# Patient Record
Sex: MALE | Race: WHITE | NOT HISPANIC OR LATINO | Employment: OTHER | URBAN - METROPOLITAN AREA
[De-identification: names, ages, dates, MRNs, and addresses within clinical notes are randomized per-mention and may not be internally consistent; named-entity substitution may affect disease eponyms.]

---

## 2017-01-05 DIAGNOSIS — D68.61 ANTIPHOSPHOLIPID ANTIBODY SYNDROME (H): ICD-10-CM

## 2017-01-05 DIAGNOSIS — I48.91 ATRIAL FIBRILLATION, UNSPECIFIED TYPE (H): ICD-10-CM

## 2017-01-05 DIAGNOSIS — I48.91 ATRIAL FIBRILLATION, UNSPECIFIED TYPE (H): Primary | ICD-10-CM

## 2017-01-05 DIAGNOSIS — D89.811 GRAFT-VERSUS-HOST DISEASE, CHRONIC (H): Primary | ICD-10-CM

## 2017-01-05 DIAGNOSIS — C81.99 HODGKIN'S DISEASE OF EXTRANODAL OR SOLID ORGAN SITE (H): ICD-10-CM

## 2017-01-05 RX ORDER — WARFARIN SODIUM 5 MG/1
TABLET ORAL
Qty: 60 TABLET | Refills: 5 | Status: SHIPPED | OUTPATIENT
Start: 2017-01-05 | End: 2017-01-05

## 2017-01-06 RX ORDER — WARFARIN SODIUM 5 MG/1
TABLET ORAL
Qty: 60 TABLET | Refills: 5 | Status: SHIPPED | OUTPATIENT
Start: 2017-01-06 | End: 2018-02-13

## 2017-01-10 ENCOUNTER — ANTICOAGULATION THERAPY VISIT (OUTPATIENT)
Dept: NURSING | Facility: CLINIC | Age: 67
End: 2017-01-10
Payer: COMMERCIAL

## 2017-01-10 DIAGNOSIS — Z79.01 LONG-TERM (CURRENT) USE OF ANTICOAGULANTS: ICD-10-CM

## 2017-01-10 DIAGNOSIS — I48.91 ATRIAL FIBRILLATION, UNSPECIFIED TYPE (H): Primary | ICD-10-CM

## 2017-01-10 LAB — INR POINT OF CARE: 2.5 (ref 0.86–1.14)

## 2017-01-10 PROCEDURE — 36416 COLLJ CAPILLARY BLOOD SPEC: CPT

## 2017-01-10 PROCEDURE — 99207 ZZC NO CHARGE NURSE ONLY: CPT

## 2017-01-10 PROCEDURE — 85610 PROTHROMBIN TIME: CPT | Mod: QW

## 2017-01-10 NOTE — PROGRESS NOTES
ANTICOAGULATION FOLLOW-UP CLINIC VISIT    Patient Name:  Haresh Rock  Date:  1/10/2017  Contact Type:  Face to Face    SUBJECTIVE:     Patient Findings     Positives No Problem Findings           OBJECTIVE    INR PROTIME   Date Value Ref Range Status   01/10/2017 2.5* 0.86 - 1.14 Final     FACTOR 2 ASSAY   Date Value Ref Range Status   05/01/2007 58* 60 - 140 % Final     Comment:     (Note)  CALLED TO [facility](INTERV. RADIOLOGY)[id number],        ASSESSMENT / PLAN  INR assessment THER    Recheck INR In: 6 WEEKS    INR Location Clinic      Anticoagulation Summary as of 1/10/2017     INR goal 2.0-3.0   Selected INR 2.5 (1/10/2017)   Maintenance plan 5 mg (5 mg x 1) on Tue, Thu; 2.5 mg (5 mg x 0.5) all other days   Full instructions 5 mg on Tue, Thu; 2.5 mg all other days   Weekly total 22.5 mg   No change documented Huy Muse RN   Plan last modified Belinda Francis RN (4/19/2016)   Next INR check 2/21/2017   Priority INR   Target end date Indefinite    Indications   Long-term (current) use of anticoagulants [Z79.01] [Z79.01]  Atrial fibrillation (H) [I48.91]         Anticoagulation Episode Summary     INR check location     Preferred lab     Send INR reminders to [facility]    Comments       Anticoagulation Care Providers     Provider Role Specialty Phone number    Anya Nowak MD [facility] [phone]            See the Encounter Report to view Anticoagulation Flowsheet and Dosing Calendar (Go to Encounters tab in chart review, and find the Anticoagulation Therapy Visit)    RN reviewed patient's weekly warfarin dose.  Pt INR remains within therapeutic range.  Pt will continue with current dosing and monitoring as planned, based on physician directed care plan.    [facility]     Huy Muse, K[place]AS

## 2017-01-10 NOTE — MR AVS SNAPSHOT
Haresh Rock   1/10/2017 10:30 AM   Anticoagulation Therapy Visit    Description:  66 year old male   Provider:  TY ANTI COAG   Department:  [facility] Nurse           INR as of 1/10/2017     Selected INR 2.5 (1/10/2017)      Anticoagulation Summary as of 1/10/2017     INR goal 2.0-3.0   Selected INR 2.5 (1/10/2017)   Full instructions 5 mg on Tue, Thu; 2.5 mg all other days   Next INR check 2/21/2017    Indications   Long-term (current) use of anticoagulants [Z79.01] [Z79.01]  Atrial fibrillation (H) [I48.91]         Your next Anticoagulation Clinic appointment(s)     Feb 21, 2017 10:30 AM   Anticoagulation Visit with TY ANTI LEONIDES   [facility] ([facility]    [address] [address] [phone]              Contact Numbers     [facility]  Please call [phone] to cancel and/or reschedule your appointment   Please call [phone] with any problems or questions regarding your therapy.        January 2017 Details    Sun Mon Tue Wed Thu Fri Sat     1               2               3               4               5               6               7                 8               9               10      5 mg   See details      11      2.5 mg         12      5 mg         13      2.5 mg         14      2.5 mg           15      2.5 mg         16      2.5 mg         17      5 mg         18      2.5 mg         19      5 mg         20      2.5 mg         21      2.5 mg           22      2.5 mg         23      2.5 mg         24      5 mg         25      2.5 mg         26      5 mg         27      2.5 mg         28      2.5 mg           29      2.5 mg         30      2.5 mg         31      5 mg              Date Details   01/10 This INR check               How to take your warfarin dose     To take:  2.5 mg Take 0.5 of a 5 mg tablet.    To take:  5 mg Take 1 of the 5 mg tablets.           February 2017 Details    Sun Mon Tue Wed Thu Fri Sat        1      2.5 mg         2       5 mg         3      2.5 mg         4      2.5 mg           5      2.5 mg         6      2.5 mg         7      5 mg         8      2.5 mg         9      5 mg         10      2.5 mg         11      2.5 mg           12      2.5 mg         13      2.5 mg         14      5 mg         15      2.5 mg         16      5 mg         17      2.5 mg         18      2.5 mg           19      2.5 mg         20      2.5 mg         21            22               23               24               25                 26               27               28                    Date Details   No additional details    Date of next INR:  2/21/2017         How to take your warfarin dose     To take:  2.5 mg Take 0.5 of a 5 mg tablet.    To take:  5 mg Take 1 of the 5 mg tablets.

## 2017-02-16 ENCOUNTER — TELEPHONE (OUTPATIENT)
Dept: ONCOLOGY | Facility: CLINIC | Age: 67
End: 2017-02-16

## 2017-02-16 NOTE — TELEPHONE ENCOUNTER
[facility] called to report that patient was dispensed the incorrect strength of pantoprazole on 11/11/2016.  He was given 20 mg tablets.  The order was 40 mg once daily.  The pharmacist informed writer that the pt was given 20 mg tablets with the directions to take one tablet daily.  Pt therefore was taking 1/2 of the prescribed dose.  The pharmacist reported to writer that the patient is requesting a new prescription with the 20 mg dose, as he says it is working for him.      Message send to Dr. Miller letting him know of the error and for approval to order the 20 mg dose for the patient.

## 2017-02-17 DIAGNOSIS — C81.99 HODGKIN'S DISEASE OF EXTRANODAL OR SOLID ORGAN SITE (H): ICD-10-CM

## 2017-02-17 DIAGNOSIS — D89.811 GRAFT-VERSUS-HOST DISEASE, CHRONIC (H): ICD-10-CM

## 2017-02-17 RX ORDER — PANTOPRAZOLE SODIUM 20 MG/1
20 TABLET, DELAYED RELEASE ORAL DAILY
Qty: 90 TABLET | Refills: 3 | Status: SHIPPED | OUTPATIENT
Start: 2017-02-17 | End: 2018-02-16

## 2017-02-21 ENCOUNTER — ANTICOAGULATION THERAPY VISIT (OUTPATIENT)
Dept: NURSING | Facility: CLINIC | Age: 67
End: 2017-02-21
Payer: COMMERCIAL

## 2017-02-21 DIAGNOSIS — Z79.01 LONG-TERM (CURRENT) USE OF ANTICOAGULANTS: ICD-10-CM

## 2017-02-21 DIAGNOSIS — I48.91 ATRIAL FIBRILLATION, UNSPECIFIED TYPE (H): ICD-10-CM

## 2017-02-21 LAB — INR POINT OF CARE: 2.2 (ref 0.86–1.14)

## 2017-02-21 PROCEDURE — 36416 COLLJ CAPILLARY BLOOD SPEC: CPT

## 2017-02-21 PROCEDURE — 99207 ZZC NO CHARGE NURSE ONLY: CPT

## 2017-02-21 PROCEDURE — 85610 PROTHROMBIN TIME: CPT | Mod: QW

## 2017-02-21 NOTE — MR AVS SNAPSHOT
Haresh Rock   2/21/2017 10:30 AM   Anticoagulation Therapy Visit    Description:  66 year old male   Provider:  TY ANTI COAG   Department:  [facility] Nurse           INR as of 2/21/2017     Today's INR 2.2      Anticoagulation Summary as of 2/21/2017     INR goal 2.0-3.0   Today's INR 2.2   Full instructions 5 mg on Tue, Thu; 2.5 mg all other days   Next INR check 4/11/2017    Indications   Long-term (current) use of anticoagulants [Z79.01] [Z79.01]  Atrial fibrillation (H) [I48.91]         Your next Anticoagulation Clinic appointment(s)     Apr 11, 2017 10:30 AM CDT   Anticoagulation Visit with TY ANTI COAAYUSH   [facility] ([facility]    [address] [address] [phone]              Contact Numbers     [facility]  Please call [phone] to cancel and/or reschedule your appointment   Please call [phone] with any problems or questions regarding your therapy.        February 2017 Details    Sun Mon Tue Wed Thu Fri Sat        1               2               3               4                 5               6               7               8               9               10               11                 12               13               14               15               16               17               18                 19               20               21      5 mg   See details      22      2.5 mg         23      5 mg         24      2.5 mg         25      2.5 mg           26      2.5 mg         27      2.5 mg         28      5 mg              Date Details   02/21 This INR check               How to take your warfarin dose     To take:  2.5 mg Take 0.5 of a 5 mg tablet.    To take:  5 mg Take 1 of the 5 mg tablets.           March 2017 Details    Sun Mon Tue Wed Thu Fri Sat        1      2.5 mg         2      5 mg         3      2.5 mg         4      2.5 mg           5      2.5 mg         6      2.5 mg         7      5 mg         8      2.5 mg          9      5 mg         10      2.5 mg         11      2.5 mg           12      2.5 mg         13      2.5 mg         14      5 mg         15      2.5 mg         16      5 mg         17      2.5 mg         18      2.5 mg           19      2.5 mg         20      2.5 mg         21      5 mg         22      2.5 mg         23      5 mg         24      2.5 mg         25      2.5 mg           26      2.5 mg         27      2.5 mg         28      5 mg         29      2.5 mg         30      5 mg         31      2.5 mg           Date Details   No additional details            How to take your warfarin dose     To take:  2.5 mg Take 0.5 of a 5 mg tablet.    To take:  5 mg Take 1 of the 5 mg tablets.           April 2017 Details    Sun Mon Tue Wed Thu Fri Sat           1      2.5 mg           2      2.5 mg         3      2.5 mg         4      5 mg         5      2.5 mg         6      5 mg         7      2.5 mg         8      2.5 mg           9      2.5 mg         10      2.5 mg         11            12               13               14               15                 16               17               18               19               20               21               22                 23               24               25               26               27               28               29                 30                      Date Details   No additional details    Date of next INR:  4/11/2017         How to take your warfarin dose     To take:  2.5 mg Take 0.5 of a 5 mg tablet.    To take:  5 mg Take 1 of the 5 mg tablets.

## 2017-02-21 NOTE — PROGRESS NOTES
ANTICOAGULATION FOLLOW-UP CLINIC VISIT    Patient Name:  Haresh Rock  Date:  2/21/2017  Contact Type:  Face to Face    SUBJECTIVE:     Patient Findings     Positives No Problem Findings           OBJECTIVE    INR Protime   Date Value Ref Range Status   02/21/2017 2.2 (A) 0.86 - 1.14 Final     Factor 2 Assay   Date Value Ref Range Status   05/01/2007 58 (L) 60 - 140 % Final     Comment:     (Note)  CALLED TO [facility](INTERV. RADIOLOGY)[id number],        ASSESSMENT / PLAN  INR assessment THER    Recheck INR In: 7 WEEKS    INR Location Clinic      Anticoagulation Summary as of 2/21/2017     INR goal 2.0-3.0   Today's INR 2.2   Maintenance plan 5 mg (5 mg x 1) on Tue, Thu; 2.5 mg (5 mg x 0.5) all other days   Full instructions 5 mg on Tue, Thu; 2.5 mg all other days   Weekly total 22.5 mg   Plan last modified Belinda Francis RN (4/19/2016)   Next INR check 4/11/2017   Priority INR   Target end date Indefinite    Indications   Long-term (current) use of anticoagulants [Z79.01] [Z79.01]  Atrial fibrillation (H) [I48.91]         Anticoagulation Episode Summary     INR check location     Preferred lab     Send INR reminders to [facility]    Comments       Anticoagulation Care Providers     Provider Role Specialty Phone number    Anya Nowak MD [facility] [phone]            See the Encounter Report to view Anticoagulation Flowsheet and Dosing Calendar (Go to Encounters tab in chart review, and find the Anticoagulation Therapy Visit)        Milvia Sierra, RN

## 2017-03-19 ASSESSMENT — ENCOUNTER SYMPTOMS
SMELL DISTURBANCE: 0
EYE PAIN: 0
NECK MASS: 0
SINUS PAIN: 0
HOARSE VOICE: 0
SINUS CONGESTION: 0
SORE THROAT: 0
TASTE DISTURBANCE: 0
DOUBLE VISION: 0
TROUBLE SWALLOWING: 0
EYE WATERING: 0
EYE REDNESS: 1
EYE IRRITATION: 1

## 2017-03-24 ENCOUNTER — OFFICE VISIT (OUTPATIENT)
Dept: ENDOCRINOLOGY | Facility: CLINIC | Age: 67
End: 2017-03-24

## 2017-03-24 VITALS
SYSTOLIC BLOOD PRESSURE: 123 MMHG | HEIGHT: 72 IN | HEART RATE: 73 BPM | BODY MASS INDEX: 28.21 KG/M2 | DIASTOLIC BLOOD PRESSURE: 71 MMHG | WEIGHT: 208.3 LBS

## 2017-03-24 DIAGNOSIS — R59.0 CERVICAL ADENOPATHY: ICD-10-CM

## 2017-03-24 DIAGNOSIS — E04.1 THYROID NODULE: Primary | ICD-10-CM

## 2017-03-24 DIAGNOSIS — Z92.3 HISTORY OF IRRADIATION, PRESENTING HAZARDS TO HEALTH: ICD-10-CM

## 2017-03-24 DIAGNOSIS — E11.9 TYPE 2 DIABETES MELLITUS (H): ICD-10-CM

## 2017-03-24 DIAGNOSIS — Z85.71 HISTORY OF HODGKIN'S LYMPHOMA: ICD-10-CM

## 2017-03-24 LAB — HBA1C MFR BLD: 6.5 % (ref 4.3–6)

## 2017-03-24 ASSESSMENT — PAIN SCALES - GENERAL: PAINLEVEL: NO PAIN (0)

## 2017-03-24 NOTE — PROGRESS NOTES
Endocrinology Consult Note    Attending ASSESSMENT/PLAN:     Goiter, asymmetrical by imaging .  the goiter asymmetry appears stable on CT dating back to 2005.  The thyroid , especially the right lobe, looked smaller today on real time US.    Because of the history of radiation exposures he should continue to have yearly TFTS for life, or sooner prn    subcm thyroid nodules by US - as per # 1. FNAB of 2 nodules last year benign.  No significant change seen on real time US today.      Cervical adenopathy  - cluster of abnormal right level 3 LNs seen on real time US today. I will send him for formal US to follow up and formally document this.  I believe that what I see on US today might be seen on the 5/16 CT as well.  He has history of hodgkin lymphoma.       Addendum: I am going to recommend US guided FNAB of  Right level 3 LN # 5.  Send for cytology and flow.     Addendum 2: cytology suggests LN is benign.  Excisional biopsy would be required for further definitive diagnosis if suspicion is high .    History of  TBI for BMT (2007) is probably lower risk factor for thyroid cancer due to his age at exposure.  However, he also recalls the [employer] store fluoroscope as a child, unclear how much /if he had true exposure there.  This could have been an important risk factor for thyroid malignancy.    Anticoagulated.     Diabetes mellitus type 2 - Last A1c 6.7% 11/16.      Fabi Jimenez MD      Cc/  HISTORY OF PRESENT ILLNESS  Haresh returns for follow up of asymmetrical goiter with subcm  thyroid nodules    Thyroid nodule was noted on the 11/16/15 chest Ct obtained in the routine course of follow up for Hodgkin lymphoma. However, CT dating to 2005 shows the goiter asymmetry as seen in 11/15.  On 4/25/16 he underwent FNAB of right thyroid nodule # 3 (right posterior 1 x 0.8 x 0.8 cm)  and # 4 (0.9 x 0.8 x 0.8, hyperechoic) , both with benign cytology .     He has a history of myeloproliferative syndrome for which he got  allogenic peripheral blood non -myeloablative stem cell transplant in 2007.  Hodgkin lymphoma was diagnosed in 2011, treated with chemotherapy but not XRT.  Because of the above 2 conditions, he has had extensive imaging in the system.      he recalled the shoe store fluoroscope -- unclear age of exposure or how much exposure he might have had.  He received TBI as part of the BMT in 2007.        I have reviewed images on [facility]  12/11/12 PET CT - no abnormal thyroid uptake   11/16/15 CT neck: right lobe is slightly larger than left - suggestion of inferior/posterior nodule which is 1.7 x 1.1 x . This has tissue plane suggesting it could be extrathyroidal. This is also seen on the 5/6/14 and 5/20/11 neck CT- where it appears stable over time. I can also see it on the 7/8/05 chest CT- stable  3/23/16 thyroid US: somewhat hypoechoic thick  gland most noted right inferior without clear nodule to correspond to CT finding. The most concerning defined nodule is:   Right # 3  Posterior vague borders  1.0- x  0.8 X 0.8 . This is within the larger ill defined area of inferior/ posterior thyroid vagueness  5/16/16 neck CT: thyroid appears fairly symmetrical, right slightly larger; right posterior hypodense subcm  Review of images obtained after the appt :  3/29/17 thyroid and neck US:   Right level 3 abnormal cluster of LNs is confirmed by radiologist -     DM 2 - last A1c 6.7% from 11/29/16   he had seen Jesusita Mejia in the past, last saw her 2012.  DM relevant labs:   9/19/16: urine albumin 23  11/29/16: creatinine 1.06, TSH 2.89  Doesn't check BS    REVIEW OF SYSTEMS  Weight stable  Had cataract surgery in December  No neck symtoms  No dysphagia  Cardiac: negative  GI: negative  No pain  Neuropathy in feet      Past Medical History  Past Medical History:   Diagnosis Date     Antiphospholipid antibody syndrome (H) 2005    Ravi's     Atrial fibrillation (H) 4/2011     Cirrhosis (H)      CKD (chronic kidney disease) stage 1, GFR  90 ml/min or greater      CKD (chronic kidney disease) stage 2, GFR 60-89 ml/min      Diabetes mellitus type II     steroid induced     DJD (degenerative joint disease) of knee     SHAWN, worse on right     DVT (deep venous thrombosis) (H)      ED (erectile dysfunction)      Gallbladder polyp 5/2010     Wnbes-Caumco-Hgtc Disease 2007    BMT     Hemochromatosis      Hodgkin's disease NOS     5 cycles of ABVD in 2011     HTN (hypertension)      Hyperlipidaemia LDL goal <100      Myeloproliferative disorder (H)     atypical, [facility]     PE (pulmonary embolism) 11/2004     Polyneuropathy (H)      Primary pulmonary hypertension (H)      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    Past Surgical History:   Procedure Laterality Date     ANESTHESIA CARDIOVERSION  4/21/2011    Procedure:ANESTHESIA CARDIOVERSION; Surgeon:GENERIC ANESTHESIA PROVIDER; Location:[facility] OR     ARTHROSCOPY KNEE RT/LT      LT     COLONOSCOPY  10/16/2012    Procedure: COLONOSCOPY;  Colonoscopy, screening;  Surgeon: Reg Feliciano MD;  Location: [facility] OR     H ABLATION FOCAL AFIB  3/5/2013    PVI     HC BONE MARROW/STEM TRANSPLANT ALLOGENIC  5/8/2007     INSERT PORT VASCULAR ACCESS       JOINT REPLACEMTN, KNEE RT/LT  3/28/12    SHAWN     VASECTOMY  1990       Medications  Current Outpatient Prescriptions   Medication Sig Dispense Refill     pantoprazole (PROTONIX) 20 MG EC tablet Take 1 tablet (20 mg) by mouth daily 90 tablet 3     warfarin (COUMADIN) 5 MG tablet 5 mg on Tue, Thu; 2.5 mg all other days or as directed by coumadin clinic 60 tablet 5     simvastatin (ZOCOR) 10 MG tablet TAKE 1 TABLET (10 MG) BY MOUTH AT BEDTIME 90 tablet 3     fluticasone (FLONASE) 50 MCG/ACT nasal spray SPRAY 1-2 SPRAYS INTO BOTH NOSTRILS DAILY NEEDS TO BE SEEN FOR FURTHER REFILLS 16 mL 11     sotalol (BETAPACE) 80 MG tablet Take 0.5 tablets (40 mg) by mouth 2 times daily Follow up visit needed with Dr Matt 90 tablet 3     folic acid (FOLVITE) 1 MG  tablet Take 1 tablet (1,000 mcg) by mouth daily 90 tablet 3     Pseudoeph-Doxylamine-DM-APAP (NYQUIL PO) Take by mouth as needed       amoxicillin (AMOXIL) 875 MG tablet Take 1 tablet (875 mg) by mouth 2 times daily (Patient taking differently: Take 875 mg by mouth 2 times daily Prior to dental appts) 20 tablet 0     ARTIFICIAL TEAR SOLUTION OP Apply  to eye. USE AS DIRECTED(DF)       CENTRUM OR 1 TABLET DAILY       Allergies  Allergies   Allergen Reactions     No Known Drug Allergy      Family History  family history includes Alzheimer Disease in his father; CEREBROVASCULAR DISEASE in his mother; DIABETES in his paternal aunt; GASTROINTESTINAL DISEASE in his maternal grandmother; Hypertension in his mother; Thyroid Disease in his sister. There is no history of CANCER, C.A.D., or Thyroid Cancer.    Social History  Social History   Substance Use Topics     Smoking status: Never Smoker     Smokeless tobacco: Never Used     Alcohol use No      ; lives in [place]    Physical Exam  /71  Pulse 73  Ht 1.829 m (6')  Wt 94.5 kg (208 lb 4.8 oz)  BMI 28.25 kg/m2  Body mass index is 28.25 kg/(m^2).  GENERAL : middle aged man  In no apparent distress  SKIN: Normal color, normal temperature, texture.       EYES: PER, No scleral icterus,  No proptosis, conjunctival redness, stare, retraction  NECK: No visible masses. No palpable adenopathy, or masses   THYROID:  Not palpable . I have performed real time thyriod and neck US.  On the right lateral neck, approx level 3, there is a subtle cluster of lymph nodes.  The thyroid lobe is small. The left lobe appears larger than right today   RESP: Lungs clear to auscultation bilaterally  CARDIAC: Regular rate and rhythm, normal S1 S2, with 2/6 systolic murmur across precordium; no  rubs or gallops   NEURO: awake, alert, responds appropriately to questions.  Moves all extremities; No tremor of the outstretched hand.  DTRs  0/4 ,   EXTREMITIES: No clubbing, cyanosis or  edema.    DATA REVIEW    Copath Report 04/25/2016  2:54 PM 88      Patient Name: PAMELA GRANADOS   MR#: [id number]   Specimen #: [id number]   Collected: 4/25/2016   Received: 4/25/2016   Reported: 4/26/2016 17:16   Ordering Phy(s): ANGEL GUPTA     SPECIMEN/STAIN PROCESS:   A: FNA-thyroid, right nodule #3        Pap-Cyto x 4, Diff Quick Stain-cyto x 5   B: FNA-thyroid, right nodule #4        Pap-Cyto x 3, Diff Quick Stain-cyto x 3     ----------------------------------------------------------------     CYTOLOGIC INTERPRETATION:     A. Thyroid, right nodule #3, ultrasound guided fine needle aspiration:   - Benign   - Consistent with a benign nodule (includes adenomatoid nodule, colloid   nodule, etc.)   Specimen Adequacy: Satisfactory for evaluation.     The [place] Implied Risk of Malignancy and Recommended Clinical   Management:   Benign has a 0-3% risk of malignancy, recommended management is clinical   follow-up     B. Thyroid, right nodule #4, ultrasound guided fine needle aspiration:   - Benign   - Consistent with a benign nodule (includes adenomatoid nodule, colloid   nodule, etc.)   Specimen Adequacy: Satisfactory for evaluation.     The [place] Implied Risk of Malignancy and Recommended Clinical   Management:   Benign has a 0-3% risk of malignancy, recommended management is clinical   follow-up     I have personally reviewed all specimens and/or slides, including the   listed special stains, and used them with my medical judgment to   determine the final diagnosis.     Electronically signed out by:   Agustin Calvin M.D., [facility]     Processed and screened at [facility],   [facility]     CLINICAL HISTORY:   65 year old male with a history of MDS, s/p non-myeloablative   allogeneic-sibling peripheral blood stem cell transplant, chronic   zdvll-ggyyco-kfls disease, liver cirrhosis, hemochromatosis, and Hodgkin   lymphoma.     ,     GROSS:   YURI  FNA-thyroid, right nodule #3:  Received are 4 fixed slides, processed   for Pap stain, and 5 air dried slides, processed for Diff Quik stain.   Thyroid panel tube held.     B. FNA-thyroid, right nodule #4:  Received are 3 fixed slides, processed   for Pap stain, and 3 air dried slides, processed for Diff Quik stain.   Thyroid panel tube held.     INTRAOPERATIVE CONSULTATION:   FNA Performance: Fine needle aspiration was not performed by [facility],    Pathology staff.     Immediate Adequacy: On site specimen adequacy evaluation was performed   by Dr. CODY Acuña MD via telepathology.     Onsite adequacy/interpretation:   Pass A1: inadequate; Pass A2: put directly into thyroid panel tube;   Passes A3-A6: inadequate   Pass B1: adequate; Pass B2: put directly into thyroid panel tube; Passes   B3-B4: adequate     MICROSCOPIC:   Microscopic examination is performed.   Nikunj Velez MD, Yuridia HAMPTONBS-PGY2     CPT Codes:   A: 88677-JYFN-DZH, 36297-BUGC-WNN, 46135-DNSN   B: 21990-HNXL-UPW, 17317-PVXA-EHR, 05132-EPJM     TESTING LAB LOCATION:   [address]   [address]   [phone]     COLLECTION SITE:   Client:  [facility]   Location:  [facility] ()        EXAM: TEMPORARY 3/23/2016 10:28 AM      COMPARISON: Correlation made with CT of the neck from 11/16/2015     HISTORY: Thyroid nodule on CT.     FINDINGS:     Technique: Grayscale and color ultrasound imaging of the thyroid was  performed.     Findings:   Thyroid parenchyma: Homogenous thyroid with nodules     The right lobe of the thyroid measures: 2.4 x 2.0 x 3.8 cm     The thyroid isthmus measures: 6 mm thick     The left lobe of the thyroid measures: 2.0 x 1.5 x 4.3 cm      Right lobe:  Nodule 1: Superior  Largest diameter: 0.3 cm  Shape: Round  Composition: Predominantly cystic  Echogenicity: Mostly anechoic  Margins: Smooth  Internal  Echogenic Foci: None     Nodule 2: Mid to superior  Largest diameter: 0.5 cm  Shape: Wider-than-tall  Composition: Predominantly cystic  Echogenicity: Mostly anechoic  Margins: Smooth  Internal Echogenic Foci: None     Nodule 3: Inferior/exophytic  Largest diameter: 1.0 cm  Shape: Round  Composition: Solid  Echogenicity: Heterogeneously hypoechoic  Margins: Ill-defined  Internal Echogenic Foci: None     Nodule 4: Inferior medial  Largest diameter: 0.9 cm  Shape: Round  Composition: Predominantly solid  Echogenicity: Heterogeneously isoechoic  Margins: Ill-defined  Internal Echogenic Foci: None         IMPRESSION:   Right thyroid nodules as described above, 2 of which are solid and  measure approximately 1 cm in maximum dimension.      I have personally reviewed the examination and initial interpretation  and I agree with the findings.     MADELYN HARRISON MD         CT SOFT TISSUE NECK W CONTRAST 5/16/2016 11:07 AM     History: Hereditary hemochromatosis, Type 2 diabetes mellitus with  diabetic nephropathy, Presence of artificial knee joint, bilateral,  Hodgkin lymphoma, unspecified, extranodal and solid organ sites, Bone  marrow transplant status, Paroxysmal atrial fibrillation      Comparison: Images from ultrasound-guided thyroid FNA 4/25/2016. Neck  CT 11/16/2015.      Technique: Following intravenous administration of nonionic iodinated  contrast medium, thin section helical CT images were obtained from the  skull base down to the level of the aortic arch. Axial, coronal and  sagittal reformations were performed with 2-3 mm slice thickness  reconstruction. Images were reviewed in soft tissue, lung and bone  windows.     Contrast: Isovue 370 127cc     Findings:   Evaluation of the mucosal space demonstrates no evident abnormality in  the nasopharynx, oropharynx, hypopharynx or the glottis. The tongue  base appears normal. The major salivary glands appear unremarkable.     There is no evident cervical  lymphadenopathy. Few prominent but  nonenlarged subcentimeter left subclavicular lymph nodes again  demonstrated. The fascial spaces in the neck are intact bilaterally.  The major vascular structures in the neck appear unremarkable.  Hypoattenuating subcentimeter right thyroid nodules, unchanged.     Evaluation of the osseous structures demonstrates multilevel  degenerative changes of the cervical spine, most pronounced at C4-5,  C5-6, and C6-7. Moderate associated spinal canal narrowing at C5-6 and  C6-7. Multilevel neuroforaminal stenosis. New moderate bilateral  maxillary sinus mucosal thickening. Patent bilateral maxillary  antrostomies. Also new moderate bilateral ethmoid mucosal thickening  and increased left frontal sinus mucosal thickening. Mild sphenoid  sinus mucosal thickening. Opacification of a few scattered bilateral  mastoid air cells.      Mosaic attenuation of the visualized lung apices which may be related  to the degree of inspiration. Mild scarring in the right lung apex  again demonstrated.         Impression:  1. No evidence of recurrent lymphoma in the neck.  2. New moderate paranasal sinus mucosal thickening.     TIN DUNCAN MD      EXAMINATION: Thyroid ultrasound, 3/29/2017 1:11 PM      COMPARISON: 5/16/2016 soft tissue neck CT; 3/23/2016 thyroid  ultrasound; 4/25/2016 ultrasound-guided thyroid nodule fine-needle  aspiration     HISTORY: Nontoxic single thyroid nodule     Technique: Grayscale and color ultrasound imaging of the thyroid was  performed.     Findings:   Thyroid parenchyma: Homogeneous     The right lobe of the thyroid measures: 2.3 x 1.9 x 4.0 cm      The thyroid isthmus measures: 0.5 cm      The left lobe of the thyroid measures: 1.7 x 2.0 x 3.5 cm      0.5 x 0.2 x 0.3 hypoechoic, nonvascular nodule in the superior right  thyroid lobe, unchanged since 3/23/2016.     0.4 x 0.2 x 0.4 cm hypoechoic, nonvascular nodule in the mid to  superior right thyroid lobe, unchanged  since 3/23/2016.     0.9 x 0.8 x 0.7 cm hypoechoic, solid, nonvascular nodule in the  posterior inferior right thyroid lobe, unchanged since 3/23/2016. This  was previously biopsied on 4/25/2016.     0.7 x 0.6 x 0.7 cm hypoechoic, solid, nonvascular nodule in the medial  mid to inferior right thyroid lobe, unchanged since 3/23/2016. This  was previously biopsied on 4/25/2016.     No left thyroid lobe nodules.         Impression:  No significant change in right thyroid nodules since 3/23/2016, the 2  largest of which were biopsied on 4/25/2016 with benign pathologic  result.     I have personally reviewed the examination and initial interpretation  and I agree with the findings.     HO WORRELL MD    EXAMINATION: US HEAD NECK SOFT TISSUE, 3/29/2017 1:09 PM      COMPARISON: 5/16/2016 soft tissue neck CT     HISTORY: abnormal cluster of LNs right level 3 seen on real time US  today., Nontoxic single thyroid nodule, Localized enlarged lymph nodes     FINDINGS:      Lymph nodes are measured bilaterally with measurements given in  craniocaudal, transverse and AP dimensions as follows:     Right:  Level 1: No lymph nodes.  Level 2: No lymph nodes.  Level 3: Multiple ovoid, hypoechoic lymph nodes at this level, most of  which contain fatty alex, though at least one demonstrates eccentric  cortical thickening. This lymph node measures 0.5 x 0.6 x 0.9 cm and  is labeled #5. The lymph node labeled #1 measuring 0.5 x 0.9 x 0.8 cm  does not demonstrate a normal fatty hilum and may also demonstrate  mild cortical thickening.  Level 4: No lymph nodes.  Level 5: No lymph nodes.  Level 6: No lymph nodes.  Level 7: No lymph nodes.     Left:  Level 1: No lymph nodes.  Level 2: No lymph nodes.  Level 3: No lymph nodes.  Level 4: No lymph nodes.  Level 5: No lymph nodes.  Level 6: No lymph nodes.  Level 7: No lymph nodes.         IMPRESSION:  Cluster of small lymph nodes at level 3 on the right, some of which  demonstrate  indeterminate characteristics but appear to correlate to  clustered lymph nodes seen on 5/16/2016 soft tissue neck CT without  substantial change, accounting for differences in modality.     I have personally reviewed the examination and initial interpretation  and I agree with the findings.     LEONARDO REYES      Patient Name: PAMELA GRANADOS   MR#: [id number]   Specimen #: [id number]   Collected: 4/17/2017   Received: 4/17/2017   Reported: 4/18/2017 16:14   Ordering Phy(s): ANGEL GUPTA     For improved result formatting, select 'View Enhanced Report Format'   under Linked Documents section.     SPECIMEN/STAIN PROCESS:   FNA-lymph node- Right Level 3 #5        Diff Quick Stain-cyto x 2     ----------------------------------------------------------------     CYTOLOGIC INTERPRETATION:      Lymph node, right level 3 #5, ultrasound-guided fine needle aspiration:     Polymorphous lymphoid population (see comment).   Specimen Adequacy: Satisfactory for evaluation.     COMMENT:   Concurrent flow cytometry specimen [id number] reports presence of   polytypic B-cells. Although no cytologic features are noted, if   Hodgkin's lymphoma is strongly suspected, an excisional biopsy with   histologic examination and immunohistochemistry is recommended.     I have personally reviewed all specimens and/or slides, including the   listed special stains, and used them with my medical judgment to   determine the final diagnosis.     Electronically signed out by:   Nikunj Velez M.D., [facility]     Processed and screened at [facility]     CLINICAL HISTORY:   The patient is a 66-year-old man with history of myeloproliferative   syndrome status post allogenic peripheral blood non-myeloablative stem   cell transplant ( 2007) and Hodgkin's lymphoma (2011) treated with   chemotherapy.  Recent neck CT identified abnormal cluster of right level   4 lymph nodes, the largest  measuring 0.9 cm.  Procedure:   Ultrasound-guided fine needle aspiration.     ,     GROSS:   FNA-lymph node- Right Level 3 #5:  Received are 2 air dried slides,   processed for Diff Quik stain. Material in RPMI sent to Flow Cytometry.     INTRAOPERATIVE CONSULTATION:   FNA Performance: Fine needle aspiration was not performed by [facility],    Pathology staff.     Immediate Adequacy: On site specimen adequacy evaluation was performed   by Dr. ULI Velez III, MD via telepathology.     On-site adequacy/interpretation:   Pass A1: Adequate, Pass A2: Put directly into RPMI, Pass A3: Adequate     MICROSCOPIC:   Microscopic examination is performed.     Koki Merchant MD, Ph D          Nikunj Velez III, MD     CPT Codes:    22097-QBSU-NFO, 06115-DOJI-RVJ   A: 91207-YJMY     TESTING LAB LOCATION:   [facility], [address]   [address]   [phone]     COLLECTION SITE:   Client:  [facility]   Location:  [facility] ([facility])

## 2017-03-24 NOTE — PATIENT INSTRUCTIONS
In general, you should Continue to have yearly thyroid function tests for life, or sooner if a concern arises.     To expedite your medication refill(s), please contact your pharmacy and have them fax a refill request to: [phone].  *Please allow 3 business days for routine medication refills.  *Please allow 5 business days for controlled substance medication refills.  --------------------  For scheduling appointments (including lab work), please request an appointment through [facility], or call: [phone].    For questions for your provider or the endocrine nurse, please send a [employer] message.  For after-hours urgent issues, please dial [phone], and ask to speak with the Endocrinologist On-Call.  --------------------  Please Note: If you are active on [employer], all future test results will be sent by [employer] message only and will no longer be sent by mail. You may also receive communication directly from your physician.

## 2017-03-24 NOTE — MR AVS SNAPSHOT
After Visit Summary   3/24/2017    Haresh Rock    MRN: [id number]           Patient Information     Date Of Birth          1950        Visit Information        Provider Department      3/24/2017 1:00 PM Fabi Jimenez MD [facility]        Today's Diagnoses     Thyroid nodule    -  1    Cervical adenopathy          Care Instructions    In general, you should Continue to have yearly thyroid function tests for life, or sooner if a concern arises.     To expedite your medication refill(s), please contact your pharmacy and have them fax a refill request to: [phone].  *Please allow 3 business days for routine medication refills.  *Please allow 5 business days for controlled substance medication refills.  --------------------  For scheduling appointments (including lab work), please request an appointment through [employer], or call: [phone].    For questions for your provider or the endocrine nurse, please send a [employer] message.  For after-hours urgent issues, please dial [phone], and ask to speak with the Endocrinologist On-Call.  --------------------  Please Note: If you are active on [employer], all future test results will be sent by [employer] message only and will no longer be sent by mail. You may also receive communication directly from your physician.          Follow-ups after your visit        Your next 10 appointments already scheduled     Mar 29, 2017 12:15 PM CDT   US HEAD NECK SOFT TISSUE with UCUS1   [facility] ([facility])    [address] [place] [address] [phone]           Please bring a list of your medicines (including vitamins, minerals and over-the-counter drugs). Also, tell your doctor about any allergies you may have. Wear comfortable clothes and leave your valuables at home.  You do not need to do anything special to prepare for your exam.  Please call the Imaging Department at  your exam site with any questions.            Mar 29, 2017  1:00 PM CDT   US THYROID with UCUS1   [facility] ([facility])    [address]  [address] [facility] [id number] [phone]           Please bring a list of your medicines (including vitamins, minerals and over-the-counter drugs). Also, tell your doctor about any allergies you may have. Wear comfortable clothes and leave your valuables at home.  You do not need to do anything special to prepare for your exam.  Please call the Imaging Department at your exam site with any questions.            Apr 11, 2017 10:30 AM CDT   Anticoagulation Visit with FZ ANTI COAG   [facility]    [address] [id number] [phone]            May 30, 2017 10:30 AM CDT   [facility] Draw with  [facility] LAB DRAW   [facility] Draw ([facility])    [address] [id number] [phone]            May 30, 2017 11:00 AM CDT   Return with Augusto Miller MD   [facility] Mar[facility] ([facility])    [address]  [address] [facility] [id number] [phone]              Future tests that were ordered for you today     Open Future Orders        Priority Expected Expires Ordered    US Thyroid Routine  10/20/2017 3/24/2017    US head neck soft tissue Routine 6/22/2017 10/20/2017 3/24/2017            Who to contact     Please call your clinic at [phone] to:    Ask questions about your health    Make or cancel appointments    Discuss your medicines    Learn about your test results    Speak to your doctor   If you have compliments or concerns about an experience at your clinic, or if you wish to file a complaint, please contact [facility] Patient Relations at [phone] or email us at  [email or web address]         Additional Information About Your Visit        [facility] Information     [employer] gives you secure access to your electronic health record. If you see a primary care provider, you can also send messages to your care team and make appointments. If you have questions, please call your primary care clinic.  If you do not have a primary care provider, please call [phone] and they will assist you.      [employer] is an electronic gateway that provides easy, online access to your medical records. With [employer], you can request a clinic appointment, read your test results, renew a prescription or communicate with your care team.     To access your existing account, please contact your [facility] or call [phone] for assistance.        Care EveryWhere ID     This is your Care EveryWhere ID. This could be used by other organizations to access your [facility] medical records  [id number]        Your Vitals Were     Pulse Height BMI (Body Mass Index)             73 1.829 m (6') 28.25 kg/m2          Blood Pressure from Last 3 Encounters:   03/24/17 123/71   12/27/16 124/78   11/29/16 148/87    Weight from Last 3 Encounters:   03/24/17 94.5 kg (208 lb 4.8 oz)   12/27/16 93.9 kg (207 lb)   11/29/16 94.3 kg (207 lb 12.8 oz)                 Today's Medication Changes          These changes are accurate as of: 3/24/17  1:28 PM.  If you have any questions, ask your nurse or doctor.               These medicines have changed or have updated prescriptions.        Dose/Directions    amoxicillin 875 MG tablet   Commonly known as:  AMOXIL   This may have changed:  additional instructions   Used for:  Acute sinusitis with symptoms > 10 days        Dose:  875 mg   Take 1 tablet (875 mg) by mouth 2 times daily   Quantity:  20 tablet   Refills:  0                Primary Care Provider Office Phone # Fax #    Anya Nowak [phone]       [place]  [facility] [address] [address]        Thank you!     Thank you for choosing [facility]  for your care. Our goal is always to provide you with excellent care. Hearing back from our patients is one way we can continue to improve our services. Please take a few minutes to complete the written survey that you may receive in the mail after your visit with us. Thank you!             Your Updated Medication List - Protect others around you: Learn how to safely use, store and throw away your medicines at www.disposemymeds.org.          This list is accurate as of: 3/24/17  1:28 PM.  Always use your most recent med list.                   Brand Name Dispense Instructions for use    amoxicillin 875 MG tablet    AMOXIL    20 tablet    Take 1 tablet (875 mg) by mouth 2 times daily       ARTIFICIAL TEAR SOLUTION OP      Apply  to eye. USE AS DIRECTED(DF)       CENTRUM PO      1 TABLET DAILY       fluticasone 50 MCG/ACT spray    FLONASE    16 mL    SPRAY 1-2 SPRAYS INTO BOTH NOSTRILS DAILY NEEDS TO BE SEEN FOR FURTHER REFILLS       folic acid 1 MG tablet    FOLVITE    90 tablet    Take 1 tablet (1,000 mcg) by mouth daily       NYQUIL PO      Take by mouth as needed       pantoprazole 20 MG EC tablet    PROTONIX    90 tablet    Take 1 tablet (20 mg) by mouth daily       simvastatin 10 MG tablet    ZOCOR    90 tablet    TAKE 1 TABLET (10 MG) BY MOUTH AT BEDTIME       sotalol 80 MG tablet    BETAPACE    90 tablet    Take 0.5 tablets (40 mg) by mouth 2 times daily Follow up visit needed with Dr Matt       warfarin 5 MG tablet    COUMADIN    60 tablet    5 mg on Tue, Thu; 2.5 mg all other days or as directed by coumadin clinic

## 2017-03-24 NOTE — LETTER
3/24/2017     RE: Haresh Rock  [address] [address]     Dear Colleague,    Thank you for referring your patient, Haresh Rock, to the [facility] ENDOCRINOLOGY at [facility]. Please see a copy of my visit note below.    Endocrinology Consult Note    Attending ASSESSMENT/PLAN:     Goiter, asymmetrical by imaging .  the goiter asymmetry appears stable on CT dating back to 2005.  The thyroid , especially the right lobe, looked smaller today on real time US.    Because of the history of radiation exposures he should continue to have yearly TFTS for life, or sooner prn    subcm thyroid nodules by US - as per # 1. FNAB of 2 nodules last year benign.  No significant change seen on real time US today.      Cervical adenopathy  - cluster of abnormal right level 3 LNs seen on real time US today. I will send him for formal US to follow up and formally document this.  I believe that what I see on US today might be seen on the 5/16 CT as well.  He has history of hodgkin lymphoma.       History of  TBI for BMT (2007) is probably lower risk factor for thyroid cancer due to his age at exposure.  However, he also recalls the [employer] store fluoroscope as a child, unclear how much /if he had true exposure there.  This could have been an important risk factor for thyroid malignancy.    Anticoagulated.     Diabetes mellitus type 2 - Last A1c 6.7% 11/16.      Fabi Jimenez MD      Cc/  HISTORY OF PRESENT ILLNESS  Haresh returns for follow up of asymmetrical goiter with subcm  thyroid nodules    Thyroid nodule was noted on the 11/16/15 chest Ct obtained in the routine course of follow up for Hodgkin lymphoma. However, CT dating to 2005 shows the goiter asymmetry as seen in 11/15.  On 4/25/16 he underwent FNAB of right thyroid nodule # 3 (right posterior 1 x 0.8 x 0.8 cm)  and # 4 (0.9 x 0.8 x 0.8, hyperechoic) , both with benign cytology .     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  Hodgkin lymphoma was diagnosed in 2011, treated with chemotherapy but not XRT.  Because of the above 2 conditions, he has had extensive imaging in the system.      he recalled the shoe store fluoroscope -- unclear age of exposure or how much exposure he might have had.  He received TBI as part of the BMT in 2007.        I have reviewed images on [facility]  12/11/12 PET CT - no abnormal thyroid uptake   11/16/15 CT neck: right lobe is slightly larger than left - suggestion of inferior/posterior nodule which is 1.7 x 1.1 x . This has tissue plane suggesting it could be extrathyroidal. This is also seen on the 5/6/14 and 5/20/11 neck CT- where it appears stable over time. I can also see it on the 7/8/05 chest CT- stable  3/23/16 thyroid US: somewhat hypoechoic thick  gland most noted right inferior without clear nodule to correspond to CT finding. The most concerning defined nodule is:   Right # 3  Posterior vague borders  1.0- x  0.8 X 0.8 . This is within the larger ill defined area of inferior/ posterior thyroid vagueness  5/16/16 neck CT: thyroid appears fairly symmetrical, right slightly larger; right posterior hypodense subcm    DM 2 - last A1c 6.7% from 11/29/16   he had seen Jesusita Mejia in the past, last saw her 2012.  DM relevant labs:   9/19/16: urine albumin 23  11/29/16: creatinine 1.06, TSH 2.89  Doesn't check BS    REVIEW OF SYSTEMS  Weight stable  Had cataract surgery in December  No neck symtoms  No dysphagia  Cardiac: negative  GI: negative  No pain  Neuropathy in feet      Past Medical History  Past Medical History:   Diagnosis Date     Antiphospholipid antibody syndrome (H) 2005    Ravi's     Atrial fibrillation (H) 4/2011     Cirrhosis (H)      CKD (chronic kidney disease) stage 1, GFR 90 ml/min or greater      CKD (chronic kidney disease) stage 2, GFR 60-89 ml/min      Diabetes  mellitus type II     steroid induced     DJD (degenerative joint disease) of knee     SHAWN, worse on right     DVT (deep venous thrombosis) (H)      ED (erectile dysfunction)      Gallbladder polyp 5/2010     Wrqtg-Jbgxdv-Pgbm Disease 2007    BMT     Hemochromatosis      Hodgkin's disease NOS     5 cycles of ABVD in 2011     HTN (hypertension)      Hyperlipidaemia LDL goal <100      Myeloproliferative disorder (H)     atypical, [facility]     PE (pulmonary embolism) 11/2004     Polyneuropathy (H)      Primary pulmonary hypertension (H)      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    Past Surgical History:   Procedure Laterality Date     ANESTHESIA CARDIOVERSION  4/21/2011    Procedure:ANESTHESIA CARDIOVERSION; Surgeon:GENERIC ANESTHESIA PROVIDER; Location:[facility] OR     ARTHROSCOPY KNEE RT/LT      LT     COLONOSCOPY  10/16/2012    Procedure: COLONOSCOPY;  Colonoscopy, screening;  Surgeon: Reg Feliciano MD;  Location: [facility] OR     H ABLATION FOCAL AFIB  3/5/2013    PVI     HC BONE MARROW/STEM TRANSPLANT ALLOGENIC  5/8/2007     INSERT PORT VASCULAR ACCESS       JOINT REPLACEMTN, KNEE RT/LT  3/28/12    SHAWN     VASECTOMY  1990       Medications  Current Outpatient Prescriptions   Medication Sig Dispense Refill     pantoprazole (PROTONIX) 20 MG EC tablet Take 1 tablet (20 mg) by mouth daily 90 tablet 3     warfarin (COUMADIN) 5 MG tablet 5 mg on Tue, Thu; 2.5 mg all other days or as directed by coumadin clinic 60 tablet 5     simvastatin (ZOCOR) 10 MG tablet TAKE 1 TABLET (10 MG) BY MOUTH AT BEDTIME 90 tablet 3     fluticasone (FLONASE) 50 MCG/ACT nasal spray SPRAY 1-2 SPRAYS INTO BOTH NOSTRILS DAILY NEEDS TO BE SEEN FOR FURTHER REFILLS 16 mL 11     sotalol (BETAPACE) 80 MG tablet Take 0.5 tablets (40 mg) by mouth 2 times daily Follow up visit needed with Dr Matt 90 tablet 3     folic acid (FOLVITE) 1 MG tablet Take 1 tablet (1,000 mcg) by mouth daily 90 tablet 3     Pseudoeph-Doxylamine-DM-APAP (NYQUIL  PO) Take by mouth as needed       amoxicillin (AMOXIL) 875 MG tablet Take 1 tablet (875 mg) by mouth 2 times daily (Patient taking differently: Take 875 mg by mouth 2 times daily Prior to dental appts) 20 tablet 0     ARTIFICIAL TEAR SOLUTION OP Apply  to eye. USE AS DIRECTED(DF)       CENTRUM OR 1 TABLET DAILY       Allergies  Allergies   Allergen Reactions     No Known Drug Allergy      Family History  family history includes Alzheimer Disease in his father; CEREBROVASCULAR DISEASE in his mother; DIABETES in his paternal aunt; GASTROINTESTINAL DISEASE in his maternal grandmother; Hypertension in his mother; Thyroid Disease in his sister. There is no history of CANCER, C.A.D., or Thyroid Cancer.    Social History  Social History   Substance Use Topics     Smoking status: Never Smoker     Smokeless tobacco: Never Used     Alcohol use No      ; lives in [place]    Physical Exam  /71  Pulse 73  Ht 1.829 m (6')  Wt 94.5 kg (208 lb 4.8 oz)  BMI 28.25 kg/m2  Body mass index is 28.25 kg/(m^2).  GENERAL : middle aged man  In no apparent distress  SKIN: Normal color, normal temperature, texture.       EYES: PER, No scleral icterus,  No proptosis, conjunctival redness, stare, retraction  NECK: No visible masses. No palpable adenopathy, or masses   THYROID:  Not palpable . I have performed real time thyriod and neck US.  On the right lateral neck, approx level 3, there is a subtle cluster of lymph nodes.  The thyroid lobe is small. The left lobe appears larger than right today   RESP: Lungs clear to auscultation bilaterally  CARDIAC: Regular rate and rhythm, normal S1 S2, with 2/6 systolic murmur across precordium; no  rubs or gallops   NEURO: awake, alert, responds appropriately to questions.  Moves all extremities; No tremor of the outstretched hand.  DTRs  0/4 ,   EXTREMITIES: No clubbing, cyanosis or edema.    DATA REVIEW    Copath Report 04/25/2016  2:54 PM 88      Patient Name: PAMELA GRANADOS   MR#:  [id number]   Specimen #: [id number]   Collected: 4/25/2016   Received: 4/25/2016   Reported: 4/26/2016 17:16   Ordering Phy(s): ANGEL GUPTA     SPECIMEN/STAIN PROCESS:   A: FNA-thyroid, right nodule #3        Pap-Cyto x 4, Diff Quick Stain-cyto x 5   B: FNA-thyroid, right nodule #4        Pap-Cyto x 3, Diff Quick Stain-cyto x 3     ----------------------------------------------------------------     CYTOLOGIC INTERPRETATION:     A. Thyroid, right nodule #3, ultrasound guided fine needle aspiration:   - Benign   - Consistent with a benign nodule (includes adenomatoid nodule, colloid   nodule, etc.)   Specimen Adequacy: Satisfactory for evaluation.     The [place] Implied Risk of Malignancy and Recommended Clinical   Management:   Benign has a 0-3% risk of malignancy, recommended management is clinical   follow-up     B. Thyroid, right nodule #4, ultrasound guided fine needle aspiration:   - Benign   - Consistent with a benign nodule (includes adenomatoid nodule, colloid   nodule, etc.)   Specimen Adequacy: Satisfactory for evaluation.     The [place] Implied Risk of Malignancy and Recommended Clinical   Management:   Benign has a 0-3% risk of malignancy, recommended management is clinical   follow-up     I have personally reviewed all specimens and/or slides, including the   listed special stains, and used them with my medical judgment to   determine the final diagnosis.     Electronically signed out by:   Agustin Calvin M.D., Physicians     Processed and screened at 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     ,     GROSS:   A. FNA-thyroid, right nodule #3:  Received are 4 fixed slides, processed   for Pap stain, and 5 air dried slides,  processed for Diff Quik stain.   Thyroid panel tube held.     B. FNA-thyroid, right nodule #4:  Received are 3 fixed slides, processed   for Pap stain, and 3 air dried slides, processed for Diff Quik stain.   Thyroid panel tube held.     INTRAOPERATIVE CONSULTATION:   FNA Performance: Fine needle aspiration was not performed by [facility],    Pathology staff.     Immediate Adequacy: On site specimen adequacy evaluation was performed   by Dr. CODY Acuña MD via telepathology.     Onsite adequacy/interpretation:   Pass A1: inadequate; Pass A2: put directly into thyroid panel tube;   Passes A3-A6: inadequate   Pass B1: adequate; Pass B2: put directly into thyroid panel tube; Passes   B3-B4: adequate     MICROSCOPIC:   Microscopic examination is performed.   Nikunj Velez MD, Yuridia HAMPTONBS-PGY2     CPT Codes:   A: 18543-SRFP-URK, 62125-BZJV-XFI, 49697-ADND   B: 16370-FGQG-XLW, 61660-JDST-UFD, 23101-VLAD     TESTING LAB LOCATION:   [facility], [address]   [address]   [phone]     COLLECTION SITE:   Client:  [facility]   Location:  [facility] (B)        EXAM: TEMPORARY 3/23/2016 10:28 AM      COMPARISON: Correlation made with CT of the neck from 11/16/2015     HISTORY: Thyroid nodule on CT.     FINDINGS:     Technique: Grayscale and color ultrasound imaging of the thyroid was  performed.     Findings:   Thyroid parenchyma: Homogenous thyroid with nodules     The right lobe of the thyroid measures: 2.4 x 2.0 x 3.8 cm     The thyroid isthmus measures: 6 mm thick     The left lobe of the thyroid measures: 2.0 x 1.5 x 4.3 cm      Right lobe:  Nodule 1: Superior  Largest diameter: 0.3 cm  Shape: Round  Composition: Predominantly cystic  Echogenicity: Mostly anechoic  Margins: Smooth  Internal Echogenic Foci: None     Nodule 2: Mid to superior  Largest diameter: 0.5 cm  Shape: Wider-than-tall  Composition:  Predominantly cystic  Echogenicity: Mostly anechoic  Margins: Smooth  Internal Echogenic Foci: None     Nodule 3: Inferior/exophytic  Largest diameter: 1.0 cm  Shape: Round  Composition: Solid  Echogenicity: Heterogeneously hypoechoic  Margins: Ill-defined  Internal Echogenic Foci: None     Nodule 4: Inferior medial  Largest diameter: 0.9 cm  Shape: Round  Composition: Predominantly solid  Echogenicity: Heterogeneously isoechoic  Margins: Ill-defined  Internal Echogenic Foci: None         IMPRESSION:   Right thyroid nodules as described above, 2 of which are solid and  measure approximately 1 cm in maximum dimension.      I have personally reviewed the examination and initial interpretation  and I agree with the findings.     MADELYN HARRISON MD         CT SOFT TISSUE NECK W CONTRAST 5/16/2016 11:07 AM     History: Hereditary hemochromatosis, Type 2 diabetes mellitus with  diabetic nephropathy, Presence of artificial knee joint, bilateral,  Hodgkin lymphoma, unspecified, extranodal and solid organ sites, Bone  marrow transplant status, Paroxysmal atrial fibrillation      Comparison: Images from ultrasound-guided thyroid FNA 4/25/2016. Neck  CT 11/16/2015.      Technique: Following intravenous administration of nonionic iodinated  contrast medium, thin section helical CT images were obtained from the  skull base down to the level of the aortic arch. Axial, coronal and  sagittal reformations were performed with 2-3 mm slice thickness  reconstruction. Images were reviewed in soft tissue, lung and bone  windows.     Contrast: Isovue 370 127cc     Findings:   Evaluation of the mucosal space demonstrates no evident abnormality in  the nasopharynx, oropharynx, hypopharynx or the glottis. The tongue  base appears normal. The major salivary glands appear unremarkable.     There is no evident cervical lymphadenopathy. Few prominent but  nonenlarged subcentimeter left subclavicular lymph nodes again  demonstrated. The fascial  spaces in the neck are intact bilaterally.  The major vascular structures in the neck appear unremarkable.  Hypoattenuating subcentimeter right thyroid nodules, unchanged.     Evaluation of the osseous structures demonstrates multilevel  degenerative changes of the cervical spine, most pronounced at C4-5,  C5-6, and C6-7. Moderate associated spinal canal narrowing at C5-6 and  C6-7. Multilevel neuroforaminal stenosis. New moderate bilateral  maxillary sinus mucosal thickening. Patent bilateral maxillary  antrostomies. Also new moderate bilateral ethmoid mucosal thickening  and increased left frontal sinus mucosal thickening. Mild sphenoid  sinus mucosal thickening. Opacification of a few scattered bilateral  mastoid air cells.      Mosaic attenuation of the visualized lung apices which may be related  to the degree of inspiration. Mild scarring in the right lung apex  again demonstrated.     Impression:  1. No evidence of recurrent lymphoma in the neck.  2. New moderate paranasal sinus mucosal thickening.     TIN DUNCAN MD    Again, thank you for allowing me to participate in the care of your patient.      Sincerely,    Fabi Jimenez MD

## 2017-03-30 DIAGNOSIS — C85.90 LYMPHOMA (H): ICD-10-CM

## 2017-03-30 DIAGNOSIS — D89.811 GRAFT-VERSUS-HOST DISEASE, CHRONIC (H): ICD-10-CM

## 2017-03-30 RX ORDER — FOLIC ACID 1 MG/1
1000 TABLET ORAL DAILY
Qty: 90 TABLET | Refills: 3 | Status: SHIPPED | OUTPATIENT
Start: 2017-03-30 | End: 2018-04-09

## 2017-04-11 ENCOUNTER — ANTICOAGULATION THERAPY VISIT (OUTPATIENT)
Dept: NURSING | Facility: CLINIC | Age: 67
End: 2017-04-11
Payer: COMMERCIAL

## 2017-04-11 ENCOUNTER — TELEPHONE (OUTPATIENT)
Dept: NURSING | Facility: CLINIC | Age: 67
End: 2017-04-11

## 2017-04-11 DIAGNOSIS — I48.91 ATRIAL FIBRILLATION, UNSPECIFIED TYPE (H): Primary | ICD-10-CM

## 2017-04-11 DIAGNOSIS — Z79.01 LONG-TERM (CURRENT) USE OF ANTICOAGULANTS: ICD-10-CM

## 2017-04-11 DIAGNOSIS — I48.91 ATRIAL FIBRILLATION, UNSPECIFIED TYPE (H): ICD-10-CM

## 2017-04-11 DIAGNOSIS — Z86.718 PERSONAL HISTORY OF DVT (DEEP VEIN THROMBOSIS): ICD-10-CM

## 2017-04-11 LAB — INR POINT OF CARE: 2 (ref 0.86–1.14)

## 2017-04-11 PROCEDURE — 99207 ZZC NO CHARGE NURSE ONLY: CPT

## 2017-04-11 PROCEDURE — 85610 PROTHROMBIN TIME: CPT | Mod: QW

## 2017-04-11 PROCEDURE — 36416 COLLJ CAPILLARY BLOOD SPEC: CPT

## 2017-04-11 NOTE — MR AVS SNAPSHOT
Haresh Rock   4/11/2017 10:30 AM   Anticoagulation Therapy Visit    Description:  66 year old male   Provider:  TY ANTI COAG   Department:  [facility]           INR as of 4/11/2017     Today's INR 2.0      Anticoagulation Summary as of 4/11/2017     INR goal 2.0-3.0   Today's INR 2.0   Full instructions 5 mg on Tue, Thu; 2.5 mg all other days   Next INR check 5/23/2017    Indications   Long-term (current) use of anticoagulants [Z79.01] [Z79.01]  Atrial fibrillation (H) [I48.91]         Your next Anticoagulation Clinic appointment(s)     May 23, 2017 10:30 AM CDT   Anticoagulation Visit with TY ANTI COAG   [facility] ([address] [address] [phone]              Contact Numbers     [facility]  Please call [phone] to cancel and/or reschedule your appointment   Please call [phone] with any problems or questions regarding your therapy.        April 2017 Details    Sun Mon Tue Wed Thu Fri Sat           1                 2               3               4               5               6               7               8                 9               10               11      5 mg   See details      12      2.5 mg         13      5 mg         14      2.5 mg         15      2.5 mg           16      2.5 mg         17      2.5 mg         18      5 mg         19      2.5 mg         20      5 mg         21      2.5 mg         22      2.5 mg           23      2.5 mg         24      2.5 mg         25      5 mg         26      2.5 mg         27      5 mg         28      2.5 mg         29      2.5 mg           30      2.5 mg                Date Details   04/11 This INR check               How to take your warfarin dose     To take:  2.5 mg Take 0.5 of a 5 mg tablet.    To take:  5 mg Take 1 of the 5 mg tablets.           May 2017 Details    Sun Mon Tue Wed Thu Fri Sat      1      2.5 mg         2      5 mg         3      2.5 mg         4      5  mg         5      2.5 mg         6      2.5 mg           7      2.5 mg         8      2.5 mg         9      5 mg         10      2.5 mg         11      5 mg         12      2.5 mg         13      2.5 mg           14      2.5 mg         15      2.5 mg         16      5 mg         17      2.5 mg         18      5 mg         19      2.5 mg         20      2.5 mg           21      2.5 mg         22      2.5 mg         23            24               25               26               27                 28               29               30               31                   Date Details   No additional details    Date of next INR:  5/23/2017         How to take your warfarin dose     To take:  2.5 mg Take 0.5 of a 5 mg tablet.    To take:  5 mg Take 1 of the 5 mg tablets.

## 2017-04-11 NOTE — PROGRESS NOTES
ANTICOAGULATION FOLLOW-UP CLINIC VISIT    Patient Name:  Haresh Rock  Date:  4/11/2017  Contact Type:  Face to Face    SUBJECTIVE:     Patient Findings     Positives Missed doses (Missed one dose last week, Tue or Wed), No Problem Findings           OBJECTIVE    INR Protime   Date Value Ref Range Status   04/11/2017 2.0 (A) 0.86 - 1.14 Final     Factor 2 Assay   Date Value Ref Range Status   05/01/2007 58 (L) 60 - 140 % Final     Comment:     (Note)  CALLED TO [facility](INTERV. RADIOLOGY)[id number],        ASSESSMENT / PLAN  No question data found.  Anticoagulation Summary as of 4/11/2017     INR goal 2.0-3.0   Today's INR 2.0   Maintenance plan 5 mg (5 mg x 1) on Tue, Thu; 2.5 mg (5 mg x 0.5) all other days   Full instructions 5 mg on Tue, Thu; 2.5 mg all other days   Weekly total 22.5 mg   No change documented Laverne Ferguson RN   Plan last modified Belinda Francis RN (4/19/2016)   Next INR check 5/23/2017   Priority INR   Target end date Indefinite    Indications   Long-term (current) use of anticoagulants [Z79.01] [Z79.01]  Atrial fibrillation (H) [I48.91]         Anticoagulation Episode Summary     INR check location     Preferred lab     Send INR reminders to [facility]    Comments       Anticoagulation Care Providers     Provider Role Specialty Phone number    Anya Nowak MD [facility] [phone]            See the Encounter Report to view Anticoagulation Flowsheet and Dosing Calendar (Go to Encounters tab in chart review, and find the Anticoagulation Therapy Visit)    RN reviewed patient's weekly warfarin dose. Pt INR remains within therapeutic range. Pt will continue with current dosing and monitoring as planned, based on physician directed care plan.'      Laverne Ferguson, RN

## 2017-04-20 ENCOUNTER — TELEPHONE (OUTPATIENT)
Dept: ENDOCRINOLOGY | Facility: CLINIC | Age: 67
End: 2017-04-20

## 2017-04-20 NOTE — TELEPHONE ENCOUNTER
----- Message from Augusto Miller MD sent at 4/20/2017  9:43 AM CDT -----  Regarding: RE: lymph nodes  Thanks Fabi, I will review these and sort out if we need to do excisional biopsy  ashia  ----- Message -----     From: Fabi Jimenez MD     Sent: 4/19/2017  10:38 PM       To: Augusto Miller MD  Subject: lymph nodes                                      In the course of evaluating his thyroid I saw a cluster of  abnormal right lateral neck lymph nodes.  We have performed FNAB and flow cytometry on one of them, with benign cytology and flow.   WE can also  see these nodes on the CT from 5/16.    The pathologist  made the point it would require excisions biopsy if we are seriously concerned for Hodgkin's.  I wouldn't pursue this but I wanted to let you know in case you felt this is important to do.      Fabi Jimenez

## 2017-05-23 ENCOUNTER — ANTICOAGULATION THERAPY VISIT (OUTPATIENT)
Dept: NURSING | Facility: CLINIC | Age: 67
End: 2017-05-23
Payer: COMMERCIAL

## 2017-05-23 DIAGNOSIS — Z79.01 LONG-TERM (CURRENT) USE OF ANTICOAGULANTS: ICD-10-CM

## 2017-05-23 DIAGNOSIS — I48.91 ATRIAL FIBRILLATION, UNSPECIFIED TYPE (H): ICD-10-CM

## 2017-05-23 LAB — INR POINT OF CARE: 2.2 (ref 0.86–1.14)

## 2017-05-23 PROCEDURE — 36416 COLLJ CAPILLARY BLOOD SPEC: CPT

## 2017-05-23 PROCEDURE — 99207 ZZC NO CHARGE NURSE ONLY: CPT

## 2017-05-23 PROCEDURE — 85610 PROTHROMBIN TIME: CPT | Mod: QW

## 2017-05-23 NOTE — MR AVS SNAPSHOT
Haresh Rock   5/23/2017 10:30 AM   Anticoagulation Therapy Visit    Description:  67 year old male   Provider:  TY ANTI COAG   Department:  [facility]           INR as of 5/23/2017     Today's INR 2.2      Anticoagulation Summary as of 5/23/2017     INR goal 2.0-3.0   Today's INR 2.2   Full instructions 5 mg on Tue, Thu; 2.5 mg all other days   Next INR check 7/6/2017    Indications   Long-term (current) use of anticoagulants [Z79.01] [Z79.01]  Atrial fibrillation (H) [I48.91]         Your next Anticoagulation Clinic appointment(s)     Jul 06, 2017 10:30 AM CDT   Anticoagulation Visit with TY ANTI COAAYUSH   [facility] ([address] [address] [phone]              Contact Numbers     [facility]  Please call [phone] to cancel and/or reschedule your appointment   Please call [phone] with any problems or questions regarding your therapy.        May 2017 Details    Sun Mon Tue Wed Thu Fri Sat      1               2               3               4               5               6                 7               8               9               10               11               12               13                 14               15               16               17               18               19               20                 21               22               23      5 mg   See details      24      2.5 mg         25      5 mg         26      2.5 mg         27      2.5 mg           28      2.5 mg         29      2.5 mg         30      5 mg         31      2.5 mg             Date Details   05/23 This INR check               How to take your warfarin dose     To take:  2.5 mg Take 0.5 of a 5 mg tablet.    To take:  5 mg Take 1 of the 5 mg tablets.           June 2017 Details    Sun Mon Tue Wed Thu Fri Sat         1      5 mg         2      2.5 mg         3      2.5 mg           4      2.5 mg         5      2.5 mg         6      5 mg          7      2.5 mg         8      5 mg         9      2.5 mg         10      2.5 mg           11      2.5 mg         12      2.5 mg         13      5 mg         14      2.5 mg         15      5 mg         16      2.5 mg         17      2.5 mg           18      2.5 mg         19      2.5 mg         20      5 mg         21      2.5 mg         22      5 mg         23      2.5 mg         24      2.5 mg           25      2.5 mg         26      2.5 mg         27      5 mg         28      2.5 mg         29      5 mg         30      2.5 mg           Date Details   No additional details            How to take your warfarin dose     To take:  2.5 mg Take 0.5 of a 5 mg tablet.    To take:  5 mg Take 1 of the 5 mg tablets.           July 2017 Details    Sun Mon Tue Wed Thu Fri Sat           1      2.5 mg           2      2.5 mg         3      2.5 mg         4      5 mg         5      2.5 mg         6            7               8                 9               10               11               12               13               14               15                 16               17               18               19               20               21               22                 23               24               25               26               27               28               29                 30               31                     Date Details   No additional details    Date of next INR:  7/6/2017         How to take your warfarin dose     To take:  2.5 mg Take 0.5 of a 5 mg tablet.    To take:  5 mg Take 1 of the 5 mg tablets.

## 2017-05-30 ENCOUNTER — ONCOLOGY VISIT (OUTPATIENT)
Dept: TRANSPLANT | Facility: CLINIC | Age: 67
End: 2017-05-30
Attending: INTERNAL MEDICINE
Payer: MEDICARE

## 2017-05-30 VITALS
RESPIRATION RATE: 18 BRPM | WEIGHT: 207.7 LBS | SYSTOLIC BLOOD PRESSURE: 120 MMHG | DIASTOLIC BLOOD PRESSURE: 71 MMHG | TEMPERATURE: 97.8 F | OXYGEN SATURATION: 96 % | BODY MASS INDEX: 28.17 KG/M2

## 2017-05-30 DIAGNOSIS — E83.110 HEREDITARY HEMOCHROMATOSIS (H): ICD-10-CM

## 2017-05-30 DIAGNOSIS — E04.1 THYROID NODULE: ICD-10-CM

## 2017-05-30 DIAGNOSIS — Z94.81 STATUS POST BONE MARROW TRANSPLANT (H): ICD-10-CM

## 2017-05-30 DIAGNOSIS — C81.99 HODGKIN'S DISEASE OF EXTRANODAL OR SOLID ORGAN SITE (H): ICD-10-CM

## 2017-05-30 LAB
ALBUMIN SERPL-MCNC: 3.6 G/DL (ref 3.4–5)
ALP SERPL-CCNC: 95 U/L (ref 40–150)
ALT SERPL W P-5'-P-CCNC: 18 U/L (ref 0–70)
ANION GAP SERPL CALCULATED.3IONS-SCNC: 12 MMOL/L (ref 3–14)
AST SERPL W P-5'-P-CCNC: 28 U/L (ref 0–45)
BASOPHILS # BLD AUTO: 0.1 10E9/L (ref 0–0.2)
BASOPHILS NFR BLD AUTO: 0.6 %
BILIRUB SERPL-MCNC: 0.8 MG/DL (ref 0.2–1.3)
BUN SERPL-MCNC: 24 MG/DL (ref 7–30)
CALCIUM SERPL-MCNC: 8.1 MG/DL (ref 8.5–10.1)
CHLORIDE SERPL-SCNC: 109 MMOL/L (ref 94–109)
CO2 SERPL-SCNC: 19 MMOL/L (ref 20–32)
CREAT SERPL-MCNC: 0.99 MG/DL (ref 0.66–1.25)
DIFFERENTIAL METHOD BLD: ABNORMAL
EOSINOPHIL # BLD AUTO: 0.1 10E9/L (ref 0–0.7)
EOSINOPHIL NFR BLD AUTO: 1.6 %
ERYTHROCYTE [DISTWIDTH] IN BLOOD BY AUTOMATED COUNT: 12 % (ref 10–15)
FERRITIN SERPL-MCNC: 82 NG/ML (ref 26–388)
GFR SERPL CREATININE-BSD FRML MDRD: 75 ML/MIN/1.7M2
GLUCOSE SERPL-MCNC: 140 MG/DL (ref 70–99)
HCT VFR BLD AUTO: 45.8 % (ref 40–53)
HGB BLD-MCNC: 16.7 G/DL (ref 13.3–17.7)
IMM GRANULOCYTES # BLD: 0 10E9/L (ref 0–0.4)
IMM GRANULOCYTES NFR BLD: 0.3 %
INR PPP: 2.38 (ref 0.86–1.14)
LYMPHOCYTES # BLD AUTO: 2.6 10E9/L (ref 0.8–5.3)
LYMPHOCYTES NFR BLD AUTO: 32.6 %
MCH RBC QN AUTO: 33.9 PG (ref 26.5–33)
MCHC RBC AUTO-ENTMCNC: 36.5 G/DL (ref 31.5–36.5)
MCV RBC AUTO: 93 FL (ref 78–100)
MONOCYTES # BLD AUTO: 0.7 10E9/L (ref 0–1.3)
MONOCYTES NFR BLD AUTO: 8.2 %
NEUTROPHILS # BLD AUTO: 4.5 10E9/L (ref 1.6–8.3)
NEUTROPHILS NFR BLD AUTO: 56.7 %
NRBC # BLD AUTO: 0 10*3/UL
NRBC BLD AUTO-RTO: 0 /100
PLATELET # BLD AUTO: 117 10E9/L (ref 150–450)
POTASSIUM SERPL-SCNC: 4.7 MMOL/L (ref 3.4–5.3)
PROT SERPL-MCNC: 7.1 G/DL (ref 6.8–8.8)
RBC # BLD AUTO: 4.93 10E12/L (ref 4.4–5.9)
RETICS # AUTO: 111.4 10E9/L (ref 25–95)
RETICS/RBC NFR AUTO: 2.3 % (ref 0.5–2)
SODIUM SERPL-SCNC: 140 MMOL/L (ref 133–144)
TSH SERPL DL<=0.05 MIU/L-ACNC: 2.12 MU/L (ref 0.4–4)
WBC # BLD AUTO: 7.9 10E9/L (ref 4–11)

## 2017-05-30 PROCEDURE — 85045 AUTOMATED RETICULOCYTE COUNT: CPT | Performed by: INTERNAL MEDICINE

## 2017-05-30 PROCEDURE — 80053 COMPREHEN METABOLIC PANEL: CPT | Performed by: INTERNAL MEDICINE

## 2017-05-30 PROCEDURE — 99212 OFFICE O/P EST SF 10 MIN: CPT | Mod: ZF

## 2017-05-30 PROCEDURE — 85025 COMPLETE CBC W/AUTO DIFF WBC: CPT | Performed by: INTERNAL MEDICINE

## 2017-05-30 PROCEDURE — 85610 PROTHROMBIN TIME: CPT | Performed by: INTERNAL MEDICINE

## 2017-05-30 PROCEDURE — 84443 ASSAY THYROID STIM HORMONE: CPT | Performed by: INTERNAL MEDICINE

## 2017-05-30 PROCEDURE — 82728 ASSAY OF FERRITIN: CPT | Performed by: INTERNAL MEDICINE

## 2017-05-30 PROCEDURE — 36415 COLL VENOUS BLD VENIPUNCTURE: CPT

## 2017-05-30 PROCEDURE — 86038 ANTINUCLEAR ANTIBODIES: CPT | Performed by: INTERNAL MEDICINE

## 2017-05-30 RX ORDER — AMOXICILLIN 500 MG/1
500 CAPSULE ORAL PRN
COMMUNITY
Start: 2016-12-05 | End: 2017-11-21

## 2017-05-30 RX ORDER — SODIUM FLUORIDE 6.1 MG/ML
GEL, DENTIFRICE DENTAL DAILY PRN
Refills: 3 | COMMUNITY
Start: 2017-02-14

## 2017-05-30 ASSESSMENT — PAIN SCALES - GENERAL: PAINLEVEL: NO PAIN (0)

## 2017-05-30 NOTE — PROGRESS NOTES
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  Recently, Haresh has been evaluated for thyroid nodules.  It was noted on ultrasound that he had some lymphadenopathy in his neck.  He has had lymphadenopathy in the past.  A needle biopsy was done, which did not show any malignancy, and there was a note that if we are concerned about Hodgkin's disease, we would need to get an excisional biopsy.  Haresh also has no other specific symptoms.  He denies fever, chills, nausea or vomiting.  He denies any night sweats.  He denies any bleeding issues.  He remains on warfarin.      PAST MEDICAL HISTORY:  See my note from 11/2016.      SOCIAL HISTORY:  See my note from 11/2016.      FAMILY HISTORY:  See my note from 11/2016.      REVIEW OF SYSTEMS:  A 12-point review of systems is done and is negative, except as in the HPI.      PHYSICAL EXAMINATION:   GENERAL:  He is an alert gentleman in no acute distress.   VITAL SIGNS:  Stable. /71  Temp 97.8  F (36.6  C) (Oral)  Resp 18  Wt 94.2 kg (207 lb 11.2 oz)  SpO2 96%  BMI 28.17 kg/m2    HEENT:  Normocephalic.  EOM okay, no scleral icterus.  Mouth without lesion.   RESPIRATORY:  Clear to percussion and auscultation.   Neck:  There is some asymmetry to his thyroid.  I feel the right lobe a little more prominently than the left.   LYMPH:  I cannot feel any cervical, supraclavicular, submandibular, axillary or inguinal nodes.   CARDIAC:  Normal sinus rhythm.  No S3, S4 or murmur.   ABDOMEN:  Soft without hepatosplenomegaly.   EXTREMITIES:  Without edema.    Results for  PAMELA GRANADOS (MRN [id number]) as of 5/31/2017 12:56   Ref. Range 5/30/2017 10:39   Sodium Latest Ref Range: 133 - 144 mmol/L 140   Potassium Latest Ref Range: 3.4 - 5.3 mmol/L 4.7   Chloride Latest Ref Range: 94 - 109 mmol/L 109   Carbon Dioxide Latest Ref Range: 20 - 32 mmol/L 19 (L)   Urea Nitrogen Latest Ref Range: 7 - 30 mg/dL 24   Creatinine Latest Ref Range: 0.66 - 1.25 mg/dL 0.99   GFR Estimate Latest Ref Range: >60 mL/min/1.7m2 75   GFR Estimate If Black Latest Ref Range: >60 mL/min/1.7m2 >90...   Calcium Latest Ref Range: 8.5 - 10.1 mg/dL 8.1 (L)   Anion Gap Latest Ref Range: 3 - 14 mmol/L 12   Albumin Latest Ref Range: 3.4 - 5.0 g/dL 3.6   Protein Total Latest Ref Range: 6.8 - 8.8 g/dL 7.1   Bilirubin Total Latest Ref Range: 0.2 - 1.3 mg/dL 0.8   Alkaline Phosphatase Latest Ref Range: 40 - 150 U/L 95   ALT Latest Ref Range: 0 - 70 U/L 18   AST Latest Ref Range: 0 - 45 U/L 28   AIDEE Screen by EIA Latest Ref Range: <1.0  <1.0...   Ferritin Latest Ref Range: 26 - 388 ng/mL 82   TSH Latest Ref Range: 0.40 - 4.00 mU/L 2.12   Glucose Latest Ref Range: 70 - 99 mg/dL 140 (H)   WBC Latest Ref Range: 4.0 - 11.0 10e9/L 7.9   Hemoglobin Latest Ref Range: 13.3 - 17.7 g/dL 16.7   Hematocrit Latest Ref Range: 40.0 - 53.0 % 45.8   Platelet Count Latest Ref Range: 150 - 450 10e9/L 117 (L)   RBC Count Latest Ref Range: 4.4 - 5.9 10e12/L 4.93   MCV Latest Ref Range: 78 - 100 fl 93   MCH Latest Ref Range: 26.5 - 33.0 pg 33.9 (H)   MCHC Latest Ref Range: 31.5 - 36.5 g/dL 36.5   RDW Latest Ref Range: 10.0 - 15.0 % 12.0   Diff Method Unknown Automated Method   % Neutrophils Latest Units: % 56.7   % Lymphocytes Latest Units: % 32.6   % Monocytes Latest Units: % 8.2   % Eosinophils Latest Units: % 1.6   % Basophils Latest Units: % 0.6   % Immature Granulocytes Latest Units: % 0.3   Nucleated RBCs Latest Ref Range: 0 /100 0   Absolute Neutrophil Latest Ref Range: 1.6 - 8.3 10e9/L 4.5   Absolute Lymphocytes Latest Ref Range: 0.8 -  5.3 10e9/L 2.6   Absolute Monocytes Latest Ref Range: 0.0 - 1.3 10e9/L 0.7   Absolute Eosinophils Latest Ref Range: 0.0 - 0.7 10e9/L 0.1   Absolute Basophils Latest Ref Range: 0.0 - 0.2 10e9/L 0.1   Abs Immature Granulocytes Latest Ref Range: 0 - 0.4 10e9/L 0.0   Absolute Nucleated RBC Unknown 0.0   % Retic Latest Ref Range: 0.5 - 2.0 % 2.3 (H)   Absolute Retic Latest Ref Range: 25 - 95 10e9/L 111.4 (H)   INR Latest Ref Range: 0.86 - 1.14  2.38 (H)     ASSESSMENT:     1.  Myeloproliferative syndrome/MDS.   2.  Status post non-myeloablative allo-sib peripheral blood stem cell transplant.   3.  History of chronic sxvoq-irqaqh-wijt disease.   4.  History of Hodgkin's disease.   5.  History of cardiac arrhythmias.   6.  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   7.  History of cirrhosis.   8.  History of pulmonary emboli.   9.  History of elevated hemoglobin A1c.   10.  Thyroid nodule.    11.  Cholelithiasis.     12.  Diverticulosis.   13.  Anticoagulation.   14.  Status post bilateral knee replacement.        I am not sure what to make of the lymphadenopathy seen on the ultrasound.  I assume it is reactive.  I do not believe it is Hodgkin's disease.  He has done extremely well, in terms of his Hodgkin's disease.  I will repeat a CT in 6 months.  I was not going to repeat CTs as he is now 5+ years following chemotherapy for his Hodgkin's, but I will go ahead and check that.  I will do the CT of the neck, as well as chest, abdomen and pelvis in November.  We will check blood work.  We will continue to follow him closely.  All-in-all, he is doing very well.  He seemed to be in normal sinus rhythm today on my cardiac examination.  I will see him again in 6 months' time.

## 2017-05-30 NOTE — NURSING NOTE
Chief Complaint   Patient presents with     Blood Draw     labs only     Vitals and labs done peripherally.  See doc flow sheets for details.  Key Reyes CMA

## 2017-05-30 NOTE — MR AVS SNAPSHOT
After Visit Summary   5/30/2017    Haresh Rock    MRN: [id number]           Patient Information     Date Of Birth          1950        Visit Information        Provider Department      5/30/2017 11:00 AM Augusto Miller MD [facility] Blood and Marrow Transplant        Today's Diagnoses     Hodgkin's disease of extranodal or solid organ site (H)        Status post bone marrow transplant (H)        Hereditary hemochromatosis (H)        Thyroid nodule              [facility]  [address] [place] [id number]  Phone: [phone]  Clinic Hours:   Monday-Thursday: 7am to 7pm   Friday: 7am to 5:30pm   Weekends and holidays:    8am to noon (in general)  If your fever is 100.5  or greater,   call the clinic.  After hours call the   hospital at [phone] or   [phone]. Ask for the BMT   fellow on-call           Care Instructions    11/17 ct & labs  11/21 visit     No print out per pt          Follow-ups after your visit        Follow-up notes from your care team     Return in about 6 months (around 11/30/2017).      Your next 10 appointments already scheduled     Jul 06, 2017 10:30 AM CDT   Anticoagulation [facility] with [facility] AN[facility]AG   [facility]    [address] [facility] [address] [phone]            Nov 17, 2017 10:00 AM CST   (Arrive by 9:45 AM)   CT SOFT TISSUE NECK W CONTRAST with UCCT1   [facility] CT ([facility])    [address] [place] [address] [phone]           Please bring any scans or X-rays taken at other hospitals, if similar tests were done. Also bring a list of your medicines, including vitamins, minerals and over-the-counter drugs. It is safest to leave personal items at home.  Be sure to tell your doctor:   If you have any allergies.   If there s any chance you are pregnant.   If you are breastfeeding.   If you  have any special needs.  You will have contrast for this exam. To prepare:   Do not eat or drink for 2 hours before your exam. If you need to take medicine, you may take it with small sips of water. (We may ask you to take liquid medicine as well.)   The day before your exam, drink extra fluids at least six 8-ounce glasses (unless your doctor tells you to restrict your fluids).  Patients over 70 or patients with diabetes or kidney problems:   If you haven t had a blood test (creatinine test) within the last 30 days, go to your clinic or Diagnostic Imaging Department for this test.  If you have diabetes:   If your kidney function is normal, continue taking your metformin (Avandamet, Glucophage, Glucovance, Metaglip) on the day of your exam.   If your kidney function is abnormal, wait 48 hours before restarting this medicine.  Please wear loose clothing, such as a sweat suit or jogging clothes. Avoid snaps, zippers and other metal. We may ask you to undress and put on a hospital gown.  If you have any questions, please call the Imaging Department where you will have your exam.            Nov 17, 2017 10:30 AM CST   [facility] Lab Draw with  [facility] LAB DRAW   [facility] Lab Draw ([facility])    [address] [place] [id number] [phone]            Nov 21, 2017 10:30 AM CST   Return with Augusto Miller MD   [facility] Blood and Marrow Transplant ([facility])    [address] [place] [address] [phone]              Future tests that were ordered for you today     Open Future Orders        Priority Expected Expires Ordered    Comprehensive metabolic panel Routine 11/30/2017 12/30/2017 5/30/2017    CBC with platelets differential Routine 11/30/2017 12/30/2017 5/30/2017    CT Chest abdomen pelvis w & w/o contrast Routine 11/30/2017 12/30/2017 5/30/2017    CT Soft tissue neck w contrast* Routine  11/30/2017 12/30/2017 5/30/2017    Reticulocyte count Routine 11/30/2017 12/30/2017 5/30/2017    CRP inflammation Routine 11/30/2017 12/30/2017 5/30/2017    Lactate Dehydrogenase Routine 11/30/2017 12/30/2017 5/30/2017    INR Routine 11/30/2017 12/30/2017 5/30/2017    TSH Routine 11/30/2017 12/30/2017 5/30/2017            Who to contact     If you have questions or need follow up information about today's clinic visit or your schedule please contact [facility] BLOOD [facility] MARROW [facility]ANSPLANT directly at [phone].  Normal or non-critical lab and imaging results will be communicated to you by AOMihart, letter or phone within 4 business days after the clinic has received the results. If you do not hear from us within 7 days, please contact the clinic through [email or web address] or phone. If you have a critical or abnormal lab result, we will notify you by phone as soon as possible.  Submit refill requests through [email or web address] or call your pharmacy and they will forward the refill request to us. Please allow 3 business days for your refill to be completed.          Additional Information About Your Visit        AOMihart Information     [email or web address] gives you secure access to your electronic health record. If you see a primary care provider, you can also send messages to your care team and make appointments. If you have questions, please call your primary care clinic.  If you do not have a primary care provider, please call [phone] and they will assist you.        Care EveryWhere ID     This is your Care EveryWhere ID. This could be used by other organizations to access your [facility] medical records  [id number]        Your Vitals Were     Temperature Respirations Pulse Oximetry BMI (Body Mass Index)          97.8  F (36.6  C) (Oral) 18 96% 28.17 kg/m2         Blood Pressure from Last 3 Encounters:   05/30/17 120/71   03/24/17 123/71   12/27/16 124/78    Weight from Last 3 Encounters:   05/30/17 94.2 kg (207 lb 11.2 oz)   03/24/17 94.5 kg  (208 lb 4.8 oz)   12/27/16 93.9 kg (207 lb)              We Performed the Following     Antinuclear antibody screen by EIA     CBC with platelets differential     Comprehensive metabolic panel     Ferritin     INR     Reticulocyte count     TSH          Today's Medication Changes          These changes are accurate as of: 5/30/17 11:59 PM.  If you have any questions, ask your nurse or doctor.               These medicines have changed or have updated prescriptions.        Dose/Directions    * amoxicillin 875 MG tablet   Commonly known as:  AMOXIL   This may have changed:  additional instructions   Used for:  Acute sinusitis with symptoms > 10 days   Changed by:  Jose Manuel Gallegos MD        Dose:  875 mg   Take 1 tablet (875 mg) by mouth 2 times daily   Quantity:  20 tablet   Refills:  0       * amoxicillin 500 MG capsule   Commonly known as:  AMOXIL   This may have changed:  Another medication with the same name was changed. Make sure you understand how and when to take each.   Used for:  Hodgkin's disease of extranodal or solid organ site (H), Status post bone marrow transplant (H), Hereditary hemochromatosis (H), Thyroid nodule   Changed by:  Augsuto Miller MD        Dose:  500 mg   Take 500 mg by mouth as needed   Refills:  0       * Notice:  This list has 2 medication(s) that are the same as other medications prescribed for you. Read the directions carefully, and ask your doctor or other care provider to review them with you.             Recent Review Flowsheet Data     BMT Recent Results Latest Ref Rng & Units 10/18/2016 11/29/2016 1/10/2017 2/21/2017 4/11/2017 5/23/2017 5/30/2017    WBC 4.0 - 11.0 10e9/L - 8.0 - - - - 7.9    Hemoglobin 13.3 - 17.7 g/dL - 17.2 - - - - 16.7    Platelet Count 150 - 450 10e9/L - 123(L) - - - - 117(L)    Neutrophils (Absolute) 1.6 - 8.3 10e9/L - 4.7 - - - - 4.5    INR 0.86 - 1.14 2.5(A) 2.15(H) 2.5(A) 2.2(A) 2.0(A) 2.2(A) 2.38(H)    Sodium 133 - 144 mmol/L - 139 - - -  - 140    Potassium 3.4 - 5.3 mmol/L - 4.1 - - - - 4.7    Chloride 94 - 109 mmol/L - 105 - - - - 109    Glucose 70 - 99 mg/dL - 166(H) - - - - 140(H)    Urea Nitrogen 7 - 30 mg/dL - 26 - - - - 24    Creatinine 0.66 - 1.25 mg/dL - 1.06 - - - - 0.99    Calcium (Total) 8.5 - 10.1 mg/dL - 8.3(L) - - - - 8.1(L)    Protein (Total) 6.8 - 8.8 g/dL - 7.6 - - - - 7.1    Albumin 3.4 - 5.0 g/dL - 3.7 - - - - 3.6    Alkaline Phosphatase 40 - 150 U/L - 95 - - - - 95    AST 0 - 45 U/L - 25 - - - - 28    ALT 0 - 70 U/L - 18 - - - - 18    MCV 78 - 100 fl - 89 - - - - 93               Primary Care Provider Office Phone # Fax #    Anya Nowak [phone]       [address] [address]        Thank you!     Thank you for choosing [facility] BLOOD AND MARROW TRANSPLANT  for your care. Our goal is always to provide you with excellent care. Hearing back from our patients is one way we can continue to improve our services. Please take a few minutes to complete the written survey that you may receive in the mail after your visit with us. Thank you!             Your Updated Medication List - Protect others around you: Learn how to safely use, store and throw away your medicines at www.disposemymeds.org.          This list is accurate as of: 5/30/17 11:59 PM.  Always use your most recent med list.                   Brand Name Dispense Instructions for use    * amoxicillin 875 MG tablet    AMOXIL    20 tablet    Take 1 tablet (875 mg) by mouth 2 times daily       * amoxicillin 500 MG capsule    AMOXIL     Take 500 mg by mouth as needed       ARTIFICIAL TEAR SOLUTION OP      Apply  to eye. USE AS DIRECTED(DF)       CENTRUM PO      1 TABLET DAILY       fluticasone 50 MCG/ACT spray    FLONASE    16 mL    SPRAY 1-2 SPRAYS INTO BOTH NOSTRILS DAILY NEEDS TO BE SEEN FOR FURTHER REFILLS       folic acid 1 MG tablet    FOLVITE    90 tablet    Take 1 tablet (1,000 mcg) by mouth daily       NYQUIL  PO      Take by mouth as needed       pantoprazole 20 MG EC tablet    PROTONIX    90 tablet    Take 1 tablet (20 mg) by mouth daily       PREVIDENT 5000 DRY MOUTH 1.1 % Gel topical gel   Generic drug:  sodium fluoride dental gel      Take by mouth daily       simvastatin 10 MG tablet    ZOCOR    90 tablet    TAKE 1 TABLET (10 MG) BY MOUTH AT BEDTIME       sotalol 80 MG tablet    BETAPACE    90 tablet    Take 0.5 tablets (40 mg) by mouth 2 times daily Follow up visit needed with Dr Matt       warfarin 5 MG tablet    COUMADIN    60 tablet    5 mg on Tue, Thu; 2.5 mg all other days or as directed by coumadin clinic       * Notice:  This list has 2 medication(s) that are the same as other medications prescribed for you. Read the directions carefully, and ask your doctor or other care provider to review them with you.

## 2017-05-30 NOTE — NURSING NOTE
Oncology Rooming Note    May 30, 2017 11:03 AM   Haresh Rock is a 67 year old male who presents for:    Chief Complaint   Patient presents with     RECHECK     Hodgkin's disease of extranodal or solid organ site     Initial Vitals: /71  Temp 97.8  F (36.6  C) (Oral)  Resp 18  Wt 94.2 kg (207 lb 11.2 oz)  SpO2 96%  BMI 28.17 kg/m2 Estimated body mass index is 28.17 kg/(m^2) as calculated from the following:    Height as of 3/24/17: 1.829 m (6').    Weight as of this encounter: 94.2 kg (207 lb 11.2 oz). Body surface area is 2.19 meters squared.  No Pain (0) Comment: Data Unavailable   No LMP for male patient.  Allergies reviewed: Yes  Medications reviewed: Yes    Medications: Medication refills not needed today.  Pharmacy name entered into Wifi Online: [id number] - [place], [place] - [address] [facility]    Clinical concerns: Pt mentions wanting to go to a BMT support group Provider was notified.    7 minutes for nursing intake (face to face time)     Natalya Hutchins MA

## 2017-05-31 LAB — ANA SER QL IA: NORMAL

## 2017-07-18 ENCOUNTER — ANTICOAGULATION THERAPY VISIT (OUTPATIENT)
Dept: NURSING | Facility: CLINIC | Age: 67
End: 2017-07-18
Payer: COMMERCIAL

## 2017-07-18 DIAGNOSIS — I48.91 ATRIAL FIBRILLATION, UNSPECIFIED TYPE (H): ICD-10-CM

## 2017-07-18 DIAGNOSIS — Z79.01 LONG-TERM (CURRENT) USE OF ANTICOAGULANTS: ICD-10-CM

## 2017-07-18 LAB — INR POINT OF CARE: 2.5 (ref 0.86–1.14)

## 2017-07-18 PROCEDURE — 85610 PROTHROMBIN TIME: CPT | Mod: QW

## 2017-07-18 PROCEDURE — 36416 COLLJ CAPILLARY BLOOD SPEC: CPT

## 2017-07-18 PROCEDURE — 99207 ZZC NO CHARGE NURSE ONLY: CPT

## 2017-07-18 NOTE — MR AVS SNAPSHOT
Haresh Rock   7/18/2017 11:15 AM   Anticoagulation Therapy Visit    Description:  67 year old male   Provider:  TY ANTI COAG   Department:  Ty Nurse           INR as of 7/18/2017     Today's INR 2.5      Anticoagulation Summary as of 7/18/2017     INR goal 2.0-3.0   Today's INR 2.5   Full instructions 5 mg on Tue, Thu; 2.5 mg all other days   Next INR check 8/29/2017    Indications   Long-term (current) use of anticoagulants [Z79.01] [Z79.01]  Atrial fibrillation (H) [I48.91]         Your next Anticoagulation Clinic appointment(s)     Jul 18, 2017 11:15 AM CDT   Anticoagulation Visit with  ANTI COAG   [facility] ([address] [address] [phone]            Aug 29, 2017 11:00 AM CDT   Anticoagulation Visit with  ANTI COAG   [facility] ([address] [address] [phone]              Contact Numbers     [facility]  Please call [phone] to cancel and/or reschedule your appointment   Please call [phone] with any problems or questions regarding your therapy.        July 2017 Details    Sun Mon Tue Wed Thu Fri Sat           1                 2               3               4               5               6               7               8                 9               10               11               12               13               14               15                 16               17               18      5 mg   See details      19      2.5 mg         20      5 mg         21      2.5 mg         22      2.5 mg           23      2.5 mg         24      2.5 mg         25      5 mg         26      2.5 mg         27      5 mg         28      2.5 mg         29      2.5 mg           30      2.5 mg         31      2.5 mg               Date Details   07/18 This INR check               How to take your warfarin dose     To take:  2.5 mg Take 0.5 of a 5 mg tablet.     To take:  5 mg Take 1 of the 5 mg tablets.           August 2017 Details    Sun Mon Tue Wed Thu Fri Sat       1      5 mg         2      2.5 mg         3      5 mg         4      2.5 mg         5      2.5 mg           6      2.5 mg         7      2.5 mg         8      5 mg         9      2.5 mg         10      5 mg         11      2.5 mg         12      2.5 mg           13      2.5 mg         14      2.5 mg         15      5 mg         16      2.5 mg         17      5 mg         18      2.5 mg         19      2.5 mg           20      2.5 mg         21      2.5 mg         22      5 mg         23      2.5 mg         24      5 mg         25      2.5 mg         26      2.5 mg           27      2.5 mg         28      2.5 mg         29            30               31                  Date Details   No additional details    Date of next INR:  8/29/2017         How to take your warfarin dose     To take:  2.5 mg Take 0.5 of a 5 mg tablet.    To take:  5 mg Take 1 of the 5 mg tablets.

## 2017-07-18 NOTE — PROGRESS NOTES
ANTICOAGULATION FOLLOW-UP CLINIC VISIT    Patient Name:  Haresh Rock  Date:  7/18/2017  Contact Type:  Face to Face    SUBJECTIVE:     Patient Findings     Positives No Problem Findings           OBJECTIVE    INR Protime   Date Value Ref Range Status   07/18/2017 2.5 (A) 0.86 - 1.14 Final     Factor 2 Assay   Date Value Ref Range Status   05/01/2007 58 (L) 60 - 140 % Final     Comment:     (Note)  CALLED TO TAMARA(INTERV. RADIOLOGY)[id number],        ASSESSMENT / PLAN  No question data found.  Anticoagulation Summary as of 7/18/2017     INR goal 2.0-3.0   Today's INR 2.5   Maintenance plan 5 mg (5 mg x 1) on Tue, Thu; 2.5 mg (5 mg x 0.5) all other days   Full instructions 5 mg on Tue, Thu; 2.5 mg all other days   Weekly total 22.5 mg   No change documented Laverne Ferguson RN   Plan last modified Belinda Francis RN (4/19/2016)   Next INR check 8/29/2017   Priority INR   Target end date Indefinite    Indications   Long-term (current) use of anticoagulants [Z79.01] [Z79.01]  Atrial fibrillation (H) [I48.91]         Anticoagulation Episode Summary     INR check location     Preferred lab     Send INR reminders to [facility]    Comments       Anticoagulation Care Providers     Provider Role Specialty Phone number    Anya Nowak MD Health system Practice [phone]            See the Encounter Report to view Anticoagulation Flowsheet and Dosing Calendar (Go to Encounters tab in chart review, and find the Anticoagulation Therapy Visit)    Dosage adjustment made based on physician directed care plan.    Laverne Ferguson RN

## 2017-08-29 ENCOUNTER — ANTICOAGULATION THERAPY VISIT (OUTPATIENT)
Dept: NURSING | Facility: CLINIC | Age: 67
End: 2017-08-29
Payer: COMMERCIAL

## 2017-08-29 DIAGNOSIS — I48.91 ATRIAL FIBRILLATION, UNSPECIFIED TYPE (H): ICD-10-CM

## 2017-08-29 DIAGNOSIS — Z79.01 LONG-TERM (CURRENT) USE OF ANTICOAGULANTS: ICD-10-CM

## 2017-08-29 LAB — INR POINT OF CARE: 3.3 (ref 0.86–1.14)

## 2017-08-29 PROCEDURE — 85610 PROTHROMBIN TIME: CPT | Mod: QW

## 2017-08-29 PROCEDURE — 99207 ZZC NO CHARGE NURSE ONLY: CPT

## 2017-08-29 PROCEDURE — 36416 COLLJ CAPILLARY BLOOD SPEC: CPT

## 2017-08-29 NOTE — MR AVS SNAPSHOT
Haresh Rock   8/29/2017 11:00 AM   Anticoagulation Therapy Visit    Description:  67 year old male   Provider:  TY ANTI COAG   Department:  Ty Nurse           INR as of 8/29/2017     Today's INR 3.3!      Anticoagulation Summary as of 8/29/2017     INR goal 2.0-3.0   Today's INR 3.3!   Full instructions 5 mg on Tue, Thu; 2.5 mg all other days   Next INR check 10/10/2017    Indications   Long-term (current) use of anticoagulants [Z79.01] [Z79.01]  Atrial fibrillation (H) [I48.91]         Your next Anticoagulation Clinic appointment(s)     Oct 10, 2017 10:45 AM CDT   [facility]ticoagulation [facility] with [facility] ANTI COAG   [facility]    [address] [address] [phone]              Contact Numbers     [facility]  Please call [phone] to cancel and/or reschedule your appointment   Please call [phone] with any problems or questions regarding your therapy.        August 2017 Details    Sun Mon Tue Wed Thu Fri Sat       1               2               3               4               5                 6               7               8               9               10               11               12                 13               14               15               16               17               18               19                 20               21               22               23               24               25               26                 27               28               29      5 mg   See details      30      2.5 mg         31      5 mg            Date Details   08/29 This INR check               How to take your warfarin dose     To take:  2.5 mg Take 0.5 of a 5 mg tablet.    To take:  5 mg Take 1 of the 5 mg tablets.           September 2017 Details    Sun Mon Tue Wed Thu Fri Sat          1      2.5 mg         2      2.5 mg           3      2.5 mg         4      2.5 mg         5      5 mg         6      2.5 mg         7      5 mg          8      2.5 mg         9      2.5 mg           10      2.5 mg         11      2.5 mg         12      5 mg         13      2.5 mg         14      5 mg         15      2.5 mg         16      2.5 mg           17      2.5 mg         18      2.5 mg         19      5 mg         20      2.5 mg         21      5 mg         22      2.5 mg         23      2.5 mg           24      2.5 mg         25      2.5 mg         26      5 mg         27      2.5 mg         28      5 mg         29      2.5 mg         30      2.5 mg          Date Details   No additional details            How to take your warfarin dose     To take:  2.5 mg Take 0.5 of a 5 mg tablet.    To take:  5 mg Take 1 of the 5 mg tablets.           October 2017 Details    Sun Mon Tue Wed Thu Fri Sat     1      2.5 mg         2      2.5 mg         3      5 mg         4      2.5 mg         5      5 mg         6      2.5 mg         7      2.5 mg           8      2.5 mg         9      2.5 mg         10            11               12               13               14                 15               16               17               18               19               20               21                 22               23               24               25               26               27               28                 29               30               31                    Date Details   No additional details    Date of next INR:  10/10/2017         How to take your warfarin dose     To take:  2.5 mg Take 0.5 of a 5 mg tablet.    To take:  5 mg Take 1 of the 5 mg tablets.

## 2017-09-26 DIAGNOSIS — I48.91 ATRIAL FIBRILLATION (H): ICD-10-CM

## 2017-09-27 RX ORDER — SOTALOL HYDROCHLORIDE 80 MG/1
40 TABLET ORAL 2 TIMES DAILY
Qty: 90 TABLET | Refills: 1 | Status: SHIPPED | OUTPATIENT
Start: 2017-09-27 | End: 2018-04-06

## 2017-10-10 ENCOUNTER — ANTICOAGULATION THERAPY VISIT (OUTPATIENT)
Dept: NURSING | Facility: CLINIC | Age: 67
End: 2017-10-10
Payer: COMMERCIAL

## 2017-10-10 DIAGNOSIS — I48.91 ATRIAL FIBRILLATION, UNSPECIFIED TYPE (H): ICD-10-CM

## 2017-10-10 DIAGNOSIS — Z79.01 LONG-TERM (CURRENT) USE OF ANTICOAGULANTS: ICD-10-CM

## 2017-10-10 LAB — INR POINT OF CARE: 2.6 (ref 0.86–1.14)

## 2017-10-10 PROCEDURE — 99207 ZZC NO CHARGE NURSE ONLY: CPT

## 2017-10-10 PROCEDURE — 85610 PROTHROMBIN TIME: CPT | Mod: QW

## 2017-10-10 PROCEDURE — 36416 COLLJ CAPILLARY BLOOD SPEC: CPT

## 2017-10-10 NOTE — PROGRESS NOTES
ANTICOAGULATION FOLLOW-UP CLINIC VISIT    Patient Name:  Haresh Rock  Date:  10/10/2017  Contact Type:  Face to Face    SUBJECTIVE:     Patient Findings     Positives No Problem Findings    Comments Pt will have appt on 11/21/17, and will have INR checked at that appt.            OBJECTIVE    INR Protime   Date Value Ref Range Status   10/10/2017 2.6 (A) 0.86 - 1.14 Final     Factor 2 Assay   Date Value Ref Range Status   05/01/2007 58 (L) 60 - 140 % Final     Comment:     (Note)  CALLED TO TAMARA(INTERV. RADIOLOGY)[id number],        ASSESSMENT / PLAN  INR assessment THER    Recheck INR In: 6 WEEKS    INR Location Clinic      Anticoagulation Summary as of 10/10/2017     INR goal 2.0-3.0   Today's INR 2.6   Maintenance plan 5 mg (5 mg x 1) on Tue, Thu; 2.5 mg (5 mg x 0.5) all other days   Full instructions 5 mg on Tue, Thu; 2.5 mg all other days   Weekly total 22.5 mg   No change documented Max Garg RN   Plan last modified Belinda Francis RN (4/19/2016)   Next INR check 11/21/2017   Priority INR   Target end date Indefinite    Indications   Long-term (current) use of anticoagulants [Z79.01] [Z79.01]  Atrial fibrillation (H) [I48.91]         Anticoagulation Episode Summary     INR check location     Preferred lab     Send INR reminders to [facility]    Comments       Anticoagulation Care Providers     Provider Role Specialty Phone number    Anya Nowak MD [facility] Practice [phone]            See the Encounter Report to view Anticoagulation Flowsheet and Dosing Calendar (Go to Encounters tab in chart review, and find the Anticoagulation Therapy Visit)    Dosage adjustment made based on physician directed care plan.    Max Garg, KOSTAS

## 2017-10-10 NOTE — MR AVS SNAPSHOT
Haresh Rock   10/10/2017 10:45 AM   Anticoagulation Therapy Visit    Description:  67 year old male   Provider:  [facility]   Department:  [facility]           INR as of 10/10/2017     Today's INR 2.6      Anticoagulation Summary as of 10/10/2017     INR goal 2.0-3.0   Today's INR 2.6   Full instructions 5 mg on Tue, Thu; 2.5 mg all other days   Next INR check 11/21/2017    Indications   Long-term (current) use of anticoagulants [Z79.01] [Z79.01]  Atrial fibrillation (H) [I48.91]         Contact Numbers     [facility] [address]  Please call [phone] to cancel and/or reschedule your appointment   Please call [phone] with any problems or questions regarding your therapy.        October 2017 Details    Sun Mon Tue Wed Thu Fri Sat     1               2               3               4               5               6               7                 8               9               10      5 mg   See details      11      2.5 mg         12      5 mg         13      2.5 mg         14      2.5 mg           15      2.5 mg         16      2.5 mg         17      5 mg         18      2.5 mg         19      5 mg         20      2.5 mg         21      2.5 mg           22      2.5 mg         23      2.5 mg         24      5 mg         25      2.5 mg         26      5 mg         27      2.5 mg         28      2.5 mg           29      2.5 mg         30      2.5 mg         31      5 mg              Date Details   10/10 This INR check               How to take your warfarin dose     To take:  2.5 mg Take 0.5 of a 5 mg tablet.    To take:  5 mg Take 1 of the 5 mg tablets.           November 2017 Details    Sun Mon Tue Wed Thu Fri Sat        1      2.5 mg         2      5 mg         3      2.5 mg         4      2.5 mg           5      2.5 mg         6      2.5 mg         7      5 mg         8      2.5 mg         9      5 mg         10      2.5 mg         11      2.5 mg           12      2.5 mg         13      2.5 mg         14       5 mg         15      2.5 mg         16      5 mg         17      2.5 mg         18      2.5 mg           19      2.5 mg         20      2.5 mg         21            22               23               24               25                 26               27               28               29               30                  Date Details   No additional details    Date of next INR:  11/21/2017         How to take your warfarin dose     To take:  2.5 mg Take 0.5 of a 5 mg tablet.    To take:  5 mg Take 1 of the 5 mg tablets.

## 2017-10-13 ASSESSMENT — ENCOUNTER SYMPTOMS
LIGHT-HEADEDNESS: 0
PALPITATIONS: 1
LEG SWELLING: 0
SYNCOPE: 0
SLEEP DISTURBANCES DUE TO BREATHING: 0
EXERCISE INTOLERANCE: 0
CLAUDICATION: 0
HYPOTENSION: 0
TACHYCARDIA: 1
LEG PAIN: 0
ORTHOPNEA: 1
HYPERTENSION: 0

## 2017-10-15 ENCOUNTER — PRE VISIT (OUTPATIENT)
Dept: CARDIOLOGY | Facility: CLINIC | Age: 67
End: 2017-10-15

## 2017-10-15 DIAGNOSIS — Z79.899 ENCOUNTER FOR MONITORING SOTALOL THERAPY: Primary | ICD-10-CM

## 2017-10-15 DIAGNOSIS — Z51.81 ENCOUNTER FOR MONITORING SOTALOL THERAPY: Primary | ICD-10-CM

## 2017-10-16 ENCOUNTER — OFFICE VISIT (OUTPATIENT)
Dept: CARDIOLOGY | Facility: CLINIC | Age: 67
End: 2017-10-16
Attending: NURSE PRACTITIONER
Payer: MEDICARE

## 2017-10-16 VITALS
OXYGEN SATURATION: 98 % | HEART RATE: 68 BPM | DIASTOLIC BLOOD PRESSURE: 77 MMHG | SYSTOLIC BLOOD PRESSURE: 123 MMHG | WEIGHT: 208.7 LBS | HEIGHT: 72 IN | BODY MASS INDEX: 28.27 KG/M2

## 2017-10-16 DIAGNOSIS — I48.0 PAROXYSMAL ATRIAL FIBRILLATION (H): Primary | ICD-10-CM

## 2017-10-16 DIAGNOSIS — Z23 NEED FOR PROPHYLACTIC VACCINATION AND INOCULATION AGAINST INFLUENZA: ICD-10-CM

## 2017-10-16 PROCEDURE — 99213 OFFICE O/P EST LOW 20 MIN: CPT | Mod: ZF

## 2017-10-16 PROCEDURE — 0298T ZZC EXT ECG > 48HR TO 21 DAY REVIEW AND INTERPRETATN: CPT | Performed by: INTERNAL MEDICINE

## 2017-10-16 PROCEDURE — 0296T ZIO PATCH HOLTER: CPT | Mod: ZF | Performed by: NURSE PRACTITIONER

## 2017-10-16 PROCEDURE — 99214 OFFICE O/P EST MOD 30 MIN: CPT | Mod: ZP | Performed by: NURSE PRACTITIONER

## 2017-10-16 PROCEDURE — 93010 ELECTROCARDIOGRAM REPORT: CPT | Mod: ZP | Performed by: INTERNAL MEDICINE

## 2017-10-16 RX ORDER — METOPROLOL SUCCINATE 25 MG/1
25 TABLET, EXTENDED RELEASE ORAL DAILY
Qty: 90 TABLET | Refills: 3 | Status: SHIPPED | OUTPATIENT
Start: 2017-10-16 | End: 2017-12-04

## 2017-10-16 ASSESSMENT — PAIN SCALES - GENERAL: PAINLEVEL: NO PAIN (0)

## 2017-10-16 NOTE — LETTER
10/16/2017      RE: Haresh Rock  [address] [place] [address]       Dear Colleague,    Thank you for the opportunity to participate in the care of your patient, Haresh Rock, at the [facility] at [facility]. Please see a copy of my visit note below.    Clinical Cardiac Electrophysiology        HPI:  Paroxsymal atrial fibrillation     Mr. Haresh Rock is a very pleasant 67-year-old gentleman who is status post circumferential pulmonary vein isolation for paroxysmal atrial fibrillation in 03/2013.  His past medical history also includes renal HTN, s/p bone marrow transplant, hemochromatosis, Hodgkin's lymphoma, CKD stake 2, DVT, DMII.      8/15/2017  He saw Dr. Matt and is on warfarin for stroke prophylaxis and also sotalol 40 mg twice daily.  In the last 1-1/2 years, the patient has done very well without any recurrent atrial fibrillation episodes.  The patient does have the Ocutecr bia that he checks on a regular basis and he has not identified any atrial fibrillation episodes.     10/17/2017  Today he presents with Ocutecr and an apple watch with monitor his heart rates.  According to his device he is having almost daily HR over 100 bpm but he is asymptomatic.   Feels pressure intermittently on his left chest without activity.  Able to yard work without CV symptoms.   Home BP is 120/70.   Denies headaches, dizziness, syncope, angina, dyspnea at rest or with exertion, palpitations, orthopnea, PND, abdominal pain, pedal edema, claudication, or any new numbness/weakness, hematuria, hematochezia, and epistaxis.    Today's EKG sinus rhythm with PVCs with ventricular rate 65    Current cardiac medications sotalol 40 mg BID, warfarin, simvastatin,         PAST MEDICAL HISTORY:  Past Medical History:   Diagnosis Date     Antiphospholipid antibody syndrome (H) 2005    Ravi's     Atrial fibrillation (H) 4/2011     Cirrhosis (H)      CKD (chronic kidney disease)  stage 1, GFR 90 ml/min or greater      CKD (chronic kidney disease) stage 2, GFR 60-89 ml/min      Diabetes mellitus type II     steroid induced     DJD (degenerative joint disease) of knee     SHAWN, worse on right     DVT (deep venous thrombosis) (H)      ED (erectile dysfunction)      Gallbladder polyp 5/2010     Oyowq-Dwsltp-Azck Disease 2007    BMT     Hemochromatosis      Hodgkin's disease NOS     5 cycles of ABVD in 2011     HTN (hypertension)      Hyperlipidaemia LDL goal <100      Myeloproliferative disorder (H)     atypical, [facility]     PE (pulmonary embolism) 11/2004     Polyneuropathy      Primary pulmonary hypertension (H)        CURRENT MEDICATIONS:  Current Outpatient Prescriptions   Medication Sig Dispense Refill     sotalol (BETAPACE) 80 MG tablet Take 0.5 tablets (40 mg) by mouth 2 times daily Follow up visit needed with Dr Matt 90 tablet 1     PREVIDENT 5000 DRY MOUTH 1.1 % GEL topical gel Take by mouth daily  3     amoxicillin (AMOXIL) 500 MG capsule Take 500 mg by mouth as needed       folic acid (FOLVITE) 1 MG tablet Take 1 tablet (1,000 mcg) by mouth daily 90 tablet 3     pantoprazole (PROTONIX) 20 MG EC tablet Take 1 tablet (20 mg) by mouth daily 90 tablet 3     warfarin (COUMADIN) 5 MG tablet 5 mg on Tue, Thu; 2.5 mg all other days or as directed by coumadin clinic 60 tablet 5     simvastatin (ZOCOR) 10 MG tablet TAKE 1 TABLET (10 MG) BY MOUTH AT BEDTIME 90 tablet 3     fluticasone (FLONASE) 50 MCG/ACT nasal spray SPRAY 1-2 SPRAYS INTO BOTH NOSTRILS DAILY NEEDS TO BE SEEN FOR FURTHER REFILLS 16 mL 11     Pseudoeph-Doxylamine-DM-APAP (NYQUIL PO) Take by mouth as needed       amoxicillin (AMOXIL) 875 MG tablet Take 1 tablet (875 mg) by mouth 2 times daily (Patient taking differently: Take 875 mg by mouth 2 times daily Prior to dental appts) 20 tablet 0     ARTIFICIAL TEAR SOLUTION OP Apply  to eye. USE AS DIRECTED(DF)       CENTRUM OR 1 TABLET DAILY         PAST SURGICAL HISTORY:  Past Surgical  History:   Procedure Laterality Date     ANESTHESIA CARDIOVERSION  4/21/2011    Procedure:ANESTHESIA CARDIOVERSION; Surgeon:GENERIC ANESTHESIA PROVIDER; Location:[facility] OR     ARTHROSCOPY KNEE RT/LT      LT     COLONOSCOPY  10/16/2012    Procedure: COLONOSCOPY;  Colonoscopy, screening;  Surgeon: Reg Feliciano MD;  Location: [facility] OR     H ABLATION FOCAL AFIB  3/5/2013    PVI     HC BONE MARROW/STEM TRANSPLANT ALLOGENIC  5/8/2007     INSERT PORT VASCULAR ACCESS       JOINT REPLACEMTN, KNEE RT/LT  3/28/12    SHAWN     VASECTOMY  1990       ALLERGIES:     Allergies   Allergen Reactions     No Known Drug Allergy        FAMILY HISTORY:  Family History   Problem Relation Age of Onset     Hypertension Mother      CEREBROVASCULAR DISEASE Mother      Alzheimer Disease Father      DIABETES Paternal Aunt      GASTROINTESTINAL DISEASE Maternal Grandmother      colitis      Thyroid Disease Sister      CANCER No family hx of      C.A.D. No family hx of      Thyroid Cancer No family hx of        SOCIAL HISTORY:  Social History   Substance Use Topics     Smoking status: Never Smoker     Smokeless tobacco: Never Used     Alcohol use No       ROS:   CONSTITUTIONAL:No report of fever, chills, or change in weight  RESPIRATORY: No cough, wheezing, SOB, or hemoptysis  CARDIOVASCULAR: see HPI  MUSCULO-SKELETAL: No joint pain/swelling, no muscle pain  NEURO: No paresthesias, syncope, pre-syncope, light headness, dizziness or vertigo  ENDOCRINE: No temperature intolerance, no skin/hair changes  PSYCHIATRIC: No change in mood, feeling down/anxious, no change in sleep or appetite  GI: no melena or hematochezia, no change in bowel habits  : no hematuria or dysurea, no hesitancy, dribbling or incontinence  HEME: no easy bruising or bleeding, no history of anemia, no history of blood clots  SKIN: no rashes or sores, no unusual itching  Conflicting answers have been found for some questions. Please document the patient's answers manually.      Exam:  /77 (BP Location: Left arm, Cuff Size: Adult Regular)  Pulse 68  Ht 1.829 m (6')  Wt 94.7 kg (208 lb 11.2 oz)  SpO2 98%  BMI 28.3 kg/m2  GENERAL APPEARANCE: elderly male who is alert and no distress  HEENT: no icterus, no xanthelasmas, normal pupil size and reaction, normal palate, mucosa moist, no central cyanosis  NECK: no adenopathy, no asymmetry, masses, or scars, and no bruits, JVP not elevated  RESPIRATORY: lungs clear to auscultation - no rales, rhonchi or wheezes, no use of accessory muscles, no retractions, respirations are unlabored, normal respiratory rate  CARDIOVASCULAR: regular rhythm, normal S1 with physiologic split S2, no S3 or S4 and no murmur, click or rub, precordium quiet with normal PMI.  ABDOMEN: soft, non tender, without hepatosplenomegaly, no masses palpable, bowel sounds normal, no abdominal bruits  EXTREMITIES: peripheral pulses normal, no edema, no bruits  NEURO: alert and oriented to person/place/time, normal speech, gait and affect  VASC: Radial, dorsalis pedis and posterior tibialis pulses are normal in volumes and symmetric bilaterally.   SKIN: no ecchymoses, no rashes    Labs:  CBC RESULTS:   Lab Results   Component Value Date    WBC 7.9 05/30/2017    RBC 4.93 05/30/2017    HGB 16.7 05/30/2017    HCT 45.8 05/30/2017    MCV 93 05/30/2017    MCH 33.9 (H) 05/30/2017    MCHC 36.5 05/30/2017    RDW 12.0 05/30/2017     (L) 05/30/2017       BMP RESULTS:  Lab Results   Component Value Date     05/30/2017    POTASSIUM 4.7 05/30/2017    CHLORIDE 109 05/30/2017    CO2 19 (L) 05/30/2017    ANIONGAP 12 05/30/2017     (H) 05/30/2017    BUN 24 05/30/2017    CR 0.99 05/30/2017    GFRESTIMATED 75 05/30/2017    GFRESTBLACK >90   GFR Calc   05/30/2017    GILBERT 8.1 (L) 05/30/2017        INR RESULTS:  Lab Results   Component Value Date    INR 2.6 (A) 10/10/2017    INR 3.3 (A) 08/29/2017    INR 2.5 (A) 07/18/2017    INR 2.38 (H) 05/30/2017    INR 2.2  (A) 2017    INR 2.15 (H) 2016    INR 2.98 (H) 2016    INR 2.47 (H) 2015       Procedures:  Echocardiogram: No results found for this or any previous visit (from the past 8760 hour(s)).    Assessment and Plan:     Atrial Fibrillation  We discussed in detail with the patient management/treatment options for Alison includin. Stroke Prophylaxis:  CHADSVASC=Age, HTN  2, corresponding to a 2.2% annual stroke / systemic emolism event rate. indicating need for long term oral anticoagulation.  He is appropriately on warfarin with INRs between 2-3.    2. Rate Control: Currently on sotalol 40 mg twice a day.  Will start metoprolol XL 25 mg due to high recorded heart rates, place a zio patch, and an ECHO.    3. Rhythm Control: Cardioversion, Antiarrhythmics and/or ablation are options for rhythm control.  He has a history of a pulmonary vein isolation in .  He is also taking sotolol 40 mg BID as an antirhythmic medication.   Will proceed with zio patch to assess high heart rates and an ECHO.      4. Risk Factor Management: Good BP control.         HTN  - Good BP control      Follow up in 1 year unless ECHO or zio patch is abnormal.        DEJAH Huynh CNP

## 2017-10-16 NOTE — PATIENT INSTRUCTIONS
Cardiology Provider you saw in clinic today: DEJAH Fatima, CNP    Medication Changes:  Start metoprolol XL 25 mg daily.      Labs/Tests needed:  ECHO      Follow-up Visit:  1 year or earlier if ECHO is abnormal.      Further Instructions:      You will receive all normal lab and testing results via [facility] or mail if not reviewed in clinic today. Please contact our office if you need assistance with setting up MyChart.    If you need a medication refill please contact your pharmacy. Please allow 3 business days for your refill to be completed.     As always, thank you for trusting us with your health care needs!    If you have any questions regarding your visit please contact your care team:   Cardiology  Telephone Number    EP RN  Electrophysiology Nurse Coordinator [phone]     Call for EP procedure scheduling concerns  GAUTAM Waller  [phone]           Device Clinic (Pacemakers, ICDs, Loop)   RN's : Tatiana Barbosa Connie, Dawn During business hours: [phone]    After business hours:   [phone]- select option 4 and ask for job code [phone].

## 2017-10-16 NOTE — MR AVS SNAPSHOT
After Visit Summary   10/16/2017    Haresh Rock    MRN: [id number]           Patient Information     Date Of Birth          1950        Visit Information        Provider Department      10/16/2017 10:30 AM Bonnie Ford APRN CNP [facility]        Today's Diagnoses     Paroxysmal atrial fibrillation (H)    -  1    Need for prophylactic vaccination and inoculation against influenza          Care Instructions    Cardiology Provider you saw in clinic today: DEJAH Fatima, CNP    Medication Changes:  Start metoprolol XL 25 mg daily.      Labs/Tests needed:  ECHO      Follow-up Visit:  1 year or earlier if ECHO is abnormal.      Further Instructions:      You will receive all normal lab and testing results via EVERYWAREhart or mail if not reviewed in clinic today. Please contact our office if you need assistance with setting up MyChart.    If you need a medication refill please contact your pharmacy. Please allow 3 business days for your refill to be completed.     As always, thank you for trusting us with your health care needs!    If you have any questions regarding your visit please contact your care team:   Cardiology  Telephone Number    EP RN  Electrophysiology Nurse Coordinator [phone]     Call for EP procedure scheduling concerns  GAUTAM Waller  [phone]           Device Clinic (Pacemakers, ICDs, Loop)   RN's : Tatiana Barbosa Connie, Dawn During business hours: [phone]    After business hours:   [phone]- select option 4 and ask for job code [id number].                  Follow-ups after your visit        Follow-up notes from your care team     Return in about 1 year (around 10/16/2018) for LANCE, Bonnie Ford NP.      Your next 10 appointments already scheduled     Nov 17, 2017 10:00 AM CST   (Arrive by 9:45 AM)   CT SOFT TISSUE NECK W CONTRAST with UCCT1   [facility] CT ([facility])    [address]  [address]  1st Floor  [place] [id number] [phone]           Please bring any scans or X-rays taken at other hospitals, if similar tests were done. Also bring a list of your medicines, including vitamins, minerals and over-the-counter drugs. It is safest to leave personal items at home.  Be sure to tell your doctor:   If you have any allergies.   If there s any chance you are pregnant.   If you are breastfeeding.   If you have any special needs.  You will have contrast for this exam. To prepare:   Do not eat or drink for 2 hours before your exam. If you need to take medicine, you may take it with small sips of water. (We may ask you to take liquid medicine as well.)   The day before your exam, drink extra fluids at least six 8-ounce glasses (unless your doctor tells you to restrict your fluids).  Patients over 70 or patients with diabetes or kidney problems:   If you haven t had a blood test (creatinine test) within the last 30 days, go to your clinic or Diagnostic Imaging Department for this test.  If you have diabetes:   If your kidney function is normal, continue taking your metformin (Avandamet, Glucophage, Glucovance, Metaglip) on the day of your exam.   If your kidney function is abnormal, wait 48 hours before restarting this medicine.  Please wear loose clothing, such as a sweat suit or jogging clothes. Avoid snaps, zippers and other metal. We may ask you to undress and put on a hospital gown.  If you have any questions, please call the Imaging Department where you will have your exam.            Nov 17, 2017 10:30 AM CST   [facility] Lab Draw with  MASONIC LAB DRAW   [facility] Masonic Lab Draw ([facility])    [address]  2nd Floor  [place] [id number] [phone]            Nov 21, 2017  8:30 AM CST   Ech Complete with [id number]   [facility] Echo ([facility])    [address]  3rd Floor  [place] [id number] [phone]            1. Please bring or wear a comfortable two-piece outfit. 2. You may eat, drink and take your normal medicines. 3. For any questions that cannot be answered, please contact the ordering physician            Nov 21, 2017 10:30 AM CST   Return with Augusto Miller MD   [facility] Blood and Marrow Transplant ([facility])    [address] [id number] [phone]              Future tests that were ordered for you today     Open Future Orders        Priority Expected Expires Ordered    Echocardiogram Complete Routine  10/16/2018 10/16/2017    EKG 12-lead, tracing only (Future) Routine  2/12/2018 10/15/2017            Who to contact     If you have questions or need follow up information about today's clinic visit or your schedule please contact [facility] directly at [phone].  Normal or non-critical lab and imaging results will be communicated to you by MyChart, letter or phone within 4 business days after the clinic has received the results. If you do not hear from us within 7 days, please contact the clinic through CatchSquarehart or phone. If you have a critical or abnormal lab result, we will notify you by phone as soon as possible.  Submit refill requests through ConfortVisuel or call your pharmacy and they will forward the refill request to us. Please allow 3 business days for your refill to be completed.          Additional Information About Your Visit        MyChart Information     ConfortVisuel gives you secure access to your electronic health record. If you see a primary care provider, you can also send messages to your care team and make appointments. If you have questions, please call your primary care clinic.  If you do not have a primary care provider, please call [phone] and they will assist you.        Care EveryWhere ID     This is your Care EveryWhere ID. This could be used by other organizations to access your [facility]  records  [id number]        Your Vitals Were     Pulse Height Pulse Oximetry BMI (Body Mass Index)          68 1.829 m (6') 98% 28.3 kg/m2         Blood Pressure from Last 3 Encounters:   10/16/17 123/77   05/30/17 120/71   03/24/17 123/71    Weight from Last 3 Encounters:   10/16/17 94.7 kg (208 lb 11.2 oz)   05/30/17 94.2 kg (207 lb 11.2 oz)   03/24/17 94.5 kg (208 lb 4.8 oz)              We Performed the Following     FLU VACCINE, INCREASED ANTIGEN, PRESV FREE, AGE 65+ [50122]          Today's Medication Changes          These changes are accurate as of: 10/16/17 12:11 PM.  If you have any questions, ask your nurse or doctor.               Start taking these medicines.        Dose/Directions    metoprolol 25 MG 24 hr tablet   Commonly known as:  TOPROL-XL   Used for:  Paroxysmal atrial fibrillation (H)   Started by:  Bonnie Ford APRN CNP        Dose:  25 mg   Take 1 tablet (25 mg) by mouth daily   Quantity:  90 tablet   Refills:  3         These medicines have changed or have updated prescriptions.        Dose/Directions    * amoxicillin 875 MG tablet   Commonly known as:  AMOXIL   This may have changed:  additional instructions   Used for:  Acute sinusitis with symptoms > 10 days   Changed by:  Jose Manuel Gallegos MD        Dose:  875 mg   Take 1 tablet (875 mg) by mouth 2 times daily   Quantity:  20 tablet   Refills:  0       * amoxicillin 500 MG capsule   Commonly known as:  AMOXIL   This may have changed:  Another medication with the same name was changed. Make sure you understand how and when to take each.   Used for:  Hodgkin's disease of extranodal or solid organ site (H), Status post bone marrow transplant (H), Hereditary hemochromatosis (H), Thyroid nodule   Changed by:  Augusto Miller MD        Dose:  500 mg   Take 500 mg by mouth as needed   Refills:  0       * Notice:  This list has 2 medication(s) that are the same as other medications prescribed for you. Read the  directions carefully, and ask your doctor or other care provider to review them with you.         Where to get your medicines      These medications were sent to [facility]/pharmacy #[id number] - [place], [place] - [address] [place] [address]     Phone:  [phone]     metoprolol 25 MG 24 hr tablet                Primary Care Provider Office Phone # Fax #    Anya Nowak [phone] [address]  [facility] [address]        Equal Access to Services     [facility]: Hadii aad ku hadasho Soomaali, waaxda luqadaha, qaybta kaalmada adeegyada, waxay idiin hayaan adeeg juan baron . So [facility] [phone].    ATENCIÓN: Si habla español, tiene a barger disposición servicios gratuitos de asistencia lingüística. [facility] [phone].    We comply with applicable federal civil rights laws and [place] laws. We do not discriminate on the basis of race, color, national origin, age, disability, sex, sexual orientation, or gender identity.            Thank you!     Thank you for choosing [facility]  for your care. Our goal is always to provide you with excellent care. Hearing back from our patients is one way we can continue to improve our services. Please take a few minutes to complete the written survey that you may receive in the mail after your visit with us. Thank you!             Your Updated Medication List - Protect others around you: Learn how to safely use, store and throw away your medicines at www.disposemymeds.org.          This list is accurate as of: 10/16/17 12:11 PM.  Always use your most recent med list.                   Brand Name Dispense Instructions for use Diagnosis    * amoxicillin 875 MG tablet    AMOXIL    20 tablet    Take 1 tablet (875 mg) by mouth 2 times daily    Acute sinusitis with symptoms > 10 days       * amoxicillin 500 MG capsule    AMOXIL     Take 500 mg by mouth as needed    Hodgkin's disease of extranodal or solid organ site  (H), Status post bone marrow transplant (H), Hereditary hemochromatosis (H), Thyroid nodule       ARTIFICIAL TEAR SOLUTION OP      Apply  to eye. USE AS DIRECTED(DF)        CENTRUM PO      1 TABLET DAILY        fluticasone 50 MCG/ACT spray    FLONASE    16 mL    SPRAY 1-2 SPRAYS INTO BOTH NOSTRILS DAILY NEEDS TO BE SEEN FOR FURTHER REFILLS    Chronic rhinitis       folic acid 1 MG tablet    FOLVITE    90 tablet    Take 1 tablet (1,000 mcg) by mouth daily    Opyga-uanruq-vjeg disease, chronic (H), Lymphoma (H)       metoprolol 25 MG 24 hr tablet    TOPROL-XL    90 tablet    Take 1 tablet (25 mg) by mouth daily    Paroxysmal atrial fibrillation (H)       NYQUIL PO      Take by mouth as needed        pantoprazole 20 MG EC tablet    PROTONIX    90 tablet    Take 1 tablet (20 mg) by mouth daily    Oogay-vgmixz-mcrb disease, chronic (H), Hodgkin's disease of extranodal or solid organ site (H)       PREVIDENT 5000 DRY MOUTH 1.1 % Gel topical gel   Generic drug:  sodium fluoride dental gel      Take by mouth daily    Hodgkin's disease of extranodal or solid organ site (H), Status post bone marrow transplant (H), Hereditary hemochromatosis (H), Thyroid nodule       simvastatin 10 MG tablet    ZOCOR    90 tablet    TAKE 1 TABLET (10 MG) BY MOUTH AT BEDTIME    Hyperlipidemia with target LDL less than 100       sotalol 80 MG tablet    BETAPACE    90 tablet    Take 0.5 tablets (40 mg) by mouth 2 times daily Follow up visit needed with Dr Matt    Atrial fibrillation (H)       warfarin 5 MG tablet    COUMADIN    60 tablet    5 mg on Tue, Thu; 2.5 mg all other days or as directed by coumadin clinic    Atrial fibrillation, unspecified type (H), Antiphospholipid antibody syndrome (H)       * Notice:  This list has 2 medication(s) that are the same as other medications prescribed for you. Read the directions carefully, and ask your doctor or other care provider to review them with you.

## 2017-10-16 NOTE — PROGRESS NOTES
Clinical Cardiac Electrophysiology        HPI:  Paroxsymal atrial fibrillation     Mr. Haresh Rock is a very pleasant 67-year-old gentleman who is status post circumferential pulmonary vein isolation for paroxysmal atrial fibrillation in 03/2013.  His past medical history also includes renal HTN, s/p bone marrow transplant, hemochromatosis, Hodgkin's lymphoma, CKD stake 2, DVT, DMII.      8/15/2017  He saw Dr. Matt and is on warfarin for stroke prophylaxis and also sotalol 40 mg twice daily.  In the last 1-1/2 years, the patient has done very well without any recurrent atrial fibrillation episodes.  The patient does have the GATHER & SAVEr bia that he checks on a regular basis and he has not identified any atrial fibrillation episodes.     10/17/2017  Today he presents with GATHER & SAVEr and an apple watch with monitor his heart rates.  According to his device he is having almost daily HR over 100 bpm but he is asymptomatic.   Feels pressure intermittently on his left chest without activity.  Able to yard work without CV symptoms.   Home BP is 120/70.   Denies headaches, dizziness, syncope, angina, dyspnea at rest or with exertion, palpitations, orthopnea, PND, abdominal pain, pedal edema, claudication, or any new numbness/weakness, hematuria, hematochezia, and epistaxis.    Today's EKG sinus rhythm with PVCs with ventricular rate 65    Current cardiac medications sotalol 40 mg BID, warfarin, simvastatin,         PAST MEDICAL HISTORY:  Past Medical History:   Diagnosis Date     Antiphospholipid antibody syndrome (H) 2005    Ravi's     Atrial fibrillation (H) 4/2011     Cirrhosis (H)      CKD (chronic kidney disease) stage 1, GFR 90 ml/min or greater      CKD (chronic kidney disease) stage 2, GFR 60-89 ml/min      Diabetes mellitus type II     steroid induced     DJD (degenerative joint disease) of knee     SHAWN, worse on right     DVT (deep venous thrombosis) (H)      ED (erectile dysfunction)      Gallbladder polyp  5/2010     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     Hemochromatosis      Hodgkin's disease NOS     5 cycles of ABVD in 2011     HTN (hypertension)      Hyperlipidaemia LDL goal <100      Myeloproliferative disorder (H)     atypical, [facility]     PE (pulmonary embolism) 11/2004     Polyneuropathy      Primary pulmonary hypertension (H)        CURRENT MEDICATIONS:  Current Outpatient Prescriptions   Medication Sig Dispense Refill     sotalol (BETAPACE) 80 MG tablet Take 0.5 tablets (40 mg) by mouth 2 times daily Follow up visit needed with Dr Matt 90 tablet 1     PREVIDENT 5000 DRY MOUTH 1.1 % GEL topical gel Take by mouth daily  3     amoxicillin (AMOXIL) 500 MG capsule Take 500 mg by mouth as needed       folic acid (FOLVITE) 1 MG tablet Take 1 tablet (1,000 mcg) by mouth daily 90 tablet 3     pantoprazole (PROTONIX) 20 MG EC tablet Take 1 tablet (20 mg) by mouth daily 90 tablet 3     warfarin (COUMADIN) 5 MG tablet 5 mg on Tue, Thu; 2.5 mg all other days or as directed by coumadin clinic 60 tablet 5     simvastatin (ZOCOR) 10 MG tablet TAKE 1 TABLET (10 MG) BY MOUTH AT BEDTIME 90 tablet 3     fluticasone (FLONASE) 50 MCG/ACT nasal spray SPRAY 1-2 SPRAYS INTO BOTH NOSTRILS DAILY NEEDS TO BE SEEN FOR FURTHER REFILLS 16 mL 11     Pseudoeph-Doxylamine-DM-APAP (NYQUIL PO) Take by mouth as needed       amoxicillin (AMOXIL) 875 MG tablet Take 1 tablet (875 mg) by mouth 2 times daily (Patient taking differently: Take 875 mg by mouth 2 times daily Prior to dental appts) 20 tablet 0     ARTIFICIAL TEAR SOLUTION OP Apply  to eye. USE AS DIRECTED(DF)       CENTRUM OR 1 TABLET DAILY         PAST SURGICAL HISTORY:  Past Surgical History:   Procedure Laterality Date     ANESTHESIA CARDIOVERSION  4/21/2011    Procedure:ANESTHESIA CARDIOVERSION; Surgeon:GENERIC ANESTHESIA PROVIDER; Location:[facility] OR     ARTHROSCOPY KNEE RT/LT      LT     COLONOSCOPY  10/16/2012    Procedure: COLONOSCOPY;  Colonoscopy, screening;  Surgeon: Braden  Reg LEAVITT MD;  Location: [facility] OR     H ABLATION FOCAL AFIB  3/5/2013    PVI     HC BONE MARROW/STEM TRANSPLANT ALLOGENIC  5/8/2007     INSERT PORT VASCULAR ACCESS       JOINT REPLACEMTN, KNEE RT/LT  3/28/12    SHAWN     VASECTOMY  1990       ALLERGIES:     Allergies   Allergen Reactions     No Known Drug Allergy        FAMILY HISTORY:  Family History   Problem Relation Age of Onset     Hypertension Mother      CEREBROVASCULAR DISEASE Mother      Alzheimer Disease Father      DIABETES Paternal Aunt      GASTROINTESTINAL DISEASE Maternal Grandmother      colitis      Thyroid Disease Sister      CANCER No family hx of      C.A.D. No family hx of      Thyroid Cancer No family hx of        SOCIAL HISTORY:  Social History   Substance Use Topics     Smoking status: Never Smoker     Smokeless tobacco: Never Used     Alcohol use No       ROS:   CONSTITUTIONAL:No report of fever, chills, or change in weight  RESPIRATORY: No cough, wheezing, SOB, or hemoptysis  CARDIOVASCULAR: see HPI  MUSCULO-SKELETAL: No joint pain/swelling, no muscle pain  NEURO: No paresthesias, syncope, pre-syncope, light headness, dizziness or vertigo  ENDOCRINE: No temperature intolerance, no skin/hair changes  PSYCHIATRIC: No change in mood, feeling down/anxious, no change in sleep or appetite  GI: no melena or hematochezia, no change in bowel habits  : no hematuria or dysurea, no hesitancy, dribbling or incontinence  HEME: no easy bruising or bleeding, no history of anemia, no history of blood clots  SKIN: no rashes or sores, no unusual itching  Answers for HPI/ROS submitted by the patient on 10/13/2017   General Symptoms: No  Skin Symptoms: No  HENT Symptoms: No  EYE SYMPTOMS: No  LUNG SYMPTOMS: No  INTESTINAL SYMPTOMS: No  URINARY SYMPTOMS: No  REPRODUCTIVE SYMPTOMS: No  SKELETAL SYMPTOMS: No  BLOOD SYMPTOMS: No  NERVOUS SYSTEM SYMPTOMS: No  MENTAL HEALTH SYMPTOMS: No  Chest pain or pressure: Yes  Fast or irregular heartbeat: Yes  Pain in legs with  walking: No  Swelling in feet or ankles: No  Trouble breathing while lying down: Yes  Fingers or Toes appear blue: No  High blood pressure: No  Low blood pressure: No  Fainting: No  Murmurs: No  Chest pain on exertion: No  Chest pain at rest: No  Cramping pain in leg during exercise: No  Pacemaker: No  Varicose veins: Yes  Edema or swelling: No  Fast heart beat: Yes  Wake up at night with shortness of breath: No  Heart flutters: No  Light-headedness: No  Exercise intolerance: No    Conflicting answers have been found for some questions. Please document the patient's answers manually.     Exam:  /77 (BP Location: Left arm, Cuff Size: Adult Regular)  Pulse 68  Ht 1.829 m (6')  Wt 94.7 kg (208 lb 11.2 oz)  SpO2 98%  BMI 28.3 kg/m2  GENERAL APPEARANCE: elderly male who is alert and no distress  HEENT: no icterus, no xanthelasmas, normal pupil size and reaction, normal palate, mucosa moist, no central cyanosis  NECK: no adenopathy, no asymmetry, masses, or scars, and no bruits, JVP not elevated  RESPIRATORY: lungs clear to auscultation - no rales, rhonchi or wheezes, no use of accessory muscles, no retractions, respirations are unlabored, normal respiratory rate  CARDIOVASCULAR: regular rhythm, normal S1 with physiologic split S2, no S3 or S4 and no murmur, click or rub, precordium quiet with normal PMI.  ABDOMEN: soft, non tender, without hepatosplenomegaly, no masses palpable, bowel sounds normal, no abdominal bruits  EXTREMITIES: peripheral pulses normal, no edema, no bruits  NEURO: alert and oriented to person/place/time, normal speech, gait and affect  VASC: Radial, dorsalis pedis and posterior tibialis pulses are normal in volumes and symmetric bilaterally.   SKIN: no ecchymoses, no rashes    Labs:  CBC RESULTS:   Lab Results   Component Value Date    WBC 7.9 05/30/2017    RBC 4.93 05/30/2017    HGB 16.7 05/30/2017    HCT 45.8 05/30/2017    MCV 93 05/30/2017    MCH 33.9 (H) 05/30/2017    MCHC 36.5  2017    RDW 12.0 2017     (L) 2017       BMP RESULTS:  Lab Results   Component Value Date     2017    POTASSIUM 4.7 2017    CHLORIDE 109 2017    CO2 19 (L) 2017    ANIONGAP 12 2017     (H) 2017    BUN 24 2017    CR 0.99 2017    GFRESTIMATED 75 2017    GFRESTBLACK >90   GFR Calc   2017    GILBERT 8.1 (L) 2017        INR RESULTS:  Lab Results   Component Value Date    INR 2.6 (A) 10/10/2017    INR 3.3 (A) 2017    INR 2.5 (A) 2017    INR 2.38 (H) 2017    INR 2.2 (A) 2017    INR 2.15 (H) 2016    INR 2.98 (H) 2016    INR 2.47 (H) 2015       Procedures:  Echocardiogram: No results found for this or any previous visit (from the past 8760 hour(s)).    Assessment and Plan:     Atrial Fibrillation  We discussed in detail with the patient management/treatment options for Alison includin. Stroke Prophylaxis:  CHADSVASC=Age, HTN  2, corresponding to a 2.2% annual stroke / systemic emolism event rate. indicating need for long term oral anticoagulation.  He is appropriately on warfarin with INRs between 2-3.    2. Rate Control: Currently on sotalol 40 mg twice a day.  Will start metoprolol XL 25 mg due to high recorded heart rates, place a zio patch, and an ECHO.    3. Rhythm Control: Cardioversion, Antiarrhythmics and/or ablation are options for rhythm control.  He has a history of a pulmonary vein isolation in .  He is also taking sotolol 40 mg BID as an antirhythmic medication.   Will proceed with zio patch to assess high heart rates and an ECHO.      4. Risk Factor Management: Good BP control.         HTN  - Good BP control      Follow up in 1 year unless ECHO or zio patch is abnormal.      CC  ESTABLISHED PATIENT

## 2017-10-16 NOTE — NURSING NOTE
Chief Complaint   Patient presents with     Follow Up For     EKG (sotalol)      Vitals were taken and medications were reconciled. EKG was performed    Amber Gordillo MA  10:39 AM    Per Dr. Bonnie Ford, patient to have 14 day Zio monitor placed.  Diagnosis: Paroxysmal Atrial Fibrillation  Monitor placed: Yes  Patient Instructed: Yes  Patient verbalized understanding: Yes  Holter # [id number]  Placed by QASIM Sunshine

## 2017-11-17 ENCOUNTER — ANTICOAGULATION THERAPY VISIT (OUTPATIENT)
Dept: NURSING | Facility: CLINIC | Age: 67
End: 2017-11-17

## 2017-11-17 DIAGNOSIS — Z79.01 LONG-TERM (CURRENT) USE OF ANTICOAGULANTS: ICD-10-CM

## 2017-11-17 DIAGNOSIS — Z94.81 STATUS POST BONE MARROW TRANSPLANT (H): ICD-10-CM

## 2017-11-17 DIAGNOSIS — E04.1 THYROID NODULE: ICD-10-CM

## 2017-11-17 DIAGNOSIS — I48.0 PAROXYSMAL ATRIAL FIBRILLATION (H): ICD-10-CM

## 2017-11-17 DIAGNOSIS — C81.99 HODGKIN'S DISEASE OF EXTRANODAL OR SOLID ORGAN SITE (H): ICD-10-CM

## 2017-11-17 DIAGNOSIS — E83.110 HEREDITARY HEMOCHROMATOSIS (H): ICD-10-CM

## 2017-11-17 LAB
ALBUMIN SERPL-MCNC: 3.4 G/DL (ref 3.4–5)
ALP SERPL-CCNC: 97 U/L (ref 40–150)
ALT SERPL W P-5'-P-CCNC: 17 U/L (ref 0–70)
ANION GAP SERPL CALCULATED.3IONS-SCNC: 6 MMOL/L (ref 3–14)
AST SERPL W P-5'-P-CCNC: 20 U/L (ref 0–45)
BASOPHILS # BLD AUTO: 0.1 10E9/L (ref 0–0.2)
BASOPHILS NFR BLD AUTO: 0.7 %
BILIRUB SERPL-MCNC: 0.9 MG/DL (ref 0.2–1.3)
BUN SERPL-MCNC: 26 MG/DL (ref 7–30)
CALCIUM SERPL-MCNC: 7.8 MG/DL (ref 8.5–10.1)
CHLORIDE SERPL-SCNC: 101 MMOL/L (ref 94–109)
CO2 SERPL-SCNC: 25 MMOL/L (ref 20–32)
CREAT SERPL-MCNC: 1.1 MG/DL (ref 0.66–1.25)
CRP SERPL-MCNC: 5.6 MG/L (ref 0–8)
DIFFERENTIAL METHOD BLD: ABNORMAL
EOSINOPHIL # BLD AUTO: 0.1 10E9/L (ref 0–0.7)
EOSINOPHIL NFR BLD AUTO: 1.9 %
ERYTHROCYTE [DISTWIDTH] IN BLOOD BY AUTOMATED COUNT: 11.9 % (ref 10–15)
GFR SERPL CREATININE-BSD FRML MDRD: 66 ML/MIN/1.7M2
GLUCOSE SERPL-MCNC: 137 MG/DL (ref 70–99)
HCT VFR BLD AUTO: 44.6 % (ref 40–53)
HGB BLD-MCNC: 15.9 G/DL (ref 13.3–17.7)
IMM GRANULOCYTES # BLD: 0 10E9/L (ref 0–0.4)
IMM GRANULOCYTES NFR BLD: 0.3 %
INR PPP: 3.1 (ref 0.86–1.14)
LDH SERPL L TO P-CCNC: 189 U/L (ref 85–227)
LYMPHOCYTES # BLD AUTO: 2.4 10E9/L (ref 0.8–5.3)
LYMPHOCYTES NFR BLD AUTO: 31.5 %
MCH RBC QN AUTO: 33.2 PG (ref 26.5–33)
MCHC RBC AUTO-ENTMCNC: 35.7 G/DL (ref 31.5–36.5)
MCV RBC AUTO: 93 FL (ref 78–100)
MONOCYTES # BLD AUTO: 0.8 10E9/L (ref 0–1.3)
MONOCYTES NFR BLD AUTO: 11.1 %
NEUTROPHILS # BLD AUTO: 4.1 10E9/L (ref 1.6–8.3)
NEUTROPHILS NFR BLD AUTO: 54.5 %
NRBC # BLD AUTO: 0 10*3/UL
NRBC BLD AUTO-RTO: 0 /100
PLATELET # BLD AUTO: 117 10E9/L (ref 150–450)
POTASSIUM SERPL-SCNC: 4.1 MMOL/L (ref 3.4–5.3)
PROT SERPL-MCNC: 6.8 G/DL (ref 6.8–8.8)
RBC # BLD AUTO: 4.79 10E12/L (ref 4.4–5.9)
RETICS # AUTO: 85.3 10E9/L (ref 25–95)
RETICS/RBC NFR AUTO: 1.8 % (ref 0.5–2)
SODIUM SERPL-SCNC: 133 MMOL/L (ref 133–144)
TSH SERPL DL<=0.005 MIU/L-ACNC: 2.53 MU/L (ref 0.4–4)
WBC # BLD AUTO: 7.6 10E9/L (ref 4–11)

## 2017-11-17 PROCEDURE — 86140 C-REACTIVE PROTEIN: CPT | Performed by: INTERNAL MEDICINE

## 2017-11-17 PROCEDURE — 85025 COMPLETE CBC W/AUTO DIFF WBC: CPT | Performed by: INTERNAL MEDICINE

## 2017-11-17 PROCEDURE — 84443 ASSAY THYROID STIM HORMONE: CPT | Performed by: INTERNAL MEDICINE

## 2017-11-17 PROCEDURE — 85045 AUTOMATED RETICULOCYTE COUNT: CPT | Performed by: INTERNAL MEDICINE

## 2017-11-17 PROCEDURE — 83615 LACTATE (LD) (LDH) ENZYME: CPT | Performed by: INTERNAL MEDICINE

## 2017-11-17 PROCEDURE — 85610 PROTHROMBIN TIME: CPT | Performed by: INTERNAL MEDICINE

## 2017-11-17 PROCEDURE — 80053 COMPREHEN METABOLIC PANEL: CPT | Performed by: INTERNAL MEDICINE

## 2017-11-17 NOTE — MR AVS SNAPSHOT
Haresh Rock   11/17/2017   Anticoagulation Therapy Visit    Description:  67 year old male   Provider:  Anya Nowak MD   Department:  [facility]           INR as of 11/17/2017     Today's INR 3.10!      Anticoagulation Summary as of 11/17/2017     INR goal 2.0-3.0   Today's INR 3.10!   Full instructions 5 mg on Tue, Thu; 2.5 mg all other days   Next INR check 12/29/2017    Indications   Long-term (current) use of anticoagulants [Z79.01] [Z79.01]  Atrial fibrillation (H) [I48.91]         Contact Numbers     [facility]  Please call [phone] to cancel and/or reschedule your appointment   Please call [phone] with any problems or questions regarding your therapy.        November 2017 Details    Sun Mon Tue Wed Thu Fri Sat        1               2               3               4                 5               6               7               8               9               10               11                 12               13               14               15               16               17      2.5 mg   See details      18      2.5 mg           19      2.5 mg         20      2.5 mg         21      5 mg         22      2.5 mg         23      5 mg         24      2.5 mg         25      2.5 mg           26      2.5 mg         27      2.5 mg         28      5 mg         29      2.5 mg         30      5 mg            Date Details   11/17 This INR check               How to take your warfarin dose     To take:  2.5 mg Take 0.5 of a 5 mg tablet.    To take:  5 mg Take 1 of the 5 mg tablets.           December 2017 Details    Sun Mon Tue Wed Thu Fri Sat          1      2.5 mg         2      2.5 mg           3      2.5 mg         4      2.5 mg         5      5 mg         6      2.5 mg         7      5 mg         8      2.5 mg         9      2.5 mg           10      2.5 mg         11      2.5 mg         12      5 mg         13      2.5 mg         14      5 mg         15      2.5 mg         16      2.5  mg           17      2.5 mg         18      2.5 mg         19      5 mg         20      2.5 mg         21      5 mg         22      2.5 mg         23      2.5 mg           24      2.5 mg         25      2.5 mg         26      5 mg         27      2.5 mg         28      5 mg         29            30                 31                      Date Details   No additional details    Date of next INR:  12/29/2017         How to take your warfarin dose     To take:  2.5 mg Take 0.5 of a 5 mg tablet.    To take:  5 mg Take 1 of the 5 mg tablets.

## 2017-11-17 NOTE — NURSING NOTE
Chief Complaint   Patient presents with     Blood Draw     Labs only     Labs drawn by IV placed earlier in the day by CT.  See doc flow sheets for details.  Key Reyes CMA

## 2017-11-21 ENCOUNTER — RADIANT APPOINTMENT (OUTPATIENT)
Dept: CARDIOLOGY | Facility: CLINIC | Age: 67
End: 2017-11-21

## 2017-11-21 ENCOUNTER — ONCOLOGY VISIT (OUTPATIENT)
Dept: TRANSPLANT | Facility: CLINIC | Age: 67
End: 2017-11-21
Attending: INTERNAL MEDICINE
Payer: MEDICARE

## 2017-11-21 VITALS
HEART RATE: 63 BPM | SYSTOLIC BLOOD PRESSURE: 138 MMHG | DIASTOLIC BLOOD PRESSURE: 82 MMHG | BODY MASS INDEX: 28.16 KG/M2 | OXYGEN SATURATION: 98 % | WEIGHT: 207.6 LBS | TEMPERATURE: 96.7 F | RESPIRATION RATE: 18 BRPM

## 2017-11-21 DIAGNOSIS — I48.0 PAROXYSMAL ATRIAL FIBRILLATION (H): ICD-10-CM

## 2017-11-21 DIAGNOSIS — E04.1 THYROID NODULE: ICD-10-CM

## 2017-11-21 DIAGNOSIS — C81.99 HODGKIN'S DISEASE OF EXTRANODAL OR SOLID ORGAN SITE (H): Primary | ICD-10-CM

## 2017-11-21 DIAGNOSIS — E83.110 HEREDITARY HEMOCHROMATOSIS (H): ICD-10-CM

## 2017-11-21 RX ADMIN — Medication 5 ML: at 09:45

## 2017-11-21 ASSESSMENT — PAIN SCALES - GENERAL: PAINLEVEL: NO PAIN (0)

## 2017-11-21 NOTE — PROGRESS NOTES
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 Haresh has had some tachycardia and palpitations on occasion.He had Zio patch showing max  some v Tach and SVT primarily. No chest pain or SOB. He had a recent Holter monitoring. He has no other specific symptoms.  He denies fever, chills, nausea or vomiting.  He denies any night sweats.  He denies any bleeding issues.  He remains on warfarin.      PAST MEDICAL HISTORY:  See my note from 05/2017    SOCIAL HISTORY:  See my note from 05/2017     FAMILY HISTORY:  See my note from  05/2017     REVIEW OF SYSTEMS:  A 12-point review of systems is done and is negative, except as in the HPI.      PHYSICAL EXAMINATION:   GENERAL:  He is an alert gentleman in no acute distress.   VITAL SIGNS:  Stable. /82 (BP Location: Right arm, Patient Position: Chair, Cuff Size: Adult Regular)  Pulse 63  Temp 96.7  F (35.9  C) (Oral)  Resp 18  Wt 94.2 kg (207 lb 9.6 oz)  SpO2 98%  BMI 28.16 kg/m2    HEENT:  Normocephalic.  EOM okay, no scleral icterus.  Mouth without lesion.   RESPIRATORY:  Clear to percussion and auscultation.   Neck:  There is some asymmetry to his thyroid.  I feel the right lobe a little more prominently than the left.   LYMPH:  I cannot feel any cervical, supraclavicular, submandibular, axillary or inguinal nodes.   CARDIAC: Today he is in Normal sinus rhythm.  No S3, S4  I hear  1 -2/6 SAYDA at RSB at  [facility].   ABDOMEN:  Soft without hepatosplenomegaly.   EXTREMITIES:  Without edema.   Results for HARESH GRANADOS (MRN [id number]) as of 11/21/2017 10:27    Ref. Range 11/17/2017 09:57 11/17/2017 10:07 11/17/2017 10:29 11/21/2017 08:23 11/21/2017 09:32   Sodium Latest Ref Range: 133 - 144 mmol/L   133     Potassium Latest Ref Range: 3.4 - 5.3 mmol/L   4.1     Chloride Latest Ref Range: 94 - 109 mmol/L   101     Carbon Dioxide Latest Ref Range: 20 - 32 mmol/L   25     Urea Nitrogen Latest Ref Range: 7 - 30 mg/dL   26     Creatinine Latest Ref Range: 0.66 - 1.25 mg/dL 1.1  1.10     GFR Estimate Latest Ref Range: >60 mL/min/1.7m2 66  66     GFR Estimate If Black Latest Ref Range: >60 mL/min/1.7m2 80  80     Calcium Latest Ref Range: 8.5 - 10.1 mg/dL   7.8 (L)     Anion Gap Latest Ref Range: 3 - 14 mmol/L   6     Albumin Latest Ref Range: 3.4 - 5.0 g/dL   3.4     Protein Total Latest Ref Range: 6.8 - 8.8 g/dL   6.8     Bilirubin Total Latest Ref Range: 0.2 - 1.3 mg/dL   0.9     Alkaline Phosphatase Latest Ref Range: 40 - 150 U/L   97     ALT Latest Ref Range: 0 - 70 U/L   17     AST Latest Ref Range: 0 - 45 U/L   20     CRP Inflammation Latest Ref Range: 0.0 - 8.0 mg/L   5.6     Lactate Dehydrogenase Latest Ref Range: 85 - 227 U/L   189     TSH Latest Ref Range: 0.40 - 4.00 mU/L   2.53     Glucose Latest Ref Range: 70 - 99 mg/dL   137 (H)     WBC Latest Ref Range: 4.0 - 11.0 10e9/L   7.6     Hemoglobin Latest Ref Range: 13.3 - 17.7 g/dL   15.9     Hematocrit Latest Ref Range: 40.0 - 53.0 %   44.6     Platelet Count Latest Ref Range: 150 - 450 10e9/L   117 (L)     RBC Count Latest Ref Range: 4.4 - 5.9 10e12/L   4.79     MCV Latest Ref Range: 78 - 100 fl   93     MCH Latest Ref Range: 26.5 - 33.0 pg   33.2 (H)     MCHC Latest Ref Range: 31.5 - 36.5 g/dL   35.7     RDW Latest Ref Range: 10.0 - 15.0 %   11.9     Diff Method Unknown   Automated Method     % Neutrophils Latest Units: %   54.5     % Lymphocytes Latest Units: %   31.5     % Monocytes Latest Units: %   11.1     % Eosinophils Latest Units: %   1.9     % Basophils Latest Units: %   0.7     % Immature Granulocytes  Latest Units: %   0.3     Nucleated RBCs Latest Ref Range: 0 /100   0     Absolute Neutrophil Latest Ref Range: 1.6 - 8.3 10e9/L   4.1     Absolute Lymphocytes Latest Ref Range: 0.8 - 5.3 10e9/L   2.4     Absolute Monocytes Latest Ref Range: 0.0 - 1.3 10e9/L   0.8     Absolute Eosinophils Latest Ref Range: 0.0 - 0.7 10e9/L   0.1     Absolute Basophils Latest Ref Range: 0.0 - 0.2 10e9/L   0.1     Abs Immature Granulocytes Latest Ref Range: 0 - 0.4 10e9/L   0.0     Absolute Nucleated RBC Unknown   0.0     % Retic Latest Ref Range: 0.5 - 2.0 %   1.8     Absolute Retic Latest Ref Range: 25 - 95 10e9/L   85.3     INR Latest Ref Range: 0.86 - 1.14    3.10 (H)     CT CHEST/ABDOMEN/PELVIS W CONTRAST Unknown    Rpt    CT SOFT TISSUE NECK W CONTRAST Unknown  Rpt      ECHO COMPLETE WITH DEFINITY Unknown     Rpt     TECHNIQUE:  Helical CT images from the thoracic inlet through the  symphysis pubis were obtained  with contrast. Contrast dose:  Isovue-370  127cc     COMPARISON: CT CAP 11/26/2015, 2/23/2015 ultrasound-guided thyroid FNA  4/25/2016     HISTORY: Hodgkin's lymphoma status post bone marrow transplant     FINDINGS:     Chest:     Heart size is normal. No pericardial thickening or effusion.  Atherosclerotic calcifications of the aortic valve and aorta/great  vessels without evidence of aneurysmal dilatation. Stable 4 mm right  thyroid nodule with surrounding ill-defined mixed attenuation  unchanged from prior exams with interval biopsy. No central pulmonary  embolism. No pathologic-appearing mediastinal or hilar lymph nodes.  Thoracic esophagus is unremarkable.     No pleural effusion or pneumothorax. The central tracheobronchial tree  is patent. No focal airspace disease. Trace right dependent  atelectasis. Scattered punctate calcified and noncalcified nodules  which are grossly unchanged since 11/16/2015.     Abdomen and pelvis:      No suspicious liver lesion. Unchanged left lobe atrophy. Portal vein  is patent.  Layering calculi in the gallbladder. No gallbladder wall  thickening or pericholecystic fluid. No intra-or extrahepatic biliary  dilatation.      Unchanged atrophy of the pancreas. Unchanged 1 cm pancreatic tail cyst  (series 3 image 284) and neck cyst (series 4, image 63). Normal spleen  and adrenal glands with medial splenule.      Symmetric kidneys without hydronephrosis or nephrolithiasis. Bilateral  renal cortical cysts. Urinary bladder is decompressed but  unremarkable. Mild prostatomegaly. Symmetric seminal vesicles.     Scattered colonic diverticula without adjacent stranding to suggest  diverticulitis. Appendix is normal. Small and large bowel are normal  in caliber.     Unchanged para-aortic lymph nodes. No new or enlarging lymph nodes.     Scattered atherosclerotic calcifications of the abdominal aorta. No  evidence of aneurysmal dilatation.      No free fluid or free air.     Bones and soft tissues: Degenerative changes of the thoracolumbar  spine most prominent at L5-S1 with moderate bilateral neuroforaminal  narrowing. No acute or aggressive appearing osseous lesions.  Pectoralis major intramuscular lipoma on the right. Stable  gynecomastia.          IMPRESSION:   1. Stable para-aortic lymph nodes. No evidence of disease progression.  2. Cholelithiasis and diverticulosis.  3. Stable pancreatic cysts.     ______________________________________________________________________  ____________________  Based on International Consensus Guidelines     Asymptomatic patient without high risk stigmata of malignancy  (obstructive jaundice with cystic lesion in head of the pancreas,  enhancing solid component within cyst, or main pancreatic duct greater  than 10 mm), and without worrisome features (cyst greater than 3 cm,  thickened/enhancing cyst walls, main pancreatic duct 5-9 mm, mural  nodule, or abrupt changing caliber of pancreatic duct with distal  pancreatic atrophy).     Size of largest cyst:  Less than 1  cm: CT or MRI with contrast in 2-3 years.  1-2 cm: CT or MRI with contrast yearly for 2 years, then lengthen  interval if no change.  2-3 cm: EUS in 3-6 months, then can alternate MRI with EUS as  appropriate.  Greater than 3 cm: Close surveillance alternating MRI with US every  3-6 months. Consider surgery in young, fit patients.     Consider gastroenterology consultation for all categories of  pancreatic cysts.     *Reference: International Consensus Guidelines for Management of IPMN  and MCN of the pancreas. Pancreatology: 12:(2012); 183-197.           I have personally reviewed the examination and initial interpretation  and I agree with the findings.     TRACIE CHOWDHURYMD  CT SOFT TISSUE NECK W CONTRAST 11/17/2017 10:07 AM     History:  Hodgkins neck nodes; Hodgkin's disease of extranodal or  solid organ site (H); Status post bone marrow transplant (H);  Hereditary hemochromatosis (H); Thyroid nodule      Comparison:  Neck CT 5/16/2016      Technique: Following intravenous administration of nonionic iodinated  contrast medium, thin section helical CT images were obtained from the  skull base down to the level of the aortic arch.  Axial, coronal and  sagittal reformations were performed with 2-3 mm slice thickness  reconstruction. Images were reviewed in soft tissue, lung and bone  windows.     Contrast: Isovue-370  100cc     Findings:   Evaluation of the mucosal space demonstrates no evident abnormality in  the nasopharynx, oropharynx, hypopharynx or the glottis. The tongue  base appears normal. The major salivary glands appear unremarkable.     There is no evident cervical lymphadenopathy. Few bilateral prominent  subcentimeter sized lymph nodes in the supraclavicular area again  demonstrated. Compared to neck CT from 5/16/2016, these appear  relatively less prominent and less in number. No new lymphadenopathy.  The fascial spaces in the neck are intact bilaterally. The major  vascular structures in the neck appear  unremarkable. Hypoattenuating  subcentimeter right thyroid nodule, unchanged.     Stable multilevel degenerative changes of the cervical spine, most  pronounced at C4-5, C5-6, and C6-7. Moderate associated spinal canal  narrowing at C5-6 and C6-7, unchanged compared to prior. Multilevel  neuroforaminal stenosis. Previously noted mucosal thickening in the  maxillary sinus are completely resolved. Clear paranasal sinuses.  Mastoid air cells are clear.     Visualized apical lungs are unremarkable. Mild scarring in the right  lung apex again demonstrated.         Impression:   1. No evidence of recurrent lymphoma in the neck.  2. Complete resolution of previously noted paranasal sinus  opacifications.     I have personally reviewed the examination and initial interpretation  and I agree with the findings.     STEPHEN ZELAYA MD               ASSESSMENT:     1.  Myeloproliferative syndrome/MDS.   2.  Status post non-myeloablative allo-sib peripheral blood stem cell transplant.   3.  History of chronic qphsn-xkapmr-hppt disease.   4.  History of Hodgkin's disease.   5.  History of cardiac arrhythmias, SVT and V tach.   6.  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   7.  History of cirrhosis.   8.  History of pulmonary emboli.   9.  History of elevated hemoglobin A1c.   10.  Thyroid nodule.    11.  Cholelithiasis.     12.  Diverticulosis.   13.  Anticoagulation.   14.  Status post bilateral knee replacement.     15.  Aortic stenosis      Haresh is doing well. No evidence of  Hodgkins remains in CR.Continues to have gallstones He does have  Some arrhythmias and aortic stenosis. Needs to see Dr Matt to review Zio patch. Continue warfarin. For DVT hx. I  will see him again in 6 months' time.

## 2017-11-21 NOTE — MR AVS SNAPSHOT
After Visit Summary   11/21/2017    Haresh Rock    MRN: [id number]           Patient Information     Date Of Birth          1950        Visit Information        Provider Department      11/21/2017 10:30 AM Augusto Miller MD [facility] Blood and Marrow Transplant            [facility] ([facility])  [address] [place] [address]  Phone: [phone]  Clinic Hours:   Monday-Thursday:7am to 7pm   Friday: 7am to 5pm   Weekends and holidays:    8am to noon (in general)  If your fever is 100.5  or greater,   call the clinic.  After hours call the   hospital at [phone] or   [phone]. Ask for the BMT   fellow on-call            Follow-ups after your visit        Follow-up notes from your care team     Return in about 6 months (around 5/21/2018).      Your next 10 appointments already scheduled     May 22, 2018 10:00 AM CDT   [facility] with  [facility]   [facility] ([facility])    [address] [place] [id number] [phone]            May 22, 2018 10:30 AM CDT   Return with Augusto Miller MD   [facility] Blood and Marrow Transplant ([facility])    [address] [place] [id number] [phone]              Who to contact     If you have questions or need follow up information about today's clinic visit or your schedule please contact [facility] BLOOD AND MARROW TRANSPLANT directly at [phone].  Normal or non-critical lab and imaging results will be communicated to you by MyChart, letter or phone within 4 business days after the clinic has received the results. If you do not hear from us within 7 days, please contact the clinic through MyChart or phone. If you have a critical or abnormal lab result, we will notify you by phone as soon as possible.  Submit refill requests through [employer] or call your pharmacy and they will  forward the refill request to us. Please allow 3 business days for your refill to be completed.          Additional Information About Your Visit        [employer] Information     [email or web address] gives you secure access to your electronic health record. If you see a primary care provider, you can also send messages to your care team and make appointments. If you have questions, please call your primary care clinic.  If you do not have a primary care provider, please call [phone] and they will assist you.        Care EveryWhere ID     This is your Care EveryWhere ID. This could be used by other organizations to access your [place] medical records  [id number]        Your Vitals Were     Pulse Temperature Respirations Pulse Oximetry BMI (Body Mass Index)       63 96.7  F (35.9  C) (Oral) 18 98% 28.16 kg/m2        Blood Pressure from Last 3 Encounters:   11/21/17 138/82   10/16/17 123/77   05/30/17 120/71    Weight from Last 3 Encounters:   11/21/17 94.2 kg (207 lb 9.6 oz)   10/16/17 94.7 kg (208 lb 11.2 oz)   05/30/17 94.2 kg (207 lb 11.2 oz)              Today, you had the following     No orders found for display         Today's Medication Changes          These changes are accurate as of: 11/21/17 10:56 AM.  If you have any questions, ask your nurse or doctor.               These medicines have changed or have updated prescriptions.        Dose/Directions    amoxicillin 875 MG tablet   Commonly known as:  AMOXIL   This may have changed:    - additional instructions  - Another medication with the same name was removed. Continue taking this medication, and follow the directions you see here.   Used for:  Acute sinusitis with symptoms > 10 days   Changed by:  Jose Manuel Gallegos MD        Dose:  875 mg   Take 1 tablet (875 mg) by mouth 2 times daily   Quantity:  20 tablet   Refills:  0                Recent Review Flowsheet Data     BMT Recent Results Latest Ref Rng & Units 5/23/2017 5/30/2017 7/18/2017 8/29/2017  10/10/2017 11/17/2017 11/17/2017    WBC 4.0 - 11.0 10e9/L - 7.9 - - - - 7.6    Hemoglobin 13.3 - 17.7 g/dL - 16.7 - - - - 15.9    Platelet Count 150 - 450 10e9/L - 117(L) - - - - 117(L)    Neutrophils (Absolute) 1.6 - 8.3 10e9/L - 4.5 - - - - 4.1    INR 0.86 - 1.14 2.2(A) 2.38(H) 2.5(A) 3.3(A) 2.6(A) - 3.10(H)    Sodium 133 - 144 mmol/L - 140 - - - - 133    Potassium 3.4 - 5.3 mmol/L - 4.7 - - - - 4.1    Chloride 94 - 109 mmol/L - 109 - - - - 101    Glucose 70 - 99 mg/dL - 140(H) - - - - 137(H)    Urea Nitrogen 7 - 30 mg/dL - 24 - - - - 26    Creatinine 0.66 - 1.25 mg/dL - 0.99 - - - 1.1 1.10    Calcium (Total) 8.5 - 10.1 mg/dL - 8.1(L) - - - - 7.8(L)    Protein (Total) 6.8 - 8.8 g/dL - 7.1 - - - - 6.8    Albumin 3.4 - 5.0 g/dL - 3.6 - - - - 3.4    Alkaline Phosphatase 40 - 150 U/L - 95 - - - - 97    AST 0 - 45 U/L - 28 - - - - 20    ALT 0 - 70 U/L - 18 - - - - 17    MCV 78 - 100 fl - 93 - - - - 93               Primary Care Provider Office Phone # Fax #    Anya Nowak [phone] [facility] [place] [address]        Equal Access to Services     [facility] : Luisa ivllalta Somarti, waaxda luqadaha, qaybta kaalmamarko junior, priya cunningham. [facility] [phone].    ATENCIÓN: Si habla tino, tiene a barger disposición servicios gratuitos de asistencia lingüística. Llame al [phone].    We comply with applicable federal civil rights laws and [place] laws. We do not discriminate on the basis of race, color, national origin, age, disability, sex, sexual orientation, or gender identity.            Thank you!     Thank you for choosing [facility] BLOOD AND MARROW TRANSPLANT  for your care. Our goal is always to provide you with excellent care. Hearing back from our patients is one way we can continue to improve our services. Please take a few minutes to complete the written survey that you may receive in the mail after your visit with us. Thank  you!             Your Updated Medication List - Protect others around you: Learn how to safely use, store and throw away your medicines at www.disposemymeds.org.          This list is accurate as of: 11/21/17 10:56 AM.  Always use your most recent med list.                   Brand Name Dispense Instructions for use Diagnosis    amoxicillin 875 MG tablet    AMOXIL    20 tablet    Take 1 tablet (875 mg) by mouth 2 times daily    Acute sinusitis with symptoms > 10 days       ARTIFICIAL TEAR SOLUTION OP      Apply  to eye. USE AS DIRECTED(DF)        CENTRUM PO      1 TABLET DAILY        fluticasone 50 MCG/ACT spray    FLONASE    16 mL    SPRAY 1-2 SPRAYS INTO BOTH NOSTRILS DAILY NEEDS TO BE SEEN FOR FURTHER REFILLS    Chronic rhinitis       folic acid 1 MG tablet    FOLVITE    90 tablet    Take 1 tablet (1,000 mcg) by mouth daily    Knorz-qdiczz-peme disease, chronic (H), Lymphoma (H)       metoprolol 25 MG 24 hr tablet    TOPROL-XL    90 tablet    Take 1 tablet (25 mg) by mouth daily    Paroxysmal atrial fibrillation (H)       NYQUIL PO      Take by mouth as needed        pantoprazole 20 MG EC tablet    PROTONIX    90 tablet    Take 1 tablet (20 mg) by mouth daily    Ydevr-nbizer-sixn disease, chronic (H), Hodgkin's disease of extranodal or solid organ site (H)       PREVIDENT 5000 DRY MOUTH 1.1 % Gel topical gel   Generic drug:  sodium fluoride dental gel      Take by mouth daily    Hodgkin's disease of extranodal or solid organ site (H), Status post bone marrow transplant (H), Hereditary hemochromatosis (H), Thyroid nodule       simvastatin 10 MG tablet    ZOCOR    90 tablet    TAKE 1 TABLET (10 MG) BY MOUTH AT BEDTIME    Hyperlipidemia with target LDL less than 100       sotalol 80 MG tablet    BETAPACE    90 tablet    Take 0.5 tablets (40 mg) by mouth 2 times daily Follow up visit needed with Dr Matt    Atrial fibrillation (H)       warfarin 5 MG tablet    COUMADIN    60 tablet    5 mg on Tue, Thu; 2.5 mg all other  days or as directed by coumadin clinic    Atrial fibrillation, unspecified type (H), Antiphospholipid antibody syndrome (H)

## 2017-11-21 NOTE — LETTER
11/21/2017       RE: Haresh Rock  [address] [place] [address]     Dear Colleague,    Thank you for referring your patient, Haresh Rock, to the [facility] BLOOD AND MARROW TRANSPLANT at [facility]. Please see a copy of my visit note below.    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 Haresh has had some tachycardia and palpitations on occasion.He had Zio patch showing max  some v Tach and SVT primarily. No chest pain or SOB. He had a recent Holter monitoring. He has no other specific symptoms.  He denies fever, chills, nausea or vomiting.  He denies any night sweats.  He denies any bleeding issues.  He remains on warfarin.      PAST MEDICAL HISTORY:  See my note from 05/2017    SOCIAL HISTORY:  See my note from 05/2017     FAMILY HISTORY:  See my note from  05/2017     REVIEW OF SYSTEMS:  A 12-point review of systems is done and is negative, except as in the HPI.      PHYSICAL EXAMINATION:   GENERAL:  He is an alert gentleman in no acute distress.   VITAL SIGNS:  Stable. /82 (BP Location: Right arm, Patient Position: Chair, Cuff Size: Adult Regular)  Pulse 63  Temp 96.7  F (35.9  C) (Oral)  Resp 18  Wt 94.2 kg (207 lb 9.6 oz)  SpO2 98%  BMI 28.16 kg/m2    HEENT:  Normocephalic.  EOM okay, no scleral icterus.  Mouth without lesion.   RESPIRATORY:  Clear to percussion and auscultation.   Neck:  There is some asymmetry to his thyroid.  I feel the right lobe a little more prominently than the left.   LYMPH:  I cannot feel any cervical,  supraclavicular, submandibular, axillary or inguinal nodes.   CARDIAC: Today he is in Normal sinus rhythm.  No S3, S4  I hear  1 -2/6 SAYDA at RSB at  2ndR[facility].   ABDOMEN:  Soft without hepatosplenomegaly.   EXTREMITIES:  Without edema.   Results for PAMELA GRANADOS (MRN [id number]) as of 11/21/2017 10:27   Ref. Range 11/17/2017 09:57 11/17/2017 10:07 11/17/2017 10:29 11/21/2017 08:23 11/21/2017 09:32   Sodium Latest Ref Range: 133 - 144 mmol/L   133     Potassium Latest Ref Range: 3.4 - 5.3 mmol/L   4.1     Chloride Latest Ref Range: 94 - 109 mmol/L   101     Carbon Dioxide Latest Ref Range: 20 - 32 mmol/L   25     Urea Nitrogen Latest Ref Range: 7 - 30 mg/dL   26     Creatinine Latest Ref Range: 0.66 - 1.25 mg/dL 1.1  1.10     GFR Estimate Latest Ref Range: >60 mL/min/1.7m2 66  66     GFR Estimate If Black Latest Ref Range: >60 mL/min/1.7m2 80  80     Calcium Latest Ref Range: 8.5 - 10.1 mg/dL   7.8 (L)     Anion Gap Latest Ref Range: 3 - 14 mmol/L   6     Albumin Latest Ref Range: 3.4 - 5.0 g/dL   3.4     Protein Total Latest Ref Range: 6.8 - 8.8 g/dL   6.8     Bilirubin Total Latest Ref Range: 0.2 - 1.3 mg/dL   0.9     Alkaline Phosphatase Latest Ref Range: 40 - 150 U/L   97     ALT Latest Ref Range: 0 - 70 U/L   17     AST Latest Ref Range: 0 - 45 U/L   20     CRP Inflammation Latest Ref Range: 0.0 - 8.0 mg/L   5.6     Lactate Dehydrogenase Latest Ref Range: 85 - 227 U/L   189     TSH Latest Ref Range: 0.40 - 4.00 mU/L   2.53     Glucose Latest Ref Range: 70 - 99 mg/dL   137 (H)     WBC Latest Ref Range: 4.0 - 11.0 10e9/L   7.6     Hemoglobin Latest Ref Range: 13.3 - 17.7 g/dL   15.9     Hematocrit Latest Ref Range: 40.0 - 53.0 %   44.6     Platelet Count Latest Ref Range: 150 - 450 10e9/L   117 (L)     RBC Count Latest Ref Range: 4.4 - 5.9 10e12/L   4.79     MCV Latest Ref Range: 78 - 100 fl   93     MCH Latest Ref Range: 26.5 - 33.0 pg   33.2 (H)     MCHC Latest Ref Range: 31.5 - 36.5 g/dL   35.7     RDW Latest  Ref Range: 10.0 - 15.0 %   11.9     Diff Method Unknown   Automated Method     % Neutrophils Latest Units: %   54.5     % Lymphocytes Latest Units: %   31.5     % Monocytes Latest Units: %   11.1     % Eosinophils Latest Units: %   1.9     % Basophils Latest Units: %   0.7     % Immature Granulocytes Latest Units: %   0.3     Nucleated RBCs Latest Ref Range: 0 /100   0     Absolute Neutrophil Latest Ref Range: 1.6 - 8.3 10e9/L   4.1     Absolute Lymphocytes Latest Ref Range: 0.8 - 5.3 10e9/L   2.4     Absolute Monocytes Latest Ref Range: 0.0 - 1.3 10e9/L   0.8     Absolute Eosinophils Latest Ref Range: 0.0 - 0.7 10e9/L   0.1     Absolute Basophils Latest Ref Range: 0.0 - 0.2 10e9/L   0.1     Abs Immature Granulocytes Latest Ref Range: 0 - 0.4 10e9/L   0.0     Absolute Nucleated RBC Unknown   0.0     % Retic Latest Ref Range: 0.5 - 2.0 %   1.8     Absolute Retic Latest Ref Range: 25 - 95 10e9/L   85.3     INR Latest Ref Range: 0.86 - 1.14    3.10 (H)     CT CHEST/ABDOMEN/PELVIS W CONTRAST Unknown    Rpt    CT SOFT TISSUE NECK W CONTRAST Unknown  Rpt      ECHO COMPLETE WITH DEFINITY Unknown     Rpt     TECHNIQUE:  Helical CT images from the thoracic inlet through the  symphysis pubis were obtained  with contrast. Contrast dose:  Isovue-370  127cc     COMPARISON: CT CAP 11/26/2015, 2/23/2015 ultrasound-guided thyroid FNA  4/25/2016     HISTORY: Hodgkin's lymphoma status post bone marrow transplant     FINDINGS:     Chest:     Heart size is normal. No pericardial thickening or effusion.  Atherosclerotic calcifications of the aortic valve and aorta/great  vessels without evidence of aneurysmal dilatation. Stable 4 mm right  thyroid nodule with surrounding ill-defined mixed attenuation  unchanged from prior exams with interval biopsy. No central pulmonary  embolism. No pathologic-appearing mediastinal or hilar lymph nodes.  Thoracic esophagus is unremarkable.     No pleural effusion or pneumothorax. The central  tracheobronchial tree  is patent. No focal airspace disease. Trace right dependent  atelectasis. Scattered punctate calcified and noncalcified nodules  which are grossly unchanged since 11/16/2015.     Abdomen and pelvis:      No suspicious liver lesion. Unchanged left lobe atrophy. Portal vein  is patent. Layering calculi in the gallbladder. No gallbladder wall  thickening or pericholecystic fluid. No intra-or extrahepatic biliary  dilatation.      Unchanged atrophy of the pancreas. Unchanged 1 cm pancreatic tail cyst  (series 3 image 284) and neck cyst (series 4, image 63). Normal spleen  and adrenal glands with medial splenule.      Symmetric kidneys without hydronephrosis or nephrolithiasis. Bilateral  renal cortical cysts. Urinary bladder is decompressed but  unremarkable. Mild prostatomegaly. Symmetric seminal vesicles.     Scattered colonic diverticula without adjacent stranding to suggest  diverticulitis. Appendix is normal. Small and large bowel are normal  in caliber.     Unchanged para-aortic lymph nodes. No new or enlarging lymph nodes.     Scattered atherosclerotic calcifications of the abdominal aorta. No  evidence of aneurysmal dilatation.      No free fluid or free air.     Bones and soft tissues: Degenerative changes of the thoracolumbar  spine most prominent at L5-S1 with moderate bilateral neuroforaminal  narrowing. No acute or aggressive appearing osseous lesions.  Pectoralis major intramuscular lipoma on the right. Stable  gynecomastia.          IMPRESSION:   1. Stable para-aortic lymph nodes. No evidence of disease progression.  2. Cholelithiasis and diverticulosis.  3. Stable pancreatic cysts.     ______________________________________________________________________  ____________________  Based on International Consensus Guidelines     Asymptomatic patient without high risk stigmata of malignancy  (obstructive jaundice with cystic lesion in head of the pancreas,  enhancing solid component  within cyst, or main pancreatic duct greater  than 10 mm), and without worrisome features (cyst greater than 3 cm,  thickened/enhancing cyst walls, main pancreatic duct 5-9 mm, mural  nodule, or abrupt changing caliber of pancreatic duct with distal  pancreatic atrophy).     Size of largest cyst:  Less than 1 cm: CT or MRI with contrast in 2-3 years.  1-2 cm: CT or MRI with contrast yearly for 2 years, then lengthen  interval if no change.  2-3 cm: EUS in 3-6 months, then can alternate MRI with EUS as  appropriate.  Greater than 3 cm: Close surveillance alternating MRI with US every  3-6 months. Consider surgery in young, fit patients.     Consider gastroenterology consultation for all categories of  pancreatic cysts.     *Reference: International Consensus Guidelines for Management of IPMN  and MCN of the pancreas. Pancreatology: 12:(2012); 183-197.           I have personally reviewed the examination and initial interpretation  and I agree with the findings.     TRACIE CHOWDHURYMD  CT SOFT TISSUE NECK W CONTRAST 11/17/2017 10:07 AM     History:  Hodgkins neck nodes; Hodgkin's disease of extranodal or  solid organ site (H); Status post bone marrow transplant (H);  Hereditary hemochromatosis (H); Thyroid nodule      Comparison:  Neck CT 5/16/2016      Technique: Following intravenous administration of nonionic iodinated  contrast medium, thin section helical CT images were obtained from the  skull base down to the level of the aortic arch.  Axial, coronal and  sagittal reformations were performed with 2-3 mm slice thickness  reconstruction. Images were reviewed in soft tissue, lung and bone  windows.     Contrast: Isovue-370  100cc     Findings:   Evaluation of the mucosal space demonstrates no evident abnormality in  the nasopharynx, oropharynx, hypopharynx or the glottis. The tongue  base appears normal. The major salivary glands appear unremarkable.     There is no evident cervical lymphadenopathy. Few bilateral  prominent  subcentimeter sized lymph nodes in the supraclavicular area again  demonstrated. Compared to neck CT from 5/16/2016, these appear  relatively less prominent and less in number. No new lymphadenopathy.  The fascial spaces in the neck are intact bilaterally. The major  vascular structures in the neck appear unremarkable. Hypoattenuating  subcentimeter right thyroid nodule, unchanged.     Stable multilevel degenerative changes of the cervical spine, most  pronounced at C4-5, C5-6, and C6-7. Moderate associated spinal canal  narrowing at C5-6 and C6-7, unchanged compared to prior. Multilevel  neuroforaminal stenosis. Previously noted mucosal thickening in the  maxillary sinus are completely resolved. Clear paranasal sinuses.  Mastoid air cells are clear.     Visualized apical lungs are unremarkable. Mild scarring in the right  lung apex again demonstrated.         Impression:   1. No evidence of recurrent lymphoma in the neck.  2. Complete resolution of previously noted paranasal sinus  opacifications.     I have personally reviewed the examination and initial interpretation  and I agree with the findings.     STEPHEN ZELAYA MD               ASSESSMENT:     1.  Myeloproliferative syndrome/MDS.   2.  Status post non-myeloablative allo-sib peripheral blood stem cell transplant.   3.  History of chronic wvoyn-hkpnoc-vutt disease.   4.  History of Hodgkin's disease.   5.  History of cardiac arrhythmias, SVT and V tach.   6.  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   7.  History of cirrhosis.   8.  History of pulmonary emboli.   9.  History of elevated hemoglobin A1c.   10.  Thyroid nodule.    11.  Cholelithiasis.     12.  Diverticulosis.   13.  Anticoagulation.   14.  Status post bilateral knee replacement.     15.  Aortic stenosis      Haresh is doing well. No evidence of  Hodgkins remains in CR.Continues to have gallstones He does have  Some arrhythmias and aortic  stenosis. Needs to see Dr Matt to review Zio patch. Continue warfarin. For DVT hx. I  will see him again in 6 months' time.         Again, thank you for allowing me to participate in the care of your patient.      Sincerely,    Augusto Miller MD

## 2017-11-27 ENCOUNTER — CARE COORDINATION (OUTPATIENT)
Dept: CARDIOLOGY | Facility: CLINIC | Age: 67
End: 2017-11-27

## 2017-11-27 ENCOUNTER — PRE VISIT (OUTPATIENT)
Dept: CARDIOLOGY | Facility: CLINIC | Age: 67
End: 2017-11-27

## 2017-11-27 DIAGNOSIS — I47.10 PAROXYSMAL SUPRAVENTRICULAR TACHYCARDIA (H): Primary | ICD-10-CM

## 2017-11-27 NOTE — PROGRESS NOTES
Reviewed patient's zio patch. Called patient to cancel patient's NP appointment on 11/27/17 and will schedule with Dr. Matt on 12/4/2017 at 9:20.   DEJAH Fatima, CNP

## 2017-12-01 ASSESSMENT — ENCOUNTER SYMPTOMS
SLEEP DISTURBANCES DUE TO BREATHING: 0
HYPERTENSION: 0
PALPITATIONS: 1
SYNCOPE: 0
HYPOTENSION: 0
LIGHT-HEADEDNESS: 0
EXERCISE INTOLERANCE: 0
LEG PAIN: 0
ORTHOPNEA: 0

## 2017-12-01 NOTE — PROGRESS NOTES
ANTICOAGULATION FOLLOW-UP CLINIC VISIT    Patient Name:  Haresh Rock  Date:  12/1/2017  Contact Type:  Telephone    SUBJECTIVE:     Patient Findings     Positives No Problem Findings           OBJECTIVE    INR   Date Value Ref Range Status   11/17/2017 3.10 (H) 0.86 - 1.14 Final     Factor 2 Assay   Date Value Ref Range Status   05/01/2007 58 (L) 60 - 140 % Final     Comment:     (Note)  CALLED TO TAMARA(INTERV. RADIOLOGY)CG2024,        ASSESSMENT / PLAN  No question data found.  Anticoagulation Summary as of 11/17/2017     INR goal 2.0-3.0   Today's INR 3.10!   Maintenance plan 5 mg (5 mg x 1) on Tue, Thu; 2.5 mg (5 mg x 0.5) all other days   Full instructions 5 mg on Tue, Thu; 2.5 mg all other days   Weekly total 22.5 mg   No change documented Laverne Ferguson RN   Plan last modified Belinda Francis RN (4/19/2016)   Next INR check 12/29/2017   Priority INR   Target end date Indefinite    Indications   Long-term (current) use of anticoagulants [Z79.01] [Z79.01]  Atrial fibrillation (H) [I48.91]         Anticoagulation Episode Summary     INR check location     Preferred lab     Send INR reminders to [facility]    Comments       Anticoagulation Care Providers     Provider Role Specialty Phone number    Anya Nowak MD [facility] [phone]            See the Encounter Report to view Anticoagulation Flowsheet and Dosing Calendar (Go to Encounters tab in chart review, and find the Anticoagulation Therapy Visit)    Dosage adjustment made based on physician directed care plan.    Laverne Ferguson RN

## 2017-12-04 ENCOUNTER — OFFICE VISIT (OUTPATIENT)
Dept: CARDIOLOGY | Facility: CLINIC | Age: 67
End: 2017-12-04
Attending: INTERNAL MEDICINE
Payer: COMMERCIAL

## 2017-12-04 VITALS
HEIGHT: 72 IN | HEART RATE: 73 BPM | BODY MASS INDEX: 28.17 KG/M2 | SYSTOLIC BLOOD PRESSURE: 136 MMHG | WEIGHT: 208 LBS | DIASTOLIC BLOOD PRESSURE: 83 MMHG | OXYGEN SATURATION: 99 %

## 2017-12-04 DIAGNOSIS — I48.0 PAROXYSMAL ATRIAL FIBRILLATION (H): ICD-10-CM

## 2017-12-04 DIAGNOSIS — I47.10 PAROXYSMAL SUPRAVENTRICULAR TACHYCARDIA (H): Primary | ICD-10-CM

## 2017-12-04 PROCEDURE — 99215 OFFICE O/P EST HI 40 MIN: CPT | Mod: ZP | Performed by: INTERNAL MEDICINE

## 2017-12-04 PROCEDURE — 93010 ELECTROCARDIOGRAM REPORT: CPT | Mod: ZP | Performed by: INTERNAL MEDICINE

## 2017-12-04 PROCEDURE — 99212 OFFICE O/P EST SF 10 MIN: CPT | Mod: 25,ZF

## 2017-12-04 PROCEDURE — 93005 ELECTROCARDIOGRAM TRACING: CPT | Mod: ZF

## 2017-12-04 RX ORDER — LIDOCAINE 40 MG/G
CREAM TOPICAL
Status: CANCELLED | OUTPATIENT
Start: 2017-12-04

## 2017-12-04 ASSESSMENT — PAIN SCALES - GENERAL: PAINLEVEL: NO PAIN (0)

## 2017-12-04 NOTE — PROGRESS NOTES
HPI: Purpose of visit: patient comes back for follow-up of atrial tachycardia.    Mr. Haresh Rock is a very pleasant 67-year-old gentleman who is status post circumferential pulmonary vein isolation for paroxysmal atrial fibrillation in 03/2013.  His past medical history also includes renal HTN, s/p bone marrow transplant, hemochromatosis, Hodgkin's lymphoma, CKD stage 2, DVT, DMII.      Most recently, patient has been having episodes of high heart rates. Patient was seen by the nurse practitioner and a Zio patch was ordered on October 16, 2017. Metoprolol XL 50 mg once daily was started at the same time. Patient is also taking sotalol 40 mg twice daily. The Zio patch showed multiple episodes of atrial tachycardia after October 23 and October 24, 2017. The longest episode lasted for more than 2 hours with a heart rate of 110 bpm. Patient also wears Apple Watch and reports multiple tachycardia episodes of 110 to 130 bpm in the last few weeks.    Patient has some symptoms of palpitations but denies exertional angina, exertional dyspnea, frequent lightheadedness, presyncope or syncope.    PAST MEDICAL HISTORY:  Past Medical History:   Diagnosis Date     Antiphospholipid antibody syndrome (H) 2005    Ravi's     Atrial fibrillation (H) 4/2011     Cirrhosis (H)      CKD (chronic kidney disease) stage 1, GFR 90 ml/min or greater      CKD (chronic kidney disease) stage 2, GFR 60-89 ml/min      Diabetes mellitus type II     steroid induced     DJD (degenerative joint disease) of knee     SHAWN, worse on right     DVT (deep venous thrombosis) (H)      ED (erectile dysfunction)      Gallbladder polyp 5/2010     Vstsp-Bwsbdg-Ubyt Disease 2007    BMT     Hemochromatosis      Hodgkin's disease NOS     5 cycles of ABVD in 2011     HTN (hypertension)      Hyperlipidaemia LDL goal <100      Myeloproliferative disorder (H)     atypical, [facility]     PE (pulmonary embolism) 11/2004     Polyneuropathy      Primary pulmonary hypertension  (H)        CURRENT MEDICATIONS:  Current Outpatient Prescriptions   Medication Sig Dispense Refill     sotalol (BETAPACE) 80 MG tablet Take 0.5 tablets (40 mg) by mouth 2 times daily Follow up visit needed with Dr Matt 90 tablet 1     PREVIDENT 5000 DRY MOUTH 1.1 % GEL topical gel Take by mouth daily  3     folic acid (FOLVITE) 1 MG tablet Take 1 tablet (1,000 mcg) by mouth daily 90 tablet 3     pantoprazole (PROTONIX) 20 MG EC tablet Take 1 tablet (20 mg) by mouth daily 90 tablet 3     warfarin (COUMADIN) 5 MG tablet 5 mg on Tue, Thu; 2.5 mg all other days or as directed by coumadin clinic 60 tablet 5     simvastatin (ZOCOR) 10 MG tablet TAKE 1 TABLET (10 MG) BY MOUTH AT BEDTIME 90 tablet 3     Pseudoeph-Doxylamine-DM-APAP (NYQUIL PO) Take by mouth as needed       amoxicillin (AMOXIL) 875 MG tablet Take 1 tablet (875 mg) by mouth 2 times daily (Patient taking differently: Take 875 mg by mouth 2 times daily Prior to dental appts) 20 tablet 0     ARTIFICIAL TEAR SOLUTION OP Apply  to eye. USE AS DIRECTED(DF)       CENTRUM OR 1 TABLET DAILY         PAST SURGICAL HISTORY:  Past Surgical History:   Procedure Laterality Date     ANESTHESIA CARDIOVERSION  4/21/2011    Procedure:ANESTHESIA CARDIOVERSION; Surgeon:GENERIC ANESTHESIA PROVIDER; Location:[facility] OR     ARTHROSCOPY KNEE RT/LT      LT     COLONOSCOPY  10/16/2012    Procedure: COLONOSCOPY;  Colonoscopy, screening;  Surgeon: Reg Feliciano MD;  Location: [facility] OR     H ABLATION FOCAL AFIB  3/5/2013    PVI     HC BONE MARROW/STEM TRANSPLANT ALLOGENIC  5/8/2007     INSERT PORT VASCULAR ACCESS       JOINT REPLACEMTN, KNEE RT/LT  3/28/12    SHAWN     VASECTOMY  1990       ALLERGIES:     Allergies   Allergen Reactions     No Known Drug Allergy        FAMILY HISTORY:  - Premature coronary artery disease  - Atrial fibrillation  - Sudden cardiac death     SOCIAL HISTORY:  Social History   Substance Use Topics     Smoking status: Never Smoker     Smokeless tobacco: Never Used      Alcohol use No       ROS:   Constitutional: No fever, chills, or sweats. Weight stable.   ENT: No visual disturbance, ear ache, epistaxis, sore throat.   Cardiovascular: As per HPI.   Respiratory: No cough, hemoptysis.    GI: No nausea, vomiting, hematemesis, melena, or hematochezia.   : No hematuria.   Integument: Negative.   Psychiatric: Negative.   Hematologic:  Easy bruising, no easy bleeding.  Neuro: Negative.   Endocrinology: No significant heat or cold intolerance   Musculoskeletal: No myalgia.    Exam:  /83 (BP Location: Left arm, Patient Position: Chair, Cuff Size: Adult Regular)  Pulse 73  Ht 1.829 m (6')  Wt 94.3 kg (208 lb)  SpO2 99%  BMI 28.21 kg/m2  GENERAL APPEARANCE: healthy, alert and no distress  HEENT: no icterus, no xanthelasmas, normal pupil size and reaction, normal palate, mucosa moist, no central cyanosis  NECK: no adenopathy, no asymmetry, masses, or scars, thyroid normal to palpation and no bruits, JVP not elevated  RESPIRATORY: lungs clear to auscultation - no rales, rhonchi or wheezes, no use of accessory muscles, no retractions, respirations are unlabored, normal respiratory rate  CARDIOVASCULAR: regular rhythm, normal S1 with physiologic split S2, no S3 or S4 and no murmur, click or rub, precordium quiet with normal PMI.  ABDOMEN: soft, non tender, without hepatosplenomegaly, no masses palpable, bowel sounds normal, aorta not enlarged by palpation, no abdominal bruits  EXTREMITIES: peripheral pulses normal, no edema, no bruits  NEURO: alert and oriented to person/place/time, normal speech, gait and affect  VASC: Radial, femoral, dorsalis pedis and posterior tibialis pulses are normal in volumes and symmetric bilaterally. No bruits are heard.  SKIN: no ecchymoses, no rashes    Labs:  CBC RESULTS:   Lab Results   Component Value Date    WBC 7.6 11/17/2017    RBC 4.79 11/17/2017    HGB 15.9 11/17/2017    HCT 44.6 11/17/2017    MCV 93 11/17/2017    MCH 33.2 (H) 11/17/2017     MCHC 35.7 11/17/2017    RDW 11.9 11/17/2017     (L) 11/17/2017       BMP RESULTS:  Lab Results   Component Value Date     11/17/2017    POTASSIUM 4.1 11/17/2017    CHLORIDE 101 11/17/2017    CO2 25 11/17/2017    ANIONGAP 6 11/17/2017     (H) 11/17/2017    BUN 26 11/17/2017    CR 1.10 11/17/2017    GFRESTIMATED 66 11/17/2017    GFRESTBLACK 80 11/17/2017    GILBERT 7.8 (L) 11/17/2017        INR RESULTS:  Lab Results   Component Value Date    INR 3.10 (H) 11/17/2017    INR 2.6 (A) 10/10/2017    INR 3.3 (A) 08/29/2017    INR 2.5 (A) 07/18/2017    INR 2.38 (H) 05/30/2017    INR 2.2 (A) 05/23/2017    INR 2.15 (H) 11/29/2016    INR 2.98 (H) 04/18/2016       Procedures:      Assessment and Plan:     Paroxysmal atrial tachycardia    I discussed extensively with patient the aims, risks, alternatives to catheter ablation for atrial tachycardia.    I explained to patient that risks of EP study and ablation procedure include, but are not limited to vascular injury, excessive bleeding requiring blood transfusion, aortic injury, cardiac tamponade requiring pericardiocentesis or open heart surgery, TIA, stroke, esophageal injury, pulmonary vein stenosis or death. Patient understood the risks and decided to proceed with procedure.    Patient was stopped the metoprolol today and the sotalol 5 days prior to the ablation procedure. Patient will continue on warfarin through the ablation. A day prior to the ablation patient will undergo a SEAN and CT scan of the heart. The procedure will be performed under general anesthesia and guided by CARTO 3 mapping.    All questions and concerns were addressed and patient is happy with plan.                CC  Patient Care Team:  Anya Nowak MD as PCP - General (Family Practice)  Elsie Thibodeaux MSW as   Augusto Miller MD as Referring Physician (Internal Medicine)  Eber Griffith MD as MD (Cardiology)  Jesusita Mejia PA-C as Physician  Assistant  Jose Manuel Catalan MD as MD (Dermatology)  Pino Sharma MD as MD (Internal Medicine)  Fabi Jimenez MD as MD (INTERNAL MEDICINE - ENDOCRINOLOGY, DIABETES & METABOLISM)  Tina Mejia RN as Nurse Coordinator (Clinical Cardiac Electrophysiology)  Bekah Matt MD as MD (Cardiology)  Bonnie Ford APRN CNP as Nurse Practitioner (Nurse Practitioner - Family)  ESTABLISHED PATIENT

## 2017-12-04 NOTE — MR AVS SNAPSHOT
After Visit Summary   12/4/2017    Haresh Rock    MRN: [id number]           Patient Information     Date Of Birth          1950        Visit Information        Provider Department      12/4/2017 9:20 AM Bekah Matt MD [facility]        Today's Diagnoses     Paroxysmal supraventricular tachycardia (H)    -  1    Paroxysmal atrial fibrillation (H)          Care Instructions    Medication Changes:   1. Stop metoprolol    You are scheduled for an Atrial Tachycardia Ablation, at [facility], [place], with Dr. Matt. The hospital is located at [address] [place] [address] [place].       Date:__December 27, 2017____ Time:_10am_ (Call may come 30 minutes prior or 30 minutes after)  Pre-Anesthesia Phone Call  You do not need to come to the [facility].      Date: January 3, 2018  INR Check, SEAN (transesophageal echocardiogram), CT Angiogram  _9:45am__: Arrive to the [facility] Waiting Room for your: SEAN with INR check.  Directly following__: [facility] at the [facility] for your: CT      -If your INR is too high or too low, your procedure may be canceled, or you may be required to stay overnight in the hospital.     -If there is evidence of a clot in your heart on the SEAN,  the procedure will be canceled.     SEAN Instructions:  1. Nothing to eat or drink 6 hours prior.  2. No additional medication instructions.  3. You will receive sedation for the SEAN so you will need someone to drive you to and from the hospital and stay with you for 6 hours. Do not make any legal decisions for 24 hours. You may go home after this test.     CT Angiogram Instructions:  1. Nothing to eat or drink except water 3 hours prior.  2. Avoid caffeine, smoking, or strenuous activity on the day of the test.  3. You will receive contrast, so plan to drink extra water the day before and after your test.  4. If you take any of the following medications, please  HOLD the day of:   * NSAIDS (Examples: ibuprofen/Advil/Motrin, naproxen/Aleve) the day of.   * Erectile dysfunction medications (Examples: Viagra, Cialis, Levitra).  5. Please allow 1 hour of time for preparation and testing. You are on the table for ~15 minutes. Your feet go in first, your head is not enclosed.      Date: January 4, 2018   Time: _5:30 am___to Unit [facility] at the [facility]  Atrial Tachycardia Ablation Procedure    1. Please review the attached instructions on showering before your procedure at the end of this letter.  2. Your history and physical will be completed by our nurse practitioner when you arrive.  3. Please do not eat anything for 8 hours prior to your procedure. You may have sips of water up until 2 hours prior to your arrival.  4. The morning of your procedure, you may take your scheduled medications with a sip of water - continue your blood thinner (warfarin).  **STOP SOTALOL 5 DAYS PRIOR TO PROCEDURE DATE**  5. You will receive general anesthesia for this procedure.   6. You will stay in the hospital overnight, and will need a .      Date: _February 5, 2018__10am__[place]__ Follow up with EP Nurse Practitioner  Date: _to be mailed and worn March 9th_____ 14 day Ziopatch hookup  Date: _April 9, 2018_1:40pm_ Follow up with Dr. Matt.       Please do not hesitate to utilize [employer] or call us if you have any questions or concerns.    Tina Mejia, RN  Electrophysiology Nurse Coordinator  [phone]    GAUTAM Waller Procedure   [phone]          Showering Before Surgery   Your surgeon has asked you to take 2 showers before surgery.  Why is this important?  It is normal for bacteria (germs) to be on your skin. The skin protects us from these germs. When you have surgery, we cut the skin. Sometimes germs get into the cuts and cause infection (illness caused by germs). By following the instructions below and using special soap, you will lower the number of germs on your  skin. This decreases your chance of infection.  Special soap  Buy or get 8 ounces of antiseptic surgical soap called 4% CHG. Common name brands of this soap are Hibiclens and Exidine.   You can find it at your local pharmacy, clinic or retail store. If you have trouble, ask your pharmacist to help you find the right substitute.   A note about shaving:  Do not shave within 12 inches of your incision (surgical cut) area for at least 3 days before surgery. Shaving can make small cuts in the skin. This puts you at a higher risk of infection.  Items you will need for each shower:    1 newly washed towel    4 ounces of one of the above soaps  Follow these instructions:  The evening before surgery   1. Wash your hair and body with your regular shampoo and soap. Make sure you rinse the shampoo and soap from your hair and body.   2. Using clean hands, apply about 2 ounces of soap gently on your skin from the neck to your toes. Use on your groin area last. Do not use this soap on your face or head. If you get any soap in your eyes, ears or mouth, rinse right away.   3. Repeat step 2. It is very important to let the soap stay on your skin for at least 1 minute.   4. Rinse well and dry off using a clean towel.If you feel any tingling, itching or other irritation, rinse right away. It is normal to feel some coolness on the skin after using the antiseptic soap. Your skin may feel a bit dry after the shower, but do not use any lotions, creams or moisturizers. Do not use hair spray or other products in your hair.  5. Dress in freshly washed clothes or pajamas. Use fresh pillowcases and sheets on your bed.    The morning of surgery  1. Wash your hair and body with your regular shampoo and soap. Make sure you rinse the shampoo and soap from your hair and body.   2. Using clean hands, apply about 2 ounces of soap gently on your skin from the neck to your toes. Use on your groin area last. Do not use this soap on your face or head. If  you get any soap in your eyes, ears or mouth, rinse right away.   3. Repeat step 2. It is very important to let the soap stay on your skin for at least 1 minute.   4. Rinse well and dry off using a clean towel.If you feel any tingling, itching or other irritation, rinse right away. It is normal to feel some coolness on the skin after using the antiseptic soap. Your skin may feel a bit dry after the shower, but do not use any lotions, creams or moisturizers. Do not use hair spray or other products in your hair.  5. Dress in clean clothes.  If you have any questions about showering or an allergy to CHG soap, please call the Preadmissions Nursing Department at the hospital where you are having your surgery.  [facility]): [phone]  This phone number will be answered between the hours of 8:00 a.m. and 6:30 p.m. Monday through Friday.                    Follow-ups after your visit        Your next 10 appointments already scheduled     Dec 27, 2017 10:00 AM CST   (Arrive by 9:45 AM)   PAC PHONE RN ASSESSMENT with Uc Pac Rn   [facility] Preoperative Assessment Center ([facility])    [address]  4th [address]  [facility] [place] [address] [phone]           Note: this is not an onsite visit; there is no need to come to the facility.            Jan 03, 2018 10:30 AM CST   Ech Allan with [email or web address]   [facility],  [facility] ([facility] [place])    [address] [id number] [phone]           1.  Please bring or wear a comfortable two-piece outfit. 2.  Arrival time: -   [facility]:  arrive 75 minutes prior to examination time. -   [facility]:  arrive 90 minutes prior to examination time. -   [facility]:   arrive 15 minutes prior to examination time. 3.  Plan to have someone here to drive you home after the test. -   Someone should stay with you for 6 hours after your test. 4.   No food or drink: -   6 hours before the test 5.  If you take antacids or water pills (diuretics): Do not take them until after your test. You may take blood pressure medicine with a few sips of water. 6.  If you have diabetes: -   Morning slots preferred -   If you take insulin, call your diabetes care team. Ask if you should take a   dose the morning of your test. -   If you take diabetes medicine by mouth, don't take it on the morning of your test. Bring it with you to take after the test. (If you have questions, call your diabetes care team.) 7.  Bring a list of any medicines you are taking. 8.  Do not drive for 24 hours after the test. 9.   A responsible adult must stay with you for 24 hours after the test.  10.  For any questions that cannot be answered, please contact the ordering physician            Jan 03, 2018  1:00 PM CST   (Arrive by 12:45 PM)   CT HEART ANGIO with UUCT4   [facility] [place] ([facility])    [address] [address] [phone]           Please allow two hours for this test.  Follow the instructions below:   The day before your exam, drink extra fluids at least six 8-ounce glasses (unless your doctor wants you to restrict your fluids).   No caffeine and no smoking the day of the test.   Do not eat or drink for 3 hours before your exam. You may take your morning medicines with small sips of water.   You may have or need a blood test (creatinine test) within 30 days of your exam. Go to your clinic or Diagnostic Imaging Department for this test.   If you take Viagra, Levitra or Cialis, stop taking it for 48 hours before your test.   If you are diabetic and take oral hypoglycemics, do not take them on the day of your test. Also, wait 48 hours before re-starting metformin (Avandamet, Glucophage, Glucovance, Metaglip).   Do not take diuretics on day of the test. This includes Furosemide (Lasix), Torsemide, Bumetanide  (Bumex), Metolazone (Zaroxolyn) and Hydrochlorothiazide.   Do not take NSAIDS on the day of the test. This includes ibuprofen (Advil or Motrin), Naproxen and Indomethacin.  Bring any scans or X-rays taken at other hospitals, if similar tests were done. Also bring a list of your medicines, including vitamins, minerals and over-the-counter drugs. It is safest to leave personal items at home.  Be sure to tell your doctor:   If you have any allergies, including any reaction to contrast.   If there s any chance you are pregnant.   If you are breastfeeding.   If you have any special needs.  Please wear loose clothing, such as a sweat suit or jogging clothes. Avoid snaps, zippers and other metal. We may ask you to undress and put on a hospital gown.  If you have any questions, please call the Imaging Department where you will have your exam.            Jan 04, 2018   Procedure with GENERIC ANESTHESIA PROVIDER   [facility], Same Day Surgery (--)    [address] [address]   [phone]            Jan 04, 2018  8:00 AM CST   Ep 90 Minute with [email or web address]   [facility],  Heart Cath Lab ([facility] [place])    [address] [address]   [phone]            Feb 05, 2018 10:00 AM CST   Return Visit with DEJAH Alegre CNP   [facility] ([facility])    [address] [place] [address]   [phone]            Apr 09, 2018  1:40 PM CDT   Return Visit with Bekah Matt MD   [facility] AT [facility] ([facility])    [address] [place] [id number]   [phone]            May 22, 2018 10:00 AM CDT   M[facility] Lab Draw with  MASONIC LAB DRAW   [facility] Masonic Lab Draw ([facility])    [address] [facility] [address]   [phone]            May 22, 2018 10:30  AM CDT   Return with Augusto Miller MD   [facility] Blood and Marrow Transplant ([facility])    [address]  [address] [facility] [address] [phone]              Future tests that were ordered for you today     Open Future Orders        Priority Expected Expires Ordered    INR Routine 1/3/2018 12/4/2018 12/4/2017    CTA Angiogram heart Routine  12/4/2018 12/4/2017    Transesophageal Echocardiogram Routine  12/4/2018 12/4/2017    Zio Patch Holter Routine  12/4/2018 12/4/2017    EP Ablation/ EP Studies Routine  12/4/2018 12/4/2017            Who to contact     If you have questions or need follow up information about today's clinic visit or your schedule please contact [facility] directly at [phone].  Normal or non-critical lab and imaging results will be communicated to you by MyChart, letter or phone within 4 business days after the clinic has received the results. If you do not hear from us within 7 days, please contact the clinic through [employer] or phone. If you have a critical or abnormal lab result, we will notify you by phone as soon as possible.  Submit refill requests through Your Office Agent or call your pharmacy and they will forward the refill request to us. Please allow 3 business days for your refill to be completed.          Additional Information About Your Visit        [employer] [facility] Information     Your Office Agent gives you secure access to your electronic health record. If you see a primary care provider, you can also send messages to your care team and make appointments. If you have questions, please call your primary care clinic.  If you do not have a primary care provider, please call [phone] and they will assist you.        Care EveryWhere ID     This is your Care EveryWhere ID. This could be used by other organizations to access your [facility] medical records  [id number]        Your Vitals Were     Pulse Height Pulse Oximetry BMI (Body Mass Index)           73 1.829 m (6') 99% 28.21 kg/m2         Blood Pressure from Last 3 Encounters:   12/04/17 136/83   11/21/17 138/82   10/16/17 123/77    Weight from Last 3 Encounters:   12/04/17 94.3 kg (208 lb)   11/21/17 94.2 kg (207 lb 9.6 oz)   10/16/17 94.7 kg (208 lb 11.2 oz)              We Performed the Following     EKG 12-lead, tracing only (Future)          Today's Medication Changes          These changes are accurate as of: 12/4/17 11:08 AM.  If you have any questions, ask your nurse or doctor.               These medicines have changed or have updated prescriptions.        Dose/Directions    amoxicillin 875 MG tablet   Commonly known as:  AMOXIL   This may have changed:  additional instructions   Used for:  Acute sinusitis with symptoms > 10 days        Dose:  875 mg   Take 1 tablet (875 mg) by mouth 2 times daily   Quantity:  20 tablet   Refills:  0         Stop taking these medicines if you haven't already. Please contact your care team if you have questions.     metoprolol 25 MG 24 hr tablet   Commonly known as:  TOPROL-XL   Stopped by:  Bekah Matt MD                    Primary Care Provider Office Phone # Fax #    Anya Nowak [phone]       [address] [address]        Equal Access to Services     [facility] AH: Hadii sabas ku hadasho Soomaali, waaxda luqadaha, qaybta kaalmada adeegyamarko, priya cunningham. So [facility] [phone].    ATENCIÓN: Si habla español, tiene a barger disposición servicios gratuitos de asistencia lingüística. Llame al [phone].    We comply with applicable federal civil rights laws and [place] laws. We do not discriminate on the basis of race, color, national origin, age, disability, sex, sexual orientation, or gender identity.            Thank you!     Thank you for choosing [facility]  for your care. Our goal is always to provide you with excellent care. Hearing back from our patients is one way we can  continue to improve our services. Please take a few minutes to complete the written survey that you may receive in the mail after your visit with us. Thank you!             Your Updated Medication List - Protect others around you: Learn how to safely use, store and throw away your medicines at www.disposemymeds.org.          This list is accurate as of: 12/4/17 11:08 AM.  Always use your most recent med list.                   Brand Name Dispense Instructions for use Diagnosis    amoxicillin 875 MG tablet    AMOXIL    20 tablet    Take 1 tablet (875 mg) by mouth 2 times daily    Acute sinusitis with symptoms > 10 days       ARTIFICIAL TEAR SOLUTION OP      Apply  to eye. USE AS DIRECTED(DF)        CENTRUM PO      1 TABLET DAILY        folic acid 1 MG tablet    FOLVITE    90 tablet    Take 1 tablet (1,000 mcg) by mouth daily    Btcui-fukpze-yfoq disease, chronic (H), Lymphoma (H)       NYQUIL PO      Take by mouth as needed        pantoprazole 20 MG EC tablet    PROTONIX    90 tablet    Take 1 tablet (20 mg) by mouth daily    Dlwtz-jumawm-bvce disease, chronic (H), Hodgkin's disease of extranodal or solid organ site (H)       PREVIDENT 5000 DRY MOUTH 1.1 % Gel topical gel   Generic drug:  sodium fluoride dental gel      Take by mouth daily    Hodgkin's disease of extranodal or solid organ site (H), Status post bone marrow transplant (H), Hereditary hemochromatosis (H), Thyroid nodule       simvastatin 10 MG tablet    ZOCOR    90 tablet    TAKE 1 TABLET (10 MG) BY MOUTH AT BEDTIME    Hyperlipidemia with target LDL less than 100       sotalol 80 MG tablet    BETAPACE    90 tablet    Take 0.5 tablets (40 mg) by mouth 2 times daily Follow up visit needed with Dr Matt    Atrial fibrillation (H)       warfarin 5 MG tablet    COUMADIN    60 tablet    5 mg on Tue, Thu; 2.5 mg all other days or as directed by coumadin clinic    Atrial fibrillation, unspecified type (H), Antiphospholipid antibody syndrome (H)

## 2017-12-04 NOTE — NURSING NOTE
Chief Complaint   Patient presents with     Follow Up For     SVT, hx AF. Review zio. EKG     Vitals were taken and medications were reconciled. EKG was performed.    QASIM Boyd  9:37 AM

## 2017-12-04 NOTE — PATIENT INSTRUCTIONS
Medication Changes:   1. Stop metoprolol    You are scheduled for an Atrial Tachycardia Ablation, at [facility], [place], with Dr. Matt. The hospital is located at [address] [place] [address] on the [place] bank of the campus.       Date:__December 27, 2017____ Time:_10am_ (Call may come 30 minutes prior or 30 minutes after)  Pre-Anesthesia Phone Call  You do not need to come to the [facility].      Date: January 3, 2018  INR Check, SEAN (transesophageal echocardiogram), CT Angiogram  _9:45am__: Arrive to the [facility] Waiting Room for your: SEAN with INR check.  Directly following__: Arrive to the [facility] Waiting Room at the [facility] for your: CT      -If your INR is too high or too low, your procedure may be canceled, or you may be required to stay overnight in the hospital.     -If there is evidence of a clot in your heart on the SEAN,  the procedure will be canceled.     SEAN Instructions:  1. Nothing to eat or drink 6 hours prior.  2. No additional medication instructions.  3. You will receive sedation for the SEAN so you will need someone to drive you to and from the hospital and stay with you for 6 hours. Do not make any legal decisions for 24 hours. You may go home after this test.     CT Angiogram Instructions:  1. Nothing to eat or drink except water 3 hours prior.  2. Avoid caffeine, smoking, or strenuous activity on the day of the test.  3. You will receive contrast, so plan to drink extra water the day before and after your test.  4. If you take any of the following medications, please HOLD the day of:   * NSAIDS (Examples: ibuprofen/Advil/Motrin, naproxen/Aleve) the day of.   * Erectile dysfunction medications (Examples: Viagra, Cialis, Levitra).  5. Please allow 1 hour of time for preparation and testing. You are on the table for ~15 minutes. Your feet go in first, your head is not enclosed.      Date: January 4, 2018   Time: _5:30 am___to Unit [address] at the [facility]  Atrial Tachycardia  Ablation Procedure    1. Please review the attached instructions on showering before your procedure at the end of this letter.  2. Your history and physical will be completed by our nurse practitioner when you arrive.  3. Please do not eat anything for 8 hours prior to your procedure. You may have sips of water up until 2 hours prior to your arrival.  4. The morning of your procedure, you may take your scheduled medications with a sip of water - continue your blood thinner (warfarin).  **STOP SOTALOL 5 DAYS PRIOR TO PROCEDURE DATE**  5. You will receive general anesthesia for this procedure.   6. You will stay in the hospital overnight, and will need a .      Date: _February 5, 2018__10am__[place]__ Follow up with EP Nurse Practitioner  Date: _to be mailed and worn March 9th_____ 14 day Ziopatch hookup  Date: _April 9, 2018_1:40pm_ Follow up with Dr. Matt.       Please do not hesitate to utilize [employer] or call us if you have any questions or concerns.    Tina Mejia, RN  Electrophysiology Nurse Coordinator  [phone]    [facility] Procedure   [phone]          Showering Before Surgery   Your surgeon has asked you to take 2 showers before surgery.  Why is this important?  It is normal for bacteria (germs) to be on your skin. The skin protects us from these germs. When you have surgery, we cut the skin. Sometimes germs get into the cuts and cause infection (illness caused by germs). By following the instructions below and using special soap, you will lower the number of germs on your skin. This decreases your chance of infection.  Special soap  Buy or get 8 ounces of antiseptic surgical soap called 4% CHG. Common name brands of this soap are Hibiclens and Exidine.   You can find it at your local pharmacy, clinic or retail store. If you have trouble, ask your pharmacist to help you find the right substitute.   A note about shaving:  Do not shave within 12 inches of your incision (surgical  cut) area for at least 3 days before surgery. Shaving can make small cuts in the skin. This puts you at a higher risk of infection.  Items you will need for each shower:    1 newly washed towel    4 ounces of one of the above soaps  Follow these instructions:  The evening before surgery   1. Wash your hair and body with your regular shampoo and soap. Make sure you rinse the shampoo and soap from your hair and body.   2. Using clean hands, apply about 2 ounces of soap gently on your skin from the neck to your toes. Use on your groin area last. Do not use this soap on your face or head. If you get any soap in your eyes, ears or mouth, rinse right away.   3. Repeat step 2. It is very important to let the soap stay on your skin for at least 1 minute.   4. Rinse well and dry off using a clean towel.If you feel any tingling, itching or other irritation, rinse right away. It is normal to feel some coolness on the skin after using the antiseptic soap. Your skin may feel a bit dry after the shower, but do not use any lotions, creams or moisturizers. Do not use hair spray or other products in your hair.  5. Dress in freshly washed clothes or pajamas. Use fresh pillowcases and sheets on your bed.    The morning of surgery  1. Wash your hair and body with your regular shampoo and soap. Make sure you rinse the shampoo and soap from your hair and body.   2. Using clean hands, apply about 2 ounces of soap gently on your skin from the neck to your toes. Use on your groin area last. Do not use this soap on your face or head. If you get any soap in your eyes, ears or mouth, rinse right away.   3. Repeat step 2. It is very important to let the soap stay on your skin for at least 1 minute.   4. Rinse well and dry off using a clean towel.If you feel any tingling, itching or other irritation, rinse right away. It is normal to feel some coolness on the skin after using the antiseptic soap. Your skin may feel a bit dry after the shower,  but do not use any lotions, creams or moisturizers. Do not use hair spray or other products in your hair.  5. Dress in clean clothes.  If you have any questions about showering or an allergy to CHG soap, please call the Preadmissions Nursing Department at the hospital where you are having your surgery.  [facility] [place] ([place]): [phone]  This phone number will be answered between the hours of 8:00 a.m. and 6:30 p.m. Monday through Friday.

## 2017-12-04 NOTE — NURSING NOTE
Cardiac Testing: Patient given instructions regarding SEAN, CTA. Discussed purpose, preparation, procedure and when to expect results reported back to the patient. Patient demonstrated understanding of this information and agreed to call with further questions or concerns.  Return Appointment: Patient given instructions regarding scheduling next clinic visit. Patient demonstrated understanding of this information and agreed to call with further questions or concerns.  EP Procedure: Patient given instructions regarding  ablation Discussed purpose, preparation, and procedure with the patient. Patient demonstrated understanding of this information and agreed to call with further questions or concerns.  Patient stated he understood all health information given and agreed to call with further questions or concerns.

## 2017-12-04 NOTE — LETTER
12/4/2017      RE: Haresh Rock  [address] [place] [address]       Dear Colleague,    Thank you for the opportunity to participate in the care of your patient, Haresh Rock, at the [facility] HEART [facility]. Please see a copy of my visit note below.    HPI: Purpose of visit: patient comes back for follow-up of atrial tachycardia.    Mr. Haresh Rock is a very pleasant 67-year-old gentleman who is status post circumferential pulmonary vein isolation for paroxysmal atrial fibrillation in 03/2013.  His past medical history also includes renal HTN, s/p bone marrow transplant, hemochromatosis, Hodgkin's lymphoma, CKD stage 2, DVT, DMII.      Most recently, patient has been having episodes of high heart rates. Patient was seen by the nurse practitioner and a Zio patch was ordered on October 16, 2017. Metoprolol XL 50 mg once daily was started at the same time. Patient is also taking sotalol 40 mg twice daily. The Zio patch showed multiple episodes of atrial tachycardia after October 23 and October 24, 2017. The longest episode lasted for more than 2 hours with a heart rate of 110 bpm. Patient also wears Apple Watch and reports multiple tachycardia episodes of 110 to 130 bpm in the last few weeks.    Patient has some symptoms of palpitations but denies exertional angina, exertional dyspnea, frequent lightheadedness, presyncope or syncope.    PAST MEDICAL HISTORY:  Past Medical History:   Diagnosis Date     Antiphospholipid antibody syndrome (H) 2005    Ravi's     Atrial fibrillation (H) 4/2011     Cirrhosis (H)      CKD (chronic kidney disease) stage 1, GFR 90 ml/min or greater      CKD (chronic kidney disease) stage 2, GFR 60-89 ml/min      Diabetes mellitus type II     steroid induced     DJD (degenerative joint disease) of knee     SHAWN, worse on right     DVT (deep venous thrombosis) (H)      ED (erectile dysfunction)      Gallbladder polyp 5/2010      Tljuo-Wiveec-Kpux Disease 2007    BMT     Hemochromatosis      Hodgkin's disease NOS     5 cycles of ABVD in 2011     HTN (hypertension)      Hyperlipidaemia LDL goal <100      Myeloproliferative disorder (H)     atypical, [facility]     PE (pulmonary embolism) 11/2004     Polyneuropathy      Primary pulmonary hypertension (H)        CURRENT MEDICATIONS:  Current Outpatient Prescriptions   Medication Sig Dispense Refill     sotalol (BETAPACE) 80 MG tablet Take 0.5 tablets (40 mg) by mouth 2 times daily Follow up visit needed with Dr Matt 90 tablet 1     PREVIDENT 5000 DRY MOUTH 1.1 % GEL topical gel Take by mouth daily  3     folic acid (FOLVITE) 1 MG tablet Take 1 tablet (1,000 mcg) by mouth daily 90 tablet 3     pantoprazole (PROTONIX) 20 MG EC tablet Take 1 tablet (20 mg) by mouth daily 90 tablet 3     warfarin (COUMADIN) 5 MG tablet 5 mg on Tue, Thu; 2.5 mg all other days or as directed by coumadin clinic 60 tablet 5     simvastatin (ZOCOR) 10 MG tablet TAKE 1 TABLET (10 MG) BY MOUTH AT BEDTIME 90 tablet 3     Pseudoeph-Doxylamine-DM-APAP (NYQUIL PO) Take by mouth as needed       amoxicillin (AMOXIL) 875 MG tablet Take 1 tablet (875 mg) by mouth 2 times daily (Patient taking differently: Take 875 mg by mouth 2 times daily Prior to dental appts) 20 tablet 0     ARTIFICIAL TEAR SOLUTION OP Apply  to eye. USE AS DIRECTED(DF)       CENTRUM OR 1 TABLET DAILY         PAST SURGICAL HISTORY:  Past Surgical History:   Procedure Laterality Date     ANESTHESIA CARDIOVERSION  4/21/2011    Procedure:ANESTHESIA CARDIOVERSION; Surgeon:GENERIC ANESTHESIA PROVIDER; Location:[facility] OR     ARTHROSCOPY KNEE RT/LT      LT     COLONOSCOPY  10/16/2012    Procedure: COLONOSCOPY;  Colonoscopy, screening;  Surgeon: Reg Feliciano MD;  Location: [facility] OR     H ABLATION FOCAL AFIB  3/5/2013    PVI     HC BONE MARROW/STEM TRANSPLANT ALLOGENIC  5/8/2007     INSERT PORT VASCULAR ACCESS       JOINT REPLACEMTN, KNEE RT/LT  3/28/12    SHAWN      VASECTOMY  1990       ALLERGIES:     Allergies   Allergen Reactions     No Known Drug Allergy        FAMILY HISTORY:  - Premature coronary artery disease  - Atrial fibrillation  - Sudden cardiac death     SOCIAL HISTORY:  Social History   Substance Use Topics     Smoking status: Never Smoker     Smokeless tobacco: Never Used     Alcohol use No       ROS:   Constitutional: No fever, chills, or sweats. Weight stable.   ENT: No visual disturbance, ear ache, epistaxis, sore throat.   Cardiovascular: As per HPI.   Respiratory: No cough, hemoptysis.    GI: No nausea, vomiting, hematemesis, melena, or hematochezia.   : No hematuria.   Integument: Negative.   Psychiatric: Negative.   Hematologic:  Easy bruising, no easy bleeding.  Neuro: Negative.   Endocrinology: No significant heat or cold intolerance   Musculoskeletal: No myalgia.    Exam:  /83 (BP Location: Left arm, Patient Position: Chair, Cuff Size: Adult Regular)  Pulse 73  Ht 1.829 m (6')  Wt 94.3 kg (208 lb)  SpO2 99%  BMI 28.21 kg/m2  GENERAL APPEARANCE: healthy, alert and no distress  HEENT: no icterus, no xanthelasmas, normal pupil size and reaction, normal palate, mucosa moist, no central cyanosis  NECK: no adenopathy, no asymmetry, masses, or scars, thyroid normal to palpation and no bruits, JVP not elevated  RESPIRATORY: lungs clear to auscultation - no rales, rhonchi or wheezes, no use of accessory muscles, no retractions, respirations are unlabored, normal respiratory rate  CARDIOVASCULAR: regular rhythm, normal S1 with physiologic split S2, no S3 or S4 and no murmur, click or rub, precordium quiet with normal PMI.  ABDOMEN: soft, non tender, without hepatosplenomegaly, no masses palpable, bowel sounds normal, aorta not enlarged by palpation, no abdominal bruits  EXTREMITIES: peripheral pulses normal, no edema, no bruits  NEURO: alert and oriented to person/place/time, normal speech, gait and affect  VASC: Radial, femoral, dorsalis pedis and  posterior tibialis pulses are normal in volumes and symmetric bilaterally. No bruits are heard.  SKIN: no ecchymoses, no rashes    Labs:  CBC RESULTS:   Lab Results   Component Value Date    WBC 7.6 11/17/2017    RBC 4.79 11/17/2017    HGB 15.9 11/17/2017    HCT 44.6 11/17/2017    MCV 93 11/17/2017    MCH 33.2 (H) 11/17/2017    MCHC 35.7 11/17/2017    RDW 11.9 11/17/2017     (L) 11/17/2017       BMP RESULTS:  Lab Results   Component Value Date     11/17/2017    POTASSIUM 4.1 11/17/2017    CHLORIDE 101 11/17/2017    CO2 25 11/17/2017    ANIONGAP 6 11/17/2017     (H) 11/17/2017    BUN 26 11/17/2017    CR 1.10 11/17/2017    GFRESTIMATED 66 11/17/2017    GFRESTBLACK 80 11/17/2017    GILBERT 7.8 (L) 11/17/2017        INR RESULTS:  Lab Results   Component Value Date    INR 3.10 (H) 11/17/2017    INR 2.6 (A) 10/10/2017    INR 3.3 (A) 08/29/2017    INR 2.5 (A) 07/18/2017    INR 2.38 (H) 05/30/2017    INR 2.2 (A) 05/23/2017    INR 2.15 (H) 11/29/2016    INR 2.98 (H) 04/18/2016       Procedures:      Assessment and Plan:     Paroxysmal atrial tachycardia    I discussed extensively with patient the aims, risks, alternatives to catheter ablation for atrial tachycardia.    I explained to patient that risks of EP study and ablation procedure include, but are not limited to vascular injury, excessive bleeding requiring blood transfusion, aortic injury, cardiac tamponade requiring pericardiocentesis or open heart surgery, TIA, stroke, esophageal injury, pulmonary vein stenosis or death. Patient understood the risks and decided to proceed with procedure.    Patient was stopped the metoprolol today and the sotalol 5 days prior to the ablation procedure. Patient will continue on warfarin through the ablation. A day prior to the ablation patient will undergo a SEAN and CT scan of the heart. The procedure will be performed under general anesthesia and guided by CARTO 3 mapping.    All questions and concerns were addressed  and patient is happy with plan.    Sincerely,     Bekah Matt MD      CC  Patient Care Team:  Anya Nowak MD as PCP - General (Family Practice)  Elsie Thibodeaux MSW as   Augusto Miller MD as Referring Physician (Internal Medicine)  Eber Griffith MD as MD (Cardiology)  Jesusita Mejia PA-C as Physician Assistant  Jose Manuel Catalan MD as MD (Dermatology)  Pino Sharma MD as MD (Internal Medicine)  Fabi Jimenez MD as MD (INTERNAL MEDICINE - ENDOCRINOLOGY, DIABETES & METABOLISM)  Tina Mejia, RN as Nurse Coordinator (Clinical Cardiac Electrophysiology)  Bekah Matt MD as MD (Cardiology)  Bonnie Ford APRN CNP as Nurse Practitioner (Nurse Practitioner - Family)  ESTABLISHED PATIENT

## 2017-12-05 LAB — INTERPRETATION ECG - MUSE: NORMAL

## 2017-12-06 DIAGNOSIS — I47.10 PAROXYSMAL SUPRAVENTRICULAR TACHYCARDIA (H): Primary | ICD-10-CM

## 2017-12-06 NOTE — NURSING NOTE
Per patient request, on Dec 6, 2017 an order was faxed to [facility] requesting a Zio patch to be mailed to the patient. The patient is to wear the zio 14 days from March 9 - 23, 2018.    Natali North  Periop Electrophysiology   [phone]

## 2017-12-08 ENCOUNTER — OFFICE VISIT (OUTPATIENT)
Dept: FAMILY MEDICINE | Facility: CLINIC | Age: 67
End: 2017-12-08
Payer: COMMERCIAL

## 2017-12-08 VITALS
HEART RATE: 69 BPM | BODY MASS INDEX: 28.37 KG/M2 | DIASTOLIC BLOOD PRESSURE: 75 MMHG | WEIGHT: 209.2 LBS | SYSTOLIC BLOOD PRESSURE: 135 MMHG | TEMPERATURE: 97.5 F | OXYGEN SATURATION: 97 %

## 2017-12-08 DIAGNOSIS — H10.33 ACUTE BACTERIAL CONJUNCTIVITIS OF BOTH EYES: Primary | ICD-10-CM

## 2017-12-08 PROCEDURE — 99213 OFFICE O/P EST LOW 20 MIN: CPT | Performed by: FAMILY MEDICINE

## 2017-12-08 RX ORDER — POLYMYXIN B SULFATE AND TRIMETHOPRIM 1; 10000 MG/ML; [USP'U]/ML
1 SOLUTION OPHTHALMIC 4 TIMES DAILY
Qty: 2 ML | Refills: 0 | Status: SHIPPED | OUTPATIENT
Start: 2017-12-08 | End: 2017-12-15

## 2017-12-08 NOTE — PATIENT INSTRUCTIONS
What Is Conjunctivitis?    Conjunctivitis is an irritation or infection. It affects the membrane that covers the white of your eye and the inside of your eyelid (conjunctiva). It can happen to one or both eyes. The membrane swells and the blood vessels enlarge (dilate). This makes your eye red. That's why conjunctivitis is sometimes called red eye or pink eye.  What are the symptoms?  If you have one or more of these symptoms, see an eye doctor:    Redness in and around your eye    Eyes that are puffy and sore    Itching, burning, or stinging eyes    Watery eyes or discharge from your eye    Eyelids that are crusty or stuck together when you wake up in the morning    Pink color in the whites of one or both eyes  Getting treatment quickly can help prevent damage to your eyes.  How is it diagnosed?  Conjunctivitis is usually a minor eye infection. But it can sometimes become a more serious problem. Some more serious eye diseases have symptoms that look like conjunctivitis. So it's important for an eye doctor to diagnose you. Your eye doctor will ask about your symptoms and any medicines you take. He or she will ask about any illnesses or medical conditions you may have. The doctor will also check your eyes with a hand-held light and a special microscope called a slit lamp.  Date Last Reviewed: 6/11/2015 2000-2017 [employer]. [address] [place] [address], [place] [address]. All rights reserved. This information is not intended as a substitute for professional medical care. Always follow your healthcare professional's instructions.

## 2017-12-08 NOTE — PROGRESS NOTES
SUBJECTIVE:   Haresh Rock is a 67 year old male who presents to clinic today for the following health issues:      Eye(s) Problem  Onset: x1 day    Description:   Location: bilateral- mostly left  Pain: no  Redness: YES- and itching    Accompanying Signs & Symptoms:  Discharge/mattering: YES  Swelling: no  Visual changes: no  Fever: no  Nasal Congestion: no but reports some throat dryness  Bothered by bright lights: no    History:   Trauma: no   Foreign body exposure: YES- exposed to pink eye from daughter; she also had sinus infection     Precipitating factors:   Wearing contacts: no    Alleviating factors:  Improved by: none    Therapies Tried and outcome: eye rinse- no relief       Denies having any headaches or blurry vision.     Problem list and histories reviewed & adjusted, as indicated.  Additional history: as documented    Patient Active Problem List   Diagnosis     Hemochromatosis     Antiphospholipid antibody syndrome (H)     Shwkz-adxquh-pyvd disease, chronic (H)     Advanced directives, counseling/discussion     Polyneuropathy     Status post total knee replacements     Status post bone marrow transplant (H)     Hyperlipidemia with target LDL less than 100     ED (erectile dysfunction)     Atrial fibrillation (H)     S/P ablation of atrial fibrillation     Personal history of DVT (deep vein thrombosis)     Angioma of skin     SK (seborrheic keratosis)     Chronic rhinitis     Gallstones without obstruction of gallbladder     Hodgkin's disease of extranodal or solid organ site (H)     Long-term (current) use of anticoagulants [Z79.01]     Thyroid nodule     CKD (chronic kidney disease) stage 2, GFR 60-89 ml/min     Type 2 diabetes mellitus with stage 2 chronic kidney disease (H)     Renal hypertension     History of Hodgkin's lymphoma     History of irradiation, presenting hazards to health     Past Surgical History:   Procedure Laterality Date     ANESTHESIA CARDIOVERSION  4/21/2011     Procedure:ANESTHESIA CARDIOVERSION; Surgeon:GENERIC ANESTHESIA PROVIDER; Location:[facility] OR     ARTHROSCOPY KNEE RT/LT      LT     COLONOSCOPY  10/16/2012    Procedure: COLONOSCOPY;  Colonoscopy, screening;  Surgeon: Reg Feliciano MD;  Location: [facility] OR     H ABLATION FOCAL AFIB  3/5/2013    PVI     HC BONE MARROW/STEM TRANSPLANT ALLOGENIC  5/8/2007     INSERT PORT VASCULAR ACCESS       JOINT REPLACEMTN, KNEE RT/LT  3/28/12    SHAWN     VASECTOMY  1990       Social History   Substance Use Topics     Smoking status: Never Smoker     Smokeless tobacco: Never Used     Alcohol use No     Family History   Problem Relation Age of Onset     Hypertension Mother      CEREBROVASCULAR DISEASE Mother      Alzheimer Disease Father      DIABETES Paternal Aunt      GASTROINTESTINAL DISEASE Maternal Grandmother      colitis      Thyroid Disease Sister      CANCER No family hx of      C.A.D. No family hx of      Thyroid Cancer No family hx of          Current Outpatient Prescriptions   Medication Sig Dispense Refill     trimethoprim-polymyxin b (POLYTRIM) ophthalmic solution Apply 1 drop to eye 4 times daily for 7 days 2 mL 0     sotalol (BETAPACE) 80 MG tablet Take 0.5 tablets (40 mg) by mouth 2 times daily Follow up visit needed with Dr Matt 90 tablet 1     PREVIDENT 5000 DRY MOUTH 1.1 % GEL topical gel Take by mouth daily  3     folic acid (FOLVITE) 1 MG tablet Take 1 tablet (1,000 mcg) by mouth daily 90 tablet 3     pantoprazole (PROTONIX) 20 MG EC tablet Take 1 tablet (20 mg) by mouth daily 90 tablet 3     warfarin (COUMADIN) 5 MG tablet 5 mg on Tue, Thu; 2.5 mg all other days or as directed by coumadin clinic 60 tablet 5     simvastatin (ZOCOR) 10 MG tablet TAKE 1 TABLET (10 MG) BY MOUTH AT BEDTIME 90 tablet 3     Pseudoeph-Doxylamine-DM-APAP (NYQUIL PO) Take by mouth as needed       ARTIFICIAL TEAR SOLUTION OP Apply  to eye. USE AS DIRECTED(DF)       CENTRUM OR 1 TABLET DAILY       Allergies   Allergen Reactions     No Known  Drug Allergy          Reviewed and updated as needed this visit by clinical staff     Reviewed and updated as needed this visit by Provider         ROS:  Constitutional, HEENT, cardiovascular, pulmonary, gi and gu systems are negative, except as otherwise noted.      OBJECTIVE:   /75  Pulse 69  Temp 97.5  F (36.4  C) (Tympanic)  Wt 209 lb 3.2 oz (94.9 kg)  SpO2 97%  BMI 28.37 kg/m2  Body mass index is 28.37 kg/(m^2).  GENERAL: healthy, alert and no distress  EYES: Bilateral conjunctivitis the left worse than the right with mild yellowish discharge.  PERRL and EOMI     Diagnostic Test Results:  none     ASSESSMENT/PLAN:     Haresh was seen today for eye problem.    Diagnoses and all orders for this visit:    Acute bacterial conjunctivitis of both eyes  -     trimethoprim-polymyxin b (POLYTRIM) ophthalmic solution; Apply 1 drop to eye 4 times daily for 7 days      Patient education and Handout given       Follow up if symptoms fail to improve or worsen.      The patient was in agreement with the plan today and had no questions or concerns prior to leaving the clinic.        Erendira Dubois MD  [facility]

## 2017-12-08 NOTE — MR AVS SNAPSHOT
After Visit Summary   12/8/2017    Haresh Rock    MRN: [id number]           Patient Information     Date Of Birth          1950        Visit Information        Provider Department      12/8/2017 1:00 PM Erendira Dubois MD [facility]        Today's Diagnoses     Acute bacterial conjunctivitis of both eyes    -  1      Care Instructions      What Is Conjunctivitis?    Conjunctivitis is an irritation or infection. It affects the membrane that covers the white of your eye and the inside of your eyelid (conjunctiva). It can happen to one or both eyes. The membrane swells and the blood vessels enlarge (dilate). This makes your eye red. That's why conjunctivitis is sometimes called red eye or pink eye.  What are the symptoms?  If you have one or more of these symptoms, see an eye doctor:    Redness in and around your eye    Eyes that are puffy and sore    Itching, burning, or stinging eyes    Watery eyes or discharge from your eye    Eyelids that are crusty or stuck together when you wake up in the morning    Pink color in the whites of one or both eyes  Getting treatment quickly can help prevent damage to your eyes.  How is it diagnosed?  Conjunctivitis is usually a minor eye infection. But it can sometimes become a more serious problem. Some more serious eye diseases have symptoms that look like conjunctivitis. So it's important for an eye doctor to diagnose you. Your eye doctor will ask about your symptoms and any medicines you take. He or she will ask about any illnesses or medical conditions you may have. The doctor will also check your eyes with a hand-held light and a special microscope called a slit lamp.  Date Last Reviewed: 6/11/2015 2000-2017 [employer] [address] [place] [address], [place] [address]. All rights reserved. This information is not intended as a substitute for professional medical care. Always follow your healthcare professional's  instructions.                Follow-ups after your visit        Follow-up notes from your care team     Return if symptoms worsen or fail to improve.      Your next 10 appointments already scheduled     Dec 27, 2017 10:00 AM CST   (Arrive by 9:45 AM)   PAC PHONE RN ASSESSMENT with Gen Pac Rn   [facility] ([facility])    [address]  4th Floor  [facility] [address]   [phone]           Note: this is not an onsite visit; there is no need to come to the facility.            Jan 03, 2018  9:45 AM CST   LAB with ACUTE CARE LAB UU   [facility], Lab ([facility], [facility])    [address] [address]              Please do not eat 10-12 hours before your appointment if you are coming in fasting for labs on lipids, cholesterol, or glucose (sugar). This does not apply to pregnant women. Water, hot tea and black coffee (with nothing added) are okay. Do not drink other fluids, diet soda or chew gum.            Jan 03, 2018 10:30 AM CST   Ech Allan with [email or web address]   [facility], [place],  Echocardiography ([facility], [facility])    [address] [address]   [phone]           1.  Please bring or wear a comfortable two-piece outfit. 2.  Arrival time: -   [facility]:  arrive 75 minutes prior to examination time. -   [facility]:  arrive 90 minutes prior to examination time. -   [facility]:   arrive 15 minutes prior to examination time. 3.  Plan to have someone here to drive you home after the test. -   Someone should stay with you for 6 hours after your test. 4.  No food or drink: -   6 hours before the test 5.  If you take antacids or water pills (diuretics): Do not take them until after your test. You may take blood pressure medicine with a few sips of water. 6.  If you have diabetes: -   Morning slots preferred -   If you take insulin, call your diabetes  care team. Ask if you should take a   dose the morning of your test. -   If you take diabetes medicine by mouth, don't take it on the morning of your test. Bring it with you to take after the test. (If you have questions, call your diabetes care team.) 7.  Bring a list of any medicines you are taking. 8.  Do not drive for 24 hours after the test. 9.   A responsible adult must stay with you for 24 hours after the test.  10.  For any questions that cannot be answered, please contact the ordering physician            Jan 03, 2018  1:00 PM CST   (Arrive by 12:45 PM)   CT HEART ANGIO with [email or web address]   [facility], [place], [place] ([facility])    [address] [address] [phone]           Please allow two hours for this test.  Follow the instructions below:   The day before your exam, drink extra fluids at least six 8-ounce glasses (unless your doctor wants you to restrict your fluids).   No caffeine and no smoking the day of the test.   Do not eat or drink for 3 hours before your exam. You may take your morning medicines with small sips of water.   You may have or need a blood test (creatinine test) within 30 days of your exam. Go to your clinic or Diagnostic Imaging Department for this test.   If you take Viagra, Levitra or Cialis, stop taking it for 48 hours before your test.   If you are diabetic and take oral hypoglycemics, do not take them on the day of your test. Also, wait 48 hours before re-starting metformin (Avandamet, Glucophage, Glucovance, Metaglip).   Do not take diuretics on day of the test. This includes Furosemide (Lasix), Torsemide, Bumetanide (Bumex), Metolazone (Zaroxolyn) and Hydrochlorothiazide.   Do not take NSAIDS on the day of the test. This includes ibuprofen (Advil or Motrin), Naproxen and Indomethacin.  Bring any scans or X-rays taken at other hospitals, if similar tests were done. Also bring a list of your medicines, including  vitamins, minerals and over-the-counter drugs. It is safest to leave personal items at home.  Be sure to tell your doctor:   If you have any allergies, including any reaction to contrast.   If there s any chance you are pregnant.   If you are breastfeeding.   If you have any special needs.  Please wear loose clothing, such as a sweat suit or jogging clothes. Avoid snaps, zippers and other metal. We may ask you to undress and put on a hospital gown.  If you have any questions, please call the Imaging Department where you will have your exam.            Jan 04, 2018   Procedure with GENERIC ANESTHESIA PROVIDER   [facility], Same Day Surgery (--)    [address] [address]   [phone]            Jan 04, 2018  8:00 AM CST   Ep 90 Minute with [email or web address]   [facility],  Heart Cath Lab ([facility], [facility])    [address] [address]   [phone]            Feb 05, 2018 10:00 AM CST   Return Visit with DEJAH Alegre CNP   [facility] PHYSICIANS HEART AT [facility] ([facility] Clinics)    [address] [place] [address]   [phone]            Apr 09, 2018  1:40 PM CDT   Return Visit with Bekah Matt MD   [facility] PHYSICIANS HEART AT [facility] ([facility])    [address] [place] [address]   [phone]            May 22, 2018 10:00 AM CDT   Masonic Lab Draw with  MASONIC LAB DRAW   [facility] Masonic Lab Draw ([facility])    [address] [place] [address] [phone]            May 22, 2018 10:30 AM CDT   Return with Augusto Miller MD   [facility] Blood and Marrow Transplant ([facility])    [address] [place] [id number] [phone]              Who to contact     Normal or non-critical lab and imaging results will be communicated  to you by [employer], letter or phone within 4 business days after the clinic has received the results. If you do not hear from us within 7 days, please contact the clinic through [employer] or phone. If you have a critical or abnormal lab result, we will notify you by phone as soon as possible.  Submit refill requests through [employer] or call your pharmacy and they will forward the refill request to us. Please allow 3 business days for your refill to be completed.          If you need to speak with a  for additional information , please call: [phone]             Additional Information About Your Visit        [employer] Information     [employer] gives you secure access to your electronic health record. If you see a primary care provider, you can also send messages to your care team and make appointments. If you have questions, please call your primary care clinic.  If you do not have a primary care provider, please call [phone] and they will assist you.        Care EveryWhere ID     This is your Care EveryWhere ID. This could be used by other organizations to access your [place] medical records  [id number]        Your Vitals Were     Pulse Temperature Pulse Oximetry BMI (Body Mass Index)          69 97.5  F (36.4  C) (Tympanic) 97% 28.37 kg/m2         Blood Pressure from Last 3 Encounters:   12/08/17 135/75   12/04/17 136/83   11/21/17 138/82    Weight from Last 3 Encounters:   12/08/17 209 lb 3.2 oz (94.9 kg)   12/04/17 208 lb (94.3 kg)   11/21/17 207 lb 9.6 oz (94.2 kg)              Today, you had the following     No orders found for display         Today's Medication Changes          These changes are accurate as of: 12/8/17  1:17 PM.  If you have any questions, ask your nurse or doctor.               Start taking these medicines.        Dose/Directions    trimethoprim-polymyxin b ophthalmic solution   Commonly known as:  POLYTRIM   Used for:  Acute bacterial conjunctivitis of both eyes    Started by:  Erendira Dubois MD        Dose:  1 drop   Apply 1 drop to eye 4 times daily for 7 days   Quantity:  2 mL   Refills:  0            Where to get your medicines      These medications were sent to [facility]/pharmacy #[id number] - [place] - [address]  [address] [place] [address] [place] [address]     Phone:  [phone]     trimethoprim-polymyxin b ophthalmic solution                Primary Care Provider Office Phone # Fax #    Anya Nowak [phone] [address]  [place] [address]        Equal Access to Services     [facility]: Hadii aad ku hadasho Soomaali, waaxda luqadaha, qaybta kaalmada adeegyada, priya baron . So [facility] [phone].    ATENCIÓN: Si habla español, tiene a barger disposición servicios gratuitos de asistencia lingüística. [facility] [phone].    We comply with applicable federal civil rights laws and [place] laws. We do not discriminate on the basis of race, color, national origin, age, disability, sex, sexual orientation, or gender identity.            Thank you!     Thank you for choosing [facility]  for your care. Our goal is always to provide you with excellent care. Hearing back from our patients is one way we can continue to improve our services. Please take a few minutes to complete the written survey that you may receive in the mail after your visit with us. Thank you!             Your Updated Medication List - Protect others around you: Learn how to safely use, store and throw away your medicines at www.disposemymeds.org.          This list is accurate as of: 12/8/17  1:17 PM.  Always use your most recent med list.                   Brand Name Dispense Instructions for use Diagnosis    ARTIFICIAL TEAR SOLUTION OP      Apply  to eye. USE AS DIRECTED(DF)        CENTRUM PO      1 TABLET DAILY        folic acid 1 MG tablet    FOLVITE    90 tablet    Take 1 tablet (1,000 mcg) by mouth daily     Aikah-nvrlpc-fbir disease, chronic (H), Lymphoma (H)       NYQUIL PO      Take by mouth as needed        pantoprazole 20 MG EC tablet    PROTONIX    90 tablet    Take 1 tablet (20 mg) by mouth daily    Egamj-fpcjen-bjyq disease, chronic (H), Hodgkin's disease of extranodal or solid organ site (H)       PREVIDENT 5000 DRY MOUTH 1.1 % Gel topical gel   Generic drug:  sodium fluoride dental gel      Take by mouth daily    Hodgkin's disease of extranodal or solid organ site (H), Status post bone marrow transplant (H), Hereditary hemochromatosis (H), Thyroid nodule       simvastatin 10 MG tablet    ZOCOR    90 tablet    TAKE 1 TABLET (10 MG) BY MOUTH AT BEDTIME    Hyperlipidemia with target LDL less than 100       sotalol 80 MG tablet    BETAPACE    90 tablet    Take 0.5 tablets (40 mg) by mouth 2 times daily Follow up visit needed with Dr Matt    Atrial fibrillation (H)       trimethoprim-polymyxin b ophthalmic solution    POLYTRIM    2 mL    Apply 1 drop to eye 4 times daily for 7 days    Acute bacterial conjunctivitis of both eyes       warfarin 5 MG tablet    COUMADIN    60 tablet    5 mg on Tue, Thu; 2.5 mg all other days or as directed by coumadin clinic    Atrial fibrillation, unspecified type (H), Antiphospholipid antibody syndrome (H)

## 2017-12-12 DIAGNOSIS — E78.5 HYPERLIPIDEMIA WITH TARGET LDL LESS THAN 100: ICD-10-CM

## 2017-12-12 DIAGNOSIS — N18.2 TYPE 2 DIABETES MELLITUS WITH STAGE 2 CHRONIC KIDNEY DISEASE, WITHOUT LONG-TERM CURRENT USE OF INSULIN (H): Primary | ICD-10-CM

## 2017-12-12 DIAGNOSIS — E11.22 TYPE 2 DIABETES MELLITUS WITH STAGE 2 CHRONIC KIDNEY DISEASE, WITHOUT LONG-TERM CURRENT USE OF INSULIN (H): Primary | ICD-10-CM

## 2017-12-12 DIAGNOSIS — N18.2 CKD (CHRONIC KIDNEY DISEASE) STAGE 2, GFR 60-89 ML/MIN: ICD-10-CM

## 2017-12-13 RX ORDER — SIMVASTATIN 10 MG
TABLET ORAL
Qty: 30 TABLET | Refills: 0 | Status: SHIPPED | OUTPATIENT
Start: 2017-12-13 | End: 2018-01-09

## 2017-12-13 NOTE — TELEPHONE ENCOUNTER
Signed Prescriptions:                        Disp   Refills    simvastatin (ZOCOR) 10 MG tablet           30 tab*0        Sig: TAKE 1 TABLET (10 MG) BY MOUTH AT BEDTIME  Authorizing Provider: PROMISE FENTON    No further refills until follow-up appointment

## 2017-12-13 NOTE — TELEPHONE ENCOUNTER
Routing refill request to provider for review/approval because:  Labs not current:  BELLO Ferguson RN - BC

## 2018-01-01 ENCOUNTER — TRANSFERRED RECORDS (OUTPATIENT)
Dept: HEALTH INFORMATION MANAGEMENT | Facility: CLINIC | Age: 68
End: 2018-01-01

## 2018-01-01 ENCOUNTER — HOSPITAL ENCOUNTER (EMERGENCY)
Facility: CLINIC | Age: 68
Discharge: HOME | End: 2018-01-01
Attending: EMERGENCY MEDICINE | Admitting: EMERGENCY MEDICINE
Payer: MEDICARE

## 2018-01-01 ENCOUNTER — NURSE TRIAGE (OUTPATIENT)
Dept: NURSING | Facility: CLINIC | Age: 68
End: 2018-01-01

## 2018-01-01 VITALS
RESPIRATION RATE: 16 BRPM | OXYGEN SATURATION: 97 % | SYSTOLIC BLOOD PRESSURE: 137 MMHG | HEIGHT: 72 IN | DIASTOLIC BLOOD PRESSURE: 89 MMHG | BODY MASS INDEX: 27.77 KG/M2 | WEIGHT: 205 LBS | TEMPERATURE: 98.3 F

## 2018-01-01 DIAGNOSIS — I48.92 PAROXYSMAL ATRIAL FLUTTER (H): ICD-10-CM

## 2018-01-01 PROCEDURE — 25000132 ZZH RX MED GY IP 250 OP 250 PS 637: Mod: GY | Performed by: EMERGENCY MEDICINE

## 2018-01-01 PROCEDURE — 99284 EMERGENCY DEPT VISIT MOD MDM: CPT | Mod: 25 | Performed by: EMERGENCY MEDICINE

## 2018-01-01 PROCEDURE — 93005 ELECTROCARDIOGRAM TRACING: CPT | Performed by: EMERGENCY MEDICINE

## 2018-01-01 PROCEDURE — 99283 EMERGENCY DEPT VISIT LOW MDM: CPT | Performed by: EMERGENCY MEDICINE

## 2018-01-01 RX ORDER — METOPROLOL TARTRATE 25 MG/1
50 TABLET, FILM COATED ORAL 2 TIMES DAILY
Qty: 20 TABLET | Refills: 0 | Status: ON HOLD | OUTPATIENT
Start: 2018-01-01 | End: 2018-01-05

## 2018-01-01 RX ORDER — METOPROLOL TARTRATE 50 MG
50 TABLET ORAL ONCE
Status: COMPLETED | OUTPATIENT
Start: 2018-01-01 | End: 2018-01-01

## 2018-01-01 RX ADMIN — METOPROLOL TARTRATE 50 MG: 50 TABLET ORAL at 22:18

## 2018-01-01 ASSESSMENT — ENCOUNTER SYMPTOMS
CONSTITUTIONAL NEGATIVE: 1
SHORTNESS OF BREATH: 0
PALPITATIONS: 1
LIGHT-HEADEDNESS: 1

## 2018-01-01 NOTE — ED AVS SNAPSHOT
[facility], Emergency Department    [address] [address]    Phone:  [phone]                                       Haresh Rock   MRN: [id number]    Department:  [facility], Emergency Department   Date of Visit:  1/1/2018           Patient Information     Date Of Birth          1950        Your diagnoses for this visit were:     Paroxysmal atrial flutter (H)        You were seen by Markie Matthew MD.        Discharge Instructions       Start metoprolol 50 mg twice daily  Return if your tachycardia does not resolve or you develop concerning symptoms    Future Appointments        Provider Department Dept Phone Center    1/3/2018 9:45 AM ACUTE CARE LAB [facility] Lab  [place]    1/3/2018 10:30 AM [facility] ECHO SEAN ROOM [facility],  Echocardiography [phone] [facility]    1/3/2018 1:00 PM [facility] CT ROOM 4 [facility] [phone] [place]    1/4/2018 8:00 AM [facility] HEART CATH LAB ROOM 1 [facility]  Heart Cath Lab [phone] [facility]    2/5/2018 10:00 AM DEJAH Green CNP [facility] PHYSICIANS HEART AT [facility] [phone] [facility] PSA CLIN    4/9/2018 1:40 PM Bekah Matt MD [facility] PHYSICIANS HEART AT [facility] [phone] [facility] PSA CLIN    5/22/2018 10:00 AM Masonic Lab Draw [facility] Masonic Lab Draw [phone] [facility]    5/22/2018 10:30 AM Augusto Miller MD [facility] Blood and Marrow Transplant [phone] [facility]      24 Hour Appointment Hotline       To make an appointment at any [place] clinic, call [phone] ([phone]). If you don't have a family doctor or clinic, we will help you find one. [place] clinics are conveniently located to serve the needs of you and your family.             Review of your medicines      START taking        Dose / Directions Last dose taken    metoprolol 25 MG tablet   Commonly known as:   LOPRESSOR   Dose:  50 mg   Quantity:  20 tablet        Take 2 tablets (50 mg) by mouth 2 times daily   Refills:  0          Our records show that you are taking the medicines listed below. If these are incorrect, please call your family doctor or clinic.        Dose / Directions Last dose taken    ARTIFICIAL TEAR SOLUTION OP        Apply  to eye. USE AS DIRECTED(DF)   Refills:  0        CENTRUM PO        1 TABLET DAILY   Refills:  0        folic acid 1 MG tablet   Commonly known as:  FOLVITE   Dose:  1000 mcg   Quantity:  90 tablet        Take 1 tablet (1,000 mcg) by mouth daily   Refills:  3        NYQUIL PO        Take by mouth as needed   Refills:  0        pantoprazole 20 MG EC tablet   Commonly known as:  PROTONIX   Dose:  20 mg   Quantity:  90 tablet        Take 1 tablet (20 mg) by mouth daily   Refills:  3        PREVIDENT 5000 DRY MOUTH 1.1 % Gel topical gel   Generic drug:  sodium fluoride dental gel        Take by mouth daily   Refills:  3        simvastatin 10 MG tablet   Commonly known as:  ZOCOR   Quantity:  30 tablet        TAKE 1 TABLET (10 MG) BY MOUTH AT BEDTIME   Refills:  0        sotalol 80 MG tablet   Commonly known as:  BETAPACE   Dose:  40 mg   Quantity:  90 tablet        Take 0.5 tablets (40 mg) by mouth 2 times daily Follow up visit needed with Dr Matt   Refills:  1        warfarin 5 MG tablet   Commonly known as:  COUMADIN   Quantity:  60 tablet        5 mg on Tue, Thu; 2.5 mg all other days or as directed by coumadin clinic   Refills:  5                Prescriptions were sent or printed at these locations (1 Prescription)                   Other Prescriptions                Printed at Department/Unit printer (1 of 1)         metoprolol (LOPRESSOR) 25 MG tablet                Procedures and tests performed during your visit     EKG 12 lead      Orders Needing Specimen Collection     None      Pending Results     Date and Time Order Name Status Description    1/1/2018 2049 EKG 12 lead  Preliminary             Pending Culture Results     No orders found from 12/30/2017 to 1/2/2018.            Pending Results Instructions     If you had any lab results that were not finalized at the time of your Discharge, you can call the ED Lab Result RN at [phone]. You will be contacted by this team for any positive Lab results or changes in treatment. The nurses are available 7 days a week from 10A to 6:30P.  You can leave a message 24 hours per day and they will return your call.        Thank you for choosing [facility]       Thank you for choosing [facility] for your care. Our goal is always to provide you with excellent care. Hearing back from our patients is one way we can continue to improve our services. Please take a few minutes to complete the written survey that you may receive in the mail after you visit with us. Thank you!        [employer] Information     [employer] gives you secure access to your electronic health record. If you see a primary care provider, you can also send messages to your care team and make appointments. If you have questions, please call your primary care clinic.  If you do not have a primary care provider, please call [phone] and they will assist you.        Care EveryWhere ID     This is your Care EveryWhere ID. This could be used by other organizations to access your [place] medical records  [id number]        Equal Access to Services     JOSELINE MONGE : Luisa Ann, waaxda luelviaadaha, qaybta kaalmada adehelen, priya cunningham. So [facility] [phone].    ATENCIÓN: Si habla español, tiene a barger disposición servicios gratuitos de asistencia lingüística. Llame al [phone].    We comply with applicable federal civil rights laws and [place] laws. We do not discriminate on the basis of race, color, national origin, age, disability, sex, sexual orientation, or gender identity.            After Visit Summary       This is your record.  Keep this with you and show to your community pharmacist(s) and doctor(s) at your next visit.

## 2018-01-01 NOTE — ED AVS SNAPSHOT
[facility], [place], Emergency Department    [address] [place] [address]    Phone:  [phone]                                       Haresh Rock   MRN: [id number]    Department:  [facility], Emergency Department   Date of Visit:  1/1/2018           After Visit Summary Signature Page     I have received my discharge instructions, and my questions have been answered. I have discussed any challenges I see with this plan with the nurse or doctor.    ..........................................................................................................................................  Patient/Patient Representative Signature      ..........................................................................................................................................  Patient Representative Print Name and Relationship to Patient    ..................................................               ................................................  Date                                            Time    ..........................................................................................................................................  Reviewed by Signature/Title    ...................................................              ..............................................  Date                                                            Time

## 2018-01-02 ENCOUNTER — CARE COORDINATION (OUTPATIENT)
Dept: CARDIOLOGY | Facility: CLINIC | Age: 68
End: 2018-01-02

## 2018-01-02 LAB — INTERPRETATION ECG - MUSE: NORMAL

## 2018-01-02 NOTE — ED PROVIDER NOTES
History     Chief Complaint   Patient presents with     Tachycardia     HPI  Haresh Rock is a 67 year old male with a history of paroxysmal atrial fibrillation, status-post circumferential pulmonary vein isolation (03/2013) renal HTN, hemochromatosis, Hodgkin's lymphoma, status-post bone marrow transplant, CKD stage II, DVT and DMII. The patient presents to the emergency department today with palpitations. The patient reports that he had 2 episodes of elevated heart rate at home today; patient reports that he wears a fitness watch that shows his heart rate and it was 171 bpm this morning and 224 bpm about 2 hours ago. He states that this is much faster than his usual rate around 110s-120s, prompting his arrival to the ED. He notes that his blood pressure was 160/107 at that time.  He complains of associated lightheadedness, denies syncope. The patient reports that he is scheduled for a cardiac ablation on Thursday, 3 days from now, and will have a cardiac workup including echo and SEAN prior to that on Wednesday, 2 days from now.  He is currently anticoagulated on warfarin; patient notes that he had previously been on sotalol, though it was stopped on Friday 3 days ago, in preparation for his upcoming ablation.  Patient reports that he had a Zio patch in 10/2017 that revealed 3 episodes of atrial tachycardia.  Patient reports that he has not eaten or drank very much night.  He notes that he was doing activity prior to the onset of the palpitations this evening.    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     Hemochromatosis      Hodgkin's disease NOS     5 cycles of ABVD in 2011     HTN (hypertension)      Hyperlipidaemia LDL goal <100      Myeloproliferative disorder (H)     atypical, [facility]     PE (pulmonary embolism) 11/2004     Polyneuropathy      Primary pulmonary hypertension (H)        Past Surgical History:   Procedure Laterality Date     ANESTHESIA CARDIOVERSION  4/21/2011    Procedure:ANESTHESIA CARDIOVERSION; Surgeon:GENERIC ANESTHESIA PROVIDER; Location:[facility] OR     ARTHROSCOPY KNEE RT/LT      LT     COLONOSCOPY  10/16/2012    Procedure: COLONOSCOPY;  Colonoscopy, screening;  Surgeon: Reg Feliciano MD;  Location: [facility] OR     H ABLATION FOCAL AFIB  3/5/2013    PVI     HC BONE MARROW/STEM TRANSPLANT ALLOGENIC  5/8/2007     INSERT PORT VASCULAR ACCESS       JOINT REPLACEMTN, KNEE RT/LT  3/28/12    SHAWN     VASECTOMY  1990       Family History   Problem Relation Age of Onset     Hypertension Mother      CEREBROVASCULAR DISEASE Mother      Alzheimer Disease Father      DIABETES Paternal Aunt      GASTROINTESTINAL DISEASE Maternal Grandmother      colitis      Thyroid Disease Sister      CANCER No family hx of      C.A.D. No family hx of      Thyroid Cancer No family hx of        Social History   Substance Use Topics     Smoking status: Never Smoker     Smokeless tobacco: Never Used     Alcohol use No     Current Facility-Administered Medications   Medication     metoprolol (LOPRESSOR) tablet 50 mg     Current Outpatient Prescriptions   Medication     metoprolol (LOPRESSOR) 25 MG tablet     simvastatin (ZOCOR) 10 MG tablet     folic acid (FOLVITE) 1 MG tablet     pantoprazole (PROTONIX) 20 MG EC tablet     warfarin (COUMADIN) 5 MG tablet     sotalol (BETAPACE) 80 MG tablet     PREVIDENT 5000 DRY MOUTH 1.1 % GEL topical gel     Pseudoeph-Doxylamine-DM-APAP (NYQUIL PO)     ARTIFICIAL TEAR SOLUTION OP     CENTRUM OR         Allergies   Allergen Reactions     No Known Drug Allergy         I have reviewed the Medications, Allergies, Past Medical and Surgical History, and Social History in the Epic system.    Review of Systems   Constitutional: Negative.    Respiratory: Negative for shortness of breath.    Cardiovascular: Positive for palpitations. Negative for chest pain.   Neurological: Positive for light-headedness. Negative for syncope.   All other systems reviewed and are negative.      Physical Exam   BP: (!) 126/96  Heart Rate: 104  Temp: 98.3  F (36.8  C)  Resp: 16  Height: 182.9 cm (6')  Weight: 93 kg (205 lb)  SpO2: 98 %      Physical Exam   Constitutional: He is oriented to person, place, and time. He appears well-developed and well-nourished. No distress.   HENT:   Head: Normocephalic and atraumatic.   Cardiovascular: Normal rate, regular rhythm and normal heart sounds.    Pulmonary/Chest: Effort normal and breath sounds normal. No respiratory distress. He has no wheezes.   Abdominal: There is no tenderness.   Neurological: He is alert and oriented to person, place, and time.   Skin: Skin is warm and dry.   Psychiatric: He has a normal mood and affect. His behavior is normal.   Nursing note and vitals reviewed.      ED Course     ED Course     Procedures     9:08 PM  The patient was seen and examined by Dr. Matthew in Room 11.               EKG Interpretation:      Interpreted by Markie Matthew  Time reviewed: 9:49 PM   Symptoms at time of EKG: none   Rhythm: normal sinus   Rate: 101  Axis: normal  Ectopy: none  Conduction: normal  ST Segments/ T Waves: No ST-T wave changes  Q Waves: none  Comparison to prior: [id number]/04/2017    Clinical Impression: sinus tach          Medications   metoprolol (LOPRESSOR) tablet 50 mg (not administered)     Discussed with EP fellow who reviewed the rhythm and thinks it is flutter with one-to-one conduction.  Recommend starting metoprolol 50 mg twice daily       Labs Ordered  and Resulted from Time of ED Arrival Up to the Time of Departure from the ED - No data to display         Assessments & Plan (with Medical Decision Making)   67 year old gentleman who had a spell of tachycardia today that was captured by his watch and by a single lead EKG machine. I transmitted the rhythm to the EP Fellow, who believes he had gone into atrial flutter with 1 to 1 conduction. He is currently in sinus rhythm with a rate of 100. The Fellow recommends starting metoprolol 50 mg BID. I gave him a first dose here. Patient states he has metoprolol at home. He ll be discharged to follow up this week for his ECHO and ablation. The Fellow was going to send a note to Dr. Matt. He may return to the Emergency Department if he has any problems or concerns, particularly if he has a tachycardia that doesn t terminate.     I have reviewed the nursing notes.    I have reviewed the findings, diagnosis, plan and need for follow up with the patient.  This part of the document was transcribed by Soha Langston, Medical Scribe.   New Prescriptions    METOPROLOL (LOPRESSOR) 25 MG TABLET    Take 2 tablets (50 mg) by mouth 2 times daily       Final diagnoses:   Paroxysmal atrial flutter (H)     I, Francisco Sargent, am serving as a trained medical scribe to document services personally performed by Markie Matthew MD, based on the provider's statements to me.   IMarkie MD, was physically present and have reviewed and verified the accuracy of this note documented by Francisco Sargent.     1/1/2018   [facility], [place], EMERGENCY DEPARTMENT     Markie Matthew MD  01/01/18 5843

## 2018-01-02 NOTE — PROGRESS NOTES
Patient called in notifying Dr. Matt's team that he was in the ED yesterday for elevated heart rates.     He was found to be in AFL 1:1 via [employer] bia which was given to the EP fellow (below). He was prescribed metoprolol 50mg BID. He has upcoming AT ablation on Thursday 1/4/18. He is curious if he should start the metoprolol, as Dr. Matt had dc'd it in December. He also stopped his sotalol 40mg BID on Friday 12/29/17 as instructed pre-procedurally.     Emailed Dr. Matt for recommendations:   The EP fellow has already instructed pt to take metoprolol and I am OK with that.    No change in planning as for an AF ablation.    Thanks,  LYC      Updated patient via phone. He will begin metoprolol 50mg BID tonight.   Patient verbalized understanding and is in agreement to the plan.

## 2018-01-02 NOTE — TELEPHONE ENCOUNTER
My heart rate has been up today. Once it was as indra as 224. Now it's 121 and irregular. /107. Has been feeling light headed off and on today. Scheduled for ablation on 1/4/2018.    Elana Christy RN, [facility]    Reason for Disposition    [1] Heart beating very rapidly (e.g., > 140 / minute) AND [2] present now  (Exception: during exercise)    Additional Information    Negative: Passed out (i.e., lost consciousness, collapsed and was not responding)    Negative: Shock suspected (e.g., cold/pale/clammy skin, too weak to stand, low BP, rapid pulse)    Negative: Difficult to awaken or acting confused  (e.g., disoriented, slurred speech)    Negative: Visible sweat on face or sweat dripping down face    Negative: Unable to walk, or can only walk with assistance (e.g., requires support)    Negative: [1] Received SHOCK from implantable cardiac defibrillator AND [2] persisting symptoms (i.e., palpitations, lightheadedness)    Negative: Sounds like a life-threatening emergency to the triager    Negative: Chest pain    Protocols used: HEART RATE AND HEARTBEAT QUESTIONS-ADULT-

## 2018-01-02 NOTE — DISCHARGE INSTRUCTIONS
Start metoprolol 50 mg twice daily  Return if your tachycardia does not resolve or you develop concerning symptoms

## 2018-01-02 NOTE — ED NOTES
Patient presents with reported tachycardia and hypertension intermittently since earlier today. Per patient, his apple watch states his HR was in the 200's. Patient is scheduled for SEAN and angio this Wednesday and an ablation for this Thursday. Last ablation 4 yrs ago. Currently not taking Sotalol d/t upcoming surgery, last dose last Friday 12/29.

## 2018-01-03 ENCOUNTER — HOSPITAL ENCOUNTER (OUTPATIENT)
Dept: CT IMAGING | Facility: CLINIC | Age: 68
End: 2018-01-03
Attending: INTERNAL MEDICINE
Payer: MEDICARE

## 2018-01-03 ENCOUNTER — HOSPITAL ENCOUNTER (OUTPATIENT)
Dept: CARDIOLOGY | Facility: CLINIC | Age: 68
Discharge: HOME | End: 2018-01-03
Attending: INTERNAL MEDICINE | Admitting: INTERNAL MEDICINE
Payer: MEDICARE

## 2018-01-03 ENCOUNTER — ANESTHESIA EVENT (OUTPATIENT)
Dept: SURGERY | Facility: CLINIC | Age: 68
End: 2018-01-03
Payer: MEDICARE

## 2018-01-03 VITALS
OXYGEN SATURATION: 98 % | DIASTOLIC BLOOD PRESSURE: 73 MMHG | SYSTOLIC BLOOD PRESSURE: 134 MMHG | HEART RATE: 67 BPM | RESPIRATION RATE: 16 BRPM

## 2018-01-03 DIAGNOSIS — I47.10 PAROXYSMAL SUPRAVENTRICULAR TACHYCARDIA (H): ICD-10-CM

## 2018-01-03 LAB
CREAT SERPL-MCNC: 1.13 MG/DL (ref 0.66–1.25)
GFR SERPL CREATININE-BSD FRML MDRD: 64 ML/MIN/1.7M2
INR PPP: 4.22 (ref 0.86–1.14)

## 2018-01-03 PROCEDURE — 93325 DOPPLER ECHO COLOR FLOW MAPG: CPT | Mod: 26 | Performed by: INTERNAL MEDICINE

## 2018-01-03 PROCEDURE — 25000128 H RX IP 250 OP 636: Performed by: INTERNAL MEDICINE

## 2018-01-03 PROCEDURE — 99152 MOD SED SAME PHYS/QHP 5/>YRS: CPT

## 2018-01-03 PROCEDURE — 85610 PROTHROMBIN TIME: CPT | Performed by: INTERNAL MEDICINE

## 2018-01-03 PROCEDURE — 93320 DOPPLER ECHO COMPLETE: CPT | Mod: 26 | Performed by: INTERNAL MEDICINE

## 2018-01-03 PROCEDURE — 82565 ASSAY OF CREATININE: CPT | Performed by: STUDENT IN AN ORGANIZED HEALTH CARE EDUCATION/TRAINING PROGRAM

## 2018-01-03 PROCEDURE — 93320 DOPPLER ECHO COMPLETE: CPT

## 2018-01-03 PROCEDURE — 75572 CT HRT W/3D IMAGE: CPT | Mod: GA

## 2018-01-03 PROCEDURE — 93312 ECHO TRANSESOPHAGEAL: CPT | Mod: 26 | Performed by: INTERNAL MEDICINE

## 2018-01-03 PROCEDURE — 75572 CT HRT W/3D IMAGE: CPT | Mod: GA | Performed by: INTERNAL MEDICINE

## 2018-01-03 PROCEDURE — 36415 COLL VENOUS BLD VENIPUNCTURE: CPT | Performed by: STUDENT IN AN ORGANIZED HEALTH CARE EDUCATION/TRAINING PROGRAM

## 2018-01-03 PROCEDURE — 25000125 ZZHC RX 250: Performed by: INTERNAL MEDICINE

## 2018-01-03 RX ORDER — FLUMAZENIL 0.1 MG/ML
0.2 INJECTION, SOLUTION INTRAVENOUS
Status: DISCONTINUED | OUTPATIENT
Start: 2018-01-03 | End: 2018-01-04 | Stop reason: HOSPADM

## 2018-01-03 RX ORDER — FENTANYL CITRATE 50 UG/ML
25-50 INJECTION, SOLUTION INTRAMUSCULAR; INTRAVENOUS
Status: DISCONTINUED | OUTPATIENT
Start: 2018-01-03 | End: 2018-01-04 | Stop reason: HOSPADM

## 2018-01-03 RX ORDER — NALOXONE HYDROCHLORIDE 0.4 MG/ML
.1-.4 INJECTION, SOLUTION INTRAMUSCULAR; INTRAVENOUS; SUBCUTANEOUS
Status: DISCONTINUED | OUTPATIENT
Start: 2018-01-03 | End: 2018-01-04 | Stop reason: HOSPADM

## 2018-01-03 RX ORDER — IOPAMIDOL 755 MG/ML
120 INJECTION, SOLUTION INTRAVASCULAR ONCE
Status: COMPLETED | OUTPATIENT
Start: 2018-01-03 | End: 2018-01-03

## 2018-01-03 RX ORDER — SODIUM CHLORIDE 9 MG/ML
INJECTION, SOLUTION INTRAVENOUS CONTINUOUS PRN
Status: DISCONTINUED | OUTPATIENT
Start: 2018-01-03 | End: 2018-01-04 | Stop reason: HOSPADM

## 2018-01-03 RX ORDER — FENTANYL CITRATE 50 UG/ML
25 INJECTION, SOLUTION INTRAMUSCULAR; INTRAVENOUS
Status: DISCONTINUED | OUTPATIENT
Start: 2018-01-03 | End: 2018-01-04 | Stop reason: HOSPADM

## 2018-01-03 RX ORDER — LIDOCAINE 40 MG/G
CREAM TOPICAL
Status: DISCONTINUED | OUTPATIENT
Start: 2018-01-03 | End: 2018-01-04 | Stop reason: HOSPADM

## 2018-01-03 RX ADMIN — FENTANYL CITRATE 50 MCG: 50 INJECTION INTRAMUSCULAR; INTRAVENOUS at 11:29

## 2018-01-03 RX ADMIN — LIDOCAINE HYDROCHLORIDE 30 ML: 20 SOLUTION ORAL; TOPICAL at 11:23

## 2018-01-03 RX ADMIN — TOPICAL ANESTHETIC 0.5 ML: 200 SPRAY DENTAL; PERIODONTAL at 11:23

## 2018-01-03 RX ADMIN — IOPAMIDOL 120 ML: 755 INJECTION, SOLUTION INTRAVENOUS at 12:10

## 2018-01-03 RX ADMIN — MIDAZOLAM 1 MG: 1 INJECTION INTRAMUSCULAR; INTRAVENOUS at 11:29

## 2018-01-03 NOTE — PROGRESS NOTES
Pt here for SEAN. Procedure was explained to the pt and the consent was signed. Discharge instructions were reviewed and a copy was given to the pt. Pt was given Fentanyl 50 mcg IV and Versed 1 mg IV for sedation. Pt tolerated the procedure without any adverse effects. Pt denies any pain or discomfort post procedure. Probe # 53 was used. Pt to remain NPO until 1:20 pm. Pt will be discharged with wife to drive home following his CT scan.

## 2018-01-04 ENCOUNTER — SURGERY (OUTPATIENT)
Age: 68
End: 2018-01-04

## 2018-01-04 ENCOUNTER — HOSPITAL ENCOUNTER (OUTPATIENT)
Facility: CLINIC | Age: 68
Discharge: HOME | End: 2018-01-05
Attending: INTERNAL MEDICINE | Admitting: INTERNAL MEDICINE
Payer: MEDICARE

## 2018-01-04 ENCOUNTER — ANESTHESIA (OUTPATIENT)
Dept: SURGERY | Facility: CLINIC | Age: 68
End: 2018-01-04
Payer: MEDICARE

## 2018-01-04 ENCOUNTER — APPOINTMENT (OUTPATIENT)
Dept: CARDIOLOGY | Facility: CLINIC | Age: 68
End: 2018-01-04
Attending: INTERNAL MEDICINE
Payer: MEDICARE

## 2018-01-04 DIAGNOSIS — I35.8 AORTIC VALVE SCLEROSIS: Primary | ICD-10-CM

## 2018-01-04 DIAGNOSIS — I47.10 PAROXYSMAL SUPRAVENTRICULAR TACHYCARDIA (H): ICD-10-CM

## 2018-01-04 LAB
ABO + RH BLD: NORMAL
ABO + RH BLD: NORMAL
ANION GAP SERPL CALCULATED.3IONS-SCNC: 8 MMOL/L (ref 3–14)
BLD GP AB SCN SERPL QL: NORMAL
BLD PROD TYP BPU: NORMAL
BLOOD BANK CMNT PATIENT-IMP: NORMAL
BUN SERPL-MCNC: 22 MG/DL (ref 7–30)
CALCIUM SERPL-MCNC: 8.4 MG/DL (ref 8.5–10.1)
CHLORIDE SERPL-SCNC: 103 MMOL/L (ref 94–109)
CO2 SERPL-SCNC: 26 MMOL/L (ref 20–32)
CREAT SERPL-MCNC: 1.08 MG/DL (ref 0.66–1.25)
ERYTHROCYTE [DISTWIDTH] IN BLOOD BY AUTOMATED COUNT: 12.7 % (ref 10–15)
GFR SERPL CREATININE-BSD FRML MDRD: 68 ML/MIN/1.7M2
GLUCOSE BLDC GLUCOMTR-MCNC: 105 MG/DL (ref 70–99)
GLUCOSE BLDC GLUCOMTR-MCNC: 142 MG/DL (ref 70–99)
GLUCOSE SERPL-MCNC: 128 MG/DL (ref 70–99)
HCT VFR BLD AUTO: 47.5 % (ref 40–53)
HGB BLD-MCNC: 16.6 G/DL (ref 13.3–17.7)
INR PPP: 3.99 (ref 0.86–1.14)
INTERPRETATION ECG - MUSE: NORMAL
KCT BLD-ACNC: 200 SEC (ref 105–167)
KCT BLD-ACNC: 204 SEC (ref 105–167)
KCT BLD-ACNC: 306 SEC (ref 105–167)
KCT BLD-ACNC: 330 SEC (ref 105–167)
KCT BLD-ACNC: 343 SEC (ref 105–167)
KCT BLD-ACNC: 355 SEC (ref 105–167)
KCT BLD-ACNC: 371 SEC (ref 105–167)
MCH RBC QN AUTO: 33.2 PG (ref 26.5–33)
MCHC RBC AUTO-ENTMCNC: 34.9 G/DL (ref 31.5–36.5)
MCV RBC AUTO: 95 FL (ref 78–100)
NUM BPU REQUESTED: 1
PLATELET # BLD AUTO: 114 10E9/L (ref 150–450)
POTASSIUM SERPL-SCNC: 3.7 MMOL/L (ref 3.4–5.3)
RBC # BLD AUTO: 5 10E12/L (ref 4.4–5.9)
SODIUM SERPL-SCNC: 137 MMOL/L (ref 133–144)
SPECIMEN EXP DATE BLD: NORMAL
WBC # BLD AUTO: 9.6 10E9/L (ref 4–11)

## 2018-01-04 PROCEDURE — C1732 CATH, EP, DIAG/ABL, 3D/VECT: HCPCS

## 2018-01-04 PROCEDURE — 85610 PROTHROMBIN TIME: CPT | Performed by: INTERNAL MEDICINE

## 2018-01-04 PROCEDURE — 85027 COMPLETE CBC AUTOMATED: CPT | Performed by: INTERNAL MEDICINE

## 2018-01-04 PROCEDURE — 27210946 ZZH KIT HC TOTES DISP CR8

## 2018-01-04 PROCEDURE — 85347 COAGULATION TIME ACTIVATED: CPT

## 2018-01-04 PROCEDURE — 27210856 ZZH ACCESS HEART CATH CR2

## 2018-01-04 PROCEDURE — 93613 INTRACARDIAC EPHYS 3D MAPG: CPT

## 2018-01-04 PROCEDURE — 82962 GLUCOSE BLOOD TEST: CPT | Mod: 91

## 2018-01-04 PROCEDURE — 37000009 ZZH ANESTHESIA TECHNICAL FEE, EACH ADDTL 15 MIN

## 2018-01-04 PROCEDURE — 25000128 H RX IP 250 OP 636: Performed by: NURSE ANESTHETIST, CERTIFIED REGISTERED

## 2018-01-04 PROCEDURE — 40000275 ZZH STATISTIC RCP TIME EA 10 MIN

## 2018-01-04 PROCEDURE — 93623 PRGRMD STIMJ&PACG IV RX NFS: CPT

## 2018-01-04 PROCEDURE — C1731 CATH, EP, 20 OR MORE ELEC: HCPCS

## 2018-01-04 PROCEDURE — 93656 COMPRE EP EVAL ABLTJ ATR FIB: CPT | Mod: GC | Performed by: INTERNAL MEDICINE

## 2018-01-04 PROCEDURE — 27210796 ZZH PAD EXTRNAL REFRENCE CARDIAC MAPPING CR14

## 2018-01-04 PROCEDURE — 36415 COLL VENOUS BLD VENIPUNCTURE: CPT | Performed by: INTERNAL MEDICINE

## 2018-01-04 PROCEDURE — 40000065 ZZH STATISTIC EKG NON-CHARGEABLE

## 2018-01-04 PROCEDURE — 27210807 ZZH SHEATH CR6

## 2018-01-04 PROCEDURE — C9399 UNCLASSIFIED DRUGS OR BIOLOG: HCPCS | Performed by: NURSE ANESTHETIST, CERTIFIED REGISTERED

## 2018-01-04 PROCEDURE — C1894 INTRO/SHEATH, NON-LASER: HCPCS

## 2018-01-04 PROCEDURE — 86922 COMPATIBILITY TEST ANTIGLOB: CPT | Performed by: INTERNAL MEDICINE

## 2018-01-04 PROCEDURE — 25000125 ZZHC RX 250: Performed by: NURSE ANESTHETIST, CERTIFIED REGISTERED

## 2018-01-04 PROCEDURE — 80048 BASIC METABOLIC PNL TOTAL CA: CPT | Performed by: INTERNAL MEDICINE

## 2018-01-04 PROCEDURE — C1730 CATH, EP, 19 OR FEW ELECT: HCPCS

## 2018-01-04 PROCEDURE — A9270 NON-COVERED ITEM OR SERVICE: HCPCS | Mod: GY | Performed by: NURSE PRACTITIONER

## 2018-01-04 PROCEDURE — 93655 ICAR CATH ABLTJ DSCRT ARRHYT: CPT | Mod: GC | Performed by: INTERNAL MEDICINE

## 2018-01-04 PROCEDURE — 25000128 H RX IP 250 OP 636: Performed by: ANESTHESIOLOGY

## 2018-01-04 PROCEDURE — 37000008 ZZH ANESTHESIA TECHNICAL FEE, 1ST 30 MIN

## 2018-01-04 PROCEDURE — 27210789 ZZH NEEDLE BRK TRANSEPTAL CR12

## 2018-01-04 PROCEDURE — 93623 PRGRMD STIMJ&PACG IV RX NFS: CPT | Mod: 26 | Performed by: INTERNAL MEDICINE

## 2018-01-04 PROCEDURE — 71000014 ZZH RECOVERY PHASE 1 LEVEL 2 FIRST HR

## 2018-01-04 PROCEDURE — 93613 INTRACARDIAC EPHYS 3D MAPG: CPT | Mod: GC | Performed by: INTERNAL MEDICINE

## 2018-01-04 PROCEDURE — 25000132 ZZH RX MED GY IP 250 OP 250 PS 637: Performed by: STUDENT IN AN ORGANIZED HEALTH CARE EDUCATION/TRAINING PROGRAM

## 2018-01-04 PROCEDURE — 25000132 ZZH RX MED GY IP 250 OP 250 PS 637: Mod: GY | Performed by: NURSE PRACTITIONER

## 2018-01-04 PROCEDURE — 93662 INTRACARDIAC ECG (ICE): CPT

## 2018-01-04 PROCEDURE — C1759 CATH, INTRA ECHOCARDIOGRAPHY: HCPCS

## 2018-01-04 PROCEDURE — 40000170 ZZH STATISTIC PRE-PROCEDURE ASSESSMENT II

## 2018-01-04 PROCEDURE — 93662 INTRACARDIAC ECG (ICE): CPT | Mod: 26 | Performed by: INTERNAL MEDICINE

## 2018-01-04 PROCEDURE — 27210901 ZZH ACCESS EP PROC CR7

## 2018-01-04 PROCEDURE — 86901 BLOOD TYPING SEROLOGIC RH(D): CPT | Performed by: INTERNAL MEDICINE

## 2018-01-04 PROCEDURE — 93010 ELECTROCARDIOGRAM REPORT: CPT | Performed by: INTERNAL MEDICINE

## 2018-01-04 PROCEDURE — 93005 ELECTROCARDIOGRAM TRACING: CPT

## 2018-01-04 PROCEDURE — A9270 NON-COVERED ITEM OR SERVICE: HCPCS | Performed by: STUDENT IN AN ORGANIZED HEALTH CARE EDUCATION/TRAINING PROGRAM

## 2018-01-04 PROCEDURE — 86850 RBC ANTIBODY SCREEN: CPT | Performed by: INTERNAL MEDICINE

## 2018-01-04 PROCEDURE — 25000128 H RX IP 250 OP 636: Performed by: INTERNAL MEDICINE

## 2018-01-04 PROCEDURE — 27210795 ZZH PAD DEFIB QUICK CR4

## 2018-01-04 PROCEDURE — 40000014 ZZH STATISTIC ARTERIAL MONITORING DAILY

## 2018-01-04 PROCEDURE — 25000125 ZZHC RX 250: Performed by: INTERNAL MEDICINE

## 2018-01-04 PROCEDURE — 86900 BLOOD TYPING SEROLOGIC ABO: CPT | Performed by: INTERNAL MEDICINE

## 2018-01-04 PROCEDURE — 27210841 ZZH MANIFOLD CR5

## 2018-01-04 PROCEDURE — 93656 COMPRE EP EVAL ABLTJ ATR FIB: CPT

## 2018-01-04 PROCEDURE — 25000565 ZZH ISOFLURANE, EA 15 MIN

## 2018-01-04 PROCEDURE — 93655 ICAR CATH ABLTJ DSCRT ARRHYT: CPT

## 2018-01-04 PROCEDURE — 86902 BLOOD TYPE ANTIGEN DONOR EA: CPT | Performed by: INTERNAL MEDICINE

## 2018-01-04 PROCEDURE — C1893 INTRO/SHEATH, FIXED,NON-PEEL: HCPCS

## 2018-01-04 RX ORDER — FENTANYL CITRATE 50 UG/ML
25-50 INJECTION, SOLUTION INTRAMUSCULAR; INTRAVENOUS
Status: DISCONTINUED | OUTPATIENT
Start: 2018-01-04 | End: 2018-01-04 | Stop reason: HOSPADM

## 2018-01-04 RX ORDER — PROPOFOL 10 MG/ML
INJECTION, EMULSION INTRAVENOUS PRN
Status: DISCONTINUED | OUTPATIENT
Start: 2018-01-04 | End: 2018-01-04

## 2018-01-04 RX ORDER — PANTOPRAZOLE SODIUM 20 MG/1
20 TABLET, DELAYED RELEASE ORAL AT BEDTIME
Status: DISCONTINUED | OUTPATIENT
Start: 2018-01-04 | End: 2018-01-05 | Stop reason: HOSPADM

## 2018-01-04 RX ORDER — FLUMAZENIL 0.1 MG/ML
0.2 INJECTION, SOLUTION INTRAVENOUS
Status: DISCONTINUED | OUTPATIENT
Start: 2018-01-04 | End: 2018-01-04 | Stop reason: HOSPADM

## 2018-01-04 RX ORDER — ONDANSETRON 4 MG/1
4 TABLET, ORALLY DISINTEGRATING ORAL EVERY 30 MIN PRN
Status: DISCONTINUED | OUTPATIENT
Start: 2018-01-04 | End: 2018-01-04 | Stop reason: HOSPADM

## 2018-01-04 RX ORDER — NALOXONE HYDROCHLORIDE 0.4 MG/ML
0.4 INJECTION, SOLUTION INTRAMUSCULAR; INTRAVENOUS; SUBCUTANEOUS EVERY 5 MIN PRN
Status: DISCONTINUED | OUTPATIENT
Start: 2018-01-04 | End: 2018-01-04 | Stop reason: HOSPADM

## 2018-01-04 RX ORDER — SODIUM CHLORIDE 9 MG/ML
INJECTION, SOLUTION INTRAVENOUS CONTINUOUS PRN
Status: DISCONTINUED | OUTPATIENT
Start: 2018-01-04 | End: 2018-01-04

## 2018-01-04 RX ORDER — KETOROLAC TROMETHAMINE 30 MG/ML
15 INJECTION, SOLUTION INTRAMUSCULAR; INTRAVENOUS
Status: DISCONTINUED | OUTPATIENT
Start: 2018-01-04 | End: 2018-01-04 | Stop reason: HOSPADM

## 2018-01-04 RX ORDER — SODIUM CHLORIDE, SODIUM LACTATE, POTASSIUM CHLORIDE, CALCIUM CHLORIDE 600; 310; 30; 20 MG/100ML; MG/100ML; MG/100ML; MG/100ML
INJECTION, SOLUTION INTRAVENOUS CONTINUOUS
Status: DISCONTINUED | OUTPATIENT
Start: 2018-01-04 | End: 2018-01-04 | Stop reason: HOSPADM

## 2018-01-04 RX ORDER — ADENOSINE 3 MG/ML
6-12 INJECTION, SOLUTION INTRAVENOUS EVERY 5 MIN PRN
Status: DISCONTINUED | OUTPATIENT
Start: 2018-01-04 | End: 2018-01-04 | Stop reason: HOSPADM

## 2018-01-04 RX ORDER — FENTANYL CITRATE 50 UG/ML
INJECTION, SOLUTION INTRAMUSCULAR; INTRAVENOUS PRN
Status: DISCONTINUED | OUTPATIENT
Start: 2018-01-04 | End: 2018-01-04

## 2018-01-04 RX ORDER — METOPROLOL TARTRATE 50 MG
50 TABLET ORAL 2 TIMES DAILY
Status: CANCELLED | OUTPATIENT
Start: 2018-01-04

## 2018-01-04 RX ORDER — DOBUTAMINE HYDROCHLORIDE 200 MG/100ML
5-40 INJECTION INTRAVENOUS CONTINUOUS PRN
Status: DISCONTINUED | OUTPATIENT
Start: 2018-01-04 | End: 2018-01-04 | Stop reason: HOSPADM

## 2018-01-04 RX ORDER — WARFARIN SODIUM 5 MG/1
5 TABLET ORAL
Status: CANCELLED | OUTPATIENT
Start: 2018-01-04

## 2018-01-04 RX ORDER — NALOXONE HYDROCHLORIDE 0.4 MG/ML
.1-.4 INJECTION, SOLUTION INTRAMUSCULAR; INTRAVENOUS; SUBCUTANEOUS
Status: DISCONTINUED | OUTPATIENT
Start: 2018-01-04 | End: 2018-01-04

## 2018-01-04 RX ORDER — LIDOCAINE 40 MG/G
CREAM TOPICAL
Status: DISCONTINUED | OUTPATIENT
Start: 2018-01-04 | End: 2018-01-04 | Stop reason: HOSPADM

## 2018-01-04 RX ORDER — HEPARIN SODIUM 1000 [USP'U]/ML
1000-10000 INJECTION, SOLUTION INTRAVENOUS; SUBCUTANEOUS EVERY 5 MIN PRN
Status: DISCONTINUED | OUTPATIENT
Start: 2018-01-04 | End: 2018-01-04 | Stop reason: HOSPADM

## 2018-01-04 RX ORDER — ONDANSETRON 2 MG/ML
4 INJECTION INTRAMUSCULAR; INTRAVENOUS EVERY 4 HOURS PRN
Status: DISCONTINUED | OUTPATIENT
Start: 2018-01-04 | End: 2018-01-04 | Stop reason: HOSPADM

## 2018-01-04 RX ORDER — HEPARIN SODIUM 1000 [USP'U]/ML
INJECTION, SOLUTION INTRAVENOUS; SUBCUTANEOUS PRN
Status: DISCONTINUED | OUTPATIENT
Start: 2018-01-04 | End: 2018-01-04

## 2018-01-04 RX ORDER — DIPHENHYDRAMINE HYDROCHLORIDE 50 MG/ML
25-50 INJECTION INTRAMUSCULAR; INTRAVENOUS
Status: DISCONTINUED | OUTPATIENT
Start: 2018-01-04 | End: 2018-01-04 | Stop reason: HOSPADM

## 2018-01-04 RX ORDER — PROMETHAZINE HYDROCHLORIDE 25 MG/ML
6.25-25 INJECTION, SOLUTION INTRAMUSCULAR; INTRAVENOUS EVERY 4 HOURS PRN
Status: DISCONTINUED | OUTPATIENT
Start: 2018-01-04 | End: 2018-01-04 | Stop reason: HOSPADM

## 2018-01-04 RX ORDER — LORAZEPAM 2 MG/ML
.5-2 INJECTION INTRAMUSCULAR EVERY 10 MIN PRN
Status: DISCONTINUED | OUTPATIENT
Start: 2018-01-04 | End: 2018-01-04 | Stop reason: HOSPADM

## 2018-01-04 RX ORDER — OXYCODONE AND ACETAMINOPHEN 5; 325 MG/1; MG/1
1-2 TABLET ORAL EVERY 4 HOURS PRN
Status: DISCONTINUED | OUTPATIENT
Start: 2018-01-04 | End: 2018-01-05 | Stop reason: HOSPADM

## 2018-01-04 RX ORDER — LIDOCAINE 40 MG/G
CREAM TOPICAL
Status: DISCONTINUED | OUTPATIENT
Start: 2018-01-04 | End: 2018-01-05 | Stop reason: HOSPADM

## 2018-01-04 RX ORDER — SIMVASTATIN 10 MG
10 TABLET ORAL AT BEDTIME
Status: DISCONTINUED | OUTPATIENT
Start: 2018-01-04 | End: 2018-01-05 | Stop reason: HOSPADM

## 2018-01-04 RX ORDER — ATROPINE SULFATE 0.1 MG/ML
.5-1 INJECTION INTRAVENOUS
Status: DISCONTINUED | OUTPATIENT
Start: 2018-01-04 | End: 2018-01-04 | Stop reason: HOSPADM

## 2018-01-04 RX ORDER — NALOXONE HYDROCHLORIDE 0.4 MG/ML
.1-.4 INJECTION, SOLUTION INTRAMUSCULAR; INTRAVENOUS; SUBCUTANEOUS
Status: DISCONTINUED | OUTPATIENT
Start: 2018-01-04 | End: 2018-01-05 | Stop reason: HOSPADM

## 2018-01-04 RX ORDER — PROTAMINE SULFATE 10 MG/ML
1-5 INJECTION, SOLUTION INTRAVENOUS
Status: DISCONTINUED | OUTPATIENT
Start: 2018-01-04 | End: 2018-01-04 | Stop reason: HOSPADM

## 2018-01-04 RX ORDER — ONDANSETRON 2 MG/ML
INJECTION INTRAMUSCULAR; INTRAVENOUS PRN
Status: DISCONTINUED | OUTPATIENT
Start: 2018-01-04 | End: 2018-01-04

## 2018-01-04 RX ORDER — PROTAMINE SULFATE 10 MG/ML
INJECTION, SOLUTION INTRAVENOUS PRN
Status: DISCONTINUED | OUTPATIENT
Start: 2018-01-04 | End: 2018-01-04

## 2018-01-04 RX ORDER — PROTAMINE SULFATE 10 MG/ML
5-40 INJECTION, SOLUTION INTRAVENOUS EVERY 10 MIN PRN
Status: DISCONTINUED | OUTPATIENT
Start: 2018-01-04 | End: 2018-01-04 | Stop reason: HOSPADM

## 2018-01-04 RX ORDER — FUROSEMIDE 10 MG/ML
20-100 INJECTION INTRAMUSCULAR; INTRAVENOUS
Status: DISCONTINUED | OUTPATIENT
Start: 2018-01-04 | End: 2018-01-04 | Stop reason: HOSPADM

## 2018-01-04 RX ORDER — ONDANSETRON 2 MG/ML
4 INJECTION INTRAMUSCULAR; INTRAVENOUS EVERY 30 MIN PRN
Status: DISCONTINUED | OUTPATIENT
Start: 2018-01-04 | End: 2018-01-04 | Stop reason: HOSPADM

## 2018-01-04 RX ADMIN — ROCURONIUM BROMIDE 20 MG: 10 INJECTION INTRAVENOUS at 10:34

## 2018-01-04 RX ADMIN — NOREPINEPHRINE BITARTRATE 6.4 MCG: 1 INJECTION INTRAVENOUS at 10:31

## 2018-01-04 RX ADMIN — ROCURONIUM BROMIDE 20 MG: 10 INJECTION INTRAVENOUS at 10:23

## 2018-01-04 RX ADMIN — PROTAMINE SULFATE 15 MG: 10 INJECTION, SOLUTION INTRAVENOUS at 15:24

## 2018-01-04 RX ADMIN — ROCURONIUM BROMIDE 20 MG: 10 INJECTION INTRAVENOUS at 13:00

## 2018-01-04 RX ADMIN — FENTANYL CITRATE 100 MCG: 50 INJECTION, SOLUTION INTRAMUSCULAR; INTRAVENOUS at 08:51

## 2018-01-04 RX ADMIN — PHENYLEPHRINE HYDROCHLORIDE 200 MCG: 10 INJECTION, SOLUTION INTRAMUSCULAR; INTRAVENOUS; SUBCUTANEOUS at 09:06

## 2018-01-04 RX ADMIN — PHENYLEPHRINE HYDROCHLORIDE 200 MCG: 10 INJECTION, SOLUTION INTRAMUSCULAR; INTRAVENOUS; SUBCUTANEOUS at 09:21

## 2018-01-04 RX ADMIN — SOTALOL HYDROCHLORIDE 40 MG: 80 TABLET ORAL at 22:04

## 2018-01-04 RX ADMIN — SUGAMMADEX 200 MG: 100 INJECTION, SOLUTION INTRAVENOUS at 15:16

## 2018-01-04 RX ADMIN — PHENYLEPHRINE HYDROCHLORIDE 100 MCG: 10 INJECTION, SOLUTION INTRAMUSCULAR; INTRAVENOUS; SUBCUTANEOUS at 10:31

## 2018-01-04 RX ADMIN — ROCURONIUM BROMIDE 50 MG: 10 INJECTION INTRAVENOUS at 08:55

## 2018-01-04 RX ADMIN — Medication 1 MCG/MIN: at 10:17

## 2018-01-04 RX ADMIN — NOREPINEPHRINE BITARTRATE 6.4 MCG: 1 INJECTION INTRAVENOUS at 10:33

## 2018-01-04 RX ADMIN — ROCURONIUM BROMIDE 20 MG: 10 INJECTION INTRAVENOUS at 12:25

## 2018-01-04 RX ADMIN — OXYCODONE HYDROCHLORIDE AND ACETAMINOPHEN 1 TABLET: 5; 325 TABLET ORAL at 18:48

## 2018-01-04 RX ADMIN — NOREPINEPHRINE BITARTRATE 6.4 MCG: 1 INJECTION INTRAVENOUS at 10:29

## 2018-01-04 RX ADMIN — PROTAMINE SULFATE 50 MG: 10 INJECTION, SOLUTION INTRAVENOUS at 15:06

## 2018-01-04 RX ADMIN — PROPOFOL 90 MG: 10 INJECTION, EMULSION INTRAVENOUS at 08:53

## 2018-01-04 RX ADMIN — PANTOPRAZOLE SODIUM 20 MG: 20 TABLET, DELAYED RELEASE ORAL at 22:04

## 2018-01-04 RX ADMIN — MIDAZOLAM 2 MG: 1 INJECTION INTRAMUSCULAR; INTRAVENOUS at 08:33

## 2018-01-04 RX ADMIN — SODIUM CHLORIDE: 9 INJECTION, SOLUTION INTRAVENOUS at 10:35

## 2018-01-04 RX ADMIN — ROCURONIUM BROMIDE 30 MG: 10 INJECTION INTRAVENOUS at 11:15

## 2018-01-04 RX ADMIN — HEPARIN SODIUM 10000 UNITS: 1000 INJECTION, SOLUTION INTRAVENOUS; SUBCUTANEOUS at 10:40

## 2018-01-04 RX ADMIN — PHENYLEPHRINE HYDROCHLORIDE 200 MCG: 10 INJECTION, SOLUTION INTRAMUSCULAR; INTRAVENOUS; SUBCUTANEOUS at 09:10

## 2018-01-04 RX ADMIN — PHENYLEPHRINE HYDROCHLORIDE 100 MCG: 10 INJECTION, SOLUTION INTRAMUSCULAR; INTRAVENOUS; SUBCUTANEOUS at 10:28

## 2018-01-04 RX ADMIN — ONDANSETRON 4 MG: 2 INJECTION INTRAMUSCULAR; INTRAVENOUS at 15:15

## 2018-01-04 RX ADMIN — HEPARIN SODIUM 1400 UNITS/HR: 10000 INJECTION, SOLUTION INTRAVENOUS at 11:04

## 2018-01-04 RX ADMIN — NOREPINEPHRINE BITARTRATE 12.8 MCG: 1 INJECTION INTRAVENOUS at 09:19

## 2018-01-04 RX ADMIN — PHENYLEPHRINE HYDROCHLORIDE 0.5 MCG/KG/MIN: 10 INJECTION, SOLUTION INTRAMUSCULAR; INTRAVENOUS; SUBCUTANEOUS at 09:18

## 2018-01-04 RX ADMIN — PHENYLEPHRINE HYDROCHLORIDE 200 MCG: 10 INJECTION, SOLUTION INTRAMUSCULAR; INTRAVENOUS; SUBCUTANEOUS at 13:15

## 2018-01-04 RX ADMIN — SIMVASTATIN 10 MG: 10 TABLET, FILM COATED ORAL at 22:04

## 2018-01-04 RX ADMIN — ROCURONIUM BROMIDE 30 MG: 10 INJECTION INTRAVENOUS at 09:46

## 2018-01-04 RX ADMIN — SODIUM CHLORIDE, POTASSIUM CHLORIDE, SODIUM LACTATE AND CALCIUM CHLORIDE: 600; 310; 30; 20 INJECTION, SOLUTION INTRAVENOUS at 08:52

## 2018-01-04 RX ADMIN — SODIUM CHLORIDE, POTASSIUM CHLORIDE, SODIUM LACTATE AND CALCIUM CHLORIDE: 600; 310; 30; 20 INJECTION, SOLUTION INTRAVENOUS at 08:43

## 2018-01-04 ASSESSMENT — PAIN DESCRIPTION - DESCRIPTORS
DESCRIPTORS: ACHING;SORE
DESCRIPTORS: ACHING

## 2018-01-04 NOTE — IP AVS SNAPSHOT
MRN:[id number]                      After Visit Summary   1/4/2018    Haresh Rock    MRN: [id number]           Thank you!     Thank you for choosing [facility] for your care. Our goal is always to provide you with excellent care. Hearing back from our patients is one way we can continue to improve our services. Please take a few minutes to complete the written survey that you may receive in the mail after you visit with us. Thank you!        Patient Information     Date Of Birth          1950        Designated Caregiver       Most Recent Value    Caregiver    Will someone help with your care after discharge? no      About your hospital stay     You were admitted on:  January 4, 2018 You last received care in the:  Unit 6D Observation [facility]    You were discharged on:  January 5, 2018       Who to Call     For medical emergencies, please call 911.  For non-urgent questions about your medical care, please call your primary care provider or clinic, [phone]  For questions related to your surgery, please call your surgery clinic        Attending Provider     Provider Specialty    Bekah Matt MD Cardiology       Primary Care Provider Office Phone # Fax #    Anya Nowak [phone]      Your next 10 appointments already scheduled     Feb 05, 2018 10:00 AM CST   Return Visit with DEJAH Alegre CNP   [facility] PHYSICIANS HEART AT [facility] ([facility])    [address] [place] [address]   [phone]            Apr 09, 2018  1:40 PM CDT   Return Visit with Bekah Matt MD   [facility] PHYSICIANS HEART AT [facility] ([facility])    [address] [place] [address]   [phone]            May 22, 2018 10:00 AM CDT   [facility] Draw with  [facility] DRAW   [facility] Lab Draw ([facility])    [address] [place] [address]  [address]  [facility] [address]   [phone]            May 22, 2018 10:30 AM CDT   Return with Augusto Miller MD   [facility] Blood and Marrow Transplant ([facility] [place])    [address] [facility]  [address]  [facility] [address]   [phone]              Future tests that were ordered for you     Echocardiogram Complete       Administration of IV contrast will be tailored to this examination per the appropriate written protocol listed in the Echocardiography department Protocol Book, or by the supervising Cardiologist. This may result in an order change.    Use of contrast is at the discretion of the supervising Cardiologist.                  Further instructions from your care team           Plan:    Stop Metoprolol    Continue other medications without changes    Follow up with Dr. Matt in 3 months with heart monitor completed prior    Care of groin site:         Remove the Band-Aid after 24 hours. If there is minor oozing, apply another Band-aid and remove it after 12 hours.          Do NOT take a bath, use a hot tub, pool, or submerse in water for at least 3 days You may shower.          It is normal to have a small bruise or lump at the site.         Do not scrub the site.         Do not use lotion or powder near the puncture site for 3 days.         For the first 2 days: Do not stoop or squat. When you cough, sneeze or move your bowels, hold your hand over the puncture site and press gently.         Do not lift more than 10 pounds or exertional activity for 10 days.      If you start bleeding from the site in your groin:  Lie down flat and press firmly on the site.  Call your physician immediately, or, come to the emergency room.    Call 911 right away if you have bleeding that is heavy or does not stop.     Call your doctor/provider if:         You have a large or growing hard lump around the site.         The site is red, swollen, hot or tender.         Blood or fluid is  draining from the site.         You have chills or a fever greater than 101 F (38 C).         Your leg or arm turns bluish, feels numb or cool.         You have hives, a rash or unusual itching.     Cardiovascular Clinic:   [address] [place] [address]. [place] [address]  Your Care Team:  [facility] Cardiology   Telephone Number     Tatiana Matt [phone]   Tina Barnes RN  [phone]     For scheduling appts or procedures:    Natali [facility]   [phone]     As always, Thank you for trusting us with your health care needs          Pending Results     Date and Time Order Name Status Description    1/4/2018 1616 EKG 12-lead, tracing only Preliminary     1/3/2018 1231 Radiologist Consult For Cardiology In process             Statement of Approval     Ordered          01/05/18 0949  I have reviewed and agree with all the recommendations and orders detailed in this document.  EFFECTIVE NOW     Approved and electronically signed by:  Tatiana Mcgee APRN CNP             Admission Information     Date & Time Provider Department Dept. Phone    1/4/2018 Bekah Matt MD Unit 6D Observation [facility] [phone]      Your Vitals Were     Blood Pressure Temperature Respirations Height Weight Pulse Oximetry    129/68 98.6  F (37  C) (Oral) 16 1.829 m (6') 93.7 kg (206 lb 9.1 oz) 93%    BMI (Body Mass Index)                   28.02 kg/m2           MyChart Information     [employer] gives you secure access to your electronic health record. If you see a primary care provider, you can also send messages to your care team and make appointments. If you have questions, please call your primary care clinic.  If you do not have a primary care provider, please call [phone] and they will assist you.        Care EveryWhere ID     This is your Care EveryWhere ID. This could be used by other organizations to access your [place] medical records  [id number]        Equal Access to Services      JOSELINE MONGE : Hadii aad ku pawan Ann, waaxda luqadaha, qaybta kaalmada adehelen, priya debbie hayanila puente jackimiguel angel baron . So [facility] [phone].    ATENCIÓN: Si habla tino, tiene a barger disposición servicios gratuitos de asistencia lingüística. Llame al [phone].    We comply with applicable federal civil rights laws and [place] laws. We do not discriminate on the basis of race, color, national origin, age, disability, sex, sexual orientation, or gender identity.               Review of your medicines      CONTINUE these medicines which may have CHANGED, or have new prescriptions. If we are uncertain of the size of tablets/capsules you have at home, strength may be listed as something that might have changed.        Dose / Directions    pantoprazole 20 MG EC tablet   Commonly known as:  PROTONIX   This may have changed:  when to take this   Used for:  Zkwuv-yamyaw-hgvp disease, chronic (H), Hodgkin's disease of extranodal or solid organ site (H)        Dose:  20 mg   Take 1 tablet (20 mg) by mouth daily   Quantity:  90 tablet   Refills:  3         CONTINUE these medicines which have NOT CHANGED        Dose / Directions    ARTIFICIAL TEAR SOLUTION OP        Apply  to eye. USE AS DIRECTED(DF)   Refills:  0       CENTRUM PO        1 TABLET DAILY   Refills:  0       folic acid 1 MG tablet   Commonly known as:  FOLVITE   Used for:  Pjbuj-iykpkr-qpik disease, chronic (H), Lymphoma (H)        Dose:  1000 mcg   Take 1 tablet (1,000 mcg) by mouth daily   Quantity:  90 tablet   Refills:  3       NYQUIL PO        Take by mouth as needed   Refills:  0       PREVIDENT 5000 DRY MOUTH 1.1 % Gel topical gel   Used for:  Hodgkin's disease of extranodal or solid organ site (H), Status post bone marrow transplant (H), Hereditary hemochromatosis (H), Thyroid nodule   Generic drug:  sodium fluoride dental gel        Take by mouth daily   Refills:  3       simvastatin 10 MG tablet   Commonly known as:  ZOCOR   Used for:   Hyperlipidemia with target LDL less than 100        TAKE 1 TABLET (10 MG) BY MOUTH AT BEDTIME   Quantity:  30 tablet   Refills:  0       sotalol 80 MG tablet   Commonly known as:  BETAPACE   Used for:  Atrial fibrillation (H)        Dose:  40 mg   Take 0.5 tablets (40 mg) by mouth 2 times daily Follow up visit needed with Dr Matt   Quantity:  90 tablet   Refills:  1       warfarin 5 MG tablet   Commonly known as:  COUMADIN   Used for:  Atrial fibrillation, unspecified type (H), Antiphospholipid antibody syndrome (H)        5 mg on Tue, Thu; 2.5 mg all other days or as directed by coumadin clinic   Quantity:  60 tablet   Refills:  5         STOP taking     metoprolol 25 MG tablet   Commonly known as:  LOPRESSOR                    Protect others around you: Learn how to safely use, store and throw away your medicines at www.disposemymeds.org.             Medication List: This is a list of all your medications and when to take them. Check marks below indicate your daily home schedule. Keep this list as a reference.      Medications           Morning Afternoon Evening Bedtime As Needed    ARTIFICIAL TEAR SOLUTION OP   Apply  to eye. USE AS DIRECTED(DF)                                CENTRUM PO   1 TABLET DAILY                                folic acid 1 MG tablet   Commonly known as:  FOLVITE   Take 1 tablet (1,000 mcg) by mouth daily                                NYQUIL PO   Take by mouth as needed                                pantoprazole 20 MG EC tablet   Commonly known as:  PROTONIX   Take 1 tablet (20 mg) by mouth daily   Last time this was given:  20 mg on 1/4/2018 10:04 PM                                PREVIDENT 5000 DRY MOUTH 1.1 % Gel topical gel   Take by mouth daily   Generic drug:  sodium fluoride dental gel                                simvastatin 10 MG tablet   Commonly known as:  ZOCOR   TAKE 1 TABLET (10 MG) BY MOUTH AT BEDTIME   Last time this was given:  10 mg on 1/4/2018 10:04 PM                                 sotalol 80 MG tablet   Commonly known as:  BETAPACE   Take 0.5 tablets (40 mg) by mouth 2 times daily Follow up visit needed with Dr Matt   Last time this was given:  40 mg on 1/5/2018  7:57 AM                                warfarin 5 MG tablet   Commonly known as:  COUMADIN   5 mg on Tue, Thu; 2.5 mg all other days or as directed by coumadin clinic

## 2018-01-04 NOTE — IP AVS SNAPSHOT
Unit 6D Observation [address] [address]    Phone:  [phone]    Fax:  [phone]                                       After Visit Summary   1/4/2018    Haresh Rock    MRN: [id number]           After Visit Summary Signature Page     I have received my discharge instructions, and my questions have been answered. I have discussed any challenges I see with this plan with the nurse or doctor.    ..........................................................................................................................................  Patient/Patient Representative Signature      ..........................................................................................................................................  Patient Representative Print Name and Relationship to Patient    ..................................................               ................................................  Date                                            Time    ..........................................................................................................................................  Reviewed by Signature/Title    ...................................................              ..............................................  Date                                                            Time

## 2018-01-04 NOTE — ANESTHESIA CARE TRANSFER NOTE
Patient: Haresh Rock    Procedure(s):  Anesthesia out of OR Cath Lab Atrial  Ablation     Diagnosis: supraventricular tachycardia  Diagnosis Additional Information: No value filed.    Anesthesia Type:   No value filed.     Note:  Airway :Face Mask  Patient transferred to:PACU  Comments: Pt to recovery room.  Spontaneous respirations.   Report given to RN.  Handoff Report: Identifed the Patient, Identified the Reponsible Provider, Reviewed the pertinent medical history, Discussed the surgical course, Reviewed Intra-OP anesthesia mangement and issues during anesthesia, Set expectations for post-procedure period and Allowed opportunity for questions and acknowledgement of understanding      Vitals: (Last set prior to Anesthesia Care Transfer)    CRNA VITALS  1/4/2018 1523 - 1/4/2018 1616      1/4/2018             ART BP: 117/65    ART Mean: 86    Resp Rate (set): 10                Electronically Signed By: DEJAH Morales CRNA  January 4, 2018  4:16 PM

## 2018-01-04 NOTE — H&P
H&P from Dr. Matt's Office Visit on 12/4/17 reviewed. Following today's exam there are no interval changes.     INR noted to be 3.99 today, discussed with Dr. Matt and OK to proceed with procedure as planned.     DEJAH Werner CNP  Electrophysiology Consult Service  Pager: [phone]

## 2018-01-04 NOTE — ANESTHESIA PROCEDURE NOTES
Arterial Line Procedure Note  Staff:     Anesthesiologist:  VI MACIAS  Location: In OR After Induction  Procedure Start/Stop Times:     patient identified, IV checked, site marked, risks and benefits discussed, informed consent, monitors and equipment checked, pre-op evaluation and at physician/surgeon's request      Correct Patient: Yes      Correct Position: Yes      Correct Site: Yes      Correct Procedure: Yes      Correct Laterality:  Yes    Site Marked:  Yes  Line Placement:     Procedure:  Arterial Line    Insertion Site:  Radial    Insertion laterality:  Left    Skin Prep: Chloraprep      Patient Prep: patient draped, mask, sterile gloves, hat and hand hygiene      Local skin infiltration:  None    Ultrasound Guided?: No      Catheter size:  20 gauge, 12 cm    Cath secured with: anchor securement device      Dressing:  Tegaderm    Complications:  None obvious    Arterial waveform: Yes      IBP within 10% of NIBP: Yes

## 2018-01-04 NOTE — ANESTHESIA PREPROCEDURE EVALUATION
Anesthesia Plan      History & Physical Review  History and physical reviewed and following examination; no interval change.    ASA Status:  3 .    NPO Status:  > 8 hours    Plan for General with Intravenous induction. Maintenance will be Balanced.      Additional equipment: Arterial Line      Postoperative Care      Consents           Allergies   Allergen Reactions     No Known Drug Allergy      Prescription Medications as of 1/4/2018             metoprolol (LOPRESSOR) 25 MG tablet Take 2 tablets (50 mg) by mouth 2 times daily    simvastatin (ZOCOR) 10 MG tablet TAKE 1 TABLET (10 MG) BY MOUTH AT BEDTIME    sotalol (BETAPACE) 80 MG tablet Take 0.5 tablets (40 mg) by mouth 2 times daily Follow up visit needed with Dr Matt    PREVIDENT 5000 DRY MOUTH 1.1 % GEL topical gel Take by mouth daily    folic acid (FOLVITE) 1 MG tablet Take 1 tablet (1,000 mcg) by mouth daily    pantoprazole (PROTONIX) 20 MG EC tablet Take 1 tablet (20 mg) by mouth daily    warfarin (COUMADIN) 5 MG tablet 5 mg on Tue, Thu; 2.5 mg all other days or as directed by coumadin clinic    Pseudoeph-Doxylamine-DM-APAP (NYQUIL PO) Take by mouth as needed    ARTIFICIAL TEAR SOLUTION OP Apply  to eye. USE AS DIRECTED(DF)    CENTRUM OR 1 TABLET DAILY      Facility Administered Medications as of 1/4/2018             lidocaine (LMX4) kit Apply topically every hour as needed for pain (with VAD insertion or accessing implanted port.)    lidocaine 1 % 1 mL 1 mL by Other route every hour as needed (mild pain with VAD insertion or accessing implanted port)    sodium chloride (PF) 0.9% PF flush 3 mL 3 mLs by Intracatheter route every hour as needed for line flush    sodium chloride (PF) 0.9% PF flush 3 mL 3 mLs by Intracatheter route every 8 hours    lactated ringers infusion Inject into the vein continuous    midazolam (VERSED) injection Inject into the vein as needed for anxiety    fentaNYL (PF) (SUBLIMAZE) injection Inject into the vein as  needed for moderate to severe pain    phenylephrine (BRENDA-SYNEPHRINE) 50 mg in NaCl 0.9 % 250 mL infusion Inject 46..2 mcg/min into the vein continuous    propofol (DIPRIVAN) injection 10 mg/mL vial Inject into the vein as needed    rocuronium (ZEMURON) injection Inject into the vein as needed    No abx ordered pre-op as needed    phenylephrine (BRENDA-SYNEPHRINE) injection 1 mg Inject 1 mg into the vein continuous prn    norepinephrine  bolus 6.4 mcg/mL 64 mcg continuous prn    adenosine (ADENOCARD) injection 6-12 mg Inject 2-4 mLs (6-12 mg) into the vein every 5 minutes as needed (when verbally ordered by provider during procedure.)    atropine injection 0.5-1 mg Inject 5-10 mLs (0.5-1 mg) into the vein q1 min prn for other (when verbally ordered by provider during procedure)    diphenhydrAMINE (BENADRYL) injection 25-50 mg Inject 0.5-1 mLs (25-50 mg) into the vein once as needed for itching (sedation, when verbally ordered by the provider during the procedure)    DOBUTamine 500 mg in dextrose 5% 250 mL (adult std conc) premix Inject 468.5-3,748 mcg/min into the vein continuous prn (Titrate as verbally ordered by the provider during the procedure.)    flumazenil (ROMAZICON) injection 0.2 mg Inject 2 mLs (0.2 mg) into the vein q1 min prn for other (sedation reversal when verbally ordered by provider during the procedure.)    furosemide (LASIX) injection  mg Inject 2-10 mLs ( mg) into the vein once as needed (for diuresis, when verbally ordered by provider during the procedure.)    heparin (porcine) injection 1,000-10,000 Units Inject 1-10 mLs (1,000-10,000 Units) into the vein every 5 minutes as needed for other (bolus dose Up to a max of 25,000 units, when verbally order by provider during procedure.)    heparin infusion 25,000 units in 0.45% NaCl 250 mL Inject 500-2,500 Units/hr into the vein continuous    isoproterenol (ISUPREL) 200 mcg/50 mL pre-mix Inject 0.5-20 mcg/min into the vein continuous  prn (when verbally ordered by the provider during the procedure.)    ketorolac (TORADOL) injection 15 mg Inject 0.5 mLs (15 mg) into the vein once as needed for moderate to severe pain (when verbally ordered by prescriber during the procedure for patients greater or equal to 65 years in age.)    LORazepam (ATIVAN) injection 0.5-2 mg Inject 0.25-1 mLs (0.5-2 mg) into the vein every 10 minutes as needed for anxiety (when verbally ordered by the provider during the procedure)    naloxone (NARCAN) injection 0.4 mg Inject 1 mL (0.4 mg) into the vein every 5 minutes as needed for other (when verbally ordered by provider during the procedure.)    ondansetron (ZOFRAN) injection 4 mg Inject 2 mLs (4 mg) into the vein every 4 hours as needed for nausea (when verbally ordered by provider during procedure)    promethazine (PHENERGAN) IV injection 6.25-25 mg Inject 0.25-1 mLs (6.25-25 mg) into the vein every 4 hours as needed for nausea (when verbally ordered by the provider during the procedure)    protamine injection 1-5 mg Inject 0.1-0.5 mLs (1-5 mg) into the vein once as needed (when verbally ordered by provider during procedure.)    protamine injection 5-40 mg Inject 0.5-4 mLs (5-40 mg) into the vein every 10 minutes as needed (when verbally ordered by provider during procedure.)    fentaNYL (PF) (SUBLIMAZE) injection 25-50 mcg Inject 0.5-1 mLs (25-50 mcg) into the vein every 2 minutes as needed for severe pain (or sedation, when verbally ordered by the provider during the procedure.)    fentaNYL (PF) (SUBLIMAZE) 250 mcg in D5W 100 mL infusion Inject  mcg/hr into the vein continuous prn (sedation/severe pain, when verbally ordered by the provider during the procedure.)    midazolam (VERSED) injection 0.5-2 mg Inject 0.5-2 mLs (0.5-2 mg) into the vein every 2 minutes as needed for sedation (when verbally ordered by the provider during the procedure.)    midazolam (VERSED) 20 mg in D5W 100 mL infusion Inject 0.5-6 mg/hr  into the vein continuous prn (sedation, when verbally ordered by the provider during the procedure.)    heparin 6000 units in 1500 mL NS (4:1 concentration) 1,500 mLs (6,000 Units) by TABLE SOLN route continuous prn (Catheter prep table solution use as directed by provider.)    heparin 2000 units in 500 mL NS (4:1 concentration) 500 mLs (2,000 Units) by Arterial line flush route continuous prn (Administer heparin flush solution through Arterial catheter to flush line as directed by provider.)    heparin 2000 units in 500 mL NS (4:1 concentration) 500 mLs (2,000 Units) by Transeptal route continuous prn (Administer heparin flush solution through Transeptal catheter to flush catheter line as directed by provider.)    0.9% sodium chloride BOLUS 500 mLs by Venous Line flush route once as needed (Administer sodium chloride flush solution through Venous Catheter line  to flush line as directed by provider.)    heparin 1000 units in 1000 mL NS (1:1 concentration) 1,000 mLs (1,000 Units) by Line Irrigation route continuous prn (Administer heparin flush solution through catheter to flush line as directed by provider.)    heparin (porcine) injection as needed for line flush    lidocaine 1 % 1 mL (Discontinued) 1 mL by Other route every hour as needed (mild pain with VAD insertion or accessing implanted port)    lidocaine (LMX4) kit (Discontinued) Apply topically every hour as needed for pain (with VAD insertion or accessing implanted port.)    sodium chloride (PF) 0.9% PF flush 3 mL (Discontinued) 3 mLs by Intracatheter route every hour as needed for line flush    sodium chloride (PF) 0.9% PF flush 3 mL (Discontinued) 3 mLs by Intracatheter route every 8 hours    0.9% sodium chloride infusion (Discontinued) Inject into the vein continuous prn (IF patient does not have IV fluids running prior to the procedure. )    midazolam (VERSED) injection 0.5-1 mg (Discontinued) Inject 0.5-1 mLs (0.5-1 mg) into the vein Once    Linked  "Group 1:  \"Followed by\" Linked Group Details     midazolam (VERSED) injection 0.5 mg (Discontinued) Inject 0.5 mLs (0.5 mg) into the vein every 2 minutes as needed for sedation (when verbally ordered by the prescriber during the procedure)    Linked Group 1:  \"Followed by\" Linked Group Details     fentaNYL (PF) (SUBLIMAZE) injection 25-50 mcg (Discontinued) Inject 0.5-1 mLs (25-50 mcg) into the vein Once    Linked Group 2:  \"Followed by\" Linked Group Details     fentaNYL (PF) (SUBLIMAZE) injection 25 mcg (Discontinued) Inject 0.5 mLs (25 mcg) into the vein every 2 minutes as needed for moderate to severe pain (when verbally ordered by the prescriber during the procedure)    Linked Group 2:  \"Followed by\" Linked Group Details     naloxone (NARCAN) injection 0.1-0.4 mg (Discontinued) Inject 0.25-1 mLs (0.1-0.4 mg) into the vein every 2 minutes as needed for opioid reversal or other (when verbally ordered by the prescriber during the procedure)    flumazenil (ROMAZICON) injection 0.2 mg (Discontinued) Inject 2 mLs (0.2 mg) into the vein q1 min prn for benzodiazepine reversal (over sedation, when verbally ordered by the prescriber during the procedure )        Recent Results (from the past 4320 hour(s))   Echo complete with definity    Narrative    [id number]  [id number]  [id number]  [id number]^REYNA^JANAK           [facility]  [facility] and [address]  [address] [place] [id number]     Name: PAMELA GRANADOS  MRN: [id number]  : 1950  Study Date: 2017 08:30 AM  Age: 67 yrs  Gender: Male  [place] [facility] [place]: , Paroxysmal atrial fibrillation (H)  Ordering Physician: REYNA RAMSAY  Referring Physician: REYNA RAMSAY  Performed By: Donal Sy RDCS     BSA: 2.2 m2  Height: 72 in  Weight: 208 lb  HR: 60  BP: 127/71 mmHg  _____________________________________________________________________________  __   "      Procedure  Echocardiogram with two-dimensional, color and spectral Doppler performed.  Contrast Definity. Definity (NDC #11206-096-63) given intravenously. Patient  was given 5ml mixture of 1.5ml Definity and 8.5ml saline. 5 ml wasted. IV  start location RAC .  _____________________________________________________________________________  __        Interpretation Summary  Normal left ventricular size, thickness, and systolic function. EF 55-60%.  Mild right ventricular dilation with normal function.  Moderate to severe calcification of the aortic valve with mild stenosis and  mild insufficiency. Low flow-low gradient AS should be considered, aortic  valve area is 1.6 cm2 and stroke volume is mildly reduced.  No pericardial effusion.     Compared to the prior study 3/4/2013, the aortic valve appears more calcified.  _____________________________________________________________________________  __        Left Ventricle  Left ventricular size is normal. Left ventricular wall thickness is normal.  Global and regional left ventricular function is normal with an EF of 55-60%.  Normal left ventricular filling for age.     Right Ventricle  Global right ventricular function is normal. Mild right ventricular dilation  is present.     Atria  Both atria appear normal.     Mitral Valve  Mild mitral annular calcification is present. Trace to mild mitral  insufficiency is present.        Aortic Valve  Moderate to severe aortic valve sclerosis is present. Mild aortic  insufficiency is present. The calculated aortic valve are is 1.6 cm^2.     Tricuspid Valve  Mild tricuspid insufficiency is present. Right ventricular systolic pressure  is 27mmHg above the right atrial pressure.     Pulmonic Valve  The pulmonic valve is normal.     Vessels  The aorta root is normal. The inferior vena cava cannot be assessed.     Pericardium  No pericardial effusion is present.        Attestation  I have personally viewed the imaging and agree  with the interpretation and  report as documented by the fellow, Jens Corley, and/or edited by me.  _____________________________________________________________________________  __  MMode/2D Measurements & Calculations  IVSd: 0.92 cm     LVIDd: 4.9 cm  LVIDs: 2.9 cm  LVPWd: 0.71 cm  FS: 40.1 %  EDV(Teich): 111.0 ml  ESV(Teich): 32.7 ml  LV mass(C)d: 133.4 grams  LV mass(C)dI: 61.6 grams/m2  Ao root diam: 3.2 cm  LA dimension: 3.7 cm  asc Aorta Diam: 3.0 cm  LA/Ao: 1.2  LVOT diam: 2.2 cm  LVOT area: 3.8 cm2  LA Volume (BP): 67.0 ml  LA Volume Index (BP): 30.9 ml/m2  RWT: 0.29           Doppler Measurements & Calculations  MV E max smooth: 52.6 cm/sec  MV A max smooth: 48.0 cm/sec  MV E/A: 1.1  MV dec time: 0.14 sec  Ao V2 max: 182.3 cm/sec  Ao max P.0 mmHg  Ao V2 mean: 127.0 cm/sec  Ao mean P.3 mmHg  Ao V2 VTI: 43.6 cm  ELIANE(I,D): 1.6 cm2  ELIANE(V,D): 1.5 cm2  LV V1 max P.2 mmHg  LV V1 max: 74.1 cm/sec  LV V1 VTI: 18.3 cm  CO(LVOT): 4.1 l/min  CI(LVOT): 1.9 l/min/m2  SV(LVOT): 69.5 ml  SI(LVOT): 32.1 ml/m2  PA V2 max: 104.5 cm/sec  PA max P.4 mmHg  PA acc time: 0.07 sec  TR max smooth: 258.8 cm/sec  TR max P.8 mmHg  Pulm Sys Smooth: 33.1 cm/sec  Pulm Lund Smooth: 22.7 cm/sec  Pulm S/D: 1.5  ELIANE Index (cm2/m2): 0.74  E/E' av.7  Lateral E/e': 6.3  Medial E/e': 7.1        _____________________________________________________________________________  __        Report approved by: Del Martinez 2017 10:45 AM        PAST MEDICAL HISTORY:   Past Medical History:   Diagnosis Date     Antiphospholipid antibody syndrome (H)     Ravi's     Atrial fibrillation (H) 2011     Cirrhosis (H)      CKD (chronic kidney disease) stage 1, GFR 90 ml/min or greater      CKD (chronic kidney disease) stage 2, GFR 60-89 ml/min      Diabetes mellitus type II     steroid induced     DJD (degenerative joint disease) of knee     SHAWN, worse on right     DVT (deep venous thrombosis) (H)      ED (erectile dysfunction)       Gallbladder polyp 5/2010     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     Hemochromatosis      Hodgkin's disease NOS     5 cycles of ABVD in 2011     HTN (hypertension)      Hyperlipidaemia LDL goal <100      Myeloproliferative disorder (H)     atypical, [facility]     PE (pulmonary embolism) 11/2004     Polyneuropathy      Primary pulmonary hypertension (H)        PAST SURGICAL HISTORY:   Past Surgical History:   Procedure Laterality Date     ANESTHESIA CARDIOVERSION  4/21/2011    Procedure:ANESTHESIA CARDIOVERSION; Surgeon:GENERIC ANESTHESIA PROVIDER; Location:[facility] OR     ARTHROSCOPY KNEE RT/LT      LT     COLONOSCOPY  10/16/2012    Procedure: COLONOSCOPY;  Colonoscopy, screening;  Surgeon: Reg Feliciano MD;  Location: [facility] OR     H ABLATION FOCAL AFIB  3/5/2013    PVI     HC BONE MARROW/STEM TRANSPLANT ALLOGENIC  5/8/2007     INSERT PORT VASCULAR ACCESS       JOINT REPLACEMTN, KNEE RT/LT  3/28/12    SHAWN     VASECTOMY  1990       FAMILY HISTORY:   Family History   Problem Relation Age of Onset     Hypertension Mother      CEREBROVASCULAR DISEASE Mother      Alzheimer Disease Father      DIABETES Paternal Aunt      GASTROINTESTINAL DISEASE Maternal Grandmother      colitis      Thyroid Disease Sister      CANCER No family hx of      C.A.D. No family hx of      Thyroid Cancer No family hx of        SOCIAL HISTORY:   Social History   Substance Use Topics     Smoking status: Never Smoker     Smokeless tobacco: Never Used     Alcohol use No     Lab Results   Component Value Date    WBC 9.6 01/04/2018    HGB 16.6 01/04/2018    HCT 47.5 01/04/2018     (L) 01/04/2018    CHOL 124 04/18/2016    TRIG 220 (H) 04/18/2016    HDL 27 (L) 04/18/2016    ALT 17 11/17/2017    AST 20 11/17/2017     01/04/2018    BUN 22 01/04/2018    CO2 26 01/04/2018    TSH 2.53 11/17/2017    INR 3.99 (H) 01/04/2018     Prescriptions Prior to Admission   Medication Sig Dispense Refill Last Dose     metoprolol (LOPRESSOR) 25 MG tablet Take  2 tablets (50 mg) by mouth 2 times daily 20 tablet 0 1/4/2018 at Unknown time     simvastatin (ZOCOR) 10 MG tablet TAKE 1 TABLET (10 MG) BY MOUTH AT BEDTIME 30 tablet 0 1/3/2018 at Unknown time     sotalol (BETAPACE) 80 MG tablet Take 0.5 tablets (40 mg) by mouth 2 times daily Follow up visit needed with Dr Matt 90 tablet 1 12/29/2017     folic acid (FOLVITE) 1 MG tablet Take 1 tablet (1,000 mcg) by mouth daily 90 tablet 3 1/4/2018 at Unknown time     pantoprazole (PROTONIX) 20 MG EC tablet Take 1 tablet (20 mg) by mouth daily (Patient taking differently: Take 20 mg by mouth At Bedtime ) 90 tablet 3 1/3/2018 at Unknown time     warfarin (COUMADIN) 5 MG tablet 5 mg on Tue, Thu; 2.5 mg all other days or as directed by coumadin clinic 60 tablet 5 1/4/2018 at Unknown time     CENTRUM OR 1 TABLET DAILY   Past Week at Unknown time     PREVIDENT 5000 DRY MOUTH 1.1 % GEL topical gel Take by mouth daily  3 Taking     Pseudoeph-Doxylamine-DM-APAP (NYQUIL PO) Take by mouth as needed   Taking     ARTIFICIAL TEAR SOLUTION OP Apply  to eye. USE AS DIRECTED(DF)   Taking

## 2018-01-04 NOTE — OP NOTE
EP PROCEDURE NOTE    Procedures:  1. Reisolation of left pulmonary veins  2. Posterolateral right atrium focal atrial tachycardia ablation  3. Trans-septal Puncture x2  4. Intracardiac Echocardiography.  5. Invasive Hemodynamic Monitoring.  6. 3D electro-anatomic Mapping using Carto3.  7. Esophageal temperature monitoring.  8. Isuprel infusion.  9. Post ablation EP comprehensive EP study with RA, RV, CS pacing with HIS recording    Attending: Bekah Matt  EP Fellow: Lorraine  Procedure Date: 1/4/2018    Pre-operative Diagnosis:    1) Paroxysmal Atrial Fibrillaltion   2) Supraventricular tachycardia    Post-operative diagnosis:    1) Successful reisolation of left pulmonary veins  2) Right atrium focal atrial tachycardia (AT#1)  3) Atypical atrial flutter degenerated to AF (AT#3)  4) Non-sustained typical AVNRT    Complications:   None.  Fluoroscopy time/dose: 18.6 minutes, 3981 mGycm2.    Clinical Profile:  Mr. Haresh Rock is a very pleasant 67-year-old gentleman who is status post circumferential pulmonary vein isolation for paroxysmal atrial fibrillation in 03/2013.  The Zio patch showed multiple episodes of atrial tachycardia after October 23 and October 24, 2017. The longest episode lasted for more than 2 hours with a heart rate of 110 bpm. Patient also wears Apple Watch and reports multiple tachycardia episodes of 110 to 130 bpm in the last few weeks.    PROCEDURE  The risks and benefits of the procedure were explained to the patient in full.  The risks include, but are not limited to: pain, bleeding, blood transfusion reactions, arrhythmia, dissection of vessels, cardiac perforation, pericardial effusion, stroke, and death. Informed Consent was obtained. The patient was brought to the EP lab in a fasting and hemodynamically stable condition. The patient was prepped and draped in a sterile fashion. This procedure was done under general anesthesia.  Sheaths and Catheters:  After local anesthesia with 2%  lidocaine, vascular access was obtained using the modified Seldinger technique for the following access points:    Right Femoral Vein:   - 7Fr Locking Sheath: A decapolar catheter was positioned into the coronary sinus.    - 8Fr Sheath: RA quadripolar catheter, later exchanged for an SL1 trans-septal Sheath through which a Biosense Jane Thermacool Smartouch 3.5 mm tip DF curve mapping and ablation catheter was placed into the LA/RA.  - 8Fr Sheath: exchanged for SL1 trans-septal sheath sheath through which a Pentarray catheter was placed into the LA/RA.    Left Femoral Vein:  - 9 Fr Sheath - RV quadripolar catheter, later exchanged to an ICE catheter was placed into the RA / RV.  - 6Fr Sheath - HIS catheter    EP study results (in milliseconds):  Pre-ablation: In Sinus rhythm, AA=VV= 1012, NM= 194, QRS= 78, QT= 426.  Post-ablation: AA=VV= 840, NM= 190, QRS= 92, QT= 378.  AVBCL= 400, VABCL= 410.  AVERP= 600/220, UWWBQ=298/350    Procedure Description:  After the above sheaths were in place, the catheters were positioned under fluoroscopic guidance.  We performed EP study. We induced AT#1 with TCL 470ms, high to low activation, CS proximal to distal. Then patient developed AT#2 during Isuprel infusion with TCL 200ms, CS distal to proximal activation and degenerated to AF. We then decided to goto LA to check isolation of pulmonary veins.    The ICE catheter was placed into the RA.  Baseline survey of the heart with the ICE did not reveal the presence of any significant pericardial effusion. A temperature probe was placed in the esophagus.We then performed the trans-septal access:  The guide wire was advanced into the SVC.  The SL1 trans-septal sheath and dilator were advanced over the guide wire into the SVC and directed medially.  The guide wire was removed, the dilator was aspirated and the BRK-1 needle was advanced. Using both the SANTOS and Cypriot projections, the trans-septal system was then dragged down and the Fossa  Ovalis was engaged. Tenting of the interatrial septum was observed with ICE.  The needle was advanced into the LA and the location confirmed using ICE.  While fixing the needle, the dilator was slightly advanced across the septum. The dilator and then the sheath were advanced though the septum into the LA. The trans-septal needle was then withdrawn from the dilator. The dilator was removed and the sheath was then flushed. A Heparin bolus and drip were started, with serial monitoring of ACTs to keep ACT around 350. A second trans-septal puncture was then performed in a similar fashion as described above.    We used the Pentarray and ablation catheters to build the LA, pulmonary vein and LA appendage geometry with FAM on Carto3. This was matched with the cardiac CT 3D reconstruction of the LA and pulmonary veins. Voltage map of LA was performed which showed posterior and anterior gap on the left pulmonary veins WACA line. Right PVs were still isolated. With the left atrial map in place, we then performed a RFA lesions on the WACA line gap and successfully reisolated the left pulmonary veins. The Pentarray was used to confirm electrical isolation of the pulmonary veins.    We then came back to RA and try to induce AT#1. With Isuprel, patient developed junctional tachycardia or AVNRT which caused unstable hemodynamic and degenerated to AF multiple times in which we did cardioversion. We then use atrial extrastimuli without Isuprel to induce non-sustained AT#1 and was able to map the focal AT with earliest activation at posterolateral RA wall. RFA lesions were performed in this region and AT#1 became non-inducible.    Patient then developed AT#3,  ms, 2:1 AV conduction, CS prox to distal activation. P wave morphology looks like atypical atrial flutter. We performed RA activation map but during 50% of RA activation mapping, patient developed AF again. So far this doesn't look like typical atrial flutter. Earliest  activation from RA superior septum. This could be atypical flutter from LA also but we did no reinduce because patient keep having recurrent of AF during induction.     Post-ablation EP study was performed. We found AH jump of 70ms at 600/300 Cs pacing with typical AVNRT developed with TCL of 435ms and unstable hemodynamic. We could not reinduce the AVNRT but repeatedly see AH jump with 1-2 echos. Because patient never had clinical AVNRT in the past, hence we did not pursue this AVNRT at this time.     The sheaths were pulled out of the left atrium into the RA.  Heparin was stopped and protamine was given, after a test dose had revealed no reaction.  The catheters were withdrawn, and the sheaths were pulled.  Manual pressure was applied to both groins. The patient was then transported to PACU for extubation then to a monitored bed.     ASSESSMENT:  1) Successful reisolation of left pulmonary veins  2) Successful ablation of posterolateral wall right atrium focal atrial tachycardia (AT#1)  3) Atypical atrial flutter degenerated to AF (AT#3), therefore unable to complete the activation mapping and no ablation was performed for this.  4) Non-sustained typical AVNRT. Not his clinical SVT. Plan to monitor.      PLAN:  1. Hold warfarin for now due to high INR. Recheck INR in AM.  2. Restart sotalol 40mg PO BID  3. Discontinue metoprolol  4. FU with Dr. Matt in clinic. If patient develop SVT in the future, may consider EP study with possible ablation of AVNRT.    Lucian Peters  EP fellow      I was present during the entire procedure.    Bekah Matt MD, MS  EP/Cardiology Staff

## 2018-01-04 NOTE — ANESTHESIA POSTPROCEDURE EVALUATION
Patient: Haresh Rock    Procedure(s):  Anesthesia out of OR Cath Lab Atrial  Ablation     Diagnosis:supraventricular tachycardia  Diagnosis Additional Information: No value filed.    Anesthesia Type:  No value filed.    Note:  Anesthesia Post Evaluation    Patient location during evaluation: PACU  Patient participation: Able to fully participate in evaluation  Level of consciousness: awake and alert  Pain management: adequate  Airway patency: patent  Cardiovascular status: acceptable  Respiratory status: acceptable  Hydration status: acceptable  PONV: none     Anesthetic complications: None          Last vitals:  Vitals:    01/04/18 1700 01/04/18 1710 01/04/18 1720   BP: 130/73 135/79 142/70   Resp: 16     Temp:      SpO2: 99%           Electronically Signed By: Francesca Morelos MD  January 4, 2018  5:37 PM

## 2018-01-05 VITALS
RESPIRATION RATE: 16 BRPM | SYSTOLIC BLOOD PRESSURE: 129 MMHG | BODY MASS INDEX: 27.98 KG/M2 | TEMPERATURE: 98.6 F | HEIGHT: 72 IN | DIASTOLIC BLOOD PRESSURE: 68 MMHG | WEIGHT: 206.57 LBS | OXYGEN SATURATION: 93 %

## 2018-01-05 LAB
GLUCOSE BLDC GLUCOMTR-MCNC: 118 MG/DL (ref 70–99)
GLUCOSE BLDC GLUCOMTR-MCNC: 215 MG/DL (ref 70–99)
INR PPP: 3.9 (ref 0.86–1.14)
INTERPRETATION ECG - MUSE: NORMAL

## 2018-01-05 PROCEDURE — 25000132 ZZH RX MED GY IP 250 OP 250 PS 637: Performed by: STUDENT IN AN ORGANIZED HEALTH CARE EDUCATION/TRAINING PROGRAM

## 2018-01-05 PROCEDURE — 85610 PROTHROMBIN TIME: CPT | Performed by: NURSE PRACTITIONER

## 2018-01-05 PROCEDURE — 82962 GLUCOSE BLOOD TEST: CPT

## 2018-01-05 PROCEDURE — 36415 COLL VENOUS BLD VENIPUNCTURE: CPT | Performed by: NURSE PRACTITIONER

## 2018-01-05 PROCEDURE — A9270 NON-COVERED ITEM OR SERVICE: HCPCS | Performed by: STUDENT IN AN ORGANIZED HEALTH CARE EDUCATION/TRAINING PROGRAM

## 2018-01-05 RX ADMIN — SOTALOL HYDROCHLORIDE 40 MG: 80 TABLET ORAL at 07:57

## 2018-01-05 NOTE — PROGRESS NOTES
/69  Temp 98.2  F (36.8  C) (Oral)  Resp 18  Ht 1.829 m (6')  Wt 93.7 kg (206 lb 9.1 oz)  SpO2 93%  BMI 28.02 kg/m2  Patient here s/p successful SVT ablation. Patient also with history of A-fib. NO Coumadin tonight as INR was 3.99 today and 4.22 yesterday. Patient off flat bedrest at 2130. Patient sat up in bed so he may eat. Dill catheter removed, urine is clear alex with sediment or odor, catheter tubing was bloody at insertion site. Patient denied any discomfort with catheter removal. He ambulated to the restroom to have a BM with SBA. He complained of back pain while on bedrest, received Percocet 1 tablet PO with relief but full relief of back pain came when patient was able to sit up and move around. He is alert and oriented and able to make needs known. Will continue to monitor.

## 2018-01-05 NOTE — DISCHARGE INSTRUCTIONS
Plan:    Stop Metoprolol    Continue other medications without changes    Follow up with Dr. Matt in 3 months with heart monitor completed prior    Care of groin site:         Remove the Band-Aid after 24 hours. If there is minor oozing, apply another Band-aid and remove it after 12 hours.          Do NOT take a bath, use a hot tub, pool, or submerse in water for at least 3 days You may shower.          It is normal to have a small bruise or lump at the site.         Do not scrub the site.         Do not use lotion or powder near the puncture site for 3 days.         For the first 2 days: Do not stoop or squat. When you cough, sneeze or move your bowels, hold your hand over the puncture site and press gently.         Do not lift more than 10 pounds or exertional activity for 10 days.      If you start bleeding from the site in your groin:  Lie down flat and press firmly on the site.  Call your physician immediately, or, come to the emergency room.    Call 911 right away if you have bleeding that is heavy or does not stop.     Call your doctor/provider if:         You have a large or growing hard lump around the site.         The site is red, swollen, hot or tender.         Blood or fluid is draining from the site.         You have chills or a fever greater than 101 F (38 C).         Your leg or arm turns bluish, feels numb or cool.         You have hives, a rash or unusual itching.     [facility] Clinic:   [address] [place] [address]. [place] [address]  Your Care Team:  EP Cardiology   Telephone Number     Tatiana Matt [phone]   Tina Barnes RN  [phone]     For scheduling appts or procedures:    Natali North   [phone]     As always, Thank you for trusting us with your health care needs

## 2018-01-05 NOTE — PROGRESS NOTES
S/p SVT ablation. Pt is A&O x 4, denies pain, SOB and palpitations. Bilateral groin sites CDI, no hematoma and bleeding. Eating and tolerating well,voiding spontaneously. /68  Temp 98.6  F (37  C) (Oral)  Resp 16  Ht 1.829 m (6')  Wt 93.7 kg (206 lb 9.1 oz)  SpO2 93%  BMI 28.02 kg/m2

## 2018-01-05 NOTE — PROGRESS NOTES
Patient's heart rhythm went into SVT (145-150 bpm) at 0534 for less than 1 minute. Self-converted back into NSR. Patient felt palpitations. VSS. EP notified. Will continue to monitor.

## 2018-01-05 NOTE — DISCHARGE SUMMARY
Electrophysiology Discharge Summary  Date of Procedure: 1/4/18  DOS: 1/5/2018   Pre-operative Diagnosis:    1) Paroxysmal Atrial Fibrillaltion   2) Supraventricular tachycardia     Post-operative diagnosis:    1) Successful reisolation of left pulmonary veins  2) Right atrium focal atrial tachycardia (AT#1)  3) Atypical atrial flutter degenerated to AF (AT#3)  4) Non-sustained typical AVNRT  Hospital Course:  Mr. Rock is a 67 year old male who has a past medical history significant for DM2, antiphospholipid antibody syndrome, PAF (CHADSVASC 5 on warfarin) s/p PVI 3/2013, HTN, CKD II, DVT/PE, Hodgkin's disease, s/p BMT, and primary pulmonary HTN. More recently, he was having episodes of palpitations and higher heart rates. A zio patch showed multiple episodes of AT up to 2 hours with  bpm. He was started on Metoprolol. He has also been on sotalol 40 mg BID. He followed up with Dr. Matt and decided to pursue ablation.      Patient underwent a successful re-isolation of LPVs and ablation for RA focal AT. He was also noted to have an atypical AFL that degenerated to AF and was not able to be mapped as well as a non-sustained AVNRT which was not ablated. He had an uneventful overnight stay. Telemetry reviewed showing SR with run of SVT ~1 minute which spontaneously terminated (this was associated with palpitations). Groin sites are soft, CDI, no bruit, no bleeding, and no hematoma. Patient is tolerating oral intake, ambulating at baseline, and voiding without difficulties. Patient remains hemodynamically stable.     ROS:   10 point ROS neg other than the symptoms noted above.   Exam:  /68  Temp 98.6  F (37  C) (Oral)  Resp 16  Ht 1.829 m (6')  Wt 93.7 kg (206 lb 9.1 oz)  SpO2 93%  BMI 28.02 kg/m2    Constitutional: alert and no distress  Head: Normocephalic.  Neck: Neck supple. Carotids without bruits.  ENT: ENT exam normal, no neck nodes or sinus tenderness  Cardiovascular:  RRR. No murmurs,  clicks gallops or rub  Respiratory: Good diaphragmatic excursion. Lungs clear  Gastrointestinal: Abdomen soft, non-tender. BS normal.   : Deferred  Musculoskeletal: extremities normal- no gross deformities noted, gait normal and normal muscle tone  Skin: no suspicious lesions or rashes  Neurologic: Gait normal. Reflexes normal and symmetric. Sensation grossly WNL.  Psychiatric: mentation appears normal and affect normal/bright    Discharge Medications:   Haresh Rock   Home Medication Instructions FUENTES:[id number]    Printed on:01/05/18 0712   Medication Information                      ARTIFICIAL TEAR SOLUTION OP  Apply  to eye. USE AS DIRECTED(DF)             CENTRUM OR  1 TABLET DAILY             folic acid (FOLVITE) 1 MG tablet  Take 1 tablet (1,000 mcg) by mouth daily             pantoprazole (PROTONIX) 20 MG EC tablet  Take 1 tablet (20 mg) by mouth daily             PREVIDENT 5000 DRY MOUTH 1.1 % GEL topical gel  Take by mouth daily             Pseudoeph-Doxylamine-DM-APAP (NYQUIL PO)  Take by mouth as needed             simvastatin (ZOCOR) 10 MG tablet  TAKE 1 TABLET (10 MG) BY MOUTH AT BEDTIME             sotalol (BETAPACE) 80 MG tablet  Take 0.5 tablets (40 mg) by mouth 2 times daily Follow up visit needed with Dr Matt             warfarin (COUMADIN) 5 MG tablet  5 mg on Tue, Thu; 2.5 mg all other days or as directed by coumadin clinic                 Patient Instructions:    Care of groin site:         Remove the Band-Aid after 24 hours. If there is minor oozing, apply another Band-aid and remove it after 12 hours.          Do NOT take a bath, use a hot tub, pool, or submerse in water for at least 3 days You may shower.          It is normal to have a small bruise or lump at the site.         Do not scrub the site.         Do not use lotion or powder near the puncture site for 3 days.         For the first 2 days: Do not stoop or squat. When you cough, sneeze or move your bowels, hold your hand over  the puncture site and press gently.         Do not lift more than 10 pounds or exertional activity for 10 days.      If you start bleeding from the site in your groin:  Lie down flat and press firmly on the site.  Call your physician immediately, or, come to the emergency room.    Call 911 right away if you have bleeding that is heavy or does not stop.     Call your doctor/provider if:         You have a large or growing hard lump around the site.         The site is red, swollen, hot or tender.         Blood or fluid is draining from the site.         You have chills or a fever greater than 101 F (38 C).         Your leg or arm turns bluish, feels numb or cool.         You have hives, a rash or unusual itching.     Plan & Follow up:    Stop Metoprolol     Continue other home medications without changes     Follow up with EP NP in 1 month    Follow up with Dr. Matt in 3 months with zio patch monitor completed prior    Consider ablation for SVT as needed.     Discharge to home    The patient states understanding and is agreeable with the plan.     DEJAH Werner CNP  Electrophysiology Consult Service  Pager: [phone]

## 2018-01-05 NOTE — PHARMACY-ANTICOAGULATION SERVICE
Clinical Pharmacy - Warfarin Dosing Consult     Pharmacy has been consulted to manage this patient s warfarin therapy.  Indication: Atrial Fibrillation  Therapy Goal: INR 2-3  Warfarin Prior to Admission: Yes  Warfarin PTA Regimen: 5mg TueThur and 2.5 ROW; dose held 1/3 and pt took dose this AM PTA  Significant drug interactions: simvastatin  Dose Comments: pt denies any recent medication or diet changes other than eating too many [place] cookies    INR   Date Value Ref Range Status   01/04/2018 3.99 (H) 0.86 - 1.14 Final   01/03/2018 4.22 (H) 0.86 - 1.14 Final     Factor 2 Assay   Date Value Ref Range Status   05/01/2007 58 (L) 60 - 140 % Final     Comment:     (Note)  CALLED TO TAMARA(INTERV. RADIOLOGY)[id number],        Recommend no warfarin dose tonight.  Patient took 2.5mg this AM PTA and INR is supra therapeutic warfarin.  Pharmacy will monitor Haresh Rock daily and order warfarin doses to achieve specified goal.      Please contact pharmacy as soon as possible if the warfarin needs to be held for a procedure or if the warfarin goals change.      Nasra Meadows, PharmD  Pager: [phone]

## 2018-01-05 NOTE — PROGRESS NOTES
Patient s/p SVT ablation. A&Ox4 w/ stable vital signs. Bilateral groin sites - CDI, no hematoma present. Patient able to void, eat, and walk w/ SBA. Denies pain post bedrest. Will continue to monitor.

## 2018-01-08 LAB
BLD PROD TYP BPU: NORMAL
BLD UNIT ID BPU: 0
BLOOD PRODUCT CODE: NORMAL
BPU ID: NORMAL
TRANSFUSION STATUS PATIENT QL: NORMAL
TRANSFUSION STATUS PATIENT QL: NORMAL

## 2018-01-09 DIAGNOSIS — E78.5 HYPERLIPIDEMIA WITH TARGET LDL LESS THAN 100: ICD-10-CM

## 2018-01-10 RX ORDER — SIMVASTATIN 10 MG
TABLET ORAL
Qty: 15 TABLET | Refills: 0 | Status: SHIPPED | OUTPATIENT
Start: 2018-01-10 | End: 2018-01-24

## 2018-01-10 NOTE — TELEPHONE ENCOUNTER
Routing refill request to provider for review/approval because:  Jessica given x1 and patient did not follow up, please advise  Labs not current:    Lab Results   Component Value Date    CHOL 124 04/18/2016     Lab Results   Component Value Date    HDL 27 04/18/2016     Lab Results   Component Value Date    LDL 52 04/18/2016     Lab Results   Component Value Date    TRIG 220 04/18/2016     Lab Results   Component Value Date    CHOLHDLRATIO 4.1 11/16/2015

## 2018-01-10 NOTE — TELEPHONE ENCOUNTER
Signed Prescriptions:                        Disp   Refills    simvastatin (ZOCOR) 10 MG tablet           15 tab*0        Sig: TAKE 1 TABLET (10 MG) BY MOUTH AT BEDTIME NEED APPT  Authorizing Provider: PROMISE FENTON

## 2018-01-11 ENCOUNTER — ANTICOAGULATION THERAPY VISIT (OUTPATIENT)
Dept: NURSING | Facility: CLINIC | Age: 68
End: 2018-01-11
Payer: COMMERCIAL

## 2018-01-11 DIAGNOSIS — Z79.01 LONG-TERM (CURRENT) USE OF ANTICOAGULANTS: ICD-10-CM

## 2018-01-11 DIAGNOSIS — I48.0 PAROXYSMAL ATRIAL FIBRILLATION (H): ICD-10-CM

## 2018-01-11 LAB — INR POINT OF CARE: 2.3 (ref 0.86–1.14)

## 2018-01-11 PROCEDURE — 36416 COLLJ CAPILLARY BLOOD SPEC: CPT

## 2018-01-11 PROCEDURE — 85610 PROTHROMBIN TIME: CPT | Mod: QW

## 2018-01-11 PROCEDURE — 99207 ZZC NO CHARGE NURSE ONLY: CPT

## 2018-01-11 NOTE — PROGRESS NOTES
ANTICOAGULATION FOLLOW-UP CLINIC VISIT    Patient Name:  Haresh Rock  Date:  1/11/2018  Contact Type:  Face to Face    SUBJECTIVE:     Patient Findings     Positives No Problem Findings           OBJECTIVE    INR Protime   Date Value Ref Range Status   01/11/2018 2.3 (A) 0.86 - 1.14 Final     Factor 2 Assay   Date Value Ref Range Status   05/01/2007 58 (L) 60 - 140 % Final     Comment:     (Note)  CALLED TO TAMARA(INTERV. RADIOLOGY)[id number],        ASSESSMENT / PLAN  INR assessment THER    Recheck INR In: 6 WEEKS    INR Location Clinic      Anticoagulation Summary as of 1/11/2018     INR goal 2.0-3.0   Today's INR 2.3   Maintenance plan 5 mg (5 mg x 1) on Tue, Thu; 2.5 mg (5 mg x 0.5) all other days   Full instructions 5 mg on Tue, Thu; 2.5 mg all other days   Weekly total 22.5 mg   No change documented Laverne Ferguson RN   Plan last modified Belinda Francis RN (4/19/2016)   Next INR check 2/21/2018   Priority INR   Target end date Indefinite    Indications   Long-term (current) use of anticoagulants [Z79.01] [Z79.01]  Atrial fibrillation (H) [I48.91]         Anticoagulation Episode Summary     INR check location     Preferred lab     Send INR reminders to [facility]    Comments       Anticoagulation Care Providers     Provider Role Specialty Phone number    Anya Nowak MD [facility] [phone]            See the Encounter Report to view Anticoagulation Flowsheet and Dosing Calendar (Go to Encounters tab in chart review, and find the Anticoagulation Therapy Visit)    Dosage adjustment made based on physician directed care plan.    Laverne Ferguson, KOSTAS

## 2018-01-11 NOTE — MR AVS SNAPSHOT
Haresh Rock   1/11/2018 11:15 AM   Anticoagulation Therapy Visit    Description:  67 year old male   Provider:  [facility]   Department:  [facility]           INR as of 1/11/2018     Today's INR 2.3      Anticoagulation Summary as of 1/11/2018     INR goal 2.0-3.0   Today's INR 2.3   Full instructions 5 mg on Tue, Thu; 2.5 mg all other days   Next INR check 2/21/2018    Indications   Long-term (current) use of anticoagulants [Z79.01] [Z79.01]  Atrial fibrillation (H) [I48.91]         Your next Anticoagulation Clinic appointment(s)     Feb 21, 2018 10:30 AM CST   Anticoagulation Visit with [facility]   [facility] ([address] [address] [phone]              Contact Numbers     [facility]  Please call [phone] to cancel and/or reschedule your appointment   Please call [phone] with any problems or questions regarding your therapy.        January 2018 Details    Sun Mon Tue Wed Thu Fri Sat      1               2               3               4               5               6                 7               8               9               10               11      5 mg   See details      12      2.5 mg         13      2.5 mg           14      2.5 mg         15      2.5 mg         16      5 mg         17      2.5 mg         18      5 mg         19      2.5 mg         20      2.5 mg           21      2.5 mg         22      2.5 mg         23      5 mg         24      2.5 mg         25      5 mg         26      2.5 mg         27      2.5 mg           28      2.5 mg         29      2.5 mg         30      5 mg         31      2.5 mg             Date Details   01/11 This INR check               How to take your warfarin dose     To take:  2.5 mg Take 0.5 of a 5 mg tablet.    To take:  5 mg Take 1 of the 5 mg tablets.           February 2018 Details    Sun Mon Tue Wed Thu Fri Sat         1      5 mg         2      2.5 mg         3       2.5 mg           4      2.5 mg         5      2.5 mg         6      5 mg         7      2.5 mg         8      5 mg         9      2.5 mg         10      2.5 mg           11      2.5 mg         12      2.5 mg         13      5 mg         14      2.5 mg         15      5 mg         16      2.5 mg         17      2.5 mg           18      2.5 mg         19      2.5 mg         20      5 mg         21            22               23               24                 25               26               27               28                   Date Details   No additional details    Date of next INR:  2/21/2018         How to take your warfarin dose     To take:  2.5 mg Take 0.5 of a 5 mg tablet.    To take:  5 mg Take 1 of the 5 mg tablets.

## 2018-01-24 ENCOUNTER — OFFICE VISIT (OUTPATIENT)
Dept: FAMILY MEDICINE | Facility: CLINIC | Age: 68
End: 2018-01-24
Payer: COMMERCIAL

## 2018-01-24 VITALS
TEMPERATURE: 97.1 F | RESPIRATION RATE: 16 BRPM | DIASTOLIC BLOOD PRESSURE: 72 MMHG | BODY MASS INDEX: 28.04 KG/M2 | SYSTOLIC BLOOD PRESSURE: 110 MMHG | HEIGHT: 72 IN | WEIGHT: 207 LBS | HEART RATE: 70 BPM | OXYGEN SATURATION: 98 %

## 2018-01-24 DIAGNOSIS — Z94.81 STATUS POST BONE MARROW TRANSPLANT (H): ICD-10-CM

## 2018-01-24 DIAGNOSIS — N18.2 CKD (CHRONIC KIDNEY DISEASE) STAGE 2, GFR 60-89 ML/MIN: ICD-10-CM

## 2018-01-24 DIAGNOSIS — Z71.89 ADVANCED DIRECTIVES, COUNSELING/DISCUSSION: ICD-10-CM

## 2018-01-24 DIAGNOSIS — N18.2 TYPE 2 DIABETES MELLITUS WITH STAGE 2 CHRONIC KIDNEY DISEASE, WITHOUT LONG-TERM CURRENT USE OF INSULIN (H): Primary | ICD-10-CM

## 2018-01-24 DIAGNOSIS — I48.0 PAROXYSMAL ATRIAL FIBRILLATION (H): ICD-10-CM

## 2018-01-24 DIAGNOSIS — E78.5 HYPERLIPIDEMIA WITH TARGET LDL LESS THAN 100: ICD-10-CM

## 2018-01-24 DIAGNOSIS — D68.61 ANTIPHOSPHOLIPID ANTIBODY SYNDROME (H): ICD-10-CM

## 2018-01-24 DIAGNOSIS — E11.22 TYPE 2 DIABETES MELLITUS WITH STAGE 2 CHRONIC KIDNEY DISEASE, WITHOUT LONG-TERM CURRENT USE OF INSULIN (H): Primary | ICD-10-CM

## 2018-01-24 PROBLEM — Z92.3 HISTORY OF IRRADIATION, PRESENTING HAZARDS TO HEALTH: Status: ACTIVE | Noted: 2017-03-24

## 2018-01-24 LAB
CREAT UR-MCNC: 216 MG/DL
MICROALBUMIN UR-MCNC: 50 MG/L
MICROALBUMIN/CREAT UR: 23.1 MG/G CR (ref 0–17)

## 2018-01-24 PROCEDURE — 99214 OFFICE O/P EST MOD 30 MIN: CPT | Performed by: FAMILY MEDICINE

## 2018-01-24 PROCEDURE — 82043 UR ALBUMIN QUANTITATIVE: CPT | Performed by: FAMILY MEDICINE

## 2018-01-24 RX ORDER — SIMVASTATIN 10 MG
TABLET ORAL
Qty: 90 TABLET | Refills: 3 | Status: SHIPPED | OUTPATIENT
Start: 2018-01-24 | End: 2019-02-02

## 2018-01-24 ASSESSMENT — PAIN SCALES - GENERAL: PAINLEVEL: NO PAIN (0)

## 2018-01-24 NOTE — PATIENT INSTRUCTIONS
[facility]    If you have any questions regarding to your visit please contact your care team:       Team Red:   Clinic Hours Telephone Number   Dr. Anya Wen, NP   7am-7pm  Monday - Thursday   7am-5pm  Fridays  [phone]  (Appointment scheduling available 24/7)    Questions about your visit?   Team Line  [phone]   Urgent Care - [place] - 11am-9pm Monday-Friday Saturday-Sunday- 9am-5pm   [place] - 5pm-9pm Monday-Friday Saturday-Sunday- 9am-5pm  [phone] - [place]   [phone] - [place]       What options do I have for visits at the clinic other than the traditional office visit?  To expand how we care for you, many of our providers are utilizing electronic visits (e-visits) and telephone visits, when medically appropriate, for interactions with their patients rather than a visit in the clinic.   We also offer nurse visits for many medical concerns. Just like any other service, we will bill your insurance company for this type of visit based on time spent on the phone with your provider. Not all insurance companies cover these visits. Please check with your medical insurance if this type of visit is covered. You will be responsible for any charges that are not paid by your insurance.      E-visits via [employer]:  generally incur a $35.00 fee.  Telephone visits:  Time spent on the phone: *charged based on time that is spent on the phone in increments of 10 minutes. Estimated cost:   5-10 mins $30.00   11-20 mins. $59.00   21-30 mins. $85.00     Use [employer] (secure email communication and access to your chart) to send your primary care provider a message or make an appointment. Ask someone on your Team how to sign up for [employer].  For a Price Quote for your services, please call our Consumer Price Line at [phone].      As always, Thank you for trusting us with your health care needs!  Aleksandra COLON  MA

## 2018-01-24 NOTE — MR AVS SNAPSHOT
After Visit Summary   1/24/2018    Haresh Rock    MRN: [id number]           Patient Information     Date Of Birth          1950        Visit Information        Provider Department      1/24/2018 1:20 PM Anya Nowak MD [facility]        Today's Diagnoses     Type 2 diabetes mellitus with stage 2 chronic kidney disease, without long-term current use of insulin (H)    -  1    CKD (chronic kidney disease) stage 2, GFR 60-89 ml/min        Paroxysmal atrial fibrillation (H)        Antiphospholipid antibody syndrome (H)        Status post bone marrow transplant (H)        Hyperlipidemia with target LDL less than 100        Advanced directives, counseling/discussion          Care Instructions    [facility]    If you have any questions regarding to your visit please contact your care team:       Team Red:   Clinic Hours Telephone Number   Dr. Anya Wen, NP   7am-7pm  Monday - Thursday   7am-5pm  Fridays  [phone]  (Appointment scheduling available 24/7)    Questions about your visit?   Team Line  [phone]   Urgent Care - [place] and [place] - 11am-9pm Monday-Friday Saturday-Sunday- 9am-5pm   [place] - 5pm-9pm Monday-Friday Saturday-Sunday- 9am-5pm  [phone] - [place]   [phone] - [place]       What options do I have for visits at the clinic other than the traditional office visit?  To expand how we care for you, many of our providers are utilizing electronic visits (e-visits) and telephone visits, when medically appropriate, for interactions with their patients rather than a visit in the clinic.   We also offer nurse visits for many medical concerns. Just like any other service, we will bill your insurance company for this type of visit based on time spent on the phone with your provider. Not all insurance companies cover these visits. Please check with your medical insurance  if this type of visit is covered. You will be responsible for any charges that are not paid by your insurance.      E-visits via [employer]:  generally incur a $35.00 fee.  Telephone visits:  Time spent on the phone: *charged based on time that is spent on the phone in increments of 10 minutes. Estimated cost:   5-10 mins $30.00   11-20 mins. $59.00   21-30 mins. $85.00     Use [employer] (secure email communication and access to your chart) to send your primary care provider a message or make an appointment. Ask someone on your Team how to sign up for [employer].  For a Price Quote for your services, please call our [employer] Line at [phone].      As always, Thank you for trusting us with your health care needs!  Aleksandra COLON MA            Follow-ups after your visit        Follow-up notes from your care team     Return in about 1 year (around 1/24/2019), or if symptoms worsen or fail to improve, for Wellness visit (fasting labs up to one week prior).      Your next 10 appointments already scheduled     Feb 05, 2018 10:00 AM CST   Return Visit with DEJAH Alegre CNP   [facility] ([facility])    [address]  [facility] [address]   [phone]            Feb 21, 2018 10:30 AM CST   Anticoagulation Visit with NICOLAS ANTI COAG   [facility] ([facility])    [address] [place] [address]   [phone]            Apr 09, 2018  1:40 PM CDT   Return Visit with Bekah Matt MD   [facility] ([facility])    [address] [address]   [phone]            May 22, 2018 10:00 AM CDT   [facility] Lab Draw with  MASONIC LAB DRAW   [facility] Lab Draw ([facility])    [address]  [address]  [place] [address] [phone]            May 22, 2018 10:30 AM CDT   Return  with Augusto Miller MD   [facility] Blood and Marrow Transplant ([facility] [place])    [address]  [address]  [facility] [address] [phone]              Who to contact     If you have questions or need follow up information about today's clinic visit or your schedule please contact [facility] directly at [phone].  Normal or non-critical lab and imaging results will be communicated to you by itsDapperhart, letter or phone within 4 business days after the clinic has received the results. If you do not hear from us within 7 days, please contact the clinic through SaveFans!t or phone. If you have a critical or abnormal lab result, we will notify you by phone as soon as possible.  Submit refill requests through [employer] or call your pharmacy and they will forward the refill request to us. Please allow 3 business days for your refill to be completed.          Additional Information About Your Visit        itsDapperharZighra Information     [employer] gives you secure access to your electronic health record. If you see a primary care provider, you can also send messages to your care team and make appointments. If you have questions, please call your primary care clinic.  If you do not have a primary care provider, please call [phone] and they will assist you.        Care EveryWhere ID     This is your Care EveryWhere ID. This could be used by other organizations to access your [place] medical records  [id number]        Your Vitals Were     Pulse Temperature Respirations Height Pulse Oximetry BMI (Body Mass Index)    70 97.1  F (36.2  C) (Oral) 16 6' (1.829 m) 98% 28.07 kg/m2       Blood Pressure from Last 3 Encounters:   01/24/18 110/72   01/05/18 129/68   01/03/18 134/73    Weight from Last 3 Encounters:   01/24/18 207 lb (93.9 kg)   01/04/18 206 lb 9.1 oz (93.7 kg)   01/01/18 205 lb (93 kg)              We Performed the Following     Albumin Random Urine Quantitative with  Creat Ratio     HEMOGLOBIN A1C     Lipid panel reflex to direct LDL Fasting          Today's Medication Changes          These changes are accurate as of 1/24/18  2:17 PM.  If you have any questions, ask your nurse or doctor.               These medicines have changed or have updated prescriptions.        Dose/Directions    pantoprazole 20 MG EC tablet   Commonly known as:  PROTONIX   This may have changed:  when to take this   Used for:  Fbtll-ygbpcy-peiu disease, chronic (H), Hodgkin's disease of extranodal or solid organ site (H)        Dose:  20 mg   Take 1 tablet (20 mg) by mouth daily   Quantity:  90 tablet   Refills:  3       simvastatin 10 MG tablet   Commonly known as:  ZOCOR   This may have changed:  See the new instructions.   Used for:  Hyperlipidemia with target LDL less than 100   Changed by:  Anya Nowak MD        TAKE 1 TABLET (10 MG) BY MOUTH AT BEDTIME   Quantity:  90 tablet   Refills:  3            Where to get your medicines      These medications were sent to [facility]/pharmacy #[id number] [facility], [place] - [address] [address]     Phone:  [phone]     simvastatin 10 MG tablet                Primary Care Provider Office Phone # Fax #    Anya Nowak [phone] [address] [facility] [address]        Equal Access to Services     JOSELINE MONGE AH: Hadii sabas ku hadasho Soomaali, waaxda luqadaha, qaybta kaalmada adeegyada, waxay debbie hayanila cunningham. So [facility] [place] [phone].    ATENCIÓN: Si habla español, tiene a barger disposición servicios gratuitos de asistencia lingüística. Llame al [phone].    We comply with applicable federal civil rights laws and [place] laws. We do not discriminate on the basis of race, color, national origin, age, disability, sex, sexual orientation, or gender identity.            Thank you!     Thank you for choosing [facility]  for your care. Our goal is always to provide  you with excellent care. Hearing back from our patients is one way we can continue to improve our services. Please take a few minutes to complete the written survey that you may receive in the mail after your visit with us. Thank you!             Your Updated Medication List - Protect others around you: Learn how to safely use, store and throw away your medicines at www.disposemymeds.org.          This list is accurate as of 1/24/18  2:17 PM.  Always use your most recent med list.                   Brand Name Dispense Instructions for use Diagnosis    ARTIFICIAL TEAR SOLUTION OP      Apply  to eye. USE AS DIRECTED(DF)        CENTRUM PO      1 TABLET DAILY        folic acid 1 MG tablet    FOLVITE    90 tablet    Take 1 tablet (1,000 mcg) by mouth daily    Sbdol-ybwhuk-ahzn disease, chronic (H), Lymphoma (H)       NYQUIL PO      Take by mouth as needed        pantoprazole 20 MG EC tablet    PROTONIX    90 tablet    Take 1 tablet (20 mg) by mouth daily    Yghkw-lmynjo-qriu disease, chronic (H), Hodgkin's disease of extranodal or solid organ site (H)       PREVIDENT 5000 DRY MOUTH 1.1 % Gel topical gel   Generic drug:  sodium fluoride dental gel      Take by mouth daily    Hodgkin's disease of extranodal or solid organ site (H), Status post bone marrow transplant (H), Hereditary hemochromatosis (H), Thyroid nodule       simvastatin 10 MG tablet    ZOCOR    90 tablet    TAKE 1 TABLET (10 MG) BY MOUTH AT BEDTIME    Hyperlipidemia with target LDL less than 100       sotalol 80 MG tablet    BETAPACE    90 tablet    Take 0.5 tablets (40 mg) by mouth 2 times daily Follow up visit needed with Dr Matt    Atrial fibrillation (H)       warfarin 5 MG tablet    COUMADIN    60 tablet    5 mg on Tue, Thu; 2.5 mg all other days or as directed by coumadin clinic    Atrial fibrillation, unspecified type (H), Antiphospholipid antibody syndrome (H)

## 2018-01-24 NOTE — PROGRESS NOTES
SUBJECTIVE:   Haresh Rock is a 67 year old male with history of BMT for Hodgkin's lymphoma who presents to clinic today for the following health issues:    Hyperlipidemia Follow-Up      Rate your low fat/cholesterol diet?: fair    Taking statin?  Yes, muscle aches, possibly from other cause    Other lipid medications/supplements?:  none      Amount of exercise or physical activity: house work    Problems taking medications regularly: No    Medication side effects: none    Diet: regular (no restrictions)    Patient also presents to follow-up diabetes. Diabetic Review of Systems - Medication compliance:  DIET controlled, Diabetic diet compliance:  compliant most of the time, Home glucose monitoring:  are NOT performed, Diabetic ROS: no polyuria or polydipsia, no chest pain, dyspnea or TIA's, Last eye exam approximately 1 week ago.     He is followed by cardiology for paroxysmal atrial fibrillation on coumadin and antiarrythmic. He recently underwent another ablation procedure and asymptomatic.     I have reviewed the patient's medical history in detail and updated the computerized patient record.     ROS:  C: NEGATIVE for fever, chills, change in weight  I: NEGATIVE for worrisome rashes, moles or lesions  E: NEGATIVE for vision changes or irritation  R: NEGATIVE for significant cough or SOB  CV: NEGATIVE for chest pain, palpitations or peripheral edema  MUSCULOSKELETAL: s/p knee replacements   N: NEGATIVE for weakness, dizziness or paresthesias  ENDOCRINE: Hx diabetes  HEME/ALLERGY/IMMUNE: antiphospholipid syndrome   P: NEGATIVE for changes in mood or affect    OBJECTIVE:     /72 (BP Location: Left arm, Patient Position: Chair, Cuff Size: Adult Regular)  Pulse 70  Temp 97.1  F (36.2  C) (Oral)  Resp 16  Ht 6' (1.829 m)  Wt 207 lb (93.9 kg)  SpO2 98%  BMI 28.07 kg/m2  Body mass index is 28.07 kg/(m^2).  GENERAL: healthy, alert and no distress  NECK: no adenopathy, no asymmetry, masses, or scars and  thyroid normal to palpation  RESP: lungs clear to auscultation - no rales, rhonchi or wheezes  CV: regular rate and rhythm, normal S1 S2, no S3 or S4, no murmur, click or rub   ABDOMEN: soft, nontender, no hepatosplenomegaly, no masses and bowel sounds normal  MS: no gross musculoskeletal defects noted, no edema    Diagnostic Test Results:  Results for orders placed or performed in visit on 01/11/18   INR point of care   Result Value Ref Range    INR Protime 2.3 (A) 0.86 - 1.14     *Note: Due to a large number of results and/or encounters for the requested time period, some results have not been displayed. A complete set of results can be found in Results Review.       ASSESSMENT/PLAN:   (E11.22,  N18.2) Type 2 diabetes mellitus with stage 2 chronic kidney disease, without long-term current use of insulin (H)  (primary encounter diagnosis)  (N18.2) CKD (chronic kidney disease) stage 2, GFR 60-89 ml/min  Comment: diet controlled   Plan: Counseled to make better food choices, exercise as tolerated, and follow-up yearly.     (I48.0) Paroxysmal atrial fibrillation (H)  Comment: rate controlled and anticoagulated   Plan: follow-up with cardiology as planned     (D68.61) Antiphospholipid antibody syndrome (H)  Plan: Continue chronic anticoagulation under surveillance of protime clinic with goal INR 2 - 3 indefinitely      (Z94.81) Status post bone marrow transplant (H)  Plan: follow-up at [facility] as planned     (E78.5) Hyperlipidemia with target LDL less than 100  Comment: Well controlled with medications without side effects.   Plan: simvastatin (ZOCOR) 10 MG tablet, Lipid panel         reflex to direct LDL Fasting          (Z71.89) Advanced directives, counseling/discussion  Plan: living will on file       See Patient Instructions    Anya Nowak MD  [facility]

## 2018-01-25 DIAGNOSIS — E78.5 HYPERLIPIDEMIA WITH TARGET LDL LESS THAN 100: ICD-10-CM

## 2018-01-25 DIAGNOSIS — E11.22 TYPE 2 DIABETES MELLITUS WITH STAGE 2 CHRONIC KIDNEY DISEASE, WITHOUT LONG-TERM CURRENT USE OF INSULIN (H): ICD-10-CM

## 2018-01-25 DIAGNOSIS — N18.2 TYPE 2 DIABETES MELLITUS WITH STAGE 2 CHRONIC KIDNEY DISEASE, WITHOUT LONG-TERM CURRENT USE OF INSULIN (H): ICD-10-CM

## 2018-01-25 LAB
CHOLEST SERPL-MCNC: 122 MG/DL
HBA1C MFR BLD: 6.4 % (ref 4.3–6)
HDLC SERPL-MCNC: 31 MG/DL
LDLC SERPL CALC-MCNC: 69 MG/DL
NONHDLC SERPL-MCNC: 91 MG/DL
TRIGL SERPL-MCNC: 111 MG/DL

## 2018-01-25 PROCEDURE — 36415 COLL VENOUS BLD VENIPUNCTURE: CPT | Performed by: FAMILY MEDICINE

## 2018-01-25 PROCEDURE — 83036 HEMOGLOBIN GLYCOSYLATED A1C: CPT | Performed by: FAMILY MEDICINE

## 2018-01-25 PROCEDURE — 80061 LIPID PANEL: CPT | Performed by: FAMILY MEDICINE

## 2018-01-26 NOTE — PROGRESS NOTES
Haresh,    Your blood glucose is stable and cholesterol is controlled. Eat healthy foods like fruits, vegetables, chicken, fish, nuts, and low fat yogurt. Drink water and milk instead of pop and juice. Avoid sweets. Exercise regularly. Follow-up yearly.    Anya Nowak MD

## 2018-02-01 ENCOUNTER — DOCUMENTATION ONLY (OUTPATIENT)
Dept: CARDIOLOGY | Facility: CLINIC | Age: 68
End: 2018-02-01

## 2018-02-01 NOTE — PROGRESS NOTES
Per patient request, on December 6, 2018  an order was faxed to [facility] requesting a Zio patch to be mailed to the patient. The patient is to wear the zio March 9- 23, 2018.    Natali North  [facility]   [phone]

## 2018-02-05 ENCOUNTER — OFFICE VISIT (OUTPATIENT)
Dept: CARDIOLOGY | Facility: CLINIC | Age: 68
End: 2018-02-05
Payer: COMMERCIAL

## 2018-02-05 VITALS
OXYGEN SATURATION: 98 % | WEIGHT: 205 LBS | BODY MASS INDEX: 27.8 KG/M2 | SYSTOLIC BLOOD PRESSURE: 109 MMHG | DIASTOLIC BLOOD PRESSURE: 69 MMHG | HEART RATE: 84 BPM

## 2018-02-05 DIAGNOSIS — I48.0 PAROXYSMAL ATRIAL FIBRILLATION (H): Primary | ICD-10-CM

## 2018-02-05 DIAGNOSIS — Z86.79 S/P ABLATION OPERATION FOR ARRHYTHMIA: ICD-10-CM

## 2018-02-05 DIAGNOSIS — Z98.890 S/P ABLATION OPERATION FOR ARRHYTHMIA: ICD-10-CM

## 2018-02-05 DIAGNOSIS — I47.10 SVT (SUPRAVENTRICULAR TACHYCARDIA) (H): ICD-10-CM

## 2018-02-05 PROCEDURE — 99214 OFFICE O/P EST MOD 30 MIN: CPT | Performed by: NURSE PRACTITIONER

## 2018-02-05 ASSESSMENT — PAIN SCALES - GENERAL: PAINLEVEL: NO PAIN (0)

## 2018-02-05 NOTE — LETTER
2/5/2018      RE: Haresh Rock  [address] [facility] [address]       Dear Colleague,    Thank you for the opportunity to participate in the care of your patient, Haresh Rock, at the [facility] HEART AT [facility]. Please see a copy of my visit note below.        Clinical Cardiac Electrophysiology    Chief Complaint: Follow up post ablation, PAF, SVT    HPI: Mr. Rock is a 67 year old male with a past medical history significant for DM2, antiphospholipid antibody syndrome, AT, PAF (CHADSVASC 5 on warfarin) s/p PVI 3/2013, HTN, CKD II, DVT/PE, Hodgkin's disease, s/p BMT, and primary pulmonary HTN. He had an EP visit in October 2016 for episodes of palpitations and higher heart rates. A zio patch showed multiple episodes of AT up to 2 hours with  bpm. He was started on Metoprolol. He has also been on sotalol 40 mg BID. He followed up with Dr. Matt and decided to pursue ablation. On 1/4/2018, patient underwent a successful re-isolation of LPVs and ablation for RA focal AT. 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 Metoprolol was discontinued and sotalol was continued.     Haresh Rock  presents s/p reisloation PVI and right atrial focal AT ablation  on 1/4/2018  for PAF and AT. Patient states has resumed normal activity. Denies chest pain or pressure, headaches, syncope, angina, dyspnea at rest or with exertion, dry cough, orthopnea, PND, abdominal pain, abdominal edema, pedal edema, or claudication.  Denies easy bruising or bleeding, hematuria, hematochezia, and epistaxis. Denies signs/symptoms of stroke such as visual disturbance, difficulty speaking, facial drooping, confusion, problems with gait, or any new numbness or weakness. Denies dysphagia,  fever, chills, nausea, or vomiting. States that incision site is well healed without drainage, redness, warmth, or pain.    Mr. Rock states that he has one symptomatic episode of palpitations that was 2 days after his ablation and lasted 20 minutes.  He has had about 10 episodes over the past month of asymptomatic heart rates between 100-120 on his Apple watch that lasted for a few minutes.  Patient states that he has a second of dizziness when he goes from sitting to standing that has been unchanged for the past several years.    Current cardiac medications: Sotalol 40 mg twice daily, simvastatin 10mg daily, warfarin      Current Outpatient Prescriptions   Medication Sig Dispense Refill     simvastatin (ZOCOR) 10 MG tablet TAKE 1 TABLET (10 MG) BY MOUTH AT BEDTIME 90 tablet 3     sotalol (BETAPACE) 80 MG tablet Take 0.5 tablets (40 mg) by mouth 2 times daily Follow up visit needed with Dr Matt 90 tablet 1     PREVIDENT 5000 DRY MOUTH 1.1 % GEL topical gel Take by mouth daily  3     folic acid (FOLVITE) 1 MG tablet Take 1 tablet (1,000 mcg) by mouth daily 90 tablet 3     pantoprazole (PROTONIX) 20 MG EC tablet Take 1 tablet (20 mg) by mouth daily (Patient taking differently: Take 20 mg by mouth At Bedtime ) 90 tablet 3     warfarin (COUMADIN) 5 MG tablet 5 mg on Tue, Thu; 2.5 mg all other days or as directed by coumadin clinic 60 tablet 5     Pseudoeph-Doxylamine-DM-APAP (NYQUIL PO) Take by mouth as needed       ARTIFICIAL TEAR SOLUTION OP Apply  to eye. USE AS DIRECTED(DF)       CENTRUM OR 1 TABLET DAILY         Past Medical History:   Diagnosis Date     Antiphospholipid antibody syndrome (H) 2005    Ravi's     Atrial fibrillation (H) 4/2011     Cirrhosis (H)      CKD (chronic kidney disease) stage 1, GFR 90 ml/min or greater      CKD (chronic kidney disease) stage 2, GFR 60-89 ml/min      Diabetes mellitus type II     steroid induced     DJD (degenerative joint disease) of knee     SHAWN, worse on right     DVT  (deep venous thrombosis) (H)      ED (erectile dysfunction)      Gallbladder polyp 5/2010     Nsjzd-Nfcvon-Yytm Disease 2007    BMT     Hemochromatosis      Hodgkin's disease NOS     5 cycles of ABVD in 2011     HTN (hypertension)      Hyperlipidaemia LDL goal <100      Myeloproliferative disorder (H)     atypical, [facility]     PE (pulmonary embolism) 11/2004     Polyneuropathy      Primary pulmonary hypertension (H)        Past Surgical History:   Procedure Laterality Date     ANESTHESIA CARDIOVERSION  4/21/2011    Procedure:ANESTHESIA CARDIOVERSION; Surgeon:GENERIC ANESTHESIA PROVIDER; Location:[facility] OR     ARTHROSCOPY KNEE RT/LT      LT     CATARACT IOL, RT/LT       COLONOSCOPY  10/16/2012    Procedure: COLONOSCOPY;  Colonoscopy, screening;  Surgeon: Reg Feliciano MD;  Location: [facility] OR     H ABLATION FOCAL AFIB  3/5/2013    PVI     H ABLATION FOCAL AFIB  01/01/2018     HC BONE MARROW/STEM TRANSPLANT ALLOGENIC  5/8/2007     INSERT PORT VASCULAR ACCESS       JOINT REPLACEMTN, KNEE RT/LT  3/28/12    SHAWN     VASECTOMY  1990       Family History   Problem Relation Age of Onset     Hypertension Mother      CEREBROVASCULAR DISEASE Mother      Arrhythmia Brother      Arrhythmia Sister      Alzheimer Disease Father      DIABETES Paternal Aunt      GASTROINTESTINAL DISEASE Maternal Grandmother      colitis      Thyroid Disease Sister      CANCER No family hx of      C.A.D. No family hx of      Thyroid Cancer No family hx of        Social History   Substance Use Topics     Smoking status: Never Smoker     Smokeless tobacco: Never Used     Alcohol use No       Allergies   Allergen Reactions     No Known Drug Allergy          ROS:   Negative except for as indicated in HPI.    Physical Examination:  Vitals: /69 (BP Location: Left arm, Patient Position: Chair, Cuff Size: Adult Regular)  Pulse 84  Wt 93 kg (205 lb)  SpO2 98%  BMI 27.8 kg/m2  BMI= Body mass index is 27.8 kg/(m^2).    GENERAL APPEARANCE: healthy, alert,  and no acute distress  HEENT: no icterus, no xanthelasmas, normal pupil size and reaction, no cyanosis.  NECK: no asymmetry, thyroid normal to palpation, carotid upstrokes are normal bilaterally, no cervical bruits, no JVD   CHEST: lungs clear to auscultation - no rales, rhonchi or wheezes, no use of accessory muscles, no retractions, respirations are unlabored, normal respiratory rate  CARDIOVASCULAR: regular rhythm, Grade I murmur at upper RSB, no S3 or S4 and no murmur, click or rub, precordium quiet with normal PMI.  ABDOMEN: soft, non-tender, without hepatosplenomegaly, no masses palpable, bowel sounds normal, aorta not enlarged by palpation, no abdominal bruits  EXTREMITIES: no clubbing, cyanosis, or edema  NEURO: alert and oriented to person/place/time, normal speech, gait and affect  VASC: Radial, dorsalis pedis, and posterior tibialis pulses +2 and symmetric bilaterally.   SKIN: no ecchymoses, no rashes. Well healed incisions.    Laboratory:  Cholesterol (mg/dL)   Date Value   2018 122   2016 124   2015 126   2014 168     Cholesterol/HDL Ratio (no units)   Date Value   2015 4.1   2014 5.8 (H)   2013 5.0   2012 4.9     HDL Cholesterol (mg/dL)   Date Value   2018 31 (L)   2016 27 (L)   2015 31 (L)   2014 29 (L)     LDL Cholesterol Calculated (mg/dL)   Date Value   2018 69   2016 52   2015 71   2014 104     EK2018 NSR  ECHO:   Recent Results (from the past 8760 hour(s))   Echo complete with definity    Narrative    [id number]  [id number]  [id number]  [id number]^DEVENDRA^REYNA^[facility]KE[facility]  [facility]-3rd Floor  [address] [place] [address]     Name: PAMELA GRANADOS  MRN: [id number]  : 1950  Study Date: 2017 08:30 AM  Age: 67 yrs  Gender: Male  Patient Location: [facility]  Reason For Study: , Paroxysmal atrial  fibrillation (H)  Ordering Physician: REYNA RAMSAY  Referring Physician: REYNA RAMSAY  Performed By: Donal Sy RDCS     BSA: 2.2 m2  Height: 72 in  Weight: 208 lb  HR: 60  BP: 127/71 mmHg  _____________________________________________________________________________  __        Procedure  Echocardiogram with two-dimensional, color and spectral Doppler performed.  Contrast Definity. Definity (NDC #97005-332-77) given intravenously. Patient  was given 5ml mixture of 1.5ml Definity and 8.5ml saline. 5 ml wasted. IV  start location RAC .  _____________________________________________________________________________  __        Interpretation Summary  Normal left ventricular size, thickness, and systolic function. EF 55-60%.  Mild right ventricular dilation with normal function.  Moderate to severe calcification of the aortic valve with mild stenosis and  mild insufficiency. Low flow-low gradient AS should be considered, aortic  valve area is 1.6 cm2 and stroke volume is mildly reduced.  No pericardial effusion.     Compared to the prior study 3/4/2013, the aortic valve appears more calcified.  _____________________________________________________________________________  __        Left Ventricle  Left ventricular size is normal. Left ventricular wall thickness is normal.  Global and regional left ventricular function is normal with an EF of 55-60%.  Normal left ventricular filling for age.     Right Ventricle  Global right ventricular function is normal. Mild right ventricular dilation  is present.     Atria  Both atria appear normal.     Mitral Valve  Mild mitral annular calcification is present. Trace to mild mitral  insufficiency is present.        Aortic Valve  Moderate to severe aortic valve sclerosis is present. Mild aortic  insufficiency is present. The calculated aortic valve are is 1.6 cm^2.     Tricuspid Valve  Mild tricuspid insufficiency is present. Right ventricular systolic pressure  is  27mmHg above the right atrial pressure.     Pulmonic Valve  The pulmonic valve is normal.     Vessels  The aorta root is normal. The inferior vena cava cannot be assessed.     Pericardium  No pericardial effusion is present.        Attestation  I have personally viewed the imaging and agree with the interpretation and  report as documented by the fellow, Jens Corley, and/or edited by me.  _____________________________________________________________________________  __  MMode/2D Measurements & Calculations  IVSd: 0.92 cm     LVIDd: 4.9 cm  LVIDs: 2.9 cm  LVPWd: 0.71 cm  FS: 40.1 %  EDV(Teich): 111.0 ml  ESV(Teich): 32.7 ml  LV mass(C)d: 133.4 grams  LV mass(C)dI: 61.6 grams/m2  Ao root diam: 3.2 cm  LA dimension: 3.7 cm  asc Aorta Diam: 3.0 cm  LA/Ao: 1.2  LVOT diam: 2.2 cm  LVOT area: 3.8 cm2  LA Volume (BP): 67.0 ml  LA Volume Index (BP): 30.9 ml/m2  RWT: 0.29           Doppler Measurements & Calculations  MV E max smooth: 52.6 cm/sec  MV A max smooth: 48.0 cm/sec  MV E/A: 1.1  MV dec time: 0.14 sec  Ao V2 max: 182.3 cm/sec  Ao max P.0 mmHg  Ao V2 mean: 127.0 cm/sec  Ao mean P.3 mmHg  Ao V2 VTI: 43.6 cm  ELIANE(I,D): 1.6 cm2  ELIANE(V,D): 1.5 cm2  LV V1 max P.2 mmHg  LV V1 max: 74.1 cm/sec  LV V1 VTI: 18.3 cm  CO(LVOT): 4.1 l/min  CI(LVOT): 1.9 l/min/m2  SV(LVOT): 69.5 ml  SI(LVOT): 32.1 ml/m2  PA V2 max: 104.5 cm/sec  PA max P.4 mmHg  PA acc time: 0.07 sec  TR max smooth: 258.8 cm/sec  TR max P.8 mmHg  Pulm Sys Smooth: 33.1 cm/sec  Pulm Lund Smooth: 22.7 cm/sec  Pulm S/D: 1.5  ELIANE Index (cm2/m2): 0.74  E/E' av.7  Lateral E/e': 6.3  Medial E/e': 7.1        _____________________________________________________________________________  __        Report approved by: Del Martinez 2017 10:45 AM        CTA angio: 1/3/2018 IMPRESSION: Normal pulmonary venous anatomy with all pulmonary veins draining into the left atrium    Assessment and recommendations:    SVT:    1.  Continue to monitor at this  time. Ablations may be considered if he develops symptomatic episodes.     ATRIAL FIBRILLATION:  1. Stroke Prophylaxis:  CHADSVASC=age+, HTN+, DM+, DVT++ = 5  5, corresponding to a 6.7% annual stroke / systemic emolism event rate. indicating need for long term oral anticoagulation.  INRs have been [place], no problems with bleeding, continue warfarin.  2. Rate Control: Rhythm control method utilized.  Rates will be evaluated with zio in one month.   3. Rhythm Control: Continue sotalol 40 mg twice daily. An ablation can be considered for any recurrence.     S/P ablation:    1. Zio patch in one month.      2. Follow up with Dr. Matt in 2 months.      Patient expresses understanding and agreement with the plan.    I appreciate the chance to help with Haresh snell. Please let me know if you have any questions or concerns.    Farrah POND, CARLITO-C

## 2018-02-05 NOTE — NURSING NOTE
Chief Complaint   Patient presents with     RECHECK     OV with Farrah Bowen CNP for a 1 month follow up post AT ablation. Pt states a few episodes of a rapid heart rate since his procedure. He gets lightheaded if he gets up to quick, it passes shortly after.       Initial /69 (BP Location: Left arm, Patient Position: Chair, Cuff Size: Adult Regular)  Pulse 84  Wt 93 kg (205 lb)  SpO2 98%  BMI 27.8 kg/m2 Estimated body mass index is 27.8 kg/(m^2) as calculated from the following:    Height as of 1/24/18: 1.829 m (6').    Weight as of this encounter: 93 kg (205 lb)..  BP completed using cuff size: regular    Annabelle Viera MA

## 2018-02-05 NOTE — PROGRESS NOTES
Clinical Cardiac Electrophysiology    Chief Complaint: Follow up post ablation, PAF, SVT    HPI: Mr. Rock is a 67 year old male with a past medical history significant for DM2, antiphospholipid antibody syndrome, AT, PAF (CHADSVASC 5 on warfarin) s/p PVI 3/2013, HTN, CKD II, DVT/PE, Hodgkin's disease, s/p BMT, and primary pulmonary HTN. He had an EP visit in October 2016 for episodes of palpitations and higher heart rates. A zio patch showed multiple episodes of AT up to 2 hours with  bpm. He was started on Metoprolol. He has also been on sotalol 40 mg BID. He followed up with Dr. Matt and decided to pursue ablation. On 1/4/2018, patient underwent a successful re-isolation of LPVs and ablation for RA focal AT. 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 Metoprolol was discontinued and sotalol was continued.     Haresh Rock  presents s/p reisloation PVI and right atrial focal AT ablation  on 1/4/2018  for PAF and AT. Patient states has resumed normal activity. Denies chest pain or pressure, headaches, syncope, angina, dyspnea at rest or with exertion, dry cough, orthopnea, PND, abdominal pain, abdominal edema, pedal edema, or claudication.  Denies easy bruising or bleeding, hematuria, hematochezia, and epistaxis. Denies signs/symptoms of stroke such as visual disturbance, difficulty speaking, facial drooping, confusion, problems with gait, or any new numbness or weakness. Denies dysphagia, fever, chills, nausea, or vomiting. States that incision site is well healed without drainage, redness, warmth, or pain.    Mr. Rock states that he has one symptomatic episode of palpitations that was 2 days after his ablation and lasted 20 minutes.  He has had about 10 episodes over the past month of asymptomatic heart rates between 100-120 on  his Apple watch that lasted for a few minutes.  Patient states that he has a second of dizziness when he goes from sitting to standing that has been unchanged for the past several years.    Current cardiac medications: Sotalol 40 mg twice daily, simvastatin 10mg daily, warfarin      Current Outpatient Prescriptions   Medication Sig Dispense Refill     simvastatin (ZOCOR) 10 MG tablet TAKE 1 TABLET (10 MG) BY MOUTH AT BEDTIME 90 tablet 3     sotalol (BETAPACE) 80 MG tablet Take 0.5 tablets (40 mg) by mouth 2 times daily Follow up visit needed with Dr Matt 90 tablet 1     PREVIDENT 5000 DRY MOUTH 1.1 % GEL topical gel Take by mouth daily  3     folic acid (FOLVITE) 1 MG tablet Take 1 tablet (1,000 mcg) by mouth daily 90 tablet 3     pantoprazole (PROTONIX) 20 MG EC tablet Take 1 tablet (20 mg) by mouth daily (Patient taking differently: Take 20 mg by mouth At Bedtime ) 90 tablet 3     warfarin (COUMADIN) 5 MG tablet 5 mg on Tue, Thu; 2.5 mg all other days or as directed by coumadin clinic 60 tablet 5     Pseudoeph-Doxylamine-DM-APAP (NYQUIL PO) Take by mouth as needed       ARTIFICIAL TEAR SOLUTION OP Apply  to eye. 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     Hemochromatosis      Hodgkin's disease NOS     5 cycles of ABVD in 2011     HTN (hypertension)      Hyperlipidaemia LDL goal <100      Myeloproliferative disorder (H)     atypical, [facility]     PE (pulmonary  embolism) 11/2004     Polyneuropathy      Primary pulmonary hypertension (H)        Past Surgical History:   Procedure Laterality Date     ANESTHESIA CARDIOVERSION  4/21/2011    Procedure:ANESTHESIA CARDIOVERSION; Surgeon:GENERIC ANESTHESIA PROVIDER; Location:[facility] OR     ARTHROSCOPY KNEE RT/LT      LT     CATARACT IOL, RT/LT       COLONOSCOPY  10/16/2012    Procedure: COLONOSCOPY;  Colonoscopy, screening;  Surgeon: Reg Feliciano MD;  Location: [facility] OR     H ABLATION FOCAL AFIB  3/5/2013    PVI     H ABLATION FOCAL AFIB  01/01/2018     HC BONE MARROW/STEM TRANSPLANT ALLOGENIC  5/8/2007     INSERT PORT VASCULAR ACCESS       JOINT REPLACEMTN, KNEE RT/LT  3/28/12    SHAWN     VASECTOMY  1990       Family History   Problem Relation Age of Onset     Hypertension Mother      CEREBROVASCULAR DISEASE Mother      Arrhythmia Brother      Arrhythmia Sister      Alzheimer Disease Father      DIABETES Paternal Aunt      GASTROINTESTINAL DISEASE Maternal Grandmother      colitis      Thyroid Disease Sister      CANCER No family hx of      C.A.D. No family hx of      Thyroid Cancer No family hx of        Social History   Substance Use Topics     Smoking status: Never Smoker     Smokeless tobacco: Never Used     Alcohol use No       Allergies   Allergen Reactions     No Known Drug Allergy          ROS:   Negative except for as indicated in HPI.    Physical Examination:  Vitals: /69 (BP Location: Left arm, Patient Position: Chair, Cuff Size: Adult Regular)  Pulse 84  Wt 93 kg (205 lb)  SpO2 98%  BMI 27.8 kg/m2  BMI= Body mass index is 27.8 kg/(m^2).    GENERAL APPEARANCE: healthy, alert, and no acute distress  HEENT: no icterus, no xanthelasmas, normal pupil size and reaction, no cyanosis.  NECK: no asymmetry, thyroid normal to palpation, carotid upstrokes are normal bilaterally, no cervical bruits, no JVD   CHEST: lungs clear to auscultation - no rales, rhonchi or wheezes, no use of accessory muscles, no retractions,  respirations are unlabored, normal respiratory rate  CARDIOVASCULAR: regular rhythm, Grade I murmur at upper RSB, no S3 or S4 and no murmur, click or rub, precordium quiet with normal PMI.  ABDOMEN: soft, non-tender, without hepatosplenomegaly, no masses palpable, bowel sounds normal, aorta not enlarged by palpation, no abdominal bruits  EXTREMITIES: no clubbing, cyanosis, or edema  NEURO: alert and oriented to person/place/time, normal speech, gait and affect  VASC: Radial, dorsalis pedis, and posterior tibialis pulses +2 and symmetric bilaterally.   SKIN: no ecchymoses, no rashes. Well healed incisions.    Laboratory:  Cholesterol (mg/dL)   Date Value   2018 122   2016 124   2015 126   2014 168     Cholesterol/HDL Ratio (no units)   Date Value   2015 4.1   2014 5.8 (H)   2013 5.0   2012 4.9     HDL Cholesterol (mg/dL)   Date Value   2018 31 (L)   2016 27 (L)   2015 31 (L)   2014 29 (L)     LDL Cholesterol Calculated (mg/dL)   Date Value   2018 69   2016 52   2015 71   2014 104     EK2018 NSR  ECHO:   Recent Results (from the past 8760 hour(s))   Echo complete with definity    Narrative    [id number]  [id number]  [id number]  [id number]^[email or web address]^REYNA^JANAK           [facility]  [facility]  [address] [place] [address]     Name: PAMELA GRANADOS  MRN: [id number]  : 1950  Study Date: 2017 08:30 AM  Age: 67 yrs  Gender: Male  Patient Location: [facility]  Reason For Study: , Paroxysmal atrial fibrillation (H)  Ordering Physician: REYNA RAMSAY  Referring Physician: REYNA RAMSAY  Performed By: Donal Sy RDCS     BSA: 2.2 m2  Height: 72 in  Weight: 208 lb  HR: 60  BP: 127/71 mmHg  _____________________________________________________________________________  __        Procedure  Echocardiogram with two-dimensional,  color and spectral Doppler performed.  Contrast Definity. Definity (NDC #82437-899-72) given intravenously. Patient  was given 5ml mixture of 1.5ml Definity and 8.5ml saline. 5 ml wasted. IV  start location RAC .  _____________________________________________________________________________  __        Interpretation Summary  Normal left ventricular size, thickness, and systolic function. EF 55-60%.  Mild right ventricular dilation with normal function.  Moderate to severe calcification of the aortic valve with mild stenosis and  mild insufficiency. Low flow-low gradient AS should be considered, aortic  valve area is 1.6 cm2 and stroke volume is mildly reduced.  No pericardial effusion.     Compared to the prior study 3/4/2013, the aortic valve appears more calcified.  _____________________________________________________________________________  __        Left Ventricle  Left ventricular size is normal. Left ventricular wall thickness is normal.  Global and regional left ventricular function is normal with an EF of 55-60%.  Normal left ventricular filling for age.     Right Ventricle  Global right ventricular function is normal. Mild right ventricular dilation  is present.     Atria  Both atria appear normal.     Mitral Valve  Mild mitral annular calcification is present. Trace to mild mitral  insufficiency is present.        Aortic Valve  Moderate to severe aortic valve sclerosis is present. Mild aortic  insufficiency is present. The calculated aortic valve are is 1.6 cm^2.     Tricuspid Valve  Mild tricuspid insufficiency is present. Right ventricular systolic pressure  is 27mmHg above the right atrial pressure.     Pulmonic Valve  The pulmonic valve is normal.     Vessels  The aorta root is normal. The inferior vena cava cannot be assessed.     Pericardium  No pericardial effusion is present.        Attestation  I have personally viewed the imaging and agree with the interpretation and  report as documented by  the fellow, Jens Corley, and/or edited by me.  _____________________________________________________________________________  __  MMode/2D Measurements & Calculations  IVSd: 0.92 cm     LVIDd: 4.9 cm  LVIDs: 2.9 cm  LVPWd: 0.71 cm  FS: 40.1 %  EDV(Teich): 111.0 ml  ESV(Teich): 32.7 ml  LV mass(C)d: 133.4 grams  LV mass(C)dI: 61.6 grams/m2  Ao root diam: 3.2 cm  LA dimension: 3.7 cm  asc Aorta Diam: 3.0 cm  LA/Ao: 1.2  LVOT diam: 2.2 cm  LVOT area: 3.8 cm2  LA Volume (BP): 67.0 ml  LA Volume Index (BP): 30.9 ml/m2  RWT: 0.29           Doppler Measurements & Calculations  MV E max smooth: 52.6 cm/sec  MV A max smooth: 48.0 cm/sec  MV E/A: 1.1  MV dec time: 0.14 sec  Ao V2 max: 182.3 cm/sec  Ao max P.0 mmHg  Ao V2 mean: 127.0 cm/sec  Ao mean P.3 mmHg  Ao V2 VTI: 43.6 cm  ELIANE(I,D): 1.6 cm2  ELIANE(V,D): 1.5 cm2  LV V1 max P.2 mmHg  LV V1 max: 74.1 cm/sec  LV V1 VTI: 18.3 cm  CO(LVOT): 4.1 l/min  CI(LVOT): 1.9 l/min/m2  SV(LVOT): 69.5 ml  SI(LVOT): 32.1 ml/m2  PA V2 max: 104.5 cm/sec  PA max P.4 mmHg  PA acc time: 0.07 sec  TR max smooth: 258.8 cm/sec  TR max P.8 mmHg  Pulm Sys Smooth: 33.1 cm/sec  Pulm Lund Smooth: 22.7 cm/sec  Pulm S/D: 1.5  ELIANE Index (cm2/m2): 0.74  E/E' av.7  Lateral E/e': 6.3  Medial E/e': 7.1        _____________________________________________________________________________  __        Report approved by: Del Martinez 2017 10:45 AM        CTA angio: 1/3/2018 IMPRESSION: Normal pulmonary venous anatomy with all pulmonary veins draining into the left atrium    Assessment and recommendations:    SVT:    1.  Continue to monitor at this time. Ablations may be considered if he develops symptomatic episodes.     ATRIAL FIBRILLATION:  1. Stroke Prophylaxis:  CHADSVASC=age+, HTN+, DM+, DVT++ = 5  5, corresponding to a 6.7% annual stroke / systemic emolism event rate. indicating need for long term oral anticoagulation.  INRs have been theaputic, no problems with bleeding, continue  warfarin.  2. Rate Control: Rhythm control method utilized.  Rates will be evaluated with zio in one month.   3. Rhythm Control: Continue sotalol 40 mg twice daily. An ablation can be considered for any recurrence.     S/P ablation:    1. Zio patch in one month.      2. Follow up with Dr. Matt in 2 months.      Patient expresses understanding and agreement with the plan.    I appreciate the chance to help with Haresh snell. Please let me know if you have any questions or concerns.    Farrah POND, NP-C

## 2018-02-05 NOTE — PATIENT INSTRUCTIONS
Thank you for coming to the [facility] @ [place]; please note the following instructions:    1. Zio heart monitor will be mailed to you in one month.    2. Keep follow up with Dr. Matt in April.    If you have any questions regarding your visit please contact your care team:     Cardiology  Telephone Number   Tiffany PÉREZ, RN  Nina COLON, RN   Mandi RAMIREZ, QASIM ZELAYA, CHLOÉ NEFF MA   [phone]    *After hours: [phone]   For scheduling appts:     [phone] or    [phone] (select option 1)    *After hours: [phone]     For the Device Clinic (Pacemakers and ICD's)  RN's :  Chelsey Carrera   During business hours: [phone]    *After business hours:  [phone] (select option 4)      Normal test result notifications will be released via [email or web address] or mailed within 7 business days.  All other test results, will be communicated via telephone once reviewed by your cardiologist.    If you need a medication refill please contact your pharmacy.  Please allow 3 business days for your refill to be completed.    As always, thank you for trusting us with your health care needs!

## 2018-02-05 NOTE — MR AVS SNAPSHOT
After Visit Summary   2/5/2018    Haresh Rock    MRN: [id number]           Patient Information     Date Of Birth          1950        Visit Information        Provider Department      2/5/2018 10:00 AM Farrah Jain APRN CNP [facility]        Today's Diagnoses     Paroxysmal atrial fibrillation (H)    -  1    SVT (supraventricular tachycardia) (H)        S/P ablation operation for arrhythmia          Care Instructions    Thank you for coming to the [facility] @ [facility]; please note the following instructions:    1. Zio heart monitor will be mailed to you in one month.    2. Keep follow up with Dr. Matt in April.    If you have any questions regarding your visit please contact your care team:     Cardiology  Telephone Number   Tiffany PÉREZ, RN  Nina COLON, RN   Mandi RAMIREZ, QASIM ZELAYA, CHLOÉ NEFF MA   [phone]    *After hours: [phone]   For scheduling appts:     [phone] or    [phone] (select option 1)    *After hours: [phone]     For the Device Clinic (Pacemakers and ICD's)  RN's :  Chelsey Carrera   During business hours: [phone]    *After business hours:  [phone] (select option 4)      Normal test result notifications will be released via TermSync or mailed within 7 business days.  All other test results, will be communicated via telephone once reviewed by your cardiologist.    If you need a medication refill please contact your pharmacy.  Please allow 3 business days for your refill to be completed.    As always, thank you for trusting us with your health care needs!            Follow-ups after your visit        Follow-up notes from your care team     Return if symptoms worsen or fail to improve.      Your next 10 appointments already scheduled     Feb 21, 2018 10:30 AM CST   Anticoagulation [facility] with FZ ANTI COAG   [facility]     [address]  [place] [address]   [phone]            Apr 09, 2018  1:40 PM CDT   Return Visit with Bekah Matt MD   [facility] ([facility])    [address]  [place] [address]   [phone]            May 22, 2018 10:00 AM CDT   [facility] Lab Draw with  MASONIC LAB DRAW   [facility] Masonic Lab Draw ([facility])    [address]  [address]  [place] [id number] [phone]            May 22, 2018 10:30 AM CDT   Return with Augusto Miller MD   [facility] Blood and Marrow Transplant ([facility])    [address]  [address]  [place] [id number] [phone]              Who to contact     If you have questions or need follow up information about today's clinic visit or your schedule please contact [facility] directly at [phone].  Normal or non-critical lab and imaging results will be communicated to you by [employer], letter or phone within 4 business days after the clinic has received the results. If you do not hear from us within 7 days, please contact the clinic through [employer] or phone. If you have a critical or abnormal lab result, we will notify you by phone as soon as possible.  Submit refill requests through [email or web address] or call your pharmacy and they will forward the refill request to us. Please allow 3 business days for your refill to be completed.          Additional Information About Your Visit        [email or web address] Information     [email or web address] gives you secure access to your electronic health record. If you see a primary care provider, you can also send messages to your care team and make appointments. If you have questions, please call your primary care clinic.  If you do not have a primary care provider, please call [phone] and they will assist you.        Care EveryWhere ID     This is  your Care EveryWhere ID. This could be used by other organizations to access your [facility] medical records  [id number]        Your Vitals Were     Pulse Pulse Oximetry BMI (Body Mass Index)             84 98% 27.8 kg/m2          Blood Pressure from Last 3 Encounters:   02/05/18 109/69   01/24/18 110/72   01/05/18 129/68    Weight from Last 3 Encounters:   02/05/18 93 kg (205 lb)   01/24/18 93.9 kg (207 lb)   01/04/18 93.7 kg (206 lb 9.1 oz)              Today, you had the following     No orders found for display         Today's Medication Changes          These changes are accurate as of 2/5/18 11:03 AM.  If you have any questions, ask your nurse or doctor.               These medicines have changed or have updated prescriptions.        Dose/Directions    pantoprazole 20 MG EC tablet   Commonly known as:  PROTONIX   This may have changed:  when to take this   Used for:  Klulm-ofcxbu-bxuh disease, chronic (H), Hodgkin's disease of extranodal or solid organ site (H)        Dose:  20 mg   Take 1 tablet (20 mg) by mouth daily   Quantity:  90 tablet   Refills:  3                Primary Care Provider Office Phone # Fax #    Anya Nowak [phone] [address] [address]        Equal Access to Services     JOSELINE MONGE AH: Hadii sabas escalante hadasho Soomaali, waaxda luqadaha, qaybta kaalmada adeegyada, priya cunningham. So [facility] [phone].    ATENCIÓN: Si habla español, tiene a barger disposición servicios gratuitos de asistencia lingüística. Llame al [phone].    We comply with applicable federal civil rights laws and [place] laws. We do not discriminate on the basis of race, color, national origin, age, disability, sex, sexual orientation, or gender identity.            Thank you!     Thank you for choosing [facility]  for your care. Our goal is always to provide you with excellent care. Hearing back from  our patients is one way we can continue to improve our services. Please take a few minutes to complete the written survey that you may receive in the mail after your visit with us. Thank you!             Your Updated Medication List - Protect others around you: Learn how to safely use, store and throw away your medicines at www.disposemymeds.org.          This list is accurate as of 2/5/18 11:03 AM.  Always use your most recent med list.                   Brand Name Dispense Instructions for use Diagnosis    ARTIFICIAL TEAR SOLUTION OP      Apply  to eye. USE AS DIRECTED(DF)        CENTRUM PO      1 TABLET DAILY        folic acid 1 MG tablet    FOLVITE    90 tablet    Take 1 tablet (1,000 mcg) by mouth daily    Snrjo-ayewav-kdqm disease, chronic (H), Lymphoma (H)       NYQUIL PO      Take by mouth as needed        pantoprazole 20 MG EC tablet    PROTONIX    90 tablet    Take 1 tablet (20 mg) by mouth daily    Irfxl-nafmtw-xnle disease, chronic (H), Hodgkin's disease of extranodal or solid organ site (H)       PREVIDENT 5000 DRY MOUTH 1.1 % Gel topical gel   Generic drug:  sodium fluoride dental gel      Take by mouth daily    Hodgkin's disease of extranodal or solid organ site (H), Status post bone marrow transplant (H), Hereditary hemochromatosis (H), Thyroid nodule       simvastatin 10 MG tablet    ZOCOR    90 tablet    TAKE 1 TABLET (10 MG) BY MOUTH AT BEDTIME    Hyperlipidemia with target LDL less than 100       sotalol 80 MG tablet    BETAPACE    90 tablet    Take 0.5 tablets (40 mg) by mouth 2 times daily Follow up visit needed with Dr Matt    Atrial fibrillation (H)       warfarin 5 MG tablet    COUMADIN    60 tablet    5 mg on Tue, Thu; 2.5 mg all other days or as directed by coumadin clinic    Atrial fibrillation, unspecified type (H), Antiphospholipid antibody syndrome (H)

## 2018-02-13 DIAGNOSIS — I48.91 ATRIAL FIBRILLATION, UNSPECIFIED TYPE (H): ICD-10-CM

## 2018-02-13 DIAGNOSIS — D68.61 ANTIPHOSPHOLIPID ANTIBODY SYNDROME (H): ICD-10-CM

## 2018-02-15 NOTE — TELEPHONE ENCOUNTER
Coumadin being followed by the [facility].  Will route to them to address refill.    Tiffany Mckeon, RN  Care Coordinator  [facility]  [phone]

## 2018-02-16 DIAGNOSIS — C81.99 HODGKIN'S DISEASE OF EXTRANODAL OR SOLID ORGAN SITE (H): ICD-10-CM

## 2018-02-16 DIAGNOSIS — D89.811 GRAFT-VERSUS-HOST DISEASE, CHRONIC (H): ICD-10-CM

## 2018-02-16 RX ORDER — PANTOPRAZOLE SODIUM 20 MG/1
20 TABLET, DELAYED RELEASE ORAL AT BEDTIME
Qty: 90 TABLET | Refills: 3 | Status: SHIPPED | OUTPATIENT
Start: 2018-02-16 | End: 2019-02-22

## 2018-02-16 RX ORDER — WARFARIN SODIUM 5 MG/1
TABLET ORAL
Qty: 60 TABLET | Refills: 1 | Status: SHIPPED | OUTPATIENT
Start: 2018-02-16 | End: 2018-07-27

## 2018-02-21 ENCOUNTER — ANTICOAGULATION THERAPY VISIT (OUTPATIENT)
Dept: NURSING | Facility: CLINIC | Age: 68
End: 2018-02-21
Payer: COMMERCIAL

## 2018-02-21 DIAGNOSIS — I48.0 PAROXYSMAL ATRIAL FIBRILLATION (H): ICD-10-CM

## 2018-02-21 DIAGNOSIS — Z79.01 LONG-TERM (CURRENT) USE OF ANTICOAGULANTS: ICD-10-CM

## 2018-02-21 LAB — INR POINT OF CARE: 2.3 (ref 0.86–1.14)

## 2018-02-21 PROCEDURE — 85610 PROTHROMBIN TIME: CPT | Mod: QW

## 2018-02-21 PROCEDURE — 99207 ZZC NO CHARGE NURSE ONLY: CPT

## 2018-02-21 PROCEDURE — 36416 COLLJ CAPILLARY BLOOD SPEC: CPT

## 2018-02-21 NOTE — MR AVS SNAPSHOT
Haresh Rock   2/21/2018 10:30 AM   Anticoagulation Therapy Visit    Description:  67 year old male   Provider:  TY ANTI COAG   Department:  Ty Nurse           INR as of 2/21/2018     Today's INR 2.3      Anticoagulation Summary as of 2/21/2018     INR goal 2.0-3.0   Today's INR 2.3   Full instructions 5 mg on Tue, Thu; 2.5 mg all other days   Next INR check 4/4/2018    Indications   Long-term (current) use of anticoagulants [Z79.01] [Z79.01]  Atrial fibrillation (H) [I48.91]         Your next Anticoagulation Clinic appointment(s)     Apr 04, 2018 10:45 AM CDT   Anticoagulation Visit with TY ANTI COAAYUSH   [facility] ([address] [address] [phone]              Contact Numbers     [facility]  Please call [phone] to cancel and/or reschedule your appointment   Please call [phone] with any problems or questions regarding your therapy.        February 2018 Details    Sun Mon Tue Wed Thu Fri Sat         1               2               3                 4               5               6               7               8               9               10                 11               12               13               14               15               16               17                 18               19               20               21      2.5 mg   See details      22      5 mg         23      2.5 mg         24      2.5 mg           25      2.5 mg         26      2.5 mg         27      5 mg         28      2.5 mg             Date Details   02/21 This INR check               How to take your warfarin dose     To take:  2.5 mg Take 0.5 of a 5 mg tablet.    To take:  5 mg Take 1 of the 5 mg tablets.           March 2018 Details    Sun Mon Tue Wed Thu Fri Sat         1      5 mg         2      2.5 mg         3      2.5 mg           4      2.5 mg         5      2.5 mg         6      5 mg         7      2.5 mg         8      5 mg          9      2.5 mg         10      2.5 mg           11      2.5 mg         12      2.5 mg         13      5 mg         14      2.5 mg         15      5 mg         16      2.5 mg         17      2.5 mg           18      2.5 mg         19      2.5 mg         20      5 mg         21      2.5 mg         22      5 mg         23      2.5 mg         24      2.5 mg           25      2.5 mg         26      2.5 mg         27      5 mg         28      2.5 mg         29      5 mg         30      2.5 mg         31      2.5 mg          Date Details   No additional details            How to take your warfarin dose     To take:  2.5 mg Take 0.5 of a 5 mg tablet.    To take:  5 mg Take 1 of the 5 mg tablets.           April 2018 Details    Sun Mon Tue Wed Thu Fri Sat     1      2.5 mg         2      2.5 mg         3      5 mg         4            5               6               7                 8               9               10               11               12               13               14                 15               16               17               18               19               20               21                 22               23               24               25               26               27               28                 29               30                     Date Details   No additional details    Date of next INR:  4/4/2018         How to take your warfarin dose     To take:  2.5 mg Take 0.5 of a 5 mg tablet.    To take:  5 mg Take 1 of the 5 mg tablets.

## 2018-02-21 NOTE — PROGRESS NOTES
ANTICOAGULATION FOLLOW-UP CLINIC VISIT    Patient Name:  Haresh Rock  Date:  2/21/2018  Contact Type:  Face to Face    SUBJECTIVE:     Patient Findings     Positives No Problem Findings           OBJECTIVE    INR Protime   Date Value Ref Range Status   02/21/2018 2.3 (A) 0.86 - 1.14 Final     Factor 2 Assay   Date Value Ref Range Status   05/01/2007 58 (L) 60 - 140 % Final     Comment:     (Note)  CALLED TO [facility](INTERV. RADIOLOGY)[id number],        ASSESSMENT / PLAN  INR assessment THER    Recheck INR In: 6 WEEKS    INR Location Clinic      Anticoagulation Summary as of 2/21/2018     INR goal 2.0-3.0   Today's INR 2.3   Maintenance plan 5 mg (5 mg x 1) on Tue, Thu; 2.5 mg (5 mg x 0.5) all other days   Full instructions 5 mg on Tue, Thu; 2.5 mg all other days   Weekly total 22.5 mg   No change documented Laverne Ferguson RN   Plan last modified Belinda Francis RN (4/19/2016)   Next INR check 4/4/2018   Priority INR   Target end date Indefinite    Indications   Long-term (current) use of anticoagulants [Z79.01] [Z79.01]  Atrial fibrillation (H) [I48.91]         Anticoagulation Episode Summary     INR check location     Preferred lab     Send INR reminders to [facility]    Comments       Anticoagulation Care Providers     Provider Role Specialty Phone number    Anya Nowak MD [facility] [phone]            See the Encounter Report to view Anticoagulation Flowsheet and Dosing Calendar (Go to Encounters tab in chart review, and find the Anticoagulation Therapy Visit)    Dosage adjustment made based on physician directed care plan.    Laverne Ferguson, KOSTAS

## 2018-03-09 ENCOUNTER — ALLIED HEALTH/NURSE VISIT (OUTPATIENT)
Dept: CARDIOLOGY | Facility: CLINIC | Age: 68
End: 2018-03-09
Attending: INTERNAL MEDICINE
Payer: COMMERCIAL

## 2018-03-09 DIAGNOSIS — I47.10 PAROXYSMAL SUPRAVENTRICULAR TACHYCARDIA (H): ICD-10-CM

## 2018-03-09 PROCEDURE — 0298T ZZC EXT ECG > 48HR TO 21 DAY REVIEW AND INTERPRETATN: CPT | Mod: ZP | Performed by: INTERNAL MEDICINE

## 2018-03-09 NOTE — NURSING NOTE
~Ziopatch monitor was mailed out.~    [id number]  Per Bekah Cowan patient to have 14 days ziopatch monitor placed.  Diagnosis: Paroxsymal SVT  Monitor placed: No  - MAIL OUT    [id number]

## 2018-03-09 NOTE — MR AVS SNAPSHOT
After Visit Summary   3/9/2018    Haresh Rock    MRN: [id number]           Patient Information     Date Of Birth          1950        Visit Information        Provider Department      3/9/2018 7:00 AM Tech, [facility] C[facility] Monitor, [facility]        Today's Diagnoses     Paroxysmal supraventricular tachycardia (H)           Follow-ups after your visit        Your next 10 appointments already scheduled     Apr 04, 2018 10:45 AM CDT   [facility] with FZ ANTI COAG   [facility]    [address] [address]   [phone]            Apr 09, 2018  1:40 PM CDT   Return Visit with Bekah Matt MD   [facility] ([facility])    [address]  [place] [address] [phone]            May 22, 2018 10:00 AM CDT   [facility] Lab Draw with  [facility] LAB DRAW   [facility] Lab Draw ([facility])    [address]  [address]  [place] [address] [phone]            May 22, 2018 10:30 AM CDT   Return with Augusto Miller MD   [facility] Blood and Marrow Transplant ([facility])    [address]  [address]  [place] [address] [phone]              Who to contact     If you have questions or need follow up information about today's clinic visit or your schedule please contact [facility] directly at [phone].  Normal or non-critical lab and imaging results will be communicated to you by MyChart, letter or phone within 4 business days after the clinic has received the results. If you do not hear from us within 7 days, please contact the clinic through MyChart or phone. If you have a critical or abnormal lab result, we will notify you by phone as soon as possible.  Submit refill requests through [employer] or call your pharmacy and they will forward the refill  request to us. Please allow 3 business days for your refill to be completed.          Additional Information About Your Visit        [employer] Information     [employer] gives you secure access to your electronic health record. If you see a primary care provider, you can also send messages to your care team and make appointments. If you have questions, please call your primary care clinic.  If you do not have a primary care provider, please call [phone] and they will assist you.        Care EveryWhere ID     This is your Care EveryWhere ID. This could be used by other organizations to access your [facility] medical records  [id number]         Blood Pressure from Last 3 Encounters:   02/05/18 109/69   01/24/18 110/72   01/05/18 129/68    Weight from Last 3 Encounters:   02/05/18 93 kg (205 lb)   01/24/18 93.9 kg (207 lb)   01/04/18 93.7 kg (206 lb 9.1 oz)              We Performed the Following     Zio Patch Holter        Primary Care Provider Office Phone # Fax #    Anya Nowak [phone] [address] [address]        Equal Access to Services     [facility]: Hadii aad ku hadasho Soomaali, waaxda luqadaha, qaybta kaalmada adeegyada, priya doughertyn cindi baron . So [facility] [phone].    ATENCIÓN: Si habla español, tiene a barger disposición servicios gratuitos de asistencia lingüística. Llame al [phone].    We comply with applicable federal civil rights laws and [place] laws. We do not discriminate on the basis of race, color, national origin, age, disability, sex, sexual orientation, or gender identity.            Thank you!     Thank you for choosing [facility]  for your care. Our goal is always to provide you with excellent care. Hearing back from our patients is one way we can continue to improve our services. Please take a few minutes to complete the written survey that you may receive in the mail after your visit with us. Thank you!              Your Updated Medication List - Protect others around you: Learn how to safely use, store and throw away your medicines at www.disposemymeds.org.          This list is accurate as of 3/9/18 11:47 AM.  Always use your most recent med list.                   Brand Name Dispense Instructions for use Diagnosis    ARTIFICIAL TEAR SOLUTION OP      Apply  to eye. USE AS DIRECTED(DF)        CENTRUM PO      1 TABLET DAILY        folic acid 1 MG tablet    FOLVITE    90 tablet    Take 1 tablet (1,000 mcg) by mouth daily    Oerrb-xrjsly-mbww disease, chronic (H), Lymphoma (H)       NYQUIL PO      Take by mouth as needed        pantoprazole 20 MG EC tablet    PROTONIX    90 tablet    Take 1 tablet (20 mg) by mouth At Bedtime    Slgzu-mdcxhy-nrpa disease, chronic (H), Hodgkin's disease of extranodal or solid organ site (H)       PREVIDENT 5000 DRY MOUTH 1.1 % Gel topical gel   Generic drug:  sodium fluoride dental gel      Take by mouth daily    Hodgkin's disease of extranodal or solid organ site (H), Status post bone marrow transplant (H), Hereditary hemochromatosis (H), Thyroid nodule       simvastatin 10 MG tablet    ZOCOR    90 tablet    TAKE 1 TABLET (10 MG) BY MOUTH AT BEDTIME    Hyperlipidemia with target LDL less than 100       sotalol 80 MG tablet    BETAPACE    90 tablet    Take 0.5 tablets (40 mg) by mouth 2 times daily Follow up visit needed with Dr Matt    Atrial fibrillation (H)       warfarin 5 MG tablet    COUMADIN    60 tablet    5 mg on Tue, Thu; 2.5 mg all other days or as directed by coumadin clinic    Atrial fibrillation, unspecified type (H), Antiphospholipid antibody syndrome (H)

## 2018-04-06 DIAGNOSIS — I48.91 ATRIAL FIBRILLATION (H): ICD-10-CM

## 2018-04-06 RX ORDER — SOTALOL HYDROCHLORIDE 80 MG/1
40 TABLET ORAL 2 TIMES DAILY
Qty: 30 TABLET | Refills: 1 | Status: SHIPPED | OUTPATIENT
Start: 2018-04-06 | End: 2018-04-09

## 2018-04-06 NOTE — PROGRESS NOTES
Patient called triage needing refill. States he is all out as of today and [facility] has been trying to get hold of us. Noted order states needs follow up with Dr Matt. Has appt Monday with Dr Matt. Filled 1 month supply so he doesn't run out and will see Dr Matt Monday.   Mayi Núñez RN

## 2018-04-09 ENCOUNTER — OFFICE VISIT (OUTPATIENT)
Dept: CARDIOLOGY | Facility: CLINIC | Age: 68
End: 2018-04-09
Payer: COMMERCIAL

## 2018-04-09 VITALS
HEART RATE: 73 BPM | BODY MASS INDEX: 28.07 KG/M2 | WEIGHT: 207 LBS | SYSTOLIC BLOOD PRESSURE: 116 MMHG | DIASTOLIC BLOOD PRESSURE: 78 MMHG | OXYGEN SATURATION: 96 %

## 2018-04-09 DIAGNOSIS — D89.811 GRAFT-VERSUS-HOST DISEASE, CHRONIC (H): ICD-10-CM

## 2018-04-09 DIAGNOSIS — C85.90 LYMPHOMA, UNSPECIFIED BODY REGION, UNSPECIFIED LYMPHOMA TYPE (H): ICD-10-CM

## 2018-04-09 DIAGNOSIS — I48.0 PAROXYSMAL ATRIAL FIBRILLATION (H): ICD-10-CM

## 2018-04-09 PROCEDURE — 99214 OFFICE O/P EST MOD 30 MIN: CPT | Performed by: NURSE PRACTITIONER

## 2018-04-09 RX ORDER — FOLIC ACID 1 MG/1
1000 TABLET ORAL DAILY
Qty: 90 TABLET | Refills: 3 | Status: SHIPPED | OUTPATIENT
Start: 2018-04-09 | End: 2019-04-05

## 2018-04-09 RX ORDER — SOTALOL HYDROCHLORIDE 80 MG/1
40 TABLET ORAL 2 TIMES DAILY
Qty: 90 TABLET | Refills: 3 | Status: SHIPPED | OUTPATIENT
Start: 2018-04-09 | End: 2018-09-18

## 2018-04-09 NOTE — NURSING NOTE
Chief Complaint   Patient presents with     RECHECK     3 month follow up for AT ablation. Zio results/dc. Pt reports having a rapid heart rate, sometimes feels lightheaded.       Initial /78 (BP Location: Right arm, Patient Position: Chair, Cuff Size: Adult Large)  Pulse 73  Wt 93.9 kg (207 lb)  SpO2 96%  BMI 28.07 kg/m2 Estimated body mass index is 28.07 kg/(m^2) as calculated from the following:    Height as of 1/24/18: 1.829 m (6').    Weight as of this encounter: 93.9 kg (207 lb)..  BP completed using cuff size: josi Viera MA

## 2018-04-09 NOTE — PROGRESS NOTES
Clinical Cardiac Electrophysiology     Chief Complaint: Follow up post ablation, PAF, SVT     HPI: Haresh Rock  presents s/p reisloation PVI and right atrial focal AT ablation  on 1/4/2018  for PAF and AT. Mr. Rock is a 67 year old male with a past medical history significant for DM2, antiphospholipid antibody syndrome, AT, PAF (CHADSVASC 5 on warfarin) s/p PVI 3/2013, HTN, CKD II, DVT/PE, Hodgkin's disease, s/p BMT, and primary pulmonary HTN. He had an EP visit in October 2016 for episodes of palpitations and higher heart rates. A zio patch showed multiple episodes of AT up to 2 hours with  bpm. He was started on Metoprolol. He has also been on sotalol 40 mg BID. He followed up with Dr. Matt and decided to pursue ablation. On 1/4/2018, patient underwent a successful re-isolation of LPVs and ablation for RA focal AT. 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 Metoprolol was discontinued and sotalol was continued. Zio 3/9/2018 showed an episode sustained SVT.      States that he notes episodes of on his apple iPhone of heart rates up to 120s that last for a few minutes to half an hour. States that he is not bothered by episodes and usually doesn't feel any symptoms but once a day or less he notes palpitations or lightheadedness that for a few seconds to 30 seconds. Notes a little lightheadedness when he stands up for one second.  Patient states has resumed normal activity.  States that he is feeling well. Denies chest pain or pressure, headaches, syncope, angina, dyspnea at rest or with exertion, dry cough, orthopnea, PND, abdominal pain, abdominal edema, pedal edema, or claudication.  Denies easy bruising or bleeding, hematuria, hematochezia, and epistaxis. Denies signs/symptoms of stroke such as visual disturbance, difficulty  speaking, facial drooping, confusion, problems with gait, or any new numbness or weakness. Denies dysphagia, fever, chills, nausea, or vomiting. States that incision site is well healed without drainage, redness, warmth, or pain.     Current cardiac medications: Sotalol 40 mg twice daily, simvastatin 10mg daily, warfarin      Current Outpatient Prescriptions           Current Outpatient Prescriptions   Medication Sig Dispense Refill     simvastatin (ZOCOR) 10 MG tablet TAKE 1 TABLET (10 MG) BY MOUTH AT BEDTIME 90 tablet 3     sotalol (BETAPACE) 80 MG tablet Take 0.5 tablets (40 mg) by mouth 2 times daily Follow up visit needed with Dr Matt 90 tablet 1     PREVIDENT 5000 DRY MOUTH 1.1 % GEL topical gel Take by mouth daily   3     folic acid (FOLVITE) 1 MG tablet Take 1 tablet (1,000 mcg) by mouth daily 90 tablet 3     pantoprazole (PROTONIX) 20 MG EC tablet Take 1 tablet (20 mg) by mouth daily (Patient taking differently: Take 20 mg by mouth At Bedtime ) 90 tablet 3     warfarin (COUMADIN) 5 MG tablet 5 mg on Tue, Thu; 2.5 mg all other days or as directed by coumadin clinic 60 tablet 5     Pseudoeph-Doxylamine-DM-APAP (NYQUIL PO) Take by mouth as needed         ARTIFICIAL TEAR SOLUTION OP Apply  to eye. USE AS DIRECTED(DF)         CENTRUM OR 1 TABLET DAILY                 Past Medical History         Past Medical History:   Diagnosis Date     Antiphospholipid antibody syndrome (H) 2005     Ravi's     Atrial fibrillation (H) 4/2011     Cirrhosis (H)       CKD (chronic kidney disease) stage 1, GFR 90 ml/min or greater       CKD (chronic kidney disease) stage 2, GFR 60-89 ml/min       Diabetes mellitus type II       steroid induced     DJD (degenerative joint disease) of knee       SHAWN, worse on right     DVT (deep venous thrombosis) (H)       ED (erectile dysfunction)       Gallbladder polyp 5/2010     Qapii-Jippmc-Irkg Disease 2007     BMT     Hemochromatosis       Hodgkin's disease NOS       5 cycles of ABVD in 2011      HTN (hypertension)       Hyperlipidaemia LDL goal <100       Myeloproliferative disorder (H)       atypical, [facility]     PE (pulmonary embolism) 11/2004     Polyneuropathy       Primary pulmonary hypertension (H)               Past Surgical History          Past Surgical History:   Procedure Laterality Date     ANESTHESIA CARDIOVERSION   4/21/2011     Procedure:ANESTHESIA CARDIOVERSION; Surgeon:GENERIC ANESTHESIA PROVIDER; Location:[facility] OR     ARTHROSCOPY KNEE RT/LT         LT     CATARACT IOL, RT/LT         COLONOSCOPY   10/16/2012     Procedure: COLONOSCOPY;  Colonoscopy, screening;  Surgeon: Reg Feliciano MD;  Location: [facility] OR     H ABLATION FOCAL AFIB   3/5/2013     PVI     H ABLATION FOCAL AFIB   01/01/2018     HC BONE MARROW/STEM TRANSPLANT ALLOGENIC   5/8/2007     INSERT PORT VASCULAR ACCESS         JOINT REPLACEMTN, KNEE RT/LT   3/28/12     SHAWN     VASECTOMY   1990             Family History    Family History   Problem Relation Age of Onset     Hypertension Mother       CEREBROVASCULAR DISEASE Mother       Arrhythmia Brother       Arrhythmia Sister       Alzheimer Disease Father       DIABETES Paternal Aunt       GASTROINTESTINAL DISEASE Maternal Grandmother         colitis      Thyroid Disease Sister       CANCER No family hx of       C.A.D. No family hx of       Thyroid Cancer No family hx of                   Social History   Substance Use Topics     Smoking status: Never Smoker     Smokeless tobacco: Never Used     Alcohol use No              Allergies   Allergen Reactions     No Known Drug Allergy              ROS:   Negative except for as indicated in HPI.     Physical Examination:  Vitals: /69 (BP Location: Left arm, Patient Position: Chair, Cuff Size: Adult Regular)  Pulse 84  Wt 93 kg (205 lb)  SpO2 98%  BMI 27.8 kg/m2  BMI= Body mass index is 27.8 kg/(m^2).     GENERAL APPEARANCE: healthy, alert, and no acute distress  HEENT: no icterus, no xanthelasmas, normal pupil size and  reaction, no cyanosis.  NECK: no asymmetry, thyroid normal to palpation, carotid upstrokes are normal bilaterally, no cervical bruits, no JVD   CHEST: lungs clear to auscultation - no rales, rhonchi or wheezes, no use of accessory muscles, no retractions, respirations are unlabored, normal respiratory rate  CARDIOVASCULAR: regular rhythm, Grade I murmur at upper RSB, no S3 or S4 and no murmur, click or rub, precordium quiet with normal PMI.  ABDOMEN: soft, non-tender, without hepatosplenomegaly, no masses palpable, bowel sounds normal, aorta not enlarged by palpation, no abdominal bruits  EXTREMITIES: no clubbing, cyanosis, or edema  NEURO: alert and oriented to person/place/time, normal speech, gait and affect  VASC: Radial, dorsalis pedis, and posterior tibialis pulses +2 and symmetric bilaterally.   SKIN: no ecchymoses, no rashes.      Laboratory:      Cholesterol (mg/dL)   Date Value   2018 122   2016 124   2015 126   2014 168          Cholesterol/HDL Ratio (no units)   Date Value   2015 4.1   2014 5.8 (H)   2013 5.0   2012 4.9          HDL Cholesterol (mg/dL)   Date Value   2018 31 (L)   2016 27 (L)   2015 31 (L)   2014 29 (L)      LDL Cholesterol Calculated (mg/dL)   Date Value   2018 69   2016 52   2015 71   2014 104      EK2018 NSR  ECHO:       Recent Results (from the past 8760 hour(s))   Echo complete with definity     Narrative     [id number]  [id number]  [id number]  [id number]^DEVENDRA^REYNA^JANAK              [facility]  Diagnostic and Treamtent-3rd Floor  [address] [place] [address]      Name: PAMELA GRANADOS  MRN: [id number]  : 1950  Study Date: 2017 08:30 AM  Age: 67 yrs  Gender: Male  Patient Location: [facility]  Reason For Study: , Paroxysmal atrial fibrillation (H)  Ordering Physician: REYNA RAMSAY  Referring Physician: DEVENDRA  REYNA BRICENO  Performed By: Donal Sy RDCS      BSA: 2.2 m2  Height: 72 in  Weight: 208 lb  HR: 60  BP: 127/71 mmHg  _____________________________________________________________________________  __          Procedure  Echocardiogram with two-dimensional, color and spectral Doppler performed.  Contrast Definity. Definity (NDC #93654-655-01) given intravenously. Patient  was given 5ml mixture of 1.5ml Definity and 8.5ml saline. 5 ml wasted. IV  start location RAC .  _____________________________________________________________________________  __          Interpretation Summary  Normal left ventricular size, thickness, and systolic function. EF 55-60%.  Mild right ventricular dilation with normal function.  Moderate to severe calcification of the aortic valve with mild stenosis and  mild insufficiency. Low flow-low gradient AS should be considered, aortic  valve area is 1.6 cm2 and stroke volume is mildly reduced.  No pericardial effusion.      Compared to the prior study 3/4/2013, the aortic valve appears more calcified.  _____________________________________________________________________________  __          Left Ventricle  Left ventricular size is normal. Left ventricular wall thickness is normal.  Global and regional left ventricular function is normal with an EF of 55-60%.  Normal left ventricular filling for age.      Right Ventricle  Global right ventricular function is normal. Mild right ventricular dilation  is present.      Atria  Both atria appear normal.      Mitral Valve  Mild mitral annular calcification is present. Trace to mild mitral  insufficiency is present.          Aortic Valve  Moderate to severe aortic valve sclerosis is present. Mild aortic  insufficiency is present. The calculated aortic valve are is 1.6 cm^2.      Tricuspid Valve  Mild tricuspid insufficiency is present. Right ventricular systolic pressure  is 27mmHg above the right atrial pressure.      Pulmonic Valve  The  pulmonic valve is normal.      Vessels  The aorta root is normal. The inferior vena cava cannot be assessed.      Pericardium  No pericardial effusion is present.          Attestation  I have personally viewed the imaging and agree with the interpretation and  report as documented by the fellow, Jens Corley, and/or edited by me.  _____________________________________________________________________________  __  MMode/2D Measurements & Calculations  IVSd: 0.92 cm      LVIDd: 4.9 cm  LVIDs: 2.9 cm  LVPWd: 0.71 cm  FS: 40.1 %  EDV(Teich): 111.0 ml  ESV(Teich): 32.7 ml  LV mass(C)d: 133.4 grams  LV mass(C)dI: 61.6 grams/m2  Ao root diam: 3.2 cm  LA dimension: 3.7 cm  asc Aorta Diam: 3.0 cm  LA/Ao: 1.2  LVOT diam: 2.2 cm  LVOT area: 3.8 cm2  LA Volume (BP): 67.0 ml  LA Volume Index (BP): 30.9 ml/m2  RWT: 0.29              Doppler Measurements & Calculations  MV E max smooth: 52.6 cm/sec  MV A max smooth: 48.0 cm/sec  MV E/A: 1.1  MV dec time: 0.14 sec  Ao V2 max: 182.3 cm/sec  Ao max P.0 mmHg  Ao V2 mean: 127.0 cm/sec  Ao mean P.3 mmHg  Ao V2 VTI: 43.6 cm  ELIANE(I,D): 1.6 cm2  ELIANE(V,D): 1.5 cm2  LV V1 max P.2 mmHg  LV V1 max: 74.1 cm/sec  LV V1 VTI: 18.3 cm  CO(LVOT): 4.1 l/min  CI(LVOT): 1.9 l/min/m2  SV(LVOT): 69.5 ml  SI(LVOT): 32.1 ml/m2  PA V2 max: 104.5 cm/sec  PA max P.4 mmHg  PA acc time: 0.07 sec  TR max smooth: 258.8 cm/sec  TR max P.8 mmHg  Pulm Sys Smooth: 33.1 cm/sec  Pulm Lund Smooth: 22.7 cm/sec  Pulm S/D: 1.5  ELIANE Index (cm2/m2): 0.74  E/E' av.7  Lateral E/e': 6.3  Medial E/e': 7.1          _____________________________________________________________________________  __          Report approved by: Del Martinez 2017 10:45 AM          CTA angio: 1/3/2018 IMPRESSION: Normal pulmonary venous anatomy with all pulmonary veins draining into the left atrium     Assessment and recommendations:     #1. ATRIAL FIBRILLATION: Patient is happy with how he feels and does not wish changes to his  treatment plan. Discussed with patient that if he notes increasing or worsening symptoms he should seek medical attention.   1. Stroke Prophylaxis:  CHADSVASC=age+, HTN+, DM+, DVT++ = 5  5, corresponding to a 6.7% annual stroke / systemic emolism event rate. indicating need for long term oral anticoagulation.  INRs have been theaputic, no problems with bleeding, continue warfarin.  2. Rate Control: Rhythm control method utilized.   3. Rhythm Control: Continue sotalol 40 mg twice daily. A repeat ablation can be considered for any symptomatic recurrence.    Follow up: Follow up in one year or sooner as needed for symptoms or problems.      Patient expresses understanding and agreement with the plan.     I appreciate the chance to help with Haresh snell. Please let me know if you have any questions or concerns.     Farrah POND, NP-C

## 2018-04-09 NOTE — PATIENT INSTRUCTIONS
Thank you for coming to the [facility] @ [facility]; please note the following instructions:    1.  No medication changes.    2. Follow up in one year or sooner if need for symptoms or problems.       If you have any questions regarding your visit please contact your care team:     Cardiology  Telephone Number   Tiffany PÉREZ, RN  Nina COLON, RN   Mandi RAMIREZ, QASIM EZLAYA, CHLOÉ NEFF MA   [phone]    *After hours: [phone]   For scheduling appts:     [phone] or    [phone] (select option 1)    *After hours: [phone]     For the Device Clinic (Pacemakers and ICD's)  RN's :  Chelsey Carrera   During business hours: [phone]    *After business hours:  [phone] (select option 4)      Normal test result notifications will be released via [employer] or mailed within 7 business days.  All other test results, will be communicated via telephone once reviewed by your cardiologist.    If you need a medication refill please contact your pharmacy.  Please allow 3 business days for your refill to be completed.    As always, thank you for trusting us with your health care needs!

## 2018-04-09 NOTE — MR AVS SNAPSHOT
After Visit Summary   4/9/2018    Haresh Rock    MRN: [id number]           Patient Information     Date Of Birth          1950        Visit Information        Provider Department      4/9/2018 2:00 PM Farrah Jain APRN CNP [facility] PHYSICIANS HEART AT [facility]        Today's Diagnoses     Paroxysmal atrial fibrillation (H)          Care Instructions    Thank you for coming to the [facility] @ [facility]; please note the following instructions:    1.  No medication changes.    2. Follow up in one year or sooner if need for symptoms or problems.       If you have any questions regarding your visit please contact your care team:     Cardiology  Telephone Number   Tiffany GUZMAN., RN  Nina COLON, RN   Mandi RAMIREZ, QASIM ZELAYA, CHLOÉ NEFF MA   [phone]    *After hours: [phone]   For scheduling appts:     [phone] or    [phone] (select option 1)    *After hours: [phone]     For the Device Clinic (Pacemakers and ICD's)  RN's :  Chelsey Carrera   During business hours: [phone]    *After business hours:  [phone] (select option 4)      Normal test result notifications will be released via [employer] or mailed within 7 business days.  All other test results, will be communicated via telephone once reviewed by your cardiologist.    If you need a medication refill please contact your pharmacy.  Please allow 3 business days for your refill to be completed.    As always, thank you for trusting us with your health care needs!            Follow-ups after your visit        Additional Services     Follow-Up with EP Cardiac Advanced Practice Provider- 1 Year                 Your next 10 appointments already scheduled     May 22, 2018 10:00 AM T   [facility] with  [facility]   [facility] ([facility])    [address]  [address]  [place] [address] [phone]             May 22, 2018 10:30 AM CDT   Return with Augusto Miller MD   [facility] and Marrow Transplant ([facility])    [address]  [address]  [place] [address] [phone]              Future tests that were ordered for you today     Open Future Orders        Priority Expected Expires Ordered    Follow-Up with EP Cardiac Advanced Practice Provider- 1 Year Routine 4/9/2019 4/10/2019 4/9/2018            Who to contact     If you have questions or need follow up information about today's clinic visit or your schedule please contact [facility] PHYSICIANS HEART AT [facility] directly at [phone].  Normal or non-critical lab and imaging results will be communicated to you by MyChart, letter or phone within 4 business days after the clinic has received the results. If you do not hear from us within 7 days, please contact the clinic through [employer] or phone. If you have a critical or abnormal lab result, we will notify you by phone as soon as possible.  Submit refill requests through "[employer] or call your pharmacy and they will forward the refill request to us. Please allow 3 business days for your refill to be completed.          Additional Information About Your Visit        [employer] Information     "[employer] gives you secure access to your electronic health record. If you see a primary care provider, you can also send messages to your care team and make appointments. If you have questions, please call your primary care clinic.  If you do not have a primary care provider, please call [phone] and they will assist you.        Care EveryWhere ID     This is your Care EveryWhere ID. This could be used by other organizations to access your [facility] medical records  [id number]        Your Vitals Were     Pulse Pulse Oximetry BMI (Body Mass Index)             73 96% 28.07 kg/m2          Blood Pressure from Last 3 Encounters:   04/09/18 116/78    02/05/18 109/69   01/24/18 110/72    Weight from Last 3 Encounters:   04/09/18 93.9 kg (207 lb)   02/05/18 93 kg (205 lb)   01/24/18 93.9 kg (207 lb)                 Where to get your medicines      These medications were sent to [facility]/pharmacy #[id number] - [place], [place] - [address]  [address] [address]     Phone:  [phone]     folic acid 1 MG tablet    sotalol 80 MG tablet          Primary Care Provider Office Phone # Fax #    Anya Nowak [phone] [address] [address]        Equal Access to Services     JOSELINE MONGE : Hadii sabas escalante hadasho Somarti, waaxda luqadaha, qaybta kaalmada adeegyada, priya cunningham. So [facility] [phone].    ATENCIÓN: Si habla español, tiene a barger disposición servicios gratuitos de asistencia lingüística. LlGood [facility] [phone].    We comply with applicable federal civil rights laws and [place] laws. We do not discriminate on the basis of race, color, national origin, age, disability, sex, sexual orientation, or gender identity.            Thank you!     Thank you for choosing [facility] PHYSICIANS HEART AT [facility]  for your care. Our goal is always to provide you with excellent care. Hearing back from our patients is one way we can continue to improve our services. Please take a few minutes to complete the written survey that you may receive in the mail after your visit with us. Thank you!             Your Updated Medication List - Protect others around you: Learn how to safely use, store and throw away your medicines at www.disposemymeds.org.          This list is accurate as of 4/9/18  2:30 PM.  Always use your most recent med list.                   Brand Name Dispense Instructions for use Diagnosis    ARTIFICIAL TEAR SOLUTION OP      Apply  to eye. USE AS DIRECTED(DF)        CENTRUM PO      1 TABLET DAILY        folic acid 1 MG tablet    FOLVITE    90 tablet     Take 1 tablet (1,000 mcg) by mouth daily    Tkobk-typutw-oxla disease, chronic (H), Lymphoma, unspecified body region, unspecified lymphoma type (H)       NYQUIL PO      Take by mouth as needed        pantoprazole 20 MG EC tablet    PROTONIX    90 tablet    Take 1 tablet (20 mg) by mouth At Bedtime    Azrzv-cwcnpt-efvv disease, chronic (H), Hodgkin's disease of extranodal or solid organ site (H)       PREVIDENT 5000 DRY MOUTH 1.1 % Gel topical gel   Generic drug:  sodium fluoride dental gel      Take by mouth daily    Hodgkin's disease of extranodal or solid organ site (H), Status post bone marrow transplant (H), Hereditary hemochromatosis (H), Thyroid nodule       simvastatin 10 MG tablet    ZOCOR    90 tablet    TAKE 1 TABLET (10 MG) BY MOUTH AT BEDTIME    Hyperlipidemia with target LDL less than 100       sotalol 80 MG tablet    BETAPACE    90 tablet    Take 0.5 tablets (40 mg) by mouth 2 times daily Follow up visit needed with Dr Matt    Paroxysmal atrial fibrillation (H)       warfarin 5 MG tablet    COUMADIN    60 tablet    5 mg on Tue, Thu; 2.5 mg all other days or as directed by coumadin clinic    Atrial fibrillation, unspecified type (H), Antiphospholipid antibody syndrome (H)

## 2018-04-09 NOTE — LETTER
4/9/2018      RE: Haresh Rock  [address] [place] [address]       Dear Colleague,    Thank you for the opportunity to participate in the care of your patient, Haresh Rock, at the [facility] HEART AT [facility]. Please see a copy of my visit note below.       Clinical Cardiac Electrophysiology     Chief Complaint: Follow up post ablation, PAF, SVT     HPI: Haresh Rock  presents s/p reisloation PVI and right atrial focal AT ablation  on 1/4/2018  for PAF and AT. Mr. Rock is a 67 year old male with a past medical history significant for DM2, antiphospholipid antibody syndrome, AT, PAF (CHADSVASC 5 on warfarin) s/p PVI 3/2013, HTN, CKD II, DVT/PE, Hodgkin's disease, s/p BMT, and primary pulmonary HTN. He had an EP visit in October 2016 for episodes of palpitations and higher heart rates. A zio patch showed multiple episodes of AT up to 2 hours with  bpm. He was started on Metoprolol. He has also been on sotalol 40 mg BID. He followed up with Dr. Matt and decided to pursue ablation. On 1/4/2018, patient underwent a successful re-isolation of LPVs and ablation for RA focal AT. 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 Metoprolol was discontinued and sotalol was continued. Zio 3/9/2018 showed an episode sustained SVT.      States that he notes episodes of on his apple iPhone of heart rates up to 120s that last for a few minutes to half an hour. States that he is not bothered by episodes and usually doesn't feel any symptoms but once a day or less he notes palpitations or lightheadedness that for a few seconds to 30 seconds. Notes a little lightheadedness when he stands up for one second.  Patient states has resumed normal activity.  States that he  is feeling well. Denies chest pain or pressure, headaches, syncope, angina, dyspnea at rest or with exertion, dry cough, orthopnea, PND, abdominal pain, abdominal edema, pedal edema, or claudication.  Denies easy bruising or bleeding, hematuria, hematochezia, and epistaxis. Denies signs/symptoms of stroke such as visual disturbance, difficulty speaking, facial drooping, confusion, problems with gait, or any new numbness or weakness. Denies dysphagia, fever, chills, nausea, or vomiting. States that incision site is well healed without drainage, redness, warmth, or pain.     Current cardiac medications: Sotalol 40 mg twice daily, simvastatin 10mg daily, warfarin      Current Outpatient Prescriptions           Current Outpatient Prescriptions   Medication Sig Dispense Refill     simvastatin (ZOCOR) 10 MG tablet TAKE 1 TABLET (10 MG) BY MOUTH AT BEDTIME 90 tablet 3     sotalol (BETAPACE) 80 MG tablet Take 0.5 tablets (40 mg) by mouth 2 times daily Follow up visit needed with Dr Matt 90 tablet 1     PREVIDENT 5000 DRY MOUTH 1.1 % GEL topical gel Take by mouth daily   3     folic acid (FOLVITE) 1 MG tablet Take 1 tablet (1,000 mcg) by mouth daily 90 tablet 3     pantoprazole (PROTONIX) 20 MG EC tablet Take 1 tablet (20 mg) by mouth daily (Patient taking differently: Take 20 mg by mouth At Bedtime ) 90 tablet 3     warfarin (COUMADIN) 5 MG tablet 5 mg on Tue, Thu; 2.5 mg all other days or as directed by coumadin clinic 60 tablet 5     Pseudoeph-Doxylamine-DM-APAP (NYQUIL PO) Take by mouth as needed         ARTIFICIAL TEAR SOLUTION OP Apply  to eye. USE AS DIRECTED(DF)         CENTRUM OR 1 TABLET DAILY                 Past Medical History         Past Medical History:   Diagnosis Date     Antiphospholipid antibody syndrome (H) 2005     Ravi's     Atrial fibrillation (H) 4/2011     Cirrhosis (H)       CKD (chronic kidney disease) stage 1, GFR 90 ml/min or greater       CKD (chronic kidney disease) stage 2, GFR 60-89 ml/min        Diabetes mellitus type II       steroid induced     DJD (degenerative joint disease) of knee       SHAWN, worse on right     DVT (deep venous thrombosis) (H)       ED (erectile dysfunction)       Gallbladder polyp 5/2010     Wwidt-Dsgoqi-Aukn Disease 2007     BMT     Hemochromatosis       Hodgkin's disease NOS       5 cycles of ABVD in 2011     HTN (hypertension)       Hyperlipidaemia LDL goal <100       Myeloproliferative disorder (H)       atypical, [facility]     PE (pulmonary embolism) 11/2004     Polyneuropathy       Primary pulmonary hypertension (H)               Past Surgical History          Past Surgical History:   Procedure Laterality Date     ANESTHESIA CARDIOVERSION   4/21/2011     Procedure:ANESTHESIA CARDIOVERSION; Surgeon:GENERIC ANESTHESIA PROVIDER; Location:[facility] OR     ARTHROSCOPY KNEE RT/LT         LT     CATARACT IOL, RT/LT         COLONOSCOPY   10/16/2012     Procedure: COLONOSCOPY;  Colonoscopy, screening;  Surgeon: Reg Feliciano MD;  Location: [facility] OR     H ABLATION FOCAL AFIB   3/5/2013     PVI     H ABLATION FOCAL AFIB   01/01/2018     HC BONE MARROW/STEM TRANSPLANT ALLOGENIC   5/8/2007     INSERT PORT VASCULAR ACCESS         JOINT REPLACEMTN, KNEE RT/LT   3/28/12     SHAWN     VASECTOMY   1990             Family History           Family History   Problem Relation Age of Onset     Hypertension Mother       CEREBROVASCULAR DISEASE Mother       Arrhythmia Brother       Arrhythmia Sister       Alzheimer Disease Father       DIABETES Paternal Aunt       GASTROINTESTINAL DISEASE Maternal Grandmother         colitis      Thyroid Disease Sister       CANCER No family hx of       C.A.D. No family hx of       Thyroid Cancer No family hx of                   Social History   Substance Use Topics     Smoking status: Never Smoker     Smokeless tobacco: Never Used     Alcohol use No              Allergies   Allergen Reactions     No Known Drug Allergy              ROS:   Negative except for as indicated in  HPI.     Physical Examination:  Vitals: /69 (BP Location: Left arm, Patient Position: Chair, Cuff Size: Adult Regular)  Pulse 84  Wt 93 kg (205 lb)  SpO2 98%  BMI 27.8 kg/m2  BMI= Body mass index is 27.8 kg/(m^2).     GENERAL APPEARANCE: healthy, alert, and no acute distress  HEENT: no icterus, no xanthelasmas, normal pupil size and reaction, no cyanosis.  NECK: no asymmetry, thyroid normal to palpation, carotid upstrokes are normal bilaterally, no cervical bruits, no JVD   CHEST: lungs clear to auscultation - no rales, rhonchi or wheezes, no use of accessory muscles, no retractions, respirations are unlabored, normal respiratory rate  CARDIOVASCULAR: regular rhythm, Grade I murmur at upper RSB, no S3 or S4 and no murmur, click or rub, precordium quiet with normal PMI.  ABDOMEN: soft, non-tender, without hepatosplenomegaly, no masses palpable, bowel sounds normal, aorta not enlarged by palpation, no abdominal bruits  EXTREMITIES: no clubbing, cyanosis, or edema  NEURO: alert and oriented to person/place/time, normal speech, gait and affect  VASC: Radial, dorsalis pedis, and posterior tibialis pulses +2 and symmetric bilaterally.   SKIN: no ecchymoses, no rashes.      Laboratory:      Cholesterol (mg/dL)   Date Value   2018 122   2016 124   2015 126   2014 168          Cholesterol/HDL Ratio (no units)   Date Value   2015 4.1   2014 5.8 (H)   2013 5.0   2012 4.9          HDL Cholesterol (mg/dL)   Date Value   2018 31 (L)   2016 27 (L)   2015 31 (L)   2014 29 (L)          LDL Cholesterol Calculated (mg/dL)   Date Value   2018 69   2016 52   2015 71   2014 104      EK2018 NSR  ECHO:       Recent Results (from the past 8760 hour(s))   Echo complete with definity     Narrative     [id number]  ECH26  [id number]  [id number]^DEVENDRA^REYNA^JANAK              [facility]  [place]  [facility]-3rd Floor  [address] [place] [address]      Name: PAMELA GRANADOS  MRN: [id number]  : 1950  Study Date: 2017 08:30 AM  Age: 67 yrs  Gender: Male  Patient Location: [facility] [place]  Reason For Study: , Paroxysmal atrial fibrillation (H)  Ordering Physician: REYNA RAMSAY  Referring Physician: REYNA RAMSAY  Performed By: Donal Sy RDCS      BSA: 2.2 m2  Height: 72 in  Weight: 208 lb  HR: 60  BP: 127/71 mmHg  _____________________________________________________________________________  __          Procedure  Echocardiogram with two-dimensional, color and spectral Doppler performed.  Contrast Definity. Definity (NDC #98603-184-02) given intravenously. Patient  was given 5ml mixture of 1.5ml Definity and 8.5ml saline. 5 ml wasted. IV  start location RAC .  _____________________________________________________________________________  __          Interpretation Summary  Normal left ventricular size, thickness, and systolic function. EF 55-60%.  Mild right ventricular dilation with normal function.  Moderate to severe calcification of the aortic valve with mild stenosis and  mild insufficiency. Low flow-low gradient AS should be considered, aortic  valve area is 1.6 cm2 and stroke volume is mildly reduced.  No pericardial effusion.      Compared to the prior study 3/4/2013, the aortic valve appears more calcified.  _____________________________________________________________________________  __          Left Ventricle  Left ventricular size is normal. Left ventricular wall thickness is normal.  Global and regional left ventricular function is normal with an EF of 55-60%.  Normal left ventricular filling for age.      Right Ventricle  Global right ventricular function is normal. Mild right ventricular dilation  is present.      Atria  Both atria appear normal.      Mitral Valve  Mild mitral annular calcification is present. Trace to mild  mitral  insufficiency is present.          Aortic Valve  Moderate to severe aortic valve sclerosis is present. Mild aortic  insufficiency is present. The calculated aortic valve are is 1.6 cm^2.      Tricuspid Valve  Mild tricuspid insufficiency is present. Right ventricular systolic pressure  is 27mmHg above the right atrial pressure.      Pulmonic Valve  The pulmonic valve is normal.      Vessels  The aorta root is normal. The inferior vena cava cannot be assessed.      Pericardium  No pericardial effusion is present.          Attestation  I have personally viewed the imaging and agree with the interpretation and  report as documented by the fellow, Jens Corley, and/or edited by me.  _____________________________________________________________________________  __  MMode/2D Measurements & Calculations  IVSd: 0.92 cm      LVIDd: 4.9 cm  LVIDs: 2.9 cm  LVPWd: 0.71 cm  FS: 40.1 %  EDV(Teich): 111.0 ml  ESV(Teich): 32.7 ml  LV mass(C)d: 133.4 grams  LV mass(C)dI: 61.6 grams/m2  Ao root diam: 3.2 cm  LA dimension: 3.7 cm  asc Aorta Diam: 3.0 cm  LA/Ao: 1.2  LVOT diam: 2.2 cm  LVOT area: 3.8 cm2  LA Volume (BP): 67.0 ml  LA Volume Index (BP): 30.9 ml/m2  RWT: 0.29              Doppler Measurements & Calculations  MV E max renato: 52.6 cm/sec  MV A max renato: 48.0 cm/sec  MV E/A: 1.1  MV dec time: 0.14 sec  Ao V2 max: 182.3 cm/sec  Ao max P.0 mmHg  Ao V2 mean: 127.0 cm/sec  Ao mean P.3 mmHg  Ao V2 VTI: 43.6 cm  ELIANE(I,D): 1.6 cm2  ELIANE(V,D): 1.5 cm2  LV V1 max P.2 mmHg  LV V1 max: 74.1 cm/sec  LV V1 VTI: 18.3 cm  CO(LVOT): 4.1 l/min  CI(LVOT): 1.9 l/min/m2  SV(LVOT): 69.5 ml  SI(LVOT): 32.1 ml/m2  PA V2 max: 104.5 cm/sec  PA max P.4 mmHg  PA acc time: 0.07 sec  TR max renato: 258.8 cm/sec  TR max P.8 mmHg  Pulm Sys Renato: 33.1 cm/sec  Pulm Lund Renato: 22.7 cm/sec  Pulm S/D: 1.5  ELIANE Index (cm2/m2): 0.74  E/E' av.7  Lateral E/e': 6.3  Medial E/e':  7.1          _____________________________________________________________________________  __          Report approved by: Del Martinez 11/21/2017 10:45 AM          CTA angio: 1/3/2018 IMPRESSION: Normal pulmonary venous anatomy with all pulmonary veins draining into the left atrium     Assessment and recommendations:     #1. ATRIAL FIBRILLATION: Patient is happy with how he feels and does not wish changes to his treatment plan. Discussed with patient that if he notes increasing or worsening symptoms he should seek medical attention.   1. Stroke Prophylaxis:  CHADSVASC=age+, HTN+, DM+, DVT++ = 5  5, corresponding to a 6.7% annual stroke / systemic emolism event rate. indicating need for long term oral anticoagulation.  INRs have been [place], no problems with bleeding, continue warfarin.  2. Rate Control: Rhythm control method utilized.   3. Rhythm Control: Continue sotalol 40 mg twice daily. A repeat ablation can be considered for any symptomatic recurrence.    Follow up: Follow up in one year or sooner as needed for symptoms or problems.      Patient expresses understanding and agreement with the plan.     I appreciate the chance to help with Haresh snell. Please let me know if you have any questions or concerns.     Farrah POND, CARLITO-C

## 2018-04-12 ENCOUNTER — ANTICOAGULATION THERAPY VISIT (OUTPATIENT)
Dept: NURSING | Facility: CLINIC | Age: 68
End: 2018-04-12
Payer: COMMERCIAL

## 2018-04-12 ENCOUNTER — TELEPHONE (OUTPATIENT)
Dept: NURSING | Facility: CLINIC | Age: 68
End: 2018-04-12

## 2018-04-12 DIAGNOSIS — D68.61 ANTIPHOSPHOLIPID ANTIBODY SYNDROME (H): ICD-10-CM

## 2018-04-12 DIAGNOSIS — I48.0 PAROXYSMAL ATRIAL FIBRILLATION (H): Primary | ICD-10-CM

## 2018-04-12 DIAGNOSIS — I48.0 PAROXYSMAL ATRIAL FIBRILLATION (H): ICD-10-CM

## 2018-04-12 DIAGNOSIS — Z79.01 LONG-TERM (CURRENT) USE OF ANTICOAGULANTS: ICD-10-CM

## 2018-04-12 DIAGNOSIS — Z86.718 PERSONAL HISTORY OF DVT (DEEP VEIN THROMBOSIS): ICD-10-CM

## 2018-04-12 LAB — INR POINT OF CARE: 2.7 (ref 0.86–1.14)

## 2018-04-12 PROCEDURE — 85610 PROTHROMBIN TIME: CPT | Mod: QW

## 2018-04-12 PROCEDURE — 99207 ZZC NO CHARGE NURSE ONLY: CPT

## 2018-04-12 PROCEDURE — 36416 COLLJ CAPILLARY BLOOD SPEC: CPT

## 2018-04-12 NOTE — TELEPHONE ENCOUNTER
Has the patient previously taken warfarin? yes  If yes, for what indication? Atrial Fibrillation    Does the patient have any of the following indications for a higher range of 2.5-3.5:    Mitral position mechanical valve? no    Gerber-Shiley, Ball and Cage or Monoleaflet valve (regardless of position) no    Other (if yes, please explain) no    Annual INR referral needed.  Orders teed up.    Laverne Ferguson RN - LAURA [facility]

## 2018-04-12 NOTE — PROGRESS NOTES
ANTICOAGULATION FOLLOW-UP CLINIC VISIT    Patient Name:  Haresh Rock  Date:  4/12/2018  Contact Type:  Face to Face    SUBJECTIVE:     Patient Findings     Positives No Problem Findings           OBJECTIVE    INR Protime   Date Value Ref Range Status   04/12/2018 2.7 (A) 0.86 - 1.14 Final     Factor 2 Assay   Date Value Ref Range Status   05/01/2007 58 (L) 60 - 140 % Final     Comment:     (Note)  CALLED TO TAMARA(INTERV. RADIOLOGY)[id number],        ASSESSMENT / PLAN  INR assessment THER    Recheck INR In: 6 WEEKS    INR Location Clinic      Anticoagulation Summary as of 4/12/2018     INR goal 2.0-3.0   Today's INR 2.7   Maintenance plan 5 mg (5 mg x 1) on Tue, Thu; 2.5 mg (5 mg x 0.5) all other days   Full instructions 5 mg on Tue, Thu; 2.5 mg all other days   Weekly total 22.5 mg   No change documented Laverne Ferguson RN   Plan last modified Belinda Francis RN (4/19/2016)   Next INR check 5/24/2018   Priority INR   Target end date Indefinite    Indications   Long-term (current) use of anticoagulants [Z79.01] [Z79.01]  Atrial fibrillation (H) [I48.91]         Anticoagulation Episode Summary     INR check location     Preferred lab     Send INR reminders to [facility]    Comments       Anticoagulation Care Providers     Provider Role Specialty Phone number    Anya Nowak MD [facility] [phone]            See the Encounter Report to view Anticoagulation Flowsheet and Dosing Calendar (Go to Encounters tab in chart review, and find the Anticoagulation Therapy Visit)    Dosage adjustment made based on physician directed care plan.    Laverne Ferguson, KOSTAS

## 2018-04-12 NOTE — MR AVS SNAPSHOT
Haresh Rock   4/12/2018 10:15 AM   Anticoagulation Therapy Visit    Description:  67 year old male   Provider:  [facility]AG   Department:  [facility] Nurse           INR as of 4/12/2018     Today's INR 2.7      Anticoagulation Summary as of 4/12/2018     INR goal 2.0-3.0   Today's INR 2.7   Full instructions 5 mg on Tue, Thu; 2.5 mg all other days   Next INR check 5/24/2018    Indications   Long-term (current) use of anticoagulants [Z79.01] [Z79.01]  Atrial fibrillation (H) [I48.91]         Your next Anticoagulation Clinic appointment(s)     May 24, 2018 10:15 AM CDT   Anticoagulation Visit with [facility]   [facility] ([address] [address] [phone]              Contact Numbers     [facility]  Please call [phone] to cancel and/or reschedule your appointment   Please call [phone] with any problems or questions regarding your therapy.        April 2018 Details    Sun Mon Tue Wed Thu Fri Sat     1               2               3               4               5               6               7                 8               9               10               11               12      5 mg   See details      13      2.5 mg         14      2.5 mg           15      2.5 mg         16      2.5 mg         17      5 mg         18      2.5 mg         19      5 mg         20      2.5 mg         21      2.5 mg           22      2.5 mg         23      2.5 mg         24      5 mg         25      2.5 mg         26      5 mg         27      2.5 mg         28      2.5 mg           29      2.5 mg         30      2.5 mg               Date Details   04/12 This INR check               How to take your warfarin dose     To take:  2.5 mg Take 0.5 of a 5 mg tablet.    To take:  5 mg Take 1 of the 5 mg tablets.           May 2018 Details    Sun Mon Tue Wed Thu Fri Sat       1      5 mg         2      2.5 mg         3      5 mg         4      2.5 mg          5      2.5 mg           6      2.5 mg         7      2.5 mg         8      5 mg         9      2.5 mg         10      5 mg         11      2.5 mg         12      2.5 mg           13      2.5 mg         14      2.5 mg         15      5 mg         16      2.5 mg         17      5 mg         18      2.5 mg         19      2.5 mg           20      2.5 mg         21      2.5 mg         22      5 mg         23      2.5 mg         24            25               26                 27               28               29               30               31                  Date Details   No additional details    Date of next INR:  5/24/2018         How to take your warfarin dose     To take:  2.5 mg Take 0.5 of a 5 mg tablet.    To take:  5 mg Take 1 of the 5 mg tablets.

## 2018-05-22 ENCOUNTER — APPOINTMENT (OUTPATIENT)
Dept: LAB | Facility: CLINIC | Age: 68
End: 2018-05-22
Attending: INTERNAL MEDICINE
Payer: MEDICARE

## 2018-05-22 ENCOUNTER — ONCOLOGY VISIT (OUTPATIENT)
Dept: TRANSPLANT | Facility: CLINIC | Age: 68
End: 2018-05-22
Attending: INTERNAL MEDICINE
Payer: MEDICARE

## 2018-05-22 VITALS
TEMPERATURE: 98.2 F | WEIGHT: 209.4 LBS | OXYGEN SATURATION: 95 % | HEART RATE: 74 BPM | DIASTOLIC BLOOD PRESSURE: 81 MMHG | BODY MASS INDEX: 28.4 KG/M2 | SYSTOLIC BLOOD PRESSURE: 131 MMHG | RESPIRATION RATE: 16 BRPM

## 2018-05-22 DIAGNOSIS — Z79.01 LONG-TERM (CURRENT) USE OF ANTICOAGULANTS: Primary | ICD-10-CM

## 2018-05-22 DIAGNOSIS — E04.1 THYROID NODULE: ICD-10-CM

## 2018-05-22 DIAGNOSIS — C81.99 HODGKIN'S DISEASE OF EXTRANODAL OR SOLID ORGAN SITE (H): ICD-10-CM

## 2018-05-22 DIAGNOSIS — I48.0 PAROXYSMAL ATRIAL FIBRILLATION (H): ICD-10-CM

## 2018-05-22 DIAGNOSIS — E83.110 HEREDITARY HEMOCHROMATOSIS (H): ICD-10-CM

## 2018-05-22 LAB
ALBUMIN SERPL-MCNC: 3.6 G/DL (ref 3.4–5)
ALP SERPL-CCNC: 112 U/L (ref 40–150)
ALT SERPL W P-5'-P-CCNC: 24 U/L (ref 0–70)
ANION GAP SERPL CALCULATED.3IONS-SCNC: 9 MMOL/L (ref 3–14)
AST SERPL W P-5'-P-CCNC: 35 U/L (ref 0–45)
BASOPHILS # BLD AUTO: 0 10E9/L (ref 0–0.2)
BASOPHILS NFR BLD AUTO: 0.5 %
BILIRUB SERPL-MCNC: 1.1 MG/DL (ref 0.2–1.3)
BUN SERPL-MCNC: 22 MG/DL (ref 7–30)
CALCIUM SERPL-MCNC: 8.5 MG/DL (ref 8.5–10.1)
CHLORIDE SERPL-SCNC: 101 MMOL/L (ref 94–109)
CO2 SERPL-SCNC: 24 MMOL/L (ref 20–32)
CREAT SERPL-MCNC: 1.07 MG/DL (ref 0.66–1.25)
DIFFERENTIAL METHOD BLD: ABNORMAL
EOSINOPHIL # BLD AUTO: 0.1 10E9/L (ref 0–0.7)
EOSINOPHIL NFR BLD AUTO: 1.2 %
ERYTHROCYTE [DISTWIDTH] IN BLOOD BY AUTOMATED COUNT: 12 % (ref 10–15)
FERRITIN SERPL-MCNC: 111 NG/ML (ref 26–388)
GFR SERPL CREATININE-BSD FRML MDRD: 68 ML/MIN/1.7M2
GLUCOSE SERPL-MCNC: 260 MG/DL (ref 70–99)
HCT VFR BLD AUTO: 47.9 % (ref 40–53)
HGB BLD-MCNC: 17.3 G/DL (ref 13.3–17.7)
IMM GRANULOCYTES # BLD: 0 10E9/L (ref 0–0.4)
IMM GRANULOCYTES NFR BLD: 0.2 %
INR PPP: 3.36 (ref 0.86–1.14)
IRON SATN MFR SERPL: 87 % (ref 15–46)
IRON SERPL-MCNC: 222 UG/DL (ref 35–180)
LYMPHOCYTES # BLD AUTO: 2.3 10E9/L (ref 0.8–5.3)
LYMPHOCYTES NFR BLD AUTO: 28.5 %
MCH RBC QN AUTO: 33.6 PG (ref 26.5–33)
MCHC RBC AUTO-ENTMCNC: 36.1 G/DL (ref 31.5–36.5)
MCV RBC AUTO: 93 FL (ref 78–100)
MONOCYTES # BLD AUTO: 0.8 10E9/L (ref 0–1.3)
MONOCYTES NFR BLD AUTO: 9.9 %
NEUTROPHILS # BLD AUTO: 4.8 10E9/L (ref 1.6–8.3)
NEUTROPHILS NFR BLD AUTO: 59.7 %
NRBC # BLD AUTO: 0 10*3/UL
NRBC BLD AUTO-RTO: 0 /100
PLATELET # BLD AUTO: 113 10E9/L (ref 150–450)
POTASSIUM SERPL-SCNC: 4.3 MMOL/L (ref 3.4–5.3)
PROT SERPL-MCNC: 7.2 G/DL (ref 6.8–8.8)
RBC # BLD AUTO: 5.15 10E12/L (ref 4.4–5.9)
SODIUM SERPL-SCNC: 134 MMOL/L (ref 133–144)
TIBC SERPL-MCNC: 254 UG/DL (ref 240–430)
TSH SERPL DL<=0.005 MIU/L-ACNC: 2.38 MU/L (ref 0.4–4)
WBC # BLD AUTO: 8.1 10E9/L (ref 4–11)

## 2018-05-22 PROCEDURE — G0463 HOSPITAL OUTPT CLINIC VISIT: HCPCS | Mod: ZF

## 2018-05-22 PROCEDURE — 82728 ASSAY OF FERRITIN: CPT | Performed by: INTERNAL MEDICINE

## 2018-05-22 PROCEDURE — 85610 PROTHROMBIN TIME: CPT | Performed by: INTERNAL MEDICINE

## 2018-05-22 PROCEDURE — 36415 COLL VENOUS BLD VENIPUNCTURE: CPT

## 2018-05-22 PROCEDURE — 80053 COMPREHEN METABOLIC PANEL: CPT | Performed by: INTERNAL MEDICINE

## 2018-05-22 PROCEDURE — 83540 ASSAY OF IRON: CPT | Performed by: INTERNAL MEDICINE

## 2018-05-22 PROCEDURE — 84443 ASSAY THYROID STIM HORMONE: CPT | Performed by: INTERNAL MEDICINE

## 2018-05-22 PROCEDURE — 83550 IRON BINDING TEST: CPT | Performed by: INTERNAL MEDICINE

## 2018-05-22 PROCEDURE — 85025 COMPLETE CBC W/AUTO DIFF WBC: CPT | Performed by: INTERNAL MEDICINE

## 2018-05-22 ASSESSMENT — PAIN SCALES - GENERAL: PAINLEVEL: NO PAIN (0)

## 2018-05-22 NOTE — NURSING NOTE
Chief Complaint   Patient presents with     Blood Draw     Labs drawn from vpt by RN. Vs taken and pt checked in for appt     Labs collected from venipuncture by RN. Vitals taken. Checked in for appointment(s).    Nieves Montanez RN

## 2018-05-22 NOTE — MR AVS SNAPSHOT
After Visit Summary   5/22/2018    Haresh Rock    MRN: [id number]           Patient Information     Date Of Birth          1950        Visit Information        Provider Department      5/22/2018 10:30 AM Augusto Miller MD [facility] Blood and Marrow Transplant        Today's Diagnoses     Long-term (current) use of anticoagulants [Z79.01]    -  1    Hodgkin's disease of extranodal or solid organ site (H)        Hereditary hemochromatosis (H)        Paroxysmal atrial fibrillation (H)        Thyroid nodule              [facility] ([facility])  [address] [place] [address]  Phone: [phone]  Clinic Hours:   Monday-Thursday:7am to 7pm   Friday: 7am to 5pm   Weekends and holidays:    8am to noon (in general)  If your fever is 100.5  or greater,   call the clinic.  After hours call the   hospital at [phone] or   [phone]. Ask for the BMT   fellow on-call            Follow-ups after your visit        Follow-up notes from your care team     Return in about 6 months (around 11/22/2018).      Your next 10 appointments already scheduled     May 24, 2018 10:15 AM CDT   Anticoagulation Visit with NICOLAS ANTI COAG   [facility] ([facility])    [address] [place] [id number]   [phone]            Nov 20, 2018 10:00 AM CST   [facility] Draw with  [facility] LAB DRAW   [facility] ([facility])    [address] [place] [id number] [phone]            Nov 20, 2018 10:30 AM CST   Return with Augusto Miller MD   [facility] Blood and Marrow Transplant ([facility])    [address] [place] [id number] [phone]              Future tests that were ordered for you today     Open Future Orders        Priority Expected Expires Ordered    TSH Routine 12/22/2018 1/22/2019 5/22/2018    INR Routine 12/22/2018  1/22/2019 5/22/2018    Reticulocyte count Routine 11/22/2018 12/22/2018 5/22/2018    Comprehensive metabolic panel Routine 11/22/2018 12/22/2018 5/22/2018    CRP inflammation Routine 11/22/2018 12/22/2018 5/22/2018    Ferritin Routine 11/22/2018 12/22/2018 5/22/2018    Iron and iron binding capacity Routine 11/22/2018 12/22/2018 5/22/2018    Lactate Dehydrogenase Routine 11/22/2018 12/22/2018 5/22/2018    Protein electrophoresis Routine 11/22/2018 12/22/2018 5/22/2018    CBC with platelets differential Routine 11/22/2018 12/22/2018 5/22/2018            Who to contact     If you have questions or need follow up information about today's clinic visit or your schedule please contact [facility] B[facility]OD [facility] MARROW TRANSPLANT directly at [phone].  Normal or non-critical lab and imaging results will be communicated to you by Manyetahart, letter or phone within 4 business days after the clinic has received the results. If you do not hear from us within 7 days, please contact the clinic through [employer] or phone. If you have a critical or abnormal lab result, we will notify you by phone as soon as possible.  Submit refill requests through [employer] or call your pharmacy and they will forward the refill request to us. Please allow 3 business days for your refill to be completed.          Additional Information About Your Visit        Manyetahar[employer] Information     [employer] gives you secure access to your electronic health record. If you see a primary care provider, you can also send messages to your care team and make appointments. If you have questions, please call your primary care clinic.  If you do not have a primary care provider, please call [phone] and they will assist you.        Care EveryWhere ID     This is your Care EveryWhere ID. This could be used by other organizations to access your [facility] medical records  [id number]        Your Vitals Were     Pulse Temperature Respirations Pulse Oximetry BMI (Body Mass Index)        74 98.2  F (36.8  C) (Oral) 16 95% 28.4 kg/m2        Blood Pressure from Last 3 Encounters:   05/22/18 131/81   04/09/18 116/78   02/05/18 109/69    Weight from Last 3 Encounters:   05/22/18 95 kg (209 lb 6.4 oz)   04/09/18 93.9 kg (207 lb)   02/05/18 93 kg (205 lb)              We Performed the Following     CBC with platelets differential     Comprehensive metabolic panel     Ferritin     INR     Iron and iron binding capacity     TSH        Recent Review Flowsheet Data     BMT Recent Results Latest Ref Rng & Units 1/5/2018 1/5/2018 1/5/2018 1/11/2018 2/21/2018 4/12/2018 5/22/2018    WBC 4.0 - 11.0 10e9/L - - - - - - 8.1    Hemoglobin 13.3 - 17.7 g/dL - - - - - - 17.3    Platelet Count 150 - 450 10e9/L - - - - - - 113(L)    Neutrophils (Absolute) 1.6 - 8.3 10e9/L - - - - - - 4.8    INR 0.86 - 1.14 - - 3.90(H) 2.3(A) 2.3(A) 2.7(A) 3.36(H)    Sodium 133 - 144 mmol/L - - - - - - -    Potassium 3.4 - 5.3 mmol/L - - - - - - -    Chloride 94 - 109 mmol/L - - - - - - -    Glucose 70 - 99 mg/dL 215(H) 118(H) - - - - -    Urea Nitrogen 7 - 30 mg/dL - - - - - - -    Creatinine 0.66 - 1.25 mg/dL - - - - - - -    Calcium (Total) 8.5 - 10.1 mg/dL - - - - - - -    Protein (Total) 6.8 - 8.8 g/dL - - - - - - -    Albumin 3.4 - 5.0 g/dL - - - - - - -    Alkaline Phosphatase 40 - 150 U/L - - - - - - -    AST 0 - 45 U/L - - - - - - -    ALT 0 - 70 U/L - - - - - - -    MCV 78 - 100 fl - - - - - - 93               Primary Care Provider Office Phone # Fax #    Anya Nowak [phone] [address] [facility]  [place] [address]        Equal Access to Services     JOSELINE MONGE : Luisa villalta Somarti, waaxda luqadaha, qaybta kaalmada adeegyada, priya cunningham. So [facility] [phone].    ATENCIÓN: Si habla español, tiene a barger disposición servicios gratuitos de asistencia lingüística. Llame al [phone].    We comply with applicable federal civil rights laws and [place] laws.  We do not discriminate on the basis of race, color, national origin, age, disability, sex, sexual orientation, or gender identity.            Thank you!     Thank you for choosing [facility] BLOOD AND MARROW TRANSPLANT  for your care. Our goal is always to provide you with excellent care. Hearing back from our patients is one way we can continue to improve our services. Please take a few minutes to complete the written survey that you may receive in the mail after your visit with us. Thank you!             Your Updated Medication List - Protect others around you: Learn how to safely use, store and throw away your medicines at www.disposemymeds.org.          This list is accurate as of 5/22/18 11:21 AM.  Always use your most recent med list.                   Brand Name Dispense Instructions for use Diagnosis    ARTIFICIAL TEAR SOLUTION OP      Apply  to eye. USE AS DIRECTED(DF)        CENTRUM PO      1 TABLET DAILY        folic acid 1 MG tablet    FOLVITE    90 tablet    Take 1 tablet (1,000 mcg) by mouth daily    Xqutp-hxpamv-dpti disease, chronic (H), Lymphoma, unspecified body region, unspecified lymphoma type (H)       NYQUIL PO      Take by mouth as needed        pantoprazole 20 MG EC tablet    PROTONIX    90 tablet    Take 1 tablet (20 mg) by mouth At Bedtime    Njuns-uegcyb-rksn disease, chronic (H), Hodgkin's disease of extranodal or solid organ site (H)       PREVIDENT 5000 DRY MOUTH 1.1 % Gel topical gel   Generic drug:  sodium fluoride dental gel      Take by mouth daily    Hodgkin's disease of extranodal or solid organ site (H), Status post bone marrow transplant (H), Hereditary hemochromatosis (H), Thyroid nodule       simvastatin 10 MG tablet    ZOCOR    90 tablet    TAKE 1 TABLET (10 MG) BY MOUTH AT BEDTIME    Hyperlipidemia with target LDL less than 100       sotalol 80 MG tablet    BETAPACE    90 tablet    Take 0.5 tablets (40 mg) by mouth 2 times daily Follow up visit needed with Dr Matt     Paroxysmal atrial fibrillation (H)       warfarin 5 MG tablet    COUMADIN    60 tablet    5 mg on Tue, Thu; 2.5 mg all other days or as directed by coumadin clinic    Atrial fibrillation, unspecified type (H), Antiphospholipid antibody syndrome (H)

## 2018-05-22 NOTE — NURSING NOTE
Oncology Rooming Note    May 22, 2018 10:56 AM   Haresh Rock is a 68 year old male who presents for:    Chief Complaint   Patient presents with     Blood Draw     Labs drawn from vpt by RN. Vs taken and pt checked in for appt     Oncology Clinic Visit     Patient with Hodgkins Disease here for provider visit and lab review      Initial Vitals: /81 (BP Location: Right arm, Cuff Size: Adult Regular)  Pulse 74  Temp 98.2  F (36.8  C) (Oral)  Resp 16  Wt 95 kg (209 lb 6.4 oz)  SpO2 95%  BMI 28.4 kg/m2 Estimated body mass index is 28.4 kg/(m^2) as calculated from the following:    Height as of 1/24/18: 1.829 m (6').    Weight as of this encounter: 95 kg (209 lb 6.4 oz). Body surface area is 2.2 meters squared.  No Pain (0) Comment: Data Unavailable   No LMP for male patient.  Allergies reviewed: Yes  Medications reviewed: Yes    Medications: Medication refills not needed today.  Pharmacy name entered into [employer]: [id number] - [place], [place] - [address]    Clinical concerns:   5 minutes for nursing intake (face to face time)     Zahra Bonner CMA

## 2018-05-22 NOTE — PROGRESS NOTES
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 Haresh had another cardiac ablation in Jan 2018.He is not sure it helped much.Had some tachycardia with rates up to 127 for 5 h. No fever or chills. Has not had a phlebotomy for over a year or two    PAST MEDICAL HISTORY:  See my note from 11/2017    SOCIAL HISTORY:  See my note from 11/2017     FAMILY HISTORY:  See my note from  11/2017     REVIEW OF SYSTEMS:  A 12-point review of systems is done and is negative, except as in the HPI.      PHYSICAL EXAMINATION:   GENERAL:  He is an alert gentleman in no acute distress.   VITAL SIGNS:  Stable. /81 (BP Location: Right arm, Cuff Size: Adult Regular)  Pulse 74  Temp 98.2  F (36.8  C) (Oral)  Resp 16  Wt 95 kg (209 lb 6.4 oz)  SpO2 95%  BMI 28.4 kg/m2    HEENT:  Normocephalic.  EOM okay, no scleral icterus.  Mouth without lesion.   RESPIRATORY:  Clear to percussion and auscultation.   Neck:  There is some asymmetry to his thyroid.  I feel the right lobe a little more prominently than the left.   LYMPH:  I cannot feel any cervical, supraclavicular, submandibular, axillary or inguinal nodes.   CARDIAC: Today he is in Normal sinus rhythm.  No S3, S4  I hear  1 -2/6 SAYDA at RSB at  2ndRICS.   ABDOMEN:  Soft without hepatosplenomegaly.   EXTREMITIES:  Without edema.   Results for HARESH GRANADOS (MRN [id number]) as of 5/22/2018 17:13   Ref. Range 5/22/2018 10:31   Sodium Latest Ref Range: 133 - 144 mmol/L 134   Potassium Latest Ref Range: 3.4 - 5.3 mmol/L 4.3   Chloride Latest Ref Range: 94 - 109 mmol/L 101   Carbon  Dioxide Latest Ref Range: 20 - 32 mmol/L 24   Urea Nitrogen Latest Ref Range: 7 - 30 mg/dL 22   Creatinine Latest Ref Range: 0.66 - 1.25 mg/dL 1.07   GFR Estimate Latest Ref Range: >60 mL/min/1.7m2 68   GFR Estimate If Black Latest Ref Range: >60 mL/min/1.7m2 83   Calcium Latest Ref Range: 8.5 - 10.1 mg/dL 8.5   Anion Gap Latest Ref Range: 3 - 14 mmol/L 9   Albumin Latest Ref Range: 3.4 - 5.0 g/dL 3.6   Protein Total Latest Ref Range: 6.8 - 8.8 g/dL 7.2   Bilirubin Total Latest Ref Range: 0.2 - 1.3 mg/dL 1.1   Alkaline Phosphatase Latest Ref Range: 40 - 150 U/L 112   ALT Latest Ref Range: 0 - 70 U/L 24   AST Latest Ref Range: 0 - 45 U/L 35   Ferritin Latest Ref Range: 26 - 388 ng/mL 111   Iron Latest Ref Range: 35 - 180 ug/dL 222 (H)   Iron Binding Cap Latest Ref Range: 240 - 430 ug/dL 254   Iron Saturation Index Latest Ref Range: 15 - 46 % 87 (H)   TSH Latest Ref Range: 0.40 - 4.00 mU/L 2.38   Glucose Latest Ref Range: 70 - 99 mg/dL 260 (H)   WBC Latest Ref Range: 4.0 - 11.0 10e9/L 8.1   Hemoglobin Latest Ref Range: 13.3 - 17.7 g/dL 17.3   Hematocrit Latest Ref Range: 40.0 - 53.0 % 47.9   Platelet Count Latest Ref Range: 150 - 450 10e9/L 113 (L)   RBC Count Latest Ref Range: 4.4 - 5.9 10e12/L 5.15   MCV Latest Ref Range: 78 - 100 fl 93   MCH Latest Ref Range: 26.5 - 33.0 pg 33.6 (H)   MCHC Latest Ref Range: 31.5 - 36.5 g/dL 36.1   RDW Latest Ref Range: 10.0 - 15.0 % 12.0   Diff Method Unknown Automated Method   % Neutrophils Latest Units: % 59.7   % Lymphocytes Latest Units: % 28.5   % Monocytes Latest Units: % 9.9   % Eosinophils Latest Units: % 1.2   % Basophils Latest Units: % 0.5   % Immature Granulocytes Latest Units: % 0.2   Nucleated RBCs Latest Ref Range: 0 /100 0   Absolute Neutrophil Latest Ref Range: 1.6 - 8.3 10e9/L 4.8   Absolute Lymphocytes Latest Ref Range: 0.8 - 5.3 10e9/L 2.3   Absolute Monocytes Latest Ref Range: 0.0 - 1.3 10e9/L 0.8   Absolute Eosinophils Latest Ref Range: 0.0 - 0.7 10e9/L 0.1    Absolute Basophils Latest Ref Range: 0.0 - 0.2 10e9/L 0.0   Abs Immature Granulocytes Latest Ref Range: 0 - 0.4 10e9/L 0.0   Absolute Nucleated RBC Unknown 0.0   INR Latest Ref Range: 0.86 - 1.14  3.36 (H)           ASSESSMENT:     1.  Myeloproliferative syndrome/MDS.   2.  Status post non-myeloablative allo-sib peripheral blood stem cell transplant.   3.  History of chronic dwhsy-rdejnn-jnuw disease.   4.  History of Hodgkin's disease.   5.  History of cardiac arrhythmias, SVT and V tach.   6.  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   7.  History of cirrhosis.   8.  History of pulmonary emboli.   9.  History of elevated hemoglobin A1c.   10.  Thyroid nodule.    11.  Cholelithiasis.     12.  Diverticulosis.   13.  Anticoagulation.   14.  Status post bilateral knee replacement.     15.  Aortic stenosis      Haresh is doing well. Hemoglobin is a bit high today.May be a bit dehydrated, advised to drink more fluids.Need to check iron levels  No evidence of  Hodgkins remains in CR, post chem 7 years ago.Continues to have gallstones He does have  Some arrhythmias on sotalol and aortic stenosis. Needs to f/u with Dr Matt in Cardiology.Continue warfarin for DVT hx.INR a bit long will need coumadin clinic to adjust I  will see him again in 6 months' time.

## 2018-05-24 ENCOUNTER — ANTICOAGULATION THERAPY VISIT (OUTPATIENT)
Dept: NURSING | Facility: CLINIC | Age: 68
End: 2018-05-24
Payer: COMMERCIAL

## 2018-05-24 DIAGNOSIS — I48.0 PAROXYSMAL ATRIAL FIBRILLATION (H): ICD-10-CM

## 2018-05-24 DIAGNOSIS — Z79.01 LONG-TERM (CURRENT) USE OF ANTICOAGULANTS: ICD-10-CM

## 2018-05-24 LAB — INR POINT OF CARE: 3.9 (ref 0.86–1.14)

## 2018-05-24 PROCEDURE — 99207 ZZC NO CHARGE NURSE ONLY: CPT

## 2018-05-24 PROCEDURE — 36416 COLLJ CAPILLARY BLOOD SPEC: CPT

## 2018-05-24 PROCEDURE — 85610 PROTHROMBIN TIME: CPT | Mod: QW

## 2018-05-24 NOTE — MR AVS SNAPSHOT
Haresh Rock   5/24/2018 10:15 AM   Anticoagulation Therapy Visit    Description:  68 year old male   Provider:  TY ANTI COAG   Department:  [facility]           INR as of 5/24/2018     Today's INR 3.9!      Anticoagulation Summary as of 5/24/2018     INR goal 2.0-3.0   Today's INR 3.9!   Full warfarin instructions 5/25: Hold; 5/26: Hold; Otherwise 5 mg on Tue, Thu; 2.5 mg all other days   Next INR check 6/8/2018    Indications   Long-term (current) use of anticoagulants [Z79.01] [Z79.01]  Atrial fibrillation (H) [I48.91]         Your next Anticoagulation Clinic appointment(s)     Jun 08, 2018 10:15 AM CDT   Anticoagulation Visit with TY ANTI LEONIDES   [facility] ([address] [phone]              Contact Numbers     [facility]  Please call [phone] to cancel and/or reschedule your appointment   Please call [phone] with any problems or questions regarding your therapy.        May 2018 Details    Sun Mon Tue Wed Thu Fri Sat       1               2               3               4               5                 6               7               8               9               10               11               12                 13               14               15               16               17               18               19                 20               21               22               23               24      5 mg   See details      25      Hold         26      Hold           27      2.5 mg         28      2.5 mg         29      5 mg         30      2.5 mg         31      5 mg            Date Details   05/24 This INR check               How to take your warfarin dose     To take:  2.5 mg Take 0.5 of a 5 mg tablet.    To take:  5 mg Take 1 of the 5 mg tablets.    Hold Do not take your warfarin dose. See the Details table to the right for additional instructions.                June 2018 Details    Sun Mon Tue Wed  Thu Fri Sat          1      2.5 mg         2      2.5 mg           3      2.5 mg         4      2.5 mg         5      5 mg         6      2.5 mg         7      5 mg         8            9                 10               11               12               13               14               15               16                 17               18               19               20               21               22               23                 24               25               26               27               28               29               30                Date Details   No additional details    Date of next INR:  6/8/2018         How to take your warfarin dose     To take:  2.5 mg Take 0.5 of a 5 mg tablet.    To take:  5 mg Take 1 of the 5 mg tablets.

## 2018-05-24 NOTE — PROGRESS NOTES
ANTICOAGULATION FOLLOW-UP CLINIC VISIT    Patient Name:  Haresh Rock  Date:  5/24/2018  Contact Type:  Face to Face    SUBJECTIVE:     Patient Findings     Positives No Problem Findings, Unexplained INR or factor level change           OBJECTIVE    INR Protime   Date Value Ref Range Status   05/24/2018 3.9 (A) 0.86 - 1.14 Final     Factor 2 Assay   Date Value Ref Range Status   05/01/2007 58 (L) 60 - 140 % Final     Comment:     (Note)  CALLED TO TAMARA(INTERV. RADIOLOGY)[id number],        ASSESSMENT / PLAN  INR assessment SUPRA    Recheck INR In: 2 WEEKS    INR Location Clinic      Anticoagulation Summary as of 5/24/2018     INR goal 2.0-3.0   Today's INR 3.9!   Warfarin maintenance plan 5 mg (5 mg x 1) on Tue, Thu; 2.5 mg (5 mg x 0.5) all other days   Full warfarin instructions 5/25: Hold; 5/26: Hold; Otherwise 5 mg on Tue, Thu; 2.5 mg all other days   Weekly warfarin total 22.5 mg   Plan last modified Belinda Francis RN (4/19/2016)   Next INR check 6/8/2018   Priority INR   Target end date Indefinite    Indications   Long-term (current) use of anticoagulants [Z79.01] [Z79.01]  Atrial fibrillation (H) [I48.91]         Anticoagulation Episode Summary     INR check location     Preferred lab     Send INR reminders to [facility] CL[facility]    Comments       Anticoagulation Care Providers     Provider Role Specialty Phone number    Anya Nowak MD [facility] [phone]            See the Encounter Report to view Anticoagulation Flowsheet and Dosing Calendar (Go to Encounters tab in chart review, and find the Anticoagulation Therapy Visit)    Dosage adjustment made based on physician directed care plan.    Laverne Ferguson, KOSTAS

## 2018-06-08 ENCOUNTER — ANTICOAGULATION THERAPY VISIT (OUTPATIENT)
Dept: NURSING | Facility: CLINIC | Age: 68
End: 2018-06-08
Payer: COMMERCIAL

## 2018-06-08 DIAGNOSIS — I48.0 PAROXYSMAL ATRIAL FIBRILLATION (H): ICD-10-CM

## 2018-06-08 DIAGNOSIS — Z79.01 LONG-TERM (CURRENT) USE OF ANTICOAGULANTS: ICD-10-CM

## 2018-06-08 LAB — INR POINT OF CARE: 4.4 (ref 0.86–1.14)

## 2018-06-08 PROCEDURE — 85610 PROTHROMBIN TIME: CPT | Mod: QW

## 2018-06-08 PROCEDURE — 36416 COLLJ CAPILLARY BLOOD SPEC: CPT

## 2018-06-08 PROCEDURE — 99207 ZZC NO CHARGE NURSE ONLY: CPT

## 2018-06-08 NOTE — MR AVS SNAPSHOT
Haresh Rock   6/8/2018 10:15 AM   Anticoagulation Therapy Visit    Description:  68 year old male   Provider:  TY ANTI COAG   Department:  [facility]           INR as of 6/8/2018     Today's INR 4.4!      Anticoagulation Summary as of 6/8/2018     INR goal 2.0-3.0   Today's INR 4.4!   Full warfarin instructions 6/8: Hold; Otherwise 2.5 mg every day   Next INR check 6/20/2018    Indications   Long-term (current) use of anticoagulants [Z79.01] [Z79.01]  Atrial fibrillation (H) [I48.91]         Your next Anticoagulation Clinic appointment(s)     Jun 08, 2018 10:15 AM CDT   Anticoagulation Visit with  ANTI COAG   [facility] ([address] [phone]            Jun 20, 2018 11:30 AM CDT   Anticoagulation Visit with  ANTI COAG   [facility] ([address] [id number] [phone]              Contact Numbers     [facility]  Please call [phone] to cancel and/or reschedule your appointment   Please call [phone] with any problems or questions regarding your therapy.        June 2018 Details    Sun Mon Tue Wed Thu Fri Sat          1               2                 3               4               5               6               7               8      Hold   See details      9      2.5 mg           10      2.5 mg         11      2.5 mg         12      2.5 mg         13      2.5 mg         14      2.5 mg         15      2.5 mg         16      2.5 mg           17      2.5 mg         18      2.5 mg         19      2.5 mg         20            21               22               23                 24               25               26               27               28               29               30                Date Details   06/08 This INR check       Date of next INR:  6/20/2018         How to take your warfarin dose     To take:  2.5 mg Take 0.5 of a 5 mg tablet.     Hold Do not take your warfarin dose. See the Details table to the right for additional instructions.

## 2018-06-08 NOTE — PROGRESS NOTES
ANTICOAGULATION FOLLOW-UP CLINIC VISIT    Patient Name:  Haresh Rock  Date:  6/8/2018  Contact Type:  Face to Face    SUBJECTIVE:     Patient Findings     Positives No Problem Findings, Unexplained INR or factor level change    Comments Due to multiple supra therapeutic INR's patient's maintenance dose was decreased by 22% today.            OBJECTIVE    INR Protime   Date Value Ref Range Status   06/08/2018 4.4 (A) 0.86 - 1.14 Final     Factor 2 Assay   Date Value Ref Range Status   05/01/2007 58 (L) 60 - 140 % Final     Comment:     (Note)  CALLED TO TAMARA(INTERV. RADIOLOGY)[id number],        ASSESSMENT / PLAN  INR assessment SUPRA    Recheck INR In: 10 DAYS    INR Location Clinic      Anticoagulation Summary as of 6/8/2018     INR goal 2.0-3.0   Today's INR 4.4!   Warfarin maintenance plan 2.5 mg (5 mg x 0.5) every day   Full warfarin instructions 6/8: Hold; Otherwise 2.5 mg every day   Weekly warfarin total 17.5 mg   Plan last modified Laverne Ferguson RN (6/8/2018)   Next INR check 6/20/2018   Priority INR   Target end date Indefinite    Indications   Long-term (current) use of anticoagulants [Z79.01] [Z79.01]  Atrial fibrillation (H) [I48.91]         Anticoagulation Episode Summary     INR check location     Preferred lab     Send INR reminders to [facility]    Comments       Anticoagulation Care Providers     Provider Role Specialty Phone number    Anya Nowak MD [facility] [phone]            See the Encounter Report to view Anticoagulation Flowsheet and Dosing Calendar (Go to Encounters tab in chart review, and find the Anticoagulation Therapy Visit)    Dosage adjustment made based on physician directed care plan.    Laverne Ferguson RN

## 2018-06-20 ENCOUNTER — ANTICOAGULATION THERAPY VISIT (OUTPATIENT)
Dept: NURSING | Facility: CLINIC | Age: 68
End: 2018-06-20
Payer: COMMERCIAL

## 2018-06-20 DIAGNOSIS — I48.0 PAROXYSMAL ATRIAL FIBRILLATION (H): ICD-10-CM

## 2018-06-20 DIAGNOSIS — Z79.01 LONG-TERM (CURRENT) USE OF ANTICOAGULANTS: ICD-10-CM

## 2018-06-20 LAB — INR POINT OF CARE: 2.6 (ref 0.86–1.14)

## 2018-06-20 PROCEDURE — 85610 PROTHROMBIN TIME: CPT | Mod: QW

## 2018-06-20 PROCEDURE — 36416 COLLJ CAPILLARY BLOOD SPEC: CPT

## 2018-06-20 PROCEDURE — 99207 ZZC NO CHARGE NURSE ONLY: CPT

## 2018-06-20 NOTE — MR AVS SNAPSHOT
Haresh Rock   6/20/2018 11:30 AM   Anticoagulation Therapy Visit    Description:  68 year old male   Provider:  TY ANTI COAG   Department:  Ty Nurse           INR as of 6/20/2018     Today's INR 2.6      Anticoagulation Summary as of 6/20/2018     INR goal 2.0-3.0   Today's INR 2.6   Full warfarin instructions 2.5 mg every day   Next INR check 7/11/2018    Indications   Long-term (current) use of anticoagulants [Z79.01] [Z79.01]  Atrial fibrillation (H) [I48.91]         Your next Anticoagulation Clinic appointment(s)     Jun 20, 2018 11:30 AM CDT   Anticoagulation Visit with TY ANTI COAG   [facility] ([place] [id number] [facility] [address] [phone]            Jul 11, 2018 11:15 AM CDT   Anticoagulation Visit with TY ANTI COAG   [facility] ([place] [id number] [facility] [address] [phone]              Contact Numbers     [facility]  Please call [phone] to cancel and/or reschedule your appointment   Please call [phone] with any problems or questions regarding your therapy.        June 2018 Details    Sun Mon Tue Wed Thu Fri Sat          1               2                 3               4               5               6               7               8               9                 10               11               12               13               14               15               16                 17               18               19               20      2.5 mg   See details      21      2.5 mg         22      2.5 mg         23      2.5 mg           24      2.5 mg         25      2.5 mg         26      2.5 mg         27      2.5 mg         28      2.5 mg         29      2.5 mg         30      2.5 mg          Date Details   06/20 This INR check               How to take your warfarin dose     To take:  2.5 mg Take 0.5 of a 5 mg tablet.           July 2018 Details    Sun Mon Tue Wed Thu  Fri Sat     1      2.5 mg         2      2.5 mg         3      2.5 mg         4      2.5 mg         5      2.5 mg         6      2.5 mg         7      2.5 mg           8      2.5 mg         9      2.5 mg         10      2.5 mg         11            12               13               14                 15               16               17               18               19               20               21                 22               23               24               25               26               27               28                 29               30               31                    Date Details   No additional details    Date of next INR:  7/11/2018         How to take your warfarin dose     To take:  2.5 mg Take 0.5 of a 5 mg tablet.

## 2018-06-20 NOTE — PROGRESS NOTES
ANTICOAGULATION FOLLOW-UP CLINIC VISIT    Patient Name:  Haresh Rock  Date:  6/20/2018  Contact Type:  Face to Face    SUBJECTIVE:     Patient Findings     Positives No Problem Findings           OBJECTIVE    INR Protime   Date Value Ref Range Status   06/20/2018 2.6 (A) 0.86 - 1.14 Final     Factor 2 Assay   Date Value Ref Range Status   05/01/2007 58 (L) 60 - 140 % Final     Comment:     (Note)  CALLED TO TAMARA(INTERV. RADIOLOGY)[id number],        ASSESSMENT / PLAN  INR assessment THER    Recheck INR In: 3 WEEKS    INR Location Clinic      Anticoagulation Summary as of 6/20/2018     INR goal 2.0-3.0   Today's INR 2.6   Warfarin maintenance plan 2.5 mg (5 mg x 0.5) every day   Full warfarin instructions 2.5 mg every day   Weekly warfarin total 17.5 mg   No change documented Laverne Ferguson RN   Plan last modified Laverne Ferguson RN (6/8/2018)   Next INR check 7/11/2018   Priority INR   Target end date Indefinite    Indications   Long-term (current) use of anticoagulants [Z79.01] [Z79.01]  Atrial fibrillation (H) [I48.91]         Anticoagulation Episode Summary     INR check location     Preferred lab     Send INR reminders to [facility]    Comments       Anticoagulation Care Providers     Provider Role Specialty Phone number    Anya Nowak MD [facility] [phone]            See the Encounter Report to view Anticoagulation Flowsheet and Dosing Calendar (Go to Encounters tab in chart review, and find the Anticoagulation Therapy Visit)    Dosage adjustment made based on physician directed care plan.    Laverne Ferguson RN

## 2018-07-03 ENCOUNTER — OFFICE VISIT (OUTPATIENT)
Dept: FAMILY MEDICINE | Facility: CLINIC | Age: 68
End: 2018-07-03
Payer: COMMERCIAL

## 2018-07-03 VITALS
OXYGEN SATURATION: 99 % | SYSTOLIC BLOOD PRESSURE: 112 MMHG | DIASTOLIC BLOOD PRESSURE: 60 MMHG | WEIGHT: 207.6 LBS | RESPIRATION RATE: 18 BRPM | BODY MASS INDEX: 28.16 KG/M2 | TEMPERATURE: 96.6 F | HEART RATE: 72 BPM

## 2018-07-03 DIAGNOSIS — I48.0 PAROXYSMAL ATRIAL FIBRILLATION (H): ICD-10-CM

## 2018-07-03 DIAGNOSIS — R06.09 DOE (DYSPNEA ON EXERTION): ICD-10-CM

## 2018-07-03 DIAGNOSIS — R00.2 PALPITATIONS: Primary | ICD-10-CM

## 2018-07-03 PROCEDURE — 99214 OFFICE O/P EST MOD 30 MIN: CPT | Performed by: PHYSICIAN ASSISTANT

## 2018-07-03 PROCEDURE — 93000 ELECTROCARDIOGRAM COMPLETE: CPT | Performed by: PHYSICIAN ASSISTANT

## 2018-07-03 NOTE — MR AVS SNAPSHOT
After Visit Summary   7/3/2018    Haresh Rock    MRN: [id number]           Patient Information     Date Of Birth          1950        Visit Information        Provider Department      7/3/2018 10:20 AM Leyla Walter PA-C [facility]        Today's Diagnoses     Palpitations    -  1    GAY (dyspnea on exertion)           Follow-ups after your visit        Your next 10 appointments already scheduled     Jul 11, 2018 11:15 AM CDT   Anticoagulation Visit with NICOLAS ANTI COAG   [facility] ([facility])    [address] [facility] [address]   [phone]            Nov 20, 2018 10:00 AM CST   [facility] with  [facility] DRAW   [facility] Draw ([facility])    [address] [place] [address]  [address]  [facility] [id number] [phone]            Nov 20, 2018 10:30 AM CST   Return with Augusto Miller MD   [facility] Blood and Marrow Transplant ([facility])    [address] [place] [address]  [address] [place] [id number] [phone]              Future tests that were ordered for you today     Open Future Orders        Priority Expected Expires Ordered    Exercise Stress Echocardiogram Routine  7/3/2019 7/3/2018            Who to contact     Normal or non-critical lab and imaging results will be communicated to you by MyChart, letter or phone within 4 business days after the clinic has received the results. If you do not hear from us within 7 days, please contact the clinic through MyChart or phone. If you have a critical or abnormal lab result, we will notify you by phone as soon as possible.  Submit refill requests through [employer] or call your pharmacy and they will forward the refill request to us. Please allow 3 business days for your refill to be completed.          If you need to speak with a  for additional information , please call: [phone]              Additional Information About Your Visit        [employer] Information     [employer] gives you secure access to your electronic health record. If you see a primary care provider, you can also send messages to your care team and make appointments. If you have questions, please call your primary care clinic.  If you do not have a primary care provider, please call [phone] and they will assist you.        Care EveryWhere ID     This is your Care EveryWhere ID. This could be used by other organizations to access your [facility] medical records  [id number]        Your Vitals Were     Pulse Temperature Respirations Pulse Oximetry BMI (Body Mass Index)       72 96.6  F (35.9  C) (Tympanic) 18 99% 28.16 kg/m2        Blood Pressure from Last 3 Encounters:   07/03/18 152/81   05/22/18 131/81   04/09/18 116/78    Weight from Last 3 Encounters:   07/03/18 207 lb 9.6 oz (94.2 kg)   05/22/18 209 lb 6.4 oz (95 kg)   04/09/18 207 lb (93.9 kg)              We Performed the Following     EKG 12-lead complete w/read - Clinics        Primary Care Provider Office Phone # Fax #    Anya Nowak [phone] [address] [facility] [address]        Equal Access to Services     NAZ MONGE : Hadii aad ku hadasho Soomaali, waaxda luqadaha, qaybta kaalmada adeegyada, waxay idiin haykailashn cindi martinez lajosh . So [facility] [phone].    ATENCIÓN: Si habla español, tiene a barger disposición servicios gratYokaos de asistencia lingüística. Llame al [phone].    We comply with applicable federal civil rights laws and [place] laws. We do not discriminate on the basis of race, color, national origin, age, disability, sex, sexual orientation, or gender identity.            Thank you!     Thank you for choosing [facility]  for your care. Our goal is always to provide you with excellent care. Hearing back from our patients is one way we can continue to improve our services. Please take a few minutes to  complete the written survey that you may receive in the mail after your visit with us. Thank you!             Your Updated Medication List - Protect others around you: Learn how to safely use, store and throw away your medicines at www.disposemymeds.org.          This list is accurate as of 7/3/18 11:03 AM.  Always use your most recent med list.                   Brand Name Dispense Instructions for use Diagnosis    ARTIFICIAL TEAR SOLUTION OP      Apply  to eye. USE AS DIRECTED(DF)        CENTRUM PO      1 TABLET DAILY        folic acid 1 MG tablet    FOLVITE    90 tablet    Take 1 tablet (1,000 mcg) by mouth daily    Oouxz-atuaza-nefw disease, chronic (H), Lymphoma, unspecified body region, unspecified lymphoma type (H)       NYQUIL PO      Take by mouth as needed        pantoprazole 20 MG EC tablet    PROTONIX    90 tablet    Take 1 tablet (20 mg) by mouth At Bedtime    Cmyjc-lobdql-wuoi disease, chronic (H), Hodgkin's disease of extranodal or solid organ site (H)       PREVIDENT 5000 DRY MOUTH 1.1 % Gel topical gel   Generic drug:  sodium fluoride dental gel      Take by mouth daily    Hodgkin's disease of extranodal or solid organ site (H), Status post bone marrow transplant (H), Hereditary hemochromatosis (H), Thyroid nodule       simvastatin 10 MG tablet    ZOCOR    90 tablet    TAKE 1 TABLET (10 MG) BY MOUTH AT BEDTIME    Hyperlipidemia with target LDL less than 100       sotalol 80 MG tablet    BETAPACE    90 tablet    Take 0.5 tablets (40 mg) by mouth 2 times daily Follow up visit needed with Dr Matt    Paroxysmal atrial fibrillation (H)       warfarin 5 MG tablet    COUMADIN    60 tablet    5 mg on Tue, Thu; 2.5 mg all other days or as directed by coumadin clinic    Atrial fibrillation, unspecified type (H), Antiphospholipid antibody syndrome (H)

## 2018-07-03 NOTE — PROGRESS NOTES
SUBJECTIVE:   Haresh Rock is a 68 year old male who presents to clinic today for the following health issues:      Hypertension Follow-up      Outpatient blood pressures are not being checked.    Low Salt Diet: no added salt      Amount of exercise or physical activity: None    Problems taking medications regularly: No    Medication side effects: none    Diet: low salt        Dizziness/light headed       Duration: x3wks    Description   Feeling faint:  YES  Feeling like the surroundings are moving: no   Loss of consciousness or falls: no     Intensity:  mild    Accompanying signs and symptoms:   Nausea/vomitting: no   Palpitations: YES- has been keeping track   Weakness in arms or legs: no   Vision or speech changes: no   Ringing in ears (Tinnitus): no   Hearing loss related to dizziness: no   Other (fevers/chills/sweating/dyspnea): no     History (similar episodes/head trauma/previous evaluation/recent bleeding): None    Precipitating or alleviating factors (new meds/chemicals): None  Worse with activity/head movement: no     Therapies tried and outcome: None    Has been noticing more shortness of breath when he's going up stairs  Denies chest pain   Doesn't seem to be correlated with his palpitations       Problem list and histories reviewed & adjusted, as indicated.  Additional history: as documented    Patient Active Problem List   Diagnosis     Hemochromatosis     Antiphospholipid antibody syndrome (H)     Gcbge-zpgldr-emkk disease, chronic (H)     Advanced directives, counseling/discussion     Polyneuropathy     Status post total knee replacements     Status post bone marrow transplant (H)     Hyperlipidemia with target LDL less than 100     ED (erectile dysfunction)     Atrial fibrillation (H)     Personal history of DVT (deep vein thrombosis)     Chronic rhinitis     Gallstones without obstruction of gallbladder     Long-term (current) use of anticoagulants [Z79.01]     Thyroid nodule     CKD (chronic  kidney disease) stage 2, GFR 60-89 ml/min     Type 2 diabetes mellitus with stage 2 chronic kidney disease (H)     Renal hypertension     History of Hodgkin's lymphoma     History of irradiation, presenting hazards to health     Primary pulmonary hypertension (H)     Past Surgical History:   Procedure Laterality Date     ANESTHESIA CARDIOVERSION  4/21/2011    Procedure:ANESTHESIA CARDIOVERSION; Surgeon:GENERIC ANESTHESIA PROVIDER; Location:[facility] OR     ARTHROSCOPY KNEE RT/LT      LT     CATARACT IOL, RT/LT       COLONOSCOPY  10/16/2012    Procedure: COLONOSCOPY;  Colonoscopy, screening;  Surgeon: Reg Feliciano MD;  Location: [facility] OR     H ABLATION FOCAL AFIB  3/5/2013    PVI     H ABLATION FOCAL AFIB  01/01/2018     HC BONE MARROW/STEM TRANSPLANT ALLOGENIC  5/8/2007     INSERT PORT VASCULAR ACCESS       JOINT REPLACEMTN, KNEE RT/LT  3/28/12    SHAWN     VASECTOMY  1990       Social History   Substance Use Topics     Smoking status: Never Smoker     Smokeless tobacco: Never Used     Alcohol use No     Family History   Problem Relation Age of Onset     Hypertension Mother      Cerebrovascular Disease Mother      Arrhythmia Brother      Arrhythmia Sister      Alzheimer Disease Father      Diabetes Paternal Aunt      GASTROINTESTINAL DISEASE Maternal Grandmother      colitis      Thyroid Disease Sister      Cancer No family hx of      C.A.D. No family hx of      Thyroid Cancer No family hx of            Reviewed and updated as needed this visit by clinical staff  Tobacco  Allergies  Meds       Reviewed and updated as needed this visit by Provider         ROS:  Constitutional, cardiovascular systems are negative, except as otherwise noted.    OBJECTIVE:                                                    /60  Pulse 72  Temp 96.6  F (35.9  C) (Tympanic)  Resp 18  Wt 207 lb 9.6 oz (94.2 kg)  SpO2 99%  BMI 28.16 kg/m2  Body mass index is 28.16 kg/(m^2).  GENERAL APPEARANCE: healthy, alert and no distress  MS:  extremities normal- no gross deformities noted  NEURO: Normal strength and tone  PSYCH: mentation appears normal and affect normal/bright    Diagnostic test results:  Diagnostic Test Results:  none   EKG - appears normal, NSR, normal axis, normal intervals, no acute ST/T changes c/w ischemia, no LVH by voltage criteria, occasional PVC noted, unifocal, unchanged from previous tracings     ASSESSMENT:                                                      1. Palpitations    2. GAY (dyspnea on exertion)    3. Paroxysmal atrial fibrillation (H)         PLAN:                                                    Will obtain a stress echo for further evaluation. His Holter from March was reviewed along with his EKG from today. He does follow up with cardiology as needed; has a history of 2 ablations.     The patient was in agreement with the plan today and had no questions or concerns prior to leaving the clinic.     Leyla Walter PA-C  [facility]

## 2018-07-11 ENCOUNTER — ANTICOAGULATION THERAPY VISIT (OUTPATIENT)
Dept: NURSING | Facility: CLINIC | Age: 68
End: 2018-07-11
Payer: COMMERCIAL

## 2018-07-11 DIAGNOSIS — Z79.01 LONG-TERM (CURRENT) USE OF ANTICOAGULANTS: ICD-10-CM

## 2018-07-11 DIAGNOSIS — I48.0 PAROXYSMAL ATRIAL FIBRILLATION (H): ICD-10-CM

## 2018-07-11 LAB — INR POINT OF CARE: 2.2 (ref 0.86–1.14)

## 2018-07-11 PROCEDURE — 99207 ZZC NO CHARGE NURSE ONLY: CPT

## 2018-07-11 PROCEDURE — 36416 COLLJ CAPILLARY BLOOD SPEC: CPT

## 2018-07-11 PROCEDURE — 85610 PROTHROMBIN TIME: CPT | Mod: QW

## 2018-07-11 NOTE — PROGRESS NOTES
ANTICOAGULATION FOLLOW-UP CLINIC VISIT    Patient Name:  Haresh Rock  Date:  7/11/2018  Contact Type:  Face to Face    SUBJECTIVE:     Patient Findings     Positives No Problem Findings           OBJECTIVE    INR Protime   Date Value Ref Range Status   07/11/2018 2.2 (A) 0.86 - 1.14 Final     Factor 2 Assay   Date Value Ref Range Status   05/01/2007 58 (L) 60 - 140 % Final     Comment:     (Note)  CALLED TO TAMARA(INTERV. RADIOLOGY)[id number],        ASSESSMENT / PLAN  INR assessment THER    Recheck INR In: 4 WEEKS    INR Location Clinic      Anticoagulation Summary as of 7/11/2018     INR goal 2.0-3.0   Today's INR 2.2   Warfarin maintenance plan 2.5 mg (5 mg x 0.5) every day   Full warfarin instructions 2.5 mg every day   Weekly warfarin total 17.5 mg   No change documented Laverne Ferguson RN   Plan last modified Laverne Ferguson RN (6/8/2018)   Next INR check 8/8/2018   Priority INR   Target end date Indefinite    Indications   Long-term (current) use of anticoagulants [Z79.01] [Z79.01]  Atrial fibrillation (H) [I48.91]         Anticoagulation Episode Summary     INR check location     Preferred lab     Send INR reminders to [facility]    Comments       Anticoagulation Care Providers     Provider Role Specialty Phone number    Anya Nowak MD [facility] [phone]            See the Encounter Report to view Anticoagulation Flowsheet and Dosing Calendar (Go to Encounters tab in chart review, and find the Anticoagulation Therapy Visit)    Dosage adjustment made based on physician directed care plan.    Laverne Ferguson RN

## 2018-07-11 NOTE — MR AVS SNAPSHOT
Haresh Rock   7/11/2018 11:15 AM   Anticoagulation Therapy Visit    Description:  68 year old male   Provider:  TY ANTI COAG   Department:  Ty Nurse           INR as of 7/11/2018     Today's INR 2.2      Anticoagulation Summary as of 7/11/2018     INR goal 2.0-3.0   Today's INR 2.2   Full warfarin instructions 2.5 mg every day   Next INR check 8/8/2018    Indications   Long-term (current) use of anticoagulants [Z79.01] [Z79.01]  Atrial fibrillation (H) [I48.91]         Your next Anticoagulation Clinic appointment(s)     Aug 08, 2018 11:15 AM CDT   Anticoagulation Visit with TY ANTI COAG   [facility] ([facility]    [address] [address] [phone]              Contact Numbers     [facility]  Please call [phone] to cancel and/or reschedule your appointment   Please call [phone] with any problems or questions regarding your therapy.        July 2018 Details    Sun Mon Tue Wed Thu Fri Sat     1               2               3               4               5               6               7                 8               9               10               11      2.5 mg   See details      12      2.5 mg         13      2.5 mg         14      2.5 mg           15      2.5 mg         16      2.5 mg         17      2.5 mg         18      2.5 mg         19      2.5 mg         20      2.5 mg         21      2.5 mg           22      2.5 mg         23      2.5 mg         24      2.5 mg         25      2.5 mg         26      2.5 mg         27      2.5 mg         28      2.5 mg           29      2.5 mg         30      2.5 mg         31      2.5 mg              Date Details   07/11 This INR check               How to take your warfarin dose     To take:  2.5 mg Take 0.5 of a 5 mg tablet.           August 2018 Details    Sun Mon Tue Wed Thu Fri Sat        1      2.5 mg         2      2.5 mg         3      2.5 mg         4      2.5 mg           5      2.5 mg          6      2.5 mg         7      2.5 mg         8            9               10               11                 12               13               14               15               16               17               18                 19               20               21               22               23               24               25                 26               27               28               29               30               31                 Date Details   No additional details    Date of next INR:  8/8/2018         How to take your warfarin dose     To take:  2.5 mg Take 0.5 of a 5 mg tablet.

## 2018-07-25 ENCOUNTER — RADIANT APPOINTMENT (OUTPATIENT)
Dept: CARDIOLOGY | Facility: CLINIC | Age: 68
End: 2018-07-25
Attending: PHYSICIAN ASSISTANT
Payer: COMMERCIAL

## 2018-07-25 DIAGNOSIS — R06.09 DOE (DYSPNEA ON EXERTION): ICD-10-CM

## 2018-07-25 PROCEDURE — 93321 DOPPLER ECHO F-UP/LMTD STD: CPT | Mod: TC | Performed by: INTERNAL MEDICINE

## 2018-07-25 PROCEDURE — 93016 CV STRESS TEST SUPVJ ONLY: CPT | Performed by: INTERNAL MEDICINE

## 2018-07-25 PROCEDURE — 93350 STRESS TTE ONLY: CPT | Mod: TC | Performed by: INTERNAL MEDICINE

## 2018-07-25 PROCEDURE — 93321 DOPPLER ECHO F-UP/LMTD STD: CPT | Mod: 26 | Performed by: INTERNAL MEDICINE

## 2018-07-25 PROCEDURE — 93018 CV STRESS TEST I&R ONLY: CPT | Performed by: INTERNAL MEDICINE

## 2018-07-25 PROCEDURE — 93350 STRESS TTE ONLY: CPT | Mod: 26 | Performed by: INTERNAL MEDICINE

## 2018-07-25 PROCEDURE — 93325 DOPPLER ECHO COLOR FLOW MAPG: CPT | Mod: 26 | Performed by: INTERNAL MEDICINE

## 2018-07-25 PROCEDURE — 93017 CV STRESS TEST TRACING ONLY: CPT | Performed by: INTERNAL MEDICINE

## 2018-07-25 PROCEDURE — 40000264 ECHO STRESS WITH OPTISON: Performed by: INTERNAL MEDICINE

## 2018-07-25 PROCEDURE — 93325 DOPPLER ECHO COLOR FLOW MAPG: CPT | Mod: TC | Performed by: INTERNAL MEDICINE

## 2018-07-25 PROCEDURE — 93352 ADMIN ECG CONTRAST AGENT: CPT | Performed by: INTERNAL MEDICINE

## 2018-07-25 RX ADMIN — Medication 3 ML: at 14:00

## 2018-07-25 NOTE — NURSING NOTE
Bicycle Stress Echocardiogram with Optison performed.  IV started in LAC.    Optison:   3ml Optison mixed with 6ml Saline - [id number]  Amount used: 3.0ml, 6.0ml wasted

## 2018-07-27 DIAGNOSIS — I48.91 ATRIAL FIBRILLATION, UNSPECIFIED TYPE (H): ICD-10-CM

## 2018-07-27 DIAGNOSIS — D68.61 ANTIPHOSPHOLIPID ANTIBODY SYNDROME (H): ICD-10-CM

## 2018-07-27 NOTE — TELEPHONE ENCOUNTER
Received fax on 18 from [facility] hospitals. requesting refill(s) for the following medication(s):    1.   Rx Number:  [id number]   Drug:  Warfarin Sodium 5 MG        Si mg. On tues.,thurs. 2.5 mg. all other days or as directed by coumadin clinic   Prescribed Qty:  60    Last Refill:  18     Requested P/U Time:  18      Patient's Last Cardiology Appointment:  18   Patient's Next Cardiology Appointment:  2019      Refill encounter routed to devyn De La Cruz L.P.N.

## 2018-07-31 RX ORDER — WARFARIN SODIUM 5 MG/1
2.5 TABLET ORAL DAILY
Qty: 45 TABLET | Refills: 1 | Status: SHIPPED | OUTPATIENT
Start: 2018-07-31 | End: 2018-09-24

## 2018-07-31 NOTE — TELEPHONE ENCOUNTER
[place] [facility] follows patient's INR's and doses his coumadin.  Rx sent to [facility] to refill.    Tiffany Mckeon RN

## 2018-08-08 ENCOUNTER — ANTICOAGULATION THERAPY VISIT (OUTPATIENT)
Dept: NURSING | Facility: CLINIC | Age: 68
End: 2018-08-08
Payer: COMMERCIAL

## 2018-08-08 DIAGNOSIS — Z79.01 LONG-TERM (CURRENT) USE OF ANTICOAGULANTS: ICD-10-CM

## 2018-08-08 DIAGNOSIS — I48.0 PAROXYSMAL ATRIAL FIBRILLATION (H): ICD-10-CM

## 2018-08-08 LAB — INR POINT OF CARE: 2.5 (ref 0.86–1.14)

## 2018-08-08 PROCEDURE — 99207 ZZC NO CHARGE NURSE ONLY: CPT

## 2018-08-08 PROCEDURE — 85610 PROTHROMBIN TIME: CPT | Mod: QW

## 2018-08-08 PROCEDURE — 36416 COLLJ CAPILLARY BLOOD SPEC: CPT

## 2018-08-08 NOTE — PROGRESS NOTES
ANTICOAGULATION FOLLOW-UP CLINIC VISIT    Patient Name:  Haresh Rock  Date:  8/8/2018  Contact Type:  Face to Face    SUBJECTIVE:     Patient Findings     Positives No Problem Findings           OBJECTIVE    INR Protime   Date Value Ref Range Status   08/08/2018 2.5 (A) 0.86 - 1.14 Final     Factor 2 Assay   Date Value Ref Range Status   05/01/2007 58 (L) 60 - 140 % Final     Comment:     (Note)  CALLED TO TMAARA(INTERV. RADIOLOGY)[id number],        ASSESSMENT / PLAN  INR assessment THER    Recheck INR In: 5 WEEKS    INR Location Clinic      Anticoagulation Summary as of 8/8/2018     INR goal 2.0-3.0   Today's INR 2.5   Warfarin maintenance plan 2.5 mg (5 mg x 0.5) every day   Full warfarin instructions 2.5 mg every day   Weekly warfarin total 17.5 mg   No change documented Laverne Ferguson RN   Plan last modified Laverne Ferguson RN (6/8/2018)   Next INR check 9/12/2018   Priority INR   Target end date Indefinite    Indications   Long-term (current) use of anticoagulants [Z79.01] [Z79.01]  Atrial fibrillation (H) [I48.91]         Anticoagulation Episode Summary     INR check location     Preferred lab     Send INR reminders to [facility]    Comments       Anticoagulation Care Providers     Provider Role Specialty Phone number    Anya Nowak MD [facility] [phone]            See the Encounter Report to view Anticoagulation Flowsheet and Dosing Calendar (Go to Encounters tab in chart review, and find the Anticoagulation Therapy Visit)    Dosage adjustment made based on physician directed care plan.    Laverne Ferguson RN

## 2018-08-08 NOTE — MR AVS SNAPSHOT
Haresh Rock   8/8/2018 1:45 PM   Anticoagulation Therapy Visit    Description:  68 year old male   Provider:  TY ANTI COAG   Department:  Ty Nurse           INR as of 8/8/2018     Today's INR 2.5      Anticoagulation Summary as of 8/8/2018     INR goal 2.0-3.0   Today's INR 2.5   Full warfarin instructions 2.5 mg every day   Next INR check 9/12/2018    Indications   Long-term (current) use of anticoagulants [Z79.01] [Z79.01]  Atrial fibrillation (H) [I48.91]         Your next Anticoagulation Clinic appointment(s)     Aug 08, 2018  1:45 PM CDT   Anticoagulation Visit with TY ANTI COAG   [facility] ([place] [id number] [facility] [address] [phone]            Sep 12, 2018 10:30 AM CDT   Anticoagulation Visit with TY ANTI COAG   [facility] ([address] [address] [phone]              Contact Numbers     [facility]  Please call [phone] to cancel and/or reschedule your appointment   Please call [phone] with any problems or questions regarding your therapy.        August 2018 Details    Sun Mon Tue Wed Thu Fri Sat        1               2               3               4                 5               6               7               8      2.5 mg   See details      9      2.5 mg         10      2.5 mg         11      2.5 mg           12      2.5 mg         13      2.5 mg         14      2.5 mg         15      2.5 mg         16      2.5 mg         17      2.5 mg         18      2.5 mg           19      2.5 mg         20      2.5 mg         21      2.5 mg         22      2.5 mg         23      2.5 mg         24      2.5 mg         25      2.5 mg           26      2.5 mg         27      2.5 mg         28      2.5 mg         29      2.5 mg         30      2.5 mg         31      2.5 mg           Date Details   08/08 This INR check               How to take your warfarin dose     To  take:  2.5 mg Take 0.5 of a 5 mg tablet.           September 2018 Details    Sun Mon Tue Wed Thu Fri Sat           1      2.5 mg           2      2.5 mg         3      2.5 mg         4      2.5 mg         5      2.5 mg         6      2.5 mg         7      2.5 mg         8      2.5 mg           9      2.5 mg         10      2.5 mg         11      2.5 mg         12            13               14               15                 16               17               18               19               20               21               22                 23               24               25               26               27               28               29                 30                      Date Details   No additional details    Date of next INR:  9/12/2018         How to take your warfarin dose     To take:  2.5 mg Take 0.5 of a 5 mg tablet.

## 2018-09-12 ENCOUNTER — ANTICOAGULATION THERAPY VISIT (OUTPATIENT)
Dept: NURSING | Facility: CLINIC | Age: 68
End: 2018-09-12
Payer: COMMERCIAL

## 2018-09-12 DIAGNOSIS — Z79.01 LONG-TERM (CURRENT) USE OF ANTICOAGULANTS: ICD-10-CM

## 2018-09-12 DIAGNOSIS — I48.0 PAROXYSMAL ATRIAL FIBRILLATION (H): ICD-10-CM

## 2018-09-12 DIAGNOSIS — Z23 NEED FOR PROPHYLACTIC VACCINATION AND INOCULATION AGAINST INFLUENZA: Primary | ICD-10-CM

## 2018-09-12 LAB — INR POINT OF CARE: 3.2 (ref 0.86–1.14)

## 2018-09-12 PROCEDURE — 90662 IIV NO PRSV INCREASED AG IM: CPT

## 2018-09-12 PROCEDURE — 36416 COLLJ CAPILLARY BLOOD SPEC: CPT

## 2018-09-12 PROCEDURE — G0008 ADMIN INFLUENZA VIRUS VAC: HCPCS

## 2018-09-12 PROCEDURE — 85610 PROTHROMBIN TIME: CPT | Mod: QW

## 2018-09-12 PROCEDURE — 99207 ZZC NO CHARGE NURSE ONLY: CPT

## 2018-09-12 NOTE — MR AVS SNAPSHOT
Haresh Rock   9/12/2018 10:30 AM   Anticoagulation Therapy Visit    Description:  68 year old male   Provider:  TY ANTI COAG   Department:  Ty Nurse           INR as of 9/12/2018     Today's INR 3.2!      Anticoagulation Summary as of 9/12/2018     INR goal 2.0-3.0   Today's INR 3.2!   Full warfarin instructions 2.5 mg every day   Next INR check 10/17/2018    Indications   Long-term (current) use of anticoagulants [Z79.01] [Z79.01]  Atrial fibrillation (H) [I48.91]         Your next Anticoagulation Clinic appointment(s)     Oct 17, 2018 10:45 AM CDT   Anticoagulation Visit with TY ANTI LEONIDES   [facility] ([facility]    [address] [address] [phone]              Contact Numbers     [facility]  Please call [phone] to cancel and/or reschedule your appointment   Please call [phone] with any problems or questions regarding your therapy.        September 2018 Details    Sun Mon Tue Wed Thu Fri Sat           1                 2               3               4               5               6               7               8                 9               10               11               12      2.5 mg   See details      13      2.5 mg         14      2.5 mg         15      2.5 mg           16      2.5 mg         17      2.5 mg         18      2.5 mg         19      2.5 mg         20      2.5 mg         21      2.5 mg         22      2.5 mg           23      2.5 mg         24      2.5 mg         25      2.5 mg         26      2.5 mg         27      2.5 mg         28      2.5 mg         29      2.5 mg           30      2.5 mg                Date Details   09/12 This INR check               How to take your warfarin dose     To take:  2.5 mg Take 0.5 of a 5 mg tablet.           October 2018 Details    Sun Mon Tue Wed Thu Fri Sat      1      2.5 mg         2      2.5 mg         3      2.5 mg         4      2.5 mg         5      2.5 mg         6       2.5 mg           7      2.5 mg         8      2.5 mg         9      2.5 mg         10      2.5 mg         11      2.5 mg         12      2.5 mg         13      2.5 mg           14      2.5 mg         15      2.5 mg         16      2.5 mg         17            18               19               20                 21               22               23               24               25               26               27                 28               29               30               31                   Date Details   No additional details    Date of next INR:  10/17/2018         How to take your warfarin dose     To take:  2.5 mg Take 0.5 of a 5 mg tablet.

## 2018-09-12 NOTE — PROGRESS NOTES
Injectable Influenza Immunization Documentation    1.  Is the person to be vaccinated sick today?   No    2. Does the person to be vaccinated have an allergy to a component   of the vaccine?   No  Egg Allergy Algorithm Link    3. Has the person to be vaccinated ever had a serious reaction   to influenza vaccine in the past?   No    4. Has the person to be vaccinated ever had Guillain-Barré syndrome?   No    Form completed by Laverne Ferguson RN - BC               ANTICOAGULATION FOLLOW-UP CLINIC VISIT    Patient Name:  Haresh Rock  Date:  9/12/2018  Contact Type:  Face to Face    SUBJECTIVE:     Patient Findings     Positives No Problem Findings    Comments Bleeding Signs/Symptoms: NO  Thromboembolic Signs/Symptoms: NO     Medication Changes:  NO  Dietary Changes:  NO  Bacterial/Viral Infection: NO     Missed Coumadin Doses: NO  Other Concerns:  NO             OBJECTIVE    INR Protime   Date Value Ref Range Status   09/12/2018 3.2 (A) 0.86 - 1.14 Final     Factor 2 Assay   Date Value Ref Range Status   05/01/2007 58 (L) 60 - 140 % Final     Comment:     (Note)  CALLED TO [facility](INTERV. RADIOLOGY)[id number],        ASSESSMENT / PLAN  No question data found.  Anticoagulation Summary as of 9/12/2018     INR goal 2.0-3.0   Today's INR 3.2!   Warfarin maintenance plan 2.5 mg (5 mg x 0.5) every day   Full warfarin instructions 2.5 mg every day   Weekly warfarin total 17.5 mg   No change documented Laverne Ferguson RN   Plan last modified Laverne Ferguson RN (6/8/2018)   Next INR check 10/17/2018   Priority INR   Target end date Indefinite    Indications   Long-term (current) use of anticoagulants [Z79.01] [Z79.01]  Atrial fibrillation (H) [I48.91]         Anticoagulation Episode Summary     INR check location     Preferred lab     Send INR reminders to [facility]    Comments       Anticoagulation Care Providers     Provider Role Specialty Phone number    Anya Nowak MD [facility] [phone]             See the Encounter Report to view Anticoagulation Flowsheet and Dosing Calendar (Go to Encounters tab in chart review, and find the Anticoagulation Therapy Visit)    Dosage adjustment made based on physician directed care plan.    Laverne Ferguson RN

## 2018-09-13 ENCOUNTER — TELEPHONE (OUTPATIENT)
Dept: CARDIOLOGY | Facility: CLINIC | Age: 68
End: 2018-09-13

## 2018-09-13 NOTE — TELEPHONE ENCOUNTER
"Patient called [facility] to report an episode of Atrial fibrillation last night that lasted 16 hours and his heart rate was 120 bpm. Patient had a flu shot yesterday at the INR clinic,and was experiencing  some chills and had a  Temperature of 100.3F. Pt  took 2 tablets of Tylenol extra strength, this morning Temperature went down to 98.3F . Patient denied any other symptoms such as dizziness,light headedness nor chest pain.     Patient stated:\" I used to have  some fast heart beat that last 4 hours two times a  week but never 16 hours\". Patient denied any other symptoms along with that.    Patient was able to participate at a lunch meeting today reported feeling good. Patient insisted to be scheduled as soon as possible to see a cardiologist.Patient also reported compliance with current medication regimen.     Writer advised patient to call 911 or go to ED if become symptomatic or if AFIB become severe or disabling,and to monitor temperature. Patient is scheduled to see Dr Winters tomorrow at 2:30 PM in [place]. Patient is in agreement with plan and verbalized understanding to all health information given via telephone call.    Ernestina De La Rosa RN      "

## 2018-09-14 ENCOUNTER — OFFICE VISIT (OUTPATIENT)
Dept: CARDIOLOGY | Facility: CLINIC | Age: 68
End: 2018-09-14
Payer: COMMERCIAL

## 2018-09-14 ENCOUNTER — TRANSFERRED RECORDS (OUTPATIENT)
Dept: HEALTH INFORMATION MANAGEMENT | Facility: CLINIC | Age: 68
End: 2018-09-14

## 2018-09-14 VITALS
WEIGHT: 205 LBS | SYSTOLIC BLOOD PRESSURE: 118 MMHG | OXYGEN SATURATION: 95 % | BODY MASS INDEX: 27.8 KG/M2 | DIASTOLIC BLOOD PRESSURE: 85 MMHG | HEART RATE: 133 BPM

## 2018-09-14 DIAGNOSIS — I47.10 PAROXYSMAL SUPRAVENTRICULAR TACHYCARDIA (H): Primary | ICD-10-CM

## 2018-09-14 PROCEDURE — 99215 OFFICE O/P EST HI 40 MIN: CPT | Performed by: INTERNAL MEDICINE

## 2018-09-14 PROCEDURE — 93000 ELECTROCARDIOGRAM COMPLETE: CPT | Performed by: INTERNAL MEDICINE

## 2018-09-14 RX ORDER — METOPROLOL SUCCINATE 25 MG/1
25 TABLET, EXTENDED RELEASE ORAL DAILY
Qty: 30 TABLET | Status: CANCELLED | OUTPATIENT
Start: 2018-09-14

## 2018-09-14 RX ORDER — METOPROLOL TARTRATE 25 MG/1
12.5 TABLET, FILM COATED ORAL EVERY 6 HOURS PRN
Qty: 30 TABLET | Refills: 0 | Status: SHIPPED | OUTPATIENT
Start: 2018-09-14 | End: 2018-09-18

## 2018-09-14 NOTE — MR AVS SNAPSHOT
After Visit Summary   9/14/2018    Haresh Rock    MRN: [id number]           Patient Information     Date Of Birth          1950        Visit Information        Provider Department      9/14/2018 2:30 PM Darcy Winters MD [facility] PHYSIC[facility] AT [facility]        Today's Diagnoses     Paroxysmal supraventricular tachycardia (H)    -  1      Care Instructions    Thank you for coming to the [facility]; please note the following instructions:    1. Take metoprolol 12.5 mg by mouth (half tab) every 6 hours as needed for additional control if HR > 110 bpm persistently   2. Attempt vagal maneuvers (strain) as we practiced in clinic and monitor HR  3. Please follow up with EP clinic next week.  PRISCA Rodriguez   4. If HR stays >130-140 or severe symptoms develop, please come to [facility]        If you have any questions regarding your visit please contact your care team:     Cardiology  Telephone Number   Tiffany PÉREZ, RN  Nina COLON, RN   Mandi RAMIREZ, QASIM NEFF, MA   [phone]    *After hours: [phone]   For scheduling appts:     [phone] or    [phone] (select option 1)    *After hours: [phone]     For the Device Clinic (Pacemakers and ICD's)  RN's :  Chelsey Carrera   During business hours: [phone]    *After business hours:  [phone] (select option 4)      Normal test result notifications will be released via SEA or mailed within 7 business days.  All other test results, will be communicated via telephone once reviewed by your cardiologist.    If you need a medication refill please contact your pharmacy.  Please allow 3 business days for your refill to be completed.    As always, thank you for trusting us with your health care needs!            Follow-ups after your visit        Your next 10 appointments already scheduled     Sep 18, 2018  3:15 PM CDT   Return Visit with DEJAH Alegre  CNP   [facility] PHYSICIANS HEART AT [facility] ([facility])    [address]  [place] [address]   [phone]            Oct 17, 2018 10:45 AM CDT   Anticoagulation Visit with NICOLAS ANTI COAG   [facility]    [address]  [place] [address]   [phone]            Nov 20, 2018 10:00 AM CST   [facility] Lab Draw with  MASONIC LAB DRAW   [facility] Lab Draw ([facility])    [address]  [address]  [place] [place] [address] [phone]            Nov 20, 2018 10:30 AM CST   Return with Augusto Miller MD   [facility] Blood and Marrow Transplant ([facility])    [address]  [address]  [place] [address] [phone]              Who to contact     If you have questions or need follow up information about today's clinic visit or your schedule please contact [facility]YS[facility] AT [facility] directly at [phone].  Normal or non-critical lab and imaging results will be communicated to you by MyChart, letter or phone within 4 business days after the clinic has received the results. If you do not hear from us within 7 days, please contact the clinic through Pop[employer] or phone. If you have a critical or abnormal lab result, we will notify you by phone as soon as possible.  Submit refill requests through [employer] or call your pharmacy and they will forward the refill request to us. Please allow 3 business days for your refill to be completed.          Additional Information About Your Visit        MyChart Information     [employer] gives you secure access to your electronic health record. If you see a primary care provider, you can also send messages to your care team and make appointments. If you have questions, please call your primary care clinic.  If you do not have a primary care provider, please call  [phone] and they will assist you.        Care EveryWhere ID     This is your Care EveryWhere ID. This could be used by other organizations to access your [facility] medical records  [id number]        Your Vitals Were     Pulse Pulse Oximetry BMI (Body Mass Index)             133 95% 27.8 kg/m2          Blood Pressure from Last 3 Encounters:   09/14/18 118/85   07/03/18 112/60   05/22/18 131/81    Weight from Last 3 Encounters:   09/14/18 93 kg (205 lb)   07/03/18 94.2 kg (207 lb 9.6 oz)   05/22/18 95 kg (209 lb 6.4 oz)              Today, you had the following     No orders found for display         Today's Medication Changes          These changes are accurate as of 9/14/18  3:23 PM.  If you have any questions, ask your nurse or doctor.               Start taking these medicines.        Dose/Directions    metoprolol tartrate 25 MG tablet   Commonly known as:  LOPRESSOR   Used for:  Paroxysmal supraventricular tachycardia (H)   Started by:  Darcy Winters MD        Dose:  12.5 mg   Take 0.5 tablets (12.5 mg) by mouth every 6 hours as needed Take one half tab every 6 hours as needed for heart rates > 120 bpm.   Quantity:  30 tablet   Refills:  0            Where to get your medicines      These medications were sent to [facility]/pharmacy #[id number] - [place], [place] - [address] [address]     Phone:  [phone]     metoprolol tartrate 25 MG tablet                Primary Care Provider Office Phone # Fax #    Anya Nowak [phone]       [address] [address]        Equal Access to Services     [facility] AH: Hadii sabas Ann, bryant brown, qaybta kaalpriya sandra. So [facility] [phone].    ATENCIÓN: Si habla español, tiene a barger disposición servicios gratuitos de asistencia lingüística. Della al [phone].    We comply with applicable federal civil rights laws and [place]  laws. We do not discriminate on the basis of race, color, national origin, age, disability, sex, sexual orientation, or gender identity.            Thank you!     Thank you for choosing [facility] PHYSICIANS HEART AT [facility]  for your care. Our goal is always to provide you with excellent care. Hearing back from our patients is one way we can continue to improve our services. Please take a few minutes to complete the written survey that you may receive in the mail after your visit with us. Thank you!             Your Updated Medication List - Protect others around you: Learn how to safely use, store and throw away your medicines at www.disposemymeds.org.          This list is accurate as of 9/14/18  3:23 PM.  Always use your most recent med list.                   Brand Name Dispense Instructions for use Diagnosis    ARTIFICIAL TEAR SOLUTION OP      Apply  to eye. USE AS DIRECTED(DF)        CENTRUM PO      1 TABLET DAILY        folic acid 1 MG tablet    FOLVITE    90 tablet    Take 1 tablet (1,000 mcg) by mouth daily    Klrke-cepoen-qgtn disease, chronic (H), Lymphoma, unspecified body region, unspecified lymphoma type (H)       metoprolol tartrate 25 MG tablet    LOPRESSOR    30 tablet    Take 0.5 tablets (12.5 mg) by mouth every 6 hours as needed Take one half tab every 6 hours as needed for heart rates > 120 bpm.    Paroxysmal supraventricular tachycardia (H)       NYQUIL PO      Take by mouth as needed        pantoprazole 20 MG EC tablet    PROTONIX    90 tablet    Take 1 tablet (20 mg) by mouth At Bedtime    Wkpym-mefgxc-idxa disease, chronic (H), Hodgkin's disease of extranodal or solid organ site (H)       PREVIDENT 5000 DRY MOUTH 1.1 % Gel topical gel   Generic drug:  sodium fluoride dental gel      Take by mouth daily    Hodgkin's disease of extranodal or solid organ site (H), Status post bone marrow transplant (H), Hereditary hemochromatosis (H), Thyroid nodule       simvastatin 10 MG  tablet    ZOCOR    90 tablet    TAKE 1 TABLET (10 MG) BY MOUTH AT BEDTIME    Hyperlipidemia with target LDL less than 100       sotalol 80 MG tablet    BETAPACE    90 tablet    Take 0.5 tablets (40 mg) by mouth 2 times daily Follow up visit needed with Dr Matt    Paroxysmal atrial fibrillation (H)       warfarin 5 MG tablet    COUMADIN    45 tablet    Take 0.5 tablets (2.5 mg) by mouth daily or as directed by coumadin clinic    Atrial fibrillation, unspecified type (H), Antiphospholipid antibody syndrome (H)

## 2018-09-14 NOTE — PATIENT INSTRUCTIONS
Thank you for coming to the [facility] @ [place]; please note the following instructions:    1. Take metoprolol 12.5 mg by mouth (half tab) every 6 hours as needed for additional control if HR > 110 bpm persistently   2. Attempt vagal maneuvers (strain) as we practiced in clinic and monitor HR  3. Please follow up with EP clinic next week.  PRISCA Rodriguez   4. If HR stays >130-140 or severe symptoms develop, please come to [facility]        If you have any questions regarding your visit please contact your care team:     Cardiology  Telephone Number   Tiffany PÉREZ, RN  Nina COLON, RN   Mandi RAMIREZ, QASIM NEFF MA   [phone]    *After hours: [phone]   For scheduling appts:     [phone] or    [phone] (select option 1)    *After hours: [phone]     For the Device Clinic (Pacemakers and ICD's)  RN's :  Chelsey Carrera   During business hours: [phone]    *After business hours:  [phone] (select option 4)      Normal test result notifications will be released via [employer] or mailed within 7 business days.  All other test results, will be communicated via telephone once reviewed by your cardiologist.    If you need a medication refill please contact your pharmacy.  Please allow 3 business days for your refill to be completed.    As always, thank you for trusting us with your health care needs!

## 2018-09-14 NOTE — PROGRESS NOTES
Cardiology Consultation      HPI:     This is a 68 yr old male with following EP history here for an urgent visit with me:   s/p reisloation PVI and right atrial focal AT ablation  on 1/4/2018  for PAF and AT. History of DM2, antiphospholipid antibody syndrome, AT, PAF (CHADSVASC 5 on warfarin) s/p PVI 3/2013, HTN, CKD II, DVT/PE, Hodgkin's disease, s/p BMT, and primary pulmonary HTN.    From EP note: Diagnosed with AT in 2016. He was started on Metoprolol. He has also been on sotalol 40 mg BID. He followed up with Dr. Matt and decided to pursue ablation. On 1/4/2018, patient underwent a successful re-isolation of LPVs and ablation for RA focal AT. 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 Metoprolol was discontinued and sotalol was continued. Zio 3/9/2018 showed an episode sustained SVT.    He is here today with episode of high heart rates detected on his activity tracker to 140s-160s. Unable to break. In office today performed vagal maneuver which decreased rate to 110. He is having no syncope, but +palpitations. No SOB or chest pain.          PAST MEDICAL HISTORY  Past Medical History:   Diagnosis Date     Antiphospholipid antibody syndrome (H) 2005    Ravi's     Atrial fibrillation (H) 4/2011     Cirrhosis (H)      CKD (chronic kidney disease) stage 1, GFR 90 ml/min or greater      CKD (chronic kidney disease) stage 2, GFR 60-89 ml/min      Diabetes mellitus type II     steroid induced     DJD (degenerative joint disease) of knee     SHAWN, worse on right     DVT (deep venous thrombosis) (H)      ED (erectile dysfunction)      Gallbladder polyp 5/2010     Hxnku-Btqyjs-Asmf Disease 2007    BMT     Hemochromatosis      Hodgkin's disease NOS     5 cycles of ABVD in 2011     HTN (hypertension)      Hyperlipidaemia LDL goal <100       Myeloproliferative disorder (H)     atypical, [facility]     PE (pulmonary embolism) 11/2004     Polyneuropathy      Primary pulmonary hypertension (H)        CURRENT MEDICATIONS  Current Outpatient Prescriptions   Medication Sig Dispense Refill     ARTIFICIAL TEAR SOLUTION OP Apply  to eye. USE AS DIRECTED(DF)       CENTRUM OR 1 TABLET DAILY       folic acid (FOLVITE) 1 MG tablet Take 1 tablet (1,000 mcg) by mouth daily 90 tablet 3     pantoprazole (PROTONIX) 20 MG EC tablet Take 1 tablet (20 mg) by mouth At Bedtime 90 tablet 3     PREVIDENT 5000 DRY MOUTH 1.1 % GEL topical gel Take by mouth daily  3     simvastatin (ZOCOR) 10 MG tablet TAKE 1 TABLET (10 MG) BY MOUTH AT BEDTIME 90 tablet 3     sotalol (BETAPACE) 80 MG tablet Take 0.5 tablets (40 mg) by mouth 2 times daily Follow up visit needed with Dr Matt 90 tablet 3     warfarin (COUMADIN) 5 MG tablet Take 0.5 tablets (2.5 mg) by mouth daily or as directed by coumadin clinic 45 tablet 1     Pseudoeph-Doxylamine-DM-APAP (NYQUIL PO) Take by mouth as needed         PAST SURGICAL HISTORY:  Past Surgical History:   Procedure Laterality Date     ANESTHESIA CARDIOVERSION  4/21/2011    Procedure:ANESTHESIA CARDIOVERSION; Surgeon:GENERIC ANESTHESIA PROVIDER; Location:[facility]     ARTHROSCOPY KNEE RT/LT      LT     CATARACT IOL, RT/LT       COLONOSCOPY  10/16/2012    Procedure: COLONOSCOPY;  Colonoscopy, screening;  Surgeon: Reg Feliciano MD;  Location: [facility] OR     H ABLATION FOCAL AFIB  3/5/2013    PVI     H ABLATION FOCAL AFIB  01/01/2018     HC BONE MARROW/STEM TRANSPLANT ALLOGENIC  5/8/2007     INSERT PORT VASCULAR ACCESS       JOINT REPLACEMTN, KNEE RT/LT  3/28/12    SHAWN     VASECTOMY  1990       ALLERGIES     Allergies   Allergen Reactions     No Known Drug Allergy        FAMILY HISTORY  Family History   Problem Relation Age of Onset     Hypertension Mother      Cerebrovascular Disease Mother      Arrhythmia Brother      Arrhythmia Sister      Alzheimer Disease Father       Diabetes Paternal Aunt      GASTROINTESTINAL DISEASE Maternal Grandmother      colitis      Thyroid Disease Sister      Cancer No family hx of      C.A.D. No family hx of      Thyroid Cancer No family hx of          SOCIAL HISTORY  Social History     Social History     Marital status:      Spouse name: Angelica     Number of children: 2     Years of education: 21     Occupational History      [employer]     Social History Main Topics     Smoking status: Never Smoker     Smokeless tobacco: Never Used     Alcohol use No     Drug use: No     Sexual activity: Not Currently     Partners: Female     Other Topics Concern     Not on file     Social History Narrative       ROS:   Constitutional: No fever, chills, or sweats. No weight gain/loss   ENT: No visual disturbance, ear ache, epistaxis, sore throat  Allergies/Immunologic: Negative  Respiratory: No cough, hemoptysia  Cardiovascular: As per HPI  GI: No nausea, vomiting, hematemesis, melena, or hematochezia  : No urinary frequency, dysuria, or hematuria  Integument: Negative  Psychiatric: Negative  Neuro: Negative  Endocrinology: Negative   Musculoskeletal: Negative  Vascular: No walking impairment, claudication, ischemic rest pain or nonhealing wounds    EXAM:  /85 (BP Location: Right arm, Patient Position: Chair, Cuff Size: Adult Large)  Pulse 133  Wt 93 kg (205 lb)  SpO2 95%  BMI 27.8 kg/m2  In general, the patient is a pleasant male in no apparent distress.    HEENT: NC/AT.  PERRLA.  EOMI.  Sclerae white, not injected.  Nares clear.  Pharynx without erythema or exudate.  Dentition intact.    Neck: No adenopathy.  No thyromegaly. Carotids +2/2 bilaterally without bruits.  No jugular venous distension.   Heart: RRR. Normal S1, S2 splits physiologically. No murmur, rub, click, or gallop. The PMI is in the 5th ICS in the midclavicular line. There is no heave.    Lungs: CTA.  No ronchi, wheezes, rales.  No dullness to  percussion.   Abdomen: Soft, nontender, nondistended. No organomegaly. No AAA.  No bruits.   Extremities: No clubbing, cyanosis, or edema.  No wounds. No varicose veins signs of chronic venous insufficiency.   Vascular: No bruits are noted.    Labs:  LIPID RESULTS:  Lab Results   Component Value Date    CHOL 122 01/25/2018    HDL 31 (L) 01/25/2018    LDL 69 01/25/2018    TRIG 111 01/25/2018    CHOLHDLRATIO 4.1 11/16/2015    NHDL 91 01/25/2018       LIVER ENZYME RESULTS:  Lab Results   Component Value Date    AST 35 05/22/2018    ALT 24 05/22/2018       CBC RESULTS:  Lab Results   Component Value Date    WBC 8.1 05/22/2018    RBC 5.15 05/22/2018    HGB 17.3 05/22/2018    HCT 47.9 05/22/2018    MCV 93 05/22/2018    MCH 33.6 (H) 05/22/2018    MCHC 36.1 05/22/2018    RDW 12.0 05/22/2018     (L) 05/22/2018       BMP RESULTS:  Lab Results   Component Value Date     05/22/2018    POTASSIUM 4.3 05/22/2018    CHLORIDE 101 05/22/2018    CO2 24 05/22/2018    ANIONGAP 9 05/22/2018     (H) 05/22/2018    BUN 22 05/22/2018    CR 1.07 05/22/2018    GFRESTIMATED 68 05/22/2018    GFRESTBLACK 83 05/22/2018    GILBERT 8.5 05/22/2018        A1C RESULTS:  Lab Results   Component Value Date    A1C 6.4 (H) 01/25/2018         Assessment and Plan:     68 yr old with recurrent SVT.    1. Take metoprolol 12.5 mg by mouth (half tab) every 6 hours as needed for additional control if HR > 110 bpm persistently   2. Attempt vagal maneuvers (strain) as we practiced in clinic and monitor HR  3. Please follow up with EP clinic next week.  Dr. Matt made aware. Will see PRISCA Rodriguez.  4. If HR stays >130-140 or severe symptoms develop, please come to [facility] ER    Total time spent 45 minutes, of which >50% was spent in face-to-face patient evaluation, reviewing data with patient, and coordination of care.     Darcy Winters MD MSc  Division of Cardiology  [employer] [facility]

## 2018-09-14 NOTE — NURSING NOTE
Med Reconcile: Reviewed and verified all current medications with the patient. The updated medication list was printed and given to the patient.  New Medication: metoprolol 12.5 mg po q 6 hours as needed for heart rate > 130.  Patient was educated regarding newly prescribed medication, including discussion of  the indication, administration, side effects, and when to report to MD or RN. Patient demonstrated understanding of this information and agreed to call with further questions or concerns.  Return Appointment: Tuesday with PRISCA Rodriguez.  Patient given instructions regarding scheduling next clinic visit. Patient demonstrated understanding of this information and agreed to call with further questions or concerns.  Patient stated he understood all health information given and agreed to call with further questions or concerns.

## 2018-09-14 NOTE — LETTER
9/14/2018      RE: Haresh Rock  [address] [place] [address]       Dear Colleague,    Thank you for the opportunity to participate in the care of your patient, Haresh Rock, at the [facility]. Please see a copy of my visit note below.             Cardiology Consultation      HPI:     This is a 68 yr old male with following EP history here for an urgent visit with me:   s/p reisloation PVI and right atrial focal AT ablation  on 1/4/2018  for PAF and AT. History of DM2, antiphospholipid antibody syndrome, AT, PAF (CHADSVASC 5 on warfarin) s/p PVI 3/2013, HTN, CKD II, DVT/PE, Hodgkin's disease, s/p BMT, and primary pulmonary HTN.    From EP note: Diagnosed with AT in 2016. He was started on Metoprolol. He has also been on sotalol 40 mg BID. He followed up with Dr. Matt and decided to pursue ablation. On 1/4/2018, patient underwent a successful re-isolation of LPVs and ablation for RA focal AT. 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 Metoprolol was discontinued and sotalol was continued. Zio 3/9/2018 showed an episode sustained SVT.    He is here today with episode of high heart rates detected on his activity tracker to 140s-160s. Unable to break. In office today performed vagal maneuver which decreased rate to 110. He is having no syncope, but +palpitations. No SOB or chest pain.          PAST MEDICAL HISTORY  Past Medical History:   Diagnosis Date     Antiphospholipid antibody syndrome (H) 2005    Ravi's     Atrial fibrillation (H) 4/2011     Cirrhosis (H)      CKD (chronic kidney disease) stage 1, GFR 90 ml/min or greater      CKD (chronic kidney disease) stage 2, GFR 60-89 ml/min      Diabetes mellitus type II     steroid induced      DJD (degenerative joint disease) of knee     SHAWN, worse on right     DVT (deep venous thrombosis) (H)      ED (erectile dysfunction)      Gallbladder polyp 5/2010     Zyjiz-Czvumg-Bezc Disease 2007    BMT     Hemochromatosis      Hodgkin's disease NOS     5 cycles of ABVD in 2011     HTN (hypertension)      Hyperlipidaemia LDL goal <100      Myeloproliferative disorder (H)     atypical, [facility]     PE (pulmonary embolism) 11/2004     Polyneuropathy      Primary pulmonary hypertension (H)        CURRENT MEDICATIONS  Current Outpatient Prescriptions   Medication Sig Dispense Refill     ARTIFICIAL TEAR SOLUTION OP Apply  to eye. USE AS DIRECTED(DF)       CENTRUM OR 1 TABLET DAILY       folic acid (FOLVITE) 1 MG tablet Take 1 tablet (1,000 mcg) by mouth daily 90 tablet 3     pantoprazole (PROTONIX) 20 MG EC tablet Take 1 tablet (20 mg) by mouth At Bedtime 90 tablet 3     PREVIDENT 5000 DRY MOUTH 1.1 % GEL topical gel Take by mouth daily  3     simvastatin (ZOCOR) 10 MG tablet TAKE 1 TABLET (10 MG) BY MOUTH AT BEDTIME 90 tablet 3     sotalol (BETAPACE) 80 MG tablet Take 0.5 tablets (40 mg) by mouth 2 times daily Follow up visit needed with Dr Matt 90 tablet 3     warfarin (COUMADIN) 5 MG tablet Take 0.5 tablets (2.5 mg) by mouth daily or as directed by coumadin clinic 45 tablet 1     Pseudoeph-Doxylamine-DM-APAP (NYQUIL PO) Take by mouth as needed         PAST SURGICAL HISTORY:  Past Surgical History:   Procedure Laterality Date     ANESTHESIA CARDIOVERSION  4/21/2011    Procedure:ANESTHESIA CARDIOVERSION; Surgeon:GENERIC ANESTHESIA PROVIDER; Location:[facility]     ARTHROSCOPY KNEE RT/LT      LT     CATARACT IOL, RT/LT       COLONOSCOPY  10/16/2012    Procedure: COLONOSCOPY;  Colonoscopy, screening;  Surgeon: Reg Feliciano MD;  Location: [facility]     H ABLATION FOCAL AFIB  3/5/2013    PVI     H ABLATION FOCAL AFIB  01/01/2018     HC BONE MARROW/STEM TRANSPLANT ALLOGENIC  5/8/2007     INSERT PORT VASCULAR ACCESS        JOINT REPLACEMTN, KNEE RT/LT  3/28/12    SHAWN     VASECTOMY  1990       ALLERGIES     Allergies   Allergen Reactions     No Known Drug Allergy        FAMILY HISTORY  Family History   Problem Relation Age of Onset     Hypertension Mother      Cerebrovascular Disease Mother      Arrhythmia Brother      Arrhythmia Sister      Alzheimer Disease Father      Diabetes Paternal Aunt      GASTROINTESTINAL DISEASE Maternal Grandmother      colitis      Thyroid Disease Sister      Cancer No family hx of      C.A.D. No family hx of      Thyroid Cancer No family hx of          SOCIAL HISTORY  Social History     Social History     Marital status:      Spouse name: Angelica     Number of children: 2     Years of education: 21     Occupational History      [employer]     Social History Main Topics     Smoking status: Never Smoker     Smokeless tobacco: Never Used     Alcohol use No     Drug use: No     Sexual activity: Not Currently     Partners: Female     Other Topics Concern     Not on file     Social History Narrative       ROS:   Constitutional: No fever, chills, or sweats. No weight gain/loss   ENT: No visual disturbance, ear ache, epistaxis, sore throat  Allergies/Immunologic: Negative  Respiratory: No cough, hemoptysia  Cardiovascular: As per HPI  GI: No nausea, vomiting, hematemesis, melena, or hematochezia  : No urinary frequency, dysuria, or hematuria  Integument: Negative  Psychiatric: Negative  Neuro: Negative  Endocrinology: Negative   Musculoskeletal: Negative  Vascular: No walking impairment, claudication, ischemic rest pain or nonhealing wounds    EXAM:  /85 (BP Location: Right arm, Patient Position: Chair, Cuff Size: Adult Large)  Pulse 133  Wt 93 kg (205 lb)  SpO2 95%  BMI 27.8 kg/m2  In general, the patient is a pleasant male in no apparent distress.    HEENT: NC/AT.  PERRLA.  EOMI.  Sclerae white, not injected.  Nares clear.  Pharynx without erythema or exudate.   Dentition intact.    Neck: No adenopathy.  No thyromegaly. Carotids +2/2 bilaterally without bruits.  No jugular venous distension.   Heart: RRR. Normal S1, S2 splits physiologically. No murmur, rub, click, or gallop. The PMI is in the 5th ICS in the midclavicular line. There is no heave.    Lungs: CTA.  No ronchi, wheezes, rales.  No dullness to percussion.   Abdomen: Soft, nontender, nondistended. No organomegaly. No AAA.  No bruits.   Extremities: No clubbing, cyanosis, or edema.  No wounds. No varicose veins signs of chronic venous insufficiency.   Vascular: No bruits are noted.    Labs:  LIPID RESULTS:  Lab Results   Component Value Date    CHOL 122 01/25/2018    HDL 31 (L) 01/25/2018    LDL 69 01/25/2018    TRIG 111 01/25/2018    CHOLHDLRATIO 4.1 11/16/2015    NHDL 91 01/25/2018       LIVER ENZYME RESULTS:  Lab Results   Component Value Date    AST 35 05/22/2018    ALT 24 05/22/2018       CBC RESULTS:  Lab Results   Component Value Date    WBC 8.1 05/22/2018    RBC 5.15 05/22/2018    HGB 17.3 05/22/2018    HCT 47.9 05/22/2018    MCV 93 05/22/2018    MCH 33.6 (H) 05/22/2018    MCHC 36.1 05/22/2018    RDW 12.0 05/22/2018     (L) 05/22/2018       BMP RESULTS:  Lab Results   Component Value Date     05/22/2018    POTASSIUM 4.3 05/22/2018    CHLORIDE 101 05/22/2018    CO2 24 05/22/2018    ANIONGAP 9 05/22/2018     (H) 05/22/2018    BUN 22 05/22/2018    CR 1.07 05/22/2018    GFRESTIMATED 68 05/22/2018    GFRESTBLACK 83 05/22/2018    GILBERT 8.5 05/22/2018        A1C RESULTS:  Lab Results   Component Value Date    A1C 6.4 (H) 01/25/2018         Assessment and Plan:     68 yr old with recurrent SVT.    1. Take metoprolol 12.5 mg by mouth (half tab) every 6 hours as needed for additional control if HR > 110 bpm persistently   2. Attempt vagal maneuvers (strain) as we practiced in clinic and monitor HR  3. Please follow up with EP clinic next week.  Dr. Matt made aware. Will see Farrah Jain  NP-C.  4. If HR stays >130-140 or severe symptoms develop, please come to [facility] ER    Total time spent 45 minutes, of which >50% was spent in face-to-face patient evaluation, reviewing data with patient, and coordination of care.     Darcy Winters MD MSc  Division of Cardiology  [facility]

## 2018-09-14 NOTE — NURSING NOTE
Chief Complaint   Patient presents with     Palpitations     NEW patient. Pt reports episodes of increased heart rate, SOB on exertion.       Initial /85 (BP Location: Right arm, Patient Position: Chair, Cuff Size: Adult Large)  Pulse 133  Wt 93 kg (205 lb)  SpO2 95%  BMI 27.8 kg/m2 Estimated body mass index is 27.8 kg/(m^2) as calculated from the following:    Height as of 1/24/18: 1.829 m (6').    Weight as of this encounter: 93 kg (205 lb)..  BP completed using cuff size: large    EKG was done.  Annabelle Viera MA

## 2018-09-18 ENCOUNTER — OFFICE VISIT (OUTPATIENT)
Dept: CARDIOLOGY | Facility: CLINIC | Age: 68
End: 2018-09-18
Payer: COMMERCIAL

## 2018-09-18 VITALS
OXYGEN SATURATION: 98 % | SYSTOLIC BLOOD PRESSURE: 113 MMHG | BODY MASS INDEX: 27.8 KG/M2 | DIASTOLIC BLOOD PRESSURE: 77 MMHG | WEIGHT: 205 LBS | HEART RATE: 73 BPM

## 2018-09-18 DIAGNOSIS — I48.0 PAROXYSMAL ATRIAL FIBRILLATION (H): ICD-10-CM

## 2018-09-18 DIAGNOSIS — I47.10 PAROXYSMAL SUPRAVENTRICULAR TACHYCARDIA (H): Primary | ICD-10-CM

## 2018-09-18 PROCEDURE — 93000 ELECTROCARDIOGRAM COMPLETE: CPT | Performed by: NURSE PRACTITIONER

## 2018-09-18 PROCEDURE — 99214 OFFICE O/P EST MOD 30 MIN: CPT | Performed by: NURSE PRACTITIONER

## 2018-09-18 RX ORDER — SOTALOL HYDROCHLORIDE 80 MG/1
80 TABLET ORAL 2 TIMES DAILY
Qty: 60 TABLET | Refills: 11 | Status: SHIPPED | OUTPATIENT
Start: 2018-09-18 | End: 2018-10-22

## 2018-09-18 NOTE — NURSING NOTE
Chief Complaint   Patient presents with     RECHECK     Paroxysmal Supraventricular tachycardia,7/25/18 stress echo,9/14/18. Pt reports no cardiac symptoms.       Initial /77 (BP Location: Left arm, Patient Position: Chair, Cuff Size: Adult Large)  Pulse 73  Wt 93 kg (205 lb)  SpO2 98%  BMI 27.8 kg/m2 Estimated body mass index is 27.8 kg/(m^2) as calculated from the following:    Height as of 1/24/18: 1.829 m (6').    Weight as of this encounter: 93 kg (205 lb)..  BP completed using cuff size: large    EKG was done.  Annabelle Viera MA

## 2018-09-18 NOTE — MR AVS SNAPSHOT
After Visit Summary   9/18/2018    Haresh Rock    MRN: [id number]           Patient Information     Date Of Birth          1950        Visit Information        Provider Department      9/18/2018 3:15 PM Farrah Jain APRN CNP [facility] HEART AT [facility]        Today's Diagnoses     Paroxysmal supraventricular tachycardia (H)    -  1    Paroxysmal atrial fibrillation (H)          Care Instructions    Thank you for coming to the [facility] Heart @ [facility]; please note the following instructions:    1. Stop PRN metoprolol and increase to sotalol 80 mg twice daily.   2. Repeat EKG in 3 days and put on a 14 day ZIO at that time.   3. Follow up with Dr. Bekah Matt    If you have any questions regarding your visit please contact your care team:     Cardiology  Telephone Number   Tiffany PÉREZ, KOSTAS COLON, KOSTAS RAMIREZ, QASIM NEFF MA   [phone]    *After hours: [phone]   For scheduling appts:     [phone] or    [phone] (select option 1)    *After hours: [phone]       Normal test result notifications will be released via [employer] or mailed within 7 business days.  All other test results, will be communicated via telephone once reviewed by your cardiologist.    If you need a medication refill please contact your pharmacy.  Please allow 3 business days for your refill to be completed.    As always, thank you for trusting us with your health care needs!            Follow-ups after your visit        Additional Services     Follow-Up with Electrophysiologist                 Your next 10 appointments already scheduled     Sep 21, 2018 10:00 AM CDT   Nurse Only with FK ANCILLARY CARDS   [facility] HEART AT [facility] ([facility])    [address] [place] [address] 2nd Floor  [facility] [address]   [phone]            Oct 17, 2018 10:45 AM CDT   Anticoagulation Visit with NICOLAS ANTI COAG    [facility] ([facility])    [address]  [place] [address]   [phone]            Oct 22, 2018  3:00 PM CDT   Return Visit with Bekah Matt MD   [facility] PHYSICIANS HEART AT [facility] ([facility])    [address]  [place] [address]   [phone]            Nov 20, 2018 10:00 AM CST   [facility] Lab Draw with  MASONIC LAB DRAW   [facility] Mason[facility] Lab Draw ([facility])    [address]  [address]  [facility] [address] [phone]            Nov 20, 2018 10:30 AM CST   Return with Augusto Miller MD   [facility] Blood and Marrow Transplant ([facility])    [address]  [address]  [facility] [address] [phone]              Future tests that were ordered for you today     Open Future Orders        Priority Expected Expires Ordered    Follow-Up with Electrophysiologist Routine 10/22/2018 12/24/2018 9/18/2018    EKG 12-lead, tracing only (Future) Routine 9/21/2018 1/16/2019 9/18/2018    Ziopatch Holter Monitor - Adult Routine  11/17/2018 9/18/2018            Who to contact     If you have questions or need follow up information about today's clinic visit or your schedule please contact [facility] PHYSICIANS HEART AT [facility] directly at [phone].  Normal or non-critical lab and imaging results will be communicated to you by MyChart, letter or phone within 4 business days after the clinic has received the results. If you do not hear from us within 7 days, please contact the clinic through MyChart or phone. If you have a critical or abnormal lab result, we will notify you by phone as soon as possible.  Submit refill requests through OurStory or call your pharmacy and they will forward the refill request to us. Please allow 3 business days for your refill to be completed.          Additional Information About Your  Visit        SharePlow[employer]real Information     [employer] gives you secure access to your electronic health record. If you see a primary care provider, you can also send messages to your care team and make appointments. If you have questions, please call your primary care clinic.  If you do not have a primary care provider, please call [phone] and they will assist you.        Care EveryWhere ID     This is your Care EveryWhere ID. This could be used by other organizations to access your Register medical records  [id number]        Your Vitals Were     Pulse Pulse Oximetry BMI (Body Mass Index)             73 98% 27.8 kg/m2          Blood Pressure from Last 3 Encounters:   09/18/18 113/77   09/14/18 118/85   07/03/18 112/60    Weight from Last 3 Encounters:   09/18/18 93 kg (205 lb)   09/14/18 93 kg (205 lb)   07/03/18 94.2 kg (207 lb 9.6 oz)              We Performed the Following     EKG 12-lead complete w/read - Clinics          Today's Medication Changes          These changes are accurate as of 9/18/18  4:05 PM.  If you have any questions, ask your nurse or doctor.               These medicines have changed or have updated prescriptions.        Dose/Directions    sotalol 80 MG tablet   Commonly known as:  BETAPACE   This may have changed:    - how much to take  - additional instructions   Used for:  Paroxysmal atrial fibrillation (H)   Changed by:  Farrah Jain APRN CNP        Dose:  80 mg   Take 1 tablet (80 mg) by mouth 2 times daily   Quantity:  60 tablet   Refills:  11         Stop taking these medicines if you haven't already. Please contact your care team if you have questions.     metoprolol tartrate 25 MG tablet   Commonly known as:  LOPRESSOR   Stopped by:  Farrah Jain APRN CNP                Where to get your medicines      These medications were sent to [facility]/pharmacy #[id number] - [place], [place] - [address] [address]     Phone:  [phone]     sotalol  80 MG tablet                Primary Care Provider Office Phone # Fax #    Anya Nowak [phone] [address]  [facility] [address]        Equal Access to Services     JOSELINE MONGE : Luisa sabas escalante cesarioo Sobrookeali, waaxda luqadaha, qaybta kaalmada adehelen, priya martinez laRitaanila cunningham. So [facility] [phone].    ATENCIÓN: Si habla español, tiene a barger disposición servicios gratuitos de asistencia lingüística. Llame al [phone].    We comply with applicable federal civil rights laws and [place] laws. We do not discriminate on the basis of race, color, national origin, age, disability, sex, sexual orientation, or gender identity.            Thank you!     Thank you for choosing [facility] PHYSICIANS HEART AT [facility]  for your care. Our goal is always to provide you with excellent care. Hearing back from our patients is one way we can continue to improve our services. Please take a few minutes to complete the written survey that you may receive in the mail after your visit with us. Thank you!             Your Updated Medication List - Protect others around you: Learn how to safely use, store and throw away your medicines at www.disposemymeds.org.          This list is accurate as of 9/18/18  4:05 PM.  Always use your most recent med list.                   Brand Name Dispense Instructions for use Diagnosis    ARTIFICIAL TEAR SOLUTION OP      Apply  to eye. USE AS DIRECTED(DF)        CENTRUM PO      1 TABLET DAILY        folic acid 1 MG tablet    FOLVITE    90 tablet    Take 1 tablet (1,000 mcg) by mouth daily    Oensx-qmukpj-bcnu disease, chronic (H), Lymphoma, unspecified body region, unspecified lymphoma type (H)       NYQUIL PO      Take by mouth as needed        pantoprazole 20 MG EC tablet    PROTONIX    90 tablet    Take 1 tablet (20 mg) by mouth At Bedtime    Chqkv-mwbsfa-kbto disease, chronic (H), Hodgkin's disease of extranodal or solid  organ site (H)       PREVIDENT 5000 DRY MOUTH 1.1 % Gel topical gel   Generic drug:  sodium fluoride dental gel      Take by mouth daily    Hodgkin's disease of extranodal or solid organ site (H), Status post bone marrow transplant (H), Hereditary hemochromatosis (H), Thyroid nodule       simvastatin 10 MG tablet    ZOCOR    90 tablet    TAKE 1 TABLET (10 MG) BY MOUTH AT BEDTIME    Hyperlipidemia with target LDL less than 100       sotalol 80 MG tablet    BETAPACE    60 tablet    Take 1 tablet (80 mg) by mouth 2 times daily    Paroxysmal atrial fibrillation (H)       warfarin 5 MG tablet    COUMADIN    45 tablet    Take 0.5 tablets (2.5 mg) by mouth daily or as directed by coumadin clinic    Atrial fibrillation, unspecified type (H), Antiphospholipid antibody syndrome (H)

## 2018-09-18 NOTE — LETTER
9/18/2018    RE: Haresh Rock  [address] [place] [address]     Dear Colleague,    Thank you for the opportunity to participate in the care of your patient, Haresh oRck, at the [facility] HEART AT [facility]. Please see a copy of my visit note below.        Heart Care      HPI: Haresh Rock is a 68 year old male who presents for follow up of PAF and AT He has a past medical history significant for DM2, antiphospholipid antibody syndrome, AT s/p ablation for RA focal AT (1/2018), PAF (CHADSVASC 5 on warfarin) s/p PVI (3/2013) and reisolation PVI (1/2018), HTN, CKD II, DVT/PE, Hodgkin's disease, s/p BMT, and primary pulmonary HTN. On 1/4/2018, patient underwent a successful re-isolation of LPVs and ablation for RA focal AT. 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 Zio 3/9/2018 showed an episode sustained SVT.      Reviewed current interval: He is currently on sotalol 40 mg twice daily for heart rate and rhythm control. He was seen by Dr. Winters last Friday for increased episodes of racing heart and she added PRN metoprolol for HR >120 bpm. He is on warfarin for stroke prophylaxis. His recent kidney function labs are normal. Normal stress echocardiogram 7/25/2018 with resting EF 60-65% and no signs of ischemia. EKG from 9/14 is pending and I can not see tracings.  Presenting EKG personally reviewed tracings: NSR, Vent rate 68, VT interval 178 ms, QRS duration 78 ms, QTc 429 ms.    Current Interval history:   Patient states that he has had an increase in mild symptoms of fast heart rate and fast heart rates noted on his I-phone over the past couple of months, with episodes occurring 2-3/week and lasting 2-8 hours, no worsening or relieving factors  identified. Patient states that he rarely misses doses of medications.  Denies chest pain or pressure, dizziness, syncope, dyspnea at rest or with exertion, orthopnea, PND, abdominal edema, pedal edema, or claudication.  Denies easy bruising or bleeding, hematuria, hematochezia, and epistaxis. Denies signs/symptoms of stroke such as visual disturbance, difficulty speaking, facial drooping, confusion, problems with gait, or any new numbness or weakness.    Current Outpatient Prescriptions   Medication Sig Dispense Refill     ARTIFICIAL TEAR SOLUTION OP Apply  to eye. USE AS DIRECTED(DF)       CENTRUM OR 1 TABLET DAILY       folic acid (FOLVITE) 1 MG tablet Take 1 tablet (1,000 mcg) by mouth daily 90 tablet 3     metoprolol tartrate (LOPRESSOR) 25 MG tablet Take 0.5 tablets (12.5 mg) by mouth every 6 hours as needed Take one half tab every 6 hours as needed for heart rates > 120 bpm. 30 tablet 0     pantoprazole (PROTONIX) 20 MG EC tablet Take 1 tablet (20 mg) by mouth At Bedtime 90 tablet 3     PREVIDENT 5000 DRY MOUTH 1.1 % GEL topical gel Take by mouth daily  3     Pseudoeph-Doxylamine-DM-APAP (NYQUIL PO) Take by mouth as needed       simvastatin (ZOCOR) 10 MG tablet TAKE 1 TABLET (10 MG) BY MOUTH AT BEDTIME 90 tablet 3     sotalol (BETAPACE) 80 MG tablet Take 0.5 tablets (40 mg) by mouth 2 times daily Follow up visit needed with Dr Matt 90 tablet 3     warfarin (COUMADIN) 5 MG tablet Take 0.5 tablets (2.5 mg) by mouth daily or as directed by coumadin clinic 45 tablet 1       Past Medical History:   Diagnosis Date     Antiphospholipid antibody syndrome (H) 2005    Ravi's     Atrial fibrillation (H) 4/2011     Cirrhosis (H)      CKD (chronic kidney disease) stage 1, GFR 90 ml/min or greater      CKD (chronic kidney disease) stage 2, GFR 60-89 ml/min      Diabetes mellitus type II     steroid induced     DJD (degenerative joint disease) of knee     SHAWN, worse on right     DVT (deep venous thrombosis) (H)      ED  (erectile dysfunction)      Gallbladder polyp 5/2010     Poxtc-Czjnxz-Jwtg Disease 2007    BMT     Hemochromatosis      Hodgkin's disease NOS     5 cycles of ABVD in 2011     HTN (hypertension)      Hyperlipidaemia LDL goal <100      Myeloproliferative disorder (H)     atypical, [facility]     PE (pulmonary embolism) 11/2004     Polyneuropathy      Primary pulmonary hypertension (H)        Past Surgical History:   Procedure Laterality Date     ANESTHESIA CARDIOVERSION  4/21/2011    Procedure:ANESTHESIA CARDIOVERSION; Surgeon:GENERIC ANESTHESIA PROVIDER; Location:[facility] OR     ARTHROSCOPY KNEE RT/LT      LT     CATARACT IOL, RT/LT       COLONOSCOPY  10/16/2012    Procedure: COLONOSCOPY;  Colonoscopy, screening;  Surgeon: eRg Feliciano MD;  Location: [facility] OR     H ABLATION FOCAL AFIB  3/5/2013    PVI     H ABLATION FOCAL AFIB  01/01/2018     HC BONE MARROW/STEM TRANSPLANT ALLOGENIC  5/8/2007     INSERT PORT VASCULAR ACCESS       JOINT REPLACEMTN, KNEE RT/LT  3/28/12    SHAWN     VASECTOMY  1990       Family History   Problem Relation Age of Onset     Hypertension Mother      Cerebrovascular Disease Mother      Arrhythmia Brother      Arrhythmia Sister      Alzheimer Disease Father      Diabetes Paternal Aunt      GASTROINTESTINAL DISEASE Maternal Grandmother      colitis      Thyroid Disease Sister      Cancer No family hx of      C.A.D. No family hx of      Thyroid Cancer No family hx of        Social History   Substance Use Topics     Smoking status: Never Smoker     Smokeless tobacco: Never Used     Alcohol use No       Allergies   Allergen Reactions     No Known Drug Allergy          ROS:   A complete review of systems was performed and is negative except as noted in the HPI.     Physical Examination:  Vitals: /77 (BP Location: Left arm, Patient Position: Chair, Cuff Size: Adult Large)  Pulse 73  Wt 93 kg (205 lb)  SpO2 98%  BMI 27.8 kg/m2  BMI= Body mass index is 27.8 kg/(m^2).    GENERAL APPEARANCE: healthy,  alert, and no acute distress  HEENT: no icterus, no xanthelasmas, normal pupil size and reaction, no cyanosis.  NECK: no asymmetry, no cervical bruits, no JVD   RESPIRATORY: lungs clear to auscultation - no rales, rhonchi or wheezes, no use of accessory muscles, no retractions, respirations are unlabored, normal respiratory rate  CARDIOVASCULAR: regular rhythm, normal S1 with physiologic split S2, no S3 or S4 and no murmur, click or rub  GI:  no abdominal bruits, soft, non-tender  EXTREMITIES: no clubbing  NEURO: alert and oriented to person/place/time, normal speech, gait and affect  VASC: Radial and posterior tibialis pulses +2 and symmetric bilaterally. No cyanosis or edema.   SKIN: no ecchymoses, no rashes    Assessment and recommendations:    #1&2. Paroxysmal atrial fibrillation and atrial tachycardia: Discussed in detail with the patient management/treatment options for AF includin. Stroke Prophylaxis:  CHADSVASC=age+, Vasc(DVT/PE)++, DM+, HTN+  5, corresponding to a 6.7% annual stroke / systemic emolism event rate. indicating need for long term oral anticoagulation. He has been taking warfarin without bleeding problems. Continue warfarin.  2. Rate Control: see rate control.   3. Rhythm Control: Stop PRN metoprolol and increase to sotalol 80 mg twice daily. Repeat EKG in 3 days and a 14 day ZIO. Will follow up with Dr. Bekah Matt to discuss possible repeat ablation vs continued AAT.   4. Risk factor modifications: Keep healthy cholesterol levels, maintain BP control, maintain a healthy weight, and limit caffeine and alcohol.     FOLLOW UP: Follow up in 5 or 6 weeks with Dr. Bekah Matt or follow up sooner if needed for problems or symptoms.     Patient expresses understanding and agreement with the plan.    I appreciate the chance to help with Haresh PHAN Pranav's care. Please let me know if you have any questions or concerns.    Farrah POND, CNP

## 2018-09-18 NOTE — PATIENT INSTRUCTIONS
Thank you for coming to the [facility] Heart @ [place]; please note the following instructions:    1. Stop PRN metoprolol and increase to sotalol 80 mg twice daily.   2. Repeat EKG in 3 days and put on a 14 day ZIO at that time.   3. Follow up with Dr. Bekah Matt    If you have any questions regarding your visit please contact your care team:     Cardiology  Telephone Number   Tiffany PÉREZ, KOSTAS COLON, KOSTAS RAMIREZ, QASIM NEFF MA   [phone]    *After hours: [phone]   For scheduling appts:     [phone] or    [phone] (select option 1)    *After hours: [phone]       Normal test result notifications will be released via [employer] or mailed within 7 business days.  All other test results, will be communicated via telephone once reviewed by your cardiologist.    If you need a medication refill please contact your pharmacy.  Please allow 3 business days for your refill to be completed.    As always, thank you for trusting us with your health care needs!

## 2018-09-18 NOTE — PROGRESS NOTES
Heart Care      HPI: Haresh Rock is a 68 year old male who presents for follow up of PAF and AT He has a past medical history significant for DM2, antiphospholipid antibody syndrome, AT s/p ablation for RA focal AT (1/2018), PAF (CHADSVASC 5 on warfarin) s/p PVI (3/2013) and reisolation PVI (1/2018), HTN, CKD II, DVT/PE, Hodgkin's disease, s/p BMT, and primary pulmonary HTN. On 1/4/2018, patient underwent a successful re-isolation of LPVs and ablation for RA focal AT. 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 Zio 3/9/2018 showed an episode sustained SVT.      Reviewed current interval: He is currently on sotalol 40 mg twice daily for heart rate and rhythm control. He was seen by Dr. Winters last Friday for increased episodes of racing heart and she added PRN metoprolol for HR >120 bpm. He is on warfarin for stroke prophylaxis. His recent kidney function labs are normal. Normal stress echocardiogram 7/25/2018 with resting EF 60-65% and no signs of ischemia. EKG from 9/14 is pending and I can not see tracings.  Presenting EKG personally reviewed tracings: NSR, Vent rate 68, IA interval 178 ms, QRS duration 78 ms, QTc 429 ms.    Current Interval history:   Patient states that he has had an increase in mild symptoms of fast heart rate and fast heart rates noted on his I-phone over the past couple of months, with episodes occurring 2-3/week and lasting 2-8 hours, no worsening or relieving factors identified. Patient states that he rarely misses doses of medications.  Denies chest pain or pressure, dizziness, syncope, dyspnea at rest or with exertion, orthopnea, PND, abdominal edema, pedal edema, or claudication.  Denies easy bruising or bleeding, hematuria, hematochezia, and epistaxis. Denies signs/symptoms of stroke such as visual disturbance,  difficulty speaking, facial drooping, confusion, problems with gait, or any new numbness or weakness.    Current Outpatient Prescriptions   Medication Sig Dispense Refill     ARTIFICIAL TEAR SOLUTION OP Apply  to eye. USE AS DIRECTED(DF)       CENTRUM OR 1 TABLET DAILY       folic acid (FOLVITE) 1 MG tablet Take 1 tablet (1,000 mcg) by mouth daily 90 tablet 3     metoprolol tartrate (LOPRESSOR) 25 MG tablet Take 0.5 tablets (12.5 mg) by mouth every 6 hours as needed Take one half tab every 6 hours as needed for heart rates > 120 bpm. 30 tablet 0     pantoprazole (PROTONIX) 20 MG EC tablet Take 1 tablet (20 mg) by mouth At Bedtime 90 tablet 3     PREVIDENT 5000 DRY MOUTH 1.1 % GEL topical gel Take by mouth daily  3     Pseudoeph-Doxylamine-DM-APAP (NYQUIL PO) Take by mouth as needed       simvastatin (ZOCOR) 10 MG tablet TAKE 1 TABLET (10 MG) BY MOUTH AT BEDTIME 90 tablet 3     sotalol (BETAPACE) 80 MG tablet Take 0.5 tablets (40 mg) by mouth 2 times daily Follow up visit needed with Dr Matt 90 tablet 3     warfarin (COUMADIN) 5 MG tablet Take 0.5 tablets (2.5 mg) by mouth daily or as directed by coumadin clinic 45 tablet 1       Past Medical History:   Diagnosis Date     Antiphospholipid antibody syndrome (H) 2005    Ravi's     Atrial fibrillation (H) 4/2011     Cirrhosis (H)      CKD (chronic kidney disease) stage 1, GFR 90 ml/min or greater      CKD (chronic kidney disease) stage 2, GFR 60-89 ml/min      Diabetes mellitus type II     steroid induced     DJD (degenerative joint disease) of knee     SHAWN, worse on right     DVT (deep venous thrombosis) (H)      ED (erectile dysfunction)      Gallbladder polyp 5/2010     Jokof-Ekdwfe-Gyus Disease 2007    BMT     Hemochromatosis      Hodgkin's disease NOS     5 cycles of ABVD in 2011     HTN (hypertension)      Hyperlipidaemia LDL goal <100      Myeloproliferative disorder (H)     atypical, [facility]     PE (pulmonary embolism) 11/2004     Polyneuropathy      Primary  pulmonary hypertension (H)        Past Surgical History:   Procedure Laterality Date     ANESTHESIA CARDIOVERSION  4/21/2011    Procedure:ANESTHESIA CARDIOVERSION; Surgeon:GENERIC ANESTHESIA PROVIDER; Location:[facility] OR     ARTHROSCOPY KNEE RT/LT      LT     CATARACT IOL, RT/LT       COLONOSCOPY  10/16/2012    Procedure: COLONOSCOPY;  Colonoscopy, screening;  Surgeon: Reg Feliciano MD;  Location: [facility] OR     H ABLATION FOCAL AFIB  3/5/2013    PVI     H ABLATION FOCAL AFIB  01/01/2018     HC BONE MARROW/STEM TRANSPLANT ALLOGENIC  5/8/2007     INSERT PORT VASCULAR ACCESS       JOINT REPLACEMTN, KNEE RT/LT  3/28/12    SHAWN     VASECTOMY  1990       Family History   Problem Relation Age of Onset     Hypertension Mother      Cerebrovascular Disease Mother      Arrhythmia Brother      Arrhythmia Sister      Alzheimer Disease Father      Diabetes Paternal Aunt      GASTROINTESTINAL DISEASE Maternal Grandmother      colitis      Thyroid Disease Sister      Cancer No family hx of      C.A.D. No family hx of      Thyroid Cancer No family hx of        Social History   Substance Use Topics     Smoking status: Never Smoker     Smokeless tobacco: Never Used     Alcohol use No       Allergies   Allergen Reactions     No Known Drug Allergy          ROS:   A complete review of systems was performed and is negative except as noted in the HPI.     Physical Examination:  Vitals: /77 (BP Location: Left arm, Patient Position: Chair, Cuff Size: Adult Large)  Pulse 73  Wt 93 kg (205 lb)  SpO2 98%  BMI 27.8 kg/m2  BMI= Body mass index is 27.8 kg/(m^2).    GENERAL APPEARANCE: healthy, alert, and no acute distress  HEENT: no icterus, no xanthelasmas, normal pupil size and reaction, no cyanosis.  NECK: no asymmetry, no cervical bruits, no JVD   RESPIRATORY: lungs clear to auscultation - no rales, rhonchi or wheezes, no use of accessory muscles, no retractions, respirations are unlabored, normal respiratory rate  CARDIOVASCULAR: regular  rhythm, normal S1 with physiologic split S2, no S3 or S4 and no murmur, click or rub  GI:  no abdominal bruits, soft, non-tender  EXTREMITIES: no clubbing  NEURO: alert and oriented to person/place/time, normal speech, gait and affect  VASC: Radial and posterior tibialis pulses +2 and symmetric bilaterally. No cyanosis or edema.   SKIN: no ecchymoses, no rashes    Assessment and recommendations:    #1&2. Paroxysmal atrial fibrillation and atrial tachycardia: Discussed in detail with the patient management/treatment options for AF includin. Stroke Prophylaxis:  CHADSVASC=age+, Vasc(DVT/PE)++, DM+, HTN+  5, corresponding to a 6.7% annual stroke / systemic emolism event rate. indicating need for long term oral anticoagulation. He has been taking warfarin without bleeding problems. Continue warfarin.  2. Rate Control: see rate control.   3. Rhythm Control: Stop PRN metoprolol and increase to sotalol 80 mg twice daily. Repeat EKG in 3 days and a 14 day ZIO. Will follow up with Dr. Bekah Matt to discuss possible repeat ablation vs continued AAT.   4. Risk factor modifications: Keep healthy cholesterol levels, maintain BP control, maintain a healthy weight, and limit caffeine and alcohol.     FOLLOW UP: Follow up in 5 or 6 weeks with Dr. Bekah Matt or follow up sooner if needed for problems or symptoms.     Patient expresses understanding and agreement with the plan.    I appreciate the chance to help with Haresh EMILIE Altamiranochloé's care. Please let me know if you have any questions or concerns.    Farrah POND, CNP

## 2018-09-21 ENCOUNTER — ALLIED HEALTH/NURSE VISIT (OUTPATIENT)
Dept: CARDIOLOGY | Facility: CLINIC | Age: 68
End: 2018-09-21
Payer: COMMERCIAL

## 2018-09-21 DIAGNOSIS — I47.10 PAROXYSMAL SUPRAVENTRICULAR TACHYCARDIA (H): ICD-10-CM

## 2018-09-21 DIAGNOSIS — I48.0 PAROXYSMAL ATRIAL FIBRILLATION (H): ICD-10-CM

## 2018-09-21 PROCEDURE — 0298T ZZC EXT ECG > 48HR TO 21 DAY REVIEW AND INTERPRETATN: CPT | Performed by: INTERNAL MEDICINE

## 2018-09-21 PROCEDURE — 0296T ZIO PATCH HOLTER: CPT | Performed by: INTERNAL MEDICINE

## 2018-09-21 NOTE — PROGRESS NOTES
ZioXT applied to patient today. Instructions on use and removal given and mail back instructions discussed with patient. All questions answered. Zio Device registered with [employer] via internet.     Kevin De La Cruz L.P.N.  Ekg.test performed today per Provider order after patient Ekg. Education relayed to patient,then Ekg. Result handed to provider for review.  Kevin De La Cruz L.P.N.

## 2018-09-21 NOTE — NURSING NOTE
Ekg.test performed today per Provider order after patient Ekg. Education relayed to patient,then Ekg. Result handed to provider for review.  Kevin De La Cruz L.P.N.      14 days ZioXT applied to patient today. Instructions on use and removal given and mail back instructions discussed with patient. All questions answered. Zio Device registered with [employer] via internet.   Device number: [id number].    Kevin De La Cruz L.P.N.

## 2018-09-21 NOTE — MR AVS SNAPSHOT
After Visit Summary   9/21/2018    Haresh Rock    MRN: [id number]           Patient Information     Date Of Birth          1950        Visit Information        Provider Department      9/21/2018 10:00 AM FK ANCILLARY CARDS [facility] PHYSICIANS HEART AT [facility]        Today's Diagnoses     Paroxysmal supraventricular tachycardia (H)        Paroxysmal atrial fibrillation (H)          Care Instructions    Thank you for coming to the [facility] Heart @ [facility]; please note the following instructions:    1.  We have applied a holter (heart) monitor for you to wear for 14 days.  You may remove the heart monitor on 10/05/18 morning.  Please see the enclosed instructions for further information, as well as instructions for removal of the heart monitor and return mailing directions.  If you should have questions regarding your monitor, please call [employer]. at [phone].  The Cardiology Nurse will contact you with your results (please see result notification details at bottom of summary).  2. Ekg. Test today     *PLEASE DO NOT SHOWER OR INDUCE EXCESSIVE SWEATING IN THE FIRST 24 HOURS OF WEARING*            If you have any questions regarding your visit please contact your care team:     Cardiology  Telephone Number   Tiffany PÉREZ, RN  Nina COLON, RN   Mandi RAMIREZ, PRIMOA  Annabelle NEFF, MA  Kevin RUFFIN, N   [phone]    *After hours: [phone]   For scheduling appts:     [phone] or    [phone] (select option 1)    *After hours: [phone]     For the Device Clinic (Pacemakers and ICD's)  RN's :  Chelsey Carrera   During business hours: [phone]    *After business hours:  [phone] (select option 4)      Normal test result notifications will be released via Sendia or mailed within 7 business days.  All other test results, will be communicated via telephone once reviewed by your cardiologist.    If you need a medication refill  please contact your pharmacy.  Please allow 3 business days for your refill to be completed.    As always, thank you for trusting us with your health care needs!                Follow-ups after your visit        Your next 10 appointments already scheduled     Oct 17, 2018 10:45 AM CDT   Anticoagulation Visit with NICOLAS ANTI COAG   [facility] ([facility])    [address] [address]   [phone]            Oct 22, 2018  3:00 PM CDT   Return Visit with Bekah Matt MD   [facility] PHYSICIANS HEART AT [facility] ([facility])    [address]  [place] [address]   [phone]            Nov 20, 2018 10:00 AM CST   Masonic Lab Draw with  MASONIC LAB DRAW   [facility] Masonic Lab Draw ([facility])    [address]  [address]  [place] [address] [phone]            Nov 20, 2018 10:30 AM CST   Return with Augusto Miller MD   [facility] Blood and Marrow Transplant ([facility])    [address]  [address]  [place] [id number] [phone]              Who to contact     If you have questions or need follow up information about today's clinic visit or your schedule please contact [facility] PHYSICIANS HEART AT [facility] directly at [phone].  Normal or non-critical lab and imaging results will be communicated to you by MyChart, letter or phone within 4 business days after the clinic has received the results. If you do not hear from us within 7 days, please contact the clinic through MyChart or phone. If you have a critical or abnormal lab result, we will notify you by phone as soon as possible.  Submit refill requests through [employer] or call your pharmacy and they will forward the refill request to us. Please allow 3 business days for your refill to be completed.          Additional Information About Your Visit         Mytopia Information     Mytopia gives you secure access to your electronic health record. If you see a primary care provider, you can also send messages to your care team and make appointments. If you have questions, please call your primary care clinic.  If you do not have a primary care provider, please call [phone] and they will assist you.        Care EveryWhere ID     This is your Care EveryWhere ID. This could be used by other organizations to access your [facility] medical records  [id number]         Blood Pressure from Last 3 Encounters:   09/18/18 113/77   09/14/18 118/85   07/03/18 112/60    Weight from Last 3 Encounters:   09/18/18 93 kg (205 lb)   09/14/18 93 kg (205 lb)   07/03/18 94.2 kg (207 lb 9.6 oz)              We Performed the Following     [employer] Holter Monitor - Adult        Primary Care Provider Office Phone # Fax #    Anya Nowak [phone]       [address] [address]        Equal Access to Services     [facility]: Hadii aad ku hadasho Soomaali, waaxda luqadaha, qaybta kaalmada adeegyada, waxay idiin hayaan cindi kharamiguel angel baron . So [facility] [phone].    ATENCIÓN: Si habla español, tiene a barger disposición servicios gratuitos de asistencia lingüística. Llame al [phone].    We comply with applicable federal civil rights laws and [place] laws. We do not discriminate on the basis of race, color, national origin, age, disability, sex, sexual orientation, or gender identity.            Thank you!     Thank you for choosing [facility] PHYSICIANS HEART AT [facility]  for your care. Our goal is always to provide you with excellent care. Hearing back from our patients is one way we can continue to improve our services. Please take a few minutes to complete the written survey that you may receive in the mail after your visit with us. Thank you!             Your Updated Medication List - Protect others around you: Learn how to  safely use, store and throw away your medicines at www.disposemymeds.org.          This list is accurate as of 9/21/18 11:02 AM.  Always use your most recent med list.                   Brand Name Dispense Instructions for use Diagnosis    ARTIFICIAL TEAR SOLUTION OP      Apply  to eye. USE AS DIRECTED(DF)        CENTRUM PO      1 TABLET DAILY        folic acid 1 MG tablet    FOLVITE    90 tablet    Take 1 tablet (1,000 mcg) by mouth daily    Dlzkg-qknrcz-qhva disease, chronic (H), Lymphoma, unspecified body region, unspecified lymphoma type (H)       NYQUIL PO      Take by mouth as needed        pantoprazole 20 MG EC tablet    PROTONIX    90 tablet    Take 1 tablet (20 mg) by mouth At Bedtime    Qjirb-ydlbfd-yvfn disease, chronic (H), Hodgkin's disease of extranodal or solid organ site (H)       PREVIDENT 5000 DRY MOUTH 1.1 % Gel topical gel   Generic drug:  sodium fluoride dental gel      Take by mouth daily    Hodgkin's disease of extranodal or solid organ site (H), Status post bone marrow transplant (H), Hereditary hemochromatosis (H), Thyroid nodule       simvastatin 10 MG tablet    ZOCOR    90 tablet    TAKE 1 TABLET (10 MG) BY MOUTH AT BEDTIME    Hyperlipidemia with target LDL less than 100       sotalol 80 MG tablet    BETAPACE    60 tablet    Take 1 tablet (80 mg) by mouth 2 times daily    Paroxysmal atrial fibrillation (H)       warfarin 5 MG tablet    COUMADIN    45 tablet    Take 0.5 tablets (2.5 mg) by mouth daily or as directed by coumadin clinic    Atrial fibrillation, unspecified type (H), Antiphospholipid antibody syndrome (H)

## 2018-09-21 NOTE — PATIENT INSTRUCTIONS
Thank you for coming to the [facility] @ [facility]; please note the following instructions:    1.  We have applied a holter (heart) monitor for you to wear for 14 days.  You may remove the heart monitor on 10/05/18 morning.  Please see the enclosed instructions for further information, as well as instructions for removal of the heart monitor and return mailing directions.  If you should have questions regarding your monitor, please call [employer] at [phone].  The Cardiology Nurse will contact you with your results (please see result notification details at bottom of summary).  2. Ekg. Test today     *PLEASE DO NOT SHOWER OR INDUCE EXCESSIVE SWEATING IN THE FIRST 24 HOURS OF WEARING*            If you have any questions regarding your visit please contact your care team:     Cardiology  Telephone Number   Tiffany PÉREZ, RN  Nina COLON, RN   Mandi RAMIREZ, RMA  Annabelle NEFF, MA  Kevin RUFFIN, LPN   [phone]    *After hours: [phone]   For scheduling appts:     [phone] or    [phone] (select option 1)    *After hours: [phone]     For the Device Clinic (Pacemakers and ICD's)  RN's :  Chelsey Carrera   During business hours: [phone]    *After business hours:  [phone] (select option 4)      Normal test result notifications will be released via [employer] or mailed within 7 business days.  All other test results, will be communicated via telephone once reviewed by your cardiologist.    If you need a medication refill please contact your pharmacy.  Please allow 3 business days for your refill to be completed.    As always, thank you for trusting us with your health care needs!

## 2018-09-24 ENCOUNTER — TELEPHONE (OUTPATIENT)
Dept: FAMILY MEDICINE | Facility: CLINIC | Age: 68
End: 2018-09-24

## 2018-09-24 DIAGNOSIS — I48.91 ATRIAL FIBRILLATION, UNSPECIFIED TYPE (H): ICD-10-CM

## 2018-09-24 DIAGNOSIS — D68.61 ANTIPHOSPHOLIPID ANTIBODY SYNDROME (H): ICD-10-CM

## 2018-09-24 RX ORDER — WARFARIN SODIUM 5 MG/1
2.5 TABLET ORAL DAILY
Qty: 45 TABLET | Refills: 1 | Status: SHIPPED | OUTPATIENT
Start: 2018-09-24 | End: 2018-11-14

## 2018-09-24 NOTE — TELEPHONE ENCOUNTER
Pt called and left a detailed message on INR vm stating he needs a refill of his coumadin.   Called pt and left a message advising him to contact pharmacy as he should have refills on rx.  Pt called and left message stating that pharmacy said he had no further refills. Called pharmacy and they did not receive the script from 7/31/18. Resent rx. Pt notified.    Mayi Gagnon RN  [facility]

## 2018-09-25 ENCOUNTER — MYC MEDICAL ADVICE (OUTPATIENT)
Dept: CARDIOLOGY | Facility: CLINIC | Age: 68
End: 2018-09-25

## 2018-10-17 ENCOUNTER — ANTICOAGULATION THERAPY VISIT (OUTPATIENT)
Dept: NURSING | Facility: CLINIC | Age: 68
End: 2018-10-17
Payer: COMMERCIAL

## 2018-10-17 DIAGNOSIS — I48.0 PAROXYSMAL ATRIAL FIBRILLATION (H): ICD-10-CM

## 2018-10-17 LAB — INR POINT OF CARE: 3.5 (ref 0.86–1.14)

## 2018-10-17 PROCEDURE — 85610 PROTHROMBIN TIME: CPT | Mod: QW

## 2018-10-17 PROCEDURE — 36416 COLLJ CAPILLARY BLOOD SPEC: CPT

## 2018-10-17 PROCEDURE — 99207 ZZC NO CHARGE NURSE ONLY: CPT

## 2018-10-17 NOTE — MR AVS SNAPSHOT
Haresh Rock   10/17/2018 10:45 AM   Anticoagulation Therapy Visit    Description:  68 year old male   Provider:  TY ANTI COAG   Department:  Ty Nurse           INR as of 10/17/2018     Today's INR 3.5!      Anticoagulation Summary as of 10/17/2018     INR goal 2.0-3.0   Today's INR 3.5!   Full warfarin instructions 10/17: Hold; Otherwise 2.5 mg every day   Next INR check 10/31/2018    Indications   Long-term (current) use of anticoagulants [Z79.01] [Z79.01]  Atrial fibrillation (H) [I48.91]         Your next Anticoagulation Clinic appointment(s)     Oct 31, 2018 10:30 AM CDT   Anticoagulation Visit with TY ANTI LEONIDES   [facility] ([address] [address] [phone]              Contact Numbers     [facility]  Please call [phone] to cancel and/or reschedule your appointment   Please call [phone] with any problems or questions regarding your therapy.        October 2018 Details    Sun Mon Tue Wed Thu Fri Sat      1               2               3               4               5               6                 7               8               9               10               11               12               13                 14               15               16               17      Hold   See details      18      2.5 mg         19      2.5 mg         20      2.5 mg           21      2.5 mg         22      2.5 mg         23      2.5 mg         24      2.5 mg         25      2.5 mg         26      2.5 mg         27      2.5 mg           28      2.5 mg         29      2.5 mg         30      2.5 mg         31                Date Details   10/17 This INR check       Date of next INR:  10/31/2018         How to take your warfarin dose     To take:  2.5 mg Take 0.5 of a 5 mg tablet.    Hold Do not take your warfarin dose. See the Details table to the right for additional instructions.

## 2018-10-17 NOTE — PROGRESS NOTES
ANTICOAGULATION FOLLOW-UP CLINIC VISIT    Patient Name:  Haresh Rock  Date:  10/17/2018  Contact Type:  Face to Face    SUBJECTIVE:     Patient Findings     Positives No Problem Findings    Comments Bleeding Signs/Symptoms: NO  Thromboembolic Signs/Symptoms: NO     Medication Changes:  NO  Dietary Changes:  NO  Bacterial/Viral Infection: NO     Missed Coumadin Doses: NO  Other Concerns:  Patient states has a sore left arm.              OBJECTIVE    INR Protime   Date Value Ref Range Status   10/17/2018 3.5 (A) 0.86 - 1.14 Final     Factor 2 Assay   Date Value Ref Range Status   05/01/2007 58 (L) 60 - 140 % Final     Comment:     (Note)  CALLED TO TAMARA(INTERV. RADIOLOGY)[id number],        ASSESSMENT / PLAN  INR assessment SUPRA    Recheck INR In: 2 WEEKS    INR Location Clinic      Anticoagulation Summary as of 10/17/2018     INR goal 2.0-3.0   Today's INR 3.5!   Warfarin maintenance plan 2.5 mg (5 mg x 0.5) every day   Full warfarin instructions 10/17: Hold; Otherwise 2.5 mg every day   Weekly warfarin total 17.5 mg   Plan last modified Laverne Ferguson, RN (6/8/2018)   Next INR check 10/31/2018   Priority INR   Target end date Indefinite    Indications   Long-term (current) use of anticoagulants [Z79.01] [Z79.01]  Atrial fibrillation (H) [I48.91]         Anticoagulation Episode Summary     INR check location     Preferred lab     Send INR reminders to [facility]    Comments       Anticoagulation Care Providers     Provider Role Specialty Phone number    Anya Nowak MD [facility] [phone]            See the Encounter Report to view Anticoagulation Flowsheet and Dosing Calendar (Go to Encounters tab in chart review, and find the Anticoagulation Therapy Visit)    Dosage adjustment made based on physician directed care plan.    Laverne Ferguson RN

## 2018-10-22 ENCOUNTER — OFFICE VISIT (OUTPATIENT)
Dept: CARDIOLOGY | Facility: CLINIC | Age: 68
End: 2018-10-22
Payer: COMMERCIAL

## 2018-10-22 VITALS
OXYGEN SATURATION: 97 % | BODY MASS INDEX: 27.94 KG/M2 | WEIGHT: 206 LBS | DIASTOLIC BLOOD PRESSURE: 87 MMHG | SYSTOLIC BLOOD PRESSURE: 128 MMHG | HEART RATE: 67 BPM

## 2018-10-22 DIAGNOSIS — I47.10 PAROXYSMAL SUPRAVENTRICULAR TACHYCARDIA (H): ICD-10-CM

## 2018-10-22 DIAGNOSIS — I48.0 PAROXYSMAL ATRIAL FIBRILLATION (H): ICD-10-CM

## 2018-10-22 PROCEDURE — 99214 OFFICE O/P EST MOD 30 MIN: CPT | Performed by: INTERNAL MEDICINE

## 2018-10-22 RX ORDER — FLECAINIDE ACETATE 50 MG/1
50 TABLET ORAL 2 TIMES DAILY
Qty: 60 TABLET | Refills: 3 | Status: SHIPPED | OUTPATIENT
Start: 2018-10-22 | End: 2018-11-21

## 2018-10-22 RX ORDER — METOPROLOL TARTRATE 25 MG/1
25 TABLET, FILM COATED ORAL 2 TIMES DAILY
Qty: 60 TABLET | Refills: 5 | Status: SHIPPED | OUTPATIENT
Start: 2018-10-22 | End: 2018-11-21

## 2018-10-22 NOTE — LETTER
10/22/2018    RE: Hareshroger Rock  [address] [place] [address]       CARDIOLOGY CLINIC FOLLOW UP    HPI: See dictated note    PAST MEDICAL HISTORY:  Past Medical History:   Diagnosis Date     Antiphospholipid antibody syndrome (H) 2005    Ravi's     Atrial fibrillation (H) 4/2011     Cirrhosis (H)      CKD (chronic kidney disease) stage 1, GFR 90 ml/min or greater      CKD (chronic kidney disease) stage 2, GFR 60-89 ml/min      Diabetes mellitus type II     steroid induced     DJD (degenerative joint disease) of knee     SHAWN, worse on right     DVT (deep venous thrombosis) (H)      ED (erectile dysfunction)      Gallbladder polyp 5/2010     Hzeaa-Lbeeqd-Osse Disease 2007    BMT     Hemochromatosis      Hodgkin's disease NOS     5 cycles of ABVD in 2011     HTN (hypertension)      Hyperlipidaemia LDL goal <100      Myeloproliferative disorder (H)     atypical, [facility]     PE (pulmonary embolism) 11/2004     Polyneuropathy      Primary pulmonary hypertension (H)        CURRENT MEDICATIONS:  Current Outpatient Prescriptions   Medication Sig Dispense Refill     ARTIFICIAL TEAR SOLUTION OP Apply  to eye. USE AS DIRECTED(DF)       CENTRUM OR 1 TABLET DAILY       folic acid (FOLVITE) 1 MG tablet Take 1 tablet (1,000 mcg) by mouth daily 90 tablet 3     pantoprazole (PROTONIX) 20 MG EC tablet Take 1 tablet (20 mg) by mouth At Bedtime 90 tablet 3     PREVIDENT 5000 DRY MOUTH 1.1 % GEL topical gel Take by mouth daily  3     Pseudoeph-Doxylamine-DM-APAP (NYQUIL PO) Take by mouth as needed       simvastatin (ZOCOR) 10 MG tablet TAKE 1 TABLET (10 MG) BY MOUTH AT BEDTIME 90 tablet 3     sotalol (BETAPACE) 80 MG tablet Take 1 tablet (80 mg) by mouth 2 times daily 60 tablet 11     warfarin (COUMADIN) 5 MG tablet Take 0.5 tablets (2.5 mg) by mouth daily or as directed by coumadin clinic 45 tablet 1       PAST SURGICAL HISTORY:  Past Surgical History:   Procedure Laterality Date     ANESTHESIA CARDIOVERSION  4/21/2011     Procedure:ANESTHESIA CARDIOVERSION; Surgeon:GENERIC ANESTHESIA PROVIDER; Location:[facility]     ARTHROSCOPY KNEE RT/LT      LT     CATARACT IOL, RT/LT       COLONOSCOPY  10/16/2012    Procedure: COLONOSCOPY;  Colonoscopy, screening;  Surgeon: Reg Feliciano MD;  Location: [facility]     H ABLATION FOCAL AFIB  3/5/2013    PVI     H ABLATION FOCAL AFIB  01/01/2018     HC BONE MARROW/STEM TRANSPLANT ALLOGENIC  5/8/2007     INSERT PORT VASCULAR ACCESS       JOINT REPLACEMTN, KNEE RT/LT  3/28/12    SHAWN     VASECTOMY  1990       ALLERGIES  Allergies   Allergen Reactions     No Known Drug Allergy        FAMILY HX:  Family History   Problem Relation Age of Onset     Hypertension Mother      Cerebrovascular Disease Mother      Arrhythmia Brother      Arrhythmia Sister      Alzheimer Disease Father      Diabetes Paternal Aunt      GASTROINTESTINAL DISEASE Maternal Grandmother      colitis      Thyroid Disease Sister      Cancer No family hx of      C.A.D. No family hx of      Thyroid Cancer No family hx of        SOCIAL HX:  Social History     Social History     Marital status:      Spouse name: Angelica     Number of children: 2     Years of education: 21     Occupational History      [employer]     Social History Main Topics     Smoking status: Never Smoker     Smokeless tobacco: Never Used     Alcohol use No     Drug use: No     Sexual activity: Not Currently     Partners: Female     Other Topics Concern     Parent/Sibling W/ Cabg, Mi Or Angioplasty Before 65f 55m? No     Social History Narrative       ROS:  Constitutional: No fever, chills, or sweats. No weight gain/loss.   ENT: No visual disturbance, ear ache, epistaxis, sore throat.   Allergies/Immunologic: Negative.   Respiratory: No cough, hemoptysis.   Cardiovascular: As per HPI.   GI: No nausea, vomiting, hematemesis, melena, or hematochezia.   : No urinary frequency, dysuria, or hematuria.   Integument: Negative.   Psychiatric: Negative.    Neuro: Negative.   Endocrinology: Negative.   Musculoskeletal: No myalgia.    VITAL SIGNS:  /87 (BP Location: Right arm, Patient Position: Sitting, Cuff Size: Adult Large)  Pulse 67  Wt 93.4 kg (206 lb)  SpO2 97%  BMI 27.94 kg/m2  Body mass index is 27.94 kg/(m^2).  Wt Readings from Last 2 Encounters:   10/22/18 93.4 kg (206 lb)   09/18/18 93 kg (205 lb)       PHYSICAL EXAM  Haresh Rock is a 68 year old male in no acute distress.  HEENT: Unremarkable.  Neck: JVP normal.  Carotids +4/4 bilaterally without bruits.  Lungs: CTA.  Cor: RRR. Normal S1 and S2.  No murmur, rub, or gallop.  PMI in Lf 5th ICS.  Abd: Soft, nontender, nondistended.  NABS.  No pulsatile mass.  Extremities: No C/C/E.  Pulses +4/4 symmetric in upper and lower extremities.  Neuro: Grossly intact.    LABS    Lab Results   Component Value Date    WBC 8.1 05/22/2018     Lab Results   Component Value Date    RBC 5.15 05/22/2018     Lab Results   Component Value Date    HGB 17.3 05/22/2018     Lab Results   Component Value Date    HCT 47.9 05/22/2018     No components found for: MCT  Lab Results   Component Value Date    MCV 93 05/22/2018     Lab Results   Component Value Date    MCH 33.6 05/22/2018     Lab Results   Component Value Date    MCHC 36.1 05/22/2018     Lab Results   Component Value Date    RDW 12.0 05/22/2018     Lab Results   Component Value Date     05/22/2018      Recent Labs   Lab Test  05/22/18   1031  01/04/18   0640   NA  134  137   POTASSIUM  4.3  3.7   CHLORIDE  101  103   CO2  24  26   ANIONGAP  9  8   GLC  260*  128*   BUN  22  22   CR  1.07  1.08   GILBERT  8.5  8.4*     Recent Labs   Lab Test  01/25/18   0738  04/18/16   1659  11/16/15   1155  07/09/14   1511   CHOL  122  124  126  168   HDL  31*  27*  31*  29*   LDL  69  52  71  104   TRIG  111  220*  119  174*   CHOLHDLRATIO   --    --   4.1  5.8*        ASSESSMENT AND PLAN:    Visit Date:   10/22/2018      ELECTROPHYSIOLOGY SPECIALTY CONSULTATION        REASON FOR VISIT:  The patient comes back for followup of paroxysmal atrial fibrillation and atrial tachycardia.      HISTORY OF PRESENT ILLNESS:  The patient is a 68-year-old gentleman with a history of paroxysmal atrial fibrillation and atrial tachycardia.  He is status post circumferential pulmonary vein isolation for paroxysmal atrial fibrillation in 03/2013, which provided him with an arrhythmia period lasting for about 5 years.  Towards the end of 2017, he had recurrent atrial fibrillation and underwent re-isolation of pulmonary veins in 01/2018.  During that ablation, the patient was also found to have a right *** atrial tachycardia for which he underwent an ablation.  Of note, during that ablation an atypical atrial flutter was also noted which kept degenerating to atrial fibrillation and was not mappable.      Most recently he was seen by DEJAH Rodriguez, CNP, in 09/2018 and his sotalol dose of 40 mg twice daily was increased to 80 mg twice daily.  Despite the increase, a Zio Patch done in September and 10/2018 showed 55 episodes of atrial tachycardia, the longest one lasting for several hours.  The patient was not symptomatic for all of the episodes.  The patient has an Apple watch and keeps a log of heart rate episodes greater than 110 beats per minute.      Other than palpitations, patient denies any symptoms of exertional dyspnea, exertional angina, frequent lightheadedness, presyncope or syncope.      ASSESSMENT AND PLAN:   1.  Paroxysmal atrial fibrillation, status post circumferential pulmonary vein isolation in 2013 with repeat isolation in 01/2018 - no known recurrence since the second ablation.   2.  Paroxysmal atrial tachycardia - refractory to sotalol 80 mg twice daily.      I discussed extensively with the patient this complex situation.  I discussed the aims, risks, and alternatives to a repeat ablation versus increasing the dose of sotalol to 120 mg twice daily versus trying a  different antiarrhythmic medication such as flecainide.  After an extensive discussion, the patient decided to try flecainide.        We will stop sotalol today.  We will start metoprolol tartrate 25 mg twice daily today and next Monday we will start flecainide 50 mg twice daily.  Two weeks from today the patient will undergo an exercise treadmill test to rule out excessive prolonged QRS prolongation or exercise-induced ventricular tachycardia.      We will see patient again in heart rhythm clinic in 3-4 months and the patient will use the Apple watch to document response to flecainide.  All questions and concerns were addressed, and patient is happy with plan.  The plan has also been communicated to the patient's referring provider.         JOEL AMTA MD          D: 10/22/2018   T: 10/22/2018   MT: SHASHA    Name:     PAMELA GRANADOS   MRN:      [id number]        Account:      [id number]   :      1950           Visit Date:   10/22/2018    Document: [id number]

## 2018-10-22 NOTE — LETTER
10/22/2018    RE: Haresh Rock  [address] [place] [address]     CARDIOLOGY CLINIC FOLLOW UP    HPI:     PAST MEDICAL HISTORY:  Past Medical History:   Diagnosis Date     Antiphospholipid antibody syndrome (H) 2005    Ravi's     Atrial fibrillation (H) 4/2011     Cirrhosis (H)      CKD (chronic kidney disease) stage 1, GFR 90 ml/min or greater      CKD (chronic kidney disease) stage 2, GFR 60-89 ml/min      Diabetes mellitus type II     steroid induced     DJD (degenerative joint disease) of knee     SHAWN, worse on right     DVT (deep venous thrombosis) (H)      ED (erectile dysfunction)      Gallbladder polyp 5/2010     Unjrv-Eroiek-Zthk Disease 2007    BMT     Hemochromatosis      Hodgkin's disease NOS     5 cycles of ABVD in 2011     HTN (hypertension)      Hyperlipidaemia LDL goal <100      Myeloproliferative disorder (H)     atypical, [facility]     PE (pulmonary embolism) 11/2004     Polyneuropathy      Primary pulmonary hypertension (H)            CURRENT MEDICATIONS:  Current Outpatient Prescriptions   Medication Sig Dispense Refill     ARTIFICIAL TEAR SOLUTION OP Apply  to eye. USE AS DIRECTED(DF)       CENTRUM OR 1 TABLET DAILY       folic acid (FOLVITE) 1 MG tablet Take 1 tablet (1,000 mcg) by mouth daily 90 tablet 3     pantoprazole (PROTONIX) 20 MG EC tablet Take 1 tablet (20 mg) by mouth At Bedtime 90 tablet 3     PREVIDENT 5000 DRY MOUTH 1.1 % GEL topical gel Take by mouth daily  3     Pseudoeph-Doxylamine-DM-APAP (NYQUIL PO) Take by mouth as needed       simvastatin (ZOCOR) 10 MG tablet TAKE 1 TABLET (10 MG) BY MOUTH AT BEDTIME 90 tablet 3     sotalol (BETAPACE) 80 MG tablet Take 1 tablet (80 mg) by mouth 2 times daily 60 tablet 11     warfarin (COUMADIN) 5 MG tablet Take 0.5 tablets (2.5 mg) by mouth daily or as directed by coumadin clinic 45 tablet 1       PAST SURGICAL HISTORY:  Past Surgical History:   Procedure Laterality Date     ANESTHESIA CARDIOVERSION  4/21/2011     Procedure:ANESTHESIA CARDIOVERSION; Surgeon:GENERIC ANESTHESIA PROVIDER; Location:[facility]     ARTHROSCOPY KNEE RT/LT      LT     CATARACT IOL, RT/LT       COLONOSCOPY  10/16/2012    Procedure: COLONOSCOPY;  Colonoscopy, screening;  Surgeon: Reg Feliciano MD;  Location: [facility]     H ABLATION FOCAL AFIB  3/5/2013    PVI     H ABLATION FOCAL AFIB  01/01/2018     HC BONE MARROW/STEM TRANSPLANT ALLOGENIC  5/8/2007     INSERT PORT VASCULAR ACCESS       JOINT REPLACEMTN, KNEE RT/LT  3/28/12    SHAWN     VASECTOMY  1990       ALLERGIES  Allergies   Allergen Reactions     No Known Drug Allergy        FAMILY HX:  Family History   Problem Relation Age of Onset     Hypertension Mother      Cerebrovascular Disease Mother      Arrhythmia Brother      Arrhythmia Sister      Alzheimer Disease Father      Diabetes Paternal Aunt      GASTROINTESTINAL DISEASE Maternal Grandmother      colitis      Thyroid Disease Sister      Cancer No family hx of      C.A.D. No family hx of      Thyroid Cancer No family hx of        SOCIAL HX:  Social History     Social History     Marital status:      Spouse name: Angelica     Number of children: 2     Years of education: 21     Occupational History      [employer]     Social History Main Topics     Smoking status: Never Smoker     Smokeless tobacco: Never Used     Alcohol use No     Drug use: No     Sexual activity: Not Currently     Partners: Female     Other Topics Concern     Parent/Sibling W/ Cabg, Mi Or Angioplasty Before 65f 55m? No     Social History Narrative       ROS:  Constitutional: No fever, chills, or sweats. No weight gain/loss.   ENT: No visual disturbance, ear ache, epistaxis, sore throat.   Allergies/Immunologic: Negative.   Respiratory: No cough, hemoptysis.   Cardiovascular: As per HPI.   GI: No nausea, vomiting, hematemesis, melena, or hematochezia.   : No urinary frequency, dysuria, or hematuria.   Integument: Negative.   Psychiatric: Negative.    Neuro: Negative.   Endocrinology: Negative.   Musculoskeletal: No myalgia.      VITAL SIGNS:  /87 (BP Location: Right arm, Patient Position: Sitting, Cuff Size: Adult Large)  Pulse 67  Wt 93.4 kg (206 lb)  SpO2 97%  BMI 27.94 kg/m2  Body mass index is 27.94 kg/(m^2).  Wt Readings from Last 2 Encounters:   10/22/18 93.4 kg (206 lb)   09/18/18 93 kg (205 lb)       PHYSICAL EXAM  Haresh Rock is a 68 year old male in no acute distress.  HEENT: Unremarkable.  Neck: JVP normal.  Carotids +4/4 bilaterally without bruits.  Lungs: CTA.  Cor: RRR. Normal S1 and S2.  No murmur, rub, or gallop.  PMI in Lf 5th ICS.  Abd: Soft, nontender, nondistended.  NABS.  No pulsatile mass.  Extremities: No C/C/E.  Pulses +4/4 symmetric in upper and lower extremities.  Neuro: Grossly intact.    LABS    Lab Results   Component Value Date    WBC 8.1 05/22/2018     Lab Results   Component Value Date    RBC 5.15 05/22/2018     Lab Results   Component Value Date    HGB 17.3 05/22/2018     Lab Results   Component Value Date    HCT 47.9 05/22/2018     No components found for: MCT  Lab Results   Component Value Date    MCV 93 05/22/2018     Lab Results   Component Value Date    MCH 33.6 05/22/2018     Lab Results   Component Value Date    MCHC 36.1 05/22/2018     Lab Results   Component Value Date    RDW 12.0 05/22/2018     Lab Results   Component Value Date     05/22/2018      Recent Labs   Lab Test  05/22/18   1031  01/04/18   0640   NA  134  137   POTASSIUM  4.3  3.7   CHLORIDE  101  103   CO2  24  26   ANIONGAP  9  8   GLC  260*  128*   BUN  22  22   CR  1.07  1.08   GILBERT  8.5  8.4*     Recent Labs   Lab Test  01/25/18   0738  04/18/16   1659  11/16/15   1155  07/09/14   1511   CHOL  122  124  126  168   HDL  31*  27*  31*  29*   LDL  69  52  71  104   TRIG  111  220*  119  174*   CHOLHDLRATIO   --    --   4.1  5.8*        ASSESSMENT AND PLAN:    Visit Date:   10/22/2018      ELECTROPHYSIOLOGY SPECIALTY CONSULTATION        REASON FOR VISIT:  The patient comes back for followup of paroxysmal atrial fibrillation and atrial tachycardia.      HISTORY OF PRESENT ILLNESS:  The patient is a 68-year-old gentleman with a history of paroxysmal atrial fibrillation and atrial tachycardia.  He is status post circumferential pulmonary vein isolation for paroxysmal atrial fibrillation in 03/2013, which provided him with an arrhythmia period lasting for about 5 years.  Towards the end of 2017, he had recurrent atrial fibrillation and underwent re-isolation of pulmonary veins in 01/2018.  During that ablation, the patient was also found to have a right atrial tachycardia for which he underwent an ablation.  Of note, during that ablation an atypical atrial flutter was also noted which kept degenerating to atrial fibrillation and was not mappable.      Most recently he was seen by DEJAH Rodriguez, CNP, in 09/2018 and his sotalol dose of 40 mg twice daily was increased to 80 mg twice daily.  Despite the increase, a Zio Patch done in September and 10/2018 showed 55 episodes of atrial tachycardia, the longest one lasting for several hours.  The patient was not symptomatic for all of the episodes.  The patient has an Apple watch and keeps a log of heart rate episodes greater than 110 beats per minute.      Other than palpitations, patient denies any symptoms of exertional dyspnea, exertional angina, frequent lightheadedness, presyncope or syncope.      ASSESSMENT AND PLAN:   1.  Paroxysmal atrial fibrillation, status post circumferential pulmonary vein isolation in 2013 with repeat isolation in 01/2018 - no known recurrence since the second ablation.   2.  Paroxysmal atrial tachycardia - refractory to sotalol 80 mg twice daily.      I discussed extensively with the patient this complex situation.  I discussed the aims, risks, and alternatives to a repeat ablation versus increasing the dose of sotalol to 120 mg twice daily versus trying a different  antiarrhythmic medication such as flecainide.  After an extensive discussion, the patient decided to try flecainide.        We will stop sotalol today.  We will start metoprolol tartrate 25 mg twice daily today and next Monday we will start flecainide 50 mg twice daily.  Two weeks from today the patient will undergo an exercise treadmill test to rule out excessive prolonged QRS prolongation or exercise-induced ventricular tachycardia.      We will see patient again in heart rhythm clinic in 3-4 months and the patient will use the Apple watch to document response to flecainide.  All questions and concerns were addressed, and patient is happy with plan.  The plan has also been communicated to the patient's referring provider.         JOEL MATA MD       D: 10/22/2018   T: 10/22/2018   MT: SHASHA    Name:     PAMELA GRANADOS   MRN:      [id number]        Account:      [id number]   :      1950           Visit Date:   10/22/2018    Document: [id number]

## 2018-10-22 NOTE — NURSING NOTE
Cardiac Testing: Patient given instructions regarding  Stress test 1 week after starting flecainide Discussed purpose, preparation, procedure and when to expect results reported back to the patient. Patient demonstrated understanding of this information and agreed to call with further questions or concerns.  Med Reconcile: Reviewed and verified all current medications with the patient. The updated medication list was printed and given to the patient.  Return Appointment: Patient given instructions regarding scheduling next clinic visit. Patient demonstrated understanding of this information and agreed to call with further questions or concerns.   3-4 months  Medication Change: Patient was educated regarding prescribed medication change, including discussion of the indication, administration, side effects, and when to report to MD or RN. Patient demonstrated understanding of this information and agreed to call with further questions or concerns.  STOP sotalol, START ,metoprolol 25 mg BID and 1 week after starting metoprolol, start flecainide 50 mg BID  Patient stated he understood all health information given and agreed to call with further questions or concerns.  Tiffany Mckeon RN

## 2018-10-22 NOTE — NURSING NOTE
Chief Complaint   Patient presents with     Palpitations      Paroxysmal supraventricular tachycardia ,09/21/18 zio result- Per patient heart palpitations occationally       Initial /87 (BP Location: Right arm, Patient Position: Sitting, Cuff Size: Adult Large)  Pulse 67  Wt 93.4 kg (206 lb)  SpO2 97%  BMI 27.94 kg/m2 Estimated body mass index is 27.94 kg/(m^2) as calculated from the following:    Height as of 1/24/18: 1.829 m (6').    Weight as of this encounter: 93.4 kg (206 lb)..  BP completed using cuff size: large    Kevin De La Cruz L.P.N.

## 2018-10-22 NOTE — PROGRESS NOTES
Visit Date:   10/22/2018      ELECTROPHYSIOLOGY SPECIALTY CONSULTATION       REASON FOR VISIT:  The patient comes back for followup of paroxysmal atrial fibrillation and atrial tachycardia.      HISTORY OF PRESENT ILLNESS:  The patient is a 68-year-old gentleman with a history of paroxysmal atrial fibrillation and atrial tachycardia.  He is status post circumferential pulmonary vein isolation for paroxysmal atrial fibrillation in 03/2013, which provided him with an arrhythmia period lasting for about 5 years.  Towards the end of 2017, he had recurrent atrial fibrillation and underwent re-isolation of pulmonary veins in 01/2018.  During that ablation, the patient was also found to have a right atrial tachycardia for which he underwent an ablation.  Of note, during that ablation an atypical atrial flutter was also noted which kept degenerating to atrial fibrillation and was not mappable.      Most recently he was seen by DEJAH Rodriguez, CNP, in 09/2018 and his sotalol dose of 40 mg twice daily was increased to 80 mg twice daily.  Despite the increase, a Zio Patch done in September and 10/2018 showed 55 episodes of atrial tachycardia, the longest one lasting for several hours.  The patient was not symptomatic for all of the episodes.  The patient has an Apple watch and keeps a log of heart rate episodes greater than 110 beats per minute.      Other than palpitations, patient denies any symptoms of exertional dyspnea, exertional angina, frequent lightheadedness, presyncope or syncope.      ASSESSMENT AND PLAN:   1.  Paroxysmal atrial fibrillation, status post circumferential pulmonary vein isolation in 2013 with repeat isolation in 01/2018 - no known recurrence since the second ablation.   2.  Paroxysmal atrial tachycardia - refractory to sotalol 80 mg twice daily.      I discussed extensively with the patient this complex situation.  I discussed the aims, risks, and alternatives to a repeat ablation versus  increasing the dose of sotalol to 120 mg twice daily versus trying a different antiarrhythmic medication such as flecainide.  After an extensive discussion, the patient decided to try flecainide.        We will stop sotalol today.  We will start metoprolol tartrate 25 mg twice daily today and next Monday we will start flecainide 50 mg twice daily.  Two weeks from today the patient will undergo an exercise treadmill test to rule out excessive prolonged QRS prolongation or exercise-induced ventricular tachycardia.      We will see patient again in heart rhythm clinic in 3-4 months and the patient will use the Apple watch to document response to flecainide.  All questions and concerns were addressed, and patient is happy with plan.  The plan has also been communicated to the patient's referring provider.         JOEL MATA MD             D: 10/22/2018   T: 10/22/2018   MT: SHASHA      Name:     PAMELA GRANADOS   MRN:      [id number]        Account:      [id number]   :      1950           Visit Date:   10/22/2018      Document: [id number]

## 2018-10-22 NOTE — LETTER
10/22/2018      RE: Haresh Rock  [address] [place] [address]       Dear Colleague,    Thank you for the opportunity to participate in the care of your patient, Haresh Rock, at the [facility] PHYSICIANS HEART AT [facility] at [facility]. Please see a copy of my visit note below.    CARDIOLOGY CLINIC FOLLOW UP    HPI: See dictated note    PAST MEDICAL HISTORY:  Past Medical History:   Diagnosis Date     Antiphospholipid antibody syndrome (H) 2005    Ravi's     Atrial fibrillation (H) 4/2011     Cirrhosis (H)      CKD (chronic kidney disease) stage 1, GFR 90 ml/min or greater      CKD (chronic kidney disease) stage 2, GFR 60-89 ml/min      Diabetes mellitus type II     steroid induced     DJD (degenerative joint disease) of knee     SHAWN, worse on right     DVT (deep venous thrombosis) (H)      ED (erectile dysfunction)      Gallbladder polyp 5/2010     Rkqnj-Tuukne-Gqvi Disease 2007    BMT     Hemochromatosis      Hodgkin's disease NOS     5 cycles of ABVD in 2011     HTN (hypertension)      Hyperlipidaemia LDL goal <100      Myeloproliferative disorder (H)     atypical, [facility]     PE (pulmonary embolism) 11/2004     Polyneuropathy      Primary pulmonary hypertension (H)        CURRENT MEDICATIONS:  Current Outpatient Prescriptions   Medication Sig Dispense Refill     ARTIFICIAL TEAR SOLUTION OP Apply  to eye. USE AS DIRECTED(DF)       CENTRUM OR 1 TABLET DAILY       folic acid (FOLVITE) 1 MG tablet Take 1 tablet (1,000 mcg) by mouth daily 90 tablet 3     pantoprazole (PROTONIX) 20 MG EC tablet Take 1 tablet (20 mg) by mouth At Bedtime 90 tablet 3     PREVIDENT 5000 DRY MOUTH 1.1 % GEL topical gel Take by mouth daily  3     Pseudoeph-Doxylamine-DM-APAP (NYQUIL PO) Take by mouth as needed       simvastatin (ZOCOR) 10 MG tablet TAKE 1 TABLET (10 MG) BY MOUTH AT BEDTIME 90 tablet 3     sotalol (BETAPACE) 80 MG tablet Take 1 tablet (80 mg) by mouth 2  times daily 60 tablet 11     warfarin (COUMADIN) 5 MG tablet Take 0.5 tablets (2.5 mg) by mouth daily or as directed by coumadin clinic 45 tablet 1       PAST SURGICAL HISTORY:  Past Surgical History:   Procedure Laterality Date     ANESTHESIA CARDIOVERSION  4/21/2011    Procedure:ANESTHESIA CARDIOVERSION; Surgeon:GENERIC ANESTHESIA PROVIDER; Location:[facility] OR     ARTHROSCOPY KNEE RT/LT      LT     CATARACT IOL, RT/LT       COLONOSCOPY  10/16/2012    Procedure: COLONOSCOPY;  Colonoscopy, screening;  Surgeon: Reg Feliciano MD;  Location: [facility] OR     H ABLATION FOCAL AFIB  3/5/2013    PVI     H ABLATION FOCAL AFIB  01/01/2018     HC BONE MARROW/STEM TRANSPLANT ALLOGENIC  5/8/2007     INSERT PORT VASCULAR ACCESS       JOINT REPLACEMTN, KNEE RT/LT  3/28/12    SHAWN     VASECTOMY  1990       ALLERGIES  Allergies   Allergen Reactions     No Known Drug Allergy        FAMILY HX:  Family History   Problem Relation Age of Onset     Hypertension Mother      Cerebrovascular Disease Mother      Arrhythmia Brother      Arrhythmia Sister      Alzheimer Disease Father      Diabetes Paternal Aunt      GASTROINTESTINAL DISEASE Maternal Grandmother      colitis      Thyroid Disease Sister      Cancer No family hx of      C.A.D. No family hx of      Thyroid Cancer No family hx of        SOCIAL HX:  Social History     Social History     Marital status:      Spouse name: Angelica     Number of children: 2     Years of education: 21     Occupational History      [employer]     Social History Main Topics     Smoking status: Never Smoker     Smokeless tobacco: Never Used     Alcohol use No     Drug use: No     Sexual activity: Not Currently     Partners: Female     Other Topics Concern     Parent/Sibling W/ Cabg, Mi Or Angioplasty Before 65f 55m? No     Social History Narrative       ROS:  Constitutional: No fever, chills, or sweats. No weight gain/loss.   ENT: No visual disturbance, ear ache, epistaxis, sore  throat.   Allergies/Immunologic: Negative.   Respiratory: No cough, hemoptysis.   Cardiovascular: As per HPI.   GI: No nausea, vomiting, hematemesis, melena, or hematochezia.   : No urinary frequency, dysuria, or hematuria.   Integument: Negative.   Psychiatric: Negative.   Neuro: Negative.   Endocrinology: Negative.   Musculoskeletal: No myalgia.    VITAL SIGNS:  /87 (BP Location: Right arm, Patient Position: Sitting, Cuff Size: Adult Large)  Pulse 67  Wt 93.4 kg (206 lb)  SpO2 97%  BMI 27.94 kg/m2  Body mass index is 27.94 kg/(m^2).  Wt Readings from Last 2 Encounters:   10/22/18 93.4 kg (206 lb)   09/18/18 93 kg (205 lb)       PHYSICAL EXAM  Haresh Rock is a 68 year old male in no acute distress.  HEENT: Unremarkable.  Neck: JVP normal.  Carotids +4/4 bilaterally without bruits.  Lungs: CTA.  Cor: RRR. Normal S1 and S2.  No murmur, rub, or gallop.  PMI in Lf 5th ICS.  Abd: Soft, nontender, nondistended.  NABS.  No pulsatile mass.  Extremities: No C/C/E.  Pulses +4/4 symmetric in upper and lower extremities.  Neuro: Grossly intact.    LABS    Lab Results   Component Value Date    WBC 8.1 05/22/2018     Lab Results   Component Value Date    RBC 5.15 05/22/2018     Lab Results   Component Value Date    HGB 17.3 05/22/2018     Lab Results   Component Value Date    HCT 47.9 05/22/2018     No components found for: MCT  Lab Results   Component Value Date    MCV 93 05/22/2018     Lab Results   Component Value Date    MCH 33.6 05/22/2018     Lab Results   Component Value Date    MCHC 36.1 05/22/2018     Lab Results   Component Value Date    RDW 12.0 05/22/2018     Lab Results   Component Value Date     05/22/2018      Recent Labs   Lab Test  05/22/18   1031  01/04/18   0640   NA  134  137   POTASSIUM  4.3  3.7   CHLORIDE  101  103   CO2  24  26   ANIONGAP  9  8   GLC  260*  128*   BUN  22 22   CR  1.07  1.08   GILBERT  8.5  8.4*     Recent Labs   Lab Test  01/25/18   0738  04/18/16   1659  11/16/15    1155  07/09/14   1511   CHOL  122  124  126  168   HDL  31*  27*  31*  29*   LDL  69  52  71  104   TRIG  111  220*  119  174*   CHOLHDLRATIO   --    --   4.1  5.8*        ASSESSMENT AND PLAN:  See dictated note                Visit Date:   10/22/2018      ELECTROPHYSIOLOGY SPECIALTY CONSULTATION       REASON FOR VISIT:  The patient comes back for followup of paroxysmal atrial fibrillation and atrial tachycardia.      HISTORY OF PRESENT ILLNESS:  The patient is a 68-year-old gentleman with a history of paroxysmal atrial fibrillation and atrial tachycardia.  He is status post circumferential pulmonary vein isolation for paroxysmal atrial fibrillation in 03/2013, which provided him with an arrhythmia period lasting for about 5 years.  Towards the end of 2017, he had recurrent atrial fibrillation and underwent re-isolation of pulmonary veins in 01/2018.  During that ablation, the patient was also found to have a right *** atrial tachycardia for which he underwent an ablation.  Of note, during that ablation an atypical atrial flutter was also noted which kept degenerating to atrial fibrillation and was not mappable.      Most recently he was seen by DEJAH Rodriguez, CNP, in 09/2018 and his sotalol dose of 40 mg twice daily was increased to 80 mg twice daily.  Despite the increase, a Zio Patch done in September and 10/2018 showed 55 episodes of atrial tachycardia, the longest one lasting for several hours.  The patient was not symptomatic for all of the episodes.  The patient has an Apple watch and keeps a log of heart rate episodes greater than 110 beats per minute.      Other than palpitations, patient denies any symptoms of exertional dyspnea, exertional angina, frequent lightheadedness, presyncope or syncope.      ASSESSMENT AND PLAN:   1.  Paroxysmal atrial fibrillation, status post circumferential pulmonary vein isolation in 2013 with repeat isolation in 01/2018 - no known recurrence since the second  ablation.   2.  Paroxysmal atrial tachycardia - refractory to sotalol 80 mg twice daily.      I discussed extensively with the patient this complex situation.  I discussed the aims, risks, and alternatives to a repeat ablation versus increasing the dose of sotalol to 120 mg twice daily versus trying a different antiarrhythmic medication such as flecainide.  After an extensive discussion, the patient decided to try flecainide.        We will stop sotalol today.  We will start metoprolol tartrate 25 mg twice daily today and next Monday we will start flecainide 50 mg twice daily.  Two weeks from today the patient will undergo an exercise treadmill test to rule out excessive prolonged QRS prolongation or exercise-induced ventricular tachycardia.      We will see patient again in heart rhythm clinic in 3-4 months and the patient will use the Apple watch to document response to flecainide.  All questions and concerns were addressed, and patient is happy with plan.  The plan has also been communicated to the patient's referring provider.         JOEL MATA MD             D: 10/22/2018   T: 10/22/2018   MT: SHASHA      Name:     PAMELA GRANADOS   MRN:      [id number]        Account:      [id number]   :      1950           Visit Date:   10/22/2018      Document: [id number]

## 2018-10-22 NOTE — MR AVS SNAPSHOT
After Visit Summary   10/22/2018    Haresh Rock    MRN: [id number]           Patient Information     Date Of Birth          1950        Visit Information        Provider Department      10/22/2018 3:00 PM Bekah Matt MD [facility] PHYSICIANS HEART AT [facility]        Today's Diagnoses     Paroxysmal atrial fibrillation (H)        Paroxysmal supraventricular tachycardia (H)          Care Instructions    Thank you for coming to the [facility]; please note the following instructions:    1. STOP Sotalol.    2.  Start metoprolol 25 mg twice daily.  This prescription was sent to your preferred pharmacy.    3.  On Monday, October 29th please start flecainide 50 mg twice daily.  This prescription was sent to your preferred pharmacy.    4.  Treadmill stress test is scheduled for Tuesday, November 6th at 10:00 am.          If you have any questions regarding your visit please contact your care team:     Cardiology  Telephone Number   Tiffany PÉREZ, RN  Nina COLON, RN   Mandi RAMIREZ, QASIM NEFF, MA  Kevin RUFFIN, N   [phone]    *After hours: [phone]   For scheduling appts:     [phone] or    [phone] (select option 1)    *After hours: [phone]     For the Device Clinic (Pacemakers and ICD's)  RN's :  Chelsey Carrera   During business hours: [phone]    *After business hours:  [phone] (select option 4)      Normal test result notifications will be released via [employer] or mailed within 7 business days.  All other test results, will be communicated via telephone once reviewed by your cardiologist.    If you need a medication refill please contact your pharmacy.  Please allow 3 business days for your refill to be completed.    As always, thank you for trusting us with your health care needs!        Exercise Stress Test    An exercise stress test shows your heart's response to exercise. Your healthcare provider often gives you  this test to evaluate the blood flow to your heart, your exercise tolerance, your heart's pumping pattern at different levels of work, or the presence of a heart rhythm disturbance. The test records your heartbeat while you walk on a treadmill or ride a stationary bike. The test can be done in a hospital, a test center, or your healthcare provider's office. The test is also called a stress electrocardiogram (ECG/EKG). This test is only appropriate in those who have the ability to participate in exercise and have no contraindications such as risk of falls.   Before your test  Tips to being prepared before your test include the following:     Be sure to mention the medicines you take and ask if it s OK to take them before the test.    Avoid food and drinks containing caffeine, as instructed by the healthcare provider.     Don t eat, drink, smoke, or have any caffeine for 3 hours before the test.    Don't exercise immediately before the test.   Getting ready  Tips to getting ready for your test include the following:     Wear comfortable walking or running shoes.    Wear a shirt or a blouse that you can remove easily. You may be asked to remove your clothing from the waist up. Women may wear a gown.  During your test  Here is what to expect at the time of the test:     Electrodes (small pads) are placed on your upper body and a blood pressure cuff on your arm. These are used to monitor your heartbeat and blood pressure during and after the test.    You are shown how to use the treadmill or bike.    You are then asked to exercise for several minutes. Expect the exercise to be easy at first. It will slowly get harder, with an increase in speed and incline every few minutes. This happens to monitor your heart as it is forced to work harder.     Exercise as long as you can, or until you are asked to stop.  Be sure to tell your healthcare provider if you feel any of the following:    Chest, arm, or jaw pain or  discomfort    Severe shortness of breath    Fatigue    Dizziness    Leg cramps or soreness    Faintness    Palpitations  After the test  Here is what to expect after your test:     You can resume your normal activity, unless otherwise instructed.     The results are sent to your healthcare provider.    Be sure to keep your follow-up appointment to learn the results of this test and what they mean.  Report any symptoms  Be sure to tell your healthcare provider if you feel:    Chest, arm, or jaw discomfort    Severe shortness of breath    Fatigue    Dizziness    Leg cramps or soreness    Faintness    Palpitations      Your next appointment is: ____________________  Date Last Reviewed: 10/1/2016    [id number] [employer]. [address] [place] [address], [place] [address]. All rights reserved. This information is not intended as a substitute for professional medical care. Always follow your healthcare professional's instructions.                Follow-ups after your visit        Your next 10 appointments already scheduled     Oct 31, 2018 10:30 AM CDT   Anticoagulation Visit with FZ ANTI COAG   [facility] ([facility]    [address] [place] [id number] [phone]            Nov 06, 2018 10:00 AM CST   Cardiac Stress Test with FK STRESS RM   [facility] PHYSICIANS HEART AT [facility] ([facility])    [address] [place] [phone]   [phone]           1. Please bring or wear a comfortable two-piece outfit and walking shoes. 2. Stop eating 3 hours before the test. You may drink water or juice. 3. Stop all caffeine 12 hours before the test. This includes coffee, tea, soda pop, chocolate and certain medicines (such as Anacin and Excederin). Also avoid decaf coffee and tea, as these contain small amounts of caffeine. 4. No alcohol, smoking or use of other tobacco products for 12 hours before the test. 5. Refer to your  provider instructions to see if you need to stop any medications (such as beta-blockers or nitrates) for this test. 6. For patients with diabetes: - If you take insulin, call your diabetes care team. Ask if you should take a   dose the morning of your test. - If you take diabetes medicine by mouth, don't take it on the morning of your test. Bring it with you to take after the test. (If you have questions, call your diabetes care team) 7. When you arrive, please tell us if: -You have diabetes. -You have taken Viagra, Cialis or Levitra in the past 48 hours. 8. For any questions that cannot be answered, please contact the ordering physician            Nov 20, 2018 10:00 AM CST   [facility] Lab Draw with  MASONIC LAB DRAW   [facility] Mason[facility] Lab Draw ([facility])    [address] [place] [address]  [address] [place] [address]   [phone]            Nov 20, 2018 10:30 AM CST   Return with Augusto Miller MD   [facility] Blood and Marrow Transplant ([facility])    [address] [place] [address]  [address] [place] [address]   [phone]            Jan 28, 2019  1:30 PM CST   Return Visit with Bekah Matt MD   [facility] PHYSICIANS HEART AT [facility] ([facility])    [address] [place] [id number]   [phone]              Future tests that were ordered for you today     Open Future Orders        Priority Expected Expires Ordered    Treadmill Stress test Routine  12/6/2018 10/22/2018            Who to contact     If you have questions or need follow up information about today's clinic visit or your schedule please contact [facility] PHYSICIANS HEART AT [facility] directly at [phone].  Normal or non-critical lab and imaging results will be communicated to you by MyChart, letter or phone within 4 business days after the clinic has received the results. If you do not hear from us within 7 days,  please contact the clinic through [email or web address] or phone. If you have a critical or abnormal lab result, we will notify you by phone as soon as possible.  Submit refill requests through [email or web address] or call your pharmacy and they will forward the refill request to us. Please allow 3 business days for your refill to be completed.          Additional Information About Your Visit        adicate [email or web address] Information     [email or web address] gives you secure access to your electronic health record. If you see a primary care provider, you can also send messages to your care team and make appointments. If you have questions, please call your primary care clinic.  If you do not have a primary care provider, please call [phone] and they will assist you.        Care EveryWhere ID     This is your Care EveryWhere ID. This could be used by other organizations to access your [place] medical records  [id number]        Your Vitals Were     Pulse Pulse Oximetry BMI (Body Mass Index)             67 97% 27.94 kg/m2          Blood Pressure from Last 3 Encounters:   10/22/18 128/87   09/18/18 113/77   09/14/18 118/85    Weight from Last 3 Encounters:   10/22/18 93.4 kg (206 lb)   09/18/18 93 kg (205 lb)   09/14/18 93 kg (205 lb)              We Performed the Following     Follow-Up with Electrophysiologist          Today's Medication Changes          These changes are accurate as of 10/22/18  4:10 PM.  If you have any questions, ask your nurse or doctor.               Start taking these medicines.        Dose/Directions    flecainide 50 MG tablet   Commonly known as:  TAMBOCOR   Used for:  Paroxysmal atrial fibrillation (H), Paroxysmal supraventricular tachycardia (H)   Started by:  Bekah Matt MD        Dose:  50 mg   Take 1 tablet (50 mg) by mouth 2 times daily   Quantity:  60 tablet   Refills:  3       metoprolol tartrate 25 MG tablet   Commonly known as:  LOPRESSOR   Used for:  Paroxysmal atrial fibrillation (H), Paroxysmal supraventricular tachycardia  (H)   Started by:  Bekah Matt MD        Dose:  25 mg   Take 1 tablet (25 mg) by mouth 2 times daily   Quantity:  60 tablet   Refills:  5         Stop taking these medicines if you haven't already. Please contact your care team if you have questions.     sotalol 80 MG tablet   Commonly known as:  BETAPACE   Stopped by:  Bekah Matt MD                Where to get your medicines      These medications were sent to [facility]/pharmacy #[id number] - [place], [place] - [address] [facility]  [address] [facility] [address]     Phone:  [phone]     flecainide 50 MG tablet    metoprolol tartrate 25 MG tablet                Primary Care Provider Office Phone # Fax #    Anya Nowak [phone] [address] [facility] [address]        Equal Access to Services     [facility]: Hadii sabas escalante hadasho Soomaali, waaxda luqadaha, qaybta kaalmada adeegyada, priya baron . So [facility] [phone].    ATENCIÓN: Si habla español, tiene a barger disposición servicios gratuitos de asistencia lingüística. [facility] [phone].    We comply with applicable federal civil rights laws and [place] laws. We do not discriminate on the basis of race, color, national origin, age, disability, sex, sexual orientation, or gender identity.            Thank you!     Thank you for choosing [facility] PHYSICIANS HEART AT [facility]  for your care. Our goal is always to provide you with excellent care. Hearing back from our patients is one way we can continue to improve our services. Please take a few minutes to complete the written survey that you may receive in the mail after your visit with us. Thank you!             Your Updated Medication List - Protect others around you: Learn how to safely use, store and throw away your medicines at www.disposemymeds.org.          This list is accurate as of 10/22/18  4:10 PM.  Always use your most recent med list.                   Brand  Name Dispense Instructions for use Diagnosis    ARTIFICIAL TEAR SOLUTION OP      Apply  to eye. USE AS DIRECTED(DF)        CENTRUM PO      1 TABLET DAILY        flecainide 50 MG tablet    TAMBOCOR    60 tablet    Take 1 tablet (50 mg) by mouth 2 times daily    Paroxysmal atrial fibrillation (H), Paroxysmal supraventricular tachycardia (H)       folic acid 1 MG tablet    FOLVITE    90 tablet    Take 1 tablet (1,000 mcg) by mouth daily    Bewzb-teskdt-pxvf disease, chronic (H), Lymphoma, unspecified body region, unspecified lymphoma type (H)       metoprolol tartrate 25 MG tablet    LOPRESSOR    60 tablet    Take 1 tablet (25 mg) by mouth 2 times daily    Paroxysmal atrial fibrillation (H), Paroxysmal supraventricular tachycardia (H)       NYQUIL PO      Take by mouth as needed        pantoprazole 20 MG EC tablet    PROTONIX    90 tablet    Take 1 tablet (20 mg) by mouth At Bedtime    Uuuuh-dkgqno-xoun disease, chronic (H), Hodgkin's disease of extranodal or solid organ site (H)       PREVIDENT 5000 DRY MOUTH 1.1 % Gel topical gel   Generic drug:  sodium fluoride dental gel      Take by mouth daily    Hodgkin's disease of extranodal or solid organ site (H), Status post bone marrow transplant (H), Hereditary hemochromatosis (H), Thyroid nodule       simvastatin 10 MG tablet    ZOCOR    90 tablet    TAKE 1 TABLET (10 MG) BY MOUTH AT BEDTIME    Hyperlipidemia with target LDL less than 100       warfarin 5 MG tablet    COUMADIN    45 tablet    Take 0.5 tablets (2.5 mg) by mouth daily or as directed by coumadin clinic    Atrial fibrillation, unspecified type (H), Antiphospholipid antibody syndrome (H)

## 2018-10-22 NOTE — PROGRESS NOTES
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 USE AS DIRECTED(DF)       CENTRUM OR 1 TABLET DAILY       folic acid (FOLVITE) 1 MG tablet Take 1 tablet (1,000 mcg) by mouth daily 90 tablet 3     pantoprazole (PROTONIX) 20 MG EC tablet Take 1 tablet (20 mg) by mouth At Bedtime 90 tablet 3     PREVIDENT 5000 DRY MOUTH 1.1 % GEL topical gel Take by mouth daily  3     Pseudoeph-Doxylamine-DM-APAP (NYQUIL PO) Take by mouth as needed       simvastatin (ZOCOR) 10 MG tablet TAKE 1 TABLET (10 MG) BY MOUTH AT BEDTIME 90 tablet 3     sotalol (BETAPACE) 80 MG tablet Take 1 tablet (80 mg) by mouth 2 times daily 60 tablet 11     warfarin (COUMADIN) 5 MG tablet Take 0.5 tablets (2.5 mg) by mouth daily or as directed by coumadin clinic 45 tablet 1       PAST SURGICAL HISTORY:  Past Surgical History:   Procedure Laterality Date     ANESTHESIA CARDIOVERSION  4/21/2011    Procedure:ANESTHESIA CARDIOVERSION; Surgeon:GENERIC ANESTHESIA PROVIDER;  Location:[facility] OR     ARTHROSCOPY KNEE RT/LT      LT     CATARACT IOL, RT/LT       COLONOSCOPY  10/16/2012    Procedure: COLONOSCOPY;  Colonoscopy, screening;  Surgeon: Reg Feliciano MD;  Location: [facility] OR     H ABLATION FOCAL AFIB  3/5/2013    PVI     H ABLATION FOCAL AFIB  01/01/2018     HC BONE MARROW/STEM TRANSPLANT ALLOGENIC  5/8/2007     INSERT PORT VASCULAR ACCESS       JOINT REPLACEMTN, KNEE RT/LT  3/28/12    SHAWN     VASECTOMY  1990       ALLERGIES  Allergies   Allergen Reactions     No Known Drug Allergy        FAMILY HX:  Family History   Problem Relation Age of Onset     Hypertension Mother      Cerebrovascular Disease Mother      Arrhythmia Brother      Arrhythmia Sister      Alzheimer Disease Father      Diabetes Paternal Aunt      GASTROINTESTINAL DISEASE Maternal Grandmother      colitis      Thyroid Disease Sister      Cancer No family hx of      C.A.D. No family hx of      Thyroid Cancer No family hx of        SOCIAL HX:  Social History     Social History     Marital status:      Spouse name: Angelica     Number of children: 2     Years of education: 21     Occupational History      [employer]     Social History Main Topics     Smoking status: Never Smoker     Smokeless tobacco: Never Used     Alcohol use No     Drug use: No     Sexual activity: Not Currently     Partners: Female     Other Topics Concern     Parent/Sibling W/ Cabg, Mi Or Angioplasty Before 65f 55m? No     Social History Narrative       ROS:  Constitutional: No fever, chills, or sweats. No weight gain/loss.   ENT: No visual disturbance, ear ache, epistaxis, sore throat.   Allergies/Immunologic: Negative.   Respiratory: No cough, hemoptysis.   Cardiovascular: As per HPI.   GI: No nausea, vomiting, hematemesis, melena, or hematochezia.   : No urinary frequency, dysuria, or hematuria.   Integument: Negative.   Psychiatric: Negative.   Neuro: Negative.   Endocrinology: Negative.   Musculoskeletal: No  myalgia.    VITAL SIGNS:  /87 (BP Location: Right arm, Patient Position: Sitting, Cuff Size: Adult Large)  Pulse 67  Wt 93.4 kg (206 lb)  SpO2 97%  BMI 27.94 kg/m2  Body mass index is 27.94 kg/(m^2).  Wt Readings from Last 2 Encounters:   10/22/18 93.4 kg (206 lb)   09/18/18 93 kg (205 lb)       PHYSICAL EXAM  Haresh Rock is a 68 year old male in no acute distress.  HEENT: Unremarkable.  Neck: JVP normal.  Carotids +4/4 bilaterally without bruits.  Lungs: CTA.  Cor: RRR. Normal S1 and S2.  No murmur, rub, or gallop.  PMI in Lf 5th ICS.  Abd: Soft, nontender, nondistended.  NABS.  No pulsatile mass.  Extremities: No C/C/E.  Pulses +4/4 symmetric in upper and lower extremities.  Neuro: Grossly intact.    LABS    Lab Results   Component Value Date    WBC 8.1 05/22/2018     Lab Results   Component Value Date    RBC 5.15 05/22/2018     Lab Results   Component Value Date    HGB 17.3 05/22/2018     Lab Results   Component Value Date    HCT 47.9 05/22/2018     No components found for: MCT  Lab Results   Component Value Date    MCV 93 05/22/2018     Lab Results   Component Value Date    MCH 33.6 05/22/2018     Lab Results   Component Value Date    MCHC 36.1 05/22/2018     Lab Results   Component Value Date    RDW 12.0 05/22/2018     Lab Results   Component Value Date     05/22/2018      Recent Labs   Lab Test  05/22/18   1031  01/04/18   0640   NA  134  137   POTASSIUM  4.3  3.7   CHLORIDE  101  103   CO2  24  26   ANIONGAP  9  8   GLC  260*  128*   BUN  22  22   CR  1.07  1.08   GILBERT  8.5  8.4*     Recent Labs   Lab Test  01/25/18   0738  04/18/16   1659  11/16/15   1155  07/09/14   1511   CHOL  122  124  126  168   HDL  31*  27*  31*  29*   LDL  69  52  71  104   TRIG  111  220*  119  174*   CHOLHDLRATIO   --    --   4.1  5.8*        ASSESSMENT AND PLAN:  See dictated note

## 2018-10-22 NOTE — PATIENT INSTRUCTIONS
Thank you for coming to the [facility]; please note the following instructions:    1. STOP Sotalol.    2.  Start metoprolol 25 mg twice daily.  This prescription was sent to your preferred pharmacy.    3.  On Monday, October 29th please start flecainide 50 mg twice daily.  This prescription was sent to your preferred pharmacy.    4.  Treadmill stress test is scheduled for Tuesday, November 6th at 10:00 am.      If you have any questions regarding your visit please contact your care team:     Cardiology  Telephone Number   Tiffany PÉREZ, RN  Nina COLON, RN   Mandi RAMIREZ, QASIM NEFF, MA  Kevin RUFFIN, LPN   [phone]    *After hours: [phone]   For scheduling appts:     [phone] or    [phone] (select option 1)    *After hours: [phone]     For the Device Clinic (Pacemakers and ICD's)  RN's :  Chelsey Carrera   During business hours: [phone]    *After business hours:  [phone] (select option 4)      Normal test result notifications will be released via [employer] or mailed within 7 business days.  All other test results, will be communicated via telephone once reviewed by your cardiologist.    If you need a medication refill please contact your pharmacy.  Please allow 3 business days for your refill to be completed.    As always, thank you for trusting us with your health care needs!        Exercise Stress Test    An exercise stress test shows your heart's response to exercise. Your healthcare provider often gives you this test to evaluate the blood flow to your heart, your exercise tolerance, your heart's pumping pattern at different levels of work, or the presence of a heart rhythm disturbance. The test records your heartbeat while you walk on a treadmill or ride a stationary bike. The test can be done in a hospital, a test center, or your healthcare provider's office. The test is also called a stress electrocardiogram (ECG/EKG). This test is only appropriate  in those who have the ability to participate in exercise and have no contraindications such as risk of falls.   Before your test  Tips to being prepared before your test include the following:     Be sure to mention the medicines you take and ask if it s OK to take them before the test.    Avoid food and drinks containing caffeine, as instructed by the healthcare provider.     Don t eat, drink, smoke, or have any caffeine for 3 hours before the test.    Don't exercise immediately before the test.   Getting ready  Tips to getting ready for your test include the following:     Wear comfortable walking or running shoes.    Wear a shirt or a blouse that you can remove easily. You may be asked to remove your clothing from the waist up. Women may wear a gown.  During your test  Here is what to expect at the time of the test:     Electrodes (small pads) are placed on your upper body and a blood pressure cuff on your arm. These are used to monitor your heartbeat and blood pressure during and after the test.    You are shown how to use the treadmill or bike.    You are then asked to exercise for several minutes. Expect the exercise to be easy at first. It will slowly get harder, with an increase in speed and incline every few minutes. This happens to monitor your heart as it is forced to work harder.     Exercise as long as you can, or until you are asked to stop.  Be sure to tell your healthcare provider if you feel any of the following:    Chest, arm, or jaw pain or discomfort    Severe shortness of breath    Fatigue    Dizziness    Leg cramps or soreness    Faintness    Palpitations  After the test  Here is what to expect after your test:     You can resume your normal activity, unless otherwise instructed.     The results are sent to your healthcare provider.    Be sure to keep your follow-up appointment to learn the results of this test and what they mean.  Report any symptoms  Be sure to tell your healthcare provider  if you feel:    Chest, arm, or jaw discomfort    Severe shortness of breath    Fatigue    Dizziness    Leg cramps or soreness    Faintness    Palpitations      Your next appointment is: ____________________  Date Last Reviewed: 10/1/2016    [id number] [employer]. [address] [place] [address], [place] [address]. All rights reserved. This information is not intended as a substitute for professional medical care. Always follow your healthcare professional's instructions.

## 2018-10-31 ENCOUNTER — ANTICOAGULATION THERAPY VISIT (OUTPATIENT)
Dept: NURSING | Facility: CLINIC | Age: 68
End: 2018-10-31
Payer: COMMERCIAL

## 2018-10-31 ENCOUNTER — TRANSFERRED RECORDS (OUTPATIENT)
Dept: HEALTH INFORMATION MANAGEMENT | Facility: CLINIC | Age: 68
End: 2018-10-31

## 2018-10-31 DIAGNOSIS — I48.0 PAROXYSMAL ATRIAL FIBRILLATION (H): ICD-10-CM

## 2018-10-31 LAB — INR POINT OF CARE: 3.3 (ref 0.86–1.14)

## 2018-10-31 PROCEDURE — 36416 COLLJ CAPILLARY BLOOD SPEC: CPT

## 2018-10-31 PROCEDURE — 85610 PROTHROMBIN TIME: CPT | Mod: QW

## 2018-10-31 PROCEDURE — 99207 ZZC NO CHARGE NURSE ONLY: CPT

## 2018-10-31 NOTE — MR AVS SNAPSHOT
Haresh Rock   10/31/2018 10:30 AM   Anticoagulation Therapy Visit    Description:  68 year old male   Provider:  TY ANTI COAG   Department:  [facility]           INR as of 10/31/2018     Today's INR 3.3!      Anticoagulation Summary as of 10/31/2018     INR goal 2.0-3.0   Today's INR 3.3!   Full warfarin instructions 2.5 mg every day   Next INR check 11/14/2018    Indications   Long-term (current) use of anticoagulants [Z79.01] [Z79.01]  Atrial fibrillation (H) [I48.91]         Instructions    Some signs and symptoms of bleeding include: Nose bleed or cut that does not stop bleeding in 10 minutes, bleeding of the gums, vomiting (will look like coffee grounds) or coughing up blood, unusual, easy or large areas of bruising, increased or unexpected vaginal bleeding or increased menstrual flow, red or black stools, red or orange urine, prolonged or severe headache, pale skin, unusual or constant tiredness.  If you have these please call 911 or seek medical care immediately.            Your next Anticoagulation Clinic appointment(s)     Nov 14, 2018 11:00 AM CST   Anticoagulation Visit with TY ANTI LEONIDES   [facility]    [address] [address] [phone]              Contact Numbers     [facility]  Please call [phone] to cancel and/or reschedule your appointment   Please call [phone] with any problems or questions regarding your therapy.        October 2018 Details    Sun Mon Tue Wed Thu Fri Sat      1               2               3               4               5               6                 7               8               9               10               11               12               13                 14               15               16               17               18               19               20                 21               22               23               24               25               26               27                  28               29               30               31      2.5 mg   See details          Date Details   10/31 This INR check               How to take your warfarin dose     To take:  2.5 mg Take 0.5 of a 5 mg tablet.           November 2018 Details    Sun Mon Tue Wed Thu Fri Sat         1      2.5 mg         2      2.5 mg         3      2.5 mg           4      2.5 mg         5      2.5 mg         6      2.5 mg         7      2.5 mg         8      2.5 mg         9      2.5 mg         10      2.5 mg           11      2.5 mg         12      2.5 mg         13      2.5 mg         14            15               16               17                 18               19               20               21               22               23               24                 25               26               27               28               29               30                 Date Details   No additional details    Date of next INR:  11/14/2018         How to take your warfarin dose     To take:  2.5 mg Take 0.5 of a 5 mg tablet.

## 2018-10-31 NOTE — PATIENT INSTRUCTIONS
Some signs and symptoms of bleeding include: Nose bleed or cut that does not stop bleeding in 10 minutes, bleeding of the gums, vomiting (will look like coffee grounds) or coughing up blood, unusual, easy or large areas of bruising, increased or unexpected vaginal bleeding or increased menstrual flow, red or black stools, red or orange urine, prolonged or severe headache, pale skin, unusual or constant tiredness.  If you have these please call 911 or seek medical care immediately.

## 2018-10-31 NOTE — PROGRESS NOTES
ANTICOAGULATION FOLLOW-UP CLINIC VISIT    Patient Name:  Haresh Rock  Date:  10/31/2018  Contact Type:  Face to Face    SUBJECTIVE:     Patient Findings     Positives No Problem Findings    Comments Bleeding Signs/Symptoms: NO  Thromboembolic Signs/Symptoms: NO     Medication Changes:  Started taking metoprolol and flecainide. Taking Nyquil for his cold. Stopped taking Sotalol  Dietary Changes:  NO  Bacterial/Viral Infection: Patient is complaining of sinus congestion      Missed Coumadin Doses: NO  Other Concerns:  NO             OBJECTIVE    INR Protime   Date Value Ref Range Status   10/31/2018 3.3 (A) 0.86 - 1.14 Final     Factor 2 Assay   Date Value Ref Range Status   05/01/2007 58 (L) 60 - 140 % Final     Comment:     (Note)  CALLED TO TAMARA(INTERV. RADIOLOGY)[id number],        ASSESSMENT / PLAN  INR assessment SUPRA    Recheck INR In: 2 WEEKS    INR Location Clinic      Anticoagulation Summary as of 10/31/2018     INR goal 2.0-3.0   Today's INR 3.3!   Warfarin maintenance plan 2.5 mg (5 mg x 0.5) every day   Full warfarin instructions 2.5 mg every day   Weekly warfarin total 17.5 mg   No change documented Laverne Ferguson RN   Plan last modified Laverne Ferguson RN (6/8/2018)   Next INR check 11/14/2018   Priority INR   Target end date Indefinite    Indications   Long-term (current) use of anticoagulants [Z79.01] [Z79.01]  Atrial fibrillation (H) [I48.91]         Anticoagulation Episode Summary     INR check location     Preferred lab     Send INR reminders to [facility]    Comments       Anticoagulation Care Providers     Provider Role Specialty Phone number    Anya Nowak MD [facility] [phone]            See the Encounter Report to view Anticoagulation Flowsheet and Dosing Calendar (Go to Encounters tab in chart review, and find the Anticoagulation Therapy Visit)    Dosage adjustment made based on physician directed care plan.    Laverne Ferguson RN

## 2018-11-06 DIAGNOSIS — I47.10 PAROXYSMAL SUPRAVENTRICULAR TACHYCARDIA (H): ICD-10-CM

## 2018-11-06 DIAGNOSIS — I48.0 PAROXYSMAL ATRIAL FIBRILLATION (H): ICD-10-CM

## 2018-11-06 PROCEDURE — 93017 CV STRESS TEST TRACING ONLY: CPT | Performed by: INTERNAL MEDICINE

## 2018-11-06 PROCEDURE — 93018 CV STRESS TEST I&R ONLY: CPT | Mod: GC | Performed by: INTERNAL MEDICINE

## 2018-11-06 PROCEDURE — 93016 CV STRESS TEST SUPVJ ONLY: CPT | Performed by: INTERNAL MEDICINE

## 2018-11-14 ENCOUNTER — ANTICOAGULATION THERAPY VISIT (OUTPATIENT)
Dept: NURSING | Facility: CLINIC | Age: 68
End: 2018-11-14
Payer: COMMERCIAL

## 2018-11-14 DIAGNOSIS — I48.0 PAROXYSMAL ATRIAL FIBRILLATION (H): ICD-10-CM

## 2018-11-14 DIAGNOSIS — D68.61 ANTIPHOSPHOLIPID ANTIBODY SYNDROME (H): ICD-10-CM

## 2018-11-14 DIAGNOSIS — I48.91 ATRIAL FIBRILLATION, UNSPECIFIED TYPE (H): ICD-10-CM

## 2018-11-14 LAB — INR POINT OF CARE: 3.7 (ref 0.86–1.14)

## 2018-11-14 PROCEDURE — 99207 ZZC NO CHARGE NURSE ONLY: CPT

## 2018-11-14 PROCEDURE — 36416 COLLJ CAPILLARY BLOOD SPEC: CPT

## 2018-11-14 PROCEDURE — 85610 PROTHROMBIN TIME: CPT | Mod: QW

## 2018-11-14 RX ORDER — WARFARIN SODIUM 2.5 MG/1
2.5 TABLET ORAL DAILY
Qty: 90 TABLET | Refills: 0 | Status: SHIPPED | OUTPATIENT
Start: 2018-11-14 | End: 2019-02-26

## 2018-11-14 NOTE — MR AVS SNAPSHOT
Harseh Rock   11/14/2018 11:00 AM   Anticoagulation Therapy Visit    Description:  68 year old male   Provider:  TY ANTI COAG   Department:  [facility] Nurse           INR as of 11/14/2018     Today's INR 3.7!      Anticoagulation Summary as of 11/14/2018     INR goal 2.0-3.0   Today's INR 3.7!   Full warfarin instructions 11/14: Hold; Otherwise 1.25 mg on Mon, Fri; 2.5 mg all other days   Next INR check 11/29/2018    Indications   Long-term (current) use of anticoagulants [Z79.01] [Z79.01]  Atrial fibrillation (H) [I48.91]         Instructions    Some signs and symptoms of bleeding include: Nose bleed or cut that does not stop bleeding in 10 minutes, bleeding of the gums, vomiting (will look like coffee grounds) or coughing up blood, unusual, easy or large areas of bruising, increased or unexpected vaginal bleeding or increased menstrual flow, red or black stools, red or orange urine, prolonged or severe headache, pale skin, unusual or constant tiredness.  If you have these please call 911 or seek medical care immediately.            Your next Anticoagulation Clinic appointment(s)     Nov 29, 2018 10:45 AM CST   Anticoagulation Visit with TY ANTI LEONIDES   [facility] ([facility]    [address] [address] [phone]              Contact Numbers     [facility]  Please call [phone] to cancel and/or reschedule your appointment   Please call [phone] with any problems or questions regarding your therapy.        November 2018 Details    Sun Mon Tue Wed Thu Fri Sat         1               2               3                 4               5               6               7               8               9               10                 11               12               13               14      Hold   See details      15      2.5 mg         16      1.25 mg         17      2.5 mg           18      2.5 mg         19      1.25 mg         20      2.5 mg         21       2.5 mg         22      2.5 mg         23      1.25 mg         24      2.5 mg           25      2.5 mg         26      1.25 mg         27      2.5 mg         28      2.5 mg         29            30                 Date Details   11/14 This INR check       Date of next INR:  11/29/2018         How to take your warfarin dose     To take:  1.25 mg Take 0.5 of a 2.5 mg tablet.    To take:  2.5 mg Take 1 of the 2.5 mg tablets.    Hold Do not take your warfarin dose. See the Details table to the right for additional instructions.

## 2018-11-14 NOTE — PROGRESS NOTES
ANTICOAGULATION FOLLOW-UP CLINIC VISIT    Patient Name:  Haresh Rock  Date:  11/14/2018  Contact Type:  Face to Face    SUBJECTIVE:     Patient Findings     Positives No Problem Findings, Unexplained INR or factor level change    Comments Bleeding Signs/Symptoms: NO  Thromboembolic Signs/Symptoms: NO     Medication Changes:  NO  Dietary Changes:  NO  Bacterial/Viral Infection: NO     Missed Coumadin Doses: NO  Other Concerns:  Due to multiple supra therapeutic INR's maintenance dose was decreased by 14% today             OBJECTIVE    INR Protime   Date Value Ref Range Status   11/14/2018 3.7 (A) 0.86 - 1.14 Final     Factor 2 Assay   Date Value Ref Range Status   05/01/2007 58 (L) 60 - 140 % Final     Comment:     (Note)  CALLED TO TAMARA(INTERV. RADIOLOGY)[id number],        ASSESSMENT / PLAN  INR assessment SUPRA    Recheck INR In: 2 WEEKS    INR Location Clinic      Anticoagulation Summary as of 11/14/2018     INR goal 2.0-3.0   Today's INR 3.7!   Warfarin maintenance plan 1.25 mg (2.5 mg x 0.5) on Mon, Fri; 2.5 mg (2.5 mg x 1) all other days   Full warfarin instructions 11/14: Hold; Otherwise 1.25 mg on Mon, Fri; 2.5 mg all other days   Weekly warfarin total 15 mg   Plan last modified Laverne Ferguson, RN (11/14/2018)   Next INR check 11/29/2018   Priority INR   Target end date Indefinite    Indications   Long-term (current) use of anticoagulants [Z79.01] [Z79.01]  Atrial fibrillation (H) [I48.91]         Anticoagulation Episode Summary     INR check location     Preferred lab     Send INR reminders to [facility]    Comments       Anticoagulation Care Providers     Provider Role Specialty Phone number    Anya Nowak MD [facility] [phone]            See the Encounter Report to view Anticoagulation Flowsheet and Dosing Calendar (Go to Encounters tab in chart review, and find the Anticoagulation Therapy Visit)    Dosage adjustment made based on physician directed care  plan.    Laverne Ferguson RN

## 2018-11-15 ENCOUNTER — TELEPHONE (OUTPATIENT)
Dept: CARDIOLOGY | Facility: CLINIC | Age: 68
End: 2018-11-15

## 2018-11-15 NOTE — TELEPHONE ENCOUNTER
----- Message from Bekah Matt MD sent at 11/14/2018 10:29 PM CST -----  The stress ECG is fine, thanks.      ----- Message -----     From: Tiffany Mckeon RN     Sent: 11/13/2018   1:23 PM       To: Bekah Matt MD, Nina Anne RN    flacainide pt-please review EKG tracings and advise.  Tiffany Mckeon RN

## 2018-11-15 NOTE — TELEPHONE ENCOUNTER
Spoke to patient and relayed MD's response. Advised to continue same medication regimen.  .Patient verbalized understanding.      Tiffany Mckeon RN

## 2018-11-20 ENCOUNTER — APPOINTMENT (OUTPATIENT)
Dept: LAB | Facility: CLINIC | Age: 68
End: 2018-11-20
Attending: INTERNAL MEDICINE
Payer: MEDICARE

## 2018-11-20 ENCOUNTER — ONCOLOGY VISIT (OUTPATIENT)
Dept: TRANSPLANT | Facility: CLINIC | Age: 68
End: 2018-11-20
Attending: INTERNAL MEDICINE
Payer: MEDICARE

## 2018-11-20 VITALS
TEMPERATURE: 97.2 F | WEIGHT: 206.3 LBS | BODY MASS INDEX: 27.98 KG/M2 | RESPIRATION RATE: 18 BRPM | OXYGEN SATURATION: 97 % | SYSTOLIC BLOOD PRESSURE: 100 MMHG | DIASTOLIC BLOOD PRESSURE: 63 MMHG | HEART RATE: 68 BPM

## 2018-11-20 DIAGNOSIS — E04.1 THYROID NODULE: ICD-10-CM

## 2018-11-20 DIAGNOSIS — E83.110 HEREDITARY HEMOCHROMATOSIS (H): ICD-10-CM

## 2018-11-20 DIAGNOSIS — C81.99 HODGKIN'S DISEASE OF EXTRANODAL OR SOLID ORGAN SITE (H): ICD-10-CM

## 2018-11-20 DIAGNOSIS — Z79.01 LONG TERM CURRENT USE OF ANTICOAGULANT THERAPY: ICD-10-CM

## 2018-11-20 DIAGNOSIS — I48.0 PAROXYSMAL ATRIAL FIBRILLATION (H): ICD-10-CM

## 2018-11-20 LAB
ALBUMIN SERPL-MCNC: 3.6 G/DL (ref 3.4–5)
ALP SERPL-CCNC: 120 U/L (ref 40–150)
ALT SERPL W P-5'-P-CCNC: 31 U/L (ref 0–70)
ANION GAP SERPL CALCULATED.3IONS-SCNC: 10 MMOL/L (ref 3–14)
AST SERPL W P-5'-P-CCNC: 40 U/L (ref 0–45)
BASOPHILS # BLD AUTO: 0.1 10E9/L (ref 0–0.2)
BASOPHILS NFR BLD AUTO: 1 %
BILIRUB SERPL-MCNC: 0.9 MG/DL (ref 0.2–1.3)
BUN SERPL-MCNC: 25 MG/DL (ref 7–30)
CALCIUM SERPL-MCNC: 8.2 MG/DL (ref 8.5–10.1)
CHLORIDE SERPL-SCNC: 104 MMOL/L (ref 94–109)
CO2 SERPL-SCNC: 22 MMOL/L (ref 20–32)
CREAT SERPL-MCNC: 1.16 MG/DL (ref 0.66–1.25)
CRP SERPL-MCNC: 5.8 MG/L (ref 0–8)
DIFFERENTIAL METHOD BLD: ABNORMAL
EOSINOPHIL # BLD AUTO: 0.1 10E9/L (ref 0–0.7)
EOSINOPHIL NFR BLD AUTO: 1.8 %
ERYTHROCYTE [DISTWIDTH] IN BLOOD BY AUTOMATED COUNT: 12.1 % (ref 10–15)
FERRITIN SERPL-MCNC: 184 NG/ML (ref 26–388)
GFR SERPL CREATININE-BSD FRML MDRD: 62 ML/MIN/1.7M2
GLUCOSE SERPL-MCNC: 176 MG/DL (ref 70–99)
HCT VFR BLD AUTO: 47.4 % (ref 40–53)
HGB BLD-MCNC: 16.9 G/DL (ref 13.3–17.7)
IMM GRANULOCYTES # BLD: 0 10E9/L (ref 0–0.4)
IMM GRANULOCYTES NFR BLD: 0.3 %
IRON SATN MFR SERPL: 83 % (ref 15–46)
IRON SERPL-MCNC: 204 UG/DL (ref 35–180)
LDH SERPL L TO P-CCNC: 214 U/L (ref 85–227)
LYMPHOCYTES # BLD AUTO: 2.5 10E9/L (ref 0.8–5.3)
LYMPHOCYTES NFR BLD AUTO: 31.4 %
MCH RBC QN AUTO: 33.1 PG (ref 26.5–33)
MCHC RBC AUTO-ENTMCNC: 35.7 G/DL (ref 31.5–36.5)
MCV RBC AUTO: 93 FL (ref 78–100)
MONOCYTES # BLD AUTO: 0.8 10E9/L (ref 0–1.3)
MONOCYTES NFR BLD AUTO: 10.3 %
NEUTROPHILS # BLD AUTO: 4.4 10E9/L (ref 1.6–8.3)
NEUTROPHILS NFR BLD AUTO: 55.2 %
NRBC # BLD AUTO: 0 10*3/UL
NRBC BLD AUTO-RTO: 0 /100
PLATELET # BLD AUTO: 127 10E9/L (ref 150–450)
POTASSIUM SERPL-SCNC: 4.2 MMOL/L (ref 3.4–5.3)
PROT SERPL-MCNC: 7.7 G/DL (ref 6.8–8.8)
RBC # BLD AUTO: 5.11 10E12/L (ref 4.4–5.9)
RETICS # AUTO: 100.7 10E9/L (ref 25–95)
RETICS/RBC NFR AUTO: 2 % (ref 0.5–2)
SODIUM SERPL-SCNC: 136 MMOL/L (ref 133–144)
TIBC SERPL-MCNC: 245 UG/DL (ref 240–430)
WBC # BLD AUTO: 7.9 10E9/L (ref 4–11)

## 2018-11-20 PROCEDURE — 80053 COMPREHEN METABOLIC PANEL: CPT | Performed by: INTERNAL MEDICINE

## 2018-11-20 PROCEDURE — 36415 COLL VENOUS BLD VENIPUNCTURE: CPT

## 2018-11-20 PROCEDURE — 83550 IRON BINDING TEST: CPT | Performed by: INTERNAL MEDICINE

## 2018-11-20 PROCEDURE — 00000402 ZZHCL STATISTIC TOTAL PROTEIN: Performed by: INTERNAL MEDICINE

## 2018-11-20 PROCEDURE — 82728 ASSAY OF FERRITIN: CPT | Performed by: INTERNAL MEDICINE

## 2018-11-20 PROCEDURE — 85025 COMPLETE CBC W/AUTO DIFF WBC: CPT | Performed by: INTERNAL MEDICINE

## 2018-11-20 PROCEDURE — 83615 LACTATE (LD) (LDH) ENZYME: CPT | Performed by: INTERNAL MEDICINE

## 2018-11-20 PROCEDURE — 86140 C-REACTIVE PROTEIN: CPT | Performed by: INTERNAL MEDICINE

## 2018-11-20 PROCEDURE — G0463 HOSPITAL OUTPT CLINIC VISIT: HCPCS

## 2018-11-20 PROCEDURE — 85045 AUTOMATED RETICULOCYTE COUNT: CPT | Performed by: INTERNAL MEDICINE

## 2018-11-20 PROCEDURE — 83540 ASSAY OF IRON: CPT | Performed by: INTERNAL MEDICINE

## 2018-11-20 PROCEDURE — 84165 PROTEIN E-PHORESIS SERUM: CPT | Performed by: INTERNAL MEDICINE

## 2018-11-20 ASSESSMENT — PAIN SCALES - GENERAL: PAINLEVEL: NO PAIN (1)

## 2018-11-20 NOTE — NURSING NOTE
Chief Complaint   Patient presents with     Blood Draw     Right AC butterfly  X 1, labs drawn and sent, vitals completed, checked into next appointment.   Kayla Wood RN

## 2018-11-20 NOTE — NURSING NOTE
Oncology Rooming Note    November 20, 2018 10:32 AM   Haresh Rock is a 68 year old male who presents for:    Chief Complaint   Patient presents with     Blood Draw     Right AC butterfly  X 1, labs drawn and sent, vitals completed, checked into next appointment.     Initial Vitals: /63 (BP Location: Right arm, Patient Position: Sitting, Cuff Size: Adult Regular)  Pulse 68  Temp 97.2  F (36.2  C) (Oral)  Resp 18  Wt 93.6 kg (206 lb 4.8 oz)  SpO2 97%  BMI 27.98 kg/m2 Estimated body mass index is 27.98 kg/(m^2) as calculated from the following:    Height as of 1/24/18: 1.829 m (6').    Weight as of this encounter: 93.6 kg (206 lb 4.8 oz). Body surface area is 2.18 meters squared.  No Pain (1) Comment: Data Unavailable   No LMP for male patient.  Allergies reviewed: Yes  Medications reviewed: Yes    Medications: Medication refills not needed today.  Pharmacy name entered into [facility]: [id number] - [place], [place] - [address]    Clinical concerns: none     6 minutes for nursing intake (face to face time)     Reshma Irving MA

## 2018-11-20 NOTE — PROGRESS NOTES
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 Per cardiology note on 10/22, his atrial fibrillation was not well-controlled, as he had multiple episodes of tachycardia despite being on sotalol. He was switched to metoprolol+flecainide and he has done quite well, as his heart rate has not been over 100 in the past month (according to his Apple watch). Stress EKG on 11/06 was unremarkable. He had an episode of fever and tachycardia for 1-2 days after receiving a flu shot, which he thinks might have been an allergic reaction. He has been experiencing some episodes of nosebleeds, and had a small nodule removed from the right nasal vestibule about two weeks ago which was found to be pre-cancerous. He has extensive seborrheic keratosis involving the back, which he thinks has been gradually developing over multiple years. He was seen by a dermatologist and one plaque was biopsied, which was found to be non-malignant. He gets around okay and his energy levels are good, though he does not get much exercise. It has been about two years since his last phlebotomy.    PAST MEDICAL HISTORY:  See my note from 05/2018    SOCIAL HISTORY:  See my note from 05/2018     FAMILY HISTORY:  See my note from  05/2018     REVIEW OF SYSTEMS:  A 12-point review of systems is done and is negative, except as in the HPI.      PHYSICAL EXAMINATION:   GENERAL:  He is an alert gentleman in no acute distress.   VITAL SIGNS:  Stable. /63 (BP Location: Right arm, Patient Position: Sitting, Cuff Size: Adult Regular)  Pulse 68  Temp 97.2  F  (36.2  C) (Oral)  Resp 18  Wt 93.6 kg (206 lb 4.8 oz)  SpO2 97%  BMI 27.98 kg/m2    HEENT:  Normocephalic. EOM okay, no scleral icterus. Mouth without lesion. Small lesion noted on the lateral aspect of the right nasal vestibule.  RESPIRATORY:  Clear to percussion and auscultation.   LYMPH: No cervical, supraclavicular, submandibular, axillary or inguinal nodes.   CARDIAC: Normal sinus rhythm.  No S3, S4. 1-2/6 SAYDA best heard along the right sternal border with no radiation.  ABDOMEN:  Soft without hepatosplenomegaly.  SKIN:  Extremities appear warm and well-perfused without edema.  Extensive brown-black plaques noted on the back, concentrated more on the upper back.    Results for PAMELA GRANADOS (MRN [id number]) as of 11/20/2018 10:57   Ref. Range 11/20/2018 10:08   Sodium Latest Ref Range: 133 - 144 mmol/L 136   Potassium Latest Ref Range: 3.4 - 5.3 mmol/L 4.2   Chloride Latest Ref Range: 94 - 109 mmol/L 104   Carbon Dioxide Latest Ref Range: 20 - 32 mmol/L 22   Urea Nitrogen Latest Ref Range: 7 - 30 mg/dL 25   Creatinine Latest Ref Range: 0.66 - 1.25 mg/dL 1.16   GFR Estimate Latest Ref Range: >60 mL/min/1.7m2 62   GFR Estimate If Black Latest Ref Range: >60 mL/min/1.7m2 75   Calcium Latest Ref Range: 8.5 - 10.1 mg/dL 8.2 (L)   Anion Gap Latest Ref Range: 3 - 14 mmol/L 10   Albumin Latest Ref Range: 3.4 - 5.0 g/dL 3.6   Protein Total Latest Ref Range: 6.8 - 8.8 g/dL 7.7   Bilirubin Total Latest Ref Range: 0.2 - 1.3 mg/dL 0.9   Alkaline Phosphatase Latest Ref Range: 40 - 150 U/L 120   ALT Latest Ref Range: 0 - 70 U/L 31   AST Latest Ref Range: 0 - 45 U/L 40   CRP Inflammation Latest Ref Range: 0.0 - 8.0 mg/L 5.8   Lactate Dehydrogenase Latest Ref Range: 85 - 227 U/L 214   Glucose Latest Ref Range: 70 - 99 mg/dL 176 (H)   WBC Latest Ref Range: 4.0 - 11.0 10e9/L 7.9   Hemoglobin Latest Ref Range: 13.3 - 17.7 g/dL 16.9   Hematocrit Latest Ref Range: 40.0 - 53.0 % 47.4   Platelet Count Latest Ref Range: 150 - 450  10e9/L 127 (L)   RBC Count Latest Ref Range: 4.4 - 5.9 10e12/L 5.11   MCV Latest Ref Range: 78 - 100 fl 93   MCH Latest Ref Range: 26.5 - 33.0 pg 33.1 (H)   MCHC Latest Ref Range: 31.5 - 36.5 g/dL 35.7   RDW Latest Ref Range: 10.0 - 15.0 % 12.1   Diff Method Unknown Automated Method   % Neutrophils Latest Units: % 55.2   % Lymphocytes Latest Units: % 31.4   % Monocytes Latest Units: % 10.3   % Eosinophils Latest Units: % 1.8   % Basophils Latest Units: % 1.0   % Immature Granulocytes Latest Units: % 0.3   Nucleated RBCs Latest Ref Range: 0 /100 0   Absolute Neutrophil Latest Ref Range: 1.6 - 8.3 10e9/L 4.4   Absolute Lymphocytes Latest Ref Range: 0.8 - 5.3 10e9/L 2.5   Absolute Monocytes Latest Ref Range: 0.0 - 1.3 10e9/L 0.8   Absolute Eosinophils Latest Ref Range: 0.0 - 0.7 10e9/L 0.1   Absolute Basophils Latest Ref Range: 0.0 - 0.2 10e9/L 0.1   Abs Immature Granulocytes Latest Ref Range: 0 - 0.4 10e9/L 0.0   Absolute Nucleated RBC Unknown 0.0   % Retic Latest Ref Range: 0.5 - 2.0 % 2.0   Absolute Retic Latest Ref Range: 25 - 95 10e9/L 100.7 (H)       ASSESSMENT:     1.  Myeloproliferative syndrome/MDS.   2.  Status post non-myeloablative allo-sib peripheral blood stem cell transplant.   3.  History of chronic zpmjq-ougrfv-ccgd disease.   4.  History of Hodgkin's disease.   5.  History of cardiac arrhythmias, SVT and V tach.   6.  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   7.  History of cirrhosis.   8.  History of pulmonary emboli.   9.  History of elevated hemoglobin A1c.   10.  Thyroid nodule.    11.  Cholelithiasis.     12.  Diverticulosis.   13.  Anticoagulation.   14.  Status post bilateral knee replacement.     15.  Aortic stenosis  16. 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  17. Seborrheic keratosis of the back.     In general Haresh has been well. Hodgkin's lymphoma remains in remission. It looks like his atrial fibrillation is  well-controlled so far with metoprolol+flecainide. Today his ferritin is 184 and iron saturation is 83%, both of which are higher than I would like. He likely needs phlebotomy in the near future, but I would like to hold off a bit so that he can adjust to his Afib medications. INR is also a bit long at 3.7 and should be adjusted by the coumadin clinic. Plan is to get iron panels in 3 months, and I will see him again in 6 months' time, likely for phlebotomy as well if not before.May try to lower ferritin to 25-50

## 2018-11-20 NOTE — MR AVS SNAPSHOT
After Visit Summary   11/20/2018    Haresh Rock    MRN: [id number]           Patient Information     Date Of Birth          1950        Visit Information        Provider Department      11/20/2018 10:30 AM uAgusto Miller MD [facility] Blood and Marrow Transplant        Today's Diagnoses     Hodgkin's disease of extranodal or solid organ site (H)        Hereditary hemochromatosis (H)        Paroxysmal atrial fibrillation (H)        Thyroid nodule        Long term current use of anticoagulant therapy              [facility] [place]  [address] [place] [address]  Phone: [phone]  Clinic Hours:   Monday-Thursday:7am to 7pm   Friday: 7am to 5pm   Weekends and holidays:    8am to noon (in general)  If your fever is 100.5  or greater,   call the clinic.  After hours call the   hospital at [phone] or   [phone]. Ask for the BMT   fellow on-call            Follow-ups after your visit        Your next 10 appointments already scheduled     Nov 29, 2018 10:45 AM CST   Anticoagulation Visit with NICOLAS ANTI COAG   [facility] ([facility])    [address] [place] [id number]   [phone]            Jan 28, 2019  1:30 PM CST   Return Visit with Bekah Matt MD   [facility] ([facility])    [address] [place] [id number]   [phone]            Feb 13, 2019 10:30 AM CST   LAB with NICOLAS LAB   [facility] ([facility])    [address] [place] [id number]   [phone]           Please do not eat 10-12 hours before your appointment if you are coming in fasting for labs on lipids, cholesterol, or glucose (sugar). This does not apply to pregnant women. Water, hot tea and black coffee (with nothing added) are okay. Do not drink other fluids, diet soda or chew gum.            May 21, 2019 10:15 AM ISADORA Rich  Lab Draw with  MASONIC LAB DRAW   [facility] ([facility])    [address]  [address]  [facility] [id number] [phone]            May 21, 2019 11:00 AM CDT   Return with Augusto Miller MD   [facility] Blood and Marrow Transplant ([facility])    [address]  [address]  [facility] [id number] [phone]              Future tests that were ordered for you today     Open Future Orders        Priority Expected Expires Ordered    CBC with platelets differential Routine 2/20/2019 4/20/2019 11/20/2018    Comprehensive metabolic panel Routine 2/20/2019 4/20/2019 11/20/2018    Ferritin Routine 2/20/2019 4/20/2019 11/20/2018    Iron and iron binding capacity Routine 2/20/2019 4/20/2019 11/20/2018    INR Routine 2/20/2019 4/20/2019 11/20/2018            Who to contact     If you have questions or need follow up information about today's clinic visit or your schedule please contact [facility] BLOOD AND MARROW TRANSPLANT directly at [phone].  Normal or non-critical lab and imaging results will be communicated to you by [employer], letter or phone within 4 business days after the clinic has received the results. If you do not hear from us within 7 days, please contact the clinic through [employer] or phone. If you have a critical or abnormal lab result, we will notify you by phone as soon as possible.  Submit refill requests through [employer] or call your pharmacy and they will forward the refill request to us. Please allow 3 business days for your refill to be completed.          Additional Information About Your Visit        [employer] Information     [employer] gives you secure access to your electronic health record. If you see a primary care provider, you can also send messages to your care team and make appointments. If you have questions, please call your primary care clinic.  If you do not have a primary care provider, please call  [phone] and they will assist you.        Care EveryWhere ID     This is your Care EveryWhere ID. This could be used by other organizations to access your [facility] medical records  [id number]        Your Vitals Were     Pulse Temperature Respirations Pulse Oximetry BMI (Body Mass Index)       68 97.2  F (36.2  C) (Oral) 18 97% 27.98 kg/m2        Blood Pressure from Last 3 Encounters:   11/20/18 100/63   10/22/18 128/87   09/18/18 113/77    Weight from Last 3 Encounters:   11/20/18 93.6 kg (206 lb 4.8 oz)   10/22/18 93.4 kg (206 lb)   09/18/18 93 kg (205 lb)              We Performed the Following     CBC with platelets differential     Comprehensive metabolic panel     CRP inflammation     Ferritin     Iron and iron binding capacity     Lactate Dehydrogenase     Protein electrophoresis     Reticulocyte count        Recent Review Flowsheet Data     BMT Recent Results Latest Ref Rng & Units 7/11/2018 8/8/2018 9/12/2018 10/17/2018 10/31/2018 11/14/2018 11/20/2018    WBC 4.0 - 11.0 10e9/L - - - - - - 7.9    Hemoglobin 13.3 - 17.7 g/dL - - - - - - 16.9    Platelet Count 150 - 450 10e9/L - - - - - - 127(L)    Neutrophils (Absolute) 1.6 - 8.3 10e9/L - - - - - - 4.4    INR 0.86 - 1.14 2.2(A) 2.5(A) 3.2(A) 3.5(A) 3.3(A) 3.7(A) -    Sodium 133 - 144 mmol/L - - - - - - 136    Potassium 3.4 - 5.3 mmol/L - - - - - - 4.2    Chloride 94 - 109 mmol/L - - - - - - 104    Glucose 70 - 99 mg/dL - - - - - - 176(H)    Urea Nitrogen 7 - 30 mg/dL - - - - - - 25    Creatinine 0.66 - 1.25 mg/dL - - - - - - 1.16    Calcium (Total) 8.5 - 10.1 mg/dL - - - - - - 8.2(L)    Protein (Total) 6.8 - 8.8 g/dL - - - - - - 7.7    Albumin 3.4 - 5.0 g/dL - - - - - - 3.6    Alkaline Phosphatase 40 - 150 U/L - - - - - - 120    AST 0 - 45 U/L - - - - - - 40    ALT 0 - 70 U/L - - - - - - 31    MCV 78 - 100 fl - - - - - - 93               Primary Care Provider Office Phone # Fax #    Anya Nowak [phone] [address]  [place] [address]        Equal Access to Services     [facility] : Hadii sabas escalante pawan Ann, waakankshada luqadaha, qaybta karaadmarko kamaraharshadmarko, priya koeniglaurenmiguel angel cunningham. So [facility] [phone].    ATENCIÓN: Si habla español, tiene a barger disposición servicios gratuitos de asistencia lingüística. Tammyame al [phone].    We comply with applicable federal civil rights laws and [place] laws. We do not discriminate on the basis of race, color, national origin, age, disability, sex, sexual orientation, or gender identity.            Thank you!     Thank you for choosing [facility] BLOOD AND MARROW TRANSPLANT  for your care. Our goal is always to provide you with excellent care. Hearing back from our patients is one way we can continue to improve our services. Please take a few minutes to complete the written survey that you may receive in the mail after your visit with us. Thank you!             Your Updated Medication List - Protect others around you: Learn how to safely use, store and throw away your medicines at www.disposemymeds.org.          This list is accurate as of 11/20/18 11:39 AM.  Always use your most recent med list.                   Brand Name Dispense Instructions for use Diagnosis    ARTIFICIAL TEAR SOLUTION OP      Apply  to eye. USE AS DIRECTED(DF)        CENTRUM PO      1 TABLET DAILY        flecainide 50 MG tablet    TAMBOCOR    60 tablet    Take 1 tablet (50 mg) by mouth 2 times daily    Paroxysmal atrial fibrillation (H), Paroxysmal supraventricular tachycardia (H)       folic acid 1 MG tablet    FOLVITE    90 tablet    Take 1 tablet (1,000 mcg) by mouth daily    Cwsgh-squrgq-yroc disease, chronic (H), Lymphoma, unspecified body region, unspecified lymphoma type (H)       metoprolol tartrate 25 MG tablet    LOPRESSOR    60 tablet    Take 1 tablet (25 mg) by mouth 2 times daily    Paroxysmal atrial fibrillation (H), Paroxysmal supraventricular tachycardia (H)       NYQUIL PO       Take by mouth as needed        pantoprazole 20 MG EC tablet    PROTONIX    90 tablet    Take 1 tablet (20 mg) by mouth At Bedtime    Kikac-usovdk-eflq disease, chronic (H), Hodgkin's disease of extranodal or solid organ site (H)       PREVIDENT 5000 DRY MOUTH 1.1 % Gel topical gel   Generic drug:  sodium fluoride dental gel      Take by mouth daily    Hodgkin's disease of extranodal or solid organ site (H), Status post bone marrow transplant (H), Hereditary hemochromatosis (H), Thyroid nodule       simvastatin 10 MG tablet    ZOCOR    90 tablet    TAKE 1 TABLET (10 MG) BY MOUTH AT BEDTIME    Hyperlipidemia with target LDL less than 100       warfarin 2.5 MG tablet    COUMADIN    90 tablet    Take 1 tablet (2.5 mg) by mouth daily or as directed by coumadin clinic    Atrial fibrillation, unspecified type (H), Antiphospholipid antibody syndrome (H)

## 2018-11-21 ENCOUNTER — TRANSFERRED RECORDS (OUTPATIENT)
Dept: HEALTH INFORMATION MANAGEMENT | Facility: CLINIC | Age: 68
End: 2018-11-21

## 2018-11-21 DIAGNOSIS — I48.0 PAROXYSMAL ATRIAL FIBRILLATION (H): ICD-10-CM

## 2018-11-21 DIAGNOSIS — I47.10 PAROXYSMAL SUPRAVENTRICULAR TACHYCARDIA (H): ICD-10-CM

## 2018-11-21 LAB
ALBUMIN SERPL ELPH-MCNC: 3.9 G/DL (ref 3.7–5.1)
ALPHA1 GLOB SERPL ELPH-MCNC: 0.3 G/DL (ref 0.2–0.4)
ALPHA2 GLOB SERPL ELPH-MCNC: 0.8 G/DL (ref 0.5–0.9)
B-GLOBULIN SERPL ELPH-MCNC: 0.9 G/DL (ref 0.6–1)
GAMMA GLOB SERPL ELPH-MCNC: 1.3 G/DL (ref 0.7–1.6)
M PROTEIN SERPL ELPH-MCNC: 0 G/DL
PROT PATTERN SERPL ELPH-IMP: NORMAL

## 2018-11-21 RX ORDER — FLECAINIDE ACETATE 50 MG/1
50 TABLET ORAL 2 TIMES DAILY
Qty: 180 TABLET | Refills: 1 | Status: SHIPPED | OUTPATIENT
Start: 2018-11-21 | End: 2019-03-07

## 2018-11-21 RX ORDER — METOPROLOL TARTRATE 25 MG/1
25 TABLET, FILM COATED ORAL 2 TIMES DAILY
Qty: 180 TABLET | Refills: 1 | Status: SHIPPED | OUTPATIENT
Start: 2018-11-21 | End: 2019-03-07

## 2018-11-29 ENCOUNTER — ANTICOAGULATION THERAPY VISIT (OUTPATIENT)
Dept: NURSING | Facility: CLINIC | Age: 68
End: 2018-11-29
Payer: COMMERCIAL

## 2018-11-29 DIAGNOSIS — I48.0 PAROXYSMAL ATRIAL FIBRILLATION (H): ICD-10-CM

## 2018-11-29 LAB — INR POINT OF CARE: 2.6 (ref 0.86–1.14)

## 2018-11-29 PROCEDURE — 85610 PROTHROMBIN TIME: CPT | Mod: QW

## 2018-11-29 PROCEDURE — 99207 ZZC NO CHARGE NURSE ONLY: CPT

## 2018-11-29 PROCEDURE — 36416 COLLJ CAPILLARY BLOOD SPEC: CPT

## 2018-11-29 NOTE — MR AVS SNAPSHOT
Haresh Rock   11/29/2018 10:45 AM   Anticoagulation Therapy Visit    Description:  68 year old male   Provider:  TY ANTI COAG   Department:  Ty Nurse           INR as of 11/29/2018     Today's INR 2.6      Anticoagulation Summary as of 11/29/2018     INR goal 2.0-3.0   Today's INR 2.6   Full warfarin instructions 1.25 mg on Mon, Fri; 2.5 mg all other days   Next INR check 12/20/2018    Indications   Long-term (current) use of anticoagulants [Z79.01] [Z79.01]  Atrial fibrillation (H) [I48.91]         Your next Anticoagulation Clinic appointment(s)     Dec 20, 2018 10:45 AM CST   Anticoagulation Visit with TY ANTI LEONIDES   [facility] ([address] [address] [phone]              Contact Numbers     [facility]  Please call [phone] to cancel and/or reschedule your appointment   Please call [phone] with any problems or questions regarding your therapy.        November 2018 Details    Sun Mon Tue Wed Thu Fri Sat         1               2               3                 4               5               6               7               8               9               10                 11               12               13               14               15               16               17                 18               19               20               21               22               23               24                 25               26               27               28               29      2.5 mg   See details      30      1.25 mg           Date Details   11/29 This INR check               How to take your warfarin dose     To take:  1.25 mg Take 0.5 of a 2.5 mg tablet.    To take:  2.5 mg Take 1 of the 2.5 mg tablets.           December 2018 Details    Sun Mon Tue Wed Thu Fri Sat           1      2.5 mg           2      2.5 mg         3      1.25 mg         4      2.5 mg         5      2.5 mg         6      2.5 mg         7       1.25 mg         8      2.5 mg           9      2.5 mg         10      1.25 mg         11      2.5 mg         12      2.5 mg         13      2.5 mg         14      1.25 mg         15      2.5 mg           16      2.5 mg         17      1.25 mg         18      2.5 mg         19      2.5 mg         20            21               22                 23               24               25               26               27               28               29                 30               31                     Date Details   No additional details    Date of next INR:  12/20/2018         How to take your warfarin dose     To take:  1.25 mg Take 0.5 of a 2.5 mg tablet.    To take:  2.5 mg Take 1 of the 2.5 mg tablets.

## 2018-11-29 NOTE — PROGRESS NOTES
ANTICOAGULATION FOLLOW-UP CLINIC VISIT    Patient Name:  Haresh Rock  Date:  11/29/2018  Contact Type:  Face to Face    SUBJECTIVE:     Patient Findings     Positives No Problem Findings           OBJECTIVE    INR Protime   Date Value Ref Range Status   11/29/2018 2.6 (A) 0.86 - 1.14 Final     Factor 2 Assay   Date Value Ref Range Status   05/01/2007 58 (L) 60 - 140 % Final     Comment:     (Note)  CALLED TO TAMARA(INTERV. RADIOLOGY)[id number],        ASSESSMENT / PLAN  INR assessment THER    Recheck INR In: 3 WEEKS    INR Location Clinic      Anticoagulation Summary as of 11/29/2018     INR goal 2.0-3.0   Today's INR 2.6   Warfarin maintenance plan 1.25 mg (2.5 mg x 0.5) on Mon, Fri; 2.5 mg (2.5 mg x 1) all other days   Full warfarin instructions 1.25 mg on Mon, Fri; 2.5 mg all other days   Weekly warfarin total 15 mg   No change documented Mayi Gagnon RN   Plan last modified Laverne Ferguson RN (11/14/2018)   Next INR check 12/20/2018   Priority INR   Target end date Indefinite    Indications   Long-term (current) use of anticoagulants [Z79.01] [Z79.01]  Atrial fibrillation (H) [I48.91]         Anticoagulation Episode Summary     INR check location     Preferred lab     Send INR reminders to [facility]    Comments       Anticoagulation Care Providers     Provider Role Specialty Phone number    Anya Nowak MD [facility] system Practice [phone]            See the Encounter Report to view Anticoagulation Flowsheet and Dosing Calendar (Go to Encounters tab in chart review, and find the Anticoagulation Therapy Visit)    Dosage adjustment made based on physician directed care plan.    Mayi Gagnon RN

## 2019-01-09 ENCOUNTER — TELEPHONE (OUTPATIENT)
Dept: FAMILY MEDICINE | Facility: CLINIC | Age: 69
End: 2019-01-09

## 2019-01-09 NOTE — TELEPHONE ENCOUNTER
Message left for patient to call the INR Clinic # at [phone] to schedule an INR appointment. Was due for an INR check on 12/20/18    Laverne Ferguson RN - BC

## 2019-01-10 ENCOUNTER — ANTICOAGULATION THERAPY VISIT (OUTPATIENT)
Dept: NURSING | Facility: CLINIC | Age: 69
End: 2019-01-10
Payer: COMMERCIAL

## 2019-01-10 DIAGNOSIS — I48.0 PAROXYSMAL ATRIAL FIBRILLATION (H): ICD-10-CM

## 2019-01-10 LAB — INR POINT OF CARE: 2.9 (ref 0.86–1.14)

## 2019-01-10 PROCEDURE — 99207 ZZC NO CHARGE NURSE ONLY: CPT

## 2019-01-10 PROCEDURE — 36416 COLLJ CAPILLARY BLOOD SPEC: CPT

## 2019-01-10 PROCEDURE — 85610 PROTHROMBIN TIME: CPT | Mod: QW

## 2019-01-10 NOTE — PATIENT INSTRUCTIONS
[facility]:  Please call [phone] to cancel and/or reschedule your appointment   Please call [phone] with any problems or questions regarding your Warfarin therapy.

## 2019-01-10 NOTE — PROGRESS NOTES
ANTICOAGULATION FOLLOW-UP CLINIC VISIT    Patient Name:  Harehs Rock  Date:  1/10/2019  Contact Type:  Face to Face    SUBJECTIVE:     Patient Findings     Positives:   No Problem Findings    Comments:   Bleeding Signs/Symptoms: NO  Thromboembolic Signs/Symptoms: NO     Medication Changes:  NO  Dietary Changes:  NO  Bacterial/Viral Infection: NO     Missed Coumadin Doses: NO  Other Concerns:  NO             OBJECTIVE    INR Protime   Date Value Ref Range Status   01/10/2019 2.9 (A) 0.86 - 1.14 Final     Factor 2 Assay   Date Value Ref Range Status   2007 58 (L) 60 - 140 % Final     Comment:     (Note)  CALLED TO TAMARA(INTERV. RADIOLOGY)[id number],        ASSESSMENT / PLAN  No question data found.  Anticoagulation Summary  As of 1/10/2019    INR goal:   2.0-3.0   TTR:   76.7 % (2.7 y)   INR used for dosin.9 (1/10/2019)   Warfarin maintenance plan:   1.25 mg (2.5 mg x 0.5) every Mon, Fri; 2.5 mg (2.5 mg x 1) all other days   Full warfarin instructions:   1.25 mg every Mon, Fri; 2.5 mg all other days   Weekly warfarin total:   15 mg   No change documented:   Laverne Ferguson RN   Plan last modified:   Laverne Ferguson RN (2018)   Next INR check:   2019   Priority:   INR   Target end date:   Indefinite    Indications    Long-term (current) use of anticoagulants [Z79.01] [Z79.01]  Atrial fibrillation (H) [I48.91]             Anticoagulation Episode Summary     INR check location:       Preferred lab:       Send INR reminders to:   [facility]    Comments:         Anticoagulation Care Providers     Provider Role Specialty Phone number    Anya Nowak MD [facility] [phone]            See the Encounter Report to view Anticoagulation Flowsheet and Dosing Calendar (Go to Encounters tab in chart review, and find the Anticoagulation Therapy Visit)        Laverne Ferguson RN

## 2019-01-28 ENCOUNTER — OFFICE VISIT (OUTPATIENT)
Dept: CARDIOLOGY | Facility: CLINIC | Age: 69
End: 2019-01-28
Payer: COMMERCIAL

## 2019-01-28 VITALS
BODY MASS INDEX: 27.94 KG/M2 | OXYGEN SATURATION: 99 % | SYSTOLIC BLOOD PRESSURE: 112 MMHG | HEART RATE: 76 BPM | DIASTOLIC BLOOD PRESSURE: 75 MMHG | WEIGHT: 206 LBS

## 2019-01-28 DIAGNOSIS — Z95.0 CARDIAC PACEMAKER IN SITU: Primary | ICD-10-CM

## 2019-01-28 DIAGNOSIS — I48.0 PAROXYSMAL ATRIAL FIBRILLATION (H): ICD-10-CM

## 2019-01-28 PROCEDURE — 99214 OFFICE O/P EST MOD 30 MIN: CPT | Mod: GC | Performed by: INTERNAL MEDICINE

## 2019-01-28 NOTE — PROGRESS NOTES
ELECTROPHYSIOLOGY SPECIALTY CONSULTATION      01/28/2019    REASON FOR VISIT:  The patient comes back for followup of paroxysmal atrial fibrillation and atrial tachycardia.      HISTORY OF PRESENT ILLNESS:  The patient is a 68-year-old gentleman with a history of paroxysmal atrial fibrillation and atrial tachycardia.  He is status post circumferential pulmonary vein isolation for paroxysmal atrial fibrillation in 03/2013, which provided him with an arrhythmia period lasting for about 5 years. Towards the end of 2017, he had recurrent atrial fibrillation and underwent re-isolation of pulmonary veins in 01/2018.  During that ablation, the patient was also found to have a right atrial tachycardia for which he underwent an ablation.  Of note, during that ablation an atypical atrial flutter was also noted which kept degenerating to atrial fibrillation and was not mappable.In 09/2018 and his sotalol dose of 40 mg twice daily was increased to 80 mg twice daily. Despite the increase, a Zio Patch done in September and 10/2018 showed 55 episodes of atrial tachycardia, the longest one lasting for several hours. The patient was not symptomatic for all of the episodes. At his last appointment in October 2018, sotalol was discontinued, and patient was started on metoprolol and then flecainide was initiated.     Interval History:  Patient reports he has felt great lately with no acute issues and reports his heart rate trend on his Apple watch is much improved. Pt denies any symptoms of dyspnea, exertional angina, palpitations, dizziness, lightheadedness, presyncope or syncope.    ROS:  A complete 10-point ROS was negative except as above.    PAST MEDICAL HISTORY:  Past Medical History:   Diagnosis Date     Antiphospholipid antibody syndrome (H) 2005    Ravi's     Atrial fibrillation (H) 4/2011     Cirrhosis (H)      CKD (chronic kidney disease) stage 1, GFR 90 ml/min or greater      CKD (chronic kidney disease) stage 2, GFR 60-89  ml/min      Diabetes mellitus type II     steroid induced     DJD (degenerative joint disease) of knee     SHAWN, worse on right     DVT (deep venous thrombosis) (H)      ED (erectile dysfunction)      Gallbladder polyp 5/2010     Dlnzh-Pufirt-Gtxa Disease 2007    BMT     Hemochromatosis      Hodgkin's disease NOS     5 cycles of ABVD in 2011     HTN (hypertension)      Hyperlipidaemia LDL goal <100      Myeloproliferative disorder (H)     atypical, [facility]     PE (pulmonary embolism) 11/2004     Polyneuropathy      Primary pulmonary hypertension (H)        PAST SURGICAL HISTORY:  Past Surgical History:   Procedure Laterality Date     ANESTHESIA CARDIOVERSION  4/21/2011    Procedure:ANESTHESIA CARDIOVERSION; Surgeon:GENERIC ANESTHESIA PROVIDER; Location:[facility]     ARTHROSCOPY KNEE RT/LT      LT     CATARACT IOL, RT/LT       COLONOSCOPY  10/16/2012    Procedure: COLONOSCOPY;  Colonoscopy, screening;  Surgeon: Reg Feliciano MD;  Location: [facility]     H ABLATION FOCAL AFIB  3/5/2013    PVI     H ABLATION FOCAL AFIB  01/01/2018     HC BONE MARROW/STEM TRANSPLANT ALLOGENIC  5/8/2007     INSERT PORT VASCULAR ACCESS       JOINT REPLACEMTN, KNEE RT/LT  3/28/12    SHAWN     VASECTOMY  1990       FAMILY HISTORY:  Family History   Problem Relation Age of Onset     Hypertension Mother      Cerebrovascular Disease Mother      Arrhythmia Brother      Arrhythmia Sister      Alzheimer Disease Father      Diabetes Paternal Aunt      Gastrointestinal Disease Maternal Grandmother         colitis      Thyroid Disease Sister      Cancer No family hx of      C.A.D. No family hx of      Thyroid Cancer No family hx of        SOCIAL HISTORY:  Social History     Socioeconomic History     Marital status:      Spouse name: Angelica     Number of children: 2     Years of education: 21     Highest education level: Not on file   Social Needs     Financial resource strain: Not on file     Food insecurity - worry: Not on file     Food  insecurity - inability: Not on file     Transportation needs - medical: Not on file     Transportation needs - non-medical: Not on file   Occupational History     Occupation:      Employer: [employer]     Employer: DISABLED   Tobacco Use     Smoking status: Never Smoker     Smokeless tobacco: Never Used   Substance and Sexual Activity     Alcohol use: No     Drug use: No     Sexual activity: Not Currently     Partners: Female   Other Topics Concern     Parent/sibling w/ CABG, MI or angioplasty before 65F 55M? No   Social History Narrative     Not on file       MEDICATIONS:  Current Outpatient Medications   Medication     ARTIFICIAL TEAR SOLUTION OP     CENTRUM OR     flecainide (TAMBOCOR) 50 MG tablet     folic acid (FOLVITE) 1 MG tablet     metoprolol tartrate (LOPRESSOR) 25 MG tablet     pantoprazole (PROTONIX) 20 MG EC tablet     PREVIDENT 5000 DRY MOUTH 1.1 % GEL topical gel     Pseudoeph-Doxylamine-DM-APAP (NYQUIL PO)     simvastatin (ZOCOR) 10 MG tablet     warfarin (COUMADIN) 2.5 MG tablet     No current facility-administered medications for this visit.        ALLERGIES:     Allergies   Allergen Reactions     No Known Drug Allergy        PHYSICAL EXAM:  /75 (BP Location: Right arm, Patient Position: Sitting, Cuff Size: Adult Large)   Pulse 76   Wt 93.4 kg (206 lb)   SpO2 99%   BMI 27.94 kg/m    Gen: alert, interactive, NAD  HEENT: atraumatic, EOMI, MMM  Neck: supple, no JVD  CV: RRR, no m/r/g  Chest: CTAB, no wheezes or crackles  Abd: soft, NT, ND, +BS  Ext: no LE edema, 2+ peripheral pulses  Skin: warm and dry, no rashes on exposed surfaces  Neuro: alert and oriented, no focal deficits    LABS:    CMP  Last Comprehensive Metabolic Panel:  Sodium   Date Value Ref Range Status   11/20/2018 136 133 - 144 mmol/L Final     Potassium   Date Value Ref Range Status   11/20/2018 4.2 3.4 - 5.3 mmol/L Final     Chloride   Date Value Ref Range Status   11/20/2018 104 94 - 109 mmol/L Final     Carbon  Dioxide   Date Value Ref Range Status   11/20/2018 22 20 - 32 mmol/L Final     Anion Gap   Date Value Ref Range Status   11/20/2018 10 3 - 14 mmol/L Final     Glucose   Date Value Ref Range Status   11/20/2018 176 (H) 70 - 99 mg/dL Final     Urea Nitrogen   Date Value Ref Range Status   11/20/2018 25 7 - 30 mg/dL Final     Creatinine   Date Value Ref Range Status   11/20/2018 1.16 0.66 - 1.25 mg/dL Final     GFR Estimate   Date Value Ref Range Status   11/20/2018 62 >60 mL/min/1.7m2 Final     Comment:     Non  GFR Calc     Calcium   Date Value Ref Range Status   11/20/2018 8.2 (L) 8.5 - 10.1 mg/dL Final     Bilirubin Total   Date Value Ref Range Status   11/20/2018 0.9 0.2 - 1.3 mg/dL Final     Alkaline Phosphatase   Date Value Ref Range Status   11/20/2018 120 40 - 150 U/L Final     ALT   Date Value Ref Range Status   11/20/2018 31 0 - 70 U/L Final     AST   Date Value Ref Range Status   11/20/2018 40 0 - 45 U/L Final       CBC  CBC RESULTS:   Recent Labs   Lab Test 11/20/18  1008   WBC 7.9   RBC 5.11   HGB 16.9   HCT 47.4   MCV 93   MCH 33.1*   MCHC 35.7   RDW 12.1   *       TROP  Lab Results   Component Value Date    TROPI 0.025 12/20/2008    TROPONIN 0.05 10/15/2011    TROPONIN 0.08 09/30/2011    TROPONIN 0.00 08/08/2011    TROPONIN 0.00 06/14/2011    TROPONIN 0.01 04/20/2011       LIPIDS  Recent Labs   Lab Test 01/25/18  0738 04/18/16  1659 11/16/15  1155 07/09/14  1511   CHOL 122 124 126 168   HDL 31* 27* 31* 29*   LDL 69 52 71 104   TRIG 111 220* 119 174*   CHOLHDLRATIO  --   --  4.1 5.8*       TSH  TSH   Date Value Ref Range Status   05/22/2018 2.38 0.40 - 4.00 mU/L Final       HBA1C  Lab Results   Component Value Date    A1C 6.4 01/25/2018    A1C 6.7 11/29/2016    A1C 6.6 04/18/2016    A1C 6.6 11/16/2015    A1C 6.3 10/21/2014       CARDIAC DATA:  Personally reviewed recent images including exercise stress test and EKGs.    ASSESSMENT/PLAN:  1.  Paroxysmal atrial fibrillation, status  post circumferential pulmonary vein isolation in 2013 with repeat isolation in 01/2018 - no known recurrence since the second ablation. [id number] score 5.  2.  Paroxysmal atrial tachycardia - refractory to sotalol 80 mg twice daily.   Symptoms resolved and heart rate trends on Apple watch are much improved. Exercise stress test on flecainide with no evidence of VT and no significant QRS widening.  - Continue flecainide 50 mg BID  - Continue metoprolol 25 mg BID  - Continue warfarin with INR goal 2-3  - Continue heart rate monitoring on Apple watch     RTC: 4 months.     Pt seen and discussed with attending, Dr. Matt.    Farrah Steven MD  Cardiology Fellow      I have seen, interviewed, and examined patient. I have reviewed the laboratory tests, imaging, and other investigations. I have reviewed the management plan with the patient. I discussed with the team and agree with the findings and plan in this resident/fellow/nurse practitioner's note. In addition, changes in the physical examination, assessment and plan have been incorporated into the note by myself, as to make it a single cohesive document.       Bekah Matt MD, MS  Cardiology/Cardiac EP Attending Staff

## 2019-01-28 NOTE — PATIENT INSTRUCTIONS
Thank you for coming to the [facility]; please note the following instructions:    1. Follow up in 4 months. please see following pages for appointment detail information.          If you have any questions regarding your visit please contact your care team:     Cardiology  Telephone Number   Tiffany PÉREZ, RN  Ernestina NEFF,RN  Mandi RAMIREZ, QASIM NEFF, MA  Kevin RUFFIN, PERIN   [phone]    *After hours: [phone]   For scheduling appts:     [phone] or    [phone] (select option 1)    *After hours: [phone]     For the Device Clinic (Pacemakers and ICD's)  RN's :  Chelsey Carrera   During business hours: [phone]    *After business hours:  [phone] (select option 4)      Normal test result notifications will be released via [employer] or mailed within 7 business days.  All other test results, will be communicated via telephone once reviewed by your cardiologist.    If you need a medication refill please contact your pharmacy.  Please allow 3 business days for your refill to be completed.    As always, thank you for trusting us with your health care needs!

## 2019-01-28 NOTE — LETTER
1/28/2019      RE: Haresh Rock  [address] [place] [address]       Dear Colleague,    Thank you for the opportunity to participate in the care of your patient, Haresh Rock, at the [facility] AT [facility] at [facility]. Please see a copy of my visit note below.    ELECTROPHYSIOLOGY SPECIALTY CONSULTATION      01/28/2019    REASON FOR VISIT:  The patient comes back for followup of paroxysmal atrial fibrillation and atrial tachycardia.      HISTORY OF PRESENT ILLNESS:  The patient is a 68-year-old gentleman with a history of paroxysmal atrial fibrillation and atrial tachycardia.  He is status post circumferential pulmonary vein isolation for paroxysmal atrial fibrillation in 03/2013, which provided him with an arrhythmia period lasting for about 5 years. Towards the end of 2017, he had recurrent atrial fibrillation and underwent re-isolation of pulmonary veins in 01/2018.  During that ablation, the patient was also found to have a right atrial tachycardia for which he underwent an ablation.  Of note, during that ablation an atypical atrial flutter was also noted which kept degenerating to atrial fibrillation and was not mappable.In 09/2018 and his sotalol dose of 40 mg twice daily was increased to 80 mg twice daily. Despite the increase, a Zio Patch done in September and 10/2018 showed 55 episodes of atrial tachycardia, the longest one lasting for several hours. The patient was not symptomatic for all of the episodes. At his last appointment in October 2018, sotalol was discontinued, and patient was started on metoprolol and then flecainide was initiated.     Interval History:  Patient reports he has felt great lately with no acute issues and reports his heart rate trend on his Apple watch is much improved. Pt denies any symptoms of dyspnea, exertional angina, palpitations, dizziness, lightheadedness, presyncope or  syncope.    ROS:  A complete 10-point ROS was negative except as above.    PAST MEDICAL HISTORY:  Past Medical History:   Diagnosis Date     Antiphospholipid antibody syndrome (H) 2005    Ravi's     Atrial fibrillation (H) 4/2011     Cirrhosis (H)      CKD (chronic kidney disease) stage 1, GFR 90 ml/min or greater      CKD (chronic kidney disease) stage 2, GFR 60-89 ml/min      Diabetes mellitus type II     steroid induced     DJD (degenerative joint disease) of knee     SHAWN, worse on right     DVT (deep venous thrombosis) (H)      ED (erectile dysfunction)      Gallbladder polyp 5/2010     Rtpzb-Xkiagg-Mcvk Disease 2007    BMT     Hemochromatosis      Hodgkin's disease NOS     5 cycles of ABVD in 2011     HTN (hypertension)      Hyperlipidaemia LDL goal <100      Myeloproliferative disorder (H)     atypical, [facility]     PE (pulmonary embolism) 11/2004     Polyneuropathy      Primary pulmonary hypertension (H)        PAST SURGICAL HISTORY:  Past Surgical History:   Procedure Laterality Date     ANESTHESIA CARDIOVERSION  4/21/2011    Procedure:ANESTHESIA CARDIOVERSION; Surgeon:GENERIC ANESTHESIA PROVIDER; Location:[facility]     ARTHROSCOPY KNEE RT/LT      LT     CATARACT IOL, RT/LT       COLONOSCOPY  10/16/2012    Procedure: COLONOSCOPY;  Colonoscopy, screening;  Surgeon: Reg Feliciano MD;  Location: [facility] OR     H ABLATION FOCAL AFIB  3/5/2013    PVI     H ABLATION FOCAL AFIB  01/01/2018     HC BONE MARROW/STEM TRANSPLANT ALLOGENIC  5/8/2007     INSERT PORT VASCULAR ACCESS       JOINT REPLACEMTN, KNEE RT/LT  3/28/12    SHAWN     VASECTOMY  1990       FAMILY HISTORY:  Family History   Problem Relation Age of Onset     Hypertension Mother      Cerebrovascular Disease Mother      Arrhythmia Brother      Arrhythmia Sister      Alzheimer Disease Father      Diabetes Paternal Aunt      Gastrointestinal Disease Maternal Grandmother         colitis      Thyroid Disease Sister      Cancer No family hx of      C.A.D. No family hx  of      Thyroid Cancer No family hx of        SOCIAL HISTORY:  Social History     Socioeconomic History     Marital status:      Spouse name: Angelica     Number of children: 2     Years of education: 21     Highest education level: Not on file   Social Needs     Financial resource strain: Not on file     Food insecurity - worry: Not on file     Food insecurity - inability: Not on file     Transportation needs - medical: Not on file     Transportation needs - non-medical: Not on file   Occupational History     Occupation:      Employer: [employer]     Employer: [employer]   Tobacco Use     Smoking status: Never Smoker     Smokeless tobacco: Never Used   Substance and Sexual Activity     Alcohol use: No     Drug use: No     Sexual activity: Not Currently     Partners: Female   Other Topics Concern     Parent/sibling w/ CABG, MI or angioplasty before 65F 55M? No   Social History Narrative     Not on file       MEDICATIONS:  Current Outpatient Medications   Medication     ARTIFICIAL TEAR SOLUTION OP     CENTRUM OR     flecainide (TAMBOCOR) 50 MG tablet     folic acid (FOLVITE) 1 MG tablet     metoprolol tartrate (LOPRESSOR) 25 MG tablet     pantoprazole (PROTONIX) 20 MG EC tablet     PREVIDENT 5000 DRY MOUTH 1.1 % GEL topical gel     Pseudoeph-Doxylamine-DM-APAP (NYQUIL PO)     simvastatin (ZOCOR) 10 MG tablet     warfarin (COUMADIN) 2.5 MG tablet     No current facility-administered medications for this visit.        ALLERGIES:     Allergies   Allergen Reactions     No Known Drug Allergy        PHYSICAL EXAM:  /75 (BP Location: Right arm, Patient Position: Sitting, Cuff Size: Adult Large)   Pulse 76   Wt 93.4 kg (206 lb)   SpO2 99%   BMI 27.94 kg/m     Gen: alert, interactive, NAD  HEENT: atraumatic, EOMI, MMM  Neck: supple, no JVD  CV: RRR, no m/r/g  Chest: CTAB, no wheezes or crackles  Abd: soft, NT, ND, +BS  Ext: no LE edema, 2+ peripheral pulses  Skin: warm and dry, no rashes on exposed  surfaces  Neuro: alert and oriented, no focal deficits    LABS:    CMP  Last Comprehensive Metabolic Panel:  Sodium   Date Value Ref Range Status   11/20/2018 136 133 - 144 mmol/L Final     Potassium   Date Value Ref Range Status   11/20/2018 4.2 3.4 - 5.3 mmol/L Final     Chloride   Date Value Ref Range Status   11/20/2018 104 94 - 109 mmol/L Final     Carbon Dioxide   Date Value Ref Range Status   11/20/2018 22 20 - 32 mmol/L Final     Anion Gap   Date Value Ref Range Status   11/20/2018 10 3 - 14 mmol/L Final     Glucose   Date Value Ref Range Status   11/20/2018 176 (H) 70 - 99 mg/dL Final     Urea Nitrogen   Date Value Ref Range Status   11/20/2018 25 7 - 30 mg/dL Final     Creatinine   Date Value Ref Range Status   11/20/2018 1.16 0.66 - 1.25 mg/dL Final     GFR Estimate   Date Value Ref Range Status   11/20/2018 62 >60 mL/min/1.7m2 Final     Comment:     Non  GFR Calc     Calcium   Date Value Ref Range Status   11/20/2018 8.2 (L) 8.5 - 10.1 mg/dL Final     Bilirubin Total   Date Value Ref Range Status   11/20/2018 0.9 0.2 - 1.3 mg/dL Final     Alkaline Phosphatase   Date Value Ref Range Status   11/20/2018 120 40 - 150 U/L Final     ALT   Date Value Ref Range Status   11/20/2018 31 0 - 70 U/L Final     AST   Date Value Ref Range Status   11/20/2018 40 0 - 45 U/L Final       CBC  CBC RESULTS:   Recent Labs   Lab Test 11/20/18  1008   WBC 7.9   RBC 5.11   HGB 16.9   HCT 47.4   MCV 93   MCH 33.1*   MCHC 35.7   RDW 12.1   *       TROP  Lab Results   Component Value Date    TROPI 0.025 12/20/2008    TROPONIN 0.05 10/15/2011    TROPONIN 0.08 09/30/2011    TROPONIN 0.00 08/08/2011    TROPONIN 0.00 06/14/2011    TROPONIN 0.01 04/20/2011       LIPIDS  Recent Labs   Lab Test 01/25/18  0738 04/18/16  1659 11/16/15  1155 07/09/14  1511   CHOL 122 124 126 168   HDL 31* 27* 31* 29*   LDL 69 52 71 104   TRIG 111 220* 119 174*   CHOLHDLRATIO  --   --  4.1 5.8*       TSH  TSH   Date Value Ref Range  Status   05/22/2018 2.38 0.40 - 4.00 mU/L Final       HBA1C  Lab Results   Component Value Date    A1C 6.4 01/25/2018    A1C 6.7 11/29/2016    A1C 6.6 04/18/2016    A1C 6.6 11/16/2015    A1C 6.3 10/21/2014       CARDIAC DATA:  Personally reviewed recent images including exercise stress test and EKGs.    ASSESSMENT/PLAN:  1.  Paroxysmal atrial fibrillation, status post circumferential pulmonary vein isolation in 2013 with repeat isolation in 01/2018 - no known recurrence since the second ablation. [id number] score 5.  2.  Paroxysmal atrial tachycardia - refractory to sotalol 80 mg twice daily.   Symptoms resolved and heart rate trends on Apple watch are much improved. Exercise stress test on flecainide with no evidence of VT and no significant QRS widening.  - Continue flecainide 50 mg BID  - Continue metoprolol 25 mg BID  - Continue warfarin with INR goal 2-3  - Continue heart rate monitoring on Apple watch     RTC: 4 months.     Pt seen and discussed with attending, Dr. Matt.    Farrah Steven MD  Cardiology Fellow      I have seen, interviewed, and examined patient. I have reviewed the laboratory tests, imaging, and other investigations. I have reviewed the management plan with the patient. I discussed with the team and agree with the findings and plan in this resident/fellow/nurse practitioner's note. In addition, changes in the physical examination, assessment and plan have been incorporated into the note by myself, as to make it a single cohesive document.       Bekah Matt MD, MS  Cardiology/Cardiac EP Attending Staff

## 2019-01-28 NOTE — NURSING NOTE
Chief Complaint   Patient presents with     Atrial Fib     Paroxysmal atrial fibrillation , 3 to 4 mo. Rtc. , apple watch monitoring with Flecanide-Per patient occational tiredness.       Initial /75 (BP Location: Right arm, Patient Position: Sitting, Cuff Size: Adult Large)   Pulse 76   Wt 93.4 kg (206 lb)   SpO2 99%   BMI 27.94 kg/m   Estimated body mass index is 27.94 kg/m  as calculated from the following:    Height as of 1/24/18: 1.829 m (6').    Weight as of this encounter: 93.4 kg (206 lb)..  BP completed using cuff size: large    Kevin De La Cruz L.P.N.

## 2019-02-02 DIAGNOSIS — E78.5 HYPERLIPIDEMIA WITH TARGET LDL LESS THAN 100: ICD-10-CM

## 2019-02-04 RX ORDER — SIMVASTATIN 10 MG
TABLET ORAL
Qty: 30 TABLET | Refills: 0 | Status: SHIPPED | OUTPATIENT
Start: 2019-02-04 | End: 2019-02-26

## 2019-02-04 NOTE — TELEPHONE ENCOUNTER
"Routing refill request to provider for review/approval because:  Labs not current:  LDL    Requested Prescriptions   Pending Prescriptions Disp Refills     simvastatin (ZOCOR) 10 MG tablet [Pharmacy Med Name: SIMVASTATIN 10 MG TABLET] 90 tablet 3     Sig: TAKE 1 TABLET (10 MG) BY MOUTH AT BEDTIME    Statins Protocol Failed - 2/4/2019 11:09 AM       Failed - LDL on file in past 12 months    Recent Labs   Lab Test 01/25/18  0738   LDL 69            Passed - No abnormal creatine kinase in past 12 months    No lab results found.            Passed - Recent (12 mo) or future (30 days) visit within the authorizing provider's specialty    Patient had office visit in the last 12 months or has a visit in the next 30 days with authorizing provider or within the authorizing provider's specialty.  See \"Patient Info\" tab in inbasket, or \"Choose Columns\" in Meds & Orders section of the refill encounter.             Passed - Medication is active on med list       Passed - Patient is age 18 or older        Pily Brooke RN  "

## 2019-02-05 ENCOUNTER — TRANSFERRED RECORDS (OUTPATIENT)
Dept: HEALTH INFORMATION MANAGEMENT | Facility: CLINIC | Age: 69
End: 2019-02-05

## 2019-02-13 ENCOUNTER — ANTICOAGULATION THERAPY VISIT (OUTPATIENT)
Dept: FAMILY MEDICINE | Facility: CLINIC | Age: 69
End: 2019-02-13

## 2019-02-13 DIAGNOSIS — Z79.01 LONG TERM CURRENT USE OF ANTICOAGULANT THERAPY: ICD-10-CM

## 2019-02-13 DIAGNOSIS — C81.99 HODGKIN'S DISEASE OF EXTRANODAL OR SOLID ORGAN SITE (H): ICD-10-CM

## 2019-02-13 DIAGNOSIS — I48.0 PAROXYSMAL ATRIAL FIBRILLATION (H): ICD-10-CM

## 2019-02-13 DIAGNOSIS — E04.1 THYROID NODULE: ICD-10-CM

## 2019-02-13 DIAGNOSIS — E83.110 HEREDITARY HEMOCHROMATOSIS (H): ICD-10-CM

## 2019-02-13 LAB
ALBUMIN SERPL-MCNC: 3.8 G/DL (ref 3.4–5)
ALP SERPL-CCNC: 126 U/L (ref 40–150)
ALT SERPL W P-5'-P-CCNC: 25 U/L (ref 0–70)
ANION GAP SERPL CALCULATED.3IONS-SCNC: 5 MMOL/L (ref 3–14)
AST SERPL W P-5'-P-CCNC: 31 U/L (ref 0–45)
BASOPHILS # BLD AUTO: 0 10E9/L (ref 0–0.2)
BASOPHILS NFR BLD AUTO: 0.3 %
BILIRUB SERPL-MCNC: 1 MG/DL (ref 0.2–1.3)
BUN SERPL-MCNC: 25 MG/DL (ref 7–30)
CALCIUM SERPL-MCNC: 8.5 MG/DL (ref 8.5–10.1)
CHLORIDE SERPL-SCNC: 104 MMOL/L (ref 94–109)
CO2 SERPL-SCNC: 29 MMOL/L (ref 20–32)
CREAT SERPL-MCNC: 1.32 MG/DL (ref 0.66–1.25)
DIFFERENTIAL METHOD BLD: ABNORMAL
EOSINOPHIL # BLD AUTO: 0.1 10E9/L (ref 0–0.7)
EOSINOPHIL NFR BLD AUTO: 1.7 %
ERYTHROCYTE [DISTWIDTH] IN BLOOD BY AUTOMATED COUNT: 12.8 % (ref 10–15)
FERRITIN SERPL-MCNC: 154 NG/ML (ref 26–388)
GFR SERPL CREATININE-BSD FRML MDRD: 54 ML/MIN/{1.73_M2}
GLUCOSE SERPL-MCNC: 232 MG/DL (ref 70–99)
HCT VFR BLD AUTO: 50.1 % (ref 40–53)
HGB BLD-MCNC: 17.7 G/DL (ref 13.3–17.7)
INR PPP: 2.8 (ref 0.86–1.14)
IRON SATN MFR SERPL: 82 % (ref 15–46)
IRON SERPL-MCNC: 209 UG/DL (ref 35–180)
LYMPHOCYTES # BLD AUTO: 2.9 10E9/L (ref 0.8–5.3)
LYMPHOCYTES NFR BLD AUTO: 38 %
MCH RBC QN AUTO: 33.3 PG (ref 26.5–33)
MCHC RBC AUTO-ENTMCNC: 35.3 G/DL (ref 31.5–36.5)
MCV RBC AUTO: 94 FL (ref 78–100)
MONOCYTES # BLD AUTO: 0.7 10E9/L (ref 0–1.3)
MONOCYTES NFR BLD AUTO: 9.7 %
NEUTROPHILS # BLD AUTO: 3.8 10E9/L (ref 1.6–8.3)
NEUTROPHILS NFR BLD AUTO: 50.3 %
PLATELET # BLD AUTO: 118 10E9/L (ref 150–450)
POTASSIUM SERPL-SCNC: 4.4 MMOL/L (ref 3.4–5.3)
PROT SERPL-MCNC: 7.7 G/DL (ref 6.8–8.8)
RBC # BLD AUTO: 5.31 10E12/L (ref 4.4–5.9)
SODIUM SERPL-SCNC: 138 MMOL/L (ref 133–144)
TIBC SERPL-MCNC: 255 UG/DL (ref 240–430)
WBC # BLD AUTO: 7.6 10E9/L (ref 4–11)

## 2019-02-13 PROCEDURE — 83540 ASSAY OF IRON: CPT | Performed by: INTERNAL MEDICINE

## 2019-02-13 PROCEDURE — 80053 COMPREHEN METABOLIC PANEL: CPT | Performed by: INTERNAL MEDICINE

## 2019-02-13 PROCEDURE — 83550 IRON BINDING TEST: CPT | Performed by: INTERNAL MEDICINE

## 2019-02-13 PROCEDURE — 82728 ASSAY OF FERRITIN: CPT | Performed by: INTERNAL MEDICINE

## 2019-02-13 PROCEDURE — 85025 COMPLETE CBC W/AUTO DIFF WBC: CPT | Performed by: INTERNAL MEDICINE

## 2019-02-13 PROCEDURE — 99207 ZZC NO CHARGE NURSE ONLY: CPT | Performed by: FAMILY MEDICINE

## 2019-02-13 PROCEDURE — 36415 COLL VENOUS BLD VENIPUNCTURE: CPT | Performed by: INTERNAL MEDICINE

## 2019-02-13 PROCEDURE — 85610 PROTHROMBIN TIME: CPT | Performed by: INTERNAL MEDICINE

## 2019-02-22 DIAGNOSIS — D89.811 GRAFT-VERSUS-HOST DISEASE, CHRONIC (H): ICD-10-CM

## 2019-02-22 DIAGNOSIS — C81.99 HODGKIN'S DISEASE OF EXTRANODAL OR SOLID ORGAN SITE (H): ICD-10-CM

## 2019-02-22 RX ORDER — PANTOPRAZOLE SODIUM 20 MG/1
20 TABLET, DELAYED RELEASE ORAL AT BEDTIME
Qty: 90 TABLET | Refills: 3 | Status: SHIPPED | OUTPATIENT
Start: 2019-02-22 | End: 2020-02-06

## 2019-02-22 NOTE — PROGRESS NOTES
ANTICOAGULATION FOLLOW-UP CLINIC VISIT    Patient Name:  Haresh Rock  Date:  2019  Contact Type:  Telephone    SUBJECTIVE:     Patient Findings     Positives:   No Problem Findings    Comments:   Bleeding Signs/Symptoms: NO  Thromboembolic Signs/Symptoms: NO     Medication Changes:  NO  Dietary Changes:  NO  Bacterial/Viral Infection: NO     Missed Coumadin Doses: NO  Other Concerns:  NO             OBJECTIVE    INR   Date Value Ref Range Status   2019 2.80 (H) 0.86 - 1.14 Final     Factor 2 Assay   Date Value Ref Range Status   2007 58 (L) 60 - 140 % Final     Comment:     (Note)  CALLED TO TAMARA(INTERV. RADIOLOGY)[id number],        ASSESSMENT / PLAN  No question data found.  Anticoagulation Summary  As of 2019    INR goal:   2.0-3.0   TTR:   --   INR used for dosin.80 (2019)   Warfarin maintenance plan:   1.25 mg (2.5 mg x 0.5) every Mon, Fri; 2.5 mg (2.5 mg x 1) all other days   Full warfarin instructions:   1.25 mg every Mon, Fri; 2.5 mg all other days   Weekly warfarin total:   15 mg   No change documented:   Laverne Ferguson RN   Plan last modified:   Laverne Ferguson RN (2018)   Next INR check:   3/27/2019   Priority:   INR   Target end date:   Indefinite    Indications    Long-term (current) use of anticoagulants [Z79.01] [Z79.01]  Atrial fibrillation (H) [I48.91]             Anticoagulation Episode Summary     INR check location:       Preferred lab:       Send INR reminders to:   [facility]    Comments:         Anticoagulation Care Providers     Provider Role Specialty Phone number    Anya Nowak MD [facility] [phone]            See the Encounter Report to view Anticoagulation Flowsheet and Dosing Calendar (Go to Encounters tab in chart review, and find the Anticoagulation Therapy Visit)        Laverne Ferguson RN

## 2019-02-26 ENCOUNTER — TELEPHONE (OUTPATIENT)
Dept: FAMILY MEDICINE | Facility: CLINIC | Age: 69
End: 2019-02-26

## 2019-02-26 DIAGNOSIS — E78.5 HYPERLIPIDEMIA WITH TARGET LDL LESS THAN 100: ICD-10-CM

## 2019-02-26 DIAGNOSIS — D68.61 ANTIPHOSPHOLIPID ANTIBODY SYNDROME (H): ICD-10-CM

## 2019-02-26 DIAGNOSIS — I48.91 ATRIAL FIBRILLATION, UNSPECIFIED TYPE (H): ICD-10-CM

## 2019-02-26 RX ORDER — SIMVASTATIN 10 MG
10 TABLET ORAL DAILY
Qty: 90 TABLET | Refills: 0 | Status: SHIPPED | OUTPATIENT
Start: 2019-02-26 | End: 2019-04-22

## 2019-02-26 NOTE — TELEPHONE ENCOUNTER
Routing refill request to provider for review/approval because:  Patient was given karen refill and is scheduled for office visit on 4/23/19  Is it okay for 2 months supply until scheduled visit?      Dasha Colmenares RN

## 2019-02-26 NOTE — TELEPHONE ENCOUNTER
Called patient left message with wife that prescription was sent to pharmacy.  Zayra Manjarrez,

## 2019-02-26 NOTE — TELEPHONE ENCOUNTER
Signed Prescriptions:                        Disp   Refills    simvastatin (ZOCOR) 10 MG tablet           90 tab*0        Sig: Take 1 tablet (10 mg) by mouth daily . No further           refills until follow-up appointment  Authorizing Provider: PROMISE FENTON

## 2019-02-26 NOTE — TELEPHONE ENCOUNTER
Reason for Call:  Other prescription    Detailed comments: Patient has been scheduled via [employer] a Medicare wellness visit for your first available. Patient will need an extension on his simvastatin. Please call patient to advise.    Phone Number Patient can be reached at: Home number on file [phone] (home)    Best Time: any    Can we leave a detailed message on this number? YES    Call taken on 2/26/2019 at 7:42 AM by Jackie Yeh

## 2019-02-27 RX ORDER — WARFARIN SODIUM 2.5 MG/1
TABLET ORAL
Qty: 90 TABLET | Refills: 0 | Status: SHIPPED | OUTPATIENT
Start: 2019-02-27 | End: 2019-05-27

## 2019-02-27 NOTE — TELEPHONE ENCOUNTER
"Prescription approved per [facility].    Requested Prescriptions   Signed Prescriptions Disp Refills     warfarin (COUMADIN) 2.5 MG tablet 90 tablet 0     Sig: Take 1.25mg (1/2 tablet) on Mondays and Fridays, take 2.5mg (1 tablet) all other days or as directed by coumadin clinic    Vitamin K Antagonists Failed - 2/26/2019  6:31 PM       Failed - Recent (12 mo) or future (30 days) visit within the authorizing provider's specialty    Patient had office visit in the last 12 months or has a visit in the next 30 days with authorizing provider or within the authorizing provider's specialty.  See \"Patient Info\" tab in inbasket, or \"Choose Columns\" in Meds & Orders section of the refill encounter.             Failed - INR is within goal in the past 6 weeks    Confirm INR is within goal in the past 6 weeks.     Recent Labs   Lab Test 02/13/19  1034   INR 2.80*                      Passed - Medication is active on med list       Passed - Patient is 18 years of age or older        Mayi Gagnon RN  [facility] [place]    "

## 2019-03-06 ENCOUNTER — MYC MEDICAL ADVICE (OUTPATIENT)
Dept: CARDIOLOGY | Facility: CLINIC | Age: 69
End: 2019-03-06

## 2019-03-06 DIAGNOSIS — I47.10 PAROXYSMAL SUPRAVENTRICULAR TACHYCARDIA (H): ICD-10-CM

## 2019-03-06 DIAGNOSIS — I48.0 PAROXYSMAL ATRIAL FIBRILLATION (H): ICD-10-CM

## 2019-03-07 RX ORDER — METOPROLOL TARTRATE 25 MG/1
25 TABLET, FILM COATED ORAL 2 TIMES DAILY
Qty: 180 TABLET | Refills: 1 | Status: SHIPPED | OUTPATIENT
Start: 2019-03-07 | End: 2019-11-15

## 2019-03-07 RX ORDER — FLECAINIDE ACETATE 50 MG/1
50 TABLET ORAL 2 TIMES DAILY
Qty: 180 TABLET | Refills: 1 | Status: SHIPPED | OUTPATIENT
Start: 2019-03-07 | End: 2019-09-27

## 2019-04-02 ENCOUNTER — ANTICOAGULATION THERAPY VISIT (OUTPATIENT)
Dept: NURSING | Facility: CLINIC | Age: 69
End: 2019-04-02
Payer: COMMERCIAL

## 2019-04-02 DIAGNOSIS — I48.0 PAROXYSMAL ATRIAL FIBRILLATION (H): ICD-10-CM

## 2019-04-02 LAB — INR POINT OF CARE: 2.4 (ref 0.86–1.14)

## 2019-04-02 PROCEDURE — 99207 ZZC NO CHARGE NURSE ONLY: CPT

## 2019-04-02 PROCEDURE — 85610 PROTHROMBIN TIME: CPT | Mod: QW

## 2019-04-02 PROCEDURE — 36416 COLLJ CAPILLARY BLOOD SPEC: CPT

## 2019-04-02 NOTE — PROGRESS NOTES
ANTICOAGULATION FOLLOW-UP CLINIC VISIT    Patient Name:  Haresh Rock  Date:  2019  Contact Type:  Face to Face    SUBJECTIVE:     Patient Findings     Comments:   Bleeding Signs/Symptoms: NO  Thromboembolic Signs/Symptoms: NO     Medication Changes:  NO  Dietary Changes:  NO  Bacterial/Viral Infection: NO     Missed Coumadin Doses: NO  Other Concerns:  NO             OBJECTIVE    INR Protime   Date Value Ref Range Status   2019 2.4 (A) 0.86 - 1.14 Final     Factor 2 Assay   Date Value Ref Range Status   2007 58 (L) 60 - 140 % Final     Comment:     (Note)  CALLED TO TAMARA(INTERV. RADIOLOGY)[id number],        ASSESSMENT / PLAN  No question data found.  Anticoagulation Summary  As of 2019    INR goal:   2.0-3.0   TTR:   78.5 % (2.9 y)   INR used for dosin.4 (2019)   Warfarin maintenance plan:   1.25 mg (2.5 mg x 0.5) every Mon, Fri; 2.5 mg (2.5 mg x 1) all other days   Full warfarin instructions:   1.25 mg every Mon, Fri; 2.5 mg all other days   Weekly warfarin total:   15 mg   No change documented:   Laverne Ferguson RN   Plan last modified:   Laverne Ferguson RN (2018)   Next INR check:   2019   Priority:   INR   Target end date:   Indefinite    Indications    Long-term (current) use of anticoagulants [Z79.01] [Z79.01]  Atrial fibrillation (H) [I48.91]             Anticoagulation Episode Summary     INR check location:       Preferred lab:       Send INR reminders to:    [facility]    Comments:         Anticoagulation Care Providers     Provider Role Specialty Phone number    Anya Nowak MD [facility] [phone]            See the Encounter Report to view Anticoagulation Flowsheet and Dosing Calendar (Go to Encounters tab in chart review, and find the Anticoagulation Therapy Visit)        Laverne Ferguson RN

## 2019-04-02 NOTE — PATIENT INSTRUCTIONS
[facility]:  Please call [phone] to cancel and/or reschedule your appointment   Please call [phone] with any problems or questions regarding your Warfarin therapy.

## 2019-04-05 DIAGNOSIS — D89.811 GRAFT-VERSUS-HOST DISEASE, CHRONIC (H): ICD-10-CM

## 2019-04-05 DIAGNOSIS — C85.90 LYMPHOMA, UNSPECIFIED BODY REGION, UNSPECIFIED LYMPHOMA TYPE (H): ICD-10-CM

## 2019-04-05 RX ORDER — FOLIC ACID 1 MG/1
1000 TABLET ORAL DAILY
Qty: 90 TABLET | Refills: 3 | Status: SHIPPED | OUTPATIENT
Start: 2019-04-05 | End: 2020-04-02

## 2019-04-22 ASSESSMENT — ENCOUNTER SYMPTOMS
DIZZINESS: 0
JOINT SWELLING: 0
HEMATOCHEZIA: 0
WEAKNESS: 0
COUGH: 0
EYE PAIN: 0
SHORTNESS OF BREATH: 0
ARTHRALGIAS: 0
NERVOUS/ANXIOUS: 0
NAUSEA: 0
FREQUENCY: 0
CONSTIPATION: 0
CHILLS: 0
DIARRHEA: 0
HEADACHES: 0
HEMATURIA: 0
PARESTHESIAS: 0
DYSURIA: 0
MYALGIAS: 0
FEVER: 0
HEARTBURN: 0
SORE THROAT: 0
ABDOMINAL PAIN: 0
PALPITATIONS: 0

## 2019-04-22 ASSESSMENT — ACTIVITIES OF DAILY LIVING (ADL): CURRENT_FUNCTION: NO ASSISTANCE NEEDED

## 2019-04-23 ENCOUNTER — OFFICE VISIT (OUTPATIENT)
Dept: FAMILY MEDICINE | Facility: CLINIC | Age: 69
End: 2019-04-23
Payer: COMMERCIAL

## 2019-04-23 VITALS
DIASTOLIC BLOOD PRESSURE: 60 MMHG | HEIGHT: 72 IN | RESPIRATION RATE: 18 BRPM | BODY MASS INDEX: 27.9 KG/M2 | SYSTOLIC BLOOD PRESSURE: 110 MMHG | WEIGHT: 206 LBS | OXYGEN SATURATION: 99 % | TEMPERATURE: 97 F | HEART RATE: 73 BPM

## 2019-04-23 DIAGNOSIS — E11.22 TYPE 2 DIABETES MELLITUS WITH STAGE 2 CHRONIC KIDNEY DISEASE, WITHOUT LONG-TERM CURRENT USE OF INSULIN (H): ICD-10-CM

## 2019-04-23 DIAGNOSIS — I27.0 PRIMARY PULMONARY HYPERTENSION (H): ICD-10-CM

## 2019-04-23 DIAGNOSIS — D68.61 ANTIPHOSPHOLIPID ANTIBODY SYNDROME (H): ICD-10-CM

## 2019-04-23 DIAGNOSIS — Z94.81 STATUS POST BONE MARROW TRANSPLANT (H): ICD-10-CM

## 2019-04-23 DIAGNOSIS — I12.9 RENAL HYPERTENSION: ICD-10-CM

## 2019-04-23 DIAGNOSIS — Z00.00 ENCOUNTER FOR MEDICARE ANNUAL WELLNESS EXAM: Primary | ICD-10-CM

## 2019-04-23 DIAGNOSIS — E83.110 HEREDITARY HEMOCHROMATOSIS (H): ICD-10-CM

## 2019-04-23 DIAGNOSIS — I47.10 PAROXYSMAL SUPRAVENTRICULAR TACHYCARDIA (H): ICD-10-CM

## 2019-04-23 DIAGNOSIS — E78.5 HYPERLIPIDEMIA WITH TARGET LDL LESS THAN 100: ICD-10-CM

## 2019-04-23 DIAGNOSIS — N18.2 TYPE 2 DIABETES MELLITUS WITH STAGE 2 CHRONIC KIDNEY DISEASE, WITHOUT LONG-TERM CURRENT USE OF INSULIN (H): ICD-10-CM

## 2019-04-23 DIAGNOSIS — I48.0 PAROXYSMAL ATRIAL FIBRILLATION (H): ICD-10-CM

## 2019-04-23 DIAGNOSIS — G62.9 POLYNEUROPATHY: ICD-10-CM

## 2019-04-23 LAB
ALBUMIN UR-MCNC: ABNORMAL MG/DL
APPEARANCE UR: CLEAR
BILIRUB UR QL STRIP: NEGATIVE
CHOLEST SERPL-MCNC: 128 MG/DL
COLOR UR AUTO: YELLOW
CREAT UR-MCNC: 326 MG/DL
GLUCOSE UR STRIP-MCNC: NEGATIVE MG/DL
HBA1C MFR BLD: 6.9 % (ref 0–5.6)
HDLC SERPL-MCNC: 31 MG/DL
HGB UR QL STRIP: NEGATIVE
KETONES UR STRIP-MCNC: ABNORMAL MG/DL
LDLC SERPL CALC-MCNC: 68 MG/DL
LEUKOCYTE ESTERASE UR QL STRIP: NEGATIVE
MICROALBUMIN UR-MCNC: 55 MG/L
MICROALBUMIN/CREAT UR: 16.78 MG/G CR (ref 0–17)
NITRATE UR QL: NEGATIVE
NONHDLC SERPL-MCNC: 97 MG/DL
PH UR STRIP: 5.5 PH (ref 5–7)
RBC #/AREA URNS AUTO: NORMAL /HPF
SOURCE: ABNORMAL
SP GR UR STRIP: >1.03 (ref 1–1.03)
TRIGL SERPL-MCNC: 143 MG/DL
UROBILINOGEN UR STRIP-ACNC: 0.2 EU/DL (ref 0.2–1)
WBC #/AREA URNS AUTO: NORMAL /HPF

## 2019-04-23 PROCEDURE — 36415 COLL VENOUS BLD VENIPUNCTURE: CPT | Performed by: FAMILY MEDICINE

## 2019-04-23 PROCEDURE — 83036 HEMOGLOBIN GLYCOSYLATED A1C: CPT | Performed by: FAMILY MEDICINE

## 2019-04-23 PROCEDURE — 99207 C FOOT EXAM  NO CHARGE: CPT | Performed by: FAMILY MEDICINE

## 2019-04-23 PROCEDURE — G0439 PPPS, SUBSEQ VISIT: HCPCS | Performed by: FAMILY MEDICINE

## 2019-04-23 PROCEDURE — 81001 URINALYSIS AUTO W/SCOPE: CPT | Performed by: FAMILY MEDICINE

## 2019-04-23 PROCEDURE — 80061 LIPID PANEL: CPT | Performed by: FAMILY MEDICINE

## 2019-04-23 PROCEDURE — 99213 OFFICE O/P EST LOW 20 MIN: CPT | Mod: 25 | Performed by: FAMILY MEDICINE

## 2019-04-23 PROCEDURE — 82043 UR ALBUMIN QUANTITATIVE: CPT | Performed by: FAMILY MEDICINE

## 2019-04-23 RX ORDER — SIMVASTATIN 10 MG
10 TABLET ORAL DAILY
Qty: 90 TABLET | Refills: 3 | Status: SHIPPED | OUTPATIENT
Start: 2019-04-23 | End: 2020-05-08

## 2019-04-23 RX ORDER — METOPROLOL TARTRATE 25 MG/1
25 TABLET, FILM COATED ORAL 2 TIMES DAILY
Qty: 180 TABLET | Refills: 3 | Status: CANCELLED | OUTPATIENT
Start: 2019-04-23

## 2019-04-23 ASSESSMENT — ENCOUNTER SYMPTOMS
DYSURIA: 0
PARESTHESIAS: 0
CHILLS: 0
EYE PAIN: 0
SORE THROAT: 0
HEADACHES: 0
NERVOUS/ANXIOUS: 0
HEMATOCHEZIA: 0
DIZZINESS: 0
JOINT SWELLING: 0
HEARTBURN: 0
DIARRHEA: 0
SHORTNESS OF BREATH: 0
FEVER: 0
COUGH: 0
CONSTIPATION: 0
ARTHRALGIAS: 0
NAUSEA: 0
ABDOMINAL PAIN: 0
HEMATURIA: 0
MYALGIAS: 0
WEAKNESS: 0
FREQUENCY: 0
PALPITATIONS: 0

## 2019-04-23 ASSESSMENT — MIFFLIN-ST. JEOR: SCORE: 1724.47

## 2019-04-23 ASSESSMENT — ACTIVITIES OF DAILY LIVING (ADL): CURRENT_FUNCTION: NO ASSISTANCE NEEDED

## 2019-04-23 NOTE — PATIENT INSTRUCTIONS
Patient Education   Personalized Prevention Plan  You are due for the preventive services outlined below.  Your care team is available to assist you in scheduling these services.  If you have already completed any of these items, please share that information with your care team to update in your medical record.  Health Maintenance Due   Topic Date Due     Diabetic Foot Exam - yearly  03/19/2014     Zoster (Shingles) Vaccine (2 of 3) 11/14/2016     Annual Wellness Visit  09/19/2017     Eye Exam - yearly  01/17/2019     FALL RISK ASSESSMENT  01/24/2019     Microalbumin Lab - yearly  01/24/2019     Cholesterol Lab - yearly  01/25/2019     INR CLINIC REFERRAL - yearly  04/12/2019       Exercise for a Healthier Heart     Exercise with a friend. When activity is fun, you're more likely to stick with it.   You may wonder how you can improve the health of your heart. If you re thinking about exercise, you re on the right track. You don t need to become an athlete, but you do need a certain amount of brisk exercise to help strengthen your heart. If you have been diagnosed with a heart condition, your doctor may recommend exercise to help stabilize your condition. To help make exercise a habit, choose safe, fun activities.  Be sure to check with your healthcare provider before starting an exercise program.   Why exercise?  Exercising regularly offers many healthy rewards. It can help you do all of the following:    Improve your blood cholesterol level to help prevent further heart trouble    Lower your blood pressure to help prevent a stroke or heart attack    Control diabetes, or reduce your risk of getting this disease    Improve your heart and lung function    Reach and maintain a healthy weight    Make your muscles stronger and more limber so you can stay active    Prevent falls and fractures by slowing the loss of bone mass (osteoporosis)    Manage stress better    Reduce your blood pressure    Improve your sense of  self and your body image  Exercise tips  Ease into your routine. Set small goals. Then build on them.  Exercise on most days. Aim for a total of 150 or more minutes of moderate to  vigorous intensity activity each week. Consider 40 minutes, 3 to 4 times a week. For best results, activity should last for 40 minutes on average. It is OK to work up to the 40 minute period over time. Examples of moderate-intensity activity is walking 1 mile in 15 minutes or 30 to 45 minutes of yard work.  Step up your daily activity level. Along with your exercise program, try being more active throughout the day. Walk instead of drive. Do more household tasks or yard work.  Choose one or more activities you enjoy. Walking is one of the easiest things you can do. You can also try swimming, riding a bike, dancing, or taking an exercise class.  Stop exercising and call your doctor if you:    Have chest pain or feel dizzy or lightheaded    Feel burning, tightness, pressure, or heaviness in your chest, neck, shoulders, back, or arms    Have unusual shortness of breath    Have increased joint or muscle pain    Have palpitations or an irregular heartbeat   Date Last Reviewed: 5/1/2016 2000-2018 [employer]. [address] [place] [address]. All rights reserved. This information is not intended as a substitute for professional medical care. Always follow your healthcare professional's instructions.          Understanding USDA MyPlate  The USDA (U.S. Department of Agriculture) has guidelines to help you make healthy food choices. These are called MyPlate. MyPlate shows the food groups that make up healthy meals using the image of a place setting. Before you eat, think about the healthiest choices for what to put onto your plate or into your cup or bowl. To learn more about building a healthy plate, visit www.choosemyplate.gov.    The food groups    Fruits. Any fruit or 100% fruit juice counts as part of the Fruit  Group. Fruits may be fresh, canned, frozen, or dried, and may be whole, cut-up, or pureed. Make half your plate fruits and vegetables.    Vegetables. Any vegetable or 100% vegetable juice counts as a member of the Vegetable Group. Vegetables may be fresh, frozen, canned, or dried. They can be served raw or cooked and may be whole, cut-up, or mashed. Make half your plate fruits and vegetables.    Grains. All foods made from grains are part of the Grains Group. These include wheat, rice, oats, cornmeal, and barley such as bread, pasta, oatmeal, cereal, tortillas, and grits. Grains should be no more than a quarter of your plate. At least half of your grains should be whole grains.    Protein. This group includes meat, poultry, seafood, beans and peas, eggs, processed soy products (like tofu), nuts (including nut butters), and seeds. Make protein choices no more than a quarter of your plate. Meat and poultry choices should be lean or low fat.    Dairy. All fluid milk products and foods made from milk that contain calcium, like yogurt and cheese, are part of the Dairy Group. (Foods that have little calcium, such as cream, butter, and cream cheese, are not part of the group.) Most dairy choices should be low-fat or fat-free.    Oils. These are fats that are liquid at room temperature. They include canola, corn, olive, soybean, and sunflower oil. Foods that are mainly oil include mayonnaise, certain salad dressings, and soft margarines. You should have only 5 to 7 teaspoons of oils a day. You probably already get this much from the food you eat.  Use [employer] to help build your meals  The [employer] can help you plan and track your meals and activity. You can look up individual foods to see or compare their nutritional value. You can get guidelines for what and how much you should eat. You can compare your food choices. And you can assess personal physical activities and see ways you can improve. Go to  www.CommScopemyplate.gov/supertracker/. For more eating tips and nutritional information, go to www.CommScopemyplate.gov/ten-tips.  Date Last Reviewed: 8/1/2017 2000-2018 [employer]. [address] [place] [address], [place] [address]. All rights reserved. This information is not intended as a substitute for professional medical care. Always follow your healthcare professional's instructions.          It is recommended that you get a vaccination for shingles called Shingrix (given as 2 shots, 2 to 6 months apart), even if you have already had the Zostavax vaccine. Discuss getting the Shingix vaccine from your pharmacist, or schedule an ancillary shot visit here. Some insurances do not cover the cost of these vaccines.      Please schedule your yearly eye examination.

## 2019-04-23 NOTE — PROGRESS NOTES
"SUBJECTIVE:   Haresh Rock is a 68 year old male who presents for Preventive Visit. Are you in the first 12 months of your Medicare coverage?  No    He also presents for follow-up of atrial fibrillation and SVT, rhythm controlled under care of cardiology. Patient also presents to follow-up diabetes. Diabetic Review of Systems - Medication compliance:  DIET controlled, Diabetic diet compliance:  noncompliant some of the time, Home glucose monitoring:  blood glucose record WAS NOT brought in today, Diabetic ROS: no chest pain, dyspnea or TIA's, no unusual visual symptoms, has dysesthesias in the feet, Last eye exam approximately months ago. Hypercholesterolemia well controlled with current treatment plan without side effects.     Healthy Habits:     In general, how would you rate your overall health?  Good    Frequency of exercise:  None    Do you usually eat at least 4 servings of fruit and vegetables a day, include whole grains    & fiber and avoid regularly eating high fat or \"junk\" foods?  No    Taking medications regularly:  Yes    Medication side effects:  None    Ability to successfully perform activities of daily living:  No assistance needed    Home Safety:  No safety concerns identified    Hearing Impairment:  No hearing concerns    In the past 6 months, have you been bothered by leaking of urine?  No    In general, how would you rate your overall mental or emotional health?  Excellent      PHQ-2 Total Score: 0    Additional concerns today:  Yes  Diabetes   Frequency of exercise:  None  Taking medications regularly:  Yes  Medication side effects:  None  Additional concerns today:  Yes    Do you feel safe in your environment? Yes    Do you have a Health Care Directive? Yes: Advance Directive has been received and scanned.      Fall risk  Fallen 2 or more times in the past year?: No  Any fall with injury in the past year?: No    Cognitive Screening   1) Repeat 3 items (Leader, Season, Table)    2) Clock " draw: NORMAL  3) 3 item recall: Recalls 3 objects  Results: NORMAL clock, 1-2 items recalled: COGNITIVE IMPAIRMENT LESS LIKELY    Mini-CogTM Copyright S Kris. Licensed by the author for use in [facility]; reprinted with permission (orville@[facility]). All rights reserved.      Do you have sleep apnea, excessive snoring or daytime drowsiness?: no    Reviewed and updated as needed this visit by clinical staff  Tobacco  Allergies  Meds  Problems  Med Hx  Surg Hx  Fam Hx  Soc Hx          Reviewed and updated as needed this visit by Provider  Allergies  Meds  Problems  Med Hx  Surg Hx        Social History     Tobacco Use     Smoking status: Never Smoker     Smokeless tobacco: Never Used   Substance Use Topics     Alcohol use: No         Alcohol Use 4/22/2019   Prescreen: >3 drinks/day or >7 drinks/week? Not Applicable   Prescreen: >3 drinks/day or >7 drinks/week? -     Current providers sharing in care for this patient include:   Patient Care Team:  Anya Nowak MD as PCP - General (Family Practice)  Augusto Miller MD as Referring Physician (Internal Medicine)  Jesusita Mejia PA-C as Physician Assistant  Pino Sharma MD as MD (Internal Medicine)  Fabi Jimenez MD as MD (INTERNAL MEDICINE - ENDOCRINOLOGY, DIABETES & METABOLISM)  Tina Mejia, KOSTAS as Nurse Coordinator (Clinical Cardiac Electrophysiology)  Bekah Matt MD as MD (Cardiology)  Bonnie Ford APRN CNP as Nurse Practitioner (Nurse Practitioner - Family)  Leyla Walter PA-C as Assigned PCP  Shanelle Yeager RN as Specialty Care Coordinator (BMT - Adult)    The following health maintenance items are reviewed in Epic and correct as of today:  Health Maintenance   Topic Date Due     ZOSTER IMMUNIZATION (2 of 3) 11/14/2016     EYE EXAM Q1 YEAR  01/17/2019     FALL RISK ASSESSMENT  01/24/2019     MICROALBUMIN Q1 YEAR  01/24/2019     LIPID MONITORING Q1 YEAR  01/25/2019     OP ANNUAL  INR REFERRAL  04/12/2019     A1C Q6 MO  10/23/2019     CMP Q1 YR  02/13/2020     FOOT EXAM Q1 YEAR  04/23/2020     MEDICARE ANNUAL WELLNESS VISIT  04/23/2020     TSH W/ FREE T4 REFLEX Q2 YEAR  05/22/2020     DTAP/TDAP/TD IMMUNIZATION (3 - Td) 09/11/2022     COLON CANCER SCREEN (SYSTEM ASSIGNED)  10/16/2022     ADVANCE DIRECTIVE PLANNING Q5 YRS  01/24/2023     PHQ-2  Completed     INFLUENZA VACCINE  Completed     AORTIC ANEURYSM SCREENING (SYSTEM ASSIGNED)  Completed     HEPATITIS C SCREENING  Completed     IPV IMMUNIZATION  Aged Out     MENINGITIS IMMUNIZATION  Aged Out     Patient Active Problem List   Diagnosis     Hemochromatosis     Antiphospholipid antibody syndrome (H)     Cyixp-xalpxg-sxmj disease, chronic (H)     Advanced directives, counseling/discussion     Polyneuropathy     Status post total knee replacements     Status post bone marrow transplant (H)     Hyperlipidemia with target LDL less than 100     ED (erectile dysfunction)     Atrial fibrillation (H)     Personal history of DVT (deep vein thrombosis)     Chronic rhinitis     Gallstones without obstruction of gallbladder     Long-term (current) use of anticoagulants [Z79.01]     Thyroid nodule     CKD (chronic kidney disease) stage 2, GFR 60-89 ml/min     Type 2 diabetes mellitus with stage 2 chronic kidney disease (H)     Renal hypertension     History of Hodgkin's lymphoma     History of irradiation, presenting hazards to health     Primary pulmonary hypertension (H)     Past Surgical History:   Procedure Laterality Date     ANESTHESIA CARDIOVERSION  4/21/2011    Procedure:ANESTHESIA CARDIOVERSION; Surgeon:GENERIC ANESTHESIA PROVIDER; Location:[facility] OR     ARTHROSCOPY KNEE RT/LT      LT     CATARACT IOL, RT/LT  2017     COLONOSCOPY  10/16/2012    Procedure: COLONOSCOPY;  Colonoscopy, screening;  Surgeon: Reg Feliciano MD;  Location: [facility] OR     H ABLATION FOCAL AFIB  3/5/2013    PVI     H ABLATION FOCAL AFIB  01/01/2018     HC BONE MARROW/STEM  TRANSPLANT ALLOGENIC  5/8/2007     INSERT PORT VASCULAR ACCESS       JOINT REPLACEMTN, KNEE RT/LT  3/28/12    SHAWN     VASECTOMY  1990       Social History     Tobacco Use     Smoking status: Never Smoker     Smokeless tobacco: Never Used   Substance Use Topics     Alcohol use: No     Family History   Problem Relation Age of Onset     Hypertension Mother      Cerebrovascular Disease Mother      Breast Cancer Mother      Arrhythmia Brother      Arrhythmia Sister      Alzheimer Disease Father      Diabetes Paternal Aunt      Gastrointestinal Disease Maternal Grandmother         colitis      Thyroid Disease Sister      C.A.D. No family hx of      Thyroid Cancer No family hx of          Current Outpatient Medications   Medication Sig Dispense Refill     ARTIFICIAL TEAR SOLUTION OP Apply  to eye. USE AS DIRECTED(DF)       CENTRUM OR 1 TABLET DAILY       flecainide (TAMBOCOR) 50 MG tablet Take 1 tablet (50 mg) by mouth 2 times daily 180 tablet 1     folic acid (FOLVITE) 1 MG tablet Take 1 tablet (1,000 mcg) by mouth daily 90 tablet 3     metoprolol tartrate (LOPRESSOR) 25 MG tablet Take 1 tablet (25 mg) by mouth 2 times daily 180 tablet 1     pantoprazole (PROTONIX) 20 MG EC tablet Take 1 tablet (20 mg) by mouth At Bedtime 90 tablet 3     PREVIDENT 5000 DRY MOUTH 1.1 % GEL topical gel Take by mouth daily  3     Pseudoeph-Doxylamine-DM-APAP (NYQUIL PO) Take by mouth as needed       simvastatin (ZOCOR) 10 MG tablet Take 1 tablet (10 mg) by mouth daily 90 tablet 3     warfarin (COUMADIN) 2.5 MG tablet Take 1.25mg (1/2 tablet) on Mondays and Fridays, take 2.5mg (1 tablet) all other days or as directed by coumadin clinic 90 tablet 0     Allergies   Allergen Reactions     No Known Drug Allergy      Review of Systems   Constitutional: Negative for chills and fever.   HENT: Negative for congestion, ear pain, hearing loss and sore throat.    Eyes: Negative for pain and visual disturbance.   Respiratory: Negative for cough and  "shortness of breath.    Cardiovascular: Negative for chest pain, palpitations and peripheral edema.   Gastrointestinal: Negative for abdominal pain, constipation, diarrhea, heartburn, hematochezia and nausea.   Genitourinary: Negative for discharge, dysuria, frequency, genital sores, hematuria, impotence and urgency.   Musculoskeletal: Negative for arthralgias, joint swelling and myalgias.   Skin: Negative for rash.   Neurological: Negative for dizziness, weakness, headaches and paresthesias.   Psychiatric/Behavioral: Negative for mood changes. The patient is not nervous/anxious.      OBJECTIVE:   /60   Pulse 73   Temp 97  F (36.1  C) (Oral)   Resp 18   Ht 1.816 m (5' 11.5\")   Wt 93.4 kg (206 lb)   SpO2 99%   BMI 28.33 kg/m   Estimated body mass index is 28.33 kg/m  as calculated from the following:    Height as of this encounter: 1.816 m (5' 11.5\").    Weight as of this encounter: 93.4 kg (206 lb).  Physical Exam  GENERAL: healthy, alert and no distress  EYES: Eyes grossly normal to inspection, PERRL and conjunctivae and sclerae normal  HENT: ear canals and TM's normal, nose and mouth without ulcers or lesions  NECK: no adenopathy, no asymmetry, masses, or scars and thyroid normal to palpation  RESP: lungs clear to auscultation - no rales, rhonchi or wheezes  CV: regular rate and rhythm, normal S1 S2, no S3 or S4, no murmur, click or rub, no peripheral edema and peripheral pulses strong  ABDOMEN: soft, nontender, no hepatosplenomegaly, no masses and bowel sounds normal  MS: no gross musculoskeletal defects noted, no edema  SKIN: no suspicious lesions or rashes  NEURO: Normal strength and tone, mentation intact and speech normal  PSYCH: mentation appears normal, affect normal/bright  Diabetic foot exam: no trophic changes or ulcerative lesions and diffusely abnormal monofilament exam    Diagnostic Test Results:  Results for orders placed or performed in visit on 04/23/19   HEMOGLOBIN A1C   Result Value " Ref Range    Hemoglobin A1C 6.9 (H) 0 - 5.6 %   UA reflex to Microscopic and Culture   Result Value Ref Range    Color Urine Yellow     Appearance Urine Clear     Glucose Urine Negative NEG^Negative mg/dL    Bilirubin Urine Negative NEG^Negative    Ketones Urine Trace (A) NEG^Negative mg/dL    Specific Gravity Urine >1.030 1.003 - 1.035    Blood Urine Negative NEG^Negative    pH Urine 5.5 5.0 - 7.0 pH    Protein Albumin Urine Trace (A) NEG^Negative mg/dL    Urobilinogen Urine 0.2 0.2 - 1.0 EU/dL    Nitrite Urine Negative NEG^Negative    Leukocyte Esterase Urine Negative NEG^Negative    Source Midstream Urine    Urine Microscopic   Result Value Ref Range    WBC Urine 0 - 5 OTO5^0 - 5 /HPF    RBC Urine O - 2 OTO2^O - 2 /HPF     *Note: Due to a large number of results and/or encounters for the requested time period, some results have not been displayed. A complete set of results can be found in Results Review.       ASSESSMENT / PLAN:   (Z00.00) Encounter for Medicare annual wellness exam  (primary encounter diagnosis)    (I48.0) Paroxysmal atrial fibrillation (H)  (I47.1) Paroxysmal supraventricular tachycardia (H)  Comment: rate controlled and anticoagulated   Plan: OFFICE/OUTPT VISIT,EST,LEVL III        Follow-up with cardiology as planned. Continue chronic anticoagulation under surveillance of protime clinic with goal INR 2 - 3 indefinitely      (E78.5) Hyperlipidemia with target LDL less than 100  Comment: Well controlled with medications without side effects.   Plan: Lipid panel reflex to direct LDL Non-fasting,         simvastatin (ZOCOR) 10 MG tablet, OFFICE/OUTPT         VISIT,EST,LEVL III         (E11.22,  N18.2) Type 2 diabetes mellitus with stage 2 chronic kidney disease, without long-term current use of insulin (H)  Comment: diet controlled   Plan: FOOT EXAM  NO CHARGE [12246.114], Lipid panel         reflex to direct LDL Non-fasting, Albumin         Random Urine Quantitative with Creat Ratio, UA          reflex to Microscopic and Culture, OFFICE/OUTPT        VISIT,EST,LEVL III        Offered diabetes education and metformin; follow-up yearly     (G62.9) Polyneuropathy  Plan: FOOT EXAM  NO CHARGE [09560.114], OFFICE/OUTPT         VISIT,EST,LEVL III        Recommended self foot examinations and supportive shoes     (Z94.81) Status post bone marrow transplant (H)  Comment: doing well; Hx of lymphoma; no current GVH disease   Plan: OFFICE/OUTPT VISIT,EST,LEVL III        Follow-up with oncology as planned     (D68.61) Antiphospholipid antibody syndrome (H)  (E83.110) Hereditary hemochromatosis (H)  Plan: OFFICE/OUTPT VISIT,EST,LEVL III        Continue anticoagulation as above     (I27.0) Primary pulmonary hypertension (H)  Plan: OFFICE/OUTPT VISIT,EST,LEVL III        Follow-up with cardiology as above     (I12.9) Renal hypertension  Comment: Well controlled with medications without side effects.   Plan: Albumin Random Urine Quantitative with Creat         Ratio, UA reflex to Microscopic and Culture,         OFFICE/OUTPT VISIT,EST,LEVL III        Beta blocker preferred over ACE-I for rate control     End of Life Planning:  Patient currently has an advanced directive: Yes.  Practitioner is supportive of decision.    COUNSELING:  Reviewed preventive health counseling, as reflected in patient instructions  Special attention given to:       Regular exercise       Healthy diet/nutrition       Vision screening       Aspirin Prophylaxsis       Hepatitis C screening       HIV screening for high risk patient       Colon cancer screening       Prostate cancer screening       Osteoporosis Prevention/Bone Health       The ASCVD Risk score (Merariilana MENENDEZ Jr., et al., 2013) failed to calculate for the following reasons:    The valid total cholesterol range is 130 to 320 mg/dL    BP Readings from Last 1 Encounters:   04/23/19 110/60     Estimated body mass index is 28.33 kg/m  as calculated from the following:    Height as of this encounter:  "1.816 m (5' 11.5\").    Weight as of this encounter: 93.4 kg (206 lb).      Weight management plan: Discussed healthy diet and exercise guidelines     reports that he has never smoked. He has never used smokeless tobacco.      Appropriate preventive services were discussed with this patient, including applicable screening as appropriate for cardiovascular disease, diabetes, osteopenia/osteoporosis, and glaucoma.  As appropriate for age/gender, discussed screening for colorectal cancer, prostate cancer, breast cancer, and cervical cancer. Checklist reviewing preventive services available has been given to the patient.    Reviewed patients plan of care and provided an AVS. The Basic Care Plan (routine screening as documented in Health Maintenance) for Haresh meets the Care Plan requirement. This Care Plan has been established and reviewed with the Patient.    Counseling Resources:  ATP IV Guidelines  Pooled Cohorts Equation Calculator  Breast Cancer Risk Calculator  FRAX Risk Assessment  ICSI Preventive Guidelines  Dietary Guidelines for Americans, 2010  USDA's MyPlate  ASA Prophylaxis  Lung CA Screening    Anya Nowak MD  [facility] [place]    Identified Health Risks:  "

## 2019-04-23 NOTE — PROGRESS NOTES
He is at risk for lack of exercise and has been provided with information to increase physical activity for the benefit of his well-being.  The patient was counseled and encouraged to consider modifying their diet and eating habits. He was provided with information on recommended healthy diet options.

## 2019-04-23 NOTE — RESULT ENCOUNTER NOTE
Haresh,    Your urine tests are fine. Your cholesterol is controlled. You have diet controlled diabetes, as we discussed. Eat healthy foods like fruits, vegetables, beans, nuts, seeds, and whole grains (such as wheat pasta and wheat bread). Drink water and milk instead of pop and juice. Avoid sweets. Exercise regularly. Follow-up every 6 to 12 months.    Anya Nowak MD

## 2019-05-13 ENCOUNTER — OFFICE VISIT (OUTPATIENT)
Dept: CARDIOLOGY | Facility: CLINIC | Age: 69
End: 2019-05-13
Payer: COMMERCIAL

## 2019-05-13 VITALS
OXYGEN SATURATION: 95 % | HEART RATE: 77 BPM | WEIGHT: 207 LBS | DIASTOLIC BLOOD PRESSURE: 70 MMHG | BODY MASS INDEX: 28.47 KG/M2 | SYSTOLIC BLOOD PRESSURE: 110 MMHG

## 2019-05-13 DIAGNOSIS — I48.0 PAROXYSMAL ATRIAL FIBRILLATION (H): Primary | ICD-10-CM

## 2019-05-13 PROCEDURE — 99214 OFFICE O/P EST MOD 30 MIN: CPT | Mod: GC | Performed by: INTERNAL MEDICINE

## 2019-05-13 NOTE — NURSING NOTE
"Chief Complaint   Patient presents with     Atrial Fib      4 month follow up for PAF.,pacemaker- per patient perez,lightheaded at times       Initial /70 (BP Location: Right arm, Patient Position: Sitting, Cuff Size: Adult Large)   Pulse 77   Wt 93.9 kg (207 lb)   SpO2 95%   BMI 28.47 kg/m   Estimated body mass index is 28.47 kg/m  as calculated from the following:    Height as of 4/23/19: 1.816 m (5' 11.5\").    Weight as of this encounter: 93.9 kg (207 lb)..  BP completed using cuff size: large    Kevin De La Cruz L.P.N.    "

## 2019-05-13 NOTE — PROGRESS NOTES
ELECTROPHYSIOLOGY SPECIALTY CONSULTATION    05/13/2019    REASON FOR VISIT:  The patient comes back for followup of paroxysmal atrial fibrillation and atrial tachycardia.      HISTORY OF PRESENT ILLNESS:  The patient is a 69-year-old gentleman with a history of paroxysmal atrial fibrillation and atrial tachycardia.  He is status post circumferential pulmonary vein isolation for paroxysmal atrial fibrillation in 03/2013, which provided him with an arrhythmia period lasting for about 5 years. Towards the end of 2017, he had recurrent atrial fibrillation and underwent re-isolation of pulmonary veins in 01/2018.  During that ablation, the patient was also found to have a right atrial tachycardia for which he underwent an ablation.  Of note, during that ablation an atypical atrial flutter was also noted which kept degenerating to atrial fibrillation and was not mappable.In 09/2018 and his sotalol dose of 40 mg twice daily was increased to 80 mg twice daily. Despite the increase, a Zio Patch done in September and 10/2018 showed 55 episodes of atrial tachycardia, the longest one lasting for several hours. The patient was not symptomatic for all of the episodes. At his last appointment in October 2018, sotalol was discontinued, and patient was started on metoprolol and then flecainide was initiated.     Interval History:  Patient reports he has felt great lately with no acute issues and reports his heart rate trend on his Apple watch continues to be well controlled. Pt has been getting more active now that the weather is nicer, and has had no issues with this. Pt denies any symptoms of dyspnea, exertional angina, palpitations, presyncope or syncope.    ROS:  A complete 10-point ROS was negative except as above.    PAST MEDICAL HISTORY:  Past Medical History:   Diagnosis Date     Antiphospholipid antibody syndrome (H) 2005    Ravi's     Atrial fibrillation (H) 4/2011     Cirrhosis (H)      CKD (chronic kidney disease) stage  1, GFR 90 ml/min or greater      CKD (chronic kidney disease) stage 2, GFR 60-89 ml/min      Diabetes mellitus type II     steroid induced     DJD (degenerative joint disease) of knee     SHAWN, worse on right     DVT (deep venous thrombosis) (H)      ED (erectile dysfunction)      Gallbladder polyp 5/2010     Ajhon-Suopby-Nmpt Disease 2007    BMT     Hemochromatosis      Hodgkin's disease NOS     5 cycles of ABVD in 2011     HTN (hypertension)      Hyperlipidaemia LDL goal <100      Multiple thyroid nodules     benign      Myeloproliferative disorder (H)     atypical, [facility]     PE (pulmonary embolism) 11/2004     Polyneuropathy      Primary pulmonary hypertension (H)        PAST SURGICAL HISTORY:  Past Surgical History:   Procedure Laterality Date     ANESTHESIA CARDIOVERSION  4/21/2011    Procedure:ANESTHESIA CARDIOVERSION; Surgeon:GENERIC ANESTHESIA PROVIDER; Location:[facility]     ARTHROSCOPY KNEE RT/LT      LT     CATARACT IOL, RT/LT  2017     COLONOSCOPY  10/16/2012    Procedure: COLONOSCOPY;  Colonoscopy, screening;  Surgeon: Reg Feliciano MD;  Location: [facility]     H ABLATION FOCAL AFIB  3/5/2013    PVI     H ABLATION FOCAL AFIB  01/01/2018     HC BONE MARROW/STEM TRANSPLANT ALLOGENIC  5/8/2007     INSERT PORT VASCULAR ACCESS       JOINT REPLACEMTN, KNEE RT/LT  3/28/12    SHAWN     VASECTOMY  1990       FAMILY HISTORY:  Family History   Problem Relation Age of Onset     Hypertension Mother      Cerebrovascular Disease Mother      Breast Cancer Mother      Arrhythmia Brother      Arrhythmia Sister      Alzheimer Disease Father      Diabetes Paternal Aunt      Gastrointestinal Disease Maternal Grandmother         colitis      Thyroid Disease Sister      C.A.D. No family hx of      Thyroid Cancer No family hx of        SOCIAL HISTORY:  Social History     Socioeconomic History     Marital status:      Spouse name: Angelica     Number of children: 2     Years of education: 21     Highest education level: Not  on file   Social Needs     Financial resource strain: Not on file     Food insecurity - worry: Not on file     Food insecurity - inability: Not on file     Transportation needs - medical: Not on file     Transportation needs - non-medical: Not on file   Occupational History     Occupation:      Employer: [employer]     Employer: [employer]   Tobacco Use     Smoking status: Never Smoker     Smokeless tobacco: Never Used   Substance and Sexual Activity     Alcohol use: No     Drug use: No     Sexual activity: Not Currently     Partners: Female   Other Topics Concern     Parent/sibling w/ CABG, MI or angioplasty before 65F 55M? No   Social History Narrative     Not on file       MEDICATIONS:  Current Outpatient Medications   Medication     CENTRUM OR     flecainide (TAMBOCOR) 50 MG tablet     folic acid (FOLVITE) 1 MG tablet     metoprolol tartrate (LOPRESSOR) 25 MG tablet     pantoprazole (PROTONIX) 20 MG EC tablet     PREVIDENT 5000 DRY MOUTH 1.1 % GEL topical gel     Pseudoeph-Doxylamine-DM-APAP (NYQUIL PO)     simvastatin (ZOCOR) 10 MG tablet     warfarin (COUMADIN) 2.5 MG tablet     ARTIFICIAL TEAR SOLUTION OP     No current facility-administered medications for this visit.        ALLERGIES:     Allergies   Allergen Reactions     No Known Drug Allergy        PHYSICAL EXAM:  /70 (BP Location: Right arm, Patient Position: Sitting, Cuff Size: Adult Large)   Pulse 77   Wt 93.9 kg (207 lb)   SpO2 95%   BMI 28.47 kg/m    Gen: alert, interactive, NAD  HEENT: atraumatic, EOMI, MMM  Neck: supple, no JVD  CV: RRR, no m/r/g  Chest: CTAB, no wheezes or crackles  Abd: soft, NT, ND, +BS  Ext: no LE edema, 2+ peripheral pulses  Skin: warm and dry, no rashes on exposed surfaces  Neuro: alert and oriented, no focal deficits    LABS:    CMP  Last Comprehensive Metabolic Panel:  Sodium   Date Value Ref Range Status   02/13/2019 138 133 - 144 mmol/L Final     Potassium   Date Value Ref Range Status   02/13/2019 4.4  3.4 - 5.3 mmol/L Final     Chloride   Date Value Ref Range Status   02/13/2019 104 94 - 109 mmol/L Final     Carbon Dioxide   Date Value Ref Range Status   02/13/2019 29 20 - 32 mmol/L Final     Anion Gap   Date Value Ref Range Status   02/13/2019 5 3 - 14 mmol/L Final     Glucose   Date Value Ref Range Status   02/13/2019 232 (H) 70 - 99 mg/dL Final     Urea Nitrogen   Date Value Ref Range Status   02/13/2019 25 7 - 30 mg/dL Final     Creatinine   Date Value Ref Range Status   02/13/2019 1.32 (H) 0.66 - 1.25 mg/dL Final     GFR Estimate   Date Value Ref Range Status   02/13/2019 54 (L) >60 mL/min/[1.73_m2] Final     Comment:     Non  GFR Calc  Starting 12/18/2018, serum creatinine based estimated GFR (eGFR) will be   calculated using the Chronic Kidney Disease Epidemiology Collaboration   (CKD-EPI) equation.       Calcium   Date Value Ref Range Status   02/13/2019 8.5 8.5 - 10.1 mg/dL Final     Bilirubin Total   Date Value Ref Range Status   02/13/2019 1.0 0.2 - 1.3 mg/dL Final     Alkaline Phosphatase   Date Value Ref Range Status   02/13/2019 126 40 - 150 U/L Final     ALT   Date Value Ref Range Status   02/13/2019 25 0 - 70 U/L Final     AST   Date Value Ref Range Status   02/13/2019 31 0 - 45 U/L Final       CBC  Lab Results   Component Value Date    WBC 7.6 02/13/2019     Lab Results   Component Value Date    RBC 5.31 02/13/2019     Lab Results   Component Value Date    HGB 17.7 02/13/2019     Lab Results   Component Value Date    HCT 50.1 02/13/2019     Lab Results   Component Value Date    MCV 94 02/13/2019     Lab Results   Component Value Date    MCH 33.3 02/13/2019     Lab Results   Component Value Date    MCHC 35.3 02/13/2019     Lab Results   Component Value Date    RDW 12.8 02/13/2019     Lab Results   Component Value Date     02/13/2019     TROP  Lab Results   Component Value Date    TROPI 0.025 12/20/2008    TROPONIN 0.05 10/15/2011    TROPONIN 0.08 09/30/2011    TROPONIN 0.00  08/08/2011    TROPONIN 0.00 06/14/2011    TROPONIN 0.01 04/20/2011       LIPIDS  Recent Labs   Lab Test 04/23/19  0846 01/25/18  0738  11/16/15  1155 07/09/14  1511   CHOL 128 122   < > 126 168   HDL 31* 31*   < > 31* 29*   LDL 68 69   < > 71 104   TRIG 143 111   < > 119 174*   CHOLHDLRATIO  --   --   --  4.1 5.8*    < > = values in this interval not displayed.     TSH  TSH   Date Value Ref Range Status   05/22/2018 2.38 0.40 - 4.00 mU/L Final       HBA1C  Lab Results   Component Value Date    A1C 6.4 01/25/2018    A1C 6.7 11/29/2016    A1C 6.6 04/18/2016    A1C 6.6 11/16/2015    A1C 6.3 10/21/2014       CARDIAC DATA:  Personally reviewed recent images including recent exercise stress test and EKGs.    ASSESSMENT/PLAN:  1.  Paroxysmal atrial fibrillation, status post circumferential pulmonary vein isolation in 2013 with repeat isolation in 01/2018 - no known recurrence since the second ablation. [id number] score 5.  2.  Paroxysmal atrial tachycardia - refractory to sotalol 80 mg twice daily.   Symptoms resolved and heart rate trends on Apple watch are much improved. Exercise stress test on flecainide with no evidence of VT and no significant QRS widening.  - Continue flecainide 50 mg BID  - Continue metoprolol 25 mg BID  - Continue warfarin with INR goal 2-3  - Continue heart rate monitoring on Apple watch     RTC: 1 year with Farrah Jain Pt seen and discussed with attending, Dr. Matt.    Farrah Steven MD  Cardiology Fellow      I have seen, interviewed, and examined patient. I have reviewed the laboratory tests, imaging, and other investigations. I have reviewed the management plan with the patient. I discussed with the team and agree with the findings and plan in this resident/fellow/nurse practitioner's note. In addition, changes in the physical examination, assessment and plan have been incorporated into the note by myself, as to make it a single cohesive document.       Bekah Matt MD,  MS  Cardiology/Cardiac EP Attending Staff

## 2019-05-13 NOTE — PATIENT INSTRUCTIONS
Thank you for coming to the [facility] @ [place]; please note the following instructions:    1. Follow up with Farrah Jain NP in 1 year.        If you have any questions regarding your visit please contact your care team:     Cardiology  Telephone Number   Tiffany PÉREZ, RN  Salma KELSEY,RN  Mandi RAMIREZ, QASIM NEFF, MA  Kevin RUFFIN, PERIN   [phone]    *After hours: [phone]   For scheduling appts:     [phone] or    [phone] (select option 1)    *After hours: [phone]     For the Device Clinic (Pacemakers and ICD's)  RN's :  Chelsey Carrera   During business hours: [phone]    *After business hours:  [phone] (select option 4)      Normal test result notifications will be released via [employer] or mailed within 7 business days.  All other test results, will be communicated via telephone once reviewed by your cardiologist.    If you need a medication refill please contact your pharmacy.  Please allow 3 business days for your refill to be completed.    As always, thank you for trusting us with your health care needs!

## 2019-05-13 NOTE — LETTER
5/13/2019      RE: Haresh Rock  [address] [place] [address]       Dear Colleague,    Thank you for the opportunity to participate in the care of your patient, Haresh Rock, at the [facility] AT [facility]. Please see a copy of my visit note below.    ELECTROPHYSIOLOGY SPECIALTY CONSULTATION    05/13/2019    REASON FOR VISIT:  The patient comes back for followup of paroxysmal atrial fibrillation and atrial tachycardia.      HISTORY OF PRESENT ILLNESS:  The patient is a 69-year-old gentleman with a history of paroxysmal atrial fibrillation and atrial tachycardia.  He is status post circumferential pulmonary vein isolation for paroxysmal atrial fibrillation in 03/2013, which provided him with an arrhythmia period lasting for about 5 years. Towards the end of 2017, he had recurrent atrial fibrillation and underwent re-isolation of pulmonary veins in 01/2018.  During that ablation, the patient was also found to have a right atrial tachycardia for which he underwent an ablation.  Of note, during that ablation an atypical atrial flutter was also noted which kept degenerating to atrial fibrillation and was not mappable.In 09/2018 and his sotalol dose of 40 mg twice daily was increased to 80 mg twice daily. Despite the increase, a Zio Patch done in September and 10/2018 showed 55 episodes of atrial tachycardia, the longest one lasting for several hours. The patient was not symptomatic for all of the episodes. At his last appointment in October 2018, sotalol was discontinued, and patient was started on metoprolol and then flecainide was initiated.     Interval History:  Patient reports he has felt great lately with no acute issues and reports his heart rate trend on his Apple watch continues to be well controlled. Pt has been getting more active now that the weather is nicer, and has had no issues with this. Pt denies any  symptoms of dyspnea, exertional angina, palpitations, presyncope or syncope.    ROS:  A complete 10-point ROS was negative except as above.    PAST MEDICAL HISTORY:  Past Medical History:   Diagnosis Date     Antiphospholipid antibody syndrome (H) 2005    Ravi's     Atrial fibrillation (H) 4/2011     Cirrhosis (H)      CKD (chronic kidney disease) stage 1, GFR 90 ml/min or greater      CKD (chronic kidney disease) stage 2, GFR 60-89 ml/min      Diabetes mellitus type II     steroid induced     DJD (degenerative joint disease) of knee     HSAWN, worse on right     DVT (deep venous thrombosis) (H)      ED (erectile dysfunction)      Gallbladder polyp 5/2010     Gyoap-Slrdwg-Fegu Disease 2007    BMT     Hemochromatosis      Hodgkin's disease NOS     5 cycles of ABVD in 2011     HTN (hypertension)      Hyperlipidaemia LDL goal <100      Multiple thyroid nodules     benign      Myeloproliferative disorder (H)     atypical, [facility]     PE (pulmonary embolism) 11/2004     Polyneuropathy      Primary pulmonary hypertension (H)        PAST SURGICAL HISTORY:  Past Surgical History:   Procedure Laterality Date     ANESTHESIA CARDIOVERSION  4/21/2011    Procedure:ANESTHESIA CARDIOVERSION; Surgeon:GENERIC ANESTHESIA PROVIDER; Location:[facility] OR     ARTHROSCOPY KNEE RT/LT      LT     CATARACT IOL, RT/LT  2017     COLONOSCOPY  10/16/2012    Procedure: COLONOSCOPY;  Colonoscopy, screening;  Surgeon: Reg Feliciano MD;  Location: [facility] OR     H ABLATION FOCAL AFIB  3/5/2013    PVI     H ABLATION FOCAL AFIB  01/01/2018     HC BONE MARROW/STEM TRANSPLANT ALLOGENIC  5/8/2007     INSERT PORT VASCULAR ACCESS       JOINT REPLACEMTN, KNEE RT/LT  3/28/12    SHAWN     VASECTOMY  1990       FAMILY HISTORY:  Family History   Problem Relation Age of Onset     Hypertension Mother      Cerebrovascular Disease Mother      Breast Cancer Mother      Arrhythmia Brother      Arrhythmia Sister      Alzheimer Disease Father      Diabetes Paternal Aunt       Gastrointestinal Disease Maternal Grandmother         colitis      Thyroid Disease Sister      C.A.D. No family hx of      Thyroid Cancer No family hx of        SOCIAL HISTORY:  Social History     Socioeconomic History     Marital status:      Spouse name: Angelica     Number of children: 2     Years of education: 21     Highest education level: Not on file   Social Needs     Financial resource strain: Not on file     Food insecurity - worry: Not on file     Food insecurity - inability: Not on file     Transportation needs - medical: Not on file     Transportation needs - non-medical: Not on file   Occupational History     Occupation:      Employer: [employer]     Employer: [employer]   Tobacco Use     Smoking status: Never Smoker     Smokeless tobacco: Never Used   Substance and Sexual Activity     Alcohol use: No     Drug use: No     Sexual activity: Not Currently     Partners: Female   Other Topics Concern     Parent/sibling w/ CABG, MI or angioplasty before 65F 55M? No   Social History Narrative     Not on file       MEDICATIONS:  Current Outpatient Medications   Medication     CENTRUM OR     flecainide (TAMBOCOR) 50 MG tablet     folic acid (FOLVITE) 1 MG tablet     metoprolol tartrate (LOPRESSOR) 25 MG tablet     pantoprazole (PROTONIX) 20 MG EC tablet     PREVIDENT 5000 DRY MOUTH 1.1 % GEL topical gel     Pseudoeph-Doxylamine-DM-APAP (NYQUIL PO)     simvastatin (ZOCOR) 10 MG tablet     warfarin (COUMADIN) 2.5 MG tablet     ARTIFICIAL TEAR SOLUTION OP     No current facility-administered medications for this visit.        ALLERGIES:     Allergies   Allergen Reactions     No Known Drug Allergy        PHYSICAL EXAM:  /70 (BP Location: Right arm, Patient Position: Sitting, Cuff Size: Adult Large)   Pulse 77   Wt 93.9 kg (207 lb)   SpO2 95%   BMI 28.47 kg/m     Gen: alert, interactive, NAD  HEENT: atraumatic, EOMI, MMM  Neck: supple, no JVD  CV: RRR, no m/r/g  Chest: CTAB, no wheezes or  crackles  Abd: soft, NT, ND, +BS  Ext: no LE edema, 2+ peripheral pulses  Skin: warm and dry, no rashes on exposed surfaces  Neuro: alert and oriented, no focal deficits    LABS:    CMP  Last Comprehensive Metabolic Panel:  Sodium   Date Value Ref Range Status   02/13/2019 138 133 - 144 mmol/L Final     Potassium   Date Value Ref Range Status   02/13/2019 4.4 3.4 - 5.3 mmol/L Final     Chloride   Date Value Ref Range Status   02/13/2019 104 94 - 109 mmol/L Final     Carbon Dioxide   Date Value Ref Range Status   02/13/2019 29 20 - 32 mmol/L Final     Anion Gap   Date Value Ref Range Status   02/13/2019 5 3 - 14 mmol/L Final     Glucose   Date Value Ref Range Status   02/13/2019 232 (H) 70 - 99 mg/dL Final     Urea Nitrogen   Date Value Ref Range Status   02/13/2019 25 7 - 30 mg/dL Final     Creatinine   Date Value Ref Range Status   02/13/2019 1.32 (H) 0.66 - 1.25 mg/dL Final     GFR Estimate   Date Value Ref Range Status   02/13/2019 54 (L) >60 mL/min/[1.73_m2] Final     Comment:     Non  GFR Calc  Starting 12/18/2018, serum creatinine based estimated GFR (eGFR) will be   calculated using the Chronic Kidney Disease Epidemiology Collaboration   (CKD-EPI) equation.       Calcium   Date Value Ref Range Status   02/13/2019 8.5 8.5 - 10.1 mg/dL Final     Bilirubin Total   Date Value Ref Range Status   02/13/2019 1.0 0.2 - 1.3 mg/dL Final     Alkaline Phosphatase   Date Value Ref Range Status   02/13/2019 126 40 - 150 U/L Final     ALT   Date Value Ref Range Status   02/13/2019 25 0 - 70 U/L Final     AST   Date Value Ref Range Status   02/13/2019 31 0 - 45 U/L Final       CBC  Lab Results   Component Value Date    WBC 7.6 02/13/2019     Lab Results   Component Value Date    RBC 5.31 02/13/2019     Lab Results   Component Value Date    HGB 17.7 02/13/2019     Lab Results   Component Value Date    HCT 50.1 02/13/2019     Lab Results   Component Value Date    MCV 94 02/13/2019     Lab Results   Component  Value Date    MCH 33.3 02/13/2019     Lab Results   Component Value Date    MCHC 35.3 02/13/2019     Lab Results   Component Value Date    RDW 12.8 02/13/2019     Lab Results   Component Value Date     02/13/2019     TROP  Lab Results   Component Value Date    TROPI 0.025 12/20/2008    TROPONIN 0.05 10/15/2011    TROPONIN 0.08 09/30/2011    TROPONIN 0.00 08/08/2011    TROPONIN 0.00 06/14/2011    TROPONIN 0.01 04/20/2011       LIPIDS  Recent Labs   Lab Test 04/23/19  0846 01/25/18  0738  11/16/15  1155 07/09/14  1511   CHOL 128 122   < > 126 168   HDL 31* 31*   < > 31* 29*   LDL 68 69   < > 71 104   TRIG 143 111   < > 119 174*   CHOLHDLRATIO  --   --   --  4.1 5.8*    < > = values in this interval not displayed.     TSH  TSH   Date Value Ref Range Status   05/22/2018 2.38 0.40 - 4.00 mU/L Final       HBA1C  Lab Results   Component Value Date    A1C 6.4 01/25/2018    A1C 6.7 11/29/2016    A1C 6.6 04/18/2016    A1C 6.6 11/16/2015    A1C 6.3 10/21/2014       CARDIAC DATA:  Personally reviewed recent images including recent exercise stress test and EKGs.    ASSESSMENT/PLAN:  1.  Paroxysmal atrial fibrillation, status post circumferential pulmonary vein isolation in 2013 with repeat isolation in 01/2018 - no known recurrence since the second ablation. [id number] score 5.  2.  Paroxysmal atrial tachycardia - refractory to sotalol 80 mg twice daily.   Symptoms resolved and heart rate trends on Apple watch are much improved. Exercise stress test on flecainide with no evidence of VT and no significant QRS widening.  - Continue flecainide 50 mg BID  - Continue metoprolol 25 mg BID  - Continue warfarin with INR goal 2-3  - Continue heart rate monitoring on Apple watch     RTC: 1 year with Farrah Jain    Pt seen and discussed with attending, Dr. Matt.    Farrah Steven MD  Cardiology Fellow      I have seen, interviewed, and examined patient. I have reviewed the laboratory tests, imaging, and other investigations.  I have reviewed the management plan with the patient. I discussed with the team and agree with the findings and plan in this resident/fellow/nurse practitioner's note. In addition, changes in the physical examination, assessment and plan have been incorporated into the note by myself, as to make it a single cohesive document.       Bekah Matt MD, MS  Cardiology/Cardiac EP Attending Staff

## 2019-05-14 ENCOUNTER — ANTICOAGULATION THERAPY VISIT (OUTPATIENT)
Dept: NURSING | Facility: CLINIC | Age: 69
End: 2019-05-14
Payer: COMMERCIAL

## 2019-05-14 ENCOUNTER — TELEPHONE (OUTPATIENT)
Dept: FAMILY MEDICINE | Facility: CLINIC | Age: 69
End: 2019-05-14

## 2019-05-14 DIAGNOSIS — I48.0 PAROXYSMAL ATRIAL FIBRILLATION (H): Primary | ICD-10-CM

## 2019-05-14 DIAGNOSIS — I48.0 PAROXYSMAL ATRIAL FIBRILLATION (H): ICD-10-CM

## 2019-05-14 DIAGNOSIS — D68.61 ANTIPHOSPHOLIPID ANTIBODY SYNDROME (H): ICD-10-CM

## 2019-05-14 LAB — INR POINT OF CARE: 2.2 (ref 0.86–1.14)

## 2019-05-14 PROCEDURE — 85610 PROTHROMBIN TIME: CPT | Mod: QW

## 2019-05-14 PROCEDURE — 99207 ZZC NO CHARGE NURSE ONLY: CPT

## 2019-05-14 PROCEDURE — 36416 COLLJ CAPILLARY BLOOD SPEC: CPT

## 2019-05-14 NOTE — PROGRESS NOTES
ANTICOAGULATION FOLLOW-UP CLINIC VISIT    Patient Name:  Haresh Rock  Date:  2019  Contact Type:  Face to Face    SUBJECTIVE:  Patient Findings     Comments:   Bleeding Signs/Symptoms: NO  Thromboembolic Signs/Symptoms: NO     Medication Changes:  NO  Dietary Changes:  NO  Bacterial/Viral Infection: NO     Missed Coumadin Doses: NO  Other Concerns:  NO          Clinical Outcomes     Negatives:   Major bleeding event, Thromboembolic event, Anticoagulation-related hospital admission, Anticoagulation-related ED visit, Anticoagulation-related fatality    Comments:   Bleeding Signs/Symptoms: NO  Thromboembolic Signs/Symptoms: NO     Medication Changes:  NO  Dietary Changes:  NO  Bacterial/Viral Infection: NO     Missed Coumadin Doses: NO  Other Concerns:  NO             OBJECTIVE    INR Protime   Date Value Ref Range Status   2019 2.2 (A) 0.86 - 1.14 Final     Factor 2 Assay   Date Value Ref Range Status   2007 58 (L) 60 - 140 % Final     Comment:     (Note)  CALLED TO TAAMRA(INTERV. RADIOLOGY)[id number],        ASSESSMENT / PLAN  No question data found.  Anticoagulation Summary  As of 2019    INR goal:   2.0-3.0   TTR:   79.3 % (3 y)   INR used for dosin.2 (2019)   Warfarin maintenance plan:   1.25 mg (2.5 mg x 0.5) every Mon, Fri; 2.5 mg (2.5 mg x 1) all other days   Full warfarin instructions:   1.25 mg every Mon, Fri; 2.5 mg all other days   Weekly warfarin total:   15 mg   No change documented:   Laverne Ferguson RN   Plan last modified:   Laverne Ferguson RN (2018)   Next INR check:   2019   Priority:   INR   Target end date:   Indefinite    Indications    Long-term (current) use of anticoagulants [Z79.01] [Z79.01]  Atrial fibrillation (H) [I48.91]             Anticoagulation Episode Summary     INR check location:       Preferred lab:       Send INR reminders to:   [facility]    Comments:         Anticoagulation Care Providers     Provider Role Specialty Phone  number    Anya Nowak MD [facility] [phone]            See the Encounter Report to view Anticoagulation Flowsheet and Dosing Calendar (Go to Encounters tab in chart review, and find the Anticoagulation Therapy Visit)        Laverne Ferguson RN

## 2019-05-14 NOTE — PATIENT INSTRUCTIONS
[facility]:  Please call [phone] to cancel and/or reschedule your appointment   Please call [phone] with any problems or questions regarding your Warfarin therapy.

## 2019-05-20 DIAGNOSIS — Z94.81 STATUS POST BONE MARROW TRANSPLANT (H): Primary | ICD-10-CM

## 2019-05-20 DIAGNOSIS — E83.110 HEREDITARY HEMOCHROMATOSIS (H): ICD-10-CM

## 2019-05-20 DIAGNOSIS — Z79.01 LONG TERM CURRENT USE OF ANTICOAGULANT THERAPY: ICD-10-CM

## 2019-05-20 DIAGNOSIS — C85.90 LYMPHOMA, UNSPECIFIED BODY REGION, UNSPECIFIED LYMPHOMA TYPE (H): ICD-10-CM

## 2019-05-21 ENCOUNTER — APPOINTMENT (OUTPATIENT)
Dept: LAB | Facility: CLINIC | Age: 69
End: 2019-05-21
Attending: INTERNAL MEDICINE
Payer: COMMERCIAL

## 2019-05-21 ENCOUNTER — ONCOLOGY VISIT (OUTPATIENT)
Dept: TRANSPLANT | Facility: CLINIC | Age: 69
End: 2019-05-21
Attending: INTERNAL MEDICINE
Payer: COMMERCIAL

## 2019-05-21 VITALS
OXYGEN SATURATION: 98 % | BODY MASS INDEX: 28.61 KG/M2 | RESPIRATION RATE: 18 BRPM | TEMPERATURE: 98.3 F | HEART RATE: 62 BPM | WEIGHT: 208 LBS | SYSTOLIC BLOOD PRESSURE: 135 MMHG | DIASTOLIC BLOOD PRESSURE: 78 MMHG

## 2019-05-21 DIAGNOSIS — C85.90 LYMPHOMA, UNSPECIFIED BODY REGION, UNSPECIFIED LYMPHOMA TYPE (H): ICD-10-CM

## 2019-05-21 DIAGNOSIS — Z79.01 LONG TERM CURRENT USE OF ANTICOAGULANT THERAPY: ICD-10-CM

## 2019-05-21 DIAGNOSIS — Z94.81 STATUS POST BONE MARROW TRANSPLANT (H): ICD-10-CM

## 2019-05-21 DIAGNOSIS — E83.110 HEREDITARY HEMOCHROMATOSIS (H): ICD-10-CM

## 2019-05-21 LAB
ALBUMIN SERPL-MCNC: 3.5 G/DL (ref 3.4–5)
ALP SERPL-CCNC: 112 U/L (ref 40–150)
ALT SERPL W P-5'-P-CCNC: 33 U/L (ref 0–70)
ANION GAP SERPL CALCULATED.3IONS-SCNC: 7 MMOL/L (ref 3–14)
AST SERPL W P-5'-P-CCNC: 49 U/L (ref 0–45)
BASOPHILS # BLD AUTO: 0.1 10E9/L (ref 0–0.2)
BASOPHILS NFR BLD AUTO: 0.6 %
BILIRUB SERPL-MCNC: 0.9 MG/DL (ref 0.2–1.3)
BUN SERPL-MCNC: 18 MG/DL (ref 7–30)
CALCIUM SERPL-MCNC: 8.7 MG/DL (ref 8.5–10.1)
CHLORIDE SERPL-SCNC: 105 MMOL/L (ref 94–109)
CO2 SERPL-SCNC: 24 MMOL/L (ref 20–32)
CREAT SERPL-MCNC: 1.09 MG/DL (ref 0.66–1.25)
DIFFERENTIAL METHOD BLD: ABNORMAL
EOSINOPHIL # BLD AUTO: 0.1 10E9/L (ref 0–0.7)
EOSINOPHIL NFR BLD AUTO: 1.4 %
ERYTHROCYTE [DISTWIDTH] IN BLOOD BY AUTOMATED COUNT: 12 % (ref 10–15)
FERRITIN SERPL-MCNC: 141 NG/ML (ref 26–388)
GFR SERPL CREATININE-BSD FRML MDRD: 68 ML/MIN/{1.73_M2}
GLUCOSE SERPL-MCNC: 157 MG/DL (ref 70–99)
HCT VFR BLD AUTO: 47 % (ref 40–53)
HGB BLD-MCNC: 16.8 G/DL (ref 13.3–17.7)
IMM GRANULOCYTES # BLD: 0 10E9/L (ref 0–0.4)
IMM GRANULOCYTES NFR BLD: 0.2 %
INR PPP: 2.18 (ref 0.86–1.14)
IRON SATN MFR SERPL: 69 % (ref 15–46)
IRON SERPL-MCNC: 168 UG/DL (ref 35–180)
LYMPHOCYTES # BLD AUTO: 3 10E9/L (ref 0.8–5.3)
LYMPHOCYTES NFR BLD AUTO: 35.5 %
MCH RBC QN AUTO: 32.7 PG (ref 26.5–33)
MCHC RBC AUTO-ENTMCNC: 35.7 G/DL (ref 31.5–36.5)
MCV RBC AUTO: 92 FL (ref 78–100)
MONOCYTES # BLD AUTO: 0.8 10E9/L (ref 0–1.3)
MONOCYTES NFR BLD AUTO: 9.3 %
NEUTROPHILS # BLD AUTO: 4.5 10E9/L (ref 1.6–8.3)
NEUTROPHILS NFR BLD AUTO: 53 %
NRBC # BLD AUTO: 0 10*3/UL
NRBC BLD AUTO-RTO: 0 /100
PLATELET # BLD AUTO: 120 10E9/L (ref 150–450)
POTASSIUM SERPL-SCNC: 4.3 MMOL/L (ref 3.4–5.3)
PROT SERPL-MCNC: 7 G/DL (ref 6.8–8.8)
RBC # BLD AUTO: 5.13 10E12/L (ref 4.4–5.9)
SODIUM SERPL-SCNC: 136 MMOL/L (ref 133–144)
TIBC SERPL-MCNC: 242 UG/DL (ref 240–430)
WBC # BLD AUTO: 8.5 10E9/L (ref 4–11)

## 2019-05-21 PROCEDURE — G0463 HOSPITAL OUTPT CLINIC VISIT: HCPCS

## 2019-05-21 PROCEDURE — 83550 IRON BINDING TEST: CPT | Performed by: INTERNAL MEDICINE

## 2019-05-21 PROCEDURE — 83540 ASSAY OF IRON: CPT | Performed by: INTERNAL MEDICINE

## 2019-05-21 PROCEDURE — 85610 PROTHROMBIN TIME: CPT | Performed by: INTERNAL MEDICINE

## 2019-05-21 PROCEDURE — 82728 ASSAY OF FERRITIN: CPT | Performed by: INTERNAL MEDICINE

## 2019-05-21 PROCEDURE — 85025 COMPLETE CBC W/AUTO DIFF WBC: CPT | Performed by: INTERNAL MEDICINE

## 2019-05-21 PROCEDURE — 36415 COLL VENOUS BLD VENIPUNCTURE: CPT

## 2019-05-21 PROCEDURE — 80053 COMPREHEN METABOLIC PANEL: CPT | Performed by: INTERNAL MEDICINE

## 2019-05-21 ASSESSMENT — PAIN SCALES - GENERAL: PAINLEVEL: NO PAIN (0)

## 2019-05-21 NOTE — PROGRESS NOTES
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 No cardiac issue no arrhthymias since on felcanide no chest pain.Has not had a phlebotomy for a while.Still on warfarin 2.5mg and 1.25 mg on Monday and Fridays  Blood sugar has been ok  No bleeding  Was seen by derm and biopsies were benign seborrheic keratoses    PAST MEDICAL HISTORY:  See my note from 11/2018    SOCIAL HISTORY:  See my note from 11/2018     FAMILY HISTORY:  See my note from  11/2018     REVIEW OF SYSTEMS:  A 12-point review of systems is done and is negative, except as in the HPI.      PHYSICAL EXAMINATION:   GENERAL:  He is an alert gentleman in no acute distress.   VITAL SIGNS:  Stable. /78 (BP Location: Right arm, Patient Position: Sitting, Cuff Size: Adult Regular)   Pulse 62   Temp 98.3  F (36.8  C) (Oral)   Resp 18   Wt 94.3 kg (208 lb)   SpO2 98%   BMI 28.61 kg/m      HEENT:  Normocephalic. EOM okay, no scleral icterus. Mouth without lesion.  RESPIRATORY:  Clear to percussion and auscultation.   LYMPH: No cervical, supraclavicular, submandibular, axillary or inguinal nodes.   CARDIAC: Normal sinus rhythm.  No S3, S4. 1-2/6 SAYDA best heard along the right sternal border with no radiation.  ABDOMEN:  Soft without hepatosplenomegaly.  SKIN:  Extremities appear warm and well-perfused without edema.  Extensive brown-black plaques noted on the back, concentrated more on the upper back.    Results for HARESH GRANADOS (MRN [id number]) as of 5/21/2019 11:53   Ref. Range 5/21/2019 10:45   Sodium Latest Ref Range: 133 - 144 mmol/L 136    Potassium Latest Ref Range: 3.4 - 5.3 mmol/L 4.3   Chloride Latest Ref Range: 94 - 109 mmol/L 105   Carbon Dioxide Latest Ref Range: 20 - 32 mmol/L 24   Urea Nitrogen Latest Ref Range: 7 - 30 mg/dL 18   Creatinine Latest Ref Range: 0.66 - 1.25 mg/dL 1.09   GFR Estimate Latest Ref Range: >60 mL/min/1.73_m2 68   GFR Estimate If Black Latest Ref Range: >60 mL/min/1.73_m2 79   Calcium Latest Ref Range: 8.5 - 10.1 mg/dL 8.7   Anion Gap Latest Ref Range: 3 - 14 mmol/L 7   Albumin Latest Ref Range: 3.4 - 5.0 g/dL 3.5   Protein Total Latest Ref Range: 6.8 - 8.8 g/dL 7.0   Bilirubin Total Latest Ref Range: 0.2 - 1.3 mg/dL 0.9   Alkaline Phosphatase Latest Ref Range: 40 - 150 U/L 112   ALT Latest Ref Range: 0 - 70 U/L 33   AST Latest Ref Range: 0 - 45 U/L 49 (H)   Ferritin Latest Ref Range: 26 - 388 ng/mL 141   Iron Latest Ref Range: 35 - 180 ug/dL 168   Iron Binding Cap Latest Ref Range: 240 - 430 ug/dL 242   Iron Saturation Index Latest Ref Range: 15 - 46 % 69 (H)   Glucose Latest Ref Range: 70 - 99 mg/dL 157 (H)   WBC Latest Ref Range: 4.0 - 11.0 10e9/L 8.5   Hemoglobin Latest Ref Range: 13.3 - 17.7 g/dL 16.8   Hematocrit Latest Ref Range: 40.0 - 53.0 % 47.0   Platelet Count Latest Ref Range: 150 - 450 10e9/L 120 (L)   RBC Count Latest Ref Range: 4.4 - 5.9 10e12/L 5.13   MCV Latest Ref Range: 78 - 100 fl 92   MCH Latest Ref Range: 26.5 - 33.0 pg 32.7   MCHC Latest Ref Range: 31.5 - 36.5 g/dL 35.7   RDW Latest Ref Range: 10.0 - 15.0 % 12.0   Diff Method Unknown Automated Method   % Neutrophils Latest Units: % 53.0   % Lymphocytes Latest Units: % 35.5   % Monocytes Latest Units: % 9.3   % Eosinophils Latest Units: % 1.4   % Basophils Latest Units: % 0.6   % Immature Granulocytes Latest Units: % 0.2   Nucleated RBCs Latest Ref Range: 0 /100 0   Absolute Neutrophil Latest Ref Range: 1.6 - 8.3 10e9/L 4.5   Absolute Lymphocytes Latest Ref Range: 0.8 - 5.3 10e9/L 3.0   Absolute Monocytes Latest Ref Range: 0.0 - 1.3 10e9/L 0.8    Absolute Eosinophils Latest Ref Range: 0.0 - 0.7 10e9/L 0.1   Absolute Basophils Latest Ref Range: 0.0 - 0.2 10e9/L 0.1   Abs Immature Granulocytes Latest Ref Range: 0 - 0.4 10e9/L 0.0   Absolute Nucleated RBC Unknown 0.0   INR Latest Ref Range: 0.86 - 1.14  2.18 (H)       ASSESSMENT:     1.  Myeloproliferative syndrome/MDS.   2.  Status post non-myeloablative allo-sib peripheral blood stem cell transplant.   3.  History of chronic scccv-yoozdd-vlpv disease.   4.  History of Hodgkin's disease.   5.  History of cardiac arrhythmias, SVT and V tach.   6.  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   7.  History of cirrhosis.   8.  History of pulmonary emboli.   9.  History of elevated hemoglobin A1c.   10.  Thyroid nodule.    11.  Cholelithiasis.     12.  Diverticulosis.   13.  Anticoagulation.   14.  Status post bilateral knee replacement.     15.  Aortic stenosis  16. 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  17. Seborrheic keratosis of the back.     In general Haresh has been well. Hodgkin's lymphoma remains in remission. It looks like his cardiac rhythm is NSR,well-controlled so far with metoprolol+flecainide. Today his ferritin is 141and iron saturation is 69%, both of which are higher than I would like. He will need a phlebotomy now and repeat labs in 3 months given his hx of cirrhosis and liver GVH. Plan is to get iron panels in 3 months, and I will see him again in 6 months' time, likely for phlebotomy as well if not before.

## 2019-05-21 NOTE — NURSING NOTE
Chief Complaint   Patient presents with     Blood Draw     Labs drawn via  by RN in lab. VS taken.      Oswaldo Andrade RN

## 2019-05-24 ENCOUNTER — HOSPITAL ENCOUNTER (OUTPATIENT)
Dept: LAB | Facility: CLINIC | Age: 69
Discharge: HOME | End: 2019-05-24
Attending: INTERNAL MEDICINE | Admitting: INTERNAL MEDICINE
Payer: COMMERCIAL

## 2019-05-24 VITALS — HEART RATE: 67 BPM | TEMPERATURE: 97.8 F | RESPIRATION RATE: 18 BRPM

## 2019-05-24 LAB
BASOPHILS # BLD AUTO: 0 10E9/L (ref 0–0.2)
BASOPHILS NFR BLD AUTO: 0.4 %
DIFFERENTIAL METHOD BLD: ABNORMAL
EOSINOPHIL # BLD AUTO: 0.1 10E9/L (ref 0–0.7)
EOSINOPHIL NFR BLD AUTO: 1.1 %
ERYTHROCYTE [DISTWIDTH] IN BLOOD BY AUTOMATED COUNT: 12.1 % (ref 10–15)
HCT VFR BLD AUTO: 44.8 % (ref 40–53)
HEMOGLOBIN: 18.1 G/DL (ref 13.3–17.7)
HGB BLD-MCNC: 16.3 G/DL (ref 13.3–17.7)
IMM GRANULOCYTES # BLD: 0 10E9/L (ref 0–0.4)
IMM GRANULOCYTES NFR BLD: 0.4 %
LYMPHOCYTES # BLD AUTO: 3.1 10E9/L (ref 0.8–5.3)
LYMPHOCYTES NFR BLD AUTO: 31.3 %
MCH RBC QN AUTO: 33.1 PG (ref 26.5–33)
MCHC RBC AUTO-ENTMCNC: 36.4 G/DL (ref 31.5–36.5)
MCV RBC AUTO: 91 FL (ref 78–100)
MONOCYTES # BLD AUTO: 0.9 10E9/L (ref 0–1.3)
MONOCYTES NFR BLD AUTO: 9.3 %
NEUTROPHILS # BLD AUTO: 5.7 10E9/L (ref 1.6–8.3)
NEUTROPHILS NFR BLD AUTO: 57.5 %
NRBC # BLD AUTO: 0 10*3/UL
NRBC BLD AUTO-RTO: 0 /100
PLATELET # BLD AUTO: 115 10E9/L (ref 150–450)
RBC # BLD AUTO: 4.93 10E12/L (ref 4.4–5.9)
WBC # BLD AUTO: 9.9 10E9/L (ref 4–11)

## 2019-05-24 PROCEDURE — 99195 PHLEBOTOMY: CPT | Mod: ZF

## 2019-05-24 PROCEDURE — 85025 COMPLETE CBC W/AUTO DIFF WBC: CPT | Performed by: PATHOLOGY

## 2019-05-24 PROCEDURE — 85018 HEMOGLOBIN: CPT | Mod: ZF | Performed by: PATHOLOGY

## 2019-05-24 NOTE — DISCHARGE INSTRUCTIONS
Therapeutic Phlebotomy :  If you feel faint or dizzy, lie down or sit with your head between your knees until the feeling disappears.    Drink an extra four glasses (8 ounces each) of non-alcoholic liquids.   Apheresis Blood Donor Center Post Instructions  You may feel tired after your procedure today.   Please call your doctor if you have:  bleeding that doesn t stop, fever, pain where a needle or tube (catheter) was placed, seizures, trouble breathing, red urine, nausea or vomiting, other health concerns.     If your symptoms are severe, call 911.  If your veins were used, keep the bandages on for 2-4 hours.  Avoid heavy lifting with your arms.  If bleeding occurs from these sites, apply firm pressure for 5-10 minutes.  Call your physician if bleeding continues.    The Apheresis/Blood Donor Center is open Monday-Friday 7:30 a.m. to 5 p.m.  The phone number is [phone].  A Transfusion Medicine physician can be reached after 5:00 p.m. weekdays and on weekends /Holidays by calling [phone], and asking for the physician on call.

## 2019-05-27 DIAGNOSIS — D68.61 ANTIPHOSPHOLIPID ANTIBODY SYNDROME (H): ICD-10-CM

## 2019-05-27 DIAGNOSIS — I48.91 ATRIAL FIBRILLATION, UNSPECIFIED TYPE (H): ICD-10-CM

## 2019-05-29 RX ORDER — WARFARIN SODIUM 2.5 MG/1
TABLET ORAL
Qty: 72 TABLET | Refills: 0 | Status: SHIPPED | OUTPATIENT
Start: 2019-05-29 | End: 2019-08-29

## 2019-06-27 ENCOUNTER — ANTICOAGULATION THERAPY VISIT (OUTPATIENT)
Dept: NURSING | Facility: CLINIC | Age: 69
End: 2019-06-27
Payer: COMMERCIAL

## 2019-06-27 DIAGNOSIS — Z79.01 LONG TERM CURRENT USE OF ANTICOAGULANT THERAPY: ICD-10-CM

## 2019-06-27 DIAGNOSIS — I48.0 PAROXYSMAL ATRIAL FIBRILLATION (H): ICD-10-CM

## 2019-06-27 LAB — INR POINT OF CARE: 2.7 (ref 0.86–1.14)

## 2019-06-27 PROCEDURE — 36416 COLLJ CAPILLARY BLOOD SPEC: CPT

## 2019-06-27 PROCEDURE — 85610 PROTHROMBIN TIME: CPT | Mod: QW

## 2019-06-27 PROCEDURE — 99207 ZZC NO CHARGE NURSE ONLY: CPT

## 2019-08-05 ENCOUNTER — ANTICOAGULATION THERAPY VISIT (OUTPATIENT)
Dept: NURSING | Facility: CLINIC | Age: 69
End: 2019-08-05
Payer: COMMERCIAL

## 2019-08-05 DIAGNOSIS — I48.0 PAROXYSMAL ATRIAL FIBRILLATION (H): ICD-10-CM

## 2019-08-05 DIAGNOSIS — Z79.01 LONG TERM CURRENT USE OF ANTICOAGULANT THERAPY: ICD-10-CM

## 2019-08-05 LAB — INR POINT OF CARE: 1.8 (ref 0.86–1.14)

## 2019-08-05 PROCEDURE — 85610 PROTHROMBIN TIME: CPT | Mod: QW

## 2019-08-05 PROCEDURE — 36416 COLLJ CAPILLARY BLOOD SPEC: CPT

## 2019-08-05 PROCEDURE — 99207 ZZC NO CHARGE NURSE ONLY: CPT

## 2019-08-05 NOTE — PROGRESS NOTES
ANTICOAGULATION FOLLOW-UP CLINIC VISIT    Patient Name:  Haresh Rock  Date:  2019  Contact Type:  Face to Face    SUBJECTIVE:         OBJECTIVE    INR Protime   Date Value Ref Range Status   2019 1.8 (A) 0.86 - 1.14 Final     Factor 2 Assay   Date Value Ref Range Status   2007 58 (L) 60 - 140 % Final     Comment:     (Note)  CALLED TO TAMARA(INTERV. RADIOLOGY)[id number],        ASSESSMENT / PLAN  No question data found.  Anticoagulation Summary  As of 2019    INR goal:   2.0-3.0   TTR:   80.0 % (3.3 y)   INR used for dosin.8! (2019)   Warfarin maintenance plan:   1.25 mg (2.5 mg x 0.5) every Mon, Fri; 2.5 mg (2.5 mg x 1) all other days   Full warfarin instructions:   1.25 mg every Mon, Fri; 2.5 mg all other days   Weekly warfarin total:   15 mg   No change documented:   Laverne Ferguson RN   Plan last modified:   Laverne Ferguson RN (2018)   Next INR check:   2019   Priority:   INR   Target end date:   Indefinite    Indications    Long-term (current) use of anticoagulants [Z79.01] [Z79.01]  Atrial fibrillation (H) [I48.91]             Anticoagulation Episode Summary     INR check location:       Preferred lab:       Send INR reminders to:   CHELSEA CAMACHO    Comments:         Anticoagulation Care Providers     Provider Role Specialty Phone number    Anya Nowak MD [facility] [phone]            See the Encounter Report to view Anticoagulation Flowsheet and Dosing Calendar (Go to Encounters tab in chart review, and find the Anticoagulation Therapy Visit)        Laverne Ferguson RN

## 2019-08-05 NOTE — PATIENT INSTRUCTIONS
[facility]:  Please call [phone] to cancel and/or reschedule your appointment   Please call [phone] with any problems or questions regarding your Warfarin therapy.

## 2019-08-09 DIAGNOSIS — I47.10 PAROXYSMAL SUPRAVENTRICULAR TACHYCARDIA (H): ICD-10-CM

## 2019-08-09 DIAGNOSIS — I48.0 PAROXYSMAL ATRIAL FIBRILLATION (H): ICD-10-CM

## 2019-08-12 RX ORDER — FLECAINIDE ACETATE 50 MG/1
50 TABLET ORAL 2 TIMES DAILY
Start: 2019-08-12

## 2019-08-12 NOTE — TELEPHONE ENCOUNTER
"Routing refill request to provider for review/approval because:  See blow  Not on [facility] refill protocol    Requested Prescriptions   Pending Prescriptions Disp Refills     flecainide (TAMBOCOR) 50 MG tablet 180 tablet 1     Sig: Take 1 tablet (50 mg) by mouth 2 times daily       Anti Arrhythmic Agents Protocol Failed - 8/9/2019  2:23 PM        Failed - Medication needs approval from authorizing provider     Please forward this request to the authorizing provider for approval.           Passed - Lipid Panel on file in past year     Recent Labs   Lab Test 04/23/19  0846  11/16/15  1155   CHOL 128   < > 126   TRIG 143   < > 119   HDL 31*   < > 31*   LDL 68   < > 71   NHDL 97   < >  --    VLDL  --   --  24   CHOLHDLRATIO  --   --  4.1    < > = values in this interval not displayed.               Passed - CBC on file in past year     Recent Labs   Lab Test 05/24/19  1420   WBC 9.9   RBC 4.93   HGB 16.3   HCT 44.8   *                 Passed - ALT on file in past year     Recent Labs   Lab Test 05/21/19  1045   ALT 33             Passed - Serum Creatinine on file in past year     Recent Labs   Lab Test 05/21/19  1045  11/17/17  0957  05/06/14  1203   CR 1.09   < >  --    < >  --    CREAT  --   --  1.1  --   --    CRPOC  --   --   --   --  1.1    < > = values in this interval not displayed.             Passed - Serum Sodium on file in past year     Recent Labs   Lab Test 05/21/19  1045                 Passed - Serum Potassium on file in past year     Recent Labs   Lab Test 05/21/19  1045   POTASSIUM 4.3             Passed - Patient is 18 years of age or older        Passed - Recent (6 mo) or future (30 days) visit with authorizing provider's specialty     Patient had office visit in the last 6 months or has a visit in the next 30 days with authorizing provider.  See \"Patient Info\" tab in inbasket, or \"Choose Columns\" in Meds & Orders section of the refill encounter.            Pily Limon RN  "

## 2019-08-12 NOTE — TELEPHONE ENCOUNTER
Refused Prescriptions:                       Disp   Refills    flecainide (TAMBOCOR) 50 MG tablet                         Sig: Take 1 tablet (50 mg) by mouth 2 times daily  Refused By: PROMISE FENTON  Reason for Refusal: Originating/Specialty Provider to approve

## 2019-08-20 ENCOUNTER — ONCOLOGY VISIT (OUTPATIENT)
Dept: TRANSPLANT | Facility: CLINIC | Age: 69
End: 2019-08-20
Attending: INTERNAL MEDICINE
Payer: COMMERCIAL

## 2019-08-20 ENCOUNTER — APPOINTMENT (OUTPATIENT)
Dept: LAB | Facility: CLINIC | Age: 69
End: 2019-08-20
Attending: INTERNAL MEDICINE
Payer: COMMERCIAL

## 2019-08-20 ENCOUNTER — ANTICOAGULATION THERAPY VISIT (OUTPATIENT)
Dept: FAMILY MEDICINE | Facility: CLINIC | Age: 69
End: 2019-08-20

## 2019-08-20 VITALS
TEMPERATURE: 97.9 F | OXYGEN SATURATION: 98 % | DIASTOLIC BLOOD PRESSURE: 79 MMHG | RESPIRATION RATE: 16 BRPM | WEIGHT: 207.23 LBS | BODY MASS INDEX: 28.5 KG/M2 | HEART RATE: 66 BPM | SYSTOLIC BLOOD PRESSURE: 137 MMHG

## 2019-08-20 DIAGNOSIS — Z79.01 LONG TERM CURRENT USE OF ANTICOAGULANT THERAPY: ICD-10-CM

## 2019-08-20 DIAGNOSIS — E83.110 HEREDITARY HEMOCHROMATOSIS (H): ICD-10-CM

## 2019-08-20 DIAGNOSIS — C85.90 LYMPHOMA, UNSPECIFIED BODY REGION, UNSPECIFIED LYMPHOMA TYPE (H): ICD-10-CM

## 2019-08-20 DIAGNOSIS — I48.0 PAROXYSMAL ATRIAL FIBRILLATION (H): ICD-10-CM

## 2019-08-20 DIAGNOSIS — Z94.81 STATUS POST BONE MARROW TRANSPLANT (H): ICD-10-CM

## 2019-08-20 LAB
ALBUMIN SERPL-MCNC: 3.7 G/DL (ref 3.4–5)
ALP SERPL-CCNC: 110 U/L (ref 40–150)
ALT SERPL W P-5'-P-CCNC: 24 U/L (ref 0–70)
ANION GAP SERPL CALCULATED.3IONS-SCNC: 3 MMOL/L (ref 3–14)
AST SERPL W P-5'-P-CCNC: 27 U/L (ref 0–45)
BASOPHILS # BLD AUTO: 0.1 10E9/L (ref 0–0.2)
BASOPHILS NFR BLD AUTO: 0.5 %
BILIRUB SERPL-MCNC: 1 MG/DL (ref 0.2–1.3)
BUN SERPL-MCNC: 20 MG/DL (ref 7–30)
CALCIUM SERPL-MCNC: 8.5 MG/DL (ref 8.5–10.1)
CHLORIDE SERPL-SCNC: 104 MMOL/L (ref 94–109)
CO2 SERPL-SCNC: 28 MMOL/L (ref 20–32)
CREAT SERPL-MCNC: 1.08 MG/DL (ref 0.66–1.25)
DIFFERENTIAL METHOD BLD: ABNORMAL
EOSINOPHIL # BLD AUTO: 0.1 10E9/L (ref 0–0.7)
EOSINOPHIL NFR BLD AUTO: 1 %
ERYTHROCYTE [DISTWIDTH] IN BLOOD BY AUTOMATED COUNT: 11.9 % (ref 10–15)
FERRITIN SERPL-MCNC: 68 NG/ML (ref 26–388)
GFR SERPL CREATININE-BSD FRML MDRD: 69 ML/MIN/{1.73_M2}
GLUCOSE SERPL-MCNC: 151 MG/DL (ref 70–99)
HCT VFR BLD AUTO: 48.8 % (ref 40–53)
HGB BLD-MCNC: 17.7 G/DL (ref 13.3–17.7)
IMM GRANULOCYTES # BLD: 0 10E9/L (ref 0–0.4)
IMM GRANULOCYTES NFR BLD: 0.2 %
INR PPP: 1.98 (ref 0.86–1.14)
IRON SATN MFR SERPL: 84 % (ref 15–46)
IRON SERPL-MCNC: 231 UG/DL (ref 35–180)
LYMPHOCYTES # BLD AUTO: 2.8 10E9/L (ref 0.8–5.3)
LYMPHOCYTES NFR BLD AUTO: 30.9 %
MCH RBC QN AUTO: 33.4 PG (ref 26.5–33)
MCHC RBC AUTO-ENTMCNC: 36.3 G/DL (ref 31.5–36.5)
MCV RBC AUTO: 92 FL (ref 78–100)
MONOCYTES # BLD AUTO: 0.8 10E9/L (ref 0–1.3)
MONOCYTES NFR BLD AUTO: 8.6 %
NEUTROPHILS # BLD AUTO: 5.4 10E9/L (ref 1.6–8.3)
NEUTROPHILS NFR BLD AUTO: 58.8 %
NRBC # BLD AUTO: 0 10*3/UL
NRBC BLD AUTO-RTO: 0 /100
PLATELET # BLD AUTO: 122 10E9/L (ref 150–450)
POTASSIUM SERPL-SCNC: 4.3 MMOL/L (ref 3.4–5.3)
PROT SERPL-MCNC: 7.6 G/DL (ref 6.8–8.8)
RBC # BLD AUTO: 5.3 10E12/L (ref 4.4–5.9)
SODIUM SERPL-SCNC: 135 MMOL/L (ref 133–144)
TIBC SERPL-MCNC: 276 UG/DL (ref 240–430)
WBC # BLD AUTO: 9.1 10E9/L (ref 4–11)

## 2019-08-20 PROCEDURE — 83540 ASSAY OF IRON: CPT | Performed by: INTERNAL MEDICINE

## 2019-08-20 PROCEDURE — 80053 COMPREHEN METABOLIC PANEL: CPT | Performed by: INTERNAL MEDICINE

## 2019-08-20 PROCEDURE — 82728 ASSAY OF FERRITIN: CPT | Performed by: INTERNAL MEDICINE

## 2019-08-20 PROCEDURE — 85610 PROTHROMBIN TIME: CPT | Performed by: INTERNAL MEDICINE

## 2019-08-20 PROCEDURE — 36415 COLL VENOUS BLD VENIPUNCTURE: CPT

## 2019-08-20 PROCEDURE — 99207 ZZC NO CHARGE NURSE ONLY: CPT | Performed by: FAMILY MEDICINE

## 2019-08-20 PROCEDURE — G0463 HOSPITAL OUTPT CLINIC VISIT: HCPCS | Mod: ZF

## 2019-08-20 PROCEDURE — 83550 IRON BINDING TEST: CPT | Performed by: INTERNAL MEDICINE

## 2019-08-20 PROCEDURE — 85025 COMPLETE CBC W/AUTO DIFF WBC: CPT | Performed by: INTERNAL MEDICINE

## 2019-08-20 ASSESSMENT — PAIN SCALES - GENERAL: PAINLEVEL: NO PAIN (0)

## 2019-08-20 NOTE — NURSING NOTE
"Oncology Rooming Note    August 20, 2019 11:42 AM   Haresh Rock is a 69 year old male who presents for:    Chief Complaint   Patient presents with     Blood Draw     Labs drawn by RN in Lab via Right Arm VPT.      RECHECK     Return: Hereditary Hemochromatosis      Initial Vitals: /79   Pulse 66   Temp 97.9  F (36.6  C) (Oral)   Resp 16   Wt 94 kg (207 lb 3.7 oz)   SpO2 98%   BMI 28.50 kg/m   Estimated body mass index is 28.5 kg/m  as calculated from the following:    Height as of 4/23/19: 1.816 m (5' 11.5\").    Weight as of this encounter: 94 kg (207 lb 3.7 oz). Body surface area is 2.18 meters squared.  No Pain (0) Comment: Data Unavailable   No LMP for male patient.  Allergies reviewed: Yes  Medications reviewed: Yes    Medications: Medication refills not needed today.  Pharmacy name entered into EPIC:    [facility] #[id number] - KEANU CAMACHO - [address] [facility] [place] - [address] AT PORTAL TO REGISTERED [facility] SITES    Clinical concerns: Patient has question on the protonix.       Eileen Ramirez CMA              "

## 2019-08-20 NOTE — NURSING NOTE
Chief Complaint   Patient presents with     Blood Draw     Labs drawn by RN in Lab via Right Arm VPT.      Mayi Pulido RN

## 2019-08-20 NOTE — PROGRESS NOTES
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 No cardiac issue no arrhthymias since on felcanide no chest pain.He had a phlebotomy in May 2019 for elevated ferritin and iron sat 69%.Still on warfarin 2.5mg and 1.25 mg on Monday and Fridays No bleeding.     PAST MEDICAL HISTORY:  See my note from 05/2019    SOCIAL HISTORY:  See my note from 05/2019     FAMILY HISTORY:  See my note from  15/2019     REVIEW OF SYSTEMS:  A 12-point review of systems is done and is negative, except as in the HPI.      PHYSICAL EXAMINATION:   GENERAL:  He is an alert gentleman in no acute distress.   VITAL SIGNS:  Stable. /79   Pulse 66   Temp 97.9  F (36.6  C) (Oral)   Resp 16   Wt 94 kg (207 lb 3.7 oz)   SpO2 98%   BMI 28.50 kg/m      HEENT:  Normocephalic. EOM okay, no scleral icterus. Mouth without lesion.  RESPIRATORY:  Clear to percussion and auscultation.   LYMPH: No cervical, supraclavicular, submandibular, axillary or inguinal nodes.   CARDIAC: Normal sinus rhythm.  No S3, S4. 1-2/6 SAYDA best heard along the right sternal border with no radiation.  ABDOMEN:  Soft without hepatosplenomegaly.  SKIN:  Extremities appear warm and well-perfused without edema.  Extensive brown-black plaques noted on the back, concentrated more on the upper back.  EXT Abrasion left knee    Results for HARESH GRANADOS (MRN [id number]) as of 8/20/2019 11:47  INR 1.98 Ref. Range 8/20/2019 11:30   WBC Latest Ref Range: 4.0 - 11.0 10e9/L 9.1   Hemoglobin Latest Ref Range: 13.3 - 17.7 g/dL 17.7   Hematocrit Latest Ref Range: 40.0 - 53.0 % 48.8    Platelet Count Latest Ref Range: 150 - 450 10e9/L 122 (L)   RBC Count Latest Ref Range: 4.4 - 5.9 10e12/L 5.30   MCV Latest Ref Range: 78 - 100 fl 92   MCH Latest Ref Range: 26.5 - 33.0 pg 33.4 (H)   MCHC Latest Ref Range: 31.5 - 36.5 g/dL 36.3   RDW Latest Ref Range: 10.0 - 15.0 % 11.9   Diff Method Unknown Automated Method   % Neutrophils Latest Units: % 58.8   % Lymphocytes Latest Units: % 30.9   % Monocytes Latest Units: % 8.6   % Eosinophils Latest Units: % 1.0   % Basophils Latest Units: % 0.5   % Immature Granulocytes Latest Units: % 0.2   Nucleated RBCs Latest Ref Range: 0 /100 0   Absolute Neutrophil Latest Ref Range: 1.6 - 8.3 10e9/L 5.4   Absolute Lymphocytes Latest Ref Range: 0.8 - 5.3 10e9/L 2.8   Absolute Monocytes Latest Ref Range: 0.0 - 1.3 10e9/L 0.8   Absolute Eosinophils Latest Ref Range: 0.0 - 0.7 10e9/L 0.1   Absolute Basophils Latest Ref Range: 0.0 - 0.2 10e9/L 0.1   Abs Immature Granulocytes Latest Ref Range: 0 - 0.4 10e9/L 0.0   Absolute Nucleated RBC Unknown 0.0   IResults for PAMELA GRANADOS (MRN [id number]) as of 8/20/2019 17:26   Ref. Range 8/20/2019 11:30   Sodium Latest Ref Range: 133 - 144 mmol/L 135   Potassium Latest Ref Range: 3.4 - 5.3 mmol/L 4.3   Chloride Latest Ref Range: 94 - 109 mmol/L 104   Carbon Dioxide Latest Ref Range: 20 - 32 mmol/L 28   Urea Nitrogen Latest Ref Range: 7 - 30 mg/dL 20   Creatinine Latest Ref Range: 0.66 - 1.25 mg/dL 1.08   GFR Estimate Latest Ref Range: >60 mL/min/1.73_m2 69   GFR Estimate If Black Latest Ref Range: >60 mL/min/1.73_m2 79   Calcium Latest Ref Range: 8.5 - 10.1 mg/dL 8.5   Anion Gap Latest Ref Range: 3 - 14 mmol/L 3   Albumin Latest Ref Range: 3.4 - 5.0 g/dL 3.7   Protein Total Latest Ref Range: 6.8 - 8.8 g/dL 7.6   Bilirubin Total Latest Ref Range: 0.2 - 1.3 mg/dL 1.0   Alkaline Phosphatase Latest Ref Range: 40 - 150 U/L 110   ALT Latest Ref Range: 0 - 70 U/L 24   AST Latest Ref Range: 0 - 45 U/L 27   Ferritin Latest Ref Range: 26 - 388  ng/mL 68   Iron Latest Ref Range: 35 - 180 ug/dL 231 (H)   Iron Binding Cap Latest Ref Range: 240 - 430 ug/dL 276   Iron Saturation Index Latest Ref Range: 15 - 46 % 84 (H)   Glucose Latest Ref Range: 70 - 99 mg/dL 151 (H)       ASSESSMENT:     1.  Myeloproliferative syndrome/MDS.   2.  Status post non-myeloablative allo-sib peripheral blood stem cell transplant.   3.  History of chronic bukcl-dnkvlj-xdeq disease.   4.  History of Hodgkin's disease.   5.  History of cardiac arrhythmias, SVT and V tach.   6.  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   7.  History of cirrhosis.   8.  History of pulmonary emboli.   9.  History of elevated hemoglobin A1c.   10.  Thyroid nodule.    11.  Cholelithiasis.     12.  Diverticulosis.   13.  Anticoagulation.   14.  Status post bilateral knee replacement.     15.  Aortic stenosis  16. 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  17. Seborrheic keratosis of the back.     In general Haresh has been well. Hodgkin's lymphoma remains in remission. It looks like his cardiac rhythm is NSR,well-controlled so far with metoprolol+flecainide. Hgb high but has not had much fluids to drink today. I wonder if the phlebotomy had him overshoot the Epo and increase his red cell mass Ferritin down to 68 so no phleb now  I will repeat labs in 3 months given his hx of cirrhosis and liver GVH. Plan is to get iron panels in 3 months, and I will see him again in 6 months' time,

## 2019-08-23 ENCOUNTER — TELEPHONE (OUTPATIENT)
Dept: FAMILY MEDICINE | Facility: CLINIC | Age: 69
End: 2019-08-23

## 2019-08-23 NOTE — TELEPHONE ENCOUNTER
Left message for patient to call the clinic at [phone] to schedule an INR appointment. Was due for INR check on 8/21/19    Laverne Ferguson RN - BC

## 2019-08-29 DIAGNOSIS — D68.61 ANTIPHOSPHOLIPID ANTIBODY SYNDROME (H): ICD-10-CM

## 2019-08-29 DIAGNOSIS — I48.91 ATRIAL FIBRILLATION, UNSPECIFIED TYPE (H): ICD-10-CM

## 2019-08-29 RX ORDER — WARFARIN SODIUM 2.5 MG/1
TABLET ORAL
Qty: 24 TABLET | Refills: 0 | Status: SHIPPED | OUTPATIENT
Start: 2019-08-29 | End: 2019-09-26

## 2019-09-04 NOTE — PROGRESS NOTES
ANTICOAGULATION FOLLOW-UP CLINIC VISIT    Patient Name:  Haresh Rock  Date:  2019  Contact Type:  Telephone    SUBJECTIVE:  Patient Findings         Clinical Outcomes     Negatives:   Major bleeding event, Thromboembolic event, Anticoagulation-related hospital admission, Anticoagulation-related ED visit, Anticoagulation-related fatality           OBJECTIVE    INR   Date Value Ref Range Status   2019 1.98 (H) 0.86 - 1.14 Final     Factor 2 Assay   Date Value Ref Range Status   2007 58 (L) 60 - 140 % Final     Comment:     (Note)  CALLED TO TAMARA(INTERV. RADIOLOGY)[id number],        ASSESSMENT / PLAN  No question data found.  Anticoagulation Summary  As of 2019    INR goal:   2.0-3.0   TTR:   --   INR used for dosin.98! (2019)   Warfarin maintenance plan:   1.25 mg (2.5 mg x 0.5) every Mon, Fri; 2.5 mg (2.5 mg x 1) all other days   Full warfarin instructions:   1.25 mg every Mon, Fri; 2.5 mg all other days   Weekly warfarin total:   15 mg   No change documented:   Laverne Ferguson RN   Plan last modified:   Laverne Ferguson RN (2018)   Next INR check:   2019   Priority:   INR   Target end date:   Indefinite    Indications    Long-term (current) use of anticoagulants [Z79.01] [Z79.01]  Atrial fibrillation (H) [I48.91]             Anticoagulation Episode Summary     INR check location:       Preferred lab:       Send INR reminders to:   CHELSEA CAMACHO    Comments:         Anticoagulation Care Providers     Provider Role Specialty Phone number    Anya Nowak MD [facility] [phone]            See the Encounter Report to view Anticoagulation Flowsheet and Dosing Calendar (Go to Encounters tab in chart review, and find the Anticoagulation Therapy Visit)        Laverne Ferguson RN

## 2019-09-16 ENCOUNTER — ANTICOAGULATION THERAPY VISIT (OUTPATIENT)
Dept: NURSING | Facility: CLINIC | Age: 69
End: 2019-09-16
Payer: COMMERCIAL

## 2019-09-16 ENCOUNTER — TELEPHONE (OUTPATIENT)
Dept: FAMILY MEDICINE | Facility: CLINIC | Age: 69
End: 2019-09-16

## 2019-09-16 DIAGNOSIS — Z79.01 LONG TERM CURRENT USE OF ANTICOAGULANT THERAPY: ICD-10-CM

## 2019-09-16 DIAGNOSIS — I48.0 PAROXYSMAL ATRIAL FIBRILLATION (H): ICD-10-CM

## 2019-09-16 LAB — INR POINT OF CARE: 2.2 (ref 0.86–1.14)

## 2019-09-16 PROCEDURE — 85610 PROTHROMBIN TIME: CPT | Mod: QW

## 2019-09-16 PROCEDURE — 99207 ZZC NO CHARGE NURSE ONLY: CPT

## 2019-09-16 PROCEDURE — 36416 COLLJ CAPILLARY BLOOD SPEC: CPT

## 2019-09-16 NOTE — PATIENT INSTRUCTIONS
Anticoagulation Clinic:  Please call [phone] with any problems or questions regarding your Warfarin therapy.

## 2019-09-16 NOTE — PROGRESS NOTES
ANTICOAGULATION FOLLOW-UP CLINIC VISIT    Patient Name:  Haresh Rock  Date:  2019  Contact Type:  Face to Face    SUBJECTIVE:  Patient Findings         Clinical Outcomes     Negatives:   Major bleeding event, Thromboembolic event, Anticoagulation-related hospital admission, Anticoagulation-related ED visit, Anticoagulation-related fatality           OBJECTIVE    INR Protime   Date Value Ref Range Status   2019 2.2 (A) 0.86 - 1.14 Final     Factor 2 Assay   Date Value Ref Range Status   2007 58 (L) 60 - 140 % Final     Comment:     (Note)  CALLED TO TAMARA(INTERV. RADIOLOGY)[id number],        ASSESSMENT / PLAN  No question data found.  Anticoagulation Summary  As of 2019    INR goal:   2.0-3.0   TTR:   79.3 % (3.4 y)   INR used for dosin.2 (2019)   Warfarin maintenance plan:   1.25 mg (2.5 mg x 0.5) every Mon, Fri; 2.5 mg (2.5 mg x 1) all other days   Full warfarin instructions:   1.25 mg every Mon, Fri; 2.5 mg all other days   Weekly warfarin total:   15 mg   No change documented:   Laverne Ferguson RN   Plan last modified:   Laverne Ferguson RN (2018)   Next INR check:   10/14/2019   Priority:   INR   Target end date:   Indefinite    Indications    Long-term (current) use of anticoagulants [Z79.01] [Z79.01]  Atrial fibrillation (H) [I48.91]             Anticoagulation Episode Summary     INR check location:       Preferred lab:       Send INR reminders to:   CHELSEA CAMACHO    Comments:         Anticoagulation Care Providers     Provider Role Specialty Phone number    Anya Nowak MD [facility] [phone]            See the Encounter Report to view Anticoagulation Flowsheet and Dosing Calendar (Go to Encounters tab in chart review, and find the Anticoagulation Therapy Visit)        Laverne Ferguson RN

## 2019-09-16 NOTE — TELEPHONE ENCOUNTER
Left message advising of missed appointment for today and to call [phone] to reschedule    Laverne Ferguson RN - BC

## 2019-09-25 DIAGNOSIS — I48.91 ATRIAL FIBRILLATION, UNSPECIFIED TYPE (H): ICD-10-CM

## 2019-09-25 DIAGNOSIS — D68.61 ANTIPHOSPHOLIPID ANTIBODY SYNDROME (H): ICD-10-CM

## 2019-09-26 RX ORDER — WARFARIN SODIUM 2.5 MG/1
TABLET ORAL
Qty: 72 TABLET | Refills: 0 | Status: SHIPPED | OUTPATIENT
Start: 2019-09-26 | End: 2020-01-10

## 2019-10-01 ENCOUNTER — HEALTH MAINTENANCE LETTER (OUTPATIENT)
Age: 69
End: 2019-10-01

## 2019-10-14 ENCOUNTER — ANTICOAGULATION THERAPY VISIT (OUTPATIENT)
Dept: NURSING | Facility: CLINIC | Age: 69
End: 2019-10-14
Payer: COMMERCIAL

## 2019-10-14 DIAGNOSIS — Z79.01 LONG TERM CURRENT USE OF ANTICOAGULANT THERAPY: ICD-10-CM

## 2019-10-14 DIAGNOSIS — I48.0 PAROXYSMAL ATRIAL FIBRILLATION (H): ICD-10-CM

## 2019-10-14 LAB — INR POINT OF CARE: 2.1 (ref 0.86–1.14)

## 2019-10-14 PROCEDURE — 99207 ZZC NO CHARGE NURSE ONLY: CPT

## 2019-10-14 PROCEDURE — 85610 PROTHROMBIN TIME: CPT | Mod: QW

## 2019-10-14 PROCEDURE — 36416 COLLJ CAPILLARY BLOOD SPEC: CPT

## 2019-10-14 NOTE — PROGRESS NOTES
ANTICOAGULATION FOLLOW-UP CLINIC VISIT    Patient Name:  Haresh Rock  Date:  10/14/2019  Contact Type:  Face to Face    SUBJECTIVE:  Patient Findings         Clinical Outcomes     Negatives:   Major bleeding event, Thromboembolic event, Anticoagulation-related hospital admission, Anticoagulation-related ED visit, Anticoagulation-related fatality           OBJECTIVE    INR Protime   Date Value Ref Range Status   10/14/2019 2.1 (A) 0.86 - 1.14 Final     Factor 2 Assay   Date Value Ref Range Status   2007 58 (L) 60 - 140 % Final     Comment:     (Note)  CALLED TO TAMARA(INTERV. RADIOLOGY)[id number],        ASSESSMENT / PLAN  No question data found.  Anticoagulation Summary  As of 10/14/2019    INR goal:   2.0-3.0   TTR:   79.7 % (3.5 y)   INR used for dosin.1 (10/14/2019)   Warfarin maintenance plan:   1.25 mg (2.5 mg x 0.5) every Mon, Fri; 2.5 mg (2.5 mg x 1) all other days   Full warfarin instructions:   1.25 mg every Mon, Fri; 2.5 mg all other days   Weekly warfarin total:   15 mg   Plan last modified:   Laverne Ferguson RN (2018)   Next INR check:   2019   Priority:   INR   Target end date:   Indefinite    Indications    Long-term (current) use of anticoagulants [Z79.01] [Z79.01]  Atrial fibrillation (H) [I48.91]             Anticoagulation Episode Summary     INR check location:       Preferred lab:       Send INR reminders to:   CHELSEA CAMACHO    Comments:         Anticoagulation Care Providers     Provider Role Specialty Phone number    She Anya Solano MD [facility] [phone]            See the Encounter Report to view Anticoagulation Flowsheet and Dosing Calendar (Go to Encounters tab in chart review, and find the Anticoagulation Therapy Visit)        Laverne Ferguson RN

## 2019-10-14 NOTE — PATIENT INSTRUCTIONS
Anticoagulation Clinic:  Please call [phone] with any problems or questions regarding your Warfarin therapy.

## 2019-11-13 DIAGNOSIS — E83.110 HEREDITARY HEMOCHROMATOSIS (H): ICD-10-CM

## 2019-11-13 DIAGNOSIS — Z79.01 LONG TERM CURRENT USE OF ANTICOAGULANT THERAPY: ICD-10-CM

## 2019-11-13 DIAGNOSIS — C85.90 LYMPHOMA, UNSPECIFIED BODY REGION, UNSPECIFIED LYMPHOMA TYPE (H): ICD-10-CM

## 2019-11-13 DIAGNOSIS — Z94.81 STATUS POST BONE MARROW TRANSPLANT (H): ICD-10-CM

## 2019-11-13 LAB
ALBUMIN SERPL-MCNC: 3.6 G/DL (ref 3.4–5)
ALP SERPL-CCNC: 121 U/L (ref 40–150)
ALT SERPL W P-5'-P-CCNC: 26 U/L (ref 0–70)
ANION GAP SERPL CALCULATED.3IONS-SCNC: 3 MMOL/L (ref 3–14)
AST SERPL W P-5'-P-CCNC: 28 U/L (ref 0–45)
BASOPHILS # BLD AUTO: 0 10E9/L (ref 0–0.2)
BASOPHILS NFR BLD AUTO: 0.3 %
BILIRUB SERPL-MCNC: 1 MG/DL (ref 0.2–1.3)
BUN SERPL-MCNC: 20 MG/DL (ref 7–30)
CALCIUM SERPL-MCNC: 8.8 MG/DL (ref 8.5–10.1)
CHLORIDE SERPL-SCNC: 103 MMOL/L (ref 94–109)
CO2 SERPL-SCNC: 31 MMOL/L (ref 20–32)
CREAT SERPL-MCNC: 1.22 MG/DL (ref 0.66–1.25)
DIFFERENTIAL METHOD BLD: ABNORMAL
EOSINOPHIL # BLD AUTO: 0.2 10E9/L (ref 0–0.7)
EOSINOPHIL NFR BLD AUTO: 1.6 %
ERYTHROCYTE [DISTWIDTH] IN BLOOD BY AUTOMATED COUNT: 12.9 % (ref 10–15)
FERRITIN SERPL-MCNC: 93 NG/ML (ref 26–388)
GFR SERPL CREATININE-BSD FRML MDRD: 59 ML/MIN/{1.73_M2}
GLUCOSE SERPL-MCNC: 207 MG/DL (ref 70–99)
HCT VFR BLD AUTO: 47.1 % (ref 40–53)
HGB BLD-MCNC: 17 G/DL (ref 13.3–17.7)
INR PPP: 2.31 (ref 0.86–1.14)
IRON SATN MFR SERPL: 88 % (ref 15–46)
IRON SERPL-MCNC: 213 UG/DL (ref 35–180)
LYMPHOCYTES # BLD AUTO: 3.6 10E9/L (ref 0.8–5.3)
LYMPHOCYTES NFR BLD AUTO: 36.6 %
MCH RBC QN AUTO: 32.8 PG (ref 26.5–33)
MCHC RBC AUTO-ENTMCNC: 36.1 G/DL (ref 31.5–36.5)
MCV RBC AUTO: 91 FL (ref 78–100)
MONOCYTES # BLD AUTO: 0.8 10E9/L (ref 0–1.3)
MONOCYTES NFR BLD AUTO: 8.2 %
NEUTROPHILS # BLD AUTO: 5.3 10E9/L (ref 1.6–8.3)
NEUTROPHILS NFR BLD AUTO: 53.3 %
PLATELET # BLD AUTO: 118 10E9/L (ref 150–450)
POTASSIUM SERPL-SCNC: 4.1 MMOL/L (ref 3.4–5.3)
PROT SERPL-MCNC: 7.4 G/DL (ref 6.8–8.8)
RBC # BLD AUTO: 5.19 10E12/L (ref 4.4–5.9)
SODIUM SERPL-SCNC: 137 MMOL/L (ref 133–144)
TIBC SERPL-MCNC: 243 UG/DL (ref 240–430)
WBC # BLD AUTO: 9.9 10E9/L (ref 4–11)

## 2019-11-13 PROCEDURE — 80053 COMPREHEN METABOLIC PANEL: CPT | Performed by: INTERNAL MEDICINE

## 2019-11-13 PROCEDURE — 36415 COLL VENOUS BLD VENIPUNCTURE: CPT | Performed by: INTERNAL MEDICINE

## 2019-11-13 PROCEDURE — 83550 IRON BINDING TEST: CPT | Performed by: INTERNAL MEDICINE

## 2019-11-13 PROCEDURE — 83540 ASSAY OF IRON: CPT | Performed by: INTERNAL MEDICINE

## 2019-11-13 PROCEDURE — 85610 PROTHROMBIN TIME: CPT | Performed by: INTERNAL MEDICINE

## 2019-11-13 PROCEDURE — 85025 COMPLETE CBC W/AUTO DIFF WBC: CPT | Performed by: INTERNAL MEDICINE

## 2019-11-13 PROCEDURE — 82728 ASSAY OF FERRITIN: CPT | Performed by: INTERNAL MEDICINE

## 2019-11-15 DIAGNOSIS — I47.10 PAROXYSMAL SUPRAVENTRICULAR TACHYCARDIA (H): ICD-10-CM

## 2019-11-15 DIAGNOSIS — I48.0 PAROXYSMAL ATRIAL FIBRILLATION (H): ICD-10-CM

## 2019-11-15 RX ORDER — METOPROLOL TARTRATE 25 MG/1
25 TABLET, FILM COATED ORAL 2 TIMES DAILY
Qty: 180 TABLET | Refills: 0 | Status: SHIPPED | OUTPATIENT
Start: 2019-11-15 | End: 2020-01-10

## 2019-11-25 ENCOUNTER — ANTICOAGULATION THERAPY VISIT (OUTPATIENT)
Dept: FAMILY MEDICINE | Facility: CLINIC | Age: 69
End: 2019-11-25

## 2019-11-25 DIAGNOSIS — Z79.01 LONG TERM CURRENT USE OF ANTICOAGULANT THERAPY: ICD-10-CM

## 2019-11-25 DIAGNOSIS — I48.0 PAROXYSMAL ATRIAL FIBRILLATION (H): ICD-10-CM

## 2019-11-25 PROCEDURE — 99207 ZZC NO CHARGE NURSE ONLY: CPT | Performed by: FAMILY MEDICINE

## 2019-11-25 NOTE — PROGRESS NOTES
ANTICOAGULATION FOLLOW-UP CLINIC VISIT    Patient Name:  Haresh Rock  Date:  2019  Contact Type:  [employer]    SUBJECTIVE:  Patient Findings     Comments:   The patient was assessed for diet, medication, and activity level changes, missed or extra doses, bruising or bleeding, with no problem findings.          Clinical Outcomes     Negatives:   Major bleeding event, Thromboembolic event, Anticoagulation-related hospital admission, Anticoagulation-related ED visit, Anticoagulation-related fatality    Comments:   The patient was assessed for diet, medication, and activity level changes, missed or extra doses, bruising or bleeding, with no problem findings.             OBJECTIVE    INR   Date Value Ref Range Status   2019 2.31 (H) 0.86 - 1.14 Final     Factor 2 Assay   Date Value Ref Range Status   2007 58 (L) 60 - 140 % Final     Comment:     (Note)  CALLED TO TAMARA(INTERV. RADIOLOGY)[id number],        ASSESSMENT / PLAN  No question data found.  Anticoagulation Summary  As of 2019    INR goal:   2.0-3.0   TTR:   92.5 % (11.8 mo)   INR used for dosin.31 (2019)   Warfarin maintenance plan:   1.25 mg (2.5 mg x 0.5) every Mon, Fri; 2.5 mg (2.5 mg x 1) all other days   Full warfarin instructions:   1.25 mg every Mon, Fri; 2.5 mg all other days   Weekly warfarin total:   15 mg   No change documented:   Laverne Ferguson RN   Plan last modified:   Laverne Ferguson RN (2018)   Next INR check:   2019   Priority:   INR   Target end date:   Indefinite    Indications    Long-term (current) use of anticoagulants [Z79.01] [Z79.01]  Atrial fibrillation (H) [I48.91]             Anticoagulation Episode Summary     INR check location:       Preferred lab:       Send INR reminders to:   CHELSEA CAMACHO    Comments:         Anticoagulation Care Providers     Provider Role Specialty Phone number    Anya Nowak MD [facility] [phone]            See the  Encounter Report to view Anticoagulation Flowsheet and Dosing Calendar (Go to Encounters tab in chart review, and find the Anticoagulation Therapy Visit)        Laverne Ferguson RN

## 2019-12-15 ENCOUNTER — HEALTH MAINTENANCE LETTER (OUTPATIENT)
Age: 69
End: 2019-12-15

## 2019-12-23 ENCOUNTER — ANTICOAGULATION THERAPY VISIT (OUTPATIENT)
Dept: NURSING | Facility: CLINIC | Age: 69
End: 2019-12-23
Payer: COMMERCIAL

## 2019-12-23 DIAGNOSIS — Z79.01 LONG TERM CURRENT USE OF ANTICOAGULANT THERAPY: ICD-10-CM

## 2019-12-23 DIAGNOSIS — I48.0 PAROXYSMAL ATRIAL FIBRILLATION (H): ICD-10-CM

## 2019-12-23 LAB — INR POINT OF CARE: 2 (ref 0.86–1.14)

## 2019-12-23 PROCEDURE — 36416 COLLJ CAPILLARY BLOOD SPEC: CPT

## 2019-12-23 PROCEDURE — 99207 ZZC NO CHARGE NURSE ONLY: CPT

## 2019-12-23 PROCEDURE — 85610 PROTHROMBIN TIME: CPT | Mod: QW

## 2019-12-23 NOTE — PATIENT INSTRUCTIONS
Anticoagulation Clinic:  Please call [phone] with any problems or questions regarding your Warfarin therapy.

## 2020-01-01 ENCOUNTER — TRANSFERRED RECORDS (OUTPATIENT)
Dept: MULTI SPECIALTY CLINIC | Facility: CLINIC | Age: 70
End: 2020-01-01

## 2020-01-01 LAB — RETINOPATHY: NORMAL

## 2020-01-08 DIAGNOSIS — I48.0 PAROXYSMAL ATRIAL FIBRILLATION (H): ICD-10-CM

## 2020-01-08 DIAGNOSIS — I47.10 PAROXYSMAL SUPRAVENTRICULAR TACHYCARDIA (H): ICD-10-CM

## 2020-01-08 DIAGNOSIS — D68.61 ANTIPHOSPHOLIPID ANTIBODY SYNDROME (H): ICD-10-CM

## 2020-01-08 DIAGNOSIS — I48.91 ATRIAL FIBRILLATION, UNSPECIFIED TYPE (H): ICD-10-CM

## 2020-01-08 NOTE — TELEPHONE ENCOUNTER
Disp Refills Start End KRIS   metoprolol tartrate (LOPRESSOR) 25 MG tablet 180 tablet 0 11/15/2019  No   Sig - Route: Take 1 tablet (25 mg) by mouth 2 times daily - Oral   Sent to pharmacy as: metoprolol tartrate (LOPRESSOR) 25 MG tablet   Class: E-Prescribe   Order: [id number]   E-Prescribing Status: Receipt confirmed by pharmacy (11/15/2019 12:40 PM CST)      Disp Refills Start End KRIS   warfarin ANTICOAGULANT (COUMADIN) 2.5 MG tablet 72 tablet 0 9/26/2019  No   Sig: Take 1.25 mg (1/2 tablet) Mon/Fri and 2.5 mg (1 tablet) all other days   Sent to pharmacy as: Warfarin Sodium 2.5 MG Oral Tablet (COUMADIN)   Class: E-Prescribe   Order: [id number]   E-Prescribing Status: Receipt confirmed by pharmacy (9/26/2019  7:54 PM CDT)     Willa Dudley MA on 1/8/2020 at 2:13 PM

## 2020-01-10 ENCOUNTER — MYC REFILL (OUTPATIENT)
Dept: FAMILY MEDICINE | Facility: CLINIC | Age: 70
End: 2020-01-10

## 2020-01-10 DIAGNOSIS — D68.61 ANTIPHOSPHOLIPID ANTIBODY SYNDROME (H): ICD-10-CM

## 2020-01-10 DIAGNOSIS — I48.91 ATRIAL FIBRILLATION, UNSPECIFIED TYPE (H): ICD-10-CM

## 2020-01-10 RX ORDER — WARFARIN SODIUM 2.5 MG/1
TABLET ORAL
Qty: 72 TABLET | Refills: 0 | Status: SHIPPED | OUTPATIENT
Start: 2020-01-10 | End: 2020-04-01

## 2020-01-10 RX ORDER — WARFARIN SODIUM 2.5 MG/1
TABLET ORAL
Qty: 72 TABLET | Refills: 0 | Status: SHIPPED | OUTPATIENT
Start: 2020-01-10 | End: 2020-07-06

## 2020-01-10 RX ORDER — METOPROLOL TARTRATE 25 MG/1
TABLET, FILM COATED ORAL
Qty: 180 TABLET | Refills: 0 | Status: SHIPPED | OUTPATIENT
Start: 2020-01-10 | End: 2020-04-21

## 2020-01-10 NOTE — TELEPHONE ENCOUNTER
Anticoagulation Monitoring Return Date Recheck   Latest Ref Rng & Units     12/23/2019 2/3/2020      Anticoagulation Monitoring Instructions   Latest Ref Rng & Units    12/23/2019 1.25 mg every Mon, Fri; 2.5 mg all other days     Prescription approved per [facility] Refill Protocol.  Allyn Doe, RN  Anticoagulation Nurse - [facility]

## 2020-02-03 ENCOUNTER — ANTICOAGULATION THERAPY VISIT (OUTPATIENT)
Dept: NURSING | Facility: CLINIC | Age: 70
End: 2020-02-03
Payer: COMMERCIAL

## 2020-02-03 DIAGNOSIS — Z79.01 LONG TERM CURRENT USE OF ANTICOAGULANT THERAPY: ICD-10-CM

## 2020-02-03 DIAGNOSIS — I48.0 PAROXYSMAL ATRIAL FIBRILLATION (H): ICD-10-CM

## 2020-02-03 LAB — INR POINT OF CARE: 2 (ref 0.86–1.14)

## 2020-02-03 PROCEDURE — 85610 PROTHROMBIN TIME: CPT | Mod: QW

## 2020-02-03 PROCEDURE — 36416 COLLJ CAPILLARY BLOOD SPEC: CPT

## 2020-02-03 PROCEDURE — 99207 ZZC NO CHARGE NURSE ONLY: CPT

## 2020-02-03 NOTE — PROGRESS NOTES
ANTICOAGULATION FOLLOW-UP CLINIC VISIT    Patient Name:  Haresh Rock  Date:  2/3/2020  Contact Type:  Face to Face    SUBJECTIVE:  Patient Findings     Comments:   The patient was assessed for diet, medication, and activity level changes, missed or extra doses, bruising or bleeding, with no problem findings.          Clinical Outcomes     Negatives:   Major bleeding event, Thromboembolic event, Anticoagulation-related hospital admission, Anticoagulation-related ED visit, Anticoagulation-related fatality    Comments:   The patient was assessed for diet, medication, and activity level changes, missed or extra doses, bruising or bleeding, with no problem findings.             OBJECTIVE    INR Protime   Date Value Ref Range Status   2020 2.0 (A) 0.86 - 1.14 Final     Factor 2 Assay   Date Value Ref Range Status   2007 58 (L) 60 - 140 % Final     Comment:     (Note)  CALLED TO TAMARA(INTERV. RADIOLOGY)[id number],        ASSESSMENT / PLAN  No question data found.  Anticoagulation Summary  As of 2/3/2020    INR goal:   2.0-3.0   TTR:   92.7 % (1 y)   INR used for dosin.0 (2/3/2020)   Warfarin maintenance plan:   1.25 mg (2.5 mg x 0.5) every Mon, Fri; 2.5 mg (2.5 mg x 1) all other days   Full warfarin instructions:   1.25 mg every Mon, Fri; 2.5 mg all other days   Weekly warfarin total:   15 mg   No change documented:   Laverne Ferguson RN   Plan last modified:   Laverne Ferguson RN (2018)   Next INR check:   3/16/2020   Priority:   INR   Target end date:   Indefinite    Indications    Long-term (current) use of anticoagulants [Z79.01] [Z79.01]  Atrial fibrillation (H) [I48.91]             Anticoagulation Episode Summary     INR check location:       Preferred lab:       Send INR reminders to:   CHELSEA CAMACHO    Comments:         Anticoagulation Care Providers     Provider Role Specialty Phone number    Anya Nowak MD [facility] [phone]            See the  Encounter Report to view Anticoagulation Flowsheet and Dosing Calendar (Go to Encounters tab in chart review, and find the Anticoagulation Therapy Visit)        Laverne Ferguson RN

## 2020-02-03 NOTE — PATIENT INSTRUCTIONS
Anticoagulation Clinic:  Please call [phone] with any problems or questions regarding your Warfarin therapy.

## 2020-02-06 DIAGNOSIS — D89.811 GRAFT-VERSUS-HOST DISEASE, CHRONIC (H): ICD-10-CM

## 2020-02-06 DIAGNOSIS — C81.99 HODGKIN'S DISEASE OF EXTRANODAL OR SOLID ORGAN SITE (H): ICD-10-CM

## 2020-02-06 RX ORDER — PANTOPRAZOLE SODIUM 20 MG/1
20 TABLET, DELAYED RELEASE ORAL AT BEDTIME
Qty: 90 TABLET | Refills: 3 | Status: SHIPPED | OUTPATIENT
Start: 2020-02-06 | End: 2021-01-19

## 2020-02-17 DIAGNOSIS — C81.99 HODGKIN'S DISEASE OF EXTRANODAL OR SOLID ORGAN SITE (H): ICD-10-CM

## 2020-02-17 DIAGNOSIS — Z79.01 LONG TERM CURRENT USE OF ANTICOAGULANT THERAPY: ICD-10-CM

## 2020-02-17 DIAGNOSIS — D89.811 GRAFT-VERSUS-HOST DISEASE, CHRONIC (H): Primary | ICD-10-CM

## 2020-02-18 ENCOUNTER — ONCOLOGY VISIT (OUTPATIENT)
Dept: TRANSPLANT | Facility: CLINIC | Age: 70
End: 2020-02-18
Attending: INTERNAL MEDICINE
Payer: COMMERCIAL

## 2020-02-18 ENCOUNTER — APPOINTMENT (OUTPATIENT)
Dept: LAB | Facility: CLINIC | Age: 70
End: 2020-02-18
Attending: INTERNAL MEDICINE
Payer: COMMERCIAL

## 2020-02-18 VITALS
DIASTOLIC BLOOD PRESSURE: 78 MMHG | OXYGEN SATURATION: 99 % | HEIGHT: 72 IN | SYSTOLIC BLOOD PRESSURE: 136 MMHG | RESPIRATION RATE: 18 BRPM | HEART RATE: 68 BPM | BODY MASS INDEX: 27.81 KG/M2 | WEIGHT: 205.3 LBS | TEMPERATURE: 96.8 F

## 2020-02-18 DIAGNOSIS — C81.99 HODGKIN'S DISEASE OF EXTRANODAL OR SOLID ORGAN SITE (H): ICD-10-CM

## 2020-02-18 DIAGNOSIS — Z79.01 LONG TERM CURRENT USE OF ANTICOAGULANT THERAPY: ICD-10-CM

## 2020-02-18 DIAGNOSIS — D47.1 MYELOPROLIFERATIVE DISORDER (H): Primary | ICD-10-CM

## 2020-02-18 DIAGNOSIS — D89.811 GRAFT-VERSUS-HOST DISEASE, CHRONIC (H): ICD-10-CM

## 2020-02-18 LAB
ALBUMIN SERPL-MCNC: 3.7 G/DL (ref 3.4–5)
ALP SERPL-CCNC: 115 U/L (ref 40–150)
ALT SERPL W P-5'-P-CCNC: 24 U/L (ref 0–70)
ANION GAP SERPL CALCULATED.3IONS-SCNC: 3 MMOL/L (ref 3–14)
AST SERPL W P-5'-P-CCNC: 31 U/L (ref 0–45)
BASOPHILS # BLD AUTO: 0 10E9/L (ref 0–0.2)
BASOPHILS NFR BLD AUTO: 0.5 %
BILIRUB SERPL-MCNC: 0.9 MG/DL (ref 0.2–1.3)
BUN SERPL-MCNC: 21 MG/DL (ref 7–30)
CALCIUM SERPL-MCNC: 8.5 MG/DL (ref 8.5–10.1)
CHLORIDE SERPL-SCNC: 106 MMOL/L (ref 94–109)
CO2 SERPL-SCNC: 29 MMOL/L (ref 20–32)
CREAT SERPL-MCNC: 1.12 MG/DL (ref 0.66–1.25)
CRP SERPL-MCNC: 4 MG/L (ref 0–8)
DIFFERENTIAL METHOD BLD: ABNORMAL
EOSINOPHIL # BLD AUTO: 0.1 10E9/L (ref 0–0.7)
EOSINOPHIL NFR BLD AUTO: 1.4 %
ERYTHROCYTE [DISTWIDTH] IN BLOOD BY AUTOMATED COUNT: 12.3 % (ref 10–15)
ERYTHROCYTE [SEDIMENTATION RATE] IN BLOOD BY WESTERGREN METHOD: 25 MM/H (ref 0–20)
FERRITIN SERPL-MCNC: 88 NG/ML (ref 26–388)
GFR SERPL CREATININE-BSD FRML MDRD: 65 ML/MIN/{1.73_M2}
GLUCOSE SERPL-MCNC: 161 MG/DL (ref 70–99)
HCT VFR BLD AUTO: 50 % (ref 40–53)
HGB BLD-MCNC: 17.9 G/DL (ref 13.3–17.7)
IMM GRANULOCYTES # BLD: 0 10E9/L (ref 0–0.4)
IMM GRANULOCYTES NFR BLD: 0.3 %
INR PPP: 2.12 (ref 0.86–1.14)
IRON SATN MFR SERPL: 68 % (ref 15–46)
IRON SERPL-MCNC: 172 UG/DL (ref 35–180)
LDH SERPL L TO P-CCNC: 208 U/L (ref 85–227)
LYMPHOCYTES # BLD AUTO: 2.6 10E9/L (ref 0.8–5.3)
LYMPHOCYTES NFR BLD AUTO: 30.1 %
MCH RBC QN AUTO: 33 PG (ref 26.5–33)
MCHC RBC AUTO-ENTMCNC: 35.8 G/DL (ref 31.5–36.5)
MCV RBC AUTO: 92 FL (ref 78–100)
MONOCYTES # BLD AUTO: 0.8 10E9/L (ref 0–1.3)
MONOCYTES NFR BLD AUTO: 9.5 %
NEUTROPHILS # BLD AUTO: 5 10E9/L (ref 1.6–8.3)
NEUTROPHILS NFR BLD AUTO: 58.2 %
NRBC # BLD AUTO: 0 10*3/UL
NRBC BLD AUTO-RTO: 0 /100
PLATELET # BLD AUTO: 119 10E9/L (ref 150–450)
POTASSIUM SERPL-SCNC: 4.3 MMOL/L (ref 3.4–5.3)
PROT SERPL-MCNC: 7.4 G/DL (ref 6.8–8.8)
RBC # BLD AUTO: 5.43 10E12/L (ref 4.4–5.9)
SODIUM SERPL-SCNC: 138 MMOL/L (ref 133–144)
TIBC SERPL-MCNC: 254 UG/DL (ref 240–430)
WBC # BLD AUTO: 8.6 10E9/L (ref 4–11)

## 2020-02-18 PROCEDURE — 83540 ASSAY OF IRON: CPT | Performed by: INTERNAL MEDICINE

## 2020-02-18 PROCEDURE — 86140 C-REACTIVE PROTEIN: CPT | Performed by: INTERNAL MEDICINE

## 2020-02-18 PROCEDURE — 85610 PROTHROMBIN TIME: CPT | Performed by: INTERNAL MEDICINE

## 2020-02-18 PROCEDURE — 80053 COMPREHEN METABOLIC PANEL: CPT | Performed by: INTERNAL MEDICINE

## 2020-02-18 PROCEDURE — 00000402 ZZHCL STATISTIC TOTAL PROTEIN: Performed by: INTERNAL MEDICINE

## 2020-02-18 PROCEDURE — G0463 HOSPITAL OUTPT CLINIC VISIT: HCPCS | Mod: ZF

## 2020-02-18 PROCEDURE — 82728 ASSAY OF FERRITIN: CPT | Performed by: INTERNAL MEDICINE

## 2020-02-18 PROCEDURE — 36415 COLL VENOUS BLD VENIPUNCTURE: CPT

## 2020-02-18 PROCEDURE — 81403 MOPATH PROCEDURE LEVEL 4: CPT | Performed by: INTERNAL MEDICINE

## 2020-02-18 PROCEDURE — 82784 ASSAY IGA/IGD/IGG/IGM EACH: CPT | Performed by: INTERNAL MEDICINE

## 2020-02-18 PROCEDURE — 83615 LACTATE (LD) (LDH) ENZYME: CPT | Performed by: INTERNAL MEDICINE

## 2020-02-18 PROCEDURE — 81270 JAK2 GENE: CPT | Performed by: INTERNAL MEDICINE

## 2020-02-18 PROCEDURE — 85025 COMPLETE CBC W/AUTO DIFF WBC: CPT | Performed by: INTERNAL MEDICINE

## 2020-02-18 PROCEDURE — 85652 RBC SED RATE AUTOMATED: CPT | Performed by: INTERNAL MEDICINE

## 2020-02-18 PROCEDURE — 83550 IRON BINDING TEST: CPT | Performed by: INTERNAL MEDICINE

## 2020-02-18 PROCEDURE — 84165 PROTEIN E-PHORESIS SERUM: CPT | Performed by: INTERNAL MEDICINE

## 2020-02-18 ASSESSMENT — PAIN SCALES - GENERAL: PAINLEVEL: NO PAIN (0)

## 2020-02-18 ASSESSMENT — MIFFLIN-ST. JEOR: SCORE: 1716.29

## 2020-02-18 NOTE — NURSING NOTE
Chief Complaint   Patient presents with     Labs Only     Labs drawn by venipuncture. Pt checked in for provider visit.      Labs drawn. Pt checked in for provider visit.    Nayana Wright RN.

## 2020-02-18 NOTE — PROGRESS NOTES
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 No cardiac issue no arrhthymias since on felcanide no chest pain.He had a phlebotomy in May 2019 for elevated ferritin Still on warfarin 2.5mg and 1.25 mg on Monday and Fridays No bleeding. Feels good  No fever or chills    PAST MEDICAL HISTORY:  See my note from 08/2019    SOCIAL HISTORY:  See my note from 08/2019     FAMILY HISTORY:  See my note from  08/2019     REVIEW OF SYSTEMS:  A 12-point review of systems is done and is negative, except as in the HPI.      PHYSICAL EXAMINATION:   GENERAL:  He is an alert gentleman in no acute distress.   VITAL SIGNS:  Stable. /78 (BP Location: Right arm)   Pulse 68   Temp 96.8  F (36  C) (Oral)   Resp 18   Ht 1.816 m (5' 11.5\")   Wt 93.1 kg (205 lb 4.8 oz)   SpO2 99%   BMI 28.23 kg/m      HEENT:  Normocephalic. EOM okay, no scleral icterus. Mouth without lesion.  RESPIRATORY:  Clear to percussion and auscultation.   LYMPH: No cervical, supraclavicular, submandibular, axillary or inguinal nodes.   CARDIAC: Normal sinus rhythm.  No S3, S4. 1-2/6 SAYDA best heard along the right sternal border with no radiation.  ABDOMEN:  Soft without hepatosplenomegaly.  SKIN:  Extremities appear warm and well-perfused without edema.  Extensive brown-black plaques noted on the back, concentrated more on the upper back.  EXT Abrasion left knee    Results for HARESH GRANADOS (MRN [id number]) as of 2/18/2020 12:56   Ref. Range 2/18/2020 11:49   Sodium Latest Ref Range: 133 - 144 mmol/L 138   Potassium Latest Ref Range: 3.4 - 5.3 mmol/L " 4.3   Chloride Latest Ref Range: 94 - 109 mmol/L 106   Carbon Dioxide Latest Ref Range: 20 - 32 mmol/L 29   Urea Nitrogen Latest Ref Range: 7 - 30 mg/dL 21   Creatinine Latest Ref Range: 0.66 - 1.25 mg/dL 1.12   GFR Estimate Latest Ref Range: >60 mL/min/1.73_m2 65   GFR Estimate If Black Latest Ref Range: >60 mL/min/1.73_m2 76   Calcium Latest Ref Range: 8.5 - 10.1 mg/dL 8.5   Anion Gap Latest Ref Range: 3 - 14 mmol/L 3   Albumin Latest Ref Range: 3.4 - 5.0 g/dL 3.7   Protein Total Latest Ref Range: 6.8 - 8.8 g/dL 7.4   Bilirubin Total Latest Ref Range: 0.2 - 1.3 mg/dL 0.9   Alkaline Phosphatase Latest Ref Range: 40 - 150 U/L 115   ALT Latest Ref Range: 0 - 70 U/L 24   AST Latest Ref Range: 0 - 45 U/L 31   CRP Inflammation Latest Ref Range: 0.0 - 8.0 mg/L 4.0   Ferritin Latest Ref Range: 26 - 388 ng/mL 88   Iron Latest Ref Range: 35 - 180 ug/dL 172   Iron Binding Cap Latest Ref Range: 240 - 430 ug/dL 254   Iron Saturation Index Latest Ref Range: 15 - 46 % 68 (H)   Lactate Dehydrogenase Latest Ref Range: 85 - 227 U/L 208   Glucose Latest Ref Range: 70 - 99 mg/dL 161 (H)   WBC Latest Ref Range: 4.0 - 11.0 10e9/L 8.6   Hemoglobin Latest Ref Range: 13.3 - 17.7 g/dL 17.9 (H)   Hematocrit Latest Ref Range: 40.0 - 53.0 % 50.0   Platelet Count Latest Ref Range: 150 - 450 10e9/L 119 (L)   RBC Count Latest Ref Range: 4.4 - 5.9 10e12/L 5.43   MCV Latest Ref Range: 78 - 100 fl 92   MCH Latest Ref Range: 26.5 - 33.0 pg 33.0   MCHC Latest Ref Range: 31.5 - 36.5 g/dL 35.8   RDW Latest Ref Range: 10.0 - 15.0 % 12.3   Diff Method Unknown Automated Method   % Neutrophils Latest Units: % 58.2   % Lymphocytes Latest Units: % 30.1   % Monocytes Latest Units: % 9.5   % Eosinophils Latest Units: % 1.4   % Basophils Latest Units: % 0.5   % Immature Granulocytes Latest Units: % 0.3   Nucleated RBCs Latest Ref Range: 0 /100 0   Absolute Neutrophil Latest Ref Range: 1.6 - 8.3 10e9/L 5.0   Absolute Lymphocytes Latest Ref Range: 0.8 - 5.3  10e9/L 2.6   Absolute Monocytes Latest Ref Range: 0.0 - 1.3 10e9/L 0.8   Absolute Eosinophils Latest Ref Range: 0.0 - 0.7 10e9/L 0.1   Absolute Basophils Latest Ref Range: 0.0 - 0.2 10e9/L 0.0   Abs Immature Granulocytes Latest Ref Range: 0 - 0.4 10e9/L 0.0   Absolute Nucleated RBC Unknown 0.0   Sed Rate Latest Ref Range: 0 - 20 mm/h 25 (H)   INR Latest Ref Range: 0.86 - 1.14  2.12 (H)     ASSESSMENT:     1.  Myeloproliferative syndrome/MDS. JAL 2 positive  2.  Status post non-myeloablative allo-sib peripheral blood stem cell transplant.   3.  History of chronic dmifr-xngcbi-ousc disease.   4.  History of Hodgkin's disease.   5.  History of cardiac arrhythmias, SVT and V tach.   6.  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   7.  History of cirrhosis.   8.  History of pulmonary emboli.   9.  History of elevated hemoglobin A1c.   10.  Thyroid nodule.    11.  Cholelithiasis.     12.  Diverticulosis.   13.  Anticoagulation.   14.  Status post bilateral knee replacement.     15.  Aortic stenosis  16. 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  17. Seborrheic keratosis of the back.     Haresh is doing well  I am not sure why his hemoglobin is 17.9 It actually has been elevated for a couple years. I doubt that his STEVIE 2 has recurred. I t was absent when last tested in Feb 2008 Will check today. Does he have high epo related to liver disease?? Or does he have a lower plasma volume The Hodgkins remains in CR .Ferrtin is 88 so will not phlebotomize now. I will see him in June and recheck iron levels    Augusto Miller MD   [phone]

## 2020-02-18 NOTE — NURSING NOTE
"Oncology Rooming Note    February 18, 2020 12:29 PM   Haresh Rock is a 69 year old male who presents for:    Chief Complaint   Patient presents with     Labs Only     Labs drawn by venipuncture. Pt checked in for provider visit.      Oncology Clinic Visit     Return: Ghyrb-tmlefu-ldex disease, chronic     Initial Vitals: /78 (BP Location: Right arm)   Pulse 68   Temp 96.8  F (36  C) (Oral)   Resp 18   Ht 1.816 m (5' 11.5\")   Wt 93.1 kg (205 lb 4.8 oz)   SpO2 99%   BMI 28.23 kg/m   Estimated body mass index is 28.23 kg/m  as calculated from the following:    Height as of this encounter: 1.816 m (5' 11.5\").    Weight as of this encounter: 93.1 kg (205 lb 4.8 oz). Body surface area is 2.17 meters squared.  No Pain (0) Comment: Data Unavailable   No LMP for male patient.  Allergies reviewed: Yes  Medications reviewed: Yes    Medications: Medication refills not needed today.  Pharmacy name entered into EPIC:    [id number] - [place], [place] - [address] [facility] - [place], [place] - [address] AT [address] TO [facility] S[address]    Clinical concerns: Pt will discuss concerns with provider.  Dr. Miller was notified.      Marci Cisneros CMA              "

## 2020-02-19 LAB
ALBUMIN SERPL ELPH-MCNC: 4 G/DL (ref 3.7–5.1)
ALPHA1 GLOB SERPL ELPH-MCNC: 0.3 G/DL (ref 0.2–0.4)
ALPHA2 GLOB SERPL ELPH-MCNC: 0.8 G/DL (ref 0.5–0.9)
B-GLOBULIN SERPL ELPH-MCNC: 0.8 G/DL (ref 0.6–1)
GAMMA GLOB SERPL ELPH-MCNC: 1.3 G/DL (ref 0.7–1.6)
IGG SERPL-MCNC: 1091 MG/DL (ref 610–1616)
M PROTEIN SERPL ELPH-MCNC: 0 G/DL
PROT PATTERN SERPL ELPH-IMP: NORMAL

## 2020-02-20 LAB — COPATH REPORT: NORMAL

## 2020-02-25 LAB — COPATH REPORT: NORMAL

## 2020-03-30 ENCOUNTER — MEDICAL CORRESPONDENCE (OUTPATIENT)
Dept: HEALTH INFORMATION MANAGEMENT | Facility: CLINIC | Age: 70
End: 2020-03-30

## 2020-03-30 ENCOUNTER — TELEPHONE (OUTPATIENT)
Dept: FAMILY MEDICINE | Facility: CLINIC | Age: 70
End: 2020-03-30

## 2020-03-30 NOTE — TELEPHONE ENCOUNTER
The Form has been completed by the provider, confirmed faxed to the fax number on the form and listed below and a copy has been sent to be added to the chart. Lizzeth Hendrix,

## 2020-03-30 NOTE — TELEPHONE ENCOUNTER
Patient is interested in doing INR's from home. Paperwork completed for mdINR and placed in provider's box along with insurance information. Needs signature and faxed.    Laverne Ferguson RN - [facility]

## 2020-04-01 ENCOUNTER — MYC REFILL (OUTPATIENT)
Dept: FAMILY MEDICINE | Facility: CLINIC | Age: 70
End: 2020-04-01

## 2020-04-01 DIAGNOSIS — D68.61 ANTIPHOSPHOLIPID ANTIBODY SYNDROME (H): ICD-10-CM

## 2020-04-01 DIAGNOSIS — I48.91 ATRIAL FIBRILLATION, UNSPECIFIED TYPE (H): ICD-10-CM

## 2020-04-02 DIAGNOSIS — D89.811 GRAFT-VERSUS-HOST DISEASE, CHRONIC (H): ICD-10-CM

## 2020-04-02 DIAGNOSIS — C85.90 LYMPHOMA, UNSPECIFIED BODY REGION, UNSPECIFIED LYMPHOMA TYPE (H): ICD-10-CM

## 2020-04-02 RX ORDER — FOLIC ACID 1 MG/1
1000 TABLET ORAL DAILY
Qty: 90 TABLET | Refills: 3 | Status: SHIPPED | OUTPATIENT
Start: 2020-04-02 | End: 2020-11-17

## 2020-04-02 RX ORDER — WARFARIN SODIUM 2.5 MG/1
TABLET ORAL
Qty: 72 TABLET | Refills: 0 | Status: SHIPPED | OUTPATIENT
Start: 2020-04-02 | End: 2020-07-01

## 2020-04-13 ENCOUNTER — TELEPHONE (OUTPATIENT)
Dept: FAMILY MEDICINE | Facility: CLINIC | Age: 70
End: 2020-04-13

## 2020-04-13 NOTE — LETTER
April 13, 2020      Haresh Rock  [address] [place] [address]      Dear Haresh,    You are currently under the care of [facility] for your warfarin (Coumadin ) therapy.  We are contacting you because our records show you were due for an INR on 03/16/20.    There are potentially serious risks when taking warfarin without careful monitoring and we want to make sure you are safely managed.  Routine INR monitoring is required for warfarin refills.     Please call [phone] as soon as possible to schedule an appointment.  If there has been a change in your care or other concerns, please let us know so we can help and or update our records.     Sincerely,       [facility]

## 2020-04-13 NOTE — TELEPHONE ENCOUNTER
Haresh Rock is overdue for INR check.      First reminder letter sent     Laverne Ferguson RN - [facility]

## 2020-04-21 DIAGNOSIS — I48.0 PAROXYSMAL ATRIAL FIBRILLATION (H): ICD-10-CM

## 2020-04-21 DIAGNOSIS — I47.10 PAROXYSMAL SUPRAVENTRICULAR TACHYCARDIA (H): ICD-10-CM

## 2020-04-21 RX ORDER — METOPROLOL TARTRATE 25 MG/1
TABLET, FILM COATED ORAL
Qty: 180 TABLET | Refills: 0 | Status: SHIPPED | OUTPATIENT
Start: 2020-04-21 | End: 2020-07-13

## 2020-04-24 ENCOUNTER — TRANSFERRED RECORDS (OUTPATIENT)
Dept: HEALTH INFORMATION MANAGEMENT | Facility: CLINIC | Age: 70
End: 2020-04-24

## 2020-04-24 ENCOUNTER — TELEPHONE (OUTPATIENT)
Dept: FAMILY MEDICINE | Facility: CLINIC | Age: 70
End: 2020-04-24

## 2020-04-24 DIAGNOSIS — I48.0 PAROXYSMAL ATRIAL FIBRILLATION (H): ICD-10-CM

## 2020-04-24 DIAGNOSIS — Z79.01 LONG TERM CURRENT USE OF ANTICOAGULANT THERAPY: ICD-10-CM

## 2020-04-24 LAB — INR PPP: 2.7 (ref 0.9–1.1)

## 2020-04-24 NOTE — TELEPHONE ENCOUNTER
ANTICOAGULATION MANAGEMENT     Patient Name:  Haresh Rock  Date:  4/24/2020    ASSESSMENT /SUBJECTIVE:    Today's INR result of 2.7 is therapeutic. Goal INR of 2.0-3.0      Warfarin dose taken: Warfarin taken as previously instructed    Diet: No new diet changes affecting INR    Medication changes/ interactions: No new medications/supplements affecting INR    Previous INR: Therapeutic     S/S of bleeding or thromboembolism: No    New injury or illness: No    Upcoming surgery, procedure or cardioversion: No    Additional findings: This is patient's first INR with his new home meter      PLAN:    Spoke with Haresh regarding INR result and instructed:     Warfarin Dosing Instructions: Continue your current warfarin dose      1.25 mg every Mon, Fri; 2.5 mg all other days         Instructed patient to follow up no later than: 1 week per his contract with [facility]  Patient to recheck with home meter    Education provided: Importance of consistent vitamin K intake, Target INR goal and significance of current INR result, Importance of notifying clinic for changes in medications, Importance of notifying clinic for diarrhea, nausea/vomiting, reduced intake and/or illness, Information on home INR monitoring and Importance of notifying clinic of upcoming surgeries and procedures 2 weeks in advance     We did discuss call by exception with home INR monitors and Haresh verbalized understanding and was in agreement      Haresh verbalizes understanding and agrees to warfarin dosing plan.    Instructed to call the Anticoagulation Clinic for any changes, questions or concerns. (#[phone])        OBJECTIVE:  INR   Date Value Ref Range Status   04/24/2020 2.7 (A) 0.90 - 1.10 Final     Factor 2 Assay   Date Value Ref Range Status   05/01/2007 58 (L) 60 - 140 % Final     Comment:     (Note)  CALLED TO TAMARA(INTERV. RADIOLOGY)[id number],              Anticoagulation Summary  As of 4/24/2020    INR goal:   2.0-3.0   TTR:   92.7 % (1 y)   INR  used for dosin.7 (2020)   Warfarin maintenance plan:   1.25 mg (2.5 mg x 0.5) every Mon, Fri; 2.5 mg (2.5 mg x 1) all other days   Full warfarin instructions:   1.25 mg every Mon, Fri; 2.5 mg all other days   Weekly warfarin total:   15 mg   Plan last modified:   Laverne Ferguson RN (2018)   Next INR check:   2020   Priority:   Maintenance   Target end date:   Indefinite    Indications    Long-term (current) use of anticoagulants [Z79.01] [Z79.01]  Atrial fibrillation (H) [I48.91]             Anticoagulation Episode Summary     INR check location:       Preferred lab:       Send INR reminders to:   CHELSEA CAMACHO    Comments:         Anticoagulation Care Providers     Provider Role Specialty Phone number    Anya Nowak MD [facility] [phone]

## 2020-04-29 ENCOUNTER — TRANSFERRED RECORDS (OUTPATIENT)
Dept: HEALTH INFORMATION MANAGEMENT | Facility: CLINIC | Age: 70
End: 2020-04-29

## 2020-04-29 ENCOUNTER — TELEPHONE (OUTPATIENT)
Dept: FAMILY MEDICINE | Facility: CLINIC | Age: 70
End: 2020-04-29

## 2020-04-29 DIAGNOSIS — Z79.01 LONG TERM CURRENT USE OF ANTICOAGULANT THERAPY: ICD-10-CM

## 2020-04-29 DIAGNOSIS — I48.0 PAROXYSMAL ATRIAL FIBRILLATION (H): ICD-10-CM

## 2020-04-29 LAB — INR PPP: 2.9 (ref 0.9–1.1)

## 2020-04-29 NOTE — TELEPHONE ENCOUNTER
ANTICOAGULATION MANAGEMENT     Patient Name:  Haresh Rock  Date:  2020    ASSESSMENT /SUBJECTIVE:    Today's INR result of 2.9 is therapeutic. Goal INR of 2.0-3.0      Patient okay to manage by  Exception, left detailed message for patient due to new to home monitor.      PLAN:    Left detailed message for Haresh regarding INR result and instructed:     Warfarin Dosing Instructions: Continue your current warfarin dose 1.25 g on  and 2.5 mg all other days    Instructed patient to follow up no later than: 1 week  Patient to recheck with home meter    Education provided: None required          Instructed to call the Anticoagulation Clinic for any changes, questions or concerns. (#[phone])        OBJECTIVE:  INR   Date Value Ref Range Status   2020 2.9 (A) 0.90 - 1.10 Final     Factor 2 Assay   Date Value Ref Range Status   2007 58 (L) 60 - 140 % Final     Comment:     (Note)  CALLED TO TAMARA(INTERV. RADIOLOGY)[id number],              Anticoagulation Summary  As of 2020    INR goal:   2.0-3.0   TTR:   92.7 % (1 y)   INR used for dosin.9 (2020)   Warfarin maintenance plan:   1.25 mg (2.5 mg x 0.5) every Mon, Fri; 2.5 mg (2.5 mg x 1) all other days   Full warfarin instructions:   1.25 mg every Mon, Fri; 2.5 mg all other days   Weekly warfarin total:   15 mg   Plan last modified:   Laverne Ferguson RN (2018)   Next INR check:   2020   Priority:   Maintenance   Target end date:   Indefinite    Indications    Long-term (current) use of anticoagulants [Z79.01] [Z79.01]  Atrial fibrillation (H) [I48.91]             Anticoagulation Episode Summary     INR check location:       Preferred lab:       Send INR reminders to:   CHELSEA CAMACHO    Comments:   okay to manage by exception      Anticoagulation Care Providers     Provider Role Specialty Phone number    Anya Nowak MD [facility] [phone]

## 2020-05-06 ENCOUNTER — TRANSFERRED RECORDS (OUTPATIENT)
Dept: HEALTH INFORMATION MANAGEMENT | Facility: CLINIC | Age: 70
End: 2020-05-06

## 2020-05-06 ENCOUNTER — TELEPHONE (OUTPATIENT)
Dept: FAMILY MEDICINE | Facility: CLINIC | Age: 70
End: 2020-05-06

## 2020-05-06 DIAGNOSIS — Z79.01 LONG TERM CURRENT USE OF ANTICOAGULANT THERAPY: ICD-10-CM

## 2020-05-06 DIAGNOSIS — I48.0 PAROXYSMAL ATRIAL FIBRILLATION (H): ICD-10-CM

## 2020-05-06 LAB — INR PPP: 2.3 (ref 0.9–1.1)

## 2020-05-06 NOTE — TELEPHONE ENCOUNTER
ANTICOAGULATION MANAGEMENT     Patient Name:  Haersh Rock  Date:  2020    ASSESSMENT /SUBJECTIVE:    Today's INR result of 2.3 is therapeutic. Goal INR of 2.0-3.0      Warfarin dose taken: Warfarin taken as previously instructed    Diet: No new diet changes affecting INR    Medication changes/ interactions: No new medications/supplements affecting INR    Previous INR: Therapeutic     S/S of bleeding or thromboembolism: No    New injury or illness: No    Upcoming surgery, procedure or cardioversion: No    Additional findings: None      PLAN:    Spoke with Haresh regarding INR result and instructed:     Warfarin Dosing Instructions: Continue your current warfarin dose of 1.25mg Mon/Fri; 2.5mg all other days    Instructed patient to follow up no later than: 1 week  Patient to recheck with home meter    Education provided: Target INR goal and significance of current INR result      Haresh verbalizes understanding and agrees to warfarin dosing plan.    Instructed to call the Anticoagulation Clinic for any changes, questions or concerns. (#[phone])        OBJECTIVE:  INR   Date Value Ref Range Status   2020 2.3 (A) 0.90 - 1.10 Final     Factor 2 Assay   Date Value Ref Range Status   2007 58 (L) 60 - 140 % Final     Comment:     (Note)  CALLED TO TAMARA(INTERV. RADIOLOGY)[id number],              Anticoagulation Summary  As of 2020    INR goal:   2.0-3.0   TTR:   92.7 % (1 y)   INR used for dosin.3 (2020)   Warfarin maintenance plan:   1.25 mg (2.5 mg x 0.5) every Mon, Fri; 2.5 mg (2.5 mg x 1) all other days   Full warfarin instructions:   1.25 mg every Mon, Fri; 2.5 mg all other days   Weekly warfarin total:   15 mg   Plan last modified:   Laverne Ferguson RN (2018)   Next INR check:   2020   Priority:   Maintenance   Target end date:   Indefinite    Indications    Long-term (current) use of anticoagulants [Z79.01] [Z79.01]  Atrial fibrillation (H) [I48.91]              Anticoagulation Episode Summary     INR check location:       Preferred lab:       Send INR reminders to:   CHELSEA CAMACHO    Comments:   okay to manage by exception      Anticoagulation Care Providers     Provider Role Specialty Phone number    Anya Nowak MD [facility] [phone]

## 2020-05-08 DIAGNOSIS — E78.5 HYPERLIPIDEMIA WITH TARGET LDL LESS THAN 100: ICD-10-CM

## 2020-05-08 RX ORDER — SIMVASTATIN 10 MG
TABLET ORAL
Qty: 90 TABLET | Refills: 0 | Status: SHIPPED | OUTPATIENT
Start: 2020-05-08 | End: 2020-07-06

## 2020-05-08 NOTE — TELEPHONE ENCOUNTER
Medication is being filled for 1 time refill only due to:  Patient needs to be seen because it has been more than one year since last visit.   Please call to schedule-not reading my chart.  Tatiana Monroy RN

## 2020-05-11 NOTE — TELEPHONE ENCOUNTER
Called and left patient VM to return call to schedule for an appt. In July med check   Sruthi Munguia CMA on 5/11/2020 at 8:44 AM

## 2020-05-13 ENCOUNTER — TRANSFERRED RECORDS (OUTPATIENT)
Dept: HEALTH INFORMATION MANAGEMENT | Facility: CLINIC | Age: 70
End: 2020-05-13

## 2020-05-13 ENCOUNTER — TELEPHONE (OUTPATIENT)
Dept: FAMILY MEDICINE | Facility: CLINIC | Age: 70
End: 2020-05-13

## 2020-05-13 DIAGNOSIS — Z79.01 LONG TERM CURRENT USE OF ANTICOAGULANT THERAPY: ICD-10-CM

## 2020-05-13 DIAGNOSIS — I48.0 PAROXYSMAL ATRIAL FIBRILLATION (H): ICD-10-CM

## 2020-05-13 LAB — INR PPP: 2.8 (ref 0.9–1.1)

## 2020-05-13 NOTE — TELEPHONE ENCOUNTER
ANTICOAGULATION  MANAGEMENT-Patient Home Monitoring Result    Assessment     Therapeutic INR result of 2.8 . Goal range 2.0-3.0. Received via fax from [employer] home INR monitoring company.        Previous INR was therapeutic    Haresh was last contacted by phone: [phone], per note on 20, patient okay with managed  By exception    Plan     Per home monitoring agreement with patient, patient was NOT contacted regarding therapeutic result today.  Patient is to continue current dose and continue to check INR with home monitor per protocol.  ?       OBJECTIVE    INR   Date Value Ref Range Status   2020 2.8 (A) 0.90 - 1.10 Final     Factor 2 Assay   Date Value Ref Range Status   2007 58 (L) 60 - 140 % Final     Comment:     (Note)  CALLED TO TAMARA(INTERV. RADIOLOGY)[id number],        ASSESSMENT / PLAN      Anticoagulation Summary  As of 2020    INR goal:   2.0-3.0   TTR:   92.7 % (1 y)   INR used for dosin.8 (2020)   Warfarin maintenance plan:   1.25 mg (2.5 mg x 0.5) every Mon, Fri; 2.5 mg (2.5 mg x 1) all other days   Full warfarin instructions:   1.25 mg every Mon, Fri; 2.5 mg all other days   Weekly warfarin total:   15 mg   Plan last modified:   Laverne Ferguson RN (2018)   Next INR check:   2020   Priority:   Maintenance   Target end date:   Indefinite    Indications    Long-term (current) use of anticoagulants [Z79.01] [Z79.01]  Atrial fibrillation (H) [I48.91]             Anticoagulation Episode Summary     INR check location:       Preferred lab:       Send INR reminders to:   CHELSEA CAMACHO    Comments:   okay to manage by exception      Anticoagulation Care Providers     Provider Role Specialty Phone number    Anya Nowak MD [facility] [phone]

## 2020-05-20 ENCOUNTER — TRANSFERRED RECORDS (OUTPATIENT)
Dept: HEALTH INFORMATION MANAGEMENT | Facility: CLINIC | Age: 70
End: 2020-05-20

## 2020-05-21 ENCOUNTER — TELEPHONE (OUTPATIENT)
Dept: FAMILY MEDICINE | Facility: CLINIC | Age: 70
End: 2020-05-21

## 2020-05-21 DIAGNOSIS — I48.0 PAROXYSMAL ATRIAL FIBRILLATION (H): ICD-10-CM

## 2020-05-21 DIAGNOSIS — Z79.01 LONG TERM CURRENT USE OF ANTICOAGULANT THERAPY: ICD-10-CM

## 2020-05-21 LAB — INR PPP: 2.3 (ref 0.9–1.1)

## 2020-05-21 NOTE — TELEPHONE ENCOUNTER
ANTICOAGULATION  MANAGEMENT-Patient Home Monitoring Result    Assessment     Therapeutic INR result of 2.3 . Goal range 2.0-3.0. Received via fax from [employer].        Previous INR was therapeutic    Haresh was last contacted by phone: 20    Plan     Per home monitoring agreement with patient, patient was NOT contacted regarding therapeutic result today.  Patient is to continue current dose and continue to check INR with home monitor per protocol.  ?       OBJECTIVE    INR   Date Value Ref Range Status   2020 2.3 (A) 0.90 - 1.10 Final     Factor 2 Assay   Date Value Ref Range Status   2007 58 (L) 60 - 140 % Final     Comment:     (Note)  CALLED TO [facility](INTERV. RADIOLOGY)[id number],        ASSESSMENT / PLAN      Anticoagulation Summary  As of 2020    INR goal:   2.0-3.0   TTR:   92.7 % (1 y)   INR used for dosin.3 (2020)   Warfarin maintenance plan:   1.25 mg (2.5 mg x 0.5) every Mon, Fri; 2.5 mg (2.5 mg x 1) all other days   Full warfarin instructions:   1.25 mg every Mon, Fri; 2.5 mg all other days   Weekly warfarin total:   15 mg   Plan last modified:   Laverne Ferguson RN (2018)   Next INR check:   2020   Priority:   Maintenance   Target end date:   Indefinite    Indications    Long-term (current) use of anticoagulants [Z79.01] [Z79.01]  Atrial fibrillation (H) [I48.91]             Anticoagulation Episode Summary     INR check location:       Preferred lab:       Send INR reminders to:   CHELSEA CAMACHO    Comments:   okay to manage by exception      Anticoagulation Care Providers     Provider Role Specialty Phone number    Anya Nowak MD [facility] [phone]

## 2020-05-29 ENCOUNTER — TRANSFERRED RECORDS (OUTPATIENT)
Dept: HEALTH INFORMATION MANAGEMENT | Facility: CLINIC | Age: 70
End: 2020-05-29

## 2020-05-29 ENCOUNTER — ANTICOAGULATION THERAPY VISIT (OUTPATIENT)
Dept: FAMILY MEDICINE | Facility: CLINIC | Age: 70
End: 2020-05-29

## 2020-05-29 DIAGNOSIS — I48.0 PAROXYSMAL ATRIAL FIBRILLATION (H): ICD-10-CM

## 2020-05-29 DIAGNOSIS — Z79.01 LONG TERM CURRENT USE OF ANTICOAGULANT THERAPY: ICD-10-CM

## 2020-05-29 LAB — INR PPP: 2.8 (ref 0.9–1.1)

## 2020-05-29 NOTE — PROGRESS NOTES
ANTICOAGULATION  MANAGEMENT-Patient Home Monitoring Result    Assessment     Therapeutic INR result of 2.8 . Goal range 2.0-3.0. Received via fax from [employer].        Previous INR was therapeutic    Haresh was last contacted by phone: 20    Plan     Per home monitoring agreement with patient, patient was NOT contacted regarding therapeutic result today.  Patient is to continue current dose and continue to check INR with home monitor per protocol.  ?       OBJECTIVE    INR   Date Value Ref Range Status   2020 2.8 (A) 0.90 - 1.10 Final     Factor 2 Assay   Date Value Ref Range Status   2007 58 (L) 60 - 140 % Final     Comment:     (Note)  CALLED TO [facility](INTERV. RADIOLOGY)[id number],        ASSESSMENT / PLAN  No question data found.  Anticoagulation Summary  As of 2020    INR goal:   2.0-3.0   TTR:   92.7 % (1 y)   INR used for dosin.8 (2020)   Warfarin maintenance plan:   1.25 mg (2.5 mg x 0.5) every Mon, Fri; 2.5 mg (2.5 mg x 1) all other days   Full warfarin instructions:   1.25 mg every Mon, Fri; 2.5 mg all other days   Weekly warfarin total:   15 mg   Plan last modified:   Laverne Ferguson RN (2018)   Next INR check:   2020   Priority:   Maintenance   Target end date:   Indefinite    Indications    Long-term (current) use of anticoagulants [Z79.01] [Z79.01]  Atrial fibrillation (H) [I48.91]             Anticoagulation Episode Summary     INR check location:       Preferred lab:       Send INR reminders to:   CHELSEA CAMACHO    Comments:   okay to manage by exception      Anticoagulation Care Providers     Provider Role Specialty Phone number    Anya Nowak MD [facility] [phone]

## 2020-06-05 ENCOUNTER — TRANSFERRED RECORDS (OUTPATIENT)
Dept: HEALTH INFORMATION MANAGEMENT | Facility: CLINIC | Age: 70
End: 2020-06-05

## 2020-06-05 ENCOUNTER — ANTICOAGULATION THERAPY VISIT (OUTPATIENT)
Dept: FAMILY MEDICINE | Facility: CLINIC | Age: 70
End: 2020-06-05

## 2020-06-05 DIAGNOSIS — I48.0 PAROXYSMAL ATRIAL FIBRILLATION (H): ICD-10-CM

## 2020-06-05 DIAGNOSIS — Z79.01 LONG TERM CURRENT USE OF ANTICOAGULANT THERAPY: ICD-10-CM

## 2020-06-05 LAB — INR PPP: 2.9 (ref 0.9–1.1)

## 2020-06-05 NOTE — PROGRESS NOTES
ANTICOAGULATION  MANAGEMENT-Patient Home Monitoring Result    Assessment     Therapeutic INR result of 2.9 . Goal range 2.0-3.0. Received via fax from [employer].        Previous INR was therapeutic    Haresh was last contacted by phone:     Plan     Per home monitoring agreement with patient, patient was NOT contacted regarding therapeutic result today.  Patient is to continue current dose and continue to check INR with home monitor per protocol.  ?       OBJECTIVE    INR   Date Value Ref Range Status   2020 2.9 (A) 0.90 - 1.10 Final     Factor 2 Assay   Date Value Ref Range Status   2007 58 (L) 60 - 140 % Final     Comment:     (Note)  CALLED TO [facility](INTERV. RADIOLOGY)[id number],        ASSESSMENT / PLAN  No question data found.  Anticoagulation Summary  As of 2020    INR goal:   2.0-3.0   TTR:   92.7 % (1 y)   INR used for dosin.9 (2020)   Warfarin maintenance plan:   1.25 mg (2.5 mg x 0.5) every Mon, Fri; 2.5 mg (2.5 mg x 1) all other days   Full warfarin instructions:   1.25 mg every Mon, Fri; 2.5 mg all other days   Weekly warfarin total:   15 mg   Plan last modified:   Laverne Ferguson RN (2018)   Next INR check:   2020   Priority:   Maintenance   Target end date:   Indefinite    Indications    Long-term (current) use of anticoagulants [Z79.01] [Z79.01]  Atrial fibrillation (H) [I48.91]             Anticoagulation Episode Summary     INR check location:       Preferred lab:       Send INR reminders to:   CHELSEA CAMACHO    Comments:   okay to manage by exception      Anticoagulation Care Providers     Provider Role Specialty Phone number    Anya Nowak MD [facility] [phone]

## 2020-06-12 ENCOUNTER — ANTICOAGULATION THERAPY VISIT (OUTPATIENT)
Dept: FAMILY MEDICINE | Facility: CLINIC | Age: 70
End: 2020-06-12

## 2020-06-12 DIAGNOSIS — I48.0 PAROXYSMAL ATRIAL FIBRILLATION (H): ICD-10-CM

## 2020-06-12 DIAGNOSIS — Z79.01 LONG TERM CURRENT USE OF ANTICOAGULANT THERAPY: ICD-10-CM

## 2020-06-12 LAB — INR PPP: 2.6 (ref 0.9–1.1)

## 2020-06-12 NOTE — PROGRESS NOTES
ANTICOAGULATION  MANAGEMENT-Patient Home Monitoring Result    Assessment     Therapeutic INR result of 2.6 . Goal range 2.0-3.0. Received via fax from [employer] monitoring [employer].        Previous INR was therapeutic    Haresh was last contacted by phone:     Plan     Per home monitoring agreement with patient, patient was NOT contacted regarding therapeutic result today.  Patient is to continue current dose and continue to check INR with home monitor per protocol.  ?       OBJECTIVE    INR   Date Value Ref Range Status   2020 2.6 (A) 0.90 - 1.10 Final     Factor 2 Assay   Date Value Ref Range Status   2007 58 (L) 60 - 140 % Final     Comment:     (Note)  CALLED TO [facility](INTERV. RADIOLOGY)[id number],        ASSESSMENT / PLAN  No question data found.  Anticoagulation Summary  As of 2020    INR goal:   2.0-3.0   TTR:   92.7 % (1 y)   INR used for dosin.6 (2020)   Warfarin maintenance plan:   1.25 mg (2.5 mg x 0.5) every Mon, Fri; 2.5 mg (2.5 mg x 1) all other days   Full warfarin instructions:   1.25 mg every Mon, Fri; 2.5 mg all other days   Weekly warfarin total:   15 mg   No change documented:   Karin Ramirez RN   Plan last modified:   Laverne Ferguson RN (2018)   Next INR check:   2020   Priority:   Maintenance   Target end date:   Indefinite    Indications    Long-term (current) use of anticoagulants [Z79.01] [Z79.01]  Atrial fibrillation (H) [I48.91]             Anticoagulation Episode Summary     INR check location:       Preferred lab:       Send INR reminders to:   CHELSEA CAMACHO    Comments:   okay to manage by exception      Anticoagulation Care Providers     Provider Role Specialty Phone number    Anya Nowak MD [facility] [phone]           General

## 2020-06-19 ENCOUNTER — TRANSFERRED RECORDS (OUTPATIENT)
Dept: HEALTH INFORMATION MANAGEMENT | Facility: CLINIC | Age: 70
End: 2020-06-19

## 2020-06-19 ENCOUNTER — ANTICOAGULATION THERAPY VISIT (OUTPATIENT)
Dept: FAMILY MEDICINE | Facility: CLINIC | Age: 70
End: 2020-06-19

## 2020-06-19 DIAGNOSIS — Z79.01 LONG TERM CURRENT USE OF ANTICOAGULANT THERAPY: ICD-10-CM

## 2020-06-19 DIAGNOSIS — I48.0 PAROXYSMAL ATRIAL FIBRILLATION (H): ICD-10-CM

## 2020-06-19 LAB — INR PPP: 3.5 (ref 0.9–1.1)

## 2020-06-19 NOTE — PROGRESS NOTES
ANTICOAGULATION MANAGEMENT     Patient Name:  Haresh Rock  Date:  6/19/2020    ASSESSMENT /SUBJECTIVE:    Today's INR result of 3.5 is supratherapeutic. Goal INR of 2.0-3.0      Warfarin dose taken: Warfarin taken as previously instructed    Diet: No new diet changes affecting INR    Medication changes/ interactions: No new medications/supplements affecting INR    Previous INR: Therapeutic     S/S of bleeding or thromboembolism: Yes: once a week noticed bleeding gums when brushing teeth forcefully. This has been going on for 10 years.     New injury or illness: No    Upcoming surgery, procedure or cardioversion: No  Additional findings: mowing lawn weekly     PLAN:    Spoke with Haresh regarding INR result and instructed:     Warfarin Dosing Instructions: hold tomorrows dose (already took todays dose) then continue your current warfarin dose of 1.25mg MF & 2.5mg AOD    Instructed patient to follow up no later than: 1 week  Patient to recheck with home meter    Education provided: None required      Haresh verbalizes understanding and agrees to warfarin dosing plan.    Instructed to call the Anticoagulation Clinic for any changes, questions or concerns. (#[phone])        OBJECTIVE:  INR   Date Value Ref Range Status   06/19/2020 3.5 (A) 0.90 - 1.10 Final     Factor 2 Assay   Date Value Ref Range Status   05/01/2007 58 (L) 60 - 140 % Final     Comment:     (Note)  CALLED TO [facility]ARA(INTERV. RADIOLOGY)[id number],          No question data found.  Anticoagulation Summary  As of 6/19/2020    INR goal:   2.0-3.0   TTR:   91.7 % (1 y)   INR used for dosing:   3.5! (6/19/2020)   Warfarin maintenance plan:   1.25 mg (2.5 mg x 0.5) every Mon, Fri; 2.5 mg (2.5 mg x 1) all other days   Full warfarin instructions:   1.25 mg every Mon, Fri; 2.5 mg all other days   Weekly warfarin total:   15 mg   Plan last modified:   Laverne Ferguson RN (11/14/2018)   Next INR check:      Priority:   Maintenance   Target end date:   Indefinite     Indications    Long-term (current) use of anticoagulants [Z79.01] [Z79.01]  Atrial fibrillation (H) [I48.91]             Anticoagulation Episode Summary     INR check location:       Preferred lab:       Send INR reminders to:   CHELSEA CAMACHO    Comments:   okay to manage by exception      Anticoagulation Care Providers     Provider Role Specialty Phone number    Anya Nowak MD [facility] [phone]         Macrina Avila RN, BSN, PHN

## 2020-06-26 ENCOUNTER — ANTICOAGULATION THERAPY VISIT (OUTPATIENT)
Dept: FAMILY MEDICINE | Facility: CLINIC | Age: 70
End: 2020-06-26

## 2020-06-26 ENCOUNTER — TRANSFERRED RECORDS (OUTPATIENT)
Dept: HEALTH INFORMATION MANAGEMENT | Facility: CLINIC | Age: 70
End: 2020-06-26

## 2020-06-26 DIAGNOSIS — I48.0 PAROXYSMAL ATRIAL FIBRILLATION (H): ICD-10-CM

## 2020-06-26 DIAGNOSIS — Z79.01 LONG TERM CURRENT USE OF ANTICOAGULANT THERAPY: ICD-10-CM

## 2020-06-26 LAB — INR PPP: 1.9 (ref 0.9–1.1)

## 2020-06-26 NOTE — PROGRESS NOTES
ANTICOAGULATION MANAGEMENT     Patient Name:  Haresh Rock  Date:  2020    ASSESSMENT /SUBJECTIVE:    Today's INR result of 1.9 is subtherapeutic. Goal INR of 2.0-3.0      Warfarin dose taken: Warfarin taken as previously instructed    Diet: No new diet changes affecting INR    Medication changes/ interactions: No new medications/supplements affecting INR    Previous INR: Supratherapeutic     S/S of bleeding or thromboembolism: No    New injury or illness: No    Upcoming surgery, procedure or cardioversion: No    Additional findings: None      PLAN:    Spoke with Haresh regarding INR result and instructed:     Warfarin Dosing Instructions: Continue your current warfarin dose 1.25 mg every Mon, Fri; 2.5 mg all other days    Instructed patient to follow up no later than: 1 week  Patient to recheck with home meter    Education provided: Target INR goal and significance of current INR result, Importance of notifying clinic for changes in medications and Importance of notifying clinic for diarrhea, nausea/vomiting, reduced intake and/or illness      Haresh verbalizes understanding and agrees to warfarin dosing plan.    Instructed to call the Anticoagulation Clinic for any changes, questions or concerns. (#[phone])        OBJECTIVE:  INR   Date Value Ref Range Status   2020 1.9 (A) 0.90 - 1.10 Final     Factor 2 Assay   Date Value Ref Range Status   2007 58 (L) 60 - 140 % Final     Comment:     (Note)  CALLED TO [facility]ARA(INTERV. RADIOLOGY)[id number],          No question data found.  Anticoagulation Summary  As of 2020    INR goal:   2.0-3.0   TTR:   90.9 % (1 y)   INR used for dosin.9! (2020)   Warfarin maintenance plan:   1.25 mg (2.5 mg x 0.5) every Mon, Fri; 2.5 mg (2.5 mg x 1) all other days   Full warfarin instructions:   1.25 mg every Mon, Fri; 2.5 mg all other days   Weekly warfarin total:   15 mg   Plan last modified:   Laverne Ferguson RN (2018)   Next INR check:   7/3/2020    Priority:   Maintenance   Target end date:   Indefinite    Indications    Long-term (current) use of anticoagulants [Z79.01] [Z79.01]  Atrial fibrillation (H) [I48.91]             Anticoagulation Episode Summary     INR check location:       Preferred lab:       Send INR reminders to:   CHELSEA CAMACHO    Comments:   okay to manage by exception      Anticoagulation Care Providers     Provider Role Specialty Phone number    Anya Nowak MD [facility] [phone]

## 2020-07-01 DIAGNOSIS — I48.91 ATRIAL FIBRILLATION, UNSPECIFIED TYPE (H): ICD-10-CM

## 2020-07-01 DIAGNOSIS — D68.61 ANTIPHOSPHOLIPID ANTIBODY SYNDROME (H): ICD-10-CM

## 2020-07-01 RX ORDER — WARFARIN SODIUM 2.5 MG/1
TABLET ORAL
Qty: 30 TABLET | Refills: 0 | Status: SHIPPED | OUTPATIENT
Start: 2020-07-01 | End: 2020-07-06

## 2020-07-01 NOTE — TELEPHONE ENCOUNTER
Pt needs appointment. Please call to schedule. Last seen 04/23/2019. 30 day supply sent.    INR 06/26 was 1.9  Dose: 1.25 mg every Mon, Fri; 2.5 mg all other days

## 2020-07-01 NOTE — TELEPHONE ENCOUNTER
Called and spoke with Haresh. He is schedule for a video visit on 7/6/2020 with  Anya Nowak MD. Lizzeth Hendrix,

## 2020-07-03 ENCOUNTER — ANTICOAGULATION THERAPY VISIT (OUTPATIENT)
Dept: FAMILY MEDICINE | Facility: CLINIC | Age: 70
End: 2020-07-03

## 2020-07-03 DIAGNOSIS — I48.0 PAROXYSMAL ATRIAL FIBRILLATION (H): ICD-10-CM

## 2020-07-03 DIAGNOSIS — Z79.01 LONG TERM CURRENT USE OF ANTICOAGULANT THERAPY: ICD-10-CM

## 2020-07-03 LAB — INR PPP: 2.7 (ref 0.9–1.1)

## 2020-07-03 NOTE — PROGRESS NOTES
ANTICOAGULATION MANAGEMENT     Patient Name:  Haresh Rock  Date:  7/3/2020    ASSESSMENT /SUBJECTIVE:    Today's INR result of 2.7 is therapeutic. Goal INR of 2.0-3.0      Warfarin dose taken: Warfarin taken as previously instructed    Diet: No new diet changes affecting INR    Medication changes/ interactions: No new medications/supplements affecting INR    Previous INR: Subtherapeutic     S/S of bleeding or thromboembolism: No    New injury or illness: No    Upcoming surgery, procedure or cardioversion: No    Additional findings: None      PLAN:    Spoke with Haresh regarding INR result and instructed:     Warfarin Dosing Instructions: Continue your current warfarin dose      1.25 mg every Mon, Fri; 2.5 mg all other days         Instructed patient to follow up no later than: 1 week  Patient to recheck with home meter    Education provided: Target INR goal and significance of current INR result      Haresh verbalizes understanding and agrees to warfarin dosing plan.    Instructed to call the Anticoagulation Clinic for any changes, questions or concerns. (#[phone])        OBJECTIVE:  INR   Date Value Ref Range Status   2020 2.7 (A) 0.90 - 1.10 Final     Factor 2 Assay   Date Value Ref Range Status   2007 58 (L) 60 - 140 % Final     Comment:     (Note)  CALLED TO [facility]ARA(INTERV. RADIOLOGY)[id number],          No question data found.  Anticoagulation Summary  As of 7/3/2020    INR goal:   2.0-3.0   TTR:   90.7 % (1 y)   INR used for dosin.7 (7/3/2020)   Warfarin maintenance plan:   1.25 mg (2.5 mg x 0.5) every Mon, Fri; 2.5 mg (2.5 mg x 1) all other days   Full warfarin instructions:   1.25 mg every Mon, Fri; 2.5 mg all other days   Weekly warfarin total:   15 mg   Plan last modified:   Laverne Ferguson RN (2018)   Next INR check:   7/10/2020   Priority:   Maintenance   Target end date:   Indefinite    Indications    Long-term (current) use of anticoagulants [Z79.01] [Z79.01]  Atrial  fibrillation (H) [I48.91]             Anticoagulation Episode Summary     INR check location:       Preferred lab:       Send INR reminders to:   CHELSEA CAMACHO    Comments:   okay to manage by exception      Anticoagulation Care Providers     Provider Role Specialty Phone number    Anya Nowak MD [facility] [phone]

## 2020-07-03 NOTE — PROGRESS NOTES
"Haresh Rock is a 70 year old male who is being evaluated via a billable video visit.      The patient has been notified of following:     \"This video visit will be conducted via a call between you and your physician/provider. We have found that certain health care needs can be provided without the need for an in-person physical exam.  This service lets us provide the care you need with a video conversation.  If a prescription is necessary we can send it directly to your pharmacy.  If lab work is needed we can place an order for that and you can then stop by our lab to have the test done at a later time.    Video visits are billed at different rates depending on your insurance coverage.  Please reach out to your insurance provider with any questions.    If during the course of the call the physician/provider feels a video visit is not appropriate, you will not be charged for this service.\"    Patient has given verbal consent for Video visit? Yes  How would you like to obtain your AVS? Celeste  Patient would like the video invitation sent by: Text to cell phone: [phone]  Will anyone else be joining your video visit? No      Subjective     Haresh Rock is a 70 year old male diabetic who presents today via video visit for the following health issues:    HPI  Medication Followup of warfarin ANTICOAGULANT (COUMADIN) 2.5 MG tablet     Taking Medication as prescribed: yes    Side Effects:  None    Medication Helping Symptoms:  yes          Video Start Time: 10:02 am    Diabetes Follow-up      How often are you checking your blood sugar? Not at all    What concerns do you have today about your diabetes? None     Do you have any of these symptoms? (Select all that apply)  No numbness or tingling in feet.  No redness, sores or blisters on feet.  No complaints of excessive thirst.  No reports of blurry vision.  No significant changes to weight.    Have you had a diabetic eye exam in the last 12 months? Yes- Date of " last eye exam: 1/1/2020,  Location: Outside eye facility        BP Readings from Last 2 Encounters:   02/18/20 136/78   08/20/19 137/79     Hemoglobin A1C (%)   Date Value   04/23/2019 6.9 (H)   01/25/2018 6.4 (H)     LDL Cholesterol Calculated (mg/dL)   Date Value   04/23/2019 68   01/25/2018 69               Hyperlipidemia Follow-Up      Are you regularly taking any medication or supplement to lower your cholesterol?   Yes- statin    Are you having muscle aches or other side effects that you think could be caused by your cholesterol lowering medication?  No      How many servings of fruits and vegetables do you eat daily?  0-1    On average, how many sweetened beverages do you drink each day (Examples: soda, juice, sweet tea, etc.  Do NOT count diet or artificially sweetened beverages)?   Not asked     How many days per week do you exercise enough to make your heart beat faster? 3 or less    How many minutes a day do you exercise enough to make your heart beat faster? 30 - 60    How many days per week do you miss taking your medication? 0      Current Outpatient Medications   Medication Sig Dispense Refill     ARTIFICIAL TEAR SOLUTION OP Apply  to eye. USE AS DIRECTED(DF)       carboxymethylcellul-glycerin (OPTIVE) 0.5-0.9 % SOLN ophthalmic solution        CENTRUM OR 1 TABLET DAILY       flecainide (TAMBOCOR) 50 MG tablet Take 1 tablet (50 mg) by mouth 2 times daily 180 tablet 3     folic acid (FOLVITE) 1 MG tablet Take 1 tablet (1,000 mcg) by mouth daily 90 tablet 3     metoprolol tartrate (LOPRESSOR) 25 MG tablet TAKE 1 TABLET BY MOUTH TWICE A  tablet 0     pantoprazole (PROTONIX) 20 MG EC tablet TAKE 1 TABLET (20 MG) BY MOUTH AT BEDTIME 90 tablet 3     simvastatin (ZOCOR) 10 MG tablet Take 1 tablet (10 mg) by mouth daily 90 tablet 3     warfarin ANTICOAGULANT (COUMADIN) 2.5 MG tablet Take 1.25 mg (1/2 tablet) Mon/Fri and 2.5 mg (1 tablet) all other days 72 tablet 1     PREVIDENT 5000 DRY MOUTH 1.1 % GEL  topical gel Take by mouth daily  3     Pseudoeph-Doxylamine-DM-APAP (NYQUIL PO) Take by mouth as needed       Allergies   Allergen Reactions     No Known Drug Allergy        Reviewed and updated as needed this visit by Provider  Tobacco  Allergies  Meds  Problems  Med Hx  Surg Hx  Fam Hx         Review of Systems   CONSTITUTIONAL: NEGATIVE for fever, chills, POSITIVE for weight loss, change in weight  INTEGUMENTARY/SKIN: NEGATIVE for worrisome rashes, moles or lesions  EYES: visual disturbance   RESP: NEGATIVE for significant cough or SOB  CV: NEGATIVE for chest pain, palpitations or peripheral edema  GI: difficulty swallowing solids for 1 year   MUSCULOSKELETAL: NEGATIVE for significant arthralgias or myalgia  NEURO: NEGATIVE for weakness, dizziness or paresthesias  ENDOCRINE: Hx thyroid nodules and Hx diabetes  HEME/ALLERGY/IMMUNE: on coumadin   PSYCHIATRIC: NEGATIVE for changes in mood or affect      Objective             Physical Exam     GENERAL: Healthy, alert and no distress  EYES: Eyes grossly normal to inspection.  No discharge or erythema, or obvious scleral/conjunctival abnormalities.  RESP: No audible wheeze, cough, or visible cyanosis.  No visible retractions or increased work of breathing.    SKIN: Visible skin clear. No significant rash, abnormal pigmentation or lesions.  NEURO: Cranial nerves grossly intact.  Mentation and speech appropriate for age.  PSYCH: Mentation appears normal, affect normal/bright, judgement and insight intact, normal speech and appearance well-groomed.      Diagnostic Test Results:  Labs reviewed in Epic  No results found. However, due to the size of the patient record, not all encounters were searched. Please check Results Review for a complete set of results.        Assessment & Plan     (I48.0) Paroxysmal atrial fibrillation (H)  (primary encounter diagnosis)  (I27.0) Primary pulmonary hypertension (H)  Comment: rate controlled and anticoagulated   Plan: CBC with  "platelets differential        Follow-up with cardiology as planned. Continue chronic anticoagulation under surveillance of protime clinic with goal INR 2 - 3 indefinitely      (R13.10) Dysphagia, unspecified type  Comment: no symptoms of GERD on chronic PPI   Plan: GASTROENTEROLOGY ADULT REF PROCEDURE ONLY, CBC         with platelets differential           (E11.22,  N18.2) Type 2 diabetes mellitus with stage 2 chronic kidney disease, without long-term current use of insulin (H)  Comment: diet controlled   Plan: Lipid panel reflex to direct LDL Fasting,         Albumin Random Urine Quantitative with Creat         Ratio, Hemoglobin A1c, TSH with free T4 reflex          (E78.5) Hyperlipidemia with target LDL less than 100  Comment: Well controlled with medications without side effects.   Plan: TSH with free T4 reflex, simvastatin (ZOCOR) 10        MG tablet          (E83.110) Hereditary hemochromatosis (H)  (D68.61) Antiphospholipid antibody syndrome (H)  Plan: warfarin ANTICOAGULANT (COUMADIN) 2.5 MG tablet        As above        BMI:   Estimated body mass index is 28.23 kg/m  as calculated from the following:    Height as of 2/18/20: 1.816 m (5' 11.5\").    Weight as of 2/18/20: 93.1 kg (205 lb 4.8 oz).   Weight management plan: Discussed healthy diet and exercise guidelines        See Patient Instructions    Return in about 1 week (around 7/13/2020) for lab only check.    Anya Nowak MD  [facility]      Video-Visit Details    Type of service:  Video Visit    Video End Time:10:21 am    Originating Location (pt. Location): Home    Distant Location (provider location):  [facility]     Platform used for Video Visit: Good    Return in about 1 week (around 7/13/2020) for lab only check.       Anya Nowak MD      "

## 2020-07-06 ENCOUNTER — VIRTUAL VISIT (OUTPATIENT)
Dept: FAMILY MEDICINE | Facility: CLINIC | Age: 70
End: 2020-07-06
Payer: COMMERCIAL

## 2020-07-06 DIAGNOSIS — E83.110 HEREDITARY HEMOCHROMATOSIS (H): ICD-10-CM

## 2020-07-06 DIAGNOSIS — E78.5 HYPERLIPIDEMIA WITH TARGET LDL LESS THAN 100: ICD-10-CM

## 2020-07-06 DIAGNOSIS — E11.22 TYPE 2 DIABETES MELLITUS WITH STAGE 2 CHRONIC KIDNEY DISEASE, WITHOUT LONG-TERM CURRENT USE OF INSULIN (H): ICD-10-CM

## 2020-07-06 DIAGNOSIS — R13.10 DYSPHAGIA, UNSPECIFIED TYPE: ICD-10-CM

## 2020-07-06 DIAGNOSIS — I48.0 PAROXYSMAL ATRIAL FIBRILLATION (H): Primary | ICD-10-CM

## 2020-07-06 DIAGNOSIS — I27.0 PRIMARY PULMONARY HYPERTENSION (H): ICD-10-CM

## 2020-07-06 DIAGNOSIS — D68.61 ANTIPHOSPHOLIPID ANTIBODY SYNDROME (H): ICD-10-CM

## 2020-07-06 DIAGNOSIS — N18.2 TYPE 2 DIABETES MELLITUS WITH STAGE 2 CHRONIC KIDNEY DISEASE, WITHOUT LONG-TERM CURRENT USE OF INSULIN (H): ICD-10-CM

## 2020-07-06 PROCEDURE — 99214 OFFICE O/P EST MOD 30 MIN: CPT | Mod: 95 | Performed by: FAMILY MEDICINE

## 2020-07-06 RX ORDER — SIMVASTATIN 10 MG
10 TABLET ORAL DAILY
Qty: 90 TABLET | Refills: 3 | Status: SHIPPED | OUTPATIENT
Start: 2020-07-06 | End: 2021-07-27

## 2020-07-06 RX ORDER — WARFARIN SODIUM 2.5 MG/1
TABLET ORAL
Qty: 72 TABLET | Refills: 1 | Status: SHIPPED | OUTPATIENT
Start: 2020-07-06 | End: 2020-07-28

## 2020-07-10 ENCOUNTER — TRANSFERRED RECORDS (OUTPATIENT)
Dept: HEALTH INFORMATION MANAGEMENT | Facility: CLINIC | Age: 70
End: 2020-07-10

## 2020-07-10 ENCOUNTER — ANTICOAGULATION THERAPY VISIT (OUTPATIENT)
Dept: FAMILY MEDICINE | Facility: CLINIC | Age: 70
End: 2020-07-10

## 2020-07-10 DIAGNOSIS — Z79.01 LONG TERM CURRENT USE OF ANTICOAGULANT THERAPY: ICD-10-CM

## 2020-07-10 DIAGNOSIS — I48.0 PAROXYSMAL ATRIAL FIBRILLATION (H): ICD-10-CM

## 2020-07-10 LAB — INR PPP: 2.6 (ref 0.9–1.1)

## 2020-07-10 NOTE — PROGRESS NOTES
Electrophysiology Outpatient Follow up Note      07/10/2020    REASON FOR VISIT:  Followup of paroxysmal atrial fibrillation and atrial tachycardia.      HISTORY OF PRESENT ILLNESS:  The patient is a 70-year-old gentleman with a history of paroxysmal atrial fibrillation and atrial tachycardia.  He is status post circumferential pulmonary vein isolation for paroxysmal atrial fibrillation in 03/2013, which provided him with an arrhythmia period lasting for about 5 years. Towards the end of 2017, he had recurrent atrial fibrillation and underwent re-isolation of pulmonary veins in 01/2018.  During that ablation, the patient was also found to have a right atrial tachycardia for which he underwent an ablation.  Of note, during that ablation an atypical atrial flutter was also noted which kept degenerating to atrial fibrillation and was not mappable.In September 2018, his sotalol dose of 40 mg twice daily was increased to 80 mg twice daily. Despite the increase, a Zio Patch done in September and 10/2018 showed 55 episodes of atrial tachycardia, the longest one lasting for several hours. The patient was not symptomatic for all of the episodes. At his last appointment in October 2018, sotalol was discontinued, and patient was started on metoprolol and then flecainide was initiated. In October 2018, exercise stress test on flecainide did not show evidence of VT and no significant QRS widening (sub-optimal test with 77% of the age predicted maximal heart rate achieved).       Interval History:  Patient reports he has felt great lately with no acute issues and reports his heart rate trend on his Apple watch continues to be well controlled (HR 60-90/min). Pt denies any symptoms of dyspnea, exertional angina, palpitations, presyncope or syncope.    ROS:  A complete 10-point ROS was negative except as above.    PAST MEDICAL HISTORY:  Past Medical History:   Diagnosis Date     Antiphospholipid antibody syndrome (H) 2005    Paulette      Atrial fibrillation (H) 4/2011     Cirrhosis (H)      CKD (chronic kidney disease) stage 1, GFR 90 ml/min or greater      CKD (chronic kidney disease) stage 2, GFR 60-89 ml/min      Diabetes mellitus type II     steroid induced     DJD (degenerative joint disease) of knee     SHAWN, worse on right     DVT (deep venous thrombosis) (H)      ED (erectile dysfunction)      Gallbladder polyp 5/2010     Lpnah-Ikebov-Tqtz Disease 2007    BMT     Hemochromatosis      Hodgkin's disease NOS     5 cycles of ABVD in 2011     HTN (hypertension)      Hyperlipidaemia LDL goal <100      Multiple thyroid nodules     benign      Myeloproliferative disorder (H)     atypical, [facility]     PE (pulmonary embolism) 11/2004     Polyneuropathy      Primary pulmonary hypertension (H)        PAST SURGICAL HISTORY:  Past Surgical History:   Procedure Laterality Date     ANESTHESIA CARDIOVERSION  4/21/2011    Procedure:ANESTHESIA CARDIOVERSION; Surgeon:GENERIC ANESTHESIA PROVIDER; Location:[facility] OR     ARTHROSCOPY KNEE RT/LT      LT     CATARACT IOL, RT/LT  2017     COLONOSCOPY  10/16/2012    Procedure: COLONOSCOPY;  Colonoscopy, screening;  Surgeon: Reg Feliciano MD;  Location: [facility] OR     H ABLATION FOCAL AFIB  3/5/2013    PVI     H ABLATION FOCAL AFIB  01/01/2018     HC BONE MARROW/STEM TRANSPLANT ALLOGENIC  5/8/2007     INSERT PORT VASCULAR ACCESS       JOINT REPLACEMTN, KNEE RT/LT  3/28/12    HSAWN     VASECTOMY  1990       FAMILY HISTORY:  Family History   Problem Relation Age of Onset     Hypertension Mother      Cerebrovascular Disease Mother      Breast Cancer Mother      Arrhythmia Brother      Thyroid Cancer Daughter      Bipolar Disorder Daughter      Arrhythmia Sister      Alzheimer Disease Father      Diabetes Paternal Aunt      Gastrointestinal Disease Maternal Grandmother         colitis      Thyroid Disease Sister      C.A.D. No family hx of        SOCIAL HISTORY:  Social History     Socioeconomic History     Marital status:       Spouse name: Angelica     Number of children: 2     Years of education: 21     Highest education level: Not on file   Social Needs     Financial resource strain: Not on file     Food insecurity - worry: Not on file     Food insecurity - inability: Not on file     Transportation needs - medical: Not on file     Transportation needs - non-medical: Not on file   Occupational History     Occupation:      Employer: [employer]     Employer: [employer]   Tobacco Use     Smoking status: Never Smoker     Smokeless tobacco: Never Used   Substance and Sexual Activity     Alcohol use: No     Drug use: No     Sexual activity: Not Currently     Partners: Female   Other Topics Concern     Parent/sibling w/ CABG, MI or angioplasty before 65F 55M? No   Social History Narrative     Not on file       MEDICATIONS:  Current Outpatient Medications   Medication     ARTIFICIAL TEAR SOLUTION OP     carboxymethylcellul-glycerin (OPTIVE) 0.5-0.9 % SOLN ophthalmic solution     CENTRUM OR     flecainide (TAMBOCOR) 50 MG tablet     folic acid (FOLVITE) 1 MG tablet     metoprolol tartrate (LOPRESSOR) 25 MG tablet     pantoprazole (PROTONIX) 20 MG EC tablet     PREVIDENT 5000 DRY MOUTH 1.1 % GEL topical gel     Pseudoeph-Doxylamine-DM-APAP (NYQUIL PO)     simvastatin (ZOCOR) 10 MG tablet     warfarin ANTICOAGULANT (COUMADIN) 2.5 MG tablet     No current facility-administered medications for this visit.        ALLERGIES:     Allergies   Allergen Reactions     No Known Drug Allergy        PHYSICAL EXAM:  There were no vitals taken for this visit.  Gen: alert, interactive, NAD  HEENT: atraumatic, EOMI, MMM  Neck: supple, no JVD  CV: RRR, no m/r/g  Chest: CTAB, no wheezes or crackles  Abd: soft, NT, ND, +BS  Ext: no LE edema, 2+ peripheral pulses  Skin: warm and dry, no rashes on exposed surfaces  Neuro: alert and oriented, no focal deficits    LABS:    CMP  Last Comprehensive Metabolic Panel:  Sodium   Date Value Ref Range Status    02/18/2020 138 133 - 144 mmol/L Final     Potassium   Date Value Ref Range Status   02/18/2020 4.3 3.4 - 5.3 mmol/L Final     Chloride   Date Value Ref Range Status   02/18/2020 106 94 - 109 mmol/L Final     Carbon Dioxide   Date Value Ref Range Status   02/18/2020 29 20 - 32 mmol/L Final     Anion Gap   Date Value Ref Range Status   02/18/2020 3 3 - 14 mmol/L Final     Glucose   Date Value Ref Range Status   02/18/2020 161 (H) 70 - 99 mg/dL Final     Urea Nitrogen   Date Value Ref Range Status   02/18/2020 21 7 - 30 mg/dL Final     Creatinine   Date Value Ref Range Status   02/18/2020 1.12 0.66 - 1.25 mg/dL Final     GFR Estimate   Date Value Ref Range Status   02/18/2020 65 >60 mL/min/[1.73_m2] Final     Comment:     Non  GFR Calc  Starting 12/18/2018, serum creatinine based estimated GFR (eGFR) will be   calculated using the Chronic Kidney Disease Epidemiology Collaboration   (CKD-EPI) equation.       Calcium   Date Value Ref Range Status   02/18/2020 8.5 8.5 - 10.1 mg/dL Final     Bilirubin Total   Date Value Ref Range Status   02/18/2020 0.9 0.2 - 1.3 mg/dL Final     Alkaline Phosphatase   Date Value Ref Range Status   02/18/2020 115 40 - 150 U/L Final     ALT   Date Value Ref Range Status   02/18/2020 24 0 - 70 U/L Final     AST   Date Value Ref Range Status   02/18/2020 31 0 - 45 U/L Final       CBC  Lab Results   Component Value Date    WBC 7.6 02/13/2019     Lab Results   Component Value Date    RBC 5.31 02/13/2019     Lab Results   Component Value Date    HGB 17.7 02/13/2019     Lab Results   Component Value Date    HCT 50.1 02/13/2019     Lab Results   Component Value Date    MCV 94 02/13/2019     Lab Results   Component Value Date    MCH 33.3 02/13/2019     Lab Results   Component Value Date    MCHC 35.3 02/13/2019     Lab Results   Component Value Date    RDW 12.8 02/13/2019     Lab Results   Component Value Date     02/13/2019     TROP  Lab Results   Component Value Date     TROPI 0.025 12/20/2008    TROPONIN 0.05 10/15/2011    TROPONIN 0.08 09/30/2011    TROPONIN 0.00 08/08/2011    TROPONIN 0.00 06/14/2011    TROPONIN 0.01 04/20/2011       LIPIDS  Recent Labs   Lab Test 04/23/19  0846 01/25/18  0738  11/16/15  1155 07/09/14  1511   CHOL 128 122   < > 126 168   HDL 31* 31*   < > 31* 29*   LDL 68 69   < > 71 104   TRIG 143 111   < > 119 174*   CHOLHDLRATIO  --   --   --  4.1 5.8*    < > = values in this interval not displayed.     TSH  TSH   Date Value Ref Range Status   05/22/2018 2.38 0.40 - 4.00 mU/L Final       HBA1C  Lab Results   Component Value Date    A1C 6.4 01/25/2018    A1C 6.7 11/29/2016    A1C 6.6 04/18/2016    A1C 6.6 11/16/2015    A1C 6.3 10/21/2014       CARDIAC DATA:  Personally reviewed recent images including recent exercise stress test and EKGs.    ASSESSMENT/PLAN:  1.  Paroxysmal atrial fibrillation, status post circumferential pulmonary vein isolation in 2013 with repeat isolation in 01/2018 - no known recurrence since the second ablation. [id number] score 5.  2.  Paroxysmal atrial tachycardia - refractory to sotalol 80 mg twice daily.     - Continue flecainide 50 mg BID.  - Continue metoprolol 25 mg BID.  - Continue warfarin with INR goal 2-3.  - Continue heart rate monitoring on Apple watch.     RTC: 1 year    Pt seen and discussed with Dr. Matt.     Wilda Taylor MD  Cardiology Fellow, PGY-4  [phone]     I have seen, interviewed, and examined patient. I have reviewed the laboratory tests, imaging, and other investigations. I have reviewed the management plan with the patient. I discussed with the team and agree with the findings and plan in this resident/fellow/nurse practitioner's note. In addition, changes in the physical examination, assessment and plan have been incorporated into the note by myself, as to make it a single cohesive document.       Bekah Matt MD, MS  Cardiology/Cardiac EP Attending Staff

## 2020-07-10 NOTE — PROGRESS NOTES
ANTICOAGULATION  MANAGEMENT-Patient Home Monitoring Result    Assessment     Therapeutic INR result of 2.6 . Goal range 2.0-3.0. Received via fax from [employer] home INR monitoring company.        Previous INR was therapeutic    Haresh was last contacted by phone: 7/3    Plan     Per home monitoring agreement with patient, patient was NOT contacted regarding therapeutic result today.  Patient is to continue current dose and continue to check INR with home monitor per protocol.  ?       OBJECTIVE    INR   Date Value Ref Range Status   07/10/2020 2.6 (A) 0.90 - 1.10 Final     Factor 2 Assay   Date Value Ref Range Status   2007 58 (L) 60 - 140 % Final     Comment:     (Note)  CALLED TO TAMARA(INTERV. RADIOLOGY)CG1026,        ASSESSMENT / PLAN  No question data found.  Anticoagulation Summary  As of 7/10/2020    INR goal:   2.0-3.0   TTR:   90.7 % (1 y)   INR used for dosin.6 (7/10/2020)   Warfarin maintenance plan:   1.25 mg (2.5 mg x 0.5) every Mon, Fri; 2.5 mg (2.5 mg x 1) all other days   Full warfarin instructions:   1.25 mg every Mon, Fri; 2.5 mg all other days   Weekly warfarin total:   15 mg   No change documented:   Allyn Doe RN   Plan last modified:   Laverne Ferguson, RN (2018)   Next INR check:   2020   Priority:   Maintenance   Target end date:   Indefinite    Indications    Long-term (current) use of anticoagulants [Z79.01] [Z79.01]  Atrial fibrillation (H) [I48.91]             Anticoagulation Episode Summary     INR check location:       Preferred lab:   EXTERNAL LAB    Send INR reminders to:   CHELSEA CHILEL    Comments:   JESSICA rodas to manage by exception      Anticoagulation Care Providers     Provider Role Specialty Phone number    Anya Nowak MD [facility] [phone]

## 2020-07-13 ENCOUNTER — OFFICE VISIT (OUTPATIENT)
Dept: CARDIOLOGY | Facility: CLINIC | Age: 70
End: 2020-07-13
Payer: COMMERCIAL

## 2020-07-13 VITALS
DIASTOLIC BLOOD PRESSURE: 76 MMHG | SYSTOLIC BLOOD PRESSURE: 123 MMHG | WEIGHT: 188 LBS | OXYGEN SATURATION: 96 % | BODY MASS INDEX: 25.86 KG/M2 | TEMPERATURE: 96.7 F | HEART RATE: 72 BPM

## 2020-07-13 DIAGNOSIS — I47.10 PAROXYSMAL SUPRAVENTRICULAR TACHYCARDIA (H): ICD-10-CM

## 2020-07-13 DIAGNOSIS — I48.0 PAROXYSMAL ATRIAL FIBRILLATION (H): Primary | ICD-10-CM

## 2020-07-13 PROCEDURE — 99214 OFFICE O/P EST MOD 30 MIN: CPT | Mod: GC | Performed by: INTERNAL MEDICINE

## 2020-07-13 RX ORDER — METOPROLOL TARTRATE 25 MG/1
TABLET, FILM COATED ORAL
Qty: 180 TABLET | Refills: 3 | Status: SHIPPED | OUTPATIENT
Start: 2020-07-13 | End: 2021-05-26

## 2020-07-13 NOTE — LETTER
7/13/2020      RE: Haresh Rock  [address] [place] [address]       Dear Colleague,    Thank you for the opportunity to participate in the care of your patient, Haresh Rock, at the [facility] HEART AT [facility]. Please see a copy of my visit note below.    Electrophysiology Outpatient Follow up Note      07/10/2020    REASON FOR VISIT:  Followup of paroxysmal atrial fibrillation and atrial tachycardia.      HISTORY OF PRESENT ILLNESS:  The patient is a 70-year-old gentleman with a history of paroxysmal atrial fibrillation and atrial tachycardia.  He is status post circumferential pulmonary vein isolation for paroxysmal atrial fibrillation in 03/2013, which provided him with an arrhythmia period lasting for about 5 years. Towards the end of 2017, he had recurrent atrial fibrillation and underwent re-isolation of pulmonary veins in 01/2018.  During that ablation, the patient was also found to have a right atrial tachycardia for which he underwent an ablation.  Of note, during that ablation an atypical atrial flutter was also noted which kept degenerating to atrial fibrillation and was not mappable.In September 2018, his sotalol dose of 40 mg twice daily was increased to 80 mg twice daily. Despite the increase, a Zio Patch done in September and 10/2018 showed 55 episodes of atrial tachycardia, the longest one lasting for several hours. The patient was not symptomatic for all of the episodes. At his last appointment in October 2018, sotalol was discontinued, and patient was started on metoprolol and then flecainide was initiated. In October 2018, exercise stress test on flecainide did not show evidence of VT and no significant QRS widening (sub-optimal test with 77% of the age predicted maximal heart rate achieved).       Interval History:  Patient reports he has felt great lately with no acute issues and reports his heart  rate trend on his Apple watch continues to be well controlled (HR 60-90/min). Pt denies any symptoms of dyspnea, exertional angina, palpitations, presyncope or syncope.    ROS:  A complete 10-point ROS was negative except as above.    PAST MEDICAL HISTORY:  Past Medical History:   Diagnosis Date     Antiphospholipid antibody syndrome (H) 2005    Ravi's     Atrial fibrillation (H) 4/2011     Cirrhosis (H)      CKD (chronic kidney disease) stage 1, GFR 90 ml/min or greater      CKD (chronic kidney disease) stage 2, GFR 60-89 ml/min      Diabetes mellitus type II     steroid induced     DJD (degenerative joint disease) of knee     SHAWN, worse on right     DVT (deep venous thrombosis) (H)      ED (erectile dysfunction)      Gallbladder polyp 5/2010     Cugms-Kyghvr-Tgwv Disease 2007    BMT     Hemochromatosis      Hodgkin's disease NOS     5 cycles of ABVD in 2011     HTN (hypertension)      Hyperlipidaemia LDL goal <100      Multiple thyroid nodules     benign      Myeloproliferative disorder (H)     atypical, [facility]     PE (pulmonary embolism) 11/2004     Polyneuropathy      Primary pulmonary hypertension (H)        PAST SURGICAL HISTORY:  Past Surgical History:   Procedure Laterality Date     ANESTHESIA CARDIOVERSION  4/21/2011    Procedure:ANESTHESIA CARDIOVERSION; Surgeon:GENERIC ANESTHESIA PROVIDER; Location:[facility] OR     ARTHROSCOPY KNEE RT/LT      LT     CATARACT IOL, RT/LT  2017     COLONOSCOPY  10/16/2012    Procedure: COLONOSCOPY;  Colonoscopy, screening;  Surgeon: Reg Feliciano MD;  Location: [facility] OR     H ABLATION FOCAL AFIB  3/5/2013    PVI     H ABLATION FOCAL AFIB  01/01/2018     HC BONE MARROW/STEM TRANSPLANT ALLOGENIC  5/8/2007     INSERT PORT VASCULAR ACCESS       JOINT REPLACEMTN, KNEE RT/LT  3/28/12    SHAWN     VASECTOMY  1990       FAMILY HISTORY:  Family History   Problem Relation Age of Onset     Hypertension Mother      Cerebrovascular Disease Mother      Breast Cancer Mother      Arrhythmia  Brother      Thyroid Cancer Daughter      Bipolar Disorder Daughter      Arrhythmia Sister      Alzheimer Disease Father      Diabetes Paternal Aunt      Gastrointestinal Disease Maternal Grandmother         colitis      Thyroid Disease Sister      C.A.D. No family hx of        SOCIAL HISTORY:  Social History     Socioeconomic History     Marital status:      Spouse name: Angelica     Number of children: 2     Years of education: 21     Highest education level: Not on file   Social Needs     Financial resource strain: Not on file     Food insecurity - worry: Not on file     Food insecurity - inability: Not on file     Transportation needs - medical: Not on file     Transportation needs - non-medical: Not on file   Occupational History     Occupation:      Employer: [employer]     Employer: [employer]   Tobacco Use     Smoking status: Never Smoker     Smokeless tobacco: Never Used   Substance and Sexual Activity     Alcohol use: No     Drug use: No     Sexual activity: Not Currently     Partners: Female   Other Topics Concern     Parent/sibling w/ CABG, MI or angioplasty before 65F 55M? No   Social History Narrative     Not on file       MEDICATIONS:  Current Outpatient Medications   Medication     ARTIFICIAL TEAR SOLUTION OP     carboxymethylcellul-glycerin (OPTIVE) 0.5-0.9 % SOLN ophthalmic solution     CENTRUM OR     flecainide (TAMBOCOR) 50 MG tablet     folic acid (FOLVITE) 1 MG tablet     metoprolol tartrate (LOPRESSOR) 25 MG tablet     pantoprazole (PROTONIX) 20 MG EC tablet     PREVIDENT 5000 DRY MOUTH 1.1 % GEL topical gel     Pseudoeph-Doxylamine-DM-APAP (NYQUIL PO)     simvastatin (ZOCOR) 10 MG tablet     warfarin ANTICOAGULANT (COUMADIN) 2.5 MG tablet     No current facility-administered medications for this visit.        ALLERGIES:     Allergies   Allergen Reactions     No Known Drug Allergy        PHYSICAL EXAM:  There were no vitals taken for this visit.  Gen: alert, interactive,  NAD  HEENT: atraumatic, EOMI, MMM  Neck: supple, no JVD  CV: RRR, no m/r/g  Chest: CTAB, no wheezes or crackles  Abd: soft, NT, ND, +BS  Ext: no LE edema, 2+ peripheral pulses  Skin: warm and dry, no rashes on exposed surfaces  Neuro: alert and oriented, no focal deficits    LABS:    CMP  Last Comprehensive Metabolic Panel:  Sodium   Date Value Ref Range Status   02/18/2020 138 133 - 144 mmol/L Final     Potassium   Date Value Ref Range Status   02/18/2020 4.3 3.4 - 5.3 mmol/L Final     Chloride   Date Value Ref Range Status   02/18/2020 106 94 - 109 mmol/L Final     Carbon Dioxide   Date Value Ref Range Status   02/18/2020 29 20 - 32 mmol/L Final     Anion Gap   Date Value Ref Range Status   02/18/2020 3 3 - 14 mmol/L Final     Glucose   Date Value Ref Range Status   02/18/2020 161 (H) 70 - 99 mg/dL Final     Urea Nitrogen   Date Value Ref Range Status   02/18/2020 21 7 - 30 mg/dL Final     Creatinine   Date Value Ref Range Status   02/18/2020 1.12 0.66 - 1.25 mg/dL Final     GFR Estimate   Date Value Ref Range Status   02/18/2020 65 >60 mL/min/[1.73_m2] Final     Comment:     Non  GFR Calc  Starting 12/18/2018, serum creatinine based estimated GFR (eGFR) will be   calculated using the Chronic Kidney Disease Epidemiology Collaboration   (CKD-EPI) equation.       Calcium   Date Value Ref Range Status   02/18/2020 8.5 8.5 - 10.1 mg/dL Final     Bilirubin Total   Date Value Ref Range Status   02/18/2020 0.9 0.2 - 1.3 mg/dL Final     Alkaline Phosphatase   Date Value Ref Range Status   02/18/2020 115 40 - 150 U/L Final     ALT   Date Value Ref Range Status   02/18/2020 24 0 - 70 U/L Final     AST   Date Value Ref Range Status   02/18/2020 31 0 - 45 U/L Final       CBC  Lab Results   Component Value Date    WBC 7.6 02/13/2019     Lab Results   Component Value Date    RBC 5.31 02/13/2019     Lab Results   Component Value Date    HGB 17.7 02/13/2019     Lab Results   Component Value Date    HCT 50.1  02/13/2019     Lab Results   Component Value Date    MCV 94 02/13/2019     Lab Results   Component Value Date    MCH 33.3 02/13/2019     Lab Results   Component Value Date    MCHC 35.3 02/13/2019     Lab Results   Component Value Date    RDW 12.8 02/13/2019     Lab Results   Component Value Date     02/13/2019     TROP  Lab Results   Component Value Date    TROPI 0.025 12/20/2008    TROPONIN 0.05 10/15/2011    TROPONIN 0.08 09/30/2011    TROPONIN 0.00 08/08/2011    TROPONIN 0.00 06/14/2011    TROPONIN 0.01 04/20/2011       LIPIDS  Recent Labs   Lab Test 04/23/19  0846 01/25/18  0738  11/16/15  1155 07/09/14  1511   CHOL 128 122   < > 126 168   HDL 31* 31*   < > 31* 29*   LDL 68 69   < > 71 104   TRIG 143 111   < > 119 174*   CHOLHDLRATIO  --   --   --  4.1 5.8*    < > = values in this interval not displayed.     TSH  TSH   Date Value Ref Range Status   05/22/2018 2.38 0.40 - 4.00 mU/L Final       HBA1C  Lab Results   Component Value Date    A1C 6.4 01/25/2018    A1C 6.7 11/29/2016    A1C 6.6 04/18/2016    A1C 6.6 11/16/2015    A1C 6.3 10/21/2014       CARDIAC DATA:  Personally reviewed recent images including recent exercise stress test and EKGs.    ASSESSMENT/PLAN:  1.  Paroxysmal atrial fibrillation, status post circumferential pulmonary vein isolation in 2013 with repeat isolation in 01/2018 - no known recurrence since the second ablation. [id number] score 5.  2.  Paroxysmal atrial tachycardia - refractory to sotalol 80 mg twice daily.     - Continue flecainide 50 mg BID.  - Continue metoprolol 25 mg BID.  - Continue warfarin with INR goal 2-3.  - Continue heart rate monitoring on Apple watch.     RTC: 1 year    Pt seen and discussed with Dr. Matt.     Wilda Taylor MD  Cardiology Fellow, PGY-4  [phone]     I have seen, interviewed, and examined patient. I have reviewed the laboratory tests, imaging, and other investigations. I have reviewed the management plan with the patient. I discussed with the  team and agree with the findings and plan in this resident/fellow/nurse practitioner's note. In addition, changes in the physical examination, assessment and plan have been incorporated into the note by myself, as to make it a single cohesive document.       Bekah Matt MD, MS  Cardiology/Cardiac EP Attending Staff      Please do not hesitate to contact me if you have any questions/concerns.     Sincerely,     Bekah Matt MD

## 2020-07-13 NOTE — PATIENT INSTRUCTIONS
Thank you for coming to the [facility] @ [facility]; please note the following instructions:    1. Continue same medication regimen    2.  Follow up in 1 year.        If you have any questions regarding your visit please contact your care team:     Cardiology  Telephone Number   Tiffany PÉREZ, RN  Salma KELSEY,RN  Mandi RAMIREZ, PRIMOA  Annabelle NEFF, MA  Kevin RUFFIN, LPN   [phone]   (select option 1)    *After hours: [phone]     For scheduling appts:     [phone] or    [phone] (select option 1)    *After hours: [phone]     For the Device Clinic (Pacemakers and ICD's)  RN's :  Chelsey Carrera   During business hours: [phone]    *After business hours:  [phone] (select option 4)      Normal test result notifications will be released via [employer] or mailed within 7 business days.  All other test results, will be communicated via telephone once reviewed by your cardiologist.    If you need a medication refill please contact your pharmacy.  Please allow 3 business days for your refill to be completed.    As always, thank you for trusting us with your health care needs!

## 2020-07-13 NOTE — NURSING NOTE
"Chief Complaint   Patient presents with     Atrial Fib     Per patient sob.with exercise ,lightheaded if movement fast at times        Initial /76 (BP Location: Left arm, Patient Position: Sitting, Cuff Size: Adult Large)   Pulse 72   Wt 85.3 kg (188 lb)   SpO2 96%   BMI 25.86 kg/m   Estimated body mass index is 25.86 kg/m  as calculated from the following:    Height as of 2/18/20: 1.816 m (5' 11.5\").    Weight as of this encounter: 85.3 kg (188 lb)..  BP completed using cuff size: large    Kevin De La Cruz L.P.N.    "

## 2020-07-17 ENCOUNTER — ANTICOAGULATION THERAPY VISIT (OUTPATIENT)
Dept: FAMILY MEDICINE | Facility: CLINIC | Age: 70
End: 2020-07-17

## 2020-07-17 ENCOUNTER — TRANSFERRED RECORDS (OUTPATIENT)
Dept: HEALTH INFORMATION MANAGEMENT | Facility: CLINIC | Age: 70
End: 2020-07-17

## 2020-07-17 DIAGNOSIS — I48.0 PAROXYSMAL ATRIAL FIBRILLATION (H): ICD-10-CM

## 2020-07-17 DIAGNOSIS — Z79.01 LONG TERM CURRENT USE OF ANTICOAGULANT THERAPY: ICD-10-CM

## 2020-07-17 LAB — INR PPP: 3 (ref 0.9–1.1)

## 2020-07-17 NOTE — PROGRESS NOTES
ANTICOAGULATION  MANAGEMENT-Patient Home Monitoring Result    Assessment     Therapeutic INR result of 3.0 . Goal range 2.0-3.0. Received via fax from [employer] INR monitoring company.        Previous INR was therapeutic    Haresh was last contacted by phone: 7/3    Plan     Per home monitoring agreement with patient, patient was NOT contacted regarding therapeutic result today.  Patient is to continue current dose and continue to check INR with home monitor per protocol.  ?       OBJECTIVE    INR   Date Value Ref Range Status   07/17/2020 3.0 (A) 0.90 - 1.10 Final     Factor 2 Assay   Date Value Ref Range Status   05/01/2007 58 (L) 60 - 140 % Final     Comment:     (Note)  CALLED TO TAMARA(INTERV. RADIOLOGY)[id number],        ASSESSMENT / PLAN  No question data found.  Anticoagulation Summary  As of 7/17/2020    INR goal:   2.0-3.0   TTR:   90.7 % (1 y)   INR used for dosing:   3.0 (7/17/2020)   Warfarin maintenance plan:   1.25 mg (2.5 mg x 0.5) every Mon, Fri; 2.5 mg (2.5 mg x 1) all other days   Full warfarin instructions:   1.25 mg every Mon, Fri; 2.5 mg all other days   Weekly warfarin total:   15 mg   Plan last modified:   Laverne Ferguson RN (11/14/2018)   Next INR check:   7/24/2020   Priority:   Maintenance   Target end date:   Indefinite    Indications    Long-term (current) use of anticoagulants [Z79.01] [Z79.01]  Atrial fibrillation (H) [I48.91]             Anticoagulation Episode Summary     INR check location:       Preferred lab:   EXTERNAL LAB    Send INR reminders to:   CHELSEA CHILEL    Comments:   [employer]ESSICA rodas to manage by exception      Anticoagulation Care Providers     Provider Role Specialty Phone number    Anya Nowak MD [facility] [phone]

## 2020-07-20 ENCOUNTER — TELEPHONE (OUTPATIENT)
Dept: FAMILY MEDICINE | Facility: CLINIC | Age: 70
End: 2020-07-20

## 2020-07-20 DIAGNOSIS — I48.91 ATRIAL FIBRILLATION (H): ICD-10-CM

## 2020-07-20 DIAGNOSIS — E83.110 HEREDITARY HEMOCHROMATOSIS (H): ICD-10-CM

## 2020-07-20 DIAGNOSIS — C81.99 HODGKIN'S DISEASE OF EXTRANODAL OR SOLID ORGAN SITE (H): ICD-10-CM

## 2020-07-20 DIAGNOSIS — E11.22 TYPE 2 DIABETES MELLITUS WITH STAGE 2 CHRONIC KIDNEY DISEASE, WITHOUT LONG-TERM CURRENT USE OF INSULIN (H): ICD-10-CM

## 2020-07-20 DIAGNOSIS — I48.0 PAROXYSMAL ATRIAL FIBRILLATION (H): ICD-10-CM

## 2020-07-20 DIAGNOSIS — Z79.01 LONG TERM CURRENT USE OF ANTICOAGULANT THERAPY: ICD-10-CM

## 2020-07-20 DIAGNOSIS — R13.10 DYSPHAGIA, UNSPECIFIED TYPE: ICD-10-CM

## 2020-07-20 DIAGNOSIS — D47.1 MYELOPROLIFERATIVE DISORDER (H): ICD-10-CM

## 2020-07-20 DIAGNOSIS — N18.2 TYPE 2 DIABETES MELLITUS WITH STAGE 2 CHRONIC KIDNEY DISEASE, WITHOUT LONG-TERM CURRENT USE OF INSULIN (H): ICD-10-CM

## 2020-07-20 DIAGNOSIS — E78.5 HYPERLIPIDEMIA WITH TARGET LDL LESS THAN 100: ICD-10-CM

## 2020-07-20 DIAGNOSIS — D89.811 GRAFT-VERSUS-HOST DISEASE, CHRONIC (H): ICD-10-CM

## 2020-07-20 LAB
BASOPHILS # BLD AUTO: 0 10E9/L (ref 0–0.2)
BASOPHILS NFR BLD AUTO: 0.4 %
DIFFERENTIAL METHOD BLD: ABNORMAL
EOSINOPHIL # BLD AUTO: 0.1 10E9/L (ref 0–0.7)
EOSINOPHIL NFR BLD AUTO: 1.2 %
ERYTHROCYTE [DISTWIDTH] IN BLOOD BY AUTOMATED COUNT: 12.4 % (ref 10–15)
HBA1C MFR BLD: 6.3 % (ref 0–5.6)
HCT VFR BLD AUTO: 45.8 % (ref 40–53)
HGB BLD-MCNC: 16.5 G/DL (ref 13.3–17.7)
INR PPP: 2.7 (ref 0.86–1.14)
LYMPHOCYTES # BLD AUTO: 2.8 10E9/L (ref 0.8–5.3)
LYMPHOCYTES NFR BLD AUTO: 36.8 %
MCH RBC QN AUTO: 32.3 PG (ref 26.5–33)
MCHC RBC AUTO-ENTMCNC: 36 G/DL (ref 31.5–36.5)
MCV RBC AUTO: 90 FL (ref 78–100)
MONOCYTES # BLD AUTO: 0.7 10E9/L (ref 0–1.3)
MONOCYTES NFR BLD AUTO: 9.3 %
NEUTROPHILS # BLD AUTO: 4 10E9/L (ref 1.6–8.3)
NEUTROPHILS NFR BLD AUTO: 52.3 %
PLATELET # BLD AUTO: 118 10E9/L (ref 150–450)
RBC # BLD AUTO: 5.11 10E12/L (ref 4.4–5.9)
WBC # BLD AUTO: 7.7 10E9/L (ref 4–11)

## 2020-07-20 PROCEDURE — 82728 ASSAY OF FERRITIN: CPT | Performed by: INTERNAL MEDICINE

## 2020-07-20 PROCEDURE — 83036 HEMOGLOBIN GLYCOSYLATED A1C: CPT | Performed by: FAMILY MEDICINE

## 2020-07-20 PROCEDURE — 84165 PROTEIN E-PHORESIS SERUM: CPT | Performed by: INTERNAL MEDICINE

## 2020-07-20 PROCEDURE — 83615 LACTATE (LD) (LDH) ENZYME: CPT | Performed by: INTERNAL MEDICINE

## 2020-07-20 PROCEDURE — 80053 COMPREHEN METABOLIC PANEL: CPT | Performed by: INTERNAL MEDICINE

## 2020-07-20 PROCEDURE — 84443 ASSAY THYROID STIM HORMONE: CPT | Performed by: FAMILY MEDICINE

## 2020-07-20 PROCEDURE — 82043 UR ALBUMIN QUANTITATIVE: CPT | Performed by: FAMILY MEDICINE

## 2020-07-20 PROCEDURE — 83550 IRON BINDING TEST: CPT | Performed by: INTERNAL MEDICINE

## 2020-07-20 PROCEDURE — 83540 ASSAY OF IRON: CPT | Performed by: INTERNAL MEDICINE

## 2020-07-20 PROCEDURE — 85610 PROTHROMBIN TIME: CPT | Performed by: FAMILY MEDICINE

## 2020-07-20 PROCEDURE — 85025 COMPLETE CBC W/AUTO DIFF WBC: CPT | Performed by: INTERNAL MEDICINE

## 2020-07-20 PROCEDURE — 82784 ASSAY IGA/IGD/IGG/IGM EACH: CPT | Performed by: INTERNAL MEDICINE

## 2020-07-20 PROCEDURE — 36415 COLL VENOUS BLD VENIPUNCTURE: CPT | Performed by: INTERNAL MEDICINE

## 2020-07-20 PROCEDURE — 00000402 ZZHCL STATISTIC TOTAL PROTEIN: Performed by: INTERNAL MEDICINE

## 2020-07-20 NOTE — TELEPHONE ENCOUNTER
ANTICOAGULATION MANAGEMENT     Patient Name:  Haresh Rock  Date:  2020    ASSESSMENT /SUBJECTIVE:    Today's INR result of 2.7 is therapeutic. Goal INR of 2.0-3.0      Warfarin dose taken: Warfarin taken as previously instructed    Diet: No new diet changes affecting INR    Medication changes/ interactions: No new medications/supplements affecting INR    Previous INR: Therapeutic     S/S of bleeding or thromboembolism: No    New injury or illness: No    Upcoming surgery, procedure or cardioversion: No    Additional findings: None      PLAN:    Spoke with Haresh regarding INR result and instructed:     Warfarin Dosing Instructions: Continue your current warfarin dose 1.25mg M/F, 2.5mg ROW    Instructed patient to follow up no later than: 4 days  Patient to recheck with home meter.  Patient normally home tests, had other labs drawn today & INR was added to labs.    Education provided: None required and Written instructions provided      Haresh verbalizes understanding and agrees to warfarin dosing plan.    Instructed to call the Anticoagulation Clinic for any changes, questions or concerns. (#[phone])        Xiomara Tello [facility]      OBJECTIVE:  Recent labs: (last 7 days)     20  1721   INR 3.0* 2.70*             Anticoagulation Summary  As of 2020    INR goal:   2.0-3.0   TTR:   90.7 % (1 y)   INR used for dosin.70 (2020)   Warfarin maintenance plan:   1.25 mg (2.5 mg x 0.5) every Mon, Fri; 2.5 mg (2.5 mg x 1) all other days   Full warfarin instructions:   1.25 mg every Mon, Fri; 2.5 mg all other days   Weekly warfarin total:   15 mg   Plan last modified:   Laverne Ferguson RN (2018)   Next INR check:      Priority:   Maintenance   Target end date:   Indefinite    Indications    Long-term (current) use of anticoagulants [Z79.01] [Z79.01]  Atrial fibrillation (H) [I48.91]             Anticoagulation Episode Summary     INR check location:       Preferred lab:    EXTERNAL LAB    Send INR reminders to:   CHELSEA CHILEL    Comments:   JESSICA okay to manage by exception      Anticoagulation Care Providers     Provider Role Specialty Phone number    Anya Nowak MD [facility] [phone]

## 2020-07-21 ENCOUNTER — TELEPHONE (OUTPATIENT)
Dept: OTOLARYNGOLOGY | Facility: CLINIC | Age: 70
End: 2020-07-21

## 2020-07-21 ENCOUNTER — VIRTUAL VISIT (OUTPATIENT)
Dept: TRANSPLANT | Facility: CLINIC | Age: 70
End: 2020-07-21
Attending: INTERNAL MEDICINE
Payer: COMMERCIAL

## 2020-07-21 DIAGNOSIS — E83.110 HEREDITARY HEMOCHROMATOSIS (H): Primary | ICD-10-CM

## 2020-07-21 DIAGNOSIS — C81.99 HODGKIN'S DISEASE OF EXTRANODAL OR SOLID ORGAN SITE (H): ICD-10-CM

## 2020-07-21 LAB
ALBUMIN SERPL-MCNC: 3.7 G/DL (ref 3.4–5)
ALP SERPL-CCNC: 113 U/L (ref 40–150)
ALT SERPL W P-5'-P-CCNC: 17 U/L (ref 0–70)
ANION GAP SERPL CALCULATED.3IONS-SCNC: 5 MMOL/L (ref 3–14)
AST SERPL W P-5'-P-CCNC: 24 U/L (ref 0–45)
BILIRUB SERPL-MCNC: 0.8 MG/DL (ref 0.2–1.3)
BUN SERPL-MCNC: 23 MG/DL (ref 7–30)
CALCIUM SERPL-MCNC: 8.8 MG/DL (ref 8.5–10.1)
CHLORIDE SERPL-SCNC: 105 MMOL/L (ref 94–109)
CO2 SERPL-SCNC: 27 MMOL/L (ref 20–32)
CREAT SERPL-MCNC: 1.26 MG/DL (ref 0.66–1.25)
CREAT UR-MCNC: 305 MG/DL
FERRITIN SERPL-MCNC: 75 NG/ML (ref 26–388)
GFR SERPL CREATININE-BSD FRML MDRD: 57 ML/MIN/{1.73_M2}
GLUCOSE SERPL-MCNC: 107 MG/DL (ref 70–99)
IRON SATN MFR SERPL: 50 % (ref 15–46)
IRON SERPL-MCNC: 123 UG/DL (ref 35–180)
LDH SERPL L TO P-CCNC: 208 U/L (ref 85–227)
MICROALBUMIN UR-MCNC: 87 MG/L
MICROALBUMIN/CREAT UR: 28.39 MG/G CR (ref 0–17)
POTASSIUM SERPL-SCNC: 4.3 MMOL/L (ref 3.4–5.3)
PROT SERPL-MCNC: 7.3 G/DL (ref 6.8–8.8)
SODIUM SERPL-SCNC: 137 MMOL/L (ref 133–144)
TIBC SERPL-MCNC: 246 UG/DL (ref 240–430)

## 2020-07-21 PROCEDURE — 40001009 ZZH VIDEO/TELEPHONE VISIT; NO CHARGE

## 2020-07-21 NOTE — LETTER
"    7/21/2020         RE: Haresh Rock  [address] [place] [address]        Dear Colleague,    Thank you for referring your patient, Haresh Rock, to the [facility] BLOOD AND MARROW TRANSPLANT. Please see a copy of my visit note below.    Haresh Rock is a 70 year old male who is being evaluated via a billable video visit.        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 No cardiac issue no arrhthymias since on flecanide no chest pain.He had a phlebotomy in May 2019 for elevated ferritin Still on warfarin 2.5mg and 1.25 mg on Monday and Fridays No bleeding. He has been doing home INR tests.They seem to correlate with the Lab fairly Feels good  No fever or chills No COVID sx  He has  Been mowing the lawn. He has lost 20lb s in last 6 months He has had some issues with swallowing with cookie crumbs get stuck causing coughs. No trouble swallowing pills  PAST MEDICAL HISTORY:  See my note from 02/2020    SOCIAL HISTORY:  See my note from 02/2020     FAMILY HISTORY:  See my note from  02/2020     REVIEW OF SYSTEMS:  A 12-point review of systems is done and is negative, except as in the HPI.   PHYSICAL EXAMINATION:  By the video, he is an alert gentleman, verbal, in no acute distress.     EYES:  Grossly normal to inspection, no discharge, erythema or icterus.   SKIN:  Visible skin is " clear.  No significant rash or abnormal pigmentation.   NEUROLOGIC:  Cranial nerves seem to be intact.  Mentation and speech is appropriate for age.   PSYCHIATRIC:  Mentation appears normal.  He does not seem anxious today.   Results for PAMELA GRANADOS (MRN [id number]) as of 7/21/2020 14:41   Ref. Range 7/10/2020 00:00 7/17/2020 00:00 7/20/2020 17:14 7/20/2020 17:21 7/20/2020 17:45   Sodium Latest Ref Range: 133 - 144 mmol/L   137     Potassium Latest Ref Range: 3.4 - 5.3 mmol/L   4.3     Chloride Latest Ref Range: 94 - 109 mmol/L   105     Carbon Dioxide Latest Ref Range: 20 - 32 mmol/L   27     Urea Nitrogen Latest Ref Range: 7 - 30 mg/dL   23     Creatinine Latest Ref Range: 0.66 - 1.25 mg/dL   1.26 (H)     GFR Estimate Latest Ref Range: >60 mL/min/1.73_m2   57 (L)     GFR Estimate If Black Latest Ref Range: >60 mL/min/1.73_m2   66     Calcium Latest Ref Range: 8.5 - 10.1 mg/dL   8.8     Anion Gap Latest Ref Range: 3 - 14 mmol/L   5     Albumin Latest Ref Range: 3.4 - 5.0 g/dL   3.7     Protein Total Latest Ref Range: 6.8 - 8.8 g/dL   7.3     Bilirubin Total Latest Ref Range: 0.2 - 1.3 mg/dL   0.8     Alkaline Phosphatase Latest Ref Range: 40 - 150 U/L   113     ALT Latest Ref Range: 0 - 70 U/L   17     AST Latest Ref Range: 0 - 45 U/L   24     Hemoglobin A1C Latest Ref Range: 0 - 5.6 %    6.3 (H)    Albumin Urine mg/g Cr Latest Ref Range: 0 - 17 mg/g Cr     28.39 (H)   Albumin Urine mg/L Latest Units: mg/L     87   Creatinine Urine Latest Units: mg/dL     305   Ferritin Latest Ref Range: 26 - 388 ng/mL   75     Iron Latest Ref Range: 35 - 180 ug/dL   123     Iron Binding Cap Latest Ref Range: 240 - 430 ug/dL   246     Iron Saturation Index Latest Ref Range: 15 - 46 %   50 (H)     Glucose Latest Ref Range: 70 - 99 mg/dL   107 (H)     WBC Latest Ref Range: 4.0 - 11.0 10e9/L   7.7     Hemoglobin Latest Ref Range: 13.3 - 17.7 g/dL   16.5     Hematocrit Latest Ref Range: 40.0 - 53.0 %   45.8     Platelet Count  Latest Ref Range: 150 - 450 10e9/L   118 (L)     RBC Count Latest Ref Range: 4.4 - 5.9 10e12/L   5.11     MCV Latest Ref Range: 78 - 100 fl   90     MCH Latest Ref Range: 26.5 - 33.0 pg   32.3     MCHC Latest Ref Range: 31.5 - 36.5 g/dL   36.0     RDW Latest Ref Range: 10.0 - 15.0 %   12.4     Diff Method Unknown   Automated Method     % Neutrophils Latest Units: %   52.3     % Lymphocytes Latest Units: %   36.8     % Monocytes Latest Units: %   9.3     % Eosinophils Latest Units: %   1.2     % Basophils Latest Units: %   0.4     Absolute Neutrophil Latest Ref Range: 1.6 - 8.3 10e9/L   4.0     Absolute Lymphocytes Latest Ref Range: 0.8 - 5.3 10e9/L   2.8     Absolute Monocytes Latest Ref Range: 0.0 - 1.3 10e9/L   0.7     Absolute Eosinophils Latest Ref Range: 0.0 - 0.7 10e9/L   0.1     Absolute Basophils Latest Ref Range: 0.0 - 0.2 10e9/L   0.0     INR Latest Ref Range: 0.86 - 1.14  2.6 (A) 3.0 (A)  2.70 (H)    Albumin Fraction Latest Ref Range: 3.7 - 5.1 g/dL   PENDING     Alpha 1 Fraction Latest Ref Range: 0.2 - 0.4 g/dL   PENDING     Alpha 2 Fraction Latest Ref Range: 0.5 - 0.9 g/dL   PENDING     Beta Fraction Latest Ref Range: 0.6 - 1.0 g/dL   PENDING     ELP Interpretation: Unknown   PENDING     Gamma Fraction Latest Ref Range: 0.7 - 1.6 g/dL   PENDING     Monoclonal Peak Latest Ref Range: 0.0 g/dL   PENDING         ASSESSMENT:     1.  Myeloproliferative syndrome/MDS. JAL 2 positive  2.  Status post non-myeloablative allo-sib peripheral blood stem cell transplant.   3.  History of chronic hmnnq-hwkizp-wxrz disease.   4.  History of Hodgkin's disease.   5.  History of cardiac arrhythmias, SVT and V tach.   6.  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   7.  History of cirrhosis.   8.  History of pulmonary emboli.   9.  History of elevated hemoglobin A1c.   10.  Thyroid nodule.    11.  Cholelithiasis.     12.  Diverticulosis.   13.  Anticoagulation.   14.  Status post  bilateral knee replacement.     15.  Aortic stenosis  16. 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  17. Seborrheic keratosis of the back.  18 Weight loss  19.Dysphagia     Haresh is doing well He had a high hemoglobin and JAK2 was negative in February. The Hodgkins remains in CR .Ferrtin was 75 and  Iron st 50% No need for phlebotomies.Hgb A1 C was high at 6.3 Not sure about the weight loss. Wonder if it is the early diabetes. May need to repeat scans to r/o Hodgkins or possibly cirrhosis.  Worry about dyphagia Will get Speech therapy consult and possible barium swallow    ENT and Speech Therapy referrals  RTC in 6 months with labs      Augusto Miller MD   [phone]    Video-Visit Details    Type of service:  Video Visit    Video Start Time: 9:55  Video End Time:  10:20    Originating Location (pt. Location): [facility] Home [facility] [place]    Distant Location (provider location):  [facility] BLOOD AND MARROW TRANSPLANT     Platform used for Video Visit: [facility]    Again, thank you for allowing me to participate in the care of your patient.        Sincerely,        Augusto Miller MD

## 2020-07-21 NOTE — PROGRESS NOTES
"Haresh Rock is a 70 year old male who is being evaluated via a billable video visit.      The patient has been notified of following:     \"This video visit will be conducted via a call between you and your physician/provider. We have found that certain health care needs can be provided without the need for an in-person physical exam.  This service lets us provide the care you need with a video conversation.  If a prescription is necessary we can send it directly to your pharmacy.  If lab work is needed we can place an order for that and you can then stop by our lab to have the test done at a later time.    Video visits are billed at different rates depending on your insurance coverage.  Please reach out to your insurance provider with any questions.    If during the course of the call the physician/provider feels a video visit is not appropriate, you will not be charged for this service.\"    Patient has given verbal consent for Video visit? Yes    How would you like to obtain your AVS? 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 No cardiac issue no arrhthymias since on flecanide no chest pain.He had a phlebotomy in May 2019 for elevated ferritin Still on warfarin 2.5mg and 1.25 mg on Monday and Fridays No bleeding. He has been doing home INR tests.They seem to correlate with the Lab " fairly Feels good  No fever or chills No COVID sx  He has  Been mowing the lawn. He has lost 20lb s in last 6 months He has had some issues with swallowing with cookie crumbs get stuck causing coughs. No trouble swallowing pills  PAST MEDICAL HISTORY:  See my note from 02/2020    SOCIAL HISTORY:  See my note from 02/2020     FAMILY HISTORY:  See my note from  02/2020     REVIEW OF SYSTEMS:  A 12-point review of systems is done and is negative, except as in the HPI.   PHYSICAL EXAMINATION:  By the video, he is an alert gentleman, verbal, in no acute distress.     EYES:  Grossly normal to inspection, no discharge, erythema or icterus.   SKIN:  Visible skin is clear.  No significant rash or abnormal pigmentation.   NEUROLOGIC:  Cranial nerves seem to be intact.  Mentation and speech is appropriate for age.   PSYCHIATRIC:  Mentation appears normal.  He does not seem anxious today.   Results for PAMELA GRANADOS (MRN [id number]) as of 7/21/2020 14:41   Ref. Range 7/10/2020 00:00 7/17/2020 00:00 7/20/2020 17:14 7/20/2020 17:21 7/20/2020 17:45   Sodium Latest Ref Range: 133 - 144 mmol/L   137     Potassium Latest Ref Range: 3.4 - 5.3 mmol/L   4.3     Chloride Latest Ref Range: 94 - 109 mmol/L   105     Carbon Dioxide Latest Ref Range: 20 - 32 mmol/L   27     Urea Nitrogen Latest Ref Range: 7 - 30 mg/dL   23     Creatinine Latest Ref Range: 0.66 - 1.25 mg/dL   1.26 (H)     GFR Estimate Latest Ref Range: >60 mL/min/1.73_m2   57 (L)     GFR Estimate If Black Latest Ref Range: >60 mL/min/1.73_m2   66     Calcium Latest Ref Range: 8.5 - 10.1 mg/dL   8.8     Anion Gap Latest Ref Range: 3 - 14 mmol/L   5     Albumin Latest Ref Range: 3.4 - 5.0 g/dL   3.7     Protein Total Latest Ref Range: 6.8 - 8.8 g/dL   7.3     Bilirubin Total Latest Ref Range: 0.2 - 1.3 mg/dL   0.8     Alkaline Phosphatase Latest Ref Range: 40 - 150 U/L   113     ALT Latest Ref Range: 0 - 70 U/L   17     AST Latest Ref Range: 0 - 45 U/L   24     Hemoglobin A1C  Latest Ref Range: 0 - 5.6 %    6.3 (H)    Albumin Urine mg/g Cr Latest Ref Range: 0 - 17 mg/g Cr     28.39 (H)   Albumin Urine mg/L Latest Units: mg/L     87   Creatinine Urine Latest Units: mg/dL     305   Ferritin Latest Ref Range: 26 - 388 ng/mL   75     Iron Latest Ref Range: 35 - 180 ug/dL   123     Iron Binding Cap Latest Ref Range: 240 - 430 ug/dL   246     Iron Saturation Index Latest Ref Range: 15 - 46 %   50 (H)     Glucose Latest Ref Range: 70 - 99 mg/dL   107 (H)     WBC Latest Ref Range: 4.0 - 11.0 10e9/L   7.7     Hemoglobin Latest Ref Range: 13.3 - 17.7 g/dL   16.5     Hematocrit Latest Ref Range: 40.0 - 53.0 %   45.8     Platelet Count Latest Ref Range: 150 - 450 10e9/L   118 (L)     RBC Count Latest Ref Range: 4.4 - 5.9 10e12/L   5.11     MCV Latest Ref Range: 78 - 100 fl   90     MCH Latest Ref Range: 26.5 - 33.0 pg   32.3     MCHC Latest Ref Range: 31.5 - 36.5 g/dL   36.0     RDW Latest Ref Range: 10.0 - 15.0 %   12.4     Diff Method Unknown   Automated Method     % Neutrophils Latest Units: %   52.3     % Lymphocytes Latest Units: %   36.8     % Monocytes Latest Units: %   9.3     % Eosinophils Latest Units: %   1.2     % Basophils Latest Units: %   0.4     Absolute Neutrophil Latest Ref Range: 1.6 - 8.3 10e9/L   4.0     Absolute Lymphocytes Latest Ref Range: 0.8 - 5.3 10e9/L   2.8     Absolute Monocytes Latest Ref Range: 0.0 - 1.3 10e9/L   0.7     Absolute Eosinophils Latest Ref Range: 0.0 - 0.7 10e9/L   0.1     Absolute Basophils Latest Ref Range: 0.0 - 0.2 10e9/L   0.0     INR Latest Ref Range: 0.86 - 1.14  2.6 (A) 3.0 (A)  2.70 (H)    Albumin Fraction Latest Ref Range: 3.7 - 5.1 g/dL   PENDING     Alpha 1 Fraction Latest Ref Range: 0.2 - 0.4 g/dL   PENDING     Alpha 2 Fraction Latest Ref Range: 0.5 - 0.9 g/dL   PENDING     Beta Fraction Latest Ref Range: 0.6 - 1.0 g/dL   PENDING     ELP Interpretation: Unknown   PENDING     Gamma Fraction Latest Ref Range: 0.7 - 1.6 g/dL   PENDING      Monoclonal Peak Latest Ref Range: 0.0 g/dL   PENDING         ASSESSMENT:     1.  Myeloproliferative syndrome/MDS. JAL 2 positive  2.  Status post non-myeloablative allo-sib peripheral blood stem cell transplant.   3.  History of chronic sjlyj-gixvfl-xptr disease.   4.  History of Hodgkin's disease.   5.  History of cardiac arrhythmias, SVT and V tach.   6.  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   7.  History of cirrhosis.   8.  History of pulmonary emboli.   9.  History of elevated hemoglobin A1c.   10.  Thyroid nodule.    11.  Cholelithiasis.     12.  Diverticulosis.   13.  Anticoagulation.   14.  Status post bilateral knee replacement.     15.  Aortic stenosis  16. 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  17. Seborrheic keratosis of the back.  18 Weight loss  19.Dysphagia     Haresh is doing well He had a high hemoglobin and JAK2 was negative in February. The Hodgkins remains in CR .Ferrtin was 75 and  Iron st 50% No need for phlebotomies.Hgb A1 C was high at 6.3 Not sure about the weight loss. Wonder if it is the early diabetes. May need to repeat scans to r/o Hodgkins or possibly cirrhosis.  Worry about dyphagia Will get Speech therapy consult and possible barium swallow    ENT and Speech Therapy referrals  RTC in 6 months with labs      Augusto Miller MD   [phone]    Video-Visit Details    Type of service:  Video Visit    Video Start Time: 9:55  Video End Time:  10:20    Originating Location (pt. Location): [facility]    Distant Location (provider location):  [facility] BLOOD AND MARROW TRANSPLANT     Platform used for Video Visit: [employer]

## 2020-07-21 NOTE — TELEPHONE ENCOUNTER
"Patient to schedule, per Cheyenne CHEN RN:    Appointment Type - Video Visit New / Telephone Visit New  Provider - Dr. Colon or Dr Mitchell  Appointment Notes - Hoarseness and Dysphagia, referred by Dr. Miller ([facility])    LVM with scheduling instructions and ENT call center number. Patient is welcome to schedule in next available \"NEW\" slot of Dr Mitchell or Dr. Colon. VIDEO or PHONE visit.  "

## 2020-07-22 LAB
ALBUMIN SERPL ELPH-MCNC: 4 G/DL (ref 3.7–5.1)
ALPHA1 GLOB SERPL ELPH-MCNC: 0.3 G/DL (ref 0.2–0.4)
ALPHA2 GLOB SERPL ELPH-MCNC: 0.7 G/DL (ref 0.5–0.9)
B-GLOBULIN SERPL ELPH-MCNC: 0.8 G/DL (ref 0.6–1)
GAMMA GLOB SERPL ELPH-MCNC: 1.2 G/DL (ref 0.7–1.6)
IGG SERPL-MCNC: 1095 MG/DL (ref 610–1616)
M PROTEIN SERPL ELPH-MCNC: 0.1 G/DL
PROT PATTERN SERPL ELPH-IMP: ABNORMAL

## 2020-07-22 NOTE — TELEPHONE ENCOUNTER
FUTURE VISIT INFORMATION      FUTURE VISIT INFORMATION:    Date: 8/31/2020    Time: 9AM    Location: [facility] [place]  REFERRAL INFORMATION:    Referring provider:  Augusto Miller MD     Referring providers clinic:  [facility]     Reason for visit/diagnosis  Hereditary hemochromatosis, Hodgkin's disease of extranodal or solid organ site / Hoarseness Per Pt    RECORDS REQUESTED FROM:       Clinic name Comments Records Status Imaging Status   [facility]  7/21/2020 referral and note from Augusto Miller MD  [facility]     [facility]  7/23/2020 scheduled consult with Jesusita Chowdhury SLP  [facility]    Imaging 11/21/17 CT Chest and CT Neck  [facility] PACS

## 2020-07-23 ENCOUNTER — HOSPITAL ENCOUNTER (OUTPATIENT)
Dept: SPEECH THERAPY | Facility: CLINIC | Age: 70
Setting detail: THERAPIES SERIES
End: 2020-07-23
Attending: INTERNAL MEDICINE
Payer: COMMERCIAL

## 2020-07-23 DIAGNOSIS — C81.99 HODGKIN'S DISEASE OF EXTRANODAL OR SOLID ORGAN SITE (H): ICD-10-CM

## 2020-07-23 DIAGNOSIS — E83.110 HEREDITARY HEMOCHROMATOSIS (H): ICD-10-CM

## 2020-07-23 PROCEDURE — 92526 ORAL FUNCTION THERAPY: CPT | Mod: GN | Performed by: SPEECH-LANGUAGE PATHOLOGIST

## 2020-07-23 PROCEDURE — 92610 EVALUATE SWALLOWING FUNCTION: CPT | Mod: GN | Performed by: SPEECH-LANGUAGE PATHOLOGIST

## 2020-07-24 LAB — INR PPP: 2.8 (ref 0.9–1.1)

## 2020-07-27 ENCOUNTER — DOCUMENTATION ONLY (OUTPATIENT)
Dept: FAMILY MEDICINE | Facility: CLINIC | Age: 70
End: 2020-07-27

## 2020-07-27 ENCOUNTER — ANTICOAGULATION THERAPY VISIT (OUTPATIENT)
Dept: FAMILY MEDICINE | Facility: CLINIC | Age: 70
End: 2020-07-27

## 2020-07-27 DIAGNOSIS — Z79.01 LONG TERM CURRENT USE OF ANTICOAGULANT THERAPY: ICD-10-CM

## 2020-07-27 DIAGNOSIS — D68.61 ANTIPHOSPHOLIPID ANTIBODY SYNDROME (H): ICD-10-CM

## 2020-07-27 DIAGNOSIS — I48.91 ATRIAL FIBRILLATION, UNSPECIFIED TYPE (H): Primary | ICD-10-CM

## 2020-07-27 DIAGNOSIS — I48.91 ATRIAL FIBRILLATION (H): ICD-10-CM

## 2020-07-27 DIAGNOSIS — Z86.718 PERSONAL HISTORY OF DVT (DEEP VEIN THROMBOSIS): ICD-10-CM

## 2020-07-27 LAB — TSH SERPL DL<=0.005 MIU/L-ACNC: 2.5 MU/L (ref 0.4–4)

## 2020-07-27 NOTE — PROGRESS NOTES
ANTICOAGULATION MANAGEMENT      Haresh Rock due for annual renewal of referral to anticoagulation monitoring. Order pended for your review and signature.      ANTICOAGULATION SUMMARY      Warfarin indication(s)     Atrial fibrillation    Heart valve present?  NO       Current goal range   INR: 2.0-3.0     Goal appropriate for indication? Yes, INR 2-3 appropriate for hx of DVT, PE, hypercoagulable state, Afib, LVAD, or bileaflet AVR without risk factors     Current duration of therapy Indefinite/long term therapy   Time in Therapeutic Range (TTR)  (Goal > 60%) 91 %       Office visit with referring provider's group within last year yes on 7/6/20       Nina Ventura RN

## 2020-07-27 NOTE — PROGRESS NOTES
ANTICOAGULATION  MANAGEMENT-Patient Home Monitoring Result    Assessment     Therapeutic INR result of 2.8 . Goal range 2.0-3.0. Received via fax from [employer] home INR monitoring company.        Previous INR was therapeutic    Haresh was last contacted by phone: 20    Plan     Per home monitoring agreement with patient, patient was NOT contacted regarding therapeutic result today.  Patient is to continue current dose and continue to check INR with home monitor per protocol.  ?       OBJECTIVE    INR   Date Value Ref Range Status   2020 2.8 (A) 0.90 - 1.10 Final     Factor 2 Assay   Date Value Ref Range Status   2007 58 (L) 60 - 140 % Final     Comment:     (Note)  CALLED TO TAMARA(INTERV. RADIOLOGY)[id number],        ASSESSMENT / PLAN  No question data found.  Anticoagulation Summary  As of 2020    INR goal:   2.0-3.0   TTR:   90.8 % (1 y)   INR used for dosin.8 (2020)   Warfarin maintenance plan:   1.25 mg (2.5 mg x 0.5) every Mon, Fri; 2.5 mg (2.5 mg x 1) all other days   Full warfarin instructions:   1.25 mg every Mon, Fri; 2.5 mg all other days   Weekly warfarin total:   15 mg   No change documented:   Macrina Preston, RN   Plan last modified:   Laverne Ferguson RN (2018)   Next INR check:   2020   Priority:   Maintenance   Target end date:   Indefinite    Indications    Long-term (current) use of anticoagulants [Z79.01] [Z79.01]  Atrial fibrillation (H) [I48.91]             Anticoagulation Episode Summary     INR check location:       Preferred lab:   EXTERNAL LAB    Send INR reminders to:   CHELSEA CHILEL    Comments:   JESSICA rodas to manage by exception      Anticoagulation Care Providers     Provider Role Specialty Phone number    Anya Nowak MD [facility] [phone]          katie

## 2020-07-27 NOTE — TELEPHONE ENCOUNTER
Duplicate. 1st request.    warfarin ANTICOAGULANT (COUMADIN) 2.5 MG tablet  72 tablet  1  7/6/2020   No    Sig: Take 1.25 mg (1/2 tablet) Mon/Fri and 2.5 mg (1 tablet) all other days    Sent to pharmacy as: Warfarin Sodium 2.5 MG Oral Tablet (COUMADIN)    Class: E-Prescribe    Order: [id number]    E-Prescribing Status: Receipt confirmed by pharmacy (7/6/2020 10:15 AM CDT)      Mayi WELCH CMA ([facility])

## 2020-07-27 NOTE — RESULT ENCOUNTER NOTE
Haresh,    Your thyroid level is normal. Your average blood glucose is well controlled. You have a small of protein in your urine, which in some cases represents early effects of blood glucose or high blood pressure on the kidneys.       Anya Nowak MD

## 2020-07-27 NOTE — PROGRESS NOTES
"Adult Outpatient Dysphagia Evaluation   07/23/20 1000       Present No   General Information   Type Of Visit Initial   Start Of Care Date 07/23/20   Referring Physician Augusto Miller MD   Orders Evaluate And Treat   Orders Comment Dysphagia   Medical Diagnosis Dysphagia   Onset Of Illness/injury Or Date Of Surgery 07/21/20  (Order date. Dysphagia present for 6-12 months.)   Precautions/limitations No Known Precautions/limitations   Hearing WFL   Pertinent History of Current Problem/OT: Additional Occupational Profile Info Haresh was unaccompanied to this evaluation. He reports that at a follow up for his bone marrow transplant, he mentioned to his doctor he has had some trouble swallowing for the last 6 months - year. He describes this as a feeling of something caught in his throat and usually occurs with solid foods that have a \"crumbly\" consistency. He denies any difficulty with liquids and in fact tends to use these to his benefit when he is having a sensation of crumbs in his throat. Haresh has also observed that his voice becomes weak for a period of time after an episode of this swallowing difficulty occurs. Haresh used to eat in his [place]. However, since sitting up and leaning slightly forward, he has had less trouble.   Respiratory Status Room air   Prior Level Of Function Swallowing   Prior Level Of Function Comment No difficulty before noticing trouble start 6 months to 1 year ago.   Patient Role/employment History Retired  (Previously worked in computer science.)   Living Environment [place]  (With his wife, Angelica.)   Patient/family Goals To be able to eat any consistency without difficulty.   Fall Risk Screen   Fall screen completed by SLP   Have you fallen 2 or more times in the past year? No   Have you fallen and had an injury in the past year? No   Is patient a fall risk? No   Clinical Swallow Evaluation   Oral Musculature generally intact   Structural Abnormalities " "none present   Dentition present and adequate   Mucosal Quality dry  (Haresh reports intermittent dry mouth sensation.)   Mandibular Strength and Mobility intact   Oral Labial Strength and Mobility WFL   Lingual Strength and Mobility WFL   Velar Elevation intact   Buccal Strength and Mobility intact   Laryngeal Function Cough;Throat clear;Swallow;Voicing initiated;Dry swallow palpated  (All present and adequate.)   Oral Musculature Comments No structural concerns at this time.   Additional Documentation Yes   Additional evaluation(s) completed today Yes   Rationale for completing additional evaluation Patient report of globus sensation after foods with a \"crumbly\" consistency.   Clinical Swallow Eval: Thin Liquid Texture Trial   Mode of Presentation, Thin Liquids cup;self-fed   Volume of Liquid or Food Presented 8 oz water   Oral Phase of Swallow WFL   Pharyngeal Phase of Swallow throat clearing   Diagnostic Statement Although Haresh reports no difficulty with liquid consistencies. It was noted that in 2/5 liquid trials or liquid washes during PO trials, a delayed throat clear was observed. It is unclear if this was related to the water or possibly the globus sensation Haresh has reported.   Clinical Swallow Eval: Solid Food Texture Trial   Mode of Presentation, Solid self-fed   Volume of Solid Food Presented 1 Kera Doone Shortbread Cookie   Oral Phase, Solid WFL   Pharyngeal Phase, Solid feeling of something stuck in throat   Successful Strategies Trialed During Procedure, Solid other (see comments)  (Liquid was with water.)   Diagnostic Statement Haresh reported the sensation of crumbs stuck in his throat after 2/3 trials of cookie consistency. He independently initiated a liquid wash in both of these cases which he reported improved the sensation. After his 3rd trial of cookie consistency, Haresh was noted with a weakened voice quality which did not improve with throat clears.   VFSS Evaluation   VFSS Additional " Documentation No   FEES Evaluation   Additional Documentation No   Swallow Compensations   Swallow Compensations   (Alternate solids and liquids.)   Results Other (see comments)  (Decreased globus sensation.)   Educational Assessment   Barriers to Learning No barriers   Preferred Learning Style Reading  (To provide material for later recall.)   Esophageal Phase of Swallow   Patient reports or presents with symptoms of esophageal dysphagia No   General Therapy Interventions   Planned Therapy Interventions Dysphagia Treatment   Dysphagia treatment Oropharyngeal exercise training   Swallow Eval: Clinical Impressions   Skilled Criteria for Therapy Intervention Skilled criteria met.  Treatment indicated.   Functional Assessment Scale (FAS) 5   Dysphagia Outcome Severity Scale (KATHRYN) Level 5 - KATHRYN   Treatment Diagnosis Mild dysphagia characterized by globus sensation following solid consistencies.   Diet texture recommendations Regular diet;Thin liquids   Recommended Feeding/Eating Techniques alternate between small bites and sips of food/liquid;maintain upright posture during/after eating for 30 mins   Rehab Potential good, to achieve stated therapy goals   Therapy Frequency other (see comments)  (Follow up by phone in 2-3 weeks. Determine additional needs.)   Predicted Duration of Therapy Intervention (days/wks) 1 month   Anticipated Discharge Disposition home   Risks and Benefits of Treatment have been explained. Yes   Patient, family and/or staff in agreement with Plan of Care Yes   Clinical Impression Comments Haresh presents with mild dysphagia characterized by globus sensation which appears to be improved by liquid was with thin liquids. PO trials of thin liquids and solid consistencies did not result in any signs or symptoms of aspiration making Haresh's swallow appear to be safe. He would benefit from training in oropharyngeal exercises to reduce residue causing globus sensation for improved function of swallow.    Swallow Goals   SLP Swallow Goals 1;2   Swallow Goal 1   Goal Identifier exercises   Goal Description Haresh will report daily completion of oropharyngeal exercises at least 6/7 days/week including effortful swallow, Mendelsohn Maneuver and tongue hold exercises provided written descriptions for improved strength and safety of swallow.   Target Date 10/23/20   Swallow Goal 2   Goal Identifier patient report   Goal Description Haresh will report a reduction in globus sensation episodes of at least 50% indicating an improved safety and function of swallow.   Target Date 10/23/20   Total Session Time   SLP Eval: oral/pharyngeal swallow function, clinical minutes (58983) 25   Total Evaluation Time 25     Jesusita Chowdhury MA, [facility]-SLP  [facility]  [phone] (Phone)  [phone] (Fax)

## 2020-07-28 RX ORDER — WARFARIN SODIUM 2.5 MG/1
TABLET ORAL
Qty: 90 TABLET | Refills: 0 | Status: SHIPPED | OUTPATIENT
Start: 2020-07-28 | End: 2020-08-03

## 2020-07-28 NOTE — TELEPHONE ENCOUNTER
INR at last check was 2.8 on 07/24/2020  Dose 07/28: 1.25 mg every Mon, Fri; 2.5 mg all other days.  Refill sent.

## 2020-07-30 DIAGNOSIS — D68.61 ANTIPHOSPHOLIPID ANTIBODY SYNDROME (H): ICD-10-CM

## 2020-07-30 NOTE — TELEPHONE ENCOUNTER
duplicate     Disp  Refills  Start  End  KRIS    warfarin ANTICOAGULANT (COUMADIN) 2.5 MG tablet  90 tablet  0  7/28/2020   No    Sig: TAKE 1.25 MG (1/2 TABLET) MON/FRI AND 2.5 MG (1 TABLET) ALL OTHER DAYS. NEED APPT.    Sent to pharmacy as: Warfarin Sodium 2.5 MG Oral Tablet (COUMADIN)    Class: E-Prescribe    Order: [id number]    E-Prescribing Status: Receipt confirmed by pharmacy (7/28/2020 10:12 AM CDT)

## 2020-08-01 ENCOUNTER — TRANSFERRED RECORDS (OUTPATIENT)
Dept: HEALTH INFORMATION MANAGEMENT | Facility: CLINIC | Age: 70
End: 2020-08-01

## 2020-08-01 LAB — INR PPP: 2 (ref 0.9–1.1)

## 2020-08-03 ENCOUNTER — DOCUMENTATION ONLY (OUTPATIENT)
Dept: FAMILY MEDICINE | Facility: CLINIC | Age: 70
End: 2020-08-03

## 2020-08-03 DIAGNOSIS — D68.61 ANTIPHOSPHOLIPID ANTIBODY SYNDROME (H): ICD-10-CM

## 2020-08-03 DIAGNOSIS — Z79.01 LONG TERM CURRENT USE OF ANTICOAGULANT THERAPY: ICD-10-CM

## 2020-08-03 DIAGNOSIS — I48.91 ATRIAL FIBRILLATION, UNSPECIFIED TYPE (H): ICD-10-CM

## 2020-08-03 DIAGNOSIS — Z86.718 PERSONAL HISTORY OF DVT (DEEP VEIN THROMBOSIS): ICD-10-CM

## 2020-08-03 RX ORDER — WARFARIN SODIUM 2.5 MG/1
TABLET ORAL
Qty: 72 TABLET | Refills: 1 | Status: SHIPPED | OUTPATIENT
Start: 2020-08-03 | End: 2020-10-21

## 2020-08-03 NOTE — PROGRESS NOTES
ANTICOAGULATION  MANAGEMENT-Patient Home Monitoring Result    Assessment     Therapeutic INR result of 2.0 . Goal range 2.0-3.0. Received via fax from [employer] home INR monitoring company.        Previous INR was therapeutic    Haresh was last contacted by phone:     Plan     Per home monitoring agreement with patient, patient was NOT contacted regarding therapeutic result today.  Patient is to continue current dose and continue to check INR with home monitor per protocol.  ?       OBJECTIVE    INR   Date Value Ref Range Status   2020 2.0 (A) 0.90 - 1.10 Final     Factor 2 Assay   Date Value Ref Range Status   2007 58 (L) 60 - 140 % Final     Comment:     (Note)  CALLED TO TAMARA(INTERV. RADIOLOGY)[id number],        ASSESSMENT / PLAN  No question data found.  Anticoagulation Summary  As of 8/3/2020    INR goal:   2.0-3.0   TTR:   92.8 % (1 y)   INR used for dosin.0 (2020)   Warfarin maintenance plan:   1.25 mg (2.5 mg x 0.5) every Mon, Fri; 2.5 mg (2.5 mg x 1) all other days   Full warfarin instructions:   1.25 mg every Mon, Fri; 2.5 mg all other days   Weekly warfarin total:   15 mg   Plan last modified:   Laverne Ferguson RN (2018)   Next INR check:   2020   Priority:   Maintenance   Target end date:   Indefinite    Indications    Long-term (current) use of anticoagulants [Z79.01] [Z79.01]  Atrial fibrillation (H) [I48.91]  Antiphospholipid antibody syndrome (H) [D68.61]  Personal history of DVT (deep vein thrombosis) [Z86.718]  Atrial fibrillation  unspecified type (H) [I48.91]             Anticoagulation Episode Summary     INR check location:       Preferred lab:   EXTERNAL LAB    Send INR reminders to:   CHELSEA CHILEL    Comments:   JESSICA rodas to manage by exception      Anticoagulation Care Providers     Provider Role Specialty Phone number    Anya Nowak MD Referring [facility] [phone]

## 2020-08-05 DIAGNOSIS — C81.99 HODGKIN'S DISEASE OF EXTRANODAL OR SOLID ORGAN SITE (H): ICD-10-CM

## 2020-08-05 DIAGNOSIS — E83.110 HEREDITARY HEMOCHROMATOSIS (H): ICD-10-CM

## 2020-08-05 LAB
ALBUMIN SERPL-MCNC: 3.7 G/DL (ref 3.4–5)
ALP SERPL-CCNC: 119 U/L (ref 40–150)
ALT SERPL W P-5'-P-CCNC: 19 U/L (ref 0–70)
ANION GAP SERPL CALCULATED.3IONS-SCNC: 4 MMOL/L (ref 3–14)
AST SERPL W P-5'-P-CCNC: 29 U/L (ref 0–45)
BILIRUB SERPL-MCNC: 1.4 MG/DL (ref 0.2–1.3)
BUN SERPL-MCNC: 20 MG/DL (ref 7–30)
CALCIUM SERPL-MCNC: 9.1 MG/DL (ref 8.5–10.1)
CHLORIDE SERPL-SCNC: 102 MMOL/L (ref 94–109)
CO2 SERPL-SCNC: 29 MMOL/L (ref 20–32)
CREAT SERPL-MCNC: 1.46 MG/DL (ref 0.66–1.25)
FERRITIN SERPL-MCNC: 128 NG/ML (ref 26–388)
GFR SERPL CREATININE-BSD FRML MDRD: 47 ML/MIN/{1.73_M2}
GLUCOSE SERPL-MCNC: 134 MG/DL (ref 70–99)
IRON SATN MFR SERPL: 49 % (ref 15–46)
IRON SERPL-MCNC: 122 UG/DL (ref 35–180)
POTASSIUM SERPL-SCNC: 4.4 MMOL/L (ref 3.4–5.3)
PROT SERPL-MCNC: 7.4 G/DL (ref 6.8–8.8)
SODIUM SERPL-SCNC: 135 MMOL/L (ref 133–144)
TIBC SERPL-MCNC: 249 UG/DL (ref 240–430)

## 2020-08-05 PROCEDURE — 82728 ASSAY OF FERRITIN: CPT | Performed by: INTERNAL MEDICINE

## 2020-08-05 PROCEDURE — 83550 IRON BINDING TEST: CPT | Performed by: INTERNAL MEDICINE

## 2020-08-05 PROCEDURE — 80053 COMPREHEN METABOLIC PANEL: CPT | Performed by: INTERNAL MEDICINE

## 2020-08-05 PROCEDURE — 36415 COLL VENOUS BLD VENIPUNCTURE: CPT | Performed by: INTERNAL MEDICINE

## 2020-08-05 PROCEDURE — 83540 ASSAY OF IRON: CPT | Performed by: INTERNAL MEDICINE

## 2020-08-07 ENCOUNTER — MYC MEDICAL ADVICE (OUTPATIENT)
Dept: FAMILY MEDICINE | Facility: CLINIC | Age: 70
End: 2020-08-07

## 2020-08-07 ENCOUNTER — TRANSFERRED RECORDS (OUTPATIENT)
Dept: HEALTH INFORMATION MANAGEMENT | Facility: CLINIC | Age: 70
End: 2020-08-07

## 2020-08-07 DIAGNOSIS — E78.5 HYPERLIPIDEMIA WITH TARGET LDL LESS THAN 100: Primary | ICD-10-CM

## 2020-08-07 LAB — INR PPP: 1.3 (ref 0.9–1.1)

## 2020-08-07 NOTE — TELEPHONE ENCOUNTER
Lipid panel was last done on 4/23/19, was this the lab that was needed for fasting?  Dasha Colmenares RN

## 2020-08-10 ENCOUNTER — ANTICOAGULATION THERAPY VISIT (OUTPATIENT)
Dept: FAMILY MEDICINE | Facility: CLINIC | Age: 70
End: 2020-08-10

## 2020-08-10 DIAGNOSIS — I48.91 ATRIAL FIBRILLATION, UNSPECIFIED TYPE (H): ICD-10-CM

## 2020-08-10 DIAGNOSIS — Z79.01 LONG TERM CURRENT USE OF ANTICOAGULANT THERAPY: ICD-10-CM

## 2020-08-10 DIAGNOSIS — Z86.718 PERSONAL HISTORY OF DVT (DEEP VEIN THROMBOSIS): ICD-10-CM

## 2020-08-10 DIAGNOSIS — D68.61 ANTIPHOSPHOLIPID ANTIBODY SYNDROME (H): ICD-10-CM

## 2020-08-10 DIAGNOSIS — I48.0 PAROXYSMAL ATRIAL FIBRILLATION (H): ICD-10-CM

## 2020-08-10 NOTE — PROGRESS NOTES
ANTICOAGULATION MANAGEMENT     Patient Name:  Haresh Rock  Date:  8/10/2020    ASSESSMENT /SUBJECTIVE:    Today's INR result of 1.3 is subtherapeutic. Goal INR of 2.0-3.0      Warfarin dose taken: Warfarin taken as previously instructed    Diet: No new diet changes affecting INR    Medication changes/ interactions: No new medications/supplements affecting INR    Previous INR: therapeutic     S/S of bleeding or thromboembolism: No    New injury or illness: No    Upcoming surgery, procedure or cardioversion: No    Additional findings: Patient denies any overall changes including new supplements, etc.      PLAN:    Spoke with Haresh regarding INR result and instructed:     Warfarin Dosing Instructions: Boost dose of 2.5 mg today, then resume normal dosing    Instructed patient to follow up no later than: 1 week  Patient to recheck with home meter    Education provided: Monitoring for bleeding signs and symptoms and Monitoring for clotting signs and symptoms      Haresh,  verbalizes understanding and agrees to warfarin dosing plan.    Instructed to call the Anticoagulation Clinic for any changes, questions or concerns. (#[phone])        Allyn Doe RN      OBJECTIVE:  Recent labs: (last 7 days)     20   INR 1.3*         No question data found.  Anticoagulation Summary  As of 8/10/2020    INR goal:   2.0-3.0   TTR:   93.0 % (1 y)   INR used for dosin.3! (2020)   Warfarin maintenance plan:   1.25 mg (2.5 mg x 0.5) every Mon, Fri; 2.5 mg (2.5 mg x 1) all other days   Full warfarin instructions:   8/10: 2.5 mg; Otherwise 1.25 mg every Mon, Fri; 2.5 mg all other days   Weekly warfarin total:   15 mg   Plan last modified:   Laverne Ferguson, RN (2018)   Next INR check:   2020   Priority:   Maintenance   Target end date:   Indefinite    Indications    Long-term (current) use of anticoagulants [Z79.01] [Z79.01]  Atrial fibrillation (H) [I48.91]  Antiphospholipid antibody syndrome (H)  [D68.61]  Personal history of DVT (deep vein thrombosis) [Z86.718]  Atrial fibrillation  unspecified type (H) [I48.91]             Anticoagulation Episode Summary     INR check location:       Preferred lab:   EXTERNAL LAB    Send INR reminders to:   CHELSEA CHILEL    Comments:   JESSICA okay to manage by exception      Anticoagulation Care Providers     Provider Role Specialty Phone number    Anya Nowak MD [facility] [phone]

## 2020-08-12 ENCOUNTER — TELEPHONE (OUTPATIENT)
Dept: SPEECH THERAPY | Facility: CLINIC | Age: 70
End: 2020-08-12

## 2020-08-12 NOTE — TELEPHONE ENCOUNTER
Haresh was seen for a clinical swallow on 7/23/2020. At the time he was not believed to be aspirating, but did have dysphagia symptoms of globus sensation and throat clearing. He was provided oropharyngeal exercises for strengthening and improved function of the swallow.    He was called on 8/12/2020 to follow up and report he has noticed an improvement in his swallow and is having little to no globus sensation or any difficulty eating. He was recommended to continue exercises to sustain these improvements and recommended to contact his physician with any concerns or difficulty in the future.    Jesusita Chowdhury MA, [facility]-SLP  [facility]  [phone] (Phone)  [phone] (Fax)

## 2020-08-13 ENCOUNTER — PRE VISIT (OUTPATIENT)
Dept: OTOLARYNGOLOGY | Facility: CLINIC | Age: 70
End: 2020-08-13

## 2020-08-14 ENCOUNTER — TRANSFERRED RECORDS (OUTPATIENT)
Dept: HEALTH INFORMATION MANAGEMENT | Facility: CLINIC | Age: 70
End: 2020-08-14

## 2020-08-14 ENCOUNTER — ANTICOAGULATION THERAPY VISIT (OUTPATIENT)
Dept: FAMILY MEDICINE | Facility: CLINIC | Age: 70
End: 2020-08-14

## 2020-08-14 DIAGNOSIS — Z86.718 PERSONAL HISTORY OF DVT (DEEP VEIN THROMBOSIS): ICD-10-CM

## 2020-08-14 DIAGNOSIS — Z79.01 LONG TERM CURRENT USE OF ANTICOAGULANT THERAPY: ICD-10-CM

## 2020-08-14 DIAGNOSIS — D68.61 ANTIPHOSPHOLIPID ANTIBODY SYNDROME (H): ICD-10-CM

## 2020-08-14 DIAGNOSIS — I48.0 PAROXYSMAL ATRIAL FIBRILLATION (H): ICD-10-CM

## 2020-08-14 DIAGNOSIS — I48.91 ATRIAL FIBRILLATION, UNSPECIFIED TYPE (H): ICD-10-CM

## 2020-08-14 LAB — INR PPP: 2.9 (ref 0.9–1.1)

## 2020-08-14 NOTE — PROGRESS NOTES
ANTICOAGULATION MANAGEMENT     Patient Name:  Haresh Rock  Date:  2020    ASSESSMENT /SUBJECTIVE:    Today's INR result of 2.9 is therapeutic. Goal INR of 2.0-3.0      Warfarin dose taken: Warfarin taken as previously instructed    Diet: No new diet changes affecting INR    Medication changes/ interactions: No new medications/supplements affecting INR    Previous INR: Subtherapeutic     S/S of bleeding or thromboembolism: No    New injury or illness: No    Upcoming surgery, procedure or cardioversion: No    Additional findings: None      PLAN:    Spoke with Haresh regarding INR result and instructed:     Warfarin Dosing Instructions: Continue your current warfarin dose      1.25 mg every Mon, Fri; 2.5 mg all other days       Instructed patient to follow up no later than: 1 week  Patient to recheck with home meter    Education provided: Target INR goal and significance of current INR result. Advised if next INR is in range we will return to manage by exception with his home monitor.       Haresh verbalizes understanding and agrees to warfarin dosing plan.    Instructed to call the Anticoagulation Clinic for any changes, questions or concerns. (#[phone])        Nina Ventura RN      OBJECTIVE:  Recent labs: (last 7 days)     20   INR 2.9*         No question data found.  Anticoagulation Summary  As of 2020    INR goal:   2.0-3.0   TTR:   93.3 % (1 y)   INR used for dosin.9 (2020)   Warfarin maintenance plan:   1.25 mg (2.5 mg x 0.5) every Mon, Fri; 2.5 mg (2.5 mg x 1) all other days   Full warfarin instructions:   1.25 mg every Mon, Fri; 2.5 mg all other days   Weekly warfarin total:   15 mg   No change documented:   Nina Ventura RN   Plan last modified:   Laverne Ferguson RN (2018)   Next INR check:   2020   Priority:   Maintenance   Target end date:   Indefinite    Indications    Long-term (current) use of anticoagulants [Z79.01] [Z79.01]  Atrial fibrillation (H)  [I48.91]  Antiphospholipid antibody syndrome (H) [D68.61]  Personal history of DVT (deep vein thrombosis) [Z86.718]  Atrial fibrillation  unspecified type (H) [I48.91]             Anticoagulation Episode Summary     INR check location:       Preferred lab:   EXTERNAL LAB    Send INR reminders to:   CHELSEA CHILEL    Comments:   JESSICA okay to manage by exception      Anticoagulation Care Providers     Provider Role Specialty Phone number    Anya Nowak MD [facility] [phone]

## 2020-08-20 DIAGNOSIS — Z86.718 PERSONAL HISTORY OF DVT (DEEP VEIN THROMBOSIS): ICD-10-CM

## 2020-08-20 DIAGNOSIS — E78.5 HYPERLIPIDEMIA WITH TARGET LDL LESS THAN 100: ICD-10-CM

## 2020-08-20 DIAGNOSIS — I48.91 ATRIAL FIBRILLATION, UNSPECIFIED TYPE (H): ICD-10-CM

## 2020-08-20 DIAGNOSIS — D68.61 ANTIPHOSPHOLIPID ANTIBODY SYNDROME (H): ICD-10-CM

## 2020-08-20 LAB
CHOLEST SERPL-MCNC: 121 MG/DL
HDLC SERPL-MCNC: 31 MG/DL
LDLC SERPL CALC-MCNC: 64 MG/DL
NONHDLC SERPL-MCNC: 90 MG/DL
TRIGL SERPL-MCNC: 131 MG/DL

## 2020-08-20 PROCEDURE — 80061 LIPID PANEL: CPT | Performed by: PHYSICIAN ASSISTANT

## 2020-08-20 PROCEDURE — 36415 COLL VENOUS BLD VENIPUNCTURE: CPT | Performed by: PHYSICIAN ASSISTANT

## 2020-08-21 ENCOUNTER — ANTICOAGULATION THERAPY VISIT (OUTPATIENT)
Dept: FAMILY MEDICINE | Facility: CLINIC | Age: 70
End: 2020-08-21

## 2020-08-21 DIAGNOSIS — I48.91 ATRIAL FIBRILLATION, UNSPECIFIED TYPE (H): ICD-10-CM

## 2020-08-21 DIAGNOSIS — D68.61 ANTIPHOSPHOLIPID ANTIBODY SYNDROME (H): ICD-10-CM

## 2020-08-21 DIAGNOSIS — Z86.718 PERSONAL HISTORY OF DVT (DEEP VEIN THROMBOSIS): ICD-10-CM

## 2020-08-21 DIAGNOSIS — Z79.01 LONG TERM CURRENT USE OF ANTICOAGULANT THERAPY: ICD-10-CM

## 2020-08-21 DIAGNOSIS — I48.0 PAROXYSMAL ATRIAL FIBRILLATION (H): ICD-10-CM

## 2020-08-21 LAB — INR PPP: 3.4 (ref 0.9–1.1)

## 2020-08-21 NOTE — PROGRESS NOTES
ANTICOAGULATION MANAGEMENT     Patient Name:  Haresh Rock  Date:  8/21/2020    ASSESSMENT /SUBJECTIVE:    Today's INR result of 3.4 is supratherapeutic. Goal INR of 2.0-3.0      Warfarin dose taken: Warfarin taken as previously instructed    Diet: No new diet changes affecting INR    Medication changes/ interactions: No new medications/supplements affecting INR    Previous INR: Therapeutic     S/S of bleeding or thromboembolism: No    New injury or illness: No    Upcoming surgery, procedure or cardioversion: No    Additional findings: None      PLAN:    Spoke with Haresh regarding INR result and instructed:     Warfarin Dosing Instructions: Take 1.25 mg tomorrow only (already took today's dose then continue your current warfarin dose of 1.25 mg every Mon, Fri; 2.5 mg all other days    Instructed patient to follow up no later than: 1 week  Patient to recheck with home meter    Education provided: Importance of consistent vitamin K intake, Target INR goal and significance of current INR result, Importance of following up for INR monitoring at instructed interval and Importance of taking warfarin as instructed      Haresh verbalizes understanding and agrees to warfarin dosing plan.    Instructed to call the Anticoagulation Clinic for any changes, questions or concerns. (#[phone])        Karin Ramirez RN      OBJECTIVE:  Recent labs: (last 7 days)     08/21/20   INR 3.4*         No question data found.  Anticoagulation Summary  As of 8/21/2020    INR goal:   2.0-3.0   TTR:   93.7 % (1 y)   INR used for dosing:   3.4! (8/21/2020)   Warfarin maintenance plan:   1.25 mg (2.5 mg x 0.5) every Mon, Fri; 2.5 mg (2.5 mg x 1) all other days   Full warfarin instructions:   8/22: 1.25 mg; Otherwise 1.25 mg every Mon, Fri; 2.5 mg all other days   Weekly warfarin total:   15 mg   Plan last modified:   Laverne Ferguson RN (11/14/2018)   Next INR check:   8/28/2020   Priority:   Maintenance   Target end date:   Indefinite     Indications    Long-term (current) use of anticoagulants [Z79.01] [Z79.01]  Atrial fibrillation (H) [I48.91]  Antiphospholipid antibody syndrome (H) [D68.61]  Personal history of DVT (deep vein thrombosis) [Z86.718]  Atrial fibrillation  unspecified type (H) [I48.91]             Anticoagulation Episode Summary     INR check location:       Preferred lab:   EXTERNAL LAB    Send INR reminders to:   CHELSEA CHILEL    Comments:   JESSICA okay to manage by exception      Anticoagulation Care Providers     Provider Role Specialty Phone number    Anya Nowak MD Referring [facility] [phone]

## 2020-08-29 ENCOUNTER — TRANSFERRED RECORDS (OUTPATIENT)
Dept: HEALTH INFORMATION MANAGEMENT | Facility: CLINIC | Age: 70
End: 2020-08-29

## 2020-08-29 LAB — INR PPP: 2.7 (ref 0.9–1.1)

## 2020-08-31 ENCOUNTER — VIRTUAL VISIT (OUTPATIENT)
Dept: OTOLARYNGOLOGY | Facility: CLINIC | Age: 70
End: 2020-08-31
Attending: INTERNAL MEDICINE
Payer: COMMERCIAL

## 2020-08-31 ENCOUNTER — ANTICOAGULATION THERAPY VISIT (OUTPATIENT)
Dept: FAMILY MEDICINE | Facility: CLINIC | Age: 70
End: 2020-08-31

## 2020-08-31 VITALS — HEIGHT: 71 IN | WEIGHT: 178 LBS | BODY MASS INDEX: 24.92 KG/M2

## 2020-08-31 DIAGNOSIS — Z79.01 LONG TERM CURRENT USE OF ANTICOAGULANT THERAPY: ICD-10-CM

## 2020-08-31 DIAGNOSIS — R49.0 DYSPHONIA: ICD-10-CM

## 2020-08-31 DIAGNOSIS — R13.10 DYSPHAGIA: ICD-10-CM

## 2020-08-31 DIAGNOSIS — D68.61 ANTIPHOSPHOLIPID ANTIBODY SYNDROME (H): ICD-10-CM

## 2020-08-31 DIAGNOSIS — I48.0 PAROXYSMAL ATRIAL FIBRILLATION (H): ICD-10-CM

## 2020-08-31 DIAGNOSIS — R13.10 DYSPHAGIA, UNSPECIFIED TYPE: Primary | ICD-10-CM

## 2020-08-31 DIAGNOSIS — R09.89 CHRONIC THROAT CLEARING: Primary | ICD-10-CM

## 2020-08-31 DIAGNOSIS — C81.99 HODGKIN'S DISEASE OF EXTRANODAL OR SOLID ORGAN SITE (H): ICD-10-CM

## 2020-08-31 DIAGNOSIS — E83.110 HEREDITARY HEMOCHROMATOSIS (H): ICD-10-CM

## 2020-08-31 DIAGNOSIS — I48.91 ATRIAL FIBRILLATION, UNSPECIFIED TYPE (H): ICD-10-CM

## 2020-08-31 DIAGNOSIS — Z86.718 PERSONAL HISTORY OF DVT (DEEP VEIN THROMBOSIS): ICD-10-CM

## 2020-08-31 DIAGNOSIS — R12 HEARTBURN: ICD-10-CM

## 2020-08-31 ASSESSMENT — PAIN SCALES - GENERAL: PAINLEVEL: NO PAIN (0)

## 2020-08-31 ASSESSMENT — MIFFLIN-ST. JEOR: SCORE: 1579.53

## 2020-08-31 NOTE — PROGRESS NOTES
Haresh Rock is a 70 year old male who is being evaluated via a billable video visit.      The patient has been notified and verbally consented to the following:     This video visit will be conducted between you and your provider.    Patient has opted to conduct today's video visit vs an in-person appointment, and is not able to attend due to possible exposure to COVID-19.      If during the course of the call the provider feels a video visit is not appropriate, you will not be charged for this service.    Call initiated at: 9:20  Type of Video Platform Used: Si TV  Location of provider: Residence  Location of patient: Residence    [facility]  Evaluation report    Clinician: Yash Vidales M.M., M.A., CCC/SLP  Seen in conjunction with: Dr. Mitchell  Referring physician:  Dr. Miller  Patient: Haresh Rock  Date of Visit: 8/31/2020    HISTORY  Chief complaint: Haresh Rock is a 70 year old gentleman presenting today for evaluation of dysphagia, hoarseness, and cough.    Salient history: He has an extremely complex medical history including a myeloproliferative disorder of an atypical nature, status post non-myelo ablative allosib peripheral blood stem cell transplant.  A history of chronic upqex-fgtxeo-fztx disease, cirrhosis of the liver, hemochromatosis, cardiac arrhythmias, and Hodgkin's disease.  With regards to ENT symptoms he had previously been reporting some dysphagia symptoms and had a clinical swallow eval on 7/23/2020 that did not show signs of aspiration.  He did report globus sensation which improved with liquid wash following solid consistencies.  More recently when followed up with by the speech pathologist patient reported greatly improved symptoms and was encouraged to continue use of therapeutic exercises.      Today he reports ongoing issues of coughing with swallowing, a longstanding history of cough and throat clearing, and recent degradation of voice quality over the past 6  "months.  Over our conversation it also came to light that he had been experiencing blood-tinged phlegm upon clearing his throat in the morning recently, and had also experienced an uptick in his reflux symptoms despite ongoing use of pantoprazole, but which he has been able to manage through the use of antacid tablets.    CURRENT SYMPTOMS INCLUDE  VOICE    Finds that he has points where his voice gets weak and it feels there is something in the way and that will result in clearing his throat    In the last 6 months he feels that voice difficulties have changed    Seems to improve with use    Taking sips of water seems to help    Despite the pandemic using voice consistently to pre-COVID    Sometimes voice can be normal in quality    Today is \"as good as it gets\"    Difficulty projecting    COUGH/THROAT CLEARING    In addition to cough associated with swallowing as listed below he reports longstanding cough and throat clearing  o This seems to bother him less than it bothers his wife and those around him     Recently he will clear his throat in the morning and it will be tinged with a bit of blood  o No clear origin  o Hasn't seen anyone else for this issue    SWALLOWING    Sensation of food getting stuck    Ongoing 2-3 months    He reports a sensation of food getting caught in the back of his throat which then affects his voice and bothers him for the rest of the day.    This sensation leads to cough and throat clearing in an attempt to relieve it    Water helps to some extent, as does movement    He reported feeling that the exercises that were given to him \"were not especially helpful\"    BREATHING    Catch in his breath when he lays on his left side    Longstanding    Otherwise normal    ADDITIONAL    Occasional indigestion / reflux    Takes tums to manage symptoms    Taking reflux medications    OTHER PERTINENT HISTORY    Otherwise unknown.  Please also refer to Dr. Mitchell's dictation.     Past Medical History: "   Diagnosis Date     Antiphospholipid antibody syndrome (H)     Ravi's     Atrial fibrillation (H) 2011     Cirrhosis (H)      CKD (chronic kidney disease) stage 2, GFR 60-89 ml/min      Diabetes mellitus type II     steroid induced     DJD (degenerative joint disease) of knee     SAHWN, worse on right     DVT (deep venous thrombosis) (H)      ED (erectile dysfunction)      Gallbladder polyp 2010     Kdebq-Amvjfx-Rcgq Disease     BMT     Hemochromatosis      Hodgkin's disease NOS     5 cycles of ABVD in      HTN (hypertension)      Hyperlipidaemia LDL goal <100      Multiple thyroid nodules     benign      Myeloproliferative disorder (H)     atypical, [facility] [place]     PE (pulmonary embolism) 2004     Polyneuropathy      Primary pulmonary hypertension (H)      Past Surgical History:   Procedure Laterality Date     ANESTHESIA CARDIOVERSION  2011    Procedure:ANESTHESIA CARDIOVERSION; Surgeon:GENERIC ANESTHESIA PROVIDER; [facility]     ARTHROSCOPY KNEE RT/LT      LT     CATARACT IOL, RT/LT  2017     COLONOSCOPY  10/16/2012    Procedure: COLONOSCOPY;  Colonoscopy, screening;  Surgeon: Reg Feliciano MD;  Location: [facility]     H ABLATION FOCAL AFIB  3/5/2013    PVI     H ABLATION FOCAL AFIB  2018     HC BONE MARROW/STEM TRANSPLANT ALLOGENIC  2007     INSERT PORT VASCULAR ACCESS       JOINT REPLACEMTN, KNEE RT/LT  3/28/12    SHAWN     VASECTOMY         OBJECTIVE  PATIENT REPORTED MEASURES    Effort to talk: 6 / 10 (0-10 in which 10 represents maximal effort)    Effort to sin / 10 (0-10 in which 10 represents maximal effort)    Voice quality: 3 / 10 (0-10 in which 10 represents best possible voice)  Patient Supplied Answers To VHI Questionnaire  Voice Handicap Index (VHI-10) 2020   My voice makes it difficult for people to hear me 2   People have difficulty understanding me in a noisy room 2   My voice difficulties restrict my personal and social life.  2   I feel left out of  "conversations because of my voice 1   My voice problem causes me to lose income 0   I feel as though I have to strain to produce voice 1   The clarity of my voice is unpredictable 2   My voice problem upsets me 2   My voice makes me feel handicapped 1   People ask, \"What's wrong with your voice?\" 2   VHI-10 15     Patient Supplied Answers To CSI Questionnaire  Cough Severity Index (CSI) 8/29/2020   My cough is worse when I lie down 1   My coughing problem causes me to restrict my personal and social life 1   I tend to avoid places because of my cough problem 1   I feel embarrassed because of my coughing problem 3   People ask, ''What's wrong?'' because I cough a lot 2   I run out of air when I cough 1   My coughing problem affects my voice 3   My coughing problem limits my physical activity 1   My coughing problem upsets me 3   People ask me if I am sick because I cough a lot 2   CSI Score 18     PERCEPTUAL EVALUATION (CPT 25502)  POSTURE / TENSION:     Restricted range of motion of his neck in all dimensions    BREATHING:     excessive thoracic muscle use pattern    shallow    phonation is not coordinated with respiration    VOICE:    Roughness: Mild Intermittent    Breathiness: Minimal    Strain: Mild Consistent     Aesthenia - mild to moderate intermittent    MPT -     /a/ - 16 seconds    /s/ - 6 seconds    Loudness    Conversational speech:  Mildly reduced    Projected speech:  WNL with coaching from clinician    Pitch:    Conversational speech:  Mildly elevated 143 hz    Pitch glide:     Halting, but fair access to loft register    Resonance:    Conversational speech:  backward focus of resonance    Singing vs. Speech:  Decreased roughness and aesthenia in singing vs. Running speech    CAPE-V Overall Severity:  22/100    COUGH/THROAT CLEARING:    Not observed    THERAPY PROBES: Improvement was elicited with use of forward resonant stimuli, use of clear speech protocol and coordination of respiration and " phonation    ASSESSMENT / PLAN  IMPRESSIONS: Haresh Rock is presenting today with R49.0 (Dysphonia), R68.89 (Chronic Throat Clearing) and R13.10 (Dysphagia) in the context of a complex medical history.  With regards to the patient's dysphonia, perceptual evaluation demonstrates asthenia, and mild roughness and strain corresponding to poor coordination of voicing with respiratory drive, and backward focused resonance.  Although the patient described difficulties both on his intake form and in discussion that seemed to represent limitations with regard to voice he denied to being sufficiently bothered by these changes to warrant pursuing voice focused speech therapy.  His primary complaint at this point revolves around his swallowing concerns, and formal evaluation of swallowing is warranted to characterize this fully.    STIMULABILITY: results of therapy probes during perceptual and laryngeal evaluation demonstrate improvement with use of forward resonant stimuli, use of clear speech protocol and coordination of respiration and phonation    RECOMMENDATIONS:     Formal videofluoroscopic swallow study to fully charaterize his current function and note any correlation with swallowing difficulties and cough    Given worsening heartburn symptoms followup with GI is recommended    A course of speech therapy is recommended to optimize vocal technique and improve voice quality; however, at this point this is of a lesser concern to him, and so we will defer at this time.    Today's appointment is evaluation only.     This treatment plan was developed with the patient who agreed with the recommendations.      TOTAL SERVICE TIME: 45 minutes  EVALUATION OF VOICE AND RESONANCE (12712)  NO CHARGE FACILITY FEE (47289)    Yash Vidales M.M., M.A., CCC-SLP  Speech-Language Pathologist  Certificate of Vocology  [phone]    *this report was created in part through the use of computerized dictation software, and though reviewed  following completion, some typographic errors may persist.  If there is confusion regarding any of this notes contents, please contact me for clarification.*

## 2020-08-31 NOTE — LETTER
"8/31/2020      RE: Haresh Rock  [address] [place] [address]       Haresh Rock is a 70 year old male who is being evaluated via a billable video visit.      The patient has been notified of following:     \"This video visit will be conducted via a call between you and your physician/provider. We have found that certain health care needs can be provided without the need for an in-person physical exam.  This service lets us provide the care you need with a video conversation.  If a prescription is necessary we can send it directly to your pharmacy.  If lab work is needed we can place an order for that and you can then stop by our lab to have the test done at a later time.    Video visits are billed at different rates depending on your insurance coverage.  Please reach out to your insurance provider with any questions.    If during the course of the call the physician/provider feels a video visit is not appropriate, you will not be charged for this service.\"    Patient has given verbal consent for Video visit? Yes  How would you like to obtain your AVS? MyChart  Will anyone else be joining your video visit? No        Video-Visit Details    Type of service:  Video Visit    Video Start Time: 9:19 AM     During this virtual visit the patient is located in [place], patient verifies this as the location during the entirety of this visit.       Dear Dr. Miller:    I had the pleasure of meeting Haresh Rock in consultation at the [facility] the [facility] at your request, for evaluation of throat concerns. The note from our visit follows. Speech recognition software may have been used in the documentation below; input is reviewed before signature to the best of my ability. I appreciate the opportunity to participate in the care of this pleasant patient.    Please feel free to contact me with any questions.    Sincerely yours,    Cindy Mitchell M.D., " ZULEYMA.  , Laryngology  Director, [facility]  [facility]  Otolaryngology- Head & Neck Surgery  [phone]          =====  HISTORY OF PRESENT ILLNESS:   Haresh Rock is a pleasant 70-year-old male with a complex past medical history including atypical myeloproliferative disorder, status post peripheral blood stem cell transplant, chronic GVHD, cirrhosis of the liver,  Hemochromatosis, pulmonary emboli, cardiac arrhythmias with an ablation, and Hodgkin's disease who presents with a more than six month history of throat concerns. Symptoms include increased effort to talk/sing, throat irritation, mucus in throat, frequent throat-clearing and poor voice quality.      Voice  He reports a six month history of voice concerns associated with reflux and illness. These symptoms bother him quite a bit. He considers it a moderate problem.  He has been stable over time.  His typical vocal demand is intermittent with mostly socializing.  He notes increased vocal effort especially with singing.  He also notes his voice cuts off and he has reduced loudness.  He also notes a raspy voice.  His voice can get weak and higher pitched.  He feels his voice is sometimes normal.  He has problems with his high and mid ranges.     When attention is brought to his backwards resonance, he acknowledges this and notes he thinks it is new over the past six months or so.  He has not noted any other symptoms e.g. /tremor/gait issues.  He feels his voice improves with use. Overall, his voice use has not changed that much despite COVID-19.  He overall notes increased vocal effort.      Swallowing  His primary reason for referral was dysphagia, particularly to solids over the past two to three months.  He underwent a clinical swallow assessment July 2020, and was given oropharyngeal exercises. Subsequently, he reported some improvement with doing exercises, with reduction of globus sensation and difficulty  eating.     He reports he gets food caught at the back of his throat. It is hard to swallow but also hard to get out.  It seems to affect his voice and bothers him for the rest of the day.  It sometimes feels like it gets down the wrong tube and mostly it feels like it gets stuck and it is hard to swallow.  He coughs/throat-clears to try to relieve the feeling.  Water can help a little, and it seems to help to walk around a little also.      Cough/Throat-clearing  He also is noting substantial throat-clearing that is bothersome for years.  This began gradually, in association with reflux and illness, and is stable.  Eating and heartburn do trigger his symptoms.  He also notes it is worse in the evenings.  He has constant coughing and throat clearing that starts with a sensation in his throat.  He can go hours without coughing.  It is usually dry and non-productive.       Breathing  No concerns. He finds it harder to breathe if lies down on left side, which is longstanding.      Throat discomfort  He has also had throat clearing over this same period.      Reflux-type symptoms  He experiences heartburn/indigestion weekly. He is taking reflux medications. Tums do seem to help also. No recent endoscopies.      Prior Epic/outside records were reviewed for this visit. Sometimes he notes blood in his phlegm, but has had no prior workup.        MEDICATIONS:     Current Outpatient Medications   Medication Sig Dispense Refill     CENTRUM OR 1 TABLET DAILY       flecainide (TAMBOCOR) 50 MG tablet Take 1 tablet (50 mg) by mouth 2 times daily 180 tablet 3     folic acid (FOLVITE) 1 MG tablet Take 1 tablet (1,000 mcg) by mouth daily 90 tablet 3     metoprolol tartrate (LOPRESSOR) 25 MG tablet TAKE 1 TABLET BY MOUTH TWICE A  tablet 3     pantoprazole (PROTONIX) 20 MG EC tablet TAKE 1 TABLET (20 MG) BY MOUTH AT BEDTIME 90 tablet 3     PREVIDENT 5000 DRY MOUTH 1.1 % GEL topical gel Take by mouth daily  3      Pseudoeph-Doxylamine-DM-APAP (NYQUIL PO) Take by mouth as needed       simvastatin (ZOCOR) 10 MG tablet Take 1 tablet (10 mg) by mouth daily 90 tablet 3     warfarin ANTICOAGULANT (COUMADIN) 2.5 MG tablet TAKE 1.25 MG (1/2 TABLET) MON/FRI AND 2.5 MG (1 TABLET) ALL OTHER DAYS 72 tablet 1       ALLERGIES:    Allergies   Allergen Reactions     No Known Drug Allergy        PAST MEDICAL HISTORY:   Past Medical History:   Diagnosis Date     Antiphospholipid antibody syndrome (H) 2005    Ravi's     Atrial fibrillation (H) 4/2011     Cirrhosis (H)      CKD (chronic kidney disease) stage 2, GFR 60-89 ml/min      Diabetes mellitus type II     steroid induced     DJD (degenerative joint disease) of knee     SHAWN, worse on right     DVT (deep venous thrombosis) (H)      ED (erectile dysfunction)      Gallbladder polyp 5/2010     Mcwbf-Pjapch-Gmyt Disease 2007    BMT     Hemochromatosis      Hodgkin's disease NOS     5 cycles of ABVD in 2011     HTN (hypertension)      Hyperlipidaemia LDL goal <100      Multiple thyroid nodules     benign      Myeloproliferative disorder (H)     atypical, [facility]     PE (pulmonary embolism) 11/2004     Polyneuropathy      Primary pulmonary hypertension (H)         PAST SURGICAL HISTORY:   Past Surgical History:   Procedure Laterality Date     ANESTHESIA CARDIOVERSION  4/21/2011    Procedure:ANESTHESIA CARDIOVERSION; Surgeon:GENERIC ANESTHESIA PROVIDER; Location:[facility]     ARTHROSCOPY KNEE RT/LT      LT     CATARACT IOL, RT/LT  2017     COLONOSCOPY  10/16/2012    Procedure: COLONOSCOPY;  Colonoscopy, screening;  Surgeon: Reg Feliciano MD;  Location: [facility] OR     H ABLATION FOCAL AFIB  3/5/2013    PVI     H ABLATION FOCAL AFIB  01/01/2018     HC BONE MARROW/STEM TRANSPLANT ALLOGENIC  5/8/2007     INSERT PORT VASCULAR ACCESS       JOINT REPLACEMTN, KNEE RT/LT  3/28/12    SHAWN     VASECTOMY  1990       HABITS/SOCIAL HISTORY:    Social History     Tobacco Use     Smoking status: Never Smoker      Smokeless tobacco: Never Used   Substance Use Topics     Alcohol use: No         FAMILY HISTORY:    Family History   Problem Relation Age of Onset     Hypertension Mother      Cerebrovascular Disease Mother      Breast Cancer Mother      Arrhythmia Brother      Thyroid Cancer Daughter      Bipolar Disorder Daughter      Arrhythmia Sister      Alzheimer Disease Father      Diabetes Paternal Aunt      Gastrointestinal Disease Maternal Grandmother         colitis      Thyroid Disease Sister      C.A.D. No family hx of        REVIEW OF SYSTEMS:  The patient completed a comprehensive 11 point review of systems (below), which was reviewed. Positives are as noted below; pertinent findings are as noted in the HPI.     Patient Supplied Answers to Review of Systems   ENT ROS 8/29/2020   Constitutional Weight loss   Neurology Dizzy spells   Ears, Nose, Throat Hearing loss, Sore throat, Trouble swallowing, Hoarseness, Coughing up blood   Gastrointestinal/Genitourinary Heartburn/indigestion       PHYSICAL EXAMINATION:  General: The patient was alert and conversant, and in no acute distress.    Eyes: EOM normal, Conjunctiva and lids normal.  Nose: Grossly unremarkable; no  apparent bleeding.   Oral cavity/oropharynx: No visible anterior masses or lesions. Tongue mobility intact and symmetric.  Ears: Normal auricles bilaterally.  Neck: No appreciable cervical masses. Mildly limited neck extension and rotation.   Resp: Breathing comfortably, no stridor or stertor.  Neuro: Symmetric facial function. Other cranial nerves as documented above.  Psych: Normal affect, pleasant and cooperative. Possible mild psychomotor retardation, difficult to tell whether this is attributable to video lag.  Voice/speech: Mild to moderate dysphonia characterized by breathiness and poor respiratory support. Pitch glide: intact. MPT: wnl. Backwards resonance.  Extremities: No cyanosis, clubbing, or edema of the upper extremities.    Speech pathologist  present: Yash Vidales MM, MA, CCC-SLP     Intake scores  Total Score for Last Patient-Answered VHI Questionnaire  No flowsheet data found.  Total Score for Last Patient-Answered EAT Questionnaire  No flowsheet data found.  Total Score for Last Patient-Answered CSI Questionnaire  No flowsheet data found.    PROCEDURE: Deferred; telemedicine    LABS:    IMAGING/RESULTS reviewed, with pertinent report excerpts below:  CT Neck with contrast 11/17/2017  Impression:   1. No evidence of recurrent lymphoma in the neck.  2. Complete resolution of previously noted paranasal sinus  opacifications.    IMPRESSION AND PLAN:   Haresh Rock presents with multiple concerns including dysphonia, dysphagia, chronic cough/throat-clearing, blood-tinged sputum, and reflux/heartburn, in the context of a complex past medical history including myelodysplastic disorder status post stem cell transplant, Hodgkin's, hemochromatosis, PE, and cardiac arrythmias.    Recommendations:  1) I recommended a video fluoro swallow study with esophagram for further evaluation of swallowing. I encouraged immediate adoption of any strategies and/or exercises learned at that evaluation. He was comfortable proceeding with this.    2) He understands that we also need to complete a laryngoscopy/stroboscopy in the office. We also discussed that blood-tinged sputum can result from other etiologies as well (e.g. Gastroenterology/Pulmonary) and he plans to discuss this with his other providers as well.    3) We also recommended voice-focused speech therapy given difficulty with communication and social interaction, with goals including improving laryngeal efficiency and improving respiratory/phonatory coordination. He would like to put this on the back burner for now.     4) If heartburn persists despite pantoprazole, may benefit from Gastroenterology referral as well. He would like to proceed with this.    5) Today there were aspects of his presentation that  seemed potentially consistent with Parkinson-type changes, but he reports no neurological concerns other than his vocal weakness and did not wish to proceed with further evaluation. Therefore we did not immediately pursue this further, but if it there is any progression I asked him to let us know. We will also have more information once his swallow evaluation is completed.    6) Given his report of blood-tinged sputum and history of lymphoma we also discussed consideration of a CT Neck with contrast, which he would like to complete. I will connect with Dr Portillo at the patient's request regarding a potential need for CT chest/abdomen/pelvis at the same time.      He is comfortable with the plan. I appreciate the opportunity to participate in the care of this pleasant patient.       Video End Time: 9:57 AM    Originating Location (pt. Location): Home    Distant Location (provider location):  [facility]     Platform used for Video Visit: Good Mitchell MD

## 2020-08-31 NOTE — PATIENT INSTRUCTIONS
1.  You were seen in the ENT Clinic today by . If you have any questions or concerns after your appointment, please call [phone]. Press option #1 for scheduling related needs. Press option #3 for Nurse advice.    2.   has recommended the following:   - video fluoro swallow study with esophagram for further evaluation of swallowing. I encouraged immediate adoption of any strategies and/or exercises learned at that evaluation.               - complete a laryngoscopy/stroboscopy in the office. We also discussed that blood-tinged sputum can result from other etiologies as well (e.g. GI/pulmonary) and he plans to discuss this with his other providers as well.    -4) GI referral    3.  Plan is to return to clinic in approximately 6 weeks for laryngoscopy/stroboscopy as stated above.      Cheyenne Carney LPN  [facility]

## 2020-08-31 NOTE — LETTER
8/31/2020       RE: Haresh Rock  [address] [place] [address]     Dear Colleague,    Thank you for referring your patient, Haresh Rock, to the [facility] at [facility]. Please see a copy of my visit note below.    Haresh Rock is a 70 year old male who is being evaluated via a billable video visit.      The patient has been notified and verbally consented to the following:     This video visit will be conducted between you and your provider.    Patient has opted to conduct today's video visit vs an in-person appointment, and is not able to attend due to possible exposure to COVID-19.      If during the course of the call the provider feels a video visit is not appropriate, you will not be charged for this service.    Call initiated at: 9:20  Type of Video Platform Used: Tern  Location of provider: Residence  Location of patient: [facility]  Evaluation report    Clinician: Yash Vidales M.M., M.A., CCC/SLP  Seen in conjunction with: Dr. Mitchell  Referring physician:  Dr. Miller  Patient: Haresh Rock  Date of Visit: 8/31/2020    HISTORY  Chief complaint: Haresh Rock is a 70 year old gentleman presenting today for evaluation of dysphagia, hoarseness, and cough.    Salient history: He has an extremely complex medical history including a myeloproliferative disorder of an atypical nature, status post non-myelo ablative allosib peripheral blood stem cell transplant.  A history of chronic cymap-ycwuws-gztz disease, cirrhosis of the liver, hemochromatosis, cardiac arrhythmias, and Hodgkin's disease.  With regards to ENT symptoms he had previously been reporting some dysphagia symptoms and had a clinical swallow eval on 7/23/2020 that did not show signs of aspiration.  He did report globus sensation which improved with liquid wash following solid consistencies.  More recently when followed up with by the speech pathologist patient  "reported greatly improved symptoms and was encouraged to continue use of therapeutic exercises.      Today he reports ongoing issues of coughing with swallowing, a longstanding history of cough and throat clearing, and recent degradation of voice quality over the past 6 months.  Over our conversation it also came to light that he had been experiencing blood-tinged phlegm upon clearing his throat in the morning recently, and had also experienced an uptick in his reflux symptoms despite ongoing use of pantoprazole, but which he has been able to manage through the use of antacid tablets.    CURRENT SYMPTOMS INCLUDE  VOICE    Finds that he has points where his voice gets weak and it feels there is something in the way and that will result in clearing his throat    In the last 6 months he feels that voice difficulties have changed    Seems to improve with use    Taking sips of water seems to help    Despite the pandemic using voice consistently to pre-COVID    Sometimes voice can be normal in quality    Today is \"as good as it gets\"    Difficulty projecting    COUGH/THROAT CLEARING    In addition to cough associated with swallowing as listed below he reports longstanding cough and throat clearing  o This seems to bother him less than it bothers his wife and those around him     Recently he will clear his throat in the morning and it will be tinged with a bit of blood  o No clear origin  o Hasn't seen anyone else for this issue    SWALLOWING    Sensation of food getting stuck    Ongoing 2-3 months    He reports a sensation of food getting caught in the back of his throat which then affects his voice and bothers him for the rest of the day.    This sensation leads to cough and throat clearing in an attempt to relieve it    Water helps to some extent, as does movement    He reported feeling that the exercises that were given to him \"were not especially helpful\"    BREATHING    Catch in his breath when he lays on his left " side    Longstanding    Otherwise normal    ADDITIONAL    Occasional indigestion / reflux    Takes tums to manage symptoms    Taking reflux medications    OTHER PERTINENT HISTORY    Otherwise unknown.  Please also refer to Dr. Mitchell's dictation.     Past Medical History:   Diagnosis Date     Antiphospholipid antibody syndrome (H)     Ravi's     Atrial fibrillation (H) 2011     Cirrhosis (H)      CKD (chronic kidney disease) stage 2, GFR 60-89 ml/min      Diabetes mellitus type II     steroid induced     DJD (degenerative joint disease) of knee     SHAWN, worse on right     DVT (deep venous thrombosis) (H)      ED (erectile dysfunction)      Gallbladder polyp 2010     Tjxri-Rzycml-Nvaf Disease     BMT     Hemochromatosis      Hodgkin's disease NOS     5 cycles of ABVD in      HTN (hypertension)      Hyperlipidaemia LDL goal <100      Multiple thyroid nodules     benign      Myeloproliferative disorder (H)     atypical, [facility]     PE (pulmonary embolism) 2004     Polyneuropathy      Primary pulmonary hypertension (H)      Past Surgical History:   Procedure Laterality Date     ANESTHESIA CARDIOVERSION  2011    Procedure:ANESTHESIA CARDIOVERSION; Surgeon:GENERIC ANESTHESIA PROVIDER; Location:[facility] OR     ARTHROSCOPY KNEE RT/LT      LT     CATARACT IOL, RT/LT  2017     COLONOSCOPY  10/16/2012    Procedure: COLONOSCOPY;  Colonoscopy, screening;  Surgeon: Reg Feliciano MD;  Location: [facility] OR     H ABLATION FOCAL AFIB  3/5/2013    PVI     H ABLATION FOCAL AFIB  2018     HC BONE MARROW/STEM TRANSPLANT ALLOGENIC  2007     INSERT PORT VASCULAR ACCESS       JOINT REPLACEMTN, KNEE RT/LT  3/28/12    SHAWN     VASECTOMY         OBJECTIVE  PATIENT REPORTED MEASURES    Effort to talk: 6 / 10 (0-10 in which 10 represents maximal effort)    Effort to sin / 10 (0-10 in which 10 represents maximal effort)    Voice quality: 3 / 10 (0-10 in which 10 represents best possible voice)  Patient Supplied  "Answers To VHI Questionnaire  Voice Handicap Index (VHI-10) 8/29/2020   My voice makes it difficult for people to hear me 2   People have difficulty understanding me in a noisy room 2   My voice difficulties restrict my personal and social life.  2   I feel left out of conversations because of my voice 1   My voice problem causes me to lose income 0   I feel as though I have to strain to produce voice 1   The clarity of my voice is unpredictable 2   My voice problem upsets me 2   My voice makes me feel handicapped 1   People ask, \"What's wrong with your voice?\" 2   VHI-10 15     Patient Supplied Answers To CSI Questionnaire  Cough Severity Index (CSI) 8/29/2020   My cough is worse when I lie down 1   My coughing problem causes me to restrict my personal and social life 1   I tend to avoid places because of my cough problem 1   I feel embarrassed because of my coughing problem 3   People ask, ''What's wrong?'' because I cough a lot 2   I run out of air when I cough 1   My coughing problem affects my voice 3   My coughing problem limits my physical activity 1   My coughing problem upsets me 3   People ask me if I am sick because I cough a lot 2   CSI Score 18     PERCEPTUAL EVALUATION (CPT 41530)  POSTURE / TENSION:     Restricted range of motion of his neck in all dimensions    BREATHING:     excessive thoracic muscle use pattern    shallow    phonation is not coordinated with respiration    VOICE:    Roughness: Mild Intermittent    Breathiness: Minimal    Strain: Mild Consistent     Aesthenia - mild to moderate intermittent    MPT -     /a/ - 16 seconds    /s/ - 6 seconds    Loudness    Conversational speech:  Mildly reduced    Projected speech:  WNL with coaching from clinician    Pitch:    Conversational speech:  Mildly elevated 143 hz    Pitch glide:     Halting, but fair access to Nuroa register    Resonance:    Conversational speech:  backward focus of resonance    Singing vs. Speech:  Decreased roughness and " aesthenia in singing vs. Running speech    CAPE-V Overall Severity:  22/100    COUGH/THROAT CLEARING:    Not observed    THERAPY PROBES: Improvement was elicited with use of forward resonant stimuli, use of clear speech protocol and coordination of respiration and phonation    ASSESSMENT / PLAN  IMPRESSIONS: Haresh Rock is presenting today with R49.0 (Dysphonia), R68.89 (Chronic Throat Clearing) and R13.10 (Dysphagia) in the context of a complex medical history.  With regards to the patient's dysphonia, perceptual evaluation demonstrates asthenia, and mild roughness and strain corresponding to poor coordination of voicing with respiratory drive, and backward focused resonance.  Although the patient described difficulties both on his intake form and in discussion that seemed to represent limitations with regard to voice he denied to being sufficiently bothered by these changes to warrant pursuing voice focused speech therapy.  His primary complaint at this point revolves around his swallowing concerns, and formal evaluation of swallowing is warranted to characterize this fully.    STIMULABILITY: results of therapy probes during perceptual and laryngeal evaluation demonstrate improvement with use of forward resonant stimuli, use of clear speech protocol and coordination of respiration and phonation    RECOMMENDATIONS:     Formal videofluoroscopic swallow study to fully charaterize his current function and note any correlation with swallowing difficulties and cough    Given worsening heartburn symptoms followup with GI is recommended    A course of speech therapy is recommended to optimize vocal technique and improve voice quality; however, at this point this is of a lesser concern to him, and so we will defer at this time.    Today's appointment is evaluation only.     This treatment plan was developed with the patient who agreed with the recommendations.      TOTAL SERVICE TIME: 45 minutes  EVALUATION OF VOICE AND  RESONANCE (15142)  NO CHARGE FACILITY FEE (63437)    Yash Vidales M.M., M.A., CCC-SLP  Speech-Language Pathologist  Certificate of Vocology  [phone]    *this report was created in part through the use of computerized dictation software, and though reviewed following completion, some typographic errors may persist.  If there is confusion regarding any of this notes contents, please contact me for clarification.*      Again, thank you for allowing me to participate in the care of your patient.      Sincerely,    Daniel Vidales, SLP

## 2020-08-31 NOTE — PROGRESS NOTES
ANTICOAGULATION  MANAGEMENT-Patient Home Monitoring Result    Assessment     Therapeutic INR result of 2.7 . Goal range 2.0-3.0. Received via fax from [place] home INR monitoring company.        Previous INR was therapeutic    Haresh was last contacted by phone: 20    Plan     Per home monitoring agreement with patient, patient was NOT contacted regarding therapeutic result today.  Patient is to continue current dose and continue to check INR with home monitor per protocol.  ?       OBJECTIVE    INR   Date Value Ref Range Status   2020 2.7 (A) 0.90 - 1.10 Final     Factor 2 Assay   Date Value Ref Range Status   2007 58 (L) 60 - 140 % Final     Comment:     (Note)  CALLED TO TAMARA(INTERV. RADIOLOGY)[id number],        ASSESSMENT / PLAN  No question data found.  Anticoagulation Summary  As of 2020    INR goal:   2.0-3.0   TTR:   92.7 % (1 y)   INR used for dosin.7 (2020)   Warfarin maintenance plan:   1.25 mg (2.5 mg x 0.5) every Mon, Fri; 2.5 mg (2.5 mg x 1) all other days   Full warfarin instructions:   1.25 mg every Mon, Fri; 2.5 mg all other days   Weekly warfarin total:   15 mg   No change documented:   Darcy Michele RN   Plan last modified:   Laverne Ferguson RN (2018)   Next INR check:   2020   Priority:   Maintenance   Target end date:   Indefinite    Indications    Long-term (current) use of anticoagulants [Z79.01] [Z79.01]  Atrial fibrillation (H) [I48.91]  Antiphospholipid antibody syndrome (H) [D68.61]  Personal history of DVT (deep vein thrombosis) [Z86.718]  Atrial fibrillation  unspecified type (H) [I48.91]             Anticoagulation Episode Summary     INR check location:       Preferred lab:   EXTERNAL LAB    Send INR reminders to:   CHELSEA CHILEL    Comments:   AUSTIN rodas to manage by exception      Anticoagulation Care Providers     Provider Role Specialty Phone number    Anya Nowak MD [facility] [phone]

## 2020-08-31 NOTE — PROGRESS NOTES
"Haresh Rock is a 70 year old male who is being evaluated via a billable video visit.      The patient has been notified of following:     \"This video visit will be conducted via a call between you and your physician/provider. We have found that certain health care needs can be provided without the need for an in-person physical exam.  This service lets us provide the care you need with a video conversation.  If a prescription is necessary we can send it directly to your pharmacy.  If lab work is needed we can place an order for that and you can then stop by our lab to have the test done at a later time.    Video visits are billed at different rates depending on your insurance coverage.  Please reach out to your insurance provider with any questions.    If during the course of the call the physician/provider feels a video visit is not appropriate, you will not be charged for this service.\"    Patient has given verbal consent for Video visit? Yes  How would you like to obtain your AVS? MyChart  Will anyone else be joining your video visit? No        Video-Visit Details    Type of service:  Video Visit    Video Start Time: 9:19 AM     During this virtual visit the patient is located in [place], patient verifies this as the location during the entirety of this visit.       Dear Dr. Miller:    I had the pleasure of meeting Haresh Rock in consultation at the [facility] of the [facility] at your request, for evaluation of throat concerns. The note from our visit follows. Speech recognition software may have been used in the documentation below; input is reviewed before signature to the best of my ability. I appreciate the opportunity to participate in the care of this pleasant patient.    Please feel free to contact me with any questions.    Sincerely yours,    Cindy Mitchell M.D., M.P.H.  , Laryngology  Director, [facility] " [place]  Otolaryngology- Head & Neck Surgery  [phone]          =====  HISTORY OF PRESENT ILLNESS:   Haresh Rock is a pleasant 70-year-old male with a complex past medical history including atypical myeloproliferative disorder, status post peripheral blood stem cell transplant, chronic GVHD, cirrhosis of the liver,  Hemochromatosis, pulmonary emboli, cardiac arrhythmias with an ablation, and Hodgkin's disease who presents with a more than six month history of throat concerns. Symptoms include increased effort to talk/sing, throat irritation, mucus in throat, frequent throat-clearing and poor voice quality.      Voice  He reports a six month history of voice concerns associated with reflux and illness. These symptoms bother him quite a bit. He considers it a moderate problem.  He has been stable over time.  His typical vocal demand is intermittent with mostly socializing.  He notes increased vocal effort especially with singing.  He also notes his voice cuts off and he has reduced loudness.  He also notes a raspy voice.  His voice can get weak and higher pitched.  He feels his voice is sometimes normal.  He has problems with his high and mid ranges.     When attention is brought to his backwards resonance, he acknowledges this and notes he thinks it is new over the past six months or so.  He has not noted any other symptoms e.g. /tremor/gait issues.  He feels his voice improves with use. Overall, his voice use has not changed that much despite COVID-19.  He overall notes increased vocal effort.      Swallowing  His primary reason for referral was dysphagia, particularly to solids over the past two to three months.  He underwent a clinical swallow assessment July 2020, and was given oropharyngeal exercises. Subsequently, he reported some improvement with doing exercises, with reduction of globus sensation and difficulty eating.     He reports he gets food caught at the back of his throat. It is hard to  swallow but also hard to get out.  It seems to affect his voice and bothers him for the rest of the day.  It sometimes feels like it gets down the wrong tube and mostly it feels like it gets stuck and it is hard to swallow.  He coughs/throat-clears to try to relieve the feeling.  Water can help a little, and it seems to help to walk around a little also.      Cough/Throat-clearing  He also is noting substantial throat-clearing that is bothersome for years.  This began gradually, in association with reflux and illness, and is stable.  Eating and heartburn do trigger his symptoms.  He also notes it is worse in the evenings.  He has constant coughing and throat clearing that starts with a sensation in his throat.  He can go hours without coughing.  It is usually dry and non-productive.       Breathing  No concerns. He finds it harder to breathe if lies down on left side, which is longstanding.      Throat discomfort  He has also had throat clearing over this same period.      Reflux-type symptoms  He experiences heartburn/indigestion weekly. He is taking reflux medications. Tums do seem to help also. No recent endoscopies.      Prior Epic/outside records were reviewed for this visit. Sometimes he notes blood in his phlegm, but has had no prior workup.        MEDICATIONS:     Current Outpatient Medications   Medication Sig Dispense Refill     CENTRUM OR 1 TABLET DAILY       flecainide (TAMBOCOR) 50 MG tablet Take 1 tablet (50 mg) by mouth 2 times daily 180 tablet 3     folic acid (FOLVITE) 1 MG tablet Take 1 tablet (1,000 mcg) by mouth daily 90 tablet 3     metoprolol tartrate (LOPRESSOR) 25 MG tablet TAKE 1 TABLET BY MOUTH TWICE A  tablet 3     pantoprazole (PROTONIX) 20 MG EC tablet TAKE 1 TABLET (20 MG) BY MOUTH AT BEDTIME 90 tablet 3     PREVIDENT 5000 DRY MOUTH 1.1 % GEL topical gel Take by mouth daily  3     Pseudoeph-Doxylamine-DM-APAP (NYQUIL PO) Take by mouth as needed       simvastatin (ZOCOR) 10 MG  tablet Take 1 tablet (10 mg) by mouth daily 90 tablet 3     warfarin ANTICOAGULANT (COUMADIN) 2.5 MG tablet TAKE 1.25 MG (1/2 TABLET) MON/FRI AND 2.5 MG (1 TABLET) ALL OTHER DAYS 72 tablet 1       ALLERGIES:    Allergies   Allergen Reactions     No Known Drug Allergy        PAST MEDICAL HISTORY:   Past Medical History:   Diagnosis Date     Antiphospholipid antibody syndrome (H) 2005    Ravi's     Atrial fibrillation (H) 4/2011     Cirrhosis (H)      CKD (chronic kidney disease) stage 2, GFR 60-89 ml/min      Diabetes mellitus type II     steroid induced     DJD (degenerative joint disease) of knee     SHAWN, worse on right     DVT (deep venous thrombosis) (H)      ED (erectile dysfunction)      Gallbladder polyp 5/2010     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     Hemochromatosis      Hodgkin's disease NOS     5 cycles of ABVD in 2011     HTN (hypertension)      Hyperlipidaemia LDL goal <100      Multiple thyroid nodules     benign      Myeloproliferative disorder (H)     atypical, [facility] [place]     PE (pulmonary embolism) 11/2004     Polyneuropathy      Primary pulmonary hypertension (H)         PAST SURGICAL HISTORY:   Past Surgical History:   Procedure Laterality Date     ANESTHESIA CARDIOVERSION  4/21/2011    Procedure:ANESTHESIA CARDIOVERSION; Surgeon:GENERIC ANESTHESIA PROVIDER; Location:[facility] OR     ARTHROSCOPY KNEE RT/LT      LT     CATARACT IOL, RT/LT  2017     COLONOSCOPY  10/16/2012    Procedure: COLONOSCOPY;  Colonoscopy, screening;  Surgeon: Reg Feliciano MD;  Location: [facility] OR     H ABLATION FOCAL AFIB  3/5/2013    PVI     H ABLATION FOCAL AFIB  01/01/2018     HC BONE MARROW/STEM TRANSPLANT ALLOGENIC  5/8/2007     INSERT PORT VASCULAR ACCESS       JOINT REPLACEMTN, KNEE RT/LT  3/28/12    SHAWN     VASECTOMY  1990       HABITS/SOCIAL HISTORY:    Social History     Tobacco Use     Smoking status: Never Smoker     Smokeless tobacco: Never Used   Substance Use Topics     Alcohol use: No         FAMILY HISTORY:     Family History   Problem Relation Age of Onset     Hypertension Mother      Cerebrovascular Disease Mother      Breast Cancer Mother      Arrhythmia Brother      Thyroid Cancer Daughter      Bipolar Disorder Daughter      Arrhythmia Sister      Alzheimer Disease Father      Diabetes Paternal Aunt      Gastrointestinal Disease Maternal Grandmother         colitis      Thyroid Disease Sister      C.A.D. No family hx of        REVIEW OF SYSTEMS:  The patient completed a comprehensive 11 point review of systems (below), which was reviewed. Positives are as noted below; pertinent findings are as noted in the HPI.     Patient Supplied Answers to Review of Systems  [facility] ROS 8/29/2020   Constitutional Weight loss   Neurology Dizzy spells   Ears, Nose, Throat Hearing loss, Sore throat, Trouble swallowing, Hoarseness, Coughing up blood   Gastrointestinal/Genitourinary Heartburn/indigestion       PHYSICAL EXAMINATION:  General: The patient was alert and conversant, and in no acute distress.    Eyes: EOM normal, Conjunctiva and lids normal.  Nose: Grossly unremarkable; no  apparent bleeding.   Oral cavity/oropharynx: No visible anterior masses or lesions. Tongue mobility intact and symmetric.  Ears: Normal auricles bilaterally.  Neck: No appreciable cervical masses. Mildly limited neck extension and rotation.   Resp: Breathing comfortably, no stridor or stertor.  Neuro: Symmetric facial function. Other cranial nerves as documented above.  Psych: Normal affect, pleasant and cooperative. Possible mild psychomotor retardation, difficult to tell whether this is attributable to video lag.  Voice/speech: Mild to moderate dysphonia characterized by breathiness and poor respiratory support. Pitch glide: intact. MPT: wnl. Backwards resonance.  Extremities: No cyanosis, clubbing, or edema of the upper extremities.    Speech pathologist present: Yash Vidales MM, MA, CCC-SLP     Intake scores  Total Score for Last Patient-Answered  VHI Questionnaire  No flowsheet data found.  Total Score for Last Patient-Answered EAT Questionnaire  No flowsheet data found.  Total Score for Last Patient-Answered CSI Questionnaire  No flowsheet data found.    PROCEDURE: Deferred; telemedicine    LABS:    IMAGING/RESULTS reviewed, with pertinent report excerpts below:  CT Neck with contrast 11/17/2017  Impression:   1. No evidence of recurrent lymphoma in the neck.  2. Complete resolution of previously noted paranasal sinus  opacifications.    IMPRESSION AND PLAN:   Haresh Rock presents with multiple concerns including dysphonia, dysphagia, chronic cough/throat-clearing, blood-tinged sputum, and reflux/heartburn, in the context of a complex past medical history including myelodysplastic disorder status post stem cell transplant, Hodgkin's, hemochromatosis, PE, and cardiac arrythmias.    Recommendations:  1) I recommended a video fluoro swallow study with esophagram for further evaluation of swallowing. I encouraged immediate adoption of any strategies and/or exercises learned at that evaluation. He was comfortable proceeding with this.    2) He understands that we also need to complete a laryngoscopy/stroboscopy in the office. We also discussed that blood-tinged sputum can result from other etiologies as well (e.g. Gastroenterology/Pulmonary) and he plans to discuss this with his other providers as well.    3) We also recommended voice-focused speech therapy given difficulty with communication and social interaction, with goals including improving laryngeal efficiency and improving respiratory/phonatory coordination. He would like to put this on the back burner for now.     4) If heartburn persists despite pantoprazole, may benefit from Gastroenterology referral as well. He would like to proceed with this.    5) Today there were aspects of his presentation that seemed potentially consistent with Parkinson-type changes, but he reports no neurological concerns  other than his vocal weakness and did not wish to proceed with further evaluation. Therefore we did not immediately pursue this further, but if it there is any progression I asked him to let us know. We will also have more information once his swallow evaluation is completed.    6) Given his report of blood-tinged sputum and history of lymphoma we also discussed consideration of a CT Neck with contrast, which he would like to complete. I will connect with Dr Portillo at the patient's request regarding a potential need for CT chest/abdomen/pelvis at the same time.      He is comfortable with the plan. I appreciate the opportunity to participate in the care of this pleasant patient.       Video End Time: 9:57 AM    Originating Location (pt. Location): Home    Distant Location (provider location):  [facility]     Platform used for Video Visit: Good Mitchell MD

## 2020-09-02 NOTE — TELEPHONE ENCOUNTER
.[facility] called requesting flecainide and  Metoprolol to be faxed to mail order 90 day supply.      Signed Prescriptions:                        Disp   Refills    metoprolol tartrate (LOPRESSOR) 25 MG tabl*180 ta*1        Sig: Take 1 tablet (25 mg) by mouth 2 times daily  Authorizing Provider: TOM TODD  Ordering User: PETRONA SINHA    flecainide (TAMBOCOR) 50 MG tablet         180 ta*1        Sig: Take 1 tablet (50 mg) by mouth 2 times daily  Authorizing Provider: TOM TODD  Ordering User: PETRONA SINHA       Nerve Block    Date/Time: 9/2/2020 7:48 AM  Performed by: Andre Soriano CRNA  Authorized by: Andre Soriano 

## 2020-09-03 ENCOUNTER — PATIENT OUTREACH (OUTPATIENT)
Dept: GASTROENTEROLOGY | Facility: CLINIC | Age: 70
End: 2020-09-03

## 2020-09-03 ENCOUNTER — TELEPHONE (OUTPATIENT)
Dept: OTOLARYNGOLOGY | Facility: CLINIC | Age: 70
End: 2020-09-03

## 2020-09-03 NOTE — TELEPHONE ENCOUNTER
LVM for pt to call and schedule video swallow study ordered by provider.    Left [facility] number for scheduling.

## 2020-09-04 ENCOUNTER — OFFICE VISIT (OUTPATIENT)
Dept: OTOLARYNGOLOGY | Facility: CLINIC | Age: 70
End: 2020-09-04
Payer: COMMERCIAL

## 2020-09-04 VITALS
BODY MASS INDEX: 24.65 KG/M2 | WEIGHT: 182 LBS | HEIGHT: 72 IN | OXYGEN SATURATION: 99 % | TEMPERATURE: 97.4 F | HEART RATE: 63 BPM

## 2020-09-04 DIAGNOSIS — C81.99 HODGKIN'S DISEASE OF EXTRANODAL OR SOLID ORGAN SITE (H): ICD-10-CM

## 2020-09-04 DIAGNOSIS — R13.10 DYSPHAGIA, UNSPECIFIED TYPE: Primary | ICD-10-CM

## 2020-09-04 DIAGNOSIS — R09.89 CHRONIC THROAT CLEARING: ICD-10-CM

## 2020-09-04 DIAGNOSIS — R49.0 DYSPHONIA: ICD-10-CM

## 2020-09-04 ASSESSMENT — MIFFLIN-ST. JEOR: SCORE: 1613.55

## 2020-09-04 ASSESSMENT — PAIN SCALES - GENERAL: PAINLEVEL: NO PAIN (0)

## 2020-09-04 NOTE — NURSING NOTE
Chief Complaint   Patient presents with     RECHECK     Follow up scope exam     Pulse 63, temperature 97.4  F (36.3  C), temperature source Temporal, height 1.829 m (6'), weight 82.6 kg (182 lb), SpO2 99 %.    .  Yen Hunter, EMT

## 2020-09-04 NOTE — TELEPHONE ENCOUNTER
RECORDS RECEIVED FROM: [facility]- Dr. Cindy Mitchell   DATE RECEIVED: 9/11/2020   NOTES STATUS DETAILS   OFFICE NOTE from referring provider Internal 8/31/2020 Office visit with Dr. Mitchell    OFFICE NOTE from other specialist Internal 8/31/2020 Office visit with Daniel Vidales, MADELYN ([facility])    DISCHARGE SUMMARY from hospital N/A    OPERATIVE REPORT Internal    MEDICATION LIST Internal         ENDOSCOPY  Internal EGD: 6/19/09, 1/13/09, 6/19/17   COLONOSCOPY Internal 10/6/12   ERCP N/A    EUS N/A    STOOL TESTING N/A    PERTINENT LABS Internal    PATHOLOGY REPORTS (RELATED) N/A    IMAGING (CT, MRI, EGD) Internal CT Chest Abdomen Pelvis: 11/21/18, 5/16/16, 11/16/15  CT Neck: 11/17/17, 5/16/16, 11/16/15  US Head Neck: 3/29/17     REFERRAL INFORMATION    Date referral was placed:    Date all records received:    Date records were scanned into EPIC:    Date records were sent to Provider to review:    Date and recommendation received from provider:  LETTER SENT  SCHEDULE APPOINTMENT   Date patient was contacted to schedule:

## 2020-09-04 NOTE — PROGRESS NOTES
Dear Colleague:    Haresh Rock recently returned for follow-up at the [facility]. My clinic note from our visit is enclosed below.  Speech recognition software may have been used in the documentation below; input is reviewed before signature to the best of my ability.     I appreciate the ongoing opportunity to participate in this patient's care.    Please feel free to contact me with any questions.      Sincerely yours,  Cindy Mitchell M.D., M.P.H.  , Laryngology  Director, [facility]  Otolaryngology- Head & Neck Surgery  [phone]            =====  HISTORY OF PRESENT ILLNESS:  Haresh Rock is a pleasant 70-year-old male with multiple concerns including dysphonia, dysphagia, chronic cough/throat-clearing, blood-tinged sputum, and reflux/heartburn, in the context of a complex past medical history including myelodysplastic disorder status post stem cell transplant, Hodgkin's, hemochromatosis, PE, and cardiac arrhythmias who returns in follow up today. He reports that his symptoms are a little better, but no major changes. He wonders if he has ear wax on the left.          MEDICATIONS:     Current Outpatient Medications   Medication Sig Dispense Refill     CENTRUM OR 1 TABLET DAILY       flecainide (TAMBOCOR) 50 MG tablet Take 1 tablet (50 mg) by mouth 2 times daily 180 tablet 3     folic acid (FOLVITE) 1 MG tablet Take 1 tablet (1,000 mcg) by mouth daily 90 tablet 3     metoprolol tartrate (LOPRESSOR) 25 MG tablet TAKE 1 TABLET BY MOUTH TWICE A  tablet 3     pantoprazole (PROTONIX) 20 MG EC tablet TAKE 1 TABLET (20 MG) BY MOUTH AT BEDTIME 90 tablet 3     PREVIDENT 5000 DRY MOUTH 1.1 % GEL topical gel Take by mouth daily  3     Pseudoeph-Doxylamine-DM-APAP (NYQUIL PO) Take by mouth as needed       simvastatin (ZOCOR) 10 MG tablet Take 1 tablet (10 mg) by mouth daily 90 tablet 3     warfarin ANTICOAGULANT (COUMADIN) 2.5 MG tablet TAKE 1.25 MG  (1/2 TABLET) MON/FRI AND 2.5 MG (1 TABLET) ALL OTHER DAYS 72 tablet 1       ALLERGIES:    Allergies   Allergen Reactions     No Known Drug Allergy        NEW PMH/PSH: None    REVIEW OF SYSTEMS:  The patient completed a comprehensive 11 point review of systems (below), which was reviewed. Positives are as noted below.  Patient Supplied Answers to Review of Systems   ENT ROS 8/29/2020   Constitutional Weight loss   Neurology Dizzy spells   Ears, Nose, Throat Hearing loss, Sore throat, Trouble swallowing, Hoarseness, Coughing up blood   Gastrointestinal/Genitourinary Heartburn/indigestion          PHYSICAL EXAM:  General: The patient was alert and conversant, and in no acute distress.    Oral cavity/oropharynx: No masses or lesions. Dentition unchanged since prior. Tongue mobility and palate elevation intact and symmetric. Mild palate tremor.  Ears: Bilateral TMs unremarkable; mild cerumen bilaterally, not obstructive. EACs unremarkable.  Neck: No palpable cervical lymphadenopathy, mild tenderness to palpation of the thyrohyoid space, which was narrow. No obvious thyroid abnormality.  Resp: Breathing comfortably, no stridor or stertor.  Neuro: Symmetric facial function. Other cranial nerve function as documented above. ?mild psychomotor retardation  Psych: Mildly flat affect, pleasant and cooperative.  Voice/speech: Mild to moderate dysphonia characterized by breathiness and backwards resonance.      Intake scores  Last 2 Scores for Patient-Answered VHI Questionnaire  VHI Total Score 9/3/2020   VHI Total Score 17      Last 2 Scores for Patient-Answered EAT Questionnaire  EAT Total Score 9/3/2020   EAT Total Score 22      Last 2 Scores for Patient-Answered CSI Questionnaire  CSI Total Score 9/3/2020   CSI Total Score 16         Procedure:   Flexible fiberoptic laryngoscopy and laryngovideostroboscopy  Indications: This procedure was warranted to evaluate the patient's laryngeal anatomy and function. Risks, benefits,  and alternatives were discussed.  Description: After written informed consent was obtained, a time-out was performed to confirm patient identity, procedure, and procedure site. Topical 3% lidocaine with 0.25% phenylephrine was applied to the nasal cavities. I performed the endoscopy and no complications were apparent. Continuous and stroboscopic light were utilized to assess routine phonation and variable frequency phonation.  Performed by: Cindy Mitchell MD MPH  SLP: NA  Findings: Normal nasopharynx with patent Eustachian tube openings bilaterally. Posterior pharyngeal wall with smooth prominence, nonobstructive. Normal base of tongue, valleculae, and epiglottis. Vocal fold mobility: right: normal; left: normal. Medial edges of the vocal folds: mildly bowed. No focal mucosal lesions were observed on the true vocal folds. Glissade produced appropriate elongation. There was mild to moderate supraglottic recruitment with connected speech. Mucosa of false vocal folds, aryepiglottic folds, piriform sinuses, and posterior glottis unremarkable. Significant piriform pooling R>L, improved but not eliminated with chin tuck and hard swallow. Airway was patent. Response to the therapy probes was good. No focal lesions on NBI.    The addition of stroboscopy allowed evaluation of the mucosal wave.   Amplitude: right: normal; left: normal. Symmetry: intermittent symmetry. Closure pattern: complete. Closure plane: at glottic level. Phase distribution: slightly open-phase predominant.                      IMPRESSION AND PLAN:   Haresh Rock returns with mild bilateral vocal fold bowing, piriform pooling, and a smooth posterior pharyngeal wall prominence. I did not observe any mucosal irregularities that would account for his report of intermittent blood-tinged secretions.    Recommendations:    1)  Plan for a CT scan of the neck with contrast to assess posterior pharyngeal prominence. It may be an osteophyte, but it is worth  assessing.    2)  We will also proceed with VFSS with esophagram and Gastroenterology evaluation as previously discussed.    3)  We will have a low threshold for Neurology evaluation given mild palatal tremor, backwards resonance, and mildly flat affect.    4)  We recommended that he consult with his other providers regarding the blood-tinged secretions if they persist.  No upper airway source identified today.    Timing of follow up will depend on results of the steps above. I appreciate the opportunity to participate in the care of this pleasant patient.

## 2020-09-04 NOTE — LETTER
9/4/2020      RE: Haresh Rock  [address] [place] [address]       Dear Colleague:    Haresh Rock recently returned for follow-up at the [facility]. My clinic note from our visit is enclosed below.  Speech recognition software may have been used in the documentation below; input is reviewed before signature to the best of my ability.     I appreciate the ongoing opportunity to participate in this patient's care.    Please feel free to contact me with any questions.      Sincerely yours,  Cindy Mitchell M.D., M.P.H.  , Laryngology  Director, [facility]  Otolaryngology- Head & Neck Surgery  [phone]            =====  HISTORY OF PRESENT ILLNESS:  Haresh Rock is a pleasant 70-year-old male with multiple concerns including dysphonia, dysphagia, chronic cough/throat-clearing, blood-tinged sputum, and reflux/heartburn, in the context of a complex past medical history including myelodysplastic disorder status post stem cell transplant, Hodgkin's, hemochromatosis, PE, and cardiac arrhythmias who returns in follow up today. He reports that his symptoms are a little better, but no major changes. He wonders if he has ear wax on the left.          MEDICATIONS:     Current Outpatient Medications   Medication Sig Dispense Refill     CENTRUM OR 1 TABLET DAILY       flecainide (TAMBOCOR) 50 MG tablet Take 1 tablet (50 mg) by mouth 2 times daily 180 tablet 3     folic acid (FOLVITE) 1 MG tablet Take 1 tablet (1,000 mcg) by mouth daily 90 tablet 3     metoprolol tartrate (LOPRESSOR) 25 MG tablet TAKE 1 TABLET BY MOUTH TWICE A  tablet 3     pantoprazole (PROTONIX) 20 MG EC tablet TAKE 1 TABLET (20 MG) BY MOUTH AT BEDTIME 90 tablet 3     PREVIDENT 5000 DRY MOUTH 1.1 % GEL topical gel Take by mouth daily  3     Pseudoeph-Doxylamine-DM-APAP (NYQUIL PO) Take by mouth as needed       simvastatin (ZOCOR) 10 MG tablet Take 1 tablet (10 mg) by mouth  daily 90 tablet 3     warfarin ANTICOAGULANT (COUMADIN) 2.5 MG tablet TAKE 1.25 MG (1/2 TABLET) MON/FRI AND 2.5 MG (1 TABLET) ALL OTHER DAYS 72 tablet 1       ALLERGIES:    Allergies   Allergen Reactions     No Known Drug Allergy        NEW PMH/PSH: None    REVIEW OF SYSTEMS:  The patient completed a comprehensive 11 point review of systems (below), which was reviewed. Positives are as noted below.  Patient Supplied Answers to Review of Systems   ENT ROS 8/29/2020   Constitutional Weight loss   Neurology Dizzy spells   Ears, Nose, Throat Hearing loss, Sore throat, Trouble swallowing, Hoarseness, Coughing up blood   Gastrointestinal/Genitourinary Heartburn/indigestion          PHYSICAL EXAM:  General: The patient was alert and conversant, and in no acute distress.    Oral cavity/oropharynx: No masses or lesions. Dentition unchanged since prior. Tongue mobility and palate elevation intact and symmetric. Mild palate tremor.  Ears: Bilateral TMs unremarkable; mild cerumen bilaterally, not obstructive. EACs unremarkable.  Neck: No palpable cervical lymphadenopathy, mild tenderness to palpation of the thyrohyoid space, which was narrow. No obvious thyroid abnormality.  Resp: Breathing comfortably, no stridor or stertor.  Neuro: Symmetric facial function. Other cranial nerve function as documented above. ?mild psychomotor retardation  Psych: Mildly flat affect, pleasant and cooperative.  Voice/speech: Mild to moderate dysphonia characterized by breathiness and backwards resonance.      Intake scores  Last 2 Scores for Patient-Answered VHI Questionnaire  VHI Total Score 9/3/2020   VHI Total Score 17      Last 2 Scores for Patient-Answered EAT Questionnaire  EAT Total Score 9/3/2020   EAT Total Score 22      Last 2 Scores for Patient-Answered CSI Questionnaire  CSI Total Score 9/3/2020   CSI Total Score 16         Procedure:   Flexible fiberoptic laryngoscopy and laryngovideostroboscopy  Indications: This procedure was  warranted to evaluate the patient's laryngeal anatomy and function. Risks, benefits, and alternatives were discussed.  Description: After written informed consent was obtained, a time-out was performed to confirm patient identity, procedure, and procedure site. Topical 3% lidocaine with 0.25% phenylephrine was applied to the nasal cavities. I performed the endoscopy and no complications were apparent. Continuous and stroboscopic light were utilized to assess routine phonation and variable frequency phonation.  Performed by: Cindy Mitchell MD MPH  SLP: NA  Findings: Normal nasopharynx with patent Eustachian tube openings bilaterally. Posterior pharyngeal wall with smooth prominence, nonobstructive. Normal base of tongue, valleculae, and epiglottis. Vocal fold mobility: right: normal; left: normal. Medial edges of the vocal folds: mildly bowed. No focal mucosal lesions were observed on the true vocal folds. Glissade produced appropriate elongation. There was mild to moderate supraglottic recruitment with connected speech. Mucosa of false vocal folds, aryepiglottic folds, piriform sinuses, and posterior glottis unremarkable. Significant piriform pooling R>L, improved but not eliminated with chin tuck and hard swallow. Airway was patent. Response to the therapy probes was good. No focal lesions on NBI.    The addition of stroboscopy allowed evaluation of the mucosal wave.   Amplitude: right: normal; left: normal. Symmetry: intermittent symmetry. Closure pattern: complete. Closure plane: at glottic level. Phase distribution: slightly open-phase predominant.                      IMPRESSION AND PLAN:   Haresh Rock returns with mild bilateral vocal fold bowing, piriform pooling, and a smooth posterior pharyngeal wall prominence. I did not observe any mucosal irregularities that would account for his report of intermittent blood-tinged secretions.    Recommendations:    1)  Plan for a CT scan of the neck with contrast  to assess posterior pharyngeal prominence. It may be an osteophyte, but it is worth assessing.    2)  We will also proceed with VFSS with esophagram and Gastroenterology evaluation as previously discussed.    3)  We will have a low threshold for Neurology evaluation given mild palatal tremor, backwards resonance, and mildly flat affect.    4)  We recommended that he consult with his other providers regarding the blood-tinged secretions if they persist.  No upper airway source identified today.    Timing of follow up will depend on results of the steps above. I appreciate the opportunity to participate in the care of this pleasant patient.     Cindy Mitchell MD

## 2020-09-04 NOTE — PATIENT INSTRUCTIONS
1.  You were seen in the ENT Clinic today by . If you have any questions or concerns after your appointment, please call [phone]. Press option #1 for scheduling related needs. Press option #3 for Nurse advice.    2.   has recommended the following:   - CT neck with contrast   - VFSS and esophogram as previously discussed   -GI consultation as previously discussed.    3.  Plan is to return to clinic after completion of testing      Cheyenne Carney LPN  [facility] Otolaryngology

## 2020-09-05 LAB — INR PPP: 1.8 (ref 0.9–1.1)

## 2020-09-08 ENCOUNTER — ANTICOAGULATION THERAPY VISIT (OUTPATIENT)
Dept: FAMILY MEDICINE | Facility: CLINIC | Age: 70
End: 2020-09-08

## 2020-09-08 DIAGNOSIS — Z79.01 LONG TERM CURRENT USE OF ANTICOAGULANT THERAPY: ICD-10-CM

## 2020-09-08 DIAGNOSIS — D68.61 ANTIPHOSPHOLIPID ANTIBODY SYNDROME (H): ICD-10-CM

## 2020-09-08 DIAGNOSIS — Z86.718 PERSONAL HISTORY OF DVT (DEEP VEIN THROMBOSIS): ICD-10-CM

## 2020-09-08 DIAGNOSIS — I48.91 ATRIAL FIBRILLATION, UNSPECIFIED TYPE (H): ICD-10-CM

## 2020-09-08 DIAGNOSIS — R13.10 DYSPHAGIA, UNSPECIFIED TYPE: Primary | ICD-10-CM

## 2020-09-08 DIAGNOSIS — I48.0 PAROXYSMAL ATRIAL FIBRILLATION (H): ICD-10-CM

## 2020-09-08 DIAGNOSIS — C81.99 HODGKIN'S DISEASE OF EXTRANODAL OR SOLID ORGAN SITE (H): ICD-10-CM

## 2020-09-08 DIAGNOSIS — E83.110 HEREDITARY HEMOCHROMATOSIS (H): ICD-10-CM

## 2020-09-08 NOTE — PROGRESS NOTES
ANTICOAGULATION MANAGEMENT     Patient Name:  Haresh Rock  Date:  2020    ASSESSMENT /SUBJECTIVE:    Saturday's INR result of 1.8 is subtherapeutic. Goal INR of 2.0-3.0      Warfarin dose taken: Warfarin taken as previously instructed    Diet: No new diet changes affecting INR    Medication changes/ interactions: No new medications/supplements affecting INR    Previous INR: Therapeutic     S/S of bleeding or thromboembolism: No    New injury or illness: No    Upcoming surgery, procedure or cardioversion: No    Additional findings: None      PLAN:    Spoke with Haresh regarding INR result and instructed:     Warfarin Dosing Instructions: Take 3.75 mg today only then continue your current warfarin dose of 1.25 mg every Mon/Fri; 2.5 mg AOD    Instructed patient to follow up no later than:   Patient to recheck with home meter    Education provided: Target INR goal and significance of current INR result, Importance of therapeutic range and Importance of following up for INR monitoring at instructed interval      Haresh verbalizes understanding and agrees to warfarin dosing plan.    Instructed to call the Anticoagulation Clinic for any changes, questions or concerns. (#[phone])        Karin Ramirez RN      OBJECTIVE:  Recent labs: (last 7 days)     20   INR 1.8*         No question data found.  Anticoagulation Summary  As of 2020    INR goal:   2.0-3.0   TTR:   92.2 % (1 y)   INR used for dosin.8! (2020)   Warfarin maintenance plan:   1.25 mg (2.5 mg x 0.5) every Mon, Fri; 2.5 mg (2.5 mg x 1) all other days   Full warfarin instructions:   : 3.75 mg; Otherwise 1.25 mg every Mon, Fri; 2.5 mg all other days   Weekly warfarin total:   15 mg   Plan last modified:   Laverne Ferguson RN (2018)   Next INR check:   2020   Priority:   Maintenance   Target end date:   Indefinite    Indications    Long-term (current) use of anticoagulants [Z79.01] [Z79.01]  Atrial fibrillation (H)  [I48.91]  Antiphospholipid antibody syndrome (H) [D68.61]  Personal history of DVT (deep vein thrombosis) [Z86.718]  Atrial fibrillation  unspecified type (H) [I48.91]             Anticoagulation Episode Summary     INR check location:       Preferred lab:   EXTERNAL LAB    Send INR reminders to:   CHELSEA CHILEL    Comments:   JESSICA okay to manage by exception      Anticoagulation Care Providers     Provider Role Specialty Phone number    Anya Nowak MD [facility] [phone]

## 2020-09-08 NOTE — PROGRESS NOTES
Fax received from [facility] with results dated 9/5/2020: 1.8    Laverne Ferguson RN - [facility] Anticoagulation [facility]

## 2020-09-10 ENCOUNTER — ANCILLARY PROCEDURE (OUTPATIENT)
Dept: CT IMAGING | Facility: CLINIC | Age: 70
End: 2020-09-10
Attending: OTOLARYNGOLOGY
Payer: COMMERCIAL

## 2020-09-10 DIAGNOSIS — R49.0 DYSPHONIA: ICD-10-CM

## 2020-09-10 DIAGNOSIS — E83.110 HEREDITARY HEMOCHROMATOSIS (H): ICD-10-CM

## 2020-09-10 DIAGNOSIS — R13.10 DYSPHAGIA, UNSPECIFIED TYPE: ICD-10-CM

## 2020-09-10 DIAGNOSIS — C81.99 HODGKIN'S DISEASE OF EXTRANODAL OR SOLID ORGAN SITE (H): ICD-10-CM

## 2020-09-10 LAB
CREAT BLD-MCNC: 1.4 MG/DL (ref 0.66–1.25)
GFR SERPL CREATININE-BSD FRML MDRD: 50 ML/MIN/{1.73_M2}

## 2020-09-10 RX ORDER — IOPAMIDOL 755 MG/ML
112 INJECTION, SOLUTION INTRAVASCULAR ONCE
Status: COMPLETED | OUTPATIENT
Start: 2020-09-10 | End: 2020-09-10

## 2020-09-10 RX ADMIN — IOPAMIDOL 112 ML: 755 INJECTION, SOLUTION INTRAVASCULAR at 15:36

## 2020-09-11 ENCOUNTER — PRE VISIT (OUTPATIENT)
Dept: GASTROENTEROLOGY | Facility: CLINIC | Age: 70
End: 2020-09-11

## 2020-09-11 ENCOUNTER — ANTICOAGULATION THERAPY VISIT (OUTPATIENT)
Dept: FAMILY MEDICINE | Facility: CLINIC | Age: 70
End: 2020-09-11

## 2020-09-11 ENCOUNTER — VIRTUAL VISIT (OUTPATIENT)
Dept: GASTROENTEROLOGY | Facility: CLINIC | Age: 70
End: 2020-09-11
Payer: COMMERCIAL

## 2020-09-11 VITALS — HEIGHT: 72 IN | WEIGHT: 180 LBS | BODY MASS INDEX: 24.38 KG/M2

## 2020-09-11 DIAGNOSIS — D68.61 ANTIPHOSPHOLIPID ANTIBODY SYNDROME (H): ICD-10-CM

## 2020-09-11 DIAGNOSIS — Z79.01 LONG TERM CURRENT USE OF ANTICOAGULANT THERAPY: ICD-10-CM

## 2020-09-11 DIAGNOSIS — Z86.718 PERSONAL HISTORY OF DVT (DEEP VEIN THROMBOSIS): ICD-10-CM

## 2020-09-11 DIAGNOSIS — I48.0 PAROXYSMAL ATRIAL FIBRILLATION (H): ICD-10-CM

## 2020-09-11 DIAGNOSIS — E83.110 HEREDITARY HEMOCHROMATOSIS (H): ICD-10-CM

## 2020-09-11 DIAGNOSIS — Z79.01 CHRONIC ANTICOAGULATION: ICD-10-CM

## 2020-09-11 DIAGNOSIS — R13.12 OROPHARYNGEAL DYSPHAGIA: Primary | ICD-10-CM

## 2020-09-11 DIAGNOSIS — D89.811 GRAFT-VERSUS-HOST DISEASE, CHRONIC (H): ICD-10-CM

## 2020-09-11 PROBLEM — I48.91 ATRIAL FIBRILLATION, UNSPECIFIED TYPE (H): Status: RESOLVED | Noted: 2020-07-27 | Resolved: 2020-09-11

## 2020-09-11 PROBLEM — I48.91 ATRIAL FIBRILLATION, UNSPECIFIED TYPE (H): Status: ACTIVE | Noted: 2020-07-27

## 2020-09-11 LAB — INR PPP: 2.7 (ref 0.9–1.1)

## 2020-09-11 ASSESSMENT — PAIN SCALES - GENERAL: PAINLEVEL: NO PAIN (0)

## 2020-09-11 ASSESSMENT — MIFFLIN-ST. JEOR: SCORE: 1604.47

## 2020-09-11 NOTE — PATIENT INSTRUCTIONS
It was a pleasure taking care of you today.  I've included a brief summary of our discussion and care plan from today's visit below.  Please review this information with your primary care provider.  _______________________________________________________________________    My recommendations are summarized as follows:  -- We will plan for an upper endoscopy (EGD). Somebody should be in contact to help set this up. Plan to hold your warfarin for 5 days prior to the procedure.  -- Agree with the tests already ordered by Dr. Mitchell (Esophagram and video swallow test)      Please call our nurse Nereida with any questions or concerns- [phone].  --    Return to GI Clinic in 3 months to review your progress.    _______________________________________________________________________    Who do I call with any questions after my visit?  Please be in touch if there are any further questions that arise following today's visit.  There are multiple ways to contact your gastroenterology care team.        During business hours, you may reach a Gastroenterology nurse at [phone] and choose option 3.         To schedule or reschedule an appointment, please call [phone].       You can always send a secure message through [employer].  [employer] messages are answered by your nurse or doctor typically within 24 hours.  Please allow extra time on weekends and holidays.        For urgent/emergent questions after business hours, you may reach the on-call GI Fellow by contacting the [facility] at [phone].     How will I get the results of any tests ordered?    You will receive all of your results.  If you have signed up for [employer], any tests ordered at your visit will be available to you after your physician reviews them.  Typically this takes 1-2 weeks.  If there are urgent results that require a change in your care plan, your physician or nurse will call you to discuss the next steps.      What is  [employer]?  [employer] is a secure way for you to access all of your healthcare records from the [facility].  It is a web based computer program, so you can sign on to it from any location.  It also allows you to send secure messages to your care team.  I recommend signing up for [employer] access if you have not already done so and are comfortable with using a computer.      How to I schedule a follow-up visit?  If you did not schedule a follow-up visit today, please call [phone] to schedule a follow-up office visit.        Sincerely,  Betzy Morrison PA-C

## 2020-09-11 NOTE — PROGRESS NOTES
ANTICOAGULATION MANAGEMENT     Patient Name:  Haresh Rock  Date:  2020    ASSESSMENT /SUBJECTIVE:    Today's INR result of 2.7 is therapeutic. Goal INR of 2.0-3.0      Warfarin dose taken: Warfarin taken as previously instructed    Diet: No new diet changes affecting INR    Medication changes/ interactions: No new medications/supplements affecting INR    Previous INR: Subtherapeutic     S/S of bleeding or thromboembolism: No    New injury or illness: No    Upcoming surgery, procedure or cardioversion: No    Additional findings: None      PLAN:    Spoke with Haresh regarding INR result and instructed:     Warfarin Dosing Instructions: Continue your current warfarin dose 1.25 mg on mon/fri and 2.5 mg all other days    Instructed patient to follow up no later than: 1 week  Patient to recheck with home meter    Education provided: None required      Haresh verbalizes understanding and agrees to warfarin dosing plan.    Instructed to call the Anticoagulation Clinic for any changes, questions or concerns. (#[phone])        Blanca Rodriguez RN      OBJECTIVE:  Recent labs: (last 7 days)     20   INR 1.8* 2.7*         No question data found.  Anticoagulation Summary  As of 2020    INR goal:   2.0-3.0   TTR:   91.9 % (1 y)   INR used for dosin.7 (2020)   Warfarin maintenance plan:   1.25 mg (2.5 mg x 0.5) every Mon, Fri; 2.5 mg (2.5 mg x 1) all other days   Full warfarin instructions:   1.25 mg every Mon, Fri; 2.5 mg all other days   Weekly warfarin total:   15 mg   Plan last modified:   Laverne Ferguson RN (2018)   Next INR check:   2020   Priority:   Maintenance   Target end date:   Indefinite    Indications    Long-term (current) use of anticoagulants [Z79.01] [Z79.01]  Atrial fibrillation (H) [I48.91]  Antiphospholipid antibody syndrome (H) [D68.61]  Personal history of DVT (deep vein thrombosis) [Z86.718]  Atrial fibrillation  unspecified type (H) (Resolved) [I48.91]              Anticoagulation Episode Summary     INR check location:       Preferred lab:   EXTERNAL LAB    Send INR reminders to:   CHELSEA CHILEL    Comments:   JESSICA okay to manage by exception      Anticoagulation Care Providers     Provider Role Specialty Phone number    Anya Nowak MD [facility] [phone]

## 2020-09-11 NOTE — NURSING NOTE
Chief Complaint   Patient presents with     Consult     Heartburn       Vitals:    09/11/20 0945   Weight: 81.6 kg (180 lb)   Height: 1.829 m (6')       Body mass index is 24.41 kg/m .      Belinda Mccallum LPN

## 2020-09-11 NOTE — PROGRESS NOTES
"Haresh Rock is a 70 year old male who is being evaluated via a billable video visit.    The patient has been notified of following:     \"This video visit will be conducted via a call between you and your physician/provider. We have found that certain health care needs can be provided without the need for an in-person physical exam.  This service lets us provide the care you need with a video conversation.  If a prescription is necessary we can send it directly to your pharmacy.  If lab work is needed we can place an order for that and you can then stop by our lab to have the test done at a later time.    Video visits are billed at different rates depending on your insurance coverage.  Please reach out to your insurance provider with any questions.    If during the course of the call the physician/provider feels a video visit is not appropriate, you will not be charged for this service.\"    Patient has given verbal consent for Video visit? Yes  How would you like to obtain your AVS? MyChart  If you are dropped from the video visit, the video invite should be resent to: Send to e-mail at: [email or web address]  Will anyone else be joining your video visit? No      During this virtual visit the patient is located in [place], patient verifies this as the location during the entirety of this visit.       Video-Visit Details    Type of service:  Video Visit    Video Start Time: 10:00am  Video End Time: 11am    Originating Location (pt. Location): [place]    Distant Location (provider location):  [facility]     Platform used for Video Visit: Good Morrison PA-C and Raul Sawyer DO            Gastroenterology Visit for: Haresh Rock 1950   MRN: [id number]     Reason for Visit:  chief complaint    Referred by: Dr. Mitchell / No address on file  Patient Care Team:  Anya Nowak MD as PCP - General (Family Practice)  Augusto Miller MD as Referring Physician (Internal " "Medicine)  Jesusita Mejia PA-C as Physician Assistant  Pino Sharma MD as MD (Internal Medicine)  Fabi Jimenez MD as MD (INTERNAL MEDICINE - ENDOCRINOLOGY, DIABETES & METABOLISM)  Bekah Matt MD as MD (Cardiology)  Bonnie Ford APRN CNP as Nurse Practitioner (Nurse Practitioner - Family)  Tina Mejia RN as Specialty Care Coordinator (Cardiology)  Shanelle Yeager RN as Specialty Care Coordinator (BMT - Adult)  Anya Nowak MD as Assigned PCP    History of Present Illness:   Haresh Rock is 70 year old male with complex past medical history including 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 Mitchell with a chief complaint of dysphagia, cough, throat clearing.  ---------------------------------------------------------------  Haresh Rock states that for several months, he has had a sensation of food \"getting caught\" in the back of his throat area. Initially reports it is getting a little worse, but upon further questioning reports it is stable. Symptoms are occurring to solids only, with difficulty with firm, drier textures multiple times per day, with dysphagia to softer textures at least several times per week. No dysphagia to liquids.No regurgitation or vomiting. He has been on pantoprazole 20 mg daily for many years with rare heartburn during the day, but now having nocturnal heartburn without sensation of reflux, regurgitation, at least 3-4 times per week. He will usually take Tums, which relieve his symptoms. Long history of early satiety, which is unchanged. No abdominal pain. He hast lost 25 lb in the last 6 months, without reported change in diet, activity, or intake. No " bowel changes, hematochezia, or melena. Reports chronic cough to clear throat since cancer treatments, which perhaps is more prominent now, productive of phlegm, without food particles. No odynophagia. No trouble initiating swallows, sensation of aspiration, coughing with swallowing.   ---------------------------------------------------------------     Remainder of 10-pt ROS otherwise is negative.     Family history of colon cancer: Pt believes grandmother may have had colon cancer, no other GI cancers reported  Wt Readings from Last 5 Encounters:   09/11/20 81.6 kg (180 lb)   09/04/20 82.6 kg (182 lb)   08/31/20 80.7 kg (178 lb)   07/13/20 85.3 kg (188 lb)   02/18/20 93.1 kg (205 lb 4.8 oz)        Today's Ellen Swallow Questionnaire (SSQ):    Brief Esophageal Dysphagia Questionnaire (BEDQ):  We would like to ask about your dysphagia symptoms over the past 30 days, 6 months, and 12 months.  Please think about your symptom experiences and choose the best answer.      Over the past 14 days, on average, how often have you had the following?  If your response falls between two categories, please make your best guess.  If you are unable to eat the type of food in the question, please check  Cannot eat this.    Cannot  Eat This 0  Rarely/ Never 1  Once or twice a month 2  1-2 times per week 3  3-5 times per week 4  Daily or almost daily 5  Several times per day   Trouble eating solid food (meat, bread, vegetables)          x   Trouble eating soft foods (yogurt, jello, pudding)        x     Trouble swallowing liquids       x        Pain while swallowing  --- x        Coughing or choking while swallowing foods or liquids --- x          SCORE: 8    Over the past 14 days, on average, how would you rate your discomfort or pain during swallowing? If you are unable to eat the type of food in the question, please check  Cannot eat this.    Cannot Eat This 0  None 1  Very Mild 2  Mild 3  Moderate 4  Moderately Severe    5  Severe    Eating solid food   (meat, bread, vegetables)  x        Eating soft foods   (yogurt, jello, pudding)  x        Drinking liquids  x          SCORE: 0    Approximately how many times in the past 12 months have you:   Had food stuck in your throat or esophagus for a period lasting longer than 30 minutes? no  Had to visit the emergency room because of food stuck in your throat or esophagus? no    Eckardt Score:   Score Dysphagia Regurgitation Retrosternal Pain Weight Loss (kg)   0 None None None None   1 Occasional Occasional Occasional <5   2 Daily Daily Daily 5-10   3 Each meal Each meal Each meal >10     SCORE: 6    Any hardcopy questionnaire completed by the patient regarding SSQ, BEDQ, or Eckardt Score have been scanned into the media section of Epic.     STUDIES & PROCEDURES:    EGD:   Date: 6/19/2009  Impression:-Ectatic vienes in the lower third of the esophagus.                        -Fudic polyps.                        - Hiatus hernia.                        - Normal duodenal bulb.                        - Normal 2nd part of the duodenum.   Pathology Report:    Colonoscopy:  Date: 10/2012  Impression: normal  Pathology Report:     EndoFLIP directed at the UES or LES (8cm (EF-325) balloon length or 16cm (EF-322) balloon length):   Date:  8cm balloon  Balloon inflation Balloon pressure CSA (mm^2) DI (mm^2/mmHg) Dmin (mm) Compliance   20 (ladmark ID)        30        40        50           16cm balloon  Balloon inflation Balloon pressure CSA (mm^2) DI (mm^2/mmHg) Dmin (mm) Compliance   30 (ladmark ID)        40        50        60        70           High Resolution Manometry:  Date:  Impression:    PH/Impedance:  Date:  Impression:     Bravo:  48 or 96hr  Date:  Impression:    CT soft tissue neck:  Date: 9/10/20  Impression: Normal CT evaluation of the larynx, without explanation  for dysphonia identified.    CT Chest/abdomen/pelvis:  9/10/20  1.  Prominent periaortic lymph nodes measuring up to 1.1 cm are  stable  compared to 2017 CT. No new lymphadenopathy.  2.  No acute abnormality in the chest, abdomen or pelvis.  3.  Incidental findings include cholelithiasis, diverticulosis and  moderate prostatomegaly.     Esophagram: PENDING  Date:  Impression:     Prior medical records were reviewed including, but not limited to, notes from referring providers, lab work, radiographic tests, and other diagnostic tests. Pertinent results were summarized above.     History     Past Medical History:   Diagnosis Date     Antiphospholipid antibody syndrome (H) 2005    Ravi's     Atrial fibrillation (H) 4/2011     Cirrhosis (H)      CKD (chronic kidney disease) stage 2, GFR 60-89 ml/min      Diabetes mellitus type II     steroid induced     DJD (degenerative joint disease) of knee     SHAWN, worse on right     DVT (deep venous thrombosis) (H)      ED (erectile dysfunction)      Gallbladder polyp 5/2010     Nmifp-Bnlald-Gfcb Disease 2007    BMT     Hemochromatosis      Hodgkin's disease NOS     5 cycles of ABVD in 2011     HTN (hypertension)      Hyperlipidaemia LDL goal <100      Multiple thyroid nodules     benign      Myeloproliferative disorder (H)     atypical, [facility] [place]     PE (pulmonary embolism) 11/2004     Polyneuropathy      Primary pulmonary hypertension (H)        Past Surgical History:   Procedure Laterality Date     ANESTHESIA CARDIOVERSION  4/21/2011    Procedure:ANESTHESIA CARDIOVERSION; Surgeon:GENERIC ANESTHESIA PROVIDER; Location:[facility] OR     ARTHROSCOPY KNEE RT/LT      LT     CATARACT IOL, RT/LT  2017     COLONOSCOPY  10/16/2012    Procedure: COLONOSCOPY;  Colonoscopy, screening;  Surgeon: Reg Feliciano MD;  Location: [facility] OR     H ABLATION FOCAL AFIB  3/5/2013    PVI     H ABLATION FOCAL AFIB  01/01/2018     HC BONE MARROW/STEM TRANSPLANT ALLOGENIC  5/8/2007     INSERT PORT VASCULAR ACCESS       JOINT REPLACEMTN, KNEE RT/LT  3/28/12    SHAWN     VASECTOMY  1990       Social History     Socioeconomic History     Marital  status:      Spouse name: Angelica     Number of children: 2     Years of education: 21     Highest education level: Not on file   Occupational History     Occupation:      Employer: [employer]     Employer: DISABLED   Social Needs     Financial resource strain: Not on file     Food insecurity     Worry: Not on file     Inability: Not on file     Transportation needs     Medical: Not on file     Non-medical: Not on file   Tobacco Use     Smoking status: Never Smoker     Smokeless tobacco: Never Used   Substance and Sexual Activity     Alcohol use: No     Drug use: No     Sexual activity: Not Currently     Partners: Female   Lifestyle     Physical activity     Days per week: Not on file     Minutes per session: Not on file     Stress: Not on file   Relationships     Social connections     Talks on phone: Not on file     Gets together: Not on file     Attends Oriental orthodox service: Not on file     Active member of club or organization: Not on file     Attends meetings of clubs or organizations: Not on file     Relationship status: Not on file     Intimate partner violence     Fear of current or ex partner: Not on file     Emotionally abused: Not on file     Physically abused: Not on file     Forced sexual activity: Not on file   Other Topics Concern     Parent/sibling w/ CABG, MI or angioplasty before 65F 55M? No   Social History Narrative     Not on file       Family History   Problem Relation Age of Onset     Hypertension Mother      Cerebrovascular Disease Mother      Breast Cancer Mother      Arrhythmia Brother      Thyroid Cancer Daughter      Bipolar Disorder Daughter      Arrhythmia Sister      Alzheimer Disease Father      Diabetes Paternal Aunt      Gastrointestinal Disease Maternal Grandmother         colitis      Thyroid Disease Sister      C.A.D. No family hx of        Medications and Allergies:     Outpatient Encounter Medications as of 9/11/2020   Medication Sig Dispense Refill     CENTRUM  OR 1 TABLET DAILY       flecainide (TAMBOCOR) 50 MG tablet Take 1 tablet (50 mg) by mouth 2 times daily 180 tablet 3     folic acid (FOLVITE) 1 MG tablet Take 1 tablet (1,000 mcg) by mouth daily 90 tablet 3     metoprolol tartrate (LOPRESSOR) 25 MG tablet TAKE 1 TABLET BY MOUTH TWICE A  tablet 3     pantoprazole (PROTONIX) 20 MG EC tablet TAKE 1 TABLET (20 MG) BY MOUTH AT BEDTIME 90 tablet 3     PREVIDENT 5000 DRY MOUTH 1.1 % GEL topical gel Take by mouth daily  3     simvastatin (ZOCOR) 10 MG tablet Take 1 tablet (10 mg) by mouth daily 90 tablet 3     warfarin ANTICOAGULANT (COUMADIN) 2.5 MG tablet TAKE 1.25 MG (1/2 TABLET) MON/FRI AND 2.5 MG (1 TABLET) ALL OTHER DAYS 72 tablet 1     Pseudoeph-Doxylamine-DM-APAP (NYQUIL PO) Take by mouth as needed       No facility-administered encounter medications on file as of 9/11/2020.         Allergies   Allergen Reactions     No Known Drug Allergy         Review of systems:  A full 10 point review of systems was obtained and was negative except for the pertinent positives and negatives stated within the HPI.    Objective Findings:   Physical Exam:    Video physical exam  General: Patient appears well in no acute distress. Normal body habitus.  Skin: No visualized rash or lesions on visualized skin  Eyes: EOMI, no erythema, sclera icterus or discharge noted  Resp: Appears to be breathing comfortably without accessory muscle usage, speaking in full sentences, no cough  MSK: Appears to have normal range of motion based on visualized movements  Neurologic: No apparent tremors, facial movements symmetric  Psych: affect normal, alert and oriented    The rest of a comprehensive physical examination is deferred due to PHE (public health emergency) video restrictions         Labs, Radiology, Pathology     Lab Results   Component Value Date    WBC 7.7 07/20/2020    HGB 16.5 07/20/2020    HCT 45.8 07/20/2020     (L) 07/20/2020    CHOL 121 08/20/2020    TRIG 131 08/20/2020     HDL 31 (L) 08/20/2020    ALT 19 08/05/2020    AST 29 08/05/2020     08/05/2020    BUN 20 08/05/2020    CO2 29 08/05/2020    TSH 2.50 07/20/2020    INR 2.7 (A) 09/11/2020        Patient Active Problem List    Diagnosis Date Noted     Atrial fibrillation (H) 03/04/2013     Priority: High     Hyperlipidemia with target LDL less than 100 09/11/2012     Priority: High     Status post bone marrow transplant (H) 08/29/2012     Priority: High     Polyneuropathy      Priority: High     Tgooj-dwaqgc-zotl disease, chronic (H) 05/21/2011     Priority: High     Antiphospholipid antibody syndrome (H)      Priority: High     Ravi's       Hemochromatosis 06/02/2010     Priority: High     Oropharyngeal dysphagia 09/12/2020     Priority: Medium     Hereditary hemochromatosis (H) 07/06/2020     Priority: Medium     Primary pulmonary hypertension (H)      Priority: Medium     History of Hodgkin's lymphoma 03/24/2017     Priority: Medium     History of irradiation, presenting hazards to health 03/24/2017     Priority: Medium     Type 2 diabetes mellitus with stage 2 chronic kidney disease (H) 09/20/2016     Priority: Medium     Renal hypertension 09/20/2016     Priority: Medium     Thyroid nodule 05/17/2016     Priority: Medium     Long-term (current) use of anticoagulants [Z79.01] 04/19/2016     Priority: Medium     Chronic rhinitis 07/09/2014     Priority: Medium     Gallstones without obstruction of gallbladder 07/09/2014     Priority: Medium     Personal history of DVT (deep vein thrombosis) 05/30/2013     Priority: Medium     ED (erectile dysfunction)      Priority: Low     Status post total knee replacements 04/24/2012     Priority: Low     Dr. Eber Toth, [facility]       Advanced directives, counseling/discussion 03/21/2012     Priority: Low     Living will on file 1/11/2018        Assessment and Plan   Assessment:    Haresh Rock is 70 year old male with complex past medical history including atypical  myeloproliferative disorder s/p non-myeloablative alloSCT (2007), Hodgkin lymphoma s/p chemotherapy with ABVD (2011), chronic GVHD, hemochromatosis, pulmonary emboli, cardiac arrhythmia s/p ablation on chronic Warfarin, HTN, CKD, who is presenting as a new patient in consultation at the request of Dr. Mitchell with a chief complaint of dysphagia, cough, throat clearing.    Haresh is reporting several month history of stable to possibly progressive dysphagia to solids, associated with 25 pound weight loss, dysphonia, cough without sensation of aspiration, and new onset nocturnal heartburn, responsive to Tums.  CT neck performed yesterday is grossly unremarkable. A CT CAP was also performed, with formal reading as noted above. (This report was not finalized at the time of the telemedicine visit). Symptoms are concerning for a mechanical obstruction. Differential includes malignancy, reflux esophagitis, stricture, web, cricopharyngeal bar.     Plan:  -- EGD with possible dilation in the the near future following barium esophagram and PAC evaluation  -- Would recommend holding Warfarin for 5 days prior to procedure given possible dilation during EGD. Confirmed with Dr. Nowak (PCP) and Dr. Torres (Heme/onc) that no bridging is necessary.  -- Agree with modified barium swallow and esophagram, as ordered by Dr. Mitchell    Follow up plan:   Return to clinic 3 months and as needed.    The risks and benefits of my recommendations, as well as other treatment options were discussed with the patient and any available family today. All questions were answered.     o Follow up: As planned above. Today, I personally spent >30 minutes in direct face to face time with the patient, of which greater than 50% of the time was spent in patient education and counseling as described above. 10 minutes were spent on indirect care associated with the patient's consultation.    The patient verbalized understanding of the plan and was  appreciative for the time spent and information provided during the office visit.     Betzy Morrison PA-C     Documentation assisted by voice recognition and documentation system.'      I spoke with and evaluated the patient, participating in the key portions of the service. I reviewed Betzy Morrison's (PA) note. I agree with the Betzy Morrison's findings and plan. In summary the patient is a 70 year old male with a significant past medical history on warfarin presenting with dysphagia and new symptoms of heartburn. Fortunately he has undergone CT imagine of the neck/chest/abdomen/pelvis with no obvious concerning findings that can be linked to his esophageal function. He describes the location of his dysphagia as oropharyngeal and is pending a barium and modified barium esophagram to evaluate for gross obstruction/motility abnormalities/penetration/aspiration. He was strongly encouraged to obtain these tests as planned. Because of the alarm symptoms in a 71 yo male It is very reasonable to pursue upper endoscopy with biopsy and possible dilation. We have confirmed with the patient's PCP that he is able to hold his warfarin for 5 days prior to the EGD with possible dilation. At the time of EGD biopsies will be obtained in the esophagus both distal and proximal. If there is reason to biopsy the stomach and small bowel this will be performed based on findings at that time.  Because of his complex medical history including anticoagulation use he will require a PAC visit prior to endoscopy. If there is not a web/stricture/bar/malignancy/diverticulum or some other physical/anatomical/histological reason for his symptoms we will pursue high resolution esophageal manometry. Pharyngeal manometry may be an option as well at the time of high resolution esophageal manometry.     We discussed the risks and benefits of pursuing upper endoscopy. The benefits outweigh the potential risks however the risks, including but not  limited to: bleeding, infection, perforation, the need for surgical intervention, hospitalization, and in rare instances death were discussed and all questions were answered.      Raul Sawyer DO   of Medicine  Division of Gastroenterology, Hepatology, and Nutrition  [facility]

## 2020-09-11 NOTE — LETTER
"    9/11/2020         RE: Haresh Rock  [address] [place] [address]        Dear Colleague,    Thank you for referring your patient, Haresh Rock, to the [facility]. Please see a copy of my visit note below.    Haresh Rock is a 70 year old male who is being evaluated via a billable video visit.    The patient has been notified of following:     \"This video visit will be conducted via a call between you and your physician/provider. We have found that certain health care needs can be provided without the need for an in-person physical exam.  This service lets us provide the care you need with a video conversation.  If a prescription is necessary we can send it directly to your pharmacy.  If lab work is needed we can place an order for that and you can then stop by our lab to have the test done at a later time.    Video visits are billed at different rates depending on your insurance coverage.  Please reach out to your insurance provider with any questions.    If during the course of the call the physician/provider feels a video visit is not appropriate, you will not be charged for this service.\"    Patient has given verbal consent for Video visit? Yes  How would you like to obtain your AVS? MyChart  If you are dropped from the video visit, the video invite should be resent to: Send to e-mail at: [email or web address]  Will anyone else be joining your video visit? No      During this virtual visit the patient is located in [place], patient verifies this as the location during the entirety of this visit.       Video-Visit Details    Type of service:  Video Visit    Video Start Time: 10:00am  Video End Time: 11am    Originating Location (pt. Location): Home    Distant Location (provider location):  [facility]     Platform used for Video Visit: Good Morrison PA-C and Raul Sawyer DO            Gastroenterology Visit for: Haresh Rock " "1950   MRN: [id number]     Reason for Visit:  chief complaint    Referred by: Dr. Mitchell / No address on file  Patient Care Team:  Anya Nowak MD as PCP - General (Family Practice)  Augusto Miller MD as Referring Physician (Internal Medicine)  Jesusita Mejia PA-C as Physician Assistant  Pino Sharma MD as MD (Internal Medicine)  Fabi Jimenez MD as MD (INTERNAL MEDICINE - ENDOCRINOLOGY, DIABETES & METABOLISM)  Bekah Matt MD as MD (Cardiology)  Bonnie Ford APRN CNP as Nurse Practitioner (Nurse Practitioner - Family)  Tina Mejia RN as Specialty Care Coordinator (Cardiology)  Shanelle Yeager RN as Specialty Care Coordinator (BMT - Adult)  Anya Nowak MD as Assigned PCP    History of Present Illness:   Haresh Rock is 70 year old male with complex past medical history including 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 Mitchell with a chief complaint of dysphagia, cough, throat clearing.  ---------------------------------------------------------------  Haresh Rock states that for several months, he has had a sensation of food \"getting caught\" in the back of his throat area. Initially reports it is getting a little worse, but upon further questioning reports it is stable. Symptoms are occurring to solids only, with difficulty with firm, drier textures multiple times per day, with dysphagia to softer textures at least several times per week. No dysphagia to liquids.No regurgitation or vomiting. He has been on pantoprazole 20 mg daily for many years with rare heartburn during the day, but now having nocturnal heartburn without sensation of reflux, " regurgitation, at least 3-4 times per week. He will usually take Tums, which relieve his symptoms. Long history of early satiety, which is unchanged. No abdominal pain. He hast lost 25 lb in the last 6 months, without reported change in diet, activity, or intake. No bowel changes, hematochezia, or melena. Reports chronic cough to clear throat since cancer treatments, which perhaps is more prominent now, productive of phlegm, without food particles. No odynophagia. No trouble initiating swallows, sensation of aspiration, coughing with swallowing.   ---------------------------------------------------------------     Remainder of 10-pt ROS otherwise is negative.     Family history of colon cancer: Pt believes grandmother may have had colon cancer, no other GI cancers reported  Wt Readings from Last 5 Encounters:   09/11/20 81.6 kg (180 lb)   09/04/20 82.6 kg (182 lb)   08/31/20 80.7 kg (178 lb)   07/13/20 85.3 kg (188 lb)   02/18/20 93.1 kg (205 lb 4.8 oz)        Today's Ellen Swallow Questionnaire (SSQ):    Brief Esophageal Dysphagia Questionnaire (BEDQ):  We would like to ask about your dysphagia symptoms over the past 30 days, 6 months, and 12 months.  Please think about your symptom experiences and choose the best answer.      Over the past 14 days, on average, how often have you had the following?  If your response falls between two categories, please make your best guess.  If you are unable to eat the type of food in the question, please check  Cannot eat this.    Cannot  Eat This 0  Rarely/ Never 1  Once or twice a month 2  1-2 times per week 3  3-5 times per week 4  Daily or almost daily 5  Several times per day   Trouble eating solid food (meat, bread, vegetables)          x   Trouble eating soft foods (yogurt, jello, pudding)        x     Trouble swallowing liquids       x        Pain while swallowing  --- x        Coughing or choking while swallowing foods or liquids --- x          SCORE: 8    Over the past  14 days, on average, how would you rate your discomfort or pain during swallowing? If you are unable to eat the type of food in the question, please check  Cannot eat this.    Cannot Eat This 0  None 1  Very Mild 2  Mild 3  Moderate 4  Moderately Severe    5  Severe   Eating solid food   (meat, bread, vegetables)  x        Eating soft foods   (yogurt, jello, pudding)  x        Drinking liquids  x          SCORE: 0    Approximately how many times in the past 12 months have you:   Had food stuck in your throat or esophagus for a period lasting longer than 30 minutes? no  Had to visit the emergency room because of food stuck in your throat or esophagus? no    Eckardt Score:   Score Dysphagia Regurgitation Retrosternal Pain Weight Loss (kg)   0 None None None None   1 Occasional Occasional Occasional <5   2 Daily Daily Daily 5-10   3 Each meal Each meal Each meal >10     SCORE: 6    Any hardcopy questionnaire completed by the patient regarding SSQ, BEDQ, or Eckardt Score have been scanned into the media section of Epic.     STUDIES & PROCEDURES:    EGD:   Date: 6/19/2009  Impression:-Ectatic vienes in the lower third of the esophagus.                        -Fudic polyps.                        - Hiatus hernia.                        - Normal duodenal bulb.                        - Normal 2nd part of the duodenum.   Pathology Report:    Colonoscopy:  Date: 10/2012  Impression: normal  Pathology Report:     EndoFLIP directed at the UES or LES (8cm (EF-325) balloon length or 16cm (EF-322) balloon length):   Date:  8cm balloon  Balloon inflation Balloon pressure CSA (mm^2) DI (mm^2/mmHg) Dmin (mm) Compliance   20 (ladmark ID)        30        40        50           16cm balloon  Balloon inflation Balloon pressure CSA (mm^2) DI (mm^2/mmHg) Dmin (mm) Compliance   30 (ladmark ID)        40        50        60        70           High Resolution Manometry:  Date:  Impression:    PH/Impedance:  Date:  Impression:     Bravo:  48  or 96hr  Date:  Impression:    CT soft tissue neck:  Date: 9/10/20  Impression: Normal CT evaluation of the larynx, without explanation  for dysphonia identified.    CT Chest/abdomen/pelvis:  9/10/20  1.  Prominent periaortic lymph nodes measuring up to 1.1 cm are stable  compared to 2017 CT. No new lymphadenopathy.  2.  No acute abnormality in the chest, abdomen or pelvis.  3.  Incidental findings include cholelithiasis, diverticulosis and  moderate prostatomegaly.     Esophagram: PENDING  Date:  Impression:     Prior medical records were reviewed including, but not limited to, notes from referring providers, lab work, radiographic tests, and other diagnostic tests. Pertinent results were summarized above.     History     Past Medical History:   Diagnosis Date     Antiphospholipid antibody syndrome (H) 2005    Ravi's     Atrial fibrillation (H) 4/2011     Cirrhosis (H)      CKD (chronic kidney disease) stage 2, GFR 60-89 ml/min      Diabetes mellitus type II     steroid induced     DJD (degenerative joint disease) of knee     SHAWN, worse on right     DVT (deep venous thrombosis) (H)      ED (erectile dysfunction)      Gallbladder polyp 5/2010     Ilnai-Rlvcab-Zxbr Disease 2007    BMT     Hemochromatosis      Hodgkin's disease NOS     5 cycles of ABVD in 2011     HTN (hypertension)      Hyperlipidaemia LDL goal <100      Multiple thyroid nodules     benign      Myeloproliferative disorder (H)     atypical, [facility]     PE (pulmonary embolism) 11/2004     Polyneuropathy      Primary pulmonary hypertension (H)        Past Surgical History:   Procedure Laterality Date     ANESTHESIA CARDIOVERSION  4/21/2011    Procedure:ANESTHESIA CARDIOVERSION; Surgeon:GENERIC ANESTHESIA PROVIDER; Location:[facility] OR     ARTHROSCOPY KNEE RT/LT      LT     CATARACT IOL, RT/LT  2017     COLONOSCOPY  10/16/2012    Procedure: COLONOSCOPY;  Colonoscopy, screening;  Surgeon: Reg Feliciano MD;  Location: [facility] OR     H ABLATION FOCAL AFIB   3/5/2013    PVI     H ABLATION FOCAL AFIB  01/01/2018     HC BONE MARROW/STEM TRANSPLANT ALLOGENIC  5/8/2007     INSERT PORT VASCULAR ACCESS       JOINT REPLACEMTN, KNEE RT/LT  3/28/12    SHAWN     VASECTOMY  1990       Social History     Socioeconomic History     Marital status:      Spouse name: Angelica     Number of children: 2     Years of education: 21     Highest education level: Not on file   Occupational History     Occupation:      Employer: [employer]     Employer: DISABLED   Social Needs     Financial resource strain: Not on file     Food insecurity     Worry: Not on file     Inability: Not on file     Transportation needs     Medical: Not on file     Non-medical: Not on file   Tobacco Use     Smoking status: Never Smoker     Smokeless tobacco: Never Used   Substance and Sexual Activity     Alcohol use: No     Drug use: No     Sexual activity: Not Currently     Partners: Female   Lifestyle     Physical activity     Days per week: Not on file     Minutes per session: Not on file     Stress: Not on file   Relationships     Social connections     Talks on phone: Not on file     Gets together: Not on file     Attends Latter-day service: Not on file     Active member of club or organization: Not on file     Attends meetings of clubs or organizations: Not on file     Relationship status: Not on file     Intimate partner violence     Fear of current or ex partner: Not on file     Emotionally abused: Not on file     Physically abused: Not on file     Forced sexual activity: Not on file   Other Topics Concern     Parent/sibling w/ CABG, MI or angioplasty before 65F 55M? No   Social History Narrative     Not on file       Family History   Problem Relation Age of Onset     Hypertension Mother      Cerebrovascular Disease Mother      Breast Cancer Mother      Arrhythmia Brother      Thyroid Cancer Daughter      Bipolar Disorder Daughter      Arrhythmia Sister      Alzheimer Disease Father      Diabetes  Paternal Aunt      Gastrointestinal Disease Maternal Grandmother         colitis      Thyroid Disease Sister      FILI.A.D. No family hx of        Medications and Allergies:     Outpatient Encounter Medications as of 9/11/2020   Medication Sig Dispense Refill     CENTRUM OR 1 TABLET DAILY       flecainide (TAMBOCOR) 50 MG tablet Take 1 tablet (50 mg) by mouth 2 times daily 180 tablet 3     folic acid (FOLVITE) 1 MG tablet Take 1 tablet (1,000 mcg) by mouth daily 90 tablet 3     metoprolol tartrate (LOPRESSOR) 25 MG tablet TAKE 1 TABLET BY MOUTH TWICE A  tablet 3     pantoprazole (PROTONIX) 20 MG EC tablet TAKE 1 TABLET (20 MG) BY MOUTH AT BEDTIME 90 tablet 3     PREVIDENT 5000 DRY MOUTH 1.1 % GEL topical gel Take by mouth daily  3     simvastatin (ZOCOR) 10 MG tablet Take 1 tablet (10 mg) by mouth daily 90 tablet 3     warfarin ANTICOAGULANT (COUMADIN) 2.5 MG tablet TAKE 1.25 MG (1/2 TABLET) MON/FRI AND 2.5 MG (1 TABLET) ALL OTHER DAYS 72 tablet 1     Pseudoeph-Doxylamine-DM-APAP (NYQUIL PO) Take by mouth as needed       No facility-administered encounter medications on file as of 9/11/2020.         Allergies   Allergen Reactions     No Known Drug Allergy         Review of systems:  A full 10 point review of systems was obtained and was negative except for the pertinent positives and negatives stated within the HPI.    Objective Findings:   Physical Exam:    Video physical exam  General: Patient appears well in no acute distress. Normal body habitus.  Skin: No visualized rash or lesions on visualized skin  Eyes: EOMI, no erythema, sclera icterus or discharge noted  Resp: Appears to be breathing comfortably without accessory muscle usage, speaking in full sentences, no cough  MSK: Appears to have normal range of motion based on visualized movements  Neurologic: No apparent tremors, facial movements symmetric  Psych: affect normal, alert and oriented    The rest of a comprehensive physical examination is deferred  due to PHE (public health emergency) video restrictions         Labs, Radiology, Pathology     Lab Results   Component Value Date    WBC 7.7 07/20/2020    HGB 16.5 07/20/2020    HCT 45.8 07/20/2020     (L) 07/20/2020    CHOL 121 08/20/2020    TRIG 131 08/20/2020    HDL 31 (L) 08/20/2020    ALT 19 08/05/2020    AST 29 08/05/2020     08/05/2020    BUN 20 08/05/2020    CO2 29 08/05/2020    TSH 2.50 07/20/2020    INR 2.7 (A) 09/11/2020        Patient Active Problem List    Diagnosis Date Noted     Atrial fibrillation (H) 03/04/2013     Priority: High     Hyperlipidemia with target LDL less than 100 09/11/2012     Priority: High     Status post bone marrow transplant (H) 08/29/2012     Priority: High     Polyneuropathy      Priority: High     Jskhv-gnunzx-fybh disease, chronic (H) 05/21/2011     Priority: High     Antiphospholipid antibody syndrome (H)      Priority: High     Ravi's       Hemochromatosis 06/02/2010     Priority: High     Oropharyngeal dysphagia 09/12/2020     Priority: Medium     Hereditary hemochromatosis (H) 07/06/2020     Priority: Medium     Primary pulmonary hypertension (H)      Priority: Medium     History of Hodgkin's lymphoma 03/24/2017     Priority: Medium     History of irradiation, presenting hazards to health 03/24/2017     Priority: Medium     Type 2 diabetes mellitus with stage 2 chronic kidney disease (H) 09/20/2016     Priority: Medium     Renal hypertension 09/20/2016     Priority: Medium     Thyroid nodule 05/17/2016     Priority: Medium     Long-term (current) use of anticoagulants [Z79.01] 04/19/2016     Priority: Medium     Chronic rhinitis 07/09/2014     Priority: Medium     Gallstones without obstruction of gallbladder 07/09/2014     Priority: Medium     Personal history of DVT (deep vein thrombosis) 05/30/2013     Priority: Medium     ED (erectile dysfunction)      Priority: Low     Status post total knee replacements 04/24/2012     Priority: Low     Dr. 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 Mitchell with a chief complaint of dysphagia, cough, throat clearing.    Haresh is reporting several month history of stable to possibly progressive dysphagia to solids, associated with 25 pound weight loss, dysphonia, cough without sensation of aspiration, and new onset nocturnal heartburn, responsive to Tums.  CT neck performed yesterday is grossly unremarkable. A CT CAP was also performed, with formal reading as noted above. (This report was not finalized at the time of the telemedicine visit). Symptoms are concerning for a mechanical obstruction. Differential includes malignancy, reflux esophagitis, stricture, web, cricopharyngeal bar.     Plan:  -- EGD with possible dilation in the the near future following barium esophagram and PAC evaluation  -- Would recommend holding Warfarin for 5 days prior to procedure given possible dilation during EGD. Confirmed with Dr. Nowak (PCP) and Dr. Torres (Heme/onc) that no bridging is necessary.  -- Agree with modified barium swallow and esophagram, as ordered by Dr. Mitchell    Follow up plan:   Return to clinic 3 months and as needed.    The risks and benefits of my recommendations, as well as other treatment options were discussed with the patient and any available family today. All questions were answered.     o Follow up: As planned above. Today, I personally spent >30 minutes in direct face to face time with  the patient, of which greater than 50% of the time was spent in patient education and counseling as described above. 10 minutes were spent on indirect care associated with the patient's consultation.    The patient verbalized understanding of the plan and was appreciative for the time spent and information provided during the office visit.     Betzy Morrison PA-C     Documentation assisted by voice recognition and documentation system.'      I spoke with and evaluated the patient, participating in the key portions of the service. I reviewed Betzy Morrison's (PA) note. I agree with the Betzy Morrison's findings and plan. In summary the patient is a 70 year old male with a significant past medical history on warfarin presenting with dysphagia and new symptoms of heartburn. Fortunately he has undergone CT imagine of the neck/chest/abdomen/pelvis with no obvious concerning findings that can be linked to his esophageal function. He describes the location of his dysphagia as oropharyngeal and is pending a barium and modified barium esophagram to evaluate for gross obstruction/motility abnormalities/penetration/aspiration. He was strongly encouraged to obtain these tests as planned. Because of the alarm symptoms in a 71 yo male It is very reasonable to pursue upper endoscopy with biopsy and possible dilation. We have confirmed with the patient's PCP that he is able to hold his warfarin for 5 days prior to the EGD with possible dilation. At the time of EGD biopsies will be obtained in the esophagus both distal and proximal. If there is reason to biopsy the stomach and small bowel this will be performed based on findings at that time.  Because of his complex medical history including anticoagulation use he will require a PAC visit prior to endoscopy. If there is not a web/stricture/bar/malignancy/diverticulum or some other physical/anatomical/histological reason for his symptoms we will pursue high resolution esophageal  manometry. Pharyngeal manometry may be an option as well at the time of high resolution esophageal manometry.     We discussed the risks and benefits of pursuing upper endoscopy. The benefits outweigh the potential risks however the risks, including but not limited to: bleeding, infection, perforation, the need for surgical intervention, hospitalization, and in rare instances death were discussed and all questions were answered.      Raul Sawyer DO   of Medicine  Division of Gastroenterology, Hepatology, and Nutrition  [facility]

## 2020-09-12 PROBLEM — R13.12 OROPHARYNGEAL DYSPHAGIA: Status: ACTIVE | Noted: 2020-09-12

## 2020-09-16 ENCOUNTER — TELEPHONE (OUTPATIENT)
Dept: OTOLARYNGOLOGY | Facility: CLINIC | Age: 70
End: 2020-09-16

## 2020-09-16 NOTE — TELEPHONE ENCOUNTER
Spoke to pt in regards to results of his CT scan as per provider. Pt verbalized understanding of results and appreciative of call.

## 2020-09-18 ENCOUNTER — TRANSFERRED RECORDS (OUTPATIENT)
Dept: HEALTH INFORMATION MANAGEMENT | Facility: CLINIC | Age: 70
End: 2020-09-18

## 2020-09-18 ENCOUNTER — DOCUMENTATION ONLY (OUTPATIENT)
Dept: FAMILY MEDICINE | Facility: CLINIC | Age: 70
End: 2020-09-18

## 2020-09-18 DIAGNOSIS — I48.0 PAROXYSMAL ATRIAL FIBRILLATION (H): ICD-10-CM

## 2020-09-18 DIAGNOSIS — Z86.718 PERSONAL HISTORY OF DVT (DEEP VEIN THROMBOSIS): ICD-10-CM

## 2020-09-18 DIAGNOSIS — D68.61 ANTIPHOSPHOLIPID ANTIBODY SYNDROME (H): ICD-10-CM

## 2020-09-18 DIAGNOSIS — Z79.01 LONG TERM CURRENT USE OF ANTICOAGULANT THERAPY: ICD-10-CM

## 2020-09-18 LAB — INR PPP: 2.8 (ref 0.9–1.1)

## 2020-09-18 NOTE — PROGRESS NOTES
ANTICOAGULATION  MANAGEMENT-Patient Home Monitoring Result    Assessment     Therapeutic INR result of 2.8 . Goal range 2.0-3.0. Received via fax from [facility] home INR monitoring company.        Previous INR was therapeutic    Haresh was last contacted by phone:     Plan     Per home monitoring agreement with patient, patient was NOT contacted regarding therapeutic result today.  Patient is to continue current dose and continue to check INR with home monitor per protocol.  ?       OBJECTIVE    INR   Date Value Ref Range Status   2020 2.8 (A) 0.90 - 1.10 Final     Factor 2 Assay   Date Value Ref Range Status   2007 58 (L) 60 - 140 % Final     Comment:     (Note)  CALLED TO TAMARA(INTERV. RADIOLOGY)[id number],        ASSESSMENT / PLAN  No question data found.  Anticoagulation Summary  As of 2020    INR goal:   2.0-3.0   TTR:   91.9 % (1 y)   INR used for dosin.8 (2020)   Warfarin maintenance plan:   1.25 mg (2.5 mg x 0.5) every Mon, Fri; 2.5 mg (2.5 mg x 1) all other days   Full warfarin instructions:   1.25 mg every Mon, Fri; 2.5 mg all other days   Weekly warfarin total:   15 mg   Plan last modified:   Laverne Ferguson RN (2018)   Next INR check:   2020   Priority:   Maintenance   Target end date:   Indefinite    Indications    Long-term (current) use of anticoagulants [Z79.01] [Z79.01]  Atrial fibrillation (H) [I48.91]  Antiphospholipid antibody syndrome (H) [D68.61]  Personal history of DVT (deep vein thrombosis) [Z86.718]  Atrial fibrillation  unspecified type (H) (Resolved) [I48.91]             Anticoagulation Episode Summary     INR check location:       Preferred lab:   EXTERNAL LAB    Send INR reminders to:   CHELSEA CHILEL    Comments:   JESSICA rodas to manage by exception      Anticoagulation Care Providers     Provider Role Specialty Phone number    Anya Nowak MD Referring [facility] [phone]

## 2020-09-22 ENCOUNTER — ANCILLARY PROCEDURE (OUTPATIENT)
Dept: GENERAL RADIOLOGY | Facility: CLINIC | Age: 70
End: 2020-09-22
Attending: OTOLARYNGOLOGY
Payer: COMMERCIAL

## 2020-09-22 ENCOUNTER — THERAPY VISIT (OUTPATIENT)
Dept: SPEECH THERAPY | Facility: CLINIC | Age: 70
End: 2020-09-22
Attending: OTOLARYNGOLOGY
Payer: COMMERCIAL

## 2020-09-22 DIAGNOSIS — C81.99 HODGKIN'S DISEASE OF EXTRANODAL OR SOLID ORGAN SITE (H): ICD-10-CM

## 2020-09-22 DIAGNOSIS — R13.12 OROPHARYNGEAL DYSPHAGIA: Primary | ICD-10-CM

## 2020-09-22 DIAGNOSIS — E83.110 HEREDITARY HEMOCHROMATOSIS (H): ICD-10-CM

## 2020-09-22 DIAGNOSIS — R13.10 DYSPHAGIA: ICD-10-CM

## 2020-09-22 RX ORDER — BARIUM SULFATE 400 MG/ML
25 SUSPENSION ORAL ONCE
Status: COMPLETED | OUTPATIENT
Start: 2020-09-22 | End: 2020-09-22

## 2020-09-22 RX ORDER — BARIUM SULFATE 400 MG/ML
SUSPENSION ORAL ONCE
Status: COMPLETED | OUTPATIENT
Start: 2020-09-22 | End: 2020-09-22

## 2020-09-22 RX ADMIN — BARIUM SULFATE 10 ML: 400 SUSPENSION ORAL at 14:24

## 2020-09-22 RX ADMIN — BARIUM SULFATE 25 ML: 400 SUSPENSION ORAL at 14:23

## 2020-09-22 NOTE — DISCHARGE INSTRUCTIONS
"Videofluoroscopic Swallow Study Results    Problem Identified: Liquids are going \"down the wrong tube\" and you do not feel it. This means you don't spontaneously cough/throat clear to remove things from the airway. This is called silent aspiration.  This puts us at risk of an aspiration pneumonia. Your throat muscles are also weak and not able to push all the food/liquid into your food pipe, meaning there is food/liquid left in your throat after you swallow.     Diet Recommended: Thin liquids using \"swallow, cough, dry swallow\" strategy; soft moist foods (Dysphagia Diet Level 2)    Complete thorough oral cares (brushing teeth, tongue, gums) 20-30 minutes before you eat. This will help reduce the risk of a pneumonia    Swallowing Suggestions:  Sit upright at 90 degrees  Eat slowly  Chew carefully  Do not talk while chewing or swallowing  Check for oral residue after each bite  No straw  Small bites/sips    Supraglottic swallow strategy   - 1) Swallow bite or sip   - 2) Cough/throat clear   - 3) \"Dry\" swallow again    Smaller, more frequent meals  Alternate liquids and solids  Remain upright for 3 hours after intake    Additional information provided:  List of foods appropriate for Level 2 Dysphagia Diet    Resume swallowing exercises given to you from this summer    Follow up: I will send the results today to Dr. Mitchell. I would recommend you meet with Neurology based on these findings. We will help coordinate this.     Please call/email with questions:    JOSE LUIS [place] (YAMILE abdi, CCC-SLP   Speech Language Pathologist  [facility] Trained Vocologist   [facility]  Dept. of Otolaryngology  Department of Rehabilitation Services  [address] [place] [id number]  Email: [email or web address]   Pronouns: she/her/hers  Phone: [phone]    I will be in touch about follow up appointments after I talk to Dr. Mitchell              "

## 2020-09-23 DIAGNOSIS — T17.900A SILENT ASPIRATION: Primary | ICD-10-CM

## 2020-09-23 PROBLEM — E10.9 DIABETES MELLITUS TYPE 1 (H): Status: ACTIVE | Noted: 2020-09-23

## 2020-09-23 PROBLEM — D75.9 DISEASE OF BLOOD AND BLOOD FORMING ORGAN: Status: ACTIVE | Noted: 2020-09-23

## 2020-09-23 PROBLEM — I49.9 CARDIAC DYSRHYTHMIA: Status: ACTIVE | Noted: 2020-09-23

## 2020-09-23 RX ORDER — MULTIVIT WITH MINERALS/LUTEIN
1 TABLET ORAL DAILY
COMMUNITY
End: 2021-11-12

## 2020-09-23 RX ORDER — AMOXICILLIN 500 MG/1
CAPSULE ORAL
COMMUNITY
Start: 2020-09-21 | End: 2020-09-24

## 2020-09-23 NOTE — PROGRESS NOTES
VIDEO VISIT - used alternative video platform.     Date of Visit: September 24, 2020  Name: Haresh Rock  Date of Birth 1950  [address] [place] [id number] [phone] (M)  [phone] (H)  [email or web address]  Has mychart  proxy - sheyla Ramirez - son  Gemma - daughter  Sheyla Rock  [phone]    Assessment:  (F80.0) Articulation disorder. Hypophonia for the past 3-4 months. He has lost 20 lbs or so. There are no diet or eating changes.  No tremor. Right handed. Denies lack of arm swing. Has not had falls. He has had some slowness over and naps every day and is up after midnight and may get up at 7am. He has features of parkinsonism.    Has atrial fibrillation and has had 2 ablation attempts and has had benefit from flecainide for the past 2 years.   Heart is doing well and monitoring with apple watch  He is on metoprolol and flecainide.    Past history of hypotension    Anticoagulated    Hereditary hemochromatosis gene - gene that increases his risk and has not had problems with this. Had had phlebotomies x 2 in the past.     Has had a history of myeloproliferative disease - too many platelets and not enough hemoglobin. Had a transplant from Dr. 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  No problems since then.     Hodgkin's lymphoma in the past 2011 and had chemotherapy for this and followed with CT scan and reportedly this is gone.     Pulmonary hypertension in the past.     Reportedly had a history of pulmonary embolii - back in 2004. This was related to blood clotting problems.   May have had a DVT. Reportedly has had antiphospholipid antibody. He is on coumadin.     Gallstones but has not gallbladder removed.  Had a gallbladder attack x 1 and is not on actigall    Endo - cholesterol reportedly okay on simvastatin  Had diabetes in the past from prednisone and once weaned off prednisone in 2011 and had shock from low blood sugar and since then he has not had diabetes  issues. Had been checking blood sugars for a while. His A1c 6.3 was 2 months ago. May have a thyroid nodule.  TSH was normal.     He states he never was a drinker but has had liver problems which reportedly may have been due to iron accumulation in the liver.     Denies constipation and may have a bit of heartburn and taking tums as needed for 3 weeks.     Renal function.   May have stage 2 disease.     Prostate - denies problems. He has has nocturia couple times per night.   Denies elevated psa    Smell perception has been affected since chemotherapy in 2011 and his taste is affected as well.     Denies allergies or infections.     He denies talking in his sleep or snoring. He does not sleep in the same bed as his wife who sleeps elsewhere and she is using cpap.    He states he does not have significant memory problems. He  May forget things at times. He has not had problems with driving.     Daughter has bipolar. He denies psychiatric problems. Denies depression or anxiety. Thinks his kids inherited psych problems from his wife. Son with ocd, eating problem and some depression    msk - bilateral knee replacements. He states he does not have problems walking  He does not exercise or walk much. They have a 5 level house and is walking up stairs. He has computers all over and walking all over. He is not a touch  and has noticed that he has made more mistakes in typing. His handwriting is affected and worse over the years. He has some decreased size in handwriting.     Eye - left eye has been poor since age 2 due to a lazy eye - amblyopia  Right eye post cataract surgery had an artificial lens put in and does not wear glasses till the evening when his eyes are tired. His vision is relatively good except when he is tired.     Hearing. Feels he has wax in his left ear and partial problem with the right ear. It is variable problem. Has used ear wax removal.     He reports a neuropathy     Medications           "  Acetaminophen tylenol 500mg        Amoxicillin amoxil  For dentist       Flecainide tambocor 50mg 1   1    Folic acid folvite 1mg 1       Metoprolol tartrate lopressor 25mg 1   1    MVI centrum variable       Pantoprazole protonix 20mg    1    Prevident 5000 dry mouth using       Psuedoeph-doxylamine DM APAP nyquil rare       Simvastatin zocor    1    Warfarin anticoagulant coumadin 5 days - 1 tab; 1/2 tab other days                 Plan:    Dopamine scan - (datscan) ordered.     He will check in with his gastroenterologist about getting a Endoscopy to evaluate for weight loss.     Return to see in 1-3 months to review dopamine scan (datscan ) results.     Lamonte granted to his wife per his wishes.     Discussed big and loud therapy    Discussed parkinson as a probable diagnosis.     I have reviewed the note as documented above.  This accurately captures the substance of my conversation with the patient.  Patient contact time  60  minutes. Over 50% of this visit was spent in patient care and care coordination.     Time of visit 210-313pm    Ángel Hill MD      ------------------------------------------------------------------------------------------------------------------------------------------------------------------------    Video-Visit Details    The patient has been notified of following:     \"After a review of the patient s situation, this visit was changed from an in-person visit to a video visit to reduce the risk of COVID-19 exposure.   The patient is being evaluated via a billable video visit.\"    \"This video visit will be conducted via a call between you and your physician/provider. We have found that certain health care needs can be provided without the need for an in-person physical exam.  This service lets us provide the care you need with a video conversation.  If a prescription is necessary we can send it directly to your pharmacy.  If lab work is needed we can place an order for that and you can " "then stop by our lab to have the test done at a later time.    If during the course of the call the physician/provider feels a video visit is not appropriate, you will not be charged for this service.\"     Patient has given verbal consent for Video visit? Yes    Patient would like the video invitation sent by:     Type of service:  Video Visit    Video Start Time:     Video End Time (time video stopped):     Duration:  minutes - see above    Originating Location (pt. Location):     Distant Location (provider location):  [facility] NEUROLOGY     Mode of Communication:  Video Conference via [employer] (and if not possible then doximity)      Ángel Hill MD      --------------------------------------------------------------------------------------------------------------    Haresh Rock is a 70 year old male who is being evaluated via a billable video visit.      Charts reviewed  Consult from  Images reviewed        I have reviewed and updated the patient's Past Medical History, Social History, Family History and Medication List.    ALLERGIES  No known drug allergy    Lasts visit details if there was a last visit:                                             14 Review of systems  are negative except for   Patient Active Problem List   Diagnosis     Hemochromatosis     Antiphospholipid antibody syndrome (H)     Wlgjl-fjxwww-eler disease, chronic (H)     Advanced directives, counseling/discussion     Polyneuropathy     Status post total knee replacements     Status post bone marrow transplant (H)     Hyperlipidemia with target LDL less than 100     Atrial fibrillation (H)     Personal history of DVT (deep vein thrombosis)     Chronic rhinitis     Gallstones without obstruction of gallbladder     Long-term (current) use of anticoagulants [Z79.01]     Thyroid nodule     Type 2 diabetes mellitus with stage 2 chronic kidney disease (H)     Renal hypertension     History of Hodgkin's lymphoma     History of irradiation, " presenting hazards to health     Primary pulmonary hypertension (H)     Hereditary hemochromatosis (H)     Oropharyngeal dysphagia     Articulation disorder     Cardiac dysrhythmia     Disease of blood and blood forming organ     Personal history of PE (pulmonary embolism)     Complications of bone marrow transplant (H)     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)     Renal failure, chronic     Diabetes mellitus type 1 (H)     Hypotension     Essential hypertension     Hyperlipidemia     PAF (paroxysmal atrial fibrillation) (H)     Total knee replacement status        Allergies   Allergen Reactions     No Known Drug Allergy      Past Surgical History:   Procedure Laterality Date     ANESTHESIA CARDIOVERSION  4/21/2011    Procedure:ANESTHESIA CARDIOVERSION; Surgeon:GENERIC ANESTHESIA PROVIDER; Location:[facility] OR     ARTHROSCOPY KNEE RT/LT      LT     CATARACT IOL, RT/LT  2017     COLONOSCOPY  10/16/2012    Procedure: COLONOSCOPY;  Colonoscopy, screening;  Surgeon: Reg Feliciano MD;  Location: [facility] OR     H ABLATION FOCAL AFIB  3/5/2013    PVI     H ABLATION FOCAL AFIB  01/01/2018     HC BONE MARROW/STEM TRANSPLANT ALLOGENIC  5/8/2007     INSERT PORT VASCULAR ACCESS       JOINT REPLACEMTN, KNEE RT/LT  3/28/12    SHAWN     VASECTOMY  1990     Past Medical History:   Diagnosis Date     Antiphospholipid antibody syndrome (H) 2005    Ravi's     Articulation disorder 9/23/2020     Atrial fibrillation (H) 4/2011     Cirrhosis (H)      CKD (chronic kidney disease) stage 2, GFR 60-89 ml/min      Diabetes mellitus type II     steroid induced     DJD (degenerative joint disease) of knee     SHAWN, worse on right     DVT (deep venous thrombosis) (H)      ED (erectile dysfunction)      Gallbladder polyp 5/2010     Rjnpa-Zycbuo-Emem Disease 2007    BMT     Hemochromatosis      Hodgkin's disease NOS     5 cycles of ABVD in 2011     HTN (hypertension)      Hyperlipidaemia LDL goal <100      Multiple thyroid nodules     benign       Myeloproliferative disorder (H)     atypical, [facility]     PE (pulmonary embolism) 11/2004     Polyneuropathy      Primary pulmonary hypertension (H)      Social History     Socioeconomic History     Marital status:      Spouse name: Angelica     Number of children: 2     Years of education: 21     Highest education level: Not on file   Occupational History     Occupation:      Employer: [employer]     Employer: DISABLED   Social Needs     Financial resource strain: Not on file     Food insecurity     Worry: Not on file     Inability: Not on file     Transportation needs     Medical: Not on file     Non-medical: Not on file   Tobacco Use     Smoking status: Never Smoker     Smokeless tobacco: Never Used   Substance and Sexual Activity     Alcohol use: No     Drug use: No     Sexual activity: Not Currently     Partners: Female   Lifestyle     Physical activity     Days per week: Not on file     Minutes per session: Not on file     Stress: Not on file   Relationships     Social connections     Talks on phone: Not on file     Gets together: Not on file     Attends [employer] service: Not on file     Active member of club or organization: Not on file     Attends meetings of clubs or organizations: Not on file     Relationship status: Not on file     Intimate partner violence     Fear of current or ex partner: Not on file     Emotionally abused: Not on file     Physically abused: Not on file     Forced sexual activity: Not on file   Other Topics Concern     Parent/sibling w/ CABG, MI or angioplasty before 65F 55M? No   Social History Narrative     Not on file     Family History   Problem Relation Age of Onset     Hypertension Mother      Cerebrovascular Disease Mother      Breast Cancer Mother      Arrhythmia Brother      Thyroid Cancer Daughter      Bipolar Disorder Daughter      Arrhythmia Sister      Alzheimer Disease Father      Diabetes Paternal Aunt      Gastrointestinal Disease Maternal Grandmother          colitis      Thyroid Disease Sister      DA. No family hx of      Current Outpatient Medications   Medication Sig Dispense Refill     amoxicillin (AMOXIL) 500 MG capsule        CENTRUM OR 1 TABLET DAILY       flecainide (TAMBOCOR) 50 MG tablet Take 1 tablet (50 mg) by mouth 2 times daily 180 tablet 3     folic acid (FOLVITE) 1 MG tablet Take 1 tablet (1,000 mcg) by mouth daily 90 tablet 3     metoprolol tartrate (LOPRESSOR) 25 MG tablet TAKE 1 TABLET BY MOUTH TWICE A  tablet 3     pantoprazole (PROTONIX) 20 MG EC tablet TAKE 1 TABLET (20 MG) BY MOUTH AT BEDTIME 90 tablet 3     PREVIDENT 5000 DRY MOUTH 1.1 % GEL topical gel Take by mouth daily  3     Pseudoeph-Doxylamine-DM-APAP (NYQUIL PO) Take by mouth as needed       simvastatin (ZOCOR) 10 MG tablet Take 1 tablet (10 mg) by mouth daily 90 tablet 3     warfarin ANTICOAGULANT (COUMADIN) 2.5 MG tablet TAKE 1.25 MG (1/2 TABLET) MON/FRI AND 2.5 MG (1 TABLET) ALL OTHER DAYS 72 tablet 1     Answers for HPI/ROS submitted by the patient on 9/24/2020   General Symptoms: Yes  Skin Symptoms: No  HENT Symptoms: Yes  EYE SYMPTOMS: No  HEART SYMPTOMS: No  LUNG SYMPTOMS: No  INTESTINAL SYMPTOMS: Yes  URINARY SYMPTOMS: Yes  REPRODUCTIVE SYMPTOMS: No  SKELETAL SYMPTOMS: No  BLOOD SYMPTOMS: No  NERVOUS SYSTEM SYMPTOMS: Yes  MENTAL HEALTH SYMPTOMS: No  Weight loss: Yes  Hearing loss: Yes  Trouble swallowing: Yes   Voice hoarseness: Yes  Dry mouth: Yes  Heart burn or indigestion: Yes  Frequent nighttime urination: Yes  Speech problems: Yes  Numbness: Yes    examination  Alert oriented x 3  Spells 5/5 on spelling world backwards.   3/3 on recall.   Slight ptosis on the left  He has ocular movement that is normal  He has hypophonia  He has normal facial expression  Hearing is preserved  Good range of motion in head rotation and able to move tongue normally.  He does not have significant postural or action tremor.   He has some irregularity in finger tapping, h and  movements and rapid alternating movements bilaterally.

## 2020-09-23 NOTE — PROGRESS NOTES
Speech-Language Pathology Department   EVALUATION  [facility]  Video Swallow Study Evaluation    09/22/20 1400   General Information   Type Of Visit Initial   Start Of Care Date 09/23/20   Referring Physician Dr. 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 He was evaluated by Dr. Mitchell on 9/4/20, which revealed significant pooling of secretions in the piriforms, R>L. He has had a concerning amount of unintentional weightloss of 25lbs since February 2020. He had a clinical swallow evaluation in July in [place] that revealed mild dysphagia; he was trained on exercises. Today, he states he did not find the exercises very helpful and he is no longer doing them regularly. States he does the lester occasionally. Endorses coughing/TC with liquids daily. Endorses feeling of stasis with solids. Denies difficulty with pills. Denies odynophagia. Endorses occasional reflux symptoms such as heartburn and takes Tums PRN. States yesterday he ate meatloaf, casseole, sausage, and a pancake. Endorses mild feeling of stasis with these items. Denies articulation issues. Endorses voice changes stating others often tell him they can't hear him. Also endorses new onset drooling at night when  sleeping.  He presents today for video swallow study evaluation unaccompanied.    Respiratory Status Room air   Prior Level Of Function Swallowing   Prior Level Of Function Comment Regular solids and thin liquids   General Observations Pt is cooperative throughout evaluation. However, his face appears somewhat masked throughout and his movements are slow.    Patient/family Goals To evaluate swallow function   Clinical Swallow Evaluation   Oral Musculature generally intact   Structural Abnormalities none present   Dentition present and adequate   Mucosal Quality adequate   Mandibular Strength and Mobility intact   Oral Labial Strength and Mobility other (see comments)  (movements are slow)   Lingual Strength and Mobility other (see comments)  (movements are slow)   Velar Elevation intact   Buccal Strength and Mobility   (movements are slow)   Laryngeal Function Cough;Swallow;Voicing initiated   Oral Musculature Comments Movements are functional, but much slower than expected. Cough is minimally weak. Backwards focus of resonance. Masked facial appearance throughout evaluation. Otherwise WFL.   VFSS Eval: Radiology   Radiologist Resident   Views Taken left lateral;A/P   Physical Location of Procedure [facility]   VFSS Eval: Thin Liquid Texture Trial   Mode of Presentation, Thin Liquid cup;spoon;self-fed;fed by clinician   Order of Presentation 1,2,3,6,12   Preparatory Phase WFL   Oral Phase, Thin Liquid WFL   Pharyngeal Phase, Thin Liquid Residue in valleculae;Residue in pyriform sinus;Pharyngeal wall coating   Rosenbek's Penetration Aspiration Scale: Thin Liquid Trial Results 8 - contrast passes glottis, visible subglottic residue remains, absent patient response (aspiration)   Response to Aspiration absent response, silent aspiration   Diagnostic Statement Penetration to the cords with residuals remaining. These residuals and residuals from valleculae/piriforms are silently aspirated. No reflexive response.  Clinician cued cough intermittently clears subglottic residue. Incomplete epiglottic inversion. Chin tuck and supraglottic swallow not helpful. Modified supraglottic (swallow, cough, swallow) somewhat helpful. Moderate to moderately severe residue in valleculae and pyriforms. Lining on posterior pharyngeal wall.    VFSS Eval: Nectar Thick Liquid Texture Trial   Mode of Presentation, Nectar cup;spoon;self-fed;fed by clinician   Order of Presentation 4,5,9,11,13,14  (13,14-AP)   Preparatory Phase WFL   Oral Phase, Nectar WFL   Pharyngeal Phase, Nectar Residue in valleculae;Residue in pyriform sinus;Pharyngeal wall coating   Rosenbek's Penetration Aspiration Scale: Nectar-Thick Liquid Trial Results 6 - contrast passes glottis, no subglottic residue remains (aspiration)   Response to Aspiration, Nectar absent response, silent aspiration   Diagnostic Statement Penetration to the cords with remaining residuals on cords with no reflexive response. During subsequent swallows, residuals go below the cords during the swallow and then come back up. Moderate to moderately severe residuals in valleculae and piriforms; lining on posterior pharyngeal wall. AP view reveals left bolus preference. Left headturn not helpful.    VFSS Eval: Honey Thick Texture Trial   Mode of Presentation, Honey spoon;fed by clinician   Order of Presentation 7,8   Preparatory Phase WFL   Oral Phase, Honey WFL   Pharyngeal Phase, Honey Pharyngeal wall coating;Residue in valleculae;Residue in pyriform sinus   Rosenbek's Penetration Aspiration Scale: Honey Trial Results 2 - contrast enters airway, remains above the vocal cords, no residue remains (penetration)   Diagnostic Statement Penetration with remaining residuals in laryngeal vestibule. No aspiration. Severe vallecular/piriform residue.    VFSS Eval: Puree Solid Texture Trial   Mode of Presentation, Puree spoon;self-fed   Order of Presentation 10   Preparatory Phase WFL   Oral Phase, Puree WFL    Pharyngeal Phase, Puree Pharyngeal wall coating;Residue in valleculae;Residue in pyriform sinus   Rosenbek's Penetration Aspiration Scale: Puree Food Trial Results 2 - contrast enters airway, remains above the vocal cords, no residue remains (penetration)   Diagnostic Statement Pt unable to move majority of bolus from valleculae resulting in severe vallecular residual. Suspect penetration of combination of puree/liquid residuals with remaining residuals in laryngeal vestibule. With liquid wash, puree residuals are observed to coat AE folds into pyriforms. No definitive aspiration observed.    Swallow Compensations   Swallow Compensations Reduce amounts;Multiple swallow;Supraglottic swallow;Effortful swallow;Alternate viscosity of consistencies   Results Suspect silent aspiration   Educational Assessment   Barriers to Learning No barriers  (However, pt doesn't appear to understand full extent of his dysphagia)   Esophageal Phase of Swallow   Esophageal comments Esophagram ordered, but not completed d/t silent aspiration and severe residuals observed in video swallow portion of exam   General Therapy Interventions   Planned Therapy Interventions Dysphagia Treatment   Dysphagia treatment Oropharyngeal exercise training;Modified diet education;Instruction of safe swallow strategies;Compensatory strategies for swallowing   Swallow Eval: Clinical Impressions   Skilled Criteria for Therapy Intervention Skilled criteria met.  Treatment indicated.   Dysphagia Outcome Severity Scale (KATHRYN) Level 2 - KATHRYN   Treatment Diagnosis Moderate to moderately severe dysphagia   Diet texture recommendations Dysphagia diet level 2;Thin liquids   Recommended Feeding/Eating Techniques alternate between small bites and sips of food/liquid;maintain upright posture during/after eating for 30 mins;small sips/bites;other (see comments)  (aggressive oral cares, supraglottic swallow)   Rehab Potential fair, will monitor progress closely    Demonstrates Need for Referral to Another Service other (see comments)  (Neurology)   Anticipated Discharge Disposition home w/ outpatient services   Risks and Benefits of Treatment have been explained. Yes   Patient, family and/or staff in agreement with Plan of Care Yes   Clinical Impression Comments Haresh Rock presents with moderate severe dysphagia characterized by silent aspiration, incomplete epiglottic inversion and diffuse pharyngeal weakness. Oral motor examination reveals Movements are functional, but much slower than expected. Cough is minimally weak. Backwards focus of resonance. Masked facial appearance throughout evaluation. Otherwise WFL. Pt demonstrates penetration with residuals remaining in laryngeal vestibule with thin, nectar and honey thick liquids. Remaining residuals of thin and nectar thick consistencies from laryngeal vestibule, on the cords, valleculae and piriform sinus are observed to be silently aspirated. No reflexive response. Clinician cued cough clears majority of subglottic residuals; however, new penetration and eventual aspiration is observed from moderately severe valleculae/piriform residuals. Pt demonstrates moderate to moderately severe residuals in valleculae, piriforms and on posterior pharyngeal wall that increases as PO viscosity increases. Pt unable to move majority of puree bolus from valleculae despite multiple dry swallows and liquid washes. AP reveals left bolus preference. Left head turn not helpful. Supraglottic swallow (swallow-cough-swallow) is helpful to clear aspirated material given pt's lack of reflexive response. Based on results of today's evaluation, pt is at high risk of aspiration during and after the swallow. Recommend pt initiate DD2 with thin liquids with use of supraglottic swallow strategy. Pt should take pills crushed, if possible, in puree texture. Pt should complete thorough oral cares 20-30 minutes prior to eating. Trained pt re:  anatomy/physiology of swallowing, results of today's study, and diet recommendations. Strongly recommend pt undergo neurology evaluation given significant unintentional weight loss, silent aspiration and other oral motor findings. Unfortunately, the pt is unaccompanied today and does not appear to understand the full extent of his dysphagia. Pt will benefit from ongoing SLP services to ensure diet tolerance, train safe/compensatory swallow strategies, and train oropharyngeal exercises to help maximize swallow function.    Swallow Goals   SLP Swallow Goals 1;2   Swallow Goal 1   Goal Identifier Diet   Goal Description 1. Pt will tolerate DD2 with thin liquids with independent use of supraglottic swallow (swallow-cough-swallow) in 9/10 PO trials and no adverse medical events over a 3 month period.    Target Date 12/22/20   Swallow Goal 2   Goal Identifier Exercises   Goal Description 2. Pt will increase strength and ROM of pharyngeal musculature by completing 10 reps of 3/3 exercises 3x/day with minimal written and/or verbal cues.    Target Date 12/22/20   Total Session Time   SLP Eval: VideoFluoroscopic Swallow function Minutes (74800) 30   Total Evaluation Time 30     Thank you for the referral of Haresh Rock. If you have any questions about this report, please contact me using the information below.     JOSE LUIS Colorado MA (music), CCC-SLP   Speech Language Pathologist  [facility] Trained Vocologist   [facility] [place]  Dept. of Otolaryngology  Department of Rehabilitation Services  [address] [place] [id number]  Email: [email or web address]   Pronouns: she/her/hers

## 2020-09-24 ENCOUNTER — VIRTUAL VISIT (OUTPATIENT)
Dept: NEUROLOGY | Facility: CLINIC | Age: 70
End: 2020-09-24
Attending: OTOLARYNGOLOGY
Payer: COMMERCIAL

## 2020-09-24 DIAGNOSIS — F80.0 ARTICULATION DISORDER: ICD-10-CM

## 2020-09-24 DIAGNOSIS — G20.C PARKINSONISM, UNSPECIFIED PARKINSONISM TYPE (H): Primary | ICD-10-CM

## 2020-09-24 RX ORDER — AMOXICILLIN 500 MG/1
CAPSULE ORAL
COMMUNITY
Start: 2020-09-24 | End: 2021-10-29

## 2020-09-24 RX ORDER — CALCIUM CARBONATE 500 MG/1
TABLET, CHEWABLE ORAL
COMMUNITY
End: 2021-09-29

## 2020-09-24 RX ORDER — ACETAMINOPHEN 500 MG
1000 TABLET ORAL 2 TIMES DAILY PRN
COMMUNITY
End: 2022-01-05

## 2020-09-24 ASSESSMENT — ENCOUNTER SYMPTOMS
TROUBLE SWALLOWING: 1
SPEECH CHANGE: 1
WEIGHT LOSS: 1
HEARTBURN: 1
HOARSE VOICE: 1
NUMBNESS: 1

## 2020-09-24 NOTE — LETTER
9/24/2020       RE: Haresh Rock  [address] [place] [address]     Dear Colleague,    Thank you for referring your patient, Haresh Rock, to the [facility] NEUROLOGY at [facility]. Please see a copy of my visit note below.      VIDEO VISIT - used alternative video platform.     Date of Visit: September 24, 2020  Name: Haresh Rock  Date of Birth 1950  [address] [place] [id number] [phone] (M)  [phone] (H)  [email or web address]  Has mychart  proxy - sheyla Ramirez - son  Gemma - daughter  Sheyla Rock  [phone]    Assessment:  (F80.0) Articulation disorder. Hypophonia for the past 3-4 months. He has lost 20 lbs or so. There are no diet or eating changes.  No tremor. Right handed. Denies lack of arm swing. Has not had falls. He has had some slowness over and naps every day and is up after midnight and may get up at 7am. He has features of parkinsonism.    Has atrial fibrillation and has had 2 ablation attempts and has had benefit from flecainide for the past 2 years.   Heart is doing well and monitoring with apple watch  He is on metoprolol and flecainide.    Past history of hypotension    Anticoagulated    Hereditary hemochromatosis gene - gene that increases his risk and has not had problems with this. Had had phlebotomies x 2 in the past.     Has had a history of myeloproliferative disease - too many platelets and not enough hemoglobin. Had a transplant from Dr. 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  No problems since then.     Hodgkin's lymphoma in the past 2011 and had chemotherapy for this and followed with CT scan and reportedly this is gone.     Pulmonary hypertension in the past.     Reportedly had a history of pulmonary embolii - back in 2004. This was related to blood clotting problems.   May have had a DVT. Reportedly has had antiphospholipid antibody. He is on coumadin.     Gallstones but has not  gallbladder removed.  Had a gallbladder attack x 1 and is not on actigall    Endo - cholesterol reportedly okay on simvastatin  Had diabetes in the past from prednisone and once weaned off prednisone in 2011 and had shock from low blood sugar and since then he has not had diabetes issues. Had been checking blood sugars for a while. His A1c 6.3 was 2 months ago. May have a thyroid nodule.  TSH was normal.     He states he never was a drinker but has had liver problems which reportedly may have been due to iron accumulation in the liver.     Denies constipation and may have a bit of heartburn and taking tums as needed for 3 weeks.     Renal function.   May have stage 2 disease.     Prostate - denies problems. He has has nocturia couple times per night.   Denies elevated psa    Smell perception has been affected since chemotherapy in 2011 and his taste is affected as well.     Denies allergies or infections.     He denies talking in his sleep or snoring. He does not sleep in the same bed as his wife who sleeps elsewhere and she is using cpap.    He states he does not have significant memory problems. He  May forget things at times. He has not had problems with driving.     Daughter has bipolar. He denies psychiatric problems. Denies depression or anxiety. Thinks his kids inherited psych problems from his wife. Son with ocd, eating problem and some depression    msk - bilateral knee replacements. He states he does not have problems walking  He does not exercise or walk much. They have a 5 level house and is walking up stairs. He has computers all over and walking all over. He is not a touch  and has noticed that he has made more mistakes in typing. His handwriting is affected and worse over the years. He has some decreased size in handwriting.     Eye - left eye has been poor since age 2 due to a lazy eye - amblyopia  Right eye post cataract surgery had an artificial lens put in and does not wear glasses till the  evening when his eyes are tired. His vision is relatively good except when he is tired.     Hearing. Feels he has wax in his left ear and partial problem with the right ear. It is variable problem. Has used ear wax removal.     He reports a neuropathy     Medications            Acetaminophen tylenol 500mg        Amoxicillin amoxil  For dentist       Flecainide tambocor 50mg 1   1    Folic acid folvite 1mg 1       Metoprolol tartrate lopressor 25mg 1   1    MVI centrum variable       Pantoprazole protonix 20mg    1    Prevident 5000 dry mouth using       Psuedoeph-doxylamine DM APAP nyquil rare       Simvastatin zocor    1    Warfarin anticoagulant coumadin 5 days - 1 tab; 1/2 tab other days                 Plan:  Dopamine scan - (datscan) ordered.     He will check in with his gastroenterologist about getting a Endoscopy to evaluate for weight loss.     Return to see in 1-3 months to review dopamine scan (datscan ) results.     Mychart granted to his wife per his wishes.     Discussed big and loud therapy    Discussed parkinson as a probable diagnosis.     I have reviewed the note as documented above.  This accurately captures the substance of my conversation with the patient.  Patient contact time  60  minutes. Over 50% of this visit was spent in patient care and care coordination.     Time of visit 210-313pm    Ángel Hill MD  -----------------------------------------------------------------------------------------------------------------------------------------------  Haresh EMILIE Pranav is a 70 year old male who is being evaluated via a billable video visit.      Charts reviewed  Consult from  Images reviewed    I have reviewed and updated the patient's Past Medical History, Social History, Family History and Medication List.    ALLERGIES  No known drug allergy    Lasts visit details if there was a last visit:                                      14 Review of systems  are negative except for   Patient Active Problem  List   Diagnosis     Hemochromatosis     Antiphospholipid antibody syndrome (H)     Anjqz-vhrvxs-iibm disease, chronic (H)     Advanced directives, counseling/discussion     Polyneuropathy     Status post total knee replacements     Status post bone marrow transplant (H)     Hyperlipidemia with target LDL less than 100     Atrial fibrillation (H)     Personal history of DVT (deep vein thrombosis)     Chronic rhinitis     Gallstones without obstruction of gallbladder     Long-term (current) use of anticoagulants [Z79.01]     Thyroid nodule     Type 2 diabetes mellitus with stage 2 chronic kidney disease (H)     Renal hypertension     History of Hodgkin's lymphoma     History of irradiation, presenting hazards to health     Primary pulmonary hypertension (H)     Hereditary hemochromatosis (H)     Oropharyngeal dysphagia     Articulation disorder     Cardiac dysrhythmia     Disease of blood and blood forming organ     Personal history of PE (pulmonary embolism)     Complications of bone marrow transplant (H)     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)     Renal failure, chronic     Diabetes mellitus type 1 (H)     Hypotension     Essential hypertension     Hyperlipidemia     PAF (paroxysmal atrial fibrillation) (H)     Total knee replacement status        Allergies   Allergen Reactions     No Known Drug Allergy      Past Surgical History:   Procedure Laterality Date     ANESTHESIA CARDIOVERSION  4/21/2011    Procedure:ANESTHESIA CARDIOVERSION; Surgeon:GENERIC ANESTHESIA PROVIDER; Location:[facility] OR     ARTHROSCOPY KNEE RT/LT      LT     CATARACT IOL, RT/LT  2017     COLONOSCOPY  10/16/2012    Procedure: COLONOSCOPY;  Colonoscopy, screening;  Surgeon: Reg Feliciano MD;  Location: [facility] OR     H ABLATION FOCAL AFIB  3/5/2013    PVI     H ABLATION FOCAL AFIB  01/01/2018      BONE MARROW/STEM TRANSPLANT ALLOGENIC  5/8/2007     INSERT PORT VASCULAR ACCESS       JOINT REPLACEMTN, KNEE RT/LT  3/28/12    SHAWN     VASECTOMY   1990     Past Medical History:   Diagnosis Date     Antiphospholipid antibody syndrome (H) 2005    Ravi's     Articulation disorder 9/23/2020     Atrial fibrillation (H) 4/2011     Cirrhosis (H)      CKD (chronic kidney disease) stage 2, GFR 60-89 ml/min      Diabetes mellitus type II     steroid induced     DJD (degenerative joint disease) of knee     SHAWN, worse on right     DVT (deep venous thrombosis) (H)      ED (erectile dysfunction)      Gallbladder polyp 5/2010     Qwtka-Ztdzmt-Vtla Disease 2007    BMT     Hemochromatosis      Hodgkin's disease NOS     5 cycles of ABVD in 2011     HTN (hypertension)      Hyperlipidaemia LDL goal <100      Multiple thyroid nodules     benign      Myeloproliferative disorder (H)     atypical, [facility]     PE (pulmonary embolism) 11/2004     Polyneuropathy      Primary pulmonary hypertension (H)      Social History     Socioeconomic History     Marital status:      Spouse name: Angelica     Number of children: 2     Years of education: 21     Highest education level: Not on file   Occupational History     Occupation:      Employer: [employer]     Employer: DISABLED   Social Needs     Financial resource strain: Not on file     Food insecurity     Worry: Not on file     Inability: Not on file     Transportation needs     Medical: Not on file     Non-medical: Not on file   Tobacco Use     Smoking status: Never Smoker     Smokeless tobacco: Never Used   Substance and Sexual Activity     Alcohol use: No     Drug use: No     Sexual activity: Not Currently     Partners: Female   Lifestyle     Physical activity     Days per week: Not on file     Minutes per session: Not on file     Stress: Not on file   Relationships     Social connections     Talks on phone: Not on file     Gets together: Not on file     Attends Anabaptist service: Not on file     Active member of club or organization: Not on file     Attends meetings of clubs or organizations: Not on file      Relationship status: Not on file     Intimate partner violence     Fear of current or ex partner: Not on file     Emotionally abused: Not on file     Physically abused: Not on file     Forced sexual activity: Not on file   Other Topics Concern     Parent/sibling w/ CABG, MI or angioplasty before 65F 55M? No   Social History Narrative     Not on file     Family History   Problem Relation Age of Onset     Hypertension Mother      Cerebrovascular Disease Mother      Breast Cancer Mother      Arrhythmia Brother      Thyroid Cancer Daughter      Bipolar Disorder Daughter      Arrhythmia Sister      Alzheimer Disease Father      Diabetes Paternal Aunt      Gastrointestinal Disease Maternal Grandmother         colitis      Thyroid Disease Sister      C.A.D. No family hx of      Current Outpatient Medications   Medication Sig Dispense Refill     amoxicillin (AMOXIL) 500 MG capsule        CENTRUM OR 1 TABLET DAILY       flecainide (TAMBOCOR) 50 MG tablet Take 1 tablet (50 mg) by mouth 2 times daily 180 tablet 3     folic acid (FOLVITE) 1 MG tablet Take 1 tablet (1,000 mcg) by mouth daily 90 tablet 3     metoprolol tartrate (LOPRESSOR) 25 MG tablet TAKE 1 TABLET BY MOUTH TWICE A  tablet 3     pantoprazole (PROTONIX) 20 MG EC tablet TAKE 1 TABLET (20 MG) BY MOUTH AT BEDTIME 90 tablet 3     PREVIDENT 5000 DRY MOUTH 1.1 % GEL topical gel Take by mouth daily  3     Pseudoeph-Doxylamine-DM-APAP (NYQUIL PO) Take by mouth as needed       simvastatin (ZOCOR) 10 MG tablet Take 1 tablet (10 mg) by mouth daily 90 tablet 3     warfarin ANTICOAGULANT (COUMADIN) 2.5 MG tablet TAKE 1.25 MG (1/2 TABLET) MON/FRI AND 2.5 MG (1 TABLET) ALL OTHER DAYS 72 tablet 1     Answers for HPI/ROS submitted by the patient on 9/24/2020   General Symptoms: Yes  Skin Symptoms: No  HENT Symptoms: Yes  EYE SYMPTOMS: No  HEART SYMPTOMS: No  LUNG SYMPTOMS: No  INTESTINAL SYMPTOMS: Yes  URINARY SYMPTOMS: Yes  REPRODUCTIVE SYMPTOMS: No  SKELETAL SYMPTOMS:  No  BLOOD SYMPTOMS: No  NERVOUS SYSTEM SYMPTOMS: Yes  MENTAL HEALTH SYMPTOMS: No  Weight loss: Yes  Hearing loss: Yes  Trouble swallowing: Yes   Voice hoarseness: Yes  Dry mouth: Yes  Heart burn or indigestion: Yes  Frequent nighttime urination: Yes  Speech problems: Yes  Numbness: Yes    examination  Alert oriented x 3  Spells 5/5 on spelling world backwards.   3/3 on recall.   Slight ptosis on the left  He has ocular movement that is normal  He has hypophonia  He has normal facial expression  Hearing is preserved  Good range of motion in head rotation and able to move tongue normally.  He does not have significant postural or action tremor.   He has some irregularity in finger tapping, h and movements and rapid alternating movements bilaterally.       Again, thank you for allowing me to participate in the care of your patient.  Sincerely,    Ángel Hill MD

## 2020-09-24 NOTE — PATIENT INSTRUCTIONS
Assessment:  (F80.0) Articulation disorder. Hypophonia for the past 3-4 months. He has lost 20 lbs or so. There are no diet or eating changes.  No tremor. Right handed. Denies lack of arm swing. Has not had falls. He has had some slowness over and naps every day and is up after midnight and may get up at 7am. He has features of parkinsonism.    Has atrial fibrillation and has had 2 ablation attempts and has had benefit from flecainide for the past 2 years.   Heart is doing well and monitoring with apple watch  He is on metoprolol and flecainide.    Past history of hypotension    Anticoagulated    Hereditary hemochromatosis gene - gene that increases his risk and has not had problems with this. Had had phlebotomies x 2 in the past.     Has had a history of myeloproliferative disease - too many platelets and not enough hemoglobin. Had a transplant from Dr. 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  No problems since then.     Hodgkin's lymphoma in the past 2011 and had chemotherapy for this and followed with CT scan and reportedly this is gone.     Pulmonary hypertension in the past.     Reportedly had a history of pulmonary embolii - back in 2004. This was related to blood clotting problems.   May have had a DVT. Reportedly has had antiphospholipid antibody. He is on coumadin.     Gallstones but has not gallbladder removed.  Had a gallbladder attack x 1 and is not on actigall    Endo - cholesterol reportedly okay on simvastatin  Had diabetes in the past from prednisone and once weaned off prednisone in 2011 and had shock from low blood sugar and since then he has not had diabetes issues. Had been checking blood sugars for a while. His A1c 6.3 was 2 months ago. May have a thyroid nodule.  TSH was normal.     He states he never was a drinker but has had liver problems which reportedly may have been due to iron accumulation in the liver.     Denies constipation and may have a bit of heartburn and  taking tums as needed for 3 weeks.     Renal function.   May have stage 2 disease.     Prostate - denies problems. He has has nocturia couple times per night.   Denies elevated psa    Smell perception has been affected since chemotherapy in 2011 and his taste is affected as well.     Denies allergies or infections.     He denies talking in his sleep or snoring. He does not sleep in the same bed as his wife who sleeps elsewhere and she is using cpap.    He states he does not have significant memory problems. He  May forget things at times. He has not had problems with driving.     Daughter has bipolar. He denies psychiatric problems. Denies depression or anxiety. Thinks his kids inherited psych problems from his wife. Son with ocd, eating problem and some depression    msk - bilateral knee replacements. He states he does not have problems walking  He does not exercise or walk much. They have a 5 level house and is walking up stairs. He has computers all over and walking all over. He is not a touch  and has noticed that he has made more mistakes in typing. His handwriting is affected and worse over the years. He has some decreased size in handwriting.     Eye - left eye has been poor since age 2 due to a lazy eye - amblyopia  Right eye post cataract surgery had an artificial lens put in and does not wear glasses till the evening when his eyes are tired. His vision is relatively good except when he is tired.     Hearing. Feels he has wax in his left ear and partial problem with the right ear. It is variable problem. Has used ear wax removal.     He reports a neuropathy     Medications            Acetaminophen tylenol 500mg        Amoxicillin amoxil  For dentist       Flecainide tambocor 50mg 1   1    Folic acid folvite 1mg 1       Metoprolol tartrate lopressor 25mg 1   1    MVI centrum variable       Pantoprazole protonix 20mg    1    Prevident 5000 dry mouth using       Psuedoeph-doxylamine DM APAP nyquil rare        Simvastatin zocor    1    Warfarin anticoagulant coumadin 5 days - 1 tab; 1/2 tab other days                 Plan:    Dopamine scan - (datscan) ordered.     He will check in with his gastroenterologist about getting a Endoscopy to evaluate for weight loss.     Return to see in 1-3 months to review dopamine scan (datscan ) results.     Lamonte granted to his wife per his wishes.     Discussed big and loud therapy    Discussed parkinson as a probable diagnosis.

## 2020-09-25 ENCOUNTER — ANTICOAGULATION THERAPY VISIT (OUTPATIENT)
Dept: FAMILY MEDICINE | Facility: CLINIC | Age: 70
End: 2020-09-25

## 2020-09-25 ENCOUNTER — TRANSFERRED RECORDS (OUTPATIENT)
Dept: HEALTH INFORMATION MANAGEMENT | Facility: CLINIC | Age: 70
End: 2020-09-25

## 2020-09-25 DIAGNOSIS — Z79.01 LONG TERM CURRENT USE OF ANTICOAGULANT THERAPY: ICD-10-CM

## 2020-09-25 DIAGNOSIS — I48.91 ATRIAL FIBRILLATION (H): ICD-10-CM

## 2020-09-25 DIAGNOSIS — Z86.718 PERSONAL HISTORY OF DVT (DEEP VEIN THROMBOSIS): ICD-10-CM

## 2020-09-25 DIAGNOSIS — D68.61 ANTIPHOSPHOLIPID ANTIBODY SYNDROME (H): ICD-10-CM

## 2020-09-25 LAB — INR PPP: 3 (ref 0.9–1.1)

## 2020-09-25 NOTE — PROGRESS NOTES
ANTICOAGULATION MANAGEMENT     Patient Name:  Haresh Rock  Date:  9/25/2020    ASSESSMENT /SUBJECTIVE:    Today's INR result of 3.0 is therapeutic. Goal INR of 2.0-3.0      Warfarin dose taken: Warfarin taken as instructed    Diet: having trouble swallowing; has been prescribed a soft diet    Medication changes/ interactions: No new medications/supplements affecting INR    Previous INR: Therapeutic     S/S of bleeding or thromboembolism: No    New injury or illness: No    Upcoming surgery, procedure or cardioversion: No    Additional findings: None      PLAN:    Telephone call with Haresh regarding INR result and instructed:     Warfarin Dosing Instructions: Continue your current warfarin dose 1.25 mg MF; 2.5 mg ROW    Instructed patient to follow up no later than: 1 week Patient to recheck with home meter    Education provided: None required      Haresh verbalizes understanding and agrees to warfarin dosing plan.    Instructed to call the Anticoagulation Clinic for any changes, questions or concerns. (#[phone])        Joellen Molina RN      OBJECTIVE:  Recent labs: (last 7 days)     09/25/20   INR 3.0*         No question data found.  Anticoagulation Summary  As of 9/25/2020    INR goal:   2.0-3.0   TTR:   91.9 % (1 y)   INR used for dosing:   3.0 (9/25/2020)   Warfarin maintenance plan:   1.25 mg (2.5 mg x 0.5) every Mon, Fri; 2.5 mg (2.5 mg x 1) all other days   Full warfarin instructions:   1.25 mg every Mon, Fri; 2.5 mg all other days   Weekly warfarin total:   15 mg   Plan last modified:   Laverne Ferguson RN (11/14/2018)   Next INR check:   10/2/2020   Priority:   Maintenance   Target end date:   Indefinite    Indications    Long-term (current) use of anticoagulants [Z79.01] [Z79.01]  Atrial fibrillation (H) [I48.91]  Antiphospholipid antibody syndrome (H) [D68.61]  Personal history of DVT (deep vein thrombosis) [Z86.718]  Atrial fibrillation  unspecified type (H) (Resolved) [I48.91]              Anticoagulation Episode Summary     INR check location:       Preferred lab:   EXTERNAL LAB    Send INR reminders to:   CHELSEA CHILEL    Comments:   JESSICA okay to manage by exception      Anticoagulation Care Providers     Provider Role Specialty Phone number    Anya Nowak MD [facility] [phone]

## 2020-09-30 DIAGNOSIS — Z11.59 ENCOUNTER FOR SCREENING FOR OTHER VIRAL DISEASES: Primary | ICD-10-CM

## 2020-10-02 ENCOUNTER — TRANSFERRED RECORDS (OUTPATIENT)
Dept: HEALTH INFORMATION MANAGEMENT | Facility: CLINIC | Age: 70
End: 2020-10-02

## 2020-10-02 ENCOUNTER — DOCUMENTATION ONLY (OUTPATIENT)
Dept: FAMILY MEDICINE | Facility: CLINIC | Age: 70
End: 2020-10-02

## 2020-10-02 DIAGNOSIS — I48.91 ATRIAL FIBRILLATION (H): ICD-10-CM

## 2020-10-02 DIAGNOSIS — D68.61 ANTIPHOSPHOLIPID ANTIBODY SYNDROME (H): ICD-10-CM

## 2020-10-02 DIAGNOSIS — Z86.718 PERSONAL HISTORY OF DVT (DEEP VEIN THROMBOSIS): ICD-10-CM

## 2020-10-02 DIAGNOSIS — Z79.01 LONG TERM CURRENT USE OF ANTICOAGULANT THERAPY: ICD-10-CM

## 2020-10-02 LAB — INR PPP: 2.5 (ref 0.9–1.1)

## 2020-10-02 NOTE — PROGRESS NOTES
ANTICOAGULATION  MANAGEMENT-Patient Home Monitoring Result    Assessment     Therapeutic INR result of 2.5 . Goal range 2.0-3.0. Received via fax from [facility] INR home INR monitoring company.        Previous INR was therapeutic    Haresh was last contacted by phone:     Plan     Per home monitoring agreement with patient, patient was NOT contacted regarding therapeutic result today.  Patient is to continue current dose and continue to check INR with home monitor per protocol.  ?       OBJECTIVE    INR   Date Value Ref Range Status   10/02/2020 2.5 (A) 0.90 - 1.10 Final     Factor 2 Assay   Date Value Ref Range Status   2007 58 (L) 60 - 140 % Final     Comment:     (Note)  CALLED TO TAMARA(INTERV. RADIOLOGY)[id number],        ASSESSMENT / PLAN  No question data found.  Anticoagulation Summary  As of 10/2/2020    INR goal:  2.0-3.0   TTR:  91.9 % (1 y)   INR used for dosin.5 (10/2/2020)   Warfarin maintenance plan:  1.25 mg (2.5 mg x 0.5) every Mon, Fri; 2.5 mg (2.5 mg x 1) all other days   Full warfarin instructions:  1.25 mg every Mon, Fri; 2.5 mg all other days   Weekly warfarin total:  15 mg   Plan last modified:  Laverne Ferguson RN (2018)   Next INR check:  10/9/2020   Priority:  Maintenance   Target end date:  Indefinite    Indications    Long-term (current) use of anticoagulants [Z79.01] [Z79.01]  Atrial fibrillation (H) [I48.91]  Antiphospholipid antibody syndrome (H) [D68.61]  Personal history of DVT (deep vein thrombosis) [Z86.718]  Atrial fibrillation  unspecified type (H) (Resolved) [I48.91]             Anticoagulation Episode Summary     INR check location:      Preferred lab:  EXTERNAL LAB    Send INR reminders to:  CHELSEA CHILEL    Comments:  JESSICA rodas to manage by exception      Anticoagulation Care Providers     Provider Role Specialty Phone number    Anya Nowak MD Referring [facility] [phone]

## 2020-10-04 DIAGNOSIS — Z11.59 ENCOUNTER FOR SCREENING FOR OTHER VIRAL DISEASES: ICD-10-CM

## 2020-10-04 PROCEDURE — U0003 INFECTIOUS AGENT DETECTION BY NUCLEIC ACID (DNA OR RNA); SEVERE ACUTE RESPIRATORY SYNDROME CORONAVIRUS 2 (SARS-COV-2) (CORONAVIRUS DISEASE [COVID-19]), AMPLIFIED PROBE TECHNIQUE, MAKING USE OF HIGH THROUGHPUT TECHNOLOGIES AS DESCRIBED BY CMS-2020-01-R: HCPCS | Performed by: INTERNAL MEDICINE

## 2020-10-05 LAB
SARS-COV-2 RNA SPEC QL NAA+PROBE: NOT DETECTED
SPECIMEN SOURCE: NORMAL

## 2020-10-06 ENCOUNTER — ANESTHESIA EVENT (OUTPATIENT)
Dept: SURGERY | Facility: AMBULATORY SURGERY CENTER | Age: 70
End: 2020-10-06

## 2020-10-07 ENCOUNTER — HOSPITAL ENCOUNTER (OUTPATIENT)
Facility: AMBULATORY SURGERY CENTER | Age: 70
Discharge: HOME | End: 2020-10-07
Attending: INTERNAL MEDICINE | Admitting: INTERNAL MEDICINE
Payer: COMMERCIAL

## 2020-10-07 ENCOUNTER — ANESTHESIA (OUTPATIENT)
Dept: SURGERY | Facility: AMBULATORY SURGERY CENTER | Age: 70
End: 2020-10-07

## 2020-10-07 VITALS
RESPIRATION RATE: 16 BRPM | OXYGEN SATURATION: 99 % | SYSTOLIC BLOOD PRESSURE: 106 MMHG | BODY MASS INDEX: 23.7 KG/M2 | DIASTOLIC BLOOD PRESSURE: 62 MMHG | TEMPERATURE: 97.1 F | HEIGHT: 72 IN | HEART RATE: 54 BPM | WEIGHT: 175 LBS

## 2020-10-07 VITALS — HEART RATE: 56 BPM

## 2020-10-07 LAB — UPPER GI ENDOSCOPY: NORMAL

## 2020-10-07 PROCEDURE — 43239 EGD BIOPSY SINGLE/MULTIPLE: CPT | Mod: 59

## 2020-10-07 PROCEDURE — 88305 TISSUE EXAM BY PATHOLOGIST: CPT | Mod: 26 | Performed by: PATHOLOGY

## 2020-10-07 PROCEDURE — 88342 IMHCHEM/IMCYTCHM 1ST ANTB: CPT | Mod: 26 | Performed by: PATHOLOGY

## 2020-10-07 PROCEDURE — 43248 EGD GUIDE WIRE INSERTION: CPT

## 2020-10-07 PROCEDURE — 88305 TISSUE EXAM BY PATHOLOGIST: CPT | Mod: TC | Performed by: INTERNAL MEDICINE

## 2020-10-07 PROCEDURE — 88341 IMHCHEM/IMCYTCHM EA ADD ANTB: CPT | Mod: TC | Performed by: INTERNAL MEDICINE

## 2020-10-07 PROCEDURE — 88342 IMHCHEM/IMCYTCHM 1ST ANTB: CPT | Mod: TC | Performed by: INTERNAL MEDICINE

## 2020-10-07 RX ORDER — MEPERIDINE HYDROCHLORIDE 25 MG/ML
12.5 INJECTION INTRAMUSCULAR; INTRAVENOUS; SUBCUTANEOUS
Status: DISCONTINUED | OUTPATIENT
Start: 2020-10-07 | End: 2020-10-08 | Stop reason: HOSPADM

## 2020-10-07 RX ORDER — SODIUM CHLORIDE, SODIUM LACTATE, POTASSIUM CHLORIDE, CALCIUM CHLORIDE 600; 310; 30; 20 MG/100ML; MG/100ML; MG/100ML; MG/100ML
INJECTION, SOLUTION INTRAVENOUS CONTINUOUS
Status: DISCONTINUED | OUTPATIENT
Start: 2020-10-07 | End: 2020-10-08 | Stop reason: HOSPADM

## 2020-10-07 RX ORDER — SODIUM CHLORIDE, SODIUM LACTATE, POTASSIUM CHLORIDE, CALCIUM CHLORIDE 600; 310; 30; 20 MG/100ML; MG/100ML; MG/100ML; MG/100ML
INJECTION, SOLUTION INTRAVENOUS CONTINUOUS PRN
Status: DISCONTINUED | OUTPATIENT
Start: 2020-10-07 | End: 2020-10-07

## 2020-10-07 RX ORDER — ONDANSETRON 4 MG/1
4 TABLET, ORALLY DISINTEGRATING ORAL EVERY 6 HOURS PRN
Status: DISCONTINUED | OUTPATIENT
Start: 2020-10-07 | End: 2020-10-08 | Stop reason: HOSPADM

## 2020-10-07 RX ORDER — ONDANSETRON 2 MG/ML
4 INJECTION INTRAMUSCULAR; INTRAVENOUS EVERY 6 HOURS PRN
Status: DISCONTINUED | OUTPATIENT
Start: 2020-10-07 | End: 2020-10-08 | Stop reason: HOSPADM

## 2020-10-07 RX ORDER — ALBUTEROL SULFATE 0.83 MG/ML
2.5 SOLUTION RESPIRATORY (INHALATION) EVERY 4 HOURS PRN
Status: DISCONTINUED | OUTPATIENT
Start: 2020-10-07 | End: 2020-10-08 | Stop reason: HOSPADM

## 2020-10-07 RX ORDER — FENTANYL CITRATE 50 UG/ML
25-50 INJECTION, SOLUTION INTRAMUSCULAR; INTRAVENOUS
Status: DISCONTINUED | OUTPATIENT
Start: 2020-10-07 | End: 2020-10-08 | Stop reason: HOSPADM

## 2020-10-07 RX ORDER — NALOXONE HYDROCHLORIDE 0.4 MG/ML
.1-.4 INJECTION, SOLUTION INTRAMUSCULAR; INTRAVENOUS; SUBCUTANEOUS
Status: DISCONTINUED | OUTPATIENT
Start: 2020-10-07 | End: 2020-10-08 | Stop reason: HOSPADM

## 2020-10-07 RX ORDER — PROPOFOL 10 MG/ML
INJECTION, EMULSION INTRAVENOUS CONTINUOUS PRN
Status: DISCONTINUED | OUTPATIENT
Start: 2020-10-07 | End: 2020-10-07

## 2020-10-07 RX ORDER — FLUMAZENIL 0.1 MG/ML
0.2 INJECTION, SOLUTION INTRAVENOUS
Status: DISCONTINUED | OUTPATIENT
Start: 2020-10-07 | End: 2020-10-08 | Stop reason: HOSPADM

## 2020-10-07 RX ORDER — ONDANSETRON 2 MG/ML
4 INJECTION INTRAMUSCULAR; INTRAVENOUS
Status: COMPLETED | OUTPATIENT
Start: 2020-10-07 | End: 2020-10-07

## 2020-10-07 RX ORDER — LIDOCAINE 40 MG/G
CREAM TOPICAL
Status: DISCONTINUED | OUTPATIENT
Start: 2020-10-07 | End: 2020-10-07 | Stop reason: HOSPADM

## 2020-10-07 RX ORDER — LIDOCAINE HYDROCHLORIDE 20 MG/ML
INJECTION, SOLUTION INFILTRATION; PERINEURAL PRN
Status: DISCONTINUED | OUTPATIENT
Start: 2020-10-07 | End: 2020-10-07

## 2020-10-07 RX ORDER — PROCHLORPERAZINE MALEATE 5 MG
5 TABLET ORAL EVERY 6 HOURS PRN
Status: DISCONTINUED | OUTPATIENT
Start: 2020-10-07 | End: 2020-10-08 | Stop reason: HOSPADM

## 2020-10-07 RX ORDER — ONDANSETRON 2 MG/ML
4 INJECTION INTRAMUSCULAR; INTRAVENOUS EVERY 30 MIN PRN
Status: DISCONTINUED | OUTPATIENT
Start: 2020-10-07 | End: 2020-10-08 | Stop reason: HOSPADM

## 2020-10-07 RX ORDER — ACETAMINOPHEN 325 MG/1
975 TABLET ORAL ONCE
Status: DISCONTINUED | OUTPATIENT
Start: 2020-10-07 | End: 2020-10-07 | Stop reason: HOSPADM

## 2020-10-07 RX ORDER — PROPOFOL 10 MG/ML
INJECTION, EMULSION INTRAVENOUS PRN
Status: DISCONTINUED | OUTPATIENT
Start: 2020-10-07 | End: 2020-10-07

## 2020-10-07 RX ORDER — OXYCODONE HYDROCHLORIDE 5 MG/1
5 TABLET ORAL EVERY 4 HOURS PRN
Status: DISCONTINUED | OUTPATIENT
Start: 2020-10-07 | End: 2020-10-08 | Stop reason: HOSPADM

## 2020-10-07 RX ORDER — ACETAMINOPHEN 325 MG/1
975 TABLET ORAL ONCE
Status: DISCONTINUED | OUTPATIENT
Start: 2020-10-07 | End: 2020-10-08 | Stop reason: HOSPADM

## 2020-10-07 RX ORDER — ONDANSETRON 4 MG/1
4 TABLET, ORALLY DISINTEGRATING ORAL EVERY 30 MIN PRN
Status: DISCONTINUED | OUTPATIENT
Start: 2020-10-07 | End: 2020-10-08 | Stop reason: HOSPADM

## 2020-10-07 RX ORDER — HYDROMORPHONE HYDROCHLORIDE 1 MG/ML
.3-.5 INJECTION, SOLUTION INTRAMUSCULAR; INTRAVENOUS; SUBCUTANEOUS EVERY 10 MIN PRN
Status: DISCONTINUED | OUTPATIENT
Start: 2020-10-07 | End: 2020-10-08 | Stop reason: HOSPADM

## 2020-10-07 RX ORDER — LABETALOL 20 MG/4 ML (5 MG/ML) INTRAVENOUS SYRINGE
10
Status: DISCONTINUED | OUTPATIENT
Start: 2020-10-07 | End: 2020-10-08 | Stop reason: HOSPADM

## 2020-10-07 RX ORDER — HYDRALAZINE HYDROCHLORIDE 20 MG/ML
2.5-5 INJECTION INTRAMUSCULAR; INTRAVENOUS EVERY 10 MIN PRN
Status: DISCONTINUED | OUTPATIENT
Start: 2020-10-07 | End: 2020-10-08 | Stop reason: HOSPADM

## 2020-10-07 RX ADMIN — PROPOFOL 150 MCG/KG/MIN: 10 INJECTION, EMULSION INTRAVENOUS at 12:06

## 2020-10-07 RX ADMIN — SODIUM CHLORIDE, SODIUM LACTATE, POTASSIUM CHLORIDE, CALCIUM CHLORIDE: 600; 310; 30; 20 INJECTION, SOLUTION INTRAVENOUS at 12:00

## 2020-10-07 RX ADMIN — ONDANSETRON 4 MG: 2 INJECTION INTRAMUSCULAR; INTRAVENOUS at 12:11

## 2020-10-07 RX ADMIN — PROPOFOL 60 MG: 10 INJECTION, EMULSION INTRAVENOUS at 12:06

## 2020-10-07 RX ADMIN — LIDOCAINE HYDROCHLORIDE 50 MG: 20 INJECTION, SOLUTION INFILTRATION; PERINEURAL at 12:06

## 2020-10-07 ASSESSMENT — MIFFLIN-ST. JEOR: SCORE: 1581.79

## 2020-10-07 NOTE — H&P
Haresh Rock  [id number]  male  70 year old      Reason for procedure/surgery: oropharyngeal dysphagia, egd possible dilation    Patient Active Problem List   Diagnosis     Hemochromatosis     Antiphospholipid antibody syndrome (H)     Xzhny-owhdzs-wfhe disease, chronic (H)     Advanced directives, counseling/discussion     Polyneuropathy     Status post total knee replacements     Status post bone marrow transplant (H)     Hyperlipidemia with target LDL less than 100     Atrial fibrillation (H)     Personal history of DVT (deep vein thrombosis)     Chronic rhinitis     Gallstones without obstruction of gallbladder     Long-term (current) use of anticoagulants [Z79.01]     Thyroid nodule     Type 2 diabetes mellitus with stage 2 chronic kidney disease (H)     Renal hypertension     History of Hodgkin's lymphoma     History of irradiation, presenting hazards to health     Primary pulmonary hypertension (H)     Hereditary hemochromatosis (H)     Oropharyngeal dysphagia     Articulation disorder     Cardiac dysrhythmia     Disease of blood and blood forming organ     Personal history of PE (pulmonary embolism)     Complications of bone marrow transplant (H)     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)     Renal failure, chronic     Diabetes mellitus type 1 (H)     Hypotension     Essential hypertension     Hyperlipidemia     PAF (paroxysmal atrial fibrillation) (H)     Total knee replacement status     Parkinson disease (H)       Past Surgical History:    Past Surgical History:   Procedure Laterality Date     ANESTHESIA CARDIOVERSION  4/21/2011    Procedure:ANESTHESIA CARDIOVERSION; Surgeon:GENERIC ANESTHESIA PROVIDER; Location:[facility] OR     ARTHROSCOPY KNEE RT/LT      LT     CATARACT IOL, RT/LT  2017     COLONOSCOPY  10/16/2012    Procedure: COLONOSCOPY;  Colonoscopy, screening;  Surgeon: Reg Feliciano MD;  Location: [facility] OR     H ABLATION FOCAL AFIB  3/5/2013    PVI     H ABLATION FOCAL AFIB  01/01/2018     HC BONE  MARROW/STEM TRANSPLANT ALLOGENIC  5/8/2007     INSERT PORT VASCULAR ACCESS       JOINT REPLACEMTN, KNEE RT/LT  3/28/12    SHAWN     VASECTOMY  1990       Past Medical History:   Past Medical History:   Diagnosis Date     Antiphospholipid antibody syndrome (H) 2005    Ravi's     Articulation disorder 9/23/2020     Atrial fibrillation (H) 4/2011     Cirrhosis (H)      CKD (chronic kidney disease) stage 2, GFR 60-89 ml/min      Diabetes mellitus type II     steroid induced     DJD (degenerative joint disease) of knee     SHAWN, worse on right     DVT (deep venous thrombosis) (H)      ED (erectile dysfunction)      Gallbladder polyp 5/2010     Xiguf-Mnxqrp-Ghcr Disease 2007    BMT     Hemochromatosis      Hodgkin's disease NOS     5 cycles of ABVD in 2011     HTN (hypertension)      Hyperlipidaemia LDL goal <100      Multiple thyroid nodules     benign      Myeloproliferative disorder (H)     atypical, [facility]     Parkinson disease (H) 9/24/2020     PE (pulmonary embolism) 11/2004     Polyneuropathy      Primary pulmonary hypertension (H)        Social History:   Social History     Tobacco Use     Smoking status: Never Smoker     Smokeless tobacco: Never Used   Substance Use Topics     Alcohol use: No       Family History:   Family History   Problem Relation Age of Onset     Hypertension Mother      Cerebrovascular Disease Mother      Breast Cancer Mother      Arrhythmia Brother      Arrhythmia Sister         supraventricular tachycardia     Thyroid Disease Sister      Other - See Comments Son         lives in [place]. no kids and not .     Obsessive Compulsive Disorder Son      Depression Son      Eating Disorder Son         eats when depressed     Obesity Son      Thyroid Cancer Daughter         had thyroid removed     Bipolar Disorder Daughter      Other - See Comments Daughter         lives in [place] - single with one son 9  yrs     Other - See Comments Sister      Alzheimer Disease Father       Diabetes Paternal Aunt      Gastrointestinal Disease Maternal Grandmother         colitis      C.A.D. No family hx of        Allergies:   Allergies   Allergen Reactions     No Known Drug Allergy        Active Medications:   Current Outpatient Medications   Medication Sig Dispense Refill     acetaminophen (TYLENOL) 500 MG tablet 500mg tab as needed       calcium carbonate (TUMS) 500 MG chewable tablet 1 tab by mouth in morning or at night as needed       flecainide (TAMBOCOR) 50 MG tablet Take 1 tablet (50 mg) by mouth 2 times daily 180 tablet 3     folic acid (FOLVITE) 1 MG tablet Take 1 tablet (1,000 mcg) by mouth daily 90 tablet 3     metoprolol tartrate (LOPRESSOR) 25 MG tablet TAKE 1 TABLET BY MOUTH TWICE A  tablet 3     multivitamin (CENTRUM SILVER) tablet Take 1 tablet by mouth daily       pantoprazole (PROTONIX) 20 MG EC tablet TAKE 1 TABLET (20 MG) BY MOUTH AT BEDTIME 90 tablet 3     PREVIDENT 5000 DRY MOUTH 1.1 % GEL topical gel Take by mouth daily  3     Pseudoeph-Doxylamine-DM-APAP (NYQUIL PO) Take by mouth as needed       simvastatin (ZOCOR) 10 MG tablet Take 1 tablet (10 mg) by mouth daily 90 tablet 3     warfarin ANTICOAGULANT (COUMADIN) 2.5 MG tablet TAKE 1.25 MG (1/2 TABLET) MON/FRI AND 2.5 MG (1 TABLET) ALL OTHER DAYS 72 tablet 1     amoxicillin (AMOXIL) 500 MG capsule 4 x 500mg tabs by mouth 1 hour before dental appointments.       NONFORMULARY Nasal spry as needed         Systemic Review:   CONSTITUTIONAL: NEGATIVE for fever, chills, change in weight  ENT/MOUTH: NEGATIVE for ear, mouth and throat problems  RESP: NEGATIVE for significant cough or SOB  CV: NEGATIVE for chest pain, palpitations or peripheral edema    Physical Examination:   Vital Signs: /81   Pulse 63   Temp 97.7  F (36.5  C) (Oral)   Resp 18   Ht 1.829 m (6')   Wt 79.4 kg (175 lb)   SpO2 99%   BMI 23.73 kg/m    GENERAL: healthy, alert and no distress  NECK: no adenopathy, no asymmetry, masses, or scars  RESP:  lungs clear to auscultation - no rales, rhonchi or wheezes  CV: regular rate and rhythm, normal S1 S2, no S3 or S4, no murmur, click or rub, no peripheral edema and peripheral pulses strong  ABDOMEN: soft, nontender, no hepatosplenomegaly, no masses and bowel sounds normal  MS: no gross musculoskeletal defects noted, no edema    Plan: Appropriate to proceed as scheduled.      Raul Sawyer DO  10/7/2020    PCP:  Anya Nowak

## 2020-10-07 NOTE — ANESTHESIA CARE TRANSFER NOTE
Patient: Haresh Rock    Procedure(s):  ESOPHAGOGASTRODUODENOSCOPY, WITH BIOPSY    Diagnosis: Oropharyngeal dysphagia [R13.12]  Diagnosis Additional Information: No value filed.    Anesthesia Type:   MAC     Note:  Airway :Room Air  Patient transferred to:Phase II  Comments: 89/52  HR: 60  SpO2: 94%  RR: 13  T: 97F    Report to RN. Handoff Report: Identifed the Patient, Identified the Reponsible Provider, Reviewed the pertinent medical history, Discussed the surgical course, Reviewed Intra-OP anesthesia mangement and issues during anesthesia, Set expectations for post-procedure period and Allowed opportunity for questions and acknowledgement of understanding      Vitals: (Last set prior to Anesthesia Care Transfer)    CRNA VITALS  10/7/2020 1210 - 10/7/2020 1244      10/7/2020             Pulse:  55    SpO2:  96 %                Electronically Signed By: DEJAH Acosta CRNA  October 7, 2020  12:44 PM

## 2020-10-07 NOTE — ANESTHESIA PREPROCEDURE EVALUATION
Anesthesia Evaluation     . Pt has had prior anesthetic. Type: General    No history of anesthetic complications          ROS/MED HX    ENT/Pulmonary:       Neurologic:     (+)neuropathy     Cardiovascular:     (+) hypertension----. Taking blood thinners : . . . :. .       METS/Exercise Tolerance:     Hematologic:     (+) History of blood clots History of Transfusion -      Musculoskeletal:         GI/Hepatic:     (+) liver disease,       Renal/Genitourinary:     (+) chronic renal disease, type: CRI,       Endo:     (+) type II DM thyroid problem .      Psychiatric:         Infectious Disease:         Malignancy:         Other:                                    Anesthesia Plan      History & Physical Review  History and physical reviewed and following examination; no interval change.    ASA Status:  3 .    NPO Status:  > 8 hours    Plan for MAC with Intravenous induction. Maintenance will be Balanced.      Additional equipment: Arterial Line        Postoperative Care      Consents           Allergies   Allergen Reactions     No Known Drug Allergy      Prescription Medications as of 10/7/2020       Rx Number Disp Refills Start End Last Dispensed Date Next Fill Date Owning Pharmacy    acetaminophen (TYLENOL) 500 MG tablet        [facility]/pharmacy [address] [place] [address]    Simg tab as needed    Class: Historical    amoxicillin (AMOXIL) 500 MG capsule     2020    [facility]/pharmacy [address] [place] [address]    Si x 500mg tabs by mouth 1 hour before dental appointments.    Class: Historical    calcium carbonate (TUMS) 500 MG chewable tablet        [facility]/pharmacy [address] [place] [address]    Si tab by mouth in morning or at night as needed    Class: Historical    flecainide (TAMBOCOR) 50 MG tablet  180 tablet 3 10/30/2019    [facility]/pharmacy [address] [place] [address]    Sig: Take 1 tablet (50 mg) by mouth 2 times daily    Class: E-Prescribe     Route: Oral    folic acid (FOLVITE) 1 MG tablet  90 tablet 3 4/2/2020    [facility]/pharmacy [address] [place] [address], [address]    Sig: Take 1 tablet (1,000 mcg) by mouth daily    Class: E-Prescribe    Route: Oral    metoprolol tartrate (LOPRESSOR) 25 MG tablet  180 tablet 3 7/13/2020    [facility]/pharmacy [address] [place] [address], [address]    Sig: TAKE 1 TABLET BY MOUTH TWICE A DAY    Class: E-Prescribe    multivitamin (CENTRUM SILVER) tablet            Sig: Take 1 tablet by mouth daily    Class: Historical    Route: Oral    NONFORMULARY        [facility]/pharmacy [address] [place] [address], [address]    Sig: Nasal spry as needed    Class: Historical    pantoprazole (PROTONIX) 20 MG EC tablet  90 tablet 3 2/6/2020    [facility]/pharmacy [address] [place] [address], [address]    Sig: TAKE 1 TABLET (20 MG) BY MOUTH AT BEDTIME    Class: E-Prescribe    Route: Oral    PREVIDENT 5000 DRY MOUTH 1.1 % GEL topical gel   3 2/14/2017        Sig: Take by mouth daily    Class: Historical    Route: Oral    Pseudoeph-Doxylamine-DM-APAP (NYQUIL PO)            Sig: Take by mouth as needed    Class: Historical    Route: Oral    simvastatin (ZOCOR) 10 MG tablet  90 tablet 3 7/6/2020    [facility]/pharmacy [address] [place] [address]    Sig: Take 1 tablet (10 mg) by mouth daily    Class: E-Prescribe    Route: Oral    warfarin ANTICOAGULANT (COUMADIN) 2.5 MG tablet  72 tablet 1 8/3/2020    [facility]/pharmacy [address] [place] [address]    Sig: TAKE 1.25 MG (1/2 TABLET) MON/FRI AND 2.5 MG (1 TABLET) ALL OTHER DAYS    Class: E-Prescribe      Hospital Medications as of 10/7/2020       Dose Frequency Start End    acetaminophen (TYLENOL) tablet 975 mg 975 mg ONCE 10/7/2020     Admin Instructions: Maximum acetaminophen dose from all sources = 75 mg/kg/day not to exceed 4 grams/day.    Class: E-Prescribe    Route: Oral    lactated ringers infusion  CONTINUOUS PRN 10/7/2020     Route: Intravenous    lidocaine  "(LMX4) kit  EVERY 1 HOUR PRN 10/7/2020     Admin Instructions: Do NOT give if patient has a history of allergy to any local anesthetic or any \"renetta\" product. Apply at least 30 minutes prior to VAD insertion or port access. In divided doses as needed for size of site for insertion with MAX Dose:  2.5 g (  of 5 g tube)    Class: E-Prescribe    Route: Topical    lidocaine 1 % 0.1-1 mL 0.1-1 mL EVERY 1 HOUR PRN 10/7/2020     Admin Instructions: Do NOT give if patient has a history of allergy to any local anesthetic or any \"renetta\" product. MAX dose 1 mL subcutaneous OR intradermal in divided doses as needed for VAD insertion.    Class: E-Prescribe    Route: Other    lidocaine 2% injection (MDV)  PRN 10/7/2020     Route: Intravenous    No abx ordered pre-op  PRN 10/7/2020     ondansetron (ZOFRAN) injection 4 mg (Completed) 4 mg ONCE PRN 10/7/2020 10/7/2020    Admin Instructions: Give in ENDO pre procedure prep area.Irritant. For ordered IV doses 0.1-4 mg, give IV Push undiluted over 2-5 minutes.    Class: E-Prescribe    Route: Intravenous    propofol (DIPRIVAN) infusion  CONTINUOUS PRN 10/7/2020     Route: Intravenous    propofol (DIPRIVAN) injection 10 mg/mL vial  PRN 10/7/2020     sodium chloride (PF) 0.9% PF flush 3 mL 3 mL EVERY 1 MIN PRN 10/7/2020     Class: E-Prescribe    Route: Intracatheter    sodium chloride (PF) 0.9% PF flush 3 mL 3 mL EVERY 8 HOURS 10/7/2020     Admin Instructions: And Q1H PRN, to lock peripheral IV dormant line.    Class: E-Prescribe    Route: Intracatheter        Recent Results (from the past 4320 hour(s))   Echo complete with definity    Narrative    [id number]  [id number]  [id number]  [id number]^DEVENDRA^REYNA^JANAK           [facility]  Diagnostic and Treamtent-3rd Floor  [address] [place] [address]     Name: PAMELA GRANADOS  MRN: [id number]  : 1950  Study Date: 2017 08:30 AM  Age: 67 yrs  Gender: Male  Patient Location: " [facility]  Reason For Study: , Paroxysmal atrial fibrillation (H)  Ordering Physician: REYNA RAMSAY  Referring Physician: REYNA RAMSAY  Performed By: Donal Sy RDCS     BSA: 2.2 m2  Height: 72 in  Weight: 208 lb  HR: 60  BP: 127/71 mmHg  _____________________________________________________________________________  __        Procedure  Echocardiogram with two-dimensional, color and spectral Doppler performed.  Contrast Definity. Definity (NDC #24364-863-83) given intravenously. Patient  was given 5ml mixture of 1.5ml Definity and 8.5ml saline. 5 ml wasted. IV  start location RAC .  _____________________________________________________________________________  __        Interpretation Summary  Normal left ventricular size, thickness, and systolic function. EF 55-60%.  Mild right ventricular dilation with normal function.  Moderate to severe calcification of the aortic valve with mild stenosis and  mild insufficiency. Low flow-low gradient AS should be considered, aortic  valve area is 1.6 cm2 and stroke volume is mildly reduced.  No pericardial effusion.     Compared to the prior study 3/4/2013, the aortic valve appears more calcified.  _____________________________________________________________________________  __        Left Ventricle  Left ventricular size is normal. Left ventricular wall thickness is normal.  Global and regional left ventricular function is normal with an EF of 55-60%.  Normal left ventricular filling for age.     Right Ventricle  Global right ventricular function is normal. Mild right ventricular dilation  is present.     Atria  Both atria appear normal.     Mitral Valve  Mild mitral annular calcification is present. Trace to mild mitral  insufficiency is present.        Aortic Valve  Moderate to severe aortic valve sclerosis is present. Mild aortic  insufficiency is present. The calculated aortic valve are is 1.6 cm^2.     Tricuspid Valve  Mild tricuspid insufficiency is  present. Right ventricular systolic pressure  is 27mmHg above the right atrial pressure.     Pulmonic Valve  The pulmonic valve is normal.     Vessels  The aorta root is normal. The inferior vena cava cannot be assessed.     Pericardium  No pericardial effusion is present.        Attestation  I have personally viewed the imaging and agree with the interpretation and  report as documented by the fellow, Jens Corley, and/or edited by me.  _____________________________________________________________________________  __  MMode/2D Measurements & Calculations  IVSd: 0.92 cm     LVIDd: 4.9 cm  LVIDs: 2.9 cm  LVPWd: 0.71 cm  FS: 40.1 %  EDV(Teich): 111.0 ml  ESV(Teich): 32.7 ml  LV mass(C)d: 133.4 grams  LV mass(C)dI: 61.6 grams/m2  Ao root diam: 3.2 cm  LA dimension: 3.7 cm  asc Aorta Diam: 3.0 cm  LA/Ao: 1.2  LVOT diam: 2.2 cm  LVOT area: 3.8 cm2  LA Volume (BP): 67.0 ml  LA Volume Index (BP): 30.9 ml/m2  RWT: 0.29           Doppler Measurements & Calculations  MV E max smooth: 52.6 cm/sec  MV A max smooth: 48.0 cm/sec  MV E/A: 1.1  MV dec time: 0.14 sec  Ao V2 max: 182.3 cm/sec  Ao max P.0 mmHg  Ao V2 mean: 127.0 cm/sec  Ao mean P.3 mmHg  Ao V2 VTI: 43.6 cm  ELIANE(I,D): 1.6 cm2  ELIANE(V,D): 1.5 cm2  LV V1 max P.2 mmHg  LV V1 max: 74.1 cm/sec  LV V1 VTI: 18.3 cm  CO(LVOT): 4.1 l/min  CI(LVOT): 1.9 l/min/m2  SV(LVOT): 69.5 ml  SI(LVOT): 32.1 ml/m2  PA V2 max: 104.5 cm/sec  PA max P.4 mmHg  PA acc time: 0.07 sec  TR max smooth: 258.8 cm/sec  TR max P.8 mmHg  Pulm Sys Smooth: 33.1 cm/sec  Pulm Lund Smooth: 22.7 cm/sec  Pulm S/D: 1.5  ELIANE Index (cm2/m2): 0.74  E/E' av.7  Lateral E/e': 6.3  Medial E/e': 7.1        _____________________________________________________________________________  __        Report approved by: Del Martinez 2017 10:45 AM        PAST MEDICAL HISTORY:   Past Medical History:   Diagnosis Date     Antiphospholipid antibody syndrome (H)     Ravi's     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     Atrial fibrillation (H) 4/2011     Cirrhosis (H)      CKD (chronic kidney disease) stage 2, GFR 60-89 ml/min      Diabetes mellitus type II     steroid induced     DJD (degenerative joint disease) of knee     SHAWN, worse on right     DVT (deep venous thrombosis) (H)      ED (erectile dysfunction)      Gallbladder polyp 5/2010     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     Hemochromatosis      Hodgkin's disease NOS     5 cycles of ABVD in 2011     HTN (hypertension)      Hyperlipidaemia LDL goal <100      Multiple thyroid nodules     benign      Myeloproliferative disorder (H)     atypical, [facility]     Parkinson disease (H) 9/24/2020     PE (pulmonary embolism) 11/2004     Polyneuropathy      Primary pulmonary hypertension (H)        PAST SURGICAL HISTORY:   Past Surgical History:   Procedure Laterality Date     ANESTHESIA CARDIOVERSION  4/21/2011    Procedure:ANESTHESIA CARDIOVERSION; Surgeon:GENERIC ANESTHESIA PROVIDER; Location:[facility] OR     ARTHROSCOPY KNEE RT/LT      LT     CATARACT IOL, RT/LT  2017     COLONOSCOPY  10/16/2012    Procedure: COLONOSCOPY;  Colonoscopy, screening;  Surgeon: Reg Feliciano MD;  Location: [facility] OR     H ABLATION FOCAL AFIB  3/5/2013    PVI     H ABLATION FOCAL AFIB  01/01/2018     HC BONE MARROW/STEM TRANSPLANT ALLOGENIC  5/8/2007     INSERT PORT VASCULAR ACCESS       JOINT REPLACEMTN, KNEE RT/LT  3/28/12    SHAWN     VASECTOMY  1990       FAMILY HISTORY:   Family History   Problem Relation Age of Onset     Hypertension Mother      Cerebrovascular Disease Mother      Breast Cancer Mother      Arrhythmia Brother      Arrhythmia Sister         supraventricular tachycardia     Thyroid Disease Sister      Other - See Comments Son         lives in [place]. no kids and not .     Obsessive Compulsive Disorder Son      Depression Son      Eating Disorder Son         eats when depressed     Obesity Son      Thyroid Cancer Daughter         had thyroid removed     Bipolar  Disorder Daughter      Other - See Comments Daughter         lives in [place] - single with one son 9  yrs     Other - See Comments Sister      Alzheimer Disease Father      Diabetes Paternal Aunt      Gastrointestinal Disease Maternal Grandmother         colitis      C.A.D. No family hx of        SOCIAL HISTORY:   Social History     Tobacco Use     Smoking status: Never Smoker     Smokeless tobacco: Never Used   Substance Use Topics     Alcohol use: No     Lab Results   Component Value Date    WBC 7.7 07/20/2020    HGB 16.5 07/20/2020    HCT 45.8 07/20/2020     (L) 07/20/2020    CHOL 121 08/20/2020    TRIG 131 08/20/2020    HDL 31 (L) 08/20/2020    ALT 19 08/05/2020    AST 29 08/05/2020     08/05/2020    BUN 20 08/05/2020    CO2 29 08/05/2020    TSH 2.50 07/20/2020    INR 2.5 (A) 10/02/2020     (Not in a hospital admission)        PHYSICAL EXAM:   Mental Status/Neuro: A/A/O   Airway: Facies: Feasible  Mallampati: I  Mouth/Opening: Full  TM distance: > 6 cm  Neck ROM: Full   Respiratory: Auscultation: CTAB     Resp. Rate: Normal     Resp. Effort: Normal      CV: Rhythm: Regular  Rate: Age appropriate  Heart: Normal Sounds  Edema: None   Comments:      Dental: Normal Dentition                Assessment:   ASA SCORE: 3    H&P: History and physical reviewed and following examination; no interval change.   Smoking Status:  Non-Smoker/Unknown   NPO Status: NPO Appropriate     Plan:   Anes. Type:  MAC   Pre-Medication: None   Induction:  N/a   Airway: Native Airway   Access/Monitoring: PIV   Maintenance: N/a     Postop Plan:   Postop Pain: None  Postop Sedation/Airway: Not planned  Disposition: Outpatient     PONV Management:   Adult Risk Factors:, Non-Smoker   Prevention: Ondansetron, Dexamethasone     CONSENT: Direct conversation   Plan and risks discussed with: Patient

## 2020-10-07 NOTE — ANESTHESIA POSTPROCEDURE EVALUATION
Anesthesia POST Procedure Evaluation    Patient: Haresh Rock   MRN:     [id number] Gender:   male   Age:    70 year old :      1950        Preoperative Diagnosis: Oropharyngeal dysphagia [R13.12]   Procedure(s):  ESOPHAGOGASTRODUODENOSCOPY, WITH BIOPSY   Postop Comments: No value filed.     Anesthesia Type: MAC       Disposition: Outpatient   Postop Pain Control: Uneventful            Sign Out: Well controlled pain   PONV: No   Neuro/Psych: Uneventful            Sign Out: Acceptable/Baseline neuro status   Airway/Respiratory: Uneventful            Sign Out: Acceptable/Baseline resp. status   CV/Hemodynamics: Uneventful            Sign Out: Acceptable CV status   Other NRE: NONE   DID A NON-ROUTINE EVENT OCCUR? No         Last Anesthesia Record Vitals:  CRNA VITALS  10/7/2020 1210 - 10/7/2020 1256      10/7/2020             Pulse:  55    SpO2:  96 %          Last PACU Vitals:  Vitals Value Taken Time   BP     Temp 36.1  C (97  F) 10/07/20 1242   Pulse 60 10/07/20 1242   Resp 16 10/07/20 1242   SpO2 96 % 10/07/20 1242   Temp src     NIBP     Pulse     SpO2     Resp     Temp     Ht Rate     Temp 2           Electronically Signed By: Gil Xiong MD, MD, 2020, 12:56 PM

## 2020-10-08 ENCOUNTER — TELEPHONE (OUTPATIENT)
Dept: NUCLEAR MEDICINE | Facility: CLINIC | Age: 70
End: 2020-10-08

## 2020-10-08 LAB — COPATH REPORT: NORMAL

## 2020-10-09 ENCOUNTER — TRANSFERRED RECORDS (OUTPATIENT)
Dept: HEALTH INFORMATION MANAGEMENT | Facility: CLINIC | Age: 70
End: 2020-10-09

## 2020-10-09 ENCOUNTER — ANTICOAGULATION THERAPY VISIT (OUTPATIENT)
Dept: FAMILY MEDICINE | Facility: CLINIC | Age: 70
End: 2020-10-09

## 2020-10-09 DIAGNOSIS — D68.61 ANTIPHOSPHOLIPID ANTIBODY SYNDROME (H): ICD-10-CM

## 2020-10-09 DIAGNOSIS — Z79.01 LONG TERM CURRENT USE OF ANTICOAGULANT THERAPY: ICD-10-CM

## 2020-10-09 DIAGNOSIS — Z86.718 PERSONAL HISTORY OF DVT (DEEP VEIN THROMBOSIS): ICD-10-CM

## 2020-10-09 DIAGNOSIS — I48.91 ATRIAL FIBRILLATION (H): ICD-10-CM

## 2020-10-09 LAB — INR PPP: 1.2 (ref 0.9–1.1)

## 2020-10-09 NOTE — PROGRESS NOTES
ANTICOAGULATION MANAGEMENT     Patient Name:  Haresh Rock  Date:  10/9/2020    ASSESSMENT /SUBJECTIVE:    Today's INR result of 1.2 is subtherapeutic. Goal INR of 2.0-3.0      Warfarin dose taken: Warfarin recently held for EGD which may be affecting INR. Patient reports that he decided to take a 2.5mg dose today instead of his planned 1.25mg since INR was so low, ACN in agreement with this adjustment.     Diet: No new diet changes affecting INR    Medication changes/ interactions: No new medications/supplements affecting INR    Previous INR: Therapeutic     S/S of bleeding or thromboembolism: No    New injury or illness: No    Upcoming surgery, procedure or cardioversion: No    Additional findings: hold orders appear to have come from GI, [facility] was unaware of the EGD this week      PLAN:    Telephone call with Haresh regarding INR result and instructed:     Warfarin Dosing Instructions: Continue your current warfarin dose      1.25 mg every Mon, Fri; 2.5 mg all other days         Instructed patient to follow up no later than: 1 week  Patient to recheck with home meter    Education provided: Target INR goal and significance of current INR result, Monitoring for clotting signs and symptoms and Importance of notifying clinic of upcoming surgeries and procedures 2 weeks in advance      Haresh verbalizes understanding and agrees to warfarin dosing plan.    Instructed to call the Anticoagulation Clinic for any changes, questions or concerns. (#[phone])        Nina Ventura RN      OBJECTIVE:  Recent labs: (last 7 days)     10/09/20   INR 1.2*         No question data found.  Anticoagulation Summary  As of 10/9/2020    INR goal:  2.0-3.0   TTR:  90.7 % (1 y)   INR used for dosin.2 (10/9/2020)   Warfarin maintenance plan:  1.25 mg (2.5 mg x 0.5) every Mon, Fri; 2.5 mg (2.5 mg x 1) all other days   Full warfarin instructions:  10/9: 2.5 mg; Otherwise 1.25 mg every Mon, Fri; 2.5 mg all other days   Weekly warfarin  total:  15 mg   Plan last modified:  Laverne Ferguson RN (11/14/2018)   Next INR check:  10/16/2020   Priority:  High   Target end date:  Indefinite    Indications    Long-term (current) use of anticoagulants [Z79.01] [Z79.01]  Atrial fibrillation (H) [I48.91]  Antiphospholipid antibody syndrome (H) [D68.61]  Personal history of DVT (deep vein thrombosis) [Z86.718]  Atrial fibrillation  unspecified type (H) (Resolved) [I48.91]             Anticoagulation Episode Summary     INR check location:      Preferred lab:  EXTERNAL LAB    Send INR reminders to:  CHELSEA CHILEL    Comments:  JESSICA rodas to manage by exception      Anticoagulation Care Providers     Provider Role Specialty Phone number    Anya Nowak MD [facility] [phone]

## 2020-10-12 DIAGNOSIS — A04.8 H. PYLORI INFECTION: Primary | ICD-10-CM

## 2020-10-12 RX ORDER — METRONIDAZOLE 500 MG/1
500 TABLET ORAL 4 TIMES DAILY
Qty: 56 TABLET | Refills: 0 | Status: SHIPPED | OUTPATIENT
Start: 2020-10-12 | End: 2020-10-26

## 2020-10-12 RX ORDER — OMEPRAZOLE 40 MG/1
40 CAPSULE, DELAYED RELEASE ORAL 2 TIMES DAILY
Qty: 28 CAPSULE | Refills: 0 | Status: SHIPPED | OUTPATIENT
Start: 2020-10-12 | End: 2020-10-26

## 2020-10-12 RX ORDER — TETRACYCLINE HYDROCHLORIDE 500 MG/1
500 CAPSULE ORAL 4 TIMES DAILY
Qty: 56 CAPSULE | Refills: 0 | Status: SHIPPED | OUTPATIENT
Start: 2020-10-12 | End: 2020-10-26

## 2020-10-13 ENCOUNTER — DOCUMENTATION ONLY (OUTPATIENT)
Dept: NURSING | Facility: CLINIC | Age: 70
End: 2020-10-13

## 2020-10-13 NOTE — PROGRESS NOTES
ANTICOAGULATION  MANAGEMENT     Interacting Medication Review    Interacting medication(s): Metronidazole (Flagyl) and Omeprazole and tetracycline with warfarin.    Duration: 14 days  (10/12 to 10/26)    New medication?: Yes, interaction may increase INR and risk of bleeding       PLAN     Continue current warfarin dose. Recommend to check INR on 10/16    Patient was not contacted    No adjustment to Anticoagulation Calendar was required    Laverne Ferguson RN

## 2020-10-16 ENCOUNTER — ANTICOAGULATION THERAPY VISIT (OUTPATIENT)
Dept: FAMILY MEDICINE | Facility: CLINIC | Age: 70
End: 2020-10-16

## 2020-10-16 ENCOUNTER — THERAPY VISIT (OUTPATIENT)
Dept: SPEECH THERAPY | Facility: CLINIC | Age: 70
End: 2020-10-16
Payer: COMMERCIAL

## 2020-10-16 DIAGNOSIS — R13.12 OROPHARYNGEAL DYSPHAGIA: Primary | ICD-10-CM

## 2020-10-16 DIAGNOSIS — Z79.01 LONG TERM CURRENT USE OF ANTICOAGULANT THERAPY: ICD-10-CM

## 2020-10-16 DIAGNOSIS — I48.0 PAROXYSMAL ATRIAL FIBRILLATION (H): ICD-10-CM

## 2020-10-16 DIAGNOSIS — D68.61 ANTIPHOSPHOLIPID ANTIBODY SYNDROME (H): ICD-10-CM

## 2020-10-16 DIAGNOSIS — Z86.718 PERSONAL HISTORY OF DVT (DEEP VEIN THROMBOSIS): ICD-10-CM

## 2020-10-16 LAB — INR PPP: 1.7 (ref 0.9–1.1)

## 2020-10-16 PROCEDURE — 92526 ORAL FUNCTION THERAPY: CPT | Mod: GN | Performed by: SPEECH-LANGUAGE PATHOLOGIST

## 2020-10-16 NOTE — PROGRESS NOTES
ANTICOAGULATION MANAGEMENT     Patient Name:  Haresh Rock  Date:  10/16/2020    ASSESSMENT /SUBJECTIVE:    Today's INR result of 1.7 is subtherapeutic. Goal INR of 2.0-3.0      Warfarin dose taken: Warfarin taken as instructed    Diet: No new diet changes affecting INR    Medication changes/ interactions: No new medications/supplements affecting INR    Previous INR: Subtherapeutic     S/S of bleeding or thromboembolism: No    New injury or illness: Yes Just Dx with H. Pylori.     Upcoming surgery, procedure or cardioversion: No    Additional findings: New dx of H. Pylori will be starting medications in a day or so.       PLAN:    Telephone call with Haresh regarding INR result and instructed:     Warfarin Dosing Instructions: boost dose of 2.5 mg then continue your current warfarin dose of 1.25 mg Mon Fri; 2.5 mg all other days    Instructed patient to follow up no later than: 5 days  Patient to recheck with home meter    Education provided: Monitoring for bleeding signs and symptoms and Monitoring for clotting signs and symptoms      Haresh,  verbalizes understanding and agrees to warfarin dosing plan.    Instructed to call the Anticoagulation Clinic for any changes, questions or concerns. (#[phone])        Allyn Doe, KOSTAS      OBJECTIVE:  Recent labs: (last 7 days)     10/16/20   INR 1.7*         No question data found.  Anticoagulation Summary  As of 10/16/2020    INR goal:  2.0-3.0   TTR:  88.8 % (1 y)   INR used for dosin.7 (10/16/2020)   Warfarin maintenance plan:  1.25 mg (2.5 mg x 0.5) every Mon, Fri; 2.5 mg (2.5 mg x 1) all other days   Full warfarin instructions:  10/16: 2.5 mg; Otherwise 1.25 mg every Mon, Fri; 2.5 mg all other days   Weekly warfarin total:  15 mg   Plan last modified:  Laverne Ferguson RN (2018)   Next INR check:  10/21/2020   Priority:  High   Target end date:  Indefinite    Indications    Long-term (current) use of anticoagulants [Z79.01] [Z79.01]  Atrial fibrillation  (H) [I48.91]  Antiphospholipid antibody syndrome (H) [D68.61]  Personal history of DVT (deep vein thrombosis) [Z86.718]  Atrial fibrillation  unspecified type (H) (Resolved) [I48.91]             Anticoagulation Episode Summary     INR check location:      Preferred lab:  EXTERNAL LAB    Send INR reminders to:  CHELSEA CHILEL    Comments:  JESSICA villafuerteay to manage by exception      Anticoagulation Care Providers     Provider Role Specialty Phone number    Anya Nowak MD [place] [phone]

## 2020-10-19 DIAGNOSIS — R13.10 DYSPHAGIA, UNSPECIFIED TYPE: Primary | ICD-10-CM

## 2020-10-20 DIAGNOSIS — D68.61 ANTIPHOSPHOLIPID ANTIBODY SYNDROME (H): ICD-10-CM

## 2020-10-20 NOTE — TELEPHONE ENCOUNTER
INR   Date Value Ref Range Status   10/16/2020 1.7 (A) 0.90 - 1.10 Final      Routing refill request to provider for review/approval because:  Drug interaction warning        Sherri Barba BSN, RN

## 2020-10-21 RX ORDER — WARFARIN SODIUM 2.5 MG/1
TABLET ORAL
Qty: 90 TABLET | Refills: 0 | Status: SHIPPED | OUTPATIENT
Start: 2020-10-21 | End: 2021-02-12

## 2020-10-22 ENCOUNTER — DOCUMENTATION ONLY (OUTPATIENT)
Dept: FAMILY MEDICINE | Facility: CLINIC | Age: 70
End: 2020-10-22

## 2020-10-23 ENCOUNTER — ANTICOAGULATION THERAPY VISIT (OUTPATIENT)
Dept: FAMILY MEDICINE | Facility: CLINIC | Age: 70
End: 2020-10-23

## 2020-10-23 DIAGNOSIS — Z79.01 LONG TERM CURRENT USE OF ANTICOAGULANT THERAPY: ICD-10-CM

## 2020-10-23 DIAGNOSIS — D68.61 ANTIPHOSPHOLIPID ANTIBODY SYNDROME (H): ICD-10-CM

## 2020-10-23 DIAGNOSIS — I48.0 PAROXYSMAL ATRIAL FIBRILLATION (H): ICD-10-CM

## 2020-10-23 DIAGNOSIS — Z86.718 PERSONAL HISTORY OF DVT (DEEP VEIN THROMBOSIS): ICD-10-CM

## 2020-10-23 LAB — INR PPP: 2.7 (ref 0.9–1.1)

## 2020-10-23 NOTE — PROGRESS NOTES
ANTICOAGULATION MANAGEMENT     Patient Name:  Haresh Rock  Date:  10/23/2020    ASSESSMENT /SUBJECTIVE:    Today's INR result of 2.7 is therapeutic. Goal INR of 2.0-3.0      Warfarin dose taken: Warfarin taken as instructed    Diet: No new diet changes affecting INR    Medication changes/ interactions: StaredH.pylori medication yesterday 10/22: bismuth subsalicylate & metroNIDAZOLE & omeprazole & tetracycline     Previous INR: Subtherapeutic     S/S of bleeding or thromboembolism: No    New injury or illness: No    Upcoming surgery, procedure or cardioversion: No    Additional findings: None      PLAN:    Telephone call with Haresh regarding INR result and instructed:     Warfarin Dosing Instructions: Continue your current warfarin dose 1.25mg Mon/Fri & 2.5mg AOD    Instructed patient to follow up no later than: Monday 10/26  Patient to recheck with home meter    Education provided: Please call back if any changes to your diet, medications or how you've been taking warfarin and Contact the anticoagulation clinic with any changes, questions or concerns at #[phone]       Instructed to call the Anticoagulation Clinic for any changes, questions or concerns. (#[phone])        Macrina Preston RN      OBJECTIVE:  Recent labs: (last 7 days)     10/23/20   INR 2.7*         No question data found.  Anticoagulation Summary  As of 10/23/2020    INR goal:  2.0-3.0   TTR:  88.2 % (1 y)   INR used for dosin.7 (10/23/2020)   Warfarin maintenance plan:  1.25 mg (2.5 mg x 0.5) every Mon, Fri; 2.5 mg (2.5 mg x 1) all other days   Full warfarin instructions:  1.25 mg every Mon, Fri; 2.5 mg all other days   Weekly warfarin total:  15 mg   Plan last modified:  Laverne Ferguson RN (2018)   Next INR check:  10/30/2020   Priority:  High   Target end date:  Indefinite    Indications    Long-term (current) use of anticoagulants [Z79.01] [Z79.01]  Atrial fibrillation (H) [I48.91]  Antiphospholipid antibody syndrome (H)  [D68.61]  Personal history of DVT (deep vein thrombosis) [Z86.718]  Atrial fibrillation  unspecified type (H) (Resolved) [I48.91]             Anticoagulation Episode Summary     INR check location:      Preferred lab:  EXTERNAL LAB    Send INR reminders to:  CHELSEA CHILEL    Comments:  JESSICA okay to manage by exception      Anticoagulation Care Providers     Provider Role Specialty Phone number    Anya Nowak MD [facility] [phone]         Macrina Avila RN, BSN, PHN

## 2020-10-27 ENCOUNTER — ANTICOAGULATION THERAPY VISIT (OUTPATIENT)
Dept: FAMILY MEDICINE | Facility: CLINIC | Age: 70
End: 2020-10-27

## 2020-10-27 DIAGNOSIS — Z86.718 PERSONAL HISTORY OF DVT (DEEP VEIN THROMBOSIS): ICD-10-CM

## 2020-10-27 DIAGNOSIS — I48.0 PAROXYSMAL ATRIAL FIBRILLATION (H): ICD-10-CM

## 2020-10-27 DIAGNOSIS — D68.61 ANTIPHOSPHOLIPID ANTIBODY SYNDROME (H): ICD-10-CM

## 2020-10-27 DIAGNOSIS — Z79.01 LONG TERM CURRENT USE OF ANTICOAGULANT THERAPY: ICD-10-CM

## 2020-10-27 LAB — INR PPP: 1.6 (ref 0.9–1.1)

## 2020-10-27 NOTE — PROGRESS NOTES
ANTICOAGULATION MANAGEMENT     Patient Name:  Haresh Rock  Date:  10/27/2020    ASSESSMENT /SUBJECTIVE:    Today's INR result of 1.6 is subtherapeutic. Goal INR of 2.0-3.0      Warfarin dose taken: Warfarin taken as instructed    Diet: No new diet changes affecting INR    Medication changes/ interactions: No new medications/supplements affecting INR    Previous INR: Therapeutic     S/S of bleeding or thromboembolism: No    New injury or illness: No    Upcoming surgery, procedure or cardioversion: No    Additional findings: None      PLAN:    Telephone call with Haresh regarding INR result and instructed:     Warfarin Dosing Instructions: 5 mg tonight then change your warfarin dose to 1.25 mg on  and 2.5 mg all other days    Instructed patient to follow up no later than: 1 week  Patient to recheck with home meter    Education provided: Monitoring for clotting signs and symptoms      Haresh verbalizes understanding and agrees to warfarin dosing plan.    Instructed to call the Anticoagulation Clinic for any changes, questions or concerns. (#[phone])        Blanca Rodriguez RN      OBJECTIVE:  Recent labs: (last 7 days)     10/23/20 10/26/20   INR 2.7* 1.6*         No question data found.  Anticoagulation Summary  As of 10/27/2020    INR goal:  2.0-3.0   TTR:  87.9 % (1 y)   INR used for dosin.6 (10/26/2020)   Warfarin maintenance plan:  1.25 mg (2.5 mg x 0.5) every Mon; 2.5 mg (2.5 mg x 1) all other days   Full warfarin instructions:  10/27: 5 mg; Otherwise 1.25 mg every Mon; 2.5 mg all other days   Weekly warfarin total:  16.25 mg   Plan last modified:  Blanca Rodriguez RN (10/27/2020)   Next INR check:  11/3/2020   Priority:  High   Target end date:  Indefinite    Indications    Long-term (current) use of anticoagulants [Z79.01] [Z79.01]  Atrial fibrillation (H) [I48.91]  Antiphospholipid antibody syndrome (H) [D68.61]  Personal history of DVT (deep vein thrombosis) [Z86.718]  Atrial  fibrillation  unspecified type (H) (Resolved) [I48.91]             Anticoagulation Episode Summary     INR check location:      Preferred lab:  EXTERNAL LAB    Send INR reminders to:  CHELSEA CHILEL    Comments:  JESSICA okay to manage by exception      Anticoagulation Care Providers     Provider Role Specialty Phone number    Anya Nowak MD [facility] [phone]

## 2020-10-31 LAB — INR PPP: 6.4 (ref 0.9–1.1)

## 2020-11-02 ENCOUNTER — ANTICOAGULATION THERAPY VISIT (OUTPATIENT)
Dept: FAMILY MEDICINE | Facility: CLINIC | Age: 70
End: 2020-11-02

## 2020-11-02 DIAGNOSIS — D68.61 ANTIPHOSPHOLIPID ANTIBODY SYNDROME (H): ICD-10-CM

## 2020-11-02 DIAGNOSIS — Z86.718 PERSONAL HISTORY OF DVT (DEEP VEIN THROMBOSIS): ICD-10-CM

## 2020-11-02 DIAGNOSIS — Z79.01 LONG TERM CURRENT USE OF ANTICOAGULANT THERAPY: ICD-10-CM

## 2020-11-02 DIAGNOSIS — I48.91 ATRIAL FIBRILLATION, UNSPECIFIED TYPE (H): ICD-10-CM

## 2020-11-02 DIAGNOSIS — I48.0 PAROXYSMAL ATRIAL FIBRILLATION (H): ICD-10-CM

## 2020-11-02 LAB
CAPILLARY BLOOD COLLECTION: NORMAL
INR PPP: 5.7 (ref 0.86–1.14)

## 2020-11-02 PROCEDURE — 85610 PROTHROMBIN TIME: CPT | Performed by: FAMILY MEDICINE

## 2020-11-02 PROCEDURE — 36416 COLLJ CAPILLARY BLOOD SPEC: CPT | Performed by: FAMILY MEDICINE

## 2020-11-02 NOTE — PROGRESS NOTES
ANTICOAGULATION MANAGEMENT      Patient Name:  Haresh Rock  Date:  2020    ASSESSMENT /SUBJECTIVE:    Today's INR result of 5.7 is supratherapeutic. Goal INR of 2.0-3.0      Warfarin dose taken: See notes from earlier encounter today. Patient takes his warfarin in the am, took it on Saturday, skipped it on  and forgot to take it out of his med box so took it this morning.     Diet: No new diet changes affecting INR    Medication changes/ interactions: Interaction between meds for H Pylori (see note earlier today and last week) and warfarin may be affecting INR. Will finish these meds on Thurs    Previous INR: Supratherapeutic on Saturday with home monitor    S/S of bleeding or thromboembolism: No    New injury or illness: No    Upcoming surgery, procedure or cardioversion: No    Additional findings: loose stools from H Pylori meds      PLAN:    Telephone call with Haresh regarding INR result and instructed:     Warfarin Dosing Instructions: Haresh accidentally took his coumadin this morning. Therefore instructed to hold x 2, Tue 11/3 and .    Instructed patient to follow up no later than:   Patient to recheck with home meter    Education provided: Potential interaction between warfarin and H Pylori meds, Monitoring for bleeding signs and symptoms and When to seek medical attention/emergency care      Haresh verbalizes understanding and agrees to warfarin dosing plan.    Instructed to call the Anticoagulation Clinic for any changes, questions or concerns. (#[phone])        Nina Ventura RN      OBJECTIVE:  Recent labs: (last 7 days)     10/31/20 11/02/20  1022   INR 6.4* 5.70*         No question data found.  Anticoagulation Summary  As of 2020    INR goal:  2.0-3.0   TTR:  86.2 % (1 y)   INR used for dosin.70 (2020)   Warfarin maintenance plan:  1.25 mg (2.5 mg x 0.5) every Mon; 2.5 mg (2.5 mg x 1) all other days   Full warfarin instructions:  11/3: Hold; : Hold;  Otherwise 1.25 mg every Mon; 2.5 mg all other days   Weekly warfarin total:  16.25 mg   Plan last modified:  Blanca Rodriguez RN (10/27/2020)   Next INR check:  11/5/2020   Priority:  High   Target end date:  Indefinite    Indications    Long-term (current) use of anticoagulants [Z79.01] [Z79.01]  Atrial fibrillation (H) [I48.91]  Antiphospholipid antibody syndrome (H) [D68.61]  Personal history of DVT (deep vein thrombosis) [Z86.718]  Atrial fibrillation  unspecified type (H) (Resolved) [I48.91]             Anticoagulation Episode Summary     INR check location:      Preferred lab:  EXTERNAL LAB    Send INR reminders to:  CHELSEA CHILEL    Comments:  JESSICA rodas to manage by exception      Anticoagulation Care Providers     Provider Role Specialty Phone number    Anya Nowak MD Referring Family Medicine [phone]

## 2020-11-03 ENCOUNTER — ANCILLARY PROCEDURE (OUTPATIENT)
Dept: NUCLEAR MEDICINE | Facility: CLINIC | Age: 70
End: 2020-11-03
Attending: PSYCHIATRY & NEUROLOGY
Payer: COMMERCIAL

## 2020-11-03 DIAGNOSIS — G20.C PARKINSONISM, UNSPECIFIED PARKINSONISM TYPE (H): ICD-10-CM

## 2020-11-03 PROCEDURE — 78803 RP LOCLZJ TUM SPECT 1 AREA: CPT | Mod: GC | Performed by: RADIOLOGY

## 2020-11-03 PROCEDURE — A9584 IODINE I-123 IOFLUPANE: HCPCS | Performed by: PSYCHIATRY & NEUROLOGY

## 2020-11-06 ENCOUNTER — ANTICOAGULATION THERAPY VISIT (OUTPATIENT)
Dept: FAMILY MEDICINE | Facility: CLINIC | Age: 70
End: 2020-11-06

## 2020-11-06 DIAGNOSIS — D68.61 ANTIPHOSPHOLIPID ANTIBODY SYNDROME (H): ICD-10-CM

## 2020-11-06 DIAGNOSIS — Z79.01 LONG TERM CURRENT USE OF ANTICOAGULANT THERAPY: ICD-10-CM

## 2020-11-06 DIAGNOSIS — Z86.718 PERSONAL HISTORY OF DVT (DEEP VEIN THROMBOSIS): ICD-10-CM

## 2020-11-06 DIAGNOSIS — I48.0 PAROXYSMAL ATRIAL FIBRILLATION (H): ICD-10-CM

## 2020-11-06 LAB — INR PPP: 2.9 (ref 0.9–1.1)

## 2020-11-06 NOTE — PROGRESS NOTES
ANTICOAGULATION MANAGEMENT     Patient Name:  Haresh Rock  Date:  2020    ASSESSMENT /SUBJECTIVE:    Today's INR result of 2.9 is therapeutic. Goal INR of 2.0-3.0      Warfarin dose taken: Warfarin taken as instructed    Diet: No new diet changes affecting INR    Medication changes/ interactions: Finished tetracycline    Previous INR: Supratherapeutic     S/S of bleeding or thromboembolism: No    New injury or illness: No    Upcoming surgery, procedure or cardioversion: No    Additional findings: None      PLAN:    Telephone call with Haresh regarding INR result and instructed:     Warfarin Dosing Instructions: Continue your current warfarin dose 1.25 mg on monday and 2.5 mg all other days    Instructed patient to follow up no later than: 11/10/20  Patient to recheck with home meter    Education provided: None required      Haresh verbalizes understanding and agrees to warfarin dosing plan.    Instructed to call the Anticoagulation Clinic for any changes, questions or concerns. (#[phone])        Blanca Rodriguez RN      OBJECTIVE:  Recent labs: (last 7 days)     10/31/20 11/02/20  1022 20   INR 6.4* 5.70* 2.9*         No question data found.  Anticoagulation Summary  As of 2020    INR goal:  2.0-3.0   TTR:  85.2 % (1 y)   INR used for dosin.9 (2020)   Warfarin maintenance plan:  1.25 mg (2.5 mg x 0.5) every Mon; 2.5 mg (2.5 mg x 1) all other days   Full warfarin instructions:  1.25 mg every Mon; 2.5 mg all other days   Weekly warfarin total:  16.25 mg   Plan last modified:  Blanca Rodriguez RN (10/27/2020)   Next INR check:     Priority:  Maintenance   Target end date:  Indefinite    Indications    Long-term (current) use of anticoagulants [Z79.01] [Z79.01]  Atrial fibrillation (H) [I48.91]  Antiphospholipid antibody syndrome (H) [D68.61]  Personal history of DVT (deep vein thrombosis) [Z86.718]  Atrial fibrillation  unspecified type (H) (Resolved) [I48.91]             Anticoagulation  Episode Summary     INR check location:      Preferred lab:  EXTERNAL LAB    Send INR reminders to:  CHELSEA CHILEL    Comments:  JESSICA okay to manage by exception      Anticoagulation Care Providers     Provider Role Specialty Phone number    Anya Nowak MD Referring Family Medicine [phone]

## 2020-11-08 NOTE — PROGRESS NOTES
VIDEO VISIT    Date of Visit: November 12, 2020  Name: Haresh Rock  Date of Birth 1950  [address] [place] [id number] [phone] (M)  [phone] (H)  [email or web address]  Has mychart  proxy - sheyla Ramirez - son  Gemma - daughter  Sheyla Rock  [phone]     [email or web address], [email or web address], and [email or web address]  Mine is [email or web address]    Assessment:  (G20) Parkinson disease (H)  (primary encounter diagnosis)  (F80.0) Articulation disorder. Hypophonia for the past 3-4 months. He has lost 20 lbs or so. There are no diet or eating changes.  No tremor. Right handed. Denies lack of arm swing. Has not had falls. He has had some slowness over and naps every day and is up after midnight and may get up at 7am. He has features of parkinsonism.    Dopamine scan - (datscan)  11/3/2020  A presynaptic dopaminergic deficit is demonstrated bilaterally.     ? Endoscopy to evaluate for weight loss.   Swallow study 9/22/2020  1. Multiple events of deep laryngeal penetration with thinner barium  consistencies with intermittent aspiration.   2. Please see the speech pathologist report for further details.  Linda Downey, SLP  Xray swallow 12/10/2020 scheduled with discussion with Linda 12/11/2020    He states he never was a drinker but has had liver problems which reportedly may have been due to iron accumulation in the liver.      Denies constipation   heartburn tums as needed    Had been treated for h pylori for two weeks with antibiotics.   He has lost another 10 lbs    He is ensure rarely - once per week.     Gallstones but has not gallbladder removed.  Had a gallbladder attack x 1 and is not on actigall      loud therapy  With Linda  Has not done physical therapy    Has atrial fibrillation and has had 2 ablation attempts and has had benefit from flecainide for the past 2 years.   Heart is doing well and monitoring with apple watch  Metoprolol lopressor 25mg 2/day  Flecainide tambocor 50mg  2/day      Past history of hypotension     Anticoagulated  warfarin     Hereditary hemochromatosis gene - gene that increases his risk and has not had problems with this. Had had phlebotomies x 2 in the past.     History of pulmonary embolii - back in 2004. This was related to blood clotting problems.   May have had a DVT. Reportedly has had antiphospholipid antibody. He is on coumadin.      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 Had a transplant from Dr. 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  No problems since then.      Hodgkin's lymphoma in the past 2011 and had chemotherapy for this and followed with CT scan and reportedly this is gone.      Pulmonary hypertension in the past.     Endo - cholesterol on simvastatin 10mg/day  Had diabetes in the past from prednisone  And this may have resolved  May have a thyroid nodule.  TSH was normal.      Renal function.   May have stage 2 disease.      Prostate - denies problems. He has has nocturia couple times per night.   Denies elevated psa     Smell perception has been affected since chemotherapy in 2011 and his taste is affected as well.      Denies allergies or infections.      He denies talking in his sleep or snoring. He does not sleep in the same bed as his wife who sleeps elsewhere and she is using cpap.     He states he does not have significant memory problems. He  May forget things at times. He has not had problems with driving.      Denies depression or anxiety. Daughter has bipolar. Son with ocd, eating problem and some depression     msk - bilateral knee replacements.     Eye - left eye has been poor since age 2 due to a lazy eye - amblyopia  Right eye post cataract surgery had an artificial lens put in and does not wear glasses till the evening     Hearing. Feels he has wax in his left ear and partial problem with the right ear. It is variable problem. Has used ear wax removal.      He reports a  "neuropathy      Medications                 Acetaminophen tylenol 500mg As needed            Amoxicillin amoxil  For dentist           Calcium carbonate tums 500mg As needed       Flecainide tambocor 50mg 1     1     Folic acid folvite 1mg 1           Metoprolol tartrate lopressor 25mg 1     1     MVI centrum variable           Pantoprazole protonix 20mg       1     Prevident 5000 dry mouth using           Psuedoeph-doxylamine DM APAP nyquil rare           Simvastatin zocor 10mg       1     Warfarin anticoagulant coumadin 5 days - 1 tab; 1/2 tab m/f                            Plan:    Reviewed dopamine scan results (datscan) with patient and family    He was encouraged to be physically active and use a treadmill.     Encouraged physical therapy    Parkinson 101 class - Jesusita Easley RN     Pharmacy Referral with Matheus Samayoa D    Neuropsychological evaluation with Dr. Mathur - luanne    Discussed symptomatic medication, ie sinemet carbidopa/levodopa    Return in 3 months    I have reviewed the note as documented above.  This accurately captures the substance of my conversation with the patient.  Patient contact time  40  minutes. Over 50% of this visit was spent in patient care and care coordination.     11am-1140am    Ángel Hill MD      ------------------------------------------------------------------------------------------------------------------------------------------------------------------------    Video-Visit Details    The patient has been notified of following:     \"After a review of the patient s situation, this visit was changed from an in-person visit to a video visit to reduce the risk of COVID-19 exposure.   The patient is being evaluated via a billable video visit.\"    \"This video visit will be conducted via a call between you and your physician/provider. We have found that certain health care needs can be provided without the need for an in-person physical exam.  This service lets us provide " "the care you need with a video conversation.  If a prescription is necessary we can send it directly to your pharmacy.  If lab work is needed we can place an order for that and you can then stop by our lab to have the test done at a later time.    If during the course of the call the physician/provider feels a video visit is not appropriate, you will not be charged for this service.\"     Patient has given verbal consent for Video visit? Yes    Patient would like the video invitation sent by:     Type of service:  Video Visit    Video Start Time:     Video End Time (time video stopped):     Duration:  minutes - see above    Originating Location (pt. Location):     Distant Location (provider location):  [facility] [place]     Mode of Communication:  Video Conference via [employer] (and if not possible then doximity)      Ángel Hill MD      --------------------------------------------------------------------------------------------------------------    Hareshroger Rock is a 70 year old male who is being evaluated via a billable video visit.      Charts reviewed  Consult from  Images reviewed        I have reviewed and updated the patient's Past Medical History, Social History, Family History and Medication List.    ALLERGIES  No known drug allergy    Lasts visit details if there was a last visit:         Medications                                                                                                                                                                                                              14 Review of systems  are negative except for   Patient Active Problem List   Diagnosis     Hemochromatosis     Antiphospholipid antibody syndrome (H)     Drafr-askvlp-iekv disease, chronic (H)     Advanced directives, counseling/discussion     Polyneuropathy     Status post total knee replacements     Status post bone marrow transplant (H)     Hyperlipidemia with target LDL " less than 100     Atrial fibrillation (H)     Personal history of DVT (deep vein thrombosis)     Chronic rhinitis     Gallstones without obstruction of gallbladder     Long-term (current) use of anticoagulants [Z79.01]     Thyroid nodule     Type 2 diabetes mellitus with stage 2 chronic kidney disease (H)     Renal hypertension     History of Hodgkin's lymphoma     History of irradiation, presenting hazards to health     Primary pulmonary hypertension (H)     Hereditary hemochromatosis (H)     Oropharyngeal dysphagia     Articulation disorder     Cardiac dysrhythmia     Disease of blood and blood forming organ     Personal history of PE (pulmonary embolism)     Complications of bone marrow transplant (H)     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)     Renal failure, chronic     Diabetes mellitus type 1 (H)     Hypotension     Essential hypertension     Hyperlipidemia     PAF (paroxysmal atrial fibrillation) (H)     Total knee replacement status     Parkinson disease (H)     Abnormal dopamine scan (DaTSCAN) 2020        Allergies   Allergen Reactions     No Known Drug Allergy      Past Surgical History:   Procedure Laterality Date     ANESTHESIA CARDIOVERSION  4/21/2011    Procedure:ANESTHESIA CARDIOVERSION; Surgeon:GENERIC ANESTHESIA PROVIDER; Location:[facility] OR     ARTHROSCOPY KNEE RT/LT      LT     CATARACT IOL, RT/LT  2017     COLONOSCOPY  10/16/2012    Procedure: COLONOSCOPY;  Colonoscopy, screening;  Surgeon: Reg Feliciano MD;  Location: [facility] OR     ESOPHAGOSCOPY, GASTROSCOPY, DUODENOSCOPY (EGD), COMBINED N/A 10/7/2020    Procedure: ESOPHAGOGASTRODUODENOSCOPY, WITH BIOPSY;  Surgeon: Raul Sawyer DO;  Location: [facility] OR     H ABLATION FOCAL AFIB  3/5/2013    PVI     H ABLATION FOCAL AFIB  01/01/2018     HC BONE MARROW/STEM TRANSPLANT ALLOGENIC  5/8/2007     INSERT PORT VASCULAR ACCESS       JOINT REPLACEMTN, KNEE RT/LT  3/28/12    SHAWN     VASECTOMY  1990     Past Medical History:   Diagnosis Date     Abnormal  dopamine scan (DaTSCAN) 2020 11/04/2020    [phone] 11/3/2020   Narrative & Impression   Examination: Nuclear medicine DATscan for Dopamine Receptor Localization.   Examination: NM Brain Image Tomographic (SPECT) DATscan   Date: 11/3/2020 3:21 PM   Indication: Parkinsonism   Comparison: None   Additional Information: none   Interfering Medications: None   Technique:   The patient initially received 1 ml of Lugol's solution orally prior     Antiphospholipid antibody syndrome (H) 2005    Ravi's     Articulation disorder 09/23/2020     Atrial fibrillation (H) 04/2011     Cirrhosis (H)      CKD (chronic kidney disease) stage 2, GFR 60-89 ml/min      Diabetes mellitus type II     steroid induced     DJD (degenerative joint disease) of knee     SHWAN, worse on right     DVT (deep venous thrombosis) (H)      ED (erectile dysfunction)      Gallbladder polyp 05/2010     Gwjxm-Xsqybj-Smbq Disease 2007    BMT     Hemochromatosis      Hodgkin's disease NOS     5 cycles of ABVD in 2011     HTN (hypertension)      Hyperlipidaemia LDL goal <100      Multiple thyroid nodules     benign      Myeloproliferative disorder (H)     atypical, [facility]     Parkinson disease (H) 09/24/2020     PE (pulmonary embolism) 11/2004     Polyneuropathy      Primary pulmonary hypertension (H)      Social History     Socioeconomic History     Marital status:      Spouse name: Angelica     Number of children: 2     Years of education: 21     Highest education level: Not on file   Occupational History     Occupation:      Employer: [employer]     Employer: DISABLED   Social Needs     Financial resource strain: Not on file     Food insecurity     Worry: Not on file     Inability: Not on file     Transportation needs     Medical: Not on file     Non-medical: Not on file   Tobacco Use     Smoking status: Never Smoker     Smokeless tobacco: Never Used   Substance and Sexual Activity     Alcohol use: No     Drug use: No     Sexual activity:  Not Currently     Partners: Female   Lifestyle     Physical activity     Days per week: Not on file     Minutes per session: Not on file     Stress: Not on file   Relationships     Social connections     Talks on phone: Not on file     Gets together: Not on file     Attends Confucianism service: Not on file     Active member of club or organization: Not on file     Attends meetings of clubs or organizations: Not on file     Relationship status: Not on file     Intimate partner violence     Fear of current or ex partner: Not on file     Emotionally abused: Not on file     Physically abused: Not on file     Forced sexual activity: Not on file   Other Topics Concern     Parent/sibling w/ CABG, MI or angioplasty before 65F 55M? No   Social History Narrative     about 50 yrs        Wife has some health issues - back pain - second knee replaced    She had gastric bypass and a fib and bad mitral valve        Son in [place]    Daughter in [facility] with 8 yo son.         Retired     Office work/        Born and raised in [place]              Family History   Problem Relation Age of Onset     Hypertension Mother      Cerebrovascular Disease Mother      Breast Cancer Mother      Arrhythmia Brother      Arrhythmia Sister         supraventricular tachycardia     Thyroid Disease Sister      Other - See Comments Son         lives in [place]. no kids and not .     Obsessive Compulsive Disorder Son      Depression Son      Eating Disorder Son         eats when depressed     Obesity Son      Thyroid Cancer Daughter         had thyroid removed     Bipolar Disorder Daughter      Other - See Comments Daughter         lives in [place] - single with one son 9  yrs     Other - See Comments Sister      Alzheimer Disease Father      Diabetes Paternal Aunt      Gastrointestinal Disease Maternal Grandmother         colitis      C.A.D. No family hx of      Current Outpatient Medications    Medication Sig Dispense Refill     acetaminophen (TYLENOL) 500 MG tablet 500mg tab as needed       amoxicillin (AMOXIL) 500 MG capsule 4 x 500mg tabs by mouth 1 hour before dental appointments.       calcium carbonate (TUMS) 500 MG chewable tablet 1 tab by mouth in morning or at night as needed       flecainide (TAMBOCOR) 50 MG tablet Take 1 tablet (50 mg) by mouth 2 times daily 180 tablet 3     folic acid (FOLVITE) 1 MG tablet Take 1 tablet (1,000 mcg) by mouth daily 90 tablet 3     metoprolol tartrate (LOPRESSOR) 25 MG tablet TAKE 1 TABLET BY MOUTH TWICE A  tablet 3     multivitamin (CENTRUM SILVER) tablet Take 1 tablet by mouth daily       NONFORMULARY Nasal spry as needed       pantoprazole (PROTONIX) 20 MG EC tablet TAKE 1 TABLET (20 MG) BY MOUTH AT BEDTIME 90 tablet 3     PREVIDENT 5000 DRY MOUTH 1.1 % GEL topical gel Take by mouth daily  3     Pseudoeph-Doxylamine-DM-APAP (NYQUIL PO) Take by mouth as needed       simvastatin (ZOCOR) 10 MG tablet Take 1 tablet (10 mg) by mouth daily 90 tablet 3     warfarin ANTICOAGULANT (COUMADIN) 2.5 MG tablet TAKE 1.25 MG (1/2 TABLET) MON/FRI AND 2.5 MG (1 TABLET) ALL OTHER DAYS. NEED APPT. 90 tablet 0

## 2020-11-09 ENCOUNTER — HOSPITAL ENCOUNTER (OUTPATIENT)
Dept: SPEECH THERAPY | Facility: CLINIC | Age: 70
Setting detail: THERAPIES SERIES
End: 2020-11-09
Attending: OTOLARYNGOLOGY
Payer: COMMERCIAL

## 2020-11-09 PROCEDURE — 92526 ORAL FUNCTION THERAPY: CPT | Mod: GN | Performed by: SPEECH-LANGUAGE PATHOLOGIST

## 2020-11-09 PROCEDURE — 92610 EVALUATE SWALLOWING FUNCTION: CPT | Mod: GN | Performed by: SPEECH-LANGUAGE PATHOLOGIST

## 2020-11-10 NOTE — PROGRESS NOTES
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 He was evaluated by Dr. Mitchell on 9/4/20, which revealed significant pooling of secretions in the piriforms, R>L. He has had a concerning amount of unintentional weight loss of 30 lbs since February 2020. He had a recent video swallow study which revealed silent aspiration on thin liquids and severe residue in the valleculae across trials.  It was recommended that pt initiate a dysphagia diet level 2 and thin liquids with the supraglottic swallow and f/u with SLP for ongoing treatment.  Concern for neurological involvement was noted and pt was referred to Dr. Hill where he was recently diagnosed with Parkinson's disease. At this time, pt reported to be eating regular textures which he chews thoroughly and eliciting cough/throat clearing behavior after intake.  Pt reported that he is not currently completing any exercises but is open to intervention.       Respiratory Status Room air   Prior Level Of Function Swallowing   Prior Level Of Function Comment Solid textures and thin liquids    Patient Role/employment History Retired  (Worked in [employer] )   Living Environment [place]  Observations Pleasant and cooperative, flat affect, masked facial expresion, slow movement    Patient/family Goals Goals not formally stated at the time of evaluation    Fall Risk Screen   Fall screen completed by SLP   Have you fallen 2 or more times in the past year? No   Have you fallen and had an injury in the past year? No   Clinical Swallow Evaluation   Oral Musculature generally intact   Structural Abnormalities none present   Dentition present and adequate   Secretion Management problems swallowing secretions  (Pt reports thick phlegm )   Mucosal Quality adequate   Mandibular Strength and Mobility intact   Oral Labial Strength and Mobility WFL;other (see comments)  (Slightly flat on the Lside )   Lingual Strength and Mobility WFL   Velar Elevation intact   Buccal Strength and Mobility intact   Laryngeal Function Throat clear;Cough;Swallow;Voicing initiated   Oral Musculature Comments WFL   Additional Documentation Yes   Clinical Swallow Eval: Thin Liquid Texture Trial   Mode of Presentation, Thin Liquids cup;self-fed   Volume of Liquid or Food Presented   (4 oz water )   Oral Phase of Swallow WFL   Pharyngeal Phase of Swallow coughing/choking;throat clearing;feeling of something stuck in throat   Diagnostic Statement Positive s/s of aspiration but pt reported to be eliciting coughing as he was instructed to do so and also because he felt as though he needed to.     Successful Strategies Trialed During Procedure other (see comments)  (Supraglottic swallow )   Clinical Swallow Eval: Pudding Thick Liquid Texture Trial   Mode of Presentation, Pudding spoon;self-fed   Volume of Pudding Presented 1 tbsp bites x2   Oral Phase, Pudding WFL   Pharyngeal Phase, Pudding intact   Diagnostic Statement No overt s/s of aspiration.    Clinical Swallow Eval: Solid Food Texture Trial   Mode of Presentation, Solid self-fed   Volume of Solid Food Presented 1 malena cracker    Oral Phase, Solid WFL  (Increased time )    Pharyngeal Phase, Solid coughing/choking;feeling of something stuck in throat;repeated swallows   Successful Strategies Trialed During Procedure, Solid hard swallow  (with liquid wash )   Diagnostic Statement Throat clearing elicited x1 and cough elicited x1.  Pt independently using a liquid wash.     Swallow Compensations   Swallow Compensations Reduce amounts;Multiple swallow;Supraglottic swallow;Effortful swallow;Alternate viscosity of consistencies   Educational Assessment   Barriers to Learning No barriers  (Question cognition )   Esophageal Phase of Swallow   Patient reports or presents with symptoms of esophageal dysphagia No   General Therapy Interventions   Planned Therapy Interventions Dysphagia Treatment   Dysphagia treatment Modified diet education;Instruction of safe swallow strategies;Compensatory strategies for swallowing;Oropharyngeal exercise training  (LSVT exercises for speech and swallowing )   Swallow Eval: Clinical Impressions   Skilled Criteria for Therapy Intervention Skilled criteria met.  Treatment indicated.   Functional Assessment Scale (FAS) 2   Treatment Diagnosis Moderate to severe oropharyngeal dysphagia    Diet texture recommendations Dysphagia diet level 2;Thin liquids   Recommended Feeding/Eating Techniques alternate between small bites and sips of food/liquid;maintain upright posture during/after eating for 30 mins;small sips/bites;other (see comments)  (supraglottic swallow )   Rehab Potential good, to achieve stated therapy goals   Demonstrates Need for Referral to Another Service physical therapy   Therapy Frequency   (2x/week )   Predicted Duration of Therapy Intervention (days/wks) up to 12 weeks    Anticipated Discharge Disposition home   Risks and Benefits of Treatment have been explained. Yes   Patient, family and/or staff in agreement with Plan of Care Yes   Clinical Impression Comments Haresh Rock presents with moderate severe dysphagia characterized by weakness  related to his diagnosis of PD. Oral mechanism examination functional but slowed with cough present but mildly reduced.   Trials of thin liquids and solid textures resulted in positive s/s of aspiration.  Pt reported to be coughing with liquids both because he felt as though needed to and because he was trained to do so partially recalling the supraglottic swallow.  At this time, pt is at high risk for aspiration and should continue a DD2 with thin liquids with use of supraglottic swallow strategy.  Pt will need to complete oral cares prior to intake.  Pt will need to be upright for intake and should take small bites/sips at a slow pace while alternating consistencies.  Pt should remain upright after PO intake.  Pt would benefit from ongoing SLP services to ensure diet tolerance, train safe/compensatory swallow strategies, and establish exercise programming in the setting of his diagnosis of PD.     Swallow Goals   SLP Swallow Goals 1;2   Swallow Goal 1   Goal Identifier 1   Goal Description 1. Pt will tolerate DD2 with thin liquids with independent use of supraglottic swallow (swallow-cough-swallow) in 9/10 PO trials and no adverse medical events over a 3 month period.    Target Date 02/07/21   Swallow Goal 2   Goal Identifier 2   Goal Description 2. Pt will complete established exercise programming for management of dysphagia as recommended by the treating SLP.    Target Date 02/07/21   Total Session Time   SLP Eval: oral/pharyngeal swallow function, clinical minutes (44732) 30   Total Evaluation Time 30   Therapy Certification   Certification date from 11/09/20   Certification date to 02/07/21   Medical Diagnosis Dysphagia    Certification I certify the need for these services furnished under this plan of treatment and while under my care.  (Physician co-signature of this document indicates review and certification of the therapy plan).     Thank you for the referral of Haresh Rock.  If you have any questions  about this report, please contact me using the information below.       Linda Anthony M.S. CCC-SLP  Speech Language Pathologist   [facility]   Department of Otoolaryngology, [facility]- 4th Floor / [address]  Phone: [phone]  Pager: [phone]  Email: [email or web address]

## 2020-11-12 ENCOUNTER — VIRTUAL VISIT (OUTPATIENT)
Dept: NEUROLOGY | Facility: CLINIC | Age: 70
End: 2020-11-12
Payer: COMMERCIAL

## 2020-11-12 DIAGNOSIS — G20.A1 PARKINSON DISEASE (H): Primary | ICD-10-CM

## 2020-11-12 DIAGNOSIS — R41.89 COGNITIVE IMPAIRMENT: ICD-10-CM

## 2020-11-12 PROCEDURE — 99215 OFFICE O/P EST HI 40 MIN: CPT | Mod: 95 | Performed by: PSYCHIATRY & NEUROLOGY

## 2020-11-12 NOTE — PROGRESS NOTES
"Haresh Rock is a 70 year old male who is being evaluated via a billable video visit.      The patient has been notified of following:     \"This video visit will be conducted via a call between you and your physician/provider. We have found that certain health care needs can be provided without the need for an in-person physical exam.  This service lets us provide the care you need with a video conversation.  If a prescription is necessary we can send it directly to your pharmacy.  If lab work is needed we can place an order for that and you can then stop by our lab to have the test done at a later time.    Video visits are billed at different rates depending on your insurance coverage.  Please reach out to your insurance provider with any questions.    If during the course of the call the physician/provider feels a video visit is not appropriate, you will not be charged for this service.\"    Patient has given verbal consent for Video visit? Yes  How would you like to obtain your AVS? MyChart  If you are dropped from the video visit, the video invite should be resent to: Other e-mail: [employer]  Will anyone else be joining your video visit? No        Video-Visit Details    Type of service:  Video Visit      Distant Location (provider location):  [facility] [place]     Platform used for Video Visit: [employer]      "

## 2020-11-12 NOTE — LETTER
11/12/2020       RE: Haresh Rock  [address] [place] [address]     Dear Colleague,    Thank you for referring your patient, Haresh Rock, to the [facility] NEUROLOGY CLINIC [place] at [facility]. Please see a copy of my visit note below.      VIDEO VISIT    Date of Visit: November 12, 2020  Name: Haresh Rock  Date of Birth 1950  [address] [id number] [phone] (M)  [phone] (H)  [email or web address]  Has mychart  proxy - sheyla Ramirez - son  Gemma - daughter  Sheyla Rock  [phone]     [email or web address], [email or web address] and [email or web address]  Mine is [email or web address]    Assessment:  (G20) Parkinson disease (H)  (primary encounter diagnosis)  (F80.0) Articulation disorder. Hypophonia for the past 3-4 months. He has lost 20 lbs or so. There are no diet or eating changes.  No tremor. Right handed. Denies lack of arm swing. Has not had falls. He has had some slowness over and naps every day and is up after midnight and may get up at 7am. He has features of parkinsonism.    Dopamine scan - (datscan)  11/3/2020  A presynaptic dopaminergic deficit is demonstrated bilaterally.     ? Endoscopy to evaluate for weight loss.   Swallow study 9/22/2020  1. Multiple events of deep laryngeal penetration with thinner barium  consistencies with intermittent aspiration.   2. Please see the speech pathologist report for further details.  Linda Downey, SLP  Xray swallow 12/10/2020 scheduled with discussion with Linda 12/11/2020    He states he never was a drinker but has had liver problems which reportedly may have been due to iron accumulation in the liver.      Denies constipation   heartburn tums as needed    Had been treated for h pylori for two weeks with antibiotics.   He has lost another 10 lbs    He is ensure rarely - once per week.     Gallstones but has not gallbladder removed.  Had a gallbladder attack x 1 and is not on  actigall      loud therapy  With Linda  Has not done physical therapy    Has atrial fibrillation and has had 2 ablation attempts and has had benefit from flecainide for the past 2 years.   Heart is doing well and monitoring with apple watch  Metoprolol lopressor 25mg 2/day  Flecainide tambocor 50mg 2/day      Past history of hypotension     Anticoagulated  warfarin     Hereditary hemochromatosis gene - gene that increases his risk and has not had problems with this. Had had phlebotomies x 2 in the past.     History of pulmonary embolii - back in 2004. This was related to blood clotting problems.   May have had a DVT. Reportedly has had antiphospholipid antibody. He is on coumadin.      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 Had a transplant from Dr. 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  No problems since then.      Hodgkin's lymphoma in the past 2011 and had chemotherapy for this and followed with CT scan and reportedly this is gone.      Pulmonary hypertension in the past.     Endo - cholesterol on simvastatin 10mg/day  Had diabetes in the past from prednisone  And this may have resolved  May have a thyroid nodule.  TSH was normal.      Renal function.   May have stage 2 disease.      Prostate - denies problems. He has has nocturia couple times per night.   Denies elevated psa     Smell perception has been affected since chemotherapy in 2011 and his taste is affected as well.      Denies allergies or infections.      He denies talking in his sleep or snoring. He does not sleep in the same bed as his wife who sleeps elsewhere and she is using cpap.     He states he does not have significant memory problems. He  May forget things at times. He has not had problems with driving.      Denies depression or anxiety. Daughter has bipolar. Son with ocd, eating problem and some depression     msk - bilateral knee replacements.     Eye - left eye has been poor  "since age 2 due to a lazy eye - amblyopia  Right eye post cataract surgery had an artificial lens put in and does not wear glasses till the evening     Hearing. Feels he has wax in his left ear and partial problem with the right ear. It is variable problem. Has used ear wax removal.      He reports a neuropathy      Medications                 Acetaminophen tylenol 500mg As needed            Amoxicillin amoxil  For dentist           Calcium carbonate tums 500mg As needed       Flecainide tambocor 50mg 1     1     Folic acid folvite 1mg 1           Metoprolol tartrate lopressor 25mg 1     1     MVI centrum variable           Pantoprazole protonix 20mg       1     Prevident 5000 dry mouth using           Psuedoeph-doxylamine DM APAP nyquil rare           Simvastatin zocor 10mg       1     Warfarin anticoagulant coumadin 5 days - 1 tab; 1/2 tab m/f                            Plan:    Reviewed dopamine scan results (datscan) with patient and family    He was encouraged to be physically active and use a treadmill.     Encouraged physical therapy    Parkinson 101 class - Jesusita Easley RN     Pharmacy Referral with Leslie Arredondo, Pharm D    Neuropsychological evaluation with Dr. Mathur - baseline    Discussed symptomatic medication, ie sinemet carbidopa/levodopa    Return in 3 months    I have reviewed the note as documented above.  This accurately captures the substance of my conversation with the patient.  Patient contact time  40  minutes. Over 50% of this visit was spent in patient care and care coordination.     11am-1140am    Ángel Hill MD      ------------------------------------------------------------------------------------------------------------------------------------------------------------------------    Video-Visit Details    The patient has been notified of following:     \"After a review of the patient s situation, this visit was changed from an in-person visit to a video visit to reduce the risk of COVID-19 " "exposure.   The patient is being evaluated via a billable video visit.\"    \"This video visit will be conducted via a call between you and your physician/provider. We have found that certain health care needs can be provided without the need for an in-person physical exam.  This service lets us provide the care you need with a video conversation.  If a prescription is necessary we can send it directly to your pharmacy.  If lab work is needed we can place an order for that and you can then stop by our lab to have the test done at a later time.    If during the course of the call the physician/provider feels a video visit is not appropriate, you will not be charged for this service.\"     Patient has given verbal consent for Video visit? Yes    Patient would like the video invitation sent by:     Type of service:  Video Visit    Video Start Time:     Video End Time (time video stopped):     Duration:  minutes - see above    Originating Location (pt. Location):     Distant Location (provider location):  [facility] NEUROLOGY [facility]     Mode of Communication:  Video Conference via [employer] [facility] (and if not possible then doximity)      Ángel Hill MD      --------------------------------------------------------------------------------------------------------------    Haresh Rock is a 70 year old male who is being evaluated via a billable video visit.      Charts reviewed  Consult from  Images reviewed        I have reviewed and updated the patient's Past Medical History, Social History, Family History and Medication List.    ALLERGIES  No known drug allergy    Lasts visit details if there was a last visit:         Medications                                                                                                                                                                                                              14 Review of systems  are negative except for   Patient Active Problem List "   Diagnosis     Hemochromatosis     Antiphospholipid antibody syndrome (H)     Jxxjx-chnhcb-keim disease, chronic (H)     Advanced directives, counseling/discussion     Polyneuropathy     Status post total knee replacements     Status post bone marrow transplant (H)     Hyperlipidemia with target LDL less than 100     Atrial fibrillation (H)     Personal history of DVT (deep vein thrombosis)     Chronic rhinitis     Gallstones without obstruction of gallbladder     Long-term (current) use of anticoagulants [Z79.01]     Thyroid nodule     Type 2 diabetes mellitus with stage 2 chronic kidney disease (H)     Renal hypertension     History of Hodgkin's lymphoma     History of irradiation, presenting hazards to health     Primary pulmonary hypertension (H)     Hereditary hemochromatosis (H)     Oropharyngeal dysphagia     Articulation disorder     Cardiac dysrhythmia     Disease of blood and blood forming organ     Personal history of PE (pulmonary embolism)     Complications of bone marrow transplant (H)     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)     Renal failure, chronic     Diabetes mellitus type 1 (H)     Hypotension     Essential hypertension     Hyperlipidemia     PAF (paroxysmal atrial fibrillation) (H)     Total knee replacement status     Parkinson disease (H)     Abnormal dopamine scan (DaTSCAN) 2020        Allergies   Allergen Reactions     No Known Drug Allergy      Past Surgical History:   Procedure Laterality Date     ANESTHESIA CARDIOVERSION  4/21/2011    Procedure:ANESTHESIA CARDIOVERSION; Surgeon:GENERIC ANESTHESIA PROVIDER; Location:[facility] OR     ARTHROSCOPY KNEE RT/LT      LT     CATARACT IOL, RT/LT  2017     COLONOSCOPY  10/16/2012    Procedure: COLONOSCOPY;  Colonoscopy, screening;  Surgeon: Reg Feliciano MD;  Location:  OR     ESOPHAGOSCOPY, GASTROSCOPY, DUODENOSCOPY (EGD), COMBINED N/A 10/7/2020    Procedure: ESOPHAGOGASTRODUODENOSCOPY, WITH BIOPSY;  Surgeon: Raul Sawyer DO;  Location:  [facility] OR     H ABLATION FOCAL AFIB  3/5/2013    PVI     H ABLATION FOCAL AFIB  01/01/2018     HC BONE MARROW/STEM TRANSPLANT ALLOGENIC  5/8/2007     INSERT PORT VASCULAR ACCESS       JOINT REPLACEMTN, KNEE RT/LT  3/28/12    SHAWN     VASECTOMY  1990     Past Medical History:   Diagnosis Date     Abnormal dopamine scan (DaTSCAN) 2020 11/04/2020    [phone] 11/3/2020   Narrative & Impression   Examination: Nuclear medicine DATscan for Dopamine Receptor Localization.   Examination: NM Brain Image Tomographic (SPECT) DATscan   Date: 11/3/2020 3:21 PM   Indication: Parkinsonism   Comparison: None   Additional Information: none   Interfering Medications: None   Technique:   The patient initially received 1 ml of Lugol's solution orally prior     Antiphospholipid antibody syndrome (H) 2005    Ravi's     Articulation disorder 09/23/2020     Atrial fibrillation (H) 04/2011     Cirrhosis (H)      CKD (chronic kidney disease) stage 2, GFR 60-89 ml/min      Diabetes mellitus type II     steroid induced     DJD (degenerative joint disease) of knee     SHAWN, worse on right     DVT (deep venous thrombosis) (H)      ED (erectile dysfunction)      Gallbladder polyp 05/2010     Slsxz-Thsriy-Xndc Disease 2007    BMT     Hemochromatosis      Hodgkin's disease NOS     5 cycles of ABVD in 2011     HTN (hypertension)      Hyperlipidaemia LDL goal <100      Multiple thyroid nodules     benign      Myeloproliferative disorder (H)     atypical, [facility]     Parkinson disease (H) 09/24/2020     PE (pulmonary embolism) 11/2004     Polyneuropathy      Primary pulmonary hypertension (H)      Social History     Socioeconomic History     Marital status:      Spouse name: Angelica     Number of children: 2     Years of education: 21     Highest education level: Not on file   Occupational History     Occupation:      Employer: [employer]     Employer: DISABLED   Social Needs     Financial resource strain: Not on file     Food  insecurity     Worry: Not on file     Inability: Not on file     Transportation needs     Medical: Not on file     Non-medical: Not on file   Tobacco Use     Smoking status: Never Smoker     Smokeless tobacco: Never Used   Substance and Sexual Activity     Alcohol use: No     Drug use: No     Sexual activity: Not Currently     Partners: Female   Lifestyle     Physical activity     Days per week: Not on file     Minutes per session: Not on file     Stress: Not on file   Relationships     Social connections     Talks on phone: Not on file     Gets together: Not on file     Attends [employer] service: Not on file     Active member of club or organization: Not on file     Attends meetings of clubs or organizations: Not on file     Relationship status: Not on file     Intimate partner violence     Fear of current or ex partner: Not on file     Emotionally abused: Not on file     Physically abused: Not on file     Forced sexual activity: Not on file   Other Topics Concern     Parent/sibling w/ CABG, MI or angioplasty before 65F 55M? No   Social History Narrative     about 50 yrs        Wife has some health issues - back pain - second knee replaced    She had gastric bypass and a fib and bad mitral valve        Son in [place]    Daughter in [facility] with 8 yo son.         Retired     Office work/        Born and raised in [facility] [place]              Family History   Problem Relation Age of Onset     Hypertension Mother      Cerebrovascular Disease Mother      Breast Cancer Mother      Arrhythmia Brother      Arrhythmia Sister         supraventricular tachycardia     Thyroid Disease Sister      Other - See Comments Son         lives in [place]. no kids and not .     Obsessive Compulsive Disorder Son      Depression Son      Eating Disorder Son         eats when depressed     Obesity Son      Thyroid Cancer Daughter         had thyroid removed     Bipolar Disorder Daughter      Other -  "See Comments Daughter         lives in [place] - single with one son 9  yrs     Other - See Comments Sister      Alzheimer Disease Father      Diabetes Paternal Aunt      Gastrointestinal Disease Maternal Grandmother         colitis      C.A.D. No family hx of      Current Outpatient Medications   Medication Sig Dispense Refill     acetaminophen (TYLENOL) 500 MG tablet 500mg tab as needed       amoxicillin (AMOXIL) 500 MG capsule 4 x 500mg tabs by mouth 1 hour before dental appointments.       calcium carbonate (TUMS) 500 MG chewable tablet 1 tab by mouth in morning or at night as needed       flecainide (TAMBOCOR) 50 MG tablet Take 1 tablet (50 mg) by mouth 2 times daily 180 tablet 3     folic acid (FOLVITE) 1 MG tablet Take 1 tablet (1,000 mcg) by mouth daily 90 tablet 3     metoprolol tartrate (LOPRESSOR) 25 MG tablet TAKE 1 TABLET BY MOUTH TWICE A  tablet 3     multivitamin (CENTRUM SILVER) tablet Take 1 tablet by mouth daily       NONFORMULARY Nasal spry as needed       pantoprazole (PROTONIX) 20 MG EC tablet TAKE 1 TABLET (20 MG) BY MOUTH AT BEDTIME 90 tablet 3     PREVIDENT 5000 DRY MOUTH 1.1 % GEL topical gel Take by mouth daily  3     Pseudoeph-Doxylamine-DM-APAP (NYQUIL PO) Take by mouth as needed       simvastatin (ZOCOR) 10 MG tablet Take 1 tablet (10 mg) by mouth daily 90 tablet 3     warfarin ANTICOAGULANT (COUMADIN) 2.5 MG tablet TAKE 1.25 MG (1/2 TABLET) MON/FRI AND 2.5 MG (1 TABLET) ALL OTHER DAYS. NEED APPT. 90 tablet 0                       Haresh Rock is a 70 year old male who is being evaluated via a billable video visit.      The patient has been notified of following:     \"This video visit will be conducted via a call between you and your physician/provider. We have found that certain health care needs can be provided without the need for an in-person physical exam.  This service lets us provide the care you need with a video conversation.  If a prescription is necessary we can " "send it directly to your pharmacy.  If lab work is needed we can place an order for that and you can then stop by our lab to have the test done at a later time.    Video visits are billed at different rates depending on your insurance coverage.  Please reach out to your insurance provider with any questions.    If during the course of the call the physician/provider feels a video visit is not appropriate, you will not be charged for this service.\"    Patient has given verbal consent for Video visit? Yes  How would you like to obtain your AVS? MyChart  If you are dropped from the video visit, the video invite should be resent to: Other e-mail: [employer]  Will anyone else be joining your video visit? No        Video-Visit Details    Type of service:  Video Visit      Distant Location (provider location):  [facility] NEUROLOGY CLINIC [place]     Platform used for Video Visit: Good Ferrell          Again, thank you for allowing me to participate in the care of your patient.      Sincerely,    Ángel Hill MD      "

## 2020-11-12 NOTE — PATIENT INSTRUCTIONS
(G20) Parkinson disease (H)  (primary encounter diagnosis)  (F80.0) Articulation disorder. Hypophonia for the past 3-4 months. He has lost 20 lbs or so. There are no diet or eating changes.  No tremor. Right handed. Denies lack of arm swing. Has not had falls. He has had some slowness over and naps every day and is up after midnight and may get up at 7am. He has features of parkinsonism.    Dopamine scan - (datscan)  11/3/2020  A presynaptic dopaminergic deficit is demonstrated bilaterally.     ? Endoscopy to evaluate for weight loss.   Swallow study 9/22/2020  1. Multiple events of deep laryngeal penetration with thinner barium  consistencies with intermittent aspiration.   2. Please see the speech pathologist report for further details.  Linda Downey, SLP  Xray swallow 12/10/2020 scheduled with discussion with Linda 12/11/2020    He states he never was a drinker but has had liver problems which reportedly may have been due to iron accumulation in the liver.      Denies constipation   heartburn tums as needed    Had been treated for h pylori for two weeks with antibiotics.   He has lost another 10 lbs    He is ensure rarely - once per week.     Gallstones but has not gallbladder removed.  Had a gallbladder attack x 1 and is not on actigall      loud therapy  With Linda  Has not done physical therapy    Has atrial fibrillation and has had 2 ablation attempts and has had benefit from flecainide for the past 2 years.   Heart is doing well and monitoring with apple watch  Metoprolol lopressor 25mg 2/day  Flecainide tambocor 50mg 2/day      Past history of hypotension     Anticoagulated  warfarin     Hereditary hemochromatosis gene - gene that increases his risk and has not had problems with this. Had had phlebotomies x 2 in the past.     History of pulmonary embolii - back in 2004. This was related to blood clotting problems.   May have had a DVT. Reportedly has had antiphospholipid antibody. He is on  coumadin.      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 Had a transplant from Dr. 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  No problems since then.      Hodgkin's lymphoma in the past 2011 and had chemotherapy for this and followed with CT scan and reportedly this is gone.      Pulmonary hypertension in the past.     Endo - cholesterol on simvastatin 10mg/day  Had diabetes in the past from prednisone  And this may have resolved  May have a thyroid nodule.  TSH was normal.      Renal function.   May have stage 2 disease.      Prostate - denies problems. He has has nocturia couple times per night.   Denies elevated psa     Smell perception has been affected since chemotherapy in 2011 and his taste is affected as well.      Denies allergies or infections.      He denies talking in his sleep or snoring. He does not sleep in the same bed as his wife who sleeps elsewhere and she is using cpap.     He states he does not have significant memory problems. He  May forget things at times. He has not had problems with driving.      Denies depression or anxiety. Daughter has bipolar. Son with ocd, eating problem and some depression     msk - bilateral knee replacements.     Eye - left eye has been poor since age 2 due to a lazy eye - amblyopia  Right eye post cataract surgery had an artificial lens put in and does not wear glasses till the evening     Hearing. Feels he has wax in his left ear and partial problem with the right ear. It is variable problem. Has used ear wax removal.      He reports a neuropathy      Medications                 Acetaminophen tylenol 500mg As needed            Amoxicillin amoxil  For dentist           Calcium carbonate tums 500mg As needed       Flecainide tambocor 50mg 1     1     Folic acid folvite 1mg 1           Metoprolol tartrate lopressor 25mg 1     1     MVI centrum variable           Pantoprazole protonix 20mg       1     Prevident 5000  dry mouth using           Psuedoeph-doxylamine DM APAP nyquil rare           Simvastatin zocor 10mg       1     Warfarin anticoagulant coumadin 5 days - 1 tab; 1/2 tab m/f                            Plan:    Reviewed dopamine scan results (datscan) with patient and family    He was encouraged to be physically active and use a treadmill.     Encouraged physical therapy    Parkinson 101 class - Jesusita Easley RN     Pharmacy Referral with Leslie Arredondo Pharm D    Neuropsychological evaluation with Dr. Mathur - baseline    Discussed symptomatic medication, ie sinemet carbidopa/levodopa    Return in 3 months

## 2020-11-13 ENCOUNTER — DOCUMENTATION ONLY (OUTPATIENT)
Dept: FAMILY MEDICINE | Facility: CLINIC | Age: 70
End: 2020-11-13

## 2020-11-13 ENCOUNTER — TRANSFERRED RECORDS (OUTPATIENT)
Dept: HEALTH INFORMATION MANAGEMENT | Facility: CLINIC | Age: 70
End: 2020-11-13

## 2020-11-13 DIAGNOSIS — Z79.01 LONG TERM CURRENT USE OF ANTICOAGULANT THERAPY: ICD-10-CM

## 2020-11-13 DIAGNOSIS — D68.61 ANTIPHOSPHOLIPID ANTIBODY SYNDROME (H): ICD-10-CM

## 2020-11-13 DIAGNOSIS — I48.0 PAROXYSMAL ATRIAL FIBRILLATION (H): ICD-10-CM

## 2020-11-13 DIAGNOSIS — Z86.718 PERSONAL HISTORY OF DVT (DEEP VEIN THROMBOSIS): ICD-10-CM

## 2020-11-13 LAB — INR PPP: 2.2 (ref 0.9–1.1)

## 2020-11-13 NOTE — PROGRESS NOTES
ANTICOAGULATION  MANAGEMENT-Patient Home Monitoring Result    Assessment     Therapeutic INR result of 2.2 . Goal range 2.0-3.0. Received via fax from [employer] INR monitoring company.        Previous INR was therapeutic    Haresh was last contacted by phone: 20    Plan     Per home monitoring agreement with patient, patient was NOT contacted regarding therapeutic result today.  Patient is to continue current dose and continue to check INR with home monitor per protocol.  ?       OBJECTIVE    INR   Date Value Ref Range Status   2020 2.2 (A) 0.90 - 1.10 Final     Factor 2 Assay   Date Value Ref Range Status   2007 58 (L) 60 - 140 % Final     Comment:     (Note)  CALLED TO [facility](INTERV. RADIOLOGY)[id number],        ASSESSMENT / PLAN  No question data found.  Anticoagulation Summary  As of 2020    INR goal:  2.0-3.0   TTR:  85.2 % (1 y)   INR used for dosin.2 (2020)   Warfarin maintenance plan:  1.25 mg (2.5 mg x 0.5) every Mon; 2.5 mg (2.5 mg x 1) all other days   Full warfarin instructions:  1.25 mg every Mon; 2.5 mg all other days   Weekly warfarin total:  16.25 mg   Plan last modified:  Blanca Rodriguez, RN (10/27/2020)   Next INR check:  2020   Priority:  Maintenance   Target end date:  Indefinite    Indications    Long-term (current) use of anticoagulants [Z79.01] [Z79.01]  Atrial fibrillation (H) [I48.91]  Antiphospholipid antibody syndrome (H) [D68.61]  Personal history of DVT (deep vein thrombosis) [Z86.718]  Atrial fibrillation  unspecified type (H) (Resolved) [I48.91]             Anticoagulation Episode Summary     INR check location:      Preferred lab:  EXTERNAL LAB    Send INR reminders to:  CHELSEA CHILEL    Comments:  PEPP[facility] rodas to manage by exception      Anticoagulation Care Providers     Provider Role Specialty Phone number    Anya Nowak MD Referring Family Medicine [phone]

## 2020-11-17 ENCOUNTER — HOSPITAL ENCOUNTER (OUTPATIENT)
Dept: SPEECH THERAPY | Facility: CLINIC | Age: 70
Setting detail: THERAPIES SERIES
End: 2020-11-17
Attending: OTOLARYNGOLOGY
Payer: COMMERCIAL

## 2020-11-17 ENCOUNTER — VIRTUAL VISIT (OUTPATIENT)
Dept: PHARMACY | Facility: CLINIC | Age: 70
End: 2020-11-17
Payer: COMMERCIAL

## 2020-11-17 DIAGNOSIS — K21.00 GASTROESOPHAGEAL REFLUX DISEASE WITH ESOPHAGITIS WITHOUT HEMORRHAGE: ICD-10-CM

## 2020-11-17 DIAGNOSIS — I48.0 PAROXYSMAL ATRIAL FIBRILLATION (H): ICD-10-CM

## 2020-11-17 DIAGNOSIS — Z78.9 TAKES DIETARY SUPPLEMENTS: ICD-10-CM

## 2020-11-17 DIAGNOSIS — I10 ESSENTIAL HYPERTENSION: ICD-10-CM

## 2020-11-17 DIAGNOSIS — G20.A1 PARKINSON DISEASE (H): Primary | ICD-10-CM

## 2020-11-17 DIAGNOSIS — E78.5 HYPERLIPIDEMIA WITH TARGET LDL LESS THAN 100: ICD-10-CM

## 2020-11-17 DIAGNOSIS — J31.0 CHRONIC RHINITIS: ICD-10-CM

## 2020-11-17 PROCEDURE — 99605 MTMS BY PHARM NP 15 MIN: CPT | Mod: TEL | Performed by: PHARMACIST

## 2020-11-17 PROCEDURE — 92526 ORAL FUNCTION THERAPY: CPT | Mod: GN,95 | Performed by: SPEECH-LANGUAGE PATHOLOGIST

## 2020-11-17 PROCEDURE — 99607 MTMS BY PHARM ADDL 15 MIN: CPT | Mod: TEL | Performed by: PHARMACIST

## 2020-11-17 RX ORDER — LORATADINE 10 MG/1
10 TABLET ORAL DAILY PRN
COMMUNITY
End: 2021-11-12

## 2020-11-17 RX ORDER — FLUTICASONE PROPIONATE 50 MCG
1 SPRAY, SUSPENSION (ML) NASAL DAILY PRN
COMMUNITY
End: 2021-06-08

## 2020-11-17 NOTE — PROGRESS NOTES
MTM ENCOUNTER  SUBJECTIVE/OBJECTIVE:                           Haresh Rock is a 70 year old male called for an initial visit. He was referred to me from Dr. Hill.      Reason for visit: initial medication review    Allergies/ADRs: None  Tobacco: He reports that he has never smoked. He has never used smokeless tobacco.  Alcohol: not currently using  Caffeine: 1 bottle of Code Red Mountain Dew every day  Activity: not discussed today  Past Medical History: Reviewed in chart    Medication Adherence/Access:   Patient uses pill box(es) and alarms.  Patient takes medications 2 time(s) per day.   Per patient, misses medication 0 times per week.   Medication barriers: none.   The patient fills medications at [place]: NO, fills medications at [facility].    Parkinson's Disease:  Current medications include: none. Patient is not too bothered by Parkinson's disease symptoms at this time and would like to hold off on trying a Parkinson's disease medication at this time. He would like to revisit this in 3 months. Of note, he was recently treated for H. pylori infection.    Afib/Hypertension: Patient is currently taking warfarin 1.25 mg Mon/Fri and 2.5 mg the remaining days for anticoagulation (for the last 16 years). Patient reports no current concerns of bruising or bleeding. Patient does not have a history of GI bleed.   Patient is also taking flecainide 50 mg twice daily and metoprolol 25 mg twice daily. Patient reports the following medication side effects: orthostatic hypotension at times so he is careful when standing up quickly.  INR   Date Value Ref Range Status   11/13/2020 2.2 (A) 0.90 - 1.10 Final        [id number] Score    Date Calculated: 4/23/2019  9:09 AM  [id number] Score: 5         BP Readings from Last 3 Encounters:   10/07/20 106/62   07/13/20 123/76   02/18/20 136/78       Hyperlipidemia: Current therapy includes simvastatin 10 mg daily.  Patient reports no significant myalgias or other side  effects.    Recent Labs   Lab Test 08/20/20  1033 04/23/19  0846 11/16/15  1155 11/16/15  1155 07/09/14  1511   CHOL 121 128   < > 126 168   HDL 31* 31*   < > 31* 29*   LDL 64 68   < > 71 104   TRIG 131 143   < > 119 174*   CHOLHDLRATIO  --   --   --  4.1 5.8*    < > = values in this interval not displayed.       GERD: Current medications include: Protonix (pantoprazole) 20 mg once daily and Tums as needed. Has been on pantoprazole for 13 years - initially started by Dr. 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     Allergic Rhinitis: Current medications include fluticasone nasal spray as needed and Nyquil or Dayquil liquid as needed. Patient states that he is almost out of fluticasone nasal spray and he is not sure who prescribed this medication. He endorses a runny nose and some issues with post-nasal drip that causes him to cough at night. The Nyquil/dayquil seem to help.     Vitamins/supplements: Currently taking a daily MVI and folic acid (which was started after his bone marrow transplant in 2007). He is wondering if he still needs to take the folic acid.     Today's Vitals: There were no vitals taken for this visit.    Last vitals:   BP Readings from Last 1 Encounters:   10/07/20 106/62     Pulse Readings from Last 1 Encounters:   10/07/20 56       ASSESSMENT:                            Medication Adherence: No issues identified    Parkinson's Disease:  Stable. Will hold off on symptomatic treatment of Parkinson's disease. Would recommend carbidopa-levodopa as initial therapy when he feels ready to start it.     Afib/Hypertension: stable.    Hyperlipidemia: stable.    GERD: patient may benefit from a taper of the PPI and Dr. Miller agreed that this would be okay to try.    Allergic Rhinitis: discussed that a non-drowsy antihistamine might be most helpful for his post-nasal drip and rhinitis. He could continue to use the fluticasone nasally if  needed, but I would avoid Nyquil given the anticholinergic effects and his older age (risk for cognitive impairment and imbalance/falls).     Vitamins/supplements: discussed the folic acid with Dr. Miller and he agreed that we can stop the folic acid at this time.     PLAN:                            1. I spoke with Dr. Miller and he agrees that he can try tapering off pantoprazole, and you can stop folic acid.   2. To taper off pantoprazole, I would recommend taking 1 pill every other day for 2 weeks then stop taking it. If you start experiencing acid reflux or heartburn, you can certainly take Tums or we can go back on the pantoprazole if needed.   3. I would recommend that you stop Nyquil/Dayquil and instead try a non-drowsy antihistamine like Claritin, Allegra, or Zyrtec (over the counter) - 1 tablet every day. These antihistamines should help with your post-nasal drip symptoms.   4. I would recommend that you  another fluticasone nasal spray (over the counter) or request refill from Dr. Nowak. Additionally, being on an antihistamine (per #3 above) may help your runny nose.     I spent 28 minutes with this patient today. All changes were made via collaborative practice agreement with Dr. Hill. A copy of the visit note was provided to the patient's primary care and referring provider.    Will follow up in 1 year or sooner if needed.    The patient was sent via [employer] a summary of these recommendations.     Leslie Arredondo, Pharm.D.  Medication Therapy Management Pharmacist  Phone: [phone]    Patient consented to a telehealth visit: yes  Telemedicine Visit Details  Type of service:  Telephone visit  Start Time: 11:00 AM  End Time: 11:28 AM  Originating Location (patient location): Home  Distant Location (provider location):  [facility]  Mode of Communication:  Telephone

## 2020-11-17 NOTE — PROGRESS NOTES
Haresh Rock is a 70 year old male who is being seen via a billable video visit.      Patient has given verbal consent for Video visit? Yes    Video Start Time: 8:04 am     Telehealth Visit Details    Type of Service:  Telehealth    Video End Time (time video stopped): 8:42 am     Originating Location (pt. location): Home    Additional Participants in Telehealth Visit: None     Distant Location (provider location):  [facility]     Mode of Communication (Audio Visual or Audio Only):  Audio Visual      Linda Anthony, SLP  November 17, 2020

## 2020-11-17 NOTE — PATIENT INSTRUCTIONS
Recommendations from today's MTM visit:                                                    MTM (medication therapy management) is a service provided by a clinical pharmacist designed to help you get the most of out of your medicines.     1. I spoke with Dr. Miller and he agrees that he can try tapering off pantoprazole, and you can stop folic acid.   2. To taper off pantoprazole, I would recommend taking 1 pill every other day for 2 weeks then stop taking it. If you start experiencing acid reflux or heartburn, you can certainly take Tums or we can go back on the pantoprazole if needed.   3. I would recommend that you stop Nyquil/Dayquil and instead try a non-drowsy antihistamine like Claritin, Allegra, or Zyrtec (over the counter) - 1 tablet every day. These antihistamines should help with your post-nasal drip symptoms.   4. I would recommend that you  another fluticasone nasal spray (over the counter) or request refill from Dr. Nowak. Additionally, being on an antihistamine (per #3 above) may help your runny nose.     It was great to speak with you today.  I value your experience and would be very thankful for your time with providing feedback on our clinic survey. You may receive a survey via email or text message in the next few days.     Next MTM visit: 1 year or sooner if needed    To schedule another MTM appointment, please call the clinic directly or you may call the MTM scheduling line at [phone] or toll-free at [phone].     My Clinical Pharmacist's contact information:                                                      It was a pleasure talking with you today!  Please feel free to contact me with any questions or concerns you have.      Leslie Arredondo, Pharm.D.  Medication Therapy Management Pharmacist  Phone: [phone]

## 2020-11-20 ENCOUNTER — TRANSFERRED RECORDS (OUTPATIENT)
Dept: HEALTH INFORMATION MANAGEMENT | Facility: CLINIC | Age: 70
End: 2020-11-20

## 2020-11-20 ENCOUNTER — ANTICOAGULATION THERAPY VISIT (OUTPATIENT)
Dept: FAMILY MEDICINE | Facility: CLINIC | Age: 70
End: 2020-11-20

## 2020-11-20 ENCOUNTER — HOSPITAL ENCOUNTER (OUTPATIENT)
Dept: SPEECH THERAPY | Facility: CLINIC | Age: 70
Setting detail: THERAPIES SERIES
End: 2020-11-20
Attending: OTOLARYNGOLOGY
Payer: COMMERCIAL

## 2020-11-20 DIAGNOSIS — D68.61 ANTIPHOSPHOLIPID ANTIBODY SYNDROME (H): ICD-10-CM

## 2020-11-20 DIAGNOSIS — I48.0 PAROXYSMAL ATRIAL FIBRILLATION (H): ICD-10-CM

## 2020-11-20 DIAGNOSIS — Z79.01 LONG TERM CURRENT USE OF ANTICOAGULANT THERAPY: ICD-10-CM

## 2020-11-20 DIAGNOSIS — Z86.718 PERSONAL HISTORY OF DVT (DEEP VEIN THROMBOSIS): ICD-10-CM

## 2020-11-20 LAB — INR PPP: 1.9 (ref 0.9–1.1)

## 2020-11-20 PROCEDURE — 92526 ORAL FUNCTION THERAPY: CPT | Mod: GN,95 | Performed by: SPEECH-LANGUAGE PATHOLOGIST

## 2020-11-20 NOTE — PROGRESS NOTES
Haresh Rock is a 70 year old male who is being seen via a billable video visit.      Patient has given verbal consent for Video visit? Yes    Video Start Time: 11:50 am     Telehealth Visit Details    Type of Service:  Telehealth    Video End Time (time video stopped): 12:21     Originating Location (pt. location): Home    Additional Participants in Telehealth Visit: None     Distant Location (provider location):  [facility]     Mode of Communication (Audio Visual or Audio Only):  Audio Visual     Linda Anthony, SLP  November 20, 2020

## 2020-11-20 NOTE — PROGRESS NOTES
ANTICOAGULATION MANAGEMENT     Patient Name:  Haresh Rock  Date:  2020    ASSESSMENT /SUBJECTIVE:    Today's INR result of 1.9 is subtherapeutic. Goal INR of 2.0-3.0      Warfarin dose taken: Less warfarin taken than planned which may be affecting INR    Diet: No new diet changes affecting INR    Medication changes/ interactions: No new medications/supplements affecting INR    Previous INR: Therapeutic     S/S of bleeding or thromboembolism: No    New injury or illness: No    Upcoming surgery, procedure or cardioversion: No    Additional findings: None      PLAN:    Telephone call with Haresh regarding INR result and instructed:     Warfarin Dosing Instructions: The pt reported that he had taken 1.25mg on  and  instead of just1.25mg on Monday and 2.5mg all other days of the week.    Instructed patient to follow up no later than: 1 week  Patient to recheck with home meter    Education provided: Target INR goal and significance of current INR result, Importance of therapeutic range, Importance of following up for INR monitoring at instructed interval and Importance of taking warfarin as instructed      Haresh verbalizes understanding and agrees to warfarin dosing plan.    Instructed to call the Anticoagulation Clinic for any changes, questions or concerns. (#[phone])        Martín Rico RN      OBJECTIVE:  Recent labs: (last 7 days)     20   INR 1.9*         No question data found.  Anticoagulation Summary  As of 2020    INR goal:  2.0-3.0   TTR:  84.6 % (1 y)   INR used for dosin.9 (2020)   Warfarin maintenance plan:  1.25 mg (2.5 mg x 0.5) every Mon; 2.5 mg (2.5 mg x 1) all other days   Full warfarin instructions:  1.25 mg every Mon; 2.5 mg all other days   Weekly warfarin total:  16.25 mg   Plan last modified:  Blanca Rodriguez RN (10/27/2020)   Next INR check:  2020   Priority:  Maintenance   Target end date:  Indefinite    Indications    Long-term (current) use of  anticoagulants [Z79.01] [Z79.01]  Atrial fibrillation (H) [I48.91]  Antiphospholipid antibody syndrome (H) [D68.61]  Personal history of DVT (deep vein thrombosis) [Z86.718]  Atrial fibrillation  unspecified type (H) (Resolved) [I48.91]             Anticoagulation Episode Summary     INR check location:      Preferred lab:  EXTERNAL LAB    Send INR reminders to:  CHELSEA CHILEL    Comments:  JESSICA okay to manage by exception      Anticoagulation Care Providers     Provider Role Specialty Phone number    Anya Nowak MD Referring Family Medicine [phone]

## 2020-11-23 ENCOUNTER — HOSPITAL ENCOUNTER (OUTPATIENT)
Dept: SPEECH THERAPY | Facility: CLINIC | Age: 70
Setting detail: THERAPIES SERIES
End: 2020-11-23
Attending: OTOLARYNGOLOGY
Payer: COMMERCIAL

## 2020-11-23 PROCEDURE — 92526 ORAL FUNCTION THERAPY: CPT | Mod: GN,GT | Performed by: SPEECH-LANGUAGE PATHOLOGIST

## 2020-11-23 NOTE — PROGRESS NOTES
Haresh Rock is a 70 year old male who is being seen via a billable video visit.      Patient has given verbal consent for Video visit? Yes    Video Start Time: 10:32 am    Telehealth Visit Details    Type of Service:  Telehealth    Video End Time (time video stopped): 11:07     Originating Location (pt. location): Home    Additional Participants in Telehealth Visit: None     Distant Location (provider location):  [facility]     Mode of Communication (Audio Visual or Audio Only):  Audio Visual     Linda Anthony, SLP  November 23, 2020

## 2020-11-24 ENCOUNTER — HOSPITAL ENCOUNTER (OUTPATIENT)
Dept: SPEECH THERAPY | Facility: CLINIC | Age: 70
Setting detail: THERAPIES SERIES
End: 2020-11-24
Attending: OTOLARYNGOLOGY
Payer: COMMERCIAL

## 2020-11-24 PROCEDURE — 92526 ORAL FUNCTION THERAPY: CPT | Mod: GN,GT | Performed by: SPEECH-LANGUAGE PATHOLOGIST

## 2020-11-24 NOTE — PROGRESS NOTES
Haresh Rock is a 70 year old male who is being seen via a billable video visit.      Patient has given verbal consent for Video visit? Yes    Video Start Time: 2:28 pm    Telehealth Visit Details    Type of Service:  Telehealth    Video End Time (time video stopped): 3:14 pm     Originating Location (pt. location): Home    Additional Participants in Telehealth Visit: None     Distant Location (provider location):  [facility]     Mode of Communication (Audio Visual or Audio Only):  Audio Visual     Linda Anthony, SLP  November 24, 2020

## 2020-11-27 ENCOUNTER — TRANSFERRED RECORDS (OUTPATIENT)
Dept: HEALTH INFORMATION MANAGEMENT | Facility: CLINIC | Age: 70
End: 2020-11-27

## 2020-11-27 LAB — INR PPP: 1.1 (ref 0.9–1.1)

## 2020-11-30 ENCOUNTER — ANTICOAGULATION THERAPY VISIT (OUTPATIENT)
Dept: FAMILY MEDICINE | Facility: CLINIC | Age: 70
End: 2020-11-30

## 2020-11-30 DIAGNOSIS — Z86.718 PERSONAL HISTORY OF DVT (DEEP VEIN THROMBOSIS): ICD-10-CM

## 2020-11-30 DIAGNOSIS — Z79.01 LONG TERM CURRENT USE OF ANTICOAGULANT THERAPY: ICD-10-CM

## 2020-11-30 DIAGNOSIS — I48.0 PAROXYSMAL ATRIAL FIBRILLATION (H): ICD-10-CM

## 2020-11-30 DIAGNOSIS — D68.61 ANTIPHOSPHOLIPID ANTIBODY SYNDROME (H): ICD-10-CM

## 2020-11-30 LAB — INR PPP: 1.1 (ref 0.9–1.1)

## 2020-11-30 NOTE — PROGRESS NOTES
Spoke with the patient again to confirm that his new pills are 2.5 mg tabs (10/21/20 refill) and that he has been taking his warfarin as 1.25 mg on Mon, 2.5 mg ROW.      He confirms that he is taking 2.5 mg tabs in the recommended maintenance dose.  Will continue to take 2.5 mg tonight and recheck on 12/2/20. Alice Cuenca RN November 30, 2020 4:48 PM

## 2020-11-30 NOTE — PROGRESS NOTES
ANTICOAGULATION MANAGEMENT     Patient Name:  Haresh Rock  Date:  2020    ASSESSMENT /SUBJECTIVE:    20 INR result of 1.1 is subtherapeutic. Goal INR of 2.0-3.0      Warfarin dose taken: Warfarin taken as instructed    Diet: No new diet changes affecting INR    Medication changes/ interactions: stopped protonix last week    Previous INR: Subtherapeutic     S/S of bleeding or thromboembolism: No    New injury or illness: No    Upcoming surgery, procedure or cardioversion: No    Additional findings: Reports continued       PLAN:    Telephone call with Haresh regarding INR result and instructed:     Warfarin Dosing Instructions: 2.5 mg today then continue your current warfarin dose of 1.25 mg M; 2.5 mg ROW    Instructed patient to follow up no later than: 3 days  Patient to recheck with home meter    Education provided: Importance of taking warfarin as instructed, Monitoring for clotting signs and symptoms and When to seek medical attention/emergency care      Haresh verbalizes understanding and agrees to warfarin dosing plan.    Instructed to call the Anticoagulation Clinic for any changes, questions or concerns. (#[phone])        Joellen Molina RN      OBJECTIVE:  Recent labs: (last 7 days)     20   INR 1.1         No question data found.  Anticoagulation Summary  As of 2020    INR goal:  2.0-3.0   TTR:  81.9 % (1 y)   INR used for dosin.1 (2020)   Warfarin maintenance plan:  1.25 mg (2.5 mg x 0.5) every Mon; 2.5 mg (2.5 mg x 1) all other days   Full warfarin instructions:  : 2.5 mg; Otherwise 1.25 mg every Mon; 2.5 mg all other days   Weekly warfarin total:  16.25 mg   Plan last modified:  Blanca Rodriguez RN (10/27/2020)   Next INR check:  12/3/2020   Priority:  Maintenance   Target end date:  Indefinite    Indications    Long-term (current) use of anticoagulants [Z79.01] [Z79.01]  Atrial fibrillation (H) [I48.91]  Antiphospholipid antibody syndrome (H)  [D68.61]  Personal history of DVT (deep vein thrombosis) [Z86.718]  Atrial fibrillation  unspecified type (H) (Resolved) [I48.91]             Anticoagulation Episode Summary     INR check location:      Preferred lab:  EXTERNAL LAB    Send INR reminders to:  CHELSEA CHILEL    Comments:  JESSICA okay to manage by exception      Anticoagulation Care Providers     Provider Role Specialty Phone number    Anya Nowak MD Referring Family Medicine [phone]

## 2020-12-01 ENCOUNTER — HOSPITAL ENCOUNTER (OUTPATIENT)
Dept: SPEECH THERAPY | Facility: CLINIC | Age: 70
Setting detail: THERAPIES SERIES
End: 2020-12-01
Attending: OTOLARYNGOLOGY
Payer: COMMERCIAL

## 2020-12-01 PROCEDURE — 92526 ORAL FUNCTION THERAPY: CPT | Mod: GN,GT | Performed by: SPEECH-LANGUAGE PATHOLOGIST

## 2020-12-01 NOTE — PROGRESS NOTES
Haresh Rock is a 70 year old male who is being seen via a billable video visit.      Patient has given verbal consent for Video visit? Yes    Video Start Time: 12:00 pm    Telehealth Visit Details    Type of Service:  Telehealth    Video End Time (time video stopped): 12:45 pm    Originating Location (pt. location): Home    Additional Participants in Telehealth Visit: None     Distant Location (provider location):  [facility]     Mode of Communication (Audio Visual or Audio Only):  Audio Visual     Linda Anthony, SLP  December 1, 2020

## 2020-12-02 ENCOUNTER — ANTICOAGULATION THERAPY VISIT (OUTPATIENT)
Dept: FAMILY MEDICINE | Facility: CLINIC | Age: 70
End: 2020-12-02

## 2020-12-02 ENCOUNTER — TRANSFERRED RECORDS (OUTPATIENT)
Dept: HEALTH INFORMATION MANAGEMENT | Facility: CLINIC | Age: 70
End: 2020-12-02

## 2020-12-02 DIAGNOSIS — Z79.01 LONG TERM CURRENT USE OF ANTICOAGULANT THERAPY: ICD-10-CM

## 2020-12-02 DIAGNOSIS — I48.0 PAROXYSMAL ATRIAL FIBRILLATION (H): ICD-10-CM

## 2020-12-02 DIAGNOSIS — Z86.718 PERSONAL HISTORY OF DVT (DEEP VEIN THROMBOSIS): ICD-10-CM

## 2020-12-02 DIAGNOSIS — D68.61 ANTIPHOSPHOLIPID ANTIBODY SYNDROME (H): ICD-10-CM

## 2020-12-02 LAB — INR PPP: 2.5 (ref 0.9–1.1)

## 2020-12-02 NOTE — PROGRESS NOTES
ANTICOAGULATION  MANAGEMENT-Patient Home Monitoring Result    Assessment     Therapeutic INR result of 2.5 . Goal range 2.0-3.0. Received via fax from [employer] home INR monitoring company.        Previous INR was therapeutic    Haresh was last contacted by phone:     Plan     Per home monitoring agreement with patient, patient was NOT contacted regarding therapeutic result today.  Patient is to continue current dose and continue to check INR with home monitor per protocol.  ?       OBJECTIVE    INR   Date Value Ref Range Status   2020 2.5 (A) 0.90 - 1.10 Final     Factor 2 Assay   Date Value Ref Range Status   2007 58 (L) 60 - 140 % Final     Comment:     (Note)  CALLED TO [facility](INTERV. RADIOLOGY)[id number],        ASSESSMENT / PLAN  No question data found.  Anticoagulation Summary  As of 2020    INR goal:  2.0-3.0   TTR:  81.5 % (1 y)   INR used for dosin.5 (2020)   Warfarin maintenance plan:  1.25 mg (2.5 mg x 0.5) every Mon; 2.5 mg (2.5 mg x 1) all other days   Full warfarin instructions:  1.25 mg every Mon; 2.5 mg all other days   Weekly warfarin total:  16.25 mg   Plan last modified:  Blanca Rodriguez, RN (10/27/2020)   Next INR check:  2020   Priority:  Maintenance   Target end date:  Indefinite    Indications    Long-term (current) use of anticoagulants [Z79.01] [Z79.01]  Atrial fibrillation (H) [I48.91]  Antiphospholipid antibody syndrome (H) [D68.61]  Personal history of DVT (deep vein thrombosis) [Z86.718]  Atrial fibrillation  unspecified type (H) (Resolved) [I48.91]             Anticoagulation Episode Summary     INR check location:      Preferred lab:  EXTERNAL LAB    Send INR reminders to:  CHELSEA CHILEL    Comments:  JESSICA rodas to manage by exception      Anticoagulation Care Providers     Provider Role Specialty Phone number    Anya Nowak MD Referring Family Medicine [phone]

## 2020-12-03 ENCOUNTER — HOSPITAL ENCOUNTER (OUTPATIENT)
Dept: PHYSICAL THERAPY | Facility: CLINIC | Age: 70
Setting detail: THERAPIES SERIES
End: 2020-12-03
Attending: PSYCHIATRY & NEUROLOGY
Payer: COMMERCIAL

## 2020-12-03 DIAGNOSIS — G20.A1 PARKINSON DISEASE (H): ICD-10-CM

## 2020-12-03 PROCEDURE — 97161 PT EVAL LOW COMPLEX 20 MIN: CPT | Mod: GP | Performed by: PHYSICAL THERAPIST

## 2020-12-03 PROCEDURE — 97110 THERAPEUTIC EXERCISES: CPT | Mod: GP | Performed by: PHYSICAL THERAPIST

## 2020-12-03 NOTE — DISCHARGE INSTRUCTIONS
12/3/20    Try 5 x sit to stand daily for leg strength.    Treadmill - today - 3 minutes at 1.0 mi/hr.  have phone in pocket.      We will do a couple video visits.  Claire Brown

## 2020-12-04 ENCOUNTER — HOSPITAL ENCOUNTER (OUTPATIENT)
Dept: SPEECH THERAPY | Facility: CLINIC | Age: 70
Setting detail: THERAPIES SERIES
End: 2020-12-04
Attending: OTOLARYNGOLOGY
Payer: COMMERCIAL

## 2020-12-04 PROCEDURE — 92526 ORAL FUNCTION THERAPY: CPT | Mod: GN,95 | Performed by: SPEECH-LANGUAGE PATHOLOGIST

## 2020-12-04 NOTE — PROGRESS NOTES
Haresh Rock is a 70 year old male who is being seen via a billable video visit.      Patient has given verbal consent for Video visit? Yes    Video Start Time: 10:01 am     Telehealth Visit Details    Type of Service:  Telehealth    Video End Time (time video stopped): 10:46 am    Originating Location (pt. location): Home    Additional Participants in Telehealth Visit: None      Distant Location (provider location):  [facility]     Mode of Communication (Audio Visual or Audio Only):  Audio Visual     Linda Anthony, SLP  December 4, 2020

## 2020-12-05 NOTE — PROGRESS NOTES
12/03/20 1100   General Information   Start of Care Date 12/03/20   Referring Physician Ángel Hill   Orders Evaluate and Treat as Indicated   Order Date 11/12/20   Medical Diagnosis PD   Special Instructions seeing Linda for slp; has Afib. on meds.     Surgical/Medical history reviewed Yes   Pertinent history of current problem (include personal factors and/or comorbidities that impact the POC) Haresh is here; reports, seeing SLP virtually. hard to keep up w/ exer.  i think it is ok gait.  getting older.  tipsy.  falls?  no.  have 2 cats that get underfoot.  hearing ok.  cats make noise.  i am aware of them buthave to step over them.   lazy boy is on a platform.  6 in.  hard to get off bathroom toilet.    Pertinent Visual History  glasses.     Prior level of functional mobility Ambulation   Ambulation no devices; had knee surgery.  have a walker/ cane.     Current Community Support Family/friend caregiver   Patient role/Employment history Retired   Living environment House/[facility]   Home/Community Accessibility Comments RTS available to him.  5 level house.  does stairs alot.  is not walking outside in cold weather.  has TM.  wife uses.  he used it a few times.     Patient/Family Goals Statement not get tripped up w/ cats.  baseline testing.   lost 40# after H pylori this year.     General Information Comments wife Angelica at home.  was a , retired 2008.   had BMT 2007.    has apple watch - use it for HR/ O2.  % and HR 60-80.     Fall Risk Screen   Fall screen completed by PT   Have you fallen 2 or more times in the past year? No   Have you fallen and had an injury in the past year? No   Timed Up and Go score (seconds) 9.5  (10.6 cog)   Pain   Patient currently in pain No   Vitals Signs   Heart Rate 59   Blood Pressure 119/61   Vital Signs Comments standing bp:  104/57   Cognitive Status Examination   Orientation orientation to person, place and time   Level of Consciousness alert    Follows Commands and Answers Questions 100% of the time   Personal Safety and Judgment intact   Memory intact   Integumentary   Integumentary No deficits were identified   Posture   Posture Forward head position;Protracted shoulders   Posture Comments tends to look down w/ gait   Range of Motion (ROM)   ROM Comment wfls   Strength   Strength Comments wfls   Bed Mobility   Bed Mobility Comments indep   Transfer Skills   Transfer Comments indep but slow/ uses hands.  5x STS- 15.4 sec.     Locomotion   Wheel Chair Mobility Comments n/a   Gait   Gait Comments looks at floor, flexed.    Gait Special Tests   Gait Special Tests 25 FOOT TIMED WALK;DYNAMIC GAIT INDEX   Gait Special Tests 25 Foot Timed Walk   Seconds 8.6   Comments no lob   Gait Special Tests Dynamic Gait Index   Score out of 24 16   Balance   Balance Comments sls 2 sec bilat.  standing can do w/ feet together 30 sec EO.     Sensory Examination   Sensory Perception no deficits were identified   Coordination   Coordination no deficits were identified   Muscle Tone   Muscle Tone no deficits were identified   Planned Therapy Interventions   Planned Therapy Interventions balance training;gait training;neuromuscular re-education;strengthening   Clinical Impression   Criteria for Skilled Therapeutic Interventions Met yes, treatment indicated   PT Diagnosis gait difficulty   Influenced by the following impairments flexed posture, slowed mov't. weight loss   Functional limitations due to impairments falls risk w/ cats, gait w/ poor posture   Clinical Presentation Stable/Uncomplicated   Clinical Decision Making (Complexity) Low complexity   Therapy Frequency other (see comments)   Predicted Duration of Therapy Intervention (days/wks) up to 4x in 90 days   Risk & Benefits of therapy have been explained Yes   Patient, Family & other staff in agreement with plan of care Yes   Clinical Impression Comments 71 yo w/ PD, wants baseline measures.  open to and needs skilled  PT   Education Assessment   Preferred Learning Style Demonstration   Barriers to Learning Physical   GOALS   PT Eval Goals 1;2;3   Goal 1   Goal Identifier falls prevention   Goal Description pt to verbalize 3+ falls prevention ideas   Target Date 03/02/21   Goal 2   Goal Identifier gait   Goal Description pt to walk on his own treadmill x 8-10 min for wellness and exer safely   Target Date 03/02/21   Goal 3   Goal Identifier Sit to stand   Goal Description pt to be indep w/ STS in home and perf 5x STS in 13 sec or less   Target Date 03/02/21   Total Evaluation Time   PT Eval, Low Complexity Minutes (29498) 30      12/03/20 1100   General Information   Start of Care Date 12/03/20   Referring Physician Ángel Hill   Orders Evaluate and Treat as Indicated   Order Date 11/12/20   Medical Diagnosis PD   Special Instructions seeing Linda for slp; has Afib. on meds.     Surgical/Medical history reviewed Yes   Pertinent history of current problem (include personal factors and/or comorbidities that impact the POC) Haresh is here; reports, seeing SLP virtually. hard to keep up w/ exer.  i think it is ok gait.  getting older.  tipsy.  falls?  no.  have 2 cats that get underfoot.  hearing ok.  cats make noise.  i am aware of them buthave to step over them.   lazy boy is on a platform.  6 in.  hard to get off bathroom toilet.    Pertinent Visual History  glasses.     Prior level of functional mobility Ambulation   Ambulation no devices; had knee surgery.  have a walker/ cane.     Current Community Support Family/friend caregiver   Patient role/Employment history Retired   Living environment House/[facility]   Home/Community Accessibility Comments RTS available to him.  5 level house.  does stairs alot.  is not walking outside in cold weather.  has TM.  wife uses.  he used it a few times.     Patient/Family Goals Statement not get tripped up w/ cats.  baseline testing.   lost 40# after H pylori this year.     General Information  Comments wife Angelica at home.  was a , retired 2008.   had BMT 2007.    has apple watch - use it for HR/ O2.  % and HR 60-80.     Fall Risk Screen   Fall screen completed by PT   Have you fallen 2 or more times in the past year? No   Have you fallen and had an injury in the past year? No   Timed Up and Go score (seconds) 9.5  (10.6 cog)   Pain   Patient currently in pain No   Vitals Signs   Heart Rate 59   Blood Pressure 119/61   Vital Signs Comments standing bp:  104/57   Cognitive Status Examination   Orientation orientation to person, place and time   Level of Consciousness alert   Follows Commands and Answers Questions 100% of the time   Personal Safety and Judgment intact   Memory intact   Integumentary   Integumentary No deficits were identified   Posture   Posture Forward head position;Protracted shoulders   Posture Comments tends to look down w/ gait   Range of Motion (ROM)   ROM Comment wfls   Strength   Strength Comments wfls   Bed Mobility   Bed Mobility Comments indep   Transfer Skills   Transfer Comments indep but slow/ uses hands.  5x STS- 15.4 sec.     Locomotion   Wheel Chair Mobility Comments n/a   Gait   Gait Comments looks at floor, flexed.    Gait Special Tests   Gait Special Tests 25 FOOT TIMED WALK;DYNAMIC GAIT INDEX   Gait Special Tests 25 Foot Timed Walk   Seconds 8.6   Comments no lob   Gait Special Tests Dynamic Gait Index   Score out of 24 16   Balance   Balance Comments sls 2 sec bilat.  standing can do w/ feet together 30 sec EO.     Sensory Examination   Sensory Perception no deficits were identified   Coordination   Coordination no deficits were identified   Muscle Tone   Muscle Tone no deficits were identified   Planned Therapy Interventions   Planned Therapy Interventions balance training;gait training;neuromuscular re-education;strengthening   Clinical Impression   Criteria for Skilled Therapeutic Interventions Met yes, treatment indicated   PT Diagnosis gait  difficulty   Influenced by the following impairments flexed posture, slowed mov't. weight loss   Functional limitations due to impairments falls risk w/ cats, gait w/ poor posture   Clinical Presentation Stable/Uncomplicated   Clinical Decision Making (Complexity) Low complexity   Therapy Frequency other (see comments)   Predicted Duration of Therapy Intervention (days/wks) up to 4x in 90 days   Risk & Benefits of therapy have been explained Yes   Patient, Family & other staff in agreement with plan of care Yes   Clinical Impression Comments 69 yo w/ PD, wants baseline measures.  open to and needs skilled PT   Education Assessment   Preferred Learning Style Demonstration   Barriers to Learning Physical   GOALS   PT Eval Goals 1;2;3   Goal 1   Goal Identifier falls prevention   Goal Description pt to verbalize 3+ falls prevention ideas   Target Date 03/02/21   Goal 2   Goal Identifier gait   Goal Description pt to walk on his own treadmill x 8-10 min for wellness and exer safely   Target Date 03/02/21   Goal 3   Goal Identifier Sit to stand   Goal Description pt to be indep w/ STS in home and perf 5x STS in 13 sec or less   Target Date 03/02/21   Total Evaluation Time   PT Bautista Low Complexity Minutes (41473) 30

## 2020-12-05 NOTE — PROGRESS NOTES
[facility]        OUTPATIENT PHYSICAL THERAPY FUNCTIONAL EVALUATION  PLAN OF TREATMENT FOR OUTPATIENT REHABILITATION  (COMPLETE FOR INITIAL CLAIMS ONLY)  Patient's Last Name, First Name, M.I.  YOB: 1950  Haresh Rock     Provider's Name   [facility]   Medical Record No.  [id number]     Start of Care Date:  12/03/20   Onset Date:   11/12/20   Type:     _X__PT   ____OT  ____SLP Medical Diagnosis:     PD, weight loss   PT Diagnosis:  gait difficulty Visits from SOC:  1                              __________________________________________________________________________________  Plan of Treatment/Functional Goals:  balance training, gait training, neuromuscular re-education, strengthening        GOALS  falls prevention  pt to verbalize 3+ falls prevention ideas  03/02/21    gait  pt to walk on his own treadmill x 8-10 min for wellness and exer safely  03/02/21    Sit to stand  pt to be indep w/ STS in home and perf 5x STS in 13 sec or less  03/02/21      Therapy Frequency:  other (see comments)   Predicted Duration of Therapy Intervention:  up to 4x in 90 days    Claire Gregg, PT                                    I CERTIFY THE NEED FOR THESE SERVICES FURNISHED UNDER        THIS PLAN OF TREATMENT AND WHILE UNDER MY CARE     (Physician co-signature of this document indicates review and certification of the therapy plan).                Certification Date From:    12/3/20--  Certification Date To:   3/2/21    Referring Provider:  Ángel Hill    Initial Assessment  See Epic Evaluation- Start of Care Date: 12/03/20

## 2020-12-08 ENCOUNTER — HOSPITAL ENCOUNTER (OUTPATIENT)
Dept: SPEECH THERAPY | Facility: CLINIC | Age: 70
Setting detail: THERAPIES SERIES
End: 2020-12-08
Attending: OTOLARYNGOLOGY
Payer: COMMERCIAL

## 2020-12-08 PROCEDURE — 92526 ORAL FUNCTION THERAPY: CPT | Mod: GN,95 | Performed by: SPEECH-LANGUAGE PATHOLOGIST

## 2020-12-08 NOTE — PROGRESS NOTES
Haresh Rock is a 70 year old male who is being seen via a billable video visit.      Patient has given verbal consent for Video visit? Yes    Video Start Time: 11:18 am    Telehealth Visit Details    Type of Service:  Telehealth    Video End Time (time video stopped): 11:48 am    Originating Location (pt. location): Home    Additional Participants in Telehealth Visit: None     Distant Location (provider location):  [facility]     Mode of Communication (Audio Visual or Audio Only):  Audio Visual     Linda Anthony, SLP  December 8, 2020

## 2020-12-11 ENCOUNTER — ANTICOAGULATION THERAPY VISIT (OUTPATIENT)
Dept: FAMILY MEDICINE | Facility: CLINIC | Age: 70
End: 2020-12-11

## 2020-12-11 ENCOUNTER — HOSPITAL ENCOUNTER (OUTPATIENT)
Dept: SPEECH THERAPY | Facility: CLINIC | Age: 70
Setting detail: THERAPIES SERIES
End: 2020-12-11
Attending: OTOLARYNGOLOGY
Payer: COMMERCIAL

## 2020-12-11 DIAGNOSIS — I48.0 PAROXYSMAL ATRIAL FIBRILLATION (H): ICD-10-CM

## 2020-12-11 DIAGNOSIS — D68.61 ANTIPHOSPHOLIPID ANTIBODY SYNDROME (H): ICD-10-CM

## 2020-12-11 DIAGNOSIS — Z86.718 PERSONAL HISTORY OF DVT (DEEP VEIN THROMBOSIS): ICD-10-CM

## 2020-12-11 DIAGNOSIS — Z79.01 LONG TERM CURRENT USE OF ANTICOAGULANT THERAPY: ICD-10-CM

## 2020-12-11 LAB — INR PPP: 3.4 (ref 0.9–1.1)

## 2020-12-11 PROCEDURE — 92526 ORAL FUNCTION THERAPY: CPT | Mod: GN,GT | Performed by: SPEECH-LANGUAGE PATHOLOGIST

## 2020-12-11 NOTE — PROGRESS NOTES
Haresh Rock is a 70 year old male who is being seen via a billable video visit.      Patient has given verbal consent for Video visit? Yes    Video Start Time: 9:38 am    Telehealth Visit Details    Type of Service:  Telehealth    Video End Time (time video stopped): 1:09 pm    Originating Location (pt. location): Home    Additional Participants in Telehealth Visit: None    Distant Location (provider location):  [facility]     Mode of Communication (Audio Visual or Audio Only):  Audio Visual    Linda Anthony, SLP  December 11, 2020

## 2020-12-11 NOTE — PROGRESS NOTES
ANTICOAGULATION MANAGEMENT     Patient Name:  Haresh Rock  Date:  12/11/2020    ASSESSMENT /SUBJECTIVE:    Today's INR result of 3.4 is supratherapeutic. Goal INR of 2.0-3.0      Warfarin dose taken: Warfarin taken as instructed    Diet: No new diet changes affecting INR    Medication changes/ interactions: No new medications/supplements affecting INR    Previous INR: Therapeutic     S/S of bleeding or thromboembolism: No    New injury or illness: No    Upcoming surgery, procedure or cardioversion: No    Additional findings: Patient's INR was Sub last week with boost dose given and maint dose change. Now supra; may need to change Maint dose back to previous.  Will await result next week to determine.      PLAN:    Telephone call with Haresh regarding INR result and instructed:     Warfarin Dosing Instructions: hold x 1 then continue your current warfarin dose of 1.25 mg Mon; 2.5 mg all toher days    Instructed patient to follow up no later than: 1 week  Patient to recheck with home meter    Education provided: Monitoring for bleeding signs and symptoms and Monitoring for clotting signs and symptoms      Haresh verbalizes understanding and agrees to warfarin dosing plan.    Instructed to call the Anticoagulation Clinic for any changes, questions or concerns. (#[phone])        Allyn Doe RN      OBJECTIVE:  Recent labs: (last 7 days)     12/11/20   INR 3.4*         No question data found.  Anticoagulation Summary  As of 12/11/2020    INR goal:  2.0-3.0   TTR:  80.4 % (1 y)   INR used for dosing:  3.4 (12/11/2020)   Warfarin maintenance plan:  1.25 mg (2.5 mg x 0.5) every Mon; 2.5 mg (2.5 mg x 1) all other days   Full warfarin instructions:  12/11: Hold; Otherwise 1.25 mg every Mon; 2.5 mg all other days   Weekly warfarin total:  16.25 mg   Plan last modified:  Blanca Rodriguez RN (10/27/2020)   Next INR check:  12/18/2020   Priority:  Maintenance   Target end date:  Indefinite    Indications    Long-term  (current) use of anticoagulants [Z79.01] [Z79.01]  Atrial fibrillation (H) [I48.91]  Antiphospholipid antibody syndrome (H) [D68.61]  Personal history of DVT (deep vein thrombosis) [Z86.718]  Atrial fibrillation  unspecified type (H) (Resolved) [I48.91]             Anticoagulation Episode Summary     INR check location:      Preferred lab:  [facility]TERNAL [facility]    Send INR reminders to:  CHELSEA CHILEL    Comments:  JESSICA villafuerteay to manage by exception      Anticoagulation Care Providers     Provider Role Specialty Phone number    Anya Nowak MD Referring Family Medicine [phone]

## 2020-12-15 ENCOUNTER — TELEPHONE (OUTPATIENT)
Dept: SPEECH THERAPY | Facility: CLINIC | Age: 70
End: 2020-12-15

## 2020-12-15 NOTE — TELEPHONE ENCOUNTER
LVM asking pt to call back and reschedule video swallow study missed on 12/10, per provider request.    Preferably he should be scheduled with provider but any other SLP is ok.    Left [facility] number for scheduling.

## 2020-12-18 ENCOUNTER — TRANSFERRED RECORDS (OUTPATIENT)
Dept: HEALTH INFORMATION MANAGEMENT | Facility: CLINIC | Age: 70
End: 2020-12-18

## 2020-12-18 ENCOUNTER — ANTICOAGULATION THERAPY VISIT (OUTPATIENT)
Dept: FAMILY MEDICINE | Facility: CLINIC | Age: 70
End: 2020-12-18

## 2020-12-18 DIAGNOSIS — Z79.01 LONG TERM CURRENT USE OF ANTICOAGULANT THERAPY: ICD-10-CM

## 2020-12-18 DIAGNOSIS — D68.61 ANTIPHOSPHOLIPID ANTIBODY SYNDROME (H): ICD-10-CM

## 2020-12-18 DIAGNOSIS — I48.0 PAROXYSMAL ATRIAL FIBRILLATION (H): ICD-10-CM

## 2020-12-18 DIAGNOSIS — Z86.718 PERSONAL HISTORY OF DVT (DEEP VEIN THROMBOSIS): ICD-10-CM

## 2020-12-18 LAB — INR PPP: 1.8 (ref 0.9–1.1)

## 2020-12-18 NOTE — PROGRESS NOTES
ANTICOAGULATION MANAGEMENT     Patient Name:  Haresh Rock  Date:  2020    ASSESSMENT /SUBJECTIVE:    Today's INR result of 1.8 is subtherapeutic. Goal INR of 2.0-3.0      Warfarin dose taken: Warfarin taken as instructed    Diet: No new diet changes affecting INR    Medication changes/ interactions: No new medications/supplements affecting INR    Previous INR: Supratherapeutic     S/S of bleeding or thromboembolism: No    New injury or illness: No    Upcoming surgery, procedure or cardioversion: No    Additional findings: INR may be sub from held dose last week. Will not change dose at this time as his INR bounces around a lot. Suspect he may be a slow metabolizer. Continue current dose and recheck in 1 week, check Thursday as we will be closed Friday.      PLAN:    Telephone call with Haresh regarding INR result and instructed:     Warfarin Dosing Instructions: Continue your current warfarin dose 1.25 mg M and 2.5 mg ROW    Instructed patient to follow up no later than: 1 week  Patient to recheck with home meter    Education provided: None required      Haresh verbalizes understanding and agrees to warfarin dosing plan.    Instructed to call the Anticoagulation Clinic for any changes, questions or concerns. (#[phone])        Darcy Michele RN      OBJECTIVE:  Recent labs: (last 7 days)     20   INR 1.8*         No question data found.  Anticoagulation Summary  As of 2020    INR goal:  2.0-3.0   TTR:  79.7 % (1 y)   INR used for dosin.8 (2020)   Warfarin maintenance plan:  1.25 mg (2.5 mg x 0.5) every Mon; 2.5 mg (2.5 mg x 1) all other days   Full warfarin instructions:  1.25 mg every Mon; 2.5 mg all other days   Weekly warfarin total:  16.25 mg   Plan last modified:  Blanca Rodriguez RN (10/27/2020)   Next INR check:  2020   Priority:  Maintenance   Target end date:  Indefinite    Indications    Long-term (current) use of anticoagulants [Z79.01] [Z79.01]  Atrial fibrillation  (H) [I48.91]  Antiphospholipid antibody syndrome (H) [D68.61]  Personal history of DVT (deep vein thrombosis) [Z86.718]  Atrial fibrillation  unspecified type (H) (Resolved) [I48.91]             Anticoagulation Episode Summary     INR check location:      Preferred lab:  [facility]TER[facility]    Send INR reminders to:  CHELSEA CHILEL    Comments:  JESSICA villafuerteay to manage by exception      Anticoagulation Care Providers     Provider Role Specialty Phone number    Anya Nowak MD Referring Family Medicine [phone]

## 2020-12-22 ENCOUNTER — THERAPY VISIT (OUTPATIENT)
Dept: SPEECH THERAPY | Facility: CLINIC | Age: 70
End: 2020-12-22
Payer: COMMERCIAL

## 2020-12-22 ENCOUNTER — ANCILLARY PROCEDURE (OUTPATIENT)
Dept: GENERAL RADIOLOGY | Facility: CLINIC | Age: 70
End: 2020-12-22
Attending: OTOLARYNGOLOGY
Payer: COMMERCIAL

## 2020-12-22 DIAGNOSIS — E83.110 HEREDITARY HEMOCHROMATOSIS (H): ICD-10-CM

## 2020-12-22 DIAGNOSIS — R13.12 OROPHARYNGEAL DYSPHAGIA: Primary | ICD-10-CM

## 2020-12-22 DIAGNOSIS — C81.99 HODGKIN'S DISEASE OF EXTRANODAL OR SOLID ORGAN SITE (H): ICD-10-CM

## 2020-12-22 DIAGNOSIS — R13.10 DYSPHAGIA, UNSPECIFIED TYPE: ICD-10-CM

## 2020-12-22 PROCEDURE — 74230 X-RAY XM SWLNG FUNCJ C+: CPT | Mod: GC | Performed by: RADIOLOGY

## 2020-12-22 PROCEDURE — 92611 MOTION FLUOROSCOPY/SWALLOW: CPT | Mod: GN | Performed by: SPEECH-LANGUAGE PATHOLOGIST

## 2020-12-22 RX ORDER — BARIUM SULFATE 400 MG/ML
10 SUSPENSION ORAL ONCE
Status: COMPLETED | OUTPATIENT
Start: 2020-12-22 | End: 2020-12-22

## 2020-12-22 RX ADMIN — BARIUM SULFATE 10 ML: 400 SUSPENSION ORAL at 13:57

## 2020-12-23 NOTE — PROGRESS NOTES
Speech-Language Pathology Department   EVALUATION  [facility]  Video Swallow Study Evaluation    12/22/20 1300   General Information   Type Of Visit Initial   Start Of Care Date 12/22/20   Referring Physician Cindy Mitchell MD    Orders Evaluate And Treat   Orders Comment Video Swallow Study   Medical Diagnosis Dysphagia, unspecified    Onset Of Illness/injury Or Date Of Surgery 12/22/20   Precautions/limitations Swallowing Precautions   Hearing WFL for conversational speech production    Pertinent History of Current Problem/OT: Additional Occupational Profile Info Haresh Rock is a 70 year old male with a complex PMH significant for Parkinson's disease, 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 Pt has been completing swallow therapy with Linda Anthony, SLP at [facility] location. He is completing pharyngeal strengthening exercises and EMST-150. He presents today for repeat video swallow study evaluation.    Respiratory Status Room air   Prior Level Of Function Swallowing   Prior Level Of Function Comment DD2 with thin liquids   Patient Role/employment History Retired  (Worked in [employer])   Living Environment [employer]   General Observations Pleasant and cooperative, flat affect, masked facial expresion, slow movement    Patient/family Goals To evaluate swallow function after completing swallowing therapy   Clinical Swallow Evaluation   Oral Musculature generally intact   Structural Abnormalities none present   Dentition present and adequate   Secretion Management problems swallowing secretions   Mucosal Quality adequate   Mandibular Strength and Mobility intact   Oral Labial Strength and Mobility WFL;other (see comments)   Lingual  Strength and Mobility WFL   Velar Elevation intact   Buccal Strength and Mobility intact   Laryngeal Function Throat clear;Cough;Swallow;Voicing initiated   Oral Musculature Comments Movements functional, but slow. Cough strong with cues. Masked facial apperance throughout evaluation.    VFSS Eval: Radiology   Radiologist Resident   Views Taken left lateral;A/P   Physical Location of Procedure [facility]   VFSS Eval: Thin Liquid Texture Trial   Mode of Presentation, Thin Liquid cup;self-fed   Order of Presentation 1,3,5,8,9,11   Preparatory Phase WFL   Oral Phase, Thin Liquid Poor AP movement;Premature pharyngeal entry  (intermittent tongue pumping)   Pharyngeal Phase, Thin Liquid Pharyngeal wall coating;Residue in valleculae;Residue in pyriform sinus   Rosenbek's Penetration Aspiration Scale: Thin Liquid Trial Results 8 - contrast passes glottis, visible subglottic residue remains, absent patient response (aspiration)   Response to Aspiration absent response, silent aspiration   Diagnostic Statement Penetration to the cords that results in silent aspiration during and after the swallow. Pt independently uses modified supraglottic swallow (swallow-cough) strategy that is helpful to reduce some, but not all subglottic residuals. Moderate to moderately severe vallecular and piriform sinus stasis with posterior pharyngeal wall coating. Pt tried to swallow barium tablet but was unable to move past the oral cavity. AP view unremarkable.    Successful Strategies Trialed During Procedure, Thin Liquid other (see comments)  (supraglottic swallow)   VFSS Eval: Nectar Thick Liquid Texture Trial   Mode of Presentation, Nectar cup;self-fed   Order of Presentation 2,6   Preparatory Phase WFL   Oral Phase, Nectar Poor AP movement;Premature pharyngeal entry  (intermittent tongue pumping)   Pharyngeal Phase, Nectar Pharyngeal wall coating;Residue in valleculae;Residue in pyriform sinus   Rosenbek's Penetration Aspiration Scale:  Nectar-Thick Liquid Trial Results 8 - contrast passes glottis, visible subglottic residue remains, absent patient response (aspiration)   Response to Aspiration, Nectar absent response, silent aspiration   Diagnostic Statement Penetration to the cords with silent aspiration after the swallow. Pt used modified supraglottic swallow strategy (swallow-cough-swallow), which clears some, but not all the subglottic residuals. Moderate to moderately severe vallecular/piriform residuals with posterior pharyngeal wall lining.    Successful Strategies Trialed During Procedure, Nectar other (see comments)  (modified supraglottic swallow)   VFSS Eval: Puree Solid Texture Trial   Mode of Presentation, Puree spoon;self-fed   Order of Presentation 4,7,12,13  (12/13-AP)   Preparatory Phase WFL   Oral Phase, Puree Poor AP movement;Premature pharyngeal entry;other (see comments)  (intermittent tongue pumping)   Pharyngeal Phase, Puree Pharyngeal wall coating;Residue in valleculae;Residue in pyriform sinus   Rosenbek's Penetration Aspiration Scale: Puree Food Trial Results 2 - contrast enters airway, remains above the vocal cords, no residue remains (penetration)   Diagnostic Statement Penetration with no residuals remaining in the laryngeal vestibule. Suspect possible penetration to the cords/aspiration of puree/liquid residuals in later trials. Severe vallecular stasis with residuals lining the AE folds into the piriforms. A liquid wash is helpful to reduce, but not clear residuals. AP view reveals more residuals on the left>right. Left head turn trialed, but not effective.   VFSS Eval: Solid Food Texture Trial   Mode of Presentation, Solid self-fed   Order of Presentation 10   Preparatory Phase WFL   Oral Phase, Solid Poor AP movement;Premature pharyngeal entry;other (see comments)  (intermittent tongue pumping)   Pharyngeal Phase, Solid Residue in valleculae   Rosenbek's Penetration Aspiration Scale: Solid Food Trial Results 2 -  contrast enters airway, remains above the vocal cords, no residue remains (penetration)   Diagnostic Statement Suspect penetration with no remaining residuals in laryngeal vestibule. No aspiration. Vallecular residue extending along the AE folds.   Swallow Compensations   Swallow Compensations Alternate viscosity of consistencies;Pacing;Reduce amounts;Multiple swallow;Supraglottic swallow   Results Suspect silent aspiration   Educational Assessment   Barriers to Learning Cognitive   General Therapy Interventions   Planned Therapy Interventions Dysphagia Treatment   Dysphagia treatment Modified diet education;Instruction of safe swallow strategies;Compensatory strategies for swallowing;Oropharyngeal exercise training   Swallow Eval: Clinical Impressions   Skilled Criteria for Therapy Intervention Skilled criteria met.  Treatment indicated.   Dysphagia Outcome Severity Scale (KATHRYN) Level 2 - KATHRYN   Treatment Diagnosis Moderately severe oropharyngeal dysphagia   Diet texture recommendations Dysphagia diet level 2;Thin liquids   Recommended Feeding/Eating Techniques alternate between small bites and sips of food/liquid;maintain upright posture during/after eating for 30 mins;small sips/bites;other (see comments)  (modified supraglottic swallow, aggressive oral cares)   Rehab Potential fair, will monitor progress closely   Therapy Frequency other (see comments)  (2x/week)   Predicted Duration of Therapy Intervention (days/wks) up to 12 weeks    Anticipated Discharge Disposition home w/ outpatient services  (continue services in [place] [facility])   Risks and Benefits of Treatment have been explained. Yes   Patient, family and/or staff in agreement with Plan of Care Yes   Clinical Impression Comments Haresh Rock presents today with moderately severe oropharyngeal dysphagia characterized by incomplete epiglottic inversion, diffuse pharygeal weakness and silent aspiration. Oral phase is characterized by difficulty with AP  movement and intermittent tongue pumping. Pt demonstrates penetration to the cords with subsequent silent aspiration during and after the swallow with thin and nectar thick liquids. He independently uses the modified supraglottic swallow (swallow-cough-swallow), which helps to reduce, but not clear subglottic residuals. There are moderate to moderately severe residuals in valleculae and piriforms that increase as PO viscosity increases. A posterior pharyngeal wall coating is observed with liquid textures. Liquid wash is helpful to reduce, but not clear residuals. Possible aspiration of puree and solid residuals during liquid wash. Pt tried to take barium tablet with liquids, but could move it past the oral cavity. AP view reveals residuals are great on the left>right. Left head turn trialed, but not effective. Pt remains at high risk of aspiration during and after the swallow. Recommend pt continue with DD2 and thin liquids with use of modified supraglottic swallow strategy and aggressive oral cares. If pt continues to lose weight, enteral support may be needed. Trained pt re: anatomy/physiology of swallowing, results of today's study and diet recommendations. Pt will benefit from continued SLP services to ensure diet tolerance and continue exercise program. Pt will continue services at [facility] location.    Swallow Goals   SLP Swallow Goals 1;2   Swallow Goal 1   Goal Identifier Diet tolerance    Goal Description 1. Pt will tolerate DD2 with thin liquids with independent use of supraglottic swallow (swallow-cough-swallow) in 9/10 PO trials and no adverse medical events over a 3 month period.    Target Date 03/22/21   Swallow Goal 2   Goal Identifier Exercise    Goal Description 2. Pt will complete established exercise programming for management of dysphagia as recommended by the treating SLP.    Target Date 03/22/21   Total Session Time   SLP Eval: VideoFluoroscopic Swallow function Minutes (15118) 35   Total  Evaluation Time 35     Thank you for the referral of Haresh Rock. If you have any questions about this report, please contact me using the information below.     JOSE LUIS [place] MA (music), CCC-SLP   Speech Language Pathologist  [place] Trained Vocologist   [facility]  Dept. of Otolaryngology  Department of Rehabilitation Services  [address] [place] [id number]  Email: [email or web address]   Pronouns: she/her/hers

## 2020-12-24 ENCOUNTER — HOSPITAL ENCOUNTER (OUTPATIENT)
Dept: PHYSICAL THERAPY | Facility: CLINIC | Age: 70
Setting detail: THERAPIES SERIES
End: 2020-12-24
Attending: OTOLARYNGOLOGY
Payer: COMMERCIAL

## 2020-12-24 PROCEDURE — 97530 THERAPEUTIC ACTIVITIES: CPT | Mod: GP,95 | Performed by: PHYSICAL THERAPIST

## 2020-12-24 PROCEDURE — 97110 THERAPEUTIC EXERCISES: CPT | Mod: GP,GT | Performed by: PHYSICAL THERAPIST

## 2020-12-26 ENCOUNTER — TRANSFERRED RECORDS (OUTPATIENT)
Dept: HEALTH INFORMATION MANAGEMENT | Facility: CLINIC | Age: 70
End: 2020-12-26

## 2020-12-26 LAB — INR PPP: 3.4 (ref 0.9–1.1)

## 2020-12-28 ENCOUNTER — ANTICOAGULATION THERAPY VISIT (OUTPATIENT)
Dept: FAMILY MEDICINE | Facility: CLINIC | Age: 70
End: 2020-12-28

## 2020-12-28 DIAGNOSIS — Z79.01 LONG TERM CURRENT USE OF ANTICOAGULANT THERAPY: ICD-10-CM

## 2020-12-28 DIAGNOSIS — D68.61 ANTIPHOSPHOLIPID ANTIBODY SYNDROME (H): ICD-10-CM

## 2020-12-28 DIAGNOSIS — Z86.718 PERSONAL HISTORY OF DVT (DEEP VEIN THROMBOSIS): ICD-10-CM

## 2020-12-28 DIAGNOSIS — I48.0 PAROXYSMAL ATRIAL FIBRILLATION (H): ICD-10-CM

## 2020-12-28 NOTE — PROGRESS NOTES
ANTICOAGULATION MANAGEMENT     Patient Name:  Haresh Rock  Date:  12/28/2020    ASSESSMENT /SUBJECTIVE:    Today's INR result of 3.4 is supratherapeutic. Goal INR of 2.0-3.0      Warfarin dose taken: Warfarin taken as instructed    Diet: No new diet changes affecting INR    Medication changes/ interactions: No new medications/supplements affecting INR    Previous INR: Supratherapeutic     S/S of bleeding or thromboembolism: No    New injury or illness: No    Upcoming surgery, procedure or cardioversion: No    Additional findings: None      PLAN:    Telephone call with Haresh regarding INR result and instructed:     Warfarin Dosing Instructions: Change your warfarin dose to 1.25mg every Mon, Thu; 2.5 mg all other days  . (7.7 % change)    Instructed patient to follow up no later than: 1 week  Patient to recheck with home meter    Education provided: Target INR goal and significance of current INR result      Haresh verbalizes understanding and agrees to warfarin dosing plan.    Instructed to call the Anticoagulation Clinic for any changes, questions or concerns. (#[phone])        Hari Saavedra RN      OBJECTIVE:  Recent labs: (last 7 days)     12/26/20   INR 3.4*         No question data found.  Anticoagulation Summary  As of 12/28/2020    INR goal:  2.0-3.0   TTR:  78.8 % (1 y)   INR used for dosing:  3.4 (12/26/2020)   Warfarin maintenance plan:  1.25 mg (2.5 mg x 0.5) every Mon; 2.5 mg (2.5 mg x 1) all other days   Full warfarin instructions:  1.25 mg every Mon; 2.5 mg all other days   Weekly warfarin total:  16.25 mg   Plan last modified:  Blanca Rodriguez RN (10/27/2020)   Next INR check:  1/8/2021   Priority:  Maintenance   Target end date:  Indefinite    Indications    Long-term (current) use of anticoagulants [Z79.01] [Z79.01]  Atrial fibrillation (H) [I48.91]  Antiphospholipid antibody syndrome (H) [D68.61]  Personal history of DVT (deep vein thrombosis) [Z86.718]  Atrial fibrillation  unspecified type  (H) (Resolved) [I48.91]             Anticoagulation Episode Summary     INR check location:      Preferred lab:  EXTERNAL LAB    Send INR reminders to:  CHELSEA CHILEL    Comments:  JESSICA okay to manage by exception      Anticoagulation Care Providers     Provider Role Specialty Phone number    Anya Nowak MD Referring Family Medicine [phone]

## 2020-12-28 NOTE — PROGRESS NOTES
Anticoagulation Management    Unable to reach Haresh today.    Today's INR result of 3.4 is supratherapeutic (goal INR of 2.0-3.0).  Result received from: Home Monitor    Follow up required to confirm warfarin dose taken and assess for [facility]      Anticoagulation clinic to follow up    Blanca Rodriguez RN

## 2020-12-29 NOTE — PROGRESS NOTES
Haresh Rock is a 70 year old male who is being seen via a billable video visit.      Patient has given verbal consent for Video visit? Yes    Video Start Time: 1010    Telehealth Visit Details    Type of Service:  Telehealth    Video End Time (time video stopped): 1040    Originating Location (pt. location): Home    Additional Participants in Telehealth Visit: none    Distant Location (provider location):  [facility]     Mode of Communication (Audio Visual or Audio Only):  both      Claire Gregg, PT  December 29, 2020

## 2021-01-01 ENCOUNTER — TRANSFERRED RECORDS (OUTPATIENT)
Dept: HEALTH INFORMATION MANAGEMENT | Facility: CLINIC | Age: 71
End: 2021-01-01

## 2021-01-01 LAB — INR PPP: 2.9 (ref 0.9–1.1)

## 2021-01-04 ENCOUNTER — ANTICOAGULATION THERAPY VISIT (OUTPATIENT)
Dept: FAMILY MEDICINE | Facility: CLINIC | Age: 71
End: 2021-01-04

## 2021-01-04 DIAGNOSIS — Z86.718 PERSONAL HISTORY OF DVT (DEEP VEIN THROMBOSIS): ICD-10-CM

## 2021-01-04 DIAGNOSIS — I48.0 PAROXYSMAL ATRIAL FIBRILLATION (H): ICD-10-CM

## 2021-01-04 DIAGNOSIS — D68.61 ANTIPHOSPHOLIPID ANTIBODY SYNDROME (H): ICD-10-CM

## 2021-01-04 DIAGNOSIS — Z79.01 LONG TERM CURRENT USE OF ANTICOAGULANT THERAPY: ICD-10-CM

## 2021-01-04 NOTE — PROGRESS NOTES
ANTICOAGULATION  MANAGEMENT-Patient Home Monitoring Result    Assessment     Therapeutic INR result of 2.9 . Goal range 2.0-3.0. Received via fax from [employer] home INR monitoring company.        Previous INR was therapeutic    Haresh was last contacted by phone: Today - VM left    Plan     Per home monitoring agreement with patient, patient was NOT contacted regarding therapeutic result today.  Patient is to continue current dose and continue to check INR with home monitor per protocol.  ?       OBJECTIVE    INR   Date Value Ref Range Status   2021 2.9 (A) 0.90 - 1.10 Final     Factor 2 Assay   Date Value Ref Range Status   2007 58 (L) 60 - 140 % Final     Comment:     (Note)  CALLED TO TAMARA(INTERV. RADIOLOGY)[id number],        ASSESSMENT / PLAN  No question data found.  Anticoagulation Summary  As of 2021    INR goal:  2.0-3.0   TTR:  77.4 % (1 y)   INR used for dosin.9 (2021)   Warfarin maintenance plan:  1.25 mg (2.5 mg x 0.5) every Mon, Thu; 2.5 mg (2.5 mg x 1) all other days   Full warfarin instructions:  1.25 mg every Mon, Thu; 2.5 mg all other days   Weekly warfarin total:  15 mg   No change documented:  Macrina Preston RN   Plan last modified:  Hari Saavedra RN (2020)   Next INR check:  2021   Priority:  Maintenance   Target end date:  Indefinite    Indications    Long-term (current) use of anticoagulants [Z79.01] [Z79.01]  Atrial fibrillation (H) [I48.91]  Antiphospholipid antibody syndrome (H) [D68.61]  Personal history of DVT (deep vein thrombosis) [Z86.718]  Atrial fibrillation  unspecified type (H) (Resolved) [I48.91]             Anticoagulation Episode Summary     INR check location:      Preferred lab:  EXTERNAL LAB    Send INR reminders to:  CHELSEA CHILEL    Comments:  JESSICA rodas to manage by exception      Anticoagulation Care Providers     Provider Role Specialty Phone number    Anya Nowak MD Referring Family Medicine [phone]          Macrina  Austin RN, BSN, PHN

## 2021-01-08 ENCOUNTER — TRANSFERRED RECORDS (OUTPATIENT)
Dept: HEALTH INFORMATION MANAGEMENT | Facility: CLINIC | Age: 71
End: 2021-01-08

## 2021-01-08 ENCOUNTER — ANTICOAGULATION THERAPY VISIT (OUTPATIENT)
Dept: FAMILY MEDICINE | Facility: CLINIC | Age: 71
End: 2021-01-08

## 2021-01-08 DIAGNOSIS — D68.61 ANTIPHOSPHOLIPID ANTIBODY SYNDROME (H): ICD-10-CM

## 2021-01-08 DIAGNOSIS — I48.0 PAROXYSMAL ATRIAL FIBRILLATION (H): ICD-10-CM

## 2021-01-08 DIAGNOSIS — Z86.718 PERSONAL HISTORY OF DVT (DEEP VEIN THROMBOSIS): ICD-10-CM

## 2021-01-08 DIAGNOSIS — Z79.01 LONG TERM CURRENT USE OF ANTICOAGULANT THERAPY: ICD-10-CM

## 2021-01-08 LAB — INR PPP: 2.2 (ref 0.9–1.1)

## 2021-01-08 NOTE — PROGRESS NOTES
ANTICOAGULATION  MANAGEMENT-Patient Home Monitoring Result    Assessment     Therapeutic INR result of 2.2 . Goal range 2.0-3.0. Received via fax from [employer] home INR monitoring company.        Previous INR was therapeutic    Haresh was last contacted by phone: 21    Plan     Per home monitoring agreement with patient, patient was NOT contacted regarding therapeutic result today.  Patient is to continue current dose and continue to check INR with home monitor per protocol.  ?       OBJECTIVE    INR   Date Value Ref Range Status   2021 2.2 (A) 0.90 - 1.10 Final     Factor 2 Assay   Date Value Ref Range Status   2007 58 (L) 60 - 140 % Final     Comment:     (Note)  CALLED TO TAMARA(INTERV. RADIOLOGY)[id number],        ASSESSMENT / PLAN  No question data found.  Anticoagulation Summary  As of 2021    INR goal:  2.0-3.0   TTR:  77.6 % (1 y)   INR used for dosin.2 (2021)   Warfarin maintenance plan:  1.25 mg (2.5 mg x 0.5) every Mon, Thu; 2.5 mg (2.5 mg x 1) all other days   Full warfarin instructions:  1.25 mg every Mon, Thu; 2.5 mg all other days   Weekly warfarin total:  15 mg   Plan last modified:  Hari Saavedra RN (2020)   Next INR check:  1/15/2021   Priority:  Maintenance   Target end date:  Indefinite    Indications    Long-term (current) use of anticoagulants [Z79.01] [Z79.01]  Atrial fibrillation (H) [I48.91]  Antiphospholipid antibody syndrome (H) [D68.61]  Personal history of DVT (deep vein thrombosis) [Z86.718]  Atrial fibrillation  unspecified type (H) (Resolved) [I48.91]             Anticoagulation Episode Summary     INR check location:      Preferred lab:  EXTERNAL LAB    Send INR reminders to:  CHELSEA CHILEL    Comments:  PEPP[employer] rodas to manage by exception      Anticoagulation Care Providers     Provider Role Specialty Phone number    Anya Nowak MD Referring Family Medicine [phone]

## 2021-01-12 DIAGNOSIS — Z94.81 STATUS POST BONE MARROW TRANSPLANT (H): Primary | ICD-10-CM

## 2021-01-12 DIAGNOSIS — N18.2 TYPE 2 DIABETES MELLITUS WITH STAGE 2 CHRONIC KIDNEY DISEASE, WITHOUT LONG-TERM CURRENT USE OF INSULIN (H): ICD-10-CM

## 2021-01-12 DIAGNOSIS — E83.110 HEREDITARY HEMOCHROMATOSIS (H): ICD-10-CM

## 2021-01-12 DIAGNOSIS — E78.5 HYPERLIPIDEMIA WITH TARGET LDL LESS THAN 100: ICD-10-CM

## 2021-01-12 DIAGNOSIS — E11.22 TYPE 2 DIABETES MELLITUS WITH STAGE 2 CHRONIC KIDNEY DISEASE, WITHOUT LONG-TERM CURRENT USE OF INSULIN (H): ICD-10-CM

## 2021-01-12 DIAGNOSIS — Z85.71 HISTORY OF HODGKIN'S LYMPHOMA: ICD-10-CM

## 2021-01-14 ENCOUNTER — HOSPITAL ENCOUNTER (OUTPATIENT)
Dept: PHYSICAL THERAPY | Facility: CLINIC | Age: 71
Setting detail: THERAPIES SERIES
End: 2021-01-14
Attending: OTOLARYNGOLOGY
Payer: COMMERCIAL

## 2021-01-14 PROCEDURE — 97110 THERAPEUTIC EXERCISES: CPT | Mod: GP,GT | Performed by: PHYSICAL THERAPIST

## 2021-01-14 NOTE — PROGRESS NOTES
Haresh Rock is a 70 year old male who is being seen via a billable video visit.      Patient has given verbal consent for Video visit? Yes    Video Start Time: 116pm    Telehealth Visit Details    Type of Service:  Telehealth    Video End Time (time video stopped): 134    Originating Location (pt. location): Home    Additional Participants in Telehealth Visit: no    Distant Location (provider location):  [facility]     Mode of Communication (Audio Visual or Audio Only):  both    Claire Gregg, PT  January 14, 2021

## 2021-01-15 ENCOUNTER — HEALTH MAINTENANCE LETTER (OUTPATIENT)
Age: 71
End: 2021-01-15

## 2021-01-15 DIAGNOSIS — Z85.71 HISTORY OF HODGKIN'S LYMPHOMA: ICD-10-CM

## 2021-01-15 DIAGNOSIS — E78.5 HYPERLIPIDEMIA WITH TARGET LDL LESS THAN 100: ICD-10-CM

## 2021-01-15 DIAGNOSIS — N18.2 TYPE 2 DIABETES MELLITUS WITH STAGE 2 CHRONIC KIDNEY DISEASE, WITHOUT LONG-TERM CURRENT USE OF INSULIN (H): ICD-10-CM

## 2021-01-15 DIAGNOSIS — E11.22 TYPE 2 DIABETES MELLITUS WITH STAGE 2 CHRONIC KIDNEY DISEASE, WITHOUT LONG-TERM CURRENT USE OF INSULIN (H): ICD-10-CM

## 2021-01-15 DIAGNOSIS — E83.110 HEREDITARY HEMOCHROMATOSIS (H): ICD-10-CM

## 2021-01-15 DIAGNOSIS — Z94.81 STATUS POST BONE MARROW TRANSPLANT (H): ICD-10-CM

## 2021-01-15 LAB
ALBUMIN SERPL-MCNC: 3.6 G/DL (ref 3.4–5)
ALP SERPL-CCNC: 132 U/L (ref 40–150)
ALT SERPL W P-5'-P-CCNC: 19 U/L (ref 0–70)
ANION GAP SERPL CALCULATED.3IONS-SCNC: 3 MMOL/L (ref 3–14)
AST SERPL W P-5'-P-CCNC: 20 U/L (ref 0–45)
BASOPHILS # BLD AUTO: 0 10E9/L (ref 0–0.2)
BASOPHILS NFR BLD AUTO: 0.2 %
BILIRUB SERPL-MCNC: 1 MG/DL (ref 0.2–1.3)
BUN SERPL-MCNC: 32 MG/DL (ref 7–30)
CALCIUM SERPL-MCNC: 8.9 MG/DL (ref 8.5–10.1)
CHLORIDE SERPL-SCNC: 105 MMOL/L (ref 94–109)
CHOLEST SERPL-MCNC: 110 MG/DL
CO2 SERPL-SCNC: 31 MMOL/L (ref 20–32)
CREAT SERPL-MCNC: 1.21 MG/DL (ref 0.66–1.25)
CRP SERPL-MCNC: 13.9 MG/L (ref 0–8)
DIFFERENTIAL METHOD BLD: ABNORMAL
EOSINOPHIL # BLD AUTO: 0.1 10E9/L (ref 0–0.7)
EOSINOPHIL NFR BLD AUTO: 0.7 %
ERYTHROCYTE [DISTWIDTH] IN BLOOD BY AUTOMATED COUNT: 13.7 % (ref 10–15)
FERRITIN SERPL-MCNC: 197 NG/ML (ref 26–388)
GFR SERPL CREATININE-BSD FRML MDRD: 59 ML/MIN/{1.73_M2}
GLUCOSE SERPL-MCNC: 111 MG/DL (ref 70–99)
HBA1C MFR BLD: 5.5 % (ref 0–5.6)
HCT VFR BLD AUTO: 44.7 % (ref 40–53)
HDLC SERPL-MCNC: 36 MG/DL
HGB BLD-MCNC: 15.8 G/DL (ref 13.3–17.7)
INR PPP: 3.2 (ref 0.86–1.14)
IRON SATN MFR SERPL: 58 % (ref 15–46)
IRON SERPL-MCNC: 129 UG/DL (ref 35–180)
LDLC SERPL CALC-MCNC: 55 MG/DL
LYMPHOCYTES # BLD AUTO: 3.2 10E9/L (ref 0.8–5.3)
LYMPHOCYTES NFR BLD AUTO: 25.3 %
MCH RBC QN AUTO: 31.7 PG (ref 26.5–33)
MCHC RBC AUTO-ENTMCNC: 35.3 G/DL (ref 31.5–36.5)
MCV RBC AUTO: 90 FL (ref 78–100)
MONOCYTES # BLD AUTO: 1.3 10E9/L (ref 0–1.3)
MONOCYTES NFR BLD AUTO: 9.9 %
NEUTROPHILS # BLD AUTO: 8 10E9/L (ref 1.6–8.3)
NEUTROPHILS NFR BLD AUTO: 63.9 %
NONHDLC SERPL-MCNC: 74 MG/DL
PLATELET # BLD AUTO: 142 10E9/L (ref 150–450)
POTASSIUM SERPL-SCNC: 4 MMOL/L (ref 3.4–5.3)
PROT SERPL-MCNC: 7.5 G/DL (ref 6.8–8.8)
RBC # BLD AUTO: 4.98 10E12/L (ref 4.4–5.9)
SODIUM SERPL-SCNC: 139 MMOL/L (ref 133–144)
TIBC SERPL-MCNC: 221 UG/DL (ref 240–430)
TRIGL SERPL-MCNC: 96 MG/DL
WBC # BLD AUTO: 12.6 10E9/L (ref 4–11)

## 2021-01-15 PROCEDURE — 85610 PROTHROMBIN TIME: CPT | Performed by: INTERNAL MEDICINE

## 2021-01-15 PROCEDURE — 82728 ASSAY OF FERRITIN: CPT | Performed by: INTERNAL MEDICINE

## 2021-01-15 PROCEDURE — 86140 C-REACTIVE PROTEIN: CPT | Performed by: INTERNAL MEDICINE

## 2021-01-15 PROCEDURE — 83550 IRON BINDING TEST: CPT | Performed by: INTERNAL MEDICINE

## 2021-01-15 PROCEDURE — 80061 LIPID PANEL: CPT | Performed by: INTERNAL MEDICINE

## 2021-01-15 PROCEDURE — 83540 ASSAY OF IRON: CPT | Performed by: INTERNAL MEDICINE

## 2021-01-15 PROCEDURE — 36415 COLL VENOUS BLD VENIPUNCTURE: CPT | Performed by: INTERNAL MEDICINE

## 2021-01-15 PROCEDURE — 80053 COMPREHEN METABOLIC PANEL: CPT | Performed by: INTERNAL MEDICINE

## 2021-01-15 PROCEDURE — 83036 HEMOGLOBIN GLYCOSYLATED A1C: CPT | Performed by: INTERNAL MEDICINE

## 2021-01-15 PROCEDURE — 85025 COMPLETE CBC W/AUTO DIFF WBC: CPT | Performed by: INTERNAL MEDICINE

## 2021-01-19 ENCOUNTER — VIRTUAL VISIT (OUTPATIENT)
Dept: TRANSPLANT | Facility: CLINIC | Age: 71
End: 2021-01-19
Attending: INTERNAL MEDICINE
Payer: COMMERCIAL

## 2021-01-19 DIAGNOSIS — N18.31 STAGE 3A CHRONIC KIDNEY DISEASE (H): ICD-10-CM

## 2021-01-19 DIAGNOSIS — D89.811 GRAFT-VERSUS-HOST DISEASE, CHRONIC (H): ICD-10-CM

## 2021-01-19 DIAGNOSIS — E83.110 HEREDITARY HEMOCHROMATOSIS (H): Primary | ICD-10-CM

## 2021-01-19 DIAGNOSIS — Z79.01 LONG TERM CURRENT USE OF ANTICOAGULANT THERAPY: ICD-10-CM

## 2021-01-19 DIAGNOSIS — C81.99 HODGKIN'S DISEASE OF EXTRANODAL OR SOLID ORGAN SITE (H): ICD-10-CM

## 2021-01-19 PROBLEM — N18.30 CHRONIC KIDNEY DISEASE, STAGE 3 (H): Status: ACTIVE | Noted: 2021-01-19

## 2021-01-19 PROCEDURE — 99214 OFFICE O/P EST MOD 30 MIN: CPT | Mod: 95 | Performed by: INTERNAL MEDICINE

## 2021-01-19 NOTE — LETTER
1/19/2021         RE: Haresh Rock  [address] [place] [address]        Dear Colleague,    Thank you for referring your patient, Haresh Rock, to the [facility]LOOD [facility] TRANSPLANT PROGRAM [place]. Please see a copy of my visit note below.    Haresh is a 70 year old who is being evaluated via a billable video visit.      How would you like to obtain your AVS? MyChart  If the video visit is dropped, the invitation should be resent by: Send to e-mail at: [email or web address]  Will anyone else be joining your video visit? No      Vitals - Patient Reported  Systolic (Patient Reported): 132  Diastolic (Patient Reported): 79  Weight (Patient Reported): 76.7 kg (169 lb)  Height (Patient Reported): 182.9 cm (6')  BMI (Based on Pt Reported Ht/Wt): 22.92  SpO2 (Patient Reported): 97  Pain Score: No Pain (0)    Marci Cisneros [facility]      BONE MARROW TRANSPLANT VISIT      Haresh returns to the [facility] for a video reevaluation of an atypical myeloproliferative syndrome, status post nonmyeloablative allo-sib peripheral blood stem cell transplant, a history of chronic gpnes-adxesq-unoa disease, a history of cirrhosis of the liver, history of hemochromatosis with C282Y homozygosity, a history of pulmonary emboli, a history of cardiac arrhythmias with an ablation and a history of Hodgkin's disease.  When I saw Haresh back in July, he was having trouble swallowing.  He had a video esophagram done and evaluation by speech therapy.  He was found to have difficulty swallowing with aspiration.  There was a question of whether or not he had Parkinson's.  He was seen by Dr. Ángel Hill.  He was found to have a bit of a shuffling gait.  He has no tremor though.  He has noticed that his handwriting has gotten smaller.  He did have a dopaminergic brain scan which showed a decrease in dopaminergic staining consistent with early Parkinson's.  He has not been given any treatment.  He has been eating  regular food, but he avoids certain foods at that may stick in his upper esophagus.  He has no trouble with jello or applesauce.  He states if he chews chicken or fish he can swallow that well.  He has lost 40 pounds.  He is quite concerned about the weight loss and is wondering whether or not there might be another cause for the weight loss.  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   PAST MEDICAL HISTORY:  See my note from 07/2020    SOCIAL HISTORY:  See my note from 07/2020     FAMILY HISTORY:  See my note from  07/2020     REVIEW OF SYSTEMS:  A 12-point review of systems is done and is negative, except as in the HPI.   PHYSICAL EXAMINATION:  By the video, he is an alert gentleman, verbal, in no acute distress.   He looks gaunt with weight loss  EYES:  Grossly normal to inspection, no discharge, erythema or icterus.   SKIN:  Visible skin is clear.  No significant rash or abnormal pigmentation.   NEUROLOGIC:  Cranial nerves seem to be intact.  Mentation and speech is appropriate for age.   PSYCHIATRIC:  Mentation appears normal.  He does not seem anxious today.     ASSESSMENT:     1.  Myeloproliferative syndrome/MDS. JAL 2 positive  2.  Status post non-myeloablative allo-sib peripheral blood stem cell transplant.   3.  History of chronic qtamj-clwewh-bvxh disease.   4.  History of Hodgkin's disease.   5.  History of cardiac arrhythmias, SVT and V tach.   6.  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   7.  History of cirrhosis.   8.  History of pulmonary emboli.   9.  History of elevated hemoglobin A1c.   10.  Thyroid nodule.    11.  Cholelithiasis.     12.  Diverticulosis.   13.  Anticoagulation. VIDEO VISIT    14.  Status post bilateral knee replacement.     15.  Aortic stenosis  16. 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  17. Seborrheic keratosis of the back.  18 Weight loss  19.Dysphagia  20. Parkinson'  21.Weight loss      ASSESSMENT:  It is really tragic with Haresh developing Parkinson's.  I suspect that with the difficulty swallowing and now with the other symptoms including handwriting and a shuffling gait, I am just not sure whether or not he needs any treatment.  He will be followed by Dr. Ángel Hill.  I would like to be sure that there is not any other cause for his weight loss.  He has had chronic wtilo-nluzko-vptv disease.  He has had cirrhosis.  He has had problems with Hodgkin's disease.  A CT scan of the chest, abdomen and pelvis last fall did not show any lymphadenopathy.  He does not have any fevers or sweats or other processes that might be considered causative of the weight loss.  He does not have any diarrhea.  I said to Haresh and his wife, Angelica, that I would like to see him and examine in person in a month.  His iron saturation was up a little bit and his ferritin is up a little bit, but I do not want to do a phlebotomy at this time.  I will see him again in a month and I will discuss this further with Dr. Hill.     RTC in 1 months with labs      Augusto Miller MD   [phone]        Video Start Time: 1025  Video-Visit Details    Type of service:  Video Visit    Video End Time:1050    Originating Location (pt. Location): home    Distant Location (provider location):  [facility] BLOOD AND MARROW TRANSPLANT PRO[facility] [place]     Platform used for Video Visit: WELL      Again, thank you for allowing me to participate in the care of your patient.        Sincerely,        Augusto Miller MD

## 2021-01-19 NOTE — PROGRESS NOTES
Haresh is a 70 year old who is being evaluated via a billable video visit.      How would you like to obtain your AVS? MyChart  If the video visit is dropped, the invitation should be resent by: Send to e-mail at: [email or web address]  Will anyone else be joining your video visit? No      Vitals - Patient Reported  Systolic (Patient Reported): 132  Diastolic (Patient Reported): 79  Weight (Patient Reported): 76.7 kg (169 lb)  Height (Patient Reported): 182.9 cm (6')  BMI (Based on Pt Reported Ht/Wt): 22.92  SpO2 (Patient Reported): 97  Pain Score: No Pain (0)    [facility]      B[facility] TR[facility]PL[facility] VISIT      Haresh [facility] for a video reevaluation of an atypical myeloproliferative syndrome, status post nonmyeloablative allo-sib peripheral blood stem cell transplant, a history of chronic wlwtq-uhcnsu-oycv disease, a history of cirrhosis of the liver, history of hemochromatosis with C282Y homozygosity, a history of pulmonary emboli, a history of cardiac arrhythmias with an ablation and a history of Hodgkin's disease.  When I saw Haresh back in July, he was having trouble swallowing.  He had a video esophagram done and evaluation by speech therapy.  He was found to have difficulty swallowing with aspiration.  There was a question of whether or not he had Parkinson's.  He was seen by Dr. Ángel Hill.  He was found to have a bit of a shuffling gait.  He has no tremor though.  He has noticed that his handwriting has gotten smaller.  He did have a dopaminergic brain scan which showed a decrease in dopaminergic staining consistent with early Parkinson's.  He has not been given any treatment.  He has been eating regular food, but he avoids certain foods at that may stick in his upper esophagus.  He has no trouble with jello or applesauce.  He states if he chews chicken or fish he can swallow that well.  He has lost 40 pounds.  He is quite concerned about the weight loss and is wondering whether  or not there might be another cause for the weight loss.  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   PAST MEDICAL HISTORY:  See my note from 07/2020    SOCIAL HISTORY:  See my note from 07/2020     FAMILY HISTORY:  See my note from  07/2020     REVIEW OF SYSTEMS:  A 12-point review of systems is done and is negative, except as in the HPI.   PHYSICAL EXAMINATION:  By the video, he is an alert gentleman, verbal, in no acute distress.   He looks gaunt with weight loss  EYES:  Grossly normal to inspection, no discharge, erythema or icterus.   SKIN:  Visible skin is clear.  No significant rash or abnormal pigmentation.   NEUROLOGIC:  Cranial nerves seem to be intact.  Mentation and speech is appropriate for age.   PSYCHIATRIC:  Mentation appears normal.  He does not seem anxious today.     ASSESSMENT:     1.  Myeloproliferative syndrome/MDS. JAL 2 positive  2.  Status post non-myeloablative allo-sib peripheral blood stem cell transplant.   3.  History of chronic dfaoi-qfnwgn-hgmn disease.   4.  History of Hodgkin's disease.   5.  History of cardiac arrhythmias, SVT and V tach.   6.  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   7.  History of cirrhosis.   8.  History of pulmonary emboli.   9.  History of elevated hemoglobin A1c.   10.  Thyroid nodule.    11.  Cholelithiasis.     12.  Diverticulosis.   13.  Anticoagulation. VIDEO VISIT    14.  Status post bilateral knee replacement.     15.  Aortic stenosis  16. 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  17. Seborrheic keratosis of the back.  18 Weight loss  19.Dysphagia  20. Parkinson'  21.Weight loss     ASSESSMENT:  It is really tragic with Haresh developing Parkinson's.  I suspect that with the difficulty swallowing and now with the other symptoms including handwriting and a shuffling gait, I am just not sure whether or not he needs any treatment.  He will be followed by Dr. Ángel Hill.  I  would like to be sure that there is not any other cause for his weight loss.  He has had chronic etkhf-xdpkny-mxva disease.  He has had cirrhosis.  He has had problems with Hodgkin's disease.  A CT scan of the chest, abdomen and pelvis last fall did not show any lymphadenopathy.  He does not have any fevers or sweats or other processes that might be considered causative of the weight loss.  He does not have any diarrhea.  I said to Haresh and his wife, Angelica, that I would like to see him and examine in person in a month.  His iron saturation was up a little bit and his ferritin is up a little bit, but I do not want to do a phlebotomy at this time.  I will see him again in a month and I will discuss this further with Dr. Hill.     RTC in 1 months with labs      Augusto Miller MD   [phone]            Video Start Time: 1025  Video-Visit Details    Type of service:  Video Visit    Video End Time:1050    Originating Location (pt. Location): home    Distant Location (provider location):  [facility]     Platform used for Video Visit: [employer]

## 2021-01-20 DIAGNOSIS — I47.10 PAROXYSMAL SUPRAVENTRICULAR TACHYCARDIA (H): ICD-10-CM

## 2021-01-20 DIAGNOSIS — I48.0 PAROXYSMAL ATRIAL FIBRILLATION (H): ICD-10-CM

## 2021-01-21 RX ORDER — FLECAINIDE ACETATE 50 MG/1
50 TABLET ORAL 2 TIMES DAILY
Qty: 180 TABLET | Refills: 2 | Status: SHIPPED | OUTPATIENT
Start: 2021-01-21 | End: 2021-10-12

## 2021-01-21 NOTE — TELEPHONE ENCOUNTER
Signed Prescriptions:                        Disp   Refills    flecainide (TAMBOCOR) 50 MG tablet         180 ta*2        Sig: Take 1 tablet (50 mg) by mouth 2 times daily  Authorizing Provider: JOEL MATA  Ordering User: TIFFANY MCKEON    Rx filled per refill protocol.  Tiffany Mckeon RN

## 2021-01-22 ENCOUNTER — DOCUMENTATION ONLY (OUTPATIENT)
Dept: FAMILY MEDICINE | Facility: CLINIC | Age: 71
End: 2021-01-22

## 2021-01-22 ENCOUNTER — TRANSFERRED RECORDS (OUTPATIENT)
Dept: HEALTH INFORMATION MANAGEMENT | Facility: CLINIC | Age: 71
End: 2021-01-22

## 2021-01-22 ENCOUNTER — ANTICOAGULATION THERAPY VISIT (OUTPATIENT)
Dept: FAMILY MEDICINE | Facility: CLINIC | Age: 71
End: 2021-01-22

## 2021-01-22 DIAGNOSIS — Z79.01 LONG TERM CURRENT USE OF ANTICOAGULANT THERAPY: ICD-10-CM

## 2021-01-22 DIAGNOSIS — D68.61 ANTIPHOSPHOLIPID ANTIBODY SYNDROME (H): ICD-10-CM

## 2021-01-22 DIAGNOSIS — Z86.718 PERSONAL HISTORY OF DVT (DEEP VEIN THROMBOSIS): ICD-10-CM

## 2021-01-22 DIAGNOSIS — I48.91 ATRIAL FIBRILLATION (H): ICD-10-CM

## 2021-01-22 LAB — INR PPP: 2.1 (ref 0.9–1.1)

## 2021-01-22 NOTE — PROGRESS NOTES
ANTICOAGULATION MANAGEMENT     Patient Name:  Haresh Rock  Date:  2021    ASSESSMENT /SUBJECTIVE:    Today's INR result of 2.1 is therapeutic. Goal INR of 2.0-3.0      Warfarin dose taken: Warfarin taken as instructed    Diet: No new diet changes affecting INR    Medication changes/ interactions: No new medications/supplements affecting INR    Previous INR: Therapeutic     S/S of bleeding or thromboembolism: No    New injury or illness: No    Upcoming surgery, procedure or cardioversion: No    Additional findings: None      PLAN:    Telephone call with Haresh regarding INR result and instructed:     Warfarin Dosing Instructions: Continue your current warfarin dose 1.25 mg MTH; 2.5 ROW    Instructed patient to follow up no later than: 1 week  Patient to recheck with home meter    Education provided: None required      Haresh verbalizes understanding and agrees to warfarin dosing plan.    Instructed to call the Anticoagulation Clinic for any changes, questions or concerns. (#[phone])        Joellen Molina RN      OBJECTIVE:  Recent labs: (last 7 days)     21   INR 2.1*         INR assessment THER    Recheck INR In: 1 WEEK    INR Location Home INR      Anticoagulation Summary  As of 2021    INR goal:  2.0-3.0   TTR:  76.8 % (1 y)   INR used for dosin.1 (2021)   Warfarin maintenance plan:  1.25 mg (2.5 mg x 0.5) every Mon, Thu; 2.5 mg (2.5 mg x 1) all other days   Full warfarin instructions:  1.25 mg every Mon, Thu; 2.5 mg all other days   Weekly warfarin total:  15 mg   Plan last modified:  Hari Saavedra RN (2020)   Next INR check:  2021   Priority:  Maintenance   Target end date:  Indefinite    Indications    Long-term (current) use of anticoagulants [Z79.01] [Z79.01]  Atrial fibrillation (H) [I48.91]  Antiphospholipid antibody syndrome (H) [D68.61]  Personal history of DVT (deep vein thrombosis) [Z86.718]  Atrial fibrillation  unspecified type (H) (Resolved)  [I48.91]             Anticoagulation Episode Summary     INR check location:      Preferred lab:  [facility]    Send INR reminders to:  CHELSEA HCILEL    Comments:  JESSICA okay to manage by exception      Anticoagulation Care Providers     Provider Role Specialty Phone number    Anya Nowak MD Referring Family Medicine [phone]

## 2021-01-22 NOTE — PROGRESS NOTES
ANTICOAGULATION     Haresh Rock is overdue for INR check.      Mychart sent    Blanca Rodriguez RN

## 2021-01-25 ENCOUNTER — HOSPITAL ENCOUNTER (OUTPATIENT)
Dept: SPEECH THERAPY | Facility: CLINIC | Age: 71
Setting detail: THERAPIES SERIES
End: 2021-01-25
Attending: OTOLARYNGOLOGY
Payer: COMMERCIAL

## 2021-01-25 PROCEDURE — 92526 ORAL FUNCTION THERAPY: CPT | Mod: GN,GT | Performed by: SPEECH-LANGUAGE PATHOLOGIST

## 2021-01-25 NOTE — PROGRESS NOTES
Haresh Rock is a 70 year old male who is being seen via a billable video visit.      Patient has given verbal consent for Video visit? Yes    Video Start Time: 10:58    Telehealth Visit Details    Type of Service:  Telehealth    Video End Time (time video stopped): 11:45 am    Originating Location (pt. location): Home    Additional Participants in Telehealth Visit: None     Distant Location (provider location):  [facility]     Mode of Communication (Audio Visual or Audio Only):  Audio visual     Linda Anthony, SLP  January 25, 2021

## 2021-01-27 ENCOUNTER — HOSPITAL ENCOUNTER (OUTPATIENT)
Dept: SPEECH THERAPY | Facility: CLINIC | Age: 71
Setting detail: THERAPIES SERIES
End: 2021-01-27
Attending: OTOLARYNGOLOGY
Payer: COMMERCIAL

## 2021-01-27 PROCEDURE — 92526 ORAL FUNCTION THERAPY: CPT | Mod: GN,95 | Performed by: SPEECH-LANGUAGE PATHOLOGIST

## 2021-01-29 ENCOUNTER — ANTICOAGULATION THERAPY VISIT (OUTPATIENT)
Dept: FAMILY MEDICINE | Facility: CLINIC | Age: 71
End: 2021-01-29

## 2021-01-29 ENCOUNTER — TRANSFERRED RECORDS (OUTPATIENT)
Dept: HEALTH INFORMATION MANAGEMENT | Facility: CLINIC | Age: 71
End: 2021-01-29

## 2021-01-29 DIAGNOSIS — I48.91 ATRIAL FIBRILLATION (H): ICD-10-CM

## 2021-01-29 DIAGNOSIS — Z79.01 LONG TERM CURRENT USE OF ANTICOAGULANT THERAPY: ICD-10-CM

## 2021-01-29 DIAGNOSIS — Z86.718 PERSONAL HISTORY OF DVT (DEEP VEIN THROMBOSIS): ICD-10-CM

## 2021-01-29 DIAGNOSIS — D68.61 ANTIPHOSPHOLIPID ANTIBODY SYNDROME (H): ICD-10-CM

## 2021-01-29 LAB — INR PPP: 2.2 (ref 0.9–1.1)

## 2021-01-29 NOTE — PROGRESS NOTES
Haresh Rock is a 70 year old male who is being seen via a billable video visit.      Patient has given verbal consent for Video visit? Yes    Video Start Time: 11:07 am     Telehealth Visit Details    Type of Service:  Telehealth    Video End Time (time video stopped): 11:47 am    Originating Location (pt. location): Home    Additional Participants in Telehealth Visit: None     Distant Location (provider location):  [facility]     Mode of Communication (Audio Visual or Audio Only):  Audio Visual     Linda Anthony, SLP  January 29, 2021

## 2021-01-29 NOTE — PROGRESS NOTES
ANTICOAGULATION  MANAGEMENT-Patient Home Monitoring Result    Assessment     Therapeutic INR result of 2.2 . Goal range 2.0-3.0. Received via fax from [employer] home INR monitoring company.        Previous INR was therapeutic    Haresh was last contacted by phone: 2021    Plan     Per home monitoring agreement with patient, patient was NOT contacted regarding therapeutic result today.  Patient is to continue current dose and continue to check INR with home monitor per protocol.  ?       OBJECTIVE    INR   Date Value Ref Range Status   2021 2.2 (A) 0.90 - 1.10 Final     Factor 2 Assay   Date Value Ref Range Status   2007 58 (L) 60 - 140 % Final     Comment:     (Note)  CALLED TO TAMARA(INTERV. RADIOLOGY)[id number],        ASSESSMENT / PLAN  No question data found.  Anticoagulation Summary  As of 2021    INR goal:  2.0-3.0   TTR:  76.8 % (1 y)   INR used for dosin.2 (2021)   Warfarin maintenance plan:  1.25 mg (2.5 mg x 0.5) every Mon, Thu; 2.5 mg (2.5 mg x 1) all other days   Full warfarin instructions:  1.25 mg every Mon, Thu; 2.5 mg all other days   Weekly warfarin total:  15 mg   No change documented:  Macrina Preston RN   Plan last modified:  Hari Saavedra RN (2020)   Next INR check:  2021   Priority:  Maintenance   Target end date:  Indefinite    Indications    Long-term (current) use of anticoagulants [Z79.01] [Z79.01]  Atrial fibrillation (H) [I48.91]  Antiphospholipid antibody syndrome (H) [D68.61]  Personal history of DVT (deep vein thrombosis) [Z86.718]  Atrial fibrillation  unspecified type (H) (Resolved) [I48.91]             Anticoagulation Episode Summary     INR check location:      Preferred lab:  [facility]    Send INR reminders to:  CHELSEA CHILEL    Comments:  JESSICA rodas to manage by exception      Anticoagulation Care Providers     Provider Role Specialty Phone number    Anya Nowak MD Referring Family Medicine [phone]          Macrina  Austin RN, BSN, PHN

## 2021-02-01 ENCOUNTER — HOSPITAL ENCOUNTER (OUTPATIENT)
Dept: SPEECH THERAPY | Facility: CLINIC | Age: 71
Setting detail: THERAPIES SERIES
End: 2021-02-01
Attending: OTOLARYNGOLOGY
Payer: COMMERCIAL

## 2021-02-01 PROCEDURE — 92526 ORAL FUNCTION THERAPY: CPT | Mod: GN,GT | Performed by: SPEECH-LANGUAGE PATHOLOGIST

## 2021-02-01 NOTE — PROGRESS NOTES
Haresh Rock is a 70 year old male who is being seen via a billable video visit.      Patient has given verbal consent for Video visit? Yes    Video Start Time: 11:01 am     Telehealth Visit Details    Type of Service:  Telehealth    Video End Time (time video stopped): 11:51 am    Originating Location (pt. location): Home    Additional Participants in Telehealth Visit: None     Distant Location (provider location):  [facility]     Mode of Communication (Audio Visual or Audio Only):  Audio Visual     Linda Anthony, SLP  February 1, 2021

## 2021-02-02 ENCOUNTER — DOCUMENTATION ONLY (OUTPATIENT)
Dept: CARE COORDINATION | Facility: CLINIC | Age: 71
End: 2021-02-02

## 2021-02-02 NOTE — PROGRESS NOTES
Diagnosis/Summary/Recommendations:    Video visit using his iphone.     PATIENT: Haresh Rock  70 year old male     : 1950    KEN: 2021    [address] [place] [id number] [phone] (M)  [phone] (H)  [email or web address]  Has mychart  proxy - sheyla Ramirez - son  Gemma - daughter  Sheyla Rock  [phone]    [email or web address],   [email or web address]    Assessment:  Using iphone for video visit today with software    (G20) Parkinson disease (H)  (primary encounter diagnosis)  Dopamine scan - (datscan)  11/3/2020  A presynaptic dopaminergic deficit is demonstrated bilaterally.    CARMITA Anthony, SLP   WINTER Gregg, PT    Gait/Balance/Falls-- no falls  No more PT - doing well    Had chills and tremors and resolved   No fever - 2 or 3 hours  Breathing was fine.     Exercise/Therapy    Cognitive/Driving    Mood - denies -     Bored. Waiting for vaccination  Signed up for the  project  [employer] [facility]    Daughter with bipolar  Son with mood/ocd/eating    Hallucinations/delusions     Sleep  Pseudoeph-doxylamine DM APAP nyquil    Bladder  Renal function.   May have stage 2 disease.      Prostate - denies problems. He has has nocturia couple times per night.   Denies elevated psa    GI/Constipation/GERD  Swallow study 2020  1. Multiple events of deep laryngeal penetration with thinner barium consistencies with intermittent aspiration.     Gallstones but has not gallbladder removed.  Had a gallbladder attack x 1 and is not on actigall    Calcium carbonate tums 500mg  Nutritional supplement - 1 bottle per day   Pantoprazole protonix 20mg - not using  Polythyelene glycol miralax - encouraged to use   Prevident 5000 dry mouth - using  Senna-docusate senexon-S senokot-S 2 tabs per day     ENDO  Cholesterol  simvastatin 10mg/day  Had diabetes in the past from prednisone  And this may have resolved  May have a thyroid nodule.  TSH was normal.     Cardio/heart  Flecainide  tambocor 50mg  Metoprolol tartrate lopressor 25mg    Vision    Heme  Anticoagulated  warfarin     Hereditary hemochromatosis gene - gene that increases his risk and has not had problems with this. Had had phlebotomies x 2 in the past.     AYUSH Torres     History of pulmonary embolii - back in 2004. This was related to blood clotting problems.   May have had a DVT. Reportedly has had antiphospholipid antibody. He is on coumadin.      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 Had a transplant from Dr. 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  No problems since then.     Other:    Hodgkin's lymphoma in the past 2011 and had chemotherapy for this and followed with CT scan and reportedly this is gone.      Pulmonary hypertension in the past.     msk - bilateral knee replacements.     Eye - left eye has been poor since age 2 due to a lazy eye - amblyopia  Right eye post cataract surgery had an artificial lens put in and does not wear glasses till the evening      Hearing. Feels he has wax in his left ear and partial problem with the right ear. It is variable problem. Has used ear wax removal.      He reports a neuropathy     Medications                 Acetaminophen tylenol 500mg As needed            Amoxicillin amoxil 500mg For dentist           Calcium carbonate tums 500mg As needed           Flecainide tambocor 50mg 1     1     Folic acid folvite 1mg 1           Fluticasone flonase 50 mcg/act nasal spray Not using       Loratadine claritin 10mg  Not taking       Metoprolol tartrate lopressor 25mg 1     1     MVI centrum variable           Nutritional supplement - ensure 1 bottle       Pantoprazole protonix 20mg Not taking         Polythyelene glycol  miralax encouraged to use       Prevident 5000 dry mouth using           Pseudoeph-doxylamine DM APAP nyquil Not taking usually           Senna-docusate 8.6-50 2       Simvastatin zocor 10mg       1     Warfarin  anticoagulant coumadin 2.5mg 5 days - 1 tab; 1/2 tab m/f                              Plan:  No change in regimen    Discussed KN95 mask and fabric    https://[place]vaccine/find-vaccine/index.jsp  Discussed vaccination.   Tried to help him on scheduling for vaccination    Coding statement:   Medical Decision Making:  #  Chronic progressive medical conditions addressed   - discussed parkinson and other   Review and/or interpretation of unique test or documentation from a provider outside of neurology  - no   Independent historian provided additional details  Yes  - spouse  Prescription drug management and review of potential side effects and/or monitoring for side effects   -discussed   Health impacted by social determinants of health  No     I have reviewed the note as documented above.  This accurately captures the substance of my conversation with the patient and total time spent preparing for visit, executing visit and completing visit on the day of the visit:  30 minutes.      Ángel Hill MD     ______________________________________    Last visit date and details:             ______________________________________      Patient was asked about 14 Review of systems including changes in vision (dry eyes, double vision), hearing, heart, lungs, musculoskeletal, depression, anxiety, snoring, RBD, insomnia, urinary frequency, urinary urgency, constipation, swallowing problems, hematological, ID, allergies, skin problems: seborrhea, endocrinological: thyroid, diabetes, cholesterol; balance, weight changes, and other neurological problems and these were not significant at this time except for   Patient Active Problem List   Diagnosis     Hemochromatosis     Antiphospholipid antibody syndrome (H)     Ffmgq-fjloqe-cogs disease, chronic (H)     Advanced directives, counseling/discussion     Polyneuropathy     Status post total knee replacements     Status post bone marrow transplant (H)     Hyperlipidemia with  target LDL less than 100     Atrial fibrillation (H)     Personal history of DVT (deep vein thrombosis)     Chronic rhinitis     Gallstones without obstruction of gallbladder     Long-term (current) use of anticoagulants [Z79.01]     Thyroid nodule     Type 2 diabetes mellitus with stage 2 chronic kidney disease (H)     Renal hypertension     History of Hodgkin's lymphoma     History of irradiation, presenting hazards to health     Primary pulmonary hypertension (H)     Hereditary hemochromatosis (H)     Oropharyngeal dysphagia     Articulation disorder     Cardiac dysrhythmia     Disease of blood and blood forming organ     Personal history of PE (pulmonary embolism)     Complications of bone marrow transplant (H)     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)     Renal failure, chronic     Diabetes mellitus type 1 (H)     Hypotension     Essential hypertension     Hyperlipidemia     PAF (paroxysmal atrial fibrillation) (H)     Total knee replacement status     Parkinson disease (H)     Abnormal dopamine scan (DaTSCAN) 2020     Chronic kidney disease, stage 3          Allergies   Allergen Reactions     No Known Drug Allergy      Past Surgical History:   Procedure Laterality Date     ANESTHESIA CARDIOVERSION  4/21/2011    Procedure:ANESTHESIA CARDIOVERSION; Surgeon:GENERIC ANESTHESIA PROVIDER; Location:[facility] OR     ARTHROSCOPY KNEE RT/LT      LT     CATARACT IOL, RT/LT  2017     COLONOSCOPY  10/16/2012    Procedure: COLONOSCOPY;  Colonoscopy, screening;  Surgeon: Reg Feliciano MD;  Location: [facility] OR     ESOPHAGOSCOPY, GASTROSCOPY, DUODENOSCOPY (EGD), COMBINED N/A 10/7/2020    Procedure: ESOPHAGOGASTRODUODENOSCOPY, WITH BIOPSY;  Surgeon: Raul Sawyer DO;  Location: [facility] OR     H ABLATION FOCAL AFIB  3/5/2013    PVI     H ABLATION FOCAL AFIB  01/01/2018     HC BONE MARROW/STEM TRANSPLANT ALLOGENIC  5/8/2007     INSERT PORT VASCULAR ACCESS       JOINT REPLACEMTN, KNEE RT/LT  3/28/12    SHAWN     VASECTOMY  1990     Past  Medical History:   Diagnosis Date     Abnormal dopamine scan (DaTSCAN) 2020 11/04/2020    [phone] 11/3/2020   Narrative & Impression   Examination: Nuclear medicine DATscan for Dopamine Receptor Localization.   Examination: NM Brain Image Tomographic (SPECT) DATscan   Date: 11/3/2020 3:21 PM   Indication: Parkinsonism   Comparison: None   Additional Information: none   Interfering Medications: None   Technique:   The patient initially received 1 ml of Lugol's solution orally prior     Antiphospholipid antibody syndrome (H) 2005    Ravi's     Articulation disorder 09/23/2020     Atrial fibrillation (H) 04/2011     Cirrhosis (H)      CKD (chronic kidney disease) stage 2, GFR 60-89 ml/min      Diabetes mellitus type II     steroid induced     DJD (degenerative joint disease) of knee     SHAWN, worse on right     DVT (deep venous thrombosis) (H)      ED (erectile dysfunction)      Gallbladder polyp 05/2010     Cccrf-Uawqqc-Heed Disease 2007    BMT     Hemochromatosis      Hodgkin's disease NOS     5 cycles of ABVD in 2011     HTN (hypertension)      Hyperlipidaemia LDL goal <100      Multiple thyroid nodules     benign      Myeloproliferative disorder (H)     atypical, [facility]     Parkinson disease (H) 09/24/2020     PE (pulmonary embolism) 11/2004     Polyneuropathy      Primary pulmonary hypertension (H)      Social History     Socioeconomic History     Marital status:      Spouse name: Angelica     Number of children: 2     Years of education: 21     Highest education level: Not on file   Occupational History     Occupation:      Employer: [employer]     Employer: DISABLED   Social Needs     Financial resource strain: Not on file     Food insecurity     Worry: Not on file     Inability: Not on file     Transportation needs     Medical: Not on file     Non-medical: Not on file   Tobacco Use     Smoking status: Never Smoker     Smokeless tobacco: Never Used   Substance and Sexual Activity      Alcohol use: No     Drug use: No     Sexual activity: Not Currently     Partners: Female   Lifestyle     Physical activity     Days per week: Not on file     Minutes per session: Not on file     Stress: Not on file   Relationships     Social connections     Talks on phone: Not on file     Gets together: Not on file     Attends Gnosticism service: Not on file     Active member of club or organization: Not on file     Attends meetings of clubs or organizations: Not on file     Relationship status: Not on file     Intimate partner violence     Fear of current or ex partner: Not on file     Emotionally abused: Not on file     Physically abused: Not on file     Forced sexual activity: Not on file   Other Topics Concern     Parent/sibling w/ CABG, MI or angioplasty before 65F 55M? No   Social History Narrative     about 50 yrs        Wife has some health issues - back pain - second knee replaced    She had gastric bypass and a fib and bad mitral valve        Son in [place]    Daughter in [facility] with 8 yo son.         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[employer]        Born and raised in [facility]                Drug and lactation database from the [place] National Library of Medicine:  http://toxnet.nlm.nih.gov/cgi-bin/sis/htmlgen?LACT      B/P: Data Unavailable, T: Data Unavailable, P: Data Unavailable, R: Data Unavailable 0 lbs 0 oz  There were no vitals taken for this visit., There is no height or weight on file to calculate BMI.  Medications and Vitals not listed above were documented in the cart and reviewed by me.     Current Outpatient Medications   Medication Sig Dispense Refill     acetaminophen (TYLENOL) 500 MG tablet 500mg tab as needed       amoxicillin (AMOXIL) 500 MG capsule 4 x 500mg tabs by mouth 1 hour before dental appointments.       calcium carbonate (TUMS) 500 MG chewable tablet 1 tab by mouth in morning or at night as needed       flecainide (TAMBOCOR) 50 MG tablet  Take 1 tablet (50 mg) by mouth 2 times daily 180 tablet 2     fluticasone (FLONASE) 50 MCG/ACT nasal spray Spray 1 spray into both nostrils daily       loratadine (CLARITIN) 10 MG tablet Take 10 mg by mouth daily       metoprolol tartrate (LOPRESSOR) 25 MG tablet TAKE 1 TABLET BY MOUTH TWICE A  tablet 3     multivitamin (CENTRUM SILVER) tablet Take 1 tablet by mouth daily       Nutritional Supplements (ENSURE PO)        PREVIDENT 5000 DRY MOUTH 1.1 % GEL topical gel Take by mouth daily  3     simvastatin (ZOCOR) 10 MG tablet Take 1 tablet (10 mg) by mouth daily 90 tablet 3     warfarin ANTICOAGULANT (COUMADIN) 2.5 MG tablet TAKE 1.25 MG (1/2 TABLET) MON/FRI AND 2.5 MG (1 TABLET) ALL OTHER DAYS. NEED APPT. 90 tablet 0         Medications                                                                                                                                                  Ángel Hill MD  Answers for HPI/ROS submitted by the patient on 2/12/2021   General Symptoms: Yes  Skin Symptoms: No  HENT Symptoms: Yes  EYE SYMPTOMS: No  HEART SYMPTOMS: No  LUNG SYMPTOMS: No  INTESTINAL SYMPTOMS: Yes  URINARY SYMPTOMS: Yes  REPRODUCTIVE SYMPTOMS: No  SKELETAL SYMPTOMS: No  BLOOD SYMPTOMS: No  NERVOUS SYSTEM SYMPTOMS: Yes  MENTAL HEALTH SYMPTOMS: No  Fever: No  Loss of appetite: Yes  Weight loss: Yes  Weight gain: No  Fatigue: No  Night sweats: No  Chills: Yes  Increased stress: No  Excessive hunger: No  Excessive thirst: No  Feeling hot or cold when others believe the temperature is normal: No  Loss of height: No  Post-operative complications: No  Surgical site pain: No  Hallucinations: No  Change in or Loss of Energy: No  Hyperactivity: No  Confusion: No  Ear pain: No  Ear discharge: No  Hearing loss: No  Tinnitus: No  Nosebleeds: No  Congestion: No  Sinus pain: No  Trouble swallowing: Yes   Voice hoarseness: Yes  Mouth sores: No  Sore throat: No  Tooth pain: No  Gum tenderness: No  Bleeding gums: No  Change in  taste: No  Change in sense of smell: No  Dry mouth: Yes  Hearing aid used: No  Neck lump: No  Heart burn or indigestion: No  Nausea: No  Vomiting: No  Abdominal pain: No  Bloating: No  Constipation: Yes  Diarrhea: Yes  Blood in stool: No  Black stools: No  Rectal or Anal pain: No  Fecal incontinence: No  Yellowing of skin or eyes: No  Vomit with blood: No  Change in stools: No  Trouble holding urine or incontinence: No  Pain or burning: No  Trouble starting or stopping: Yes  Increased frequency of urination: Yes  Blood in urine: No  Decreased frequency of urination: No  Frequent nighttime urination: Yes  Flank pain: No  Difficulty emptying bladder: Yes  Trouble with coordination: No  Dizziness or trouble with balance: Yes  Fainting or black-out spells: No  Memory loss: No  Headache: No  Seizures: No  Speech problems: No  Tingling: No  Tremor: Yes  Weakness: No  Difficulty walking: No  Paralysis: No  Numbness: No

## 2021-02-03 ENCOUNTER — HOSPITAL ENCOUNTER (OUTPATIENT)
Dept: PHYSICAL THERAPY | Facility: CLINIC | Age: 71
Setting detail: THERAPIES SERIES
End: 2021-02-03
Attending: OTOLARYNGOLOGY
Payer: COMMERCIAL

## 2021-02-03 ENCOUNTER — HOSPITAL ENCOUNTER (OUTPATIENT)
Dept: SPEECH THERAPY | Facility: CLINIC | Age: 71
Setting detail: THERAPIES SERIES
End: 2021-02-03
Attending: OTOLARYNGOLOGY
Payer: COMMERCIAL

## 2021-02-03 PROCEDURE — 97110 THERAPEUTIC EXERCISES: CPT | Mod: GP,95,59 | Performed by: PHYSICAL THERAPIST

## 2021-02-03 PROCEDURE — 92526 ORAL FUNCTION THERAPY: CPT | Mod: GN,95 | Performed by: SPEECH-LANGUAGE PATHOLOGIST

## 2021-02-03 PROCEDURE — 97530 THERAPEUTIC ACTIVITIES: CPT | Mod: GP,95,59 | Performed by: PHYSICAL THERAPIST

## 2021-02-03 NOTE — PROGRESS NOTES
Outpatient Physical Therapy Discharge Note     Patient: Haresh Rock  : 1950    Beginning/End Dates of Reporting Period:  12/3/20 to 2/3/2021    Referring Provider: P Tuite    Therapy Diagnosis: PD      Client Self Report: doing fine; driking ensure, constipation.  chills. no fever. bp ok and blood oxygen was good.  went on TM x 20 min a couple times.      Objective Measurements:  Objective Measure: 5xSTS  Details: 14.7 sec    Goals:  Goal Identifier falls prevention   Goal Description pt to verbalize 3+ falls prevention ideas   Target Date 21   Date Met  20   Progress:     Goal Identifier gait   Goal Description pt to walk on his own treadmill x 8-10 min for wellness and exer safely   Target Date 21   Date Met  21   Progress:     Goal Identifier Sit to stand; nearly met   Goal Description pt to be indep w/ STS in home and perf 5x STS in 13 sec or less   Target Date 21   Date Met      Progress:     Progress Toward Goals:   Progress this reporting period: see above    Plan:  Discharge from therapy.    Discharge:  yes  Reason for Discharge: Patient has met 2/3 goals.    Equipment Issued: none    Discharge Plan: Patient to continue home program.

## 2021-02-03 NOTE — PROGRESS NOTES
02/03/21 0900   Signing Clinician's Name / Credentials   Signing clinician's name / credentials Claire Gregg PT   Session Number   Session Number 4   [facility]   Goal 1   Goal Identifier falls prevention   Goal Description pt to verbalize 3+ falls prevention ideas   Target Date 03/02/21   Date Met 12/24/20   Goal 2   Goal Identifier gait   Goal Description pt to walk on his own treadmill x 8-10 min for wellness and exer safely   Target Date 03/02/21   Date Met 01/14/21   Goal 3   Goal Identifier Sit to stand; nearly met   Goal Description pt to be indep w/ STS in home and perf 5x STS in 13 sec or less   Target Date 03/02/21   Subjective Report   Subjective Report doing fine; driking ensure, constipation.  chills. no fever. bp ok and blood oxygen was good.  went on TM x 20 min a couple times.     Objective Measure 1   Objective Measure 5xSTS   Details 14.7 sec   Therapeutic Procedure/exercise   Therapeutic Procedures: strength, endurance, ROM, flexibillity minutes (67730) 10   Skilled Intervention HEP   Patient Response unruly well; agrees   Treatment Detail rec he do ap's seated and laq's.  he is able to do these in sitting today.  rec 3 x/ wk or more for TM.  rec; he get up and walk around house during the day.  he agrees   Therapeutic Activity   Therapeutic Activities: dynamic activities to improve functional performance minutes (04041) 15   Skilled Intervention STS, other   Patient Response unruly well   Treatment Detail STS above.  pt able to walk in main level and do stairs x 7 w/ rail education re; tremor.   educ re; keeping elbows on table for support when using hands if tremor bothers. rec OT if needed in future. he understands how to reach me or doctor.   Progress has a RTS if needed (has from knee surgery)   Education   Learner Patient   Readiness Eager;Acceptance   Method Explanation;Demonstration   Response Verbalizes Understanding;Demonstrates Understanding   Education Comments  HEP   Communication with  other professionals   Communication with other professionals slp therapist re eating.    Plan   Homework above   Home program 5x STS and TM trial at home, neck rom   Updates to plan of care 2 goals met; one nearly met   Plan for next session plans to do TM MWF; has HEP. will d/c.  from PT - still has ongoing SLP   Comments   Comments wife Angelica.   GT code for televisit   Total Session Time   Timed Code Treatment Minutes 25   Total Treatment Time (sum of timed and untimed services) 25

## 2021-02-05 ENCOUNTER — TRANSFERRED RECORDS (OUTPATIENT)
Dept: HEALTH INFORMATION MANAGEMENT | Facility: CLINIC | Age: 71
End: 2021-02-05

## 2021-02-05 ENCOUNTER — ANTICOAGULATION THERAPY VISIT (OUTPATIENT)
Dept: FAMILY MEDICINE | Facility: CLINIC | Age: 71
End: 2021-02-05

## 2021-02-05 DIAGNOSIS — D68.61 ANTIPHOSPHOLIPID ANTIBODY SYNDROME (H): ICD-10-CM

## 2021-02-05 DIAGNOSIS — I48.91 ATRIAL FIBRILLATION (H): ICD-10-CM

## 2021-02-05 DIAGNOSIS — Z79.01 LONG TERM CURRENT USE OF ANTICOAGULANT THERAPY: ICD-10-CM

## 2021-02-05 DIAGNOSIS — Z86.718 PERSONAL HISTORY OF DVT (DEEP VEIN THROMBOSIS): ICD-10-CM

## 2021-02-05 LAB — INR PPP: 1.8 (ref 0.9–1.1)

## 2021-02-05 NOTE — PROGRESS NOTES
ANTICOAGULATION MANAGEMENT     Patient Name:  Haresh Rock  Date:  2021    ASSESSMENT /SUBJECTIVE:    Today's INR result of 1.8 is subtherapeutic. Goal INR of 2.0-3.0      Warfarin dose taken: Warfarin taken as instructed    Diet: Daily ensure    Medication changes/ interactions: No new medications/supplements affecting INR    Previous INR: Therapeutic     S/S of bleeding or thromboembolism: No    New injury or illness: Yes: Constipation from ensure. Taking Senna    Upcoming surgery, procedure or cardioversion: No    Additional findings: None      PLAN:    Telephone call with Haresh regarding INR result and instructed:     Warfarin Dosing Instructions: 3.75 mg boost dose  then continue your current warfarin dose of 1.25 mg Mon, thu; 2.5 mg all other days    Instructed patient to follow up no later than: 1 week  Patient to recheck with home meter    Education provided: Monitoring for bleeding signs and symptoms and Monitoring for clotting signs and symptoms      Haresh verbalizes understanding and agrees to warfarin dosing plan.    Instructed to call the Anticoagulation Clinic for any changes, questions or concerns. (#[phone])        Allyn Doe RN      OBJECTIVE:  Recent labs: (last 7 days)     21   INR 1.8*         No question data found.  Anticoagulation Summary  As of 2021    INR goal:  2.0-3.0   TTR:  75.9 % (1 y)   INR used for dosin.8 (2021)   Warfarin maintenance plan:  1.25 mg (2.5 mg x 0.5) every Mon, Thu; 2.5 mg (2.5 mg x 1) all other days   Full warfarin instructions:  : 3.75 mg; Otherwise 1.25 mg every Mon, Thu; 2.5 mg all other days   Weekly warfarin total:  15 mg   Plan last modified:  Hari Saavedra RN (2020)   Next INR check:  2021   Priority:  Maintenance   Target end date:  Indefinite    Indications    Long-term (current) use of anticoagulants [Z79.01] [Z79.01]  Atrial fibrillation (H) [I48.91]  Antiphospholipid antibody syndrome (H) [D68.61]  Personal  history of DVT (deep vein thrombosis) [Z86.718]  Atrial fibrillation  unspecified type (H) (Resolved) [I48.91]             Anticoagulation Episode Summary     INR check location:      Preferred lab:  [facility]TER[facility]    Send INR reminders to:  CHELSEA CHILEL    Comments:  JESSICA okay to manage by exception      Anticoagulation Care Providers     Provider Role Specialty Phone number    Anya Nowak MD Referring Family Medicine [phone]

## 2021-02-08 ENCOUNTER — HOSPITAL ENCOUNTER (OUTPATIENT)
Dept: SPEECH THERAPY | Facility: CLINIC | Age: 71
Setting detail: THERAPIES SERIES
End: 2021-02-08
Attending: OTOLARYNGOLOGY
Payer: COMMERCIAL

## 2021-02-08 PROCEDURE — 92526 ORAL FUNCTION THERAPY: CPT | Mod: GN,95 | Performed by: SPEECH-LANGUAGE PATHOLOGIST

## 2021-02-08 NOTE — PROGRESS NOTES
Haresh Rock is a 70 year old male who is being seen via a billable video visit.      Patient has given verbal consent for Video visit? Yes    Video Start Time: 11:00 am     Telehealth Visit Details    Type of Service:  Telehealth    Video End Time (time video stopped): 11:48 am     Originating Location (pt. location): Home    Additional Participants in Telehealth Visit: None     Distant Location (provider location):  [facility]     Mode of Communication (Audio Visual or Audio Only):  Audio Visual     Linda Anthony, SLP  February 8, 2021

## 2021-02-10 ENCOUNTER — TRANSFERRED RECORDS (OUTPATIENT)
Dept: HEALTH INFORMATION MANAGEMENT | Facility: CLINIC | Age: 71
End: 2021-02-10

## 2021-02-12 ENCOUNTER — TRANSFERRED RECORDS (OUTPATIENT)
Dept: HEALTH INFORMATION MANAGEMENT | Facility: CLINIC | Age: 71
End: 2021-02-12

## 2021-02-12 ENCOUNTER — VIRTUAL VISIT (OUTPATIENT)
Dept: NEUROLOGY | Facility: CLINIC | Age: 71
End: 2021-02-12
Payer: COMMERCIAL

## 2021-02-12 ENCOUNTER — MYC REFILL (OUTPATIENT)
Dept: FAMILY MEDICINE | Facility: CLINIC | Age: 71
End: 2021-02-12

## 2021-02-12 ENCOUNTER — TELEPHONE (OUTPATIENT)
Dept: NEUROLOGY | Facility: CLINIC | Age: 71
End: 2021-02-12

## 2021-02-12 ENCOUNTER — ANTICOAGULATION THERAPY VISIT (OUTPATIENT)
Dept: FAMILY MEDICINE | Facility: CLINIC | Age: 71
End: 2021-02-12

## 2021-02-12 DIAGNOSIS — D68.61 ANTIPHOSPHOLIPID ANTIBODY SYNDROME (H): ICD-10-CM

## 2021-02-12 DIAGNOSIS — Z86.718 PERSONAL HISTORY OF DVT (DEEP VEIN THROMBOSIS): ICD-10-CM

## 2021-02-12 DIAGNOSIS — I48.91 ATRIAL FIBRILLATION (H): ICD-10-CM

## 2021-02-12 DIAGNOSIS — G20.A1 PARKINSON DISEASE (H): Primary | ICD-10-CM

## 2021-02-12 DIAGNOSIS — C85.90 NON-HODGKIN LYMPHOMA, UNSPECIFIED, UNSPECIFIED SITE (H): ICD-10-CM

## 2021-02-12 DIAGNOSIS — D89.811 CHRONIC GRAFT-VERSUS-HOST DISEASE (H): ICD-10-CM

## 2021-02-12 DIAGNOSIS — Z79.01 LONG TERM CURRENT USE OF ANTICOAGULANT THERAPY: ICD-10-CM

## 2021-02-12 LAB — INR PPP: 2.1 (ref 0.9–1.1)

## 2021-02-12 PROCEDURE — 99214 OFFICE O/P EST MOD 30 MIN: CPT | Mod: 95 | Performed by: PSYCHIATRY & NEUROLOGY

## 2021-02-12 RX ORDER — AMOXICILLIN 250 MG
2 CAPSULE ORAL DAILY
COMMUNITY
End: 2021-10-08

## 2021-02-12 RX ORDER — POLYETHYLENE GLYCOL 3350 17 G/17G
1 POWDER, FOR SOLUTION ORAL DAILY PRN
COMMUNITY
End: 2021-09-20

## 2021-02-12 RX ORDER — FOLIC ACID 1 MG/1
1 TABLET ORAL DAILY
COMMUNITY
End: 2021-03-31

## 2021-02-12 RX ORDER — SENNOSIDES 8.6 MG
2 TABLET ORAL DAILY
COMMUNITY
End: 2021-02-12

## 2021-02-12 ASSESSMENT — ENCOUNTER SYMPTOMS
FLANK PAIN: 0
MEMORY LOSS: 0
WEIGHT GAIN: 0
SMELL DISTURBANCE: 0
POLYDIPSIA: 0
TROUBLE SWALLOWING: 1
TINGLING: 0
RECTAL PAIN: 0
DIZZINESS: 1
PARALYSIS: 0
NECK MASS: 0
SINUS CONGESTION: 0
FEVER: 0
NAUSEA: 0
TASTE DISTURBANCE: 0
JAUNDICE: 0
HEMATURIA: 0
WEAKNESS: 0
POLYPHAGIA: 0
BLOOD IN STOOL: 0
HEADACHES: 0
CHILLS: 1
BLOATING: 0
WEIGHT LOSS: 1
HEARTBURN: 0
SPEECH CHANGE: 0
SINUS PAIN: 0
CONSTIPATION: 1
SORE THROAT: 0
DIFFICULTY URINATING: 1
ALTERED TEMPERATURE REGULATION: 0
SEIZURES: 0
ABDOMINAL PAIN: 0
NUMBNESS: 0
VOMITING: 0
NIGHT SWEATS: 0
FATIGUE: 0
DYSURIA: 0
DISTURBANCES IN COORDINATION: 0
DIARRHEA: 1
DECREASED APPETITE: 1
TREMORS: 1
INCREASED ENERGY: 0
HALLUCINATIONS: 0
LOSS OF CONSCIOUSNESS: 0
HOARSE VOICE: 1
BOWEL INCONTINENCE: 0

## 2021-02-12 NOTE — LETTER
2021       RE: Haresh Rock  [address] [place] [address]     Dear Colleague,    Thank you for referring your patient, Haresh Rock, to the [facility] NEUROLOGY CLINIC [place] at [facility]. Please see a copy of my visit note below.        Diagnosis/Summary/Recommendations:    Video visit using his iphone.     PATIENT: Haresh Rock  70 year old male     : 1950    KEN: 2021    [address] [place] [id number] [phone] (M)  [phone] (H)  [email or web address]  Has mychart  proxy - sheyla Ramirez - son  Gemma - daughter  Sheyla Rock  [phone]    [email or web address],   [email or web address]  [email or web address]    Assessment:  Using iphone for video visit today with software    (G20) Parkinson disease (H)  (primary encounter diagnosis)  Dopamine scan - (datscan)  11/3/2020  A presynaptic dopaminergic deficit is demonstrated bilaterally.    CARMITA Anthony, SLP   WINTER Gregg, PT    Gait/Balance/Falls-- no falls  No more PT - doing well    Had chills and tremors and resolved   No fever - 2 or 3 hours  Breathing was fine.     Exercise/Therapy    Cognitive/Driving    Mood - denies -     Bored. Waiting for vaccination  Signed up for the  project  [employer]    Daughter with bipolar  Son with mood/ocd/eating    Hallucinations/delusions     Sleep  Pseudoeph-doxylamine DM APAP nyquil    Bladder  Renal function.   May have stage 2 disease.      Prostate - denies problems. He has has nocturia couple times per night.   Denies elevated psa    GI/Constipation/GERD  Swallow study 2020  1. Multiple events of deep laryngeal penetration with thinner barium consistencies with intermittent aspiration.     Gallstones but has not gallbladder removed.  Had a gallbladder attack x 1 and is not on actigall    Calcium carbonate tums 500mg  Nutritional supplement - 1 bottle per day   Pantoprazole protonix 20mg - not using  Polythyelene  glycol miralax - encouraged to use   Prevident 5000 dry mouth - using  Senna-docusate senexon-S senokot-S 2 tabs per day     ENDO  Cholesterol  simvastatin 10mg/day  Had diabetes in the past from prednisone  And this may have resolved  May have a thyroid nodule.  TSH was normal.     Cardio/heart  Flecainide tambocor 50mg  Metoprolol tartrate lopressor 25mg    Vision    Heme  Anticoagulated  warfarin     Hereditary hemochromatosis gene - gene that increases his risk and has not had problems with this. Had had phlebotomies x 2 in the past.     AYUSH Torres     History of pulmonary embolii - back in 2004. This was related to blood clotting problems.   May have had a DVT. Reportedly has had antiphospholipid antibody. He is on coumadin.      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 Had a transplant from Dr. 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  No problems since then.     Other:    Hodgkin's lymphoma in the past 2011 and had chemotherapy for this and followed with CT scan and reportedly this is gone.      Pulmonary hypertension in the past.     msk - bilateral knee replacements.     Eye - left eye has been poor since age 2 due to a lazy eye - amblyopia  Right eye post cataract surgery had an artificial lens put in and does not wear glasses till the evening      Hearing. Feels he has wax in his left ear and partial problem with the right ear. It is variable problem. Has used ear wax removal.      He reports a neuropathy     Medications                 Acetaminophen tylenol 500mg As needed            Amoxicillin amoxil 500mg For dentist           Calcium carbonate tums 500mg As needed           Flecainide tambocor 50mg 1     1     Folic acid folvite 1mg 1           Fluticasone flonase 50 mcg/act nasal spray Not using       Loratadine claritin 10mg  Not taking       Metoprolol tartrate lopressor 25mg 1     1     MVI centrum variable           Nutritional supplement -  ensure 1 bottle       Pantoprazole protonix 20mg Not taking         Polythyelene glycol  miralax encouraged to use       Prevident 5000 dry mouth using           Pseudoeph-doxylamine DM APAP nyquil Not taking usually           Senna-docusate 8.6-50 2       Simvastatin zocor 10mg       1     Warfarin anticoagulant coumadin 2.5mg 5 days - 1 tab; 1/2 tab m/f                              Plan:  No change in regimen    Discussed KN95 mask and fabric    [place]://[place]vaccine/find-vaccine/index.jsp  Discussed vaccination.   Tried to help him on scheduling for vaccination    Coding statement:   Medical Decision Making:  #  Chronic progressive medical conditions addressed   - discussed parkinson and other   Review and/or interpretation of unique test or documentation from a provider outside of neurology  - no   Independent historian provided additional details  Yes  - spouse  Prescription drug management and review of potential side effects and/or monitoring for side effects   -discussed   Health impacted by social determinants of health  No     I have reviewed the note as documented above.  This accurately captures the substance of my conversation with the patient and total time spent preparing for visit, executing visit and completing visit on the day of the visit:  30 minutes.      Ángel Hill MD     ______________________________________    Last visit date and details:             ______________________________________      Patient was asked about 14 Review of systems including changes in vision (dry eyes, double vision), hearing, heart, lungs, musculoskeletal, depression, anxiety, snoring, RBD, insomnia, urinary frequency, urinary urgency, constipation, swallowing problems, hematological, ID, allergies, skin problems: seborrhea, endocrinological: thyroid, diabetes, cholesterol; balance, weight changes, and other neurological problems and these were not significant at this time except for   Patient Active Problem  List   Diagnosis     Hemochromatosis     Antiphospholipid antibody syndrome (H)     Filna-saxtlg-nauk disease, chronic (H)     Advanced directives, counseling/discussion     Polyneuropathy     Status post total knee replacements     Status post bone marrow transplant (H)     Hyperlipidemia with target LDL less than 100     Atrial fibrillation (H)     Personal history of DVT (deep vein thrombosis)     Chronic rhinitis     Gallstones without obstruction of gallbladder     Long-term (current) use of anticoagulants [Z79.01]     Thyroid nodule     Type 2 diabetes mellitus with stage 2 chronic kidney disease (H)     Renal hypertension     History of Hodgkin's lymphoma     History of irradiation, presenting hazards to health     Primary pulmonary hypertension (H)     Hereditary hemochromatosis (H)     Oropharyngeal dysphagia     Articulation disorder     Cardiac dysrhythmia     Disease of blood and blood forming organ     Personal history of PE (pulmonary embolism)     Complications of bone marrow transplant (H)     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)     Renal failure, chronic     Diabetes mellitus type 1 (H)     Hypotension     Essential hypertension     Hyperlipidemia     PAF (paroxysmal atrial fibrillation) (H)     Total knee replacement status     Parkinson disease (H)     Abnormal dopamine scan (DaTSCAN) 2020     Chronic kidney disease, stage 3          Allergies   Allergen Reactions     No Known Drug Allergy      Past Surgical History:   Procedure Laterality Date     ANESTHESIA CARDIOVERSION  4/21/2011    Procedure:ANESTHESIA CARDIOVERSION; Surgeon:GENERIC ANESTHESIA PROVIDER; Location:[facility] OR     ARTHROSCOPY KNEE RT/LT      LT     CATARACT IOL, RT/LT  2017     COLONOSCOPY  10/16/2012    Procedure: COLONOSCOPY;  Colonoscopy, screening;  Surgeon: Reg Feliciano MD;  Location:  OR     ESOPHAGOSCOPY, GASTROSCOPY, DUODENOSCOPY (EGD), COMBINED N/A 10/7/2020    Procedure: ESOPHAGOGASTRODUODENOSCOPY, WITH BIOPSY;   Surgeon: Raul Sawyer DO;  Location: [facility] OR     H ABLATION FOCAL AFIB  3/5/2013    PVI     H ABLATION FOCAL AFIB  01/01/2018     HC BONE MARROW/STEM TRANSPLANT ALLOGENIC  5/8/2007     INSERT PORT VASCULAR ACCESS       JOINT REPLACEMTN, KNEE RT/LT  3/28/12    SHAWN     VASECTOMY  1990     Past Medical History:   Diagnosis Date     Abnormal dopamine scan (DaTSCAN) 2020 11/04/2020    [phone] 11/3/2020   Narrative & Impression   Examination: Nuclear medicine DATscan for Dopamine Receptor Localization.   Examination: NM Brain Image Tomographic (SPECT) DATscan   Date: 11/3/2020 3:21 PM   Indication: Parkinsonism   Comparison: None   Additional Information: none   Interfering Medications: None   Technique:   The patient initially received 1 ml of Lugol's solution orally prior     Antiphospholipid antibody syndrome (H) 2005    Ravi's     Articulation disorder 09/23/2020     Atrial fibrillation (H) 04/2011     Cirrhosis (H)      CKD (chronic kidney disease) stage 2, GFR 60-89 ml/min      Diabetes mellitus type II     steroid induced     DJD (degenerative joint disease) of knee     SHAWN, worse on right     DVT (deep venous thrombosis) (H)      ED (erectile dysfunction)      Gallbladder polyp 05/2010     Masha-Gxflkq-Budy Disease 2007    BMT     Hemochromatosis      Hodgkin's disease NOS     5 cycles of ABVD in 2011     HTN (hypertension)      Hyperlipidaemia LDL goal <100      Multiple thyroid nodules     benign      Myeloproliferative disorder (H)     atypical, [facility]     Parkinson disease (H) 09/24/2020     PE (pulmonary embolism) 11/2004     Polyneuropathy      Primary pulmonary hypertension (H)      Social History     Socioeconomic History     Marital status:      Spouse name: Angelica     Number of children: 2     Years of education: 21     Highest education level: Not on file   Occupational History     Occupation:      Employer: [employer]     Employer: DISABLED   Social Needs     Financial  resource strain: Not on file     Food insecurity     Worry: Not on file     Inability: Not on file     Transportation needs     Medical: Not on file     Non-medical: Not on file   Tobacco Use     Smoking status: Never Smoker     Smokeless tobacco: Never Used   Substance and Sexual Activity     Alcohol use: No     Drug use: No     Sexual activity: Not Currently     Partners: Female   Lifestyle     Physical activity     Days per week: Not on file     Minutes per session: Not on file     Stress: Not on file   Relationships     Social connections     Talks on phone: Not on file     Gets together: Not on file     Attends Gnosticism service: Not on file     Active member of club or organization: Not on file     Attends meetings of clubs or organizations: Not on file     Relationship status: Not on file     Intimate partner violence     Fear of current or ex partner: Not on file     Emotionally abused: Not on file     Physically abused: Not on file     Forced sexual activity: Not on file   Other Topics Concern     Parent/sibling w/ CABG, MI or angioplasty before 65F 55M? No   Social History Narrative     about 50 yrs        Wife has some health issues - back pain - second knee replaced    She had gastric bypass and a fib and bad mitral valve        Son in [place]    Daughter in [facility] with 8 yo son.         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[employer]        Born and raised in [place]                Drug and lactation database from the [place] National Library of Medicine:  http://toxnet.nlm.nih.gov/cgi-bin/sis/htmlgen?LACT      B/P: Data Unavailable, T: Data Unavailable, P: Data Unavailable, R: Data Unavailable 0 lbs 0 oz  There were no vitals taken for this visit., There is no height or weight on file to calculate BMI.  Medications and Vitals not listed above were documented in the cart and reviewed by me.     Current Outpatient Medications   Medication Sig Dispense Refill      acetaminophen (TYLENOL) 500 MG tablet 500mg tab as needed       amoxicillin (AMOXIL) 500 MG capsule 4 x 500mg tabs by mouth 1 hour before dental appointments.       calcium carbonate (TUMS) 500 MG chewable tablet 1 tab by mouth in morning or at night as needed       flecainide (TAMBOCOR) 50 MG tablet Take 1 tablet (50 mg) by mouth 2 times daily 180 tablet 2     fluticasone (FLONASE) 50 MCG/ACT nasal spray Spray 1 spray into both nostrils daily       loratadine (CLARITIN) 10 MG tablet Take 10 mg by mouth daily       metoprolol tartrate (LOPRESSOR) 25 MG tablet TAKE 1 TABLET BY MOUTH TWICE A  tablet 3     multivitamin (CENTRUM SILVER) tablet Take 1 tablet by mouth daily       Nutritional Supplements (ENSURE PO)        PREVIDENT 5000 DRY MOUTH 1.1 % GEL topical gel Take by mouth daily  3     simvastatin (ZOCOR) 10 MG tablet Take 1 tablet (10 mg) by mouth daily 90 tablet 3     warfarin ANTICOAGULANT (COUMADIN) 2.5 MG tablet TAKE 1.25 MG (1/2 TABLET) MON/FRI AND 2.5 MG (1 TABLET) ALL OTHER DAYS. NEED APPT. 90 tablet 0         Medications                                                                                                                                                  Ángel Hill MD  Answers for HPI/ROS submitted by the patient on 2/12/2021   General Symptoms: Yes  Skin Symptoms: No  HENT Symptoms: Yes  EYE SYMPTOMS: No  HEART SYMPTOMS: No  LUNG SYMPTOMS: No  INTESTINAL SYMPTOMS: Yes  URINARY SYMPTOMS: Yes  REPRODUCTIVE SYMPTOMS: No  SKELETAL SYMPTOMS: No  BLOOD SYMPTOMS: No  NERVOUS SYSTEM SYMPTOMS: Yes  MENTAL HEALTH SYMPTOMS: No  Fever: No  Loss of appetite: Yes  Weight loss: Yes  Weight gain: No  Fatigue: No  Night sweats: No  Chills: Yes  Increased stress: No  Excessive hunger: No  Excessive thirst: No  Feeling hot or cold when others believe the temperature is normal: No  Loss of height: No  Post-operative complications: No  Surgical site pain: No  Hallucinations: No  Change in or Loss of Energy:  No  Hyperactivity: No  Confusion: No  Ear pain: No  Ear discharge: No  Hearing loss: No  Tinnitus: No  Nosebleeds: No  Congestion: No  Sinus pain: No  Trouble swallowing: Yes   Voice hoarseness: Yes  Mouth sores: No  Sore throat: No  Tooth pain: No  Gum tenderness: No  Bleeding gums: No  Change in taste: No  Change in sense of smell: No  Dry mouth: Yes  Hearing aid used: No  Neck lump: No  Heart burn or indigestion: No  Nausea: No  Vomiting: No  Abdominal pain: No  Bloating: No  Constipation: Yes  Diarrhea: Yes  Blood in stool: No  Black stools: No  Rectal or Anal pain: No  Fecal incontinence: No  Yellowing of skin or eyes: No  Vomit with blood: No  Change in stools: No  Trouble holding urine or incontinence: No  Pain or burning: No  Trouble starting or stopping: Yes  Increased frequency of urination: Yes  Blood in urine: No  Decreased frequency of urination: No  Frequent nighttime urination: Yes  Flank pain: No  Difficulty emptying bladder: Yes  Trouble with coordination: No  Dizziness or trouble with balance: Yes  Fainting or black-out spells: No  Memory loss: No  Headache: No  Seizures: No  Speech problems: No  Tingling: No  Tremor: Yes  Weakness: No  Difficulty walking: No  Paralysis: No  Numbness: No      Haresh is a 70 year old who is being evaluated via a billable video visit.      How would you like to obtain your AVS? [employer]  If the video visit is dropped, the invitation should be resent by: [phone]  Will anyone else be joining your video visit? no      Video Start Time:   Video-Visit Details    Type of service:  Video Visit    Video End Time:    Originating Location (pt. Location):     Distant Location (provider location):  [facility] NEUROLOGY [facility]     Platform used for Video Visit: [email or web address]      Again, thank you for allowing me to participate in the care of your patient.      Sincerely,    Ángel Hill MD

## 2021-02-12 NOTE — TELEPHONE ENCOUNTER
Haresh is concerned about making it to his appointment given it is very cold and icy outside. He know Dr. Hill wanted to see him in person and stated he can try to make it if needed. I informed Haresh given the temperature and minimal  parking it is perfectly okay to switch to video. After this appointment he should schedule an in person appointment with Dr. Hill.

## 2021-02-12 NOTE — PATIENT INSTRUCTIONS
Using iphone for video visit today with software    (G20) Parkinson disease (H)  (primary encounter diagnosis)  Dopamine scan - (datscan)  11/3/2020  A presynaptic dopaminergic deficit is demonstrated bilaterally.    CARMITA Anthony, SLP   WINTER Gregg, PT    Gait/Balance/Falls-- no falls  No more PT - doing well    Had chills and tremors and resolved   No fever - 2 or 3 hours  Breathing was fine.     Exercise/Therapy    Cognitive/Driving    Mood - denies -     Bored. Waiting for vaccination  Signed up for the  project  [employer] primary    Daughter with bipolar  Son with mood/ocd/eating    Hallucinations/delusions     Sleep  Pseudoeph-doxylamine DM APAP nyquil    Bladder  Renal function.   May have stage 2 disease.      Prostate - denies problems. He has has nocturia couple times per night.   Denies elevated psa    GI/Constipation/GERD  Swallow study 9/22/2020  1. Multiple events of deep laryngeal penetration with thinner barium consistencies with intermittent aspiration.     Gallstones but has not gallbladder removed.  Had a gallbladder attack x 1 and is not on actigall    Calcium carbonate tums 500mg  Nutritional supplement - 1 bottle per day   Pantoprazole protonix 20mg - not using  Polythyelene glycol miralax - encouraged to use   Prevident 5000 dry mouth - using  Senna-docusate senexon-S senokot-S 2 tabs per day     ENDO  Cholesterol  simvastatin 10mg/day  Had diabetes in the past from prednisone  And this may have resolved  May have a thyroid nodule.  TSH was normal.     Cardio/heart  Flecainide tambocor 50mg  Metoprolol tartrate lopressor 25mg    Vision      Heme  Anticoagulated  warfarin     Hereditary hemochromatosis gene - gene that increases his risk and has not had problems with this. Had had phlebotomies x 2 in the past.     AYUSH Torres     History of pulmonary embolii - back in 2004. This was related to blood clotting problems.   May have had a DVT. Reportedly has had antiphospholipid antibody. He is on  coumadin.      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 Had a transplant from Dr. 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  No problems since then.     Other:    Hodgkin's lymphoma in the past 2011 and had chemotherapy for this and followed with CT scan and reportedly this is gone.      Pulmonary hypertension in the past.     msk - bilateral knee replacements.     Eye - left eye has been poor since age 2 due to a lazy eye - amblyopia  Right eye post cataract surgery had an artificial lens put in and does not wear glasses till the evening      Hearing. Feels he has wax in his left ear and partial problem with the right ear. It is variable problem. Has used ear wax removal.      He reports a neuropathy     Medications                 Acetaminophen tylenol 500mg As needed            Amoxicillin amoxil 500mg For dentist           Calcium carbonate tums 500mg As needed           Flecainide tambocor 50mg 1     1     Folic acid folvite 1mg 1           Fluticasone flonase 50 mcg/act nasal spray Not using       Loratadine claritin 10mg  Not taking       Metoprolol tartrate lopressor 25mg 1     1     MVI centrum variable           Nutritional supplement - ensure 1 bottle       Pantoprazole protonix 20mg Not taking         Polythyelene glycol  miralax encouraged to use       Prevident 5000 dry mouth using           Pseudoeph-doxylamine DM APAP nyquil Not taking usually           Senna-docusate 8.6-50 2       Simvastatin zocor 10mg       1     Warfarin anticoagulant coumadin 2.5mg 5 days - 1 tab; 1/2 tab m/f                              Plan:  No change in regimen    Discussed KN95 mask and fabric    [place]://[place]vaccine/find-vaccine/index.jsp  Discussed vaccination.   Tried to help him on scheduling for vaccination

## 2021-02-12 NOTE — PROGRESS NOTES
Haresh is a 70 year old who is being evaluated via a billable video visit.      How would you like to obtain your AVS? [employer]  If the video visit is dropped, the invitation should be resent by: [phone]  Will anyone else be joining your video visit? no      Video Start Time:   Video-Visit Details    Type of service:  Video Visit    Video End Time:    Originating Location (pt. Location):     Distant Location (provider location):  [facility] NEUROLOGY [facility]     Platform used for Video Visit: Lollipuff

## 2021-02-12 NOTE — TELEPHONE ENCOUNTER
Health Call Center    Phone Message    May a detailed message be left on voicemail: yes     Reason for Call: Other: Patient calling in regards to his appointment today 2/12 at 11:00 with Dr. Hill. Patient is wondering if he can switch to a virtual video visit.      Please advise and call patient back at your earliest convenience to discuss and confirm     Action Taken: Other: [facility]    Travel Screening: Not Applicable

## 2021-02-12 NOTE — PROGRESS NOTES
ANTICOAGULATION MANAGEMENT     Patient Name:  Haresh Rock  Date:  2021    ASSESSMENT /SUBJECTIVE:    Today's INR result of 2.1 is therapeutic. Goal INR of 2.0-3.0    PLAN:    Detailed message left for Haresh regarding INR result and instructed:     Warfarin Dosing Instructions: Continue your current warfarin dose 1.25mg every Monday & Thursday; 2.5mg all other days of the week.     Instructed patient to follow up no later than: 1 week  Patient to recheck with home meter    Education provided: Please call back if any changes to your diet, medications or how you've been taking warfarin, Target INR goal and significance of current INR result and Importance of therapeutic range    Instructed to call the Anticoagulation Clinic for any changes, questions or concerns. (#[phone])        Martín Rico RN      OBJECTIVE:  Recent labs: (last 7 days)     21   INR 2.1*         No question data found.  Anticoagulation Summary  As of 2021    INR goal:  2.0-3.0   TTR:  74.6 % (1 y)   INR used for dosin.1 (2021)   Warfarin maintenance plan:  1.25 mg (2.5 mg x 0.5) every Mon, Thu; 2.5 mg (2.5 mg x 1) all other days   Full warfarin instructions:  1.25 mg every Mon, Thu; 2.5 mg all other days   Weekly warfarin total:  15 mg   Plan last modified:  Hari Saavedra RN (2020)   Next INR check:     Priority:  Maintenance   Target end date:  Indefinite    Indications    Long-term (current) use of anticoagulants [Z79.01] [Z79.01]  Atrial fibrillation (H) [I48.91]  Antiphospholipid antibody syndrome (H) [D68.61]  Personal history of DVT (deep vein thrombosis) [Z86.718]  Atrial fibrillation  unspecified type (H) (Resolved) [I48.91]             Anticoagulation Episode Summary     INR check location:      Preferred lab:  EXTERNAL LAB    Send INR reminders to:  CHELSEA CHILEL    Comments:  JESSICA rodas to manage by exception      Anticoagulation Care Providers     Provider Role Specialty Phone number    Nnibitip,  Anya Solano MD [facility] [phone]

## 2021-02-15 DIAGNOSIS — D89.811 GRAFT-VERSUS-HOST DISEASE, CHRONIC (H): ICD-10-CM

## 2021-02-15 DIAGNOSIS — Z79.01 LONG TERM CURRENT USE OF ANTICOAGULANT THERAPY: ICD-10-CM

## 2021-02-15 DIAGNOSIS — C81.99 HODGKIN'S DISEASE OF EXTRANODAL OR SOLID ORGAN SITE (H): ICD-10-CM

## 2021-02-15 DIAGNOSIS — E83.110 HEREDITARY HEMOCHROMATOSIS (H): ICD-10-CM

## 2021-02-15 LAB
ALBUMIN SERPL-MCNC: 3.4 G/DL (ref 3.4–5)
ALP SERPL-CCNC: 125 U/L (ref 40–150)
ALT SERPL W P-5'-P-CCNC: 20 U/L (ref 0–70)
ANION GAP SERPL CALCULATED.3IONS-SCNC: 2 MMOL/L (ref 3–14)
AST SERPL W P-5'-P-CCNC: 24 U/L (ref 0–45)
BASOPHILS # BLD AUTO: 0 10E9/L (ref 0–0.2)
BASOPHILS NFR BLD AUTO: 0.2 %
BILIRUB SERPL-MCNC: 1 MG/DL (ref 0.2–1.3)
BUN SERPL-MCNC: 25 MG/DL (ref 7–30)
CALCIUM SERPL-MCNC: 8.9 MG/DL (ref 8.5–10.1)
CHLORIDE SERPL-SCNC: 102 MMOL/L (ref 94–109)
CO2 SERPL-SCNC: 33 MMOL/L (ref 20–32)
CREAT SERPL-MCNC: 1.18 MG/DL (ref 0.66–1.25)
CRP SERPL-MCNC: 36.5 MG/L (ref 0–8)
DIFFERENTIAL METHOD BLD: ABNORMAL
EOSINOPHIL # BLD AUTO: 0.1 10E9/L (ref 0–0.7)
EOSINOPHIL NFR BLD AUTO: 0.4 %
ERYTHROCYTE [DISTWIDTH] IN BLOOD BY AUTOMATED COUNT: 13.2 % (ref 10–15)
ERYTHROCYTE [SEDIMENTATION RATE] IN BLOOD BY WESTERGREN METHOD: 92 MM/H (ref 0–20)
FERRITIN SERPL-MCNC: 268 NG/ML (ref 26–388)
GFR SERPL CREATININE-BSD FRML MDRD: 61 ML/MIN/{1.73_M2}
GLUCOSE SERPL-MCNC: 196 MG/DL (ref 70–99)
HCT VFR BLD AUTO: 43.8 % (ref 40–53)
HGB BLD-MCNC: 15.5 G/DL (ref 13.3–17.7)
INR PPP: 2 (ref 0.86–1.14)
IRON SATN MFR SERPL: 34 % (ref 15–46)
IRON SERPL-MCNC: 78 UG/DL (ref 35–180)
LDH SERPL L TO P-CCNC: 171 U/L (ref 85–227)
LYMPHOCYTES # BLD AUTO: 2.8 10E9/L (ref 0.8–5.3)
LYMPHOCYTES NFR BLD AUTO: 17.1 %
MCH RBC QN AUTO: 32.8 PG (ref 26.5–33)
MCHC RBC AUTO-ENTMCNC: 35.4 G/DL (ref 31.5–36.5)
MCV RBC AUTO: 93 FL (ref 78–100)
MONOCYTES # BLD AUTO: 1 10E9/L (ref 0–1.3)
MONOCYTES NFR BLD AUTO: 6.2 %
NEUTROPHILS # BLD AUTO: 12.6 10E9/L (ref 1.6–8.3)
NEUTROPHILS NFR BLD AUTO: 76.1 %
PLATELET # BLD AUTO: 126 10E9/L (ref 150–450)
POTASSIUM SERPL-SCNC: 4.1 MMOL/L (ref 3.4–5.3)
PROT SERPL-MCNC: 7.7 G/DL (ref 6.8–8.8)
RBC # BLD AUTO: 4.72 10E12/L (ref 4.4–5.9)
SODIUM SERPL-SCNC: 137 MMOL/L (ref 133–144)
TIBC SERPL-MCNC: 227 UG/DL (ref 240–430)
VIT B12 SERPL-MCNC: 903 PG/ML (ref 193–986)
WBC # BLD AUTO: 16.5 10E9/L (ref 4–11)

## 2021-02-15 PROCEDURE — 80053 COMPREHEN METABOLIC PANEL: CPT | Performed by: INTERNAL MEDICINE

## 2021-02-15 PROCEDURE — 82607 VITAMIN B-12: CPT | Performed by: INTERNAL MEDICINE

## 2021-02-15 PROCEDURE — 82784 ASSAY IGA/IGD/IGG/IGM EACH: CPT | Performed by: INTERNAL MEDICINE

## 2021-02-15 PROCEDURE — 82728 ASSAY OF FERRITIN: CPT | Performed by: INTERNAL MEDICINE

## 2021-02-15 PROCEDURE — 85025 COMPLETE CBC W/AUTO DIFF WBC: CPT | Performed by: INTERNAL MEDICINE

## 2021-02-15 PROCEDURE — 99N1036 PR STATISTIC TOTAL PROTEIN: Performed by: INTERNAL MEDICINE

## 2021-02-15 PROCEDURE — 83550 IRON BINDING TEST: CPT | Performed by: INTERNAL MEDICINE

## 2021-02-15 PROCEDURE — 86140 C-REACTIVE PROTEIN: CPT | Performed by: INTERNAL MEDICINE

## 2021-02-15 PROCEDURE — 82306 VITAMIN D 25 HYDROXY: CPT | Performed by: INTERNAL MEDICINE

## 2021-02-15 PROCEDURE — 85652 RBC SED RATE AUTOMATED: CPT | Performed by: INTERNAL MEDICINE

## 2021-02-15 PROCEDURE — 84165 PROTEIN E-PHORESIS SERUM: CPT | Performed by: PATHOLOGY

## 2021-02-15 PROCEDURE — 83540 ASSAY OF IRON: CPT | Performed by: INTERNAL MEDICINE

## 2021-02-15 PROCEDURE — 36415 COLL VENOUS BLD VENIPUNCTURE: CPT | Performed by: INTERNAL MEDICINE

## 2021-02-15 PROCEDURE — 83615 LACTATE (LD) (LDH) ENZYME: CPT | Performed by: INTERNAL MEDICINE

## 2021-02-15 PROCEDURE — 85610 PROTHROMBIN TIME: CPT | Performed by: INTERNAL MEDICINE

## 2021-02-15 RX ORDER — FOLIC ACID 1 MG/1
TABLET ORAL
Qty: 90 TABLET | Refills: 3 | OUTPATIENT
Start: 2021-02-15

## 2021-02-16 ENCOUNTER — ONCOLOGY VISIT (OUTPATIENT)
Dept: TRANSPLANT | Facility: CLINIC | Age: 71
End: 2021-02-16
Attending: INTERNAL MEDICINE
Payer: COMMERCIAL

## 2021-02-16 VITALS
WEIGHT: 159.2 LBS | SYSTOLIC BLOOD PRESSURE: 126 MMHG | BODY MASS INDEX: 21.56 KG/M2 | DIASTOLIC BLOOD PRESSURE: 75 MMHG | TEMPERATURE: 96.9 F | HEIGHT: 72 IN | OXYGEN SATURATION: 97 % | HEART RATE: 65 BPM

## 2021-02-16 DIAGNOSIS — Z94.81 STATUS POST BONE MARROW TRANSPLANT (H): ICD-10-CM

## 2021-02-16 DIAGNOSIS — Z79.01 LONG TERM CURRENT USE OF ANTICOAGULANT THERAPY: ICD-10-CM

## 2021-02-16 DIAGNOSIS — R39.14 BENIGN PROSTATIC HYPERPLASIA WITH INCOMPLETE BLADDER EMPTYING: ICD-10-CM

## 2021-02-16 DIAGNOSIS — C81.99 HODGKIN'S DISEASE OF EXTRANODAL OR SOLID ORGAN SITE (H): Primary | ICD-10-CM

## 2021-02-16 DIAGNOSIS — E83.110 HEREDITARY HEMOCHROMATOSIS (H): ICD-10-CM

## 2021-02-16 DIAGNOSIS — N40.1 BENIGN PROSTATIC HYPERPLASIA WITH URINARY FREQUENCY: ICD-10-CM

## 2021-02-16 DIAGNOSIS — N40.1 BENIGN PROSTATIC HYPERPLASIA WITH INCOMPLETE BLADDER EMPTYING: ICD-10-CM

## 2021-02-16 DIAGNOSIS — R35.0 BENIGN PROSTATIC HYPERPLASIA WITH URINARY FREQUENCY: ICD-10-CM

## 2021-02-16 LAB
BASOPHILS # BLD AUTO: 0 10E9/L (ref 0–0.2)
BASOPHILS NFR BLD AUTO: 0.4 %
DAT POLY-SP REAG RBC QL: NORMAL
DEPRECATED CALCIDIOL+CALCIFEROL SERPL-MC: 18 UG/L (ref 20–75)
DIFFERENTIAL METHOD BLD: ABNORMAL
EOSINOPHIL # BLD AUTO: 0.1 10E9/L (ref 0–0.7)
EOSINOPHIL NFR BLD AUTO: 1.1 %
ERYTHROCYTE [DISTWIDTH] IN BLOOD BY AUTOMATED COUNT: 12.8 % (ref 10–15)
HCT VFR BLD AUTO: 43.9 % (ref 40–53)
HGB BLD-MCNC: 15 G/DL (ref 13.3–17.7)
IGG SERPL-MCNC: 1274 MG/DL (ref 610–1616)
IMM GRANULOCYTES # BLD: 0 10E9/L (ref 0–0.4)
IMM GRANULOCYTES NFR BLD: 0.4 %
LYMPHOCYTES # BLD AUTO: 1.9 10E9/L (ref 0.8–5.3)
LYMPHOCYTES NFR BLD AUTO: 20 %
MCH RBC QN AUTO: 32.5 PG (ref 26.5–33)
MCHC RBC AUTO-ENTMCNC: 34.2 G/DL (ref 31.5–36.5)
MCV RBC AUTO: 95 FL (ref 78–100)
MONOCYTES # BLD AUTO: 0.7 10E9/L (ref 0–1.3)
MONOCYTES NFR BLD AUTO: 7.2 %
NEUTROPHILS # BLD AUTO: 6.6 10E9/L (ref 1.6–8.3)
NEUTROPHILS NFR BLD AUTO: 70.9 %
NRBC # BLD AUTO: 0 10*3/UL
NRBC BLD AUTO-RTO: 0 /100
PLATELET # BLD AUTO: 135 10E9/L (ref 150–450)
PSA SERPL-MCNC: 2.29 UG/L (ref 0–4)
RBC # BLD AUTO: 4.62 10E12/L (ref 4.4–5.9)
RETICS # AUTO: 84.5 10E9/L (ref 25–95)
RETICS/RBC NFR AUTO: 1.8 % (ref 0.5–2)
WBC # BLD AUTO: 9.3 10E9/L (ref 4–11)

## 2021-02-16 PROCEDURE — G0463 HOSPITAL OUTPT CLINIC VISIT: HCPCS

## 2021-02-16 PROCEDURE — 86769 SARS-COV-2 COVID-19 ANTIBODY: CPT | Performed by: INTERNAL MEDICINE

## 2021-02-16 PROCEDURE — 99215 OFFICE O/P EST HI 40 MIN: CPT | Performed by: INTERNAL MEDICINE

## 2021-02-16 PROCEDURE — 84153 ASSAY OF PSA TOTAL: CPT | Performed by: INTERNAL MEDICINE

## 2021-02-16 PROCEDURE — 85045 AUTOMATED RETICULOCYTE COUNT: CPT | Performed by: INTERNAL MEDICINE

## 2021-02-16 PROCEDURE — 86038 ANTINUCLEAR ANTIBODIES: CPT | Performed by: INTERNAL MEDICINE

## 2021-02-16 PROCEDURE — 85025 COMPLETE CBC W/AUTO DIFF WBC: CPT | Performed by: INTERNAL MEDICINE

## 2021-02-16 PROCEDURE — 999N001086 HC STATISTIC MORPHOLOGY W/INTERP HEMEPATH TC 85060: Performed by: INTERNAL MEDICINE

## 2021-02-16 PROCEDURE — 86160 COMPLEMENT ANTIGEN: CPT | Performed by: INTERNAL MEDICINE

## 2021-02-16 PROCEDURE — 86880 COOMBS TEST DIRECT: CPT | Performed by: INTERNAL MEDICINE

## 2021-02-16 PROCEDURE — 87040 BLOOD CULTURE FOR BACTERIA: CPT | Performed by: INTERNAL MEDICINE

## 2021-02-16 PROCEDURE — 86162 COMPLEMENT TOTAL (CH50): CPT | Performed by: INTERNAL MEDICINE

## 2021-02-16 PROCEDURE — 36415 COLL VENOUS BLD VENIPUNCTURE: CPT

## 2021-02-16 ASSESSMENT — PAIN SCALES - GENERAL: PAINLEVEL: NO PAIN (0)

## 2021-02-16 ASSESSMENT — MIFFLIN-ST. JEOR: SCORE: 1510.13

## 2021-02-16 NOTE — NURSING NOTE
Oncology Rooming Note    February 16, 2021 10:08 AM   Haresh Rock is a 70 year old male who presents for:    Chief Complaint   Patient presents with     Oncology Clinic Visit     RETURN;      Initial Vitals: /75 (BP Location: Right arm, Patient Position: Sitting, Cuff Size: Adult Regular)   Pulse 65   Temp 96.9  F (36.1  C) (Oral)   Ht 1.829 m (6')   Wt 72.2 kg (159 lb 3.2 oz)   SpO2 97%   BMI 21.59 kg/m   Estimated body mass index is 21.59 kg/m  as calculated from the following:    Height as of this encounter: 1.829 m (6').    Weight as of this encounter: 72.2 kg (159 lb 3.2 oz). Body surface area is 1.92 meters squared.  No Pain (0) Comment: Data Unavailable   No LMP for male patient.  Allergies reviewed: Yes  Medications reviewed: Yes    Medications: Medication refills not needed today.  Pharmacy name entered into EPIC:    [facility] #[id number] - [place] - [address] [facility] - [place], [place] - [address] AT PORTAL TO REGISTERED [facility] SITES  [facility][id number] - [facility], [place] - [address] AT [address] [address]    Clinical concerns: Patient would like to review lab results - concerned about weight loss - See distress screen      mAanda Laureano CMA

## 2021-02-16 NOTE — PROGRESS NOTES
Labs drawn in clinic. Pt tolerated fine. Labs sent to first floor lab for processing @ 11:15am.    Joellen Mckeon CMA on 2/16/2021 at 11:26 AM

## 2021-02-16 NOTE — PROGRESS NOTES
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  When I saw Haresh back in July, he was having trouble swallowing.  He had a video esophagram done and evaluation by speech therapy.  He was found to have difficulty swallowing with aspiration.  There was a question of whether or not he had Parkinson's.  He was seen by Dr. Ángel Hill.  He was found to have a bit of a shuffling gait.  He has no tremor though.  He has noticed that his handwriting has gotten smaller.  He did have a dopaminergic brain scan which showed a decrease in dopaminergic staining consistent with early Parkinson's.  He has not been given any treatment.  He has been eating regular food, but he avoids certain foods at that may stick in his upper esophagus.  He has no trouble with jello or applesauce.  He states if he chews chicken or fish he can swallow that well.  He has lost 40 pounds.  He is quite concerned about the weight loss and is wondering whether or not there might be another cause for the weight loss.  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     INTERVAL HISTORY  Continues to lose weight now bartolome about 50lb. Not eating much.Has difficulty with swallowing  Fluids but can eat mashed potatoes. Has no tremor.Had episode of shaking chills with constipation about a month ago. No diarrhe. Has dry mouth Gets up to urinate 3-4x/night. Has no sweats Has some hip pain. No appetite. No cough no chest pain no A fib. No covid sx. No fevers.    PAST MEDICAL HISTORY:  See my note from 01/2021    SOCIAL HISTORY:  See my  note from 01/2021     FAMILY HISTORY:  See my note from  01/2021     REVIEW OF SYSTEMS:  A 12-point review of systems is done and is negative, except as in the HPI.   PHYSICAL EXAMINATION:   he is an alert gentleman, verbal, in no acute distress.   He looks gaunt with weight loss /75 (BP Location: Right arm, Patient Position: Sitting, Cuff Size: Adult Regular)   Pulse 65   Temp 96.9  F (36.1  C) (Oral)   Ht 1.829 m (6')   Wt 72.2 kg (159 lb 3.2 oz)   SpO2 97%   BMI 21.59 kg/m      EYES:  Grossly normal to inspection, no discharge, erythema or icterus.   SKIN:  Visible skin is clear.  No significant rash or abnormal pigmentation.   NEUROLOGIC:  Cranial nerves seem to be intact.  Mentation and speech is appropriate for age.Gait ok CN2-12 OK No cogwheel DTRS +1 equal and symmetric No cerebellar signs Romber neg   PSYCHIATRIC:  Mentation appears normal.  He does not seem anxious today.   LYMPH no cervical, axillary or inguinal nodes  COR NSR no s3 s4 or M  ABDOMEN No organomegaly no ascites  EXT No edema    Results for PAMELA GRANADOS (MRN [id number]) as of 2/16/2021 10:48   Ref. Range 2/15/2021 10:14   Sodium Latest Ref Range: 133 - 144 mmol/L 137   Potassium Latest Ref Range: 3.4 - 5.3 mmol/L 4.1   Chloride Latest Ref Range: 94 - 109 mmol/L 102   Carbon Dioxide Latest Ref Range: 20 - 32 mmol/L 33 (H)   Urea Nitrogen Latest Ref Range: 7 - 30 mg/dL 25   Creatinine Latest Ref Range: 0.66 - 1.25 mg/dL 1.18   GFR Estimate Latest Ref Range: >60 mL/min/1.73_m2 61   GFR Estimate If Black Latest Ref Range: >60 mL/min/1.73_m2 71   Calcium Latest Ref Range: 8.5 - 10.1 mg/dL 8.9   Anion Gap Latest Ref Range: 3 - 14 mmol/L 2 (L)   Albumin Latest Ref Range: 3.4 - 5.0 g/dL 3.4   Protein Total Latest Ref Range: 6.8 - 8.8 g/dL 7.7   Bilirubin Total Latest Ref Range: 0.2 - 1.3 mg/dL 1.0   Alkaline Phosphatase Latest Ref Range: 40 - 150 U/L 125   ALT Latest Ref Range: 0 - 70 U/L 20   AST Latest Ref Range: 0 - 45 U/L 24   CRP  Inflammation Latest Ref Range: 0.0 - 8.0 mg/L 36.5 (H)   Ferritin Latest Ref Range: 26 - 388 ng/mL 268   Iron Latest Ref Range: 35 - 180 ug/dL 78   Iron Binding Cap Latest Ref Range: 240 - 430 ug/dL 227 (L)   Iron Saturation Index Latest Ref Range: 15 - 46 % 34   Lactate Dehydrogenase Latest Ref Range: 85 - 227 U/L 171   Vitamin B12 Latest Ref Range: 193 - 986 pg/mL 903   Vitamin D Deficiency screening Latest Ref Range: 20 - 75 ug/L 18 (L)   Glucose Latest Ref Range: 70 - 99 mg/dL 196 (H)   WBC Latest Ref Range: 4.0 - 11.0 10e9/L 16.5 (H)   Hemoglobin Latest Ref Range: 13.3 - 17.7 g/dL 15.5   Hematocrit Latest Ref Range: 40.0 - 53.0 % 43.8   Platelet Count Latest Ref Range: 150 - 450 10e9/L 126 (L)   RBC Count Latest Ref Range: 4.4 - 5.9 10e12/L 4.72   MCV Latest Ref Range: 78 - 100 fl 93   MCH Latest Ref Range: 26.5 - 33.0 pg 32.8   MCHC Latest Ref Range: 31.5 - 36.5 g/dL 35.4   RDW Latest Ref Range: 10.0 - 15.0 % 13.2   Diff Method Unknown Automated Method   % Neutrophils Latest Units: % 76.1   % Lymphocytes Latest Units: % 17.1   % Monocytes Latest Units: % 6.2   % Eosinophils Latest Units: % 0.4   % Basophils Latest Units: % 0.2   Absolute Neutrophil Latest Ref Range: 1.6 - 8.3 10e9/L 12.6 (H)   Absolute Lymphocytes Latest Ref Range: 0.8 - 5.3 10e9/L 2.8   Absolute Monocytes Latest Ref Range: 0.0 - 1.3 10e9/L 1.0   Absolute Eosinophils Latest Ref Range: 0.0 - 0.7 10e9/L 0.1   Absolute Basophils Latest Ref Range: 0.0 - 0.2 10e9/L 0.0   Sed Rate Latest Ref Range: 0 - 20 mm/h 92 (H)   INR Latest Ref Range: 0.86 - 1.14  2.00 (H)   Albumin Fraction Latest Ref Range: 3.7 - 5.1 g/dL PENDING   Alpha 1 Fraction Latest Ref Range: 0.2 - 0.4 g/dL PENDING   Alpha 2 Fraction Latest Ref Range: 0.5 - 0.9 g/dL PENDING   Beta Fraction Latest Ref Range: 0.6 - 1.0 g/dL PENDING   ELP Interpretation: Unknown PENDING   Gamma Fraction Latest Ref Range: 0.7 - 1.6 g/dL PENDING   Monoclonal Peak Latest Ref Range: 0.0 g/dL PENDING      ASSESSMENT:     1.  Myeloproliferative syndrome/MDS. JAL 2 positive  2.  Status post non-myeloablative allo-sib peripheral blood stem cell transplant.   3.  History of chronic tkkyo-jgbvzr-pnun disease.   4.  History of Hodgkin's disease.   5.  History of cardiac arrhythmias, SVT and V tach.   6.  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   7.  History of cirrhosis.   8.  History of pulmonary emboli.   9.  History of elevated hemoglobin A1c.   10.  Thyroid nodule.    11.  Cholelithiasis.     12.  Diverticulosis.   13.  Anticoagulation.     14.  Status post bilateral knee replacement.     15.  Aortic stenosis  16. 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  17. Seborrheic keratosis of the back.  18 Weight loss  19.Dysphagia  20. Parkinson'  21 Anorexia  22. BPH sx    ASSESSMENT:  It is really tragic with Haresh developing Parkinson's.  I suspect that with the difficulty swallowing and now with the other symptoms including handwriting and a shuffling gait, I am just not sure whether or not he needs any treatment.  He will be followed by Dr. Ángel Hill.  I would like to be sure that there is not any other cause for his weight loss.  He has had chronic cpsot-wogqtu-nbni disease.  He has had cirrhosis.  He has had problems with Hodgkin's disease.  A CT scan of the chest, abdomen and pelvis last fall did  show some abdominal aortic lymphadenopathy.He ahs elevated WBC CRP and sed rate.  He does not have any fevers or sweats or other processes that might be considered causative of the weight loss.  He does not have any diarrhea.  I wonder if this is recurrent Hodgkins? Will get PET CT.Could this by CGVHD? Will get CHRISTIAN C3C4 CH50. Will get blood culture with hx of shaking chills.Will check CMV and EBV PCR.Has nocturia will check PSA Could this be chronic COVID? Will get Ab juan for covid He has not had vaccine    RTC in 1 months with labs    I spent a  total of 40  minutes face to face with Haresh Rock during today's office visit. Over 50% of this time was spent counseling the patient and coordinating the care regarding weight loss parkinson's and dysphagia    Augusto Miller MD   [phone]

## 2021-02-16 NOTE — LETTER
2/16/2021         RE: Haresh Rock  [address] [place] [address]        Dear Colleague,    Thank you for referring your patient, Haresh Rock, to the [facility]LOOD [facility]ANSPL[facility] [place]. Please see a copy of my visit note below.        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  When I saw Haresh back in July, he was having trouble swallowing.  He had a video esophagram done and evaluation by speech therapy.  He was found to have difficulty swallowing with aspiration.  There was a question of whether or not he had Parkinson's.  He was seen by Dr. Ángel Hill.  He was found to have a bit of a shuffling gait.  He has no tremor though.  He has noticed that his handwriting has gotten smaller.  He did have a dopaminergic brain scan which showed a decrease in dopaminergic staining consistent with early Parkinson's.  He has not been given any treatment.  He has been eating regular food, but he avoids certain foods at that may stick in his upper esophagus.  He has no trouble with jello or applesauce.  He states if he chews chicken or fish he can swallow that well.  He has lost 40 pounds.  He is quite concerned about the weight loss and is wondering whether or not there might be another cause for the weight loss.  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     INTERVAL HISTORY  Continues to lose weight now bartolome about 50lb. Not eating much.Has difficulty with swallowing  Fluids but can eat mashed potatoes. Has no tremor.Had episode of shaking  chills with constipation about a month ago. No diarrhe. Has dry mouth Gets up to urinate 3-4x/night. Has no sweats Has some hip pain. No appetite. No cough no chest pain no A fib. No covid sx. No fevers.    PAST MEDICAL HISTORY:  See my note from 01/2021    SOCIAL HISTORY:  See my note from 01/2021     FAMILY HISTORY:  See my note from  01/2021     REVIEW OF SYSTEMS:  A 12-point review of systems is done and is negative, except as in the HPI.   PHYSICAL EXAMINATION:   he is an alert gentleman, verbal, in no acute distress.   He looks gaunt with weight loss /75 (BP Location: Right arm, Patient Position: Sitting, Cuff Size: Adult Regular)   Pulse 65   Temp 96.9  F (36.1  C) (Oral)   Ht 1.829 m (6')   Wt 72.2 kg (159 lb 3.2 oz)   SpO2 97%   BMI 21.59 kg/m      EYES:  Grossly normal to inspection, no discharge, erythema or icterus.   SKIN:  Visible skin is clear.  No significant rash or abnormal pigmentation.   NEUROLOGIC:  Cranial nerves seem to be intact.  Mentation and speech is appropriate for age.Gait ok CN2-12 OK No cogwheel DTRS +1 equal and symmetric No cerebellar signs Romber neg   PSYCHIATRIC:  Mentation appears normal.  He does not seem anxious today.   LYMPH no cervical, axillary or inguinal nodes  COR NSR no s3 s4 or M  ABDOMEN No organomegaly no ascites  EXT No edema    Results for PAMELA GRANADOS (MRN [id number]) as of 2/16/2021 10:48   Ref. Range 2/15/2021 10:14   Sodium Latest Ref Range: 133 - 144 mmol/L 137   Potassium Latest Ref Range: 3.4 - 5.3 mmol/L 4.1   Chloride Latest Ref Range: 94 - 109 mmol/L 102   Carbon Dioxide Latest Ref Range: 20 - 32 mmol/L 33 (H)   Urea Nitrogen Latest Ref Range: 7 - 30 mg/dL 25   Creatinine Latest Ref Range: 0.66 - 1.25 mg/dL 1.18   GFR Estimate Latest Ref Range: >60 mL/min/1.73_m2 61   GFR Estimate If Black Latest Ref Range: >60 mL/min/1.73_m2 71   Calcium Latest Ref Range: 8.5 - 10.1 mg/dL 8.9   Anion Gap Latest Ref Range: 3 - 14 mmol/L 2 (L)   Albumin  Latest Ref Range: 3.4 - 5.0 g/dL 3.4   Protein Total Latest Ref Range: 6.8 - 8.8 g/dL 7.7   Bilirubin Total Latest Ref Range: 0.2 - 1.3 mg/dL 1.0   Alkaline Phosphatase Latest Ref Range: 40 - 150 U/L 125   ALT Latest Ref Range: 0 - 70 U/L 20   AST Latest Ref Range: 0 - 45 U/L 24   CRP Inflammation Latest Ref Range: 0.0 - 8.0 mg/L 36.5 (H)   Ferritin Latest Ref Range: 26 - 388 ng/mL 268   Iron Latest Ref Range: 35 - 180 ug/dL 78   Iron Binding Cap Latest Ref Range: 240 - 430 ug/dL 227 (L)   Iron Saturation Index Latest Ref Range: 15 - 46 % 34   Lactate Dehydrogenase Latest Ref Range: 85 - 227 U/L 171   Vitamin B12 Latest Ref Range: 193 - 986 pg/mL 903   Vitamin D Deficiency screening Latest Ref Range: 20 - 75 ug/L 18 (L)   Glucose Latest Ref Range: 70 - 99 mg/dL 196 (H)   WBC Latest Ref Range: 4.0 - 11.0 10e9/L 16.5 (H)   Hemoglobin Latest Ref Range: 13.3 - 17.7 g/dL 15.5   Hematocrit Latest Ref Range: 40.0 - 53.0 % 43.8   Platelet Count Latest Ref Range: 150 - 450 10e9/L 126 (L)   RBC Count Latest Ref Range: 4.4 - 5.9 10e12/L 4.72   MCV Latest Ref Range: 78 - 100 fl 93   MCH Latest Ref Range: 26.5 - 33.0 pg 32.8   MCHC Latest Ref Range: 31.5 - 36.5 g/dL 35.4   RDW Latest Ref Range: 10.0 - 15.0 % 13.2   Diff Method Unknown Automated Method   % Neutrophils Latest Units: % 76.1   % Lymphocytes Latest Units: % 17.1   % Monocytes Latest Units: % 6.2   % Eosinophils Latest Units: % 0.4   % Basophils Latest Units: % 0.2   Absolute Neutrophil Latest Ref Range: 1.6 - 8.3 10e9/L 12.6 (H)   Absolute Lymphocytes Latest Ref Range: 0.8 - 5.3 10e9/L 2.8   Absolute Monocytes Latest Ref Range: 0.0 - 1.3 10e9/L 1.0   Absolute Eosinophils Latest Ref Range: 0.0 - 0.7 10e9/L 0.1   Absolute Basophils Latest Ref Range: 0.0 - 0.2 10e9/L 0.0   Sed Rate Latest Ref Range: 0 - 20 mm/h 92 (H)   INR Latest Ref Range: 0.86 - 1.14  2.00 (H)   Albumin Fraction Latest Ref Range: 3.7 - 5.1 g/dL PENDING   Alpha 1 Fraction Latest Ref Range: 0.2 - 0.4  g/dL PENDING   Alpha 2 Fraction Latest Ref Range: 0.5 - 0.9 g/dL PENDING   Beta Fraction Latest Ref Range: 0.6 - 1.0 g/dL PENDING   ELP Interpretation: Unknown PENDING   Gamma Fraction Latest Ref Range: 0.7 - 1.6 g/dL PENDING   Monoclonal Peak Latest Ref Range: 0.0 g/dL PENDING     ASSESSMENT:     1.  Myeloproliferative syndrome/MDS. JAL 2 positive  2.  Status post non-myeloablative allo-sib peripheral blood stem cell transplant.   3.  History of chronic mdkfr-eompiu-sovf disease.   4.  History of Hodgkin's disease.   5.  History of cardiac arrhythmias, SVT and V tach.   6.  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   7.  History of cirrhosis.   8.  History of pulmonary emboli.   9.  History of elevated hemoglobin A1c.   10.  Thyroid nodule.    11.  Cholelithiasis.     12.  Diverticulosis.   13.  Anticoagulation.     14.  Status post bilateral knee replacement.     15.  Aortic stenosis  16. 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  17. Seborrheic keratosis of the back.  18 Weight loss  19.Dysphagia  20. Parkinson'  21 Anorexia  22. BPH sx    ASSESSMENT:  It is really tragic with Haresh developing Parkinson's.  I suspect that with the difficulty swallowing and now with the other symptoms including handwriting and a shuffling gait, I am just not sure whether or not he needs any treatment.  He will be followed by Dr. Ángel Hill.  I would like to be sure that there is not any other cause for his weight loss.  He has had chronic yzxki-zqielq-dxwm disease.  He has had cirrhosis.  He has had problems with Hodgkin's disease.  A CT scan of the chest, abdomen and pelvis last fall did  show some abdominal aortic lymphadenopathy.He ahs elevated WBC CRP and sed rate.  He does not have any fevers or sweats or other processes that might be considered causative of the weight loss.  He does not have any diarrhea.  I wonder if this is recurrent Hodgkins? Will get  PET CT.Could this by CGVHD? Will get CHRISTIAN C3C4 CH50. Will get blood culture with hx of shaking chills.Will check CMV and EBV PCR.Has nocturia will check PSA Could this be chronic COVID? Will get Ab juan for covid He has not had vaccine    RTC in 1 months with labs    I spent a total of 40  minutes face to face with Haresh Rock during today's office visit. Over 50% of this time was spent counseling the patient and coordinating the care regarding weight loss parkinson's and dysphagia    Augusto Miller MD   [phone]                Labs drawn in clinic. Pt tolerated fine. Labs sent to first floor lab for processing @ 11:15am.    Joellen Mckeon CMA on 2/16/2021 at 11:26 AM        Again, thank you for allowing me to participate in the care of your patient.        Sincerely,        Augusto Miller MD

## 2021-02-17 ENCOUNTER — TELEPHONE (OUTPATIENT)
Dept: TRANSPLANT | Facility: CLINIC | Age: 71
End: 2021-02-17

## 2021-02-17 LAB
ALBUMIN SERPL ELPH-MCNC: 3.6 G/DL (ref 3.7–5.1)
ALPHA1 GLOB SERPL ELPH-MCNC: 0.4 G/DL (ref 0.2–0.4)
ALPHA2 GLOB SERPL ELPH-MCNC: 0.8 G/DL (ref 0.5–0.9)
ANA SER QL IF: NEGATIVE
B-GLOBULIN SERPL ELPH-MCNC: 0.9 G/DL (ref 0.6–1)
C4 SERPL-MCNC: 22 MG/DL (ref 13–39)
CH50 SERPL-ACNC: 52.1 U/ML (ref 38.7–89.9)
CMV DNA SPEC NAA+PROBE-ACNC: NORMAL [IU]/ML
CMV DNA SPEC NAA+PROBE-LOG#: NORMAL {LOG_IU}/ML
COPATH REPORT: NORMAL
GAMMA GLOB SERPL ELPH-MCNC: 1.4 G/DL (ref 0.7–1.6)
LAB TEST METHOD: NORMAL
M PROTEIN SERPL ELPH-MCNC: 0.2 G/DL
PROT PATTERN SERPL ELPH-IMP: ABNORMAL
SARS-COV-2 AB PNL SERPL IA: NEGATIVE
SPECIMEN SOURCE: NORMAL

## 2021-02-17 RX ORDER — WARFARIN SODIUM 2.5 MG/1
TABLET ORAL
Qty: 90 TABLET | Refills: 0 | Status: SHIPPED | OUTPATIENT
Start: 2021-02-17 | End: 2021-08-21

## 2021-02-18 DIAGNOSIS — D89.811 GRAFT-VERSUS-HOST DISEASE, CHRONIC (H): ICD-10-CM

## 2021-02-18 DIAGNOSIS — C81.99 HODGKIN'S DISEASE OF EXTRANODAL OR SOLID ORGAN SITE (H): ICD-10-CM

## 2021-02-18 LAB — C3 SERPL-MCNC: 115 MG/DL (ref 81–157)

## 2021-02-19 ENCOUNTER — TRANSFERRED RECORDS (OUTPATIENT)
Dept: HEALTH INFORMATION MANAGEMENT | Facility: CLINIC | Age: 71
End: 2021-02-19

## 2021-02-19 ENCOUNTER — ANTICOAGULATION THERAPY VISIT (OUTPATIENT)
Dept: FAMILY MEDICINE | Facility: CLINIC | Age: 71
End: 2021-02-19

## 2021-02-19 DIAGNOSIS — D68.61 ANTIPHOSPHOLIPID ANTIBODY SYNDROME (H): ICD-10-CM

## 2021-02-19 DIAGNOSIS — I48.0 PAROXYSMAL ATRIAL FIBRILLATION (H): ICD-10-CM

## 2021-02-19 DIAGNOSIS — Z79.01 LONG TERM CURRENT USE OF ANTICOAGULANT THERAPY: ICD-10-CM

## 2021-02-19 DIAGNOSIS — Z86.718 PERSONAL HISTORY OF DVT (DEEP VEIN THROMBOSIS): ICD-10-CM

## 2021-02-19 LAB — INR PPP: 1.8 (ref 0.9–1.1)

## 2021-02-19 RX ORDER — PANTOPRAZOLE SODIUM 20 MG/1
TABLET, DELAYED RELEASE ORAL
Qty: 90 TABLET | Refills: 3 | OUTPATIENT
Start: 2021-02-19

## 2021-02-19 NOTE — TELEPHONE ENCOUNTER
Patient was called on 2/19 and stated that he is no longer is taking Pantroprazole and does not need a refill. He believes this was automated by the pharmacy.

## 2021-02-19 NOTE — PROGRESS NOTES
ANTICOAGULATION MANAGEMENT     Patient Name:  Haresh Rock  Date:  2021    ASSESSMENT /SUBJECTIVE:    Today's INR result of 1.8 is subtherapeutic. Goal INR of 2.0-3.0      Warfarin dose taken: Warfarin taken as instructed    Diet: No new diet changes affecting INR    Medication changes/ interactions: No new medications/supplements affecting INR    Previous INR: Therapeutic     S/S of bleeding or thromboembolism: No    New injury or illness: No    Upcoming surgery, procedure or cardioversion: No    Additional findings: None      PLAN:    Telephone call with Haresh regarding INR result and instructed:     Warfarin Dosing Instructions: Change your warfarin dose to 1..25 mg Thu; 2.5 mg all other days  . (8 % change)    Instructed patient to follow up no later than: 1 week  Patient to recheck with home meter    Education provided: Monitoring for bleeding signs and symptoms and Monitoring for clotting signs and symptoms      Haresh verbalizes understanding and agrees to warfarin dosing plan.    Instructed to call the Anticoagulation Clinic for any changes, questions or concerns. (#[phone])        Allyn Doe RN      OBJECTIVE:  Recent labs: (last 7 days)     02/15/21  1014 21   INR 2.00* 1.8*         No question data found.  Anticoagulation Summary  As of 2021    INR goal:  2.0-3.0   TTR:  73.6 % (1 y)   INR used for dosin.8 (2021)   Warfarin maintenance plan:  1.25 mg (2.5 mg x 0.5) every Thu; 2.5 mg (2.5 mg x 1) all other days   Full warfarin instructions:  1.25 mg every Thu; 2.5 mg all other days   Weekly warfarin total:  16.25 mg   Plan last modified:  Allyn Doe RN (2021)   Next INR check:  2021   Priority:  Maintenance   Target end date:  Indefinite    Indications    Long-term (current) use of anticoagulants [Z79.01] [Z79.01]  Atrial fibrillation (H) [I48.91]  Antiphospholipid antibody syndrome (H) [D68.61]  Personal history of DVT (deep vein thrombosis)  [Z86.718]  Atrial fibrillation  unspecified type (H) (Resolved) [I48.91]             Anticoagulation Episode Summary     INR check location:      Preferred lab:  EXTERNAL LAB    Send INR reminders to:  CHELSEA CHILEL    Comments:  JESSICA okay to manage by exception      Anticoagulation Care Providers     Provider Role Specialty Phone number    Anya Nowak MD Referring Family Medicine [phone]

## 2021-02-22 ENCOUNTER — HOSPITAL ENCOUNTER (OUTPATIENT)
Dept: SPEECH THERAPY | Facility: CLINIC | Age: 71
Setting detail: THERAPIES SERIES
End: 2021-02-22
Attending: OTOLARYNGOLOGY
Payer: COMMERCIAL

## 2021-02-22 LAB
BACTERIA SPEC CULT: NO GROWTH
SPECIMEN SOURCE: NORMAL

## 2021-02-22 PROCEDURE — 92526 ORAL FUNCTION THERAPY: CPT | Mod: GN,GT | Performed by: SPEECH-LANGUAGE PATHOLOGIST

## 2021-02-24 ENCOUNTER — HOSPITAL ENCOUNTER (OUTPATIENT)
Dept: SPEECH THERAPY | Facility: CLINIC | Age: 71
Setting detail: THERAPIES SERIES
End: 2021-02-24
Attending: OTOLARYNGOLOGY
Payer: COMMERCIAL

## 2021-02-24 PROCEDURE — 92610 EVALUATE SWALLOWING FUNCTION: CPT | Mod: GN,95 | Performed by: SPEECH-LANGUAGE PATHOLOGIST

## 2021-02-26 ENCOUNTER — HOSPITAL ENCOUNTER (OUTPATIENT)
Dept: PET IMAGING | Facility: CLINIC | Age: 71
End: 2021-02-26
Attending: INTERNAL MEDICINE
Payer: COMMERCIAL

## 2021-02-26 DIAGNOSIS — C81.99 HODGKIN'S DISEASE OF EXTRANODAL OR SOLID ORGAN SITE (H): ICD-10-CM

## 2021-02-26 PROCEDURE — A9552 F18 FDG: HCPCS | Performed by: INTERNAL MEDICINE

## 2021-02-26 PROCEDURE — 343N000001 HC RX 343: Performed by: INTERNAL MEDICINE

## 2021-02-26 PROCEDURE — 74177 CT ABD & PELVIS W/CONTRAST: CPT | Mod: 26 | Performed by: RADIOLOGY

## 2021-02-26 PROCEDURE — 250N000011 HC RX IP 250 OP 636: Performed by: INTERNAL MEDICINE

## 2021-02-26 PROCEDURE — 71260 CT THORAX DX C+: CPT | Mod: 26 | Performed by: RADIOLOGY

## 2021-02-26 PROCEDURE — 71260 CT THORAX DX C+: CPT

## 2021-02-26 PROCEDURE — 70491 CT SOFT TISSUE NECK W/DYE: CPT | Mod: 26 | Performed by: RADIOLOGY

## 2021-02-26 PROCEDURE — 70491 CT SOFT TISSUE NECK W/DYE: CPT

## 2021-02-26 PROCEDURE — 78816 PET IMAGE W/CT FULL BODY: CPT | Mod: PS

## 2021-02-26 PROCEDURE — 78816 PET IMAGE W/CT FULL BODY: CPT | Mod: 26 | Performed by: RADIOLOGY

## 2021-02-26 RX ORDER — IOPAMIDOL 755 MG/ML
45-135 INJECTION, SOLUTION INTRAVASCULAR ONCE
Status: COMPLETED | OUTPATIENT
Start: 2021-02-26 | End: 2021-02-26

## 2021-02-26 RX ADMIN — FLUDEOXYGLUCOSE F-18 10.07 MCI.: 500 INJECTION, SOLUTION INTRAVENOUS at 14:20

## 2021-02-26 RX ADMIN — IOPAMIDOL 97 ML: 755 INJECTION, SOLUTION INTRAVENOUS at 14:24

## 2021-02-27 ENCOUNTER — TELEPHONE (OUTPATIENT)
Dept: ONCOLOGY | Facility: CLINIC | Age: 71
End: 2021-02-27

## 2021-02-27 ENCOUNTER — TRANSFERRED RECORDS (OUTPATIENT)
Dept: HEALTH INFORMATION MANAGEMENT | Facility: CLINIC | Age: 71
End: 2021-02-27

## 2021-02-27 DIAGNOSIS — Z85.71 HISTORY OF HODGKIN'S LYMPHOMA: ICD-10-CM

## 2021-02-27 DIAGNOSIS — Z94.81 STATUS POST BONE MARROW TRANSPLANT (H): Primary | ICD-10-CM

## 2021-02-27 LAB — INR PPP: 2 (ref 0.9–1.1)

## 2021-02-27 NOTE — TELEPHONE ENCOUNTER
Haresh had extensive workup.with blood work PSA ok CBC OK Has CRP elevated. PET scan showed inflammation in perianal area but NO HODGKINs.. Has had some discomfort muriel with constipation ? Inflamed hemorrhoids? Will get flex sigmoidoscopy ordered.Has so ground glass  In lung bases likely due to aspiration No real sx from this occ cough when eating Will not give antibiotic muriel on warfarin No fever. I will contact Dr Hill to discuss wt loss and parkinsons

## 2021-03-01 ENCOUNTER — HOSPITAL ENCOUNTER (OUTPATIENT)
Dept: SPEECH THERAPY | Facility: CLINIC | Age: 71
Setting detail: THERAPIES SERIES
End: 2021-03-01
Attending: OTOLARYNGOLOGY
Payer: COMMERCIAL

## 2021-03-01 ENCOUNTER — IMMUNIZATION (OUTPATIENT)
Dept: NURSING | Facility: CLINIC | Age: 71
End: 2021-03-01
Payer: COMMERCIAL

## 2021-03-01 ENCOUNTER — ANTICOAGULATION THERAPY VISIT (OUTPATIENT)
Dept: FAMILY MEDICINE | Facility: CLINIC | Age: 71
End: 2021-03-01

## 2021-03-01 DIAGNOSIS — R13.10 DYSPHAGIA, UNSPECIFIED TYPE: Primary | ICD-10-CM

## 2021-03-01 DIAGNOSIS — Z79.01 LONG TERM CURRENT USE OF ANTICOAGULANT THERAPY: ICD-10-CM

## 2021-03-01 DIAGNOSIS — Z86.718 PERSONAL HISTORY OF DVT (DEEP VEIN THROMBOSIS): ICD-10-CM

## 2021-03-01 DIAGNOSIS — I48.0 PAROXYSMAL ATRIAL FIBRILLATION (H): ICD-10-CM

## 2021-03-01 DIAGNOSIS — D68.61 ANTIPHOSPHOLIPID ANTIBODY SYNDROME (H): ICD-10-CM

## 2021-03-01 PROCEDURE — 91301 PR COVID VAC MODERNA 100 MCG/0.5 ML IM: CPT

## 2021-03-01 PROCEDURE — 92507 TX SP LANG VOICE COMM INDIV: CPT | Mod: GN,GT | Performed by: SPEECH-LANGUAGE PATHOLOGIST

## 2021-03-01 PROCEDURE — 0011A PR COVID VAC MODERNA 100 MCG/0.5 ML IM: CPT

## 2021-03-01 NOTE — PROGRESS NOTES
ANTICOAGULATION MANAGEMENT     Patient Name:  Haresh Rock  Date:  3/1/2021    ASSESSMENT /SUBJECTIVE:    Today's INR result of 2.0 is therapeutic. Goal INR of 2.0-3.0      Warfarin dose taken: Warfarin taken as instructed    Diet: No new diet changes affecting INR    Medication changes/ interactions: No new medications/supplements affecting INR    Previous INR: Subtherapeutic     S/S of bleeding or thromboembolism: No    New injury or illness: No    Upcoming surgery, procedure or cardioversion: No    Additional findings: None      PLAN:    Telephone call with Haresh regarding INR result and instructed:     Warfarin Dosing Instructions: Continue your current warfarin dose 1.25 mg TH; 2.5 mg ROW    Instructed patient to follow up no later than: 1 week  Patient to recheck with home meter    Education provided: None required      Haresh verbalizes understanding and agrees to warfarin dosing plan.    Instructed to call the Anticoagulation Clinic for any changes, questions or concerns. (#[phone])        Joellen Molina RN      OBJECTIVE:  Recent labs: (last 7 days)     21   INR 2.0*         INR assessment THER    Recheck INR In: 1 WEEK    INR Location Home INR      Anticoagulation Summary  As of 3/1/2021    INR goal:  2.0-3.0   TTR:  71.3 % (1 y)   INR used for dosin.0 (2021)   Warfarin maintenance plan:  1.25 mg (2.5 mg x 0.5) every Thu; 2.5 mg (2.5 mg x 1) all other days   Full warfarin instructions:  1.25 mg every Thu; 2.5 mg all other days   Weekly warfarin total:  16.25 mg   No change documented:  Alice Dougherty RN   Plan last modified:  Allyn Doe RN (2021)   Next INR check:  3/8/2021   Priority:  Maintenance   Target end date:  Indefinite    Indications    Long-term (current) use of anticoagulants [Z79.01] [Z79.01]  Atrial fibrillation (H) [I48.91]  Antiphospholipid antibody syndrome (H) [D68.61]  Personal history of DVT (deep vein thrombosis) [Z86.718]  Atrial  fibrillation  unspecified type (H) (Resolved) [I48.91]             Anticoagulation Episode Summary     INR check location:      Preferred lab:  EXTERNAL LAB    Send INR reminders to:  CHELSEA CHILEL    Comments:  JESSICA okay to manage by exception      Anticoagulation Care Providers     Provider Role Specialty Phone number    Anya Nowak MD Referring Family Medicine [phone]

## 2021-03-01 NOTE — PROGRESS NOTES
Outpatient Speech Language Pathology Progress Note     Patient: Haresh Rock  : 1950    Beginning/End Dates of Reporting Period:  2021 to 2021    Referring Provider: Cindy Mitchell MD     Therapy Diagnosis: Dysphagia     Client Self Report: Pt reports to be doing well. Pt complains of  sensation of something stuck in the throat,but it improved throughout the day.     Objective Measurements: Daily session tracking       Goals:  Goal Identifier Diet tolerance    Goal Description 1. Pt will tolerate DD2 with thin liquids with independent use of supraglottic swallow (swallow-cough-swallow) in 9/10 PO trials and no adverse medical events over a 3 month period.    Target Date 21   Date Met  Goal not met    Progress:  Pt is consistently implementing the swallow-cough-swallow strategy with consumption of liquids.  Pt reports some intentionality to this behavior but he also notes that feels spontaneous cough as well.  Pt unable to elicit breath hold for full completion for full completion of supraglottic swallow.  Pt consistently denies any overt difficulties with swallowing.       Goal Identifier Exercise    Goal Description 2. Pt will complete established exercise programming for management of dysphagia as recommended by the treating SLP.    Target Date 21   Date Met  Goal not met   Progress:  Pt trained on LOUD exercises, traditional pharyngeal strengthening exercises, and EMST-150.  Pt has established exercise program within the home setting and notes that he is exercises 1x/day with either the pharyngeal strengthening exercises or EMST.  Pt is not consistently completing the LOUD exercises despite rationale and encouragement.         Progress Toward Goals:   Progress this reporting period: Pt has demonstrated good progress this reporting period.  He continues to be motivated and is completing exercises within the home setting.  Pt states ongoing difficulty with post-nasal drip and has  been educated on concern for secretion management but pt somewhat dismissive of this information.      Plan:  Continue therapy per current plan of care.    Discharge:  No

## 2021-03-02 ENCOUNTER — TELEPHONE (OUTPATIENT)
Dept: GASTROENTEROLOGY | Facility: CLINIC | Age: 71
End: 2021-03-02

## 2021-03-02 DIAGNOSIS — Z11.59 ENCOUNTER FOR SCREENING FOR OTHER VIRAL DISEASES: ICD-10-CM

## 2021-03-02 DIAGNOSIS — Z94.81 STATUS POST BONE MARROW TRANSPLANT (H): Primary | ICD-10-CM

## 2021-03-02 NOTE — TELEPHONE ENCOUNTER
Patient is scheduled for flex sig with Dr. Holm    Spoke with: Nursing staff from [facility]    Date of Procedure: 3/17/2021    Location: [address] [facility]    Sedation Type  C/S- messages provider to [facility]    Conscious Sedation- Needs  for 6 hours after the procedure  MAC/General-Needs  for 24 hours after procedure    Pre-op for [facility] MAC and OR not needed    (if yes advise patient they will need a pre-op prior to procedure)      Is patient on blood thinners? -no (If yes- inform patient to follow up with PCP or provider for follow up instructions)     Informed patient they will need an adult  yes  Cannot take any type of public or medical transportation alone    Informed Patient of COVID Test Requirement yes     Preferred Pharmacy for Pre Prescription on chart    Confirmed Nurse will call to complete assessment yes    Additional comments: no

## 2021-03-05 ENCOUNTER — TRANSFERRED RECORDS (OUTPATIENT)
Dept: HEALTH INFORMATION MANAGEMENT | Facility: CLINIC | Age: 71
End: 2021-03-05

## 2021-03-05 ENCOUNTER — ANTICOAGULATION THERAPY VISIT (OUTPATIENT)
Dept: FAMILY MEDICINE | Facility: CLINIC | Age: 71
End: 2021-03-05

## 2021-03-05 DIAGNOSIS — I48.0 PAROXYSMAL ATRIAL FIBRILLATION (H): ICD-10-CM

## 2021-03-05 DIAGNOSIS — D68.61 ANTIPHOSPHOLIPID ANTIBODY SYNDROME (H): ICD-10-CM

## 2021-03-05 DIAGNOSIS — Z86.718 PERSONAL HISTORY OF DVT (DEEP VEIN THROMBOSIS): ICD-10-CM

## 2021-03-05 DIAGNOSIS — Z79.01 LONG TERM CURRENT USE OF ANTICOAGULANT THERAPY: ICD-10-CM

## 2021-03-05 LAB — INR PPP: 1.4 (ref 0.9–1.1)

## 2021-03-05 NOTE — PROGRESS NOTES
Incoming fax    Receieved on 3/5/2021   From [facility]   INR 1.4       Macrina Avila, RN, BSN, PHN

## 2021-03-05 NOTE — PROGRESS NOTES
ANTICOAGULATION MANAGEMENT     Patient Name:  Haresh Rock  Date:  3/5/2021    ASSESSMENT /SUBJECTIVE:    Today's INR result of 1.4 is subtherapeutic. Goal INR of 2.0-3.0      Warfarin dose taken: Warfarin taken as instructed    Diet: No new diet changes affecting INR    Medication changes/ interactions: No new medications/supplements affecting INR    Previous INR: Therapeutic     S/S of bleeding or thromboembolism: No    New injury or illness: No    Upcoming surgery, procedure or cardioversion: No    Additional findings: Patient's INR has been bouncing up and down.  However, last few weeks staying more consistent. He denies any overall changes or missed doses.      PLAN:    Telephone call with Haresh regarding INR result and instructed:     Warfarin Dosing Instructions: Boost dose of 5 mg today, then change your warfarin dose to 2.5 mg daily  . (8 % change)    Instructed patient to follow up no later than: 1 week  Patient to recheck with home meter    Education provided: Monitoring for bleeding signs and symptoms and Monitoring for clotting signs and symptoms      Haresh verbalizes understanding and agrees to warfarin dosing plan.    Instructed to call the Anticoagulation Clinic for any changes, questions or concerns. (#[phone])        Allyn Doe, KOSTAS      OBJECTIVE:  Recent labs: (last 7 days)     21   INR 2.0* 1.4*         No question data found.  Anticoagulation Summary  As of 3/5/2021    INR goal:  2.0-3.0   TTR:  69.8 % (1 y)   INR used for dosin.4 (3/5/2021)   Warfarin maintenance plan:  2.5 mg (2.5 mg x 1) every day   Full warfarin instructions:  3/5: 5 mg; Otherwise 2.5 mg every day   Weekly warfarin total:  17.5 mg   Plan last modified:  Allyn Doe RN (3/5/2021)   Next INR check:  3/12/2021   Priority:  Maintenance   Target end date:  Indefinite    Indications    Long-term (current) use of anticoagulants [Z79.01] [Z79.01]  Atrial fibrillation (H) [I48.91]  Antiphospholipid  antibody syndrome (H) [D68.61]  Personal history of DVT (deep vein thrombosis) [Z86.718]  Atrial fibrillation  unspecified type (H) (Resolved) [I48.91]             Anticoagulation Episode Summary     INR check location:      Preferred lab:  EXTERNAL LAB    Send INR reminders to:  CHELSEA CHILEL    Comments:  JESSICA okay to manage by exception      Anticoagulation Care Providers     Provider Role Specialty Phone number    Anya Nowak MD Referring Family Medicine [phone]

## 2021-03-09 PROBLEM — I10 ESSENTIAL HYPERTENSION: Status: ACTIVE | Noted: 2020-09-23

## 2021-03-09 PROBLEM — T86.00 COMPLICATIONS OF BONE MARROW TRANSPLANT (H): Status: RESOLVED | Noted: 2020-09-23 | Resolved: 2021-03-09

## 2021-03-09 PROBLEM — G20.A1 PARKINSON DISEASE (H): Status: ACTIVE | Noted: 2020-09-24

## 2021-03-09 PROBLEM — I49.9 CARDIAC DYSRHYTHMIA: Status: RESOLVED | Noted: 2020-09-23 | Resolved: 2021-03-09

## 2021-03-09 PROBLEM — K74.60 CIRRHOSIS OF LIVER NOT DUE TO ALCOHOL (H): Status: ACTIVE | Noted: 2020-09-23

## 2021-03-09 PROBLEM — E10.9 DIABETES MELLITUS TYPE 1 (H): Status: RESOLVED | Noted: 2020-09-23 | Resolved: 2021-03-09

## 2021-03-09 PROBLEM — I10 ESSENTIAL HYPERTENSION: Status: RESOLVED | Noted: 2020-09-23 | Resolved: 2021-03-09

## 2021-03-09 PROBLEM — D75.9 DISEASE OF BLOOD AND BLOOD FORMING ORGAN: Status: RESOLVED | Noted: 2020-09-23 | Resolved: 2021-03-09

## 2021-03-09 PROBLEM — T86.00 COMPLICATIONS OF BONE MARROW TRANSPLANT (H): Status: ACTIVE | Noted: 2020-09-23

## 2021-03-10 ENCOUNTER — HOSPITAL ENCOUNTER (OUTPATIENT)
Dept: SPEECH THERAPY | Facility: CLINIC | Age: 71
Setting detail: THERAPIES SERIES
End: 2021-03-10
Attending: OTOLARYNGOLOGY
Payer: COMMERCIAL

## 2021-03-10 PROCEDURE — 92526 ORAL FUNCTION THERAPY: CPT | Mod: GN,95 | Performed by: SPEECH-LANGUAGE PATHOLOGIST

## 2021-03-11 NOTE — PROGRESS NOTES
03/10/21 1500   Signing Clinician's Name / Credentials   Signing clinician's name /credentials Eloisa Alexis MS CCC-SLP   Session Number   Session Number 19       Present No   Progress/Recertification   Progress note due 05/23/21   Completed Date 02/22/21   Subjective Report   Subjective Report Pt presents alert and cooperative for speech therapy   Swallow Goals   SLP Swallow Goals 1;2   Swallow Goal 1   Goal Identifier Diet tolerance    Goal Description 1. Pt will tolerate DD2 with thin liquids with independent use of supraglottic swallow (swallow-cough-swallow) in 9/10 PO trials and no adverse medical events over a 3 month period.    Target Date 02/07/21   Swallow Goal 2   Goal Identifier Exercise    Goal Description 2. Pt will complete established exercise programming for management of dysphagia as recommended by the treating SLP.    Target Date 02/07/21   Treatment Swallow/Oral dysfunction   Treatment of Swallowing Dysfunction &/or Oral Function for Feeding Minutes (25130) 45 Minutes   Skilled Intervention Provided written and verbal information on diet modifications.;Other  ( trained supraglottic swallow )   Patient Response Pt reported and demonstrated understanding and agreement    Treatment Detail 1.Pt drinking mountain dew during treatment session. Pt educated on increasing water intake to prevent dehydration and improve constipation. Pt demonstrates supraglottic swallow after 5 sips with independence. 2.  Pt completed lester maneuver (x5) and effortful swallow (x10) with min cues for technique. Pt reports the lester maneuver is difficult if his mouth is dry. Pt completed 5 sets of 5 using the EMST. Pt was instructed to continue to use the EMST at a quarter past 60 and was able to pop valve on all the trials, except one. Pt was cued to take a deep breath before blowing. Pt is encouraged to continue using the EMST  5x/week in addition to the swallowing and loudness exercises discussed.  Pt reports that he does not always do the LOUD exercises but that he knows it will help with breath support. Pt reports that he would rather use the EMST 150   Progress Addressing goals as indicated on POC    Education   Learner Family;Patient   Readiness Acceptance   Method Booklet/handout;Explanation;Video;Demonstration   Response Verbalizes understanding;Demonstrates understanding   Plan   Homework Compensatory strategies, LSVT exercises, oropharyngeal strengtheing exercises    Home program DD2 with thins using supraglottic swallow, oral cares, home exercise program   Comments   Comments Video Visit: please see attached note    Total Session Time   Total Treatment Time (sum of timed and untimed services) 40   AMBULATORY CLINICS ONLY-MEDICAL AND TREATMENT DIAGNOSIS   Medical Diagnosis Dysphagia    Treatment Diagnosis Moderate to severe oropharyngeal dysphagia

## 2021-03-12 ENCOUNTER — TRANSFERRED RECORDS (OUTPATIENT)
Dept: HEALTH INFORMATION MANAGEMENT | Facility: CLINIC | Age: 71
End: 2021-03-12

## 2021-03-12 ENCOUNTER — ANTICOAGULATION THERAPY VISIT (OUTPATIENT)
Dept: FAMILY MEDICINE | Facility: CLINIC | Age: 71
End: 2021-03-12

## 2021-03-12 DIAGNOSIS — I48.0 PAROXYSMAL ATRIAL FIBRILLATION (H): ICD-10-CM

## 2021-03-12 DIAGNOSIS — Z86.718 PERSONAL HISTORY OF DVT (DEEP VEIN THROMBOSIS): ICD-10-CM

## 2021-03-12 DIAGNOSIS — Z79.01 LONG TERM CURRENT USE OF ANTICOAGULANT THERAPY: ICD-10-CM

## 2021-03-12 DIAGNOSIS — D68.61 ANTIPHOSPHOLIPID ANTIBODY SYNDROME (H): ICD-10-CM

## 2021-03-12 LAB — INR PPP: 2.3 (ref 0.9–1.1)

## 2021-03-12 NOTE — PROGRESS NOTES
ANTICOAGULATION MANAGEMENT     Patient Name:  Haresh Rock  Date:  3/12/2021    ASSESSMENT /SUBJECTIVE:    Today's INR result of 2.3 is therapeutic. Goal INR of 2.0-3.0      Warfarin dose taken: Warfarin taken as instructed    Diet: No new diet changes affecting INR    Medication changes/ interactions: No new medications/supplements affecting INR    Previous INR: Subtherapeutic     S/S of bleeding or thromboembolism: No    New injury or illness: No    Upcoming surgery, procedure or cardioversion: Yes. Pt is having a flex sig scheduled for . PCP said no bridge was necessary. Pt was to hold 5 days although he already took his warfarin this morning. It will end up being closer to a 4 and a half day hold.    Additional findings: None      PLAN:    Telephone call with Haresh regarding INR result and instructed:     Warfarin Dosing Instructions: Hold warfarin until your procedure. On the night of the procedure, take 5mg.    Instructed patient to follow up no later than: 1 week  Patient to recheck with home meter    Education provided: Target INR goal and significance of current INR result      Haresh verbalizes understanding and agrees to warfarin dosing plan.    Instructed to call the Anticoagulation Clinic for any changes, questions or concerns. (#[phone])        Hari Saavedra RN      OBJECTIVE:  Recent labs: (last 7 days)     21   INR 2.3*         No question data found.  Anticoagulation Summary  As of 3/12/2021    INR goal:  2.0-3.0   TTR:  68.5 % (1 y)   INR used for dosin.3 (3/12/2021)   Warfarin maintenance plan:  2.5 mg (2.5 mg x 1) every day   Full warfarin instructions:  3/13: Hold; 3/14: Hold; 3/15: Hold; 3/16: Hold; 3/17: 5 mg; Otherwise 2.5 mg every day   Weekly warfarin total:  17.5 mg   Plan last modified:  Allyn Doe RN (3/5/2021)   Next INR check:  3/19/2021   Priority:  Maintenance   Target end date:  Indefinite    Indications    Long-term (current) use of anticoagulants  [Z79.01] [Z79.01]  Atrial fibrillation (H) [I48.91]  Antiphospholipid antibody syndrome (H) [D68.61]  Personal history of DVT (deep vein thrombosis) [Z86.718]  Atrial fibrillation  unspecified type (H) (Resolved) [I48.91]             Anticoagulation Episode Summary     INR check location:      Preferred lab:  [facility]TER[facility]    Send INR reminders to:  CHELSEA CHILEL    Comments:  JESSICA okay to manage by exception      Anticoagulation Care Providers     Provider Role Specialty Phone number    Anya Nowak MD Referring Family Medicine [phone]

## 2021-03-15 ENCOUNTER — HOSPITAL ENCOUNTER (OUTPATIENT)
Dept: SPEECH THERAPY | Facility: CLINIC | Age: 71
Setting detail: THERAPIES SERIES
End: 2021-03-15
Attending: OTOLARYNGOLOGY
Payer: COMMERCIAL

## 2021-03-15 DIAGNOSIS — Z11.59 ENCOUNTER FOR SCREENING FOR OTHER VIRAL DISEASES: ICD-10-CM

## 2021-03-15 LAB
SARS-COV-2 RNA RESP QL NAA+PROBE: NORMAL
SPECIMEN SOURCE: NORMAL

## 2021-03-15 PROCEDURE — U0005 INFEC AGEN DETEC AMPLI PROBE: HCPCS | Performed by: INTERNAL MEDICINE

## 2021-03-15 PROCEDURE — U0003 INFECTIOUS AGENT DETECTION BY NUCLEIC ACID (DNA OR RNA); SEVERE ACUTE RESPIRATORY SYNDROME CORONAVIRUS 2 (SARS-COV-2) (CORONAVIRUS DISEASE [COVID-19]), AMPLIFIED PROBE TECHNIQUE, MAKING USE OF HIGH THROUGHPUT TECHNOLOGIES AS DESCRIBED BY CMS-2020-01-R: HCPCS | Performed by: INTERNAL MEDICINE

## 2021-03-15 PROCEDURE — 92526 ORAL FUNCTION THERAPY: CPT | Mod: GN,GT | Performed by: SPEECH-LANGUAGE PATHOLOGIST

## 2021-03-15 NOTE — PROGRESS NOTES
Haresh Rock is a 70 year old male who is being seen via a billable video visit.      Patient has given verbal consent for Video visit? Yes    Video Start Time: 1:21 pm    Telehealth Visit Details    Type of Service:  Telehealth    Video End Time (time video stopped): 1:59 pm     Originating Location (pt. location): Home    Additional Participants in Telehealth Visit: None     Distant Location (provider location):  [facility]     Mode of Communication (Audio Visual or Audio Only):  Audio Visual     Linda Anthony, SLP  March 15, 2021

## 2021-03-16 LAB
LABORATORY COMMENT REPORT: NORMAL
SARS-COV-2 RNA RESP QL NAA+PROBE: NEGATIVE
SPECIMEN SOURCE: NORMAL

## 2021-03-17 ENCOUNTER — DOCUMENTATION ONLY (OUTPATIENT)
Dept: FAMILY MEDICINE | Facility: CLINIC | Age: 71
End: 2021-03-17

## 2021-03-17 ENCOUNTER — HOSPITAL ENCOUNTER (OUTPATIENT)
Facility: CLINIC | Age: 71
Discharge: HOME | End: 2021-03-17
Attending: INTERNAL MEDICINE | Admitting: INTERNAL MEDICINE
Payer: COMMERCIAL

## 2021-03-17 VITALS
OXYGEN SATURATION: 99 % | WEIGHT: 157.63 LBS | TEMPERATURE: 97.9 F | HEART RATE: 64 BPM | BODY MASS INDEX: 21.35 KG/M2 | SYSTOLIC BLOOD PRESSURE: 132 MMHG | DIASTOLIC BLOOD PRESSURE: 68 MMHG | HEIGHT: 72 IN | RESPIRATION RATE: 18 BRPM

## 2021-03-17 LAB
FLEXIBLE SIGMOIDOSCOPY: NORMAL
INR PPP: 1.41 (ref 0.86–1.14)

## 2021-03-17 PROCEDURE — 45331 SIGMOIDOSCOPY AND BIOPSY: CPT | Performed by: INTERNAL MEDICINE

## 2021-03-17 PROCEDURE — 45338 SIGMOIDOSCOPY W/TUMR REMOVE: CPT | Performed by: INTERNAL MEDICINE

## 2021-03-17 PROCEDURE — 88305 TISSUE EXAM BY PATHOLOGIST: CPT | Mod: TC | Performed by: INTERNAL MEDICINE

## 2021-03-17 PROCEDURE — 88305 TISSUE EXAM BY PATHOLOGIST: CPT | Mod: 26 | Performed by: PATHOLOGY

## 2021-03-17 PROCEDURE — 85610 PROTHROMBIN TIME: CPT | Performed by: INTERNAL MEDICINE

## 2021-03-17 PROCEDURE — 36415 COLL VENOUS BLD VENIPUNCTURE: CPT | Performed by: INTERNAL MEDICINE

## 2021-03-17 PROCEDURE — 88342 IMHCHEM/IMCYTCHM 1ST ANTB: CPT | Mod: TC | Performed by: INTERNAL MEDICINE

## 2021-03-17 PROCEDURE — 88342 IMHCHEM/IMCYTCHM 1ST ANTB: CPT | Mod: 26 | Performed by: PATHOLOGY

## 2021-03-17 RX ORDER — NALOXONE HYDROCHLORIDE 0.4 MG/ML
0.4 INJECTION, SOLUTION INTRAMUSCULAR; INTRAVENOUS; SUBCUTANEOUS
Status: DISCONTINUED | OUTPATIENT
Start: 2021-03-17 | End: 2021-03-17 | Stop reason: HOSPADM

## 2021-03-17 RX ORDER — ONDANSETRON 2 MG/ML
4 INJECTION INTRAMUSCULAR; INTRAVENOUS
Status: DISCONTINUED | OUTPATIENT
Start: 2021-03-17 | End: 2021-03-17 | Stop reason: HOSPADM

## 2021-03-17 RX ORDER — NALOXONE HYDROCHLORIDE 0.4 MG/ML
0.2 INJECTION, SOLUTION INTRAMUSCULAR; INTRAVENOUS; SUBCUTANEOUS
Status: DISCONTINUED | OUTPATIENT
Start: 2021-03-17 | End: 2021-03-17 | Stop reason: HOSPADM

## 2021-03-17 RX ORDER — LIDOCAINE 40 MG/G
CREAM TOPICAL
Status: DISCONTINUED | OUTPATIENT
Start: 2021-03-17 | End: 2021-03-17 | Stop reason: HOSPADM

## 2021-03-17 RX ORDER — FLUMAZENIL 0.1 MG/ML
0.2 INJECTION, SOLUTION INTRAVENOUS
Status: DISCONTINUED | OUTPATIENT
Start: 2021-03-17 | End: 2021-03-17 | Stop reason: HOSPADM

## 2021-03-17 RX ORDER — ONDANSETRON 2 MG/ML
4 INJECTION INTRAMUSCULAR; INTRAVENOUS EVERY 6 HOURS PRN
Status: DISCONTINUED | OUTPATIENT
Start: 2021-03-17 | End: 2021-03-17 | Stop reason: HOSPADM

## 2021-03-17 RX ORDER — PROCHLORPERAZINE MALEATE 5 MG
5 TABLET ORAL EVERY 6 HOURS PRN
Status: DISCONTINUED | OUTPATIENT
Start: 2021-03-17 | End: 2021-03-17 | Stop reason: HOSPADM

## 2021-03-17 RX ORDER — ONDANSETRON 4 MG/1
4 TABLET, ORALLY DISINTEGRATING ORAL EVERY 6 HOURS PRN
Status: DISCONTINUED | OUTPATIENT
Start: 2021-03-17 | End: 2021-03-17 | Stop reason: HOSPADM

## 2021-03-17 ASSESSMENT — MIFFLIN-ST. JEOR: SCORE: 1503

## 2021-03-17 NOTE — PROGRESS NOTES
ANTICOAGULATION  MANAGEMENT: Discharge Review    Haresh Rock chart reviewed for anticoagulation continuity of care    Hospital Admission on 3/17 for Sigmoidoscopy.    Discharge disposition: Home    Results:    Recent labs: (last 7 days)     03/12/21 03/17/21  1032   INR 2.3* 1.41*     Anticoagulation inpatient management:     held warfarin due to procedure  and resumed on 3/17    Anticoagulation discharge instructions:     Warfarin dosing: home regimen continued   Bridging: No   INR goal change: No      Medication changes affecting anticoagulation: No    Additional factors affecting anticoagulation: No    Plan     No adjustment to anticoagulation plan needed    Patient not contacted    No adjustment to Anticoagulation Calendar was required    Blanca Rodriguez RN

## 2021-03-17 NOTE — OR NURSING
Pt had flex sig with biopsy and polypectomy, no sedation. Pt tolerated procedure well. Pt stable at time of transfer to  recovery.

## 2021-03-18 ENCOUNTER — TELEPHONE (OUTPATIENT)
Dept: GASTROENTEROLOGY | Facility: CLINIC | Age: 71
End: 2021-03-18

## 2021-03-18 DIAGNOSIS — Z86.0101 HISTORY OF ADENOMATOUS POLYP OF COLON: Primary | ICD-10-CM

## 2021-03-18 LAB — COPATH REPORT: NORMAL

## 2021-03-18 NOTE — TELEPHONE ENCOUNTER
Recent sigmoidoscopy showed tubular adenoma.     Recommended full colonoscopy, as discussed with pt on day of exam.    Order sent today to schedulers to coordinate. Will need to again hold warfarin.    ULI Holm MD  Professor of Medicine  Division of Gastroenterology, Hepatology and Nutrition  [facility]

## 2021-03-19 ENCOUNTER — TRANSFERRED RECORDS (OUTPATIENT)
Dept: HEALTH INFORMATION MANAGEMENT | Facility: CLINIC | Age: 71
End: 2021-03-19

## 2021-03-19 ENCOUNTER — ANTICOAGULATION THERAPY VISIT (OUTPATIENT)
Dept: FAMILY MEDICINE | Facility: CLINIC | Age: 71
End: 2021-03-19

## 2021-03-19 ENCOUNTER — HOSPITAL ENCOUNTER (OUTPATIENT)
Dept: SPEECH THERAPY | Facility: CLINIC | Age: 71
Setting detail: THERAPIES SERIES
End: 2021-03-19
Attending: OTOLARYNGOLOGY
Payer: COMMERCIAL

## 2021-03-19 DIAGNOSIS — D68.61 ANTIPHOSPHOLIPID ANTIBODY SYNDROME (H): ICD-10-CM

## 2021-03-19 DIAGNOSIS — I48.0 PAROXYSMAL ATRIAL FIBRILLATION (H): ICD-10-CM

## 2021-03-19 DIAGNOSIS — Z79.01 LONG TERM CURRENT USE OF ANTICOAGULANT THERAPY: ICD-10-CM

## 2021-03-19 DIAGNOSIS — Z86.718 PERSONAL HISTORY OF DVT (DEEP VEIN THROMBOSIS): ICD-10-CM

## 2021-03-19 LAB — INR PPP: 1.5 (ref 0.9–1.1)

## 2021-03-19 PROCEDURE — 92526 ORAL FUNCTION THERAPY: CPT | Mod: GN,GT | Performed by: SPEECH-LANGUAGE PATHOLOGIST

## 2021-03-19 NOTE — PROGRESS NOTES
ANTICOAGULATION MANAGEMENT     Patient Name:  Haresh Rock  Date:  3/19/2021    ASSESSMENT /SUBJECTIVE:    Today's INR result of 1.5 is subtherapeutic. Goal INR of 2.0-3.0      Warfarin dose taken: Warfarin recently held for procedure which may be affecting INR    Diet: No new diet changes affecting INR    Medication changes/ interactions: No new medications/supplements affecting INR    Previous INR: Therapeutic     S/S of bleeding or thromboembolism: No    New injury or illness: No    Upcoming surgery, procedure or cardioversion: No    Additional findings: None      PLAN:    Telephone call with Haresh regarding INR result and instructed:     Warfarin Dosing Instructions: 5 mg boost dose then continue your current warfarin dose of 2.5 mg daily    Instructed patient to follow up no later than: 1 week  Patient to recheck with home meter    Education provided: Monitoring for bleeding signs and symptoms and Monitoring for clotting signs and symptoms      Haresh verbalizes understanding and agrees to warfarin dosing plan.    Instructed to call the Anticoagulation Clinic for any changes, questions or concerns. (#[phone])        Allyn Doe RN      OBJECTIVE:  Recent labs: (last 7 days)     21  1032 21   INR 1.41* 1.5*         No question data found.  Anticoagulation Summary  As of 3/19/2021    INR goal:  2.0-3.0   TTR:  67.1 % (1 y)   INR used for dosin.5 (3/19/2021)   Warfarin maintenance plan:  2.5 mg (2.5 mg x 1) every day   Full warfarin instructions:  3/19: 5 mg; Otherwise 2.5 mg every day   Weekly warfarin total:  17.5 mg   Plan last modified:  Allyn Doe RN (3/5/2021)   Next INR check:  3/26/2021   Priority:  Maintenance   Target end date:  Indefinite    Indications    Long-term (current) use of anticoagulants [Z79.01] [Z79.01]  Atrial fibrillation (H) [I48.91]  Antiphospholipid antibody syndrome (H) [D68.61]  Personal history of DVT (deep vein thrombosis) [Z86.718]  Atrial  fibrillation  unspecified type (H) (Resolved) [I48.91]             Anticoagulation Episode Summary     INR check location:      Preferred lab:  EXTERNAL LAB    Send INR reminders to:  CHELSEA CHILEL    Comments:  JESSICA okay to manage by exception      Anticoagulation Care Providers     Provider Role Specialty Phone number    Anya Nowak MD Referring Family Medicine [phone]

## 2021-03-23 ENCOUNTER — ANCILLARY PROCEDURE (OUTPATIENT)
Dept: GENERAL RADIOLOGY | Facility: CLINIC | Age: 71
End: 2021-03-23
Attending: OTOLARYNGOLOGY
Payer: COMMERCIAL

## 2021-03-23 ENCOUNTER — THERAPY VISIT (OUTPATIENT)
Dept: SPEECH THERAPY | Facility: CLINIC | Age: 71
End: 2021-03-23
Payer: COMMERCIAL

## 2021-03-23 DIAGNOSIS — R13.12 OROPHARYNGEAL DYSPHAGIA: Primary | ICD-10-CM

## 2021-03-23 DIAGNOSIS — R13.10 DYSPHAGIA, UNSPECIFIED TYPE: ICD-10-CM

## 2021-03-23 PROCEDURE — 74230 X-RAY XM SWLNG FUNCJ C+: CPT | Mod: GC | Performed by: RADIOLOGY

## 2021-03-23 PROCEDURE — 92611 MOTION FLUOROSCOPY/SWALLOW: CPT | Mod: GN | Performed by: SPEECH-LANGUAGE PATHOLOGIST

## 2021-03-23 PROCEDURE — 92610 EVALUATE SWALLOWING FUNCTION: CPT | Mod: GN | Performed by: SPEECH-LANGUAGE PATHOLOGIST

## 2021-03-23 RX ORDER — BARIUM SULFATE 400 MG/ML
10 SUSPENSION ORAL ONCE
Status: COMPLETED | OUTPATIENT
Start: 2021-03-23 | End: 2021-03-23

## 2021-03-23 RX ADMIN — BARIUM SULFATE 10 ML: 400 SUSPENSION ORAL at 14:52

## 2021-03-24 ENCOUNTER — TELEPHONE (OUTPATIENT)
Dept: GASTROENTEROLOGY | Facility: CLINIC | Age: 71
End: 2021-03-24

## 2021-03-24 NOTE — TELEPHONE ENCOUNTER
Patient is scheduled for colonoscopy with Dr. Holm    Spoke with: Haresh Rock    Date of Procedure: 4/14/2021    Location: [facility]    Sedation Type C/S    Conscious Sedation- Needs  for 6 hours after the procedure  MAC/General-Needs  for 24 hours after procedure    Pre-op for [facility] MAC and OR not needed    (if yes advise patient they will need a pre-op prior to procedure)      Is patient on blood thinners? -YES (If yes- inform patient to follow up with PCP or provider for follow up instructions)     Informed patient they will need an adult  yes   Cannot take any type of public or medical transportation alone    Informed Patient of COVID Test Requirement yes and scheduled    Preferred Pharmacy for Pre Prescription on chart    Confirmed Nurse will call to complete assessment yes    Additional comments: no

## 2021-03-25 NOTE — PROGRESS NOTES
Speech-Language Pathology Department   EVALUATION  [facility]  Video Swallow Study Evaluation    03/23/21 1300   General Information   Type Of Visit Initial   Start Of Care Date 03/23/21   Referring Physician Cindy Mitchell MD    Orders Evaluate And Treat   Orders Comment Video Swallow Study   Medical Diagnosis Dysphagia    Onset Of Illness/injury Or Date Of Surgery 03/23/21   Precautions/limitations Swallowing Precautions   Hearing WFL for conversational speech production    Pertinent History of Current Problem/OT: Additional Occupational Profile Info Haresh Rock is a 70 year old male with a complex PMH significant for Parkinson's disease, 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 Pt has been completing swallow therapy with Linda Anthony, SLP at [facility]. He presents today for repeat video swallow study evaluation. Today he reports he thinks his swallow function is improving. He independently uses the supraglottic swallow strategy. Reports he thinks he has lost 4 or 5 lbs in the last month. Endorses drooling at night. Denies recent pneumonias.   Respiratory Status Room air   Prior Level Of Function Swallowing   Prior Level Of Function Comment Regular, moist textures (consistent with DD3) and thin liquids   Patient Role/employment History Retired   Living Environment [place]   General Observations Pleasant and cooperative, flat affect, masked facial expresion, slow movement    Patient/family Goals To re-evaluate swallow function after completing swallowing therapy   Clinical Swallow Evaluation   Oral Musculature generally intact   Structural Abnormalities none present   Dentition present and adequate   Secretion Management  problems swallowing secretions   Mucosal Quality adequate   Mandibular Strength and Mobility intact   Oral Labial Strength and Mobility WFL;other (see comments)  (slow movements)   Lingual Strength and Mobility WFL;other (see comments)  (slow movements)   Velar Elevation intact   Buccal Strength and Mobility intact   Laryngeal Function Throat clear;Cough;Swallow;Voicing initiated   Oral Musculature Comments Movements functional, but slow. Cough strong with cues. Masked facial apperance throughout evaluation.    Additional evaluation(s) completed today Yes   Rationale for completing additional evaluation To further assess pharyngeal phase   VFSS Eval: Radiology   Radiologist Resident; Dr. Herring   Views Taken left lateral;A/P   Physical Location of Procedure [facility]   VFSS Eval: Thin Liquid Texture Trial   Mode of Presentation, Thin Liquid cup;self-fed   Order of Presentation Series 1,2,5,7,9,11  (11-AP)   Preparatory Phase WFL   Oral Phase, Thin Liquid Premature pharyngeal entry   Pharyngeal Phase, Thin Liquid Pharyngeal wall coating;Residue in valleculae;Residue in pyriform sinus   Rosenbek's Penetration Aspiration Scale: Thin Liquid Trial Results 8 - contrast passes glottis, visible subglottic residue remains, absent patient response (aspiration)   Response to Aspiration absent response, silent aspiration   Successful Strategies Trialed During Procedure, Thin Liquid other (see comments)  (modified supraglottic swallow strategy)   Diagnostic Statement Premature spillage to valleculae. Incomplete epiglottic inversion and significantly reduce hyolaryngeal excursion that results in penetration during the swallow with silent aspiration during and after observed throughout the study. Pt uses modified supraglottic swallow strategy that clears most, but not all subglottic residuals. Moderate to moderately severe residuals in valleculae and piriforms with posterior pharyngeal wall coating.    VFSS Eval: Nectar Thick  Liquid Texture Trial   Mode of Presentation, Nectar cup;self-fed   Order of Presentation Series 3,6   Preparatory Phase WFL   Oral Phase, Nectar Premature pharyngeal entry   Pharyngeal Phase, Nectar Pharyngeal wall coating;Residue in valleculae;Residue in pyriform sinus   Rosenbek's Penetration Aspiration Scale: Nectar-Thick Liquid Trial Results 8 - contrast passes glottis, visible subglottic residue remains, absent patient response (aspiration)   Response to Aspiration, Nectar absent response, silent aspiration   Successful Strategies Trialed During Procedure, Nectar other (see comments)  (modified supraglottic swallow strategy)   Diagnostic Statement Premature spillage to valleculae. Penetration during the swallow with residuals that results in silent aspiration in subsequent swallows. Pt uses modified supraglottic swallow strategy independently that clears majority of subglottic residuals. Moderate to moderately severe residuals in valleculae and piriforms with posterior pharyngeal wall coating.    VFSS Eval: Puree Solid Texture Trial   Mode of Presentation, Puree spoon;self-fed   Order of Presentation Series, 4,8   Preparatory Phase Poor bolus control   Oral Phase, Puree Poor AP movement;Premature pharyngeal entry  (tongue pumping)   Pharyngeal Phase, Puree Residue in valleculae;Residue in pyriform sinus;other (see comments)  (residuals extending on AE folds)   Rosenbek's Penetration Aspiration Scale: Puree Food Trial Results 2 - contrast enters airway, remains above the vocal cords, no residue remains (penetration)   Diagnostic Statement Suspect trace penetration of puree residuals. No definitive aspiration. Severe vallecular residue that extends along the AE folds and eventually moves into the piriforms. Liquid wash is modestly helpful to reduce residuals, but then results in aspiration of liquid.    VFSS Eval: Solid Food Texture Trial   Mode of Presentation, Solid self-fed   Order of Presentation Series 10    Preparatory Phase WFL   Oral Phase, Solid Poor AP movement   Pharyngeal Phase, Solid Residue in valleculae   Rosenbek's Penetration Aspiration Scale: Solid Food Trial Results 1 - no aspiration, contrast does not enter airway   Diagnostic Statement Vallecular residue extending onto AE folds.   Swallow Compensations   Swallow Compensations Alternate viscosity of consistencies;Pacing;Reduce amounts;Multiple swallow  (modified supraglottic swallow, aggressive oral cares)   Results Suspect silent aspiration   Educational Assessment   Barriers to Learning Cognitive   General Therapy Interventions   Planned Therapy Interventions Dysphagia Treatment   Dysphagia treatment Modified diet education;Instruction of safe swallow strategies;Compensatory strategies for swallowing;Oropharyngeal exercise training   Swallow Eval: Clinical Impressions   Skilled Criteria for Therapy Intervention Skilled criteria met.  Treatment indicated.   Dysphagia Outcome Severity Scale (KATHRYN) Level 2 - KATHRYN   Treatment Diagnosis Moderately severe oropharyngeal dysphagia   Diet texture recommendations Dysphagia diet level 2;Thin liquids   Recommended Feeding/Eating Techniques alternate between small bites and sips of food/liquid;maintain upright posture during/after eating for 30 mins;small sips/bites;other (see comments)  (aggressive oral cares, modified supraglottic swallow)   Rehab Potential fair, will monitor progress closely   Predicted Duration of Therapy Intervention (days/wks) Continue therapy at [facility] location   Anticipated Discharge Disposition home w/ outpatient services   Risks and Benefits of Treatment have been explained. Yes   Patient, family and/or staff in agreement with Plan of Care Yes   Clinical Impression Comments Haresh Rock presents today with continued moderately severe oropharyngeal dysphagia characterized by incomplete epiglottic inversion, diffuse pharygeal weakness, significantly reduced hyolaryngeal excursion and  "silent aspiration. Oral phase continues to be characterized by difficulty with AP movement and intermittent tongue pumping. Pt demonstrates penetration to the cords with subsequent silent aspiration during and after the swallow with thin and nectar thick liquids. He independently uses the modified supraglottic swallow (swallow-cough-swallow), which helps to reduce, but not clear subglottic residuals. There are moderate to moderately severe residuals in valleculae and piriforms that increase as PO viscosity increases. A posterior pharyngeal wall coating is observed with liquid textures. Liquid wash is helpful to reduce, but not clear residuals. Unfortunately, liquid washes then results in charles aspiration of liquid. Possible aspiration of puree and solid residuals during liquid wash. Pt remains at high risk of aspiration during and after the swallow. Recommend pt continue with DD2 and thin liquids with use of modified supraglottic swallow strategy and aggressive oral cares. Trained pt re: anatomy/physiology of swallowing, results of today's study and diet recommendations. Discussed with patient possible need for enteral support in the future should he continue to lose weight or begin getting pneumonias. Pt endorses eating \"feels like a chore\" but is not yet ready to consider other means of nutrition/hydration. Pt will benefit from continued SLP services to ensure diet tolerance and continue exercise program. Pt will [facility] services at [facility].    Swallow Goals   SLP Swallow Goals 1;2   Swallow Goal 1   Goal Identifier Diet tolerance    Goal Description 1. Pt will tolerate DD2 with thin liquids with independent use of supraglottic swallow (swallow-cough-swallow) in 9/10 PO trials and no adverse medical events over a 3 month period.    Target Date 06/21/21   Swallow Goal 2   Goal Identifier Exercise    Goal Description 2. Pt will complete established exercise programming for management of dysphagia as " recommended by the treating SLP.    Target Date 06/21/21   Total Session Time   SLP Eval: oral/pharyngeal swallow function, clinical minutes (01638) 12   SLP Eval: VideoFluoroscopic Swallow function Minutes (33231) 30   Total Evaluation Time 42     Thank you for the referral of Haresh Rock. If you have any questions about this report, please contact me using the information below.     JOSE ULIS [place] MA (music), CCC-SLP   Speech Language Pathologist  [place] Trained Vocologist   [facility]  Dept. of Otolaryngology  Department of Rehabilitation Services  [address] [place] [id number]  Email: [email or web address]   Pronouns: she/her/hers

## 2021-03-26 ENCOUNTER — ANTICOAGULATION THERAPY VISIT (OUTPATIENT)
Dept: FAMILY MEDICINE | Facility: CLINIC | Age: 71
End: 2021-03-26

## 2021-03-26 ENCOUNTER — TRANSFERRED RECORDS (OUTPATIENT)
Dept: HEALTH INFORMATION MANAGEMENT | Facility: CLINIC | Age: 71
End: 2021-03-26

## 2021-03-26 DIAGNOSIS — Z79.01 LONG TERM CURRENT USE OF ANTICOAGULANT THERAPY: ICD-10-CM

## 2021-03-26 DIAGNOSIS — Z86.718 PERSONAL HISTORY OF DVT (DEEP VEIN THROMBOSIS): ICD-10-CM

## 2021-03-26 DIAGNOSIS — I48.0 PAROXYSMAL ATRIAL FIBRILLATION (H): ICD-10-CM

## 2021-03-26 DIAGNOSIS — D68.61 ANTIPHOSPHOLIPID ANTIBODY SYNDROME (H): ICD-10-CM

## 2021-03-26 LAB — INR PPP: 2.2 (ref 0.9–1.1)

## 2021-03-26 NOTE — PROGRESS NOTES
Anticoagulation Management    Unable to reach Haresh at his time.    Today's INR result of 2. is therapeutic (goal INR of 2.0-3.0).  Result received from: Home Monitor    Follow up required to confirm warfarin dose taken and assess for changes    Left message to continue current dose of warfarin 2.5  mg tondimitrios.Shavon Morris.    Appears that patient has a procedure scheduled for 4/14/21.  Requested he include any hold or need for bridge plan in the call back. Alice Cuenca RN March 26, 2021 4:04 PM

## 2021-03-26 NOTE — PROGRESS NOTES
ANTICOAGULATION MANAGEMENT     Patient Name:  Haresh Rock  Date:  3/26/2021    ASSESSMENT /SUBJECTIVE:    Today's INR result of 2.2 is therapeutic. Goal INR of 2.0-3.0      Warfarin dose taken: Warfarin taken as instructed    Diet: No new diet changes affecting INR    Medication changes/ interactions: No new medications/supplements affecting INR    Previous INR: Subtherapeutic     S/S of bleeding or thromboembolism: No    New injury or illness: No    Upcoming surgery, procedure or cardioversion: Yes: colonoscopy 21; will need to hold warfarin.    Additional findings: None      PLAN:    Telephone call with Haresh regarding INR result and instructed:     Warfarin Dosing Instructions: Continue your current warfarin dose 2.5 mg daily    Instructed patient to follow up no later than: 1 week  Patient to recheck with home meter    Education provided: None required      Haresh verbalizes understanding and agrees to warfarin dosing plan.    Instructed to call the Anticoagulation Clinic for any changes, questions or concerns. (#[phone])        Joellen Molina RN      OBJECTIVE:  Recent labs: (last 7 days)     21   INR 2.2*         INR assessment THER    Recheck INR In: 2 WEEKS    INR Location Home INR      Anticoagulation Summary  As of 3/26/2021    INR goal:  2.0-3.0   TTR:  65.7 % (1 y)   INR used for dosin.2 (3/26/2021)   Warfarin maintenance plan:  2.5 mg (2.5 mg x 1) every day   Full warfarin instructions:  2.5 mg every day   Weekly warfarin total:  17.5 mg   Plan last modified:  Allyn Doe RN (3/5/2021)   Next INR check:  2021   Priority:  Maintenance   Target end date:  Indefinite    Indications    Long-term (current) use of anticoagulants [Z79.01] [Z79.01]  Atrial fibrillation (H) [I48.91]  Antiphospholipid antibody syndrome (H) [D68.61]  Personal history of DVT (deep vein thrombosis) [Z86.718]  Atrial fibrillation  unspecified type (H) (Resolved) [I48.91]             Anticoagulation  Episode Summary     INR check location:      Preferred lab:  EXTERNAL LAB    Send INR reminders to:  CHELSEA CHILEL    Comments:  JESSICA okay to manage by exception      Anticoagulation Care Providers     Provider Role Specialty Phone number    Anya Nowak MD Referring Family Medicine [phone]

## 2021-03-26 NOTE — PROGRESS NOTES
Incoming fax    Receieved on 03/26/21    From [facility]   INR 2.2       Macrina Avila RN, BSN, PHN

## 2021-03-28 DIAGNOSIS — Z11.59 ENCOUNTER FOR SCREENING FOR OTHER VIRAL DISEASES: Primary | ICD-10-CM

## 2021-03-29 ENCOUNTER — IMMUNIZATION (OUTPATIENT)
Dept: NURSING | Facility: CLINIC | Age: 71
End: 2021-03-29
Attending: INTERNAL MEDICINE
Payer: COMMERCIAL

## 2021-03-29 PROCEDURE — 0012A PR COVID VAC MODERNA 100 MCG/0.5 ML IM: CPT

## 2021-03-29 PROCEDURE — 91301 PR COVID VAC MODERNA 100 MCG/0.5 ML IM: CPT

## 2021-03-31 ENCOUNTER — CARE COORDINATION (OUTPATIENT)
Dept: TRANSPLANT | Facility: CLINIC | Age: 71
End: 2021-03-31

## 2021-03-31 RX ORDER — FOLIC ACID 1 MG/1
TABLET ORAL
Qty: 90 TABLET | Refills: 3 | OUTPATIENT
Start: 2021-03-31

## 2021-03-31 NOTE — PROGRESS NOTES
Received a request from [facility] pharmacy to refill folic acid rx. Noted that this medication had been stopped in November of 2020 and Dr. Miller had confirmed that this was correct in February of 2021. In speaking to the patient today, he stated that he was still taking the folic acid. Patient was informed of its discontinuation and refill was denied to the pharmacy. Patient verbalized back accurately that he is no longer to take the medication.

## 2021-04-02 ENCOUNTER — ANTICOAGULATION THERAPY VISIT (OUTPATIENT)
Dept: FAMILY MEDICINE | Facility: CLINIC | Age: 71
End: 2021-04-02

## 2021-04-02 ENCOUNTER — TRANSFERRED RECORDS (OUTPATIENT)
Dept: HEALTH INFORMATION MANAGEMENT | Facility: CLINIC | Age: 71
End: 2021-04-02

## 2021-04-02 DIAGNOSIS — Z86.718 PERSONAL HISTORY OF DVT (DEEP VEIN THROMBOSIS): ICD-10-CM

## 2021-04-02 DIAGNOSIS — I48.0 PAROXYSMAL ATRIAL FIBRILLATION (H): ICD-10-CM

## 2021-04-02 DIAGNOSIS — D68.61 ANTIPHOSPHOLIPID ANTIBODY SYNDROME (H): ICD-10-CM

## 2021-04-02 DIAGNOSIS — Z79.01 LONG TERM CURRENT USE OF ANTICOAGULANT THERAPY: ICD-10-CM

## 2021-04-02 LAB — INR PPP: 2.2 (ref 0.9–1.1)

## 2021-04-02 NOTE — PROGRESS NOTES
ANTICOAGULATION  MANAGEMENT-Patient Home Monitoring Result    Assessment     Therapeutic INR result of 2.2 . Goal range 2.0-3.0. Received via fax from [employer] home INR monitoring company.        Previous INR was therapeutic    Haresh was last contacted by phone: 3/26    Plan     Per home monitoring agreement with patient, patient was NOT contacted regarding therapeutic result today.  Patient is to continue current dose and continue to check INR with home monitor per protocol.  ?       OBJECTIVE    INR   Date Value Ref Range Status   2021 2.2 (A) 0.90 - 1.10 Final     Factor 2 Assay   Date Value Ref Range Status   2007 58 (L) 60 - 140 % Final     Comment:     (Note)  CALLED TO TAMARA(INTERV. RADIOLOGY)[id number],        ASSESSMENT / PLAN  No question data found.  Anticoagulation Summary  As of 2021    INR goal:  2.0-3.0   TTR:  65.7 % (1 y)   INR used for dosin.2 (2021)   Warfarin maintenance plan:  2.5 mg (2.5 mg x 1) every day   Full warfarin instructions:  2.5 mg every day   Weekly warfarin total:  17.5 mg   Plan last modified:  Allyn Doe RN (3/5/2021)   Next INR check:     Priority:  Maintenance   Target end date:  Indefinite    Indications    Long-term (current) use of anticoagulants [Z79.01] [Z79.01]  Atrial fibrillation (H) [I48.91]  Antiphospholipid antibody syndrome (H) [D68.61]  Personal history of DVT (deep vein thrombosis) [Z86.718]  Atrial fibrillation  unspecified type (H) (Resolved) [I48.91]             Anticoagulation Episode Summary     INR check location:      Preferred lab:  EXTERNAL LAB    Send INR reminders to:  CHELSEA CHILEL    Comments:  JESSICA rodas to manage by exception      Anticoagulation Care Providers     Provider Role Specialty Phone number    Anya Nowak MD Referring Family Medicine [phone]          Macrina Avila, RN, BSN, PHN

## 2021-04-05 ENCOUNTER — VIRTUAL VISIT (OUTPATIENT)
Dept: NEUROPSYCHOLOGY | Facility: CLINIC | Age: 71
End: 2021-04-05
Attending: PSYCHIATRY & NEUROLOGY
Payer: COMMERCIAL

## 2021-04-05 DIAGNOSIS — F09 MENTAL DISORDER DUE TO GENERAL MEDICAL CONDITION: ICD-10-CM

## 2021-04-05 DIAGNOSIS — G20.A1 PARKINSON'S DISEASE (H): Primary | ICD-10-CM

## 2021-04-05 PROCEDURE — 96132 NRPSYC TST EVAL PHYS/QHP 1ST: CPT | Mod: GT

## 2021-04-05 PROCEDURE — 96138 PSYCL/NRPSYC TECH 1ST: CPT | Mod: GT

## 2021-04-05 PROCEDURE — 90791 PSYCH DIAGNOSTIC EVALUATION: CPT | Mod: GT

## 2021-04-05 PROCEDURE — 96133 NRPSYC TST EVAL PHYS/QHP EA: CPT | Mod: GT

## 2021-04-05 PROCEDURE — 96139 PSYCL/NRPSYC TST TECH EA: CPT | Mod: GT

## 2021-04-05 NOTE — LETTER
4/5/2021       RE: Haresh Rock  [address] [place] [address]     Dear Colleague,    Thank you for referring your patient, Haresh Rock, to the [facility] [place] at [facility]. Please see a copy of my visit note below.    Haresh Rock is a 70 year old who is being evaluated via a billable video visit.      How would you like to obtain your AVS? MyChart  If the video visit is dropped, the invitation should be resent by: Send to e-mail at: [email or web address]  Will anyone else be joining your video visit? No    Video Start Time: 12:38 PM    NEUROPSYCHOLOGICAL EVALUATION    RELEVANT HISTORY AND REASON FOR REFERRAL    Haresh Rock is a 70-year-old, right-handed retired  with a PhD.  Information was obtained via video and then telephone interview with the patient, and review of his medical records.  Records indicate a history of Parkinson's disease, including a DaTscan on 11/3/2020 that indicated a presynaptic dopaminergic deficit present bilaterally.  He was noted to have hypophonia since mid-2020, an articulation disorder, and weight loss.  He was not noticing tremor as of November.  He was referred for neuropsychological evaluation by Ángel Hill M.D., for further characterization of any cognitive difficulties, to evaluate mood, and to establish a neurocognitive baseline.    Precautions are in place related to COVID-19. A limited evaluation was administered remotely, using video technology. There were technical issues during the interview only, which ultimately led to converting the last portion of the interview to telephone.    CLINICAL INTERVIEW FINDINGS    Upon interview, Mr. Rock stated that he began experiencing problems with swallowing mid-summer last year, along with losing weight.  He notices a very slight tremor when he is holding something with his right hand.  He had one fall a couple of weeks ago.   He was picking up groceries that had been delivered to his front step.  As he turned, he caught his shoe on the threshold of the door.  Fortunately, the bag of groceries cushioned him when he fell, and he was not injured.    Mr. Rock has not been bothered by changes in cognition, including memory, attention or concentration, decision making, or organization.  On rare occasions he will notice difficulty with word finding.  He lives with his wife.  He continues to manage their finances, including doing the taxes.  He is working on the taxes now, and has found that it is hard to find the materials and he has not yet done his homework, but he knows that the materials are in a file.  He manages his own medications, setting reminders on his phone, watch, and iPad, and he denied any difficulty in this regard.  He drives without difficulty.  He prepares microwave TV dinners without a problem.  He handles his personal cares independently.    Mr. Rock denied any history of psychiatric illness.  He described his mood as good, and he does not feel depressed or anxious.  He does not think that he is any more irritable than normal, but his wife says that he is a grumpy old man.  He has not been crying.  He sleeps fairly well.  He has a sleep bia on his phone, which shows that he goes to bed at 11:30.  He sets his alarm for 8:30, and is usually awake by the time it goes off.  He wakes up 2-3 times in the early morning to use the bathroom.  He and his wife sleep in separate bedrooms, so he will listen to music or MPR when he wakes up, and sets his timer for 30 minutes.  He is usually asleep again by the time it turns off.  He does not think that he acts out his dreams.  He occasionally naps during the day.  His appetite has been low.  He has to chew so much and it is harder to swallow.  Also he is tired of the same routine and would like to be able to go out to a restaurant.  His energy level is lower than normal and he feels  tired more than normal.  He feels that he should exercise more.  He lives in a 5 level house and walks up and down the stairs.  His interest level is good, but he gets tired when he does something physical such as using the snowblower or doing yard work.  He denied suicidal ideation or any history of attempts to commit suicide.  He denied visual or auditory hallucinations.  He consumes 1-2 alcoholic beverages a year.  He does not use illicit drugs or tobacco.    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  He worked until he had a bone marrow transplant in 2007, when he began receiving disability.  He did not return to work after that, and officially retired in 2013.  In May, he and his wife will have been  for 50 years.  They have 2 children.    Mr. Rock denied any history of seizure, stroke, or head injury resulting loss of consciousness.  His balance has generally been good, although he gets dizzy when he gets up too fast, especially recently.  He has not had unilateral weakness or numbness.  He has neuropathy in both feet.  He noted that he took prednisone for 6 years after his bone marrow transplant, which led to diabetes.  He stopped taking the prednisone and another medication around 2012 when he went into hypoglycemic shock.  He has not been bothered by headaches or other aches or pains.    PAST MEDICAL HISTORY: Medical records indicate a history of atrial fibrillation, hyperlipidemia, polyneuropahty, s/p bone marrow transplant, chronic zcmup-elwhaj-bfbo disease, chronic kidney disease, Parkinson's disease, cirrhosis of the liver not due to alcohol, hemochromatosis, Hodgkin's lymphoma, pulmonary hypertension, diabetes mellitus, renal hypertension, thyroid nodule, paroxysmal atrial fibrillation, total knee replacement.      CURRENT MEDICATIONS:  Include acetaminophen, amoxicillin, bisacodyl, calcium  carbonate, flecainide, fluticasone, loratadine, metoprolol, multivitamin, nutritional supplement, polyethylene glycol, Prevident, senna-docusate, simvastatin, warfarin.    FAMILY MEDICAL HISTORY:  Significant for a father with dementia around the age of 90, who may have also had a stroke.    BEHAVIORAL OBSERVATIONS    This evaluation was undertaken remotely with the use of video technology.  Behavioral observations are somewhat limited for this reason. During the evaluation, he was pleasant, cooperative, and seemed to understand the instructions. He was alert and oriented to person, place, and time. No abnormal movements were observed clinically over the limited video connection. Affect was flat.  We had a series of problems with both the video and telephone connection, and he remained unfazed and was patient throughout the interview.  Speech was mildly dysarthric and hypophonic.  Spontaneous conversation was present and appropriate. Internal performance validity measures fell within normal limits. Results are interpreted with caution given the deviation from standard protocols, but are believed to be a reasonable estimate of functioning.    MEASURES ADMINISTERED    The following measures were administered by a trained psychometrist, remotely via video technology, under the direct supervision of a licensed psychologist:    Orientation; Subtests of the Wechsler Adult Intelligence Scale - 4; Reading subtest of the Wide Range Achievement Test - 4; Story Memory and Line Orientation of the Repeatable Battery for the Assessment of Neuropsychological Status (RBANS); Ace-Osterrieth Complex Figure; Traore Verbal Learning Test - Revised, Form 1; Marlin Naming Test; Controlled Oral Word Association Test; Semantic Fluency; Clock Drawing; Oral Trail Making Test; Test of Sustained Attention and Tracking; BDAE Complex Ideational Material; ILS Health and Safety Questions; Geriatric Depression Scale (GDS).    RESULTS AND  INTERPRETATION    Verbal intellectual functioning was estimated to fall in the above average range, consistent with premorbid estimates based on single word reading abilities.    Confrontation naming was above average for his age and level of education.  Expressive vocabulary was above average for his age.  Verbal abstract reasoning was above average.  Letter fluency was low average for his age and level of education, and generative naming to category was average.  Comprehension of complex ideational material fell within normal limits.    Attention span was average for his age.  Divided attention was high average.  Performance on a measure of sustained attention and tracking was high average.    Basic visual perception, including matching lines and angles, was high average for his age.  Construction of a clock fell within normal limits.  Construction of a complex design also fell within normal limits.  Nonverbal deductive reasoning was average.    Immediate recall of verbal narrative material was high average, with high average recall following a brief delay.  On a multiple trial list learning task, immediate recall was low average, with average retention (75%) following a 25-minute delay.    Responses to various health and safety scenarios fell well within normal limits.    On the GDS, self-report questionnaire, he endorsed minimal depressive symptomatology.    IMPRESSIONS AND RECOMMENDATIONS    Haresh Rock is a 70 year old man with a history of Parkinson's disease, with symptom onset in 2020 including hypophonia, articulation disorder, and weight loss. Current results are interpreted with caution. Due to concerns regarding COVID-19, the evaluation was administered remotely using a video platform. Every attempt was made to administer measures in a standardized manner; however, differences in administration from the standardization samples may affect interpretation.    Results indicate performance that falls within  normal limits across cognitive domains, generally in the above average range, including memory, attention, executive functioning, language, and visual processing. Verbal fluency falls entirely within normal limits, but reflects a relative weakness. He does not endorse significant depressive symptomatology.    This pattern of performance does not reflect dementia at this time. There is perhaps a subtle suggestion of subcortical system involvement, manifested in relatively slowed information processing speed. He is not reporting significant cognitive concerns, and he remains entirely independent in the management of his instrumental activities of daily living.     In terms of daily functioning, Mr. Rock is not experiencing cognitive difficulties that might interfere with his ability to actively participate in treatment or to manage his instrumental activities of daily living independently. He may find that it takes him longer to complete tasks, so it may be helpful to provide himself with ample time when working on a task, so that he does not become overwhelmed and work less efficiently.    Results may serve as a baseline of Mr. Rock's cognitive functioning. The evaluation may be repeated in the future for comparison should a change in mental status occur.       Mica Edwards, Ph.D., [facility]  Licensed Psychologist, [employer] [id number]  Board Certified in Clinical Neuropsychology    Time spent: One unit of professional time, including interview (CPT 42270). 60 minutes (1 unit) neuropsychological testing evaluation by licensed and board-certified neuropsychologist, including integration of patient data, interpretation of standardized test results and clinical data, clinical decision-making, treatment planning, report, and interactive feedback to the patient, first hour (CPT 03513). 91 minutes (2 units) of neuropsychological testing evaluation by licensed and board-certified neuropsychologist, including integration of patient  data, interpretation of standardized test results and clinical data, clinical decision-making, treatment planning, report, and interactive feedback to the patient, subsequent hours (CPT 67396). 30 minutes of neuropsychological test administration and scoring by technician, first 30 minutes (CPT 62742). 107 additional minutes (4 units) neuropsychological test administration and scoring by technician, subsequent 30 minutes (CPT 87271). ICD-10 Diagnoses: G20; F06.8.    Due to circumstances that prevent in-person clinical visits, this assessment was conducted using telehealth methods (including remote audiovisual presentation of test instructions and test stimuli). The standard administration of these procedures involves in-person, face-to-face methods. The impact of applying non-standard administration methods has been evaluated only in part by scientific research. While every effort was made to simulate standard assessment practices, the diagnostic conclusions and recommendations for treatment provided in this report are being advanced with these reservations.      Video-Visit Details    Type of service:  Video Visit    Video End Time (interview - switched to telephone at 12:53):1:13 PM    Originating Location (pt. Location): Home    Distant Location (provider location):  [facility] NEUROPSYCHOLOGY [place]     Platform used for Video Visit: [email or web address]        Again, thank you for allowing me to participate in the care of your patient.      Sincerely,    Mica Edwards, PhD LP

## 2021-04-05 NOTE — PROGRESS NOTES
Haresh Rock is a 70 year old who is being evaluated via a billable video visit.      How would you like to obtain your AVS? MyChart  If the video visit is dropped, the invitation should be resent by: Send to e-mail at: [email or web address]  Will anyone else be joining your video visit? No    Video Start Time: 12:38 PM    NEUROPSYCHOLOGICAL EVALUATION    RELEVANT HISTORY AND REASON FOR REFERRAL    Haresh Rock is a 70-year-old, right-handed retired  with a PhD.  Information was obtained via video and then telephone interview with the patient, and review of his medical records.  Records indicate a history of Parkinson's disease, including a DaTscan on 11/3/2020 that indicated a presynaptic dopaminergic deficit present bilaterally.  He was noted to have hypophonia since mid-2020, an articulation disorder, and weight loss.  He was not noticing tremor as of November.  He was referred for neuropsychological evaluation by Ángel Hill M.D., for further characterization of any cognitive difficulties, to evaluate mood, and to establish a neurocognitive baseline.    Precautions are in place related to COVID-19. A limited evaluation was administered remotely, using video technology. There were technical issues during the interview only, which ultimately led to converting the last portion of the interview to telephone.    CLINICAL INTERVIEW FINDINGS    Upon interview, Mr. Rock stated that he began experiencing problems with swallowing mid-summer last year, along with losing weight.  He notices a very slight tremor when he is holding something with his right hand.  He had one fall a couple of weeks ago.  He was picking up groceries that had been delivered to his front step.  As he turned, he caught his shoe on the threshold of the door.  Fortunately, the bag of groceries cushioned him when he fell, and he was not injured.    Mr. Rock has not been bothered by changes in cognition, including memory, attention or  concentration, decision making, or organization.  On rare occasions he will notice difficulty with word finding.  He lives with his wife.  He continues to manage their finances, including doing the taxes.  He is working on the taxes now, and has found that it is hard to find the materials and he has not yet done his homework, but he knows that the materials are in a file.  He manages his own medications, setting reminders on his phone, watch, and iPad, and he denied any difficulty in this regard.  He drives without difficulty.  He prepares microwave TV dinners without a problem.  He handles his personal cares independently.    Mr. Rock denied any history of psychiatric illness.  He described his mood as good, and he does not feel depressed or anxious.  He does not think that he is any more irritable than normal, but his wife says that he is a grumpy old man.  He has not been crying.  He sleeps fairly well.  He has a sleep bia on his phone, which shows that he goes to bed at 11:30.  He sets his alarm for 8:30, and is usually awake by the time it goes off.  He wakes up 2-3 times in the early morning to use the bathroom.  He and his wife sleep in separate bedrooms, so he will listen to music or MPR when he wakes up, and sets his timer for 30 minutes.  He is usually asleep again by the time it turns off.  He does not think that he acts out his dreams.  He occasionally naps during the day.  His appetite has been low.  He has to chew so much and it is harder to swallow.  Also he is tired of the same routine and would like to be able to go out to a restaurant.  His energy level is lower than normal and he feels tired more than normal.  He feels that he should exercise more.  He lives in a 5 level house and walks up and down the stairs.  His interest level is good, but he gets tired when he does something physical such as using the snowblower or doing yard work.  He denied suicidal ideation or any history of attempts to  commit suicide.  He denied visual or auditory hallucinations.  He consumes 1-2 alcoholic beverages a year.  He does not use illicit drugs or tobacco.    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  He worked until he had a bone marrow transplant in 2007, when he began receiving disability.  He did not return to work after that, and officially retired in 2013.  In May, he and his wife will have been  for 50 years.  They have 2 children.    Mr. Rock denied any history of seizure, stroke, or head injury resulting loss of consciousness.  His balance has generally been good, although he gets dizzy when he gets up too fast, especially recently.  He has not had unilateral weakness or numbness.  He has neuropathy in both feet.  He noted that he took prednisone for 6 years after his bone marrow transplant, which led to diabetes.  He stopped taking the prednisone and another medication around 2012 when he went into hypoglycemic shock.  He has not been bothered by headaches or other aches or pains.    PAST MEDICAL HISTORY: Medical records indicate a history of atrial fibrillation, hyperlipidemia, polyneuropahty, s/p bone marrow transplant, chronic azcmk-tpbjkl-saph disease, chronic kidney disease, Parkinson's disease, cirrhosis of the liver not due to alcohol, hemochromatosis, Hodgkin's lymphoma, pulmonary hypertension, diabetes mellitus, renal hypertension, thyroid nodule, paroxysmal atrial fibrillation, total knee replacement.      CURRENT MEDICATIONS:  Include acetaminophen, amoxicillin, bisacodyl, calcium carbonate, flecainide, fluticasone, loratadine, metoprolol, multivitamin, nutritional supplement, polyethylene glycol, Prevident, senna-docusate, simvastatin, warfarin.    FAMILY MEDICAL HISTORY:  Significant for a father with dementia around the age of 90, who may have also had a stroke.    BEHAVIORAL  OBSERVATIONS    This evaluation was undertaken remotely with the use of video technology.  Behavioral observations are somewhat limited for this reason. During the evaluation, he was pleasant, cooperative, and seemed to understand the instructions. He was alert and oriented to person, place, and time. No abnormal movements were observed clinically over the limited video connection. Affect was flat.  We had a series of problems with both the video and telephone connection, and he remained unfazed and was patient throughout the interview.  Speech was mildly dysarthric and hypophonic.  Spontaneous conversation was present and appropriate. Internal performance validity measures fell within normal limits. Results are interpreted with caution given the deviation from standard protocols, but are believed to be a reasonable estimate of functioning.    MEASURES ADMINISTERED    The following measures were administered by a trained psychometrist, remotely via video technology, under the direct supervision of a licensed psychologist:    Orientation; Subtests of the Wechsler Adult Intelligence Scale - 4; Reading subtest of the Wide Range Achievement Test - 4; Story Memory and Line Orientation of the Repeatable Battery for the Assessment of Neuropsychological Status (RBANS); Ace-Osterrieth Complex Figure; Traore Verbal Learning Test - Revised, Form 1; [place] Naming Test; Controlled Oral Word Association Test; Semantic Fluency; Clock Drawing; Oral Trail Making Test; Test of Sustained Attention and Tracking; BDAE Complex Ideational Material; ILS Health and Safety Questions; Geriatric Depression Scale (GDS).    RESULTS AND INTERPRETATION    Verbal intellectual functioning was estimated to fall in the above average range, consistent with premorbid estimates based on single word reading abilities.    Confrontation naming was above average for his age and level of education.  Expressive vocabulary was above average for his age.   Verbal abstract reasoning was above average.  Letter fluency was low average for his age and level of education, and generative naming to category was average.  Comprehension of complex ideational material fell within normal limits.    Attention span was average for his age.  Divided attention was high average.  Performance on a measure of sustained attention and tracking was high average.    Basic visual perception, including matching lines and angles, was high average for his age.  Construction of a clock fell within normal limits.  Construction of a complex design also fell within normal limits.  Nonverbal deductive reasoning was average.    Immediate recall of verbal narrative material was high average, with high average recall following a brief delay.  On a multiple trial list learning task, immediate recall was low average, with average retention (75%) following a 25-minute delay.    Responses to various health and safety scenarios fell well within normal limits.    On the GDS, self-report questionnaire, he endorsed minimal depressive symptomatology.    IMPRESSIONS AND RECOMMENDATIONS    Haresh Rock is a 70 year old man with a history of Parkinson's disease, with symptom onset in 2020 including hypophonia, articulation disorder, and weight loss. Current results are interpreted with caution. Due to concerns regarding COVID-19, the evaluation was administered remotely using a video platform. Every attempt was made to administer measures in a standardized manner; however, differences in administration from the standardization samples may affect interpretation.    Results indicate performance that falls within normal limits across cognitive domains, generally in the above average range, including memory, attention, executive functioning, language, and visual processing. Verbal fluency falls entirely within normal limits, but reflects a relative weakness. He does not endorse significant depressive  symptomatology.    This pattern of performance does not reflect dementia at this time. There is perhaps a subtle suggestion of subcortical system involvement, manifested in relatively slowed information processing speed. He is not reporting significant cognitive concerns, and he remains entirely independent in the management of his instrumental activities of daily living.     In terms of daily functioning, Mr. Rock is not experiencing cognitive difficulties that might interfere with his ability to actively participate in treatment or to manage his instrumental activities of daily living independently. He may find that it takes him longer to complete tasks, so it may be helpful to provide himself with ample time when working on a task, so that he does not become overwhelmed and work less efficiently.    Results may serve as a baseline of Mr. Rock's cognitive functioning. The evaluation may be repeated in the future for comparison should a change in mental status occur.       Mica Edwards, Ph.D., [facility]  Licensed Psychologist, [id number]  Board Certified in Clinical Neuropsychology    Time spent: One unit of professional time, including interview (CPT 84854). 60 minutes (1 unit) neuropsychological testing evaluation by licensed and board-certified neuropsychologist, including integration of patient data, interpretation of standardized test results and clinical data, clinical decision-making, treatment planning, report, and interactive feedback to the patient, first hour (CPT 59291). 91 minutes (2 units) of neuropsychological testing evaluation by licensed and board-certified neuropsychologist, including integration of patient data, interpretation of standardized test results and clinical data, clinical decision-making, treatment planning, report, and interactive feedback to the patient, subsequent hours (CPT 32508). 30 minutes of neuropsychological test administration and scoring by technician, first 30 minutes  (CPT 69576). 107 additional minutes (4 units) neuropsychological test administration and scoring by technician, subsequent 30 minutes (CPT 32425). ICD-10 Diagnoses: G20; F06.8.    Due to circumstances that prevent in-person clinical visits, this assessment was conducted using telehealth methods (including remote audiovisual presentation of test instructions and test stimuli). The standard administration of these procedures involves in-person, face-to-face methods. The impact of applying non-standard administration methods has been evaluated only in part by scientific research. While every effort was made to simulate standard assessment practices, the diagnostic conclusions and recommendations for treatment provided in this report are being advanced with these reservations.      Video-Visit Details    Type of service:  Video Visit    Video End Time (interview - switched to telephone at 12:53):1:13 PM    Originating Location (pt. Location): Home    Distant Location (provider location):  [facility] NEUROPSYCHOLOGY [place]     Platform used for Video Visit: [email or web address]

## 2021-04-05 NOTE — NURSING NOTE
The patient was seen for a video neuropsychological evaluation at the request of Dr. Ángel Hill, for the purposes of diagnostic clarification and treatment planning. 137 minutes of test administration and scoring were provided by this writer, Wiliam Pascal. Please see Dr. Mica Edwards's report for a full interpretation of the findings.

## 2021-04-05 NOTE — LETTER
4/5/2021       RE: Haresh Rock  [address] [place] [address]     Dear Colleague,    Thank you for referring your patient, Haresh Rock, to the [facility] NEUROPSYCHOLOGY [place] at [facility]. Please see a copy of my visit note below.    Haresh Rock is a 70 year old who is being evaluated via a billable video visit.      How would you like to obtain your AVS? MyChart  If the video visit is dropped, the invitation should be resent by: Send to e-mail at: [email or web address]  Will anyone else be joining your video visit? No    Video Start Time: 12:38 PM    NEUROPSYCHOLOGICAL EVALUATION    RELEVANT HISTORY AND REASON FOR REFERRAL    Haresh Rock is a 70-year-old, right-handed retired  with a PhD.  Information was obtained via video and then telephone interview with the patient, and review of his medical records.  Records indicate a history of Parkinson's disease, including a DaTscan on 11/3/2020 that indicated a presynaptic dopaminergic deficit present bilaterally.  He was noted to have hypophonia since mid-2020, an articulation disorder, and weight loss.  He was not noticing tremor as of November.  He was referred for neuropsychological evaluation by Ángel Hill M.D., for further characterization of any cognitive difficulties, to evaluate mood, and to establish a neurocognitive baseline.    Precautions are in place related to COVID-19. A limited evaluation was administered remotely, using video technology. There were technical issues during the interview only, which ultimately led to converting the last portion of the interview to telephone.    CLINICAL INTERVIEW FINDINGS    Upon interview, Mr. Rock stated that he began experiencing problems with swallowing mid-summer last year, along with losing weight.  He notices a very slight tremor when he is holding something with his right hand.  He had one fall a couple of weeks ago.   He was picking up groceries that had been delivered to his front step.  As he turned, he caught his shoe on the threshold of the door.  Fortunately, the bag of groceries cushioned him when he fell, and he was not injured.    Mr. Rock has not been bothered by changes in cognition, including memory, attention or concentration, decision making, or organization.  On rare occasions he will notice difficulty with word finding.  He lives with his wife.  He continues to manage their finances, including doing the taxes.  He is working on the taxes now, and has found that it is hard to find the materials and he has not yet done his homework, but he knows that the materials are in a file.  He manages his own medications, setting reminders on his phone, watch, and iPad, and he denied any difficulty in this regard.  He drives without difficulty.  He prepares microwave TV dinners without a problem.  He handles his personal cares independently.    Mr. Rock denied any history of psychiatric illness.  He described his mood as good, and he does not feel depressed or anxious.  He does not think that he is any more irritable than normal, but his wife says that he is a grumpy old man.  He has not been crying.  He sleeps fairly well.  He has a sleep bia on his phone, which shows that he goes to bed at 11:30.  He sets his alarm for 8:30, and is usually awake by the time it goes off.  He wakes up 2-3 times in the early morning to use the bathroom.  He and his wife sleep in separate bedrooms, so he will listen to music or MPR when he wakes up, and sets his timer for 30 minutes.  He is usually asleep again by the time it turns off.  He does not think that he acts out his dreams.  He occasionally naps during the day.  His appetite has been low.  He has to chew so much and it is harder to swallow.  Also he is tired of the same routine and would like to be able to go out to a restaurant.  His energy level is lower than normal and he feels  tired more than normal.  He feels that he should exercise more.  He lives in a 5 level house and walks up and down the stairs.  His interest level is good, but he gets tired when he does something physical such as using the snowblower or doing yard work.  He denied suicidal ideation or any history of attempts to commit suicide.  He denied visual or auditory hallucinations.  He consumes 1-2 alcoholic beverages a year.  He does not use illicit drugs or tobacco.    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  He worked until he had a bone marrow transplant in 2007, when he began receiving disability.  He did not return to work after that, and officially retired in 2013.  In May, he and his wife will have been  for 50 years.  They have 2 children.    Mr. Rock denied any history of seizure, stroke, or head injury resulting loss of consciousness.  His balance has generally been good, although he gets dizzy when he gets up too fast, especially recently.  He has not had unilateral weakness or numbness.  He has neuropathy in both feet.  He noted that he took prednisone for 6 years after his bone marrow transplant, which led to diabetes.  He stopped taking the prednisone and another medication around 2012 when he went into hypoglycemic shock.  He has not been bothered by headaches or other aches or pains.    PAST MEDICAL HISTORY: Medical records indicate a history of atrial fibrillation, hyperlipidemia, polyneuropahty, s/p bone marrow transplant, chronic iaqxq-gnbomm-ghya disease, chronic kidney disease, Parkinson's disease, cirrhosis of the liver not due to alcohol, hemochromatosis, Hodgkin's lymphoma, pulmonary hypertension, diabetes mellitus, renal hypertension, thyroid nodule, paroxysmal atrial fibrillation, total knee replacement.      CURRENT MEDICATIONS:  Include acetaminophen, amoxicillin, bisacodyl, calcium  carbonate, flecainide, fluticasone, loratadine, metoprolol, multivitamin, nutritional supplement, polyethylene glycol, Prevident, senna-docusate, simvastatin, warfarin.    FAMILY MEDICAL HISTORY:  Significant for a father with dementia around the age of 90, who may have also had a stroke.    BEHAVIORAL OBSERVATIONS    This evaluation was undertaken remotely with the use of video technology.  Behavioral observations are somewhat limited for this reason. During the evaluation, he was pleasant, cooperative, and seemed to understand the instructions. He was alert and oriented to person, place, and time. No abnormal movements were observed clinically over the limited video connection. Affect was flat.  We had a series of problems with both the video and telephone connection, and he remained unfazed and was patient throughout the interview.  Speech was mildly dysarthric and hypophonic.  Spontaneous conversation was present and appropriate. Internal performance validity measures fell within normal limits. Results are interpreted with caution given the deviation from standard protocols, but are believed to be a reasonable estimate of functioning.    MEASURES ADMINISTERED    The following measures were administered by a trained psychometrist, remotely via video technology, under the direct supervision of a licensed psychologist:    Orientation; Subtests of the Wechsler Adult Intelligence Scale - 4; Reading subtest of the Wide Range Achievement Test - 4; Story Memory and Line Orientation of the Repeatable Battery for the Assessment of Neuropsychological Status (RBANS); Ace-Osterrieth Complex Figure; Traore Verbal Learning Test - Revised, Form 1; Lares Naming Test; Controlled Oral Word Association Test; Semantic Fluency; Clock Drawing; Oral Trail Making Test; Test of Sustained Attention and Tracking; BDAE Complex Ideational Material; ILS Health and Safety Questions; Geriatric Depression Scale (GDS).    RESULTS AND  INTERPRETATION    Verbal intellectual functioning was estimated to fall in the above average range, consistent with premorbid estimates based on single word reading abilities.    Confrontation naming was above average for his age and level of education.  Expressive vocabulary was above average for his age.  Verbal abstract reasoning was above average.  Letter fluency was low average for his age and level of education, and generative naming to category was average.  Comprehension of complex ideational material fell within normal limits.    Attention span was average for his age.  Divided attention was high average.  Performance on a measure of sustained attention and tracking was high average.    Basic visual perception, including matching lines and angles, was high average for his age.  Construction of a clock fell within normal limits.  Construction of a complex design also fell within normal limits.  Nonverbal deductive reasoning was average.    Immediate recall of verbal narrative material was high average, with high average recall following a brief delay.  On a multiple trial list learning task, immediate recall was low average, with average retention (75%) following a 25-minute delay.    Responses to various health and safety scenarios fell well within normal limits.    On the GDS, self-report questionnaire, he endorsed minimal depressive symptomatology.    IMPRESSIONS AND RECOMMENDATIONS    Haresh Rock is a 70 year old man with a history of Parkinson's disease, with symptom onset in 2020 including hypophonia, articulation disorder, and weight loss. Current results are interpreted with caution. Due to concerns regarding COVID-19, the evaluation was administered remotely using a video platform. Every attempt was made to administer measures in a standardized manner; however, differences in administration from the standardization samples may affect interpretation.    Results indicate performance that falls within  normal limits across cognitive domains, generally in the above average range, including memory, attention, executive functioning, language, and visual processing. Verbal fluency falls entirely within normal limits, but reflects a relative weakness. He does not endorse significant depressive symptomatology.    This pattern of performance does not reflect dementia at this time. There is perhaps a subtle suggestion of subcortical system involvement, manifested in relatively slowed information processing speed. He is not reporting significant cognitive concerns, and he remains entirely independent in the management of his instrumental activities of daily living.     In terms of daily functioning, Mr. Rock is not experiencing cognitive difficulties that might interfere with his ability to actively participate in treatment or to manage his instrumental activities of daily living independently. He may find that it takes him longer to complete tasks, so it may be helpful to provide himself with ample time when working on a task, so that he does not become overwhelmed and work less efficiently.    Results may serve as a baseline of Mr. Rock's cognitive functioning. The evaluation may be repeated in the future for comparison should a change in mental status occur.       Mica Edwards, Ph.D., [facility]  Licensed Psychologist, LP [id number]  Board Certified in Clinical Neuropsychology    Time spent: One unit of professional time, including interview (CPT 88609). 60 minutes (1 unit) neuropsychological testing evaluation by licensed and board-certified neuropsychologist, including integration of patient data, interpretation of standardized test results and clinical data, clinical decision-making, treatment planning, report, and interactive feedback to the patient, first hour (CPT 77664). 91 minutes (2 units) of neuropsychological testing evaluation by licensed and board-certified neuropsychologist, including integration of patient  data, interpretation of standardized test results and clinical data, clinical decision-making, treatment planning, report, and interactive feedback to the patient, subsequent hours (CPT 99332). 30 minutes of neuropsychological test administration and scoring by technician, first 30 minutes (CPT 83551). 107 additional minutes (4 units) neuropsychological test administration and scoring by technician, subsequent 30 minutes (CPT 05008). ICD-10 Diagnoses: G20; F06.8.    Due to circumstances that prevent in-person clinical visits, this assessment was conducted using telehealth methods (including remote audiovisual presentation of test instructions and test stimuli). The standard administration of these procedures involves in-person, face-to-face methods. The impact of applying non-standard administration methods has been evaluated only in part by scientific research. While every effort was made to simulate standard assessment practices, the diagnostic conclusions and recommendations for treatment provided in this report are being advanced with these reservations.      Video-Visit Details    Type of service:  Video Visit    Video End Time (interview - switched to telephone at 12:53):1:13 PM    Originating Location (pt. Location): Home    Distant Location (provider location):  [facility] NEUROPSYCHOLOGY [place]     Platform used for Video Visit: Delia/Tre      Haresh is a 70 year old who is being evaluated via a billable video visit.      How would you like to obtain your AVS? {AVS Preference:943159}  If the video visit is dropped, the invitation should be resent by: Send to e-mail at: [email or web address]  Will anyone else be joining your video visit? {:415327}  {If patient encounters technical issues they should call [phone] :351117}    Video Start Time: 12:37 PM    {PROVIDER CHARTING PREFERENCE:624540}    Arsh Hutson is a 70 year old who presents for the following health issues {ACCOMPANIED BY  "STATEMENT (Optional):264150}    HPI     ***    Review of Systems   {ROS COMP (Optional):048971}      Objective           Vitals:  No vitals were obtained today due to virtual visit.    Physical Exam   {video visit exam brief selected:669106::\"GENERAL: Healthy, alert and no distress\",\"EYES: Eyes grossly normal to inspection.  No discharge or erythema, or obvious scleral/conjunctival abnormalities.\",\"RESP: No audible wheeze, cough, or visible cyanosis.  No visible retractions or increased work of breathing.  \",\"SKIN: Visible skin clear. No significant rash, abnormal pigmentation or lesions.\",\"NEURO: Cranial nerves grossly intact.  Mentation and speech appropriate for age.\",\"PSYCH: Mentation appears normal, affect normal/bright, judgement and insight intact, normal speech and appearance well-groomed.\"}    {Diagnostic Test Results (Optional):687702}    {AMBULATORY ATTESTATION (Optional):782206}        Video-Visit Details    Type of service:  Video Visit    Video End Time:{video visit start/end time for provider to select:998653}    Originating Location (pt. Location): {video visit patient location:948550::\"Home\"}    Distant Location (provider location):  [facility] NEUROPSYCHOLOGY [place]     Platform used for Video Visit: {Virtual Visit Platforms:435489::\"[employer]\"}        Again, thank you for allowing me to participate in the care of your patient.      Sincerely,    Mica Edwards, PhD LP    "

## 2021-04-06 ENCOUNTER — MYC MEDICAL ADVICE (OUTPATIENT)
Dept: FAMILY MEDICINE | Facility: CLINIC | Age: 71
End: 2021-04-06

## 2021-04-06 ENCOUNTER — VIRTUAL VISIT (OUTPATIENT)
Dept: TRANSPLANT | Facility: CLINIC | Age: 71
End: 2021-04-06
Attending: INTERNAL MEDICINE
Payer: COMMERCIAL

## 2021-04-06 ENCOUNTER — TELEPHONE (OUTPATIENT)
Dept: GASTROENTEROLOGY | Facility: CLINIC | Age: 71
End: 2021-04-06

## 2021-04-06 DIAGNOSIS — I48.91 ATRIAL FIBRILLATION (H): ICD-10-CM

## 2021-04-06 DIAGNOSIS — Z79.01 LONG TERM CURRENT USE OF ANTICOAGULANT THERAPY: ICD-10-CM

## 2021-04-06 DIAGNOSIS — D68.61 ANTIPHOSPHOLIPID ANTIBODY SYNDROME (H): ICD-10-CM

## 2021-04-06 DIAGNOSIS — E83.110 HEREDITARY HEMOCHROMATOSIS (H): ICD-10-CM

## 2021-04-06 DIAGNOSIS — C81.99 HODGKIN'S DISEASE OF EXTRANODAL OR SOLID ORGAN SITE (H): Primary | ICD-10-CM

## 2021-04-06 DIAGNOSIS — Z86.0100 HISTORY OF COLONIC POLYPS: Primary | ICD-10-CM

## 2021-04-06 DIAGNOSIS — Z86.718 PERSONAL HISTORY OF DVT (DEEP VEIN THROMBOSIS): ICD-10-CM

## 2021-04-06 DIAGNOSIS — D89.811 GRAFT-VERSUS-HOST DISEASE, CHRONIC (H): ICD-10-CM

## 2021-04-06 PROCEDURE — 99213 OFFICE O/P EST LOW 20 MIN: CPT | Mod: 95 | Performed by: INTERNAL MEDICINE

## 2021-04-06 RX ORDER — BISACODYL 5 MG/1
TABLET, DELAYED RELEASE ORAL
Qty: 4 TABLET | Refills: 0 | Status: SHIPPED | OUTPATIENT
Start: 2021-04-06 | End: 2021-10-08

## 2021-04-06 NOTE — TELEPHONE ENCOUNTER
Patient has colonscopy on 4/14/21.  He will be having procedure at [facility] with Reg Rivera.      Routing to [facility] for bridge plan    Blanca Rodriguez RN

## 2021-04-06 NOTE — TELEPHONE ENCOUNTER
Spoke with Haresh and communicated the plan below.    ACC to follow up with Haresh on 04/09 with instructions for 4 vs. 5 day hold based on 04/09's INR.

## 2021-04-06 NOTE — TELEPHONE ENCOUNTER
[facility]-PROCEDURAL ANTICOAGULATION  MANAGEMENT    Assessment     Warfarin interruption plan for colonoscopy on 2021.    A. Fib, DVT and PE       Risk stratification for thromboembolism: moderate (2017 ACC periprocedure pathway for NVAF Expert Consensus)      [id number] = 5-6 (HTN, Age+, DM, HX VTE++, +/- vasc Disease)     Lupus anticoagulant + weak 2005, 2005, 2005, but on retest, negative 2x in     AFIB: 2019 AHA/ACC/HRS update of Afib guidelines recommend for patients with Afib without mechanical valves who required interruption of warfarin decisions about bridging should balance risks of stroke and bleeding and the duration of time a patient will not be anticoagulated.  They note that bridging maybe be appropriate only in patients with a very high thromboembolic risk    Plan       Pre-Procedure:    Hold warfarin until after procedure startin/10/2021        Post-Procedure:    Resume home warfarin dose if okay with provider doing procedure on night of procedure, or first 2 nights cleared to resume warfarin:  2021 PM: 5mg, and 04/15/2021, 5mg    Recheck INR 7 days after resuming warfarin   ?   [facility]    Subjective/Objective:      Haresh Rock, a 70 year old male    Reason for Anticoagulation: A. Fib, DVT and PE    Goal INR Range: 2.0-3.0    Patient bridged in past: Yes:     Pertinent History: N/A    Wt Readings from Last 3 Encounters:   21 71.5 kg (157 lb 10.1 oz)   21 72.2 kg (159 lb 3.2 oz)   10/07/20 79.4 kg (175 lb)        Ideal body weight: 77.6 kg (171 lb 1.2 oz)     Lab Results   Component Value Date    INR 2.2 (A) 2021    INR 2.2 (A) 2021    INR 1.5 (A) 2021     Lab Results   Component Value Date    HGB 15.0 2021    HCT 43.9 2021     2021     Lab Results   Component Value Date    CR 1.18 02/15/2021    CR 1.21 01/15/2021    CR 1.46 (H) 2020     CrCl cannot be calculated (Patient's  most recent lab result is older than the maximum 10 days allowed.).

## 2021-04-06 NOTE — TELEPHONE ENCOUNTER
Sent for review by hematologist Dr. Miller managing Haresh:    Xiomara this looks good   Thanks   ashia    Message text      If INR higher than has been runnning, consider 5 day hold, otherwise if INR remains low 2's, 4 day hold.  Also note 2x 5mg boost based on previous holds & restarts.    Xiomara Tello, PharmD BCACP  Anticoagulation Clinical Pharmacist

## 2021-04-06 NOTE — PROGRESS NOTES
Haresh is a 70 year old who is being evaluated via a billable video visit.      How would you like to obtain your AVS? MyChart  If the video visit is dropped, the invitation should be resent by: Send to e-mail at: [email or web address]  Will anyone else be joining your video visit? 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  When I saw Haresh back in July, he was having trouble swallowing.  He had a video esophagram done and evaluation by speech therapy.  He was found to have difficulty swallowing with aspiration.  There was a question of whether or not he had Parkinson's.  He was seen by Dr. Ángel Hill.  He was found to have a bit of a shuffling gait.  He has no tremor though.  He has noticed that his handwriting has gotten smaller.  He did have a dopaminergic brain scan which showed a decrease in dopaminergic staining consistent with early Parkinson's.  He has not been given any treatment.  He has been eating regular food, but he avoids certain foods at that may stick in his upper esophagus.  He has no trouble with jello or applesauce.  He states if he chews chicken or fish he can swallow that well.  He has lost 40 pounds.  He is quite concerned about the weight loss and is wondering whether or not there might be another cause for the weight loss.  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     INTERVAL HISTORY  Haresh had extensive workup.with blood work PSA ok CBC OK Has CRP elevated. PET scan showed inflammation in perianal area but NO HODGKINs Haresh had a  sigmoidoscopy showing some erythema in sigmoid colon.Bx showed melanosis coli and a tubular adenoma polyp He is schedule next  week for a colonoscopyNot eating much, no appetite and has difficulty with swallowing  Fluids OK but does aspirate at times Had neuropsych testing yesterday. Has no tremor. Has some right hip pain. No appetite. No cough no chest pain no A fib. No covid sx. No fevers.    PAST MEDICAL HISTORY:  See my note from 02/2021    SOCIAL HISTORY:  See my note from 02/2021     FAMILY HISTORY:  See my note from  02/2021     REVIEW OF SYSTEMS:  A 12-point review of systems is done and is negative, except as in the HPI.     PHYSICAL EXAMINATION:  By the video, he is an alert gentleman, verbal, in no acute distress.     EYES:  Grossly normal to inspection, no discharge, erythema or icterus.   SKIN:  Visible skin is clear.  No significant rash or abnormal pigmentation.   NEUROLOGIC:  Cranial nerves seem to be intact.  Mentation and speech is appropriate for age.   PSYCHIATRIC:  Mentation appears normal.  He does not seem anxious today.         ASSESSMENT:     1.  Myeloproliferative syndrome/MDS. JAL 2 positive  2.  Status post non-myeloablative allo-sib peripheral blood stem cell transplant.   3.  History of chronic ssyou-jggobo-jtnf disease.   4.  History of Hodgkin's disease.   5.  History of cardiac arrhythmias, SVT and V tach.   6.  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   7.  History of cirrhosis.   8.  History of pulmonary emboli.   9.  History of elevated hemoglobin A1c.   10.  Thyroid nodule.    11.  Cholelithiasis.     12.  Diverticulosis.   13.  Anticoagulation.     14.  Status post bilateral knee replacement.     15.  Aortic stenosis  16. 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  17. Seborrheic keratosis of the back.  18 Weight loss  19.Dysphagia  20. Parkinson'  21 Anorexia  22. BPH sx    ASSESSMENT:    I think the  primary cause for his weight loss is decreased caloric intake.. The parkinsons has been quite devastating. Has sl tremor and some hand writing been a problem.Worried about depression.  Will review colonoscopy. Needs to follow up with Dr Hill and more nutrition consult.The PET scans and sigmoidoscopy suggest no Hodgkins and no GVH  INR 2.2       Needs to stay on warfarin with hx of DVT and PE  RTC in 4 months  I spent a total of 25  minutes face to face with Haresh Rock during today's office visit. Over 50% of this time was spent counseling the patient and coordinating the care regarding weight loss parkinson's and dysphagia    Augusto Miller MD   [phone]                Video Start Time: 945  Video-Visit Details    Type of service:  Video Visit    Video End Time:1010    Originating Location (pt. Location): home    Distant Location (provider location):  [facility] BLOOD AND MARROW TRANSPLANT PROGRAM [place]     Platform used for Video Visit: [employer]

## 2021-04-06 NOTE — TELEPHONE ENCOUNTER
Writer reviewed pre-assessment questions with patient prior to upcoming colonoscopy on 4.14.2021.  COVID test scheduled for 4.12.2021.    Reviewed golytely prep instructions with patient.  Noting no nuts, seeds, or popcorn 7 days prior to procedure.     [facility]  [facility] prep sent to [facility].    Pt instructed to talk with PCP regarding Coumadin dosing prior to procedure.    Patient verbalized understanding.  No further questions or concerns.    Becky Olea RN

## 2021-04-06 NOTE — LETTER
4/6/2021         RE: Haresh Rock  [address] [place] [address]        Dear Colleague,    Thank you for referring your patient, Haresh Rock, to the [facility] [place]. Please see a copy of my visit note below.    Haresh is a 70 year old who is being evaluated via a billable video visit.      How would you like to obtain your AVS? MyChart  If the video visit is dropped, the invitation should be resent by: Send to e-mail at: [email or web address]  Will anyone else be joining your video visit? 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  When I saw Haresh back in July, he was having trouble swallowing.  He had a video esophagram done and evaluation by speech therapy.  He was found to have difficulty swallowing with aspiration.  There was a question of whether or not he had Parkinson's.  He was seen by Dr. Ángel Hill.  He was found to have a bit of a shuffling gait.  He has no tremor though.  He has noticed that his handwriting has gotten smaller.  He did have a dopaminergic brain scan which showed a decrease in dopaminergic staining consistent with early Parkinson's.  He has not been given any treatment.  He has been eating regular food, but he avoids certain foods at that may stick in his upper esophagus.  He has no trouble with jello or applesauce.  He states if he chews chicken or fish he can swallow that well.  He has lost 40 pounds.  He is quite concerned about the weight loss and is wondering  whether or not there might be another cause for the weight loss.  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     INTERVAL HISTORY  Haresh had extensive workup.with blood work PSA ok CBC OK Has CRP elevated. PET scan showed inflammation in perianal area but NO HODGKINs Haresh had a sigmoidoscopy showing some erythema in sigmoid colon.Bx showed melanosis coli and a tubular adenoma polyp He is schedule next  week for a colonoscopyNot eating much, no appetite and has difficulty with swallowing  Fluids OK but does aspirate at times Had neuropsych testing yesterday. Has no tremor. Has some right hip pain. No appetite. No cough no chest pain no A fib. No covid sx. No fevers.    PAST MEDICAL HISTORY:  See my note from 02/2021    SOCIAL HISTORY:  See my note from 02/2021     FAMILY HISTORY:  See my note from  02/2021     REVIEW OF SYSTEMS:  A 12-point review of systems is done and is negative, except as in the HPI.     PHYSICAL EXAMINATION:  By the video, he is an alert gentleman, verbal, in no acute distress.     EYES:  Grossly normal to inspection, no discharge, erythema or icterus.   SKIN:  Visible skin is clear.  No significant rash or abnormal pigmentation.   NEUROLOGIC:  Cranial nerves seem to be intact.  Mentation and speech is appropriate for age.   PSYCHIATRIC:  Mentation appears normal.  He does not seem anxious today.         ASSESSMENT:     1.  Myeloproliferative syndrome/MDS. JAL 2 positive  2.  Status post non-myeloablative allo-sib peripheral blood stem cell transplant.   3.  History of chronic secnd-lrwfqt-iido disease.   4.  History of Hodgkin's disease.   5.  History of cardiac arrhythmias, SVT and V tach.   6.  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   7.  History of cirrhosis.   8.  History of pulmonary emboli.   9.  History of elevated hemoglobin A1c.   10.  Thyroid nodule.    11.  Cholelithiasis.     12.  Diverticulosis.   13.  Anticoagulation.     14.   Status post bilateral knee replacement.     15.  Aortic stenosis  16. 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  17. Seborrheic keratosis of the back.  18 Weight loss  19.Dysphagia  20. Parkinson'  21 Anorexia  22. BPH sx    ASSESSMENT:    I think the primary cause for his weight loss is decreased caloric intake.. The parkinsons has been quite devastating. Has sl tremor and some hand writing been a problem.Worried about depression.  Will review colonoscopy. Needs to follow up with Dr Hill and more nutrition consult.The PET scans and sigmoidoscopy suggest no Hodgkins and no GVH  INR 2.2       Needs to stay on warfarin with hx of DVT and PE  RTC in 4 months  I spent a total of 25  minutes face to face with Haresh Rock during today's office visit. Over 50% of this time was spent counseling the patient and coordinating the care regarding weight loss parkinson's and dysphagia    Augusto Miller MD   [phone]                Video Start Time: 945  Video-Visit Details    Type of service:  Video Visit    Video End Time:1010    Originating Location (pt. Location): home    Distant Location (provider location):  [facility] BLO[facility] MARROW TRANSPLANT PRO[facility] [place]     Platform used for Video Visit: well      Again, thank you for allowing me to participate in the care of your patient.        Sincerely,        Augusto Miller MD

## 2021-04-09 ENCOUNTER — ANTICOAGULATION THERAPY VISIT (OUTPATIENT)
Dept: FAMILY MEDICINE | Facility: CLINIC | Age: 71
End: 2021-04-09

## 2021-04-09 ENCOUNTER — TRANSFERRED RECORDS (OUTPATIENT)
Dept: HEALTH INFORMATION MANAGEMENT | Facility: CLINIC | Age: 71
End: 2021-04-09

## 2021-04-09 DIAGNOSIS — Z86.718 PERSONAL HISTORY OF DVT (DEEP VEIN THROMBOSIS): ICD-10-CM

## 2021-04-09 DIAGNOSIS — I48.91 ATRIAL FIBRILLATION (H): ICD-10-CM

## 2021-04-09 DIAGNOSIS — Z79.01 LONG TERM CURRENT USE OF ANTICOAGULANT THERAPY: ICD-10-CM

## 2021-04-09 LAB — INR PPP: 2 (ref 0.9–1.1)

## 2021-04-09 NOTE — PROGRESS NOTES
ANTICOAGULATION MANAGEMENT     Patient Name:  Haresh Rock  Date:  2021    ASSESSMENT /SUBJECTIVE:    Today's INR result of 2.0 is therapeutic. Goal INR of 2.0-3.0      Warfarin dose taken: Warfarin taken as instructed    Diet: No new diet changes affecting INR    Medication changes/ interactions: No new medications/supplements affecting INR    Previous INR: Therapeutic     S/S of bleeding or thromboembolism: No    New injury or illness: No    Upcoming surgery, procedure or cardioversion: No    Additional findings: None      PLAN:    Telephone call with Haresh regarding INR result and instructed:     Warfarin Dosing Instructions: Take 2.5mg tonight, then hold. Instructed to boost -04/15 with 5mg each day.    Instructed patient to follow up no later than: 1 week  Patient to recheck with home meter    Education provided: Target INR goal and significance of current INR result      Haresh verbalizes understanding and agrees to warfarin dosing plan.    Instructed to call the Anticoagulation Clinic for any changes, questions or concerns. (#[phone])        Hari Saavedra RN      OBJECTIVE:  Recent labs: (last 7 days)     21   INR 2.0*         No question data found.  Anticoagulation Summary  As of 2021    INR goal:  2.0-3.0   TTR:  65.7 % (1 y)   INR used for dosin.0 (2021)   Warfarin maintenance plan:  2.5 mg (2.5 mg x 1) every day   Full warfarin instructions:  4/10: Hold; : Hold; : Hold; : Hold; : 5 mg; 4/15: 5 mg; Otherwise 2.5 mg every day   Weekly warfarin total:  17.5 mg   No change documented:  Hari Saavedra, RN   Plan last modified:  Allyn Doe RN (3/5/2021)   Next INR check:  2021   Priority:  High   Target end date:  Indefinite    Indications    Long-term (current) use of anticoagulants [Z79.01] [Z79.01]  Atrial fibrillation (H) [I48.91]  Antiphospholipid antibody syndrome (H) (Resolved) [D68.61]  Personal history of DVT (deep vein thrombosis)  [Z86.718]  Atrial fibrillation  unspecified type (H) (Resolved) [I48.91]             Anticoagulation Episode Summary     INR check location:      Preferred lab:  EXTERNAL LAB    Send INR reminders to:  CHELSEA CHILEL    Comments:  JESSICA okay to manage by exception      Anticoagulation Care Providers     Provider Role Specialty Phone number    Anya Nowak MD Referring Family Medicine [phone]

## 2021-04-12 DIAGNOSIS — Z11.59 ENCOUNTER FOR SCREENING FOR OTHER VIRAL DISEASES: ICD-10-CM

## 2021-04-12 LAB
SARS-COV-2 RNA RESP QL NAA+PROBE: NORMAL
SPECIMEN SOURCE: NORMAL

## 2021-04-12 PROCEDURE — U0005 INFEC AGEN DETEC AMPLI PROBE: HCPCS | Performed by: INTERNAL MEDICINE

## 2021-04-12 PROCEDURE — U0003 INFECTIOUS AGENT DETECTION BY NUCLEIC ACID (DNA OR RNA); SEVERE ACUTE RESPIRATORY SYNDROME CORONAVIRUS 2 (SARS-COV-2) (CORONAVIRUS DISEASE [COVID-19]), AMPLIFIED PROBE TECHNIQUE, MAKING USE OF HIGH THROUGHPUT TECHNOLOGIES AS DESCRIBED BY CMS-2020-01-R: HCPCS | Performed by: INTERNAL MEDICINE

## 2021-04-14 ENCOUNTER — HOSPITAL ENCOUNTER (OUTPATIENT)
Facility: CLINIC | Age: 71
Discharge: HOME | End: 2021-04-14
Attending: INTERNAL MEDICINE | Admitting: INTERNAL MEDICINE
Payer: COMMERCIAL

## 2021-04-14 ENCOUNTER — DOCUMENTATION ONLY (OUTPATIENT)
Dept: FAMILY MEDICINE | Facility: CLINIC | Age: 71
End: 2021-04-14

## 2021-04-14 VITALS
RESPIRATION RATE: 14 BRPM | HEART RATE: 68 BPM | SYSTOLIC BLOOD PRESSURE: 129 MMHG | DIASTOLIC BLOOD PRESSURE: 83 MMHG | BODY MASS INDEX: 20.93 KG/M2 | TEMPERATURE: 97.7 F | WEIGHT: 154.54 LBS | OXYGEN SATURATION: 99 % | HEIGHT: 72 IN

## 2021-04-14 LAB
COLONOSCOPY: NORMAL
GLUCOSE BLDC GLUCOMTR-MCNC: 71 MG/DL (ref 70–99)
INR PPP: 1.48 (ref 0.86–1.14)

## 2021-04-14 PROCEDURE — 45378 DIAGNOSTIC COLONOSCOPY: CPT | Performed by: INTERNAL MEDICINE

## 2021-04-14 PROCEDURE — 36415 COLL VENOUS BLD VENIPUNCTURE: CPT | Performed by: INTERNAL MEDICINE

## 2021-04-14 PROCEDURE — G0500 MOD SEDAT ENDO SERVICE >5YRS: HCPCS | Performed by: INTERNAL MEDICINE

## 2021-04-14 PROCEDURE — 82962 GLUCOSE BLOOD TEST: CPT

## 2021-04-14 PROCEDURE — 85610 PROTHROMBIN TIME: CPT | Performed by: INTERNAL MEDICINE

## 2021-04-14 PROCEDURE — 250N000011 HC RX IP 250 OP 636: Performed by: INTERNAL MEDICINE

## 2021-04-14 RX ORDER — NALOXONE HYDROCHLORIDE 0.4 MG/ML
0.2 INJECTION, SOLUTION INTRAMUSCULAR; INTRAVENOUS; SUBCUTANEOUS
Status: DISCONTINUED | OUTPATIENT
Start: 2021-04-14 | End: 2021-04-14 | Stop reason: HOSPADM

## 2021-04-14 RX ORDER — LIDOCAINE 40 MG/G
CREAM TOPICAL
Status: DISCONTINUED | OUTPATIENT
Start: 2021-04-14 | End: 2021-04-14 | Stop reason: HOSPADM

## 2021-04-14 RX ORDER — ONDANSETRON 4 MG/1
4 TABLET, ORALLY DISINTEGRATING ORAL EVERY 6 HOURS PRN
Status: DISCONTINUED | OUTPATIENT
Start: 2021-04-14 | End: 2021-04-14 | Stop reason: HOSPADM

## 2021-04-14 RX ORDER — FLUMAZENIL 0.1 MG/ML
0.2 INJECTION, SOLUTION INTRAVENOUS
Status: DISCONTINUED | OUTPATIENT
Start: 2021-04-14 | End: 2021-04-14 | Stop reason: HOSPADM

## 2021-04-14 RX ORDER — ONDANSETRON 2 MG/ML
4 INJECTION INTRAMUSCULAR; INTRAVENOUS EVERY 6 HOURS PRN
Status: DISCONTINUED | OUTPATIENT
Start: 2021-04-14 | End: 2021-04-14 | Stop reason: HOSPADM

## 2021-04-14 RX ORDER — PROCHLORPERAZINE MALEATE 5 MG
5 TABLET ORAL EVERY 6 HOURS PRN
Status: DISCONTINUED | OUTPATIENT
Start: 2021-04-14 | End: 2021-04-14 | Stop reason: HOSPADM

## 2021-04-14 RX ORDER — ONDANSETRON 2 MG/ML
4 INJECTION INTRAMUSCULAR; INTRAVENOUS
Status: DISCONTINUED | OUTPATIENT
Start: 2021-04-14 | End: 2021-04-14 | Stop reason: HOSPADM

## 2021-04-14 RX ORDER — NALOXONE HYDROCHLORIDE 0.4 MG/ML
0.4 INJECTION, SOLUTION INTRAMUSCULAR; INTRAVENOUS; SUBCUTANEOUS
Status: DISCONTINUED | OUTPATIENT
Start: 2021-04-14 | End: 2021-04-14 | Stop reason: HOSPADM

## 2021-04-14 RX ORDER — FENTANYL CITRATE 50 UG/ML
INJECTION, SOLUTION INTRAMUSCULAR; INTRAVENOUS PRN
Status: COMPLETED | OUTPATIENT
Start: 2021-04-14 | End: 2021-04-14

## 2021-04-14 RX ADMIN — MIDAZOLAM 1 MG: 1 INJECTION INTRAMUSCULAR; INTRAVENOUS at 15:29

## 2021-04-14 RX ADMIN — FENTANYL CITRATE 50 MCG: 50 INJECTION, SOLUTION INTRAMUSCULAR; INTRAVENOUS at 15:29

## 2021-04-14 ASSESSMENT — MIFFLIN-ST. JEOR: SCORE: 1489

## 2021-04-14 NOTE — DISCHARGE INSTRUCTIONS
Discharge Instructions after Colonoscopy  or Sigmoidoscopy    Today you had a ____ Colonoscopy ____ Sigmoidoscopy    Activity and Diet  You were given medicine for pain. You may be dizzy or sleepy.  For 24 hours:    Do not drive or use heavy equipment.    Do not make important decisions.    Do not drink any alcohol.  You may return to your normal diet and medicines.    Discomfort    Air was placed in your colon during the exam in order to see it. Walking helps to pass the air.    You may take Tylenol (acetaminophen) for pain unless your doctor has told you not to.  Do not take aspirin or ibuprofen (Advil, Motrin, or other anti-inflammatory  drugs) for _____ days.    Follow-up  ____ We took small tissue samples or polyps to study. Your doctor will call you with the results  within two weeks.    When to call:    Call right away if you have:    Unusual pain in belly or chest pain not relieved with passing air.    More than 1 to 2 Tablespoons of bleeding from your rectum.    Fever above 100.6  F (37.5  C).    If you have severe pain, bleeding, or shortness of breath, go to an emergency room.    If you have questions, call:  Monday to Friday, 8 a.m. to 4:30 p.m.  Endoscopy: [phone] (We may have to call you back)    After hours  Hospital: [phone] (Ask for the GI fellow on call)

## 2021-04-14 NOTE — PROGRESS NOTES
ANTICOAGULATION  MANAGEMENT: Discharge Review    Haresh Rock chart reviewed for anticoagulation continuity of care    Outpatient surgery/procedure on 04/14 for Colonoscopy.    Discharge disposition: Home    Results:    Recent labs: (last 7 days)     04/09/21 04/14/21  1457   INR 2.0* 1.48*     Anticoagulation inpatient management:     not applicable     Anticoagulation discharge instructions:     Warfarin dosing: per myChart instructions 04/06 (5mg tonight and tomorrow, then return to regular dosing).   Bridging: No   INR goal change: No      Medication changes affecting anticoagulation: No    Additional factors affecting anticoagulation: No    Plan     No adjustment to anticoagulation plan needed    Patient not contacted    No adjustment to Anticoagulation Calendar was required    Hari Saavedra RN

## 2021-04-16 ENCOUNTER — TRANSFERRED RECORDS (OUTPATIENT)
Dept: HEALTH INFORMATION MANAGEMENT | Facility: CLINIC | Age: 71
End: 2021-04-16

## 2021-04-16 ENCOUNTER — ANTICOAGULATION THERAPY VISIT (OUTPATIENT)
Dept: FAMILY MEDICINE | Facility: CLINIC | Age: 71
End: 2021-04-16

## 2021-04-16 DIAGNOSIS — Z79.01 LONG TERM CURRENT USE OF ANTICOAGULANT THERAPY: ICD-10-CM

## 2021-04-16 DIAGNOSIS — Z86.718 PERSONAL HISTORY OF DVT (DEEP VEIN THROMBOSIS): ICD-10-CM

## 2021-04-16 DIAGNOSIS — I48.91 ATRIAL FIBRILLATION (H): ICD-10-CM

## 2021-04-16 LAB — INR PPP: 2 (ref 0.9–1.1)

## 2021-04-16 NOTE — PROGRESS NOTES
ANTICOAGULATION MANAGEMENT     Patient Name:  Haresh Rock  Date:  2021    ASSESSMENT /SUBJECTIVE:    Today's INR result of 2.0 is therapeutic. Goal INR of 2.0-3.0      Warfarin dose taken: Warfarin recently held for colonoscopy on  which may be affecting INR    Diet: No new diet changes affecting INR    Medication changes/ interactions: No new medications/supplements affecting INR    Previous INR: Subtherapeutic 1.48 day of colonoscopy    S/S of bleeding or thromboembolism: No    New injury or illness: No    Upcoming surgery, procedure or cardioversion: No    Additional findings: took 5mg night of colonoscopy and day after as instructed       PLAN:    Telephone call with Haresh regarding INR result and instructed:     Warfarin Dosing Instructions: Continue your current warfarin dose 2.5mg daily     Instructed patient to follow up no later than: 1 week  Patient to recheck with home meter    Education provided: Contact [facility] Anticoagulation: [phone]  with any changes, questions or concerns.       Haresh verbalizes understanding and agrees to warfarin dosing plan.    Instructed to call the Anticoagulation Clinic for any changes, questions or concerns. (#[phone])        Macrina Preston RN      OBJECTIVE:  Recent labs: (last 7 days)     21  1457 21   INR 1.48* 2.0*         No question data found.  Anticoagulation Summary  As of 2021    INR goal:  2.0-3.0   TTR:  63.8 % (1 y)   INR used for dosin.0 (2021)   Warfarin maintenance plan:  2.5 mg (2.5 mg x 1) every day   Full warfarin instructions:  2.5 mg every day   Weekly warfarin total:  17.5 mg   No change documented:  Macrina Preston RN   Plan last modified:  Allyn Doe RN (3/5/2021)   Next INR check:  2021   Priority:  High   Target end date:  Indefinite    Indications    Long-term (current) use of anticoagulants [Z79.01] [Z79.01]  Atrial fibrillation (H) [I48.91]  Antiphospholipid antibody  syndrome (H) (Resolved) [D68.61]  Personal history of DVT (deep vein thrombosis) [Z86.718]  Atrial fibrillation  unspecified type (H) (Resolved) [I48.91]             Anticoagulation Episode Summary     INR check location:      Preferred lab:  EXTERNAL LAB    Send INR reminders to:  CHELSEA CHILEL    Comments:  JESSICA villafuerteay to manage by exception      Anticoagulation Care Providers     Provider Role Specialty Phone number    Anya Nowak MD Referring Family Medicine [phone]         Macrina Avila RN, BSN, PHN

## 2021-04-23 ENCOUNTER — ANTICOAGULATION THERAPY VISIT (OUTPATIENT)
Dept: FAMILY MEDICINE | Facility: CLINIC | Age: 71
End: 2021-04-23

## 2021-04-23 ENCOUNTER — TRANSFERRED RECORDS (OUTPATIENT)
Dept: HEALTH INFORMATION MANAGEMENT | Facility: CLINIC | Age: 71
End: 2021-04-23

## 2021-04-23 DIAGNOSIS — Z86.718 PERSONAL HISTORY OF DVT (DEEP VEIN THROMBOSIS): ICD-10-CM

## 2021-04-23 DIAGNOSIS — Z79.01 LONG TERM CURRENT USE OF ANTICOAGULANT THERAPY: ICD-10-CM

## 2021-04-23 DIAGNOSIS — I48.0 PAROXYSMAL ATRIAL FIBRILLATION (H): ICD-10-CM

## 2021-04-23 LAB — INR PPP: 2.2 (ref 0.9–1.1)

## 2021-04-23 NOTE — PROGRESS NOTES
Name  Haresh Rock 19       MRN  [id number]  Provider          48   Tech KB       Age  71   [facility]       Sex  Male   Visit Type Video       Education  20   Platform Zoom       Handedness Right                        ORIENTATION     CLOCK DRAWING      Time  0    Command  Normal     Personal Information    Copy  Normal     Place            Presidents     ORAL TRAILS              Raw z Errors   WRAT-4 READING     Trails A  6 0.43 0   Raw  68    Trails B  26 0.85 0   SS  122           %ile  93    TSAT       Grade Equivalence >12.9      Raw z          Total Time  65 0.86    WAIS-IV      Total Errors  0 0.92      Raw SS RDS         Digit Span  26 11 8  RBANS       Vocabulary  52 15     Raw z SS/%ile   Matrix Reasoning 14 11   Line Orientation 20 1.29 >75   Similarities  33 16   Story Immediate 21 1 13   EST VIQ   134   Story Delay  11 0.91 13                BOSTON NAMING TEST     HVLT       Raw 60       Raw T    SS 0 %ile 99   Trial 1  5     T 0 MAS 17   Trial 2  6     Total Stim. Correct 0    Trial 3  8     Total Phon. Correct 0    Learning  3           Total Recall  19 42    COWAT      Delayed Recall 6 42    Form CFL     Percent Retention 75 44    Raw 28     True Positives 11     SS 0     False Positives 1     T 0 MAS 8   Discrimination Index 10 48    %ile 19-28 z 0                ILS HEALTH & SAFETY QUESTIONNAIRE    SEMANTIC FLUENCY     Total  39     Raw  37    Classification High     MAS  9                 GDS       COMPLEX IDEATIONAL MATERIAL   Total  5     Raw  12    Interpretation Minimal     SS  12           T  51                        CATE-O COMPLEX FIGURE             Raw %ile          Copy  35 >16          Time to Copy 175 >16

## 2021-04-23 NOTE — PROGRESS NOTES
ANTICOAGULATION  MANAGEMENT-Patient Home Monitoring Result    Assessment     Therapeutic INR result of 2.2 . Goal range 2.0-3.0. Received via fax from [employer] home INR monitoring company.        Previous INR was therapeutic    Haresh was last contacted by phone:     Plan     Per home monitoring agreement with patient, patient was NOT contacted regarding therapeutic result today.  Patient is to continue current dose and continue to check INR with home monitor per protocol.  ?       OBJECTIVE    INR   Date Value Ref Range Status   2021 2.2 (A) 0.90 - 1.10 Final     Factor 2 Assay   Date Value Ref Range Status   2007 58 (L) 60 - 140 % Final     Comment:     (Note)  CALLED TO TAMARA(INTERV. RADIOLOGY)[id number],        ASSESSMENT / PLAN  No question data found.  Anticoagulation Summary  As of 2021    INR goal:  2.0-3.0   TTR:  63.8 % (1 y)   INR used for dosin.2 (2021)   Warfarin maintenance plan:  2.5 mg (2.5 mg x 1) every day   Full warfarin instructions:  2.5 mg every day   Weekly warfarin total:  17.5 mg   No change documented:  Allyn Doe RN   Plan last modified:  Allyn Doe RN (3/5/2021)   Next INR check:  2021   Priority:  High   Target end date:  Indefinite    Indications    Long-term (current) use of anticoagulants [Z79.01] [Z79.01]  Atrial fibrillation (H) [I48.91]  Antiphospholipid antibody syndrome (H) (Resolved) [D68.61]  Personal history of DVT (deep vein thrombosis) [Z86.718]  Atrial fibrillation  unspecified type (H) (Resolved) [I48.91]             Anticoagulation Episode Summary     INR check location:      Preferred lab:  EXTERNAL LAB    Send INR reminders to:  CHELSEA CHILEL    Comments:  JESSICA rodas to manage by exception      Anticoagulation Care Providers     Provider Role Specialty Phone number    Anya Nowak MD Referring Family Medicine [phone]

## 2021-04-30 ENCOUNTER — ANTICOAGULATION THERAPY VISIT (OUTPATIENT)
Dept: FAMILY MEDICINE | Facility: CLINIC | Age: 71
End: 2021-04-30

## 2021-04-30 DIAGNOSIS — Z79.01 LONG TERM CURRENT USE OF ANTICOAGULANT THERAPY: ICD-10-CM

## 2021-04-30 DIAGNOSIS — Z86.718 PERSONAL HISTORY OF DVT (DEEP VEIN THROMBOSIS): ICD-10-CM

## 2021-04-30 DIAGNOSIS — I48.0 PAROXYSMAL ATRIAL FIBRILLATION (H): ICD-10-CM

## 2021-04-30 LAB — INR PPP: 3.5 (ref 0.9–1.1)

## 2021-04-30 NOTE — PROGRESS NOTES
ANTICOAGULATION MANAGEMENT     Patient Name:  Haresh Rock  Date:  4/30/2021    ASSESSMENT /SUBJECTIVE:    Today's INR result of 3.5 is supratherapeutic. Goal INR of 2.0-3.0      Warfarin dose taken: Warfarin taken as instructed    Diet: No new diet changes affecting INR    Medication changes/ interactions: No new medications/supplements affecting INR    Previous INR: Therapeutic     S/S of bleeding or thromboembolism: No    New injury or illness: No    Upcoming surgery, procedure or cardioversion: No    Additional findings: taking Claritin for allergies, has been constipated for a day or two      PLAN:    Telephone call with Haresh regarding INR result and instructed:     Warfarin Dosing Instructions: take 1.25mg tonight  then continue your current warfarin dose of 2.5mg daily     Instructed patient to follow up no later than: 1 week  Patient to recheck with home meter    Education provided: Contact [facility] Anticoagulation: [phone]  with any changes, questions or concerns.       Haresh verbalizes understanding and agrees to warfarin dosing plan.    Instructed to call the Anticoagulation Clinic for any changes, questions or concerns. (#[phone])        Macrina Preston RN      OBJECTIVE:  Recent labs: (last 7 days)     04/30/21   INR 3.5*         No question data found.  Anticoagulation Summary  As of 4/30/2021    INR goal:  2.0-3.0   TTR:  63.1 % (1 y)   INR used for dosing:  3.5 (4/30/2021)   Warfarin maintenance plan:  2.5 mg (2.5 mg x 1) every day   Full warfarin instructions:  2.5 mg every day   Weekly warfarin total:  17.5 mg   Plan last modified:  Allyn Doe RN (3/5/2021)   Next INR check:     Priority:  High   Target end date:  Indefinite    Indications    Long-term (current) use of anticoagulants [Z79.01] [Z79.01]  Atrial fibrillation (H) [I48.91]  Antiphospholipid antibody syndrome (H) (Resolved) [D68.61]  Personal history of DVT (deep vein thrombosis) [Z86.718]  Atrial  fibrillation  unspecified type (H) (Resolved) [I48.91]             Anticoagulation Episode Summary     INR check location:      Preferred lab:  EXTERNAL LAB    Send INR reminders to:  CHELSEA CHILEL    Comments:  JESSICA okay to manage by exception      Anticoagulation Care Providers     Provider Role Specialty Phone number    Anya Nowak MD Referring Family Medicine [phone]         Macrina Avila RN, BSN, PHN

## 2021-05-07 ENCOUNTER — TRANSFERRED RECORDS (OUTPATIENT)
Dept: HEALTH INFORMATION MANAGEMENT | Facility: CLINIC | Age: 71
End: 2021-05-07

## 2021-05-07 ENCOUNTER — ANTICOAGULATION THERAPY VISIT (OUTPATIENT)
Dept: FAMILY MEDICINE | Facility: CLINIC | Age: 71
End: 2021-05-07

## 2021-05-07 DIAGNOSIS — Z79.01 LONG TERM CURRENT USE OF ANTICOAGULANT THERAPY: ICD-10-CM

## 2021-05-07 DIAGNOSIS — I48.0 PAROXYSMAL ATRIAL FIBRILLATION (H): ICD-10-CM

## 2021-05-07 DIAGNOSIS — Z86.718 PERSONAL HISTORY OF DVT (DEEP VEIN THROMBOSIS): ICD-10-CM

## 2021-05-07 LAB — INR PPP: 2.2 (ref 0.9–1.1)

## 2021-05-07 NOTE — PROGRESS NOTES
ANTICOAGULATION MANAGEMENT     Patient Name:  Haresh Rock  Date:  2021    ASSESSMENT /SUBJECTIVE:    Today's INR result of 2.2 is therapeutic. Goal INR of 2.0-3.0      Warfarin dose taken: Warfarin taken as instructed    Diet: No new diet changes affecting INR    Medication changes/ interactions: No new medications/supplements affecting INR    Previous INR: Supratherapeutic     S/S of bleeding or thromboembolism: No    New injury or illness: No    Upcoming surgery, procedure or cardioversion: No    Additional findings: None      PLAN:    Telephone call with Haresh regarding INR result and instructed:     Warfarin Dosing Instructions: Continue your current warfarin dose 2.5mg daily    Instructed patient to follow up no later than: 1 week  Patient to recheck with home meter    Education provided: Contact [facility] Anticoagulation: [phone]  with any changes, questions or concerns.       Haresh verbalizes understanding and agrees to warfarin dosing plan.    Instructed to call the Anticoagulation Clinic for any changes, questions or concerns. (#[phone])        Macrina Preston RN      OBJECTIVE:  Recent labs: (last 7 days)     21   INR 2.2*         No question data found.  Anticoagulation Summary  As of 2021    INR goal:  2.0-3.0   TTR:  62.3 % (1 y)   INR used for dosin.2 (2021)   Warfarin maintenance plan:  2.5 mg (2.5 mg x 1) every day   Full warfarin instructions:  2.5 mg every day   Weekly warfarin total:  17.5 mg   No change documented:  Macrina Preston RN   Plan last modified:  Allyn Doe RN (3/5/2021)   Next INR check:  2021   Priority:  High   Target end date:  Indefinite    Indications    Long-term (current) use of anticoagulants [Z79.01] [Z79.01]  Atrial fibrillation (H) [I48.91]  Antiphospholipid antibody syndrome (H) (Resolved) [D68.61]  Personal history of DVT (deep vein thrombosis) [Z86.718]  Atrial fibrillation  unspecified type (H) (Resolved)  [I48.91]             Anticoagulation Episode Summary     INR check location:      Preferred lab:  EXTERNAL LAB    Send INR reminders to:  CHELSEA CHILEL    Comments:  YEYOR okay to manage by exception      Anticoagulation Care Providers     Provider Role Specialty Phone number    Anya Nowak MD Referring Family Medicine [phone]         Macrina Avila RN, BSN, PHN

## 2021-05-10 ENCOUNTER — HOSPITAL ENCOUNTER (OUTPATIENT)
Dept: SPEECH THERAPY | Facility: CLINIC | Age: 71
Setting detail: THERAPIES SERIES
End: 2021-05-10
Attending: OTOLARYNGOLOGY
Payer: COMMERCIAL

## 2021-05-10 PROCEDURE — 92526 ORAL FUNCTION THERAPY: CPT | Mod: GN,GT | Performed by: SPEECH-LANGUAGE PATHOLOGIST

## 2021-05-10 NOTE — PROGRESS NOTES
Haresh Rock is a 71 year old male who is being seen via a billable video visit.      Patient has given verbal consent for Video visit? Yes    Video Start Time: 12:02 pm    Telehealth Visit Details    Type of Service:  Telehealth    Video End Time (time video stopped): 12:35 pm     Originating Location (pt. location): Home    Additional Participants in Telehealth Visit: None     Distant Location (provider location):  [facility]     Mode of Communication (Audio Visual or Audio Only):  Audio Visual     Linda Anthony, SLP  May 10, 2021

## 2021-05-14 ENCOUNTER — ANTICOAGULATION THERAPY VISIT (OUTPATIENT)
Dept: FAMILY MEDICINE | Facility: CLINIC | Age: 71
End: 2021-05-14

## 2021-05-14 ENCOUNTER — TRANSFERRED RECORDS (OUTPATIENT)
Dept: HEALTH INFORMATION MANAGEMENT | Facility: CLINIC | Age: 71
End: 2021-05-14

## 2021-05-14 DIAGNOSIS — Z79.01 LONG TERM CURRENT USE OF ANTICOAGULANT THERAPY: ICD-10-CM

## 2021-05-14 DIAGNOSIS — I48.0 PAROXYSMAL ATRIAL FIBRILLATION (H): ICD-10-CM

## 2021-05-14 DIAGNOSIS — Z86.718 PERSONAL HISTORY OF DVT (DEEP VEIN THROMBOSIS): ICD-10-CM

## 2021-05-14 LAB — INR PPP: 2.9 (ref 0.9–1.1)

## 2021-05-14 NOTE — PROGRESS NOTES
ANTICOAGULATION  MANAGEMENT-Patient Home Monitoring Result    Assessment     Therapeutic INR result of 2.9 . Goal range 2.0-3.0. Received via fax from [employer] INR monitoring company.        Previous INR was therapeutic    Haresh was last contacted by phone:     Plan     Per home monitoring agreement with patient, patient was NOT contacted regarding therapeutic result today.  Patient is to continue current dose and continue to check INR with home monitor per protocol.  ?       OBJECTIVE    INR   Date Value Ref Range Status   2021 2.9 (A) 0.90 - 1.10 Final     Factor 2 Assay   Date Value Ref Range Status   2007 58 (L) 60 - 140 % Final     Comment:     (Note)  CALLED TO TAMARA(INTERV. RADIOLOGY)[id number],        ASSESSMENT / PLAN  No question data found.  Anticoagulation Summary  As of 2021    INR goal:  2.0-3.0   TTR:  62.3 % (1 y)   INR used for dosin.9 (2021)   Warfarin maintenance plan:  2.5 mg (2.5 mg x 1) every day   Full warfarin instructions:  2.5 mg every day   Weekly warfarin total:  17.5 mg   No change documented:  Macrina Preston, RN   Plan last modified:  Allyn Doe RN (3/5/2021)   Next INR check:  2021   Priority:  High   Target end date:  Indefinite    Indications    Long-term (current) use of anticoagulants [Z79.01] [Z79.01]  Atrial fibrillation (H) [I48.91]  Antiphospholipid antibody syndrome (H) (Resolved) [D68.61]  Personal history of DVT (deep vein thrombosis) [Z86.718]  Atrial fibrillation  unspecified type (H) (Resolved) [I48.91]             Anticoagulation Episode Summary     INR check location:      Preferred lab:  EXTERNAL LAB    Send INR reminders to:  CHELSEA CHILEL    Comments:  JESSICA rodas to manage by exception      Anticoagulation Care Providers     Provider Role Specialty Phone number    Anya Nowak MD Referring Family Medicine [phone]          Macrina Avila, KOSTAS, BSN, PHN

## 2021-05-21 ENCOUNTER — ANTICOAGULATION THERAPY VISIT (OUTPATIENT)
Dept: FAMILY MEDICINE | Facility: CLINIC | Age: 71
End: 2021-05-21

## 2021-05-21 ENCOUNTER — TRANSFERRED RECORDS (OUTPATIENT)
Dept: HEALTH INFORMATION MANAGEMENT | Facility: CLINIC | Age: 71
End: 2021-05-21

## 2021-05-21 DIAGNOSIS — Z79.01 LONG TERM CURRENT USE OF ANTICOAGULANT THERAPY: ICD-10-CM

## 2021-05-21 DIAGNOSIS — I48.0 PAROXYSMAL ATRIAL FIBRILLATION (H): ICD-10-CM

## 2021-05-21 DIAGNOSIS — Z86.718 PERSONAL HISTORY OF DVT (DEEP VEIN THROMBOSIS): ICD-10-CM

## 2021-05-21 LAB — INR PPP: 2.4 (ref 0.9–1.1)

## 2021-05-21 NOTE — PROGRESS NOTES
ANTICOAGULATION  MANAGEMENT-Patient Home Monitoring Result    Assessment     Therapeutic INR result of 2.4 . Goal range 2.0-3.0. Received via fax from [employer] INR monitoring company.        Previous INR was therapeutic    Haresh was last contacted by phone: 2021    Plan     Per home monitoring agreement with patient, patient was NOT contacted regarding therapeutic result today.  Patient is to continue current dose and continue to check INR with home monitor per protocol.  ?       OBJECTIVE    INR   Date Value Ref Range Status   2021 2.4 (A) 0.90 - 1.10 Final     Factor 2 Assay   Date Value Ref Range Status   2007 58 (L) 60 - 140 % Final     Comment:     (Note)  CALLED TO TAMARA(INTERV. RADIOLOGY)[id number],        ASSESSMENT / PLAN  No question data found.  Anticoagulation Summary  As of 2021    INR goal:  2.0-3.0   TTR:  62.3 % (1 y)   INR used for dosin.4 (2021)   Warfarin maintenance plan:  2.5 mg (2.5 mg x 1) every day   Full warfarin instructions:  2.5 mg every day   Weekly warfarin total:  17.5 mg   No change documented:  Macrina Preston, RN   Plan last modified:  Allyn Doe RN (3/5/2021)   Next INR check:  2021   Priority:  High   Target end date:  Indefinite    Indications    Long-term (current) use of anticoagulants [Z79.01] [Z79.01]  Atrial fibrillation (H) [I48.91]  Antiphospholipid antibody syndrome (H) (Resolved) [D68.61]  Personal history of DVT (deep vein thrombosis) [Z86.718]  Atrial fibrillation  unspecified type (H) (Resolved) [I48.91]             Anticoagulation Episode Summary     INR check location:      Preferred lab:  EXTERNAL LAB    Send INR reminders to:  CHELSEA CHILEL    Comments:  [employer] rodas to manage by exception      Anticoagulation Care Providers     Provider Role Specialty Phone number    Anya Nowak MD Referring Family Medicine [phone]          Macrina Avila, KOSTAS, BSN, PHN

## 2021-05-25 ENCOUNTER — OFFICE VISIT (OUTPATIENT)
Dept: FAMILY MEDICINE | Facility: CLINIC | Age: 71
End: 2021-05-25
Payer: COMMERCIAL

## 2021-05-25 ENCOUNTER — MYC MEDICAL ADVICE (OUTPATIENT)
Dept: CARDIOLOGY | Facility: CLINIC | Age: 71
End: 2021-05-25

## 2021-05-25 VITALS — BODY MASS INDEX: 20.59 KG/M2 | TEMPERATURE: 97.5 F | HEIGHT: 72 IN | OXYGEN SATURATION: 98 % | WEIGHT: 152 LBS

## 2021-05-25 DIAGNOSIS — G20.A1 PARKINSON DISEASE (H): ICD-10-CM

## 2021-05-25 DIAGNOSIS — Z79.01 LONG TERM CURRENT USE OF ANTICOAGULANT THERAPY: ICD-10-CM

## 2021-05-25 DIAGNOSIS — E83.110 HEREDITARY HEMOCHROMATOSIS (H): ICD-10-CM

## 2021-05-25 DIAGNOSIS — I48.0 PAROXYSMAL ATRIAL FIBRILLATION (H): ICD-10-CM

## 2021-05-25 DIAGNOSIS — E78.5 HYPERLIPIDEMIA WITH TARGET LDL LESS THAN 100: ICD-10-CM

## 2021-05-25 DIAGNOSIS — R13.10 DYSPHAGIA, UNSPECIFIED TYPE: ICD-10-CM

## 2021-05-25 DIAGNOSIS — Z85.71 HISTORY OF HODGKIN'S LYMPHOMA: ICD-10-CM

## 2021-05-25 DIAGNOSIS — K74.60 CIRRHOSIS OF LIVER NOT DUE TO ALCOHOL (H): ICD-10-CM

## 2021-05-25 DIAGNOSIS — R26.9 GAIT DIFFICULTY: ICD-10-CM

## 2021-05-25 DIAGNOSIS — E11.22 TYPE 2 DIABETES MELLITUS WITH STAGE 2 CHRONIC KIDNEY DISEASE, WITHOUT LONG-TERM CURRENT USE OF INSULIN (H): ICD-10-CM

## 2021-05-25 DIAGNOSIS — I12.9 RENAL HYPERTENSION: ICD-10-CM

## 2021-05-25 DIAGNOSIS — N18.31 STAGE 3A CHRONIC KIDNEY DISEASE (H): ICD-10-CM

## 2021-05-25 DIAGNOSIS — R63.4 WEIGHT LOSS: ICD-10-CM

## 2021-05-25 DIAGNOSIS — N18.2 TYPE 2 DIABETES MELLITUS WITH STAGE 2 CHRONIC KIDNEY DISEASE, WITHOUT LONG-TERM CURRENT USE OF INSULIN (H): ICD-10-CM

## 2021-05-25 DIAGNOSIS — R70.0 ELEVATED SED RATE: Primary | ICD-10-CM

## 2021-05-25 LAB
ALBUMIN SERPL-MCNC: 3.3 G/DL (ref 3.4–5)
ALP SERPL-CCNC: 127 U/L (ref 40–150)
ALT SERPL W P-5'-P-CCNC: 19 U/L (ref 0–70)
ANION GAP SERPL CALCULATED.3IONS-SCNC: 1 MMOL/L (ref 3–14)
AST SERPL W P-5'-P-CCNC: 23 U/L (ref 0–45)
BASOPHILS # BLD AUTO: 0 10E9/L (ref 0–0.2)
BASOPHILS NFR BLD AUTO: 0.2 %
BILIRUB SERPL-MCNC: 0.7 MG/DL (ref 0.2–1.3)
BUN SERPL-MCNC: 26 MG/DL (ref 7–30)
CALCIUM SERPL-MCNC: 8.8 MG/DL (ref 8.5–10.1)
CHLORIDE SERPL-SCNC: 104 MMOL/L (ref 94–109)
CO2 SERPL-SCNC: 32 MMOL/L (ref 20–32)
CREAT SERPL-MCNC: 1.25 MG/DL (ref 0.66–1.25)
DIFFERENTIAL METHOD BLD: ABNORMAL
EOSINOPHIL # BLD AUTO: 0.1 10E9/L (ref 0–0.7)
EOSINOPHIL NFR BLD AUTO: 0.7 %
ERYTHROCYTE [DISTWIDTH] IN BLOOD BY AUTOMATED COUNT: 13.4 % (ref 10–15)
ERYTHROCYTE [SEDIMENTATION RATE] IN BLOOD BY WESTERGREN METHOD: 108 MM/H (ref 0–20)
GFR SERPL CREATININE-BSD FRML MDRD: 57 ML/MIN/{1.73_M2}
GLUCOSE SERPL-MCNC: 109 MG/DL (ref 70–99)
HBA1C MFR BLD: 5.4 % (ref 0–5.6)
HCT VFR BLD AUTO: 43.9 % (ref 40–53)
HGB BLD-MCNC: 15.2 G/DL (ref 13.3–17.7)
LYMPHOCYTES # BLD AUTO: 3.5 10E9/L (ref 0.8–5.3)
LYMPHOCYTES NFR BLD AUTO: 29.4 %
MCH RBC QN AUTO: 31.9 PG (ref 26.5–33)
MCHC RBC AUTO-ENTMCNC: 34.6 G/DL (ref 31.5–36.5)
MCV RBC AUTO: 92 FL (ref 78–100)
MONOCYTES # BLD AUTO: 1 10E9/L (ref 0–1.3)
MONOCYTES NFR BLD AUTO: 8.2 %
NEUTROPHILS # BLD AUTO: 7.4 10E9/L (ref 1.6–8.3)
NEUTROPHILS NFR BLD AUTO: 61.5 %
PLATELET # BLD AUTO: 144 10E9/L (ref 150–450)
POTASSIUM SERPL-SCNC: 4.3 MMOL/L (ref 3.4–5.3)
PROT SERPL-MCNC: 7.6 G/DL (ref 6.8–8.8)
RBC # BLD AUTO: 4.77 10E12/L (ref 4.4–5.9)
SODIUM SERPL-SCNC: 137 MMOL/L (ref 133–144)
TSH SERPL DL<=0.005 MIU/L-ACNC: 1.86 MU/L (ref 0.4–4)
WBC # BLD AUTO: 12 10E9/L (ref 4–11)

## 2021-05-25 PROCEDURE — 85652 RBC SED RATE AUTOMATED: CPT | Performed by: FAMILY MEDICINE

## 2021-05-25 PROCEDURE — 36415 COLL VENOUS BLD VENIPUNCTURE: CPT | Performed by: FAMILY MEDICINE

## 2021-05-25 PROCEDURE — 80053 COMPREHEN METABOLIC PANEL: CPT | Performed by: FAMILY MEDICINE

## 2021-05-25 PROCEDURE — 83036 HEMOGLOBIN GLYCOSYLATED A1C: CPT | Performed by: FAMILY MEDICINE

## 2021-05-25 PROCEDURE — 85025 COMPLETE CBC W/AUTO DIFF WBC: CPT | Performed by: FAMILY MEDICINE

## 2021-05-25 PROCEDURE — 99214 OFFICE O/P EST MOD 30 MIN: CPT | Performed by: FAMILY MEDICINE

## 2021-05-25 PROCEDURE — 84443 ASSAY THYROID STIM HORMONE: CPT | Performed by: FAMILY MEDICINE

## 2021-05-25 ASSESSMENT — MIFFLIN-ST. JEOR: SCORE: 1472.47

## 2021-05-25 NOTE — PROGRESS NOTES
Assessment & Plan     Weight loss  Probably from Dysphagia  Advised add ensure  Consider Dietician eval  Pt declined for now as seeing speech Therapy  - TSH with free T4 reflex  - Erythrocyte sedimentation rate auto  - CBC with platelets differential  - Comprehensive metabolic panel  - CARE COORDINATION REFERRAL    Type 2 diabetes mellitus with stage 2 chronic kidney disease, without long-term current use of insulin (H)  He says was From [place]   - OPTOMETRY REFERRAL; Future  - Hemoglobin A1c    Paroxysmal atrial fibrillation (H)  Discussed to reduce Lopressor to 12.5 mg BID as BP on the Low side and can be making him Dizzy  Watch the rate in A fib  - CARE COORDINATION REFERRAL    Stage 3a chronic kidney disease    - CARE COORDINATION REFERRAL    Hyperlipidemia with target LDL less than 100  Reviewed   - CARE COORDINATION REFERRAL    Long-term (current) use of anticoagulants [Z79.01]  goes to INR nurse  - CARE COORDINATION REFERRAL    Renal hypertension  Stable   - CARE COORDINATION REFERRAL    Parkinson disease (H)  Sees neuro  - HOME CARE NURSING REFERRAL  - OCCUPATIONAL THERAPY REFERRAL; Future  - CARE COORDINATION REFERRAL    Gait difficulty  Pt walks slowly  Risk for falling   Advised PT/OT eval  Not sure if he will qualify for Home PT as he is not Homebound  - [facility] REFERRAL; Future  - OCCUPATIONAL THERAPY REFERRAL; Future  - CARE COORDINATION REFERRAL  They needs support at Home  Will have Care coordination call with community resources    Return in about 1 month (around 6/25/2021) for Medicare wellness Exam with PMD. and review weight    Rosalva Samuels MD  [facility] FRIRYPATTI Hutson is a 71 year old who presents for the following health issues  With [place] History of PAF, parkinson,s , Dysphagia comes in with weight Loss and feeling Tired    HPI     Chief Complaint   Patient presents with     Hypertension     Dizziness with standing up     Weight Loss     Fatigue      Has had Problems with swallowing Food  Pt  Has had Fluroscopy nd now with speech Therapist  feels Dizzy when he Gets Up  Sees Neuro for Parkinson,s  Has More shuffling due to Parkinson  He has PAF -on warfarin and Lopressor  He has been Losing weight since last year  Used to be 175-180 pounds last year  He still has Dysphagia and has had a EGD and now with speech therapy  He chews his food well before swallowing  Has dizziness when he moves      Review of Systems   CONSTITUTIONAL:as above  ENT/MOUTH: NEGATIVE for ear, mouth and throat problems  RESP: NEGATIVE for significant cough or SOB  CV: NEGATIVE for chest pain, palpitations or peripheral edema  GI: NEGATIVE for nausea, abdominal pain, heartburn, or change in bowel habits and as above  NEURO: Parkinson,s  PSYCHIATRIC: NEGATIVE for changes in mood or affect      Objective    /66   Pulse 69   Temp 97.5  F (36.4  C) (Oral)   Ht 1.829 m (6')   Wt 68.9 kg (152 lb)   SpO2 96%   BMI 20.61 kg/m    Body mass index is 20.61 kg/m .   No Orthostatic change in Blood Pressure  Physical Exam   GENERAL: alert and no distress  EYES: Eyes grossly normal to inspection, PERRL and conjunctivae and sclerae normal  NECK: no adenopathy, no asymmetry, masses, or scars and thyroid normal to palpation  RESP: lungs clear to auscultation - no rales, rhonchi or wheezes  CV: regular rate and rhythm, normal S1 S2, no S3 or S4, no murmur, click or rub, no peripheral edema and peripheral pulses strong  ABDOMEN: soft, nontender, no hepatosplenomegaly, no masses and bowel sounds normal  MS: no gross musculoskeletal defects noted, no edema  NEURO: parkinson,s   walks slowly  Cog wheel Rigidity    PSYCH: mentation appears normal    Pending

## 2021-05-26 ENCOUNTER — PATIENT OUTREACH (OUTPATIENT)
Dept: CARE COORDINATION | Facility: CLINIC | Age: 71
End: 2021-05-26

## 2021-05-26 ENCOUNTER — TELEPHONE (OUTPATIENT)
Dept: FAMILY MEDICINE | Facility: CLINIC | Age: 71
End: 2021-05-26

## 2021-05-26 DIAGNOSIS — I47.10 PAROXYSMAL SUPRAVENTRICULAR TACHYCARDIA (H): ICD-10-CM

## 2021-05-26 DIAGNOSIS — I48.0 PAROXYSMAL ATRIAL FIBRILLATION (H): ICD-10-CM

## 2021-05-26 RX ORDER — METOPROLOL TARTRATE 25 MG/1
12.5 TABLET, FILM COATED ORAL 2 TIMES DAILY
Qty: 90 TABLET | Refills: 3 | Status: SHIPPED | OUTPATIENT
Start: 2021-05-26 | End: 2021-06-10

## 2021-05-26 NOTE — TELEPHONE ENCOUNTER
Routing to provider for clarification.    - Aleksandra with [facility] home care intake called. Needs to know what the skilled need is for PT/OT/Speech (got verbal orders 5/25/21).      - Also, the earliest they would be able to see patient would be June 8th due to capacity. Is this ok? If not will need to find different agency.    Return call once receive reply from provider; Aleksandra-intake, [facility]: [phone]    Donna KELSEY RN, BSN  [facility]

## 2021-05-26 NOTE — PROGRESS NOTES
Clinic Care Coordination Contact  [facility]/Voicemail       Clinical Data: CHW Outreach  Outreach attempted x 1. The CHW tried to call the patient twice and both times there was a busy signal.     Plan: CHW will try to reach patient again in 1-2 business days.    Leyla Green  Community Health Worker   [facility]  Care Coordination  [facility]  [email or web address]   Office: [phone]  Fax: [phone]

## 2021-05-26 NOTE — LETTER
[facility] CARE COORDINATION  [address] [place] [address]    May 27, 2021    Haresh Rock  [address] [place] [address]      Dear Haresh,    I am a clinic community health worker who works with Anya Nowak MD at [facility]. I have been trying to reach you recently to introduce Clinic Care Coordination and to see if there was anything I could assist you with.  Below is a description of clinic care coordination and how I can further assist you.      The clinic care coordination team is made up of a registered nurse,  and community health worker who understand the health care system. The goal of clinic care coordination is to help you manage your health and improve access to the health care system in the most efficient manner. The team can assist you in meeting your health care goals by providing education, coordinating services, strengthening the communication among your providers and supporting you with any resource needs.    Please feel free to contact me at [phone] with any questions or concerns. We are focused on providing you with the highest-quality healthcare experience possible and that all starts with you.     Sincerely,     Leyla Green  Community Health Worker   [facility]  Care Coordination  [facility]  [email or web address]  [email or web address]   Office: [phone]  Fax: [phone]

## 2021-05-26 NOTE — TELEPHONE ENCOUNTER
Reason for Call: Request for an order or referral:    Order or referral being requested: order for PT/OT, and speech therapy that was received yesterday.  Need to know what the skilled need actually is?  The earliest they would be able to see patient would be June 8th due to capacity.  If that is not okay, please call Aleksandra back and let her know and then they would need to find another agency.    Date needed: as soon as possible    Best Time:  anytime    Can we leave a detailed message on this number?  Not able to leave message.  Could speak with Nayana Fajardo RN, if have additional questions.    Call taken on 5/26/2021 at 12:22 PM by Lynn Alfonso

## 2021-05-27 ENCOUNTER — PATIENT OUTREACH (OUTPATIENT)
Dept: CARE COORDINATION | Facility: CLINIC | Age: 71
End: 2021-05-27

## 2021-05-27 ENCOUNTER — TELEPHONE (OUTPATIENT)
Dept: FAMILY MEDICINE | Facility: CLINIC | Age: 71
End: 2021-05-27

## 2021-05-27 NOTE — TELEPHONE ENCOUNTER
Mallory Campos, GLADYSW  You     This patient is not in active CareCoordination yet.  We can address on initial call with patient

## 2021-05-27 NOTE — TELEPHONE ENCOUNTER
Patient has active care coordination referral. Please assist patient with finding new agency.     [facility] care notified provider requesting alternate agency.     Karin Alfonso RN

## 2021-05-27 NOTE — PROGRESS NOTES
Clinic Care Coordination Contact  Care Team Conversations    Care team referral for SW to intervene for home care services.  There will be a delay in care based on capacity.  SW called [facility], [place] ([phone]) they cannot staff currently.  SW spoke with [facility] care, they are out 1-2 weeks.   OTF spoke with Karie at [facility] ([phone]) and this Patient's location in not in their service area.  Karie recommended SW call Alice at [facility] ([phone]), they do not have availability for 2-3 weeks.  SW called [facility] (Fernanda [phone]) and they can accept referral, can be out mid next week.  They will call Patient to discuss.  OTF will request care team to send the following information/orders:    Face sheet, H&P, medication and problem list, orders (specify please if ST, PT, OT and RN are needed)      Fax please to [facility]   [phone]    Please call [facility] if there are questions   [phone]    Call to Patient's spouse Angelica to update on above.  She may wish to delay home care, stating he is resisting the idea of home care and his current status.  OTF provided the name and # of home care and SW's contact information    Mallory Campos, NATEW, MSW   [facility]  Care Coordination  [facility]  [phone]  5/27/2021 2:03 PM

## 2021-05-27 NOTE — TELEPHONE ENCOUNTER
Please see new  note.  CHW was unable to reach Patient to coordinate initial assessment.  Please assist with needed items to be faxed including orders.      BRENDAN Stevens, MSW   [facility]  Care Coordination  [facility]  [phone]  5/27/2021 2:05 PM

## 2021-05-27 NOTE — PROGRESS NOTES
Clinic Care Coordination Contact  [facility]/Voicemail       Clinical Data: CHW Outreach  Outreach attempted x 2. Left message on patient's voicemail with call back information and requested return call.    Plan: CHW will send unable to contact letter with care coordinator contact information via [employer]. CHW will do no further outreaches at this time.    Leyla Green  Community Health Worker   [facility]  Care Coordination  [facility]  [email or web address]   Office: [phone]  Fax: [phone]

## 2021-05-27 NOTE — TELEPHONE ENCOUNTER
[facility] Call [facility]    Phone Message    May a detailed message be left on voicemail: yes     Reason for Call: Appointment Intake    Referring Provider Name: Rosalva Samuels MD in  FAMILY PRACTICE  Diagnosis and/or Symptoms: Elevated sed rate     Action Taken: Message routed to:  [facility] and  Adult Rheumatology     Travel Screening: Not Applicable

## 2021-05-28 ENCOUNTER — ANTICOAGULATION THERAPY VISIT (OUTPATIENT)
Dept: FAMILY MEDICINE | Facility: CLINIC | Age: 71
End: 2021-05-28

## 2021-05-28 DIAGNOSIS — I48.0 PAROXYSMAL ATRIAL FIBRILLATION (H): ICD-10-CM

## 2021-05-28 DIAGNOSIS — Z86.718 PERSONAL HISTORY OF DVT (DEEP VEIN THROMBOSIS): ICD-10-CM

## 2021-05-28 DIAGNOSIS — Z79.01 LONG TERM CURRENT USE OF ANTICOAGULANT THERAPY: ICD-10-CM

## 2021-05-28 PROBLEM — I49.9 CARDIAC DYSRHYTHMIA: Status: ACTIVE | Noted: 2021-05-28

## 2021-05-28 PROBLEM — T86.00 COMPLICATIONS OF BONE MARROW TRANSPLANT (H): Status: ACTIVE | Noted: 2021-05-28

## 2021-05-28 PROBLEM — D75.9 DISEASE OF BLOOD AND BLOOD FORMING ORGAN: Status: ACTIVE | Noted: 2021-05-28

## 2021-05-28 PROBLEM — E10.9 DIABETES MELLITUS TYPE 1 (H): Status: ACTIVE | Noted: 2021-05-28

## 2021-05-28 LAB — INR PPP: 2.7 (ref 0.9–1.1)

## 2021-05-28 NOTE — PROGRESS NOTES
ANTICOAGULATION  MANAGEMENT-Patient Home Monitoring Result    Assessment     Therapeutic INR result of 2.7 . Goal range 2.0-3.0. Received via fax from [employer] home INR monitoring company.        Previous INR was therapeutic    Haresh was last contacted by phone:     Plan     Per home monitoring agreement with patient, patient was NOT contacted regarding therapeutic result today.  Patient is to continue current dose and continue to check INR with home monitor per protocol.  ?       OBJECTIVE    INR   Date Value Ref Range Status   2021 2.7 (A) 0.90 - 1.10 Final     Factor 2 Assay   Date Value Ref Range Status   2007 58 (L) 60 - 140 % Final     Comment:     (Note)  CALLED TO [facility](INTERV. RADIOLOGY)[id number],        ASSESSMENT / PLAN  No question data found.  Anticoagulation Summary  As of 2021    INR goal:  2.0-3.0   TTR:  62.3 % (1 y)   INR used for dosin.7 (2021)   Warfarin maintenance plan:  2.5 mg (2.5 mg x 1) every day   Full warfarin instructions:  2.5 mg every day   Weekly warfarin total:  17.5 mg   No change documented:  Blanca Rodriguez RN   Plan last modified:  Allyn Doe RN (3/5/2021)   Next INR check:  2021   Priority:  Maintenance   Target end date:  Indefinite    Indications    Long-term (current) use of anticoagulants [Z79.01] [Z79.01]  Atrial fibrillation (H) [I48.91]  Antiphospholipid antibody syndrome (H) (Resolved) [D68.61]  Personal history of DVT (deep vein thrombosis) [Z86.718]  Atrial fibrillation  unspecified type (H) (Resolved) [I48.91]             Anticoagulation Episode Summary     INR check location:      Preferred lab:  EXTERNAL LAB    Send INR reminders to:  CHELSEA CHILEL    Comments:  [employer] rodas to manage by exception      Anticoagulation Care Providers     Provider Role Specialty Phone number    Anya Nowak MD Referring Family Medicine [phone]

## 2021-06-01 PROBLEM — D75.9 DISEASE OF BLOOD AND BLOOD FORMING ORGAN: Status: RESOLVED | Noted: 2021-05-28 | Resolved: 2021-06-01

## 2021-06-01 PROBLEM — I49.9 CARDIAC DYSRHYTHMIA: Status: RESOLVED | Noted: 2021-05-28 | Resolved: 2021-06-01

## 2021-06-01 PROBLEM — T86.00 COMPLICATIONS OF BONE MARROW TRANSPLANT (H): Status: RESOLVED | Noted: 2021-05-28 | Resolved: 2021-06-01

## 2021-06-02 ENCOUNTER — TELEPHONE (OUTPATIENT)
Dept: FAMILY MEDICINE | Facility: CLINIC | Age: 71
End: 2021-06-02

## 2021-06-02 DIAGNOSIS — R70.0 ELEVATED SED RATE: Primary | ICD-10-CM

## 2021-06-02 NOTE — TELEPHONE ENCOUNTER
Please call patient to schedule follow-up appointment for repeat labs and CXR - okay for same day or add on end of day on Wednesday  Anya Nowak MD

## 2021-06-02 NOTE — PROGRESS NOTES
Diagnosis/Summary/Recommendations:    PATIENT: Haresh Rock  71 year old male     : 1950    KEN: Katharine 10, 2021    [address] [place] [id number] [phone] (M)  [phone] (H)  [email or web address]@"[employer]  Has mychart  proxy - sheyla Ramirez - son  Gemma - daughter  Sheyla Rock  [phone]     [email or web address]@"[employer]   [email or web address]  [email or web address]@"[employer]    Assessment:    (G20) Parkinson disease (H)  (primary encounter diagnosis)  Dopamine scan - (datscan)  11/3/2020  A presynaptic dopaminergic deficit is demonstrated bilaterally.    Visit with son and daughter while he was here today.     Gait/Balance/Falls   One fall     Shuffling    Neuropathy  Left foot has had some slapping     Exercise/Therapy     Cognitive/Driving   Wife wondering about driving  Future issues.     Mood  Daughter with bipolar  Son with mood/ocd/eating  Wife worried about his function and wondering about the future.     Hallucinations/delusions     Sleep  Pseudoeph-doxylamine DM APAP nyquil  He is s leeping a lot more     Bladder  Renal function.   May have stage 2 disease.      Prostate - denies problems. He has has nocturia couple times per night.   Denies elevated psa    GI/Constipation/GERD  Swallow study 2020  1. Multiple events of deep laryngeal penetration with thinner barium consistencies with intermittent aspiration.      Gallstones but has not gallbladder removed.  Had a gallbladder attack x 1 and is not on actigall     Calcium carbonate tums 500mg  Nutritional supplement - 1 bottle per day   Pantoprazole protonix 20mg - not using  Polythyelene glycol miralax - encouraged to use   Prevident 5000 dry mouth - using  Senna-docusate senexon-S senokot-S 2 tabs per day     Weight loss  Working with speech therapist  Does not have an appetite  Trying to eat food but has problems with swallowing        ENDO  Cholesterol  simvastatin 10mg/day  Had diabetes in the past from prednisone  And this may have resolved  May have a thyroid  nodule.  TSH was normal.      Cardio/heart  Flecainide tambocor 50mg  Metoprolol tartrate lopressor 25mg     Vision     Heme  Anticoagulated  warfarin     Hereditary hemochromatosis gene - gene that increases his risk and has not had problems with this. Had had phlebotomies x 2 in the past.      AYUSH Torres     History of pulmonary embolii - back in 2004. This was related to blood clotting problems.   May have had a DVT. Reportedly has had antiphospholipid antibody. He is on coumadin.      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 Had a transplant from Dr. 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  No problems since then.      Other:     Hodgkin's lymphoma in the past 2011 and had chemotherapy for this and followed with CT scan and reportedly this is gone.      Pulmonary hypertension in the past.      msk - bilateral knee replacements.     Eye - left eye has been poor since age 2 due to a lazy eye - amblyopia  Right eye post cataract surgery had an artificial lens put in and does not wear glasses till the evening      Hearing. Feels he has wax in his left ear and partial problem with the right ear. It is variable problem. Has used ear wax removal.      He reports a neuropathy      Medications                 Acetaminophen tylenol 500mg As needed            Amoxicillin amoxil 500mg For dentist           Bisacodyl dulcolax 5mg        Calcium carbonate tums 500mg As needed           Flecainide tambocor 50mg 1     1     Folic acid folvite 1mg 1           Fluticasone flonase 50 mcg/act nasal spray ?using           Loratadine claritin 10mg  As needed           Metoprolol tartrate lopressor 25mg 1/2     1/2     MVI centrum variable           Nutritional supplement - ensure 1 bottle           Pantoprazole protonix 20mg Not taking            Polythyelene glycol  miralax encouraged to use           Prevident 5000 dry mouth using           Pseudoeph-doxylamine DM APAP nyquil  Not taking usually           Senna-docusate 8.6-50 2           Simvastatin zocor 10mg       1     Warfarin anticoagulant coumadin 2.5mg 5 days - 1 tab; 1/2 tab m/f                              Plan:    Probably needs to be seen by general neurology or/and peripheral nerve specialist for subacute left foot drop  He may need left AFO and see PT or/and OT to evaluate and make a AFO    Wondering about traveling to [place] or somewhere with his son - and this should be probably okay.     Discussed chocolate milk, frozen bananas and peanut butter for smoothies to help with his weight loss.     May want someone to his wife - message sent to our nurse to reach out to his wife to provide resources    OT evaluation with brief cognitive evaluation and home functional abilities. - this was ordered.     He has and elevated CRP (12.3) and elevated sed rate (106) and will be seeing someone next week to followup on this.     He should start on vitamin D as he has a low vit d level     Return 3 months.     Visit with Juan Quiros for genetics of parkinson - has two cousins with parkinson           Coding statement:   Medical Decision Making:  #  Chronic progressive medical conditions addressed  Neuropathy, parkinson  Review and/or interpretation of unique test or documentation from a provider outside of neurology no   Independent historian provided additional details  Yes  - wife   Prescription drug management and review of potential side effects and/or monitoring for side effects  yes   Health impacted by social determinants of health  no    I have reviewed the note as documented above.  This accurately captures the substance of my conversation with the patient and total time spent preparing for visit, executing visit and completing visit on the day of the visit:  30 minutes.      Ángel Hill MD     ______________________________________    Last visit date and details:   Answers for HPI/ROS submitted by the patient on 6/7/2021    General Symptoms: Yes  Skin Symptoms: No  HENT Symptoms: Yes  EYE SYMPTOMS: Yes  HEART SYMPTOMS: Yes  LUNG SYMPTOMS: Yes  INTESTINAL SYMPTOMS: No  URINARY SYMPTOMS: Yes  REPRODUCTIVE SYMPTOMS: No  SKELETAL SYMPTOMS: No  BLOOD SYMPTOMS: No  NERVOUS SYSTEM SYMPTOMS: Yes  MENTAL HEALTH SYMPTOMS: No  Fever: No  Loss of appetite: Yes  Weight loss: Yes  Weight gain: No  Fatigue: Yes  Night sweats: No  Chills: No  Increased stress: No  Excessive hunger: No  Excessive thirst: No  Feeling hot or cold when others believe the temperature is normal: No  Loss of height: No  Post-operative complications: No  Surgical site pain: No  Hallucinations: No  Change in or Loss of Energy: No  Hyperactivity: No  Confusion: No  Ear pain: No  Ear discharge: No  Hearing loss: No  Tinnitus: No  Nosebleeds: No  Congestion: No  Sinus pain: No  Trouble swallowing: Yes   Voice hoarseness: Yes  Mouth sores: No  Sore throat: No  Tooth pain: No  Gum tenderness: No  Bleeding gums: No  Change in taste: No  Change in sense of smell: No  Dry mouth: Yes  Hearing aid used: No  Neck lump: No  Eye pain: No  Vision loss: No  Dry eyes: No  Watery eyes: No  Eye bulging: No  Double vision: No  Flashing of lights: No  Spots: No  Floaters: Yes  Redness: No  Crossed eyes: No  Tunnel Vision: No  Yellowing of eyes: No  Eye irritation: No  Cough: No  Sputum or phlegm: No  Coughing up blood: No  Difficulty breating or shortness of breath: Yes  Snoring: No  Wheezing: No  Difficulty breathing on exertion: Yes  Nighttime Cough: No  Difficulty breathing when lying flat: No  Chest pain or pressure: No  Fast or irregular heartbeat: No  Pain in legs with walking: No  Trouble breathing while lying down: No  Fingers or toes appear blue: No  High blood pressure: Yes  Low blood pressure: Yes  Fainting: No  Murmurs: No  Pacemaker: No  Varicose veins: Yes  Edema or swelling: No  Wake up at night with shortness of breath: No  Light-headedness: Yes  Exercise intolerance:  Yes  Trouble holding urine or incontinence: Yes  Pain or burning: No  Trouble starting or stopping: No  Increased frequency of urination: Yes  Blood in urine: No  Decreased frequency of urination: No  Frequent nighttime urination: Yes  Flank pain: No  Difficulty emptying bladder: No  Trouble with coordination: No  Dizziness or trouble with balance: Yes  Fainting or black-out spells: No  Memory loss: No  Headache: No  Seizures: No  Speech problems: No  Tingling: Yes  Tremor: No  Weakness: No  Difficulty walking: No  Paralysis: Yes  Numbness: Yes            ______________________________________      Patient was asked about 14 Review of systems including changes in vision (dry eyes, double vision), hearing, heart, lungs, musculoskeletal, depression, anxiety, snoring, RBD, insomnia, urinary frequency, urinary urgency, constipation, swallowing problems, hematological, ID, allergies, skin problems: seborrhea, endocrinological: thyroid, diabetes, cholesterol; balance, weight changes, and other neurological problems and these were not significant at this time except for   Patient Active Problem List   Diagnosis     Hemochromatosis     Rvzyo-ersfpr-aowm disease, chronic (H)     Advanced directives, counseling/discussion     Polyneuropathy     Status post total knee replacements     Status post bone marrow transplant (H)     Hyperlipidemia with target LDL less than 100     Atrial fibrillation (H)     Chronic rhinitis     Gallstones without obstruction of gallbladder     Long-term (current) use of anticoagulants [Z79.01]     Thyroid nodule     Type 2 diabetes mellitus with stage 2 chronic kidney disease (H)     Renal hypertension     History of Hodgkin's lymphoma     History of irradiation, presenting hazards to health     Primary pulmonary hypertension (H)     Hereditary hemochromatosis (H)     Oropharyngeal dysphagia     Articulation disorder     Personal history of PE (pulmonary embolism)     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)     Parkinson disease (H)     Abnormal dopamine scan (DaTSCAN) 2020     Chronic kidney disease, stage 3     ACP (advance care planning)          Allergies   Allergen Reactions     No Known Drug Allergy      Past Surgical History:   Procedure Laterality Date     ANESTHESIA CARDIOVERSION  4/21/2011    Procedure:ANESTHESIA CARDIOVERSION; Surgeon:GENERIC ANESTHESIA PROVIDER; Location:[facility] OR     ARTHROSCOPY KNEE RT/LT      LT     CATARACT IOL, RT/LT  2017     COLONOSCOPY  10/16/2012    Procedure: COLONOSCOPY;  Colonoscopy, screening;  Surgeon: Rge Feliciano MD;  Location: [facility] OR     COLONOSCOPY N/A 4/14/2021    Procedure: COLONOSCOPY;  Surgeon: Reg Holm MD;  Location: [facility] GI     ESOPHAGOSCOPY, GASTROSCOPY, DUODENOSCOPY (EGD), COMBINED N/A 10/7/2020    Procedure: ESOPHAGOGASTRODUODENOSCOPY, WITH BIOPSY;  Surgeon: Raul Sawyer DO;  Location: [facility] OR     H ABLATION FOCAL AFIB  3/5/2013    PVI     H ABLATION FOCAL AFIB  01/01/2018     HC BONE MARROW/STEM TRANSPLANT ALLOGENIC  5/8/2007     INSERT PORT VASCULAR ACCESS       JOINT REPLACEMTN, KNEE RT/LT  3/28/12    SHAWN     VASECTOMY  1990     Past Medical History:   Diagnosis Date     Abnormal dopamine scan (DaTSCAN) 2020 11/04/2020    [phone] 11/3/2020   Narrative & Impression   Examination: Nuclear medicine DATscan for Dopamine Receptor Localization.   Examination: NM Brain Image Tomographic (SPECT) DATscan   Date: 11/3/2020 3:21 PM   Indication: Parkinsonism   Comparison: None   Additional Information: none   Interfering Medications: None   Technique:   The patient initially received 1 ml of Lugol's solution orally prior     Antiphospholipid antibody syndrome (H) 2005    Ravi's     Antiphospholipid antibody syndrome (H)     Ravi's, noted negative per testing re-done in 2008 in hematology note 01/13/2009     Articulation disorder 09/23/2020     Atrial fibrillation (H) 04/2011     Cirrhosis (H)      CKD (chronic kidney disease) stage 2, GFR  60-89 ml/min      Diabetes mellitus type II     steroid induced     DJD (degenerative joint disease) of knee     SHAWN, worse on right     DVT (deep venous thrombosis) (H)      ED (erectile dysfunction)      Gallbladder polyp 05/2010     Eneqe-Pvkxkz-Mlaz Disease 2007    BMT     Hemochromatosis      Hodgkin's disease NOS     5 cycles of ABVD in 2011     HTN (hypertension)      Hyperlipidaemia LDL goal <100      Multiple thyroid nodules     benign      Myeloproliferative disorder (H)     atypical, [facility]     Parkinson disease (H) 09/24/2020     PE (pulmonary embolism) 11/2004     Polyneuropathy      Primary pulmonary hypertension (H)      Social History     Socioeconomic History     Marital status:      Spouse name: Angelica     Number of children: 2     Years of education: 21     Highest education level: Not on file   Occupational History     Occupation:      Employer: [employer]     Employer: DISABLED   Social Needs     Financial resource strain: Not on file     Food insecurity     Worry: Not on file     Inability: Not on file     Transportation needs     Medical: Not on file     Non-medical: Not on file   Tobacco Use     Smoking status: Never Smoker     Smokeless tobacco: Never Used   Substance and Sexual Activity     Alcohol use: No     Drug use: No     Sexual activity: Not Currently     Partners: Female   Lifestyle     Physical activity     Days per week: Not on file     Minutes per session: Not on file     Stress: Not on file   Relationships     Social connections     Talks on phone: Not on file     Gets together: Not on file     Attends Church service: Not on file     Active member of club or organization: Not on file     Attends meetings of clubs or organizations: Not on file     Relationship status: Not on file     Intimate partner violence     Fear of current or ex partner: Not on file     Emotionally abused: Not on file     Physically abused: Not on file     Forced sexual activity: Not  on file   Other Topics Concern     Parent/sibling w/ CABG, MI or angioplasty before 65F 55M? No   Social History Narrative     about 50 yrs        Wife has some health issues - back pain - second knee replaced    She had gastric bypass and a fib and bad mitral valve        Son in [place]    Daughter in [facility] with 8 yo son.         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[employer]        Born and raised in [place]                Drug and lactation database from the [place] National Library of Medicine:  http://toxnet.nlm.nih.gov/cgi-bin/sis/htmlgen?LACT      B/P: Data Unavailable, T: Data Unavailable, P: Data Unavailable, R: Data Unavailable 0 lbs 0 oz  There were no vitals taken for this visit., There is no height or weight on file to calculate BMI.  Medications and Vitals not listed above were documented in the cart and reviewed by me.     Current Outpatient Medications   Medication Sig Dispense Refill     acetaminophen (TYLENOL) 500 MG tablet 500mg tab as needed       amoxicillin (AMOXIL) 500 MG capsule 4 x 500mg tabs by mouth 1 hour before dental appointments.       bisacodyl (DULCOLAX) 5 MG EC tablet Refer to instructions sent via [employer]. 4 tablet 0     calcium carbonate (TUMS) 500 MG chewable tablet 1 tab by mouth in morning or at night as needed       flecainide (TAMBOCOR) 50 MG tablet Take 1 tablet (50 mg) by mouth 2 times daily 180 tablet 2     fluticasone (FLONASE) 50 MCG/ACT nasal spray Spray 1 spray into both nostrils daily as needed        loratadine (CLARITIN) 10 MG tablet Take 10 mg by mouth daily as needed        metoprolol tartrate (LOPRESSOR) 25 MG tablet Take 0.5 tablets (12.5 mg) by mouth 2 times daily TAKE 1 TABLET BY MOUTH TWICE A DAY 90 tablet 3     multivitamin (CENTRUM SILVER) tablet Take 1 tablet by mouth daily       Nutritional Supplements (ENSURE PO)        polyethylene glycol (GOLYTELY) 236 g suspension Refer to instructions sent via [employer]. 4000 mL 0      polyethylene glycol (MIRALAX) 17 g packet Take 1 packet by mouth daily as needed for constipation       PREVIDENT 5000 DRY MOUTH 1.1 % GEL topical gel Take by mouth daily  3     senna-docusate (SENOKOT-S/PERICOLACE) 8.6-50 MG tablet Take 2 tablets by mouth daily       simvastatin (ZOCOR) 10 MG tablet Take 1 tablet (10 mg) by mouth daily 90 tablet 3     warfarin ANTICOAGULANT (COUMADIN) 2.5 MG tablet TAKE 1.25 MG (1/2 TABLET) MON/FRI AND 2.5 MG (1 TABLET) ALL OTHER DAYS. NEED APPT. 90 tablet 0         Medications                                                                                                                                                  Ángel Hill MD

## 2021-06-04 ENCOUNTER — ANTICOAGULATION THERAPY VISIT (OUTPATIENT)
Dept: FAMILY MEDICINE | Facility: CLINIC | Age: 71
End: 2021-06-04

## 2021-06-04 ENCOUNTER — TRANSFERRED RECORDS (OUTPATIENT)
Dept: HEALTH INFORMATION MANAGEMENT | Facility: CLINIC | Age: 71
End: 2021-06-04

## 2021-06-04 DIAGNOSIS — Z79.01 LONG TERM CURRENT USE OF ANTICOAGULANT THERAPY: ICD-10-CM

## 2021-06-04 DIAGNOSIS — I48.0 PAROXYSMAL ATRIAL FIBRILLATION (H): ICD-10-CM

## 2021-06-04 LAB — INR PPP: 2.3 (ref 0.9–1.1)

## 2021-06-04 NOTE — PROGRESS NOTES
ANTICOAGULATION  MANAGEMENT-Patient Home Monitoring Result    Assessment     Therapeutic INR result of 2.3 . Goal range 2.0-3.0. Received via fax from [employer] INR monitoring company.        Previous INR was therapeutic    Haresh was last contacted by phone:     Plan     Per home monitoring agreement with patient, patient was NOT contacted regarding therapeutic result today.  Patient is to continue current dose and continue to check INR with home monitor per protocol.  ?       OBJECTIVE    INR   Date Value Ref Range Status   2021 2.3 (A) 0.90 - 1.10 Final     Factor 2 Assay   Date Value Ref Range Status   2007 58 (L) 60 - 140 % Final     Comment:     (Note)  CALLED TO [facility](INTERV. RADIOLOGY)[id number],        ASSESSMENT / PLAN  No question data found.  Anticoagulation Summary  As of 2021    INR goal:  2.0-3.0   TTR:  62.3 % (1 y)   INR used for dosin.3 (2021)   Warfarin maintenance plan:  2.5 mg (2.5 mg x 1) every day   Full warfarin instructions:  2.5 mg every day   Weekly warfarin total:  17.5 mg   No change documented:  Blanca Rodriguez RN   Plan last modified:  Allyn Doe RN (3/5/2021)   Next INR check:  2021   Priority:  Maintenance   Target end date:  Indefinite    Indications    Long-term (current) use of anticoagulants [Z79.01] [Z79.01]  Atrial fibrillation (H) [I48.91]  Antiphospholipid antibody syndrome (H) (Resolved) [D68.61]  Personal history of DVT (deep vein thrombosis) (Resolved) [Z86.718]  Atrial fibrillation  unspecified type (H) (Resolved) [I48.91]             Anticoagulation Episode Summary     INR check location:      Preferred lab:  EXTERNAL LAB    Send INR reminders to:  CHELSEA CHILEL    Comments:  PEPPER rodas to manage by exception      Anticoagulation Care Providers     Provider Role Specialty Phone number    Anya Nowak MD Referring Family Medicine [phone]

## 2021-06-04 NOTE — TELEPHONE ENCOUNTER
RECORDS RECEIVED FROM: Internal   Appt Date: 06.15.2021   NOTES STATUS DETAILS   OFFICE NOTE from referring provider Internal 05.28.2021 Consult    Rosalva Samuels MD        OFFICE NOTES from other specialists N/A    DISCHARGE SUMMARY from hospital N/A    MEDICATION LIST Internal    LIVER BIOSPY (IF APPLICABLE)      PATHOLOGY REPORTS  N/A    IMAGING     ENDOSCOPY (IF AVAILABLE) Internal 10.07.2021   COLONOSCOPY (IF AVAILABLE) Internal 04.21.2021   ULTRASOUND LIVER N/A    CT OF ABDOMEN N/A    MRI OF LIVER N/A    FIBROSCAN, US ELASTOGRAPHY, FIBROSIS SCAN, MR ELASTOGRAPHY N/A    LABS     HEPATIC PANEL (LIVER PANEL) N/A    BASIC METABOLIC PANEL N/A    COMPLETE METABOLIC PANEL Internal 05.25.2021   COMPLETE BLOOD COUNT (CBC) Internal 05.25.2021   INTERNATIONAL NORMALIZED RATIO (INR) Internal 05.28.2021   HEPATITIS C ANTIBODY N/A    HEPATITIS C VIRAL LOAD/PCR N/A    HEPATITIS C GENOTYPE N/A    HEPATITIS B SURFACE ANTIGEN N/A    HEPATITIS B SURFACE ANTIBODY N/A    HEPATITIS B DNA QUANT LEVEL N/A    HEPATITIS B CORE ANTIBODY N/A

## 2021-06-07 ASSESSMENT — ENCOUNTER SYMPTOMS
FEVER: 0
NIGHT SWEATS: 0
PALPITATIONS: 0
NECK MASS: 0
FATIGUE: 1
WEIGHT GAIN: 0
HEADACHES: 0
SORE THROAT: 0
SHORTNESS OF BREATH: 1
DOUBLE VISION: 0
EYE IRRITATION: 0
SPEECH CHANGE: 0
SPUTUM PRODUCTION: 0
EYE WATERING: 0
EXERCISE INTOLERANCE: 1
PARALYSIS: 1
DISTURBANCES IN COORDINATION: 0
LIGHT-HEADEDNESS: 1
HEMOPTYSIS: 0
COUGH: 0
SINUS PAIN: 0
FLANK PAIN: 0
HOARSE VOICE: 1
EYE PAIN: 0
LOSS OF CONSCIOUSNESS: 0
DIZZINESS: 1
CHILLS: 0
SINUS CONGESTION: 0
SNORES LOUDLY: 0
TINGLING: 1
POLYDIPSIA: 0
WEIGHT LOSS: 1
HALLUCINATIONS: 0
DYSPNEA ON EXERTION: 1
POSTURAL DYSPNEA: 0
NUMBNESS: 1
ORTHOPNEA: 0
WEAKNESS: 0
DYSURIA: 0
INCREASED ENERGY: 0
SYNCOPE: 0
MEMORY LOSS: 0
TREMORS: 0
TROUBLE SWALLOWING: 1
TASTE DISTURBANCE: 0
HYPERTENSION: 1
SLEEP DISTURBANCES DUE TO BREATHING: 0
COUGH DISTURBING SLEEP: 0
SMELL DISTURBANCE: 0
DIFFICULTY URINATING: 0
WHEEZING: 0
ALTERED TEMPERATURE REGULATION: 0
HYPOTENSION: 1
SEIZURES: 0
LEG PAIN: 0
DECREASED APPETITE: 1
EYE REDNESS: 0
POLYPHAGIA: 0
HEMATURIA: 0

## 2021-06-08 ENCOUNTER — ANCILLARY PROCEDURE (OUTPATIENT)
Dept: GENERAL RADIOLOGY | Facility: CLINIC | Age: 71
End: 2021-06-08
Attending: FAMILY MEDICINE
Payer: COMMERCIAL

## 2021-06-08 ENCOUNTER — OFFICE VISIT (OUTPATIENT)
Dept: FAMILY MEDICINE | Facility: CLINIC | Age: 71
End: 2021-06-08
Payer: COMMERCIAL

## 2021-06-08 ENCOUNTER — PATIENT OUTREACH (OUTPATIENT)
Dept: CARE COORDINATION | Facility: CLINIC | Age: 71
End: 2021-06-08

## 2021-06-08 VITALS
TEMPERATURE: 97.4 F | BODY MASS INDEX: 20.45 KG/M2 | HEIGHT: 72 IN | SYSTOLIC BLOOD PRESSURE: 119 MMHG | DIASTOLIC BLOOD PRESSURE: 72 MMHG | HEART RATE: 70 BPM | OXYGEN SATURATION: 98 % | WEIGHT: 151 LBS

## 2021-06-08 DIAGNOSIS — R63.4 WEIGHT LOSS: Primary | ICD-10-CM

## 2021-06-08 DIAGNOSIS — R70.0 ELEVATED SED RATE: ICD-10-CM

## 2021-06-08 DIAGNOSIS — R13.12 OROPHARYNGEAL DYSPHAGIA: ICD-10-CM

## 2021-06-08 DIAGNOSIS — I27.0 PRIMARY PULMONARY HYPERTENSION (H): ICD-10-CM

## 2021-06-08 DIAGNOSIS — G20.A1 PARKINSON DISEASE (H): ICD-10-CM

## 2021-06-08 DIAGNOSIS — J31.0 CHRONIC RHINITIS: ICD-10-CM

## 2021-06-08 DIAGNOSIS — R63.4 WEIGHT LOSS: ICD-10-CM

## 2021-06-08 DIAGNOSIS — E55.9 VITAMIN D DEFICIENCY: ICD-10-CM

## 2021-06-08 DIAGNOSIS — R76.8 ELEVATED RHEUMATOID FACTOR: ICD-10-CM

## 2021-06-08 PROBLEM — D69.6 THROMBOCYTOPENIA (H): Status: ACTIVE | Noted: 2021-06-08

## 2021-06-08 LAB
BASOPHILS # BLD AUTO: 0 10E9/L (ref 0–0.2)
BASOPHILS NFR BLD AUTO: 0.3 %
DIFFERENTIAL METHOD BLD: ABNORMAL
EOSINOPHIL # BLD AUTO: 0.4 10E9/L (ref 0–0.7)
EOSINOPHIL NFR BLD AUTO: 5 %
ERYTHROCYTE [DISTWIDTH] IN BLOOD BY AUTOMATED COUNT: 13.3 % (ref 10–15)
ERYTHROCYTE [SEDIMENTATION RATE] IN BLOOD BY WESTERGREN METHOD: 106 MM/H (ref 0–20)
HCT VFR BLD AUTO: 42.6 % (ref 40–53)
HGB BLD-MCNC: 15 G/DL (ref 13.3–17.7)
LYMPHOCYTES # BLD AUTO: 2.4 10E9/L (ref 0.8–5.3)
LYMPHOCYTES NFR BLD AUTO: 31.9 %
MCH RBC QN AUTO: 32.5 PG (ref 26.5–33)
MCHC RBC AUTO-ENTMCNC: 35.2 G/DL (ref 31.5–36.5)
MCV RBC AUTO: 92 FL (ref 78–100)
MONOCYTES # BLD AUTO: 0.8 10E9/L (ref 0–1.3)
MONOCYTES NFR BLD AUTO: 10.2 %
NEUTROPHILS # BLD AUTO: 3.9 10E9/L (ref 1.6–8.3)
NEUTROPHILS NFR BLD AUTO: 52.6 %
PLATELET # BLD AUTO: 133 10E9/L (ref 150–450)
RBC # BLD AUTO: 4.62 10E12/L (ref 4.4–5.9)
WBC # BLD AUTO: 7.5 10E9/L (ref 4–11)

## 2021-06-08 PROCEDURE — 71046 X-RAY EXAM CHEST 2 VIEWS: CPT | Performed by: RADIOLOGY

## 2021-06-08 PROCEDURE — 85025 COMPLETE CBC W/AUTO DIFF WBC: CPT | Performed by: FAMILY MEDICINE

## 2021-06-08 PROCEDURE — 86200 CCP ANTIBODY: CPT | Performed by: FAMILY MEDICINE

## 2021-06-08 PROCEDURE — 99207 E-CONSULT TO RHEUMATOLOGY (ADULT OUTPT PROVIDER TO SPECIALIST WRITTEN QUESTION & RESPONSE): CPT | Performed by: FAMILY MEDICINE

## 2021-06-08 PROCEDURE — 82306 VITAMIN D 25 HYDROXY: CPT | Performed by: FAMILY MEDICINE

## 2021-06-08 PROCEDURE — 86140 C-REACTIVE PROTEIN: CPT | Performed by: FAMILY MEDICINE

## 2021-06-08 PROCEDURE — 85652 RBC SED RATE AUTOMATED: CPT | Performed by: FAMILY MEDICINE

## 2021-06-08 PROCEDURE — 86431 RHEUMATOID FACTOR QUANT: CPT | Performed by: FAMILY MEDICINE

## 2021-06-08 PROCEDURE — 36415 COLL VENOUS BLD VENIPUNCTURE: CPT | Performed by: FAMILY MEDICINE

## 2021-06-08 PROCEDURE — 99214 OFFICE O/P EST MOD 30 MIN: CPT | Performed by: FAMILY MEDICINE

## 2021-06-08 RX ORDER — FLUTICASONE PROPIONATE 50 MCG
1 SPRAY, SUSPENSION (ML) NASAL DAILY PRN
Qty: 16 G | Refills: 11 | Status: SHIPPED | OUTPATIENT
Start: 2021-06-08 | End: 2022-10-11

## 2021-06-08 RX ORDER — CHOLECALCIFEROL (VITAMIN D3) 50 MCG
1 TABLET ORAL DAILY
COMMUNITY
End: 2023-06-06

## 2021-06-08 ASSESSMENT — MIFFLIN-ST. JEOR: SCORE: 1467.93

## 2021-06-08 NOTE — PROGRESS NOTES
Clinic Care Coordination Contact  [facility]/Voicemail       Clinical Data: Care Coordinator Outreach  Outreach attempted x 1.  Left message on patient's voicemail with call back information and requested return call.  Plan: Care Coordinator will try to reach patient again in 3-5 business days.  SW will call after scheduled neurology visit this week     BRENDAN Stevens, MSW   [facility]  Care Coordination  [facility]  [phone]  6/8/2021 12:59 PM

## 2021-06-08 NOTE — PROGRESS NOTES
Assessment & Plan     Weight loss  Elevated sed rate  Elevated rheumatoid factor  -rheumatology consult planned   - CBC with platelets and differential  - ESR: Erythrocyte sedimentation rate  - Rheumatoid factor  - Cyclic Citrullinated Peptide Antibody IgG  - CRP inflammation    Parkinson disease (H)  Oropharyngeal dysphagia  -scheduled to see neurology   -offered physical therapy and falls precautions   -discussed Boost for calorie supplement   -follow-up 1 month for weight check     Primary pulmonary hypertension (H)  -follow     Chronic rhinitis  - fluticasone (FLONASE) 50 MCG/ACT nasal spray; Spray 1 spray into both nostrils daily as needed for rhinitis    Vitamin D deficiency  - Vitamin D Deficiency  -recommended over-the-counter supplement     Ordering of each unique test  Prescription drug management         See Patient Instructions    Return in about 1 month (around 7/8/2021) for Wellness visit.    Anya Nowak MD  [facility] DOUG Hutson is a 71 year old who presents for the following health issues     HPI     Chief Complaint   Patient presents with     RECHECK     lab / xray     Haresh Rock is a 71 year old male who presents with ongoing weight loss and dysphagia, thought to be related to Parkinson's disease. Records are reviewed. Symptom onset has been gradual, worsening for a time period of months. Severity is described as moderate. Course of his symptoms over time is worsening.         Review of Systems   CONSTITUTIONAL:POSITIVE  for weight loss  INTEGUMENTARY/SKIN: NEGATIVE for worrisome rashes, moles or lesions  EYES: NEGATIVE for vision changes or irritation  ENT/MOUTH: chronic rhinitis with springtime aggravation   RESP: NEGATIVE for significant cough or SOB  CV: NEGATIVE for chest pain, palpitations or peripheral edema  MUSCULOSKELETAL: gait disturbance with foot weakness and rigidity   NEURO: Parkinson's   ENDOCRINE: Hx diabetes  PSYCHIATRIC: NEGATIVE for  changes in mood or affect      Objective    /72   Pulse 70   Temp 97.4  F (36.3  C) (Oral)   Ht 1.829 m (6')   Wt 68.5 kg (151 lb)   SpO2 98%   BMI 20.48 kg/m    Body mass index is 20.48 kg/m .  Physical Exam   GENERAL: healthy, alert and no distress  EYES: Eyes grossly normal to inspection, PERRL and conjunctivae and sclerae normal  HENT: ear canals and TM's normal, nose and mouth without ulcers or lesions  NECK: no adenopathy, no asymmetry, masses, or scars and thyroid normal to palpation  RESP: lungs clear to auscultation - no rales, rhonchi or wheezes  CV: regular rate and rhythm, normal S1 S2, no S3 or S4, no murmur, click or rub, no peripheral edema    MS: no gross musculoskeletal defects noted, no edema  SKIN: no suspicious lesions or rashes  NEURO: flat faces, rigid gait, sensory exam grossly normal and mentation intact  PSYCH: mentation appears normal, affect normal/bright    Anticoagulation Therapy Visit on 06/04/2021   Component Date Value Ref Range Status     INR 06/04/2021 2.3* 0.90 - 1.10 Final     No results found. However, due to the size of the patient record, not all encounters were searched. Please check Results Review for a complete set of results.

## 2021-06-09 LAB
CRP SERPL-MCNC: 12.3 MG/L (ref 0–8)
DEPRECATED CALCIDIOL+CALCIFEROL SERPL-MC: 19 UG/L (ref 20–75)
RHEUMATOID FACT SER NEPH-ACNC: <7 IU/ML (ref 0–20)

## 2021-06-10 ENCOUNTER — OFFICE VISIT (OUTPATIENT)
Dept: NEUROLOGY | Facility: CLINIC | Age: 71
End: 2021-06-10
Payer: COMMERCIAL

## 2021-06-10 VITALS
BODY MASS INDEX: 20.45 KG/M2 | HEART RATE: 65 BPM | WEIGHT: 151 LBS | HEIGHT: 72 IN | SYSTOLIC BLOOD PRESSURE: 117 MMHG | RESPIRATION RATE: 16 BRPM | DIASTOLIC BLOOD PRESSURE: 73 MMHG | OXYGEN SATURATION: 96 %

## 2021-06-10 DIAGNOSIS — G62.9 NEUROPATHY: ICD-10-CM

## 2021-06-10 DIAGNOSIS — I48.0 PAROXYSMAL ATRIAL FIBRILLATION (H): ICD-10-CM

## 2021-06-10 DIAGNOSIS — G20.A1 PARKINSON DISEASE (H): Primary | ICD-10-CM

## 2021-06-10 DIAGNOSIS — I47.10 PAROXYSMAL SUPRAVENTRICULAR TACHYCARDIA (H): ICD-10-CM

## 2021-06-10 DIAGNOSIS — Z82.0 FAMILY HISTORY OF PARKINSON DISEASE: ICD-10-CM

## 2021-06-10 DIAGNOSIS — M21.372 FOOT DROP, LEFT: ICD-10-CM

## 2021-06-10 LAB — CCP AB SER IA-ACNC: 1 U/ML

## 2021-06-10 PROCEDURE — 99214 OFFICE O/P EST MOD 30 MIN: CPT | Performed by: PSYCHIATRY & NEUROLOGY

## 2021-06-10 RX ORDER — METOPROLOL TARTRATE 25 MG/1
12.5 TABLET, FILM COATED ORAL 2 TIMES DAILY
Qty: 90 TABLET | Refills: 3 | COMMUNITY
Start: 2021-06-10 | End: 2021-07-19

## 2021-06-10 ASSESSMENT — MIFFLIN-ST. JEOR: SCORE: 1467.93

## 2021-06-10 ASSESSMENT — PAIN SCALES - GENERAL: PAINLEVEL: NO PAIN (0)

## 2021-06-10 NOTE — PATIENT INSTRUCTIONS
(G20) Parkinson disease (H)  (primary encounter diagnosis)  Dopamine scan - (datscan)  11/3/2020  A presynaptic dopaminergic deficit is demonstrated bilaterally.    Visit with son and daughter while he was here today.     Gait/Balance/Falls   One fall     Shuffling    Neuropathy  Left foot has had some slapping     Exercise/Therapy     Cognitive/Driving   Wife wondering about driving  Future issues.     Mood  Daughter with bipolar  Son with mood/ocd/eating  Wife worried about his function and wondering about the future.     Hallucinations/delusions     Sleep  Pseudoeph-doxylamine DM APAP nyquil  He is s leeping a lot more     Bladder  Renal function.   May have stage 2 disease.      Prostate - denies problems. He has has nocturia couple times per night.   Denies elevated psa    GI/Constipation/GERD  Swallow study 9/22/2020  1. Multiple events of deep laryngeal penetration with thinner barium consistencies with intermittent aspiration.      Gallstones but has not gallbladder removed.  Had a gallbladder attack x 1 and is not on actigall     Calcium carbonate tums 500mg  Nutritional supplement - 1 bottle per day   Pantoprazole protonix 20mg - not using  Polythyelene glycol miralax - encouraged to use   Prevident 5000 dry mouth - using  Senna-docusate senexon-S senokot-S 2 tabs per day     Weight loss  Working with speech therapist  Does not have an appetite  Trying to eat food but has problems with swallowing        ENDO  Cholesterol  simvastatin 10mg/day  Had diabetes in the past from prednisone  And this may have resolved  May have a thyroid nodule.  TSH was normal.      Cardio/heart  Flecainide tambocor 50mg  Metoprolol tartrate lopressor 25mg     Vision     Heme  Anticoagulated  warfarin     Hereditary hemochromatosis gene - gene that increases his risk and has not had problems with this. Had had phlebotomies x 2 in the past.      AYUSH Torres     History of pulmonary embolii - back in 2004. This was related to  blood clotting problems.   May have had a DVT. Reportedly has had antiphospholipid antibody. He is on coumadin.      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 Had a transplant from Dr. 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  No problems since then.      Other:     Hodgkin's lymphoma in the past 2011 and had chemotherapy for this and followed with CT scan and reportedly this is gone.      Pulmonary hypertension in the past.      msk - bilateral knee replacements.     Eye - left eye has been poor since age 2 due to a lazy eye - amblyopia  Right eye post cataract surgery had an artificial lens put in and does not wear glasses till the evening      Hearing. Feels he has wax in his left ear and partial problem with the right ear. It is variable problem. Has used ear wax removal.      He reports a neuropathy      Medications                 Acetaminophen tylenol 500mg As needed            Amoxicillin amoxil 500mg For dentist           Bisacodyl dulcolax 5mg        Calcium carbonate tums 500mg As needed           Flecainide tambocor 50mg 1     1     Folic acid folvite 1mg 1           Fluticasone flonase 50 mcg/act nasal spray ?using           Loratadine claritin 10mg  As needed           Metoprolol tartrate lopressor 25mg 1/2     1/2     MVI centrum variable           Nutritional supplement - ensure 1 bottle           Pantoprazole protonix 20mg Not taking            Polythyelene glycol  miralax encouraged to use           Prevident 5000 dry mouth using           Pseudoeph-doxylamine DM APAP nyquil Not taking usually           Senna-docusate 8.6-50 2           Simvastatin zocor 10mg       1     Warfarin anticoagulant coumadin 2.5mg 5 days - 1 tab; 1/2 tab m/f                              Plan:    Probably needs to be seen by general neurology or/and peripheral nerve specialist for subacute left foot drop  He may need left AFO and see PT or/and OT to evaluate and make a  AFO    Wondering about traveling to [place] or somewhere with his son - and this should be probably okay.     Discussed chocolate milk, frozen bananas and peanut butter for smoothies to help with his weight loss.     May want someone to his wife - message sent to our nurse to reach out to his wife to provide resources    OT evaluation with brief cognitive evaluation and home functional abilities. - this was ordered.     He has and elevated CRP (12.3) and elevated sed rate (106) and will be seeing someone next week to followup on this.     He should start on vitamin D as he has a low vit d level     Return 3 months.

## 2021-06-10 NOTE — RESULT ENCOUNTER NOTE
Haresh,    As we discussed by phone today, I have requested a rheumatology econsult about your abnormal blood tests. You should hear back from my care team after this within a week.     Anya Nowak MD

## 2021-06-10 NOTE — LETTER
6/10/2021       RE: Haresh Rock  [address] [place] [address]     Dear Colleague,    Thank you for referring your patient, Haresh Rock, to the [facility] [place] at [facility]. Please see a copy of my visit note below.        Diagnosis/Summary/Recommendations:    PATIENT: Haresh Rock  71 year old male     : 1950    KEN: Katharine 10, 2021    [address] [place] [id number] [phone] (M)  [phone] (H)  [email or web address]  Has [email or web address]  proxy - sheyla Ramirez - son  Gemma - daughter  Sheyla Rock  [phone]     [email or web address]  [email or web address]    Assessment:    (G20) Parkinson disease (H)  (primary encounter diagnosis)  Dopamine scan - (datscan)  11/3/2020  A presynaptic dopaminergic deficit is demonstrated bilaterally.    Visit with son and daughter while he was here today.     Gait/Balance/Falls   One fall     Shuffling    Neuropathy  Left foot has had some slapping     Exercise/Therapy     Cognitive/Driving   Wife wondering about driving  Future issues.     Mood  Daughter with bipolar  Son with mood/ocd/eating  Wife worried about his function and wondering about the future.     Hallucinations/delusions     Sleep  Pseudoeph-doxylamine DM APAP nyquil  He is s leeping a lot more     Bladder  Renal function.   May have stage 2 disease.      Prostate - denies problems. He has has nocturia couple times per night.   Denies elevated psa    GI/Constipation/GERD  Swallow study 2020  1. Multiple events of deep laryngeal penetration with thinner barium consistencies with intermittent aspiration.      Gallstones but has not gallbladder removed.  Had a gallbladder attack x 1 and is not on actigall     Calcium carbonate tums 500mg  Nutritional supplement - 1 bottle per day   Pantoprazole protonix 20mg - not using  Polythyelene glycol miralax - encouraged to use   Prevident 5000 dry mouth -  using  Senna-docusate senexon-S senokot-S 2 tabs per day     Weight loss  Working with speech therapist  Does not have an appetite  Trying to eat food but has problems with swallowing        ENDO  Cholesterol  simvastatin 10mg/day  Had diabetes in the past from prednisone  And this may have resolved  May have a thyroid nodule.  TSH was normal.      Cardio/heart  Flecainide tambocor 50mg  Metoprolol tartrate lopressor 25mg     Vision     Heme  Anticoagulated  warfarin     Hereditary hemochromatosis gene - gene that increases his risk and has not had problems with this. Had had phlebotomies x 2 in the past.      AYUSH Torres     History of pulmonary embolii - back in 2004. This was related to blood clotting problems.   May have had a DVT. Reportedly has had antiphospholipid antibody. He is on coumadin.      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 Had a transplant from Dr. 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  No problems since then.      Other:     Hodgkin's lymphoma in the past 2011 and had chemotherapy for this and followed with CT scan and reportedly this is gone.      Pulmonary hypertension in the past.      msk - bilateral knee replacements.     Eye - left eye has been poor since age 2 due to a lazy eye - amblyopia  Right eye post cataract surgery had an artificial lens put in and does not wear glasses till the evening      Hearing. Feels he has wax in his left ear and partial problem with the right ear. It is variable problem. Has used ear wax removal.      He reports a neuropathy      Medications                 Acetaminophen tylenol 500mg As needed            Amoxicillin amoxil 500mg For dentist           Bisacodyl dulcolax 5mg        Calcium carbonate tums 500mg As needed           Flecainide tambocor 50mg 1     1     Folic acid folvite 1mg 1           Fluticasone flonase 50 mcg/act nasal spray ?using           Loratadine claritin 10mg  As needed            Metoprolol tartrate lopressor 25mg 1/2     1/2     MVI centrum variable           Nutritional supplement - ensure 1 bottle           Pantoprazole protonix 20mg Not taking            Polythyelene glycol  miralax encouraged to use           Prevident 5000 dry mouth using           Pseudoeph-doxylamine DM APAP nyquil Not taking usually           Senna-docusate 8.6-50 2           Simvastatin zocor 10mg       1     Warfarin anticoagulant coumadin 2.5mg 5 days - 1 tab; 1/2 tab m/f                              Plan:    Probably needs to be seen by general neurology or/and peripheral nerve specialist for subacute left foot drop  He may need left AFO and see PT or/and OT to evaluate and make a AFO    Wondering about traveling to [place] or somewhere with his son - and this should be probably okay.     Discussed chocolate milk, frozen bananas and peanut butter for smoothies to help with his weight loss.     May want someone to his wife - message sent to our nurse to reach out to his wife to provide resources    OT evaluation with brief cognitive evaluation and home functional abilities. - this was ordered.     He has and elevated CRP (12.3) and elevated sed rate (106) and will be seeing someone next week to followup on this.     He should start on vitamin D as he has a low vit d level     Return 3 months.     Visit with Juan Quiros for genetics of parkinson - has two cousins with parkinson           Coding statement:   Medical Decision Making:  #  Chronic progressive medical conditions addressed  Neuropathy, parkinson  Review and/or interpretation of unique test or documentation from a provider outside of neurology no   Independent historian provided additional details  Yes  - wife   Prescription drug management and review of potential side effects and/or monitoring for side effects  yes   Health impacted by social determinants of health  no    I have reviewed the note as documented above.  This accurately captures the  substance of my conversation with the patient and total time spent preparing for visit, executing visit and completing visit on the day of the visit:  30 minutes.      Ángel Hill MD     ______________________________________    Last visit date and details:   Answers for HPI/ROS submitted by the patient on 6/7/2021   General Symptoms: Yes  Skin Symptoms: No  HENT Symptoms: Yes  EYE SYMPTOMS: Yes  HEART SYMPTOMS: Yes  LUNG SYMPTOMS: Yes  INTESTINAL SYMPTOMS: No  URINARY SYMPTOMS: Yes  REPRODUCTIVE SYMPTOMS: No  SKELETAL SYMPTOMS: No  BLOOD SYMPTOMS: No  NERVOUS SYSTEM SYMPTOMS: Yes  MENTAL HEALTH SYMPTOMS: No  Fever: No  Loss of appetite: Yes  Weight loss: Yes  Weight gain: No  Fatigue: Yes  Night sweats: No  Chills: No  Increased stress: No  Excessive hunger: No  Excessive thirst: No  Feeling hot or cold when others believe the temperature is normal: No  Loss of height: No  Post-operative complications: No  Surgical site pain: No  Hallucinations: No  Change in or Loss of Energy: No  Hyperactivity: No  Confusion: No  Ear pain: No  Ear discharge: No  Hearing loss: No  Tinnitus: No  Nosebleeds: No  Congestion: No  Sinus pain: No  Trouble swallowing: Yes   Voice hoarseness: Yes  Mouth sores: No  Sore throat: No  Tooth pain: No  Gum tenderness: No  Bleeding gums: No  Change in taste: No  Change in sense of smell: No  Dry mouth: Yes  Hearing aid used: No  Neck lump: No  Eye pain: No  Vision loss: No  Dry eyes: No  Watery eyes: No  Eye bulging: No  Double vision: No  Flashing of lights: No  Spots: No  Floaters: Yes  Redness: No  Crossed eyes: No  Tunnel Vision: No  Yellowing of eyes: No  Eye irritation: No  Cough: No  Sputum or phlegm: No  Coughing up blood: No  Difficulty breating or shortness of breath: Yes  Snoring: No  Wheezing: No  Difficulty breathing on exertion: Yes  Nighttime Cough: No  Difficulty breathing when lying flat: No  Chest pain or pressure: No  Fast or irregular heartbeat: No  Pain in legs with walking:  No  Trouble breathing while lying down: No  Fingers or toes appear blue: No  High blood pressure: Yes  Low blood pressure: Yes  Fainting: No  Murmurs: No  Pacemaker: No  Varicose veins: Yes  Edema or swelling: No  Wake up at night with shortness of breath: No  Light-headedness: Yes  Exercise intolerance: Yes  Trouble holding urine or incontinence: Yes  Pain or burning: No  Trouble starting or stopping: No  Increased frequency of urination: Yes  Blood in urine: No  Decreased frequency of urination: No  Frequent nighttime urination: Yes  Flank pain: No  Difficulty emptying bladder: No  Trouble with coordination: No  Dizziness or trouble with balance: Yes  Fainting or black-out spells: No  Memory loss: No  Headache: No  Seizures: No  Speech problems: No  Tingling: Yes  Tremor: No  Weakness: No  Difficulty walking: No  Paralysis: Yes  Numbness: Yes            ______________________________________      Patient was asked about 14 Review of systems including changes in vision (dry eyes, double vision), hearing, heart, lungs, musculoskeletal, depression, anxiety, snoring, RBD, insomnia, urinary frequency, urinary urgency, constipation, swallowing problems, hematological, ID, allergies, skin problems: seborrhea, endocrinological: thyroid, diabetes, cholesterol; balance, weight changes, and other neurological problems and these were not significant at this time except for   Patient Active Problem List   Diagnosis     Hemochromatosis     Pjual-equaec-hpfe disease, chronic (H)     Advanced directives, counseling/discussion     Polyneuropathy     Status post total knee replacements     Status post bone marrow transplant (H)     Hyperlipidemia with target LDL less than 100     Atrial fibrillation (H)     Chronic rhinitis     Gallstones without obstruction of gallbladder     Long-term (current) use of anticoagulants [Z79.01]     Thyroid nodule     Type 2 diabetes mellitus with stage 2 chronic kidney disease (H)     Renal  hypertension     History of Hodgkin's lymphoma     History of irradiation, presenting hazards to health     Primary pulmonary hypertension (H)     Hereditary hemochromatosis (H)     Oropharyngeal dysphagia     Articulation disorder     Personal history of PE (pulmonary embolism)     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)     Parkinson disease (H)     Abnormal dopamine scan (DaTSCAN) 2020     Chronic kidney disease, stage 3     ACP (advance care planning)          Allergies   Allergen Reactions     No Known Drug Allergy      Past Surgical History:   Procedure Laterality Date     ANESTHESIA CARDIOVERSION  4/21/2011    Procedure:ANESTHESIA CARDIOVERSION; Surgeon:GENERIC ANESTHESIA PROVIDER; Location:[facility] OR     ARTHROSCOPY KNEE RT/LT      LT     CATARACT IOL, RT/LT  2017     COLONOSCOPY  10/16/2012    Procedure: COLONOSCOPY;  Colonoscopy, screening;  Surgeon: Reg Feliciano MD;  Location: [facility] OR     COLONOSCOPY N/A 4/14/2021    Procedure: COLONOSCOPY;  Surgeon: Reg Holm MD;  Location: [facility] GI     ESOPHAGOSCOPY, GASTROSCOPY, DUODENOSCOPY (EGD), COMBINED N/A 10/7/2020    Procedure: ESOPHAGOGASTRODUODENOSCOPY, WITH BIOPSY;  Surgeon: Raul Sawyer DO;  Location: [facility] OR     H ABLATION FOCAL AFIB  3/5/2013    PVI     H ABLATION FOCAL AFIB  01/01/2018     HC BONE MARROW/STEM TRANSPLANT ALLOGENIC  5/8/2007     INSERT PORT VASCULAR ACCESS       JOINT REPLACEMTN, KNEE RT/LT  3/28/12    SHAWN     VASECTOMY  1990     Past Medical History:   Diagnosis Date     Abnormal dopamine scan (DaTSCAN) 2020 11/04/2020    [phone] 11/3/2020   Narrative & Impression   Examination: Nuclear medicine DATscan for Dopamine Receptor Localization.   Examination: NM Brain Image Tomographic (SPECT) DATscan   Date: 11/3/2020 3:21 PM   Indication: Parkinsonism   Comparison: None   Additional Information: none   Interfering Medications: None   Technique:   The patient initially received 1 ml of Lugol's solution orally prior      Antiphospholipid antibody syndrome (H) 2005    Ravi's     Antiphospholipid antibody syndrome (H)     Ravi's, noted negative per testing re-done in 2008 in hematology note 01/13/2009     Articulation disorder 09/23/2020     Atrial fibrillation (H) 04/2011     Cirrhosis (H)      CKD (chronic kidney disease) stage 2, GFR 60-89 ml/min      Diabetes mellitus type II     steroid induced     DJD (degenerative joint disease) of knee     SHAWN, worse on right     DVT (deep venous thrombosis) (H)      ED (erectile dysfunction)      Gallbladder polyp 05/2010     Pvfdm-Htgvdr-Sgua Disease 2007    BMT     Hemochromatosis      Hodgkin's disease NOS     5 cycles of ABVD in 2011     HTN (hypertension)      Hyperlipidaemia LDL goal <100      Multiple thyroid nodules     benign      Myeloproliferative disorder (H)     atypical, [facility]     Parkinson disease (H) 09/24/2020     PE (pulmonary embolism) 11/2004     Polyneuropathy      Primary pulmonary hypertension (H)      Social History     Socioeconomic History     Marital status:      Spouse name: Angelica     Number of children: 2     Years of education: 21     Highest education level: Not on file   Occupational History     Occupation:      Employer: [employer]     Employer: DISABLED   Social Needs     Financial resource strain: Not on file     Food insecurity     Worry: Not on file     Inability: Not on file     Transportation needs     Medical: Not on file     Non-medical: Not on file   Tobacco Use     Smoking status: Never Smoker     Smokeless tobacco: Never Used   Substance and Sexual Activity     Alcohol use: No     Drug use: No     Sexual activity: Not Currently     Partners: Female   Lifestyle     Physical activity     Days per week: Not on file     Minutes per session: Not on file     Stress: Not on file   Relationships     Social connections     Talks on phone: Not on file     Gets together: Not on file     Attends Orthodoxy service: Not on file     Active  member of club or organization: Not on file     Attends meetings of clubs or organizations: Not on file     Relationship status: Not on file     Intimate partner violence     Fear of current or ex partner: Not on file     Emotionally abused: Not on file     Physically abused: Not on file     Forced sexual activity: Not on file   Other Topics Concern     Parent/sibling w/ CABG, MI or angioplasty before 65F 55M? No   Social History Narrative     about 50 yrs        Wife has some health issues - back pain - second knee replaced    She had gastric bypass and a fib and bad mitral valve        Son in [place]    Daughter in [facility] with 8 yo son.         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  Medications and Vitals not listed above were documented in the cart and reviewed by me.     Current Outpatient Medications   Medication Sig Dispense Refill     acetaminophen (TYLENOL) 500 MG tablet 500mg tab as needed       amoxicillin (AMOXIL) 500 MG capsule 4 x 500mg tabs by mouth 1 hour before dental appointments.       bisacodyl (DULCOLAX) 5 MG EC tablet Refer to instructions sent via Snaps. 4 tablet 0     calcium carbonate (TUMS) 500 MG chewable tablet 1 tab by mouth in morning or at night as needed       flecainide (TAMBOCOR) 50 MG tablet Take 1 tablet (50 mg) by mouth 2 times daily 180 tablet 2     fluticasone (FLONASE) 50 MCG/ACT nasal spray Spray 1 spray into both nostrils daily as needed        loratadine (CLARITIN) 10 MG tablet Take 10 mg by mouth daily as needed        metoprolol tartrate (LOPRESSOR) 25 MG  tablet Take 0.5 tablets (12.5 mg) by mouth 2 times daily TAKE 1 TABLET BY MOUTH TWICE A DAY 90 tablet 3     multivitamin (CENTRUM SILVER) tablet Take 1 tablet by mouth daily       Nutritional Supplements (ENSURE PO)        polyethylene glycol (GOLYTELY) 236 g suspension Refer to instructions sent via Drizly. 4000 mL 0     polyethylene glycol (MIRALAX) 17 g packet Take 1 packet by mouth daily as needed for constipation       PREVIDENT 5000 DRY MOUTH 1.1 % GEL topical gel Take by mouth daily  3     senna-docusate (SENOKOT-S/PERICOLACE) 8.6-50 MG tablet Take 2 tablets by mouth daily       simvastatin (ZOCOR) 10 MG tablet Take 1 tablet (10 mg) by mouth daily 90 tablet 3     warfarin ANTICOAGULANT (COUMADIN) 2.5 MG tablet TAKE 1.25 MG (1/2 TABLET) MON/FRI AND 2.5 MG (1 TABLET) ALL OTHER DAYS. NEED APPT. 90 tablet 0         Medications                                                                                                                                                  Ángel Hill MD

## 2021-06-11 ENCOUNTER — TRANSFERRED RECORDS (OUTPATIENT)
Dept: HEALTH INFORMATION MANAGEMENT | Facility: CLINIC | Age: 71
End: 2021-06-11

## 2021-06-15 ENCOUNTER — ANTICOAGULATION THERAPY VISIT (OUTPATIENT)
Dept: FAMILY MEDICINE | Facility: CLINIC | Age: 71
End: 2021-06-15

## 2021-06-15 ENCOUNTER — PRE VISIT (OUTPATIENT)
Dept: GASTROENTEROLOGY | Facility: CLINIC | Age: 71
End: 2021-06-15

## 2021-06-15 ENCOUNTER — VIRTUAL VISIT (OUTPATIENT)
Dept: GASTROENTEROLOGY | Facility: CLINIC | Age: 71
End: 2021-06-15
Attending: FAMILY MEDICINE
Payer: COMMERCIAL

## 2021-06-15 DIAGNOSIS — K74.60 CIRRHOSIS OF LIVER NOT DUE TO ALCOHOL (H): ICD-10-CM

## 2021-06-15 DIAGNOSIS — R63.4 WEIGHT LOSS: ICD-10-CM

## 2021-06-15 DIAGNOSIS — R13.10 DYSPHAGIA, UNSPECIFIED TYPE: ICD-10-CM

## 2021-06-15 DIAGNOSIS — I48.0 PAROXYSMAL ATRIAL FIBRILLATION (H): ICD-10-CM

## 2021-06-15 DIAGNOSIS — Z79.01 LONG TERM CURRENT USE OF ANTICOAGULANT THERAPY: ICD-10-CM

## 2021-06-15 DIAGNOSIS — R70.0 ELEVATED SED RATE: ICD-10-CM

## 2021-06-15 LAB — INR PPP: 2.1 (ref 0.9–1.1)

## 2021-06-15 PROCEDURE — 99212 OFFICE O/P EST SF 10 MIN: CPT | Mod: GT | Performed by: INTERNAL MEDICINE

## 2021-06-15 NOTE — PROGRESS NOTES
"Haresh is a 71 year old who is being evaluated via a billable video visit.      How would you like to obtain your AVS? MyChart  If the video visit is dropped, the invitation should be resent by: Text to cell phone: [phone]  Will anyone else be joining your video visit? No  {If patient encounters technical issues they should call [phone] :[id number]}    Video Start Time: {video visit start/end time for provider to select:152948}  Video-Visit Details    Type of service:  Video Visit    Video End Time:{video visit start/end time for provider to select:152948}    Originating Location (pt. Location): {video visit patient location:[id number]::\"Home\"}    Distant Location (provider location):  [facility] [place]     Platform used for Video Visit: {Virtual Visit Platforms:[id number]::\"[facility]\"}      Donna RAMIREZ CMA"

## 2021-06-15 NOTE — LETTER
6/15/2021     RE: Haresh Rock  [address] [place] [address]    Dear Colleague,    Thank you for referring your patient, Haresh Rock, to the [facility] HEPATOLOGY [facility] [place]. Please see a copy of my visit note below.    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 Anya Hanley, Dr. 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 No phlebotmy need for over a decade.  Last followup was 10 years ago. He has a multitude of other medical diagnoses and recently has experienced 50 pounds weight loss. I do not think his cirrhosis is contributing to his decline.    He has normal liver tests, no evidence of portal hypertension (nl EGD, plt stable just below 150 and other reasons for this) and no masses in his liver on last 2 imaging studies in 2 years.    I cannot ascribe his weight loss/cachexia to his liver based on what I am seeing.     He should continue to get HCC screening every 6 months and can follow up in the liver clinic yearly, pending the status of the rest of his medical issues and his overall health.  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    He has lost 50 pounds.   He saw Dr. Sawyer for dysphagia. EGD 10/7/20  fairly unremarkable. Intermittent aspiration.     He does not have diarrhea, he has constipation.    Recent diagnosis of Parkinson's. He states there is question if this is the cause of his decline.  CT 2/26/21 no liver masses.    He was seen several years ago by Dr. Kiet Sosa. LV 2010.     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    Lab Test 05/25/21  1509   PROTTOTAL 7.6   ALBUMIN 3.3*   BILITOTAL 0.7   ALKPHOS 127   AST 23   ALT 19     Lab Test 06/08/21  1755   WBC 7.5   RBC 4.62   HGB 15.0   HCT 42.6   MCV 92   MCH 32.5   MCHC 35.2   RDW 13.3   *     Past Medical History:   Diagnosis Date     Abnormal dopamine scan (DaTSCAN) 2020 11/04/2020    [phone] 11/3/2020   Narrative & Impression   Examination: Nuclear medicine DATscan for Dopamine Receptor Localization.   Examination: NM Brain Image Tomographic (SPECT) DATscan   Date: 11/3/2020 3:21 PM   Indication: Parkinsonism   Comparison: None   Additional Information: none   Interfering Medications: None   Technique:   The patient initially received 1 ml of Lugol's solution orally prior     Antiphospholipid antibody syndrome (H) 2005    Ravi's     Antiphospholipid antibody syndrome (H)     Ravi's, noted negative per testing re-done in 2008 in hematology note 01/13/2009     Articulation disorder 09/23/2020     Atrial fibrillation (H) 04/2011     Cirrhosis (H)      CKD (chronic kidney disease) stage 2, GFR 60-89 ml/min      Diabetes mellitus type II     steroid induced     DJD (degenerative joint disease) of knee     SHAWN, worse on right     DVT (deep venous thrombosis) (H)      ED (erectile dysfunction)      Gallbladder polyp 05/2010     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   . Ret .  "Has PhD.  Family History   Problem Relation Age of Onset     Hypertension Mother      Cerebrovascular Disease Mother      Breast Cancer Mother      Arrhythmia Brother      Arrhythmia Sister         supraventricular tachycardia     Thyroid Disease Sister      Other - See Comments Son         lives in [place]. no kids and not .     Obsessive Compulsive Disorder Son      Depression Son      Eating Disorder Son         eats when depressed     Obesity Son      Thyroid Cancer Daughter         had thyroid removed     Bipolar Disorder Daughter      Other - See Comments Daughter         lives in [place] - single with one son 9  yrs     Other - See Comments Sister      Alzheimer Disease Father      Diabetes Paternal Aunt      Gastrointestinal Disease Maternal Grandmother         colitis      Parkinsonism Other         2 cousins with parkinson     C.A.D. No family hx of      ROS 10 point ROS neg other than the symptoms noted above in the HPI.    Exam  Gen Alert pleasant NAD. Cachectic appearing  Resp No difficulty breathing. No cough  Skin No Jaundice  Eyes No icterus  Neuro SMITH  MSK ++ muscle wasting  Psyche Pleasant, appropriate. Well groomed.    Haresh Rock is a 71 year old male who is being evaluated via a billable video visit.      The patient has been notified of following:   \"This video visit will be conducted via a call between you and your physician/provider. We have found that certain health care needs can be provided without the need for an in-person physical exam.  This service lets us provide the care you need with a video conversation.  If a prescription is necessary we can send it directly to your pharmacy.  If lab work is needed we can place an order for that and you can then stop by our lab to have the test done at a later time. Video visits are billed at different rates depending on your insurance coverage.  Please reach out to your insurance provider with any questions.  If during the " "course of the call the physician/provider feels a video visit is not appropriate, you will not be charged for this service.\"   Patient has given verbal consent for Video visit? Yes    Type of service:  Video Visit  Video Start Time 1032  Video End Time 1050  Patient location: home  Will anyone else be joining your video visit? no  {If patient encounters technical issues they should call [phone] :1  Distant Location (provider location):  [facility] HEPATOLOGY [facility] [place]   Mode of Communication:  Video Conference via [employer]  I have reviewed and updated the patient's Past Medical History, Social History, Family History and Medication List.    Again, thank you for allowing me to participate in the care of your patient.      Sincerely,      Chelsey Crews MD  "

## 2021-06-15 NOTE — PROGRESS NOTES
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 Anya Hanley, Dr. 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 No phlebotmy need for over a decade.  Last followup was 10 years ago. He has a multitude of other medical diagnoses and recently has experienced 50 pounds weight loss. I do not think his cirrhosis is contributing to his decline.    He has normal liver tests, no evidence of portal hypertension (nl EGD, plt stable just below 150 and other reasons for this) and no masses in his liver on last 2 imaging studies in 2 years.    I cannot ascribe his weight loss/cachexia to his liver based on what I am seeing.     He should continue to get HCC screening every 6 months and can follow up in the liver clinic yearly, pending the status of the rest of his medical issues and his overall health.  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    He has lost 50 pounds.   He saw Dr. Sawyer for dysphagia. EGD 10/7/20  fairly unremarkable. Intermittent aspiration.     He does not have diarrhea, he has constipation.    Recent diagnosis of Parkinson's. He states there is question if this is the cause of his decline.  CT 2/26/21 no liver masses.    He was seen several years ago by Dr. Kiet Sosa. LV 2010.     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    Lab Test 05/25/21  1509   PROTTOTAL 7.6   ALBUMIN 3.3*   BILITOTAL 0.7   ALKPHOS 127   AST  23   ALT 19     Lab Test 06/08/21  1755   WBC 7.5   RBC 4.62   HGB 15.0   HCT 42.6   MCV 92   MCH 32.5   MCHC 35.2   RDW 13.3   *     Past Medical History:   Diagnosis Date     Abnormal dopamine scan (DaTSCAN) 2020 11/04/2020    [phone] 11/3/2020   Narrative & Impression   Examination: Nuclear medicine DATscan for Dopamine Receptor Localization.   Examination: NM Brain Image Tomographic (SPECT) DATscan   Date: 11/3/2020 3:21 PM   Indication: Parkinsonism   Comparison: None   Additional Information: none   Interfering Medications: None   Technique:   The patient initially received 1 ml of Lugol's solution orally prior     Antiphospholipid antibody syndrome (H) 2005    Ravi's     Antiphospholipid antibody syndrome (H)     Ravi's, noted negative per testing re-done in 2008 in hematology note 01/13/2009     Articulation disorder 09/23/2020     Atrial fibrillation (H) 04/2011     Cirrhosis (H)      CKD (chronic kidney disease) stage 2, GFR 60-89 ml/min      Diabetes mellitus type II     steroid induced     DJD (degenerative joint disease) of knee     SHAWN, worse on right     DVT (deep venous thrombosis) (H)      ED (erectile dysfunction)      Gallbladder polyp 05/2010     Vnljx-Iccszb-Qiqu Disease 2007    BMT     Hemochromatosis      Hodgkin's disease NOS     5 cycles of ABVD in 2011     HTN (hypertension)      Hyperlipidaemia LDL goal <100      Multiple thyroid nodules     benign      Myeloproliferative disorder (H)     atypical, [facility]     Parkinson disease (H) 09/24/2020     PE (pulmonary embolism) 11/2004     Polyneuropathy      Primary pulmonary hypertension (H)      Thrombocytopenia (H) 6/8/2021     SHX  Never used alcohol.   . Ret . Has PhD.  Family History   Problem Relation Age of Onset     Hypertension Mother      Cerebrovascular Disease Mother      Breast Cancer Mother      Arrhythmia Brother      Arrhythmia Sister         supraventricular tachycardia     Thyroid Disease Sister       "Other - See Comments Son         lives in [place]. no kids and not .     Obsessive Compulsive Disorder Son      Depression Son      Eating Disorder Son         eats when depressed     Obesity Son      Thyroid Cancer Daughter         had thyroid removed     Bipolar Disorder Daughter      Other - See Comments Daughter         lives in [place] - single with one son 9  yrs     Other - See Comments Sister      Alzheimer Disease Father      Diabetes Paternal Aunt      Gastrointestinal Disease Maternal Grandmother         colitis      Parkinsonism Other         2 cousins with parkinson     C.A.D. No family hx of      ROS 10 point ROS neg other than the symptoms noted above in the HPI.    Exam  Gen Alert pleasant NAD. Cachectic appearing  Resp No difficulty breathing. No cough  Skin No Jaundice  Eyes No icterus  Neuro SMITH  MSK ++ muscle wasting  Psyche Pleasant, appropriate. Well groomed.    Haresh Rock is a 71 year old male who is being evaluated via a billable video visit.      The patient has been notified of following:   \"This video visit will be conducted via a call between you and your physician/provider. We have found that certain health care needs can be provided without the need for an in-person physical exam.  This service lets us provide the care you need with a video conversation.  If a prescription is necessary we can send it directly to your pharmacy.  If lab work is needed we can place an order for that and you can then stop by our lab to have the test done at a later time. Video visits are billed at different rates depending on your insurance coverage.  Please reach out to your insurance provider with any questions.  If during the course of the call the physician/provider feels a video visit is not appropriate, you will not be charged for this service.\"   Patient has given verbal consent for Video visit? Yes    Type of service:  Video Visit  Video Start Time 1032  Video End Time " 3691  Patient location: home  Will anyone else be joining your video visit? no  {If patient encounters technical issues they should call [phone] :1  Distant Location (provider location):  [facility] HEPATOLOG[facility] [place]   Mode of Communication:  Video Conference via [employer]  I have reviewed and updated the patient's Past Medical History, Social History, Family History and Medication List.

## 2021-06-15 NOTE — PROGRESS NOTES
ANTICOAGULATION  MANAGEMENT-Patient Home Monitoring Result    Assessment     Therapeutic INR result of 2.1 . Goal range 2.0-3.0. Received via fax from [employer] home INR monitoring company.        Previous INR was therapeutic    Haresh was last contacted by phone: 21    Plan     Per home monitoring agreement with patient, patient was NOT contacted regarding therapeutic result today.  Patient is to continue current dose and continue to check INR with home monitor per protocol.  ?       OBJECTIVE    INR   Date Value Ref Range Status   2021 2.1 (A) 0.90 - 1.10 Final     Factor 2 Assay   Date Value Ref Range Status   2007 58 (L) 60 - 140 % Final     Comment:     (Note)  CALLED TO TAMARA(INTERV. RADIOLOGY)[id number],        ASSESSMENT / PLAN  No question data found.  Anticoagulation Summary  As of 6/15/2021    INR goal:  2.0-3.0   TTR:  61.9 % (1 y)   INR used for dosin.1 (2021)   Warfarin maintenance plan:  2.5 mg (2.5 mg x 1) every day   Full warfarin instructions:  2.5 mg every day   Weekly warfarin total:  17.5 mg   Plan last modified:  Allyn Doe, RN (3/5/2021)   Next INR check:     Priority:  Maintenance   Target end date:  Indefinite    Indications    Long-term (current) use of anticoagulants [Z79.01] [Z79.01]  Atrial fibrillation (H) [I48.91]  Antiphospholipid antibody syndrome (H) (Resolved) [D68.61]  Personal history of DVT (deep vein thrombosis) (Resolved) [Z86.718]  Atrial fibrillation  unspecified type (H) (Resolved) [I48.91]             Anticoagulation Episode Summary     INR check location:      Preferred lab:  EXTERNAL LAB    Send INR reminders to:  CHELSEA CHILEL    Comments:  REGAN rodas to manage by exception      Anticoagulation Care Providers     Provider Role Specialty Phone number    Anya Nowak MD Referring Family Medicine [phone]

## 2021-06-18 ENCOUNTER — TRANSFERRED RECORDS (OUTPATIENT)
Dept: HEALTH INFORMATION MANAGEMENT | Facility: CLINIC | Age: 71
End: 2021-06-18

## 2021-06-18 ENCOUNTER — PATIENT OUTREACH (OUTPATIENT)
Dept: CARE COORDINATION | Facility: CLINIC | Age: 71
End: 2021-06-18

## 2021-06-18 ENCOUNTER — ANTICOAGULATION THERAPY VISIT (OUTPATIENT)
Dept: FAMILY MEDICINE | Facility: CLINIC | Age: 71
End: 2021-06-18

## 2021-06-18 ENCOUNTER — DOCUMENTATION ONLY (OUTPATIENT)
Dept: FAMILY MEDICINE | Facility: CLINIC | Age: 71
End: 2021-06-18

## 2021-06-18 DIAGNOSIS — Z79.01 LONG TERM CURRENT USE OF ANTICOAGULANT THERAPY: ICD-10-CM

## 2021-06-18 DIAGNOSIS — I48.0 PAROXYSMAL ATRIAL FIBRILLATION (H): Primary | ICD-10-CM

## 2021-06-18 DIAGNOSIS — I48.0 PAROXYSMAL ATRIAL FIBRILLATION (H): ICD-10-CM

## 2021-06-18 LAB — INR PPP: 3.9 (ref 0.9–1.1)

## 2021-06-18 NOTE — PROGRESS NOTES
ANTICOAGULATION MANAGEMENT     Patient Name:  Haresh Rock  Date:  6/18/2021    ASSESSMENT /SUBJECTIVE:    Today's INR result of 3.9 is supratherapeutic. Goal INR of 2.0-3.0      Warfarin dose taken: Warfarin taken as instructed    Diet: Decreased appetite.    Medication changes/ interactions: No new medications/supplements affecting INR    Previous INR: Therapeutic     S/S of bleeding or thromboembolism: No    New injury or illness: No    Upcoming surgery, procedure or cardioversion: No    Additional findings: None. No findings to explain elevated INR except decreased appetite. This has been chronic over the last year however.      PLAN:    Warfarin Dosing Instructions: Hold today then change your warfarin dose to 1.25mg every Tue; 2.5mg all other days (7% change)    Instructed patient to follow up no later than: 1 week  Patient to recheck with home meter    Education provided: Target INR goal and significance of current INR result    Telephone call with Haresh whom verbalizes understanding and agrees to plan    Instructed to call the Anticoagulation Clinic for any changes, questions or concerns. (#[phone])        Hari Saavedra RN      OBJECTIVE:  Recent labs: (last 7 days)     06/18/21   INR 3.9*         No question data found.  Anticoagulation Summary  As of 6/18/2021    INR goal:  2.0-3.0   TTR:  62.2 % (1 y)   INR used for dosing:  3.9 (6/18/2021)   Warfarin maintenance plan:  2.5 mg (2.5 mg x 1) every day   Full warfarin instructions:  2.5 mg every day   Weekly warfarin total:  17.5 mg   Plan last modified:  Allyn Doe RN (3/5/2021)   Next INR check:     Priority:  Maintenance   Target end date:  Indefinite    Indications    Long-term (current) use of anticoagulants [Z79.01] [Z79.01]  Atrial fibrillation (H) [I48.91]  Antiphospholipid antibody syndrome (H) (Resolved) [D68.61]  Personal history of DVT (deep vein thrombosis) (Resolved) [Z86.718]  Atrial fibrillation  unspecified type (H) (Resolved)  [I48.91]             Anticoagulation Episode Summary     INR check location:      Preferred lab:  EXTERNAL LAB    Send INR reminders to:  CHELSEA CHILEL    Comments:  JESSICA okay to manage by exception      Anticoagulation Care Providers     Provider Role Specialty Phone number    Anya Nowak MD Referring Family Medicine [phone]

## 2021-06-18 NOTE — PROGRESS NOTES
ANTICOAGULATION MANAGEMENT      Haresh Rock due for annual renewal of referral to anticoagulation monitoring. Order pended for your review and signature.      ANTICOAGULATION SUMMARY      Warfarin indication(s)     Atrial fibrillation    Heart valve present?  NO       Current goal range   INR: 2.0-3.0     Goal appropriate for indication? Yes, INR 2-3 appropriate for hx of DVT, PE, hypercoagulable state, Afib, LVAD, or bileaflet AVR without risk factors     Current duration of therapy Indefinite/long term therapy   Time in Therapeutic Range (TTR)  (Goal > 60%) 62%       Office visit with referring provider's group within last year yes on 06/08/2021       Hari Saavedra RN

## 2021-06-22 NOTE — PROGRESS NOTES
ANTICOAGULATION FOLLOW-UP CLINIC VISIT    Patient Name:  Haresh Rock  Date:  5/23/2017  Contact Type:  Face to Face    SUBJECTIVE:     Patient Findings     Positives No Problem Findings           OBJECTIVE    INR Protime   Date Value Ref Range Status   05/23/2017 2.2 (A) 0.86 - 1.14 Final     Factor 2 Assay   Date Value Ref Range Status   05/01/2007 58 (L) 60 - 140 % Final     Comment:     (Note)  CALLED TO TAMARA(INTERV. RADIOLOGY)[id number],        ASSESSMENT / PLAN  No question data found.  Anticoagulation Summary as of 5/23/2017     INR goal 2.0-3.0   Today's INR 2.2   Maintenance plan 5 mg (5 mg x 1) on Tue, Thu; 2.5 mg (5 mg x 0.5) all other days   Full instructions 5 mg on Tue, Thu; 2.5 mg all other days   Weekly total 22.5 mg   No change documented Laverne Ferguson RN   Plan last modified Belinda Francis RN (4/19/2016)   Next INR check 7/6/2017   Priority INR   Target end date Indefinite    Indications   Long-term (current) use of anticoagulants [Z79.01] [Z79.01]  Atrial fibrillation (H) [I48.91]         Anticoagulation Episode Summary     INR check location     Preferred lab     Send INR reminders to [facility]    Comments       Anticoagulation Care Providers     Provider Role Specialty Phone number    Anya Nowak MD [facility] [phone]            See the Encounter Report to view Anticoagulation Flowsheet and Dosing Calendar (Go to Encounters tab in chart review, and find the Anticoagulation Therapy Visit)    RN reviewed patient's weekly warfarin dose. Pt INR remains within therapeutic range. Pt will continue with current dosing and monitoring as planned, based on physician directed care plan.      Laverne Ferguson, KOSTAS                Awake/Alert

## 2021-06-23 ENCOUNTER — HOSPITAL ENCOUNTER (OUTPATIENT)
Dept: OCCUPATIONAL THERAPY | Facility: CLINIC | Age: 71
Setting detail: THERAPIES SERIES
End: 2021-06-23
Attending: FAMILY MEDICINE
Payer: COMMERCIAL

## 2021-06-23 DIAGNOSIS — G20.A1 PARKINSON DISEASE (H): ICD-10-CM

## 2021-06-23 DIAGNOSIS — G62.9 NEUROPATHY: ICD-10-CM

## 2021-06-23 PROCEDURE — 97166 OT EVAL MOD COMPLEX 45 MIN: CPT | Mod: GO | Performed by: OCCUPATIONAL THERAPIST

## 2021-06-23 ASSESSMENT — VISUAL ACUITY: OU: WFL

## 2021-06-23 NOTE — PROGRESS NOTES
06/23/21 1200   Quick Adds   Type of Visit Initial Outpatient Occupational Therapy Evaluation       Present No   General Information   Start Of Care Date 06/23/21   Referring Physician Ángel Hill MD   Orders Evaluate and treat as indicated  (General evaluation and cognitive assessment (he is driving))   Other Orders PT referral set for 7/1/21, Neurology and Optometry referral placed   Orders Date 06/10/21   Medical Diagnosis Parkinson disease (H)   Onset of Illness/Injury or Date of Surgery 06/10/21   Surgical/Medical History Reviewed Yes   Additional Occupational Profile Info/Pertinent History of Current Problem Pt is a 70 yo male who was recently seen by PCP on 6/10/21. 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 Referred for general evaluation with MD requesting a general evaluation and cognitive assessment (he is driving, etc.). Referral placed with OP occupational therapy following initial referral to  but did not qualify due to not being homebound. Pt presented to evaluation by himself walking independently to therapy room for evaluation.    Role/Living Environment   Current Community Support Family/friend caregiver  (Reports that wife is home, but does not assist with anything)   Patient role/Employment history Retired  ( )   Community/Avocational Activities [employer], small social group (bible study, over zoom), belongs to 4 different computer groups   Current Living Environment House  (5 level home )   Number of Stairs to Enter Home 2 steps to get into home from the front door, no railings   Number of Stairs Within Home 3-4 steps to the rec room, 8-9 steps up to ground level, 5 steps up to living room from ground level, 7-8 steps to get from living room to loft. Railings on all steps, top two stairways have railings on both sides, ground level to rec room railing on R side  going down stairs   Primary Bathroom Location/Comments Bathroom located on ground level, uses walk in shower on the lowest level   (12 steps to get to shower he uses few times a week)   Primary Bathroom Set Up/Equipment Shower stall;Shower grab bar;Shower/tub chair;Hand held shower;Raised toilet seat;Raised toilet seat with arms  (Loaned his raised toilet seat with handles to friend)   Additional Bathroom Location/Comments Main (ground) level   Additional Bathroom Set Up/Equipment Shower stall;Shower grab bar   Prior Level - Transfers Independent   Prior Level - Ambulation Independent  (Occasionally uses cane, has FWW, 4WW )   Prior Level - ADLS Independent   Prior Responsibilities - IADL Meal Preparation;Housekeeping;Laundry;Shopping;Yardwork;Medication management;Finances;Driving  (Groceries from cub delivery, mows lawn, 28 container meds)   Current Assistive Devices - ADL Bath sponge;Reacher;Sock aide;Long shoe horn   Role/Living Environment Comments Mowed the lawn (took 4 breaks)   Patient/family Goals Statement To remain as independent as possible for as long as possible and receive adaptations where needed.   Pain   Patient currently in pain No   Fall Risk Screen   Fall screen completed by OT   Have you fallen 2 or more times in the past year? No  (once in past year on threshold of front door)   Abuse Screen (yes response referral indicated)   Feels Unsafe at Home or Work/School no   Feels Threatened by Someone no   Does Anyone Try to Keep You From Having Contact with Others or Doing Things Outside Your Home? no   Physical Signs of Abuse Present no   Cognitive Status Examination   Orientation Orientation to person, place and time   Level of Consciousness Alert   Follows Commands and Answers Questions 100% of the time   Memory Impaired   Attention Reports problems attending   Cognitive Comment MoCA 8.1 completed. Score 24/30. Difficulty noted with visuospacial/executive (4/5), attention with numbers (2/3) and  verbal attention (0/1), language (0/1), and delayed recall (3/5). Further assessment indicated.     Visual Perception   Visual Perception Wears glasses  (WFL)   Visual Acuity WFL    Visual Perception Comments WFL   Posture   Posture Forward head position   Range of Motion (ROM)   ROM Comments WFL. Further assessment indicated.    Strength   Strength Comments WFL. Further assessment indicated.    Hand Strength   Hand Dominance Right   Hand Strength Comments WFL. Further assessment indicated.    Muscle Tone   Muscle Tone No deficits were identified   Coordination   Coordination Comments WFL. Further assessment indicated.    Transfer Skill   Level of [place]: Transfers independent   Bathing   Level of [place] - Bathing independent  (Uses long handled sponge and shower chair)   Upper Body Dressing   Level of [place]: Dress Upper Body independent   Lower Body Dressing   Level of [place]: Dress Lower Body independent   Toileting   Level of [place]: Toilet independent   Grooming   Level of [place]: Grooming independent   Eating/Self-Feeding   Level of [place]: Eating independent   Activity Tolerance   Activity Tolerance Reduced endurance   Instrumental Activities of Daily Living Assessment   IADL Quick Adds Communication/Computer Use;Community Mobility   Communication/Computer Use Uses computer frequently and is concerned that continual decrease in coordination will impact function especially with typing and mouse use   Community Mobility Continues to drive, but wants to go through pre-readiness assessments to determine safety with driving.   Planned Therapy Interventions   Planned Therapy Interventions IADL training;Cognitive performance testing;Coordination training;Self care/Home management;Strengthening;Therapeutic activities   Adult OT Eval Goals   OT Eval Goals (Adult) 1;2;3;4;5;6    OT Goal 1   Goal Identifier Patient and Family Home Programming   Goal Description Pt and family  will verbalize understanding and demonstrate appropriate level of support through utilization and compliance of home programming that pt requires for home management, ADLs, and community mobility.    Target Date 09/20/21    OT Goal 2   Goal Identifier Adaptive Equipment   Goal Description Patient to verbalize understanding of and demonstrate use of 3-5 pieces of adaptive equipment and/or adapted techniques to increase his independence and safety with ADL/IADLs.   Target Date 09/20/21    OT Goal 3   Goal Identifier Community Mobility   Goal Description Patient will demonstrate WFLs UE motor and visual reaction time for return to independent community mobility (crossing the street, driving, alternative mobility, etc.) by scoring WFL on all modes of the Dyn[employer].    Target Date 09/20/21   OT Goal 4   Goal Identifier Fatigue Management   Goal Description Patient will increase awareness and identify 2-3 energy conservation/work simplification techniques that he will utilize for increased ADL/IADL independence, performance, and safety.   Target Date 09/20/21   OT Goal 5   Goal Identifier Cognitive Performance Test   Goal Description Patient and family to verbalize understanding of Cognitive Performance Test results and recommendations and identify 3-5 strategies to increase patient's safety in his home setting.   Target Date 09/20/21    OT Goal 6   Goal Identifier Memory and Attention    Goal Description Patient will identify and demonstrate 2-3 memory compensation techniques to increase independence and safety with I/ADL tasks.   Target Date 09/20/21   Clinical Impression   Criteria for Skilled Therapeutic Interventions Met Yes, treatment indicated   OT Diagnosis I/ADL impairment   Influenced by the following impairments Parkinsons   Assessment of Occupational Performance 3-5 Performance Deficits   Identified Performance Deficits Driving, home maintenance, dressing, bathing   Clinical Decision Making (Complexity)  Moderate complexity   Therapy Frequency 1x/week   Predicted Duration of Therapy Intervention (days/wks) 90 days as needed   Risks and Benefits of Treatment have been explained. Yes   Patient, Family & other staff in agreement with plan of care Yes   Clinical Impression Comments Recommend client receive skilled OT for fatigue management, memory/attention conpensation, I/ADL deficits, and education/training with AE.    Education Assessment   Barriers To Learning No Barriers   Preferred Learning Style Reading;Demonstration;Pictures/video   Total Evaluation Time   OT Eval, Moderate Complexity Minutes (81457) 40

## 2021-06-23 NOTE — PROGRESS NOTES
[facility]    OUTPATIENT OCCUPATIONAL THERAPY  EVALUATION  PLAN OF TREATMENT FOR OUTPATIENT REHABILITATION  (COMPLETE FOR INITIAL CLAIMS ONLY)  Patient's Last Name, First Name, M.I.  YOB: 1950  EveliaHaresh luevano                        Provider's Name  [facility] Medical Record No.  [id number]                               Onset Date:     06/10/21   Start of Care Date:     06/23/21   Type:     ___PT   _X_OT   ___SLP Medical Diagnosis:     Parkinson disease (H)                          OT Diagnosis:     I/ADL impairment Visits from SOC:  1   _________________________________________________________________________________  Plan of Treatment/Functional Goals:  IADL training, Coordination training, Self care/Home management, Strengthening, Therapeutic activities    Goals  Goal Identifier: Patient and Family Home Programming  Goal Description: Pt and family will verbalize understanding and demonstrate appropriate level of support through utilization and compliance of home programming that pt requires for home management, ADLs, and community mobility.   Target Date: 09/20/21     Goal Identifier: Adaptive Equipment  Goal Description: Patient to verbalize understanding of and demonstrate use of 3-5 pieces of adaptive equipment and/or adapted techniques to increase his independence and safety with ADL/IADLs.  Target Date: 09/20/21     Goal Identifier: Community Mobility  Goal Description: Patient will demonstrate WFLs UE motor and visual reaction time for return to independent community mobility (crossing the street, driving, alternative mobility, etc.) by scoring WFL on all modes of the [employer].   Target Date: 09/20/21     Goal Identifier: Fatigue Management  Goal Description: Patient will increase awareness and identify 2-3 energy conservation/work simplification  techniques that he will utilize for increased ADL/IADL independence, performance, and safety.  Target Date: 09/20/21     Goal Identifier: Cognitive Performance Test  Goal Description: Patient and family to verbalize understanding of Cognitive Performance Test results and recommendations and identify 3-5 strategies to increase patient's safety in his home setting.  Target Date: 09/20/21     Goal Identifier: Memory and Attention   Goal Description: Patient will identify and demonstrate 2-3 memory compensation techniques to increase independence and safety with I/ADL tasks.  Target Date: 09/20/21     Therapy Frequency: 1x/week     Predicted Duration of Therapy Intervention (days/wks): 90 days as needed  Macrina Luna, OTR/L           I CERTIFY THE NEED FOR THESE SERVICES FURNISHED UNDER        THIS PLAN OF TREATMENT AND WHILE UNDER MY CARE     (Physician co-signature of this document indicates review and certification of the therapy plan).                     Referring Physician: Ángel Hill MD    Certification dates: 06/23/21 to 9/20/21       Initial Assessment        See Epic Evaluation     Agree with plan  Ángle Hill MD  June 23, 2021

## 2021-06-24 ENCOUNTER — E-CONSULT (OUTPATIENT)
Dept: RHEUMATOLOGY | Facility: CLINIC | Age: 71
End: 2021-06-24
Payer: COMMERCIAL

## 2021-06-24 ENCOUNTER — TELEPHONE (OUTPATIENT)
Dept: FAMILY MEDICINE | Facility: CLINIC | Age: 71
End: 2021-06-24

## 2021-06-24 DIAGNOSIS — R70.0 ELEVATED SED RATE: Primary | ICD-10-CM

## 2021-06-24 PROCEDURE — 99451 NTRPROF PH1/NTRNET/EHR 5/>: CPT | Performed by: INTERNAL MEDICINE

## 2021-06-24 NOTE — TELEPHONE ENCOUNTER
Spoke with patient. Patient given provider message as written. He had no further questions.     Karin Alfonso RN

## 2021-06-24 NOTE — TELEPHONE ENCOUNTER
Please call patient: You can review your rheumatology econsult in your MyChart. The cause of your elevated sed rate is not clear. As you have already undergone colonoscopy and have seen your GI specialist, I recommend that you schedule a lab only urine test and schedule consult with an infectious disease specialist. I have made a referral to: [facility]: [facility] [place] [phone]     Anya Nowak MD

## 2021-06-24 NOTE — PROGRESS NOTES
ALL SMARTFIELDS MUST BE COMPLETED FOR PATIENT CARE AND BILLING    6/24/2021     E-Consult has been accepted.    Interprofessional consultation requested by:  Anya Nowak MD      Clinical Question/Purpose: MY CLINICAL QUESTION IS: 71 year old male with elevated sed rate and CRP of unknown etiology. 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 He has progressive weight loss over several months. Upon extensive lab evaluation he was found to have ESR of 92 on 2/15/21, persistently elevated to 106, despite lack of evidence of infection, rash, joint pain, joint swelling, or headache. He has mild thrombocytopenia. See additional lab testing performed, with normal RF and CCP.     Patient assessment and information reviewed: Reviewed the referring providers note from June 8, as well as notes from neurology and gastroenterology.  Reviewed laboratory and imaging data.  Impression: Persistently and markedly elevated sedimentation rate with modest CRP elevation in a patient with dysphagia and 50 pound weight loss.  I do not think that comorbid conditions (Parkinson's, hemochromatosis, history of malignancy with bone marrow transplant) are likely contributors to elevated inflammatory markers.  Primary provider has performed appropriate review of systems to assess for symptoms suggestive of systemic rheumatic disease.  There are no localizing signs of infection.  I note that PET scan performed early in 2021 indicated thickening at the anorectal junction.  In 15% of diagnosed cases, giant cell arteritis can present without significant cranial symptoms.    Recommendations: I recommend consulting with gastroenterology and/or colorectal surgery regarding the PET appearance. Consultation with  infectious disease may also be warranted, given the pulmonary abnormalities noted on PET I recommend obtaining urinalysis. If malignancy or infection is not suggested by the PET scan findings, and if weight loss continues, consider temporal artery biopsy.        The recommendations provided in this E-Consult are based on the clinical data available to me at this time, and are furnished without the benefit of a comprehensive in-person or virtual patient evaluation, Any new clinical issues or changes in patient status since the filing of this E-Consult will need to be taken into account when assessing these recommendations. Please contact me if you have further questions.    My total time spent reviewing clinical information and formulating assessment was 15 minutes.    Report sent automatically to requesting provider once signed.     Charge codes - [id number] (5+ minutes) or [id number] (No charge code)    Hari Olson MD

## 2021-06-25 ENCOUNTER — ANTICOAGULATION THERAPY VISIT (OUTPATIENT)
Dept: FAMILY MEDICINE | Facility: CLINIC | Age: 71
End: 2021-06-25

## 2021-06-25 ENCOUNTER — TRANSFERRED RECORDS (OUTPATIENT)
Dept: HEALTH INFORMATION MANAGEMENT | Facility: CLINIC | Age: 71
End: 2021-06-25

## 2021-06-25 DIAGNOSIS — I48.0 PAROXYSMAL ATRIAL FIBRILLATION (H): ICD-10-CM

## 2021-06-25 DIAGNOSIS — Z79.01 LONG TERM CURRENT USE OF ANTICOAGULANT THERAPY: ICD-10-CM

## 2021-06-25 LAB — INR PPP: 1.8 (ref 0.9–1.1)

## 2021-06-25 NOTE — PROGRESS NOTES
ANTICOAGULATION MANAGEMENT     Haresh Rock 71 year old male is on warfarin with subtherapeutic INR result. (Goal INR 2.0-3.0)    Recent labs: (last 7 days)     06/25/21   INR 1.8*       ASSESSMENT     Source(s): Patient/Caregiver Call       Warfarin doses taken: Warfarin taken as instructed    Diet: No new diet changes identified    New illness, injury, or hospitalization: Yes: Dx parkinsons    Medication/supplement changes: Vit D discontinued    Signs or symptoms of bleeding or clotting: No    Previous INR: Supratherapeutic    Additional findings: Seeing infectious disease for elevated sed rate     PLAN     Recommended plan for temporary change(s) affecting INR     Dosing Instructions:  Increase your warfarin dose (7.7% change) with next INR in 1 week       Summary  As of 6/25/2021    Full warfarin instructions:  2.5 mg every day   Next INR check:  7/2/2021             Telephone call with Haresh springer understanding and agrees to plan    Patient to recheck with home meter    Education provided: Please call back if any changes to your diet, medications or how you've been taking warfarin, Monitoring for bleeding signs and symptoms and Monitoring for clotting signs and symptoms    Plan made per [facility] anticoagulation protocol    Allyn Doe, RN  Anticoagulation Clinic  6/25/2021    _______________________________________________________________________     Anticoagulation Episode Summary     Current INR goal:  2.0-3.0   TTR:  62.0 % (1 y)   Target end date:  Indefinite   Send INR reminders to:  CHELSEA CHILEL    Indications    Long-term (current) use of anticoagulants [Z79.01] [Z79.01]  Atrial fibrillation (H) [I48.91]  Antiphospholipid antibody syndrome (H) (Resolved) [D68.61]  Personal history of DVT (deep vein thrombosis) (Resolved) [Z86.718]  Atrial fibrillation  unspecified type (H) (Resolved) [I48.91]  Paroxysmal atrial fibrillation (H) [I48.0]           Comments:  JESSICA okay to manage by exception          Anticoagulation Care Providers     Provider Role Specialty Phone number    Anya Nowak MD Referring Family Medicine [phone]

## 2021-06-30 DIAGNOSIS — R70.0 ELEVATED SED RATE: ICD-10-CM

## 2021-06-30 LAB
ALBUMIN UR-MCNC: NEGATIVE MG/DL
APPEARANCE UR: CLEAR
BILIRUB UR QL STRIP: NEGATIVE
COLOR UR AUTO: YELLOW
GLUCOSE UR STRIP-MCNC: NEGATIVE MG/DL
HGB UR QL STRIP: NEGATIVE
KETONES UR STRIP-MCNC: NEGATIVE MG/DL
LEUKOCYTE ESTERASE UR QL STRIP: NEGATIVE
NITRATE UR QL: NEGATIVE
PH UR STRIP: 5.5 PH (ref 5–7)
SOURCE: NORMAL
SP GR UR STRIP: >1.03 (ref 1–1.03)
UROBILINOGEN UR STRIP-ACNC: 0.2 EU/DL (ref 0.2–1)

## 2021-06-30 PROCEDURE — 81003 URINALYSIS AUTO W/O SCOPE: CPT | Performed by: FAMILY MEDICINE

## 2021-07-01 ENCOUNTER — HOSPITAL ENCOUNTER (OUTPATIENT)
Dept: PHYSICAL THERAPY | Facility: CLINIC | Age: 71
Setting detail: THERAPIES SERIES
End: 2021-07-01
Attending: FAMILY MEDICINE
Payer: COMMERCIAL

## 2021-07-01 ENCOUNTER — HOSPITAL ENCOUNTER (OUTPATIENT)
Dept: OCCUPATIONAL THERAPY | Facility: CLINIC | Age: 71
Setting detail: THERAPIES SERIES
End: 2021-07-01
Payer: COMMERCIAL

## 2021-07-01 DIAGNOSIS — M21.372 FOOT DROP, LEFT: ICD-10-CM

## 2021-07-01 PROCEDURE — 97535 SELF CARE MNGMENT TRAINING: CPT | Mod: GO,59 | Performed by: OCCUPATIONAL THERAPIST

## 2021-07-01 PROCEDURE — 97116 GAIT TRAINING THERAPY: CPT | Mod: GP | Performed by: PHYSICAL THERAPIST

## 2021-07-01 PROCEDURE — 97530 THERAPEUTIC ACTIVITIES: CPT | Mod: GO | Performed by: OCCUPATIONAL THERAPIST

## 2021-07-01 PROCEDURE — 97161 PT EVAL LOW COMPLEX 20 MIN: CPT | Mod: GP | Performed by: PHYSICAL THERAPIST

## 2021-07-02 ENCOUNTER — TRANSFERRED RECORDS (OUTPATIENT)
Dept: HEALTH INFORMATION MANAGEMENT | Facility: CLINIC | Age: 71
End: 2021-07-02

## 2021-07-02 ENCOUNTER — ANTICOAGULATION THERAPY VISIT (OUTPATIENT)
Dept: FAMILY MEDICINE | Facility: CLINIC | Age: 71
End: 2021-07-02

## 2021-07-02 DIAGNOSIS — I48.0 PAROXYSMAL ATRIAL FIBRILLATION (H): ICD-10-CM

## 2021-07-02 DIAGNOSIS — Z79.01 LONG TERM CURRENT USE OF ANTICOAGULANT THERAPY: ICD-10-CM

## 2021-07-02 LAB — INR PPP: 1.8 (ref 0.9–1.1)

## 2021-07-02 NOTE — PROGRESS NOTES
ANTICOAGULATION MANAGEMENT     Haresh Rock 71 year old male is on warfarin with subtherapeutic INR result. (Goal INR 2.0-3.0)    Recent labs: (last 7 days)     07/02/21   INR 1.8*       ASSESSMENT     Source(s): Chart Review and Patient/Caregiver Call       Warfarin doses taken: Warfarin taken as instructed    Diet: No new diet changes identified    New illness, injury, or hospitalization: No    Medication/supplement changes: None noted    Signs or symptoms of bleeding or clotting: No    Previous INR: Subtherapeutic    Additional findings: None     PLAN     Recommended plan for no diet, medication or health factor changes affecting INR     Dosing Instructions:  Increase your warfarin dose (7.1% change) with next INR in 1 week       Summary  As of 7/2/2021    Full warfarin instructions:  3.75 mg every Fri; 2.5 mg all other days   Next INR check:  7/9/2021             Telephone call with Haresh whom verbalizes understanding and agrees to plan    Patient to recheck with home meter    Education provided: Target INR goal and significance of current INR result    Plan made per [facility] anticoagulation protocol    Laverne Ferguson RN  Anticoagulation Clinic  7/2/2021    _______________________________________________________________________     Anticoagulation Episode Summary     Current INR goal:  2.0-3.0   TTR:  60.5 % (1 y)   Target end date:  Indefinite   Send INR reminders to:  CHELSEA CHILEL    Indications    Long-term (current) use of anticoagulants [Z79.01] [Z79.01]  Atrial fibrillation (H) [I48.91]  Antiphospholipid antibody syndrome (H) (Resolved) [D68.61]  Personal history of DVT (deep vein thrombosis) (Resolved) [Z86.718]  Atrial fibrillation  unspecified type (H) (Resolved) [I48.91]  Paroxysmal atrial fibrillation (H) [I48.0]           Comments:  JESSICA rodas to manage by exception         Anticoagulation Care Providers     Provider Role Specialty Phone number    Anya Nowak MD Referring Family Medicine  [phone]

## 2021-07-02 NOTE — PROGRESS NOTES
Incoming fax    Receieved on 07/02/21    From [facility]   INR 1.8       Macrina Avila RN, BSN, PHN

## 2021-07-03 NOTE — PROGRESS NOTES
07/01/21 1500   General Information   Start of Care Date 07/01/21   Referring Physician Ángel Hill   Orders Evaluate and Treat as Indicated   Additional Orders left foot drop   Order Date 06/10/21   Medical Diagnosis L foot drop   Surgical/Medical history reviewed Yes   Pertinent history of current problem (include personal factors and/or comorbidities that impact the POC)  has OT today before PT; reports-- don't know what i need.  met w/ SHANTELLE guallpa in [place] but am deciding about a afo.  it was june 17.     Pertinent Visual History  glasses   Prior level of functional mobility Ambulation   Current Community Support Family/friend caregiver   Patient role/Employment history Retired   Living environment [place]   Patient/Family Goals Statement walk better   General Information Comments see OT note and eval   Fall Risk Screen   Fall screen completed by PT   Have you fallen 2 or more times in the past year? No   Have you fallen and had an injury in the past year? No   Fall screen comments fell groc carrying, tripped L foot   Pain   Patient currently in pain No   Cognitive Status Examination   Orientation orientation to person, place and time   Level of Consciousness alert   Follows Commands and Answers Questions 100% of the time   Cognitive Comment see OT note   Integumentary   Integumentary No deficits were identified   Range of Motion (ROM)   ROM Comment L ankle to neutral df   Strength   Strength Comments gr 0-1 L dorsifl   Bed Mobility   Bed Mobility Comments indep   Transfer Skills   Transfer Comments indep   Locomotion   Wheel Chair Mobility Comments n/a   Gait Special Tests   Gait Special Tests 25 FOOT TIMED WALK   Gait Special Tests 25 Foot Timed Walk   Seconds 8   Steps 15 Steps   Comments L foot hard slap    Sensory Examination   Sensory Perception no deficits were identified   Sensory Perception Comments has c/o some numbness/tingling L leg   Muscle Tone   Muscle Tone other (describe)   Muscle Tone  Comments low tone L ankle   Planned Therapy Interventions   Planned Therapy Interventions balance training;gait training;orthotic fitting/training;strengthening;stretching   Clinical Impression   Criteria for Skilled Therapeutic Interventions Met yes, treatment indicated   PT Diagnosis L foot drop w/ gait   Influenced by the following impairments weakness   Functional limitations due to impairments fall risk w/ L foot drop   Clinical Presentation Stable/Uncomplicated   Clinical Decision Making (Complexity) Low complexity   Therapy Frequency other (see comments)   Predicted Duration of Therapy Intervention (days/wks) up to 4x in 90 days   Risk & Benefits of therapy have been explained Yes   Patient, Family & other staff in agreement with plan of care Yes   Clinical Impression Comments Pt known to me w/ new left foot drop, needs AFO L.     Education Assessment   Preferred Learning Style Demonstration   Barriers to Learning Cognitive   GOALS   PT Eval Goals 1;2   Goal 1   Goal Identifier gait   Goal Description pt to walk 500ft w/ new afo w/out foot drop   Target Date 09/27/21   Goal 2   Goal Identifier afo   Goal Description pt to don/doff afo indep and perf stairs x 6 w/ rail indep   Target Date 09/27/21   Total Evaluation Time   PT Bautista, Low Complexity Minutes (28416) 22

## 2021-07-05 ENCOUNTER — HOSPITAL ENCOUNTER (EMERGENCY)
Facility: CLINIC | Age: 71
Discharge: HOME | End: 2021-07-05
Attending: EMERGENCY MEDICINE | Admitting: EMERGENCY MEDICINE
Payer: COMMERCIAL

## 2021-07-05 ENCOUNTER — APPOINTMENT (OUTPATIENT)
Dept: GENERAL RADIOLOGY | Facility: CLINIC | Age: 71
End: 2021-07-05
Attending: EMERGENCY MEDICINE
Payer: COMMERCIAL

## 2021-07-05 ENCOUNTER — TELEPHONE (OUTPATIENT)
Dept: UROLOGY | Facility: CLINIC | Age: 71
End: 2021-07-05

## 2021-07-05 ENCOUNTER — DOCUMENTATION ONLY (OUTPATIENT)
Dept: EMERGENCY MEDICINE | Facility: CLINIC | Age: 71
End: 2021-07-05

## 2021-07-05 ENCOUNTER — ANCILLARY PROCEDURE (OUTPATIENT)
Dept: ULTRASOUND IMAGING | Facility: CLINIC | Age: 71
End: 2021-07-05
Attending: EMERGENCY MEDICINE
Payer: COMMERCIAL

## 2021-07-05 VITALS
BODY MASS INDEX: 19.64 KG/M2 | RESPIRATION RATE: 16 BRPM | OXYGEN SATURATION: 98 % | DIASTOLIC BLOOD PRESSURE: 83 MMHG | WEIGHT: 145 LBS | SYSTOLIC BLOOD PRESSURE: 156 MMHG | HEART RATE: 71 BPM | HEIGHT: 72 IN | TEMPERATURE: 97.6 F

## 2021-07-05 DIAGNOSIS — R33.9 URINARY RETENTION WITH INCOMPLETE BLADDER EMPTYING: ICD-10-CM

## 2021-07-05 DIAGNOSIS — R33.9 URINARY RETENTION: Primary | ICD-10-CM

## 2021-07-05 DIAGNOSIS — K59.00 CONSTIPATION, UNSPECIFIED CONSTIPATION TYPE: ICD-10-CM

## 2021-07-05 LAB
ALBUMIN SERPL-MCNC: 3.1 G/DL (ref 3.4–5)
ALBUMIN UR-MCNC: 20 MG/DL
ALP SERPL-CCNC: 102 U/L (ref 40–150)
ALT SERPL W P-5'-P-CCNC: 13 U/L (ref 0–70)
ANION GAP SERPL CALCULATED.3IONS-SCNC: 4 MMOL/L (ref 3–14)
APPEARANCE UR: CLEAR
AST SERPL W P-5'-P-CCNC: 23 U/L (ref 0–45)
BASOPHILS # BLD AUTO: 0 10E9/L (ref 0–0.2)
BASOPHILS NFR BLD AUTO: 0.5 %
BILIRUB SERPL-MCNC: 0.7 MG/DL (ref 0.2–1.3)
BILIRUB UR QL STRIP: NEGATIVE
BUN SERPL-MCNC: 25 MG/DL (ref 7–30)
CALCIUM SERPL-MCNC: 8.3 MG/DL (ref 8.5–10.1)
CHLORIDE SERPL-SCNC: 103 MMOL/L (ref 94–109)
CO2 SERPL-SCNC: 28 MMOL/L (ref 20–32)
COLOR UR AUTO: YELLOW
CREAT SERPL-MCNC: 1.15 MG/DL (ref 0.66–1.25)
DIFFERENTIAL METHOD BLD: ABNORMAL
EOSINOPHIL # BLD AUTO: 0.2 10E9/L (ref 0–0.7)
EOSINOPHIL NFR BLD AUTO: 2.3 %
ERYTHROCYTE [DISTWIDTH] IN BLOOD BY AUTOMATED COUNT: 13 % (ref 10–15)
GFR SERPL CREATININE-BSD FRML MDRD: 63 ML/MIN/{1.73_M2}
GLUCOSE SERPL-MCNC: 97 MG/DL (ref 70–99)
GLUCOSE UR STRIP-MCNC: NEGATIVE MG/DL
HCT VFR BLD AUTO: 38.9 % (ref 40–53)
HGB BLD-MCNC: 13.8 G/DL (ref 13.3–17.7)
HGB UR QL STRIP: NEGATIVE
IMM GRANULOCYTES # BLD: 0 10E9/L (ref 0–0.4)
IMM GRANULOCYTES NFR BLD: 0.1 %
INR PPP: 2.87 (ref 0.86–1.14)
KETONES UR STRIP-MCNC: NEGATIVE MG/DL
LEUKOCYTE ESTERASE UR QL STRIP: NEGATIVE
LYMPHOCYTES # BLD AUTO: 2.8 10E9/L (ref 0.8–5.3)
LYMPHOCYTES NFR BLD AUTO: 31.7 %
MCH RBC QN AUTO: 32.1 PG (ref 26.5–33)
MCHC RBC AUTO-ENTMCNC: 35.5 G/DL (ref 31.5–36.5)
MCV RBC AUTO: 91 FL (ref 78–100)
MONOCYTES # BLD AUTO: 1 10E9/L (ref 0–1.3)
MONOCYTES NFR BLD AUTO: 11.8 %
MUCOUS THREADS #/AREA URNS LPF: PRESENT /LPF
NEUTROPHILS # BLD AUTO: 4.7 10E9/L (ref 1.6–8.3)
NEUTROPHILS NFR BLD AUTO: 53.6 %
NITRATE UR QL: NEGATIVE
NRBC # BLD AUTO: 0 10*3/UL
NRBC BLD AUTO-RTO: 0 /100
PH UR STRIP: 5.5 PH (ref 5–7)
PLATELET # BLD AUTO: 136 10E9/L (ref 150–450)
POTASSIUM SERPL-SCNC: 3.3 MMOL/L (ref 3.4–5.3)
PROT SERPL-MCNC: 7.1 G/DL (ref 6.8–8.8)
RBC # BLD AUTO: 4.3 10E12/L (ref 4.4–5.9)
RBC #/AREA URNS AUTO: 17 /HPF (ref 0–2)
SODIUM SERPL-SCNC: 136 MMOL/L (ref 133–144)
SOURCE: ABNORMAL
SP GR UR STRIP: 1.02 (ref 1–1.03)
SQUAMOUS #/AREA URNS AUTO: 0 /HPF (ref 0–1)
UROBILINOGEN UR STRIP-MCNC: NORMAL MG/DL (ref 0–2)
WBC # BLD AUTO: 8.8 10E9/L (ref 4–11)
WBC #/AREA URNS AUTO: 3 /HPF (ref 0–5)

## 2021-07-05 PROCEDURE — 99285 EMERGENCY DEPT VISIT HI MDM: CPT | Mod: 25 | Performed by: EMERGENCY MEDICINE

## 2021-07-05 PROCEDURE — 96360 HYDRATION IV INFUSION INIT: CPT | Performed by: EMERGENCY MEDICINE

## 2021-07-05 PROCEDURE — 258N000003 HC RX IP 258 OP 636: Performed by: EMERGENCY MEDICINE

## 2021-07-05 PROCEDURE — 74019 RADEX ABDOMEN 2 VIEWS: CPT

## 2021-07-05 PROCEDURE — 76775 US EXAM ABDO BACK WALL LIM: CPT | Mod: 26 | Performed by: EMERGENCY MEDICINE

## 2021-07-05 PROCEDURE — 74019 RADEX ABDOMEN 2 VIEWS: CPT | Mod: 26 | Performed by: STUDENT IN AN ORGANIZED HEALTH CARE EDUCATION/TRAINING PROGRAM

## 2021-07-05 PROCEDURE — 85025 COMPLETE CBC W/AUTO DIFF WBC: CPT | Performed by: EMERGENCY MEDICINE

## 2021-07-05 PROCEDURE — 96361 HYDRATE IV INFUSION ADD-ON: CPT | Mod: 59 | Performed by: EMERGENCY MEDICINE

## 2021-07-05 PROCEDURE — 76775 US EXAM ABDO BACK WALL LIM: CPT | Performed by: EMERGENCY MEDICINE

## 2021-07-05 PROCEDURE — 81001 URINALYSIS AUTO W/SCOPE: CPT | Performed by: EMERGENCY MEDICINE

## 2021-07-05 PROCEDURE — 85610 PROTHROMBIN TIME: CPT | Performed by: EMERGENCY MEDICINE

## 2021-07-05 PROCEDURE — 51702 INSERT TEMP BLADDER CATH: CPT | Mod: 59 | Performed by: EMERGENCY MEDICINE

## 2021-07-05 PROCEDURE — 80053 COMPREHEN METABOLIC PANEL: CPT | Performed by: EMERGENCY MEDICINE

## 2021-07-05 RX ORDER — ONDANSETRON 2 MG/ML
8 INJECTION INTRAMUSCULAR; INTRAVENOUS ONCE
Status: DISCONTINUED | OUTPATIENT
Start: 2021-07-05 | End: 2021-07-05 | Stop reason: HOSPADM

## 2021-07-05 RX ADMIN — SODIUM CHLORIDE 1000 ML: 9 INJECTION, SOLUTION INTRAVENOUS at 12:08

## 2021-07-05 ASSESSMENT — MIFFLIN-ST. JEOR: SCORE: 1440.72

## 2021-07-05 NOTE — DISCHARGE INSTRUCTIONS
Instructions from your doctor today:  Emergency Department testing is focused on the potential causes of your symptoms that are the most dangerous possibilities, and cannot cover every possibility. Based on the evaluation, it was deemed sufficiently safe to discharge and continue management through the clinics. Thus, follow-up is very important to assess for improvement/worsening, potential further testing, and potential treatment adjustments. If you were given opioid pain medications or other medications that can make you drowsy while in the ED, you should not drive for at least several hours and not until you feel completely back to normal.     Please make an appointment to follow up with:  - Your Primary Care Provider and Urology Clinic (phone: [phone]) in about 5-7 days  - If you do not have a primary care provider, you can be seen in follow-up and establish care by calling any of the clinics below:     - [facility] (phone: [phone])     - Primary [facility] / [facility] (phone: [phone])   - Have your clinic provider review the results from today's visit with you again, including any potential follow-up or additional testing that may be needed based on the results. Occasionally, incidental findings are found on later review by radiologists that may need follow-up.     Return to the Emergency Department immediately if you have fever, flank (side) pain, worsening symptoms, or any other urgent or potentially life-threatening concerns.

## 2021-07-05 NOTE — TELEPHONE ENCOUNTER
[facility] Call Center    Phone Message    May a detailed message be left on voicemail: yes     Reason for Call: Appointment Intake    Referring Provider Name: Rafi Dawkins MD  Diagnosis and/or Symptoms: Urinary retention with incomplete bladder emptying      Action Taken: Message routed to:  Other: [facility]    Travel Screening: Not Applicable

## 2021-07-05 NOTE — PROGRESS NOTES
ANTICOAGULATION  MANAGEMENT: Discharge Review    Haresh Rock chart reviewed for anticoagulation continuity of care    Emergency room visit on 7/5 for Vomiting, UTI.    Discharge disposition: Home    Results:    Recent labs: (last 7 days)     07/02/21 07/05/21  1206   INR 1.8* 2.87*     Anticoagulation inpatient management:     not applicable     Anticoagulation discharge instructions:     Warfarin dosing: home regimen continued   Bridging: No   INR goal change: No      Medication changes affecting anticoagulation: No    Additional factors affecting anticoagulation: Yes: Constipation    Plan     No adjustment to anticoagulation plan needed    Patient not contacted    Anticoagulation Calendar updated    Allyn Doe, RN

## 2021-07-05 NOTE — ED PROVIDER NOTES
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  Patient reports constipation has been an ongoing issue, has been taking senna.  2.5 days ago he had especially strong urge to defecate with abdominal colicky pain associated.  He was unable to get stool out at that time without an enema. He states that the water from the enema came out immediately. He did have some very small \"slimy\" BMs afterward.  No bloody or black BMs, no vomiting, no fever. Patient hasn't had further abdominal pain. Patient also endorses some difficulty with urination past several days.  He notes urinary frequency, getting up to urinate every 20 minutes or so.  Also mild dysuria.  No flank pain.  Patient has been wearing an adult diaper the last couple days due to how frequent the urination is and difficulty with getting some out when he feels like he needs to go.     This part of the medical record was transcribed by [employer] Medical Scribe.         Past Medical History  Past Medical History:   Diagnosis Date     Abnormal dopamine scan (DaTSCAN) 2020 11/04/2020    [phone] 11/3/2020   Narrative & Impression   Examination: Nuclear medicine DATscan for Dopamine Receptor Localization.   Examination: NM Brain Image Tomographic (SPECT) DATscan   Date: 11/3/2020 3:21 PM   Indication: Parkinsonism   Comparison: None   Additional Information: none   Interfering Medications: None   Technique:   The patient initially received 1 ml of Lugol's solution orally prior     Antiphospholipid antibody syndrome (H) 2005    Ravi's     Antiphospholipid antibody syndrome (H)     Ravi's, noted negative per testing re-done in 2008 in hematology note 01/13/2009     Articulation disorder 09/23/2020     Atrial " fibrillation (H) 04/2011     Cirrhosis (H)      CKD (chronic kidney disease) stage 2, GFR 60-89 ml/min      Diabetes mellitus type II     steroid induced     DJD (degenerative joint disease) of knee     SHAWN, worse on right     DVT (deep venous thrombosis) (H)      ED (erectile dysfunction)      Gallbladder polyp 05/2010     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     Hemochromatosis      Hodgkin's disease NOS     5 cycles of ABVD in 2011     HTN (hypertension)      Hyperlipidaemia LDL goal <100      Multiple thyroid nodules     benign      Myeloproliferative disorder (H)     atypical, [facility]     Parkinson disease (H) 09/24/2020     PE (pulmonary embolism) 11/2004     Polyneuropathy      Primary pulmonary hypertension (H)      Thrombocytopenia (H) 6/8/2021     Past Surgical History:   Procedure Laterality Date     ANESTHESIA CARDIOVERSION  4/21/2011    Procedure:ANESTHESIA CARDIOVERSION; Surgeon:GENERIC ANESTHESIA PROVIDER; Location:[facility] OR     ARTHROSCOPY KNEE RT/LT      LT     CATARACT IOL, RT/LT  2017     COLONOSCOPY  10/16/2012    Procedure: COLONOSCOPY;  Colonoscopy, screening;  Surgeon: Reg Feliciano MD;  Location: [facility] OR     COLONOSCOPY N/A 4/14/2021    Procedure: COLONOSCOPY;  Surgeon: Reg Holm MD;  Location: [facility] GI     ESOPHAGOSCOPY, GASTROSCOPY, DUODENOSCOPY (EGD), COMBINED N/A 10/7/2020    Procedure: ESOPHAGOGASTRODUODENOSCOPY, WITH BIOPSY;  Surgeon: Raul Sawyer DO;  Location: [facility] OR     H ABLATION FOCAL AFIB  3/5/2013    PVI     H ABLATION FOCAL AFIB  01/01/2018     HC BONE MARROW/STEM TRANSPLANT ALLOGENIC  5/8/2007     INSERT PORT VASCULAR ACCESS       JOINT REPLACEMTN, KNEE RT/LT  3/28/12    SHAWN     VASECTOMY  1990     docusate sodium (COLACE) 50 MG capsule  acetaminophen (TYLENOL) 500 MG tablet  amoxicillin (AMOXIL) 500 MG capsule  bisacodyl (DULCOLAX) 5 MG EC tablet  calcium carbonate (TUMS) 500 MG chewable tablet  flecainide (TAMBOCOR) 50 MG tablet  fluticasone (FLONASE) 50 MCG/ACT  nasal spray  loratadine (CLARITIN) 10 MG tablet  metoprolol tartrate (LOPRESSOR) 25 MG tablet  multivitamin (CENTRUM SILVER) tablet  Nutritional Supplements (ENSURE PO)  polyethylene glycol (MIRALAX) 17 g packet  PREVIDENT 5000 DRY MOUTH 1.1 % GEL topical gel  senna-docusate (SENOKOT-S/PERICOLACE) 8.6-50 MG tablet  simvastatin (ZOCOR) 10 MG tablet  tamsulosin (FLOMAX) 0.4 MG capsule  vitamin D3 (CHOLECALCIFEROL) 50 mcg (2000 units) tablet  warfarin ANTICOAGULANT (COUMADIN) 2.5 MG tablet      Allergies   Allergen Reactions     Seasonal Allergies      Family History  Family History   Problem Relation Age of Onset     Hypertension Mother      Cerebrovascular Disease Mother      Breast Cancer Mother      Arrhythmia Brother      Arrhythmia Sister         supraventricular tachycardia     Thyroid Disease Sister      Other - See Comments Son         lives in [place]. no kids and not .     Obsessive Compulsive Disorder Son      Depression Son      Eating Disorder Son         eats when depressed     Obesity Son      Thyroid Cancer Daughter         had thyroid removed     Bipolar Disorder Daughter      Other - See Comments Daughter         lives in [place] - single with one son 9  yrs     Other - See Comments Sister      Alzheimer Disease Father      Diabetes Paternal Aunt      Gastrointestinal Disease Maternal Grandmother         colitis      Parkinsonism Other         2 cousins with parkinson     C.A.D. No family hx of      Social History   Social History     Tobacco Use     Smoking status: Never Smoker     Smokeless tobacco: Never Used   Substance Use Topics     Alcohol use: No     Drug use: No      Past medical history, past surgical history, medications, allergies, family history, and social history were reviewed with the patient. No additional pertinent items.      Review of Systems  A complete review of systems was performed with pertinent positives and negatives noted in the HPI, and all other  systems negative.    Physical Exam   BP: 117/69  Pulse: 77  Temp: 97.6  F (36.4  C)  Resp: 16  Height: 182.9 cm (6')  Weight: 65.8 kg (145 lb)  SpO2: 96 %    Physical Exam  General: no acute distress. Appears stated age.   HENT: MMM, no oropharyngeal lesions  Eyes: PERRL, normal sclerae  Cardio: regular rate. Regular rhythm. Extremities well perfused  Resp: Normal work of breathing, normal respiratory rate.  Chest/Back: no visual signs of trauma, no CVA tenderness  Abdomen: suprapubic only tenderness, non-distended, no rebound, no guarding  Neuro: alert and fully oriented. CN II-XII grossly intact. Grossly normal strength and sensation in all extremities.   MSK: no deformities. Grossly normal ROM in extremities.   Integumentary/Skin: no rash visualized, normal color  Psych: normal affect, normal behavior    ED Course      Procedures  Results for orders placed during the hospital encounter of 07/05/21   POC US RETROPERITONEAL LIMITED    Impression Limited Bedside ED Renal & Bladder Ultrasound:  PERFORMED BY: Dr. Rafi Dawkins MD  INDICATIONS:  Inability to void  PROBE: Low frequency convex probe  BODY LOCATION:  Abdomen (retroperitoneum and bladder)  FINDINGS:  The ultrasound was performed with longitudinal and transverse views of the kidneys and bladder.   Right Kidney: No hydronephrosis. No visualized cysts.    Left Kidney: No hydronephrosis. No visualized cysts.   Bladder: 10 minutes post-void, bladder containing with 636 ml.   INTERPRETATION:  Post-void bladder size consistent with urinary retention. No hydronephrosis. No visualized renal cysts.   IMAGE DOCUMENTATION: Images were archived to PACs system.             Labs Ordered and Resulted from Time of ED Arrival Up to the Time of Departure from the ED   CBC WITH PLATELETS DIFFERENTIAL - Abnormal; Notable for the following components:       Result Value    RBC Count 4.30 (*)     Hematocrit 38.9 (*)     Platelet Count 136 (*)     All other components within  normal limits   COMPREHENSIVE METABOLIC PANEL - Abnormal; Notable for the following components:    Potassium 3.3 (*)     Calcium 8.3 (*)     Albumin 3.1 (*)     All other components within normal limits   ROUTINE UA WITH MICROSCOPIC REFLEX TO CULTURE - Abnormal; Notable for the following components:    Protein Albumin Urine 20 (*)     RBC Urine 17 (*)     Mucous Urine Present (*)     All other components within normal limits   INR - Abnormal; Notable for the following components:    INR 2.87 (*)     All other components within normal limits   PERIPHERAL IV CATHETER     Abdomen XR, 2 vw, flat and upright   Final Result   IMPRESSION: Nonobstructive bowel gas pattern.      I have personally reviewed the examination and initial interpretation   and I agree with the findings.      RASTA LANGE MD             SYSTEM ID:  [id number]      POC US RETROPERITONEAL LIMITED   Final Result   Abnormal   Limited Bedside ED Renal & Bladder Ultrasound:   PERFORMED BY: Dr. Rafi Dawkins MD   INDICATIONS:  Inability to void   PROBE: Low frequency convex probe   BODY LOCATION:  Abdomen (retroperitoneum and bladder)   FINDINGS:  The ultrasound was performed with longitudinal and transverse views of the kidneys and bladder.    Right Kidney: No hydronephrosis. No visualized cysts.     Left Kidney: No hydronephrosis. No visualized cysts.    Bladder: 10 minutes post-void, bladder containing with 636 ml.    INTERPRETATION:  Post-void bladder size consistent with urinary retention. No hydronephrosis. No visualized renal cysts.    IMAGE DOCUMENTATION: Images were archived to PACs system.                Assessments & Plan (with Medical Decision Making)   Patient presenting with urinary frequency, mild dysuria, constipation in the context of Parkinson's disease and history of myeloproliferative disorder.  Vitals in ED unremarkable.  Nursing notes reviewed.  On exam, only mild suprapubic tenderness without other abdominal tenderness. Initial  differential diagnosis includes but not limited to constipation, bowel obstruction, UTI, neurogenic bladder, urinary retention with overflow.     Abdominal x-ray demonstrates no evidence of obstruction, mild stool burden.  UA withoutevidence of UTI.  Post void renal/bladder ultrasound demonstrates 636 ml.  Serum labs notable for therapeutic INR, no leukocytosis, normal Cr, unremarkable electrolytes with mildly low K and Ca.     This part of the medical record was transcribed by Christen Grayson.     Dill placed by nursing without complication.     The complete clinical picture is most consistent with urinary retention and mild constipation. After counseling on the diagnosis, work-up, and treatment plan, the patient was discharged to home. The patient was advised to follow-up with urology in 1 week. The patient was advised to return to the ED if worsening symptoms, fever, or if there are any urgent/life-threatening concerns.     Final diagnoses:   Urinary retention with incomplete bladder emptying   Constipation, unspecified constipation type     Discharge Medication List as of 7/5/2021  3:23 PM      START taking these medications    Details   docusate sodium (COLACE) 50 MG capsule Take 1 capsule (50 mg) by mouth 2 times daily as needed for constipation, Disp-30 capsule, R-0, Local Print             --  Rafi Dawkins MD   Emergency Medicine   [facility] EMERGENCY DEPARTMENT  7/5/2021     Rafi Dawkins MD  07/06/21 [id number]

## 2021-07-05 NOTE — ED TRIAGE NOTES
Triage Assessment & Note:    /69   Pulse 77   Temp 97.6  F (36.4  C) (Oral)   Resp 16   SpO2 96%     Patient presents with: PT reports he pain with voiding and nausea and vomiting since Friday.     Home Treatments/Remedies: None    Febrile / Afebrile? Afebrile     Duration of C/o: 4 days     Hao Nieto RN  July 5, 2021

## 2021-07-06 RX ORDER — TAMSULOSIN HYDROCHLORIDE 0.4 MG/1
0.4 CAPSULE ORAL DAILY
Qty: 30 CAPSULE | Refills: 0 | Status: SHIPPED | OUTPATIENT
Start: 2021-07-06 | End: 2021-07-26

## 2021-07-06 NOTE — TELEPHONE ENCOUNTER
Per Dr Yazmin villafuerte for patient to come next week.   Appointment scheduled.   Michelle Reyes RN

## 2021-07-06 NOTE — TELEPHONE ENCOUNTER
Pt is calling to schedule a hospital follow up, urinary retention, roberson placed in the ED, pt would prefer [facility] ,please call Haresh to schedule, thank you

## 2021-07-09 ENCOUNTER — TRANSFERRED RECORDS (OUTPATIENT)
Dept: HEALTH INFORMATION MANAGEMENT | Facility: CLINIC | Age: 71
End: 2021-07-09

## 2021-07-09 ENCOUNTER — ANTICOAGULATION THERAPY VISIT (OUTPATIENT)
Dept: FAMILY MEDICINE | Facility: CLINIC | Age: 71
End: 2021-07-09

## 2021-07-09 DIAGNOSIS — Z79.01 LONG TERM CURRENT USE OF ANTICOAGULANT THERAPY: ICD-10-CM

## 2021-07-09 DIAGNOSIS — I48.0 PAROXYSMAL ATRIAL FIBRILLATION (H): ICD-10-CM

## 2021-07-09 LAB — INR PPP: 2.7 (ref 0.9–1.1)

## 2021-07-09 NOTE — PROGRESS NOTES
ANTICOAGULATION  MANAGEMENT-Patient Home Monitoring Result    Assessment     Therapeutic INR result of 2.7 . Goal range 2.0-3.0. Received via fax from [employer] INR monitoring [employer].        Previous INR was therapeutic    Haresh was last contacted by phone:     Plan     Per home monitoring agreement with patient, patient was NOT contacted regarding therapeutic result today.  Patient is to continue current dose and continue to check INR with home monitor per protocol.  ?       OBJECTIVE    INR   Date Value Ref Range Status   2021 2.7 (A) 0.90 - 1.10 Final     Factor 2 Assay   Date Value Ref Range Status   2007 58 (L) 60 - 140 % Final     Comment:     (Note)  CALLED TO TAMARA(INTERV. RADIOLOGY)[id number],        ASSESSMENT / PLAN  No question data found.  Anticoagulation Summary  As of 2021    INR goal:  2.0-3.0   TTR:  60.3 % (1 y)   INR used for dosin.7 (2021)   Warfarin maintenance plan:  3.75 mg (2.5 mg x 1.5) every Fri; 2.5 mg (2.5 mg x 1) all other days   Full warfarin instructions:  3.75 mg every Fri; 2.5 mg all other days   Weekly warfarin total:  18.75 mg   Plan last modified:  Laverne Ferguson, RN (2021)   Next INR check:     Priority:  Maintenance   Target end date:  Indefinite    Indications    Long-term (current) use of anticoagulants [Z79.01] [Z79.01]  Atrial fibrillation (H) [I48.91]  Antiphospholipid antibody syndrome (H) (Resolved) [D68.61]  Personal history of DVT (deep vein thrombosis) (Resolved) [Z86.718]  Atrial fibrillation  unspecified type (H) (Resolved) [I48.91]  Paroxysmal atrial fibrillation (H) [I48.0]             Anticoagulation Episode Summary     INR check location:      Preferred lab:  EXTERNAL LAB    Send INR reminders to:  CHELSEA CHILEL    Comments:  JESSICA rodas to manage by exception      Anticoagulation Care Providers     Provider Role Specialty Phone number    Anya Nowak MD Referring Family Medicine [phone]          Macrina  Austin, RN, BSN, PHN  Anticoagulation Nurse  [phone]

## 2021-07-12 NOTE — TELEPHONE ENCOUNTER
RECORDS RECEIVED FROM: Internal   DATE RECEIVED: 08.17.2021   NOTES (Gather within 2 years) STATUS DETAILS   OFFICE NOTE from referring provider   Internal 06.24.2021 Anya Nowak MD   OFFICE NOTE from other specialist Internal  Rosalva Samuels MD   DISCHARGE SUMMARY from hospital N/A    DISCHARGE REPORT from the ER N/A    LABS (any labs) Internal    MEDICATION LIST Internal    IMAGING  (NEED IMAGES AND REPORTS)     Osteomyelitis: Foot imaging  N/A    Liver Abscess: Abdominal imaging N/A    Other (anything related to diagnoses N/A

## 2021-07-13 ENCOUNTER — OFFICE VISIT (OUTPATIENT)
Dept: UROLOGY | Facility: CLINIC | Age: 71
End: 2021-07-13
Payer: COMMERCIAL

## 2021-07-13 VITALS — SYSTOLIC BLOOD PRESSURE: 114 MMHG | DIASTOLIC BLOOD PRESSURE: 72 MMHG | HEART RATE: 70 BPM

## 2021-07-13 DIAGNOSIS — R33.8 BENIGN PROSTATIC HYPERPLASIA WITH URINARY RETENTION: Primary | ICD-10-CM

## 2021-07-13 DIAGNOSIS — N40.1 BENIGN PROSTATIC HYPERPLASIA WITH URINARY RETENTION: Primary | ICD-10-CM

## 2021-07-13 DIAGNOSIS — R33.9 URINARY RETENTION: ICD-10-CM

## 2021-07-13 PROCEDURE — 99204 OFFICE O/P NEW MOD 45 MIN: CPT | Mod: 25 | Performed by: UROLOGY

## 2021-07-13 PROCEDURE — 51702 INSERT TEMP BLADDER CATH: CPT | Performed by: UROLOGY

## 2021-07-13 RX ORDER — DUTASTERIDE 0.5 MG/1
0.5 CAPSULE, LIQUID FILLED ORAL DAILY
Qty: 30 CAPSULE | Refills: 1 | Status: SHIPPED | OUTPATIENT
Start: 2021-07-13 | End: 2021-07-26

## 2021-07-13 NOTE — PROGRESS NOTES
S: Haresh Rock is a pleasant  71 year old male who was requested to be seen by  Anya Nowak for a consult with regard to patient's urinary complaints.  Patient complains of urinary retention.  He was seen in the ER recently with urinary problems.  Dill catheter was placed for urinary retention.  He had both obstructive and irritative voiding symptoms prior.  He has no history of elevated PSA.  Symptoms have been on going for   many years(s).  Seems to be worsened over time.  His recent PSA was found to be   PSA   Date Value Ref Range Status   02/16/2021 2.29 0 - 4 ug/L Final     Comment:     Assay Method:  Chemiluminescence using Siemens Vista analyzer   He was placed on flomax recently.    Current Outpatient Medications   Medication Sig Dispense Refill     acetaminophen (TYLENOL) 500 MG tablet 500mg tab as needed       amoxicillin (AMOXIL) 500 MG capsule 4 x 500mg tabs by mouth 1 hour before dental appointments.       bisacodyl (DULCOLAX) 5 MG EC tablet Refer to instructions sent via [employer]. 4 tablet 0     calcium carbonate (TUMS) 500 MG chewable tablet 1 tab by mouth in morning or at night as needed       docusate sodium (COLACE) 50 MG capsule Take 1 capsule (50 mg) by mouth 2 times daily as needed for constipation 30 capsule 0     dutasteride (AVODART) 0.5 MG capsule Take 1 capsule (0.5 mg) by mouth daily 30 capsule 1     flecainide (TAMBOCOR) 50 MG tablet Take 1 tablet (50 mg) by mouth 2 times daily 180 tablet 2     fluticasone (FLONASE) 50 MCG/ACT nasal spray Spray 1 spray into both nostrils daily as needed for rhinitis 16 g 11     loratadine (CLARITIN) 10 MG tablet Take 10 mg by mouth daily as needed        metoprolol tartrate (LOPRESSOR) 25 MG tablet Take 0.5 tablets (12.5 mg) by mouth 2 times daily TAKE 1 TABLET BY MOUTH TWICE A DAY 90 tablet 3     multivitamin (CENTRUM SILVER) tablet Take 1 tablet by mouth daily       Nutritional Supplements (ENSURE PO)        polyethylene glycol (MIRALAX)  17 g packet Take 1 packet by mouth daily as needed for constipation       PREVIDENT 5000 DRY MOUTH 1.1 % GEL topical gel Take by mouth daily  3     senna-docusate (SENOKOT-S/PERICOLACE) 8.6-50 MG tablet Take 2 tablets by mouth daily       simvastatin (ZOCOR) 10 MG tablet Take 1 tablet (10 mg) by mouth daily 90 tablet 3     tamsulosin (FLOMAX) 0.4 MG capsule Take 1 capsule (0.4 mg) by mouth daily 30 capsule 0     vitamin D3 (CHOLECALCIFEROL) 50 mcg (2000 units) tablet Take 1 tablet (50 mcg) by mouth daily       warfarin ANTICOAGULANT (COUMADIN) 2.5 MG tablet TAKE 1.25 MG (1/2 TABLET) MON/FRI AND 2.5 MG (1 TABLET) ALL OTHER DAYS. NEED APPT. 90 tablet 0     Allergies   Allergen Reactions     Seasonal Allergies      Past Medical History:   Diagnosis Date     Abnormal dopamine scan (DaTSCAN) 2020 11/04/2020    [phone] 11/3/2020   Narrative & Impression   Examination: Nuclear medicine DATscan for Dopamine Receptor Localization.   Examination: NM Brain Image Tomographic (SPECT) DATscan   Date: 11/3/2020 3:21 PM   Indication: Parkinsonism   Comparison: None   Additional Information: none   Interfering Medications: None   Technique:   The patient initially received 1 ml of Lugol's solution orally prior     Antiphospholipid antibody syndrome (H) 2005    Ravi's     Antiphospholipid antibody syndrome (H)     Ravi's, noted negative per testing re-done in 2008 in hematology note 01/13/2009     Articulation disorder 09/23/2020     Atrial fibrillation (H) 04/2011     Cirrhosis (H)      CKD (chronic kidney disease) stage 2, GFR 60-89 ml/min      Diabetes mellitus type II     steroid induced     DJD (degenerative joint disease) of knee     SHAWN, worse on right     DVT (deep venous thrombosis) (H)      ED (erectile dysfunction)      Gallbladder polyp 05/2010     Axqks-Dmbkkh-Bfka Disease 2007    BMT     Hemochromatosis      Hodgkin's disease NOS     5 cycles of ABVD in 2011     HTN (hypertension)      Hyperlipidaemia LDL goal <100       Multiple thyroid nodules     benign      Myeloproliferative disorder (H)     atypical, [facility]     Parkinson disease (H) 09/24/2020     PE (pulmonary embolism) 11/2004     Polyneuropathy      Primary pulmonary hypertension (H)      Thrombocytopenia (H) 6/8/2021     Past Surgical History:   Procedure Laterality Date     ANESTHESIA CARDIOVERSION  4/21/2011    Procedure:ANESTHESIA CARDIOVERSION; Surgeon:GENERIC ANESTHESIA PROVIDER; Location:[facility]     ARTHROSCOPY KNEE RT/LT      LT     CATARACT IOL, RT/LT  2017     COLONOSCOPY  10/16/2012    Procedure: COLONOSCOPY;  Colonoscopy, screening;  Surgeon: Reg Feliciano MD;  Location: [facility]     COLONOSCOPY N/A 4/14/2021    Procedure: COLONOSCOPY;  Surgeon: Reg Holm MD;  Location: [facility] GI     ESOPHAGOSCOPY, GASTROSCOPY, DUODENOSCOPY (EGD), COMBINED N/A 10/7/2020    Procedure: ESOPHAGOGASTRODUODENOSCOPY, WITH BIOPSY;  Surgeon: Raul Sawyer DO;  Location: [facility] OR     H ABLATION FOCAL AFIB  3/5/2013    PVI     H ABLATION FOCAL AFIB  01/01/2018     HC BONE MARROW/STEM TRANSPLANT ALLOGENIC  5/8/2007     INSERT PORT VASCULAR ACCESS       JOINT REPLACEMTN, KNEE RT/LT  3/28/12    SHAWN     VASECTOMY  1990      Family History   Problem Relation Age of Onset     Hypertension Mother      Cerebrovascular Disease Mother      Breast Cancer Mother      Arrhythmia Brother      Arrhythmia Sister         supraventricular tachycardia     Thyroid Disease Sister      Other - See Comments Son         lives in [place]. no kids and not .     Obsessive Compulsive Disorder Son      Depression Son      Eating Disorder Son         eats when depressed     Obesity Son      Thyroid Cancer Daughter         had thyroid removed     Bipolar Disorder Daughter      Other - See Comments Daughter         lives in [place] - single with one son 9  yrs     Other - See Comments Sister      Alzheimer Disease Father      Diabetes Paternal Aunt      Gastrointestinal Disease Maternal  Grandmother         colitis      Parkinsonism Other         2 cousins with parkinson     C.A.D. No family hx of      He does not have a family history of prostate cancer.  Social History     Socioeconomic History     Marital status:      Spouse name: Angelica     Number of children: 2     Years of education: 21     Highest education level: None   Occupational History     Occupation:      Employer: [employer]     Employer: [employer]   Tobacco Use     Smoking status: Never Smoker     Smokeless tobacco: Never Used   Substance and Sexual Activity     Alcohol use: No     Drug use: No     Sexual activity: Not Currently     Partners: Female   Other Topics Concern     Parent/sibling w/ CABG, MI or angioplasty before 65F 55M? No   Social History Narrative     about 50 yrs        Wife has some health issues - back pain - second knee replaced    She had gastric bypass and a fib and bad mitral valve        Son in [place]    Daughter in [place] with 8 yo son.         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[employer]        Born [place]              Social Determinants of Health     Financial Resource Strain:      Difficulty of Paying Living Expenses:    Food Insecurity:      Worried About Running Out of Food in the Last Year:      Ran Out of Food in the Last Year:    Transportation Needs:      Lack of Transportation (Medical):      Lack of Transportation (Non-Medical):    Physical Activity:      Days of Exercise per Week:      Minutes of Exercise per Session:    Stress:      Feeling of Stress :    Social Connections:      Frequency of Communication with Friends and Family:      Frequency of Social Gatherings with Friends and Family:      Attends Congregation Services:      Active Member of Clubs or Organizations:      Attends Club or Organization Meetings:      Marital Status:    Intimate Partner Violence:      Fear of Current or Ex-Partner:      Emotionally Abused:       Physically Abused:      Sexually Abused:         REVIEW OF SYSTEMS  =================  C: NEGATIVE for fever, chills, change in weight  I: NEGATIVE for worrisome rashes, moles or lesions  E/M: NEGATIVE for ear, mouth and throat problems  R: NEGATIVE for significant cough or SHORTNESS OF BREATH  CV:  NEGATIVE for chest pain, palpitations or peripheral edema  GI: NEGATIVE for nausea, abdominal pain, heartburn, or change in bowel habits  NEURO: NEGATIVE numbness/weakness  : see HPI  PSYCH: NEGATIVE depression/anxiety  LYmph: no new enlarged lymph nodes  Ortho: no new trauma/movements           O: Exam:/72   Pulse 70    Constitutional: healthy, alert and no distress  Cardiovascular: negative, PMI normal.   Respiratory: negative, no evidence of respiratory distress  Gastrointestinal: Abdomen soft, non-tender. BS normal. No masses, organomegaly  : penis no discharge. Testis no masses.  No scrotal skin lesion.  Prostate 50 gm smooth.  Musculoskeletal: extremities normal- no gross deformities noted, gait normal and normal muscle tone  Skin: no suspicious lesions or rashes  Neurologic: Alert and oriented  Psychiatric: mentation appears normal. and affect normal/bright  Hematologic/Lymphatic/Immunologic: normal ant/post cervical, axillary, supraclavicular and inguinal nodes    Assessment/Plan:   (N40.1,  R33.8) Benign prostatic hyperplasia with urinary retention  (primary encounter diagnosis)  Comment:    Plan: Continue with Flomax.  Add Avodart.  Return to clinic in several weeks for reexamination with cystoscopy and repeat trial void.    (R33.9) Urinary retention  Comment: Trial void performed today.  Wateer was placed into the bladder.  Patient was unable to void.  Plan: Patient failed his trial void.New Dill placed.

## 2021-07-16 ENCOUNTER — ANTICOAGULATION THERAPY VISIT (OUTPATIENT)
Dept: FAMILY MEDICINE | Facility: CLINIC | Age: 71
End: 2021-07-16

## 2021-07-16 ENCOUNTER — TRANSFERRED RECORDS (OUTPATIENT)
Dept: HEALTH INFORMATION MANAGEMENT | Facility: CLINIC | Age: 71
End: 2021-07-16

## 2021-07-16 DIAGNOSIS — Z79.01 LONG TERM CURRENT USE OF ANTICOAGULANT THERAPY: Primary | ICD-10-CM

## 2021-07-16 DIAGNOSIS — I48.0 PAROXYSMAL ATRIAL FIBRILLATION (H): ICD-10-CM

## 2021-07-16 LAB — INR PPP: 1.9 (ref 2–3)

## 2021-07-16 NOTE — PROGRESS NOTES
"  ANTICOAGULATION  MANAGEMENT-Patient Home Monitoring Result    Assessment     Therapeutic INR result of 1.9 . Goal range 2.0-3.0. Received via fax from [employer]  home INR monitoring company.        Previous INR was therapeutic    Haresh was last contacted by phone:     Plan     Per home monitoring agreement with patient, patient was contacted regarding subtherapeutic result today.  New medication, dutasteride (AVODART) 0.5 MG capsule started on  per Urology(see notes in chart) due to continued urinary retention. New Dill catheter placed on .   No medication interaction per \"up to date\".  Patient is to continue current dose and continue to check INR with home monitor per protocol.  ?       OBJECTIVE    INR   Date Value Ref Range Status   2021 1.9  Final     Factor 2 Assay   Date Value Ref Range Status   2007 58 (L) 60 - 140 % Final     Comment:     (Note)  CALLED TO TAMARA(INTERV. RADIOLOGY)[id number],        ASSESSMENT / PLAN  No question data found.  Anticoagulation Summary  As of 2021    INR goal:  2.0-3.0   TTR:  60.0 % (1 y)   INR used for dosin.9 (2021)   Warfarin maintenance plan:  3.75 mg (2.5 mg x 1.5) every Fri; 2.5 mg (2.5 mg x 1) all other days   Full warfarin instructions:  3.75 mg every Fri; 2.5 mg all other days   Weekly warfarin total:  18.75 mg   No change documented:  Belinda Lfaleur RN   Plan last modified:  Laverne Ferguson RN (2021)   Next INR check:  2021   Priority:  Maintenance   Target end date:  Indefinite    Indications    Long-term (current) use of anticoagulants [Z79.01] [Z79.01]  Atrial fibrillation (H) [I48.91]  Antiphospholipid antibody syndrome (H) (Resolved) [D68.61]  Personal history of DVT (deep vein thrombosis) (Resolved) [Z86.718]  Atrial fibrillation  unspecified type (H) (Resolved) [I48.91]  Paroxysmal atrial fibrillation (H) [I48.0]             Anticoagulation Episode Summary     INR check location:      Preferred lab:  EXTERNAL LAB " "   Send INR reminders to:  CHELSEA CHILEL    Comments:  JESSICA okay to manage by exception      Anticoagulation Care Providers     Provider Role Specialty Phone number    SheAnya MD Referring Family Medicine [phone]        Patient reports starting new medication dutasteride (AVODART) 0.5 MG capsule on 7/13 and previously on Tamsulosin due to urinary retention-new catheter placed on 7/13. See Urology MD plan below:    \"Assessment/Plan:   (N40.1,  R33.8) Benign prostatic hyperplasia with urinary retention  (primary encounter diagnosis)      Plan 7/13: \"Continue with Flomax.  Add Avodart.  Return to clinic in several weeks for reexamination with cystoscopy and repeat trial void.     (R33.9) Urinary retention  Comment: Trial void performed today.  Wateer was placed into the bladder.  Patient was unable to void.  Plan: Patient failed his trial void.New Dill placed.\"      Per up to date there are no interactions between Warfarin, tamsulosin and dutasteride    Patient INR 1.9 today.     Please advise.     Thanks! Belinda Lafleur RN    "

## 2021-07-16 NOTE — PROGRESS NOTES
Incoming fax from [employer] home monitor company    Date of INR 7/16    INR result 1.9    Thanks! Belinda Lafleur RN

## 2021-07-19 ENCOUNTER — OFFICE VISIT (OUTPATIENT)
Dept: CARDIOLOGY | Facility: CLINIC | Age: 71
End: 2021-07-19
Payer: COMMERCIAL

## 2021-07-19 VITALS
SYSTOLIC BLOOD PRESSURE: 104 MMHG | HEART RATE: 72 BPM | OXYGEN SATURATION: 98 % | WEIGHT: 150 LBS | DIASTOLIC BLOOD PRESSURE: 64 MMHG | BODY MASS INDEX: 20.34 KG/M2

## 2021-07-19 DIAGNOSIS — I48.0 PAROXYSMAL ATRIAL FIBRILLATION (H): Primary | ICD-10-CM

## 2021-07-19 DIAGNOSIS — I47.19 ATRIAL TACHYCARDIA (H): ICD-10-CM

## 2021-07-19 PROCEDURE — 99214 OFFICE O/P EST MOD 30 MIN: CPT | Mod: GC | Performed by: INTERNAL MEDICINE

## 2021-07-19 RX ORDER — METOPROLOL SUCCINATE 25 MG/1
25 TABLET, EXTENDED RELEASE ORAL DAILY
Qty: 90 TABLET | Refills: 3 | Status: SHIPPED | OUTPATIENT
Start: 2021-07-19 | End: 2021-11-10 | Stop reason: ALTCHOICE

## 2021-07-19 NOTE — PROGRESS NOTES
HPI: The patient is a 71-year-old gentleman with a history of paroxysmal atrial fibrillation and atrial tachycardia.  He is status post circumferential pulmonary vein isolation for paroxysmal atrial fibrillation in 03/2013, which provided him with an arrhythmia period lasting for about 5 years. Towards the end of 2017, he had recurrent atrial fibrillation and underwent re-isolation of pulmonary veins in 01/2018.  During that ablation, the patient was also found to have a right atrial tachycardia for which he underwent an ablation.  Of note, during that ablation an atypical atrial flutter was also noted which kept degenerating to atrial fibrillation and was not mappable.In September 2018, his sotalol dose of 40 mg twice daily was increased to 80 mg twice daily. Despite the increase, a Zio Patch done in September and 10/2018 showed 55 episodes of atrial tachycardia, the longest one lasting for several hours. The patient was not symptomatic for all of the episodes. At his last appointment in October 2018, sotalol was discontinued, and patient was started on metoprolol and then flecainide was initiated. In October 2018, exercise stress test on flecainide did not show evidence of VT and no significant QRS widening (sub-optimal test with 77% of the age predicted maximal heart rate achieved).    Interval history:  In review of chart, called into clinic about 2 months ago with reports of increased issues with orthostasis and low baseline BPs. Decreased his metoprolol to 12.5mg bid which he reports improved his symptoms some. States today he still has some issues with orthostasis but not as severe. States that his baseline BPs seem to run in the 110s to 120s SBP. States he does have some GAY, but is able to be pretty active and was out mowing his lawn a few weeks ago.   He notes he was recently diagnosed with Parkinson's disease and has had some issues with urinary retention recently.   States he has had one fall in the last  year. Does have foot drop so he does catch his foot from time to time.  Notes some decreased appetite and weight loss. States just doesn't feel much desire to eat. Does have evidence of aspiration seen on his prior evaluations.     PAST MEDICAL HISTORY:  Past Medical History:   Diagnosis Date     Abnormal dopamine scan (DaTSCAN) 2020 11/04/2020    [phone] 11/3/2020   Narrative & Impression   Examination: Nuclear medicine DATscan for Dopamine Receptor Localization.   Examination: NM Brain Image Tomographic (SPECT) DATscan   Date: 11/3/2020 3:21 PM   Indication: Parkinsonism   Comparison: None   Additional Information: none   Interfering Medications: None   Technique:   The patient initially received 1 ml of Lugol's solution orally prior     Antiphospholipid antibody syndrome (H) 2005    Ravi's     Antiphospholipid antibody syndrome (H)     Ravi's, noted negative per testing re-done in 2008 in hematology note 01/13/2009     Articulation disorder 09/23/2020     Atrial fibrillation (H) 04/2011     Cirrhosis (H)      CKD (chronic kidney disease) stage 2, GFR 60-89 ml/min      Diabetes mellitus type II     steroid induced     DJD (degenerative joint disease) of knee     SHAWN, worse on right     DVT (deep venous thrombosis) (H)      ED (erectile dysfunction)      Gallbladder polyp 05/2010     Pnlnl-Vlmldl-Ewkq Disease 2007    BMT     Hemochromatosis      Hodgkin's disease NOS     5 cycles of ABVD in 2011     HTN (hypertension)      Hyperlipidaemia LDL goal <100      Multiple thyroid nodules     benign      Myeloproliferative disorder (H)     atypical, [facility]     Parkinson disease (H) 09/24/2020     PE (pulmonary embolism) 11/2004     Polyneuropathy      Primary pulmonary hypertension (H)      Thrombocytopenia (H) 6/8/2021       CURRENT MEDICATIONS:  Current Outpatient Medications   Medication Sig Dispense Refill     acetaminophen (TYLENOL) 500 MG tablet 500mg tab as needed       bisacodyl (DULCOLAX) 5 MG EC tablet  Refer to instructions sent via [employer]. 4 tablet 0     calcium carbonate (TUMS) 500 MG chewable tablet 1 tab by mouth in morning or at night as needed       docusate sodium (COLACE) 50 MG capsule Take 1 capsule (50 mg) by mouth 2 times daily as needed for constipation 30 capsule 0     dutasteride (AVODART) 0.5 MG capsule Take 1 capsule (0.5 mg) by mouth daily 30 capsule 1     flecainide (TAMBOCOR) 50 MG tablet Take 1 tablet (50 mg) by mouth 2 times daily 180 tablet 2     fluticasone (FLONASE) 50 MCG/ACT nasal spray Spray 1 spray into both nostrils daily as needed for rhinitis 16 g 11     loratadine (CLARITIN) 10 MG tablet Take 10 mg by mouth daily as needed        metoprolol tartrate (LOPRESSOR) 25 MG tablet Take 0.5 tablets (12.5 mg) by mouth 2 times daily TAKE 1 TABLET BY MOUTH TWICE A DAY 90 tablet 3     multivitamin (CENTRUM SILVER) tablet Take 1 tablet by mouth daily       Nutritional Supplements (ENSURE PO)        polyethylene glycol (MIRALAX) 17 g packet Take 1 packet by mouth daily as needed for constipation       PREVIDENT 5000 DRY MOUTH 1.1 % GEL topical gel Take by mouth daily  3     senna-docusate (SENOKOT-S/PERICOLACE) 8.6-50 MG tablet Take 2 tablets by mouth daily       simvastatin (ZOCOR) 10 MG tablet Take 1 tablet (10 mg) by mouth daily 90 tablet 3     tamsulosin (FLOMAX) 0.4 MG capsule Take 1 capsule (0.4 mg) by mouth daily 30 capsule 0     vitamin D3 (CHOLECALCIFEROL) 50 mcg (2000 units) tablet Take 1 tablet (50 mcg) by mouth daily       warfarin ANTICOAGULANT (COUMADIN) 2.5 MG tablet TAKE 1.25 MG (1/2 TABLET) MON/FRI AND 2.5 MG (1 TABLET) ALL OTHER DAYS. NEED APPT. (Patient taking differently: Taking 2.5mg daily. Fridays 3.75mg) 90 tablet 0     amoxicillin (AMOXIL) 500 MG capsule 4 x 500mg tabs by mouth 1 hour before dental appointments.         PAST SURGICAL HISTORY:  Past Surgical History:   Procedure Laterality Date     ANESTHESIA CARDIOVERSION  4/21/2011    Procedure:ANESTHESIA CARDIOVERSION;  Surgeon:GENERIC ANESTHESIA PROVIDER; Location:[facility]     ARTHROSCOPY KNEE RT/LT      LT     CATARACT IOL, RT/LT  2017     COLONOSCOPY  10/16/2012    Procedure: COLONOSCOPY;  Colonoscopy, screening;  Surgeon: Reg Feliciano MD;  Location: [facility]     COLONOSCOPY N/A 4/14/2021    Procedure: COLONOSCOPY;  Surgeon: Reg Holm MD;  Location: [facility]     ESOPHAGOSCOPY, GASTROSCOPY, DUODENOSCOPY (EGD), COMBINED N/A 10/7/2020    Procedure: ESOPHAGOGASTRODUODENOSCOPY, WITH BIOPSY;  Surgeon: Raul Sawyer DO;  Location: [facility] OR     H ABLATION FOCAL AFIB  3/5/2013    PVI     H ABLATION FOCAL AFIB  01/01/2018     HC BONE MARROW/STEM TRANSPLANT ALLOGENIC  5/8/2007     INSERT PORT VASCULAR ACCESS       JOINT REPLACEMTN, KNEE RT/LT  3/28/12    SHAWN     VASECTOMY  1990       ALLERGIES:     Allergies   Allergen Reactions     Seasonal Allergies        FAMILY HISTORY:  - Premature coronary artery disease  - Atrial fibrillation  - Sudden cardiac death     SOCIAL HISTORY:  Social History     Tobacco Use     Smoking status: Never Smoker     Smokeless tobacco: Never Used   Substance Use Topics     Alcohol use: No     Drug use: No       ROS:   Constitutional: No fever, chills, or sweats. Weight stable.   ENT: No visual disturbance, ear ache, epistaxis, sore throat.   Cardiovascular: As per HPI.   Respiratory: No cough, hemoptysis.    GI: No nausea, vomiting, hematemesis, melena, or hematochezia.   : No hematuria.   Integument: Negative.   Psychiatric: Negative.   Hematologic:  Easy bruising, no easy bleeding.  Neuro: Negative.   Endocrinology: No significant heat or cold intolerance   Musculoskeletal: No myalgia.    Exam:  /64 (BP Location: Left arm, Patient Position: Chair, Cuff Size: Adult Regular)   Pulse 72   Wt 68 kg (150 lb)   SpO2 98%   BMI 20.34 kg/m    GENERAL APPEARANCE: elderly male in NAD sitting in chair, polite and conversational  HEENT: at/nc, eomi, mmm, sclera anicteric, no oral ulcerations, no  xanthelasmas   NECK: no adenopathy.  thyroid normal to palpation, JVP not elevated  RESPIRATORY: CTAB on RA, nonlabored, no accessory muscle use  CARDIOVASCULAR: rrr, s1, s2, no murmur, rub or gallop, no s3 or s4  ABDOMEN: soft, nt/nd, bs+  EXTREMITIES: no gross deformity, adequate muscle bulk, no edema  NEURO: alert, interactive, speech fluent, grossly nonfocal, moving all 4  VASC: Radial and DP pulses are normal in volumes and symmetric bilaterally  SKIN: no ecchymoses, no rashes on exposed skin      Labs:  CBC RESULTS:   Lab Results   Component Value Date    WBC 8.8 07/05/2021    RBC 4.30 (L) 07/05/2021    HGB 13.8 07/05/2021    HCT 38.9 (L) 07/05/2021    MCV 91 07/05/2021    MCH 32.1 07/05/2021    MCHC 35.5 07/05/2021    RDW 13.0 07/05/2021     (L) 07/05/2021       BMP RESULTS:  Lab Results   Component Value Date     07/05/2021    POTASSIUM 3.3 (L) 07/05/2021    CHLORIDE 103 07/05/2021    CO2 28 07/05/2021    ANIONGAP 4 07/05/2021    GLC 97 07/05/2021    BUN 25 07/05/2021    CR 1.15 07/05/2021    GFRESTIMATED 63 07/05/2021    GFRESTBLACK 73 07/05/2021    GILBERT 8.3 (L) 07/05/2021        INR RESULTS:  Lab Results   Component Value Date    INR 1.9 07/16/2021    INR 2.7 (A) 07/09/2021    INR 2.87 (H) 07/05/2021    INR 1.8 (A) 07/02/2021       Procedures:      Assessment and Plan:   71 year old male with complex PMH notable for atrial fibrillation and atrial tachycardia, here for yearly follow up    1. Paroxysmal atrial fibrillation: underwent PVI ablation with repeat PVI on 1/2018 without any rpeeat recurrence since repeat ablation. CHADSVASC 5 and with h/o antiphospholipid syndrome  2. Paroxysmal atrial tachycardia: refractory to sotalol    -continue flecanide 50mg BID  -change metoprolol to succinate 25mg qday to take in evening  -continue warfarin (h/o antiphospholipid) with goal INR 2-3  -RTC with Dr. Matt in 1 year for annual follow up    The patient was seen and discussed with Dr. Matt, who agrees  with the above assessment and plan.    Nevin Angeles MD  Cardiology Fellow PGY4  P: [phone]      I have seen, interviewed, and examined patient. I have reviewed the laboratory tests, imaging, and other investigations. I have reviewed the management plan with the patient. I discussed with the team and agree with the findings and plan in this resident/fellow/nurse practitioner's note. In addition, changes in the physical examination, assessment and plan have been incorporated into the note by myself, as to make it a single cohesive document.       Bekah Matt MD, MS  Cardiology/Cardiac EP Attending Staff            CC  Patient Care Team:  Anya Nowak MD as PCP - General (Family Practice)  Augusto Miller MD as Referring Physician (Internal Medicine)  Jesusita Mejia PA-C as Physician Assistant  Pino Sharma MD as MD (Internal Medicine)  Fabi Jimenez MD as MD (INTERNAL MEDICINE - ENDOCRINOLOGY, DIABETES & METABOLISM)  Bekah Matt MD as MD (Cardiology)  Tina Mejia, RN as Specialty Care Coordinator (Cardiology)  Shanelle Yeager, KOSTAS as Specialty Care Coordinator (BMT - Adult)  Anya Nowak MD as Assigned PCP  Cindy Mitchell MD as Assigned Surgical Provider  Bekah Matt MD as Assigned Heart and Vascular Provider  Ángel Hill MD as Assigned Neuroscience Provider  Leslie Arredondo [facility] as Pharmacist (Pharmacist)  Rosalva Samuels MD as MD (Family Medicine)  Chelsey Crews MD as Assigned Gastroenterology Provider

## 2021-07-19 NOTE — LETTER
7/19/2021      RE: Haresh Rock  [address]  [place] [address]       Dear Colleague,    Thank you for the opportunity to participate in the care of your patient, Haresh Rock, at the [facility]. Please see a copy of my visit note below.    HPI: The patient is a 71-year-old gentleman with a history of paroxysmal atrial fibrillation and atrial tachycardia.  He is status post circumferential pulmonary vein isolation for paroxysmal atrial fibrillation in 03/2013, which provided him with an arrhythmia period lasting for about 5 years. Towards the end of 2017, he had recurrent atrial fibrillation and underwent re-isolation of pulmonary veins in 01/2018.  During that ablation, the patient was also found to have a right atrial tachycardia for which he underwent an ablation.  Of note, during that ablation an atypical atrial flutter was also noted which kept degenerating to atrial fibrillation and was not mappable.In September 2018, his sotalol dose of 40 mg twice daily was increased to 80 mg twice daily. Despite the increase, a Zio Patch done in September and 10/2018 showed 55 episodes of atrial tachycardia, the longest one lasting for several hours. The patient was not symptomatic for all of the episodes. At his last appointment in October 2018, sotalol was discontinued, and patient was started on metoprolol and then flecainide was initiated. In October 2018, exercise stress test on flecainide did not show evidence of VT and no significant QRS widening (sub-optimal test with 77% of the age predicted maximal heart rate achieved).    Interval history:  In review of chart, called into clinic about 2 months ago with reports of increased issues with orthostasis and low baseline BPs. Decreased his metoprolol to 12.5mg bid which he reports improved his symptoms some. States today he still has some issues with orthostasis but not  as severe. States that his baseline BPs seem to run in the 110s to 120s SBP. States he does have some GAY, but is able to be pretty active and was out mowing his lawn a few weeks ago.   He notes he was recently diagnosed with Parkinson's disease and has had some issues with urinary retention recently.   States he has had one fall in the last year. Does have foot drop so he does catch his foot from time to time.  Notes some decreased appetite and weight loss. States just doesn't feel much desire to eat. Does have evidence of aspiration seen on his prior evaluations.     PAST MEDICAL HISTORY:  Past Medical History:   Diagnosis Date     Abnormal dopamine scan (DaTSCAN) 2020 11/04/2020    [phone] 11/3/2020   Narrative & Impression   Examination: Nuclear medicine DATscan for Dopamine Receptor Localization.   Examination: NM Brain Image Tomographic (SPECT) DATscan   Date: 11/3/2020 3:21 PM   Indication: Parkinsonism   Comparison: None   Additional Information: none   Interfering Medications: None   Technique:   The patient initially received 1 ml of Lugol's solution orally prior     Antiphospholipid antibody syndrome (H) 2005    Ravi's     Antiphospholipid antibody syndrome (H)     Ravi's, noted negative per testing re-done in 2008 in hematology note 01/13/2009     Articulation disorder 09/23/2020     Atrial fibrillation (H) 04/2011     Cirrhosis (H)      CKD (chronic kidney disease) stage 2, GFR 60-89 ml/min      Diabetes mellitus type II     steroid induced     DJD (degenerative joint disease) of knee     SHAWN, worse on right     DVT (deep venous thrombosis) (H)      ED (erectile dysfunction)      Gallbladder polyp 05/2010     L[facility]-[place] Disease 2007    BMT     Hemochromatosis      Hodgkin's disease NOS     5 cycles of ABVD in 2011     HTN (hypertension)      Hyperlipidaemia LDL goal <100      Multiple thyroid nodules     benign      Myeloproliferative disorder (H)     atypical, [facility]     Parkinson disease  (H) 09/24/2020     PE (pulmonary embolism) 11/2004     Polyneuropathy      Primary pulmonary hypertension (H)      Thrombocytopenia (H) 6/8/2021       CURRENT MEDICATIONS:  Current Outpatient Medications   Medication Sig Dispense Refill     acetaminophen (TYLENOL) 500 MG tablet 500mg tab as needed       bisacodyl (DULCOLAX) 5 MG EC tablet Refer to instructions sent via [employer]. 4 tablet 0     calcium carbonate (TUMS) 500 MG chewable tablet 1 tab by mouth in morning or at night as needed       docusate sodium (COLACE) 50 MG capsule Take 1 capsule (50 mg) by mouth 2 times daily as needed for constipation 30 capsule 0     dutasteride (AVODART) 0.5 MG capsule Take 1 capsule (0.5 mg) by mouth daily 30 capsule 1     flecainide (TAMBOCOR) 50 MG tablet Take 1 tablet (50 mg) by mouth 2 times daily 180 tablet 2     fluticasone (FLONASE) 50 MCG/ACT nasal spray Spray 1 spray into both nostrils daily as needed for rhinitis 16 g 11     loratadine (CLARITIN) 10 MG tablet Take 10 mg by mouth daily as needed        metoprolol tartrate (LOPRESSOR) 25 MG tablet Take 0.5 tablets (12.5 mg) by mouth 2 times daily TAKE 1 TABLET BY MOUTH TWICE A DAY 90 tablet 3     multivitamin (CENTRUM SILVER) tablet Take 1 tablet by mouth daily       Nutritional Supplements (ENSURE PO)        polyethylene glycol (MIRALAX) 17 g packet Take 1 packet by mouth daily as needed for constipation       PREVIDENT 5000 DRY MOUTH 1.1 % GEL topical gel Take by mouth daily  3     senna-docusate (SENOKOT-S/PERICOLACE) 8.6-50 MG tablet Take 2 tablets by mouth daily       simvastatin (ZOCOR) 10 MG tablet Take 1 tablet (10 mg) by mouth daily 90 tablet 3     tamsulosin (FLOMAX) 0.4 MG capsule Take 1 capsule (0.4 mg) by mouth daily 30 capsule 0     vitamin D3 (CHOLECALCIFEROL) 50 mcg (2000 units) tablet Take 1 tablet (50 mcg) by mouth daily       warfarin ANTICOAGULANT (COUMADIN) 2.5 MG tablet TAKE 1.25 MG (1/2 TABLET) MON/FRI AND 2.5 MG (1 TABLET) ALL OTHER DAYS. NEED  APPT. (Patient taking differently: Taking 2.5mg daily. Fridays 3.75mg) 90 tablet 0     amoxicillin (AMOXIL) 500 MG capsule 4 x 500mg tabs by mouth 1 hour before dental appointments.         PAST SURGICAL HISTORY:  Past Surgical History:   Procedure Laterality Date     ANESTHESIA CARDIOVERSION  4/21/2011    Procedure:ANESTHESIA CARDIOVERSION; Surgeon:GENERIC ANESTHESIA PROVIDER; Location:[facility] OR     ARTHROSCOPY KNEE RT/LT      LT     CATARACT IOL, RT/LT  2017     COLONOSCOPY  10/16/2012    Procedure: COLONOSCOPY;  Colonoscopy, screening;  Surgeon: Reg Feliciano MD;  Location: [facility] OR     COLONOSCOPY N/A 4/14/2021    Procedure: COLONOSCOPY;  Surgeon: Reg Holm MD;  Location: [facility] GI     ESOPHAGOSCOPY, GASTROSCOPY, DUODENOSCOPY (EGD), COMBINED N/A 10/7/2020    Procedure: ESOPHAGOGASTRODUODENOSCOPY, WITH BIOPSY;  Surgeon: Raul Sawyer DO;  Location: [facility] OR     H ABLATION FOCAL AFIB  3/5/2013    PVI     H ABLATION FOCAL AFIB  01/01/2018     HC BONE MARROW/STEM TRANSPLANT ALLOGENIC  5/8/2007     INSERT PORT VASCULAR ACCESS       JOINT REPLACEMTN, KNEE RT/LT  3/28/12    SHAWN     VASECTOMY  1990       ALLERGIES:     Allergies   Allergen Reactions     Seasonal Allergies        FAMILY HISTORY:  - Premature coronary artery disease  - Atrial fibrillation  - Sudden cardiac death     SOCIAL HISTORY:  Social History     Tobacco Use     Smoking status: Never Smoker     Smokeless tobacco: Never Used   Substance Use Topics     Alcohol use: No     Drug use: No       ROS:   Constitutional: No fever, chills, or sweats. Weight stable.   ENT: No visual disturbance, ear ache, epistaxis, sore throat.   Cardiovascular: As per HPI.   Respiratory: No cough, hemoptysis.    GI: No nausea, vomiting, hematemesis, melena, or hematochezia.   : No hematuria.   Integument: Negative.   Psychiatric: Negative.   Hematologic:  Easy bruising, no easy bleeding.  Neuro: Negative.   Endocrinology: No significant heat or cold intolerance    Musculoskeletal: No myalgia.    Exam:  /64 (BP Location: Left arm, Patient Position: Chair, Cuff Size: Adult Regular)   Pulse 72   Wt 68 kg (150 lb)   SpO2 98%   BMI 20.34 kg/m    GENERAL APPEARANCE: elderly male in NAD sitting in chair, polite and conversational  HEENT: at/nc, eomi, mmm, sclera anicteric, no oral ulcerations, no xanthelasmas   NECK: no adenopathy.  thyroid normal to palpation, JVP not elevated  RESPIRATORY: CTAB on RA, nonlabored, no accessory muscle use  CARDIOVASCULAR: rrr, s1, s2, no murmur, rub or gallop, no s3 or s4  ABDOMEN: soft, nt/nd, bs+  EXTREMITIES: no gross deformity, adequate muscle bulk, no edema  NEURO: alert, interactive, speech fluent, grossly nonfocal, moving all 4  VASC: Radial and DP pulses are normal in volumes and symmetric bilaterally  SKIN: no ecchymoses, no rashes on exposed skin      Labs:  CBC RESULTS:   Lab Results   Component Value Date    WBC 8.8 07/05/2021    RBC 4.30 (L) 07/05/2021    HGB 13.8 07/05/2021    HCT 38.9 (L) 07/05/2021    MCV 91 07/05/2021    MCH 32.1 07/05/2021    MCHC 35.5 07/05/2021    RDW 13.0 07/05/2021     (L) 07/05/2021       BMP RESULTS:  Lab Results   Component Value Date     07/05/2021    POTASSIUM 3.3 (L) 07/05/2021    CHLORIDE 103 07/05/2021    CO2 28 07/05/2021    ANIONGAP 4 07/05/2021    GLC 97 07/05/2021    BUN 25 07/05/2021    CR 1.15 07/05/2021    GFRESTIMATED 63 07/05/2021    GFRESTBLACK 73 07/05/2021    GILBERT 8.3 (L) 07/05/2021        INR RESULTS:  Lab Results   Component Value Date    INR 1.9 07/16/2021    INR 2.7 (A) 07/09/2021    INR 2.87 (H) 07/05/2021    INR 1.8 (A) 07/02/2021       Procedures:      Assessment and Plan:   71 year old male with complex PMH notable for atrial fibrillation and atrial tachycardia, here for yearly follow up    1. Paroxysmal atrial fibrillation: underwent PVI ablation with repeat PVI on 1/2018 without any rpeeat recurrence since repeat ablation. CHADSVASC 5 and with h/o  antiphospholipid syndrome  2. Paroxysmal atrial tachycardia: refractory to sotalol    -continue flecanide 50mg BID  -change metoprolol to succinate 25mg qday to take in evening  -continue warfarin (h/o antiphospholipid) with goal INR 2-3  -RTC with Dr. Matt in 1 year for annual follow up    The patient was seen and discussed with Dr. Matt, who agrees with the above assessment and plan.    Nevin Angeles MD  Cardiology Fellow PGY4  P: [phone]      I have seen, interviewed, and examined patient. I have reviewed the laboratory tests, imaging, and other investigations. I have reviewed the management plan with the patient. I discussed with the team and agree with the findings and plan in this resident/fellow/nurse practitioner's note. In addition, changes in the physical examination, assessment and plan have been incorporated into the note by myself, as to make it a single cohesive document.       Bekah Matt MD, MS  Cardiology/Cardiac EP Attending Staff      CC  Patient Care Team:  Anya Nowak MD as PCP - General (Family Practice)  Augusto Miller MD as Referring Physician (Internal Medicine)  Jesusita Mejia PA-C as Physician Assistant  Pino Sharma MD as MD (Internal Medicine)  Fabi Jimenez MD as MD (INTERNAL MEDICINE - ENDOCRINOLOGY, DIABETES & METABOLISM)  Bekah Matt MD as MD (Cardiology)  Tina Mejia, KOSTAS as Specialty Care Coordinator (Cardiology)  Shanelle Yeager, KOSTAS as Specialty Care Coordinator (BMT - Adult)  Anya Nowak MD as Assigned PCP  Cindy Mitchell MD as Assigned Surgical Provider  Bekah Matt MD as Assigned Heart and Vascular Provider  Ángel Hill MD as Assigned Neuroscience Provider  Leslie Arredondo [facility] as Pharmacist (Pharmacist)  Rosalva Samuels MD as MD (Family Medicine)  Chelsey Crews MD as Assigned Gastroenterology Provider           Please do not hesitate to contact me if you have any  questions/concerns.     Sincerely,     Bekah Matt MD

## 2021-07-19 NOTE — NURSING NOTE
Chief Complaint   Patient presents with     RECHECK     annual follow up. Pt reports recent Parkinsons diagnosis.       Initial /64 (BP Location: Left arm, Patient Position: Chair, Cuff Size: Adult Regular)   Pulse 72   Wt 68 kg (150 lb)   SpO2 98%   BMI 20.34 kg/m   Estimated body mass index is 20.34 kg/m  as calculated from the following:    Height as of 7/5/21: 1.829 m (6').    Weight as of this encounter: 68 kg (150 lb)..  BP completed using cuff size: regular    Annabelle Viera MA

## 2021-07-19 NOTE — PATIENT INSTRUCTIONS
Thank you for coming to the [facility] @ [facility]; please note the following instructions:    1. STOP metoprolol short acting    2.  START metoprolol sucinnate long acting 25 mg daily in the evening    3.  Dr. Matt recommends to follow up in 1 year.  The cardiology team will contact you to schedule when the time gets closer.    If you have any questions regarding your visit please contact your care team:     Cardiology  Telephone Number   Tiffany PÉREZ, RN  Salma KELSEY, RN   Mandi RAMIREZ, RMA  Annabelle NEFF, RMCHRYSTAL RUFFIN, LPN   [phone] (option 1)   For scheduling appts:     [phone] (select option 1)       For the Device Clinic (Pacemakers and ICD's)  RN's :  Chelsey Carrera   During business hours: [phone]    *After business hours:  [phone] (select option 4)      Normal test result notifications will be released via [employer] or mailed within 7 business days.  All other test results, will be communicated via telephone once reviewed by your cardiologist.    If you need a medication refill please contact your pharmacy.  Please allow 3 business days for your refill to be completed.    As always, thank you for trusting us with your health care needs!

## 2021-07-23 ENCOUNTER — TRANSFERRED RECORDS (OUTPATIENT)
Dept: HEALTH INFORMATION MANAGEMENT | Facility: CLINIC | Age: 71
End: 2021-07-23

## 2021-07-23 ENCOUNTER — ANTICOAGULATION THERAPY VISIT (OUTPATIENT)
Dept: FAMILY MEDICINE | Facility: CLINIC | Age: 71
End: 2021-07-23

## 2021-07-23 DIAGNOSIS — I48.91 ATRIAL FIBRILLATION (H): ICD-10-CM

## 2021-07-23 DIAGNOSIS — Z79.01 LONG TERM CURRENT USE OF ANTICOAGULANT THERAPY: Primary | ICD-10-CM

## 2021-07-23 DIAGNOSIS — I48.0 PAROXYSMAL ATRIAL FIBRILLATION (H): ICD-10-CM

## 2021-07-23 LAB — INR (EXTERNAL): 2.1 (ref 0.9–1.1)

## 2021-07-23 NOTE — PROGRESS NOTES
ANTICOAGULATION  MANAGEMENT-Patient Home Monitoring Result    Assessment     Therapeutic INR result of 2.1 . Goal range 2.0-3.0. Received via fax from [employer] home INR monitoring company.        Previous INR was therapeutic    Haresh was last contacted by phone:     Plan     Per home monitoring agreement with patient, patient was NOT contacted regarding therapeutic result today.  Patient is to continue current dose and continue to check INR with home monitor per protocol.  ?       OBJECTIVE    INR (External)   Date Value Ref Range Status   2021 2.1 (A) 0.9 - 1.1 Final     Factor 2 Assay   Date Value Ref Range Status   2007 58 (L) 60 - 140 % Final     Comment:     (Note)  CALLED TO [facility](INTERV. RADIOLOGY)[id number],        ASSESSMENT / PLAN  No question data found.  Anticoagulation Summary  As of 2021    INR goal:  2.0-3.0   TTR:  59.1 % (1 y)   INR used for dosin.1 (2021)   Warfarin maintenance plan:  3.75 mg (2.5 mg x 1.5) every Fri; 2.5 mg (2.5 mg x 1) all other days   Full warfarin instructions:  3.75 mg every Fri; 2.5 mg all other days   Weekly warfarin total:  18.75 mg   No change documented:  Blanca Rodriguez RN   Plan last modified:  Laverne Ferguson RN (2021)   Next INR check:     Priority:  Maintenance   Target end date:  Indefinite    Indications    Long-term (current) use of anticoagulants [Z79.01] [Z79.01]  Atrial fibrillation (H) [I48.91]  Antiphospholipid antibody syndrome (H) (Resolved) [D68.61]  Personal history of DVT (deep vein thrombosis) (Resolved) [Z86.718]  Atrial fibrillation  unspecified type (H) (Resolved) [I48.91]  Paroxysmal atrial fibrillation (H) [I48.0]             Anticoagulation Episode Summary     INR check location:      Preferred lab:  EXTERNAL LAB    Send INR reminders to:  CHELSEA CHILEL    Comments:  PEPPER okay to manage by exception      Anticoagulation Care Providers     Provider Role Specialty Phone number    Anya Nowak MD  [facility] [phone]

## 2021-07-26 ENCOUNTER — OFFICE VISIT (OUTPATIENT)
Dept: UROLOGY | Facility: CLINIC | Age: 71
End: 2021-07-26
Payer: COMMERCIAL

## 2021-07-26 DIAGNOSIS — N40.1 BENIGN PROSTATIC HYPERPLASIA WITH URINARY RETENTION: ICD-10-CM

## 2021-07-26 DIAGNOSIS — R33.8 BENIGN PROSTATIC HYPERPLASIA WITH URINARY RETENTION: ICD-10-CM

## 2021-07-26 DIAGNOSIS — R33.9 URINARY RETENTION: Primary | ICD-10-CM

## 2021-07-26 PROCEDURE — 52000 CYSTOURETHROSCOPY: CPT | Performed by: UROLOGY

## 2021-07-26 PROCEDURE — 51798 US URINE CAPACITY MEASURE: CPT | Performed by: UROLOGY

## 2021-07-26 RX ORDER — LIDOCAINE HYDROCHLORIDE 20 MG/ML
JELLY TOPICAL ONCE
Status: COMPLETED | OUTPATIENT
Start: 2021-07-26 | End: 2021-07-26

## 2021-07-26 RX ORDER — DUTASTERIDE 0.5 MG/1
0.5 CAPSULE, LIQUID FILLED ORAL DAILY
Qty: 90 CAPSULE | Refills: 3 | Status: SHIPPED | OUTPATIENT
Start: 2021-07-26 | End: 2021-11-16

## 2021-07-26 RX ORDER — TAMSULOSIN HYDROCHLORIDE 0.4 MG/1
0.4 CAPSULE ORAL DAILY
Qty: 90 CAPSULE | Refills: 3 | Status: SHIPPED | OUTPATIENT
Start: 2021-07-26 | End: 2021-08-04 | Stop reason: SINTOL

## 2021-07-26 RX ADMIN — LIDOCAINE HYDROCHLORIDE: 20 JELLY TOPICAL at 10:09

## 2021-07-26 ASSESSMENT — ENCOUNTER SYMPTOMS
SHORTNESS OF BREATH: 1
FEVER: 0
CONSTIPATION: 1
DIZZINESS: 1
MYALGIAS: 0
HEMATOCHEZIA: 0
SORE THROAT: 0
PARESTHESIAS: 0
ARTHRALGIAS: 0
CHILLS: 1
HEARTBURN: 1
PALPITATIONS: 0
DYSURIA: 0
JOINT SWELLING: 0
HEMATURIA: 0
ABDOMINAL PAIN: 0
COUGH: 1
HEADACHES: 0
NAUSEA: 0
WEAKNESS: 1
NERVOUS/ANXIOUS: 0
DIARRHEA: 0
FREQUENCY: 0
EYE PAIN: 0

## 2021-07-26 ASSESSMENT — ACTIVITIES OF DAILY LIVING (ADL): CURRENT_FUNCTION: NO ASSISTANCE NEEDED

## 2021-07-26 NOTE — PROGRESS NOTES
S: Haresh Rock is a 71 year old male returns for incomplete voiding.    Patient is draped and prepped.  Flexible cystoscopy placed under direct vision.      The anterior urethra is normal   The prostatic urethra showed bilateral lobe enlargement.     The length is 2cm,  the coaptation is 2 cm.     In the bladder there is trabeculation grade 1-2.    Assessment/Plan:  (R33.9) Urinary retention  (primary encounter diagnosis)  Comment:    Plan:     (N40.1,  R33.8) Benign prostatic hyperplasia with urinary retention  Comment:    Plan:      Patient has spasm and urine completely came out.  Will have patient to return to clinic later on today for bladder scan/recheck.   Cont with meds for now.

## 2021-07-26 NOTE — PROGRESS NOTES
Clinic Administered Medication Documentation          Injectable Medication Documentation    Patient was given urojet. Prior to medication administration, verified patients identity using patient s name and date of birth. Please see MAR and medication order for additional information. Patient instructed to remain in clinic for 15 minutes.      Was entire vial of medication used? Yes  Vial/Syringe: Single dose vial  Expiration Date:  10/22  Was this medication supplied by the patient? No  The following medication was given:     MEDICATION: urojet   ROUTE: SQ  SITE:urethra  DOSE: 100mg  LOT #: [id number]  :  IMS limited  EXPIRATION DATE:  10/22  NDC#: [address]-3

## 2021-07-26 NOTE — PROGRESS NOTES
Pt returned for PVR. Pt states only able to void small amounts frequently since cath was removed. PVR showed 393ml. Cath was replaced with a 16fr staright bard. After proper prep with iodine new cath was inserted without difficulty. A 10cc balloon was inflated and leg bag attached. Follow up instructions to be determined. MD updated    Shanice JARRELL RN Specialty Triage 7/26/2021 3:24 PM

## 2021-07-27 ENCOUNTER — OFFICE VISIT (OUTPATIENT)
Dept: FAMILY MEDICINE | Facility: CLINIC | Age: 71
End: 2021-07-27
Payer: COMMERCIAL

## 2021-07-27 ENCOUNTER — VIRTUAL VISIT (OUTPATIENT)
Dept: UROLOGY | Facility: CLINIC | Age: 71
End: 2021-07-27
Payer: COMMERCIAL

## 2021-07-27 VITALS
OXYGEN SATURATION: 95 % | HEIGHT: 72 IN | TEMPERATURE: 97.4 F | SYSTOLIC BLOOD PRESSURE: 101 MMHG | HEART RATE: 76 BPM | DIASTOLIC BLOOD PRESSURE: 66 MMHG | WEIGHT: 150 LBS | BODY MASS INDEX: 20.32 KG/M2

## 2021-07-27 DIAGNOSIS — K59.00 CONSTIPATION, UNSPECIFIED CONSTIPATION TYPE: ICD-10-CM

## 2021-07-27 DIAGNOSIS — E11.22 TYPE 2 DIABETES MELLITUS WITH STAGE 2 CHRONIC KIDNEY DISEASE, WITHOUT LONG-TERM CURRENT USE OF INSULIN (H): ICD-10-CM

## 2021-07-27 DIAGNOSIS — N40.1 BENIGN PROSTATIC HYPERPLASIA WITH URINARY RETENTION: Primary | ICD-10-CM

## 2021-07-27 DIAGNOSIS — R33.8 BENIGN PROSTATIC HYPERPLASIA WITH URINARY RETENTION: ICD-10-CM

## 2021-07-27 DIAGNOSIS — R70.0 ELEVATED SED RATE (ELEV SR): ICD-10-CM

## 2021-07-27 DIAGNOSIS — R33.8 BENIGN PROSTATIC HYPERPLASIA WITH URINARY RETENTION: Primary | ICD-10-CM

## 2021-07-27 DIAGNOSIS — N40.1 BENIGN PROSTATIC HYPERPLASIA WITH URINARY RETENTION: ICD-10-CM

## 2021-07-27 DIAGNOSIS — E78.5 HYPERLIPIDEMIA WITH TARGET LDL LESS THAN 100: ICD-10-CM

## 2021-07-27 DIAGNOSIS — Z00.00 ENCOUNTER FOR MEDICARE ANNUAL WELLNESS EXAM: Primary | ICD-10-CM

## 2021-07-27 DIAGNOSIS — N18.2 TYPE 2 DIABETES MELLITUS WITH STAGE 2 CHRONIC KIDNEY DISEASE, WITHOUT LONG-TERM CURRENT USE OF INSULIN (H): ICD-10-CM

## 2021-07-27 PROBLEM — I48.0 PAROXYSMAL ATRIAL FIBRILLATION (H): Status: ACTIVE | Noted: 2021-06-18

## 2021-07-27 PROBLEM — N18.30 CHRONIC KIDNEY DISEASE, STAGE 3 (H): Status: RESOLVED | Noted: 2021-01-19 | Resolved: 2021-07-27

## 2021-07-27 PROBLEM — F80.0 ARTICULATION DISORDER: Status: ACTIVE | Noted: 2020-09-23

## 2021-07-27 LAB
CREAT UR-MCNC: 237 MG/DL
ERYTHROCYTE [SEDIMENTATION RATE] IN BLOOD BY WESTERGREN METHOD: 107 MM/HR (ref 0–20)
MICROALBUMIN UR-MCNC: 148 MG/DL
MICROALBUMIN/CREAT UR: 62.45 MG/G CR (ref 0–17)

## 2021-07-27 PROCEDURE — 36415 COLL VENOUS BLD VENIPUNCTURE: CPT | Performed by: FAMILY MEDICINE

## 2021-07-27 PROCEDURE — 99213 OFFICE O/P EST LOW 20 MIN: CPT | Mod: 95 | Performed by: UROLOGY

## 2021-07-27 PROCEDURE — 99213 OFFICE O/P EST LOW 20 MIN: CPT | Mod: 25 | Performed by: FAMILY MEDICINE

## 2021-07-27 PROCEDURE — 85652 RBC SED RATE AUTOMATED: CPT | Performed by: FAMILY MEDICINE

## 2021-07-27 PROCEDURE — 99397 PER PM REEVAL EST PAT 65+ YR: CPT | Performed by: FAMILY MEDICINE

## 2021-07-27 PROCEDURE — 82043 UR ALBUMIN QUANTITATIVE: CPT | Performed by: FAMILY MEDICINE

## 2021-07-27 RX ORDER — SIMVASTATIN 10 MG
10 TABLET ORAL DAILY
Qty: 90 TABLET | Refills: 3 | Status: ON HOLD | OUTPATIENT
Start: 2021-07-27 | End: 2021-08-21

## 2021-07-27 ASSESSMENT — ENCOUNTER SYMPTOMS
SORE THROAT: 0
FEVER: 0
PALPITATIONS: 0
NERVOUS/ANXIOUS: 0
MYALGIAS: 0
ABDOMINAL PAIN: 0
HEADACHES: 0
FREQUENCY: 0
EYE PAIN: 0
HEMATOCHEZIA: 0
WEAKNESS: 1
COUGH: 1
DYSURIA: 0
CHILLS: 1
HEMATURIA: 0
NAUSEA: 0
DIARRHEA: 0
JOINT SWELLING: 0
PARESTHESIAS: 0
CONSTIPATION: 1
DIZZINESS: 1
ARTHRALGIAS: 0
SHORTNESS OF BREATH: 1
HEARTBURN: 1

## 2021-07-27 ASSESSMENT — MIFFLIN-ST. JEOR: SCORE: 1463.4

## 2021-07-27 ASSESSMENT — ACTIVITIES OF DAILY LIVING (ADL): CURRENT_FUNCTION: NO ASSISTANCE NEEDED

## 2021-07-27 NOTE — PROGRESS NOTES
"SUBJECTIVE:   Haresh Rock is a 71 year old male who presents for Preventive Visit.    Patient has been advised of split billing requirements and indicates understanding: Yes   Are you in the first 12 months of your Medicare coverage?  No    Patient also presents to follow-up diabetes. Diabetic Review of Systems - Medication compliance:  DIET controlled, Diabetic diet compliance:  compliant most of the time, Home glucose monitoring:  blood glucose record WAS NOT brought in today, Diabetic ROS: no polyuria or polydipsia, no chest pain, dyspnea or TIA's, no numbness, tingling or pain in extremities, no unusual visual symptoms. Hypercholesterolemia well controlled with current treatment plan without side effects.     BPH symptoms with recent ED visit for retention treated with Dill catheter and medications per urology.     He is scheduled to see an infectious disease specialist about weight loss and elevated ESR. Upon questioning, he did have an episode of nasal and congestion and headache a few weeks ago that responded to claritin. He denies fever or chills and his weight loss has plateaued with calorie supplementation.     Healthy Habits:     In general, how would you rate your overall health?  Fair    Frequency of exercise:  None    Do you usually eat at least 4 servings of fruit and vegetables a day, include whole grains    & fiber and avoid regularly eating high fat or \"junk\" foods?  No    Taking medications regularly:  Yes    Medication side effects:  Lightheadedness    Ability to successfully perform activities of daily living:  No assistance needed    Home Safety:  No safety concerns identified    Hearing Impairment:  No hearing concerns    In the past 6 months, have you been bothered by leaking of urine?  No    In general, how would you rate your overall mental or emotional health?  Good      PHQ-2 Total Score: 1    Additional concerns today:  No    Do you feel safe in your environment? Yes    Have you ever " done Advance Care Planning? (For example, a Health Directive, POLST, or a discussion with a medical provider or your loved ones about your wishes): Yes, patient states has an Advance Care Planning document and will bring a copy to the clinic.       Fall risk  Fallen 2 or more times in the past year?: No  Any fall with injury in the past year?: No    Cognitive Screening   1) Repeat 3 items (Leader, Season, Table)    2) Clock draw: NORMAL  3) 3 item recall: Recalls 2 objects   Results: NORMAL clock, 1-2 items recalled: COGNITIVE IMPAIRMENT LESS LIKELY    Mini-CogTM Copyright S Kris. Licensed by the author for use in [facility]; reprinted with permission (orville@[email or web address]). All rights reserved.      Do you have sleep apnea, excessive snoring or daytime drowsiness?: no    Reviewed and updated as needed this visit by clinical staff  Tobacco  Allergies  Meds  Problems  Med Hx  Surg Hx  Fam Hx  Soc Hx          Reviewed and updated as needed this visit by Provider  Tobacco  Allergies  Meds  Problems  Med Hx  Surg Hx  Fam Hx         Social History     Tobacco Use     Smoking status: Never Smoker     Smokeless tobacco: Never Used   Substance Use Topics     Alcohol use: No     If you drink alcohol do you typically have >3 drinks per day or >7 drinks per week? No    Alcohol Use 7/27/2021   Prescreen: >3 drinks/day or >7 drinks/week? -   Prescreen: >3 drinks/day or >7 drinks/week? No       Current providers sharing in care for this patient include:     Patient Care Team:  Anya Nowak MD as PCP - General (Family Practice)  Augusto Miller MD as Referring Physician (Internal Medicine)  Jesusita Mejia PA-C as Physician Assistant  Pino Sharma MD as MD (Internal Medicine)  Fabi Jimenez MD as MD (INTERNAL MEDICINE - ENDOCRINOLOGY, DIABETES & METABOLISM)  Bekah Matt MD as MD (Cardiology)  Tina Mejia, KOSTAS as Specialty Care Coordinator (Cardiology)  French,  Shanelle ESTEVEZ, RN as Specialty Care Coordinator (BMT - Adult)  Anya Nowak MD as Assigned PCP  Cindy Mitchell MD as Assigned Surgical Provider  Bekah Matt MD as Assigned Heart and Vascular Provider  Ángel Hill MD as Assigned Neuroscience Provider  Leslie Arredondo [facility] as Pharmacist (Pharmacist)  Rosalva Samuels MD as MD (Family Medicine)  Chelsey Crews MD as Assigned Gastroenterology Provider    The following health maintenance items are reviewed in Epic and correct as of today:  Health Maintenance Due   Topic Date Due     ZOSTER IMMUNIZATION (2 of 3) 11/14/2016     DIABETIC FOOT EXAM  04/23/2020     EYE EXAM  01/01/2021     MICROALBUMIN  07/20/2021     Patient Active Problem List   Diagnosis     Hemochromatosis     Jlcww-[id number]-hakt disease, chronic (H)     Advanced directives, counseling/discussion     Polyneuropathy     Status post total knee replacements     Status post bone marrow transplant (H)     Hyperlipidemia with target LDL less than 100     Atrial fibrillation (H)     Chronic rhinitis     Gallstones without obstruction of gallbladder     Long-term (current) use of anticoagulants [Z79.01]     Thyroid nodule     Renal hypertension     History of Hodgkin's lymphoma     History of irradiation, presenting hazards to health     Primary pulmonary hypertension (H)     Hereditary hemochromatosis (H)     Oropharyngeal dysphagia     Articulation disorder     Personal history of PE (pulmonary embolism)     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)     Parkinson disease (H)     Abnormal dopamine scan (DaTSCAN) 2020     ACP (advance care planning)     Thrombocytopenia (H)     Elevated sed rate (elev SR)     Paroxysmal atrial fibrillation (H)     Benign prostatic hyperplasia with urinary retention     Constipation     Past Surgical History:   Procedure Laterality Date     ANESTHESIA CARDIOVERSION  4/21/2011    Procedure:ANESTHESIA CARDIOVERSION; Surgeon:GENERIC  ANESTHESIA PROVIDER; Location:[facility] OR     ARTHROSCOPY KNEE RT/LT      LT     CATARACT IOL, RT/LT  2017     COLONOSCOPY  10/16/2012    Procedure: COLONOSCOPY;  Colonoscopy, screening;  Surgeon: Reg Feliciano MD;  Location: [facility] OR     COLONOSCOPY N/A 4/14/2021    Procedure: COLONOSCOPY;  Surgeon: Reg Holm MD;  Location: [facility] GI     ESOPHAGOSCOPY, GASTROSCOPY, DUODENOSCOPY (EGD), COMBINED N/A 10/7/2020    Procedure: ESOPHAGOGASTRODUODENOSCOPY, WITH BIOPSY;  Surgeon: Raul Sawyer DO;  Location: [facility] OR     H ABLATION FOCAL AFIB  3/5/2013    PVI     H ABLATION FOCAL AFIB  01/01/2018     HC BONE MARROW/STEM TRANSPLANT ALLOGENIC  5/8/2007     INSERT PORT VASCULAR ACCESS       JOINT REPLACEMTN, KNEE RT/LT  3/28/12    SHAWN     VASECTOMY  1990       Social History     Tobacco Use     Smoking status: Never Smoker     Smokeless tobacco: Never Used   Substance Use Topics     Alcohol use: No     Family History   Problem Relation Age of Onset     Hypertension Mother      Cerebrovascular Disease Mother      Breast Cancer Mother      Arrhythmia Brother      Arrhythmia Sister         supraventricular tachycardia     Thyroid Disease Sister      Other - See Comments Son         lives in [place]. no kids and not .     Obsessive Compulsive Disorder Son      Depression Son      Eating Disorder Son         eats when depressed     Obesity Son      Thyroid Cancer Daughter         had thyroid removed     Bipolar Disorder Daughter      Other - See Comments Daughter         lives in [place] - single with one son 9  yrs     Other - See Comments Sister      Alzheimer Disease Father      Diabetes Paternal Aunt      Gastrointestinal Disease Maternal Grandmother         colitis      Parkinsonism Other         2 cousins with parkinson     C.A.D. No family hx of          Current Outpatient Medications   Medication Sig Dispense Refill     acetaminophen (TYLENOL) 500 MG tablet 500mg tab as needed       amoxicillin  (AMOXIL) 500 MG capsule 4 x 500mg tabs by mouth 1 hour before dental appointments.       bisacodyl (DULCOLAX) 5 MG EC tablet Refer to instructions sent via [employer] 4 tablet 0     calcium carbonate (TUMS) 500 MG chewable tablet 1 tab by mouth in morning or at night as needed       docusate sodium (COLACE) 50 MG capsule Take 1 capsule (50 mg) by mouth 2 times daily as needed for constipation 30 capsule 0     dutasteride (AVODART) 0.5 MG capsule Take 1 capsule (0.5 mg) by mouth daily 90 capsule 3     flecainide (TAMBOCOR) 50 MG tablet Take 1 tablet (50 mg) by mouth 2 times daily 180 tablet 2     fluticasone (FLONASE) 50 MCG/ACT nasal spray Spray 1 spray into both nostrils daily as needed for rhinitis 16 g 11     loratadine (CLARITIN) 10 MG tablet Take 10 mg by mouth daily as needed        metoprolol succinate ER (TOPROL XL) 25 MG 24 hr tablet Take 1 tablet (25 mg) by mouth daily In the evening 90 tablet 3     multivitamin (CENTRUM SILVER) tablet Take 1 tablet by mouth daily       Nutritional Supplements (ENSURE PO)        polyethylene glycol (MIRALAX) 17 g packet Take 1 packet by mouth daily as needed for constipation       PREVIDENT 5000 DRY MOUTH 1.1 % GEL topical gel Take by mouth daily  3     senna-docusate (SENOKOT-S/PERICOLACE) 8.6-50 MG tablet Take 2 tablets by mouth daily       simvastatin (ZOCOR) 10 MG tablet Take 1 tablet (10 mg) by mouth daily 90 tablet 3     tamsulosin (FLOMAX) 0.4 MG capsule Take 1 capsule (0.4 mg) by mouth daily 90 capsule 3     vitamin D3 (CHOLECALCIFEROL) 50 mcg (2000 units) tablet Take 1 tablet (50 mcg) by mouth daily       warfarin ANTICOAGULANT (COUMADIN) 2.5 MG tablet TAKE 1.25 MG (1/2 TABLET) MON/FRI AND 2.5 MG (1 TABLET) ALL OTHER DAYS. NEED APPT. (Patient taking differently: Taking 2.5mg daily. Fridays 3.75mg) 90 tablet 0     Allergies   Allergen Reactions     Seasonal Allergies              Review of Systems   Constitutional: Positive for chills. Negative for fever.   HENT:  Positive for congestion. Negative for ear pain, hearing loss and sore throat.    Eyes: Negative for pain and visual disturbance.   Respiratory: Positive for cough and shortness of breath.    Cardiovascular: Negative for chest pain, palpitations and peripheral edema.   Gastrointestinal: Positive for constipation and heartburn. Negative for abdominal pain, diarrhea, hematochezia and nausea.   Genitourinary: Positive for impotence. Negative for discharge, dysuria, frequency, genital sores, hematuria and urgency.   Musculoskeletal: Negative for arthralgias, joint swelling and myalgias.   Skin: Negative for rash.   Neurological: Positive for dizziness and weakness. Negative for headaches and paresthesias.   Psychiatric/Behavioral: Negative for mood changes. The patient is not nervous/anxious.          OBJECTIVE:   /66   Pulse 76   Temp 97.4  F (36.3  C) (Oral)   Ht 1.829 m (6')   Wt 68 kg (150 lb)   SpO2 95%   BMI 20.34 kg/m   Estimated body mass index is 20.34 kg/m  as calculated from the following:    Height as of this encounter: 1.829 m (6').    Weight as of this encounter: 68 kg (150 lb).  Physical Exam  GENERAL: healthy, alert and no distress  EYES: Eyes grossly normal to inspection, PERRL and conjunctivae and sclerae normal  NECK: no adenopathy, no asymmetry, masses, or scars and thyroid normal to palpation  RESP: lungs clear to auscultation - no rales, rhonchi or wheezes  CV: regular rate and rhythm, normal S1 S2, no S3 or S4, no murmur, click or rub, no peripheral edema    ABDOMEN: soft, nontender, no hepatosplenomegaly, no masses and bowel sounds normal  MS: no gross musculoskeletal defects noted, no edema  SKIN: no suspicious lesions or rashes  NEURO: Normal strength and tone, sensory exam grossly normal and mentation intact  PSYCH: mentation appears normal, affect normal/bright    Diagnostic Test Results:  Labs reviewed in Epic    ASSESSMENT / PLAN:   (Z00.00) Encounter for Medicare annual wellness  exam  (primary encounter diagnosis)    (E11.22,  N18.2) Type 2 diabetes mellitus with stage 2 chronic kidney disease, without long-term current use of insulin (H)  Comment: diet controlled   Plan: Albumin Random Urine Quantitative with Creat         Ratio          (E78.5) Hyperlipidemia with target LDL less than 100  Comment: Well controlled with medications without side effects.   Plan: simvastatin (ZOCOR) 10 MG tablet          (N40.1,  R33.8) Benign prostatic hyperplasia with urinary retention  Comment: Dill catheter in place   Plan: continue medications and follow-up with urology     (K59.00) Constipation, unspecified constipation type  Comment: Well controlled with medications without side effects.     (R70.0) Elevated sed rate (elev SR)  Plan: ESR: Erythrocyte sedimentation rate        Infectious Disease consult as scheduled; consider temporal artery biopsy if persistent?       Patient has been advised of split billing requirements and indicates understanding: Yes  COUNSELING:  Reviewed preventive health counseling, as reflected in patient instructions  Special attention given to:       Regular exercise       Healthy diet/nutrition       Vision screening       Bladder control       Hepatitis C screening       Colon cancer screening       The ASCVD Risk score (Merari GEMMA Jr., et al., 2013) failed to calculate for the following reasons:    The valid total cholesterol range is 130 to 320 mg/dL    Estimated body mass index is 20.34 kg/m  as calculated from the following:    Height as of this encounter: 1.829 m (6').    Weight as of this encounter: 68 kg (150 lb).        He reports that he has never smoked. He has never used smokeless tobacco.      Appropriate preventive services were discussed with this patient, including applicable screening as appropriate for cardiovascular disease, diabetes, osteopenia/osteoporosis, and glaucoma.  As appropriate for age/gender, discussed screening for colorectal cancer, prostate  cancer, breast cancer, and cervical cancer. Checklist reviewing preventive services available has been given to the patient.    Reviewed patients plan of care and provided an AVS. The Basic Care Plan (routine screening as documented in Health Maintenance) for Haresh meets the Care Plan requirement. This Care Plan has been established and reviewed with the Patient and spouse.    Counseling Resources:  ATP IV Guidelines  Pooled Cohorts Equation Calculator  Breast Cancer Risk Calculator  Breast Cancer: Medication to Reduce Risk  FRAX Risk Assessment  ICSI Preventive Guidelines  Dietary Guidelines for Americans, 2010  "[employer]'s MyPlate  ASA Prophylaxis  Lung CA Screening    Anya Nowak MD  [facility]    Identified Health Risks:    The patient was provided with suggestions to help him develop a healthy physical lifestyle.  He is at risk for lack of exercise and has been provided with information to increase physical activity for the benefit of his well-being.  The patient was counseled and encouraged to consider modifying their diet and eating habits. He was provided with information on recommended healthy diet options.

## 2021-07-27 NOTE — PATIENT INSTRUCTIONS
Patient Education     Laser Prostatectomy  You have an enlarged prostate gland. This is also called benign prostatic hyperplasia (BPH). BPH is not cancer. But it can cause problems with urination. To ease your symptoms, your healthcare provider may advise laser prostatectomy . This procedure uses a laser (concentrated light energy). The laser removes prostate tissue from around the urethra. The urethra is the tube that carries urine from the bladder out of the body. The surgery lets urine flow more freely. The laser destroys the prostate tissue. This means it can t be looked at for signs of cancer.     Types of prostate laser surgery  The type of laser surgery your healthcare provider will do may depend on your health, the size of your prostate, and the type of tools available. The different types of prostate laser surgery are:    Photoselective vaporization of the prostate (PVP). The laser is used to melt away (vaporize) the extra prostate tissue.    Holmium laser ablation of the prostate (HoLAP). This type of surgery is similar to PVP. But it uses a different kind of laser.    Holmium laser enucleation of the prostate (HoLEP). In this procedure, the laser cuts and removes extra tissue. A tool cuts the tissue into small pieces. This makes them easier to remove.   Possible risks and side effects of laser prostatectomy  All procedures have risks. Some possible risks of this procedure include:    Bleeding    Infection    Pneumonia    Blood clots    Scarring or narrowing of the urethra. This can cause trouble urinating.    Only some relief of symptoms    Erectile dysfunction (rare)    Loss of bladder control (very rare)    Loss of ejaculation  Getting ready for the procedure  Your healthcare team will give you detailed instructions on how to get ready for the procedure. Be sure to follow them.    You will be asked to read and sign a form consenting to the surgery. Be sure to read the entire document. Have all of your  questions answered before signing it.    Tell your healthcare team about all medicines you take. This includes prescription and over-the-counter medicines, vitamins, herbs, and other supplements. It also includes any blood thinners such as warfarin, clopidogrel, or daily aspirin. You may need to stop taking some or all of them before surgery.    Follow any directions you are given for not eating or drinking before your procedure.    When you arrive for the procedure, you will have an IV (intravenous) line placed. This gives you fluids and medicines during the procedure.    You will be given medicine to keep you from feeling pain (anesthesia) before the procedure. You may have 1 or more of the following:  ? Local anesthesia. Your bladder and urethra are numbed.  ? Regional anesthesia.  Your body below the waist is numbed.  ? General anesthesia. You are in a state like deep sleep.  During the procedure  Your healthcare provider can help explain the details of your surgery. These details depend on the type of laser surgery that will be done. In general, you can expect the following:    The procedure itself often takes less than an hour.    A thin, tube-like telescope (cystoscope) is put through the opening in your penis and into your urethra. This tool lets your provider see your urethra and your prostate. They are seen either through the cystoscope or on a screen.    The laser is put through the cystoscope. The laser is then used to destroy the extra prostate tissue.    You may have a tube (urinary catheter) to help your bladder drain urine for a short time after the procedure. It s removed when it s no longer needed.  After the procedure  You may go home the same day after your surgery. Or you may stay a night in the hospital. An adult friend or family member should drive you home. To get the best results from the procedure, follow your healthcare provider's instructions. Keep your follow-up appointments.  Your  prostate will likely be sore at first. This will get better as you heal. Here are some things you can expect:    You may be sent home with a catheter to drain urine from your bladder. If so, you may wear a leg bag until it's no longer needed. The catheter will allow the area to heal. It will help prevent painful urination.    Your provider may also prescribe antibiotics to prevent infection and pain medicine to ease any discomfort.    In about a week, you ll visit your provider to have your catheter removed. If swelling still makes urination difficult, the catheter may be left in longer. After the catheter is removed, you may need to urinate more often. This is normal and should get better with time.    For the first few weeks after your procedure, you may notice that your urine is cloudy. Or you may have blood or blood clots in your urine. This is normal while your body rids itself of the treated tissue. These symptoms may start to get better during the first few weeks. But it may take a few months before they go away. Your provider can tell you when you can have sex again and when you can go back to work.    You also may be told to not lift anything over 10 pounds (4.5 kg). And to not bend over to lift things from the ground.    Drink plenty of fluids to help flush out your bladder.  Getting back to sex  You may be glad to know that BPH and its treatments rarely cause problems with sex. You may find that you have retrograde ejaculation. This happens when semen goes into the bladder instead of the urethra during ejaculation. Retrograde ejaculation is common after procedures for BPH. But even if this does occur, orgasm shouldn t feel any different than it did before the procedure. And it should not cause any health problems or affect your sexual function. If you notice any problems with sex, talk with your healthcare provider. Help may be available.  When to call the healthcare provider  Contact your healthcare  provider right away if:    You have a fever of 100.4 F (38 C) or higher, or as directed by your provider    You have excessive bleeding    You have pain not relieved by medicines    You notice that no urine is draining from the catheter, or the catheter falls out    You have a frequent or very strong urge to urinate    You re not able to urinate, or notice a decrease in urine flow  [place] last reviewed this educational content on 6/1/2019 2000-2021 [employer] All rights reserved. This information is not intended as a substitute for professional medical care. Always follow your healthcare professional's instructions.

## 2021-07-27 NOTE — RESULT ENCOUNTER NOTE
Haresh,    Your sed rate test is still high. Keep your appointment with the infectious disease specialist.     You have a small of protein in your urine, which in some cases represents early effects of blood glucose or high blood pressure on the kidneys. Follow-up as planned.     Anya Nowak MD

## 2021-07-27 NOTE — PATIENT INSTRUCTIONS
Patient Education   Personalized Prevention Plan  You are due for the preventive services outlined below.  Your care team is available to assist you in scheduling these services.  If you have already completed any of these items, please share that information with your care team to update in your medical record.  Health Maintenance Due   Topic Date Due     Zoster (Shingles) Vaccine (2 of 3) 11/14/2016     Diabetic Foot Exam  04/23/2020     Eye Exam  01/01/2021     Kidney Microalbumin Urine Test  07/20/2021     Your Health Risk Assessment indicates you feel you are not in good health    A healthy lifestyle helps keep the body fit and the mind alert. It helps protect you from disease, helps you fight disease, and helps prevent chronic disease (disease that doesn't go away) from getting worse. This is important as you get older and begin to notice twinges in muscles and joints and a decline in the strength and stamina you once took for granted. A healthy lifestyle includes good healthcare, good nutrition, weight control, recreation, and regular exercise. Avoid harmful substances and do what you can to keep safe. Another part of a healthy lifestyle is stay mentally active and socially involved.    Good healthcare     Have a wellness visit every year.     If you have new symptoms, let us know right away. Don't wait until the next checkup.     Take medicines exactly as prescribed and keep your medicines in a safe place. Tell us if your medicine causes problems.   Healthy diet and weight control     Eat 3 or 4 small, nutritious, low-fat, high-fiber meals a day. Include a variety of fruits, vegetables, and whole-grain foods.     Make sure you get enough calcium in your diet. Calcium, vitamin D, and exercise help prevent osteoporosis (bone thinning).     If you live alone, try eating with others when you can. That way you get a good meal and have company while you eat it.     Try to keep a healthy weight. If you eat more  calories than your body uses for energy, it will be stored as fat and you will gain weight.     Recreation   Recreation is not limited to sports and team events. It includes any activity that provides relaxation, interest, enjoyment, and exercise. Recreation provides an outlet for physical, mental, and social energy. It can give a sense of worth and achievement. It can help you stay healthy.    Mental Exercise and Social Involvement  Mental and emotional health is as important as physical health. Keep in touch with friends and family. Stay as active as possible. Continue to learn and challenge yourself.   Things you can do to stay mentally active are:    Learn something new, like a foreign language or musical instrument.     Play SCRABBLE or do crossword puzzles. If you cannot find people to play these games with you at home, you can play them with others on your computer through the Internet.     Join a games club--anything from card games to chess or checkers or lawn bowling.     Start a new hobby.     Go back to school.     Volunteer.     Read.   Keep up with world events.    Exercise for a Healthier Heart  You may wonder how you can improve the health of your heart. If you re thinking about exercise, you re on the right track. You don t need to become an athlete. But you do need a certain amount of brisk exercise to help strengthen your heart. If you have been diagnosed with a heart condition, your healthcare provider may advise exercise to help stabilize your condition. To help make exercise a habit, choose safe, fun activities.      Exercise with a friend. When activity is fun, you're more likely to stick with it.   Before you start  Check with your healthcare provider before starting an exercise program. This is especially important if you have not been active for a while. It's also important if you have a long-term (chronic) health problem such as heart disease, diabetes, or obesity. Or if you are at high  risk for having these problems.   Why exercise?  Exercising regularly offers many healthy rewards. It can help you do all of the following:     Improve your blood cholesterol level to help prevent further heart trouble    Lower your blood pressure to help prevent a stroke or heart attack    Control diabetes, or reduce your risk of getting this disease    Improve your heart and lung function    Reach and stay at a healthy weight    Make your muscles stronger so you can stay active    Prevent falls and fractures by slowing the loss of bone mass (osteoporosis)    Manage stress better    Reduce your blood pressure    Improve your sense of self and your body image  Exercise tips      Ease into your routine. Set small goals. Then build on them. If you are not sure what your activity level should be, talk with your healthcare provider first before starting an exercise routine.    Exercise on most days. Aim for a total of 150 minutes (2 hours and 30 minutes) or more of moderate-intensity aerobic activity each week. Or 75 minutes (1 hour and 15 minutes) or more of vigorous-intensity aerobic activity each week. Or try for a combination of both. Moderate activity means that you breathe heavier and your heart rate increases but you can still talk. Think about doing 40 minutes of moderate exercise, 3 to 4 times a week. For best results, activity should last for about 40 minutes to lower blood pressure and cholesterol. It's OK to work up to the 40-minute period over time. Examples of moderate-intensity activity are walking 1 mile in 15 minutes. Or doing 30 to 45 minutes of yard work.    Step up your daily activity level.  Along with your exercise program, try being more active the whole day. Walk instead of drive. Or park further away so that you take more steps each day. Do more household tasks or yard work. You may not be able to meet the advised mount of physical activity. But doing some moderate- or vigorous-intensity aerobic  activity can help reduce your risk for heart disease. Your healthcare provider can help you figure out what is best for you.    Choose 1 or more activities you enjoy.  Walking is one of the easiest things you can do. You can also try swimming, riding a bike, dancing, or taking an exercise class.    When to call your healthcare provider  Call your healthcare provider if you have any of these:     Chest pain or feel dizzy or lightheaded    Burning, tightness, pressure, or heaviness in your chest, neck, shoulders, back, or arms    Abnormal shortness of breath    More joint or muscle pain    A very fast or irregular heartbeat (palpitations)  [employer] last reviewed this educational content on 7/1/2019 2000-2021 [employer]. All rights reserved. This information is not intended as a substitute for professional medical care. Always follow your healthcare professional's instructions.          Understanding USDA MyPlate  The USDA has guidelines to help you make healthy food choices. These are called MyPlate. MyPlate shows the food groups that make up healthy meals using the image of a place setting. Before you eat, think about the healthiest choices for what to put on your plate or in your cup or bowl. To learn more about building a healthy plate, visit www.choosemyplate.gov.    The food groups    Fruits. Any fruit or 100% fruit juice counts as part of the Fruit Group. Fruits may be fresh, canned, frozen, or dried, and may be whole, cut-up, or pureed. Make 1/2 of your plate fruits and vegetables.    Vegetables. Any vegetable or 100% vegetable juice counts as a member of the Vegetable Group. Vegetables may be fresh, frozen, canned, or dried. They can be served raw or cooked and may be whole, cut-up, or mashed. Make 1/2 of your plate fruits and vegetables.    Grains. All foods made from grains are part of the Grains Group. These include wheat, rice, oats, cornmeal, and barley. Grains are often used to make  foods such as bread, pasta, oatmeal, cereal, tortillas, and grits. Grains should be no more than 1/4 of your plate. At least half of your grains should be whole grains.    Protein. This group includes meat, poultry, seafood, beans and peas, eggs, processed soy products (such as tofu), nuts (including nut butters), and seeds. Make protein choices no more than 1/4 of your plate. Meat and poultry choices should be lean or low fat.    Dairy. The Dairy Group includes all fluid milk products and foods made from milk that contain calcium, such as yogurt and cheese. (Foods that have little calcium, such as cream, butter, and cream cheese, are not part of this group.) Most dairy choices should be low-fat or fat-free.    Oils. Oils aren't a food group, but they do contain essential nutrients. However it's important to watch your intake of oils. These are fats that are liquid at room temperature. They include canola, corn, olive, soybean, vegetable, and sunflower oil. Foods that are mainly oil include mayonnaise, certain salad dressings, and soft margarines. You likely already get your daily oil allowance from the foods you eat.  Things to limit  Eating healthy also means limiting these things in your diet:       Salt (sodium). Many processed foods have a lot of sodium. To keep sodium intake down, eat fresh vegetables, meats, poultry, and seafood when possible. Purchase low-sodium, reduced-sodium, or no-salt-added food products at the store. And don't add salt to your meals at home. Instead, season them with herbs and spices such as dill, oregano, cumin, and paprika. Or try adding flavor with lemon or lime zest and juice.    Saturated fat. Saturated fats are most often found in animal products such as beef, pork, and chicken. They are often solid at room temperature, such as butter. To reduce your saturated fat intake, choose leaner cuts of meat and poultry. And try healthier cooking methods such as grilling, broiling,  roasting, or baking. For a simple lower-fat swap, use plain nonfat yogurt instead of mayonnaise when making potato salad or macaroni salad.    Added sugars. These are sugars added to foods. They are in foods such as ice cream, candy, soda, fruit drinks, sports drinks, energy drinks, cookies, pastries, jams, and syrups. Cut down on added sugars by sharing sweet treats with a family member or friend. You can also choose fruit for dessert, and drink water or other unsweetened beverages.     [employer] last reviewed this educational content on 6/1/2020 2000-2021 [employer]. All rights reserved. This information is not intended as a substitute for professional medical care. Always follow your healthcare professional's instructions.          Get the shingles vaccine, called Shingrix (given as 2 shots, 2 to 6 months apart), even if you have already had the Zostavax vaccine. Discuss getting the Shingix vaccine from your pharmacist, or schedule an ancillary shot visit here. Some insurances do not cover the cost of these vaccines.

## 2021-07-27 NOTE — PROGRESS NOTES
Haresh is a 71 year old who is being evaluated via a billable video visit.      How would you like to obtain your AVS? MyChart  If the video visit is dropped, the invitation should be resent by: Text to cell phone:    Will anyone else be joining your video visit? No      Video Start Time: 1500         Subjective   Haresh is a 71 year old who presents for the following health issues urinary retention    HPI     F/u for benign prostatic hyperplasia with urinary retention.  He was unable to void therefore a new cath was placed recently.  He has failed trial void twice.    Review of Systems   Constitutional, HEENT, cardiovascular, pulmonary, gi and gu systems are negative, except as otherwise noted.      Objective           Vitals:  No vitals were obtained today due to virtual visit.    Physical Exam   GENERAL: Healthy, alert and no distress  EYES: Eyes grossly normal to inspection.  No discharge or erythema, or obvious scleral/conjunctival abnormalities.  RESP: No audible wheeze, cough, or visible cyanosis.  No visible retractions or increased work of breathing.    SKIN: Visible skin clear. No significant rash, abnormal pigmentation or lesions.  NEURO: Cranial nerves grossly intact.  Mentation and speech appropriate for age.  PSYCH: Mentation appears normal, affect normal/bright, judgement and insight intact, normal speech and appearance well-groomed.    BPH with urinary retension: Discussed laser vaporization of the prostate.  Risks of bleeding, infection, incontinence,ED retrograde urethrogram discussed.  Discussed that even after surgery he may not be able to urinate.  Schedule for elective procedure (xps laser TURP) near future.            Video-Visit Details    Type of service:  Video Visit    Video End Time:3:19 PM    Originating Location (pt. Location): Home    Distant Location (provider location):  [facility]     Platform used for Video Visit: CIQUAL

## 2021-07-28 ENCOUNTER — TELEPHONE (OUTPATIENT)
Dept: UROLOGY | Facility: CLINIC | Age: 71
End: 2021-07-28

## 2021-07-28 NOTE — TELEPHONE ENCOUNTER
Type of surgery: XPS LASER VAPORIZATION   CPT 31467   Benign prostatic hyperplasia with urinary retention N40.1, R33.8    Location of surgery: [facility]  Date and time of surgery: 08/09/2021  Surgeon: JOSEPH  Pre-Op Appt Date: 08/04/2021  Post-Op Appt Date: 08/17/2021   Packet sent out: Yes  Pre-cert/Authorization completed:    No prior auth needed, per [employer] online list  Date: 07/28/21    [place] [facility] [place]    Thank you,   Nayana Gunter   Prior Authorization [facility]  [phone]

## 2021-07-29 ENCOUNTER — MYC MEDICAL ADVICE (OUTPATIENT)
Dept: UROLOGY | Facility: CLINIC | Age: 71
End: 2021-07-29

## 2021-07-29 NOTE — TELEPHONE ENCOUNTER
Forwarding to Dr. Hawley to advise if patient should continue taking tamsulosin and dutasteride if they are not effective. They were not dc'd at recent office visit, but would like to confirm.     Patient is scheduled for [facility] at [facility], [facility]. Will notify patient once response is received regarding medication.     Soniya Carlson RN, BSN [facility]

## 2021-07-30 ENCOUNTER — TRANSFERRED RECORDS (OUTPATIENT)
Dept: HEALTH INFORMATION MANAGEMENT | Facility: CLINIC | Age: 71
End: 2021-07-30

## 2021-07-30 ENCOUNTER — HOSPITAL ENCOUNTER (OUTPATIENT)
Dept: OCCUPATIONAL THERAPY | Facility: CLINIC | Age: 71
Setting detail: THERAPIES SERIES
End: 2021-07-30
Payer: COMMERCIAL

## 2021-07-30 ENCOUNTER — ANTICOAGULATION THERAPY VISIT (OUTPATIENT)
Dept: FAMILY MEDICINE | Facility: CLINIC | Age: 71
End: 2021-07-30

## 2021-07-30 DIAGNOSIS — I48.0 PAROXYSMAL ATRIAL FIBRILLATION (H): ICD-10-CM

## 2021-07-30 DIAGNOSIS — Z79.01 LONG TERM CURRENT USE OF ANTICOAGULANT THERAPY: Primary | ICD-10-CM

## 2021-07-30 LAB — INR (EXTERNAL): 2.4 (ref 0.9–1.1)

## 2021-07-30 PROCEDURE — 97530 THERAPEUTIC ACTIVITIES: CPT | Mod: GO | Performed by: OCCUPATIONAL THERAPIST

## 2021-07-30 PROCEDURE — 97129 THER IVNTJ 1ST 15 MIN: CPT | Mod: GO | Performed by: OCCUPATIONAL THERAPIST

## 2021-07-30 NOTE — PROGRESS NOTES
ANTICOAGULATION  MANAGEMENT-Patient Home Monitoring Result    Assessment     Therapeutic INR result of 2.4 . Goal range 2.0-3.0. Received via fax from [employer] home INR monitoring company.        Previous INR was therapeutic    Haresh was last contacted by phone:     Plan     Per home monitoring agreement with patient, patient was NOT contacted regarding therapeutic result today.  Patient is to continue current dose and continue to check INR with home monitor per protocol.  ?       OBJECTIVE    INR (External)   Date Value Ref Range Status   2021 2.4 (A) 0.9 - 1.1 Final     Factor 2 Assay   Date Value Ref Range Status   2007 58 (L) 60 - 140 % Final     Comment:     (Note)  CALLED TO TAMARA(INTERV. RADIOLOGY)[id number],        ASSESSMENT / PLAN  No question data found.  Anticoagulation Summary  As of 2021    INR goal:  2.0-3.0   TTR:  59.1 % (1 y)   INR used for dosin.4 (2021)   Warfarin maintenance plan:  3.75 mg (2.5 mg x 1.5) every Fri; 2.5 mg (2.5 mg x 1) all other days   Full warfarin instructions:  3.75 mg every Fri; 2.5 mg all other days   Weekly warfarin total:  18.75 mg   No change documented:  Allyn Doe RN   Plan last modified:  Laverne Ferguson, KOSTAS (2021)   Next INR check:     Priority:  Maintenance   Target end date:  Indefinite    Indications    Long-term (current) use of anticoagulants [Z79.01] [Z79.01]  Atrial fibrillation (H) [I48.91]  Antiphospholipid antibody syndrome (H) (Resolved) [D68.61]  Personal history of DVT (deep vein thrombosis) (Resolved) [Z86.718]  Atrial fibrillation  unspecified type (H) (Resolved) [I48.91]  Paroxysmal atrial fibrillation (H) [I48.0]             Anticoagulation Episode Summary     INR check location:      Preferred lab:  EXTERNAL LAB    Send INR reminders to:  CHELSEA CHILEL    Comments:  JESSICA okay to manage by exception      Anticoagulation Care Providers     Provider Role Specialty Phone number    Anya Nowak MD  [facility] [phone]

## 2021-08-02 ENCOUNTER — MYC MEDICAL ADVICE (OUTPATIENT)
Dept: FAMILY MEDICINE | Facility: CLINIC | Age: 71
End: 2021-08-02

## 2021-08-02 ENCOUNTER — PRE VISIT (OUTPATIENT)
Dept: NEUROLOGY | Facility: CLINIC | Age: 71
End: 2021-08-02

## 2021-08-02 DIAGNOSIS — J31.0 CHRONIC RHINITIS: ICD-10-CM

## 2021-08-02 DIAGNOSIS — R70.0 ELEVATED SED RATE (ELEV SR): Primary | ICD-10-CM

## 2021-08-02 RX ORDER — FLUTICASONE PROPIONATE 50 MCG
1 SPRAY, SUSPENSION (ML) NASAL DAILY PRN
Qty: 16 ML | Refills: 11 | OUTPATIENT
Start: 2021-08-02

## 2021-08-02 NOTE — TELEPHONE ENCOUNTER
Dr. Paul Hutson is seeing you tomorrow (then Dr. Gallegos for Preop on 8/4) - is he fine to hold coumadin for 5 days without bridging for his TURP?    Anya Nowak MD

## 2021-08-02 NOTE — TELEPHONE ENCOUNTER
FUTURE VISIT INFORMATION      FUTURE VISIT INFORMATION:    Date: 8/18/2021    Time: 1pm    Location: [facility]  REFERRAL INFORMATION:    Referring provider:  Dr. Hill    Referring providers clinic:  [facility]     Reason for visit/diagnosis  Parkinson's     RECORDS REQUESTED FROM:       Clinic name Comments Records Status Imaging Status   Internal Dr. Hill-6/10/2021, 2/12/2021, 11/12/2020     [facility]

## 2021-08-03 ENCOUNTER — HOSPITAL ENCOUNTER (OUTPATIENT)
Dept: OCCUPATIONAL THERAPY | Facility: CLINIC | Age: 71
Setting detail: THERAPIES SERIES
End: 2021-08-03
Payer: COMMERCIAL

## 2021-08-03 ENCOUNTER — DOCUMENTATION ONLY (OUTPATIENT)
Dept: ONCOLOGY | Facility: CLINIC | Age: 71
End: 2021-08-03

## 2021-08-03 ENCOUNTER — ONCOLOGY VISIT (OUTPATIENT)
Dept: TRANSPLANT | Facility: CLINIC | Age: 71
End: 2021-08-03
Attending: INTERNAL MEDICINE
Payer: COMMERCIAL

## 2021-08-03 ENCOUNTER — APPOINTMENT (OUTPATIENT)
Dept: LAB | Facility: CLINIC | Age: 71
End: 2021-08-03
Attending: INTERNAL MEDICINE
Payer: COMMERCIAL

## 2021-08-03 VITALS
HEART RATE: 69 BPM | WEIGHT: 151.6 LBS | OXYGEN SATURATION: 98 % | DIASTOLIC BLOOD PRESSURE: 51 MMHG | BODY MASS INDEX: 20.53 KG/M2 | HEIGHT: 72 IN | TEMPERATURE: 97.3 F | RESPIRATION RATE: 16 BRPM | SYSTOLIC BLOOD PRESSURE: 88 MMHG

## 2021-08-03 DIAGNOSIS — D89.811 GRAFT-VERSUS-HOST DISEASE, CHRONIC (H): ICD-10-CM

## 2021-08-03 DIAGNOSIS — C81.99 HODGKIN'S DISEASE OF EXTRANODAL OR SOLID ORGAN SITE (H): ICD-10-CM

## 2021-08-03 DIAGNOSIS — Z79.01 LONG TERM CURRENT USE OF ANTICOAGULANT THERAPY: ICD-10-CM

## 2021-08-03 DIAGNOSIS — E83.110 HEREDITARY HEMOCHROMATOSIS (H): ICD-10-CM

## 2021-08-03 LAB
ALBUMIN SERPL-MCNC: 3 G/DL (ref 3.4–5)
ALP SERPL-CCNC: 162 U/L (ref 40–150)
ALT SERPL W P-5'-P-CCNC: 18 U/L (ref 0–70)
ANION GAP SERPL CALCULATED.3IONS-SCNC: 1 MMOL/L (ref 3–14)
AST SERPL W P-5'-P-CCNC: 24 U/L (ref 0–45)
BASOPHILS # BLD AUTO: 0.1 10E3/UL (ref 0–0.2)
BASOPHILS NFR BLD AUTO: 1 %
BILIRUB SERPL-MCNC: 0.5 MG/DL (ref 0.2–1.3)
BUN SERPL-MCNC: 15 MG/DL (ref 7–30)
CALCIUM SERPL-MCNC: 8.5 MG/DL (ref 8.5–10.1)
CHLORIDE BLD-SCNC: 103 MMOL/L (ref 94–109)
CO2 SERPL-SCNC: 32 MMOL/L (ref 20–32)
CREAT SERPL-MCNC: 1.18 MG/DL (ref 0.66–1.25)
CRP SERPL-MCNC: 12.8 MG/L (ref 0–8)
EOSINOPHIL # BLD AUTO: 0.4 10E3/UL (ref 0–0.7)
EOSINOPHIL NFR BLD AUTO: 6 %
ERYTHROCYTE [DISTWIDTH] IN BLOOD BY AUTOMATED COUNT: 12.5 % (ref 10–15)
ERYTHROCYTE [SEDIMENTATION RATE] IN BLOOD BY WESTERGREN METHOD: 90 MM/HR (ref 0–20)
FERRITIN SERPL-MCNC: 174 NG/ML (ref 26–388)
GFR SERPL CREATININE-BSD FRML MDRD: 61 ML/MIN/1.73M2
GLUCOSE BLD-MCNC: 174 MG/DL (ref 70–99)
HCT VFR BLD AUTO: 42.6 % (ref 40–53)
HGB BLD-MCNC: 14.9 G/DL (ref 13.3–17.7)
IMM GRANULOCYTES # BLD: 0 10E3/UL
IMM GRANULOCYTES NFR BLD: 0 %
INR PPP: 2.17 (ref 0.85–1.15)
IRON SATN MFR SERPL: 56 % (ref 15–46)
IRON SERPL-MCNC: 117 UG/DL (ref 35–180)
LDH SERPL L TO P-CCNC: 166 U/L (ref 85–227)
LYMPHOCYTES # BLD AUTO: 2 10E3/UL (ref 0.8–5.3)
LYMPHOCYTES NFR BLD AUTO: 28 %
MCH RBC QN AUTO: 31.8 PG (ref 26.5–33)
MCHC RBC AUTO-ENTMCNC: 35 G/DL (ref 31.5–36.5)
MCV RBC AUTO: 91 FL (ref 78–100)
MONOCYTES # BLD AUTO: 0.6 10E3/UL (ref 0–1.3)
MONOCYTES NFR BLD AUTO: 9 %
NEUTROPHILS # BLD AUTO: 3.9 10E3/UL (ref 1.6–8.3)
NEUTROPHILS NFR BLD AUTO: 56 %
NRBC # BLD AUTO: 0 10E3/UL
NRBC BLD AUTO-RTO: 0 /100
PLATELET # BLD AUTO: 147 10E3/UL (ref 150–450)
POTASSIUM BLD-SCNC: 4.1 MMOL/L (ref 3.4–5.3)
PROT SERPL-MCNC: 7.5 G/DL (ref 6.8–8.8)
RBC # BLD AUTO: 4.69 10E6/UL (ref 4.4–5.9)
SODIUM SERPL-SCNC: 136 MMOL/L (ref 133–144)
TIBC SERPL-MCNC: 208 UG/DL (ref 240–430)
TOTAL PROTEIN SERUM FOR ELP: 6.9 G/DL (ref 6.8–8.8)
WBC # BLD AUTO: 7.1 10E3/UL (ref 4–11)

## 2021-08-03 PROCEDURE — G0463 HOSPITAL OUTPT CLINIC VISIT: HCPCS

## 2021-08-03 PROCEDURE — 97530 THERAPEUTIC ACTIVITIES: CPT | Mod: GO | Performed by: OCCUPATIONAL THERAPIST

## 2021-08-03 PROCEDURE — 97130 THER IVNTJ EA ADDL 15 MIN: CPT | Mod: GO | Performed by: OCCUPATIONAL THERAPIST

## 2021-08-03 PROCEDURE — 83550 IRON BINDING TEST: CPT | Performed by: INTERNAL MEDICINE

## 2021-08-03 PROCEDURE — 83615 LACTATE (LD) (LDH) ENZYME: CPT | Performed by: INTERNAL MEDICINE

## 2021-08-03 PROCEDURE — 84165 PROTEIN E-PHORESIS SERUM: CPT | Mod: TC | Performed by: PATHOLOGY

## 2021-08-03 PROCEDURE — 80053 COMPREHEN METABOLIC PANEL: CPT | Performed by: INTERNAL MEDICINE

## 2021-08-03 PROCEDURE — 36415 COLL VENOUS BLD VENIPUNCTURE: CPT | Performed by: INTERNAL MEDICINE

## 2021-08-03 PROCEDURE — 82728 ASSAY OF FERRITIN: CPT | Performed by: INTERNAL MEDICINE

## 2021-08-03 PROCEDURE — 85610 PROTHROMBIN TIME: CPT | Performed by: INTERNAL MEDICINE

## 2021-08-03 PROCEDURE — 93005 ELECTROCARDIOGRAM TRACING: CPT

## 2021-08-03 PROCEDURE — 97129 THER IVNTJ 1ST 15 MIN: CPT | Mod: GO | Performed by: OCCUPATIONAL THERAPIST

## 2021-08-03 PROCEDURE — G0463 HOSPITAL OUTPT CLINIC VISIT: HCPCS | Mod: 25

## 2021-08-03 PROCEDURE — 86140 C-REACTIVE PROTEIN: CPT | Performed by: INTERNAL MEDICINE

## 2021-08-03 PROCEDURE — 85652 RBC SED RATE AUTOMATED: CPT | Performed by: INTERNAL MEDICINE

## 2021-08-03 PROCEDURE — 85025 COMPLETE CBC W/AUTO DIFF WBC: CPT | Performed by: INTERNAL MEDICINE

## 2021-08-03 PROCEDURE — 99214 OFFICE O/P EST MOD 30 MIN: CPT | Performed by: INTERNAL MEDICINE

## 2021-08-03 PROCEDURE — 84155 ASSAY OF PROTEIN SERUM: CPT | Mod: 91 | Performed by: INTERNAL MEDICINE

## 2021-08-03 ASSESSMENT — PAIN SCALES - GENERAL: PAINLEVEL: NO PAIN (0)

## 2021-08-03 ASSESSMENT — MIFFLIN-ST. JEOR: SCORE: 1470.65

## 2021-08-03 NOTE — PROGRESS NOTES
Rapid Response Epic Documentation     Situation: Pt presented to lab with hypotension. Lab called RRT for patient.        Objective:  Pt with orthostatic hypotension.      Assessment:   Pt cam for labs and clinic visit today. Pt has has recent changes in his BP meds. Pt states when he stands up he is dizzy for a second and then does fine. Pt has not noticed any changes in last week with this dizziness.         BP:  Standin/52   After walkin/54   Sitting 116/65  Pulse: 68  Respiration: 18  SPO2: 99 %  Mental Status: Alert  CMS: Intact  Stroke Scale: Negative  EKG: Performed, NSR with 1st degree block      Treatment: Pt had IV established from lab. Brought pt to clinic and MD ordered EKG and liter of fluids. Pt left with MD after fluids started and EKG completed.     Medication: NS TKO      Location:  2nd floor lab        Disposition:      Transferred to clinic    Protocol Used:     Hypotension

## 2021-08-03 NOTE — NURSING NOTE
Chief Complaint   Patient presents with     Blood Draw     Labs drawn via  by RN in lab. VS taken.      Labs collected from venipuncture by RN. Vitals taken. Pt hypotensive in lab, 99/64, 88/51, 82/44, 77/45. Other vital signs wnl. Dr. Miller and BMT clinic notified. Paramedic called and IVF started. Pt escorted to BMT clinic for IVF and to be seen by Dr. Miller.     Nayana Rodriguez RN

## 2021-08-03 NOTE — PROGRESS NOTES
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  When I saw Haresh back in July, he was having trouble swallowing.  He had a video esophagram done and evaluation by speech therapy.  He was found to have difficulty swallowing with aspiration.  There was a question of whether or not he had Parkinson's.  He was seen by Dr. Ángel Hill.  He was found to have a bit of a shuffling gait.  He has no tremor though.  He has noticed that his handwriting has gotten smaller.  He did have a dopaminergic brain scan which showed a decrease in dopaminergic staining consistent with early Parkinson's.  He has not been given any treatment.  He has been eating regular food, but he avoids certain foods at that may stick in his upper esophagus.  He has no trouble with jello or applesauce.  He states if he chews chicken or fish he can swallow that well.  He has lost 40 pounds.  He is quite concerned about the weight loss and is wondering whether or not there might be another cause for the weight loss.  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     INTERVAL HISTORY  Haresh  Had urinary obstruction and seen in ED requiring catheter removing 800cc. Catheter remained. He was seen by urology He was on flomax and added dutasteride. Plan Laser BPH surgery on Aug 9. Today comes to clinic and while drawing blood found to be orthostatic hypotensive with BP99/66 sitting and 82/44 standing. Rapid response called .Give IV saline. Repeat BP lying 125/66 No fever or chill   Was seen by Dr Matt is Cards for orthostasis in July  Decreased metoprolol to 25mg each evening.He is not drinking much fluid    PAST MEDICAL HISTORY:  See my note from 04/2021    SOCIAL HISTORY:  See my note from 04/2021     FAMILY HISTORY:  See my note from  04/2021     REVIEW OF SYSTEMS:  A 12-point review of systems is done and is negative, except as in the HPI.     PHYSICAL EXAMINATION: BP (!) 88/51 (BP Location: Right arm, Patient Position: Sitting, Cuff Size: Adult Regular)   Pulse 69   Temp 97.3  F (36.3  C) (Oral)   Resp 16   Ht 1.829 m (6')   Wt 68.8 kg (151 lb 9.6 oz)   SpO2 98%   BMI 20.56 kg/m    pt is orthostatic but feels fine  EYES:  Grossly normal to inspection, no discharge, erythema or icterus.   SKIN:   skin is clear.  No significant rash or abnormal pigmentation.   NEUROLOGIC:  Cranial nerves seem to be intact.  Mentation and speech is appropriate for age. Left foot drop minimal resting tremor  PSYCHIATRIC:  Mentation appears normal.  He does not seem anxious today.   COR NR rate 70  1/6 SAYDA noted at LSB  ABD No organomegaly  RESP Clear to P and A   Urinary catheter in place    Results for PAMELA GRANADOS (MRN [id number]) as of 8/3/2021 10:50   Ref. Range 8/3/2021 10:02   Sodium Latest Ref Range: 133 - 144 mmol/L 136   Potassium Latest Ref Range: 3.4 - 5.3 mmol/L 4.1   Chloride Latest Ref Range: 94 - 109 mmol/L 103   Carbon Dioxide Latest Ref Range: 20 - 32 mmol/L 32   Urea Nitrogen Latest Ref Range: 7 - 30 mg/dL 15   Creatinine Latest Ref Range: 0.66 - 1.25 mg/dL 1.18   GFR Estimate Latest Ref Range: >60 mL/min/1.73m2 61   Calcium Latest Ref Range: 8.5 - 10.1 mg/dL 8.5   Anion Gap Latest Ref Range: 3 - 14 mmol/L 1 (L)   Albumin Latest Ref Range: 3.4 - 5.0 g/dL 3.0 (L)   Protein Total Latest Ref Range: 6.8 - 8.8 g/dL 7.5   Bilirubin Total Latest Ref Range: 0.2 - 1.3 mg/dL 0.5   Alkaline Phosphatase Latest Ref Range: 40 - 150 U/L 162 (H)   ALT Latest Ref Range: 0 - 70 U/L 18   AST Latest Ref Range: 0 - 45 U/L 24   CRP Inflammation Latest  Ref Range: 0.0 - 8.0 mg/L 12.8 (H)   Ferritin Latest Ref Range: 26 - 388 ng/mL 174   Iron Latest Ref Range: 35 - 180 ug/dL 117   Iron Binding Cap Latest Ref Range: 240 - 430 ug/dL 208 (L)   Iron Saturation Index Latest Ref Range: 15 - 46 % 56 (H)   Lactate Dehydrogenase Latest Ref Range: 85 - 227 U/L 166   Glucose Latest Ref Range: 70 - 99 mg/dL 174 (H)     EKG NSR 1st degree AV block with old septal infact NSR rate 68    ASSESSMENT:     1.  Myeloproliferative syndrome/MDS. JAL 2 positive  2.  Status post non-myeloablative allo-sib peripheral blood stem cell transplant.   3.  History of chronic iuoqn-aygqor-lhmv disease.   4.  History of Hodgkin's disease.   5.  History of cardiac arrhythmias, SVT and V tach.   6.  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   7.  History of cirrhosis.   8.  History of pulmonary emboli.   9.  History of elevated hemoglobin A1c.   10.  Thyroid nodule.    11.  Cholelithiasis.     12.  Diverticulosis.   13.  Anticoagulation.     14.  Status post bilateral knee replacement.     15.  Aortic stenosis  16. 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  17. Seborrheic keratosis of the back.  18 Weight loss  19.Dysphagia  20. Parkinson'  21 Anorexia  22. BPH  With obstruction  23. Orthostatic hypotension  24.Foot drop Left     ASSESSMENT:    I think the primary cause for his orthostasis is the flomax and the decreased fluid intake. I gave him 600cc IV saline and BP normalize is decreased caloric intake..Will have him stop the flomax Plan surgery for BPH Will hold warfarin for 5 days and then resume after surgery The parkinsons has been quite devastating. Has sl tremor and hand writing deterioration has been a problem.Worried about depression.  RTC in 4 months  I spent a total of 30  minutes face to face with Haresh Rock during today's office visit. Over 50% of this time was spent counseling the patient and coordinating the  care regarding weight loss parkinson's and dysphagia    Augusto Miller MD   [phone]

## 2021-08-03 NOTE — LETTER
8/3/2021         RE: Haresh Rock  [address] [place] [address]        Dear Colleague,    Thank you for referring your patient, Haresh Rock, to the [facility] [place]. Please see a copy of my visit note below.          BONE MARROW TRANSPLANT VISIT      Haresh returns to the [facility] for evaluation of an atypical myeloproliferative syndrome, status post nonmyeloablative allo-sib peripheral blood stem cell transplant, a history of chronic hmiaw-srowiz-wsef disease, a history of cirrhosis of the liver, history of hemochromatosis with C282Y homozygosity, a history of pulmonary emboli, a history of cardiac arrhythmias with an ablation and a history of Hodgkin's disease.  When I saw Haresh back in July, he was having trouble swallowing.  He had a video esophagram done and evaluation by speech therapy.  He was found to have difficulty swallowing with aspiration.  There was a question of whether or not he had Parkinson's.  He was seen by Dr. Ángel Hill.  He was found to have a bit of a shuffling gait.  He has no tremor though.  He has noticed that his handwriting has gotten smaller.  He did have a dopaminergic brain scan which showed a decrease in dopaminergic staining consistent with early Parkinson's.  He has not been given any treatment.  He has been eating regular food, but he avoids certain foods at that may stick in his upper esophagus.  He has no trouble with jello or applesauce.  He states if he chews chicken or fish he can swallow that well.  He has lost 40 pounds.  He is quite concerned about the weight loss and is wondering whether or not there might be another cause for the weight loss.  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     INTERVAL HISTORY  Haresh  Had urinary obstruction and seen in ED requiring catheter removing 800cc. Catheter remained. He was seen by urology He was on flomax and added dutasteride. Plan Laser  BPH surgery on Aug 9. Today comes to clinic and while drawing blood found to be orthostatic hypotensive with BP99/66 sitting and 82/44 standing. Rapid response called .Give IV saline. Repeat BP lying 125/66 No fever or chill   Was seen by Dr Matt is Cards for orthostasis in July Decreased metoprolol to 25mg each evening.He is not drinking much fluid    PAST MEDICAL HISTORY:  See my note from 04/2021    SOCIAL HISTORY:  See my note from 04/2021     FAMILY HISTORY:  See my note from  04/2021     REVIEW OF SYSTEMS:  A 12-point review of systems is done and is negative, except as in the HPI.     PHYSICAL EXAMINATION: BP (!) 88/51 (BP Location: Right arm, Patient Position: Sitting, Cuff Size: Adult Regular)   Pulse 69   Temp 97.3  F (36.3  C) (Oral)   Resp 16   Ht 1.829 m (6')   Wt 68.8 kg (151 lb 9.6 oz)   SpO2 98%   BMI 20.56 kg/m    pt is orthostatic but feels fine  EYES:  Grossly normal to inspection, no discharge, erythema or icterus.   SKIN:   skin is clear.  No significant rash or abnormal pigmentation.   NEUROLOGIC:  Cranial nerves seem to be intact.  Mentation and speech is appropriate for age. Left foot drop minimal resting tremor  PSYCHIATRIC:  Mentation appears normal.  He does not seem anxious today.   COR NR rate 70  1/6 SAYDA noted at LSB  ABD No organomegaly  RESP Clear to P and A   Urinary catheter in place    Results for PAMELA GRANADOS (MRN [id number]) as of 8/3/2021 10:50   Ref. Range 8/3/2021 10:02   Sodium Latest Ref Range: 133 - 144 mmol/L 136   Potassium Latest Ref Range: 3.4 - 5.3 mmol/L 4.1   Chloride Latest Ref Range: 94 - 109 mmol/L 103   Carbon Dioxide Latest Ref Range: 20 - 32 mmol/L 32   Urea Nitrogen Latest Ref Range: 7 - 30 mg/dL 15   Creatinine Latest Ref Range: 0.66 - 1.25 mg/dL 1.18   GFR Estimate Latest Ref Range: >60 mL/min/1.73m2 61   Calcium Latest Ref Range: 8.5 - 10.1 mg/dL 8.5   Anion Gap Latest Ref Range: 3 - 14 mmol/L 1 (L)   Albumin Latest Ref Range: 3.4 - 5.0 g/dL 3.0  (L)   Protein Total Latest Ref Range: 6.8 - 8.8 g/dL 7.5   Bilirubin Total Latest Ref Range: 0.2 - 1.3 mg/dL 0.5   Alkaline Phosphatase Latest Ref Range: 40 - 150 U/L 162 (H)   ALT Latest Ref Range: 0 - 70 U/L 18   AST Latest Ref Range: 0 - 45 U/L 24   CRP Inflammation Latest Ref Range: 0.0 - 8.0 mg/L 12.8 (H)   Ferritin Latest Ref Range: 26 - 388 ng/mL 174   Iron Latest Ref Range: 35 - 180 ug/dL 117   Iron Binding Cap Latest Ref Range: 240 - 430 ug/dL 208 (L)   Iron Saturation Index Latest Ref Range: 15 - 46 % 56 (H)   Lactate Dehydrogenase Latest Ref Range: 85 - 227 U/L 166   Glucose Latest Ref Range: 70 - 99 mg/dL 174 (H)     EKG NSR 1st degree AV block with old septal infact NSR rate 68    ASSESSMENT:     1.  Myeloproliferative syndrome/MDS. JAL 2 positive  2.  Status post non-myeloablative allo-sib peripheral blood stem cell transplant.   3.  History of chronic wwfzl-ptgbqo-xkdj disease.   4.  History of Hodgkin's disease.   5.  History of cardiac arrhythmias, SVT and V tach.   6.  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   7.  History of cirrhosis.   8.  History of pulmonary emboli.   9.  History of elevated hemoglobin A1c.   10.  Thyroid nodule.    11.  Cholelithiasis.     12.  Diverticulosis.   13.  Anticoagulation.     14.  Status post bilateral knee replacement.     15.  Aortic stenosis  16. 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  17. Seborrheic keratosis of the back.  18 Weight loss  19.Dysphagia  20. Parkinson'  21 Anorexia  22. BPH  With obstruction  23. Orthostatic hypotension  24.Foot drop Left     ASSESSMENT:    I think the primary cause for his orthostasis is the flomax and the decreased fluid intake. I gave him 600cc IV saline and BP normalize is decreased caloric intake..Will have him stop the flomax Plan surgery for BPH Will hold warfarin for 5 days and then resume after surgery The parkinsons has been quite devastating.  Has sl tremor and hand writing deterioration has been a problem.Worried about depression.  RTC in 4 months  I spent a total of 30  minutes face to face with Haresh Rock during today's office visit. Over 50% of this time was spent counseling the patient and coordinating the care regarding weight loss parkinson's and dysphagia    Augusto Miller MD   [phone]                      Again, thank you for allowing me to participate in the care of your patient.        Sincerely,        Augusto Miller MD

## 2021-08-03 NOTE — PROGRESS NOTES
VIDEO VISIT    Date of Visit: August 10, 2021  Name: Haresh Rock  Date of Birth 1950    [address] [place] [id number] [phone] (M)  [phone] (H)  [email or web address]  Has mychart  proxy - sheyla Ramirez - son  Gemma - daughter  Sheyla Rock  [phone]     [email or web address],   [email or web address]  [email or web address]    [phone]    Sitting in a lazy boy    Assessment:  (G20) Parkinson disease (H)  (primary encounter diagnosis)  Dopamine scan - (datscan)  11/3/2020  A presynaptic dopaminergic deficit is demonstrated bilaterally.    Gait/Med related complications/Falls     Exercise/Therapy     Cognitive/Driving     Mood       Daughter with bipolar  Son with mood/ocd/eating    Hallucinations/delusions     Sleep  Pseudoeph-doxylamine DM APAP nyquil     Bladder  Renal function.   May have stage 2 disease.      Prostate - denies problems. He has has nocturia couple times per night.   Denies elevated psa    Dutasteride avodart 0.5mg - daily  Tamsulosin flomax 0.4mg - stopped  Had low blood pressure and fluids    July 5th ED visit and got catheter  Surgery yesterday laser and should get catheter out  Dr Jose G Hawley     GI/Constipation/GERD  Swallow study 9/22/2020  1. Multiple events of deep laryngeal penetration with thinner barium consistencies with intermittent aspiration.      Gallstones but has not gallbladder removed.  Had a gallbladder attack x 1 and is not on actigall    Bisacodyl dulcolax 5mg EC tablet  Calcium carbonate tums 500mg  Nutritional supplement - 1 bottle per day   Pantoprazole protonix 20mg - not using  Polythyelene glycol miralax - encouraged to use   Prevident 5000 dry mouth - using  Senna-docusate senexon-S senokot-S 2 tabs per day      ENDO  Cholesterol  simvastatin 10mg/day  Vitamin D3 cholecalciferol 50 mcg 2000 units  Had diabetes in the past from prednisone  And this may have resolved  May have a thyroid nodule.  TSH was normal.      Cardio/heart  Flecainide tambocor 50mg  twice daily  Metoprolol succinate ER Toprol XL 25mg 24 hr tablet     Vision     Heme    Folic acid folvite 1mg    Anticoagulated  Warfarin    Hereditary hemochromatosis gene - gene that increases his risk and has not had problems with this. Had had phlebotomies x 2 in the past.      AYUSH Torres     History of pulmonary embolii - back in 2004. This was related to blood clotting problems.   May have had a DVT. Reportedly has had antiphospholipid antibody. He is on coumadin.      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 Had a transplant from Dr. 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  No problems since then.     Graft vs host    He has high sed rate and will be seeing ID next week   crp was 12.8  Sed rate was 90  8/3/2021     Other:     Hodgkin's lymphoma in the past 2011 and had chemotherapy for this and followed with CT scan and reportedly this is gone.      Pulmonary hypertension in the past.     Fluticasone flonase 50 mcg/act nasal spray  Loratadine claritin 10mg      msk - bilateral knee replacements.     Eye - left eye has been poor since age 2 due to a lazy eye - amblyopia  Right eye post cataract surgery had an artificial lens put in and does not wear glasses till the evening      Hearing. Feels he has wax in his left ear and partial problem with the right ear. It is variable problem. Has used ear wax removal.      He reports a neuropathy      Medications                 Acetaminophen tylenol 500mg As needed            Amoxicillin amoxil 500mg For dentist           Bisacodyl dulcolax 5mg EC tablet        Calcium carbonate tums 500mg As needed           Cephalexin keflex 750 Brief course       Docusate colace 50mg As needed       Dutasteride avodart 0.5mg 1       Flecainide tambocor 50mg 1     1     Folic acid folvite 1mg stopped           Fluticasone flonase 50 mcg/act nasal spray Not using           Loratadine claritin 10mg  Not taking            Metoprolol succinate ER Toprol XL 25mg 24 hr tablet      1     MVI centrum variable           Nutritional supplement - ensure 1 bottle           Pantoprazole protonix 20mg Not taking            Polythyelene glycol  miralax Not using          Prevident 5000 dry mouth using           Pseudoeph-doxylamine DM APAP nyquil Not taking usually           Senna-docusate 8.6-50 2           Simvastatin zocor 10mg       1     Tamsulosin flomax 0.4mg stopped       Vitamin D3 cholecalciferol 50 mcg 2000 units        Warfarin anticoagulant coumadin 2.5mg 5 days - 1 tab; 1/2 tab m/f                              Plan:    Ongoing blood pressure issues - has normalized lately with a reduction in his metoprolol long acting 25mg dose and flecainide. 129/67 wrist bp reading today    If we are going to try carbidopa/levodopa Sinemet  Would need him to monitor his blood pressure as sinemet can reduce blood pressure and may need 1/2 the amount of metoprolol.     Sinemet Rx sent but would wait on starting this medication.  Would benefit from Leslie Arredondo's input at some point.    He also has ongoing sed rate issues and will be seeing infectious disease next week  Not sure if there were rectal issues causing elevation sed rate or if someone has considered polymyalgia rheumatica, etc vs chronic infection. Consider visiting with rheumatology besides the econsult.    From their consultation --- Recommendations: I recommend consulting with gastroenterology and/or colorectal surgery regarding the PET appearance. Consultation with infectious disease may also be warranted, given the pulmonary abnormalities noted on PET I recommend obtaining urinalysis. If malignancy or infection is not suggested by the PET scan findings, and if weight loss continues, consider temporal artery biopsy.      Would defer to pcp in regards to considering this depending on workup and others input.     Will be seeing Dr. Coon next week in regards to his  "neuropathy    Will be getting his urinary catheter removed shortly as he had a prostate procedure.    Return back in 3 months or so.     Medical Decision Making:  #  Chronic progressive medical conditions addressed  Id, sed rate  Review and/or interpretation of unique test or documentation from a provider outside of neurology yes   Independent historian provided additional details  yes   Prescription drug management and review of potential side effects and/or monitoring for side effects  yes   Health impacted by social determinants of health  yes    I have reviewed the note as documented above.  This accurately captures the substance of my conversation with the patient and total time spent preparing for visit, executing visit and completing visit on the day of the visit:  30 minutes.     Start of video visit: 940am  End of video visit: 1011am    Ángel Hill MD      ------------------------------------------------------------------------------------------------------------------------------------------------------------------------    Video-Visit Details    The patient has been notified of following:     \"After a review of the patient s situation, this visit was changed from an in-person visit to a video visit to reduce the risk of COVID-19 exposure.   The patient is being evaluated via a billable video visit.\"    \"This video visit will be conducted via a call between you and your physician/provider. We have found that certain health care needs can be provided without the need for an in-person physical exam.  This service lets us provide the care you need with a video conversation.  If a prescription is necessary we can send it directly to your pharmacy.  If lab work is needed we can place an order for that and you can then stop by our lab to have the test done at a later time.    If during the course of the call the physician/provider feels a video visit is not appropriate, you will not be charged for this service.\" "     Patient has given verbal consent for Video visit? Yes    Patient would like the video invitation sent by:     Type of service:  Video Visit    Video Start Time:     Video End Time (time video stopped):     Duration:  minutes - see above    Originating Location (pt. Location):     Distant Location (provider location):  [facility] [place]     Mode of Communication:  Video Conference via [employer] (and if not possible then doximity)      Ángel Hill MD      --------------------------------------------------------------------------------------------------------------    Haresh Rock is a 71 year old male who is being evaluated via a billable video visit.      Charts reviewed  Consult from  Images reviewed        I have reviewed and updated the patient's Past Medical History, Social History, Family History and Medication List.    ALLERGIES  Seasonal allergies    Lasts visit details if there was a last visit:       14 Review of systems  are negative except for   Patient Active Problem List   Diagnosis     Hemochromatosis     Vcsdj-prflpb-qgru disease, chronic (H)     Advanced directives, counseling/discussion     Polyneuropathy     Status post total knee replacements     Status post bone marrow transplant (H)     Hyperlipidemia with target LDL less than 100     Atrial fibrillation (H)     Chronic rhinitis     Gallstones without obstruction of gallbladder     Long-term (current) use of anticoagulants [Z79.01]     Thyroid nodule     Type 2 diabetes mellitus with stage 2 chronic kidney disease, without long-term current use of insulin (H)     Renal hypertension     History of Hodgkin's lymphoma     History of irradiation, presenting hazards to health     Primary pulmonary hypertension (H)     Hereditary hemochromatosis (H)     Oropharyngeal dysphagia     Articulation disorder     Personal history of PE (pulmonary embolism)     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)     Parkinson disease  (H)     Abnormal dopamine scan (DaTSCAN) 2020     ACP (advance care planning)     Thrombocytopenia (H)     Elevated sed rate (elev SR)     Paroxysmal atrial fibrillation (H)     Benign prostatic hyperplasia with urinary retention     Constipation        Allergies   Allergen Reactions     Seasonal Allergies      Past Surgical History:   Procedure Laterality Date     ANESTHESIA CARDIOVERSION  4/21/2011    Procedure:ANESTHESIA CARDIOVERSION; Surgeon:GENERIC ANESTHESIA PROVIDER; Location:[facility] OR     ARTHROSCOPY KNEE RT/LT      LT     CATARACT IOL, RT/LT  2017     COLONOSCOPY  10/16/2012    Procedure: COLONOSCOPY;  Colonoscopy, screening;  Surgeon: Reg Feliciano MD;  Location: [facility] OR     COLONOSCOPY N/A 4/14/2021    Procedure: COLONOSCOPY;  Surgeon: Reg Holm MD;  Location: [facility] GI     ESOPHAGOSCOPY, GASTROSCOPY, DUODENOSCOPY (EGD), COMBINED N/A 10/7/2020    Procedure: ESOPHAGOGASTRODUODENOSCOPY, WITH BIOPSY;  Surgeon: Raul Sawyer DO;  Location: [facility] OR     H ABLATION FOCAL AFIB  3/5/2013    PVI     H ABLATION FOCAL AFIB  01/01/2018     HC BONE MARROW/STEM TRANSPLANT ALLOGENIC  5/8/2007     INSERT PORT VASCULAR ACCESS       JOINT REPLACEMTN, KNEE RT/LT  3/28/12    SHAWN     VASECTOMY  1990     Past Medical History:   Diagnosis Date     Abnormal dopamine scan (DaTSCAN) 2020 11/04/2020    [phone] 11/3/2020   Narrative & Impression   Examination: Nuclear medicine DATscan for Dopamine Receptor Localization.   Examination: NM Brain Image Tomographic (SPECT) DATscan   Date: 11/3/2020 3:21 PM   Indication: Parkinsonism   Comparison: None   Additional Information: none   Interfering Medications: None   Technique:   The patient initially received 1 ml of Lugol's solution orally prior     Antiphospholipid antibody syndrome (H) 2005    Ravi's     Antiphospholipid antibody syndrome (H)     Ravi's, noted negative per testing re-done in 2008 in hematology note 01/13/2009     Articulation disorder 09/23/2020      Atrial fibrillation (H) 04/2011     Benign prostatic hyperplasia with urinary retention      Cirrhosis (H)      CKD (chronic kidney disease) stage 2, GFR 60-89 ml/min      Diabetes mellitus type II     steroid induced     DJD (degenerative joint disease) of knee     SHAWN, worse on right     DVT (deep venous thrombosis) (H)      ED (erectile dysfunction)      Gallbladder polyp 05/2010     Lmkux-Ybimbm-Obnt Disease 2007    BMT     Hemochromatosis      Hodgkin's disease NOS     5 cycles of ABVD in 2011     HTN (hypertension)      Hyperlipidaemia LDL goal <100      Multiple thyroid nodules     benign      Myeloproliferative disorder (H)     atypical, [facility]     Parkinson disease (H) 09/24/2020     PE (pulmonary embolism) 11/2004     Polyneuropathy      Primary pulmonary hypertension (H)      Thrombocytopenia (H) 06/08/2021     Social History     Socioeconomic History     Marital status:      Spouse name: Angelica     Number of children: 2     Years of education: 21     Highest education level: Not on file   Occupational History     Occupation:      Employer: [employer]     Employer: [employer]   Tobacco Use     Smoking status: Never Smoker     Smokeless tobacco: Never Used   Substance and Sexual Activity     Alcohol use: No     Drug use: No     Sexual activity: Not Currently     Partners: Female   Other Topics Concern     Parent/sibling w/ CABG, MI or angioplasty before 65F 55M? No   Social History Narrative     about 50 yrs        Wife has some health issues - back pain - second knee replaced    She had gastric bypass and a fib and bad mitral valve        Son in [place]    Daughter in [facility] with 8 yo son.         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[employer]        Born and raised in [facility]              Social Determinants of Health     Financial Resource Strain:      Difficulty of Paying Living Expenses:    Food Insecurity:      Worried About Running Out of Food in the  Last Year:      Ran Out of Food in the Last Year:    Transportation Needs:      Lack of Transportation (Medical):      Lack of Transportation (Non-Medical):    Physical Activity:      Days of Exercise per Week:      Minutes of Exercise per Session:    Stress:      Feeling of Stress :    Social Connections:      Frequency of Communication with Friends and Family:      Frequency of Social Gatherings with Friends and Family:      Attends [employer] Services:      Active Member of Clubs or Organizations:      Attends Club or Organization Meetings:      Marital Status:    Intimate Partner Violence:      Fear of Current or Ex-Partner:      Emotionally Abused:      Physically Abused:      Sexually Abused:      Family History   Problem Relation Age of Onset     Hypertension Mother      Cerebrovascular Disease Mother      Breast Cancer Mother      Arrhythmia Brother      Arrhythmia Sister         supraventricular tachycardia     Thyroid Disease Sister      Other - See Comments Son         lives in [place]. no kids and not .     Obsessive Compulsive Disorder Son      Depression Son      Eating Disorder Son         eats when depressed     Obesity Son      Thyroid Cancer Daughter         had thyroid removed     Bipolar Disorder Daughter      Other - See Comments Daughter         lives in [place] - single with one son 9  yrs     Other - See Comments Sister      Alzheimer Disease Father      Diabetes Paternal Aunt      Gastrointestinal Disease Maternal Grandmother         colitis      Parkinsonism Other         2 cousins with parkinson     C.A.D. No family hx of      Current Outpatient Medications   Medication Sig Dispense Refill     acetaminophen (TYLENOL) 500 MG tablet 500mg tab as needed       amoxicillin (AMOXIL) 500 MG capsule 4 x 500mg tabs by mouth 1 hour before dental appointments.       bisacodyl (DULCOLAX) 5 MG EC tablet Refer to instructions sent via [email or web address]. 4 tablet 0     calcium carbonate (TUMS)  500 MG chewable tablet 1 tab by mouth in morning or at night as needed       docusate sodium (COLACE) 50 MG capsule Take 1 capsule (50 mg) by mouth 2 times daily as needed for constipation 30 capsule 0     dutasteride (AVODART) 0.5 MG capsule Take 1 capsule (0.5 mg) by mouth daily 90 capsule 3     flecainide (TAMBOCOR) 50 MG tablet Take 1 tablet (50 mg) by mouth 2 times daily 180 tablet 2     fluticasone (FLONASE) 50 MCG/ACT nasal spray Spray 1 spray into both nostrils daily as needed for rhinitis 16 g 11     loratadine (CLARITIN) 10 MG tablet Take 10 mg by mouth daily as needed        metoprolol succinate ER (TOPROL XL) 25 MG 24 hr tablet Take 1 tablet (25 mg) by mouth daily In the evening 90 tablet 3     multivitamin (CENTRUM SILVER) tablet Take 1 tablet by mouth daily       Nutritional Supplements (ENSURE PO)        polyethylene glycol (MIRALAX) 17 g packet Take 1 packet by mouth daily as needed for constipation       PREVIDENT 5000 DRY MOUTH 1.1 % GEL topical gel Take by mouth daily  3     senna-docusate (SENOKOT-S/PERICOLACE) 8.6-50 MG tablet Take 2 tablets by mouth daily       simvastatin (ZOCOR) 10 MG tablet Take 1 tablet (10 mg) by mouth daily 90 tablet 3     tamsulosin (FLOMAX) 0.4 MG capsule Take 1 capsule (0.4 mg) by mouth daily 90 capsule 3     vitamin D3 (CHOLECALCIFEROL) 50 mcg (2000 units) tablet Take 1 tablet (50 mcg) by mouth daily       warfarin ANTICOAGULANT (COUMADIN) 2.5 MG tablet TAKE 1.25 MG (1/2 TABLET) MON/FRI AND 2.5 MG (1 TABLET) ALL OTHER DAYS. NEED APPT. (Patient taking differently: Taking 2.5mg daily. Fridays 3.75mg) 90 tablet 0         Medications

## 2021-08-03 NOTE — NURSING NOTE
Oncology Rooming Note    August 3, 2021 10:42 AM   Haresh Rock is a 71 year old male who presents for:    Chief Complaint   Patient presents with     Blood Draw     Labs drawn via  by RN in lab. VS taken.      RECHECK     Return: Hodgkin's disease of extranodal or solid organ site     Initial Vitals: BP (!) 88/51 (BP Location: Right arm, Patient Position: Sitting, Cuff Size: Adult Regular)   Pulse 69   Temp 97.3  F (36.3  C) (Oral)   Resp 16   Ht 1.829 m (6')   Wt 68.8 kg (151 lb 9.6 oz)   SpO2 98%   BMI 20.56 kg/m   Estimated body mass index is 20.56 kg/m  as calculated from the following:    Height as of this encounter: 1.829 m (6').    Weight as of this encounter: 68.8 kg (151 lb 9.6 oz). Body surface area is 1.87 meters squared.  No Pain (0) Comment: Data Unavailable   No LMP for male patient.  Allergies reviewed: Yes  Medications reviewed: Yes    Medications: Medication refills not needed today.  Pharmacy name entered into [employer]:    [id number] - [place], [place] - [address] [id number] - [facility], [place] - [address] [facility] [address] [facility] [address]    Clinical concerns: N/A       Marci Cisneros CMA

## 2021-08-04 ENCOUNTER — OFFICE VISIT (OUTPATIENT)
Dept: FAMILY MEDICINE | Facility: CLINIC | Age: 71
End: 2021-08-04
Payer: COMMERCIAL

## 2021-08-04 VITALS
HEART RATE: 72 BPM | RESPIRATION RATE: 19 BRPM | DIASTOLIC BLOOD PRESSURE: 70 MMHG | HEIGHT: 72 IN | SYSTOLIC BLOOD PRESSURE: 114 MMHG | BODY MASS INDEX: 20.88 KG/M2 | WEIGHT: 154.2 LBS | OXYGEN SATURATION: 99 %

## 2021-08-04 DIAGNOSIS — D68.61 ANTIPHOSPHOLIPID SYNDROME (H): ICD-10-CM

## 2021-08-04 DIAGNOSIS — Z79.01 CHRONIC ANTICOAGULATION: ICD-10-CM

## 2021-08-04 DIAGNOSIS — G20.A1 PARKINSON DISEASE (H): ICD-10-CM

## 2021-08-04 DIAGNOSIS — R70.0 ELEVATED ERYTHROCYTE SEDIMENTATION RATE: ICD-10-CM

## 2021-08-04 DIAGNOSIS — R33.8 HYPERTROPHY OF PROSTATE WITH URINARY RETENTION: ICD-10-CM

## 2021-08-04 DIAGNOSIS — N40.1 HYPERTROPHY OF PROSTATE WITH URINARY RETENTION: ICD-10-CM

## 2021-08-04 DIAGNOSIS — I48.20 CHRONIC ATRIAL FIBRILLATION (H): ICD-10-CM

## 2021-08-04 DIAGNOSIS — Z01.818 PRE-OPERATIVE GENERAL PHYSICAL EXAMINATION: Primary | ICD-10-CM

## 2021-08-04 DIAGNOSIS — Z85.71 HISTORY OF HODGKIN'S DISEASE: ICD-10-CM

## 2021-08-04 LAB
ATRIAL RATE - MUSE: 68 BPM
DIASTOLIC BLOOD PRESSURE - MUSE: NORMAL MMHG
INTERPRETATION ECG - MUSE: NORMAL
P AXIS - MUSE: 71 DEGREES
PR INTERVAL - MUSE: 222 MS
QRS DURATION - MUSE: 86 MS
QT - MUSE: 388 MS
QTC - MUSE: 412 MS
R AXIS - MUSE: 25 DEGREES
SYSTOLIC BLOOD PRESSURE - MUSE: NORMAL MMHG
T AXIS - MUSE: 56 DEGREES
VENTRICULAR RATE- MUSE: 68 BPM

## 2021-08-04 PROCEDURE — 99214 OFFICE O/P EST MOD 30 MIN: CPT | Performed by: FAMILY MEDICINE

## 2021-08-04 ASSESSMENT — PAIN SCALES - GENERAL: PAINLEVEL: NO PAIN (0)

## 2021-08-04 ASSESSMENT — MIFFLIN-ST. JEOR: SCORE: 1482.45

## 2021-08-04 NOTE — PROGRESS NOTES
[facility]  [address] [address]  Phone: [phone]  Primary Provider: Anya Nowak  Pre-op Performing Provider: BRAD LOFTON      PREOPERATIVE EVALUATION:  Today's date: 8/4/2021    Haresh Rock is a 71 year old male who presents for a preoperative evaluation.    Surgical Information:  Surgery/Procedure: Prostate turp   Surgery Location: [facility]   Surgeon: Dr. Hawley   Surgery Date: 8/9/2021  Time of Surgery: 10:00am   Where patient plans to recover: At home with family  Fax number for surgical facility: Note does not need to be faxed, will be available electronically in Epic.    Type of Anesthesia Anticipated: General    Assessment & Plan     The proposed surgical procedure is considered LOW risk.    Pre-operative general physical examination    TURP due to BPH with urinary retention,    Hypertrophy of prostate with urinary retention    Has in dwelling catheter    - due for TURP    Elevated ESR:    -  On going, temporal arteritis was considered as a differential and been discussion whether can have biopsy when has procedure. He has appointment with ID jessi platt on 8/18/21; discussed this with patient and given been going on for a while and not had any new visual changes, would like to speak ID first    Chronic atrial fibrillation (H)/Chronic anticoagulation     -  Well controlled on flecainide and on anticoagulation as well    Antiphospholipid syndrome (H)    -  On Warfarin; will be held temporarily    History of Hodgkin's disease   -  In remission    Parkinson disease (H)   -  Symptoms not debilitating    Risks and Recommendations:  The patient has the following additional risks and recommendations for perioperative complications:  Anemia/Bleeding/Clotting:    - History of DVT or PE, consider DVT prevention postoperatively    Medication Instructions:   - warfarin: Thomboembolic risk is high (>10%/year). HOLD warfarin 5 days. -  Bridging not done due to risk outweighing benefits     RECOMMENDATION:  APPROVAL GIVEN to proceed with proposed procedure, without further diagnostic evaluation.  [id number]}    Subjective     HPI related to upcoming procedure:  TURP    Preop Questions 8/3/2021   1. Have you ever had a heart attack or stroke? No   2. Have you ever had surgery on your heart or blood vessels, such as a stent placement, a coronary artery bypass, or surgery on an artery in your head, neck, heart, or legs? YES - 2 ablations last about 3 yrs ago   3. Do you have chest pain with activity? No   4. Do you have a history of  heart failure? No   5. Do you currently have a cold, bronchitis or symptoms of other infection? No   6. Do you have a cough, shortness of breath, or wheezing? No   7. Do you or anyone in your family have previous history of blood clots? YES - 2004 for PE; had myeloproliferative dx and bone transplant in 2007.   8. Do you or does anyone in your family have a serious bleeding problem such as prolonged bleeding following surgeries or cuts? No   9. Have you ever had problems with anemia or been told to take iron pills? No   10. Have you had any abnormal blood loss such as black, tarry or bloody stools? No   11. Have you ever had a blood transfusion? YES - 2007   11a. Have you ever had a transfusion reaction? No   12. Are you willing to have a blood transfusion if it is medically needed before, during, or after your surgery? Yes   13. Have you or any of your relatives ever had problems with anesthesia? No   14. Do you have sleep apnea, excessive snoring or daytime drowsiness? No   15. Do you have any artifical heart valves or other implanted medical devices like a pacemaker, defibrillator, or continuous glucose monitor? No   16. Do you have artificial joints? YES -  Both knees   17. Are you allergic to latex? No       Health Care Directive:  Patient has a Health Care Directive on file      Preoperative Review of :   reviewed -  no record of controlled substances prescribed.      Status of Chronic Conditions:  See problem list for active medical problems.  Problems all longstanding and stable, except as noted/documented.  See ROS for pertinent symptoms related to these conditions.      Review of Systems  Constitutional, neuro, ENT, endocrine, pulmonary, cardiac, gastrointestinal, genitourinary, musculoskeletal, integument and psychiatric systems are negative, except as otherwise noted.    Patient Active Problem List    Diagnosis Date Noted     Atrial fibrillation (H) 03/04/2013     Priority: High     Hyperlipidemia with target LDL less than 100 09/11/2012     Priority: High     Status post bone marrow transplant (H) 08/29/2012     Priority: High     Polyneuropathy      Priority: High     Ddptd-tbsjax-ekts disease, chronic (H) 05/21/2011     Priority: High     Hemochromatosis 06/02/2010     Priority: High     Constipation 07/27/2021     Priority: Medium     Benign prostatic hyperplasia with urinary retention      Priority: Medium     Paroxysmal atrial fibrillation (H) 06/18/2021     Priority: Medium     Thrombocytopenia (H) 06/08/2021     Priority: Medium     Elevated sed rate (elev SR) 06/08/2021     Priority: Medium     Abnormal dopamine scan (DaTSCAN) 2020 11/04/2020     Priority: Medium     [phone] 11/3/2020       Narrative & Impression     Examination: Nuclear medicine DATscan for Dopamine Receptor  Localization.    Examination: NM Brain Image Tomographic (SPECT) DATscan     Date: 11/3/2020 3:21 PM     Indication: Parkinsonism     Comparison: None     Additional Information: none     Interfering Medications: None     Technique:     The patient initially received 1 ml of Lugol's solution orally prior  to the injection of 4.77 mCi of I-123 Ioflupane intravenously.     After 3 hours of uptake time the patient was imaged on a dual headed  SPECT scanner using LOREN collimators. The patients head was  immobilized prior to scanning to  reduce motion. The head was scanned  with a FOV of 15 cm at 3 degrees per stop over 360 degrees, 128 x 128  x 16 bit resolution, at 30 seconds per stop to yield greater than 1.5  million counts.     Images were reconstructed using a iterative reconstruction technique.  Semi-quantitative analysis was performed as a ratio of Putamen to  Caudate Nucleus of the right and left brain when the Caudate uptake  appeared normal.     2. Technical Quality: Excellent     Subjective findings:     Relatively symmetric radiotracer uptake to the basal ganglia. There is  decreased uptake to putamina bilaterally.     Ratio Semi-quantitative analysis to Normalized (Occipital) Region:  (Ratios of activity in Caudate, Putamen and Striatum (combined)  normalized to background region (occipital cortex) and compared  against an age matched normal group.     Right Caudate  ratio: 2.1,  Right Putamen: 2.0, Right Striatum: 2.1  Left Caudate  ratio: 2.2, Left Putamen: 1.8, Left Striatum: 2.1     Normal ratios:  (normal range is 2.5-8)                                                                      Impression:  A presynaptic dopaminergic deficit is demonstrated bilaterally.        I have personally reviewed the examination and initial interpretation  and I agree with the findings.     LEONARDO HENDRICKS MD            Parkinson disease (H) 09/24/2020     Priority: Medium     Articulation disorder 09/23/2020     Priority: Medium     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) 09/23/2020     Priority: Medium     Overview:   from iron overload    Formatting of this note might be different from the original.  from iron overload       Oropharyngeal dysphagia 09/12/2020     Priority: Medium     Hereditary hemochromatosis (H) 07/06/2020     Priority: Medium     Primary pulmonary hypertension (H)      Priority: Medium     History of Hodgkin's lymphoma 03/24/2017     Priority: Medium     History of irradiation, presenting hazards to health 03/24/2017      Priority: Medium     Type 2 diabetes mellitus with stage 2 chronic kidney disease, without long-term current use of insulin (H) 09/20/2016     Priority: Medium     Renal hypertension 09/20/2016     Priority: Medium     Thyroid nodule 05/17/2016     Priority: Medium     Long-term (current) use of anticoagulants [Z79.01] 04/19/2016     Priority: Medium     Chronic rhinitis 07/09/2014     Priority: Medium     Gallstones without obstruction of gallbladder 07/09/2014     Priority: Medium     ACP (advance care planning) 03/29/2012     Priority: Medium     Formatting of this note is different from the original.  Patient has identified Health Care Agent(s): Yes  Add Health Care Agents: Yes    Health Care Agent(s):  Primary Health Care Agent: sheyla Relationship: wife Phone:    Secondary Health Care Agent:  Relationship:  Phone:    Conservator:  Relationship:  Phone:    Guardian: Relationship:  Phone:      Patient has Advance Care Plan Documents (Health Care Directive, POLST): No, .    Patient has identified Specific Treatment Preferences: Yes   Specific Treatment Preferences: a.) Code Status:  CPR/Attempt Resuscitation       Personal history of PE (pulmonary embolism) 03/28/2012     Priority: Medium     Status post total knee replacements 04/24/2012     Priority: Low     Dr. Eber Toth, [facility]       Advanced directives, counseling/discussion 03/21/2012     Priority: Low     Living will on file 1/11/2018        Past Medical History:   Diagnosis Date     Abnormal dopamine scan (DaTSCAN) 2020 11/04/2020    [phone] 11/3/2020   Narrative & Impression   Examination: Nuclear medicine DATscan for Dopamine Receptor Localization.   Examination: NM Brain Image Tomographic (SPECT) DATscan   Date: 11/3/2020 3:21 PM   Indication: Parkinsonism   Comparison: None   Additional Information: none   Interfering Medications: None   Technique:   The patient initially received 1 ml of Lugol's solution orally prior      Antiphospholipid antibody syndrome (H) 2005    Ravi's     Antiphospholipid antibody syndrome (H)     Ravi's, noted negative per testing re-done in 2008 in hematology note 01/13/2009     Articulation disorder 09/23/2020     Atrial fibrillation (H) 04/2011     Benign prostatic hyperplasia with urinary retention      Cirrhosis (H)      CKD (chronic kidney disease) stage 2, GFR 60-89 ml/min      Diabetes mellitus type II     steroid induced     DJD (degenerative joint disease) of knee     SHAWN, worse on right     DVT (deep venous thrombosis) (H)      ED (erectile dysfunction)      Gallbladder polyp 05/2010     Tfjed-Ljshfi-Wpkc Disease 2007    BMT     Hemochromatosis      Hodgkin's disease NOS     5 cycles of ABVD in 2011     HTN (hypertension)      Hyperlipidaemia LDL goal <100      Multiple thyroid nodules     benign      Myeloproliferative disorder (H)     atypical, [facility]     Parkinson disease (H) 09/24/2020     PE (pulmonary embolism) 11/2004     Polyneuropathy      Primary pulmonary hypertension (H)      Thrombocytopenia (H) 06/08/2021     Past Surgical History:   Procedure Laterality Date     ANESTHESIA CARDIOVERSION  4/21/2011    Procedure:ANESTHESIA CARDIOVERSION; Surgeon:GENERIC ANESTHESIA PROVIDER; Location:[facility] OR     ARTHROSCOPY KNEE RT/LT      LT     CATARACT IOL, RT/LT  2017     COLONOSCOPY  10/16/2012    Procedure: COLONOSCOPY;  Colonoscopy, screening;  Surgeon: Reg Feliciano MD;  Location: [facility] OR     COLONOSCOPY N/A 4/14/2021    Procedure: COLONOSCOPY;  Surgeon: Reg Holm MD;  Location: [facility] GI     ESOPHAGOSCOPY, GASTROSCOPY, DUODENOSCOPY (EGD), COMBINED N/A 10/7/2020    Procedure: ESOPHAGOGASTRODUODENOSCOPY, WITH BIOPSY;  Surgeon: Raul Sawyer DO;  Location: [facility] OR     H ABLATION FOCAL AFIB  3/5/2013    PVI     H ABLATION FOCAL AFIB  01/01/2018     HC BONE MARROW/STEM TRANSPLANT ALLOGENIC  5/8/2007     INSERT PORT VASCULAR ACCESS       JOINT REPLACEMTN, KNEE RT/LT  3/28/12    SHAWN      VASECTOMY  1990     Current Outpatient Medications   Medication Sig Dispense Refill     acetaminophen (TYLENOL) 500 MG tablet 500mg tab as needed       amoxicillin (AMOXIL) 500 MG capsule 4 x 500mg tabs by mouth 1 hour before dental appointments.       bisacodyl (DULCOLAX) 5 MG EC tablet Refer to instructions sent via [employer]. 4 tablet 0     calcium carbonate (TUMS) 500 MG chewable tablet 1 tab by mouth in morning or at night as needed       docusate sodium (COLACE) 50 MG capsule Take 1 capsule (50 mg) by mouth 2 times daily as needed for constipation 30 capsule 0     dutasteride (AVODART) 0.5 MG capsule Take 1 capsule (0.5 mg) by mouth daily 90 capsule 3     flecainide (TAMBOCOR) 50 MG tablet Take 1 tablet (50 mg) by mouth 2 times daily 180 tablet 2     fluticasone (FLONASE) 50 MCG/ACT nasal spray Spray 1 spray into both nostrils daily as needed for rhinitis 16 g 11     loratadine (CLARITIN) 10 MG tablet Take 10 mg by mouth daily as needed        metoprolol succinate ER (TOPROL XL) 25 MG 24 hr tablet Take 1 tablet (25 mg) by mouth daily In the evening 90 tablet 3     multivitamin (CENTRUM SILVER) tablet Take 1 tablet by mouth daily       Nutritional Supplements (ENSURE PO)        polyethylene glycol (MIRALAX) 17 g packet Take 1 packet by mouth daily as needed for constipation       PREVIDENT 5000 DRY MOUTH 1.1 % GEL topical gel Take by mouth daily  3     senna-docusate (SENOKOT-S/PERICOLACE) 8.6-50 MG tablet Take 2 tablets by mouth daily       simvastatin (ZOCOR) 10 MG tablet Take 1 tablet (10 mg) by mouth daily 90 tablet 3     vitamin D3 (CHOLECALCIFEROL) 50 mcg (2000 units) tablet Take 1 tablet (50 mcg) by mouth daily       warfarin ANTICOAGULANT (COUMADIN) 2.5 MG tablet TAKE 1.25 MG (1/2 TABLET) MON/FRI AND 2.5 MG (1 TABLET) ALL OTHER DAYS. NEED APPT. (Patient taking differently: Taking 2.5mg daily. Fridays 3.75mg) 90 tablet 0       Allergies   Allergen Reactions     Seasonal Allergies         Social History      Tobacco Use     Smoking status: Never Smoker     Smokeless tobacco: Never Used   Substance Use Topics     Alcohol use: No     Family History   Problem Relation Age of Onset     Hypertension Mother      Cerebrovascular Disease Mother      Breast Cancer Mother      Arrhythmia Brother      Arrhythmia Sister         supraventricular tachycardia     Thyroid Disease Sister      Other - See Comments Son         lives in [place]. no kids and not .     Obsessive Compulsive Disorder Son      Depression Son      Eating Disorder Son         eats when depressed     Obesity Son      Thyroid Cancer Daughter         had thyroid removed     Bipolar Disorder Daughter      Other - See Comments Daughter         lives in [place] - single with one son 9  yrs     Other - See Comments Sister      Alzheimer Disease Father      Diabetes Paternal Aunt      Gastrointestinal Disease Maternal Grandmother         colitis      Parkinsonism Other         2 cousins with parkinson     C.A.D. No family hx of      History   Drug Use No         Objective     /70 (BP Location: Right arm)   Pulse 72   Resp 19   Ht 1.829 m (6')   Wt 69.9 kg (154 lb 3.2 oz)   SpO2 99%   BMI 20.91 kg/m      Physical Exam    GENERAL APPEARANCE: healthy, alert and no distress     RESP: lungs clear to auscultation - no rales, rhonchi or wheezes     CV: regular rates and rhythm and no murmur, click or rub     MS: extremities normal- no gross deformities noted     SKIN: no suspicious lesions or rashes     NEURO: Normal strength and tone, mild tremor, mentation intact and speech normal     PSYCH: mentation appears normal. and affect normal/bright  Diagnostics:    Recent Labs   Lab Test 08/03/21  1002 07/30/21  0932 07/09/21  0000 07/05/21  1206 05/28/21  0000 05/25/21  1509 01/22/21  0000 01/15/21  0817   HGB 14.9  --   --  13.8   < > 15.2   < > 15.8   *  --   --  136*   < > 144*   < > 142*   INR 2.17* 2.4*   < > 2.87*  --   --   --  3.20*      --   --  136  --  137   < > 139   POTASSIUM 4.1  --   --  3.3*  --  4.3   < > 4.0   CR 1.18  --   --  1.15  --  1.25   < > 1.21   A1C  --   --   --   --   --  5.4  --  5.5    < > = values in this interval not displayed.      EK/3/2021  Sinus rhythm with 1st degree A-V block   Septal infarct , age undetermined     Revised Cardiac Risk Index (RCRI):  The patient has the following serious cardiovascular risks for perioperative complications:   - No serious cardiac risks = 0 points     RCRI Interpretation: 1 point: Class II (low risk - 0.9% complication rate)    Signed Electronically by: Jose Manuel Gallegos MD  Copy of this evaluation report is provided to requesting physician.

## 2021-08-05 ENCOUNTER — HOSPITAL ENCOUNTER (OUTPATIENT)
Dept: PHYSICAL THERAPY | Facility: CLINIC | Age: 71
Setting detail: THERAPIES SERIES
End: 2021-08-05
Attending: PSYCHIATRY & NEUROLOGY
Payer: COMMERCIAL

## 2021-08-05 LAB
ALBUMIN SERPL ELPH-MCNC: 3.3 G/DL (ref 3.7–5.1)
ALPHA1 GLOB SERPL ELPH-MCNC: 0.4 G/DL (ref 0.2–0.4)
ALPHA2 GLOB SERPL ELPH-MCNC: 0.8 G/DL (ref 0.5–0.9)
B-GLOBULIN SERPL ELPH-MCNC: 1 G/DL (ref 0.6–1)
GAMMA GLOB SERPL ELPH-MCNC: 1.4 G/DL (ref 0.7–1.6)
M PROTEIN SERPL ELPH-MCNC: 0 G/DL
PROT PATTERN SERPL ELPH-IMP: ABNORMAL

## 2021-08-05 PROCEDURE — 97112 NEUROMUSCULAR REEDUCATION: CPT | Mod: GP | Performed by: PHYSICAL THERAPIST

## 2021-08-05 PROCEDURE — 84165 PROTEIN E-PHORESIS SERUM: CPT | Mod: 26 | Performed by: PATHOLOGY

## 2021-08-06 ENCOUNTER — TELEPHONE (OUTPATIENT)
Dept: FAMILY MEDICINE | Facility: CLINIC | Age: 71
End: 2021-08-06

## 2021-08-06 NOTE — TELEPHONE ENCOUNTER
[facility] called they need provider to sign off on Pre-op patient's surgery Monday.  Zayra Manjarrez,

## 2021-08-06 NOTE — TELEPHONE ENCOUNTER
The note is signed.     Pre-op dept number: [phone]    Fax: [phone]    Confirmed fax of tracing. Lizzeth Hendrix,

## 2021-08-10 ENCOUNTER — TELEPHONE (OUTPATIENT)
Dept: ANTICOAGULATION | Facility: CLINIC | Age: 71
End: 2021-08-10

## 2021-08-10 ENCOUNTER — TELEPHONE (OUTPATIENT)
Dept: UROLOGY | Facility: CLINIC | Age: 71
End: 2021-08-10

## 2021-08-10 ENCOUNTER — ALLIED HEALTH/NURSE VISIT (OUTPATIENT)
Dept: UROLOGY | Facility: CLINIC | Age: 71
End: 2021-08-10
Payer: COMMERCIAL

## 2021-08-10 ENCOUNTER — VIRTUAL VISIT (OUTPATIENT)
Dept: NEUROLOGY | Facility: CLINIC | Age: 71
End: 2021-08-10
Payer: COMMERCIAL

## 2021-08-10 DIAGNOSIS — G20.A1 PARKINSON DISEASE (H): Primary | ICD-10-CM

## 2021-08-10 DIAGNOSIS — Z79.01 LONG TERM CURRENT USE OF ANTICOAGULANT THERAPY: Primary | ICD-10-CM

## 2021-08-10 DIAGNOSIS — N40.0 BPH (BENIGN PROSTATIC HYPERPLASIA): Primary | ICD-10-CM

## 2021-08-10 DIAGNOSIS — I48.0 PAROXYSMAL ATRIAL FIBRILLATION (H): ICD-10-CM

## 2021-08-10 DIAGNOSIS — R70.0 ELEVATED SED RATE: ICD-10-CM

## 2021-08-10 DIAGNOSIS — I48.91 ATRIAL FIBRILLATION (H): ICD-10-CM

## 2021-08-10 PROCEDURE — 99207 PR NO CHARGE NURSE ONLY: CPT

## 2021-08-10 PROCEDURE — 99214 OFFICE O/P EST MOD 30 MIN: CPT | Mod: 95 | Performed by: PSYCHIATRY & NEUROLOGY

## 2021-08-10 RX ORDER — HYDROCODONE BITARTRATE AND ACETAMINOPHEN 5; 325 MG/1; MG/1
1 TABLET ORAL DAILY
COMMUNITY
Start: 2021-08-09 | End: 2021-08-19

## 2021-08-10 RX ORDER — CEPHALEXIN 750 MG/1
1 CAPSULE ORAL DAILY
Status: ON HOLD | COMMUNITY
Start: 2021-08-09 | End: 2021-08-21

## 2021-08-10 RX ORDER — CARBIDOPA AND LEVODOPA 25; 100 MG/1; MG/1
TABLET ORAL
Qty: 90 TABLET | Refills: 11 | Status: SHIPPED | OUTPATIENT
Start: 2021-08-10 | End: 2021-10-08

## 2021-08-10 NOTE — TELEPHONE ENCOUNTER
ANTICOAGULATION  MANAGEMENT: Discharge Review    Haresh Rock chart reviewed for anticoagulation continuity of care    Outpatient surgery/procedure on TURP for 08/09/2021.    Discharge disposition: Home    Results:    No results for input(s): unable to access records, outside facility, no Care Everywhere access authorized    Anticoagulation inpatient management:     held warfarin due to procedure 08/09/2021, 5 days prior     Anticoagulation discharge instructions:     Warfarin dosing: warfarin discontinued   Bridging: No   INR goal change: No      Medication changes affecting anticoagulation: discharged on cephalexin 750mg QD & Norco PRN    Additional factors affecting anticoagulation: No    Plan     Boost dose to 5mg today at restart & recommend to check INR on 08/13/2021    Spoke with Haresh    Anticoagulation Calendar updated    Xiomara Tello [facility]

## 2021-08-10 NOTE — LETTER
8/10/2021       RE: Haresh Rock  [address] [place] [address]     Dear Colleague,    Thank you for referring your patient, Haresh Rock, to the [facility]. Please see a copy of my visit note below.      VIDEO VISIT    Date of Visit: August 10, 2021  Name: Haresh Rock  Date of Birth 1950    [address] [place] [address] [phone] (M)  [phone] (H)  [email or web address]  Has mychart  proxy - sheyla Ramirez - son  Gemma - daughter  Sheyla Rock  [phone]     [email or web address],   [email or web address]  [email or web address] Number    [phone]    Sitting in a lazy boy    Assessment:  (G20) Parkinson disease (H)  (primary encounter diagnosis)  Dopamine scan - (datscan)  11/3/2020  A presynaptic dopaminergic deficit is demonstrated bilaterally.    Gait/Med related complications/Falls     Exercise/Therapy     Cognitive/Driving     Mood       Daughter with bipolar  Son with mood/ocd/eating    Hallucinations/delusions     Sleep  Pseudoeph-doxylamine DM APAP nyquil     Bladder  Renal function.   May have stage 2 disease.      Prostate - denies problems. He has has nocturia couple times per night.   Denies elevated psa    Dutasteride avodart 0.5mg - daily  Tamsulosin flomax 0.4mg - stopped  Had low blood pressure and fluids    July 5th ED visit and got catheter  Surgery yesterday laser and should get catheter out  Dr Jose G Hawley     GI/Constipation/GERD  Swallow study 9/22/2020  1. Multiple events of deep laryngeal penetration with thinner barium consistencies with intermittent aspiration.      Gallstones but has not gallbladder removed.  Had a gallbladder attack x 1 and is not on actigall    Bisacodyl dulcolax 5mg EC tablet  Calcium carbonate tums 500mg  Nutritional supplement - 1 bottle per day   Pantoprazole protonix 20mg - not using  Polythyelene glycol miralax - encouraged to use   Prevident 5000 dry  mouth - using  Senna-docusate senexon-S senokot-S 2 tabs per day      ENDO  Cholesterol  simvastatin 10mg/day  Vitamin D3 cholecalciferol 50 mcg 2000 units  Had diabetes in the past from prednisone  And this may have resolved  May have a thyroid nodule.  TSH was normal.      Cardio/heart  Flecainide tambocor 50mg twice daily  Metoprolol succinate ER Toprol XL 25mg 24 hr tablet     Vision     Heme    Folic acid folvite 1mg    Anticoagulated  Warfarin    Hereditary hemochromatosis gene - gene that increases his risk and has not had problems with this. Had had phlebotomies x 2 in the past.      AYUSH Torres     History of pulmonary embolii - back in 2004. This was related to blood clotting problems.   May have had a DVT. Reportedly has had antiphospholipid antibody. He is on coumadin.      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 Had a transplant from Dr. 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  No problems since then.     Graft vs host    He has high sed rate and will be seeing ID next week   crp was 12.8  Sed rate was 90  8/3/2021     Other:     Hodgkin's lymphoma in the past 2011 and had chemotherapy for this and followed with CT scan and reportedly this is gone.      Pulmonary hypertension in the past.     Fluticasone flonase 50 mcg/act nasal spray  Loratadine claritin 10mg      msk - bilateral knee replacements.     Eye - left eye has been poor since age 2 due to a lazy eye - amblyopia  Right eye post cataract surgery had an artificial lens put in and does not wear glasses till the evening      Hearing. Feels he has wax in his left ear and partial problem with the right ear. It is variable problem. Has used ear wax removal.      He reports a neuropathy      Medications                 Acetaminophen tylenol 500mg As needed            Amoxicillin amoxil 500mg For dentist           Bisacodyl dulcolax 5mg EC tablet        Calcium carbonate tums 500mg As needed            Cephalexin keflex 750 Brief course       Docusate colace 50mg As needed       Dutasteride avodart 0.5mg 1       Flecainide tambocor 50mg 1     1     Folic acid folvite 1mg stopped           Fluticasone flonase 50 mcg/act nasal spray Not using           Loratadine claritin 10mg  Not taking           Metoprolol succinate ER Toprol XL 25mg 24 hr tablet      1     MVI centrum variable           Nutritional supplement - ensure 1 bottle           Pantoprazole protonix 20mg Not taking            Polythyelene glycol  miralax Not using          Prevident 5000 dry mouth using           Pseudoeph-doxylamine DM APAP nyquil Not taking usually           Senna-docusate 8.6-50 2           Simvastatin zocor 10mg       1     Tamsulosin flomax 0.4mg stopped       Vitamin D3 cholecalciferol 50 mcg 2000 units        Warfarin anticoagulant coumadin 2.5mg 5 days - 1 tab; 1/2 tab m/f                              Plan:    Ongoing blood pressure issues - has normalized lately with a reduction in his metoprolol long acting 25mg dose and flecainide. 129/67 wrist bp reading today    If we are going to try carbidopa/levodopa Sinemet  Would need him to monitor his blood pressure as sinemet can reduce blood pressure and may need 1/2 the amount of metoprolol.     Sinemet Rx sent but would wait on starting this medication.  Would benefit from Leslie Arredondo's input at some point.    He also has ongoing sed rate issues and will be seeing infectious disease next week  Not sure if there were rectal issues causing elevation sed rate or if someone has considered polymyalgia rheumatica, etc vs chronic infection. Consider visiting with rheumatology besides the econsult.    From their consultation --- Recommendations: I recommend consulting with gastroenterology and/or colorectal surgery regarding the PET appearance. Consultation with infectious disease may also be warranted, given the pulmonary abnormalities noted on PET I recommend obtaining  "urinalysis. If malignancy or infection is not suggested by the PET scan findings, and if weight loss continues, consider temporal artery biopsy.      Would defer to pcp in regards to considering this depending on workup and others input.     Will be seeing Dr. Coon next week in regards to his neuropathy    Will be getting his urinary catheter removed shortly as he had a prostate procedure.    Return back in 3 months or so.     Medical Decision Making:  #  Chronic progressive medical conditions addressed  Id, sed rate  Review and/or interpretation of unique test or documentation from a provider outside of neurology yes   Independent historian provided additional details  yes   Prescription drug management and review of potential side effects and/or monitoring for side effects  yes   Health impacted by social determinants of health  yes    I have reviewed the note as documented above.  This accurately captures the substance of my conversation with the patient and total time spent preparing for visit, executing visit and completing visit on the day of the visit:  30 minutes.     Start of video visit: 940am  End of video visit: 1011am    Ángel Hill MD      ------------------------------------------------------------------------------------------------------------------------------------------------------------------------    Video-Visit Details    The patient has been notified of following:     \"After a review of the patient s situation, this visit was changed from an in-person visit to a video visit to reduce the risk of COVID-19 exposure.   The patient is being evaluated via a billable video visit.\"    \"This video visit will be conducted via a call between you and your physician/provider. We have found that certain health care needs can be provided without the need for an in-person physical exam.  This service lets us provide the care you need with a video conversation.  If a prescription is necessary we can " "send it directly to your pharmacy.  If lab work is needed we can place an order for that and you can then stop by our lab to have the test done at a later time.    If during the course of the call the physician/provider feels a video visit is not appropriate, you will not be charged for this service.\"     Patient has given verbal consent for Video visit? Yes    Patient would like the video invitation sent by:     Type of service:  Video Visit    Video Start Time:     Video End Time (time video stopped):     Duration:  minutes - see above    Originating Location (pt. Location):     Distant Location (provider location):  [facility] NEUROLOG[facility] [place]     Mode of Communication:  Video Conference via BranchOut (and if not possible then doximity)      Ángel Hill MD      --------------------------------------------------------------------------------------------------------------    Haresh Rock is a 71 year old male who is being evaluated via a billable video visit.      Charts reviewed  Consult from  Images reviewed        I have reviewed and updated the patient's Past Medical History, Social History, Family History and Medication List.    ALLERGIES  Seasonal allergies    Lasts visit details if there was a last visit:       14 Review of systems  are negative except for   Patient Active Problem List   Diagnosis     Hemochromatosis     Nzsmv-qardcq-esnj disease, chronic (H)     Advanced directives, counseling/discussion     Polyneuropathy     Status post total knee replacements     Status post bone marrow transplant (H)     Hyperlipidemia with target LDL less than 100     Atrial fibrillation (H)     Chronic rhinitis     Gallstones without obstruction of gallbladder     Long-term (current) use of anticoagulants [Z79.01]     Thyroid nodule     Type 2 diabetes mellitus with stage 2 chronic kidney disease, without long-term current use of insulin (H)     Renal hypertension     History of Hodgkin's " lymphoma     History of irradiation, presenting hazards to health     Primary pulmonary hypertension (H)     Hereditary hemochromatosis (H)     Oropharyngeal dysphagia     Articulation disorder     Personal history of PE (pulmonary embolism)     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)     Parkinson disease (H)     Abnormal dopamine scan (DaTSCAN) 2020     ACP (advance care planning)     Thrombocytopenia (H)     Elevated sed rate (elev SR)     Paroxysmal atrial fibrillation (H)     Benign prostatic hyperplasia with urinary retention     Constipation        Allergies   Allergen Reactions     Seasonal Allergies      Past Surgical History:   Procedure Laterality Date     ANESTHESIA CARDIOVERSION  4/21/2011    Procedure:ANESTHESIA CARDIOVERSION; Surgeon:GENERIC ANESTHESIA PROVIDER; Location:[facility] OR     ARTHROSCOPY KNEE RT/LT      LT     CATARACT IOL, RT/LT  2017     COLONOSCOPY  10/16/2012    Procedure: COLONOSCOPY;  Colonoscopy, screening;  Surgeon: Reg Feliciano MD;  Location: [facility] OR     COLONOSCOPY N/A 4/14/2021    Procedure: COLONOSCOPY;  Surgeon: Reg Holm MD;  Location: [facility] GI     ESOPHAGOSCOPY, GASTROSCOPY, DUODENOSCOPY (EGD), COMBINED N/A 10/7/2020    Procedure: ESOPHAGOGASTRODUODENOSCOPY, WITH BIOPSY;  Surgeon: Raul Sawyer DO;  Location: [facility] OR     H ABLATION FOCAL AFIB  3/5/2013    PVI     H ABLATION FOCAL AFIB  01/01/2018     HC BONE MARROW/STEM TRANSPLANT ALLOGENIC  5/8/2007     INSERT PORT VASCULAR ACCESS       JOINT REPLACEMTN, KNEE RT/LT  3/28/12    SHAWN     VASECTOMY  1990     Past Medical History:   Diagnosis Date     Abnormal dopamine scan (DaTSCAN) 2020 11/04/2020    [phone] 11/3/2020   Narrative & Impression   Examination: Nuclear medicine DATscan for Dopamine Receptor Localization.   Examination: NM Brain Image Tomographic (SPECT) DATscan   Date: 11/3/2020 3:21 PM   Indication: Parkinsonism   Comparison: None   Additional Information: none   Interfering Medications: None    Technique:   The patient initially received 1 ml of Lugol's solution orally prior     Antiphospholipid antibody syndrome (H) 2005    Ravi's     Antiphospholipid antibody syndrome (H)     Ravi's, noted negative per testing re-done in 2008 in hematology note 01/13/2009     Articulation disorder 09/23/2020     Atrial fibrillation (H) 04/2011     Benign prostatic hyperplasia with urinary retention      Cirrhosis (H)      CKD (chronic kidney disease) stage 2, GFR 60-89 ml/min      Diabetes mellitus type II     steroid induced     DJD (degenerative joint disease) of knee     SHAWN, worse on right     DVT (deep venous thrombosis) (H)      ED (erectile dysfunction)      Gallbladder polyp 05/2010     Fqnzg-Wszkym-Ctpe Disease 2007    BMT     Hemochromatosis      Hodgkin's disease NOS     5 cycles of ABVD in 2011     HTN (hypertension)      Hyperlipidaemia LDL goal <100      Multiple thyroid nodules     benign      Myeloproliferative disorder (H)     atypical, [facility]     Parkinson disease (H) 09/24/2020     PE (pulmonary embolism) 11/2004     Polyneuropathy      Primary pulmonary hypertension (H)      Thrombocytopenia (H) 06/08/2021     Social History     Socioeconomic History     Marital status:      Spouse name: Angelica     Number of children: 2     Years of education: 21     Highest education level: Not on file   Occupational History     Occupation:      Employer: [employer]     Employer: [employer]   Tobacco Use     Smoking status: Never Smoker     Smokeless tobacco: Never Used   Substance and Sexual Activity     Alcohol use: No     Drug use: No     Sexual activity: Not Currently     Partners: Female   Other Topics Concern     Parent/sibling w/ CABG, MI or angioplasty before 65F 55M? No   Social History Narrative     about 50 yrs        Wife has some health issues - back pain - second knee replaced    She had gastric bypass and a fib and bad mitral valve        Son in [place]    [place] 10 yo son.         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[employer]        Born and raised in [place]              Social Determinants of Health     Financial Resource Strain:      Difficulty of Paying Living Expenses:    Food Insecurity:      Worried About Running Out of Food in the Last Year:      Ran Out of Food in the Last Year:    Transportation Needs:      Lack of Transportation (Medical):      Lack of Transportation (Non-Medical):    Physical Activity:      Days of Exercise per Week:      Minutes of Exercise per Session:    Stress:      Feeling of Stress :    Social Connections:      Frequency of Communication with Friends and Family:      Frequency of Social Gatherings with Friends and Family:      Attends Jainism Services:      Active Member of Clubs or Organizations:      Attends Club or Organization Meetings:      Marital Status:    Intimate Partner Violence:      Fear of Current or Ex-Partner:      Emotionally Abused:      Physically Abused:      Sexually Abused:      Family History   Problem Relation Age of Onset     Hypertension Mother      Cerebrovascular Disease Mother      Breast Cancer Mother      Arrhythmia Brother      Arrhythmia Sister         supraventricular tachycardia     Thyroid Disease Sister      Other - See Comments Son         lives in [place]. no kids and not .     Obsessive Compulsive Disorder Son      Depression Son      Eating Disorder Son         eats when depressed     Obesity Son      Thyroid Cancer Daughter         had thyroid removed     Bipolar Disorder Daughter      Other - See Comments Daughter         lives in [place] - single with one son 9  yrs     Other - See Comments Sister      Alzheimer Disease Father      Diabetes Paternal Aunt      Gastrointestinal Disease Maternal Grandmother         colitis      Parkinsonism Other         2 cousins with parkinson     C.A.D. No family hx of      Current Outpatient Medications    Medication Sig Dispense Refill     acetaminophen (TYLENOL) 500 MG tablet 500mg tab as needed       amoxicillin (AMOXIL) 500 MG capsule 4 x 500mg tabs by mouth 1 hour before dental appointments.       bisacodyl (DULCOLAX) 5 MG EC tablet Refer to instructions sent via [employer]magis. 4 tablet 0     calcium carbonate (TUMS) 500 MG chewable tablet 1 tab by mouth in morning or at night as needed       docusate sodium (COLACE) 50 MG capsule Take 1 capsule (50 mg) by mouth 2 times daily as needed for constipation 30 capsule 0     dutasteride (AVODART) 0.5 MG capsule Take 1 capsule (0.5 mg) by mouth daily 90 capsule 3     flecainide (TAMBOCOR) 50 MG tablet Take 1 tablet (50 mg) by mouth 2 times daily 180 tablet 2     fluticasone (FLONASE) 50 MCG/ACT nasal spray Spray 1 spray into both nostrils daily as needed for rhinitis 16 g 11     loratadine (CLARITIN) 10 MG tablet Take 10 mg by mouth daily as needed        metoprolol succinate ER (TOPROL XL) 25 MG 24 hr tablet Take 1 tablet (25 mg) by mouth daily In the evening 90 tablet 3     multivitamin (CENTRUM SILVER) tablet Take 1 tablet by mouth daily       Nutritional Supplements (ENSURE PO)        polyethylene glycol (MIRALAX) 17 g packet Take 1 packet by mouth daily as needed for constipation       PREVIDENT 5000 DRY MOUTH 1.1 % GEL topical gel Take by mouth daily  3     senna-docusate (SENOKOT-S/PERICOLACE) 8.6-50 MG tablet Take 2 tablets by mouth daily       simvastatin (ZOCOR) 10 MG tablet Take 1 tablet (10 mg) by mouth daily 90 tablet 3     tamsulosin (FLOMAX) 0.4 MG capsule Take 1 capsule (0.4 mg) by mouth daily 90 capsule 3     vitamin D3 (CHOLECALCIFEROL) 50 mcg (2000 units) tablet Take 1 tablet (50 mcg) by mouth daily       warfarin ANTICOAGULANT (COUMADIN) 2.5 MG tablet TAKE 1.25 MG (1/2 TABLET) MON/FRI AND 2.5 MG (1 TABLET) ALL OTHER DAYS. NEED APPT. (Patient taking differently: Taking 2.5mg daily. Fridays 3.75mg) 90 tablet 0         Medications                                                                                                                                                                                                                     Haresh is a 71 year old who is being evaluated via a billable video visit.      How would you like to obtain your AVS? Mychart  If the video visit is dropped, the invitation should be resent by: [phone]      Video Start Time:   Video-Visit Details    Type of service:  Video Visit    Video End Time:    Originating Location (pt. Location): Home    Distant Location (provider location):  [facility] NEUROLOGY [facility]     Platform used for Video Visit: VelmaWell        Again, thank you for allowing me to participate in the care of your patient.      Sincerely,    Ángel Hill MD

## 2021-08-10 NOTE — PROGRESS NOTES
Haresh is a 71 year old who is being evaluated via a billable video visit.      How would you like to obtain your AVS? Mychart  If the video visit is dropped, the invitation should be resent by: [phone]      Video Start Time:   Video-Visit Details    Type of service:  Video Visit    Video End Time:    Originating Location (pt. Location): Home    Distant Location (provider location):  [facility] NEUROLOGY [facility]     Platform used for Video Visit: [employer]

## 2021-08-10 NOTE — PROGRESS NOTES
Pt passes TOV and will follow up at next visit for his PVR. If he is ever able to not urinate he will call us if we are open or seek emergent/urgent care

## 2021-08-10 NOTE — TELEPHONE ENCOUNTER
Reason for Call:  Other call back    Detailed comments: patient is calling would like to know when and where can get catheter removed from surgery previously.  Please call to discuss. Thank you.    Phone Number Patient can be reached at: Home number on file [phone] (home)    Best Time:     Can we leave a detailed message on this number? YES    Call taken on 8/10/2021 at 8:26 AM by Coby Munguia

## 2021-08-10 NOTE — PATIENT INSTRUCTIONS
Assessment:  (G20) Parkinson disease (H)  (primary encounter diagnosis)  Dopamine scan - (datscan)  11/3/2020  A presynaptic dopaminergic deficit is demonstrated bilaterally.    Gait/Med related complications/Falls     Exercise/Therapy     Cognitive/Driving     Mood       Daughter with bipolar  Son with mood/ocd/eating    Hallucinations/delusions     Sleep  Pseudoeph-doxylamine DM APAP nyquil     Bladder  Renal function.   May have stage 2 disease.      Prostate - denies problems. He has has nocturia couple times per night.   Denies elevated psa    Dutasteride avodart 0.5mg - daily  Tamsulosin flomax 0.4mg - stopped  Had low blood pressure and fluids    July 5th ED visit and got catheter  Surgery yesterday laser and should get catheter out  Dr Jose G Hawley     GI/Constipation/GERD  Swallow study 9/22/2020  1. Multiple events of deep laryngeal penetration with thinner barium consistencies with intermittent aspiration.      Gallstones but has not gallbladder removed.  Had a gallbladder attack x 1 and is not on actigall    Bisacodyl dulcolax 5mg EC tablet  Calcium carbonate tums 500mg  Nutritional supplement - 1 bottle per day   Pantoprazole protonix 20mg - not using  Polythyelene glycol miralax - encouraged to use   Prevident 5000 dry mouth - using  Senna-docusate senexon-S senokot-S 2 tabs per day      ENDO  Cholesterol  simvastatin 10mg/day  Vitamin D3 cholecalciferol 50 mcg 2000 units  Had diabetes in the past from prednisone  And this may have resolved  May have a thyroid nodule.  TSH was normal.      Cardio/heart  Flecainide tambocor 50mg twice daily  Metoprolol succinate ER Toprol XL 25mg 24 hr tablet     Vision     Heme    Folic acid folvite 1mg    Anticoagulated  Warfarin    Hereditary hemochromatosis gene - gene that increases his risk and has not had problems with this. Had had phlebotomies x 2 in the past.      AYUSH Torres     History of pulmonary embolii - back in 2004. This was related to blood clotting  problems.   May have had a DVT. Reportedly has had antiphospholipid antibody. He is on coumadin.      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 Had a transplant from Dr. 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  No problems since then.     Graft vs host    He has high sed rate and will be seeing ID next week   crp was 12.8  Sed rate was 90  8/3/2021     Other:     Hodgkin's lymphoma in the past 2011 and had chemotherapy for this and followed with CT scan and reportedly this is gone.      Pulmonary hypertension in the past.     Fluticasone flonase 50 mcg/act nasal spray  Loratadine claritin 10mg      msk - bilateral knee replacements.     Eye - left eye has been poor since age 2 due to a lazy eye - amblyopia  Right eye post cataract surgery had an artificial lens put in and does not wear glasses till the evening      Hearing. Feels he has wax in his left ear and partial problem with the right ear. It is variable problem. Has used ear wax removal.      He reports a neuropathy      Medications                 Acetaminophen tylenol 500mg As needed            Amoxicillin amoxil 500mg For dentist           Bisacodyl dulcolax 5mg EC tablet        Calcium carbonate tums 500mg As needed           Cephalexin keflex 750 Brief course       Docusate colace 50mg As needed       Dutasteride avodart 0.5mg 1       Flecainide tambocor 50mg 1     1     Folic acid folvite 1mg stopped           Fluticasone flonase 50 mcg/act nasal spray Not using           Loratadine claritin 10mg  Not taking           Metoprolol succinate ER Toprol XL 25mg 24 hr tablet      1     MVI centrum variable           Nutritional supplement - ensure 1 bottle           Pantoprazole protonix 20mg Not taking            Polythyelene glycol  miralax Not using          Prevident 5000 dry mouth using           Pseudoeph-doxylamine DM APAP nyquil Not taking usually           Senna-docusate 8.6-50 2            Simvastatin zocor 10mg       1     Tamsulosin flomax 0.4mg stopped       Vitamin D3 cholecalciferol 50 mcg 2000 units        Warfarin anticoagulant coumadin 2.5mg 5 days - 1 tab; 1/2 tab m/f                              Plan:    Ongoing blood pressure issues - has normalized lately with a reduction in his metoprolol long acting 25mg dose and flecainide. 129/67 wrist bp reading today    If we are going to try carbidopa/levodopa Sinemet  Would need him to monitor his blood pressure as sinemet can reduce blood pressure and may need 1/2 the amount of metoprolol.     Sinemet Rx sent but would wait on starting this medication.  Would benefit from Leslie Arredondo's input at some point.    He also has ongoing sed rate issues and will be seeing infectious disease next week  Not sure if there were rectal issues causing elevation sed rate or if someone has considered polymyalgia rheumatica, etc vs chronic infection. Consider visiting with rheumatology besides the econsult.    From their consultation --- Recommendations: I recommend consulting with gastroenterology and/or colorectal surgery regarding the PET appearance. Consultation with infectious disease may also be warranted, given the pulmonary abnormalities noted on PET I recommend obtaining urinalysis. If malignancy or infection is not suggested by the PET scan findings, and if weight loss continues, consider temporal artery biopsy.      Would defer to pcp in regards to considering this depending on workup and others input.     Will be seeing Dr. Coon next week in regards to his neuropathy    Will be getting his urinary catheter removed shortly as he had a prostate procedure.    Return back in 3 months or so.

## 2021-08-10 NOTE — PROGRESS NOTES
Pt presented for TOV. With pt sitting on bed bladder was filled with 240ml of sterile water. 23cc balloon deflated and cath was removed with ease and intact. Pt left to void    Shanice JARRELL RN Specialty Triage 8/10/2021 11:23 AM

## 2021-08-10 NOTE — PROGRESS NOTES
Pt was able to void 200ml of red tinged urine into measuring container.     Shanice JARRELL RN Specialty Triage 8/10/2021 11:36 AM

## 2021-08-11 ENCOUNTER — TELEPHONE (OUTPATIENT)
Dept: MULTI SPECIALTY CLINIC | Facility: CLINIC | Age: 71
End: 2021-08-11

## 2021-08-11 NOTE — TELEPHONE ENCOUNTER
[facility] Call Center    Phone Message    May a detailed message be left on voicemail: no     Reason for Call: Appointment Intake    Referring Provider Name: Ángel Hill MD  Diagnosis and/or Symptoms: Elevated SED rate; inflammation.    Patient is requesting [facility], per protocol clinical review is required for elevated SED rate.     Action Taken: Message routed to:  [facility] ([facility]): Rheum    Travel Screening: Not Applicable    Patient stated he would also be open to scheduling at [facility].

## 2021-08-13 ENCOUNTER — TRANSFERRED RECORDS (OUTPATIENT)
Dept: HEALTH INFORMATION MANAGEMENT | Facility: CLINIC | Age: 71
End: 2021-08-13

## 2021-08-13 ENCOUNTER — ANTICOAGULATION THERAPY VISIT (OUTPATIENT)
Dept: ANTICOAGULATION | Facility: CLINIC | Age: 71
End: 2021-08-13

## 2021-08-13 DIAGNOSIS — I48.91 ATRIAL FIBRILLATION (H): ICD-10-CM

## 2021-08-13 DIAGNOSIS — Z79.01 LONG TERM CURRENT USE OF ANTICOAGULANT THERAPY: Primary | ICD-10-CM

## 2021-08-13 DIAGNOSIS — I48.0 PAROXYSMAL ATRIAL FIBRILLATION (H): ICD-10-CM

## 2021-08-13 LAB — INR (EXTERNAL): 1.1 (ref 0.9–1.1)

## 2021-08-13 NOTE — CONFIDENTIAL NOTE
ANTICOAGULATION MANAGEMENT     Haresh Rock 71 year old male is on warfarin with subtherapeutic INR result. (Goal INR 2.0-3.0)    Recent labs: (last 7 days)     08/13/21  1408   INR 1.1       ASSESSMENT     Source(s): Patient/Caregiver Call       Warfarin doses taken: Warfarin taken as instructed    Diet: No new diet changes identified    New illness, injury, or hospitalization: Yes: Pt had a TURP on 08/09.    Medication/supplement changes: Keflex 08/09-08/15. Keflex may increase INR/risk of bleeding.    Signs or symptoms of bleeding or clotting: No. No bleeding since Tuesday    Previous INR: Therapeutic last 2(+) visits    Additional findings: None     PLAN     Recommended plan for temporary change(s) affecting INR     Dosing Instructions: Booster dose then continue your current warfarin dose with next INR in 2 weeks       Summary  As of 8/13/2021    Full warfarin instructions:  8/13: 6.25 mg; Otherwise 3.75 mg every Fri; 2.5 mg all other days   Next INR check:  8/20/2021             Telephone call with Haresh who verbalizes understanding and agrees to plan    Patient to recheck with home meter    Education provided: Target INR goal and significance of current INR result    Plan made per [facility] anticoagulation protocol    Hari Saavedra RN  Anticoagulation Clinic  8/13/2021    _______________________________________________________________________     Anticoagulation Episode Summary     Current INR goal:  2.0-3.0   TTR:  59.2 % (1 y)   Target end date:  Indefinite   Send INR reminders to:  CHELSEA CHILEL    Indications    Long-term (current) use of anticoagulants [Z79.01] [Z79.01]  Atrial fibrillation (H) [I48.91]  Antiphospholipid antibody syndrome (H) (Resolved) [D68.61]  Personal history of DVT (deep vein thrombosis) (Resolved) [Z86.718]  Atrial fibrillation  unspecified type (H) (Resolved) [I48.91]  Paroxysmal atrial fibrillation (H) [I48.0]           Comments:  JESSICA okay to manage by exception          Anticoagulation Care Providers     Provider Role Specialty Phone number    Anya Nowak MD Referring Family Medicine [phone]

## 2021-08-17 ENCOUNTER — VIRTUAL VISIT (OUTPATIENT)
Dept: INFECTIOUS DISEASES | Facility: CLINIC | Age: 71
End: 2021-08-17
Attending: INTERNAL MEDICINE
Payer: COMMERCIAL

## 2021-08-17 ENCOUNTER — OFFICE VISIT (OUTPATIENT)
Dept: UROLOGY | Facility: CLINIC | Age: 71
End: 2021-08-17
Payer: COMMERCIAL

## 2021-08-17 ENCOUNTER — PRE VISIT (OUTPATIENT)
Dept: INFECTIOUS DISEASES | Facility: CLINIC | Age: 71
End: 2021-08-17

## 2021-08-17 VITALS
SYSTOLIC BLOOD PRESSURE: 119 MMHG | WEIGHT: 154 LBS | DIASTOLIC BLOOD PRESSURE: 77 MMHG | HEART RATE: 107 BPM | BODY MASS INDEX: 20.89 KG/M2

## 2021-08-17 DIAGNOSIS — T17.908A ASPIRATION INTO RESPIRATORY TRACT, INITIAL ENCOUNTER: ICD-10-CM

## 2021-08-17 DIAGNOSIS — R06.02 SHORTNESS OF BREATH: ICD-10-CM

## 2021-08-17 DIAGNOSIS — R33.8 BENIGN PROSTATIC HYPERPLASIA WITH URINARY RETENTION: Primary | ICD-10-CM

## 2021-08-17 DIAGNOSIS — R70.0 ELEVATED ERYTHROCYTE SEDIMENTATION RATE: Primary | ICD-10-CM

## 2021-08-17 DIAGNOSIS — N40.1 BENIGN PROSTATIC HYPERPLASIA WITH URINARY RETENTION: Primary | ICD-10-CM

## 2021-08-17 PROCEDURE — 99204 OFFICE O/P NEW MOD 45 MIN: CPT | Mod: 95 | Performed by: INTERNAL MEDICINE

## 2021-08-17 PROCEDURE — 99024 POSTOP FOLLOW-UP VISIT: CPT | Performed by: UROLOGY

## 2021-08-17 PROCEDURE — 51798 US URINE CAPACITY MEASURE: CPT | Performed by: UROLOGY

## 2021-08-17 ASSESSMENT — PAIN SCALES - GENERAL: PAINLEVEL: NO PAIN (0)

## 2021-08-17 NOTE — PROGRESS NOTES
Bladder scan performed. Maximum post void residual after 3 scans was 68 ml. MD was informed.    Yanely Tello CMA,

## 2021-08-17 NOTE — PROGRESS NOTES
Haresh Rock is a 71 year old man who is being evaluated via a billable video visit.      How would you like to obtain your AVS? MyChart  If the video visit is dropped, the invitation should be resent by: Send to e-mail at: [email or web address]  Will anyone else be joining your video visit? 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 He also has hereditary hemachromatosis with cirrhosis, and a recent diagnosis of Parkinson's disease. He also has progressive weight loss over several months, but also has dysphagia related to the Parkinson's, and his diet is fairly limited.     Upon extensive lab evaluation he was found to have an ESR of 92 on 2/15/21 and this has remained persistently elevated on several checks since.   There has not been clear evidence of infection, rash, joint pain, joint swelling, or headache.  Most recent labs 8/3/2021 with ESR of 90 and CRP of 12 mg/dL. CBC important for platelets of 147, which is a stable thrombocytopenia for this patient.     2/26/2021 CTs and PET without evidence for recurrence of malignancy.  There were findings of nodular opacities in the left lower lobe with associated endobronchial debris, also with basilar groundglass opacities.  There was also hypermetabolic activity at the anorectal junction with diffuse wall thickening on CT.    3/17/2021 Flex sigmoidoscopy discovered a 4 mm polyp in the distal sigmoid colon that was removed and erythematous mucosa in the entire examined colon that was biopsied. Histopathology noted colonic mucosa with superficial vascular congestion and melanosis coli without evidence for GVHD.  The polyp was a tubular adenoma, negative for high-grade dysplasia.   "Full colonoscopy was done 4/14/2021 without new findings.    About once per month, Haresh reports that he will get a low grade fever.  This happened last on 8/15.  He woke up in the night with a temp of 100.9 F and chills.   He thinks this happens when he gets constipated.   He does not get diarrhea, unless he uses a stool regimen to help with the constipation.    He can get out of breath with mild exertion, like 1-2 flights of stairs.  He lives in a split level home, and he does get around the half flights fairly well.  He feels drainage down the back of his throat and will cough to clear his throat.  He does not think he has a cough that comes deep from the chest.   His voice volume is lower and he feels hoarse.    He is at risk for aspiration with his dysphagia.    He denies any headaches or vision changes, but does get a \"stuffy head\" which is thinks is due to environmental allergies.  He will get plugged ears as well, without any ear drainage. I asked about temporal pain specifically and he denied.  I also tried to assess for polymyalgia rheumatica, but I think given his Parkinsons and co morbid conditions, he really could not say if he has fatigue or focal stiffness in the neck, shoulders, or upper arms.  He does not wake up with muscular pain.  He has history of bilateral knee replacements due to his osteoarthritis, but he feels that the knees are doing well without redness, swelling, or heat.   No other joint swelling.     He was having urinary difficulties, but has done well since his TURP and has no urinary complaints today.      I have reviewed and updated the patient's Past Medical History, Social History, Family History and Medication List.    OBJECTIVE:    Current Outpatient Medications   Medication     acetaminophen (TYLENOL) 500 MG tablet     amoxicillin prescribed to take 2000mg before dental procedures     bisacodyl (DULCOLAX) 5 MG EC tablet     calcium carbonate (TUMS) 500 MG chewable tablet     " carbidopa-levodopa (SINEMET)  MG tablet     docusate sodium (COLACE) 50 MG capsule     dutasteride (AVODART) 0.5 MG capsule     flecainide (TAMBOCOR) 50 MG tablet     fluticasone (FLONASE) 50 MCG/ACT nasal spray     loratadine (CLARITIN) 10 MG tablet     metoprolol succinate ER (TOPROL XL) 25 MG 24 hr tablet     multivitamin (CENTRUM SILVER) tablet     polyethylene glycol (MIRALAX) 17 g packet     PREVIDENT 5000 DRY MOUTH 1.1 % GEL topical gel     senna-docusate (SENOKOT-S/PERICOLACE) 8.6-50 MG tablet     vitamin D3 (CHOLECALCIFEROL) 50 mcg (2000 units) tablet     dapagliflozin (FARXIGA) 5 MG TABS tablet     Nutritional Supplements (ENSURE PO)     rosuvastatin (CRESTOR) 40 MG tablet     sacubitril-valsartan (ENTRESTO) 24-26 MG per tablet     warfarin ANTICOAGULANT (COUMADIN) 2.5 MG tablet     No current facility-administered medications for this visit.       Allergies   Allergen Reactions     Seasonal Allergies        Exam via video visit:     GENERAL: Patient appears chronically ill but is not in any acute distress    HEENT: The eyes do not appear to have any icterus or injection.  EOM appear intact.  RESPIRATORY:  No cough or labored breathing is noted.  SKIN:  No rashes are visible  NEURO:  Awake, alert, oriented  PSYCH: Affect is bright. Speech fluent and appropriate.      The rest of a comprehensive physical examination is deferred due to the public health emergency video visit restrictions.    Labs  Reviewed extensively in Epic    Newest:  8/3/2021 with ESR of 90 and CRP of 12 mg/dL.   CBC with normal WBC and platelets of 147    Imaging  2/26/2021  PET with CT:  In this patient with history of Hodgkin lymphoma:  1. Nodular opacities in the left lower lobe with associated  endobronchial debris, also by basilar groundglass opacities which may  represent infection/inflammation versus aspiration, including Covid.  2. Mildly hypermetabolic left hilar and subcarinal lymph node, favored  to be reactive.  3. No  hypermetabolic lymphadenopathy in the abdomen or pelvis.  4. Hypermetabolic activity at the anorectal junction with diffuse wall  thickening on CT. Recommend direct visualization.  5. Punctate calcific density at the right UVJ without associated  hydroureter. This may represent recently passed renal stone versus  bladder calculus.  6. Additional incidental findings are stable, including cholelithiasis  without acute cholecystitis, diverticulosis without acute  diverticulitis, and prostatomegaly.    6/8/2021  Chest XR  IMPRESSION: No pleural fluid or pneumothorax. No airspace disease or  edema. Normal size of the heart. There are degenerative changes in the  spine.    ASSESSMENT:    Chronically elevated ESR with mild CRP elevation    Patient has mounting comorbidities.   Among them the history of GVHD stood out as a potential contributor to elevation of inflammatory markers.  However, the scans in 2/2021 did not suggest ongoing GVHD and the biopsy from the sigmoid was not consistent.  Since there was inflammation throughout the colon, I do wonder if the biopsy missed it. Patient is reporting once monthly low grade fevers and chills, that he associates with constipation.  If he is correct, one could suppose that he is having bacterial translocation when constipated across an inflamed colon.    Otherwise, he had the pulmonary findings on CT about 6 months ago.  They were nonspecific.  He does not have much the way of respiratory symptoms.   He is not sure if he can cough up any sputum from his chest, but he says he will try to collect a specimen in the morning.    PLAN:    - we will try for sputum collection for aerobic, anaerobic, fungal, and AFB    - might be worth repeating the CT Chest at this point, as it has been now 6 months, and respiratory symptoms are not very predominant.   If there are persistent findings we could evaluate this further with some more blood tests, and consider bronchoscopy with BAL if  patient is not able to expectorate sputum.  Patient is also at risk for chronic aspiration.    - elevated ESR with only a mild CRP also suggests to me polymyalgia rheumatica, which was hard for me to tease out today on video visit on ROS given his Parkinsons symptoms.   Associated temporal arteritis is possible even without headache or vision change.  I see that Rheumatology is awaiting Oncology and Infectious Disease evaluations, and has not had a formal consultation as of yet  (I just see an e-consult).    - follow-up 9/21/2021        Marcelo Jacques MD, MS  Infectious Disease    Time:  A total of 45 minutes was spent in care of the patient today. 30 minutes were spent on the video, with an additional 15 minutes spent on chart review, orders, and care coordination.        Video-Visit Details    Type of service:  Video Visit    Video Start Time: 3:00 PM   Video End Time:  3:30 PM    Originating Location (pt. Location): Home    Distant Location (provider location):  [facility] [place]     Platform used for Video Visit: [employer]

## 2021-08-17 NOTE — TELEPHONE ENCOUNTER
Referring provider: Ángel Hill MD, Neurology  Reason for referral: Parkinson disease, Elevated sed rate  Lab results:    Component      Latest Ref Rng & Units 6/8/2021 7/27/2021 8/3/2021   Sed Rate      0 - 20 mm/hr 106 (H) 107 (H) 90 (H)   Rheumatoid Factor      <12 IU/mL <7     Cyclic Citrullinated Peptide Antibody, IgG      <7 U/mL 1     CRP Inflammation      0.0 - 8.0 mg/L 12.3 (H)  12.8 (H)       Routing to on call provider.    Briigtte Tom CMA   8/17/2021 9:42 AM

## 2021-08-17 NOTE — LETTER
8/17/2021       RE: Haresh Rock  [address] [place] [address]     Dear Colleague,    Thank you for referring your patient, Haresh Rock, to the [facility] INFECTIOUS DISEASE CLINIC [place] at [facility]. Please see a copy of my visit note below.      Haresh Rock is a 71 year old man who is being evaluated via a billable video visit.      How would you like to obtain your AVS? MyChart  If the video visit is dropped, the invitation should be resent by: Send to e-mail at: [email or web address]  Will anyone else be joining your video visit? No      Reason for visit:   Infectious Disease NEW Visit, referred for evaluation of elevated ESR    HISTORY OF THE PRESENT ILLNESS:    Haresh has history of Hodgkin's lymphoma in remission and also an atypical myelodysplastic syndrome for which he had a bone marrow transplant with subsequent development of chronic zxnfh-ubfngk-[id number] disease. He also has hereditary hemachromatosis with cirrhosis, and a recent diagnosis of Parkinson's disease. He also has progressive weight loss over several months, but also has dysphagia related to the Parkinson's, and his diet is fairly limited.     Upon extensive lab evaluation he was found to have an ESR of 92 on 2/15/21 and this has remained persistently elevated on several checks since.   There has not been clear evidence of infection, rash, joint pain, joint swelling, or headache.  Most recent labs 8/3/2021 with ESR of 90 and CRP of 12 mg/dL. CBC important for platelets of 147, which is a stable thrombocytopenia for this patient.     2/26/2021 CTs and PET without evidence for recurrence of malignancy.  There were findings of nodular opacities in the left lower lobe with associated endobronchial debris, also with basilar groundglass opacities.  There was also hypermetabolic activity at the anorectal junction with diffuse wall thickening on CT.    3/17/2021 Flex sigmoidoscopy  "discovered a 4 mm polyp in the distal sigmoid colon that was removed and erythematous mucosa in the entire examined colon that was biopsied. Histopathology noted colonic mucosa with superficial vascular congestion and melanosis coli without evidence for GVHD.  The polyp was a tubular adenoma, negative for high-grade dysplasia.  Full colonoscopy was done 4/14/2021 without new findings.    About once per month, Haresh reports that he will get a low grade fever.  This happened last on 8/15.  He woke up in the night with a temp of 100.9 F and chills.   He thinks this happens when he gets constipated.   He does not get diarrhea, unless he uses a stool regimen to help with the constipation.    He can get out of breath with mild exertion, like 1-2 flights of stairs.  He lives in a split level home, and he does get around the half flights fairly well.  He feels drainage down the back of his throat and will cough to clear his throat.  He does not think he has a cough that comes deep from the chest.   His voice volume is lower and he feels hoarse.    He is at risk for aspiration with his dysphagia.    He denies any headaches or vision changes, but does get a \"stuffy head\" which is thinks is due to environmental allergies.  He will get plugged ears as well, without any ear drainage. I asked about temporal pain specifically and he denied.  I also tried to assess for polymyalgia rheumatica, but I think given his Parkinsons and co morbid conditions, he really could not say if he has fatigue or focal stiffness in the neck, shoulders, or upper arms.  He does not wake up with muscular pain.  He has history of bilateral knee replacements due to his osteoarthritis, but he feels that the knees are doing well without redness, swelling, or heat.   No other joint swelling.     He was having urinary difficulties, but has done well since his TURP and has no urinary complaints today.      I have reviewed and updated the patient's Past Medical " History, Social History, Family History and Medication List.    OBJECTIVE:    Current Outpatient Medications   Medication     acetaminophen (TYLENOL) 500 MG tablet     amoxicillin prescribed to take 2000mg before dental procedures     bisacodyl (DULCOLAX) 5 MG EC tablet     calcium carbonate (TUMS) 500 MG chewable tablet     carbidopa-levodopa (SINEMET)  MG tablet     docusate sodium (COLACE) 50 MG capsule     dutasteride (AVODART) 0.5 MG capsule     flecainide (TAMBOCOR) 50 MG tablet     fluticasone (FLONASE) 50 MCG/ACT nasal spray     loratadine (CLARITIN) 10 MG tablet     metoprolol succinate ER (TOPROL XL) 25 MG 24 hr tablet     multivitamin (CENTRUM SILVER) tablet     polyethylene glycol (MIRALAX) 17 g packet     PREVIDENT 5000 DRY MOUTH 1.1 % GEL topical gel     senna-docusate (SENOKOT-S/PERICOLACE) 8.6-50 MG tablet     vitamin D3 (CHOLECALCIFEROL) 50 mcg (2000 units) tablet     dapagliflozin (FARXIGA) 5 MG TABS tablet     Nutritional Supplements (ENSURE PO)     rosuvastatin (CRESTOR) 40 MG tablet     sacubitril-valsartan (ENTRESTO) 24-26 MG per tablet     warfarin ANTICOAGULANT (COUMADIN) 2.5 MG tablet     No current facility-administered medications for this visit.       Allergies   Allergen Reactions     Seasonal Allergies        Exam via video visit:     GENERAL: Patient appears chronically ill but is not in any acute distress    HEENT: The eyes do not appear to have any icterus or injection.  EOM appear intact.  RESPIRATORY:  No cough or labored breathing is noted.  SKIN:  No rashes are visible  NEURO:  Awake, alert, oriented  PSYCH: Affect is bright. Speech fluent and appropriate.      The rest of a comprehensive physical examination is deferred due to the public health emergency video visit restrictions.    Labs  Reviewed extensively in Epic    Newest:  8/3/2021 with ESR of 90 and CRP of 12 mg/dL.   CBC with normal WBC and platelets of 147    Imaging  2/26/2021  PET with CT:  In this patient with  history of Hodgkin lymphoma:  1. Nodular opacities in the left lower lobe with associated  endobronchial debris, also by basilar groundglass opacities which may  represent infection/inflammation versus aspiration, including Covid.  2. Mildly hypermetabolic left hilar and subcarinal lymph node, favored  to be reactive.  3. No hypermetabolic lymphadenopathy in the abdomen or pelvis.  4. Hypermetabolic activity at the anorectal junction with diffuse wall  thickening on CT. Recommend direct visualization.  5. Punctate calcific density at the right UVJ without associated  hydroureter. This may represent recently passed renal stone versus  bladder calculus.  6. Additional incidental findings are stable, including cholelithiasis  without acute cholecystitis, diverticulosis without acute  diverticulitis, and prostatomegaly.    6/8/2021  Chest XR  IMPRESSION: No pleural fluid or pneumothorax. No airspace disease or  edema. Normal size of the heart. There are degenerative changes in the  spine.    ASSESSMENT:    Chronically elevated ESR with mild CRP elevation    Patient has mounting comorbidities.   Among them the history of GVHD stood out as a potential contributor to elevation of inflammatory markers.  However, the scans in 2/2021 did not suggest ongoing GVHD and the biopsy from the sigmoid was not consistent.  Since there was inflammation throughout the colon, I do wonder if the biopsy missed it. Patient is reporting once monthly low grade fevers and chills, that he associates with constipation.  If he is correct, one could suppose that he is having bacterial translocation when constipated across an inflamed colon.    Otherwise, he had the pulmonary findings on CT about 6 months ago.  They were nonspecific.  He does not have much the way of respiratory symptoms.   He is not sure if he can cough up any sputum from his chest, but he says he will try to collect a specimen in the morning.    PLAN:    - we will try for sputum  collection for aerobic, anaerobic, fungal, and AFB    - might be worth repeating the CT Chest at this point, as it has been now 6 months, and respiratory symptoms are not very predominant.   If there are persistent findings we could evaluate this further with some more blood tests, and consider bronchoscopy with BAL if patient is not able to expectorate sputum.  Patient is also at risk for chronic aspiration.    - elevated ESR with only a mild CRP also suggests to me polymyalgia rheumatica, which was hard for me to tease out today on video visit on ROS given his Parkinsons symptoms.   Associated temporal arteritis is possible even without headache or vision change.  I see that Rheumatology is awaiting Oncology and Infectious Disease evaluations, and has not had a formal consultation as of yet  (I just see an e-consult).    - follow-up 9/21/2021        Marcelo Jacques MD, MS  Infectious Disease    Time:  A total of 45 minutes was spent in care of the patient today. 30 minutes were spent on the video, with an additional 15 minutes spent on chart review, orders, and care coordination.        Video-Visit Details    Type of service:  Video Visit    Video Start Time: 3:00 PM   Video End Time:  3:30 PM    Originating Location (pt. Location): Home    Distant Location (provider location):  [facility] INFECTIOUS DISEASE CLINIC [place]     Platform used for Video Visit: MedaNext

## 2021-08-17 NOTE — TELEPHONE ENCOUNTER
I reviewed neurology note from August 10, when persistent elevated sedimentation rate was noted.  Results of my rheumatology E consultation were also noted.  In the absence of escalating headaches or new onset proximal muscle pain since the E consult was completed, I recommend evaluating possibility of occult malignancy or infection.  I see that patient is appropriately scheduled for ID consultation, and I defer to primary care for coordinating appropriate oncology services based on the patient's history of distant lymphoma, and positive recent PET scan.  If ID and malignancy evaluations are negative, or if there are new symptoms that would point towards polymyalgia rheumatica or giant cell arteritis, rheumatology consult attending should again review dyspnea on exertion request.  Until then, defer scheduling consultation with rheumatology.

## 2021-08-17 NOTE — PROGRESS NOTES
Chief Complaint   Patient presents with     Surgical Followup       Haresh Rock is a 71 year old male who presents today for follow up of   Chief Complaint   Patient presents with     Surgical Followup    f/u post TURP for retention of urine.  He voids well since surgery with mild incontinence.    Current Outpatient Medications   Medication Sig Dispense Refill     acetaminophen (TYLENOL) 500 MG tablet 500mg tab as needed       amoxicillin (AMOXIL) 500 MG capsule 4 x 500mg tabs by mouth 1 hour before dental appointments.       bisacodyl (DULCOLAX) 5 MG EC tablet Refer to instructions sent via [employer]. 4 tablet 0     calcium carbonate (TUMS) 500 MG chewable tablet 1 tab by mouth in morning or at night as needed       carbidopa-levodopa (SINEMET)  MG tablet Start with 1/2 tab daily x 3days then 1/2 tab twice daily for 3-7 days then 1 tab twice daily for one week then 1 tablet 3 times per day. The dose can be increased if there is no significant benefit. IT may take 1-3 weeks before benefit is seen  To lessen the effects that protein has on the effectiveness of sinemet (i.e.. Levodopa), it is best to take this medication on an empty stomach one hour before or two hours after meals.     If you are experiencing nausea with the medication, then you should take the medication with a CRACKER, GINGER TABLETS OR WITH FOOD. CALL IF STILL HAVING NAUSEA AS WE CAN TRY EXTRA CARBIDOPA, TRIMETHOBENZAMIDE ETC.     Besides nausea there are other side effects including the rare occurrence of a rash to the yellow dye in the sinemet tablets if you are taking the 25/100 formulation.     Iron may interfere with the effectiveness of this medication so do not take iron supplements at the same time you are taking sinemet. 90 tablet 11     cephALEXin (KEFLEX) 750 MG capsule Take 1 capsule by mouth daily       docusate sodium (COLACE) 50 MG capsule Take 1 capsule (50 mg) by mouth 2 times daily as needed for constipation 30 capsule 0      dutasteride (AVODART) 0.5 MG capsule Take 1 capsule (0.5 mg) by mouth daily 90 capsule 3     flecainide (TAMBOCOR) 50 MG tablet Take 1 tablet (50 mg) by mouth 2 times daily 180 tablet 2     fluticasone (FLONASE) 50 MCG/ACT nasal spray Spray 1 spray into both nostrils daily as needed for rhinitis 16 g 11     HYDROcodone-acetaminophen (NORCO) 5-325 MG tablet Take 1 tablet by mouth daily       loratadine (CLARITIN) 10 MG tablet Take 10 mg by mouth daily as needed        metoprolol succinate ER (TOPROL XL) 25 MG 24 hr tablet Take 1 tablet (25 mg) by mouth daily In the evening 90 tablet 3     multivitamin (CENTRUM SILVER) tablet Take 1 tablet by mouth daily       Nutritional Supplements (ENSURE PO)        polyethylene glycol (MIRALAX) 17 g packet Take 1 packet by mouth daily as needed for constipation       PREVIDENT 5000 DRY MOUTH 1.1 % GEL topical gel Take by mouth daily  3     senna-docusate (SENOKOT-S/PERICOLACE) 8.6-50 MG tablet Take 2 tablets by mouth daily       simvastatin (ZOCOR) 10 MG tablet Take 1 tablet (10 mg) by mouth daily 90 tablet 3     vitamin D3 (CHOLECALCIFEROL) 50 mcg (2000 units) tablet Take 1 tablet (50 mcg) by mouth daily       warfarin ANTICOAGULANT (COUMADIN) 2.5 MG tablet TAKE 1.25 MG (1/2 TABLET) MON/FRI AND 2.5 MG (1 TABLET) ALL OTHER DAYS. NEED APPT. (Patient taking differently: Taking 2.5mg daily. Fridays 3.75mg) 90 tablet 0     Allergies   Allergen Reactions     Seasonal Allergies       Past Medical History:   Diagnosis Date     Abnormal dopamine scan (DaTSCAN) 2020 11/04/2020    [phone] 11/3/2020   Narrative & Impression   Examination: Nuclear medicine DATscan for Dopamine Receptor Localization.   Examination: NM Brain Image Tomographic (SPECT) DATscan   Date: 11/3/2020 3:21 PM   Indication: Parkinsonism   Comparison: None   Additional Information: none   Interfering Medications: None   Technique:   The patient initially received 1 ml of Lugol's solution orally prior      Antiphospholipid antibody syndrome (H) 2005    Ravi's     Antiphospholipid antibody syndrome (H)     Ravi's, noted negative per testing re-done in 2008 in hematology note 01/13/2009     Articulation disorder 09/23/2020     Atrial fibrillation (H) 04/2011     Benign prostatic hyperplasia with urinary retention      Cirrhosis (H)      CKD (chronic kidney disease) stage 2, GFR 60-89 ml/min      Diabetes mellitus type II     steroid induced     DJD (degenerative joint disease) of knee     SHAWN, worse on right     DVT (deep venous thrombosis) (H)      ED (erectile dysfunction)      Gallbladder polyp 05/2010     Kwuie-Eandms-Mgsk Disease 2007    BMT     Hemochromatosis      Hodgkin's disease NOS     5 cycles of ABVD in 2011     HTN (hypertension)      Hyperlipidaemia LDL goal <100      Multiple thyroid nodules     benign      Myeloproliferative disorder (H)     atypical, [facility]     Parkinson disease (H) 09/24/2020     PE (pulmonary embolism) 11/2004     Polyneuropathy      Primary pulmonary hypertension (H)      Thrombocytopenia (H) 06/08/2021     Past Surgical History:   Procedure Laterality Date     ANESTHESIA CARDIOVERSION  4/21/2011    Procedure:ANESTHESIA CARDIOVERSION; Surgeon:GENERIC ANESTHESIA PROVIDER; Location:[facility] OR     ARTHROSCOPY KNEE RT/LT      LT     CATARACT IOL, RT/LT  2017     COLONOSCOPY  10/16/2012    Procedure: COLONOSCOPY;  Colonoscopy, screening;  Surgeon: Reg Feliciano MD;  Location: [facility] OR     COLONOSCOPY N/A 4/14/2021    Procedure: COLONOSCOPY;  Surgeon: Reg Holm MD;  Location: [facility] GI     ESOPHAGOSCOPY, GASTROSCOPY, DUODENOSCOPY (EGD), COMBINED N/A 10/7/2020    Procedure: ESOPHAGOGASTRODUODENOSCOPY, WITH BIOPSY;  Surgeon: Raul Sawyer DO;  Location: [facility] OR     H ABLATION FOCAL AFIB  3/5/2013    PVI     H ABLATION FOCAL AFIB  01/01/2018     HC BONE MARROW/STEM TRANSPLANT ALLOGENIC  5/8/2007     INSERT PORT VASCULAR ACCESS       JOINT REPLACEMTN, KNEE RT/LT  3/28/12    SHAWN      VASECTOMY  1990     Family History   Problem Relation Age of Onset     Hypertension Mother      Cerebrovascular Disease Mother      Breast Cancer Mother      Arrhythmia Brother      Arrhythmia Sister         supraventricular tachycardia     Thyroid Disease Sister      Other - See Comments Son         lives in [place]. no kids and not .     Obsessive Compulsive Disorder Son      Depression Son      Eating Disorder Son         eats when depressed     Obesity Son      Thyroid Cancer Daughter         had thyroid removed     Bipolar Disorder Daughter      Other - See Comments Daughter         lives in [place] - single with one son 9  yrs     Other - See Comments Sister      Alzheimer Disease Father      Diabetes Paternal Aunt      Gastrointestinal Disease Maternal Grandmother         colitis      Parkinsonism Other         2 cousins with parkinson     C.A.D. No family hx of      Social History     Socioeconomic History     Marital status:      Spouse name: Angelica     Number of children: 2     Years of education: 21     Highest education level: None   Occupational History     Occupation:      Employer: [employer]     Employer: [employer]   Tobacco Use     Smoking status: Never Smoker     Smokeless tobacco: Never Used   Substance and Sexual Activity     Alcohol use: No     Drug use: No     Sexual activity: Not Currently     Partners: Female   Other Topics Concern     Parent/sibling w/ CABG, MI or angioplasty before 65F 55M? No   Social History Narrative     about 50 yrs        Wife has some health issues - back pain - second knee replaced    She had gastric bypass and a fib and bad mitral valve        Son in [place]    Daughter in [facility] with 10 yo son.         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[employer]        Born and raised in [place]              Social Determinants of Health     Financial Resource Strain:      Difficulty of Paying Living Expenses:     Food Insecurity:      Worried About Running Out of Food in the Last Year:      Ran Out of Food in the Last Year:    Transportation Needs:      Lack of Transportation (Medical):      Lack of Transportation (Non-Medical):    Physical Activity:      Days of Exercise per Week:      Minutes of Exercise per Session:    Stress:      Feeling of Stress :    Social Connections:      Frequency of Communication with Friends and Family:      Frequency of Social Gatherings with Friends and Family:      Attends Religion Services:      Active Member of Clubs or Organizations:      Attends Club or Organization Meetings:      Marital Status:    Intimate Partner Violence:      Fear of Current or Ex-Partner:      Emotionally Abused:      Physically Abused:      Sexually Abused:        REVIEW OF SYSTEMS  =================  C: NEGATIVE for fever, chills, change in weight  I: NEGATIVE for worrisome rashes, moles or lesions  E/M: NEGATIVE for ear, mouth and throat problems  R: NEGATIVE for significant cough or SHORTNESS OF BREATH  CV:  NEGATIVE for chest pain, palpitations or peripheral edema  GI: NEGATIVE for nausea, abdominal pain, heartburn, or change in bowel habits  NEURO: NEGATIVE numbness/weakness  : see HPI  PSYCH: NEGATIVE depression/anxiety  LYmph: no new enlarged lymph nodes  Ortho: no new trauma/movements    Physical Exam:  /77   Pulse 107   Wt 69.9 kg (154 lb)   BMI 20.89 kg/m     Patient is pleasant, in no acute distress, good general condition.  Lung: no evidence of respiratory distress    Abdomen: Soft, nondistended, non tender. No masses. No rebound or guarding.   Exam: bladder scan < 100 ml  Skin: Warm and dry.  No redness.  Psych: normal mood and affect  Neuro: alert and oriented  Musculaskeletal: moving all extremities    Assessment/Plan:   (N40.1,  R33.8) Benign prostatic hyperplasia with urinary retention  (primary encounter diagnosis)  Comment:    Plan: doing well post op          Recheck in 3  months

## 2021-08-18 ENCOUNTER — OFFICE VISIT (OUTPATIENT)
Dept: NEUROLOGY | Facility: CLINIC | Age: 71
End: 2021-08-18
Attending: PSYCHIATRY & NEUROLOGY
Payer: COMMERCIAL

## 2021-08-18 VITALS
OXYGEN SATURATION: 96 % | DIASTOLIC BLOOD PRESSURE: 81 MMHG | SYSTOLIC BLOOD PRESSURE: 128 MMHG | HEART RATE: 78 BPM | RESPIRATION RATE: 16 BRPM

## 2021-08-18 DIAGNOSIS — G60.3 IDIOPATHIC PROGRESSIVE POLYNEUROPATHY: ICD-10-CM

## 2021-08-18 DIAGNOSIS — G57.32 PERONEAL NEUROPATHY, LEFT: Primary | ICD-10-CM

## 2021-08-18 PROCEDURE — 99215 OFFICE O/P EST HI 40 MIN: CPT | Performed by: PSYCHIATRY & NEUROLOGY

## 2021-08-18 ASSESSMENT — ENCOUNTER SYMPTOMS
NUMBNESS: 1
NIGHT SWEATS: 1
MEMORY LOSS: 0
WEIGHT GAIN: 0
TINGLING: 1
DECREASED APPETITE: 1
WEAKNESS: 1
TREMORS: 1
DIZZINESS: 1
HEADACHES: 0
FEVER: 1
SEIZURES: 0
DISTURBANCES IN COORDINATION: 0
PARALYSIS: 1
LOSS OF CONSCIOUSNESS: 0
WEIGHT LOSS: 0
SPEECH CHANGE: 0
ALTERED TEMPERATURE REGULATION: 1

## 2021-08-18 ASSESSMENT — PAIN SCALES - GENERAL: PAINLEVEL: NO PAIN (0)

## 2021-08-18 NOTE — LETTER
8/18/2021       RE: Haresh Rock  [address] [place] [address]     Dear Colleague,    Thank you for referring your patient, Haresh Rock, to the [facility] NEUROLOGY CLINIC [place] at [facility]. Please see a copy of my visit note below.    [facility] Neurology Consultation    Haresh Rock MRN# [id number]   Age: 71 year old YOB: 1950     Requesting physician: Anya Houser     Reason for Consultation: foot drop (left)      History of Presenting Symptoms:   Haresh Rock is a 71 year old male who presents today for evaluation of Foot drop (left). 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  The patient is followed with our movement disorder specialist, Dr. Hill, for his PD and on 6/10/2021 it was noted he had an ongoing left foot drop that was subacute in nature.    Upon chart review, the patient did have an EMG 6/22/2009 for numbness in his toes.  Results indicated a non-diagnostic study.    Today, the patient reports having sensory issues of the legs since 2011 when he was taking prednisone following his bone marrow transplant.  He notes that he developed numbness below his ankles and in the toes, but no pain around that time.  There is a tingling feeling that accompanies his numbness, which worsens when he rests his feet and improves with walking.  He never had any radiating pain down his legs into his feet or local trauma to his legs.  Around 05/2021, he noted that his left foot hit his step when bringing in groceries.  Since that time, he has noted worsened abilities to dorsiflex his foot on the left.  This has led to him hearing his  floot slap on the ground when walking in the mall, and a few trips (no falls).  The patient crosses his legs often when sitting in the lazy-boy at home.  He has noted having a near 50lbs weight loss over the last year.     Medications:     Current Outpatient Medications   Medication Sig     acetaminophen (TYLENOL) 500 MG tablet 500mg tab as needed     amoxicillin (AMOXIL) 500 MG capsule 4 x 500mg tabs by mouth 1 hour before dental appointments.     bisacodyl (DULCOLAX) 5 MG EC tablet Refer to instructions sent via [employer].     calcium carbonate (TUMS) 500 MG chewable tablet 1 tab by mouth in morning or at night as needed     carbidopa-levodopa (SINEMET)  MG tablet Start with 1/2 tab daily x 3days then 1/2 tab twice daily for 3-7 days then 1 tab twice daily for one week then 1 tablet 3 times per day. The dose can be increased if there is no significant benefit. IT may take 1-3 weeks before benefit is seen  To lessen the effects that protein has on the effectiveness of sinemet (i.e.. Levodopa), it is best to take this medication on an empty stomach one hour before or two hours after meals.     If you are experiencing nausea with the medication, then you should take the medication with a CRACKER, GINGER TABLETS OR WITH FOOD. CALL IF STILL HAVING NAUSEA AS WE CAN TRY EXTRA CARBIDOPA, TRIMETHOBENZAMIDE ETC.     Besides nausea there are other side effects including the rare occurrence of a rash to the yellow dye in the sinemet tablets if you are taking the 25/100 formulation.     Iron may interfere with the effectiveness of this medication so do not take iron supplements at the same time you are taking sinemet.     cephALEXin (KEFLEX) 750 MG capsule Take 1 capsule by mouth daily (Patient not taking: Reported on 8/17/2021)     docusate sodium (COLACE) 50 MG capsule Take 1 capsule (50 mg) by mouth 2 times daily as needed for constipation     dutasteride (AVODART) 0.5 MG capsule Take 1 capsule (0.5 mg) by mouth daily      flecainide (TAMBOCOR) 50 MG tablet Take 1 tablet (50 mg) by mouth 2 times daily     fluticasone (FLONASE) 50 MCG/ACT nasal spray Spray 1 spray into both nostrils daily as needed for rhinitis     HYDROcodone-acetaminophen (NORCO) 5-325 MG tablet Take 1 tablet by mouth daily     loratadine (CLARITIN) 10 MG tablet Take 10 mg by mouth daily as needed      metoprolol succinate ER (TOPROL XL) 25 MG 24 hr tablet Take 1 tablet (25 mg) by mouth daily In the evening     multivitamin (CENTRUM SILVER) tablet Take 1 tablet by mouth daily     Nutritional Supplements (ENSURE PO)      polyethylene glycol (MIRALAX) 17 g packet Take 1 packet by mouth daily as needed for constipation     PREVIDENT 5000 DRY MOUTH 1.1 % GEL topical gel Take by mouth daily     senna-docusate (SENOKOT-S/PERICOLACE) 8.6-50 MG tablet Take 2 tablets by mouth daily     simvastatin (ZOCOR) 10 MG tablet Take 1 tablet (10 mg) by mouth daily     vitamin D3 (CHOLECALCIFEROL) 50 mcg (2000 units) tablet Take 1 tablet (50 mcg) by mouth daily     warfarin ANTICOAGULANT (COUMADIN) 2.5 MG tablet TAKE 1.25 MG (1/2 TABLET) MON/FRI AND 2.5 MG (1 TABLET) ALL OTHER DAYS. NEED APPT. (Patient taking differently: Taking 2.5mg daily. Fridays 3.75mg)      Physical Exam:   Vitals: /81   Pulse 78   Resp 16   SpO2 96%    General: Seated comfortably in no acute distress.  HEENT: Neck musculature is tight with limited movements of all motions (flexion, extension, side-bending, rotation) No paracervical muscle tenderness or tightness.     Skin: No rashes  Neurologic:     Mental Status: Fully alert, attentive and oriented. Speech clear and fluent but hypophonic at times, no paraphasic errors.     Cranial Nerves: Visual fields intact to threat. PERRL. EOMI with normal smooth pursuit. Facial sensation intact/symmetric. Facial movements symmetric. Hearing not formally tested but intact to conversation. Palate elevation symmetric, uvula midline. No dysarthria. Shoulder shrug  strong bilaterally. Tongue protrusion midline.     Motor: No resting tremor observed, increased muscle rigidity in upper and lower extremities. No pronator drift.  Some loss of muscular bulk in most major muscle groups of the legs (quads, hamstrings, gastrocnemius, and especially the left anterior tibialis).  Motor: R/L  Upper:  Shoulder abduction, Deltoid (Axillary n), C5,6: 5/5  Elbow flexion, Bicep (Musculocutaneous n), C5,6: 5/5  Elbow extension, Tricep (Radial n), C6,7,8: 5/5  Wrist Flexion, Flexor carpi radialis, (Median), C6, 7: 5/5  Wrist Extension, Extensor carpi radialis longus (Radial), C6,7: 5/5  Finger Flexion, flexor pollicis longus (Anterior interosseus n), C7, 8: 5/5  Finger Extension, extensor indicis proprius (radial), C8: 5/5  Finger abduction:   Abductor digiti minimi (ulnar), C8, T1: 5/5   Abductor pollicis brevis (median), C8, T1: 5/5   Lower Extremities  Hip Flexion, Iliopsoas, L1.2: 5/5  Hip Adduction, Adductors, (Obdurator n), L2.3: 5/5  Knee Flexion, Hamstrings (Sciatic n), S1: 5/5  Knee Extension, Quadriceps (Femoral n), L3.4: 5/5  Ankle Dorsiflexion, Tibialis anterior (Deep peroneal n), L4: 5/3  Ankle Plantarflexion, Gastrocnemius, Soleus (Tibial n), S1.2: 5/5  Inversion, in plantar flexed position (posterior tibialis from tibial, L4-5): 5/5  Eversion in dorsiflexed position (anterior tibialis, peroneal, L4-5): 5/4  Great toe extension: 5/4  Great toe flexion: 5/5     Deep Tendon Reflexes: 2+/symmetric throughout upper extremities. Absent patellar and achilles b/l. No clonus. Toes mute.     Sensory: Intact/symmetric to light touch, pinprick, temperature, vibration and proprioception throughout upper extremities.  Absent differentiation to light touch and pinprick in toes, ankles, and mid-leg. Absent vibration in toes, ankles, and mid leg but present (8 seconds) at knees b/l. Absent proprioception in great toes but present in ankles. Positiev Romberg.      Coordination:  Finger-nose-fingerwithout dysmetria. Rapid alternating movements intact but slowed at faster paces and become choppy with stiffened hand musculature.     Gait: Bends forward to stand when hands across chest. Stance with heels w/in 6 inches. Foot slap heard on left. Minimal arm movements with slightly stooped posture.         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  On top of these findings, he has loss of vibration and proprioceptive sensation from a distal to proximal gradient in his lower extremities.  I suspect his foot drop on the left is related to a peroneal neurpathy, and likely from a compression at the knee given his weight loss and continued crossing of his legs while sitting in a chair at home.  Further EMG will be helpful to define the severity of this injury.  Ongoing PT to aide in strength and conditioning will also be useful.  His neuropathy is longstanding and seems to be worsening when comparing prior exams and studies from a decade ago.  I suspect ongoing diabetes, along with prior chemotherapy is causing his symptoms.  Pending results, he may benefit from neuropathy serum studies in the future.    Plan:  EMG left lower extremity  PT for left foot drop  Utilize AFO for longer distances and steps    Follow up in Neurology clinic in 6 months or should new concerns arise.    BLAINE Coon D.O.   of Neurology    Total time  today (54 min) in this patient encounter was spent on pre-charting, counseling and/or coordination of care. We reviewed diagnostic results, impressions, and discussed other possible tests if symptoms do not improve. We discussed the implications of the diagnosis, as well as risks and benefits of management options. We reviewed treatment  instructions and our scheduled follow-up as specified in the discharge plan. We also discussed the importance of compliance with the chosen course of treatment. The patient is in agreement with this plan and has no further questions.    Again, thank you for allowing me to participate in the care of your patient.      Sincerely,    Michael Coon, DO

## 2021-08-18 NOTE — PATIENT INSTRUCTIONS
I suspect your weight loss led to some issues with your left leg peroneal nerve being easier to compress when you cross your legs.  This should improve over time, but I would try to avoid crossing your legs further.  To define how severe your injury is, you should obtain an EMG.  Otherwise, I will have you follow up with physical therapy for strength and conditioning regarding this foot drop.      The EMG will also be able to define you neuropathy, which I suspect is multi-factorial (related to chemotherapy, diabetes).

## 2021-08-18 NOTE — PROGRESS NOTES
[facility] Neurology Consultation    Haresh Rock MRN# [id number]   Age: 71 year old YOB: 1950     Requesting physician: Anya Houser     Reason for Consultation: foot drop (left)      History of Presenting Symptoms:   Haresh Rock is a 71 year old male who presents today for evaluation of Foot drop (left). 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  The patient is followed with our movement disorder specialist, Dr. Hill, for his PD and on 6/10/2021 it was noted he had an ongoing left foot drop that was subacute in nature.    Upon chart review, the patient did have an EMG 6/22/2009 for numbness in his toes.  Results indicated a non-diagnostic study.    Today, the patient reports having sensory issues of the legs since 2011 when he was taking prednisone following his bone marrow transplant.  He notes that he developed numbness below his ankles and in the toes, but no pain around that time.  There is a tingling feeling that accompanies his numbness, which worsens when he rests his feet and improves with walking.  He never had any radiating pain down his legs into his feet or local trauma to his legs.  Around 05/2021, he noted that his left foot hit his step when bringing in groceries.  Since that time, he has noted worsened abilities to dorsiflex his foot on the left.  This has led to him hearing his floot slap on the ground when walking in the mall, and a few trips (no falls).  The patient crosses his legs often when sitting in the lazy-boy at home.  He has noted having a near 50lbs weight loss over the last year.     Medications:     Current Outpatient Medications   Medication Sig     acetaminophen  (TYLENOL) 500 MG tablet 500mg tab as needed     amoxicillin (AMOXIL) 500 MG capsule 4 x 500mg tabs by mouth 1 hour before dental appointments.     bisacodyl (DULCOLAX) 5 MG EC tablet Refer to instructions sent via [email or web address]     calcium carbonate (TUMS) 500 MG chewable tablet 1 tab by mouth in morning or at night as needed     carbidopa-levodopa (SINEMET)  MG tablet Start with 1/2 tab daily x 3days then 1/2 tab twice daily for 3-7 days then 1 tab twice daily for one week then 1 tablet 3 times per day. The dose can be increased if there is no significant benefit. IT may take 1-3 weeks before benefit is seen  To lessen the effects that protein has on the effectiveness of sinemet (i.e.. Levodopa), it is best to take this medication on an empty stomach one hour before or two hours after meals.     If you are experiencing nausea with the medication, then you should take the medication with a CRACKER, GINGER TABLETS OR WITH FOOD. CALL IF STILL HAVING NAUSEA AS WE CAN TRY EXTRA CARBIDOPA, TRIMETHOBENZAMIDE ETC.     Besides nausea there are other side effects including the rare occurrence of a rash to the yellow dye in the sinemet tablets if you are taking the 25/100 formulation.     Iron may interfere with the effectiveness of this medication so do not take iron supplements at the same time you are taking sinemet.     cephALEXin (KEFLEX) 750 MG capsule Take 1 capsule by mouth daily (Patient not taking: Reported on 8/17/2021)     docusate sodium (COLACE) 50 MG capsule Take 1 capsule (50 mg) by mouth 2 times daily as needed for constipation     dutasteride (AVODART) 0.5 MG capsule Take 1 capsule (0.5 mg) by mouth daily     flecainide (TAMBOCOR) 50 MG tablet Take 1 tablet (50 mg) by mouth 2 times daily     fluticasone (FLONASE) 50 MCG/ACT nasal spray Spray 1 spray into both nostrils daily as needed for rhinitis     HYDROcodone-acetaminophen (NORCO) 5-325 MG tablet Take 1 tablet by mouth daily     loratadine (CLARITIN) 10  MG tablet Take 10 mg by mouth daily as needed      metoprolol succinate ER (TOPROL XL) 25 MG 24 hr tablet Take 1 tablet (25 mg) by mouth daily In the evening     multivitamin (CENTRUM SILVER) tablet Take 1 tablet by mouth daily     Nutritional Supplements (ENSURE PO)      polyethylene glycol (MIRALAX) 17 g packet Take 1 packet by mouth daily as needed for constipation     PREVIDENT 5000 DRY MOUTH 1.1 % GEL topical gel Take by mouth daily     senna-docusate (SENOKOT-S/PERICOLACE) 8.6-50 MG tablet Take 2 tablets by mouth daily     simvastatin (ZOCOR) 10 MG tablet Take 1 tablet (10 mg) by mouth daily     vitamin D3 (CHOLECALCIFEROL) 50 mcg (2000 units) tablet Take 1 tablet (50 mcg) by mouth daily     warfarin ANTICOAGULANT (COUMADIN) 2.5 MG tablet TAKE 1.25 MG (1/2 TABLET) MON/FRI AND 2.5 MG (1 TABLET) ALL OTHER DAYS. NEED APPT. (Patient taking differently: Taking 2.5mg daily. Fridays 3.75mg)      Physical Exam:   Vitals: /81   Pulse 78   Resp 16   SpO2 96%    General: Seated comfortably in no acute distress.  HEENT: Neck musculature is tight with limited movements of all motions (flexion, extension, side-bending, rotation) No paracervical muscle tenderness or tightness.     Skin: No rashes  Neurologic:     Mental Status: Fully alert, attentive and oriented. Speech clear and fluent but hypophonic at times, no paraphasic errors.     Cranial Nerves: Visual fields intact to threat. PERRL. EOMI with normal smooth pursuit. Facial sensation intact/symmetric. Facial movements symmetric. Hearing not formally tested but intact to conversation. Palate elevation symmetric, uvula midline. No dysarthria. Shoulder shrug strong bilaterally. Tongue protrusion midline.     Motor: No resting tremor observed, increased muscle rigidity in upper and lower extremities. No pronator drift.  Some loss of muscular bulk in most major muscle groups of the legs (quads, hamstrings, gastrocnemius, and especially the left anterior  tibialis).  Motor: R/L  Upper:  Shoulder abduction, Deltoid (Axillary n), C5,6: 5/5  Elbow flexion, Bicep (Musculocutaneous n), C5,6: 5/5  Elbow extension, Tricep (Radial n), C6,7,8: 5/5  Wrist Flexion, Flexor carpi radialis, (Median), C6, 7: 5/5  Wrist Extension, Extensor carpi radialis longus (Radial), C6,7: 5/5  Finger Flexion, flexor pollicis longus (Anterior interosseus n), C7, 8: 5/5  Finger Extension, extensor indicis proprius (radial), C8: 5/5  Finger abduction:   Abductor digiti minimi (ulnar), C8, T1: 5/5   Abductor pollicis brevis (median), C8, T1: 5/5   Lower Extremities  Hip Flexion, Iliopsoas, L1.2: 5/5  Hip Adduction, Adductors, (Obdurator n), L2.3: 5/5  Knee Flexion, Hamstrings (Sciatic n), S1: 5/5  Knee Extension, Quadriceps (Femoral n), L3.4: 5/5  Ankle Dorsiflexion, Tibialis anterior (Deep peroneal n), L4: 5/3  Ankle Plantarflexion, Gastrocnemius, Soleus (Tibial n), S1.2: 5/5  Inversion, in plantar flexed position (posterior tibialis from tibial, L4-5): 5/5  Eversion in dorsiflexed position (anterior tibialis, peroneal, L4-5): 5/4  Great toe extension: 5/4  Great toe flexion: 5/5     Deep Tendon Reflexes: 2+/symmetric throughout upper extremities. Absent patellar and achilles b/l. No clonus. Toes mute.     Sensory: Intact/symmetric to light touch, pinprick, temperature, vibration and proprioception throughout upper extremities.  Absent differentiation to light touch and pinprick in toes, ankles, and mid-leg. Absent vibration in toes, ankles, and mid leg but present (8 seconds) at knees b/l. Absent proprioception in great toes but present in ankles. Positiev Romberg.      Coordination: Finger-nose-fingerwithout dysmetria. Rapid alternating movements intact but slowed at faster paces and become choppy with stiffened hand musculature.     Gait: Bends forward to stand when hands across chest. Stance with heels w/in 6 inches. Foot slap heard on left. Minimal arm movements with slightly stooped  posture.         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  On top of these findings, he has loss of vibration and proprioceptive sensation from a distal to proximal gradient in his lower extremities.  I suspect his foot drop on the left is related to a peroneal neurpathy, and likely from a compression at the knee given his weight loss and continued crossing of his legs while sitting in a chair at home.  Further EMG will be helpful to define the severity of this injury.  Ongoing PT to aide in strength and conditioning will also be useful.  His neuropathy is longstanding and seems to be worsening when comparing prior exams and studies from a decade ago.  I suspect ongoing diabetes, along with prior chemotherapy is causing his symptoms.  Pending results, he may benefit from neuropathy serum studies in the future.    Plan:  EMG left lower extremity  PT for left foot drop  Utilize AFO for longer distances and steps    Follow up in Neurology clinic in 6 months or should new concerns arise.    BLAINE Coon D.O.   of Neurology    Total time  today (54 min) in this patient encounter was spent on pre-charting, counseling and/or coordination of care. We reviewed diagnostic results, impressions, and discussed other possible tests if symptoms do not improve. We discussed the implications of the diagnosis, as well as risks and benefits of management options. We reviewed treatment instructions and our scheduled follow-up as specified in the discharge plan. We also discussed the importance of compliance with the chosen course of treatment. The patient is in agreement with this plan and has no further questions.

## 2021-08-19 ENCOUNTER — TELEPHONE (OUTPATIENT)
Dept: FAMILY MEDICINE | Facility: CLINIC | Age: 71
End: 2021-08-19

## 2021-08-19 ENCOUNTER — TRANSFERRED RECORDS (OUTPATIENT)
Dept: HEALTH INFORMATION MANAGEMENT | Facility: CLINIC | Age: 71
End: 2021-08-19

## 2021-08-19 ENCOUNTER — LAB (OUTPATIENT)
Dept: LAB | Facility: CLINIC | Age: 71
End: 2021-08-19
Payer: COMMERCIAL

## 2021-08-19 ENCOUNTER — HOSPITAL ENCOUNTER (INPATIENT)
Facility: CLINIC | Age: 71
LOS: 2 days | Discharge: HOME | DRG: 871 | End: 2021-08-21
Attending: STUDENT IN AN ORGANIZED HEALTH CARE EDUCATION/TRAINING PROGRAM | Admitting: INTERNAL MEDICINE
Payer: COMMERCIAL

## 2021-08-19 ENCOUNTER — APPOINTMENT (OUTPATIENT)
Dept: GENERAL RADIOLOGY | Facility: CLINIC | Age: 71
DRG: 871 | End: 2021-08-19
Attending: STUDENT IN AN ORGANIZED HEALTH CARE EDUCATION/TRAINING PROGRAM
Payer: COMMERCIAL

## 2021-08-19 DIAGNOSIS — R05.9 COUGH: ICD-10-CM

## 2021-08-19 DIAGNOSIS — N39.0 URINARY TRACT INFECTION WITH HEMATURIA, SITE UNSPECIFIED: ICD-10-CM

## 2021-08-19 DIAGNOSIS — J69.0 ASPIRATION PNEUMONIA, UNSPECIFIED ASPIRATION PNEUMONIA TYPE, UNSPECIFIED LATERALITY, UNSPECIFIED PART OF LUNG (H): ICD-10-CM

## 2021-08-19 DIAGNOSIS — T17.908A ASPIRATION INTO RESPIRATORY TRACT, INITIAL ENCOUNTER: ICD-10-CM

## 2021-08-19 DIAGNOSIS — R06.02 SHORTNESS OF BREATH: Primary | ICD-10-CM

## 2021-08-19 DIAGNOSIS — R70.0 ELEVATED ERYTHROCYTE SEDIMENTATION RATE: ICD-10-CM

## 2021-08-19 DIAGNOSIS — I50.21 ACUTE SYSTOLIC HEART FAILURE (H): ICD-10-CM

## 2021-08-19 DIAGNOSIS — J69.0 ASPIRATION PNEUMONITIS (H): ICD-10-CM

## 2021-08-19 DIAGNOSIS — G20.A1 PARKINSON'S DISEASE (H): ICD-10-CM

## 2021-08-19 DIAGNOSIS — R06.02 SHORTNESS OF BREATH: ICD-10-CM

## 2021-08-19 DIAGNOSIS — R31.9 URINARY TRACT INFECTION WITH HEMATURIA, SITE UNSPECIFIED: ICD-10-CM

## 2021-08-19 DIAGNOSIS — R79.89 ELEVATED TROPONIN: ICD-10-CM

## 2021-08-19 LAB
ALBUMIN SERPL-MCNC: 2.7 G/DL (ref 3.4–5)
ALP SERPL-CCNC: 153 U/L (ref 40–150)
ALT SERPL W P-5'-P-CCNC: 27 U/L (ref 0–70)
ANION GAP SERPL CALCULATED.3IONS-SCNC: 10 MMOL/L (ref 3–14)
ANION GAP SERPL CALCULATED.3IONS-SCNC: 5 MMOL/L (ref 3–14)
AST SERPL W P-5'-P-CCNC: 59 U/L (ref 0–45)
BACTERIA SPT CULT: NORMAL
BILIRUB SERPL-MCNC: 1 MG/DL (ref 0.2–1.3)
BUN SERPL-MCNC: 32 MG/DL (ref 7–30)
BUN SERPL-MCNC: 32 MG/DL (ref 7–30)
CALCIUM SERPL-MCNC: 8.1 MG/DL (ref 8.5–10.1)
CALCIUM SERPL-MCNC: 8.2 MG/DL (ref 8.5–10.1)
CHLORIDE BLD-SCNC: 103 MMOL/L (ref 94–109)
CHLORIDE BLD-SCNC: 104 MMOL/L (ref 94–109)
CO2 SERPL-SCNC: 24 MMOL/L (ref 20–32)
CO2 SERPL-SCNC: 28 MMOL/L (ref 20–32)
CREAT SERPL-MCNC: 1.09 MG/DL (ref 0.66–1.25)
CREAT SERPL-MCNC: 1.15 MG/DL (ref 0.66–1.25)
ERYTHROCYTE [DISTWIDTH] IN BLOOD BY AUTOMATED COUNT: 12.7 % (ref 10–15)
ERYTHROCYTE [DISTWIDTH] IN BLOOD BY AUTOMATED COUNT: 12.9 % (ref 10–15)
GFR SERPL CREATININE-BSD FRML MDRD: 63 ML/MIN/1.73M2
GFR SERPL CREATININE-BSD FRML MDRD: 67 ML/MIN/1.73M2
GLUCOSE BLD-MCNC: 139 MG/DL (ref 70–99)
GLUCOSE BLD-MCNC: 188 MG/DL (ref 70–99)
GRAM STAIN RESULT: NORMAL
HCT VFR BLD AUTO: 37.2 % (ref 40–53)
HCT VFR BLD AUTO: 41.3 % (ref 40–53)
HGB BLD-MCNC: 13.3 G/DL (ref 13.3–17.7)
HGB BLD-MCNC: 14.2 G/DL (ref 13.3–17.7)
HOLD SPECIMEN: NORMAL
HOLD SPECIMEN: NORMAL
INR PPP: 2.98 (ref 0.85–1.15)
INR PPP: 3.34 (ref 0.85–1.15)
MCH RBC QN AUTO: 31.7 PG (ref 26.5–33)
MCH RBC QN AUTO: 32.2 PG (ref 26.5–33)
MCHC RBC AUTO-ENTMCNC: 34.4 G/DL (ref 31.5–36.5)
MCHC RBC AUTO-ENTMCNC: 35.8 G/DL (ref 31.5–36.5)
MCV RBC AUTO: 90 FL (ref 78–100)
MCV RBC AUTO: 92 FL (ref 78–100)
PLATELET # BLD AUTO: 113 10E3/UL (ref 150–450)
PLATELET # BLD AUTO: 122 10E3/UL (ref 150–450)
POTASSIUM BLD-SCNC: 3.5 MMOL/L (ref 3.4–5.3)
POTASSIUM BLD-SCNC: 3.7 MMOL/L (ref 3.4–5.3)
PROT SERPL-MCNC: 7.2 G/DL (ref 6.8–8.8)
RBC # BLD AUTO: 4.13 10E6/UL (ref 4.4–5.9)
RBC # BLD AUTO: 4.48 10E6/UL (ref 4.4–5.9)
SODIUM SERPL-SCNC: 136 MMOL/L (ref 133–144)
SODIUM SERPL-SCNC: 138 MMOL/L (ref 133–144)
TROPONIN I SERPL-MCNC: 0.59 UG/L (ref 0–0.04)
TROPONIN I SERPL-MCNC: 0.66 UG/L (ref 0–0.04)
TROPONIN I SERPL-MCNC: 2.38 UG/L (ref 0–0.04)
WBC # BLD AUTO: 10 10E3/UL (ref 4–11)
WBC # BLD AUTO: 16.4 10E3/UL (ref 4–11)

## 2021-08-19 PROCEDURE — 71046 X-RAY EXAM CHEST 2 VIEWS: CPT

## 2021-08-19 PROCEDURE — 250N000011 HC RX IP 250 OP 636: Performed by: NURSE PRACTITIONER

## 2021-08-19 PROCEDURE — 96365 THER/PROPH/DIAG IV INF INIT: CPT | Performed by: STUDENT IN AN ORGANIZED HEALTH CARE EDUCATION/TRAINING PROGRAM

## 2021-08-19 PROCEDURE — 87206 SMEAR FLUORESCENT/ACID STAI: CPT | Mod: 90

## 2021-08-19 PROCEDURE — 250N000013 HC RX MED GY IP 250 OP 250 PS 637: Performed by: STUDENT IN AN ORGANIZED HEALTH CARE EDUCATION/TRAINING PROGRAM

## 2021-08-19 PROCEDURE — 93010 ELECTROCARDIOGRAM REPORT: CPT | Mod: 76 | Performed by: STUDENT IN AN ORGANIZED HEALTH CARE EDUCATION/TRAINING PROGRAM

## 2021-08-19 PROCEDURE — 82374 ASSAY BLOOD CARBON DIOXIDE: CPT | Performed by: NURSE PRACTITIONER

## 2021-08-19 PROCEDURE — 93010 ELECTROCARDIOGRAM REPORT: CPT | Performed by: STUDENT IN AN ORGANIZED HEALTH CARE EDUCATION/TRAINING PROGRAM

## 2021-08-19 PROCEDURE — 84484 ASSAY OF TROPONIN QUANT: CPT | Performed by: NURSE PRACTITIONER

## 2021-08-19 PROCEDURE — 99285 EMERGENCY DEPT VISIT HI MDM: CPT | Mod: 25 | Performed by: STUDENT IN AN ORGANIZED HEALTH CARE EDUCATION/TRAINING PROGRAM

## 2021-08-19 PROCEDURE — 71046 X-RAY EXAM CHEST 2 VIEWS: CPT | Mod: 26 | Performed by: RADIOLOGY

## 2021-08-19 PROCEDURE — 84484 ASSAY OF TROPONIN QUANT: CPT | Performed by: STUDENT IN AN ORGANIZED HEALTH CARE EDUCATION/TRAINING PROGRAM

## 2021-08-19 PROCEDURE — 96375 TX/PRO/DX INJ NEW DRUG ADDON: CPT | Performed by: STUDENT IN AN ORGANIZED HEALTH CARE EDUCATION/TRAINING PROGRAM

## 2021-08-19 PROCEDURE — 93005 ELECTROCARDIOGRAM TRACING: CPT | Mod: 76 | Performed by: STUDENT IN AN ORGANIZED HEALTH CARE EDUCATION/TRAINING PROGRAM

## 2021-08-19 PROCEDURE — 99222 1ST HOSP IP/OBS MODERATE 55: CPT | Mod: AI | Performed by: INTERNAL MEDICINE

## 2021-08-19 PROCEDURE — 93005 ELECTROCARDIOGRAM TRACING: CPT | Performed by: STUDENT IN AN ORGANIZED HEALTH CARE EDUCATION/TRAINING PROGRAM

## 2021-08-19 PROCEDURE — 36415 COLL VENOUS BLD VENIPUNCTURE: CPT | Performed by: NURSE PRACTITIONER

## 2021-08-19 PROCEDURE — 82040 ASSAY OF SERUM ALBUMIN: CPT | Performed by: NURSE PRACTITIONER

## 2021-08-19 PROCEDURE — 85027 COMPLETE CBC AUTOMATED: CPT | Performed by: NURSE PRACTITIONER

## 2021-08-19 PROCEDURE — 36415 COLL VENOUS BLD VENIPUNCTURE: CPT | Performed by: STUDENT IN AN ORGANIZED HEALTH CARE EDUCATION/TRAINING PROGRAM

## 2021-08-19 PROCEDURE — 99291 CRITICAL CARE FIRST HOUR: CPT | Mod: 25 | Performed by: STUDENT IN AN ORGANIZED HEALTH CARE EDUCATION/TRAINING PROGRAM

## 2021-08-19 PROCEDURE — 85610 PROTHROMBIN TIME: CPT | Performed by: NURSE PRACTITIONER

## 2021-08-19 PROCEDURE — 250N000013 HC RX MED GY IP 250 OP 250 PS 637: Performed by: NURSE PRACTITIONER

## 2021-08-19 PROCEDURE — 85610 PROTHROMBIN TIME: CPT | Performed by: INTERNAL MEDICINE

## 2021-08-19 PROCEDURE — 99207 PR APP CREDIT; MD BILLING SHARED VISIT: CPT | Performed by: NURSE PRACTITIONER

## 2021-08-19 PROCEDURE — 87116 MYCOBACTERIA CULTURE: CPT | Mod: 90

## 2021-08-19 PROCEDURE — 87205 SMEAR GRAM STAIN: CPT

## 2021-08-19 PROCEDURE — 120N000002 HC R&B MED SURG/OB UMMC

## 2021-08-19 PROCEDURE — 99000 SPECIMEN HANDLING OFFICE-LAB: CPT

## 2021-08-19 RX ORDER — AZITHROMYCIN 250 MG/1
500 TABLET, FILM COATED ORAL ONCE
Status: COMPLETED | OUTPATIENT
Start: 2021-08-19 | End: 2021-08-19

## 2021-08-19 RX ORDER — ACETAMINOPHEN 325 MG/1
975 TABLET ORAL EVERY 8 HOURS
Status: DISCONTINUED | OUTPATIENT
Start: 2021-08-19 | End: 2021-08-21 | Stop reason: HOSPADM

## 2021-08-19 RX ORDER — DUTASTERIDE 0.5 MG/1
0.5 CAPSULE, LIQUID FILLED ORAL AT BEDTIME
Status: DISCONTINUED | OUTPATIENT
Start: 2021-08-19 | End: 2021-08-21 | Stop reason: HOSPADM

## 2021-08-19 RX ORDER — BISACODYL 10 MG
10 SUPPOSITORY, RECTAL RECTAL DAILY PRN
Status: DISCONTINUED | OUTPATIENT
Start: 2021-08-19 | End: 2021-08-21 | Stop reason: HOSPADM

## 2021-08-19 RX ORDER — ONDANSETRON 2 MG/ML
4 INJECTION INTRAMUSCULAR; INTRAVENOUS EVERY 6 HOURS PRN
Status: DISCONTINUED | OUTPATIENT
Start: 2021-08-19 | End: 2021-08-21 | Stop reason: HOSPADM

## 2021-08-19 RX ORDER — FLECAINIDE ACETATE 50 MG/1
50 TABLET ORAL 2 TIMES DAILY
Status: DISCONTINUED | OUTPATIENT
Start: 2021-08-19 | End: 2021-08-21 | Stop reason: HOSPADM

## 2021-08-19 RX ORDER — METOPROLOL SUCCINATE 25 MG/1
25 TABLET, EXTENDED RELEASE ORAL AT BEDTIME
Status: DISCONTINUED | OUTPATIENT
Start: 2021-08-19 | End: 2021-08-21 | Stop reason: HOSPADM

## 2021-08-19 RX ORDER — ONDANSETRON 4 MG/1
4 TABLET, ORALLY DISINTEGRATING ORAL EVERY 6 HOURS PRN
Status: DISCONTINUED | OUTPATIENT
Start: 2021-08-19 | End: 2021-08-21 | Stop reason: HOSPADM

## 2021-08-19 RX ORDER — POLYETHYLENE GLYCOL 3350 17 G/17G
17 POWDER, FOR SOLUTION ORAL DAILY PRN
Status: DISCONTINUED | OUTPATIENT
Start: 2021-08-19 | End: 2021-08-21 | Stop reason: HOSPADM

## 2021-08-19 RX ORDER — ASPIRIN 325 MG
325 TABLET ORAL ONCE
Status: COMPLETED | OUTPATIENT
Start: 2021-08-19 | End: 2021-08-19

## 2021-08-19 RX ORDER — SIMVASTATIN 10 MG
10 TABLET ORAL AT BEDTIME
Status: DISCONTINUED | OUTPATIENT
Start: 2021-08-20 | End: 2021-08-20

## 2021-08-19 RX ORDER — AZITHROMYCIN 250 MG/1
250 TABLET, FILM COATED ORAL DAILY
Status: DISCONTINUED | OUTPATIENT
Start: 2021-08-20 | End: 2021-08-21 | Stop reason: HOSPADM

## 2021-08-19 RX ORDER — AMOXICILLIN 250 MG
2 CAPSULE ORAL DAILY
Status: DISCONTINUED | OUTPATIENT
Start: 2021-08-20 | End: 2021-08-21 | Stop reason: HOSPADM

## 2021-08-19 RX ORDER — PROCHLORPERAZINE MALEATE 5 MG
5 TABLET ORAL EVERY 6 HOURS PRN
Status: DISCONTINUED | OUTPATIENT
Start: 2021-08-19 | End: 2021-08-21 | Stop reason: HOSPADM

## 2021-08-19 RX ORDER — LIDOCAINE 40 MG/G
CREAM TOPICAL
Status: DISCONTINUED | OUTPATIENT
Start: 2021-08-19 | End: 2021-08-21 | Stop reason: HOSPADM

## 2021-08-19 RX ORDER — PROCHLORPERAZINE 25 MG
12.5 SUPPOSITORY, RECTAL RECTAL EVERY 12 HOURS PRN
Status: DISCONTINUED | OUTPATIENT
Start: 2021-08-19 | End: 2021-08-21 | Stop reason: HOSPADM

## 2021-08-19 RX ORDER — CEFTRIAXONE 1 G/1
1 INJECTION, POWDER, FOR SOLUTION INTRAMUSCULAR; INTRAVENOUS EVERY 24 HOURS
Status: DISCONTINUED | OUTPATIENT
Start: 2021-08-19 | End: 2021-08-21

## 2021-08-19 RX ADMIN — METOPROLOL SUCCINATE 25 MG: 25 TABLET, EXTENDED RELEASE ORAL at 22:11

## 2021-08-19 RX ADMIN — ASPIRIN 325 MG ORAL TABLET 325 MG: 325 PILL ORAL at 17:50

## 2021-08-19 RX ADMIN — CEFTRIAXONE SODIUM 1 G: 1 INJECTION, POWDER, FOR SOLUTION INTRAMUSCULAR; INTRAVENOUS at 22:12

## 2021-08-19 RX ADMIN — AZITHROMYCIN MONOHYDRATE 500 MG: 250 TABLET ORAL at 22:11

## 2021-08-19 RX ADMIN — FLECAINIDE ACETATE 50 MG: 50 TABLET ORAL at 23:09

## 2021-08-19 RX ADMIN — ACETAMINOPHEN 975 MG: 325 TABLET, FILM COATED ORAL at 22:11

## 2021-08-19 ASSESSMENT — ENCOUNTER SYMPTOMS
SORE THROAT: 1
CHILLS: 1
WEAKNESS: 1
DIARRHEA: 0
TREMORS: 0
DIFFICULTY URINATING: 0
FREQUENCY: 1
SHORTNESS OF BREATH: 0
NAUSEA: 0
NUMBNESS: 1
UNEXPECTED WEIGHT CHANGE: 1
TROUBLE SWALLOWING: 1
VOICE CHANGE: 1
SORE THROAT: 0
FATIGUE: 1
COUGH: 1
VOMITING: 0
FEVER: 1

## 2021-08-19 ASSESSMENT — MIFFLIN-ST. JEOR: SCORE: 1463.4

## 2021-08-19 NOTE — ED TRIAGE NOTES
71YR male patient - arrives to [facility]; seen at urgency ctr for cough, pneumonia.  Receiving zosyn, IV.  Trop. Bump:  0.2.

## 2021-08-19 NOTE — TELEPHONE ENCOUNTER
"Patient states he is being seen by ID because \"they think I inhaled some of my food when I was swallowing.\"  Patient was supposed to drop off a sputum culture for ID today.  Per patient he had a fever of 101.1 last night .  At the time had O2 sats of 97% and BP was 117/77.  Took Tylenol and fever resolved.  Had CP this morning but was as he was getting out of bed.  Denies any associated SOB.  Currently CP is resolved.  Patient will go to  at [facility] this morning for evaluation.  Has a .  Meredith Mares RN  [facility]    "

## 2021-08-19 NOTE — ED PROVIDER NOTES
[facility] ([facility])  August 19, 2021 ED 29  History     Chief Complaint   Patient presents with     Cough     Abnormal Labs     trop:  0.2     The history is provided by the patient and medical records.     Haresh Rock is a 71 year old male with history of type 2 diabetes, CKD, prior DVT, A. fib, antiphospholipid antibody syndrome on warfarin, Parkinson's disease, and prior Hodgkin's lymphoma who presents for further evaluation of abnormal troponin levels.  Patient had presented to the [facility] in [place] complaining of chills, high fever to 101.1  F, cough, sore throat, right rib pain and dizziness that started 4 days ago.  He underwent evaluation with labs, CTA of the chest.  There were concern for right lower lobe pneumonia.  No evidence of pulmonary embolism.    In addition, his UA was notable for greater than 100 RBCs, greater than 100 WBCs, many bacteria, cloudy appearance, large occult blood, positive nitrates and small leukocyte esterase.  He reports recent prostate surgery and 5-week course of indwelling Dill catheter that was removed 1 week ago.  No reports of urinary retention .    He also had a troponin of 0.205.  He had a white blood cell count of 16.8.   Creatinine was elevated at 1.3 and GFR to 54.  His COVID-19 testing was negative.  He was sent to the emergency department for further evaluation.     Here in the ED, patient presents with aspiration pneumonia.  Patient states he has recently had a Dill catheter removed.  Patient states that he did have chest pain when he presented to the [facility] in [place].  However, he states he is no longer having chest pain here.  Patient states that he has received his COVID-19 vaccine and, as stated above, his Covid test was negative.      Epic records reviewed, patient received the Moderna COVID-19 vaccine on 3/29/2021.    PAST MEDICAL HISTORY:   Past Medical History:   Diagnosis Date     Abnormal  dopamine scan (DaTSCAN) 2020 11/04/2020    [phone] 11/3/2020   Narrative & Impression   Examination: Nuclear medicine DATscan for Dopamine Receptor Localization.   Examination: NM Brain Image Tomographic (SPECT) DATscan   Date: 11/3/2020 3:21 PM   Indication: Parkinsonism   Comparison: None   Additional Information: none   Interfering Medications: None   Technique:   The patient initially received 1 ml of Lugol's solution orally prior     Antiphospholipid antibody syndrome (H) 2005    Ravi's     Antiphospholipid antibody syndrome (H)     Ravi's, noted negative per testing re-done in 2008 in hematology note 01/13/2009     Articulation disorder 09/23/2020     Atrial fibrillation (H) 04/2011     Benign prostatic hyperplasia with urinary retention      Cirrhosis (H)      CKD (chronic kidney disease) stage 2, GFR 60-89 ml/min      Diabetes mellitus type II     steroid induced     DJD (degenerative joint disease) of knee     SHAWN, worse on right     DVT (deep venous thrombosis) (H)      ED (erectile dysfunction)      Gallbladder polyp 05/2010     Wscqy-Xpixwa-Bwvh Disease 2007    BMT     Hemochromatosis      Hodgkin's disease NOS     5 cycles of ABVD in 2011     HTN (hypertension)      Hyperlipidaemia LDL goal <100      Multiple thyroid nodules     benign      Myeloproliferative disorder (H)     atypical, [facility]     Parkinson disease (H) 09/24/2020     PE (pulmonary embolism) 11/2004     Polyneuropathy      Primary pulmonary hypertension (H)      Thrombocytopenia (H) 06/08/2021       PAST SURGICAL HISTORY:   Past Surgical History:   Procedure Laterality Date     ANESTHESIA CARDIOVERSION  4/21/2011    Procedure:ANESTHESIA CARDIOVERSION; Surgeon:GENERIC ANESTHESIA PROVIDER; Location:[facility] OR     ARTHROSCOPY KNEE RT/LT      LT     CATARACT IOL, RT/LT  2017     COLONOSCOPY  10/16/2012    Procedure: COLONOSCOPY;  Colonoscopy, screening;  Surgeon: Reg Feliciano MD;  Location: [facility] OR     COLONOSCOPY N/A 4/14/2021     Procedure: COLONOSCOPY;  Surgeon: Reg Holm MD;  Location:  GI     ESOPHAGOSCOPY, GASTROSCOPY, DUODENOSCOPY (EGD), COMBINED N/A 10/7/2020    Procedure: ESOPHAGOGASTRODUODENOSCOPY, WITH BIOPSY;  Surgeon: Raul Sawyer DO;  Location: [facility] OR     H ABLATION FOCAL AFIB  3/5/2013    PVI     H ABLATION FOCAL AFIB  01/01/2018     HC BONE MARROW/STEM TRANSPLANT ALLOGENIC  5/8/2007     INSERT PORT VASCULAR ACCESS       JOINT REPLACEMTN, KNEE RT/LT  3/28/12    SHAWN     VASECTOMY  1990       Past medical history, past surgical history, medications, and allergies were reviewed with the patient. Additional pertinent items: None    FAMILY HISTORY:   Family History   Problem Relation Age of Onset     Hypertension Mother      Cerebrovascular Disease Mother      Breast Cancer Mother      Arrhythmia Brother      Arrhythmia Sister         supraventricular tachycardia     Thyroid Disease Sister      Other - See Comments Son         lives in [place]. no kids and not .     Obsessive Compulsive Disorder Son      Depression Son      Eating Disorder Son         eats when depressed     Obesity Son      Thyroid Cancer Daughter         had thyroid removed     Bipolar Disorder Daughter      Other - See Comments Daughter         lives in [place] - single with one son 9  yrs     Other - See Comments Sister      Alzheimer Disease Father      Diabetes Paternal Aunt      Gastrointestinal Disease Maternal Grandmother         colitis      Parkinsonism Other         2 cousins with parkinson     C.A.D. No family hx of        SOCIAL HISTORY:   Social History     Tobacco Use     Smoking status: Never Smoker     Smokeless tobacco: Never Used   Substance Use Topics     Alcohol use: No     Social history was reviewed with the patient. Additional pertinent items: None      Patient's Medications   New Prescriptions    No medications on file   Previous Medications    ACETAMINOPHEN (TYLENOL) 500 MG TABLET    500mg tab as needed     AMOXICILLIN (AMOXIL) 500 MG CAPSULE    4 x 500mg tabs by mouth 1 hour before dental appointments.    BISACODYL (DULCOLAX) 5 MG EC TABLET    Refer to instructions sent via [email or web address].    CALCIUM CARBONATE (TUMS) 500 MG CHEWABLE TABLET    1 tab by mouth in morning or at night as needed    CARBIDOPA-LEVODOPA (SINEMET)  MG TABLET    Start with 1/2 tab daily x 3days then 1/2 tab twice daily for 3-7 days then 1 tab twice daily for one week then 1 tablet 3 times per day. The dose can be increased if there is no significant benefit. IT may take 1-3 weeks before benefit is seen  To lessen the effects that protein has on the effectiveness of sinemet (i.e.. Levodopa), it is best to take this medication on an empty stomach one hour before or two hours after meals.     If you are experiencing nausea with the medication, then you should take the medication with a CRACKER, GINGER TABLETS OR WITH FOOD. CALL IF STILL HAVING NAUSEA AS WE CAN TRY EXTRA CARBIDOPA, TRIMETHOBENZAMIDE ETC.     Besides nausea there are other side effects including the rare occurrence of a rash to the yellow dye in the sinemet tablets if you are taking the 25/100 formulation.     Iron may interfere with the effectiveness of this medication so do not take iron supplements at the same time you are taking sinemet.    CEPHALEXIN (KEFLEX) 750 MG CAPSULE    Take 1 capsule by mouth daily    DOCUSATE SODIUM (COLACE) 50 MG CAPSULE    Take 1 capsule (50 mg) by mouth 2 times daily as needed for constipation    DUTASTERIDE (AVODART) 0.5 MG CAPSULE    Take 1 capsule (0.5 mg) by mouth daily    FLECAINIDE (TAMBOCOR) 50 MG TABLET    Take 1 tablet (50 mg) by mouth 2 times daily    FLUTICASONE (FLONASE) 50 MCG/ACT NASAL SPRAY    Spray 1 spray into both nostrils daily as needed for rhinitis    HYDROCODONE-ACETAMINOPHEN (NORCO) 5-325 MG TABLET    Take 1 tablet by mouth daily    LORATADINE (CLARITIN) 10 MG TABLET    Take 10 mg by mouth daily as needed     METOPROLOL  SUCCINATE ER (TOPROL XL) 25 MG 24 HR TABLET    Take 1 tablet (25 mg) by mouth daily In the evening    MULTIVITAMIN (CENTRUM SILVER) TABLET    Take 1 tablet by mouth daily    NUTRITIONAL SUPPLEMENTS (ENSURE PO)        POLYETHYLENE GLYCOL (MIRALAX) 17 G PACKET    Take 1 packet by mouth daily as needed for constipation    PREVIDENT 5000 DRY MOUTH 1.1 % GEL TOPICAL GEL    Take by mouth daily    SENNA-DOCUSATE (SENOKOT-S/PERICOLACE) 8.6-50 MG TABLET    Take 2 tablets by mouth daily    SIMVASTATIN (ZOCOR) 10 MG TABLET    Take 1 tablet (10 mg) by mouth daily    VITAMIN D3 (CHOLECALCIFEROL) 50 MCG (2000 UNITS) TABLET    Take 1 tablet (50 mcg) by mouth daily    WARFARIN ANTICOAGULANT (COUMADIN) 2.5 MG TABLET    TAKE 1.25 MG (1/2 TABLET) MON/FRI AND 2.5 MG (1 TABLET) ALL OTHER DAYS. NEED APPT.   Modified Medications    No medications on file   Discontinued Medications    No medications on file          Allergies   Allergen Reactions     Seasonal Allergies         Review of Systems   Constitutional: Positive for chills and fever.   HENT: Positive for sore throat.    Respiratory: Positive for cough.    Cardiovascular: Negative for chest pain.       Physical Exam   BP: 111/69  Pulse: 74  Temp: 98  F (36.7  C)  Resp: 18  Height: 182.9 cm (6')  Weight: 68 kg (150 lb)  SpO2: 97 %      Physical Exam  On exam, this is an elderly white male with some softening of his voice secondary to Parkinson's disease.  Abdomen is soft.  Rest of exam is nonfocal.    ED Course        Procedures            EKG Interpretation:      Interpreted by Lizbet Cruz MD  Time reviewed: 1744  Symptoms at time of EKG: Rising troponin  Rhythm: normal sinus   Rate: Normal  Axis: Normal  Ectopy: none  Conduction: normal  ST Segments/ T Waves: Non-specific ST-T wave changes  Q Waves: none  Comparison to prior: Unchanged from previous earlier today    Clinical Impression: non-specific EKG                   EKG Interpretation:      Interpreted by Lizbet Cruz  MD  Time reviewed: 1711  Symptoms at time of EKG: None   Rhythm: normal sinus   Rate: Normal  Axis: Normal  Ectopy: none  Conduction: normal  ST Segments/ T Waves: Non-specific ST-T wave changes  Q Waves: none  Comparison to prior: Unchanged    Clinical Impression: non-specific EKG              Critical Care Addendum    My initial assessment, based on my review of prehospital provider report, review of nursing observations, review of vital signs, focused history, physical exam, review of cardiac rhythm monitor, 12 lead ECG analysis and and specalist consultation, established that Haresh Rock has and Systemic illness resulting in cardiac injury, which requires immediate intervention, and therefore he is critically ill.     After the initial assessment, the care team initiated multiple lab tests and consulted with Cardiology for possible heparin drip, also ordered and reviewed several serial EKGs and been bedside multiple times evaluating the patient to provide stabilization care. Due to the critical nature of this patient, I reassessed nursing observations, vital signs, physical exam, review of cardiac rhythm monitor, 12 lead ECG analysis, mental status and neurologic status multiple times prior to his disposition.     Time also spent performing documentation, reviewing test results, discussion with consultants and coordination of care.     Critical care time (excluding teaching time and procedures): 35 minutes.           Results for orders placed or performed during the hospital encounter of 08/19/21 (from the past 24 hour(s))   Troponin I   Result Value Ref Range    Troponin I 0.586 (HH) 0.000 - 0.045 ug/L   [facility] Draw    Narrative    The following orders were created for panel order [facility] Draw.  Procedure                               Abnormality         Status                     ---------                               -----------         ------                     Extra Blue Top Tube[[id number]]                               Final result               Extra Purple Top Tube[[id number]]                            Final result                 Please view results for these tests on the individual orders.   Extra Blue Top Tube   Result Value Ref Range    Hold Specimen JIC    Extra Purple Top Tube   Result Value Ref Range    Hold Specimen JIC    EKG 12 lead   Result Value Ref Range    Systolic Blood Pressure  mmHg    Diastolic Blood Pressure  mmHg    Ventricular Rate 64 BPM    Atrial Rate 64 BPM    OR Interval 188 ms    QRS Duration 80 ms     ms    QTc 435 ms    P Axis 60 degrees    R AXIS -12 degrees    T Axis 29 degrees    Interpretation ECG Sinus rhythm  Normal ECG      EKG 12 lead   Result Value Ref Range    Systolic Blood Pressure  mmHg    Diastolic Blood Pressure  mmHg    Ventricular Rate 64 BPM    Atrial Rate 64 BPM    OR Interval 180 ms    QRS Duration 84 ms     ms    QTc 441 ms    P Axis 52 degrees    R AXIS 27 degrees    T Axis 46 degrees    Interpretation ECG Sinus rhythm  Normal ECG      Chest XR,  PA & LAT    Impression    RESIDENT PRELIMINARY INTERPRETATION  IMPRESSION:   Airspace opacities in the bilateral lower lung zones, suspicious for  infection.   Troponin I   Result Value Ref Range    Troponin I 0.660 (HH) 0.000 - 0.045 ug/L     *Note: Due to a large number of results and/or encounters for the requested time period, some results have not been displayed. A complete set of results can be found in Results Review.     Medications   aspirin (ASA) tablet 325 mg (325 mg Oral Given 8/19/21 1750)             Assessments & Plan (with Medical Decision Making)   Patient was given Zosyn at outside hospital.  No sepsis criteria at this time.  Has evidence of concomitant UTI on top of his aspiration pneumonia.  As I am unable to see the images for his CTA of the chest I will order a chest x-ray here.  Notified his troponin is rising and is currently at 0.58.  Denies anginal equivalents.  The patient's repeat  EKG is stable.  Will discuss with cardiology for possible initiation of heparin drip.  Believe likely type II NSTEMI secondary to demand from aspiration pneumonia.  Will be admitted to the hospital for further work-up.    To find that troponin continues to rise at 0.66, he continues to deny anginal equivalents and has stable ECG, monitor readings.  Will repage cardiology for evaluation.  Vitals otherwise stable.    Admitted to the hospitalist, at this time pending return call from cardiology to discuss heparin drip.  If troponin continues to rise, will cover for possible type I NSTEMI without EKG changes with heparin drip.    Spoke to cardiology fellow, he will come and evaluate the patient, make formal recommendations for continued inpatient stay, no indication for heparin drip at this time.    I have reviewed the nursing notes.    I have reviewed the findings, diagnosis, plan and need for follow up with the patient.    New Prescriptions    No medications on file       Final diagnoses:   Shortness of breath   Aspiration pneumonia, unspecified aspiration pneumonia type, unspecified laterality, unspecified part of lung (H)   Cough   Elevated troponin   Parkinson's disease (H)   Urinary tract infection with hematuria, site unspecified   IShannan, am serving as a trained medical scribe to document services personally performed by Lizbet Cruz MD, based on the provider's statements to me.  I, Lizbet Cruz MD, was physically present and have reviewed and verified the accuracy of this note documented by Shannan Crystal.      Lizbet Cruz MD  8/19/2021   [facility] EMERGENCY DEPARTMENT

## 2021-08-19 NOTE — ED NOTES
Bed: IN  Expected date: 8/19/21  Expected time: 4:00 PM  Means of arrival: Ambulance  Comments:  CC: BEBA (asp vs summer), covid neg, Zosyn @  ETA: 4p?     Sender/Contact Info: Urgent Care           Plan: eval/direct admit from ED     Call taken by: Darlene    Patient Name:  Haresh Treadwell ([id number])       Jerry Colon MD  08/19/21 6795

## 2021-08-19 NOTE — H&P
[facility]    History and Physical - Hospitalist Service, Gold Night       Date of Admission:  8/19/2021    Assessment & Plan      Haresh Rock is a 71 year old man admitted on 8/19/2021. He has a history of diet-controlled diabetes type 2, CKD, DVT, A. fib ablation, antiphospholipid antibody syndrome, Parkinson's disease and Hodgkin's lymphoma admitted with elevated troponin and pneumonia.  He is s/p TURP one week prior.    1) CAP, Suspected aspiration pneumonia - The patient presents with fevers, rigors hemoptysis and a chest x-ray consistent with pneumonia.  He has a history of Parkinson's and known dysphagia.  Labs from outside facility today notable for new leukocytosis.  -Will start on ceftriaxone, azithromycin.  Already received a dose of Zosyn at urgent care earlier today  -We will consult speech to see the patient while he is here.  He is not currently on a diet restriction but this might be necessary.  -Please follow blood cultures, sputum cultures    2) Severe malnutrition - The patient has temporal wasting and significant weight loss over the course of the year likely secondary to dysphagia and his Parkinson's.  -Consult nutrition    3) Troponin elevation - The patient had an episode of chest pain last night associated with deep breathing and hemoptysis but no further chest pain today.  Troponins progressed from ~0.2 at 1100 at an outside facility to 0.586 and 0.66 here six hours later.  EKG today without evidence for ischemia.  He has no history CAD.  -- Already received ASA.  Already anticoagulated on coumadin.  - I spoke with the cardiology team and there is no acute indication for intervention in the setting of suspected demand ischemia.  We will continue to trend troponins this evening.  If he has any recurrent chest pain will need to obtain a stat EKG and potentially involve their service if there is any evidence for ischemic change.  - Tele  monitoring  - Echo in the am    4) History of diabetes type 2  -Diet controlled    5) History of DVT, atrial fibrillation, antiphospholipid antibody syndrome  -Continue home Coumadin, pharmacy to dose  -- Continue home flecainide    6) History of Parkinson's  - Not currently on carbidopa-levodopa       Diet:   Regular  DVT Prophylaxis: Warfarin  Dill Catheter: Not present  Central Lines: None  Code Status:   Full    Clinically Significant Risk Factors Present on Admission             # Coagulation Defect: home medication list includes an anticoagulant medication       Disposition Plan   Expected discharge: 8/25 recommended to prior living arrangement once SIRS/Sepsis treated.     The patient's care was discussed with the Attending Physician, Dr. García.    DEJAH Munoz [facility]  Securely message with the Vocera Web Console (learn more here)  Text page via [employer] Paging/Directory  Please see sign in/sign out for up to date coverage information    ______________________________________________________________________    Chief Complaint   Chest pain, hemoptysis, fevers    History is obtained from the patient    History of Present Illness   Haresh Rock is a 71 year old man admitted on 8/19/2021. He has a history of diet-controlled diabetes type 2, CKD, DVT, A. fib ablation, antiphospholipid antibody syndrome, Parkinson's disease and Hodgkin's lymphoma admitted with elevated troponin and pneumonia.  He is s/p TURP one week prior to admission.    The patient reports a number of concerns.  He has history of Parkinson's with substantial weight loss this year.  He has known dysphagia and while his diet is not restricted, he has been told multiple times that he is likely aspirating.  He has been following with infectious disease for suspected aspiration and working with speech and swallow therapy. Over the past week he has had multiple episodes of rigors as  well as hemoptysis.  Last night he also had an episode of right-sided chest pain which worsened with movement.    Today his chest pain feels much better and he has not had any further episodes of hemoptysis.  He was seen at an urgent care and then referred to our emergency department for further evaluation.      Review of Systems    The 10 point Review of Systems is negative other than noted in the HPI or here.     Past Medical History    I have reviewed this patient's medical history and updated it with pertinent information if needed.   Past Medical History:   Diagnosis Date     Abnormal dopamine scan (DaTSCAN) 2020 11/04/2020    [id number] 11/3/2020   Narrative & Impression   Examination: Nuclear medicine DATscan for Dopamine Receptor Localization.   Examination: NM Brain Image Tomographic (SPECT) DATscan   Date: 11/3/2020 3:21 PM   Indication: Parkinsonism   Comparison: None   Additional Information: none   Interfering Medications: None   Technique:   The patient initially received 1 ml of Lugol's solution orally prior     Antiphospholipid antibody syndrome (H) 2005    Ravi's     Antiphospholipid antibody syndrome (H)     Ravi's, noted negative per testing re-done in 2008 in hematology note 01/13/2009     Articulation disorder 09/23/2020     Atrial fibrillation (H) 04/2011     Benign prostatic hyperplasia with urinary retention      Cirrhosis (H)      CKD (chronic kidney disease) stage 2, GFR 60-89 ml/min      Diabetes mellitus type II     steroid induced     DJD (degenerative joint disease) of knee     SHAWN, worse on right     DVT (deep venous thrombosis) (H)      ED (erectile dysfunction)      Gallbladder polyp 05/2010     Mwvjh-Bwhwjn-Mrgr Disease 2007    BMT     Hemochromatosis      Hodgkin's disease NOS     5 cycles of ABVD in 2011     HTN (hypertension)      Hyperlipidaemia LDL goal <100      Multiple thyroid nodules     benign      Myeloproliferative disorder (H)     atypical, [facility]     Parkinson  disease (H) 09/24/2020     PE (pulmonary embolism) 11/2004     Polyneuropathy      Primary pulmonary hypertension (H)      Thrombocytopenia (H) 06/08/2021       Past Surgical History   I have reviewed this patient's surgical history and updated it with pertinent information if needed.  Past Surgical History:   Procedure Laterality Date     ANESTHESIA CARDIOVERSION  4/21/2011    Procedure:ANESTHESIA CARDIOVERSION; Surgeon:GENERIC ANESTHESIA PROVIDER; Location:[facility] OR     ARTHROSCOPY KNEE RT/LT      LT     CATARACT IOL, RT/LT  2017     COLONOSCOPY  10/16/2012    Procedure: COLONOSCOPY;  Colonoscopy, screening;  Surgeon: Reg Feliciano MD;  Location: [facility] OR     COLONOSCOPY N/A 4/14/2021    Procedure: COLONOSCOPY;  Surgeon: Reg Holm MD;  Location: [facility] GI     ESOPHAGOSCOPY, GASTROSCOPY, DUODENOSCOPY (EGD), COMBINED N/A 10/7/2020    Procedure: ESOPHAGOGASTRODUODENOSCOPY, WITH BIOPSY;  Surgeon: Raul Sawyer DO;  Location: [facility] OR     H ABLATION FOCAL AFIB  3/5/2013    PVI     H ABLATION FOCAL AFIB  01/01/2018     HC BONE MARROW/STEM TRANSPLANT ALLOGENIC  5/8/2007     INSERT PORT VASCULAR ACCESS       JOINT REPLACEMTN, KNEE RT/LT  3/28/12    SHAWN     VASECTOMY  1990       Social History   I have reviewed this patient's social history and updated it with pertinent information if needed.  Social History     Tobacco Use     Smoking status: Never Smoker     Smokeless tobacco: Never Used   Substance Use Topics     Alcohol use: No     Drug use: No       Family History   I have reviewed this patient's family history and updated it with pertinent information if needed.  Family History   Problem Relation Age of Onset     Hypertension Mother      Cerebrovascular Disease Mother      Breast Cancer Mother      Arrhythmia Brother      Arrhythmia Sister         supraventricular tachycardia     Thyroid Disease Sister      Other - See Comments Son         lives in [place]. no kids and not .     Obsessive  Compulsive Disorder Son      Depression Son      Eating Disorder Son         eats when depressed     Obesity Son      Thyroid Cancer Daughter         had thyroid removed     Bipolar Disorder Daughter      Other - See Comments Daughter         lives in [place] - single with one son 9  yrs     Other - See Comments Sister      Alzheimer Disease Father      Diabetes Paternal Aunt      Gastrointestinal Disease Maternal Grandmother         colitis      Parkinsonism Other         2 cousins with parkinson     C.A.D. No family hx of        Prior to Admission Medications   Prior to Admission Medications   Prescriptions Last Dose Informant Patient Reported? Taking?   HYDROcodone-acetaminophen (NORCO) 5-325 MG tablet   Yes No   Sig: Take 1 tablet by mouth daily   Nutritional Supplements (ENSURE PO)   Yes No   PREVIDENT 5000 DRY MOUTH 1.1 % GEL topical gel   Yes No   Sig: Take by mouth daily   acetaminophen (TYLENOL) 500 MG tablet   Yes No   Simg tab as needed   amoxicillin (AMOXIL) 500 MG capsule   Yes No   Si x 500mg tabs by mouth 1 hour before dental appointments.   bisacodyl (DULCOLAX) 5 MG EC tablet   No No   Sig: Refer to instructions sent via [employer].   calcium carbonate (TUMS) 500 MG chewable tablet   Yes No   Si tab by mouth in morning or at night as needed   carbidopa-levodopa (SINEMET)  MG tablet   No No   Sig: Start with 1/2 tab daily x 3days then 1/2 tab twice daily for 3-7 days then 1 tab twice daily for one week then 1 tablet 3 times per day. The dose can be increased if there is no significant benefit. IT may take 1-3 weeks before benefit is seen  To lessen the effects that protein has on the effectiveness of sinemet (i.e.. Levodopa), it is best to take this medication on an empty stomach one hour before or two hours after meals.     If you are experiencing nausea with the medication, then you should take the medication with a CRACKER, GINGER TABLETS OR WITH FOOD. CALL IF STILL HAVING  NAUSEA AS WE CAN TRY EXTRA CARBIDOPA, TRIMETHOBENZAMIDE ETC.     Besides nausea there are other side effects including the rare occurrence of a rash to the yellow dye in the sinemet tablets if you are taking the 25/100 formulation.     Iron may interfere with the effectiveness of this medication so do not take iron supplements at the same time you are taking sinemet.   cephALEXin (KEFLEX) 750 MG capsule   Yes No   Sig: Take 1 capsule by mouth daily   Patient not taking: Reported on 8/17/2021   docusate sodium (COLACE) 50 MG capsule   No No   Sig: Take 1 capsule (50 mg) by mouth 2 times daily as needed for constipation   dutasteride (AVODART) 0.5 MG capsule   No No   Sig: Take 1 capsule (0.5 mg) by mouth daily   flecainide (TAMBOCOR) 50 MG tablet   No No   Sig: Take 1 tablet (50 mg) by mouth 2 times daily   fluticasone (FLONASE) 50 MCG/ACT nasal spray   No No   Sig: Spray 1 spray into both nostrils daily as needed for rhinitis   loratadine (CLARITIN) 10 MG tablet   Yes No   Sig: Take 10 mg by mouth daily as needed    metoprolol succinate ER (TOPROL XL) 25 MG 24 hr tablet   No No   Sig: Take 1 tablet (25 mg) by mouth daily In the evening   multivitamin (CENTRUM SILVER) tablet   Yes No   Sig: Take 1 tablet by mouth daily   polyethylene glycol (MIRALAX) 17 g packet   Yes No   Sig: Take 1 packet by mouth daily as needed for constipation   senna-docusate (SENOKOT-S/PERICOLACE) 8.6-50 MG tablet   Yes No   Sig: Take 2 tablets by mouth daily   simvastatin (ZOCOR) 10 MG tablet   No No   Sig: Take 1 tablet (10 mg) by mouth daily   vitamin D3 (CHOLECALCIFEROL) 50 mcg (2000 units) tablet   Yes No   Sig: Take 1 tablet (50 mcg) by mouth daily   warfarin ANTICOAGULANT (COUMADIN) 2.5 MG tablet   No No   Sig: TAKE 1.25 MG (1/2 TABLET) MON/FRI AND 2.5 MG (1 TABLET) ALL OTHER DAYS. NEED APPT.   Patient taking differently: Taking 2.5mg daily. Fridays 3.75mg      Facility-Administered Medications: None     Allergies   Allergies    Allergen Reactions     Seasonal Allergies        Physical Exam   Vital Signs: Temp: 98  F (36.7  C) Temp src: Oral BP: 111/69 Pulse: 74   Resp: 18 SpO2: 99 % O2 Device: None (Room air)    Weight: 150 lbs 0 oz    Physical Exam   Constitutional:   Cachectic older man  Head: Normocephalic and atraumatic.  Temporal wasting.  Eyes: Conjunctivae are normal. Pupils are equal, round, and reactive to light.    Oropharynx: Pharynx has no erythema or exudate, mucous membranes are moist  Neck:   No adenopathy, no bony tenderness  Cardiovascular: Regular rate and rhythm without murmurs or gallops  Pulmonary/Chest: Clear to auscultation bilaterally, with no wheezes or retractions. No respiratory distress.  GI: Soft with good bowel sounds.  Non-tender, non-distended, with no guarding, no rebound, no peritoneal signs.   Back:  No bony or CVA tenderness   Musculoskeletal:  No edema or clubbing   Skin: Skin is warm and dry. No rash noted.   Neurological: Alert and oriented to person, place, and time. Nonfocal exam  Psychiatric:  Normal mood and affect.      Data   Data reviewed today: I reviewed all medications, new labs and imaging results over the last 24 hours. I personally reviewed his imaging and labs from today.

## 2021-08-20 ENCOUNTER — TRANSFERRED RECORDS (OUTPATIENT)
Dept: HEALTH INFORMATION MANAGEMENT | Facility: CLINIC | Age: 71
End: 2021-08-20

## 2021-08-20 ENCOUNTER — APPOINTMENT (OUTPATIENT)
Dept: SPEECH THERAPY | Facility: CLINIC | Age: 71
DRG: 871 | End: 2021-08-20
Attending: NURSE PRACTITIONER
Payer: COMMERCIAL

## 2021-08-20 ENCOUNTER — APPOINTMENT (OUTPATIENT)
Dept: CARDIOLOGY | Facility: CLINIC | Age: 71
DRG: 871 | End: 2021-08-20
Attending: NURSE PRACTITIONER
Payer: COMMERCIAL

## 2021-08-20 LAB
ANION GAP SERPL CALCULATED.3IONS-SCNC: 7 MMOL/L (ref 3–14)
BUN SERPL-MCNC: 30 MG/DL (ref 7–30)
CALCIUM SERPL-MCNC: 8 MG/DL (ref 8.5–10.1)
CHLORIDE BLD-SCNC: 106 MMOL/L (ref 94–109)
CO2 SERPL-SCNC: 23 MMOL/L (ref 20–32)
CREAT SERPL-MCNC: 0.99 MG/DL (ref 0.66–1.25)
ERYTHROCYTE [DISTWIDTH] IN BLOOD BY AUTOMATED COUNT: 12.7 % (ref 10–15)
GFR SERPL CREATININE-BSD FRML MDRD: 75 ML/MIN/1.73M2
GLUCOSE BLD-MCNC: 89 MG/DL (ref 70–99)
HBA1C MFR BLD: 5.5 % (ref 0–5.6)
HCT VFR BLD AUTO: 39.9 % (ref 40–53)
HGB BLD-MCNC: 13.7 G/DL (ref 13.3–17.7)
HOLD SPECIMEN: NORMAL
INR PPP: 3.41 (ref 0.85–1.15)
LVEF ECHO: NORMAL
MCH RBC QN AUTO: 31.6 PG (ref 26.5–33)
MCHC RBC AUTO-ENTMCNC: 34.3 G/DL (ref 31.5–36.5)
MCV RBC AUTO: 92 FL (ref 78–100)
PLATELET # BLD AUTO: 109 10E3/UL (ref 150–450)
POTASSIUM BLD-SCNC: 3.7 MMOL/L (ref 3.4–5.3)
RBC # BLD AUTO: 4.34 10E6/UL (ref 4.4–5.9)
SODIUM SERPL-SCNC: 136 MMOL/L (ref 133–144)
TROPONIN I SERPL-MCNC: 1.75 UG/L (ref 0–0.04)
TSH SERPL DL<=0.005 MIU/L-ACNC: 1.49 MU/L (ref 0.4–4)
WBC # BLD AUTO: 8.9 10E3/UL (ref 4–11)

## 2021-08-20 PROCEDURE — 99232 SBSQ HOSP IP/OBS MODERATE 35: CPT | Performed by: STUDENT IN AN ORGANIZED HEALTH CARE EDUCATION/TRAINING PROGRAM

## 2021-08-20 PROCEDURE — 85610 PROTHROMBIN TIME: CPT | Performed by: NURSE PRACTITIONER

## 2021-08-20 PROCEDURE — 80048 BASIC METABOLIC PNL TOTAL CA: CPT | Performed by: STUDENT IN AN ORGANIZED HEALTH CARE EDUCATION/TRAINING PROGRAM

## 2021-08-20 PROCEDURE — 999N000208 ECHOCARDIOGRAM COMPLETE

## 2021-08-20 PROCEDURE — 99207 PR CDG-MDM COMPONENT: MEETS MODERATE - DOWN CODED: CPT | Performed by: STUDENT IN AN ORGANIZED HEALTH CARE EDUCATION/TRAINING PROGRAM

## 2021-08-20 PROCEDURE — 250N000013 HC RX MED GY IP 250 OP 250 PS 637: Performed by: INTERNAL MEDICINE

## 2021-08-20 PROCEDURE — 250N000011 HC RX IP 250 OP 636: Performed by: NURSE PRACTITIONER

## 2021-08-20 PROCEDURE — 36415 COLL VENOUS BLD VENIPUNCTURE: CPT | Performed by: STUDENT IN AN ORGANIZED HEALTH CARE EDUCATION/TRAINING PROGRAM

## 2021-08-20 PROCEDURE — 85014 HEMATOCRIT: CPT | Performed by: STUDENT IN AN ORGANIZED HEALTH CARE EDUCATION/TRAINING PROGRAM

## 2021-08-20 PROCEDURE — 92526 ORAL FUNCTION THERAPY: CPT | Mod: GN

## 2021-08-20 PROCEDURE — 36415 COLL VENOUS BLD VENIPUNCTURE: CPT | Performed by: NURSE PRACTITIONER

## 2021-08-20 PROCEDURE — 255N000002 HC RX 255 OP 636: Performed by: INTERNAL MEDICINE

## 2021-08-20 PROCEDURE — 99222 1ST HOSP IP/OBS MODERATE 55: CPT | Mod: 25 | Performed by: INTERNAL MEDICINE

## 2021-08-20 PROCEDURE — 999N000128 HC STATISTIC PERIPHERAL IV START W/O US GUIDANCE

## 2021-08-20 PROCEDURE — 92610 EVALUATE SWALLOWING FUNCTION: CPT | Mod: GN

## 2021-08-20 PROCEDURE — 250N000013 HC RX MED GY IP 250 OP 250 PS 637: Performed by: NURSE PRACTITIONER

## 2021-08-20 PROCEDURE — 93306 TTE W/DOPPLER COMPLETE: CPT | Mod: 26 | Performed by: INTERNAL MEDICINE

## 2021-08-20 PROCEDURE — C8929 TTE W OR WO FOL WCON,DOPPLER: HCPCS

## 2021-08-20 PROCEDURE — 83036 HEMOGLOBIN GLYCOSYLATED A1C: CPT | Performed by: STUDENT IN AN ORGANIZED HEALTH CARE EDUCATION/TRAINING PROGRAM

## 2021-08-20 PROCEDURE — 120N000002 HC R&B MED SURG/OB UMMC

## 2021-08-20 PROCEDURE — 250N000013 HC RX MED GY IP 250 OP 250 PS 637: Performed by: STUDENT IN AN ORGANIZED HEALTH CARE EDUCATION/TRAINING PROGRAM

## 2021-08-20 PROCEDURE — 84484 ASSAY OF TROPONIN QUANT: CPT | Performed by: NURSE PRACTITIONER

## 2021-08-20 PROCEDURE — 84443 ASSAY THYROID STIM HORMONE: CPT | Performed by: STUDENT IN AN ORGANIZED HEALTH CARE EDUCATION/TRAINING PROGRAM

## 2021-08-20 PROCEDURE — 82465 ASSAY BLD/SERUM CHOLESTEROL: CPT | Performed by: STUDENT IN AN ORGANIZED HEALTH CARE EDUCATION/TRAINING PROGRAM

## 2021-08-20 RX ORDER — WARFARIN SODIUM 2 MG/1
2 TABLET ORAL
Status: COMPLETED | OUTPATIENT
Start: 2021-08-20 | End: 2021-08-20

## 2021-08-20 RX ORDER — ROSUVASTATIN CALCIUM 10 MG/1
40 TABLET, COATED ORAL DAILY
Status: DISCONTINUED | OUTPATIENT
Start: 2021-08-20 | End: 2021-08-21 | Stop reason: HOSPADM

## 2021-08-20 RX ADMIN — SACUBITRIL AND VALSARTAN 1 TABLET: 24; 26 TABLET, FILM COATED ORAL at 20:07

## 2021-08-20 RX ADMIN — CEFTRIAXONE SODIUM 1 G: 1 INJECTION, POWDER, FOR SOLUTION INTRAMUSCULAR; INTRAVENOUS at 21:39

## 2021-08-20 RX ADMIN — DUTASTERIDE 0.5 MG: 0.5 CAPSULE, LIQUID FILLED ORAL at 21:48

## 2021-08-20 RX ADMIN — ROSUVASTATIN CALCIUM 40 MG: 10 TABLET, FILM COATED ORAL at 18:06

## 2021-08-20 RX ADMIN — METOPROLOL SUCCINATE 25 MG: 25 TABLET, EXTENDED RELEASE ORAL at 22:56

## 2021-08-20 RX ADMIN — FLECAINIDE ACETATE 50 MG: 50 TABLET ORAL at 20:07

## 2021-08-20 RX ADMIN — FLECAINIDE ACETATE 50 MG: 50 TABLET ORAL at 07:29

## 2021-08-20 RX ADMIN — EMPAGLIFLOZIN 10 MG: 10 TABLET, FILM COATED ORAL at 17:42

## 2021-08-20 RX ADMIN — ACETAMINOPHEN 975 MG: 325 TABLET, FILM COATED ORAL at 20:06

## 2021-08-20 RX ADMIN — DOCUSATE SODIUM 50 MG AND SENNOSIDES 8.6 MG 2 TABLET: 8.6; 5 TABLET, FILM COATED ORAL at 07:29

## 2021-08-20 RX ADMIN — ACETAMINOPHEN 975 MG: 325 TABLET, FILM COATED ORAL at 12:27

## 2021-08-20 RX ADMIN — WARFARIN SODIUM 2 MG: 2 TABLET ORAL at 20:06

## 2021-08-20 RX ADMIN — HUMAN ALBUMIN MICROSPHERES AND PERFLUTREN 5 ML: 10; .22 INJECTION, SOLUTION INTRAVENOUS at 09:47

## 2021-08-20 RX ADMIN — AZITHROMYCIN MONOHYDRATE 250 MG: 250 TABLET ORAL at 07:29

## 2021-08-20 ASSESSMENT — ACTIVITIES OF DAILY LIVING (ADL)
ADLS_ACUITY_SCORE: 16
EATING/SWALLOWING: EATING;SWALLOWING LIQUIDS;SWALLOWING SOLID FOOD
DIFFICULTY_COMMUNICATING: NO
WALKING_OR_CLIMBING_STAIRS_DIFFICULTY: NO
WEAR_GLASSES_OR_BLIND: YES
DOING_ERRANDS_INDEPENDENTLY_DIFFICULTY: YES
ADLS_ACUITY_SCORE: 17
DRESSING/BATHING_DIFFICULTY: YES
DIFFICULTY_EATING/SWALLOWING: YES
CONCENTRATING,_REMEMBERING_OR_MAKING_DECISIONS_DIFFICULTY: NO
FALL_HISTORY_WITHIN_LAST_SIX_MONTHS: YES
NUMBER_OF_TIMES_PATIENT_HAS_FALLEN_WITHIN_LAST_SIX_MONTHS: 1
TOILETING_ISSUES: YES
TOILETING_MANAGEMENT: URGENCY
DRESSING/BATHING: DRESSING DIFFICULTY, ASSISTANCE 1 PERSON

## 2021-08-20 NOTE — CONSULTS
[facility]    Cardiology Consult Note-Cards 1      Date of Admission:  8/19/2021      Assessment & Plan: [facility]   Haresh Rock is a 71 year old male admitted on 8/19/2021. He has past medical history significant for DMII (possibly resolved), CKD Stage II, cirrhosis, PE, pulmonary hypertension, hyperlipidemia, HTN, Hodgkin's Disease, DVT, Atrial Fibrillation, Parkinson Disease (with dysphasia), myeloproliferative disorder, graft vs host disease who presented for treatment of aspiration pneumonia and was found to have a positive troponin in the setting of his infection without EKG changes. He does have a newly decreased EF and inferior wall akinesis on his TTE.      Recommendations  -Please send lipid panel/HBA1C/and TSH  -Please switch home simvastatin to rosuvastatin 40mg daily  -Please resume home metoprolol 25mg XL  -Please initiate emplaglaflozin 10mg daily   -Please initate entresto 24/26  -Please trend troponin to peak  Serial EKGs for CP  Please maintain telemetry and continuous pulse ox  Morphine and Nitrates for acute CP  Please continue ASA daily   -Patient would benefit from outpatient stress cardiac MRI to evaluate for stress cardiomyopathy vs ischemia    Thank you for allowing us to participate in the care of this patient, and please do not hesitate to reach out to us with concerns or questions by calling the [phone] pager.       The patient's care was discussed with the Attending Physician, Dr. Farrar.    Eugene Mejia MD  [facility]  Pager: [phone]      ______________________________________________________________________    Chief Complaint   Pneumonia    History is obtained from the patient    History of Present Illness   Haresh Rock is a 71 year old male who presents with his daughter, Gemma and a past medical history significant for DMII (possibly resolved), CKD Stage II, cirrhosis, PE, pulmonary  hypertension, hyperlipidemia, HTN, Hodgkin's Disease, DVT, Atrial Fibrillation, Parkinson Disease (with dysphasia), myeloproliferative disorder, graft vs host disease who presented for treatment of aspiration pneumonia who was found to have an elevated troponin. He denies CP, he has no EKG changes, though his TTE shows new WMA in the inferior wall and decreased EF.    Review of Systems   The 10 point Review of Systems is negative other than noted in the HPI or here.     Past Medical History    I have reviewed this patient's medical history and updated it with pertinent information if needed.   Past Medical History:   Diagnosis Date     Abnormal dopamine scan (DaTSCAN) 2020 11/04/2020    [phone] 11/3/2020   Narrative & Impression   Examination: Nuclear medicine DATscan for Dopamine Receptor Localization.   Examination: NM Brain Image Tomographic (SPECT) DATscan   Date: 11/3/2020 3:21 PM   Indication: Parkinsonism   Comparison: None   Additional Information: none   Interfering Medications: None   Technique:   The patient initially received 1 ml of Lugol's solution orally prior     Antiphospholipid antibody syndrome (H) 2005    Ravi's     Antiphospholipid antibody syndrome (H)     Raiv's, noted negative per testing re-done in 2008 in hematology note 01/13/2009     Articulation disorder 09/23/2020     Atrial fibrillation (H) 04/2011     Benign prostatic hyperplasia with urinary retention      Cirrhosis (H)      CKD (chronic kidney disease) stage 2, GFR 60-89 ml/min      Diabetes mellitus type II     steroid induced     DJD (degenerative joint disease) of knee     SHAWN, worse on right     DVT (deep venous thrombosis) (H)      ED (erectile dysfunction)      Gallbladder polyp 05/2010     Bghqv-Eqwymz-Mylh Disease 2007    BMT     Hemochromatosis      Hodgkin's disease NOS     5 cycles of ABVD in 2011     HTN (hypertension)      Hyperlipidaemia LDL goal <100      Multiple thyroid nodules     benign       Myeloproliferative disorder (H)     atypical, [facility]     Parkinson disease (H) 09/24/2020     PE (pulmonary embolism) 11/2004     Polyneuropathy      Primary pulmonary hypertension (H)      Thrombocytopenia (H) 06/08/2021       Past Surgical History   I have reviewed this patient's surgical history and updated it with pertinent information if needed.  Past Surgical History:   Procedure Laterality Date     ANESTHESIA CARDIOVERSION  4/21/2011    Procedure:ANESTHESIA CARDIOVERSION; Surgeon:GENERIC ANESTHESIA PROVIDER; Location:[facility] OR     ARTHROSCOPY KNEE RT/LT      LT     CATARACT IOL, RT/LT  2017     COLONOSCOPY  10/16/2012    Procedure: COLONOSCOPY;  Colonoscopy, screening;  Surgeon: Reg Feliciano MD;  Location: [facility] OR     COLONOSCOPY N/A 4/14/2021    Procedure: COLONOSCOPY;  Surgeon: Reg Holm MD;  Location: [facility] GI     ESOPHAGOSCOPY, GASTROSCOPY, DUODENOSCOPY (EGD), COMBINED N/A 10/7/2020    Procedure: ESOPHAGOGASTRODUODENOSCOPY, WITH BIOPSY;  Surgeon: Raul Sawyer DO;  Location: [facility] OR     H ABLATION FOCAL AFIB  3/5/2013    PVI     H ABLATION FOCAL AFIB  01/01/2018     HC BONE MARROW/STEM TRANSPLANT ALLOGENIC  5/8/2007     INSERT PORT VASCULAR ACCESS       JOINT REPLACEMTN, KNEE RT/LT  3/28/12    SHAWN     VASECTOMY  1990       Social History   I have reviewed this patient's social history and updated it with pertinent information if needed.  Social History     Tobacco Use     Smoking status: Never Smoker     Smokeless tobacco: Never Used   Substance Use Topics     Alcohol use: No     Drug use: No     Family History   I have reviewed this patient's family history and updated it with pertinent information if needed.   I have reviewed this patient's family history and updated it with pertinent information if needed.  Family History   Problem Relation Age of Onset     Hypertension Mother      Cerebrovascular Disease Mother      Breast Cancer Mother      Arrhythmia Brother      Arrhythmia Sister          supraventricular tachycardia     Thyroid Disease Sister      Other - See Comments Son         lives in [place]. no kids and not .     Obsessive Compulsive Disorder Son      Depression Son      Eating Disorder Son         eats when depressed     Obesity Son      Thyroid Cancer Daughter         had thyroid removed     Bipolar Disorder Daughter      Other - See Comments Daughter         lives in [place] - single with one son 9  yrs     Other - See Comments Sister      Alzheimer Disease Father      Diabetes Paternal Aunt      Gastrointestinal Disease Maternal Grandmother         colitis      Parkinsonism Other         2 cousins with parkinson     C.A.D. No family hx of        Medications   I have reviewed this patient's current medications    Allergies   Allergies   Allergen Reactions     Seasonal Allergies        Physical Exam   Vital Signs: Temp: 98  F (36.7  C) Temp src: Oral BP: 108/67 Pulse: 85   Resp: 10 SpO2: 96 % O2 Device: None (Room air)    Weight: 150 lbs 0 oz    NAD  A&Ox3  RRR  No respiratory distress  WWP no edema    Data   Results for orders placed or performed during the hospital encounter of 08/19/21 (from the past 24 hour(s))   Troponin I   Result Value Ref Range    Troponin I 0.586 (HH) 0.000 - 0.045 ug/L   [facility] Draw    Narrative    The following orders were created for panel order [place] Draw.  Procedure                               Abnormality         Status                     ---------                               -----------         ------                     Extra Blue Top Tube[[id number]]                              Final result               Extra Purple Top Tube[[id number]]                            Final result                 Please view results for these tests on the individual orders.   Extra Blue Top Tube   Result Value Ref Range    Hold Specimen JIC    Extra Purple Top Tube   Result Value Ref Range    Hold Specimen JIC    INR   Result Value Ref Range    INR 2.98  (H) 0.85 - 1.15   CBC with platelets   Result Value Ref Range    WBC Count 16.4 (H) 4.0 - 11.0 10e3/uL    RBC Count 4.48 4.40 - 5.90 10e6/uL    Hemoglobin 14.2 13.3 - 17.7 g/dL    Hematocrit 41.3 40.0 - 53.0 %    MCV 92 78 - 100 fL    MCH 31.7 26.5 - 33.0 pg    MCHC 34.4 31.5 - 36.5 g/dL    RDW 12.7 10.0 - 15.0 %    Platelet Count 122 (L) 150 - 450 10e3/uL   EKG 12 lead   Result Value Ref Range    Systolic Blood Pressure  mmHg    Diastolic Blood Pressure  mmHg    Ventricular Rate 64 BPM    Atrial Rate 64 BPM    NH Interval 188 ms    QRS Duration 80 ms     ms    QTc 435 ms    P Axis 60 degrees    R AXIS -12 degrees    T Axis 29 degrees    Interpretation ECG Sinus rhythm  Normal ECG      EKG 12 lead   Result Value Ref Range    Systolic Blood Pressure  mmHg    Diastolic Blood Pressure  mmHg    Ventricular Rate 64 BPM    Atrial Rate 64 BPM    NH Interval 180 ms    QRS Duration 84 ms     ms    QTc 441 ms    P Axis 52 degrees    R AXIS 27 degrees    T Axis 46 degrees    Interpretation ECG Sinus rhythm  Normal ECG      Chest XR,  PA & LAT    Narrative    EXAM: XR CHEST 2 VW  8/19/2021 5:46 PM     HISTORY:  Cough, fever       COMPARISON:  Chest x-ray 6/8/2021    FINDINGS:   PA and lateral views of the chest. The trachea is midline. Chronic  silhouette is within normal limits. Asymmetric elevation of the right  hemidiaphragm with blunting of the right costophrenic angle. Patchy  airspace opacities in the bilateral lower lung zones, right greater  than left. No pleural effusion or pneumothorax. Osseous structures are  within normal limits.      Impression    IMPRESSION:   Airspace opacities in the bilateral lower lung zones, suspicious for  infection.    I have personally reviewed the examination and initial interpretation  and I agree with the findings.    NAOMI WILLIAM MD         SYSTEM ID:  [id number]   Troponin I   Result Value Ref Range    Troponin I 0.660 (HH) 0.000 - 0.045 ug/L   Troponin I   Result Value  Ref Range    Troponin I 2.375 (HH) 0.000 - 0.045 ug/L   Comprehensive metabolic panel   Result Value Ref Range    Sodium 138 133 - 144 mmol/L    Potassium 3.7 3.4 - 5.3 mmol/L    Chloride 104 94 - 109 mmol/L    Carbon Dioxide (CO2) 24 20 - 32 mmol/L    Anion Gap 10 3 - 14 mmol/L    Urea Nitrogen 32 (H) 7 - 30 mg/dL    Creatinine 1.15 0.66 - 1.25 mg/dL    Calcium 8.2 (L) 8.5 - 10.1 mg/dL    Glucose 139 (H) 70 - 99 mg/dL    Alkaline Phosphatase 153 (H) 40 - 150 U/L    AST 59 (H) 0 - 45 U/L    ALT 27 0 - 70 U/L    Protein Total 7.2 6.8 - 8.8 g/dL    Albumin 2.7 (L) 3.4 - 5.0 g/dL    Bilirubin Total 1.0 0.2 - 1.3 mg/dL    GFR Estimate 63 >60 mL/min/1.73m2   Cardiology General Adult IP Consult: Patient to be seen: ASAP within 4 hrs; Call back #: [phone]; Troponin elevation; Consultant may enter orders: Yes; Requesting provider? Hospitalist (if different from attending physician)    Faraz Huff MD     8/20/2021 12:53 AM      [facility]    Cardiology Consult Note-Cards 1      Date of Admission:  8/19/2021  Consult Requested by: Hospital Medicine  Reason for Consult: Elevated Troponin    Assessment & Plan: [facility]   Haresh Rock is a 71 year old male admitted on 8/19/2021. He   has atrial fibrillation (on metoprolol flecainide and Coumadin),   history of steroid-induced type 2 diabetes (resolved),   dyslipidemia,  myeloproliferative disorder status post bone   marrow transplant, and recently diagnosed Parkinson disease   primarily causing dysphagia presenting with presumed aspiration   pneumonia in the setting of fevers chills cough and shortness of   breath.  Found to have elevated troponin as part of infectious   work-up.  Patient has no signs or symptoms of myocardial ischemia   (no chest discomfort/angina; no ECG abnormalities), which makes   non-ST elevation acute coronary syndrome unlikely.  Presentation   most consistent with myocardial injury in the  setting of sepsis   due to pneumonia (demand ischemia).  He had several prior stress   tests, but does not feel confident that he can exercise anymore   due to his Parkinsons.  Additionally, stress-testing would be   suboptimal in patient with acute infectious illness and sepsis.   However, he does warrant an ischemic evaluation and may benefit   from ischemic evaluation (stress testing) upon resolution of   acute illness. Total cholesterol 110 mg/dL, TG 96 mg/dL, LDL-C 55   mg/dL on simvastatin 10 mg.     1.  Myocardial injury (demand ischemia) due to  2.  Sepsis due to  3.  Aspiration pneumonia due to  4.  Parkinson's disease associated dysphagia  5.  Atrial fibrillation    Recommendations:  -No indication for urgent or emergent cardiac   catheterization/revascularization  -No indication for ACS protocol heparin at this time  -Trend troponin to peak  -Agree with 2D transthoracic echocardiogram with color-flow   Doppler in the morning  -Continue metoprolol, flecainide, and warfarin (goal INR 2-3)  -Ischemic evaluation upon resolution of illness  -Please contact our team via the [phone] pager with additional   questions  -continue simvastatin    Risk factors for coronary artery disease include: age,   dyslipidemia    Thank you for allowing us to participate in the care of this   patient.  Cardiology will continue to follow in the morning.     The patient's care was discussed with the Patient and Primary   team.    Faraz [place] MD, MSc  Cardiovascular Disease Fellow  [facility]  [phone]      __________________________________________________________________  ____    Chief Complaint   Fevers/rigors    History is obtained from the patient and electronic health record    History of Present Illness   Haresh Rock is a 71 year old male who has a history of atrial   fibrillation on flecainide, metoprolol, and Coumadin,   steroid-incuded type 2 diabetes (resolved, per patient) , history   of  Parkinson's disease related dysphagia, myeloproliferative   disorder status post bone marrow transplant in 2007, presenting   for evaluation of fevers/chills and cough with shortness of   breath found to have aspiration pneumonia.  A troponin was   ordered as part of his work-up at a local emergency room which   was mildly elevated at 0.2.  Upon presentation to [facility], troponin   salvador to 0.59 and 0.66, then 2.6.  Cardiology consulted for   evaluation.    Patient has known history of coronary artery disease and has had   multiple negative exercise stress test in recent years.  He   reports no history of exertional or nonexertional chest pain that   would raise suspicion for angina.  At baseline, is able to walk   up 2 flights of stairs without any issue and able to use a push   lawnmower for 30 minutes uninterrupted.  He previously had   steroid-induced diabetes but states this improved after he no   longer required steroids for immune suppression.  He is a never   smoker and has no family history of coronary artery disease.    Upon further review of history, he notes that Sunday evening he   began having fevers and rigors in the setting of cough and   weakness.  He has dysphagia related to Parkinson's disease and   has chronic cough as a result, exacerbated if he does not eat his   food slowly.  For the past 5 days, he feels he has been having   fevers cough and shortness of breath.  Checked his temperature at   home was 101 Fahrenheit, improved with Tylenol.  He notes one   episode of sharp stabbing chest pain when he rolled out of bed   yesterday.  Pain was located very laterally and newly under his   axilla, and improved when he got up and moved his arms.  He notes   this is not happened before and has not happened since.  Denies   any palpitations throughout this time.  No orthopnea lower   extremity edema or PND.    Review of Systems   The 10 point Review of Systems is negative other than noted in   the HPI or  here.     Past Medical History    I have reviewed this patient's medical history and updated it   with pertinent information if needed.   Past Medical History:   Diagnosis Date     Abnormal dopamine scan (DaTSCAN) 2020 11/04/2020    [phone] 11/3/2020   Narrative & Impression   Examination:   Nuclear medicine DATscan for Dopamine Receptor Localization.     Examination: NM Brain Image Tomographic (SPECT) DATscan   Date:   11/3/2020 3:21 PM   Indication: Parkinsonism   Comparison: None     Additional Information: none   Interfering Medications: None     Technique:   The patient initially received 1 ml of Lugol's   solution orally prior     Antiphospholipid antibody syndrome (H) 2005    Ravi's     Antiphospholipid antibody syndrome (H)     Ravi's, noted negative per testing re-done in 2008 in hematology   note 01/13/2009     Articulation disorder 09/23/2020     Atrial fibrillation (H) 04/2011     Benign prostatic hyperplasia with urinary retention      Cirrhosis (H)      CKD (chronic kidney disease) stage 2, GFR 60-89 ml/min      Diabetes mellitus type II     steroid induced     DJD (degenerative joint disease) of knee     SHAWN, worse on right     DVT (deep venous thrombosis) (H)      ED (erectile dysfunction)      Gallbladder polyp 05/2010     Ftljy-Cunyho-Suol Disease 2007    BMT     Hemochromatosis      Hodgkin's disease NOS     5 cycles of ABVD in 2011     HTN (hypertension)      Hyperlipidaemia LDL goal <100      Multiple thyroid nodules     benign      Myeloproliferative disorder (H)     atypical, [facility]     Parkinson disease (H) 09/24/2020     PE (pulmonary embolism) 11/2004     Polyneuropathy      Primary pulmonary hypertension (H)      Thrombocytopenia (H) 06/08/2021       Past Surgical History   I have reviewed this patient's surgical history and updated it   with pertinent information if needed.  Past Surgical History:   Procedure Laterality Date     ANESTHESIA CARDIOVERSION  4/21/2011     Procedure:ANESTHESIA CARDIOVERSION; Surgeon:GENERIC ANESTHESIA   PROVIDER; Location:[facility] OR     ARTHROSCOPY KNEE RT/LT      LT     CATARACT IOL, RT/LT  2017     COLONOSCOPY  10/16/2012    Procedure: COLONOSCOPY;  Colonoscopy, screening;  Surgeon:   Reg Feliciano MD;  Location: [facility] OR     COLONOSCOPY N/A 4/14/2021    Procedure: COLONOSCOPY;  Surgeon: Reg Holm MD;    Location: [facility] GI     ESOPHAGOSCOPY, GASTROSCOPY, DUODENOSCOPY (EGD), COMBINED N/A   10/7/2020    Procedure: ESOPHAGOGASTRODUODENOSCOPY, WITH BIOPSY;  Surgeon:   Raul Sawyer DO;  Location: [facility] OR     H ABLATION FOCAL AFIB  3/5/2013    PVI     H ABLATION FOCAL AFIB  01/01/2018     HC BONE MARROW/STEM TRANSPLANT ALLOGENIC  5/8/2007     INSERT PORT VASCULAR ACCESS       JOINT REPLACEMTN, KNEE RT/LT  3/28/12    SHAWN     VASECTOMY  1990       Social History   I have reviewed this patient's social history and updated it with   pertinent information if needed.  Social History     Tobacco Use     Smoking status: Never Smoker     Smokeless tobacco: Never Used   Substance Use Topics     Alcohol use: No     Drug use: No     Family History   I have reviewed this patient's family history and updated it with   pertinent information if needed.   I have reviewed this patient's family history and updated it with   pertinent information if needed.  Family History   Problem Relation Age of Onset     Hypertension Mother      Cerebrovascular Disease Mother      Breast Cancer Mother      Arrhythmia Brother      Arrhythmia Sister         supraventricular tachycardia     Thyroid Disease Sister      Other - See Comments Son         lives in [place]. no kids and not .     Obsessive Compulsive Disorder Son      Depression Son      Eating Disorder Son         eats when depressed     Obesity Son      Thyroid Cancer Daughter         had thyroid removed     Bipolar Disorder Daughter      Other - See Comments Daughter         lives in [place] - single  with one son 9  yrs     Other - See Comments Sister      Alzheimer Disease Father      Diabetes Paternal Aunt      Gastrointestinal Disease Maternal Grandmother         colitis      Parkinsonism Other         2 cousins with parkinson     C.A.D. No family hx of        Medications   Current Facility-Administered Medications   Medication     acetaminophen (TYLENOL) tablet 975 mg     azithromycin (ZITHROMAX) tablet 250 mg     bisacodyl (DULCOLAX) Suppository 10 mg     cefTRIAXone (ROCEPHIN) 1 g vial to attach to  mL bag for   ADULTS or NS 50 mL bag for PEDS     dutasteride (AVODART) capsule 0.5 mg     flecainide (TAMBOCOR) tablet 50 mg     lidocaine (LMX4) cream     lidocaine 1 % 0.1-1 mL     melatonin tablet 1 mg     metoprolol succinate ER (TOPROL-XL) 24 hr tablet 25 mg     ondansetron (ZOFRAN-ODT) ODT tab 4 mg    Or     ondansetron (ZOFRAN) injection 4 mg     Patient is already receiving anticoagulation with heparin,   enoxaparin (LOVENOX), warfarin (COUMADIN)  or other anticoagulant   medication     polyethylene glycol (MIRALAX) Packet 17 g     prochlorperazine (COMPAZINE) injection 5 mg    Or     prochlorperazine (COMPAZINE) tablet 5 mg    Or     prochlorperazine (COMPAZINE) suppository 12.5 mg     senna-docusate (SENOKOT-S/PERICOLACE) 8.6-50 MG per tablet 2   tablet     simvastatin (ZOCOR) tablet 10 mg     sodium chloride (PF) 0.9% PF flush 3 mL     sodium chloride (PF) 0.9% PF flush 3 mL     Warfarin Therapy Reminder (Check START DATE - warfarin may be   starting in the FUTURE)     warfarin-No DOSE today     Current Outpatient Medications   Medication Sig     acetaminophen (TYLENOL) 500 MG tablet 500mg tab as needed     amoxicillin (AMOXIL) 500 MG capsule 4 x 500mg tabs by mouth 1   hour before dental appointments.     bisacodyl (DULCOLAX) 5 MG EC tablet Refer to instructions sent   via [employer]â„¢.     calcium carbonate (TUMS) 500 MG chewable tablet 1 tab by mouth   in morning or at night as needed      carbidopa-levodopa (SINEMET)  MG tablet Start with 1/2   tab daily x 3days then 1/2 tab twice daily for 3-7 days then 1   tab twice daily for one week then 1 tablet 3 times per day. The   dose can be increased if there is no significant benefit. IT may   take 1-3 weeks before benefit is seen  To lessen the effects that protein has on the effectiveness of   sinemet (i.e.. Levodopa), it is best to take this medication on   an empty stomach one hour before or two hours after meals.     If you are experiencing nausea with the medication, then you   should take the medication with a CRACKER, GINGER TABLETS OR WITH   FOOD. CALL IF STILL HAVING NAUSEA AS WE CAN TRY EXTRA CARBIDOPA,   TRIMETHOBENZAMIDE ETC.     Besides nausea there are other side effects including the rare   occurrence of a rash to the yellow dye in the sinemet tablets if   you are taking the 25/100 formulation.     Iron may interfere with the effectiveness of this medication so   do not take iron supplements at the same time you are taking   sinemet.     cephALEXin (KEFLEX) 750 MG capsule Take 1 capsule by mouth   daily (Patient not taking: Reported on 8/17/2021)     docusate sodium (COLACE) 50 MG capsule Take 1 capsule (50 mg)   by mouth 2 times daily as needed for constipation     dutasteride (AVODART) 0.5 MG capsule Take 1 capsule (0.5 mg) by   mouth daily     flecainide (TAMBOCOR) 50 MG tablet Take 1 tablet (50 mg) by   mouth 2 times daily     fluticasone (FLONASE) 50 MCG/ACT nasal spray Spray 1 spray into   both nostrils daily as needed for rhinitis     loratadine (CLARITIN) 10 MG tablet Take 10 mg by mouth daily as   needed      metoprolol succinate ER (TOPROL XL) 25 MG 24 hr tablet Take 1   tablet (25 mg) by mouth daily In the evening     multivitamin (CENTRUM SILVER) tablet Take 1 tablet by mouth   daily     Nutritional Supplements (ENSURE PO)      polyethylene glycol (MIRALAX) 17 g packet Take 1 packet by   mouth daily as needed for  constipation     PREVIDENT 5000 DRY MOUTH 1.1 % GEL topical gel Take by mouth   daily     senna-docusate (SENOKOT-S/PERICOLACE) 8.6-50 MG tablet Take 2   tablets by mouth daily     simvastatin (ZOCOR) 10 MG tablet Take 1 tablet (10 mg) by mouth   daily     vitamin D3 (CHOLECALCIFEROL) 50 mcg (2000 units) tablet Take 1   tablet (50 mcg) by mouth daily     warfarin ANTICOAGULANT (COUMADIN) 2.5 MG tablet TAKE 1.25 MG   (1/2 TABLET) MON/FRI AND 2.5 MG (1 TABLET) ALL OTHER DAYS. NEED   APPT. (Patient taking differently: Taking 2.5mg daily. Fridays   3.75mg)       Allergies   Allergies   Allergen Reactions     Seasonal Allergies        Physical Exam   Vital Signs: Temp: 98  F (36.7  C) Temp src: Oral BP: 111/69   Pulse: 74   Resp: 18 SpO2: 97 % O2 Device: None (Room air)    Weight: 150 lbs 0 oz    General Appearance: brightly alert, oriented x3,  NAD  Eyes: PERRLA EOMI  HEENT: atraumatic, neck supple,   Respiratory: CTAB  Cardiovascular: rrr, no m/r/g; PMI at 5th ICS x MCL; 2+ carotid,   radial,  Brachial,  Femoral, popliteal, DP, PT pulses b/l  GI: soft nt/nd  Lymph/Hematologic: no ecchymosis  Genitourinary: adult diaper in place  Skin: warm, well perfused  Musculoskeletal: 5/5 strength in upper/lower ext b/l  Neurologic: CN II-XII grossly intact  Psychiatric: normal affect, responds appropriately to questions        Data   I personally reviewed the EKG tracing showing sinus rhythm, rate   64 bpm, no ST/TW abnormalites.  Results for orders placed or performed during the hospital   encounter of 08/19/21 (from the past 24 hour(s))   Troponin I   Result Value Ref Range    Troponin I 0.586 (HH) 0.000 - 0.045 ug/L   [facility] Draw    Narrative    The following orders were created for panel order [facility] Draw.  Procedure                               Abnormality           Status                     ---------                               -----------           ------                     Extra Blue Top Tube[[id number]]                               Final   result               Extra Purple Top Tube[[id number]]                            Final   result                 Please view results for these tests on the individual orders.   Extra Blue Top Tube   Result Value Ref Range    Hold Specimen JIC    Extra Purple Top Tube   Result Value Ref Range    Hold Specimen JIC    INR   Result Value Ref Range    INR 2.98 (H) 0.85 - 1.15   CBC with platelets   Result Value Ref Range    WBC Count 16.4 (H) 4.0 - 11.0 10e3/uL    RBC Count 4.48 4.40 - 5.90 10e6/uL    Hemoglobin 14.2 13.3 - 17.7 g/dL    Hematocrit 41.3 40.0 - 53.0 %    MCV 92 78 - 100 fL    MCH 31.7 26.5 - 33.0 pg    MCHC 34.4 31.5 - 36.5 g/dL    RDW 12.7 10.0 - 15.0 %    Platelet Count 122 (L) 150 - 450 10e3/uL   EKG 12 lead   Result Value Ref Range    Systolic Blood Pressure  mmHg    Diastolic Blood Pressure  mmHg    Ventricular Rate 64 BPM    Atrial Rate 64 BPM    ND Interval 188 ms    QRS Duration 80 ms     ms    QTc 435 ms    P Axis 60 degrees    R AXIS -12 degrees    T Axis 29 degrees    Interpretation ECG Sinus rhythm  Normal ECG      EKG 12 lead   Result Value Ref Range    Systolic Blood Pressure  mmHg    Diastolic Blood Pressure  mmHg    Ventricular Rate 64 BPM    Atrial Rate 64 BPM    ND Interval 180 ms    QRS Duration 84 ms     ms    QTc 441 ms    P Axis 52 degrees    R AXIS 27 degrees    T Axis 46 degrees    Interpretation ECG Sinus rhythm  Normal ECG      Chest XR,  PA & LAT    Narrative    EXAM: XR CHEST 2 VW  8/19/2021 5:46 PM     HISTORY:  Cough, fever       COMPARISON:  Chest x-ray 6/8/2021    FINDINGS:   PA and lateral views of the chest. The trachea is midline.   Chronic  silhouette is within normal limits. Asymmetric elevation of the   right  hemidiaphragm with blunting of the right costophrenic angle.   Patchy  airspace opacities in the bilateral lower lung zones, right   greater  than left. No pleural effusion or pneumothorax. Osseous   structures are  within  normal limits.      Impression    IMPRESSION:   Airspace opacities in the bilateral lower lung zones, suspicious   for  infection.    I have personally reviewed the examination and initial   interpretation  and I agree with the findings.    NAOMI WILLIAM MD         SYSTEM ID:  [id number]   Troponin I   Result Value Ref Range    Troponin I 0.660 (HH) 0.000 - 0.045 ug/L   Troponin I   Result Value Ref Range    Troponin I 2.375 (HH) 0.000 - 0.045 ug/L   Comprehensive metabolic panel   Result Value Ref Range    Sodium 138 133 - 144 mmol/L    Potassium 3.7 3.4 - 5.3 mmol/L    Chloride 104 94 - 109 mmol/L    Carbon Dioxide (CO2) 24 20 - 32 mmol/L    Anion Gap 10 3 - 14 mmol/L    Urea Nitrogen 32 (H) 7 - 30 mg/dL    Creatinine 1.15 0.66 - 1.25 mg/dL    Calcium 8.2 (L) 8.5 - 10.1 mg/dL    Glucose 139 (H) 70 - 99 mg/dL    Alkaline Phosphatase 153 (H) 40 - 150 U/L    AST 59 (H) 0 - 45 U/L    ALT 27 0 - 70 U/L    Protein Total 7.2 6.8 - 8.8 g/dL    Albumin 2.7 (L) 3.4 - 5.0 g/dL    Bilirubin Total 1.0 0.2 - 1.3 mg/dL    GFR Estimate 63 >60 mL/min/1.73m2   EKG 12-lead, complete   Result Value Ref Range    Systolic Blood Pressure  mmHg    Diastolic Blood Pressure  mmHg    Ventricular Rate 66 BPM    Atrial Rate 66 BPM    ME Interval 178 ms    QRS Duration 80 ms     ms    QTc 478 ms    P Axis 94 degrees    R AXIS -13 degrees    T Axis 39 degrees    Interpretation ECG       Sinus rhythm  T wave abnormality, consider anterior ischemia  Abnormal ECG     INR   Result Value Ref Range    INR 3.34 (H) 0.85 - 1.15   Basic metabolic panel   Result Value Ref Range    Sodium 136 133 - 144 mmol/L    Potassium 3.5 3.4 - 5.3 mmol/L    Chloride 103 94 - 109 mmol/L    Carbon Dioxide (CO2) 28 20 - 32 mmol/L    Anion Gap 5 3 - 14 mmol/L    Urea Nitrogen 32 (H) 7 - 30 mg/dL    Creatinine 1.09 0.66 - 1.25 mg/dL    Calcium 8.1 (L) 8.5 - 10.1 mg/dL    Glucose 188 (H) 70 - 99 mg/dL    GFR Estimate 67 >60 mL/min/1.73m2   CBC with platelets   Result  Value Ref Range    WBC Count 10.0 4.0 - 11.0 10e3/uL    RBC Count 4.13 (L) 4.40 - 5.90 10e6/uL    Hemoglobin 13.3 13.3 - 17.7 g/dL    Hematocrit 37.2 (L) 40.0 - 53.0 %    MCV 90 78 - 100 fL    MCH 32.2 26.5 - 33.0 pg    MCHC 35.8 31.5 - 36.5 g/dL    RDW 12.9 10.0 - 15.0 %    Platelet Count 113 (L) 150 - 450 10e3/uL     *Note: Due to a large number of results and/or encounters for the   requested time period, some results have not been displayed. A   complete set of results can be found in Results Review.     Recent Labs   Lab Test 01/15/21  0817 08/20/20  1033 11/16/15  1155 07/09/14  1511   CHOL 110 121 126 168   HDL 36* 31* 31* 29*   LDL 55 64 71 104   TRIG 96 131 119 174*   CHOLHDLRATIO  --   --  4.1 5.8*        EKG 12-lead, complete   Result Value Ref Range    Systolic Blood Pressure  mmHg    Diastolic Blood Pressure  mmHg    Ventricular Rate 66 BPM    Atrial Rate 66 BPM    NV Interval 178 ms    QRS Duration 80 ms     ms    QTc 478 ms    P Axis 94 degrees    R AXIS -13 degrees    T Axis 39 degrees    Interpretation ECG       Sinus rhythm  T wave abnormality, consider anterior ischemia  Abnormal ECG     INR   Result Value Ref Range    INR 3.34 (H) 0.85 - 1.15   Basic metabolic panel   Result Value Ref Range    Sodium 136 133 - 144 mmol/L    Potassium 3.5 3.4 - 5.3 mmol/L    Chloride 103 94 - 109 mmol/L    Carbon Dioxide (CO2) 28 20 - 32 mmol/L    Anion Gap 5 3 - 14 mmol/L    Urea Nitrogen 32 (H) 7 - 30 mg/dL    Creatinine 1.09 0.66 - 1.25 mg/dL    Calcium 8.1 (L) 8.5 - 10.1 mg/dL    Glucose 188 (H) 70 - 99 mg/dL    GFR Estimate 67 >60 mL/min/1.73m2   CBC with platelets   Result Value Ref Range    WBC Count 10.0 4.0 - 11.0 10e3/uL    RBC Count 4.13 (L) 4.40 - 5.90 10e6/uL    Hemoglobin 13.3 13.3 - 17.7 g/dL    Hematocrit 37.2 (L) 40.0 - 53.0 %    MCV 90 78 - 100 fL    MCH 32.2 26.5 - 33.0 pg    MCHC 35.8 31.5 - 36.5 g/dL    RDW 12.9 10.0 - 15.0 %    Platelet Count 113 (L) 150 - 450 10e3/uL   Troponin I    Result Value Ref Range    Troponin I 1.751 (HH) 0.000 - 0.045 ug/L   INR   Result Value Ref Range    INR 3.41 (H) 0.85 - 1.15   Extra Tube    Narrative    The following orders were created for panel order Extra Tube.  Procedure                               Abnormality         Status                     ---------                               -----------         ------                     Extra Purple Top Tube[[id number]]                            Final result                 Please view results for these tests on the individual orders.   Extra Purple Top Tube   Result Value Ref Range    Hold Specimen JI    Basic metabolic panel   Result Value Ref Range    Sodium 136 133 - 144 mmol/L    Potassium 3.7 3.4 - 5.3 mmol/L    Chloride 106 94 - 109 mmol/L    Carbon Dioxide (CO2) 23 20 - 32 mmol/L    Anion Gap 7 3 - 14 mmol/L    Urea Nitrogen 30 7 - 30 mg/dL    Creatinine 0.99 0.66 - 1.25 mg/dL    Calcium 8.0 (L) 8.5 - 10.1 mg/dL    Glucose 89 70 - 99 mg/dL    GFR Estimate 75 >60 mL/min/1.73m2   CBC with platelets   Result Value Ref Range    WBC Count 8.9 4.0 - 11.0 10e3/uL    RBC Count 4.34 (L) 4.40 - 5.90 10e6/uL    Hemoglobin 13.7 13.3 - 17.7 g/dL    Hematocrit 39.9 (L) 40.0 - 53.0 %    MCV 92 78 - 100 fL    MCH 31.6 26.5 - 33.0 pg    MCHC 34.3 31.5 - 36.5 g/dL    RDW 12.7 10.0 - 15.0 %    Platelet Count 109 (L) 150 - 450 10e3/uL   Echocardiogram Complete   Result Value Ref Range    LVEF  45-50%     Narrative    [id number]  [id number]  [id number]  [id number]^JEFF^ROGE^RASHAWN     [facility],[place]  Echocardiography Laboratory  [address] [place] [id number]     Name: PAMELA GRANADOS  MRN: [id number]  : 1950  Study Date: 2021 09:33 AM  Age: 71 yrs  Gender: Male  Patient Location: [facility]  Reason For Study: Ischemic Heart Disease  Ordering Physician: ROGE AGUILAR  Performed By: Suzanne Laureano RDCS     BSA: 1.9 m2  Height: 72 in  Weight: 150 lb  HR: 61  BP: 111/65  mmHg  ______________________________________________________________________________  Procedure  Complete Portable Echo Adult. Contrast Optison. Technically difficult  study.Extremely poor acoustic windows. Patient was given 5 ml mixture of 3 ml  Optison and 6 ml saline. 4 ml wasted.  ______________________________________________________________________________  Interpretation Summary  Technically difficult study.Extremely poor acoustic windows.  The visual ejection fraction is 45-50%.  Right ventricular function, chamber size, wall motion, and thickness are  normal.  The inferior vena cava is normal.  No pericardial effusion is present.  ______________________________________________________________________________  Left Ventricle  Left ventricular wall thickness cannot evaluate. Left ventricular size is  normal. The visual ejection fraction is 45-50%. Left ventricular diastolic  function is indeterminate. Inferior wall akinesis is present.     Right Ventricle  Right ventricular function, chamber size, wall motion, and thickness are  normal.     Atria  Both atria appear normal.     Mitral Valve  The valve leaflets are not well visualized. On Doppler interrogation, there is  no significant stenosis or regurgitation.     Aortic Valve  Mild aortic valve calcification is present. The valve leaflets are not well  visualized. On Doppler interrogation, there is no significant stenosis or  regurgitation.     Tricuspid Valve  The valve leaflets are not well visualized. On Doppler interrogation, there is  no significant stenosis or regurgitation. The right ventricular systolic  pressure is approximated at 18.1 mmHg plus the right atrial pressure.     Pulmonic Valve  The pulmonic valve cannot be assessed.     Vessels  The thoracic aorta cannot be assessed. The pulmonary artery cannot be  assessed. The inferior vena cava is normal.     Pericardium  No pericardial effusion is present.      ______________________________________________________________________________  MMode/2D Measurements & Calculations  IVSd: 1.9 cm  LVIDd: 4.2 cm  LVIDs: 3.7 cm  LVPWd: 1.3 cm  FS: 13.2 %     LV mass(C)d: 272.0 grams  LV mass(C)dI: 144.3 grams/m2  Ao root diam: 3.0 cm  asc Aorta Diam: 2.7 cm  LVOT diam: 2.0 cm  LVOT area: 3.1 cm2  LA Volume (BP): 40.1 ml  LA Volume Index (BP): 21.2 ml/m2  RWT: 0.59     Doppler Measurements & Calculations  MV E max renato: 60.2 cm/sec  MV A max renato: 39.2 cm/sec  MV E/A: 1.5     MV dec slope: 422.0 cm/sec2  MV dec time: 0.14 sec  TR max renato: 213.0 cm/sec  TR max P.1 mmHg  E/E' av.5  Lateral E/e': 8.6  Medial E/e': 8.4     ______________________________________________________________________________  Report approved by: Del Martin 2021 11:00 AM           *Note: Due to a large number of results and/or encounters for the requested time period, some results have not been displayed. A complete set of results can be found in Results Review.       EKG 2021        TTE 2018  Interpretation Summary  This was a normal stress echocardiogram with no evidence of stress-induced  ischemia. Normal resting LV function with EF of approximately 60-65%; normal  response to exercise with increase to approximately 70-75%. No stress induced  regional wall motion abnormalities. No ECG evidence of ischemia. No  significant valvular disease noted on routine screening color flow Doppler and  pulsed Doppler examination. Normal functional capacity. Limiting Symptom (s) :  fatigue  The patient did not exhibit any symptoms during exercise.  Normal blood pressure response with stress.  Normal heart rate response to exercise.    TTE 2021  Interpretation Summary  Technically difficult study.Extremely poor acoustic windows.  The visual ejection fraction is 45-50%.  Right ventricular function, chamber size, wall motion, and thickness are  normal.  The inferior vena cava is normal.  No  pericardial effusion is present.

## 2021-08-20 NOTE — CONSULTS
[facility]    Cardiology Consult Note-Cards 1      Date of Admission:  8/19/2021  Consult Requested by: Hospital Medicine  Reason for Consult: Elevated Troponin    Assessment & Plan: [facility]   Haresh Rock is a 71 year old male admitted on 8/19/2021. He has atrial fibrillation (on metoprolol flecainide and Coumadin), history of steroid-induced type 2 diabetes (resolved), dyslipidemia,  myeloproliferative disorder status post bone marrow transplant, and recently diagnosed Parkinson disease primarily causing dysphagia presenting with presumed aspiration pneumonia in the setting of fevers chills cough and shortness of breath.  Found to have elevated troponin as part of infectious work-up.  Patient has no signs or symptoms of myocardial ischemia (no chest discomfort/angina; no ECG abnormalities), which makes non-ST elevation acute coronary syndrome unlikely.  Presentation most consistent with myocardial injury in the setting of sepsis due to pneumonia (demand ischemia).  He had several prior stress tests, but does not feel confident that he can exercise anymore due to his Parkinsons.  Additionally, stress-testing would be suboptimal in patient with acute infectious illness and sepsis. However, he does warrant an ischemic evaluation and may benefit from ischemic evaluation (stress testing) upon resolution of acute illness. Total cholesterol 110 mg/dL, TG 96 mg/dL, LDL-C 55 mg/dL on simvastatin 10 mg.     1.  Myocardial injury (demand ischemia) due to  2.  Sepsis due to  3.  Aspiration pneumonia due to  4.  Parkinson's disease associated dysphagia  5.  Atrial fibrillation    Recommendations:  -No indication for urgent or emergent cardiac catheterization/revascularization  -No indication for ACS protocol heparin at this time  -Trend troponin to peak  -Agree with 2D transthoracic echocardiogram with color-flow Doppler in the morning  -Continue metoprolol, flecainide, and  warfarin (goal INR 2-3)  -Ischemic evaluation upon resolution of illness  -Please contact our team via the [phone] with additional questions  -continue simvastatin    Risk factors for coronary artery disease include: age, dyslipidemia    Thank you for allowing us to participate in the care of this patient.  Cardiology will continue to follow in the morning.     The patient's care was discussed with the Patient and Primary team.    Faraz [place] MD, MSc  Cardiovascular Disease Fellow  [facility]  [id number]      ______________________________________________________________________    Chief Complaint   Fevers/rigors    History is obtained from the patient and electronic health record    History of Present Illness   Haresh Rock is a 71 year old male who has a history of atrial fibrillation on flecainide, metoprolol, and Coumadin, steroid-incuded type 2 diabetes (resolved, per patient) , history of Parkinson's disease related dysphagia, myeloproliferative disorder status post bone marrow transplant in 2007, presenting for evaluation of fevers/chills and cough with shortness of breath found to have aspiration pneumonia.  A troponin was ordered as part of his work-up at a local emergency room which was mildly elevated at 0.2.  Upon presentation to [facility], troponin salvador to 0.59 and 0.66, then 2.6.  Cardiology consulted for evaluation.    Patient has known history of coronary artery disease and has had multiple negative exercise stress test in recent years.  He reports no history of exertional or nonexertional chest pain that would raise suspicion for angina.  At baseline, is able to walk up 2 flights of stairs without any issue and able to use a push lawnmower for 30 minutes uninterrupted.  He previously had steroid-induced diabetes but states this improved after he no longer required steroids for immune suppression.  He is a never smoker and has no family history of coronary artery  disease.    Upon further review of history, he notes that Sunday evening he began having fevers and rigors in the setting of cough and weakness.  He has dysphagia related to Parkinson's disease and has chronic cough as a result, exacerbated if he does not eat his food slowly.  For the past 5 days, he feels he has been having fevers cough and shortness of breath.  Checked his temperature at home was 101 Fahrenheit, improved with Tylenol.  He notes one episode of sharp stabbing chest pain when he rolled out of bed yesterday.  Pain was located very laterally and newly under his axilla, and improved when he got up and moved his arms.  He notes this is not happened before and has not happened since.  Denies any palpitations throughout this time.  No orthopnea lower extremity edema or PND.    Review of Systems   The 10 point Review of Systems is negative other than noted in the HPI or here.     Past Medical History    I have reviewed this patient's medical history and updated it with pertinent information if needed.   Past Medical History:   Diagnosis Date     Abnormal dopamine scan (DaTSCAN) 2020 11/04/2020    [phone] 11/3/2020   Narrative & Impression   Examination: Nuclear medicine DATscan for Dopamine Receptor Localization.   Examination: NM Brain Image Tomographic (SPECT) DATscan   Date: 11/3/2020 3:21 PM   Indication: Parkinsonism   Comparison: None   Additional Information: none   Interfering Medications: None   Technique:   The patient initially received 1 ml of Lugol's solution orally prior     Antiphospholipid antibody syndrome (H) 2005    Ravi's     Antiphospholipid antibody syndrome (H)     Ravi's, noted negative per testing re-done in 2008 in hematology note 01/13/2009     Articulation disorder 09/23/2020     Atrial fibrillation (H) 04/2011     Benign prostatic hyperplasia with urinary retention      Cirrhosis (H)      CKD (chronic kidney disease) stage 2, GFR 60-89 ml/min      Diabetes mellitus type II      steroid induced     DJD (degenerative joint disease) of knee     SHAWN, worse on right     DVT (deep venous thrombosis) (H)      ED (erectile dysfunction)      Gallbladder polyp 05/2010     Wwvir-Kdgymu-Nlij Disease 2007    BMT     Hemochromatosis      Hodgkin's disease NOS     5 cycles of ABVD in 2011     HTN (hypertension)      Hyperlipidaemia LDL goal <100      Multiple thyroid nodules     benign      Myeloproliferative disorder (H)     atypical, [facility]     Parkinson disease (H) 09/24/2020     PE (pulmonary embolism) 11/2004     Polyneuropathy      Primary pulmonary hypertension (H)      Thrombocytopenia (H) 06/08/2021       Past Surgical History   I have reviewed this patient's surgical history and updated it with pertinent information if needed.  Past Surgical History:   Procedure Laterality Date     ANESTHESIA CARDIOVERSION  4/21/2011    Procedure:ANESTHESIA CARDIOVERSION; Surgeon:GENERIC ANESTHESIA PROVIDER; Location:[facility] OR     ARTHROSCOPY KNEE RT/LT      LT     CATARACT IOL, RT/LT  2017     COLONOSCOPY  10/16/2012    Procedure: COLONOSCOPY;  Colonoscopy, screening;  Surgeon: Reg Feliciano MD;  Location: [facility] OR     COLONOSCOPY N/A 4/14/2021    Procedure: COLONOSCOPY;  Surgeon: Reg Holm MD;  Location: [facility] GI     ESOPHAGOSCOPY, GASTROSCOPY, DUODENOSCOPY (EGD), COMBINED N/A 10/7/2020    Procedure: ESOPHAGOGASTRODUODENOSCOPY, WITH BIOPSY;  Surgeon: Raul Sawyer DO;  Location: [facility] OR     H ABLATION FOCAL AFIB  3/5/2013    PVI     H ABLATION FOCAL AFIB  01/01/2018     HC BONE MARROW/STEM TRANSPLANT ALLOGENIC  5/8/2007     INSERT PORT VASCULAR ACCESS       JOINT REPLACEMTN, KNEE RT/LT  3/28/12    SHAWN     VASECTOMY  1990       Social History   I have reviewed this patient's social history and updated it with pertinent information if needed.  Social History     Tobacco Use     Smoking status: Never Smoker     Smokeless tobacco: Never Used   Substance Use Topics     Alcohol use: No     Drug use: No      Family History   I have reviewed this patient's family history and updated it with pertinent information if needed.   I have reviewed this patient's family history and updated it with pertinent information if needed.  Family History   Problem Relation Age of Onset     Hypertension Mother      Cerebrovascular Disease Mother      Breast Cancer Mother      Arrhythmia Brother      Arrhythmia Sister         supraventricular tachycardia     Thyroid Disease Sister      Other - See Comments Son         lives in [place]. no kids and not .     Obsessive Compulsive Disorder Son      Depression Son      Eating Disorder Son         eats when depressed     Obesity Son      Thyroid Cancer Daughter         had thyroid removed     Bipolar Disorder Daughter      Other - See Comments Daughter         lives in [place] - single with one son 9  yrs     Other - See Comments Sister      Alzheimer Disease Father      Diabetes Paternal Aunt      Gastrointestinal Disease Maternal Grandmother         colitis      Parkinsonism Other         2 cousins with parkinson     C.A.D. No family hx of        Medications   Current Facility-Administered Medications   Medication     acetaminophen (TYLENOL) tablet 975 mg     azithromycin (ZITHROMAX) tablet 250 mg     bisacodyl (DULCOLAX) Suppository 10 mg     cefTRIAXone (ROCEPHIN) 1 g vial to attach to  mL bag for ADULTS or NS 50 mL bag for PEDS     dutasteride (AVODART) capsule 0.5 mg     flecainide (TAMBOCOR) tablet 50 mg     lidocaine (LMX4) cream     lidocaine 1 % 0.1-1 mL     melatonin tablet 1 mg     metoprolol succinate ER (TOPROL-XL) 24 hr tablet 25 mg     ondansetron (ZOFRAN-ODT) ODT tab 4 mg    Or     ondansetron (ZOFRAN) injection 4 mg     Patient is already receiving anticoagulation with heparin, enoxaparin (LOVENOX), warfarin (COUMADIN)  or other anticoagulant medication     polyethylene glycol (MIRALAX) Packet 17 g     prochlorperazine (COMPAZINE) injection 5 mg     Or     prochlorperazine (COMPAZINE) tablet 5 mg    Or     prochlorperazine (COMPAZINE) suppository 12.5 mg     senna-docusate (SENOKOT-S/PERICOLACE) 8.6-50 MG per tablet 2 tablet     simvastatin (ZOCOR) tablet 10 mg     sodium chloride (PF) 0.9% PF flush 3 mL     sodium chloride (PF) 0.9% PF flush 3 mL     Warfarin Therapy Reminder (Check START DATE - warfarin may be starting in the FUTURE)     warfarin-No DOSE today     Current Outpatient Medications   Medication Sig     acetaminophen (TYLENOL) 500 MG tablet 500mg tab as needed     amoxicillin (AMOXIL) 500 MG capsule 4 x 500mg tabs by mouth 1 hour before dental appointments.     bisacodyl (DULCOLAX) 5 MG EC tablet Refer to instructions sent via [employer].     calcium carbonate (TUMS) 500 MG chewable tablet 1 tab by mouth in morning or at night as needed     carbidopa-levodopa (SINEMET)  MG tablet Start with 1/2 tab daily x 3days then 1/2 tab twice daily for 3-7 days then 1 tab twice daily for one week then 1 tablet 3 times per day. The dose can be increased if there is no significant benefit. IT may take 1-3 weeks before benefit is seen  To lessen the effects that protein has on the effectiveness of sinemet (i.e.. Levodopa), it is best to take this medication on an empty stomach one hour before or two hours after meals.     If you are experiencing nausea with the medication, then you should take the medication with a CRACKER, GINGER TABLETS OR WITH FOOD. CALL IF STILL HAVING NAUSEA AS WE CAN TRY EXTRA CARBIDOPA, TRIMETHOBENZAMIDE ETC.     Besides nausea there are other side effects including the rare occurrence of a rash to the yellow dye in the sinemet tablets if you are taking the 25/100 formulation.     Iron may interfere with the effectiveness of this medication so do not take iron supplements at the same time you are taking sinemet.     cephALEXin (KEFLEX) 750 MG capsule Take 1 capsule by mouth daily (Patient not taking: Reported on 8/17/2021)     docusate  sodium (COLACE) 50 MG capsule Take 1 capsule (50 mg) by mouth 2 times daily as needed for constipation     dutasteride (AVODART) 0.5 MG capsule Take 1 capsule (0.5 mg) by mouth daily     flecainide (TAMBOCOR) 50 MG tablet Take 1 tablet (50 mg) by mouth 2 times daily     fluticasone (FLONASE) 50 MCG/ACT nasal spray Spray 1 spray into both nostrils daily as needed for rhinitis     loratadine (CLARITIN) 10 MG tablet Take 10 mg by mouth daily as needed      metoprolol succinate ER (TOPROL XL) 25 MG 24 hr tablet Take 1 tablet (25 mg) by mouth daily In the evening     multivitamin (CENTRUM SILVER) tablet Take 1 tablet by mouth daily     Nutritional Supplements (ENSURE PO)      polyethylene glycol (MIRALAX) 17 g packet Take 1 packet by mouth daily as needed for constipation     PREVIDENT 5000 DRY MOUTH 1.1 % GEL topical gel Take by mouth daily     senna-docusate (SENOKOT-S/PERICOLACE) 8.6-50 MG tablet Take 2 tablets by mouth daily     simvastatin (ZOCOR) 10 MG tablet Take 1 tablet (10 mg) by mouth daily     vitamin D3 (CHOLECALCIFEROL) 50 mcg (2000 units) tablet Take 1 tablet (50 mcg) by mouth daily     warfarin ANTICOAGULANT (COUMADIN) 2.5 MG tablet TAKE 1.25 MG (1/2 TABLET) MON/FRI AND 2.5 MG (1 TABLET) ALL OTHER DAYS. NEED APPT. (Patient taking differently: Taking 2.5mg daily. Fridays 3.75mg)       Allergies   Allergies   Allergen Reactions     Seasonal Allergies        Physical Exam   Vital Signs: Temp: 98  F (36.7  C) Temp src: Oral BP: 111/69 Pulse: 74   Resp: 18 SpO2: 97 % O2 Device: None (Room air)    Weight: 150 lbs 0 oz    General Appearance: brightly alert, oriented x3,  NAD  Eyes: PERRLA EOMI  HEENT: atraumatic, neck supple,   Respiratory: CTAB  Cardiovascular: rrr, no m/r/g; PMI at 5th ICS x MCL; 2+ carotid, radial,  Brachial,  Femoral, popliteal, DP, PT pulses b/l  GI: soft nt/nd  Lymph/Hematologic: no ecchymosis  Genitourinary: adult diaper in place  Skin: warm, well perfused  Musculoskeletal: 5/5 strength  in upper/lower ext b/l  Neurologic: CN II-XII grossly intact  Psychiatric: normal affect, responds appropriately to questions        Data   I personally reviewed the EKG tracing showing sinus rhythm, rate 64 bpm, no ST/TW abnormalites.  Results for orders placed or performed during the hospital encounter of 08/19/21 (from the past 24 hour(s))   Troponin I   Result Value Ref Range    Troponin I 0.586 (HH) 0.000 - 0.045 ug/L   [place] Draw    Narrative    The following orders were created for panel order [place] Draw.  Procedure                               Abnormality         Status                     ---------                               -----------         ------                     Extra Blue Top Tube[[id number]]                              Final result               Extra Purple Top Tube[[id number]]                            Final result                 Please view results for these tests on the individual orders.   Extra Blue Top Tube   Result Value Ref Range    Hold Specimen JIC    Extra Purple Top Tube   Result Value Ref Range    Hold Specimen JIC    INR   Result Value Ref Range    INR 2.98 (H) 0.85 - 1.15   CBC with platelets   Result Value Ref Range    WBC Count 16.4 (H) 4.0 - 11.0 10e3/uL    RBC Count 4.48 4.40 - 5.90 10e6/uL    Hemoglobin 14.2 13.3 - 17.7 g/dL    Hematocrit 41.3 40.0 - 53.0 %    MCV 92 78 - 100 fL    MCH 31.7 26.5 - 33.0 pg    MCHC 34.4 31.5 - 36.5 g/dL    RDW 12.7 10.0 - 15.0 %    Platelet Count 122 (L) 150 - 450 10e3/uL   EKG 12 lead   Result Value Ref Range    Systolic Blood Pressure  mmHg    Diastolic Blood Pressure  mmHg    Ventricular Rate 64 BPM    Atrial Rate 64 BPM    DE Interval 188 ms    QRS Duration 80 ms     ms    QTc 435 ms    P Axis 60 degrees    R AXIS -12 degrees    T Axis 29 degrees    Interpretation ECG Sinus rhythm  Normal ECG      EKG 12 lead   Result Value Ref Range    Systolic Blood Pressure  mmHg    Diastolic Blood Pressure  mmHg    Ventricular Rate 64 BPM     Atrial Rate 64 BPM    ND Interval 180 ms    QRS Duration 84 ms     ms    QTc 441 ms    P Axis 52 degrees    R AXIS 27 degrees    T Axis 46 degrees    Interpretation ECG Sinus rhythm  Normal ECG      Chest XR,  PA & LAT    Narrative    EXAM: XR CHEST 2 VW  8/19/2021 5:46 PM     HISTORY:  Cough, fever       COMPARISON:  Chest x-ray 6/8/2021    FINDINGS:   PA and lateral views of the chest. The trachea is midline. Chronic  silhouette is within normal limits. Asymmetric elevation of the right  hemidiaphragm with blunting of the right costophrenic angle. Patchy  airspace opacities in the bilateral lower lung zones, right greater  than left. No pleural effusion or pneumothorax. Osseous structures are  within normal limits.      Impression    IMPRESSION:   Airspace opacities in the bilateral lower lung zones, suspicious for  infection.    I have personally reviewed the examination and initial interpretation  and I agree with the findings.    NAOMI WILLIAM MD         SYSTEM ID:  [id number]   Troponin I   Result Value Ref Range    Troponin I 0.660 (HH) 0.000 - 0.045 ug/L   Troponin I   Result Value Ref Range    Troponin I 2.375 (HH) 0.000 - 0.045 ug/L   Comprehensive metabolic panel   Result Value Ref Range    Sodium 138 133 - 144 mmol/L    Potassium 3.7 3.4 - 5.3 mmol/L    Chloride 104 94 - 109 mmol/L    Carbon Dioxide (CO2) 24 20 - 32 mmol/L    Anion Gap 10 3 - 14 mmol/L    Urea Nitrogen 32 (H) 7 - 30 mg/dL    Creatinine 1.15 0.66 - 1.25 mg/dL    Calcium 8.2 (L) 8.5 - 10.1 mg/dL    Glucose 139 (H) 70 - 99 mg/dL    Alkaline Phosphatase 153 (H) 40 - 150 U/L    AST 59 (H) 0 - 45 U/L    ALT 27 0 - 70 U/L    Protein Total 7.2 6.8 - 8.8 g/dL    Albumin 2.7 (L) 3.4 - 5.0 g/dL    Bilirubin Total 1.0 0.2 - 1.3 mg/dL    GFR Estimate 63 >60 mL/min/1.73m2   EKG 12-lead, complete   Result Value Ref Range    Systolic Blood Pressure  mmHg    Diastolic Blood Pressure  mmHg    Ventricular Rate 66 BPM    Atrial Rate 66 BPM    ND  Interval 178 ms    QRS Duration 80 ms     ms    QTc 478 ms    P Axis 94 degrees    R AXIS -13 degrees    T Axis 39 degrees    Interpretation ECG       Sinus rhythm  T wave abnormality, consider anterior ischemia  Abnormal ECG     INR   Result Value Ref Range    INR 3.34 (H) 0.85 - 1.15   Basic metabolic panel   Result Value Ref Range    Sodium 136 133 - 144 mmol/L    Potassium 3.5 3.4 - 5.3 mmol/L    Chloride 103 94 - 109 mmol/L    Carbon Dioxide (CO2) 28 20 - 32 mmol/L    Anion Gap 5 3 - 14 mmol/L    Urea Nitrogen 32 (H) 7 - 30 mg/dL    Creatinine 1.09 0.66 - 1.25 mg/dL    Calcium 8.1 (L) 8.5 - 10.1 mg/dL    Glucose 188 (H) 70 - 99 mg/dL    GFR Estimate 67 >60 mL/min/1.73m2   CBC with platelets   Result Value Ref Range    WBC Count 10.0 4.0 - 11.0 10e3/uL    RBC Count 4.13 (L) 4.40 - 5.90 10e6/uL    Hemoglobin 13.3 13.3 - 17.7 g/dL    Hematocrit 37.2 (L) 40.0 - 53.0 %    MCV 90 78 - 100 fL    MCH 32.2 26.5 - 33.0 pg    MCHC 35.8 31.5 - 36.5 g/dL    RDW 12.9 10.0 - 15.0 %    Platelet Count 113 (L) 150 - 450 10e3/uL     *Note: Due to a large number of results and/or encounters for the requested time period, some results have not been displayed. A complete set of results can be found in Results Review.     Recent Labs   Lab Test 01/15/21  0817 08/20/20  1033 11/16/15  1155 07/09/14  1511   CHOL 110 121 126 168   HDL 36* 31* 31* 29*   LDL 55 64 71 104   TRIG 96 131 119 174*   CHOLHDLRATIO  --   --  4.1 5.8*

## 2021-08-20 NOTE — H&P
[facility]    History and Physical - Hospitalist Service, Gold Nights       Date of Admission:  8/19/2021    Assessment & Plan      Haresh Rock is a 71 year old male admitted on 8/19/202 from the ED where he presented after being seen in Urgent Care where he was found to have an elevated troponin. He first experienced a fever and hemoptysis Geraldo 8/15/21. He tested COVID negative today. He again experienced fever and hemoptysis last night with the addition of   chest pain which he associated with the cough and possible aspiration pneumonia for which has has been seen. On presenting with his son to Urgent Care he learned that his troponin was elevated to 0.205 and was sent to the ED.     #Elevated Troponin  #NSTEMI  Elevated troponin has elevated from 0.205-0.660 since this afternoon.  No intervention needed at this time but educated patient to alert nursing to any change in pain or symptoms so that EKG can be repeated. Patient is not currently having chest pain.   -Continue to trend troponin  -EKG shows no sign of ischemia at this time.   -Continue PTA  Warfarin (today INR is 2.8)    #Pneumonia (likely aspiration)  #leukocytosis  #Elevated Sed Rate  Chest XR today shows infection mos likely due to aspiration. Lactate 1.6. Sed rate has been elevated since May. WBC count is 16.8 today with increased neutrophils. Sputum cultures collected today at urgent care and are being cultured. Fungal and AFB in process. Aerobes sample was contaminated and will need to be collected again.   -Start Zithromax and ceftriaxone  -Consider collecting sputum for new respiratory aerobe culture and gram stain.     #Parkinson Disease  #Dysphasia  Patient had a DaTscan 11/3/2020. Swallowing with aspiration demonstrated in July via video esophagram. Patient was to start Levodopa treatment but this was deferred per neurology due to elevated sed rate.   -Patient has a list of foods to avoid but  otherwise has not yet required thickened liquids or a specific diet  -Dietary consult  -Speech therapist   -Fall precautions    #Thrombocytopenia  Platelets 129 today.     #Weight loss  Patient believes he has lost about 60 lbs over the last year with about 10 being just since the spring.   -Dietary consult    #Chronic constipation  Constipation has been ongoing for some time. Somewhat improved at this time.   -Continue PTA Dulcolax PRN    #LUTS symptoms  -Continue PTA dutasteride, flecainide    #Hyperlipidemia  -Continue PTA simvastatin    #DMII  Patient has not taken oral medication or insulin since 2011. He reports diabetes reversed likely due to weight loss but glucose readings are consistently high. Today glucose was 166. Last A1c was 5.4 on 5/25/21.  -Order A1c    #CKD  #Proteinuria  Bun today is 33. Bun/Creatinine is 25. GFR is 54 which is consistent with Stage 3 CKD.Urine albumin was 62.45 on 7/27/21  -Continue to monitor kidney function.   -Continue to be conscious of renally eliminated drugs.     Diet:   Normal  DVT Prophylaxis: Warfarin  Dill Catheter: Not present  Central Lines: None  Code Status:   Full    Clinically Significant Risk Factors Present on Admission             # Coagulation Defect: home medication list includes an anticoagulant medication       Disposition Plan   Expected discharge: {Update expected discharge date from storyboard & refresh ;***} recommended to prior living arrangement once safe disposition plan/ TCU bed available and troponin level ceases to rise.      The patient's care was discussed with the Attending Physician, Dr. García, Patient and Patient's Family.    Marie MCNEIL-S2  [facility]  Securely message with the Vocera Web Console (learn more here)  Text page via [facility] Paging/Directory  Please see sign in/sign out for up to date coverage  information    ______________________________________________________________________    Chief Complaint   Chest pain and heoptysis    History is obtained from the patient with occasional help from daughter, Gemma who was present for interview.     History of Present Illness   Haresh Rock is a 71 year old male who presents with his daughter, Gemma and a past medical history significant for DMII (possibly resolved), CKD Stage II, cirrhosis, PE, pulmonary hypertension, hyperlipidemia, HTN, Hodgkin's Disease, DVT, Atrial Fibrillation, Parkinson Disease (with dysphasia), myeloproliferative disorder, graft vs host disease. He is presenting today after being referred from urgent care for an elevated troponin level. His symptoms began Sunday and include fever (TMax 101), chill and hemoptysis. He denies chest pain or shortness of breath currently. He does not have any history of CAD. He has had no sick contacts and has not been otherwise ill. Around midnight of 8/19/21 he felt feverish and had a temperature of 101. He took some Tylenol and continued to monitor his temperature overnight which elevated slightly a second time and returned to normal.     Patient was seen in the ED for inability to urinate on 7/5/21 and a Dill catheter was placed (catheter removed 8/3/21).  Patient then had BPH surgery Aug 9th which has made it easier to urinate but he continues to have incontinence requiring the use of a brief.     Patient and daughter also express concern about the dramatic weight loss of about 60 lbs over the last year and a half. 10 lbs just since spring of this year. Mr. Rock attributes this due to lack of appetite and the increased time and effort required to chew and swallow his food safely.       Review of Systems    Review of Systems   Constitutional: Positive for chills, fatigue, fever and unexpected weight change.   HENT: Positive for trouble swallowing and voice change. Negative for congestion, ear pain and sore  throat.    Respiratory: Positive for cough. Negative for shortness of breath.    Gastrointestinal: Negative for diarrhea, nausea and vomiting.   Genitourinary: Positive for frequency and urgency. Negative for difficulty urinating.   Neurological: Positive for weakness and numbness. Negative for tremors.   All other systems reviewed and are negative.      Past Medical History    I have reviewed this patient's medical history and updated it with pertinent information if needed.   Past Medical History:   Diagnosis Date     Abnormal dopamine scan (DaTSCAN) 2020 11/04/2020    [phone] 11/3/2020   Narrative & Impression   Examination: Nuclear medicine DATscan for Dopamine Receptor Localization.   Examination: NM Brain Image Tomographic (SPECT) DATscan   Date: 11/3/2020 3:21 PM   Indication: Parkinsonism   Comparison: None   Additional Information: none   Interfering Medications: None   Technique:   The patient initially received 1 ml of Lugol's solution orally prior     Antiphospholipid antibody syndrome (H) 2005    Ravi's     Antiphospholipid antibody syndrome (H)     Ravi's, noted negative per testing re-done in 2008 in hematology note 01/13/2009     Articulation disorder 09/23/2020     Atrial fibrillation (H) 04/2011     Benign prostatic hyperplasia with urinary retention      Cirrhosis (H)      CKD (chronic kidney disease) stage 2, GFR 60-89 ml/min      Diabetes mellitus type II     steroid induced     DJD (degenerative joint disease) of knee     SHAWN, worse on right     DVT (deep venous thrombosis) (H)      ED (erectile dysfunction)      Gallbladder polyp 05/2010     Boknp-Rhjuld-Tryn Disease 2007    BMT     Hemochromatosis      Hodgkin's disease NOS     5 cycles of ABVD in 2011     HTN (hypertension)      Hyperlipidaemia LDL goal <100      Multiple thyroid nodules     benign      Myeloproliferative disorder (H)     atypical, [facility]     Parkinson disease (H) 09/24/2020     PE (pulmonary embolism) 11/2004      Polyneuropathy      Primary pulmonary hypertension (H)      Thrombocytopenia (H) 06/08/2021       Past Surgical History   I have reviewed this patient's surgical history and updated it with pertinent information if needed.  Past Surgical History:   Procedure Laterality Date     ANESTHESIA CARDIOVERSION  4/21/2011    Procedure:ANESTHESIA CARDIOVERSION; Surgeon:GENERIC ANESTHESIA PROVIDER; Location:[facility] OR     ARTHROSCOPY KNEE RT/LT      LT     CATARACT IOL, RT/LT  2017     COLONOSCOPY  10/16/2012    Procedure: COLONOSCOPY;  Colonoscopy, screening;  Surgeon: Reg Feliciano MD;  Location: [facility] OR     COLONOSCOPY N/A 4/14/2021    Procedure: COLONOSCOPY;  Surgeon: Reg Holm MD;  Location: [facility] GI     ESOPHAGOSCOPY, GASTROSCOPY, DUODENOSCOPY (EGD), COMBINED N/A 10/7/2020    Procedure: ESOPHAGOGASTRODUODENOSCOPY, WITH BIOPSY;  Surgeon: Raul Sawyer DO;  Location: [facility] OR     H ABLATION FOCAL AFIB  3/5/2013    PVI     H ABLATION FOCAL AFIB  01/01/2018     HC BONE MARROW/STEM TRANSPLANT ALLOGENIC  5/8/2007     INSERT PORT VASCULAR ACCESS       JOINT REPLACEMTN, KNEE RT/LT  3/28/12    SHAWN     VASECTOMY  1990       Social History   I have reviewed this patient's social history and updated it with pertinent information if needed.  Social History     Tobacco Use     Smoking status: Never Smoker     Smokeless tobacco: Never Used   Substance Use Topics     Alcohol use: No     Drug use: No       Family History   I have reviewed this patient's family history and updated it with pertinent information if needed.  Family History   Problem Relation Age of Onset     Hypertension Mother      Cerebrovascular Disease Mother      Breast Cancer Mother      Arrhythmia Brother      Arrhythmia Sister         supraventricular tachycardia     Thyroid Disease Sister      Other - See Comments Son         lives in [place]. no kids and not .     Obsessive Compulsive Disorder Son      Depression Son      Eating Disorder Son          eats when depressed     Obesity Son      Thyroid Cancer Daughter         had thyroid removed     Bipolar Disorder Daughter      Other - See Comments Daughter         lives in [place] - single with one son 9  yrs     Other - See Comments Sister      Alzheimer Disease Father      Diabetes Paternal Aunt      Gastrointestinal Disease Maternal Grandmother         colitis      Parkinsonism Other         2 cousins with parkinson     C.A.D. No family hx of        Prior to Admission Medications   Prior to Admission Medications   Prescriptions Last Dose Informant Patient Reported? Taking?   HYDROcodone-acetaminophen (NORCO) 5-325 MG tablet   Yes No   Sig: Take 1 tablet by mouth daily   Nutritional Supplements (ENSURE PO)   Yes No   PREVIDENT 5000 DRY MOUTH 1.1 % GEL topical gel   Yes No   Sig: Take by mouth daily   acetaminophen (TYLENOL) 500 MG tablet   Yes No   Simg tab as needed   amoxicillin (AMOXIL) 500 MG capsule   Yes No   Si x 500mg tabs by mouth 1 hour before dental appointments.   bisacodyl (DULCOLAX) 5 MG EC tablet   No No   Sig: Refer to instructions sent via [employer].   calcium carbonate (TUMS) 500 MG chewable tablet   Yes No   Si tab by mouth in morning or at night as needed   carbidopa-levodopa (SINEMET)  MG tablet   No No   Sig: Start with 1/2 tab daily x 3days then 1/2 tab twice daily for 3-7 days then 1 tab twice daily for one week then 1 tablet 3 times per day. The dose can be increased if there is no significant benefit. IT may take 1-3 weeks before benefit is seen  To lessen the effects that protein has on the effectiveness of sinemet (i.e.. Levodopa), it is best to take this medication on an empty stomach one hour before or two hours after meals.     If you are experiencing nausea with the medication, then you should take the medication with a CRACKER, GINGER TABLETS OR WITH FOOD. CALL IF STILL HAVING NAUSEA AS WE CAN TRY EXTRA CARBIDOPA, TRIMETHOBENZAMIDE ETC.     Besides  nausea there are other side effects including the rare occurrence of a rash to the yellow dye in the sinemet tablets if you are taking the 25/100 formulation.     Iron may interfere with the effectiveness of this medication so do not take iron supplements at the same time you are taking sinemet.   cephALEXin (KEFLEX) 750 MG capsule   Yes No   Sig: Take 1 capsule by mouth daily   Patient not taking: Reported on 8/17/2021   docusate sodium (COLACE) 50 MG capsule   No No   Sig: Take 1 capsule (50 mg) by mouth 2 times daily as needed for constipation   dutasteride (AVODART) 0.5 MG capsule   No No   Sig: Take 1 capsule (0.5 mg) by mouth daily   flecainide (TAMBOCOR) 50 MG tablet   No No   Sig: Take 1 tablet (50 mg) by mouth 2 times daily   fluticasone (FLONASE) 50 MCG/ACT nasal spray   No No   Sig: Spray 1 spray into both nostrils daily as needed for rhinitis   loratadine (CLARITIN) 10 MG tablet   Yes No   Sig: Take 10 mg by mouth daily as needed    metoprolol succinate ER (TOPROL XL) 25 MG 24 hr tablet   No No   Sig: Take 1 tablet (25 mg) by mouth daily In the evening   multivitamin (CENTRUM SILVER) tablet   Yes No   Sig: Take 1 tablet by mouth daily   polyethylene glycol (MIRALAX) 17 g packet   Yes No   Sig: Take 1 packet by mouth daily as needed for constipation   senna-docusate (SENOKOT-S/PERICOLACE) 8.6-50 MG tablet   Yes No   Sig: Take 2 tablets by mouth daily   simvastatin (ZOCOR) 10 MG tablet   No No   Sig: Take 1 tablet (10 mg) by mouth daily   vitamin D3 (CHOLECALCIFEROL) 50 mcg (2000 units) tablet   Yes No   Sig: Take 1 tablet (50 mcg) by mouth daily   warfarin ANTICOAGULANT (COUMADIN) 2.5 MG tablet   No No   Sig: TAKE 1.25 MG (1/2 TABLET) MON/FRI AND 2.5 MG (1 TABLET) ALL OTHER DAYS. NEED APPT.   Patient taking differently: Taking 2.5mg daily. Fridays 3.75mg      Facility-Administered Medications: None     Allergies   Allergies   Allergen Reactions     Seasonal Allergies        Physical Exam   Vital Signs:  Temp: 98  F (36.7  C) Temp src: Oral BP: 111/69 Pulse: 74   Resp: 18 SpO2: 99 % O2 Device: None (Room air)    Weight: 150 lbs 0 oz    Physical Exam  Constitutional:       Appearance: He is cachectic.   HENT:      Head: Normocephalic.   Eyes:      Extraocular Movements: Extraocular movements intact.   Cardiovascular:      Rate and Rhythm: Normal rate and regular rhythm.      Heart sounds: Normal heart sounds.   Pulmonary:      Breath sounds: Normal breath sounds.   Abdominal:      General: Abdomen is scaphoid.   Musculoskeletal:      Right lower leg: No edema.      Left lower leg: No edema.   Skin:     Coloration: Skin is sallow.   Neurological:      Mental Status: He is alert.   Psychiatric:         Mood and Affect: Mood and affect normal.         Speech: Speech is delayed.         Behavior: Behavior is slowed.       Data   Data reviewed today: I reviewed all medications, new labs and imaging results over the last 24 hours.    Recent Labs   Lab 08/19/21  1753 08/19/21  1635 08/13/21  1408   INR  --   --  1.1   TROPONIN 0.660* 0.586*  --      Recent Results (from the past 24 hour(s))   Chest XR,  PA & LAT    Impression    RESIDENT PRELIMINARY INTERPRETATION  IMPRESSION:   Airspace opacities in the bilateral lower lung zones, suspicious for  infection.

## 2021-08-20 NOTE — PROGRESS NOTES
Medicine cross cover note -    Called re: worsened troponin elevation to 2.37.      I spoke with cardiology who will see the patient tonight.  They advise starting a heparin drip once his coumadin is no longer therapeutic.    - Will hold coumadin, check an INR now and again in the am.  Please order heparin drip once indicated.    Addendum - Spoke with cardiology after they saw the patient.  They now recommend holding on heparin, continuing the patient on coumadin and following troponins to peak.  They will follow.  EKG reviewed and shows T wave inversion in septal leads.

## 2021-08-20 NOTE — PROGRESS NOTES
[facility]    Medicine Progress Note - Hospitalist Service, Gold 8       Date of Admission:  8/19/2021    Assessment & Plan           Haresh Rock is a 71 year old man admitted on 8/19/2021. He has a history of diet-controlled diabetes type 2, CKD, DVT, A. fib ablation (on warfarin, flecanide, metop), antiphospholipid antibody syndrome, Parkinson's disease (hx of dysphagia) and Hodgkin's lymphoma s/p BMT in 2007 presenting with fever, leukocytosis and R-sided infiltrate consistent with aspiration PNA which is improvng. He also had demand ischemia 2/2 PNA but denies any chest pain or SOB this AM with down trending troponin which makes ACS unlikely.    # CAP, Suspected aspiration pneumonia: improving  # Dysphagia  -S/p zosyn at  8/19. Continue ceftriaxone and azithro.  -- WBC, fever curve, cough improved  -Speech evaluated: recommend minced and moist diet with mildly thick liquids. Needs VFSS but this can be done as outpatient  -- COVID negative  - Sputum cx 8/19 growing mixed maury    # Severe malnutrition - The patient has temporal wasting and significant weight loss over the course of the year likely secondary to dysphagia and his Parkinson's.  -Consult nutrition    # Myocardial Demand Ischemia 2/2 Sepsis - The patient had an episode of chest pain last night associated with deep breathing and hemoptysis. Troponins peaked at 2.375 and down trending. No active chest pain.  -- Received ASA.  Already anticoagulated on coumadin.  -- No ischemic changes on ECG  -  Cardiology consulted: No indication for ACS protocol heparin  -- Follow up echo results  -- Will need ischemic eval after resolution of illness (stress testing)  -- Continue telemetry     # JOANNA  - Improved to baseline since presentation     # History of diabetes type 2  -Diet controlled    # History of DVT, antiphospholipid antibody syndrome  -Continue home Coumadin, pharmacy to dose    # Afib   -- Continue  warfarin   -- Continue home flecainide    #  History of Parkinson's  # L foot drop  - Not currently on carbidopa-levodopa  - Following with outpatient neurology     # Hematuria  # Pyuria/Bacteruria  - Recent TURP. Saw Urology 8/17.  - Asymptomatic but would be covered with ceftriaxone    # Chronic Thrombocytopenia  # History of atypical myeloproliferative syndrom s/p allo-sib stem cell transplant  - Since 2012, 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  - Follows closely with BMT       Diet: Minced & Moist Diet (level 5) Mildly Thick (level 2)    DVT Prophylaxis: Warfarin  Dill Catheter: Not present  Central Lines: None  Code Status: Full Code      Disposition Plan   Expected discharge: 08/21/2021 recommended to prior living arrangement once antibiotic plan established.     The patient's care was discussed with the Patient.    Deidra Jean-Baptiste MD  Hospitalist Service, [address]  Securely message with the Vocera Web Console (learn more here)  Text page via [facility] Paging/Directory  Please see sign in/sign out for up to date coverage information    Clinically Significant Risk Factors Present on Admission             # Coagulation Defect: home medication list includes an anticoagulant medication  # Thrombocytopenia: Plts = 109 10e3/uL (Ref range: 150 - 450 10e3/uL) on admission, will monitor for bleeding      ______________________________________________________________________    Interval History   Feels well this morning and denies chest pain, SOB, fever, chills, or cough  Troponin peaked at 2.375 with follow up 1.751      Data reviewed today: I reviewed all medications, new labs and imaging results over the last 24 hours. I personally reviewed the EKG tracing showing no ischemic changes.    Physical Exam   Vital Signs: Temp: 98  F (36.7  C) Temp src: Oral BP: 136/88 Pulse: 63   Resp: 15 SpO2: 97 % O2 Device: None (Room air)    Weight: 150 lbs 0 oz  Constitutional: awake, alert,  cooperative, no apparent distress, and appears stated age  Eyes: Lids and lashes normal, pupils equal, round and reactive to light, extra ocular muscles intact, sclera clear, conjunctiva normal  Respiratory: No increased work of breathing, good air exchange, clear to auscultation bilaterally, no crackles or wheezing  Cardiovascular: regular rate and rhythm, normal S1 and S2 and no edema  GI: non-distended and non-tender  Skin: no bruising or bleeding  Musculoskeletal: no lower extremity pitting edema present  Neurologic: Masked facies. Mental Status Exam:  Level of Alertness:   awake  Orientation:   person, place, time  Neuropsychiatric: General: normal, calm and normal eye contact    Data   Recent Labs   Lab 08/20/21  0752 08/19/21  2223 08/19/21  2049 08/19/21  1753 08/19/21  1635   WBC 8.9 10.0  --   --  16.4*   HGB 13.7 13.3  --   --  14.2   MCV 92 90  --   --  92   * 113*  --   --  122*   INR 3.41* 3.34*  --   --  2.98*    136 138  --   --    POTASSIUM 3.7 3.5 3.7  --   --    CHLORIDE 106 103 104  --   --    CO2 23 28 24  --   --    BUN 30 32* 32*  --   --    CR 0.99 1.09 1.15  --   --    ANIONGAP 7 5 10  --   --    GILBERT 8.0* 8.1* 8.2*  --   --    GLC 89 188* 139*  --   --    ALBUMIN  --   --  2.7*  --   --    PROTTOTAL  --   --  7.2  --   --    BILITOTAL  --   --  1.0  --   --    ALKPHOS  --   --  153*  --   --    ALT  --   --  27  --   --    AST  --   --  59*  --   --    TROPONIN 1.751*  --  2.375* 0.660* 0.586*

## 2021-08-20 NOTE — PHARMACY-ANTICOAGULATION SERVICE
Clinical Pharmacy - Warfarin Dosing Consult     Pharmacy has been consulted to manage this patient s warfarin therapy.  Indication: Atrial Fibrillation  Therapy Goal: INR 2-3  OP Anticoag Clinic: [facility]  Warfarin Prior to Admission: Yes  Warfarin PTA Regimen: 3.75 mg every Fri; 2.5 mg all other days  Significant drug interactions: None    INR   Date Value Ref Range Status   08/19/2021 3.34 (H) 0.85 - 1.15 Final     Comment:     Effective 7/11/2021, the reference range for this assay has changed.   08/19/2021 2.98 (H) 0.85 - 1.15 Final     Comment:     Effective 7/11/2021, the reference range for this assay has changed.     Factor 2 Assay   Date Value Ref Range Status   05/01/2007 58 (L) 60 - 140 % Final     Comment:     (Note)  CALLED TO TAMARA(INTERV. RADIOLOGY)[id number],        Recommend no dose of warfarin today due to rapid increase in INR (~0.4) in several hours time.  Pharmacy will monitor Haresh Rock daily and order warfarin doses to achieve specified goal.      Please contact pharmacy as soon as possible if the warfarin needs to be held for a procedure or if the warfarin goals change.      Ronaldo Babcock, PharmD  Ascom: [phone]

## 2021-08-20 NOTE — PROGRESS NOTES
"CLINICAL NUTRITION SERVICES - ASSESSMENT NOTE     Nutrition Prescription    Recommendations:  Continue to encourage PO     Malnutrition Status:    Severe malnutrition in the context of chronic illness    Recommendations ordered by Registered Dietitian (RD):  Magic cup (290 kcal 9 g protein each) with meals.        REASON FOR ASSESSMENT  Haresh Rock is a 71 year old male assessed by dietitian for Provider Order - Malnutrition   PMH of T2DM (5.4% 5/25/21) diet-controlled, CKD, DVT, Afib ablatio, antiphsopholipid antibody syndrome, Parkinson's disease and Hodgkin's lymphoma. Admitted with elevated troponin and pneumonia.       NUTRITION HISTORY  Pt reports chronically reduced appetite and dysphagia with subsequent weight loss over time. He reports having a swallow study in the past, but is not on any particular diets and not on thickened liquids. He self-limits to soft, moist foods. He has to push himself to eat 2-3 meals per day (eg breakfast sandwiches or frozen pre-made jerel pasta dinners) and snacks between meals (eg yogurt, pudding, cookies). He used to drink ensure in the evening, but feels it caused him constipation. He does not routinely take chewable multivitamin because it is difficult to swallow.     Discussed ideas for high calorie intake of dysphagia appropriate foods. Encouraged small frequent meal patterns to maximize PO in setting of reduced appetite. Answered all pt's questions/concerns.     CURRENT NUTRITION ORDERS  Diet: Minced & moist, with mildly thick liquids   Intake: Pt took a few bites of pasta with meat sauce. He is not feeling very hungry. This is the first time he's had a dysphagia diet and he doesn't mind thickened liquids.     LABS  Labs reviewed  Na 136, Cr 0.99, euglycemia     MEDICATIONS  Medications reviewed  Azithromycin, ceftriaxone, senna-docusate, simvastatin     ANTHROPOMETRICS  Height: 182.9 cm (6' 0\")  Most Recent Weight: 68 kg (150 lb)    IBW: 80.9 kg  BMI: Normal " BMI  Weight History:  Based on records, weight has been fairly stable over the past 3 months.  Pt has lost ~60 lbs over the course of the past two years (which does not meet criteria for malnutrition).   Wt Readings from Last 30 Encounters:   08/19/21 68 kg (150 lb)   08/17/21 69.9 kg (154 lb)   08/04/21 69.9 kg (154 lb 3.2 oz)   08/03/21 68.8 kg (151 lb 9.6 oz)   07/27/21 68 kg (150 lb)   07/19/21 68 kg (150 lb)   07/05/21 65.8 kg (145 lb)   06/10/21 68.5 kg (151 lb)   06/08/21 68.5 kg (151 lb)   05/25/21 68.9 kg (152 lb)   04/14/21 70.1 kg (154 lb 8.7 oz)   03/17/21 71.5 kg (157 lb 10.1 oz)   02/16/21 72.2 kg (159 lb 3.2 oz)   10/07/20 79.4 kg (175 lb)   09/11/20 81.6 kg (180 lb)   09/04/20 82.6 kg (182 lb)   08/31/20 80.7 kg (178 lb)   07/13/20 85.3 kg (188 lb)   02/18/20 93.1 kg (205 lb 4.8 oz)   08/20/19 94 kg (207 lb 3.7 oz)   05/21/19 94.3 kg (208 lb)   05/13/19 93.9 kg (207 lb)   04/23/19 93.4 kg (206 lb)   01/28/19 93.4 kg (206 lb)   11/20/18 93.6 kg (206 lb 4.8 oz)   10/22/18 93.4 kg (206 lb)   09/18/18 93 kg (205 lb)   09/14/18 93 kg (205 lb)   07/03/18 94.2 kg (207 lb 9.6 oz)   05/22/18 95 kg (209 lb 6.4 oz)         Dosing Weight: 68 kg (actual on 8/19))    ASSESSED NUTRITION NEEDS  Estimated Energy Needs: 1700 - 2050+ kcals/day (25 - 30+ kcals/kg)  Justification: Maintenance  Estimated Protein Needs: 80 - 100 grams protein/day (1.2 - 1.5 grams of pro/kg)  Justification: Increased needs  Estimated Fluid Needs: (1 mL/kcal)   Justification: Maintenance    PHYSICAL FINDINGS  See malnutrition section below.    MALNUTRITION  % Intake: Decreased intake does not meet criteria  % Weight Loss: Weight loss does not meet criteria  Subcutaneous Fat Loss: Facial region: Severe, Upper arm: Severe, Lower arm: Severe and Thoracic/intercostal: Severe  Muscle Loss: Temporal: Severe, Facial & jaw region: Severe, Scapular bone: Severe, Thoracic region (clavicle, acromium bone, deltoid, trapezius, pectoral): Severe, Upper  arm (bicep, tricep): Severe, Lower arm  (forearm): Severe, Upper leg (quadricep, hamstring): Severe, Patellar region: Severe and Posterior calf: Severe  Fluid Accumulation/Edema: None noted  Malnutrition Diagnosis: Severe malnutrition in the context of chronic illness    NUTRITION DIAGNOSIS  Inadequate oral intake related to reduced appetite and dysphagia as evidenced by pt report, observed fat & muscle loss.      INTERVENTIONS  See interventions at top of progress note.     Goals  Patient to consume % of nutritionally adequate meal trays TID, or the equivalent with supplements/snacks.     Monitoring/Evaluation  Progress toward goals will be monitored and evaluated per protocol.  vEie Jose RD, LD  5A RD Pager: [phone]  Weekend Pager: [phone]

## 2021-08-20 NOTE — PROGRESS NOTES
08/20/21 0927   General Information   Onset of Illness/Injury or Date of Surgery 08/19/21   Referring Physician Mark Bhakta, DEJAH CNP   Patient/Family Therapy Goal Statement (SLP) None stated   Pertinent History of Current Problem Haresh Rock is a 71 year old man admitted on 8/19/2021. He has a history of diet-controlled diabetes type 2, CKD, DVT, A. fib ablation, antiphospholipid antibody syndrome, Parkinson's disease and Hodgkin's lymphoma admitted with elevated troponin and pneumonia.  He is s/p TURP one week prior. Pt is known to OP SLP caseload, please see dysphagia hx for further information. Pt has not been modifying his diet at home. Currently on regular diet with thin liquids. Clinical swallow eval completed per MD orders to further assess oropharyngeal swallow function.    Past History of Dysphagia Pt known to OP SLP caseload and has been followed at [facility]. Per most recent VFSS completed 3/23/21, pt with silent aspiration during and after the swallow with thin and nectar thick liquids. A supraglottic swallow strategy was not effective at fully clearing aspirated residuals with thin liquids, however pt was able to mostly clear aspirated residuals with supraglottic swallow with nectar thick liquids. Pt also with moderate-severe pharyngeal residuals after the swallow. Dysphagia diet 2 and thin liquids with supraglottic swallow recommended at that time. Pt reports no diet modifications at home, but was able to recall supraglottic swallow strategy. However, pt not consistently using strategy upon SLP arrival with regular/thin meal tray present. Per radiology report from VFSS completed 3/23/21, a chin tuck strategy eliminated aspiration with nectar thick liquids, however this was not documented in speech therapy report.    Type of Evaluation   Type of Evaluation Swallow Evaluation   Oral Motor   Oral Musculature   (generalized weakness)   Structural Abnormalities none present    Mucosal Quality adequate   Dentition (Oral Motor)   Dentition (Oral Motor) adequate dentition   Facial Symmetry (Oral Motor)   Facial Symmetry (Oral Motor) WNL   Lip Function (Oral Motor)   Lip Range of Motion (Oral Motor) WNL   Tongue Function (Oral Motor)   Tongue ROM (Oral Motor) not tested   Jaw Function (Oral Motor)   Jaw Function (Oral Motor) WNL   Cough/Swallow/Gag Reflex (Oral Motor)   Volitional Throat Clear/Cough (Oral Motor) WNL   Vocal Quality/Secretion Management (Oral Motor)   Vocal Quality (Oral Motor)   (reduced volume)   Secretion Management (Oral Motor) WNL   General Swallowing Observations   Current Diet/Method of Nutritional Intake (General Swallowing Observations, NIS) thin liquids (level 0);regular diet   Respiratory Support (General Swallowing Observations) none   Swallowing Evaluation Clinical swallow evaluation   Clinical Swallow Evaluation   Feeding Assistance minimal assistance required   Clinical Swallow Evaluation Textures Trialed thin liquids;mildly thick liquids;pureed;solid foods   Clinical Swallow Eval: Thin Liquid Texture Trial   Mode of Presentation, Thin Liquids cup;self-fed   Volume of Liquid or Food Presented 3 oz   Oral Phase of Swallow Premature pharyngeal entry   Pharyngeal Phase of Swallow impaired;coughing/choking   Diagnostic Statement Thin liquids via cup resulted in overt s/sx of aspiration marked by coughing across all trials.    Clinical Swallow Eval: Mildly Thick Liquids   Mode of Presentation cup;self-fed   Volume Presented 4 oz   Oral Phase WFL   Pharyngeal Phase intact   Diagnostic Statement No overt s/sx of aspiration. Cued pt to utilize supraglottic swallow strategy.    Clinical Swallow Evaluation: Puree Solid Texture Trial   Mode of Presentation, Puree spoon;self-fed   Volume of Puree Presented 5 tbsp   Oral Phase, Puree WFL   Pharyngeal Phase, Puree intact   Diagnostic Statement No overt s/sx of aspiration.    Clinical Swallow Evaluation: Solid Food Texture  Trial   Mode of Presentation self-fed   Volume Presented x1 tbsp omelette    Oral Phase other (see comments)  (prolonged time for mastication)   Pharyngeal Phase intact   Diagnostic Statement No overt s/sx of aspiration. Pt required prolonged but functional time for mastication. Known hx of severe pharyngeal residuals based on VFSS completed 3/23/21.    Swallowing Recommendations   Diet Consistency Recommendations mildly thick liquids (level 2);minced & moist (level 5)   Supervision Level for Intake close supervision needed   Mode of Delivery Recommendations bolus size, small;food moistened;slow rate of intake   Swallowing Maneuver Recommendations alternate food and liquid intake;effortful (hard) swallow;extra swallow;supraglottic swallow   Monitoring/Assistance Required (Eating/Swallowing) stop eating activities when fatigue is present;monitor for cough or change in vocal quality with intake;optimize oral intake to minimize need for tube feeding   Recommended Feeding/Eating Techniques (Swallow Eval) maintain upright sitting position for eating;maintain upright posture during/after eating for 30 minutes;minimize distractions during oral intake;provide oral hygiene prior to intake;provide 6 smaller meals throughout day;set-up and prepare tray   Medication Administration Recommendations, Swallowing (SLP) consider serving PO meds with mildly thick liquids or in small amount of puree   Instrumental Assessment Recommendations VFSS (videofluroscopic swallowing study)   General Therapy Interventions   Planned Therapy Interventions Dysphagia Treatment   Dysphagia treatment Compensatory strategies for swallowing;Modified diet education;Oropharyngeal exercise training;Instruction of safe swallow strategies   SLP Therapy Assessment/Plan   Criteria for Skilled Therapeutic Interventions Met (SLP Eval) yes;treatment indicated   SLP Diagnosis moderate oropharyngeal dysphagia    Rehab Potential (SLP Eval) fair, will monitor  progress closely   Therapy Frequency (SLP Eval) 5 times/wk   Predicted Duration of Therapy Intervention (SLP Eval) 2 weeks   Comment, Therapy Assessment/Plan (SLP) Clinical swallow eval completed per MD orders. Pt presents with moderate acute on chronic oropharyngeal dysphagia in the setting of Parkinson's. Oral mech exam remarkable for reduced speech/coordination of oral motor structures and reduced vocal intensity. Pt with known hx of silent aspiration with thin and mildly thick liquids on previous VFSS completed 3/23/21, however pt was able to clear most aspirated mildly thick liquids with use of supraglottic swallow strategy. Pt also with severe pharyngeal residuals per VFSS note. Today, thin liquids via cup resulted in consistent overt s/sx of aspiration marked by coughing. Pt tolerated mildly thick liquids via cup, pureed textures, and regular solid textures with no overt s/sx of aspiration. Pt required cues to use supraglottic swallow strategy with mildly thick liquids. Pt is at risk for silent aspiration before and after the swallow based on previous VFSS results. Recommend pt cautiously initiate minced and moist diet (5) with mildly thick liquids (2) and strict use of supraglottic swallow strategy (swallow, cough, swallow) with PO intake. Pt should be fully upright and alert for all PO, complete thorough oral cares prior to PO intake, alternate between consistencies, and complete 2-3 extra swallows per bite. Recommend completion of repeat video swallow study to further assess aspiration risk given hx of silent aspiration. ST to continue to follow. Pt would benefit from ongoing ST follow up at discharge targeting dysphagia and voice therapy.    Therapy Plan Review/Discharge Plan (SLP)   Therapy Plan Review (SLP) evaluation/treatment results reviewed;care plan/treatment goals reviewed;risks/benefits reviewed;current/potential barriers reviewed;participants voiced agreement with care plan;participants  included;patient   Demonstrates Need for Referral to Another Service (SLP) clinical nutrition services/dietitian;neuropsychology services;occupational therapist;physical therapist;respiratory therapist   SLP Discharge Planning    SLP Discharge Recommendation (DC Rec) home with outpatient speech therapy   SLP Rationale for DC Rec pt would benefit from ongoing ST follow up targeting dysphagia and voice therapy.    SLP Brief overview of current status  Recommend pt cautiously initiate minced and moist diet (5) with mildly thick liquids (2) and strict use of supraglottic swallow strategy (swallow, cough, swallow) with PO intake. Pt should be fully upright and alert for all PO, complete thorough oral cares prior to PO intake, alternate between consistencies, and complete 2-3 extra swallows per bite. Recommend completion of repeat video swallow study to further assess aspiration risk given hx of silent aspiration.    Total Evaluation Time   Total Evaluation Time (Minutes) 23

## 2021-08-21 ENCOUNTER — MYC REFILL (OUTPATIENT)
Dept: FAMILY MEDICINE | Facility: CLINIC | Age: 71
End: 2021-08-21

## 2021-08-21 VITALS
OXYGEN SATURATION: 97 % | TEMPERATURE: 97.4 F | WEIGHT: 150 LBS | HEART RATE: 68 BPM | DIASTOLIC BLOOD PRESSURE: 69 MMHG | RESPIRATION RATE: 18 BRPM | HEIGHT: 72 IN | SYSTOLIC BLOOD PRESSURE: 127 MMHG | BODY MASS INDEX: 20.32 KG/M2

## 2021-08-21 DIAGNOSIS — D68.61 ANTIPHOSPHOLIPID ANTIBODY SYNDROME (H): ICD-10-CM

## 2021-08-21 DIAGNOSIS — I50.21 ACUTE SYSTOLIC HEART FAILURE (H): Primary | ICD-10-CM

## 2021-08-21 LAB
CHOLEST SERPL-MCNC: 102 MG/DL
HDLC SERPL-MCNC: 40 MG/DL
INR PPP: 2.97 (ref 0.85–1.15)
LDLC SERPL CALC-MCNC: 50 MG/DL
NONHDLC SERPL-MCNC: 62 MG/DL
TRIGL SERPL-MCNC: 61 MG/DL

## 2021-08-21 PROCEDURE — 250N000013 HC RX MED GY IP 250 OP 250 PS 637: Performed by: NURSE PRACTITIONER

## 2021-08-21 PROCEDURE — 36415 COLL VENOUS BLD VENIPUNCTURE: CPT | Performed by: NURSE PRACTITIONER

## 2021-08-21 PROCEDURE — 99239 HOSP IP/OBS DSCHRG MGMT >30: CPT | Performed by: STUDENT IN AN ORGANIZED HEALTH CARE EDUCATION/TRAINING PROGRAM

## 2021-08-21 PROCEDURE — 85610 PROTHROMBIN TIME: CPT | Performed by: NURSE PRACTITIONER

## 2021-08-21 PROCEDURE — 250N000013 HC RX MED GY IP 250 OP 250 PS 637: Performed by: STUDENT IN AN ORGANIZED HEALTH CARE EDUCATION/TRAINING PROGRAM

## 2021-08-21 RX ORDER — AZITHROMYCIN 250 MG/1
250 TABLET, FILM COATED ORAL DAILY
Qty: 2 TABLET | Refills: 0 | Status: SHIPPED | OUTPATIENT
Start: 2021-08-21 | End: 2021-08-21

## 2021-08-21 RX ORDER — ROSUVASTATIN CALCIUM 40 MG/1
40 TABLET, COATED ORAL DAILY
Qty: 30 TABLET | Refills: 0 | Status: SHIPPED | OUTPATIENT
Start: 2021-08-21 | End: 2021-08-23

## 2021-08-21 RX ORDER — AZITHROMYCIN 250 MG/1
250 TABLET, FILM COATED ORAL DAILY
Qty: 2 TABLET | Refills: 0 | Status: SHIPPED | OUTPATIENT
Start: 2021-08-21 | End: 2021-09-15

## 2021-08-21 RX ORDER — ROSUVASTATIN CALCIUM 40 MG/1
40 TABLET, COATED ORAL DAILY
Qty: 30 TABLET | Refills: 0 | Status: SHIPPED | OUTPATIENT
Start: 2021-08-21 | End: 2021-09-20

## 2021-08-21 RX ORDER — DAPAGLIFLOZIN 5 MG/1
5 TABLET, FILM COATED ORAL DAILY
Qty: 30 TABLET | Refills: 0 | Status: SHIPPED | OUTPATIENT
Start: 2021-08-21 | End: 2021-09-13

## 2021-08-21 RX ORDER — SACUBITRIL AND VALSARTAN 24; 26 MG/1; MG/1
1 TABLET, FILM COATED ORAL 2 TIMES DAILY
Qty: 60 TABLET | Refills: 0 | Status: SHIPPED | OUTPATIENT
Start: 2021-08-21 | End: 2021-08-23

## 2021-08-21 RX ORDER — ROSUVASTATIN CALCIUM 40 MG/1
40 TABLET, COATED ORAL DAILY
Qty: 30 TABLET | Refills: 0 | Status: SHIPPED | OUTPATIENT
Start: 2021-08-21 | End: 2021-08-21

## 2021-08-21 RX ADMIN — EMPAGLIFLOZIN 10 MG: 10 TABLET, FILM COATED ORAL at 08:26

## 2021-08-21 RX ADMIN — ROSUVASTATIN CALCIUM 40 MG: 10 TABLET, FILM COATED ORAL at 08:25

## 2021-08-21 RX ADMIN — AMOXICILLIN AND CLAVULANATE POTASSIUM 1 TABLET: 875; 125 TABLET, FILM COATED ORAL at 08:42

## 2021-08-21 RX ADMIN — SACUBITRIL AND VALSARTAN 1 TABLET: 24; 26 TABLET, FILM COATED ORAL at 08:25

## 2021-08-21 RX ADMIN — AZITHROMYCIN MONOHYDRATE 250 MG: 250 TABLET ORAL at 08:26

## 2021-08-21 RX ADMIN — ACETAMINOPHEN 975 MG: 325 TABLET, FILM COATED ORAL at 08:26

## 2021-08-21 RX ADMIN — DOCUSATE SODIUM 50 MG AND SENNOSIDES 8.6 MG 2 TABLET: 8.6; 5 TABLET, FILM COATED ORAL at 08:26

## 2021-08-21 RX ADMIN — FLECAINIDE ACETATE 50 MG: 50 TABLET ORAL at 08:26

## 2021-08-21 ASSESSMENT — ACTIVITIES OF DAILY LIVING (ADL)
ADLS_ACUITY_SCORE: 13
ADLS_ACUITY_SCORE: 13
ADLS_ACUITY_SCORE: 17

## 2021-08-21 NOTE — PLAN OF CARE
Pt A&O. VSS on RA. Afebrile. Up ind. Denies pain. Tele-SR. Blanchable redness on bottom. On minced & moist diet, mildly thick liquids. Voiding. Last BM 8/18. Slept most of the night. Possible discharge today once antibiotic plan established.

## 2021-08-21 NOTE — DISCHARGE SUMMARY
[facility]  Hospitalist Discharge Summary      Date of Admission:  8/19/2021  Date of Discharge:  8/21/2021  Discharging Provider: Deidra Jean-Baptiste MD  Discharge Team: Hospitalist Service, Gold 8    Discharge Diagnoses   Sepsis 2/2 Aspiration Pneumonia  Dysphagia 2/2 Parkinson's disease  Severe malnutrition  Myocardial Demand Ischemia 2/2 Sepsis  Newly reduced EF  JOANNA      Follow-ups Needed After Discharge   Follow-up Appointments     Adult [facility] Follow-up and recommended labs and tests      Follow up with primary care provider, Anya Nowak, within 7 days for   hospital follow- up.  No follow up labs or test are needed.    Follow up with General Cardiology within 1 month  for hospital follow- up.   The following labs/tests are recommended: Cardiac MRI.    Appointments on [place] and/or [facility] (with [facility] or [facility]   provider or service). Call [phone] if you haven't heard regarding   these appointments within 7 days of discharge.            - Follow up for Video Swallow Study and further speech therapy   - Cardiology follow up and stress cardiac MRI     Unresulted Labs Ordered in the Past 30 Days of this Admission     Date and Time Order Name Status Description    8/19/2021 10:52 AM AFB CULTURE AND STAIN NON BLOOD Preliminary       These results will be followed up by Infectious Diseases (Dr. Jacques ordering provider)    Discharge Disposition   Discharged to home  Condition at discharge: Stable    Hospital Course            Haresh Rock is a 71 year old man admitted on 8/19/2021. He has a history of diet-controlled diabetes type 2, CKD, DVT, A. fib ablation (on warfarin, flecanide, metop), antiphospholipid antibody syndrome, Parkinson's disease (hx of dysphagia) and Hodgkin's lymphoma s/p BMT in 2007 who presented with pneumonia and elevated troponin     # CAP, Suspected aspiration pneumonia  # Dysphagia  - Initially treated with Zosyn -->  ceftriaxone/azithro --> augmentin/azithromycin on discharge to complete 5 days abx  -Speech evaluated: recommend minced and moist diet with mildly thick liquids. Needs VFSS but this can be done as outpatient  -- COVID negative  - Sputum cx 8/19 growing mixed maury     # Severe malnutrition - The patient has temporal wasting and significant weight loss over the course of the year likely secondary to dysphagia and his Parkinson's.  -- Evaluated by nutrition, discussed with patient and son     # Myocardial Demand Ischemia 2/2 Sepsis  # Newly reduced EF with anterior wall akinesis  The patient had an episode of chest pain on admission associated with deep breathing and hemoptysis. Troponins peaked at 2.375 and trended down appropriately. No ischemic changes on ECG. Loaded with ASA, already therapeutic on warfarin.   - Cardiology consulted, no indication for ACS protocol  - Echo had new EF 45-50% with inferior wall akinesis  - Simvastatin changed to rosuvastatin 40mg  - Continue home toprol XL 25mg  - Start entresto 24/26  - Start empagliflozin 10mg (dapagliflozin 5mg started on discharge due to insurance)  - Continue daily aspirin  - Ordered outpatient cardiac stress MR to look for ischemia vs stress cardiomyopathy     # JOANNA: resolved with fluids    # History of diabetes type 2  -Diet controlled    # History of DVT, antiphospholipid antibody syndrome  -Continue home Coumadin, pharmacy to dose    # Afib   -- Continue warfarin   -- Continue home flecainide    #  History of Parkinson's  # L foot drop  - Not currently on carbidopa-levodopa  - Following with outpatient neurology     # Hematuria  # Pyuria/Bacteruria  - Recent TURP. Saw Urology 8/17.  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       Deidra Jean-Baptiste MD  [facility] [address] [address]  Phone: [phone]  ______________________________________________________________________    Physical Exam   Vital Signs: Temp: 97.4  F (36.3  C) Temp src: Oral BP: 127/69 Pulse: 68   Resp: 18 SpO2: 97 % O2 Device: None (Room air)    Weight: 150 lbs 0 oz  Constitutional: awake, alert, cooperative, no apparent distress, and appears stated age  Eyes: Lids and lashes normal, pupils equal, round and reactive to light, extra ocular muscles intact, sclera clear, conjunctiva normal  Respiratory: No increased work of breathing, good air exchange, clear to auscultation bilaterally, no crackles or wheezing  Cardiovascular: regular rate and rhythm, normal S1 and S2 and no edema  GI: non-distended and non-tender  Skin: no bruising or bleeding  Musculoskeletal: no lower extremity pitting edema present  Neurologic: Masked facies. Mental Status Exam:  Level of Alertness:   awake  Orientation:   person, place, time  Neuropsychiatric: General: normal, calm and normal eye contact       Primary Care Physician   Anya Nowak    Discharge Orders      MRI Cardiac w/contrast and stress     Adult Cardiology Eval Referral      Reason for your hospital stay    Aspiration pneumonia     Activity    Your activity upon discharge: activity as tolerated     Adult [facility] Follow-up and recommended labs and tests    Follow up with primary care provider, Anya Nowak, within 7 days for hospital follow- up.  No follow up labs or test are needed.    Follow up with General Cardiology within  1 month  for hospital follow- up. The following labs/tests are recommended: Cardiac MRI.    Appointments on [place] and/or [facility] (with [facility] or [facility] provider or service). Call [phone] if you haven't heard regarding these appointments within 7 days of discharge.     Diet    Follow this diet upon discharge: Orders Placed This Encounter      Snacks/Supplements Adult: Magic Cup; With Meals      Minced & Moist Diet (level 5) Mildly Thick (level 2)       Significant Results and Procedures   Results for orders placed or performed during the hospital encounter of 21   Chest XR,  PA & LAT    Narrative    EXAM: XR CHEST 2 VW  2021 5:46 PM     HISTORY:  Cough, fever       COMPARISON:  Chest x-ray 2021    FINDINGS:   PA and lateral views of the chest. The trachea is midline. Chronic  silhouette is within normal limits. Asymmetric elevation of the right  hemidiaphragm with blunting of the right costophrenic angle. Patchy  airspace opacities in the bilateral lower lung zones, right greater  than left. No pleural effusion or pneumothorax. Osseous structures are  within normal limits.      Impression    IMPRESSION:   Airspace opacities in the bilateral lower lung zones, suspicious for  infection.    I have personally reviewed the examination and initial interpretation  and I agree with the findings.    NAOMI WILLIAM MD         SYSTEM ID:  [id number]   Echocardiogram Complete     Value    LVEF  45-50%    Narrative    [id number]  [id number]  [id number]  [id number]^JEFF^ROGE^RASHAWN     [facility]  Echocardiography Laboratory  [address] [place] [id number]     Name: PAMELA GRANADOS  MRN: [id number]  : 1950  Study Date: 2021 09:33 AM  Age: 71 yrs  Gender: Male  Patient Location: [facility]  Reason For Study: Ischemic Heart Disease  Ordering Physician: ROGE AGUILAR  Performed By: Suzanne Laureano RDCS     BSA: 1.9 m2  Height: 72 in  Weight: 150  lb  HR: 61  BP: 111/65 mmHg  ______________________________________________________________________________  Procedure  Complete Portable Echo Adult. Contrast Optison. Technically difficult  study.Extremely poor acoustic windows. Patient was given 5 ml mixture of 3 ml  Optison and 6 ml saline. 4 ml wasted.  ______________________________________________________________________________  Interpretation Summary  Technically difficult study.Extremely poor acoustic windows.  The visual ejection fraction is 45-50%.  Right ventricular function, chamber size, wall motion, and thickness are  normal.  The inferior vena cava is normal.  No pericardial effusion is present.  ______________________________________________________________________________  Left Ventricle  Left ventricular wall thickness cannot evaluate. Left ventricular size is  normal. The visual ejection fraction is 45-50%. Left ventricular diastolic  function is indeterminate. Inferior wall akinesis is present.     Right Ventricle  Right ventricular function, chamber size, wall motion, and thickness are  normal.     Atria  Both atria appear normal.     Mitral Valve  The valve leaflets are not well visualized. On Doppler interrogation, there is  no significant stenosis or regurgitation.     Aortic Valve  Mild aortic valve calcification is present. The valve leaflets are not well  visualized. On Doppler interrogation, there is no significant stenosis or  regurgitation.     Tricuspid Valve  The valve leaflets are not well visualized. On Doppler interrogation, there is  no significant stenosis or regurgitation. The right ventricular systolic  pressure is approximated at 18.1 mmHg plus the right atrial pressure.     Pulmonic Valve  The pulmonic valve cannot be assessed.     Vessels  The thoracic aorta cannot be assessed. The pulmonary artery cannot be  assessed. The inferior vena cava is normal.     Pericardium  No pericardial effusion is present.      ______________________________________________________________________________  MMode/2D Measurements & Calculations  IVSd: 1.9 cm  LVIDd: 4.2 cm  LVIDs: 3.7 cm  LVPWd: 1.3 cm  FS: 13.2 %     LV mass(C)d: 272.0 grams  LV mass(C)dI: 144.3 grams/m2  Ao root diam: 3.0 cm  asc Aorta Diam: 2.7 cm  LVOT diam: 2.0 cm  LVOT area: 3.1 cm2  LA Volume (BP): 40.1 ml  LA Volume Index (BP): 21.2 ml/m2  RWT: 0.59     Doppler Measurements & Calculations  MV E max renato: 60.2 cm/sec  MV A max renato: 39.2 cm/sec  MV E/A: 1.5     MV dec slope: 422.0 cm/sec2  MV dec time: 0.14 sec  TR max renato: 213.0 cm/sec  TR max P.1 mmHg  E/E' av.5  Lateral E/e': 8.6  Medial E/e': 8.4     ______________________________________________________________________________  Report approved by: Del Martin 2021 11:00 AM           *Note: Due to a large number of results and/or encounters for the requested time period, some results have not been displayed. A complete set of results can be found in Results Review.       Discharge Medications   Discharge Medication List as of 2021  9:44 AM      START taking these medications    Details   dapagliflozin (FARXIGA) 5 MG TABS tablet Take 1 tablet (5 mg) by mouth daily, 5 mg, Oral, DAILY Starting Sat 2021, Disp-30 tablet, R-0, E-Prescribe         CONTINUE these medications which have CHANGED    Details   amoxicillin-clavulanate (AUGMENTIN) 875-125 MG tablet Take 1 tablet by mouth every 12 hours, Disp-4 tablet, R-0, E-Prescribe      azithromycin (ZITHROMAX) 250 MG tablet Take 1 tablet (250 mg) by mouth daily, Disp-2 tablet, R-0, E-Prescribe      rosuvastatin (CRESTOR) 40 MG tablet Take 1 tablet (40 mg) by mouth daily, Disp-30 tablet, R-0, E-Prescribe      sacubitril-valsartan (ENTRESTO) 24-26 MG per tablet Take 1 tablet by mouth 2 times daily, Disp-60 tablet, R-0, E-Prescribe         CONTINUE these medications which have NOT CHANGED    Details   acetaminophen (TYLENOL) 500 MG tablet 500mg  tab as needed, Historical      bisacodyl (DULCOLAX) 5 MG EC tablet [facility] Disp-4 tablet, R-0, E-Prescribe      calcium carbonate (TUMS) 500 MG chewable tablet 1 tab by mouth in morning or at night as needed, Historical      dutasteride (AVODART) 0.5 MG capsule Take 1 capsule (0.5 mg) by mouth daily, Disp-90 capsule, R-3, E-Prescribe      flecainide (TAMBOCOR) 50 MG tablet Take 1 tablet (50 mg) by mouth 2 times daily, Disp-180 tablet, R-2, E-Prescribe      fluticasone (FLONASE) 50 MCG/ACT nasal spray Spray 1 spray into both nostrils daily as needed for rhinitis, Disp-16 g, R-11, E-Prescribe      loratadine (CLARITIN) 10 MG tablet Take 10 mg by mouth daily as needed , Historical      metoprolol succinate ER (TOPROL XL) 25 MG 24 hr tablet Take 1 tablet (25 mg) by mouth daily In the evening, Disp-90 tablet, R-3, E-Prescribe      Nutritional Supplements (ENSURE PO) Historical      polyethylene glycol (MIRALAX) 17 g packet Take 1 packet by mouth daily as needed for constipation, Historical      PREVIDENT 5000 DRY MOUTH 1.1 % GEL topical gel Take by mouth daily, R-3, KRIS, Historical      senna-docusate (SENOKOT-S/PERICOLACE) 8.6-50 MG tablet Take 2 tablets by mouth daily, Historical      vitamin D3 (CHOLECALCIFEROL) 50 mcg (2000 units) tablet Take 1 tablet (50 mcg) by mouth daily, Historical      warfarin ANTICOAGULANT (COUMADIN) 2.5 MG tablet TAKE 1.25 MG (1/2 TABLET) MON/FRI AND 2.5 MG (1 TABLET) ALL OTHER DAYS. NEED APPT., Disp-90 tablet, R-0, E-Prescribe      amoxicillin (AMOXIL) 500 MG capsule 4 x 500mg tabs by mouth 1 hour before dental appointments., Historical      carbidopa-levodopa (SINEMET)  MG tablet Start with 1/2 tab daily x 3days then 1/2 tab twice daily for 3-7 days then 1 tab twice daily for one week then 1 tablet 3 times per day. The dose can be increased if there is no significant benefit. IT may take 1-3 weeks before benefit is seen  To lesse n the effects that  protein has on the effectiveness of sinemet (i.e.. Levodopa), it is best to take this medication on an empty stomach one hour before or two hours after meals.     If you are experiencing nausea with the medication, then you should take  the medication with a CRACKER, GINGER TABLETS OR WITH FOOD. CALL IF STILL HAVING NAUSEA AS WE CAN TRY EXTRA CARBIDOPA, TRIMETHOBENZAMIDE ETC.     Besides nausea there are other side effects including the rare occurrence of a rash to the yellow dye in th e sinemet tablets if you are taking the 25/100 formulation.     Iron may interfere with the effectiveness of this medication so do not take iron supplements at the same time you are taking sinemet., Disp-90 tablet, R-11, Local Print      docusate sodium (COLACE) 50 MG capsule Take 1 capsule (50 mg) by mouth 2 times daily as needed for constipation, Disp-30 capsule, R-0, Local Print      multivitamin (CENTRUM SILVER) tablet Take 1 tablet by mouth daily, Historical         STOP taking these medications       cephALEXin (KEFLEX) 750 MG capsule Comments:   Reason for Stopping:         empagliflozin (JARDIANCE) 10 MG TABS tablet Comments:   Reason for Stopping:         simvastatin (ZOCOR) 10 MG tablet Comments:   Reason for Stopping:             Allergies   Allergies   Allergen Reactions     Seasonal Allergies

## 2021-08-21 NOTE — PLAN OF CARE
Admission/Transfer from: ED  2 RN skin assessment completed. Yes, assessed with Betzy KELSEY RN  Significant findings include: No notable or concerning findings on pt skin other than light scattered bruising.  WOC Nurse Consult Ordered? No.

## 2021-08-21 NOTE — PLAN OF CARE
Speech Language Therapy Discharge Summary    Reason for therapy discharge:    Discharged to home with outpatient therapy.    Progress towards therapy goal(s). See goals on Care Plan in [facility] electronic health record for goal details.  Goals not met.  Barriers to achieving goals:   discharge from facility.    Therapy recommendation(s):    Continued therapy is recommended.  Rationale/Recommendations:  Continued ST for dysphagia. Recommend completion of VFSS to re-assess aspiration risk in the setting of known silent aspiration.    Recommend pt cautiously initiate minced and moist diet (5) with mildly thick liquids (2) and strict use of supraglottic swallow strategy (swallow, cough, swallow) with PO intake. Pt should be fully upright and alert for all PO, complete thorough oral cares prior to PO intake, alternate between consistencies, and complete 2-3 extra swallows per bite. Recommend completion of repeat video swallow study to further assess aspiration risk given hx of silent aspiration.

## 2021-08-21 NOTE — PLAN OF CARE
Pt A&O, VSS with HTN on RA.  SBA to the bathroom.  PIV SL.  On tele, hx of afib.  Mildy thickened liquids and minced & moist diet. Sit fully upright when taken meds, encourage coughing. Trending troponin level.  Calls appropriately.

## 2021-08-22 LAB
ATRIAL RATE - MUSE: 64 BPM
ATRIAL RATE - MUSE: 64 BPM
ATRIAL RATE - MUSE: 66 BPM
DIASTOLIC BLOOD PRESSURE - MUSE: NORMAL MMHG
INTERPRETATION ECG - MUSE: NORMAL
P AXIS - MUSE: 52 DEGREES
P AXIS - MUSE: 60 DEGREES
P AXIS - MUSE: 94 DEGREES
PR INTERVAL - MUSE: 178 MS
PR INTERVAL - MUSE: 180 MS
PR INTERVAL - MUSE: 188 MS
QRS DURATION - MUSE: 80 MS
QRS DURATION - MUSE: 80 MS
QRS DURATION - MUSE: 84 MS
QT - MUSE: 422 MS
QT - MUSE: 428 MS
QT - MUSE: 456 MS
QTC - MUSE: 435 MS
QTC - MUSE: 441 MS
QTC - MUSE: 478 MS
R AXIS - MUSE: -12 DEGREES
R AXIS - MUSE: -13 DEGREES
R AXIS - MUSE: 27 DEGREES
SYSTOLIC BLOOD PRESSURE - MUSE: NORMAL MMHG
T AXIS - MUSE: 29 DEGREES
T AXIS - MUSE: 39 DEGREES
T AXIS - MUSE: 46 DEGREES
VENTRICULAR RATE- MUSE: 64 BPM
VENTRICULAR RATE- MUSE: 64 BPM
VENTRICULAR RATE- MUSE: 66 BPM

## 2021-08-23 ENCOUNTER — TELEPHONE (OUTPATIENT)
Dept: FAMILY MEDICINE | Facility: CLINIC | Age: 71
End: 2021-08-23

## 2021-08-23 ENCOUNTER — ANTICOAGULATION THERAPY VISIT (OUTPATIENT)
Dept: FAMILY MEDICINE | Facility: CLINIC | Age: 71
End: 2021-08-23

## 2021-08-23 ENCOUNTER — TRANSFERRED RECORDS (OUTPATIENT)
Dept: HEALTH INFORMATION MANAGEMENT | Facility: CLINIC | Age: 71
End: 2021-08-23

## 2021-08-23 ENCOUNTER — NURSE TRIAGE (OUTPATIENT)
Dept: FAMILY MEDICINE | Facility: CLINIC | Age: 71
End: 2021-08-23

## 2021-08-23 ENCOUNTER — PATIENT OUTREACH (OUTPATIENT)
Dept: CARE COORDINATION | Facility: CLINIC | Age: 71
End: 2021-08-23

## 2021-08-23 DIAGNOSIS — I48.91 ATRIAL FIBRILLATION (H): ICD-10-CM

## 2021-08-23 DIAGNOSIS — I48.0 PAROXYSMAL ATRIAL FIBRILLATION (H): ICD-10-CM

## 2021-08-23 DIAGNOSIS — Z79.01 LONG TERM CURRENT USE OF ANTICOAGULANT THERAPY: Primary | ICD-10-CM

## 2021-08-23 DIAGNOSIS — Z71.89 OTHER SPECIFIED COUNSELING: ICD-10-CM

## 2021-08-23 LAB — INR (EXTERNAL): 2.6 (ref 0.9–1.1)

## 2021-08-23 RX ORDER — WARFARIN SODIUM 2.5 MG/1
TABLET ORAL
Qty: 90 TABLET | Refills: 0 | Status: SHIPPED | OUTPATIENT
Start: 2021-08-23 | End: 2021-08-23

## 2021-08-23 RX ORDER — WARFARIN SODIUM 2.5 MG/1
TABLET ORAL
Qty: 90 TABLET | Refills: 0 | Status: SHIPPED | OUTPATIENT
Start: 2021-08-23 | End: 2021-09-15

## 2021-08-23 NOTE — TELEPHONE ENCOUNTER
Prescription approved per [facility] Refill Protocol.  Angelic Rojo Rn  [facility]

## 2021-08-23 NOTE — TELEPHONE ENCOUNTER
Prescription approved per [facility] Refill Protocol.  Angelic Rojo Rn  [facility]

## 2021-08-23 NOTE — TELEPHONE ENCOUNTER
ANTICOAGULATION  MANAGEMENT: Discharge Review    Haresh Rock chart reviewed for anticoagulation continuity of care    Hospital Admission on 08/19-08/21 for Sepsis 2/2 aspiration pneumonia.    Discharge disposition: Home    Results:    Recent labs: (last 7 days)     08/19/21  1635 08/19/21  2223 08/20/21  0752 08/21/21  0658   INR 2.98* 3.34* 3.41* 2.97*     Anticoagulation inpatient management:     less warfarin administered than maintenance regimen    Anticoagulation discharge instructions:     Warfarin dosing: home regimen continued   Bridging: No   INR goal change: No      Medication changes affecting anticoagulation: yes- pt discharged on two more days of azithromycin and Augmentin. Both of these medications can increase INR and risk of bleeding.     Pt also started Rosuvastatin which can increase INR.    Additional factors affecting anticoagulation: No    Plan     Recommend to adjust dose to 08/23    Spoke with Haresh    Anticoagulation Calendar updated    Hari Saavedra RN

## 2021-08-23 NOTE — TELEPHONE ENCOUNTER
Called and spoke with patient.  Informed him of the provider's recommendations.  Patient reports that his blood pressure 10 minutes ago was 112/67.    Patient has a follow-up appointment 8/30/21.  Patient was advised to continue managing the symptoms at home and seek medical assistance for further evaluation and treatment if the symptoms persist or worsen with the recommended home care interventions.    Sent [employer] message.

## 2021-08-23 NOTE — PROGRESS NOTES
Pt on antibiotics - will route to pool.    Incoming fax from [employer] home monitor company    Date of INR 8/23    INR result 2.6

## 2021-08-23 NOTE — TELEPHONE ENCOUNTER
Reason for Call:  Other appointment    Detailed comments: PT got discharged on 8/21 from the hospital and we made him a follow up appt for 8/30 he is wondering if he should be seen before then. Was seen for pneumonia and possible heart damage    Phone Number Patient can be reached at: Cell number on file:    Telephone Information:   Mobile [phone]       Best Time: anytime    Can we leave a detailed message on this number? YES    Call taken on 8/23/2021 at 2:55 PM by Wilma Almeida

## 2021-08-23 NOTE — PROGRESS NOTES
Clinic Care Coordination Contact    Background: Care Coordination referral placed from [facility] [place] discharge report for reason of patient meeting criteria for a TCM outreach call by [facility] [place] team.    Assessment: Upon chart review, CCRC Team member will cancel/close the referral for TCM outreach due to reason below:     Patient declined completing post hopsital discharge questions as he reports he just spoke to RN with [facility] and has PCP follow up scheduled for 8/30/21.       Plan: Care Coordination referral for TCM outreach canceled.    Fernanda Licea RN  [facility] Resource [facility]

## 2021-08-23 NOTE — TELEPHONE ENCOUNTER
"Patient recently in hospital for aspiration pneumonia. Patient has Parkinsons.   Finished azithromycin today for pneumonia.  Afebrile, very little cough. Feeling more tired than usual, sleeping good, eating good.   Denies dizziness or lightheadedness unless getting up from sitting and then gets up slow. Patient is hydrated as he is drinking more liquids than normal because he switched to thickened liquids due to his Parkinsons. Urinating every 1-2 hours due to this.  Taking same dose of metoprolol 25 mg daily.  This did not change in the hospital.  Took BP 40 mins ago and it was 92/60 and patient is concerned.     Routing to provider to advise.        Reason for Disposition    Systolic BP  while taking blood pressure medications and NOT dizzy, lightheaded or weak    Additional Information    Negative: Systolic BP < 90 and feeling dizzy, lightheaded, or weak    Negative: Started suddenly after an allergic medicine, an allergic food, or bee sting    Negative: Shock suspected (e.g., cold/pale/clammy skin, too weak to stand, low BP, rapid pulse)    Negative: Difficult to awaken or acting confused  (e.g., disoriented, slurred speech)    Negative: Fainted    Negative: Chest pain    Negative: Bleeding (e.g., vomiting blood, rectal bleeding or tarry stools, severe vaginal bleeding)    Negative: Extra heart beats or heart is beating fast  (i.e., \"palpitations\")    Negative: Sounds like a life-threatening emergency to the triager    Negative: Systolic BP < 80 and NOT dizzy, lightheaded or weak (feels normal)    Negative: Abdominal pain    Negative: Major surgery in the past month    Negative: Fever > 100.4 F (38.0 C)    Negative: Drinking very little and has signs of dehydration (e.g., no urine > 12 hours, very dry mouth, very lightheaded)    Negative: Fall in systolic BP > 20 mm Hg from normal and feeling dizzy, lightheaded, or weak    Negative: Patient sounds very sick or weak to the triager    Negative: Systolic BP < " "90 and NOT dizzy, lightheaded or weak    Answer Assessment - Initial Assessment Questions  1. BLOOD PRESSURE: \"What is the blood pressure?\" \"Did you take at least two measurements 5 minutes apart?\"      92/60  2. ONSET: \"When did you take your blood pressure?\"      2:20 pm  3. HOW: \"How did you obtain the blood pressure?\" (e.g., visiting nurse, automatic home BP monitor)      Automatic home BP machine  4. HISTORY: \"Do you have a history of low blood pressure?\" \"What is your blood pressure normally?\"      sometimes  5. MEDICATIONS: \"Are you taking any medications for blood pressure?\" If yes: \"Have they been changed recently?\"      metoprolol  6. PULSE RATE: \"Do you know what your pulse rate is?\"       76 BPM  7. OTHER SYMPTOMS: \"Have you been sick recently?\" \"Have you had a recent injury?\"      In hospital recently for aspiration pneumonia  8. PREGNANCY: \"Is there any chance you are pregnant?\" \"When was your last menstrual period?\"      NA    Protocols used: LOW BLOOD PRESSURE-A-OH    Sherri Barba BSN, RN    "

## 2021-08-23 NOTE — TELEPHONE ENCOUNTER
Okay to hold metoprolol today if he hasn't taken yet and reduce dose to half tablet (12.5 mg) daily. Schedule hospital follow-up appointment within 1 week. Okay to see Farrah Jain or Rosy.   Anya Nowak MD

## 2021-08-23 NOTE — PROGRESS NOTES
ANTICOAGULATION MANAGEMENT     Haresh Rock 71 year old male is on warfarin with therapeutic INR result. (Goal INR 2.0-3.0)    Recent labs: (last 7 days)     08/23/21  0931   INR 2.6*       ASSESSMENT     Source(s): Chart Review and Patient/Caregiver Call       Warfarin doses taken: Warfarin taken as instructed    Diet: No new diet changes identified    New illness, injury, or hospitalization: Yes: Pneumonia    Medication/supplement changes: Last day of Augmentin and Azithro; started on rosuvastatin    Signs or symptoms of bleeding or clotting: No    Previous INR: Therapeutic last visit; previously outside of goal range    Additional findings: None     PLAN     Recommended plan for temporary change(s) affecting INR     Dosing Instructions: Continue your current warfarin dose with next INR in 4 days       Summary  As of 8/23/2021    Full warfarin instructions:  3.75 mg every Fri; 2.5 mg all other days   Next INR check:               Telephone call with Haresh who verbalizes understanding and agrees to plan    Patient to recheck with home meter    Education provided: Please call back if any changes to your diet, medications or how you've been taking warfarin, Monitoring for bleeding signs and symptoms and Monitoring for clotting signs and symptoms    Plan made per [facility] anticoagulation protocol    Allyn Doe RN  Anticoagulation Clinic  8/23/2021    _______________________________________________________________________     Anticoagulation Episode Summary     Current INR goal:  2.0-3.0   TTR:  60.1 % (1 y)   Target end date:  Indefinite   Send INR reminders to:  CALVIN RANJANA    Indications    Long-term (current) use of anticoagulants [Z79.01] [Z79.01]  Atrial fibrillation (H) [I48.91]  Antiphospholipid antibody syndrome (H) (Resolved) [D68.61]  Personal history of DVT (deep vein thrombosis) (Resolved) [Z86.718]  Atrial fibrillation  unspecified type (H) (Resolved) [I48.91]  Paroxysmal atrial fibrillation (H)  [I48.0]           Comments:  JESSICA okay to manage by exception         Anticoagulation Care Providers     Provider Role Specialty Phone number    Anya Nowak MD Referring Family Medicine [phone]

## 2021-08-23 NOTE — TELEPHONE ENCOUNTER
Reached out to patient to inquire further. He states that this has been addressed already and he has appointment scheduled for next Monday with Dr. Gallegos.    GLADYS WaltersN RN  [facility]

## 2021-08-25 ENCOUNTER — TELEPHONE (OUTPATIENT)
Dept: CARDIOLOGY | Facility: CLINIC | Age: 71
End: 2021-08-25

## 2021-08-25 ENCOUNTER — TELEPHONE (OUTPATIENT)
Dept: UROLOGY | Facility: CLINIC | Age: 71
End: 2021-08-25

## 2021-08-25 NOTE — TELEPHONE ENCOUNTER
Called and spoke to patient.   Patient is status post-TURP 8/9/2021  Patient states that he is experiencing gross hematuria.   Patient denies, fever, chills or nausea.   Per Dr. Hawley this is normal post-Turp.   Patient is on coumadin which can increase the hematuria.   Patient reassured and will contact our office if condition changes or worsens.   Michelle Reyes RN

## 2021-08-25 NOTE — TELEPHONE ENCOUNTER
Patient called and left message requesting appt. Needs follow up in Gen cards not heart failure per discharge instructions. Patient has only a mild decrease in EF. Sending to [place] location as this is preferred location.     Leif Marie CMA  Heart Failure, Advanced Heart Failure & CORE  Referral Specialist &     [facility]  Cardiology  Office: [phone]

## 2021-08-26 ENCOUNTER — ANTICOAGULATION THERAPY VISIT (OUTPATIENT)
Dept: FAMILY MEDICINE | Facility: CLINIC | Age: 71
End: 2021-08-26

## 2021-08-26 DIAGNOSIS — Z79.01 LONG TERM CURRENT USE OF ANTICOAGULANT THERAPY: Primary | ICD-10-CM

## 2021-08-26 DIAGNOSIS — I48.0 PAROXYSMAL ATRIAL FIBRILLATION (H): ICD-10-CM

## 2021-08-26 NOTE — TELEPHONE ENCOUNTER
Patient needs an appt with gen cardiology (Dr. Hancock) with a cardiac stress mri prior.  See below. If patient wants to see the cardiologist first that is ok too.  Please call patient to set up.    Tiffany Mckeon RN  Cardiology Care Coordinator  [facility]  [phone] option 1        8/19/21 discharge instructions:    Follow-ups Needed After Discharge     Follow-up Appointments     Adult [facility] Follow-up and recommended labs and tests      Follow up with primary care provider, Anya Nowak, within 7 days for   hospital follow- up.  No follow up labs or test are needed.    Follow up with General Cardiology within 1 month  for hospital follow- up.   The following labs/tests are recommended: Cardiac MRI.     Appointments on [place] and/or [facility] (with [facility] or [facility]   provider or service). Call [phone] if you haven't heard regarding   these appointments within 7 days of discharge.            - Follow up for Video Swallow Study and further speech therapy   - Cardiology follow up and stress cardiac MRI

## 2021-08-27 ENCOUNTER — TRANSFERRED RECORDS (OUTPATIENT)
Dept: HEALTH INFORMATION MANAGEMENT | Facility: CLINIC | Age: 71
End: 2021-08-27

## 2021-08-27 ENCOUNTER — ANTICOAGULATION THERAPY VISIT (OUTPATIENT)
Dept: FAMILY MEDICINE | Facility: CLINIC | Age: 71
End: 2021-08-27

## 2021-08-27 DIAGNOSIS — I48.91 ATRIAL FIBRILLATION (H): ICD-10-CM

## 2021-08-27 DIAGNOSIS — Z79.01 LONG TERM CURRENT USE OF ANTICOAGULANT THERAPY: Primary | ICD-10-CM

## 2021-08-27 DIAGNOSIS — I48.0 PAROXYSMAL ATRIAL FIBRILLATION (H): ICD-10-CM

## 2021-08-27 LAB — INR (EXTERNAL): 2.1 (ref 2–3)

## 2021-08-27 NOTE — PROGRESS NOTES
ANTICOAGULATION  MANAGEMENT-Patient Home Monitoring Result    Assessment     Therapeutic INR result of 2.1 . Goal range 2.0-3.0. Received via fax from [employer] INR monitoring company.        Previous INR was therapeutic    Haresh was last contacted by phone: 8/10/2021    Plan     Per home monitoring agreement with patient, patient was NOT contacted regarding therapeutic result today.  Patient is to continue current dose and continue to check INR with home monitor per protocol.  ?       OBJECTIVE    INR (External)   Date Value Ref Range Status   2021 2.1 2 - 3 Final     Factor 2 Assay   Date Value Ref Range Status   2007 58 (L) 60 - 140 % Final     Comment:     (Note)  CALLED TO TAMARA(INTERV. RADIOLOGY)[id number],        ASSESSMENT / PLAN  No question data found.  Anticoagulation Summary  As of 2021    INR goal:  2.0-3.0   TTR:  60.7 % (1 y)   INR used for dosin.1 (2021)   Warfarin maintenance plan:  3.75 mg (2.5 mg x 1.5) every Fri; 2.5 mg (2.5 mg x 1) all other days   Full warfarin instructions:  3.75 mg every Fri; 2.5 mg all other days   Weekly warfarin total:  18.75 mg   No change documented:  Leslie Mascorro RN   Plan last modified:  Laverne Ferguson, KOSTAS (2021)   Next INR check:     Priority:  Maintenance   Target end date:  Indefinite    Indications    Long-term (current) use of anticoagulants [Z79.01] [Z79.01]  Atrial fibrillation (H) [I48.91]  Antiphospholipid antibody syndrome (H) (Resolved) [D68.61]  Personal history of DVT (deep vein thrombosis) (Resolved) [Z86.718]  Atrial fibrillation  unspecified type (H) (Resolved) [I48.91]  Paroxysmal atrial fibrillation (H) [I48.0]             Anticoagulation Episode Summary     INR check location:      Preferred lab:  EXTERNAL LAB    Send INR reminders to:  CHELSEA CHILEL    Comments:  JESSICA rodas to manage by exception      Anticoagulation Care Providers     Provider Role Specialty Phone number    Anya Nowak MD  [facility] Family Medicine [phone]          Angelic Rojo Rn  [facility]

## 2021-08-27 NOTE — TELEPHONE ENCOUNTER
Scheduled first available MRI. OCT. Santi Kam MD  In sept. .Kevin De La Cruz L.P.N.,Danisha curtis. Dept.

## 2021-08-30 ENCOUNTER — OFFICE VISIT (OUTPATIENT)
Dept: FAMILY MEDICINE | Facility: CLINIC | Age: 71
End: 2021-08-30
Payer: COMMERCIAL

## 2021-08-30 VITALS
OXYGEN SATURATION: 98 % | DIASTOLIC BLOOD PRESSURE: 61 MMHG | RESPIRATION RATE: 19 BRPM | SYSTOLIC BLOOD PRESSURE: 93 MMHG | WEIGHT: 144.8 LBS | BODY MASS INDEX: 20.27 KG/M2 | HEIGHT: 71 IN | HEART RATE: 75 BPM

## 2021-08-30 DIAGNOSIS — G20.A1 PARKINSON DISEASE (H): ICD-10-CM

## 2021-08-30 DIAGNOSIS — R63.4 WEIGHT LOSS: ICD-10-CM

## 2021-08-30 DIAGNOSIS — J18.9 PNEUMONIA DUE TO INFECTIOUS ORGANISM, UNSPECIFIED LATERALITY, UNSPECIFIED PART OF LUNG: ICD-10-CM

## 2021-08-30 DIAGNOSIS — Z09 HOSPITAL DISCHARGE FOLLOW-UP: Primary | ICD-10-CM

## 2021-08-30 DIAGNOSIS — R13.10 DYSPHAGIA, UNSPECIFIED TYPE: ICD-10-CM

## 2021-08-30 PROCEDURE — 99495 TRANSJ CARE MGMT MOD F2F 14D: CPT | Performed by: FAMILY MEDICINE

## 2021-08-30 ASSESSMENT — PAIN SCALES - GENERAL: PAINLEVEL: NO PAIN (0)

## 2021-08-30 ASSESSMENT — MIFFLIN-ST. JEOR: SCORE: 1421.81

## 2021-08-30 NOTE — PROGRESS NOTES
Assessment & Plan   Haresh is a 70 yo M with BPH, parkinisonism, chronic anticoagulation, persistently elevated ESR, hx of BM transplant for MPD), hodgkin dx in remission, hx of PE with antiphospholipid syndrome, htn, atrial fib here for hospital discharge follow up after treatment for pneumonia.    Hospital discharge follow-up    Improved, has no SOB.    Pneumonia due to infectious organism   Completed antibiotics as directed and feeling much better    Dysphagia, unspecified type    Has continued to have some swallowing concerns; followed up had been recommended after discharge  - Speech Therapy Referral; Future    Parkinson disease (H)    Symptomatic but functional   - Speech Therapy Referral; Future    Weight loss    -  Possibly due poor intake due to dysphagia    Speech [id number]}    Return in about 1 month (around 9/30/2021) for Routine Visit.    Jose Manuel Gallegos MD  [facility] CLINIC [facility]    Arsh Hutson is a 71 year old who presents for the following health issues     HPI     Feeling better,  Had a spell of dizziness    Weight loss: not eating much, also has dysphagia: thickened liquids go down much easier. Blends the his meals sometimes.       Hospital Follow-up Visit:  Hospital/Nursing Home/IP Rehab Facility: [facility] 8/19/2021  Date of Admission: [facility] 8/19/2021-8/21/2021  Reason(s) for Admission: Sepsis 2/2 Aspiration Pneumonia  Dysphagia 2/2 Parkinson's disease; Severe malnutrition  Myocardial Demand Ischemia 2/2 Sepsis; Newly reduced EF; JOANNA    Date of Admission:  8/19/2021  Date of Discharge:  8/21/2021  Discharging Provider: Deidra Jean-Baptiste MD  Discharge Team: Hospitalist Service, Gold 8     Discharge Diagnoses     Sepsis 2/2 Aspiration Pneumonia  Dysphagia 2/2 Parkinson's disease  Severe malnutrition  Myocardial Demand Ischemia 2/2 Sepsis  Newly reduced EF  JOANNA     Follow-ups Needed After Discharge     Follow-up Appointments     Adult  [facility] Follow-up and recommended labs and tests      Follow up with primary care provider, Anya Nowak, within 7 days for   hospital follow- up.  No follow up labs or test are needed.    Follow up with General Cardiology within 1 month  for hospital follow- up.   The following labs/tests are recommended: Cardiac MRI.     Appointments on [place] and/or [facility] (with [facility] or [facility]   provider or service). Call [phone] if you haven't heard regarding   these appointments within 7 days of discharge.     Was your hospitalization related to COVID-19? No   Problems taking medications regularly:  None  Medication changes since discharge: None  Problems adhering to non-medication therapy:  None    Summary of hospitalization:  [facility] information obtained and reviewed     Haresh Rock is a 71 year old man admitted on 8/19/2021. He has a history of diet-controlled diabetes type 2, CKD, DVT, A. fib ablation (on warfarin, flecanide, metop), antiphospholipid antibody syndrome, Parkinson's disease (hx of dysphagia) and Hodgkin's lymphoma s/p BMT in 2007 who presented with pneumonia and elevated troponin      # CAP, Suspected aspiration pneumonia  # Dysphagia  - Initially treated with Zosyn --> ceftriaxone/azithro --> augmentin/azithromycin on discharge to complete 5 days abx    Diagnostic Tests/Treatments reviewed.  Follow up needed: Speech therapy    Other Healthcare Providers Involved in Patient s Care:         Specialist appointment - .     Update since discharge: improved.     Post Discharge Medication Reconciliation: discharge medications reconciled, continue medications without change.  Plan of care communicated with patient     Follow up:   - Follow up for Video Swallow Study and further speech therapy       Speech evaluated: recommend minced and moist diet with mildly thick liquids. Needs VFSS but this      can be done as outpatient.    - Cardiology follow up and stress cardiac MRI        Wt  "Readings from Last 4 Encounters:   08/30/21 65.7 kg (144 lb 12.8 oz)   08/19/21 68 kg (150 lb)   08/17/21 69.9 kg (154 lb)   08/04/21 69.9 kg (154 lb 3.2 oz)       Review of Systems   HEENT, cardiovascular, pulmonary, gi and gu systems are negative, except as otherwise noted.      Objective    BP 93/61 (BP Location: Right arm)   Pulse 75   Resp 19   Ht 1.8 m (5' 10.87\")   Wt 65.7 kg (144 lb 12.8 oz)   SpO2 98%   BMI 20.27 kg/m    Body mass index is 20.27 kg/m .  Physical Exam   GENERAL: healthy, alert and no distress  RESP: lungs clear to auscultation - no rales, rhonchi or wheezes  CV: regular rate and rhythm, no murmur, click or rub, no peripheral edema  MS: no gross musculoskeletal defects noted, no edema  NEURO: Unsteady slow gait, mentation intact and speech normal  PSYCH: mentation appears normal, affect normal/bright    "

## 2021-09-02 ENCOUNTER — DOCUMENTATION ONLY (OUTPATIENT)
Dept: OTHER | Facility: CLINIC | Age: 71
End: 2021-09-02

## 2021-09-03 ENCOUNTER — TRANSFERRED RECORDS (OUTPATIENT)
Dept: HEALTH INFORMATION MANAGEMENT | Facility: CLINIC | Age: 71
End: 2021-09-03

## 2021-09-03 ENCOUNTER — ANTICOAGULATION THERAPY VISIT (OUTPATIENT)
Dept: FAMILY MEDICINE | Facility: CLINIC | Age: 71
End: 2021-09-03

## 2021-09-03 DIAGNOSIS — I48.91 ATRIAL FIBRILLATION (H): ICD-10-CM

## 2021-09-03 DIAGNOSIS — Z79.01 LONG TERM CURRENT USE OF ANTICOAGULANT THERAPY: Primary | ICD-10-CM

## 2021-09-03 DIAGNOSIS — I48.0 PAROXYSMAL ATRIAL FIBRILLATION (H): ICD-10-CM

## 2021-09-03 LAB — INR (EXTERNAL): 2.8 (ref 2–3)

## 2021-09-03 NOTE — PROGRESS NOTES
ANTICOAGULATION  MANAGEMENT-Patient Home Monitoring Result    Assessment     Therapeutic INR result of 2.8 . Goal range 2.0-3.0. Received via fax from [employer] home INR monitoring company.        Previous INR was therapeutic    Haresh was last contacted by phone:     Plan     Per home monitoring agreement with patient, patient was NOT contacted regarding therapeutic result today.  Patient is to continue current dose and continue to check INR with home monitor per protocol.  ?       OBJECTIVE    INR (External)   Date Value Ref Range Status   2021 2.8 2 - 3 Final     Factor 2 Assay   Date Value Ref Range Status   2007 58 (L) 60 - 140 % Final     Comment:     (Note)  CALLED TO TAMARA(INTERV. RADIOLOGY)[id number],        ASSESSMENT / PLAN  No question data found.  Anticoagulation Summary  As of 9/3/2021    INR goal:  2.0-3.0   TTR:  60.8 % (1 y)   INR used for dosin.8 (9/3/2021)   Warfarin maintenance plan:  3.75 mg (2.5 mg x 1.5) every Fri; 2.5 mg (2.5 mg x 1) all other days   Full warfarin instructions:  3.75 mg every Fri; 2.5 mg all other days   Weekly warfarin total:  18.75 mg   No change documented:  Macrina Preston, RN   Plan last modified:  Laverne Ferguson, KOSTAS (2021)   Next INR check:     Priority:  Maintenance   Target end date:  Indefinite    Indications    Long-term (current) use of anticoagulants [Z79.01] [Z79.01]  Atrial fibrillation (H) [I48.91]  Antiphospholipid antibody syndrome (H) (Resolved) [D68.61]  Personal history of DVT (deep vein thrombosis) (Resolved) [Z86.718]  Atrial fibrillation  unspecified type (H) (Resolved) [I48.91]  Paroxysmal atrial fibrillation (H) [I48.0]             Anticoagulation Episode Summary     INR check location:      Preferred lab:  EXTERNAL LAB    Send INR reminders to:  CHELSEA CHILEL    Comments:  JESSICA rodas to manage by exception      Anticoagulation Care Providers     Provider Role Specialty Phone number    Anya Nowak MD Referring Family  Medicine [phone]          Macrina Avila, RN, BSN, PHN  [facility] Nurse  [phone]

## 2021-09-04 ENCOUNTER — HEALTH MAINTENANCE LETTER (OUTPATIENT)
Age: 71
End: 2021-09-04

## 2021-09-10 ENCOUNTER — OFFICE VISIT (OUTPATIENT)
Dept: NEUROLOGY | Facility: CLINIC | Age: 71
End: 2021-09-10
Payer: COMMERCIAL

## 2021-09-10 ENCOUNTER — ANTICOAGULATION THERAPY VISIT (OUTPATIENT)
Dept: FAMILY MEDICINE | Facility: CLINIC | Age: 71
End: 2021-09-10

## 2021-09-10 ENCOUNTER — TRANSFERRED RECORDS (OUTPATIENT)
Dept: HEALTH INFORMATION MANAGEMENT | Facility: CLINIC | Age: 71
End: 2021-09-10

## 2021-09-10 DIAGNOSIS — I48.91 ATRIAL FIBRILLATION (H): ICD-10-CM

## 2021-09-10 DIAGNOSIS — I48.0 PAROXYSMAL ATRIAL FIBRILLATION (H): ICD-10-CM

## 2021-09-10 DIAGNOSIS — G57.32 PERONEAL NEUROPATHY, LEFT: Primary | ICD-10-CM

## 2021-09-10 DIAGNOSIS — G62.9 POLYNEUROPATHY: ICD-10-CM

## 2021-09-10 DIAGNOSIS — Z79.01 LONG TERM CURRENT USE OF ANTICOAGULANT THERAPY: Primary | ICD-10-CM

## 2021-09-10 LAB — INR (EXTERNAL): 4.6 (ref 2–3)

## 2021-09-10 PROCEDURE — 95885 MUSC TST DONE W/NERV TST LIM: CPT | Mod: 59 | Performed by: PHYSICAL MEDICINE & REHABILITATION

## 2021-09-10 PROCEDURE — 95911 NRV CNDJ TEST 9-10 STUDIES: CPT | Performed by: PHYSICAL MEDICINE & REHABILITATION

## 2021-09-10 PROCEDURE — 95886 MUSC TEST DONE W/N TEST COMP: CPT | Performed by: PHYSICAL MEDICINE & REHABILITATION

## 2021-09-10 NOTE — PROGRESS NOTES
Test Date:  9/10/2021    Patient: Haresh Rock : 1950 Physician: Gina Graff MD   Sex: Male Height:  180 cm Ref Phys: Michael Coon, DO   ID#: [id number] Weight:  141 lbs. Technician: Cristal Kurtz       Clinical Information:  Haresh Rock is a 72 yo male with h/o polyneuropathy who presents with left foot drop. Query left peroneal neuropathy vs lumbar radiculopathy.       Techniques:  Motor and sensory conduction studies were done with surface recording electrodes. Temperature was monitored and recorded throughout the study. Upper extremities were maintained at a temperature of 32 degrees Centigrade or higher. Lower extremities were maintained at a temperature of 31degrees Centigrade or higher. EMG was done with a concentric needle electrode.           Interpretation:  Abnormal study.     --There is electrodiagnostic evidence to suggest the presence of a severe left-sided common peroneal neuropathy. The  deep branch of the peroneal nerve appears significantly more affected than does the superficial peroneal branch. Peroneal neuropathies commonly localize to the fibular neck, however, strictly speaking the current study does not allow a more precise localization given the presence of polyneuropathy. There is electrodiagnostic evidence of axonal injury indicating that recovery will be dependent on some degree of nerve regeneration. Clinical correlation is recommended.    --There is electrodiagnostic evidence of a sensorimotor, length dependent, predominantly axonal polyneuropathy.    --There is no electrodiagnostic evidence left lower extremity motor radiculopathy or plexopathy.        Results:  Evaluation of the left Dp Branch Fibular (TA) motor and the right Dp Branch Fibular (TA) motor nerves showed prolonged distal onset latency (Pop Fossa, L6.7, R5.8 ms) and reduced amplitude. Conduction velocity was reduced on the left side.     The left Fibular (EDB) motor nerve showed reduced  amplitude (0.55 mV), decreased conduction velocity (Bel Fib Head-Ankle, 36 m/s), and decreased conduction velocity (Pop Fossa-Bel Fib Head, 26 m/s).  The right Fibular (EDB) motor nerve showed decreased conduction velocity (Bel Fib Head-Ankle, 34 m/s) and decreased conduction velocity (Pop Fossa-Bel Fib Head, 35 m/s).      The left Tibial (AHB) motor nerve showed reduced amplitude (1.11 mV) and decreased conduction velocity (35 m/s).  The right Tibial (AHB) motor nerve showed reduced amplitude (1.07 mV).      The left sural sensory and the right sural sensory nerves showed no response.      All remaining nerves (as indicated in the following tables) were within normal limits.  Left vs. Right side comparison data for the Fibular (EDB) motor nerve indicates abnormal L-R amplitude difference (-84.3 %).  All remaining left vs. right side differences were within normal limits.      EMG showed positive sharp waves and fibrillations at the left tibialis anterior, EHL and peroneus longus muscles; otherwise, no muscle showed abnormal spontaneous activity.     Few polyphasic, and long-duration MUPs, were detected at the left tibialis anterior, EHL and peroneus longus muscles, with moderately to severely reduced recruitment patterns.     Otherwise, EMG of selected muscles in bilateral lower extremities was normal as tabulated below.          ___________________________  Gina Graff MD        Nerve Conduction Studies  Motor Sites      Latency Amplitude Neg. Dur Temperature Segment Delta-O Distance Velocity Comment   Site (ms) Norm (mV) Norm ms  C  ms cm m/s    Left Dp Branch Fibular (TA) Motor   Fib Head 3.0  < 4.2 2.8 > 5.0 13.4 29.9        Pop Fossa 6.7  < 5.7 1.66 - 9.6 29.9 Pop Fossa-Fib Head 3.7 12 32    Right Dp Branch Fibular (TA) Motor   Fib Head 3.5  < 4.2 3.8 > 5.0 16.8 30.2        Pop Fossa 5.8  < 5.7 3.9 - 15.8 30.1 Pop Fossa-Fib Head 2.3 9.5 41    Left Fibular (EDB) Motor   Ankle 5.1  < 6.0 0.55  > 2.0 11.6 28.7  Ankle-EDB  8     Bel Fib Head 13.8 - 0.28 - 10.6 29.2 Bel Fib Head-Ankle 8.7 31 36    Pop Fossa 18.4 - 0.27 - 10.4 29.4 Pop Fossa-Bel Fib Head 4.6 12 26    Right Fibular (EDB) Motor   Ankle 5.4  < 6.0 3.5  > 2.0 7.2 30 Ankle-EDB  8     Bel Fib Head 14.6 - 2.3 - 6.8 30.1 Bel Fib Head-Ankle 9.2 31.5 34    Pop Fossa 17.3 - 2.0 - 7.0 30.1 Pop Fossa-Bel Fib Head 2.7 9.5 35    Left Median (APB) Motor   Wrist 3.6  < 4.2 8.9  > 5.0 6.9 31 Wrist-APB  8     Elbow 9.0 - 8.3 - 7.4 32 Elbow-Wrist 5.4 27 50    Left Tibial (AHB) Motor   Ankle 5.9  < 6.5 1.11  > 4.4 8.2 29.7 Ankle-AHB  8     Knee 17.5 - 0.92 - 7.5 29.8 Knee-Ankle 11.6 41 35    Right Tibial (AHB) Motor   Ankle 4.7  < 6.5 1.07  > 4.4 6.8 30.2 Ankle-AHB  8     Knee 15.3 - 0.78 - 10.0 30.2 Knee-Ankle 10.6 41 39      L-R Sites      Latency Amplitude   Site L Lat. R Lat. L-R Lat. L-R Lat. L Amp. R Amp. L-R Amp L-R Amp   Dp Branch Fibular (TA) Motor   Fib Head 3.0 3.5 0.50 - 2.8 3.8 -26.3 -   Pop Fossa 6.7 5.8 0.90 - 1.66 3.9 -57.4 -   Fibular (EDB) Motor   Ankle 5.1 5.4 0.30 -1.60...1.60 0.55 3.5 -84.3 -61.0...61.0   Bel Fib Head 13.8 14.6 0.80 - 0.28 2.3 -87.8 -   Pop Fossa 18.4 17.3 1.10 - 0.27 2.0 -86.5 -   Median (APB) Motor   Wrist 3.6  - -0.70...0.70 8.9  - -54.0...54.0   Elbow 9.0  - - 8.3  - -   Tibial (AHB) Motor   Ankle 5.9 4.7 1.20 -1.40...1.40 1.11 1.07 -3.6 -50.0...50.0   Knee 17.5 15.3 2.2 - 0.92 0.78 -15.2 -     L-R Segments      Velocity   Segment L Smooth R Smooth L-R Smooth L-R Smooth   Dp Branch Fibular (TA) Motor   Pop Fossa-Fib Head 32 41 9 -   Fibular (EDB) Motor   Ankle-EDB    -   Bel Fib Head-Ankle 36 34 2 -8...8   Pop Fossa-Bel Fib Head 26 35 9 -19...19   Median (APB) Motor   Wrist-APB   - -   Elbow-Wrist 50  - -9...9   Tibial (AHB) Motor   Ankle-AHB    -   Knee-Ankle 35 39 4 -10...10     Sensory Sites      Onset Lat Amp (O-P) Temperature Segment Delta-O Distance Velocity Comment   Site ms  V Norm  C  ms cm m/s    Left Radial Sensory   Forearm-Wrist 2.2 36   > 15 30.6 Forearm-Wrist  10 45    Left Sural Sensory   Calf-Lat Mall NR NR  > 5 30.4 Calf-Lat Mall  14 NR    Right Sural Sensory   Calf-Lat Mall NR NR  > 5 30.4 Calf-Lat Mall  14 NR      L-R Sites      Peak Latency Peak Amplitude   Site L-Lat R-Lat L-R Lat L-R Lat L-Amp R-Amp L-R Amp L-R Amp   Radial Sensory   Forearm-Wrist 3.0  - - 28  - -   Sural Sensory   Calf-Lat Mall NR NR NR - NR NR NR -     L-R Segments      Velocity   Segment L Smooth R-Smooth L-R Smooth L-R Smooth   Radial Sensory   Forearm-Wrist 33  - -   Sural Sensory   Calf-Lat Mall NR NR NR -       Electromyography     Side Muscle Nerve Root Ins Act Fibs/PSW Fasc HF Amp Dur Poly Recrt Int Pat Comment   Left Biceps Fem SH Sciatic L5-S1 Nml Nml Nml 0 Nml Nml 0 Nml Nml    Left Ext Rain Long Fibular,  Deep Fibula... L5-S1 Incr 3+ Nml 0 Nml Nml 1+ Dis Nml    Left Fib Longus Fibular,  Superficial... L5-S1 Incr 3+ Nml 0 Nml Incr 2+ mildlyred Nml    Left Gastroc Tibial S1-S2 Nml Nml Nml 0 Nml Nml 0 Nml Nml    Left Gluteus Med Sup Gluteal L5-S1 Nml Nml Nml 0 Nml Nml 0 Nml Nml    Right Tib Anterior Fibular,  Deep Fibula... L4-L5 Nml Nml Nml 0 Nml Nml 0 Nml Nml    Left Tib Anterior Fibular,  Deep Fibula... L4-L5 Incr 2+ Nml 0 Nml Incr 2+ modyred Nml    Left Tib Posterior Tibial L5-S1 Nml Nml Nml 0 Nml Nml 0 Nml Nml    Left Vastus Lat Femoral L2-L4 Nml Nml Nml 0 Nml Nml 0 Nml Nml          NCS Waveforms:    Motor                         Sensory

## 2021-09-10 NOTE — PROGRESS NOTES
ANTICOAGULATION MANAGEMENT     Haresh EMILIE Altamiranochloé 71 year old male is on warfarin with supratherapeutic INR result. (Goal INR 2.0-3.0)    Recent labs: (last 7 days)     09/10/21  1306   INR 4.6*       ASSESSMENT     Source(s): Chart Review and Patient/Caregiver Call       Warfarin doses taken: Warfarin taken as instructed    Diet: No new diet changes identified    New illness, injury, or hospitalization: No--patient had pneumonia on 8/19--patient states has been feeling some SOB slightly lately. Patient encouraged to go to office with PCP for f/u. Patient denies any other questions as to why INR could be high.pt able to complete sentences; no SOB audible on telephone--no audible wheezing    Medication/supplement changes: None noted    Signs or symptoms of bleeding or clotting: No    Previous INR: Therapeutic last visit; previously outside of goal range    Additional findings: patient requesting to check INR sooner due to INR being elevated. Will chaeck on Monday 9/13     PLAN     Recommended plan for temporary change(s) affecting INR --some slight SOB    Dosing Instructions: Hold dose then continue your current warfarin dose with next INR in 3 days       Summary  As of 9/10/2021    Full warfarin instructions:  9/10: Hold; Otherwise 3.75 mg every Fri; 2.5 mg all other days   Next INR check:  9/17/2021             Telephone call with Haresh who verbalizes understanding and agrees to plan and who agrees to plan and repeated back plan correctly    Patient to recheck with home meter    Education provided: Please call back if any changes to your diet, medications or how you've been taking warfarin    Plan made per [facility] anticoagulation protocol    Belinda Lafleur, RN  Anticoagulation Clinic  9/10/2021    _______________________________________________________________________     Anticoagulation Episode Summary     Current INR goal:  2.0-3.0   TTR:  59.9 % (1 y)   Target end date:  Indefinite   Send INR reminders to:  CHELSEA  KASOTA    Indications    Long-term (current) use of anticoagulants [Z79.01] [Z79.01]  Atrial fibrillation (H) [I48.91]  Antiphospholipid antibody syndrome (H) (Resolved) [D68.61]  Personal history of DVT (deep vein thrombosis) (Resolved) [Z86.718]  Atrial fibrillation  unspecified type (H) (Resolved) [I48.91]  Paroxysmal atrial fibrillation (H) [I48.0]           Comments:  JESSICA rodas to manage by exception         Anticoagulation Care Providers     Provider Role Specialty Phone number    Anya Nowak MD Referring Family Medicine [phone]

## 2021-09-10 NOTE — LETTER
9/10/2021       RE: Haresh Rock  [address] [place] [address]     Dear Colleague,    Thank you for referring your patient, Haresh Rock, to the [facility] [place] at [facility]. Please see a copy of my visit note below.    Test Date:  9/10/2021    Patient: Haresh Rock : 1950 Physician: Gina Graff MD   Sex: Male Height:  180 cm Ref Phys: Shaggy Michaelalon Finney, DO   ID#: [id number] Weight:  141 lbs. Technician: Cristal Kurtz       Clinical Information:  Haresh Rock is a 72 yo male with h/o polyneuropathy who presents with left foot drop. Query left peroneal neuropathy vs lumbar radiculopathy.       Techniques:  Motor and sensory conduction studies were done with surface recording electrodes. Temperature was monitored and recorded throughout the study. Upper extremities were maintained at a temperature of 32 degrees Centigrade or higher. Lower extremities were maintained at a temperature of 31degrees Centigrade or higher. EMG was done with a concentric needle electrode.           Interpretation:  Abnormal study.     --There is electrodiagnostic evidence to suggest the presence of a severe left-sided common peroneal neuropathy. The  deep branch of the peroneal nerve appears significantly more affected than does the superficial peroneal branch. Peroneal neuropathies commonly localize to the fibular neck, however, strictly speaking the current study does not allow a more precise localization given the presence of polyneuropathy. There is electrodiagnostic evidence of axonal injury indicating that recovery will be dependent on some degree of nerve regeneration. Clinical correlation is recommended.    --There is electrodiagnostic evidence of a sensorimotor, length dependent, predominantly axonal polyneuropathy.    --There is no electrodiagnostic evidence left lower extremity motor radiculopathy or  plexopathy.        Results:  Evaluation of the left Dp Branch Fibular (TA) motor and the right Dp Branch Fibular (TA) motor nerves showed prolonged distal onset latency (Pop Fossa, L6.7, R5.8 ms) and reduced amplitude. Conduction velocity was reduced on the left side.     The left Fibular (EDB) motor nerve showed reduced amplitude (0.55 mV), decreased conduction velocity (Bel Fib Head-Ankle, 36 m/s), and decreased conduction velocity (Pop Fossa-Bel Fib Head, 26 m/s).  The right Fibular (EDB) motor nerve showed decreased conduction velocity (Bel Fib Head-Ankle, 34 m/s) and decreased conduction velocity (Pop Fossa-Bel Fib Head, 35 m/s).      The left Tibial (AHB) motor nerve showed reduced amplitude (1.11 mV) and decreased conduction velocity (35 m/s).  The right Tibial (AHB) motor nerve showed reduced amplitude (1.07 mV).      The left sural sensory and the right sural sensory nerves showed no response.      All remaining nerves (as indicated in the following tables) were within normal limits.  Left vs. Right side comparison data for the Fibular (EDB) motor nerve indicates abnormal L-R amplitude difference (-84.3 %).  All remaining left vs. right side differences were within normal limits.      EMG showed positive sharp waves and fibrillations at the left tibialis anterior, EHL and peroneus longus muscles; otherwise, no muscle showed abnormal spontaneous activity.     Few polyphasic, and long-duration MUPs, were detected at the left tibialis anterior, EHL and peroneus longus muscles, with moderately to severely reduced recruitment patterns.     Otherwise, EMG of selected muscles in bilateral lower extremities was normal as tabulated below.          ___________________________  Gina Graff MD    Nerve Conduction Studies  Motor Sites      Latency Amplitude Neg. Dur Temperature Segment Delta-O Distance Velocity Comment   Site (ms) Norm (mV) Norm ms  C  ms cm m/s    Left Dp Branch Fibular (TA) Motor   Fib Head 3.0  <  4.2 2.8 > 5.0 13.4 29.9        Pop Fossa 6.7  < 5.7 1.66 - 9.6 29.9 Pop Fossa-Fib Head 3.7 12 32    Right Dp Branch Fibular (TA) Motor   Fib Head 3.5  < 4.2 3.8 > 5.0 16.8 30.2        Pop Fossa 5.8  < 5.7 3.9 - 15.8 30.1 Pop Fossa-Fib Head 2.3 9.5 41    Left Fibular (EDB) Motor   Ankle 5.1  < 6.0 0.55  > 2.0 11.6 28.7 Ankle-EDB  8     Bel Fib Head 13.8 - 0.28 - 10.6 29.2 Bel Fib Head-Ankle 8.7 31 36    Pop Fossa 18.4 - 0.27 - 10.4 29.4 Pop Fossa-Bel Fib Head 4.6 12 26    Right Fibular (EDB) Motor   Ankle 5.4  < 6.0 3.5  > 2.0 7.2 30 Ankle-EDB  8     Bel Fib Head 14.6 - 2.3 - 6.8 30.1 Bel Fib Head-Ankle 9.2 31.5 34    Pop Fossa 17.3 - 2.0 - 7.0 30.1 Pop Fossa-Bel Fib Head 2.7 9.5 35    Left Median (APB) Motor   Wrist 3.6  < 4.2 8.9  > 5.0 6.9 31 Wrist-APB  8     Elbow 9.0 - 8.3 - 7.4 32 Elbow-Wrist 5.4 27 50    Left Tibial (AHB) Motor   Ankle 5.9  < 6.5 1.11  > 4.4 8.2 29.7 Ankle-AHB  8     Knee 17.5 - 0.92 - 7.5 29.8 Knee-Ankle 11.6 41 35    Right Tibial (AHB) Motor   Ankle 4.7  < 6.5 1.07  > 4.4 6.8 30.2 Ankle-AHB  8     Knee 15.3 - 0.78 - 10.0 30.2 Knee-Ankle 10.6 41 39      L-R Sites      Latency Amplitude   Site L Lat. R Lat. L-R Lat. L-R Lat. L Amp. R Amp. L-R Amp L-R Amp   Dp Branch Fibular (TA) Motor   Fib Head 3.0 3.5 0.50 - 2.8 3.8 -26.3 -   Pop Fossa 6.7 5.8 0.90 - 1.66 3.9 -57.4 -   Fibular (EDB) Motor   Ankle 5.1 5.4 0.30 -1.60...1.60 0.55 3.5 -84.3 -61.0...61.0   Bel Fib Head 13.8 14.6 0.80 - 0.28 2.3 -87.8 -   Pop Fossa 18.4 17.3 1.10 - 0.27 2.0 -86.5 -   Median (APB) Motor   Wrist 3.6  - -0.70...0.70 8.9  - -54.0...54.0   Elbow 9.0  - - 8.3  - -   Tibial (AHB) Motor   Ankle 5.9 4.7 1.20 -1.40...1.40 1.11 1.07 -3.6 -50.0...50.0   Knee 17.5 15.3 2.2 - 0.92 0.78 -15.2 -     L-R Segments      Velocity   Segment L Smooth R Smooth L-R Smooth L-R Smooth   Dp Branch Fibular (TA) Motor   Pop Fossa-Fib Head 32 41 9 -   Fibular (EDB) Motor   Ankle-EDB    -   Bel Fib Head-Ankle 36 34 2 -8...8   Pop Fossa-Bel Fib Head 26 35 9  -19...19   Median (APB) Motor   Wrist-APB   - -   Elbow-Wrist 50  - -9...9   Tibial (AHB) Motor   Ankle-AHB    -   Knee-Ankle 35 39 4 -10...10     Sensory Sites      Onset Lat Amp (O-P) Temperature Segment Delta-O Distance Velocity Comment   Site ms  V Norm  C  ms cm m/s    Left Radial Sensory   Forearm-Wrist 2.2 36  > 15 30.6 Forearm-Wrist  10 45    Left Sural Sensory   Calf-Lat Mall NR NR  > 5 30.4 Calf-Lat Mall  14 NR    Right Sural Sensory   Calf-Lat Mall NR NR  > 5 30.4 Calf-Lat Mall  14 NR      L-R Sites      Peak Latency Peak Amplitude   Site L-Lat R-Lat L-R Lat L-R Lat L-Amp R-Amp L-R Amp L-R Amp   Radial Sensory   Forearm-Wrist 3.0  - - 28  - -   Sural Sensory   Calf-Lat Mall NR NR NR - NR NR NR -     L-R Segments      Velocity   Segment L Smooth R-Smooth L-R Smooth L-R Smooth   Radial Sensory   Forearm-Wrist 33  - -   Sural Sensory   Calf-Lat Mall NR NR NR -       Electromyography     Side Muscle Nerve Root Ins Act Fibs/PSW Fasc HF Amp Dur Poly Recrt Int Pat Comment   Left Biceps Fem SH Sciatic L5-S1 Nml Nml Nml 0 Nml Nml 0 Nml Nml    Left Ext Rain Long Fibular,  Deep Fibula... L5-S1 Incr 3+ Nml 0 Nml Nml 1+ Dis Nml    Left Fib Longus Fibular,  Superficial... L5-S1 Incr 3+ Nml 0 Nml Incr 2+ mildlyred Nml    Left Gastroc Tibial S1-S2 Nml Nml Nml 0 Nml Nml 0 Nml Nml    Left Gluteus Med Sup Gluteal L5-S1 Nml Nml Nml 0 Nml Nml 0 Nml Nml    Right Tib Anterior Fibular,  Deep Fibula... L4-L5 Nml Nml Nml 0 Nml Nml 0 Nml Nml    Left Tib Anterior Fibular,  Deep Fibula... L4-L5 Incr 2+ Nml 0 Nml Incr 2+ modyred Nml    Left Tib Posterior Tibial L5-S1 Nml Nml Nml 0 Nml Nml 0 Nml Nml    Left Vastus Lat Femoral L2-L4 Nml Nml Nml 0 Nml Nml 0 Nml Nml          NCS Waveforms:    Motor                         Sensory                       Again, thank you for allowing me to participate in the care of your patient.      Sincerely,    Gina Graff MD

## 2021-09-10 NOTE — PROGRESS NOTES
Incoming fax    Receieved on 09/10/21    From [facility]   INR 4.6       Macrina Avila RN, BSN, PHN

## 2021-09-13 ENCOUNTER — OFFICE VISIT (OUTPATIENT)
Dept: CARDIOLOGY | Facility: CLINIC | Age: 71
End: 2021-09-13
Payer: COMMERCIAL

## 2021-09-13 VITALS
SYSTOLIC BLOOD PRESSURE: 109 MMHG | BODY MASS INDEX: 19.74 KG/M2 | HEART RATE: 67 BPM | OXYGEN SATURATION: 99 % | WEIGHT: 141 LBS | DIASTOLIC BLOOD PRESSURE: 70 MMHG

## 2021-09-13 DIAGNOSIS — I42.8 NONISCHEMIC CARDIOMYOPATHY (H): ICD-10-CM

## 2021-09-13 DIAGNOSIS — I48.0 PAROXYSMAL ATRIAL FIBRILLATION (H): Primary | ICD-10-CM

## 2021-09-13 PROCEDURE — 99214 OFFICE O/P EST MOD 30 MIN: CPT | Mod: GC | Performed by: INTERNAL MEDICINE

## 2021-09-13 NOTE — PROGRESS NOTES
HPI: Haresh Rock is a 71 year old male admitted on 8/19/2021. 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    Since leaving the hospital, he reports feeling much worse overall since before his hospitalization. States he feels like he has no energy to do things and has to stop frequently due to fatigue. Doesn't feel GAY but notes some intermittent SOB that he feels more is just tiredness. States he has lost an additional 10 pounds and reports large amounts of urination daily. No chest pain, syncope. Does have presyncope.     PAST MEDICAL HISTORY:  Past Medical History:   Diagnosis Date     Abnormal dopamine scan (DaTSCAN) 2020 11/04/2020    [phone] 11/3/2020   Narrative & Impression   Examination: Nuclear medicine DATscan for Dopamine Receptor Localization.   Examination: NM Brain Image Tomographic (SPECT) DATscan   Date: 11/3/2020 3:21 PM   Indication: Parkinsonism   Comparison: None   Additional Information: none   Interfering Medications: None   Technique:   The patient initially received 1 ml of Lugol's solution orally prior     Antiphospholipid antibody syndrome (H) 2005    Ravi's     Antiphospholipid antibody syndrome (H)     Ravi's, noted negative per testing re-done in 2008 in hematology note 01/13/2009     Articulation disorder 09/23/2020     Atrial fibrillation (H) 04/2011     Benign prostatic hyperplasia with urinary retention      Cirrhosis (H)      CKD (chronic kidney disease) stage 2, GFR 60-89 ml/min      Diabetes mellitus type II     steroid induced     DJD (degenerative joint disease) of knee     SHAWN, worse on right     DVT (deep venous thrombosis) (H)      ED (erectile dysfunction)      Gallbladder  polyp 05/2010     Oheti-Kqxuvq-Vtga Disease 2007    BMT     Hemochromatosis      Hodgkin's disease NOS     5 cycles of ABVD in 2011     HTN (hypertension)      Hyperlipidaemia LDL goal <100      Multiple thyroid nodules     benign      Myeloproliferative disorder (H)     atypical, [facility]     Parkinson disease (H) 09/24/2020     PE (pulmonary embolism) 11/2004     Polyneuropathy      Primary pulmonary hypertension (H)      Thrombocytopenia (H) 06/08/2021       CURRENT MEDICATIONS:  Current Outpatient Medications   Medication Sig Dispense Refill     acetaminophen (TYLENOL) 500 MG tablet 500mg tab as needed       amoxicillin (AMOXIL) 500 MG capsule 4 x 500mg tabs by mouth 1 hour before dental appointments.       amoxicillin-clavulanate (AUGMENTIN) 875-125 MG tablet Take 1 tablet by mouth every 12 hours 4 tablet 0     azithromycin (ZITHROMAX) 250 MG tablet Take 1 tablet (250 mg) by mouth daily 2 tablet 0     bisacodyl (DULCOLAX) 5 MG EC tablet Refer to instructions sent via [employer]. (Patient taking differently: Take 5 mg by mouth daily Refer to instructions sent via [employer].) 4 tablet 0     calcium carbonate (TUMS) 500 MG chewable tablet 1 tab by mouth in morning or at night as needed       carbidopa-levodopa (SINEMET)  MG tablet Start with 1/2 tab daily x 3days then 1/2 tab twice daily for 3-7 days then 1 tab twice daily for one week then 1 tablet 3 times per day. The dose can be increased if there is no significant benefit. IT may take 1-3 weeks before benefit is seen  To lessen the effects that protein has on the effectiveness of sinemet (i.e.. Levodopa), it is best to take this medication on an empty stomach one hour before or two hours after meals.     If you are experiencing nausea with the medication, then you should take the medication with a CRACKER, GINGER TABLETS OR WITH FOOD. CALL IF STILL HAVING NAUSEA AS WE CAN TRY EXTRA CARBIDOPA, TRIMETHOBENZAMIDE ETC.     Besides nausea there are other side  effects including the rare occurrence of a rash to the yellow dye in the sinemet tablets if you are taking the 25/100 formulation.     Iron may interfere with the effectiveness of this medication so do not take iron supplements at the same time you are taking sinemet. 90 tablet 11     dapagliflozin (FARXIGA) 5 MG TABS tablet Take 1 tablet (5 mg) by mouth daily 30 tablet 0     docusate sodium (COLACE) 50 MG capsule Take 1 capsule (50 mg) by mouth 2 times daily as needed for constipation 30 capsule 0     dutasteride (AVODART) 0.5 MG capsule Take 1 capsule (0.5 mg) by mouth daily 90 capsule 3     flecainide (TAMBOCOR) 50 MG tablet Take 1 tablet (50 mg) by mouth 2 times daily 180 tablet 2     fluticasone (FLONASE) 50 MCG/ACT nasal spray Spray 1 spray into both nostrils daily as needed for rhinitis 16 g 11     loratadine (CLARITIN) 10 MG tablet Take 10 mg by mouth daily as needed        metoprolol succinate ER (TOPROL XL) 25 MG 24 hr tablet Take 1 tablet (25 mg) by mouth daily In the evening 90 tablet 3     multivitamin (CENTRUM SILVER) tablet Take 1 tablet by mouth daily        Nutritional Supplements (ENSURE PO)        polyethylene glycol (MIRALAX) 17 g packet Take 1 packet by mouth daily as needed for constipation       PREVIDENT 5000 DRY MOUTH 1.1 % GEL topical gel Take by mouth daily  3     rosuvastatin (CRESTOR) 40 MG tablet Take 1 tablet (40 mg) by mouth daily 30 tablet 0     sacubitril-valsartan (ENTRESTO) 24-26 MG per tablet Take 1 tablet by mouth 2 times daily 60 tablet 0     senna-docusate (SENOKOT-S/PERICOLACE) 8.6-50 MG tablet Take 2 tablets by mouth daily       vitamin D3 (CHOLECALCIFEROL) 50 mcg (2000 units) tablet Take 1 tablet (50 mcg) by mouth daily       warfarin ANTICOAGULANT (COUMADIN) 2.5 MG tablet Take 3.75 mg every Friday and 2.5 mg all other days or as directed by ACC. 90 tablet 0       PAST SURGICAL HISTORY:  Past Surgical History:   Procedure Laterality Date     ANESTHESIA CARDIOVERSION   4/21/2011    Procedure:ANESTHESIA CARDIOVERSION; Surgeon:GENERIC ANESTHESIA PROVIDER; Location:[facility] OR     ARTHROSCOPY KNEE RT/LT      LT     CATARACT IOL, RT/LT  2017     COLONOSCOPY  10/16/2012    Procedure: COLONOSCOPY;  Colonoscopy, screening;  Surgeon: Reg Feliciano MD;  Location: [facility] OR     COLONOSCOPY N/A 4/14/2021    Procedure: COLONOSCOPY;  Surgeon: Reg Holm MD;  Location: [facility] GI     ESOPHAGOSCOPY, GASTROSCOPY, DUODENOSCOPY (EGD), COMBINED N/A 10/7/2020    Procedure: ESOPHAGOGASTRODUODENOSCOPY, WITH BIOPSY;  Surgeon: Raul Sawyer DO;  Location: [facility] OR     H ABLATION FOCAL AFIB  3/5/2013    PVI     H ABLATION FOCAL AFIB  01/01/2018     HC BONE MARROW/STEM TRANSPLANT ALLOGENIC  5/8/2007     INSERT PORT VASCULAR ACCESS       JOINT REPLACEMTN, KNEE RT/LT  3/28/12    SHAWN     VASECTOMY  1990       ALLERGIES:     Allergies   Allergen Reactions     Seasonal Allergies        FAMILY HISTORY:  - Premature coronary artery disease  - Atrial fibrillation  - Sudden cardiac death     SOCIAL HISTORY:  Social History     Tobacco Use     Smoking status: Never Smoker     Smokeless tobacco: Never Used   Substance Use Topics     Alcohol use: No     Drug use: No       ROS:   Constitutional: No fever, chills, or sweats. Weight stable.   ENT: No visual disturbance, ear ache, epistaxis, sore throat.   Cardiovascular: As per HPI.   Respiratory: No cough, hemoptysis.    GI: No nausea, vomiting, hematemesis, melena, or hematochezia.   : No hematuria.   Integument: Negative.   Psychiatric: Negative.   Hematologic:  Easy bruising, no easy bleeding.  Neuro: Negative.   Endocrinology: No significant heat or cold intolerance   Musculoskeletal: No myalgia.    Exam:  There were no vitals taken for this visit.  GENERAL APPEARANCE: healthy, alert and no distress  HEENT: no icterus, no xanthelasmas, normal pupil size and reaction, normal palate, mucosa moist, no central cyanosis  NECK: no adenopathy, no asymmetry, masses, or  scars, thyroid normal to palpation and no bruits, JVP not elevated  RESPIRATORY: lungs clear to auscultation - no rales, rhonchi or wheezes, no use of accessory muscles, no retractions, respirations are unlabored, normal respiratory rate  CARDIOVASCULAR: regular rhythm, normal S1 with physiologic split S2, no S3 or S4 and no murmur, click or rub, precordium quiet with normal PMI.  ABDOMEN: soft, non tender, without hepatosplenomegaly, no masses palpable, bowel sounds normal, aorta not enlarged by palpation, no abdominal bruits  EXTREMITIES: peripheral pulses normal, no edema, no bruits  NEURO: alert and oriented to person/place/time, normal speech, gait and affect  VASC: Radial, femoral, dorsalis pedis and posterior tibialis pulses are normal in volumes and symmetric bilaterally. No bruits are heard.  SKIN: no ecchymoses, no rashes    Labs:  CBC RESULTS:   Lab Results   Component Value Date    WBC 8.9 08/20/2021    WBC 8.8 07/05/2021    RBC 4.34 (L) 08/20/2021    RBC 4.30 (L) 07/05/2021    HGB 13.7 08/20/2021    HGB 13.8 07/05/2021    HCT 39.9 (L) 08/20/2021    HCT 38.9 (L) 07/05/2021    MCV 92 08/20/2021    MCV 91 07/05/2021    MCH 31.6 08/20/2021    MCH 32.1 07/05/2021    MCHC 34.3 08/20/2021    MCHC 35.5 07/05/2021    RDW 12.7 08/20/2021    RDW 13.0 07/05/2021     (L) 08/20/2021     (L) 07/05/2021       BMP RESULTS:  Lab Results   Component Value Date     08/20/2021     07/05/2021    POTASSIUM 3.7 08/20/2021    POTASSIUM 3.3 (L) 07/05/2021    CHLORIDE 106 08/20/2021    CHLORIDE 103 07/05/2021    CO2 23 08/20/2021    CO2 28 07/05/2021    ANIONGAP 7 08/20/2021    ANIONGAP 4 07/05/2021    GLC 89 08/20/2021    GLC 97 07/05/2021    BUN 30 08/20/2021    BUN 25 07/05/2021    CR 0.99 08/20/2021    CR 1.15 07/05/2021    GFRESTIMATED 75 08/20/2021    GFRESTIMATED 63 07/05/2021    GFRESTBLACK 73 07/05/2021    GILBERT 8.0 (L) 08/20/2021    GILBERT 8.3 (L) 07/05/2021        INR RESULTS:  Lab Results    Component Value Date    INR 4.6 (A) 09/10/2021    INR 2.8 09/03/2021    INR 2.1 08/27/2021    INR 2.6 (A) 08/23/2021    INR 1.9 07/16/2021    INR 2.7 (A) 07/09/2021    INR 2.87 (H) 07/05/2021    INR 1.8 (A) 07/02/2021       Procedures:      Assessment and Plan:   Haresh Rock is a 71 year old male admitted on 8/19/2021. 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    #HFrEF (last EF 45-50%, 8/19/21) 2/2 NICM (though full ischemic eval not completed at this time)  Patient presenting with increased symptoms of lethargy and malaise since starting numerous cardiac meds while hospitalized, noting poor exercise tolerance (only able to do 15 min where prior he had been able to do 30+ minutes). Review of BPs with patient has noted him to be in low 100s to upper 90s with most SBPs and had to get IVF in clinic recently to help with BP. Would favor that this is likely d/t new medications in setting of his ongoing weight loss. Given this, would plan to back off on his current medication regimen as detailed below  BB: continue toprol succinate 25mg qday  ACEi/ARB/ARNI: stop entresto  Volume status: euvolemic; on no diuretic  SGLT2: stop farxiga (reports dehydration in setting of poor oral intake due to lack of appetite in setting of dysphagia)  -continue statin  -follow results of cMRI to complete ischemic eval    #Atrial fibrillation, paroxysmal  -rate control: continue metoprolol  -rhythm control: continue flecanide  -AC: continue warfarin; close INR monitoring in setting of weight loss    -we will see the patient back for close interval follow up in 4 weeks time to see his response to stopping these medications    The patient was  seen and discussed with Dr. Matt, who agrees with the above assessment and plan.    Nevin Angeles MD  Cardiology Fellow PGY4  P: [phone]      I have seen, interviewed, and examined patient. I have reviewed the laboratory tests, imaging, and other investigations. I have reviewed the management plan with the patient. I discussed with the team and agree with the findings and plan in this resident/fellow/nurse practitioner's note. In addition, changes in the physical examination, assessment and plan have been incorporated into the note by myself, as to make it a single cohesive document.       Bekah Matt MD, MS  Cardiology/Cardiac EP Attending Staff          CC  Patient Care Team:  Anya Nowak MD as PCP - General (Family Practice)  Augusto Miller MD as Referring Physician (Internal Medicine)  Jesusita Mejia PA-C as Physician Assistant  Pino Sharma MD as MD (Internal Medicine)  Fabi Jimenez MD as MD (INTERNAL MEDICINE - ENDOCRINOLOGY, DIABETES & METABOLISM)  Bekah Matt MD as MD (Cardiology)  Tina Mejia, RN as Specialty Care Coordinator (Cardiology)  Shanelle Yeager, KOSTAS as Specialty Care Coordinator (BMT - Adult)  Anya Nowak MD as Assigned PCP  Bekah Matt MD as Assigned Heart and Vascular Provider  Ángel Hill MD as Assigned Neuroscience Provider  Leslie Arredondo [facility] as Pharmacist (Pharmacist)  Rosalva Samuels MD as MD (Family Medicine)  Chelsey Crews MD as Assigned Gastroenterology Provider  Jose G Hawley MD as Assigned Surgical Provider  Bekah Matt MD as MD (Clinical Cardiac Electrophysiology)  SELF, REFERRED

## 2021-09-13 NOTE — NURSING NOTE
"Chief Complaint   [facility]     [facility]        Initial /70 (BP Location: Right arm, Patient Position: Chair, Cuff Size: Adult Regular)   Pulse 67   Wt 64 kg (141 lb)   SpO2 99%   BMI 19.74 kg/m   Estimated body mass index is 19.74 kg/m  as calculated from the following:    Height as of 8/30/21: 1.8 m (5' 10.87\").    Weight as of this encounter: 64 kg (141 lb)..  BP completed using cuff size: regular    Annabelle Viera MA  "

## 2021-09-13 NOTE — LETTER
9/13/2021      RE: Haresh Rock  [address] [facility] [address]       Dear Colleague,    Thank you for the opportunity to participate in the care of your patient, Haresh Rock, at the [facility]. Please see a copy of my visit note below.    HPI: Haresh Rock is a 71 year old male admitted on 8/19/2021. 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    Since leaving the hospital, he reports feeling much worse overall since before his hospitalization. States he feels like he has no energy to do things and has to stop frequently due to fatigue. Doesn't feel GAY but notes some intermittent SOB that he feels more is just tiredness. States he has lost an additional 10 pounds and reports large amounts of urination daily. No chest pain, syncope. Does have presyncope.     PAST MEDICAL HISTORY:  Past Medical History:   Diagnosis Date     Abnormal dopamine scan (DaTSCAN) 2020 11/04/2020    [phone] 11/3/2020   Narrative & Impression   Examination: Nuclear medicine DATscan for Dopamine Receptor Localization.   Examination: NM Brain Image Tomographic (SPECT) DATscan   Date: 11/3/2020 3:21 PM   Indication: Parkinsonism   Comparison: None   Additional Information: none   Interfering Medications: None   Technique:   The patient initially received 1 ml of Lugol's solution orally prior     Antiphospholipid antibody syndrome (H) 2005    Ravi's     Antiphospholipid antibody syndrome (H)     Ravi's, noted negative per testing re-done in 2008 in hematology note 01/13/2009     Articulation disorder 09/23/2020     Atrial fibrillation (H) 04/2011      Benign prostatic hyperplasia with urinary retention      Cirrhosis (H)      CKD (chronic kidney disease) stage 2, GFR 60-89 ml/min      Diabetes mellitus type II     steroid induced     DJD (degenerative joint disease) of knee     SHAWN, worse on right     DVT (deep venous thrombosis) (H)      ED (erectile dysfunction)      Gallbladder polyp 05/2010     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     Hemochromatosis      Hodgkin's disease NOS     5 cycles of ABVD in 2011     HTN (hypertension)      Hyperlipidaemia LDL goal <100      Multiple thyroid nodules     benign      Myeloproliferative disorder (H)     atypical, [facility]     Parkinson disease (H) 09/24/2020     PE (pulmonary embolism) 11/2004     Polyneuropathy      Primary pulmonary hypertension (H)      Thrombocytopenia (H) 06/08/2021       CURRENT MEDICATIONS:  Current Outpatient Medications   Medication Sig Dispense Refill     acetaminophen (TYLENOL) 500 MG tablet 500mg tab as needed       amoxicillin (AMOXIL) 500 MG capsule 4 x 500mg tabs by mouth 1 hour before dental appointments.       amoxicillin-clavulanate (AUGMENTIN) 875-125 MG tablet Take 1 tablet by mouth every 12 hours 4 tablet 0     azithromycin (ZITHROMAX) 250 MG tablet Take 1 tablet (250 mg) by mouth daily 2 tablet 0     bisacodyl (DULCOLAX) 5 MG EC tablet Refer to instructions sent via [employer] Android POS. (Patient taking differently: Take 5 mg by mouth daily Refer to instructions sent via [employer] Android POS.) 4 tablet 0     calcium carbonate (TUMS) 500 MG chewable tablet 1 tab by mouth in morning or at night as needed       carbidopa-levodopa (SINEMET)  MG tablet Start with 1/2 tab daily x 3days then 1/2 tab twice daily for 3-7 days then 1 tab twice daily for one week then 1 tablet 3 times per day. The dose can be increased if there is no significant benefit. IT may take 1-3 weeks before benefit is seen  To lessen the effects that protein has on the effectiveness of sinemet (i.e.. Levodopa), it is best to take  this medication on an empty stomach one hour before or two hours after meals.     If you are experiencing nausea with the medication, then you should take the medication with a CRACKER, GINGER TABLETS OR WITH FOOD. CALL IF STILL HAVING NAUSEA AS WE CAN TRY EXTRA CARBIDOPA, TRIMETHOBENZAMIDE ETC.     Besides nausea there are other side effects including the rare occurrence of a rash to the yellow dye in the sinemet tablets if you are taking the 25/100 formulation.     Iron may interfere with the effectiveness of this medication so do not take iron supplements at the same time you are taking sinemet. 90 tablet 11     dapagliflozin (FARXIGA) 5 MG TABS tablet Take 1 tablet (5 mg) by mouth daily 30 tablet 0     docusate sodium (COLACE) 50 MG capsule Take 1 capsule (50 mg) by mouth 2 times daily as needed for constipation 30 capsule 0     dutasteride (AVODART) 0.5 MG capsule Take 1 capsule (0.5 mg) by mouth daily 90 capsule 3     flecainide (TAMBOCOR) 50 MG tablet Take 1 tablet (50 mg) by mouth 2 times daily 180 tablet 2     fluticasone (FLONASE) 50 MCG/ACT nasal spray Spray 1 spray into both nostrils daily as needed for rhinitis 16 g 11     loratadine (CLARITIN) 10 MG tablet Take 10 mg by mouth daily as needed        metoprolol succinate ER (TOPROL XL) 25 MG 24 hr tablet Take 1 tablet (25 mg) by mouth daily In the evening 90 tablet 3     multivitamin (CENTRUM SILVER) tablet Take 1 tablet by mouth daily        Nutritional Supplements (ENSURE PO)        polyethylene glycol (MIRALAX) 17 g packet Take 1 packet by mouth daily as needed for constipation       PREVIDENT 5000 DRY MOUTH 1.1 % GEL topical gel Take by mouth daily  3     rosuvastatin (CRESTOR) 40 MG tablet Take 1 tablet (40 mg) by mouth daily 30 tablet 0     sacubitril-valsartan (ENTRESTO) 24-26 MG per tablet Take 1 tablet by mouth 2 times daily 60 tablet 0     senna-docusate (SENOKOT-S/PERICOLACE) 8.6-50 MG tablet Take 2 tablets by mouth daily       vitamin D3  (CHOLECALCIFEROL) 50 mcg (2000 units) tablet Take 1 tablet (50 mcg) by mouth daily       warfarin ANTICOAGULANT (COUMADIN) 2.5 MG tablet Take 3.75 mg every Friday and 2.5 mg all other days or as directed by [facility]. 90 tablet 0       PAST SURGICAL HISTORY:  Past Surgical History:   Procedure Laterality Date     ANESTHESIA CARDIOVERSION  4/21/2011    Procedure:ANESTHESIA CARDIOVERSION; Surgeon:GENERIC ANESTHESIA PROVIDER; Location:[facility] OR     ARTHROSCOPY KNEE RT/LT      LT     CATARACT IOL, RT/LT  2017     COLONOSCOPY  10/16/2012    Procedure: COLONOSCOPY;  Colonoscopy, screening;  Surgeon: Reg Feliciano MD;  Location: [facility] OR     COLONOSCOPY N/A 4/14/2021    Procedure: COLONOSCOPY;  Surgeon: Reg Holm MD;  Location: [facility] GI     ESOPHAGOSCOPY, GASTROSCOPY, DUODENOSCOPY (EGD), COMBINED N/A 10/7/2020    Procedure: ESOPHAGOGASTRODUODENOSCOPY, WITH BIOPSY;  Surgeon: Raul Sawyer DO;  Location: [facility] OR     H ABLATION FOCAL AFIB  3/5/2013    PVI     H ABLATION FOCAL AFIB  01/01/2018     HC BONE MARROW/STEM TRANSPLANT ALLOGENIC  5/8/2007     INSERT PORT VASCULAR ACCESS       JOINT REPLACEMTN, KNEE RT/LT  3/28/12    SHAWN     VASECTOMY  1990       ALLERGIES:     Allergies   Allergen Reactions     Seasonal Allergies        FAMILY HISTORY:  - Premature coronary artery disease  - Atrial fibrillation  - Sudden cardiac death     SOCIAL HISTORY:  Social History     Tobacco Use     Smoking status: Never Smoker     Smokeless tobacco: Never Used   Substance Use Topics     Alcohol use: No     Drug use: No       ROS:   Constitutional: No fever, chills, or sweats. Weight stable.   ENT: No visual disturbance, ear ache, epistaxis, sore throat.   Cardiovascular: As per HPI.   Respiratory: No cough, hemoptysis.    GI: No nausea, vomiting, hematemesis, melena, or hematochezia.   : No hematuria.   Integument: Negative.   Psychiatric: Negative.   Hematologic:  Easy bruising, no easy bleeding.  Neuro: Negative.   Endocrinology: No  significant heat or cold intolerance   Musculoskeletal: No myalgia.    Exam:  There were no vitals taken for this visit.  GENERAL APPEARANCE: healthy, alert and no distress  HEENT: no icterus, no xanthelasmas, normal pupil size and reaction, normal palate, mucosa moist, no central cyanosis  NECK: no adenopathy, no asymmetry, masses, or scars, thyroid normal to palpation and no bruits, JVP not elevated  RESPIRATORY: lungs clear to auscultation - no rales, rhonchi or wheezes, no use of accessory muscles, no retractions, respirations are unlabored, normal respiratory rate  CARDIOVASCULAR: regular rhythm, normal S1 with physiologic split S2, no S3 or S4 and no murmur, click or rub, precordium quiet with normal PMI.  ABDOMEN: soft, non tender, without hepatosplenomegaly, no masses palpable, bowel sounds normal, aorta not enlarged by palpation, no abdominal bruits  EXTREMITIES: peripheral pulses normal, no edema, no bruits  NEURO: alert and oriented to person/place/time, normal speech, gait and affect  VASC: Radial, femoral, dorsalis pedis and posterior tibialis pulses are normal in volumes and symmetric bilaterally. No bruits are heard.  SKIN: no ecchymoses, no rashes    Labs:  CBC RESULTS:   Lab Results   Component Value Date    WBC 8.9 08/20/2021    WBC 8.8 07/05/2021    RBC 4.34 (L) 08/20/2021    RBC 4.30 (L) 07/05/2021    HGB 13.7 08/20/2021    HGB 13.8 07/05/2021    HCT 39.9 (L) 08/20/2021    HCT 38.9 (L) 07/05/2021    MCV 92 08/20/2021    MCV 91 07/05/2021    MCH 31.6 08/20/2021    MCH 32.1 07/05/2021    MCHC 34.3 08/20/2021    MCHC 35.5 07/05/2021    RDW 12.7 08/20/2021    RDW 13.0 07/05/2021     (L) 08/20/2021     (L) 07/05/2021       BMP RESULTS:  Lab Results   Component Value Date     08/20/2021     07/05/2021    POTASSIUM 3.7 08/20/2021    POTASSIUM 3.3 (L) 07/05/2021    CHLORIDE 106 08/20/2021    CHLORIDE 103 07/05/2021    CO2 23 08/20/2021    CO2 28 07/05/2021    ANIONGAP 7  08/20/2021    ANIONGAP 4 07/05/2021    GLC 89 08/20/2021    GLC 97 07/05/2021    BUN 30 08/20/2021    BUN 25 07/05/2021    CR 0.99 08/20/2021    CR 1.15 07/05/2021    GFRESTIMATED 75 08/20/2021    GFRESTIMATED 63 07/05/2021    GFRESTBLACK 73 07/05/2021    GILBERT 8.0 (L) 08/20/2021    GILBERT 8.3 (L) 07/05/2021        INR RESULTS:  Lab Results   Component Value Date    INR 4.6 (A) 09/10/2021    INR 2.8 09/03/2021    INR 2.1 08/27/2021    INR 2.6 (A) 08/23/2021    INR 1.9 07/16/2021    INR 2.7 (A) 07/09/2021    INR 2.87 (H) 07/05/2021    INR 1.8 (A) 07/02/2021       Procedures:      Assessment and Plan:   Haresh Rock is a 71 year old male admitted on 8/19/2021. 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    #HFrEF (last EF 45-50%, 8/19/21) 2/2 NICM (though full ischemic eval not completed at this time)  Patient presenting with increased symptoms of lethargy and malaise since starting numerous cardiac meds while hospitalized, noting poor exercise tolerance (only able to do 15 min where prior he had been able to do 30+ minutes). Review of BPs with patient has noted him to be in low 100s to upper 90s with most SBPs and had to get IVF in clinic recently to help with BP. Would favor that this is likely d/t new medications in setting of his ongoing weight loss. Given this, would plan to back off on his current medication regimen as detailed below  BB: continue toprol succinate 25mg qday  ACEi/ARB/ARNI: stop entresto  Volume status: euvolemic; on no diuretic  SGLT2: stop farxiga (reports dehydration in setting of poor oral intake due to lack of appetite in setting of dysphagia)  -continue statin  -follow results of cMRI to complete  ischemic eval    #Atrial fibrillation, paroxysmal  -rate control: continue metoprolol  -rhythm control: continue flecanide  -AC: continue warfarin; close INR monitoring in setting of weight loss    -we will see the patient back for close interval follow up in 4 weeks time to see his response to stopping these medications    The patient was seen and discussed with Dr. Matt, who agrees with the above assessment and plan.    Nevin Angeles MD  Cardiology Fellow PGY4  P: [phone]      I have seen, interviewed, and examined patient. I have reviewed the laboratory tests, imaging, and other investigations. I have reviewed the management plan with the patient. I discussed with the team and agree with the findings and plan in this resident/fellow/nurse practitioner's note. In addition, changes in the physical examination, assessment and plan have been incorporated into the note by myself, as to make it a single cohesive document.       Bekah Matt MD, MS  Cardiology/Cardiac EP Attending Staff      CC  Patient Care Team:  Anya Nowak MD as PCP - General (Family Practice)  Augusto Miller MD as Referring Physician (Internal Medicine)  Jesusita Mejia PA-C as Physician Assistant  Pino Sharma MD as MD (Internal Medicine)  Fabi Jimenez MD as MD (INTERNAL MEDICINE - ENDOCRINOLOGY, DIABETES & METABOLISM)  Tina Mejia, RN as Specialty Care Coordinator (Cardiology)  Shanelle Yeager, KOSTAS as Specialty Care Coordinator (BMT - Adult)  Anya Nowak MD as Assigned PCP  Ángel Hill MD as Assigned Neuroscience Provider  Leslie Arredondo [facility] as Pharmacist (Pharmacist)  Rosalva Samuels MD as MD (Family Medicine)  Chelsey Crews MD as Assigned Gastroenterology Provider  Jose G Hawley MD as Assigned Surgical Provider

## 2021-09-13 NOTE — PATIENT INSTRUCTIONS
Thank you for coming to the [facility] [place]; please note the following instructions:    1. STOP Entresto    2.  STOP Farxiga    3.  Follow up in 1 month        If you have any questions regarding your visit please contact your care team:     Cardiology  Telephone Number   Tiffany PÉREZ, RN  Salma KELSEY, RN   Mandi RAMIREZ, RMCHRYSTAL NEFF, RMA  Kevin RUFFIN, LPN   [phone] (option 1)   For scheduling appts:     [phone] (select option 1)       For the Device Clinic (Pacemakers and ICD's)  RN's :  Chelsey Carrera   During business hours: [phone]    *After business hours:  [phone] (select option 4)      Normal test result notifications will be released via [employer] or mailed within 7 business days.  All other test results, will be communicated via telephone once reviewed by your cardiologist.    If you need a medication refill please contact your pharmacy.  Please allow 3 business days for your refill to be completed.    As always, thank you for trusting us with your health care needs!

## 2021-09-14 ENCOUNTER — TELEPHONE (OUTPATIENT)
Dept: PHYSICAL THERAPY | Facility: CLINIC | Age: 71
End: 2021-09-14

## 2021-09-14 DIAGNOSIS — D68.61 ANTIPHOSPHOLIPID ANTIBODY SYNDROME (H): ICD-10-CM

## 2021-09-15 ENCOUNTER — THERAPY VISIT (OUTPATIENT)
Dept: SPEECH THERAPY | Facility: CLINIC | Age: 71
End: 2021-09-15
Attending: FAMILY MEDICINE
Payer: COMMERCIAL

## 2021-09-15 ENCOUNTER — ANCILLARY PROCEDURE (OUTPATIENT)
Dept: GENERAL RADIOLOGY | Facility: CLINIC | Age: 71
End: 2021-09-15
Attending: FAMILY MEDICINE
Payer: COMMERCIAL

## 2021-09-15 ENCOUNTER — TELEPHONE (OUTPATIENT)
Dept: FAMILY MEDICINE | Facility: CLINIC | Age: 71
End: 2021-09-15

## 2021-09-15 DIAGNOSIS — G20.A1 PARKINSON DISEASE (H): ICD-10-CM

## 2021-09-15 DIAGNOSIS — R13.10 DYSPHAGIA, UNSPECIFIED TYPE: ICD-10-CM

## 2021-09-15 PROCEDURE — 74230 X-RAY XM SWLNG FUNCJ C+: CPT | Mod: GC | Performed by: RADIOLOGY

## 2021-09-15 PROCEDURE — 92610 EVALUATE SWALLOWING FUNCTION: CPT | Mod: GN | Performed by: SPEECH-LANGUAGE PATHOLOGIST

## 2021-09-15 PROCEDURE — 92611 MOTION FLUOROSCOPY/SWALLOW: CPT | Mod: GN | Performed by: SPEECH-LANGUAGE PATHOLOGIST

## 2021-09-15 RX ORDER — BARIUM SULFATE 400 MG/ML
50 SUSPENSION ORAL ONCE
Status: COMPLETED | OUTPATIENT
Start: 2021-09-15 | End: 2021-09-15

## 2021-09-15 RX ORDER — WARFARIN SODIUM 2.5 MG/1
TABLET ORAL
Qty: 90 TABLET | Refills: 3 | Status: ON HOLD | OUTPATIENT
Start: 2021-09-15 | End: 2021-11-04

## 2021-09-15 RX ORDER — BARIUM SULFATE 400 MG/ML
20 SUSPENSION ORAL ONCE
Status: COMPLETED | OUTPATIENT
Start: 2021-09-15 | End: 2021-09-15

## 2021-09-15 RX ADMIN — BARIUM SULFATE 20 ML: 400 SUSPENSION ORAL at 13:27

## 2021-09-15 RX ADMIN — BARIUM SULFATE 50 ML: 400 SUSPENSION ORAL at 13:26

## 2021-09-15 NOTE — TELEPHONE ENCOUNTER
"Requested Prescriptions   Pending Prescriptions Disp Refills     warfarin ANTICOAGULANT (COUMADIN) 2.5 MG tablet [Pharmacy Med Name: WARFARIN SODIUM 2.5 MG TABLET] 90 tablet 0     Sig: TAKE 3.75 MG EVERY FRIDAY AND 2.5 MG ALL OTHER DAYS OR AS DIRECTED BY [facility].       Vitamin K Antagonists Failed - 9/14/2021 12:32 PM        Failed - INR is within goal in the past 6 weeks     Confirm INR is within goal in the past 6 weeks.     Recent Labs   Lab Test 09/10/21  1306   INR 4.6*                       Passed - Recent (12 mo) or future (30 days) visit within the authorizing provider's specialty     Patient has had an office visit with the authorizing provider or a provider within the authorizing providers department within the previous 12 mos or has a future within next 30 days. See \"Patient Info\" tab in inbasket, or \"Choose Columns\" in Meds & Orders section of the refill encounter.              Passed - Medication is active on med list        Passed - Patient is 18 years of age or older           "

## 2021-09-16 DIAGNOSIS — I50.32 CHRONIC DIASTOLIC HEART FAILURE (H): Primary | ICD-10-CM

## 2021-09-16 NOTE — PROGRESS NOTES
Speech-Language Pathology Department   EVALUATION  [facility]  VIDEO SWALLOW STUDY RESULTS      09/15/21 1300       Present No   General Information   Type Of Visit Initial   Start Of Care Date 09/15/21   Referring Physician Jose Manuel Gallegos MD   Orders Evaluate And Treat   Orders Comment Video swallow study    Medical Diagnosis Dysphagia, PD    Onset Of Illness/injury Or Date Of Surgery 08/30/21   Precautions/limitations Swallowing Precautions   Hearing WFL for conversational speech production    Pertinent History of Current Problem/OT: Additional Occupational Profile Info Haresh Rock is a 71-year-old male with a PMH significant for PD, CKD, DVT, A. fib ablation, antiphospholipid antibody syndrome, Hodgkin's lymphoma , and dysphagia with a recent admission for aspiration PNA.  Pt is known to this provider and the OP SLP caseload, please see dysphagia hx for further information.  Per most recent VFSS completed 3/23/21, pt demonstrated silent aspiration during and after the swallow with thin and nectar thick liquids. A supraglottic swallow strategy was not effective at fully clearing aspirated residuals with thin liquids, however pt was able to mostly clear aspirated residuals with supraglottic swallow with nectar thick liquids. Pt also demonstrated moderate-severe pharyngeal residuals. Dysphagia diet 2 and thin liquids with supraglottic swallow recommended at that time. Pt was followed by SLP during his recent admission and discharged with the recommendation for minced and moist diet (5) with mildly thick liquids (2) and strict use of supraglottic swallow strategy (swallow, cough, swallow) with PO intake. A video swallow study was not completed at that time which resulted in today's referral for a video swallow study.  Pt reports since his discharge that he has been thickening his liquids to nectar consistency but endorses ongoing  coughing with intake which he describes as a habit.  Pt reports to eat slowly and describes that a microwave meal can take up to 45 minutes to consume.  Pt further reports that he has little appetite, and that food does not taste particularly good.  Pt notes a 10 lb weight loss since hospitalization in August.  No history of reflux.     Respiratory Status Room air   Prior Level Of Function Swallowing   Prior Level Of Function Comment Minced, moist and nectar thick liquids    Patient Role/employment History Retired   Living Environment House/[facility]   General Observations Pt is pleasant and cooperative.    Patient/family Goals Goals not formally stated at the time of evaluation.    Clinical Swallow Evaluation   Oral Musculature generally intact   Structural Abnormalities none present   Dentition present and adequate   Secretion Management problems swallowing secretions   Mucosal Quality adequate   Mandibular Strength and Mobility intact   Oral Labial Strength and Mobility WFL;other (see comments)  (Slowed )   Lingual Strength and Mobility WFL;other (see comments)  (movement slowed)   Laryngeal Function Cough;Throat clear;Swallow;Voicing initiated  (Cough and throat clearing non-productive )   Oral Musculature Comments Cul de sac resonance    Additional Documentation Yes   Additional evaluation(s) completed today Yes   Rationale for completing additional evaluation View pharyngeal phase and r/o aspiration.    Clinical Swallow Eval: Thin Liquid Texture Trial   Mode of Presentation, Thin Liquids cup;self-fed   Volume of Liquid or Food Presented single sip x1    Oral Phase of Swallow WFL   Pharyngeal Phase of Swallow reduction in laryngeal movement;repeated swallows;impaired;throat clearing   Diagnostic Statement Cough elicited but pt has history of implementation of supraglottic swallow.      VFSS Evaluation   VFSS Additional Documentation Yes   VFSS Eval: Radiology   Radiologist [facility] Radiology Resident     Views Taken left lateral;A/P   Physical Location of Procedure [facility] Radiology #2    VFSS Eval: Thin Liquid Texture Trial   Mode of Presentation, Thin Liquid cup;self-fed   Order of Presentation 1   Preparatory Phase WFL   Oral Phase, Thin Liquid Premature pharyngeal entry   Pharyngeal Phase, Thin Liquid Residue in pyriform sinus;Residue in valleculae;Pharyngeal wall coating;Delayed swallow reflex   Rosenbek's Penetration Aspiration Scale: Thin Liquid Trial Results 8 - contrast passes glottis, visible subglottic residue remains, absent patient response (aspiration)   Response to Aspiration absent response, silent aspiration   Diagnostic Statement Premature spillage with swallow delayed to the pyriforms.  Swallow executed with reduced BOT contraction, poor epiglottic inversion, and limited constriction of the pharyngeal wall.  Keegan silent aspiration during the swallow with residue remaining following the swallow repsonse.  Osteophyte noted at C5.     VFSS Eval: Slightly Thick Liquids   Mode of Presentation cup;self-fed   Order of Presentation 2   Preparatory Phase WFL   Oral Phase Premature pharyngeal entry   Pharyngeal Phase Delayed swallow reflex;Pharyngeal wall coating;Residue in valleculae;Residue in pyriform sinus   Rosenbek's Penetration Aspiration Scale 8 - contrast passes glottis, visable residue remains, absent patient response   Diagnostic Statement Premature spillage with swallow delayed to the pyriforms.  Swallow executed with reduced BOT contraction, poor epiglottic inversion, and limited constriction of the pharyngeal wall.  Keegan silent aspiration during the swallow with residue remaining following the swallow repsonse.     VFSS Eval: Mildly Thick Liquids    Mode of Presentation cup;self-fed   Order of Presentation 3, 4, 5, 11, 14   Preparatory Phase WFL   Oral Phase Premature pharyngeal entry;Poor AP movement;other (see comments)  (Tongue pumping )   Pharyngeal Phase Delayed swallow  reflex;Pharyngeal wall coating;Residue in valleculae;Residue in pyriform sinus   Rosenbek's Penetration Aspiration Scale 7 - contrast passes glottis, visible subglottic residue remains despite patient's response (aspiration)   Successful Strategies Trialed During Procedure other (see comments)  (chin tuck, supraglottic swallow not effective )   Diagnostic Statement Premature spillage with swallow executed beyond the tip of the epiglottis.  Swallow executed with reduced BOT contraction, poor epiglottic inversion, and limited constriction of the pharyngeal wall.  Keegan aspiration during the swallow with residue remaining following the swallow repsonse resulting in a weak, non-productive cough.     VFSS Eval: Moderately Thick Liquids    Mode of Presentation cup;self-fed   Order of Presentation 6, 7   Preparatory Phase WFL   Oral Phase WFL   Pharyngeal Phase Pharyngeal wall coating;Residue in valleculae;Residue in pyriform sinus   Rosenbek's Penetration Aspiration Scale 7 - contrast passes glottis, visible subglottic residue remains despite patient's response (aspiration)   Diagnostic Statement Aspiration noted but difficult to discren if aspiration occuring on presented textures or residuals from prior trials.     VFSS Eval: Puree Solid Texture Trial   Mode of Presentation, Puree spoon;self-fed   Order of Presentation 8, 15   Preparatory Phase Poor bolus control   Oral Phase, Puree Poor AP movement   Pharyngeal Phase, Puree Residue in valleculae;Residue in pyriform sinus;Pharyngeal wall coating   Rosenbek's Penetration Aspiration Scale: Puree Food Trial Results 2 - contrast enters airway, remains above the vocal cords, no residue remains (penetration)   Diagnostic Statement Reduced bolus formation/control.  Weakness through the BOT and pharyngeal walls.  Significant residue remaining through the pharynx.  Deep penetration of residuals from prior trials.  In AP view, a L sided preference.     VFSS Eval: Regular Texture  Trial (Solid)   Mode of Presentation self-fed   Order of Presentation 9, 10, 12 & 13 (barium tablet)    Preparatory Phase WFL   Oral Phase Poor AP movement;Premature pharyngeal entry   Pharyngeal Phase Residue in valleculae;Residue in pyriform sinus  (Lateral chanels )   Rosenbek's Penetration Aspiration Scale 2 - contrast enters airway, remains above the vocal cords, no residue remains (penetration)   Swallow Compensations   Swallow Compensations Pacing;Reduce amounts;Alternate viscosity of consistencies   Results Aspiration   Educational Assessment   Barriers to Learning Cognitive   Esophageal Phase of Swallow   Patient reports or presents with symptoms of esophageal dysphagia No   Swallow Eval: Clinical Impressions   Skilled Criteria for Therapy Intervention No significant expected  improvement in functional status   Functional Assessment Scale (FAS) 1   Dysphagia Outcome Severity Scale (KATHRYN) Level 1 - KATHRYN   Treatment Diagnosis Severe oropharyngeal dysphagia    Diet texture recommendations   (PEG with PO for comfort vs. liberalization with goals of car)   Recommended Feeding/Eating Techniques alternate between small bites and sips of food/liquid;maintain upright posture during/after eating for 30 mins;small sips/bites;other (see comments)  (Remain active, frequent oral cares)   Rehab Potential fair, will monitor progress closely   Anticipated Discharge Disposition home   Risks and Benefits of Treatment have been explained. Yes   Patient, family and/or staff in agreement with Plan of Care Yes   Clinical Impression Comments Pt presents with severe oropharyngeal dysphagia under flourosocopy.  Oral mechanism exam noted for slowed movements, cul-de-sac resonance, reduced vocal volume, and non-productive cough/throat clearing.  Swallow executed with premature pharyngeal entry and reduced AP movement.  Reduced BOT contraction, reduced epiglottic inversion, and limited constriction of the pharyngeal wall was observed.   Keegan aspiration of thin liquids, slightly thick liquids, and mildly thick liquids was observed.  Intermittent cough/throat clearing elicited in response to aspiration of nectar consistencies, but it was ineffective in clearing the aspirated material.  Residue remained in the valleculae, pyrifoms, and pharyngeal wall; this residue was observed to penetrate the airway and ultimately resulted in aspiration.  Moderately thick liquids revealed no aspiration, but pharyngeal residue was increased with minimal amounts passing through to the UES.  Pt is at risk for aspiration during and after the swallow response.      These results are concerning and when compared to prior studies and demonstrate a progression of dysphagia.  Pt previously completed a course of dysphagia intervention resulting in little change to his swallow response, This study, diagnosis of PD, and recent aspiration PNA reveal that this pt is at ongoing elevated risk for aspiration and its related complications.  Pt also demonstrates risk for nutritional compromise given evidence of weight loss, lack of appetite, report of poor taste/pleasure with intake despite modifications.  Due to these concerns, a PEG tube should be considered with limited PO for comfort.  Pt will need to aggressively complete oral cares.  Should pt not wish to pursue PEG placement, consideration should be made to fully liberalize diet and address goals of care.      Pt was educated on today's results and recommendations.  Pt reported understanding and agreement with findings.  Further intervention is not indicated at this time due to limited expectation of functional progress.     Total Session Time   SLP Eval: oral/pharyngeal swallow function, clinical minutes (60242) 15   SLP Eval: VideoFluoroscopic Swallow function Minutes (11482) 30   Total Evaluation Time 45     Thank you for the referral of Haresh Rock.  If you have any questions about this report, please contact me using  the information below.         Linda Anthony MS CCC-SLP    Speech Language Pathologist   Clinical Specialist Level 1   Rehabilitation Services  [facility] [address] [place] [address]  Office: [phone]  [email or web address]

## 2021-09-16 NOTE — TELEPHONE ENCOUNTER
Patient notified of provider message as written. Patient verbalized understanding. Appointment scheduled    Karin Alfonso RN

## 2021-09-16 NOTE — TELEPHONE ENCOUNTER
Please call patient: your recent test results show worsening of your swallowing problem. Let's talk about next steps at a virtual appointment.     Anya Nowak MD

## 2021-09-17 ENCOUNTER — TRANSFERRED RECORDS (OUTPATIENT)
Dept: HEALTH INFORMATION MANAGEMENT | Facility: CLINIC | Age: 71
End: 2021-09-17

## 2021-09-17 ENCOUNTER — ANTICOAGULATION THERAPY VISIT (OUTPATIENT)
Dept: FAMILY MEDICINE | Facility: CLINIC | Age: 71
End: 2021-09-17

## 2021-09-17 DIAGNOSIS — I48.91 ATRIAL FIBRILLATION (H): ICD-10-CM

## 2021-09-17 DIAGNOSIS — Z79.01 LONG TERM CURRENT USE OF ANTICOAGULANT THERAPY: Primary | ICD-10-CM

## 2021-09-17 DIAGNOSIS — I48.0 PAROXYSMAL ATRIAL FIBRILLATION (H): ICD-10-CM

## 2021-09-17 LAB — INR (EXTERNAL): 2.5 (ref 0.9–1.1)

## 2021-09-17 NOTE — PROGRESS NOTES
ANTICOAGULATION MANAGEMENT     Haresh Rock 71 year old male is on warfarin with therapeutic INR result. (Goal INR 2.0-3.0)    Recent labs: (last 7 days)     09/17/21  1236   INR 2.5*       ASSESSMENT     Source(s): Chart Review and Patient/Caregiver Call       Warfarin doses taken: Warfarin taken as instructed    Diet: No new diet changes identified    New illness, injury, or hospitalization: No    Medication/supplement changes: None noted    Signs or symptoms of bleeding or clotting: No    Previous INR: Supratherapeutic    Additional findings: None     PLAN     Recommended plan for no diet, medication or health factor changes affecting INR     Dosing Instructions: Continue your current warfarin dose with next INR in 1 week       Summary  As of 9/17/2021    Full warfarin instructions:  3.75 mg every Fri; 2.5 mg all other days   Next INR check:  9/24/2021             Telephone call with Haresh who verbalizes understanding and agrees to plan    Patient to recheck with home meter    Education provided: Goal range and significance of current result, Importance of therapeutic range and Contact [phone]  with any changes, questions or concerns.     Plan made per [facility] anticoagulation protocol    Martín Rico RN  Anticoagulation Clinic  9/17/2021    _______________________________________________________________________     Anticoagulation Episode Summary     Current INR goal:  2.0-3.0   TTR:  58.4 % (1 y)   Target end date:  Indefinite   Send INR reminders to:  CHELSEA CHILEL    Indications    Long-term (current) use of anticoagulants [Z79.01] [Z79.01]  Atrial fibrillation (H) [I48.91]  Antiphospholipid antibody syndrome (H) (Resolved) [D68.61]  Personal history of DVT (deep vein thrombosis) (Resolved) [Z86.718]  Atrial fibrillation  unspecified type (H) (Resolved) [I48.91]  Paroxysmal atrial fibrillation (H) [I48.0]           Comments:  JESSICA rodas to manage by exception         Anticoagulation Care Providers      Provider Role Specialty Phone number    Anya Nowak MD Referring Family Medicine [phone]

## 2021-09-18 ENCOUNTER — MYC MEDICAL ADVICE (OUTPATIENT)
Dept: FAMILY MEDICINE | Facility: CLINIC | Age: 71
End: 2021-09-18

## 2021-09-18 DIAGNOSIS — I50.21 ACUTE SYSTOLIC HEART FAILURE (H): ICD-10-CM

## 2021-09-20 ENCOUNTER — TELEPHONE (OUTPATIENT)
Dept: NEUROLOGY | Facility: CLINIC | Age: 71
End: 2021-09-20

## 2021-09-20 RX ORDER — ROSUVASTATIN CALCIUM 40 MG/1
40 TABLET, COATED ORAL DAILY
Qty: 90 TABLET | Refills: 3 | Status: SHIPPED | OUTPATIENT
Start: 2021-09-20 | End: 2021-10-08

## 2021-09-20 NOTE — TELEPHONE ENCOUNTER
Writer did not see any verbiage pertaining to tube feedings in his HEALTH CARE DIRECTIVE document. There is authorization to discuss protected health information with wife, son and daughter.    Writer consulted with MADELYN Ivan  =======================  Video swallow study 2020, referred to Dr. Hill, then diagnosed with Parkinson's disease. Patient of Linda Anthony. Strengthening exercises did not help.  August 2021 patient had an admission due to aspiration pneumonia. Cognition is an issue as well. No motivation to eat (doesn't taste good, etc). Takes 45 minutes to eat TV dinner.    Risk of aspiration, lung compromise, cough weak,  likely to be nutritionally. deficient, lost 10 lbs since hospitalization.     If he chose to obtain a feeding tube, he could still eat a little bit for pleasure - sip Mountain Dew but accept the risk.  ========================

## 2021-09-20 NOTE — TELEPHONE ENCOUNTER
----- Message from Ángel Hill MD sent at 9/16/2021  3:28 PM CDT -----  Regarding: swallowing issues  Looks like his swallowing is worse and wondering if you can reach out about what his thoughts are about a feeding tube, he had a discussion with the SLP.

## 2021-09-21 ENCOUNTER — VIRTUAL VISIT (OUTPATIENT)
Dept: INFECTIOUS DISEASES | Facility: CLINIC | Age: 71
End: 2021-09-21
Attending: INTERNAL MEDICINE
Payer: COMMERCIAL

## 2021-09-21 DIAGNOSIS — R70.0 ELEVATED ERYTHROCYTE SEDIMENTATION RATE: Primary | ICD-10-CM

## 2021-09-21 DIAGNOSIS — Z87.01 HISTORY OF PNEUMONIA: ICD-10-CM

## 2021-09-21 PROCEDURE — 99215 OFFICE O/P EST HI 40 MIN: CPT | Mod: 95 | Performed by: INTERNAL MEDICINE

## 2021-09-21 ASSESSMENT — PAIN SCALES - GENERAL: PAINLEVEL: NO PAIN (0)

## 2021-09-21 NOTE — LETTER
9/21/2021       RE: Haresh Rock  [address] [place] [address]     Dear Colleague,    Thank you for referring your patient, Haresh Rock, to the [facility] INFECTIOUS DISEASE CLINIC [place] at [facility]. Please see a copy of my visit note below.      Haresh Rock is a 71 year old man who is being evaluated via a billable video visit.      ** patient can do Doximity **    How would you like to obtain your AVS? MyChart  If the video visit is dropped, the invitation should be resent by: Text to cell phone: [phone]  Will anyone else be joining your video visit? No      Reason for visit:   Infectious Disease Return Visit, planned follow-up for elevated ESR    SUBJECTIVE:    Shortly after our initial visit, the patient was admitted to the hospital 8/19-8/21/2021.   Working diagnosis was sepsis due to an aspiration pneumonia.  He also had myocardial demand ischemia with newly reduced ejection fraction, which was also attributed to sepsis.    Treatment was Zosyn > ceftriaxone/azithromycin > Augmentin and azithromycin to finish a 5 day course    He was also given two new heart medications upon discharge which he tells me made him feel horrible, so they were stopped by cardiology in clinic.   He feels much better now.  No fevers or chills have recurred since the hospitalization.    SOB with some exertion.     Discussions are happening regarding a feeding tube.    I have reviewed and updated the patient's Past Medical History, Social History, Family History and Medication List.    OBJECTIVE:    Current Outpatient Medications   Medication     acetaminophen (TYLENOL) 500 MG tablet     amoxicillin (AMOXIL) 500 MG capsule  (2000mg before dental appts)     bisacodyl (DULCOLAX) 5 MG EC tablet     carbidopa-levodopa (SINEMET)  MG tablet     docusate sodium (COLACE) 50 MG capsule     dutasteride (AVODART) 0.5 MG capsule     flecainide (TAMBOCOR) 50 MG  tablet     fluticasone (FLONASE) 50 MCG/ACT nasal spray     loratadine (CLARITIN) 10 MG tablet     metoprolol succinate ER (TOPROL XL) 25 MG 24 hr tablet     multivitamin (CENTRUM SILVER) tablet     PREVIDENT 5000 DRY MOUTH 1.1 % GEL topical gel     rosuvastatin (CRESTOR) 40 MG tablet     senna-docusate (SENOKOT-S/PERICOLACE) 8.6-50 MG tablet     vitamin D3 (CHOLECALCIFEROL) 50 mcg (2000 units) tablet     warfarin ANTICOAGULANT (COUMADIN) 2.5 MG tablet     No current facility-administered medications for this visit.       Allergies   Allergen Reactions     Seasonal Allergies        Exam via video visit:     GENERAL: Patient appears chronically ill but is not in any acute distress    HEENT: The eyes do not appear to have any icterus or injection.  EOM appear intact.  RESPIRATORY:  No cough or labored breathing is noted.  SKIN:  No rashes are visible  NEURO:  Awake, alert, no focal neurologic deficits are noted.  PSYCH: Affect is bright. Speech fluent and appropriate.      The rest of a comprehensive physical examination is deferred due to the public health emergency video visit restrictions.    Labs  Reviewed extensively in Epic     Newest:  8/3/2021 with ESR of 90 and CRP of 12 mg/dL.   CBC with normal WBC and platelets of 147     Imaging  2/26/2021  PET with CT:  In this patient with history of Hodgkin lymphoma:  1. Nodular opacities in the left lower lobe with associated  endobronchial debris, also by basilar groundglass opacities which may  represent infection/inflammation versus aspiration, including Covid.  2. Mildly hypermetabolic left hilar and subcarinal lymph node, favored  to be reactive.  3. No hypermetabolic lymphadenopathy in the abdomen or pelvis.  4. Hypermetabolic activity at the anorectal junction with diffuse wall  thickening on CT. Recommend direct visualization.  5. Punctate calcific density at the right UVJ without associated  hydroureter. This may represent recently passed renal stone  versus  bladder calculus.  6. Additional incidental findings are stable, including cholelithiasis  without acute cholecystitis, diverticulosis without acute  diverticulitis, and prostatomegaly.     6/8/2021  Chest XR  IMPRESSION: No pleural fluid or pneumothorax. No airspace disease or  edema. Normal size of the heart. There are degenerative changes in the  spine.     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 He also has hereditary hemachromatosis with cirrhosis, and a recent diagnosis of Parkinson's disease. He also has progressive weight loss over several months, but also has dysphagia related to the Parkinson's, and his diet is fairly limited.      Upon extensive lab evaluation he was found to have an ESR of 92 on 2/15/21 and this has remained persistently elevated on several checks since.   There has not been clear evidence of infection, rash, joint pain, joint swelling, or headache.  Most recent labs 8/3/2021 with ESR of 90 and CRP of 12 mg/dL. CBC important for platelets of 147, which is a stable thrombocytopenia for this patient.      2/26/2021 CTs and PET without evidence for recurrence of malignancy.  There were findings of nodular opacities in the left lower lobe with associated endobronchial debris, also with basilar groundglass opacities.  There was also hypermetabolic activity at the anorectal junction with diffuse wall thickening on CT.     3/17/2021 Flex sigmoidoscopy discovered a 4 mm polyp in the distal sigmoid colon that was removed and erythematous mucosa in the entire examined colon that was biopsied. Histopathology noted colonic mucosa with superficial vascular congestion and melanosis coli without evidence for GVHD.  The polyp was a tubular adenoma, negative for high-grade dysplasia.  Full colonoscopy was done  4/14/2021 without new findings.     About once per month, Haresh reported that he would get a low grade fever.  This happened last on 8/15.  He woke up in the night with a temp of 100.9 F and chills.   He thinks this happens when he gets constipated.   He does not get diarrhea, unless he uses a stool regimen to help with the constipation.  Has not recurred since 8/15.     He can get out of breath with mild exertion, like 1-2 flights of stairs.  He lives in a split level home, and he does get around the half flights fairly well.  He feels drainage down the back of his throat and will cough to clear his throat.  He does not think he has a cough that comes deep from the chest.   His voice volume is lower and he feels hoarse.    He is at risk for aspiration with his dysphagia.       Chronically elevated ESR with mild CRP elevation     Patient has mounting comorbidities.   Among them the history of GVHD stood out as a potential contributor to elevation of inflammatory markers.  However, the scans in 2/2021 did not suggest ongoing GVHD and the biopsy from the sigmoid was not consistent.  Since there was inflammation throughout the colon, I do wonder if the biopsy missed it. Patient is reporting once monthly low grade fevers and chills, that he associates with constipation.  If he is correct, one could suppose that he is having bacterial translocation when constipated across an inflamed colon.     Otherwise, he had the pulmonary findings on CT about 7 months ago.  They were nonspecific.  He does not have much the way of respiratory symptoms.   He was not able to provide an adequate expectorated sputum.     PLAN:     - we discussed repeating the CT Chest, and patient agreed  (order is in)  It has been now been over 7 months.  I am not convinced that he did have pneumonia during his previous admission, as it was all empiricism.   If there are persistent CT Chest findings we could evaluate this further with some more blood  tests, and consider bronchoscopy with BAL, as the patient was not able to expectorate an adequate sputum sample. Note that the patient is also at risk for chronic aspiration.    - repeat CBC with Diff, CMP, ESR, CRP     - elevated ESR with only a mild CRP also suggests to me polymyalgia rheumatica, which was hard for me to tease out today on video visit on ROS given his Parkinsons symptoms.   Associated temporal arteritis is possible even without headache or vision change.  I see that Rheumatology is awaiting Oncology and Infectious Disease evaluations, and has not had a formal consultation as of yet  (I just see an e-consult).     - follow-up after CT Chest and repeat labs      Video-Visit Details    Type of service:  Video Visit    Video Start Time:  5:00PM  Video End Time:  5:30 PM    Originating Location (pt. Location): Home    Distant Location (provider location):  [facility] INFECTIOUS DISEASE CLIN[facility] [place]     Platform used for Video Visit: [employer]isha[employer]      Marcelo Jacques MD, MS  Infectious Disease    Time:  A total of 40 minutes was spent in care of the patient today. 30 minutes were spent on the video, with an additional 10 minutes spent on chart review, orders, and care coordination.

## 2021-09-21 NOTE — PROGRESS NOTES
Haresh Rock is a 71 year old man who is being evaluated via a billable video visit.      ** patient can do Doximity **    How would you like to obtain your AVS? MyChart  If the video visit is dropped, the invitation should be resent by: Text to cell phone: [phone]  Will anyone else be joining your video visit? No      Reason for visit:   Infectious Disease Return Visit, planned follow-up for elevated ESR    SUBJECTIVE:    Shortly after our initial visit, the patient was admitted to the hospital 8/19-8/21/2021.   Working diagnosis was sepsis due to an aspiration pneumonia.  He also had myocardial demand ischemia with newly reduced ejection fraction, which was also attributed to sepsis.    Treatment was Zosyn > ceftriaxone/azithromycin > Augmentin and azithromycin to finish a 5 day course    He was also given two new heart medications upon discharge which he tells me made him feel horrible, so they were stopped by cardiology in clinic.   He feels much better now.  No fevers or chills have recurred since the hospitalization.    SOB with some exertion.     Discussions are happening regarding a feeding tube.    I have reviewed and updated the patient's Past Medical History, Social History, Family History and Medication List.    OBJECTIVE:    Current Outpatient Medications   Medication     acetaminophen (TYLENOL) 500 MG tablet     amoxicillin (AMOXIL) 500 MG capsule  (2000mg before dental appts)     bisacodyl (DULCOLAX) 5 MG EC tablet     carbidopa-levodopa (SINEMET)  MG tablet     docusate sodium (COLACE) 50 MG capsule     dutasteride (AVODART) 0.5 MG capsule     flecainide (TAMBOCOR) 50 MG tablet     fluticasone (FLONASE) 50 MCG/ACT nasal spray     loratadine (CLARITIN) 10 MG tablet     metoprolol succinate ER (TOPROL XL) 25 MG 24 hr tablet     multivitamin (CENTRUM SILVER) tablet     PREVIDENT 5000 DRY MOUTH 1.1 % GEL topical gel     rosuvastatin (CRESTOR) 40 MG tablet     senna-docusate  (SENOKOT-S/PERICOLACE) 8.6-50 MG tablet     vitamin D3 (CHOLECALCIFEROL) 50 mcg (2000 units) tablet     warfarin ANTICOAGULANT (COUMADIN) 2.5 MG tablet     No current facility-administered medications for this visit.       Allergies   Allergen Reactions     Seasonal Allergies        Exam via video visit:     GENERAL: Patient appears chronically ill but is not in any acute distress    HEENT: The eyes do not appear to have any icterus or injection.  EOM appear intact.  RESPIRATORY:  No cough or labored breathing is noted.  SKIN:  No rashes are visible  NEURO:  Awake, alert, no focal neurologic deficits are noted.  PSYCH: Affect is bright. Speech fluent and appropriate.      The rest of a comprehensive physical examination is deferred due to the public [facility] emergency video visit restrictions.    Labs  Reviewed extensively in Epic     Newest:  8/3/2021 with ESR of 90 and CRP of 12 mg/dL.   CBC with normal WBC and platelets of 147     Imaging  2/26/2021  PET with CT:  In this patient with history of Hodgkin lymphoma:  1. Nodular opacities in the left lower lobe with associated  endobronchial debris, also by basilar groundglass opacities which may  represent infection/inflammation versus aspiration, including Covid.  2. Mildly hypermetabolic left hilar and subcarinal lymph node, favored  to be reactive.  3. No hypermetabolic lymphadenopathy in the abdomen or pelvis.  4. Hypermetabolic activity at the anorectal junction with diffuse wall  thickening on CT. Recommend direct visualization.  5. Punctate calcific density at the right UVJ without associated  hydroureter. This may represent recently passed renal stone versus  bladder calculus.  6. Additional incidental findings are stable, including cholelithiasis  without acute cholecystitis, diverticulosis without acute  diverticulitis, and prostatomegaly.     6/8/2021  Chest XR  IMPRESSION: No pleural fluid or pneumothorax. No airspace disease or  edema. Normal size of the  heart. There are degenerative changes in the  spine.     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 He also has hereditary hemachromatosis with cirrhosis, and a recent diagnosis of Parkinson's disease. He also has progressive weight loss over several months, but also has dysphagia related to the Parkinson's, and his diet is fairly limited.      Upon extensive lab evaluation he was found to have an ESR of 92 on 2/15/21 and this has remained persistently elevated on several checks since.   There has not been clear evidence of infection, rash, joint pain, joint swelling, or headache.  Most recent labs 8/3/2021 with ESR of 90 and CRP of 12 mg/dL. CBC important for platelets of 147, which is a stable thrombocytopenia for this patient.      2/26/2021 CTs and PET without evidence for recurrence of malignancy.  There were findings of nodular opacities in the left lower lobe with associated endobronchial debris, also with basilar groundglass opacities.  There was also hypermetabolic activity at the anorectal junction with diffuse wall thickening on CT.     3/17/2021 Flex sigmoidoscopy discovered a 4 mm polyp in the distal sigmoid colon that was removed and erythematous mucosa in the entire examined colon that was biopsied. Histopathology noted colonic mucosa with superficial vascular congestion and melanosis coli without evidence for GVHD.  The polyp was a tubular adenoma, negative for high-grade dysplasia.  Full colonoscopy was done 4/14/2021 without new findings.     About once per month, Haresh reported that he would get a low grade fever.  This happened last on 8/15.  He woke up in the night with a temp of 100.9 F and chills.   He thinks this happens when he gets constipated.   He does not get diarrhea, unless he uses a stool regimen to  help with the constipation.  Has not recurred since 8/15.     He can get out of breath with mild exertion, like 1-2 flights of stairs.  He lives in a split level home, and he does get around the half flights fairly well.  He feels drainage down the back of his throat and will cough to clear his throat.  He does not think he has a cough that comes deep from the chest.   His voice volume is lower and he feels hoarse.    He is at risk for aspiration with his dysphagia.       Chronically elevated ESR with mild CRP elevation     Patient has mounting comorbidities.   Among them the history of GVHD stood out as a potential contributor to elevation of inflammatory markers.  However, the scans in 2/2021 did not suggest ongoing GVHD and the biopsy from the sigmoid was not consistent.  Since there was inflammation throughout the colon, I do wonder if the biopsy missed it. Patient is reporting once monthly low grade fevers and chills, that he associates with constipation.  If he is correct, one could suppose that he is having bacterial translocation when constipated across an inflamed colon.     Otherwise, he had the pulmonary findings on CT about 7 months ago.  They were nonspecific.  He does not have much the way of respiratory symptoms.   He was not able to provide an adequate expectorated sputum.     PLAN:     - we discussed repeating the CT Chest, and patient agreed  (order is in)  It has been now been over 7 months.  I am not convinced that he did have pneumonia during his previous admission, as it was all empiricism.   If there are persistent CT Chest findings we could evaluate this further with some more blood tests, and consider bronchoscopy with BAL, as the patient was not able to expectorate an adequate sputum sample. Note that the patient is also at risk for chronic aspiration.    - repeat CBC with Diff, CMP, ESR, CRP     - elevated ESR with only a mild CRP also suggests to me polymyalgia rheumatica, which was hard  for me to tease out today on video visit on ROS given his Parkinsons symptoms.   Associated temporal arteritis is possible even without headache or vision change.  I see that Rheumatology is awaiting Oncology and Infectious Disease evaluations, and has not had a formal consultation as of yet  (I just see an e-consult).     - follow-up after CT Chest and repeat labs      Video-Visit Details    Type of service:  Video Visit    Video Start Time:  5:00PM  Video End Time:  5:30 PM    Originating Location (pt. Location): Home    Distant Location (provider location):  [facility] INFECTIOUS DISEASE CLINIC [place]     Platform used for Video Visit: [employer]      Marcelo Jacques MD, MS  Infectious Disease    Time:  A total of 40 minutes was spent in care of the patient today. 30 minutes were spent on the video, with an additional 10 minutes spent on chart review, orders, and care coordination.

## 2021-09-24 ENCOUNTER — TRANSFERRED RECORDS (OUTPATIENT)
Dept: HEALTH INFORMATION MANAGEMENT | Facility: CLINIC | Age: 71
End: 2021-09-24

## 2021-09-24 ENCOUNTER — ANTICOAGULATION THERAPY VISIT (OUTPATIENT)
Dept: FAMILY MEDICINE | Facility: CLINIC | Age: 71
End: 2021-09-24

## 2021-09-24 DIAGNOSIS — Z79.01 LONG TERM CURRENT USE OF ANTICOAGULANT THERAPY: Primary | ICD-10-CM

## 2021-09-24 DIAGNOSIS — I48.0 PAROXYSMAL ATRIAL FIBRILLATION (H): ICD-10-CM

## 2021-09-24 DIAGNOSIS — I48.91 ATRIAL FIBRILLATION (H): ICD-10-CM

## 2021-09-24 LAB — INR (EXTERNAL): 1.4 (ref 2–3)

## 2021-09-24 NOTE — PROGRESS NOTES
ANTICOAGULATION MANAGEMENT     Haresh Rock 71 year old male is on warfarin with subtherapeutic INR result. (Goal INR 2.0-3.0)    Recent labs: (last 7 days)     09/24/21  1221   INR 1.4*       ASSESSMENT     Source(s): Chart Review and Patient/Caregiver Call       Warfarin doses taken: Warfarin taken as instructed    Diet: pt had a Ensure this week  may be affecting diet and INR patient does not want to drink Ensure and will stop--patient informed if he does want to drink Ensure to let team know and we can adjust coumadin dosing    New illness, injury, or hospitalization: No    Medication/supplement changes: pt taking a multi-vitamin sporadically--this may also be impacting INR--pt states he does not take daily but will start now--by placing in med box.    Signs or symptoms of bleeding or clotting: No    Previous INR: Therapeutic last visit; previously outside of goal range    Additional findings: None     PLAN     Recommended plan for ongoing change(s) affecting INR     Dosing Instructions: Booster dose then Increase your warfarin dose (13.3% change) with next INR in 1 week       Summary  As of 9/24/2021    Full warfarin instructions:  3.75 mg every Fri; 2.5 mg all other days   Next INR check:               Telephone call with Haresh who verbalizes understanding and agrees to plan and who agrees to plan and repeated back plan correctly    Patient to recheck with home meter    Education provided: Please call back if any changes to your diet, medications or how you've been taking warfarin and Potential interaction between warfarin and MVI and Glucerna    Plan made per [facility] anticoagulation protocol    Belinda Lafleur, RN  Anticoagulation Clinic  9/24/2021    _______________________________________________________________________     Anticoagulation Episode Summary     Current INR goal:  2.0-3.0   TTR:  57.4 % (1 y)   Target end date:  Indefinite   Send INR reminders to:  CHELSEA CHILEL    Indications    Long-term  (current) use of anticoagulants [Z79.01] [Z79.01]  Atrial fibrillation (H) [I48.91]  Antiphospholipid antibody syndrome (H) (Resolved) [D68.61]  Personal history of DVT (deep vein thrombosis) (Resolved) [Z86.718]  Atrial fibrillation  unspecified type (H) (Resolved) [I48.91]  Paroxysmal atrial fibrillation (H) [I48.0]           Comments:  JESSICA okay to manage by exception         Anticoagulation Care Providers     Provider Role Specialty Phone number    Anya Nowak MD Referring Family Medicine [phone]

## 2021-09-29 ENCOUNTER — VIRTUAL VISIT (OUTPATIENT)
Dept: FAMILY MEDICINE | Facility: CLINIC | Age: 71
End: 2021-09-29
Payer: COMMERCIAL

## 2021-09-29 ENCOUNTER — TELEPHONE (OUTPATIENT)
Dept: FAMILY MEDICINE | Facility: CLINIC | Age: 71
End: 2021-09-29

## 2021-09-29 DIAGNOSIS — E43 SEVERE PROTEIN-CALORIE MALNUTRITION (H): ICD-10-CM

## 2021-09-29 DIAGNOSIS — R13.12 OROPHARYNGEAL DYSPHAGIA: Primary | ICD-10-CM

## 2021-09-29 DIAGNOSIS — G20.A1 PARKINSON'S DISEASE (H): ICD-10-CM

## 2021-09-29 DIAGNOSIS — R70.0 ELEVATED ERYTHROCYTE SEDIMENTATION RATE: ICD-10-CM

## 2021-09-29 DIAGNOSIS — R13.10 DYSPHAGIA, UNSPECIFIED TYPE: Primary | ICD-10-CM

## 2021-09-29 PROBLEM — R79.89 ELEVATED TROPONIN: Status: RESOLVED | Noted: 2021-08-19 | Resolved: 2021-09-29

## 2021-09-29 PROBLEM — J69.0 ASPIRATION PNEUMONIA, UNSPECIFIED ASPIRATION PNEUMONIA TYPE, UNSPECIFIED LATERALITY, UNSPECIFIED PART OF LUNG (H): Status: RESOLVED | Noted: 2021-08-19 | Resolved: 2021-09-29

## 2021-09-29 PROBLEM — R06.02 SHORTNESS OF BREATH: Status: RESOLVED | Noted: 2021-08-19 | Resolved: 2021-09-29

## 2021-09-29 PROBLEM — R05.9 COUGH: Status: RESOLVED | Noted: 2021-08-19 | Resolved: 2021-09-29

## 2021-09-29 PROCEDURE — 99213 OFFICE O/P EST LOW 20 MIN: CPT | Mod: 95 | Performed by: FAMILY MEDICINE

## 2021-09-29 NOTE — PROGRESS NOTES
Haresh is a 71 year old who is being evaluated via a billable telephone visit.      What phone number would you like to be contacted at? [phone]  How would you like to obtain your AVS? MyChart    Assessment & Plan     Oropharyngeal dysphagia  Parkinson's disease (H)  -reviewed repeat swallow study results and discussed option to pursue PEG placement through interventional radiology   -follow-up with his neurologist Dr. Hill is advised to maximize treatment; possible restart Sinemet and discuss goals of care     Elevated erythrocyte sedimentation rate  -appreciate ID consult   -appears to be related to aspiration pneumonia, now treated   - Erythrocyte sedimentation rate auto; Future             See Patient Instructions    Return in about 10 months (around 7/28/2022) for Wellness visit.    Anya Nowak MD  [facility]    Subjective   Haresh is a 71 year old who presents for the following health issues     HPI     Haresh Rock is a 71 year old male with Parkinsons's disease, S/P BMT for Hodgkin's with Hx of Nmjys-mg-Qwgd, cirrhosis with hemochromatosis, and BPH with obstruction who presents with 60 pound weight loss over 18 months due to dysphagia. Symptom onset has been sudden, unchanged for a time period of 18 months. Severity is described as moderate. Course of his symptoms over time is unchanged despite consults with several specialists and attempts to supplement calories.     Consults, labs, and swallow study are reviewed.     Review of Systems   Constitutional, HEENT, cardiovascular, pulmonary, gi and gu systems are negative, except as otherwise noted.      Objective           Vitals:  No vitals were obtained today due to virtual visit.    Physical Exam   healthy, alert and no distress  PSYCH: Alert and oriented times 3; coherent speech, normal   rate and volume, able to articulate logical thoughts, able   to abstract reason, no tangential thoughts, no hallucinations   or delusions   His affect is normal  RESP: No cough, no audible wheezing, able to talk in full sentences  Remainder of exam unable to be completed due to telephone visits    Anticoagulation Therapy Visit on 09/24/2021   Component Date Value Ref Range Status     INR (External) 09/24/2021 1.4* 2 - 3 Final        Phone call duration: 16 minutes

## 2021-09-29 NOTE — TELEPHONE ENCOUNTER
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 He would like to know he has maximized treatment of Parkinson's prior to scheduling this with interventional radiology. Would your team reach out to him for scheduling follow-up and let me know if referral to IR now is reasonable?  Anya Nowak MD

## 2021-09-29 NOTE — TELEPHONE ENCOUNTER
Dr. Crews - Who do you advise I have Haresh see for feeding tube or PEG placement? Would GI manage this?   Anya Nowak MD

## 2021-09-29 NOTE — TELEPHONE ENCOUNTER
Spoke to patient and wife Angelica. We discussed the need for the feeding tube and the high risk he is currently at for aspiration pneumonia. Please see my note from 9/20. Whole family is supportive of patient having a PEG tube.    Patient and spouse would like to have education regarding the PEG tube. Writer gave patient the number for the Patient Education Center and writer left voice mail with that same number ([phone]). They will thoroughly go through all that needs to be known about caring for the tube, and how to do tube feedings.    Patient coughed often during the conversation and sounded like a wet clearing of throat. He said it was a bit of post nasal drip.     1. Writer left voice mail for Patient Education to contact patient  2. If patient does not hear from them by Friday, patient will call the number  3. Patient and wife will obtain education and make final decision  4. Patient will notify Dr. Nowak of decision  5. Dr. Nowak would order: IR Gastrostomy Tube Percutaneous Placement ([id number])  6. Dr. Nowak would be responsible for ordering nutrition

## 2021-09-30 ENCOUNTER — TELEPHONE (OUTPATIENT)
Dept: FAMILY MEDICINE | Facility: CLINIC | Age: 71
End: 2021-09-30

## 2021-09-30 NOTE — TELEPHONE ENCOUNTER
Nutrition Education Scheduling Outreach #1:    Call to patient to schedule. Patient declined to schedule and asked to be called back at a better time.    Plan for 2nd outreach attempt within 1 week.    [facility]  Diabetes and Nutrition Scheduling

## 2021-10-01 ENCOUNTER — ANTICOAGULATION THERAPY VISIT (OUTPATIENT)
Dept: FAMILY MEDICINE | Facility: CLINIC | Age: 71
End: 2021-10-01

## 2021-10-01 ENCOUNTER — TRANSFERRED RECORDS (OUTPATIENT)
Dept: HEALTH INFORMATION MANAGEMENT | Facility: CLINIC | Age: 71
End: 2021-10-01

## 2021-10-01 DIAGNOSIS — Z79.01 LONG TERM CURRENT USE OF ANTICOAGULANT THERAPY: Primary | ICD-10-CM

## 2021-10-01 DIAGNOSIS — I48.0 PAROXYSMAL ATRIAL FIBRILLATION (H): ICD-10-CM

## 2021-10-01 DIAGNOSIS — I48.91 ATRIAL FIBRILLATION (H): ICD-10-CM

## 2021-10-01 LAB — INR (EXTERNAL): 2.8 (ref 2–3)

## 2021-10-01 NOTE — PROGRESS NOTES
ANTICOAGULATION MANAGEMENT     Haresh Rock 71 year old male is on warfarin with therapeutic INR result. (Goal INR 2.0-3.0)    Recent labs: (last 7 days)     10/01/21  1402   INR 2.8       ASSESSMENT     Source(s): Chart Review and Patient/Caregiver Call       Warfarin doses taken: Warfarin taken as instructed and Template incorrect; verbally confirmed home dose with Haresh - stayed on old dose as he found out there is no vitamin k in the vitamins he is now taking     Diet: No new diet changes identified    New illness, injury, or hospitalization: No    Medication/supplement changes: taking vitamins    Signs or symptoms of bleeding or clotting: No    Previous INR: Subtherapeutic    Additional findings: None     PLAN     Recommended plan for no diet, medication or health factor changes affecting INR     Dosing Instructions: continue dose of 18.75mg weekly  with next INR in 1 week       Summary  As of 10/1/2021    Full warfarin instructions:  3.75 mg every Mon, Wed, Fri; 2.5 mg all other days   Next INR check:  10/8/2021             Telephone call with Haresh who verbalizes understanding and agrees to plan    Check with home meter    Education provided: Contact [phone]  with any changes, questions or concerns.     Plan made per [facility] anticoagulation protocol    Macrina Preston RN  Anticoagulation Clinic  10/1/2021    _______________________________________________________________________     Anticoagulation Episode Summary     Current INR goal:  2.0-3.0   TTR:  56.5 % (1 y)   Target end date:  Indefinite   [facility]    Indications    Long-term (current) use of anticoagulants [Z79.01] [Z79.01]  Atrial fibrillation (H) [I48.91]  Antiphospholipid antibody syndrome (H) (Resolved) [D68.61]  Personal history of DVT (deep vein thrombosis) (Resolved) [Z86.718]  Atrial fibrillation  unspecified type (H) (Resolved) [I48.91]  Paroxysmal atrial fibrillation (H) [I48.0]           Comments:  JESSICA rodas  to manage by exception         Anticoagulation Care Providers     Provider Role Specialty Phone number    Anya Nowak MD Referring Family Medicine [phone]          Macrina Avila, RN, BSN, PHN  Anticoagulation Nurse  [phone]

## 2021-10-04 ENCOUNTER — HOSPITAL ENCOUNTER (OUTPATIENT)
Dept: EDUCATION SERVICES | Facility: CLINIC | Age: 71
End: 2021-10-04
Payer: COMMERCIAL

## 2021-10-04 NOTE — CONSULTS
10/04/21 1240 Lorena Scott, [place]     Patient and family attended Feeding tube education. Discussed g-tube and Jerrell-key tube cares and feeding including site care, anchoring tube, flushing, medication administration, attaching tube on Jerrell-key tube, bolus and gravity feedings. Pt. RD on a model with flushing, site care, bolus and gravity feedings. Discussed when to care care team. All asked many relevant questions. Answered all teach back questions appropriately. States he understands all information presented.   Literature given: Handwashing and Skin Care, Tube Feeding at Home-Bolus Method Using Syringe and Plunger, Tube Feedings at Home-Aubrey Method, Caring for Your Tube at Home and Jerrell-Key G Feeding tube: your Guide to Proper Care.

## 2021-10-06 NOTE — TELEPHONE ENCOUNTER
Please call patient: When the patient has his G tube placed, I will need nutrition consult done for nutrition orders.   Anya Nowak MD

## 2021-10-06 NOTE — TELEPHONE ENCOUNTER
Nutrition Education Scheduling Outreach #2:    Call to patient to schedule. Patient declined to schedule.        Naomie Pressley  [place] [facility]  Diabetes and Nutrition Scheduling

## 2021-10-06 NOTE — TELEPHONE ENCOUNTER
Called patient.  He agreed with and understands the plan but he wanted to check insurance coverage before scheduling.  He stated that they told him it would be $400 for the consult with the nutritionist   He verified that he does have the number to call and schedule with nutrition    Bharath Phillips RN  [facility]

## 2021-10-08 ENCOUNTER — TRANSFERRED RECORDS (OUTPATIENT)
Dept: HEALTH INFORMATION MANAGEMENT | Facility: CLINIC | Age: 71
End: 2021-10-08

## 2021-10-08 ENCOUNTER — DOCUMENTATION ONLY (OUTPATIENT)
Dept: FAMILY MEDICINE | Facility: CLINIC | Age: 71
End: 2021-10-08

## 2021-10-08 ENCOUNTER — VIRTUAL VISIT (OUTPATIENT)
Dept: NEUROLOGY | Facility: CLINIC | Age: 71
End: 2021-10-08
Payer: COMMERCIAL

## 2021-10-08 DIAGNOSIS — G20.A1 PARKINSON DISEASE (H): Primary | ICD-10-CM

## 2021-10-08 DIAGNOSIS — I48.0 PAROXYSMAL ATRIAL FIBRILLATION (H): ICD-10-CM

## 2021-10-08 DIAGNOSIS — Z86.0100 HISTORY OF COLONIC POLYPS: ICD-10-CM

## 2021-10-08 DIAGNOSIS — Z46.59 ENCOUNTER FOR CARE RELATED TO FEEDING TUBE: ICD-10-CM

## 2021-10-08 DIAGNOSIS — I48.91 ATRIAL FIBRILLATION (H): ICD-10-CM

## 2021-10-08 DIAGNOSIS — I50.21 ACUTE SYSTOLIC HEART FAILURE (H): ICD-10-CM

## 2021-10-08 DIAGNOSIS — Z79.01 LONG TERM CURRENT USE OF ANTICOAGULANT THERAPY: Primary | ICD-10-CM

## 2021-10-08 LAB — INR (EXTERNAL): 2.2 (ref 0.9–1.1)

## 2021-10-08 PROCEDURE — 99215 OFFICE O/P EST HI 40 MIN: CPT | Mod: 95 | Performed by: PSYCHIATRY & NEUROLOGY

## 2021-10-08 RX ORDER — CARBIDOPA AND LEVODOPA 25; 100 MG/1; MG/1
TABLET ORAL
Qty: 90 TABLET | Refills: 11 | Status: ON HOLD | COMMUNITY
Start: 2021-10-08 | End: 2021-11-04

## 2021-10-08 RX ORDER — ROSUVASTATIN CALCIUM 40 MG/1
40 TABLET, COATED ORAL AT BEDTIME
Qty: 90 TABLET | Refills: 3 | COMMUNITY
Start: 2021-10-08 | End: 2021-12-24

## 2021-10-08 RX ORDER — BISACODYL 5 MG/1
TABLET, DELAYED RELEASE ORAL
Qty: 4 TABLET | Refills: 0 | COMMUNITY
Start: 2021-10-08 | End: 2021-10-08

## 2021-10-08 RX ORDER — CARBIDOPA AND LEVODOPA 25; 100 MG/1; MG/1
TABLET ORAL
Qty: 90 TABLET | Refills: 11 | COMMUNITY
Start: 2021-10-08 | End: 2021-10-08

## 2021-10-08 RX ORDER — AMOXICILLIN 250 MG
2 CAPSULE ORAL DAILY PRN
COMMUNITY
Start: 2021-10-08 | End: 2022-01-05

## 2021-10-08 RX ORDER — CALCIUM CARBONATE 500 MG/1
1 TABLET, CHEWABLE ORAL 2 TIMES DAILY PRN
COMMUNITY
End: 2022-01-05

## 2021-10-08 NOTE — PATIENT INSTRUCTIONS
(G20) Parkinson disease (H)  (primary encounter diagnosis)  Dopamine scan - (datscan)  11/3/2020  A presynaptic dopaminergic deficit is demonstrated bilaterally.    Review of diagnosis  parkinson  Avoidance of dopamine blockers yes  Motor complication review  -   Review of Impulse control disorders no  Review of surgical or medication options yes    Gait/Balance/Falls   No recent falls    Exercise/Therapy performed/offered   No specific exercise    Cognitive/Driving   Driving - not problems    Mood     Daughter with bipolar  Son with mood issues  He has some depression    Hallucinations/delusions   No hallucinations    Sleep  Pseudoeph-doxylamine DM APAP nyquil - rare   Sleep pretty well  Nocturia 2/noc  Falls asleep during the day  Napping during the day  Falls asleep when watching news  Falls asleep during the afternoon     Bladder  Renal function.   May have stage 2 disease.      Prostate - denies problems. He has has nocturia couple times per night.   Denies elevated psa    Prostate surgery; urinating better     Dutasteride avodart 0.5mg - daily  Tamsulosin flomax 0.4mg - stopped  Had low blood pressure and fluids     July 5th ED visit and got catheter  Surgery yesterday laser and should get catheter out  Dr Jose G Hawley     GI/Constipation/GERD  Swallow study 9/22/2020  1. Multiple events of deep laryngeal penetration with thinner barium consistencies with intermittent aspiration.      Gallstones but has not gallbladder removed.  Had a gallbladder attack x 1 and is not on actigall     Bisacodyl dulcolax 5mg EC tablet - not using   Calcium carbonate tums 500mg as needed  Nutritional supplement - 1 bottle per day  - not using   Pantoprazole protonix 20mg - not using  Polythyelene glycol miralax - not using   Prevident 5000 dry mouth - using  Senna-docusate senexon-S senokot-S  - as needed or not using   Weight loss      ENDO  Cholesterol  Rosuvastatin crestor 40mg   Vitamin D3 cholecalciferol 50 mcg 2000  units  Had diabetes in the past from prednisone  And this may have resolved  May have a thyroid nodule.  TSH was normal.   A1c was 5.5      Cardio/heart  Flecainide tambocor 50mg twice daily  Metoprolol succinate ER Toprol XL 25mg 24 hr tablet  Blood pressure has been okay   122/63  Low bp has cleared up  MRI of heart next week     Vision  Wears glasses  Eye - left eye has been poor since age 2 due to a lazy eye - amblyopia  Right eye post cataract surgery had an artificial lens put in and does not wear glasses till the evening      Heme   Folic acid folvite 1mg - not taking   Anticoagulated  Warfarin     Hereditary hemochromatosis gene - gene that increases his risk and has not had problems with this. Had had phlebotomies x 2 in the past.      AYUSH Torres     History of pulmonary embolii - back in 2004. This was related to blood clotting problems.   May have had a DVT. Reportedly has had antiphospholipid antibody. He is on coumadin.      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 Had a transplant from Dr. 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  No problems since then.      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      Pulmonary hypertension in the past.   Fluticasone flonase 50 mcg/act nasal spray  Loratadine claritin 10mg    CT of lung next      msk - bilateral knee replacements.     Hearing. Feels he has wax in his left ear and partial problem with the right ear. It is variable problem. Has used ear wax removal.      He reports a neuropathy and that his foot drop has resolved. 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      Medications                 Acetaminophen tylenol 500mg As needed           "  Amoxicillin amoxil 500mg For dentist           Calcium carbonate tums 500mg As needed           Carbidopa/levodopa Sinemet 25/100 Not taking       Dutasteride avodart 0.5mg 1           Flecainide tambocor 50mg 1     1     Fluticasone flonase 50 mcg/act nasal spray Daily or prn           Loratadine claritin 10mg  Daily or prn           Metoprolol succinate ER Toprol XL 25mg 24 hr tablet       1     MVI centrum 1           Prevident 5000 dry mouth using           Pseudoeph-doxylamine DM APAP nyquil As needed           Rosuvastatin crestor 40mg    1    Senna-docusate 8.6-50 As needed           Starch-maltodextrin thick-it With liquids       Vitamin D3 cholecalciferol 50 mcg 2000 units  1           Warfarin anticoagulant coumadin 2.5mg schedule                            Plan:  Ongoing swallowing issues and with weight loss and need for thick-it he is considering a feeding tube  He seems to be in agreement in proceeding with a feeding tube.  He had problems with ensure - it coated his throat and coughed up.  Dr. Hanley is working with him and is \"ready for the feeding tube\" per patient.  He states that medicare may or may not cover the nutrition.     Has a pending Cardiac MRI - for evaluation of stress cardiomyopathy     Has a pending chest CT scan on 10/14/2021 to evaluate cause of elevated inflammatory markers without explanation, recent diagnosis of pneumonia not definitive, 2/2021 PET CT to compare    It may be easier to try the sinemet after he has a feeding tube and is tolerating feedings - so he would be crushing the sinemet and putting the medication in the feeding tube and administering in an escalation schedule.    Return to be determined    Nutrition referral for feeding tube    "

## 2021-10-08 NOTE — LETTER
10/8/2021       RE: Haresh Rock  [address] [place] [address]     Dear Colleague,    Thank you for referring your patient, Haresh Rock, to the [facility] [place] at [facility]. Please see a copy of my visit note below.          VIDEO VISIT - able to do a video visit     Date of Visit: October 8, 2021  Name: Haresh Rock  Date of Birth 1950    [address] [id number] [phone] (M)  [phone] (H)  [email or web address]  Has mychart  proxy - sheyla Ramirez - son  Gemma - daughter  Sheyla Rock  [phone]     [email or web address]   [email or web address]  [email or web address]     [phone]     Sitting in a lazy boy     [phone]      Ongoing blood pressure issues - has normalized lately with a reduction in his metoprolol long acting 25mg dose and flecainide. 129/67 wrist bp reading today     If we are going to try carbidopa/levodopa Sinemet  Would need him to monitor his blood pressure as sinemet can reduce blood pressure and may need 1/2 the amount of metoprolol.      Sinemet Rx sent but would wait on starting this medication.  Would benefit from Leslie Arredondo's input at some point.     He also has ongoing sed rate issues and will be seeing infectious disease next week  Not sure if there were rectal issues causing elevation sed rate or if someone has considered polymyalgia rheumatica, etc vs chronic infection. Consider visiting with rheumatology besides the econsult.     From their consultation --- Recommendations: I recommend consulting with gastroenterology and/or colorectal surgery regarding the PET appearance. Consultation with infectious disease may also be warranted, given the pulmonary abnormalities noted on PET I recommend obtaining urinalysis. If malignancy or infection is not suggested by the PET scan findings, and if weight loss continues, consider temporal artery biopsy.       Would defer to pcp in  regards to considering this depending on workup and others input.      Will be seeing Dr. Coon next week in regards to his neuropathy     Will be getting his urinary catheter removed shortly as he had a prostate procedure.     Return back in 3 months or so.     Assessment:  (G20) Parkinson disease (H)  (primary encounter diagnosis)  Dopamine scan - (datscan)  11/3/2020  A presynaptic dopaminergic deficit is demonstrated bilaterally.    Review of diagnosis  parkinson  Avoidance of dopamine blockers yes  Motor complication review  -   Review of Impulse control disorders no  Review of surgical or medication options yes    Gait/Balance/Falls   No recent falls    Exercise/Therapy performed/offered   No specific exercise    Cognitive/Driving   Driving - not problems    Mood     Daughter with bipolar  Son with mood issues  He has some depression    Hallucinations/delusions   No hallucinations    Sleep  Pseudoeph-doxylamine DM APAP nyquil - rare   Sleep pretty well  Nocturia 2/noc  Falls asleep during the day  Napping during the day  Falls asleep when watching news  Falls asleep during the afternoon     Bladder  Renal function.   May have stage 2 disease.      Prostate - denies problems. He has has nocturia couple times per night.   Denies elevated psa    Prostate surgery; urinating better     Dutasteride avodart 0.5mg - daily  Tamsulosin flomax 0.4mg - stopped  Had low blood pressure and fluids     July 5th ED visit and got catheter  Surgery yesterday laser and should get catheter out  Dr Jose G Hawley     GI/Constipation/GERD  Swallow study 9/22/2020  1. Multiple events of deep laryngeal penetration with thinner barium consistencies with intermittent aspiration.      Gallstones but has not gallbladder removed.  Had a gallbladder attack x 1 and is not on actigall     Bisacodyl dulcolax 5mg EC tablet - not using   Calcium carbonate tums 500mg as needed  Nutritional supplement - 1 bottle per day  - not using    Pantoprazole protonix 20mg - not using  Polythyelene glycol miralax - not using   Prevident 5000 dry mouth - using  Senna-docusate senexon-S senokot-S  - as needed or not using   Weight loss      ENDO  Cholesterol  Rosuvastatin crestor 40mg   Vitamin D3 cholecalciferol 50 mcg 2000 units  Had diabetes in the past from prednisone  And this may have resolved  May have a thyroid nodule.  TSH was normal.   A1c was 5.5      Cardio/heart  Flecainide tambocor 50mg twice daily  Metoprolol succinate ER Toprol XL 25mg 24 hr tablet  Blood pressure has been okay   122/63  Low bp has cleared up  MRI of heart next week     Vision  Wears glasses  Eye - left eye has been poor since age 2 due to a lazy eye - amblyopia  Right eye post cataract surgery had an artificial lens put in and does not wear glasses till the evening      Heme   Folic acid folvite 1mg - not taking   Anticoagulated  Warfarin     Hereditary hemochromatosis gene - gene that increases his risk and has not had problems with this. Had had phlebotomies x 2 in the past.      AYUSH Torres     History of pulmonary embolii - back in 2004. This was related to blood clotting problems.   May have had a DVT. Reportedly has had antiphospholipid antibody. He is on coumadin.      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 Had a transplant from Dr. 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  No problems since then.      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      Pulmonary hypertension in the past.   Fluticasone flonase 50 mcg/act nasal spray  Loratadine claritin 10mg    CT of lung next      msk - bilateral knee replacements.     Hearing. Feels he has wax in his left ear and partial problem with  "the right ear. It is variable problem. Has used ear wax removal.      He reports a neuropathy and that his foot drop has resolved. 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      Medications                 Acetaminophen tylenol 500mg As needed            Amoxicillin amoxil 500mg For dentist           Calcium carbonate tums 500mg As needed           Carbidopa/levodopa Sinemet 25/100 Not taking       Dutasteride avodart 0.5mg 1           Flecainide tambocor 50mg 1     1     Fluticasone flonase 50 mcg/act nasal spray Daily or prn           Loratadine claritin 10mg  Daily or prn           Metoprolol succinate ER Toprol XL 25mg 24 hr tablet       1     MVI centrum 1           Prevident 5000 dry mouth using           Pseudoeph-doxylamine DM APAP nyquil As needed           Rosuvastatin crestor 40mg    1    Senna-docusate 8.6-50 As needed           Starch-maltodextrin thick-it With liquids       Vitamin D3 cholecalciferol 50 mcg 2000 units  1           Warfarin anticoagulant coumadin 2.5mg schedule                            Plan:  Ongoing swallowing issues and with weight loss and need for thick-it he is considering a feeding tube  He seems to be in agreement in proceeding with a feeding tube.  He had problems with ensure - it coated his throat and coughed up.  Dr. Hanley is working with him and is \"ready for the feeding tube\" per patient.  He states that medicare may or may not cover the nutrition.     Has a pending Cardiac MRI - for evaluation of stress cardiomyopathy     Has a pending chest CT scan on 10/14/2021 to evaluate cause of elevated inflammatory markers without explanation, recent diagnosis of pneumonia not definitive, 2/2021 PET CT to compare    It may be easier to try the sinemet after he has a feeding tube and is tolerating feedings - so he would be crushing the sinemet and putting the medication in the feeding tube and administering in an escalation schedule.    Return to be " "determined    Nutrition referral for feeding tube      Medical Decision Making:  #  Chronic progressive medical conditions addressed  Elevated sed rate, swallowing problems  Review and/or interpretation of unique test or documentation from a provider outside of neurology  Scans, sed rate   Independent historian provided additional details  yes   Prescription drug management and review of potential side effects and/or monitoring for side effects  yes   Health impacted by social determinants of health  no    I have reviewed the note as documented above.  This accurately captures the substance of my conversation with the patient and total time spent preparing for visit, executing visit and completing visit on the day of the visit:  40 minutes.     Time of visit: 2pm - 240pm    Ángel Hill MD      ------------------------------------------------------------------------------------------------------------------------------------------------------------------------    Video-Visit Details    The patient has been notified of following:     \"After a review of the patient s situation, this visit was changed from an in-person visit to a video visit to reduce the risk of COVID-19 exposure.   The patient is being evaluated via a billable video visit.\"    \"This video visit will be conducted via a call between you and your physician/provider. We have found that certain health care needs can be provided without the need for an in-person physical exam.  This service lets us provide the care you need with a video conversation.  If a prescription is necessary we can send it directly to your pharmacy.  If lab work is needed we can place an order for that and you can then stop by our lab to have the test done at a later time.    If during the course of the call the physician/provider feels a video visit is not appropriate, you will not be charged for this service.\"     Patient has given verbal consent for Video visit? Yes    Patient " would like the video invitation sent by:     Type of service:  Video Visit    Video Start Time:     Video End Time (time video stopped):     Duration:  minutes - see above    Originating Location (pt. Location):     Distant Location (provider location):  [facility] [place]     Mode of Communication:  Video Conference via [employer] (and if not possible then doximity)      Ángel Hill MD      --------------------------------------------------------------------------------------------------------------    Haresh Rock is a 71 year old male who is being evaluated via a billable video visit.      Charts reviewed  Consult from  Images reviewed        I have reviewed and updated the patient's Past Medical History, Social History, Family History and Medication List.    ALLERGIES  Seasonal allergies    Lasts visit details if there was a last visit:       14 Review of systems  are negative except for   Patient Active Problem List   Diagnosis     Hemochromatosis     Lfpta-npswxo-nzxa disease, chronic (H)     Advanced directives, counseling/discussion     Polyneuropathy     Status post total knee replacements     Status post bone marrow transplant (H)     Hyperlipidemia with target LDL less than 100     Atrial fibrillation (H)     Chronic rhinitis     Gallstones without obstruction of gallbladder     Long-term (current) use of anticoagulants [Z79.01]     Thyroid nodule     Type 2 diabetes mellitus with stage 2 chronic kidney disease, without long-term current use of insulin (H)     History of Hodgkin's lymphoma     History of irradiation, presenting hazards to health     Primary pulmonary hypertension (H)     Hereditary hemochromatosis (H)     Oropharyngeal dysphagia     Articulation disorder     Personal history of PE (pulmonary embolism)     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)     Abnormal dopamine scan (DaTSCAN) 2020     ACP (advance care planning)     Thrombocytopenia (H)     Paroxysmal  atrial fibrillation (H)     Benign prostatic hyperplasia with urinary retention     Constipation     Parkinson's disease (H)        Allergies   Allergen Reactions     Seasonal Allergies      Past Surgical History:   Procedure Laterality Date     ANESTHESIA CARDIOVERSION  4/21/2011    Procedure:ANESTHESIA CARDIOVERSION; Surgeon:GENERIC ANESTHESIA PROVIDER; Location:[facility] OR     ARTHROSCOPY KNEE RT/LT      LT     CATARACT IOL, RT/LT  2017     COLONOSCOPY  10/16/2012    Procedure: COLONOSCOPY;  Colonoscopy, screening;  Surgeon: Reg Feliciano MD;  Location: [facility] OR     COLONOSCOPY N/A 4/14/2021    Procedure: COLONOSCOPY;  Surgeon: Reg Holm MD;  Location: [facility] GI     ESOPHAGOSCOPY, GASTROSCOPY, DUODENOSCOPY (EGD), COMBINED N/A 10/7/2020    Procedure: ESOPHAGOGASTRODUODENOSCOPY, WITH BIOPSY;  Surgeon: Raul Sawyer DO;  Location: [facility] OR     H ABLATION FOCAL AFIB  3/5/2013    PVI     H ABLATION FOCAL AFIB  01/01/2018     HC BONE MARROW/STEM TRANSPLANT ALLOGENIC  5/8/2007     INSERT PORT VASCULAR ACCESS       JOINT REPLACEMTN, KNEE RT/LT  3/28/12    SHAWN     VASECTOMY  1990     Past Medical History:   Diagnosis Date     Abnormal dopamine scan (DaTSCAN) 2020 11/04/2020    [phone] 11/3/2020   Narrative & Impression   Examination: Nuclear medicine DATscan for Dopamine Receptor Localization.   Examination: NM Brain Image Tomographic (SPECT) DATscan   Date: 11/3/2020 3:21 PM   Indication: Parkinsonism   Comparison: None   Additional Information: none   Interfering Medications: None   Technique:   The patient initially received 1 ml of Lugol's solution orally prior     Antiphospholipid antibody syndrome (H) 2005    Ravi's     Antiphospholipid antibody syndrome (H)     Ravi's, noted negative per testing re-done in 2008 in hematology note 01/13/2009     Articulation disorder 09/23/2020     Atrial fibrillation (H) 04/2011     Benign prostatic hyperplasia with urinary retention      Cirrhosis (H)      CKD (chronic  kidney disease) stage 2, GFR 60-89 ml/min      Diabetes mellitus type II     steroid induced     DJD (degenerative joint disease) of knee     SHAWN, worse on right     DVT (deep venous thrombosis) (H)      ED (erectile dysfunction)      Gallbladder polyp 05/2010     Yxwhj-Rpmavp-Nmko Disease 2007    BMT     Hemochromatosis      Hodgkin's disease NOS     5 cycles of ABVD in 2011     HTN (hypertension)      Hyperlipidaemia LDL goal <100      Multiple thyroid nodules     benign      Myeloproliferative disorder (H)     atypical, [facility]     Parkinson disease (H) 09/24/2020     PE (pulmonary embolism) 11/2004     Polyneuropathy      Primary pulmonary hypertension (H)      Thrombocytopenia (H) 06/08/2021     Social History     Socioeconomic History     Marital status:      Spouse name: Angelica     Number of children: 2     Years of education: 21     Highest education level: Not on file   Occupational History     Occupation:      Employer: [employer]     Employer: [employer]   Tobacco Use     Smoking status: Never Smoker     Smokeless tobacco: Never Used   Substance and Sexual Activity     Alcohol use: No     Drug use: No     Sexual activity: Not Currently     Partners: Female   Other Topics Concern     Parent/sibling w/ CABG, MI or angioplasty before 65F 55M? No   Social History Narrative     about 50 yrs        Wife has some health issues - back pain - second knee replaced    She had gastric bypass and a fib and bad mitral valve        Son in [place]    Daughter in Hospitals in [place] with 10 yo son.         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        Born and raised in [place]              Social Determinants of Health     Financial Resource Strain:      Difficulty of Paying Living Expenses:    Food Insecurity:      Worried About Running Out of Food in the Last Year:      Ran Out of Food in the Last Year:    Transportation Needs:      Lack of Transportation (Medical):      Lack of  Transportation (Non-Medical):    Physical Activity:      Days of Exercise per Week:      Minutes of Exercise per Session:    Stress:      Feeling of Stress :    Social Connections:      Frequency of Communication with Friends and Family:      Frequency of Social Gatherings with Friends and Family:      Attends Holiness Services:      Active Member of Clubs or Organizations:      Attends Club or Organization Meetings:      Marital Status:    Intimate Partner Violence:      Fear of Current or Ex-Partner:      Emotionally Abused:      Physically Abused:      Sexually Abused:      Family History   Problem Relation Age of Onset     Hypertension Mother      Cerebrovascular Disease Mother      Breast Cancer Mother      Arrhythmia Brother      Arrhythmia Sister         supraventricular tachycardia     Thyroid Disease Sister      Other - See Comments Son         lives in [place]. no kids and not .     Obsessive Compulsive Disorder Son      Depression Son      Eating Disorder Son         eats when depressed     Obesity Son      Thyroid Cancer Daughter         had thyroid removed     Bipolar Disorder Daughter      Other - See Comments Daughter         lives in [place] - single with one son 9  yrs     Other - See Comments Sister      Alzheimer Disease Father      Diabetes Paternal Aunt      Gastrointestinal Disease Maternal Grandmother         colitis      Parkinsonism Other         2 cousins with parkinson     C.A.D. No family hx of      Current Outpatient Medications   Medication Sig Dispense Refill     acetaminophen (TYLENOL) 500 MG tablet 500mg tab as needed       amoxicillin (AMOXIL) 500 MG capsule 4 x 500mg tabs by mouth 1 hour before dental appointments.       bisacodyl (DULCOLAX) 5 MG EC tablet Refer to instructions sent via Naked. (Patient taking differently: Take 5 mg by mouth daily Refer to instructions sent via Naked.) 4 tablet 0     carbidopa-levodopa (SINEMET)  MG tablet Start with  1/2 tab daily x 3days then 1/2 tab twice daily for 3-7 days then 1 tab twice daily for one week then 1 tablet 3 times per day. The dose can be increased if there is no significant benefit. IT may take 1-3 weeks before benefit is seen  To lessen the effects that protein has on the effectiveness of sinemet (i.e.. Levodopa), it is best to take this medication on an empty stomach one hour before or two hours after meals.     If you are experiencing nausea with the medication, then you should take the medication with a CRACKER, GINGER TABLETS OR WITH FOOD. CALL IF STILL HAVING NAUSEA AS WE CAN TRY EXTRA CARBIDOPA, TRIMETHOBENZAMIDE ETC.     Besides nausea there are other side effects including the rare occurrence of a rash to the yellow dye in the sinemet tablets if you are taking the 25/100 formulation.     Iron may interfere with the effectiveness of this medication so do not take iron supplements at the same time you are taking sinemet. (Patient not taking: Reported on 9/29/2021) 90 tablet 11     docusate sodium (COLACE) 50 MG capsule Take 1 capsule (50 mg) by mouth 2 times daily as needed for constipation 30 capsule 0     dutasteride (AVODART) 0.5 MG capsule Take 1 capsule (0.5 mg) by mouth daily 90 capsule 3     flecainide (TAMBOCOR) 50 MG tablet Take 1 tablet (50 mg) by mouth 2 times daily 180 tablet 2     fluticasone (FLONASE) 50 MCG/ACT nasal spray Spray 1 spray into both nostrils daily as needed for rhinitis 16 g 11     loratadine (CLARITIN) 10 MG tablet Take 10 mg by mouth daily as needed        metoprolol succinate ER (TOPROL XL) 25 MG 24 hr tablet Take 1 tablet (25 mg) by mouth daily In the evening 90 tablet 3     multivitamin (CENTRUM SILVER) tablet Take 1 tablet by mouth daily        PREVIDENT 5000 DRY MOUTH 1.1 % GEL topical gel Take by mouth daily  3     rosuvastatin (CRESTOR) 40 MG tablet Take 1 tablet (40 mg) by mouth daily 90 tablet 3     senna-docusate (SENOKOT-S/PERICOLACE) 8.6-50 MG tablet Take 2  tablets by mouth daily       vitamin D3 (CHOLECALCIFEROL) 50 mcg (2000 units) tablet Take 1 tablet (50 mcg) by mouth daily       warfarin ANTICOAGULANT (COUMADIN) 2.5 MG tablet TAKE 3.75 MG EVERY FRIDAY AND 2.5 MG ALL OTHER DAYS OR AS DIRECTED BY ACC. 90 tablet 3           Again, thank you for allowing me to participate in the care of your patient.      Sincerely,    Ángel Hill MD

## 2021-10-08 NOTE — PROGRESS NOTES
ANTICOAGULATION  MANAGEMENT-Home Monitor Managed by Exception    Haresh EMILIE Rock 71 year old male is on warfarin with therapeutic INR result. (Goal INR 2.0-3.0)    Recent labs: (last 7 days)     10/08/21  1005   INR 2.2*         Previous INR was Therapeutic    Medication, diet, health changes since last INR:chart reviewed; none idientified    Contacted within the last 12 weeks by phone on 10/01/2021      MINNIE Hutson was NOT contacted regarding therapeutic result today per home monitoring policy manage by exception agreement.   Current warfarin dose is to be continued:     Summary  As of 10/8/2021    Full warfarin instructions:  3.75 mg every Fri; 2.5 mg all other days   Next INR check:  10/15/2021           ?   Hari Saavedra RN  Anticoagulation Clinic  10/8/2021    _______________________________________________________________________     Anticoagulation Episode Summary     Current INR goal:  2.0-3.0   TTR:  57.4 % (1 y)   Target end date:  Indefinite   Send INR reminders to:  CHELSEA CHILEL    Indications    Long-term (current) use of anticoagulants [Z79.01] [Z79.01]  Atrial fibrillation (H) [I48.91]  Antiphospholipid antibody syndrome (H) (Resolved) [D68.61]  Personal history of DVT (deep vein thrombosis) (Resolved) [Z86.718]  Atrial fibrillation  unspecified type (H) (Resolved) [I48.91]  Paroxysmal atrial fibrillation (H) [I48.0]           Comments:  JESSICA rodas to manage by exception         Anticoagulation Care Providers     Provider Role Specialty Phone number    Anya Nowak MD Referring Family Medicine [phone]

## 2021-10-08 NOTE — PROGRESS NOTES
VIDEO VISIT - able to do a video visit     Date of Visit: October 8, 2021  Name: Haresh Rock  Date of Birth 1950    [address] [place] [id number] [phone] (M)  [phone] (H)  [email or web address]  Has mychart  proxy - sheyla Ramirez - son  Gemma - daughter  Sheyla Rock  [phone]     [email or web address],   [email or web address]  [email or web address]     [phone]     Sitting in a lazy boy     [phone]      Ongoing blood pressure issues - has normalized lately with a reduction in his metoprolol long acting 25mg dose and flecainide. 129/67 wrist bp reading today     If we are going to try carbidopa/levodopa Sinemet  Would need him to monitor his blood pressure as sinemet can reduce blood pressure and may need 1/2 the amount of metoprolol.      Sinemet Rx sent but would wait on starting this medication.  Would benefit from Leslie Arredondo's input at some point.     He also has ongoing sed rate issues and will be seeing infectious disease next week  Not sure if there were rectal issues causing elevation sed rate or if someone has considered polymyalgia rheumatica, etc vs chronic infection. Consider visiting with rheumatology besides the econsult.     From their consultation --- Recommendations: I recommend consulting with gastroenterology and/or colorectal surgery regarding the PET appearance. Consultation with infectious disease may also be warranted, given the pulmonary abnormalities noted on PET I recommend obtaining urinalysis. If malignancy or infection is not suggested by the PET scan findings, and if weight loss continues, consider temporal artery biopsy.       Would defer to pcp in regards to considering this depending on workup and others input.      Will be seeing Dr. Coon next week in regards to his neuropathy     Will be getting his urinary catheter removed shortly as he had a prostate procedure.     Return back in 3 months or so.     Assessment:  (G20) Parkinson disease (H)   (primary encounter diagnosis)  Dopamine scan - (datscan)  11/3/2020  A presynaptic dopaminergic deficit is demonstrated bilaterally.    Review of diagnosis  parkinson  Avoidance of dopamine blockers yes  Motor complication review  -   Review of Impulse control disorders no  Review of surgical or medication options yes    Gait/Balance/Falls   No recent falls    Exercise/Therapy performed/offered   No specific exercise    Cognitive/Driving   Driving - not problems    Mood     Daughter with bipolar  Son with mood issues  He has some depression    Hallucinations/delusions   No hallucinations    Sleep  Pseudoeph-doxylamine DM APAP nyquil - rare   Sleep pretty well  Nocturia 2/noc  Falls asleep during the day  Napping during the day  Falls asleep when watching news  Falls asleep during the afternoon     Bladder  Renal function.   May have stage 2 disease.      Prostate - denies problems. He has has nocturia couple times per night.   Denies elevated psa    Prostate surgery; urinating better     Dutasteride avodart 0.5mg - daily  Tamsulosin flomax 0.4mg - stopped  Had low blood pressure and fluids     July 5th ED visit and got catheter  Surgery yesterday laser and should get catheter out  Dr Jose G Hawley     GI/Constipation/GERD  Swallow study 9/22/2020  1. Multiple events of deep laryngeal penetration with thinner barium consistencies with intermittent aspiration.      Gallstones but has not gallbladder removed.  Had a gallbladder attack x 1 and is not on actigall     Bisacodyl dulcolax 5mg EC tablet - not using   Calcium carbonate tums 500mg as needed  Nutritional supplement - 1 bottle per day  - not using   Pantoprazole protonix 20mg - not using  Polythyelene glycol miralax - not using   Prevident 5000 dry mouth - using  Senna-docusate senexon-S senokot-S  - as needed or not using   Weight loss      ENDO  Cholesterol  Rosuvastatin crestor 40mg   Vitamin D3 cholecalciferol 50 mcg 2000 units  Had diabetes in the past from  prednisone  And this may have resolved  May have a thyroid nodule.  TSH was normal.   A1c was 5.5      Cardio/heart  Flecainide tambocor 50mg twice daily  Metoprolol succinate ER Toprol XL 25mg 24 hr tablet  Blood pressure has been okay   122/63  Low bp has cleared up  MRI of heart next week     Vision  Wears glasses  Eye - left eye has been poor since age 2 due to a lazy eye - amblyopia  Right eye post cataract surgery had an artificial lens put in and does not wear glasses till the evening      Heme   Folic acid folvite 1mg - not taking   Anticoagulated  Warfarin     Hereditary hemochromatosis gene - gene that increases his risk and has not had problems with this. Had had phlebotomies x 2 in the past.      AYUSH Torres     History of pulmonary embolii - back in 2004. This was related to blood clotting problems.   May have had a DVT. Reportedly has had antiphospholipid antibody. He is on coumadin.      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 Had a transplant from Dr. 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  No problems since then.      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      Pulmonary hypertension in the past.   Fluticasone flonase 50 mcg/act nasal spray  Loratadine claritin 10mg    CT of lung next      msk - bilateral knee replacements.     Hearing. Feels he has wax in his left ear and partial problem with the right ear. It is variable problem. Has used ear wax removal.      He reports a neuropathy and that his foot drop has resolved. 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      Medications                 Acetaminophen tylenol 500mg As needed            Amoxicillin amoxil 500mg For dentist          "  Calcium carbonate tums 500mg As needed           Carbidopa/levodopa Sinemet 25/100 Not taking       Dutasteride avodart 0.5mg 1           Flecainide tambocor 50mg 1     1     Fluticasone flonase 50 mcg/act nasal spray Daily or prn           Loratadine claritin 10mg  Daily or prn           Metoprolol succinate ER Toprol XL 25mg 24 hr tablet       1     MVI centrum 1           Prevident 5000 dry mouth using           Pseudoeph-doxylamine DM APAP nyquil As needed           Rosuvastatin crestor 40mg    1    Senna-docusate 8.6-50 As needed           Starch-maltodextrin thick-it With liquids       Vitamin D3 cholecalciferol 50 mcg 2000 units  1           Warfarin anticoagulant coumadin 2.5mg schedule                            Plan:  Ongoing swallowing issues and with weight loss and need for thick-it he is considering a feeding tube  He seems to be in agreement in proceeding with a feeding tube.  He had problems with ensure - it coated his throat and coughed up.  Dr. Hanley is working with him and is \"ready for the feeding tube\" per patient.  He states that medicare may or may not cover the nutrition.     Has a pending Cardiac MRI - for evaluation of stress cardiomyopathy     Has a pending chest CT scan on 10/14/2021 to evaluate cause of elevated inflammatory markers without explanation, recent diagnosis of pneumonia not definitive, 2/2021 PET CT to compare    It may be easier to try the sinemet after he has a feeding tube and is tolerating feedings - so he would be crushing the sinemet and putting the medication in the feeding tube and administering in an escalation schedule.    Return to be determined    Nutrition referral for feeding tube      Medical Decision Making:  #  Chronic progressive medical conditions addressed  Elevated sed rate, swallowing problems  Review and/or interpretation of unique test or documentation from a provider outside of neurology  Scans, sed rate   Independent historian provided " "additional details  yes   Prescription drug management and review of potential side effects and/or monitoring for side effects  yes   Health impacted by social determinants of health  no    I have reviewed the note as documented above.  This accurately captures the substance of my conversation with the patient and total time spent preparing for visit, executing visit and completing visit on the day of the visit:  40 minutes.     Time of visit: 2pm - 240pm    Ángel Hill MD      ------------------------------------------------------------------------------------------------------------------------------------------------------------------------    Video-Visit Details    The patient has been notified of following:     \"After a review of the patient s situation, this visit was changed from an in-person visit to a video visit to reduce the risk of COVID-19 exposure.   The patient is being evaluated via a billable video visit.\"    \"This video visit will be conducted via a call between you and your physician/provider. We have found that certain health care needs can be provided without the need for an in-person physical exam.  This service lets us provide the care you need with a video conversation.  If a prescription is necessary we can send it directly to your pharmacy.  If lab work is needed we can place an order for that and you can then stop by our lab to have the test done at a later time.    If during the course of the call the physician/provider feels a video visit is not appropriate, you will not be charged for this service.\"     Patient has given verbal consent for Video visit? Yes    Patient would like the video invitation sent by:     Type of service:  Video Visit    Video Start Time:     Video End Time (time video stopped):     Duration:  minutes - see above    Originating Location (pt. Location):     Distant Location (provider location):  [facility] NEUROLOG[facility]     Mode of " Communication:  Video Conference via [employer] (and if not possible then doximity)      Ángel Hill MD      --------------------------------------------------------------------------------------------------------------    Haresh Rock is a 71 year old male who is being evaluated via a billable video visit.      Charts reviewed  Consult from  Images reviewed        I have reviewed and updated the patient's Past Medical History, Social History, Family History and Medication List.    ALLERGIES  Seasonal allergies    Lasts visit details if there was a last visit:       14 Review of systems  are negative except for   Patient Active Problem List   Diagnosis     Hemochromatosis     Zqpdi-lczrvy-bsuf disease, chronic (H)     Advanced directives, counseling/discussion     Polyneuropathy     Status post total knee replacements     Status post bone marrow transplant (H)     Hyperlipidemia with target LDL less than 100     Atrial fibrillation (H)     Chronic rhinitis     Gallstones without obstruction of gallbladder     Long-term (current) use of anticoagulants [Z79.01]     Thyroid nodule     Type 2 diabetes mellitus with stage 2 chronic kidney disease, without long-term current use of insulin (H)     History of Hodgkin's lymphoma     History of irradiation, presenting hazards to health     Primary pulmonary hypertension (H)     Hereditary hemochromatosis (H)     Oropharyngeal dysphagia     Articulation disorder     Personal history of PE (pulmonary embolism)     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)     Abnormal dopamine scan (DaTSCAN) 2020     ACP (advance care planning)     Thrombocytopenia (H)     Paroxysmal atrial fibrillation (H)     Benign prostatic hyperplasia with urinary retention     Constipation     Parkinson's disease (H)        Allergies   Allergen Reactions     Seasonal Allergies      Past Surgical History:   Procedure Laterality Date     ANESTHESIA CARDIOVERSION  4/21/2011    Procedure:ANESTHESIA  CARDIOVERSION; Surgeon:GENERIC ANESTHESIA PROVIDER; Location:[facility] OR     ARTHROSCOPY KNEE RT/LT      LT     CATARACT IOL, RT/LT  2017     COLONOSCOPY  10/16/2012    Procedure: COLONOSCOPY;  Colonoscopy, screening;  Surgeon: Reg Feliciano MD;  Location: [facility] OR     COLONOSCOPY N/A 4/14/2021    Procedure: COLONOSCOPY;  Surgeon: Reg Holm MD;  Location: [facility] GI     ESOPHAGOSCOPY, GASTROSCOPY, DUODENOSCOPY (EGD), COMBINED N/A 10/7/2020    Procedure: ESOPHAGOGASTRODUODENOSCOPY, WITH BIOPSY;  Surgeon: Raul Sawyer DO;  Location: [facility] OR     H ABLATION FOCAL AFIB  3/5/2013    PVI     H ABLATION FOCAL AFIB  01/01/2018     HC BONE MARROW/STEM TRANSPLANT ALLOGENIC  5/8/2007     INSERT PORT VASCULAR ACCESS       JOINT REPLACEMTN, KNEE RT/LT  3/28/12    SHAWN     VASECTOMY  1990     Past Medical History:   Diagnosis Date     Abnormal dopamine scan (DaTSCAN) 2020 11/04/2020    [phone] 11/3/2020   Narrative & Impression   Examination: Nuclear medicine DATscan for Dopamine Receptor Localization.   Examination: NM Brain Image Tomographic (SPECT) DATscan   Date: 11/3/2020 3:21 PM   Indication: Parkinsonism   Comparison: None   Additional Information: none   Interfering Medications: None   Technique:   The patient initially received 1 ml of Lugol's solution orally prior     Antiphospholipid antibody syndrome (H) 2005    Ravi's     Antiphospholipid antibody syndrome (H)     Ravi's, noted negative per testing re-done in 2008 in hematology note 01/13/2009     Articulation disorder 09/23/2020     Atrial fibrillation (H) 04/2011     Benign prostatic hyperplasia with urinary retention      Cirrhosis (H)      CKD (chronic kidney disease) stage 2, GFR 60-89 ml/min      Diabetes mellitus type II     steroid induced     DJD (degenerative joint disease) of knee     SHAWN, worse on right     DVT (deep venous thrombosis) (H)      ED (erectile dysfunction)      Gallbladder polyp 05/2010     Vzuvi-Akjoad-Daoy Disease 2007    BMT      Hemochromatosis      Hodgkin's disease NOS     5 cycles of ABVD in 2011     HTN (hypertension)      Hyperlipidaemia LDL goal <100      Multiple thyroid nodules     benign      Myeloproliferative disorder (H)     atypical, [facility]     Parkinson disease (H) 09/24/2020     PE (pulmonary embolism) 11/2004     Polyneuropathy      Primary pulmonary hypertension (H)      Thrombocytopenia (H) 06/08/2021     Social History     Socioeconomic History     Marital status:      Spouse name: Angelica     Number of children: 2     Years of education: 21     Highest education level: Not on file   Occupational History     Occupation:      Employer: [employer]     Employer: [employer]   Tobacco Use     Smoking status: Never Smoker     Smokeless tobacco: Never Used   Substance and Sexual Activity     Alcohol use: No     Drug use: No     Sexual activity: Not Currently     Partners: Female   Other Topics Concern     Parent/sibling w/ CABG, MI or angioplasty before 65F 55M? No   Social History Narrative     about 50 yrs        Wife has some health issues - back pain - second knee replaced    She had gastric bypass and a fib and bad mitral valve        Son in [place]    Daughter in [facility] with 8 yo son.         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[employer]        Born and raised in [place]              Social Determinants of Health     Financial Resource Strain:      Difficulty of Paying Living Expenses:    Food Insecurity:      Worried About Running Out of Food in the Last Year:      Ran Out of Food in the Last Year:    Transportation Needs:      Lack of Transportation (Medical):      Lack of Transportation (Non-Medical):    Physical Activity:      Days of Exercise per Week:      Minutes of Exercise per Session:    Stress:      Feeling of Stress :    Social Connections:      Frequency of Communication with Friends and Family:      Frequency of Social Gatherings with Friends and Family:       Attends [employer] Services:      Active Member of Clubs or Organizations:      Attends Club or Organization Meetings:      Marital Status:    Intimate Partner Violence:      Fear of Current or Ex-Partner:      Emotionally Abused:      Physically Abused:      Sexually Abused:      Family History   Problem Relation Age of Onset     Hypertension Mother      Cerebrovascular Disease Mother      Breast Cancer Mother      Arrhythmia Brother      Arrhythmia Sister         supraventricular tachycardia     Thyroid Disease Sister      Other - See Comments Son         lives in [place]. no kids and not .     Obsessive Compulsive Disorder Son      Depression Son      Eating Disorder Son         eats when depressed     Obesity Son      Thyroid Cancer Daughter         had thyroid removed     Bipolar Disorder Daughter      Other - See Comments Daughter         lives in [place] - single with one son 9  yrs     Other - See Comments Sister      Alzheimer Disease Father      Diabetes Paternal Aunt      Gastrointestinal Disease Maternal Grandmother         colitis      Parkinsonism Other         2 cousins with parkinson     C.A.D. No family hx of      Current Outpatient Medications   Medication Sig Dispense Refill     acetaminophen (TYLENOL) 500 MG tablet 500mg tab as needed       amoxicillin (AMOXIL) 500 MG capsule 4 x 500mg tabs by mouth 1 hour before dental appointments.       bisacodyl (DULCOLAX) 5 MG EC tablet Refer to instructions sent via [employer]. (Patient taking differently: Take 5 mg by mouth daily Refer to instructions sent via [employer].) 4 tablet 0     carbidopa-levodopa (SINEMET)  MG tablet Start with 1/2 tab daily x 3days then 1/2 tab twice daily for 3-7 days then 1 tab twice daily for one week then 1 tablet 3 times per day. The dose can be increased if there is no significant benefit. IT may take 1-3 weeks before benefit is seen  To lessen the effects that protein has on the effectiveness of sinemet  (i.e.. Levodopa), it is best to take this medication on an empty stomach one hour before or two hours after meals.     If you are experiencing nausea with the medication, then you should take the medication with a CRACKER, GINGER TABLETS OR WITH FOOD. CALL IF STILL HAVING NAUSEA AS WE CAN TRY EXTRA CARBIDOPA, TRIMETHOBENZAMIDE ETC.     Besides nausea there are other side effects including the rare occurrence of a rash to the yellow dye in the sinemet tablets if you are taking the 25/100 formulation.     Iron may interfere with the effectiveness of this medication so do not take iron supplements at the same time you are taking sinemet. (Patient not taking: Reported on 9/29/2021) 90 tablet 11     docusate sodium (COLACE) 50 MG capsule Take 1 capsule (50 mg) by mouth 2 times daily as needed for constipation 30 capsule 0     dutasteride (AVODART) 0.5 MG capsule Take 1 capsule (0.5 mg) by mouth daily 90 capsule 3     flecainide (TAMBOCOR) 50 MG tablet Take 1 tablet (50 mg) by mouth 2 times daily 180 tablet 2     fluticasone (FLONASE) 50 MCG/ACT nasal spray Spray 1 spray into both nostrils daily as needed for rhinitis 16 g 11     loratadine (CLARITIN) 10 MG tablet Take 10 mg by mouth daily as needed        metoprolol succinate ER (TOPROL XL) 25 MG 24 hr tablet Take 1 tablet (25 mg) by mouth daily In the evening 90 tablet 3     multivitamin (CENTRUM SILVER) tablet Take 1 tablet by mouth daily        PREVIDENT 5000 DRY MOUTH 1.1 % GEL topical gel Take by mouth daily  3     rosuvastatin (CRESTOR) 40 MG tablet Take 1 tablet (40 mg) by mouth daily 90 tablet 3     senna-docusate (SENOKOT-S/PERICOLACE) 8.6-50 MG tablet Take 2 tablets by mouth daily       vitamin D3 (CHOLECALCIFEROL) 50 mcg (2000 units) tablet Take 1 tablet (50 mcg) by mouth daily       warfarin ANTICOAGULANT (COUMADIN) 2.5 MG tablet TAKE 3.75 MG EVERY FRIDAY AND 2.5 MG ALL OTHER DAYS OR AS DIRECTED BY ACC. 90 tablet 3

## 2021-10-08 NOTE — PROGRESS NOTES
Haresh is a 71 year old who is being evaluated via a billable telephone visit.      What phone number would you like to be contacted at? [phone]  How would you like to obtain your AVS? [employer]  Phone call duration: -- see below ---  minutes

## 2021-10-10 DIAGNOSIS — I47.10 PAROXYSMAL SUPRAVENTRICULAR TACHYCARDIA (H): ICD-10-CM

## 2021-10-10 DIAGNOSIS — I48.0 PAROXYSMAL ATRIAL FIBRILLATION (H): ICD-10-CM

## 2021-10-12 RX ORDER — FLECAINIDE ACETATE 50 MG/1
TABLET ORAL
Qty: 180 TABLET | Refills: 0 | Status: SHIPPED | OUTPATIENT
Start: 2021-10-12 | End: 2022-01-06

## 2021-10-13 ENCOUNTER — HOSPITAL ENCOUNTER (OUTPATIENT)
Dept: MRI IMAGING | Facility: CLINIC | Age: 71
Discharge: HOME | End: 2021-10-13
Attending: STUDENT IN AN ORGANIZED HEALTH CARE EDUCATION/TRAINING PROGRAM | Admitting: STUDENT IN AN ORGANIZED HEALTH CARE EDUCATION/TRAINING PROGRAM
Payer: COMMERCIAL

## 2021-10-13 VITALS
RESPIRATION RATE: 16 BRPM | DIASTOLIC BLOOD PRESSURE: 56 MMHG | SYSTOLIC BLOOD PRESSURE: 100 MMHG | HEART RATE: 66 BPM | OXYGEN SATURATION: 97 %

## 2021-10-13 DIAGNOSIS — I50.21 ACUTE SYSTOLIC HEART FAILURE (H): ICD-10-CM

## 2021-10-13 PROCEDURE — 93017 CV STRESS TEST TRACING ONLY: CPT

## 2021-10-13 PROCEDURE — 93018 CV STRESS TEST I&R ONLY: CPT | Performed by: INTERNAL MEDICINE

## 2021-10-13 PROCEDURE — 75563 CARD MRI W/STRESS IMG & DYE: CPT

## 2021-10-13 PROCEDURE — A9585 GADOBUTROL INJECTION: HCPCS | Performed by: STUDENT IN AN ORGANIZED HEALTH CARE EDUCATION/TRAINING PROGRAM

## 2021-10-13 PROCEDURE — 250N000011 HC RX IP 250 OP 636: Performed by: INTERNAL MEDICINE

## 2021-10-13 PROCEDURE — 999N000054 HC STATISTIC EKG NON-CHARGEABLE

## 2021-10-13 PROCEDURE — 75563 CARD MRI W/STRESS IMG & DYE: CPT | Mod: 26 | Performed by: INTERNAL MEDICINE

## 2021-10-13 PROCEDURE — 93010 ELECTROCARDIOGRAM REPORT: CPT | Mod: 76 | Performed by: INTERNAL MEDICINE

## 2021-10-13 PROCEDURE — 255N000002 HC RX 255 OP 636: Performed by: STUDENT IN AN ORGANIZED HEALTH CARE EDUCATION/TRAINING PROGRAM

## 2021-10-13 PROCEDURE — 93016 CV STRESS TEST SUPVJ ONLY: CPT | Performed by: INTERNAL MEDICINE

## 2021-10-13 RX ORDER — GADOBUTROL 604.72 MG/ML
7.5 INJECTION INTRAVENOUS ONCE
Status: COMPLETED | OUTPATIENT
Start: 2021-10-13 | End: 2021-10-13

## 2021-10-13 RX ORDER — REGADENOSON 0.08 MG/ML
0.4 INJECTION, SOLUTION INTRAVENOUS ONCE
Status: COMPLETED | OUTPATIENT
Start: 2021-10-13 | End: 2021-10-13

## 2021-10-13 RX ORDER — DIPHENHYDRAMINE HCL 25 MG
25 CAPSULE ORAL
Status: DISCONTINUED | OUTPATIENT
Start: 2021-10-13 | End: 2021-10-14 | Stop reason: HOSPADM

## 2021-10-13 RX ORDER — AMINOPHYLLINE 25 MG/ML
100 INJECTION, SOLUTION INTRAVENOUS ONCE
Status: COMPLETED | OUTPATIENT
Start: 2021-10-13 | End: 2021-10-13

## 2021-10-13 RX ORDER — ONDANSETRON 2 MG/ML
4 INJECTION INTRAMUSCULAR; INTRAVENOUS
Status: DISCONTINUED | OUTPATIENT
Start: 2021-10-13 | End: 2021-10-14 | Stop reason: HOSPADM

## 2021-10-13 RX ORDER — DIPHENHYDRAMINE HYDROCHLORIDE 50 MG/ML
25-50 INJECTION INTRAMUSCULAR; INTRAVENOUS
Status: DISCONTINUED | OUTPATIENT
Start: 2021-10-13 | End: 2021-10-14 | Stop reason: HOSPADM

## 2021-10-13 RX ORDER — CAFFEINE CITRATE 20 MG/ML
60 SOLUTION INTRAVENOUS
Status: DISCONTINUED | OUTPATIENT
Start: 2021-10-13 | End: 2021-10-14 | Stop reason: HOSPADM

## 2021-10-13 RX ORDER — ACYCLOVIR 200 MG/1
0-1 CAPSULE ORAL
Status: DISCONTINUED | OUTPATIENT
Start: 2021-10-13 | End: 2021-10-14 | Stop reason: HOSPADM

## 2021-10-13 RX ORDER — DIAZEPAM 5 MG
5 TABLET ORAL EVERY 30 MIN PRN
Status: DISCONTINUED | OUTPATIENT
Start: 2021-10-13 | End: 2021-10-14 | Stop reason: HOSPADM

## 2021-10-13 RX ORDER — METHYLPREDNISOLONE SODIUM SUCCINATE 125 MG/2ML
125 INJECTION, POWDER, LYOPHILIZED, FOR SOLUTION INTRAMUSCULAR; INTRAVENOUS
Status: DISCONTINUED | OUTPATIENT
Start: 2021-10-13 | End: 2021-10-14 | Stop reason: HOSPADM

## 2021-10-13 RX ORDER — ALBUTEROL SULFATE 90 UG/1
2 AEROSOL, METERED RESPIRATORY (INHALATION) EVERY 5 MIN PRN
Status: DISCONTINUED | OUTPATIENT
Start: 2021-10-13 | End: 2021-10-14 | Stop reason: HOSPADM

## 2021-10-13 RX ADMIN — GADOBUTROL 7.5 ML: 604.72 INJECTION INTRAVENOUS at 10:03

## 2021-10-13 RX ADMIN — REGADENOSON 0.4 MG: 0.08 INJECTION, SOLUTION INTRAVENOUS at 09:33

## 2021-10-13 RX ADMIN — AMINOPHYLLINE 100 MG: 25 INJECTION, SOLUTION INTRAVENOUS at 09:35

## 2021-10-14 ENCOUNTER — ANCILLARY PROCEDURE (OUTPATIENT)
Dept: CT IMAGING | Facility: CLINIC | Age: 71
End: 2021-10-14
Attending: INTERNAL MEDICINE
Payer: COMMERCIAL

## 2021-10-14 ENCOUNTER — LAB (OUTPATIENT)
Dept: LAB | Facility: CLINIC | Age: 71
End: 2021-10-14
Attending: INTERNAL MEDICINE
Payer: COMMERCIAL

## 2021-10-14 DIAGNOSIS — Z87.01 HISTORY OF PNEUMONIA: ICD-10-CM

## 2021-10-14 DIAGNOSIS — R70.0 ELEVATED ERYTHROCYTE SEDIMENTATION RATE: ICD-10-CM

## 2021-10-14 LAB
ACID FAST STAIN (ARUP): NORMAL
ALBUMIN SERPL-MCNC: 3 G/DL (ref 3.4–5)
ALP SERPL-CCNC: 134 U/L (ref 40–150)
ALT SERPL W P-5'-P-CCNC: 15 U/L (ref 0–70)
ANION GAP SERPL CALCULATED.3IONS-SCNC: 3 MMOL/L (ref 3–14)
AST SERPL W P-5'-P-CCNC: 24 U/L (ref 0–45)
ATRIAL RATE - MUSE: 62 BPM
ATRIAL RATE - MUSE: 63 BPM
BASOPHILS # BLD AUTO: 0 10E3/UL (ref 0–0.2)
BASOPHILS NFR BLD AUTO: 1 %
BILIRUB SERPL-MCNC: 0.6 MG/DL (ref 0.2–1.3)
BUN SERPL-MCNC: 21 MG/DL (ref 7–30)
CALCIUM SERPL-MCNC: 8.7 MG/DL (ref 8.5–10.1)
CHLORIDE BLD-SCNC: 104 MMOL/L (ref 94–109)
CO2 SERPL-SCNC: 31 MMOL/L (ref 20–32)
CREAT SERPL-MCNC: 1.23 MG/DL (ref 0.66–1.25)
CRP SERPL-MCNC: 20.1 MG/L (ref 0–8)
DIASTOLIC BLOOD PRESSURE - MUSE: NORMAL MMHG
DIASTOLIC BLOOD PRESSURE - MUSE: NORMAL MMHG
EOSINOPHIL # BLD AUTO: 0.1 10E3/UL (ref 0–0.7)
EOSINOPHIL NFR BLD AUTO: 1 %
ERYTHROCYTE [DISTWIDTH] IN BLOOD BY AUTOMATED COUNT: 13.5 % (ref 10–15)
ERYTHROCYTE [SEDIMENTATION RATE] IN BLOOD BY WESTERGREN METHOD: 105 MM/HR (ref 0–20)
GFR SERPL CREATININE-BSD FRML MDRD: 58 ML/MIN/1.73M2
GLUCOSE BLD-MCNC: 158 MG/DL (ref 70–99)
HCT VFR BLD AUTO: 43.6 % (ref 40–53)
HGB BLD-MCNC: 14.9 G/DL (ref 13.3–17.7)
IMM GRANULOCYTES # BLD: 0 10E3/UL
IMM GRANULOCYTES NFR BLD: 0 %
INTERPRETATION ECG - MUSE: NORMAL
INTERPRETATION ECG - MUSE: NORMAL
LYMPHOCYTES # BLD AUTO: 2.5 10E3/UL (ref 0.8–5.3)
LYMPHOCYTES NFR BLD AUTO: 34 %
MCH RBC QN AUTO: 31.8 PG (ref 26.5–33)
MCHC RBC AUTO-ENTMCNC: 34.2 G/DL (ref 31.5–36.5)
MCV RBC AUTO: 93 FL (ref 78–100)
MONOCYTES # BLD AUTO: 0.5 10E3/UL (ref 0–1.3)
MONOCYTES NFR BLD AUTO: 7 %
NEUTROPHILS # BLD AUTO: 4.3 10E3/UL (ref 1.6–8.3)
NEUTROPHILS NFR BLD AUTO: 57 %
NRBC # BLD AUTO: 0 10E3/UL
NRBC BLD AUTO-RTO: 0 /100
P AXIS - MUSE: 62 DEGREES
P AXIS - MUSE: 99 DEGREES
PLATELET # BLD AUTO: 130 10E3/UL (ref 150–450)
POTASSIUM BLD-SCNC: 3.6 MMOL/L (ref 3.4–5.3)
PR INTERVAL - MUSE: 224 MS
PR INTERVAL - MUSE: 226 MS
PROT SERPL-MCNC: 7.7 G/DL (ref 6.8–8.8)
QRS DURATION - MUSE: 84 MS
QRS DURATION - MUSE: 88 MS
QT - MUSE: 400 MS
QT - MUSE: 412 MS
QTC - MUSE: 409 MS
QTC - MUSE: 418 MS
R AXIS - MUSE: -8 DEGREES
R AXIS - MUSE: 3 DEGREES
RBC # BLD AUTO: 4.68 10E6/UL (ref 4.4–5.9)
SODIUM SERPL-SCNC: 138 MMOL/L (ref 133–144)
SYSTOLIC BLOOD PRESSURE - MUSE: NORMAL MMHG
SYSTOLIC BLOOD PRESSURE - MUSE: NORMAL MMHG
T AXIS - MUSE: 44 DEGREES
T AXIS - MUSE: 57 DEGREES
VENTRICULAR RATE- MUSE: 62 BPM
VENTRICULAR RATE- MUSE: 63 BPM
WBC # BLD AUTO: 7.5 10E3/UL (ref 4–11)

## 2021-10-14 PROCEDURE — 85025 COMPLETE CBC W/AUTO DIFF WBC: CPT | Performed by: PATHOLOGY

## 2021-10-14 PROCEDURE — 85652 RBC SED RATE AUTOMATED: CPT | Performed by: PATHOLOGY

## 2021-10-14 PROCEDURE — 36415 COLL VENOUS BLD VENIPUNCTURE: CPT | Performed by: PATHOLOGY

## 2021-10-14 PROCEDURE — 86140 C-REACTIVE PROTEIN: CPT | Performed by: PATHOLOGY

## 2021-10-14 PROCEDURE — 71250 CT THORAX DX C-: CPT | Mod: GC | Performed by: RADIOLOGY

## 2021-10-14 PROCEDURE — 80053 COMPREHEN METABOLIC PANEL: CPT | Performed by: PATHOLOGY

## 2021-10-14 NOTE — RESULT ENCOUNTER NOTE
Haresh,    Your sed rate is still high. Follow-up with me if you are having headaches. I think this is due to aspiration and the PEG tube is the next best step.     Anay Nowak MD

## 2021-10-15 ENCOUNTER — ANTICOAGULATION THERAPY VISIT (OUTPATIENT)
Dept: FAMILY MEDICINE | Facility: CLINIC | Age: 71
End: 2021-10-15

## 2021-10-15 ENCOUNTER — TRANSFERRED RECORDS (OUTPATIENT)
Dept: HEALTH INFORMATION MANAGEMENT | Facility: CLINIC | Age: 71
End: 2021-10-15
Payer: COMMERCIAL

## 2021-10-15 DIAGNOSIS — Z79.01 LONG TERM CURRENT USE OF ANTICOAGULANT THERAPY: Primary | ICD-10-CM

## 2021-10-15 DIAGNOSIS — I48.91 ATRIAL FIBRILLATION (H): ICD-10-CM

## 2021-10-15 DIAGNOSIS — I48.0 PAROXYSMAL ATRIAL FIBRILLATION (H): ICD-10-CM

## 2021-10-15 LAB — INR (EXTERNAL): 3 (ref 2–3)

## 2021-10-15 NOTE — PROGRESS NOTES
ANTICOAGULATION  MANAGEMENT-Home Monitor Managed by Exception    Haresh Rock 71 year old male is on warfarin with therapeutic INR result. (Goal INR 2.0-3.0)    Recent labs: (last 7 days)     10/15/21  1107   INR 3.0         Previous INR was Therapeutic    Medication, diet, health changes since last INR:chart reviewed; none idientified    Contacted within the last 12 weeks by phone on 10/1/21      MINNIE Hutson was NOT contacted regarding therapeutic result today per home monitoring policy manage by exception agreement.   Current warfarin dose is to be continued:     Summary  As of 10/15/2021    Full warfarin instructions:  3.75 mg every Fri; 2.5 mg all other days   Next INR check:  10/22/2021           ?   Macrina Preston RN  Anticoagulation Clinic  10/15/2021    _______________________________________________________________________     Anticoagulation Episode Summary     Current INR goal:  2.0-3.0   TTR:  60.0 % (1 y)   Target end date:  Indefinite   Send INR reminders to:  CHELSEA CHILEL    Indications    Long-term (current) use of anticoagulants [Z79.01] [Z79.01]  Atrial fibrillation (H) [I48.91]  Antiphospholipid antibody syndrome (H) (Resolved) [D68.61]  Personal history of DVT (deep vein thrombosis) (Resolved) [Z86.718]  Atrial fibrillation  unspecified type (H) (Resolved) [I48.91]  Paroxysmal atrial fibrillation (H) [I48.0]           Comments:  JESSICA rodas to manage by exception         Anticoagulation Care Providers     Provider Role Specialty Phone number    Anya Nowak MD Referring Family Medicine [phone]          Macrina Avila RN, BSN, PHN  Anticoagulation Nurse  [phone]

## 2021-10-16 ENCOUNTER — TRANSFERRED RECORDS (OUTPATIENT)
Dept: HEALTH INFORMATION MANAGEMENT | Facility: CLINIC | Age: 71
End: 2021-10-16
Payer: COMMERCIAL

## 2021-10-16 LAB — INR (EXTERNAL): 3 (ref 0.9–1.1)

## 2021-10-18 ENCOUNTER — TELEPHONE (OUTPATIENT)
Dept: FAMILY MEDICINE | Facility: CLINIC | Age: 71
End: 2021-10-18

## 2021-10-18 ENCOUNTER — VIRTUAL VISIT (OUTPATIENT)
Dept: CARDIOLOGY | Facility: CLINIC | Age: 71
End: 2021-10-18
Payer: COMMERCIAL

## 2021-10-18 DIAGNOSIS — G20.A1 PARKINSON DISEASE (H): ICD-10-CM

## 2021-10-18 DIAGNOSIS — Z11.59 ENCOUNTER FOR SCREENING FOR OTHER VIRAL DISEASES: ICD-10-CM

## 2021-10-18 DIAGNOSIS — I42.8 NONISCHEMIC CARDIOMYOPATHY (H): ICD-10-CM

## 2021-10-18 DIAGNOSIS — I48.0 PAROXYSMAL ATRIAL FIBRILLATION (H): Primary | ICD-10-CM

## 2021-10-18 DIAGNOSIS — R13.10 DYSPHAGIA, UNSPECIFIED TYPE: Primary | ICD-10-CM

## 2021-10-18 DIAGNOSIS — R63.4 WEIGHT LOSS: ICD-10-CM

## 2021-10-18 PROCEDURE — 99213 OFFICE O/P EST LOW 20 MIN: CPT | Mod: 95 | Performed by: INTERNAL MEDICINE

## 2021-10-18 NOTE — PROGRESS NOTES
Haresh is a 71 year old who is being evaluated via a billable video visit.      How would you like to obtain your AVS? Sifthart  If the video visit is dropped, the invitation should be resent by: Other e-mail: Lamonte  Will anyone else be joining your video visit? No    Video Start Time: 11:05 AM  Video-Visit Details    Type of service:  Video Visit    Video End Time: 11:10 AM    Originating Location (pt. Location): Home    Distant Location (provider location):  [facility] HEART [facility]     Platform used for Video Visit: [facility]     HPI: Follow-up of atrial fibrillation    Haresh Rock is a 71 year old male admitted on 8/19/2021. 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    Patient's last visit with me was on September 13, 2021.  At that time patient was feeling unwell, with low blood pressures and dehydration likely due to recently started medications such as entresto and  farxiga.  Those medications were discontinued unfortunately in the last 1 month, patient has felt remarkably better.  A cardiac MRI which shows normalization of left ventricular ejection fraction and absence of any evidence of myocardial ischemia.    Overall the patient is doing much better compared to 1 month ago.  He does not report any symptoms of palpitations, irregular heartbeat sensation, unusual shortness of breath, or exertional dyspnea.      PAST MEDICAL HISTORY:  Past Medical History:   Diagnosis Date     Abnormal dopamine scan (DaTSCAN) 2020 11/04/2020    [phone] 11/3/2020   Narrative & Impression   Examination: Nuclear medicine DATscan for Dopamine Receptor Localization.   Examination: NM Brain Image Tomographic (SPECT)  DATscan   Date: 11/3/2020 3:21 PM   Indication: Parkinsonism   Comparison: None   Additional Information: none   Interfering Medications: None   Technique:   The patient initially received 1 ml of Lugol's solution orally prior     Antiphospholipid antibody syndrome (H) 2005    Ravi's     Antiphospholipid antibody syndrome (H)     Ravi's, noted negative per testing re-done in 2008 in hematology note 01/13/2009     Articulation disorder 09/23/2020     Atrial fibrillation (H) 04/2011     Benign prostatic hyperplasia with urinary retention      Cirrhosis (H)      CKD (chronic kidney disease) stage 2, GFR 60-89 ml/min      Diabetes mellitus type II     steroid induced     DJD (degenerative joint disease) of knee     SHAWN, worse on right     DVT (deep venous thrombosis) (H)      ED (erectile dysfunction)      Gallbladder polyp 05/2010     Pzjte-Qnbeyp-Vmby Disease 2007    BMT     Hemochromatosis      Hodgkin's disease NOS     5 cycles of ABVD in 2011     HTN (hypertension)      Hyperlipidaemia LDL goal <100      Multiple thyroid nodules     benign      Myeloproliferative disorder (H)     atypical, [facility]     Parkinson disease (H) 09/24/2020     PE (pulmonary embolism) 11/2004     Polyneuropathy      Primary pulmonary hypertension (H)      Thrombocytopenia (H) 06/08/2021       CURRENT MEDICATIONS:  Current Outpatient Medications   Medication Sig Dispense Refill     acetaminophen (TYLENOL) 500 MG tablet 500mg tab as needed       amoxicillin (AMOXIL) 500 MG capsule 4 x 500mg tabs by mouth 1 hour before dental appointments.       calcium carbonate (TUMS) 500 MG chewable tablet Take 1 chew tab by mouth daily as needed for heartburn       carbidopa-levodopa (SINEMET)  MG tablet Not taking 90 tablet 11     dutasteride (AVODART) 0.5 MG capsule Take 1 capsule (0.5 mg) by mouth daily 90 capsule 3     flecainide (TAMBOCOR) 50 MG tablet TAKE 1 TABLET BY MOUTH TWICE A  tablet 0     fluticasone (FLONASE) 50 MCG/ACT nasal  spray Spray 1 spray into both nostrils daily as needed for rhinitis 16 g 11     loratadine (CLARITIN) 10 MG tablet Take 10 mg by mouth daily as needed        metoprolol succinate ER (TOPROL XL) 25 MG 24 hr tablet Take 1 tablet (25 mg) by mouth daily In the evening 90 tablet 3     multivitamin (CENTRUM SILVER) tablet Take 1 tablet by mouth daily        PREVIDENT 5000 DRY MOUTH 1.1 % GEL topical gel Take by mouth daily  3     Pseudoeph-Doxylamine-DM-APAP (NYQUIL PO) As needed       rosuvastatin (CRESTOR) 40 MG tablet Take 1 tablet (40 mg) by mouth At Bedtime 90 tablet 3     senna-docusate (SENOKOT-S/PERICOLACE) 8.6-50 MG tablet Take 2 tablets by mouth daily as needed for constipation       Starch-Maltodextrin (THICK-IT PO)        vitamin D3 (CHOLECALCIFEROL) 50 mcg (2000 units) tablet Take 1 tablet (50 mcg) by mouth daily       warfarin ANTICOAGULANT (COUMADIN) 2.5 MG tablet TAKE 3.75 MG EVERY FRIDAY AND 2.5 MG ALL OTHER DAYS OR AS DIRECTED BY ACC. 90 tablet 3       PAST SURGICAL HISTORY:  Past Surgical History:   Procedure Laterality Date     ANESTHESIA CARDIOVERSION  4/21/2011    Procedure:ANESTHESIA CARDIOVERSION; Surgeon:GENERIC ANESTHESIA PROVIDER; Location:[facility] OR     ARTHROSCOPY KNEE RT/LT      LT     CATARACT IOL, RT/LT  2017     COLONOSCOPY  10/16/2012    Procedure: COLONOSCOPY;  Colonoscopy, screening;  Surgeon: Reg Feliciano MD;  Location: [facility] OR     COLONOSCOPY N/A 4/14/2021    Procedure: COLONOSCOPY;  Surgeon: Reg Holm MD;  Location: [facility] GI     ESOPHAGOSCOPY, GASTROSCOPY, DUODENOSCOPY (EGD), COMBINED N/A 10/7/2020    Procedure: ESOPHAGOGASTRODUODENOSCOPY, WITH BIOPSY;  Surgeon: Raul Sawyer DO;  Location: [facility] OR     H ABLATION FOCAL AFIB  3/5/2013    PVI     H ABLATION FOCAL AFIB  01/01/2018     HC BONE MARROW/STEM TRANSPLANT ALLOGENIC  5/8/2007     INSERT PORT VASCULAR ACCESS       JOINT REPLACEMTN, KNEE RT/LT  3/28/12    SHAWN     VASECTOMY  1990       ALLERGIES:     Allergies   Allergen  Reactions     Seasonal Allergies        FAMILY HISTORY:  - Premature coronary artery disease  - Atrial fibrillation  - Sudden cardiac death     SOCIAL HISTORY:  Social History     Tobacco Use     Smoking status: Never Smoker     Smokeless tobacco: Never Used   Substance Use Topics     Alcohol use: No     Drug use: No       ROS:   Constitutional: No fever, chills, or sweats. Weight stable.   ENT: No visual disturbance, ear ache, epistaxis, sore throat.   Cardiovascular: As per HPI.   Respiratory: No cough, hemoptysis.    GI: No nausea, vomiting, hematemesis, melena, or hematochezia.   : No hematuria.   Integument: Negative.   Psychiatric: Negative.   Hematologic:  Easy bruising, no easy bleeding.  Neuro: Negative.   Endocrinology: No significant heat or cold intolerance   Musculoskeletal: No myalgia.    Exam:  There were no vitals taken for this visit.  GENERAL APPEARANCE: healthy, alert and no distress      Labs:  CBC RESULTS:   Lab Results   Component Value Date    WBC 7.5 10/14/2021    WBC 8.8 07/05/2021    RBC 4.68 10/14/2021    RBC 4.30 (L) 07/05/2021    HGB 14.9 10/14/2021    HGB 13.8 07/05/2021    HCT 43.6 10/14/2021    HCT 38.9 (L) 07/05/2021    MCV 93 10/14/2021    MCV 91 07/05/2021    MCH 31.8 10/14/2021    MCH 32.1 07/05/2021    MCHC 34.2 10/14/2021    MCHC 35.5 07/05/2021    RDW 13.5 10/14/2021    RDW 13.0 07/05/2021     (L) 10/14/2021     (L) 07/05/2021       BMP RESULTS:  Lab Results   Component Value Date     10/14/2021     07/05/2021    POTASSIUM 3.6 10/14/2021    POTASSIUM 3.3 (L) 07/05/2021    CHLORIDE 104 10/14/2021    CHLORIDE 103 07/05/2021    CO2 31 10/14/2021    CO2 28 07/05/2021    ANIONGAP 3 10/14/2021    ANIONGAP 4 07/05/2021     (H) 10/14/2021    GLC 97 07/05/2021    BUN 21 10/14/2021    BUN 25 07/05/2021    CR 1.23 10/14/2021    CR 1.15 07/05/2021    GFRESTIMATED 58 (L) 10/14/2021    GFRESTIMATED 63 07/05/2021    GFRESTBLACK 73 07/05/2021    GILBERT 8.7  10/14/2021    GILBERT 8.3 (L) 07/05/2021        INR RESULTS:  Lab Results   Component Value Date    INR 3.0 (A) 10/16/2021    INR 3 10/15/2021    INR 2.2 (A) 10/08/2021    INR 2.8 10/01/2021    INR 1.9 07/16/2021    INR 2.7 (A) 07/09/2021    INR 2.87 (H) 07/05/2021    INR 1.8 (A) 07/02/2021       Procedures:      Assessment and Plan:    Paroxysmal atrial fibrillation-well-controlled by flecainide and metoprolol  Nonischemic cardiomyopathy-resolved    It is encouraging that patient is doing much better after discontinuing  entresto and  Farxiga.  It is further encouraging that the left ventricular systolic function has normalized.  Patient will continue on current cardiac medications and we will see patient again by video visit in approximately 1 year prior to that visit, patient will undergo a transthoracic echocardiogram to define the left ventricular ejection fraction.    All questions and concerns were addressed and patient is happy with the plan.      CC  Patient Care Team:  Anya Nowak MD as PCP - General (Family Practice)  Augusto Miller MD as Referring Physician (Internal Medicine)  Jesusita Mejia PA-C as Physician Assistant  Pino Sharma MD as MD (Internal Medicine)  Fabi Jimenez MD as MD (INTERNAL MEDICINE - ENDOCRINOLOGY, DIABETES & METABOLISM)  Bekah Matt MD as MD (Cardiology)  Tina Mejia, RN as Specialty Care Coordinator (Cardiology)  Shanelle Yeager RN as Specialty Care Coordinator (BMT - Adult)  Anya Nowak MD as Assigned PCP  Bekah Matt MD as Assigned Heart and Vascular Provider  Ángel Hill MD as Assigned Neuroscience Provider  eLslie Arredondo [facility] as Pharmacist (Pharmacist)  Rosalva Samuels MD as MD (Family Medicine)  Chelsey Crews MD as Assigned Gastroenterology Provider  Jose G Hawley MD as Assigned Surgical Provider  Bekah Matt MD as MD (Clinical Cardiac Electrophysiology)  Marcelo Jacques  MD APRIL as Assigned Infectious Disease Provider

## 2021-10-18 NOTE — PROGRESS NOTES
Outpatient IR Referral    Patient is a 70 y/o male with a PMH of HDL, GERD, DM II, A fib on coumadin, myeloproliferative disease, hodgkin's lymphoma s/p chemotherapy, Pulmonary HTN, dysphagia, parkinson's disease, ongoing weight loss and is currently using thick it. Patient was seen by the [facility] 10/4/21, has referral for dietician that needs to be scheduled. Patient also has concerns regarding insurance coverage of nutrition.   IR has been asked to place a gastrostomy tube by Dr. oNwak [facility].     Case and imaging 2/2021 was reviewed with Dr. Adame from IR and G tube placement is approved. To note day of procedure imaging evaluation will determine if patient has a safe window for access.     INR will need to be below 1.5-1.8 day of procedure.     Procedure order placed and procedure scheduling pending dietician consultation and insurance coverage.     Primary team Dr. Nowak made aware of IR recommendations via epic messaging.     DEJAH Worley CNP  Interventional Radiology   IR on-call pager: [phone]

## 2021-10-18 NOTE — TELEPHONE ENCOUNTER
Received call from [facility] IR stating INR needs to be less than 1.8 for tube placement. Spoke with pt and instructed him to test INR on 10/20. ACC will follow up with patient and instruct on how many days to hold based on 10/20 INR.

## 2021-10-18 NOTE — PATIENT INSTRUCTIONS
Thank you for coming to the [facility] @ [facility]; please note the following instructions:    1.         If you have any questions regarding your visit please contact your care team:     Cardiology  Telephone Number   Tiffany PÉREZ, RN  Salma KELSEY, RN   Mandi RAMIREZ, QASIM NEFF, QASIM RUFFIN, LPN   [phone] (option [phone])   For scheduling appts:     [phone] (select option 1)       For the Device Clinic (Pacemakers and ICD's)  RN's :  Chelsey Carrera   During business hours: [phone]    *After business hours:  [phone] (select option 4)      Normal test result notifications will be released via [email or web address] or mailed within 7 business days.  All other test results, will be communicated via telephone once reviewed by your cardiologist.    If you need a medication refill please contact your pharmacy.  Please allow 3 business days for your refill to be completed.    As always, thank you for trusting us with your health care needs!

## 2021-10-18 NOTE — TELEPHONE ENCOUNTER
Date: 10/25  Procedure: Gastronomy tube placement     See Orders Only encounter from 10/18/21 for IR assessment and plan.    Macrina Avila, RN, BSN, PHN  Anticoagulation Nurse  [phone]

## 2021-10-18 NOTE — TELEPHONE ENCOUNTER
Augusto Miller MD Johnson, Michelle A, [facility]  Caller: Unspecified (Today,  1:27 PM)  That INR should be fine 1.5-1.8 for the tube placement   ashia           OK for hold.  Advise INR recheck 10/20/2021 to assess if needs longer hold, or if if less days will be adequate based on INR at that time.    Xiomara Tello, PharmD BCACP  Anticoagulation Clinical Pharmacist

## 2021-10-18 NOTE — TELEPHONE ENCOUNTER
[facility]-PROCEDURAL ANTICOAGULATION  MANAGEMENT    ASSESSMENT     Warfarin interruption plan for G tube placement on 10/25/2021.  Target INR 1.5-1.8 per IR.    Indication for Anticoagulation: Atrial Fibrillation, DVT and PE      Risk stratification for thromboembolism: moderate (2017 ACC periprocedure pathway for NVAF Expert Consensus and 2018 American Society of Hematology guideline)    o [id number] = 3 (Hypertension, Age 65-74 and Vascular- vasc DX)  o DVT & PE 11/04  o PE 07/05  o Lupus anticoagulant + weak 01/18/2005, 04/14/2005, 12/07/2005, but on retest, negative 2x in 2008      VTE: 2018 American Society of Hematology recommends against bridging in low and moderate risk VTE patients holding warfarin    AFIB: 2019 AHA/ACC/HRS update of Afib guidelines recommend for patients with Afib without mechanical valves who required interruption of warfarin decisions about bridging should balance risks of stroke and bleeding and the duration of time a patient will not be anticoagulated.  They note that bridging maybe be appropriate only in patients with a very high thromboembolic risk     RECOMMENDATION       Pre-Procedure:  o Hold warfarin for 5 days, until after procedure starting: 10/20/2021   o No Bridge      Post-Procedure:  o Resume warfarin dose if okay with provider doing procedure on night of procedure, 10/25/2021 PM: 5mg, then PTA dosing  o Recheck INR ~ 7 days after resuming warfarin         Plan routed to referring provider for approval  ?   Xiomara Tello [facility]    SUBJECTIVE/OBJECTIVE     Haresh Rock, a 71 year old male    Goal INR Range: 2.0-3.0     Patient bridged in past: Yes: 2013    Pertinent History: recent hold 04/2021 for colonoscopy with no bridge    Wt Readings from Last 3 Encounters:   09/13/21 64 kg (141 lb)   08/30/21 65.7 kg (144 lb 12.8 oz)   08/19/21 68 kg (150 lb)      Ideal body weight: 75 kg (165 lb 5.2 oz)     Estimated body mass index is 19.74 kg/m  as calculated from the  "following:    Height as of 8/30/21: 1.8 m (5' 10.87\").    Weight as of 9/13/21: 64 kg (141 lb).    Lab Results   Component Value Date    INR 3.0 (A) 10/16/2021    INR 3 10/15/2021    INR 2.2 (A) 10/08/2021     Lab Results   Component Value Date    HGB 14.9 10/14/2021    HGB 13.8 07/05/2021    HCT 43.6 10/14/2021    HCT 38.9 07/05/2021     10/14/2021     07/05/2021     Lab Results   Component Value Date    CR 1.23 10/14/2021    CR 0.99 08/20/2021    CR 1.09 08/19/2021     Estimated Creatinine Clearance: 48.4 mL/min (based on SCr of 1.23 mg/dL).  "

## 2021-10-18 NOTE — LETTER
10/18/2021      RE: Haresh Rock  [address] [facility] [address]       Dear Colleague,    Thank you for the opportunity to participate in the care of your patient, Haresh Rock, at the [facility] HEART [facility]. Please see a copy of my visit note below.    Haresh is a 71 year old who is being evaluated via a billable video visit.      How would you like to obtain your AVS? MyChart  If the video visit is dropped, the invitation should be resent by: Other e-mail: [employer]  Will anyone else be joining your video visit? No    Video Start Time: 11:05 AM  Video-Visit Details    Type of service:  Video Visit    Video End Time: 11:10 AM    Originating Location (pt. Location): Home    Distant Location (provider location):  [facility]     Platform used for Video Visit: Doximity     HPI: Follow-up of atrial fibrillation    Haresh Rock is a 71 year old male admitted on 8/19/2021. 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    Patient's last visit with me was on September 13, 2021.  At that time patient was feeling unwell, with low blood pressures and dehydration likely due to recently started medications such as entresto and  farxiga.  Those medications were discontinued unfortunately in the last 1 month, patient has felt remarkably better.  A cardiac MRI which shows normalization of left ventricular ejection fraction and absence of any evidence of myocardial ischemia.    Overall the patient is doing much better compared to 1 month ago.  He does not report any symptoms of palpitations, irregular heartbeat sensation, unusual  shortness of breath, or exertional dyspnea.      PAST MEDICAL HISTORY:  Past Medical History:   Diagnosis Date     Abnormal dopamine scan (DaTSCAN) 2020 11/04/2020    [phone] 11/3/2020   Narrative & Impression   Examination: Nuclear medicine DATscan for Dopamine Receptor Localization.   Examination: NM Brain Image Tomographic (SPECT) DATscan   Date: 11/3/2020 3:21 PM   Indication: Parkinsonism   Comparison: None   Additional Information: none   Interfering Medications: None   Technique:   The patient initially received 1 ml of Lugol's solution orally prior     Antiphospholipid antibody syndrome (H) 2005    Ravi's     Antiphospholipid antibody syndrome (H)     Ravi's, noted negative per testing re-done in 2008 in hematology note 01/13/2009     Articulation disorder 09/23/2020     Atrial fibrillation (H) 04/2011     Benign prostatic hyperplasia with urinary retention      Cirrhosis (H)      CKD (chronic kidney disease) stage 2, GFR 60-89 ml/min      Diabetes mellitus type II     steroid induced     DJD (degenerative joint disease) of knee     SHAWN, worse on right     DVT (deep venous thrombosis) (H)      ED (erectile dysfunction)      Gallbladder polyp 05/2010     Hbnsc-Dwapdj-Cpht Disease 2007    BMT     Hemochromatosis      Hodgkin's disease NOS     5 cycles of ABVD in 2011     HTN (hypertension)      Hyperlipidaemia LDL goal <100      Multiple thyroid nodules     benign      Myeloproliferative disorder (H)     atypical, [facility]     Parkinson disease (H) 09/24/2020     PE (pulmonary embolism) 11/2004     Polyneuropathy      Primary pulmonary hypertension (H)      Thrombocytopenia (H) 06/08/2021       CURRENT MEDICATIONS:  Current Outpatient Medications   Medication Sig Dispense Refill     acetaminophen (TYLENOL) 500 MG tablet 500mg tab as needed       amoxicillin (AMOXIL) 500 MG capsule 4 x 500mg tabs by mouth 1 hour before dental appointments.       calcium carbonate (TUMS) 500 MG chewable tablet Take 1 chew  tab by mouth daily as needed for heartburn       carbidopa-levodopa (SINEMET)  MG tablet Not taking 90 tablet 11     dutasteride (AVODART) 0.5 MG capsule Take 1 capsule (0.5 mg) by mouth daily 90 capsule 3     flecainide (TAMBOCOR) 50 MG tablet TAKE 1 TABLET BY MOUTH TWICE A  tablet 0     fluticasone (FLONASE) 50 MCG/ACT nasal spray Spray 1 spray into both nostrils daily as needed for rhinitis 16 g 11     loratadine (CLARITIN) 10 MG tablet Take 10 mg by mouth daily as needed        metoprolol succinate ER (TOPROL XL) 25 MG 24 hr tablet Take 1 tablet (25 mg) by mouth daily In the evening 90 tablet 3     multivitamin (CENTRUM SILVER) tablet Take 1 tablet by mouth daily        PREVIDENT 5000 DRY MOUTH 1.1 % GEL topical gel Take by mouth daily  3     Pseudoeph-Doxylamine-DM-APAP (NYQUIL PO) As needed       rosuvastatin (CRESTOR) 40 MG tablet Take 1 tablet (40 mg) by mouth At Bedtime 90 tablet 3     senna-docusate (SENOKOT-S/PERICOLACE) 8.6-50 MG tablet Take 2 tablets by mouth daily as needed for constipation       Starch-Maltodextrin (THICK-IT PO)        vitamin D3 (CHOLECALCIFEROL) 50 mcg (2000 units) tablet Take 1 tablet (50 mcg) by mouth daily       warfarin ANTICOAGULANT (COUMADIN) 2.5 MG tablet TAKE 3.75 MG EVERY FRIDAY AND 2.5 MG ALL OTHER DAYS OR AS DIRECTED BY ACC. 90 tablet 3       PAST SURGICAL HISTORY:  Past Surgical History:   Procedure Laterality Date     ANESTHESIA CARDIOVERSION  4/21/2011    Procedure:ANESTHESIA CARDIOVERSION; Surgeon:GENERIC ANESTHESIA PROVIDER; Location:[facility] OR     ARTHROSCOPY KNEE RT/LT      LT     CATARACT IOL, RT/LT  2017     COLONOSCOPY  10/16/2012    Procedure: COLONOSCOPY;  Colonoscopy, screening;  Surgeon: Reg Feliciano MD;  Location:  OR     COLONOSCOPY N/A 4/14/2021    Procedure: COLONOSCOPY;  Surgeon: Reg Holm MD;  Location: [facility]     ESOPHAGOSCOPY, GASTROSCOPY, DUODENOSCOPY (EGD), COMBINED N/A 10/7/2020    Procedure: ESOPHAGOGASTRODUODENOSCOPY,  WITH BIOPSY;  Surgeon: Raul Sawyer DO;  Location: [facility] OR     H ABLATION FOCAL AFIB  3/5/2013    PVI     H ABLATION FOCAL AFIB  01/01/2018     HC BONE MARROW/STEM TRANSPLANT ALLOGENIC  5/8/2007     INSERT PORT VASCULAR ACCESS       JOINT REPLACEMTN, KNEE RT/LT  3/28/12    SHAWN     VASECTOMY  1990       ALLERGIES:     Allergies   Allergen Reactions     Seasonal Allergies        FAMILY HISTORY:  - Premature coronary artery disease  - Atrial fibrillation  - Sudden cardiac death     SOCIAL HISTORY:  Social History     Tobacco Use     Smoking status: Never Smoker     Smokeless tobacco: Never Used   Substance Use Topics     Alcohol use: No     Drug use: No       ROS:   Constitutional: No fever, chills, or sweats. Weight stable.   ENT: No visual disturbance, ear ache, epistaxis, sore throat.   Cardiovascular: As per HPI.   Respiratory: No cough, hemoptysis.    GI: No nausea, vomiting, hematemesis, melena, or hematochezia.   : No hematuria.   Integument: Negative.   Psychiatric: Negative.   Hematologic:  Easy bruising, no easy bleeding.  Neuro: Negative.   Endocrinology: No significant heat or cold intolerance   Musculoskeletal: No myalgia.    Exam:  There were no vitals taken for this visit.  GENERAL APPEARANCE: healthy, alert and no distress      Labs:  CBC RESULTS:   Lab Results   Component Value Date    WBC 7.5 10/14/2021    WBC 8.8 07/05/2021    RBC 4.68 10/14/2021    RBC 4.30 (L) 07/05/2021    HGB 14.9 10/14/2021    HGB 13.8 07/05/2021    HCT 43.6 10/14/2021    HCT 38.9 (L) 07/05/2021    MCV 93 10/14/2021    MCV 91 07/05/2021    MCH 31.8 10/14/2021    MCH 32.1 07/05/2021    MCHC 34.2 10/14/2021    MCHC 35.5 07/05/2021    RDW 13.5 10/14/2021    RDW 13.0 07/05/2021     (L) 10/14/2021     (L) 07/05/2021       BMP RESULTS:  Lab Results   Component Value Date     10/14/2021     07/05/2021    POTASSIUM 3.6 10/14/2021    POTASSIUM 3.3 (L) 07/05/2021    CHLORIDE 104 10/14/2021    CHLORIDE 103  07/05/2021    CO2 31 10/14/2021    CO2 28 07/05/2021    ANIONGAP 3 10/14/2021    ANIONGAP 4 07/05/2021     (H) 10/14/2021    GLC 97 07/05/2021    BUN 21 10/14/2021    BUN 25 07/05/2021    CR 1.23 10/14/2021    CR 1.15 07/05/2021    GFRESTIMATED 58 (L) 10/14/2021    GFRESTIMATED 63 07/05/2021    GFRESTBLACK 73 07/05/2021    GILBERT 8.7 10/14/2021    GILBERT 8.3 (L) 07/05/2021        INR RESULTS:  Lab Results   Component Value Date    INR 3.0 (A) 10/16/2021    INR 3 10/15/2021    INR 2.2 (A) 10/08/2021    INR 2.8 10/01/2021    INR 1.9 07/16/2021    INR 2.7 (A) 07/09/2021    INR 2.87 (H) 07/05/2021    INR 1.8 (A) 07/02/2021       Procedures:      Assessment and Plan:    Paroxysmal atrial fibrillation-well-controlled by flecainide and metoprolol  Nonischemic cardiomyopathy-resolved    It is encouraging that patient is doing much better after discontinuing  entresto and  Farxiga.  It is further encouraging that the left ventricular systolic function has normalized.  Patient will continue on current cardiac medications and we will see patient again by video visit in approximately 1 year prior to that visit, patient will undergo a transthoracic echocardiogram to define the left ventricular ejection fraction.    All questions and concerns were addressed and patient is happy with the plan.      CC  Patient Care Team:  Anya Nowak MD as PCP - General (Family Practice)  Augusto Miller MD as Referring Physician (Internal Medicine)  Jesusita Mejia PA-C as Physician Assistant  Pino Sharma MD as MD (Internal Medicine)  Fabi Jimenez MD as MD (INTERNAL MEDICINE - ENDOCRINOLOGY, DIABETES & METABOLISM)  Bekah Matt MD as MD (Cardiology)  Tina Mejia, RN as Specialty Care Coordinator (Cardiology)  Shanelle Yeager RN as Specialty Care Coordinator (BMT - Adult)  Anya Nowak MD as Assigned PCP  Bekah Matt MD as Assigned Heart and Vascular Provider  [place]  MD Alejandro as Assigned Neuroscience Provider  Leslie Arredondo [facility] as Pharmacist (Pharmacist)  Rosalva Samuels MD as MD (Family Medicine)  Chelsey Crews MD as Assigned Gastroenterology Provider  Jose G Hawley MD as Assigned Surgical Provider  Bekah Matt MD as MD (Clinical Cardiac Electrophysiology)  Marcelo Jacques MD as Assigned Infectious Disease Provider

## 2021-10-20 ENCOUNTER — IMMUNIZATION (OUTPATIENT)
Dept: NURSING | Facility: CLINIC | Age: 71
End: 2021-10-20
Payer: COMMERCIAL

## 2021-10-20 ENCOUNTER — TRANSFERRED RECORDS (OUTPATIENT)
Dept: HEALTH INFORMATION MANAGEMENT | Facility: CLINIC | Age: 71
End: 2021-10-20

## 2021-10-20 ENCOUNTER — DOCUMENTATION ONLY (OUTPATIENT)
Dept: FAMILY MEDICINE | Facility: CLINIC | Age: 71
End: 2021-10-20

## 2021-10-20 DIAGNOSIS — Z79.01 LONG TERM CURRENT USE OF ANTICOAGULANT THERAPY: Primary | ICD-10-CM

## 2021-10-20 DIAGNOSIS — I48.91 ATRIAL FIBRILLATION (H): ICD-10-CM

## 2021-10-20 DIAGNOSIS — I48.0 PAROXYSMAL ATRIAL FIBRILLATION (H): ICD-10-CM

## 2021-10-20 LAB — INR (EXTERNAL): 2.5 (ref 0.9–1.1)

## 2021-10-20 PROCEDURE — 91301 PR COVID VAC MODERNA 100 MCG/0.5 ML IM: CPT

## 2021-10-20 PROCEDURE — 0013A PR COVID VAC MODERNA 100 MCG/0.5 ML IM: CPT

## 2021-10-20 NOTE — PROGRESS NOTES
ANTICOAGULATION  MANAGEMENT-Home Monitor Managed by Exception    Haresh EMILIE Rock 71 year old male is on warfarin with therapeutic INR result. (Goal INR 2.0-3.0)    Recent labs: (last 7 days)     10/20/21  0929   INR 2.5*         Previous INR was Therapeutic    Medication, diet, health changes since last INR:chart reviewed; none idientified    Contacted within the last 12 weeks by phone on 10/1/21      MINNIE Hutson was NOT contacted regarding therapeutic result today per home monitoring policy manage by exception agreement.   Current warfarin dose is to be continued:     Summary  As of 10/20/2021    Full warfarin instructions:  3.75 mg every Fri; 2.5 mg all other days   Next INR check:             ?   Blanca Rodriguez RN  Anticoagulation Clinic  10/20/2021    _______________________________________________________________________     Anticoagulation Episode Summary     Current INR goal:  2.0-3.0   TTR:  60.2 % (1 y)   Target end date:  Indefinite   Send INR reminders to:  CHELSEA CHILEL    Indications    Long-term (current) use of anticoagulants [Z79.01] [Z79.01]  Atrial fibrillation (H) [I48.91]  Antiphospholipid antibody syndrome (H) (Resolved) [D68.61]  Personal history of DVT (deep vein thrombosis) (Resolved) [Z86.718]  Atrial fibrillation  unspecified type (H) (Resolved) [I48.91]  Paroxysmal atrial fibrillation (H) [I48.0]           Comments:  JESSICA rodas to manage by exception         Anticoagulation Care Providers     Provider Role Specialty Phone number    Anya Nowak MD Referring Family Medicine [phone]

## 2021-10-22 ENCOUNTER — OFFICE VISIT (OUTPATIENT)
Dept: FAMILY MEDICINE | Facility: CLINIC | Age: 71
End: 2021-10-22
Payer: COMMERCIAL

## 2021-10-22 ENCOUNTER — LAB (OUTPATIENT)
Dept: LAB | Facility: CLINIC | Age: 71
End: 2021-10-22
Attending: NURSE PRACTITIONER
Payer: COMMERCIAL

## 2021-10-22 VITALS
DIASTOLIC BLOOD PRESSURE: 69 MMHG | BODY MASS INDEX: 20.44 KG/M2 | HEART RATE: 63 BPM | WEIGHT: 146 LBS | SYSTOLIC BLOOD PRESSURE: 111 MMHG | TEMPERATURE: 97.1 F

## 2021-10-22 DIAGNOSIS — Z11.59 ENCOUNTER FOR SCREENING FOR OTHER VIRAL DISEASES: ICD-10-CM

## 2021-10-22 DIAGNOSIS — R13.10 SWALLOWING PROBLEM: ICD-10-CM

## 2021-10-22 DIAGNOSIS — Z01.818 PREOPERATIVE EXAMINATION: Primary | ICD-10-CM

## 2021-10-22 DIAGNOSIS — G20.A1 PARKINSON'S DISEASE (H): ICD-10-CM

## 2021-10-22 LAB — SARS-COV-2 RNA RESP QL NAA+PROBE: NEGATIVE

## 2021-10-22 PROCEDURE — U0005 INFEC AGEN DETEC AMPLI PROBE: HCPCS

## 2021-10-22 PROCEDURE — 99213 OFFICE O/P EST LOW 20 MIN: CPT | Performed by: FAMILY MEDICINE

## 2021-10-22 PROCEDURE — U0003 INFECTIOUS AGENT DETECTION BY NUCLEIC ACID (DNA OR RNA); SEVERE ACUTE RESPIRATORY SYNDROME CORONAVIRUS 2 (SARS-COV-2) (CORONAVIRUS DISEASE [COVID-19]), AMPLIFIED PROBE TECHNIQUE, MAKING USE OF HIGH THROUGHPUT TECHNOLOGIES AS DESCRIBED BY CMS-2020-01-R: HCPCS

## 2021-10-22 ASSESSMENT — PAIN SCALES - GENERAL: PAINLEVEL: NO PAIN (0)

## 2021-10-22 NOTE — PROGRESS NOTES
[facility]  [address] [place] [address]  [place] [address]  Phone: [phone]  Primary Provider: Anya Nowak  Pre-op Performing Provider: GUNJAN SARABIA       PREOPERATIVE EVALUATION:  Today's date: 10/22/2021    Haresh Rock is a 71 year old male who presents for a preoperative evaluation.    Surgical Information:  Surgery/Procedure: feeding tube inserted  Surgery Location:   Surgeon: Viky  Surgery Date: 10/25/2021  Time of Surgery: 8:00am  Where patient plans to recover: At home alone  Fax number for surgical facility: Note does not need to be faxed, will be available electronically in Epic.    Type of Anesthesia Anticipated: General         Subjective     HPI related to upcoming procedure: 71 year old male with swallowing problem due to Parkinsons; lost a lot of wt.  To have feeding tube put in.    Preop Questions 10/22/2021   1. Have you ever had a heart attack or stroke? No   2. Have you ever had surgery on your heart or blood vessels, such as a stent placement, a coronary artery bypass, or surgery on an artery in your head, neck, heart, or legs? No   3. Do you have chest pain with activity? No   4. Do you have a history of  heart failure? No   5. Do you currently have a cold, bronchitis or symptoms of other infection? No   6. Do you have a cough, shortness of breath, or wheezing? No   7. Do you or anyone in your family have previous history of blood clots? YES -  In 2004 had pulmonary embolism   8. Do you or does anyone in your family have a serious bleeding problem such as prolonged bleeding following surgeries or cuts? No   9. Have you ever had problems with anemia or been told to take iron pills? No   10. Have you had any abnormal blood loss such as black, tarry or bloody stools? No   11. Have you ever had a blood transfusion? YES -    11a. Have you ever had a transfusion reaction? No   12. Are you willing to have a blood transfusion if it is medically needed  before, during, or after your surgery? Yes   13. Have you or any of your relatives ever had problems with anesthesia? No   14. Do you have sleep apnea, excessive snoring or daytime drowsiness? No   15. Do you have any artifical heart valves or other implanted medical devices like a pacemaker, defibrillator, or continuous glucose monitor? No   16. Do you have artificial joints? YES -   17. Are you allergic to latex? No       Health Care Directive:  Patient has a Health Care Directive on file      Preoperative Review of :  Patient not on any controlled  Substances             Review of Systems  Constitutional, neuro, ENT, endocrine, pulmonary, cardiac, gastrointestinal, genitourinary, musculoskeletal, integument and psychiatric systems are negative, except as otherwise noted.    Had procedure in August for enlarged prostate    On warfarin for history of atrial fibrillation and past pulmonary embolism    History of myeloprlifferative disorder and lymphonma    parkinsons stable; no tremor; no meds for parkinson    Gets occasional fevers, sporadic.  Has seen infectious disease specialist    Patient Active Problem List    Diagnosis Date Noted     Atrial fibrillation (H) 03/04/2013     Priority: High     Hyperlipidemia with target LDL less than 100 09/11/2012     Priority: High     Status post bone marrow transplant (H) 08/29/2012     Priority: High     Polyneuropathy      Priority: High     Bqkzc-sfydmf-fakq disease, chronic (H) 05/21/2011     Priority: High     Hemochromatosis 06/02/2010     Priority: High     Parkinson's disease (H) 08/19/2021     Priority: Medium     Constipation 07/27/2021     Priority: Medium     Benign prostatic hyperplasia with urinary retention      Priority: Medium     Paroxysmal atrial fibrillation (H) 06/18/2021     Priority: Medium     Thrombocytopenia (H) 06/08/2021     Priority: Medium     Abnormal dopamine scan (DaTSCAN) 2020 11/04/2020     Priority: Medium     [phone] 11/3/2020        Narrative & Impression     Examination: Nuclear medicine DATscan for Dopamine Receptor  Localization.    Examination: NM Brain Image Tomographic (SPECT) DATscan     Date: 11/3/2020 3:21 PM     Indication: Parkinsonism     Comparison: None     Additional Information: none     Interfering Medications: None     Technique:     The patient initially received 1 ml of Lugol's solution orally prior  to the injection of 4.77 mCi of I-123 Ioflupane intravenously.     After 3 hours of uptake time the patient was imaged on a dual headed  SPECT scanner using LOREN collimators. The patients head was  immobilized prior to scanning to reduce motion. The head was scanned  with a FOV of 15 cm at 3 degrees per stop over 360 degrees, 128 x 128  x 16 bit resolution, at 30 seconds per stop to yield greater than 1.5  million counts.     Images were reconstructed using a iterative reconstruction technique.  Semi-quantitative analysis was performed as a ratio of Putamen to  Caudate Nucleus of the right and left brain when the Caudate uptake  appeared normal.     2. Technical Quality: Excellent     Subjective findings:     Relatively symmetric radiotracer uptake to the basal ganglia. There is  decreased uptake to putamina bilaterally.     Ratio Semi-quantitative analysis to Normalized (Occipital) Region:  (Ratios of activity in Caudate, Putamen and Striatum (combined)  normalized to background region (occipital cortex) and compared  against an age matched normal group.     Right Caudate  ratio: 2.1,  Right Putamen: 2.0, Right Striatum: 2.1  Left Caudate  ratio: 2.2, Left Putamen: 1.8, Left Striatum: 2.1     Normal ratios:  (normal range is 2.5-8)                                                                      Impression:  A presynaptic dopaminergic deficit is demonstrated bilaterally.        I have personally reviewed the examination and initial interpretation  and I agree with the findings.     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  from iron overload       Oropharyngeal dysphagia 09/12/2020     Priority: Medium     Hereditary hemochromatosis (H) 07/06/2020     Priority: Medium     Primary pulmonary hypertension (H)      Priority: Medium     History of Hodgkin's lymphoma 03/24/2017     Priority: Medium     History of irradiation, presenting hazards to health 03/24/2017     Priority: Medium     Type 2 diabetes mellitus with stage 2 chronic kidney disease, without long-term current use of insulin (H) 09/20/2016     Priority: Medium     Thyroid nodule 05/17/2016     Priority: Medium     Long-term (current) use of anticoagulants [Z79.01] 04/19/2016     Priority: Medium     Chronic rhinitis 07/09/2014     Priority: Medium     Gallstones without obstruction of gallbladder 07/09/2014     Priority: Medium     ACP (advance care planning) 03/29/2012     Priority: Medium     Formatting of this note is different from the original.  Patient has identified Health Care Agent(s): Yes  Add Health Care Agents: Yes    Health Care Agent(s):  Primary Health Care Agent: sheyla Relationship: wife Phone:    Secondary Health Care Agent:  Relationship:  Phone:    Conservator:  Relationship:  Phone:    Guardian: Relationship:  Phone:      Patient has Advance Care Plan Documents (Health Care Directive, POLST): No, .    Patient has identified Specific Treatment Preferences: Yes   Specific Treatment Preferences: a.) Code Status:  CPR/Attempt Resuscitation       Personal history of PE (pulmonary embolism) 03/28/2012     Priority: Medium     Status post total knee replacements 04/24/2012     Priority: Low     Dr. Eber Toth, [facility]       Advanced directives, counseling/discussion 03/21/2012     Priority: Low     Living will on  file 1/11/2018        Past Medical History:   Diagnosis Date     Abnormal dopamine scan (DaTSCAN) 2020 11/04/2020    [phone] 11/3/2020   Narrative & Impression   Examination: Nuclear medicine DATscan for Dopamine Receptor Localization.   Examination: NM Brain Image Tomographic (SPECT) DATscan   Date: 11/3/2020 3:21 PM   Indication: Parkinsonism   Comparison: None   Additional Information: none   Interfering Medications: None   Technique:   The patient initially received 1 ml of Lugol's solution orally prior     Antiphospholipid antibody syndrome (H) 2005    Ravi's     Antiphospholipid antibody syndrome (H)     Ravi's, noted negative per testing re-done in 2008 in hematology note 01/13/2009     Articulation disorder 09/23/2020     Atrial fibrillation (H) 04/2011     Benign prostatic hyperplasia with urinary retention      Cirrhosis (H)      CKD (chronic kidney disease) stage 2, GFR 60-89 ml/min      Diabetes mellitus type II     steroid induced     DJD (degenerative joint disease) of knee     SHAWN, worse on right     DVT (deep venous thrombosis) (H)      ED (erectile dysfunction)      Gallbladder polyp 05/2010     Pxxjt-Vcysxu-Ezwd Disease 2007    BMT     Hemochromatosis      Hodgkin's disease NOS     5 cycles of ABVD in 2011     HTN (hypertension)      Hyperlipidaemia LDL goal <100      Multiple thyroid nodules     benign      Myeloproliferative disorder (H)     atypical, [facility]     Parkinson disease (H) 09/24/2020     PE (pulmonary embolism) 11/2004     Polyneuropathy      Primary pulmonary hypertension (H)      Thrombocytopenia (H) 06/08/2021     Past Surgical History:   Procedure Laterality Date     ANESTHESIA CARDIOVERSION  4/21/2011    Procedure:ANESTHESIA CARDIOVERSION; Surgeon:GENERIC ANESTHESIA PROVIDER; Location:[facility] OR     ARTHROSCOPY KNEE RT/LT      LT     CATARACT IOL, RT/LT  2017     COLONOSCOPY  10/16/2012    Procedure: COLONOSCOPY;  Colonoscopy, screening;  Surgeon: Reg Feliciano MD;   Location: [facility] OR     COLONOSCOPY N/A 4/14/2021    Procedure: COLONOSCOPY;  Surgeon: Reg Holm MD;  Location: [facility] GI     ESOPHAGOSCOPY, GASTROSCOPY, DUODENOSCOPY (EGD), COMBINED N/A 10/7/2020    Procedure: ESOPHAGOGASTRODUODENOSCOPY, WITH BIOPSY;  Surgeon: Raul Sawyer DO;  Location: [facility] OR     H ABLATION FOCAL AFIB  3/5/2013    PVI     H ABLATION FOCAL AFIB  01/01/2018     HC BONE MARROW/STEM TRANSPLANT ALLOGENIC  5/8/2007     INSERT PORT VASCULAR ACCESS       JOINT REPLACEMTN, KNEE RT/LT  3/28/12    SHAWN     VASECTOMY  1990     Current Outpatient Medications   Medication Sig Dispense Refill     acetaminophen (TYLENOL) 500 MG tablet 500mg tab as needed       amoxicillin (AMOXIL) 500 MG capsule 4 x 500mg tabs by mouth 1 hour before dental appointments.       calcium carbonate (TUMS) 500 MG chewable tablet Take 1 chew tab by mouth daily as needed for heartburn       carbidopa-levodopa (SINEMET)  MG tablet Not taking 90 tablet 11     dutasteride (AVODART) 0.5 MG capsule Take 1 capsule (0.5 mg) by mouth daily 90 capsule 3     flecainide (TAMBOCOR) 50 MG tablet TAKE 1 TABLET BY MOUTH TWICE A  tablet 0     fluticasone (FLONASE) 50 MCG/ACT nasal spray Spray 1 spray into both nostrils daily as needed for rhinitis 16 g 11     loratadine (CLARITIN) 10 MG tablet Take 10 mg by mouth daily as needed        metoprolol succinate ER (TOPROL XL) 25 MG 24 hr tablet Take 1 tablet (25 mg) by mouth daily In the evening 90 tablet 3     multivitamin (CENTRUM SILVER) tablet Take 1 tablet by mouth daily        PREVIDENT 5000 DRY MOUTH 1.1 % GEL topical gel Take by mouth daily  3     Pseudoeph-Doxylamine-DM-APAP (NYQUIL PO) As needed       rosuvastatin (CRESTOR) 40 MG tablet Take 1 tablet (40 mg) by mouth At Bedtime 90 tablet 3     senna-docusate (SENOKOT-S/PERICOLACE) 8.6-50 MG tablet Take 2 tablets by mouth daily as needed for constipation       Starch-Maltodextrin (THICK-IT PO)        vitamin D3 (CHOLECALCIFEROL)  50 mcg (2000 units) tablet Take 1 tablet (50 mcg) by mouth daily       warfarin ANTICOAGULANT (COUMADIN) 2.5 MG tablet TAKE 3.75 MG EVERY FRIDAY AND 2.5 MG ALL OTHER DAYS OR AS DIRECTED BY ACC. 90 tablet 3       Allergies   Allergen Reactions     Seasonal Allergies         Social History     Tobacco Use     Smoking status: Never Smoker     Smokeless tobacco: Never Used   Substance Use Topics     Alcohol use: No        History   Drug Use No         Objective     /69 (BP Location: Left arm, Patient Position: Chair, Cuff Size: Adult Regular)   Pulse 63   Temp 97.1  F (36.2  C) (Temporal)   Wt 66.2 kg (146 lb)   BMI 20.44 kg/m      Physical Exam    GENERAL APPEARANCE: healthy, alert and no distress     EYES: EOMI,  PERRL     HENT: ear canals and TM's normal and nose and mouth without ulcers or lesions     NECK: no adenopathy, no asymmetry, masses, or scars and thyroid normal to palpation     RESP: lungs clear to auscultation - no rales, rhonchi or wheezes     CV: regular rates and rhythm, normal S1 S2, no S3 or S4 and no murmur, click or rub     ABDOMEN:  soft, nontender, no HSM or masses and bowel sounds normal     MS: extremities normal- no gross deformities noted, no evidence of inflammation in joints, FROM in all extremities.     SKIN: no suspicious lesions or rashes     NEURO: Normal strength and tone, sensory exam grossly normal, mentation intact and speech normal     PSYCH: mentation appears normal. and affect normal/bright     LYMPHATICS: No cervical adenopathy    Recent Labs   Lab Test 10/20/21  0929 10/16/21  1800 10/15/21  1107 10/14/21  1126 10/14/21  1125 08/21/21  0658 08/20/21  1453 08/20/21  0752 05/28/21  0000 05/25/21  1509   HGB  --   --   --  14.9  --   --   --  13.7   < > 15.2   PLT  --   --   --  130*  --   --   --  109*   < > 144*   INR 2.5* 3.0*   < >  --   --    < >  --  3.41*   < >  --    NA  --   --   --   --  138  --   --  136   < > 137   POTASSIUM  --   --   --   --  3.6  --   --   3.7   < > 4.3   CR  --   --   --   --  1.23  --   --  0.99   < > 1.25   A1C  --   --   --   --   --   --  5.5  --   --  5.4    < > = values in this interval not displayed.        Diagnostics:      See  Recent  Echo and cardiac MRI, overall good    Had recent CT chest done    See recent labs in chart    INR 2.5 two days ago       Revised Cardiac Risk Index (RCRI):  The patient has the following serious cardiovascular risks for perioperative complications:   - No serious cardiac risks = 0 points     RCRI Interpretation: 1 point: Class II (low risk - 0.9% complication rate)    ASSESSMENT / PLAN:  (Z01.818) Preoperative examination  (primary encounter diagnosis)  Comment: patient to have feeding tube placed in 3 [facility]  Plan: as above       Okay for procedure    (G20) Parkinson's disease (H)  Comment: this is the underlying problem behind swallowing difficulties  Plan: as above     (R13.10) Swallowing problem  Comment: this  Has led to aspiration   Plan: to have feeding tube placed      Patient had extensive  Cardiac evaluation recently, overall heart function good    Patient is currently holding the warfarin in anticipation of this procedure    recentlly therapeutic on inr    Not  On  Any other blood thinners    He takes metoprolol at night  Flecainide is 2x daily  Stay on both these meds prior to procedure    No history of anesthesia problems    No  Current illness type symptoms       I reviewed the patient's medications, allergies, medical history, family history, and social history.    Nic Shirley MD           Signed Electronically by: Nic Shirley MD  Copy of this evaluation report is provided to requesting physician.

## 2021-10-22 NOTE — PATIENT INSTRUCTIONS
Hold warfarin until after procedure    Continue other meds as is    Call with problems/ questions   [No Acute Distress] : no acute distress [Well Nourished] : well nourished [Well Developed] : well developed [Well-Appearing] : well-appearing [Normal Voice/Communication] : normal voice/communication [Normal Sclera/Conjunctiva] : normal sclera/conjunctiva [PERRL] : pupils equal round and reactive to light [EOMI] : extraocular movements intact [Normal Outer Ear/Nose] : the outer ears and nose were normal in appearance [Normal TMs] : both tympanic membranes were normal [No JVD] : no jugular venous distention [No Lymphadenopathy] : no lymphadenopathy [Supple] : supple [Thyroid Normal, No Nodules] : the thyroid was normal and there were no nodules present [No Respiratory Distress] : no respiratory distress  [No Accessory Muscle Use] : no accessory muscle use [Clear to Auscultation] : lungs were clear to auscultation bilaterally [Normal Rate] : normal rate  [Regular Rhythm] : with a regular rhythm [Normal S1, S2] : normal S1 and S2 [No Murmur] : no murmur heard [No Carotid Bruits] : no carotid bruits [No Abdominal Bruit] : a ~M bruit was not heard ~T in the abdomen [No Varicosities] : no varicosities [Pedal Pulses Present] : the pedal pulses are present [No Edema] : there was no peripheral edema [No Palpable Aorta] : no palpable aorta [No Extremity Clubbing/Cyanosis] : no extremity clubbing/cyanosis [Soft] : abdomen soft [Non Tender] : non-tender [Non-distended] : non-distended [No Masses] : no abdominal mass palpated [No HSM] : no HSM [Normal Bowel Sounds] : normal bowel sounds [Normal Supraclavicular Nodes] : no supraclavicular lymphadenopathy [Normal Posterior Cervical Nodes] : no posterior cervical lymphadenopathy [Normal Anterior Cervical Nodes] : no anterior cervical lymphadenopathy [No CVA Tenderness] : no CVA  tenderness [No Spinal Tenderness] : no spinal tenderness [No Joint Swelling] : no joint swelling [Grossly Normal Strength/Tone] : grossly normal strength/tone [No Rash] : no rash [Coordination Grossly Intact] : coordination grossly intact [No Focal Deficits] : no focal deficits [Normal Gait] : normal gait [Deep Tendon Reflexes (DTR)] : deep tendon reflexes were 2+ and symmetric [Speech Grossly Normal] : speech grossly normal [Memory Grossly Normal] : memory grossly normal [Normal Affect] : the affect was normal [Alert and Oriented x3] : oriented to person, place, and time [Normal Mood] : the mood was normal [Normal Insight/Judgement] : insight and judgment were intact

## 2021-10-25 ENCOUNTER — APPOINTMENT (OUTPATIENT)
Dept: MEDSURG UNIT | Facility: CLINIC | Age: 71
End: 2021-10-25
Attending: RADIOLOGY
Payer: COMMERCIAL

## 2021-10-25 ENCOUNTER — HOSPITAL ENCOUNTER (OUTPATIENT)
Facility: CLINIC | Age: 71
Discharge: HOME | End: 2021-10-25
Attending: RADIOLOGY | Admitting: RADIOLOGY
Payer: COMMERCIAL

## 2021-10-25 ENCOUNTER — DOCUMENTATION ONLY (OUTPATIENT)
Dept: ANTICOAGULATION | Facility: CLINIC | Age: 71
End: 2021-10-25

## 2021-10-25 ENCOUNTER — APPOINTMENT (OUTPATIENT)
Dept: INTERVENTIONAL RADIOLOGY/VASCULAR | Facility: CLINIC | Age: 71
End: 2021-10-25
Attending: NURSE PRACTITIONER
Payer: COMMERCIAL

## 2021-10-25 VITALS
HEIGHT: 71 IN | WEIGHT: 142 LBS | OXYGEN SATURATION: 99 % | TEMPERATURE: 98 F | SYSTOLIC BLOOD PRESSURE: 149 MMHG | DIASTOLIC BLOOD PRESSURE: 81 MMHG | HEART RATE: 61 BPM | BODY MASS INDEX: 19.88 KG/M2 | RESPIRATION RATE: 16 BRPM

## 2021-10-25 DIAGNOSIS — G20.A1 PARKINSON DISEASE (H): ICD-10-CM

## 2021-10-25 DIAGNOSIS — R63.4 WEIGHT LOSS: ICD-10-CM

## 2021-10-25 DIAGNOSIS — R13.10 DYSPHAGIA, UNSPECIFIED TYPE: ICD-10-CM

## 2021-10-25 LAB
INR PPP: 1.41 (ref 0.85–1.15)
PLATELET # BLD AUTO: 132 10E3/UL (ref 150–450)

## 2021-10-25 PROCEDURE — 255N000002 HC RX 255 OP 636: Performed by: RADIOLOGY

## 2021-10-25 PROCEDURE — 36415 COLL VENOUS BLD VENIPUNCTURE: CPT | Performed by: NURSE PRACTITIONER

## 2021-10-25 PROCEDURE — 258N000003 HC RX IP 258 OP 636: Performed by: NURSE PRACTITIONER

## 2021-10-25 PROCEDURE — 99152 MOD SED SAME PHYS/QHP 5/>YRS: CPT | Performed by: RADIOLOGY

## 2021-10-25 PROCEDURE — 49440 PLACE GASTROSTOMY TUBE PERC: CPT

## 2021-10-25 PROCEDURE — 272N000151 HC KIT CR11

## 2021-10-25 PROCEDURE — 49440 PLACE GASTROSTOMY TUBE PERC: CPT | Performed by: RADIOLOGY

## 2021-10-25 PROCEDURE — 99152 MOD SED SAME PHYS/QHP 5/>YRS: CPT

## 2021-10-25 PROCEDURE — 250N000009 HC RX 250: Performed by: STUDENT IN AN ORGANIZED HEALTH CARE EDUCATION/TRAINING PROGRAM

## 2021-10-25 PROCEDURE — 272N000588 ZZ HC TUBE GASTRO CR5

## 2021-10-25 PROCEDURE — 999N000142 HC STATISTIC PROCEDURE PREP ONLY

## 2021-10-25 PROCEDURE — 250N000011 HC RX IP 250 OP 636: Performed by: RADIOLOGY

## 2021-10-25 PROCEDURE — 85049 AUTOMATED PLATELET COUNT: CPT | Performed by: NURSE PRACTITIONER

## 2021-10-25 PROCEDURE — 999N000133 HC STATISTIC PP CARE STAGE 2

## 2021-10-25 PROCEDURE — 85610 PROTHROMBIN TIME: CPT | Performed by: NURSE PRACTITIONER

## 2021-10-25 PROCEDURE — C1887 CATHETER, GUIDING: HCPCS

## 2021-10-25 PROCEDURE — 250N000011 HC RX IP 250 OP 636: Performed by: STUDENT IN AN ORGANIZED HEALTH CARE EDUCATION/TRAINING PROGRAM

## 2021-10-25 PROCEDURE — 99153 MOD SED SAME PHYS/QHP EA: CPT

## 2021-10-25 PROCEDURE — C1769 GUIDE WIRE: HCPCS

## 2021-10-25 PROCEDURE — 250N000011 HC RX IP 250 OP 636: Performed by: NURSE PRACTITIONER

## 2021-10-25 RX ORDER — IODIXANOL 320 MG/ML
50 INJECTION, SOLUTION INTRAVASCULAR ONCE
Status: COMPLETED | OUTPATIENT
Start: 2021-10-25 | End: 2021-10-25

## 2021-10-25 RX ORDER — NALOXONE HYDROCHLORIDE 0.4 MG/ML
0.2 INJECTION, SOLUTION INTRAMUSCULAR; INTRAVENOUS; SUBCUTANEOUS
Status: DISCONTINUED | OUTPATIENT
Start: 2021-10-25 | End: 2021-10-25 | Stop reason: HOSPADM

## 2021-10-25 RX ORDER — NALOXONE HYDROCHLORIDE 0.4 MG/ML
0.4 INJECTION, SOLUTION INTRAMUSCULAR; INTRAVENOUS; SUBCUTANEOUS
Status: DISCONTINUED | OUTPATIENT
Start: 2021-10-25 | End: 2021-10-25 | Stop reason: HOSPADM

## 2021-10-25 RX ORDER — CEFAZOLIN SODIUM 2 G/100ML
2 INJECTION, SOLUTION INTRAVENOUS
Status: COMPLETED | OUTPATIENT
Start: 2021-10-25 | End: 2021-10-25

## 2021-10-25 RX ORDER — SODIUM CHLORIDE 9 MG/ML
INJECTION, SOLUTION INTRAVENOUS CONTINUOUS
Status: DISCONTINUED | OUTPATIENT
Start: 2021-10-25 | End: 2021-10-25 | Stop reason: HOSPADM

## 2021-10-25 RX ORDER — FLUMAZENIL 0.1 MG/ML
0.2 INJECTION, SOLUTION INTRAVENOUS
Status: DISCONTINUED | OUTPATIENT
Start: 2021-10-25 | End: 2021-10-25 | Stop reason: HOSPADM

## 2021-10-25 RX ORDER — FENTANYL CITRATE 50 UG/ML
25-50 INJECTION, SOLUTION INTRAMUSCULAR; INTRAVENOUS EVERY 5 MIN PRN
Status: DISCONTINUED | OUTPATIENT
Start: 2021-10-25 | End: 2021-10-25 | Stop reason: HOSPADM

## 2021-10-25 RX ADMIN — FENTANYL CITRATE 25 MCG: 50 INJECTION, SOLUTION INTRAMUSCULAR; INTRAVENOUS at 10:48

## 2021-10-25 RX ADMIN — IODIXANOL 20 ML: 320 INJECTION, SOLUTION INTRAVASCULAR at 11:13

## 2021-10-25 RX ADMIN — SODIUM CHLORIDE: 9 INJECTION, SOLUTION INTRAVENOUS at 08:50

## 2021-10-25 RX ADMIN — MIDAZOLAM 0.5 MG: 1 INJECTION INTRAMUSCULAR; INTRAVENOUS at 10:48

## 2021-10-25 RX ADMIN — GLUCAGON HYDROCHLORIDE 0.5 MG: 1 INJECTION, POWDER, FOR SOLUTION INTRAMUSCULAR; INTRAVENOUS; SUBCUTANEOUS at 10:42

## 2021-10-25 RX ADMIN — CEFAZOLIN SODIUM 2 G: 2 INJECTION, SOLUTION INTRAVENOUS at 08:50

## 2021-10-25 RX ADMIN — LIDOCAINE HYDROCHLORIDE 7 ML: 10 INJECTION, SOLUTION EPIDURAL; INFILTRATION; INTRACAUDAL; PERINEURAL at 10:51

## 2021-10-25 RX ADMIN — GLUCAGON HYDROCHLORIDE 0.5 MG: 1 INJECTION, POWDER, FOR SOLUTION INTRAMUSCULAR; INTRAVENOUS; SUBCUTANEOUS at 10:48

## 2021-10-25 RX ADMIN — FENTANYL CITRATE 25 MCG: 50 INJECTION, SOLUTION INTRAMUSCULAR; INTRAVENOUS at 10:29

## 2021-10-25 RX ADMIN — MIDAZOLAM 0.5 MG: 1 INJECTION INTRAMUSCULAR; INTRAVENOUS at 10:29

## 2021-10-25 ASSESSMENT — MIFFLIN-ST. JEOR: SCORE: 1411.24

## 2021-10-25 NOTE — SEDATION DOCUMENTATION
Primary MD office called back, stating MD in meeting. MD will call back to find out plan for G tube later today.

## 2021-10-25 NOTE — SEDATION DOCUMENTATION
Pt prepared for procedure on 2A. Son at bedside. Consent signed. Awaiting labs to result at this time. All questions answered at this time. Will continue to monitor pt until he leaves for procedure.

## 2021-10-25 NOTE — SEDATION DOCUMENTATION
Pt arrives to 2A post procedure. G tube is to gravity bag at time of arrival. Pt informed of NPO status for 4 hours and bedrest for an hour. Pt is understanding and able to teach back. VSS. Pt requesting to sleep at this time. Family updated with plan of care and pt status per pt request.

## 2021-10-25 NOTE — SEDATION DOCUMENTATION
Dr. Arboleda confirmed no PO contrast prior to procedure. Pt did not take contrast yesterday at home, MD aware.

## 2021-10-25 NOTE — SEDATION DOCUMENTATION
Patient tolerated recovery stage well. VSS, R PEG site clean/dry/intact, no hematoma, and minimal pain present with movement. Teaching was done and discharge instructions were given. Patient ambulated, voided, and PIV was removed. Patient discharged from the hospital via wheel chair to home with his daughter.

## 2021-10-25 NOTE — SEDATION DOCUMENTATION
Unable to reach primary MD taking care of pt. IR team updated on situation. Pt instructed to follow up with primary care team and nutrition services.

## 2021-10-25 NOTE — PROGRESS NOTES
Patient Name: aHresh Rock  Medical Record Number: [id number]  Today's Date: 10/25/37553    Procedure: Image guided gastrostomy tube placement  Proceduralist: Dr. Salmon  Pathology present: NA    Procedure Start: 1039  Procedure end: 1112  Sedation medications administered:    Fentanyl 50mcg   Midazolam 1mg     Report given to: Chelsey KENYON  : SAVITA    Other Notes: Pt arrived to IR room 4 from . Consent reviewed. Pt denies any questions or concerns regarding procedure. Pt positioned supine and monitored per protocol. Pt tolerated procedure without any noted complications. Pt transferred back to .

## 2021-10-25 NOTE — DISCHARGE INSTRUCTIONS
Interventional Radiology  Patient Discharge Instructions  Post Tube Placement:  Gastrostomy/Gastro-Jejunostomy    For some patients, the tube puts food and medicine into the body. For others, it drains fluid from the stomach. Your doctor will decide how long you will have the tube.    A disc or balloon inside the body will hold the tube in place. The balloon is filled with water.  You may notice one or two  anchors  (buttons, stitches, T-tacks, or T-fasteners) around your tube/site. These hold the stomach to the inside wall of the belly.    Self-care the first few days  Do not eat or drink for the first four hours. (You may take medicine with small sips of water.)  Stick to liquids for the first day and then advance your diet as tolerated, if you are able to take in oral foods.  If you are starting tube feedings, this should have been prearranged with your clinic.    Check your tube site often. Call your doctor if your side effects of pain, redness or bleeding get worse. It is normal to have some drainage (fluid leaking) around the tube.    Limit heavy activity (lifting, straining or exercise).     If you received IV medicine to make you sleepy: You may feel drowsy, forgetful and unsteady.     No driving until the day after surgery. No alcohol for 24 hours.    Activity   You may resume your normal activities as tolerated, however, most patients have site pain for about a week following placement.  Minimal use of your abdominal muscles will decrease the pain.  Keep the tube secure at all times (you may tape it to your skin or use the Flexi-Trak device) and avoid tugging on it.  Bathing  You may shower 24 hours after the tube is placed.  Remove the dressing before your shower, reapply afterwards.  DO NOT submerge in water of any kind for 3 weeks after placement.  Once the site is healed, which is around 3 weeks, for most patients, you may go in bathtubs, spas and swimming pools where the water is clean or chlorinated.   Be sure the caps are tight.  Your doctor may ask you to cover the tube site with a plastic bandage when swimming.    Do not swim in lakes, oceans or non-chlorinated public [place] for the duration of your tube being in place.  When swimming or any activity where the tube could be inadvertently pulled, wearing a t-shirt or one piece bathing suit (for women) decreases the risk.  Basic tube care  Always wash your hands before touching the tube.    Do not use ointment or hydrogen peroxide near the tube. You may use a thin layer of skin balm, like Desitin around the site after it is clean and dry.     After 3 weeks:  If you have a G-tube:  - Once each day, pull gently on the tube.  It should move in and out slightly (about   to    inch). You may see a slight rounding of the skin as you pull up. If the tube is too tight to move in and out, call your nurse or doctor.  - We may ask you to gently rotate the tube. If so, roll it between your thumb and forefinger.  This breaks up any flaps of tissue that have formed around the tube inside the stomach.  Never rotate a J-tube or a GJ-tube.    Changing the bandage  Wear a bandage until the site has healed and there is no leaking fluid. Change the bandage at least once a day and each time it gets wet or soiled.  1. Gather these supplies:  - Plastic bag  - Gauze or split gauze (4×4 or 2×2)  - Paper tape (1 or 2 inches wide)  2. Clean your work area with household  and water (or rubbing alcohol). Wash your hands.  3. Remove the old bandage. Place it in the bag.  4. Wash your hands again. Clean the tube site.  5. Replace the bandage.     If you have a disk: Slide split gauze under and around the tube (or use two pieces of gauze).  Tape the gauze in place.   If you don t have a disk: Fold one piece of gauze in half, placing it below the tube site.  Fold another piece of gauze in half, placing it over the tube site. Tape the gauze in place.  6. Secure the tube to the skin. If  you don t have a disk, be sure the tube is at a 90-degree angle.   First, place a piece of a tape across the skin. Then, use a second piece of tape to secure the tube over the first piece.  7 Do not attach the tube to clothing, except during feedings.    Flushing Your Tube  Flush the tube with water (clean, drinkable tap water is fine) before and after each feeding, as well as between medications if you choose to use your tube to instill medications.    If you are not using your tube, it needs to be flushed with 20-30 cc of clean tap water twice daily.    Follow-up  Contact the Interventional Radiology Clinic to schedule your suture removal two weeks after tube placement at [phone]  Routine tube changes are recommended every 6-9 months.  After 6 weeks most tubes can be changed to a skin level,  button  type device.  Tube removals are performed in our clinic    The following questions should be first directed to the doctor that referred you for the feeding tube  Which feeding formula to use?  How long will the tube be needed?  Calorie requirement for your specific problem?  How much water you need to give through your tube daily?  Nausea, vomiting, diarrhea, or weight loss problems.    When to call for help  Call your doctor if you have    A fever that is:  - over 101 F (38.3 C) if taken under the tongue  - over 100 F (37.8 C) if taken under your arm.    Vomiting (throwing up)    Diarrhea (loose, watery stools)    Constipation (hard, dry stools) for more than 24 hours  Call your care coordinator or Interventional Radiology if you have:    Bleeding, drainage or swelling at the tube site.    A painful, bright red rash    Severe pain or pain that does not improve with medicine.    A plugged tube and you cannot flush it.    A tube that has come out.    Fluid leaking around the tube.    Any questions or problems with your tube.  If you need help after business hours or on a holiday, go to Emergency unless you can  reach home care or your care coordinator.  Phone numbers  ? [facility]:    [place]: [phone]  * After 4pm and on weekends, call the hospital  at [phone] and ask to have the on call Interventional Radiologist paged.

## 2021-10-25 NOTE — PROGRESS NOTES
ANTICOAGULATION  MANAGEMENT: Discharge Review    Haresh Rock chart reviewed for anticoagulation continuity of care    Outpatient surgery/procedure on 10/25/21 for G tub placement.    Discharge disposition: Home    Results:    Recent labs: (last 7 days)     10/20/21  0929 10/25/21  0851   INR 2.5* 1.41*     Anticoagulation inpatient management:     not applicable     Anticoagulation discharge instructions:     Warfarin dosing: home regimen continued   Bridging: No   INR goal change: No      Medication changes affecting anticoagulation: Yes: Per pre-op visit on 10/22/21, pt has been holding warfarin in anticipation for procedure and will resume following procedure pending no issues or complications.     Additional factors affecting anticoagulation: No    Plan     No adjustment to anticoagulation plan needed  - Patient scheduled to recheck INR on 10/27/21 per last [facility] encounter.    Patient not contacted    No adjustment to Anticoagulation Calendar was required    Martín Rico RN

## 2021-10-25 NOTE — PROCEDURES
[facility] Brief Procedure Note    Pre-operative diagnosis: Dysphagia, aspiration   Post-operative diagnosis Same   Procedure: Percutaneous gastrostomy tube placement   Surgeon: Anais Ty MD   Assistants(s): -   Estimated blood loss: Minimal        Findings: Uneventful placement of balloon retention 18 [place] gastrostomy tube. Ready for use after 4 hours NPO per orders.   Complications: None.

## 2021-10-25 NOTE — PRE-PROCEDURE
GENERAL PRE-PROCEDURE:   Procedure:  Gastrostomy tube placement  Date/Time:  10/25/2021 8:26 AM    Verbal consent obtained?: Yes    Written consent obtained?: Yes    Risks and benefits: Risks, benefits and alternatives were discussed    Consent given by:  Patient  Patient states understanding of procedure being performed: Yes    Patient's understanding of procedure matches consent: Yes    Procedure consent matches procedure scheduled: Yes    Expected level of sedation:  Moderate  Appropriately NPO:  Yes  ASA Class:  2  Mallampati  :  Grade 1- soft palate, uvula, tonsillar pillars, and posterior pharyngeal wall visible  Lungs:  Lungs clear with good breath sounds bilaterally  Heart:  Normal heart sounds and rate  History & Physical reviewed:  History and physical reviewed and no updates needed  Statement of review:  I have reviewed the lab findings, diagnostic data, medications, and the plan for sedation

## 2021-10-26 DIAGNOSIS — G20.A1 PARKINSON'S DISEASE (H): Primary | ICD-10-CM

## 2021-10-26 DIAGNOSIS — R13.12 OROPHARYNGEAL DYSPHAGIA: ICD-10-CM

## 2021-10-26 NOTE — PROGRESS NOTES
Nutrition Services:     Patient has his PEG tube placed for EN on 10/25 with PLC education.   Received message from patient that he has not been set up to receive supplies and doesn't have appt with writer until 12/1.      Interventions:  RD placed EN recommendations and orders for the following.   RD moved patients appt from 12/1 to 11/1.   RD sent message to [facility] intake to check benefits    RECOMMENDATIONS FOR MD/PROVIDER TO ORDER - ALREADY ORDERED BY RD:   Enteral Nutrition   Formula: Isosource 1.5 dee  Volume: 6 cartons/day (1500mL, 1140ml free water)  Provisions:  2250kcal (32kcal/kg), 102g protein (1.4g/kg), 264g CHO, 22g fiber        Suggested Tube feeding schedule via gravity bag feedings  Day 1: 1 carton formula TID spread 3-4 hours apart   Day 2: 1 1/2 cartons formula TID spread 3-4 hours apart   Day 3: 2 cartons formula TID spread 3-4 hours apart    Flush with 30-60mL water before and after each feeding  Flush with additional 120 mL water QID to meet 100% hydration        Scheduled to see RD on Tue Nov 1st at 11am    Fernanda Nobles RDN, LDN  [facility]  [phone]

## 2021-10-27 ENCOUNTER — TELEPHONE (OUTPATIENT)
Facility: CLINIC | Age: 71
End: 2021-10-27

## 2021-10-27 ENCOUNTER — ANTICOAGULATION THERAPY VISIT (OUTPATIENT)
Dept: FAMILY MEDICINE | Facility: CLINIC | Age: 71
End: 2021-10-27

## 2021-10-27 ENCOUNTER — DOCUMENTATION ONLY (OUTPATIENT)
Dept: FAMILY MEDICINE | Facility: CLINIC | Age: 71
End: 2021-10-27

## 2021-10-27 ENCOUNTER — TELEPHONE (OUTPATIENT)
Dept: FAMILY MEDICINE | Facility: CLINIC | Age: 71
End: 2021-10-27
Payer: COMMERCIAL

## 2021-10-27 ENCOUNTER — OFFICE VISIT (OUTPATIENT)
Dept: PEDIATRICS | Facility: CLINIC | Age: 71
End: 2021-10-27
Payer: COMMERCIAL

## 2021-10-27 ENCOUNTER — HOME INFUSION (PRE-WILLOW HOME INFUSION) (OUTPATIENT)
Dept: PHARMACY | Facility: CLINIC | Age: 71
End: 2021-10-27

## 2021-10-27 ENCOUNTER — ANCILLARY PROCEDURE (OUTPATIENT)
Dept: GENERAL RADIOLOGY | Facility: CLINIC | Age: 71
End: 2021-10-27
Attending: INTERNAL MEDICINE
Payer: COMMERCIAL

## 2021-10-27 VITALS
SYSTOLIC BLOOD PRESSURE: 157 MMHG | TEMPERATURE: 98.1 F | HEART RATE: 71 BPM | RESPIRATION RATE: 18 BRPM | OXYGEN SATURATION: 98 % | DIASTOLIC BLOOD PRESSURE: 83 MMHG

## 2021-10-27 DIAGNOSIS — Z93.1 GASTROSTOMY TUBE IN PLACE (H): ICD-10-CM

## 2021-10-27 DIAGNOSIS — D72.829 LEUKOCYTOSIS, UNSPECIFIED TYPE: ICD-10-CM

## 2021-10-27 DIAGNOSIS — Z79.01 LONG TERM CURRENT USE OF ANTICOAGULANT THERAPY: Primary | ICD-10-CM

## 2021-10-27 DIAGNOSIS — I48.91 ATRIAL FIBRILLATION (H): ICD-10-CM

## 2021-10-27 DIAGNOSIS — R62.7 FAILURE TO THRIVE IN ADULT: Primary | ICD-10-CM

## 2021-10-27 DIAGNOSIS — R11.0 NAUSEA: ICD-10-CM

## 2021-10-27 DIAGNOSIS — I48.0 PAROXYSMAL ATRIAL FIBRILLATION (H): ICD-10-CM

## 2021-10-27 DIAGNOSIS — N30.00 ACUTE CYSTITIS WITHOUT HEMATURIA: ICD-10-CM

## 2021-10-27 DIAGNOSIS — R91.8 PULMONARY INFILTRATES: ICD-10-CM

## 2021-10-27 DIAGNOSIS — D69.6 THROMBOCYTOPENIA (H): ICD-10-CM

## 2021-10-27 DIAGNOSIS — K92.0 HEMATEMESIS WITH NAUSEA: ICD-10-CM

## 2021-10-27 DIAGNOSIS — E86.0 DEHYDRATION: ICD-10-CM

## 2021-10-27 LAB
ALBUMIN SERPL-MCNC: 3.3 G/DL (ref 3.4–5)
ALBUMIN UR-MCNC: 10 MG/DL
ALP SERPL-CCNC: 104 U/L (ref 40–150)
ALT SERPL W P-5'-P-CCNC: 11 U/L (ref 0–70)
ANION GAP SERPL CALCULATED.3IONS-SCNC: 5 MMOL/L (ref 3–14)
APPEARANCE UR: CLEAR
AST SERPL W P-5'-P-CCNC: 20 U/L (ref 0–45)
BACTERIA #/AREA URNS HPF: ABNORMAL /HPF
BASOPHILS # BLD AUTO: 0.1 10E3/UL (ref 0–0.2)
BASOPHILS NFR BLD AUTO: 0 %
BILIRUB SERPL-MCNC: 0.8 MG/DL (ref 0.2–1.3)
BILIRUB UR QL STRIP: NEGATIVE
BUN SERPL-MCNC: 24 MG/DL (ref 7–30)
CALCIUM SERPL-MCNC: 9.9 MG/DL (ref 8.5–10.1)
CHLORIDE BLD-SCNC: 94 MMOL/L (ref 94–109)
CO2 SERPL-SCNC: 33 MMOL/L (ref 20–32)
COLOR UR AUTO: ABNORMAL
CREAT SERPL-MCNC: 1.02 MG/DL (ref 0.66–1.25)
EOSINOPHIL # BLD AUTO: 0.1 10E3/UL (ref 0–0.7)
EOSINOPHIL NFR BLD AUTO: 1 %
ERYTHROCYTE [DISTWIDTH] IN BLOOD BY AUTOMATED COUNT: 12.9 % (ref 10–15)
GFR SERPL CREATININE-BSD FRML MDRD: 73 ML/MIN/1.73M2
GLUCOSE BLD-MCNC: 170 MG/DL (ref 70–99)
GLUCOSE UR STRIP-MCNC: NEGATIVE MG/DL
HCT VFR BLD AUTO: 41.4 % (ref 40–53)
HGB BLD-MCNC: 15 G/DL (ref 13.3–17.7)
HGB UR QL STRIP: NEGATIVE
HOLD SPECIMEN: NORMAL
IMM GRANULOCYTES # BLD: 0.2 10E3/UL
IMM GRANULOCYTES NFR BLD: 1 %
INR PPP: 1.96 (ref 0.85–1.15)
KETONES UR STRIP-MCNC: NEGATIVE MG/DL
LEUKOCYTE ESTERASE UR QL STRIP: ABNORMAL
LYMPHOCYTES # BLD AUTO: 1.6 10E3/UL (ref 0.8–5.3)
LYMPHOCYTES NFR BLD AUTO: 8 %
MCH RBC QN AUTO: 31.6 PG (ref 26.5–33)
MCHC RBC AUTO-ENTMCNC: 36.2 G/DL (ref 31.5–36.5)
MCV RBC AUTO: 87 FL (ref 78–100)
MONOCYTES # BLD AUTO: 1.1 10E3/UL (ref 0–1.3)
MONOCYTES NFR BLD AUTO: 5 %
NEUTROPHILS # BLD AUTO: 18 10E3/UL (ref 1.6–8.3)
NEUTROPHILS NFR BLD AUTO: 85 %
NITRATE UR QL: NEGATIVE
PH UR STRIP: 6.5 [PH] (ref 5–7)
PLATELET # BLD AUTO: 128 10E3/UL (ref 150–450)
POTASSIUM BLD-SCNC: 3.5 MMOL/L (ref 3.4–5.3)
PROT SERPL-MCNC: 7.8 G/DL (ref 6.8–8.8)
RBC # BLD AUTO: 4.74 10E6/UL (ref 4.4–5.9)
RBC #/AREA URNS AUTO: ABNORMAL /HPF
SODIUM SERPL-SCNC: 132 MMOL/L (ref 133–144)
SP GR UR STRIP: 1 (ref 1–1.03)
UROBILINOGEN UR STRIP-MCNC: NORMAL MG/DL
WBC # BLD AUTO: 20.8 10E3/UL (ref 4–11)
WBC # BLD AUTO: ABNORMAL 10*3/UL
WBC #/AREA URNS AUTO: ABNORMAL /HPF

## 2021-10-27 PROCEDURE — 36415 COLL VENOUS BLD VENIPUNCTURE: CPT | Performed by: INTERNAL MEDICINE

## 2021-10-27 PROCEDURE — 81001 URINALYSIS AUTO W/SCOPE: CPT | Performed by: INTERNAL MEDICINE

## 2021-10-27 PROCEDURE — 87186 SC STD MICRODIL/AGAR DIL: CPT | Performed by: INTERNAL MEDICINE

## 2021-10-27 PROCEDURE — 99215 OFFICE O/P EST HI 40 MIN: CPT | Mod: 25 | Performed by: INTERNAL MEDICINE

## 2021-10-27 PROCEDURE — 87086 URINE CULTURE/COLONY COUNT: CPT | Performed by: INTERNAL MEDICINE

## 2021-10-27 PROCEDURE — 96374 THER/PROPH/DIAG INJ IV PUSH: CPT | Performed by: INTERNAL MEDICINE

## 2021-10-27 PROCEDURE — 71046 X-RAY EXAM CHEST 2 VIEWS: CPT | Mod: GC | Performed by: RADIOLOGY

## 2021-10-27 PROCEDURE — 85610 PROTHROMBIN TIME: CPT | Performed by: INTERNAL MEDICINE

## 2021-10-27 PROCEDURE — 87088 URINE BACTERIA CULTURE: CPT | Performed by: INTERNAL MEDICINE

## 2021-10-27 PROCEDURE — 96361 HYDRATE IV INFUSION ADD-ON: CPT | Performed by: INTERNAL MEDICINE

## 2021-10-27 PROCEDURE — 85025 COMPLETE CBC W/AUTO DIFF WBC: CPT | Performed by: INTERNAL MEDICINE

## 2021-10-27 PROCEDURE — 80053 COMPREHEN METABOLIC PANEL: CPT | Performed by: INTERNAL MEDICINE

## 2021-10-27 PROCEDURE — 96372 THER/PROPH/DIAG INJ SC/IM: CPT | Performed by: INTERNAL MEDICINE

## 2021-10-27 RX ORDER — CEFTRIAXONE SODIUM 1 G
1 VIAL (EA) INJECTION ONCE
Status: COMPLETED | OUTPATIENT
Start: 2021-10-27 | End: 2021-10-27

## 2021-10-27 RX ORDER — ONDANSETRON 4 MG/1
4 TABLET, ORALLY DISINTEGRATING ORAL EVERY 6 HOURS PRN
Qty: 30 TABLET | Refills: 0 | Status: SHIPPED | OUTPATIENT
Start: 2021-10-27 | End: 2022-01-05

## 2021-10-27 RX ORDER — ONDANSETRON 2 MG/ML
4 INJECTION INTRAMUSCULAR; INTRAVENOUS EVERY 6 HOURS PRN
Status: DISCONTINUED | OUTPATIENT
Start: 2021-10-27 | End: 2022-10-11

## 2021-10-27 RX ORDER — LEVOFLOXACIN 500 MG/1
500 TABLET, FILM COATED ORAL DAILY
Qty: 5 TABLET | Refills: 0 | Status: ON HOLD | OUTPATIENT
Start: 2021-10-27 | End: 2021-11-04

## 2021-10-27 RX ORDER — CEFTRIAXONE SODIUM 1 G/1
1 INJECTION, POWDER, FOR SOLUTION INTRAMUSCULAR; INTRAVENOUS EVERY 24 HOURS
Status: DISCONTINUED | OUTPATIENT
Start: 2021-10-27 | End: 2021-10-27

## 2021-10-27 RX ADMIN — Medication 1000 ML: at 15:30

## 2021-10-27 RX ADMIN — ONDANSETRON 4 MG: 2 INJECTION INTRAMUSCULAR; INTRAVENOUS at 14:26

## 2021-10-27 RX ADMIN — Medication 1 G: at 15:59

## 2021-10-27 RX ADMIN — Medication 1000 ML: at 13:01

## 2021-10-27 ASSESSMENT — PAIN SCALES - GENERAL: PAINLEVEL: NO PAIN (0)

## 2021-10-27 NOTE — PROGRESS NOTES
"ASSESSMENT:   1.  Failure to thrive, multifactorial reasons.     - inadequate oral intake, with dehydration.   Symptoms improved after 2 liters   - now with G-tube in place, feedings pending   - nausea/poor appetite  2.  Hematemesis, cause not clear but seems likely related to G tube placed 2 days ago.  Rule out gastritis, contributed to by procedure/stress in patient on warfarin.  Patient has used PPI in past, but not past several months.  Will resume.  Hope for resolution without further intervention.  3.  Marked leukocytosis (WBC 20K with left shift), without symptoms or exam findings suggesting an infection.    - CXR suggests bilateral hazy infiltrates which seems most likely due to aspiration, rule out community-acquired pneumonia.      - UA positive without genitourinary symptoms.  Await UC -->  positive klebsiella, sensitive to prescribed antibiotic.   Note that he has not genitourinary symptoms.  4.  Thrombocytopenia, no significant change, may well be contributing to bleeding causing hematemesis.  5.  Dysphagia, with likely aspiration    PLAN:  1.  Emperic IV ceftriaxone today, then start oral levaquin  2.  Omeprazole po once daily in am on empty stomach, if able  3.  Zofran as needed for nausea  4.  Follow-up with MD if a week, sooner if symptoms worsen  5.  Recheck INR every 2-3 days, as the antibiotics may increase INR.  Make adjustments or contact clinic if this happens.  6.  Start home nutritional feedings as per dietary instruction.  Consider emperic SSRI, which might help appetite ?      Arsh Hutson is a 71 year old who was directed to the [facility] clinic today by PCP for:   Failure to thrive  HPI Patient had tube placed Monday. Patient states that he has not had an appetite and has not been drinking much. \"The last time I ate good was Sunday (10/24).\" He also has been nauseated and vomited X4 last night with \"areas of black in the vomit.\"  Used chewable antacids, helped some.  Mild symptoms of " throat pain which he feels was from acid.  Patient showed me picture of emesis, which looks like a black liquid, no food.  Hasn't used pepto bismol.  Patient and wife report that emesis did look like coffee grounds. Last BM  5 days ago.  No abdominal pain except when G-tube pulls on abd entry site.    Denies fevers, chills and further complaints.   Mainly weak, no appetite.    Feeding G-tube was inserted 2 days ago.  I have spoken with Fernanda (BETH) who has pended orders for tube feeding, awaiting patient OK (he has copay costs).   Patient had feeding learning done at [facility] on 10/4 by her report.  Home infusion will be delivering tube feeding around 5pm today 10/27/2021.     Last felt good last week.   Helped son rake some.   Main problems with swallowing.               Review of Systems  Patient denies HEENT, cardiovascular, neuro, genitourinary symptoms        Past Medical History:   Diagnosis Date     Abnormal dopamine scan (DaTSCAN) 2020 11/04/2020    [phone] 11/3/2020   Narrative & Impression   Examination: Nuclear medicine DATscan for Dopamine Receptor Localization.   Examination: NM Brain Image Tomographic (SPECT) DATscan   Date: 11/3/2020 3:21 PM   Indication: Parkinsonism   Comparison: None   Additional Information: none   Interfering Medications: None   Technique:   The patient initially received 1 ml of Lugol's solution orally prior     Antiphospholipid antibody syndrome (H) 2005    Ravi's     Antiphospholipid antibody syndrome (H)     Ravi's, noted negative per testing re-done in 2008 in hematology note 01/13/2009     Articulation disorder 09/23/2020     Atrial fibrillation (H) 04/2011     Benign prostatic hyperplasia with urinary retention      Cirrhosis (H)      CKD (chronic kidney disease) stage 2, GFR 60-89 ml/min      Diabetes mellitus type II     steroid induced     DJD (degenerative joint disease) of knee     SHAWN, worse on right     DVT (deep venous thrombosis) (H)      ED (erectile dysfunction)       Gallbladder polyp 05/2010     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     Hemochromatosis      Hodgkin's disease NOS     5 cycles of ABVD in 2011     HTN (hypertension)      Hyperlipidaemia LDL goal <100      Multiple thyroid nodules     benign      Myeloproliferative disorder (H)     atypical, [facility]     Parkinson disease (H) 09/24/2020     PE (pulmonary embolism) 11/2004     Polyneuropathy      Primary pulmonary hypertension (H)      Thrombocytopenia (H) 06/08/2021       Current Outpatient Medications   Medication Sig Dispense Refill     acetaminophen (TYLENOL) 500 MG tablet 500mg tab as needed       dutasteride (AVODART) 0.5 MG capsule Take 1 capsule (0.5 mg) by mouth daily 90 capsule 3     flecainide (TAMBOCOR) 50 MG tablet TAKE 1 TABLET BY MOUTH TWICE A  tablet 0     fluticasone (FLONASE) 50 MCG/ACT nasal spray Spray 1 spray into both nostrils daily as needed for rhinitis 16 g 11     loratadine (CLARITIN) 10 MG tablet Take 10 mg by mouth daily as needed        metoprolol succinate ER (TOPROL XL) 25 MG 24 hr tablet Take 1 tablet (25 mg) by mouth daily In the evening 90 tablet 3     multivitamin (CENTRUM SILVER) tablet Take 1 tablet by mouth daily        PREVIDENT 5000 DRY MOUTH 1.1 % GEL topical gel Take by mouth daily  3     rosuvastatin (CRESTOR) 40 MG tablet Take 1 tablet (40 mg) by mouth At Bedtime 90 tablet 3     senna-docusate (SENOKOT-S/PERICOLACE) 8.6-50 MG tablet Take 2 tablets by mouth daily as needed for constipation       Starch-Maltodextrin (THICK-IT PO)        vitamin D3 (CHOLECALCIFEROL) 50 mcg (2000 units) tablet Take 1 tablet (50 mcg) by mouth daily       warfarin ANTICOAGULANT (COUMADIN) 2.5 MG tablet TAKE 3.75 MG EVERY FRIDAY AND 2.5 MG ALL OTHER DAYS OR AS DIRECTED BY [facility]. 90 tablet 3     amoxicillin (AMOXIL) 500 MG capsule 4 x 500mg tabs by mouth 1 hour before dental appointments.       calcium carbonate (TUMS) 500 MG chewable tablet Take 1 chew tab by mouth daily as needed for  heartburn       carbidopa-levodopa (SINEMET)  MG tablet Not taking 90 tablet 11     Pseudoeph-Doxylamine-DM-APAP (NYQUIL PO) As needed              Social History     Tobacco Use     Smoking status: Never Smoker     Smokeless tobacco: Never Used   Substance Use Topics     Alcohol use: No             Objective     /72 (BP Location: Right arm, Patient Position: Semi-Narvaez's, Cuff Size: Adult Regular)   Pulse 81   Temp 98.1  F (36.7  C) (Oral)   Resp 18   SpO2 98%     Physical Exam  GENERAL APPEARANCE: initially was soft spoken and with decreased attentiveness.   After fluids, mentation alert and intact  EYES: Eyes grossly normal to inspection  HENT: nose and mouth without ulcers or lesions  RESP: decreased bathroom but lungs seem clear to auscultation  CV: borderline tachycardia, irregularly irregular, with 1-2/6 systolic ejection type murmur heard best at the base   ABDOMEN: soft, nontender, no HSM or masses and bowel sounds normal.  G-tube in place.  SKIN: warm and dry  NEURO: no focal findings    Recent Labs   Lab Test 10/25/21  0851 10/20/21  0929 10/15/21  1107 10/14/21  1126 10/14/21  1125 08/21/21  0658 08/20/21  1453 08/20/21  0752 08/20/21  0752 05/28/21  0000 05/25/21  1509   HGB  --   --   --  14.9  --   --   --   --  13.7   < > 15.2   *  --   --  130*  --   --   --    < > 109*   < > 144*   INR 1.41* 2.5*   < >  --   --    < >  --   --  3.41*   < >  --    NA  --   --   --   --  138  --   --   --  136   < > 137   POTASSIUM  --   --   --   --  3.6  --   --   --  3.7   < > 4.3   CR  --   --   --   --  1.23  --   --   --  0.99   < > 1.25   A1C  --   --   --   --   --   --  5.5  --   --   --  5.4    < > = values in this interval not displayed.        Diagnostics:  Recent Results (from the past 24 hour(s))   Comprehensive metabolic panel    Collection Time: 10/27/21 12:57 PM   Result Value Ref Range    Sodium 132 (L) 133 - 144 mmol/L    Potassium 3.5 3.4 - 5.3 mmol/L    Chloride 94 94 -  109 mmol/L    Carbon Dioxide (CO2) 33 (H) 20 - 32 mmol/L    Anion Gap 5 3 - 14 mmol/L    Urea Nitrogen 24 7 - 30 mg/dL    Creatinine 1.02 0.66 - 1.25 mg/dL    Calcium 9.9 8.5 - 10.1 mg/dL    Glucose 170 (H) 70 - 99 mg/dL    Alkaline Phosphatase 104 40 - 150 U/L    AST 20 0 - 45 U/L    ALT 11 0 - 70 U/L    Protein Total 7.8 6.8 - 8.8 g/dL    Albumin 3.3 (L) 3.4 - 5.0 g/dL    Bilirubin Total 0.8 0.2 - 1.3 mg/dL    GFR Estimate 73 >60 mL/min/1.73m2   CBC with platelets    Collection Time: 10/27/21 12:58 PM   Result Value Ref Range    WBC Count 20.8 (H) 4.0 - 11.0 10e3/uL    RBC Count 4.74 4.40 - 5.90 10e6/uL    Hemoglobin 15.0 13.3 - 17.7 g/dL    Hematocrit 41.4 40.0 - 53.0 %    MCV 87 78 - 100 fL    MCH 31.6 26.5 - 33.0 pg    MCHC 36.2 31.5 - 36.5 g/dL    RDW 12.9 10.0 - 15.0 %    Platelet Count 128 (L) 150 - 450 10e3/uL   INR    Collection Time: 10/27/21 12:58 PM   Result Value Ref Range    INR 1.96 (H) 0.85 - 1.15   WBC and Differential    Collection Time: 10/27/21 12:58 PM   Result Value Ref Range    WBC Count      % Neutrophils 85 %    % Lymphocytes 8 %    % Monocytes 5 %    % Eosinophils 1 %    % Basophils 0 %    % Immature Granulocytes 1 %    Absolute Neutrophils 18.0 (H) 1.6 - 8.3 10e3/uL    Absolute Lymphocytes 1.6 0.8 - 5.3 10e3/uL    Absolute Monocytes 1.1 0.0 - 1.3 10e3/uL    Absolute Eosinophils 0.1 0.0 - 0.7 10e3/uL    Absolute Basophils 0.1 0.0 - 0.2 10e3/uL    Absolute Immature Granulocytes 0.2 (H) <=0.0 10e3/uL   UA with Microscopic reflex to Culture    Collection Time: 10/27/21  2:18 PM    Specimen: Urine, Midstream   Result Value Ref Range    Color Urine Light Yellow Colorless, Straw, Light Yellow, Yellow    Appearance Urine Clear Clear    Glucose Urine Negative Negative mg/dL    Bilirubin Urine Negative Negative    Ketones Urine Negative Negative mg/dL    Specific Gravity Urine 1.005 1.003 - 1.035    Blood Urine Negative Negative    pH Urine 6.5 5.0 - 7.0    Protein Albumin Urine 10  (A) Negative  mg/dL    Nitrite Urine Negative Negative    Leukocyte Esterase Urine Moderate (A) Negative    Urobilinogen Urine Normal Normal, 2.0 mg/dL   Urine Microscopic    Collection Time: 10/27/21  2:18 PM   Result Value Ref Range    Bacteria Urine Moderate (A) None Seen /HPF    RBC Urine 0-2 0-2 /HPF /HPF    WBC Urine 25-50 (A) 0-5 /HPF /HPF

## 2021-10-27 NOTE — PROGRESS NOTES
ANTICOAGULATION MANAGEMENT     Haresh Rock 71 year old male is on warfarin with subtherapeutic INR result. (Goal INR 2.0-3.0)    Recent labs: (last 7 days)     10/27/21  1258   INR 1.96*       ASSESSMENT     Source(s): Chart Review and Patient/Caregiver Call       Warfarin doses taken: Less warfarin taken than planned which may be affecting INR. Patient only took one tablet on 10/25/21 instead of instructed boost.     Diet: Patient had g-tube placed on 10/25/21 due to swallowing difficulties secondary to Parkinson's. Patient states he does not know what kind of formulation he will be using, but will see home care this afternoon and let us know.    New illness, injury, or hospitalization: Yes: failure to thrive, g-tube placement    Medication/supplement changes: None noted    Signs or symptoms of bleeding or clotting: No    Previous INR: Therapeutic last 2(+) visits    Additional findings: None     PLAN     Recommended plan for ongoing change(s) affecting INR     Dosing Instructions: Continue your current warfarin dose with next INR in 2 days       Summary  As of 10/27/2021    Full warfarin instructions:  3.75 mg every Fri; 2.5 mg all other days   Next INR check:  10/29/2021             Telephone call with Haresh who verbalizes understanding and agrees to plan    Patient to recheck with home meter    Education provided: Please call back if any changes to your diet, medications or how you've been taking warfarin    Plan made per [facility] anticoagulation protocol    Shanice Hyde RN  Anticoagulation Clinic  10/27/2021    _______________________________________________________________________     Anticoagulation Episode Summary     Current INR goal:  2.0-3.0   TTR:  59.3 % (1 y)   Target end date:  Indefinite   Send INR reminders to:  CHELSEA CHILEL    Indications    Long-term (current) use of anticoagulants [Z79.01] [Z79.01]  Atrial fibrillation (H) [I48.91]  Antiphospholipid antibody syndrome (H) (Resolved)  [D68.61]  Personal history of DVT (deep vein thrombosis) (Resolved) [Z86.718]  Atrial fibrillation  unspecified type (H) (Resolved) [I48.91]  Paroxysmal atrial fibrillation (H) [I48.0]           Comments:  JESSICA okay to manage by exception         Anticoagulation Care Providers     Provider Role Specialty Phone number    Anya Nwoak MD Referring Family Medicine [phone]

## 2021-10-27 NOTE — TELEPHONE ENCOUNTER
Transfer to Acute and Diagnostic Services  I have contacted the staff at the [facility] at [phone] ([place]) to confirm patient acceptance.  Special Needs: None    Discussed transition to Acute & Diagnostic Services Clinic, and patient agrees with next level of care.  Patient transportation will be provided by a family member.    Anya Nowak MD

## 2021-10-27 NOTE — TELEPHONE ENCOUNTER
Charito Gardner NP with Interventional Radiology at #[phone] called to alert Dr. Nowak that this patient has a new and functional Perc G-tube. Charito reports that patient does not meet with a Dietician until Monday 11/01/21.  Charito reports that patient has signs of dehydration and will need a plan for the interim.  Milvia Flores RN on 10/27/2021 at 9:57 AM

## 2021-10-27 NOTE — TELEPHONE ENCOUNTER
RN contacted patient and gave patient directions and phone number. Patient and patient's wife did request someone to meet him at [facility] with wheelchair.    RN placed call to [facility] at [phone] and spoke to Willa to let them know that patient will be arriving within the hour and would like someone to meet him at [facility] with wheelchair.  Milvia Flores RN on 10/27/2021 at 11:36 AM

## 2021-10-27 NOTE — TELEPHONE ENCOUNTER
IR follow-up note.    This is a 71 year old male with a PMH of HDL, GERD, DM II, A fib on coumadin, myeloproliferative disease, hodgkin's lymphoma s/p chemotherapy, pulmonary HTN, dysphagia, parkinson's disease and ongoing dysphagia and weight loss. Pt was referred to IR to place a G tube at the request of Dr. Nowak from [facility]. Pt saw pt learning center 10/4 but has yet to see the dietician (reportedly scheduled 11/1). G tube was successfully placed 10/24 without issue. Pt called 10/27 to report multiple post-procedural issues.    Pt states he has had minimal pain since the procedure. Does endorse shooting abdominal pains around tube with coughing. This is all improving since placement. States has had nausea since the procedure with multiple episodes of vomiting 10/26 and this AM. States vomit looked to have some old blood in it. No bright red blood. No bleeding from the tract externally. Is not using the tube because he has yet to meet with the dietician and has no supplies. Checking his BP routinely, which is higher than normal despite BP medications (154/89 at last check). Wife is concerned about worsening fatigue and weakness (this pre-dated G tube procedure) and now with vomiting a concern he is not getting enough oral intake and is dehydrated. He is otherwise feeling at this baseline. They are looking for recommendations.     It is reassuring that the patient's pain is minimal and improving. Some wendi-procedural bleeding is not unexpected especially in an anticoagulated pt. He is not endorsing any evidence of active bleeding at this time (bright red blood around the G tube at the skin, hematemesis, worsening abd pain, etc). N/V days after the procedure is unexpected yet likely multifactorial in this patient. Ok to prescribe an antinausea medication, but this is not likely to help with patient's underlying complaint. Needs more urgent assistance with obtaining tube supplies and recs for free water  and supplemental nutrition. Unclear that this can be done on an outpatient basis as quickly as the patient and spouse are requesting. Call out to Dr. Nowak's office. Spoke with RN to update regarding patient/spouse concerns. They will follow-up with the pt.    Charito De Leon, JOCELYN, APRN  Interventional Radiology

## 2021-10-27 NOTE — TELEPHONE ENCOUNTER
Please call patient with directions to [facility] - let [facility] know if they need someone to meet him at curb with wheelchair, etc.   Anya Nowak MD

## 2021-10-27 NOTE — PROGRESS NOTES
ANTICOAGULATION  MANAGEMENT     Interacting Medication Review    Interacting medication(s): Levofloxacin (Levaquin) with warfarin.    Duration: 5 days (10/27 to 11/1)    Indication: Pulmonary infiltrates    New medication?: Yes, interaction may increase INR and risk of bleeding       PLAN     Continue current warfarin dose. Recommend to check INR on 10/29 as planned with home care    Patient was not contacted    No adjustment to Anticoagulation Calendar was required    Blanca Rodriguez RN

## 2021-10-27 NOTE — PATIENT INSTRUCTIONS
PLAN:  1.  Take antibiotics until gone  2.  Omeprazole once daily in am (on empty stomach, if able)  3.  Zofran as needed for nausea  4.  Follow-up with MD if a week, sooner if symptoms worsen  5.  Recheck INR every 2-3 days, as the antibiotics may increase INR.  Make adjustments or contact clinic if this happens.  6.  Start home nutritional feedings as per dietary instruction.

## 2021-10-28 LAB — BACTERIA UR CULT: ABNORMAL

## 2021-10-29 ENCOUNTER — TELEPHONE (OUTPATIENT)
Dept: FAMILY MEDICINE | Facility: CLINIC | Age: 71
End: 2021-10-29

## 2021-10-29 ENCOUNTER — ANESTHESIA EVENT (OUTPATIENT)
Dept: SURGERY | Facility: CLINIC | Age: 71
DRG: 326 | End: 2021-10-29
Payer: COMMERCIAL

## 2021-10-29 ENCOUNTER — ANESTHESIA (OUTPATIENT)
Dept: SURGERY | Facility: CLINIC | Age: 71
DRG: 326 | End: 2021-10-29
Payer: COMMERCIAL

## 2021-10-29 ENCOUNTER — APPOINTMENT (OUTPATIENT)
Dept: CT IMAGING | Facility: CLINIC | Age: 71
DRG: 326 | End: 2021-10-29
Attending: EMERGENCY MEDICINE
Payer: COMMERCIAL

## 2021-10-29 ENCOUNTER — HOSPITAL ENCOUNTER (INPATIENT)
Facility: CLINIC | Age: 71
LOS: 6 days | Discharge: HOME HEALTH | DRG: 326 | End: 2021-11-04
Attending: EMERGENCY MEDICINE | Admitting: STUDENT IN AN ORGANIZED HEALTH CARE EDUCATION/TRAINING PROGRAM
Payer: COMMERCIAL

## 2021-10-29 DIAGNOSIS — Z86.711 PERSONAL HISTORY OF PE (PULMONARY EMBOLISM): ICD-10-CM

## 2021-10-29 DIAGNOSIS — R68.2 DRY MOUTH: ICD-10-CM

## 2021-10-29 DIAGNOSIS — G20.A1 PARKINSON'S DISEASE (H): ICD-10-CM

## 2021-10-29 DIAGNOSIS — I48.0 PAROXYSMAL ATRIAL FIBRILLATION (H): ICD-10-CM

## 2021-10-29 DIAGNOSIS — H04.123 DRY EYES: ICD-10-CM

## 2021-10-29 DIAGNOSIS — Z93.1 GASTROSTOMY TUBE IN PLACE (H): ICD-10-CM

## 2021-10-29 DIAGNOSIS — Z11.52 ENCOUNTER FOR SCREENING LABORATORY TESTING FOR SEVERE ACUTE RESPIRATORY SYNDROME CORONAVIRUS 2 (SARS-COV-2): ICD-10-CM

## 2021-10-29 DIAGNOSIS — R13.12 OROPHARYNGEAL DYSPHAGIA: ICD-10-CM

## 2021-10-29 DIAGNOSIS — K56.609 SMALL BOWEL OBSTRUCTION (H): ICD-10-CM

## 2021-10-29 DIAGNOSIS — D68.61 ANTIPHOSPHOLIPID ANTIBODY SYNDROME (H): ICD-10-CM

## 2021-10-29 LAB
ALBUMIN SERPL-MCNC: 2.7 G/DL (ref 3.4–5)
ALP SERPL-CCNC: 86 U/L (ref 40–150)
ALT SERPL W P-5'-P-CCNC: 10 U/L (ref 0–70)
ANION GAP SERPL CALCULATED.3IONS-SCNC: 5 MMOL/L (ref 3–14)
APTT PPP: 39 SECONDS (ref 22–38)
AST SERPL W P-5'-P-CCNC: 20 U/L (ref 0–45)
BASOPHILS # BLD AUTO: 0 10E3/UL (ref 0–0.2)
BASOPHILS NFR BLD AUTO: 0 %
BILIRUB SERPL-MCNC: 0.6 MG/DL (ref 0.2–1.3)
BUN SERPL-MCNC: 28 MG/DL (ref 7–30)
CALCIUM SERPL-MCNC: 9.2 MG/DL (ref 8.5–10.1)
CHLORIDE BLD-SCNC: 93 MMOL/L (ref 94–109)
CO2 SERPL-SCNC: 36 MMOL/L (ref 20–32)
CREAT SERPL-MCNC: 1.14 MG/DL (ref 0.66–1.25)
EOSINOPHIL # BLD AUTO: 0 10E3/UL (ref 0–0.7)
EOSINOPHIL NFR BLD AUTO: 0 %
ERYTHROCYTE [DISTWIDTH] IN BLOOD BY AUTOMATED COUNT: 13.1 % (ref 10–15)
ERYTHROCYTE [DISTWIDTH] IN BLOOD BY AUTOMATED COUNT: 13.2 % (ref 10–15)
GFR SERPL CREATININE-BSD FRML MDRD: 63 ML/MIN/1.73M2
GLUCOSE BLD-MCNC: 145 MG/DL (ref 70–99)
HCT VFR BLD AUTO: 42.1 % (ref 40–53)
HCT VFR BLD AUTO: 42.8 % (ref 40–53)
HGB BLD-MCNC: 14.8 G/DL (ref 13.3–17.7)
HGB BLD-MCNC: 15.1 G/DL (ref 13.3–17.7)
IMM GRANULOCYTES # BLD: 0.1 10E3/UL
IMM GRANULOCYTES NFR BLD: 0 %
INR PPP: 1.44 (ref 0.85–1.15)
INR PPP: 2.82 (ref 0.85–1.15)
LYMPHOCYTES # BLD AUTO: 1.7 10E3/UL (ref 0.8–5.3)
LYMPHOCYTES NFR BLD AUTO: 12 %
MCH RBC QN AUTO: 31.6 PG (ref 26.5–33)
MCH RBC QN AUTO: 31.9 PG (ref 26.5–33)
MCHC RBC AUTO-ENTMCNC: 35.2 G/DL (ref 31.5–36.5)
MCHC RBC AUTO-ENTMCNC: 35.3 G/DL (ref 31.5–36.5)
MCV RBC AUTO: 90 FL (ref 78–100)
MCV RBC AUTO: 91 FL (ref 78–100)
MONOCYTES # BLD AUTO: 0.9 10E3/UL (ref 0–1.3)
MONOCYTES NFR BLD AUTO: 7 %
NEUTROPHILS # BLD AUTO: 11.6 10E3/UL (ref 1.6–8.3)
NEUTROPHILS NFR BLD AUTO: 81 %
NRBC # BLD AUTO: 0 10E3/UL
NRBC BLD AUTO-RTO: 0 /100
PLATELET # BLD AUTO: 120 10E3/UL (ref 150–450)
PLATELET # BLD AUTO: 127 10E3/UL (ref 150–450)
POTASSIUM BLD-SCNC: 3.5 MMOL/L (ref 3.4–5.3)
PROT SERPL-MCNC: 7.4 G/DL (ref 6.8–8.8)
RADIOLOGIST FLAGS: ABNORMAL
RBC # BLD AUTO: 4.68 10E6/UL (ref 4.4–5.9)
RBC # BLD AUTO: 4.73 10E6/UL (ref 4.4–5.9)
SARS-COV-2 RNA RESP QL NAA+PROBE: NEGATIVE
SODIUM SERPL-SCNC: 134 MMOL/L (ref 133–144)
WBC # BLD AUTO: 14.3 10E3/UL (ref 4–11)
WBC # BLD AUTO: 15 10E3/UL (ref 4–11)

## 2021-10-29 PROCEDURE — 82247 BILIRUBIN TOTAL: CPT | Performed by: EMERGENCY MEDICINE

## 2021-10-29 PROCEDURE — 250N000011 HC RX IP 250 OP 636: Performed by: EMERGENCY MEDICINE

## 2021-10-29 PROCEDURE — 99285 EMERGENCY DEPT VISIT HI MDM: CPT | Mod: 25

## 2021-10-29 PROCEDURE — 85025 COMPLETE CBC W/AUTO DIFF WBC: CPT | Performed by: EMERGENCY MEDICINE

## 2021-10-29 PROCEDURE — 250N000009 HC RX 250: Performed by: HOSPITALIST

## 2021-10-29 PROCEDURE — 74176 CT ABD & PELVIS W/O CONTRAST: CPT | Mod: 26 | Performed by: RADIOLOGY

## 2021-10-29 PROCEDURE — 44202 LAP ENTERECTOMY: CPT | Performed by: SURGERY

## 2021-10-29 PROCEDURE — 96367 TX/PROPH/DG ADDL SEQ IV INF: CPT

## 2021-10-29 PROCEDURE — 99285 EMERGENCY DEPT VISIT HI MDM: CPT | Performed by: EMERGENCY MEDICINE

## 2021-10-29 PROCEDURE — 96365 THER/PROPH/DIAG IV INF INIT: CPT

## 2021-10-29 PROCEDURE — 120N000011 HC R&B TRANSPLANT UMMC

## 2021-10-29 PROCEDURE — 250N000015 HC RX FACTOR IP MED 636 OP 636: Performed by: STUDENT IN AN ORGANIZED HEALTH CARE EDUCATION/TRAINING PROGRAM

## 2021-10-29 PROCEDURE — 250N000011 HC RX IP 250 OP 636: Performed by: STUDENT IN AN ORGANIZED HEALTH CARE EDUCATION/TRAINING PROGRAM

## 2021-10-29 PROCEDURE — 82040 ASSAY OF SERUM ALBUMIN: CPT | Performed by: EMERGENCY MEDICINE

## 2021-10-29 PROCEDURE — 85027 COMPLETE CBC AUTOMATED: CPT

## 2021-10-29 PROCEDURE — 99221 1ST HOSP IP/OBS SF/LOW 40: CPT | Mod: 57 | Performed by: SURGERY

## 2021-10-29 PROCEDURE — 96375 TX/PRO/DX INJ NEW DRUG ADDON: CPT

## 2021-10-29 PROCEDURE — 74176 CT ABD & PELVIS W/O CONTRAST: CPT

## 2021-10-29 PROCEDURE — C9803 HOPD COVID-19 SPEC COLLECT: HCPCS

## 2021-10-29 PROCEDURE — 99223 1ST HOSP IP/OBS HIGH 75: CPT | Mod: AI | Performed by: STUDENT IN AN ORGANIZED HEALTH CARE EDUCATION/TRAINING PROGRAM

## 2021-10-29 PROCEDURE — 258N000003 HC RX IP 258 OP 636: Performed by: STUDENT IN AN ORGANIZED HEALTH CARE EDUCATION/TRAINING PROGRAM

## 2021-10-29 PROCEDURE — 96368 THER/DIAG CONCURRENT INF: CPT

## 2021-10-29 PROCEDURE — 36415 COLL VENOUS BLD VENIPUNCTURE: CPT

## 2021-10-29 PROCEDURE — 85730 THROMBOPLASTIN TIME PARTIAL: CPT | Performed by: EMERGENCY MEDICINE

## 2021-10-29 PROCEDURE — U0005 INFEC AGEN DETEC AMPLI PROBE: HCPCS | Performed by: EMERGENCY MEDICINE

## 2021-10-29 PROCEDURE — 85610 PROTHROMBIN TIME: CPT

## 2021-10-29 PROCEDURE — 250N000011 HC RX IP 250 OP 636

## 2021-10-29 PROCEDURE — 36415 COLL VENOUS BLD VENIPUNCTURE: CPT | Performed by: EMERGENCY MEDICINE

## 2021-10-29 PROCEDURE — 85610 PROTHROMBIN TIME: CPT | Performed by: EMERGENCY MEDICINE

## 2021-10-29 RX ORDER — SODIUM FLUORIDE 5 MG/G
GEL, DENTIFRICE DENTAL DAILY
Status: DISCONTINUED | OUTPATIENT
Start: 2021-10-29 | End: 2021-11-04 | Stop reason: HOSPADM

## 2021-10-29 RX ORDER — MULTIPLE VITAMINS W/ MINERALS TAB 9MG-400MCG
1 TAB ORAL DAILY
Status: DISCONTINUED | OUTPATIENT
Start: 2021-10-29 | End: 2021-11-01 | Stop reason: ALTCHOICE

## 2021-10-29 RX ORDER — ONDANSETRON 4 MG/1
4 TABLET, ORALLY DISINTEGRATING ORAL EVERY 6 HOURS PRN
Status: DISCONTINUED | OUTPATIENT
Start: 2021-10-29 | End: 2021-11-04 | Stop reason: HOSPADM

## 2021-10-29 RX ORDER — METOPROLOL SUCCINATE 25 MG/1
25 TABLET, EXTENDED RELEASE ORAL DAILY
Status: DISCONTINUED | OUTPATIENT
Start: 2021-10-29 | End: 2021-10-31

## 2021-10-29 RX ORDER — HEPARIN SODIUM 10000 [USP'U]/100ML
0-5000 INJECTION, SOLUTION INTRAVENOUS CONTINUOUS
Status: DISCONTINUED | OUTPATIENT
Start: 2021-10-29 | End: 2021-10-29

## 2021-10-29 RX ORDER — ROSUVASTATIN CALCIUM 10 MG/1
40 TABLET, COATED ORAL AT BEDTIME
Status: DISCONTINUED | OUTPATIENT
Start: 2021-10-29 | End: 2021-11-01

## 2021-10-29 RX ORDER — WARFARIN SODIUM 2.5 MG/1
2.5 TABLET ORAL
Status: DISCONTINUED | OUTPATIENT
Start: 2021-10-30 | End: 2021-10-29 | Stop reason: CLARIF

## 2021-10-29 RX ORDER — ACETAMINOPHEN 500 MG
500 TABLET ORAL EVERY 6 HOURS PRN
Status: DISCONTINUED | OUTPATIENT
Start: 2021-10-29 | End: 2021-10-30

## 2021-10-29 RX ORDER — METOPROLOL TARTRATE 1 MG/ML
2.5 INJECTION, SOLUTION INTRAVENOUS EVERY 6 HOURS
Status: DISCONTINUED | OUTPATIENT
Start: 2021-10-29 | End: 2021-10-30

## 2021-10-29 RX ORDER — LORATADINE 10 MG/1
10 TABLET ORAL DAILY PRN
Status: DISCONTINUED | OUTPATIENT
Start: 2021-10-29 | End: 2021-11-03

## 2021-10-29 RX ORDER — AMOXICILLIN 250 MG
2 CAPSULE ORAL DAILY PRN
Status: DISCONTINUED | OUTPATIENT
Start: 2021-10-29 | End: 2021-10-30

## 2021-10-29 RX ORDER — PIPERACILLIN SODIUM, TAZOBACTAM SODIUM 4; .5 G/20ML; G/20ML
4.5 INJECTION, POWDER, LYOPHILIZED, FOR SOLUTION INTRAVENOUS EVERY 6 HOURS
Status: DISCONTINUED | OUTPATIENT
Start: 2021-10-29 | End: 2021-10-31

## 2021-10-29 RX ORDER — IOPAMIDOL 755 MG/ML
92 INJECTION, SOLUTION INTRAVASCULAR ONCE
Status: COMPLETED | OUTPATIENT
Start: 2021-10-29 | End: 2021-10-29

## 2021-10-29 RX ORDER — FLUTICASONE PROPIONATE 50 MCG
1 SPRAY, SUSPENSION (ML) NASAL DAILY PRN
Status: DISCONTINUED | OUTPATIENT
Start: 2021-10-29 | End: 2021-11-04 | Stop reason: HOSPADM

## 2021-10-29 RX ORDER — DUTASTERIDE 0.5 MG/1
0.5 CAPSULE, LIQUID FILLED ORAL DAILY
Status: DISCONTINUED | OUTPATIENT
Start: 2021-10-29 | End: 2021-11-04 | Stop reason: HOSPADM

## 2021-10-29 RX ORDER — VITAMIN B COMPLEX
50 TABLET ORAL DAILY
Status: DISCONTINUED | OUTPATIENT
Start: 2021-10-29 | End: 2021-11-01 | Stop reason: ALTCHOICE

## 2021-10-29 RX ORDER — ONDANSETRON 2 MG/ML
4 INJECTION INTRAMUSCULAR; INTRAVENOUS EVERY 6 HOURS PRN
Status: DISCONTINUED | OUTPATIENT
Start: 2021-10-29 | End: 2021-11-04 | Stop reason: HOSPADM

## 2021-10-29 RX ORDER — FLECAINIDE ACETATE 50 MG/1
50 TABLET ORAL EVERY 12 HOURS SCHEDULED
Status: DISCONTINUED | OUTPATIENT
Start: 2021-10-29 | End: 2021-11-01

## 2021-10-29 RX ORDER — METOPROLOL TARTRATE 1 MG/ML
5 INJECTION, SOLUTION INTRAVENOUS EVERY 5 MIN PRN
Status: DISCONTINUED | OUTPATIENT
Start: 2021-10-29 | End: 2021-10-31

## 2021-10-29 RX ORDER — HEPARIN SODIUM 10000 [USP'U]/100ML
0-5000 INJECTION, SOLUTION INTRAVENOUS CONTINUOUS
Status: DISCONTINUED | OUTPATIENT
Start: 2021-10-29 | End: 2021-10-30

## 2021-10-29 RX ORDER — LIDOCAINE 40 MG/G
CREAM TOPICAL
Status: DISCONTINUED | OUTPATIENT
Start: 2021-10-29 | End: 2021-11-04 | Stop reason: HOSPADM

## 2021-10-29 RX ORDER — CALCIUM CARBONATE 500 MG/1
500 TABLET, CHEWABLE ORAL DAILY PRN
Status: DISCONTINUED | OUTPATIENT
Start: 2021-10-29 | End: 2021-11-04 | Stop reason: HOSPADM

## 2021-10-29 RX ADMIN — PROTHROMBIN, COAGULATION FACTOR VII HUMAN, COAGULATION FACTOR IX HUMAN, COAGULATION FACTOR X HUMAN, PROTEIN C, PROTEIN S HUMAN, AND WATER 1645 UNITS: KIT at 22:12

## 2021-10-29 RX ADMIN — PHYTONADIONE 10 MG: 10 INJECTION, EMULSION INTRAMUSCULAR; INTRAVENOUS; SUBCUTANEOUS at 21:40

## 2021-10-29 RX ADMIN — METOPROLOL TARTRATE 2.5 MG: 5 INJECTION INTRAVENOUS at 20:32

## 2021-10-29 RX ADMIN — HEPARIN SODIUM 800 UNITS/HR: 10000 INJECTION, SOLUTION INTRAVENOUS at 20:25

## 2021-10-29 RX ADMIN — IOPAMIDOL 92 ML: 755 INJECTION, SOLUTION INTRAVENOUS at 14:52

## 2021-10-29 RX ADMIN — PIPERACILLIN AND TAZOBACTAM 4.5 G: 4; .5 INJECTION, POWDER, FOR SOLUTION INTRAVENOUS at 16:56

## 2021-10-29 ASSESSMENT — ENCOUNTER SYMPTOMS
NAUSEA: 1
MYALGIAS: 0
FLANK PAIN: 0
SEIZURES: 0
NUMBNESS: 0
LIGHT-HEADEDNESS: 0
BLOOD IN STOOL: 0
EYE REDNESS: 0
FREQUENCY: 0
FEVER: 0
EYE DISCHARGE: 0
DIARRHEA: 0
AGITATION: 0
SHORTNESS OF BREATH: 0
CHILLS: 1
NERVOUS/ANXIOUS: 0
DYSURIA: 0
WEAKNESS: 1
HEADACHES: 0
VOMITING: 1
ABDOMINAL DISTENTION: 1
CONFUSION: 0
SORE THROAT: 0
CHEST TIGHTNESS: 0
COUGH: 1
ABDOMINAL PAIN: 0
PALPITATIONS: 0
NECK STIFFNESS: 0
BACK PAIN: 0
CONSTIPATION: 1

## 2021-10-29 ASSESSMENT — ACTIVITIES OF DAILY LIVING (ADL)
ADLS_ACUITY_SCORE: 10

## 2021-10-29 ASSESSMENT — MIFFLIN-ST. JEOR: SCORE: 1452.44

## 2021-10-29 NOTE — TELEPHONE ENCOUNTER
Told family I would check on message, called family, informed, agrees, family informs too weak, trouble standing, don't think they can get pt into car due to weakness, will call 911 to transport, will go to [facility], routed FYI to PCP care team  Shanice Alfaro RN, BSN  Message handled by CLINIC NURSE.

## 2021-10-29 NOTE — PROGRESS NOTES
This is a recent snapshot of the patient's [facility] medical record.  For current drug dose and complete information and questions, call [phone] or In Basket pool, [facility] home infusion ([id number])  CSN Number:  [id number]

## 2021-10-29 NOTE — TELEPHONE ENCOUNTER
Pt, son, wife and pt calls, wants to update care team, routed high priority to next available RN, informs:    Nurse Triage SBAR    Is this a 2nd Level Triage? YES, LICENSED PRACTITIONER REVIEW IS REQUIRED    Situation & Background: had feeding tube 10/25, saw provider at [facility] 10/27, started antibiotic, concern this am is pt c/o feeling bloated, nauseous, weak, tired, feels chills worse today, told had elevated WBC (20.8), started on Levaquin, has not taken Levaquin today due to nausea, has not taken any zofran today, told has pneumonia on 10/27/21    **SEE D DIMER ADDED ONTO 10/27 LABS, NOT COMPLETED?? CONFIRM IF NEEDED?    Assessment: f/u, failure to thrive, nausea, leukocytosis, dehydration, pulmonary infiltrates     Recommendation: confirm f/u plan, feels needs visit today, ? [facility] again     Routing to provider for recommendation on f/u plan, family has multiple questions and concerns, hard to sort out, wants to give update to care team, PCP OOO, routed to COVERING PROVIDER TEAM AND RN    PLEASE CALL PT OR WIFE JANICE WITH PLAN    Shanice Alfaro, RN, BSN  Message handled by CLINIC NURSE.

## 2021-10-29 NOTE — TELEPHONE ENCOUNTER
Patient needs to go to ED for evaluation and possible admission.  I think he needs IV antibiotics and fluids.    Berta Go, CNP

## 2021-10-29 NOTE — CONSULTS
[facility] Surgery Consultation    Haresh Rock MRN# [id number]   Age: 71 year old YOB: 1950     Date of Admission:  10/29/2021    Date of Consult:   10/29/21    Reason for consult: Abdominal pain              Assessment and Plan:   Assessment:    Mr. Rock is a 72yo man with Parkinson's disease with dysphagia and chronic constipation, afib (on coumadin), myeloproliferative disorder s/p BMT who presents with abdominal pain and distension after PEG placement by IR on 10/25/2021. On CT he was found to have distended stomach and small bowels suggestive of SBO with transition point at G tube which goes through small bowel. G tube now to gravity with lots of output. Nausea and pain improved.        Plan:   - Admit to medicine  - NPO, no tube feeds  - Keep G tube to gravity  - No emergent surgery needed. May require operative intervention this hospitalization, will discuss with staff  - Serial abdominal exams by surgery team  - Continue abx per primary team      Patient discussed with chief resident, Dr. Cole, who will discuss with staff, Dr. Finney.    Betzy Russo MD  PGY-1 General Surgery               Chief Complaint:   Abdominal pain         History of Present Illness:     Mr. Rock is a 72yo man with Parkinson's disease c/b dysphagia and constipation, afib (on coumadin), h/o PE/DVT, pulmonary HTN, myeloproliferative disorder s/p BMT c/b GVHD, hemochromatosis, cirrhosis, antiphospholipid antibody syndrome, T2DM,  CKD3, HTN, HLD who presents with 4 days of abdominal pain and distension after PEG placement by IR on 10/25/21. He had abdominal pain 10/26 evening after first tube feed. He continued increasing feeds on 10/27 and abdominal distension and bloating pain worsened so he came to ED on 10/29. Of note he went to urgent care on 10/27 and was found to have UTI along with possible aspiration pneumonia, given IV ceftriaxone and started po levaquin. Last BM was 1 week ago.  Nausea + vomiting over past week. No fevers. No chest pain or SOB.             Past Medical History:     Past Medical History:   Diagnosis Date     Abnormal dopamine scan (DaTSCAN) 2020 11/04/2020    [phone] 11/3/2020   Narrative & Impression   Examination: Nuclear medicine DATscan for Dopamine Receptor Localization.   Examination: NM Brain Image Tomographic (SPECT) DATscan   Date: 11/3/2020 3:21 PM   Indication: Parkinsonism   Comparison: None   Additional Information: none   Interfering Medications: None   Technique:   The patient initially received 1 ml of Lugol's solution orally prior     Antiphospholipid antibody syndrome (H) 2005    Ravi's     Antiphospholipid antibody syndrome (H)     Ravi's, noted negative per testing re-done in 2008 in hematology note 01/13/2009     Articulation disorder 09/23/2020     Atrial fibrillation (H) 04/2011     Benign prostatic hyperplasia with urinary retention      Cirrhosis (H)      CKD (chronic kidney disease) stage 2, GFR 60-89 ml/min      Diabetes mellitus type II     steroid induced     DJD (degenerative joint disease) of knee     SHAWN, worse on right     DVT (deep venous thrombosis) (H)      ED (erectile dysfunction)      Gallbladder polyp 05/2010     Ckxov-Eztert-Ctve Disease 2007    BMT     Hemochromatosis      Hodgkin's disease NOS     5 cycles of ABVD in 2011     HTN (hypertension)      Hyperlipidaemia LDL goal <100      Multiple thyroid nodules     benign      Myeloproliferative disorder (H)     atypical, [facility]     Parkinson disease (H) 09/24/2020     PE (pulmonary embolism) 11/2004     Polyneuropathy      Primary pulmonary hypertension (H)      Thrombocytopenia (H) 06/08/2021               Past Surgical History:     Past Surgical History:   Procedure Laterality Date     ANESTHESIA CARDIOVERSION  04/21/2011    Procedure:ANESTHESIA CARDIOVERSION; Surgeon:GENERIC ANESTHESIA PROVIDER; Location:[facility] OR     ARTHROSCOPY KNEE RT/LT      LT     CATARACT IOL, RT/LT  2017      COLONOSCOPY  10/16/2012    Procedure: COLONOSCOPY;  Colonoscopy, screening;  Surgeon: Reg Feliciano MD;  Location: [facility] OR     COLONOSCOPY N/A 04/14/2021    Procedure: COLONOSCOPY;  Surgeon: Reg Holm MD;  Location:  GI     ESOPHAGOSCOPY, GASTROSCOPY, DUODENOSCOPY (EGD), COMBINED N/A 10/07/2020    Procedure: ESOPHAGOGASTRODUODENOSCOPY, WITH BIOPSY;  Surgeon: Raul Sawyer DO;  Location: [facility] OR     H ABLATION FOCAL AFIB  03/05/2013    PVI     H ABLATION FOCAL AFIB  01/01/2018     HC BONE MARROW/STEM TRANSPLANT ALLOGENIC  05/08/2007     INSERT PORT VASCULAR ACCESS       JOINT REPLACEMTN, KNEE RT/LT  03/28/2012    SHAWN     PEG TUBE PLACEMENT  10/25/2021     VASECTOMY  1990               Social History:     Social History     Tobacco Use     Smoking status: Never Smoker     Smokeless tobacco: Never Used   Substance Use Topics     Alcohol use: No             Family History:     Family History   Problem Relation Age of Onset     Hypertension Mother      Cerebrovascular Disease Mother      Breast Cancer Mother      Arrhythmia Brother      Arrhythmia Sister         supraventricular tachycardia     Thyroid Disease Sister      Other - See Comments Son         lives in [place]. no kids and not .     Obsessive Compulsive Disorder Son      Depression Son      Eating Disorder Son         eats when depressed     Obesity Son      Thyroid Cancer Daughter         had thyroid removed     Bipolar Disorder Daughter      Other - See Comments Daughter         lives in [place] - single with one son 9  yrs     Other - See Comments Sister      Alzheimer Disease Father      Diabetes Paternal Aunt      Gastrointestinal Disease Maternal Grandmother         colitis      Parkinsonism Other         2 cousins with parkinson     C.A.D. No family hx of                Allergies:     Allergies   Allergen Reactions     Seasonal Allergies              Medications:     Current Facility-Administered Medications    Medication     ondansetron (ZOFRAN) injection 4 mg     pharmacy alert - intermittent dosing     piperacillin-tazobactam (ZOSYN) 4.5 g vial to attach to  mL bag     Current Outpatient Medications   Medication Sig     acetaminophen (TYLENOL) 500 MG tablet 500mg tab as needed     calcium carbonate (TUMS) 500 MG chewable tablet Take 1 chew tab by mouth daily as needed for heartburn     flecainide (TAMBOCOR) 50 MG tablet TAKE 1 TABLET BY MOUTH TWICE A DAY     fluticasone (FLONASE) 50 MCG/ACT nasal spray Spray 1 spray into both nostrils daily as needed for rhinitis     levofloxacin (LEVAQUIN) 500 MG tablet Take 1 tablet (500 mg) by mouth daily for 5 days     loratadine (CLARITIN) 10 MG tablet Take 10 mg by mouth daily as needed      metoprolol succinate ER (TOPROL XL) 25 MG 24 hr tablet Take 1 tablet (25 mg) by mouth daily In the evening     multivitamin (CENTRUM SILVER) tablet Take 1 tablet by mouth daily      omeprazole (PRILOSEC) 20 MG DR capsule Take 1 capsule (20 mg) by mouth every morning     ondansetron (ZOFRAN-ODT) 4 MG ODT tab Take 1 tablet (4 mg) by mouth every 6 hours as needed for nausea     Pseudoeph-Doxylamine-DM-APAP (NYQUIL PO) As needed     rosuvastatin (CRESTOR) 40 MG tablet Take 1 tablet (40 mg) by mouth At Bedtime     senna-docusate (SENOKOT-S/PERICOLACE) 8.6-50 MG tablet Take 2 tablets by mouth daily as needed for constipation     Starch-Maltodextrin (THICK-IT PO)      vitamin D3 (CHOLECALCIFEROL) 50 mcg (2000 units) tablet Take 1 tablet (50 mcg) by mouth daily     warfarin ANTICOAGULANT (COUMADIN) 2.5 MG tablet TAKE 3.75 MG EVERY FRIDAY AND 2.5 MG ALL OTHER DAYS OR AS DIRECTED BY ACC.     carbidopa-levodopa (SINEMET)  MG tablet Not taking (Patient not taking: Reported on 10/29/2021)     dutasteride (AVODART) 0.5 MG capsule Take 1 capsule (0.5 mg) by mouth daily     PREVIDENT 5000 DRY MOUTH 1.1 % GEL topical gel Take by mouth daily               Review of Systems:     See HPI above for  pertinent findings.          Physical Exam:   All vitals have been reviewed  Temp:  [97.5  F (36.4  C)] 97.5  F (36.4  C)  Pulse:  [69-76] 76  Resp:  [20] 20  BP: (167-194)/(91-99) 167/91  SpO2:  [95 %-98 %] 95 %    Intake/Output Summary (Last 24 hours) at 10/29/2021 1640  Last data filed at 10/29/2021 1627  Gross per 24 hour   Intake --   Output 1350 ml   Net -1350 ml       Physical Exam:   Gen: Laying in bed, no acute distress, looks comfortable  Pulm: Non-labored breathing on room air, no tachypnea  CV: RRR, no tachycardia, strong radial pulse  Abd: soft, non-distended, mild tenderness to palpation. G tube to gravity with >2L bilious output in bag.   Extremities: warm, well perfused, pedal pulses palpable          Data:   All laboratory data reviewed    Results:  BMP  Recent Labs   Lab 10/29/21  1259 10/27/21  1257    132*   POTASSIUM 3.5 3.5   CHLORIDE 93* 94   CO2 36* 33*   BUN 28 24   CR 1.14 1.02   * 170*     CBC  Recent Labs   Lab 10/29/21  1259 10/27/21  1258 10/25/21  0851   WBC 14.3* 20.8*  --    HGB 15.1 15.0  --    * 128* 132*     LFT  Recent Labs   Lab 10/29/21  1259 10/27/21  1258 10/27/21  1257 10/25/21  0851   AST 20  --  20  --    ALT 10  --  11  --    ALKPHOS 86  --  104  --    BILITOTAL 0.6  --  0.8  --    ALBUMIN 2.7*  --  3.3*  --    INR  --  1.96*  --  1.41*     Recent Labs   Lab 10/29/21  1259 10/27/21  1257   * 170*       Imaging:  Results for orders placed or performed during the hospital encounter of 10/29/21   CT Abdomen Pelvis w/o Contrast   Result Value Ref Range    Radiologist flags (Urgent)      Small bowel obstruction with suspected trans-enteric    Impression    IMPRESSION:   1. Small bowel obstruction at the G-tube site in the left upper  quadrant with significant gastric distention and suspected through and  through course of the G-tube through the small bowel. No definite  pneumoperitoneum, although there is trace free fluid in the abdomen.  No other  evidence of bowel complication.  2. Groundglass as well as tree-in-bud opacities in the bilateral lower  lobes and dependent right middle lobe, suspected aspiration versus  infection.  3. Mild intra and extra hepatic biliary ductal dilatation with  additional dilation of the main pancreatic duct, likely due to the  above obstruction.  4. Cholelithiasis without evidence of acute cholecystitis.  5. The remainder of the exam is not significantly changed since  2/26/2021.    [Urgent Result: Small bowel obstruction with suspected trans-enteric  course of the G tube through small bowel]    Finding was identified on 10/29/2021 3:11 PM.     Dr. Syed was contacted by Dr. Gaitan at 10/29/2021 3:26 PM and  verbalized understanding of the urgent finding.     I have personally reviewed the examination and initial interpretation  and I agree with the findings.    OLAMIDE AMEZQUITA MD         SYSTEM ID:  [id number]

## 2021-10-29 NOTE — H&P
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    #Abdominal Pain  #Small bowel obstruction  #Suspected trans-enteric course of the G tube through small bowel  Mr. Rock's presentation of abdominal pain, vomiting, and imaging is consistent with an SBO caused by his G-tube with a trans-enteric path. His relief of symptoms with turning his g-tube to a gravity bag further evidence of an obstructive etiology.   - Surgery following  - NPO, no tube feeds or meds  - Keep G-tube to gravity bag  - No emergent surgery needed, may require operative intervention this hospitalization  - Serial abdominal exams  - Zosyn 4.5 g Q6H IV    #Atrial Fibrillation  - Cardiology consulted for assistance with flecainide management while NPO  - Cardiac telemetry  - Hold PTA Coumadin, start Heparin drip  - Hold PTA Flecainide while NPO   - No IV equivalent per pharmacy   - IV Metoprolol 5 mg IV for AFib/RVR, rate > 130 every 5 minutes up to 15 mg    #Hx of DVT  #antiphospholipid antibody syndrome  INR on admission: 2.82  - Hold PTA Coumadin, start Heparin drip    # Chronic Thrombocytopenia  # History of 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  #Hx of GVHD  - Since 2012, 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  - Follows closely with BMT    Hx of Type II DM  - Last A1C 5.5  - Diet controlled    #History of  Parkinson's  #L foot drop  - Following with outpatient neurology   - Not on carbidopa-levodopa    # Hematuria  # Pyuria/Bacteruria  - s/p TURP   - Saw Urology 8/17  - Hold dutasteride while NPO       Diet:  NPO  DVT Prophylaxis: Warfarin, LI Heparin IV while NPO  Dill Catheter: Not present  Fluids: IV Heparin continuous  Central Lines: None  Code Status:  Full    Clinically Significant Risk Factors Present on Admission             # Coagulation Defect: home medication list includes an anticoagulant medication  # Thrombocytopenia: Plts = 120 10e3/uL (Ref range: 150 - 450 10e3/uL) on admission, will monitor for bleeding      Disposition Plan   Expected discharge:  2-5 days recommended to prior living arrangement once adequate pain management/ tolerating PO medications and safe discharge and surgical plan..     The patient's care was discussed with the Attending Physician, Dr. Morrissey.    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    Haresh's abdominal pain has been epigastric without radiation and has progressively worsened since Wednesday. He had a feeding tube placed  on 10/25 for weight loss in the setting of parkinson's associated dysphagia and failure to thrive. He was discharged home without any tube feed supplies and on Tuesday evening he ate a TV dinner and then had 4 episodes of vomiting. He was seen at urgent care on 10/27 and noted to have elevated WBC at 20.8 as well as positive UA with culture positive for klebsiella. CXR showed bilateral hazy infiltrates likely due to aspiration. Patient was given one dose of IV ceftriaxone and then started on oral levaquin.     On Thursday he received his tube feed supplies and had 3 tube feed meals on Thursday but his tube feed on Friday morning made his abdominal pain and nausea worse. He did not vomit but felt as if he had to.    In the emergency department his CT was consistent with an SBO, CBC showed downtrending leukocytosis (14.3 -->20.8) from his urgent care visit, and nearly immediate symptomatic relief when his g-tube was placed to gravity bag. He has been hypertensive at home but denies chest pain, palpitations, fevers, night sweats, dysuria, or shortness of breath.     Review of Systems    The 10 point Review of Systems is negative other than noted in the HPI or here.    Past Medical History    I have reviewed this patient's medical history and updated it with pertinent information if needed.   Past Medical History:   Diagnosis Date     Abnormal dopamine scan (DaTSCAN) 2020 11/04/2020    [phone] 11/3/2020   Narrative & Impression   Examination: Nuclear medicine DATscan for Dopamine Receptor Localization.   Examination: NM Brain Image Tomographic (SPECT) DATscan   Date: 11/3/2020 3:21 PM   Indication: Parkinsonism   Comparison: None   Additional Information: none   Interfering Medications: None   Technique:   The patient initially received 1 ml of Lugol's solution orally prior     Antiphospholipid antibody syndrome (H) 2005    Ravi's     Antiphospholipid antibody syndrome (H)     Ravi's, noted negative per testing  re-done in 2008 in hematology note 01/13/2009     Articulation disorder 09/23/2020     Atrial fibrillation (H) 04/2011     Benign prostatic hyperplasia with urinary retention      Cirrhosis (H)      CKD (chronic kidney disease) stage 2, GFR 60-89 ml/min      Diabetes mellitus type II     steroid induced     DJD (degenerative joint disease) of knee     SHAWN, worse on right     DVT (deep venous thrombosis) (H)      ED (erectile dysfunction)      Gallbladder polyp 05/2010     Xtfya-Tivqwd-Tigp Disease 2007    BMT     Hemochromatosis      Hodgkin's disease NOS     5 cycles of ABVD in 2011     HTN (hypertension)      Hyperlipidaemia LDL goal <100      Multiple thyroid nodules     benign      Myeloproliferative disorder (H)     atypical, [facility]     Parkinson disease (H) 09/24/2020     PE (pulmonary embolism) 11/2004     Polyneuropathy      Primary pulmonary hypertension (H)      Thrombocytopenia (H) 06/08/2021     Past Surgical History   I have reviewed this patient's surgical history and updated it with pertinent information if needed.  Past Surgical History:   Procedure Laterality Date     ANESTHESIA CARDIOVERSION  04/21/2011    Procedure:ANESTHESIA CARDIOVERSION; Surgeon:GENERIC ANESTHESIA PROVIDER; Location:[facility] OR     ARTHROSCOPY KNEE RT/LT      LT     CATARACT IOL, RT/LT  2017     COLONOSCOPY  10/16/2012    Procedure: COLONOSCOPY;  Colonoscopy, screening;  Surgeon: Reg Feliciano MD;  Location: [facility] OR     COLONOSCOPY N/A 04/14/2021    Procedure: COLONOSCOPY;  Surgeon: Reg Holm MD;  Location: [facility] GI     ESOPHAGOSCOPY, GASTROSCOPY, DUODENOSCOPY (EGD), COMBINED N/A 10/07/2020    Procedure: ESOPHAGOGASTRODUODENOSCOPY, WITH BIOPSY;  Surgeon: Raul Sawyer DO;  Location: [facility] OR     H ABLATION FOCAL AFIB  03/05/2013    PVI     H ABLATION FOCAL AFIB  01/01/2018     HC BONE MARROW/STEM TRANSPLANT ALLOGENIC  05/08/2007     INSERT PORT VASCULAR ACCESS       JOINT REPLACEMTN, KNEE RT/LT  03/28/2012    SHAWN     PEG  TUBE PLACEMENT  10/25/2021     VASECTOMY        Social History   I have reviewed this patient's social history and updated it with pertinent information if needed. Haresh PHAN Eveliachloé  reports that he has never smoked. He has never used smokeless tobacco. He reports that he does not drink alcohol and does not use drugs.    Family History   I have reviewed this patient's family history and updated it with pertinent information if needed.  Family History   Problem Relation Age of Onset     Hypertension Mother      Cerebrovascular Disease Mother      Breast Cancer Mother      Arrhythmia Brother      Arrhythmia Sister         supraventricular tachycardia     Thyroid Disease Sister      Other - See Comments Son         lives in [place]. no kids and not .     Obsessive Compulsive Disorder Son      Depression Son      Eating Disorder Son         eats when depressed     Obesity Son      Thyroid Cancer Daughter         had thyroid removed     Bipolar Disorder Daughter      Other - See Comments Daughter         lives in [place] - single with one son 9  yrs     Other - See Comments Sister      Alzheimer Disease Father      Diabetes Paternal Aunt      Gastrointestinal Disease Maternal Grandmother         colitis      Parkinsonism Other         2 cousins with parkinson     C.A.D. No family hx of        Prior to Admission Medications   Prior to Admission Medications   Prescriptions Last Dose Informant Patient Reported? Taking?   PREVIDENT 5000 DRY MOUTH 1.1 % GEL topical gel  Self Yes No   Sig: Take by mouth daily   Pseudoeph-Doxylamine-DM-APAP (NYQUIL PO)  at prn Self Yes Yes   Sig: As needed   Starch-Maltodextrin (THICK-IT PO) Unknown at Unknown time Self Yes Yes   acetaminophen (TYLENOL) 500 MG tablet  at prn Self Yes Yes   Simg tab as needed   calcium carbonate (TUMS) 500 MG chewable tablet  at prn Self Yes Yes   Sig: Take 1 chew tab by mouth daily as needed for heartburn   carbidopa-levodopa (SINEMET)   MG tablet Not Taking at Unknown time Self Yes No   Sig: Not taking   Patient not taking: Reported on 10/29/2021   dutasteride (AVODART) 0.5 MG capsule  Self No No   Sig: Take 1 capsule (0.5 mg) by mouth daily   flecainide (TAMBOCOR) 50 MG tablet 10/28/2021 at am Self No Yes   Sig: TAKE 1 TABLET BY MOUTH TWICE A DAY   fluticasone (FLONASE) 50 MCG/ACT nasal spray  at prn Self No Yes   Sig: Spray 1 spray into both nostrils daily as needed for rhinitis   levofloxacin (LEVAQUIN) 500 MG tablet 10/29/2021 at am Self No Yes   Sig: Take 1 tablet (500 mg) by mouth daily for 5 days   loratadine (CLARITIN) 10 MG tablet  at prn Self Yes Yes   Sig: Take 10 mg by mouth daily as needed    metoprolol succinate ER (TOPROL XL) 25 MG 24 hr tablet 10/28/2021 at pm Self No Yes   Sig: Take 1 tablet (25 mg) by mouth daily In the evening   multivitamin (CENTRUM SILVER) tablet 10/28/2021 at pm Self Yes Yes   Sig: Take 1 tablet by mouth daily    omeprazole (PRILOSEC) 20 MG DR capsule 10/29/2021 at am Self No Yes   Sig: Take 1 capsule (20 mg) by mouth every morning   ondansetron (ZOFRAN-ODT) 4 MG ODT tab  at prn Self No Yes   Sig: Take 1 tablet (4 mg) by mouth every 6 hours as needed for nausea   rosuvastatin (CRESTOR) 40 MG tablet 10/28/2021 at pm Self Yes Yes   Sig: Take 1 tablet (40 mg) by mouth At Bedtime   senna-docusate (SENOKOT-S/PERICOLACE) 8.6-50 MG tablet  at prn Self Yes Yes   Sig: Take 2 tablets by mouth daily as needed for constipation   vitamin D3 (CHOLECALCIFEROL) 50 mcg (2000 units) tablet 10/28/2021 at am Self Yes Yes   Sig: Take 1 tablet (50 mcg) by mouth daily   warfarin ANTICOAGULANT (COUMADIN) 2.5 MG tablet 10/28/2021 at pm Self No Yes   Sig: TAKE 3.75 MG EVERY FRIDAY AND 2.5 MG ALL OTHER DAYS OR AS DIRECTED BY [facility].      Facility-Administered Medications Last Administration Doses Remaining   ondansetron (ZOFRAN) injection 4 mg 10/27/2021  2:26 PM         Allergies   Allergies   Allergen Reactions     Seasonal  Allergies        Physical Exam   Vital Signs: Temp: 97.5  F (36.4  C) Temp src: Oral BP: (!) 167/91 Pulse: 76   Resp: 20 SpO2: 95 %      Weight: 151 lbs 0 oz    Constitutional: awake, tired appearing, cooperative, no apparent distress, and appears stated age  Eyes: Lids and lashes normal, pupils equal, extra ocular muscles intact, sclera clear, conjunctiva normal  ENT: Normocephalic, without obvious abnormality, atraumatic  Respiratory: No increased work of breathing, good air exchange, clear to auscultation bilaterally, no crackles or wheezing  Cardiovascular: regular rate and rhythm, normal S1 and S2, no S3 or S4, and no murmur noted  GI: soft, non-distended, non-tender, no masses palpated, no hepatosplenomegaly, G-tube surgical site c/d/i and without erythema  Skin: no bruising or bleeding, no redness, warmth, or swelling and no jaundice  Musculoskeletal: There is no redness, warmth, or swelling of the joints.   Neurologic: Awake, alert, oriented to name, place and time. No focal deficits.  Neuropsychiatric: General: normal, calm and normal eye contact. Level of consciousness: alert / normal. Affect: normal, Memory and insight: normal, memory for past and recent events intact and thought process normal    Data   Data reviewed today: I reviewed all medications, new labs and imaging results over the last 24 hours. I personally reviewed.    Recent Labs   Lab 10/29/21  1705 10/29/21  1259 10/27/21  1258 10/27/21  1257 10/25/21  0851   WBC  --  14.3* 20.8*  --   --    HGB  --  15.1 15.0  --   --    MCV  --  91 87  --   --    PLT  --  120* 128*  --  132*   INR 2.82*  --  1.96*  --  1.41*   NA  --  134  --  132*  --    POTASSIUM  --  3.5  --  3.5  --    CHLORIDE  --  93*  --  94  --    CO2  --  36*  --  33*  --    BUN  --  28  --  24  --    CR  --  1.14  --  1.02  --    ANIONGAP  --  5  --  5  --    GILBERT  --  9.2  --  9.9  --    GLC  --  145*  --  170*  --    ALBUMIN  --  2.7*  --  3.3*  --    PROTTOTAL  --  7.4  --  7.8   --    BILITOTAL  --  0.6  --  0.8  --    ALKPHOS  --  86  --  104  --    ALT  --  10  --  11  --    AST  --  20  --  20  --      Recent Results (from the past 24 hour(s))   CT Abdomen Pelvis w/o Contrast   Result Value    Radiologist flags (Urgent)     Small bowel obstruction with suspected trans-enteric    Narrative    EXAMINATION: CT ABDOMEN PELVIS W/O CONTRAST, 10/29/2021 3:08 PM    TECHNIQUE:  Helical CT images from the lung bases through the  symphysis pubis were obtained with contrast.  Coronal and sagittal  reformatted images were generated at a workstation for further  assessment.    CONTRAST:  92 ml Isovue 370.    COMPARISON: IR gastrostomy tube placement 10/25/2021, PET/CT 2/26/2021    HISTORY: Abdominal distension    FINDINGS:    Lines and tubes: Percutaneous gastrostomy tube with retention balloon  in the stomach. Additional details below.    Liver: Similar hypoplastic left hepatic lobe. No suspicious lesions.  Portal veins appear patent.  Gallbladder: Cholelithiasis without evidence of acute cholecystitis.  Mild intra and extrahepatic biliary ductal dilatation, likely related  to the bowel obstruction described below.  Spleen: Small splenule near the hilum, otherwise unremarkable.  Pancreas: Fatty atrophy with dilatation of the main pancreatic duct,  likely related to the bowel obstruction described below.  Adrenal glands: Nodular thickening without discrete nodules.  Kidneys: No hydronephrosis. No nephrolithiasis. No suspicious mass.  Benign cyst in the right midpole.  Bladder / Pelvic organs: Bladder is within normal limits. No pelvic  mass.  Bowel: Significantly dilated stomach and small bowel with air-fluid  levels and focal transition point in the left upper quadrant at the  G-tube site, with small bowel transposed between the anterior  abdominal wall and the anchored stomach, with suspected through and  through course of the G-tube through the small bowel (series 3, image  166; series 4, image  13). The remaining distal small bowel is  decompressed. No pneumatosis or portal venous gas. The appendix is  unremarkable. Colonic diverticulosis without evidence of acute  diverticulitis. Retained enteric contrast predominantly in the  obstructed small bowel loops from recent G-tube placement.  Lymph nodes: Similar prominent retroperitoneal lymph nodes. No new  suspicious suspicious lymphadenopathy.  Peritoneum / Retroperitoneum: No pneumoperitoneum. Trace free fluid in  the abdomen and pelvis. Scattered mesenteric haziness/stranding. Fat  containing inguinal hernias.  Abdominal vasculature: Major vascular structures of the abdomen are  patent and normal in caliber. Aortoiliac atherosclerosis.    Bones and soft tissues: Degenerative changes of the visualized spine.  No acute osseous abnormalities or suspicious bony lesions. Bilateral  gynecomastia.    Lower thorax: Groundglass as well as tree-in-bud opacities in the  bilateral lower lobes and dependent right middle lobe. No pleural  effusion. No cardiomegaly or significant pericardial effusion.  Coronary artery calcifications. Aortic valvular calcifications. Fluid  in the visualized distal esophagus with circumferential esophageal  wall thickening.      Impression    IMPRESSION:   1. Small bowel obstruction at the G-tube site in the left upper  quadrant with significant gastric distention and suspected through and  through course of the G-tube through the small bowel. No definite  pneumoperitoneum, although there is trace free fluid in the abdomen.  No other evidence of bowel complication.  2. Groundglass as well as tree-in-bud opacities in the bilateral lower  lobes and dependent right middle lobe, suspected aspiration versus  infection.  3. Mild intra and extra hepatic biliary ductal dilatation with  additional dilation of the main pancreatic duct, likely due to the  above obstruction.  4. Cholelithiasis without evidence of acute cholecystitis.  5. The remainder  of the exam is not significantly changed since  2/26/2021.    [Urgent Result: Small bowel obstruction with suspected trans-enteric  course of the G tube through small bowel]    Finding was identified on 10/29/2021 3:11 PM.     Dr. Syed was contacted by Dr. Gaitan at 10/29/2021 3:26 PM and  verbalized understanding of the urgent finding.     I have personally reviewed the examination and initial interpretation  and I agree with the findings.    OLAMIDE AMEZQUITA MD         SYSTEM ID:  [id number]

## 2021-10-29 NOTE — ED PROVIDER NOTES
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 Patient had a feeding tube placed on 10/25 and subsequently developed hematemesis. He was seen in the acute diagnostic center on 10/27 and noted to have elevated WBC at 20.8 as well as positive UA with culture positive for klebsiella. CXR showed bilateral hazy infiltrates likely due to aspiration. Patient was given one dose of IV ceftriaxone and then started on oral levaquin. Patient reports abdominal distention, nausea, generalized weakness, fatigue, and chills over the past several days which seems to be getting worse.    Patient reports on Tuesday he tried to eat a TV dinner.  That night and into the early morning he had 4 episodes of vomiting.  He started tube feeding yesterday and had 3 cans of formula.  His last bowel movement was about a week ago.  He reports decreased urination which he feels is related to decreased intake.    He has no known exposures.    Past Medical History  Past Medical History:   Diagnosis Date     Abnormal dopamine scan (DaTSCAN) 2020 11/04/2020    [phone] 11/3/2020   Narrative & Impression   Examination: Nuclear medicine DATscan for Dopamine Receptor Localization.   Examination: NM Brain Image Tomographic (SPECT) DATscan   Date: 11/3/2020 3:21 PM   Indication: Parkinsonism   Comparison: None   Additional Information: none   Interfering Medications: None   Technique:   The patient initially received 1 ml of Lugol's solution orally prior      Antiphospholipid antibody syndrome (H) 2005    Ravi's     Antiphospholipid antibody syndrome (H)     Ravi's, noted negative per testing re-done in 2008 in hematology note 01/13/2009     Articulation disorder 09/23/2020     Atrial fibrillation (H) 04/2011     Benign prostatic hyperplasia with urinary retention      Cirrhosis (H)      CKD (chronic kidney disease) stage 2, GFR 60-89 ml/min      Diabetes mellitus type II     steroid induced     DJD (degenerative joint disease) of knee     SHAWN, worse on right     DVT (deep venous thrombosis) (H)      ED (erectile dysfunction)      Gallbladder polyp 05/2010     Pmoqv-Hmphbh-Ugtg Disease 2007    BMT     Hemochromatosis      Hodgkin's disease NOS     5 cycles of ABVD in 2011     HTN (hypertension)      Hyperlipidaemia LDL goal <100      Multiple thyroid nodules     benign      Myeloproliferative disorder (H)     atypical, [facility]     Parkinson disease (H) 09/24/2020     PE (pulmonary embolism) 11/2004     Polyneuropathy      Primary pulmonary hypertension (H)      Thrombocytopenia (H) 06/08/2021     Past Surgical History:   Procedure Laterality Date     ANESTHESIA CARDIOVERSION  04/21/2011    Procedure:ANESTHESIA CARDIOVERSION; Surgeon:GENERIC ANESTHESIA PROVIDER; Location:[facility] OR     ARTHROSCOPY KNEE RT/LT      LT     CATARACT IOL, RT/LT  2017     COLONOSCOPY  10/16/2012    Procedure: COLONOSCOPY;  Colonoscopy, screening;  Surgeon: Reg Feliciano MD;  Location: [facility] OR     COLONOSCOPY N/A 04/14/2021    Procedure: COLONOSCOPY;  Surgeon: Reg Holm MD;  Location: [facility] GI     ESOPHAGOSCOPY, GASTROSCOPY, DUODENOSCOPY (EGD), COMBINED N/A 10/07/2020    Procedure: ESOPHAGOGASTRODUODENOSCOPY, WITH BIOPSY;  Surgeon: Raul Sawyer DO;  Location: [facility] OR     H ABLATION FOCAL AFIB  03/05/2013    PVI     H ABLATION FOCAL AFIB  01/01/2018     HC BONE MARROW/STEM TRANSPLANT ALLOGENIC  05/08/2007     INSERT PORT VASCULAR ACCESS       JOINT REPLACEMTN, KNEE RT/LT  03/28/2012     SHAWN     PEG TUBE PLACEMENT  10/25/2021     VASECTOMY  1990     acetaminophen (TYLENOL) 500 MG tablet  calcium carbonate (TUMS) 500 MG chewable tablet  flecainide (TAMBOCOR) 50 MG tablet  fluticasone (FLONASE) 50 MCG/ACT nasal spray  levofloxacin (LEVAQUIN) 500 MG tablet  loratadine (CLARITIN) 10 MG tablet  metoprolol succinate ER (TOPROL XL) 25 MG 24 hr tablet  multivitamin (CENTRUM SILVER) tablet  omeprazole (PRILOSEC) 20 MG DR capsule  ondansetron (ZOFRAN-ODT) 4 MG ODT tab  Pseudoeph-Doxylamine-DM-APAP (NYQUIL PO)  rosuvastatin (CRESTOR) 40 MG tablet  senna-docusate (SENOKOT-S/PERICOLACE) 8.6-50 MG tablet  Starch-Maltodextrin (THICK-IT PO)  vitamin D3 (CHOLECALCIFEROL) 50 mcg (2000 units) tablet  warfarin ANTICOAGULANT (COUMADIN) 2.5 MG tablet  carbidopa-levodopa (SINEMET)  MG tablet  dutasteride (AVODART) 0.5 MG capsule  PREVIDENT 5000 DRY MOUTH 1.1 % GEL topical gel      Allergies   Allergen Reactions     Seasonal Allergies      Family History  Family History   Problem Relation Age of Onset     Hypertension Mother      Cerebrovascular Disease Mother      Breast Cancer Mother      Arrhythmia Brother      Arrhythmia Sister         supraventricular tachycardia     Thyroid Disease Sister      Other - See Comments Son         lives in [place]. no kids and not .     Obsessive Compulsive Disorder Son      Depression Son      Eating Disorder Son         eats when depressed     Obesity Son      Thyroid Cancer Daughter         had thyroid removed     Bipolar Disorder Daughter      Other - See Comments Daughter         lives in [place] - single with one son 9  yrs     Other - See Comments Sister      Alzheimer Disease Father      Diabetes Paternal Aunt      Gastrointestinal Disease Maternal Grandmother         colitis      Parkinsonism Other         2 cousins with parkinson     C.A.D. No family hx of      Social History   Social History     Tobacco Use     Smoking status: Never Smoker     Smokeless  "tobacco: Never Used   Substance Use Topics     Alcohol use: No     Drug use: No      Past medical history, past surgical history, medications, allergies, family history, and social history were reviewed with the patient. No additional pertinent items.       Review of Systems   Constitutional: Positive for chills. Negative for fever.   HENT: Negative for sore throat.    Eyes: Negative for discharge and redness.   Respiratory: Positive for cough. Negative for chest tightness and shortness of breath.    Cardiovascular: Negative for chest pain, palpitations and leg swelling.   Gastrointestinal: Positive for abdominal distention, constipation, nausea and vomiting. Negative for abdominal pain, blood in stool and diarrhea.   Genitourinary: Negative for dysuria, flank pain and frequency.   Musculoskeletal: Negative for back pain, myalgias and neck stiffness.   Skin: Negative for pallor.   Neurological: Positive for weakness. Negative for seizures, light-headedness, numbness and headaches.   Psychiatric/Behavioral: Negative for agitation and confusion. The patient is not nervous/anxious.        Physical Exam   BP: (!) 194/94  Pulse: 69  Temp: 97.5  F (36.4  C)  Resp: 20  Height: 180.4 cm (5' 11.02\")  Weight: 68.5 kg (151 lb)  SpO2: 98 %  Physical Exam  Vitals and nursing note reviewed.   Constitutional:       General: He is not in acute distress.     Appearance: He is well-developed. He is not diaphoretic.   HENT:      Head: Normocephalic and atraumatic.   Eyes:      General: No scleral icterus.     Pupils: Pupils are equal, round, and reactive to light.   Cardiovascular:      Rate and Rhythm: Normal rate and regular rhythm.      Heart sounds: Normal heart sounds. No murmur heard.     Pulmonary:      Effort: No respiratory distress.      Breath sounds: No stridor. Examination of the right-lower field reveals rhonchi and rales. Rhonchi and rales present. No wheezing.   Abdominal:      General: Bowel sounds are normal. There " is distension (mild).      Palpations: Abdomen is soft.      Tenderness: There is generalized abdominal tenderness. There is no guarding or rebound.   Musculoskeletal:         General: No tenderness.      Cervical back: Normal range of motion and neck supple.   Skin:     General: Skin is warm and dry.      Coloration: Skin is not pale.      Findings: No erythema or rash.   Neurological:      Mental Status: He is alert and oriented to person, place, and time.      Sensory: No sensory deficit.      Coordination: Coordination normal.       ED Course      Procedures          Results for orders placed or performed during the hospital encounter of 10/29/21   CT Abdomen Pelvis w/o Contrast     Status: Abnormal   Result Value Ref Range    Radiologist flags (Urgent)      Small bowel obstruction with suspected trans-enteric    Narrative    EXAMINATION: CT ABDOMEN PELVIS W/O CONTRAST, 10/29/2021 3:08 PM    TECHNIQUE:  Helical CT images from the lung bases through the  symphysis pubis were obtained with contrast.  Coronal and sagittal  reformatted images were generated at a workstation for further  assessment.    CONTRAST:  92 ml Isovue 370.    COMPARISON: IR gastrostomy tube placement 10/25/2021, PET/CT 2/26/2021    HISTORY: Abdominal distension    FINDINGS:    Lines and tubes: Percutaneous gastrostomy tube with retention balloon  in the stomach. Additional details below.    Liver: Similar hypoplastic left hepatic lobe. No suspicious lesions.  Portal veins appear patent.  Gallbladder: Cholelithiasis without evidence of acute cholecystitis.  Mild intra and extrahepatic biliary ductal dilatation, likely related  to the bowel obstruction described below.  Spleen: Small splenule near the hilum, otherwise unremarkable.  Pancreas: Fatty atrophy with dilatation of the main pancreatic duct,  likely related to the bowel obstruction described below.  Adrenal glands: Nodular thickening without discrete nodules.  Kidneys: No  hydronephrosis. No nephrolithiasis. No suspicious mass.  Benign cyst in the right midpole.  Bladder / Pelvic organs: Bladder is within normal limits. No pelvic  mass.  Bowel: Significantly dilated stomach and small bowel with air-fluid  levels and focal transition point in the left upper quadrant at the  G-tube site, with small bowel transposed between the anterior  abdominal wall and the anchored stomach, with suspected through and  through course of the G-tube through the small bowel (series 3, image  166; series 4, image 13). The remaining distal small bowel is  decompressed. No pneumatosis or portal venous gas. The appendix is  unremarkable. Colonic diverticulosis without evidence of acute  diverticulitis. Retained enteric contrast predominantly in the  obstructed small bowel loops from recent G-tube placement.  Lymph nodes: Similar prominent retroperitoneal lymph nodes. No new  suspicious suspicious lymphadenopathy.  Peritoneum / Retroperitoneum: No pneumoperitoneum. Trace free fluid in  the abdomen and pelvis. Scattered mesenteric haziness/stranding. Fat  containing inguinal hernias.  Abdominal vasculature: Major vascular structures of the abdomen are  patent and normal in caliber. Aortoiliac atherosclerosis.    Bones and soft tissues: Degenerative changes of the visualized spine.  No acute osseous abnormalities or suspicious bony lesions. Bilateral  gynecomastia.    Lower thorax: Groundglass as well as tree-in-bud opacities in the  bilateral lower lobes and dependent right middle lobe. No pleural  effusion. No cardiomegaly or significant pericardial effusion.  Coronary artery calcifications. Aortic valvular calcifications. Fluid  in the visualized distal esophagus with circumferential esophageal  wall thickening.      Impression    IMPRESSION:   1. Small bowel obstruction at the G-tube site in the left upper  quadrant with significant gastric distention and suspected through and  through course of the G-tube  through the small bowel. No definite  pneumoperitoneum, although there is trace free fluid in the abdomen.  No other evidence of bowel complication.  2. Groundglass as well as tree-in-bud opacities in the bilateral lower  lobes and dependent right middle lobe, suspected aspiration versus  infection.  3. Mild intra and extra hepatic biliary ductal dilatation with  additional dilation of the main pancreatic duct, likely due to the  above obstruction.  4. Cholelithiasis without evidence of acute cholecystitis.  5. The remainder of the exam is not significantly changed since  2/26/2021.    [Urgent Result: Small bowel obstruction with suspected trans-enteric  course of the G tube through small bowel]    Finding was identified on 10/29/2021 3:11 PM.     Dr. Syed was contacted by Dr. Gaitan at 10/29/2021 3:26 PM and  verbalized understanding of the urgent finding.     I have personally reviewed the examination and initial interpretation  and I agree with the findings.    OLAMIDE AMEZQUITA MD         SYSTEM ID:  [id number]   Comprehensive metabolic panel     Status: Abnormal   Result Value Ref Range    Sodium 134 133 - 144 mmol/L    Potassium 3.5 3.4 - 5.3 mmol/L    Chloride 93 (L) 94 - 109 mmol/L    Carbon Dioxide (CO2) 36 (H) 20 - 32 mmol/L    Anion Gap 5 3 - 14 mmol/L    Urea Nitrogen 28 7 - 30 mg/dL    Creatinine 1.14 0.66 - 1.25 mg/dL    Calcium 9.2 8.5 - 10.1 mg/dL    Glucose 145 (H) 70 - 99 mg/dL    Alkaline Phosphatase 86 40 - 150 U/L    AST 20 0 - 45 U/L    ALT 10 0 - 70 U/L    Protein Total 7.4 6.8 - 8.8 g/dL    Albumin 2.7 (L) 3.4 - 5.0 g/dL    Bilirubin Total 0.6 0.2 - 1.3 mg/dL    GFR Estimate 63 >60 mL/min/1.73m2   CBC with platelets and differential     Status: Abnormal   Result Value Ref Range    WBC Count 14.3 (H) 4.0 - 11.0 10e3/uL    RBC Count 4.73 4.40 - 5.90 10e6/uL    Hemoglobin 15.1 13.3 - 17.7 g/dL    Hematocrit 42.8 40.0 - 53.0 %    MCV 91 78 - 100 fL    MCH 31.9 26.5 - 33.0 pg    MCHC 35.3 31.5  - 36.5 g/dL    RDW 13.1 10.0 - 15.0 %    Platelet Count 120 (L) 150 - 450 10e3/uL    % Neutrophils 81 %    % Lymphocytes 12 %    % Monocytes 7 %    % Eosinophils 0 %    % Basophils 0 %    % Immature Granulocytes 0 %    NRBCs per 100 WBC 0 <1 /100    Absolute Neutrophils 11.6 (H) 1.6 - 8.3 10e3/uL    Absolute Lymphocytes 1.7 0.8 - 5.3 10e3/uL    Absolute Monocytes 0.9 0.0 - 1.3 10e3/uL    Absolute Eosinophils 0.0 0.0 - 0.7 10e3/uL    Absolute Basophils 0.0 0.0 - 0.2 10e3/uL    Absolute Immature Granulocytes 0.1 (H) <=0.0 10e3/uL    Absolute NRBCs 0.0 10e3/uL   Asymptomatic COVID-19 Virus (Coronavirus) by PCR Nasopharyngeal     Status: Normal    Specimen: Nasopharyngeal; Swab   Result Value Ref Range    SARS CoV2 PCR Negative Negative    Narrative    Testing was performed using the Xpert Xpress SARS-CoV-2 Assay on the  [employer]. Additional information about  this Emergency Use Authorization (EUA) assay can be found via the Lab  Guide. This test should be ordered for the detection of SARS-CoV-2 in  individuals who meet SARS-CoV-2 clinical and/or epidemiological  criteria. Test performance is unknown in asymptomatic patients. This  test is for in vitro diagnostic use under the FDA EUA for  laboratories certified under CLIA to perform high complexity testing.  This test has not been FDA cleared or approved. A negative result  does not rule out the presence of PCR inhibitors in the specimen or  target RNA in concentration below the limit of detection for the  assay. The possibility of a false negative should be considered if  the patient's recent exposure or clinical presentation suggests  COVID-19. This test was validated by the [facility]. This laboratory is certified under  the Clinical Laboratory Improvement Amendments of 1988 (CLIA-88) as  qualified to perform high complexity laboratory testing.       Medications   piperacillin-tazobactam  (ZOSYN) 4.5 g vial to attach to  mL bag (4.5 g Intravenous New Bag 10/29/21 1698)   pharmacy alert - intermittent dosing (has no administration in time range)   iopamidol (ISOVUE-370) solution 92 mL (92 mLs Intravenous Given 10/29/21 1452)   sodium chloride (PF) 0.9% PF flush 73 mL (73 mLs Intravenous Given 10/29/21 1452)        Assessments & Plan (with Medical Decision Making)   Patient is a 71-year-old male with a history of Parkinson's disease resulting in some difficulty swallowing.  He has been losing weight, and therefore a feeding tube was placed on 10/25.  Patient tried eating on Tuesday night and developed nausea and vomiting throughout the next day.  He then finally got formula and tried feeding himself through his feeding tube.  He has noted increased abdominal distention and discomfort since that time.    Patient was at the acute diagnostic center 2 days ago for evaluation of nausea, weakness, and fatigue.  At that time he had a chest x-ray performed which showed bilateral hazy infiltrates.  He was given a dose of IV antibiotics, and then discharged on Levaquin.    On arrival here, patient blood pressure was 194/94 with a pulse rate of 69 and temperature 97.5.  Respirations were 20 with O2 saturations 98%.  His abdomen is mildly distended and tender, however he does not have an acute abdomen.    An IV was established and bloods were drawn.  A CT scan of the abdomen will also be obtained to evaluate for obstruction, abscess, or hemorrhage.    Patient comprehensive metabolic profile showed a chloride of 93, bicarbonate 36, glucose of 145, and albumin of 2.7.  His white count was 14.3 with a hemoglobin of 15.1.    CT of the abdomen showed a distal small bowel obstruction with suspected transenteric course of the G-tube through the small bowel.  There is also cholelithiasis without evidence of acute cholecystitis.  Chest x-ray showed groundglass as well as tree-in-bud opacities in the bilateral lower  lobes and dependent right middle lobe.  There is suspicion of aspiration versus infection.  Patient was started on Zosyn which should cover both his lungs and an intra-abdominal infection.    I spoke to general surgery who recommended placing his feeding tube to gravity to hopefully help decompress the stomach.  Interventional radiology was also aware of this patient as they were the ones that placed the tube.    Patient continued to have normal vital signs and actually improvement of his blood pressure.  He continued to be afebrile.  Patient will be admitted to the medicine service with general surgery consultation.    I have reviewed the nursing notes. I have reviewed the findings, diagnosis, plan and need for follow up with the patient.    New Prescriptions    No medications on file       Final diagnoses:   Small bowel obstruction (H)       --  Tyler Syed  [facility] EMERGENCY DEPARTMENT  10/29/2021     Tyler Syed MD  10/29/21 [id number]

## 2021-10-29 NOTE — ED NOTES
Bed: ED27  Expected date: 10/29/21  Expected time: 11:55 AM  Means of arrival:   Comments:  Kizzy 637 71M/Abd Bloat/feeding tube issues

## 2021-10-30 LAB
ALBUMIN SERPL-MCNC: 2.3 G/DL (ref 3.4–5)
ALP SERPL-CCNC: 69 U/L (ref 40–150)
ALT SERPL W P-5'-P-CCNC: 9 U/L (ref 0–70)
ANION GAP SERPL CALCULATED.3IONS-SCNC: 11 MMOL/L (ref 3–14)
AST SERPL W P-5'-P-CCNC: 16 U/L (ref 0–45)
BILIRUB SERPL-MCNC: 0.7 MG/DL (ref 0.2–1.3)
BUN SERPL-MCNC: 30 MG/DL (ref 7–30)
CALCIUM SERPL-MCNC: 8.6 MG/DL (ref 8.5–10.1)
CHLORIDE BLD-SCNC: 98 MMOL/L (ref 94–109)
CO2 SERPL-SCNC: 27 MMOL/L (ref 20–32)
CREAT SERPL-MCNC: 1.21 MG/DL (ref 0.66–1.25)
CRP SERPL-MCNC: 32 MG/L (ref 0–8)
ERYTHROCYTE [DISTWIDTH] IN BLOOD BY AUTOMATED COUNT: 13.2 % (ref 10–15)
GFR SERPL CREATININE-BSD FRML MDRD: 59 ML/MIN/1.73M2
GLUCOSE BLD-MCNC: 140 MG/DL (ref 70–99)
GLUCOSE BLDC GLUCOMTR-MCNC: 132 MG/DL (ref 70–99)
GLUCOSE BLDC GLUCOMTR-MCNC: 150 MG/DL (ref 70–99)
HCT VFR BLD AUTO: 41.2 % (ref 40–53)
HGB BLD-MCNC: 14.5 G/DL (ref 13.3–17.7)
HOLD SPECIMEN: NORMAL
HOLD SPECIMEN: NORMAL
INR PPP: 1.32 (ref 0.85–1.15)
INR PPP: 1.32 (ref 0.85–1.15)
MAGNESIUM SERPL-MCNC: 1.4 MG/DL (ref 1.6–2.3)
MCH RBC QN AUTO: 32.4 PG (ref 26.5–33)
MCHC RBC AUTO-ENTMCNC: 35.2 G/DL (ref 31.5–36.5)
MCV RBC AUTO: 92 FL (ref 78–100)
PLATELET # BLD AUTO: 95 10E3/UL (ref 150–450)
POTASSIUM BLD-SCNC: 3.1 MMOL/L (ref 3.4–5.3)
POTASSIUM BLD-SCNC: 3.9 MMOL/L (ref 3.4–5.3)
PROT SERPL-MCNC: 6.3 G/DL (ref 6.8–8.8)
RBC # BLD AUTO: 4.47 10E6/UL (ref 4.4–5.9)
SODIUM SERPL-SCNC: 136 MMOL/L (ref 133–144)
WBC # BLD AUTO: 16.3 10E3/UL (ref 4–11)

## 2021-10-30 PROCEDURE — 250N000011 HC RX IP 250 OP 636

## 2021-10-30 PROCEDURE — 84132 ASSAY OF SERUM POTASSIUM: CPT | Performed by: STUDENT IN AN ORGANIZED HEALTH CARE EDUCATION/TRAINING PROGRAM

## 2021-10-30 PROCEDURE — 120N000011 HC R&B TRANSPLANT UMMC

## 2021-10-30 PROCEDURE — 85610 PROTHROMBIN TIME: CPT

## 2021-10-30 PROCEDURE — 250N000013 HC RX MED GY IP 250 OP 250 PS 637

## 2021-10-30 PROCEDURE — 250N000011 HC RX IP 250 OP 636: Performed by: STUDENT IN AN ORGANIZED HEALTH CARE EDUCATION/TRAINING PROGRAM

## 2021-10-30 PROCEDURE — 272N000001 HC OR GENERAL SUPPLY STERILE: Performed by: SURGERY

## 2021-10-30 PROCEDURE — 99233 SBSQ HOSP IP/OBS HIGH 50: CPT | Mod: GC | Performed by: HOSPITALIST

## 2021-10-30 PROCEDURE — 250N000009 HC RX 250: Performed by: SURGERY

## 2021-10-30 PROCEDURE — 36415 COLL VENOUS BLD VENIPUNCTURE: CPT

## 2021-10-30 PROCEDURE — 250N000009 HC RX 250: Performed by: STUDENT IN AN ORGANIZED HEALTH CARE EDUCATION/TRAINING PROGRAM

## 2021-10-30 PROCEDURE — 370N000017 HC ANESTHESIA TECHNICAL FEE, PER MIN: Performed by: SURGERY

## 2021-10-30 PROCEDURE — 258N000003 HC RX IP 258 OP 636: Performed by: STUDENT IN AN ORGANIZED HEALTH CARE EDUCATION/TRAINING PROGRAM

## 2021-10-30 PROCEDURE — 99232 SBSQ HOSP IP/OBS MODERATE 35: CPT | Mod: GC | Performed by: STUDENT IN AN ORGANIZED HEALTH CARE EDUCATION/TRAINING PROGRAM

## 2021-10-30 PROCEDURE — 250N000013 HC RX MED GY IP 250 OP 250 PS 637: Performed by: STUDENT IN AN ORGANIZED HEALTH CARE EDUCATION/TRAINING PROGRAM

## 2021-10-30 PROCEDURE — 250N000025 HC SEVOFLURANE, PER MIN: Performed by: SURGERY

## 2021-10-30 PROCEDURE — 85027 COMPLETE CBC AUTOMATED: CPT

## 2021-10-30 PROCEDURE — 36415 COLL VENOUS BLD VENIPUNCTURE: CPT | Performed by: STUDENT IN AN ORGANIZED HEALTH CARE EDUCATION/TRAINING PROGRAM

## 2021-10-30 PROCEDURE — 0DB80ZZ EXCISION OF SMALL INTESTINE, OPEN APPROACH: ICD-10-PCS | Performed by: SURGERY

## 2021-10-30 PROCEDURE — 0DW64UZ REVISION OF FEEDING DEVICE IN STOMACH, PERCUTANEOUS ENDOSCOPIC APPROACH: ICD-10-PCS | Performed by: SURGERY

## 2021-10-30 PROCEDURE — C9113 INJ PANTOPRAZOLE SODIUM, VIA: HCPCS

## 2021-10-30 PROCEDURE — 999N000141 HC STATISTIC PRE-PROCEDURE NURSING ASSESSMENT: Performed by: SURGERY

## 2021-10-30 PROCEDURE — 86140 C-REACTIVE PROTEIN: CPT

## 2021-10-30 PROCEDURE — 88307 TISSUE EXAM BY PATHOLOGIST: CPT | Mod: TC | Performed by: SURGERY

## 2021-10-30 PROCEDURE — 0WJG4ZZ INSPECTION OF PERITONEAL CAVITY, PERCUTANEOUS ENDOSCOPIC APPROACH: ICD-10-PCS | Performed by: SURGERY

## 2021-10-30 PROCEDURE — 83735 ASSAY OF MAGNESIUM: CPT | Performed by: STUDENT IN AN ORGANIZED HEALTH CARE EDUCATION/TRAINING PROGRAM

## 2021-10-30 PROCEDURE — 710N000010 HC RECOVERY PHASE 1, LEVEL 2, PER MIN: Performed by: SURGERY

## 2021-10-30 PROCEDURE — 80053 COMPREHEN METABOLIC PANEL: CPT

## 2021-10-30 PROCEDURE — 250N000009 HC RX 250

## 2021-10-30 PROCEDURE — 360N000076 HC SURGERY LEVEL 3, PER MIN: Performed by: SURGERY

## 2021-10-30 RX ORDER — PANTOPRAZOLE SODIUM 40 MG/1
40 TABLET, DELAYED RELEASE ORAL
Status: DISCONTINUED | OUTPATIENT
Start: 2021-10-31 | End: 2021-10-31

## 2021-10-30 RX ORDER — POTASSIUM CHLORIDE 1.5 G/1.58G
60 POWDER, FOR SOLUTION ORAL ONCE
Status: DISCONTINUED | OUTPATIENT
Start: 2021-10-30 | End: 2021-10-30

## 2021-10-30 RX ORDER — LIDOCAINE HYDROCHLORIDE 20 MG/ML
INJECTION, SOLUTION INFILTRATION; PERINEURAL PRN
Status: DISCONTINUED | OUTPATIENT
Start: 2021-10-30 | End: 2021-10-30

## 2021-10-30 RX ORDER — ONDANSETRON 2 MG/ML
INJECTION INTRAMUSCULAR; INTRAVENOUS PRN
Status: DISCONTINUED | OUTPATIENT
Start: 2021-10-30 | End: 2021-10-30

## 2021-10-30 RX ORDER — ONDANSETRON 2 MG/ML
4 INJECTION INTRAMUSCULAR; INTRAVENOUS EVERY 30 MIN PRN
Status: DISCONTINUED | OUTPATIENT
Start: 2021-10-30 | End: 2021-10-30 | Stop reason: HOSPADM

## 2021-10-30 RX ORDER — HYDRALAZINE HYDROCHLORIDE 20 MG/ML
2.5-5 INJECTION INTRAMUSCULAR; INTRAVENOUS EVERY 10 MIN PRN
Status: DISCONTINUED | OUTPATIENT
Start: 2021-10-30 | End: 2021-10-30 | Stop reason: HOSPADM

## 2021-10-30 RX ORDER — WARFARIN SODIUM 5 MG/1
5 TABLET ORAL ONCE
Status: COMPLETED | OUTPATIENT
Start: 2021-10-31 | End: 2021-10-31

## 2021-10-30 RX ORDER — PROPOFOL 10 MG/ML
INJECTION, EMULSION INTRAVENOUS PRN
Status: DISCONTINUED | OUTPATIENT
Start: 2021-10-30 | End: 2021-10-30

## 2021-10-30 RX ORDER — NALOXONE HYDROCHLORIDE 0.4 MG/ML
0.4 INJECTION, SOLUTION INTRAMUSCULAR; INTRAVENOUS; SUBCUTANEOUS
Status: DISCONTINUED | OUTPATIENT
Start: 2021-10-30 | End: 2021-11-04 | Stop reason: HOSPADM

## 2021-10-30 RX ORDER — ONDANSETRON 4 MG/1
4 TABLET, ORALLY DISINTEGRATING ORAL EVERY 30 MIN PRN
Status: DISCONTINUED | OUTPATIENT
Start: 2021-10-30 | End: 2021-10-30 | Stop reason: HOSPADM

## 2021-10-30 RX ORDER — HEPARIN SODIUM 10000 [USP'U]/100ML
0-5000 INJECTION, SOLUTION INTRAVENOUS CONTINUOUS
Status: DISCONTINUED | OUTPATIENT
Start: 2021-10-31 | End: 2021-11-03

## 2021-10-30 RX ORDER — OXYCODONE HYDROCHLORIDE 5 MG/1
5 TABLET ORAL EVERY 4 HOURS PRN
Status: DISCONTINUED | OUTPATIENT
Start: 2021-10-30 | End: 2021-10-31

## 2021-10-30 RX ORDER — MULTIPLE VITAMINS W/ MINERALS TAB 9MG-400MCG
1 TAB ORAL DAILY
Status: CANCELLED | OUTPATIENT
Start: 2021-10-31

## 2021-10-30 RX ORDER — MAGNESIUM SULFATE HEPTAHYDRATE 40 MG/ML
4 INJECTION, SOLUTION INTRAVENOUS ONCE
Status: COMPLETED | OUTPATIENT
Start: 2021-10-30 | End: 2021-10-30

## 2021-10-30 RX ORDER — FENTANYL CITRATE 50 UG/ML
INJECTION, SOLUTION INTRAMUSCULAR; INTRAVENOUS PRN
Status: DISCONTINUED | OUTPATIENT
Start: 2021-10-30 | End: 2021-10-30

## 2021-10-30 RX ORDER — POTASSIUM CHLORIDE 7.45 MG/ML
10 INJECTION INTRAVENOUS
Status: COMPLETED | OUTPATIENT
Start: 2021-10-30 | End: 2021-10-31

## 2021-10-30 RX ORDER — HYDROMORPHONE HCL IN WATER/PF 6 MG/30 ML
.2-.4 PATIENT CONTROLLED ANALGESIA SYRINGE INTRAVENOUS
Status: DISCONTINUED | OUTPATIENT
Start: 2021-10-30 | End: 2021-11-04 | Stop reason: HOSPADM

## 2021-10-30 RX ORDER — EPHEDRINE SULFATE 50 MG/ML
INJECTION, SOLUTION INTRAMUSCULAR; INTRAVENOUS; SUBCUTANEOUS PRN
Status: DISCONTINUED | OUTPATIENT
Start: 2021-10-30 | End: 2021-10-30

## 2021-10-30 RX ORDER — LABETALOL HYDROCHLORIDE 5 MG/ML
10 INJECTION, SOLUTION INTRAVENOUS
Status: DISCONTINUED | OUTPATIENT
Start: 2021-10-30 | End: 2021-10-30 | Stop reason: HOSPADM

## 2021-10-30 RX ORDER — ACETAMINOPHEN 325 MG/1
650 TABLET ORAL EVERY 6 HOURS PRN
Status: DISCONTINUED | OUTPATIENT
Start: 2021-10-30 | End: 2021-10-31

## 2021-10-30 RX ORDER — BUPIVACAINE HYDROCHLORIDE AND EPINEPHRINE 5; 5 MG/ML; UG/ML
INJECTION, SOLUTION PERINEURAL PRN
Status: DISCONTINUED | OUTPATIENT
Start: 2021-10-30 | End: 2021-10-30 | Stop reason: HOSPADM

## 2021-10-30 RX ORDER — NALOXONE HYDROCHLORIDE 0.4 MG/ML
0.2 INJECTION, SOLUTION INTRAMUSCULAR; INTRAVENOUS; SUBCUTANEOUS
Status: DISCONTINUED | OUTPATIENT
Start: 2021-10-30 | End: 2021-11-04 | Stop reason: HOSPADM

## 2021-10-30 RX ORDER — SODIUM CHLORIDE, SODIUM LACTATE, POTASSIUM CHLORIDE, CALCIUM CHLORIDE 600; 310; 30; 20 MG/100ML; MG/100ML; MG/100ML; MG/100ML
INJECTION, SOLUTION INTRAVENOUS CONTINUOUS
Status: DISCONTINUED | OUTPATIENT
Start: 2021-10-30 | End: 2021-10-30

## 2021-10-30 RX ORDER — FENTANYL CITRATE 50 UG/ML
25 INJECTION, SOLUTION INTRAMUSCULAR; INTRAVENOUS EVERY 5 MIN PRN
Status: DISCONTINUED | OUTPATIENT
Start: 2021-10-30 | End: 2021-10-30 | Stop reason: HOSPADM

## 2021-10-30 RX ORDER — SODIUM CHLORIDE, SODIUM LACTATE, POTASSIUM CHLORIDE, CALCIUM CHLORIDE 600; 310; 30; 20 MG/100ML; MG/100ML; MG/100ML; MG/100ML
INJECTION, SOLUTION INTRAVENOUS CONTINUOUS PRN
Status: DISCONTINUED | OUTPATIENT
Start: 2021-10-30 | End: 2021-10-30

## 2021-10-30 RX ORDER — HYDROMORPHONE HYDROCHLORIDE 1 MG/ML
0.2 INJECTION, SOLUTION INTRAMUSCULAR; INTRAVENOUS; SUBCUTANEOUS EVERY 5 MIN PRN
Status: DISCONTINUED | OUTPATIENT
Start: 2021-10-30 | End: 2021-10-30 | Stop reason: HOSPADM

## 2021-10-30 RX ORDER — DEXAMETHASONE SODIUM PHOSPHATE 4 MG/ML
INJECTION, SOLUTION INTRA-ARTICULAR; INTRALESIONAL; INTRAMUSCULAR; INTRAVENOUS; SOFT TISSUE PRN
Status: DISCONTINUED | OUTPATIENT
Start: 2021-10-30 | End: 2021-10-30

## 2021-10-30 RX ADMIN — PROPOFOL 20 MG: 10 INJECTION, EMULSION INTRAVENOUS at 01:14

## 2021-10-30 RX ADMIN — ONDANSETRON 4 MG: 2 INJECTION INTRAMUSCULAR; INTRAVENOUS at 02:17

## 2021-10-30 RX ADMIN — ROCURONIUM BROMIDE 10 MG: 50 INJECTION, SOLUTION INTRAVENOUS at 01:46

## 2021-10-30 RX ADMIN — Medication 5 MG: at 00:45

## 2021-10-30 RX ADMIN — PHENYLEPHRINE HYDROCHLORIDE 100 MCG: 10 INJECTION INTRAVENOUS at 02:11

## 2021-10-30 RX ADMIN — Medication 5 MG: at 00:37

## 2021-10-30 RX ADMIN — FENTANYL CITRATE 100 MCG: 50 INJECTION, SOLUTION INTRAMUSCULAR; INTRAVENOUS at 00:30

## 2021-10-30 RX ADMIN — FENTANYL CITRATE 25 MCG: 50 INJECTION INTRAMUSCULAR; INTRAVENOUS at 03:16

## 2021-10-30 RX ADMIN — SODIUM CHLORIDE, POTASSIUM CHLORIDE, SODIUM LACTATE AND CALCIUM CHLORIDE: 600; 310; 30; 20 INJECTION, SOLUTION INTRAVENOUS at 00:17

## 2021-10-30 RX ADMIN — PHENYLEPHRINE HYDROCHLORIDE 100 MCG: 10 INJECTION INTRAVENOUS at 00:45

## 2021-10-30 RX ADMIN — POTASSIUM CHLORIDE 10 MEQ: 7.46 INJECTION, SOLUTION INTRAVENOUS at 20:50

## 2021-10-30 RX ADMIN — POTASSIUM CHLORIDE 10 MEQ: 7.46 INJECTION, SOLUTION INTRAVENOUS at 23:22

## 2021-10-30 RX ADMIN — ROCURONIUM BROMIDE 10 MG: 50 INJECTION, SOLUTION INTRAVENOUS at 00:49

## 2021-10-30 RX ADMIN — LIDOCAINE HYDROCHLORIDE 100 MG: 20 INJECTION, SOLUTION INFILTRATION; PERINEURAL at 00:45

## 2021-10-30 RX ADMIN — METOPROLOL TARTRATE 2.5 MG: 5 INJECTION INTRAVENOUS at 08:50

## 2021-10-30 RX ADMIN — SODIUM CHLORIDE, POTASSIUM CHLORIDE, SODIUM LACTATE AND CALCIUM CHLORIDE: 600; 310; 30; 20 INJECTION, SOLUTION INTRAVENOUS at 02:45

## 2021-10-30 RX ADMIN — PIPERACILLIN AND TAZOBACTAM 4.5 G: 4; .5 INJECTION, POWDER, FOR SOLUTION INTRAVENOUS at 09:26

## 2021-10-30 RX ADMIN — OXYCODONE HYDROCHLORIDE 5 MG: 5 TABLET ORAL at 23:38

## 2021-10-30 RX ADMIN — FENTANYL CITRATE 50 MCG: 50 INJECTION, SOLUTION INTRAMUSCULAR; INTRAVENOUS at 01:11

## 2021-10-30 RX ADMIN — POTASSIUM CHLORIDE 10 MEQ: 7.46 INJECTION, SOLUTION INTRAVENOUS at 19:30

## 2021-10-30 RX ADMIN — POTASSIUM CHLORIDE 10 MEQ: 7.46 INJECTION, SOLUTION INTRAVENOUS at 22:01

## 2021-10-30 RX ADMIN — PIPERACILLIN AND TAZOBACTAM 4.5 G: 4; .5 INJECTION, POWDER, FOR SOLUTION INTRAVENOUS at 16:14

## 2021-10-30 RX ADMIN — SUGAMMADEX 200 MG: 100 INJECTION, SOLUTION INTRAVENOUS at 02:27

## 2021-10-30 RX ADMIN — PANTOPRAZOLE SODIUM 40 MG: 40 INJECTION, POWDER, FOR SOLUTION INTRAVENOUS at 08:50

## 2021-10-30 RX ADMIN — Medication 10 MG: at 00:35

## 2021-10-30 RX ADMIN — POTASSIUM CHLORIDE 10 MEQ: 7.46 INJECTION, SOLUTION INTRAVENOUS at 18:40

## 2021-10-30 RX ADMIN — PIPERACILLIN AND TAZOBACTAM 4.5 G: 4; .5 INJECTION, POWDER, FOR SOLUTION INTRAVENOUS at 22:02

## 2021-10-30 RX ADMIN — DEXAMETHASONE SODIUM PHOSPHATE 8 MG: 4 INJECTION, SOLUTION INTRA-ARTICULAR; INTRALESIONAL; INTRAMUSCULAR; INTRAVENOUS; SOFT TISSUE at 00:53

## 2021-10-30 RX ADMIN — FLECAINIDE ACETATE 50 MG: 50 TABLET ORAL at 12:55

## 2021-10-30 RX ADMIN — ACETAMINOPHEN 650 MG: 325 TABLET, FILM COATED ORAL at 11:00

## 2021-10-30 RX ADMIN — POTASSIUM CHLORIDE 10 MEQ: 7.46 INJECTION, SOLUTION INTRAVENOUS at 17:32

## 2021-10-30 RX ADMIN — POTASSIUM CHLORIDE 10 MEQ: 7.46 INJECTION, SOLUTION INTRAVENOUS at 23:03

## 2021-10-30 RX ADMIN — FLECAINIDE ACETATE 50 MG: 50 TABLET ORAL at 20:07

## 2021-10-30 RX ADMIN — PHENYLEPHRINE HYDROCHLORIDE 100 MCG: 10 INJECTION INTRAVENOUS at 00:37

## 2021-10-30 RX ADMIN — Medication 12.5 MG: at 20:07

## 2021-10-30 RX ADMIN — LIDOCAINE HYDROCHLORIDE 100 MG: 20 INJECTION, SOLUTION INFILTRATION; PERINEURAL at 00:30

## 2021-10-30 RX ADMIN — FENTANYL CITRATE 25 MCG: 50 INJECTION INTRAMUSCULAR; INTRAVENOUS at 03:32

## 2021-10-30 RX ADMIN — Medication 100 MG: at 00:30

## 2021-10-30 RX ADMIN — PROPOFOL 110 MG: 10 INJECTION, EMULSION INTRAVENOUS at 00:30

## 2021-10-30 RX ADMIN — OXYCODONE HYDROCHLORIDE 5 MG: 5 TABLET ORAL at 06:35

## 2021-10-30 RX ADMIN — MAGNESIUM SULFATE IN WATER 4 G: 40 INJECTION, SOLUTION INTRAVENOUS at 17:53

## 2021-10-30 ASSESSMENT — ACTIVITIES OF DAILY LIVING (ADL)
ADLS_ACUITY_SCORE: 12
ADLS_ACUITY_SCORE: 14
ADLS_ACUITY_SCORE: 12
ADLS_ACUITY_SCORE: 14
ADLS_ACUITY_SCORE: 16
ADLS_ACUITY_SCORE: 14
ADLS_ACUITY_SCORE: 16
ADLS_ACUITY_SCORE: 12
ADLS_ACUITY_SCORE: 10
ADLS_ACUITY_SCORE: 14
ADLS_ACUITY_SCORE: 12
ADLS_ACUITY_SCORE: 12
ADLS_ACUITY_SCORE: 14
ADLS_ACUITY_SCORE: 16
ADLS_ACUITY_SCORE: 14

## 2021-10-30 ASSESSMENT — ENCOUNTER SYMPTOMS
DYSRHYTHMIAS: 1
SEIZURES: 0

## 2021-10-30 ASSESSMENT — COPD QUESTIONNAIRES: COPD: 0

## 2021-10-30 NOTE — BRIEF OP NOTE
[facility]    Brief Operative Note    Pre-operative diagnosis: Small bowel obstruction (H) [K56.609]  Post-operative diagnosis Malpositioned G tube causing enterotomy and bowel obstruction    Procedure: Procedure(s):  LAPAROSCOPY, DIAGNOSTIC, TAKEDOWN OF MALPOSITIONED GASTROSTOMY TUBE, INSERTION OF GASTROSTOMY TUBE, SMALL BOWEL RESECTION X1, PRIMARY REPAIR OF SMALL BOWEL ENTEROTOMY, ABDOMINAL WASHOUT, TAP BLOCK  Surgeon: Surgeon(s) and Role:     * Leif Finney DO - Primary     * Munira Strickland MD - Resident - Assisting  Anesthesia: General   Estimated Blood Loss: 25ml    Drains: G tube (to gravity)    Specimens:   ID Type Source Tests Collected by Time Destination   1 : Small Bowel Tissue Other SURGICAL PATHOLOGY EXAM Leif Finney DO 10/30/2021  1:42 AM      Findings:   Tacks from the G tube punctured through small bowel, G tube through small bowel into stomach.  Complications: None.  Implants: * No implants in log *    Plan:  Return to floor  NPO  Dill to remain until AM  G tube to gravity  Hold heparin x24 hours    Munira Strickland DO  General Surgery PGY2

## 2021-10-30 NOTE — PHARMACY-ANTICOAGULATION SERVICE
Clinical Pharmacy - Warfarin Dosing Consult     Pharmacy has been consulted to manage this patient s warfarin therapy.  Indication: DVT/PE Prophylaxis;Atrial Fibrillation;Other - specify in comments   INR goal 1.5-2.5 in initial consult --> changed to 2-3 on 11/1 via pharmacist clarification with primary team  OP Anticoag Clinic: [facility] (home monitoring)  Warfarin Prior to Admission: Yes  Warfarin PTA Regimen: 3.75 mg every Friday, 2.5 mg all other days  Recent documented change in oral intake/nutrition: Yes    Of note, outpatient INR goal was 2 - 3, however is 1.5 - 2.5 inpatient.    INR   Date Value Ref Range Status   10/30/2021 1.32 (H) 0.85 - 1.15 Final   10/30/2021 1.32 (H) 0.85 - 1.15 Final     Factor 2 Assay   Date Value Ref Range Status   05/01/2007 58 (L) 60 - 140 % Final     Comment:     (Note)  CALLED TO TAMARA(INTERV. RADIOLOGY)[id number],        Recommend warfarin 5 mg, higher dose than normal since patient received 10 mg vitamin K on 1/29.  Warfarin to start 10/31 at 0015 per primary team. Pharmacy will monitor Haresh Rock daily and order warfarin doses to achieve specified goal.      Please contact pharmacy as soon as possible if the warfarin needs to be held for a procedure or if the warfarin goals change.      Alisia Hilario, PharmD - PGY1 Pharmacy Practice Resident    Addendum 11/2/2021 to update INR goal  Urmila Garcia, Pharm.D., [facility]  Pager [phone]

## 2021-10-30 NOTE — PROGRESS NOTES
"/75 (BP Location: Left arm)   Pulse 86   Temp 98.3  F (36.8  C) (Oral)   Resp 16   Ht 1.804 m (5' 11.02\")   Wt 68.5 kg (151 lb)   SpO2 97%   BMI 21.05 kg/m      Shift: 8265-0006  VS: VSS on 2L NC, afebrile  Neuro: AOx4  BG: q4h (132, 150); no insulin orders  Labs: Wbc 16.3  Respiratory: Diminished LS bilaterally, no c/o SOB  Pain/Nausea/PRN: Denies nausea. Abdominal incisional pain controlled w/ PRN tylenol x1.  Diet: NPO  LDA: PIV x3 w/ TKO; PEG to gravity drainage w/ minimal green output.  GI/: No BM since surgery, not passing gas. Dill w/ adequate UO.  Skin: Sacrum w/ balncahable redness; Mepilex in place, WOC consult placed. Midline abdominal incision, lap sites x3 approximated w/ steri strips. Bruising to L wrist.  Mobility: Assist x2 w/ GB, walker. Attempted to ambulate x1, pt c/o dizziness and weakness after a few steps.  Plan: Cardiology consulted. WOC consult placed. Continue POC, update provider as needed w/ concerns or changes in status.    Handoff given to following RN.    " Adequate: hears normal conversation without difficulty

## 2021-10-30 NOTE — PROGRESS NOTES
Admitted/transferred from: PACU  Time of arrival on unit 0430  2 RN full  skin assessment completed by Leslie RN, Sylvia RN  Skin assessment finding: Abdominal incision w/ lap sites x3, approximated w/ steri strips. Blanchable redness on sacrum. PEG tube. Bruising to L wrist.  Interventions/actions: WOC consult ordered     Will continue to monitor.

## 2021-10-30 NOTE — PROGRESS NOTES
Patient is s/p percutaneous gastrostomy tube placement by IR on 10/25/21.  Presented to ER yesterday with small bowel obstruction and CT showed G-tube traversing small bowel.  Patient went to the OR this morning where this finding was confirmed and he underwent G-tube removal with small bowel resection and new G-tube placement.     I saw the patient this afternoon in his room. Patient states he is feeling well other than some mild pain.  Looked well and was resting comfortably in bed, conversing easily. New G tube site clean.    I sat and discussed our complication with Haresh and what happened, and answered his questions. Haresh requested I call his daughter Gemma, so I did this shortly after and had a similar discussion with her and answered her questions as well.    We appreciate the prompt surgical management of this patient and his ongoing care by the medical and surgical teams.

## 2021-10-30 NOTE — ANESTHESIA PROCEDURE NOTES
Airway       Patient location during procedure: OR       Procedure Start/Stop Times: 10/30/2021 12:36 AM  Staff -        Anesthesiologist:  Yair Callejas MD       Resident/Fellow: Santy Mcqueen MD       Performed By: resident  Consent for Airway        Urgency: emergent       Consent: No emergent situation. Verbal consent obtained. Written consent obtained.       Consent given by: patient       Risks and benefits: risks, benefits and alternatives were discussed       Patient identity confirmed: verbally with patient and arm band    Indications and Patient Condition       Indications for airway management: wendi-procedural       Induction type:intravenous       Mask difficulty assessment: 0 - not attempted    Final Airway Details       Final airway type: endotracheal airway       Successful airway: ETT - single  Endotracheal Airway Details        ETT size (mm): 7.0       Cuffed: yes       Successful intubation technique: video laryngoscopy       VL Blade Size: MAC 3       Grade View of Cords: 1       Adjucts: stylet       Position: Right       Measured from: gums/teeth       Secured at (cm): 24       Bite block used: None    Post intubation assessment        Placement verified by: capnometry, equal breath sounds and chest rise        Number of attempts at approach: 1       Number of other approaches attempted: 0       Secured with: pink tape       Ease of procedure: easy       Dentition: Intact and Unchanged

## 2021-10-30 NOTE — PLAN OF CARE
"BP (!) 141/81   Pulse 87   Temp 97.4  F (36.3  C) (Oral)   Resp 16   Ht 1.804 m (5' 11.02\")   Wt 68.5 kg (151 lb)   SpO2 96%   BMI 21.05 kg/m      VS: Hypertensive/tacycardic at baseline. On Tele. Sinus rhythm  Pain/Nausea/PRN: denies  Diet: NPO  LDA: 3 PIV. One infusing LR @ 100ml/hr  GI/: gastrostomy, roberson  Skin: midline incision, 3 lap sites on abdomen. CDI.  Mobility: up with assist of 2 and gait belt.    "

## 2021-10-30 NOTE — ANESTHESIA PREPROCEDURE EVALUATION
Anesthesia Pre-Procedure Evaluation    Patient: Haresh Rock   MRN: [id number] : 1950        Preoperative Diagnosis: Small bowel obstruction (H) [K56.609]    Procedure : Procedure(s):  LAPAROSCOPY, DIAGNOSTIC, BY GENERAL SURGERY          Past Medical History:   Diagnosis Date     Abnormal dopamine scan (DaTSCAN) 2020    [phone] 11/3/2020   Narrative & Impression   Examination: Nuclear medicine DATscan for Dopamine Receptor Localization.   Examination: NM Brain Image Tomographic (SPECT) DATscan   Date: 11/3/2020 3:21 PM   Indication: Parkinsonism   Comparison: None   Additional Information: none   Interfering Medications: None   Technique:   The patient initially received 1 ml of Lugol's solution orally prior     Antiphospholipid antibody syndrome (H)     Ravi's     Antiphospholipid antibody syndrome (H)     Ravi's, noted negative per testing re-done in  in hematology note 2009     Articulation disorder 2020     Atrial fibrillation (H) 2011     Benign prostatic hyperplasia with urinary retention      Cirrhosis (H)      CKD (chronic kidney disease) stage 2, GFR 60-89 ml/min      Diabetes mellitus type II     steroid induced     DJD (degenerative joint disease) of knee     SHAWN, worse on right     DVT (deep venous thrombosis) (H)      ED (erectile dysfunction)      Gallbladder polyp 2010     Giern-Mohuqf-Dckl Disease     BMT     Hemochromatosis      Hodgkin's disease NOS     5 cycles of ABVD in      HTN (hypertension)      Hyperlipidaemia LDL goal <100      Multiple thyroid nodules     benign      Myeloproliferative disorder (H)     atypical, [facility]     Parkinson disease (H) 2020     PE (pulmonary embolism) 2004     Polyneuropathy      Primary pulmonary hypertension (H)      Thrombocytopenia (H) 2021      Past Surgical History:   Procedure Laterality Date     ANESTHESIA CARDIOVERSION  2011    Procedure:ANESTHESIA CARDIOVERSION;  Surgeon:GENERIC ANESTHESIA PROVIDER; Location:[facility] OR     ARTHROSCOPY KNEE RT/LT      LT     CATARACT IOL, RT/LT  2017     COLONOSCOPY  10/16/2012    Procedure: COLONOSCOPY;  Colonoscopy, screening;  Surgeon: Reg Feliciano MD;  Location: [facility] OR     COLONOSCOPY N/A 04/14/2021    Procedure: COLONOSCOPY;  Surgeon: Reg Holm MD;  Location: [facility] GI     ESOPHAGOSCOPY, GASTROSCOPY, DUODENOSCOPY (EGD), COMBINED N/A 10/07/2020    Procedure: ESOPHAGOGASTRODUODENOSCOPY, WITH BIOPSY;  Surgeon: Raul Sawyer DO;  Location: [facility] OR     H ABLATION FOCAL AFIB  03/05/2013    PVI     H ABLATION FOCAL AFIB  01/01/2018     HC BONE MARROW/STEM TRANSPLANT ALLOGENIC  05/08/2007     INSERT PORT VASCULAR ACCESS       JOINT REPLACEMTN, KNEE RT/LT  03/28/2012    SHAWN     PEG TUBE PLACEMENT  10/25/2021     VASECTOMY  1990      Allergies   Allergen Reactions     Seasonal Allergies       Social History     Tobacco Use     Smoking status: Never Smoker     Smokeless tobacco: Never Used   Substance Use Topics     Alcohol use: No      Wt Readings from Last 1 Encounters:   10/29/21 68.5 kg (151 lb)        Anesthesia Evaluation   Pt has had prior anesthetic.     No history of anesthetic complications       ROS/MED HX  ENT/Pulmonary:    (-) asthma, COPD, sleep apnea and PRATIBHA risk factors   Neurologic:     (+) Parkinson's disease,  (-) no seizures and no CVA   Cardiovascular:     (+) hypertension-----Taking blood thinners dysrhythmias, a-fib, Irregular Heartbeat/Palpitations,     METS/Exercise Tolerance: >4 METS    Hematologic: Comments: Myeloproliferative disorder       Musculoskeletal:   (+) C-spine cleared:NO (-) muscular dystrophy and cervical spine instability   GI/Hepatic: Comment: GVHD    (+) liver disease,     Renal/Genitourinary:     (+) renal disease,     Endo:     (+) type II DM,     Psychiatric/Substance Use:    (-) psychiatric history   Infectious Disease:  - neg infectious disease ROS     Malignancy:   (+) Malignancy, History of  Lymphoma/Leukemia and Other.Lymph CA Remission status post.        Other:      (+) , no H/O Chronic Pain,        Physical Exam    Airway        Mallampati: II   TM distance: > 3 FB   Neck ROM: full   Mouth opening: > 3 cm    Respiratory Devices and Support         Dental     Comment: Protruding incisors discuss about possible dental injury         Cardiovascular   cardiovascular exam normal          Pulmonary   pulmonary exam normal                OUTSIDE LABS:  CBC:   Lab Results   Component Value Date    WBC 14.3 (H) 10/29/2021    WBC 20.8 (H) 10/27/2021    HGB 15.1 10/29/2021    HGB 15.0 10/27/2021    HCT 42.8 10/29/2021    HCT 41.4 10/27/2021     (L) 10/29/2021     (L) 10/27/2021     BMP:   Lab Results   Component Value Date     10/29/2021     (L) 10/27/2021    POTASSIUM 3.5 10/29/2021    POTASSIUM 3.5 10/27/2021    CHLORIDE 93 (L) 10/29/2021    CHLORIDE 94 10/27/2021    CO2 36 (H) 10/29/2021    CO2 33 (H) 10/27/2021    BUN 28 10/29/2021    BUN 24 10/27/2021    CR 1.14 10/29/2021    CR 1.02 10/27/2021     (H) 10/29/2021     (H) 10/27/2021     COAGS:   Lab Results   Component Value Date    PTT 39 (H) 10/29/2021    INR 2.82 (H) 10/29/2021    FIBR 599 (H) 06/03/2009     POC:   Lab Results   Component Value Date    BGM 71 04/14/2021     HEPATIC:   Lab Results   Component Value Date    ALBUMIN 2.7 (L) 10/29/2021    PROTTOTAL 7.4 10/29/2021    ALT 10 10/29/2021    AST 20 10/29/2021     (H) 05/14/2011    ALKPHOS 86 10/29/2021    BILITOTAL 0.6 10/29/2021     OTHER:   Lab Results   Component Value Date    LACT 4.4 (H) 06/02/2009    A1C 5.5 08/20/2021    GILBERT 9.2 10/29/2021    PHOS 3.1 06/14/2011    MAG 1.7 10/14/2011    LIPASE 18 (L) 09/21/2013    AMYLASE 38 12/20/2008    TSH 1.49 08/20/2021    T4 0.97 04/30/2009    T3 82 07/17/2007    CRP 20.1 (H) 10/14/2021     (H) 10/14/2021     70 yo male w/ PMH of Parkinson's Disease with recently placement of PEG tube concerning  for perforation into small intestine.     Abdominal pain, vomiting, and imaging consistent with SBO caused by his G-tube with a trans-enteric path.   -RSI  -NG suction prior induction      #Atrial Fibrillation  - on flecainide   - Coumadin stopped on heparin gtt   - INR 2.8 (Administer 25 units of Kcentra by intravenous infusion at a rate of 0.12 mL/kg/min (~3 units/kg/min)   - Administer vitamin K  - esmolol/metoprolol ready    Anesthesia Plan    ASA Status:  3      Anesthesia Type: General.     - Airway: ETT   Induction: RSI.   Maintenance: Inhalation.   Techniques and Equipment:     - Airway: Video-Laryngoscope     - Lines/Monitors: 2nd IV     Consents    Anesthesia Plan(s) and associated risks, benefits, and realistic alternatives discussed. Questions answered and patient/representative(s) expressed understanding.     - Discussed with:  Patient, Other (See Comment)         Postoperative Care    Pain management: Oral pain medications, Multi-modal analgesia.   PONV prophylaxis: Ondansetron (or other 5HT-3)     Comments:                Yair Callejas MD

## 2021-10-30 NOTE — ANESTHESIA CARE TRANSFER NOTE
Patient: Haresh Rock    Procedure: Procedure(s):  LAPAROSCOPY, DIAGNOSTIC, TAKEDOWN OF MALPOSITIONED GASTROSTOMY TUBE, INSERTION OF GASTROSTOMY TUBE, SMALL BOWEL RESECTION X1, PRIMARY REPAIR OF SMALL BOWEL ENTEROTOMY, ABDOMINAL WASHOUT, TAP BLOCK       Diagnosis: Small bowel obstruction (H) [K56.609]  Diagnosis Additional Information: No value filed.    Anesthesia Type:   General     Note:    Oropharynx: oropharynx clear of all foreign objects  Level of Consciousness: awake  Oxygen Supplementation: face mask  Level of Supplemental Oxygen (L/min / FiO2): 6  Independent Airway: airway patency satisfactory and stable  Dentition: dentition unchanged  Vital Signs Stable: post-procedure vital signs reviewed and stable  Report to RN Given: handoff report given  Patient transferred to: PACU  Comments: VSS. Breathing spontaneously at a regular rate with adequate tidal volumes and maintaining O2 sats. Denies nausea or pain. No apparent complications from anesthesia.     Santy Mcqueen MD  Anesthesia CA-1    Handoff Report: Identifed the Patient, Identified the Reponsible Provider, Reviewed the pertinent medical history, Discussed the surgical course, Reviewed Intra-OP anesthesia mangement and issues during anesthesia, Set expectations for post-procedure period and Allowed opportunity for questions and acknowledgement of understanding      Vitals:  Vitals Value Taken Time   /73 10/30/21 0250   Temp     Pulse 76 10/30/21 0254   Resp     SpO2 100 % 10/30/21 0254   Vitals shown include unvalidated device data.    Electronically Signed By: Santy Mcqueen MD  October 30, 2021  2:55 AM

## 2021-10-30 NOTE — PROGRESS NOTES
Cardiology Inpatient Consultation  October 30, 2021    Reason for Consult:  A cardiology consult was requested by Dr. Jacob from the Medicine service to provide clinical guidance regarding Flecainide and afib management.    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 Pt presented with SBO related to G-tube and initially there was no plan for immediate surgery. He was kept NPO and metoprolol was switched to IV however there was a question regarding Flecainide administration in a patient with no enteral routes. However pt ended going to the OR overnight and had a new G-tube inserted which is currently functioning and has been cleared for use. Since Metoprolol succinate is not crushable, can consider Metoprolol tartrate 12.5mg BID instead. Should be able to give Flecainide through G-tube. Was on heparin drip for anticoagulation periop. Currently NSR. If there is an issue with G-tube this admission, OK to hold Flecainide temporarily since pt was doing fine while on IV Metoprolol only.  -Switch from Metoprolol succinate 25 to Metoprolol tartrate 12.5mg BID for compatibility with G-tube  -Continue Flecainide through G-tube  -Heparin to warfarin bridge per primary/surgical teams    Patient seen and discussed with Dr. Harvey, who agrees with above plan.      Thank you for consulting the cardiovascular services at the [facility]. Cardiology will sign off. Please do not hesitate to contact us with any questions.    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 He recently had a PEG tube placed on 10/25 and presented with SBP related to that PEG tube. Underwent laparoscopy with small bowel resection, abdominal washout and G-tube insertion. There was initially no plan for OR and since pt was kept NPO we were consulted regarding flecainide management given that no enteral routes were options. However pt was taken to the OR overnight and currently has a functional G-tube that has been cleared for use.     Regarding cardiac history, He was admitted with August 2021 with community acquired pneumonia. At that point he had some atypical chest pain and troponin was elevated at 2.3. EKG showed no acute changes. TTE wshowed an EF of 45-50% with inferior wall akinesis. He was treated as NICM and was started on Entresto and Empagliflozin. He was also on Metoprolol succinate. After discharge he was seen by Dr Bekah Matt in clinic. He was lethargic and had soft BP readings with SBPs in the 90s-100s requiring fluid administration in the clinic. His prior low EF was thought to be due to stress cardiomyopathy and his Empagliflozin and Entresto were discontinued. He underwent CMRI which showed normalization of EF further proving the stress cardiomyopathy diagnosis.  Currently takes Metoprolol succinate 25mg once daily, Flecainide 50mg BID and warfarin for his Afib    At the time of interview, the patient denies chest pain, dyspnea at rest or with exertion, orthopnea, PND, palpitations, lightheadedness, or syncope.     Review of Systems:    Complete review of systems was performed and negative except per HPI.    PMH:  Past Medical History:   Diagnosis Date     Abnormal dopamine scan (DaTSCAN) 2020 11/04/2020    [phone] 11/3/2020    Narrative & Impression   Examination: Nuclear medicine DATscan for Dopamine Receptor Localization.   Examination: NM Brain Image Tomographic (SPECT) DATscan   Date: 11/3/2020 3:21 PM   Indication: Parkinsonism   Comparison: None   Additional Information: none   Interfering Medications: None   Technique:   The patient initially received 1 ml of Lugol's solution orally prior     Antiphospholipid antibody syndrome (H) 2005    Ravi's     Antiphospholipid antibody syndrome (H)     Ravi's, noted negative per testing re-done in 2008 in hematology note 01/13/2009     Articulation disorder 09/23/2020     Atrial fibrillation (H) 04/2011     Benign prostatic hyperplasia with urinary retention      Cirrhosis (H)      CKD (chronic kidney disease) stage 2, GFR 60-89 ml/min      Diabetes mellitus type II     steroid induced     DJD (degenerative joint disease) of knee     SHAWN, worse on right     DVT (deep venous thrombosis) (H)      ED (erectile dysfunction)      Gallbladder polyp 05/2010     Saztl-Gztznb-Rchp Disease 2007    BMT     Hemochromatosis      Hodgkin's disease NOS     5 cycles of ABVD in 2011     HTN (hypertension)      Hyperlipidaemia LDL goal <100      Multiple thyroid nodules     benign      Myeloproliferative disorder (H)     atypical, [facility]     Parkinson disease (H) 09/24/2020     PE (pulmonary embolism) 11/2004     Polyneuropathy      Primary pulmonary hypertension (H)      Thrombocytopenia (H) 06/08/2021     Active Problems:  Patient Active Problem List    Diagnosis Date Noted     Atrial fibrillation (H) 03/04/2013     Priority: High     Hyperlipidemia with target LDL less than 100 09/11/2012     Priority: High     Status post bone marrow transplant (H) 08/29/2012     Priority: High     Polyneuropathy      Priority: High     Mphlb-fqkaox-fugh disease, chronic (H) 05/21/2011     Priority: High     Hemochromatosis 06/02/2010     Priority: High     Small bowel obstruction (H) 10/29/2021     Priority: Medium      Gastrostomy tube in place (H) 10/27/2021     Priority: Medium     Parkinson's disease (H) 08/19/2021     Priority: Medium     Constipation 07/27/2021     Priority: Medium     Benign prostatic hyperplasia with urinary retention      Priority: Medium     Paroxysmal atrial fibrillation (H) 06/18/2021     Priority: Medium     Thrombocytopenia (H) 06/08/2021     Priority: Medium     Abnormal dopamine scan (DaTSCAN) 2020 11/04/2020     Priority: Medium     [phone] 11/3/2020       Narrative & Impression     Examination: Nuclear medicine DATscan for Dopamine Receptor  Localization.    Examination: NM Brain Image Tomographic (SPECT) DATscan     Date: 11/3/2020 3:21 PM     Indication: Parkinsonism     Comparison: None     Additional Information: none     Interfering Medications: None     Technique:     The patient initially received 1 ml of Lugol's solution orally prior  to the injection of 4.77 mCi of I-123 Ioflupane intravenously.     After 3 hours of uptake time the patient was imaged on a dual headed  SPECT scanner using LOREN collimators. The patients head was  immobilized prior to scanning to reduce motion. The head was scanned  with a FOV of 15 cm at 3 degrees per stop over 360 degrees, 128 x 128  x 16 bit resolution, at 30 seconds per stop to yield greater than 1.5  million counts.     Images were reconstructed using a iterative reconstruction technique.  Semi-quantitative analysis was performed as a ratio of Putamen to  Caudate Nucleus of the right and left brain when the Caudate uptake  appeared normal.     2. Technical Quality: Excellent     Subjective findings:     Relatively symmetric radiotracer uptake to the basal ganglia. There is  decreased uptake to putamina bilaterally.     Ratio Semi-quantitative analysis to Normalized (Occipital) Region:  (Ratios of activity in Caudate, Putamen and Striatum (combined)  normalized to background region (occipital cortex) and compared  against an age matched normal  group.     Right Caudate  ratio: 2.1,  Right Putamen: 2.0, Right Striatum: 2.1  Left Caudate  ratio: 2.2, Left Putamen: 1.8, Left Striatum: 2.1     Normal ratios:  (normal range is 2.5-8)                                                                      Impression:  A presynaptic dopaminergic deficit is demonstrated bilaterally.        I have personally reviewed the examination and initial interpretation  and I agree with the findings.     LEONARDO HENDRICKS MD            Articulation disorder 09/23/2020     Priority: Medium     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) 09/23/2020     Priority: Medium     Overview: from iron overload         Oropharyngeal dysphagia 09/12/2020     Priority: Medium     Hereditary hemochromatosis (H) 07/06/2020     Priority: Medium     Primary pulmonary hypertension (H)      Priority: Medium     History of Hodgkin's lymphoma 03/24/2017     Priority: Medium     History of irradiation, presenting hazards to health 03/24/2017     Priority: Medium     Type 2 diabetes mellitus with stage 2 chronic kidney disease, without long-term current use of insulin (H) 09/20/2016     Priority: Medium     Thyroid nodule 05/17/2016     Priority: Medium     Long-term (current) use of anticoagulants [Z79.01] 04/19/2016     Priority: Medium     Chronic rhinitis 07/09/2014     Priority: Medium     Gallstones without obstruction of gallbladder 07/09/2014     Priority: Medium     ACP (advance care planning) 03/29/2012     Priority: Medium     Formatting of this note is different from the original.  Patient has identified Health Care Agent(s): Yes  Add Health Care Agents: Yes    Health Care Agent(s):  Primary Health Care Agent: sheyla Relationship: wife Phone:    Secondary Health Care Agent:  Relationship:  Phone:    Conservator:  Relationship:  Phone:    Guardian: Relationship:  Phone:      Patient has Advance Care Plan Documents (Health Care Directive, POLST): No, .    Patient has identified Specific  Treatment Preferences: Yes   Specific Treatment Preferences: a.) Code Status:  CPR/Attempt Resuscitation       Personal history of PE (pulmonary embolism) 03/28/2012     Priority: Medium     Status post total knee replacements 04/24/2012     Priority: Low     Dr. Eber Toth, [facility]       Advanced directives, counseling/discussion 03/21/2012     Priority: Low     Living will on file 1/11/2018       Social History:  Social History     Tobacco Use     Smoking status: Never Smoker     Smokeless tobacco: Never Used   Substance Use Topics     Alcohol use: No     Drug use: No     Family History:  Family History   Problem Relation Age of Onset     Hypertension Mother      Cerebrovascular Disease Mother      Breast Cancer Mother      Arrhythmia Brother      Arrhythmia Sister         supraventricular tachycardia     Thyroid Disease Sister      Other - See Comments Son         lives in [place]. no kids and not .     Obsessive Compulsive Disorder Son      Depression Son      Eating Disorder Son         eats when depressed     Obesity Son      Thyroid Cancer Daughter         had thyroid removed     Bipolar Disorder Daughter      Other - See Comments Daughter         lives in [place] - single with one son 9  yrs     Other - See Comments Sister      Alzheimer Disease Father      Diabetes Paternal Aunt      Gastrointestinal Disease Maternal Grandmother         colitis      Parkinsonism Other         2 cousins with parkinson     C.A.D. No family hx of        Medications:    dutasteride  0.5 mg Oral Daily     flecainide  50 mg Oral Q12H JEAN-CLAUDE     metoprolol succinate ER  25 mg Oral Daily     [Held by provider] multivitamin w/minerals  1 tablet Oral Daily     [START ON 10/31/2021] pantoprazole  40 mg Oral QAM AC     [Held by provider] pantoprazole (PROTONIX) IV  40 mg Intravenous Daily with breakfast     piperacillin-tazobactam  4.5 g Intravenous Q6H     potassium chloride  60 mEq Oral Once     [Held  by provider] rosuvastatin  40 mg Oral At Bedtime     [Held by provider] sodium chloride (PF)  3 mL Intracatheter Q8H     [Held by provider] sodium fluoride dental gel   Dental Daily     [Held by provider] vitamin D3  50 mcg Oral Daily         [Held by provider] heparin Stopped (10/29/21 2113)     - MEDICATION INSTRUCTIONS -       Patient RECEIVING antibiotic to treat a different condition and it provides ADEQUATE COVERAGE for this surgical procedure.         Physical Exam:  Temp:  [96.7  F (35.9  C)-98.3  F (36.8  C)] 98.3  F (36.8  C)  Pulse:  [75-94] 86  Resp:  [13-41] 16  BP: (129-167)/(75-92) 135/75  SpO2:  [95 %-100 %] 97 %    Intake/Output Summary (Last 24 hours) at 10/30/2021 1528  Last data filed at 10/30/2021 1126  Gross per 24 hour   Intake 1802 ml   Output 2205 ml   Net -403 ml     GEN: pleasant, no acute distress  HEENT: no icterus  CV: RRR, normal s1/s2, no murmurs/rubs/s3/s4, no heave. JVP not elevated.   CHEST: clear to ausculation bilaterally, no rales or wheezing  ABD: soft, non-tender, normal active bowel sounds  EXTR: pulses regular. No clubbing, cyanosis or edema.   NEURO: alert oriented, speech fluent/appropriate, motor grossly nonfocal      Diagnostics:  All labs and imaging were reviewed, of note:    CMP  Recent Labs   Lab 10/30/21  1151 10/30/21  0844 10/30/21  0636 10/29/21  1259 10/27/21  1257 10/27/21  1257   NA  --   --  136 134  --  132*   POTASSIUM  --   --  3.1* 3.5  --  3.5   CHLORIDE  --   --  98 93*  --  94   CO2  --   --  27 36*  --  33*   ANIONGAP  --   --  11 5  --  5   * 132* 140* 145*   < > 170*   BUN  --   --  30 28  --  24   CR  --   --  1.21 1.14  --  1.02   GFRESTIMATED  --   --  59* 63  --  73   GILBERT  --   --  8.6 9.2  --  9.9   PROTTOTAL  --   --  6.3* 7.4  --  7.8   ALBUMIN  --   --  2.3* 2.7*  --  3.3*   BILITOTAL  --   --  0.7 0.6  --  0.8   ALKPHOS  --   --  69 86  --  104   AST  --   --  16 20  --  20   ALT  --   --  9 10  --  11    < > = values in this  interval not displayed.     CBC  Recent Labs   Lab 10/30/21  0636 10/29/21  2258 10/29/21  1259 10/27/21  1258   WBC 16.3* 15.0* 14.3* 20.8*   RBC 4.47 4.68 4.73 4.74   HGB 14.5 14.8 15.1 15.0   HCT 41.2 42.1 42.8 41.4   MCV 92 90 91 87   MCH 32.4 31.6 31.9 31.6   MCHC 35.2 35.2 35.3 36.2   RDW 13.2 13.2 13.1 12.9   PLT 95* 127* 120* 128*     INR  Recent Labs   Lab 10/30/21  1418 10/30/21  0638 10/29/21  2258 10/29/21  1705   INR 1.32* 1.32* 1.44* 2.82*     Arterial Blood GasNo lab results found in last 7 days.    Lab Results   Component Value Date    TROPI 0.025 12/20/2008    TROPONIN 1.751 () 08/20/2021    TROPONIN 2.375 () 08/19/2021    TROPONIN 0.660 () 08/19/2021    TROPONIN 0.586 () 08/19/2021    TROPONIN 0.05 10/15/2011       EKG:    Transthoracic echocardiogram:     Nuclear stress test:

## 2021-10-30 NOTE — ANESTHESIA POSTPROCEDURE EVALUATION
Patient: Haresh Rock    Procedure: Procedure(s):  LAPAROSCOPY, DIAGNOSTIC, TAKEDOWN OF MALPOSITIONED GASTROSTOMY TUBE, INSERTION OF GASTROSTOMY TUBE, SMALL BOWEL RESECTION X1, PRIMARY REPAIR OF SMALL BOWEL ENTEROTOMY, ABDOMINAL WASHOUT, TAP BLOCK       Diagnosis:Small bowel obstruction (H) [K56.609]    Anesthesia Type:  General    Note:     Postop Pain Control: Uneventful            Sign Out: Well controlled pain   PONV:    Neuro/Psych: Uneventful            Sign Out: Acceptable/Baseline neuro status   Airway/Respiratory: Uneventful            Sign Out: Acceptable/Baseline resp. status   CV/Hemodynamics: Uneventful            Sign Out: Acceptable CV status; No obvious hypovolemia; No obvious fluid overload   Other NRE: NONE   DID A NON-ROUTINE EVENT OCCUR?            Last vitals:  Vitals Value Taken Time   /76 10/30/21 0350   Temp 36.4  C (97.6  F) 10/30/21 0345   Pulse 86 10/30/21 0358   Resp 16 10/30/21 0345   SpO2 96 % 10/30/21 0405   Vitals shown include unvalidated device data.    Electronically Signed By: Yair Callejas MD  October 30, 2021  7:23 AM

## 2021-10-30 NOTE — OP NOTE
Operative Note     Pre-operative diagnosis:         Small bowel obstruction (H) [K56.609]  Post-operative diagnosis        Malpositioned G tube causing enterotomy and bowel obstruction     Procedure:      Procedure(s):  LAPAROSCOPY, DIAGNOSTIC, TAKEDOWN OF MALPOSITIONED GASTROSTOMY TUBE, INSERTION OF GASTROSTOMY TUBE, SMALL BOWEL RESECTION X1, PRIMARY REPAIR OF SMALL BOWEL ENTEROTOMY, ABDOMINAL WASHOUT, TAP BLOCK    Surgeon:         Surgeon(s) and Role:     * Leif Finney DO - Primary     * Munira Strickland DO - Resident - Assisting  Anesthesia:     General             Estimated Blood Loss: 25ml     Drains: G tube (to gravity) - bumper 2cm at the skin     Specimens:       ID Type Source Tests Collected by Time Destination   1 : Small Bowel Tissue Other SURGICAL PATHOLOGY EXAM Leif Finney DO 10/30/2021  1:42 AM       HPI:   Mr. Rock is a 72yo man with Parkinson's disease with dysphagia and chronic constipation, afib (on coumadin), myeloproliferative disorder s/p BMT who presents with abdominal pain and distension after PEG placement by IR on 10/25/2021. On CT he was found to have distended stomach and small bowels suggestive of SBO with transition point at G tube which appears to go through small bowel.     Description of Procedure:   The patient was prepped and draped in the standard fashion in the supine position. A varess needle was used in the left upper quadrant to establish pneumoperitoneum. A 5mm trochar was placed using optview entry into the abdomen. Two more 5mm ports were placed in the mid abdomen under direct visualizaton. The stomach and small bowel were noted to be attached to the anterior abdominal wall at both the T-fastener sites as well as at the G tube itself, that traversed the small bowel prior to entry into the stomach. The T-fasteners were cut, releasing the lateral aspects of the bowel. Next, the G tube balloon was deflated, brought out of the stomach and  small bowel, and fed back into the stomach. The G tube balloon was then re-inflated and pulled back up to the abdominal wall. At this point a mini midline laparotomy was made and an caesar wound protector placed. The damaged small bowel was brought out of the abdomen. An approximately 10cm segment of small bowel was resected and re-anastomosed in a side to side, functional end to end orientation using 2 TONIA 100 stapler loads. Next, a small enterotomy was repaired primarily using 3-0 silk suture. The abdomen was irrigated with 2L warm saline and the fascia closed with running 1-0 PDS. The abdomen was re-evaluated laparoscopically to ensure adequate hemostasis, no gross spillage, and proper placement of the replaced G tube against the abdominal wall. Laparoscopic TAP block was performed with 0.5% marcaine.  The port sites were removed, abdomen deflated, skin incisions irrigated and closed with 4-0 vicryl suture, and finally covered with skin glue. The sponge, instrument, and needle counts were correct at the end of the case.    The patient tolerated the procedure well with no immediate complications.     Dr. Finney was present for the entirety of the case.    Munira Strickland DO  General Surgery PGY2

## 2021-10-30 NOTE — PHARMACY
"The following home medications were NOT continued on inpatient admission per \"Discontinuation of nonessential home medications during hospitalization\" policy: STARCH-MALTO DEXTRIN POWD    If a therapeutic holiday is deemed inappropriate per the prescriber, please notify the pharmacist regarding the medication order.    The pharmacist is available to answer any questions and/or concerns the patient may have regarding discontinuation of non-essential medications.    Please ensure that these medications are restarted as needed upon discharge via the medication reconciliation discharge process and included on the discharge medication reconciliation report.    Thank you,  Lebron Frazier [facility]    "

## 2021-10-30 NOTE — PROGRESS NOTES
General Surgery Progress Note  10/30/2021   ------------------------------------------------------------------------------------------------  Subjective:    Feels better this morning. Bloating improved, has abdominal pain around new G tube site. No nausea or vomiting. Some burping. Not passing gas, no BM. Dill in place. Needs assistance for ambulation.   ------------------------------------------------------------------------------------------------  Objective:  Temp:  [96.7  F (35.9  C)-97.6  F (36.4  C)] 97.6  F (36.4  C)  Pulse:  [69-94] 93  Resp:  [13-41] 16  BP: (129-194)/(75-99) 130/79  SpO2:  [95 %-100 %] 97 %    I/O last 3 completed shifts:  In: 1402 [I.V.:1400; IV Piggyback:2]  Out: 1795 [Urine:220; Emesis/NG output:1550; Blood:25]      Gen: Awake, interactive, NAD  Resp: breathing comfortably on room air  CV: regular rate, appears well perfused  Abd: soft, nondistended, appropriately tender to palpation, dressings in place, G tube to gravity   Incision: c/d/I  Ext: palpable pulses, no edema     Labs:  Recent Labs   Lab 10/30/21  0636 10/29/21  1755 10/29/21  1259   WBC 16.3* 15.0* 14.3*   HGB 14.5 14.8 15.1   PLT 95* 127* 120*       Recent Labs   Lab 10/30/21  0844 10/29/21  1259 10/27/21  1257   NA  --  134 132*   POTASSIUM  --  3.5 3.5   CHLORIDE  --  93* 94   CO2  --  36* 33*   BUN  --  28 24   CR  --  1.14 1.02   * 145* 170*   GILBERT  --  9.2 9.9       Imaging:  No new imaging     =======================================================  Assessment/Plan:   71 year old male with Parkinson's disease with dysphagia and chronic constipation, afib (on coumadin), myeloproliferative disorder s/p BMT who presented with abdominal pain and distension after PEG placement by IR on 10/25/2021. On CT he was found to have distended stomach and small bowels suggestive of SBO with transition point at G tube which goes through small bowel. Now s/p diagnostic laparoscopy, small bowel resection, abdominal  washout, insertion of G tube on 10/30.      - Okay to remove roberson today  - Continue NPO and hold tube feeds until ROBF, likely will need SLP consult before advancing diet  - If he starts passing gas today okay to start trickle tube feeds through G tube. Plan to start TFs tomorrow.   - Recommend IVF  - Okay for meds via G tube  - No abx needed from GI perspective. Continue abx for lung/bladder infection per primary team.  - Recommend PT/OT  - patient's daughter requesting social work consult  - Hold heparin 24h post-op, can restart tomorrow morning                                                                                       Seen with chief resident and staff surgeon.     Betzy Russo MD  PGY-1 General Surgery

## 2021-10-31 ENCOUNTER — APPOINTMENT (OUTPATIENT)
Dept: OCCUPATIONAL THERAPY | Facility: CLINIC | Age: 71
DRG: 326 | End: 2021-10-31
Payer: COMMERCIAL

## 2021-10-31 LAB
ALBUMIN SERPL-MCNC: 2.1 G/DL (ref 3.4–5)
ALP SERPL-CCNC: 62 U/L (ref 40–150)
ALT SERPL W P-5'-P-CCNC: 7 U/L (ref 0–70)
ANION GAP SERPL CALCULATED.3IONS-SCNC: 5 MMOL/L (ref 3–14)
AST SERPL W P-5'-P-CCNC: 18 U/L (ref 0–45)
BILIRUB SERPL-MCNC: 1.1 MG/DL (ref 0.2–1.3)
BUN SERPL-MCNC: 35 MG/DL (ref 7–30)
CALCIUM SERPL-MCNC: 8.3 MG/DL (ref 8.5–10.1)
CHLORIDE BLD-SCNC: 100 MMOL/L (ref 94–109)
CO2 SERPL-SCNC: 31 MMOL/L (ref 20–32)
CREAT SERPL-MCNC: 1.34 MG/DL (ref 0.66–1.25)
CRP SERPL-MCNC: 150 MG/L (ref 0–8)
ERYTHROCYTE [DISTWIDTH] IN BLOOD BY AUTOMATED COUNT: 13.2 % (ref 10–15)
GFR SERPL CREATININE-BSD FRML MDRD: 52 ML/MIN/1.73M2
GLUCOSE BLD-MCNC: 95 MG/DL (ref 70–99)
GLUCOSE BLDC GLUCOMTR-MCNC: 105 MG/DL (ref 70–99)
GLUCOSE BLDC GLUCOMTR-MCNC: 92 MG/DL (ref 70–99)
GLUCOSE BLDC GLUCOMTR-MCNC: 96 MG/DL (ref 70–99)
GLUCOSE BLDC GLUCOMTR-MCNC: 96 MG/DL (ref 70–99)
HCT VFR BLD AUTO: 40.6 % (ref 40–53)
HGB BLD-MCNC: 13.9 G/DL (ref 13.3–17.7)
HOLD SPECIMEN: NORMAL
INR PPP: 1.48 (ref 0.85–1.15)
MCH RBC QN AUTO: 32 PG (ref 26.5–33)
MCHC RBC AUTO-ENTMCNC: 34.2 G/DL (ref 31.5–36.5)
MCV RBC AUTO: 94 FL (ref 78–100)
PLATELET # BLD AUTO: 83 10E3/UL (ref 150–450)
POTASSIUM BLD-SCNC: 4 MMOL/L (ref 3.4–5.3)
PROCALCITONIN SERPL-MCNC: 2.72 NG/ML
PROT SERPL-MCNC: 6.2 G/DL (ref 6.8–8.8)
RBC # BLD AUTO: 4.34 10E6/UL (ref 4.4–5.9)
SODIUM SERPL-SCNC: 136 MMOL/L (ref 133–144)
UFH PPP CHRO-ACNC: 0.17 IU/ML
UFH PPP CHRO-ACNC: 0.76 IU/ML
UFH PPP CHRO-ACNC: <0.1 IU/ML
WBC # BLD AUTO: 12.8 10E3/UL (ref 4–11)

## 2021-10-31 PROCEDURE — 250N000011 HC RX IP 250 OP 636: Performed by: STUDENT IN AN ORGANIZED HEALTH CARE EDUCATION/TRAINING PROGRAM

## 2021-10-31 PROCEDURE — 250N000013 HC RX MED GY IP 250 OP 250 PS 637

## 2021-10-31 PROCEDURE — 99233 SBSQ HOSP IP/OBS HIGH 50: CPT | Mod: GC | Performed by: HOSPITALIST

## 2021-10-31 PROCEDURE — 250N000011 HC RX IP 250 OP 636

## 2021-10-31 PROCEDURE — 36415 COLL VENOUS BLD VENIPUNCTURE: CPT | Performed by: STUDENT IN AN ORGANIZED HEALTH CARE EDUCATION/TRAINING PROGRAM

## 2021-10-31 PROCEDURE — 250N000013 HC RX MED GY IP 250 OP 250 PS 637: Performed by: HOSPITALIST

## 2021-10-31 PROCEDURE — 999N000226 HC STATISTIC SLP IP EVAL DEFER

## 2021-10-31 PROCEDURE — 84145 PROCALCITONIN (PCT): CPT | Performed by: HOSPITALIST

## 2021-10-31 PROCEDURE — 85520 HEPARIN ASSAY: CPT | Performed by: STUDENT IN AN ORGANIZED HEALTH CARE EDUCATION/TRAINING PROGRAM

## 2021-10-31 PROCEDURE — 80053 COMPREHEN METABOLIC PANEL: CPT

## 2021-10-31 PROCEDURE — 36415 COLL VENOUS BLD VENIPUNCTURE: CPT | Performed by: HOSPITALIST

## 2021-10-31 PROCEDURE — 120N000011 HC R&B TRANSPLANT UMMC

## 2021-10-31 PROCEDURE — 97165 OT EVAL LOW COMPLEX 30 MIN: CPT | Mod: GO

## 2021-10-31 PROCEDURE — 85520 HEPARIN ASSAY: CPT | Performed by: HOSPITALIST

## 2021-10-31 PROCEDURE — 97535 SELF CARE MNGMENT TRAINING: CPT | Mod: GO

## 2021-10-31 PROCEDURE — 250N000013 HC RX MED GY IP 250 OP 250 PS 637: Performed by: STUDENT IN AN ORGANIZED HEALTH CARE EDUCATION/TRAINING PROGRAM

## 2021-10-31 PROCEDURE — 85610 PROTHROMBIN TIME: CPT | Performed by: STUDENT IN AN ORGANIZED HEALTH CARE EDUCATION/TRAINING PROGRAM

## 2021-10-31 PROCEDURE — 85027 COMPLETE CBC AUTOMATED: CPT | Performed by: STUDENT IN AN ORGANIZED HEALTH CARE EDUCATION/TRAINING PROGRAM

## 2021-10-31 PROCEDURE — 86140 C-REACTIVE PROTEIN: CPT

## 2021-10-31 RX ORDER — ACETAMINOPHEN 325 MG/10.15ML
650 LIQUID ORAL EVERY 6 HOURS PRN
Status: DISCONTINUED | OUTPATIENT
Start: 2021-10-31 | End: 2021-11-01

## 2021-10-31 RX ORDER — OXYCODONE HCL 5 MG/5 ML
5 SOLUTION, ORAL ORAL EVERY 4 HOURS PRN
Status: DISCONTINUED | OUTPATIENT
Start: 2021-10-31 | End: 2021-11-01

## 2021-10-31 RX ORDER — METOPROLOL SUCCINATE 25 MG/1
25 TABLET, EXTENDED RELEASE ORAL DAILY
Status: DISCONTINUED | OUTPATIENT
Start: 2021-11-01 | End: 2021-11-01 | Stop reason: ALTCHOICE

## 2021-10-31 RX ORDER — DEXTROSE MONOHYDRATE 100 MG/ML
INJECTION, SOLUTION INTRAVENOUS CONTINUOUS PRN
Status: DISCONTINUED | OUTPATIENT
Start: 2021-10-31 | End: 2021-11-04 | Stop reason: HOSPADM

## 2021-10-31 RX ORDER — WARFARIN SODIUM 2.5 MG/1
2.5 TABLET ORAL
Status: COMPLETED | OUTPATIENT
Start: 2021-10-31 | End: 2021-10-31

## 2021-10-31 RX ADMIN — HEPARIN SODIUM 800 UNITS/HR: 10000 INJECTION, SOLUTION INTRAVENOUS at 16:05

## 2021-10-31 RX ADMIN — HEPARIN SODIUM 800 UNITS/HR: 10000 INJECTION, SOLUTION INTRAVENOUS at 01:08

## 2021-10-31 RX ADMIN — ACETAMINOPHEN 650 MG: 325 TABLET, FILM COATED ORAL at 15:52

## 2021-10-31 RX ADMIN — Medication 12.5 MG: at 07:40

## 2021-10-31 RX ADMIN — WARFARIN SODIUM 2.5 MG: 2.5 TABLET ORAL at 18:02

## 2021-10-31 RX ADMIN — Medication 12.5 MG: at 21:04

## 2021-10-31 RX ADMIN — PIPERACILLIN AND TAZOBACTAM 4.5 G: 4; .5 INJECTION, POWDER, FOR SOLUTION INTRAVENOUS at 05:43

## 2021-10-31 RX ADMIN — Medication 1 TABLET: at 07:40

## 2021-10-31 RX ADMIN — FLECAINIDE ACETATE 50 MG: 50 TABLET ORAL at 21:04

## 2021-10-31 RX ADMIN — HEPARIN SODIUM 950 UNITS/HR: 10000 INJECTION, SOLUTION INTRAVENOUS at 23:52

## 2021-10-31 RX ADMIN — ROSUVASTATIN CALCIUM 40 MG: 10 TABLET, FILM COATED ORAL at 22:12

## 2021-10-31 RX ADMIN — POTASSIUM CHLORIDE 10 MEQ: 7.46 INJECTION, SOLUTION INTRAVENOUS at 00:12

## 2021-10-31 RX ADMIN — OXYCODONE HYDROCHLORIDE 5 MG: 5 TABLET ORAL at 15:52

## 2021-10-31 RX ADMIN — PIPERACILLIN AND TAZOBACTAM 4.5 G: 4; .5 INJECTION, POWDER, FOR SOLUTION INTRAVENOUS at 10:22

## 2021-10-31 RX ADMIN — FLECAINIDE ACETATE 50 MG: 50 TABLET ORAL at 07:40

## 2021-10-31 RX ADMIN — WARFARIN SODIUM 5 MG: 5 TABLET ORAL at 00:48

## 2021-10-31 RX ADMIN — OXYCODONE HYDROCHLORIDE 5 MG: 5 TABLET ORAL at 09:31

## 2021-10-31 ASSESSMENT — ACTIVITIES OF DAILY LIVING (ADL)
ADLS_ACUITY_SCORE: 16
ADLS_ACUITY_SCORE: 16
ADLS_ACUITY_SCORE: 14
ADLS_ACUITY_SCORE: 14
ADLS_ACUITY_SCORE: 16
ADLS_ACUITY_SCORE: 14
ADLS_ACUITY_SCORE: 16
ADLS_ACUITY_SCORE: 14
ADLS_ACUITY_SCORE: 16
PREVIOUS_RESPONSIBILITIES: MEAL PREP;HOUSEKEEPING;LAUNDRY;SHOPPING;YARDWORK;MEDICATION MANAGEMENT;FINANCES;DRIVING
ADLS_ACUITY_SCORE: 16
ADLS_ACUITY_SCORE: 16
ADLS_ACUITY_SCORE: 14
ADLS_ACUITY_SCORE: 14
ADLS_ACUITY_SCORE: 16
ADLS_ACUITY_SCORE: 14
ADLS_ACUITY_SCORE: 16
ADLS_ACUITY_SCORE: 14

## 2021-10-31 NOTE — PLAN OF CARE
"/77 (BP Location: Right arm)   Pulse 82   Temp 97.8  F (36.6  C) (Oral)   Resp 14   Ht 1.804 m (5' 11.02\")   Wt 68.5 kg (151 lb)   SpO2 97%   BMI 21.05 kg/m      Assumed cares [id number]  Neuro: A/Ox4  Pain/Nausea: denies pain and nausea  Cardiac: tele- normal sinus  Resp: lung sounds diminished, equal bilaterally  GI/: no BM since surgery, not passing gas; Dill w/ moderate output, leaking large amounts  Diet/Appetite: strict NPO, pt has difficulty swallowing, needs a swallow study  BG: q4, no insulin ordered  Skin: blanchable redness on coccyx- Mepilex; abdominal incision- dermabond; lap sites x3- dermabond; bruising on L wrist  Access: PIV- Kclor replace; PIV- TKO + abx; PIV- saline locked  Drains: Dill- leaking; PEG- gravity  Activity: Ax2 + walker and gait belt  Plan: remove Dill and restart Tf tomorrow?  Will continue with plan of care and notify team of any changes.?    Elizabeth Ghosh RN on 10/30/2021 at 11:25 PM      "

## 2021-10-31 NOTE — PROGRESS NOTES
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    #Abdominal Pain  #Small bowel obstruction  #Trans-enteric course of the G tube through small bowel  #S/P g-tube correction and small bowel resection  Presented with abdominal pain, vomiting found on imaging to have SBO caused by his G-tube with a trans-enteric path. S/p diagnostic laparoscopy, small bowel resection, abdominal washout, and insertion of G tube on 10/30. Awaiting return of bowel function. IR discussed case with family 10/30.   - Surgery following   - Start trophic feeds at 15 ml/h   - Okay for meds via G tube   - Remove robreson, trial external catheter   - Stop Zosyn  - PT/OT  - Family interested in discussion with patient relations, provided contact information    #Paroxysmal atrial fibrillation  #Hx of DVT  #Antiphospholipid antibody syndrome  CHADS2-VASC score is 2 (age, HTN). Well controlled at home. Switch from Metoprolol succinate 25 to Metoprolol tartrate 12.5mg BID for compatibility with G-tube 10/30. Will resume home regimen now okay for meds via G tube. Anticoagulation held  for operation, restarted 10/31 with plan for heparin bridge to warfarin (goal INR 2-3).  - Cardiology signed off  - Continue PTA flecainide 50 mg BID  - Continue PTA metoprolol succinate 25 mg qd  - Continue heparin gtt  - Continue warfarin, appreciate pharm dosing (goal INR 2-3)  - Daily INR  - Cardiac telemetry    #Dysphagia  In setting of Parkinson's disease. Per SLP review, most recent VFSS completed as an OP on 9/15/21 showed progressive, severe dysphagia with aspiration noted during and after the swallow. Pt was discharged from OP SLP due to limited expected progress.  - NPO     #Parkinson's  #L foot drop  Following with outpatient neurology, not on carbidopa-levodopa. Family interested in inpatient neurology second opinion.   - Plan for neurology consult 11/1     # BPH with urinary retention s/p TURP  # Bacteruria, asymptomatic   S/p TURP 8/9/21 for urinary retention. Post-operatively voided well with mild incontinence. UC 10/27 with ,000 Klebsiella, but in the absence of symptoms not treated. Most recently seen by urology 8/17/21 without acute concerns with plans to follow up in 3 months.   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 Follows closely with BMT     #Hx of Type II DM  Last A1C 5.5, diet controlled.       Diet: NPO per Anesthesia Guidelines for Procedure/Surgery Except for: Meds  Adult Formula Drip Feeding: Continuous Osmolite 1.5; Gastrostomy; Goal Rate: 15; mL/hr; Medication - Feeding Tube Flush Frequency: At least 15-30 mL water before and after medication administration and with tube clogging; Amount to Send (Nutrition...    DVT Prophylaxis: Warfarin, heparin gtt bridge  Roberson Catheter: Not present  Central Lines: None  Code Status: Full Code      Disposition Plan   Expected  discharge: 11/02/2021 1-2 days recommended to TBD once adequate pain management/ tolerating PO medications, antibiotic plan established and bowel function restored and patent g-tube..     The patient's care was discussed with the Attending Physician, Dr. Jacob.    Javi Leon MD  [facility] 3 Service  [facility]  Securely message with the Vocera Web Console (learn more here)  Text page via [employer] Paging/Directory    Please see sign in/sign out for up to date coverage information    Clinically Significant Risk Factors Present on Admission           # Severe Malnutrition, POA: based on Reduced intake;Subcutaneous fat loss;Muscle loss (10/31/21 1300)     ______________________________________________________________________    Interval History   No acute events overnight. Roberson has been leaking but feels this may be positional. Hesitant initially to remove roberson due to reduced mobility and difficulty with urinating while lying flat in bed but upon further discussion agreeable to trial with external catheter. Wishes to trial ice chips for comfort. Daughter at bedside upset by prior enteric G tube placement. Wishes to speak with patient relations. Worried her father's pain has not been adequately controlled.     Data reviewed today: I reviewed all medications, new labs and imaging results over the last 24 hours. I personally reviewed.    Physical Exam   Vital Signs: Temp: 97.9  F (36.6  C) Temp src: Oral BP: (!) 147/75 Pulse: 75   Resp: 16 SpO2: 96 % O2 Device: None (Room air) Oxygen Delivery: 2 LPM  Weight: 151 lbs 0 oz  Gen: lying in bed, in NAD  CV: regular rate, well perfused  Pulm: no increased WOB on RA  GI: soft, ND, tender to palpation around G tube site. G tube to gravity. Dressing in place c/d/i.  : Roberson in place draining yellow urine  Neuro: alert, conversant, responds to questions appropriately  Ext: no bilateral peripheral extremity edema, warm  Psych:  anxious    Data   Recent Labs   Lab 10/31/21  1150 10/31/21  0759 10/31/21  0715 10/31/21  0405 10/30/21  1902 10/30/21  1418 10/30/21  0844 10/30/21  0638 10/30/21  0636 10/29/21  2258 10/29/21  2258 10/29/21  1705 10/29/21  1259 10/29/21  1259   0000   WBC  --   --  12.8*  --   --   --   --   --  16.3*  --  15.0*   < >  --  14.3*  --    HGB  --   --  13.9  --   --   --   --   --  14.5  --  14.8   < >  --  15.1  --    MCV  --   --  94  --   --   --   --   --  92  --  90   < >  --  91  --    PLT  --   --  83*  --   --   --   --   --  95*  --  127*   < >  --  120*  --    INR  --   --  1.48*  --   --  1.32*  --  1.32*  --    < > 1.44*   < >  --   --   --    NA  --   --  136  --   --   --   --   --  136  --   --   --   --  134  --    POTASSIUM  --   --  4.0  --  3.9  --   --   --  3.1*  --   --   --    < > 3.5  --    CHLORIDE  --   --  100  --   --   --   --   --  98  --   --   --   --  93*  --    CO2  --   --  31  --   --   --   --   --  27  --   --   --   --  36*  --    BUN  --   --  35*  --   --   --   --   --  30  --   --   --   --  28  --    CR  --   --  1.34*  --   --   --   --   --  1.21  --   --   --   --  1.14  --    ANIONGAP  --   --  5  --   --   --   --   --  11  --   --   --   --  5  --    GILBERT  --   --  8.3*  --   --   --   --   --  8.6  --   --   --   --  9.2  --    GLC 96 92 95   < >  --   --    < >  --  140*  --   --   --    < > 145*   < >   ALBUMIN  --   --  2.1*  --   --   --   --   --  2.3*  --   --   --    < > 2.7*  --    PROTTOTAL  --   --  6.2*  --   --   --   --   --  6.3*  --   --   --    < > 7.4  --    BILITOTAL  --   --  1.1  --   --   --   --   --  0.7  --   --   --    < > 0.6  --    ALKPHOS  --   --  62  --   --   --   --   --  69  --   --   --    < > 86  --    ALT  --   --  7  --   --   --   --   --  9  --   --   --    < > 10  --    AST  --   --  18  --   --   --   --   --  16  --   --   --    < > 20  --     < > = values in this interval not displayed.     No results found for this or any  previous visit (from the past 24 hour(s)).  Medications     dextrose       heparin Stopped (10/31/21 1500)     - MEDICATION INSTRUCTIONS -       Warfarin Therapy Reminder         dutasteride  0.5 mg Oral Daily     flecainide  50 mg Oral Q12H [facility]     [START ON 11/1/2021] metoprolol succinate ER  25 mg Oral Daily     metoprolol tartrate  12.5 mg Per G Tube BID     multivitamin w/minerals  1 tablet Oral Daily     pantoprazole  40 mg Oral QAM AC     rosuvastatin  40 mg Oral At Bedtime     sodium chloride (PF)  3 mL Intracatheter Q8H     sodium fluoride dental gel   Dental Daily     vitamin D3  50 mcg Oral Daily     warfarin ANTICOAGULANT  2.5 mg Oral ONCE at 18:00

## 2021-10-31 NOTE — PLAN OF CARE
SLP: Bedside swallow eval orders received. Pt familiar to IP and OP SLP caseload. Most recent VFSS completed as an OP on 9/15/21 showed progressive, severe dysphagia with aspiration noted during and after the swallow. Pt was discharged from OP SLP due to limited expected progress. Discussed previous VFSS results, pillars of PNA, and options to eat/drink despite aspiration for comfort. Pt stated it was important for his QOL to eat and drink some PO. Educated pt on strategies to minimize risk of developing aspiration PNA such as diligent oral cares and frequent mobility. Pt verbalized good understanding. SLP encouraged pt to discuss overall goals of care with primary MD in regards to eating/drinking despite aspiration risk. Pt verbalized understanding; discussed with MD and RN. Will defer SLP consult and complete orders.

## 2021-10-31 NOTE — PROGRESS NOTES
"BP (!) 147/75 (BP Location: Right arm)   Pulse 75   Temp 97.9  F (36.6  C) (Oral)   Resp 16   Ht 1.804 m (5' 11.02\")   Wt 68.5 kg (151 lb)   SpO2 96%   BMI 21.05 kg/m      Shift: 3795-5441  VS: Hypertensive (130-140s/70-80s). OVSS on RA. Tele showed NSR.  Neuro: AOx4  BG: Daily (92)  Labs: Cr 1.34, wbc 12.8; heparin Xa < 0.1, 0.76  Respiratory: LS clear, diminished bilaterally  Pain/Nausea/PRN: Denies nausea. Abdominal pain managed w/ oxy x1; pt encouraged to communicate needs for pain relief  Diet: NPO  LDA: Dill out; Primofit in place, container marked. G tube w/ 40 ml green drainage. PIV x3 w/ TKO, heparin gtt paused @ 1500 per protocol   GI/: Adequate UO. No BM since surgery, not passing gas.  Skin: Mepilex in place over sacrum. Abdominal incision approximated w/ steri strips; lap sites x3  Mobility: Assist x2 w/ GB/walker. Ambulated halls x2 this shift; saw OT today  Plan: Restart heparin gtt @ 1600 per protocol; next heparin Xa to be drawn @ 2200. Initiate TF. Obtain PVR; monitor UO. SLP consulted today, see note.    Handoff given to following RN.    "

## 2021-10-31 NOTE — PROGRESS NOTES
General Surgery Progress Note  10/31/2021   ------------------------------------------------------------------------------------------------  Subjective:  Feels good this morning.  No nausea or vomiting. Some burping and hiccupping. Not passing gas, no BM. Dill in place, would prefer to keep in place. Needs assistance for ambulation.   ------------------------------------------------------------------------------------------------  Objective:  Temp:  [97.8  F (36.6  C)-98.3  F (36.8  C)] 97.8  F (36.6  C)  Pulse:  [74-86] 74  Resp:  [14-16] 16  BP: (130-135)/(75-81) 135/81  SpO2:  [96 %-97 %] 96 %    I/O last 3 completed shifts:  In: 560 [I.V.:500; NG/GT:60]  Out: 735 [Urine:725; Emesis/NG output:10]      Gen: Awake, interactive, NAD  Resp: breathing comfortably on room air  CV: regular rate, appears well perfused  Abd: soft, nondistended, appropriately tender to palpation, dressings in place, G tube to gravity   Incision: c/d/I  Ext: palpable pulses, no edema     Labs:  Recent Labs   Lab 10/30/21  0636 10/29/21  2800 10/29/21  1259   WBC 16.3* 15.0* 14.3*   HGB 14.5 14.8 15.1   PLT 95* 127* 120*       Recent Labs   Lab 10/31/21  0759 10/31/21  0715 10/31/21  0405 10/30/21  1902 10/30/21  1151 10/30/21  0844 10/30/21  0636 10/29/21  1259 10/29/21  1259   NA  --  136  --   --   --   --  136  --  134   POTASSIUM  --  4.0  --  3.9  --   --  3.1*   < > 3.5   CHLORIDE  --  100  --   --   --   --  98  --  93*   CO2  --  31  --   --   --   --  27  --  36*   BUN  --  35*  --   --   --   --  30  --  28   CR  --  1.34*  --   --   --   --  1.21  --  1.14   GLC 92 95 105*  --    < >   < > 140*   < > 145*   GILBERT  --  8.3*  --   --   --   --  8.6  --  9.2   MAG  --   --   --   --   --   --  1.4*  --   --     < > = values in this interval not displayed.       Imaging:  No new imaging     =======================================================  Assessment/Plan:   71 year old male with Parkinson's disease with dysphagia  and chronic constipation, afib (on coumadin), myeloproliferative disorder s/p BMT who presented with abdominal pain and distension after PEG placement by IR on 10/25/2021. On CT he was found to have distended stomach and small bowels suggestive of SBO with transition point at G tube which goes through small bowel. Now s/p diagnostic laparoscopy, small bowel resection, abdominal washout, insertion of G tube on 10/30. Awaiting return of bowel function.     Recommendations:  Okay to start trophic feeds today via G tube, do not advance today  Okay for meds via G tube  Remove roberson, okay to use bedside urinal or condom catheter  No abx needed from GI perspective. Continue abx for lung/bladder infection per primary team.  Okay for heparin ppx  SLP consult before advancing po diet, then okay for CLD   PT/OT    Continue excellent cares per primary team.       Seen with chief resident and staff surgeon.     Betzy Russo MD  PGY-1 General Surgery

## 2021-10-31 NOTE — PROGRESS NOTES
10/31/21 1540   Quick Adds   Type of Visit Initial Occupational Therapy Evaluation   Living Environment   People in home spouse   Current Living Arrangements house   Home Accessibility stairs to enter home;stairs within home   Number of Stairs, Within Home, Primary other (see comments)  (flight)   Transportation Anticipated family or friend will provide   Living Environment Comments Pt lives with his wife in a multi level home. Pt reports having a walk-in shower. Pt's son and daughter live within 5 miles and are around/able to assist prn.    Self-Care   Usual Activity Tolerance good   Current Activity Tolerance fair   Regular Exercise No   Equipment Currently Used at Home grab bar, toilet;grab bar, tub/shower;raised toilet seat;shower chair;walker, standard   Activity/Exercise/Self-Care Comment At baseline, pt is IND with all ADLs. Has a raised toilet seat, grab bars near toilet and shower, and a shower chair. Pt does have a FWW, but doesn't use regularly. Used the day before admission d/t weakness/fatigue.    Instrumental Activities of Daily Living (IADL)   Previous Responsibilities meal prep;housekeeping;laundry;shopping;yardwork;medication management;finances;driving   IADL Comments Pt is IND with all IADLs at baseline. Pt's wife has a bad back and unable to assist with heavier IADLs. Pt's children have been helping with heavier IADLs since surgery.    Disability/Function   Hearing Difficulty or Deaf no   Wear Glasses or Blind no   Concentrating, Remembering or Making Decisions Difficulty no   Difficulty Communicating no   Difficulty Eating/Swallowing no   Walking or Climbing Stairs Difficulty no   Dressing/Bathing Difficulty no   Toileting issues yes   Toileting Management raised toilet seat   Toileting Assistance toileting difficulty, requires equipment   Doing Errands Independently Difficulty (such as shopping) no   Fall history within last six months no   Change in Functional Status Since Onset of Current  Illness/Injury yes   General Information   Onset of Illness/Injury or Date of Surgery 10/29/21   Referring Physician Javi Leon MD   Patient/Family Therapy Goal Statement (OT) return home   Additional Occupational Profile Info/Pertinent History of Current Problem Haresh Rock is a 71 year old male with a history of atrial fibrillation (on coumadin), PE/DVT, pulmonary HTN, Parkinson's disease, myeloproliferative 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, cirrhosis with hereditary hemochromatosis, antiphospholipid antibody syndrome, type 2 diabetes mellitus (steroid induced), CKD stage 3, HTN, and HLD who presents with abdominal pain, episodes of vomiting, nausea, found to have a small bowel obstruction and g-tube placement complication who is now s/p g-tube correction and small bowel resection   Existing Precautions/Restrictions abdominal;fall   Left Upper Extremity (Weight-bearing Status) other (see comments)  (10#)   Right Upper Extremity (Weight-bearing Status) other (see comments)  (10#)   Left Lower Extremity (Weight-bearing Status) full weight-bearing (FWB)   Right Lower Extremity (Weight-bearing Status) full weight-bearing (FWB)   Cognitive Status Examination   Orientation Status orientation to person, place and time   Affect/Mental Status (Cognitive) WFL   Follows Commands WFL   Visual Perception   Visual Impairment/Limitations corrective lenses for distance   Sensory   Sensory Quick Adds No deficits were identified   Pain Assessment   Patient Currently in Pain No   Integumentary/Edema   Integumentary/Edema no deficits were identifed   Range of Motion Comprehensive   General Range of Motion bilateral upper extremity ROM WFL   Strength Comprehensive (MMT)   Comment, General Manual Muscle Testing (MMT) Assessment generalized weakness    Muscle Tone Assessment   Muscle Tone Quick Adds No deficits were identified   Coordination   Upper Extremity Coordination No deficits were identified   Fine Motor  Coordination Pt reporting FM difficulties, requesting assist to open toothpaste and toothbrush, but ultimately able to complete IND   Bed Mobility   Bed Mobility supine-sit;sit-supine   Supine-Sit [place] (Bed Mobility) verbal cues   Sit-Supine [place] (Bed Mobility) verbal cues   Assistive Device (Bed Mobility) bed rails   Comment (Bed Mobility) HOB raised, VC for log roll technique   Transfers   Transfers sit-stand transfer   Sit-Stand Transfer   Sit-Stand [place] (Transfers) minimum assist (75% patient effort)   Assistive Device (Sit-Stand Transfers) walker, front-wheeled   Sit/Stand Transfer Comments from EOB   Clinical Impression   Criteria for Skilled Therapeutic Interventions Met (OT) yes;meets criteria   OT Diagnosis decreased activity tolerance and weakness impacting ability to safely and independently compelte I/ADLs and functional mobility   OT Problem List-Impairments impacting ADL problems related to;activity tolerance impaired;post-surgical precautions   Assessment of Occupational Performance 1-3 Performance Deficits   Identified Performance Deficits dressing, toileting, home mgmt   Planned Therapy Interventions (OT) ADL retraining;IADL retraining;strengthening;transfer training;home program guidelines;progressive activity/exercise   Clinical Decision Making Complexity (OT) low complexity   Therapy Frequency (OT) Daily   Predicted Duration of Therapy 1-2 sessions   Anticipated Equipment Needs Upon Discharge (OT) other (see comments)  (TBD with further therapy)   Risk & Benefits of therapy have been explained evaluation/treatment results reviewed;care plan/treatment goals reviewed;risks/benefits reviewed;current/potential barriers reviewed;participants voiced agreement with care plan;patient;participants included   Comment-Clinical Impression decreased activity tolerance and weakness impacting ability to safely and independently compelte I/ADLs and functional mobility. Would benefit from  skilled therapy to progress activity tolerance and safety/independence with I/ADLs.    OT Discharge Planning    OT Discharge Recommendation (DC Rec) Home with assist   OT Brief overview of current status  Min A + GB + FWW to stand. CGA + FWW for ambulation   Total Evaluation Time (Minutes)   Total Evaluation Time (Minutes) 8

## 2021-10-31 NOTE — PROGRESS NOTES
CLINICAL NUTRITION SERVICES - ASSESSMENT NOTE     Nutrition Prescription    RECOMMENDATIONS FOR MDs/PROVIDERS TO ORDER:  SLP eval when pt can take PO.    Malnutrition Status:    Severe malnutrition in the context of acute-on-chronic illness.     Recommendations already ordered by Registered Dietitian (RD):  Notified outpt RD via email that pt is currently inpatient.    Wrote orders for fiber-free, concentrated regimen: Osmolite 1.5 (similar sub for Isosource 1.5 dee, but zero fiber given recent SBO) @ 15 mL/hr via G-tube for trophic TF trial. Goal is for 55 mL/hr continuous or 6 cartons per day given as intermittent or bolus feeds via gravity bag.    Future/Additional Recommendations:  Bolus/intermittent feed goal for home is Isosource 1.5 dee, 2 cartons formula TID spread 3-4 hours apart        REASON FOR ASSESSMENT  Haresh Rock is a 71 year old male assessed by the dietitian for Provider Order - Registered Dietitian to Assess and Order TF per Medical Nutrition Therapy Protocol, trophic TF to start    NUTRITION HISTORY  Discussed with pt plan to start slow TF today, physical exam completed but then pt needed help using urinal. When writer returned to attempt longer interview, pt was busy with another provider (presume SLP per their discussion).    PEG was placed on 10/25. Pt started TF on 10/28. He was supposed to f/u with outpt RD on 11/1 but is now inpatient.    Per Outpt RD note from 10/26, G-tube TF goals were:    Formula: Isosource 1.5 dee  Volume: 6 cartons/day (1500 mL, 1140 mL water)  Provisions: 2250 kcal (32 kcal/kg), 102 g protein (1.4 g/kg), 264 g CHO, 22g fiber daily     Suggested Tube feeding schedule via gravity bag feedings  Day 1: 1 carton formula TID spread 3-4 hours apart   Day 2: 1 1/2 cartons formula TID spread 3-4 hours apart   Day 3: 2 cartons formula TID spread 3-4 hours apart    Flush with 30-60mL water before and after each feeding  Flush with additional 120 mL water QID to meet 100%  "hydration      Pt has h/o dysphagia r/t Parkinson's dz and has anorexia.    Per ED MD note \"CT of the abdomen showed a distal small bowel obstruction with suspected transenteric course of the G-tube through the small bowel.\" G-tube was put to gravity on 10/29 with improvement in pt's nausea and abd pain. Pt was taken to OR on 10/30 for a laparoscopic takedown of a malpositioned gastrostomy tube with insertion of a new tube, repair of small bowel enterotomy and an abd washout.     CURRENT NUTRITION ORDERS  Diet: NPO  Intake/Tolerance: had nausea after PO at home on 10/26    LABS  Labs reviewed- lytes are WNL today    MEDICATIONS  Medications reviewed- includes vit D3    ANTHROPOMETRICS  Height: 180.4 cm (5' 11.024\")  Most Recent Weight: 68.5 kg (151 lb)    IBW: 78 kg  BMI: Normal BMI  Weight History:   Wt Readings from Last 10 Encounters:   10/29/21 68.5 kg (151 lb)   10/25/21 64.4 kg (142 lb)   10/22/21 66.2 kg (146 lb)   09/13/21 64 kg (141 lb)   08/30/21 65.7 kg (144 lb 12.8 oz)   08/19/21 68 kg (150 lb)   08/17/21 69.9 kg (154 lb)   08/04/21 69.9 kg (154 lb 3.2 oz)   08/03/21 68.8 kg (151 lb 9.6 oz)   07/27/21 68 kg (150 lb)       Dosing Weight: 69 kg (current wt)    ASSESSED NUTRITION NEEDS  Estimated Energy Needs: 2100-2394+ kcals/day (25 - 30+ kcals/kg)  Justification: Maintenance  Estimated Protein Needs:  g/day (1.2 - 1.5 g/kg)  Justification: Hypercatabolism with acute illness  Estimated Fluid Needs: 3370-9662 mL/day (25 - 30 mL/kg)   Justification: Maintenance    PHYSICAL FINDINGS  See malnutrition section below.  Cachexia  Poor skin turgor   Sacrum w/ balncahable redness    MALNUTRITION  % Intake: < 75% for > 7 days  % Weight Loss: None noted  Subcutaneous Fat Loss: Facial region, Upper arm and Lower arm: moderate  Muscle Loss: Facial & jaw region: moderate, Scapular bone: severe, Thoracic region (clavicle, acromium bone, deltoid, trapezius, pectoral): severe, Upper arm (bicep, tricep): severe, " Lower arm (forearm): moderate, Dorsal hand: moderate, Upper leg (quadricep, hamstring): severe, Patellar region: moderate and Posterior calf: severe  Fluid Accumulation/Edema: None noted  Malnutrition Diagnosis: Severe malnutrition in the context of acute-on-chronic illness.     NUTRITION DIAGNOSIS  Inadequate protein-energy intake related to nausea, abd pain, malpositioned G-tube with SBO as evidenced by pt with inadequate TF intake x 2 days, muscle and fat wasting c/w severe malnutrition.       INTERVENTIONS  Implementation  Nutrition Education: See education flowsheet   Collaboration with other nutrition professionals- email to outpt RD with visit scheduled for 11/1  Enteral Nutrition - Initiate trophic feeds    Goals  Total avg nutritional intake (TF +/- PO intake) to meet a minimum of 25 kcal/kg and 1.2 g PRO/kg daily (per dosing wt 69 kg).     Monitoring/Evaluation  Progress toward goals will be monitored and evaluated per protocol.    Rachael Rhoades, RD, LD

## 2021-10-31 NOTE — PROGRESS NOTES
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    #Abdominal Pain  #Small bowel obstruction  #trans-enteric course of the G tube through small bowel  #S/P g-tube correction and small bowel resection  Mr. Rock's presentation of abdominal pain, vomiting, and imaging is consistent with an SBO caused by his G-tube with a trans-enteric path. His relief of symptoms with turning his g-tube to a gravity bag further evidence of an obstructive etiology. Doing clinically well s/p repair.  - Surgery following  - Continue NPO and hold tube feeds until ROBF   -likely will need SLP consult before advancing diet   -If he starts passing gas today okay to start trickle tube feeds through G tube.    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  #Hx of GVHD  - Since 2012, 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  - Follows closely with BMT     Hx of Type II DM  - Last A1C 5.5  - Diet controlled     #History of Parkinson's  #L foot drop  - Following with outpatient neurology   - Not on carbidopa-levodopa     # Hematuria  # Pyuria/Bacteruria  - s/p TURP   - Saw Urology 8/17       Diet: NPO per Anesthesia Guidelines for Procedure/Surgery Except for: Meds    DVT Prophylaxis: Warfarin, LI Heparin IV while NPO  Roberson Catheter: PRESENT, indication: /GI/GYN Pelvic Procedure  Central Lines: None  Code Status: Full Code      Disposition Plan   Expected discharge:  1-2 days recommended to prior living arrangement once adequate pain management/ tolerating PO medications, antibiotic plan established and bowel function restored and patent g-tube..     The patient's care was discussed with the Attending Physician, Dr. Jacob.    MD Drea Horton[address]  Securely message with the Vocera Web Console (learn more here)  Text page via [employer] Paging/Directory    Please see sign in/sign out for up to date coverage information    Clinically Significant Risk Factors Present on Admission               ______________________________________________________________________    Interval History   Patient had surgery overnight that he tolerated well. Feels tired and deconditioned and would like to keep roberson in place otherwise he is worried about making it to the bathroom in time to void. No fevers, chills, or inadequate pain control. Worried about his AFib and wanting to restart his antiarrythmic medication.     Data reviewed today:  I reviewed all medications, new labs and imaging results over the last 24 hours. I personally reviewed.    Physical Exam   Vital Signs: Temp: 97.8  F (36.6  C) Temp src: Oral BP: 130/77 Pulse: 82   Resp: 14 SpO2: 97 % O2 Device: Nasal cannula Oxygen Delivery: 2 LPM  Weight: 151 lbs 0 oz  Constitutional: awake, tired appearing, cooperative, no apparent distress, and appears stated age  Eyes: Lids and lashes normal, pupils equal, extra ocular muscles intact, sclera clear, conjunctiva normal  ENT: Normocephalic, without obvious abnormality, atraumatic  Respiratory: No increased work of breathing, good air exchange, clear to auscultation bilaterally, no crackles or wheezing  Cardiovascular: regular rate and rhythm, normal S1 and S2, no S3 or S4, and no murmur noted  GI: soft, non-distended, non-tender, no masses palpated, no hepatosplenomegaly, G-tube surgical site and laparoscopic surgical sites c/d/i and without erythema  Skin: no bruising or bleeding, no redness, warmth, or swelling and no jaundice  Neurologic: Awake, alert, oriented to name, place and time. No focal deficits.  Neuropsychiatric: General: normal, calm and normal eye contact. Level of consciousness: alert / normal. Affect: normal, Memory and insight: normal, memory for past and recent events intact and thought process normal    Data   Recent Labs   Lab 10/30/21  1418 10/30/21  1151 10/30/21  0844 10/30/21  0638 10/30/21  0636 10/29/21  2258 10/29/21  1705 10/29/21  1259 10/29/21  1259 10/27/21  1258 10/27/21  1257   WBC  --   --   --   --  16.3* 15.0*  --   --  14.3*   < >  --    HGB  --   --   --   --  14.5 14.8  --   --  15.1   < >  --    MCV  --   --   --   --  92 90  --   --  91   < >  --    PLT  --   --   --   --  95* 127*  --   --  120*   < >  --    INR 1.32*  --   --  1.32*  --  1.44*   < >  --   --    < >  --    NA  --   --   --   --  136  --   --   --  134  --  132*   POTASSIUM  --   --   --   --  3.1*  --   --   --  3.5  --  3.5   CHLORIDE   --   --   --   --  98  --   --   --  93*  --  94   CO2  --   --   --   --  27  --   --   --  36*  --  33*   BUN  --   --   --   --  30  --   --   --  28  --  24   CR  --   --   --   --  1.21  --   --   --  1.14  --  1.02   ANIONGAP  --   --   --   --  11  --   --   --  5  --  5   GILBERT  --   --   --   --  8.6  --   --   --  9.2  --  9.9   GLC  --  150* 132*  --  140*  --   --    < > 145*   < > 170*   ALBUMIN  --   --   --   --  2.3*  --   --   --  2.7*   < > 3.3*   PROTTOTAL  --   --   --   --  6.3*  --   --   --  7.4   < > 7.8   BILITOTAL  --   --   --   --  0.7  --   --   --  0.6   < > 0.8   ALKPHOS  --   --   --   --  69  --   --   --  86   < > 104   ALT  --   --   --   --  9  --   --   --  10   < > 11   AST  --   --   --   --  16  --   --   --  20   < > 20    < > = values in this interval not displayed.     No results found for this or any previous visit (from the past 24 hour(s)).  Medications     [START ON 10/31/2021] heparin       - MEDICATION INSTRUCTIONS -       Patient RECEIVING antibiotic to treat a different condition and it provides ADEQUATE COVERAGE for this surgical procedure.       Warfarin Therapy Reminder         dutasteride  0.5 mg Oral Daily     flecainide  50 mg Oral Q12H JEAN-CLAUDE     magnesium sulfate  4 g Intravenous Once     [Held by provider] metoprolol succinate ER  25 mg Oral Daily     metoprolol tartrate  12.5 mg Per G Tube BID     multivitamin w/minerals  1 tablet Oral Daily     [START ON 10/31/2021] pantoprazole  40 mg Oral QAM AC     piperacillin-tazobactam  4.5 g Intravenous Q6H     potassium chloride  10 mEq Intravenous Q1H     [Held by provider] rosuvastatin  40 mg Oral At Bedtime     [Held by provider] sodium chloride (PF)  3 mL Intracatheter Q8H     [Held by provider] sodium fluoride dental gel   Dental Daily     [Held by provider] vitamin D3  50 mcg Oral Daily     [START ON 10/31/2021] warfarin ANTICOAGULANT  5 mg Oral Once

## 2021-11-01 ENCOUNTER — APPOINTMENT (OUTPATIENT)
Dept: OCCUPATIONAL THERAPY | Facility: CLINIC | Age: 71
DRG: 326 | End: 2021-11-01
Payer: COMMERCIAL

## 2021-11-01 ENCOUNTER — APPOINTMENT (OUTPATIENT)
Dept: PHYSICAL THERAPY | Facility: CLINIC | Age: 71
DRG: 326 | End: 2021-11-01
Payer: COMMERCIAL

## 2021-11-01 LAB
ANION GAP SERPL CALCULATED.3IONS-SCNC: 2 MMOL/L (ref 3–14)
BUN SERPL-MCNC: 34 MG/DL (ref 7–30)
CALCIUM SERPL-MCNC: 8 MG/DL (ref 8.5–10.1)
CHLORIDE BLD-SCNC: 99 MMOL/L (ref 94–109)
CO2 SERPL-SCNC: 33 MMOL/L (ref 20–32)
CREAT SERPL-MCNC: 1.31 MG/DL (ref 0.66–1.25)
CRP SERPL-MCNC: 180 MG/L (ref 0–8)
ERYTHROCYTE [DISTWIDTH] IN BLOOD BY AUTOMATED COUNT: 13.2 % (ref 10–15)
GFR SERPL CREATININE-BSD FRML MDRD: 54 ML/MIN/1.73M2
GLUCOSE BLD-MCNC: 124 MG/DL (ref 70–99)
GLUCOSE BLDC GLUCOMTR-MCNC: 134 MG/DL (ref 70–99)
HCT VFR BLD AUTO: 38.1 % (ref 40–53)
HGB BLD-MCNC: 13 G/DL (ref 13.3–17.7)
HOLD SPECIMEN: NORMAL
INR PPP: 1.53 (ref 0.85–1.15)
MAGNESIUM SERPL-MCNC: 2.2 MG/DL (ref 1.6–2.3)
MCH RBC QN AUTO: 32.1 PG (ref 26.5–33)
MCHC RBC AUTO-ENTMCNC: 34.1 G/DL (ref 31.5–36.5)
MCV RBC AUTO: 94 FL (ref 78–100)
PHOSPHATE SERPL-MCNC: 1.9 MG/DL (ref 2.5–4.5)
PHOSPHATE SERPL-MCNC: 2 MG/DL (ref 2.5–4.5)
PHOSPHATE SERPL-MCNC: 2.2 MG/DL (ref 2.5–4.5)
PLATELET # BLD AUTO: 99 10E3/UL (ref 150–450)
POTASSIUM BLD-SCNC: 3.8 MMOL/L (ref 3.4–5.3)
PROCALCITONIN SERPL-MCNC: 1.96 NG/ML
RBC # BLD AUTO: 4.05 10E6/UL (ref 4.4–5.9)
SODIUM SERPL-SCNC: 134 MMOL/L (ref 133–144)
UFH PPP CHRO-ACNC: 0.26 IU/ML
UFH PPP CHRO-ACNC: 0.3 IU/ML
WBC # BLD AUTO: 10.7 10E3/UL (ref 4–11)

## 2021-11-01 PROCEDURE — 99232 SBSQ HOSP IP/OBS MODERATE 35: CPT | Mod: GC | Performed by: HOSPITALIST

## 2021-11-01 PROCEDURE — 250N000009 HC RX 250

## 2021-11-01 PROCEDURE — 84145 PROCALCITONIN (PCT): CPT | Performed by: STUDENT IN AN ORGANIZED HEALTH CARE EDUCATION/TRAINING PROGRAM

## 2021-11-01 PROCEDURE — 86140 C-REACTIVE PROTEIN: CPT | Performed by: STUDENT IN AN ORGANIZED HEALTH CARE EDUCATION/TRAINING PROGRAM

## 2021-11-01 PROCEDURE — 36415 COLL VENOUS BLD VENIPUNCTURE: CPT

## 2021-11-01 PROCEDURE — 250N000013 HC RX MED GY IP 250 OP 250 PS 637

## 2021-11-01 PROCEDURE — 99221 1ST HOSP IP/OBS SF/LOW 40: CPT | Mod: GC | Performed by: PSYCHIATRY & NEUROLOGY

## 2021-11-01 PROCEDURE — 36415 COLL VENOUS BLD VENIPUNCTURE: CPT | Performed by: STUDENT IN AN ORGANIZED HEALTH CARE EDUCATION/TRAINING PROGRAM

## 2021-11-01 PROCEDURE — 97530 THERAPEUTIC ACTIVITIES: CPT | Mod: GP | Performed by: PHYSICAL THERAPIST

## 2021-11-01 PROCEDURE — 84100 ASSAY OF PHOSPHORUS: CPT

## 2021-11-01 PROCEDURE — 250N000013 HC RX MED GY IP 250 OP 250 PS 637: Performed by: STUDENT IN AN ORGANIZED HEALTH CARE EDUCATION/TRAINING PROGRAM

## 2021-11-01 PROCEDURE — 97161 PT EVAL LOW COMPLEX 20 MIN: CPT | Mod: GP | Performed by: PHYSICAL THERAPIST

## 2021-11-01 PROCEDURE — 99207 PR CDG-MDM COMPONENT: MEETS MODERATE - DOWN CODED: CPT | Performed by: HOSPITALIST

## 2021-11-01 PROCEDURE — 97535 SELF CARE MNGMENT TRAINING: CPT | Mod: GO

## 2021-11-01 PROCEDURE — 97116 GAIT TRAINING THERAPY: CPT | Mod: GP | Performed by: PHYSICAL THERAPIST

## 2021-11-01 PROCEDURE — 85520 HEPARIN ASSAY: CPT | Performed by: STUDENT IN AN ORGANIZED HEALTH CARE EDUCATION/TRAINING PROGRAM

## 2021-11-01 PROCEDURE — 258N000003 HC RX IP 258 OP 636

## 2021-11-01 PROCEDURE — G0463 HOSPITAL OUTPT CLINIC VISIT: HCPCS

## 2021-11-01 PROCEDURE — 120N000011 HC R&B TRANSPLANT UMMC

## 2021-11-01 PROCEDURE — 85027 COMPLETE CBC AUTOMATED: CPT

## 2021-11-01 PROCEDURE — 85610 PROTHROMBIN TIME: CPT | Performed by: STUDENT IN AN ORGANIZED HEALTH CARE EDUCATION/TRAINING PROGRAM

## 2021-11-01 PROCEDURE — 80048 BASIC METABOLIC PNL TOTAL CA: CPT | Performed by: STUDENT IN AN ORGANIZED HEALTH CARE EDUCATION/TRAINING PROGRAM

## 2021-11-01 PROCEDURE — 250N000013 HC RX MED GY IP 250 OP 250 PS 637: Performed by: HOSPITALIST

## 2021-11-01 PROCEDURE — 83735 ASSAY OF MAGNESIUM: CPT | Performed by: STUDENT IN AN ORGANIZED HEALTH CARE EDUCATION/TRAINING PROGRAM

## 2021-11-01 PROCEDURE — 84100 ASSAY OF PHOSPHORUS: CPT | Performed by: STUDENT IN AN ORGANIZED HEALTH CARE EDUCATION/TRAINING PROGRAM

## 2021-11-01 RX ORDER — GUAIFENESIN 600 MG/1
15 TABLET, EXTENDED RELEASE ORAL DAILY
Status: DISCONTINUED | OUTPATIENT
Start: 2021-11-02 | End: 2021-11-04 | Stop reason: HOSPADM

## 2021-11-01 RX ORDER — FLECAINIDE ACETATE 50 MG/1
50 TABLET ORAL EVERY 12 HOURS SCHEDULED
Status: DISCONTINUED | OUTPATIENT
Start: 2021-11-01 | End: 2021-11-04 | Stop reason: HOSPADM

## 2021-11-01 RX ORDER — ROSUVASTATIN CALCIUM 40 MG/1
40 TABLET, COATED ORAL AT BEDTIME
Status: DISCONTINUED | OUTPATIENT
Start: 2021-11-01 | End: 2021-11-04 | Stop reason: HOSPADM

## 2021-11-01 RX ORDER — ACETAMINOPHEN 325 MG/10.15ML
650 LIQUID ORAL EVERY 6 HOURS
Status: DISCONTINUED | OUTPATIENT
Start: 2021-11-01 | End: 2021-11-04 | Stop reason: HOSPADM

## 2021-11-01 RX ORDER — WARFARIN SODIUM 2.5 MG/1
2.5 TABLET ORAL
Status: COMPLETED | OUTPATIENT
Start: 2021-11-01 | End: 2021-11-01

## 2021-11-01 RX ORDER — ACETAMINOPHEN 325 MG/10.15ML
650 LIQUID ORAL EVERY 6 HOURS
Status: DISCONTINUED | OUTPATIENT
Start: 2021-11-01 | End: 2021-11-01

## 2021-11-01 RX ORDER — OXYCODONE HCL 5 MG/5 ML
5 SOLUTION, ORAL ORAL EVERY 4 HOURS PRN
Status: DISCONTINUED | OUTPATIENT
Start: 2021-11-01 | End: 2021-11-04 | Stop reason: HOSPADM

## 2021-11-01 RX ADMIN — FLECAINIDE ACETATE 50 MG: 50 TABLET ORAL at 09:18

## 2021-11-01 RX ADMIN — Medication 40 MG: at 09:18

## 2021-11-01 RX ADMIN — SODIUM FLUORIDE: 5 GEL DENTAL at 21:08

## 2021-11-01 RX ADMIN — ROSUVASTATIN 40 MG: 10 TABLET, FILM COATED ORAL at 22:45

## 2021-11-01 RX ADMIN — OXYCODONE HYDROCHLORIDE 5 MG: 5 SOLUTION ORAL at 05:47

## 2021-11-01 RX ADMIN — ACETAMINOPHEN 650 MG: 325 SOLUTION ORAL at 18:50

## 2021-11-01 RX ADMIN — Medication 50 MCG: at 09:18

## 2021-11-01 RX ADMIN — ACETAMINOPHEN 650 MG: 325 SOLUTION ORAL at 12:17

## 2021-11-01 RX ADMIN — Medication 12.5 MG: at 09:18

## 2021-11-01 RX ADMIN — FLECAINIDE ACETATE 50 MG: 50 TABLET ORAL at 19:48

## 2021-11-01 RX ADMIN — POTASSIUM PHOSPHATE, MONOBASIC POTASSIUM PHOSPHATE, DIBASIC 9 MMOL: 224; 236 INJECTION, SOLUTION, CONCENTRATE INTRAVENOUS at 21:35

## 2021-11-01 RX ADMIN — WARFARIN SODIUM 2.5 MG: 2.5 TABLET ORAL at 18:50

## 2021-11-01 RX ADMIN — Medication 1 TABLET: at 09:18

## 2021-11-01 RX ADMIN — Medication 12.5 MG: at 19:48

## 2021-11-01 ASSESSMENT — ACTIVITIES OF DAILY LIVING (ADL)
ADLS_ACUITY_SCORE: 13
ADLS_ACUITY_SCORE: 14
ADLS_ACUITY_SCORE: 14
ADLS_ACUITY_SCORE: 13
DEPENDENT_IADLS:: INDEPENDENT
ADLS_ACUITY_SCORE: 14
ADLS_ACUITY_SCORE: 14
ADLS_ACUITY_SCORE: 13
ADLS_ACUITY_SCORE: 14
ADLS_ACUITY_SCORE: 13
ADLS_ACUITY_SCORE: 14
ADLS_ACUITY_SCORE: 13
ADLS_ACUITY_SCORE: 13
ADLS_ACUITY_SCORE: 14
ADLS_ACUITY_SCORE: 14
ADLS_ACUITY_SCORE: 13
ADLS_ACUITY_SCORE: 14

## 2021-11-01 ASSESSMENT — MIFFLIN-ST. JEOR: SCORE: 1398.01

## 2021-11-01 NOTE — CONSULTS
[facility]  Neurology Consultation    Patient Name:  Haresh Rock  MRN:  [id number]    :  1950  Date of Service:  2021  Primary care provider:  Anya Nowak      Neurology consultation service was asked to see Haresh Rock by Dr. Marie to evaluate for a diagnosis of parkinson disease.    History of Present Illness:   71 year old medically complex male PMH Parkinson Disease who presents with SBO 2/2 PEG complication.     2 years PTA Haresh developed dysphagia, weight loss and bradykinesia. He was seen by speech pathology who confirmed aspiration and referred him to neurology. He was first seen by Dr. Hill in 2020, and he was concerned for parkinsonism. Dopamine scan 11/3/2020 demonstrated a bilateral presynaptic dopaminergic deficit. Last seen on 10/8/2021 and discussed starting patient on carbidopa/levodopa 25/100 but has not yet been started due to progressive swallowing difficulties and plan for G-tube placement.     Patient was admitted for SBO following GT misplacement. Family requesting neurology consult for second opinion of Parkinson disease. Family feels his decline has been very rapid and are curious about multisystem atrophy. Per patient his family is not questioning the diagnosis but they are curious about his timeline. Patient reports worsening swallowing and bradykinesia over the past year as well as the development of macrographia and bilateral hand tremor at rest.        ROS  A 5-point ROS was performed as per HPI. Negative other than what is noted in HPI.    PMH  Past Medical History:   Diagnosis Date     Abnormal dopamine scan (DaTSCAN) 2020    [phone] 11/3/2020   Narrative & Impression   Examination: Nuclear medicine DATscan for Dopamine Receptor Localization.   Examination: NM Brain Image Tomographic (SPECT) DATscan   Date: 11/3/2020 3:21 PM   Indication: Parkinsonism   Comparison: None    Additional Information: none   Interfering Medications: None   Technique:   The patient initially received 1 ml of Lugol's solution orally prior     Antiphospholipid antibody syndrome (H) 2005    Ravi's     Antiphospholipid antibody syndrome (H)     Ravi's, noted negative per testing re-done in 2008 in hematology note 01/13/2009     Articulation disorder 09/23/2020     Atrial fibrillation (H) 04/2011     Benign prostatic hyperplasia with urinary retention      Cirrhosis (H)      CKD (chronic kidney disease) stage 2, GFR 60-89 ml/min      Diabetes mellitus type II     steroid induced     DJD (degenerative joint disease) of knee     SHAWN, worse on right     DVT (deep venous thrombosis) (H)      ED (erectile dysfunction)      Gallbladder polyp 05/2010     Lqmql-Rtbjpg-Rlfs Disease 2007    BMT     Hemochromatosis      Hodgkin's disease NOS     5 cycles of ABVD in 2011     HTN (hypertension)      Hyperlipidaemia LDL goal <100      Multiple thyroid nodules     benign      Myeloproliferative disorder (H)     atypical, [facility]     Parkinson disease (H) 09/24/2020     PE (pulmonary embolism) 11/2004     Polyneuropathy      Primary pulmonary hypertension (H)      Thrombocytopenia (H) 06/08/2021     Past Surgical History:   Procedure Laterality Date     ANESTHESIA CARDIOVERSION  04/21/2011    Procedure:ANESTHESIA CARDIOVERSION; Surgeon:GENERIC ANESTHESIA PROVIDER; Location:[facility] OR     ARTHROSCOPY KNEE RT/LT      LT     CATARACT IOL, RT/LT  2017     COLONOSCOPY  10/16/2012    Procedure: COLONOSCOPY;  Colonoscopy, screening;  Surgeon: Reg Feliciano MD;  Location:  OR     COLONOSCOPY N/A 04/14/2021    Procedure: COLONOSCOPY;  Surgeon: Reg Holm MD;  [facility]: [facility]     ESOPHAGOSCOPY, GASTROSCOPY, DUODENOSCOPY (EGD), COMBINED N/A 10/07/2020    Procedure: ESOPHAGOGASTRODUODENOSCOPY, WITH BIOPSY;  Surgeon: Raul Sawyer DO;  [place] [facility] [place] [place]     H ABLATION FOCAL AFIB  03/05/2013    PVI     H ABLATION FOCAL AFIB   01/01/2018      BONE MARROW/STEM TRANSPLANT ALLOGENIC  05/08/2007     INSERT PORT VASCULAR ACCESS       JOINT REPLACEMTN, KNEE RT/LT  03/28/2012    SHAWN     LAPAROSCOPY DIAGNOSTIC (GENERAL) N/A 10/29/2021    Procedure: LAPAROSCOPY, DIAGNOSTIC, TAKEDOWN OF MALPOSITIONED GASTROSTOMY TUBE, INSERTION OF GASTROSTOMY TUBE, SMALL BOWEL RESECTION X1, PRIMARY REPAIR OF SMALL BOWEL ENTEROTOMY, ABDOMINAL WASHOUT, TAP BLOCK;  Surgeon: Leif Finney DO;  Location: [facility] OR     PEG TUBE PLACEMENT  10/25/2021     VASECTOMY  1990       Medications   Facility-Administered Medications Prior to Admission   Medication Dose Route Frequency Provider Last Rate Last Admin     ondansetron (ZOFRAN) injection 4 mg  4 mg Intravenous Q6H PRN Renato West MD   4 mg at 10/27/21 1426     Medications Prior to Admission   Medication Sig Dispense Refill Last Dose     acetaminophen (TYLENOL) 500 MG tablet 500mg tab as needed   10/26/2021 at prn     calcium carbonate (TUMS) 500 MG chewable tablet Take 1 chew tab by mouth daily as needed for heartburn   10/29/2021 at prn     carbidopa-levodopa (SINEMET)  MG tablet Not taking 90 tablet 11 Unknown at Unknown time     dutasteride (AVODART) 0.5 MG capsule Take 1 capsule (0.5 mg) by mouth daily 90 capsule 3 10/28/2021 at Unknown time     flecainide (TAMBOCOR) 50 MG tablet TAKE 1 TABLET BY MOUTH TWICE A  tablet 0 10/28/2021 at am     fluticasone (FLONASE) 50 MCG/ACT nasal spray Spray 1 spray into both nostrils daily as needed for rhinitis 16 g 11 Past Week at prn     levofloxacin (LEVAQUIN) 500 MG tablet Take 1 tablet (500 mg) by mouth daily for 5 days 5 tablet 0 10/29/2021 at am     loratadine (CLARITIN) 10 MG tablet Take 10 mg by mouth daily as needed    10/28/2021 at prn     metoprolol succinate ER (TOPROL XL) 25 MG 24 hr tablet Take 1 tablet (25 mg) by mouth daily In the evening 90 tablet 3 10/28/2021 at pm     multivitamin (CENTRUM SILVER) tablet Take 1 tablet by mouth  "daily    10/28/2021 at pm     omeprazole (PRILOSEC) 20 MG DR capsule Take 1 capsule (20 mg) by mouth every morning 30 capsule 0 10/29/2021 at am     ondansetron (ZOFRAN-ODT) 4 MG ODT tab Take 1 tablet (4 mg) by mouth every 6 hours as needed for nausea 30 tablet 0 10/29/2021 at prn     Pseudoeph-Doxylamine-DM-APAP (NYQUIL PO) As needed   Past Week at prn     rosuvastatin (CRESTOR) 40 MG tablet Take 1 tablet (40 mg) by mouth At Bedtime 90 tablet 3 10/28/2021 at pm     senna-docusate (SENOKOT-S/PERICOLACE) 8.6-50 MG tablet Take 2 tablets by mouth daily as needed for constipation   Past Month at prn     Starch-Maltodextrin (THICK-IT PO)    Unknown at Unknown time     vitamin D3 (CHOLECALCIFEROL) 50 mcg (2000 units) tablet Take 1 tablet (50 mcg) by mouth daily   10/28/2021 at am     warfarin ANTICOAGULANT (COUMADIN) 2.5 MG tablet TAKE 3.75 MG EVERY FRIDAY AND 2.5 MG ALL OTHER DAYS OR AS DIRECTED BY ACC. 90 tablet 3 10/28/2021 at pm     PREVIDENT 5000 DRY MOUTH 1.1 % GEL topical gel Take by mouth daily  3        Allergies  Allergies   Allergen Reactions     Seasonal Allergies        Social History  Lives at home with his wife.    Family History    I have reviewed this patient's family history      Physical Examination   Vitals: /66 (BP Location: Right arm)   Pulse 80   Temp 97.6  F (36.4  C) (Oral)   Resp 18   Ht 1.804 m (5' 11.02\")   Wt 63 kg (139 lb)   SpO2 97%   BMI 19.37 kg/m      General: Adult patient, lying in bed, NAD. Masked facies/hypomimia, cachecitc  HEENT: NC/AT, no icterus, op pink and tachy. Decreased eye blinking  Cardiac: RRR  Chest: non-labored on RA  Abdomen: Soft, scaphoid, slightly tender near incisions, GT c/d/i  Extremities: Warm, no edema  Skin: Scattered ecchymoses   Psych: Mood pleasant, affect congruent  Neuro:  Mental status: Awake, alert, attentive, oriented to self, time, place, and circumstance. Language is fluent, hyperkinetic, hypophonic with some delayed answering. Coherent " with intact comprehension of complex commands, naming and repetition.  Cranial nerves: PERRL, conjugate gaze, EOMI, facial sensation intact, face symmetric, shoulder shrug strong, tongue/uvula midline, no dysarthria.   Motor: Diffuse atrophy, increased tone. No abnormal movements, no tremor noted. 5/5 strength in 4/4 extremities. Mild rigidity notable with extension. Deteriorating amplitude finger taps.  Reflexes: Diffusely hyporeflexic 1+ biceps, brachioradialis, triceps, patellae, and achilles. Toes down-going.  Sensory: Intact to light touch  Coordination: Slow rapid alternating movements.  Gait: deferred    Investigations   Examination: Nuclear medicine DATscan for Dopamine Receptor  Localization.    Examination: NM Brain Image Tomographic (SPECT) DATscan     Date: 11/3/2020 3:21 PM     Indication: Parkinsonism     Comparison: None     Additional Information: none     Interfering Medications: None     Technique:     The patient initially received 1 ml of Lugol's solution orally prior  to the injection of 4.77 mCi of I-123 Ioflupane intravenously.     After 3 hours of uptake time the patient was imaged on a dual headed  SPECT scanner using LOREN collimators. The patients head was  immobilized prior to scanning to reduce motion. The head was scanned  with a FOV of 15 cm at 3 degrees per stop over 360 degrees, 128 x 128  x 16 bit resolution, at 30 seconds per stop to yield greater than 1.5  million counts.     Images were reconstructed using a iterative reconstruction technique.  Semi-quantitative analysis was performed as a ratio of Putamen to  Caudate Nucleus of the right and left brain when the Caudate uptake  appeared normal.     2. Technical Quality: Excellent     Subjective findings:     Relatively symmetric radiotracer uptake to the basal ganglia. There is  decreased uptake to putamina bilaterally.     Ratio Semi-quantitative analysis to Normalized (Occipital) Region:  (Ratios of activity in Caudate, Putamen  and Striatum (combined)  normalized to background region (occipital cortex) and compared  against an age matched normal group.     Right Caudate  ratio: 2.1,  Right Putamen: 2.0, Right Striatum: 2.1  Left Caudate  ratio: 2.2, Left Putamen: 1.8, Left Striatum: 2.1     Normal ratios:  (normal range is 2.5-8)                                                                      Impression:  A presynaptic dopaminergic deficit is demonstrated bilaterally.    Impression  71 year old medically complex male PMH Parkinson Disease who presents with SBO 2/2 PEG complication. Neurology consulted for second opinion on diagnosis of PD, requested from family. Follows with Dr. Hill. Exam and JERO scan consistent with Parkinsonism most likely Parkinson Disease. The final confirmation would be to look for improvement after starting dopamine replacement. Patient was planned to start Sinemet but has not yet been started due to recent difficulties with dysphagia and weight loss. Patient currently on continuous feeds and with significant weight loss. To start Sinemet he would need to be on bolus feeds, unable to absorb well if competing with protein absorption. Also recommend he stabilize on tube feed regimen and have some weight gain prior to starting Sinemet due to the common side effect of nausea. Unable to connect with family today, will attempt to talk with patient and daughter tomorrow late morning.    Recommendations  - f/u Dr. Hill 1 month  - If doing well 1-2 weeks after discharge (gaining weight/tolerating bolus feeds) start Sinemet 25/100 as follows:   - 1 tab TID for 7 days   - 1.5 tabs TID for 7 days   - 2 tabs TID for 7 days   Administration: crush and give via GT, 1 hour prior to bolus feeds   Side effects: commonly causes nausea and fatigue at lower doses. Expect side effects to improve as dose increases. Patient encouraged to continue medication despite side effects if able though dose increases.      Thank you for  involving Neurology in the care of Haresh Rock.  Please do not hesitate to call with questions/concerns (consult pager [phone]).      Patient was seen and discussed with Dr. Bajwa.    Milvia Munoz MD  PGY-2 Internal Medicine & Pediatrics

## 2021-11-01 NOTE — PROGRESS NOTES
Surgery Progress Note  11/01/2021       Subjective:  - KATIE overnight.  - Abdominal discomfort improving  - Tolerating trophic tube feeds, no nausea/vomiting   - Complaining of some phlegm in throat     Objective:  Temp:  [97.6  F (36.4  C)-98.1  F (36.7  C)] 97.6  F (36.4  C)  Pulse:  [74-81] 75  Resp:  [15-16] 16  BP: (121-147)/(65-75) 124/74  SpO2:  [96 %-97 %] 97 %    I/O last 3 completed shifts:  In: 455 [I.V.:100; NG/GT:250]  Out: 1015 [Urine:975; Emesis/NG output:40]      Gen: Awake, alert, interactive, NAD  Resp: NLB on RA  Abd: soft, nondistended, appropriately tender to palpation, incision c/d/i, G tube with TF running, bumper at the skin   Ext: WWP, no edema     Labs:  Recent Labs   Lab 11/01/21  0506 10/31/21  0715 10/30/21  0636   WBC 10.7 12.8* 16.3*   HGB 13.0* 13.9 14.5   PLT 99* 83* 95*       Recent Labs   Lab 11/01/21  0506 10/31/21  1748 10/31/21  1150 10/31/21  0759 10/31/21  0715 10/31/21  0405 10/30/21  1902 10/30/21  0844 10/30/21  0636     --   --   --  136  --   --   --  136   POTASSIUM 3.8  --   --   --  4.0  --  3.9   < > 3.1*   CHLORIDE 99  --   --   --  100  --   --   --  98   CO2 33*  --   --   --  31  --   --   --  27   BUN 34*  --   --   --  35*  --   --   --  30   CR 1.31*  --   --   --  1.34*  --   --   --  1.21   * 96 96   < > 95   < >  --    < > 140*   GILBERT 8.0*  --   --   --  8.3*  --   --   --  8.6   MAG 2.2  --   --   --   --   --   --   --  1.4*   PHOS 2.2*  --   --   --   --   --   --   --   --     < > = values in this interval not displayed.       Imaging:  No new imaging     Assessment/Plan:   71 year old male with Parkinson's disease with dysphagia and chronic constipation, afib (on coumadin), myeloproliferative disorder s/p BMT who presented with abdominal pain and distension after PEG placement by IR on 10/25/2021. On CT he was found to have distended stomach and small bowels suggestive of SBO with transition point at G tube which goes through small bowel. Now  s/p diagnostic laparoscopy, small bowel resection, abdominal washout, insertion of G tube on 10/30.    - Awaiting return of bowel function, keep trophic feeds, do not advance, keep CLD  - Okay for meds through G tube  - No need for abx from surgical perspective  - Appreciate ongoing cares by Medicine team     Seen, examined, and discussed with chief resident, who will discuss with staff.    Francine Lopez MD  PGY-2 General Surgery   (6AM-5PM please page primary team, nights/weekends/holidays page job code [phone])

## 2021-11-01 NOTE — PROGRESS NOTES
11/01/21 0958   Quick Adds   Type of Visit Initial PT Evaluation      Language english   Living Environment   People in home spouse   Current Living Arrangements house   Home Accessibility stairs to enter home;stairs within home   Number of Stairs, Main Entrance greater than 10 stairs;3   Number of Stairs, Within Home, Primary greater than 10 stairs   Transportation Anticipated family or friend will provide   Living Environment Comments Pt lives with his wife in a multi level home. Pt reports having a walk-in shower. Pt's son and daughter live within 5 miles and are around/able to assist prn.    Self-Care   Usual Activity Tolerance good   Current Activity Tolerance fair   Regular Exercise Yes   Activity/Exercise Type other (see comments)  (housework)   Equipment Currently Used at Home grab bar, toilet;grab bar, tub/shower;raised toilet seat;shower chair;walker, standard   Activity/Exercise/Self-Care Comment At baseline, pt is IND with all ADLs. Has a raised toilet seat, grab bars near toilet and shower, and a shower chair. Pt does have a FWW, but doesn't use regularly. Used the day before admission d/t weakness/fatigue.    Disability/Function   Walking or Climbing Stairs Difficulty no   Dressing/Bathing Difficulty no   Toileting issues no   Doing Errands Independently Difficulty (such as shopping) no   Fall history within last six months no   Change in Functional Status Since Onset of Current Illness/Injury yes   General Information   Onset of Illness/Injury or Date of Surgery 10/29/21   Referring Physician Javi Leon MD   Patient/Family Therapy Goals Statement (PT) Increase strength and return home   Pertinent History of Current Problem (include personal factors and/or comorbidities that impact the POC) 71 year old male with Parkinson's disease with dysphagia and chronic constipation, afib (on coumadin), myeloproliferative disorder s/p BMT who presented with abdominal pain and distension after PEG  placement by IR on 10/25/2021. On CT he was found to have distended stomach and small bowels suggestive of SBO with transition point at G tube which goes through small bowel. Now s/p diagnostic laparoscopy, small bowel resection, abdominal washout, insertion of G tube on 10/30.   Existing Precautions/Restrictions abdominal   General Observations RA, 98%spo2, 82 HR   Cognition   Orientation Status (Cognition) oriented x 3   Affect/Mental Status (Cognition) WFL   Follows Commands (Cognition) WFL   Pain Assessment   Patient Currently in Pain No   Strength   Strength Comments Not formally assessed, generalized weakness with slow mobility and transfers.    Bed Mobility   Comment (Bed Mobility) SBA x1, vc's for log roll, slow moving   Transfers   Transfer Safety Comments CGA x1 sit<>stand, relies heavily on UE support, increased time/effort.    Gait/Stairs (Locomotion)   Comment (Gait/Stairs) Ambulates 20 ft in the room with FWW, forward flexed, CGA x1, shuffled steps, no LOB, slow gait.    Balance   Balance Comments relies on FWW for balance   Clinical Impression   Criteria for Skilled Therapeutic Intervention yes, treatment indicated   PT Diagnosis (PT) impaired functional mobility   Influenced by the following impairments weakness, deconditioning   Functional limitations due to impairments ambulation, stair negotiation, home mobility   Clinical Presentation Stable/Uncomplicated   Clinical Presentation Rationale clinical judgement   Clinical Decision Making (Complexity) low complexity   Therapy Frequency (PT) 5x/week   Predicted Duration of Therapy Intervention (days/wks) 2 weeks   Planned Therapy Interventions (PT) balance training;bed mobility training;gait training;home exercise program;stair training;strengthening   Risk & Benefits of therapy have been explained evaluation/treatment results reviewed;care plan/treatment goals reviewed;risks/benefits reviewed;current/potential barriers reviewed;participants voiced  agreement with care plan;participants included;patient;son   PT Discharge Planning    PT Discharge Recommendation (DC Rec) home with assist;home with home care physical therapy   PT Rationale for DC Rec Patient moving slow but steady, has good support at home. With good progress may be able to DC home with family support, may benefit from home care services   PT Brief overview of current status  1A FWW   Total Evaluation Time   Total Evaluation Time (Minutes) 8

## 2021-11-01 NOTE — PLAN OF CARE
"/65 (BP Location: Right arm)   Pulse 81   Temp 98.1  F (36.7  C) (Oral)   Resp 16   Ht 1.804 m (5' 11.02\")   Wt 68.5 kg (151 lb)   SpO2 96%   BMI 21.05 kg/m      Assumed cares [id number]  Neuro: lethargic; oriented x4  Pain/Nausea: Oxycodone x1, tylenol x1; denies nausea  Cardiac: tele- A fib  Resp: lung sounds diminished, equal bilaterally  GI/: incontinent of urine, using Primofit, applied @1500; no BM, not passing gas yet  Diet/Appetite: strict NPO; TF @15mL/hr  BG: once a day, 90's  Skin: blanchable redness on sacrum- Mepliex in place, Pulsate mattress ordered; abdominal incision + 3 lap sites- SHIRLENE; bruising on L wrist  Access: PIV- TKO + abx; PIV- hep gtt  Drains: PEG- TF; Primofit  Activity: Ax2 + walker + gait belt; up this shift w/ OT  Plan: pt needs a care conference to talk about NPO status and goals of care.   Will continue with plan of care and notify team of any changes.?    Elizabeth Ghosh RN on 10/31/2021 at 10:38 PM      "

## 2021-11-01 NOTE — PROGRESS NOTES
Pt's daughter, Gemma, came to 7A desk this AM.    Crying, upset about her father's G-tube placement and surgery.    Requested information from this writer after reporting having spoken to a physician yesterday about the tube placement; was very concerned about pt's increased pain and need for surgery on admission earlier this week.  Spent time with her while bedside RN paged primary team to come see them.  Offered to take Gemma to Meditation Room as a quiet space for her to speak, cry - she declined.  Calmed her enough that she could return to bedside, Surgery team arrived for rounds.  This writer requested they speak to her about procedure and answer her questions.    Provided Gemma with Patient Relations contact information, she stated she would call them when she felt more settled.

## 2021-11-01 NOTE — CONSULTS
WO Nurse Inpatient Wound Assessment   Reason for consultation: Evaluate and treat sacral wounds    Assessment  Bilateral buttock/ gluteal crease wounds due to Friction and Incontinence Associated Dermatitis (IAD)  Status: initial assessment    Treatment Plan  Follow incontinence protocol.     Bilateral buttock and gluteal crease wounds: BID     Cleanse the area with Polina cleanse and protect, very gently with soft cloth.    Apply thin layer of critic aid paste on top of reddened and open areas.    With repeat application, do not scrub the paste, only remove soiled paste and reapply.    If complete removal of paste is necessary use baby oil/mineral oil (#[id number]) and soft wash cloth.    Ensure pt has Jonny-cushion (#[id number]) while sitting up in the chair.    Use only one Covidien pad in between mattress and pt. No brief while in bed.       Orders Written  Recommended provider order: None, at this time  WO Nurse follow-up plan:signing off. If wounds deteriorate please re-consult WO team  Nursing to notify the Provider(s) and re-consult the WOC Nurse if wound(s) deteriorates or new skin concern.    Patient History  According to provider note(s):  71 year old male with Parkinson's disease with dysphagia and chronic constipation, afib (on coumadin), myeloproliferative disorder s/p BMT who presented with abdominal pain and distension after PEG placement by IR on 10/25/2021. On CT he was found to have distended stomach and small bowels suggestive of SBO with transition point at G tube which goes through small bowel. Now s/p diagnostic laparoscopy, small bowel resection, abdominal washout, insertion of G tube on 10/30.    Objective Data  Containment of urine/stool: Diaper, Incontinent pad in bed and External catheter    Active Diet Order  Orders Placed This Encounter      NPO per Anesthesia Guidelines for Procedure/Surgery Except for: Meds      Output:   I/O last 3 completed shifts:  In: 455 [I.V.:100; NG/GT:250]  Out: 1015  [Urine:975; Emesis/NG output:40]    Risk Assessment:   Sensory Perception: 3-->slightly limited  Moisture: 3-->occasionally moist  Activity: 3-->walks occasionally  Mobility: 3-->slightly limited  Nutrition: 2-->probably inadequate  Friction and Shear: 2-->potential problem  Christoph Score: 16                          Labs: Recent Labs   Lab 11/01/21  0506 10/31/21  0715 10/31/21  0715   ALBUMIN  --   --  2.1*   HGB 13.0*   < > 13.9   INR 1.53*   < > 1.48*   WBC 10.7   < > 12.8*   .0*   < > 150.0*    < > = values in this interval not displayed.       Physical Exam  Areas of skin assessed: focused bilateral buttock, gluteal crease, sacrum    Wound Location:  Bilateral buttock and gluteal crease    Date of last photo 11/1  Wound History: resent on admission, patient with intermittent incontinence.    Wound Base: 100 % erythema and epidermis     Palpation of the wound bed: normal      Drainage: none     Description of drainage: none     Measurements (length x width x depth, in cm) 8 x 10 x 0 cm      Tunneling N/A     Undermining N/A  Periwound skin: intact      Color: pink and red      Temperature: normal   Odor: none  Pain: mild, tender  Interventions  Visual inspection and assessment completed  Wound Care Rationale Protect periwound skin, Improve absorptive capacity and Provide protection   Wound Care: done per plan of care  Supplies: placed at the bedside, discussed with RN and discussed with patient  Current off-loading measures: Pillows  Current support surface: Standard  Low air loss mattress  Education provided to: plan of care, wound progress, Moisture management and Hygiene  Discussed plan of care with Patient and Nurse    Randee Mohamud RN, CWOCN

## 2021-11-01 NOTE — CONSULTS
Care Management Initial Consult    General Information  Assessment completed with: Patient, Care Team Member, -chart review,    Type of CM/SW Visit: Initial Assessment    Primary Care Provider verified and updated as needed: Yes   Readmission within the last 30 days: no previous admission in last 30 days      Reason for Consult: care coordination/care conference, discharge planning  Advance Care Planning:            Communication Assessment  Patient's communication style: spoken language (English or Bilingual)    Hearing Difficulty or Deaf: no   Wear Glasses or Blind: no    Cognitive  Cognitive/Neuro/Behavioral: WDL  Level of Consciousness: alert  Arousal Level: opens eyes spontaneously  Orientation: oriented x 4  Mood/Behavior: calm  Best Language: 0 - No aphasia  Speech: clear    Living Environment:   People in home: spouse     Current living Arrangements: house      Able to return to prior arrangements: yes       Family/Social Support:  Care provided by: self  Provides care for: no one  Marital Status:   Wife, Children          Description of Support System: Supportive, Involved         Current Resources:   Patient receiving home care services: Yes  Skilled Home Care Services: Skilled Nursing  Community Resources: Home Infusion  Equipment currently used at home: grab bar, toilet, grab bar, tub/shower, raised toilet seat, shower chair, walker, standard  Supplies currently used at home: Enteral Nutrition & Supplies    Employment/Financial:  Employment Status:          Financial Concerns: No concerns identified           Lifestyle & Psychosocial Needs:  Social Determinants of Health     Tobacco Use: Low Risk      Smoking Tobacco Use: Never Smoker     Smokeless Tobacco Use: Never Used   Alcohol Use:      Frequency of Alcohol Consumption:      Average Number of Drinks:      Frequency of Binge Drinking:    Financial Resource Strain:      Difficulty of Paying Living Expenses:    Food Insecurity:      Worried  "About Running Out of Food in the Last Year:      Ran Out of Food in the Last Year:    Transportation Needs:      Lack of Transportation (Medical):      Lack of Transportation (Non-Medical):    Physical Activity:      Days of Exercise per Week:      Minutes of Exercise per Session:    Stress:      Feeling of Stress :    Social Connections:      Frequency of Communication with Friends and Family:      Frequency of Social Gatherings with Friends and Family:      Attends Buddhism Services:      Active Member of Clubs or Organizations:      Attends Club or Organization Meetings:      Marital Status:    Intimate Partner Violence:      Fear of Current or Ex-Partner:      Emotionally Abused:      Physically Abused:      Sexually Abused:    Depression: Not at risk     PHQ-2 Score: 1   Housing Stability:      Unable to Pay for Housing in the Last Year:      Number of Places Lived in the Last Year:      Unstable Housing in the Last Year:        Functional Status:  Prior to admission patient needed assistance:   Dependent ADLs:: Independent  Dependent IADLs:: Independent    Additional Information:  Pt readmitted on 10/30 with SBO, now s/p laparoscopy, small bowel resectionabdominal washout, insertion of G tube on 10/30.  Pt with a medical history significant for Parkinson's disease with dysphagia and chronic constipation, afib (on coumadin), myeloproliferative disorder s/p BMT who presented with abdominal pain and distension after PEG placement by IR on 10/25/2021.    Confirmed with [facility] that they had opened patient to services..  [facility] confirmed home enteral coverage: 10/26/2021 PT MEETS MEDICARE CRITERIA FOR ENTERAL. ANTICIPATED LENGTH OF THERAPY MUST BE 90 DAYS OR GREATER AT THE TIME OF SERVICE.\".    Per care team rounds discharge pending neurology consult, tolerance of enteral feeds and PT/OT recommendations. Team notes family concerned about pt readmission and post op complications.  At this time holding on plan for care " conference but will update should plan be to pursue care conference.     PT recommending home with home PT.  OT recommending home with assist.  Updated [facility] regarding home PT need.    Met with pt.  Introduced RNCC role.  Per discussion with pt he received all of his enteral supplies when discharged on 10/26.  Pt notes no concerns regarding his ability and his wife's ability to manage home enteral feeds.  Per pt he manages all his own care needs.  .  Per pt his son and daughter live close by and are involved.   Pt notes no concerns regarding his ability to return home.  Agreed to f/u with pt once final plan for discharge has been confirmed.    Home infusion orders placed.     Notified by [facility] that agency was providing DME no RN services.   Pt was doing gravity feeds.  Will need to clarify if patient will discharge on continuous enteral feeds as patient will need enteral education for pump management etc.     Email referral sent to [facility] for home PT with possible need for home RN.     Cathryn Mclean RN BSN, PHN, ACM-RN  7A RN Care Coordinator  Phone: [phone]  Pager [phone]  To contact the weekend RNCC, Page: [phone]    11/1/2021 2:09 PM

## 2021-11-01 NOTE — PROGRESS NOTES
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    #Abdominal Pain  #Small bowel obstruction  #Trans-enteric course of the G tube through small bowel  #S/P g-tube correction and small bowel resection  Presented with abdominal pain, vomiting found on imaging to have SBO caused by his G-tube with a trans-enteric path. S/p diagnostic laparoscopy, small bowel resection, abdominal washout, and insertion of G tube on 10/30. Awaiting return of bowel function. IR discussed case with family 10/30.   - Surgery following   - Continue trophic feeds at 15 ml/h, do not advance   - Okay for meds via G tube   - Zosyn discontinued  - PT/OT  - Family interested in discussion with patient relations, provided contact information    #Paroxysmal atrial fibrillation  #Hx of DVT  #Antiphospholipid antibody syndrome  CHADS2-VASC score is 2 (age, HTN). Well controlled at home. Switch from Metoprolol succinate 25 to Metoprolol tartrate 12.5mg BID for compatibility with G-tube 10/30. Will resume home regimen now okay for meds via G tube. Anticoagulation held for operation, restarted 10/31 with plan for heparin bridge  to warfarin (goal INR 2-3).  - Cardiology signed off  - Continue PTA flecainide 50 mg BID  - Continue PTA metoprolol succinate 25 mg qd  - Continue heparin gtt  - Continue warfarin, appreciate pharm dosing (goal INR 2-3)  - Daily INR  - Cardiac telemetry    #Dysphagia  In setting of Parkinson's disease. Per SLP review, most recent VFSS completed as an OP on 9/15/21 showed progressive, severe dysphagia with aspiration noted during and after the swallow. Pt was discharged from OP SLP due to limited expected progress.  - NPO while inpatient    #Parkinson's  #L foot drop  Following with outpatient neurology, not on carbidopa-levodopa. Family interested in inpatient neurology second opinion. Findings on exam are consistent with Parkinsonism. Outpatient JERO scan has confirmed a presynaptic dopaminergic deficit bilaterally.   - Neurology consult 11/1   - f/u OP in 1 month with Dr. Hill   - Consider Sinemet trial 1-2 weeks after discharge     # BPH with urinary retention s/p TURP  # Bacteruria, asymptomatic   S/p TURP 8/9/21 for urinary retention. Post-operatively voided well with mild incontinence. UC 10/27 with ,000 Klebsiella, but in the absence of symptoms not treated. Most recently seen by urology 8/17/21 without acute concerns with plans to follow up in 3 months.   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 Follows closely with BMT     #Hx of Type II DM  Last A1C 5.5, diet controlled.       Diet: NPO per Anesthesia Guidelines for Procedure/Surgery Except for: Meds  Adult Formula Drip Feeding: Continuous Osmolite 1.5; Gastrostomy; Goal Rate: 15; mL/hr; Medication - Feeding Tube Flush Frequency: At least 15-30 mL water before and after medication administration and with tube clogging; Amount to Send  (Nutrition...    DVT Prophylaxis: Warfarin, heparin gtt bridge  Dill Catheter: Not present  Central Lines: None  Code Status: Full Code      Disposition Plan   Expected discharge: 11/02/2021 1-2 days recommended to TBD once adequate pain management/ tolerating PO medications, antibiotic plan established and bowel function restored and patent g-tube..     The patient's care was discussed with the Attending Physician, Dr. Jacob.    Ángel Marie MD  [address]  Securely message with the Vocera Web Console (learn more here)  Text page via [employer] Paging/Directory    Please see sign in/sign out for up to date coverage information    Clinically Significant Risk Factors Present on Admission           # Severe Malnutrition, POA: based on Reduced intake;Subcutaneous fat loss;Muscle loss (10/31/21 1300)     ______________________________________________________________________    Interval History   No acute events overnight. Patient is urinating and ambulating with more strength that immediately post-op. Still concerned about taking in ice chips and water by mouth to assist with secretions. SLP feeling patient is aspiration risk. Pain managed with tylenol and oxycodone. Feeling comfortable post operatively.    Data reviewed today: I reviewed all medications, new labs and imaging results over the last 24 hours. I personally reviewed.    Physical Exam   Vital Signs: Temp: 97.6  F (36.4  C) Temp src: Oral BP: 124/74 Pulse: 75   Resp: 16 SpO2: 97 % O2 Device: None (Room air)    Weight: 151 lbs 0 oz  Gen: lying in bed, in NAD  CV: regular rate, well perfused  Pulm: no increased WOB on RA  GI: soft, ND, tender to palpation around G tube site. G tube to gravity. Dressing in place c/d/i.  : Dill in place draining yellow urine  Neuro: alert, conversant, responds to questions appropriately  Ext: no bilateral peripheral extremity edema, warm  Psych: anxious    Data    Recent Labs   Lab 11/01/21  0506 10/31/21  1748 10/31/21  1150 10/31/21  0759 10/31/21  0715 10/31/21  0405 10/30/21  1902 10/30/21  1418 10/30/21  0638 10/30/21  0636 10/30/21  0636   WBC 10.7  --   --   --  12.8*  --   --   --   --   --  16.3*   HGB 13.0*  --   --   --  13.9  --   --   --   --   --  14.5   MCV 94  --   --   --  94  --   --   --   --   --  92   PLT 99*  --   --   --  83*  --   --   --   --   --  95*   INR 1.53*  --   --   --  1.48*  --   --  1.32*   < >  --   --      --   --   --  136  --   --   --   --   --  136   POTASSIUM 3.8  --   --   --  4.0  --  3.9  --   --    < > 3.1*   CHLORIDE 99  --   --   --  100  --   --   --   --   --  98   CO2 33*  --   --   --  31  --   --   --   --   --  27   BUN 34*  --   --   --  35*  --   --   --   --   --  30   CR 1.31*  --   --   --  1.34*  --   --   --   --   --  1.21   ANIONGAP 2*  --   --   --  5  --   --   --   --   --  11   GILBERT 8.0*  --   --   --  8.3*  --   --   --   --   --  8.6   * 96 96   < > 95   < >  --   --    < >   < > 140*   ALBUMIN  --   --   --   --  2.1*  --   --   --   --   --  2.3*   PROTTOTAL  --   --   --   --  6.2*  --   --   --   --   --  6.3*   BILITOTAL  --   --   --   --  1.1  --   --   --   --   --  0.7   ALKPHOS  --   --   --   --  62  --   --   --   --   --  69   ALT  --   --   --   --  7  --   --   --   --   --  9   AST  --   --   --   --  18  --   --   --   --   --  16    < > = values in this interval not displayed.     No results found for this or any previous visit (from the past 24 hour(s)).  Medications     dextrose       heparin 950 Units/hr (10/31/21 5310)     - MEDICATION INSTRUCTIONS -       Warfarin Therapy Reminder         dutasteride  0.5 mg Oral Daily     flecainide  50 mg Oral Q12H JEAN-CLAUDE     metoprolol succinate ER  25 mg Oral Daily     multivitamin w/minerals  1 tablet Oral Daily     pantoprazole  40 mg Oral QAM AC     rosuvastatin  40 mg Oral At Bedtime     sodium chloride (PF)  3 mL Intracatheter Q8H      sodium fluoride dental gel   Dental Daily     vitamin D3  50 mcg Oral Daily

## 2021-11-01 NOTE — PLAN OF CARE
"/70 (BP Location: Right arm)   Pulse 82   Temp 97.8  F (36.6  C) (Oral)   Resp 18   Ht 1.804 m (5' 11.02\")   Wt 63 kg (139 lb)   SpO2 97%   BMI 19.37 kg/m      Shift: 2023-8476  VS: VSS on RA, afebrile  Neuro: AOx4  Labs: phos 2.0 IV replacement ordered; hep10a therapeutic, recheck in AM  Respiratory: wdl  Pain/Nausea/PRN: denies, scheduled tylenol given  Diet: NPO, TF @ 15ml/h via Gtube  LDA: PIV infusing heparin gtt @ 950 unit/h  GI/: voiding adequately, passing small amounts of gas  Skin: incisions dermabonded; redness on buttocks and perineum  Mobility: assist x1 with GB and walker, ambulated halls, up in chair  Plan: awaiting return of bowel function    Handoff given to following RN.    "

## 2021-11-02 ENCOUNTER — APPOINTMENT (OUTPATIENT)
Dept: OCCUPATIONAL THERAPY | Facility: CLINIC | Age: 71
DRG: 326 | End: 2021-11-02
Payer: COMMERCIAL

## 2021-11-02 LAB
ANION GAP SERPL CALCULATED.3IONS-SCNC: 3 MMOL/L (ref 3–14)
BUN SERPL-MCNC: 29 MG/DL (ref 7–30)
CALCIUM SERPL-MCNC: 8.4 MG/DL (ref 8.5–10.1)
CHLORIDE BLD-SCNC: 101 MMOL/L (ref 94–109)
CO2 SERPL-SCNC: 32 MMOL/L (ref 20–32)
CREAT SERPL-MCNC: 1.05 MG/DL (ref 0.66–1.25)
GFR SERPL CREATININE-BSD FRML MDRD: 70 ML/MIN/1.73M2
GLUCOSE BLD-MCNC: 132 MG/DL (ref 70–99)
GLUCOSE BLDC GLUCOMTR-MCNC: 164 MG/DL (ref 70–99)
INR PPP: 1.93 (ref 0.85–1.15)
MAGNESIUM SERPL-MCNC: 2.1 MG/DL (ref 1.6–2.3)
PHOSPHATE SERPL-MCNC: 2.1 MG/DL (ref 2.5–4.5)
POTASSIUM BLD-SCNC: 3.5 MMOL/L (ref 3.4–5.3)
SODIUM SERPL-SCNC: 136 MMOL/L (ref 133–144)
UFH PPP CHRO-ACNC: 0.18 IU/ML
UFH PPP CHRO-ACNC: 0.4 IU/ML
UFH PPP CHRO-ACNC: 0.5 IU/ML

## 2021-11-02 PROCEDURE — 250N000013 HC RX MED GY IP 250 OP 250 PS 637

## 2021-11-02 PROCEDURE — 99232 SBSQ HOSP IP/OBS MODERATE 35: CPT | Mod: GC | Performed by: PEDIATRICS

## 2021-11-02 PROCEDURE — 97535 SELF CARE MNGMENT TRAINING: CPT | Mod: GO | Performed by: OCCUPATIONAL THERAPIST

## 2021-11-02 PROCEDURE — 250N000013 HC RX MED GY IP 250 OP 250 PS 637: Performed by: HOSPITALIST

## 2021-11-02 PROCEDURE — 85520 HEPARIN ASSAY: CPT

## 2021-11-02 PROCEDURE — 99233 SBSQ HOSP IP/OBS HIGH 50: CPT | Mod: GC | Performed by: PSYCHIATRY & NEUROLOGY

## 2021-11-02 PROCEDURE — 99207 PR CDG-MDM COMPONENT: MEETS MODERATE - DOWN CODED: CPT | Performed by: PEDIATRICS

## 2021-11-02 PROCEDURE — 84100 ASSAY OF PHOSPHORUS: CPT | Performed by: STUDENT IN AN ORGANIZED HEALTH CARE EDUCATION/TRAINING PROGRAM

## 2021-11-02 PROCEDURE — 120N000002 HC R&B MED SURG/OB UMMC

## 2021-11-02 PROCEDURE — 250N000013 HC RX MED GY IP 250 OP 250 PS 637: Performed by: PEDIATRICS

## 2021-11-02 PROCEDURE — 85610 PROTHROMBIN TIME: CPT | Performed by: STUDENT IN AN ORGANIZED HEALTH CARE EDUCATION/TRAINING PROGRAM

## 2021-11-02 PROCEDURE — 83735 ASSAY OF MAGNESIUM: CPT | Performed by: STUDENT IN AN ORGANIZED HEALTH CARE EDUCATION/TRAINING PROGRAM

## 2021-11-02 PROCEDURE — 85520 HEPARIN ASSAY: CPT | Performed by: STUDENT IN AN ORGANIZED HEALTH CARE EDUCATION/TRAINING PROGRAM

## 2021-11-02 PROCEDURE — 250N000013 HC RX MED GY IP 250 OP 250 PS 637: Performed by: STUDENT IN AN ORGANIZED HEALTH CARE EDUCATION/TRAINING PROGRAM

## 2021-11-02 PROCEDURE — 80048 BASIC METABOLIC PNL TOTAL CA: CPT | Performed by: STUDENT IN AN ORGANIZED HEALTH CARE EDUCATION/TRAINING PROGRAM

## 2021-11-02 PROCEDURE — 250N000011 HC RX IP 250 OP 636: Performed by: STUDENT IN AN ORGANIZED HEALTH CARE EDUCATION/TRAINING PROGRAM

## 2021-11-02 PROCEDURE — 36415 COLL VENOUS BLD VENIPUNCTURE: CPT | Performed by: STUDENT IN AN ORGANIZED HEALTH CARE EDUCATION/TRAINING PROGRAM

## 2021-11-02 RX ORDER — SALIVA STIMULANT COMB. NO.3
1-2 SPRAY, NON-AEROSOL (ML) MUCOUS MEMBRANE 4 TIMES DAILY
Status: DISCONTINUED | OUTPATIENT
Start: 2021-11-02 | End: 2021-11-04 | Stop reason: HOSPADM

## 2021-11-02 RX ORDER — WARFARIN SODIUM 2.5 MG/1
2.5 TABLET ORAL
Status: COMPLETED | OUTPATIENT
Start: 2021-11-02 | End: 2021-11-02

## 2021-11-02 RX ORDER — POTASSIUM CHLORIDE 20MEQ/15ML
40 LIQUID (ML) ORAL DAILY
Status: DISCONTINUED | OUTPATIENT
Start: 2021-11-02 | End: 2021-11-04 | Stop reason: HOSPADM

## 2021-11-02 RX ORDER — POTASSIUM CHLORIDE 1.5 G/1.58G
40 POWDER, FOR SOLUTION ORAL ONCE
Status: DISCONTINUED | OUTPATIENT
Start: 2021-11-02 | End: 2021-11-02

## 2021-11-02 RX ORDER — CARBOXYMETHYLCELLULOSE SODIUM 5 MG/ML
2 SOLUTION/ DROPS OPHTHALMIC
Status: DISCONTINUED | OUTPATIENT
Start: 2021-11-02 | End: 2021-11-04 | Stop reason: HOSPADM

## 2021-11-02 RX ADMIN — FLECAINIDE ACETATE 50 MG: 50 TABLET ORAL at 08:23

## 2021-11-02 RX ADMIN — Medication 50 MCG: at 08:23

## 2021-11-02 RX ADMIN — Medication 15 ML: at 08:23

## 2021-11-02 RX ADMIN — ACETAMINOPHEN 650 MG: 325 SOLUTION ORAL at 19:12

## 2021-11-02 RX ADMIN — WARFARIN SODIUM 2.5 MG: 2.5 TABLET ORAL at 19:13

## 2021-11-02 RX ADMIN — Medication 40 MG: at 08:23

## 2021-11-02 RX ADMIN — Medication 12.5 MG: at 20:12

## 2021-11-02 RX ADMIN — Medication 2 SPRAY: at 16:20

## 2021-11-02 RX ADMIN — Medication 12.5 MG: at 08:30

## 2021-11-02 RX ADMIN — POTASSIUM CHLORIDE 40 MEQ: 40 SOLUTION ORAL at 16:20

## 2021-11-02 RX ADMIN — POTASSIUM & SODIUM PHOSPHATES POWDER PACK 280-160-250 MG 1 PACKET: 280-160-250 PACK at 16:22

## 2021-11-02 RX ADMIN — ROSUVASTATIN 40 MG: 10 TABLET, FILM COATED ORAL at 22:19

## 2021-11-02 RX ADMIN — ACETAMINOPHEN 650 MG: 325 SOLUTION ORAL at 11:53

## 2021-11-02 RX ADMIN — FLECAINIDE ACETATE 50 MG: 50 TABLET ORAL at 22:59

## 2021-11-02 RX ADMIN — SODIUM FLUORIDE: 5 GEL DENTAL at 22:26

## 2021-11-02 RX ADMIN — POTASSIUM & SODIUM PHOSPHATES POWDER PACK 280-160-250 MG 1 PACKET: 280-160-250 PACK at 20:12

## 2021-11-02 RX ADMIN — HEPARIN SODIUM 950 UNITS/HR: 10000 INJECTION, SOLUTION INTRAVENOUS at 02:25

## 2021-11-02 RX ADMIN — ACETAMINOPHEN 650 MG: 325 SOLUTION ORAL at 05:53

## 2021-11-02 ASSESSMENT — ACTIVITIES OF DAILY LIVING (ADL)
ADLS_ACUITY_SCORE: 17
ADLS_ACUITY_SCORE: 13
ADLS_ACUITY_SCORE: 18
ADLS_ACUITY_SCORE: 13
ADLS_ACUITY_SCORE: 17
ADLS_ACUITY_SCORE: 13
ADLS_ACUITY_SCORE: 18
ADLS_ACUITY_SCORE: 13
ADLS_ACUITY_SCORE: 17
ADLS_ACUITY_SCORE: 13
ADLS_ACUITY_SCORE: 18
ADLS_ACUITY_SCORE: 17
ADLS_ACUITY_SCORE: 13
ADLS_ACUITY_SCORE: 17
ADLS_ACUITY_SCORE: 13

## 2021-11-02 ASSESSMENT — MIFFLIN-ST. JEOR: SCORE: 1425.51

## 2021-11-02 NOTE — PROGRESS NOTES
CLINICAL NUTRITION SERVICES - BRIEF NOTE     Nutrition Prescription    Recommendations already ordered by Registered Dietitian (RD):  Advance TF by 10 ml/hr q 6 hours to goal rate of 55 ml/hr.      Future/Additional Recommendations:  Once goal rate well tolerated for at least 12-24 hours, recommend attempting to transition to gravity bolus feedings (which is the method patient was doing at home for the few days prior to admission).      Stop continuous TF for 1-2 hrs to let stomach empty prior to starting gravity feeding. Begin 1st gravity feeding @ 125 mL x 2 feedings (separate 3-4 hrs apart). If tolerated, increase vol to 250 mL x 2 feedings and then increase to goal vol of 500 ml x 3 feedings per day.  Flush with 30-60 mL of H20 before and after each feeding + an additional 120 ml water QID to provide full hydration if not taking oral fluids. Do not give bolus feedings at night while pt asleep (during the day only).       EVALUATION OF THE PROGRESS TOWARD GOALS   Diet: NPO  Nutrition Support: Osmolite 1.5 @ 15 ml/hr      NEW FINDINGS   Pt feels well, has been passing gas.      INTERVENTIONS  Discussed TF with patient/family.  Reviewed surgery note with primary team, okay with advancement of TF.  Reviewed plan for eventual transition to gravity bolus feedings once goal rate well tolerated on continuous.     Monitoring/Evaluation  Progress toward goals will be monitored and evaluated per protocol.     Farrah Reno MS, RD, LD, CCTD  7A/Obs units, pager [phone]

## 2021-11-02 NOTE — PROGRESS NOTES
"[facility]  Neurology Consultation - Progress Note    Patient Name:  Haresh Rock  MRN:  [id number]    :  1950  Date of Service:  2021  Primary care provider:  Anya Nowak      Patient Summary:   71 year old medically complex male PMH Parkinson Disease who presents with SBO 2/2 PEG complication.     Patient was admitted for SBO following GT misplacement. Family requesting neurology consult for second opinion of Parkinson disease. 2 years PTA Haresh developed dysphagia, weight loss and bradykinesia. He was seen by speech pathology who confirmed aspiration and referred him to neurology. He was first seen by Dr. Hill in 2020, and he was concerned for parkinsonism. Dopamine scan 11/3/2020 demonstrated a bilateral presynaptic dopaminergic deficit. Last seen on 10/8/2021 and discussed starting patient on carbidopa/levodopa 25/100 but has not yet been started due to progressive swallowing difficulties and plan for G-tube placement.  Patient reports worsening swallowing and bradykinesia over the past year as well as the development of macrographia and bilateral hand tremor at rest.     Interval Events: Nursing notes reviewed. No major overnight events. Neurology team discussed with patient and family/daughter Gemma at bedside this morning.    Physical Examination   Vitals: /74 (BP Location: Right arm)   Pulse 78   Temp 98.6  F (37  C) (Oral)   Resp 18   Ht 1.804 m (5' 11.02\")   Wt 65.8 kg (145 lb 1 oz)   SpO2 98%   BMI 20.22 kg/m    General: Adult patient, lying in bed, NAD. Masked facies/hypomimia, cachecitc  HEENT: NC/AT, no icterus. Decreased eye blinking  Chest: non-labored on RA  Extremities: Warm, no edema  Skin: Scattered ecchymoses   Psych: Mood pleasant, affect congruent  Neuro:  Mental status: Awake, alert, attentive, oriented to self, time, place, and circumstance. Language is fluent, hypokinetic,. Coherent with intact " comprehension of complex commands.  Cranial nerves: PERRL, conjugate gaze, EOMI, facial sensation intact, face symmetric, tongue/uvula midline, no dysarthria.   Motor: Diffuse atrophy. No abnormal movements, no tremor noted. 5/5 strength in 4/4 extremities. Mild rigidity in RUE. Deteriorating amplitude finger taps.  Reflexes: Diffusely hyporeflexic 1+ biceps, brachioradialis, triceps, patellae, and achilles. Toes down-going.  Sensory: Intact to light touch  Coordination: Slow rapid alternating movements.  Gait: deferred    Investigations   none    Impression  71 year old medically complex male PMH Parkinson Disease who presents with SBO 2/2 PEG complication. Neurology consulted for second opinion on diagnosis of PD, requested from family. Follows with Dr. Hill. Exam and JERO scan consistent with Parkinsonism most likely Parkinson Disease. The final confirmation would be to look for improvement after starting dopamine replacement. Patient was planned to start Sinemet but has not yet been started due to recent difficulties with dysphagia and weight loss. Patient currently on continuous feeds and with significant weight loss. To start Sinemet he would need to be on bolus feeds, unable to absorb well if competing with protein absorption. Also recommend he stabilize on tube feed regimen and have some weight gain prior to starting Sinemet due to the common side effect of nausea. Unable to connect with family today, will attempt to talk with patient and daughter tomorrow late morning.    Recommendations  - f/u Dr. Hill 1 month  - If doing well 1-2 weeks after discharge (gaining weight/tolerating bolus feeds) start Sinemet 25/100 as follows:   - 1 tab TID for 7 days   - 1.5 tabs TID for 7 days   - 2 tabs TID for 7 days   Administration: crush and give via GT, 2 hours after feeds and 1 hour prior to next feed   Side effects: commonly causes nausea and fatigue at lower doses. Expect side effects to improve as dose increases.  Patient encouraged to continue medication despite side effects if able though dose increases.    Thank you for involving Neurology in the care of Haresh Rock.  Please do not hesitate to call with questions/concerns (consult pager [phone]).      Patient was seen and discussed with Dr. Casas. Had a long discussion with patient and his daughter, Gemma, today about a diagnosis of Parkinson's disease and prognosis. All questions were answered.    Milvia Munoz MD  PGY-2 Internal Medicine & Pediatrics    Attending physician: I saw and evaluated the patient with the resident team and I agree with the findings and plan of care as per Dr. Munoz above, with the following additions.    Neurology was asked to evaluate this patient for parkinsonism following feeding tube placement complicated by migration into small bowel with resultant small bowel obstruction.    Long conversation with patient and daughter at bedside. I explained that the patient's exam and previous DATscan clearly indicate a parkinsonian condition, most likely idiopathic Parkinson disease versus Parkinson plus syndrome (rarer). Minimal to absent tremor certainly does not exclude the former.    I further explained that idiopathic Parkinson disease is defined by response to carbidopa/levodopa therapy. I would not start this at the present time until he is on a stable feeding regimen via G tube, particularly as continuous infusion of tube feeds will interfere with absorption of levodopa. This medication should be given on an empty stomach one hour before or two hours after meals. Upward titration as outlined above will be needed, typically to 2-3 Sinemet 25/100 tabs TID before concluding that there is no response--this may take up to a month, which is another reason why I do not favor starting this during inpatient stay.    Patient should continue follow up in the movement disorders clinic after discharge as he has been doing.    The patient and his  daughter appeared to appreciate the review. I do not have any further recommendations at this time; please call with questions.    Mendoza Casas MD   of Neurology

## 2021-11-02 NOTE — PROGRESS NOTES
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    #Abdominal Pain  #Small bowel obstruction  #Trans-enteric course of the G tube through small bowel  #S/P g-tube correction and small bowel resection  Presented with abdominal pain, vomiting found on imaging to have SBO caused by his G-tube with a trans-enteric path. S/p diagnostic laparoscopy, small bowel resection, abdominal washout, and insertion of G tube on 10/30. Awaiting return of bowel function. IR discussed case with family 10/30.   - Surgery following   - Okay to advance tube feeds per surgery and nutrition   - Okay for meds via G tube   - Zosyn discontinued  - PT/OT  - Family interested in discussion with patient relations, provided contact information    #Paroxysmal atrial fibrillation  #Hx of DVT  #Antiphospholipid antibody syndrome  CHADS2-VASC score is 2 (age, HTN). Well controlled at home. Switch from Metoprolol succinate 25 to Metoprolol tartrate 12.5mg BID for compatibility with G-tube 10/30. Will resume home regimen now okay for meds via G tube. Anticoagulation held for  operation, restarted 10/31 with plan for heparin bridge to warfarin (goal INR 2-3).  - Cardiology signed off  - Continue PTA flecainide 50 mg BID  - Continue PTA metoprolol succinate 25 mg qd  - Continue heparin gtt  - Continue warfarin, appreciate pharm dosing (goal INR 2-3)  - Daily INR  - Cardiac telemetry    #Dysphagia  In setting of Parkinson's disease. Per SLP review, most recent VFSS completed as an OP on 9/15/21 showed progressive, severe dysphagia with aspiration noted during and after the swallow. Pt was discharged from OP SLP due to limited expected progress.  - NPO, ice chips okay with risks communicated with patient  - Biotene oral spray    #Parkinson's  #L foot drop  Following with outpatient neurology, not on carbidopa-levodopa. Family interested in inpatient neurology second opinion. Findings on exam are consistent with Parkinsonism. Outpatient JERO scan has confirmed a presynaptic dopaminergic deficit bilaterally.   - Neurology consult 11/1   - f/u OP in 1 month with Dr. Hill   - Consider Sinemet trial 1-2 weeks after discharge     # BPH with urinary retention s/p TURP  # Bacteruria, asymptomatic   S/p TURP 8/9/21 for urinary retention. Post-operatively voided well with mild incontinence. UC 10/27 with ,000 Klebsiella, but in the absence of symptoms not treated. Most recently seen by urology 8/17/21 without acute concerns with plans to follow up in 3 months.   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 Follows closely with BMT     #Hx of Type II DM  Last A1C 5.5, diet controlled.       Diet: Adult Formula Drip Feeding: Continuous Osmolite 1.5; Gastrostomy; Goal Rate: 55; mL/hr; Medication - Feeding Tube Flush Frequency: At least 15-30 mL water before and after medication  administration and with tube clogging; Amount to Send (Nutrition...  NPO per Anesthesia Guidelines for Procedure/Surgery Except for: Ice Chips, NPO but receiving Tube Feeding, Other; Specify: Witnessed ice chips okay (shared risk decisio making with pt.), meds per GT.    DVT Prophylaxis: Warfarin, heparin gtt bridge  Dill Catheter: Not present  Central Lines: None  Code Status: Full Code      Disposition Plan   Expected discharge: 11/04/2021 1-2 days recommended to TBD once adequate pain management/ tolerating PO medications, antibiotic plan established and bowel function restored and patent g-tube..     The patient's care was discussed with the Attending Physician, Dr. Berkowitz.    Ángel Marie MD  [address]  Securely message with the Vocera Web Console (learn more here)  Text page via [employer] Paging/Directory    Please see sign in/sign out for up to date coverage information    Clinically Significant Risk Factors Present on Admission            # Severe Malnutrition, POA: based on Reduced intake;Subcutaneous fat loss;Muscle loss (10/31/21 1300)     ______________________________________________________________________    Interval History   NO acute exacerbations overnight. Patient is struglig with oral secretions and would like ice chips to help clear them. Discussed the risks and benefits. Also wondering what his home care needs will be and if her needs home care vs. Transitional care. Worried about his wife that has chronic low back pain.    Data reviewed today: I reviewed all medications, new labs and imaging results over the last 24 hours. I personally reviewed.    Physical Exam   Vital Signs: Temp: 98.1  F (36.7  C) Temp src: Oral BP: 117/62 Pulse: 81   Resp: 18 SpO2: 97 % O2 Device: None (Room air)    Weight: 145 lbs 1 oz  Gen: lying in bed, in NAD  CV: regular rate, well perfused  Pulm: no increased WOB on RA  GI: soft, ND, nontender to palpation  around G tube site. Dressing in place c/d/i.  Neuro: alert, conversant, responds to questions appropriately  Ext: no bilateral peripheral extremity edema, warm  Psych: anxious    Data   Recent Labs   Lab 11/02/21  1600 11/02/21  0632 11/01/21  0828 11/01/21  0506 10/31/21  1748 10/31/21  0759 10/31/21  0715 10/30/21  0638 10/30/21  0636   WBC  --   --   --  10.7  --   --  12.8*  --  16.3*   HGB  --   --   --  13.0*  --   --  13.9  --  14.5   MCV  --   --   --  94  --   --  94  --  92   PLT  --   --   --  99*  --   --  83*  --  95*   INR  --  1.93*  --  1.53*  --   --  1.48*   < >  --    NA  --  136  --  134  --   --  136   < > 136   POTASSIUM  --  3.5  --  3.8  --   --  4.0   < > 3.1*   CHLORIDE  --  101  --  99  --   --  100   < > 98   CO2  --  32  --  33*  --   --  31   < > 27   BUN  --  29  --  34*  --   --  35*   < > 30   CR  --  1.05  --  1.31*  --   --  1.34*   < > 1.21   ANIONGAP  --  3  --  2*  --   --  5   < > 11   GILBERT  --  8.4*  --  8.0*  --   --  8.3*   < > 8.6   * 132* 134* 124*   < >   < > 95   < > 140*   ALBUMIN  --   --   --   --   --   --  2.1*  --  2.3*   PROTTOTAL  --   --   --   --   --   --  6.2*  --  6.3*   BILITOTAL  --   --   --   --   --   --  1.1  --  0.7   ALKPHOS  --   --   --   --   --   --  62  --  69   ALT  --   --   --   --   --   --  7  --  9   AST  --   --   --   --   --   --  18  --  16    < > = values in this interval not displayed.     No results found for this or any previous visit (from the past 24 hour(s)).  Medications     dextrose       heparin 1,100 Units/hr (11/02/21 1826)     - MEDICATION INSTRUCTIONS -       Warfarin Therapy Reminder         acetaminophen  650 mg Per G Tube Q6H     artificial saliva  1-2 spray Swish & Spit 4x Daily     cholecalciferol  50 mcg Per G Tube Daily     [Held by provider] dutasteride  0.5 mg Oral Daily     flecainide  50 mg Per G Tube Q12H JEAN-CLAUDE     metoprolol tartrate  12.5 mg Per G Tube BID     multivitamins w/minerals  15 mL Per G Tube  Daily     pantoprazole  40 mg Per G Tube QAM AC     potassium & sodium phosphates  1 packet Oral TID     potassium chloride  40 mEq Oral Daily     rosuvastatin  40 mg Per G Tube At Bedtime     sodium chloride (PF)  3 mL Intracatheter Q8H     sodium fluoride dental gel   Dental Daily     warfarin ANTICOAGULANT  2.5 mg Oral ONCE at 18:00

## 2021-11-02 NOTE — PLAN OF CARE
Vitals: VSS RA  Blood glucose: NA  Pain/nausea: denies  Diet: NPO with Ice chips. TF at 35 ml GR 55. Increase  To 45 @ MN.   Lines: PIV R infusing hep 1100 unit(s)/hr. Next 10 A check 2100. PIV SL  : x 1.   GI: no BM, RN to request bowel meds  Drains: PEG to   Skin: incision sites abdomen DB SHIRLENE. PI cocyx mepilex covered.   Mobility: a x 1 , Gaitbelt x walker.     Bed alarm for nights due to fall risk.   7D RN was given report for transfer. Belongings are packed with his chart and meds.   Pt agreed to be transferred to 7 D.

## 2021-11-02 NOTE — PLAN OF CARE
"/74 (BP Location: Right arm)   Pulse 78   Temp 98.6  F (37  C) (Oral)   Resp 18   Ht 1.804 m (5' 11.02\")   Wt 65.8 kg (145 lb 1 oz)   SpO2 98%   BMI 20.22 kg/m      Assumed cares [id number]  Neuro: A/Ox4, slow but clear speech  Pain/Nausea: abdominal pain, given Tylenol x2; denies nausea  Cardiac: tele- a fib  Resp: lung sounds diminished, equal bilaterally  GI/: voiding spontaneously- has a hx of urinary incontinence but pt wants to try going w/o Primofit; no BM since prior to surgery, passing gas  Diet/Appetite: NPO w/ ice chips  Skin: blanchable redness on coccyx- Mepilex; abdominal incision + lap sites x3- dermabond, SHIRLENE  Access: PIV- hep gtt @1100; PIV- saline locked  Drains: PEG- TF @25 (goal of 55); increases to 35@ 1800  Activity: Ax1 + walker and gait belt; ambulated in hallway x1; up w/ OT  Plan:   Will continue with plan of care and notify team of any changes.?    Elizabeth Ghosh RN on 11/2/2021 at 3:05 PM      "

## 2021-11-02 NOTE — PLAN OF CARE
"/70 (BP Location: Right arm)   Pulse 73   Temp 98.1  F (36.7  C) (Oral)   Resp 18   Ht 1.804 m (5' 11.02\")   Wt 65.8 kg (145 lb 1 oz)   SpO2 95%   BMI 20.22 kg/m      2605-3451  Neuro: Pt. alert & Ox4  Behavior: Pt. pleasant & cooperative with cares.   Activity: Pt. up with 1 assist, walker & gait belt.  Vital: AVSS on RA.   LDAs: Heparin gtt at 950units/hour into left PIV. Right PIV saline locked. GT for tube feeds.   Cardiac: Pt. on tele & in A-fib., pt's baseline.   Respiratory: LS diminished in bases.  GI/: Pt. incontinent urine, external catheter in place. No stools this shift. Pt. passing gas.  Skin: Abdominal incisions with liquid bandage & c/d/I.  Pain/Nausea:  Abdominal pain controlled with sched. Tylenol. Pt. denies nausea.  Diet: NPO. Tube feeds at 15cc/hour into GT.  Labs/Imaging: Morning Heparin 10a:   Plan: Continue to follow POC & notify MD with change in status.     "

## 2021-11-02 NOTE — PROGRESS NOTES
Surgery Progress Note  11/02/2021       Subjective:  - KATIE overnight.  - Tolerating tube feeds, no nausea  - Passing flatus, no bowel movement  - Mild abdominal discomfort, mostly with movement, otherwise well controlled      Objective:  Temp:  [97.6  F (36.4  C)-98.3  F (36.8  C)] 98.1  F (36.7  C)  Pulse:  [73-82] 73  Resp:  [16-18] 18  BP: (121-136)/(66-76) 128/70  SpO2:  [93 %-97 %] 95 %    I/O last 3 completed shifts:  In: 407.5 [I.V.:62.5]  Out: 550 [Urine:350; Emesis/NG output:200]      Gen: Awake, alert, interactive, NAD  Resp: NLB on RA  Abd: soft, nondistended, nontender to palpation, G with tube feeds running, incisions c/d/i  Ext: WWP, no edema     Labs:  Recent Labs   Lab 11/01/21  0506 10/31/21  0715 10/30/21  0636   WBC 10.7 12.8* 16.3*   HGB 13.0* 13.9 14.5   PLT 99* 83* 95*       Recent Labs   Lab 11/01/21 2017 11/01/21  1728 11/01/21  0828 11/01/21  0506 10/31/21  1748 10/31/21  0759 10/31/21  0715 10/31/21  0405 10/30/21  1902 10/30/21  0844 10/30/21  0636   NA  --   --   --  134  --   --  136  --   --   --  136   POTASSIUM  --   --   --  3.8  --   --  4.0  --  3.9   < > 3.1*   CHLORIDE  --   --   --  99  --   --  100  --   --   --  98   CO2  --   --   --  33*  --   --  31  --   --   --  27   BUN  --   --   --  34*  --   --  35*  --   --   --  30   CR  --   --   --  1.31*  --   --  1.34*  --   --   --  1.21   GLC  --   --  134* 124* 96   < > 95   < >  --    < > 140*   GILBERT  --   --   --  8.0*  --   --  8.3*  --   --   --  8.6   MAG  --   --   --  2.2  --   --   --   --   --   --  1.4*   PHOS 1.9* 2.0*  --  2.2*  --   --   --   --   --   --   --     < > = values in this interval not displayed.       Imaging:  No new imaging     Assessment/Plan:   71 year old male with Parkinson's disease with dysphagia and chronic constipation, afib (on coumadin), myeloproliferative disorder s/p BMT who presented with abdominal pain and distension after PEG placement by IR on 10/25/2021. On CT he was found to have  distended stomach and small bowels suggestive of SBO with transition point at G tube which goes through small bowel. Now s/p diagnostic laparoscopy, small bowel resection, abdominal washout, insertion of G tube on 10/30.     - Passing flatus, okay to advance feeds as tolerated  - Okay for meds through G tube  - No need for abx from surgical perspective  - Appreciate ongoing cares by Medicine team     Seen, examined, and discussed with chief resident, who will discuss with staff.    Francine Lopez MD  PGY-2 General Surgery   (6AM-5PM please page primary team, nights/weekends/holidays page job code [phone])

## 2021-11-03 ENCOUNTER — APPOINTMENT (OUTPATIENT)
Dept: OCCUPATIONAL THERAPY | Facility: CLINIC | Age: 71
DRG: 326 | End: 2021-11-03
Payer: COMMERCIAL

## 2021-11-03 ENCOUNTER — APPOINTMENT (OUTPATIENT)
Dept: PHYSICAL THERAPY | Facility: CLINIC | Age: 71
DRG: 326 | End: 2021-11-03
Payer: COMMERCIAL

## 2021-11-03 LAB
ANION GAP SERPL CALCULATED.3IONS-SCNC: 1 MMOL/L (ref 3–14)
BUN SERPL-MCNC: 25 MG/DL (ref 7–30)
CALCIUM SERPL-MCNC: 8.3 MG/DL (ref 8.5–10.1)
CHLORIDE BLD-SCNC: 106 MMOL/L (ref 94–109)
CO2 SERPL-SCNC: 30 MMOL/L (ref 20–32)
CREAT SERPL-MCNC: 0.99 MG/DL (ref 0.66–1.25)
GFR SERPL CREATININE-BSD FRML MDRD: 75 ML/MIN/1.73M2
GLUCOSE BLD-MCNC: 213 MG/DL (ref 70–99)
INR PPP: 2.49 (ref 0.85–1.15)
MAGNESIUM SERPL-MCNC: 1.9 MG/DL (ref 1.6–2.3)
PHOSPHATE SERPL-MCNC: 2.4 MG/DL (ref 2.5–4.5)
POTASSIUM BLD-SCNC: 4.5 MMOL/L (ref 3.4–5.3)
SODIUM SERPL-SCNC: 137 MMOL/L (ref 133–144)
UFH PPP CHRO-ACNC: 0.46 IU/ML

## 2021-11-03 PROCEDURE — 85520 HEPARIN ASSAY: CPT | Performed by: PEDIATRICS

## 2021-11-03 PROCEDURE — 84100 ASSAY OF PHOSPHORUS: CPT | Performed by: STUDENT IN AN ORGANIZED HEALTH CARE EDUCATION/TRAINING PROGRAM

## 2021-11-03 PROCEDURE — 85610 PROTHROMBIN TIME: CPT | Performed by: STUDENT IN AN ORGANIZED HEALTH CARE EDUCATION/TRAINING PROGRAM

## 2021-11-03 PROCEDURE — 83735 ASSAY OF MAGNESIUM: CPT | Performed by: STUDENT IN AN ORGANIZED HEALTH CARE EDUCATION/TRAINING PROGRAM

## 2021-11-03 PROCEDURE — 99207 PR CDG-MDM COMPONENT: MEETS MODERATE - DOWN CODED: CPT | Performed by: INTERNAL MEDICINE

## 2021-11-03 PROCEDURE — 250N000011 HC RX IP 250 OP 636: Performed by: STUDENT IN AN ORGANIZED HEALTH CARE EDUCATION/TRAINING PROGRAM

## 2021-11-03 PROCEDURE — 97530 THERAPEUTIC ACTIVITIES: CPT | Mod: GO

## 2021-11-03 PROCEDURE — 36415 COLL VENOUS BLD VENIPUNCTURE: CPT | Performed by: STUDENT IN AN ORGANIZED HEALTH CARE EDUCATION/TRAINING PROGRAM

## 2021-11-03 PROCEDURE — 250N000013 HC RX MED GY IP 250 OP 250 PS 637: Performed by: HOSPITALIST

## 2021-11-03 PROCEDURE — 97116 GAIT TRAINING THERAPY: CPT | Mod: GP

## 2021-11-03 PROCEDURE — 97110 THERAPEUTIC EXERCISES: CPT | Mod: GP

## 2021-11-03 PROCEDURE — 97535 SELF CARE MNGMENT TRAINING: CPT | Mod: GO

## 2021-11-03 PROCEDURE — 250N000013 HC RX MED GY IP 250 OP 250 PS 637: Performed by: STUDENT IN AN ORGANIZED HEALTH CARE EDUCATION/TRAINING PROGRAM

## 2021-11-03 PROCEDURE — 36415 COLL VENOUS BLD VENIPUNCTURE: CPT | Performed by: PEDIATRICS

## 2021-11-03 PROCEDURE — 97530 THERAPEUTIC ACTIVITIES: CPT | Mod: GP

## 2021-11-03 PROCEDURE — 82565 ASSAY OF CREATININE: CPT | Performed by: STUDENT IN AN ORGANIZED HEALTH CARE EDUCATION/TRAINING PROGRAM

## 2021-11-03 PROCEDURE — 99232 SBSQ HOSP IP/OBS MODERATE 35: CPT | Mod: GC | Performed by: INTERNAL MEDICINE

## 2021-11-03 PROCEDURE — 250N000013 HC RX MED GY IP 250 OP 250 PS 637: Performed by: INTERNAL MEDICINE

## 2021-11-03 PROCEDURE — 82374 ASSAY BLOOD CARBON DIOXIDE: CPT | Performed by: STUDENT IN AN ORGANIZED HEALTH CARE EDUCATION/TRAINING PROGRAM

## 2021-11-03 PROCEDURE — 250N000013 HC RX MED GY IP 250 OP 250 PS 637

## 2021-11-03 PROCEDURE — 120N000002 HC R&B MED SURG/OB UMMC

## 2021-11-03 RX ORDER — WARFARIN SODIUM 2.5 MG/1
2.5 TABLET ORAL
Status: COMPLETED | OUTPATIENT
Start: 2021-11-03 | End: 2021-11-03

## 2021-11-03 RX ORDER — LORATADINE 10 MG/1
10 TABLET ORAL DAILY PRN
Status: DISCONTINUED | OUTPATIENT
Start: 2021-11-03 | End: 2021-11-04 | Stop reason: HOSPADM

## 2021-11-03 RX ADMIN — POTASSIUM & SODIUM PHOSPHATES POWDER PACK 280-160-250 MG 1 PACKET: 280-160-250 PACK at 08:43

## 2021-11-03 RX ADMIN — SODIUM FLUORIDE: 5 GEL DENTAL at 22:16

## 2021-11-03 RX ADMIN — Medication 12.5 MG: at 08:42

## 2021-11-03 RX ADMIN — POTASSIUM & SODIUM PHOSPHATES POWDER PACK 280-160-250 MG 1 PACKET: 280-160-250 PACK at 20:32

## 2021-11-03 RX ADMIN — Medication 40 MG: at 08:42

## 2021-11-03 RX ADMIN — Medication 12.5 MG: at 20:26

## 2021-11-03 RX ADMIN — Medication 15 ML: at 08:42

## 2021-11-03 RX ADMIN — POTASSIUM CHLORIDE 40 MEQ: 40 SOLUTION ORAL at 08:42

## 2021-11-03 RX ADMIN — ROSUVASTATIN 40 MG: 10 TABLET, FILM COATED ORAL at 22:15

## 2021-11-03 RX ADMIN — Medication 2 SPRAY: at 12:34

## 2021-11-03 RX ADMIN — LORATADINE 10 MG: 10 TABLET ORAL at 12:34

## 2021-11-03 RX ADMIN — FLECAINIDE ACETATE 50 MG: 50 TABLET ORAL at 08:43

## 2021-11-03 RX ADMIN — Medication 50 MCG: at 08:42

## 2021-11-03 RX ADMIN — WARFARIN SODIUM 2.5 MG: 2.5 TABLET ORAL at 17:13

## 2021-11-03 RX ADMIN — ACETAMINOPHEN 650 MG: 325 SOLUTION ORAL at 06:13

## 2021-11-03 RX ADMIN — ACETAMINOPHEN 650 MG: 325 SOLUTION ORAL at 12:34

## 2021-11-03 RX ADMIN — HEPARIN SODIUM 1100 UNITS/HR: 10000 INJECTION, SOLUTION INTRAVENOUS at 00:46

## 2021-11-03 RX ADMIN — FLECAINIDE ACETATE 50 MG: 50 TABLET ORAL at 20:26

## 2021-11-03 RX ADMIN — POTASSIUM & SODIUM PHOSPHATES POWDER PACK 280-160-250 MG 1 PACKET: 280-160-250 PACK at 15:17

## 2021-11-03 RX ADMIN — ACETAMINOPHEN 650 MG: 325 SOLUTION ORAL at 00:38

## 2021-11-03 RX ADMIN — ACETAMINOPHEN 650 MG: 325 SOLUTION ORAL at 17:12

## 2021-11-03 ASSESSMENT — ACTIVITIES OF DAILY LIVING (ADL)
ADLS_ACUITY_SCORE: 17
ADLS_ACUITY_SCORE: 13
ADLS_ACUITY_SCORE: 13
ADLS_ACUITY_SCORE: 17
ADLS_ACUITY_SCORE: 13
ADLS_ACUITY_SCORE: 17
ADLS_ACUITY_SCORE: 13
ADLS_ACUITY_SCORE: 17
ADLS_ACUITY_SCORE: 13
ADLS_ACUITY_SCORE: 17
ADLS_ACUITY_SCORE: 13

## 2021-11-03 ASSESSMENT — MIFFLIN-ST. JEOR: SCORE: 1392.57

## 2021-11-03 NOTE — PLAN OF CARE
Pt afebrile with stable vs. INR therapeutic, heparin drip d/c'd. Tolerated TF at goal (55cc/hr) for 8 hours. TF now on hold (1400) for 2 hours and then bolus feeding will begin (per dietician) at 1600. Continent of urine with frequent toileting. BM x1. ABD lap incisions c/d/I, GT dressing changed. Walking in halls x2 with walker and SBA. Phos level 2.4 being replaced via GT, redraw tomorrow.

## 2021-11-03 NOTE — PLAN OF CARE
9050-5716:     Temp: 97.6 (oral)  BP: 133/67  Pulse: 68  Resp Rate: 16  SpO2: 93% (room air)    NEURO: Intact, A&O x4.      RESPIRATORY:  WDL. Pt denies having any SOB.     CARDIO: WDL.     GI/:  Pt requested we remove primafit external catheter d/t it being uncomfortable. Pt was voiding spontaneously with AUOP and continent. Pt has not had BM since surgery (10/30/21). Denies having any N/V. Tube feeds increased to goal rate 55ml/hr, and pt is tolerating them thus far.      SKIN: Abdominal incisions - open to air, closed with liquid glue; Buttock wound - wound care completed per POC.      ACTIVITY: assist of 1 with gait belt and walker. Bed alarm on for pt safety.      PAIN: Pt denies having any pain this shift.      LDA: PIV (right forearm) - infusing heparin @ 1100unit/hr (11ml/hr). PIV (right wrist) - saline locked. G-tube - infusing tube feeding at 55ml/hr.

## 2021-11-03 NOTE — PROGRESS NOTES
CLINICAL NUTRITION SERVICES - BRIEF NOTE     Nutrition Prescription    Recommendations already ordered by Registered Dietitian (RD):  Stop continuous TF for 2 hrs to let stomach empty prior to starting gravity feeding. Provide 1/2 can Osmolite 1.5 x 2 feedings (separate 3-4 hrs apart). If tolerated, increase vol to 1 can x 2 feedings and then increase to goal of 2 cans x 3 feedings per day.  Flush with 120 mL of H20 before and after each feeding. Do not give bolus feedings at night while pt asleep (during the day only).    Feeding will provide 1422mL, 2133kcal (33kcal/kg), 89g protein (1.4g/kg) and 1800mL (1080mL + 720mL) water (28mL/kg) per day.    Future/Additional Recommendations:  Continue to monitor Phos  Continue to monitor TF tolerance/transition to bolus     Consult was requested for transition to bolus feedings.    Nutrition Support: G-tube; Current regimen is continuous Osmolite 1.5 @ 55mL/hr + 15-30mL free water before and after medication and with clogging. Pt reached goal rate this morning at 6am. Pt has been tolerating feeds.     UPDATED Dosing Weight: 65 kg (actual) - based on pt reported wt of 142lbs    ASSESSED NUTRITION NEEDS:  Estimated Energy Needs: 4154-8246 kcals/day (30-35+ kcals/kg)  Justification: Repletion  Estimated Protein Needs: 78-98 g/day (1.2 - 1.5 g/kg)  Justification: Hypercatabolism with acute illness    INTERVENTIONS  Implementation  Discussed current TF regimen with patient at visit. He is on board with plan to transition to bolus feeds once current rate has been tolerated for at least 12 hours.   Enteral Nutrition - Modify composition, concentration, rate, schedule and volume    [place] Kitchen  Dietetic Intern

## 2021-11-03 NOTE — PROGRESS NOTES
Surgery Progress Note  11/03/2021       Subjective:  - KATIE overnight.  - Feels good this am   - TF at goal   - Passing gas, no BM    Objective:  Temp:  [96.1  F (35.6  C)-98.8  F (37.1  C)] 96.1  F (35.6  C)  Pulse:  [76-91] 83  Resp:  [16-18] 18  BP: (115-130)/(62-97) 115/68  SpO2:  [95 %-98 %] 97 %    I/O last 3 completed shifts:  In: 760 [NG/GT:120]  Out: -       Gen: Awake, alert, interactive, NAD  Resp: NLB on RA  Abd: soft, nondistended, nontender to palpation, G with tube feeds running, incisions c/d/i  Ext: WWP, no edema     Labs:  Recent Labs   Lab 11/01/21  0506 10/31/21  0715 10/30/21  0636   WBC 10.7 12.8* 16.3*   HGB 13.0* 13.9 14.5   PLT 99* 83* 95*       Recent Labs   Lab 11/02/21  1600 11/02/21  0632 11/01/21 2017 11/01/21  1728 11/01/21  0828 11/01/21  0506 11/01/21  0506 10/31/21  1748 10/31/21  0759 10/31/21  0715 10/30/21  0844 10/30/21  0636   NA  --  136  --   --   --   --  134  --   --  136   < > 136   POTASSIUM  --  3.5  --   --   --   --  3.8  --   --  4.0   < > 3.1*   CHLORIDE  --  101  --   --   --   --  99  --   --  100   < > 98   CO2  --  32  --   --   --   --  33*  --   --  31   < > 27   BUN  --  29  --   --   --   --  34*  --   --  35*   < > 30   CR  --  1.05  --   --   --   --  1.31*  --   --  1.34*   < > 1.21   * 132*  --   --  134*  --  124*   < >   < > 95   < > 140*   GILBERT  --  8.4*  --   --   --   --  8.0*  --   --  8.3*   < > 8.6   MAG  --  2.1  --   --   --   --  2.2  --   --   --   --  1.4*   PHOS  --  2.1* 1.9* 2.0*  --    < > 2.2*  --   --   --   --   --     < > = values in this interval not displayed.       Imaging:  No new imaging     Assessment/Plan:   71 year old male with Parkinson's disease with dysphagia and chronic constipation, afib (on coumadin), myeloproliferative disorder s/p BMT who presented with abdominal pain and distension after PEG placement by IR on 10/25/2021. On CT he was found to have distended stomach and small bowels suggestive of SBO with  transition point at G tube which goes through small bowel. Now s/p diagnostic laparoscopy, small bowel resection, abdominal washout, insertion of G tube on 10/30.     - Tube feeds at goal  - SLP recommends NPO due to dysphagia  - Okay for meds through G tube  - No need for abx from surgical perspective    Appreciate ongoing cares by Medicine team. General surgery team will sign off. Please call with any questions.      Seen, examined, and discussed with chief resident, who will discuss with staff.    Betzy Russo MD  PGY-1 General Surgery

## 2021-11-03 NOTE — PLAN OF CARE
Assumed cares at [phone]    Admitted from 7A  Neuro: AOx4,able to make needs known  Vitals: VSS on RA  Diet: NPO w/ ice chips  Cardiac: Denies chest pain  Resp: lung sounds diminished, equal bilaterally  GI/: voiding spontaneously- has a hx of urinary incontinence, on urethral catheter; BS +, passing gas, no BM this shift  Skin: Mepilex on coccyx due to blanchable redness; abdominal incision + lap sites x3- dermabond, SHIRLENE, no other open areas noted  Access: PIV- hep gtt @1100, Next 10A check felicitas am; PIV- saline locked  Drains: PEG- TF @ 35 (goal of 55); increases to 45 @ 0000  Pain/Nausea: Denies pain and nausea  Activity: Ax1 + walker and gait belt    Plan: Continue with plan of care.

## 2021-11-04 ENCOUNTER — DOCUMENTATION ONLY (OUTPATIENT)
Dept: ANTICOAGULATION | Facility: CLINIC | Age: 71
End: 2021-11-04

## 2021-11-04 ENCOUNTER — HOME INFUSION (PRE-WILLOW HOME INFUSION) (OUTPATIENT)
Dept: PHARMACY | Facility: CLINIC | Age: 71
End: 2021-11-04
Payer: COMMERCIAL

## 2021-11-04 ENCOUNTER — MEDICAL CORRESPONDENCE (OUTPATIENT)
Dept: PHARMACY | Facility: CLINIC | Age: 71
End: 2021-11-04
Payer: COMMERCIAL

## 2021-11-04 VITALS
HEART RATE: 77 BPM | BODY MASS INDEX: 19.29 KG/M2 | HEIGHT: 71 IN | TEMPERATURE: 97.5 F | OXYGEN SATURATION: 98 % | RESPIRATION RATE: 18 BRPM | DIASTOLIC BLOOD PRESSURE: 71 MMHG | WEIGHT: 137.8 LBS | SYSTOLIC BLOOD PRESSURE: 135 MMHG

## 2021-11-04 DIAGNOSIS — I48.0 PAROXYSMAL ATRIAL FIBRILLATION (H): ICD-10-CM

## 2021-11-04 DIAGNOSIS — I48.91 ATRIAL FIBRILLATION (H): ICD-10-CM

## 2021-11-04 DIAGNOSIS — Z79.01 LONG TERM CURRENT USE OF ANTICOAGULANT THERAPY: Primary | ICD-10-CM

## 2021-11-04 LAB
ANION GAP SERPL CALCULATED.3IONS-SCNC: 3 MMOL/L (ref 3–14)
BUN SERPL-MCNC: 31 MG/DL (ref 7–30)
CALCIUM SERPL-MCNC: 7.8 MG/DL (ref 8.5–10.1)
CHLORIDE BLD-SCNC: 106 MMOL/L (ref 94–109)
CO2 SERPL-SCNC: 30 MMOL/L (ref 20–32)
CREAT SERPL-MCNC: 0.97 MG/DL (ref 0.66–1.25)
GFR SERPL CREATININE-BSD FRML MDRD: 77 ML/MIN/1.73M2
GLUCOSE BLD-MCNC: 160 MG/DL (ref 70–99)
HOLD SPECIMEN: NORMAL
INR PPP: 3.95 (ref 0.85–1.15)
MAGNESIUM SERPL-MCNC: 1.8 MG/DL (ref 1.6–2.3)
PHOSPHATE SERPL-MCNC: 2.7 MG/DL (ref 2.5–4.5)
POTASSIUM BLD-SCNC: 4.5 MMOL/L (ref 3.4–5.3)
SODIUM SERPL-SCNC: 139 MMOL/L (ref 133–144)

## 2021-11-04 PROCEDURE — 250N000013 HC RX MED GY IP 250 OP 250 PS 637

## 2021-11-04 PROCEDURE — 250N000013 HC RX MED GY IP 250 OP 250 PS 637: Performed by: HOSPITALIST

## 2021-11-04 PROCEDURE — 250N000013 HC RX MED GY IP 250 OP 250 PS 637: Performed by: STUDENT IN AN ORGANIZED HEALTH CARE EDUCATION/TRAINING PROGRAM

## 2021-11-04 PROCEDURE — 84100 ASSAY OF PHOSPHORUS: CPT | Performed by: STUDENT IN AN ORGANIZED HEALTH CARE EDUCATION/TRAINING PROGRAM

## 2021-11-04 PROCEDURE — 36415 COLL VENOUS BLD VENIPUNCTURE: CPT | Performed by: STUDENT IN AN ORGANIZED HEALTH CARE EDUCATION/TRAINING PROGRAM

## 2021-11-04 PROCEDURE — 85610 PROTHROMBIN TIME: CPT | Performed by: STUDENT IN AN ORGANIZED HEALTH CARE EDUCATION/TRAINING PROGRAM

## 2021-11-04 PROCEDURE — 80048 BASIC METABOLIC PNL TOTAL CA: CPT | Performed by: STUDENT IN AN ORGANIZED HEALTH CARE EDUCATION/TRAINING PROGRAM

## 2021-11-04 PROCEDURE — 83735 ASSAY OF MAGNESIUM: CPT | Performed by: STUDENT IN AN ORGANIZED HEALTH CARE EDUCATION/TRAINING PROGRAM

## 2021-11-04 RX ORDER — SALIVA STIMULANT COMB. NO.3
1-2 SPRAY, NON-AEROSOL (ML) MUCOUS MEMBRANE 4 TIMES DAILY
Qty: 240 ML | Refills: 0 | Status: SHIPPED | OUTPATIENT
Start: 2021-11-04 | End: 2021-12-10

## 2021-11-04 RX ORDER — WARFARIN SODIUM 2.5 MG/1
TABLET ORAL
Qty: 90 TABLET | Refills: 3 | COMMUNITY
Start: 2021-11-05 | End: 2021-11-04

## 2021-11-04 RX ORDER — WARFARIN SODIUM 2.5 MG/1
TABLET ORAL
Qty: 90 TABLET | Refills: 3 | COMMUNITY
Start: 2021-11-05 | End: 2022-01-21

## 2021-11-04 RX ORDER — CARBOXYMETHYLCELLULOSE SODIUM 5 MG/ML
2 SOLUTION/ DROPS OPHTHALMIC
Qty: 30 EACH | Refills: 0 | Status: SHIPPED | OUTPATIENT
Start: 2021-11-04 | End: 2021-11-12

## 2021-11-04 RX ADMIN — Medication 2 SPRAY: at 11:22

## 2021-11-04 RX ADMIN — POTASSIUM & SODIUM PHOSPHATES POWDER PACK 280-160-250 MG 1 PACKET: 280-160-250 PACK at 08:26

## 2021-11-04 RX ADMIN — ACETAMINOPHEN 650 MG: 325 SOLUTION ORAL at 00:59

## 2021-11-04 RX ADMIN — Medication 50 MCG: at 10:17

## 2021-11-04 RX ADMIN — FLECAINIDE ACETATE 50 MG: 50 TABLET ORAL at 08:27

## 2021-11-04 RX ADMIN — ACETAMINOPHEN 650 MG: 325 SOLUTION ORAL at 11:22

## 2021-11-04 RX ADMIN — Medication 40 MG: at 08:27

## 2021-11-04 RX ADMIN — Medication 15 ML: at 08:27

## 2021-11-04 RX ADMIN — Medication 12.5 MG: at 08:27

## 2021-11-04 RX ADMIN — Medication 2 SPRAY: at 08:27

## 2021-11-04 RX ADMIN — POTASSIUM CHLORIDE 40 MEQ: 40 SOLUTION ORAL at 08:26

## 2021-11-04 RX ADMIN — ACETAMINOPHEN 650 MG: 325 SOLUTION ORAL at 06:05

## 2021-11-04 ASSESSMENT — ACTIVITIES OF DAILY LIVING (ADL)
ADLS_ACUITY_SCORE: 17

## 2021-11-04 ASSESSMENT — MIFFLIN-ST. JEOR: SCORE: 1392.57

## 2021-11-04 NOTE — PLAN OF CARE
[id number]: Pt A&Ox4, afebrile, VSS. Pt denies any pain/N/V. Started 120 mL osmolite can feeds according to initiation ordered x2 (240 mL total this shift), pt tolerating well. PIV flushed and saline locked. Pt not incontinent this shift, uses call light appropriately for scheduled bathroom attempts. Pt had loose stool this PM with no associated discomfort. Continue to monitor and with POC.

## 2021-11-04 NOTE — PROGRESS NOTES
Home Infusion  Haresh is discharging today and will be going home on bolus enteral feeds.  He was on feeds briefly at home prior to admission.  Met with patient at bedside to review plans for home enteral feeds.  Patient was receiving Isosource 1.5 6cartons/day bolus feeds at home.  Currently patient has orders for Osmolite 1.5 6cartons/day.  Patient inquiring which formula he should be using at discharge.  Spoke with unit RD Jacquie who states fiber-free formula (Osmolite) would be more appropriate for patient at this time.  Patient may be able to go back to formula with fiber (Isosource) at a later date.  Informed patient of RD recommendation.  Patient verbalized understanding and states he is able to administer the feeds independently at home.  [facility] will plan to deliver new Osmolite formula to home this afternoon.  Patient will keep his Isosource supply on hand for now in case he is able to switch back to this formula later.  Plan for [facility] home care to provide home RN and PT services after discharge.  Patient is ready for discharge from [facility] perspective.    KOSTAS Rizzo  [facility] Nurse Liaison   [email or web address]  Cell: [phone] M-F  [facility] Main: [phone]

## 2021-11-04 NOTE — PLAN OF CARE
Physical Therapy Discharge Summary    Reason for therapy discharge:    Discharged to home with home therapy.    Progress towards therapy goal(s). See goals on Care Plan in [facility] electronic health record for goal details.  Goals partially met.  Barriers to achieving goals:   discharge from facility.    Therapy recommendation(s):    Continued therapy is recommended.  Rationale/Recommendations:  Pt would benefit from additional skilled PT to address functional mobility,balance and gait.

## 2021-11-04 NOTE — PROGRESS NOTES
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    #Small bowel obstruction  #Trans-enteric course of the G tube through small bowel  #S/P g-tube correction and small bowel resection  Presented with abdominal pain, vomiting found on imaging to have SBO caused by his G-tube with a trans-enteric path. S/p diagnostic laparoscopy, small bowel resection, abdominal washout, and insertion of G tube on 10/30. Awaiting return of bowel function, passing flatus. IR discussed case with family 10/30. Initially on Zosyn due to concern for intraabdominal infection, discontinued post-operatively.  - Surgery following   - Meds via G tube  - TF at goal, plan to start gravity bolus feeding   - NPO due to dysphagia  - PT/OT  - Family interested in discussion with patient relations, provided contact information    #Paroxysmal atrial fibrillation  #Hx of DVT  #Antiphospholipid antibody syndrome  CHADS2-VASC score is 2 (age, HTN). Well controlled at home. Switch from Metoprolol succinate 25 to Metoprolol tartrate 12.5mg BID for compatibility with G-tube 10/30. Will resume home regimen now okay for meds via G tube. Anticoagulation held for operation, restarted 10/31 with plan for heparin bridge to  warfarin (goal INR 2-3).  - Cardiology signed off  - Continue PTA flecainide 50 mg BID  - Continue PTA metoprolol succinate 25 mg qd  - Stop heparin gtt, INR at goal  - Continue warfarin, appreciate pharm dosing (goal INR 2-3)  - Daily INR    #Dysphagia  In setting of Parkinson's disease. Per SLP review, most recent VFSS completed as an OP on 9/15/21 showed progressive, severe dysphagia with aspiration noted during and after the swallow. Pt was discharged from OP SLP due to limited expected progress.  - NPO, ice chips okay with risks communicated with patient  - Biotene oral spray  - PTA Claritin    #Parkinson's  #L foot drop  Following with outpatient neurology, not on carbidopa-levodopa. Family interested in inpatient neurology second opinion. Findings on exam are consistent with Parkinsonism. Outpatient JERO scan has confirmed a presynaptic dopaminergic deficit bilaterally.   - Neurology consult 11/1   - f/u OP in 1 month with Dr. Hill   - Consider Sinemet trial 1-2 weeks after discharge     # BPH with urinary retention s/p TURP  # Bacteruria, asymptomatic   S/p TURP 8/9/21 for urinary retention. Post-operatively voided well with mild incontinence. UC 10/27 with ,000 Klebsiella, but in the absence of symptoms not treated. Most recently seen by urology 8/17/21 without acute concerns with plans to follow up in 3 months.   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 Follows closely with BMT     #Hx of Type II DM  Last A1C 5.5, diet controlled.       Diet: Adult Formula Drip Feeding: Continuous Osmolite 1.5; Gastrostomy; Goal Rate: 55; mL/hr; Medication - Feeding Tube Flush Frequency: At least 15-30 mL water before and after medication administration and with tube clogging; Amount to Send  (Nutrition...  NPO per Anesthesia Guidelines for Procedure/Surgery Except for: Ice Chips, NPO but receiving Tube Feeding, Other; Specify: Witnessed ice chips okay (shared risk decisio making with pt.), meds per GT.  Adult Formula Bolus Feeding: TID Osmolite 1.5; Route: Gastrostomy; 6; Can(s); Medication - Feeding Tube Flush Frequency: At least 15-30 mL water before and after medication administration and with tube clogging; Amount to Send (Nutrition use): Per...    DVT Prophylaxis: Warfarin  Dill Catheter: Not present  Central Lines: None  Code Status: Full Code      Disposition Plan   Expected discharge: 11/04/2021 1-2 days recommended to prior living arrangement once TF at home regimen, home care set up.     The patient's care was discussed with the Attending Physician, Dr. Jose Leon MD  [address]  Securely message with the Vocera Web Console (learn more here)  Text page via [employer] Paging/Directory    Please see sign in/sign out for up to date coverage information    Clinically Significant Risk Factors Present on Admission            # Severe Malnutrition, POA: based on Reduced intake;Subcutaneous fat loss;Muscle loss (10/31/21 1300)     ______________________________________________________________________    Interval History   No acute events overnight. Has been passing flatus, though no today so far. Has not passed stool. Tolerated advancing TF to goal yesterday. No n/v.     Data reviewed today: I reviewed all medications, new labs and imaging results over the last 24 hours. I personally reviewed.    Physical Exam   Vital Signs: Temp: 98.4  F (36.9  C) Temp src: Oral BP: 117/67 Pulse: 81   Resp: 16 SpO2: 95 % O2 Device: None (Room air)    Weight: 137 lbs 12.8 oz  Gen: lying in bed, in NAD  CV: regular rate, well perfused  Pulm: no increased WOB on RA  GI: soft, ND, nontender to palpation around G tube site. Dressing in place  c/d/i.  Neuro: alert, conversant, responds to questions appropriately  Ext: no bilateral peripheral extremity edema, warm  Psych: anxious    Data   Recent Labs   Lab 11/03/21  0943 11/03/21  0612 11/02/21  1600 11/02/21  0632 11/01/21  0828 11/01/21  0506 10/31/21  0759 10/31/21  0715 10/30/21  0638 10/30/21  0636   WBC  --   --   --   --   --  10.7  --  12.8*  --  16.3*   HGB  --   --   --   --   --  13.0*  --  13.9  --  14.5   MCV  --   --   --   --   --  94  --  94  --  92   PLT  --   --   --   --   --  99*  --  83*  --  95*   INR  --  2.49*  --  1.93*  --  1.53*   < > 1.48*   < >  --      --   --  136  --  134   < > 136   < > 136   POTASSIUM 4.5  --   --  3.5  --  3.8   < > 4.0   < > 3.1*   CHLORIDE 106  --   --  101  --  99   < > 100   < > 98   CO2 30  --   --  32  --  33*   < > 31   < > 27   BUN 25  --   --  29  --  34*   < > 35*   < > 30   CR 0.99  --   --  1.05  --  1.31*   < > 1.34*   < > 1.21   ANIONGAP 1*  --   --  3  --  2*   < > 5   < > 11   GILBERT 8.3*  --   --  8.4*  --  8.0*   < > 8.3*   < > 8.6   *  --  164* 132*   < > 124*   < > 95   < > 140*   ALBUMIN  --   --   --   --   --   --   --  2.1*  --  2.3*   PROTTOTAL  --   --   --   --   --   --   --  6.2*  --  6.3*   BILITOTAL  --   --   --   --   --   --   --  1.1  --  0.7   ALKPHOS  --   --   --   --   --   --   --  62  --  69   ALT  --   --   --   --   --   --   --  7  --  9   AST  --   --   --   --   --   --   --  18  --  16    < > = values in this interval not displayed.     No results found for this or any previous visit (from the past 24 hour(s)).  Medications     dextrose       - MEDICATION INSTRUCTIONS -       Warfarin Therapy Reminder         acetaminophen  650 mg Per G Tube Q6H     artificial saliva  1-2 spray Swish & Spit 4x Daily     cholecalciferol  50 mcg Per G Tube Daily     [Held by provider] dutasteride  0.5 mg Oral Daily     flecainide  50 mg Per G Tube Q12H JEAN-CLAUDE     metoprolol tartrate  12.5 mg Per G Tube BID      multivitamins w/minerals  15 mL Per G Tube Daily     pantoprazole  40 mg Per G Tube QAM AC     potassium & sodium phosphates  1 packet Oral TID     potassium chloride  40 mEq Oral Daily     rosuvastatin  40 mg Per G Tube At Bedtime     sodium chloride (PF)  3 mL Intracatheter Q8H     sodium fluoride dental gel   Dental Daily

## 2021-11-04 NOTE — PROGRESS NOTES
Care Management Discharge Note    Discharge Date: 11/04/2021     Discharge Disposition: Home    Discharge Services: Home Infusion, Home Care    Discharge DME: Enteral nutrition     Discharge Transportation: Car, family or friend will provide    Private pay costs discussed: Not applicable    PAS Confirmation Code: N/A  Patient/family educated on Medicare website which has current facility and service quality ratings: Yes    Education Provided on the Discharge Plan: Yes  Persons Notified of Discharge Plans: Patient, bedside RN, SW, [facility], [facility]  Patient/Family in Agreement with the Plan: Yes    Handoff Referral Completed: Yes    Additional Information:    Per team, patient is stable to discharge home today.     Per RD, formula has changed to TID Osmolite 1.5, 6 cans daily.    Writer reached out to [facility] liaison. They will delvier formula and supplies to patient's home this evening. [facility] (Phn: [phone]) has agreed to take patient for RN and PT services.     AVS updated.     Lizbeth Ang, RN, BSN, PHN  Care Coordinator   P: [phone], [facility]

## 2021-11-04 NOTE — PHARMACY-ANTICOAGULATION SERVICE
Clinical Pharmacy- Warfarin Discharge Note  This patient is currently on warfarin for the treatment of DVT/PE prophylaxis;  Atrial fibrillation.    INR Goal=  2-3   Expected length of therapy lifetime.    Warfarin PTA Regimen: 3.75 mg every Friday, 2.5 mg all other days      Anticoagulation Dose History     Recent Dosing and Labs Latest Ref Rng & Units 10/30/2021 10/30/2021 10/31/2021 11/1/2021 11/2/2021 11/3/2021 11/4/2021    Warfarin 2.5 mg - - - 2.5 mg 2.5 mg 2.5 mg 2.5 mg -    Warfarin 5 mg - - - 5 mg - - - -    FACTOR 2 ASSAY 60 - 140 % - - - - - - -    INR 0.85 - 1.15 1.32(H) 1.32(H) 1.48(H) 1.53(H) 1.93(H) 2.49(H) 3.95(H)          Vitamin K doses administered during the last 7 days: 10mg IV vit K on 10/29  FFP administered during the last 7 days: none    Recommend discharging the patient on a warfarin regimen of HOLD dose today 11/4, and recheck INR on 11/5 with further doses per outpatient warfarin clinic with a prescription for warfarin 2.5mg tablets if refill is needed.      The patient should have an INR checked 11/5/21.    Ruth Christopher RPH on 11/4/2021 at 10:22 AM

## 2021-11-04 NOTE — PLAN OF CARE
Discharge  D: Pt afebrile, vital signs stable, Up ad binh, Reports comfort. GT revised, ABD incisions c/d/I. Tolerating tube feedings. Orders for discharge and outpatient medications written.  I: Home medications and return to clinic schedule reviewed with patient. Discharge instructions and parameters for calling Health Care Provider reviewed. Patient left at 1320 accompanied by son.   A: Patient/family verbalized understanding and was ready for discharge.   P: Patient declined to  medications in Pharmacy because he already had suppy of tho. Follow up as scheduled tomorrow for INR check.

## 2021-11-04 NOTE — PLAN OF CARE
[id number]:     Temp: 98.8 (oral)  BP: 116/61  Pulse: 79  Resp Rate: 16  SpO2: 98% (room air)     NEURO: Intact, A&O x4.      RESPIRATORY:  WDL. Pt denies having any SOB.     CARDIO: WDL      GI/:  Pt had 2 loose stools this shift. Voiding spontaneously with AUOP. G tube in place, 240ml feedings by gravity x2 - pt tolerated feedings well. Denies having any N/V. Pt still NPO with ok for ice chips.      SKIN:  G tube insertion site: dressing CDI; Abdominal incisions: WDL. Open to air.      ACTIVITY: Assist of 1 with gait belt and walker. Bed alarm on for pt safety.      PAIN: Pt denies having any pain this shift.      LDA: PIV x2 (right forearm) - saline locked

## 2021-11-04 NOTE — PROGRESS NOTES
ANTICOAGULATION  MANAGEMENT: Discharge Review    Haresh Rock chart reviewed for anticoagulation continuity of care    Hospital Admission on 10/29/2021-11/04/2021 for SBO, s/p g-tube correction and small bowel resection.    Discharge disposition: Home with home infusion    Results:    Recent labs: (last 7 days)     10/29/21  1705 10/29/21  2258 10/30/21  0638 10/30/21  1418 10/31/21  0715 10/31/21  1423 10/31/21  2217 11/01/21  0506 11/01/21  1220 11/02/21  0632 11/02/21  1412 11/02/21  2103 11/03/21  0612 11/04/21  0604   INR 2.82* 1.44* 1.32* 1.32* 1.48*  --   --  1.53*  --  1.93*  --   --  2.49* 3.95*   AAUFH  --   --   --   --  <0.10 0.76 0.17 0.26 0.30 0.18 0.50 0.40 0.46  --      Anticoagulation inpatient management:     held warfarin due to G-tube procedure  and resumed on 10/31 with 7.5mg boost.    Anticoagulation discharge instructions:     Warfarin dosing: hold warfarin until INR on 11/05   Bridging: No   INR goal change: No      Medication changes affecting anticoagulation: No    Additional factors affecting anticoagulation: Yes: Pt starting tube feeds. Changes in diet can cause INR to fluctuate.    Plan     Agree with discharge plan for follow up on 11/05/2021 with home meter and holding dose lachelle    Spoke with Haresh who is aware of this plan.     Anticoagulation Calendar updated    Hari Saavedra RN

## 2021-11-04 NOTE — PLAN OF CARE
Occupational Therapy Discharge Summary    Reason for therapy discharge:    Discharged to home.    Progress towards therapy goal(s). See goals on Care Plan [facility] record for goal details.  Goals partially met.  Barriers to achieving goals:   discharge from facility.    Therapy recommendation(s):    No further therapy is recommended. Recommend pt receive assist from family for heavier IADLs to adhere to post-surgical abdominal precautions.

## 2021-11-05 ENCOUNTER — PATIENT OUTREACH (OUTPATIENT)
Dept: NURSING | Facility: CLINIC | Age: 71
End: 2021-11-05
Attending: INTERNAL MEDICINE
Payer: COMMERCIAL

## 2021-11-05 ENCOUNTER — MEDICAL CORRESPONDENCE (OUTPATIENT)
Dept: HEALTH INFORMATION MANAGEMENT | Facility: CLINIC | Age: 71
End: 2021-11-05

## 2021-11-05 ENCOUNTER — ANTICOAGULATION THERAPY VISIT (OUTPATIENT)
Dept: ANTICOAGULATION | Facility: CLINIC | Age: 71
End: 2021-11-05

## 2021-11-05 ENCOUNTER — TRANSFERRED RECORDS (OUTPATIENT)
Dept: HEALTH INFORMATION MANAGEMENT | Facility: CLINIC | Age: 71
End: 2021-11-05

## 2021-11-05 ENCOUNTER — PATIENT OUTREACH (OUTPATIENT)
Dept: NURSING | Facility: CLINIC | Age: 71
End: 2021-11-05
Payer: COMMERCIAL

## 2021-11-05 DIAGNOSIS — I48.0 PAROXYSMAL ATRIAL FIBRILLATION (H): ICD-10-CM

## 2021-11-05 DIAGNOSIS — Z79.01 LONG TERM CURRENT USE OF ANTICOAGULANT THERAPY: Primary | ICD-10-CM

## 2021-11-05 DIAGNOSIS — G20.A1 PARKINSON'S DISEASE (H): ICD-10-CM

## 2021-11-05 DIAGNOSIS — R13.12 OROPHARYNGEAL DYSPHAGIA: ICD-10-CM

## 2021-11-05 DIAGNOSIS — Z93.1 GASTROSTOMY TUBE IN PLACE (H): ICD-10-CM

## 2021-11-05 DIAGNOSIS — I48.91 ATRIAL FIBRILLATION (H): ICD-10-CM

## 2021-11-05 LAB
INR (EXTERNAL): 2.3 (ref 0.9–1.1)
PATH REPORT.COMMENTS IMP SPEC: NORMAL
PATH REPORT.COMMENTS IMP SPEC: NORMAL
PATH REPORT.FINAL DX SPEC: NORMAL
PATH REPORT.GROSS SPEC: NORMAL
PATH REPORT.MICROSCOPIC SPEC OTHER STN: NORMAL
PATH REPORT.RELEVANT HX SPEC: NORMAL
PHOTO IMAGE: NORMAL

## 2021-11-05 PROCEDURE — 88307 TISSUE EXAM BY PATHOLOGIST: CPT | Mod: 26 | Performed by: PATHOLOGY

## 2021-11-05 NOTE — PROGRESS NOTES
ANTICOAGULATION MANAGEMENT     Haresh Rock 71 year old male is on warfarin with therapeutic INR result. (Goal INR 2.0-3.0)    Recent labs: (last 7 days)     11/05/21  1541   INR 2.3*       ASSESSMENT     Source(s): Patient/Caregiver Call       Warfarin doses taken: Warfarin recently held for supratherapeutic INR which may be affecting INR    Diet: Since G-tube revision earlier this week, pt has been about 4 bottles of tube feeds each day. The brand he has (Osmolite 1.5) has 40 mcg per bottle. Pt is getting 160 mcg of K/day. Prior to G tube placement pt was getting minimal nutrition. He will likely need a warfarin dose increase to compensate for increased vit K.     Tube feeds started 11/01/2021    New illness, injury, or hospitalization: Yes: Hospital Admission on 10/29/2021-11/04/2021 for SBO, s/p g-tube correction and small bowel resection.    Medication/supplement changes: None noted    Signs or symptoms of bleeding or clotting: No    Previous INR: Supratherapeutic    Additional findings: None     PLAN     Recommended plan for ongoing change(s) affecting INR     Dosing Instructions:  Increase your warfarin dose (13% change) with next INR in 4 days       Summary  As of 11/5/2021    Full warfarin instructions:  3.75 mg every Sun, Tue, Fri; 2.5 mg all other days   Next INR check:  11/9/2021             Telephone call with Haresh who verbalizes understanding and agrees to plan    Patient to recheck with home meter    Education provided: Goal range and significance of current result    Plan made with [facility] Pharmacist Elsie Saavedra RN  Anticoagulation Clinic  11/5/2021    _______________________________________________________________________     Anticoagulation Episode Summary     Current INR goal:  2.0-3.0   TTR:  60.7 % (11.7 mo)   Target end date:  Indefinite   Send INR reminders to:  CHELSEA CHILEL    Indications    Long-term (current) use of anticoagulants [Z79.01] [Z79.01]  Atrial fibrillation  (H) [I48.91]  Antiphospholipid antibody syndrome (H) (Resolved) [D68.61]  Personal history of DVT (deep vein thrombosis) (Resolved) [Z86.718]  Atrial fibrillation  unspecified type (H) (Resolved) [I48.91]  Paroxysmal atrial fibrillation (H) [I48.0]           Comments:  JESSICA rodas to manage by exception         Anticoagulation Care Providers     Provider Role Specialty Phone number    Anya Nowak MD Referring Family Medicine [phone]           contact guard

## 2021-11-05 NOTE — PROGRESS NOTES
Clinic Care Coordination Contact  Community Health Worker Initial Outreach    Spoke with pt and pt's spouse, Angelica, this morning. CTC signed 6/10/21 to communicate with Angelica.  Chart review: IP referral: care transitions IP to OP: Hospitalized 10/29/21-11/4/21 with SOB, s/p laparoscopy to reposition G-tube, small bowel resection abdominal washout.    CHW introduced Care Coordination and assessment for additional support/resources.  Pt reports he has already called [facility] this morning to see if they can assist with assessing if tube-feeding pump is working correctly. Pt reports he has all his medications in pill form but needs them in another form to possibly be added to the tube feeding mix. Angelica asks what she should be doing for the bed sore on patient's buttocks which they had been treating in the hospital. Angelica states she does drive, but doesn't like to because of her bad back. They are thinking of applying for [employer] for pt, however, pt states his insurance might provide rides.    CHW Initial Information Gathering:  Referral Source: IP Report  Preferred Hospital: [facility]  [phone]  Current living arrangement:: I live in a private home with family  Type of residence:: Private home - stairs  Community Resources: Home Infusion  Supplies used at home:: Enteral Nutrition & Supplies  Equipment Currently Used at Home: walker, rolling  Informal Support system:: Spouse, Family  No PCP office visit in Past Year: No  Transportation means:: Regular car (Spouse has a bad back and prefers not to drive.)  CHW Additional Questions  If ED/Hospital discharge, follow-up appointment scheduled as recommended?: N/A  Medication changes made following ED/Hospital discharge?: N/A    Patient accepts CC: Yes. Patient scheduled for assessment with MILA Grimaldo RN, on 11/5/21 at 11:30am. Patient noted desire to discuss medications, buttocks sore,  transportation.      [facility]: Post-Discharge Note  SITUATION                                                      Admission: 10/30/21 with abdominal pain          Discharge: 11/4/21 with LAPAROSCOPY, DIAGNOSTIC, TAKEDOWN OF MALPOSITIONED GASTROSTOMY TUBE, INSERTION OF GASTROSTOMY TUBE, SMALL BOWEL RESECTION X1, PRIMARY REPAIR OF SMALL BOWEL ENTEROTOMY, ABDOMINAL WASHOUT, TAP BLOCK       BACKGROUND                                                      You were admitted for abdominal pain due to G tube  malpositioning. You underwent surgery with repositioning of  your G tube and have been recovering appropriately after  surgery.    ASSESSMENT      Enrollment  Primary Care Care Coordination Status: Potential    Discharge Assessment  How are your symptoms? (Red Flag symptoms escalate to triage hotline per guidelines): Improved  Do you feel your condition is stable enough to be safe at home until your provider visit?: Yes  Does the patient have their discharge instructions? : Yes  Does the patient have questions regarding their discharge instructions? : Yes (see comment) (Had the pills at home, need a way to crush them and put them through the tube.)  Were you started on any new medications or were there changes to any of your previous medications? : Yes  Does the patient have all of their medications?: Yes (they are the pill form and may need to be crushed)  Do you have questions regarding any of your medications? : Yes (see comment)  Do you have all of your needed medical supplies or equipment (DME)?  (i.e. oxygen tank, CPAP, cane, etc.): Yes  Discharge follow-up appointment scheduled within 14 calendar days? : No  Is patient agreeable to assistance with scheduling? : Yes              PLAN                                                      Outpatient Plan:  Home, Home with home infusion, home with home PT and RN    Future Appointments   Date Time Provider Department Center   11/5/2021 11:30 AM NE [facility] RN  [facility]   11/9/2021  3:30 PM Marcelo Jacques MD [facility]   11/16/2021 11:00 AM Jose G Hawley MD [facility]   12/7/2021  9:00 AM  [facility] LAB DRAW [facility]   12/7/2021  9:30 AM Augusto Miller MD [facility]         For any urgent concerns, please contact our 24 hour nurse triage line: [phone] ([phone])         Fannie Ang  Community Health Worker  [facility]  [phone]

## 2021-11-05 NOTE — LETTER
[facility]  Patient Centered Plan of Care  About Me:        Patient Name:  Haresh Granados    YOB: 1950  Age:         71 year old   Radha MRN:    [id number] Telephone Information:  Home Phone [phone]   Mobile [phone]       Address:  [address] [place] [address] Email address:  [email or web address]      Emergency Contact(s)    Name Relationship Lgl Grd Work Phone Home Phone Mobile Phone   1. JANICE GRANADOS Spouse No   [phone]   2. DARWIN, LAY Daughter No   [phone]   3. SOL GRANADOS Son No   [phone]           Primary language:  English     needed? No   [facility] [employer]:  [phone] op. 1  Other communication barriers:Glasses;Language barrier    Preferred Method of Communication:  Mail  Current living arrangement: I live in a private home with family    Mobility Status/ Medical Equipment: Independent w/Device        Health Maintenance  Health Maintenance Reviewed: Due/Overdue   Health Maintenance Due   Topic Date Due     ZOSTER IMMUNIZATION (2 of 3) 11/14/2016     DIABETIC FOOT EXAM  04/23/2020     INFLUENZA VACCINE (1) 09/01/2021           My Access Plan  Medical Emergency 911   Primary Clinic Line [facility] - [phone]   24 Hour Appointment Line [phone] or  [phone]) (toll-free)   24 Hour Nurse Line [phone] (toll-free)   Preferred Urgent Care No data recorded   Preferred Hospital [facility]  [phone]     Preferred Pharmacy [id number] [place], [place] - [address]     Behavioral Health Crisis Line The National Suicide Prevention Lifeline at 1-406.545.1515 or 911             My Care Team Members  Patient Care Team       Relationship Specialty Notifications Start End    Anya Nowak MD PCP - General Family Practice  4/23/19     Phone: [phone] Fax: [phone] [address] [facility]  [facility] [address]    Augusto Miller MD Referring Physician Internal Medicine  9/30/11     Referred to Endocrinology    Phone: [phone] Fax: [phone]         [address] [facility] [address]    Jesusita Mejia PA-C Physician Assistant   3/21/12     Phone: [phone] Fax: [phone]         [address] [place] [address] [address] [facility] [address]    Pino Sharma MD MD Internal Medicine  4/11/16     Phone: [phone] Fax: [phone]         [address] [facility] [address] [facility] [address]    Fabi Jimenez MD MD INTERNAL MEDICINE - ENDOCRINOLOGY, DIABETES & METABOLISM  4/18/16     Phone: [phone] Fax: [phone]         [address] [facility] [address] [facility] [address]    Bekah Matt MD MD Cardiology  8/12/16     Phone: [phone] Fax: [phone]         [address] [place] [address] [address] [place] [address]    Tina Mejia RN Specialty Care Coordinator Cardiology  3/6/19     Phone: [phone] Fax: [phone]         [address] [facility] [address]    Shanelle Yeager, RN Specialty Care Coordinator BMT - Adult Admissions 4/5/19     Phone: [phone] Pager: [phone] Fax: [phone]        [address] [place] [address] [address] [facility] [address]    Anya Nowak MD Assigned PCP   4/28/19     Phone: [phone] Fax: [phone]         [address] [place] [address]. [facility] [address]    Bekah Matt MD Assigned Heart and Vascular Provider   10/23/20     Phone: [phone] Fax: [phone]         [address] [address]    Ángel Hill MD Assigned Neuroscience Provider   11/15/20     Phone: [phone] Fax: [phone]         [address] [place] [address]    Leslie Clifford [facility] Pharmacist Pharmacist  11/17/20     Phone: [phone] Pager: [phone]         [address] [facility] [address]    Rosalva Samuels MD MD Family Medicine  6/1/21     Phone: [phone] Fax: [phone] [address] [place]  [address]    Chelsey Crews MD Assigned Gastroenterology Provider   6/20/21     Phone: [phone] Fax: [phone]         [address] [facility] [address] [facility] [address]    Jose G Hawley MD Assigned Surgical Provider   8/1/21     Phone: [phone] Fax: [phone]         [address] [place] [address]    Bekah Matt MD MD Clinical Cardiac Electrophysiology  9/10/21     Phone: [phone] Fax: [phone]         [address] [facility]  [facility] [address]    Marcelo Jacques MD Assigned Infectious Disease Provider   9/12/21     Phone: [phone] Fax: [phone]         [address] [facility] [address] [facility] [address]    Uriel Van, RN Lead Care Coordinator Primary Care - CC Admissions 11/5/21     Phone: [phone] Fax: [phone]                My Care Plans  Self Management and Treatment Plan  Goals and (Comments)  Goals        General     #1  Functional (pt-stated)      Notes - Note edited  11/5/2021  2:14 PM by Uriel Van, RN     Goal Statement: I would like to be able to walk without the walker.  I need to increase my strength and stamina in order to get back to this level of walking by performing the exercises from PT, and practicing walking around the room as tolerated.  Date Goal set: 11/5/21  Barriers: recent hospitalization  Strengths: Engaged in care coordination  Date to Achieve By: 5/5/2022  Patient expressed understanding of goal: Yes  Action steps to achieve this goal:  1. I will walk around the house with the walker to increase my strength and stamina.  2. I will practice the exercises from PT between sessions.   3. I will continue                Action Plans on File:                       Advance Care Plans/Directives Type:   No data recorded    My Medical and Care Information  Problem List   Patient Active Problem List   Diagnosis     Hemochromatosis     Bcwoy-iwkext-dhcy disease, chronic (H)     Advanced directives, counseling/discussion     Polyneuropathy      Status post total knee replacements     Status post bone marrow transplant (H)     Hyperlipidemia with target LDL less than 100     Atrial fibrillation (H)     Chronic rhinitis     Gallstones without obstruction of gallbladder     Long-term (current) use of anticoagulants [Z79.01]     Thyroid nodule     Type 2 diabetes mellitus with stage 2 chronic kidney disease, without long-term current use of insulin (H)     History of Hodgkin's lymphoma     History of irradiation, presenting hazards to health     Primary pulmonary hypertension (H)     Hereditary hemochromatosis (H)     Oropharyngeal dysphagia     Articulation disorder     Personal history of PE (pulmonary embolism)     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    Care Coordination Start Date: 11/5/2021   Frequency of Care Coordination: monthly     Form Last Updated: 11/05/2021

## 2021-11-05 NOTE — PROGRESS NOTES
Clinic Care Coordination Contact    Clinic Care Coordination Contact  OUTREACH    Referral Information:  Referral Source: IP Report    Primary Diagnosis: GI Disorders    Chief Complaint   Patient presents with     Clinic Care Coordination - Post Hospital     RN CC nurse care coordinator Uriel        [place] Utilization: The patient uses the [facility] system and the [facility].  Clinic Utilization  Difficulty keeping appointments:: No  Compliance Concerns: No  No-Show Concerns: No  No PCP office visit in Past Year: No  Utilization    Hospital Admissions  5             ED Visits  3             No Show Count (past year)  6                Current as of: 11/4/2021 10:19 PM              Clinical Concerns:  Current Medical Concerns: The patient was recently hospitalized with a small bowel obstruction, and Parkinson's disease.  The patient had a G-tube placed and is learning how to use this at this time.  Per the discharge instructions [facility] was supposed to be providing RN care as well as PT and home health aide.  And the patient and his spouse had heard nothing from them.  A number of calls later we were able to get [facility] to make an appointment for tomorrow with them as they need help with this tube feeding as well as what to do with his medications and how to get them through the tube.  The patient and his spouse are very relieved to have things in place for tomorrow and the nurse at [facility] is very aware of the education that needs to be done with the family.  The patient had questions that were answered to his satisfaction.  The RN CC also spoke with the patient about his work on walking around the house to increase his strength and stamina to get back to what it was before this hospitalization.  The patient's goal is to walk without the walker, so we will work on that.  The patient will have physical therapy coming out so he can perform those exercises between classes to work on that strength and  stamina.    Patient Active Problem List   Diagnosis     Hemochromatosis     Kkhdw-qovebq-euoa disease, chronic (H)     Advanced directives, counseling/discussion     Polyneuropathy     Status post total knee replacements     Status post bone marrow transplant (H)     Hyperlipidemia with target LDL less than 100     Atrial fibrillation (H)     Chronic rhinitis     Gallstones without obstruction of gallbladder     Long-term (current) use of anticoagulants [Z79.01]     Thyroid nodule     Type 2 diabetes mellitus with stage 2 chronic kidney disease, without long-term current use of insulin (H)     History of Hodgkin's lymphoma     History of irradiation, presenting hazards to health     Primary pulmonary hypertension (H)     Hereditary hemochromatosis (H)     Oropharyngeal dysphagia     Articulation disorder     Personal history of PE (pulmonary embolism)     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)     Abnormal dopamine scan (DaTSCAN) 2020     ACP (advance care planning)     Thrombocytopenia (H)     Paroxysmal atrial fibrillation (H)     Benign prostatic hyperplasia with urinary retention     Constipation     Parkinson's disease (H)     Gastrostomy tube in place (H)     Small bowel obstruction (H)     Current Behavioral Concerns: None currently noted.  Education Provided to patient: Options for care coordination.  And the educational questions he asked regarding the tube feeding and being able to see the drips.  Pain  Pain (GOAL):: No  Health Maintenance Reviewed: Due/Overdue   Health Maintenance Due   Topic Date Due     ZOSTER IMMUNIZATION (2 of 3) 11/14/2016     DIABETIC FOOT EXAM  04/23/2020     INFLUENZA VACCINE (1) 09/01/2021       Clinical Pathway: None    Medication Management:  Medication review status: Medications reviewed and no changes reported per patient.        Patient is knowledgeable on medications and is adherent.  No financial concerns reported at this time.  Medication review was completed with the  patient and there are no questions or concerns at this time.       Functional Status:  Dependent ADLs:: Ambulation-walker, Independent  Bed or wheelchair confined:: No  Mobility Status: Independent w/Device  Fallen 2 or more times in the past year?: No  Any fall with injury in the past year?: No    Living Situation:  Current living arrangement:: I live in a private home with family  Type of residence:: Private home - stairs    Lifestyle & Psychosocial Needs:    Social Determinants of Health     Tobacco Use: Low Risk      Smoking Tobacco Use: Never Smoker     Smokeless Tobacco Use: Never Used   Alcohol Use:      Frequency of Alcohol Consumption:      Average Number of Drinks:      Frequency of Binge Drinking:    Financial Resource Strain:      Difficulty of Paying Living Expenses:    Food Insecurity:      Worried About Running Out of Food in the Last Year:      Ran Out of Food in the Last Year:    Transportation Needs:      Lack of Transportation (Medical):      Lack of Transportation (Non-Medical):    Physical Activity:      Days of Exercise per Week:      Minutes of Exercise per Session:    Stress:      Feeling of Stress :    Social Connections:      Frequency of Communication with Friends and Family:      Frequency of Social Gatherings with Friends and Family:      Attends [employer] Services:      Active Member of Clubs or Organizations:      Attends Club or Organization Meetings:      Marital Status:    Intimate Partner Violence:      Fear of Current or Ex-Partner:      Emotionally Abused:      Physically Abused:      Sexually Abused:    Depression: Not at risk     PHQ-2 Score: 1   Housing Stability:      Unable to Pay for Housing in the Last Year:      Number of Places Lived in the Last Year:      Unstable Housing in the Last Year:      Diet:: Diabetic diet  Inadequate nutrition (GOAL):: Yes  Tube Feeding: Yes  Tube Feeding: G-tube  Inadequate activity/exercise (GOAL):: No  Significant changes in sleep pattern  (GOAL): No  Transportation means:: Regular car (Spouse has a bad back and prefers not to drive.)     Buddhist or spiritual beliefs that impact treatment:: No  Mental health DX:: No  Mental health management concern (GOAL):: No  Informal Support system:: Spouse, Family             Resources and Interventions:  Current Resources:   Skilled Home Care Services: Skilled Nursing, Physicial Therapy, Home Health Aid  Community Resources: Home Infusion, Home Care  Supplies used at home:: Enteral Nutrition & Supplies  Equipment Currently Used at Home: walker, rolling, glucometer  Employment Status: retired         Advance Care Plan/Directive  Advanced Care Plans/Directives on file:: Yes  Type Advanced Care Plans/Directives: Advanced Directive - On File    Referrals Placed: None     Goals:   Goals        General     #1  Functional (pt-stated)      Notes - Note edited  11/5/2021  2:14 PM by Uriel Van RN     Goal Statement: I would like to be able to walk without the walker.  I need to increase my strength and stamina in order to get back to this level of walking by performing the exercises from PT, and practicing walking around the room as tolerated.  Date Goal set: 11/5/21  Barriers: recent hospitalization  Strengths: Engaged in care coordination  Date to Achieve By: 5/5/2022  Patient expressed understanding of goal: Yes  Action steps to achieve this goal:  1. I will walk around the house with the walker to increase my strength and stamina.  2. I will practice the exercises from PT between sessions.   3. I will continue               Patient/Caregiver understanding: The patient and his spouse have a very good understanding of the disease process.  They have very pertinent questions and they were answered to their satisfaction.    Outreach Frequency: monthly  Future Appointments              In 4 days Marcelo Jacques MD [facility] [place]    In 5 days Anya Nowak  MD [place] DOUG [facility]    In 1 week Jose G Hawley MD [facility] DOUG [facility]    In 1 month [facility] LAB DRAW [facility]    In 1 month Augusto Miller MD [facility] Blood and Marrow Transplant Program [facility]          Plan: 1.  The patient will walk around the house with a walker to increase his strength and stamina.  2.  The patient will practice the exercises from PT between classes.  3.  The patient will take all medications as prescribed by the providers.          Uriel Zhou MSN, RN, PHN, [facility]   Primary Care Clinical RN Care Coordinator  [facility]  11/5/2021   4:06 PM  [email or web address]  Office: [phone]

## 2021-11-05 NOTE — LETTER
[facility] CARE COORDINATION  [address]  [place] [address]    November 5, 2021    Haresh Rock  [address] [address]      Dear Haresh,    I am a clinic care coordinator who works with Anya Nowak MD at [facility]. I wanted to introduce myself and provide you with my contact information for you to be able to call me with any questions or concerns. I wanted to thank you for spending the time to talk with me.  Below is a description of clinic care coordination and how I can further assist you.      The clinic care coordination team is made up of a registered nurse,  and community health worker who understand the health care system. The goal of clinic care coordination is to help you manage your health and improve access to the health care system in the most efficient manner. The team can assist you in meeting your health care goals by providing education, coordinating services, strengthening the communication among your providers and supporting you with any resource needs.    Please feel free to contact me at [phone] with any questions or concerns. We are focused on providing you with the highest-quality healthcare experience possible and that all starts with you.     Sincerely,     Uriel Zhou MSN, RN, PHN, [facility]   Primary Care Clinical RN Care Coordinator  [facility]  11/5/2021   3:56 PM  [email or web address]  Office: [phone]      Enclosed: I have enclosed a copy of the Patient Centered Plan of Care. This has helpful information and goals that we have talked about. Please keep this in an easy to access place to use as needed.

## 2021-11-06 ENCOUNTER — MEDICAL CORRESPONDENCE (OUTPATIENT)
Dept: HEALTH INFORMATION MANAGEMENT | Facility: CLINIC | Age: 71
End: 2021-11-06
Payer: COMMERCIAL

## 2021-11-08 ENCOUNTER — TELEPHONE (OUTPATIENT)
Dept: FAMILY MEDICINE | Facility: CLINIC | Age: 71
End: 2021-11-08
Payer: COMMERCIAL

## 2021-11-08 DIAGNOSIS — I48.0 PAROXYSMAL ATRIAL FIBRILLATION (H): Primary | ICD-10-CM

## 2021-11-08 NOTE — TELEPHONE ENCOUNTER
Darcy from [facility] for skilled nursing 1x week for 2 weeks,2x week for 1 week and 1xweek for 3 weeks for G-Tube education and management,disease edu manag,pressure Ulcer edu/manag fall prevent edu/manag.    Paitient suppose to have things by mouth Ice chips only need to change to go in G tube Metoprolol -to metoprolol tartate and Dutasteride pharmacy said no option will have to send different medication.    Zayra Manjarrez,

## 2021-11-09 ENCOUNTER — ANTICOAGULATION THERAPY VISIT (OUTPATIENT)
Dept: FAMILY MEDICINE | Facility: CLINIC | Age: 71
End: 2021-11-09
Payer: COMMERCIAL

## 2021-11-09 ENCOUNTER — TRANSFERRED RECORDS (OUTPATIENT)
Dept: HEALTH INFORMATION MANAGEMENT | Facility: CLINIC | Age: 71
End: 2021-11-09

## 2021-11-09 ENCOUNTER — VIRTUAL VISIT (OUTPATIENT)
Dept: INFECTIOUS DISEASES | Facility: CLINIC | Age: 71
End: 2021-11-09
Attending: INTERNAL MEDICINE
Payer: COMMERCIAL

## 2021-11-09 DIAGNOSIS — R91.8 GROUND GLASS OPACITY PRESENT ON IMAGING OF LUNG: Primary | ICD-10-CM

## 2021-11-09 DIAGNOSIS — I48.0 PAROXYSMAL ATRIAL FIBRILLATION (H): ICD-10-CM

## 2021-11-09 DIAGNOSIS — R70.0 ELEVATED ERYTHROCYTE SEDIMENTATION RATE: ICD-10-CM

## 2021-11-09 DIAGNOSIS — R79.82 ELEVATED C-REACTIVE PROTEIN (CRP): ICD-10-CM

## 2021-11-09 DIAGNOSIS — Z79.01 LONG TERM CURRENT USE OF ANTICOAGULANT THERAPY: Primary | ICD-10-CM

## 2021-11-09 DIAGNOSIS — I48.91 ATRIAL FIBRILLATION (H): ICD-10-CM

## 2021-11-09 PROBLEM — L89.90 PRESSURE ULCER: Status: ACTIVE | Noted: 2021-11-09

## 2021-11-09 LAB — INR (EXTERNAL): 1.7 (ref 0.9–1.1)

## 2021-11-09 PROCEDURE — 99214 OFFICE O/P EST MOD 30 MIN: CPT | Mod: 95 | Performed by: INTERNAL MEDICINE

## 2021-11-09 NOTE — PROGRESS NOTES
Chart reviewed with [facility] RN at time of encounter.    Agree with dose increase ~6% now, and advise boost to 5mg today, with recheck next week.    Xiomara Tello, PharmD BCACP  Anticoagulation Clinical Pharmacist

## 2021-11-09 NOTE — LETTER
11/9/2021       RE: Haresh Rock  [address] [place] [address]     Dear Colleague,    Thank you for referring your patient, Haresh Rock, to the [facility] DISEASE [facility] [place] at [facility]. Please see a copy of my visit note below.      Haresh Rock is a 71 year old man who is being evaluated via a billable video visit.      How would you like to obtain your AVS? MyChart  If the video visit is dropped, the invitation should be resent by: Send to e-mail at: [email or web address]  Will anyone else be joining your video visit? No        Reason for visit:   Infectious Disease Return Visit, planned follow-up for elevated ESR    SUBJECTIVE:    Haresh did have his feeding tube placed.  It ended up being dislodged and he required another hospitalization to repair it.   He is now avoiding all food by mouth.    He is feeling overall weak and tired of being in and out of the hospital so much.   He has not had fevers since August 2021.   No new symptoms.   Stable respiratory symptoms.    CT Chest with bilateral ground glass opacities, radiology noted that this may reflect healing process, some small nodules are seen.    I have reviewed and updated the patient's Past Medical History, Social History, Family History and Medication List.    OBJECTIVE:    Current Outpatient Medications   Medication     acetaminophen (TYLENOL) 500 MG tablet     artificial saliva (BIOTENE MT) SOLN solution     calcium carbonate (TUMS) 500 MG chewable tablet     flecainide (TAMBOCOR) 50 MG tablet     fluticasone (FLONASE) 50 MCG/ACT nasal spray     loratadine (CLARITIN REDITABS) 10 MG ODT     Metoprolol Succinate (KAPSPARGO) 25 MG CS24     Multiple Vitamins-Minerals (MULTIVITAMIN GUMMIES ADULT PO)     omeprazole (PRILOSEC) 20 MG DR capsule     ondansetron (ZOFRAN-ODT) 4 MG ODT tab     PREVIDENT 5000 DRY MOUTH 1.1 % GEL topical gel     rosuvastatin (CRESTOR) 40 MG tablet      senna-docusate (SENOKOT-S/PERICOLACE) 8.6-50 MG tablet     Starch-Maltodextrin (THICK-IT PO)     vitamin D3 (CHOLECALCIFEROL) 50 mcg (2000 units) tablet     warfarin ANTICOAGULANT (COUMADIN) 2.5 MG tablet       Allergies   Allergen Reactions     Seasonal Allergies        Exam via video visit:     GENERAL: Patient appears chronically ill but is not in any acute distress    HEENT: The eyes do not appear to have any icterus or injection.  EOM appear intact.  RESPIRATORY:  No cough or labored breathing is noted.  SKIN:  No rashes are visible  NEURO:  Awake, alert, no focal neurologic deficits are noted.  PSYCH: Affect is bright. Speech fluent and appropriate.      The rest of a comprehensive physical examination is deferred due to the public health emergency video visit restrictions.    Labs  Reviewed extensively in Epic     Newest:  11/1/2021 CRP  180 mg/dL  CBC with WBC of 10.7, platelets 98    10/14/2021 ESR  105    For comparison:  8/3/2021 with ESR of 90 and CRP of 12 mg/dL.   CBC with normal WBC and platelets of 147     Imaging  2/26/2021  PET with CT:  In this patient with history of Hodgkin lymphoma:  1. Nodular opacities in the left lower lobe with associated  endobronchial debris, also by basilar groundglass opacities which may  represent infection/inflammation versus aspiration, including Covid.  2. Mildly hypermetabolic left hilar and subcarinal lymph node, favored  to be reactive.  3. No hypermetabolic lymphadenopathy in the abdomen or pelvis.  4. Hypermetabolic activity at the anorectal junction with diffuse wall  thickening on CT. Recommend direct visualization.  5. Punctate calcific density at the right UVJ without associated  hydroureter. This may represent recently passed renal stone versus  bladder calculus.  6. Additional incidental findings are stable, including cholelithiasis  without acute cholecystitis, diverticulosis without acute  diverticulitis, and prostatomegaly.     10/14/2021  CT  Chest  IMPRESSION:   1. Decreased conspicuity of the left lower lobe predominant nodular  opacities, which now appear more groundglass and solid. This may  represents resolving inflammation or infection.  2. Stable sub-6 mm pulmonary nodules. These may be infectious,  inflammatory or neoplastic in etiology. Attention on follow-up.  3. Cholelithiasis without evidence of cholecystitis.     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 He also has hereditary hemachromatosis with cirrhosis, and a recent diagnosis of Parkinson's disease. He also has progressive weight loss over several months, but also has dysphagia related to the Parkinson's, and his diet was fairly limited before recent feeding tube placement.      Upon extensive lab evaluation he was found to have an ESR of 92 on 2/15/21 and this has remained persistently elevated on several checks since.   There has not been clear evidence of infection, rash, joint pain, joint swelling, or headache.  CRP is rising from 12 mg/dL in August to now 180 mg/dL.      2/26/2021 CTs and PET without evidence for recurrence of malignancy.  There were findings of nodular opacities in the left lower lobe with associated endobronchial debris, also with basilar groundglass opacities.  There was also hypermetabolic activity at the anorectal junction with diffuse wall thickening on CT.     3/17/2021 Flex sigmoidoscopy discovered a 4 mm polyp in the distal sigmoid colon that was removed and erythematous mucosa in the entire examined colon that was biopsied. Histopathology noted colonic mucosa with superficial vascular congestion and melanosis coli without evidence for GVHD.  The polyp was a tubular adenoma, negative for high-grade  dysplasia.  Full colonoscopy was done 4/14/2021 without new findings.     About once per month, Haresh reported that he would get a low grade fever.  This happened last on 8/15.  He woke up in the night with a temp of 100.9 F and chills.   He thinks this happens when he gets constipated.   He does not get diarrhea, unless he uses a stool regimen to help with the constipation.  Has not recurred since 8/15.     He can get out of breath with mild exertion, like 1-2 flights of stairs.  He lives in a split level home, and he does get around the half flights fairly well.  He feels drainage down the back of his throat and will cough to clear his throat.  He does not think he has a cough that comes deep from the chest.   His voice volume is lower and he feels hoarse.  He is at risk for aspiration with his dysphagia, now prevented with no food by mouth.     Patient has mounting comorbidities.   Among them the history of GVHD stood out as a potential contributor to elevation of inflammatory markers.  However, the scans in 2/2021 did not suggest ongoing GVHD and the biopsy from the sigmoid was not consistent.  Since there was inflammation throughout the colon, I do wonder if the biopsy missed it. Otherwise,  the pulmonary findings on CT suggest we may be missing an indolent pathogen, perhaps fungal or not-tuberculous mycobacterium.   He was not able to provide an adequate expectorated sputum.     PLAN:     - Bronchoscopy to evaluate for atypical organisms.  Please send full panel including fungal and AFB testing.    - Continue to trend CRP and ESR.   I did not order these myself because patient is getting frequent blood draws, and these can be added by other providers.    - Follow-up in 1 month 12/14/2021 at 4:30PM.        Video-Visit Details    Type of service:  Video Visit    Video Start Time: 4:03 PM  Video End Time:  4:20 PM    17 minutes    Originating Location (pt. Location): Home    Distant Location (provider location):   [facility] [place]     Platform used for Video Visit: Good Jacques MD, MS  Infectious Disease    MDM billing:  3 total problems addressed, 1 progressing  Reviewed > 3 notes from other providers and recent hospitalization  Reviewed > 3 laboratory results with independent interpretation  Reviewed CT Chest with independent interpretation

## 2021-11-09 NOTE — PROGRESS NOTES
ANTICOAGULATION MANAGEMENT     Haresh Rock 71 year old male is on warfarin with subtherapeutic INR result. (Goal INR 2.0-3.0)    Recent labs: (last 7 days)     11/09/21  1210   INR 1.7*       ASSESSMENT     Source(s): Chart Review and Patient/Caregiver Call       Warfarin doses taken: Warfarin taken as instructed    Diet: No new diet changes identified    New illness, injury, or hospitalization: No    Medication/supplement changes: None noted    Signs or symptoms of bleeding or clotting: No    Previous INR: Therapeutic last visit; previously outside of goal range    Additional findings: Patient would like to get back to checking INR's on Fridays     PLAN     Recommended plan for no diet, medication or health factor changes affecting INR     Dosing Instructions: Booster dose then Increase your warfarin dose (5.9% change) with next INR in 1 week       Summary  As of 11/9/2021    Full warfarin instructions:  11/9: 5 mg; Otherwise 2.5 mg every Mon, Thu, Sat; 3.75 mg all other days   Next INR check:  11/16/2021             Telephone call with Haresh who verbalizes understanding and agrees to plan    Patient to recheck with home meter    Education provided: Impact of vitamin K foods on INR and Goal range and significance of current result    Plan made per [facility] anticoagulation protocol    Laverne Ferguson RN  Anticoagulation Clinic  11/9/2021    _______________________________________________________________________     Anticoagulation Episode Summary     Current INR goal:  2.0-3.0   TTR:  60.4 % (11.7 mo)   Target end date:  Indefinite   Send INR reminders to:  CHELSEA CHILEL    Indications    Long-term (current) use of anticoagulants [Z79.01] [Z79.01]  Atrial fibrillation (H) [I48.91]  Antiphospholipid antibody syndrome (H) (Resolved) [D68.61]  Personal history of DVT (deep vein thrombosis) (Resolved) [Z86.718]  Atrial fibrillation  unspecified type (H) (Resolved) [I48.91]  Paroxysmal atrial fibrillation (H) [I48.0]            Comments:  JESSICA okay to manage by exception         Anticoagulation Care Providers     Provider Role Specialty Phone number    Anya Nowak MD Referring Family Medicine [phone]

## 2021-11-09 NOTE — PROGRESS NOTES
Routing to [facility] to consult.    Laverne Ferguson RN - [facility] Anticoagulation [facility]

## 2021-11-09 NOTE — TELEPHONE ENCOUNTER
Left detailed message with verbal ok for orders requested per RN protocol.     Is Darcy requesting liquid form of medications for G tube? Requesting call back to [phone] to clarify.    Mayi Gagnon RN  [facility]

## 2021-11-10 ENCOUNTER — OFFICE VISIT (OUTPATIENT)
Dept: FAMILY MEDICINE | Facility: CLINIC | Age: 71
End: 2021-11-10
Payer: COMMERCIAL

## 2021-11-10 VITALS
SYSTOLIC BLOOD PRESSURE: 114 MMHG | OXYGEN SATURATION: 98 % | DIASTOLIC BLOOD PRESSURE: 66 MMHG | BODY MASS INDEX: 18.96 KG/M2 | WEIGHT: 136 LBS | TEMPERATURE: 97.3 F | HEART RATE: 88 BPM

## 2021-11-10 DIAGNOSIS — D69.6 THROMBOCYTOPENIA (H): ICD-10-CM

## 2021-11-10 DIAGNOSIS — R13.12 OROPHARYNGEAL DYSPHAGIA: ICD-10-CM

## 2021-11-10 DIAGNOSIS — E43 SEVERE PROTEIN-CALORIE MALNUTRITION (H): ICD-10-CM

## 2021-11-10 DIAGNOSIS — G20.A1 PARKINSON'S DISEASE (H): ICD-10-CM

## 2021-11-10 DIAGNOSIS — L89.90 PRESSURE INJURY OF SKIN, UNSPECIFIED INJURY STAGE, UNSPECIFIED LOCATION: ICD-10-CM

## 2021-11-10 DIAGNOSIS — K56.609 SMALL BOWEL OBSTRUCTION (H): Primary | ICD-10-CM

## 2021-11-10 DIAGNOSIS — Z98.890 S/P EXPLORATORY LAPAROTOMY: ICD-10-CM

## 2021-11-10 DIAGNOSIS — I48.0 PAROXYSMAL ATRIAL FIBRILLATION (H): ICD-10-CM

## 2021-11-10 DIAGNOSIS — Z93.1 GASTROSTOMY TUBE IN PLACE (H): ICD-10-CM

## 2021-11-10 PROCEDURE — 90662 IIV NO PRSV INCREASED AG IM: CPT | Performed by: FAMILY MEDICINE

## 2021-11-10 PROCEDURE — 99495 TRANSJ CARE MGMT MOD F2F 14D: CPT | Performed by: FAMILY MEDICINE

## 2021-11-10 PROCEDURE — G0008 ADMIN INFLUENZA VIRUS VAC: HCPCS | Performed by: FAMILY MEDICINE

## 2021-11-10 NOTE — TELEPHONE ENCOUNTER
Called Darcy with [facility] and updated her on the message from provider.  She asked that the orders be faxed to their office at [phone]    Please fax a copy of Anya Davidson's note below and [facility] pharmacy referral    Bharath Phillips RN  [facility]

## 2021-11-10 NOTE — TELEPHONE ENCOUNTER
Recommend continuation of Avodart po until follow-up with urology     Change metoprolol to   Signed Prescriptions:                        Disp   Refills    Metoprolol Succinate (KAPSPARGO) 25 MG [id number] cap*3        Si capsule by Tube route daily . Open capsule and add           contents to an all plastic oral tip syringe; add           15 mL of water. Gently shake the syringe for ~10           seconds. Immediately deliver mixture through           tube. No granules should remain in the syringe;           rinse syringe with additional water if necessary.  Authorizing Provider: PROMISE FENTON

## 2021-11-10 NOTE — PROGRESS NOTES
Assessment & Plan     Small bowel obstruction (H)  S/P exploratory laparotomy  Gastrostomy tube in place (H)  Severe protein-calorie malnutrition (H)  -doing well after surgery, tube feedings going well   - Nutrition Referral; Future  -follow weight   -follow-up with me in person in 3 months; message me for updates     Oropharyngeal dysphagia  Parkinson's disease (H)  -follow-up with neurology   -appreciate ID evaluation of suspected recurrent aspiration     Pressure injury of skin, unspecified injury stage, unspecified location  -patient declines examination today and reports that topical wound care and position changes are improving symptoms   -follow-up as needed     Paroxysmal atrial fibrillation (H)  -rate controlled and anticoagulated     Thrombocytopenia (H)  -stable, follow         Return in about 3 months (around 2/10/2022) for diabetes.    Anya Nowak MD  [facility] DOUG Hutson is a 71 year old who presents for the following health issues  accompanied by his spouse.    HPI     Post Discharge Outreach 11/5/2021   Admission Date 10/30/2021   Reason for Admission Abdominal pain   Discharge Date 11/4/2021   How are your symptoms? (Red Flag symptoms escalate to triage hotline per guidelines) Improved   Do you feel your condition is stable enough to be safe at home until your provider visit? Yes   Does the patient have their discharge instructions?  Yes   Does the patient have questions regarding their discharge instructions?  Yes (see comment)   Were you started on any new medications or were there changes to any of your previous medications?  Yes   Does the patient have all of their medications? Yes   Do you have questions regarding any of your medications?  Yes (see comment)   Do you have all of your needed medical supplies or equipment (DME)?  (i.e. oxygen tank, CPAP, cane, etc.) Yes   Discharge follow-up appointment scheduled within 14 calendar days?  No     Hospital  Follow-up Visit:    Hospital/Nursing Home/IP Rehab Facility: [facility]  Date of Admission: 10/29/251  Date of Discharge: 11/4/21  Reason(s) for Admission:       Was your hospitalization related to COVID-19? No   Problems taking medications regularly:  None  Medication changes since discharge: None  Problems adhering to non-medication therapy:  None    Summary of hospitalization:  [facility] discharge summary reviewed  Diagnostic Tests/Treatments reviewed.  Follow up needed: none  Other Healthcare Providers Involved in Patient s Care:         Homecare  Update since discharge: improved.       Post Discharge Medication Reconciliation: discharge medications reconciled, continue medications without change.  Plan of care communicated with patient and family                Review of Systems   CONSTITUTIONAL:POSITIVE  for weight loss  INTEGUMENTARY/SKIN: healing abdominal surgical wounds   EYES: NEGATIVE for vision changes or irritation  ENT/MOUTH: NEGATIVE for ear, mouth and throat problems  RESP: NEGATIVE for significant cough or SOB  CV: NEGATIVE for chest pain, palpitations or peripheral edema  GI: GI tube in place; NEGATIVE for nausea, abdominal pain, heartburn, or change in bowel habits   male: symptomatic BPH  MUSCULOSKELETAL: NEGATIVE for significant arthralgias or myalgia  NEURO: rigidity and gait disturbance due to Parkinson's   ENDOCRINE: NEGATIVE for temperature intolerance, skin/hair changes  HEME: NEGATIVE for bleeding problems  PSYCHIATRIC: NEGATIVE for changes in mood or affect      Objective    /66 (BP Location: Right arm, Patient Position: Sitting, Cuff Size: Adult Regular)   Pulse 88   Temp 97.3  F (36.3  C) (Oral)   Wt 61.7 kg (136 lb)   SpO2 98%   BMI 18.96 kg/m    Body mass index is 18.96 kg/m .  Physical Exam   GENERAL: alert, no distress, frail, elderly, fatigued and appears older than stated age  EYES: Eyes grossly normal to  inspection, PERRL and conjunctivae and sclerae normal  NECK: no adenopathy, no asymmetry, masses, or scars and thyroid normal to palpation  RESP: lungs clear to auscultation - no rales, rhonchi or wheezes  CV: regular rate and rhythm, normal S1 S2, no S3 or S4, no murmur, click or rub, no peripheral edema    ABDOMEN: soft, nontender, bowel sounds normal and well-healed incisions with eschar and G tube in place   MS: no gross musculoskeletal defects noted, no edema  SKIN: no suspicious lesions or rashes  NEURO: mentation intact and rigid gait   PSYCH: affect flat, judgement and insight intact and appearance well groomed    Anticoagulation Therapy Visit on 11/09/2021   Component Date Value Ref Range Status     INR (External) 11/09/2021 1.7* 0.9 - 1.1 Final

## 2021-11-10 NOTE — Clinical Note
David Middleton - I put in a referral but wonder if you would give advice on metoprolol dosing per G tube - he'll discuss Avodart with urology next week - Thanks! Anya Nowak MD

## 2021-11-10 NOTE — PROGRESS NOTES
Haresh Rock is a 71 year old man who is being evaluated via a billable video visit.      How would you like to obtain your AVS? MyChart  If the video visit is dropped, the invitation should be resent by: Send to e-mail at: [email or web address]  Will anyone else be joining your video visit? No        Reason for visit:   Infectious Disease Return Visit, planned follow-up for elevated ESR    SUBJECTIVE:    Haresh did have his feeding tube placed.  It ended up being dislodged and he required another hospitalization to repair it.   He is now avoiding all food by mouth.    He is feeling overall weak and tired of being in and out of the hospital so much.   He has not had fevers since August 2021.   No new symptoms.   Stable respiratory symptoms.    CT Chest with bilateral ground glass opacities, radiology noted that this may reflect healing process, some small nodules are seen.    I have reviewed and updated the patient's Past Medical History, Social History, Family History and Medication List.    OBJECTIVE:    Current Outpatient Medications   Medication     acetaminophen (TYLENOL) 500 MG tablet     artificial saliva (BIOTENE MT) SOLN solution     calcium carbonate (TUMS) 500 MG chewable tablet     flecainide (TAMBOCOR) 50 MG tablet     fluticasone (FLONASE) 50 MCG/ACT nasal spray     loratadine (CLARITIN REDITABS) 10 MG ODT     Metoprolol Succinate (KAPSPARGO) 25 MG CS24     Multiple Vitamins-Minerals (MULTIVITAMIN GUMMIES ADULT PO)     omeprazole (PRILOSEC) 20 MG DR capsule     ondansetron (ZOFRAN-ODT) 4 MG ODT tab     PREVIDENT 5000 DRY MOUTH 1.1 % GEL topical gel     rosuvastatin (CRESTOR) 40 MG tablet     senna-docusate (SENOKOT-S/PERICOLACE) 8.6-50 MG tablet     Starch-Maltodextrin (THICK-IT PO)     vitamin D3 (CHOLECALCIFEROL) 50 mcg (2000 units) tablet     warfarin ANTICOAGULANT (COUMADIN) 2.5 MG tablet       Allergies   Allergen Reactions     Seasonal Allergies        Exam via video visit:     GENERAL: Patient appears  chronically ill but is not in any acute distress    HEENT: The eyes do not appear to have any icterus or injection.  EOM appear intact.  RESPIRATORY:  No cough or labored breathing is noted.  SKIN:  No rashes are visible  NEURO:  Awake, alert, no focal neurologic deficits are noted.  PSYCH: Affect is bright. Speech fluent and appropriate.      The rest of a comprehensive physical examination is deferred due to the public [facility] emergency video visit restrictions.    Labs  Reviewed extensively in Epic     Newest:  11/1/2021 CRP  180 mg/dL  CBC with WBC of 10.7, platelets 98    10/14/2021 ESR  105    For comparison:  8/3/2021 with ESR of 90 and CRP of 12 mg/dL.   CBC with normal WBC and platelets of 147     Imaging  2/26/2021  PET with CT:  In this patient with history of Hodgkin lymphoma:  1. Nodular opacities in the left lower lobe with associated  endobronchial debris, also by basilar groundglass opacities which may  represent infection/inflammation versus aspiration, including Covid.  2. Mildly hypermetabolic left hilar and subcarinal lymph node, favored  to be reactive.  3. No hypermetabolic lymphadenopathy in the abdomen or pelvis.  4. Hypermetabolic activity at the anorectal junction with diffuse wall  thickening on CT. Recommend direct visualization.  5. Punctate calcific density at the right UVJ without associated  hydroureter. This may represent recently passed renal stone versus  bladder calculus.  6. Additional incidental findings are stable, including cholelithiasis  without acute cholecystitis, diverticulosis without acute  diverticulitis, and prostatomegaly.     10/14/2021  CT Chest  IMPRESSION:   1. Decreased conspicuity of the left lower lobe predominant nodular  opacities, which now appear more groundglass and solid. This may  represents resolving inflammation or infection.  2. Stable sub-6 mm pulmonary nodules. These may be infectious,  inflammatory or neoplastic in etiology. Attention on  follow-up.  3. Cholelithiasis without evidence of cholecystitis.     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 He also has hereditary hemachromatosis with cirrhosis, and a recent diagnosis of Parkinson's disease. He also has progressive weight loss over several months, but also has dysphagia related to the Parkinson's, and his diet was fairly limited before recent feeding tube placement.      Upon extensive lab evaluation he was found to have an ESR of 92 on 2/15/21 and this has remained persistently elevated on several checks since.   There has not been clear evidence of infection, rash, joint pain, joint swelling, or headache.  CRP is rising from 12 mg/dL in August to now 180 mg/dL.      2/26/2021 CTs and PET without evidence for recurrence of malignancy.  There were findings of nodular opacities in the left lower lobe with associated endobronchial debris, also with basilar groundglass opacities.  There was also hypermetabolic activity at the anorectal junction with diffuse wall thickening on CT.     3/17/2021 Flex sigmoidoscopy discovered a 4 mm polyp in the distal sigmoid colon that was removed and erythematous mucosa in the entire examined colon that was biopsied. Histopathology noted colonic mucosa with superficial vascular congestion and melanosis coli without evidence for GVHD.  The polyp was a tubular adenoma, negative for high-grade dysplasia.  Full colonoscopy was done 4/14/2021 without new findings.     About once per month, Haresh reported that he would get a low grade fever.  This happened last on 8/15.  He woke up in the night with a temp of 100.9 F and chills.   He thinks this happens when he gets constipated.   He does not get diarrhea, unless he  uses a stool regimen to help with the constipation.  Has not recurred since 8/15.     He can get out of breath with mild exertion, like 1-2 flights of stairs.  He lives in a split level home, and he does get around the half flights fairly well.  He feels drainage down the back of his throat and will cough to clear his throat.  He does not think he has a cough that comes deep from the chest.   His voice volume is lower and he feels hoarse.  He is at risk for aspiration with his dysphagia, now prevented with no food by mouth.     Patient has mounting comorbidities.   Among them the history of GVHD stood out as a potential contributor to elevation of inflammatory markers.  However, the scans in 2/2021 did not suggest ongoing GVHD and the biopsy from the sigmoid was not consistent.  Since there was inflammation throughout the colon, I do wonder if the biopsy missed it. Otherwise,  the pulmonary findings on CT suggest we may be missing an indolent pathogen, perhaps fungal or not-tuberculous mycobacterium.   He was not able to provide an adequate expectorated sputum.     PLAN:     - Bronchoscopy to evaluate for atypical organisms.  Please send full panel including fungal and AFB testing.    - Continue to trend CRP and ESR.   I did not order these myself because patient is getting frequent blood draws, and these can be added by other providers.    - Follow-up in 1 month 12/14/2021 at 4:30PM.        Video-Visit Details    Type of service:  Video Visit    Video Start Time: 4:03 PM  Video End Time:  4:20 PM    17 minutes    Originating Location (pt. Location): Home    Distant Location (provider location):  [facility] [place]     Platform used for Video Visit: Good Jacques MD, MS  Infectious Disease    [facility] billing:  3 total problems addressed, 1 progressing  Reviewed > 3 notes from other providers and recent hospitalization  Reviewed > 3 laboratory results with independent  interpretation  Reviewed CT Chest with independent interpretation

## 2021-11-10 NOTE — Clinical Note
Dr. Hill - Updating you on Haresh's exploratory lap after G tube complications - He is unsure about how/when to start Sinemet and worried about access to you - Would your team reach out? Thanks! Anya Nowak MD

## 2021-11-12 ENCOUNTER — TRANSFERRED RECORDS (OUTPATIENT)
Dept: HEALTH INFORMATION MANAGEMENT | Facility: CLINIC | Age: 71
End: 2021-11-12
Payer: COMMERCIAL

## 2021-11-12 ENCOUNTER — TELEPHONE (OUTPATIENT)
Dept: FAMILY MEDICINE | Facility: CLINIC | Age: 71
End: 2021-11-12
Payer: COMMERCIAL

## 2021-11-12 ENCOUNTER — ANTICOAGULATION THERAPY VISIT (OUTPATIENT)
Dept: FAMILY MEDICINE | Facility: CLINIC | Age: 71
End: 2021-11-12
Payer: COMMERCIAL

## 2021-11-12 ENCOUNTER — HOME INFUSION (PRE-WILLOW HOME INFUSION) (OUTPATIENT)
Dept: PHARMACY | Facility: CLINIC | Age: 71
End: 2021-11-12
Payer: COMMERCIAL

## 2021-11-12 ENCOUNTER — VIRTUAL VISIT (OUTPATIENT)
Dept: PHARMACY | Facility: CLINIC | Age: 71
End: 2021-11-12
Attending: FAMILY MEDICINE
Payer: COMMERCIAL

## 2021-11-12 DIAGNOSIS — Z79.01 LONG TERM CURRENT USE OF ANTICOAGULANT THERAPY: Primary | ICD-10-CM

## 2021-11-12 DIAGNOSIS — E11.22 TYPE 2 DIABETES MELLITUS WITH STAGE 2 CHRONIC KIDNEY DISEASE, WITHOUT LONG-TERM CURRENT USE OF INSULIN (H): ICD-10-CM

## 2021-11-12 DIAGNOSIS — I48.91 ATRIAL FIBRILLATION (H): ICD-10-CM

## 2021-11-12 DIAGNOSIS — E78.5 HYPERLIPIDEMIA WITH TARGET LDL LESS THAN 100: ICD-10-CM

## 2021-11-12 DIAGNOSIS — I10 ESSENTIAL HYPERTENSION: ICD-10-CM

## 2021-11-12 DIAGNOSIS — R13.12 OROPHARYNGEAL DYSPHAGIA: ICD-10-CM

## 2021-11-12 DIAGNOSIS — I48.0 PAROXYSMAL ATRIAL FIBRILLATION (H): ICD-10-CM

## 2021-11-12 DIAGNOSIS — J31.0 CHRONIC RHINITIS: ICD-10-CM

## 2021-11-12 DIAGNOSIS — K21.00 GASTROESOPHAGEAL REFLUX DISEASE WITH ESOPHAGITIS WITHOUT HEMORRHAGE: ICD-10-CM

## 2021-11-12 DIAGNOSIS — E43 SEVERE PROTEIN-CALORIE MALNUTRITION (H): ICD-10-CM

## 2021-11-12 DIAGNOSIS — N18.2 TYPE 2 DIABETES MELLITUS WITH STAGE 2 CHRONIC KIDNEY DISEASE, WITHOUT LONG-TERM CURRENT USE OF INSULIN (H): ICD-10-CM

## 2021-11-12 DIAGNOSIS — G20.A1 PARKINSON DISEASE (H): ICD-10-CM

## 2021-11-12 DIAGNOSIS — N40.1 BENIGN PROSTATIC HYPERPLASIA WITH URINARY RETENTION: ICD-10-CM

## 2021-11-12 DIAGNOSIS — Z78.9 TAKES DIETARY SUPPLEMENTS: ICD-10-CM

## 2021-11-12 DIAGNOSIS — Z93.1 GASTROSTOMY TUBE IN PLACE (H): Primary | ICD-10-CM

## 2021-11-12 DIAGNOSIS — R33.8 BENIGN PROSTATIC HYPERPLASIA WITH URINARY RETENTION: ICD-10-CM

## 2021-11-12 LAB — INR (EXTERNAL): 2 (ref 2–3)

## 2021-11-12 PROCEDURE — 99605 MTMS BY PHARM NP 15 MIN: CPT | Performed by: PHARMACIST

## 2021-11-12 PROCEDURE — 99607 MTMS BY PHARM ADDL 15 MIN: CPT | Performed by: PHARMACIST

## 2021-11-12 RX ORDER — LORATADINE 10 MG/1
10 TABLET, ORALLY DISINTEGRATING ORAL DAILY
COMMUNITY

## 2021-11-12 RX ORDER — METOPROLOL SUCCINATE 25 MG/1
TABLET, EXTENDED RELEASE ORAL
Refills: 0 | OUTPATIENT
Start: 2021-11-12

## 2021-11-12 NOTE — PROGRESS NOTES
ANTICOAGULATION MANAGEMENT     Haresh Rock 71 year old male is on warfarin with therapeutic INR result. (Goal INR 2.0-3.0)    Recent labs: (last 7 days)     11/12/21  1134   INR 2.0       ASSESSMENT     Source(s): Chart Review and Patient/Caregiver Call       Warfarin doses taken: Warfarin taken as instructed    Diet: 5 bottles a day (started on 11-10)     New illness, injury, or hospitalization: (g-tube,SBO from earlier this month/last month)    Medication/supplement changes: None noted    Signs or symptoms of bleeding or clotting: No    Previous INR: Subtherapeutic    Additional findings: possibly need to increase warfarin due to increase in vit K intake due to increased nutrtion via g- tube     PLAN     Recommended plan for ongoing change(s) affecting INR     Dosing Instructions: Continue your current warfarin dose with next INR in 4 days       Summary  As of 11/12/2021    Full warfarin instructions:  2.5 mg every Mon, Thu, Sat; 3.75 mg all other days   Next INR check:  11/16/2021             Telephone call with Haresh who agrees to plan and repeated back plan correctly    Patient to recheck with home meter    Education provided: Goal range and significance of current result, Importance of therapeutic range, Importance of following up at instructed interval, Importance of taking warfarin as instructed and When to seek medical attention/emergency care    Plan made per [facility] anticoagulation protocol    Macrina Preston RN  Anticoagulation Clinic  11/12/2021    _______________________________________________________________________     Anticoagulation Episode Summary     Current INR goal:  2.0-3.0   TTR:  59.5 % (11.7 mo)   Target end date:  Indefinite   Send INR reminders to:  CHELSEA CHILEL    Indications    Long-term (current) use of anticoagulants [Z79.01] [Z79.01]  Atrial fibrillation (H) [I48.91]  Antiphospholipid antibody syndrome (H) (Resolved) [D68.61]  Personal history of DVT (deep vein thrombosis) (Resolved)  [Z86.938]  Atrial fibrillation  unspecified type (H) (Resolved) [I48.91]  Paroxysmal atrial fibrillation (H) [I48.0]           Comments:  JESSICA okay to manage by exception         Anticoagulation Care Providers     Provider Role Specialty Phone number    Ayna Nowak MD Referring Family Medicine [phone]          Macrina Avila RN, BSN, PHN  Anticoagulation Nurse  [phone]

## 2021-11-12 NOTE — Clinical Note
David Nelson - you saw this patient last year, he was referred my way, new placement of g-tube. He's still not on any parkinson's medications. Let me know if you'd still like to follow-up with him too.  Thanks!    Emi Chen, PharmD  Medication Therapy Management Pharmacist  [phone]

## 2021-11-12 NOTE — Clinical Note
JOSEFINA COELLO note, thanks!    Emi Chen, PharmD  Medication Therapy Management Pharmacist  [phone]

## 2021-11-12 NOTE — PROGRESS NOTES
Medication Therapy Management (MTM) Encounter    ASSESSMENT:                            Medication Adherence/Access: No issues identified    G-tube/Small bowel obstruction, gastrostomy tube in place/oropharyngeal dysphagia/recurrent aspiration: Reviewed meds with patient, ok to keep taking several that he is by mouth or crushing    BPH:  See plan    Parkinson's disease: will discuss in future.     Afib/Hypertension: may need monitoring of blood pressure after changing metoprolol formulations/administration. Can also consider changing to metoprolol tartrate 12.5 mg twice daily (by mouth or crush), if ER capsule not covered. Administration of Flecanide with dairy can decrease absorption.     Type 2 Diabetes: Stable. A1C at goal <7%.     Hyperlipidemia: Stable.  Patient is on high intensity statin which is indicated based on 2019 ACC/AHA guidelines for lipid management.      GERD: Stable. PPI not necessary.    Bowels:  Plan in place.     Allergic Rhinitis: Stable.     Pain: Stable.     Supplements: Stable.     PLAN:                            1. Talk to Dr. Hawley about an alternative to Avodart, called finasteride (Proscar) - this is considered a hazardous drug to people of child bearing ages, if you do switch to finasteride, I would recommend that only you touch this drug and swallow whole if possible, it is not recommended to crush this tablet.     2. Flecanaide can have reduced absorption when taken with milk. If your feedings are milk based, you would want to take this 30 min to 1 hour before tube feedings.  Generally, do mix meds with tube feedings    3. It's up to you if you'd like to start taking the omeprazole. Since you aren't having much heartburn, no nausea, and you are not vomiting, I don't think you need to take this, but can if you want to. This also comes as a suspension - let us know if you'd like to change to something like this.     Follow-up: Return in about 2 weeks (around 11/26/2021) for Medication  Therapy Management.      SUBJECTIVE/OBJECTIVE:                          Haresh Rock is a 71 year old male called for an initial visit. He was referred to me from Anya Nowak for G tube management - need alternatives to metoprolol XL 25 mg and avodart 0.5 mg, see phone enconter dated 11/8/21.      Reason for visit: Feeding G-tube placed 2 weeks ago. He can still take some by mouth. Some crushes.     Allergies/ADRs: Reviewed in chart  Past Medical History: Reviewed in chart  Tobacco: He reports that he has never smoked. He has never used smokeless tobacco.  Alcohol: none  Caffeine: mountain dew code red    Medication Adherence/Access: mountain dew code red, mixes with thick-it so he can swallow it better. He takes his medications with this.    Crushing pills is a bit of a hassle. He'd prefer to just swallow them, which he is able to for most of them.   Morning pills 9:30 am, moving towards getting up earlier and starting first feeding at 8 am.     G-tube/Small bowel obstruction, gastrostomy tube in place/oropharyngeal dysphagia/recurrent aspiration:   Biotine spray swish and spit 1-2 mLs QID PRN - he doesn't prefer this, worried about aspirating this.     Difficulty swallowing due to parkinson's disease. Hospitalized in August with pneumonia due to aspiration, afterward the G-tube was recommended. It was a possibility that had been discussed for ~ 1 year, but has only been placed in the last two weeks (10/25/21). Can still swallow, just difficult especially with food. Planning to have a treat by mouth now and then to enjoy something.  During placement of the G-tube, the small intestine was punctured, he had emergency surgery to correct this, never developed any infection, he's been home a week now.    He does feedings himself, 5 cartons/day.    BPH:    Hasn't been taking dutasteride 0.5 mg daily the last two weeks. He can swallow this one, but was wondering if there was an alternative. He sees Dr. Hawley  next week.   Daughter helps sometimes, 40 years old.     Parkinson's disease:   Not currently on anything, did not discuss in depth./    Afib/Hypertension:   Flecainide 50 mg twice daily (crushes in evening and and takes by mouth in the morning)  Metoprolol ER 25 mg daily (see below)  Warfarin per [facility] - currently being adjusted due to formula tube feedings. He checks INR at home.      Still taking Metoprolol ER 25 tablets, new prescription at pharmacy for capsules. He hasn't picked up yet. Plans to administer capsules as follows:  open capsule and add contents to an all plastic oral tip syringe; add 15 mL of water. Gently shake the syringe for ~10 seconds. Immediately deliver mixture through tube. No granules should remain in the syringe; rinse syringe with additional water if necessary.  Patient does not have a history of GI bleed.   Patient reports no current medication side effects.  He does monitor blood pressure at home, today: 102/61, 75.  INR   Date Value Ref Range Status   07/16/2021 1.9 (A) 2 - 3 Final     INR (External)   Date Value Ref Range Status   11/12/2021 2.0 2 - 3 Final     BP Readings from Last 3 Encounters:   11/10/21 114/66   11/04/21 135/71   10/27/21 (!) 157/83     Type 2 Diabetes:   No current medications.  Urine Albumin:   Lab Results   Component Value Date    UMALCR 62.45 (H) 07/27/2021      Lab Results   Component Value Date    A1C 5.5 08/20/2021    A1C 5.4 05/25/2021    A1C 5.5 01/15/2021    A1C 6.3 07/20/2020    A1C 6.9 04/23/2019    A1C 6.4 01/25/2018     Hyperlipidemia:  rosuvastatin 40 mg daily in evening, crushing    Patient reports no significant myalgias or other side effects.  Recent Labs   Lab Test 08/20/21  0752 01/15/21  0817 04/18/16  1659 11/16/15  1155 07/09/14  1511   CHOL 102 110   < > 126 168   HDL 40 36*   < > 31* 29*   LDL 50 55   < > 71 104   TRIG 61 96   < > 119 174*   CHOLHDLRATIO  --   --   --  4.1 5.8*    < > = values in this interval not displayed.      GERD:   Tums 500 mg chewables twice daily PRN (only needed once this week so far)    Omeprazole 20 mg daily - new he hasn't started this yet, prescribed for hematemesis right after his stomach tube was placed. Previously was on pantoprazole, he stopped it last summer because he didn't feel he needed this. No longer having hematemesis, and he'll plan to use if needed for heartburn. No nausea - hasn't needed the Zofran ODT.      Bowels:    Senna-S 2 tablets daily PRN, by mouth (was on prior to g-tube placement). He's taken it a couple times this week, planning to again today.  Bowels haven't been back to normal completely, but he's not having true constipation. Just not as many bowel movements.    Allergic Rhinitis:   fluticasone nasal spray 1 spray(s) once daily PRN.   Loratadine 10 mg daily PRN (takes most days) - he has the dissolvable kind.     Patient feels that current therapy is effective.     Pain:   Acetaminophen 1000 mg daily PRN - by mouth or crush.     States this/these are effective, pain is minimal from incision where g tube was placed, surgery. Denies side effects.     Supplements:   Multivitamin daily - gummy   Vitamin D 50 mcg daily, capsule, by mouth    Chewing the multivitamin caused more aspiration due to the chalky nature of them, so he switched to the chewable gummies. (his Tums are not chalky).  Vitamin D Deficiency Screening Results:  Lab Results   Component Value Date    VITDT 19 (L) 06/08/2021    VITDT 18 (L) 02/15/2021    No reported issues at this time. .      Today's Vitals: There were no vitals taken for this visit.  ----------------  Post Discharge Medication Reconciliation Status: discharge medications reconciled, continue medications without change.    I spent 55 minutes with this patient today. All changes were made via collaborative practice agreement with Anya Nowak MD. A copy of the visit note was provided to the patient's primary care provider.    The patient was sent via  [facility] a summary of these recommendations.     Emi Chen, PharmD  Medication Therapy Management Pharmacist  [phone]    Telemedicine Visit Details  Type of service:  Telephone visit  Start Time: 10:02 AM  End Time: 10:57 AM  Originating Location (patient location): Home  Distant Location (provider location):  [facility]     Medication Therapy Recommendations  Atrial fibrillation (H)    Current Medication: flecainide (TAMBOCOR) 50 MG tablet   Rationale: Incorrect administration - Dosage too low - Effectiveness   Recommendation: Change Administration Time   Status: Patient Agreed - Adherence/Education         Benign prostatic hyperplasia with urinary retention    Current Medication: dutasteride (AVODART) 0.5 MG capsule   Rationale: Does not understand instructions - Adherence - Adherence   Recommendation: Provide Education   Status: Patient Agreed - Adherence/Education

## 2021-11-12 NOTE — TELEPHONE ENCOUNTER
Rowena PT with [facility] called the clinic requesting verbal orders for home care PT: 1 time X 3 weeks for balance and gait training.    Gave okay for verbal orders for Home care Physical therapy as requested.    Per [facility] protocol/Policies: Elana Sandoval, RN for Dr. Nowak  Patient who has bee seen by [facility] primary care provider within the past 2 years (11/10/21)

## 2021-11-12 NOTE — PATIENT INSTRUCTIONS
Recommendations from today's MTM visit:                                                       1. Talk to Dr. Hawley about an alternative to Avodart, called finasteride (Proscar) - this is considered a hazardous drug to people of child bearing ages, if you do switch to finasteride, I would recommend that only you touch this drug and swallow whole if possible, it is not recommended to crush this tablet.     2. Flecanaide can have reduced absorption when taken with milk. If your feedings are milk based, you would want to take this 30 min to 1 hour before tube feedings.  Generally, do mix meds with tube feedings    3. It's up to you if you'd like to start taking the omeprazole. Since you aren't having much heartburn, no nausea, and you are not vomiting, I don't think you need to take this, but can if you want to. This also comes as a suspension - let us know if you'd like to change to something like this.    Follow-up: Return in about 2 weeks (around 11/26/2021) for Medication Therapy Management.    It was great to speak with you today.  I value your experience and would be very thankful for your time with providing feedback on our clinic survey. You may receive a survey via email or text message in the next few days.     To schedule another MTM appointment, please call the clinic directly or you may call the MTM scheduling line at [phone] or toll-free at [phone].     My Clinical Pharmacist's contact information:                                                      Please feel free to contact me with any questions or concerns you have.      Emi Chen, PharmD  Medication Therapy Management Pharmacist  [phone]

## 2021-11-13 NOTE — DISCHARGE SUMMARY
[facility]  Discharge Summary - Medicine & Pediatrics       Date of Admission:  10/29/2021  Date of Discharge:  11/4/2021  1:38 PM  Discharging Provider: Emy Garcia  Discharge Service: [facility]    Discharge Diagnoses   Small bowel obstruction secondary to trans-enteric G tube placement s/p small bowel resection and G tube replacement  Paroxysmal atrial fibrillation  History of DVT  Antiphospholipid antibody syndrome  Dysphagia  Parkinson disease    Follow-ups Needed After Discharge   Follow-up Appointments     Adult [facility] Follow-up and recommended labs and tests      Follow up with primary care provider, Anya Nowak, within 7 days for   hospital follow- up.  The following labs/tests are recommended: INR, BMP,   Mg, Phos    Appointments on [place] and/or [facility] (with [facility] or [facility]   provider or service). Call [phone] if you haven't heard regarding   these appointments within 7 days of discharge.         Follow Up and recommended labs and tests      INR check tomorrow. Fax to KELSEY Raman   Will hold dose of warfarin today and re-dose warfarin pending INR check   tomorrow.             Unresulted Labs Ordered in the Past 30 Days of this Admission     No orders found from 9/29/2021 to 10/30/2021.        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 The following problems were addressed during his  hospitalization:     Small bowel obstruction secondary to trans-enteric G tube placement s/p small bowel resection and G tube replacement  S/p diagnostic laparoscopy, small bowel resection, abdominal washout, and insertion of G tube on 10/30. IR discussed case with family 10/30. Initially on Zosyn due to concern for intraabdominal infection, discontinued post-operatively. At discharge, recovering appropriately post-operatively and tolerating tube feeds at goal.   - Continue bolus gravity  TF   - NPO due to dysphagia     Paroxysmal atrial fibrillation  Hx of DVT  Antiphospholipid antibody syndrome  CHADS2-VASC score is 2 (age, HTN). Well controlled at home. Anticoagulation held for operation, restarted 10/31 with warfarin bridged to goal (goal INR 2-3). INR supratherapeutic at 3.95 at discharge.   - Hold warfarin today, repeat INR tomorrow and follow up with anticoagulation clinic for further warfarin dosing  - Continue PTA flecainide 50 mg BID  - Continue PTA metoprolol succinate 25 mg qd     Dysphagia  In setting of Parkinson's disease. Per SLP review, most recent VFSS completed as an OP on 9/15/21 showed progressive, severe dysphagia with aspiration noted during and after the swallow. Pt was discharged from OP SLP due to limited expected progress.  - NPO, ice chips okay with risks communicated with patient  - Biotene oral spray  - PTA Claritin     Parkinson disease  Following with outpatient neurology, not on carbidopa-levodopa. Evaluated by inpatient neurology due to family request for second opinion. Findings on exam are consistent with Parkinsonism. Outpatient JERO scan has confirmed a presynaptic dopaminergic deficit bilaterally.   - Follow up with outpatient neurology in 1 month with Dr. Hill  - Consider Sinemet trial 1-2 weeks after discharge     Consultations This Hospital Stay   SURGERY GENERAL ADULT IP CONSULT  PHARMACY IP CONSULT  PHARMACY IP CONSULT  CARDIOLOGY GENERAL ADULT IP CONSULT  WOUND OSTOMY  "CONTINENCE NURSE  IP CONSULT  PHARMACY TO DOSE WARFARIN  NUTRITION SERVICES ADULT IP CONSULT  SPEECH LANGUAGE PATH ADULT IP CONSULT  PHYSICAL THERAPY ADULT IP CONSULT  OCCUPATIONAL THERAPY ADULT IP CONSULT  PHARMACY IP CONSULT  PHARMACY IP CONSULT  NEUROLOGY GENERAL ADULT IP CONSULT  CARE MANAGEMENT / SOCIAL WORK IP CONSULT  NUTRITION SERVICES ADULT IP CONSULT    Code Status   Prior       The patient was discussed with Dr. Garcia.  MD [facility]  [facility] [address] [address]  Phone: [phone]  ______________________________________________________________________    Physical Exam   Vital Signs: /71 (BP Location: Right arm)   Pulse 77   Temp 97.5  F (36.4  C) (Oral)   Resp 18   Ht 1.804 m (5' 11.02\")   Wt 62.5 kg (137 lb 12.8 oz)   SpO2 98%   BMI 19.21 kg/m    Weight: 137 lbs 12.8 oz  Gen: lying in bed, in NAD  CV: regular rate, well perfused  Pulm: no increased WOB on RA  GI: soft, ND, nontender to palpation around G tube site. Dressing in place c/d/i.  Neuro: alert, conversant, responds to questions appropriately  Ext: no bilateral peripheral extremity edema, warm  Psych: calm      Primary Care Physician   Anya Nowak    Discharge Orders      INR    Fax to  Danisha     Home Care PT Referral for Hospital Discharge      Home care nursing referral      Home infusion referral      Care Coordination Referral      MD face to face encounter    Documentation of Face to Face and Certification for Home Health Services    I certify that patient: Haresh Rock is under my care and that I, or a nurse practitioner or physician's assistant working with me, had a face-to-face encounter that meets the physician face-to-face encounter requirements with this patient on: November 3, 2021.    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    I certify that, based on my findings, the following services are medically necessary home health services: Nursing and Physical Therapy.    My clinical findings support the need for the above services because: Nurse is needed: To assess signs and symptoms after changes in medications or other medical regimen and to provide caregiver training to assist with: enteral nutrition and Physical Therapy Services are needed to assess and treat the following functional impairments: medical status and deconditioning.    Further, I certify that my clinical findings support that this patient is homebound (i.e. absences from home require considerable and taxing effort and are for medical reasons or [facility] services or infrequently or of short duration when for other reasons) because: Requires assistance of another person or specialized equipment to access medical services because patient: Requires supervision of another for safe transfer.    Based on the above findings. I certify that this patient is confined to the home and needs intermittent skilled nursing care, physical therapy and/or speech therapy.  The patient is under my care, and I have initiated the establishment of the plan of care.  This patient will be followed by a physician who will periodically review the plan of care.  Physician/Provider to provide follow up care: Anya Nowak    Attending hospital physician  (the Medicare certified [facility] provider): Emy Garcia, *  Physician Signature: See electronic signature associated with these discharge orders.  Date: 11/3/2021     Reason for your hospital stay    You were admitted for abdominal pain due to G tube malpositioning. You underwent surgery with repositioning of your G tube and have been recovering appropriately after surgery. You were also evaluated by neurology during your admission with plans for follow up for your Parkinson's. Your INR was a little high at discharge, and we will plan to recheck your INR at home to make sure your INR stays within range.     Activity    Your activity upon discharge: activity as tolerated     Adult [facility] Follow-up and recommended labs and tests    Follow up with primary care provider, Anya Nowak, within 7 days for hospital follow- up.  The following labs/tests are recommended: INR, BMP, Mg, Phos    Appointments on [place] and/or [facility] (with [facility] or [facility] provider or service). Call [phone] if you haven't heard regarding these appointments within 7 days of discharge.     Follow Up and recommended labs and tests    INR check tomorrow. Fax to KELSEY Raman   Will hold dose of warfarin today and re-dose warfarin pending INR check tomorrow.     Diet    Follow this diet upon discharge: Orders Placed This Encounter      Adult Formula Bolus Feeding: TID Osmolite 1.5; Route: Gastrostomy; 6; Can(s); Medication - Feeding Tube Flush Frequency: At least 15-30 mL water before and after medication administration and with tube clogging; Amount to Send (Nutrition use): Per...      NPO Except for: Ice Chips, NPO but receiving Tube Feeding, Other; Specify: Witnessed ice chips okay (shared risk decisio making       Significant Results and Procedures   Results for orders placed or performed during the hospital encounter of 10/29/21   CT Abdomen Pelvis w/o Contrast     Value    Radiologist flags (Urgent)     Small bowel  obstruction with suspected trans-enteric    Narrative    EXAMINATION: CT ABDOMEN PELVIS W/O CONTRAST, 10/29/2021 3:08 PM    TECHNIQUE:  Helical CT images from the lung bases through the  symphysis pubis were obtained with contrast.  Coronal and sagittal  reformatted images were generated at a workstation for further  assessment.    CONTRAST:  92 ml Isovue 370.    COMPARISON: IR gastrostomy tube placement 10/25/2021, PET/CT 2/26/2021    HISTORY: Abdominal distension    FINDINGS:    Lines and tubes: Percutaneous gastrostomy tube with retention balloon  in the stomach. Additional details below.    Liver: Similar hypoplastic left hepatic lobe. No suspicious lesions.  Portal veins appear patent.  Gallbladder: Cholelithiasis without evidence of acute cholecystitis.  Mild intra and extrahepatic biliary ductal dilatation, likely related  to the bowel obstruction described below.  Spleen: Small splenule near the hilum, otherwise unremarkable.  Pancreas: Fatty atrophy with dilatation of the main pancreatic duct,  likely related to the bowel obstruction described below.  Adrenal glands: Nodular thickening without discrete nodules.  Kidneys: No hydronephrosis. No nephrolithiasis. No suspicious mass.  Benign cyst in the right midpole.  Bladder / Pelvic organs: Bladder is within normal limits. No pelvic  mass.  Bowel: Significantly dilated stomach and small bowel with air-fluid  levels and focal transition point in the left upper quadrant at the  G-tube site, with small bowel transposed between the anterior  abdominal wall and the anchored stomach, with suspected through and  through course of the G-tube through the small bowel (series 3, image  166; series 4, image 13). The remaining distal small bowel is  decompressed. No pneumatosis or portal venous gas. The appendix is  unremarkable. Colonic diverticulosis without evidence of acute  diverticulitis. Retained enteric contrast predominantly in the  obstructed small bowel loops  from recent G-tube placement.  Lymph nodes: Similar prominent retroperitoneal lymph nodes. No new  suspicious suspicious lymphadenopathy.  Peritoneum / Retroperitoneum: No pneumoperitoneum. Trace free fluid in  the abdomen and pelvis. Scattered mesenteric haziness/stranding. Fat  containing inguinal hernias.  Abdominal vasculature: Major vascular structures of the abdomen are  patent and normal in caliber. Aortoiliac atherosclerosis.    Bones and soft tissues: Degenerative changes of the visualized spine.  No acute osseous abnormalities or suspicious bony lesions. Bilateral  gynecomastia.    Lower thorax: Groundglass as well as tree-in-bud opacities in the  bilateral lower lobes and dependent right middle lobe. No pleural  effusion. No cardiomegaly or significant pericardial effusion.  Coronary artery calcifications. Aortic valvular calcifications. Fluid  in the visualized distal esophagus with circumferential esophageal  wall thickening.      Impression    IMPRESSION:   1. Small bowel obstruction at the G-tube site in the left upper  quadrant with significant gastric distention and suspected through and  through course of the G-tube through the small bowel. No definite  pneumoperitoneum, although there is trace free fluid in the abdomen.  No other evidence of bowel complication.  2. Groundglass as well as tree-in-bud opacities in the bilateral lower  lobes and dependent right middle lobe, suspected aspiration versus  infection.  3. Mild intra and extra hepatic biliary ductal dilatation with  additional dilation of the main pancreatic duct, likely due to the  above obstruction.  4. Cholelithiasis without evidence of acute cholecystitis.  5. The remainder of the exam is not significantly changed since  2/26/2021.    [Urgent Result: Small bowel obstruction with suspected trans-enteric  course of the G tube through small bowel]    Finding was identified on 10/29/2021 3:11 PM.     Dr. Syed was contacted by   [facility] at 10/29/2021 3:26 PM and  verbalized understanding of the urgent finding.     I have personally reviewed the examination and initial interpretation  and I agree with the findings.    OLAMIDE AMEZQUITA MD         SYSTEM ID:  [id number]     *Note: Due to a large number of results and/or encounters for the requested time period, some results have not been displayed. A complete set of results can be found in Results Review.       Discharge Medications   Discharge Medication List as of 11/4/2021 12:44 PM      START taking these medications    Details   artificial saliva (BIOTENE MT) SOLN solution Swish and spit 1-2 mLs (1-2 sprays) in mouth 4 times daily, Disp-240 mL, R-0, E-Prescribe      carboxymethylcellulose PF (REFRESH PLUS) 0.5 % ophthalmic solution Place 2 drops Into the left eye every hour as needed for dry eyes, Disp-30 each, R-0, E-Prescribe         CONTINUE these medications which have CHANGED    Details   warfarin ANTICOAGULANT (COUMADIN) 2.5 MG tablet Hold warfarin today, restart after INR check tomorrow, Disp-90 tablet, R-3, Historical         CONTINUE these medications which have NOT CHANGED    Details   acetaminophen (TYLENOL) 500 MG tablet 500mg tab as needed, Historical      calcium carbonate (TUMS) 500 MG chewable tablet Take 1 chew tab by mouth daily as needed for heartburn, Historical      dutasteride (AVODART) 0.5 MG capsule Take 1 capsule (0.5 mg) by mouth daily, Disp-90 capsule, R-3, E-Prescribe      flecainide (TAMBOCOR) 50 MG tablet TAKE 1 TABLET BY MOUTH TWICE A DAY, Disp-180 tablet, R-0, E-Prescribe      fluticasone (FLONASE) 50 MCG/ACT nasal spray Spray 1 spray into both nostrils daily as needed for rhinitis, Disp-16 g, R-11, E-Prescribe      omeprazole (PRILOSEC) 20 MG DR capsule Take 1 capsule (20 mg) by mouth every morning, Disp-30 capsule, R-0, E-Prescribe      ondansetron (ZOFRAN-ODT) 4 MG ODT tab Take 1 tablet (4 mg) by mouth every 6 hours as needed for nausea, Disp-30 tablet,  R-0, E-Prescribe      rosuvastatin (CRESTOR) 40 MG tablet Take 1 tablet (40 mg) by mouth At Bedtime, Disp-90 tablet, R-3, Historical      senna-docusate (SENOKOT-S/PERICOLACE) 8.6-50 MG tablet Take 2 tablets by mouth daily as needed for constipation, Historical      Starch-Maltodextrin (THICK-IT PO) Historical      vitamin D3 (CHOLECALCIFEROL) 50 mcg (2000 units) tablet Take 1 tablet (50 mcg) by mouth daily, Historical      loratadine (CLARITIN) 10 MG tablet Take 10 mg by mouth daily as needed , Historical      metoprolol succinate ER (TOPROL XL) 25 MG 24 hr tablet Take 1 tablet (25 mg) by mouth daily In the evening, Disp-90 tablet, R-3, E-Prescribe      multivitamin (CENTRUM SILVER) tablet Take 1 tablet by mouth daily , Historical      Pseudoeph-Doxylamine-DM-APAP (NYQUIL PO) As needed, Historical      PREVIDENT 5000 DRY MOUTH 1.1 % GEL topical gel Take by mouth daily, R-3, KRIS, Historical         STOP taking these medications       carbidopa-levodopa (SINEMET)  MG tablet Comments:   Reason for Stopping:         levofloxacin (LEVAQUIN) 500 MG tablet Comments:   Reason for Stopping:             Allergies   Allergies   Allergen Reactions     Seasonal Allergies

## 2021-11-15 ENCOUNTER — HOME INFUSION (PRE-WILLOW HOME INFUSION) (OUTPATIENT)
Dept: PHARMACY | Facility: CLINIC | Age: 71
End: 2021-11-15
Payer: COMMERCIAL

## 2021-11-15 NOTE — PROGRESS NOTES
This is a recent snapshot of the patient's [facility] medical record.  For current drug dose and complete information and questions, call [phone] or In Basket pool, [facility] home infusion ([id number])  CSN Number:  [id number]

## 2021-11-16 ENCOUNTER — OFFICE VISIT (OUTPATIENT)
Dept: UROLOGY | Facility: CLINIC | Age: 71
End: 2021-11-16
Payer: COMMERCIAL

## 2021-11-16 ENCOUNTER — ANTICOAGULATION THERAPY VISIT (OUTPATIENT)
Dept: FAMILY MEDICINE | Facility: CLINIC | Age: 71
End: 2021-11-16

## 2021-11-16 ENCOUNTER — TRANSFERRED RECORDS (OUTPATIENT)
Dept: HEALTH INFORMATION MANAGEMENT | Facility: CLINIC | Age: 71
End: 2021-11-16

## 2021-11-16 ENCOUNTER — HOME INFUSION (PRE-WILLOW HOME INFUSION) (OUTPATIENT)
Dept: PHARMACY | Facility: CLINIC | Age: 71
End: 2021-11-16

## 2021-11-16 VITALS — OXYGEN SATURATION: 99 % | HEART RATE: 85 BPM

## 2021-11-16 DIAGNOSIS — I48.0 PAROXYSMAL ATRIAL FIBRILLATION (H): ICD-10-CM

## 2021-11-16 DIAGNOSIS — I48.91 ATRIAL FIBRILLATION (H): ICD-10-CM

## 2021-11-16 DIAGNOSIS — Z79.01 LONG TERM CURRENT USE OF ANTICOAGULANT THERAPY: Primary | ICD-10-CM

## 2021-11-16 DIAGNOSIS — N40.1 BENIGN PROSTATIC HYPERPLASIA WITH URINARY RETENTION: Primary | ICD-10-CM

## 2021-11-16 DIAGNOSIS — R33.8 BENIGN PROSTATIC HYPERPLASIA WITH URINARY RETENTION: Primary | ICD-10-CM

## 2021-11-16 LAB — INR (EXTERNAL): 2 (ref 0.9–1.1)

## 2021-11-16 PROCEDURE — 99213 OFFICE O/P EST LOW 20 MIN: CPT | Mod: 25 | Performed by: UROLOGY

## 2021-11-16 PROCEDURE — 51798 US URINE CAPACITY MEASURE: CPT | Performed by: UROLOGY

## 2021-11-16 NOTE — PROGRESS NOTES
ANTICOAGULATION MANAGEMENT     Haresh Rock 71 year old male is on warfarin with therapeutic INR result. (Goal INR 2.0-3.0)    Recent labs: (last 7 days)     11/16/21  1401   INR 2.0*       ASSESSMENT     Source(s): Chart Review and Patient/Caregiver Call       Warfarin doses taken: Warfarin taken as instructed    Diet: Went up to 6 bottles/day for TF    New illness, injury, or hospitalization: No    Medication/supplement changes: None noted    Signs or symptoms of bleeding or clotting: No    Previous INR: Therapeutic last visit; previously outside of goal range    Additional findings: None     PLAN     Recommended plan for ongoing change(s) affecting INR     Dosing Instructions: Continue your current warfarin dose with next INR in 3 days       Summary  As of 11/16/2021    Full warfarin instructions:  2.5 mg every Mon, Thu, Sat; 3.75 mg all other days   Next INR check:  11/19/2021             Telephone call with Haresh who verbalizes understanding and agrees to plan    Patient to recheck with home meter    Education provided: Goal range and significance of current result    Plan made per [facility] anticoagulation protocol    Laverne Ferguson RN  Anticoagulation Clinic  11/16/2021    _______________________________________________________________________     Anticoagulation Episode Summary     Current INR goal:  2.0-3.0   TTR:  59.5 % (11.7 mo)   Target end date:  Indefinite   Send INR reminders to:  CHELSEA CHILEL    Indications    Long-term (current) use of anticoagulants [Z79.01] [Z79.01]  Atrial fibrillation (H) [I48.91]  Antiphospholipid antibody syndrome (H) (Resolved) [D68.61]  Personal history of DVT (deep vein thrombosis) (Resolved) [Z86.718]  Atrial fibrillation  unspecified type (H) (Resolved) [I48.91]  Paroxysmal atrial fibrillation (H) [I48.0]           Comments:  JESSICA rodas to manage by exception         Anticoagulation Care Providers     Provider Role Specialty Phone number    Anya Nowak MD  [facility] [phone]

## 2021-11-16 NOTE — PROGRESS NOTES
Chief Complaint   Patient presents with     RECHECK       Haresh PHAN Pranav is a 71 year old male who presents today for follow up of   Chief Complaint   Patient presents with     RECHECK    f/u post TURP for retention of urine.  He has no urinary problems since surgery.    Current Outpatient Medications   Medication Sig Dispense Refill     acetaminophen (TYLENOL) 500 MG tablet Take 1,000 mg by mouth 2 times daily as needed        artificial saliva (BIOTENE MT) SOLN solution Swish and spit 1-2 mLs (1-2 sprays) in mouth 4 times daily 240 mL 0     calcium carbonate (TUMS) 500 MG chewable tablet Take 1 chew tab by mouth 2 times daily as needed for heartburn        flecainide (TAMBOCOR) 50 MG tablet TAKE 1 TABLET BY MOUTH TWICE A  tablet 0     fluticasone (FLONASE) 50 MCG/ACT nasal spray Spray 1 spray into both nostrils daily as needed for rhinitis 16 g 11     loratadine (CLARITIN REDITABS) 10 MG ODT Take 10 mg by mouth daily as needed for allergies       Metoprolol Succinate (KAPSPARGO) 25 MG CS24 1 capsule by Tube route daily . Open capsule and add contents to an all plastic oral tip syringe; add 15 mL of water. Gently shake the syringe for ~10 seconds. Immediately deliver mixture through tube. No granules should remain in the syringe; rinse syringe with additional water if necessary. 90 capsule 3     Multiple Vitamins-Minerals (MULTIVITAMIN GUMMIES ADULT PO) Take 1 each by mouth daily       omeprazole (PRILOSEC) 20 MG DR capsule Take 1 capsule (20 mg) by mouth every morning (Patient taking differently: Take 20 mg by mouth daily as needed ) 30 capsule 0     ondansetron (ZOFRAN-ODT) 4 MG ODT tab Take 1 tablet (4 mg) by mouth every 6 hours as needed for nausea 30 tablet 0     PREVIDENT 5000 DRY MOUTH 1.1 % GEL topical gel Apply to affected area daily   3     rosuvastatin (CRESTOR) 40 MG tablet Take 1 tablet (40 mg) by mouth At Bedtime 90 tablet 3     senna-docusate (SENOKOT-S/PERICOLACE) 8.6-50 MG tablet Take 2  tablets by mouth daily as needed for constipation       Starch-Maltodextrin (THICK-IT PO) Mixes with mountain dew code red to take pills with.       vitamin D3 (CHOLECALCIFEROL) 50 mcg (2000 units) tablet Take 1 tablet (50 mcg) by mouth daily       warfarin ANTICOAGULANT (COUMADIN) 2.5 MG tablet Hold warfarin today, restart after INR check tomorrow 90 tablet 3     Allergies   Allergen Reactions     Seasonal Allergies       Past Medical History:   Diagnosis Date     Abnormal dopamine scan (DaTSCAN) 2020 11/04/2020    [phone] 11/3/2020   Narrative & Impression   Examination: Nuclear medicine DATscan for Dopamine Receptor Localization.   Examination: NM Brain Image Tomographic (SPECT) DATscan   Date: 11/3/2020 3:21 PM   Indication: Parkinsonism   Comparison: None   Additional Information: none   Interfering Medications: None   Technique:   The patient initially received 1 ml of Lugol's solution orally prior     Antiphospholipid antibody syndrome (H)     Ravi's, noted negative per testing re-done in 2008 in hematology note 01/13/2009     Articulation disorder 09/23/2020     Atrial fibrillation (H) 04/2011     Benign prostatic hyperplasia with urinary retention      Cirrhosis (H)      CKD (chronic kidney disease) stage 2, GFR 60-89 ml/min      Diabetes mellitus type II     steroid induced     DJD (degenerative joint disease) of knee     SHAWN, worse on right     DVT (deep venous thrombosis) (H)      ED (erectile dysfunction)      Gallbladder polyp 05/2010     Vbrrr-Dbslju-Kbrf Disease 2007    BMT     Hemochromatosis      Hodgkin's disease NOS     5 cycles of ABVD in 2011     HTN (hypertension)      Hyperlipidaemia LDL goal <100      Multiple thyroid nodules     benign      Myeloproliferative disorder (H)     atypical, [facility]     Parkinson disease (H) 09/24/2020     PE (pulmonary embolism) 11/2004     Polyneuropathy      Pressure ulcer      Primary pulmonary hypertension (H)      Severe protein-calorie malnutrition  (H) 11/10/2021     Small bowel obstruction (H) 10/29/2021     Thrombocytopenia (H) 06/08/2021     Past Surgical History:   Procedure Laterality Date     ANESTHESIA CARDIOVERSION  04/21/2011    Procedure:ANESTHESIA CARDIOVERSION; Surgeon:GENERIC ANESTHESIA PROVIDER; Location:[facility] OR     ARTHROSCOPY KNEE RT/LT      LT     CATARACT IOL, RT/LT  2017     COLONOSCOPY  10/16/2012    Procedure: COLONOSCOPY;  Colonoscopy, screening;  Surgeon: Reg Feliciano MD;  Location: [facility] OR     COLONOSCOPY N/A 04/14/2021    Procedure: COLONOSCOPY;  Surgeon: Reg Holm MD;  Location: [facility] GI     ESOPHAGOSCOPY, GASTROSCOPY, DUODENOSCOPY (EGD), COMBINED N/A 10/07/2020    Procedure: ESOPHAGOGASTRODUODENOSCOPY, WITH BIOPSY;  Surgeon: Raul Sawyer DO;  Location: [facility] OR     H ABLATION FOCAL AFIB  03/05/2013    PVI     H ABLATION FOCAL AFIB  01/01/2018     HC BONE MARROW/STEM TRANSPLANT ALLOGENIC  05/08/2007     INSERT PORT VASCULAR ACCESS       JOINT REPLACEMTN, KNEE RT/LT  03/28/2012    SHAWN     LAPAROSCOPY DIAGNOSTIC (GENERAL) N/A 10/29/2021    Procedure: LAPAROSCOPY, DIAGNOSTIC, TAKEDOWN OF MALPOSITIONED GASTROSTOMY TUBE, INSERTION OF GASTROSTOMY TUBE, SMALL BOWEL RESECTION X1, PRIMARY REPAIR OF SMALL BOWEL ENTEROTOMY, ABDOMINAL WASHOUT, TAP BLOCK;  Surgeon: Leif Finney DO;  Location: [facility] OR     PEG TUBE PLACEMENT  10/25/2021     VASECTOMY  1990     Family History   Problem Relation Age of Onset     Hypertension Mother      Cerebrovascular Disease Mother      Breast Cancer Mother      Arrhythmia Brother      Arrhythmia Sister         supraventricular tachycardia     Thyroid Disease Sister      Other - See Comments Son         lives in [place]. no kids and not .     Obsessive Compulsive Disorder Son      Depression Son      Eating Disorder Son         eats when depressed     Obesity Son      Thyroid Cancer Daughter         had thyroid removed     Bipolar Disorder Daughter      Other - See Comments  Daughter         lives in [place] - single with one son 9  yrs     Other - See Comments Sister      Alzheimer Disease Father      Diabetes Paternal Aunt      Gastrointestinal Disease Maternal Grandmother         colitis      Parkinsonism Other         2 cousins with parkinson     C.A.D. No family hx of      Social History     Socioeconomic History     Marital status:      Spouse name: Angelica     Number of children: 2     Years of education: 21     Highest education level: None   Occupational History     Occupation:      Employer: [employer]     Employer: [employer]   Tobacco Use     Smoking status: Never Smoker     Smokeless tobacco: Never Used   Substance and Sexual Activity     Alcohol use: No     Drug use: No     Sexual activity: Not Currently     Partners: Female   Other Topics Concern     Parent/sibling w/ CABG, MI or angioplasty before 65F 55M? No   Social History Narrative     about 50 yrs        Wife has some health issues - back pain - second knee replaced    She had gastric bypass and a fib and bad mitral valve        Son in [place]    Daughter in [facility] with 8 yo son.         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[employer]        Born and raised in [place]              Social Determinants of Health     Financial Resource Strain: Not on file   Food Insecurity: Not on file   Transportation Needs: Not on file   Physical Activity: Not on file   Stress: Not on file   Social Connections: Not on file   Intimate Partner Violence: Not on file   Housing Stability: Not on file       REVIEW OF SYSTEMS  =================  C: NEGATIVE for fever, chills, change in weight  I: NEGATIVE for worrisome rashes, moles or lesions  E/M: NEGATIVE for ear, mouth and throat problems  R: NEGATIVE for significant cough or SHORTNESS OF BREATH  CV:  NEGATIVE for chest pain, palpitations or peripheral edema  GI: NEGATIVE for nausea, abdominal pain, heartburn, or change in bowel  habits  NEURO: NEGATIVE numbness/weakness  : see HPI  PSYCH: NEGATIVE depression/anxiety  LYmph: no new enlarged lymph nodes  Ortho: no new trauma/movements    Physical Exam:  Pulse 85   SpO2 99%    Patient is pleasant, in no acute distress, good general condition.  Lung: no evidence of respiratory distress    Abdomen: Soft, nondistended, non tender. No masses. No rebound or guarding.   Exam: bladder scan zero ml.  Skin: Warm and dry.  No redness.  Psych: normal mood and affect  Neuro: alert and oriented  Musculaskeletal: moving all extremities    Assessment/Plan:   (N40.1,  R33.8) Benign prostatic hyperplasia with urinary retention  (primary encounter diagnosis)  Comment: doing well post surgery  Plan: MEASURE POST-VOID RESIDUAL URINE/BLADDER         CAPACITY, US NON-IMAGING ([id number])        rtc prn.

## 2021-11-19 ENCOUNTER — ANTICOAGULATION THERAPY VISIT (OUTPATIENT)
Dept: FAMILY MEDICINE | Facility: CLINIC | Age: 71
End: 2021-11-19
Payer: COMMERCIAL

## 2021-11-19 ENCOUNTER — TRANSFERRED RECORDS (OUTPATIENT)
Dept: HEALTH INFORMATION MANAGEMENT | Facility: CLINIC | Age: 71
End: 2021-11-19

## 2021-11-19 DIAGNOSIS — Z79.01 LONG TERM CURRENT USE OF ANTICOAGULANT THERAPY: Primary | ICD-10-CM

## 2021-11-19 DIAGNOSIS — I48.0 PAROXYSMAL ATRIAL FIBRILLATION (H): ICD-10-CM

## 2021-11-19 LAB — INR POINT OF CARE: 2.4 (ref 0.9–1.1)

## 2021-11-19 PROCEDURE — 36416 COLLJ CAPILLARY BLOOD SPEC: CPT | Performed by: FAMILY MEDICINE

## 2021-11-19 PROCEDURE — 85610 PROTHROMBIN TIME: CPT | Performed by: FAMILY MEDICINE

## 2021-11-19 NOTE — PROGRESS NOTES
ANTICOAGULATION MANAGEMENT     Haresh Rock 71 year old male is on warfarin with therapeutic INR result. (Goal INR 2.0-3.0)    Recent labs: (last 7 days)     11/19/21  1236   INR 2.4*       ASSESSMENT     Source(s): Chart Review and Patient/Caregiver Call       Warfarin doses taken: Warfarin taken as instructed    Diet: No new diet changes identified    New illness, injury, or hospitalization: No    Medication/supplement changes: None noted    Signs or symptoms of bleeding or clotting: No    Previous INR: Therapeutic last 2(+) visits    Additional findings: None     PLAN     Recommended plan for no diet, medication or health factor changes affecting INR     Dosing Instructions: Continue your current warfarin dose with next INR in 1 week       Summary  As of 11/19/2021    Full warfarin instructions:  2.5 mg every Mon, Thu, Sat; 3.75 mg all other days   Next INR check:  11/26/2021             Telephone call with Haresh who verbalizes understanding and agrees to plan and who agrees to plan and repeated back plan correctly    Patient to recheck with home meter    Education provided: Please call back if any changes to your diet, medications or how you've been taking warfarin    Plan made per [facility] anticoagulation protocol    Belinda Lafleur, RN  Anticoagulation Clinic  11/19/2021    _______________________________________________________________________     Anticoagulation Episode Summary     Current INR goal:  2.0-3.0   TTR:  59.9 % (11.7 mo)   Target end date:  Indefinite   Send INR reminders to:  CHELSEA CHILEL    Indications    Long-term (current) use of anticoagulants [Z79.01] [Z79.01]  Atrial fibrillation (H) [I48.91]  Antiphospholipid antibody syndrome (H) (Resolved) [D68.61]  Personal history of DVT (deep vein thrombosis) (Resolved) [Z86.718]  Atrial fibrillation  unspecified type (H) (Resolved) [I48.91]  Paroxysmal atrial fibrillation (H) [I48.0]           Comments:  JESSICA okay to manage by exception          Anticoagulation Care Providers     Provider Role Specialty Phone number    Anya Nowak MD Referring Family Medicine [phone]

## 2021-11-22 ENCOUNTER — MEDICAL CORRESPONDENCE (OUTPATIENT)
Dept: HEALTH INFORMATION MANAGEMENT | Facility: CLINIC | Age: 71
End: 2021-11-22
Payer: COMMERCIAL

## 2021-11-26 ENCOUNTER — TRANSFERRED RECORDS (OUTPATIENT)
Dept: HEALTH INFORMATION MANAGEMENT | Facility: CLINIC | Age: 71
End: 2021-11-26
Payer: COMMERCIAL

## 2021-11-26 ENCOUNTER — VIRTUAL VISIT (OUTPATIENT)
Dept: PHARMACY | Facility: CLINIC | Age: 71
End: 2021-11-26
Payer: COMMERCIAL

## 2021-11-26 ENCOUNTER — ANTICOAGULATION THERAPY VISIT (OUTPATIENT)
Dept: FAMILY MEDICINE | Facility: CLINIC | Age: 71
End: 2021-11-26
Payer: COMMERCIAL

## 2021-11-26 DIAGNOSIS — Z79.01 LONG TERM CURRENT USE OF ANTICOAGULANT THERAPY: Primary | ICD-10-CM

## 2021-11-26 DIAGNOSIS — E78.5 HYPERLIPIDEMIA WITH TARGET LDL LESS THAN 100: ICD-10-CM

## 2021-11-26 DIAGNOSIS — E11.22 TYPE 2 DIABETES MELLITUS WITH STAGE 2 CHRONIC KIDNEY DISEASE, WITHOUT LONG-TERM CURRENT USE OF INSULIN (H): ICD-10-CM

## 2021-11-26 DIAGNOSIS — R33.8 BENIGN PROSTATIC HYPERPLASIA WITH URINARY RETENTION: ICD-10-CM

## 2021-11-26 DIAGNOSIS — I48.91 ATRIAL FIBRILLATION (H): ICD-10-CM

## 2021-11-26 DIAGNOSIS — I48.0 PAROXYSMAL ATRIAL FIBRILLATION (H): ICD-10-CM

## 2021-11-26 DIAGNOSIS — Z78.9 TAKES DIETARY SUPPLEMENTS: ICD-10-CM

## 2021-11-26 DIAGNOSIS — E43 SEVERE PROTEIN-CALORIE MALNUTRITION (H): ICD-10-CM

## 2021-11-26 DIAGNOSIS — N18.2 TYPE 2 DIABETES MELLITUS WITH STAGE 2 CHRONIC KIDNEY DISEASE, WITHOUT LONG-TERM CURRENT USE OF INSULIN (H): ICD-10-CM

## 2021-11-26 DIAGNOSIS — K21.00 GASTROESOPHAGEAL REFLUX DISEASE WITH ESOPHAGITIS WITHOUT HEMORRHAGE: ICD-10-CM

## 2021-11-26 DIAGNOSIS — R13.12 OROPHARYNGEAL DYSPHAGIA: ICD-10-CM

## 2021-11-26 DIAGNOSIS — J31.0 CHRONIC RHINITIS: ICD-10-CM

## 2021-11-26 DIAGNOSIS — G20.A1 PARKINSON DISEASE (H): ICD-10-CM

## 2021-11-26 DIAGNOSIS — N40.1 BENIGN PROSTATIC HYPERPLASIA WITH URINARY RETENTION: ICD-10-CM

## 2021-11-26 DIAGNOSIS — Z93.1 GASTROSTOMY TUBE IN PLACE (H): Primary | ICD-10-CM

## 2021-11-26 DIAGNOSIS — I10 ESSENTIAL HYPERTENSION: ICD-10-CM

## 2021-11-26 LAB — INR (EXTERNAL): 1.9 (ref 0.9–1.1)

## 2021-11-26 PROCEDURE — 99606 MTMS BY PHARM EST 15 MIN: CPT | Performed by: PHARMACIST

## 2021-11-26 NOTE — Clinical Note
JOSEFINA COELLO note, thanks!    Emi Chen, PharmD  Medication Therapy Management Pharmacist  [phone]

## 2021-11-26 NOTE — PATIENT INSTRUCTIONS
Recommendations from today's MTM visit:                                                       1. Ok to change the metoprolol capsules to the syringe every evening, instead of swallowing them. We can touch base in another couple weeks to make sure blood pressure is still looking good.     Follow-up: Return in about 2 weeks (around 12/10/2021) for Medication Therapy Management.    It was great to speak with you today.  I value your experience and would be very thankful for your time with providing feedback on our clinic survey. You may receive a survey via email or text message in the next few days.     To schedule another MTM appointment, please call the clinic directly or you may call the MTM scheduling line at [phone] or toll-free at [phone].     My Clinical Pharmacist's contact information:                                                      Please feel free to contact me with any questions or concerns you have.      Emi Chen, PharmD  Medication Therapy Management Pharmacist  [phone]

## 2021-11-26 NOTE — PROGRESS NOTES
ANTICOAGULATION MANAGEMENT     Haresh Rock 71 year old male is on warfarin with therapeutic INR result. (Goal INR 2.0-3.0)    Recent labs: (last 7 days)     11/26/21  1156   INR 1.9*       ASSESSMENT     Source(s): Chart Review and Patient/Caregiver Call       Warfarin doses taken: Warfarin taken as instructed    Diet: Up to six bottles a day except for yesterday he only had 4.    New illness, injury, or hospitalization: No    Medication/supplement changes: None noted    Signs or symptoms of bleeding or clotting: No    Previous INR: Therapeutic last 2(+) visits    Additional findings: None     PLAN     Recommended plan for no diet, medication or health factor changes affecting INR     Dosing Instructions: Continue your current warfarin dose with next INR in 1 week       Summary  As of 11/26/2021    Full warfarin instructions:  2.5 mg every Mon, Thu, Sat; 3.75 mg all other days   Next INR check:  12/3/2021             Telephone call with Haresh who verbalizes understanding and agrees to plan    Patient to recheck with home meter    Education provided: Goal range and significance of current result    Plan made per [facility] anticoagulation protocol    Laverne Ferguson RN  Anticoagulation Clinic  11/26/2021    _______________________________________________________________________     Anticoagulation Episode Summary     Current INR goal:  2.0-3.0   TTR:  61.5 % (11.7 mo)   Target end date:  Indefinite   Send INR reminders to:  CHELSEA CHILEL    Indications    Long-term (current) use of anticoagulants [Z79.01] [Z79.01]  Atrial fibrillation (H) [I48.91]  Antiphospholipid antibody syndrome (H) (Resolved) [D68.61]  Personal history of DVT (deep vein thrombosis) (Resolved) [Z86.718]  Atrial fibrillation  unspecified type (H) (Resolved) [I48.91]  Paroxysmal atrial fibrillation (H) [I48.0]           Comments:  JESSICA rodas to manage by exception         Anticoagulation Care Providers     Provider Role Specialty Phone number     Anya Nowak MD [facility] [phone]

## 2021-11-26 NOTE — PROGRESS NOTES
Medication Therapy Management (MTM) Encounter    ASSESSMENT:                            Medication Adherence/Access: No issues identified    G-tube/Small bowel obstruction, gastrostomy tube in place/oropharyngeal dysphagia/recurrent aspiration: Reviewed meds with patient, ok to keep taking several that he is by mouth or crushing    BPH:  No current therapy needed.    Parkinson's disease: plan in place     Afib/Hypertension: may need monitoring of blood pressure after changing metoprolol formulations/administration - he plans to start opening capsules and administering with syringe via g-tube.  Can also consider changing to metoprolol tartrate 12.5 mg twice daily (by mouth or crush), if ER capsule not covered or patient preference.  Administration of Flecanide with dairy can decrease absorption.      Flecanaide can have reduced absorption when taken with milk. If your feedings are milk based, you would want to take this 30 min to 1 hour before tube feedings.  Generally, do mix meds with tube feedings    Type 2 Diabetes: Stable. A1C at goal <7%.     Hyperlipidemia: Stable.  Patient is on high intensity statin which is indicated based on 2019 ACC/AHA guidelines for lipid management.      GERD: Stable. PPI not necessary.    Bowels:  Plan in place.     Allergic Rhinitis: Stable.     Pain: Stable.     Supplements: Stable.     PLAN:                            1. Ok to change the metoprolol capsules to the syringe every evening, instead of swallowing them. We can touch base in another couple weeks to make sure blood pressure is still looking good.     Follow-up: Return in about 2 weeks (around 12/10/2021) for Medication Therapy Management.    SUBJECTIVE/OBJECTIVE:                          Haresh Rock is a 71 year old male called for a follow-up visit. He was referred to me from Anya Nowak for G tube management - need alternatives to metoprolol XL 25 mg and avodart 0.5 mg, see phone enconter dated 11/8/21.   Today's visit is a follow-up MT visit from 11/12/21.     Reason for visit: follow-up on blood pressure.    Allergies/ADRs: Reviewed in chart  Past Medical History: Reviewed in chart  Tobacco: He reports that he has never smoked. He has never used smokeless tobacco.  Alcohol: none  Caffeine: mountain dew code red    Medication Adherence/Access: mountain dew code red, mixes with thick-it so he can swallow it better. He takes his medications with this.    Crushing pills is a bit of a hassle. He'd prefer to just swallow them, which he is able to for most of them.   Morning pills 9:30 am, moving towards getting up earlier and starting first feeding at 8 am.     G-tube/Small bowel obstruction, gastrostomy tube in place/oropharyngeal dysphagia/recurrent aspiration:   Biotine spray swish and spit 1-2 mLs QID PRN - he doesn't prefer this, worried about aspirating this.     Difficulty swallowing due to parkinson's disease. Hospitalized in August with pneumonia due to aspiration, afterward the G-tube was recommended. It was a possibility that had been discussed for ~ 1 year, but has only been placed in the month (10/25/21). Can still swallow, just difficult especially with food. Planning to have a treat by mouth now and then to enjoy something.  During placement of the G-tube, the small intestine was punctured, he had emergency surgery to correct this, never developed any infection, he's been doing well since then.  He does feedings himself, 5 cartons/day.    BPH:    Dr. Hawley stopped this Avodart, no longer needed.     Parkinson's disease:   Not currently on anything, treatment for this has been up in the air. Dr. Nowak is checking with neurologist to see if he should start Sinemet.     Afib/Hypertension:   Flecainide 50 mg twice daily (crushes in evening and and takes by mouth in the morning)  Metoprolol ER 25 mg every evening - has been swallowing it (see below)  Warfarin per [facility] - currently being adjusted  due to formula tube feedings. He checks INR at home.      Plans to start administering capsules as follows:  open capsule and add contents to an all plastic oral tip syringe; add 15 mL of water. Gently shake the syringe for ~10 seconds. Immediately deliver mixture through tube. No granules should remain in the syringe; rinse syringe with additional water if necessary.  Patient does not have a history of GI bleed.   Patient reports no current medication side effects.  He does monitor blood pressure at home: 110-115/68-70 mmHg. Home health nurse got similar reading when she checked this week.   INR   Date Value Ref Range Status   07/16/2021 1.9 (A) 2 - 3 Final     INR Protime   Date Value Ref Range Status   11/19/2021 2.4 (A) 0.9 - 1.1 Final     INR (External)   Date Value Ref Range Status   11/26/2021 1.9 (A) 0.9 - 1.1 Final     BP Readings from Last 3 Encounters:   11/10/21 114/66   11/04/21 135/71   10/27/21 (!) 157/83     Type 2 Diabetes:   No current medications.  Urine Albumin:   Lab Results   Component Value Date    UMALCR 62.45 (H) 07/27/2021     Lab Results   Component Value Date    A1C 5.5 08/20/2021    A1C 5.4 05/25/2021    A1C 5.5 01/15/2021    A1C 6.3 07/20/2020    A1C 6.9 04/23/2019    A1C 6.4 01/25/2018     Hyperlipidemia:  rosuvastatin 40 mg daily in evening, crushing    Patient reports no significant myalgias or other side effects.  Recent Labs   Lab Test 08/20/21  0752 01/15/21  0817 04/18/16  1659 11/16/15  1155 07/09/14  1511   CHOL 102 110   < > 126 168   HDL 40 36*   < > 31* 29*   LDL 50 55   < > 71 104   TRIG 61 96   < > 119 174*   CHOLHDLRATIO  --   --   --  4.1 5.8*    < > = values in this interval not displayed.     GERD:   Tums 500 mg chewables twice daily PRN (only needed once this week so far)    Omeprazole 20 mg daily - new he hasn't started this yet, prescribed for hematemesis right after his stomach tube was placed. Previously was on pantoprazole, he stopped it last summer because he  didn't feel he needed this. No longer having hematemesis, and he'll plan to use if needed for heartburn. No nausea - hasn't needed the Zofran ODT.      Bowels:    Senna-S 2 tablets daily PRN, by mouth (was on prior to g-tube placement).     Bowels haven't been back to normal completely, but he's not having true constipation. Just not as many bowel movements.    Allergic Rhinitis:   fluticasone nasal spray 1 spray(s) once daily PRN.   Loratadine 10 mg daily PRN (takes most days) - he has the dissolvable kind.     Patient feels that current therapy is effective.   He'll be seeing a pulmonologist in December.     Pain:   Acetaminophen 1000 mg daily PRN - by mouth or crush.     States this/these are effective, pain is minimal from incision where g tube was placed, surgery. Denies side effects.     Supplements:   Multivitamin daily - gummy   Vitamin D 50 mcg daily, capsule, by mouth    Chewing the multivitamin caused more aspiration due to the chalky nature of them, so he switched to the chewable gummies. (his Tums are not chalky).  Vitamin D Deficiency Screening Results:  Lab Results   Component Value Date    VITDT 19 (L) 06/08/2021    VITDT 18 (L) 02/15/2021      No reported issues at this time.     Today's Vitals: There were no vitals taken for this visit.  ----------------      I spent 14 minutes with this patient today. All changes were made via collaborative practice agreement with Anya Nowak MD. A copy of the visit note was provided to the patient's primary care provider.    The patient was mailed a summary of these recommendations.     Emi Chen, PharmD  Medication Therapy Management Pharmacist  [phone]    Telemedicine Visit Details  Type of service:  Telephone visit  Start Time: 10:40 AM  End Time: 10:54 AM  Originating Location (patient location): Home  Distant Location (provider location):  [facility]     Medication Therapy Recommendations  No medication therapy  recommendations to display

## 2021-11-29 ENCOUNTER — MYC MEDICAL ADVICE (OUTPATIENT)
Dept: FAMILY MEDICINE | Facility: CLINIC | Age: 71
End: 2021-11-29
Payer: COMMERCIAL

## 2021-11-29 ENCOUNTER — MYC MEDICAL ADVICE (OUTPATIENT)
Dept: NEUROLOGY | Facility: CLINIC | Age: 71
End: 2021-11-29
Payer: COMMERCIAL

## 2021-11-29 DIAGNOSIS — G20.A1 PARKINSON DISEASE (H): Primary | ICD-10-CM

## 2021-11-29 DIAGNOSIS — K92.0 HEMATEMESIS WITH NAUSEA: ICD-10-CM

## 2021-11-30 RX ORDER — CARBIDOPA AND LEVODOPA 25; 100 MG/1; MG/1
TABLET ORAL
Qty: 90 TABLET | Refills: 11 | Status: SHIPPED | OUTPATIENT
Start: 2021-11-30 | End: 2021-12-27

## 2021-12-01 ENCOUNTER — TELEPHONE (OUTPATIENT)
Dept: FAMILY MEDICINE | Facility: CLINIC | Age: 71
End: 2021-12-01

## 2021-12-01 ENCOUNTER — OFFICE VISIT (OUTPATIENT)
Dept: PULMONOLOGY | Facility: CLINIC | Age: 71
End: 2021-12-01
Attending: INTERNAL MEDICINE
Payer: COMMERCIAL

## 2021-12-01 ENCOUNTER — PATIENT OUTREACH (OUTPATIENT)
Dept: CARE COORDINATION | Facility: CLINIC | Age: 71
End: 2021-12-01

## 2021-12-01 VITALS
RESPIRATION RATE: 16 BRPM | BODY MASS INDEX: 19.67 KG/M2 | HEART RATE: 80 BPM | OXYGEN SATURATION: 97 % | DIASTOLIC BLOOD PRESSURE: 73 MMHG | SYSTOLIC BLOOD PRESSURE: 116 MMHG | WEIGHT: 140.5 LBS | HEIGHT: 71 IN | TEMPERATURE: 97.4 F

## 2021-12-01 DIAGNOSIS — Z93.1 GASTROSTOMY TUBE IN PLACE (H): Primary | ICD-10-CM

## 2021-12-01 DIAGNOSIS — R79.82 ELEVATED C-REACTIVE PROTEIN (CRP): ICD-10-CM

## 2021-12-01 DIAGNOSIS — R70.0 ELEVATED ERYTHROCYTE SEDIMENTATION RATE: ICD-10-CM

## 2021-12-01 DIAGNOSIS — R13.12 OROPHARYNGEAL DYSPHAGIA: ICD-10-CM

## 2021-12-01 DIAGNOSIS — R91.8 GROUND GLASS OPACITY PRESENT ON IMAGING OF LUNG: ICD-10-CM

## 2021-12-01 PROCEDURE — 99204 OFFICE O/P NEW MOD 45 MIN: CPT | Performed by: INTERNAL MEDICINE

## 2021-12-01 PROCEDURE — G0463 HOSPITAL OUTPT CLINIC VISIT: HCPCS

## 2021-12-01 ASSESSMENT — PAIN SCALES - GENERAL: PAINLEVEL: NO PAIN (0)

## 2021-12-01 ASSESSMENT — MIFFLIN-ST. JEOR: SCORE: 1404.74

## 2021-12-01 NOTE — NURSING NOTE
"Oncology Rooming Note    December 1, 2021 8:46 AM   Haresh Rock is a 71 year old male who presents for:    Chief Complaint   Patient presents with     Oncology Clinic Visit     GROUND GLASS OPACITY / ELEVATED ESR AND CRP      Initial Vitals: /73   Pulse 80   Temp 97.4  F (36.3  C) (Oral)   Resp 16   Ht 1.804 m (5' 11.02\")   Wt 63.7 kg (140 lb 8 oz)   SpO2 97%   BMI 19.58 kg/m   Estimated body mass index is 19.58 kg/m  as calculated from the following:    Height as of this encounter: 1.804 m (5' 11.02\").    Weight as of this encounter: 63.7 kg (140 lb 8 oz). Body surface area is 1.79 meters squared.  No Pain (0) Comment: Data Unavailable   No LMP for male patient.  Allergies reviewed: Yes  Medications reviewed: Yes    Medications: Medication refills not needed today.  Pharmacy name entered into [facility]:    [id number] - [place], [place] - [id number] - [facility], [place] - [address] AT [address] [place] [address]    Clinical concerns: New pt      Cindy Wright, CHLOÉ December 1, 2021  8:46 AM             "

## 2021-12-01 NOTE — TELEPHONE ENCOUNTER
----- Message from Vahid Edward MD sent at 12/1/2021  9:14 AM CST -----  Good morning,I saw Haresh this morning for his abnormal CT scan and elevated ESR/CRP.  I am in touch with hematology as well as the infectious disease specialist to figure out if he needs bronchoscopy or not.  In my opinion, physically he may not be able to an easy case to tolerate conscious sedation considering his comorbidities.As you know, he has a PEG tube placed for his ongoing aspiration now but he is still taking oral food and medications and he is not clear exactly what he should do in terms of his oral diet or if he could take his pills orally.  He described me today that he is choking/coughing whenever he eats or drinks.  I thought I relayed this message to you as I do not know him very well but I told that he should have a plan may be fighter speech pathology going forward in regard to what he can eat or cannot.Thanks    Juanjose Edward

## 2021-12-01 NOTE — TELEPHONE ENCOUNTER
Please call patient: I recommend that you avoid taking food, beverages, and pills by mouth as best you can, and follow tube feeding recommendations by the dietician. You can follow-up with the speech therapist for additional options/recommedations.   Anya Nowak MD

## 2021-12-01 NOTE — PROGRESS NOTES
Clinic Care Coordination Contact  [facility]/Voicemail       Clinical Data: Care Coordinator Outreach  Outreach attempted x 1.  Left message on patient's voicemail with call back information and requested return call.  Plan: Care Coordinator sent care coordination introduction letter on 11/5/21 via [employer]. Care Coordinator will try to reach patient again in 3-5 business days.          Uriel Zhou MSN, RN, PHN, CCM   Primary Care Clinical RN Care Coordinator  [facility]  12/1/2021   10:34 AM  [email or web address]  Office: [phone]

## 2021-12-01 NOTE — PROGRESS NOTES
[facility]  Interventional Pulmonary Clinic Visit Note    December 1, 2021    Chief complaint:  Haresh Rock is a 71 year old male seen for   Chief Complaint   Patient presents with     Oncology Clinic Visit     GROUND GLASS OPACITY / ELEVATED ESR AND CRP        Reason for clinic visit / Chief complaint:   Abnormal CT chest    Assessment and Plan:  #1  Bibasilar groundglass opacifications on recent CT scan which I reviewed likely representing inflammation due to ongoing aspiration.  Sent to my clinic for possible bronchoscopy and bronchoalveolar lavage to rule out atypical infections.  I sent message to his hematology and infectious disease specialists to have a joint decision on this.  In my opinion, his inflammatory changes in the lung are unlikely to be because of high ESR and CRP.  Also, return from lower lobe lavages typically not very well therefore low diagnostic yield in most cases.    #2 ongoing aspiration based on swallow study which I reviewed last from September 2021.  Patient recently hospitalized and had a PEG tube placement.  Still has his pills p.o. as well as eats or drinks at dinnertime small amount but not clear what he should eat or not.  I communicated with his primary care provider to streamline his oral intake going forward    #3 history of Hodgkin's disease s/p bone marrow transplantation and followed by hematology he has an upcoming appointment next week.    #4 dysphagia likely due to Parkinson's disease has been on medications.      Billing: Based on Medical Decision Making (Complexity):  L4      History of Present Illness:  This is a 71 years old gentleman with multiple medical problems referred to my clinic for further evaluation of abnormal CT scan as indicated above.  I reviewed his CT scans (three of them) from two thousand twenty-one, his swallow study as well as lab work.  He was recently seen by infectious disease for elevated ESR and elevated CRP.  He recently had a PEG tube  placement for his ongoing dysphagia and aspiration that was seen on multiple swallow studies in the past, last one in September 2021.  He has no known pulmonary problems otherwise.       Allergies   Allergen Reactions     Seasonal Allergies         Past Medical History:   Diagnosis Date     Abnormal dopamine scan (DaTSCAN) 2020 11/04/2020    [phone] 11/3/2020   Narrative & Impression   Examination: Nuclear medicine DATscan for Dopamine Receptor Localization.   Examination: NM Brain Image Tomographic (SPECT) DATscan   Date: 11/3/2020 3:21 PM   Indication: Parkinsonism   Comparison: None   Additional Information: none   Interfering Medications: None   Technique:   The patient initially received 1 ml of Lugol's solution orally prior     Antiphospholipid antibody syndrome (H)     Ravi's, noted negative per testing re-done in 2008 in hematology note 01/13/2009     Articulation disorder 09/23/2020     Atrial fibrillation (H) 04/2011     Benign prostatic hyperplasia with urinary retention      Cirrhosis (H)      CKD (chronic kidney disease) stage 2, GFR 60-89 ml/min      Diabetes mellitus type II     steroid induced     DJD (degenerative joint disease) of knee     SHAWN, worse on right     DVT (deep venous thrombosis) (H)      ED (erectile dysfunction)      Gallbladder polyp 05/2010     Zaakb-Edcpkx-Yjga Disease 2007    BMT     Hemochromatosis      Hodgkin's disease NOS     5 cycles of ABVD in 2011     HTN (hypertension)      Hyperlipidaemia LDL goal <100      Multiple thyroid nodules     benign      Myeloproliferative disorder (H)     atypical, [facility]     Parkinson disease (H) 09/24/2020     PE (pulmonary embolism) 11/2004     Polyneuropathy      Pressure ulcer      Primary pulmonary hypertension (H)      Severe protein-calorie malnutrition (H) 11/10/2021     Small bowel obstruction (H) 10/29/2021     Thrombocytopenia (H) 06/08/2021        Past Surgical History:   Procedure Laterality Date     ANESTHESIA CARDIOVERSION   04/21/2011    Procedure:ANESTHESIA CARDIOVERSION; Surgeon:GENERIC ANESTHESIA PROVIDER; Location:[facility] OR     ARTHROSCOPY KNEE RT/LT      LT     CATARACT IOL, RT/LT  2017     COLONOSCOPY  10/16/2012    Procedure: COLONOSCOPY;  Colonoscopy, screening;  Surgeon: Reg Feliciano MD;  Location: [facility] OR     COLONOSCOPY N/A 04/14/2021    Procedure: COLONOSCOPY;  Surgeon: Reg Holm MD;  Location: [facility] GI     ESOPHAGOSCOPY, GASTROSCOPY, DUODENOSCOPY (EGD), COMBINED N/A 10/07/2020    Procedure: ESOPHAGOGASTRODUODENOSCOPY, WITH BIOPSY;  Surgeon: Raul Sawyer DO;  Location: [facility] OR     H ABLATION FOCAL AFIB  03/05/2013    PVI     H ABLATION FOCAL AFIB  01/01/2018     HC BONE MARROW/STEM TRANSPLANT ALLOGENIC  05/08/2007     INSERT PORT VASCULAR ACCESS       IR GASTROSTOMY TUBE PERCUTANEOUS PLCMNT  10/25/2021     JOINT REPLACEMTN, KNEE RT/LT  03/28/2012    SHAWN     LAPAROSCOPY DIAGNOSTIC (GENERAL) N/A 10/29/2021    Procedure: LAPAROSCOPY, DIAGNOSTIC, TAKEDOWN OF MALPOSITIONED GASTROSTOMY TUBE, INSERTION OF GASTROSTOMY TUBE, SMALL BOWEL RESECTION X1, PRIMARY REPAIR OF SMALL BOWEL ENTEROTOMY, ABDOMINAL WASHOUT, TAP BLOCK;  Surgeon: Leif Finney DO;  Location: [facility] OR     PEG TUBE PLACEMENT  10/25/2021     VASECTOMY  1990        Social History     Socioeconomic History     Marital status:      Spouse name: Angelica     Number of children: 2     Years of education: 21     Highest education level: Not on file   Occupational History     Occupation:      Employer: [employer]     Employer: DISABLED   Tobacco Use     Smoking status: Never Smoker     Smokeless tobacco: Never Used   Substance and Sexual Activity     Alcohol use: No     Drug use: No     Sexual activity: Not Currently     Partners: Female   Other Topics Concern     Parent/sibling w/ CABG, MI or angioplasty before 65F 55M? No   Social History Narrative     about 50 yrs        Wife has some health issues - back pain - second  knee replaced    She had gastric bypass and a fib and bad mitral valve        Son in [place]    Daughter in [facility] with 8 yo son.         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[employer]        Born and raised in [place]              Social Determinants of Health     Financial Resource Strain: Not on file   Food Insecurity: Not on file   Transportation Needs: Not on file   Physical Activity: Not on file   Stress: Not on file   Social Connections: Not on file   Intimate Partner Violence: Not on file   Housing Stability: Not on file        Family History   Problem Relation Age of Onset     Hypertension Mother      Cerebrovascular Disease Mother      Breast Cancer Mother      Arrhythmia Brother      Arrhythmia Sister         supraventricular tachycardia     Thyroid Disease Sister      Other - See Comments Son         lives in [place]. no kids and not .     Obsessive Compulsive Disorder Son      Depression Son      Eating Disorder Son         eats when depressed     Obesity Son      Thyroid Cancer Daughter         had thyroid removed     Bipolar Disorder Daughter      Other - See Comments Daughter         lives in [place] - single with one son 9  yrs     Other - See Comments Sister      Alzheimer Disease Father      Diabetes Paternal Aunt      Gastrointestinal Disease Maternal Grandmother         colitis      Parkinsonism Other         2 cousins with parkinson     C.A.D. No family hx of         Immunization History   Administered Date(s) Administered     COVID-19,PF,Moderna 03/01/2021, 03/29/2021, 10/20/2021     FLU 6-35 months 10/24/2008, 09/25/2009, 09/11/2012     HepB 01/29/2010, 03/01/2010, 09/11/2012     HepB-Adult 01/29/2010, 03/01/2010, 09/11/2012     Hib (PRP-T) 09/11/2012     Influenza (H1N1) 11/11/2009     Influenza (High Dose) 3 valent vaccine 10/09/2014, 10/28/2015, 09/19/2016, 10/30/2017, 09/12/2018, 09/13/2019     Influenza (IIV3) PF 12/22/2004, 09/25/2009,  11/11/2009, 11/16/2010, 12/06/2011, 09/11/2012     Influenza Vaccine IM > 6 months Valent IIV4 (Alfuria,Fluzone) 10/15/2013     Influenza, Quad, High Dose, Pf, 65yr+ (Fluzone HD) 09/24/2020, 11/10/2021     MMR 09/19/2016     Pneumo Conj 13-V (2010&after) 09/11/2012     Pneumococcal 23 valent 06/02/2005, 11/28/2011, 05/09/2013     Poliovirus, inactivated (IPV) 09/11/2012, 09/19/2016     TD (ADULT, 7+) 06/02/2005     TDAP Vaccine (Adacel) 09/11/2012     Zoster vaccine, live 09/19/2016       Current Outpatient Medications   Medication Sig     acetaminophen (TYLENOL) 500 MG tablet Take 1,000 mg by mouth 2 times daily as needed      artificial saliva (BIOTENE MT) SOLN solution Swish and spit 1-2 mLs (1-2 sprays) in mouth 4 times daily     calcium carbonate (TUMS) 500 MG chewable tablet Take 1 chew tab by mouth 2 times daily as needed for heartburn      flecainide (TAMBOCOR) 50 MG tablet TAKE 1 TABLET BY MOUTH TWICE A DAY     fluticasone (FLONASE) 50 MCG/ACT nasal spray Spray 1 spray into both nostrils daily as needed for rhinitis     loratadine (CLARITIN REDITABS) 10 MG ODT Take 10 mg by mouth daily as needed for allergies     Metoprolol Succinate (KAPSPARGO) 25 MG CS24 1 capsule by Tube route daily . Open capsule and add contents to an all plastic oral tip syringe; add 15 mL of water. Gently shake the syringe for ~10 seconds. Immediately deliver mixture through tube. No granules should remain in the syringe; rinse syringe with additional water if necessary.     Multiple Vitamins-Minerals (MULTIVITAMIN GUMMIES ADULT PO) Take 1 each by mouth daily     omeprazole (PRILOSEC) 20 MG DR capsule Take 1 capsule (20 mg) by mouth every morning (Patient taking differently: Take 20 mg by mouth daily as needed )     ondansetron (ZOFRAN-ODT) 4 MG ODT tab Take 1 tablet (4 mg) by mouth every 6 hours as needed for nausea     PREVIDENT 5000 DRY MOUTH 1.1 % GEL topical gel Apply to affected area daily      rosuvastatin (CRESTOR) 40 MG  "tablet Take 1 tablet (40 mg) by mouth At Bedtime     senna-docusate (SENOKOT-S/PERICOLACE) 8.6-50 MG tablet Take 2 tablets by mouth daily as needed for constipation     Starch-Maltodextrin (THICK-IT PO) Mixes with mountain dew code red to take pills with.     vitamin D3 (CHOLECALCIFEROL) 50 mcg (2000 units) tablet Take 1 tablet (50 mcg) by mouth daily     warfarin ANTICOAGULANT (COUMADIN) 2.5 MG tablet Hold warfarin today, restart after INR check tomorrow     carbidopa-levodopa (SINEMET)  MG tablet Start with 1/2 tab daily x 3days then 1/2 tab twice daily for 3-7 days then 1 tab twice daily for one week then 1 tablet 3 times per day. (Patient not taking: Reported on 12/1/2021)     Current Facility-Administered Medications   Medication     ondansetron (ZOFRAN) injection 4 mg        Review of Systems:  I have done 10 points of review systems and all negative except for those mentioned in HPI    Physical examination  Constitutional: Oriented, not in distress  /73   Pulse 80   Temp 97.4  F (36.3  C) (Oral)   Resp 16   Ht 1.804 m (5' 11.02\")   Wt 63.7 kg (140 lb 8 oz)   SpO2 97%   BMI 19.58 kg/m    Eyes: No icterus, nystagmus, pupils isocoric  Head and neck: normal posture and movements  Respiratory: Normal tidal breathing, no shortness of breath, no audible wheezing or stridor, clear to auscultation   Musculoskeletal: Normal muscle mass, no deformity on hands/fingers  Integumentary:  No rash on visible skin areas on video visit  Neurological: Alert, orientedx3  Psychiatric:  Mood and affect are appropriate with insight into his medical condition    Data:  Lab Results   Component Value Date    WBC 10.7 11/01/2021    WBC 8.8 07/05/2021     Lab Results   Component Value Date    RBC 4.05 11/01/2021    RBC 4.30 07/05/2021     Lab Results   Component Value Date    HGB 13.0 11/01/2021    HGB 13.8 07/05/2021     Lab Results   Component Value Date    HCT 38.1 11/01/2021    HCT 38.9 07/05/2021     Lab Results "   Component Value Date    MCV 94 11/01/2021    MCV 91 07/05/2021     Lab Results   Component Value Date    MCH 32.1 11/01/2021    MCH 32.1 07/05/2021     Lab Results   Component Value Date    MCHC 34.1 11/01/2021    MCHC 35.5 07/05/2021     Lab Results   Component Value Date    RDW 13.2 11/01/2021    RDW 13.0 07/05/2021     Lab Results   Component Value Date    PLT 99 11/01/2021     07/05/2021       Lab Results   Component Value Date     11/04/2021     07/05/2021      Lab Results   Component Value Date    POTASSIUM 4.5 11/04/2021    POTASSIUM 3.3 07/05/2021     Lab Results   Component Value Date    CHLORIDE 106 11/04/2021    CHLORIDE 103 07/05/2021     Lab Results   Component Value Date    GILBERT 7.8 11/04/2021    GILBERT 8.3 07/05/2021     Lab Results   Component Value Date    CO2 30 11/04/2021    CO2 28 07/05/2021     Lab Results   Component Value Date    BUN 31 11/04/2021    BUN 25 07/05/2021     Lab Results   Component Value Date    CR 0.97 11/04/2021    CR 1.15 07/05/2021     Lab Results   Component Value Date     11/04/2021    GLC 97 07/05/2021         ELISA Edward MD

## 2021-12-01 NOTE — LETTER
12/1/2021     RE: Haresh Rock  [address] [place] [address]     Dear Colleague,    Thank you for referring your patient, Haresh Rock, to the [facility] at [facility]. Please see a copy of my visit note below.  [facility]  Interventional Pulmonary Clinic Visit Note  December 1, 2021    Chief complaint:  Haresh Rock is a 71 year old male seen for   Chief Complaint   Patient presents with     Oncology Clinic Visit     GROUND GLASS OPACITY / ELEVATED ESR AND CRP      Reason for clinic visit / Chief complaint:   Abnormal CT chest    Assessment and Plan:  #1  Bibasilar groundglass opacifications on recent CT scan which I reviewed likely representing inflammation due to ongoing aspiration.  Sent to my clinic for possible bronchoscopy and bronchoalveolar lavage to rule out atypical infections.  I sent message to his hematology and infectious disease specialists to have a joint decision on this.  In my opinion, his inflammatory changes in the lung are unlikely to be because of high ESR and CRP.  Also, return from lower lobe lavages typically not very well therefore low diagnostic yield in most cases.    #2 ongoing aspiration based on swallow study which I reviewed last from September 2021.  Patient recently hospitalized and had a PEG tube placement.  Still has his pills p.o. as well as eats or drinks at dinnertime small amount but not clear what he should eat or not.  I communicated with his primary care provider to streamline his oral intake going forward    #3 history of Hodgkin's disease s/p bone marrow transplantation and followed by hematology he has an upcoming appointment next week.    #4 dysphagia likely due to Parkinson's disease has been on medications.    Billing: Based on Medical Decision Making (Complexity):  L4      History of Present Illness:  This is a 71 years old gentleman with multiple medical problems referred to my  clinic for further evaluation of abnormal CT scan as indicated above.  I reviewed his CT scans (three of them) from two thousand twenty-one, his swallow study as well as lab work.  He was recently seen by infectious disease for elevated ESR and elevated CRP.  He recently had a PEG tube placement for his ongoing dysphagia and aspiration that was seen on multiple swallow studies in the past, last one in September 2021.  He has no known pulmonary problems otherwise.    Allergies   Allergen Reactions     Seasonal Allergies       Past Medical History:   Diagnosis Date     Abnormal dopamine scan (DaTSCAN) 2020 11/04/2020    [phone] 11/3/2020   Narrative & Impression   Examination: Nuclear medicine DATscan for Dopamine Receptor Localization.   Examination: NM Brain Image Tomographic (SPECT) DATscan   Date: 11/3/2020 3:21 PM   Indication: Parkinsonism   Comparison: None   Additional Information: none   Interfering Medications: None   Technique:   The patient initially received 1 ml of Lugol's solution orally prior     Antiphospholipid antibody syndrome (H)     Ravi's, noted negative per testing re-done in 2008 in hematology note 01/13/2009     Articulation disorder 09/23/2020     Atrial fibrillation (H) 04/2011     Benign prostatic hyperplasia with urinary retention      Cirrhosis (H)      CKD (chronic kidney disease) stage 2, GFR 60-89 ml/min      Diabetes mellitus type II     steroid induced     DJD (degenerative joint disease) of knee     SHAWN, worse on right     DVT (deep venous thrombosis) (H)      ED (erectile dysfunction)      Gallbladder polyp 05/2010     Xzypf-Ytaicx-Syaw Disease 2007    BMT     Hemochromatosis      Hodgkin's disease NOS     5 cycles of ABVD in 2011     HTN (hypertension)      Hyperlipidaemia LDL goal <100      Multiple thyroid nodules     benign      Myeloproliferative disorder (H)     atypical, [facility]     Parkinson disease (H) 09/24/2020     PE (pulmonary embolism) 11/2004     Polyneuropathy       Pressure ulcer      Primary pulmonary hypertension (H)      Severe protein-calorie malnutrition (H) 11/10/2021     Small bowel obstruction (H) 10/29/2021     Thrombocytopenia (H) 06/08/2021      Past Surgical History:   Procedure Laterality Date     ANESTHESIA CARDIOVERSION  04/21/2011    Procedure:ANESTHESIA CARDIOVERSION; Surgeon:GENERIC ANESTHESIA PROVIDER; Location:[facility] OR     ARTHROSCOPY KNEE RT/LT      LT     CATARACT IOL, RT/LT  2017     COLONOSCOPY  10/16/2012    Procedure: COLONOSCOPY;  Colonoscopy, screening;  Surgeon: Reg Feliciano MD;  Location: [facility] OR     COLONOSCOPY N/A 04/14/2021    Procedure: COLONOSCOPY;  Surgeon: Reg Holm MD;  Location: [facility] GI     ESOPHAGOSCOPY, GASTROSCOPY, DUODENOSCOPY (EGD), COMBINED N/A 10/07/2020    Procedure: ESOPHAGOGASTRODUODENOSCOPY, WITH BIOPSY;  Surgeon: Raul Sawyer DO;  Location: [facility] OR     H ABLATION FOCAL AFIB  03/05/2013    PVI     H ABLATION FOCAL AFIB  01/01/2018     HC BONE MARROW/STEM TRANSPLANT ALLOGENIC  05/08/2007     INSERT PORT VASCULAR ACCESS       IR GASTROSTOMY TUBE PERCUTANEOUS PLCMNT  10/25/2021     JOINT REPLACEMTN, KNEE RT/LT  03/28/2012    SHAWN     LAPAROSCOPY DIAGNOSTIC (GENERAL) N/A 10/29/2021    Procedure: LAPAROSCOPY, DIAGNOSTIC, TAKEDOWN OF MALPOSITIONED GASTROSTOMY TUBE, INSERTION OF GASTROSTOMY TUBE, SMALL BOWEL RESECTION X1, PRIMARY REPAIR OF SMALL BOWEL ENTEROTOMY, ABDOMINAL WASHOUT, TAP BLOCK;  Surgeon: Leif Finney DO;  Location: [facility] OR     PEG TUBE PLACEMENT  10/25/2021     VASECTOMY  1990      Social History     Socioeconomic History     Marital status:      Spouse name: Angelica     Number of children: 2     Years of education: 21     Highest education level: Not on file   Occupational History     Occupation:      Employer: [employer]     Employer: [employer]   Tobacco Use     Smoking status: Never Smoker     Smokeless tobacco: Never Used   Substance and Sexual Activity     Alcohol  use: No     Drug use: No     Sexual activity: Not Currently     Partners: Female   Other Topics Concern     Parent/sibling w/ CABG, MI or angioplasty before 65F 55M? No   Social History Narrative     about 50 yrs        Wife has some health issues - back pain - second knee replaced    She had gastric bypass and a fib and bad mitral valve        Son in [place]    Daughter in [facility] with 10 yo son.         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        Born and raised in [place]              Social Determinants of Health     Financial Resource Strain: Not on file   Food Insecurity: Not on file   Transportation Needs: Not on file   Physical Activity: Not on file   Stress: Not on file   Social Connections: Not on file   Intimate Partner Violence: Not on file   Housing Stability: Not on file     Family History   Problem Relation Age of Onset     Hypertension Mother      Cerebrovascular Disease Mother      Breast Cancer Mother      Arrhythmia Brother      Arrhythmia Sister         supraventricular tachycardia     Thyroid Disease Sister      Other - See Comments Son         lives in [place]. no kids and not .     Obsessive Compulsive Disorder Son      Depression Son      Eating Disorder Son         eats when depressed     Obesity Son      Thyroid Cancer Daughter         had thyroid removed     Bipolar Disorder Daughter      Other - See Comments Daughter         lives in [place] - single with one son 9  yrs     Other - See Comments Sister      Alzheimer Disease Father      Diabetes Paternal Aunt      Gastrointestinal Disease Maternal Grandmother         colitis      Parkinsonism Other         2 cousins with parkinson     C.A.D. No family hx of         Immunization History   Administered Date(s) Administered     COVID-19,PF,Moderna 03/01/2021, 03/29/2021, 10/20/2021     FLU 6-35 months 10/24/2008, 09/25/2009, 09/11/2012     HepB 01/29/2010, 03/01/2010, 09/11/2012      HepB-Adult 01/29/2010, 03/01/2010, 09/11/2012     Hib (PRP-T) 09/11/2012     Influenza (H1N1) 11/11/2009     Influenza (High Dose) 3 valent vaccine 10/09/2014, 10/28/2015, 09/19/2016, 10/30/2017, 09/12/2018, 09/13/2019     Influenza (IIV3) PF 12/22/2004, 09/25/2009, 11/11/2009, 11/16/2010, 12/06/2011, 09/11/2012     Influenza Vaccine IM > 6 months Valent IIV4 (Alfuria,Fluzone) 10/15/2013     Influenza, Quad, High Dose, Pf, 65yr+ (Fluzone HD) 09/24/2020, 11/10/2021     MMR 09/19/2016     Pneumo Conj 13-V (2010&after) 09/11/2012     Pneumococcal 23 valent 06/02/2005, 11/28/2011, 05/09/2013     Poliovirus, inactivated (IPV) 09/11/2012, 09/19/2016     TD (ADULT, 7+) 06/02/2005     TDAP Vaccine (Adacel) 09/11/2012     Zoster vaccine, live 09/19/2016       Current Outpatient Medications   Medication Sig     acetaminophen (TYLENOL) 500 MG tablet Take 1,000 mg by mouth 2 times daily as needed      artificial saliva (BIOTENE MT) SOLN solution Swish and spit 1-2 mLs (1-2 sprays) in mouth 4 times daily     calcium carbonate (TUMS) 500 MG chewable tablet Take 1 chew tab by mouth 2 times daily as needed for heartburn      flecainide (TAMBOCOR) 50 MG tablet TAKE 1 TABLET BY MOUTH TWICE A DAY     fluticasone (FLONASE) 50 MCG/ACT nasal spray Spray 1 spray into both nostrils daily as needed for rhinitis     loratadine (CLARITIN REDITABS) 10 MG ODT Take 10 mg by mouth daily as needed for allergies     Metoprolol Succinate (KAPSPARGO) 25 MG CS24 1 capsule by Tube route daily . Open capsule and add contents to an all plastic oral tip syringe; add 15 mL of water. Gently shake the syringe for ~10 seconds. Immediately deliver mixture through tube. No granules should remain in the syringe; rinse syringe with additional water if necessary.     Multiple Vitamins-Minerals (MULTIVITAMIN GUMMIES ADULT PO) Take 1 each by mouth daily     omeprazole (PRILOSEC) 20 MG DR capsule Take 1 capsule (20 mg) by mouth every morning (Patient taking  "differently: Take 20 mg by mouth daily as needed )     ondansetron (ZOFRAN-ODT) 4 MG ODT tab Take 1 tablet (4 mg) by mouth every 6 hours as needed for nausea     PREVIDENT 5000 DRY MOUTH 1.1 % GEL topical gel Apply to affected area daily      rosuvastatin (CRESTOR) 40 MG tablet Take 1 tablet (40 mg) by mouth At Bedtime     senna-docusate (SENOKOT-S/PERICOLACE) 8.6-50 MG tablet Take 2 tablets by mouth daily as needed for constipation     Starch-Maltodextrin (THICK-IT PO) Mixes with mountain dew code red to take pills with.     vitamin D3 (CHOLECALCIFEROL) 50 mcg (2000 units) tablet Take 1 tablet (50 mcg) by mouth daily     warfarin ANTICOAGULANT (COUMADIN) 2.5 MG tablet Hold warfarin today, restart after INR check tomorrow     carbidopa-levodopa (SINEMET)  MG tablet Start with 1/2 tab daily x 3days then 1/2 tab twice daily for 3-7 days then 1 tab twice daily for one week then 1 tablet 3 times per day. (Patient not taking: Reported on 12/1/2021)     Current Facility-Administered Medications   Medication     ondansetron (ZOFRAN) injection 4 mg        Review of Systems:  I have done 10 points of review systems and all negative except for those mentioned in HPI    Physical examination  Constitutional: Oriented, not in distress  /73   Pulse 80   Temp 97.4  F (36.3  C) (Oral)   Resp 16   Ht 1.804 m (5' 11.02\")   Wt 63.7 kg (140 lb 8 oz)   SpO2 97%   BMI 19.58 kg/m    Eyes: No icterus, nystagmus, pupils isocoric  Head and neck: normal posture and movements  Respiratory: Normal tidal breathing, no shortness of breath, no audible wheezing or stridor, clear to auscultation   Musculoskeletal: Normal muscle mass, no deformity on hands/fingers  Integumentary:  No rash on visible skin areas on video visit  Neurological: Alert, orientedx3  Psychiatric:  Mood and affect are appropriate with insight into his medical condition    Data:  Lab Results   Component Value Date    WBC 10.7 11/01/2021    WBC 8.8 07/05/2021 "     Lab Results   Component Value Date    RBC 4.05 11/01/2021    RBC 4.30 07/05/2021     Lab Results   Component Value Date    HGB 13.0 11/01/2021    HGB 13.8 07/05/2021     Lab Results   Component Value Date    HCT 38.1 11/01/2021    HCT 38.9 07/05/2021     Lab Results   Component Value Date    MCV 94 11/01/2021    MCV 91 07/05/2021     Lab Results   Component Value Date    MCH 32.1 11/01/2021    MCH 32.1 07/05/2021     Lab Results   Component Value Date    MCHC 34.1 11/01/2021    MCHC 35.5 07/05/2021     Lab Results   Component Value Date    RDW 13.2 11/01/2021    RDW 13.0 07/05/2021     Lab Results   Component Value Date    PLT 99 11/01/2021     07/05/2021     Lab Results   Component Value Date     11/04/2021     07/05/2021      Lab Results   Component Value Date    POTASSIUM 4.5 11/04/2021    POTASSIUM 3.3 07/05/2021     Lab Results   Component Value Date    CHLORIDE 106 11/04/2021    CHLORIDE 103 07/05/2021     Lab Results   Component Value Date    GILBERT 7.8 11/04/2021    GILBERT 8.3 07/05/2021     Lab Results   Component Value Date    CO2 30 11/04/2021    CO2 28 07/05/2021     Lab Results   Component Value Date    BUN 31 11/04/2021    BUN 25 07/05/2021     Lab Results   Component Value Date    CR 0.97 11/04/2021    CR 1.15 07/05/2021     Lab Results   Component Value Date     11/04/2021    GLC 97 07/05/2021       ELISA Edward MD

## 2021-12-02 DIAGNOSIS — D47.1 MYELOPROLIFERATIVE DISORDER (H): ICD-10-CM

## 2021-12-02 DIAGNOSIS — Z79.01 LONG TERM CURRENT USE OF ANTICOAGULANT THERAPY: ICD-10-CM

## 2021-12-02 DIAGNOSIS — D89.811 GRAFT-VERSUS-HOST DISEASE, CHRONIC (H): Primary | ICD-10-CM

## 2021-12-02 DIAGNOSIS — K92.0 HEMATEMESIS WITH NAUSEA: ICD-10-CM

## 2021-12-02 DIAGNOSIS — E83.110 HEREDITARY HEMOCHROMATOSIS (H): ICD-10-CM

## 2021-12-02 DIAGNOSIS — C85.90 LYMPHOMA, UNSPECIFIED BODY REGION, UNSPECIFIED LYMPHOMA TYPE (H): ICD-10-CM

## 2021-12-03 ENCOUNTER — ANTICOAGULATION THERAPY VISIT (OUTPATIENT)
Dept: FAMILY MEDICINE | Facility: CLINIC | Age: 71
End: 2021-12-03
Payer: COMMERCIAL

## 2021-12-03 ENCOUNTER — TRANSFERRED RECORDS (OUTPATIENT)
Dept: HEALTH INFORMATION MANAGEMENT | Facility: CLINIC | Age: 71
End: 2021-12-03
Payer: COMMERCIAL

## 2021-12-03 DIAGNOSIS — I48.0 PAROXYSMAL ATRIAL FIBRILLATION (H): ICD-10-CM

## 2021-12-03 DIAGNOSIS — Z79.01 LONG TERM CURRENT USE OF ANTICOAGULANT THERAPY: Primary | ICD-10-CM

## 2021-12-03 DIAGNOSIS — I48.91 ATRIAL FIBRILLATION (H): ICD-10-CM

## 2021-12-03 LAB — INR (EXTERNAL): 1.7 (ref 0.9–1.1)

## 2021-12-03 NOTE — PROGRESS NOTES
ANTICOAGULATION MANAGEMENT     Haresh Rock 71 year old male is on warfarin with subtherapeutic INR result. (Goal INR 2.0-3.0)    Recent labs: (last 7 days)     12/03/21  1035   INR 1.7*       ASSESSMENT     Source(s): Chart Review and Patient/Caregiver Call       Warfarin doses taken: Warfarin taken as instructed    Diet: No new diet changes identified    New illness, injury, or hospitalization: no    Medication/supplement changes: Carbidopa-levodopa 25-100mg started on 12/2 No interaction anticipated    Signs or symptoms of bleeding or clotting: No    Previous INR: Subtherapeutic    Additional findings: None     PLAN     Recommended plan for temporary change(s) affecting INR     Dosing Instructions:  Increase your warfarin dose (5% change) with next INR in 1 week       Summary  As of 12/3/2021    Full warfarin instructions:  2.5 mg every Mon, Thu; 3.75 mg all other days   Next INR check:               Telephone call with Haresh who verbalizes understanding and agrees to plan    Patient to recheck with home meter    Education provided: Contact [phone]  with any changes, questions or concerns.     Plan made per [facility] anticoagulation protocol    Macrina Preston RN  Anticoagulation Clinic  12/3/2021    _______________________________________________________________________     Anticoagulation Episode Summary     Current INR goal:  2.0-3.0   TTR:  61.0 % (11.7 mo)   Target end date:  Indefinite   Send INR reminders to:  CHELSEA CHILEL    Indications    Long-term (current) use of anticoagulants [Z79.01] [Z79.01]  Atrial fibrillation (H) [I48.91]  Antiphospholipid antibody syndrome (H) (Resolved) [D68.61]  Personal history of DVT (deep vein thrombosis) (Resolved) [Z86.718]  Atrial fibrillation  unspecified type (H) (Resolved) [I48.91]  Paroxysmal atrial fibrillation (H) [I48.0]           Comments:  JESSICA rodas to manage by exception         Anticoagulation Care Providers     Provider Role Specialty Phone number     Anya Nowak MD Referring Family Medicine [phone]          Macrina Avila RN, BSN, PHN  Anticoagulation Nurse  [phone]

## 2021-12-07 ENCOUNTER — MYC MEDICAL ADVICE (OUTPATIENT)
Dept: TRANSPLANT | Facility: CLINIC | Age: 71
End: 2021-12-07

## 2021-12-07 ENCOUNTER — ONCOLOGY VISIT (OUTPATIENT)
Dept: TRANSPLANT | Facility: CLINIC | Age: 71
End: 2021-12-07
Attending: INTERNAL MEDICINE
Payer: COMMERCIAL

## 2021-12-07 ENCOUNTER — APPOINTMENT (OUTPATIENT)
Dept: LAB | Facility: CLINIC | Age: 71
End: 2021-12-07
Attending: INTERNAL MEDICINE
Payer: COMMERCIAL

## 2021-12-07 ENCOUNTER — ANTICOAGULATION THERAPY VISIT (OUTPATIENT)
Dept: ANTICOAGULATION | Facility: CLINIC | Age: 71
End: 2021-12-07

## 2021-12-07 VITALS
DIASTOLIC BLOOD PRESSURE: 68 MMHG | WEIGHT: 147.2 LBS | SYSTOLIC BLOOD PRESSURE: 116 MMHG | TEMPERATURE: 96.7 F | OXYGEN SATURATION: 97 % | HEART RATE: 75 BPM | BODY MASS INDEX: 20.52 KG/M2 | RESPIRATION RATE: 14 BRPM

## 2021-12-07 DIAGNOSIS — C85.90 LYMPHOMA, UNSPECIFIED BODY REGION, UNSPECIFIED LYMPHOMA TYPE (H): ICD-10-CM

## 2021-12-07 DIAGNOSIS — D47.1 MYELOPROLIFERATIVE DISORDER (H): ICD-10-CM

## 2021-12-07 DIAGNOSIS — D89.811 GRAFT-VERSUS-HOST DISEASE, CHRONIC (H): ICD-10-CM

## 2021-12-07 DIAGNOSIS — I48.0 PAROXYSMAL ATRIAL FIBRILLATION (H): ICD-10-CM

## 2021-12-07 DIAGNOSIS — Z79.01 LONG TERM CURRENT USE OF ANTICOAGULANT THERAPY: Primary | ICD-10-CM

## 2021-12-07 DIAGNOSIS — E83.110 HEREDITARY HEMOCHROMATOSIS (H): ICD-10-CM

## 2021-12-07 DIAGNOSIS — Z79.01 LONG TERM CURRENT USE OF ANTICOAGULANT THERAPY: ICD-10-CM

## 2021-12-07 DIAGNOSIS — I48.91 ATRIAL FIBRILLATION (H): ICD-10-CM

## 2021-12-07 LAB
ALBUMIN SERPL-MCNC: 3 G/DL (ref 3.4–5)
ALP SERPL-CCNC: 102 U/L (ref 40–150)
ALT SERPL W P-5'-P-CCNC: 8 U/L (ref 0–70)
ANION GAP SERPL CALCULATED.3IONS-SCNC: 6 MMOL/L (ref 3–14)
AST SERPL W P-5'-P-CCNC: 24 U/L (ref 0–45)
BASOPHILS # BLD AUTO: 0 10E3/UL (ref 0–0.2)
BASOPHILS NFR BLD AUTO: 0 %
BILIRUB SERPL-MCNC: 0.7 MG/DL (ref 0.2–1.3)
BUN SERPL-MCNC: 29 MG/DL (ref 7–30)
CALCIUM SERPL-MCNC: 9 MG/DL (ref 8.5–10.1)
CHLORIDE BLD-SCNC: 99 MMOL/L (ref 94–109)
CO2 SERPL-SCNC: 31 MMOL/L (ref 20–32)
CREAT SERPL-MCNC: 0.9 MG/DL (ref 0.66–1.25)
CRP SERPL-MCNC: 19.5 MG/L (ref 0–8)
EOSINOPHIL # BLD AUTO: 0.1 10E3/UL (ref 0–0.7)
EOSINOPHIL NFR BLD AUTO: 2 %
ERYTHROCYTE [DISTWIDTH] IN BLOOD BY AUTOMATED COUNT: 13.4 % (ref 10–15)
ERYTHROCYTE [SEDIMENTATION RATE] IN BLOOD BY WESTERGREN METHOD: 21 MM/HR (ref 0–20)
FERRITIN SERPL-MCNC: 155 NG/ML (ref 26–388)
GFR SERPL CREATININE-BSD FRML MDRD: 84 ML/MIN/1.73M2
GLUCOSE BLD-MCNC: 130 MG/DL (ref 70–99)
HCT VFR BLD AUTO: 41.5 % (ref 40–53)
HGB BLD-MCNC: 14.2 G/DL (ref 13.3–17.7)
IMM GRANULOCYTES # BLD: 0 10E3/UL
IMM GRANULOCYTES NFR BLD: 0 %
INR PPP: 2.17 (ref 0.85–1.15)
IRON SATN MFR SERPL: 36 % (ref 15–46)
IRON SERPL-MCNC: 90 UG/DL (ref 35–180)
LDH SERPL L TO P-CCNC: 168 U/L (ref 85–227)
LYMPHOCYTES # BLD AUTO: 2.4 10E3/UL (ref 0.8–5.3)
LYMPHOCYTES NFR BLD AUTO: 35 %
MCH RBC QN AUTO: 32.9 PG (ref 26.5–33)
MCHC RBC AUTO-ENTMCNC: 34.2 G/DL (ref 31.5–36.5)
MCV RBC AUTO: 96 FL (ref 78–100)
MONOCYTES # BLD AUTO: 0.6 10E3/UL (ref 0–1.3)
MONOCYTES NFR BLD AUTO: 9 %
NEUTROPHILS # BLD AUTO: 3.7 10E3/UL (ref 1.6–8.3)
NEUTROPHILS NFR BLD AUTO: 54 %
NRBC # BLD AUTO: 0 10E3/UL
NRBC BLD AUTO-RTO: 0 /100
PLATELET # BLD AUTO: 125 10E3/UL (ref 150–450)
POTASSIUM BLD-SCNC: 4.5 MMOL/L (ref 3.4–5.3)
PROT SERPL-MCNC: 7.6 G/DL (ref 6.8–8.8)
RBC # BLD AUTO: 4.31 10E6/UL (ref 4.4–5.9)
SODIUM SERPL-SCNC: 136 MMOL/L (ref 133–144)
TIBC SERPL-MCNC: 251 UG/DL (ref 240–430)
TOTAL PROTEIN SERUM FOR ELP: 7.2 G/DL (ref 6.8–8.8)
WBC # BLD AUTO: 6.9 10E3/UL (ref 4–11)

## 2021-12-07 PROCEDURE — 85025 COMPLETE CBC W/AUTO DIFF WBC: CPT | Performed by: INTERNAL MEDICINE

## 2021-12-07 PROCEDURE — 84155 ASSAY OF PROTEIN SERUM: CPT | Performed by: INTERNAL MEDICINE

## 2021-12-07 PROCEDURE — 83550 IRON BINDING TEST: CPT | Performed by: INTERNAL MEDICINE

## 2021-12-07 PROCEDURE — 85652 RBC SED RATE AUTOMATED: CPT | Performed by: INTERNAL MEDICINE

## 2021-12-07 PROCEDURE — 82040 ASSAY OF SERUM ALBUMIN: CPT | Performed by: INTERNAL MEDICINE

## 2021-12-07 PROCEDURE — 83615 LACTATE (LD) (LDH) ENZYME: CPT | Performed by: INTERNAL MEDICINE

## 2021-12-07 PROCEDURE — 85610 PROTHROMBIN TIME: CPT | Performed by: INTERNAL MEDICINE

## 2021-12-07 PROCEDURE — 86140 C-REACTIVE PROTEIN: CPT | Performed by: INTERNAL MEDICINE

## 2021-12-07 PROCEDURE — 36415 COLL VENOUS BLD VENIPUNCTURE: CPT | Performed by: INTERNAL MEDICINE

## 2021-12-07 PROCEDURE — G0463 HOSPITAL OUTPT CLINIC VISIT: HCPCS

## 2021-12-07 PROCEDURE — 99214 OFFICE O/P EST MOD 30 MIN: CPT | Mod: 25 | Performed by: INTERNAL MEDICINE

## 2021-12-07 PROCEDURE — 82728 ASSAY OF FERRITIN: CPT | Performed by: INTERNAL MEDICINE

## 2021-12-07 PROCEDURE — 84165 PROTEIN E-PHORESIS SERUM: CPT | Mod: TC | Performed by: PATHOLOGY

## 2021-12-07 ASSESSMENT — PAIN SCALES - GENERAL: PAINLEVEL: NO PAIN (0)

## 2021-12-07 NOTE — PROGRESS NOTES
Incoming fax     Received from [facility] labs     INR 2.17 on 12/7/21    Martín Rico RN

## 2021-12-07 NOTE — LETTER
12/7/2021         RE: Haresh Rock  [address] [place] [address]        Dear Colleague,    Thank you for referring your patient, Haresh Rock, to the [facility] MARROW TRANSPLANT [facility] [place]. Please see a copy of my visit note below.    BONE MARROW TRANSPLANT VISIT      Haresh returns to the Bone Marrow Transplant Clinic for history of a JAK2 positive atypical myeloproliferative syndrome, status post nonmyeloablative allo-sib peripheral blood stem cell transplant in 2007, complicated by chronic pftpb-yxbwns-xxbs disease, cirrhosis, hemochromatosis with C282Y homozygosity, pulmonary emboli and DVT as well as paroxysmal atrial fibrillation for which he is on warfarin, Hodgkin disease in 2011 s/p ABVD x 5 cycles, and a history of cardiac arrhythmias with an ablation. He was recently diagnosed by Dr. Ángel Hill with Parkinson disease, with weight loss, dysphagia with aspiration, requiring G tube placement, orthostasis, urinary obstruction. Most recent CT and PET 2/26/21 were without evidence for recurrence of malignancy. Hypermetabolic activity at the anorectal junction with diffuse wall thickening was worked up by 3/17/21 flex sigmoidoscopy which showed a 4 mm polyp (removed, tubular adenoma), and erythematous mucosa that was biopsied and found superficial vascular congestion and melanosis coli without evidence for GVHD. Had full colonoscopy 4/14/21 without other findings. He had TURP with Dr. Hawley for urinary obstruction 8/9/21, as medical therapy was causing too much orthostasis.    INTERVAL HISTORY  Haresh was hospitalized in October and had a small bowel resection because of a G tube complication. G tube was replaced. He has been following with Dr. Jacques from ID for pulmonary ground glass opacities and rising CRP, and saw pulmonary on 12/1/21 for potential bronchoscopy. Dr. Edward thought this was ongoing aspiration as he has been still taking some PO and that BAL would have low  "yield. He has gained about 10 lb since November. He gets 3 tube feeds. He isn't sure that he is aspirating. Coughing less than he used to. He did have some Thanksgiving dinner. Tolerated the solids. He is getting around ok. He is shoveling snow and getting up and down steps, so that is an improvement. He uses a walker at night as he is still getting up every 2 hrs to urinate. No more retention. Trying to keep himself hydrated. No fevers, chills, bleeding or bruising, no constipation or diarrhea, back to \"regular\" every 2-3 days and they are solid. No leg edema. Orthostatic symptoms haven't been too bad. No pains anywhere. No chest pains or shortness of breath. He watches a lot of TV and is on his computer. Wife Angelica has her own problems with her back and swallowing issues as well.    PAST MEDICAL HISTORY:  As above    SOCIAL HISTORY:  See my note from 8/2021     FAMILY HISTORY:  See my note from  8/2021     REVIEW OF SYSTEMS:  A 12-point review of systems is done and is negative, except as in the HPI.     PHYSICAL EXAMINATION: /68   Pulse 75   Temp (!) 96.7  F (35.9  C) (Tympanic)   Resp 14   Wt 66.8 kg (147 lb 3.2 oz)   SpO2 97%   BMI 20.52 kg/m     Wt Readings from Last 10 Encounters:   12/07/21 66.8 kg (147 lb 3.2 oz)   12/01/21 63.7 kg (140 lb 8 oz)   11/10/21 61.7 kg (136 lb)   11/04/21 62.5 kg (137 lb 12.8 oz)   10/25/21 64.4 kg (142 lb)   10/22/21 66.2 kg (146 lb)   09/13/21 64 kg (141 lb)   08/30/21 65.7 kg (144 lb 12.8 oz)   08/19/21 68 kg (150 lb)   08/17/21 69.9 kg (154 lb)     EYES:  Grossly normal to inspection, no discharge, erythema or icterus.   SKIN:   skin is clear.  No significant rash or abnormal pigmentation.   NEUROLOGIC:  Cranial nerves seem to be intact.  Mentation and speech is appropriate for age. Left foot drop minimal resting tremor. Muffled voice which is stable.  PSYCHIATRIC:  Mentation appears normal.  He does not seem anxious today.   COR Irreg PACs? 1/6 SAYDA noted at " LSB, reviewed EKG on Apple watch  ABD No organomegaly  RESP Clear to P and A    Recent Labs   Lab Test 12/07/21  0922 08/03/21  1002 07/05/21  1206 06/08/21  1755 05/25/21  1509 02/16/21  1100   WBC 6.9   < > 8.8 7.5 12.0* 9.3   HGB 14.2   < > 13.8 15.0 15.2 15.0   *   < > 136* 133* 144* 135*   ANEU  --   --  4.7 3.9 7.4 6.6   ALYM  --   --  2.8 2.4 3.5 1.9    < > = values in this interval not displayed.     Recent Labs   Lab Test 11/04/21  0604 08/19/21  2049 08/03/21  1002      < > 136   POTASSIUM 4.5   < > 4.1   CHLORIDE 106   < > 103   CO2 30   < > 32   BUN 31*   < > 15   CR 0.97   < > 1.18   GILBERT 7.8*   < > 8.5   MAG 1.8   < >  --    PHOS 2.7   < >  --    LDH  --   --  166    < > = values in this interval not displayed.     Recent Labs   Lab Test 10/31/21  0715 10/30/21  0636 10/29/21  1259 10/27/21  1257   AST 18 16 20 20   ALT 7 9 10 11   ALKPHOS 62 69 86 104   BILITOTAL 1.1 0.7 0.6 0.8     Recent Labs   Lab Test 08/03/21  1002 02/15/21  1014 07/20/20  1714 11/16/15  1155 02/24/15  1114 02/11/14  1200 11/12/13  1355   IGA  --   --   --   --  350 365 292   IGM  --   --   --   --  144 149 133   ELPM 0.0 0.2* 0.1*   < >  --   --   --     < > = values in this interval not displayed.       EKG NSR 1st degree AV block with old septal infact NSR rate 75    ASSESSMENT:     1.  Myeloproliferative syndrome/MDS. STEVIE 2 positive  2.  Status post non-myeloablative allo-sib peripheral blood stem cell transplant 2007  3.  History of chronic jfxhd-cnqjlo-jhyf disease.   4.  History of Hodgkin's disease 2011 s/p ABVD x 5 cycles.   5.  History of cardiac arrhythmias, SVT and V tach.   6.  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   7.  History of cirrhosis.   8.  History of pulmonary emboli.   9.  History of elevated hemoglobin A1c.   10. Thyroid nodule.    11. Cholelithiasis.     12. Diverticulosis.   13. Anticoagulation with warfarin for history of DVT, PE, and paroxysmal  atrial fibrillation  14. Status post bilateral knee replacement.     15. Aortic stenosis  16. 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  17. Seborrheic keratosis of the back.  18. Weight loss, dysphagia, anorexia related to Parkinson's  19. Parkinson's  20. 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 Foot drop Left   22. Chronic aspiration related to dysphagia, ground glass opacities on CT  33. Elevated CRP and ESR    ASSESSMENT:    The elevated CRP and ESR and the ground glass opacities. CRP is improving now, which is reassuring. Hopefully this means that the G tube is helping to prevent some aspiration, and his lungs are improving. We will hold off on repeating a CT chest or getting a bronchoscopy, and will just follow the CRP, ESR for now. Check again in 1 week for his ID appointment.    Counts are a bit better today, back to baseline.   Albumin is improving.    He will continue to follow with neurology for the Parkinson's which appears to be stable, and he will continue the tube feeds. He will follow with his primary care doctor Dr. Hanley as well. We encouraged him to make an appointment.  RTC in 4 months    Arnulfo Degroot MD  Hematology/Oncology Fellow   Attending  The patient was seen and examined by me separate from the resident/fellow provider.The note above reflects my assessment and plan. I have personally reviewed today's labs,vital and radiology results. The points of care that were added by me are:    Seems to be improved with weight gain with tube feeds.CRP has come down Still suspect he has chonic aspiration.Would recheck CRP and consider Chest CT  Will discuss with ID and pulmonary      I spent a total of 30  minutes face to face with Haresh Rock during today's office visit. Over 50% of this time was spent counseling the patient and coordinating the care regarding weight loss parkinson's and dysphagia    Augusto Miller MD   [phone]

## 2021-12-07 NOTE — PROGRESS NOTES
BONE MARROW TRANSPLANT VISIT      Haresh returns to the [facility] for history of a JAK2 positive atypical myeloproliferative syndrome, status post nonmyeloablative allo-sib peripheral blood stem cell transplant in 2007, complicated by chronic czksc-dwgude-htqe disease, cirrhosis, hemochromatosis with C282Y homozygosity, pulmonary emboli and DVT as well as paroxysmal atrial fibrillation for which he is on warfarin, Hodgkin disease in 2011 s/p ABVD x 5 cycles, and a history of cardiac arrhythmias with an ablation. He was recently diagnosed by Dr. Ángel Hill with Parkinson disease, with weight loss, dysphagia with aspiration, requiring G tube placement, orthostasis, urinary obstruction. Most recent CT and PET 2/26/21 were without evidence for recurrence of malignancy. Hypermetabolic activity at the anorectal junction with diffuse wall thickening was worked up by 3/17/21 flex sigmoidoscopy which showed a 4 mm polyp (removed, tubular adenoma), and erythematous mucosa that was biopsied and found superficial vascular congestion and melanosis coli without evidence for GVHD. Had full colonoscopy 4/14/21 without other findings. He had TURP with Dr. Hawley for urinary obstruction 8/9/21, as medical therapy was causing too much orthostasis.    INTERVAL HISTORY  Haresh was hospitalized in October and had a small bowel resection because of a G tube complication. G tube was replaced. He has been following with Dr. Jacques from ID for pulmonary ground glass opacities and rising CRP, and saw pulmonary on 12/1/21 for potential bronchoscopy. Dr. Edward thought this was ongoing aspiration as he has been still taking some PO and that BAL would have low yield. He has gained about 10 lb since November. He gets 3 tube feeds. He isn't sure that he is aspirating. Coughing less than he used to. He did have some Thanksgiving dinner. Tolerated the solids. He is getting around ok. He is shoveling snow and getting up and down steps,  "so that is an improvement. He uses a walker at night as he is still getting up every 2 hrs to urinate. No more retention. Trying to keep himself hydrated. No fevers, chills, bleeding or bruising, no constipation or diarrhea, back to \"regular\" every 2-3 days and they are solid. No leg edema. Orthostatic symptoms haven't been too bad. No pains anywhere. No chest pains or shortness of breath. He watches a lot of TV and is on his computer. Wife Angelica has her own problems with her back and swallowing issues as well.    PAST MEDICAL HISTORY:  As above    SOCIAL HISTORY:  See my note from 8/2021     FAMILY HISTORY:  See my note from  8/2021     REVIEW OF SYSTEMS:  A 12-point review of systems is done and is negative, except as in the HPI.     PHYSICAL EXAMINATION: /68   Pulse 75   Temp (!) 96.7  F (35.9  C) (Tympanic)   Resp 14   Wt 66.8 kg (147 lb 3.2 oz)   SpO2 97%   BMI 20.52 kg/m     Wt Readings from Last 10 Encounters:   12/07/21 66.8 kg (147 lb 3.2 oz)   12/01/21 63.7 kg (140 lb 8 oz)   11/10/21 61.7 kg (136 lb)   11/04/21 62.5 kg (137 lb 12.8 oz)   10/25/21 64.4 kg (142 lb)   10/22/21 66.2 kg (146 lb)   09/13/21 64 kg (141 lb)   08/30/21 65.7 kg (144 lb 12.8 oz)   08/19/21 68 kg (150 lb)   08/17/21 69.9 kg (154 lb)     EYES:  Grossly normal to inspection, no discharge, erythema or icterus.   SKIN:   skin is clear.  No significant rash or abnormal pigmentation.   NEUROLOGIC:  Cranial nerves seem to be intact.  Mentation and speech is appropriate for age. Left foot drop minimal resting tremor. Muffled voice which is stable.  PSYCHIATRIC:  Mentation appears normal.  He does not seem anxious today.   COR Irreg PACs? 1/6 SAYDA noted at LSB, reviewed EKG on Apple watch  ABD No organomegaly  RESP Clear to P and A    Recent Labs   Lab Test 12/07/21  0922 08/03/21  1002 07/05/21  1206 06/08/21  1755 05/25/21  1509 02/16/21  1100   WBC 6.9   < > 8.8 7.5 12.0* 9.3   HGB 14.2   < > 13.8 15.0 15.2 15.0   *   < " > 136* 133* 144* 135*   ANEU  --   --  4.7 3.9 7.4 6.6   ALYM  --   --  2.8 2.4 3.5 1.9    < > = values in this interval not displayed.     Recent Labs   Lab Test 11/04/21  0604 08/19/21 2049 08/03/21  1002      < > 136   POTASSIUM 4.5   < > 4.1   CHLORIDE 106   < > 103   CO2 30   < > 32   BUN 31*   < > 15   CR 0.97   < > 1.18   GILBERT 7.8*   < > 8.5   MAG 1.8   < >  --    PHOS 2.7   < >  --    LDH  --   --  166    < > = values in this interval not displayed.     Recent Labs   Lab Test 10/31/21  0715 10/30/21  0636 10/29/21  1259 10/27/21  1257   AST 18 16 20 20   ALT 7 9 10 11   ALKPHOS 62 69 86 104   BILITOTAL 1.1 0.7 0.6 0.8     Recent Labs   Lab Test 08/03/21  1002 02/15/21  1014 07/20/20  1714 11/16/15  1155 02/24/15  1114 02/11/14  1200 11/12/13  1355   IGA  --   --   --   --  350 365 292   IGM  --   --   --   --  144 149 133   ELPM 0.0 0.2* 0.1*   < >  --   --   --     < > = values in this interval not displayed.       EKG NSR 1st degree AV block with old septal infact NSR rate 75    ASSESSMENT:     1.  Myeloproliferative syndrome/MDS. STEVIE 2 positive  2.  Status post non-myeloablative allo-sib peripheral blood stem cell transplant 2007  3.  History of chronic xzlvm-nuqupl-uhqt disease.   4.  History of Hodgkin's disease 2011 s/p ABVD x 5 cycles.   5.  History of cardiac arrhythmias, SVT and V tach.   6.  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   7.  History of cirrhosis.   8.  History of pulmonary emboli.   9.  History of elevated hemoglobin A1c.   10. Thyroid nodule.    11. Cholelithiasis.     12. Diverticulosis.   13. Anticoagulation with warfarin for history of DVT, PE, and paroxysmal atrial fibrillation  14. Status post bilateral knee replacement.     15. Aortic stenosis  16. 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  17. Seborrheic keratosis of the back.  18. Weight loss, dysphagia, anorexia related to Parkinson's  19.  Parkinson's  20. 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 Foot drop Left   22. Chronic aspiration related to dysphagia, ground glass opacities on CT  33. Elevated CRP and ESR    ASSESSMENT:    The elevated CRP and ESR and the ground glass opacities. CRP is improving now, which is reassuring. Hopefully this means that the G tube is helping to prevent some aspiration, and his lungs are improving. We will hold off on repeating a CT chest or getting a bronchoscopy, and will just follow the CRP, ESR for now. Check again in 1 week for his ID appointment.    Counts are a bit better today, back to baseline.   Albumin is improving.    He will continue to follow with neurology for the Parkinson's which appears to be stable, and he will continue the tube feeds. He will follow with his primary care doctor Dr. Hanley as well. We encouraged him to make an appointment.  RTC in 4 months    Arnulfo Degroot MD  Hematology/Oncology Fellow   Attending  The patient was seen and examined by me separate from the resident/fellow provider.The note above reflects my assessment and plan. I have personally reviewed today's labs,vital and radiology results. The points of care that were added by me are:    Seems to be improved with weight gain with tube feeds.CRP has come down Still suspect he has chonic aspiration.Would recheck CRP and consider Chest CT  Will discuss with ID and pulmonary      I spent a total of 30  minutes face to face with Haresh Rock during today's office visit. Over 50% of this time was spent counseling the patient and coordinating the care regarding weight loss parkinson's and dysphagia    Augusto Miller MD   [phone]

## 2021-12-07 NOTE — NURSING NOTE
"Oncology Rooming Note    December 7, 2021 9:42 AM   Haresh Rock is a 71 year old male who presents for:    Chief Complaint   Patient presents with     Blood Draw     Labs drawn via  by RN in lab. VS taken.      Oncology Clinic Visit     Hodgkins lymphoma      Initial Vitals: /68   Pulse 75   Temp (!) 96.7  F (35.9  C) (Tympanic)   Resp 14   Wt 66.8 kg (147 lb 3.2 oz)   SpO2 97%   BMI 20.52 kg/m   Estimated body mass index is 20.52 kg/m  as calculated from the following:    Height as of 12/1/21: 1.804 m (5' 11.02\").    Weight as of this encounter: 66.8 kg (147 lb 3.2 oz). Body surface area is 1.83 meters squared.  No Pain (0) Comment: Data Unavailable   No LMP for male patient.  Allergies reviewed: Yes  Medications reviewed: Yes    Medications: Medication refills not needed today.  Pharmacy name entered into [facility]:    [id number] - [place], [place] - [address] [id number] - [facility], [place] - [address] AT [address] [place] [address] [address] [place]    Clinical concerns: None       Adriane Klein LPN            "

## 2021-12-07 NOTE — NURSING NOTE
Chief Complaint   Patient presents with     Blood Draw     Labs drawn via  by RN in lab. VS taken.      Labs drawn via venipuncture. Vital signs taken. Checked into next appointment.   Milvia Medeiros RN

## 2021-12-07 NOTE — PROGRESS NOTES
ANTICOAGULATION MANAGEMENT     Haresh Rock 71 year old male is on warfarin with therapeutic INR result. (Goal INR 2.0-3.0)    Recent labs: (last 7 days)     12/07/21  0922   INR 2.17*       ASSESSMENT     Source(s): Chart Review and Patient/Caregiver Call       Warfarin doses taken: Warfarin taken as instructed    Diet: No new diet changes identified    New illness, injury, or hospitalization: No    Medication/supplement changes: carbidopa and levodopa dose increased on 11/30/21 No interaction anticipated    Signs or symptoms of bleeding or clotting: No    Previous INR: Subtherapeutic    Additional findings: Patient states his albumin has increased     PLAN     Recommended plan for no diet, medication or health factor changes affecting INR     Dosing Instructions: Continue your current warfarin dose with next INR in 3 days.  Patient states he checks his INR every Friday.  Advised ok to wait until 12/17/21 but patient stated if he does not check this Friday mdINR will call.    Summary  As of 12/7/2021    Full warfarin instructions:  2.5 mg every Mon, Thu; 3.75 mg all other days   Next INR check:  12/10/2021             Telephone call with Haresh who agrees to plan and repeated back plan correctly    Patient to recheck with home meter    Education provided: Please call back if any changes to your diet, medications or how you've been taking warfarin    Plan made per [facility] anticoagulation protocol    Mayi Ayala, RN  Anticoagulation Clinic  12/7/2021    _______________________________________________________________________     Anticoagulation Episode Summary     Current INR goal:  2.0-3.0   TTR:  60.3 % (11.7 mo)   Target end date:  Indefinite   Send INR reminders to:  CHELSEA CHILEL    Indications    Long-term (current) use of anticoagulants [Z79.01] [Z79.01]  Atrial fibrillation (H) [I48.91]  Antiphospholipid antibody syndrome (H) (Resolved) [D68.61]  Personal history of DVT (deep vein thrombosis) (Resolved)  [Z76.183]  Atrial fibrillation  unspecified type (H) (Resolved) [I48.91]  Paroxysmal atrial fibrillation (H) [I48.0]           Comments:  JESSICA okay to manage by exception         Anticoagulation Care Providers     Provider Role Specialty Phone number    Anya Nowak MD Referring Family Medicine [phone]

## 2021-12-08 ENCOUNTER — TELEPHONE (OUTPATIENT)
Dept: FAMILY MEDICINE | Facility: CLINIC | Age: 71
End: 2021-12-08
Payer: COMMERCIAL

## 2021-12-08 DIAGNOSIS — Z53.9 DIAGNOSIS NOT YET DEFINED: Primary | ICD-10-CM

## 2021-12-08 LAB
ALBUMIN SERPL ELPH-MCNC: 3.4 G/DL (ref 3.7–5.1)
ALPHA1 GLOB SERPL ELPH-MCNC: 0.4 G/DL (ref 0.2–0.4)
ALPHA2 GLOB SERPL ELPH-MCNC: 0.8 G/DL (ref 0.5–0.9)
B-GLOBULIN SERPL ELPH-MCNC: 0.9 G/DL (ref 0.6–1)
GAMMA GLOB SERPL ELPH-MCNC: 1.6 G/DL (ref 0.7–1.6)
M PROTEIN SERPL ELPH-MCNC: 0 G/DL
PROT PATTERN SERPL ELPH-IMP: ABNORMAL

## 2021-12-08 PROCEDURE — G0180 MD CERTIFICATION HHA PATIENT: HCPCS | Performed by: FAMILY MEDICINE

## 2021-12-08 PROCEDURE — 84165 PROTEIN E-PHORESIS SERUM: CPT | Mod: 26 | Performed by: PATHOLOGY

## 2021-12-08 NOTE — TELEPHONE ENCOUNTER
Reason for Call: Request for an order or referral:    Order or referral being requested: Skilled nursing    Date needed: as soon as possible    Has the patient been seen by the PCP for this problem? Not Applicable    Additional comments: [facility] - requesting orders to extend skilled nursing for 1-day a week for 4 weeks.    Phone number Patient can be reached at:  Other phone number:  [phone]    Best Time:  Anytime    Can we leave a detailed message on this number?  YES    Call taken on 12/8/2021 at 3:18 PM by Curtis Tello

## 2021-12-09 ENCOUNTER — DOCUMENTATION ONLY (OUTPATIENT)
Dept: LAB | Facility: CLINIC | Age: 71
End: 2021-12-09
Payer: COMMERCIAL

## 2021-12-09 NOTE — TELEPHONE ENCOUNTER
Called and left detailed message with verbal ok for orders requested per RN protocol. Call back to [phone] if any further questions or concerns.    Mayi Gagnon RN  [facility]

## 2021-12-10 ENCOUNTER — ANTICOAGULATION THERAPY VISIT (OUTPATIENT)
Dept: FAMILY MEDICINE | Facility: CLINIC | Age: 71
End: 2021-12-10
Payer: COMMERCIAL

## 2021-12-10 ENCOUNTER — TRANSFERRED RECORDS (OUTPATIENT)
Dept: HEALTH INFORMATION MANAGEMENT | Facility: CLINIC | Age: 71
End: 2021-12-10
Payer: COMMERCIAL

## 2021-12-10 ENCOUNTER — MEDICAL CORRESPONDENCE (OUTPATIENT)
Dept: HEALTH INFORMATION MANAGEMENT | Facility: CLINIC | Age: 71
End: 2021-12-10
Payer: COMMERCIAL

## 2021-12-10 ENCOUNTER — VIRTUAL VISIT (OUTPATIENT)
Dept: PHARMACY | Facility: CLINIC | Age: 71
End: 2021-12-10
Payer: COMMERCIAL

## 2021-12-10 DIAGNOSIS — N18.2 TYPE 2 DIABETES MELLITUS WITH STAGE 2 CHRONIC KIDNEY DISEASE, WITHOUT LONG-TERM CURRENT USE OF INSULIN (H): ICD-10-CM

## 2021-12-10 DIAGNOSIS — I48.0 PAROXYSMAL ATRIAL FIBRILLATION (H): ICD-10-CM

## 2021-12-10 DIAGNOSIS — Z78.9 TAKES DIETARY SUPPLEMENTS: ICD-10-CM

## 2021-12-10 DIAGNOSIS — E43 SEVERE PROTEIN-CALORIE MALNUTRITION (H): ICD-10-CM

## 2021-12-10 DIAGNOSIS — K21.00 GASTROESOPHAGEAL REFLUX DISEASE WITH ESOPHAGITIS WITHOUT HEMORRHAGE: ICD-10-CM

## 2021-12-10 DIAGNOSIS — I48.91 ATRIAL FIBRILLATION (H): ICD-10-CM

## 2021-12-10 DIAGNOSIS — R33.8 BENIGN PROSTATIC HYPERPLASIA WITH URINARY RETENTION: ICD-10-CM

## 2021-12-10 DIAGNOSIS — Z79.01 LONG TERM CURRENT USE OF ANTICOAGULANT THERAPY: Primary | ICD-10-CM

## 2021-12-10 DIAGNOSIS — I10 ESSENTIAL HYPERTENSION: ICD-10-CM

## 2021-12-10 DIAGNOSIS — E11.22 TYPE 2 DIABETES MELLITUS WITH STAGE 2 CHRONIC KIDNEY DISEASE, WITHOUT LONG-TERM CURRENT USE OF INSULIN (H): ICD-10-CM

## 2021-12-10 DIAGNOSIS — E78.5 HYPERLIPIDEMIA WITH TARGET LDL LESS THAN 100: ICD-10-CM

## 2021-12-10 DIAGNOSIS — J31.0 CHRONIC RHINITIS: ICD-10-CM

## 2021-12-10 DIAGNOSIS — R13.12 OROPHARYNGEAL DYSPHAGIA: ICD-10-CM

## 2021-12-10 DIAGNOSIS — N40.1 BENIGN PROSTATIC HYPERPLASIA WITH URINARY RETENTION: ICD-10-CM

## 2021-12-10 DIAGNOSIS — Z93.1 GASTROSTOMY TUBE IN PLACE (H): Primary | ICD-10-CM

## 2021-12-10 DIAGNOSIS — G20.A1 PARKINSON DISEASE (H): ICD-10-CM

## 2021-12-10 LAB
INR (EXTERNAL): 1.4 (ref 2–3)
INR (EXTERNAL): 1.4 (ref 2–3)

## 2021-12-10 PROCEDURE — 99607 MTMS BY PHARM ADDL 15 MIN: CPT | Performed by: PHARMACIST

## 2021-12-10 PROCEDURE — 99606 MTMS BY PHARM EST 15 MIN: CPT | Performed by: PHARMACIST

## 2021-12-10 NOTE — Clinical Note
Hi Dr. Nowak - I've reviewed all Haresh's pills with him, he's still taking some in the morning, swallowing and really prefers to do this. Is no longer eating anything by mouth so only oral intake is his morning pills, crushing evening ones. I think it's ok for him to maintain this at this time, and offered to help change things if he'd like to switch to crushing pills in the morning too.     Emi Cehn, PharmD  Medication Therapy Management Pharmacist  [phone]

## 2021-12-10 NOTE — PROGRESS NOTES
Medication Therapy Management (MTM) Encounter    ASSESSMENT:                            Medication Adherence/Access: No issues identified    G-tube/Small bowel obstruction, gastrostomy tube in place/oropharyngeal dysphagia/recurrent aspiration: Reviewed meds with patient, he prefers to continue taking morning meds by mouth, which I feel is appropriate at this time.  I recommended we change to tablets and he crush, however he declines this, he will let me know if he'd like to change anything.    BPH:  No current therapy needed.    Parkinson's disease: plan in place     Afib/Hypertension: blood pressure remaining stable with change to metoprolol capsule, ok to continue to swallow in AM.      Flecanaide can have reduced absorption when taken with milk. If your feedings are milk based, you would want to take this 30 min to 1 hour before tube feedings.  Generally, do mix meds with tube feedings - he is  by at least 1 hour from tube feedings.     Type 2 Diabetes: Stable. A1C at goal <7%.     Hyperlipidemia: Stable.  Patient is on high intensity statin which is indicated based on 2019 ACC/AHA guidelines for lipid management.      GERD: Stable. PPI not necessary, patient will consider stopping.    Bowels:  Stable.     Allergic Rhinitis: Stable.     Pain: Stable.     Supplements: Stable.     PLAN:                            1. Continue current medications.    2. I think it's ok to continue taking your morning pills by mouth for now - if you'd like to change to tablets at any point let me know and I can assist with that.     Follow-up: Return in about 4 months (around 4/10/2022) for Medication Therapy Management.    SUBJECTIVE/OBJECTIVE:                          Haresh Rock is a 71 year old male called for a follow-up visit. He was referred to me from  Anya Nowak for G tube management - need alternatives to metoprolol XL 25 mg and avodart 0.5 mg, see phone enconter dated 11/8/21.  Today's visit is a  follow-up [facility] visit from 11/26/21.     Reason for visit: blood pressure follow-up.    Allergies/ADRs: Reviewed in chart  Past Medical History: Reviewed in chart  Tobacco: He reports that he has never smoked. He has never used smokeless tobacco.  Alcohol: none  Caffeine: mountain dew code red    Medication Adherence/Access:   Has a G-tube    8 am morning pills - swallows  9-11 am feeding  1-2 pm - he'll start the Sinemet next week TID, mid-day dose will be ~1-2 pm.  3-5 pm feeding  7 pm evening pills - crushes and mixes with water in syringe and administers through g-tube  8-10 pm feeding    Drinks mountain dew code red, mixes with thick-it so he can swallow it better when he takes his pills by mouth - he has no issues with this and prefers to continue this for morning pills.     G-tube/Small bowel obstruction, gastrostomy tube in place/oropharyngeal dysphagia/recurrent aspiration:   He prefers to take pills by mouth in the morning (the capsules) and take the pills he can crush in the evening, mix with water and syringe, and administer through g tube.     Per notes in the chart, due to aspiration concerns he should be taking all pills by g-tube, however he denies having any issues with swallowing morning pills.  The only ones he takes in the morning are flecanide (AM dose only), Sinemet (AM dose only), Vitamin D, omeprazole, and metoprolol capsule.  He also takes extra water by the g-tube (120 mL) after he takes his pills by mouth, then waits an hour before doing his feeding. Then waits at least 2 hours after feeding before taking any pills.  Hasn't had any food by mouth since Thanksgiving.     Isn't using Biotine spray - he is worried about aspirating this.     History:  Hospitalized in August with pneumonia due to aspiration, afterward the G-tube was recommended. It was a possibility that had been discussed for ~ 1 year, but has only been placed recently (10/25/21). Can still swallow, just difficult especially with  food.  During placement of the G-tube, the small intestine was punctured, he had emergency surgery to correct this, never developed any infection, he's been doing well since then.  He does feedings himself, 3x/day. He's not sure if the contain milk ingredients.     BPH:    Dr. Hawley stopped his Avodart, no longer needed.     Parkinson's disease:   Carbidopa-levodopa  mg tablets - titrating up on this, currently at 1 tablet twice daily.    Has upcomming appt with Leslie Clifford, PharmD in neurology. Denies any side effects thus far.     Afib/Hypertension:   Flecainide 50 mg twice daily (crushes in evening and and takes by mouth in the morning)  Metoprolol ER 25 mg every morning, swallowing  Warfarin per [facility] - currently being adjusted due to formula tube feedings. He checks INR at home. He'll connect with them later today.     He tried opening metoprolol and administering via syringe in g-tube, but difficult to open.  He prefers to swallow in the morning instead and reports this is going well.  Patient does not have a history of GI bleed.   Patient reports no current medication side effects.  He does monitor blood pressure at home: 110-120/60-70 mmHg. Home health nurse got similar reading when she checked this week, slightly lower.   INR   Date Value Ref Range Status   07/16/2021 1.9 (A) 2 - 3 Final     INR Protime   Date Value Ref Range Status   11/19/2021 2.4 (A) 0.9 - 1.1 Final     INR (External)   Date Value Ref Range Status   12/10/2021 1.4 (A) 2 - 3 Final      BP Readings from Last 3 Encounters:   12/07/21 116/68   12/01/21 116/73   11/10/21 114/66     Type 2 Diabetes:   No current medications.  Urine Albumin:   Lab Results   Component Value Date    UMALCR 62.45 (H) 07/27/2021     Lab Results   Component Value Date    A1C 5.5 08/20/2021    A1C 5.4 05/25/2021    A1C 5.5 01/15/2021    A1C 6.3 07/20/2020    A1C 6.9 04/23/2019    A1C 6.4 01/25/2018     Hyperlipidemia:  rosuvastatin 40 mg daily  in evening, crushing    Patient reports no significant myalgias or other side effects.  Recent Labs   Lab Test 08/20/21  0752 01/15/21  0817 04/18/16  1659 11/16/15  1155 07/09/14  1511   CHOL 102 110   < > 126 168   HDL 40 36*   < > 31* 29*   LDL 50 55   < > 71 104   TRIG 61 96   < > 119 174*   CHOLHDLRATIO  --   --   --  4.1 5.8*    < > = values in this interval not displayed.     GERD:   Tums 500 mg chewables twice daily PRN (minimal use)  Omeprazole 20 mg daily every morning - he's not sure if he needs this    Omeprazole was initially prescribed for hematemesis right after his stomach tube was placed. Previously was on pantoprazole, he stopped it last summer because he didn't feel he needed this. No longer having hematemesis, no issues with heartburn currently. No nausea - hasn't needed the Zofran ODT.      Bowels:    Senna-S 2 tablets daily PRN (was on prior to g-tube placement).      Bowels haven't been back to normal completely, but he's not having true constipation. Just not as many bowel movements.    Allergic Rhinitis:   fluticasone nasal spray 1 spray(s) once daily PRN.   Loratadine 10 mg daily PRN (takes most days) - he has the dissolvable kind.     Patient feels that current therapy is effective. Recently saw pulmonology.     Pain:   Acetaminophen 1000 mg daily PRN - by mouth or crush.     States this/these are effective, infrequent use. Denies side effects.     Supplements:   Multivitamin daily - gummy   Vitamin D 50 mcg daily, capsule, by mouth every morning - has 3 months supply OTC, prefers to use this up (been on since ~June)    Chewing the multivitamin caused more aspiration due to the chalky nature of them, so he switched to the chewable gummies. (his Tums are not chalky).  Vitamin D Deficiency Screening Results:  Lab Results   Component Value Date    VITDT 19 (L) 06/08/2021    VITDT 18 (L) 02/15/2021     No reported issues at this time.   Today's Vitals: There were no vitals taken for this  visit.  ----------------      I spent 25 minutes with this patient today. All changes were made via collaborative practice agreement with Anya Nowak. A copy of the visit note was provided to the patient's primary care provider.    The patient was sent via [employer] a summary of these recommendations.     Emi Chen, PharmD  Medication Therapy Management Pharmacist  [phone]    Telemedicine Visit Details  Type of service:  Telephone visit  Start Time: 10:37 AM  End Time: 11:02 AM  Originating Location (patient location): [place]  Distant Location (provider location):  [facility]     Medication Therapy Recommendations  No medication therapy recommendations to display

## 2021-12-10 NOTE — PATIENT INSTRUCTIONS
Recommendations from today's MTM visit:                                                       1. Continue current medications.    2. I think it's ok to continue taking your morning pills by mouth for now - if you'd like to change to tablets at any point let me know and I can assist with that.     Follow-up: Return in about 4 months (around 4/10/2022) for Medication Therapy Management.    It was great to speak with you today.  I value your experience and would be very thankful for your time with providing feedback on our clinic survey. You may receive a survey via email or text message in the next few days.     To schedule another MTM appointment, please call the clinic directly or you may call the MTM scheduling line at [phone] or toll-free at [phone].     My Clinical Pharmacist's contact information:                                                      Please feel free to contact me with any questions or concerns you have.      Emi Chen, PharmD  Medication Therapy Management Pharmacist  [phone]

## 2021-12-10 NOTE — PROGRESS NOTES
ANTICOAGULATION MANAGEMENT     Haresh Rock 71 year old male is on warfarin with subtherapeutic INR result. (Goal INR 2.0-3.0)    Recent labs: (last 7 days)     12/10/21  1248   INR 1.4*       ASSESSMENT     Source(s): Chart Review and Patient/Caregiver Call       Warfarin doses taken: Warfarin taken as instructed    Diet: No new diet changes identified    New illness, injury, or hospitalization: No    Medication/supplement changes: None noted    Signs or symptoms of bleeding or clotting: No    Previous INR: Therapeutic last visit; previously outside of goal range    Additional findings: None     PLAN     Recommended plan for no diet, medication or health factor changes affecting INR     Dosing Instructions: Booster dose then Increase your warfarin dose (10.5% change) with next INR in 1 week       Summary  As of 12/10/2021    Full warfarin instructions:  12/10: 5 mg; Otherwise 3.75 mg every day   Next INR check:               Telephone call with Haresh who verbalizes understanding and agrees to plan and who agrees to plan and repeated back plan correctly    Patient to recheck with home meter    Education provided: Please call back if any changes to your diet, medications or how you've been taking warfarin    Plan made per [facility] anticoagulation protocol    Belinda Lafleur, RN  Anticoagulation Clinic  12/10/2021    _______________________________________________________________________     Anticoagulation Episode Summary     Current INR goal:  2.0-3.0   TTR:  60.5 % (11.7 mo)   Target end date:  Indefinite   Send INR reminders to:  CHELSEA CHILEL    Indications    Long-term (current) use of anticoagulants [Z79.01] [Z79.01]  Atrial fibrillation (H) [I48.91]  Antiphospholipid antibody syndrome (H) (Resolved) [D68.61]  Personal history of DVT (deep vein thrombosis) (Resolved) [Z86.718]  Atrial fibrillation  unspecified type (H) (Resolved) [I48.91]  Paroxysmal atrial fibrillation (H) [I48.0]           Comments:  JESSICA  okay to manage by exception         Anticoagulation Care Providers     Provider Role Specialty Phone number    Anya Nowak MD Referring Family Medicine [phone]

## 2021-12-10 NOTE — PROGRESS NOTES
Incoming fax    Receieved on 12/10/21    From [facility]   INR 1.4       Macrina Avila RN, BSN, PHN

## 2021-12-10 NOTE — Clinical Note
David Nelson- you see this patient Monday, tapering up on Sinemet.  I'd like to get your opinion of whether or not it's appropriate for him to take his morning pills by mouth (taking evening ones by crushing and using g-tube), from what I've discussed with him, I think it is appropriate but appreciate your insight.  I will also be going on maternity leave in the next few weeks and told him he can follow-up with you during this time, or he can reach out to me with questions and Edwina Molina can get back to him.    Thanks!  Emi Chen, PharmD  Medication Therapy Management Pharmacist  [phone]

## 2021-12-13 ENCOUNTER — LAB (OUTPATIENT)
Dept: LAB | Facility: CLINIC | Age: 71
End: 2021-12-13

## 2021-12-13 ENCOUNTER — MEDICAL CORRESPONDENCE (OUTPATIENT)
Dept: HEALTH INFORMATION MANAGEMENT | Facility: CLINIC | Age: 71
End: 2021-12-13

## 2021-12-13 ENCOUNTER — PATIENT OUTREACH (OUTPATIENT)
Dept: NURSING | Facility: CLINIC | Age: 71
End: 2021-12-13
Payer: COMMERCIAL

## 2021-12-13 ENCOUNTER — DOCUMENTATION ONLY (OUTPATIENT)
Dept: NEUROLOGY | Facility: CLINIC | Age: 71
End: 2021-12-13
Payer: COMMERCIAL

## 2021-12-13 ENCOUNTER — VIRTUAL VISIT (OUTPATIENT)
Dept: PHARMACY | Facility: CLINIC | Age: 71
End: 2021-12-13
Attending: PSYCHIATRY & NEUROLOGY
Payer: COMMERCIAL

## 2021-12-13 DIAGNOSIS — G20.A1 PARKINSON DISEASE (H): Primary | ICD-10-CM

## 2021-12-13 LAB
HOLD SPECIMEN: NORMAL

## 2021-12-13 PROCEDURE — 99606 MTMS BY PHARM EST 15 MIN: CPT | Performed by: PHARMACIST

## 2021-12-13 PROCEDURE — 99607 MTMS BY PHARM ADDL 15 MIN: CPT | Performed by: PHARMACIST

## 2021-12-13 RX ORDER — CARBIDOPA AND LEVODOPA 25; 100 MG/1; MG/1
1 TABLET, ORALLY DISINTEGRATING ORAL 3 TIMES DAILY
Qty: 90 TABLET | Refills: 11 | Status: SHIPPED | OUTPATIENT
Start: 2021-12-13 | End: 2022-01-03

## 2021-12-13 NOTE — PROGRESS NOTES
Medication Therapy Management (MTM) Encounter    ASSESSMENT:                            Medication Adherence/Access:  Patient crushes and mixes warfarin, flecainide, rosuvastatin and Sinemet together and there are no interactions found with this combination    Parkinson's disease: improving - Patient is interested in switching to carbidopa/levodopa ODT (orally disintegrating tablet) for convenience. He will continue with the titration as planned.     PLAN:                            1. Sinemet (carbidopa/levodopa): when you are due for a refill, you may switch from the regular tablets to the orally disintegrating tablets (ODT). You can take the ODT form without water as it disintegrates on your tongue before swallowing.     2. The remainder of your medication schedule is appropriately timed with feedings.     Follow-up: 1/3/2022 at 10 am via telephone    SUBJECTIVE/OBJECTIVE:                          Haresh Rock is a 71 year old `male called for a follow up visit. He was referred to me from Dr. Hill. Last MTM visit was 12/10/21 with Emi Chen, PharmD.     Reason for visit: Medication review for Parkinson's disease.    Allergies/ADRs: None  Past Medical History: Reviewed in chart  Tobacco: He reports that he has never smoked. He has never used smokeless tobacco.  Alcohol: none    Medication Adherence/Access: Patient administers some medications via G-tube. Patient takes pills 1 hour before feeding and waits at least 2 hours after feeding. Sample schedule according to MTM visit notes on 12/10/2021:    8 am - swallow morning pills by mouth including flecanide (AM dose only), Sinemet (AM dose only), Vitamin D, omeprazole, and metoprolol capsule.    9-11 am feeding    1-2 pm - crushed Sinemet dose via G-tube    3-5 pm feeding    7 pm evening pills - crushes warfarin, flecainide, rosuvastatin and Sinemet then mixes all together with 120 mL water in syringe and administers through g-tube. Also takes extra 120 mL water  by g-tube after taking pills.    8-10 pm feeding    Parkinson's Disease: Patient is currently taking 1 tablet of Sinemet (carbidopa/levodopa  mg) twice daily at 1-2 pm and 7 pm (via G-tube). He is currently on titration and the plan is to start 1 tablet 3 times daily (7am, 2pm and 7pm) starting 12/17/21.     He only experiences weak voice and denies other Parkinson's symptoms such as tremors, weakness or rigidity. He noticed some improvement in voice in the last week since starting carbidopa-levodopa but was not sure if related to the medication or not. Reports inflammatory markers have improved per recent blood work.     Today's Vitals: There were no vitals taken for this visit.  ----------------    I spent 26 minutes with this patient today. All changes were made via collaborative practice agreement with Dr. Hill. A copy of the visit note was provided to the patient's referring provider.    The patient was sent via Fin[employer] a summary of these recommendations.     Laura Rice, pharmacy student    Leslie Clifford, Pharm.D.  Medication Therapy Management Pharmacist  [facility]    Telemedicine Visit Details  Type of service:  Telephone visit  Start Time: 15:15  End Time: 15:41  Originating Location (patient location): Home  Distant Location (provider location):  [facility]OLOG[facility]     Medication Therapy Recommendations  Parkinson's disease (H)    Current Medication: carbidopa-levodopa (SINEMET)  MG tablet   Rationale: Cannot swallow/administer medication - Adherence - Adherence   Recommendation: Change Medication - carbidopa-levodopa  MG ODT   Status: Accepted per CPA

## 2021-12-13 NOTE — Clinical Note
Switching formulation of carbidopa-levodopa IR tabs to ODT as he would prefer to take these by mouth

## 2021-12-13 NOTE — PATIENT INSTRUCTIONS
Recommendations from today's MTM visit:                                                      1. Sinemet (carbidopa/levodopa): when you are due for a refill, you may switch from the regular tablets to the orally disintegrating tablets (ODT). You can take the ODT form without water as it disintegrates on your tongue before swallowing.     2. The remainder of your medication schedule is appropriately timed with feedings.     Follow-up: 1/3/2022 at 10 am via telephone    It was great to speak with you today.  I value your experience and would be very thankful for your time with providing feedback on our clinic survey. You may receive a survey via email or text message in the next few days.     To schedule another MTM appointment, please call the clinic directly or you may call the MTM scheduling line at [phone] or toll-free at [phone].     My Clinical Pharmacist's contact information:                                                      Please feel free to contact me with any questions or concerns you have.      Leslie Clifford, Pharm.D.  Medication Therapy Management Pharmacist  [facility] Neu[facility]

## 2021-12-13 NOTE — PROGRESS NOTES
Clinic Care Coordination Contact    Follow Up Progress Note      Assessment: The RN CC nurse care coordinator contacted the patient by phone for a follow up call.  The patient states that he was outside using the snowblower.  The patient's speach is much clearer and easier to understand.  The patient states that he has started the new medication for his Parkinson's without any side effects following the gradual increase in the dosage.  The patient completed his current dose so that was marked accordingly and 2 new goals were made with the patient.       Care Gaps:    Health Maintenance Due   Topic Date Due     ZOSTER IMMUNIZATION (2 of 3) 11/14/2016     DIABETIC FOOT EXAM  04/23/2020     EYE EXAM  01/01/2022       Care Gaps Last addressed on 12/13/21, Care Gap Goal set: Yes and Patient accepted scheduling phone number for [phone]  to schedule independently     Goals addressed this encounter:   Goals Addressed                    This Visit's Progress       COMPLETED: #1  Functional (pt-stated)         Goal Statement: I would like to be able to walk without the walker.  I need to increase my strength and stamina in order to get back to this level of walking by performing the exercises from PT, and practicing walking around the room as tolerated.  Date Goal set: 11/5/21  Barriers: recent hospitalization  Strengths: Engaged in care coordination  Date to Achieve By: 5/5/2022  Patient expressed understanding of goal: Yes  Action steps to achieve this goal:  1. I will walk around the house with the walker to increase my strength and stamina.  2. I will practice the exercises from PT between sessions.   3. I will continue to work towards walking without the walker as tolerated.           #1  Other - Care Gaps (pt-stated)         Goal Statement: I will work with my PCP to close my care gaps by scheduling an eye exam, and scheduling a diabetic foot exam.  Date Goal set: 12/13/21  Barriers: The patient suffers from  Parkinson.  Strengths: engaged in care coordination  Date to Achieve By: 6/13/22  Patient expressed understanding of goal: yes  Action steps to achieve this goal:  1. I will speak with my PCP about scheduling a diabetic foot exam.  2. I will make an appointment for an annual eye exam.  3. I will ask my PCP about which immunization are appropriate for me.           #2  Functional (pt-stated)         Goal Statement: I will work on walking without the walker, and increasing my strength and stamina.  Date Goal set: 12/13/21  Barriers: suffers from parkinson's  Strengths: engaged in care coordination  Date to Achieve By: 6/13/22  Patient expressed understanding of goal: yes  Action steps to achieve this goal:  1. I will practice in the house walking without the walker.  2. I will go outside without the walker when I feel strong enough and have increased my strength and stamina.  3. I will frequently walk around inside the house to increase my strength and stamina.              Intervention/Education provided during outreach: worked on new goals for the patient.     Outreach Frequency: monthly    Plan:   1.  The patient will work with his PCP to schedule the care gaps that he has such as an eye exam, and a diabetic foot exam.  2.  The patient will work on increasing his strength and stamina in order to walk without the walker.   3.  The patient will work on walking without the walker in the house before going outside.    RN CC Nurse Care Coordinator will follow up in 4 weeks.              Uriel Zhou MSN, RN, PHN, CCM   Primary Care Clinical RN Care Coordinator  [facility]  12/13/2021   3:38 PM  [email or web address]  Office: [phone]

## 2021-12-14 ENCOUNTER — VIRTUAL VISIT (OUTPATIENT)
Dept: INFECTIOUS DISEASES | Facility: CLINIC | Age: 71
End: 2021-12-14
Attending: INTERNAL MEDICINE
Payer: COMMERCIAL

## 2021-12-14 DIAGNOSIS — R70.0 ELEVATED ERYTHROCYTE SEDIMENTATION RATE: Primary | ICD-10-CM

## 2021-12-14 DIAGNOSIS — R79.82 ELEVATED C-REACTIVE PROTEIN (CRP): ICD-10-CM

## 2021-12-14 PROCEDURE — 99214 OFFICE O/P EST MOD 30 MIN: CPT | Mod: 95 | Performed by: INTERNAL MEDICINE

## 2021-12-14 ASSESSMENT — PAIN SCALES - GENERAL: PAINLEVEL: NO PAIN (0)

## 2021-12-14 NOTE — PROGRESS NOTES
Haresh Rock is a 71 year old man who is being evaluated via a billable video visit.      ** patient can do Doximity **    How would you like to obtain your AVS? MyChart  If the video visit is dropped, the invitation should be resent by: Text to cell phone: [phone]  Will anyone else be joining your video visit? No       Reason for visit:   Infectious Disease Return Visit, planned follow-up for elevated ESR     SUBJECTIVE:     Haresh is in great spirits on our visit today and tells me that he is feeling better today (and so far this week) than he has in the last 6 months.   The tube feeds are now finally going well and he has gained 4-5 lbs.  He is not taking anything by mouth.    He saw pulmonology per my request.  Pulm felt the risk of bronchoscopy and BAL outweighed the potential for diagnostic yield.  They hoped that the CT Chest findings represented chronic aspiration, and that this would resolve now that Haresh is not taking anything by mouth.    Haresh has not had fevers since August 2021.   No new symptoms.   Stable respiratory symptoms.       I have reviewed and updated the patient's Past Medical History, Social History, Family History and Medication List.     OBJECTIVE:         Current Outpatient Medications   Medication     acetaminophen (TYLENOL) 500 MG tablet     artificial saliva (BIOTENE MT) SOLN solution     calcium carbonate (TUMS) 500 MG chewable tablet     flecainide (TAMBOCOR) 50 MG tablet     fluticasone (FLONASE) 50 MCG/ACT nasal spray     loratadine (CLARITIN REDITABS) 10 MG ODT     Metoprolol Succinate (KAPSPARGO) 25 MG CS24     Multiple Vitamins-Minerals (MULTIVITAMIN GUMMIES ADULT PO)     omeprazole (PRILOSEC) 20 MG DR capsule     ondansetron (ZOFRAN-ODT) 4 MG ODT tab     PREVIDENT 5000 DRY MOUTH 1.1 % GEL topical gel     rosuvastatin (CRESTOR) 40 MG tablet     senna-docusate (SENOKOT-S/PERICOLACE) 8.6-50 MG tablet     Starch-Maltodextrin (THICK-IT PO)     vitamin D3 (CHOLECALCIFEROL) 50  mcg (2000 units) tablet     warfarin ANTICOAGULANT (COUMADIN) 2.5 MG tablet              Allergies   Allergen Reactions     Seasonal Allergies           Exam via video visit:     GENERAL: Patient appears chronically ill but is not in any acute distress    HEENT: The eyes do not appear to have any icterus or injection.  EOM appear intact.  RESPIRATORY:  No cough or labored breathing is noted.  SKIN:  No rashes are visible  NEURO:  Awake, alert, no focal neurologic deficits are noted.  PSYCH: Affect is bright. Speech fluent and appropriate.      The rest of a comprehensive physical examination is deferred due to the public health emergency video visit restrictions.     Labs  Reviewed extensively in Epic     12/7/2021    CRP 19.5  ESR 21    11/1/2021 CRP  180 mg/dL  CBC with WBC of 10.7, platelets 98     10/14/2021 ESR  105     For comparison:  8/3/2021 with ESR of 90 and CRP of 12 mg/dL.   CBC with normal WBC and platelets of 147     Imaging  2/26/2021  PET with CT:  In this patient with history of Hodgkin lymphoma:  1. Nodular opacities in the left lower lobe with associated  endobronchial debris, also by basilar groundglass opacities which may  represent infection/inflammation versus aspiration, including Covid.  2. Mildly hypermetabolic left hilar and subcarinal lymph node, favored  to be reactive.  3. No hypermetabolic lymphadenopathy in the abdomen or pelvis.  4. Hypermetabolic activity at the anorectal junction with diffuse wall  thickening on CT. Recommend direct visualization.  5. Punctate calcific density at the right UVJ without associated  hydroureter. This may represent recently passed renal stone versus  bladder calculus.  6. Additional incidental findings are stable, including cholelithiasis  without acute cholecystitis, diverticulosis without acute  diverticulitis, and prostatomegaly.     10/14/2021  CT Chest  IMPRESSION:   1. Decreased conspicuity of the left lower lobe predominant nodular  opacities,  which now appear more groundglass and solid. This may  represents resolving inflammation or infection.  2. Stable sub-6 mm pulmonary nodules. These may be infectious,  inflammatory or neoplastic in etiology. Attention on follow-up.  3. Cholelithiasis without evidence of cholecystitis.     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 He also has hereditary hemachromatosis with cirrhosis, and a recent diagnosis of Parkinson's disease. He also has progressive weight loss over several months, but also has dysphagia related to the Parkinson's, and his diet was fairly limited before recent feeding tube placement.      Upon extensive lab evaluation he was found to have an ESR of 92 on 2/15/21 and this had remained persistently elevated into 11/2021.   CRP also began to rising from 12 mg/dL in August to 180 mg/dL in October.  Now both of these parameters are significantly downtrending.    We suspect this is due to Haresh taking nothing by mouth, and relying entirely now on tube feedings.    The consensus is that elevated ESR and CRP were due to chronic recurrent aspiration.           PLAN:     - Very happy to see ESR and CRP downtrending, with apparent improvement with moving to all tube feeds    - The patient felt best with a follow-up to check in on how he is doing.    We will set this up for 3 months from now, 3/15/2022.      It has been my pleasure to be involved in the care of Mr. Haresh Rock, and I wish him well over the next few months.         Video-Visit Details    Type of service:  Video Visit    Video Start Time: 5:00 PM  Video End Time:  5:10 PM    Originating Location (pt. Location): Home    Distant Location (provider location):  [facility]  INFECTIOUS DISEASE CLINIC [place]     Platform used for Video Visit: Carolann Jacques MD, MS  Infectious Disease       MDM billing:  Level 3  One chronic problem, improving  Reviewed with independent interpretation CRP and ESR

## 2021-12-14 NOTE — LETTER
12/14/2021       RE: Haresh Rock  [address]  [place] [address]     Dear Colleague,    Thank you for referring your patient, Haresh Rock, to the [facility] INFECTIOUS DISEASE CLINIC [facility]. Please see a copy of my visit note below.      Haresh Rock is a 71 year old man who is being evaluated via a billable video visit.      ** patient can do Doximity **    How would you like to obtain your AVS? MyChart  If the video visit is dropped, the invitation should be resent by: Text to cell phone: [phone]  Will anyone else be joining your video visit? No                Again, thank you for allowing me to participate in the care of your patient.      Sincerely,    Marcelo Jacques MD

## 2021-12-16 DIAGNOSIS — R91.8 GROUND GLASS OPACITY PRESENT ON IMAGING OF LUNG: Primary | ICD-10-CM

## 2021-12-17 ENCOUNTER — ANTICOAGULATION THERAPY VISIT (OUTPATIENT)
Dept: FAMILY MEDICINE | Facility: CLINIC | Age: 71
End: 2021-12-17
Payer: COMMERCIAL

## 2021-12-17 ENCOUNTER — TRANSFERRED RECORDS (OUTPATIENT)
Dept: HEALTH INFORMATION MANAGEMENT | Facility: CLINIC | Age: 71
End: 2021-12-17
Payer: COMMERCIAL

## 2021-12-17 ENCOUNTER — MEDICAL CORRESPONDENCE (OUTPATIENT)
Dept: HEALTH INFORMATION MANAGEMENT | Facility: CLINIC | Age: 71
End: 2021-12-17
Payer: COMMERCIAL

## 2021-12-17 DIAGNOSIS — I48.91 ATRIAL FIBRILLATION (H): ICD-10-CM

## 2021-12-17 DIAGNOSIS — Z79.01 LONG TERM CURRENT USE OF ANTICOAGULANT THERAPY: Primary | ICD-10-CM

## 2021-12-17 DIAGNOSIS — I48.0 PAROXYSMAL ATRIAL FIBRILLATION (H): ICD-10-CM

## 2021-12-17 LAB — INR (EXTERNAL): 1.9 (ref 0.9–1.1)

## 2021-12-17 NOTE — PROGRESS NOTES
ANTICOAGULATION MANAGEMENT     Haresh Rock 71 year old male is on warfarin with subtherapeutic INR result. (Goal INR 2.0-3.0)    Recent labs: (last 7 days)     12/17/21  1209   INR 1.9*       ASSESSMENT     Source(s): Chart Review and Patient/Caregiver Call       Warfarin doses taken: Warfarin taken as instructed    Diet: No new diet changes identified    New illness, injury, or hospitalization: No    Medication/supplement changes: None noted    Signs or symptoms of bleeding or clotting: No    Previous INR: Subtherapeutic    Additional findings: None     PLAN     Recommended plan for no diet, medication or health factor changes affecting INR     Dosing Instructions:  Increase your warfarin dose (4.8% change) with next INR in 1 week       Summary  As of 12/17/2021    Full warfarin instructions:  5 mg every Fri; 3.75 mg all other days   Next INR check:               Telephone call with Haresh who verbalizes understanding and agrees to plan    Patient to recheck with home meter    Education provided: Contact [phone]  with any changes, questions or concerns.     Plan made per [facility] anticoagulation protocol    Macrina Preston RN  Anticoagulation Clinic  12/17/2021    _______________________________________________________________________     Anticoagulation Episode Summary     Current INR goal:  2.0-3.0   TTR:  59.3 % (11.7 mo)   Target end date:  Indefinite   Send INR reminders to:  CHELSEA CHILEL    Indications    Long-term (current) use of anticoagulants [Z79.01] [Z79.01]  Atrial fibrillation (H) [I48.91]  Antiphospholipid antibody syndrome (H) (Resolved) [D68.61]  Personal history of DVT (deep vein thrombosis) (Resolved) [Z86.718]  Atrial fibrillation  unspecified type (H) (Resolved) [I48.91]  Paroxysmal atrial fibrillation (H) [I48.0]           Comments:  JESSICA rodas to manage by exception         Anticoagulation Care Providers     Provider Role Specialty Phone number    Anya Nowak MD Referring  Family Medicine [phone]          Macrina Avila, RN, BSN, PHN  [facility] Nurse  [phone]

## 2021-12-20 ENCOUNTER — LAB (OUTPATIENT)
Dept: LAB | Facility: CLINIC | Age: 71
End: 2021-12-20
Payer: COMMERCIAL

## 2021-12-20 DIAGNOSIS — D89.811 GRAFT-VERSUS-HOST DISEASE, CHRONIC (H): ICD-10-CM

## 2021-12-20 DIAGNOSIS — I48.0 PAROXYSMAL ATRIAL FIBRILLATION (H): ICD-10-CM

## 2021-12-20 DIAGNOSIS — R91.8 PULMONARY INFILTRATES: ICD-10-CM

## 2021-12-20 DIAGNOSIS — Z86.711 PERSONAL HISTORY OF PE (PULMONARY EMBOLISM): ICD-10-CM

## 2021-12-20 DIAGNOSIS — D47.1 MYELOPROLIFERATIVE DISORDER (H): ICD-10-CM

## 2021-12-20 DIAGNOSIS — C85.90 LYMPHOMA, UNSPECIFIED BODY REGION, UNSPECIFIED LYMPHOMA TYPE (H): ICD-10-CM

## 2021-12-20 LAB
ALBUMIN SERPL-MCNC: 3.4 G/DL (ref 3.4–5)
ALP SERPL-CCNC: 95 U/L (ref 40–150)
ALT SERPL W P-5'-P-CCNC: 14 U/L (ref 0–70)
ANION GAP SERPL CALCULATED.3IONS-SCNC: 4 MMOL/L (ref 3–14)
AST SERPL W P-5'-P-CCNC: 25 U/L (ref 0–45)
BASOPHILS # BLD AUTO: 0 10E3/UL (ref 0–0.2)
BASOPHILS NFR BLD AUTO: 0 %
BILIRUB SERPL-MCNC: 0.5 MG/DL (ref 0.2–1.3)
BUN SERPL-MCNC: 32 MG/DL (ref 7–30)
CALCIUM SERPL-MCNC: 9.4 MG/DL (ref 8.5–10.1)
CHLORIDE BLD-SCNC: 99 MMOL/L (ref 94–109)
CO2 SERPL-SCNC: 33 MMOL/L (ref 20–32)
CREAT SERPL-MCNC: 0.91 MG/DL (ref 0.66–1.25)
CRP SERPL-MCNC: 3.1 MG/L (ref 0–8)
D DIMER PPP FEU-MCNC: 0.91 UG/ML FEU (ref 0–0.5)
EOSINOPHIL # BLD AUTO: 0.1 10E3/UL (ref 0–0.7)
EOSINOPHIL NFR BLD AUTO: 1 %
ERYTHROCYTE [DISTWIDTH] IN BLOOD BY AUTOMATED COUNT: 13.4 % (ref 10–15)
ERYTHROCYTE [SEDIMENTATION RATE] IN BLOOD BY WESTERGREN METHOD: 99 MM/HR (ref 0–20)
GFR SERPL CREATININE-BSD FRML MDRD: 83 ML/MIN/1.73M2
GLUCOSE BLD-MCNC: 126 MG/DL (ref 70–99)
HCT VFR BLD AUTO: 41.2 % (ref 40–53)
HGB BLD-MCNC: 14.2 G/DL (ref 13.3–17.7)
INR PPP: 2.6 (ref 0.85–1.15)
LYMPHOCYTES # BLD AUTO: 3.1 10E3/UL (ref 0.8–5.3)
LYMPHOCYTES NFR BLD AUTO: 31 %
MCH RBC QN AUTO: 33.7 PG (ref 26.5–33)
MCHC RBC AUTO-ENTMCNC: 34.5 G/DL (ref 31.5–36.5)
MCV RBC AUTO: 98 FL (ref 78–100)
MONOCYTES # BLD AUTO: 0.9 10E3/UL (ref 0–1.3)
MONOCYTES NFR BLD AUTO: 9 %
NEUTROPHILS # BLD AUTO: 5.9 10E3/UL (ref 1.6–8.3)
NEUTROPHILS NFR BLD AUTO: 59 %
PLATELET # BLD AUTO: 120 10E3/UL (ref 150–450)
POTASSIUM BLD-SCNC: 4.5 MMOL/L (ref 3.4–5.3)
PROT SERPL-MCNC: 7.8 G/DL (ref 6.8–8.8)
RBC # BLD AUTO: 4.21 10E6/UL (ref 4.4–5.9)
SODIUM SERPL-SCNC: 136 MMOL/L (ref 133–144)
WBC # BLD AUTO: 10.1 10E3/UL (ref 4–11)

## 2021-12-20 PROCEDURE — 86140 C-REACTIVE PROTEIN: CPT

## 2021-12-20 PROCEDURE — 85025 COMPLETE CBC W/AUTO DIFF WBC: CPT

## 2021-12-20 PROCEDURE — 85652 RBC SED RATE AUTOMATED: CPT

## 2021-12-20 PROCEDURE — 80053 COMPREHEN METABOLIC PANEL: CPT

## 2021-12-20 PROCEDURE — 85610 PROTHROMBIN TIME: CPT

## 2021-12-20 PROCEDURE — 36415 COLL VENOUS BLD VENIPUNCTURE: CPT

## 2021-12-20 PROCEDURE — 85379 FIBRIN DEGRADATION QUANT: CPT

## 2021-12-20 NOTE — TELEPHONE ENCOUNTER
Left message for pt stating received his [employer] message asking to release lab results from 12/13.  Author not seeing any lab results from 12/13, though look like he should have had labs drawn which were ordered by Dr. Degroot for Dr. Geovani Milelr.  Stated would try to call clinic before his lab appt today.  Spoke [facility], who stated he does not see that pt had an appt on 12/13 but does see appt for today.  Discussed pt needs lab orderss expected 12/14 (and ordered by Dr. Degroot for Dr. Miller) drawn today.  Also entered appt note adding Dr Degroot's name.    Spoke with pt this afternoon.  Shared with him that [facility] lab staff member, did not even see that pt had an appt for lab draw on 12/13.  Discussed pt to have labs drawn that were ordered by Dr. Degroot (for Dr. Geovani Miller) today.  Pt stated he was on his way to lab at this time.

## 2021-12-21 NOTE — RESULT ENCOUNTER NOTE
Please call patient:  Your D dimer test is abnormal. This was ordered at the time of your prior ADS visit. While high D dimer can indicate risk for blood clot, you are already taking a blood thinner and your INR is at goal. You already have plans for CT imaging of your chest, and I don't think this blood test result means you need testing sooner (today), unless you are having new chest symptoms such as chest pain or shortness of breath.   Anya Nowak MD

## 2021-12-23 ENCOUNTER — HOME INFUSION (PRE-WILLOW HOME INFUSION) (OUTPATIENT)
Dept: PHARMACY | Facility: CLINIC | Age: 71
End: 2021-12-23
Payer: COMMERCIAL

## 2021-12-24 ENCOUNTER — ANTICOAGULATION THERAPY VISIT (OUTPATIENT)
Dept: FAMILY MEDICINE | Facility: CLINIC | Age: 71
End: 2021-12-24
Payer: COMMERCIAL

## 2021-12-24 ENCOUNTER — TRANSFERRED RECORDS (OUTPATIENT)
Dept: HEALTH INFORMATION MANAGEMENT | Facility: CLINIC | Age: 71
End: 2021-12-24
Payer: COMMERCIAL

## 2021-12-24 DIAGNOSIS — G20.A1 PARKINSON DISEASE (H): ICD-10-CM

## 2021-12-24 DIAGNOSIS — Z79.01 LONG TERM CURRENT USE OF ANTICOAGULANT THERAPY: Primary | ICD-10-CM

## 2021-12-24 DIAGNOSIS — I48.91 ATRIAL FIBRILLATION (H): ICD-10-CM

## 2021-12-24 DIAGNOSIS — I48.0 PAROXYSMAL ATRIAL FIBRILLATION (H): ICD-10-CM

## 2021-12-24 LAB — INR (EXTERNAL): 2.1 (ref 2–3)

## 2021-12-24 RX ORDER — CARBIDOPA AND LEVODOPA 25; 100 MG/1; MG/1
TABLET ORAL
Qty: 90 TABLET | Refills: 11 | OUTPATIENT
Start: 2021-12-24

## 2021-12-24 NOTE — TELEPHONE ENCOUNTER
Haresh is no longer taking sinemet and is taking Parcopa ODT  mg tablets. No refill needed for his carbidopa/levodopa .

## 2021-12-24 NOTE — TELEPHONE ENCOUNTER
Rx Authorization:  Requested Medication/ Dose:Carbidopa/levodopa  tabs  Date last refill ordered: 12/13/21  Quantity ordered: 90 tabs  # refills: 11  Date of last clinic visit with ordering provider: 10/8/21  Date of next clinic visit with ordering provider: F/U 1 year  All pertinent protocol data (lab date/result):   Include pertinent information from patients message:

## 2021-12-24 NOTE — PROGRESS NOTES
ANTICOAGULATION MANAGEMENT     Haresh Rock 71 year old male is on warfarin with therapeutic INR result. (Goal INR 2.0-3.0)    Recent labs: (last 7 days)     12/24/21  0911   INR 2.1       ASSESSMENT     Source(s): Chart Review and Patient/Caregiver Call       Warfarin doses taken: Warfarin taken as instructed    Diet: No new diet changes identified    New illness, injury, or hospitalization: No    Medication/supplement changes: None noted    Signs or symptoms of bleeding or clotting: No    Previous INR: Therapeutic last visit; previously outside of goal range    Additional findings: None     PLAN     Recommended plan for no diet, medication or health factor changes affecting INR     Dosing Instructions: Continue your current warfarin dose with next INR in 1 week       Summary  As of 12/24/2021    Full warfarin instructions:  5 mg every Fri; 3.75 mg all other days   Next INR check:               Telephone call with Haresh who verbalizes understanding and agrees to plan and who agrees to plan and repeated back plan correctly    Patient to recheck with home meter    Education provided: Please call back if any changes to your diet, medications or how you've been taking warfarin    Plan made per [facility] anticoagulation protocol    Belinda Lafleur, RN  Anticoagulation Clinic  12/24/2021    _______________________________________________________________________     Anticoagulation Episode Summary     Current INR goal:  2.0-3.0   TTR:  59.7 % (11.7 mo)   Target end date:  Indefinite   Send INR reminders to:  CHELSEA CHILEL    Indications    Long-term (current) use of anticoagulants [Z79.01] [Z79.01]  Atrial fibrillation (H) [I48.91]  Antiphospholipid antibody syndrome (H) (Resolved) [D68.61]  Personal history of DVT (deep vein thrombosis) (Resolved) [Z86.718]  Atrial fibrillation  unspecified type (H) (Resolved) [I48.91]  Paroxysmal atrial fibrillation (H) [I48.0]           Comments:  JESSICA okay to manage by exception          Anticoagulation Care Providers     Provider Role Specialty Phone number    Anya Nowak MD Referring Family Medicine [phone]

## 2021-12-27 DIAGNOSIS — G20.A1 PARKINSON DISEASE (H): ICD-10-CM

## 2021-12-27 RX ORDER — CARBIDOPA AND LEVODOPA 25; 100 MG/1; MG/1
TABLET ORAL
Qty: 90 TABLET | Refills: 11 | Status: SHIPPED | OUTPATIENT
Start: 2021-12-27 | End: 2022-01-03

## 2021-12-27 NOTE — TELEPHONE ENCOUNTER
"[facility] Call Center    Phone Message    May a detailed message be left on voicemail: yes     Reason for Call: Medication Refill Request    Has the patient contacted the pharmacy for the refill? Yes   Name of medication being requested: carbidopa levodopa  [facility] pharmacy requesting that the ODT be canceled for further fills as its too expensive, per patient. Would like the \"regular\" carbidopa levodopa. Please send fill request to pharmacy.     Action Taken: Other: [facility] NEUROLOGY    Travel Screening: Not Applicable                                                                      "

## 2021-12-29 ENCOUNTER — HOSPITAL ENCOUNTER (OUTPATIENT)
Dept: SPEECH THERAPY | Facility: CLINIC | Age: 71
Setting detail: THERAPIES SERIES
End: 2021-12-29
Attending: FAMILY MEDICINE
Payer: COMMERCIAL

## 2021-12-29 DIAGNOSIS — Z93.1 GASTROSTOMY TUBE IN PLACE (H): ICD-10-CM

## 2021-12-29 DIAGNOSIS — R13.12 OROPHARYNGEAL DYSPHAGIA: ICD-10-CM

## 2021-12-29 PROCEDURE — 92610 EVALUATE SWALLOWING FUNCTION: CPT | Mod: GN,GT,95 | Performed by: SPEECH-LANGUAGE PATHOLOGIST

## 2021-12-29 NOTE — PROGRESS NOTES
12/29/21 1130       Present No   General Information   Type Of Visit Initial   Start Of Care Date 12/29/21   Referring Physician Anya Nowak MD   Orders Evaluate And Treat   Orders Comment Oropharyngeal    Medical Diagnosis Dysphagia, PD    Onset Of Illness/injury Or Date Of Surgery 12/01/21   Precautions/limitations Swallowing Precautions   Hearing WFL for conversational speech production    Pertinent History of Current Problem/OT: Additional Occupational Profile Info Haresh Rock is a 71-year-old male with a PMH significant for PD, CKD, DVT, A. fib ablation, antiphospholipid antibody syndrome, Hodgkin's lymphoma , and dysphagia with a recent admission for aspiration PNA.  Pt is known to this provider and the OP SLP caseload, please see dysphagia hx for further information.  Pt had had multiple VFSS procedures with evidence of aspiration and an admission for PNA x1.  Pt is now s/p PEG placement which he reports to be going well.  Pt notes that he is using the PEG 3x/day for intake and has gained 10 lbs.  Pt also notes that he has more energy.  Pt reports little interest in food but reports that he does eat small amounts for 'treats' and is taking his AM medications orally with Code Red Mountain Dew.  Pt denies swallowing a difficult at this time but notes that his biggest challenge relates to his voice.  Pt reported that he completes the LSVT Loud and EMST exercises rarely but did just review previously obtained information.  Per most recent VFSS completed 3/23/21, pt demonstrated silent aspiration during and after the swallow with thin and nectar thick liquids. A supraglottic swallow strategy was not    Respiratory Status Room air   Prior Level Of Function Swallowing   Prior Level Of Function Comment NPO- soft solids and thins for comfort/pleasure   Patient Role/employment History Retired   Living Environment [place]   General Observations Pt is pleasant and  cooperative.    Patient/family Goals Goals not formally stated at the time of evaluation.    Clinical Swallow Evaluation   Oral Musculature generally intact   Structural Abnormalities none present   Dentition present and adequate   Secretion Management problems swallowing secretions  (Frequent coughing and throat clearing )   Oral Labial Strength and Mobility WFL;other (see comments)  (Slowed )   Lingual Strength and Mobility WFL;other (see comments);impaired anterior elevation  (movement slowed)   Buccal Strength and Mobility intact   Laryngeal Function Cough;Throat clear;Swallow;Voicing initiated  (Cough and throat clearing non-productive )   Oral Musculature Comments Cul de sac resonance    Additional Documentation Yes   Additional evaluation(s) completed today No   Clinical Swallow Eval: Thin Liquid Texture Trial   Diagnostic Statement No PO served.  Pharyngeal phase impairment previously identified on VFSS with silent aspiration.  Concern for pharyngeal phase remains due to diagnosis of degenerative disease as well as pt actively coughing/throat clearing on secretions.     Swallow Compensations   Swallow Compensations Pacing;Reduce amounts;Alternate viscosity of consistencies   Results Aspiration   Educational Assessment   Barriers to Learning Cognitive   Esophageal Phase of Swallow   Patient reports or presents with symptoms of esophageal dysphagia No   Swallow Eval: Clinical Impressions   Skilled Criteria for Therapy Intervention No significant expected  improvement in functional status   Functional Assessment Scale (FAS) 1   Dysphagia Outcome Severity Scale (KATHRYN) Level 1 - KATHRYN   Treatment Diagnosis Severe oropharyngeal dysphagia    Diet texture recommendations   (PEG with PO for comfort )   Recommended Feeding/Eating Techniques alternate between small bites and sips of food/liquid;maintain upright posture during/after eating for 30 mins;small sips/bites;other (see comments)   Rehab Potential fair, will  monitor progress closely   Anticipated Discharge Disposition home   Risks and Benefits of Treatment have been explained. Yes   Patient, family and/or staff in agreement with Plan of Care Yes   Clinical Impression Comments Pt presents with severe oropharyngeal dysphagia in the setting of PD.  Pt is now PEG dependent with small amounts of PO for pleasure.  Oral mechanism exam noted for slowed movements, cul-de-sac resonance, reduced vocal volume, and reduced anterior elevation.  No PO served due to increased risk for aspiration given prior evidence of silent aspiration.  Concern for pharyngeal phase remains increased in the setting of his diagnosis of PD and current evidence of pt actively coughing/throat clearing on secretions.  At this time, continue NPO with PEG as primary source of nutrition and hydration with minimal amounts of PO for comfort/pleasure.  Pt should continue with frequent oral cares and resume previously taught LSVT Loud and EMST exercise.  Pt retrained on these exercises and rationale.  Pt reported that he would like to initiate these independently but would like to f/u with SLP in 2-4 weeks' time to ensure accuracy with completion.  Pt to be scheduled accordingly.     Swallow Goal 1   Goal Identifier 1   Goal Description Pt will complete LSVT exercises for speech and swallowing and use EMST independently as recommended by the treating therapists.     Date Met 03/29/22   Swallow Goal 2   Goal Identifier 2   Goal Description Pt will indpendently complete oral cares 3x/day and demonstrate understanding of rationale for completion as determined by the treating therapist.     Date Met 03/29/22   Total Session Time   SLP Eval: oral/pharyngeal swallow function, clinical minutes [phone]     Thank you for the referral of Haresh Rock.  If you have any questions about this report, please contact me using the information below.         Linda Anthony  MS CCC-SLP    Speech Language Pathologist    Clinical Specialist Level 1   Rehabilitation Services  [facility]   [address]  [address] [place] [address]  Office: [phone]  [email or web address]

## 2022-01-02 ENCOUNTER — TRANSFERRED RECORDS (OUTPATIENT)
Dept: HEALTH INFORMATION MANAGEMENT | Facility: CLINIC | Age: 72
End: 2022-01-02
Payer: COMMERCIAL

## 2022-01-02 LAB — INR (EXTERNAL): 2.6 (ref 0.9–1.1)

## 2022-01-03 ENCOUNTER — VIRTUAL VISIT (OUTPATIENT)
Dept: PHARMACY | Facility: CLINIC | Age: 72
End: 2022-01-03
Payer: COMMERCIAL

## 2022-01-03 ENCOUNTER — ANTICOAGULATION THERAPY VISIT (OUTPATIENT)
Dept: FAMILY MEDICINE | Facility: CLINIC | Age: 72
End: 2022-01-03
Payer: COMMERCIAL

## 2022-01-03 DIAGNOSIS — I48.0 PAROXYSMAL ATRIAL FIBRILLATION (H): ICD-10-CM

## 2022-01-03 DIAGNOSIS — Z79.01 LONG TERM CURRENT USE OF ANTICOAGULANT THERAPY: Primary | ICD-10-CM

## 2022-01-03 DIAGNOSIS — I48.91 ATRIAL FIBRILLATION (H): ICD-10-CM

## 2022-01-03 DIAGNOSIS — G20.A1 PARKINSON DISEASE (H): Primary | ICD-10-CM

## 2022-01-03 PROCEDURE — 99607 MTMS BY PHARM ADDL 15 MIN: CPT | Performed by: PHARMACIST

## 2022-01-03 PROCEDURE — 99605 MTMS BY PHARM NP 15 MIN: CPT | Performed by: PHARMACIST

## 2022-01-03 RX ORDER — CARBIDOPA AND LEVODOPA 25; 100 MG/1; MG/1
1 TABLET ORAL 3 TIMES DAILY
Qty: 270 TABLET | Refills: 3 | Status: SHIPPED | OUTPATIENT
Start: 2022-01-03 | End: 2022-03-21

## 2022-01-03 NOTE — PROGRESS NOTES
ANTICOAGULATION  MANAGEMENT-Home Monitor Managed by Exception    Haresh Rock 71 year old male is on warfarin with therapeutic INR result. (Goal INR 2.0-3.0)    Recent labs: (last 7 days)     01/02/22  0900   INR 2.6*         Previous INR was Therapeutic    Medication, diet, health changes since last INR:chart reviewed; none identified    Contacted within the last 12 weeks by phone on 12-24-21      MINNIE Hutson was NOT contacted regarding therapeutic result today per home monitoring policy manage by exception agreement.   Current warfarin dose is to be continued:     Summary  As of 1/3/2022    Full warfarin instructions:  5 mg every Fri; 3.75 mg all other days   Next INR check:  1/17/2022           ?   Macrina Preston RN  Anticoagulation Clinic  1/3/2022    _______________________________________________________________________     Anticoagulation Episode Summary     Current INR goal:  2.0-3.0   TTR:  61.5 % (11.7 mo)   Target end date:  Indefinite   Send INR reminders to:  CHELSEA CHILEL    Indications    Long-term (current) use of anticoagulants [Z79.01] [Z79.01]  Atrial fibrillation (H) [I48.91]  Antiphospholipid antibody syndrome (H) (Resolved) [D68.61]  Personal history of DVT (deep vein thrombosis) (Resolved) [Z86.718]  Atrial fibrillation  unspecified type (H) (Resolved) [I48.91]  Paroxysmal atrial fibrillation (H) [I48.0]           Comments:  JESSICA rodas to manage by exception         Anticoagulation Care Providers     Provider Role Specialty Phone number    Anya Nowak MD Referring Family Medicine [phone]          Macrina Avila RN, BSN, PHN  Anticoagulation Nurse  [phone]

## 2022-01-03 NOTE — LETTER
January 6, 2022  Haresh EMILIE Rock  [address] [place] [address]    Dear Mr. Rock, [facility]        Thank you for talking with me on Uriel 3, 2022 about your health and medications. As a follow-up to our conversation, I have included two documents:      1. Your Recommended To-Do List has steps you should take to get the best results from your medications.  2. Your Medication List will help you keep track of your medications and how to take them.    If you want to talk about these documents, please call Leslie Clifford RPH at phone: [phone], Monday-Friday 8-4:30pm.    I look forward to working with you and your doctors to make sure your medications work well for you.    Sincerely,    Leslie Clifford RPH

## 2022-01-03 NOTE — LETTER
"Recommended To-Do List      Prepared on: 1/6/2022     You can get the best results from your medications by completing the items on this \"To-Do List.\"      Bring your To-Do List when you go to your doctor. And, share it with your family or caregivers.    My To-Do List:  What we talked about: What I should do:   What my medicines are for, how to know if my medicines are working, made sure my medicines are safe for me and reviewed how to take my medicines.   Take my medicines every day              "

## 2022-01-03 NOTE — PROGRESS NOTES
Medication Therapy Management (MTM) Encounter    ASSESSMENT:                            Medication Adherence/Access: No issues identified    Parkinson's Disease: Improving.     Afib/Hypertension: appears stable     Hyperlipidemia: stable    GERD: stable     Allergic Rhinitis: stable     Vitamins/supplements: stable     PLAN:                            1. Refilled carbidopa-levodopa  mg tablets for 90 day supply    2. You should plan to follow up with Dr. Hill within 3 months     Follow-up: 1 year or sooner if needed    SUBJECTIVE/OBJECTIVE:                          Haresh Rock is a 71 year old male called for a follow-up visit. He was referred to me from Dr. Hill.  Today's visit is a follow-up MTM visit from 12/13/21     Reason for visit: follow up on medications.    Allergies/ADRs: Reviewed in chart  Past Medical History: Reviewed in chart  Tobacco: He reports that he has never smoked. He has never used smokeless tobacco.  Alcohol: none    Medication Adherence/Access: no issues reported  ? 8 am - swallow morning pills by mouth including flecanide (AM dose only), Sinemet (AM dose only), Vitamin D, omeprazole, and metoprolol capsule.  ? 9-11 am feeding  ? 1-2 pm - crushed Sinemet dose via G-tube  ? 3-5 pm feeding  ? 7 pm evening pills - crushes warfarin, flecainide, rosuvastatin and Sinemet then mixes all together with 120 mL water in syringe and administers through g-tube. Also takes extra 120 mL water by g-tube after taking pills.  ? 8-10 pm feeding    Parkinson's Disease:  Current medications include: Carbidopa-levodopa  mg 1 tablet 3 times/day (7 am, 2 pm, and 7 pm). He hasn't noticed much change in symptoms other than some improvement in voice and gut motility. He is able to have a bowel movement every 1-2 days now, which is an improvement. He is getting a little dizzy when getting up from chairs so he has been careful of this. Still has a little tremor but not too bothersome. He is working with a  speech therapist. The dissolvable carbidopa-levodopa was 3x as expensive as the regular carbidopa-levodopa so he is going to go back to the tablets that he swallows as he isn't having trouble with this.     Afib/Hypertension: Patient is currently taking warfarin for anticoagulation. Patient reports no current concerns of bruising or bleeding. Patient does not have a history of GI bleed.   Patient is also taking flecainide 50 mg twice daily, metoprolol ER 25 mg every morning. Patient reports no current medication side effects.     BP Readings from Last 3 Encounters:   01/05/22 110/67   12/07/21 116/68   12/01/21 116/73     Hyperlipidemia: Current therapy includes rosuvastatin 40mg daily.  Patient reports no significant myalgias or other side effects.  The ASCVD Risk score (Merariilana MENENDEZ Jr., et al., 2013) failed to calculate for the following reasons:    The valid total cholesterol range is 130 to 320 mg/dL  Recent Labs   Lab Test 08/20/21  0752 01/15/21  0817 04/18/16  1659 11/16/15  1155 07/09/14  1511   CHOL 102 110   < > 126 168   HDL 40 36*   < > 31* 29*   LDL 50 55   < > 71 104   TRIG 61 96   < > 119 174*   CHOLHDLRATIO  --   --   --  4.1 5.8*    < > = values in this interval not displayed.     GERD: Current medications include: Prilosec (omeprazole) 20 mg once daily. No issues reported today.     Allergic Rhinitis: Current medications include loratadine 10 mg and flonase as needed. No issues reported today.    Vitamins/supplements: taking MVI and vitamin D daily    Today's Vitals: There were no vitals taken for this visit.  ----------------    I spent 18 minutes with this patient today. All changes were made via collaborative practice agreement with Dr. Hill. A copy of the visit note was provided to the patient's referring provider.    The patient was sent via [employer] a summary of these recommendations.     Leslie Clifford, Pharm.D.  Medication Therapy Management Pharmacist  [facility]  Neurology    Telemedicine Visit Details  Type of service:  Telephone visit  Start Time: 10:01 AM  End Time: 10:19 AM  Originating Location (patient location): Home  Distant Location (provider location):  [facility]     Medication Therapy Recommendations  No medication therapy recommendations to display

## 2022-01-03 NOTE — PATIENT INSTRUCTIONS
Recommendations from today's MTM visit:                                                      1. Refilled carbidopa-levodopa  mg tablets for 90 day supply    2. You should plan to follow up with Dr. Hill within 3 months     Follow-up: 1 year or sooner if needed    It was great to speak with you today.  I value your experience and would be very thankful for your time with providing feedback on our clinic survey. You may receive a survey via email or text message in the next few days.     To schedule another MTM appointment, please call the clinic directly or you may call the MTM scheduling line at [phone] or toll-free at [phone].     My Clinical Pharmacist's contact information:                                                      Please feel free to contact me with any questions or concerns you have.      Leslie Clifford, Pharm.D.  Medication Therapy Management Pharmacist  [facility]

## 2022-01-03 NOTE — LETTER
_  Medication List        Prepared on: 1/6/2022     Bring your Medication List when you go to the doctor, hospital, or   emergency room. And, share it with your family or caregivers.     Note any changes to how you take your medications.  Cross out medications when you no longer use them.    Medication How I take it Why I use it Prescriber   acetaminophen (TYLENOL) 500 MG tablet Take 2 tablets by mouth 2 times daily as needed  Pain  Self    carbidopa-levodopa (SINEMET)  MG tablet Take 1 tablet by mouth 3 times daily Parkinson Disease  Ángel Hill MD   flecainide (TAMBOCOR) 50 MG tablet Take 1 tablet by mouth twice daily Atrial Fibrillation   Bekah Matt MD   fluticasone (FLONASE) 50 MCG/ACT nasal spray Spray 1 spray into both nostrils daily as needed for rhinitis Allergies  Anya Nowak MD   loratadine (CLARITIN REDITABS) 10 MG ODT Take 1 tablet by mouth daily  Allergies  Self    Metoprolol Succinate (KAPSPARGO) 25 MG CS24 Take 1 tablet by mouth daily Atrial Fibrillation Anya Nowak MD   Multiple Vitamins-Minerals (MULTIVITAMIN GUMMIES ADULT PO) Take 1 each by mouth daily General health Self    omeprazole (PRILOSEC) 20 MG DR capsule Take 1 capsule (20 mg) by mouth every morning Heartburn  Anya Nowak MD   PREVIDENT 5000 DRY MOUTH 1.1 % GEL topical gel Apply to affected area daily  Dental health Self    rosuvastatin (CRESTOR) 40 MG tablet Take 1 tablet (40 mg) by mouth At Bedtime High cholesterol  Anya Nowak MD   vitamin D3 (CHOLECALCIFEROL) 50 mcg (2000 units) tablet Take 1 tablet (50 mcg) by mouth daily Low vitamin D Anya Nowak MD   warfarin ANTICOAGULANT (COUMADIN) 2.5 MG tablet Take 5 mg by mouth on Fridays and 3.75 mg all other days  Atrial fibrillation  Javi Leon MD         Add new medications, over-the-counter drugs, herbals, vitamins, or  minerals in the blank rows below.    Medication How I take it Why I use it Prescriber                           Allergies:      seasonal allergies        Side effects I have had:              Other Information:             My notes and questions:

## 2022-01-03 NOTE — LETTER
January 6, 2022  Haresh EMILIE Rock  [address] [place] [address]    Dear Mr. Rock, [facility]        Thank you for talking with me on Uriel 3, 2022 about your health and medications. As a follow-up to our conversation, I have included two documents:      1. Your Recommended To-Do List has steps you should take to get the best results from your medications.  2. Your Medication List will help you keep track of your medications and how to take them.    If you want to talk about these documents, please call Leslie Clifford RPH at phone: [phone], Monday-Friday 8-4:30pm.    I look forward to working with you and your doctors to make sure your medications work well for you.    Sincerely,    Leslie Clifford RPH

## 2022-01-05 ENCOUNTER — OFFICE VISIT (OUTPATIENT)
Dept: FAMILY MEDICINE | Facility: CLINIC | Age: 72
End: 2022-01-05
Payer: COMMERCIAL

## 2022-01-05 VITALS
BODY MASS INDEX: 20.58 KG/M2 | HEIGHT: 71 IN | SYSTOLIC BLOOD PRESSURE: 110 MMHG | DIASTOLIC BLOOD PRESSURE: 67 MMHG | HEART RATE: 73 BPM | TEMPERATURE: 98.1 F | WEIGHT: 147 LBS | OXYGEN SATURATION: 98 %

## 2022-01-05 DIAGNOSIS — E83.110 HEREDITARY HEMOCHROMATOSIS (H): ICD-10-CM

## 2022-01-05 DIAGNOSIS — G20.A1 PARKINSON'S DISEASE (H): ICD-10-CM

## 2022-01-05 DIAGNOSIS — N18.2 TYPE 2 DIABETES MELLITUS WITH STAGE 2 CHRONIC KIDNEY DISEASE, WITHOUT LONG-TERM CURRENT USE OF INSULIN (H): ICD-10-CM

## 2022-01-05 DIAGNOSIS — D89.813 GRAFT-VERSUS-HOST DISEASE, UNSPECIFIED (H): ICD-10-CM

## 2022-01-05 DIAGNOSIS — Z94.84 STEM CELLS TRANSPLANT STATUS (H): ICD-10-CM

## 2022-01-05 DIAGNOSIS — K74.60 CIRRHOSIS OF LIVER NOT DUE TO ALCOHOL (H): ICD-10-CM

## 2022-01-05 DIAGNOSIS — E43 SEVERE PROTEIN-CALORIE MALNUTRITION (H): ICD-10-CM

## 2022-01-05 DIAGNOSIS — I42.8 NONISCHEMIC CARDIOMYOPATHY (H): ICD-10-CM

## 2022-01-05 DIAGNOSIS — Z93.1 GASTROSTOMY TUBE IN PLACE (H): Primary | ICD-10-CM

## 2022-01-05 DIAGNOSIS — D69.6 THROMBOCYTOPENIA (H): ICD-10-CM

## 2022-01-05 DIAGNOSIS — E11.22 TYPE 2 DIABETES MELLITUS WITH STAGE 2 CHRONIC KIDNEY DISEASE, WITHOUT LONG-TERM CURRENT USE OF INSULIN (H): ICD-10-CM

## 2022-01-05 PROBLEM — C85.90 LYMPHOMA, UNSPECIFIED BODY REGION, UNSPECIFIED LYMPHOMA TYPE (H): Status: ACTIVE | Noted: 2022-01-05

## 2022-01-05 PROBLEM — L89.90 PRESSURE ULCER: Status: RESOLVED | Noted: 2021-11-09 | Resolved: 2022-01-05

## 2022-01-05 PROBLEM — C85.90 LYMPHOMA, UNSPECIFIED BODY REGION, UNSPECIFIED LYMPHOMA TYPE (H): Status: RESOLVED | Noted: 2022-01-05 | Resolved: 2022-01-05

## 2022-01-05 PROBLEM — D46.9 MYELODYSPLASTIC SYNDROME, UNSPECIFIED (H): Status: ACTIVE | Noted: 2022-01-05

## 2022-01-05 PROBLEM — D46.9 MYELODYSPLASTIC SYNDROME, UNSPECIFIED (H): Status: RESOLVED | Noted: 2022-01-05 | Resolved: 2022-01-05

## 2022-01-05 PROCEDURE — 99214 OFFICE O/P EST MOD 30 MIN: CPT | Performed by: FAMILY MEDICINE

## 2022-01-05 ASSESSMENT — MIFFLIN-ST. JEOR: SCORE: 1425.98

## 2022-01-05 NOTE — Clinical Note
Dr. Finney, Thank you so much for taking care of Haresh. He is doing much better. I am uncertain of the long term plan for this G tube - Does he need a more permanent device? Who/where should this be done? Thanks, Anya Nowak -[phone]

## 2022-01-05 NOTE — PROGRESS NOTES
"  Assessment & Plan     Gastrostomy tube in place (H)  Severe protein-calorie malnutrition (H)  Parkinson's disease (H)  -doing much better, with stability of weight and toleration of tube feeds   -he is generally avoiding oral intake, but enjoyed holiday meals with his family   -will need to determine long term plan for G tube     Type 2 diabetes mellitus with stage 2 chronic kidney disease, without long-term current use of insulin (H)  -diet resolved/essentially resolved   - OPTOMETRY REFERRAL; Future    Stem cells transplant status (H)  Wujxk-xtfkwn-gosx disease, unspecified (H)  Thrombocytopenia (H)  Hereditary hemochromatosis (H)  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    Anya Nowak MD  [facility] DOUG Hutson is a 71 year old who presents for the following health issues     HPI     Haresh Rock is a 71 year old male who presents with malnutrition due to dysphagia related to Parkinson's disease. He is tolerating his G tube well, with stability of weight for weeks. Severity is described as mild. Course of his symptoms over time is improved.     Review of Systems   CONSTITUTIONAL: NEGATIVE for fever, chills, change in weight  INTEGUMENTARY/SKIN: NEGATIVE for worrisome rashes, moles or lesions  ENT/MOUTH: dysphagia, hoarse/soft voice   RESP: NEGATIVE for significant cough or SOB  GI: NEGATIVE for nausea, abdominal pain, heartburn, or change in bowel habits   male: BPH   NEURO: Parkinson's   PSYCHIATRIC: NEGATIVE for changes in mood or affect      Objective    /67 (BP Location: Left arm, Patient Position: Sitting, Cuff Size: Adult Regular)   Pulse 73   Temp 98.1  F (36.7  C) (Oral)   Ht 1.791 m (5' 10.5\")   Wt 66.7 kg (147 lb)   SpO2 98%   BMI 20.79 kg/m    Body " mass index is 20.79 kg/m .  Physical Exam   GENERAL: alert, no distress, frail and elderly  NECK: no adenopathy, no asymmetry, masses, or scars and thyroid normal to palpation  RESP: lungs clear to auscultation - no rales, rhonchi or wheezes  CV: regular rate and rhythm, normal S1 S2, no S3 or S4, no murmur, click or rub, no peripheral edema   ABDOMEN: soft, nontender and G tube in place   MS: no gross musculoskeletal defects noted, no edema  NEURO: flat faces; normal strength and tone and mentation intact  PSYCH: mentation appears normal, affect normal/bright    Anticoagulation Therapy Visit on 01/03/2022   Component Date Value Ref Range Status     INR (External) 01/02/2022 2.6* 0.9 - 1.1 Final

## 2022-01-05 NOTE — PATIENT INSTRUCTIONS
Please schedule your yearly eye examination.    Get the shingles vaccine, called Shingrix (given as 2 shots, 2 to 6 months apart), even if you have already had the Zostavax vaccine. Discuss getting the Shingix vaccine from your pharmacist, or schedule an ancillary shot visit here. Some insurances do not cover the cost of these vaccines.

## 2022-01-06 ENCOUNTER — TELEPHONE (OUTPATIENT)
Dept: FAMILY MEDICINE | Facility: CLINIC | Age: 72
End: 2022-01-06
Payer: COMMERCIAL

## 2022-01-06 DIAGNOSIS — I48.0 PAROXYSMAL ATRIAL FIBRILLATION (H): ICD-10-CM

## 2022-01-06 DIAGNOSIS — I47.10 PAROXYSMAL SUPRAVENTRICULAR TACHYCARDIA (H): ICD-10-CM

## 2022-01-06 RX ORDER — FLECAINIDE ACETATE 50 MG/1
TABLET ORAL
Qty: 180 TABLET | Refills: 0 | Status: SHIPPED | OUTPATIENT
Start: 2022-01-06 | End: 2022-04-04

## 2022-01-06 NOTE — TELEPHONE ENCOUNTER
Please call patient: I have discussed your G tube with your surgeon. Your G tube only needs to be replaced if it is malfunctioning or the balloon stops holding fluid. (Its a standard ballooned gastrostomy tube that can be deflated and pulled out in an office setting if no longer needed.)    Anya Nowak MD

## 2022-01-06 NOTE — TELEPHONE ENCOUNTER
Patient notified of provider message. No further questions.     Karin COLON RN  [facility]

## 2022-01-07 ENCOUNTER — ANTICOAGULATION THERAPY VISIT (OUTPATIENT)
Dept: FAMILY MEDICINE | Facility: CLINIC | Age: 72
End: 2022-01-07
Payer: COMMERCIAL

## 2022-01-07 ENCOUNTER — TRANSFERRED RECORDS (OUTPATIENT)
Dept: HEALTH INFORMATION MANAGEMENT | Facility: CLINIC | Age: 72
End: 2022-01-07
Payer: COMMERCIAL

## 2022-01-07 DIAGNOSIS — I48.91 ATRIAL FIBRILLATION (H): ICD-10-CM

## 2022-01-07 DIAGNOSIS — Z79.01 LONG TERM CURRENT USE OF ANTICOAGULANT THERAPY: Primary | ICD-10-CM

## 2022-01-07 DIAGNOSIS — I48.0 PAROXYSMAL ATRIAL FIBRILLATION (H): ICD-10-CM

## 2022-01-07 LAB — INR (EXTERNAL): 2.5 (ref 0.9–1.1)

## 2022-01-07 NOTE — PROGRESS NOTES
ANTICOAGULATION  MANAGEMENT-Home Monitor Managed by Exception    Haresh Rock 71 year old male is on warfarin with therapeutic INR result. (Goal INR 2.0-3.0)    Recent labs: (last 7 days)     01/07/22  1345   INR 2.5*         Previous INR was Therapeutic    Medication, diet, health changes since last INR:chart reviewed; none identified    Contacted within the last 12 weeks by phone on 12/24/21      MINNIE Hutson was NOT contacted regarding therapeutic result today per home monitoring policy manage by exception agreement.   Current warfarin dose is to be continued:     Summary  As of 1/7/2022    Full warfarin instructions:  5 mg every Fri; 3.75 mg all other days   Next INR check:  1/14/2022           ?   Macrina Preston RN  Anticoagulation Clinic  1/7/2022    _______________________________________________________________________     Anticoagulation Episode Summary     Current INR goal:  2.0-3.0   TTR:  61.6 % (11.7 mo)   Target end date:  Indefinite   Send INR reminders to:  CHELSEA CHILEL    Indications    Long-term (current) use of anticoagulants [Z79.01] [Z79.01]  Atrial fibrillation (H) [I48.91]  Antiphospholipid antibody syndrome (H) (Resolved) [D68.61]  Personal history of DVT (deep vein thrombosis) (Resolved) [Z86.718]  Atrial fibrillation  unspecified type (H) (Resolved) [I48.91]  Paroxysmal atrial fibrillation (H) [I48.0]           Comments:  JESSICA rodas to manage by exception         Anticoagulation Care Providers     Provider Role Specialty Phone number    Anya Nowak MD Referring Family Medicine [phone]          Macrina Avila RN, BSN, PHN  Anticoagulation Nurse  [phone]

## 2022-01-11 ENCOUNTER — PATIENT OUTREACH (OUTPATIENT)
Dept: CARE COORDINATION | Facility: CLINIC | Age: 72
End: 2022-01-11
Payer: COMMERCIAL

## 2022-01-11 ASSESSMENT — ACTIVITIES OF DAILY LIVING (ADL): DEPENDENT_IADLS:: INDEPENDENT

## 2022-01-11 NOTE — PROGRESS NOTES
Clinic Care Coordination Contact    Follow Up Progress Note      Assessment: The nurse care coordinator contacted the patient by phone for a follow-up call.  The patient stated that he is doing very well and is out shoveling snow without his walker.  Currently he uses the walker at night when he gets up to go to the bathroom.  The patient has been into see is primary care provider and they have discussed the care gaps.  The patient is aware of the identities that he needs to complete on his care gaps list.    Medication review was completed with the patient and marked as reviewed.  Health maintenance was reviewed and questions were answered with the patient.  The assessment for CKD was completed with the patient today as well as the social determinants of health and they were marked as reviewed.      Care Gaps:    Health Maintenance Due   Topic Date Due     ZOSTER IMMUNIZATION (2 of 3) 11/14/2016     DIABETIC FOOT EXAM  04/23/2020     EYE EXAM  01/01/2022       Care Gaps Last addressed on 1/11/2022, Care Gap Goal set: Yes and Patient accepted scheduling phone number for [phone]  to schedule independently     Goals addressed this encounter:   Goals Addressed                    This Visit's Progress       #1  Other - Care Gaps (pt-stated)   30%      Goal Statement: I will work with my PCP to close my care gaps by scheduling an eye exam, and scheduling a diabetic foot exam.  Date Goal set: 12/13/21  Barriers: The patient suffers from Parkinson.  Strengths: engaged in care coordination  Date to Achieve By: 6/13/22  Patient expressed understanding of goal: yes  Action steps to achieve this goal:  1. I will speak with my PCP about scheduling a diabetic foot exam.  2. I will make an appointment for an annual eye exam.  3. I will ask my PCP about which immunization are appropriate for me.           #2  Functional (pt-stated)   30%      Goal Statement: I will work on walking without the walker, and increasing my  strength and stamina.  Date Goal set: 12/13/21  Barriers: suffers from parkinson's  Strengths: engaged in care coordination  Date to Achieve By: 6/13/22  Patient expressed understanding of goal: yes  Action steps to achieve this goal:  1. I will practice in the house walking without the walker.  2. I will go outside without the walker when I feel strong enough and have increased my strength and stamina.  3. I will frequently walk around inside the house to increase my strength and stamina.              Intervention/Education provided during outreach: The patient will continue to work on increasing his stamina by walking around the house.     Outreach Frequency: monthly    Plan:   1.  The patient will continue to walk around the house without his walker if he is strong enough or with the walker to prevent falls.  2.  The patient will be outside shoveling or walking up and down the driveway whenever he feels comfortable.  3.  The patient will work with his primary care provider in order to close the care gaps that he has remaining.    RN CC Nurse Care Coordinator will follow up in 4 weeks.        Uriel Zhou MSN, RN, PHN, CCM   Primary Care Clinical RN Care Coordinator  [facility]  1/11/2022   3:20 PM  [email or web address]  Office: [phone]

## 2022-01-12 ENCOUNTER — OFFICE VISIT (OUTPATIENT)
Dept: PULMONOLOGY | Facility: CLINIC | Age: 72
End: 2022-01-12
Attending: INTERNAL MEDICINE
Payer: COMMERCIAL

## 2022-01-12 ENCOUNTER — ANCILLARY PROCEDURE (OUTPATIENT)
Dept: CT IMAGING | Facility: CLINIC | Age: 72
End: 2022-01-12
Attending: INTERNAL MEDICINE
Payer: COMMERCIAL

## 2022-01-12 VITALS
WEIGHT: 149 LBS | BODY MASS INDEX: 21.08 KG/M2 | OXYGEN SATURATION: 98 % | HEART RATE: 77 BPM | DIASTOLIC BLOOD PRESSURE: 73 MMHG | SYSTOLIC BLOOD PRESSURE: 124 MMHG

## 2022-01-12 DIAGNOSIS — R93.89 ABNORMAL CT OF THE CHEST: Primary | ICD-10-CM

## 2022-01-12 DIAGNOSIS — R91.8 GROUND GLASS OPACITY PRESENT ON IMAGING OF LUNG: ICD-10-CM

## 2022-01-12 PROCEDURE — G0463 HOSPITAL OUTPT CLINIC VISIT: HCPCS

## 2022-01-12 PROCEDURE — 71250 CT THORAX DX C-: CPT | Performed by: RADIOLOGY

## 2022-01-12 PROCEDURE — 99214 OFFICE O/P EST MOD 30 MIN: CPT | Performed by: INTERNAL MEDICINE

## 2022-01-12 ASSESSMENT — PAIN SCALES - GENERAL: PAINLEVEL: NO PAIN (0)

## 2022-01-12 NOTE — NURSING NOTE
"Oncology Rooming Note    January 12, 2022 10:55 AM   Haresh Rock is a 71 year old male who presents for:    Chief Complaint   Patient presents with     Oncology Clinic Visit     history of hodgkins lymphoma     Initial Vitals: /73   Pulse 77   Wt 67.6 kg (149 lb)   SpO2 98%   BMI 21.08 kg/m   Estimated body mass index is 21.08 kg/m  as calculated from the following:    Height as of 1/5/22: 1.791 m (5' 10.5\").    Weight as of this encounter: 67.6 kg (149 lb). Body surface area is 1.83 meters squared.  No Pain (0) Comment: Data Unavailable   No LMP for male patient.  Allergies reviewed: Yes  Medications reviewed: Yes    Medications: Medication refills not needed today.  Pharmacy name entered into [facility]: [id number] - [place], [place] - [address]    Clinical concerns: none       Pily Hubbard CMA            "

## 2022-01-12 NOTE — LETTER
1/12/2022       RE: Haresh Rock  [address] [place] [address]     Dear Colleague,    Thank you for referring your patient, Haresh Rock, to the [facility] at [facility]. Please see a copy of my visit note below.    [facility]  Interventional Pulmonary Clinic Virtual Visit Note    January 12, 2022    Chief complaint:  Haresh Rock is a 71 year old male seen for   Chief Complaint   Patient presents with     Oncology Clinic Visit     history of hodgkins lymphoma       Reason for clinic visit / Chief complaint:   Abnormal CT chest     Assessment and Plan:  #1  Bibasilar groundglass opacifications on recent CT scan which I reviewed likely representing inflammation due to ongoing aspiration.    In my opinion, his inflammatory changes in the lung are unlikely to be because of high ESR and CRP (now normalized). Current CT from today which I reviewed and my interpretation is that inflammatory changes has resolved. Has pleural based scarring in 2 areas on the right side which are also stable at least since 2017 CT scans to my review.     #2 ongoing aspiration based on swallow study which I reviewed last from September 2021.  Patient recently hospitalized and had a PEG tube placement.  Still has his pills p.o.       #3 history of Hodgkin's disease s/p bone marrow transplantation and followed by hematology he has an upcoming appointment next week.     #4 dysphagia likely due to Parkinson's disease has been on medications.    #5 Hx of PE in 2004        History of Present Illness:  This is a 71 years old gentleman with multiple medical problems referred to my clinic for further evaluation of abnormal CT scan as indicated above.  I reviewed his CT scans (three of them) from two thousand twenty-one, his swallow study as well as lab work.  He was recently seen by infectious disease for elevated ESR and elevated CRP.  He recently had a PEG tube  placement for his ongoing dysphagia and aspiration that was seen on multiple swallow studies in the past, last one in September 2021.  He has no known pulmonary problems otherwise.       Allergies   Allergen Reactions     Seasonal Allergies         Past Medical History:   Diagnosis Date     Abnormal dopamine scan (DaTSCAN) 2020 11/04/2020    [phone] 11/3/2020   Narrative & Impression   Examination: Nuclear medicine DATscan for Dopamine Receptor Localization.   Examination: NM Brain Image Tomographic (SPECT) DATscan   Date: 11/3/2020 3:21 PM   Indication: Parkinsonism   Comparison: None   Additional Information: none   Interfering Medications: None   Technique:   The patient initially received 1 ml of Lugol's solution orally prior     Antiphospholipid antibody syndrome (H)     Ravi's, noted negative per testing re-done in 2008 in hematology note 01/13/2009     Articulation disorder 09/23/2020     Atrial fibrillation (H) 04/2011     Benign prostatic hyperplasia with urinary retention      Cirrhosis (H)      CKD (chronic kidney disease) stage 2, GFR 60-89 ml/min      Diabetes mellitus type II     steroid induced     DJD (degenerative joint disease) of knee     SHAWN, worse on right     DVT (deep venous thrombosis) (H)      ED (erectile dysfunction)      Gallbladder polyp 05/2010     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     Hemochromatosis      Hodgkin's disease NOS     5 cycles of ABVD in 2011     HTN (hypertension)      Hyperlipidaemia LDL goal <100      Multiple thyroid nodules     benign      Myeloproliferative disorder (H)     atypical, [facility]     Parkinson disease (H) 09/24/2020     PE (pulmonary embolism) 11/2004     Polyneuropathy      Pressure ulcer      Primary pulmonary hypertension (H)      Severe protein-calorie malnutrition (H) 11/10/2021     Small bowel obstruction (H) 10/29/2021     Thrombocytopenia (H) 06/08/2021        Past Surgical History:   Procedure Laterality Date     ANESTHESIA CARDIOVERSION   04/21/2011    Procedure:ANESTHESIA CARDIOVERSION; Surgeon:GENERIC ANESTHESIA PROVIDER; Location:[facility] OR     ARTHROSCOPY KNEE RT/LT      LT     CATARACT IOL, RT/LT  2017     COLONOSCOPY  10/16/2012    Procedure: COLONOSCOPY;  Colonoscopy, screening;  Surgeon: Reg Feliciano MD;  Location: [facility] OR     COLONOSCOPY N/A 04/14/2021    Procedure: COLONOSCOPY;  Surgeon: Reg Holm MD;  Location: [facility] GI     ESOPHAGOSCOPY, GASTROSCOPY, DUODENOSCOPY (EGD), COMBINED N/A 10/07/2020    Procedure: ESOPHAGOGASTRODUODENOSCOPY, WITH BIOPSY;  Surgeon: Raul Sawyer DO;  Location: [facility] OR     H ABLATION FOCAL AFIB  03/05/2013    PVI     H ABLATION FOCAL AFIB  01/01/2018     HC BONE MARROW/STEM TRANSPLANT ALLOGENIC  05/08/2007     INSERT PORT VASCULAR ACCESS       IR GASTROSTOMY TUBE PERCUTANEOUS PLCMNT  10/25/2021     JOINT REPLACEMTN, KNEE RT/LT  03/28/2012    SHAWN     LAPAROSCOPY DIAGNOSTIC (GENERAL) N/A 10/29/2021    Procedure: LAPAROSCOPY, DIAGNOSTIC, TAKEDOWN OF MALPOSITIONED GASTROSTOMY TUBE, INSERTION OF GASTROSTOMY TUBE, SMALL BOWEL RESECTION X1, PRIMARY REPAIR OF SMALL BOWEL ENTEROTOMY, ABDOMINAL WASHOUT, TAP BLOCK;  Surgeon: Leif Finney DO;  Location: [facility] OR     PEG TUBE PLACEMENT  10/25/2021     VASECTOMY  1990        Social History     Socioeconomic History     Marital status:      Spouse name: Angelica     Number of children: 2     Years of education: 21     Highest education level: Not on file   Occupational History     Occupation:      Employer: [employer]     Employer: DISABLED   Tobacco Use     Smoking status: Never Smoker     Smokeless tobacco: Never Used   Substance and Sexual Activity     Alcohol use: No     Drug use: No     Sexual activity: Not Currently     Partners: Female   Other Topics Concern     Parent/sibling w/ CABG, MI or angioplasty before 65F 55M? No   Social History Narrative     about 50 yrs        Wife has some health issues - back pain - second  knee replaced    She had gastric bypass and a fib and bad mitral valve        Son in [place]    Daughter in [place] [facility] with 10 yo son.         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[employer]        Born and raised in [place]              Social Determinants of Health     Financial Resource Strain: Not on file   Food Insecurity: Not on file   Transportation Needs: Not on file   Physical Activity: Not on file   Stress: Not on file   Social Connections: Not on file   Intimate Partner Violence: Not on file   Housing Stability: Not on file        Family History   Problem Relation Age of Onset     Hypertension Mother      Cerebrovascular Disease Mother      Breast Cancer Mother      Arrhythmia Brother      Arrhythmia Sister         supraventricular tachycardia     Thyroid Disease Sister      Other - See Comments Son         lives in [place]. no kids and not .     Obsessive Compulsive Disorder Son      Depression Son      Eating Disorder Son         eats when depressed     Obesity Son      Thyroid Cancer Daughter         had thyroid removed     Bipolar Disorder Daughter      Other - See Comments Daughter         lives in [place] - single with one son 9  yrs     Other - See Comments Sister      Alzheimer Disease Father      Diabetes Paternal Aunt      Gastrointestinal Disease Maternal Grandmother         colitis      Parkinsonism Other         2 cousins with parkinson     C.A.D. No family hx of         Immunization History   Administered Date(s) Administered     COVID-19,PF,Moderna 03/01/2021, 03/29/2021, 10/20/2021     FLU 6-35 months 10/24/2008, 09/25/2009, 09/11/2012     HepB 01/29/2010, 03/01/2010, 09/11/2012     HepB-Adult 01/29/2010, 03/01/2010, 09/11/2012     Hib (PRP-T) 09/11/2012     Influenza (H1N1) 11/11/2009     Influenza (High Dose) 3 valent vaccine 10/09/2014, 10/28/2015, 09/19/2016, 10/30/2017, 09/12/2018, 09/13/2019     Influenza (IIV3) PF 12/22/2004, 09/25/2009,  11/11/2009, 11/16/2010, 12/06/2011, 09/11/2012     Influenza Vaccine IM > 6 months Valent IIV4 (Alfuria,Fluzone) 10/15/2013     Influenza, Quad, High Dose, Pf, 65yr+ (Fluzone HD) 09/24/2020, 11/10/2021     MMR 09/19/2016     Pneumo Conj 13-V (2010&after) 09/11/2012     Pneumococcal 23 valent 06/02/2005, 11/28/2011, 05/09/2013     Poliovirus, inactivated (IPV) 09/11/2012, 09/19/2016     TD (ADULT, 7+) 06/02/2005     TDAP Vaccine (Adacel) 09/11/2012     Zoster vaccine, live 09/19/2016       Current Outpatient Medications   Medication Sig     carbidopa-levodopa (SINEMET)  MG tablet Take 1 tablet by mouth 3 times daily     flecainide (TAMBOCOR) 50 MG tablet TAKE 1 TABLET BY MOUTH TWICE A DAY     fluticasone (FLONASE) 50 MCG/ACT nasal spray Spray 1 spray into both nostrils daily as needed for rhinitis     loratadine (CLARITIN REDITABS) 10 MG ODT Take 10 mg by mouth daily      Metoprolol Succinate (KAPSPARGO) 25 MG CS24 1 capsule by Tube route daily . Open capsule and add contents to an all plastic oral tip syringe; add 15 mL of water. Gently shake the syringe for ~10 seconds. Immediately deliver mixture through tube. No granules should remain in the syringe; rinse syringe with additional water if necessary.     Multiple Vitamins-Minerals (MULTIVITAMIN GUMMIES ADULT PO) Take 1 each by mouth daily     omeprazole (PRILOSEC) 20 MG DR capsule Take 1 capsule (20 mg) by mouth every morning     PREVIDENT 5000 DRY MOUTH 1.1 % GEL topical gel Apply to affected area daily      rosuvastatin (CRESTOR) 40 MG tablet Take 1 tablet (40 mg) by mouth At Bedtime     Starch-Maltodextrin (THICK-IT PO) Mixes with mountain dew code red to take pills with.     vitamin D3 (CHOLECALCIFEROL) 50 mcg (2000 units) tablet Take 1 tablet (50 mcg) by mouth daily     warfarin ANTICOAGULANT (COUMADIN) 2.5 MG tablet Hold warfarin today, restart after INR check tomorrow     Current Facility-Administered Medications   Medication     ondansetron (ZOFRAN)  injection 4 mg        Review of Systems:  I have done 10 points of review systems and all negative except for those mentioned in HPI    Physical examination  Constitutional: Oriented, not in distress  /73   Pulse 77   Wt 67.6 kg (149 lb)   SpO2 98%   BMI 21.08 kg/m    Eyes: No icterus, nystagmus, pupils isocoric  Head and neck: normal posture and movements  Respiratory: Normal tidal breathing, no shortness of breath, no audible wheezing or stridor   Musculoskeletal: Normal muscle mass, no deformity on hands/fingers  Integumentary:  No rash on visible skin areas   Neurological: Alert, orientedx3, no motor deficits  Psychiatric:  Mood and affect are appropriate with insight into his/her medical condition    Data:  Lab Results   Component Value Date    WBC 10.1 12/20/2021    WBC 8.8 07/05/2021     Lab Results   Component Value Date    RBC 4.21 12/20/2021    RBC 4.30 07/05/2021     Lab Results   Component Value Date    HGB 14.2 12/20/2021    HGB 13.8 07/05/2021     Lab Results   Component Value Date    HCT 41.2 12/20/2021    HCT 38.9 07/05/2021     Lab Results   Component Value Date    MCV 98 12/20/2021    MCV 91 07/05/2021     Lab Results   Component Value Date    MCH 33.7 12/20/2021    MCH 32.1 07/05/2021     Lab Results   Component Value Date    MCHC 34.5 12/20/2021    MCHC 35.5 07/05/2021     Lab Results   Component Value Date    RDW 13.4 12/20/2021    RDW 13.0 07/05/2021     Lab Results   Component Value Date     12/20/2021     07/05/2021       Lab Results   Component Value Date     12/20/2021     07/05/2021      Lab Results   Component Value Date    POTASSIUM 4.5 12/20/2021    POTASSIUM 3.3 07/05/2021     Lab Results   Component Value Date    CHLORIDE 99 12/20/2021    CHLORIDE 103 07/05/2021     Lab Results   Component Value Date    GILBERT 9.4 12/20/2021    GILBERT 8.3 07/05/2021     Lab Results   Component Value Date    CO2 33 12/20/2021    CO2 28 07/05/2021     Lab Results    Component Value Date    BUN 32 12/20/2021    BUN 25 07/05/2021     Lab Results   Component Value Date    CR 0.91 12/20/2021    CR 1.15 07/05/2021     Lab Results   Component Value Date     12/20/2021    GLC 97 07/05/2021         ELISA Edward MD

## 2022-01-12 NOTE — PROGRESS NOTES
[facility]  Interventional Pulmonary Clinic Virtual Visit Note    January 12, 2022    Chief complaint:  Haresh Rock is a 71 year old male seen for   Chief Complaint   Patient presents with     Oncology Clinic Visit     history of hodgkins lymphoma       Reason for clinic visit / Chief complaint:   Abnormal CT chest     Assessment and Plan:  #1  Bibasilar groundglass opacifications on recent CT scan which I reviewed likely representing inflammation due to ongoing aspiration.    In my opinion, his inflammatory changes in the lung are unlikely to be because of high ESR and CRP (now normalized). Current CT from today which I reviewed and my interpretation is that inflammatory changes has resolved. Has pleural based scarring in 2 areas on the right side which are also stable at least since 2017 CT scans to my review.     #2 ongoing aspiration based on swallow study which I reviewed last from September 2021.  Patient recently hospitalized and had a PEG tube placement.  Still has his pills p.o.       #3 history of Hodgkin's disease s/p bone marrow transplantation and followed by hematology he has an upcoming appointment next week.     #4 dysphagia likely due to Parkinson's disease has been on medications.    #5 Hx of PE in 2004        History of Present Illness:  This is a 71 years old gentleman with multiple medical problems referred to my clinic for further evaluation of abnormal CT scan as indicated above.  I reviewed his CT scans (three of them) from two thousand twenty-one, his swallow study as well as lab work.  He was recently seen by infectious disease for elevated ESR and elevated CRP.  He recently had a PEG tube placement for his ongoing dysphagia and aspiration that was seen on multiple swallow studies in the past, last one in September 2021.  He has no known pulmonary problems otherwise.       Allergies   Allergen Reactions     Seasonal Allergies         Past Medical History:   Diagnosis Date     Abnormal  dopamine scan (DaTSCAN) 2020 11/04/2020    [phone] 11/3/2020   Narrative & Impression   Examination: Nuclear medicine DATscan for Dopamine Receptor Localization.   Examination: NM Brain Image Tomographic (SPECT) DATscan   Date: 11/3/2020 3:21 PM   Indication: Parkinsonism   Comparison: None   Additional Information: none   Interfering Medications: None   Technique:   The patient initially received 1 ml of Lugol's solution orally prior     Antiphospholipid antibody syndrome (H)     Ravi's, noted negative per testing re-done in 2008 in hematology note 01/13/2009     Articulation disorder 09/23/2020     Atrial fibrillation (H) 04/2011     Benign prostatic hyperplasia with urinary retention      Cirrhosis (H)      CKD (chronic kidney disease) stage 2, GFR 60-89 ml/min      Diabetes mellitus type II     steroid induced     DJD (degenerative joint disease) of knee     SHAWN, worse on right     DVT (deep venous thrombosis) (H)      ED (erectile dysfunction)      Gallbladder polyp 05/2010     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     Hemochromatosis      Hodgkin's disease NOS     5 cycles of ABVD in 2011     HTN (hypertension)      Hyperlipidaemia LDL goal <100      Multiple thyroid nodules     benign      Myeloproliferative disorder (H)     atypical, [facility]     Parkinson disease (H) 09/24/2020     PE (pulmonary embolism) 11/2004     Polyneuropathy      Pressure ulcer      Primary pulmonary hypertension (H)      Severe protein-calorie malnutrition (H) 11/10/2021     Small bowel obstruction (H) 10/29/2021     Thrombocytopenia (H) 06/08/2021        Past Surgical History:   Procedure Laterality Date     ANESTHESIA CARDIOVERSION  04/21/2011    Procedure:ANESTHESIA CARDIOVERSION; Surgeon:GENERIC ANESTHESIA PROVIDER; Location:[facility] OR     ARTHROSCOPY KNEE RT/LT      LT     CATARACT IOL, RT/LT  2017     COLONOSCOPY  10/16/2012    Procedure: COLONOSCOPY;  Colonoscopy, screening;  Surgeon: Reg Feliciano MD;  Location:  OR      COLONOSCOPY N/A 04/14/2021    Procedure: COLONOSCOPY;  Surgeon: Reg Holm MD;  Location: [facility] GI     ESOPHAGOSCOPY, GASTROSCOPY, DUODENOSCOPY (EGD), COMBINED N/A 10/07/2020    Procedure: ESOPHAGOGASTRODUODENOSCOPY, WITH BIOPSY;  Surgeon: Raul Sawyer DO;  Location: [facility] OR     H ABLATION FOCAL AFIB  03/05/2013    PVI     H ABLATION FOCAL AFIB  01/01/2018     HC BONE MARROW/STEM TRANSPLANT ALLOGENIC  05/08/2007     INSERT PORT VASCULAR ACCESS       IR GASTROSTOMY TUBE PERCUTANEOUS PLCMNT  10/25/2021     JOINT REPLACEMTN, KNEE RT/LT  03/28/2012    SHAWN     LAPAROSCOPY DIAGNOSTIC (GENERAL) N/A 10/29/2021    Procedure: LAPAROSCOPY, DIAGNOSTIC, TAKEDOWN OF MALPOSITIONED GASTROSTOMY TUBE, INSERTION OF GASTROSTOMY TUBE, SMALL BOWEL RESECTION X1, PRIMARY REPAIR OF SMALL BOWEL ENTEROTOMY, ABDOMINAL WASHOUT, TAP BLOCK;  Surgeon: Leif Finney DO;  Location: [facility] OR     PEG TUBE PLACEMENT  10/25/2021     VASECTOMY  1990        Social History     Socioeconomic History     Marital status:      Spouse name: Angelica     Number of children: 2     Years of education: 21     Highest education level: Not on file   Occupational History     Occupation:      Employer: [employer]     Employer: [employer]   Tobacco Use     Smoking status: Never Smoker     Smokeless tobacco: Never Used   Substance and Sexual Activity     Alcohol use: No     Drug use: No     Sexual activity: Not Currently     Partners: Female   Other Topics Concern     Parent/sibling w/ CABG, MI or angioplasty before 65F 55M? No   Social History Narrative     about 50 yrs        Wife has some health issues - back pain - second knee replaced    She had gastric bypass and a fib and bad mitral valve        Son in [place]    Daughter in [facility] with 10 yo son.         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[employer]        Born and raised in [place]              Social Determinants of Health     Financial  Resource Strain: Not on file   Food Insecurity: Not on file   Transportation Needs: Not on file   Physical Activity: Not on file   Stress: Not on file   Social Connections: Not on file   Intimate Partner Violence: Not on file   Housing Stability: Not on file        Family History   Problem Relation Age of Onset     Hypertension Mother      Cerebrovascular Disease Mother      Breast Cancer Mother      Arrhythmia Brother      Arrhythmia Sister         supraventricular tachycardia     Thyroid Disease Sister      Other - See Comments Son         lives in [place]. no kids and not .     Obsessive Compulsive Disorder Son      Depression Son      Eating Disorder Son         eats when depressed     Obesity Son      Thyroid Cancer Daughter         had thyroid removed     Bipolar Disorder Daughter      Other - See Comments Daughter         lives in [place] - single with one son 9  yrs     Other - See Comments Sister      Alzheimer Disease Father      Diabetes Paternal Aunt      Gastrointestinal Disease Maternal Grandmother         colitis      Parkinsonism Other         2 cousins with parkinson     C.A.D. No family hx of         Immunization History   Administered Date(s) Administered     COVID-19,PF,Moderna 03/01/2021, 03/29/2021, 10/20/2021     FLU 6-35 months 10/24/2008, 09/25/2009, 09/11/2012     HepB 01/29/2010, 03/01/2010, 09/11/2012     HepB-Adult 01/29/2010, 03/01/2010, 09/11/2012     Hib (PRP-T) 09/11/2012     Influenza (H1N1) 11/11/2009     Influenza (High Dose) 3 valent vaccine 10/09/2014, 10/28/2015, 09/19/2016, 10/30/2017, 09/12/2018, 09/13/2019     Influenza (IIV3) PF 12/22/2004, 09/25/2009, 11/11/2009, 11/16/2010, 12/06/2011, 09/11/2012     Influenza Vaccine IM > 6 months Valent IIV4 (Alfuria,Fluzone) 10/15/2013     Influenza, Quad, High Dose, Pf, 65yr+ (Fluzone HD) 09/24/2020, 11/10/2021     MMR 09/19/2016     Pneumo Conj 13-V (2010&after) 09/11/2012     Pneumococcal 23 valent 06/02/2005,  11/28/2011, 05/09/2013     Poliovirus, inactivated (IPV) 09/11/2012, 09/19/2016     TD (ADULT, 7+) 06/02/2005     TDAP Vaccine (Adacel) 09/11/2012     Zoster vaccine, live 09/19/2016       Current Outpatient Medications   Medication Sig     carbidopa-levodopa (SINEMET)  MG tablet Take 1 tablet by mouth 3 times daily     flecainide (TAMBOCOR) 50 MG tablet TAKE 1 TABLET BY MOUTH TWICE A DAY     fluticasone (FLONASE) 50 MCG/ACT nasal spray Spray 1 spray into both nostrils daily as needed for rhinitis     loratadine (CLARITIN REDITABS) 10 MG ODT Take 10 mg by mouth daily      Metoprolol Succinate (KAPSPARGO) 25 MG CS24 1 capsule by Tube route daily . Open capsule and add contents to an all plastic oral tip syringe; add 15 mL of water. Gently shake the syringe for ~10 seconds. Immediately deliver mixture through tube. No granules should remain in the syringe; rinse syringe with additional water if necessary.     Multiple Vitamins-Minerals (MULTIVITAMIN GUMMIES ADULT PO) Take 1 each by mouth daily     omeprazole (PRILOSEC) 20 MG DR capsule Take 1 capsule (20 mg) by mouth every morning     PREVIDENT 5000 DRY MOUTH 1.1 % GEL topical gel Apply to affected area daily      rosuvastatin (CRESTOR) 40 MG tablet Take 1 tablet (40 mg) by mouth At Bedtime     Starch-Maltodextrin (THICK-IT PO) Mixes with mountain dew code red to take pills with.     vitamin D3 (CHOLECALCIFEROL) 50 mcg (2000 units) tablet Take 1 tablet (50 mcg) by mouth daily     warfarin ANTICOAGULANT (COUMADIN) 2.5 MG tablet Hold warfarin today, restart after INR check tomorrow     Current Facility-Administered Medications   Medication     ondansetron (ZOFRAN) injection 4 mg        Review of Systems:  I have done 10 points of review systems and all negative except for those mentioned in HPI    Physical examination  Constitutional: Oriented, not in distress  /73   Pulse 77   Wt 67.6 kg (149 lb)   SpO2 98%   BMI 21.08 kg/m    Eyes: No icterus,  nystagmus, pupils isocoric  Head and neck: normal posture and movements  Respiratory: Normal tidal breathing, no shortness of breath, no audible wheezing or stridor   Musculoskeletal: Normal muscle mass, no deformity on hands/fingers  Integumentary:  No rash on visible skin areas   Neurological: Alert, orientedx3, no motor deficits  Psychiatric:  Mood and affect are appropriate with insight into his/her medical condition    Data:  Lab Results   Component Value Date    WBC 10.1 12/20/2021    WBC 8.8 07/05/2021     Lab Results   Component Value Date    RBC 4.21 12/20/2021    RBC 4.30 07/05/2021     Lab Results   Component Value Date    HGB 14.2 12/20/2021    HGB 13.8 07/05/2021     Lab Results   Component Value Date    HCT 41.2 12/20/2021    HCT 38.9 07/05/2021     Lab Results   Component Value Date    MCV 98 12/20/2021    MCV 91 07/05/2021     Lab Results   Component Value Date    MCH 33.7 12/20/2021    MCH 32.1 07/05/2021     Lab Results   Component Value Date    MCHC 34.5 12/20/2021    MCHC 35.5 07/05/2021     Lab Results   Component Value Date    RDW 13.4 12/20/2021    RDW 13.0 07/05/2021     Lab Results   Component Value Date     12/20/2021     07/05/2021       Lab Results   Component Value Date     12/20/2021     07/05/2021      Lab Results   Component Value Date    POTASSIUM 4.5 12/20/2021    POTASSIUM 3.3 07/05/2021     Lab Results   Component Value Date    CHLORIDE 99 12/20/2021    CHLORIDE 103 07/05/2021     Lab Results   Component Value Date    GILBERT 9.4 12/20/2021    GILBERT 8.3 07/05/2021     Lab Results   Component Value Date    CO2 33 12/20/2021    CO2 28 07/05/2021     Lab Results   Component Value Date    BUN 32 12/20/2021    BUN 25 07/05/2021     Lab Results   Component Value Date    CR 0.91 12/20/2021    CR 1.15 07/05/2021     Lab Results   Component Value Date     12/20/2021    GLC 97 07/05/2021         ELISA Edward MD

## 2022-01-15 ENCOUNTER — TRANSFERRED RECORDS (OUTPATIENT)
Dept: HEALTH INFORMATION MANAGEMENT | Facility: CLINIC | Age: 72
End: 2022-01-15
Payer: COMMERCIAL

## 2022-01-15 LAB — INR (EXTERNAL): 2.2 (ref 0.9–1.1)

## 2022-01-17 ENCOUNTER — DOCUMENTATION ONLY (OUTPATIENT)
Dept: ANTICOAGULATION | Facility: CLINIC | Age: 72
End: 2022-01-17
Payer: COMMERCIAL

## 2022-01-17 DIAGNOSIS — Z79.01 LONG TERM CURRENT USE OF ANTICOAGULANT THERAPY: Primary | ICD-10-CM

## 2022-01-17 DIAGNOSIS — I48.0 PAROXYSMAL ATRIAL FIBRILLATION (H): ICD-10-CM

## 2022-01-17 DIAGNOSIS — I48.91 ATRIAL FIBRILLATION (H): ICD-10-CM

## 2022-01-17 NOTE — PROGRESS NOTES
ANTICOAGULATION  MANAGEMENT-Home Monitor Managed by Exception    Haresh Rock 71 year old male is on warfarin with therapeutic INR result. (Goal INR 2.0-3.0)    Recent labs: (last 7 days)     01/15/22  0937   INR 2.2*         Previous INR was Therapeutic    Medication, diet, health changes since last INR:chart reviewed; none identified    Contacted within the last 12 weeks by phone on 12/24/2021      MINNIE Hutson was NOT contacted regarding therapeutic result today per home monitoring policy manage by exception agreement.   Current warfarin dose is to be continued:     Summary  As of 1/17/2022    Full warfarin instructions:  5 mg every Fri; 3.75 mg all other days   Next INR check:  1/21/2022           ?   Hari Saavedra RN  Anticoagulation Clinic  1/17/2022    _______________________________________________________________________     Anticoagulation Episode Summary     Current INR goal:  2.0-3.0   TTR:  62.1 % (11.7 mo)   Target end date:  Indefinite   Send INR reminders to:  CHELSEA CHILEL    Indications    Long-term (current) use of anticoagulants [Z79.01] [Z79.01]  Atrial fibrillation (H) [I48.91]  Antiphospholipid antibody syndrome (H) (Resolved) [D68.61]  Personal history of DVT (deep vein thrombosis) (Resolved) [Z86.718]  Atrial fibrillation  unspecified type (H) (Resolved) [I48.91]  Paroxysmal atrial fibrillation (H) [I48.0]           Comments:  JESSICA rodas to manage by exception         Anticoagulation Care Providers     Provider Role Specialty Phone number    Anya Nowak MD Referring Family Medicine [phone]

## 2022-01-18 ENCOUNTER — MEDICAL CORRESPONDENCE (OUTPATIENT)
Dept: HEALTH INFORMATION MANAGEMENT | Facility: CLINIC | Age: 72
End: 2022-01-18
Payer: COMMERCIAL

## 2022-01-21 ENCOUNTER — ANTICOAGULATION THERAPY VISIT (OUTPATIENT)
Dept: FAMILY MEDICINE | Facility: CLINIC | Age: 72
End: 2022-01-21
Payer: COMMERCIAL

## 2022-01-21 ENCOUNTER — TRANSFERRED RECORDS (OUTPATIENT)
Dept: HEALTH INFORMATION MANAGEMENT | Facility: CLINIC | Age: 72
End: 2022-01-21
Payer: COMMERCIAL

## 2022-01-21 ENCOUNTER — MYC MEDICAL ADVICE (OUTPATIENT)
Dept: FAMILY MEDICINE | Facility: CLINIC | Age: 72
End: 2022-01-21
Payer: COMMERCIAL

## 2022-01-21 DIAGNOSIS — D68.61 ANTIPHOSPHOLIPID ANTIBODY SYNDROME (H): ICD-10-CM

## 2022-01-21 DIAGNOSIS — I48.0 PAROXYSMAL ATRIAL FIBRILLATION (H): ICD-10-CM

## 2022-01-21 DIAGNOSIS — I48.91 ATRIAL FIBRILLATION (H): ICD-10-CM

## 2022-01-21 DIAGNOSIS — Z79.01 LONG TERM CURRENT USE OF ANTICOAGULANT THERAPY: Primary | ICD-10-CM

## 2022-01-21 LAB — INR (EXTERNAL): 1.9 (ref 0.9–1.1)

## 2022-01-21 RX ORDER — WARFARIN SODIUM 2.5 MG/1
TABLET ORAL
Qty: 180 TABLET | Refills: 1 | Status: SHIPPED | OUTPATIENT
Start: 2022-01-21 | End: 2022-06-08

## 2022-01-21 RX ORDER — WARFARIN SODIUM 2.5 MG/1
TABLET ORAL
Qty: 90 TABLET | Refills: 3 | Status: CANCELLED | OUTPATIENT
Start: 2022-01-21

## 2022-01-21 NOTE — TELEPHONE ENCOUNTER
Prescription approved per [facility] Refill Protocol.  INR/Prothrombin Time  INR   Date Value Ref Range Status   07/16/2021 1.9 (A) 2 - 3 Final     INR Protime   Date Value Ref Range Status   11/19/2021 2.4 (A) 0.9 - 1.1 Final     INR (External)   Date Value Ref Range Status   01/21/2022 1.9 (A) 0.9 - 1.1 Final      Macrina Avila, RN, BSN, PHN  Anticoagulation Nurse  [phone]  d

## 2022-01-28 ENCOUNTER — TRANSFERRED RECORDS (OUTPATIENT)
Dept: HEALTH INFORMATION MANAGEMENT | Facility: CLINIC | Age: 72
End: 2022-01-28
Payer: COMMERCIAL

## 2022-01-28 ENCOUNTER — ANTICOAGULATION THERAPY VISIT (OUTPATIENT)
Dept: ANTICOAGULATION | Facility: CLINIC | Age: 72
End: 2022-01-28
Payer: COMMERCIAL

## 2022-01-28 DIAGNOSIS — I48.0 PAROXYSMAL ATRIAL FIBRILLATION (H): ICD-10-CM

## 2022-01-28 DIAGNOSIS — I48.91 ATRIAL FIBRILLATION (H): ICD-10-CM

## 2022-01-28 DIAGNOSIS — Z79.01 LONG TERM CURRENT USE OF ANTICOAGULANT THERAPY: Primary | ICD-10-CM

## 2022-01-28 LAB — INR (EXTERNAL): 2.1 (ref 0.9–1.1)

## 2022-01-28 NOTE — PROGRESS NOTES
ANTICOAGULATION MANAGEMENT     Haresh Rock 71 year old male is on warfarin with therapeutic INR result. (Goal INR 2.0-3.0)    Recent labs: (last 7 days)     01/28/22  1721   INR 2.1*       ASSESSMENT     Source(s): Chart Review and Patient/Caregiver Call       Warfarin doses taken: Warfarin taken as instructed    Diet: No new diet changes identified    New illness, injury, or hospitalization: No    Medication/supplement changes: None noted    Signs or symptoms of bleeding or clotting: No    Previous INR: Subtherapeutic    Additional findings: None     PLAN     Recommended plan for no diet, medication or health factor changes affecting INR     Dosing Instructions: Continue your current warfarin dose with next INR in 1 week       Summary  As of 1/28/2022    Full warfarin instructions:  5 mg every Fri; 3.75 mg all other days   Next INR check:  2/4/2022             Telephone call with Haresh who verbalizes understanding and agrees to plan    Patient to recheck with home meter    Education provided: Goal range and significance of current result    Plan made per [facility] anticoagulation protocol    Hari Saavedra RN  Anticoagulation Clinic  1/28/2022    _______________________________________________________________________     Anticoagulation Episode Summary     Current INR goal:  2.0-3.0   TTR:  60.8 % (11.7 mo)   Target end date:  Indefinite   Send INR reminders to:  CHELSEA CHILEL    Indications    Long-term (current) use of anticoagulants [Z79.01] [Z79.01]  Atrial fibrillation (H) [I48.91]  Antiphospholipid antibody syndrome (H) (Resolved) [D68.61]  Personal history of DVT (deep vein thrombosis) (Resolved) [Z86.718]  Atrial fibrillation  unspecified type (H) (Resolved) [I48.91]  Paroxysmal atrial fibrillation (H) [I48.0]           Comments:  JESSICA rodas to manage by exception         Anticoagulation Care Providers     Provider Role Specialty Phone number    Anya Nowak MD Referring Family Medicine  [phone]

## 2022-02-04 ENCOUNTER — TRANSFERRED RECORDS (OUTPATIENT)
Dept: HEALTH INFORMATION MANAGEMENT | Facility: CLINIC | Age: 72
End: 2022-02-04
Payer: COMMERCIAL

## 2022-02-04 ENCOUNTER — DOCUMENTATION ONLY (OUTPATIENT)
Dept: ANTICOAGULATION | Facility: CLINIC | Age: 72
End: 2022-02-04
Payer: COMMERCIAL

## 2022-02-04 DIAGNOSIS — I48.0 PAROXYSMAL ATRIAL FIBRILLATION (H): ICD-10-CM

## 2022-02-04 DIAGNOSIS — I48.91 ATRIAL FIBRILLATION (H): ICD-10-CM

## 2022-02-04 DIAGNOSIS — Z79.01 LONG TERM CURRENT USE OF ANTICOAGULANT THERAPY: Primary | ICD-10-CM

## 2022-02-04 LAB — INR (EXTERNAL): 2.1 (ref 0.9–1.1)

## 2022-02-04 NOTE — PROGRESS NOTES
ANTICOAGULATION  MANAGEMENT-Home Monitor Managed by Exception    Haresh EMILIE Rock 71 year old male is on warfarin with therapeutic INR result. (Goal INR 2.0-3.0)    Recent labs: (last 7 days)     02/04/22  0000   INR 2.1*         Previous INR was Therapeutic    Medication, diet, health changes since last INR:chart reviewed; none identified    Contacted within the last 12 weeks by phone on 1/28/2022      MINNIE Hutson was NOT contacted regarding therapeutic result today per home monitoring policy manage by exception agreement.   Current warfarin dose is to be continued:     Summary  As of 2/4/2022    Full warfarin instructions:  5 mg every Fri; 3.75 mg all other days   Next INR check:  2/11/2022           ?   Mayi Ayala RN  Anticoagulation Clinic  2/4/2022    _______________________________________________________________________     Anticoagulation Episode Summary     Current INR goal:  2.0-3.0   TTR:  61.4 % (11.7 mo)   Target end date:  Indefinite   Send INR reminders to:  CHELSEA CHILEL    Indications    Long-term (current) use of anticoagulants [Z79.01] [Z79.01]  Atrial fibrillation (H) [I48.91]  Antiphospholipid antibody syndrome (H) (Resolved) [D68.61]  Personal history of DVT (deep vein thrombosis) (Resolved) [Z86.718]  Atrial fibrillation  unspecified type (H) (Resolved) [I48.91]  Paroxysmal atrial fibrillation (H) [I48.0]           Comments:  JESSICA rodas to manage by exception         Anticoagulation Care Providers     Provider Role Specialty Phone number    Anya Nowak MD Referring Family Medicine [phone]

## 2022-02-04 NOTE — PROGRESS NOTES
Received a fax INR result for Haresh from [facility]    INR result dated 2/4/2022 is 2.1    Warren Solorzano RN, BSN  Anticoagulation Clinic

## 2022-02-07 ENCOUNTER — HOME INFUSION (PRE-WILLOW HOME INFUSION) (OUTPATIENT)
Dept: PHARMACY | Facility: CLINIC | Age: 72
End: 2022-02-07

## 2022-02-07 NOTE — PROGRESS NOTES
This is a recent snapshot of the patient's [facility] medical record.  For current drug dose and complete information and questions, call [phone] or In Basket pool, [facility] home infusion ([id number])  CSN Number:  [id number]

## 2022-02-08 ENCOUNTER — PATIENT OUTREACH (OUTPATIENT)
Dept: NURSING | Facility: CLINIC | Age: 72
End: 2022-02-08
Payer: COMMERCIAL

## 2022-02-08 ASSESSMENT — ACTIVITIES OF DAILY LIVING (ADL): DEPENDENT_IADLS:: INDEPENDENT

## 2022-02-08 NOTE — PROGRESS NOTES
Clinic Care Coordination Contact    Follow Up Progress Note      Assessment: The RN CC nurse care coordinator reached out by phone for a follow up call.  The patient states that he is doing well with shoveling the snow.  The patient does not go out when the temperature is cold as that increases his chance for a fall.  Per the patient he has made an eye exam appointment.  The patient will reach out to make an appointment with the PCP.      Medication review was completed with the patient and marked as reviewed.  Health maintenance was reviewed and questions were answered with the patient.  The assessment for CKD was completed with the patient today as well as the social determinants of health and they were marked as reviewed.      Care Gaps:    Health Maintenance Due   Topic Date Due     ZOSTER IMMUNIZATION (2 of 3) 11/14/2016     DIABETIC FOOT EXAM  04/23/2020     EYE EXAM  01/01/2022       Care Gaps Last addressed on 2/8/22, Care Gap Goal set: Yes and Patient accepted scheduling phone number for [phone]  to schedule independently     Goals addressed this encounter:   Goals Addressed                    This Visit's Progress       #1  Other - Care Gaps (pt-stated)   40%      Goal Statement: I will work with my PCP to close my care gaps by scheduling an eye exam, and scheduling a diabetic foot exam.  Date Goal set: 12/13/21  Barriers: The patient suffers from Parkinson.  Strengths: engaged in care coordination  Date to Achieve By: 6/13/22  Patient expressed understanding of goal: yes  Action steps to achieve this goal:  1. I will speak with my PCP about scheduling a diabetic foot exam.  2. I will make an appointment for an annual eye exam.  3. I will ask my PCP about which immunization are appropriate for me.           #2  Functional (pt-stated)   40%      Goal Statement: I will work on walking without the walker, and increasing my strength and stamina.  Date Goal set: 12/13/21  Barriers: suffers from  parkinson's  Strengths: engaged in care coordination  Date to Achieve By: 6/13/22  Patient expressed understanding of goal: yes  Action steps to achieve this goal:  1. I will practice in the house walking without the walker.  2. I will go outside without the walker when I feel strong enough and have increased my strength and stamina.  3. I will frequently walk around inside the house to increase my strength and stamina.              Intervention/Education provided during outreach: Reviewed care gaps and where to reach out for an appointment.     Outreach Frequency: monthly    Plan:   1.  The patient will attend the eye exam appointment that he has set.  2.  The patient will make and attend a follow up appointment with the PCP to discuss the remaining care gaps.  3.  The patient will be extremely careful shoveling snow so that he does not fall.    RN CC Nurse Care Coordinator will follow up in 4 weeks.            Uriel Zhou MSN, RN, PHN, [facility]   Primary Care Clinical RN Care Coordinator  [facility]  2/8/2022   10:50 AM  [email or web address]  Office: [phone]

## 2022-02-08 NOTE — LETTER
[facility]  Patient Centered Plan of Care  About Me:        Patient Name:  Haresh Rock    YOB: 1950  Age:         71 year old   Radha MRN:    [id number] Telephone Information:  Home Phone [phone]   Mobile [phone]       Address:  [address] [place] [address] Email address:  [email or web address]      Emergency Contact(s)    Name Relationship Lgl Grd Work Phone Home Phone Mobile Phone   1. JANICE ROCK Spouse No   [phone]   2. LAY ROCK Daughter No   [phone]   3. SOL ROCK Son No   [phone]           Primary language:  English     needed? No   [facility] [employer] [facility]:  [phone] op. 1  Other communication barriers:Glasses; Language barrier    Preferred Method of Communication:  Mail  Current living arrangement: I live in a private home with family    Mobility Status/ Medical Equipment: Independent w/Device        Health Maintenance  Health Maintenance Reviewed: Due/Overdue   Health Maintenance Due   Topic Date Due     ZOSTER IMMUNIZATION (2 of 3) 11/14/2016     DIABETIC FOOT EXAM  04/23/2020     EYE EXAM  01/01/2022           My Access Plan  Medical Emergency 911   Primary Clinic Line [facility] - [phone]   24 Hour Appointment Line [phone] or  [phone]) (toll-free)   24 Hour Nurse Line [phone] (toll-free)   Preferred Urgent Care No data recorded   Preferred Hospital [facility]  [phone]     Preferred Pharmacy [id number] - [place] - [address] [facility]     Behavioral Health Crisis Line The National Suicide Prevention Lifeline at 1-646.650.5090 or 911             My Care Team Members  Patient Care Team       Relationship Specialty Notifications Start End    Anya Nowak MD PCP - General Family Practice  4/23/19     Phone: [phone] Fax: [phone] [address] [facility] [place] [address]     Augusto Miller MD Referring Physician Internal Medicine  9/30/11     Referred to Endocrinology    Phone: [phone] Fax: [phone]         [address] [facility] [address]    Jesusita Mejia PA-C Physician Assistant   3/21/12     Phone: [phone] Fax: [phone]         [address] [facility] [address] [facility] [address]    Pino Sharma MD MD Internal Medicine  4/11/16     Phone: [phone] Fax: [phone]         [address] [facility] [address] [facility] [address]    Fabi Jimenez MD MD INTERNAL MEDICINE - ENDOCRINOLOGY, DIABETES & METABOLISM  4/18/16     Phone: [phone] Fax: [phone]         [address] [facility] [address] [facility] [address]    Bekah Matt MD MD Cardiology  8/12/16     Phone: [phone] Fax: [phone]         [address] [facility] [address] [facility] [address]    Tina Mejia RN Specialty Care Coordinator Cardiology  3/6/19     Phone: [phone] Fax: [phone]         [address] [facility] [address]    Anya Nowak MD Assigned PCP   4/28/19     Phone: [phone] Fax: [phone] [address] [facility] [address]    Bekah Matt MD Assigned Heart and Vascular Provider   10/23/20     Phone: [phone] Fax: [phone]         [address] [facility] [address] [place] [address]    Ángel Hill MD Assigned Neuroscience Provider   11/15/20     Phone: [phone] Fax: [phone]         [address] [place] [address]    Rosalva Samuels MD MD Family Medicine  6/1/21     Phone: [phone] Fax: [phone] [address] [facility] [address]    Chelsey Crews MD Assigned Gastroenterology Provider   6/20/21     Phone: [phone] Fax: [phone]         [address] [facility] [address] [facility] [address]    Jose G Hawley MD Assigned Surgical Provider   8/1/21     Phone: [phone] Fax: [phone] [address] [place] [address]    Bekah Matt MD MD Clinical Cardiac  Electrophysiology  9/10/21     Phone: [phone] Fax: [phone]         [address] [facility] [address] [facility] [address]    Marcelo Jacques MD Assigned Infectious Disease Provider   9/12/21     Phone: [phone] Fax: [phone]         [address] [facility] [address]    Uriel Van, RN Lead Care Coordinator Primary Care - CC Admissions 11/5/21     Phone: [phone] Fax: [phone]        Lizbeth Chen [facility] Pharmacist Pharmacist Ambulatory Care  11/12/21     Phone: [phone] [address] [facility] [address]    Mica Edwards, PhD LP Assigned Behavioral Health Provider   11/21/21     Phone: [phone] Fax: [phone]         [address] [facility] [address]    Vahid Edward MD Assigned Pulmonology Provider   12/12/21     Phone: [phone] Fax: [phone]         [address] [place] [address] [address] [facility] [address]    Leslie Clifford [facility] Pharmacist Pharmacist  12/13/21     Phone: [phone] Pager: [phone]         [address] [facility] [address]    Bonnie Walls RN Specialty Care Coordinator Hematology & Oncology  12/16/21     Phone: [phone] Pager: [phone]                My Care Plans  Self Management and Treatment Plan  Goals and (Comments)  Goals        General     #1  Other - Care Gaps (pt-stated)      Notes - Note created  12/13/2021  3:17 PM by Uriel Van, RN     Goal Statement: I will work with my PCP to close my care gaps by scheduling an eye exam, and scheduling a diabetic foot exam.  Date Goal set: 12/13/21  Barriers: The patient suffers from Parkinson.  Strengths: engaged in care coordination  Date to Achieve By: 6/13/22  Patient expressed understanding of goal: yes  Action steps to achieve this goal:  1. I will speak with my PCP about scheduling a diabetic foot exam.  2. I will make an appointment for an annual eye exam.  3. I will ask my PCP about which immunization are appropriate for me.         #2   Functional (pt-stated)      Notes - Note created  12/13/2021  3:22 PM by Uriel Van RN     Goal Statement: I will work on walking without the walker, and increasing my strength and stamina.  Date Goal set: 12/13/21  Barriers: suffers from parkinson's  Strengths: engaged in care coordination  Date to Achieve By: 6/13/22  Patient expressed understanding of goal: yes  Action steps to achieve this goal:  1. I will practice in the house walking without the walker.  2. I will go outside without the walker when I feel strong enough and have increased my strength and stamina.  3. I will frequently walk around inside the house to increase my strength and stamina.               Action Plans on File:                       Advance Care Plans/Directives Type:   No data recorded    My Medical and Care Information  Problem List   Patient Active Problem List   Diagnosis     Hemochromatosis     Nvktc-piivtf-owcv disease, chronic (H)     Advanced directives, counseling/discussion     Polyneuropathy     Status post total knee replacements     Status post bone marrow transplant (H)     Hyperlipidemia with target LDL less than 100     Atrial fibrillation (H)     Chronic rhinitis     Gallstones without obstruction of gallbladder     Long-term (current) use of anticoagulants [Z79.01]     Thyroid nodule     Type 2 diabetes mellitus with stage 2 chronic kidney disease, without long-term current use of insulin (H)     History of Hodgkin's lymphoma     History of irradiation, presenting hazards to health     Primary pulmonary hypertension (H)     Hereditary hemochromatosis (H)     Oropharyngeal dysphagia     Articulation disorder     Personal history of PE (pulmonary embolism)     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    Care Coordination Start Date: 11/5/2021   Frequency of Care Coordination: monthly     Form Last Updated: 02/08/2022

## 2022-02-11 ENCOUNTER — TRANSFERRED RECORDS (OUTPATIENT)
Dept: HEALTH INFORMATION MANAGEMENT | Facility: CLINIC | Age: 72
End: 2022-02-11
Payer: COMMERCIAL

## 2022-02-11 LAB — INR (EXTERNAL): 2.8 (ref 0.9–1.1)

## 2022-02-14 ENCOUNTER — DOCUMENTATION ONLY (OUTPATIENT)
Dept: FAMILY MEDICINE | Facility: CLINIC | Age: 72
End: 2022-02-14
Payer: COMMERCIAL

## 2022-02-14 DIAGNOSIS — Z79.01 LONG TERM CURRENT USE OF ANTICOAGULANT THERAPY: Primary | ICD-10-CM

## 2022-02-14 DIAGNOSIS — I48.0 PAROXYSMAL ATRIAL FIBRILLATION (H): ICD-10-CM

## 2022-02-14 DIAGNOSIS — I48.91 ATRIAL FIBRILLATION (H): ICD-10-CM

## 2022-02-14 NOTE — PROGRESS NOTES
ANTICOAGULATION  MANAGEMENT-Home Monitor Managed by Exception    Haresh EMILIE Rock 71 year old male is on warfarin with therapeutic INR result. (Goal INR 2.0-3.0)    Recent labs: (last 7 days)     02/11/22  0812   INR 2.8*         Previous INR was Therapeutic    Medication, diet, health changes since last INR:chart reviewed; none identified    Contacted within the last 12 weeks by phone on 1/28/22      MINNIE Hutson was NOT contacted regarding therapeutic result today per home monitoring policy manage by exception agreement.   Current warfarin dose is to be continued:     Summary  As of 2/14/2022    Full warfarin instructions:  5 mg every Fri; 3.75 mg all other days   Next INR check:  2/18/2022           ?   Shanice Hyde RN  Anticoagulation Clinic  2/14/2022    _______________________________________________________________________     Anticoagulation Episode Summary     Current INR goal:  2.0-3.0   TTR:  62.7 % (11.6 mo)   Target end date:  Indefinite   Send INR reminders to:  CHELSEA CHILEL    Indications    Long-term (current) use of anticoagulants [Z79.01] [Z79.01]  Atrial fibrillation (H) [I48.91]  Antiphospholipid antibody syndrome (H) (Resolved) [D68.61]  Personal history of DVT (deep vein thrombosis) (Resolved) [Z86.718]  Atrial fibrillation  unspecified type (H) (Resolved) [I48.91]  Paroxysmal atrial fibrillation (H) [I48.0]           Comments:  JESSICA rodas to manage by exception         Anticoagulation Care Providers     Provider Role Specialty Phone number    Anya Nowak MD Referring Family Medicine [phone]

## 2022-02-17 PROBLEM — R94.02 ABNORMAL BRAIN SCAN: Status: ACTIVE | Noted: 2020-11-04

## 2022-02-18 ENCOUNTER — DOCUMENTATION ONLY (OUTPATIENT)
Dept: ANTICOAGULATION | Facility: CLINIC | Age: 72
End: 2022-02-18
Payer: COMMERCIAL

## 2022-02-18 ENCOUNTER — TRANSFERRED RECORDS (OUTPATIENT)
Dept: HEALTH INFORMATION MANAGEMENT | Facility: CLINIC | Age: 72
End: 2022-02-18
Payer: COMMERCIAL

## 2022-02-18 ENCOUNTER — MEDICAL CORRESPONDENCE (OUTPATIENT)
Dept: HEALTH INFORMATION MANAGEMENT | Facility: CLINIC | Age: 72
End: 2022-02-18
Payer: COMMERCIAL

## 2022-02-18 DIAGNOSIS — I48.0 PAROXYSMAL ATRIAL FIBRILLATION (H): ICD-10-CM

## 2022-02-18 DIAGNOSIS — Z79.01 LONG TERM CURRENT USE OF ANTICOAGULANT THERAPY: Primary | ICD-10-CM

## 2022-02-18 DIAGNOSIS — I48.91 ATRIAL FIBRILLATION (H): ICD-10-CM

## 2022-02-18 LAB — INR (EXTERNAL): 2.2 (ref 0.9–1.1)

## 2022-02-18 NOTE — PROGRESS NOTES
ANTICOAGULATION  MANAGEMENT-Home Monitor Managed by Exception    Haresh EMILIE Rock 71 year old male is on warfarin with therapeutic INR result. (Goal INR 2.0-3.0)    Recent labs: (last 7 days)     02/18/22  0000   INR 2.2*         Previous INR was Therapeutic    Medication, diet, health changes since last INR:chart reviewed; none identified    Contacted within the last 12 weeks by phone on 1/28/2022      MINNIE Hutson was NOT contacted regarding therapeutic result today per home monitoring policy manage by exception agreement.   Current warfarin dose is to be continued:     Summary  As of 2/18/2022    Full warfarin instructions:  5 mg every Fri; 3.75 mg all other days   Next INR check:  2/25/2022           ?   Mayi Ayala RN  Anticoagulation Clinic  2/18/2022    _______________________________________________________________________     Anticoagulation Episode Summary     Current INR goal:  2.0-3.0   TTR:  63.8 % (11.7 mo)   Target end date:  Indefinite   Send INR reminders to:  CHELSEA CHILEL    Indications    Long-term (current) use of anticoagulants [Z79.01] [Z79.01]  Atrial fibrillation (H) [I48.91]  Antiphospholipid antibody syndrome (H) (Resolved) [D68.61]  Personal history of DVT (deep vein thrombosis) (Resolved) [Z86.718]  Atrial fibrillation  unspecified type (H) (Resolved) [I48.91]  Paroxysmal atrial fibrillation (H) [I48.0]           Comments:  JESSICA rodas to manage by exception         Anticoagulation Care Providers     Provider Role Specialty Phone number    Anya Nowak MD Referring Family Medicine [phone]

## 2022-02-25 ENCOUNTER — TRANSFERRED RECORDS (OUTPATIENT)
Dept: HEALTH INFORMATION MANAGEMENT | Facility: CLINIC | Age: 72
End: 2022-02-25
Payer: COMMERCIAL

## 2022-02-25 ENCOUNTER — DOCUMENTATION ONLY (OUTPATIENT)
Dept: ANTICOAGULATION | Facility: CLINIC | Age: 72
End: 2022-02-25
Payer: COMMERCIAL

## 2022-02-25 DIAGNOSIS — I48.91 ATRIAL FIBRILLATION (H): ICD-10-CM

## 2022-02-25 DIAGNOSIS — Z79.01 LONG TERM CURRENT USE OF ANTICOAGULANT THERAPY: Primary | ICD-10-CM

## 2022-02-25 DIAGNOSIS — I48.0 PAROXYSMAL ATRIAL FIBRILLATION (H): ICD-10-CM

## 2022-02-25 LAB — INR (EXTERNAL): 2.5 (ref 0.9–1.1)

## 2022-02-25 NOTE — PROGRESS NOTES
ANTICOAGULATION  MANAGEMENT-Home Monitor Managed by Exception    Haresh EMILIE Rock 71 year old male is on warfarin with therapeutic INR result. (Goal INR 2.0-3.0)    Recent labs: (last 7 days)     02/25/22  0000   INR 2.5*         Previous INR was Therapeutic    Medication, diet, health changes since last INR:chart reviewed; none identified    Contacted within the last 12 weeks by phone on 1/28/2022      MINNIE Hutson was NOT contacted regarding therapeutic result today per home monitoring policy manage by exception agreement.   Current warfarin dose is to be continued:     Summary  As of 2/25/2022    Full warfarin instructions:  5 mg every Fri; 3.75 mg all other days   Next INR check:  3/4/2022           ?   Mayi Ayala RN  Anticoagulation Clinic  2/25/2022    _______________________________________________________________________     Anticoagulation Episode Summary     Current INR goal:  2.0-3.0   TTR:  65.7 % (11.7 mo)   Target end date:  Indefinite   Send INR reminders to:  CHELSEA CHILEL    Indications    Long-term (current) use of anticoagulants [Z79.01] [Z79.01]  Atrial fibrillation (H) [I48.91]  Antiphospholipid antibody syndrome (H) (Resolved) [D68.61]  Personal history of DVT (deep vein thrombosis) (Resolved) [Z86.718]  Atrial fibrillation  unspecified type (H) (Resolved) [I48.91]  Paroxysmal atrial fibrillation (H) [I48.0]           Comments:  JESSICA rodas to manage by exception         Anticoagulation Care Providers     Provider Role Specialty Phone number    Anya Nowak MD Referring Family Medicine [phone]

## 2022-03-02 NOTE — PROGRESS NOTES
This is a recent snapshot of the patient's [facility] medical record.  For current drug dose and complete information and questions, call [phone] or In Basket pool, [facility] home infusion ([id number])  CSN Number:  [id number]

## 2022-03-04 ENCOUNTER — DOCUMENTATION ONLY (OUTPATIENT)
Dept: FAMILY MEDICINE | Facility: CLINIC | Age: 72
End: 2022-03-04
Payer: COMMERCIAL

## 2022-03-04 ENCOUNTER — TRANSFERRED RECORDS (OUTPATIENT)
Dept: HEALTH INFORMATION MANAGEMENT | Facility: CLINIC | Age: 72
End: 2022-03-04
Payer: COMMERCIAL

## 2022-03-04 DIAGNOSIS — Z79.01 LONG TERM CURRENT USE OF ANTICOAGULANT THERAPY: Primary | ICD-10-CM

## 2022-03-04 DIAGNOSIS — I48.0 PAROXYSMAL ATRIAL FIBRILLATION (H): ICD-10-CM

## 2022-03-04 LAB — INR (EXTERNAL): 2.1 (ref 0.9–1.1)

## 2022-03-04 NOTE — PROGRESS NOTES
ANTICOAGULATION  MANAGEMENT-Home Monitor Managed by Exception    Hareshroger Rock 71 year old male is on warfarin with therapeutic INR result. (Goal INR 2.0-3.0)    Recent labs: (last 7 days)     03/04/22  0000   INR 2.1*         Previous INR was Therapeutic    Medication, diet, health changes since last INR:chart reviewed; none identified    Contacted within the last 12 weeks by phone on 1/28/22      MINNIE Hutson was NOT contacted regarding therapeutic result today per home monitoring policy manage by exception agreement.   Current warfarin dose is to be continued:     Summary  As of 3/4/2022    Full warfarin instructions:  5 mg every Fri; 3.75 mg all other days   Next INR check:  3/11/2022           ?   Allyn Doe RN  Anticoagulation Clinic  3/4/2022    _______________________________________________________________________     Anticoagulation Episode Summary     Current INR goal:  2.0-3.0   TTR:  67.7 % (11.7 mo)   Target end date:  Indefinite   Send INR reminders to:  CHELSEA CHILEL    Indications    Long-term (current) use of anticoagulants [Z79.01] [Z79.01]  Atrial fibrillation (H) [I48.91]  Antiphospholipid antibody syndrome (H) (Resolved) [D68.61]  Personal history of DVT (deep vein thrombosis) (Resolved) [Z86.718]  Atrial fibrillation  unspecified type (H) (Resolved) [I48.91]  Paroxysmal atrial fibrillation (H) [I48.0]           Comments:  JESSICA rodas to manage by exception         Anticoagulation Care Providers     Provider Role Specialty Phone number    Anya Nowak MD Referring Family Medicine [phone]

## 2022-03-08 NOTE — PROGRESS NOTES
Diagnosis/Summary/Recommendations:    PATIENT: Haresh Rock  71 year old male     : 1950    KEN: 2022    MRN: [id number]    [address] [place] [id number] [phone] (M)  [phone] (H)  [email or web address]    - shyela Ramirez - son  Gemma - daughter  Sheyla Rock  [phone]     [email or web address]  [email or web address]     [phone]     Sitting in a lazy boy     [phone]     Assessment:    (G20) Parkinson disease (H)  (primary encounter diagnosis)  Dopamine scan - (datscan)  11/3/2020  A presynaptic dopaminergic deficit is demonstrated bilaterally.     Review of diagnosis  parkinson  Avoidance of dopamine blockers yes  Motor complication review  -   Review of Impulse control disorders no  Review of surgical or medication options yes    Review of diagnosis    parkinson    Avoidance of dopamine blockers   no    Motor complication review   No     Review of Impulse control disorders   denies    Review of surgical or medication options   no    Gait/Balance/Falls   No falls    Exercise/Therapy performed/offered   Walked around the block yesterday     Cognitive/Driving   Driving - not problems  No cognitive problems     Mood      Daughter with bipolar  Son with mood issues  He has some depression     Hallucinations/delusions   No hallucinations     Sleep  Pseudoeph-doxylamine DM APAP nyquil - not using  Sleep pretty well  Nocturia 2/noc  Falls asleep during the day  Napping during the day  Falls asleep when watching news  Falls asleep during the afternoon  Up 3/noc for bathroom   Midnight goes to bed and up at 8am  Naps during day   Not talking in sleep or snoring.  No unusual behaviors  Wakes up before 3am, 5/6a and 7am    Bladder  Renal function.   May have stage 2 disease.      Prostate - denies problems. He has has nocturia couple times per night.   Denies elevated psa     Prostate surgery; urinating better     Dutasteride avodart 0.5mg - off   Tamsulosin flomax  0.4mg - stopped  Had low blood pressure and fluids     July 5th ED visit and got catheter  Surgery yesterday laser and should get catheter out  Dr Jose G Hawley     GI/Constipation/GERD  Swallow study 9/22/2020  1. Multiple events of deep laryngeal penetration with thinner barium consistencies with intermittent aspiration.      Gallstones but has not gallbladder removed.  Had a gallbladder attack x 1 and is not on actigall     Bisacodyl dulcolax 5mg EC tablet - not using   Calcium carbonate tums 500mg ?  Nutritional supplement - 1 bottle per day  - not using   Pantoprazole protonix 20mg - not using  Polythyelene glycol miralax - not using   Prevident 5000 dry mouth - using  Senna-docusate senexon-S senokot-S  - no  Weight loss better with feeding tube  No constipation  Has some seepage  When urinates he has some stool    Had bloating and blockage with the feeding tube placement and had emergency surgery   10/29/2021 Scan showed problems     ENDO  Cholesterol  Rosuvastatin crestor 40mg   Vitamin D3 cholecalciferol 50 mcg 2000 units  Had diabetes in the past from prednisone  And this may have resolved  May have a thyroid nodule.  TSH was normal.   A1c was 5.5      Cardio/heart  Flecainide tambocor 50mg twice daily  Metoprolol succinate ER Toprol XL 25mg 24 hr tablet  Blood pressure has been okay   122/63  Low bp has cleared up  MRI of heart next week     Vision  Wears glasses  Eye - left eye has been poor since age 2 due to a lazy eye - amblyopia  Right eye post cataract surgery had an artificial lens put in and does not wear glasses till the evening      Heme   Folic acid folvite 1mg - not taking   Anticoagulated  Warfarin     Hereditary hemochromatosis gene - gene that increases his risk and has not had problems with this. Had had phlebotomies x 2 in the past.      AYUSH Torres     History of pulmonary embolii - back in 2004. This was related to blood clotting problems.   May have had a DVT. Reportedly has had  antiphospholipid antibody. He is on coumadin.      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 Had a transplant from Dr. 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  No problems since then.      Graft vs host     He has high sed rate and will be seeing ID next week   crp was 12.8  Sed rate was 90  8/3/2021  Working with Marceol Jacques     Other:     Hodgkin's lymphoma in the past 2011 and had chemotherapy for this and followed with CT scan and reportedly this is gone.      Pulmonary hypertension in the past.   Fluticasone flonase 50 mcg/act nasal spray  Loratadine claritin 10mg    CT of lung next      msk - bilateral knee replacements.     Hearing. Feels he has wax in his left ear and partial problem with the right ear. It is variable problem. Has used ear wax removal.      He reports a neuropathy and that his foot drop has resolved. 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      Medications      8a  2p  3p 8p  9p   Acetaminophen tylenol 500mg no           Amoxicillin amoxil 500mg dentist           Calcium carbonate tums 500mg no           Carbidopa/levodopa Sinemet 25/100 1   1   1     Dutasteride avodart 0.5mg no           Flecainide tambocor 50mg 1      1    Fluticasone flonase 50 mcg/act spray prn           Loratadine claritin 10mg  1           Metoprolol succinate ER Toprol XL 25mg 24 hr         1    MVI centrum 1           Nonformulary feeding tube osmolyte  2 cans  2 cans  2 cans   Omeprazole prilosec 20mg 1        Prevident 5000 dry mouth using           Pseudoeph-doxylamine DM APAP nyquil no           Rosuvastatin crestor 40mg        1    Senna-docusate 8.6-50 no           Starch-maltodextrin thick-it no           Vitamin D3 cholecalciferol 50 mcg 2000 units gel  1           Warfarin anticoagulant coumadin 2.5mg       schedule                      Plan:    Feeding tube has helped him gain 20 lbs - he gets feedings 1 hour after medications  so 9am, 3pm and 9pm    Takes parkinson medications by mouth for the first two doses and crush the last one of the day through the feeding tube  Schedule is 8am, 2pm and 8pm    With his fecal seepage discussed the use of a bidet or adapting his toilet with one - h e has a raised toilet seat so not clear what needs to be done. Consider seeing Occupational therapy.     Return back     Coding statement:   Medical Decision Making:  #  Chronic progressive medical conditions addressed  - see above --   Review and/or interpretation of unique test or documentation from a provider outside of neurology no   Independent historian provided additional details  yes I  Prescription drug management and review of potential side effects and/or monitoring for side effects  -- see above ---  Health impacted by social determinants of health  no    I have reviewed the note as documented above.  This accurately captures the substance of my conversation with the patient and total time spent preparing for visit, executing visit and completing visit on the day of the visit:  40 minutes.  The portion of this total time included face to face time 2-235p    Ángel Hill MD     ______________________________________    Last visit date and details:             ______________________________________      Patient was asked about 14 Review of systems including changes in vision (dry eyes, double vision), hearing, heart, lungs, musculoskeletal, depression, anxiety, snoring, RBD, insomnia, urinary frequency, urinary urgency, constipation, swallowing problems, hematological, ID, allergies, skin problems: seborrhea, endocrinological: thyroid, diabetes, cholesterol; balance, weight changes, and other neurological problems and these were not significant at this time except for   Patient Active Problem List   Diagnosis     Hemochromatosis     Heekg-bynvvo-cqmz disease, chronic (H)     Advanced directives, counseling/discussion     Polyneuropathy     Status  post total knee replacements     Status post bone marrow transplant (H)     Hyperlipidemia with target LDL less than 100     Atrial fibrillation (H)     Chronic rhinitis     Gallstones without obstruction of gallbladder     Long-term (current) use of anticoagulants [Z79.01]     Thyroid nodule     Type 2 diabetes mellitus with stage 2 chronic kidney disease, without long-term current use of insulin (H)     History of Hodgkin's lymphoma     History of irradiation, presenting hazards to health     Primary pulmonary hypertension (H)     Hereditary hemochromatosis (H)     Oropharyngeal dysphagia     Articulation disorder     Personal history of PE (pulmonary embolism)     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)     Abnormal dopamine scan (DaTSCAN) 2020     ACP (advance care planning)     Thrombocytopenia (H)     Paroxysmal atrial fibrillation (H)     Benign prostatic hyperplasia with urinary retention     Constipation     Parkinson's disease (H)     Gastrostomy tube in place (H)     Severe protein-calorie malnutrition (H)          Allergies   Allergen Reactions     Seasonal Allergies      Past Surgical History:   Procedure Laterality Date     ANESTHESIA CARDIOVERSION  04/21/2011    Procedure:ANESTHESIA CARDIOVERSION; Surgeon:GENERIC ANESTHESIA PROVIDER; Location:[facility] OR     ARTHROSCOPY KNEE RT/LT      LT     CATARACT IOL, RT/LT  2017     COLONOSCOPY  10/16/2012    Procedure: COLONOSCOPY;  Colonoscopy, screening;  Surgeon: Reg Feliciano MD;  Location: [facility] OR     COLONOSCOPY N/A 04/14/2021    Procedure: COLONOSCOPY;  Surgeon: Reg Holm MD;  Location: [facility] GI     ESOPHAGOSCOPY, GASTROSCOPY, DUODENOSCOPY (EGD), COMBINED N/A 10/07/2020    Procedure: ESOPHAGOGASTRODUODENOSCOPY, WITH BIOPSY;  Surgeon: Raul Sawyer DO;  Location[facility]     H ABLATION FOCAL AFIB  03/05/2013    PVI     H ABLATION FOCAL AFIB  01/01/2018     HC BONE MARROW/STEM TRANSPLANT ALLOGENIC  05/08/2007     INSERT PORT VASCULAR ACCESS        IR GASTROSTOMY TUBE PERCUTANEOUS PLCMNT  10/25/2021     JOINT REPLACEMTN, KNEE RT/LT  03/28/2012    SHAWN     LAPAROSCOPY DIAGNOSTIC (GENERAL) N/A 10/29/2021    Procedure: LAPAROSCOPY, DIAGNOSTIC, TAKEDOWN OF MALPOSITIONED GASTROSTOMY TUBE, INSERTION OF GASTROSTOMY TUBE, SMALL BOWEL RESECTION X1, PRIMARY REPAIR OF SMALL BOWEL ENTEROTOMY, ABDOMINAL WASHOUT, TAP BLOCK;  Surgeon: Leif Finney DO;  Location: [facility] OR     PEG TUBE PLACEMENT  10/25/2021     VASECTOMY  1990     Past Medical History:   Diagnosis Date     Abnormal dopamine scan (DaTSCAN) 2020 11/04/2020    [phone] 11/3/2020   Narrative & Impression   Examination: Nuclear medicine DATscan for Dopamine Receptor Localization.   Examination: NM Brain Image Tomographic (SPECT) DATscan   Date: 11/3/2020 3:21 PM   Indication: Parkinsonism   Comparison: None   Additional Information: none   Interfering Medications: None   Technique:   The patient initially received 1 ml of Lugol's solution orally prior     Antiphospholipid antibody syndrome (H)     Ravi's, noted negative per testing re-done in 2008 in hematology note 01/13/2009     Articulation disorder 09/23/2020     Atrial fibrillation (H) 04/2011     Benign prostatic hyperplasia with urinary retention      Cirrhosis (H)      CKD (chronic kidney disease) stage 2, GFR 60-89 ml/min      Diabetes mellitus type II     steroid induced     DJD (degenerative joint disease) of knee     SHAWN, worse on right     DVT (deep venous thrombosis) (H)      ED (erectile dysfunction)      Gallbladder polyp 05/2010     Vcken-Jnsuyz-Escn Disease 2007    BMT     Hemochromatosis      Hodgkin's disease NOS     5 cycles of ABVD in 2011     HTN (hypertension)      Hyperlipidaemia LDL goal <100      Multiple thyroid nodules     benign      Myeloproliferative disorder (H)     atypical, [facility]     Parkinson disease (H) 09/24/2020     PE (pulmonary embolism) 11/2004     Polyneuropathy      Pressure ulcer      Primary  pulmonary hypertension (H)      Severe protein-calorie malnutrition (H) 11/10/2021     Small bowel obstruction (H) 10/29/2021     Thrombocytopenia (H) 06/08/2021     Social History     Socioeconomic History     Marital status:      Spouse name: Angelica     Number of children: 2     Years of education: 21     Highest education level: Not on file   Occupational History     Occupation:      Employer: [employer]     Employer: DISABLED   Tobacco Use     Smoking status: Never Smoker     Smokeless tobacco: Never Used   Substance and Sexual Activity     Alcohol use: No     Drug use: No     Sexual activity: Not Currently     Partners: Female   Other Topics Concern     Parent/sibling w/ CABG, MI or angioplasty before 65F 55M? No   Social History Narrative     about 50 yrs        Wife has some health issues - back pain - second knee replaced    She had gastric bypass and a fib and bad mitral valve        Son in [place]    Daughter in [facility] with 8 yo son.         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[employer]        Born and raised in [place]              Social Determinants of Health     Financial Resource Strain: Not on file   Food Insecurity: Not on file   Transportation Needs: Not on file   Physical Activity: Not on file   Stress: Not on file   Social Connections: Not on file   Intimate Partner Violence: Not on file   Housing Stability: Not on file       Drug and lactation database from the [place] National Library of Medicine:  http://toxnet.nlm.nih.gov/cgi-bin/sis/htmlgen?LACT      B/P: Data Unavailable, T: Data Unavailable, P: Data Unavailable, R: Data Unavailable 0 lbs 0 oz  There were no vitals taken for this visit., There is no height or weight on file to calculate BMI.  Medications and Vitals not listed above were documented in the cart and reviewed by me.     Current Outpatient Medications   Medication Sig Dispense Refill     carbidopa-levodopa (SINEMET)   MG tablet Take 1 tablet by mouth 3 times daily 270 tablet 3     flecainide (TAMBOCOR) 50 MG tablet TAKE 1 TABLET BY MOUTH TWICE A  tablet 0     fluticasone (FLONASE) 50 MCG/ACT nasal spray Spray 1 spray into both nostrils daily as needed for rhinitis 16 g 11     loratadine (CLARITIN REDITABS) 10 MG ODT Take 10 mg by mouth daily        Metoprolol Succinate (KAPSPARGO) 25 MG CS24 1 capsule by Tube route daily . Open capsule and add contents to an all plastic oral tip syringe; add 15 mL of water. Gently shake the syringe for ~10 seconds. Immediately deliver mixture through tube. No granules should remain in the syringe; rinse syringe with additional water if necessary. 90 capsule 3     Multiple Vitamins-Minerals (MULTIVITAMIN GUMMIES ADULT PO) Take 1 each by mouth daily       omeprazole (PRILOSEC) 20 MG DR capsule Take 1 capsule (20 mg) by mouth every morning 90 capsule 3     PREVIDENT 5000 DRY MOUTH 1.1 % GEL topical gel Apply to affected area daily   3     rosuvastatin (CRESTOR) 40 MG tablet Take 1 tablet (40 mg) by mouth At Bedtime 90 tablet 1     Starch-Maltodextrin (THICK-IT PO) Mixes with mountain dew code red to take pills with.       vitamin D3 (CHOLECALCIFEROL) 50 mcg (2000 units) tablet Take 1 tablet (50 mcg) by mouth daily       warfarin ANTICOAGULANT (COUMADIN) 2.5 MG tablet Take 5mg Friday & 3.75mg all the other days or as directed by [facility] 180 tablet 1         Medications                                                                                                                                                  Ángel Hill MD

## 2022-03-11 ENCOUNTER — DOCUMENTATION ONLY (OUTPATIENT)
Dept: ANTICOAGULATION | Facility: CLINIC | Age: 72
End: 2022-03-11
Payer: COMMERCIAL

## 2022-03-11 ENCOUNTER — TRANSFERRED RECORDS (OUTPATIENT)
Dept: HEALTH INFORMATION MANAGEMENT | Facility: CLINIC | Age: 72
End: 2022-03-11
Payer: COMMERCIAL

## 2022-03-11 DIAGNOSIS — I48.91 ATRIAL FIBRILLATION (H): ICD-10-CM

## 2022-03-11 DIAGNOSIS — Z79.01 LONG TERM CURRENT USE OF ANTICOAGULANT THERAPY: Primary | ICD-10-CM

## 2022-03-11 DIAGNOSIS — I48.0 PAROXYSMAL ATRIAL FIBRILLATION (H): ICD-10-CM

## 2022-03-11 LAB — INR (EXTERNAL): 2 (ref 0.9–1.1)

## 2022-03-11 NOTE — PROGRESS NOTES
ANTICOAGULATION  MANAGEMENT-Home Monitor Managed by Exception    Haresh Rock 71 year old male is on warfarin with therapeutic INR result. (Goal INR 2.0-3.0)    Recent labs: (last 7 days)     03/11/22  1503   INR 2.0*         Previous INR was Therapeutic    Medication, diet, health changes since last INR:chart reviewed; none identified    Contacted within the last 12 weeks by phone on 1/28/22      MINNIE Hutson was NOT contacted regarding therapeutic result today per home monitoring policy manage by exception agreement.   Current warfarin dose is to be continued:     Summary  As of 3/11/2022    Full warfarin instructions:  5 mg every Fri; 3.75 mg all other days   Next INR check:  3/18/2022           ?   Macrina Preston RN  Anticoagulation Clinic  3/11/2022    _______________________________________________________________________     Anticoagulation Episode Summary     Current INR goal:  2.0-3.0   TTR:  69.4 % (11.7 mo)   Target end date:  Indefinite   Send INR reminders to:  CHELSEA CHILEL    Indications    Long-term (current) use of anticoagulants [Z79.01] [Z79.01]  Atrial fibrillation (H) [I48.91]  Antiphospholipid antibody syndrome (H) (Resolved) [D68.61]  Personal history of DVT (deep vein thrombosis) (Resolved) [Z86.718]  Atrial fibrillation  unspecified type (H) (Resolved) [I48.91]  Paroxysmal atrial fibrillation (H) [I48.0]           Comments:  JESSICA rodas to manage by exception         Anticoagulation Care Providers     Provider Role Specialty Phone number    Anya Nowak MD Referring Family Medicine [phone]          Macrina Avila RN, BSN, PHN  Anticoagulation Nurse  [phone]

## 2022-03-15 ENCOUNTER — VIRTUAL VISIT (OUTPATIENT)
Dept: INFECTIOUS DISEASES | Facility: CLINIC | Age: 72
End: 2022-03-15
Attending: INTERNAL MEDICINE
Payer: COMMERCIAL

## 2022-03-15 ENCOUNTER — PATIENT OUTREACH (OUTPATIENT)
Dept: NURSING | Facility: CLINIC | Age: 72
End: 2022-03-15
Payer: COMMERCIAL

## 2022-03-15 DIAGNOSIS — R70.0 ELEVATED ERYTHROCYTE SEDIMENTATION RATE: Primary | ICD-10-CM

## 2022-03-15 PROCEDURE — 99214 OFFICE O/P EST MOD 30 MIN: CPT | Mod: 95 | Performed by: INTERNAL MEDICINE

## 2022-03-15 ASSESSMENT — ACTIVITIES OF DAILY LIVING (ADL): DEPENDENT_IADLS:: INDEPENDENT

## 2022-03-15 NOTE — PROGRESS NOTES
Clinic Care Coordination Contact    Follow Up Progress Note      Assessment: The RN CC nurse care coordinator contacted the patient by phone for a follow-up call.  The patient stated that he is doing very well.  The patient denies any pain, and has been walking without his walker every day.  Currently he is walking inside the house as there is too much ice outside.  The patient states that he is currently driving and doing fine.  There are no questions regarding his medications, and the patient did explain that he has switched from an oral dissolving tablet to a tablet that can be crushed with his other pills.    Medication review was completed with the patient and marked as reviewed.  Health maintenance was reviewed and questions were answered with the patient.  The assessment for CKD was completed with the patient today as well as the social determinants of health and they were marked as reviewed.      Care Gaps:    Health Maintenance Due   Topic Date Due     ZOSTER IMMUNIZATION (2 of 3) 11/14/2016     DIABETIC FOOT EXAM  04/23/2020     EYE EXAM  01/01/2022     COVID-19 Vaccine (4 - Booster for Moderna series) 01/20/2022     CBC  03/20/2022       Care Gaps Last addressed on 3/15/2022, Care Gap Goal set: Yes and Patient accepted scheduling phone number for [phone]  to schedule independently     Goals addressed this encounter:   Goals Addressed                    This Visit's Progress       #1  Other - Care Gaps (pt-stated)   70%      Goal Statement: I will work with my PCP to close my care gaps by scheduling an eye exam, and scheduling a diabetic foot exam.  Date Goal set: 12/13/21  Barriers: The patient suffers from Parkinson.  Strengths: engaged in care coordination  Date to Achieve By: 6/13/22  Patient expressed understanding of goal: yes  Action steps to achieve this goal:  1. I will speak with my PCP about scheduling a diabetic foot exam.  2. I will make an appointment for an annual eye exam.  3. I will  ask my PCP about which immunization are appropriate for me.           #2  Functional (pt-stated)   70%      Goal Statement: I will work on walking without the walker, and increasing my strength and stamina.  Date Goal set: 12/13/21  Barriers: suffers from parkinson's  Strengths: engaged in care coordination  Date to Achieve By: 6/13/22  Patient expressed understanding of goal: yes  Action steps to achieve this goal:  1. I will practice in the house walking without the walker. No longer using the walker as of 3/15/22.  2. I will go outside without the walker when I feel strong enough and have increased my strength and stamina.  3. I will frequently walk around inside the house to increase my strength and stamina.              Intervention/Education provided during outreach: The care gaps were discussed and the patient has completed his eye exam at this time.  He will use a care message and notify his PCP that he has completed his eye exam.     Outreach Frequency: monthly    Plan:   1.  The patient will continue to work on walking around inside until it is not icy outside to increase his strength and stamina.  2.  The patient will walk without the walker when he is comfortable with that he has been doing it for quite a while now.  3.  The patient will talk with his PCP regarding the immunizations and other items that are on his care gaps list.    The CHW will contact the patient on a monthly basis to check with him regarding the goals above.  The patient no longer needs to use his walker only as needed.  The CHW will reach out to the RN CC should there be any questions at all.    RN CC Nurse Care Coordinator will follow up in 6 weeks.  CHW will follow up in 4 weeks.        Uriel Zhou MSN, RN, PHN, CCM   Primary Care Clinical RN Care Coordinator  [facility]  3/15/2022   9:20 AM  [email or web address]  Office: [phone]

## 2022-03-15 NOTE — LETTER
3/15/2022       RE: Haresh Rock  [address] [place] [address]     Dear Colleague,    Thank you for referring your patient, Haresh Rock, to the [facility]FECT[facility] DISEASE [facility] [place] at [facility]. Please see a copy of my visit note below.      Haresh Rock is a 71 year old man who is being evaluated via a billable video visit.      How would you like to obtain your AVS? MyChart  If the video visit is dropped, the invitation should be resent by: Text to cell phone: [phone]  Will anyone else be joining your video visit? No         Reason for visit:   Infectious Disease Return Visit, planned follow-up for elevated ESR     SUBJECTIVE and Interval History:     Haresh is once again in very good spirits.  He has now gained a total of 15-20 lbs back and the tube feedings are going very well.  He tells me that he weighs 153 lbs now, and this is just great.    Last labs were done 12/20/2021 and CRP was still down 3.1, but ESR was back up to 99.   Since this did not correlate with any clinical change and Haresh was doing so well, nothing further was done at the time.     CT Chest was repeated 1/12/2022 which demonstrated resolution of previously seen ground glass opacities, with only pulmonary scarring noted.    Haresh has been staying home and there have not been any COVID concerns for him or his family in the past few months.  They got through this past surge.   Haresh is happy to share that he has gone out regularly to shovel the snow off the walk with these frequent 1 inch drops.  He has a , but will just scrape the sidewalk when we get these smaller accumulations.  He is glad to have the energy and strength to do this again.    Does not complain of fevers or any new concerns.     I have reviewed and updated the patient's Past Medical History, Social History, Family History and Medication List.     OBJECTIVE:     Current Outpatient  Medications   Medication     carbidopa-levodopa (SINEMET)  MG tablet     flecainide (TAMBOCOR) 50 MG tablet     fluticasone (FLONASE) 50 MCG/ACT nasal spray     loratadine (CLARITIN REDITABS) 10 MG ODT     Metoprolol Succinate (KAPSPARGO) 25 MG CS24     Multiple Vitamins-Minerals (MULTIVITAMIN GUMMIES ADULT PO)     omeprazole (PRILOSEC) 20 MG DR capsule     PREVIDENT 5000 DRY MOUTH 1.1 % GEL topical gel     rosuvastatin (CRESTOR) 40 MG tablet     Starch-Maltodextrin (THICK-IT PO)     vitamin D3 (CHOLECALCIFEROL) 50 mcg (2000 units) tablet     warfarin ANTICOAGULANT (COUMADIN) 2.5 MG tablet     Current Facility-Administered Medications   Medication     ondansetron (ZOFRAN) injection 4 mg             Allergies   Allergen Reactions     Seasonal Allergies           Exam via video visit:     GENERAL: Patient looks comparatively well, and is not in any acute distress    HEENT: The eyes do not appear to have any icterus or injection.  EOM appear intact.  RESPIRATORY:  No cough or labored breathing is noted.  SKIN:  No rashes are visible  NEURO:  Awake, alert, no focal neurologic deficits are noted.  PSYCH: Affect is bright. Speech fluent and appropriate.      The rest of a comprehensive physical examination is deferred due to the public health emergency video visit restrictions.     Labs  Reviewed extensively in Epic     12/20/2021  CRP 3.1  ESR 99    12/7/2021    CRP 19.5  ESR 21    11/1/2021 CRP  180 mg/dL  CBC with WBC of 10.7, platelets 98     10/14/2021 ESR  105     For comparison:  8/3/2021 with ESR of 90 and CRP of 12 mg/dL.   CBC with normal WBC and platelets of 147     Imaging  2/26/2021  PET with CT:  In this patient with history of Hodgkin lymphoma:  1. Nodular opacities in the left lower lobe with associated  endobronchial debris, also by basilar groundglass opacities which may  represent infection/inflammation versus aspiration, including Covid.  2. Mildly hypermetabolic left hilar and subcarinal lymph  node, favored  to be reactive.  3. No hypermetabolic lymphadenopathy in the abdomen or pelvis.  4. Hypermetabolic activity at the anorectal junction with diffuse wall  thickening on CT. Recommend direct visualization.  5. Punctate calcific density at the right UVJ without associated  hydroureter. This may represent recently passed renal stone versus  bladder calculus.  6. Additional incidental findings are stable, including cholelithiasis  without acute cholecystitis, diverticulosis without acute  diverticulitis, and prostatomegaly.     10/14/2021  CT Chest  IMPRESSION:   1. Decreased conspicuity of the left lower lobe predominant nodular  opacities, which now appear more groundglass and solid. This may  represents resolving inflammation or infection.  2. Stable sub-6 mm pulmonary nodules. These may be infectious,  inflammatory or neoplastic in etiology. Attention on follow-up.  3. Cholelithiasis without evidence of cholecystitis.    1/12/2022  CT Chest  Lungs:  No pleural effusion or pneumothorax. No focal consolidation. Linear  scarring in the lateral and posterior right upper lobe and lateral  right lower lobe. Resolved right perihilar and left lower lobe  groundglass opacities. Persistent ill-defined groundglass opacities in  the right lower lobe are favored to represent atelectasis. No  suspicious new or enlarging pulmonary nodule. Calcified granuloma in  the left upper and lower lobes. The central tracheobronchial tree is  clear. No bronchiectasis or bronchial wall thickening. Asymmetric  elevation of the right hemidiaphragm.     ASSESSMENT:     Referred for high ESR.  Ultimately attributed to chronic aspiration, but ESR did become elevated again.    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 He also has hereditary hemachromatosis with  cirrhosis, and a recent diagnosis of Parkinson's disease. He also has progressive weight loss over several months, but also has dysphagia related to the Parkinson's, and his diet was fairly limited before recent feeding tube placement.  Weight gain has been successful 15-20 lbs since starting tube feeds.     Upon extensive lab evaluation he was found to have an ESR of 92 on 2/15/21 and this had remained persistently elevated into 11/2021.   CRP also began to rising from 12 mg/dL in August to 180 mg/dL in October, but did downtrend after starting on tube feeds and was much lower 3.1 as of 12/20/2021.  However ESR did jump back up to 99 on that same check 12/20/2021.       PLAN:     - Expecting repeat labs in April with Dr. Miller.  Would trend both CRP and ESR at that time.    -Haresh is feeling well in great spirits, with no new concerns.  I am inclined not to pursue further evaluation at this time.  I will remain available if a new focal concern arises, or if Dr. Miller would like this re-visited.  In the meantime, I have not given Haresh a follow-up visit, but remain available.    It has been my pleasure to be involved in the care of Mr. Haresh Rock, and I wish him well.       Video-Visit Details    Type of service:  Video Visit    Video Start Time: 1:40 PM  Video End Time:  1:55 PM    Originating Location (pt. Location): Home    Distant Location (provider location):  [facility] INFECTI[facility] DISE[facility] CLIN[facility] [place]     Platform used for Video Visit: Doximity       MDM Level 4  1 problem not resolved, uncertain prognosis  Reviewed notes from 2 different providers, PCP Dayan and Pulmonologist Cheyanne  Reviewed labs x 2  Reviewed CT Chest      Sincerely,    Marcelo Jacques MD

## 2022-03-15 NOTE — PROGRESS NOTES
Haresh Rock is a 71 year old man who is being evaluated via a billable video visit.      How would you like to obtain your AVS? MyChart  If the video visit is dropped, the invitation should be resent by: Text to cell phone: [phone]  Will anyone else be joining your video visit? No         Reason for visit:   Infectious Disease Return Visit, planned follow-up for elevated ESR     SUBJECTIVE and Interval History:     Haresh is once again in very good spirits.  He has now gained a total of 15-20 lbs back and the tube feedings are going very well.  He tells me that he weighs 153 lbs now, and this is just great.    Last labs were done 12/20/2021 and CRP was still down 3.1, but ESR was back up to 99.   Since this did not correlate with any clinical change and Haresh was doing so well, nothing further was done at the time.     CT Chest was repeated 1/12/2022 which demonstrated resolution of previously seen ground glass opacities, with only pulmonary scarring noted.    Haresh has been staying home and there have not been any COVID concerns for him or his family in the past few months.  They got through this past surge.   Haresh is happy to share that he has gone out regularly to shovel the snow off the walk with these frequent 1 inch drops.  He has a , but will just scrape the sidewalk when we get these smaller accumulations.  He is glad to have the energy and strength to do this again.    Does not complain of fevers or any new concerns.     I have reviewed and updated the patient's Past Medical History, Social History, Family History and Medication List.     OBJECTIVE:     Current Outpatient Medications   Medication     carbidopa-levodopa (SINEMET)  MG tablet     flecainide (TAMBOCOR) 50 MG tablet     fluticasone (FLONASE) 50 MCG/ACT nasal spray     loratadine (CLARITIN REDITABS) 10 MG ODT     Metoprolol Succinate (KAPSPARGO) 25 MG CS24     Multiple Vitamins-Minerals (MULTIVITAMIN GUMMIES ADULT PO)      omeprazole (PRILOSEC) 20 MG DR capsule     PREVIDENT 5000 DRY MOUTH 1.1 % GEL topical gel     rosuvastatin (CRESTOR) 40 MG tablet     Starch-Maltodextrin (THICK-IT PO)     vitamin D3 (CHOLECALCIFEROL) 50 mcg (2000 units) tablet     warfarin ANTICOAGULANT (COUMADIN) 2.5 MG tablet     Current Facility-Administered Medications   Medication     ondansetron (ZOFRAN) injection 4 mg             Allergies   Allergen Reactions     Seasonal Allergies           Exam via video visit:     GENERAL: Patient looks comparatively well, and is not in any acute distress    HEENT: The eyes do not appear to have any icterus or injection.  EOM appear intact.  RESPIRATORY:  No cough or labored breathing is noted.  SKIN:  No rashes are visible  NEURO:  Awake, alert, no focal neurologic deficits are noted.  PSYCH: Affect is bright. Speech fluent and appropriate.      The rest of a comprehensive physical examination is deferred due to the public [facility] emergency video visit restrictions.     Labs  Reviewed extensively in Epic     12/20/2021  CRP 3.1  ESR 99    12/7/2021    CRP 19.5  ESR 21    11/1/2021 CRP  180 mg/dL  CBC with WBC of 10.7, platelets 98     10/14/2021 ESR  105     For comparison:  8/3/2021 with ESR of 90 and CRP of 12 mg/dL.   CBC with normal WBC and platelets of 147     Imaging  2/26/2021  PET with CT:  In this patient with history of Hodgkin lymphoma:  1. Nodular opacities in the left lower lobe with associated  endobronchial debris, also by basilar groundglass opacities which may  represent infection/inflammation versus aspiration, including Covid.  2. Mildly hypermetabolic left hilar and subcarinal lymph node, favored  to be reactive.  3. No hypermetabolic lymphadenopathy in the abdomen or pelvis.  4. Hypermetabolic activity at the anorectal junction with diffuse wall  thickening on CT. Recommend direct visualization.  5. Punctate calcific density at the right UVJ without associated  hydroureter. This may represent  recently passed renal stone versus  bladder calculus.  6. Additional incidental findings are stable, including cholelithiasis  without acute cholecystitis, diverticulosis without acute  diverticulitis, and prostatomegaly.     10/14/2021  CT Chest  IMPRESSION:   1. Decreased conspicuity of the left lower lobe predominant nodular  opacities, which now appear more groundglass and solid. This may  represents resolving inflammation or infection.  2. Stable sub-6 mm pulmonary nodules. These may be infectious,  inflammatory or neoplastic in etiology. Attention on follow-up.  3. Cholelithiasis without evidence of cholecystitis.    1/12/2022  CT Chest  Lungs:  No pleural effusion or pneumothorax. No focal consolidation. Linear  scarring in the lateral and posterior right upper lobe and lateral  right lower lobe. Resolved right perihilar and left lower lobe  groundglass opacities. Persistent ill-defined groundglass opacities in  the right lower lobe are favored to represent atelectasis. No  suspicious new or enlarging pulmonary nodule. Calcified granuloma in  the left upper and lower lobes. The central tracheobronchial tree is  clear. No bronchiectasis or bronchial wall thickening. Asymmetric  elevation of the right hemidiaphragm.     ASSESSMENT:     Referred for high ESR.  Ultimately attributed to chronic aspiration, but ESR did become elevated again.    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 He also has hereditary hemachromatosis with cirrhosis, and a recent diagnosis of Parkinson's disease. He also has progressive weight loss over several months, but also has dysphagia related to the Parkinson's, and his diet was fairly limited before recent feeding tube placement.  Weight gain has been successful 15-20 lbs since starting tube feeds.     Upon  extensive lab evaluation he was found to have an ESR of 92 on 2/15/21 and this had remained persistently elevated into 11/2021.   CRP also began to rising from 12 mg/dL in August to 180 mg/dL in October, but did downtrend after starting on tube feeds and was much lower 3.1 as of 12/20/2021.  However ESR did jump back up to 99 on that same check 12/20/2021.       PLAN:     - Expecting repeat labs in April with Dr. Miller.  Would trend both CRP and ESR at that time.    -Haresh is feeling well in great spirits, with no new concerns.  I am inclined not to pursue further evaluation at this time.  I will remain available if a new focal concern arises, or if Dr. Miller would like this re-visited.  In the meantime, I have not given Haresh a follow-up visit, but remain available.    It has been my pleasure to be involved in the care of Mr. Haresh Rock, and I wish him well.       Video-Visit Details    Type of service:  Video Visit    Video Start Time: 1:40 PM  Video End Time:  1:55 PM    Originating Location (pt. Location): Home    Distant Location (provider location):  [facility] INFECTIOUS DISEASE CLINIC [place]     Platform used for Video Visit: Doximity       MDM Level 4  1 problem not resolved, uncertain prognosis  Reviewed notes from 2 different providers, PCP Dayan and Pulmonologist Cheyanne  Reviewed labs x 2  Reviewed CT Chest

## 2022-03-16 DIAGNOSIS — Z79.01 LONG TERM CURRENT USE OF ANTICOAGULANT THERAPY: ICD-10-CM

## 2022-03-16 DIAGNOSIS — E83.110 HEREDITARY HEMOCHROMATOSIS (H): ICD-10-CM

## 2022-03-16 DIAGNOSIS — E55.9 VITAMIN D DEFICIENCY: ICD-10-CM

## 2022-03-16 DIAGNOSIS — G20.A1 PARKINSON'S DISEASE (H): Primary | ICD-10-CM

## 2022-03-16 DIAGNOSIS — Z85.71 HISTORY OF HODGKIN'S LYMPHOMA: ICD-10-CM

## 2022-03-17 ENCOUNTER — MEDICAL CORRESPONDENCE (OUTPATIENT)
Dept: HEALTH INFORMATION MANAGEMENT | Facility: CLINIC | Age: 72
End: 2022-03-17
Payer: COMMERCIAL

## 2022-03-18 ENCOUNTER — TRANSFERRED RECORDS (OUTPATIENT)
Dept: HEALTH INFORMATION MANAGEMENT | Facility: CLINIC | Age: 72
End: 2022-03-18
Payer: COMMERCIAL

## 2022-03-18 ENCOUNTER — DOCUMENTATION ONLY (OUTPATIENT)
Dept: FAMILY MEDICINE | Facility: CLINIC | Age: 72
End: 2022-03-18
Payer: COMMERCIAL

## 2022-03-18 DIAGNOSIS — I48.0 PAROXYSMAL ATRIAL FIBRILLATION (H): ICD-10-CM

## 2022-03-18 DIAGNOSIS — Z79.01 LONG TERM CURRENT USE OF ANTICOAGULANT THERAPY: Primary | ICD-10-CM

## 2022-03-18 LAB — INR (EXTERNAL): 2.1 (ref 0.9–1.1)

## 2022-03-18 NOTE — PROGRESS NOTES
ANTICOAGULATION  MANAGEMENT-Home Monitor Managed by Exception    Hareshroger Rock 71 year old male is on warfarin with therapeutic INR result. (Goal INR 2.0-3.0)    Recent labs: (last 7 days)     03/18/22  1015   INR 2.1*         Previous INR was Therapeutic    Medication, diet, health changes since last INR:chart reviewed; none identified    Contacted within the last 12 weeks by phone on 1/28/22      MINNIE Hutson was NOT contacted regarding therapeutic result today per home monitoring policy manage by exception agreement.   Current warfarin dose is to be continued:     Summary  As of 3/18/2022    Full warfarin instructions:  5 mg every Fri; 3.75 mg all other days   Next INR check:  3/25/2022           ?   Allyn Doe RN  Anticoagulation Clinic  3/18/2022    _______________________________________________________________________     Anticoagulation Episode Summary     Current INR goal:  2.0-3.0   TTR:  70.6 % (11.7 mo)   Target end date:  Indefinite   Send INR reminders to:  CHELSEA CHILEL    Indications    Long-term (current) use of anticoagulants [Z79.01] [Z79.01]  Atrial fibrillation (H) [I48.91]  Antiphospholipid antibody syndrome (H) (Resolved) [D68.61]  Personal history of DVT (deep vein thrombosis) (Resolved) [Z86.718]  Atrial fibrillation  unspecified type (H) (Resolved) [I48.91]  Paroxysmal atrial fibrillation (H) [I48.0]           Comments:  JESSICA rodas to manage by exception         Anticoagulation Care Providers     Provider Role Specialty Phone number    Anya Nowak MD Referring Family Medicine [phone]

## 2022-03-21 ENCOUNTER — OFFICE VISIT (OUTPATIENT)
Dept: NEUROLOGY | Facility: CLINIC | Age: 72
End: 2022-03-21
Attending: STUDENT IN AN ORGANIZED HEALTH CARE EDUCATION/TRAINING PROGRAM
Payer: COMMERCIAL

## 2022-03-21 ENCOUNTER — PRE VISIT (OUTPATIENT)
Dept: NEUROLOGY | Facility: CLINIC | Age: 72
End: 2022-03-21

## 2022-03-21 VITALS
DIASTOLIC BLOOD PRESSURE: 60 MMHG | OXYGEN SATURATION: 100 % | SYSTOLIC BLOOD PRESSURE: 102 MMHG | WEIGHT: 154 LBS | BODY MASS INDEX: 21.56 KG/M2 | HEIGHT: 71 IN | HEART RATE: 79 BPM

## 2022-03-21 DIAGNOSIS — G20.A1 PARKINSON'S DISEASE (H): ICD-10-CM

## 2022-03-21 DIAGNOSIS — G20.A1 PARKINSON DISEASE (H): Primary | ICD-10-CM

## 2022-03-21 PROCEDURE — 99215 OFFICE O/P EST HI 40 MIN: CPT | Performed by: PSYCHIATRY & NEUROLOGY

## 2022-03-21 RX ORDER — CARBIDOPA AND LEVODOPA 25; 100 MG/1; MG/1
TABLET ORAL
Qty: 270 TABLET | Refills: 3 | Status: SHIPPED | OUTPATIENT
Start: 2022-03-21 | End: 2023-01-09

## 2022-03-21 ASSESSMENT — PAIN SCALES - GENERAL: PAINLEVEL: NO PAIN (0)

## 2022-03-21 NOTE — PROGRESS NOTES
"Haresh Rock's goals for this visit include:   Chief Complaint   Patient presents with     RECHECK     Scheduled per patient/ referred by Dr. Leon/ Jessa       He requests these members of his care team be copied on today's visit information: yes    PCP: Anya Nowak    Referring Provider:  Javi Leon MD  [address] [place] [id number]    /60 (BP Location: Right arm, Patient Position: Sitting, Cuff Size: Adult Regular)   Pulse 79   Ht 1.803 m (5' 11\")   Wt 69.9 kg (154 lb)   SpO2 100%   BMI 21.48 kg/m      Do you need any medication refills at today's visit? yes  Lucien Harrison CMA      "

## 2022-03-21 NOTE — LETTER
3/21/2022         RE: Haresh Rock  [address] [place] [address]        Dear Colleague,    Thank you for referring your patient, Haresh Rock, to the [facility] [place]. Please see a copy of my visit note below.        Diagnosis/Summary/Recommendations:    PATIENT: Haresh Rock  71 year old male     : 1950    KEN: 2022    MRN: [id number]    [address] [place] [address]  [phone] (M)  [phone] (H)  [email or web address]    - sheyla Ramirez - son  Gemma - daughter  Sheyla Rock  [phone]     [email or web address],   [email or web address]  [email or web address]     [phone]     Sitting in a lazy boy     [phone]     Assessment:    (G20) Parkinson disease (H)  (primary encounter diagnosis)  Dopamine scan - (datscan)  11/3/2020  A presynaptic dopaminergic deficit is demonstrated bilaterally.     Review of diagnosis  parkinson  Avoidance of dopamine blockers yes  Motor complication review  -   Review of Impulse control disorders no  Review of surgical or medication options yes    Review of diagnosis    parkinson    Avoidance of dopamine blockers   no    Motor complication review   No     Review of Impulse control disorders   denies    Review of surgical or medication options   no    Gait/Balance/Falls   No falls    Exercise/Therapy performed/offered   Walked around the block yesterday     Cognitive/Driving   Driving - not problems  No cognitive problems     Mood      Daughter with bipolar  Son with mood issues  He has some depression     Hallucinations/delusions   No hallucinations     Sleep  Pseudoeph-doxylamine DM APAP nyquil - not using  Sleep pretty well  Nocturia 2/noc  Falls asleep during the day  Napping during the day  Falls asleep when watching news  Falls asleep during the afternoon  Up 3/noc for bathroom   Midnight goes to bed and up at 8am  Naps during day   Not talking in sleep or snoring.  No unusual behaviors  Wakes up before 3am, 5/6a and  7am    Bladder  Renal function.   May have stage 2 disease.      Prostate - denies problems. He has has nocturia couple times per night.   Denies elevated psa     Prostate surgery; urinating better     Dutasteride avodart 0.5mg - off   Tamsulosin flomax 0.4mg - stopped  Had low blood pressure and fluids     July 5th ED visit and got catheter  Surgery yesterday laser and should get catheter out  Dr Jose G Hawley     GI/Constipation/GERD  Swallow study 9/22/2020  1. Multiple events of deep laryngeal penetration with thinner barium consistencies with intermittent aspiration.      Gallstones but has not gallbladder removed.  Had a gallbladder attack x 1 and is not on actigall     Bisacodyl dulcolax 5mg EC tablet - not using   Calcium carbonate tums 500mg ?  Nutritional supplement - 1 bottle per day  - not using   Pantoprazole protonix 20mg - not using  Polythyelene glycol miralax - not using   Prevident 5000 dry mouth - using  Senna-docusate senexon-S senokot-S  - no  Weight loss better with feeding tube  No constipation  Has some seepage  When urinates he has some stool    Had bloating and blockage with the feeding tube placement and had emergency surgery   10/29/2021 Scan showed problems     ENDO  Cholesterol  Rosuvastatin crestor 40mg   Vitamin D3 cholecalciferol 50 mcg 2000 units  Had diabetes in the past from prednisone  And this may have resolved  May have a thyroid nodule.  TSH was normal.   A1c was 5.5      Cardio/heart  Flecainide tambocor 50mg twice daily  Metoprolol succinate ER Toprol XL 25mg 24 hr tablet  Blood pressure has been okay   122/63  Low bp has cleared up  MRI of heart next week     Vision  Wears glasses  Eye - left eye has been poor since age 2 due to a lazy eye - amblyopia  Right eye post cataract surgery had an artificial lens put in and does not wear glasses till the evening      Heme   Folic acid folvite 1mg - not taking   Anticoagulated  Warfarin     Hereditary hemochromatosis gene - gene  that increases his risk and has not had problems with this. Had had phlebotomies x 2 in the past.      AYUSH Torres     History of pulmonary embolii - back in 2004. This was related to blood clotting problems.   May have had a DVT. Reportedly has had antiphospholipid antibody. He is on coumadin.      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 Had a transplant from Dr. 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  No problems since then.      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      Pulmonary hypertension in the past.   Fluticasone flonase 50 mcg/act nasal spray  Loratadine claritin 10mg    CT of lung next      msk - bilateral knee replacements.     Hearing. Feels he has wax in his left ear and partial problem with the right ear. It is variable problem. Has used ear wax removal.      He reports a neuropathy and that his foot drop has resolved. 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      Medications      8a  2p  3p 8p  9p   Acetaminophen tylenol 500mg no           Amoxicillin amoxil 500mg dentist           Calcium carbonate tums 500mg no           Carbidopa/levodopa Sinemet 25/100 1   1   1     Dutasteride avodart 0.5mg no           Flecainide tambocor 50mg 1      1    Fluticasone flonase 50 mcg/act spray prn           Loratadine claritin 10mg  1           Metoprolol succinate ER Toprol XL 25mg 24 hr         1    MVI centrum 1           Nonformulary feeding tube osmolyte  2 cans  2 cans  2 cans   Omeprazole prilosec 20mg 1        Prevident 5000 dry mouth using           Pseudoeph-doxylamine DM APAP nyquil no           Rosuvastatin crestor 40mg        1    Senna-docusate 8.6-50 no           Starch-maltodextrin  thick-it no           Vitamin D3 cholecalciferol 50 mcg 2000 units gel  1           Warfarin anticoagulant coumadin 2.5mg       schedule                      Plan:    Feeding tube has helped him gain 20 lbs - he gets feedings 1 hour after medications so 9am, 3pm and 9pm    Takes parkinson medications by mouth for the first two doses and crush the last one of the day through the feeding tube  Schedule is 8am, 2pm and 8pm    With his fecal seepage discussed the use of a bidet or adapting his toilet with one - h e has a raised toilet seat so not clear what needs to be done. Consider seeing Occupational therapy.     Return back     Coding statement:   Medical Decision Making:  #  Chronic progressive medical conditions addressed  - see above --   Review and/or interpretation of unique test or documentation from a provider outside of neurology no   Independent historian provided additional details  yes I  Prescription drug management and review of potential side effects and/or monitoring for side effects  -- see above ---  Health impacted by social determinants of health  no    I have reviewed the note as documented above.  This accurately captures the substance of my conversation with the patient and total time spent preparing for visit, executing visit and completing visit on the day of the visit:  40 minutes.  The portion of this total time included face to face time 2-235p    Ángel Hill MD     ______________________________________    Last visit date and details:             ______________________________________      Patient was asked about 14 Review of systems including changes in vision (dry eyes, double vision), hearing, heart, lungs, musculoskeletal, depression, anxiety, snoring, RBD, insomnia, urinary frequency, urinary urgency, constipation, swallowing problems, hematological, ID, allergies, skin problems: seborrhea, endocrinological: thyroid, diabetes, cholesterol; balance, weight changes, and other  neurological problems and these were not significant at this time except for   Patient Active Problem List   Diagnosis     Hemochromatosis     Olrcp-fzhtsr-jgoz disease, chronic (H)     Advanced directives, counseling/discussion     Polyneuropathy     Status post total knee replacements     Status post bone marrow transplant (H)     Hyperlipidemia with target LDL less than 100     Atrial fibrillation (H)     Chronic rhinitis     Gallstones without obstruction of gallbladder     Long-term (current) use of anticoagulants [Z79.01]     Thyroid nodule     Type 2 diabetes mellitus with stage 2 chronic kidney disease, without long-term current use of insulin (H)     History of Hodgkin's lymphoma     History of irradiation, presenting hazards to health     Primary pulmonary hypertension (H)     Hereditary hemochromatosis (H)     Oropharyngeal dysphagia     Articulation disorder     Personal history of PE (pulmonary embolism)     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)     Abnormal dopamine scan (DaTSCAN) 2020     ACP (advance care planning)     Thrombocytopenia (H)     Paroxysmal atrial fibrillation (H)     Benign prostatic hyperplasia with urinary retention     Constipation     Parkinson's disease (H)     Gastrostomy tube in place (H)     Severe protein-calorie malnutrition (H)          Allergies   Allergen Reactions     Seasonal Allergies      Past Surgical History:   Procedure Laterality Date     ANESTHESIA CARDIOVERSION  04/21/2011    Procedure:ANESTHESIA CARDIOVERSION; Surgeon:GENERIC ANESTHESIA PROVIDER; Location:[facility] OR     ARTHROSCOPY KNEE RT/LT      LT     CATARACT IOL, RT/LT  2017     COLONOSCOPY  10/16/2012    Procedure: COLONOSCOPY;  Colonoscopy, screening;  Surgeon: Reg Feliciano MD;  Location:  OR     COLONOSCOPY N/A 04/14/2021    Procedure: COLONOSCOPY;  Surgeon: Reg Holm MD;  Location: [facility] GI     ESOPHAGOSCOPY, GASTROSCOPY, DUODENOSCOPY (EGD), COMBINED N/A 10/07/2020    Procedure:  ESOPHAGOGASTRODUODENOSCOPY, WITH BIOPSY;  Surgeon: Raul Sawyer DO;  Location: [facility] OR     H ABLATION FOCAL AFIB  03/05/2013    PVI     H ABLATION FOCAL AFIB  01/01/2018     HC BONE MARROW/STEM TRANSPLANT ALLOGENIC  05/08/2007     INSERT PORT VASCULAR ACCESS       IR GASTROSTOMY TUBE PERCUTANEOUS PLCMNT  10/25/2021     JOINT REPLACEMTN, KNEE RT/LT  03/28/2012    SHAWN     LAPAROSCOPY DIAGNOSTIC (GENERAL) N/A 10/29/2021    Procedure: LAPAROSCOPY, DIAGNOSTIC, TAKEDOWN OF MALPOSITIONED GASTROSTOMY TUBE, INSERTION OF GASTROSTOMY TUBE, SMALL BOWEL RESECTION X1, PRIMARY REPAIR OF SMALL BOWEL ENTEROTOMY, ABDOMINAL WASHOUT, TAP BLOCK;  Surgeon: Leif Finney DO;  Location: [facility] OR     PEG TUBE PLACEMENT  10/25/2021     VASECTOMY  1990     Past Medical History:   Diagnosis Date     Abnormal dopamine scan (DaTSCAN) 2020 11/04/2020    [phone] 11/3/2020   Narrative & Impression   Examination: Nuclear medicine DATscan for Dopamine Receptor Localization.   Examination: NM Brain Image Tomographic (SPECT) DATscan   Date: 11/3/2020 3:21 PM   Indication: Parkinsonism   Comparison: None   Additional Information: none   Interfering Medications: None   Technique:   The patient initially received 1 ml of Lugol's solution orally prior     Antiphospholipid antibody syndrome (H)     Ravi's, noted negative per testing re-done in 2008 in hematology note 01/13/2009     Articulation disorder 09/23/2020     Atrial fibrillation (H) 04/2011     Benign prostatic hyperplasia with urinary retention      Cirrhosis (H)      CKD (chronic kidney disease) stage 2, GFR 60-89 ml/min      Diabetes mellitus type II     steroid induced     DJD (degenerative joint disease) of knee     SHAWN, worse on right     DVT (deep venous thrombosis) (H)      ED (erectile dysfunction)      Gallbladder polyp 05/2010     Cpccm-Jchcza-Mjps Disease 2007    BMT     Hemochromatosis      Hodgkin's disease NOS     5 cycles of ABVD in 2011     HTN (hypertension)       Hyperlipidaemia LDL goal <100      Multiple thyroid nodules     benign      Myeloproliferative disorder (H)     atypical, [facility]     Parkinson disease (H) 09/24/2020     PE (pulmonary embolism) 11/2004     Polyneuropathy      Pressure ulcer      Primary pulmonary hypertension (H)      Severe protein-calorie malnutrition (H) 11/10/2021     Small bowel obstruction (H) 10/29/2021     Thrombocytopenia (H) 06/08/2021     Social History     Socioeconomic History     Marital status:      Spouse name: Angelica     Number of children: 2     Years of education: 21     Highest education level: Not on file   Occupational History     Occupation:      Employer: [employer]     Employer: [employer]   Tobacco Use     Smoking status: Never Smoker     Smokeless tobacco: Never Used   Substance and Sexual Activity     Alcohol use: No     Drug use: No     Sexual activity: Not Currently     Partners: Female   Other Topics Concern     Parent/sibling w/ CABG, MI or angioplasty before 65F 55M? No   Social History Narrative     about 50 yrs        Wife has some health issues - back pain - second knee replaced    She had gastric bypass and a fib and bad mitral valve        Son in [place]    Daughter in [facility] with 8 yo son.         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[employer]        Born and raised in [place]              Social Determinants of Health     Financial Resource Strain: Not on file   Food Insecurity: Not on file   Transportation Needs: Not on file   Physical Activity: Not on file   Stress: Not on file   Social Connections: Not on file   Intimate Partner Violence: Not on file   Housing Stability: Not on file       Drug and lactation database from the [place] National Library of Medicine:  http://toxnet.nlm.nih.gov/cgi-bin/sis/htmlgen?LACT      B/P: Data Unavailable, T: Data Unavailable, P: Data Unavailable, R: Data Unavailable 0 lbs 0 oz  There were no vitals taken for this  visit., There is no height or weight on file to calculate BMI.  Medications and Vitals not listed above were documented in the cart and reviewed by me.     Current Outpatient Medications   Medication Sig Dispense Refill     carbidopa-levodopa (SINEMET)  MG tablet Take 1 tablet by mouth 3 times daily 270 tablet 3     flecainide (TAMBOCOR) 50 MG tablet TAKE 1 TABLET BY MOUTH TWICE A  tablet 0     fluticasone (FLONASE) 50 MCG/ACT nasal spray Spray 1 spray into both nostrils daily as needed for rhinitis 16 g 11     loratadine (CLARITIN REDITABS) 10 MG ODT Take 10 mg by mouth daily        Metoprolol Succinate (KAPSPARGO) 25 MG CS24 1 capsule by Tube route daily . Open capsule and add contents to an all plastic oral tip syringe; add 15 mL of water. Gently shake the syringe for ~10 seconds. Immediately deliver mixture through tube. No granules should remain in the syringe; rinse syringe with additional water if necessary. 90 capsule 3     Multiple Vitamins-Minerals (MULTIVITAMIN GUMMIES ADULT PO) Take 1 each by mouth daily       omeprazole (PRILOSEC) 20 MG DR capsule Take 1 capsule (20 mg) by mouth every morning 90 capsule 3     PREVIDENT 5000 DRY MOUTH 1.1 % GEL topical gel Apply to affected area daily   3     rosuvastatin (CRESTOR) 40 MG tablet Take 1 tablet (40 mg) by mouth At Bedtime 90 tablet 1     Starch-Maltodextrin (THICK-IT PO) Mixes with mountain dew code red to take pills with.       vitamin D3 (CHOLECALCIFEROL) 50 mcg (2000 units) tablet Take 1 tablet (50 mcg) by mouth daily       warfarin ANTICOAGULANT (COUMADIN) 2.5 MG tablet Take 5mg Friday & 3.75mg all the other days or as directed by [facility] 180 tablet 1         Medications                                                                                                                                                  Ángel Rock's goals for this visit include:   Chief Complaint   Patient presents with     RECHECK      "Scheduled per patient/ referred by Dr. Leon/ Parkinson       He requests these members of his care team be copied on today's visit information: yes    PCP: Anya Nowak    Referring Provider:  Javi Leon MD  [address] [place] [id number]    /60 (BP Location: Right arm, Patient Position: Sitting, Cuff Size: Adult Regular)   Pulse 79   Ht 1.803 m (5' 11\")   Wt 69.9 kg (154 lb)   SpO2 100%   BMI 21.48 kg/m      Do you need any medication refills at today's visit? yes  Lucien Harrison CMA          Again, thank you for allowing me to participate in the care of your patient.        Sincerely,        Ángel Hill MD    "

## 2022-03-25 ENCOUNTER — TRANSFERRED RECORDS (OUTPATIENT)
Dept: HEALTH INFORMATION MANAGEMENT | Facility: CLINIC | Age: 72
End: 2022-03-25
Payer: COMMERCIAL

## 2022-03-25 ENCOUNTER — DOCUMENTATION ONLY (OUTPATIENT)
Dept: ANTICOAGULATION | Facility: CLINIC | Age: 72
End: 2022-03-25
Payer: COMMERCIAL

## 2022-03-25 DIAGNOSIS — I48.91 ATRIAL FIBRILLATION (H): ICD-10-CM

## 2022-03-25 DIAGNOSIS — I48.0 PAROXYSMAL ATRIAL FIBRILLATION (H): ICD-10-CM

## 2022-03-25 DIAGNOSIS — Z79.01 LONG TERM CURRENT USE OF ANTICOAGULANT THERAPY: Primary | ICD-10-CM

## 2022-03-25 LAB — INR (EXTERNAL): 2.1 (ref 0.9–1.1)

## 2022-03-25 NOTE — PROGRESS NOTES
ANTICOAGULATION  MANAGEMENT-Home Monitor Managed by Exception    Haresh Rock 71 year old male is on warfarin with therapeutic INR result. (Goal INR 2.0-3.0)    Recent labs: (last 7 days)     03/25/22  0910   INR 2.1*         Previous INR was Therapeutic    Medication, diet, health changes since last INR:chart reviewed; none identified    Contacted within the last 12 weeks by phone on 1/28/22      MINNIE Hutson was NOT contacted regarding therapeutic result today per home monitoring policy manage by exception agreement.   Current warfarin dose is to be continued:     Summary  As of 3/25/2022    Full warfarin instructions:  5 mg every Fri; 3.75 mg all other days   Next INR check:  4/1/2022           ?   Macrina Preston RN  Anticoagulation Clinic  3/25/2022    _______________________________________________________________________     Anticoagulation Episode Summary     Current INR goal:  2.0-3.0   TTR:  72.3 % (11.7 mo)   Target end date:  Indefinite   Send INR reminders to:  CHELSEA CHILEL    Indications    Long-term (current) use of anticoagulants [Z79.01] [Z79.01]  Atrial fibrillation (H) [I48.91]  Antiphospholipid antibody syndrome (H) (Resolved) [D68.61]  Personal history of DVT (deep vein thrombosis) (Resolved) [Z86.718]  Atrial fibrillation  unspecified type (H) (Resolved) [I48.91]  Paroxysmal atrial fibrillation (H) [I48.0]           Comments:  JESSICA rodas to manage by exception         Anticoagulation Care Providers     Provider Role Specialty Phone number    Anya Nowak MD Referring Family Medicine [phone]          Macrina Avila RN, BSN, PHN  Anticoagulation Nurse  [phone]

## 2022-04-01 ENCOUNTER — DOCUMENTATION ONLY (OUTPATIENT)
Dept: ANTICOAGULATION | Facility: CLINIC | Age: 72
End: 2022-04-01
Payer: COMMERCIAL

## 2022-04-01 ENCOUNTER — TRANSFERRED RECORDS (OUTPATIENT)
Dept: HEALTH INFORMATION MANAGEMENT | Facility: CLINIC | Age: 72
End: 2022-04-01
Payer: COMMERCIAL

## 2022-04-01 DIAGNOSIS — Z79.01 LONG TERM CURRENT USE OF ANTICOAGULANT THERAPY: Primary | ICD-10-CM

## 2022-04-01 DIAGNOSIS — I48.0 PAROXYSMAL ATRIAL FIBRILLATION (H): ICD-10-CM

## 2022-04-01 LAB — INR (EXTERNAL): 2 (ref 0.9–1.1)

## 2022-04-01 NOTE — PROGRESS NOTES
ANTICOAGULATION  MANAGEMENT-Home Monitor Managed by Exception    Hareshroger Rock 71 year old male is on warfarin with therapeutic INR result. (Goal INR 2.0-3.0)    Recent labs: (last 7 days)     04/01/22  1335   INR 2.0*         Previous INR was Therapeutic    Medication, diet, health changes since last INR:chart reviewed; none identified    Contacted within the last 12 weeks by phone on 1/28/22      MINNIE Hutson was NOT contacted regarding therapeutic result today per home monitoring policy manage by exception agreement.   Current warfarin dose is to be continued:     Summary  As of 4/1/2022    Full warfarin instructions:  5 mg every Fri; 3.75 mg all other days   Next INR check:  4/8/2022           ?   Allyn Doe RN  Anticoagulation Clinic  4/1/2022    _______________________________________________________________________     Anticoagulation Episode Summary     Current INR goal:  2.0-3.0   TTR:  72.3 % (11.7 mo)   Target end date:  Indefinite   Send INR reminders to:  CHELSEA CHILEL    Indications    Long-term (current) use of anticoagulants [Z79.01] [Z79.01]  Atrial fibrillation (H) [I48.91]  Antiphospholipid antibody syndrome (H) (Resolved) [D68.61]  Personal history of DVT (deep vein thrombosis) (Resolved) [Z86.718]  Atrial fibrillation  unspecified type (H) (Resolved) [I48.91]  Paroxysmal atrial fibrillation (H) [I48.0]           Comments:  JESSICA rodas to manage by exception         Anticoagulation Care Providers     Provider Role Specialty Phone number    Anya Noawk MD Referring Family Medicine [phone]

## 2022-04-03 DIAGNOSIS — I48.0 PAROXYSMAL ATRIAL FIBRILLATION (H): ICD-10-CM

## 2022-04-03 DIAGNOSIS — I47.10 PAROXYSMAL SUPRAVENTRICULAR TACHYCARDIA (H): ICD-10-CM

## 2022-04-04 RX ORDER — FLECAINIDE ACETATE 50 MG/1
TABLET ORAL
Qty: 180 TABLET | Refills: 2 | Status: SHIPPED | OUTPATIENT
Start: 2022-04-04 | End: 2022-09-20

## 2022-04-05 ENCOUNTER — ONCOLOGY VISIT (OUTPATIENT)
Dept: TRANSPLANT | Facility: CLINIC | Age: 72
End: 2022-04-05
Attending: INTERNAL MEDICINE
Payer: COMMERCIAL

## 2022-04-05 ENCOUNTER — APPOINTMENT (OUTPATIENT)
Dept: LAB | Facility: CLINIC | Age: 72
End: 2022-04-05
Attending: INTERNAL MEDICINE
Payer: COMMERCIAL

## 2022-04-05 ENCOUNTER — ANTICOAGULATION THERAPY VISIT (OUTPATIENT)
Dept: ANTICOAGULATION | Facility: CLINIC | Age: 72
End: 2022-04-05

## 2022-04-05 VITALS
TEMPERATURE: 97.1 F | HEART RATE: 72 BPM | OXYGEN SATURATION: 99 % | BODY MASS INDEX: 21.74 KG/M2 | SYSTOLIC BLOOD PRESSURE: 115 MMHG | DIASTOLIC BLOOD PRESSURE: 64 MMHG | RESPIRATION RATE: 16 BRPM | WEIGHT: 155.9 LBS

## 2022-04-05 DIAGNOSIS — E55.9 VITAMIN D DEFICIENCY: ICD-10-CM

## 2022-04-05 DIAGNOSIS — G20.A1 PARKINSON'S DISEASE (H): ICD-10-CM

## 2022-04-05 DIAGNOSIS — E83.110 HEREDITARY HEMOCHROMATOSIS (H): ICD-10-CM

## 2022-04-05 DIAGNOSIS — Z79.01 LONG TERM CURRENT USE OF ANTICOAGULANT THERAPY: Primary | ICD-10-CM

## 2022-04-05 DIAGNOSIS — I48.91 ATRIAL FIBRILLATION (H): ICD-10-CM

## 2022-04-05 DIAGNOSIS — I48.0 PAROXYSMAL ATRIAL FIBRILLATION (H): ICD-10-CM

## 2022-04-05 DIAGNOSIS — Z85.71 HISTORY OF HODGKIN'S LYMPHOMA: ICD-10-CM

## 2022-04-05 DIAGNOSIS — Z79.01 LONG TERM CURRENT USE OF ANTICOAGULANT THERAPY: ICD-10-CM

## 2022-04-05 LAB
ALBUMIN SERPL-MCNC: 3.6 G/DL (ref 3.4–5)
ALP SERPL-CCNC: 84 U/L (ref 40–150)
ALT SERPL W P-5'-P-CCNC: 11 U/L (ref 0–70)
ANION GAP SERPL CALCULATED.3IONS-SCNC: 4 MMOL/L (ref 3–14)
AST SERPL W P-5'-P-CCNC: 25 U/L (ref 0–45)
BASOPHILS # BLD AUTO: 0 10E3/UL (ref 0–0.2)
BASOPHILS NFR BLD AUTO: 0 %
BILIRUB SERPL-MCNC: 1 MG/DL (ref 0.2–1.3)
BUN SERPL-MCNC: 35 MG/DL (ref 7–30)
CALCIUM SERPL-MCNC: 8.9 MG/DL (ref 8.5–10.1)
CHLORIDE BLD-SCNC: 100 MMOL/L (ref 94–109)
CO2 SERPL-SCNC: 32 MMOL/L (ref 20–32)
CREAT SERPL-MCNC: 1.02 MG/DL (ref 0.66–1.25)
CRP SERPL-MCNC: 4.2 MG/L (ref 0–8)
DEPRECATED CALCIDIOL+CALCIFEROL SERPL-MC: 50 UG/L (ref 20–75)
EOSINOPHIL # BLD AUTO: 0.1 10E3/UL (ref 0–0.7)
EOSINOPHIL NFR BLD AUTO: 1 %
ERYTHROCYTE [DISTWIDTH] IN BLOOD BY AUTOMATED COUNT: 12.2 % (ref 10–15)
ERYTHROCYTE [SEDIMENTATION RATE] IN BLOOD BY WESTERGREN METHOD: 26 MM/HR (ref 0–20)
FERRITIN SERPL-MCNC: 75 NG/ML (ref 26–388)
GFR SERPL CREATININE-BSD FRML MDRD: 78 ML/MIN/1.73M2
GLUCOSE BLD-MCNC: 128 MG/DL (ref 70–99)
HCT VFR BLD AUTO: 44.1 % (ref 40–53)
HGB BLD-MCNC: 15 G/DL (ref 13.3–17.7)
IMM GRANULOCYTES # BLD: 0 10E3/UL
IMM GRANULOCYTES NFR BLD: 0 %
INR PPP: 2.9 (ref 0.85–1.15)
IRON SATN MFR SERPL: 58 % (ref 15–46)
IRON SERPL-MCNC: 147 UG/DL (ref 35–180)
LDH SERPL L TO P-CCNC: 175 U/L (ref 85–227)
LYMPHOCYTES # BLD AUTO: 2.7 10E3/UL (ref 0.8–5.3)
LYMPHOCYTES NFR BLD AUTO: 28 %
MAGNESIUM SERPL-MCNC: 2.1 MG/DL (ref 1.6–2.3)
MCH RBC QN AUTO: 32.5 PG (ref 26.5–33)
MCHC RBC AUTO-ENTMCNC: 34 G/DL (ref 31.5–36.5)
MCV RBC AUTO: 96 FL (ref 78–100)
MONOCYTES # BLD AUTO: 0.7 10E3/UL (ref 0–1.3)
MONOCYTES NFR BLD AUTO: 7 %
NEUTROPHILS # BLD AUTO: 6.2 10E3/UL (ref 1.6–8.3)
NEUTROPHILS NFR BLD AUTO: 64 %
NRBC # BLD AUTO: 0 10E3/UL
NRBC BLD AUTO-RTO: 0 /100
PHOSPHATE SERPL-MCNC: 4 MG/DL (ref 2.5–4.5)
PLATELET # BLD AUTO: 87 10E3/UL (ref 150–450)
POTASSIUM BLD-SCNC: 4.5 MMOL/L (ref 3.4–5.3)
PROT SERPL-MCNC: 7.6 G/DL (ref 6.8–8.8)
RBC # BLD AUTO: 4.61 10E6/UL (ref 4.4–5.9)
SODIUM SERPL-SCNC: 136 MMOL/L (ref 133–144)
TIBC SERPL-MCNC: 255 UG/DL (ref 240–430)
TOTAL PROTEIN SERUM FOR ELP: 7.2 G/DL (ref 6.8–8.8)
URATE SERPL-MCNC: 5.6 MG/DL (ref 3.5–7.2)
WBC # BLD AUTO: 9.8 10E3/UL (ref 4–11)

## 2022-04-05 PROCEDURE — 85610 PROTHROMBIN TIME: CPT | Performed by: INTERNAL MEDICINE

## 2022-04-05 PROCEDURE — 85652 RBC SED RATE AUTOMATED: CPT | Performed by: INTERNAL MEDICINE

## 2022-04-05 PROCEDURE — 82306 VITAMIN D 25 HYDROXY: CPT | Performed by: INTERNAL MEDICINE

## 2022-04-05 PROCEDURE — 36415 COLL VENOUS BLD VENIPUNCTURE: CPT | Performed by: INTERNAL MEDICINE

## 2022-04-05 PROCEDURE — 84550 ASSAY OF BLOOD/URIC ACID: CPT | Performed by: INTERNAL MEDICINE

## 2022-04-05 PROCEDURE — 86140 C-REACTIVE PROTEIN: CPT | Performed by: INTERNAL MEDICINE

## 2022-04-05 PROCEDURE — 85025 COMPLETE CBC W/AUTO DIFF WBC: CPT | Performed by: INTERNAL MEDICINE

## 2022-04-05 PROCEDURE — G0463 HOSPITAL OUTPT CLINIC VISIT: HCPCS

## 2022-04-05 PROCEDURE — 83550 IRON BINDING TEST: CPT | Performed by: INTERNAL MEDICINE

## 2022-04-05 PROCEDURE — 84155 ASSAY OF PROTEIN SERUM: CPT | Performed by: INTERNAL MEDICINE

## 2022-04-05 PROCEDURE — 84165 PROTEIN E-PHORESIS SERUM: CPT | Mod: TC | Performed by: PATHOLOGY

## 2022-04-05 PROCEDURE — 82947 ASSAY GLUCOSE BLOOD QUANT: CPT | Performed by: INTERNAL MEDICINE

## 2022-04-05 PROCEDURE — 83615 LACTATE (LD) (LDH) ENZYME: CPT | Performed by: INTERNAL MEDICINE

## 2022-04-05 PROCEDURE — 82728 ASSAY OF FERRITIN: CPT | Performed by: INTERNAL MEDICINE

## 2022-04-05 PROCEDURE — 83735 ASSAY OF MAGNESIUM: CPT | Performed by: INTERNAL MEDICINE

## 2022-04-05 PROCEDURE — 84100 ASSAY OF PHOSPHORUS: CPT | Performed by: INTERNAL MEDICINE

## 2022-04-05 PROCEDURE — 99214 OFFICE O/P EST MOD 30 MIN: CPT | Performed by: INTERNAL MEDICINE

## 2022-04-05 RX ORDER — AMOXICILLIN 500 MG/1
CAPSULE ORAL
COMMUNITY
Start: 2022-04-04 | End: 2022-06-08

## 2022-04-05 ASSESSMENT — PAIN SCALES - GENERAL: PAINLEVEL: NO PAIN (0)

## 2022-04-05 NOTE — LETTER
"    4/5/2022         RE: Haresh Rock  [address] [address]        Dear Colleague,    Thank you for referring your patient, Haresh Rock, to the [facility] [place]. Please see a copy of my visit note below.    BONE MARROW TRANSPLANT VISIT      Haresh returns to the [facility] for history of a JAK2 positive atypical myeloproliferative syndrome, status post nonmyeloablative allo-sib peripheral blood stem cell transplant in 2007, complicated by chronic moegr-oinpao-icnl disease, cirrhosis, hemochromatosis with C282Y homozygosity, pulmonary emboli and DVT as well as paroxysmal atrial fibrillation for which he is on warfarin, Hodgkin disease in 2011 s/p ABVD x 5 cycles, and a history of cardiac arrhythmias with an ablation. He was  diagnosed by Dr. Ángel Hill with Parkinson disease, with weight loss, dysphagia with aspiration, requiring G tube placement, orthostasis, urinary obstruction. Most recent CT and PET 2/26/21 were without evidence for recurrence of malignancy. Hypermetabolic activity at the anorectal junction with diffuse wall thickening was worked up by 3/17/21 flex sigmoidoscopy which showed a 4 mm polyp (removed, tubular adenoma), and erythematous mucosa that was biopsied and found superficial vascular congestion and melanosis coli without evidence for GVHD. Had full colonoscopy 4/14/21 without other findings. He had TURP with Dr. Hawley for urinary obstruction 8/9/21, as medical therapy was causing too much orthostasis.    INTERVAL HISTORY  Haresh was hospitalized in October and had a small bowel resection because of a G tube complication. G tube was replaced. He receives 2200 dee per day with tube feeding and every once in a while tries to eat but then aspirates. He can drink 1/2 cup Mountain due per day. No fevers, chills, bleeding or bruising, no constipation or diarrhea, back to \"regular\" every 2-3 days and they are solid. No " leg edema. Orthostatic symptoms haven't been too bad. No pains anywhere. No chest pains or shortness of breath. He watches a lot of TV and is on his computer. Wife Angelica has her own problems with her back Plans to get 4th COVID vaccine Takes warfarin 3.75/d and 5mg on Fridays Parkinsons sx have been manageable , occ tremor right hand    PAST MEDICAL HISTORY:  As above See 12/21 note    SOCIAL HISTORY:  See my note from 12/2021     FAMILY HISTORY:  See my note from  12/2021     REVIEW OF SYSTEMS:  A 12-point review of systems is done and is negative, except as in the HPI.     PHYSICAL EXAMINATION: /64   Pulse 72   Temp 97.1  F (36.2  C) (Tympanic)   Resp 16   Wt 70.7 kg (155 lb 14.4 oz)   SpO2 99%   BMI 21.74 kg/m     Wt Readings from Last 10 Encounters:   04/05/22 70.7 kg (155 lb 14.4 oz)   03/21/22 69.9 kg (154 lb)   01/12/22 67.6 kg (149 lb)   01/05/22 66.7 kg (147 lb)   12/07/21 66.8 kg (147 lb 3.2 oz)   12/01/21 63.7 kg (140 lb 8 oz)   11/10/21 61.7 kg (136 lb)   11/04/21 62.5 kg (137 lb 12.8 oz)   10/25/21 64.4 kg (142 lb)   10/22/21 66.2 kg (146 lb)     EYES:  Grossly normal to inspection, no discharge, erythema or icterus.   SKIN:   skin is clear.  No significant rash or abnormal pigmentation.   NEUROLOGIC:  Cranial nerves seem to be intact.  Mentation and speech is appropriate for age. minimal resting tremor.  voice is stable.  PSYCHIATRIC:  Mentation appears normal.  He does not seem anxious today.   COR  1/6 SAYDA noted at LSB,   ABD No organomegaly G tube site ok  RESP Clear to P and A  Results for PAMELA GRANADOS (MRN [id number]) as of 4/5/2022 10:00   Ref. Range 4/5/2022 08:59   Sodium Latest Ref Range: 133 - 144 mmol/L 136   Potassium Latest Ref Range: 3.4 - 5.3 mmol/L 4.5   Chloride Latest Ref Range: 94 - 109 mmol/L 100   Carbon Dioxide Latest Ref Range: 20 - 32 mmol/L 32   Urea Nitrogen Latest Ref Range: 7 - 30 mg/dL 35 (H)   Creatinine Latest Ref Range: 0.66 - 1.25 mg/dL 1.02   GFR  Estimate Latest Ref Range: >60 mL/min/1.73m2 78   Calcium Latest Ref Range: 8.5 - 10.1 mg/dL 8.9   Anion Gap Latest Ref Range: 3 - 14 mmol/L 4   Magnesium Latest Ref Range: 1.6 - 2.3 mg/dL 2.1   Phosphorus Latest Ref Range: 2.5 - 4.5 mg/dL 4.0   Albumin Latest Ref Range: 3.4 - 5.0 g/dL 3.6   Protein Total Latest Ref Range: 6.8 - 8.8 g/dL 7.6   Bilirubin Total Latest Ref Range: 0.2 - 1.3 mg/dL 1.0   Alkaline Phosphatase Latest Ref Range: 40 - 150 U/L 84   ALT Latest Ref Range: 0 - 70 U/L 11   AST Latest Ref Range: 0 - 45 U/L 25   CRP Inflammation Latest Ref Range: 0.0 - 8.0 mg/L 4.2   Ferritin Latest Ref Range: 26 - 388 ng/mL 75   Iron Latest Ref Range: 35 - 180 ug/dL 147   Iron Binding Cap Latest Ref Range: 240 - 430 ug/dL 255   Iron Saturation Index Latest Ref Range: 15 - 46 % 58 (H)   Lactate Dehydrogenase Latest Ref Range: 85 - 227 U/L 175   Uric Acid Latest Ref Range: 3.5 - 7.2 mg/dL 5.6   Glucose Latest Ref Range: 70 - 99 mg/dL 128 (H)   WBC Latest Ref Range: 4.0 - 11.0 10e3/uL 9.8   Hemoglobin Latest Ref Range: 13.3 - 17.7 g/dL 15.0   Hematocrit Latest Ref Range: 40.0 - 53.0 % 44.1   Platelet Count Latest Ref Range: 150 - 450 10e3/uL 87 (L)   RBC Count Latest Ref Range: 4.40 - 5.90 10e6/uL 4.61   MCV Latest Ref Range: 78 - 100 fL 96   MCH Latest Ref Range: 26.5 - 33.0 pg 32.5   MCHC Latest Ref Range: 31.5 - 36.5 g/dL 34.0   RDW Latest Ref Range: 10.0 - 15.0 % 12.2   % Neutrophils Latest Units: % 64   % Lymphocytes Latest Units: % 28   % Monocytes Latest Units: % 7   % Eosinophils Latest Units: % 1   % Basophils Latest Units: % 0   Absolute Basophils Latest Ref Range: 0.0 - 0.2 10e3/uL 0.0   Absolute Eosinophils Latest Ref Range: 0.0 - 0.7 10e3/uL 0.1   Absolute Immature Granulocytes Latest Ref Range: <=0.4 10e3/uL 0.0   Absolute Lymphocytes Latest Ref Range: 0.8 - 5.3 10e3/uL 2.7   Absolute Monocytes Latest Ref Range: 0.0 - 1.3 10e3/uL 0.7   % Immature Granulocytes Latest Units: % 0   Absolute Neutrophils  Latest Ref Range: 1.6 - 8.3 10e3/uL 6.2   Absolute NRBCs Latest Units: 10e3/uL 0.0   NRBCs per 100 WBC Latest Ref Range: <1 /100 0   Sed Rate Latest Ref Range: 0 - 20 mm/hr 26 (H)   INR Latest Ref Range: 0.85 - 1.15  2.90 (H)     ASSESSMENT:     1.  Myeloproliferative syndrome/MDS. STEVIE 2 positive  2.  Status post non-myeloablative allo-sib peripheral blood stem cell transplant 2007  3.  History of chronic fktps-bdmjag-ynpg disease.   4.  History of Hodgkin's disease 2011 s/p ABVD x 5 cycles.   5.  History of cardiac arrhythmias, SVT and V tach.   6.  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   7.  History of cirrhosis.   8.  History of pulmonary emboli.   9.  History of elevated hemoglobin A1c.   10. Thyroid nodule.    11. Cholelithiasis.     12. Diverticulosis.   13. Anticoagulation with warfarin for history of DVT, PE, and paroxysmal atrial fibrillation  14. Status post bilateral knee replacement.     15. Aortic stenosis  16. 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  17. Seborrheic keratosis of the back.  18. Weight loss, dysphagia, anorexia related to Parkinson's  19. Parkinson's  20. 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 Chronic aspiration related to dysphagia,ASSESSMENT:    Seems to be improved with weight gain from 136 in November to 155 today, with tube feeds.CRP has come down to 4.2 Still suspect he has chonic aspiration.He should get a 4th COVID vaccine. No evidence of Hodgkin's  at this time.  parkinsons' sx under control with carbidopa.    RTC 6 months  With labs     I spent a total of 30  minutes face to face with Haresh Rock during today's office visit. Over 50% of this time was spent counseling the patient and coordinating the care regarding weight loss parkinson's and dysphagia          Again, thank you for allowing me to participate in the care of your patient.      Sincerely,    Augusto Miller MD

## 2022-04-05 NOTE — NURSING NOTE
"Oncology Rooming Note    April 5, 2022 9:43 AM   Haresh Rock is a 71 year old male who presents for:    Chief Complaint   Patient presents with     Blood Draw     Labs drawn via  by RN in lab. VS taken     Oncology Clinic Visit     History of Hodgkin's lymphoma     Initial Vitals: /64   Pulse 72   Temp 97.1  F (36.2  C) (Tympanic)   Resp 16   Wt 70.7 kg (155 lb 14.4 oz)   SpO2 99%   BMI 21.74 kg/m   Estimated body mass index is 21.74 kg/m  as calculated from the following:    Height as of 3/21/22: 1.803 m (5' 11\").    Weight as of this encounter: 70.7 kg (155 lb 14.4 oz). Body surface area is 1.88 meters squared.  No Pain (0) Comment: Data Unavailable   No LMP for male patient.  Allergies reviewed: Yes  Medications reviewed: Yes    Medications: Medication refills not needed today.  Pharmacy name entered into [facility]: [id number] - [place], [place] - [address]    Clinical concerns: Patient has a tube placed- reports increased weight. Prescribed amoxicillin for dentist appoint ment next week, reports not taking Zofran.        Mariposa Diallo LPN April 5, 2022 9:44 AM                "

## 2022-04-05 NOTE — CONFIDENTIAL NOTE
Signed Prescriptions:                        Disp   Refills    flecainide (TAMBOCOR) 50 MG tablet         180 ta*2        Sig: TAKE 1 TABLET BY MOUTH TWICE A DAY  Authorizing Provider: JOEL MATA  Ordering User: PETRONA SINHA Comprehensive Metabolic Panel:  Sodium   Date Value Ref Range Status   12/20/2021 136 133 - 144 mmol/L Final   07/05/2021 136 133 - 144 mmol/L Final     Potassium   Date Value Ref Range Status   12/20/2021 4.5 3.4 - 5.3 mmol/L Final   07/05/2021 3.3 (L) 3.4 - 5.3 mmol/L Final     Chloride   Date Value Ref Range Status   12/20/2021 99 94 - 109 mmol/L Final   07/05/2021 103 94 - 109 mmol/L Final     Carbon Dioxide   Date Value Ref Range Status   07/05/2021 28 20 - 32 mmol/L Final     Carbon Dioxide (CO2)   Date Value Ref Range Status   12/20/2021 33 (H) 20 - 32 mmol/L Final     Anion Gap   Date Value Ref Range Status   12/20/2021 4 3 - 14 mmol/L Final   07/05/2021 4 3 - 14 mmol/L Final     Glucose   Date Value Ref Range Status   12/20/2021 126 (H) 70 - 99 mg/dL Final   07/05/2021 97 70 - 99 mg/dL Final     Urea Nitrogen   Date Value Ref Range Status   12/20/2021 32 (H) 7 - 30 mg/dL Final   07/05/2021 25 7 - 30 mg/dL Final     Creatinine   Date Value Ref Range Status   12/20/2021 0.91 0.66 - 1.25 mg/dL Final   07/05/2021 1.15 0.66 - 1.25 mg/dL Final     GFR Estimate   Date Value Ref Range Status   12/20/2021 83 >60 mL/min/1.73m2 Final     Comment:     As of July 11, 2021, eGFR is calculated by the CKD-EPI creatinine equation, without race adjustment. eGFR can be influenced by muscle mass, exercise, and diet. The reported eGFR is an estimation only and is only applicable if the renal function is stable.   07/05/2021 63 >60 mL/min/[1.73_m2] Final     Comment:     Non  GFR Calc  Starting 12/18/2018, serum creatinine based estimated GFR (eGFR) will be   calculated using the Chronic Kidney Disease Epidemiology Collaboration   (CKD-EPI) equation.       Calcium   Date Value  Ref Range Status   12/20/2021 9.4 8.5 - 10.1 mg/dL Final   07/05/2021 8.3 (L) 8.5 - 10.1 mg/dL Final     Tiffany Mckeon RN  Cardiology Care Coordinator  [facility]  [phone] option 1

## 2022-04-05 NOTE — PROGRESS NOTES
"BONE MARROW TRANSPLANT VISIT      Haresh returns to the [facility] for history of a JAK2 positive atypical myeloproliferative syndrome, status post nonmyeloablative allo-sib peripheral blood stem cell transplant in 2007, complicated by chronic wjnqa-nlncjv-zdul disease, cirrhosis, hemochromatosis with C282Y homozygosity, pulmonary emboli and DVT as well as paroxysmal atrial fibrillation for which he is on warfarin, Hodgkin disease in 2011 s/p ABVD x 5 cycles, and a history of cardiac arrhythmias with an ablation. He was  diagnosed by Dr. Ángel Hill with Parkinson disease, with weight loss, dysphagia with aspiration, requiring G tube placement, orthostasis, urinary obstruction. Most recent CT and PET 2/26/21 were without evidence for recurrence of malignancy. Hypermetabolic activity at the anorectal junction with diffuse wall thickening was worked up by 3/17/21 flex sigmoidoscopy which showed a 4 mm polyp (removed, tubular adenoma), and erythematous mucosa that was biopsied and found superficial vascular congestion and melanosis coli without evidence for GVHD. Had full colonoscopy 4/14/21 without other findings. He had TURP with Dr. Hawley for urinary obstruction 8/9/21, as medical therapy was causing too much orthostasis.    INTERVAL HISTORY  Haresh was hospitalized in October and had a small bowel resection because of a G tube complication. G tube was replaced. He receives 2200 dee per day with tube feeding and every once in a while tries to eat but then aspirates. He can drink 1/2 cup Mountain due per day. No fevers, chills, bleeding or bruising, no constipation or diarrhea, back to \"regular\" every 2-3 days and they are solid. No leg edema. Orthostatic symptoms haven't been too bad. No pains anywhere. No chest pains or shortness of breath. He watches a lot of TV and is on his computer. Wife Angelica has her own problems with her back Plans to get 4th COVID vaccine Takes warfarin 3.75/d and 5mg on " Fridays Parkinsons sx have been manageable , occ tremor right hand    PAST MEDICAL HISTORY:  As above See 12/21 note    SOCIAL HISTORY:  See my note from 12/2021     FAMILY HISTORY:  See my note from  12/2021     REVIEW OF SYSTEMS:  A 12-point review of systems is done and is negative, except as in the HPI.     PHYSICAL EXAMINATION: /64   Pulse 72   Temp 97.1  F (36.2  C) (Tympanic)   Resp 16   Wt 70.7 kg (155 lb 14.4 oz)   SpO2 99%   BMI 21.74 kg/m     Wt Readings from Last 10 Encounters:   04/05/22 70.7 kg (155 lb 14.4 oz)   03/21/22 69.9 kg (154 lb)   01/12/22 67.6 kg (149 lb)   01/05/22 66.7 kg (147 lb)   12/07/21 66.8 kg (147 lb 3.2 oz)   12/01/21 63.7 kg (140 lb 8 oz)   11/10/21 61.7 kg (136 lb)   11/04/21 62.5 kg (137 lb 12.8 oz)   10/25/21 64.4 kg (142 lb)   10/22/21 66.2 kg (146 lb)     EYES:  Grossly normal to inspection, no discharge, erythema or icterus.   SKIN:   skin is clear.  No significant rash or abnormal pigmentation.   NEUROLOGIC:  Cranial nerves seem to be intact.  Mentation and speech is appropriate for age. minimal resting tremor.  voice is stable.  PSYCHIATRIC:  Mentation appears normal.  He does not seem anxious today.   COR  1/6 SAYDA noted at LSB,   ABD No organomegaly G tube site ok  RESP Clear to P and A  Results for PAMELA GRANADOS (MRN [id number]) as of 4/5/2022 10:00   Ref. Range 4/5/2022 08:59   Sodium Latest Ref Range: 133 - 144 mmol/L 136   Potassium Latest Ref Range: 3.4 - 5.3 mmol/L 4.5   Chloride Latest Ref Range: 94 - 109 mmol/L 100   Carbon Dioxide Latest Ref Range: 20 - 32 mmol/L 32   Urea Nitrogen Latest Ref Range: 7 - 30 mg/dL 35 (H)   Creatinine Latest Ref Range: 0.66 - 1.25 mg/dL 1.02   GFR Estimate Latest Ref Range: >60 mL/min/1.73m2 78   Calcium Latest Ref Range: 8.5 - 10.1 mg/dL 8.9   Anion Gap Latest Ref Range: 3 - 14 mmol/L 4   Magnesium Latest Ref Range: 1.6 - 2.3 mg/dL 2.1   Phosphorus Latest Ref Range: 2.5 - 4.5 mg/dL 4.0   Albumin Latest Ref Range: 3.4  - 5.0 g/dL 3.6   Protein Total Latest Ref Range: 6.8 - 8.8 g/dL 7.6   Bilirubin Total Latest Ref Range: 0.2 - 1.3 mg/dL 1.0   Alkaline Phosphatase Latest Ref Range: 40 - 150 U/L 84   ALT Latest Ref Range: 0 - 70 U/L 11   AST Latest Ref Range: 0 - 45 U/L 25   CRP Inflammation Latest Ref Range: 0.0 - 8.0 mg/L 4.2   Ferritin Latest Ref Range: 26 - 388 ng/mL 75   Iron Latest Ref Range: 35 - 180 ug/dL 147   Iron Binding Cap Latest Ref Range: 240 - 430 ug/dL 255   Iron Saturation Index Latest Ref Range: 15 - 46 % 58 (H)   Lactate Dehydrogenase Latest Ref Range: 85 - 227 U/L 175   Uric Acid Latest Ref Range: 3.5 - 7.2 mg/dL 5.6   Glucose Latest Ref Range: 70 - 99 mg/dL 128 (H)   WBC Latest Ref Range: 4.0 - 11.0 10e3/uL 9.8   Hemoglobin Latest Ref Range: 13.3 - 17.7 g/dL 15.0   Hematocrit Latest Ref Range: 40.0 - 53.0 % 44.1   Platelet Count Latest Ref Range: 150 - 450 10e3/uL 87 (L)   RBC Count Latest Ref Range: 4.40 - 5.90 10e6/uL 4.61   MCV Latest Ref Range: 78 - 100 fL 96   MCH Latest Ref Range: 26.5 - 33.0 pg 32.5   MCHC Latest Ref Range: 31.5 - 36.5 g/dL 34.0   RDW Latest Ref Range: 10.0 - 15.0 % 12.2   % Neutrophils Latest Units: % 64   % Lymphocytes Latest Units: % 28   % Monocytes Latest Units: % 7   % Eosinophils Latest Units: % 1   % Basophils Latest Units: % 0   Absolute Basophils Latest Ref Range: 0.0 - 0.2 10e3/uL 0.0   Absolute Eosinophils Latest Ref Range: 0.0 - 0.7 10e3/uL 0.1   Absolute Immature Granulocytes Latest Ref Range: <=0.4 10e3/uL 0.0   Absolute Lymphocytes Latest Ref Range: 0.8 - 5.3 10e3/uL 2.7   Absolute Monocytes Latest Ref Range: 0.0 - 1.3 10e3/uL 0.7   % Immature Granulocytes Latest Units: % 0   Absolute Neutrophils Latest Ref Range: 1.6 - 8.3 10e3/uL 6.2   Absolute NRBCs Latest Units: 10e3/uL 0.0   NRBCs per 100 WBC Latest Ref Range: <1 /100 0   Sed Rate Latest Ref Range: 0 - 20 mm/hr 26 (H)   INR Latest Ref Range: 0.85 - 1.15  2.90 (H)     ASSESSMENT:     1.  Myeloproliferative  syndrome/MDS. STEVIE 2 positive  2.  Status post non-myeloablative allo-sib peripheral blood stem cell transplant 2007  3.  History of chronic arifs-mgojaa-vdqo disease.   4.  History of Hodgkin's disease 2011 s/p ABVD x 5 cycles.   5.  History of cardiac arrhythmias, SVT and V tach.   6.  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   7.  History of cirrhosis.   8.  History of pulmonary emboli.   9.  History of elevated hemoglobin A1c.   10. Thyroid nodule.    11. Cholelithiasis.     12. Diverticulosis.   13. Anticoagulation with warfarin for history of DVT, PE, and paroxysmal atrial fibrillation  14. Status post bilateral knee replacement.     15. Aortic stenosis  16. 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  17. Seborrheic keratosis of the back.  18. Weight loss, dysphagia, anorexia related to Parkinson's  19. Parkinson's  20. 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 Chronic aspiration related to dysphagia,ASSESSMENT:    Seems to be improved with weight gain from 136 in November to 155 today, with tube feeds.CRP has come down to 4.2 Still suspect he has chonic aspiration.He should get a 4th COVID vaccine. No evidence of Hodgkin's  at this time.  parkinsons' sx under control with carbidopa.    RTC 6 months  With labs     I spent a total of 30  minutes face to face with Haresh Rock during today's office visit. Over 50% of this time was spent counseling the patient and coordinating the care regarding weight loss parkinson's and dysphagia    Augusto Miller MD   [phone]

## 2022-04-05 NOTE — PROGRESS NOTES
ANTICOAGULATION MANAGEMENT     Haresh Rock 71 year old male is on warfarin with therapeutic INR result. (Goal INR 2.0-3.0)    Recent labs: (last 7 days)     04/05/22  0859   INR 2.90*       ASSESSMENT       Source(s): Chart Review    Previous INR was Therapeutic last 2(+) visits    Medication, diet, health changes since last INR chart reviewed; none identified           PLAN     Recommended plan for no diet, medication or health factor changes affecting INR     Dosing Instructions: continue your current warfarin dose with next INR in 1 week    Summary  As of 4/5/2022    Full warfarin instructions:  5 mg every Fri; 3.75 mg all other days   Next INR check:  4/15/2022             Detailed voice message left for Haresh with dosing instructions and follow up date.     Patient to recheck with home meter    Education provided: Please call back if any changes to your diet, medications or how you've been taking warfarin    Plan made per [facility] anticoagulation protocol    Martín Rico RN  Anticoagulation Clinic  4/5/2022    _______________________________________________________________________     Anticoagulation Episode Summary     Current INR goal:  2.0-3.0   TTR:  72.3 % (11.7 mo)   Target end date:  Indefinite   Send INR reminders to:  CHELSEA CHILEL    Indications    Long-term (current) use of anticoagulants [Z79.01] [Z79.01]  Atrial fibrillation (H) [I48.91]  Antiphospholipid antibody syndrome (H) (Resolved) [D68.61]  Personal history of DVT (deep vein thrombosis) (Resolved) [Z86.718]  Atrial fibrillation  unspecified type (H) (Resolved) [I48.91]  Paroxysmal atrial fibrillation (H) [I48.0]           Comments:  JESSICA rodas to manage by exception         Anticoagulation Care Providers     Provider Role Specialty Phone number    Anya Nowak MD Referring Family Medicine [phone]

## 2022-04-05 NOTE — NURSING NOTE
Chief Complaint   Patient presents with     Blood Draw     Labs drawn via  by RN in lab. VS taken       Labs collected from venipuncture by RN. Vitals taken. Checked in for appointment(s).    Desi Boo RN

## 2022-04-06 ENCOUNTER — PATIENT OUTREACH (OUTPATIENT)
Dept: ONCOLOGY | Facility: CLINIC | Age: 72
End: 2022-04-06

## 2022-04-06 ENCOUNTER — IMMUNIZATION (OUTPATIENT)
Dept: NURSING | Facility: CLINIC | Age: 72
End: 2022-04-06
Payer: COMMERCIAL

## 2022-04-06 LAB
ALBUMIN SERPL ELPH-MCNC: 3.9 G/DL (ref 3.7–5.1)
ALPHA1 GLOB SERPL ELPH-MCNC: 0.3 G/DL (ref 0.2–0.4)
ALPHA2 GLOB SERPL ELPH-MCNC: 0.7 G/DL (ref 0.5–0.9)
B-GLOBULIN SERPL ELPH-MCNC: 0.9 G/DL (ref 0.6–1)
GAMMA GLOB SERPL ELPH-MCNC: 1.4 G/DL (ref 0.7–1.6)
M PROTEIN SERPL ELPH-MCNC: 0 G/DL
PROT PATTERN SERPL ELPH-IMP: NORMAL

## 2022-04-06 PROCEDURE — 0064A COVID-19,PF,MODERNA (18+ YRS BOOSTER .25ML): CPT

## 2022-04-06 PROCEDURE — 91306 COVID-19,PF,MODERNA (18+ YRS BOOSTER .25ML): CPT

## 2022-04-06 PROCEDURE — 84165 PROTEIN E-PHORESIS SERUM: CPT | Mod: 26 | Performed by: PATHOLOGY

## 2022-04-06 NOTE — PROGRESS NOTES
Received faxed document from [employer] stating pt has been approved for outpatient enteral nutrition.  Coverage is approved for 648 units per month of Osmolite 1.5, provided by [facility] between 4/1/22 to 12/31/22.  (Approval #[id number]).  Per conversation with Dr. Geovani Miller, he is glad pt on enteral nutrition but Dr. Miller is not ordering provider.  More likely ordered by team managing pt's Parkinson's.  Per conversation with Desi, she stated to fax information to them, attn: billing, at [phone].  She stated they would figure out who is ordering provider for enteral nutrition.    Faxed information to as above.  Went thru per our [employer] system.

## 2022-04-08 ENCOUNTER — TRANSFERRED RECORDS (OUTPATIENT)
Dept: HEALTH INFORMATION MANAGEMENT | Facility: CLINIC | Age: 72
End: 2022-04-08
Payer: COMMERCIAL

## 2022-04-08 ENCOUNTER — DOCUMENTATION ONLY (OUTPATIENT)
Dept: ANTICOAGULATION | Facility: CLINIC | Age: 72
End: 2022-04-08
Payer: COMMERCIAL

## 2022-04-08 DIAGNOSIS — I48.0 PAROXYSMAL ATRIAL FIBRILLATION (H): ICD-10-CM

## 2022-04-08 DIAGNOSIS — Z79.01 LONG TERM CURRENT USE OF ANTICOAGULANT THERAPY: Primary | ICD-10-CM

## 2022-04-08 DIAGNOSIS — I48.91 ATRIAL FIBRILLATION (H): ICD-10-CM

## 2022-04-08 LAB — INR (EXTERNAL): 2.6 (ref 0.9–1.1)

## 2022-04-08 NOTE — PROGRESS NOTES
ANTICOAGULATION  MANAGEMENT-Home Monitor Managed by Exception    Haresh EMILIE Rock 71 year old male is on warfarin with therapeutic INR result. (Goal INR 2.0-3.0)    Recent labs: (last 7 days)     04/08/22  0923   INR 2.6*         Previous INR was Therapeutic    Medication, diet, health changes since last INR:chart reviewed; none identified    Contacted within the last 12 weeks by phone on 4/5/22      MINNIE Hutson was NOT contacted regarding therapeutic result today per home monitoring policy manage by exception agreement.   Current warfarin dose is to be continued:     Summary  As of 4/8/2022    Full warfarin instructions:  5 mg every Fri; 3.75 mg all other days   Next INR check:  4/15/2022           ?   Blanca Rodriguez RN  Anticoagulation Clinic  4/8/2022    _______________________________________________________________________     Anticoagulation Episode Summary     Current INR goal:  2.0-3.0   TTR:  72.3 % (11.7 mo)   Target end date:  Indefinite   Send INR reminders to:  CHELSEA CHILEL    Indications    Long-term (current) use of anticoagulants [Z79.01] [Z79.01]  Atrial fibrillation (H) [I48.91]  Antiphospholipid antibody syndrome (H) (Resolved) [D68.61]  Personal history of DVT (deep vein thrombosis) (Resolved) [Z86.718]  Atrial fibrillation  unspecified type (H) (Resolved) [I48.91]  Paroxysmal atrial fibrillation (H) [I48.0]           Comments:  JESSICA rodas to manage by exception         Anticoagulation Care Providers     Provider Role Specialty Phone number    Anya Nowak MD Referring Family Medicine [phone]

## 2022-04-15 ENCOUNTER — DOCUMENTATION ONLY (OUTPATIENT)
Dept: ANTICOAGULATION | Facility: CLINIC | Age: 72
End: 2022-04-15
Payer: COMMERCIAL

## 2022-04-15 DIAGNOSIS — Z79.01 LONG TERM CURRENT USE OF ANTICOAGULANT THERAPY: Primary | ICD-10-CM

## 2022-04-15 DIAGNOSIS — I48.0 PAROXYSMAL ATRIAL FIBRILLATION (H): ICD-10-CM

## 2022-04-15 LAB — INR (EXTERNAL): 2.1 (ref 0.9–1.1)

## 2022-04-15 NOTE — PROGRESS NOTES
ANTICOAGULATION  MANAGEMENT-Home Monitor Managed by Exception    Haresh Rock 71 year old male is on warfarin with therapeutic INR result. (Goal INR 2.0-3.0)    Recent labs: (last 7 days)     04/15/22  0945   INR 2.1*         Previous INR was Therapeutic    Medication, diet, health changes since last INR:chart reviewed; none identified    Contacted within the last 12 weeks by phone on 4/5/22      MINNIE Hutson was NOT contacted regarding therapeutic result today per home monitoring policy manage by exception agreement.   Current warfarin dose is to be continued:     Summary  As of 4/15/2022    Full warfarin instructions:  5 mg every Fri; 3.75 mg all other days   Next INR check:  4/22/2022           ?   Allyn Doe RN  Anticoagulation Clinic  4/15/2022    _______________________________________________________________________     Anticoagulation Episode Summary     Current INR goal:  2.0-3.0   TTR:  73.6 % (11.8 mo)   Target end date:  Indefinite   Send INR reminders to:  CHELSEA CHILEL    Indications    Long-term (current) use of anticoagulants [Z79.01] [Z79.01]  Atrial fibrillation (H) [I48.91]  Antiphospholipid antibody syndrome (H) (Resolved) [D68.61]  Personal history of DVT (deep vein thrombosis) (Resolved) [Z86.718]  Atrial fibrillation  unspecified type (H) (Resolved) [I48.91]  Paroxysmal atrial fibrillation (H) [I48.0]           Comments:  JESSICA rodas to manage by exception         Anticoagulation Care Providers     Provider Role Specialty Phone number    Anya Nowak MD Referring Family Medicine [phone]

## 2022-04-18 ENCOUNTER — PATIENT OUTREACH (OUTPATIENT)
Dept: CARE COORDINATION | Facility: CLINIC | Age: 72
End: 2022-04-18
Payer: COMMERCIAL

## 2022-04-18 NOTE — PROGRESS NOTES
Clinic Care Coordination Contact  [facility]/Voicemail       Clinical Data: CHW Outreach  Outreach attempted x 1. Left message on patient's voicemail with call back information and requested return call.    Plan: CHW will try to reach patient again in 3-5 business days.    Leyla Green  Community Health Worker   [facility]  Care Coordination  [place], [place], [place], [place], [facility]  [email or web address]   Office: [phone]

## 2022-04-22 ENCOUNTER — TRANSFERRED RECORDS (OUTPATIENT)
Dept: HEALTH INFORMATION MANAGEMENT | Facility: CLINIC | Age: 72
End: 2022-04-22
Payer: COMMERCIAL

## 2022-04-22 ENCOUNTER — DOCUMENTATION ONLY (OUTPATIENT)
Dept: FAMILY MEDICINE | Facility: CLINIC | Age: 72
End: 2022-04-22
Payer: COMMERCIAL

## 2022-04-22 DIAGNOSIS — I48.0 PAROXYSMAL ATRIAL FIBRILLATION (H): ICD-10-CM

## 2022-04-22 DIAGNOSIS — Z79.01 LONG TERM CURRENT USE OF ANTICOAGULANT THERAPY: Primary | ICD-10-CM

## 2022-04-22 LAB — INR (EXTERNAL): 2.1 (ref 2–3)

## 2022-04-22 NOTE — PROGRESS NOTES
ANTICOAGULATION  MANAGEMENT-Home Monitor Managed by Exception    Haresh EMILIE Rock 71 year old male is on warfarin with therapeutic INR result. (Goal INR 2.0-3.0)    Recent labs: (last 7 days)     04/22/22  1344   INR 2.1         Previous INR was Therapeutic    Medication, diet, health changes since last INR:chart reviewed; none identified    Contacted within the last 12 weeks by phone on 4/5      MINNIE Hutson was NOT contacted regarding therapeutic result today per home monitoring policy manage by exception agreement.   Current warfarin dose is to be continued:     Summary  As of 4/22/2022    Full warfarin instructions:  5 mg every Fri; 3.75 mg all other days   Next INR check:  4/29/2022           ?   Belinda Lafleur RN  Anticoagulation Clinic  4/22/2022    _______________________________________________________________________     Anticoagulation Episode Summary     Current INR goal:  2.0-3.0   TTR:  73.9 % (11.7 mo)   Target end date:  Indefinite   Send INR reminders to:  CHELSEA CHILEL    Indications    Long-term (current) use of anticoagulants [Z79.01] [Z79.01]  Atrial fibrillation (H) [I48.91]  Antiphospholipid antibody syndrome (H) (Resolved) [D68.61]  Personal history of DVT (deep vein thrombosis) (Resolved) [Z86.718]  Atrial fibrillation  unspecified type (H) (Resolved) [I48.91]  Paroxysmal atrial fibrillation (H) [I48.0]           Comments:  JESSICA rodas to manage by exception         Anticoagulation Care Providers     Provider Role Specialty Phone number    Anya Nowak MD Referring Family Medicine [phone]

## 2022-04-26 ENCOUNTER — HOME INFUSION (PRE-WILLOW HOME INFUSION) (OUTPATIENT)
Dept: PHARMACY | Facility: CLINIC | Age: 72
End: 2022-04-26
Payer: COMMERCIAL

## 2022-04-27 NOTE — PROGRESS NOTES
This is a recent snapshot of the patient's [facility] medical record.  For current drug dose and complete information and questions, call [phone] or In Basket pool, fv home infusion ([id number])  CSN Number:  [id number]

## 2022-04-27 NOTE — PROGRESS NOTES
This is a recent snapshot of the patient's [facility] medical record.  For current drug dose and complete information and questions, call [phone] or In Basket pool, fv home infusion ([id number])  CSN Number:  [id number]

## 2022-04-29 ENCOUNTER — TRANSFERRED RECORDS (OUTPATIENT)
Dept: HEALTH INFORMATION MANAGEMENT | Facility: CLINIC | Age: 72
End: 2022-04-29
Payer: COMMERCIAL

## 2022-04-29 ENCOUNTER — DOCUMENTATION ONLY (OUTPATIENT)
Dept: ANTICOAGULATION | Facility: CLINIC | Age: 72
End: 2022-04-29
Payer: COMMERCIAL

## 2022-04-29 DIAGNOSIS — I48.0 PAROXYSMAL ATRIAL FIBRILLATION (H): ICD-10-CM

## 2022-04-29 DIAGNOSIS — I48.91 ATRIAL FIBRILLATION (H): ICD-10-CM

## 2022-04-29 DIAGNOSIS — Z79.01 LONG TERM CURRENT USE OF ANTICOAGULANT THERAPY: Primary | ICD-10-CM

## 2022-04-29 LAB — INR (EXTERNAL): 2.2 (ref 0.9–1.1)

## 2022-04-29 NOTE — PROGRESS NOTES
ANTICOAGULATION  MANAGEMENT-Home Monitor Managed by Exception    Haresh EMILIE Rock 71 year old male is on warfarin with therapeutic INR result. (Goal INR 2.0-3.0)    Recent labs: (last 7 days)     04/29/22  0857   INR 2.2*         Previous INR was Therapeutic    Medication, diet, health changes since last INR:chart reviewed; none identified    Contacted within the last 12 weeks by phone on 04/05/2022      MINNIE Hutson was NOT contacted regarding therapeutic result today per home monitoring policy manage by exception agreement.   Current warfarin dose is to be continued:     Summary  As of 4/29/2022    Full warfarin instructions:  5 mg every Fri; 3.75 mg all other days   Next INR check:  5/6/2022           ?   Hari Saavedra RN  Anticoagulation Clinic  4/29/2022    _______________________________________________________________________     Anticoagulation Episode Summary     Current INR goal:  2.0-3.0   TTR:  74.5 % (11.7 mo)   Target end date:  Indefinite   Send INR reminders to:  CHELSEA CHILEL    Indications    Long-term (current) use of anticoagulants [Z79.01] [Z79.01]  Atrial fibrillation (H) [I48.91]  Antiphospholipid antibody syndrome (H) (Resolved) [D68.61]  Personal history of DVT (deep vein thrombosis) (Resolved) [Z86.718]  Atrial fibrillation  unspecified type (H) (Resolved) [I48.91]  Paroxysmal atrial fibrillation (H) [I48.0]           Comments:  JESSICA rodas to manage by exception         Anticoagulation Care Providers     Provider Role Specialty Phone number    Anya Nowak MD Referring Family Medicine [phone]

## 2022-05-06 ENCOUNTER — DOCUMENTATION ONLY (OUTPATIENT)
Dept: ANTICOAGULATION | Facility: CLINIC | Age: 72
End: 2022-05-06
Payer: COMMERCIAL

## 2022-05-06 ENCOUNTER — TRANSFERRED RECORDS (OUTPATIENT)
Dept: HEALTH INFORMATION MANAGEMENT | Facility: CLINIC | Age: 72
End: 2022-05-06
Payer: COMMERCIAL

## 2022-05-06 DIAGNOSIS — Z79.01 LONG TERM CURRENT USE OF ANTICOAGULANT THERAPY: Primary | ICD-10-CM

## 2022-05-06 DIAGNOSIS — I48.91 ATRIAL FIBRILLATION (H): ICD-10-CM

## 2022-05-06 DIAGNOSIS — I48.0 PAROXYSMAL ATRIAL FIBRILLATION (H): ICD-10-CM

## 2022-05-06 LAB — INR (EXTERNAL): 2.7 (ref 0.9–1.1)

## 2022-05-06 NOTE — PROGRESS NOTES
ANTICOAGULATION  MANAGEMENT-Home Monitor Managed by Exception    Haresh Rock 71 year old male is on warfarin with therapeutic INR result. (Goal INR 2.0-3.0)    Recent labs: (last 7 days)     05/06/22  1051   INR 2.7*         Previous INR was Therapeutic    Medication, diet, health changes since last INR:chart reviewed; none identified    Contacted within the last 12 weeks by phone on 4/5/22      MINNIE Hutson was NOT contacted regarding therapeutic result today per home monitoring policy manage by exception agreement.   Current warfarin dose is to be continued:     Summary  As of 5/6/2022    Full warfarin instructions:  5 mg every Fri; 3.75 mg all other days   Next INR check:  5/13/2022           ?   Macrina Preston RN  Anticoagulation Clinic  5/6/2022    _______________________________________________________________________     Anticoagulation Episode Summary     Current INR goal:  2.0-3.0   TTR:  75.4 % (11.7 mo)   Target end date:  Indefinite   Send INR reminders to:  CHELSEA CHILEL    Indications    Long-term (current) use of anticoagulants [Z79.01] [Z79.01]  Atrial fibrillation (H) [I48.91]  Antiphospholipid antibody syndrome (H) (Resolved) [D68.61]  Personal history of DVT (deep vein thrombosis) (Resolved) [Z86.718]  Atrial fibrillation  unspecified type (H) (Resolved) [I48.91]  Paroxysmal atrial fibrillation (H) [I48.0]           Comments:  JESSICA rodas to manage by exception         Anticoagulation Care Providers     Provider Role Specialty Phone number    Anya Nowak MD Referring Family Medicine [phone]          Macrina Avila RN, BSN, PHN  Anticoagulation Nurse  [phone]

## 2022-05-09 ENCOUNTER — PATIENT OUTREACH (OUTPATIENT)
Dept: CARE COORDINATION | Facility: CLINIC | Age: 72
End: 2022-05-09

## 2022-05-09 ENCOUNTER — PATIENT OUTREACH (OUTPATIENT)
Dept: NURSING | Facility: CLINIC | Age: 72
End: 2022-05-09
Payer: COMMERCIAL

## 2022-05-09 ASSESSMENT — ACTIVITIES OF DAILY LIVING (ADL): DEPENDENT_IADLS:: INDEPENDENT

## 2022-05-09 NOTE — PROGRESS NOTES
Patient spoke with CCC RN on 5/9/22 and discussed goals     CHW delegations: CHW will contact the patient in 4 weeks reviewing the goals as set above.     Next outreach due: 6/10/22

## 2022-05-09 NOTE — LETTER
[facility]  Patient Centered Plan of Care  About Me:        Patient Name:  Haresh Rock    YOB: 1950  Age:         72 year old   Radha MRN:    [id number] Telephone Information:  Home Phone [phone]   Mobile [phone]       Address:  [address] [place] [address] Email address:  [email or web address]      Emergency Contact(s)    Name Relationship Lgl Grd Work Phone Home Phone Mobile Phone   1. JANICE ROCK Spouse No   [phone]   2. LAY ROCK Daughter No   [phone]   3. SOL ROCK Son No   [phone]           Primary language:  English     needed? No   [employer]:  [phone] op. 1  Other communication barriers:Glasses; Language barrier    Preferred Method of Communication:  Mail  Current living arrangement: I live in a private home with family    Mobility Status/ Medical Equipment: Independent        Health Maintenance  Health Maintenance Reviewed: Due/Overdue   Health Maintenance Due   Topic Date Due     ZOSTER IMMUNIZATION (2 of 3) 11/14/2016     DIABETIC FOOT EXAM  04/23/2020     EYE EXAM  01/01/2022           My Access Plan  Medical Emergency 911   Primary Clinic Line [facility] - [phone]   24 Hour Appointment Line [phone] or  [phone]) (toll-free)   24 Hour Nurse Line [phone] (toll-free)   Preferred Urgent Care No data recorded   Preferred Hospital [facility]  [phone]     Preferred Pharmacy [id number] [place] - [address] [facility]     Behavioral Health Crisis Line The National Suicide Prevention Lifeline at 1-644.430.9826 or 911             My Care Team Members  Patient Care Team       Relationship Specialty Notifications Start End    Anya Nowak MD PCP - General Family Practice  4/23/19     Phone: [phone] Fax: [phone] [address] [facility] [place] [address]     Augusto Miller MD MD Hematology Admissions 9/30/11     Referred to Endocrinology    Phone: [phone] Fax: [phone]         [address] [facility] [address]    Jesusita Mejia PA-C Physician Assistant   3/21/12     Phone: [phone] Fax: [phone]         [address] [facility] [address] [facility] [address]    Pino Sharma MD MD Internal Medicine  4/11/16     Phone: [phone] Fax: [phone]         [address] [facility] [address] [facility] [address]    Fabi Jimenez MD MD INTERNAL MEDICINE - ENDOCRINOLOGY, DIABETES & METABOLISM  4/18/16     Phone: [phone] Fax: [phone]         [address] [facility] [address] [facility] [address]    Bekah Matt MD MD Cardiology  8/12/16     Phone: [phone] Fax: [phone]         [address] [facility] [address] [facility] [address]    Tina Mejia RN Specialty Care Coordinator Cardiology  3/6/19     Phone: [phone] Fax: [phone]         [address] [facility] [address]    Anya Nowak MD Assigned PCP   4/28/19     Phone: [phone] Fax: [phone] [address] [facility] [address]    Bekah Matt MD Assigned Heart and Vascular Provider   10/23/20     Phone: [phone] Fax: [phone]         [address] [facility] [address] [place] [address]    Ángel Hill MD Assigned Neuroscience Provider   11/15/20     Phone: [phone] Fax: [phone]         [address] [place] [address]    oRsalva Samuels MD MD Family Medicine  6/1/21     Phone: [phone] Fax: [phone] [address] [facility] [address]    Chelsey Crews MD Assigned Gastroenterology Provider   6/20/21     Phone: [phone] Fax: [phone]         [address] [facility] [address] [facility] [address]    Jose G Hawley MD Assigned Surgical Provider   8/1/21     Phone: [phone] Fax: [phone] [address] [place] [address]    Bekah Matt MD MD Clinical Cardiac  Electrophysiology  9/10/21     Phone: [phone] Fax: [phone]         [address] [facility] [address] [facility] [address]    Marcelo Jacques MD Assigned Infectious Disease Provider   9/12/21     Phone: [phone] Fax: [phone]         [address] [facility] [address]    Uriel Van, RN Lead Care Coordinator Primary Care - CC Admissions 11/5/21     Phone: [phone] Fax: [phone]        Lizbeth Chen, [facility] Pharmacist Pharmacist Ambulatory Care  11/12/21     Phone: [phone] [address] [facility] [address]    Mica Edwards, PhD LP Assigned Behavioral Health Provider   11/21/21     Phone: [phone] Fax: [phone]         [address] [facility] [address]    Vahid Edward MD Assigned Pulmonology Provider   12/12/21     Phone: [phone] Fax: [phone]         [address] [facility] [address] [facility] [address]    Leslie Clifford [facility] Pharmacist Pharmacist  12/13/21     Phone: [phone] Pager: [phone]         [address] [facility] [address]    Bonnie Walls RN Specialty Care Coordinator Hematology & Oncology  12/16/21     Phone: [phone] Pager: [phone]        Maye Edmondson Community Health Worker Primary Care - CC  4/18/22     Phone: [phone] Fax: [phone]                My Care Plans  Self Management and Treatment Plan  Goals and (Comments)   Goals        General     #1  Other - Care Gaps (pt-stated)      Notes - Note edited  5/9/2022  9:12 AM by Uriel Van, RN     Goal Statement: I will work with my PCP to close my care gaps by scheduling an eye exam, and scheduling a diabetic foot exam.  Date Goal set: 12/13/21  Barriers: The patient suffers from Parkinson.  Strengths: engaged in care coordination  Date to Achieve By: 6/13/23  Patient expressed understanding of goal: yes  Action steps to achieve this goal:  1. I will speak with my PCP about scheduling a diabetic foot exam.  2. I will make an appointment  for an annual eye exam.  3. I will ask my PCP about which immunization are appropriate for me.           #2  Functional (pt-stated)      Notes - Note edited  5/9/2022  9:11 AM by Uriel Van RN     Goal Statement: I will work on walking without the walker, and increasing my strength and stamina.  Date Goal set: 12/13/21  Barriers: suffers from parkinson's  Strengths: engaged in care coordination  Date to Achieve By: 6/13/23  Patient expressed understanding of goal: yes  Action steps to achieve this goal:  1. I will practice in the house walking without the walker. No longer using the walker as of 3/15/22.  2. I will go outside without the walker when I feel strong enough and have increased my strength and stamina.  3. I will frequently walk around inside the house to increase my strength and stamina.                 Action Plans on File:                       Advance Care Plans/Directives Type:   Advanced Directive - On File      My Medical and Care Information  Problem List   Patient Active Problem List   Diagnosis     Hemochromatosis     Czpwg-hhdkml-vqvz disease, chronic (H)     Advanced directives, counseling/discussion     Polyneuropathy     Status post total knee replacements     Status post bone marrow transplant (H)     Hyperlipidemia with target LDL less than 100     Atrial fibrillation (H)     Chronic rhinitis     Gallstones without obstruction of gallbladder     Long-term (current) use of anticoagulants [Z79.01]     Thyroid nodule     Type 2 diabetes mellitus with stage 2 chronic kidney disease, without long-term current use of insulin (H)     History of Hodgkin's lymphoma     History of irradiation, presenting hazards to health     Primary pulmonary hypertension (H)     Hereditary hemochromatosis (H)     Oropharyngeal dysphagia     Articulation disorder     Personal history of PE (pulmonary embolism)     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    Care Coordination Start Date: 11/5/2021   Frequency of Care Coordination: monthly     Form Last Updated: 05/09/2022

## 2022-05-09 NOTE — PROGRESS NOTES
Clinic Care Coordination Contact    Follow Up Progress Note      Assessment: The RN CC nurse care coordinator contacted the patient by phone for a follow up call.  The patient is doing very well and is walking without his walker both inside and outside.  The patient is concerned about his G-tube and who needs to be taking care of that.  The RN CC suggested that he see his primary care to be able to look at that.    Medication review was completed with the patient and marked as reviewed.  Health maintenance was reviewed and questions were answered with the patient.  The assessment for CKD was completed with the patient today as well as the social determinants of health and they were marked as reviewed.      Care Gaps:    Health Maintenance Due   Topic Date Due     ZOSTER IMMUNIZATION (2 of 3) 11/14/2016     DIABETIC FOOT EXAM  04/23/2020     EYE EXAM  01/01/2022       Care Gaps Last addressed on 5/9/2022, Care Gap Goal set: Yes and Patient accepted scheduling phone number for [phone]  to schedule independently     Goals addressed this encounter:    Goals Addressed                    This Visit's Progress       #1  Other - Care Gaps (pt-stated)         Goal Statement: I will work with my PCP to close my care gaps by scheduling an eye exam, and scheduling a diabetic foot exam.  Date Goal set: 12/13/21  Barriers: The patient suffers from Parkinson.  Strengths: engaged in care coordination  Date to Achieve By: 6/13/23  Patient expressed understanding of goal: yes  Action steps to achieve this goal:  1. I will speak with my PCP about scheduling a diabetic foot exam.  2. I will make an appointment for an annual eye exam.  3. I will ask my PCP about which immunization are appropriate for me.             #2  Functional (pt-stated)         Goal Statement: I will work on walking without the walker, and increasing my strength and stamina.  Date Goal set: 12/13/21  Barriers: suffers from parkinson's  Strengths: engaged in  care coordination  Date to Achieve By: 6/13/23  Patient expressed understanding of goal: yes  Action steps to achieve this goal:  1. I will practice in the house walking without the walker. No longer using the walker as of 3/15/22.  2. I will go outside without the walker when I feel strong enough and have increased my strength and stamina.  3. I will frequently walk around inside the house to increase my strength and stamina.                Intervention/Education provided during outreach: The RN MILA reviewed with the patient the exercises that he is doing to increase his strength and stamina.     Outreach Frequency: monthly        Plan:   1.  The patient will continue to walk around inside the house on days that it is raining or cold outside, and work on walking outside of the house on days that it is nice to increase his strength and stamina.  2.  The patient will make an appointment with his PCP in order to ask about his G-tube as well as his other items that are needing attention.  3.  The patient will take all medications as prescribed by the providers.    RN CC Nurse Care Coordinator will follow up in 6 weeks.  CHW will contact the patient in 4 weeks reviewing the goals as set above.            Uriel Zhou MSN, RN, PHN, CCM   Primary Care Clinical RN Care Coordinator  [facility]  5/9/2022   10:09 AM  [email or web address]  Office: [phone]

## 2022-05-10 ENCOUNTER — HOME INFUSION (PRE-WILLOW HOME INFUSION) (OUTPATIENT)
Dept: PHARMACY | Facility: CLINIC | Age: 72
End: 2022-05-10

## 2022-05-13 ENCOUNTER — DOCUMENTATION ONLY (OUTPATIENT)
Dept: ANTICOAGULATION | Facility: CLINIC | Age: 72
End: 2022-05-13
Payer: COMMERCIAL

## 2022-05-13 ENCOUNTER — TRANSFERRED RECORDS (OUTPATIENT)
Dept: HEALTH INFORMATION MANAGEMENT | Facility: CLINIC | Age: 72
End: 2022-05-13
Payer: COMMERCIAL

## 2022-05-13 DIAGNOSIS — I48.0 PAROXYSMAL ATRIAL FIBRILLATION (H): ICD-10-CM

## 2022-05-13 DIAGNOSIS — Z79.01 LONG TERM CURRENT USE OF ANTICOAGULANT THERAPY: Primary | ICD-10-CM

## 2022-05-13 LAB — INR (EXTERNAL): 2.5 (ref 0.9–1.1)

## 2022-05-13 NOTE — PROGRESS NOTES
ANTICOAGULATION  MANAGEMENT-Home Monitor Managed by Exception    Haresh Rock 72 year old male is on warfarin with therapeutic INR result. (Goal INR 2.0-3.0)    Recent labs: (last 7 days)     05/13/22  0844   INR 2.5*         Previous INR was Therapeutic    Medication, diet, health changes since last INR:chart reviewed; none identified    Contacted within the last 12 weeks by phone on 4/5/22      MINNIE Hutson was NOT contacted regarding therapeutic result today per home monitoring policy manage by exception agreement.   Current warfarin dose is to be continued:     Summary  As of 5/13/2022    Full warfarin instructions:  5 mg every Fri; 3.75 mg all other days   Next INR check:  5/20/2022           ?   Allyn Doe RN  Anticoagulation Clinic  5/13/2022    _______________________________________________________________________     Anticoagulation Episode Summary     Current INR goal:  2.0-3.0   TTR:  75.4 % (11.7 mo)   Target end date:  Indefinite   Send INR reminders to:  CHELSEA CHILEL    Indications    Long-term (current) use of anticoagulants [Z79.01] [Z79.01]  Atrial fibrillation (H) [I48.91]  Antiphospholipid antibody syndrome (H) (Resolved) [D68.61]  Personal history of DVT (deep vein thrombosis) (Resolved) [Z86.718]  Atrial fibrillation  unspecified type (H) (Resolved) [I48.91]  Paroxysmal atrial fibrillation (H) [I48.0]           Comments:  JESSICA rodas to manage by exception         Anticoagulation Care Providers     Provider Role Specialty Phone number    Anya Nowak MD Referring Family Medicine [phone]

## 2022-05-20 ENCOUNTER — TRANSFERRED RECORDS (OUTPATIENT)
Dept: HEALTH INFORMATION MANAGEMENT | Facility: CLINIC | Age: 72
End: 2022-05-20
Payer: COMMERCIAL

## 2022-05-20 ENCOUNTER — DOCUMENTATION ONLY (OUTPATIENT)
Dept: ANTICOAGULATION | Facility: CLINIC | Age: 72
End: 2022-05-20
Payer: COMMERCIAL

## 2022-05-20 DIAGNOSIS — Z79.01 LONG TERM CURRENT USE OF ANTICOAGULANT THERAPY: Primary | ICD-10-CM

## 2022-05-20 DIAGNOSIS — I48.0 PAROXYSMAL ATRIAL FIBRILLATION (H): ICD-10-CM

## 2022-05-20 DIAGNOSIS — I48.91 ATRIAL FIBRILLATION (H): ICD-10-CM

## 2022-05-20 LAB — INR (EXTERNAL): 2.3 (ref 0.9–1.1)

## 2022-05-20 NOTE — PROGRESS NOTES
ANTICOAGULATION  MANAGEMENT-Home Monitor Managed by Exception    Haresh EMILIE Rock 72 year old male is on warfarin with therapeutic INR result. (Goal INR 2.0-3.0)    Recent labs: (last 7 days)     05/20/22  0926   INR 2.3*         Previous INR was Therapeutic    Medication, diet, health changes since last INR:chart reviewed; none identified    Contacted within the last 12 weeks by phone on 04/05/2022      MINNIE Hutson was NOT contacted regarding therapeutic result today per home monitoring policy manage by exception agreement.   Current warfarin dose is to be continued:     Summary  As of 5/20/2022    Full warfarin instructions:  5 mg every Fri; 3.75 mg all other days   Next INR check:  5/27/2022           ?   Hari Saavedra RN  Anticoagulation Clinic  5/20/2022    _______________________________________________________________________     Anticoagulation Episode Summary     Current INR goal:  2.0-3.0   TTR:  75.4 % (11.7 mo)   Target end date:  Indefinite   Send INR reminders to:  CHELSEA CHILEL    Indications    Long-term (current) use of anticoagulants [Z79.01] [Z79.01]  Atrial fibrillation (H) [I48.91]  Antiphospholipid antibody syndrome (H) (Resolved) [D68.61]  Personal history of DVT (deep vein thrombosis) (Resolved) [Z86.718]  Atrial fibrillation  unspecified type (H) (Resolved) [I48.91]  Paroxysmal atrial fibrillation (H) [I48.0]           Comments:  JESSICA rodas to manage by exception         Anticoagulation Care Providers     Provider Role Specialty Phone number    Anya Nowak MD Referring Family Medicine [phone]

## 2022-05-27 ENCOUNTER — DOCUMENTATION ONLY (OUTPATIENT)
Dept: ANTICOAGULATION | Facility: CLINIC | Age: 72
End: 2022-05-27
Payer: COMMERCIAL

## 2022-05-27 ENCOUNTER — TRANSFERRED RECORDS (OUTPATIENT)
Dept: HEALTH INFORMATION MANAGEMENT | Facility: CLINIC | Age: 72
End: 2022-05-27
Payer: COMMERCIAL

## 2022-05-27 DIAGNOSIS — I48.0 PAROXYSMAL ATRIAL FIBRILLATION (H): ICD-10-CM

## 2022-05-27 DIAGNOSIS — Z79.01 LONG TERM CURRENT USE OF ANTICOAGULANT THERAPY: Primary | ICD-10-CM

## 2022-05-27 LAB — INR (EXTERNAL): 2.5 (ref 0.9–1.1)

## 2022-05-27 NOTE — PROGRESS NOTES
ANTICOAGULATION  MANAGEMENT-Home Monitor Managed by Exception    Hareshroger Rock 72 year old male is on warfarin with therapeutic INR result. (Goal INR 2.0-3.0)    Recent labs: (last 7 days)     05/27/22  0844   INR 2.5*         Previous INR was Therapeutic    Medication, diet, health changes since last INR:chart reviewed; none identified    Contacted within the last 12 weeks by phone on 4/5/22      MINNIE Hutson was NOT contacted regarding therapeutic result today per home monitoring policy manage by exception agreement.   Current warfarin dose is to be continued:     Summary  As of 5/27/2022    Full warfarin instructions:  5 mg every Fri; 3.75 mg all other days   Next INR check:  6/3/2022           ?   Allyn Doe RN  Anticoagulation Clinic  5/27/2022    _______________________________________________________________________     Anticoagulation Episode Summary     Current INR goal:  2.0-3.0   TTR:  75.4 % (11.7 mo)   Target end date:  Indefinite   Send INR reminders to:  CHELSEA CHILEL    Indications    Long-term (current) use of anticoagulants [Z79.01] [Z79.01]  Atrial fibrillation (H) [I48.91]  Antiphospholipid antibody syndrome (H) (Resolved) [D68.61]  Personal history of DVT (deep vein thrombosis) (Resolved) [Z86.718]  Atrial fibrillation  unspecified type (H) (Resolved) [I48.91]  Paroxysmal atrial fibrillation (H) [I48.0]           Comments:  JESSICA rodas to manage by exception         Anticoagulation Care Providers     Provider Role Specialty Phone number    Anya Nowak MD Referring Family Medicine [phone]

## 2022-06-03 ENCOUNTER — TRANSFERRED RECORDS (OUTPATIENT)
Dept: HEALTH INFORMATION MANAGEMENT | Facility: CLINIC | Age: 72
End: 2022-06-03

## 2022-06-03 ENCOUNTER — DOCUMENTATION ONLY (OUTPATIENT)
Dept: ANTICOAGULATION | Facility: CLINIC | Age: 72
End: 2022-06-03
Payer: COMMERCIAL

## 2022-06-03 DIAGNOSIS — I48.91 ATRIAL FIBRILLATION (H): ICD-10-CM

## 2022-06-03 DIAGNOSIS — I48.0 PAROXYSMAL ATRIAL FIBRILLATION (H): ICD-10-CM

## 2022-06-03 DIAGNOSIS — Z79.01 LONG TERM CURRENT USE OF ANTICOAGULANT THERAPY: Primary | ICD-10-CM

## 2022-06-03 LAB — INR (EXTERNAL): 2.8 (ref 0.9–1.1)

## 2022-06-03 NOTE — PROGRESS NOTES
ANTICOAGULATION  MANAGEMENT-Home Monitor Managed by Exception    Haresh EMILIE Rock 72 year old male is on warfarin with therapeutic INR result. (Goal INR 2.0-3.0)    Recent labs: (last 7 days)     06/03/22  0928   INR 2.8*         Previous INR was Therapeutic    Medication, diet, health changes since last INR:chart reviewed; none identified    Contacted within the last 12 weeks by phone on 4/5/22          MINNIE Hutson was NOT contacted regarding therapeutic result today per home monitoring policy manage by exception agreement.   Current warfarin dose is to be continued:     Summary  As of 6/3/2022    Full warfarin instructions:  5 mg every Fri; 3.75 mg all other days   Next INR check:  6/17/2022           ?   Blanca Rodriguez RN  Anticoagulation Clinic  6/3/2022    _______________________________________________________________________     Anticoagulation Episode Summary     Current INR goal:  2.0-3.0   TTR:  75.4 % (11.7 mo)   Target end date:  Indefinite   Send INR reminders to:  CHELSEA CHILEL    Indications    Long-term (current) use of anticoagulants [Z79.01] [Z79.01]  Atrial fibrillation (H) [I48.91]  Antiphospholipid antibody syndrome (H) (Resolved) [D68.61]  Personal history of DVT (deep vein thrombosis) (Resolved) [Z86.718]  Atrial fibrillation  unspecified type (H) (Resolved) [I48.91]  Paroxysmal atrial fibrillation (H) [I48.0]           Comments:  JESSICA rodas to manage by exception         Anticoagulation Care Providers     Provider Role Specialty Phone number    Anya Nowak MD Referring Family Medicine [phone]

## 2022-06-08 ENCOUNTER — TELEPHONE (OUTPATIENT)
Dept: FAMILY MEDICINE | Facility: CLINIC | Age: 72
End: 2022-06-08

## 2022-06-08 ENCOUNTER — MYC MEDICAL ADVICE (OUTPATIENT)
Dept: FAMILY MEDICINE | Facility: CLINIC | Age: 72
End: 2022-06-08

## 2022-06-08 ENCOUNTER — OFFICE VISIT (OUTPATIENT)
Dept: FAMILY MEDICINE | Facility: CLINIC | Age: 72
End: 2022-06-08
Payer: COMMERCIAL

## 2022-06-08 VITALS
SYSTOLIC BLOOD PRESSURE: 100 MMHG | TEMPERATURE: 97.4 F | OXYGEN SATURATION: 98 % | BODY MASS INDEX: 22.18 KG/M2 | HEART RATE: 73 BPM | DIASTOLIC BLOOD PRESSURE: 50 MMHG | WEIGHT: 159 LBS

## 2022-06-08 DIAGNOSIS — E11.22 TYPE 2 DIABETES MELLITUS WITH STAGE 2 CHRONIC KIDNEY DISEASE, WITHOUT LONG-TERM CURRENT USE OF INSULIN (H): ICD-10-CM

## 2022-06-08 DIAGNOSIS — G20.A1 PARKINSON'S DISEASE (H): ICD-10-CM

## 2022-06-08 DIAGNOSIS — N18.2 TYPE 2 DIABETES MELLITUS WITH STAGE 2 CHRONIC KIDNEY DISEASE, WITHOUT LONG-TERM CURRENT USE OF INSULIN (H): ICD-10-CM

## 2022-06-08 DIAGNOSIS — R06.02 SOB (SHORTNESS OF BREATH): ICD-10-CM

## 2022-06-08 DIAGNOSIS — Z93.1 GASTROSTOMY TUBE IN PLACE (H): Primary | ICD-10-CM

## 2022-06-08 DIAGNOSIS — D69.6 THROMBOCYTOPENIA (H): ICD-10-CM

## 2022-06-08 DIAGNOSIS — D68.61 ANTIPHOSPHOLIPID ANTIBODY SYNDROME (H): ICD-10-CM

## 2022-06-08 PROBLEM — E43 SEVERE PROTEIN-CALORIE MALNUTRITION (H): Status: RESOLVED | Noted: 2021-11-10 | Resolved: 2022-06-08

## 2022-06-08 LAB
BASOPHILS # BLD AUTO: 0 10E3/UL (ref 0–0.2)
BASOPHILS NFR BLD AUTO: 0 %
EOSINOPHIL # BLD AUTO: 0.1 10E3/UL (ref 0–0.7)
EOSINOPHIL NFR BLD AUTO: 1 %
ERYTHROCYTE [DISTWIDTH] IN BLOOD BY AUTOMATED COUNT: 12.2 % (ref 10–15)
HCT VFR BLD AUTO: 42.9 % (ref 40–53)
HGB BLD-MCNC: 15 G/DL (ref 13.3–17.7)
IMM GRANULOCYTES # BLD: 0 10E3/UL
IMM GRANULOCYTES NFR BLD: 0 %
LYMPHOCYTES # BLD AUTO: 2.5 10E3/UL (ref 0.8–5.3)
LYMPHOCYTES NFR BLD AUTO: 25 %
MCH RBC QN AUTO: 34.1 PG (ref 26.5–33)
MCHC RBC AUTO-ENTMCNC: 35 G/DL (ref 31.5–36.5)
MCV RBC AUTO: 98 FL (ref 78–100)
MONOCYTES # BLD AUTO: 0.8 10E3/UL (ref 0–1.3)
MONOCYTES NFR BLD AUTO: 8 %
NEUTROPHILS # BLD AUTO: 6.6 10E3/UL (ref 1.6–8.3)
NEUTROPHILS NFR BLD AUTO: 66 %
NRBC # BLD AUTO: 0 10E3/UL
NRBC BLD AUTO-RTO: 0 /100
PLATELET # BLD AUTO: 90 10E3/UL (ref 150–450)
RBC # BLD AUTO: 4.4 10E6/UL (ref 4.4–5.9)
WBC # BLD AUTO: 10 10E3/UL (ref 4–11)

## 2022-06-08 PROCEDURE — 99214 OFFICE O/P EST MOD 30 MIN: CPT | Performed by: FAMILY MEDICINE

## 2022-06-08 PROCEDURE — 36415 COLL VENOUS BLD VENIPUNCTURE: CPT | Performed by: FAMILY MEDICINE

## 2022-06-08 PROCEDURE — 85025 COMPLETE CBC W/AUTO DIFF WBC: CPT | Performed by: FAMILY MEDICINE

## 2022-06-08 RX ORDER — WARFARIN SODIUM 2.5 MG/1
TABLET ORAL
Qty: 180 TABLET | Refills: 1 | Status: SHIPPED | OUTPATIENT
Start: 2022-06-08 | End: 2022-12-30

## 2022-06-08 RX ORDER — AMOXICILLIN 500 MG/1
2000 CAPSULE ORAL ONCE
COMMUNITY
Start: 2022-06-08

## 2022-06-08 RX ORDER — ROSUVASTATIN CALCIUM 40 MG/1
40 TABLET, COATED ORAL AT BEDTIME
Qty: 90 TABLET | Refills: 0 | Status: SHIPPED | OUTPATIENT
Start: 2022-06-08 | End: 2022-09-09

## 2022-06-08 NOTE — PROGRESS NOTES
Assessment & Plan     (Z93.1) Gastrostomy tube in place (H)  (primary encounter diagnosis)  Comment: likely needs replacement   Plan: CBC with platelets and differential    Surgery referral     (G20) Parkinson's disease (H)  Comment: stable on medications and with G tube feeds due to swallowing difficulty   Plan: follow-up with neurology as planned     (R06.02) SOB (shortness of breath)  Comment: unclear etiology - under care of pulmonology   Plan: XR Chest 2 Views, CBC with platelets and         differential        Consider further cardiac evaluation if symptoms persist; follow-up as needed     (E11.22,  N18.2) Type 2 diabetes mellitus with stage 2 chronic kidney disease, without long-term current use of insulin (H)  Comment: diet controlled   Plan: OPTOMETRY REFERRAL          (D68.61) Antiphospholipid antibody syndrome (H)  Plan: warfarin ANTICOAGULANT (COUMADIN) 2.5 MG tablet        Continue chronic anticoagulation under surveillance of protime clinic with goal INR 2 - 3 indefinitely     (D69.6) Thrombocytopenia (H)  Comment: Chronic; 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    Anya Nowak MD  [facility] FRIRYPATTI Hutson is a 72 year old who presents for the following health issues     History of Present Illness       Reason for visit:  Check G-tube bleeding &shortness of breath    He eats 0-1 servings of fruits and vegetables daily.He consumes 1 sweetened beverage(s) daily.He exercises with enough effort to increase his heart rate 9 or less minutes per day.  He exercises with enough effort to increase his heart rate 3 or less days per week.   He is taking medications regularly.     Haresh Rock is a 72 year old male who presents with concerns about his  G tube placed 8 months ago for malnutrition related to progressive Parkinson's disease and difficulty swallowing. He has overall been doing much better, but has some drainage from the G tube. He wonders if he might get one with a button so it's more discrete and smaller. Symptom onset has been gradual, worsening for a time period of months. Severity is described as mild. Course of his symptoms over time is worsening.     He also feels shortness of breath, but wonders if this is related to his Parkinson's. He is also under care of pulmonology with recent imaging due to Hx of aspiration and elevated ESR.           Review of Systems   CONSTITUTIONAL: NEGATIVE for fever, chills, change in weight  INTEGUMENTARY/SKIN: NEGATIVE for worrisome rashes, moles or lesions  ENT/MOUTH: NEGATIVE for ear, mouth and throat problems  RESP:as above  CV: NEGATIVE for chest pain, palpitations or peripheral edema  NEURO: Parkinson's   HEME/ALLERGY/IMMUNE: see above   PSYCHIATRIC: NEGATIVE for changes in mood or affect      Objective    /50 (BP Location: Left arm, Patient Position: Sitting, Cuff Size: Adult Regular)   Pulse 73   Temp 97.4  F (36.3  C) (Oral)   Wt 72.1 kg (159 lb)   SpO2 98%   BMI 22.18 kg/m    Body mass index is 22.18 kg/m .  Physical Exam   GENERAL: healthy, alert and no distress  NECK: no adenopathy, no asymmetry, masses, or scars and thyroid normal to palpation  RESP: lungs clear to auscultation - no rales, rhonchi or wheezes  CV: regular rate and rhythm, normal S1 S2, no S3 or S4, no murmur, click or rub, no peripheral edema   ABDOMEN: soft, nontender and G tube in place   MS: no gross musculoskeletal defects noted, no edema  NEURO: mentation intact, flat faces and mild rigidity   PSYCH: mentation appears normal, affect normal/bright    Documentation Only on 06/03/2022   Component Date Value Ref Range Status     INR (External) 06/03/2022 2.8 (A) 0.9 - 1.1 Final     Results for orders placed or performed in  visit on 06/08/22 (from the past 24 hour(s))   CBC with platelets and differential    Narrative    The following orders were created for panel order CBC with platelets and differential.  Procedure                               Abnormality         Status                     ---------                               -----------         ------                     CBC with platelets and d...[[id number]]                      In process                   Please view results for these tests on the individual orders.     *Note: Due to a large number of results and/or encounters for the requested time period, some results have not been displayed. A complete set of results can be found in Results Review.

## 2022-06-08 NOTE — PATIENT INSTRUCTIONS
Get the shingles vaccine, called Shingrix (given as 2 shots, 2 to 6 months apart), even if you have already had the Zostavax vaccine. Discuss getting the Shingix vaccine from your pharmacist, or schedule an ancillary shot visit here. Some insurances do not cover the cost of these vaccines.       Please schedule your yearly eye examination.

## 2022-06-08 NOTE — RESULT ENCOUNTER NOTE
David Hutson,    Your platelets are stable. Follow-up with Dr. Miller as planned.     You do not have anemia.     Anya Nowak MD

## 2022-06-08 NOTE — TELEPHONE ENCOUNTER
Please call patient: I recommend that you follow-up with Dr. Finney, the general surgeon who performed your surgery, about your G tube. I have made a referral. You can call [phone] to make an appointment.   Anya Nowak MD

## 2022-06-09 ASSESSMENT — ENCOUNTER SYMPTOMS
POSTURAL DYSPNEA: 0
SHORTNESS OF BREATH: 1
DYSPNEA ON EXERTION: 0
HEMOPTYSIS: 0
BLOATING: 0
SNORES LOUDLY: 0
BOWEL INCONTINENCE: 0
SPUTUM PRODUCTION: 0
JAUNDICE: 0
DIARRHEA: 0
HEARTBURN: 0
ABDOMINAL PAIN: 1
NAUSEA: 0
COUGH: 0
WHEEZING: 0
COUGH DISTURBING SLEEP: 0
VOMITING: 0
RECTAL PAIN: 0
CONSTIPATION: 0
BLOOD IN STOOL: 0

## 2022-06-10 ENCOUNTER — DOCUMENTATION ONLY (OUTPATIENT)
Dept: ANTICOAGULATION | Facility: CLINIC | Age: 72
End: 2022-06-10

## 2022-06-10 ENCOUNTER — OFFICE VISIT (OUTPATIENT)
Dept: SURGERY | Facility: CLINIC | Age: 72
End: 2022-06-10
Attending: FAMILY MEDICINE
Payer: COMMERCIAL

## 2022-06-10 ENCOUNTER — TRANSFERRED RECORDS (OUTPATIENT)
Dept: HEALTH INFORMATION MANAGEMENT | Facility: CLINIC | Age: 72
End: 2022-06-10

## 2022-06-10 VITALS
OXYGEN SATURATION: 97 % | TEMPERATURE: 97.4 F | HEART RATE: 77 BPM | DIASTOLIC BLOOD PRESSURE: 62 MMHG | SYSTOLIC BLOOD PRESSURE: 113 MMHG

## 2022-06-10 DIAGNOSIS — Z79.01 LONG TERM CURRENT USE OF ANTICOAGULANT THERAPY: Primary | ICD-10-CM

## 2022-06-10 DIAGNOSIS — Z93.1 GASTROSTOMY TUBE IN PLACE (H): ICD-10-CM

## 2022-06-10 DIAGNOSIS — I48.0 PAROXYSMAL ATRIAL FIBRILLATION (H): ICD-10-CM

## 2022-06-10 LAB — INR (EXTERNAL): 2.1 (ref 0.9–1.1)

## 2022-06-10 PROCEDURE — 99203 OFFICE O/P NEW LOW 30 MIN: CPT | Mod: 25 | Performed by: SURGERY

## 2022-06-10 PROCEDURE — 43762 RPLC GTUBE NO REVJ TRC: CPT | Performed by: SURGERY

## 2022-06-10 ASSESSMENT — PAIN SCALES - GENERAL: PAINLEVEL: NO PAIN (0)

## 2022-06-10 NOTE — LETTER
6/10/2022       RE: Haresh Rock  [address] [place] [address]     Dear Colleague,    Thank you for referring your patient, Haresh Rock, to the [facility] SUR[facility] [place] at [facility]. Please see a copy of my visit note below.    General Surgery Clinic:    HISTORY OF PRESENT ILLNESS:  Haresh Rock is a 72-year-old man with Parkinson's disease.  The patient can no longer swallow.  The patient was evaluated by the Thoracic Surgery Medical Team with Dr. Edward demonstrating aspiration.  The patient has a G-tube placed by Dr. Finney.  The patient's G-tube is now leaking more than usual, and abundant granulation tissue is present at the G-tube site.  The patient is here for treatment of the granulation tissue and G-tube change.  The patient is  very interested to pursue possible WILTON-KEY G-tube, as he has difficulties with the long G-tube catching on things when he is active around the house.     ROS: a ten point OS is negative.     PAST MEDICAL HISTORY:  Past Medical History:   Diagnosis Date     Abnormal dopamine scan (DaTSCAN) 2020 11/04/2020    [phone] 11/3/2020   Narrative & Impression   Examination: Nuclear medicine DATscan for Dopamine Receptor Localization.   Examination: NM Brain Image Tomographic (SPECT) DATscan   Date: 11/3/2020 3:21 PM   Indication: Parkinsonism   Comparison: None   Additional Information: none   Interfering Medications: None   Technique:   The patient initially received 1 ml of Lugol's solution orally prior     Antiphospholipid antibody syndrome (H)     Ravi's, noted negative per testing re-done in 2008 in hematology note 01/13/2009     Articulation disorder 09/23/2020     Atrial fibrillation (H) 04/2011     Benign prostatic hyperplasia with urinary retention      Cirrhosis (H)      CKD (chronic kidney disease) stage 2, GFR 60-89 ml/min      Diabetes mellitus type II     steroid induced     DJD  (degenerative joint disease) of knee     SHAWN, worse on right     DVT (deep venous thrombosis) (H)      ED (erectile dysfunction)      Gallbladder polyp 05/2010     Inowd-Qbyvqs-Dopr Disease 2007    BMT     Hemochromatosis      Hodgkin's disease NOS     5 cycles of ABVD in 2011     HTN (hypertension)      Hyperlipidaemia LDL goal <100      Multiple thyroid nodules     benign      Myeloproliferative disorder (H)     atypical, [facility]     Parkinson disease (H) 09/24/2020     PE (pulmonary embolism) 11/2004     Polyneuropathy      Pressure ulcer      Primary pulmonary hypertension (H)      Severe protein-calorie malnutrition (H) 11/10/2021     Small bowel obstruction (H) 10/29/2021     Thrombocytopenia (H) 06/08/2021     PAST SURGICAL HISTORY:  Past Surgical History:   Procedure Laterality Date     ANESTHESIA CARDIOVERSION  04/21/2011    Procedure:ANESTHESIA CARDIOVERSION; Surgeon:GENERIC ANESTHESIA PROVIDER; Location:[facility] OR     ARTHROSCOPY KNEE RT/LT      LT     CATARACT IOL, RT/LT  2017     COLONOSCOPY  10/16/2012    Procedure: COLONOSCOPY;  Colonoscopy, screening;  Surgeon: Reg Feliciano MD;  Location: [facility] OR     COLONOSCOPY N/A 04/14/2021    Procedure: COLONOSCOPY;  Surgeon: Reg Holm MD;  Location: [facility] GI     ESOPHAGOSCOPY, GASTROSCOPY, DUODENOSCOPY (EGD), COMBINED N/A 10/07/2020    Procedure: ESOPHAGOGASTRODUODENOSCOPY, WITH BIOPSY;  Surgeon: Raul Sawyer DO;  Location: [facility] OR     H ABLATION FOCAL AFIB  03/05/2013    PVI     H ABLATION FOCAL AFIB  01/01/2018     HC BONE MARROW/STEM TRANSPLANT ALLOGENIC  05/08/2007     INSERT PORT VASCULAR ACCESS       IR GASTROSTOMY TUBE PERCUTANEOUS PLCMNT  10/25/2021     JOINT REPLACEMTN, KNEE RT/LT  03/28/2012    SHAWN     LAPAROSCOPY DIAGNOSTIC (GENERAL) N/A 10/29/2021    Procedure: LAPAROSCOPY, DIAGNOSTIC, TAKEDOWN OF MALPOSITIONED GASTROSTOMY TUBE, INSERTION OF GASTROSTOMY TUBE, SMALL BOWEL RESECTION X1, PRIMARY REPAIR OF SMALL BOWEL ENTEROTOMY, ABDOMINAL  WASHOUT, TAP BLOCK;  Surgeon: Leif Finney DO;  Location: [facility] OR     PEG TUBE PLACEMENT  10/25/2021     VASECTOMY  1990     MEDICATIONS:  Current Outpatient Medications   Medication     amoxicillin (AMOXIL) 500 MG capsule     carbidopa-levodopa (SINEMET)  MG tablet     flecainide (TAMBOCOR) 50 MG tablet     fluticasone (FLONASE) 50 MCG/ACT nasal spray     loratadine (CLARITIN REDITABS) 10 MG ODT     Metoprolol Succinate (KAPSPARGO) 25 MG CS24     Multiple Vitamins-Minerals (MULTIVITAMIN GUMMIES ADULT PO)     NONFORMULARY     omeprazole (PRILOSEC) 20 MG DR capsule     PREVIDENT 5000 DRY MOUTH 1.1 % GEL topical gel     rosuvastatin (CRESTOR) 40 MG tablet     vitamin D3 (CHOLECALCIFEROL) 50 mcg (2000 units) tablet     warfarin ANTICOAGULANT (COUMADIN) 2.5 MG tablet     Current Facility-Administered Medications   Medication     ondansetron (ZOFRAN) injection 4 mg     ALLERGIES:  Allergies   Allergen Reactions     Seasonal Allergies      SOCIAL HISTORY:  Social History     Socioeconomic History     Marital status:      Spouse name: Angelica     Number of children: 2     Years of education: 21     Highest education level: None   Occupational History     Occupation:      Employer: [employer]     Employer: [employer]   Tobacco Use     Smoking status: Never Smoker     Smokeless tobacco: Never Used   Substance and Sexual Activity     Alcohol use: No     Drug use: No     Sexual activity: Not Currently     Partners: Female   Other Topics Concern     Parent/sibling w/ CABG, MI or angioplasty before 65F 55M? No   Social History Narrative     about 50 yrs        Wife has some health issues - back pain - second knee replaced    She had gastric bypass and a fib and bad mitral valve        Son in [place]    Daughter in [facility] with 10 yo son.         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[employer]        Born and raised in [place]              FAMILY HISTORY:  Family  History   Problem Relation Age of Onset     Hypertension Mother      Cerebrovascular Disease Mother      Breast Cancer Mother      Arrhythmia Brother      Arrhythmia Sister         supraventricular tachycardia     Thyroid Disease Sister      Other - See Comments Son         lives in [place]. no kids and not .     Obsessive Compulsive Disorder Son      Depression Son      Eating Disorder Son         eats when depressed     Obesity Son      Thyroid Cancer Daughter         had thyroid removed     Bipolar Disorder Daughter      Other - See Comments Daughter         lives in [place] - single with one son 9  yrs     Other - See Comments Sister      Alzheimer Disease Father      Diabetes Paternal Aunt      Gastrointestinal Disease Maternal Grandmother         colitis      Parkinsonism Other         2 cousins with parkinson     C.A.D. No family hx of      PHYSICAL EXAM:  Vital Signs: /62   Pulse 77   Temp 97.4  F (36.3  C) (Oral)   SpO2 97%   GEN: Haresh Rock was examined in the standing and supine position.  The exam findings are significant for the patient's Parkinson's disease.  ABD: The abdomen is soft and nontender.  The area of the G-tube has abundant granulation tissue with approximately 2 x 2 x 1 cm of granulation tissue which is bleeding and covered with mucus.  This area was treated with silver nitrate, and this did not irritate the patient's skin.  The patient's G-tube was exchanged, excellent return of gastric contents and no difficulty in the G-tube exchange, and the patient tolerated that well.    EXT: warm and well perfused.     IMPRESSION:  Haresh Rock would like to pursue WILTON-KEY tube placement.  I do not think this would be appropriate with the amount of with granulation tissue currently present.  The granulation tissue will be treated with the silver nitrate sticks on an every third day basis.  The patient will return to the Surgery Clinic in approximately 30 days for  additional treatment of the granulation tissue and possible change-out to a WILTON-KEY tube.      Answers for HPI/ROS submitted by the patient on 6/9/2022  General Symptoms: No  Skin Symptoms: No  HENT Symptoms: No  EYE SYMPTOMS: No  HEART SYMPTOMS: No  LUNG SYMPTOMS: Yes  INTESTINAL SYMPTOMS: Yes  URINARY SYMPTOMS: No  REPRODUCTIVE SYMPTOMS: No  SKELETAL SYMPTOMS: No  BLOOD SYMPTOMS: No  NERVOUS SYSTEM SYMPTOMS: No  MENTAL HEALTH SYMPTOMS: No  Cough: No  Sputum or phlegm: No  Coughing up blood: No  Difficulty breating or shortness of breath: Yes  Snoring: No  Wheezing: No  Difficulty breathing on exertion: No  Nighttime Cough: No  Difficulty breathing when lying flat: No  Heart burn or indigestion: No  Nausea: No  Vomiting: No  Abdominal pain: Yes  Bloating: No  Constipation: No  Diarrhea: No  Blood in stool: No  Black stools: No  Rectal or Anal pain: No  Fecal incontinence: No  Yellowing of skin or eyes: No  Vomit with blood: No  Change in stools: No      Sincerely,    Reg Cantu MD

## 2022-06-10 NOTE — PROGRESS NOTES
08/03/21 1300   Signing Clinician's Name / Credentials   Signing clinician's name / credentials Charles Villarreal OTR/L, MSCS   Session Number   Session Number 4   Authorization status [employer]   Progress/Recertification   Recertification Due 09/20/21    OT Goal 1   Goal Identifier Patient and Family Home Programming   Goal Description Pt and family will verbalize understanding and demonstrate appropriate level of support through utilization and compliance of home programming that pt requires for home management, ADLs, and community mobility.    Goal Progress Pt working on FM skills at home after home program training   Target Date 09/20/21    OT Goal 2   Goal Identifier Adaptive Equipment   Goal Description Patient to verbalize understanding of and demonstrate use of 3-5 pieces of adaptive equipment and/or adapted techniques to increase his independence and safety with ADL/IADLs.   Goal Progress Training in fall prevention strategies for home   Target Date 09/20/21    OT Goal 3   Goal Identifier Community Mobility   Goal Description Patient will demonstrate WFLs UE motor and visual reaction time for return to independent community mobility (crossing the street, driving, alternative mobility, etc.) by scoring WFL on all modes of the Dynavision.    Goal Progress Pt working on reaction time game on his iPAD for home programming   Target Date 09/20/21   OT Goal 4   Goal Identifier Fatigue Management   Goal Description Patient will increase awareness and identify 2-3 energy conservation/work simplification techniques that he will utilize for increased ADL/IADL independence, performance, and safety.   Goal Progress training in DME to increase safety and manage fatigue   Target Date 09/20/21   OT Goal 5   Goal Identifier Cognitive Performance Test   Goal Description Patient and family to verbalize understanding of Cognitive Performance Test results and recommendations and identify 3-5 strategies to  "increase patient's safety in his home setting.   Goal Progress not completed   Target Date 09/20/21    OT Goal 6   Goal Identifier Memory and Attention    Goal Description Patient will identify and demonstrate 2-3 memory compensation techniques to increase independence and safety with I/ADL tasks.   Goal Progress Per assessment with Contextual Memory Test pt scores WFL and pt using grouping and context Ind. Scores in severe impairment range with complex problem solving   Target Date 09/20/21   Subjective Report   Subjective Report When I was at the other clinic this morning they found that my blood pressure was low and I had to have 600 cc of fluids while I was there. I have surgery coming on the 9th.   Objective Measure 5   Objective Measure Contextual Memory Test-Restaurant Version- no context given   Details Immediate recall: 13/20; WFL  Delayed recall: 13/20; WFL; used grouping and context as strategies ind   Objective Measure 6   Objective Measure Dynavision   Details Mode A (self-paced) 48, 47 and  47hits/min ( BNL- norm for up to ages 60-70 yo male is 31.7-41.3 hits/30 sec); 1.5 \" paced lights: 51 hits/min no norms   Cognitive Skills   Cognitive Skills Intervention 1st 15 Minutes Timed (29166) 15   Skilled Intervention Skilled assessment of memory using the Contextual Memory Test. F/u on strategies pt currently using and assessment of pt's perception of memory problems and insight   Treatment Detail See score above. Assessment of what pt uses for strategies to aid memory.   Progress goals progressing   Patient Response I save stuff in the notes bia and I also take a picture of things such as something for the grocery store   Cognitive Skills Intervention Addl Minutes (27043) 8   Therapeutic Activity   Therapeutic Activity Minutes (34963) 15   Skilled Intervention Skilled training in UE coordination, visual scanning and balance and endurance using the Dynavision visuomotor training device.   Patient Response " Pt shows eyehand coordination bia he loaded on his ipad Pro and able to get 39 hits/30 sec.   Treatment Detail Encouraged pt to continue with is ipad HEP and play a few rounds daily to challenge each hand   Progress goals progressing   Education   Learner Patient   Readiness Acceptance   Method Explanation;Demonstration   Response Verbalizes understanding;Demonstrates understanding;Needs reinforcement   Plan   Plan for next session How did bladder surgery go?; F/u did pt use memory strategy training, f/u R UE stretches and coord HEP;    Comments   Comments 9/20/21 Discharge Addendum: Pt did not return for further therapy after his scheduled surgery and so is discharged. He was progressing toward goals as noted above.Charles Villarreal, OTR/L   Total Session Time   Timed Code Treatment Minutes 38   Total Treatment Time (sum of timed and untimed services) 38

## 2022-06-10 NOTE — NURSING NOTE
Chief Complaint   Patient presents with     RECHECK     Replacement of G-tube       Vitals:    06/10/22 1140   BP: 113/62   Pulse: 77   Temp: 97.4  F (36.3  C)   TempSrc: Oral   SpO2: 97%       There is no height or weight on file to calculate BMI.          SOL BISHOP EMT

## 2022-06-10 NOTE — PROGRESS NOTES
General Surgery Clinic:    HISTORY OF PRESENT ILLNESS:  Haresh Rock is a 72-year-old man with Parkinson's disease.  The patient can no longer swallow.  The patient was evaluated by the Thoracic Surgery Medical Team with Dr. Edward demonstrating aspiration.  The patient has a G-tube placed by Dr. Finney.  The patient's G-tube is now leaking more than usual, and abundant granulation tissue is present at the G-tube site.  The patient is here for treatment of the granulation tissue and G-tube change.  The patient is  very interested to pursue possible WILTON-KEY G-tube, as he has difficulties with the long G-tube catching on things when he is active around the house.     ROS: a ten point OS is negative.     PAST MEDICAL HISTORY:  Past Medical History:   Diagnosis Date    Abnormal dopamine scan (DaTSCAN) 2020 11/04/2020    [phone] 11/3/2020   Narrative & Impression   Examination: Nuclear medicine DATscan for Dopamine Receptor Localization.   Examination: NM Brain Image Tomographic (SPECT) DATscan   Date: 11/3/2020 3:21 PM   Indication: Parkinsonism   Comparison: None   Additional Information: none   Interfering Medications: None   Technique:   The patient initially received 1 ml of Lugol's solution orally prior    Antiphospholipid antibody syndrome (H)     Ravi's, noted negative per testing re-done in 2008 in hematology note 01/13/2009    Articulation disorder 09/23/2020    Atrial fibrillation (H) 04/2011    Benign prostatic hyperplasia with urinary retention     Cirrhosis (H)     CKD (chronic kidney disease) stage 2, GFR 60-89 ml/min     Diabetes mellitus type II     steroid induced    DJD (degenerative joint disease) of knee     SHAWN, worse on right    DVT (deep venous thrombosis) (H)     ED (erectile dysfunction)     Gallbladder polyp 05/2010    Mwbbv-Gairbu-Kkxs Disease 2007    BMT    Hemochromatosis     Hodgkin's disease NOS     5 cycles of ABVD in 2011    HTN (hypertension)     Hyperlipidaemia LDL goal  <100     Multiple thyroid nodules     benign     Myeloproliferative disorder (H)     atypical, [facility]    Parkinson disease (H) 09/24/2020    PE (pulmonary embolism) 11/2004    Polyneuropathy     Pressure ulcer     Primary pulmonary hypertension (H)     Severe protein-calorie malnutrition (H) 11/10/2021    Small bowel obstruction (H) 10/29/2021    Thrombocytopenia (H) 06/08/2021        PAST SURGICAL HISTORY:  Past Surgical History:   Procedure Laterality Date    ANESTHESIA CARDIOVERSION  04/21/2011    Procedure:ANESTHESIA CARDIOVERSION; Surgeon:GENERIC ANESTHESIA PROVIDER; Location:[facility] OR    ARTHROSCOPY KNEE RT/LT      LT    CATARACT IOL, RT/LT  2017    COLONOSCOPY  10/16/2012    Procedure: COLONOSCOPY;  Colonoscopy, screening;  Surgeon: Reg Feliciano MD;  Location: [facility] OR    COLONOSCOPY N/A 04/14/2021    Procedure: COLONOSCOPY;  Surgeon: Reg Holm MD;  Location: [facility]    ESOPHAGOSCOPY, GASTROSCOPY, DUODENOSCOPY (EGD), COMBINED N/A 10/07/2020    Procedure: ESOPHAGOGASTRODUODENOSCOPY, WITH BIOPSY;  Surgeon: Raul Sawyer DO;  Location: [facility] OR    H ABLATION FOCAL AFIB  03/05/2013    PVI    H ABLATION FOCAL AFIB  01/01/2018    HC BONE MARROW/STEM TRANSPLANT ALLOGENIC  05/08/2007    INSERT PORT VASCULAR ACCESS      IR GASTROSTOMY TUBE PERCUTANEOUS PLCMNT  10/25/2021    JOINT REPLACEMTN, KNEE RT/LT  03/28/2012    SHAWN    LAPAROSCOPY DIAGNOSTIC (GENERAL) N/A 10/29/2021    Procedure: LAPAROSCOPY, DIAGNOSTIC, TAKEDOWN OF MALPOSITIONED GASTROSTOMY TUBE, INSERTION OF GASTROSTOMY TUBE, SMALL BOWEL RESECTION X1, PRIMARY REPAIR OF SMALL BOWEL ENTEROTOMY, ABDOMINAL WASHOUT, TAP BLOCK;  Surgeon: Leif Finney DO;  Location: [facility] OR    PEG TUBE PLACEMENT  10/25/2021    VASECTOMY  1990        MEDICATIONS:  Current Outpatient Medications   Medication    amoxicillin (AMOXIL) 500 MG capsule    carbidopa-levodopa (SINEMET)  MG tablet    flecainide (TAMBOCOR) 50 MG tablet    fluticasone (FLONASE) 50  MCG/ACT nasal spray    loratadine (CLARITIN REDITABS) 10 MG ODT    Metoprolol Succinate (KAPSPARGO) 25 MG CS24    Multiple Vitamins-Minerals (MULTIVITAMIN GUMMIES ADULT PO)    NONFORMULARY    omeprazole (PRILOSEC) 20 MG DR capsule    PREVIDENT 5000 DRY MOUTH 1.1 % GEL topical gel    rosuvastatin (CRESTOR) 40 MG tablet    vitamin D3 (CHOLECALCIFEROL) 50 mcg (2000 units) tablet    warfarin ANTICOAGULANT (COUMADIN) 2.5 MG tablet     Current Facility-Administered Medications   Medication    ondansetron (ZOFRAN) injection 4 mg        ALLERGIES:  Allergies   Allergen Reactions    Seasonal Allergies         SOCIAL HISTORY:  Social History     Socioeconomic History    Marital status:      Spouse name: Angelica    Number of children: 2    Years of education: 21    Highest education level: None   Occupational History    Occupation:      Employer: [employer]     Employer: [employer]   Tobacco Use    Smoking status: Never Smoker    Smokeless tobacco: Never Used   Substance and Sexual Activity    Alcohol use: No    Drug use: No    Sexual activity: Not Currently     Partners: Female   Other Topics Concern    Parent/sibling w/ CABG, MI or angioplasty before 65F 55M? No   Social History Narrative     about 50 yrs        Wife has some health issues - back pain - second knee replaced    She had gastric bypass and a fib and bad mitral valve        Son in [place]    Daughter in [facility] with 8 yo son.         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[employer]        Born and raised in [facility]                FAMILY HISTORY:  Family History   Problem Relation Age of Onset    Hypertension Mother     Cerebrovascular Disease Mother     Breast Cancer Mother     Arrhythmia Brother     Arrhythmia Sister         supraventricular tachycardia    Thyroid Disease Sister     Other - See Comments Son         lives in [place]. no kids and not .    Obsessive Compulsive Disorder Son     Depression  Son     Eating Disorder Son         eats when depressed    Obesity Son     Thyroid Cancer Daughter         had thyroid removed    Bipolar Disorder Daughter     Other - See Comments Daughter         lives in [place] - single with one son 9  yrs    Other - See Comments Sister     Alzheimer Disease Father     Diabetes Paternal Aunt     Gastrointestinal Disease Maternal Grandmother         colitis     Parkinsonism Other         2 cousins with parkinson    C.A.D. No family hx of         PHYSICAL EXAM:  Vital Signs: /62   Pulse 77   Temp 97.4  F (36.3  C) (Oral)   SpO2 97%   GEN: Haresh Rock was examined in the standing and supine position.  The exam findings are significant for the patient's Parkinson's disease.  ABD: The abdomen is soft and nontender.  The area of the G-tube has abundant granulation tissue with approximately 2 x 2 x 1 cm of granulation tissue which is bleeding and covered with mucus.  This area was treated with silver nitrate, and this did not irritate the patient's skin.  The patient's G-tube was exchanged, excellent return of gastric contents and no difficulty in the G-tube exchange, and the patient tolerated that well.    EXT: warm and well perfused.     IMPRESSION:  Haresh Rock would like to pursue WILTON-KEY tube placement.  I do not think this would be appropriate with the amount of with granulation tissue currently present.  The granulation tissue will be treated with the silver nitrate sticks on an every third day basis.  The patient will return to the Surgery Clinic in approximately 30 days for additional treatment of the granulation tissue and possible change-out to a WILTON-KEY tube.      Answers for HPI/ROS submitted by the patient on 6/9/2022  General Symptoms: No  Skin Symptoms: No  HENT Symptoms: No  EYE SYMPTOMS: No  HEART SYMPTOMS: No  LUNG SYMPTOMS: Yes  INTESTINAL SYMPTOMS: Yes  URINARY SYMPTOMS: No  REPRODUCTIVE SYMPTOMS: No  SKELETAL SYMPTOMS: No  BLOOD SYMPTOMS:  No  NERVOUS SYSTEM SYMPTOMS: No  MENTAL HEALTH SYMPTOMS: No  Cough: No  Sputum or phlegm: No  Coughing up blood: No  Difficulty breating or shortness of breath: Yes  Snoring: No  Wheezing: No  Difficulty breathing on exertion: No  Nighttime Cough: No  Difficulty breathing when lying flat: No  Heart burn or indigestion: No  Nausea: No  Vomiting: No  Abdominal pain: Yes  Bloating: No  Constipation: No  Diarrhea: No  Blood in stool: No  Black stools: No  Rectal or Anal pain: No  Fecal incontinence: No  Yellowing of skin or eyes: No  Vomit with blood: No  Change in stools: No

## 2022-06-10 NOTE — PROGRESS NOTES
ANTICOAGULATION  MANAGEMENT-Home Monitor Managed by Exception    Hareshroger Rock 72 year old male is on warfarin with therapeutic INR result. (Goal INR 2.0-3.0)    Recent labs: (last 7 days)     06/10/22  0841   INR 2.1*         Previous INR was Therapeutic    Medication, diet, health changes since last INR:chart reviewed; none identified    Contacted within the last 12 weeks by phone on 4/5/22      MINNIE Hutson was NOT contacted regarding therapeutic result today per home monitoring policy manage by exception agreement.   Current warfarin dose is to be continued:     Summary  As of 6/10/2022    Full warfarin instructions:  5 mg every Fri; 3.75 mg all other days   Next INR check:  6/17/2022           ?   Allyn Doe RN  Anticoagulation Clinic  6/10/2022    _______________________________________________________________________     Anticoagulation Episode Summary     Current INR goal:  2.0-3.0   TTR:  75.4 % (11.7 mo)   Target end date:  Indefinite   Send INR reminders to:  CHELSEA CHILEL    Indications    Long-term (current) use of anticoagulants [Z79.01] [Z79.01]  Atrial fibrillation (H) [I48.91]  Antiphospholipid antibody syndrome (H) (Resolved) [D68.61]  Personal history of DVT (deep vein thrombosis) (Resolved) [Z86.718]  Atrial fibrillation  unspecified type (H) (Resolved) [I48.91]  Paroxysmal atrial fibrillation (H) [I48.0]           Comments:  JESSICA rodas to manage by exception         Anticoagulation Care Providers     Provider Role Specialty Phone number    Anya Nowak MD Referring Family Medicine [phone]

## 2022-06-10 NOTE — PATIENT INSTRUCTIONS
You met with Dr. Reg Cantu.      Today's visit instructions:    Please use the Silver Nitrate sticks as recommended by Dr. Cantu.    Please return to the clinic for a WILTON Tube exchange when your return to the [facility].     If you have questions please contact Annabelle RN or Keya RN during regular clinic hours, Monday through Friday 7:30 AM - 4:00 PM, or you can contact us via Truecaller at anytime.       If you have urgent needs after-hours, weekends, or holidays please call the hospital at [phone] and ask to speak with our on-call General Surgery Team.    Appointment [phone]  Nurse Advice (Annabelle or Keya): [phone]   Surgery Scheduler (Stas): [phone]  Fax: [phone]

## 2022-06-16 ENCOUNTER — PATIENT OUTREACH (OUTPATIENT)
Dept: CARE COORDINATION | Facility: CLINIC | Age: 72
End: 2022-06-16
Payer: COMMERCIAL

## 2022-06-17 ENCOUNTER — TRANSFERRED RECORDS (OUTPATIENT)
Dept: HEALTH INFORMATION MANAGEMENT | Facility: CLINIC | Age: 72
End: 2022-06-17

## 2022-06-17 ENCOUNTER — DOCUMENTATION ONLY (OUTPATIENT)
Dept: ANTICOAGULATION | Facility: CLINIC | Age: 72
End: 2022-06-17
Payer: COMMERCIAL

## 2022-06-17 DIAGNOSIS — I48.0 PAROXYSMAL ATRIAL FIBRILLATION (H): ICD-10-CM

## 2022-06-17 DIAGNOSIS — I48.91 ATRIAL FIBRILLATION (H): ICD-10-CM

## 2022-06-17 DIAGNOSIS — Z79.01 LONG TERM CURRENT USE OF ANTICOAGULANT THERAPY: Primary | ICD-10-CM

## 2022-06-17 LAB — INR (EXTERNAL): 2.7 (ref 0.9–1.1)

## 2022-06-17 NOTE — PROGRESS NOTES
Community Health Worker Follow Up    Care Gaps:     Health Maintenance Due   Topic Date Due     ZOSTER IMMUNIZATION (2 of 3) 11/14/2016     DIABETIC FOOT EXAM  04/23/2020     EYE EXAM  01/01/2022       Patient states that he had his eye exam at [facility] 1/22. Note in chart. Will check insurance to determine if he should get Zoster Vac at clinic or pharmacy.    Goals:    Goals Addressed as of 6/17/2022 at 8:21 AM                    6/16/22    3/15/22       #1  Other - Care Gaps (pt-stated)   80%  70%    Added 12/13/21 by Uriel Van, RN      Goal Statement: I will work with my PCP to close my care gaps by scheduling an eye exam, and scheduling a diabetic foot exam.  Date Goal set: 12/13/21  Barriers: The patient suffers from Parkinson.  Strengths: engaged in care coordination  Date to Achieve By: 6/13/23  Patient expressed understanding of goal: yes  Action steps to achieve this goal:  1. I will speak with my PCP about scheduling a diabetic foot exam.  2. I will make an appointment for an annual eye exam.  3. I will ask my PCP about which immunization are appropriate for me.    Discussed 6/16/22           #2  Functional (pt-stated)   80%  70%    Added 12/13/21 by Uriel Van, RN      Goal Statement: I will work on walking without the walker, and increasing my strength and stamina.  Date Goal set: 12/13/21  Barriers: suffers from parkinson's  Strengths: engaged in care coordination  Date to Achieve By: 6/13/23  Patient expressed understanding of goal: yes  Action steps to achieve this goal:  1. I will practice in the house walking without the walker. No longer using the walker as of 3/15/22.  2. I will go outside without the walker when I feel strong enough and have increased my strength and stamina.  3. I will frequently walk around inside the house to increase my strength and stamina.    6/16/22                Intervention and Education during outreach: Patient states he has not walked with his walker now  for about 6 months. He is trying to be as active as possible within his limitations. He states he no longer has the foot drop issue.     He was having issues with his G-tube and has seen GI to resolve that.    CHW Plan: CHW will continue to support patient with goals through routine scheduled outreach.     Next outreach due: 7/18/22

## 2022-06-17 NOTE — PROGRESS NOTES
ANTICOAGULATION  MANAGEMENT-Home Monitor Managed by Exception    Haresh EMILIE Rock 72 year old male is on warfarin with therapeutic INR result. (Goal INR 2.0-3.0)    Recent labs: (last 7 days)     06/17/22  1740   INR 2.7*         Previous INR was Therapeutic    Medication, diet, health changes since last INR:chart reviewed; none identified    Contacted within the last 12 weeks by phone on 04/05/2022      MINNIE Hutson was NOT contacted regarding therapeutic result today per home monitoring policy manage by exception agreement.   Current warfarin dose is to be continued:     Summary  As of 6/17/2022    Full warfarin instructions:  5 mg every Fri; 3.75 mg all other days   Next INR check:  6/24/2022           ?   Hari Saavedra RN  Anticoagulation Clinic  6/17/2022    _______________________________________________________________________     Anticoagulation Episode Summary     Current INR goal:  2.0-3.0   TTR:  76.3 % (11.7 mo)   Target end date:  Indefinite   Send INR reminders to:  CHELSEA CHILEL    Indications    Long-term (current) use of anticoagulants [Z79.01] [Z79.01]  Atrial fibrillation (H) [I48.91]  Antiphospholipid antibody syndrome (H) (Resolved) [D68.61]  Personal history of DVT (deep vein thrombosis) (Resolved) [Z86.718]  Atrial fibrillation  unspecified type (H) (Resolved) [I48.91]  Paroxysmal atrial fibrillation (H) [I48.0]           Comments:  JESSICA rodas to manage by exception         Anticoagulation Care Providers     Provider Role Specialty Phone number    Anya oNwak MD Referring Family Medicine [phone]

## 2022-06-20 ENCOUNTER — DOCUMENTATION ONLY (OUTPATIENT)
Dept: ANTICOAGULATION | Facility: CLINIC | Age: 72
End: 2022-06-20
Payer: COMMERCIAL

## 2022-06-20 DIAGNOSIS — I48.20 CHRONIC ATRIAL FIBRILLATION (H): Primary | ICD-10-CM

## 2022-06-20 DIAGNOSIS — Z86.711 PERSONAL HISTORY OF PE (PULMONARY EMBOLISM): ICD-10-CM

## 2022-06-20 NOTE — PROGRESS NOTES
ANTICOAGULATION CLINIC REFERRAL RENEWAL REQUEST       An annual renewal order is required for all patients referred to [facility].?  Please review and sign the pended referral order for Haresh Rock.       ANTICOAGULATION SUMMARY      Warfarin indication(s)   Atrial Fibrillation, DVT and Antiphospholipid Antibodies    Mechanical heart valve present?  NO       Current goal range   INR: 2.0-3.0     Goal appropriate for indication? Goal INR 2-3, standard for indication(s) above     Time in Therapeutic Range (TTR)  (Goal > 60%) 76.3%       Office visit with referring provider's group within last year yes on 6/8/22       Blanca Rodriguez RN  [facility]

## 2022-06-22 NOTE — PROGRESS NOTES
This is a recent snapshot of the patient's [facility] medical record.  For current drug dose and complete information and questions, call [phone] or In Basket pool, [facility] home infusion ([id number])  CSN Number:  [id number]

## 2022-06-24 ENCOUNTER — TRANSFERRED RECORDS (OUTPATIENT)
Dept: HEALTH INFORMATION MANAGEMENT | Facility: CLINIC | Age: 72
End: 2022-06-24

## 2022-06-24 ENCOUNTER — DOCUMENTATION ONLY (OUTPATIENT)
Dept: ANTICOAGULATION | Facility: CLINIC | Age: 72
End: 2022-06-24

## 2022-06-24 DIAGNOSIS — Z79.01 LONG TERM CURRENT USE OF ANTICOAGULANT THERAPY: Primary | ICD-10-CM

## 2022-06-24 DIAGNOSIS — I48.20 CHRONIC ATRIAL FIBRILLATION (H): ICD-10-CM

## 2022-06-24 DIAGNOSIS — Z86.711 PERSONAL HISTORY OF PE (PULMONARY EMBOLISM): ICD-10-CM

## 2022-06-24 DIAGNOSIS — I48.0 PAROXYSMAL ATRIAL FIBRILLATION (H): ICD-10-CM

## 2022-06-24 LAB — INR (EXTERNAL): 2.3 (ref 0.9–1.1)

## 2022-06-28 ENCOUNTER — PATIENT OUTREACH (OUTPATIENT)
Dept: CARE COORDINATION | Facility: CLINIC | Age: 72
End: 2022-06-28

## 2022-06-28 NOTE — PROGRESS NOTES
Clinic Care Coordination Contact  Care Team Conversations    Care Coordination Clinician Chart Review     Situation: Patient chart reviewed by care coordinator.?     Background: Initial assessment and enrollment to Care Coordination was 11/5/21.?? Patient centered goals were developed with participation from patient.? Lead CC handed patient off to CHW for continued outreach every 30 days.??     Assessment: Per chart review, patient outreach completed by CC CHW on 6/17/22.? Patient is actively working to accomplish goal(s).? Patient's goal(s) remain(s) appropriate at this time.? Patient is not due for updated Plan of Care.? Annual assessment will be due 11/5/22.      Goals        #1  Other - Care Gaps (pt-stated)       Goal Statement: I will work with my PCP to close my care gaps by scheduling an eye exam, and scheduling a diabetic foot exam.  Date Goal set: 12/13/21  Barriers: The patient suffers from Parkinson.  Strengths: engaged in care coordination  Date to Achieve By: 6/13/23  Patient expressed understanding of goal: yes  Action steps to achieve this goal:  1. I will speak with my PCP about scheduling a diabetic foot exam.  2. I will make an appointment for an annual eye exam.  3. I will ask my PCP about which immunization are appropriate for me.    Discussed 6/16/22           #2  Functional (pt-stated)       Goal Statement: I will work on walking without the walker, and increasing my strength and stamina.  Date Goal set: 12/13/21  Barriers: suffers from parkinson's  Strengths: engaged in care coordination  Date to Achieve By: 6/13/23  Patient expressed understanding of goal: yes  Action steps to achieve this goal:  1. I will practice in the house walking without the walker. No longer using the walker as of 3/15/22.  2. I will go outside without the walker when I feel strong enough and have increased my strength and stamina.  3. I will frequently walk around inside the house to increase my strength and  stamina.    6/16/22            ??     Plan/Recommendations: The patient will continue working with Care Coordination to achieve above goal(s).? CHW will involve Lead CC as needed or if patient is ready to move to maintenance.? Lead CC will continue to monitor CHW s monthly outreaches and progress to goal(s) every 6 weeks.?     Plan of Care updated and sent to patient: No.    CHW delegation keep the goal of walking without the walker so that the patient is still able to perform the skill.  With his disease he may move backward at some time.        Uriel Zhou MSN, RN, PHN, CCM   Primary Care Clinical RN Care Coordinator  [facility]  6/28/2022   12:03 PM  [email or web address]  Office: [phone]

## 2022-07-01 ENCOUNTER — TRANSFERRED RECORDS (OUTPATIENT)
Dept: HEALTH INFORMATION MANAGEMENT | Facility: CLINIC | Age: 72
End: 2022-07-01

## 2022-07-05 ENCOUNTER — DOCUMENTATION ONLY (OUTPATIENT)
Dept: ANTICOAGULATION | Facility: CLINIC | Age: 72
End: 2022-07-05

## 2022-07-05 DIAGNOSIS — I48.91 ATRIAL FIBRILLATION (H): ICD-10-CM

## 2022-07-05 DIAGNOSIS — I48.20 CHRONIC ATRIAL FIBRILLATION (H): ICD-10-CM

## 2022-07-05 DIAGNOSIS — Z86.711 PERSONAL HISTORY OF PE (PULMONARY EMBOLISM): ICD-10-CM

## 2022-07-05 DIAGNOSIS — Z79.01 LONG TERM CURRENT USE OF ANTICOAGULANT THERAPY: Primary | ICD-10-CM

## 2022-07-05 DIAGNOSIS — I48.0 PAROXYSMAL ATRIAL FIBRILLATION (H): ICD-10-CM

## 2022-07-05 LAB — INR (EXTERNAL): 2.8 (ref 0.9–1.1)

## 2022-07-05 NOTE — PROGRESS NOTES
ANTICOAGULATION  MANAGEMENT-Home Monitor Managed by Exception    Haresh EMILIE Rock 72 year old male is on warfarin with therapeutic INR result. (Goal INR 2.0-3.0)    Recent labs: (last 7 days)     07/01/22  0804   INR 2.8*         Previous INR was Therapeutic    Medication, diet, health changes since last INR:chart reviewed; none identified    Contacted within the last 12 weeks by phone on 4/5/22      MINNIE Hutson was NOT contacted regarding therapeutic result today per home monitoring policy manage by exception agreement.   Current warfarin dose is to be continued:     Summary  As of 7/5/2022    Full warfarin instructions:  5 mg every Fri; 3.75 mg all other days   Next INR check:  7/8/2022           ?   Shanice Hyde RN  Anticoagulation Clinic  7/5/2022    _______________________________________________________________________     Anticoagulation Episode Summary     Current INR goal:  2.0-3.0   TTR:  79.6 % (11.6 mo)   Target end date:  Indefinite   Send INR reminders to:  CHELSEA CHILEL    Indications    Long-term (current) use of anticoagulants [Z79.01] [Z79.01]  Atrial fibrillation (H) [I48.91]  Antiphospholipid antibody syndrome (H) (Resolved) [D68.61]  Personal history of DVT (deep vein thrombosis) (Resolved) [Z86.718]  Atrial fibrillation  unspecified type (H) (Resolved) [I48.91]  Paroxysmal atrial fibrillation (H) [I48.0]  Chronic atrial fibrillation (H) [I48.20]  Personal history of PE (pulmonary embolism) [Z86.711]           Comments:  JESSICA rodas to manage by exception         Anticoagulation Care Providers     Provider Role Specialty Phone number    Anya Nowak MD Referring Family Medicine [phone]

## 2022-07-08 ENCOUNTER — DOCUMENTATION ONLY (OUTPATIENT)
Dept: ANTICOAGULATION | Facility: CLINIC | Age: 72
End: 2022-07-08

## 2022-07-08 ENCOUNTER — TRANSFERRED RECORDS (OUTPATIENT)
Dept: HEALTH INFORMATION MANAGEMENT | Facility: CLINIC | Age: 72
End: 2022-07-08

## 2022-07-08 DIAGNOSIS — Z79.01 LONG TERM CURRENT USE OF ANTICOAGULANT THERAPY: Primary | ICD-10-CM

## 2022-07-08 DIAGNOSIS — I48.0 PAROXYSMAL ATRIAL FIBRILLATION (H): ICD-10-CM

## 2022-07-08 DIAGNOSIS — Z86.711 PERSONAL HISTORY OF PE (PULMONARY EMBOLISM): ICD-10-CM

## 2022-07-08 DIAGNOSIS — I48.20 CHRONIC ATRIAL FIBRILLATION (H): ICD-10-CM

## 2022-07-08 LAB — INR (EXTERNAL): 2.6 (ref 0.9–1.1)

## 2022-07-08 NOTE — PROGRESS NOTES
ANTICOAGULATION  MANAGEMENT-Home Monitor Managed by Exception    Haresh EMILIE Rock 72 year old male is on warfarin with therapeutic INR result. (Goal INR 2.0-3.0)    Recent labs: (last 7 days)     07/08/22  0857   INR 2.6*         Previous INR was Therapeutic    Medication, diet, health changes since last INR:chart reviewed; none identified    Contacted within the last 12 weeks by phone on 4/5      MINNIE     Haresh was NOT contacted regarding therapeutic result today per home monitoring policy manage by exception agreement.   Current warfarin dose is to be continued:     Summary  As of 7/8/2022    Full warfarin instructions:  5 mg every Fri; 3.75 mg all other days   Next INR check:  7/15/2022           ?   Allyn Doe, RN  Anticoagulation Clinic  7/8/2022    _______________________________________________________________________     Anticoagulation Episode Summary     Current INR goal:  2.0-3.0   TTR:  79.8 % (11.7 mo)   Target end date:  Indefinite   Send INR reminders to:  CHELSEA CHILEL    Indications    Long-term (current) use of anticoagulants [Z79.01] [Z79.01]  Atrial fibrillation (H) [I48.91]  Antiphospholipid antibody syndrome (H) (Resolved) [D68.61]  Personal history of DVT (deep vein thrombosis) (Resolved) [Z86.718]  Atrial fibrillation  unspecified type (H) (Resolved) [I48.91]  Paroxysmal atrial fibrillation (H) [I48.0]  Chronic atrial fibrillation (H) [I48.20]  Personal history of PE (pulmonary embolism) [Z86.711]           Comments:  JESSICA rodas to manage by exception         Anticoagulation Care Providers     Provider Role Specialty Phone number    Anya Nowak MD Referring Family Medicine [phone]

## 2022-07-15 ENCOUNTER — TRANSFERRED RECORDS (OUTPATIENT)
Dept: HEALTH INFORMATION MANAGEMENT | Facility: CLINIC | Age: 72
End: 2022-07-15

## 2022-07-15 ENCOUNTER — DOCUMENTATION ONLY (OUTPATIENT)
Dept: ANTICOAGULATION | Facility: CLINIC | Age: 72
End: 2022-07-15

## 2022-07-15 DIAGNOSIS — I48.20 CHRONIC ATRIAL FIBRILLATION (H): ICD-10-CM

## 2022-07-15 DIAGNOSIS — Z79.01 LONG TERM CURRENT USE OF ANTICOAGULANT THERAPY: Primary | ICD-10-CM

## 2022-07-15 DIAGNOSIS — I48.91 ATRIAL FIBRILLATION (H): ICD-10-CM

## 2022-07-15 DIAGNOSIS — I48.0 PAROXYSMAL ATRIAL FIBRILLATION (H): ICD-10-CM

## 2022-07-15 DIAGNOSIS — Z86.711 PERSONAL HISTORY OF PE (PULMONARY EMBOLISM): ICD-10-CM

## 2022-07-15 LAB — INR (EXTERNAL): 2.5 (ref 0.9–1.1)

## 2022-07-15 NOTE — PROGRESS NOTES
ANTICOAGULATION MANAGEMENT     Haresh Rock 72 year old male is on warfarin with therapeutic INR result. (Goal INR 2.0-3.0)    Recent labs: (last 7 days)     07/15/22  1211   INR 2.5*       ASSESSMENT       Source(s): Chart Review    Previous INR was Therapeutic last 2(+) visits    Medication, diet, health changes since last INR chart reviewed; none identified           PLAN     Recommended plan for no diet, medication or health factor changes affecting INR     Dosing Instructions: continue your current warfarin dose with next INR in 1 week       Summary  As of 7/15/2022    Full warfarin instructions:  5 mg every Fri; 3.75 mg all other days   Next INR check:  7/22/2022             Detailed voice message left for Haresh with dosing instructions and follow up date.   Sent [employer] message with dosing and follow up instructions    Patient to recheck with home meter    Education provided: Please call back if any changes to your diet, medications or how you've been taking warfarin    Plan made per [facility] anticoagulation protocol    Mayi Ayala RN  Anticoagulation Clinic  7/15/2022    _______________________________________________________________________     Anticoagulation Episode Summary     Current INR goal:  2.0-3.0   TTR:  80.0 % (11.7 mo)   Target end date:  Indefinite   Send INR reminders to:  CHELSEA CHILEL    Indications    Long-term (current) use of anticoagulants [Z79.01] [Z79.01]  Atrial fibrillation (H) [I48.91]  Antiphospholipid antibody syndrome (H) (Resolved) [D68.61]  Personal history of DVT (deep vein thrombosis) (Resolved) [Z86.718]  Atrial fibrillation  unspecified type (H) (Resolved) [I48.91]  Paroxysmal atrial fibrillation (H) [I48.0]  Chronic atrial fibrillation (H) [I48.20]  Personal history of PE (pulmonary embolism) [Z86.711]           Comments:  JESSICA rodas to manage by exception         Anticoagulation Care Providers     Provider Role Specialty Phone number    Anya Nowak MD  [facility] Family Medicine [phone]

## 2022-07-15 NOTE — PROGRESS NOTES
Incoming fax    Receieved on 07/15/22    From [facility]   INR 2.5     Needs 3 month follow up call   Last contacted 4/5      Macrina Avila, RN, BSN, PHN

## 2022-07-18 ASSESSMENT — ENCOUNTER SYMPTOMS
COUGH DISTURBING SLEEP: 0
SHORTNESS OF BREATH: 1
COUGH: 0
SNORES LOUDLY: 0
POSTURAL DYSPNEA: 0
DYSPNEA ON EXERTION: 0
HEMOPTYSIS: 0
WHEEZING: 0
SPUTUM PRODUCTION: 1

## 2022-07-22 ENCOUNTER — TRANSFERRED RECORDS (OUTPATIENT)
Dept: HEALTH INFORMATION MANAGEMENT | Facility: CLINIC | Age: 72
End: 2022-07-22

## 2022-07-22 ENCOUNTER — CARE COORDINATION (OUTPATIENT)
Dept: SURGERY | Facility: CLINIC | Age: 72
End: 2022-07-22

## 2022-07-22 ENCOUNTER — DOCUMENTATION ONLY (OUTPATIENT)
Dept: ANTICOAGULATION | Facility: CLINIC | Age: 72
End: 2022-07-22

## 2022-07-22 DIAGNOSIS — I48.20 CHRONIC ATRIAL FIBRILLATION (H): ICD-10-CM

## 2022-07-22 DIAGNOSIS — Z86.711 PERSONAL HISTORY OF PE (PULMONARY EMBOLISM): ICD-10-CM

## 2022-07-22 DIAGNOSIS — I48.0 PAROXYSMAL ATRIAL FIBRILLATION (H): ICD-10-CM

## 2022-07-22 DIAGNOSIS — Z79.01 LONG TERM CURRENT USE OF ANTICOAGULANT THERAPY: Primary | ICD-10-CM

## 2022-07-22 LAB — INR (EXTERNAL): 2.3 (ref 0.9–1.1)

## 2022-07-22 NOTE — PROGRESS NOTES
ANTICOAGULATION  MANAGEMENT-Home Monitor Managed by Exception    Haresh EMILIE Rock 72 year old male is on warfarin with therapeutic INR result. (Goal INR 2.0-3.0)    Recent labs: (last 7 days)     07/22/22  0901   INR 2.3*         Previous INR was Therapeutic    Medication, diet, health changes since last INR:chart reviewed; none identified    Contacted within the last 12 weeks by phone on 7/15/22      MINNIE Hutson was NOT contacted regarding therapeutic result today per home monitoring policy manage by exception agreement.   Current warfarin dose is to be continued:     Summary  As of 7/22/2022    Full warfarin instructions:  5 mg every Fri; 3.75 mg all other days   Next INR check:  7/29/2022           ?   Allyn Doe RN  Anticoagulation Clinic  7/22/2022    _______________________________________________________________________     Anticoagulation Episode Summary     Current INR goal:  2.0-3.0   TTR:  81.3 % (11.7 mo)   Target end date:  Indefinite   Send INR reminders to:  CHELSEA CHILEL    Indications    Long-term (current) use of anticoagulants [Z79.01] [Z79.01]  Atrial fibrillation (H) [I48.91]  Antiphospholipid antibody syndrome (H) (Resolved) [D68.61]  Personal history of DVT (deep vein thrombosis) (Resolved) [Z86.718]  Atrial fibrillation  unspecified type (H) (Resolved) [I48.91]  Paroxysmal atrial fibrillation (H) [I48.0]  Chronic atrial fibrillation (H) [I48.20]  Personal history of PE (pulmonary embolism) [Z86.711]           Comments:  JESSICA rodas to manage by exception         Anticoagulation Care Providers     Provider Role Specialty Phone number    Anya Nowak MD Referring Family Medicine [phone]

## 2022-07-25 ENCOUNTER — OFFICE VISIT (OUTPATIENT)
Dept: SURGERY | Facility: CLINIC | Age: 72
End: 2022-07-25
Payer: COMMERCIAL

## 2022-07-25 VITALS
SYSTOLIC BLOOD PRESSURE: 112 MMHG | HEIGHT: 71 IN | WEIGHT: 160.9 LBS | OXYGEN SATURATION: 98 % | BODY MASS INDEX: 22.52 KG/M2 | HEART RATE: 67 BPM | DIASTOLIC BLOOD PRESSURE: 57 MMHG

## 2022-07-25 DIAGNOSIS — Z43.1 ATTENTION TO G-TUBE (H): Primary | ICD-10-CM

## 2022-07-25 PROCEDURE — 43762 RPLC GTUBE NO REVJ TRC: CPT | Performed by: SURGERY

## 2022-07-25 ASSESSMENT — PAIN SCALES - GENERAL: PAINLEVEL: NO PAIN (0)

## 2022-07-25 NOTE — LETTER
7/25/2022       RE: Haresh Rock  [address] [place] [address]     Dear Colleague,    Thank you for referring your patient, Haresh Rock, to the [facility]. Please see a copy of my visit note below.    General Surgery Clinic:      The patient, Mr. Haresh Rock, well known to the Surgery Clinic, presents for change out to a Jerrell-Key tube for his G-tube.  The patient has had multiple aspiration demonstrations using the swallowing study.  The patient is currently receiving his nutrition through a G-tube.  The G-tube had to be replaced by Surgery as there was an intervening loop of small intestine during the first placement of the G-tube that was done by Interventional Radiology.  At this point, the patient is doing very well with home feedings.  He is using a regular G-tube.  He would like to have available the Jerrell-Key, flat, low profile G-tube.  So today we are replacing his 18-Brazilian standard G-tube with a 20-Brazilian 2 cm Jerrell-Key tube.     ROS: a ten point ROS is negative.     PAST MEDICAL HISTORY:  Past Medical History:   Diagnosis Date     Abnormal dopamine scan (DaTSCAN) 2020 11/04/2020    [phone] 11/3/2020   Narrative & Impression   Examination: Nuclear medicine DATscan for Dopamine Receptor Localization.   Examination: NM Brain Image Tomographic (SPECT) DATscan   Date: 11/3/2020 3:21 PM   Indication: Parkinsonism   Comparison: None   Additional Information: none   Interfering Medications: None   Technique:   The patient initially received 1 ml of Lugol's solution orally prior     Antiphospholipid antibody syndrome (H)     Ravi's, noted negative per testing re-done in 2008 in hematology note 01/13/2009     Articulation disorder 09/23/2020     Atrial fibrillation (H) 04/2011     Benign prostatic hyperplasia with urinary retention      Cirrhosis (H)      CKD (chronic kidney disease) stage 2, GFR 60-89  ml/min      Diabetes mellitus type II     steroid induced     DJD (degenerative joint disease) of knee     SHAWN, worse on right     DVT (deep venous thrombosis) (H)      ED (erectile dysfunction)      Gallbladder polyp 05/2010     Gllra-Dbkwyf-Hxot Disease 2007    BMT     Hemochromatosis      Hodgkin's disease NOS     5 cycles of ABVD in 2011     HTN (hypertension)      Hyperlipidaemia LDL goal <100      Multiple thyroid nodules     benign      Myeloproliferative disorder (H)     atypical, [facility]     Parkinson disease (H) 09/24/2020     PE (pulmonary embolism) 11/2004     Polyneuropathy      Pressure ulcer      Primary pulmonary hypertension (H)      Severe protein-calorie malnutrition (H) 11/10/2021     Small bowel obstruction (H) 10/29/2021     Thrombocytopenia (H) 06/08/2021        PAST SURGICAL HISTORY:  Past Surgical History:   Procedure Laterality Date     ANESTHESIA CARDIOVERSION  04/21/2011    Procedure:ANESTHESIA CARDIOVERSION; Surgeon:GENERIC ANESTHESIA PROVIDER; [facility] OR     ARTHROSCOPY KNEE RT/LT      LT     CATARACT IOL, RT/LT  2017     COLONOSCOPY  10/16/2012    Procedure: COLONOSCOPY;  Colonoscopy, screening;  Surgeon: Reg Feliciano MD;  Location: [facility] OR     COLONOSCOPY N/A 04/14/2021    Procedure: COLONOSCOPY;  Surgeon: Reg Holm MD;  Location: [facility] GI     ESOPHAGOSCOPY, GASTROSCOPY, DUODENOSCOPY (EGD), COMBINED N/A 10/07/2020    Procedure: ESOPHAGOGASTRODUODENOSCOPY, WITH BIOPSY;  Surgeon: Raul Sawyer DO;  Location: [facility] OR     H ABLATION FOCAL AFIB  03/05/2013    PVI     H ABLATION FOCAL AFIB  01/01/2018     HC BONE MARROW/STEM TRANSPLANT ALLOGENIC  05/08/2007     INSERT PORT VASCULAR ACCESS       IR GASTROSTOMY TUBE PERCUTANEOUS PLCMNT  10/25/2021     JOINT REPLACEMTN, KNEE RT/LT  03/28/2012    SHAWN     LAPAROSCOPY DIAGNOSTIC (GENERAL) N/A 10/29/2021    Procedure: LAPAROSCOPY, DIAGNOSTIC, TAKEDOWN OF MALPOSITIONED GASTROSTOMY TUBE, INSERTION OF GASTROSTOMY TUBE, SMALL BOWEL  RESECTION X1, PRIMARY REPAIR OF SMALL BOWEL ENTEROTOMY, ABDOMINAL WASHOUT, TAP BLOCK;  Surgeon: Leif Finney DO;  Location: [facility] OR     PEG TUBE PLACEMENT  10/25/2021     VASECTOMY  1990        MEDICATIONS:  Current Outpatient Medications   Medication     amoxicillin (AMOXIL) 500 MG capsule     carbidopa-levodopa (SINEMET)  MG tablet     flecainide (TAMBOCOR) 50 MG tablet     fluticasone (FLONASE) 50 MCG/ACT nasal spray     loratadine (CLARITIN REDITABS) 10 MG ODT     Metoprolol Succinate (KAPSPARGO) 25 MG CS24     Multiple Vitamins-Minerals (MULTIVITAMIN GUMMIES ADULT PO)     NONFORMULARY     omeprazole (PRILOSEC) 20 MG DR capsule     PREVIDENT 5000 DRY MOUTH 1.1 % GEL topical gel     rosuvastatin (CRESTOR) 40 MG tablet     vitamin D3 (CHOLECALCIFEROL) 50 mcg (2000 units) tablet     warfarin ANTICOAGULANT (COUMADIN) 2.5 MG tablet     Current Facility-Administered Medications   Medication     ondansetron (ZOFRAN) injection 4 mg        ALLERGIES:  Allergies   Allergen Reactions     Seasonal Allergies      SOCIAL HISTORY:  Social History     Socioeconomic History     Marital status:      Spouse name: Angelica     Number of children: 2     Years of education: 21     Highest education level: None   Occupational History     Occupation:      Employer: [employer]     Employer: [employer]   Tobacco Use     Smoking status: Never Smoker     Smokeless tobacco: Never Used   Substance and Sexual Activity     Alcohol use: No     Drug use: No     Sexual activity: Not Currently     Partners: Female   Other Topics Concern     Parent/sibling w/ CABG, MI or angioplasty before 65F 55M? No   Social History Narrative     about 48 yrs        Wife has some health issues - back pain - second knee replaced    She had gastric bypass and a fib and bad mitral valve        Son in [place]    Daughter in [facility] with 10 yo son.         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 no kids and not .     Obsessive Compulsive Disorder Son      Depression Son      Eating Disorder Son         eats when depressed     Obesity Son      Thyroid Cancer Daughter         had thyroid removed     Bipolar Disorder Daughter      Other - See Comments Daughter         lives in [place] - single with one son 9  yrs     Other - See Comments Sister      Alzheimer Disease Father      Diabetes Paternal Aunt      Gastrointestinal Disease Maternal Grandmother         colitis      Parkinsonism Other         2 cousins with parkinson     C.A.D. No family hx of      PHYSICAL EXAM:  Vital Signs: /57 (BP Location: Left arm, Patient Position: Sitting, Cuff Size: Adult Regular)   Pulse 67   Ht 1.803 m (5' 11\")   Wt 73 kg (160 lb 14.4 oz)   SpO2 98%   BMI 22.44 kg/m     GEN: the patient appears in N.A. D. In the surgery clinic.  ABD: soft and non-tender, no masses, no hernias, the G-tube site has a small amount of granulation tissue.   EXT: warm and well perfused.      PROCEDURE:  Haresh Rock was comfortable in supine position.  The previous 18-Indian G-tube was removed after reducing the balloon - this was without difficulty or issue - and the #20-Indian Jerrell-Key tube was advanced into the same location without injury or difficulty.  Gastric contents returned and the balloon was filled to 5 cm.  The patient tolerated the change out to the Jerrell-Key tube with a 2 cm length and a 20 cm Indian diameter.  The placement was without difficulty.  A small amount of granulation tissue was treated with silver nitrate sticks and the patient tolerated " this as well.  The patient's abdomen is soft and nontender.  The patient is in no apparent distress.     IMPRESSION:  Mr. Haresh Rock, a 72-year-old patient with Parkinson's disease, who had failed multiple swallowing studies, now has a Jerrell-Key tube.  The patient will care for the tube in the standard way.  At this point, these tubes should probably be replaced every 6 months.  The patient will call or return to arrange for a replacement.  He will also call or return should the Jerrell-Key tube not work and would like to have it replaced with a standard G-tube.         Answers for HPI/ROS submitted by the patient on 7/18/2022  General Symptoms: No  Skin Symptoms: No  HENT Symptoms: No  EYE SYMPTOMS: No  HEART SYMPTOMS: No  LUNG SYMPTOMS: Yes  INTESTINAL SYMPTOMS: No  URINARY SYMPTOMS: No  REPRODUCTIVE SYMPTOMS: No  SKELETAL SYMPTOMS: No  BLOOD SYMPTOMS: No  NERVOUS SYSTEM SYMPTOMS: No  MENTAL HEALTH SYMPTOMS: No  Cough: No  Sputum or phlegm: Yes  Coughing up blood: No  Difficulty breating or shortness of breath: Yes  Snoring: No  Wheezing: No  Difficulty breathing on exertion: No  Nighttime Cough: No  Difficulty breathing when lying flat: No          Sincerely,    Reg Cantu MD

## 2022-07-25 NOTE — NURSING NOTE
"Chief Complaint   Patient presents with     RECHECK     Exchange for WILTON tube       Vitals:    07/25/22 1336   BP: 112/57   BP Location: Left arm   Patient Position: Sitting   Cuff Size: Adult Regular   Pulse: 67   SpO2: 98%   Weight: 73 kg (160 lb 14.4 oz)   Height: 1.803 m (5' 11\")       Body mass index is 22.44 kg/m .                          Devendra Zepeda, EMT    "

## 2022-07-25 NOTE — PATIENT INSTRUCTIONS
You met with Dr. Reg Cantu.      Today's visit instructions:    Dr. Cantu exchanged your feeding tube to a Jerrell-Key tube (20Fr, 2cm). You should have this exchanged in approximately 6 months. Please call us at the Nurse Advice line listed below when you are needing another tube exchange.        If you have questions please contact Annabelle RN or Keya RN during regular clinic hours, Monday through Friday 7:30 AM - 4:00 PM, or you can contact us via Spring at anytime.       If you have urgent needs after-hours, weekends, or holidays please call the hospital at [phone] and ask to speak with our on-call General Surgery Team.    Appointment [phone]  Nurse Advice (Annabelle or Keya): [phone]   Surgery Scheduler (Stas): [phone]  Fax: [phone]

## 2022-07-25 NOTE — PROCEDURES
General Surgery Clinic:     The patient, Mr. Haresh Rock, well known to the Surgery Clinic, presents for change out to a Jerrell-Key tube for his G-tube.  The patient has had multiple aspiration demonstrations using the swallowing study.  The patient is currently receiving his nutrition through a G-tube.  The G-tube had to be replaced by Surgery as there was an intervening loop of small intestine during the first placement of the G-tube that was done by Interventional Radiology.  At this point, the patient is doing very well with home feedings.  He is using a regular G-tube.  He would like to have available the Jerrell-Key, flat, low profile G-tube.  So today we are replacing his 18-Greek standard G-tube with a 20-Greek 2 cm Jerrell-Key tube.    ROS: a ten point ROS is negative.     PAST MEDICAL HISTORY:  Past Medical History:   Diagnosis Date     Abnormal dopamine scan (DaTSCAN) 2020 11/04/2020    [phone] 11/3/2020   Narrative & Impression   Examination: Nuclear medicine DATscan for Dopamine Receptor Localization.   Examination: NM Brain Image Tomographic (SPECT) DATscan   Date: 11/3/2020 3:21 PM   Indication: Parkinsonism   Comparison: None   Additional Information: none   Interfering Medications: None   Technique:   The patient initially received 1 ml of Lugol's solution orally prior     Antiphospholipid antibody syndrome (H)     Ravi's, noted negative per testing re-done in 2008 in hematology note 01/13/2009     Articulation disorder 09/23/2020     Atrial fibrillation (H) 04/2011     Benign prostatic hyperplasia with urinary retention      Cirrhosis (H)      CKD (chronic kidney disease) stage 2, GFR 60-89 ml/min      Diabetes mellitus type II     steroid induced     DJD (degenerative joint disease) of knee     SHAWN, worse on right     DVT (deep venous thrombosis) (H)      ED (erectile dysfunction)      Gallbladder polyp 05/2010     Oekou-Rzukbh-Bbro Disease 2007    BMT     Hemochromatosis      Hodgkin's disease NOS      5 cycles of ABVD in 2011     HTN (hypertension)      Hyperlipidaemia LDL goal <100      Multiple thyroid nodules     benign      Myeloproliferative disorder (H)     atypical, [facility]     Parkinson disease (H) 09/24/2020     PE (pulmonary embolism) 11/2004     Polyneuropathy      Pressure ulcer      Primary pulmonary hypertension (H)      Severe protein-calorie malnutrition (H) 11/10/2021     Small bowel obstruction (H) 10/29/2021     Thrombocytopenia (H) 06/08/2021        PAST SURGICAL HISTORY:  Past Surgical History:   Procedure Laterality Date     ANESTHESIA CARDIOVERSION  04/21/2011    Procedure:ANESTHESIA CARDIOVERSION; Surgeon:GENERIC ANESTHESIA PROVIDER; Location:[facility] OR     ARTHROSCOPY KNEE RT/LT      LT     CATARACT IOL, RT/LT  2017     COLONOSCOPY  10/16/2012    Procedure: COLONOSCOPY;  Colonoscopy, screening;  Surgeon: Reg Feliciano MD;  Location: [facility] OR     COLONOSCOPY N/A 04/14/2021    Procedure: COLONOSCOPY;  Surgeon: Reg Holm MD;  Location: [facility] GI     ESOPHAGOSCOPY, GASTROSCOPY, DUODENOSCOPY (EGD), COMBINED N/A 10/07/2020    Procedure: ESOPHAGOGASTRODUODENOSCOPY, WITH BIOPSY;  Surgeon: Raul Sawyer DO;  Location: [facility] OR     H ABLATION FOCAL AFIB  03/05/2013    PVI     H ABLATION FOCAL AFIB  01/01/2018     HC BONE MARROW/STEM TRANSPLANT ALLOGENIC  05/08/2007     INSERT PORT VASCULAR ACCESS       IR GASTROSTOMY TUBE PERCUTANEOUS PLCMNT  10/25/2021     JOINT REPLACEMTN, KNEE RT/LT  03/28/2012    SHAWN     LAPAROSCOPY DIAGNOSTIC (GENERAL) N/A 10/29/2021    Procedure: LAPAROSCOPY, DIAGNOSTIC, TAKEDOWN OF MALPOSITIONED GASTROSTOMY TUBE, INSERTION OF GASTROSTOMY TUBE, SMALL BOWEL RESECTION X1, PRIMARY REPAIR OF SMALL BOWEL ENTEROTOMY, ABDOMINAL WASHOUT, TAP BLOCK;  Surgeon: Leif Finney DO;  Location: [facility] OR     PEG TUBE PLACEMENT  10/25/2021     VASECTOMY  1990        MEDICATIONS:  Current Outpatient Medications   Medication     amoxicillin (AMOXIL) 500 MG capsule      carbidopa-levodopa (SINEMET)  MG tablet     flecainide (TAMBOCOR) 50 MG tablet     fluticasone (FLONASE) 50 MCG/ACT nasal spray     loratadine (CLARITIN REDITABS) 10 MG ODT     Metoprolol Succinate (KAPSPARGO) 25 MG CS24     Multiple Vitamins-Minerals (MULTIVITAMIN GUMMIES ADULT PO)     NONFORMULARY     omeprazole (PRILOSEC) 20 MG DR capsule     PREVIDENT 5000 DRY MOUTH 1.1 % GEL topical gel     rosuvastatin (CRESTOR) 40 MG tablet     vitamin D3 (CHOLECALCIFEROL) 50 mcg (2000 units) tablet     warfarin ANTICOAGULANT (COUMADIN) 2.5 MG tablet     Current Facility-Administered Medications   Medication     ondansetron (ZOFRAN) injection 4 mg        ALLERGIES:  Allergies   Allergen Reactions     Seasonal Allergies         SOCIAL HISTORY:  Social History     Socioeconomic History     Marital status:      Spouse name: Angelica     Number of children: 2     Years of education: 21     Highest education level: None   Occupational History     Occupation:      Employer: [employer]     Employer: [employer]   Tobacco Use     Smoking status: Never Smoker     Smokeless tobacco: Never Used   Substance and Sexual Activity     Alcohol use: No     Drug use: No     Sexual activity: Not Currently     Partners: Female   Other Topics Concern     Parent/sibling w/ CABG, MI or angioplasty before 65F 55M? No   Social History Narrative     about 48 yrs        Wife has some health issues - back pain - second knee replaced    She had gastric bypass and a fib and bad mitral valve        Son in [place]    Daughter in [facility] with 10 yo son.         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 no kids and not .     Obsessive Compulsive Disorder Son      Depression Son      Eating Disorder Son         eats when depressed     Obesity Son      Thyroid Cancer Daughter         had thyroid removed     Bipolar Disorder Daughter      Other - See Comments Daughter         lives in [place] - single with one son 9  yrs     Other - See Comments Sister      Alzheimer Disease Father      Diabetes Paternal Aunt      Gastrointestinal Disease Maternal Grandmother         colitis      Parkinsonism Other         2 cousins with parkinson     C.A.D. No family hx of        PHYSICAL EXAM:  Vital Signs: /57 (BP Location: Left arm, Patient Position: Sitting, Cuff Size: Adult Regular)   Pulse 67   Ht 1.803 m (5' 11\")   Wt 73 kg (160 lb 14.4 oz)   SpO2 98%   BMI 22.44 kg/m    GEN: the patient appears in N.A. D. In the surgery clinic.  ABD: soft and non-tender, no masses, no hernias, the G-tube site has a small amount of granulation tissue.   EXT: warm and well perfused.     PROCEDURE:  Haresh Rock was comfortable in supine position.  The previous 18-Citizen of [place]iss[place] G-tube was removed after reducing the balloon - this was without difficulty or issue - and the #20-Citizen of [place] Jerrell-Key tube was advanced into the same location without injury or difficulty.  Gastric contents returned and the balloon was filled to 5 cm.  The patient tolerated the change out to the Jerrell-Key tube with a 2 cm length and a 20 cm Citizen of [place] diameter.  The placement was without difficulty.  A small amount of granulation tissue was treated with silver nitrate sticks and the patient tolerated this as well.  The patient's abdomen is soft and nontender.  The patient is in no apparent distress.    IMPRESSION:  Mr. Haresh Rock, a 72-year-old patient with Parkinson's disease, who had failed multiple swallowing studies, now has a Jerrell-Key tube.  The patient will care for the tube in the standard way.  " At this point, these tubes should probably be replaced every 6 months.  The patient will call or return to arrange for a replacement.  He will also call or return should the Jerrell-Key tube not work and would like to have it replaced with a standard G-tube.

## 2022-07-26 ENCOUNTER — PATIENT OUTREACH (OUTPATIENT)
Dept: CARE COORDINATION | Facility: CLINIC | Age: 72
End: 2022-07-26

## 2022-07-26 NOTE — PROGRESS NOTES
General Surgery Clinic:      The patient, Mr. Haresh Rock, well known to the Surgery Clinic, presents for change out to a Jerrell-Key tube for his G-tube.  The patient has had multiple aspiration demonstrations using the swallowing study.  The patient is currently receiving his nutrition through a G-tube.  The G-tube had to be replaced by Surgery as there was an intervening loop of small intestine during the first placement of the G-tube that was done by Interventional Radiology.  At this point, the patient is doing very well with home feedings.  He is using a regular G-tube.  He would like to have available the Jerrell-Key, flat, low profile G-tube.  So today we are replacing his 18-Pakistani standard G-tube with a 20-Pakistani 2 cm Jerrell-Key tube.     ROS: a ten point ROS is negative.     PAST MEDICAL HISTORY:  Past Medical History:   Diagnosis Date     Abnormal dopamine scan (DaTSCAN) 2020 11/04/2020    [phone] 11/3/2020   Narrative & Impression   Examination: Nuclear medicine DATscan for Dopamine Receptor Localization.   Examination: NM Brain Image Tomographic (SPECT) DATscan   Date: 11/3/2020 3:21 PM   Indication: Parkinsonism   Comparison: None   Additional Information: none   Interfering Medications: None   Technique:   The patient initially received 1 ml of Lugol's solution orally prior     Antiphospholipid antibody syndrome (H)     Ravi's, noted negative per testing re-done in 2008 in hematology note 01/13/2009     Articulation disorder 09/23/2020     Atrial fibrillation (H) 04/2011     Benign prostatic hyperplasia with urinary retention      Cirrhosis (H)      CKD (chronic kidney disease) stage 2, GFR 60-89 ml/min      Diabetes mellitus type II     steroid induced     DJD (degenerative joint disease) of knee     SHAWN, worse on right     DVT (deep venous thrombosis) (H)      ED (erectile dysfunction)      Gallbladder polyp 05/2010     Thbrp-Oglskm-Qczt Disease 2007    BMT     Hemochromatosis      Hodgkin's disease NOS      5 cycles of ABVD in 2011     HTN (hypertension)      Hyperlipidaemia LDL goal <100      Multiple thyroid nodules     benign      Myeloproliferative disorder (H)     atypical, [facility]     Parkinson disease (H) 09/24/2020     PE (pulmonary embolism) 11/2004     Polyneuropathy      Pressure ulcer      Primary pulmonary hypertension (H)      Severe protein-calorie malnutrition (H) 11/10/2021     Small bowel obstruction (H) 10/29/2021     Thrombocytopenia (H) 06/08/2021        PAST SURGICAL HISTORY:  Past Surgical History:   Procedure Laterality Date     ANESTHESIA CARDIOVERSION  04/21/2011    Procedure:ANESTHESIA CARDIOVERSION; Surgeon:GENERIC ANESTHESIA PROVIDER; Location:[facility] OR     ARTHROSCOPY KNEE RT/LT      LT     CATARACT IOL, RT/LT  2017     COLONOSCOPY  10/16/2012    Procedure: COLONOSCOPY;  Colonoscopy, screening;  Surgeon: Reg Feliciano MD;  Location: [facility] OR     COLONOSCOPY N/A 04/14/2021    Procedure: COLONOSCOPY;  Surgeon: Reg Holm MD;  Location: [facility] GI     ESOPHAGOSCOPY, GASTROSCOPY, DUODENOSCOPY (EGD), COMBINED N/A 10/07/2020    Procedure: ESOPHAGOGASTRODUODENOSCOPY, WITH BIOPSY;  Surgeon: Raul Sawyer DO;  Location: [facility] OR     H ABLATION FOCAL AFIB  03/05/2013    PVI     H ABLATION FOCAL AFIB  01/01/2018     HC BONE MARROW/STEM TRANSPLANT ALLOGENIC  05/08/2007     INSERT PORT VASCULAR ACCESS       IR GASTROSTOMY TUBE PERCUTANEOUS PLCMNT  10/25/2021     JOINT REPLACEMTN, KNEE RT/LT  03/28/2012    SHAWN     LAPAROSCOPY DIAGNOSTIC (GENERAL) N/A 10/29/2021    Procedure: LAPAROSCOPY, DIAGNOSTIC, TAKEDOWN OF MALPOSITIONED GASTROSTOMY TUBE, INSERTION OF GASTROSTOMY TUBE, SMALL BOWEL RESECTION X1, PRIMARY REPAIR OF SMALL BOWEL ENTEROTOMY, ABDOMINAL WASHOUT, TAP BLOCK;  Surgeon: Leif Finney DO;  Location: [facility] OR     PEG TUBE PLACEMENT  10/25/2021     VASECTOMY  1990        MEDICATIONS:  Current Outpatient Medications   Medication     amoxicillin (AMOXIL) 500 MG capsule      carbidopa-levodopa (SINEMET)  MG tablet     flecainide (TAMBOCOR) 50 MG tablet     fluticasone (FLONASE) 50 MCG/ACT nasal spray     loratadine (CLARITIN REDITABS) 10 MG ODT     Metoprolol Succinate (KAPSPARGO) 25 MG CS24     Multiple Vitamins-Minerals (MULTIVITAMIN GUMMIES ADULT PO)     NONFORMULARY     omeprazole (PRILOSEC) 20 MG DR capsule     PREVIDENT 5000 DRY MOUTH 1.1 % GEL topical gel     rosuvastatin (CRESTOR) 40 MG tablet     vitamin D3 (CHOLECALCIFEROL) 50 mcg (2000 units) tablet     warfarin ANTICOAGULANT (COUMADIN) 2.5 MG tablet     Current Facility-Administered Medications   Medication     ondansetron (ZOFRAN) injection 4 mg        ALLERGIES:  Allergies   Allergen Reactions     Seasonal Allergies      SOCIAL HISTORY:  Social History     Socioeconomic History     Marital status:      Spouse name: Angelica     Number of children: 2     Years of education: 21     Highest education level: None   Occupational History     Occupation:      Employer: [employer]     Employer: [employer]   Tobacco Use     Smoking status: Never Smoker     Smokeless tobacco: Never Used   Substance and Sexual Activity     Alcohol use: No     Drug use: No     Sexual activity: Not Currently     Partners: Female   Other Topics Concern     Parent/sibling w/ CABG, MI or angioplasty before 65F 55M? No   Social History Narrative     about 50 yrs        Wife has some health issues - back pain - second knee replaced    She had gastric bypass and a fib and bad mitral valve        Son in [place]    Daughter in [place] with 10 yo son.         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[employer]        Born and raised in [place]              FAMILY HISTORY:  Family History   Problem Relation Age of Onset     Hypertension Mother      Cerebrovascular Disease Mother      Breast Cancer Mother      Arrhythmia Brother      Arrhythmia Sister         supraventricular tachycardia     Thyroid Disease Sister       "Other - See Comments Son         lives in [place]. no kids and not .     Obsessive Compulsive Disorder Son      Depression Son      Eating Disorder Son         eats when depressed     Obesity Son      Thyroid Cancer Daughter         had thyroid removed     Bipolar Disorder Daughter      Other - See Comments Daughter         lives in [place] - single with one son 9  yrs     Other - See Comments Sister      Alzheimer Disease Father      Diabetes Paternal Aunt      Gastrointestinal Disease Maternal Grandmother         colitis      Parkinsonism Other         2 cousins with parkinson     C.A.D. No family hx of      PHYSICAL EXAM:  Vital Signs: /57 (BP Location: Left arm, Patient Position: Sitting, Cuff Size: Adult Regular)   Pulse 67   Ht 1.803 m (5' 11\")   Wt 73 kg (160 lb 14.4 oz)   SpO2 98%   BMI 22.44 kg/m     GEN: the patient appears in N.A. D. In the surgery clinic.  ABD: soft and non-tender, no masses, no hernias, the G-tube site has a small amount of granulation tissue.   EXT: warm and well perfused.      PROCEDURE:  Haresh Rock was comfortable in supine position.  The previous 18-Kinyarwanda G-tube was removed after reducing the balloon - this was without difficulty or issue - and the #20-Kinyarwanda Jerrell-Key tube was advanced into the same location without injury or difficulty.  Gastric contents returned and the balloon was filled to 5 cm.  The patient tolerated the change out to the Jerrell-Key tube with a 2 cm length and a 20 cm Kinyarwanda diameter.  The placement was without difficulty.  A small amount of granulation tissue was treated with silver nitrate sticks and the patient tolerated this as well.  The patient's abdomen is soft and nontender.  The patient is in no apparent distress.     IMPRESSION:  Mr. Haresh Rock, a 72-year-old patient with Parkinson's disease, who had failed multiple swallowing studies, now has a Jerrell-Key tube.  The patient will care for the tube in the standard way.  At " this point, these tubes should probably be replaced every 6 months.  The patient will call or return to arrange for a replacement.  He will also call or return should the Jerrell-Key tube not work and would like to have it replaced with a standard G-tube.         Answers for HPI/ROS submitted by the patient on 7/18/2022  General Symptoms: No  Skin Symptoms: No  HENT Symptoms: No  EYE SYMPTOMS: No  HEART SYMPTOMS: No  LUNG SYMPTOMS: Yes  INTESTINAL SYMPTOMS: No  URINARY SYMPTOMS: No  REPRODUCTIVE SYMPTOMS: No  SKELETAL SYMPTOMS: No  BLOOD SYMPTOMS: No  NERVOUS SYSTEM SYMPTOMS: No  MENTAL HEALTH SYMPTOMS: No  Cough: No  Sputum or phlegm: Yes  Coughing up blood: No  Difficulty breating or shortness of breath: Yes  Snoring: No  Wheezing: No  Difficulty breathing on exertion: No  Nighttime Cough: No  Difficulty breathing when lying flat: No

## 2022-07-29 ENCOUNTER — ANTICOAGULATION THERAPY VISIT (OUTPATIENT)
Dept: ANTICOAGULATION | Facility: CLINIC | Age: 72
End: 2022-07-29

## 2022-07-29 ENCOUNTER — TRANSFERRED RECORDS (OUTPATIENT)
Dept: HEALTH INFORMATION MANAGEMENT | Facility: CLINIC | Age: 72
End: 2022-07-29

## 2022-07-29 DIAGNOSIS — Z79.01 LONG TERM CURRENT USE OF ANTICOAGULANT THERAPY: Primary | ICD-10-CM

## 2022-07-29 DIAGNOSIS — I48.91 ATRIAL FIBRILLATION (H): ICD-10-CM

## 2022-07-29 DIAGNOSIS — I48.0 PAROXYSMAL ATRIAL FIBRILLATION (H): ICD-10-CM

## 2022-07-29 DIAGNOSIS — I48.20 CHRONIC ATRIAL FIBRILLATION (H): ICD-10-CM

## 2022-07-29 DIAGNOSIS — Z86.711 PERSONAL HISTORY OF PE (PULMONARY EMBOLISM): ICD-10-CM

## 2022-07-29 LAB — INR (EXTERNAL): 2.6 (ref 0.9–1.1)

## 2022-07-29 NOTE — PROGRESS NOTES
"Received a fax INR result for Haresh from [facility]    INR result dated 7/29/2022 is 2.6-Unable to read fax as the \"6\" as cut off.  Called patient to confirm INR result    ANTICOAGULATION MANAGEMENT     Haresh Rock 72 year old male is on warfarin with therapeutic INR result. (Goal INR 2.0-3.0)    Recent labs: (last 7 days)     07/29/22  0000   INR 2.6*       ASSESSMENT       Source(s): Chart Review and Patient/Caregiver Call       Warfarin doses taken: Warfarin taken as instructed    Diet: No new diet changes identified    New illness, injury, or hospitalization: No    Medication/supplement changes: None noted    Signs or symptoms of bleeding or clotting: No    Previous INR: Therapeutic last 2(+) visits    Additional findings: None       PLAN     Recommended plan for no diet, medication or health factor changes affecting INR     Dosing Instructions: Continue your current warfarin dose with next INR in 1 week       Summary  As of 7/29/2022    Full warfarin instructions:  5 mg every Fri; 3.75 mg all other days   Next INR check:  8/5/2022             Telephone call with Haresh who verbalizes understanding and agrees to plan    Patient to recheck with home meter    Education provided: Please call back if any changes to your diet, medications or how you've been taking warfarin and Resume manage by exception with home monitor. Continue to submit INR results to home monitor company.You will only be called when your result is out of range. Please call and notify [facility] if new medication started, dose missed, signs or symptoms of bleeding or clotting, or a surgery/procedure is scheduled.    Plan made per [facility] anticoagulation protocol    Mayi Ayala RN  Anticoagulation Clinic  7/29/2022    _______________________________________________________________________     Anticoagulation Episode Summary     Current INR goal:  2.0-3.0   TTR:  81.3 % (11.7 mo)   Target end date:  Indefinite   Send INR reminders to:  CHELSEA CHILEL    " Indications    Long-term (current) use of anticoagulants [Z79.01] [Z79.01]  Atrial fibrillation (H) [I48.91]  Antiphospholipid antibody syndrome (H) (Resolved) [D68.61]  Personal history of DVT (deep vein thrombosis) (Resolved) [Z86.718]  Atrial fibrillation  unspecified type (H) (Resolved) [I48.91]  Paroxysmal atrial fibrillation (H) [I48.0]  Chronic atrial fibrillation (H) [I48.20]  Personal history of PE (pulmonary embolism) [Z86.711]           Comments:  JESSICA rodas to manage by exception         Anticoagulation Care Providers     Provider Role Specialty Phone number    Anya Nowak MD Referring Family Medicine [phone]

## 2022-08-01 NOTE — PROGRESS NOTES
Community Health Worker Follow Up    Care Gaps:     Health Maintenance Due   Topic Date Due     ZOSTER IMMUNIZATION (1 of 2) 11/14/2016     DIABETIC FOOT EXAM  04/23/2020     EYE EXAM  01/01/2022     MICROALBUMIN  07/27/2022     MEDICARE ANNUAL WELLNESS VISIT  07/27/2022     COVID-19 Vaccine (5 - Booster for Moderna series) 08/06/2022     LIPID  08/20/2022       Patient will call to schedule AWV and address additional care gaps.    Goals:    Goals Addressed as of 8/1/2022 at 8:52 AM                    7/26/22 6/16/22       #1  Other - Care Gaps (pt-stated)   90%  80%    Added 12/13/21 by Uriel Van, RN      Goal Statement: I will work with my PCP to close my care gaps by scheduling an eye exam, and scheduling a diabetic foot exam.  Date Goal set: 12/13/21  Barriers: The patient suffers from Parkinson.  Strengths: engaged in care coordination  Date to Achieve By: 6/13/23  Patient expressed understanding of goal: yes  Action steps to achieve this goal:  1. I will speak with my PCP about scheduling a diabetic foot exam.  2. I will make an appointment for an annual eye exam.  3. I will ask my PCP about which immunization are appropriate for me.    Discussed 7/26/22           #2  Functional (pt-stated)   100%  80%    Added 12/13/21 by Uriel Van, RN      Goal Statement: I will work on walking without the walker, and increasing my strength and stamina.  Date Goal set: 12/13/21  Barriers: suffers from parkinson's  Strengths: engaged in care coordination  Date to Achieve By: 6/13/23  Patient expressed understanding of goal: yes  Action steps to achieve this goal:  1. I will practice in the house walking without the walker. No longer using the walker as of 3/15/22.  2. I will go outside without the walker when I feel strong enough and have increased my strength and stamina.  3. I will frequently walk around inside the house to increase my strength and stamina.    7/26/22                Intervention and Education  during outreach: Patient has not used his walker for the last 6 months and has had no falls. Will send chart review to CC RN to complete goal.    He had his feeding tube replaced recently and that is doing well.     He went to [facility] with his son recently and it was a good trip and he was active on this trip.     CHW Plan: CHW will continue to support patient with goals through routine scheduled outreach.     Next outreach due: 8/24/22

## 2022-08-05 ENCOUNTER — PATIENT OUTREACH (OUTPATIENT)
Dept: CARE COORDINATION | Facility: CLINIC | Age: 72
End: 2022-08-05

## 2022-08-05 ENCOUNTER — TRANSFERRED RECORDS (OUTPATIENT)
Dept: HEALTH INFORMATION MANAGEMENT | Facility: CLINIC | Age: 72
End: 2022-08-05

## 2022-08-05 ENCOUNTER — DOCUMENTATION ONLY (OUTPATIENT)
Dept: ANTICOAGULATION | Facility: CLINIC | Age: 72
End: 2022-08-05

## 2022-08-05 DIAGNOSIS — Z86.711 PERSONAL HISTORY OF PE (PULMONARY EMBOLISM): ICD-10-CM

## 2022-08-05 DIAGNOSIS — I48.0 PAROXYSMAL ATRIAL FIBRILLATION (H): ICD-10-CM

## 2022-08-05 DIAGNOSIS — Z79.01 LONG TERM CURRENT USE OF ANTICOAGULANT THERAPY: Primary | ICD-10-CM

## 2022-08-05 DIAGNOSIS — I48.20 CHRONIC ATRIAL FIBRILLATION (H): ICD-10-CM

## 2022-08-05 LAB — INR (EXTERNAL): 2.5 (ref 0.9–1.1)

## 2022-08-05 NOTE — PROGRESS NOTES
Clinic Care Coordination Contact  Care Team Conversations    Care Coordination Clinician Chart Review     Situation: Patient chart reviewed by care coordinator.?     Background: Initial assessment and enrollment to Care Coordination was 11/5/21.?? Patient centered goals were developed with participation from patient.? Lead CC handed patient off to CHW for continued outreach every 30 days.??     Assessment: Per chart review, patient outreach completed by CC CHW on 7/26/22.? Patient is actively working to accomplish goal(s).? Patient's goal(s) remain(s) appropriate at this time.? Patient is due for updated Plan of Care.? Annual assessment will be due 11/5/22.      Goals        #1  Other - Care Gaps (pt-stated)       Goal Statement: I will work with my PCP to close my care gaps by scheduling an eye exam, and scheduling a diabetic foot exam.  Date Goal set: 12/13/21  Barriers: The patient suffers from Parkinson.  Strengths: engaged in care coordination  Date to Achieve By: 6/13/23  Patient expressed understanding of goal: yes  Action steps to achieve this goal:  1. I will speak with my PCP about scheduling a diabetic foot exam.  2. I will make an appointment for an annual eye exam.  3. I will ask my PCP about which immunization are appropriate for me.    Discussed 7/26/22           #2  Functional (pt-stated)       Goal Statement: I will work on walking without the walker, and increasing my strength and stamina.  Date Goal set: 12/13/21  Barriers: suffers from parkinson's  Strengths: engaged in care coordination  Date to Achieve By: 6/13/23  Patient expressed understanding of goal: yes  Action steps to achieve this goal:  1. I will practice in the house walking without the walker. No longer using the walker as of 3/15/22. Completed action step  2. I will go outside without the walker when I feel strong enough and have increased my strength and stamina.  Continue to work on for at least a year.  3. I will frequently walk  around inside the house to increase my strength and stamina.  Continue to work on for at least a year.    7/26/22             #3  Other- Care Gaps (pt-stated)       Goal Statement: I will complete my annual wellness visit.  Date Goal set: 8/5/22  Barriers: suffers from parkinsons  Strengths: engaged in care coordination   Date to Achieve By: 8/5/23  Patient expressed understanding of goal: yes  Action steps to achieve this goal:  1. I will schedule my annual wellness visit; [phone] ([phone])  2. I will attend my annual wellness visit.  3. I will contact my Care Management or clinic team if I have barriers to attending my annual wellness visit.             ??     Plan/Recommendations: The patient will continue working with Care Coordination to achieve above goal(s).? CHW will involve Lead CC as needed or if patient is ready to move to maintenance.? Lead CC will continue to monitor CHW s monthly outreaches and progress to goal(s) every 6 weeks.?     Plan of Care updated and sent to patient: Yes, Celeste Zhou MSN, RN, PHN, CCM   Primary Care Clinical RN Care Coordinator  [facility]  8/5/2022   9:34 AM  [email or web address]  Office: [phone]

## 2022-08-05 NOTE — LETTER
[facility]  Patient Centered Plan of Care  About Me:        Patient Name:  Haresh Granados    YOB: 1950  Age:         72 year old   [place] MRN:    [id number] Telephone Information:  Home Phone [phone]   Mobile [phone]       Address:  [address] [place] [address] Email address:  [email or web address]      Emergency Contact(s)    Name Relationship Lgl Grd Work Phone Home Phone Mobile Phone   1. JANICE GRANADOS Spouse No   [phone]   2. LAY GRANADOS Daughter No   [phone]   3. SOL GRANADOS Son No   [phone]           Primary language:  English     needed? No   [place] [facility]:  [phone] op. 1  Other communication barriers:Glasses; Language barrier    Preferred Method of Communication:  Mail  Current living arrangement: I live in a private home with family    Mobility Status/ Medical Equipment: Independent        Health Maintenance  Health Maintenance Reviewed: Due/Overdue   Health Maintenance Due   Topic Date Due     ZOSTER IMMUNIZATION (1 of 2) 11/14/2016     DIABETIC FOOT EXAM  04/23/2020     EYE EXAM  01/01/2022     MICROALBUMIN  07/27/2022     MEDICARE ANNUAL WELLNESS VISIT  07/27/2022     COVID-19 Vaccine (5 - Booster for Moderna series) 08/06/2022     LIPID  08/20/2022           My Access Plan  Medical Emergency 911   Primary Clinic Line [facility] - [phone]   24 Hour Appointment Line [phone] or  [phone]) (toll-free)   24 Hour Nurse Line [phone] (toll-free)   Preferred Urgent Care No data recorded   Preferred Hospital [facility]  [phone]     Preferred Pharmacy [id number] - [place] - [address] [facility]     Behavioral Health Crisis Line The National Suicide Prevention Lifeline at 1-524.127.7644 or 328             My Care Team Members  Patient Care Team       Relationship Specialty Notifications Start End    She,  Anya Solano MD PCP - General Family Practice  4/23/19     Phone: [phone] Fax: [phone] [address] [facility]. [facility] [address]    Augusto Miller MD MD Hematology Admissions 9/30/11     Referred to Endocrinology    Phone: [phone] Fax: [phone]         [address] [facility] [address]    Jesusita Mejia PA-C Physician Assistant   3/21/12     Phone: [phone] Fax: [phone]         [address] [facility] [address] [facility] [address]    Pino Sharma MD MD Internal Medicine  4/11/16     Phone: [phone] Fax: [phone]         [address] [facility] [address] [facility] [address]    Fabi Jimenez MD MD INTERNAL MEDICINE - ENDOCRINOLOGY, DIABETES & METABOLISM  4/18/16     Phone: [phone] Fax: [phone]         [address] [facility] [address] [facility] [address]    Bekah Matt MD MD Cardiology  8/12/16     Phone: [phone] Fax: [phone]         [address] [place] [address] [address] [facility] [address]    Tina Mejia, RN Specialty Care Coordinator Cardiology  3/6/19     Phone: [phone] Fax: [phone]         [address] [facility] [address]    Anya Nowak MD Assigned PCP   4/28/19     Phone: [phone] Fax: [phone] [address] [facility] [address]    Bekah Matt MD Assigned Heart and Vascular Provider   10/23/20     Phone: [phone] Fax: [phone]         [address] [facility] [address] [place] [address]    Ángel Hill MD Assigned Neuroscience Provider   11/15/20     Phone: [phone] Fax: [phone]         [address] [place] [address]    Rosalva Samuels MD MD Family Medicine  6/1/21     Phone: [phone] Fax: [phone] [address] [place] [address]    Jose G Hawley MD Assigned Surgical Provider   8/1/21     Phone: [phone] Fax: [phone] [address] [place] [address]    Bekah Matt MD MD Clinical Cardiac Electrophysiology  9/10/21      Phone: [phone] Fax: [phone]         [address] [place] [address] [address] [facility] [address]    Marcelo Jacques MD Assigned Infectious Disease Provider   9/12/21     Phone: [phone] Fax: [phone]         [address] [facility] [address]    Uriel Van, RN Lead Care Coordinator Primary Care - CC Admissions 11/5/21     Phone: [phone] Fax: [phone]        Lizbeth Chen [facility] Pharmacist Pharmacist Ambulatory Care  11/12/21     Phone: [phone] [address] [facility] [address]    Mica Edwards, PhD LP Assigned Behavioral Health Provider   11/21/21     Phone: [phone] Fax: [phone]         [address] [facility] [address]    Vahid Edward MD Assigned Pulmonology Provider   12/12/21     Phone: [phone] Fax: [phone]         [address] [place] [address] [address] [facility] [address]    Leslie Clifford [facility] Pharmacist Pharmacist  12/13/21     Phone: [phone] Pager: [phone]         [address] [facility] [address]    Bonnie Walls RN Specialty Care Coordinator Hematology & Oncology  12/16/21     Phone: [phone] Pager: [phone]        Maye Edmondson Community Health Worker Primary Care - CC  4/18/22     Phone: [phone] Fax: [phone]        Reg Cantu MD MD Critical Care  6/9/22     Phone: [phone] Fax: [phone]         [address] [place] [address] [address] [facility] [address]    Lizbeth Chen [facility] Assigned MTM Pharmacist   6/25/22     Phone: [phone] [address] [facility] [address]            My Care Plans  Self Management and Treatment Plan  Goals and (Comments)   Goals        General     #1  Other - Care Gaps (pt-stated)      Notes - Note edited  8/1/2022  8:51 AM by Maye Edmondson     Goal Statement: I will work with my PCP to close my care gaps by scheduling an eye exam, and scheduling a diabetic foot exam.  Date Goal set: 12/13/21  Barriers: The patient suffers from Parkinson.  Strengths:  engaged in care coordination  Date to Achieve By: 6/13/23  Patient expressed understanding of goal: yes  Action steps to achieve this goal:  1. I will speak with my PCP about scheduling a diabetic foot exam.  2. I will make an appointment for an annual eye exam.  3. I will ask my PCP about which immunization are appropriate for me.    Discussed 7/26/22         #2  Functional (pt-stated)      Notes - Note edited  8/5/2022  9:27 AM by Uriel Van, RN     Goal Statement: I will work on walking without the walker, and increasing my strength and stamina.  Date Goal set: 12/13/21  Barriers: suffers from parkinson's  Strengths: engaged in care coordination  Date to Achieve By: 6/13/23  Patient expressed understanding of goal: yes  Action steps to achieve this goal:  1. I will practice in the house walking without the walker. No longer using the walker as of 3/15/22. Completed action step  2. I will go outside without the walker when I feel strong enough and have increased my strength and stamina.  Continue to work on for at least a year.  3. I will frequently walk around inside the house to increase my strength and stamina.  Continue to work on for at least a year.    7/26/22           #3  Other- Care Gaps (pt-stated)      Notes - Note created  8/5/2022  9:30 AM by Uriel Van, RN     Goal Statement: I will complete my annual wellness visit.  Date Goal set: 8/5/22  Barriers: suffers from parkinsons  Strengths: engaged in care coordination   Date to Achieve By: 8/5/23  Patient expressed understanding of goal: yes  Action steps to achieve this goal:  1. I will schedule my annual wellness visit; [id number] ([phone])  2. I will attend my annual wellness visit.  3. I will contact my Care Management or clinic team if I have barriers to attending my annual wellness visit.                  Action Plans on File:                       Advance Care Plans/Directives Type:   Advanced Directive - On File      My Medical and  Care Information  Problem List   Patient Active Problem List   Diagnosis     Hemochromatosis     Kdzhc-skqndz-qicd disease, chronic (H)     Advanced directives, counseling/discussion     Polyneuropathy     Status post total knee replacements     Status post bone marrow transplant (H)     Hyperlipidemia with target LDL less than 100     Atrial fibrillation (H)     Chronic rhinitis     Gallstones without obstruction of gallbladder     Long-term (current) use of anticoagulants [Z79.01]     Thyroid nodule     Type 2 diabetes mellitus with stage 2 chronic kidney disease, without long-term current use of insulin (H)     History of Hodgkin's lymphoma     History of irradiation, presenting hazards to health     Primary pulmonary hypertension (H)     Hereditary hemochromatosis (H)     Oropharyngeal dysphagia     Articulation disorder     Personal history of PE (pulmonary embolism)     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    Care Coordination Start Date: 11/5/2021   Frequency of Care Coordination: monthly     Form Last Updated: 08/05/2022

## 2022-08-05 NOTE — PROGRESS NOTES
ANTICOAGULATION  MANAGEMENT-Home Monitor Managed by Exception    Haresh EMILIE Rock 72 year old male is on warfarin with therapeutic INR result. (Goal INR 2.0-3.0)    Recent labs: (last 7 days)     08/05/22  1153   INR 2.5*         Previous INR was Therapeutic    Medication, diet, health changes since last INR:chart reviewed; none identified    Contacted within the last 12 weeks by phone on 7/29/22      MINNIE Hutson was NOT contacted regarding therapeutic result today per home monitoring policy manage by exception agreement.   Current warfarin dose is to be continued:     Summary  As of 8/5/2022    Full warfarin instructions:  5 mg every Fri; 3.75 mg all other days   Next INR check:  8/12/2022           ?   Allyn Doe RN  Anticoagulation Clinic  8/5/2022    _______________________________________________________________________     Anticoagulation Episode Summary     Current INR goal:  2.0-3.0   TTR:  81.3 % (11.7 mo)   Target end date:  Indefinite   Send INR reminders to:  CHELSEA CHILEL    Indications    Long-term (current) use of anticoagulants [Z79.01] [Z79.01]  Atrial fibrillation (H) [I48.91]  Antiphospholipid antibody syndrome (H) (Resolved) [D68.61]  Personal history of DVT (deep vein thrombosis) (Resolved) [Z86.718]  Atrial fibrillation  unspecified type (H) (Resolved) [I48.91]  Paroxysmal atrial fibrillation (H) [I48.0]  Chronic atrial fibrillation (H) [I48.20]  Personal history of PE (pulmonary embolism) [Z86.711]           Comments:  JESSICA rodas to manage by exception         Anticoagulation Care Providers     Provider Role Specialty Phone number    Anya Nowak MD Referring Family Medicine [phone]

## 2022-08-12 ENCOUNTER — DOCUMENTATION ONLY (OUTPATIENT)
Dept: ANTICOAGULATION | Facility: CLINIC | Age: 72
End: 2022-08-12

## 2022-08-12 DIAGNOSIS — I48.20 CHRONIC ATRIAL FIBRILLATION (H): ICD-10-CM

## 2022-08-12 DIAGNOSIS — Z86.711 PERSONAL HISTORY OF PE (PULMONARY EMBOLISM): ICD-10-CM

## 2022-08-12 DIAGNOSIS — I48.91 ATRIAL FIBRILLATION (H): ICD-10-CM

## 2022-08-12 DIAGNOSIS — Z79.01 LONG TERM CURRENT USE OF ANTICOAGULANT THERAPY: Primary | ICD-10-CM

## 2022-08-12 DIAGNOSIS — I48.0 PAROXYSMAL ATRIAL FIBRILLATION (H): ICD-10-CM

## 2022-08-12 LAB — INR (EXTERNAL): 2.6 (ref 0.9–1.1)

## 2022-08-12 NOTE — PROGRESS NOTES
ANTICOAGULATION  MANAGEMENT-Home Monitor Managed by Exception    Haresh EMILIE Rock 72 year old male is on warfarin with therapeutic INR result. (Goal INR 2.0-3.0)    Recent labs: (last 7 days)     08/12/22  0921   INR 2.6*         Previous INR was Therapeutic    Medication, diet, health changes since last INR:chart reviewed; none identified    Contacted within the last 12 weeks by phone on  7/29/22           MINNIE Hutson was NOT contacted regarding therapeutic result today per home monitoring policy manage by exception agreement.   Current warfarin dose is to be continued:     Summary  As of 8/12/2022    Full warfarin instructions:  5 mg every Fri; 3.75 mg all other days   Next INR check:  8/19/2022           ?   Blanca Rodriguez RN  Anticoagulation Clinic  8/12/2022    _______________________________________________________________________     Anticoagulation Episode Summary     Current INR goal:  2.0-3.0   TTR:  83.3 % (11.7 mo)   Target end date:  Indefinite   Send INR reminders to:  CHELSEA CHILEL    Indications    Long-term (current) use of anticoagulants [Z79.01] [Z79.01]  Atrial fibrillation (H) [I48.91]  Antiphospholipid antibody syndrome (H) (Resolved) [D68.61]  Personal history of DVT (deep vein thrombosis) (Resolved) [Z86.718]  Atrial fibrillation  unspecified type (H) (Resolved) [I48.91]  Paroxysmal atrial fibrillation (H) [I48.0]  Chronic atrial fibrillation (H) [I48.20]  Personal history of PE (pulmonary embolism) [Z86.711]           Comments:  JESSICA rodas to manage by exception         Anticoagulation Care Providers     Provider Role Specialty Phone number    Anya Nowak MD Referring Family Medicine [phone]

## 2022-08-19 ENCOUNTER — DOCUMENTATION ONLY (OUTPATIENT)
Dept: FAMILY MEDICINE | Facility: CLINIC | Age: 72
End: 2022-08-19

## 2022-08-19 ENCOUNTER — TRANSFERRED RECORDS (OUTPATIENT)
Dept: HEALTH INFORMATION MANAGEMENT | Facility: CLINIC | Age: 72
End: 2022-08-19

## 2022-08-19 DIAGNOSIS — I48.20 CHRONIC ATRIAL FIBRILLATION (H): ICD-10-CM

## 2022-08-19 DIAGNOSIS — I48.0 PAROXYSMAL ATRIAL FIBRILLATION (H): ICD-10-CM

## 2022-08-19 DIAGNOSIS — Z79.01 LONG TERM CURRENT USE OF ANTICOAGULANT THERAPY: Primary | ICD-10-CM

## 2022-08-19 DIAGNOSIS — Z86.711 PERSONAL HISTORY OF PE (PULMONARY EMBOLISM): ICD-10-CM

## 2022-08-19 LAB — INR (EXTERNAL): 2.8 (ref 0.9–1.1)

## 2022-08-19 NOTE — PROGRESS NOTES
ANTICOAGULATION  MANAGEMENT-Home Monitor Managed by Exception    Haresh EMILIE Rock 72 year old male is on warfarin with therapeutic INR result. (Goal INR 2.0-3.0)    Recent labs: (last 7 days)     08/19/22  0922   INR 2.8*         Previous INR was Therapeutic    Medication, diet, health changes since last INR:chart reviewed; none identified    Contacted within the last 12 weeks by phone on 7/29/22      MINNIE Hutson was NOT contacted regarding therapeutic result today per home monitoring policy manage by exception agreement.   Current warfarin dose is to be continued:     Summary  As of 8/19/2022    Full warfarin instructions:  5 mg every Fri; 3.75 mg all other days   Next INR check:  8/26/2022           ?   Allyn Doe RN  Anticoagulation Clinic  8/19/2022    _______________________________________________________________________     Anticoagulation Episode Summary     Current INR goal:  2.0-3.0   TTR:  84.5 % (11.7 mo)   Target end date:  Indefinite   Send INR reminders to:  CHELSEA CHILEL    Indications    Long-term (current) use of anticoagulants [Z79.01] [Z79.01]  Atrial fibrillation (H) [I48.91]  Antiphospholipid antibody syndrome (H) (Resolved) [D68.61]  Personal history of DVT (deep vein thrombosis) (Resolved) [Z86.718]  Atrial fibrillation  unspecified type (H) (Resolved) [I48.91]  Paroxysmal atrial fibrillation (H) [I48.0]  Chronic atrial fibrillation (H) [I48.20]  Personal history of PE (pulmonary embolism) [Z86.711]           Comments:  JESSICA rodas to manage by exception         Anticoagulation Care Providers     Provider Role Specialty Phone number    Anya Nowak MD Referring Family Medicine [phone]

## 2022-08-24 ENCOUNTER — TELEPHONE (OUTPATIENT)
Dept: PHARMACY | Facility: CLINIC | Age: 72
End: 2022-08-24

## 2022-08-24 DIAGNOSIS — N18.2 TYPE 2 DIABETES MELLITUS WITH STAGE 2 CHRONIC KIDNEY DISEASE, WITHOUT LONG-TERM CURRENT USE OF INSULIN (H): Primary | ICD-10-CM

## 2022-08-24 DIAGNOSIS — E11.22 TYPE 2 DIABETES MELLITUS WITH STAGE 2 CHRONIC KIDNEY DISEASE, WITHOUT LONG-TERM CURRENT USE OF INSULIN (H): Primary | ICD-10-CM

## 2022-08-24 NOTE — TELEPHONE ENCOUNTER
Working quality list. Patient due for A1C this calendar year, called and spoke with patient, scheduled this for tomorrow.    Emi Chen, MatheusD  Medication Therapy Management Pharmacist  [phone]

## 2022-08-25 ENCOUNTER — LAB (OUTPATIENT)
Dept: LAB | Facility: CLINIC | Age: 72
End: 2022-08-25
Payer: COMMERCIAL

## 2022-08-25 DIAGNOSIS — N18.2 TYPE 2 DIABETES MELLITUS WITH STAGE 2 CHRONIC KIDNEY DISEASE, WITHOUT LONG-TERM CURRENT USE OF INSULIN (H): ICD-10-CM

## 2022-08-25 DIAGNOSIS — E11.22 TYPE 2 DIABETES MELLITUS WITH STAGE 2 CHRONIC KIDNEY DISEASE, WITHOUT LONG-TERM CURRENT USE OF INSULIN (H): ICD-10-CM

## 2022-08-25 LAB — HBA1C MFR BLD: 6.3 % (ref 0–5.6)

## 2022-08-25 PROCEDURE — 83036 HEMOGLOBIN GLYCOSYLATED A1C: CPT

## 2022-08-25 PROCEDURE — 36415 COLL VENOUS BLD VENIPUNCTURE: CPT

## 2022-08-26 ENCOUNTER — ANTICOAGULATION THERAPY VISIT (OUTPATIENT)
Dept: ANTICOAGULATION | Facility: CLINIC | Age: 72
End: 2022-08-26

## 2022-08-26 ENCOUNTER — TRANSFERRED RECORDS (OUTPATIENT)
Dept: HEALTH INFORMATION MANAGEMENT | Facility: CLINIC | Age: 72
End: 2022-08-26

## 2022-08-26 DIAGNOSIS — I48.0 PAROXYSMAL ATRIAL FIBRILLATION (H): ICD-10-CM

## 2022-08-26 DIAGNOSIS — I48.20 CHRONIC ATRIAL FIBRILLATION (H): ICD-10-CM

## 2022-08-26 DIAGNOSIS — I48.91 ATRIAL FIBRILLATION (H): ICD-10-CM

## 2022-08-26 DIAGNOSIS — Z86.711 PERSONAL HISTORY OF PE (PULMONARY EMBOLISM): ICD-10-CM

## 2022-08-26 DIAGNOSIS — Z79.01 LONG TERM CURRENT USE OF ANTICOAGULANT THERAPY: Primary | ICD-10-CM

## 2022-08-26 LAB — INR (EXTERNAL): 3.1 (ref 0.9–1.1)

## 2022-08-26 NOTE — PROGRESS NOTES
ANTICOAGULATION MANAGEMENT     Haresh Rock 72 year old male is on warfarin with supratherapeutic INR result. (Goal INR 2.0-3.0)    Recent labs: (last 7 days)     08/26/22  1732   INR 3.1*       ASSESSMENT       Source(s): Chart Review and Patient/Caregiver Call       Warfarin doses taken: Warfarin taken as instructed    Diet: No new diet changes identified    New illness, injury, or hospitalization: No    Medication/supplement changes: None noted    Signs or symptoms of bleeding or clotting: No    Previous INR: Therapeutic last 2(+) visits    Additional findings: None. No factors to explain elevated INR.       PLAN     Recommended plan for no diet, medication or health factor changes affecting INR     Dosing Instructions: Continue your current warfarin dose with next INR in 1 week       Summary  As of 8/26/2022    Full warfarin instructions:  5 mg every Fri; 3.75 mg all other days   Next INR check:  9/2/2022             Telephone call with Haresh who verbalizes understanding and agrees to plan    Patient to recheck with home meter    Education provided: Goal range and significance of current result    Plan made per [facility] anticoagulation protocol    Hari Saavedra RN  Anticoagulation Clinic  8/26/2022    _______________________________________________________________________     Anticoagulation Episode Summary     Current INR goal:  2.0-3.0   TTR:  84.0 % (11.9 mo)   Target end date:  Indefinite   Send INR reminders to:  CHELSEA CHILEL    Indications    Long-term (current) use of anticoagulants [Z79.01] [Z79.01]  Atrial fibrillation (H) [I48.91]  Antiphospholipid antibody syndrome (H) (Resolved) [D68.61]  Personal history of DVT (deep vein thrombosis) (Resolved) [Z86.718]  Atrial fibrillation  unspecified type (H) (Resolved) [I48.91]  Paroxysmal atrial fibrillation (H) [I48.0]  Chronic atrial fibrillation (H) [I48.20]  Personal history of PE (pulmonary embolism) [Z86.711]           Comments:  JESSICA rodas to manage by  exception         Anticoagulation Care Providers     Provider Role Specialty Phone number    Anya Nowak MD Referring Family Medicine [phone]

## 2022-08-28 ENCOUNTER — PATIENT OUTREACH (OUTPATIENT)
Dept: CARE COORDINATION | Facility: CLINIC | Age: 72
End: 2022-08-28

## 2022-08-28 NOTE — PROGRESS NOTES
Clinic Care Coordination - Chart Review Only    Situation/Background: Patient chart reviewed by care coordinator related to [facility] [facility] conversion.    Assessment: Patient continues to be followed by Clinic Care Coordination.    Plan: Patient's chart updated to align with [employer] program for ongoing patient management.    Uriel Zhou MSN, RN, PHN, [facility]   Primary Care Clinical RN Care Coordinator  [facility]  8/28/2022   3:47 PM  [email or web address]  Office: [phone]

## 2022-09-02 ENCOUNTER — DOCUMENTATION ONLY (OUTPATIENT)
Dept: ANTICOAGULATION | Facility: CLINIC | Age: 72
End: 2022-09-02

## 2022-09-02 ENCOUNTER — TRANSFERRED RECORDS (OUTPATIENT)
Dept: HEALTH INFORMATION MANAGEMENT | Facility: CLINIC | Age: 72
End: 2022-09-02

## 2022-09-02 DIAGNOSIS — I48.20 CHRONIC ATRIAL FIBRILLATION (H): ICD-10-CM

## 2022-09-02 DIAGNOSIS — Z86.711 PERSONAL HISTORY OF PE (PULMONARY EMBOLISM): ICD-10-CM

## 2022-09-02 DIAGNOSIS — Z79.01 LONG TERM CURRENT USE OF ANTICOAGULANT THERAPY: Primary | ICD-10-CM

## 2022-09-02 DIAGNOSIS — I48.0 PAROXYSMAL ATRIAL FIBRILLATION (H): ICD-10-CM

## 2022-09-02 LAB — INR (EXTERNAL): 2.9 (ref 0.9–1.1)

## 2022-09-02 NOTE — PROGRESS NOTES
ANTICOAGULATION MANAGEMENT     Haresh Rock 72 year old male is on warfarin with therapeutic INR result. (Goal INR 2.0-3.0)    Recent labs: (last 7 days)     09/02/22  0851   INR 2.9*       ASSESSMENT       Source(s): Chart Review and Patient/Caregiver Call       Warfarin doses taken: Warfarin taken as instructed    Diet: No new diet changes identified    New illness, injury, or hospitalization: No    Medication/supplement changes: None noted    Signs or symptoms of bleeding or clotting: No    Previous INR: Supratherapeutic    Additional findings: None       PLAN     Recommended plan for no diet, medication or health factor changes affecting INR     Dosing Instructions: Continue your current warfarin dose with next INR in 1 week       Summary  As of 9/2/2022    Full warfarin instructions:  5 mg every Fri; 3.75 mg all other days   Next INR check:  9/9/2022             Sent [employer] message with dosing and follow up instructions    Patient to recheck with home meter    Education provided: Please call back if any changes to your diet, medications or how you've been taking warfarin    Plan made per [facility] anticoagulation protocol    Allyn Doe, RN  Anticoagulation Clinic  9/2/2022    _______________________________________________________________________     Anticoagulation Episode Summary     Current INR goal:  2.0-3.0   TTR:  83.1 % (11.9 mo)   Target end date:  Indefinite   Send INR reminders to:  CHELSEA CHILEL    Indications    Long-term (current) use of anticoagulants [Z79.01] [Z79.01]  Atrial fibrillation (H) [I48.91]  Antiphospholipid antibody syndrome (H) (Resolved) [D68.61]  Personal history of DVT (deep vein thrombosis) (Resolved) [Z86.718]  Atrial fibrillation  unspecified type (H) (Resolved) [I48.91]  Paroxysmal atrial fibrillation (H) [I48.0]  Chronic atrial fibrillation (H) [I48.20]  Personal history of PE (pulmonary embolism) [Z86.711]           Comments:  JESSICA villafuerteay to manage by exception          Anticoagulation Care Providers     Provider Role Specialty Phone number    Anya Nowak MD Referring Family Medicine [phone]

## 2022-09-08 DIAGNOSIS — I50.21 ACUTE SYSTOLIC (CONGESTIVE) HEART FAILURE (H): ICD-10-CM

## 2022-09-09 ENCOUNTER — DOCUMENTATION ONLY (OUTPATIENT)
Dept: ANTICOAGULATION | Facility: CLINIC | Age: 72
End: 2022-09-09

## 2022-09-09 ENCOUNTER — TRANSFERRED RECORDS (OUTPATIENT)
Dept: HEALTH INFORMATION MANAGEMENT | Facility: CLINIC | Age: 72
End: 2022-09-09

## 2022-09-09 DIAGNOSIS — I48.91 ATRIAL FIBRILLATION (H): ICD-10-CM

## 2022-09-09 DIAGNOSIS — I48.0 PAROXYSMAL ATRIAL FIBRILLATION (H): ICD-10-CM

## 2022-09-09 DIAGNOSIS — Z86.711 PERSONAL HISTORY OF PE (PULMONARY EMBOLISM): ICD-10-CM

## 2022-09-09 DIAGNOSIS — I48.20 CHRONIC ATRIAL FIBRILLATION (H): ICD-10-CM

## 2022-09-09 DIAGNOSIS — Z79.01 LONG TERM CURRENT USE OF ANTICOAGULANT THERAPY: Primary | ICD-10-CM

## 2022-09-09 LAB — INR (EXTERNAL): 2.8 (ref 0.9–1.1)

## 2022-09-09 RX ORDER — ROSUVASTATIN CALCIUM 40 MG/1
TABLET, COATED ORAL
Qty: 90 TABLET | Refills: 0 | Status: SHIPPED | OUTPATIENT
Start: 2022-09-09 | End: 2022-10-11

## 2022-09-09 NOTE — PROGRESS NOTES
ANTICOAGULATION  MANAGEMENT-Home Monitor Managed by Exception    Haresh EMILIE Rock 72 year old male is on warfarin with therapeutic INR result. (Goal INR 2.0-3.0)    Recent labs: (last 7 days)     09/09/22  0928   INR 2.8*         Previous INR was Therapeutic    Medication, diet, health changes since last INR:chart reviewed; none identified    Contacted within the last 12 weeks by phone on 9/2/22      MINNIE Hutson was NOT contacted regarding therapeutic result today per home monitoring policy manage by exception agreement.   Current warfarin dose is to be continued:     Summary  As of 9/9/2022    Full warfarin instructions:  5 mg every Fri; 3.75 mg all other days   Next INR check:  9/16/2022           ?   Blanca Rodriguez RN  Anticoagulation Clinic  9/9/2022    _______________________________________________________________________     Anticoagulation Episode Summary     Current INR goal:  2.0-3.0   TTR:  84.5 % (11.9 mo)   Target end date:  Indefinite   Send INR reminders to:  CHELSEA CHILEL    Indications    Long-term (current) use of anticoagulants [Z79.01] [Z79.01]  Atrial fibrillation (H) [I48.91]  Antiphospholipid antibody syndrome (H) (Resolved) [D68.61]  Personal history of DVT (deep vein thrombosis) (Resolved) [Z86.718]  Atrial fibrillation  unspecified type (H) (Resolved) [I48.91]  Paroxysmal atrial fibrillation (H) [I48.0]  Chronic atrial fibrillation (H) [I48.20]  Personal history of PE (pulmonary embolism) [Z86.711]           Comments:  JESSICA rodas to manage by exception         Anticoagulation Care Providers     Provider Role Specialty Phone number    Anya Nowak MD Referring Family Medicine [phone]

## 2022-09-09 NOTE — TELEPHONE ENCOUNTER
Routing refill request to provider for review/approval because:  Failed Protocol    Chelsey Gaytan RN BSN  [facility]

## 2022-09-14 ENCOUNTER — TELEPHONE (OUTPATIENT)
Dept: FAMILY MEDICINE | Facility: CLINIC | Age: 72
End: 2022-09-14

## 2022-09-14 NOTE — TELEPHONE ENCOUNTER
Patient Quality Outreach    Patient is due for the following:   Diabetes -  LDL (Fasting), Eye Exam, Microalbumin and Foot Exam  Physical Annual Wellness Visit    Next Steps:   Schedule a Annual Wellness Visit    Type of outreach:    Sent [employer] message.      Questions for provider review:    None     Makeda Weiss, [facility]  Chart routed to Care Team.

## 2022-09-16 ENCOUNTER — DOCUMENTATION ONLY (OUTPATIENT)
Dept: ANTICOAGULATION | Facility: CLINIC | Age: 72
End: 2022-09-16

## 2022-09-16 ENCOUNTER — TRANSFERRED RECORDS (OUTPATIENT)
Dept: HEALTH INFORMATION MANAGEMENT | Facility: CLINIC | Age: 72
End: 2022-09-16

## 2022-09-16 ENCOUNTER — PATIENT OUTREACH (OUTPATIENT)
Dept: CARE COORDINATION | Facility: CLINIC | Age: 72
End: 2022-09-16

## 2022-09-16 DIAGNOSIS — I48.20 CHRONIC ATRIAL FIBRILLATION (H): ICD-10-CM

## 2022-09-16 DIAGNOSIS — Z86.711 PERSONAL HISTORY OF PE (PULMONARY EMBOLISM): ICD-10-CM

## 2022-09-16 DIAGNOSIS — Z79.01 LONG TERM CURRENT USE OF ANTICOAGULANT THERAPY: Primary | ICD-10-CM

## 2022-09-16 DIAGNOSIS — I48.0 PAROXYSMAL ATRIAL FIBRILLATION (H): ICD-10-CM

## 2022-09-16 LAB — INR (EXTERNAL): 2.3 (ref 0.9–1.1)

## 2022-09-16 NOTE — PROGRESS NOTES
ANTICOAGULATION  MANAGEMENT-Home Monitor Managed by Exception    Haresh EMILIE Rock 72 year old male is on warfarin with therapeutic INR result. (Goal INR 2.0-3.0)    Recent labs: (last 7 days)     09/16/22  1113   INR 2.3*         Previous INR was Therapeutic    Medication, diet, health changes since last INR:chart reviewed; none identified    Contacted within the last 12 weeks by phone on 9/2/22      MINNIE Hutson was NOT contacted regarding therapeutic result today per home monitoring policy manage by exception agreement.   Current warfarin dose is to be continued:     Summary  As of 9/16/2022    Full warfarin instructions:  5 mg every Fri; 3.75 mg all other days   Next INR check:  9/23/2022           ?   Allyn Doe RN  Anticoagulation Clinic  9/16/2022    _______________________________________________________________________     Anticoagulation Episode Summary     Current INR goal:  2.0-3.0   TTR:  86.3 % (11.9 mo)   Target end date:  Indefinite   Send INR reminders to:  CHELSEA [facility]ITOR[facility]    Indications    Long-term (current) use of anticoagulants [Z79.01] [Z79.01]  Atrial fibrillation (H) [I48.91]  Antiphospholipid antibody syndrome (H) (Resolved) [D68.61]  Personal history of DVT (deep vein thrombosis) (Resolved) [Z86.718]  Atrial fibrillation  unspecified type (H) (Resolved) [I48.91]  Paroxysmal atrial fibrillation (H) [I48.0]  Chronic atrial fibrillation (H) [I48.20]  Personal history of PE (pulmonary embolism) [Z86.711]           Comments:  JESSICA rodas to manage by exception         Anticoagulation Care Providers     Provider Role Specialty Phone number    Anya Nowak MD Referring Family Medicine [phone]

## 2022-09-16 NOTE — PROGRESS NOTES
Clinic Care Coordination Contact  Care Team Conversations    Care Coordination Clinician Chart Review     Situation: Patient chart reviewed by care coordinator.?     Background: Initial assessment and enrollment to Care Coordination was 11/5/21.?? Care plan(s) with patient-centered goal(s) were developed with participation from patient.? Lead CC handed patient off to CHW for continued outreach every 30 days.??     Assessment: Per chart review, patient outreach completed by CC CHW on 7/26/22 the CHW has not reached out since this encounter.? Patient is actively working to accomplish goal(s).? Patient's goal(s) remain(s) appropriate at this time.? Patient is not due for updated Plan of Care.? Annual assessment will be due 11/5/22.     Care Plan: General  Work on increasing strength and stamina for a year after reaching 100%     Problem: HP GENERAL PROBLEM     Goal: General  work on walking without the walker, and increasing my strength and stamina.  For at least 1 year after reaching 100%.     Start Date: 12/13/2021 Expected End Date: 6/13/2023    Note:     Barriers: suffers from parkinson's  Strengths: engaged in care coordination  Patient expressed understanding of goal: yes  Action steps to achieve this goal:  1. I will practice in the house walking without the walker. No longer using the walker as of 3/15/22. Completed action step  2. I will go outside without the walker when I feel strong enough and have increased my strength and stamina.  Continue to work on for at least a year.  3. I will frequently walk around inside the house to increase my strength and stamina.  Continue to work on for at least a year.                    Care Plan: General  Annual Wellness visit     Problem: HP GENERAL PROBLEM     Goal: General Goal - Make an appointment for an annual wellness visit.     Start Date: 8/5/2022 Expected End Date: 8/5/2023    Note:     Barriers: suffers from parkinsons  Strengths: engaged in care coordination    Patient expressed understanding of goal: yes  Action steps to achieve this goal:  1. I will schedule my annual wellness visit; [phone])  2. I will attend my annual wellness visit.  3. I will contact my Care Management or clinic team if I have barriers to attending my annual wellness visit.                         Plan/Recommendations: The patient will continue working with Care Coordination to achieve above goal(s).? CHW will involve Lead CC as needed or if patient is ready to move to maintenance.? Lead CC will continue to monitor CHW s monthly outreaches and progress to goal(s) every 6 weeks.    Plan of Care updated and sent to patient: No.    CHW to contact every 30 days and review the goals.        Uriel Zhou MSN, RN, PHN, CCM   Primary Care Clinical RN Care Coordinator  [facility]  9/16/2022   3:31 PM  [email or web address]  Office: [phone]

## 2022-09-20 DIAGNOSIS — I50.21 ACUTE SYSTOLIC (CONGESTIVE) HEART FAILURE (H): ICD-10-CM

## 2022-09-20 DIAGNOSIS — I48.0 PAROXYSMAL ATRIAL FIBRILLATION (H): ICD-10-CM

## 2022-09-20 DIAGNOSIS — I47.10 PAROXYSMAL SUPRAVENTRICULAR TACHYCARDIA (H): ICD-10-CM

## 2022-09-20 RX ORDER — FLECAINIDE ACETATE 50 MG/1
50 TABLET ORAL 2 TIMES DAILY
Qty: 180 TABLET | Refills: 1 | Status: SHIPPED | OUTPATIENT
Start: 2022-09-20 | End: 2023-03-29

## 2022-09-20 RX ORDER — ROSUVASTATIN CALCIUM 40 MG/1
40 TABLET, COATED ORAL AT BEDTIME
Qty: 90 TABLET | Refills: 0 | OUTPATIENT
Start: 2022-09-20

## 2022-09-20 NOTE — TELEPHONE ENCOUNTER
Patient would like prescription to go to [facility] phone [phone] could not find.  Requested Prescriptions   Pending Prescriptions Disp Refills     rosuvastatin (CRESTOR) 40 MG tablet 90 tablet 0     Sig: Take 1 tablet (40 mg) by mouth At Bedtime       There is no refill protocol information for this order          Last office visit: 6/8/2022 with prescribing provider:  Anya Nowak MD   Future Office Visit:   Next 5 appointments (look out 90 days)    Oct 04, 2022  9:30 AM  (Arrive by 9:15 AM)  Return Visit with Augusto Miller MD  [facility] [place] ([facility] ) [address] [place] [id number] [phone]       Zayra Crews   Pur[facility] Team

## 2022-09-20 NOTE — TELEPHONE ENCOUNTER
LVM for pt to return call to clinic scheduler to schedule virtual follow-up with Dr. Matt with an echocardiogram prior.    CHANTALE Quintero

## 2022-09-20 NOTE — TELEPHONE ENCOUNTER
Signed Prescriptions:                        Disp   Refills    flecainide (TAMBOCOR) 50 MG tablet         180 ta*1        Sig: Take 1 tablet (50 mg) by mouth 2 times daily  Authorizing Provider: JOEL MATA  Ordering User: TIFFANY MCKEON    Patient is due for an echo and follow-up with Dr. Mata.  Please call patient to schedule.    Tiffany Mckeon RN  Cardiology Care Coordinator  [facility]  [phone] option 1

## 2022-09-21 NOTE — TELEPHONE ENCOUNTER
Patient Quality Outreach    Patient is due for the following:   Diabetes -  LDL (Fasting), Eye Exam, Microalbumin and Foot Exam  Physical Annual Wellness Visit    Next Steps:   Schedule a Annual Wellness Visit. Physical scheduled for 10/11/22 with Nayana Hummel.    Type of outreach:    Chart review performed, no outreach needed.    Next Steps:  Reach out within 90 days via [employer]    Max number of attempts reached: Yes. Will try again in 90 days if patient still on fail list.    Questions for provider review:    None     Makeda Weiss CMA  Chart routed to Care Team.

## 2022-09-23 ENCOUNTER — DOCUMENTATION ONLY (OUTPATIENT)
Dept: ANTICOAGULATION | Facility: CLINIC | Age: 72
End: 2022-09-23

## 2022-09-23 DIAGNOSIS — Z79.01 LONG TERM CURRENT USE OF ANTICOAGULANT THERAPY: Primary | ICD-10-CM

## 2022-09-23 DIAGNOSIS — I48.20 CHRONIC ATRIAL FIBRILLATION (H): ICD-10-CM

## 2022-09-23 DIAGNOSIS — I48.91 ATRIAL FIBRILLATION (H): ICD-10-CM

## 2022-09-23 DIAGNOSIS — Z86.711 PERSONAL HISTORY OF PE (PULMONARY EMBOLISM): ICD-10-CM

## 2022-09-23 DIAGNOSIS — I48.0 PAROXYSMAL ATRIAL FIBRILLATION (H): ICD-10-CM

## 2022-09-23 LAB — INR (EXTERNAL): 2.6 (ref 0.9–1.1)

## 2022-09-23 NOTE — PROGRESS NOTES
ANTICOAGULATION  MANAGEMENT-Home Monitor Managed by Exception    Haresh EMILIE Rock 72 year old male is on warfarin with therapeutic INR result. (Goal INR 2.0-3.0)    Recent labs: (last 7 days)     09/23/22  0934   INR 2.6*         Previous INR was Therapeutic    Medication, diet, health changes since last INR:chart reviewed; none identified    Contacted within the last 12 weeks by phone on  9/2/22          MINNIE Hutson was NOT contacted regarding therapeutic result today per home monitoring policy manage by exception agreement.   Current warfarin dose is to be continued:     Summary  As of 9/23/2022    Full warfarin instructions:  5 mg every Fri; 3.75 mg all other days   Next INR check:  9/30/2022           ?   Blanca Rodriguez RN  Anticoagulation Clinic  9/23/2022    _______________________________________________________________________     Anticoagulation Episode Summary     Current INR goal:  2.0-3.0   TTR:  87.1 % (11.9 mo)   Target end date:  Indefinite   Send INR reminders to:  [facility]    Indications    Long-term (current) use of anticoagulants [Z79.01] [Z79.01]  Atrial fibrillation (H) [I48.91]  Antiphospholipid antibody syndrome (H) (Resolved) [D68.61]  Personal history of DVT (deep vein thrombosis) (Resolved) [Z86.718]  Atrial fibrillation  unspecified type (H) (Resolved) [I48.91]  Paroxysmal atrial fibrillation (H) [I48.0]  Chronic atrial fibrillation (H) [I48.20]  Personal history of PE (pulmonary embolism) [Z86.711]           Comments:  JESSICA rodas to manage by exception         Anticoagulation Care Providers     Provider Role Specialty Phone number    Anya Nowak MD Referring Family Medicine [phone]

## 2022-09-26 NOTE — TELEPHONE ENCOUNTER
Checked patient chart; patient returned call and scheduled echocardiogram and return telephone visit with Dr. Matt for October 2022.     AVNI QuinteroF

## 2022-09-30 ENCOUNTER — TRANSFERRED RECORDS (OUTPATIENT)
Dept: HEALTH INFORMATION MANAGEMENT | Facility: CLINIC | Age: 72
End: 2022-09-30

## 2022-09-30 ENCOUNTER — PATIENT OUTREACH (OUTPATIENT)
Dept: CARE COORDINATION | Facility: CLINIC | Age: 72
End: 2022-09-30

## 2022-09-30 ENCOUNTER — TELEPHONE (OUTPATIENT)
Dept: NEUROLOGY | Facility: CLINIC | Age: 72
End: 2022-09-30

## 2022-09-30 ENCOUNTER — DOCUMENTATION ONLY (OUTPATIENT)
Dept: ANTICOAGULATION | Facility: CLINIC | Age: 72
End: 2022-09-30

## 2022-09-30 DIAGNOSIS — I48.0 PAROXYSMAL ATRIAL FIBRILLATION (H): ICD-10-CM

## 2022-09-30 DIAGNOSIS — Z86.711 PERSONAL HISTORY OF PE (PULMONARY EMBOLISM): ICD-10-CM

## 2022-09-30 DIAGNOSIS — I48.20 CHRONIC ATRIAL FIBRILLATION (H): ICD-10-CM

## 2022-09-30 DIAGNOSIS — Z79.01 LONG TERM CURRENT USE OF ANTICOAGULANT THERAPY: Primary | ICD-10-CM

## 2022-09-30 LAB — INR (EXTERNAL): 2.9 (ref 2–3)

## 2022-09-30 NOTE — PROGRESS NOTES
ANTICOAGULATION  MANAGEMENT-Home Monitor Managed by Exception    Haresh EMILIE Rock 72 year old male is on warfarin with therapeutic INR result. (Goal INR 2.0-3.0)    Recent labs: (last 7 days)     09/30/22  1530   INR 2.9         Previous INR was Therapeutic    Medication, diet, health changes since last INR:chart reviewed; none identified    Contacted within the last 12 weeks by phone on 9/2      MINNIE     Haresh was NOT contacted regarding therapeutic result today per home monitoring policy manage by exception agreement.   Current warfarin dose is to be continued:     Summary  As of 9/30/2022    Full warfarin instructions:  5 mg every Fri; 3.75 mg all other days   Next INR check:  10/7/2022           ?   Belinda Lafleur, RN  Anticoagulation Clinic  9/30/2022    _______________________________________________________________________     Anticoagulation Episode Summary     Current INR goal:  2.0-3.0   TTR:  88.2 % (11.9 mo)   Target end date:  Indefinite   Send INR reminders to:  [facility]    Indications    Long-term (current) use of anticoagulants [Z79.01] [Z79.01]  Atrial fibrillation (H) [I48.91]  Antiphospholipid antibody syndrome (H) (Resolved) [D68.61]  Personal history of DVT (deep vein thrombosis) (Resolved) [Z86.718]  Atrial fibrillation  unspecified type (H) (Resolved) [I48.91]  Paroxysmal atrial fibrillation (H) [I48.0]  Chronic atrial fibrillation (H) [I48.20]  Personal history of PE (pulmonary embolism) [Z86.711]           Comments:  JESSICA rodas to manage by exception         Anticoagulation Care Providers     Provider Role Specialty Phone number    Anya Nowak MD Referring Family Medicine [phone]

## 2022-09-30 NOTE — PROGRESS NOTES
Clinic Care Coordination Contact  [facility]/Voicemail       Clinical Data: Care Coordinator Outreach  Outreach attempted x 1.  Left message on patient's voicemail with call back information and requested return call.  Plan: Care Coordinator will try to reach patient again in 10 business days.    KARTHIKEYAN Tello  [facility] Care Coordination  [facility]  [phone]

## 2022-09-30 NOTE — TELEPHONE ENCOUNTER
Sent [employer] (1st Attempt) for the patient to call back and schedule the following:    Appointment type: O  Provider: Dr. Hill  Return date: D  Specialty phone number: [phone]  Additional appointment(s) needed: none  Additonal Notes: Need to reschedule the appointment with Dr. Hill on 10/10/2022, he will not be in clinic.    Patient may reschedule with Dr. Jerry, Dr. Jha or Dr. Hill at the [facility].     First available appointment at  is 3/13/2023.    Unable to reach the patient by phone, sent a [employer] message.    [facility]   Neurology, NeuroSurgery, NeuroPsychology and Pain Management Specialties  [phone]

## 2022-10-03 ENCOUNTER — PATIENT OUTREACH (OUTPATIENT)
Dept: ONCOLOGY | Facility: CLINIC | Age: 72
End: 2022-10-03

## 2022-10-03 NOTE — TELEPHONE ENCOUNTER
Writer sent a Reschedule letter to the patient, to reschedule the patient with Dr. Hill on 10/10/2022. Dr. Hill will be out of Clinic.    Patient may see Dr. Jerry, Dr. Jha or Dr. Hill at the [facility].    First available appointment at [facility] is March 2023.    [facility]   Neurology, NeuroSurgery, NeuroPsychology and Pain Management Specialties  [phone]

## 2022-10-03 NOTE — PROGRESS NOTES
[facility]: Cancer Care Short Note                                                                                          Completed chart audit to assign Oncology Care Coordination enrollment status.      Bonnie Walls, RN, OCN   RN Care Coordinator   [facility]    .

## 2022-10-04 ENCOUNTER — PATIENT OUTREACH (OUTPATIENT)
Dept: ONCOLOGY | Facility: CLINIC | Age: 72
End: 2022-10-04

## 2022-10-04 ENCOUNTER — APPOINTMENT (OUTPATIENT)
Dept: LAB | Facility: CLINIC | Age: 72
End: 2022-10-04
Attending: INTERNAL MEDICINE
Payer: COMMERCIAL

## 2022-10-04 ENCOUNTER — OFFICE VISIT (OUTPATIENT)
Dept: TRANSPLANT | Facility: CLINIC | Age: 72
End: 2022-10-04
Attending: INTERNAL MEDICINE
Payer: COMMERCIAL

## 2022-10-04 VITALS
TEMPERATURE: 97.3 F | DIASTOLIC BLOOD PRESSURE: 62 MMHG | BODY MASS INDEX: 22.82 KG/M2 | RESPIRATION RATE: 18 BRPM | OXYGEN SATURATION: 99 % | SYSTOLIC BLOOD PRESSURE: 121 MMHG | HEART RATE: 69 BPM | WEIGHT: 163.6 LBS

## 2022-10-04 DIAGNOSIS — E83.110 HEREDITARY HEMOCHROMATOSIS (H): ICD-10-CM

## 2022-10-04 DIAGNOSIS — E55.9 VITAMIN D DEFICIENCY: ICD-10-CM

## 2022-10-04 DIAGNOSIS — Z79.01 LONG TERM CURRENT USE OF ANTICOAGULANT THERAPY: ICD-10-CM

## 2022-10-04 DIAGNOSIS — Z85.71 HISTORY OF HODGKIN'S LYMPHOMA: ICD-10-CM

## 2022-10-04 DIAGNOSIS — G20.A1 PARKINSON'S DISEASE (H): ICD-10-CM

## 2022-10-04 LAB
ALBUMIN SERPL BCG-MCNC: 4 G/DL (ref 3.5–5.2)
ALP SERPL-CCNC: 104 U/L (ref 40–129)
ALT SERPL W P-5'-P-CCNC: 14 U/L (ref 10–50)
ANION GAP SERPL CALCULATED.3IONS-SCNC: 9 MMOL/L (ref 7–15)
AST SERPL W P-5'-P-CCNC: 32 U/L (ref 10–50)
BASOPHILS # BLD AUTO: 0 10E3/UL (ref 0–0.2)
BASOPHILS NFR BLD AUTO: 0 %
BILIRUB SERPL-MCNC: 0.9 MG/DL
BUN SERPL-MCNC: 33 MG/DL (ref 8–23)
CALCIUM SERPL-MCNC: 9.3 MG/DL (ref 8.8–10.2)
CHLORIDE SERPL-SCNC: 97 MMOL/L (ref 98–107)
CREAT SERPL-MCNC: 1.04 MG/DL (ref 0.67–1.17)
CRP SERPL-MCNC: <3 MG/L
DEPRECATED HCO3 PLAS-SCNC: 29 MMOL/L (ref 22–29)
EOSINOPHIL # BLD AUTO: 0.1 10E3/UL (ref 0–0.7)
EOSINOPHIL NFR BLD AUTO: 1 %
ERYTHROCYTE [DISTWIDTH] IN BLOOD BY AUTOMATED COUNT: 12.3 % (ref 10–15)
ERYTHROCYTE [SEDIMENTATION RATE] IN BLOOD BY WESTERGREN METHOD: 53 MM/HR (ref 0–20)
FERRITIN SERPL-MCNC: 107 NG/ML (ref 31–409)
GFR SERPL CREATININE-BSD FRML MDRD: 75 ML/MIN/1.73M2
GLUCOSE SERPL-MCNC: 114 MG/DL (ref 70–99)
HCT VFR BLD AUTO: 44.5 % (ref 40–53)
HGB BLD-MCNC: 15.9 G/DL (ref 13.3–17.7)
IGG SERPL-MCNC: 1286 MG/DL (ref 610–1616)
IMM GRANULOCYTES # BLD: 0 10E3/UL
IMM GRANULOCYTES NFR BLD: 0 %
IRON BINDING CAPACITY (ROCHE): 258 UG/DL (ref 240–430)
IRON SATN MFR SERPL: 50 % (ref 15–46)
IRON SERPL-MCNC: 130 UG/DL (ref 61–157)
LDH SERPL L TO P-CCNC: 237 U/L (ref 0–250)
LYMPHOCYTES # BLD AUTO: 3.2 10E3/UL (ref 0.8–5.3)
LYMPHOCYTES NFR BLD AUTO: 40 %
MCH RBC QN AUTO: 33.8 PG (ref 26.5–33)
MCHC RBC AUTO-ENTMCNC: 35.7 G/DL (ref 31.5–36.5)
MCV RBC AUTO: 95 FL (ref 78–100)
MONOCYTES # BLD AUTO: 0.7 10E3/UL (ref 0–1.3)
MONOCYTES NFR BLD AUTO: 8 %
NEUTROPHILS # BLD AUTO: 3.9 10E3/UL (ref 1.6–8.3)
NEUTROPHILS NFR BLD AUTO: 51 %
NRBC # BLD AUTO: 0 10E3/UL
NRBC BLD AUTO-RTO: 0 /100
PLATELET # BLD AUTO: 88 10E3/UL (ref 150–450)
POTASSIUM SERPL-SCNC: 4.9 MMOL/L (ref 3.4–5.3)
PROT SERPL-MCNC: 7.5 G/DL (ref 6.4–8.3)
RBC # BLD AUTO: 4.71 10E6/UL (ref 4.4–5.9)
SODIUM SERPL-SCNC: 135 MMOL/L (ref 136–145)
TOTAL PROTEIN SERUM FOR ELP: 7 G/DL (ref 6.4–8.3)
VIT B12 SERPL-MCNC: 1505 PG/ML (ref 232–1245)
WBC # BLD AUTO: 7.8 10E3/UL (ref 4–11)

## 2022-10-04 PROCEDURE — 80053 COMPREHEN METABOLIC PANEL: CPT | Performed by: INTERNAL MEDICINE

## 2022-10-04 PROCEDURE — 86140 C-REACTIVE PROTEIN: CPT | Performed by: INTERNAL MEDICINE

## 2022-10-04 PROCEDURE — 83550 IRON BINDING TEST: CPT | Performed by: INTERNAL MEDICINE

## 2022-10-04 PROCEDURE — 83615 LACTATE (LD) (LDH) ENZYME: CPT | Performed by: INTERNAL MEDICINE

## 2022-10-04 PROCEDURE — 84165 PROTEIN E-PHORESIS SERUM: CPT | Mod: TC | Performed by: STUDENT IN AN ORGANIZED HEALTH CARE EDUCATION/TRAINING PROGRAM

## 2022-10-04 PROCEDURE — 99214 OFFICE O/P EST MOD 30 MIN: CPT | Performed by: INTERNAL MEDICINE

## 2022-10-04 PROCEDURE — 36415 COLL VENOUS BLD VENIPUNCTURE: CPT | Performed by: INTERNAL MEDICINE

## 2022-10-04 PROCEDURE — 84155 ASSAY OF PROTEIN SERUM: CPT | Performed by: INTERNAL MEDICINE

## 2022-10-04 PROCEDURE — 82784 ASSAY IGA/IGD/IGG/IGM EACH: CPT | Performed by: INTERNAL MEDICINE

## 2022-10-04 PROCEDURE — 85004 AUTOMATED DIFF WBC COUNT: CPT | Performed by: INTERNAL MEDICINE

## 2022-10-04 PROCEDURE — 85652 RBC SED RATE AUTOMATED: CPT | Performed by: INTERNAL MEDICINE

## 2022-10-04 PROCEDURE — G0463 HOSPITAL OUTPT CLINIC VISIT: HCPCS

## 2022-10-04 PROCEDURE — 82607 VITAMIN B-12: CPT | Performed by: INTERNAL MEDICINE

## 2022-10-04 PROCEDURE — 82728 ASSAY OF FERRITIN: CPT | Performed by: INTERNAL MEDICINE

## 2022-10-04 ASSESSMENT — PAIN SCALES - GENERAL: PAINLEVEL: NO PAIN (0)

## 2022-10-04 NOTE — PROGRESS NOTES
[facility]: Cancer Care                                                                                        RN met with pt, syeda Hutson. Introduced/reintroduced self and role of RN Care Coordinator at [facility]. Provided my contact information, [facility] phone number (which has options to talk with a Nurse available 24/7 - triage and RNCC via this option during business hours).   Discussed use of MyChart including to not use it for symptoms and time expectations.  Completed learning assessment with patient.  Pt denied any questions at end of conversation.    Signature:  Bonnie Walls, KOSTAS, OCN

## 2022-10-04 NOTE — LETTER
10/4/2022         RE: Haresh Rock  [address]  [place] [address]        Dear Colleague,    Thank you for referring your patient, Haresh Rock, to the [facility] BLOOD AND [facility]ARROW TRANSPLANT PROGRAM [place]. Please see a copy of my visit note below.    BONE MARROW TRANSPLANT VISIT      Haresh returns to the Bone Marrow Transplant Clinic for history of a JAK2 positive atypical myeloproliferative syndrome, status post nonmyeloablative allo-sib peripheral blood stem cell transplant in 2007, complicated by chronic sgwjk-mankhd-yofc disease, cirrhosis, hemochromatosis with C282Y homozygosity, pulmonary emboli and DVT as well as paroxysmal atrial fibrillation for which he is on warfarin, Hodgkin disease in 2011 s/p ABVD x 5 cycles, and a history of cardiac arrhythmias with an ablation. He was  diagnosed by Dr. Ángel Hill with Parkinson disease, with weight loss, dysphagia with aspiration, requiring G tube placement, orthostasis, urinary obstruction. Most recent CT and PET 2/26/21 were without evidence for recurrence of malignancy. Hypermetabolic activity at the anorectal junction with diffuse wall thickening was worked up by 3/17/21 flex sigmoidoscopy which showed a 4 mm polyp (removed, tubular adenoma), and erythematous mucosa that was biopsied and found superficial vascular congestion and melanosis coli without evidence for GVHD. Had full colonoscopy 4/14/21 without other findings. He had TURP with Dr. Hawley for urinary obstruction 8/9/21, as medical therapy was causing too much orthostasis.  Haresh was hospitalized in October and had a small bowel resection because of a G tube complication. G tube was replaced. He receives 2200 dee per day with tube feeding and every once in a while tries to eat but then aspirates. He can drink 1/2 cup Mountain due per day.    INTERVAL HISTORY  Rory feels fine Continues on Sinemet one tab 3x/day No bleeding Notes some trouble writing and signing. Had gene testing for  Parkinsonism but no gene mutation found Weight is stable Still doing tube feeding but takes pills in am  By mouth.Can drink some mountain Dew   Takes warfarin 3.75/d and 5mg on Fridays Not in A fib on flecainide    PAST MEDICAL HISTORY:  As above See 04/2022 note    SOCIAL HISTORY:  See my note from 04/2022     FAMILY HISTORY:  See my note from  04/2022     REVIEW OF SYSTEMS:  A 12-point review of systems is done and is negative, except as in the HPI.     PHYSICAL EXAMINATION: /62   Pulse 69   Temp 97.3  F (36.3  C) (Oral)   Resp 18   Wt 74.2 kg (163 lb 9.6 oz)   SpO2 99%   BMI 22.82 kg/m     Wt Readings from Last 10 Encounters:   10/04/22 74.2 kg (163 lb 9.6 oz)   07/25/22 73 kg (160 lb 14.4 oz)   06/08/22 72.1 kg (159 lb)   04/05/22 70.7 kg (155 lb 14.4 oz)   03/21/22 69.9 kg (154 lb)   01/12/22 67.6 kg (149 lb)   01/05/22 66.7 kg (147 lb)   12/07/21 66.8 kg (147 lb 3.2 oz)   12/01/21 63.7 kg (140 lb 8 oz)   11/10/21 61.7 kg (136 lb)     EYES:  Grossly normal to inspection, no discharge, erythema or icterus.   SKIN:   skin is clear.  No significant rash or abnormal pigmentation.   NEUROLOGIC:  Cranial nerves seem to be intact.  Mentation and speech is appropriate for age. minimal resting tremor.  voice is stable.No cogwheeling on flexing  Minimal resting tremor  PSYCHIATRIC:  Mentation appears normal.  He does not seem anxious today.   COR  1/6 SAYDA noted at LSB,   ABD No organomegaly G tube site ok  RESP Clear to P and A      ASSESSMENT:     1.  Myeloproliferative syndrome/MDS. STEVIE 2 positive  2.  Status post non-myeloablative allo-sib peripheral blood stem cell transplant 2007  3.  History of chronic dedfu-bnusgp-pmxe disease.   4.  History of Hodgkin's disease 2011 s/p ABVD x 5 cycles.   5.  History of cardiac arrhythmias, SVT and V tach.   6.  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   7.  History of cirrhosis.   8.  History of pulmonary emboli.   9.   History of elevated hemoglobin A1c.   10. Thyroid nodule.    11. Cholelithiasis.     12. Diverticulosis.   13. Anticoagulation with warfarin for history of DVT, PE, and paroxysmal atrial fibrillation  14. Status post bilateral knee replacement.     15. Aortic stenosis  16. 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  17. Seborrheic keratosis of the back.  18. Weight loss, dysphagia, anorexia related to Parkinson's  19. Parkinson's  20. 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 Chronic aspiration related to dysphagia,  22. Thrombocytopenia    ASSESSMENT:    Seems to be improved with weight gain from 136 in November to 163 today, with tube feeds. Still suspect he has chonic aspiration.He get a 5th COVID Moderna vaccine about a month ago. No evidence of Hodgkin's  at this time.Platelet count has been dropping over last couple years  Not sure if this  Is due to flecainide. Will monitor bleeding with him on warfarin.    RTC 6 months  With labs     I spent a total of 30  minutes face to face with Haresh Rock during today's office visit. Over 50% of this time was spent counseling the patient and coordinating the care regarding weight loss parkinson's and dysphagia            Again, thank you for allowing me to participate in the care of your patient.      Sincerely,    Augusto Miller MD

## 2022-10-04 NOTE — PROGRESS NOTES
BONE MARROW TRANSPLANT VISIT      Haresh returns to the Bone Marrow Transplant Clinic for history of a JAK2 positive atypical myeloproliferative syndrome, status post nonmyeloablative allo-sib peripheral blood stem cell transplant in 2007, complicated by chronic ciimy-ftwhxp-eimx disease, cirrhosis, hemochromatosis with C282Y homozygosity, pulmonary emboli and DVT as well as paroxysmal atrial fibrillation for which he is on warfarin, Hodgkin disease in 2011 s/p ABVD x 5 cycles, and a history of cardiac arrhythmias with an ablation. He was  diagnosed by Dr. Ángel Hill with Parkinson disease, with weight loss, dysphagia with aspiration, requiring G tube placement, orthostasis, urinary obstruction. Most recent CT and PET 2/26/21 were without evidence for recurrence of malignancy. Hypermetabolic activity at the anorectal junction with diffuse wall thickening was worked up by 3/17/21 flex sigmoidoscopy which showed a 4 mm polyp (removed, tubular adenoma), and erythematous mucosa that was biopsied and found superficial vascular congestion and melanosis coli without evidence for GVHD. Had full colonoscopy 4/14/21 without other findings. He had TURP with Dr. Hawley for urinary obstruction 8/9/21, as medical therapy was causing too much orthostasis.  Haresh was hospitalized in October and had a small bowel resection because of a G tube complication. G tube was replaced. He receives 2200 dee per day with tube feeding and every once in a while tries to eat but then aspirates. He can drink 1/2 cup Mountain due per day.    INTERVAL HISTORY  Rory feels fine Continues on Sinemet one tab 3x/day No bleeding Notes some trouble writing and signing. Had gene testing for Parkinsonism but no gene mutation found Weight is stable Still doing tube feeding but takes pills in am  By mouth.Can drink some mountain Dew   Takes warfarin 3.75/d and 5mg on Fridays Not in A fib on flecainide    PAST MEDICAL HISTORY:  As above See 04/2022 note    SOCIAL  HISTORY:  See my note from 04/2022     FAMILY HISTORY:  See my note from  04/2022     REVIEW OF SYSTEMS:  A 12-point review of systems is done and is negative, except as in the HPI.     PHYSICAL EXAMINATION: /62   Pulse 69   Temp 97.3  F (36.3  C) (Oral)   Resp 18   Wt 74.2 kg (163 lb 9.6 oz)   SpO2 99%   BMI 22.82 kg/m     Wt Readings from Last 10 Encounters:   10/04/22 74.2 kg (163 lb 9.6 oz)   07/25/22 73 kg (160 lb 14.4 oz)   06/08/22 72.1 kg (159 lb)   04/05/22 70.7 kg (155 lb 14.4 oz)   03/21/22 69.9 kg (154 lb)   01/12/22 67.6 kg (149 lb)   01/05/22 66.7 kg (147 lb)   12/07/21 66.8 kg (147 lb 3.2 oz)   12/01/21 63.7 kg (140 lb 8 oz)   11/10/21 61.7 kg (136 lb)     EYES:  Grossly normal to inspection, no discharge, erythema or icterus.   SKIN:   skin is clear.  No significant rash or abnormal pigmentation.   NEUROLOGIC:  Cranial nerves seem to be intact.  Mentation and speech is appropriate for age. minimal resting tremor.  voice is stable.No cogwheeling on flexing  Minimal resting tremor  PSYCHIATRIC:  Mentation appears normal.  He does not seem anxious today.   COR  1/6 SAYDA noted at LSB,   ABD No organomegaly G tube site ok  RESP Clear to P and A      ASSESSMENT:     1.  Myeloproliferative syndrome/MDS. STEVIE 2 positive  2.  Status post non-myeloablative allo-sib peripheral blood stem cell transplant 2007  3.  History of chronic owshm-jjcbgj-nwto disease.   4.  History of Hodgkin's disease 2011 s/p ABVD x 5 cycles.   5.  History of cardiac arrhythmias, SVT and V tach.   6.  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   7.  History of cirrhosis.   8.  History of pulmonary emboli.   9.  History of elevated hemoglobin A1c.   10. Thyroid nodule.    11. Cholelithiasis.     12. Diverticulosis.   13. Anticoagulation with warfarin for history of DVT, PE, and paroxysmal atrial fibrillation  14. Status post bilateral knee replacement.     15. Aortic stenosis  16.  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  17. Seborrheic keratosis of the back.  18. Weight loss, dysphagia, anorexia related to Parkinson's  19. Parkinson's  20. 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 Chronic aspiration related to dysphagia,  22. Thrombocytopenia    ASSESSMENT:    Seems to be improved with weight gain from 136 in November to 163 today, with tube feeds. Still suspect he has chonic aspiration.He get a 5th COVID Moderna vaccine about a month ago. No evidence of Hodgkin's  at this time.Platelet count has been dropping over last couple years  Not sure if this  Is due to flecainide. Will monitor bleeding with him on warfarin.    RTC 6 months  With labs     I spent a total of 30  minutes face to face with Haresh Rock during today's office visit. Over 50% of this time was spent counseling the patient and coordinating the care regarding weight loss parkinson's and dysphagia    Augusto Miller MD   [phone]

## 2022-10-04 NOTE — NURSING NOTE
"Oncology Rooming Note    October 4, 2022 9:13 AM   Haresh Rock is a 72 year old male who presents for:    Chief Complaint   Patient presents with     Blood Draw     Vpt blood draw by lab RN. Vitals taken and appointment arrived     Oncology Clinic Visit     Hereditary hemochromatosis     Initial Vitals: /62   Pulse 69   Temp 97.3  F (36.3  C) (Oral)   Resp 18   Wt 74.2 kg (163 lb 9.6 oz)   SpO2 99%   BMI 22.82 kg/m   Estimated body mass index is 22.82 kg/m  as calculated from the following:    Height as of 7/25/22: 1.803 m (5' 11\").    Weight as of this encounter: 74.2 kg (163 lb 9.6 oz). Body surface area is 1.93 meters squared.  No Pain (0) Comment: Data Unavailable   No LMP for male patient.  Allergies reviewed: Yes  Medications reviewed: Yes    Medications: Medication refills not needed today.  Pharmacy name entered into [employer]:    [id number] - [place], [place] - [address] [facility], [address]    Clinical concerns: No new concerns per pt.      Farrah Ardon CMA            "

## 2022-10-04 NOTE — NURSING NOTE
Chief Complaint   Patient presents with     Blood Draw     Vpt blood draw by lab RN. Vitals taken and appointment arrived     Elizabeth Wilburn RN

## 2022-10-05 LAB
ALBUMIN SERPL ELPH-MCNC: 3.7 G/DL (ref 3.7–5.1)
ALPHA1 GLOB SERPL ELPH-MCNC: 0.3 G/DL (ref 0.2–0.4)
ALPHA2 GLOB SERPL ELPH-MCNC: 0.7 G/DL (ref 0.5–0.9)
B-GLOBULIN SERPL ELPH-MCNC: 0.9 G/DL (ref 0.6–1)
GAMMA GLOB SERPL ELPH-MCNC: 1.4 G/DL (ref 0.7–1.6)
M PROTEIN SERPL ELPH-MCNC: 0 G/DL
PROT PATTERN SERPL ELPH-IMP: NORMAL

## 2022-10-05 PROCEDURE — 84165 PROTEIN E-PHORESIS SERUM: CPT | Mod: 26

## 2022-10-07 ENCOUNTER — DOCUMENTATION ONLY (OUTPATIENT)
Dept: ANTICOAGULATION | Facility: CLINIC | Age: 72
End: 2022-10-07

## 2022-10-07 ENCOUNTER — TRANSFERRED RECORDS (OUTPATIENT)
Dept: HEALTH INFORMATION MANAGEMENT | Facility: CLINIC | Age: 72
End: 2022-10-07

## 2022-10-07 DIAGNOSIS — Z79.01 LONG TERM CURRENT USE OF ANTICOAGULANT THERAPY: Primary | ICD-10-CM

## 2022-10-07 DIAGNOSIS — I48.91 ATRIAL FIBRILLATION (H): ICD-10-CM

## 2022-10-07 DIAGNOSIS — I48.0 PAROXYSMAL ATRIAL FIBRILLATION (H): ICD-10-CM

## 2022-10-07 DIAGNOSIS — I48.20 CHRONIC ATRIAL FIBRILLATION (H): ICD-10-CM

## 2022-10-07 DIAGNOSIS — Z86.711 PERSONAL HISTORY OF PE (PULMONARY EMBOLISM): ICD-10-CM

## 2022-10-07 LAB — INR (EXTERNAL): 2.7 (ref 0.9–1.1)

## 2022-10-07 ASSESSMENT — ACTIVITIES OF DAILY LIVING (ADL): CURRENT_FUNCTION: NO ASSISTANCE NEEDED

## 2022-10-07 ASSESSMENT — ENCOUNTER SYMPTOMS
CHILLS: 0
ABDOMINAL PAIN: 0
DYSURIA: 0
HEMATURIA: 0
MYALGIAS: 0
HEADACHES: 0
FREQUENCY: 1
JOINT SWELLING: 0
FEVER: 0
ARTHRALGIAS: 0
WEAKNESS: 0
HEARTBURN: 0
NERVOUS/ANXIOUS: 0
PALPITATIONS: 0
SHORTNESS OF BREATH: 1
HEMATOCHEZIA: 0
PARESTHESIAS: 0
SORE THROAT: 0
NAUSEA: 0
EYE PAIN: 0
DIZZINESS: 0
DIARRHEA: 0
CONSTIPATION: 0
COUGH: 0

## 2022-10-07 NOTE — PROGRESS NOTES
ANTICOAGULATION  MANAGEMENT-Home Monitor Managed by Exception    Haresh EMILIE Rock 72 year old male is on warfarin with therapeutic INR result. (Goal INR 2.0-3.0)    Recent labs: (last 7 days)     10/07/22  0857   INR 2.7*         Previous INR was Therapeutic    Medication, diet, health changes since last INR:chart reviewed; none identified    Contacted within the last 12 weeks by phone on 9/2          MINNIE     Haresh was NOT contacted regarding therapeutic result today per home monitoring policy manage by exception agreement.   Current warfarin dose is to be continued:     Summary  As of 10/7/2022    Full warfarin instructions:  5 mg every Fri; 3.75 mg all other days   Next INR check:  10/14/2022           ?   Blanca Rodriguez RN  Anticoagulation Clinic  10/7/2022    _______________________________________________________________________     Anticoagulation Episode Summary     Current INR goal:  2.0-3.0   TTR:  88.3 % (11.9 mo)   Target end date:  Indefinite   Send INR reminders to:  [facility]    Indications    Long-term (current) use of anticoagulants [Z79.01] [Z79.01]  Atrial fibrillation (H) [I48.91]  Antiphospholipid antibody syndrome (H) (Resolved) [D68.61]  Personal history of DVT (deep vein thrombosis) (Resolved) [Z86.718]  Atrial fibrillation  unspecified type (H) (Resolved) [I48.91]  Paroxysmal atrial fibrillation (H) [I48.0]  Chronic atrial fibrillation (H) [I48.20]  Personal history of PE (pulmonary embolism) [Z86.711]           Comments:  JESSICA rodas to manage by exception         Anticoagulation Care Providers     Provider Role Specialty Phone number    Anya Nowak MD Referring Family Medicine [phone]

## 2022-10-11 ENCOUNTER — OFFICE VISIT (OUTPATIENT)
Dept: FAMILY MEDICINE | Facility: CLINIC | Age: 72
End: 2022-10-11
Payer: COMMERCIAL

## 2022-10-11 VITALS
BODY MASS INDEX: 22.88 KG/M2 | DIASTOLIC BLOOD PRESSURE: 62 MMHG | HEIGHT: 71 IN | TEMPERATURE: 97.8 F | OXYGEN SATURATION: 97 % | SYSTOLIC BLOOD PRESSURE: 120 MMHG | HEART RATE: 64 BPM | WEIGHT: 163.4 LBS

## 2022-10-11 DIAGNOSIS — K92.0 HEMATEMESIS WITH NAUSEA: ICD-10-CM

## 2022-10-11 DIAGNOSIS — I50.21 ACUTE SYSTOLIC (CONGESTIVE) HEART FAILURE (H): ICD-10-CM

## 2022-10-11 DIAGNOSIS — N18.2 TYPE 2 DIABETES MELLITUS WITH STAGE 2 CHRONIC KIDNEY DISEASE, WITHOUT LONG-TERM CURRENT USE OF INSULIN (H): ICD-10-CM

## 2022-10-11 DIAGNOSIS — E11.22 TYPE 2 DIABETES MELLITUS WITH STAGE 2 CHRONIC KIDNEY DISEASE, WITHOUT LONG-TERM CURRENT USE OF INSULIN (H): ICD-10-CM

## 2022-10-11 DIAGNOSIS — Z23 NEED FOR PROPHYLACTIC VACCINATION AND INOCULATION AGAINST INFLUENZA: ICD-10-CM

## 2022-10-11 DIAGNOSIS — Z00.00 ENCOUNTER FOR MEDICARE ANNUAL WELLNESS EXAM: Primary | ICD-10-CM

## 2022-10-11 PROCEDURE — 36415 COLL VENOUS BLD VENIPUNCTURE: CPT | Performed by: PHYSICIAN ASSISTANT

## 2022-10-11 PROCEDURE — G0439 PPPS, SUBSEQ VISIT: HCPCS | Performed by: PHYSICIAN ASSISTANT

## 2022-10-11 PROCEDURE — 82043 UR ALBUMIN QUANTITATIVE: CPT | Performed by: PHYSICIAN ASSISTANT

## 2022-10-11 PROCEDURE — 80061 LIPID PANEL: CPT | Performed by: PHYSICIAN ASSISTANT

## 2022-10-11 PROCEDURE — G0008 ADMIN INFLUENZA VIRUS VAC: HCPCS | Performed by: PHYSICIAN ASSISTANT

## 2022-10-11 PROCEDURE — 90662 IIV NO PRSV INCREASED AG IM: CPT | Performed by: PHYSICIAN ASSISTANT

## 2022-10-11 RX ORDER — ROSUVASTATIN CALCIUM 40 MG/1
40 TABLET, COATED ORAL AT BEDTIME
Qty: 90 TABLET | Refills: 3 | Status: SHIPPED | OUTPATIENT
Start: 2022-10-11 | End: 2023-12-04

## 2022-10-11 ASSESSMENT — ENCOUNTER SYMPTOMS
NERVOUS/ANXIOUS: 0
CONSTIPATION: 0
HEMATOCHEZIA: 0
PALPITATIONS: 0
PARESTHESIAS: 0
HEADACHES: 0
HEARTBURN: 0
ARTHRALGIAS: 0
FREQUENCY: 1
ABDOMINAL PAIN: 0
JOINT SWELLING: 0
HEMATURIA: 0
EYE PAIN: 0
FEVER: 0
COUGH: 0
DIARRHEA: 0
MYALGIAS: 0
SHORTNESS OF BREATH: 1
DIZZINESS: 0
CHILLS: 0
WEAKNESS: 0
DYSURIA: 0
SORE THROAT: 0
NAUSEA: 0

## 2022-10-11 ASSESSMENT — ACTIVITIES OF DAILY LIVING (ADL): CURRENT_FUNCTION: NO ASSISTANCE NEEDED

## 2022-10-11 NOTE — PATIENT INSTRUCTIONS
I recommend that you get the shingles vaccine. The shingles vaccine is a 2 vaccine series that can be completed at any of the local pharmacies.        Ask at the pharmacy about a tetanus vaccine as well.       Patient Education   Personalized Prevention Plan  You are due for the preventive services outlined below.  Your care team is available to assist you in scheduling these services.  If you have already completed any of these items, please share that information with your care team to update in your medical record.  Health Maintenance Due   Topic Date Due    Heart Failure Action Plan  Never done    Zoster (Shingles) Vaccine (1 of 2) 11/14/2016    Diabetic Foot Exam  04/23/2020    Eye Exam  01/01/2022    Annual Wellness Visit  07/27/2022    Kidney Microalbumin Urine Test  07/27/2022    Cholesterol Lab  08/20/2022    Flu Vaccine (1) 09/01/2022    Diptheria Tetanus Pertussis (DTAP/TDAP/TD) Vaccine (3 - Td or Tdap) 09/11/2022       Exercise for a Healthier Heart  You may wonder how you can improve the health of your heart. If you re thinking about exercise, you re on the right track. You don t need to become an athlete. But you do need a certain amount of brisk exercise to help strengthen your heart. If you have been diagnosed with a heart condition, your healthcare provider may advise exercise to help stabilize your condition. To help make exercise a habit, choose safe, fun activities.      Exercise with a friend. When activity is fun, you're more likely to stick with it.   Before you start  Check with your healthcare provider before starting an exercise program. This is especially important if you have not been active for a while. It's also important if you have a long-term (chronic) health problem such as heart disease, diabetes, or obesity. Or if you are at high risk for having these problems.   Why exercise?  Exercising regularly offers many healthy rewards. It can help you do all of the following:   Improve  your blood cholesterol level to help prevent further heart trouble  Lower your blood pressure to help prevent a stroke or heart attack  Control diabetes, or reduce your risk of getting this disease  Improve your heart and lung function  Reach and stay at a healthy weight  Make your muscles stronger so you can stay active  Prevent falls and fractures by slowing the loss of bone mass (osteoporosis)  Manage stress better  Reduce your blood pressure  Improve your sense of self and your body image  Exercise tips    Ease into your routine. Set small goals. Then build on them. If you are not sure what your activity level should be, talk with your healthcare provider first before starting an exercise routine.  Exercise on most days. Aim for a total of 150 minutes (2 hours and 30 minutes) or more of moderate-intensity aerobic activity each week. Or 75 minutes (1 hour and 15 minutes) or more of vigorous-intensity aerobic activity each week. Or try for a combination of both. Moderate activity means that you breathe heavier and your heart rate increases but you can still talk. Think about doing 40 minutes of moderate exercise, 3 to 4 times a week. For best results, activity should last for about 40 minutes to lower blood pressure and cholesterol. It's OK to work up to the 40-minute period over time. Examples of moderate-intensity activity are walking 1 mile in 15 minutes. Or doing 30 to 45 minutes of yard work.  Step up your daily activity level.  Along with your exercise program, try being more active the whole day. Walk instead of drive. Or park further away so that you take more steps each day. Do more household tasks or yard work. You may not be able to meet the advised mount of physical activity. But doing some moderate- or vigorous-intensity aerobic activity can help reduce your risk for heart disease. Your healthcare provider can help you figure out what is best for you.  Choose 1 or more activities you enjoy.  Walking  is one of the easiest things you can do. You can also try swimming, riding a bike, dancing, or taking an exercise class.    When to call your healthcare provider  Call your healthcare provider if you have any of these:   Chest pain or feel dizzy or lightheaded  Burning, tightness, pressure, or heaviness in your chest, neck, shoulders, back, or arms  Abnormal shortness of breath  More joint or muscle pain  A very fast or irregular heartbeat (palpitations)  [employer] last reviewed this educational content on 7/1/2019 2000-2021 [employer]. All rights reserved. This information is not intended as a substitute for professional medical care. Always follow your healthcare professional's instructions.          Understanding USDA MyPlate  The USDA has guidelines to help you make healthy food choices. These are called MyPlate. MyPlate shows the food groups that make up healthy meals using the image of a place setting. Before you eat, think about the healthiest choices for what to put on your plate or in your cup or bowl. To learn more about building a healthy plate, visit www.choosemyplate.gov.    The food groups  Fruits. Any fruit or 100% fruit juice counts as part of the Fruit Group. Fruits may be fresh, canned, frozen, or dried, and may be whole, cut-up, or pureed. Make 1/2 of your plate fruits and vegetables.  Vegetables. Any vegetable or 100% vegetable juice counts as a member of the Vegetable Group. Vegetables may be fresh, frozen, canned, or dried. They can be served raw or cooked and may be whole, cut-up, or mashed. Make 1/2 of your plate fruits and vegetables.  Grains. All foods made from grains are part of the Grains Group. These include wheat, rice, oats, cornmeal, and barley. Grains are often used to make foods such as bread, pasta, oatmeal, cereal, tortillas, and grits. Grains should be no more than 1/4 of your plate. At least half of your grains should be whole grains.  Protein. This group  includes meat, poultry, seafood, beans and peas, eggs, processed soy products (such as tofu), nuts (including nut butters), and seeds. Make protein choices no more than 1/4 of your plate. Meat and poultry choices should be lean or low fat.  Dairy. The Dairy Group includes all fluid milk products and foods made from milk that contain calcium, such as yogurt and cheese. (Foods that have little calcium, such as cream, butter, and cream cheese, are not part of this group.) Most dairy choices should be low-fat or fat-free.  Oils. Oils aren't a food group, but they do contain essential nutrients. However it's important to watch your intake of oils. These are fats that are liquid at room temperature. They include canola, corn, olive, soybean, vegetable, and sunflower oil. Foods that are mainly oil include mayonnaise, certain salad dressings, and soft margarines. You likely already get your daily oil allowance from the foods you eat.  Things to limit  Eating healthy also means limiting these things in your diet:     Salt (sodium). Many processed foods have a lot of sodium. To keep sodium intake down, eat fresh vegetables, meats, poultry, and seafood when possible. Purchase low-sodium, reduced-sodium, or no-salt-added food products at the store. And don't add salt to your meals at home. Instead, season them with herbs and spices such as dill, oregano, cumin, and paprika. Or try adding flavor with lemon or lime zest and juice.  Saturated fat. Saturated fats are most often found in animal products such as beef, pork, and chicken. They are often solid at room temperature, such as butter. To reduce your saturated fat intake, choose leaner cuts of meat and poultry. And try healthier cooking methods such as grilling, broiling, roasting, or baking. For a simple lower-fat swap, use plain nonfat yogurt instead of mayonnaise when making potato salad or macaroni salad.  Added sugars. These are sugars added to foods. They are in foods  such as ice cream, candy, soda, fruit drinks, sports drinks, energy drinks, cookies, pastries, jams, and syrups. Cut down on added sugars by sharing sweet treats with a family member or friend. You can also choose fruit for dessert, and drink water or other unsweetened beverages.     [employer] last reviewed this educational content on 6/1/2020 2000-2021 [employer]. All rights reserved. This information is not intended as a substitute for professional medical care. Always follow your healthcare professional's instructions.

## 2022-10-11 NOTE — PROGRESS NOTES
"SUBJECTIVE:   Haresh is a 72 year old who presents for Preventive Visit.      Patient has been advised of split billing requirements and indicates understanding: Yes  Are you in the first 12 months of your Medicare coverage?  No    Healthy Habits:     In general, how would you rate your overall health?  Good    Frequency of exercise:  None    Do you usually eat at least 4 servings of fruit and vegetables a day, include whole grains    & fiber and avoid regularly eating high fat or \"junk\" foods?  No    Taking medications regularly:  Yes    Medication side effects:  None    Ability to successfully perform activities of daily living:  No assistance needed    Home Safety:  No safety concerns identified    Hearing Impairment:  No hearing concerns    In the past 6 months, have you been bothered by leaking of urine?  No    In general, how would you rate your overall mental or emotional health?  Excellent      PHQ-2 Total Score: 0    Additional concerns today:  No    Do you feel safe in your environment? Yes    Have you ever done Advance Care Planning? (For example, a Health Directive, POLST, or a discussion with a medical provider or your loved ones about your wishes): Yes, advance care planning is on file.       Fall risk  Fallen 2 or more times in the past year?: No  Any fall with injury in the past year?: No    Cognitive Screening   1) Repeat 3 items (Leader, Season, Table)    2) Clock draw: NORMAL  3) 3 item recall: Recalls 3 objects  Results: 0 items recalled: PROBABLE COGNITIVE IMPAIRMENT, **INFORM PROVIDER**    Mini-CogTM Copyright YEHUDA Ponce. Licensed by the author for use in [facility]; reprinted with permission (orville@.[facility]). All rights reserved.      Do you have sleep apnea, excessive snoring or daytime drowsiness?: yes    Reviewed and updated as needed this visit by clinical staff   Tobacco  Allergies  Meds  Problems  Med Hx  Surg Hx  Fam Hx  Soc   Hx        Reviewed and updated as needed this " visit by Provider   Tobacco  Allergies  Meds  Problems  Med Hx  Surg Hx  Fam Hx         Social History     Tobacco Use     Smoking status: Never     Smokeless tobacco: Never   Substance Use Topics     Alcohol use: No     If you drink alcohol do you typically have >3 drinks per day or >7 drinks per week? No    Alcohol Use 10/11/2022   Prescreen: >3 drinks/day or >7 drinks/week? -   Prescreen: >3 drinks/day or >7 drinks/week? No     Patient reports eye exam 2/2022 [facility].       Current providers sharing in care for this patient include:   Patient Care Team:  Anya Nowak MD as PCP - General (Family Practice)  Augusto Miller MD as MD (Hematology)  Jesusita Mejia PA-C as Physician Assistant  Pino Sharma MD as MD (Internal Medicine)  Fabi Jimenez MD as MD (INTERNAL MEDICINE - ENDOCRINOLOGY, DIABETES & METABOLISM)  Bekah Matt MD as MD (Cardiology)  Tina Mejia RN as Specialty Care Coordinator (Cardiology)  Anya Nowak MD as Assigned PCP  Bekah Matt MD as Assigned Heart and Vascular Provider  Ángel Hill MD as Assigned Neuroscience Provider  Rosalva Samuels MD as MD (Family Medicine)  Jose G Hawley MD as Assigned Surgical Provider  Bekah Matt MD as MD (Clinical Cardiac Electrophysiology)  Marcelo Jacques MD as Assigned Infectious Disease Provider  Uriel Van RN as Lead Care Coordinator (Primary Care - CC)  Lizbeth Chen RPH as Pharmacist (Pharmacist Ambulatory Care)  Mica Edwards, PhD LP as Assigned Behavioral Health Provider  Vahid Edward MD as Assigned Pulmonology Provider  Leslie Clifford [facility] as Pharmacist (Pharmacist)  Bonnie Walls RN as Specialty Care Coordinator (Hematology & Oncology)  Maye Edmondson as Community Health Worker (Primary Care - CC)  Reg Cantu MD as MD (Critical Care)  Lizbeth Chen RPH as Assigned MT Pharmacist    The following health  maintenance items are reviewed in Epic and correct as of today:  Health Maintenance   Topic Date Due     HF ACTION PLAN  Never done     ZOSTER IMMUNIZATION (1 of 2) 11/14/2016     DIABETIC FOOT EXAM  04/23/2020     EYE EXAM  01/01/2022     MEDICARE ANNUAL WELLNESS VISIT  07/27/2022     MICROALBUMIN  07/27/2022     LIPID  08/20/2022     INFLUENZA VACCINE (1) 09/01/2022     DTAP/TDAP/TD IMMUNIZATION (3 - Td or Tdap) 09/11/2022     CBC  01/04/2023     BMP  04/04/2023     ANNUAL REVIEW OF HM ORDERS  06/08/2023     A1C  08/25/2023     ALT  10/04/2023     CMP  10/04/2023     FALL RISK ASSESSMENT  10/11/2023     ADVANCE CARE PLANNING  10/11/2027     COLORECTAL CANCER SCREENING  04/14/2031     TSH W/FREE T4 REFLEX  Completed     HEPATITIS C SCREENING  Completed     PHQ-2 (once per calendar year)  Completed     Pneumococcal Vaccine: 65+ Years  Completed     URINALYSIS  Completed     AORTIC ANEURYSM SCREENING (SYSTEM ASSIGNED)  Completed     COVID-19 Vaccine  Completed     IPV IMMUNIZATION  Aged Out     MENINGITIS IMMUNIZATION  Aged Out     Labs reviewed in EPIC          Review of Systems   Constitutional: Negative for chills and fever.   HENT: Negative for congestion, ear pain, hearing loss and sore throat.    Eyes: Negative for pain and visual disturbance.   Respiratory: Positive for shortness of breath. Negative for cough.    Cardiovascular: Negative for chest pain, palpitations and peripheral edema.   Gastrointestinal: Negative for abdominal pain, constipation, diarrhea, heartburn, hematochezia and nausea.   Genitourinary: Positive for frequency, impotence and urgency. Negative for dysuria, genital sores, hematuria and penile discharge.   Musculoskeletal: Negative for arthralgias, joint swelling and myalgias.   Neurological: Negative for dizziness, weakness, headaches and paresthesias.   Psychiatric/Behavioral: Negative for mood changes. The patient is not nervous/anxious.          OBJECTIVE:   /62 (BP Location:  "Left arm, Patient Position: Chair, Cuff Size: Adult Regular)   Pulse 64   Temp 97.8  F (36.6  C) (Oral)   Ht 1.803 m (5' 11\")   Wt 74.1 kg (163 lb 6.4 oz)   SpO2 97%   BMI 22.79 kg/m   Estimated body mass index is 22.79 kg/m  as calculated from the following:    Height as of this encounter: 1.803 m (5' 11\").    Weight as of this encounter: 74.1 kg (163 lb 6.4 oz).  Physical Exam  GENERAL: frail, alert and no distress  EYES: Eyes grossly normal to inspection, PERRL and conjunctivae and sclerae normal  HENT: ear canals and TM's normal, nose and mouth without ulcers or lesions  NECK: no adenopathy, no asymmetry, masses, or scars and thyroid normal to palpation  RESP: lungs clear to auscultation - no rales, rhonchi or wheezes  CV: regular rate and rhythm, normal S1 S2, no S3 or S4, no murmur, click or rub, no peripheral edema and peripheral pulses strong  ABDOMEN: soft, nontender, no hepatosplenomegaly, no masses and bowel sounds normal  MS: no gross musculoskeletal defects noted, no edema  SKIN: no suspicious lesions or rashes  NEURO: Normal strength and tone, mentation intact and speech normal  PSYCH: mentation appears normal, affect normal/bright  Diabetic foot exam: normal DP and PT pulses, no trophic changes or ulcerative lesions and normal sensory exam        ASSESSMENT / PLAN:   (Z00.00) Encounter for Medicare annual wellness exam  (primary encounter diagnosis)  Comment:   Plan:     (E11.22,  N18.2) Type 2 diabetes mellitus with stage 2 chronic kidney disease, without long-term current use of insulin (H)  Comment:   Plan: Albumin Random Urine Quantitative with Creat         Ratio, Lipid panel reflex to direct LDL         Non-fasting        Due for lipids, but just had labs, will order for future draw. microalbumin done today.     (K92.0) Hematemesis with nausea  Comment:  Plan: omeprazole (PRILOSEC) 20 MG DR capsule        Uses daily. Tolerating. Refilled.     (I50.21) Acute systolic (congestive) heart " "failure (H)  Comment:   Plan: rosuvastatin (CRESTOR) 40 MG tablet        Cholesterol testing ordered for future lab drawn.     (Z23) Need for prophylactic vaccination and inoculation against influenza  Comment:   Plan: INFLUENZA, QUAD, HIGH DOSE, PF, 65YR + (FLUZONE        HD), ADMIN INFLUENZA (For MEDICARE Patients         ONLY) []                  COUNSELING:  Reviewed preventive health counseling, as reflected in patient instructions       Fall risk prevention    Estimated body mass index is 22.79 kg/m  as calculated from the following:    Height as of this encounter: 1.803 m (5' 11\").    Weight as of this encounter: 74.1 kg (163 lb 6.4 oz).        He reports that he has never smoked. He has never used smokeless tobacco.      Appropriate preventive services were discussed with this patient, including applicable screening as appropriate for cardiovascular disease, diabetes, osteopenia/osteoporosis, and glaucoma.  As appropriate for age/gender, discussed screening for colorectal cancer, prostate cancer, breast cancer, and cervical cancer. Checklist reviewing preventive services available has been given to the patient.    Reviewed patients plan of care and provided an AVS. The Basic Care Plan (routine screening as documented in Health Maintenance) for Haresh meets the Care Plan requirement. This Care Plan has been established and reviewed with the Patient.    Counseling Resources:  ATP IV Guidelines  Pooled Cohorts Equation Calculator  Breast Cancer Risk Calculator  Breast Cancer: Medication to Reduce Risk  FRAX Risk Assessment  ICSI Preventive Guidelines  Dietary Guidelines for Americans, 2010  USDA's MyPlate  ASA Prophylaxis  Lung CA Screening    [facility]    Identified Health Risks:  "

## 2022-10-12 LAB
CHOLEST SERPL-MCNC: 92 MG/DL
CREAT UR-MCNC: 131 MG/DL
FASTING STATUS PATIENT QL REPORTED: NO
HDLC SERPL-MCNC: 39 MG/DL
LDLC SERPL CALC-MCNC: 40 MG/DL
MICROALBUMIN UR-MCNC: 48 MG/L
MICROALBUMIN/CREAT UR: 36.64 MG/G CR (ref 0–17)
NONHDLC SERPL-MCNC: 53 MG/DL
TRIGL SERPL-MCNC: 66 MG/DL

## 2022-10-12 NOTE — RESULT ENCOUNTER NOTE
Haresh,     Your kidney function is improving compared to a year ago. No changes to medications or plans.   Nayana Hummel PA-C

## 2022-10-14 ENCOUNTER — ANTICOAGULATION THERAPY VISIT (OUTPATIENT)
Dept: ANTICOAGULATION | Facility: CLINIC | Age: 72
End: 2022-10-14

## 2022-10-14 ENCOUNTER — TRANSFERRED RECORDS (OUTPATIENT)
Dept: HEALTH INFORMATION MANAGEMENT | Facility: CLINIC | Age: 72
End: 2022-10-14

## 2022-10-14 DIAGNOSIS — Z86.711 PERSONAL HISTORY OF PE (PULMONARY EMBOLISM): ICD-10-CM

## 2022-10-14 DIAGNOSIS — I48.20 CHRONIC ATRIAL FIBRILLATION (H): ICD-10-CM

## 2022-10-14 DIAGNOSIS — Z79.01 LONG TERM CURRENT USE OF ANTICOAGULANT THERAPY: Primary | ICD-10-CM

## 2022-10-14 DIAGNOSIS — I48.0 PAROXYSMAL ATRIAL FIBRILLATION (H): ICD-10-CM

## 2022-10-14 LAB — INR (EXTERNAL): 3.4 (ref 0.9–1.1)

## 2022-10-14 NOTE — PROGRESS NOTES
ANTICOAGULATION MANAGEMENT     Haresh Rock 72 year old male is on warfarin with supratherapeutic INR result. (Goal INR 2.0-3.0)    Recent labs: (last 7 days)     10/14/22  0000   INR 3.4*       ASSESSMENT       Source(s): Chart Review and Patient/Caregiver Call       Warfarin doses taken: Warfarin taken as instructed    Diet: No new diet changes identified    New illness, injury, or hospitalization: No    Medication/supplement changes: None noted    Signs or symptoms of bleeding or clotting: No    Previous INR: Therapeutic last 2(+) visits    Additional findings: had flu vaccine on Tuesday or Wednesday       PLAN     Recommended plan for no diet, medication or health factor changes affecting INR     Dosing Instructions: Continue your current warfarin dose with next INR in 1 week       Summary  As of 10/14/2022    Full warfarin instructions:  10/14: 3.75 mg; Otherwise 5 mg every Fri; 3.75 mg all other days   Next INR check:  10/21/2022             Telephone call with Haresh who verbalizes understanding and agrees to plan    Patient to recheck with home meter    Education provided: Importance of therapeutic range, Importance of following up at instructed interval and Importance of taking warfarin as instructed    Plan made per [facility] anticoagulation protocol    Mayi Ayala, RN  Anticoagulation Clinic  10/14/2022    _______________________________________________________________________     Anticoagulation Episode Summary     Current INR goal:  2.0-3.0   TTR:  87.1 % (11.9 mo)   Target end date:  Indefinite   Send INR reminders to:  [facility]    Indications    Long-term (current) use of anticoagulants [Z79.01] [Z79.01]  Atrial fibrillation (H) [I48.91]  Antiphospholipid antibody syndrome (H) (Resolved) [D68.61]  Personal history of DVT (deep vein thrombosis) (Resolved) [Z86.718]  Atrial fibrillation  unspecified type (H) (Resolved) [I48.91]  Paroxysmal atrial fibrillation (H) [I48.0]  Chronic atrial  fibrillation (H) [I48.20]  Personal history of PE (pulmonary embolism) [Z86.711]           Comments:  JESSICA okay to manage by exception         Anticoagulation Care Providers     Provider Role Specialty Phone number    Anya Nowak MD Referring Family Medicine [phone]

## 2022-10-18 ENCOUNTER — ANCILLARY PROCEDURE (OUTPATIENT)
Dept: CARDIOLOGY | Facility: CLINIC | Age: 72
End: 2022-10-18
Attending: INTERNAL MEDICINE
Payer: COMMERCIAL

## 2022-10-18 DIAGNOSIS — I42.8 NONISCHEMIC CARDIOMYOPATHY (H): ICD-10-CM

## 2022-10-18 DIAGNOSIS — I48.0 PAROXYSMAL ATRIAL FIBRILLATION (H): ICD-10-CM

## 2022-10-18 LAB — LVEF ECHO: NORMAL

## 2022-10-18 PROCEDURE — 93306 TTE W/DOPPLER COMPLETE: CPT | Performed by: INTERNAL MEDICINE

## 2022-10-18 PROCEDURE — 99207 PR STATISTIC IV PUSH SINGLE INITIAL SUBSTANCE: CPT | Performed by: INTERNAL MEDICINE

## 2022-10-19 ENCOUNTER — PATIENT OUTREACH (OUTPATIENT)
Dept: CARE COORDINATION | Facility: CLINIC | Age: 72
End: 2022-10-19

## 2022-10-19 NOTE — PROGRESS NOTES
Community Health Worker Follow Up    Care Gaps:     Health Maintenance Due   Topic Date Due     HF ACTION PLAN  Never done     ZOSTER IMMUNIZATION (1 of 2) 11/14/2016     EYE EXAM  01/01/2022     DTAP/TDAP/TD IMMUNIZATION (3 - Td or Tdap) 09/11/2022       Reminded patient he is due for eye exam and is going to discuss needed immunizations with PCP at next visit.     Care Plan:   Care Plan: General  Work on increasing strength and stamina for a year after reaching 100%     Problem: HP GENERAL PROBLEM     Goal: General  work on walking without the walker, and increasing my strength and stamina.  For at least 1 year after reaching 100%.     Start Date: 12/13/2021 Expected End Date: 6/13/2023    This Visit's Progress: 40%    Note:     Barriers: suffers from parkinson's  Strengths: engaged in care coordination  Patient expressed understanding of goal: yes  Action steps to achieve this goal:  1. I will practice in the house walking without the walker. No longer using the walker as of 3/15/22. Completed action step  2. I will go outside without the walker when I feel strong enough and have increased my strength and stamina.  Continue to work on for at least a year.  3. I will frequently walk around inside the house to increase my strength and stamina.  Continue to work on for at least a year.                    Care Plan: General  Annual Wellness visit     Problem: HP GENERAL PROBLEM     Goal: General Goal - Make an appointment for an annual wellness visit.     Start Date: 8/5/2022 Expected End Date: 8/5/2023    Note:     Barriers: suffers from parkinsons  Strengths: engaged in care coordination   Patient expressed understanding of goal: yes  Action steps to achieve this goal:  1. I will schedule my annual wellness visit; [phone] ([phone])  2. I will attend my annual wellness visit.  3. I will contact my Care Management or clinic team if I have barriers to attending my annual wellness visit.                          Intervention and Education during outreach: Patient states that he has recently switched to mail order pharmacy and that is working well. He does still use local pharmacy for short term prescriptions.     He has had no issues with his feeding tube recently.     He is not using his walker for the past 6+ months and he has had no falls.     CHW Plan: CHW will continue to support patient with goals through routine scheduled outreach.     Next outreach due: 11/17/22

## 2022-10-21 ENCOUNTER — TRANSFERRED RECORDS (OUTPATIENT)
Dept: HEALTH INFORMATION MANAGEMENT | Facility: CLINIC | Age: 72
End: 2022-10-21

## 2022-10-21 ENCOUNTER — ANTICOAGULATION THERAPY VISIT (OUTPATIENT)
Dept: ANTICOAGULATION | Facility: CLINIC | Age: 72
End: 2022-10-21

## 2022-10-21 DIAGNOSIS — I48.0 PAROXYSMAL ATRIAL FIBRILLATION (H): ICD-10-CM

## 2022-10-21 DIAGNOSIS — Z86.711 PERSONAL HISTORY OF PE (PULMONARY EMBOLISM): ICD-10-CM

## 2022-10-21 DIAGNOSIS — I48.20 CHRONIC ATRIAL FIBRILLATION (H): ICD-10-CM

## 2022-10-21 DIAGNOSIS — Z79.01 LONG TERM CURRENT USE OF ANTICOAGULANT THERAPY: Primary | ICD-10-CM

## 2022-10-21 LAB — INR (EXTERNAL): 2.4 (ref 0.9–1.1)

## 2022-10-21 NOTE — PROGRESS NOTES
ANTICOAGULATION MANAGEMENT     Haresh Rock 72 year old male is on warfarin with therapeutic INR result. (Goal INR 2.0-3.0)    Recent labs: (last 7 days)     10/21/22  0858   INR 2.4*       ASSESSMENT       Source(s): Chart Review and Patient/Caregiver Call       Warfarin doses taken: Warfarin taken as instructed    Diet: No new diet changes identified    New illness, injury, or hospitalization: No    Medication/supplement changes: None noted    Signs or symptoms of bleeding or clotting: No    Previous INR: Supratherapeutic    Additional findings: None       PLAN     Recommended plan for no diet, medication or health factor changes affecting INR     Dosing Instructions: Continue your current warfarin dose with next INR in 1 week       Summary  As of 10/21/2022    Full warfarin instructions:  5 mg every Fri; 3.75 mg all other days; Starting 10/21/2022   Next INR check:  10/28/2022             Telephone call with Haresh who agrees to plan and repeated back plan correctly    Patient to recheck with home meter    Education provided:     Please call back if any changes to your diet, medications or how you've been taking warfarin    Symptom monitoring: monitoring for bleeding signs and symptoms and monitoring for clotting signs and symptoms    Plan made per [facility] anticoagulation protocol    Allyn Doe RN  Anticoagulation Clinic  10/21/2022    _______________________________________________________________________     Anticoagulation Episode Summary     Current INR goal:  2.0-3.0   TTR:  86.4 % (11.9 mo)   Target end date:  Indefinite   Send INR reminders to:  [facility]    Indications    Long-term (current) use of anticoagulants [Z79.01] [Z79.01]  Atrial fibrillation (H) [I48.91]  Antiphospholipid antibody syndrome (H) (Resolved) [D68.61]  Personal history of DVT (deep vein thrombosis) (Resolved) [Z86.718]  Atrial fibrillation  unspecified type (H) (Resolved) [I48.91]  Paroxysmal atrial fibrillation (H)  [I48.0]  Chronic atrial fibrillation (H) [I48.20]  Personal history of PE (pulmonary embolism) [Z86.711]           Comments:  JESSICA okay to manage by exception         Anticoagulation Care Providers     Provider Role Specialty Phone number    Anya Nowak MD Referring Family Medicine [phone]

## 2022-10-26 ENCOUNTER — MEDICAL CORRESPONDENCE (OUTPATIENT)
Dept: HEALTH INFORMATION MANAGEMENT | Facility: CLINIC | Age: 72
End: 2022-10-26

## 2022-10-28 ENCOUNTER — TRANSFERRED RECORDS (OUTPATIENT)
Dept: HEALTH INFORMATION MANAGEMENT | Facility: CLINIC | Age: 72
End: 2022-10-28

## 2022-10-28 ENCOUNTER — DOCUMENTATION ONLY (OUTPATIENT)
Dept: ANTICOAGULATION | Facility: CLINIC | Age: 72
End: 2022-10-28

## 2022-10-28 ENCOUNTER — PATIENT OUTREACH (OUTPATIENT)
Dept: CARE COORDINATION | Facility: CLINIC | Age: 72
End: 2022-10-28

## 2022-10-28 DIAGNOSIS — I48.20 CHRONIC ATRIAL FIBRILLATION (H): ICD-10-CM

## 2022-10-28 DIAGNOSIS — Z79.01 LONG TERM CURRENT USE OF ANTICOAGULANT THERAPY: Primary | ICD-10-CM

## 2022-10-28 DIAGNOSIS — Z86.711 PERSONAL HISTORY OF PE (PULMONARY EMBOLISM): ICD-10-CM

## 2022-10-28 DIAGNOSIS — I48.0 PAROXYSMAL ATRIAL FIBRILLATION (H): ICD-10-CM

## 2022-10-28 LAB — INR (EXTERNAL): 2.3 (ref 0.9–1.1)

## 2022-10-28 ASSESSMENT — ACTIVITIES OF DAILY LIVING (ADL): DEPENDENT_IADLS:: INDEPENDENT

## 2022-10-28 NOTE — PROGRESS NOTES
ANTICOAGULATION  MANAGEMENT-Home Monitor Managed by Exception    Haresh EMILIE Rock 72 year old male is on warfarin with therapeutic INR result. (Goal INR 2.0-3.0)    Recent labs: (last 7 days)     10/28/22  0923   INR 2.3*         Previous INR was Therapeutic    Medication, diet, health changes since last INR:chart reviewed; none identified    Contacted within the last 12 weeks by phone on 10/21/22      MINNIE Hutson was NOT contacted regarding therapeutic result today per home monitoring policy manage by exception agreement.   Current warfarin dose is to be continued:     Summary  As of 10/28/2022    Full warfarin instructions:  5 mg every Fri; 3.75 mg all other days; Starting 10/28/2022   Next INR check:  11/4/2022           ?   Macrina Preston RN  Anticoagulation Clinic  10/28/2022    _______________________________________________________________________     Anticoagulation Episode Summary     Current INR goal:  2.0-3.0   TTR:  87.8 % (11.9 mo)   Target end date:  Indefinite   Send INR reminders to:  [facility]    Indications    Long-term (current) use of anticoagulants [Z79.01] [Z79.01]  Atrial fibrillation (H) [I48.91]  Antiphospholipid antibody syndrome (H) (Resolved) [D68.61]  Personal history of DVT (deep vein thrombosis) (Resolved) [Z86.718]  Atrial fibrillation  unspecified type (H) (Resolved) [I48.91]  Paroxysmal atrial fibrillation (H) [I48.0]  Chronic atrial fibrillation (H) [I48.20]  Personal history of PE (pulmonary embolism) [Z86.711]           Comments:  JESSICA rodas to manage by exception         Anticoagulation Care Providers     Provider Role Specialty Phone number    Anya Nowak MD Referring Family Medicine [phone]

## 2022-10-28 NOTE — PROGRESS NOTES
Clinic Care Coordination Contact    Clinic Care Coordination Contact  OUTREACH    Referral Information:  Referral Source: IP Report    Primary Diagnosis: GI Disorders    Chief Complaint   Patient presents with     Clinic Care Coordination - Initial     And (6 week chart review)        Universal Utilization: The [facility] and the [facility].    Clinic Utilization  Difficulty keeping appointments:: No  Compliance Concerns: No  No-Show Concerns: No  No PCP office visit in Past Year: No  Utilization    Hospital Admissions  2             ED Visits  1             No Show Count (past year)  0                Current as of: 10/23/2022  2:53 PM              Clinical Concerns:  Current Medical Concerns:  The patient suffers from Parkinson's and is having trouble with balance.  The patient is working toward being able to walk around things in his way without losing his balance.  The patient states that he is outside raking leaves without losing his balance.  And he is working very hard on walking around things without having to go back to using his walker and/or cane.    Medication review was completed with the patient and marked as reviewed.  Health maintenance was reviewed and questions were answered with the patient.  The assessment for CKD was completed with the patient today as well as the social determinants of health and they were marked as reviewed.    Patient Active Problem List   Diagnosis     Hemochromatosis     Xjqdx-hmzswx-tqco disease, chronic (H)     Advanced directives, counseling/discussion     Polyneuropathy     Status post total knee replacements     Status post bone marrow transplant (H)     Hyperlipidemia with target LDL less than 100     Atrial fibrillation (H)     Chronic rhinitis     Gallstones without obstruction of gallbladder     Long-term (current) use of anticoagulants [Z79.01]     Thyroid nodule     Type 2 diabetes mellitus with stage 2 chronic kidney disease,  without long-term current use of insulin (H)     History of Hodgkin's lymphoma     History of irradiation, presenting hazards to health     Primary pulmonary hypertension (H)     Hereditary hemochromatosis (H)     Oropharyngeal dysphagia     Articulation disorder     Personal history of PE (pulmonary embolism)     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  Education Provided to patient: Reviewed the process of making goals with the patient.  Pain  Pain (GOAL):: No  Health Maintenance Reviewed: Due/Overdue   Health Maintenance Due   Topic Date Due     HF ACTION PLAN  Never done     ZOSTER IMMUNIZATION (1 of 2) 11/14/2016     EYE EXAM  01/01/2022     DTAP/TDAP/TD IMMUNIZATION (3 - Td or Tdap) 09/11/2022       Clinical Pathway: None    Medication Management:  Medication review status: Medications reviewed and no changes reported per patient.        Patient is knowledgeable on medications and is adherent.  No financial concerns reported at this time.  Medication review was completed with the patient and there are no questions or concerns at this time.       Functional Status:  Dependent ADLs:: Independent  Dependent IADLs:: Independent  Bed or wheelchair confined:: No  Mobility Status: Independent  Fallen 2 or more times in the past year?: No  Any fall with injury in the past year?: No    Living Situation:  Current living arrangement:: I live in a private home with family  Type of residence:: Private home - stairs    Lifestyle & Psychosocial Needs:    Social Determinants of Health     Tobacco Use: Low Risk      Smoking Tobacco Use: Never     Smokeless  Tobacco Use: Never     Passive Exposure: Not on file   Alcohol Use: Not on file   Financial Resource Strain: Not on file   Food Insecurity: Not on file   Transportation Needs: Not on file   Physical Activity: Not on file   Stress: Not on file   Social Connections: Not on file   Intimate Partner Violence: Not on file   Depression: Not at risk     PHQ-2 Score: 0   Housing Stability: Not on file     Diet:: Diabetic diet  Inadequate nutrition (GOAL):: Yes  Tube Feeding: Yes  Tube Feeding: G-tube  Inadequate activity/exercise (GOAL):: No  Significant changes in sleep pattern (GOAL): No  Transportation means:: Regular car     Baptism or spiritual beliefs that impact treatment:: No  Mental health DX:: No  Mental health management concern (GOAL):: No  Informal Support system:: Spouse, Family             Resources and Interventions:  Current Resources:      Community Resources: Home Infusion  Supplies Currently Used at Home: Enteral Nutrition & Supplies  Equipment Currently Used at Home: glucometer  Employment Status: retired         Advance Care Plan/Directive  Advanced Care Plans/Directives on file:: Yes  Type Advanced Care Plans/Directives: Advanced Directive - On File    Referrals Placed: None         Care Plan:  Care Plan: General  Work on increasing strength and stamina for a year after reaching 100%     Problem: HP GENERAL PROBLEM     Goal: General  work on walking without the walker, and increasing my strength and stamina.  For at least 1 year after reaching 100%.     Start Date: 12/13/2021 Expected End Date: 6/13/2023    This Visit's Progress: 40%    Note:     Barriers: suffers from parkinson's  Strengths: engaged in care coordination  Patient expressed understanding of goal: yes  Action steps to achieve this goal:  1. I will practice in the house walking without the walker. No longer using the walker as of 3/15/22. Completed action step  2. I will go outside without the walker when I feel strong enough and have  increased my strength and stamina.  Continue to work on for at least a year.  3. I will frequently walk around inside the house to increase my strength and stamina.  Continue to work on for at least a year.                    Care Plan: General  Annual Wellness visit     Problem: HP GENERAL PROBLEM     Goal: General Goal - Make an appointment for an annual wellness visit.     Start Date: 8/5/2022 Expected End Date: 8/5/2023    Note:     Barriers: suffers from parkinsons  Strengths: engaged in care coordination   Patient expressed understanding of goal: yes  Action steps to achieve this goal:  1. I will schedule my annual wellness visit; [phone] ([phone])  2. I will attend my annual wellness visit.  3. I will contact my Care Management or clinic team if I have barriers to attending my annual wellness visit.                     Care Plan: General           Patient/Caregiver understanding: The patient has a very good understanding of the disease process.    Outreach Frequency: monthly  Future Appointments              In 3 days Bekah Matt MD [facility] PSA CLIN    In 2 months Ángel Hill MD [facility] Neurology Clinic [place]    In 5 months [facility] LAB DRAW [facility]    In 5 months Augusto Miller MD [facility] Blood and Marrow Transplant Program [facility]          Plan: 1.  The patient will work on filling his care gaps with his providers both eye doctor and PCP.  2.  The patient will attend all follow-up appointments that have been made.  3.  The patient will take all medications as prescribed by the providers.          Uriel Zhou MSN, RN, PHN, [facility]   Primary Care Clinical RN Care Coordinator  [facility]  10/28/2022   2:18 PM  [email or web address]  Office: [phone]

## 2022-10-28 NOTE — LETTER
[facility]  Patient Centered Plan of Care  About Me:        Patient Name:  Haresh Granados    YOB: 1950  Age:         72 year old   Radha MRN:    [id number] Telephone Information:  Home Phone [phone]   Mobile [phone]       Address:  [address] [place] [address] Email address:  [email or web address]      Emergency Contact(s)    Name Relationship Lgl Grd Work Phone Home Phone Mobile Phone   1. JANICE GRANADOS Spouse No   [phone]   2. DARWIN, LAY Daughter No   [phone]   3. SOL GRANADOS Son No   [phone]           Primary language:  English     needed? No   [place] [employer]:  [phone] op. 1  Other communication barriers:Glasses; Language barrier    Preferred Method of Communication:  Mail  Current living arrangement: I live in a private home with family    Mobility Status/ Medical Equipment: Independent        Health Maintenance  Health Maintenance Reviewed: Due/Overdue   Health Maintenance Due   Topic Date Due     HF ACTION PLAN  Never done     ZOSTER IMMUNIZATION (1 of 2) 11/14/2016     EYE EXAM  01/01/2022     DTAP/TDAP/TD IMMUNIZATION (3 - Td or Tdap) 09/11/2022           My Access Plan  Medical Emergency 911   Primary Clinic Line [facility] - [phone]   24 Hour Appointment Line [phone] or  [phone]) (toll-free)   24 Hour Nurse Line [phone] (toll-free)   Preferred Urgent Care No data recorded   Preferred Hospital [facility]  [phone]     Preferred Pharmacy [id number] [place] - [address] [facility] [address]     Behavioral Health Crisis Line The National Suicide Prevention Lifeline at 1-875.365.7731 or Text/Call 728             My Care Team Members  Patient Care Team       Relationship Specialty Notifications Start End    Anya Nowak MD PCP - General Family Practice  4/23/19     Phone: [phone] Fax: [phone]          [address] [facility] [facility] [address]    Augusto Miller MD MD Hematology Admissions 9/30/11     Referred to Endocrinology    Phone: [phone] Fax: [phone]         [address] [facility] [address]    Jesusita Mejia PA-C Physician Assistant   3/21/12     Phone: [phone] Fax: [phone]         [address] [facility] [address] [facility] [address]    Pino Sharma MD MD Internal Medicine  4/11/16     Phone: [phone] Fax: [phone]         [address] [facility] [address] [facility] [address]    Fabi Jimenez MD MD INTERNAL MEDICINE - ENDOCRINOLOGY, DIABETES & METABOLISM  4/18/16     Phone: [phone] Fax: [phone]         [address] [facility] [address] [facility] [address]    Bekah Matt MD MD Cardiology  8/12/16     Phone: [phone] Fax: [phone]         [address] [place] [address] [address] [facility] [address]    Tina Mejia RN Specialty Care Coordinator Cardiology  3/6/19     Phone: [phone] Fax: [phone]         [address] [facility] [address]    Anya Nowak MD Assigned PCP   4/28/19     Phone: [phone] Fax: [phone] [address] [facility] [facility] [address]    Bekah Matt MD Assigned Heart and Vascular Provider   10/23/20     Phone: [phone] Fax: [phone]         [address] [facility] [address] [place] [address]    Ángel Hill MD Assigned Neuroscience Provider   11/15/20     Phone: [phone] Fax: [phone]         [address] [place] [address]    Rosalva Samuels MD MD Family Medicine  6/1/21     Phone: [phone] Fax: [phone] [address] [facility] [address]    Jose G Hawley MD Assigned Surgical Provider   8/1/21     Phone: [phone] Fax: [phone]         [address] [place] [address] [facility] [address]    Bekah Matt MD MD Clinical Cardiac Electrophysiology  9/10/21     Phone: [phone] Fax: [phone]         [address] [facility] [address] [facility] [address]    Sawyer  Marcelo CHEN MD Assigned Infectious Disease Provider   9/12/21     Phone: [phone] Fax: [phone]         [address] [facility] [address]    Uriel Van, RN Lead Care Coordinator Primary Care - CC Admissions 11/5/21     Phone: [phone] Fax: [phone]        Lizbeth Chen [facility] Pharmacist Pharmacist Ambulatory Care  11/12/21     Phone: [phone] [address] [facility] [address]    Mica Edwards, PhD LP Assigned Behavioral Health Provider   11/21/21     Phone: [phone] Fax: [phone]         [address] [facility] [address]    Vahid Edward MD Assigned Pulmonology Provider   12/12/21     Phone: [phone] Fax: [phone]         [address] [place] [address]    Leslie Clifford [facility] Pharmacist Pharmacist  12/13/21     Phone: [phone] Pager: [phone]         [address] [facility] [address]    Bonnie Walls RN Specialty Care Coordinator Hematology & Oncology  12/16/21     Phone: [phone] Pager: [phone]        Myae Edmondson [facility] Health Worker Primary Care - CC Admissions 4/18/22     Phone: [phone] Fax: [phone]        Reg Cantu MD MD Critical Care  6/9/22     Phone: [phone] Fax: [phone]         [address] [place] [address]    Lizbeth Chen [facility] Assigned MTM Pharmacist   9/28/22     Phone: [phone] [address] [facility] [address]            My Care Plans  Self Management and Treatment Plan  Care Plan  Care Plan: General  Work on increasing strength and stamina for a year after reaching 100%     Problem: HP GENERAL PROBLEM     Goal: General  work on walking without the walker, and increasing my strength and stamina.  For at least 1 year after reaching 100%.     Start Date: 12/13/2021 Expected End Date: 6/13/2023    This Visit's Progress: 40%    Note:     Barriers: suffers from parkinson's  Strengths: engaged in care  coordination  Patient expressed understanding of goal: yes  Action steps to achieve this goal:  1. I will practice in the house walking without the walker. No longer using the walker as of 3/15/22. Completed action step  2. I will go outside without the walker when I feel strong enough and have increased my strength and stamina.  Continue to work on for at least a year.  3. I will frequently walk around inside the house to increase my strength and stamina.  Continue to work on for at least a year.                    Care Plan: General  Annual Wellness visit     Problem: HP GENERAL PROBLEM     Goal: General Goal - Make an appointment for an annual wellness visit.     Start Date: 8/5/2022 Expected End Date: 8/5/2023    Note:     Barriers: suffers from parkinsons  Strengths: engaged in care coordination   Patient expressed understanding of goal: yes  Action steps to achieve this goal:  1. I will schedule my annual wellness visit; [phone] ([phone])  2. I will attend my annual wellness visit.  3. I will contact my Care Management or clinic team if I have barriers to attending my annual wellness visit.                          Action Plans on File:                       Advance Care Plans/Directives Type:   Advanced Directive - On File      My Medical and Care Information  Problem List   Patient Active Problem List   Diagnosis     Hemochromatosis     Diprf-ltdyxq-gzoo disease, chronic (H)     Advanced directives, counseling/discussion     Polyneuropathy     Status post total knee replacements     Status post bone marrow transplant (H)     Hyperlipidemia with target LDL less than 100     Atrial fibrillation (H)     Chronic rhinitis     Gallstones without obstruction of gallbladder     Long-term (current) use of anticoagulants [Z79.01]     Thyroid nodule     Type 2 diabetes mellitus with stage 2 chronic kidney disease, without long-term current use of insulin (H)     History of Hodgkin's lymphoma     History of  irradiation, presenting hazards to health     Primary pulmonary hypertension (H)     Hereditary hemochromatosis (H)     Oropharyngeal dysphagia     Articulation disorder     Personal history of PE (pulmonary embolism)     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    Care Coordination Start Date: 11/5/2021   Frequency of Care Coordination: monthly     Form Last Updated: 10/28/2022

## 2022-10-31 ENCOUNTER — MYC MEDICAL ADVICE (OUTPATIENT)
Dept: FAMILY MEDICINE | Facility: CLINIC | Age: 72
End: 2022-10-31

## 2022-10-31 ENCOUNTER — VIRTUAL VISIT (OUTPATIENT)
Dept: CARDIOLOGY | Facility: CLINIC | Age: 72
End: 2022-10-31
Payer: COMMERCIAL

## 2022-10-31 DIAGNOSIS — I48.0 PAROXYSMAL ATRIAL FIBRILLATION (H): Primary | ICD-10-CM

## 2022-10-31 DIAGNOSIS — I42.8 NONISCHEMIC CARDIOMYOPATHY (H): ICD-10-CM

## 2022-10-31 PROCEDURE — 99213 OFFICE O/P EST LOW 20 MIN: CPT | Mod: 95 | Performed by: INTERNAL MEDICINE

## 2022-10-31 NOTE — PROGRESS NOTES
Video-Visit Details     Video Start Time: 11:22 AM     Type of service:  Video Visit     Video End Time: 11:30 AM     Originating Location (pt. Location): Home          Distant Location (provider location):  Off-site      Blood Pressure: 104/61 mm/Hg  Pulse: unknown bpm  Weight: 162 lbs  Height: 5 ft 11 in      Nayana Marks CMA ([facility])    HPI: Purpose of visit follow-up of atrial fibrillation    Haresh Rock is a 72 year old male with history of atrial fibrillation (on metoprolol, flecainide, and Coumadin), history of steroid-induced type 2 diabetes (resolved), dyslipidemia,  myeloproliferative disorder status post bone marrow transplant, and Parkinson's disease.    Patient's last visit with me was in October 2021.  Shortly after that visit patient had a feeding tube placed in his stomach because of the inability to swallow due to Parkinson's disease.  This has fortunately reduced the frequency of aspiration pneumonia events and also facilitated feeding and gaining weight.    Patient has been doing well from an atrial fibrillation standpoint.  There is no known recurrent atrial fibrillation episodes.  Patient did not report any symptoms of prolonged palpitations, exertional dyspnea, exertional angina, frequent lightheadedness, presyncope or syncope.      PAST MEDICAL HISTORY:  Past Medical History:   Diagnosis Date     Abnormal dopamine scan (DaTSCAN) 2020 11/04/2020    [phone] 11/3/2020   Narrative & Impression   Examination: Nuclear medicine DATscan for Dopamine Receptor Localization.   Examination: NM Brain Image Tomographic (SPECT) DATscan   Date: 11/3/2020 3:21 PM   Indication: Parkinsonism   Comparison: None   Additional Information: none   Interfering Medications: None   Technique:   The patient initially received 1 ml of Lugol's solution orally prior     Antiphospholipid antibody syndrome (H)     Ravi's, noted negative per testing re-done in 2008 in hematology note 01/13/2009     Articulation disorder  09/23/2020     Atrial fibrillation (H) 04/2011     Benign prostatic hyperplasia with urinary retention      Cirrhosis (H)      CKD (chronic kidney disease) stage 2, GFR 60-89 ml/min      Diabetes mellitus type II     steroid induced     DJD (degenerative joint disease) of knee     SHAWN, worse on right     DVT (deep venous thrombosis) (H)      ED (erectile dysfunction)      Gallbladder polyp 05/2010     Tpufb-Dbdxnp-Skml Disease 2007    BMT     Hemochromatosis      Hodgkin's disease NOS     5 cycles of ABVD in 2011     HTN (hypertension)      Hyperlipidaemia LDL goal <100      Multiple thyroid nodules     benign      Myeloproliferative disorder (H)     atypical, [facility]     Parkinson disease (H) 09/24/2020     PE (pulmonary embolism) 11/2004     Polyneuropathy      Pressure ulcer      Primary pulmonary hypertension (H)      Severe protein-calorie malnutrition (H) 11/10/2021     Small bowel obstruction (H) 10/29/2021     Thrombocytopenia (H) 06/08/2021       CURRENT MEDICATIONS:  Current Outpatient Medications   Medication Sig Dispense Refill     amoxicillin (AMOXIL) 500 MG capsule Take 4 capsules (2,000 mg) by mouth once Take 1 hour prior to dental procedure       carbidopa-levodopa (SINEMET)  MG tablet 1 tab 3/day at 8am, 2pm and 8pm 270 tablet 3     flecainide (TAMBOCOR) 50 MG tablet Take 1 tablet (50 mg) by mouth 2 times daily 180 tablet 1     loratadine (CLARITIN REDITABS) 10 MG ODT Take 10 mg by mouth daily       Metoprolol Succinate (KAPSPARGO) 25 MG CS24 1 capsule by Tube route daily . Open capsule and add contents to an all plastic oral tip syringe; add 15 mL of water. Gently shake the syringe for ~10 seconds. Immediately deliver mixture through tube. No granules should remain in the syringe; rinse syringe with additional water if necessary. 90 capsule 3     NONFORMULARY Abbott osmolyte feedings 2 cans 3/day       omeprazole (PRILOSEC) 20 MG DR capsule Take 1 capsule (20 mg) by mouth every morning 90  capsule 3     PREVIDENT 5000 DRY MOUTH 1.1 % GEL topical gel Apply to affected area daily   3     rosuvastatin (CRESTOR) 40 MG tablet Take 1 tablet (40 mg) by mouth At Bedtime 90 tablet 3     vitamin D3 (CHOLECALCIFEROL) 50 mcg (2000 units) tablet Take 1 tablet (50 mcg) by mouth daily       warfarin ANTICOAGULANT (COUMADIN) 2.5 MG tablet Take 5mg Friday & 3.75mg all the other days or as directed by [facility] clinic 180 tablet 1       PAST SURGICAL HISTORY:  Past Surgical History:   Procedure Laterality Date     ANESTHESIA CARDIOVERSION  04/21/2011    Procedure:ANESTHESIA CARDIOVERSION; Surgeon:GENERIC ANESTHESIA PROVIDER; Location:[facility]     ARTHROSCOPY KNEE RT/LT      LT     CATARACT IOL, RT/LT  2017     COLONOSCOPY  10/16/2012    Procedure: COLONOSCOPY;  Colonoscopy, screening;  Surgeon: Reg Feliciano MD;  Location: [facility]     COLONOSCOPY N/A 04/14/2021    Procedure: COLONOSCOPY;  Surgeon: Reg Holm MD;  Location: [facility]     ESOPHAGOSCOPY, GASTROSCOPY, DUODENOSCOPY (EGD), COMBINED N/A 10/07/2020    Procedure: ESOPHAGOGASTRODUODENOSCOPY, WITH BIOPSY;  Surgeon: Raul Sawyer DO;  Location: [facility] OR     H ABLATION FOCAL AFIB  03/05/2013    PVI     H ABLATION FOCAL AFIB  01/01/2018     HC BONE MARROW/STEM TRANSPLANT ALLOGENIC  05/08/2007     INSERT PORT VASCULAR ACCESS       IR GASTROSTOMY TUBE PERCUTANEOUS PLCMNT  10/25/2021     JOINT REPLACEMTN, KNEE RT/LT  03/28/2012    SHAWN     LAPAROSCOPY DIAGNOSTIC (GENERAL) N/A 10/29/2021    Procedure: LAPAROSCOPY, DIAGNOSTIC, TAKEDOWN OF MALPOSITIONED GASTROSTOMY TUBE, INSERTION OF GASTROSTOMY TUBE, SMALL BOWEL RESECTION X1, PRIMARY REPAIR OF SMALL BOWEL ENTEROTOMY, ABDOMINAL WASHOUT, TAP BLOCK;  Surgeon: Leif Finney DO;  Location: [facility] OR     PEG TUBE PLACEMENT  10/25/2021     VASECTOMY  1990       ALLERGIES:     Allergies   Allergen Reactions     Seasonal Allergies        FAMILY HISTORY:  - Premature coronary artery disease  - Atrial fibrillation  -  Sudden cardiac death     SOCIAL HISTORY:  Social History     Tobacco Use     Smoking status: Never     Smokeless tobacco: Never   Substance Use Topics     Alcohol use: No     Drug use: No       ROS:   Constitutional: No fever, chills, or sweats. Weight stable.   ENT: No visual disturbance, ear ache, epistaxis, sore throat.   Cardiovascular: As per HPI.   Respiratory: No cough, hemoptysis.    GI: No nausea, vomiting, hematemesis, melena, or hematochezia.   : No hematuria.   Integument: Negative.   Psychiatric: Negative.   Hematologic:  Easy bruising, no easy bleeding.  Neuro: Negative.   Endocrinology: No significant heat or cold intolerance   Musculoskeletal: No myalgia.    Exam:  There were no vitals taken for this visit.  GENERAL APPEARANCE: healthy, alert and no distress    Labs:  CBC RESULTS:   Lab Results   Component Value Date    WBC 7.8 10/04/2022    WBC 8.8 07/05/2021    RBC 4.71 10/04/2022    RBC 4.30 (L) 07/05/2021    HGB 15.9 10/04/2022    HGB 13.8 07/05/2021    HCT 44.5 10/04/2022    HCT 38.9 (L) 07/05/2021    MCV 95 10/04/2022    MCV 91 07/05/2021    MCH 33.8 (H) 10/04/2022    MCH 32.1 07/05/2021    MCHC 35.7 10/04/2022    MCHC 35.5 07/05/2021    RDW 12.3 10/04/2022    RDW 13.0 07/05/2021    PLT 88 (L) 10/04/2022     (L) 07/05/2021       BMP RESULTS:  Lab Results   Component Value Date     (L) 10/04/2022     07/05/2021    POTASSIUM 4.9 10/04/2022    POTASSIUM 4.5 04/05/2022    POTASSIUM 3.3 (L) 07/05/2021    CHLORIDE 97 (L) 10/04/2022    CHLORIDE 100 04/05/2022    CHLORIDE 103 07/05/2021    CO2 29 10/04/2022    CO2 32 04/05/2022    CO2 28 07/05/2021    ANIONGAP 9 10/04/2022    ANIONGAP 4 04/05/2022    ANIONGAP 4 07/05/2021     (H) 10/04/2022     (H) 04/05/2022    GLC 97 07/05/2021    BUN 33.0 (H) 10/04/2022    BUN 35 (H) 04/05/2022    BUN 25 07/05/2021    CR 1.04 10/04/2022    CR 1.15 07/05/2021    GFRESTIMATED 75 10/04/2022    GFRESTIMATED 63 07/05/2021    GFRESTBLACK  73 07/05/2021    GILBERT 9.3 10/04/2022    GILBERT 8.3 (L) 07/05/2021        INR RESULTS:  Lab Results   Component Value Date    INR 2.3 (A) 10/28/2022    INR 2.4 (A) 10/21/2022    INR 3.4 (A) 10/14/2022    INR 2.7 (A) 10/07/2022    INR 2.4 (A) 11/19/2021    INR 1.9 (A) 07/16/2021    INR 2.7 (A) 07/09/2021    INR 2.87 (H) 07/05/2021    INR 1.8 (A) 07/02/2021       Procedures:      Assessment and Plan:       Paroxysmal atrial fibrillation-well-controlled by flecainide and metoprolol    Nonischemic cardiomyopathy-resolved    It is encouraging that a recent transthoracic echocardiogram showed preserved left ventricular ejection fraction.  It is further encouraging that patient has not had recurrent atrial fibrillation episodes.  We will continue current medications and see patient again by video visit in 1 year.      All questions and concerns were addressed and patient is happy with the plan.     CC  Patient Care Team:  Anya Nowak MD as PCP - General (Family Practice)  Augusto Miller MD as MD (Hematology)  Jesusita Mejia PA-C as Physician Assistant  Pino Sharma MD as MD (Internal Medicine)  Fabi Jimenez MD as MD (INTERNAL MEDICINE - ENDOCRINOLOGY, DIABETES & METABOLISM)  Bekah Matt MD as MD (Cardiology)  Tina Mejia, RN as Specialty Care Coordinator (Cardiology)  Anya Nowak MD as Assigned PCP  Bekah Matt MD as Assigned Heart and Vascular Provider  Ángel Hill MD as Assigned Neuroscience Provider  Rosalva Samuels MD as MD (Family Medicine)  Jose G Hawley MD as Assigned Surgical Provider  Bekah Matt MD as MD (Clinical Cardiac Electrophysiology)  Marcelo Jacques MD as Assigned Infectious Disease Provider  Uriel Van, RN as Lead Care Coordinator (Primary Care - CC)  Lizbeth Chen [facility] as Pharmacist (Pharmacist Ambulatory Care)  Vahid Edward MD as Assigned Pulmonology Provider  Leslie Clifford [facility] as Pharmacist  (Pharmacist)  Bonnie Walls, RN as Specialty Care Coordinator (Hematology & Oncology)  Maye Edmondson as Community Health Worker (Primary Care - CC)  Reg Cantu MD as MD (Critical Care)  Lizbeth Chen [facility] as Assigned MTM Pharmacist

## 2022-10-31 NOTE — PATIENT INSTRUCTIONS
Thank you for coming to the [facility] at [place]; please note the following instructions:    1. Dr. Matt recommends to follow up in 1 year.  The cardiology team will contact you to schedule when the time gets closer.          If you have any questions regarding your visit, please contact your care team:     CARDIOLOGY  TELEPHONE NUMBER   Tiffany PÉREZ, Registered Nurse  Salma KELSEY, Registered Nurse  Annabelle NEFF, Registered Medical Assistant  Zabrina CACERES, Visit Facilitator  aNyana ZELAYA, Certified Medical Assistant [phone] (select option 1)    *After hours: [phone]   For Scheduling Appts:     [phone] (select option 1)    *After hours: [phone]   For the Device Clinic (Pacemakers and ICD's)  Chelsey MEDINA, Registered Nurse   During business hours: [phone]    *After business hours:  [phone] (select option 4)      Normal test result notifications will be released via [employer] or mailed within 7 business days.  All other test results, will be communicated via telephone once reviewed by your cardiologist.    If you need a medication refill, please contact your pharmacy.  Please allow 3 business days for your refill to be completed.    As always, thank you for trusting us with your health care needs!

## 2022-10-31 NOTE — LETTER
10/31/2022      RE: Haresh Rock  [address]  [place] [address]       Dear Colleague,    Thank you for the opportunity to participate in the care of your patient, Haresh Rock, at the [facility]. Please see a copy of my visit note below.    Video-Visit Details     Video Start Time: 11:22 AM     Type of service:  Video Visit     Video End Time: 11:30 AM     Originating Location (pt. Location): Home          Distant Location (provider location):  Off-site      Blood Pressure: 104/61 mm/Hg  Pulse: unknown bpm  Weight: 162 lbs  Height: 5 ft 11 in      Nayana Marks CMA ([facility])    HPI: Purpose of visit follow-up of atrial fibrillation    Haresh Rock is a 72 year old male with history of atrial fibrillation (on metoprolol, flecainide, and Coumadin), history of steroid-induced type 2 diabetes (resolved), dyslipidemia,  myeloproliferative disorder status post bone marrow transplant, and Parkinson's disease.    Patient's last visit with me was in October 2021.  Shortly after that visit patient had a feeding tube placed in his stomach because of the inability to swallow due to Parkinson's disease.  This has fortunately reduced the frequency of aspiration pneumonia events and also facilitated feeding and gaining weight.    Patient has been doing well from an atrial fibrillation standpoint.  There is no known recurrent atrial fibrillation episodes.  Patient did not report any symptoms of prolonged palpitations, exertional dyspnea, exertional angina, frequent lightheadedness, presyncope or syncope.      PAST MEDICAL HISTORY:  Past Medical History:   Diagnosis Date     Abnormal dopamine scan (DaTSCAN) 2020 11/04/2020    [phone] 11/3/2020   Narrative & Impression   Examination: Nuclear medicine DATscan for Dopamine Receptor Localization.   Examination: NM Brain Image Tomographic (SPECT) DATscan   Date: 11/3/2020 3:21 PM    Indication: Parkinsonism   Comparison: None   Additional Information: none   Interfering Medications: None   Technique:   The patient initially received 1 ml of Lugol's solution orally prior     Antiphospholipid antibody syndrome (H)     Ravi's, noted negative per testing re-done in 2008 in hematology note 01/13/2009     Articulation disorder 09/23/2020     Atrial fibrillation (H) 04/2011     Benign prostatic hyperplasia with urinary retention      Cirrhosis (H)      CKD (chronic kidney disease) stage 2, GFR 60-89 ml/min      Diabetes mellitus type II     steroid induced     DJD (degenerative joint disease) of knee     SHAWN, worse on right     DVT (deep venous thrombosis) (H)      ED (erectile dysfunction)      Gallbladder polyp 05/2010     Ifysm-Zosguo-Cjms Disease 2007    BMT     Hemochromatosis      Hodgkin's disease NOS     5 cycles of ABVD in 2011     HTN (hypertension)      Hyperlipidaemia LDL goal <100      Multiple thyroid nodules     benign      Myeloproliferative disorder (H)     atypical, [facility]     Parkinson disease (H) 09/24/2020     PE (pulmonary embolism) 11/2004     Polyneuropathy      Pressure ulcer      Primary pulmonary hypertension (H)      Severe protein-calorie malnutrition (H) 11/10/2021     Small bowel obstruction (H) 10/29/2021     Thrombocytopenia (H) 06/08/2021       CURRENT MEDICATIONS:  Current Outpatient Medications   Medication Sig Dispense Refill     amoxicillin (AMOXIL) 500 MG capsule Take 4 capsules (2,000 mg) by mouth once Take 1 hour prior to dental procedure       carbidopa-levodopa (SINEMET)  MG tablet 1 tab 3/day at 8am, 2pm and 8pm 270 tablet 3     flecainide (TAMBOCOR) 50 MG tablet Take 1 tablet (50 mg) by mouth 2 times daily 180 tablet 1     loratadine (CLARITIN REDITABS) 10 MG ODT Take 10 mg by mouth daily       Metoprolol Succinate (KAPSPARGO) 25 MG CS24 1 capsule by Tube route daily . Open capsule and add contents to an all plastic oral tip syringe; add 15 mL of  water. Gently shake the syringe for ~10 seconds. Immediately deliver mixture through tube. No granules should remain in the syringe; rinse syringe with additional water if necessary. 90 capsule 3     NONFORMULARY Abbott osmolyte feedings 2 cans 3/day       omeprazole (PRILOSEC) 20 MG DR capsule Take 1 capsule (20 mg) by mouth every morning 90 capsule 3     PREVIDENT 5000 DRY MOUTH 1.1 % GEL topical gel Apply to affected area daily   3     rosuvastatin (CRESTOR) 40 MG tablet Take 1 tablet (40 mg) by mouth At Bedtime 90 tablet 3     vitamin D3 (CHOLECALCIFEROL) 50 mcg (2000 units) tablet Take 1 tablet (50 mcg) by mouth daily       warfarin ANTICOAGULANT (COUMADIN) 2.5 MG tablet Take 5mg Friday & 3.75mg all the other days or as directed by [facility] clinic 180 tablet 1       PAST SURGICAL HISTORY:  Past Surgical History:   Procedure Laterality Date     ANESTHESIA CARDIOVERSION  04/21/2011    Procedure:ANESTHESIA CARDIOVERSION; Surgeon:GENERIC ANESTHESIA PROVIDER; Location:[facility] OR     ARTHROSCOPY KNEE RT/LT      LT     CATARACT IOL, RT/LT  2017     COLONOSCOPY  10/16/2012    Procedure: COLONOSCOPY;  Colonoscopy, screening;  Surgeon: Reg Feliciano MD;  Location: [facility] OR     COLONOSCOPY N/A 04/14/2021    Procedure: COLONOSCOPY;  Surgeon: Reg Holm MD;  Location: [facility]     ESOPHAGOSCOPY, GASTROSCOPY, DUODENOSCOPY (EGD), COMBINED N/A 10/07/2020    Procedure: ESOPHAGOGASTRODUODENOSCOPY, WITH BIOPSY;  Surgeon: Raul Sawyer DO;  Location: [facility] OR     H ABLATION FOCAL AFIB  03/05/2013    PVI     H ABLATION FOCAL AFIB  01/01/2018     HC BONE MARROW/STEM TRANSPLANT ALLOGENIC  05/08/2007     INSERT PORT VASCULAR ACCESS       IR GASTROSTOMY TUBE PERCUTANEOUS PLCMNT  10/25/2021     JOINT REPLACEMTN, KNEE RT/LT  03/28/2012    SHAWN     LAPAROSCOPY DIAGNOSTIC (GENERAL) N/A 10/29/2021    Procedure: LAPAROSCOPY, DIAGNOSTIC, TAKEDOWN OF MALPOSITIONED GASTROSTOMY TUBE, INSERTION OF GASTROSTOMY TUBE, SMALL BOWEL RESECTION X1,  PRIMARY REPAIR OF SMALL BOWEL ENTEROTOMY, ABDOMINAL WASHOUT, TAP BLOCK;  Surgeon: Leif Finney DO;  Location: [facility] OR     PEG TUBE PLACEMENT  10/25/2021     VASECTOMY  1990       ALLERGIES:     Allergies   Allergen Reactions     Seasonal Allergies        FAMILY HISTORY:  - Premature coronary artery disease  - Atrial fibrillation  - Sudden cardiac death     SOCIAL HISTORY:  Social History     Tobacco Use     Smoking status: Never     Smokeless tobacco: Never   Substance Use Topics     Alcohol use: No     Drug use: No       ROS:   Constitutional: No fever, chills, or sweats. Weight stable.   ENT: No visual disturbance, ear ache, epistaxis, sore throat.   Cardiovascular: As per HPI.   Respiratory: No cough, hemoptysis.    GI: No nausea, vomiting, hematemesis, melena, or hematochezia.   : No hematuria.   Integument: Negative.   Psychiatric: Negative.   Hematologic:  Easy bruising, no easy bleeding.  Neuro: Negative.   Endocrinology: No significant heat or cold intolerance   Musculoskeletal: No myalgia.    Exam:  There were no vitals taken for this visit.  GENERAL APPEARANCE: healthy, alert and no distress    Labs:  CBC RESULTS:   Lab Results   Component Value Date    WBC 7.8 10/04/2022    WBC 8.8 07/05/2021    RBC 4.71 10/04/2022    RBC 4.30 (L) 07/05/2021    HGB 15.9 10/04/2022    HGB 13.8 07/05/2021    HCT 44.5 10/04/2022    HCT 38.9 (L) 07/05/2021    MCV 95 10/04/2022    MCV 91 07/05/2021    MCH 33.8 (H) 10/04/2022    MCH 32.1 07/05/2021    MCHC 35.7 10/04/2022    MCHC 35.5 07/05/2021    RDW 12.3 10/04/2022    RDW 13.0 07/05/2021    PLT 88 (L) 10/04/2022     (L) 07/05/2021       BMP RESULTS:  Lab Results   Component Value Date     (L) 10/04/2022     07/05/2021    POTASSIUM 4.9 10/04/2022    POTASSIUM 4.5 04/05/2022    POTASSIUM 3.3 (L) 07/05/2021    CHLORIDE 97 (L) 10/04/2022    CHLORIDE 100 04/05/2022    CHLORIDE 103 07/05/2021    CO2 29 10/04/2022    CO2 32 04/05/2022    CO2 28  07/05/2021    ANIONGAP 9 10/04/2022    ANIONGAP 4 04/05/2022    ANIONGAP 4 07/05/2021     (H) 10/04/2022     (H) 04/05/2022    GLC 97 07/05/2021    BUN 33.0 (H) 10/04/2022    BUN 35 (H) 04/05/2022    BUN 25 07/05/2021    CR 1.04 10/04/2022    CR 1.15 07/05/2021    GFRESTIMATED 75 10/04/2022    GFRESTIMATED 63 07/05/2021    GFRESTBLACK 73 07/05/2021    GILBERT 9.3 10/04/2022    GILBERT 8.3 (L) 07/05/2021        INR RESULTS:  Lab Results   Component Value Date    INR 2.3 (A) 10/28/2022    INR 2.4 (A) 10/21/2022    INR 3.4 (A) 10/14/2022    INR 2.7 (A) 10/07/2022    INR 2.4 (A) 11/19/2021    INR 1.9 (A) 07/16/2021    INR 2.7 (A) 07/09/2021    INR 2.87 (H) 07/05/2021    INR 1.8 (A) 07/02/2021       Procedures:      Assessment and Plan:       Paroxysmal atrial fibrillation-well-controlled by flecainide and metoprolol    Nonischemic cardiomyopathy-resolved    It is encouraging that a recent transthoracic echocardiogram showed preserved left ventricular ejection fraction.  It is further encouraging that patient has not had recurrent atrial fibrillation episodes.  We will continue current medications and see patient again by video visit in 1 year.      All questions and concerns were addressed and patient is happy with the plan.     CC  Patient Care Team:  Anya Nowak MD as PCP - General (Family Practice)  Augusto Miller MD as MD (Hematology)  Jesusita Mejia PA-C as Physician Assistant  Pino Sharma MD as MD (Internal Medicine)  Fabi Jimenez MD as MD (INTERNAL MEDICINE - ENDOCRINOLOGY, DIABETES & METABOLISM)  Bekah Matt MD as MD (Cardiology)  Tina Mejia RN as Specialty Care Coordinator (Cardiology)  Anya Nowak MD as Assigned PCP  Vernell Bekah Paul MD as Assigned Heart and Vascular Provider  Sergio, Ángel Allen MD as Assigned Neuroscience Provider  Rosalva Samuels MD as MD (Family Medicine)  Marleen, Jose G Macias MD as Assigned Surgical Provider  Vernell  Bekah Paul MD as MD (Clinical Cardiac Electrophysiology)  Marcelo Jacques MD as Assigned Infectious Disease Provider  Uriel Van RN as Lead Care Coordinator (Primary Care - CC)  Lizbeth Chen RPH as Pharmacist (Pharmacist Ambulatory Care)  Vahid Edward MD as Assigned Pulmonology Provider  Leslie Clifford RP as Pharmacist (Pharmacist)  Bonnie Walls RN as Specialty Care Coordinator (Hematology & Oncology)  Maye Edmondson as Community Health Worker (Primary Care - CC)  Reg Cantu MD as MD (Critical Care)  Lizbeth Chen RPH as Assigned MTM Pharmacist          Please do not hesitate to contact me if you have any questions/concerns.     Sincerely,     Bekah Matt MD

## 2022-11-01 RX ORDER — METOPROLOL TARTRATE 25 MG/1
12.5 TABLET, FILM COATED ORAL 2 TIMES DAILY
Qty: 90 TABLET | Refills: 3 | Status: SHIPPED | OUTPATIENT
Start: 2022-11-01 | End: 2023-10-11

## 2022-11-01 NOTE — TELEPHONE ENCOUNTER
Covering for Emi but will leave here in case she has further input. I consulted with [employer]. Metoprolol succinate in other formulations not advisable to crush and flush via G tube. If patient wishes to save money, crushing metoprolol tartrate and taking half his total daily dose twice daily (looks like 12.5mg twice daily) would be fine. [employer] can make a suspension from tartrate tablets but this is not generally cost effective for Medicare patients.    Please reach out with further questions.    Edwina Molina, Leticia, [facility]  Medication Therapy Management Provider  Phone: [phone]  [email or web address]

## 2022-11-04 ENCOUNTER — TRANSFERRED RECORDS (OUTPATIENT)
Dept: HEALTH INFORMATION MANAGEMENT | Facility: CLINIC | Age: 72
End: 2022-11-04

## 2022-11-04 ENCOUNTER — DOCUMENTATION ONLY (OUTPATIENT)
Dept: ANTICOAGULATION | Facility: CLINIC | Age: 72
End: 2022-11-04

## 2022-11-04 DIAGNOSIS — Z86.711 PERSONAL HISTORY OF PE (PULMONARY EMBOLISM): ICD-10-CM

## 2022-11-04 DIAGNOSIS — Z79.01 LONG TERM CURRENT USE OF ANTICOAGULANT THERAPY: Primary | ICD-10-CM

## 2022-11-04 DIAGNOSIS — I48.0 PAROXYSMAL ATRIAL FIBRILLATION (H): ICD-10-CM

## 2022-11-04 DIAGNOSIS — I48.20 CHRONIC ATRIAL FIBRILLATION (H): ICD-10-CM

## 2022-11-04 LAB — INR (EXTERNAL): 2.4 (ref 0.9–1.1)

## 2022-11-04 NOTE — PROGRESS NOTES
ANTICOAGULATION  MANAGEMENT-Home Monitor Managed by Exception    Haresh EMILIE Rock 72 year old male is on warfarin with therapeutic INR result. (Goal INR 2.0-3.0)    Recent labs: (last 7 days)     11/04/22  0908   INR 2.4*         Previous INR was Therapeutic    Medication, diet, health changes since last INR:chart reviewed; none identified    Contacted within the last 12 weeks by phone on 10/21/22           MINNIE Hutson was NOT contacted regarding therapeutic result today per home monitoring policy manage by exception agreement.   Current warfarin dose is to be continued:     Summary  As of 11/4/2022    Full warfarin instructions:  5 mg every Fri; 3.75 mg all other days; Starting 11/4/2022   Next INR check:  11/11/2022           ?   Blanca Rodriguez RN  Anticoagulation Clinic  11/4/2022    _______________________________________________________________________     Anticoagulation Episode Summary     Current INR goal:  2.0-3.0   TTR:  88.0 % (1 y)   Target end date:  Indefinite   Send INR reminders to:  [facility]    Indications    Long-term (current) use of anticoagulants [Z79.01] [Z79.01]  Atrial fibrillation (H) [I48.91]  Antiphospholipid antibody syndrome (H) (Resolved) [D68.61]  Personal history of DVT (deep vein thrombosis) (Resolved) [Z86.718]  Atrial fibrillation  unspecified type (H) (Resolved) [I48.91]  Paroxysmal atrial fibrillation (H) [I48.0]  Chronic atrial fibrillation (H) [I48.20]  Personal history of PE (pulmonary embolism) [Z86.711]           Comments:  JESSICA rodas to manage by exception         Anticoagulation Care Providers     Provider Role Specialty Phone number    Anya Nowak MD Referring Family Medicine [phone]

## 2022-11-10 ENCOUNTER — TELEPHONE (OUTPATIENT)
Dept: UROLOGY | Facility: CLINIC | Age: 72
End: 2022-11-10

## 2022-11-10 NOTE — TELEPHONE ENCOUNTER
[facility] Call Center    Phone Message    May a detailed message be left on voicemail: yes     Reason for Call: Pt stated he has a testicular lump. Writer scheduled earliest available 1/3 and placed on wait list. Please call pt to reschedule sooner if available/Dr chatman thinks he should be seen sooner. Thank you    Action Taken: Message routed to:  Other: Uro    Travel Screening: Not Applicable

## 2022-11-11 ENCOUNTER — TRANSFERRED RECORDS (OUTPATIENT)
Dept: HEALTH INFORMATION MANAGEMENT | Facility: CLINIC | Age: 72
End: 2022-11-11

## 2022-11-11 ENCOUNTER — DOCUMENTATION ONLY (OUTPATIENT)
Dept: ANTICOAGULATION | Facility: CLINIC | Age: 72
End: 2022-11-11

## 2022-11-11 DIAGNOSIS — I48.0 PAROXYSMAL ATRIAL FIBRILLATION (H): ICD-10-CM

## 2022-11-11 DIAGNOSIS — Z86.711 PERSONAL HISTORY OF PE (PULMONARY EMBOLISM): ICD-10-CM

## 2022-11-11 DIAGNOSIS — Z79.01 LONG TERM CURRENT USE OF ANTICOAGULANT THERAPY: Primary | ICD-10-CM

## 2022-11-11 DIAGNOSIS — I48.20 CHRONIC ATRIAL FIBRILLATION (H): ICD-10-CM

## 2022-11-11 LAB — INR (EXTERNAL): 2.7 (ref 0.9–1.1)

## 2022-11-11 NOTE — TELEPHONE ENCOUNTER
Patient called back this morning stating he wants to get in today with either Dr. Hawley or a different dr. I said I see a message was sent to the care team yesterday letting them know you would like to get in sooner but I could let them know that you called back this morning for an update. He said if he isn't able to get in asap that he'll go to urgent or emergency room. Please call patient back. Thank you.

## 2022-11-11 NOTE — PROGRESS NOTES
ANTICOAGULATION  MANAGEMENT-Home Monitor Managed by Exception    Haresh EMILIE Rock 72 year old male is on warfarin with therapeutic INR result. (Goal INR 2.0-3.0)    Recent labs: (last 7 days)     11/11/22  0821   INR 2.7*         Previous INR was Therapeutic    Medication, diet, health changes since last INR:chart reviewed; none identified    Contacted within the last 12 weeks by phone on 10/21/22      MINNIE Hutson was NOT contacted regarding therapeutic result today per home monitoring policy manage by exception agreement.   Current warfarin dose is to be continued:     Summary  As of 11/11/2022    Full warfarin instructions:  5 mg every Fri; 3.75 mg all other days; Starting 11/11/2022   Next INR check:  11/18/2022           ?   Macrina Preston RN  Anticoagulation Clinic  11/11/2022    _______________________________________________________________________     Anticoagulation Episode Summary     Current INR goal:  2.0-3.0   TTR:  89.0 % (1 y)   Target end date:  Indefinite   Send INR reminders to:  [facility]    Indications    Long-term (current) use of anticoagulants [Z79.01] [Z79.01]  Atrial fibrillation (H) [I48.91]  Antiphospholipid antibody syndrome (H) (Resolved) [D68.61]  Personal history of DVT (deep vein thrombosis) (Resolved) [Z86.718]  Atrial fibrillation  unspecified type (H) (Resolved) [I48.91]  Paroxysmal atrial fibrillation (H) [I48.0]  Chronic atrial fibrillation (H) [I48.20]  Personal history of PE (pulmonary embolism) [Z86.711]           Comments:  JESSICA rodas to manage by exception         Anticoagulation Care Providers     Provider Role Specialty Phone number    Anya Nowak MD Referring Family Medicine [phone]

## 2022-11-11 NOTE — TELEPHONE ENCOUNTER
Writer called and spoke with patient.  Patient states that since he did not get a call this morning he went to the emergency room where he was diagnosed with epididymitis and placed on Levaquin.  He will keep his appointment in January with Dr. Hawley at this time but is requesting if a sooner appointment opens up to let him know.    Cathryn BLACKMAN RN Urology 11/11/2022 2:56 PM

## 2022-11-14 ENCOUNTER — PATIENT OUTREACH (OUTPATIENT)
Dept: CARE COORDINATION | Facility: CLINIC | Age: 72
End: 2022-11-14

## 2022-11-14 NOTE — PROGRESS NOTES
Clinic Care Coordination Contact  [facility]/Voicemail       Clinical Data: Care Coordinator Outreach  Outreach attempted x 1.  Left message on patient's voicemail with call back information and requested return call.  Plan: Care Coordinator sent care coordination introduction letter on 11/5/21 via [employer]. Care Coordinator will try to reach patient again in 1-2 business days.    Uriel Zhou MSN, RN, PHN, CCM   Primary Care Clinical RN Care Coordinator  [facility]  11/14/2022   1:31 PM  [email or web address]  Office: [phone]

## 2022-11-14 NOTE — LETTER
[facility]  Patient Centered Plan of Care  About Me:        Patient Name:  Haresh Granados    YOB: 1950  Age:         72 year old   Radha MRN:    [id number] Telephone Information:  Home Phone [phone]   Mobile [phone]       Address:  [address] [place] [address] Email address:  [email or web address]      Emergency Contact(s)    Name Relationship Lgl Grd Work Phone Home Phone Mobile Phone   1. JANICE GRANADOS Spouse No   [phone]   2. DARWIN, LAY Daughter No   [phone]   3. SOL GRANADOS Son No   [phone]           Primary language:  English     needed? No   [place] [employer] [facility]:  [phone] op. 1  Other communication barriers:Glasses; Language barrier    Preferred Method of Communication:  Mail  Current living arrangement: I live in a private home with family    Mobility Status/ Medical Equipment: Independent        Health Maintenance  Health Maintenance Reviewed: Due/Overdue   Health Maintenance Due   Topic Date Due     HF ACTION PLAN  Never done     ZOSTER IMMUNIZATION (1 of 2) 11/14/2016     EYE EXAM  01/01/2022     DTAP/TDAP/TD IMMUNIZATION (3 - Td or Tdap) 09/11/2022           My Access Plan  Medical Emergency 911   Primary Clinic Line [facility] - [phone]   24 Hour Appointment Line [phone] or  [phone]) (toll-free)   24 Hour Nurse Line [phone] (toll-free)   Preferred Urgent Care No data recorded   Preferred Hospital [facility]  [phone]     Preferred Pharmacy [id number] [place] - [address] [facility]     Behavioral Health Crisis Line The National Suicide Prevention Lifeline at 1-714.514.3931 or Text/Call 598             My Care Team Members  Patient Care Team       Relationship Specialty Notifications Start End    Anya Nowak MD PCP - General Family Practice  4/23/19     Phone: [phone] Fax: [phone]          [address] [facility] [address]    Augusto Miller MD MD Hematology Admissions 9/30/11     Referred to Endocrinology    Phone: [phone] Fax: [phone]         [address] [facility] [address]    Jesusita Mejia PA-C Physician Assistant   3/21/12     Phone: [phone] Fax: [phone]         [address] [facility] [address] [facility] [address]    Pino Sharma MD MD Internal Medicine  4/11/16     Phone: [phone] Fax: [phone]         [address] [facility] [address] [facility] [address]    Fabi Jimenez MD MD INTERNAL MEDICINE - ENDOCRINOLOGY, DIABETES & METABOLISM  4/18/16     Phone: [phone] Fax: [phone]         [address] [facility] [address] [facility] [address]    Bekah Matt MD MD Cardiology  8/12/16     Phone: [phone] Fax: [phone]         [address] [place] [address] [address] [facility] [address]    Tina Mejia RN Specialty Care Coordinator Cardiology  3/6/19     Phone: [phone] Fax: [phone]         [address] [facility] [address]    Anya Nowak MD Assigned PCP   4/28/19     Phone: [phone] Fax: [phone] [address] [facility] [address]    Bekah Matt MD Assigned Heart and Vascular Provider   10/23/20     Phone: [phone] Fax: [phone]         [address] [facility] [address] [place] [address]    Ángel Hill MD Assigned Neuroscience Provider   11/15/20     Phone: [phone] Fax: [phone]         [address] [place] [address]    Rosalva Samuels MD MD Family Medicine  6/1/21     Phone: [phone] Fax: [phone] [address] [facility] [address]    Jose G Hawley MD Assigned Surgical Provider   8/1/21     Phone: [phone] Fax: [phone]         [address] [place] [address] [facility] [address]    Bekah Matt MD MD Clinical Cardiac Electrophysiology  9/10/21     Phone: [phone] Fax: [phone]         [address] [facility] [address] [facility] [address]    Sawyer  Marcelo CHEN MD Assigned Infectious Disease Provider   9/12/21     Phone: [phone] Fax: [phone]         [address] [facility] [address]    Uriel Van, RN Lead Care Coordinator Primary Care - CC Admissions 11/5/21     Phone: [phone] Fax: [phone]        Lizbeth Chen, [facility] Pharmacist Pharmacist Ambulatory Care  11/12/21     Phone: [phone] [address] [facility] [address]    Vahid Edward MD Assigned Pulmonology Provider   12/12/21     Phone: [phone] Fax: [phone]         [address] [place] [address]    Leslie Clifford [facility] Pharmacist Pharmacist  12/13/21     Phone: [phone] Pager: [phone]         [address] [facility] [address]    Bonnie Walls RN Specialty Care Coordinator Hematology & Oncology  12/16/21     Phone: [phone] Pager: [phone]        [facility]e [facility] Worker Primary Care - CC Admissions 4/18/22     Phone: [phone] Fax: [phone]        Reg Cantu MD MD Critical Care  6/9/22     Phone: [phone] Fax: [phone]         [address] [place] [address]    Lizbeth Chen, [facility] Assigned [place] Pharmacist   9/28/22     Phone: [phone] [address] [facility] [address]            My Care Plans  Self Management and Treatment Plan  Care Plan  Care Plan: General  Work on increasing strength and stamina for a year after reaching 100%     Problem: HP GENERAL PROBLEM     Goal: General  work on walking without the walker, and increasing my strength and stamina.  For at least 1 year after reaching 100%.     Start Date: 12/13/2021 Expected End Date: 6/13/2023    This Visit's Progress: 40%    Note:     Barriers: suffers from parkinson's  Strengths: engaged in care coordination  Patient expressed understanding of goal: yes  Action steps to achieve this goal:  1. I will practice in the house walking without the walker. No longer using the walker as of  3/15/22. Completed action step  2. I will go outside without the walker when I feel strong enough and have increased my strength and stamina.  Continue to work on for at least a year.  3. I will frequently walk around inside the house to increase my strength and stamina.  Continue to work on for at least a year.                    Care Plan: General  Annual Wellness visit     Problem: HP GENERAL PROBLEM     Goal: General Goal - Make an appointment for an annual wellness visit.     Start Date: 8/5/2022 Expected End Date: 8/5/2023    Note:     Barriers: suffers from parkinsons  Strengths: engaged in care coordination   Patient expressed understanding of goal: yes  Action steps to achieve this goal:  1. I will schedule my annual wellness visit; [phone] ([phone])  2. I will attend my annual wellness visit.  3. I will contact my Care Management or clinic team if I have barriers to attending my annual wellness visit.                     Care Plan: General - Working on balance and items in the path,     Problem: HP GENERAL PROBLEM     Goal: General Goal - work on balance and avoiding items in the path     Start Date: 10/28/2022 Expected End Date: 10/28/2023    This Visit's Progress: 10%                       Action Plans on File:                       Advance Care Plans/Directives Type:   Advanced Directive - On File      My Medical and Care Information  Problem List   Patient Active Problem List   Diagnosis     Hemochromatosis     Yoatf-ppnurg-hgoa disease, chronic (H)     Advanced directives, counseling/discussion     Polyneuropathy     Status post total knee replacements     Status post bone marrow transplant (H)     Hyperlipidemia with target LDL less than 100     Atrial fibrillation (H)     Chronic rhinitis     Gallstones without obstruction of gallbladder     Long-term (current) use of anticoagulants [Z79.01]     Thyroid nodule     Type 2 diabetes mellitus with stage 2 chronic kidney disease, without  long-term current use of insulin (H)     History of Hodgkin's lymphoma     History of irradiation, presenting hazards to health     Primary pulmonary hypertension (H)     Hereditary hemochromatosis (H)     Oropharyngeal dysphagia     Articulation disorder     Personal history of PE (pulmonary embolism)     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    Care Coordination Start Date: 11/5/2021   Frequency of Care Coordination: monthly     Form Last Updated: 11/14/2022

## 2022-11-15 RX ORDER — LEVOFLOXACIN 500 MG/1
500 TABLET, FILM COATED ORAL
COMMUNITY
Start: 2022-11-11 | End: 2022-11-21

## 2022-11-15 NOTE — PROGRESS NOTES
Clinic Care Coordination Contact    Follow Up Progress Note      Assessment: The RN CC nurse care coordinator contacted the patient by phone for an ED follow up call.  The patient was seen for a lump in his scrotum.  The diagnosis was epididymitis as well as an epididymal cyst.  The patient has been in contact with urology and is working on an appointment that is sooner than the one that he has in January.    Medication review was completed with the patient and marked as reviewed.  Health maintenance was reviewed and questions were answered with the patient.  The assessment for CKD was completed with the patient today as well as the social determinants of health and they were marked as reviewed.      Care Gaps:    Health Maintenance Due   Topic Date Due     HF ACTION PLAN  Never done     ZOSTER IMMUNIZATION (1 of 2) 11/14/2016     EYE EXAM  01/01/2022     DTAP/TDAP/TD IMMUNIZATION (3 - Td or Tdap) 09/11/2022       Care Gaps Last addressed on 11/15/22    Care Plans  Care Plan: General  Work on increasing strength and stamina for a year after reaching 100%     Problem: HP GENERAL PROBLEM     Goal: General  work on walking without the walker, and increasing my strength and stamina.  For at least 1 year after reaching 100%.     Start Date: 12/13/2021 Expected End Date: 6/13/2023    This Visit's Progress: 40% Recent Progress: 40%    Note:     Barriers: suffers from parkinson's  Strengths: engaged in care coordination  Patient expressed understanding of goal: yes  Action steps to achieve this goal:  1. I will practice in the house walking without the walker. No longer using the walker as of 3/15/22. Completed action step  2. I will go outside without the walker when I feel strong enough and have increased my strength and stamina.  Continue to work on for at least a year.  3. I will frequently walk around inside the house to increase my strength and stamina.  Continue to work on for at least a year.                    Care  Plan: General  Annual Wellness visit     Problem: HP GENERAL PROBLEM     Goal: General Goal - Make an appointment for an annual wellness visit.  Completed 11/15/2022    Start Date: 8/5/2022 Expected End Date: 8/5/2023    Note:     Barriers: suffers from parkinsons  Strengths: engaged in care coordination   Patient expressed understanding of goal: yes  Action steps to achieve this goal:  1. I will schedule my annual wellness visit; [phone] ([phone])  2. I will attend my annual wellness visit.  3. I will contact my Care Management or clinic team if I have barriers to attending my annual wellness visit.                     Care Plan: General - Working on balance and items in the path,     Problem: HP GENERAL PROBLEM     Goal: General Goal - work on balance and avoiding items in the path     Start Date: 10/28/2022 Expected End Date: 10/28/2023    This Visit's Progress: 10% Recent Progress: 10%    Note:     Barriers: parkinson's  Strengths: engaged in care coordination  Patient expressed understanding of goal: yes   Action steps to achieve this goal:  1. I will work on walking around items in my path without losing my balance.  2. I will work on maintaining my balance when I walk through the house.  3. I will use my walker or cane as needed to maintain my balance.                          Intervention/Education provided during outreach: The RN CC reviewed the indications to watch for especially a change in the lump.  As well as the importance of finishing all medications.     Outreach Frequency: monthly      Plan:   1.  The patient will finish his antibiotic as prescribed for the infection.  2.  The patient will work on the balance and walking around those items that are in his path.  3.  The patient will make follow up appointments as instructed.    RN CC Nurse Care Coordinator will follow up in 6 weeks.    CHW will follow up with the patient and review goals in 30 days.          Uriel Zhou MSN, RN, PHN, CCM    Primary Care Clinical RN Care Coordinator  [facility]  11/15/2022   3:47 PM  [email or web address]  Office: [phone]

## 2022-11-18 ENCOUNTER — TRANSFERRED RECORDS (OUTPATIENT)
Dept: HEALTH INFORMATION MANAGEMENT | Facility: CLINIC | Age: 72
End: 2022-11-18

## 2022-11-18 ENCOUNTER — ANTICOAGULATION THERAPY VISIT (OUTPATIENT)
Dept: ANTICOAGULATION | Facility: CLINIC | Age: 72
End: 2022-11-18

## 2022-11-18 DIAGNOSIS — I48.91 ATRIAL FIBRILLATION (H): ICD-10-CM

## 2022-11-18 DIAGNOSIS — Z86.711 PERSONAL HISTORY OF PE (PULMONARY EMBOLISM): ICD-10-CM

## 2022-11-18 DIAGNOSIS — I48.20 CHRONIC ATRIAL FIBRILLATION (H): ICD-10-CM

## 2022-11-18 DIAGNOSIS — I48.0 PAROXYSMAL ATRIAL FIBRILLATION (H): ICD-10-CM

## 2022-11-18 DIAGNOSIS — Z79.01 LONG TERM CURRENT USE OF ANTICOAGULANT THERAPY: Primary | ICD-10-CM

## 2022-11-18 LAB — INR (EXTERNAL): 3.1 (ref 0.9–1.1)

## 2022-11-18 NOTE — PROGRESS NOTES
ANTICOAGULATION MANAGEMENT     Haresh Rock 72 year old male is on warfarin with supratherapeutic INR result. (Goal INR 2.0-3.0)    Recent labs: (last 7 days)     11/18/22  0836   INR 3.1*       ASSESSMENT       Source(s): Chart Review and Patient/Caregiver Call       Warfarin doses taken: Warfarin taken as instructed    Diet: No new diet changes identified    New illness, injury, or hospitalization: Yes: infection in testicle, epididymitis    Medication/supplement changes: Levoquin started 11/11 for 10 days. 2 days left.    Signs or symptoms of bleeding or clotting: No    Previous INR: Therapeutic last 2(+) visits    Additional findings: None       PLAN     Recommended plan for temporary change(s) affecting INR     Dosing Instructions: Continue your current warfarin dose with next INR in 1 week       Summary  As of 11/18/2022    Full warfarin instructions:  5 mg every Fri; 3.75 mg all other days; Starting 11/18/2022   Next INR check:  11/25/2022             Telephone call with Haresh who verbalizes understanding and agrees to plan    Patient to recheck with home meter    Education provided:     None required    Plan made per [facility] anticoagulation protocol    Aleksandra Rutledge, RN  Anticoagulation Clinic  11/18/2022    _______________________________________________________________________     Anticoagulation Episode Summary     Current INR goal:  2.0-3.0   TTR:  88.7 % (1 y)   Target end date:  Indefinite   Send INR reminders to:  [facility]    Indications    Long-term (current) use of anticoagulants [Z79.01] [Z79.01]  Atrial fibrillation (H) [I48.91]  Antiphospholipid antibody syndrome (H) (Resolved) [D68.61]  Personal history of DVT (deep vein thrombosis) (Resolved) [Z86.718]  Atrial fibrillation  unspecified type (H) (Resolved) [I48.91]  Paroxysmal atrial fibrillation (H) [I48.0]  Chronic atrial fibrillation (H) [I48.20]  Personal history of PE (pulmonary embolism) [Z86.711]           Comments:   JESSICA okay to manage by exception         Anticoagulation Care Providers     Provider Role Specialty Phone number    Anya Nowak MD Referring Family Medicine [phone]

## 2022-11-25 ENCOUNTER — DOCUMENTATION ONLY (OUTPATIENT)
Dept: ANTICOAGULATION | Facility: CLINIC | Age: 72
End: 2022-11-25

## 2022-11-25 ENCOUNTER — ANTICOAGULATION THERAPY VISIT (OUTPATIENT)
Dept: ANTICOAGULATION | Facility: CLINIC | Age: 72
End: 2022-11-25

## 2022-11-25 ENCOUNTER — TRANSFERRED RECORDS (OUTPATIENT)
Dept: HEALTH INFORMATION MANAGEMENT | Facility: CLINIC | Age: 72
End: 2022-11-25

## 2022-11-25 LAB — INR (EXTERNAL): 3 (ref 0.9–1.1)

## 2022-11-25 NOTE — PROGRESS NOTES
Incoming fax from [employer] home monitor [employer]    Date of result  11/25/22    INR result 3.0      ANTICOAGULATION MANAGEMENT     Haresh Rock 72 year old male is on warfarin with therapeutic INR result. (Goal INR 2.0-3.0)    Recent labs: (last 7 days)     11/25/22  0000   INR 3.0*       ASSESSMENT       Source(s): Chart Review and Patient/Caregiver Call       Warfarin doses taken: Warfarin taken as instructed    Diet: Patient is on tube feeding but did have a little bit of regular food yesterday. Not a lot patient stated.    New illness, injury, or hospitalization: No    Medication/supplement changes: None noted    Signs or symptoms of bleeding or clotting: No    Previous INR: Supratherapeutic    Additional findings: None       PLAN     Recommended plan for no diet, medication or health factor changes affecting INR     Dosing Instructions: Continue your current warfarin dose with next INR in 1 week       Summary  As of 11/25/2022    Full warfarin instructions:  5 mg every Fri; 3.75 mg all other days; Starting 11/25/2022   Next INR check:  12/2/2022             Telephone call with Haresh who agrees to plan and repeated back plan correctly    Patient to recheck with home meter    Education provided:     Please call back if any changes to your diet, medications or how you've been taking warfarin    Goal range and lab monitoring: goal range and significance of current result    Plan made per [facility] anticoagulation protocol    Gabriela Sosa RN  Anticoagulation Clinic  11/25/2022    _______________________________________________________________________     Anticoagulation Episode Summary     Current INR goal:  2.0-3.0   TTR:  87.2 % (1 y)   Target end date:  Indefinite   Send INR reminders to:  [facility]IT[facility]    Indications    Long-term (current) use of anticoagulants [Z79.01] [Z79.01]  Atrial fibrillation (H) [I48.91]  Antiphospholipid antibody syndrome (H) (Resolved) [D68.61]  Personal history of DVT (deep vein  thrombosis) (Resolved) [Z86.718]  Atrial fibrillation  unspecified type (H) (Resolved) [I48.91]  Paroxysmal atrial fibrillation (H) [I48.0]  Chronic atrial fibrillation (H) [I48.20]  Personal history of PE (pulmonary embolism) [Z86.711]           Comments:  JESSICA rodas to manage by exception         Anticoagulation Care Providers     Provider Role Specialty Phone number    Anya Nowak MD Referring Family Medicine [phone]

## 2022-12-02 ENCOUNTER — TRANSFERRED RECORDS (OUTPATIENT)
Dept: HEALTH INFORMATION MANAGEMENT | Facility: CLINIC | Age: 72
End: 2022-12-02

## 2022-12-02 ENCOUNTER — DOCUMENTATION ONLY (OUTPATIENT)
Dept: ANTICOAGULATION | Facility: CLINIC | Age: 72
End: 2022-12-02

## 2022-12-02 DIAGNOSIS — Z86.711 PERSONAL HISTORY OF PE (PULMONARY EMBOLISM): ICD-10-CM

## 2022-12-02 DIAGNOSIS — I48.20 CHRONIC ATRIAL FIBRILLATION (H): ICD-10-CM

## 2022-12-02 DIAGNOSIS — Z79.01 LONG TERM CURRENT USE OF ANTICOAGULANT THERAPY: Primary | ICD-10-CM

## 2022-12-02 DIAGNOSIS — I48.0 PAROXYSMAL ATRIAL FIBRILLATION (H): ICD-10-CM

## 2022-12-02 LAB — INR (EXTERNAL): 2.9 (ref 0.9–1.1)

## 2022-12-02 NOTE — PROGRESS NOTES
ANTICOAGULATION  MANAGEMENT-Home Monitor Managed by Exception    Haresh Rock 72 year old male is on warfarin with therapeutic INR result. (Goal INR 2.0-3.0)    Recent labs: (last 7 days)     12/02/22  0000   INR 2.9*         Previous INR was Therapeutic    Medication, diet, health changes since last INR:chart reviewed; none identified    Contacted within the last 12 weeks by phone on 11/25/2022    Resume manage by exception.  My chart message sent.      MINNIE Hutson was NOT contacted regarding therapeutic result today per home monitoring policy manage by exception agreement.   Current warfarin dose is to be continued:     Summary  As of 12/2/2022    Full warfarin instructions:  5 mg every Fri; 3.75 mg all other days; Starting 12/2/2022   Next INR check:  12/9/2022           ?   Mayi BLACKMAN RN  Anticoagulation Clinic  12/2/2022    _______________________________________________________________________     Anticoagulation Episode Summary     Current INR goal:  2.0-3.0   TTR:  89.1 % (1 y)   Target end date:  Indefinite   Send INR reminders to:  [facility]    Indications    Long-term (current) use of anticoagulants [Z79.01] [Z79.01]  Atrial fibrillation (H) [I48.91]  Antiphospholipid antibody syndrome (H) (Resolved) [D68.61]  Personal history of DVT (deep vein thrombosis) (Resolved) [Z86.718]  Atrial fibrillation  unspecified type (H) (Resolved) [I48.91]  Paroxysmal atrial fibrillation (H) [I48.0]  Chronic atrial fibrillation (H) [I48.20]  Personal history of PE (pulmonary embolism) [Z86.711]           Comments:  JESSICA rodas to manage by exception         Anticoagulation Care Providers     Provider Role Specialty Phone number    Anya Nowak MD Referring Family Medicine [phone]

## 2022-12-06 DIAGNOSIS — I50.21 ACUTE SYSTOLIC (CONGESTIVE) HEART FAILURE (H): ICD-10-CM

## 2022-12-07 RX ORDER — ROSUVASTATIN CALCIUM 40 MG/1
TABLET, COATED ORAL
Qty: 90 TABLET | Refills: 3 | OUTPATIENT
Start: 2022-12-07

## 2022-12-09 ENCOUNTER — TRANSFERRED RECORDS (OUTPATIENT)
Dept: HEALTH INFORMATION MANAGEMENT | Facility: CLINIC | Age: 72
End: 2022-12-09

## 2022-12-09 LAB — INR (EXTERNAL): 3.2 (ref 0.9–1.1)

## 2022-12-12 ENCOUNTER — ANTICOAGULATION THERAPY VISIT (OUTPATIENT)
Dept: ANTICOAGULATION | Facility: CLINIC | Age: 72
End: 2022-12-12

## 2022-12-12 DIAGNOSIS — I48.20 CHRONIC ATRIAL FIBRILLATION (H): ICD-10-CM

## 2022-12-12 DIAGNOSIS — Z79.01 LONG TERM CURRENT USE OF ANTICOAGULANT THERAPY: Primary | ICD-10-CM

## 2022-12-12 DIAGNOSIS — Z86.711 PERSONAL HISTORY OF PE (PULMONARY EMBOLISM): ICD-10-CM

## 2022-12-12 DIAGNOSIS — I48.0 PAROXYSMAL ATRIAL FIBRILLATION (H): ICD-10-CM

## 2022-12-12 NOTE — PROGRESS NOTES
ANTICOAGULATION MANAGEMENT     Haresh Rock 72 year old male is on warfarin with supratherapeutic INR result. (Goal INR 2.0-3.0)    Recent labs: (last 7 days)     12/09/22  2100   INR 3.2*       ASSESSMENT       Source(s): Chart Review and Patient/Caregiver Call       Warfarin doses taken: Warfarin taken as instructed    Diet: No new diet changes identified    New illness, injury, or hospitalization: No    Medication/supplement changes: None noted    Signs or symptoms of bleeding or clotting: No    Previous INR: Therapeutic last 2(+) visits    Additional findings: None       PLAN     Recommended plan for no diet, medication or health factor changes affecting INR     Dosing Instructions: patient took 2.5 mg on Friday when he got the result. Will continue with maintenance dose with next INR in 1 week       Summary  As of 12/12/2022    Full warfarin instructions:  5 mg every Fri; 3.75 mg all other days; Starting 12/12/2022   Next INR check:  12/16/2022             Telephone call with Haresh who verbalizes understanding and agrees to plan    Patient to recheck with home meter    Education provided:     advised to check INR before 4 pm so that he is called same day.    Plan made per [facility] anticoagulation protocol    Aleksandra Rutledge, RN  Anticoagulation Clinic  12/12/2022    _______________________________________________________________________     Anticoagulation Episode Summary     Current INR goal:  2.0-3.0   TTR:  89.9 % (1 y)   Target end date:  Indefinite   Send INR reminders to:  [facility]    Indications    Long-term (current) use of anticoagulants [Z79.01] [Z79.01]  Atrial fibrillation (H) [I48.91]  Antiphospholipid antibody syndrome (H) (Resolved) [D68.61]  Personal history of DVT (deep vein thrombosis) (Resolved) [Z86.718]  Atrial fibrillation  unspecified type (H) (Resolved) [I48.91]  Paroxysmal atrial fibrillation (H) [I48.0]  Chronic atrial fibrillation (H) [I48.20]  Personal history of PE  (pulmonary embolism) [Z86.711]           Comments:  JESSICA okay to manage by exception         Anticoagulation Care Providers     Provider Role Specialty Phone number    Anya Nowak MD Referring Family Medicine [phone]

## 2022-12-15 ENCOUNTER — PATIENT OUTREACH (OUTPATIENT)
Dept: CARE COORDINATION | Facility: CLINIC | Age: 72
End: 2022-12-15

## 2022-12-15 NOTE — PROGRESS NOTES
Clinic Care Coordination Contact    Follow Up Progress Note      Assessment: The RN CC nurse care coordinator contacted the patient by phone for a follow-up call.  At the first call the patient was outside doing some shoveling.  He did admit that the snow was extremely heavy and he was not able to shovel it very far.  The patient has been able to walk without his walker and is finding that the work on his balance is beneficial.    Medication review was completed with the patient and marked as reviewed.  Health maintenance was reviewed and questions were answered with the patient.  The assessment for CKD was completed with the patient today as well as the social determinants of health and they were marked as reviewed.      Care Gaps:    Health Maintenance Due   Topic Date Due     HF ACTION PLAN  Never done     ZOSTER IMMUNIZATION (1 of 2) 11/14/2016     EYE EXAM  01/01/2022     DTAP/TDAP/TD IMMUNIZATION (3 - Td or Tdap) 09/11/2022     CBC  01/04/2023       Care Gaps Last addressed on 12/15/22    Care Plans  Care Plan: General  Work on increasing strength and stamina for a year after reaching 100%     Problem: HP GENERAL PROBLEM     Goal: General  work on walking without the walker, and increasing my strength and stamina.  For at least 1 year after reaching 100%.     Start Date: 12/13/2021 Expected End Date: 6/13/2023    This Visit's Progress: 40% Recent Progress: 40%    Note:     Barriers: suffers from parkinson's  Strengths: engaged in care coordination  Patient expressed understanding of goal: yes  Action steps to achieve this goal:  1. I will practice in the house walking without the walker. No longer using the walker as of 3/15/22. Completed action step  2. I will go outside without the walker when I feel strong enough and have increased my strength and stamina.  Continue to work on for at least a year.  3. I will frequently walk around inside the house to increase my strength and stamina.  Continue to work on  for at least a year.                    Care Plan: General  Annual Wellness visit Completed 12/15/2022    Problem: HP GENERAL PROBLEM  Resolved 12/15/2022    Goal: General Goal - Make an appointment for an annual wellness visit.  Completed 11/15/2022    Start Date: 8/5/2022 Expected End Date: 8/5/2023    Note:     Barriers: suffers from parkinsons  Strengths: engaged in care coordination   Patient expressed understanding of goal: yes  Action steps to achieve this goal:  1. I will schedule my annual wellness visit; [phone] ([phone])  2. I will attend my annual wellness visit.  3. I will contact my Care Management or clinic team if I have barriers to attending my annual wellness visit.                     Care Plan: General - Working on balance and items in the path,     Problem: HP GENERAL PROBLEM     Goal: General Goal - work on balance and avoiding items in the path     Start Date: 10/28/2022 Expected End Date: 10/28/2023    This Visit's Progress: 10% Recent Progress: 10%    Note:     Barriers: parkinson's  Strengths: engaged in care coordination  Patient expressed understanding of goal: yes   Action steps to achieve this goal:  1. I will work on walking around items in my path without losing my balance.  2. I will work on maintaining my balance when I walk through the house.  3. I will use my walker or cane as needed to maintain my balance.                          Intervention/Education provided during outreach: The patient does have a few care gaps that remain one of them being an eye exam.  The patient has completed his annual wellness visit.     Outreach Frequency: monthly        Plan:   1.  The patient will work on walking around items in his path and maintaining his balance.  2.  The patient will use his walker and/or cane as needed to maintain his balance and not fall.  3.  The patient will take all medications as prescribed by the providers.    RN CC Nurse Care Coordinator will follow up in 6  weeks.  The CHW will follow-up in 30 days and check on the goals.        Uriel Zhou MSN, RN, PHN, [facility]   Primary Care Clinical RN Care Coordinator  [facility]  12/15/2022   12:31 PM  [email or web address]  Office: [phone]

## 2022-12-15 NOTE — LETTER
[facility]  Patient Centered Plan of Care  About Me:        Patient Name:  Haresh Rock    YOB: 1950  Age:         72 year old   Radha MRN:    [id number] Telephone Information:  Home Phone [phone]   Mobile [phone]       Address:  [address] [place] [address] Email address:  [email or web address]      Emergency Contact(s)    Name Relationship Lgl Grd Work Phone Home Phone Mobile Phone   1. JANICE ROCK Spouse No   [phone]   2. LAY ROCK Daughter No   [phone]   3. SOL ROCK Son No   [phone]           Primary language:  English     needed? No   [place] [employer] [facility]:  [phone] op. 1  Other communication barriers:Glasses; Language barrier    Preferred Method of Communication:  Mail  Current living arrangement: I live in a private home with family    Mobility Status/ Medical Equipment: Independent        Health Maintenance  Health Maintenance Reviewed: Due/Overdue   Health Maintenance Due   Topic Date Due     HF ACTION PLAN  Never done     ZOSTER IMMUNIZATION (1 of 2) 11/14/2016     EYE EXAM  01/01/2022     DTAP/TDAP/TD IMMUNIZATION (3 - Td or Tdap) 09/11/2022     PHQ-2 (once per calendar year)  01/01/2023     CBC  01/04/2023           My Access Plan  Medical Emergency 911   Primary Clinic Line [facility] - [phone]   24 Hour Appointment Line [phone] or  [phone]) (toll-free)   24 Hour Nurse Line [phone] (toll-free)   Preferred Urgent Care No data recorded   Preferred Hospital [facility]  [phone]     Preferred Pharmacy [id number] - [place] - [address] [facility]     Behavioral Health Crisis Line The National Suicide Prevention Lifeline at 1-888.870.2326 or Text/Call 8             My Care Team Members  Patient Care Team       Relationship Specialty Notifications Start End    Anya Nowak MD PCP - General  Family Practice  4/23/19     Phone: [phone] Fax: [phone]         [address] [place] [address]. [facility] [address]    Augusto Miller MD MD Hematology Admissions 9/30/11     Referred to Endocrinology    Phone: [phone] Fax: [phone]         [address] [facility] [address]    Jesusita Mejia PA-C Physician Assistant   3/21/12     Phone: [phone] Fax: [phone]         [address] [facility] [address] [facility] [address]    Pino Sharma MD MD Internal Medicine  4/11/16     Phone: [phone] Fax: [phone]         [address] [facility] [address] [facility] [address]    Fabi Jimenez MD MD INTERNAL MEDICINE - ENDOCRINOLOGY, DIABETES & METABOLISM  4/18/16     Phone: [phone] Fax: [phone]         [address] [facility] [address] [facility] [address]    Bekah Matt MD MD Cardiology  8/12/16     Phone: [phone] Fax: [phone]         [address] [place] [address] [address] [facility] [address]    Tina Mejia RN Specialty Care Coordinator Cardiology  3/6/19     Phone: [phone] Fax: [phone]         [address] [facility] [address]    Anya Nowak MD Assigned PCP   4/28/19     Phone: [phone] Fax: [phone]         [address] [facility]. [facility] [address]    Bekah Matt MD Assigned Heart and Vascular Provider   10/23/20     Phone: [phone] Fax: [phone]         [address] [facility] [address] [place] [address]    Ángel Hill MD Assigned Neuroscience Provider   11/15/20     Phone: [phone] Fax: [phone]         [address] [place] [address]    Rosalva Samuels MD MD Family Medicine  6/1/21     Phone: [phone] Fax: [phone]         [address] [place] [address]    Jose G Hawley MD Assigned Surgical Provider   8/1/21     Phone: [phone] Fax: [phone] [address] [place] [address]    Bekah Matt MD MD Clinical Cardiac Electrophysiology  9/10/21     Phone: [phone] Fax: [phone]          [address] [facility] [address] [facility] [address]    Marcelo Jacques MD Assigned Infectious Disease Provider   9/12/21     Phone: [phone] Fax: [phone]         [address] [facility] [address]    Uriel Van, RN Lead Care Coordinator Primary Care - CC Admissions 11/5/21     Phone: [phone] Fax: [phone]        Lizbeth Chen [facility] Pharmacist Pharmacist Ambulatory Care  11/12/21     Phone: [phone] [address] [facility] [address]    Vahid Edward MD Assigned Pulmonology Provider   12/12/21     Phone: [phone] Fax: [phone]         [address] [place] [address] [address] [facility] [address]    Leslie Clifford [facility] Pharmacist Pharmacist  12/13/21     Phone: [phone] Pager: [phone]         [address] [facility] [address]    Bonnie Walls RN Specialty Care Coordinator Hematology & Oncology  12/16/21     Phone: [phone] Pager: [phone]        Maye Edmondson Community Health Worker Primary Care - CC Admissions 4/18/22     Phone: [phone] Fax: [phone]        Reg Cantu MD MD Critical Care  6/9/22     Phone: [phone] Fax: [phone]         [address] [place] [address] [address] [facility] [address]    Lizbeth Chen [facility] Assigned [place] Pharmacist   9/28/22     Phone: [phone] [address] [facility] [address]            My Care Plans  Self Management and Treatment Plan  Care Plan  Care Plan: General  Work on increasing strength and stamina for a year after reaching 100%     Problem: HP GENERAL PROBLEM     Goal: General  work on walking without the walker, and increasing my strength and stamina.  For at least 1 year after reaching 100%.     Start Date: 12/13/2021 Expected End Date: 6/13/2023    This Visit's Progress: 40% Recent Progress: 40%    Note:     Barriers: suffers from parkinson's  Strengths: engaged in care coordination  Patient expressed understanding of goal: yes  Action steps to achieve this goal:  1. I  will practice in the house walking without the walker. No longer using the walker as of 3/15/22. Completed action step  2. I will go outside without the walker when I feel strong enough and have increased my strength and stamina.  Continue to work on for at least a year.  3. I will frequently walk around inside the house to increase my strength and stamina.  Continue to work on for at least a year.                    Care Plan: General - Working on balance and items in the path,     Problem: HP GENERAL PROBLEM     Goal: General Goal - work on balance and avoiding items in the path     Start Date: 10/28/2022 Expected End Date: 10/28/2023    This Visit's Progress: 10% Recent Progress: 10%    Note:     Barriers: parkinson's  Strengths: engaged in care coordination  Patient expressed understanding of goal: yes   Action steps to achieve this goal:  1. I will work on walking around items in my path without losing my balance.  2. I will work on maintaining my balance when I walk through the house.  3. I will use my walker or cane as needed to maintain my balance.                           Action Plans on File:                       Advance Care Plans/Directives Type:   Advanced Directive - On File      My Medical and Care Information  Problem List   Patient Active Problem List   Diagnosis     Hemochromatosis     Hwppj-oiledc-ddhq disease, chronic (H)     Advanced directives, counseling/discussion     Polyneuropathy     Status post total knee replacements     Status post bone marrow transplant (H)     Hyperlipidemia with target LDL less than 100     Atrial fibrillation (H)     Chronic rhinitis     Gallstones without obstruction of gallbladder     Long-term (current) use of anticoagulants [Z79.01]     Thyroid nodule     Type 2 diabetes mellitus with stage 2 chronic kidney disease, without long-term current use of insulin (H)     History of Hodgkin's lymphoma     History of irradiation, presenting hazards to health      Primary pulmonary hypertension (H)     Hereditary hemochromatosis (H)     Oropharyngeal dysphagia     Articulation disorder     Personal history of PE (pulmonary embolism)     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    Care Coordination Start Date: 11/5/2021   Frequency of Care Coordination: monthly     Form Last Updated: 01/20/2023

## 2022-12-16 ENCOUNTER — ANTICOAGULATION THERAPY VISIT (OUTPATIENT)
Dept: ANTICOAGULATION | Facility: CLINIC | Age: 72
End: 2022-12-16

## 2022-12-16 ENCOUNTER — TRANSFERRED RECORDS (OUTPATIENT)
Dept: HEALTH INFORMATION MANAGEMENT | Facility: CLINIC | Age: 72
End: 2022-12-16

## 2022-12-16 DIAGNOSIS — Z79.01 LONG TERM CURRENT USE OF ANTICOAGULANT THERAPY: Primary | ICD-10-CM

## 2022-12-16 DIAGNOSIS — Z86.711 PERSONAL HISTORY OF PE (PULMONARY EMBOLISM): ICD-10-CM

## 2022-12-16 DIAGNOSIS — I48.0 PAROXYSMAL ATRIAL FIBRILLATION (H): ICD-10-CM

## 2022-12-16 DIAGNOSIS — I48.20 CHRONIC ATRIAL FIBRILLATION (H): ICD-10-CM

## 2022-12-16 LAB — INR (EXTERNAL): 2.5 (ref 0.9–1.1)

## 2022-12-16 NOTE — PROGRESS NOTES
ANTICOAGULATION MANAGEMENT     Haresh Rock 72 year old male is on warfarin with therapeutic INR result. (Goal INR 2.0-3.0)    Recent labs: (last 7 days)     12/16/22  0000   INR 2.5*       ASSESSMENT       Source(s): Chart Review and Patient/Caregiver Call       Warfarin doses taken: Warfarin taken as instructed    Diet: No new diet changes identified    New illness, injury, or hospitalization: No    Medication/supplement changes: None noted    Signs or symptoms of bleeding or clotting: No    Previous INR: Supratherapeutic    Additional findings: None       PLAN     Recommended plan for no diet, medication or health factor changes affecting INR     Dosing Instructions: Continue your current warfarin dose with next INR in 1 week       Summary  As of 12/16/2022    Full warfarin instructions:  5 mg every Fri; 3.75 mg all other days; Starting 12/16/2022   Next INR check:  12/23/2022             Telephone call with Haresh who verbalizes understanding and agrees to plan    Patient to recheck with home meter    Education provided:     Please call back if any changes to your diet, medications or how you've been taking warfarin    Plan made per [facility] anticoagulation protocol    Mayi Ayala, RN  Anticoagulation Clinic  12/16/2022    _______________________________________________________________________     Anticoagulation Episode Summary     Current INR goal:  2.0-3.0   TTR:  90.6 % (1 y)   Target end date:  Indefinite   Send INR reminders to:  [facility]    Indications    Long-term (current) use of anticoagulants [Z79.01] [Z79.01]  Atrial fibrillation (H) [I48.91]  Antiphospholipid antibody syndrome (H) (Resolved) [D68.61]  Personal history of DVT (deep vein thrombosis) (Resolved) [Z86.718]  Atrial fibrillation  unspecified type (H) (Resolved) [I48.91]  Paroxysmal atrial fibrillation (H) [I48.0]  Chronic atrial fibrillation (H) [I48.20]  Personal history of PE (pulmonary embolism) [Z86.711]            Comments:  JESSICA okay to manage by exception         Anticoagulation Care Providers     Provider Role Specialty Phone number    Anya Nowak MD Referring Family Medicine [phone]

## 2022-12-23 ENCOUNTER — TRANSFERRED RECORDS (OUTPATIENT)
Dept: HEALTH INFORMATION MANAGEMENT | Facility: CLINIC | Age: 72
End: 2022-12-23

## 2022-12-23 ENCOUNTER — DOCUMENTATION ONLY (OUTPATIENT)
Dept: ANTICOAGULATION | Facility: CLINIC | Age: 72
End: 2022-12-23

## 2022-12-23 DIAGNOSIS — I48.20 CHRONIC ATRIAL FIBRILLATION (H): ICD-10-CM

## 2022-12-23 DIAGNOSIS — Z79.01 LONG TERM CURRENT USE OF ANTICOAGULANT THERAPY: Primary | ICD-10-CM

## 2022-12-23 DIAGNOSIS — Z86.711 PERSONAL HISTORY OF PE (PULMONARY EMBOLISM): ICD-10-CM

## 2022-12-23 DIAGNOSIS — I48.0 PAROXYSMAL ATRIAL FIBRILLATION (H): ICD-10-CM

## 2022-12-23 LAB — INR (EXTERNAL): 2.7 (ref 0.9–1.1)

## 2022-12-23 NOTE — PROGRESS NOTES
ANTICOAGULATION  MANAGEMENT-Home Monitor Managed by Exception    Haresh EMILIE Rock 72 year old male is on warfarin with therapeutic INR result. (Goal INR 2.0-3.0)    Recent labs: (last 7 days)     12/23/22  0848   INR 2.7*         Previous INR was Therapeutic    Medication, diet, health changes since last INR:chart reviewed; none identified    Contacted within the last 12 weeks by phone on 12/16/22      MINNIE Hutson was NOT contacted regarding therapeutic result today per home monitoring policy manage by exception agreement.   Current warfarin dose is to be continued:     Summary  As of 12/23/2022    Full warfarin instructions:  5 mg every Fri; 3.75 mg all other days   Next INR check:  12/30/2022           ?   Allyn Doe RN  Anticoagulation Clinic  12/23/2022    _______________________________________________________________________     Anticoagulation Episode Summary     Current INR goal:  2.0-3.0   TTR:  91.0 % (1 y)   Target end date:  Indefinite   Send INR reminders to:  [facility]    Indications    Long-term (current) use of anticoagulants [Z79.01] [Z79.01]  Atrial fibrillation (H) [I48.91]  Antiphospholipid antibody syndrome (H) (Resolved) [D68.61]  Personal history of DVT (deep vein thrombosis) (Resolved) [Z86.718]  Atrial fibrillation  unspecified type (H) (Resolved) [I48.91]  Paroxysmal atrial fibrillation (H) [I48.0]  Chronic atrial fibrillation (H) [I48.20]  Personal history of PE (pulmonary embolism) [Z86.711]           Comments:  JESSICA rodas to manage by exception         Anticoagulation Care Providers     Provider Role Specialty Phone number    Anya Nowak MD Referring Family Medicine [phone]

## 2022-12-27 ENCOUNTER — PATIENT OUTREACH (OUTPATIENT)
Dept: SURGERY | Facility: CLINIC | Age: 72
End: 2022-12-27

## 2022-12-27 NOTE — PROGRESS NOTES
Patient Telephone Reminder Call    Date of call:  12/27/22  Phone numbers:  Cell number on file:    Telephone Information:   Mobile [phone]       Reached patient/confirmed appointment:  Yes  Appointment with:   Dr. Reg Cantu  Reason for visit:  Change tube  Remind patient that this visit is a consultation only, NO procedure:  No  Can this visit be changed to a video visit:  No

## 2022-12-28 ENCOUNTER — MEDICAL CORRESPONDENCE (OUTPATIENT)
Dept: HEALTH INFORMATION MANAGEMENT | Facility: CLINIC | Age: 72
End: 2022-12-28

## 2022-12-28 ENCOUNTER — OFFICE VISIT (OUTPATIENT)
Dept: SURGERY | Facility: CLINIC | Age: 72
End: 2022-12-28
Payer: COMMERCIAL

## 2022-12-28 VITALS
OXYGEN SATURATION: 99 % | HEIGHT: 71 IN | DIASTOLIC BLOOD PRESSURE: 72 MMHG | SYSTOLIC BLOOD PRESSURE: 126 MMHG | HEART RATE: 62 BPM | BODY MASS INDEX: 23.52 KG/M2 | WEIGHT: 168 LBS

## 2022-12-28 DIAGNOSIS — Z93.1 GASTROSTOMY TUBE IN PLACE (H): Primary | ICD-10-CM

## 2022-12-28 DIAGNOSIS — Z43.1 ATTENTION TO G-TUBE (H): ICD-10-CM

## 2022-12-28 PROCEDURE — 43763 RPLC GTUBE REVJ GSTRST TRC: CPT | Performed by: SURGERY

## 2022-12-28 ASSESSMENT — PAIN SCALES - GENERAL: PAINLEVEL: NO PAIN (0)

## 2022-12-28 NOTE — PROCEDURES
HISTORY OF PRESENT ILLNESS:  Haresh Rock, a 72-year-old male well known to Surgery Service, presents with a Jerrell-Key tube for exchange to avoid perforation of the balloon.  The patient otherwise is doing well.  The G-tube is working well.      PROCEDURE:  The patient was placed supine at the general surgery table.  The area of the left upper quadrant was carefully examined.  There was no evidence of granulation tissue.  The balloon was deflated and the existing Jerrell-Key tube was removed.  It was dark brown on the tip and the balloon was exhausted and needed to be removed.  A new Jerrell-Key tube, 20-Eritrean, 2.5 cm in length, was placed at the left upper quadrant through the G-tube and gastric contents were noted in the tube.  The balloon was inflated to 5 ml.  A dressing was applied.  The patient tolerated this G-tube exchange and the gastric content returned without difficulty.  No complications noted.

## 2022-12-28 NOTE — LETTER
"12/28/2022       RE: Haresh Rock  [address] [facility] [address]     Dear Colleague,    Thank you for referring your patient, Haresh Rock, to the [facility] [place] [facility]. Please see a copy of my visit note below.    HISTORY OF PRESENT ILLNESS:  Haresh Rock, a 72-year-old man who is well known to Surgery Clinic; he is having no difficulties at this time.  He does take his medications with some \"red\" Mountain Dew.  This helps with his reflux and he takes a small amount every day.  The patient also is taking his pills orally in the morning and he grinds up the tablets and pushes them in the G-tube in the afternoon and evening.  The patient has no new granulation tissue, no new problems and is here for tube exchange.    PAST MEDICAL HISTORY:  Past Medical History:   Diagnosis Date     Abnormal dopamine scan (DaTSCAN) 2020 11/04/2020    [phone] 11/3/2020   Narrative & Impression   Examination: Nuclear medicine DATscan for Dopamine Receptor Localization.   Examination: NM Brain Image Tomographic (SPECT) DATscan   Date: 11/3/2020 3:21 PM   Indication: Parkinsonism   Comparison: None   Additional Information: none   Interfering Medications: None   Technique:   The patient initially received 1 ml of Lugol's solution orally prior     Antiphospholipid antibody syndrome (H)     Ravi's, noted negative per testing re-done in 2008 in hematology note 01/13/2009     Articulation disorder 09/23/2020     Atrial fibrillation (H) 04/2011     Benign prostatic hyperplasia with urinary retention      Cirrhosis (H)      CKD (chronic kidney disease) stage 2, GFR 60-89 ml/min      Diabetes mellitus type II     steroid induced     DJD (degenerative joint disease) of knee     SHAWN, worse on right     DVT (deep venous thrombosis) (H)      ED (erectile dysfunction)      Gallbladder polyp 05/2010     Iznvs-Dsetxn-Mtuj Disease 2007    BMT     " Hemochromatosis      Hodgkin's disease NOS     5 cycles of ABVD in 2011     HTN (hypertension)      Hyperlipidaemia LDL goal <100      Multiple thyroid nodules     benign      Myeloproliferative disorder (H)     atypical, [facility]     Parkinson disease (H) 09/24/2020     PE (pulmonary embolism) 11/2004     Polyneuropathy      Pressure ulcer      Primary pulmonary hypertension (H)      Severe protein-calorie malnutrition (H) 11/10/2021     Small bowel obstruction (H) 10/29/2021     Thrombocytopenia (H) 06/08/2021        PAST SURGICAL HISTORY:  Past Surgical History:   Procedure Laterality Date     ANESTHESIA CARDIOVERSION  04/21/2011    Procedure:ANESTHESIA CARDIOVERSION; Surgeon:GENERIC ANESTHESIA PROVIDER; [facility]     ARTHROSCOPY KNEE RT/LT      LT     CATARACT IOL, RT/LT  2017     COLONOSCOPY  10/16/2012    Procedure: COLONOSCOPY;  Colonoscopy, screening;  Surgeon: Reg Feliciano MD;  Location: [facility]     COLONOSCOPY N/A 04/14/2021    Procedure: COLONOSCOPY;  Surgeon: Reg Holm MD;  Location: [facility] GI     ESOPHAGOSCOPY, GASTROSCOPY, DUODENOSCOPY (EGD), COMBINED N/A 10/07/2020    Procedure: ESOPHAGOGASTRODUODENOSCOPY, WITH BIOPSY;  Surgeon: Raul Sawyer DO;  Location: [facility] OR     H ABLATION FOCAL AFIB  03/05/2013    PVI     H ABLATION FOCAL AFIB  01/01/2018     HC BONE MARROW/STEM TRANSPLANT ALLOGENIC  05/08/2007     INSERT PORT VASCULAR ACCESS       IR GASTROSTOMY TUBE PERCUTANEOUS PLCMNT  10/25/2021     JOINT REPLACEMTN, KNEE RT/LT  03/28/2012    SHAWN     LAPAROSCOPY DIAGNOSTIC (GENERAL) N/A 10/29/2021    Procedure: LAPAROSCOPY, DIAGNOSTIC, TAKEDOWN OF MALPOSITIONED GASTROSTOMY TUBE, INSERTION OF GASTROSTOMY TUBE, SMALL BOWEL RESECTION X1, PRIMARY REPAIR OF SMALL BOWEL ENTEROTOMY, ABDOMINAL WASHOUT, TAP BLOCK;  Surgeon: Leif Finney DO;  Location: [facility] OR     PEG TUBE PLACEMENT  10/25/2021     VASECTOMY  1990        MEDICATIONS:  Current Outpatient Medications   Medication      amoxicillin (AMOXIL) 500 MG capsule     carbidopa-levodopa (SINEMET)  MG tablet     flecainide (TAMBOCOR) 50 MG tablet     loratadine (CLARITIN REDITABS) 10 MG ODT     metoprolol tartrate (LOPRESSOR) 25 MG tablet     NONFORMULARY     omeprazole (PRILOSEC) 20 MG DR capsule     PREVIDENT 5000 DRY MOUTH 1.1 % GEL topical gel     rosuvastatin (CRESTOR) 40 MG tablet     vitamin D3 (CHOLECALCIFEROL) 50 mcg (2000 units) tablet     warfarin ANTICOAGULANT (COUMADIN) 2.5 MG tablet     No current facility-administered medications for this visit.        ALLERGIES:  Allergies   Allergen Reactions     Seasonal Allergies         SOCIAL HISTORY:  Social History     Socioeconomic History     Marital status:      Spouse name: Angelica     Number of children: 2     Years of education: 21     Highest education level: None   Occupational History     Occupation:      Employer: [employer]     Employer: [employer]   Tobacco Use     Smoking status: Never     Smokeless tobacco: Never   Substance and Sexual Activity     Alcohol use: No     Drug use: No     Sexual activity: Not Currently     Partners: Female     Birth control/protection: None   Other Topics Concern     Parent/sibling w/ CABG, MI or angioplasty before 65F 55M? No   Social History Narrative     about 48 yrs        Wife has some health issues - back pain - second knee replaced    She had gastric bypass and a fib and bad mitral valve        Son in [place]    Daughter in [facility] with 8 yo son.         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[employer]        Born and raised in [facility]                FAMILY HISTORY:  Family History   Problem Relation Age of Onset     Hypertension Mother      Cerebrovascular Disease Mother      Breast Cancer Mother      Arrhythmia Brother      Prostate Cancer Brother      Arrhythmia Sister         supraventricular tachycardia     Thyroid Disease Sister      Other - See Comments Son         lives in [place] no kids and not .     Obsessive Compulsive Disorder Son      Depression Son      Eating Disorder Son         eats when depressed     Obesity Son      Thyroid Cancer Daughter         had thyroid removed     Bipolar Disorder Daughter      Other - See Comments Daughter         lives in [place] - single with one son 9  yrs     Thyroid Disease Daughter      Other - See Comments Sister      Alzheimer Disease Father      Diabetes Paternal Aunt      Gastrointestinal Disease Maternal Grandmother         colitis      Parkinsonism Other         2 cousins with parkinson     C.A.D. No family hx of         PHYSICAL EXAM:  Vital Signs: /72 (BP Location: Left arm, Patient Position: Sitting, Cuff Size: Adult Regular)   Pulse 62   Ht 1.803 m (5' 11\")   Wt 76.2 kg (168 lb)   SpO2 99%   BMI 23.43 kg/m    GEN: The patient was examined the standing and supine positions.   ABD: the abodmen is soft and non-tender. The G-tube was replaced (please see procedure note) without complications or issues.    EXT: Extremities are warm and well perfused.    IMPRESSION:  Haresh Rock is a 72-year-old man with a G-tube is doing very well with his nutrition.  Other issues, he has had some improvement in his ability to swallow the liquid and take his pills during the daytime.  At this point, the patient will continue with this approach and he will return to clinic in the next 6 months for replacement of the Jerrell-Key tube to avoid perforation of the balloon and complications associated with that.      Answers for HPI/ROS submitted by the patient on 12/27/2022  General Symptoms: No  Skin Symptoms: No  HENT Symptoms: No  EYE SYMPTOMS: No  HEART SYMPTOMS: No  LUNG SYMPTOMS: No  INTESTINAL SYMPTOMS: No  URINARY SYMPTOMS: No  REPRODUCTIVE SYMPTOMS: No  SKELETAL SYMPTOMS: No  BLOOD SYMPTOMS: No  NERVOUS SYSTEM SYMPTOMS: No  MENTAL HEALTH SYMPTOMS: No          Again, thank you for allowing me to participate in the care of your " patient.      Sincerely,    Reg Cantu MD

## 2022-12-28 NOTE — PATIENT INSTRUCTIONS
You met with Dr. Reg Cantu.      Today's visit instructions:    Return to see Dr. Cantu in approximately 6 months for another Jerrlel-Key tube exchange. Call at the Nurse Advice line listed below if you have questions or concerns in the interim.       If you have questions please contact Annabelle RN or Keya RN during regular clinic hours, Monday through Friday 7:30 AM - 4:00 PM, or you can contact us via [employer] at anytime.       If you have urgent needs after-hours, weekends, or holidays please call the hospital at [phone] and ask to speak with our on-call General Surgery Team.    Appointment [phone]  Nurse Advice (Annabelle or Keya): [phone]   Surgery Scheduler (Amber): [phone]  Fax: [phone]

## 2022-12-28 NOTE — PROGRESS NOTES
"HISTORY OF PRESENT ILLNESS:  Haresh Rock, a 72-year-old man who is well known to Surgery Clinic; he is having no difficulties at this time.  He does take his medications with some \"red\" Mountain Dew.  This helps with his reflux and he takes a small amount every day.  The patient also is taking his pills orally in the morning and he grinds up the tablets and pushes them in the G-tube in the afternoon and evening.  The patient has no new granulation tissue, no new problems and is here for tube exchange.    PAST MEDICAL HISTORY:  Past Medical History:   Diagnosis Date    Abnormal dopamine scan (DaTSCAN) 2020 11/04/2020    [phone] 11/3/2020   Narrative & Impression   Examination: Nuclear medicine DATscan for Dopamine Receptor Localization.   Examination: NM Brain Image Tomographic (SPECT) DATscan   Date: 11/3/2020 3:21 PM   Indication: Parkinsonism   Comparison: None   Additional Information: none   Interfering Medications: None   Technique:   The patient initially received 1 ml of Lugol's solution orally prior    Antiphospholipid antibody syndrome (H)     Ravi's, noted negative per testing re-done in 2008 in hematology note 01/13/2009    Articulation disorder 09/23/2020    Atrial fibrillation (H) 04/2011    Benign prostatic hyperplasia with urinary retention     Cirrhosis (H)     CKD (chronic kidney disease) stage 2, GFR 60-89 ml/min     Diabetes mellitus type II     steroid induced    DJD (degenerative joint disease) of knee     SHAWN, worse on right    DVT (deep venous thrombosis) (H)     ED (erectile dysfunction)     Gallbladder polyp 05/2010    Qaceu-Glbvwr-Eayz Disease 2007    BMT    Hemochromatosis     Hodgkin's disease NOS     5 cycles of ABVD in 2011    HTN (hypertension)     Hyperlipidaemia LDL goal <100     Multiple thyroid nodules     benign     Myeloproliferative disorder (H)     atypical, [facility]    Parkinson disease (H) 09/24/2020    PE (pulmonary embolism) 11/2004    Polyneuropathy     Pressure ulcer "     Primary pulmonary hypertension (H)     Severe protein-calorie malnutrition (H) 11/10/2021    Small bowel obstruction (H) 10/29/2021    Thrombocytopenia (H) 06/08/2021        PAST SURGICAL HISTORY:  Past Surgical History:   Procedure Laterality Date    ANESTHESIA CARDIOVERSION  04/21/2011    Procedure:ANESTHESIA CARDIOVERSION; Surgeon:GENERIC ANESTHESIA PROVIDER; [facility]    ARTHROSCOPY KNEE RT/LT      LT    CATARACT IOL, RT/LT  2017    COLONOSCOPY  10/16/2012    Procedure: COLONOSCOPY;  Colonoscopy, screening;  Surgeon: Reg Feliciano MD;  Location: [facility] OR    COLONOSCOPY N/A 04/14/2021    Procedure: COLONOSCOPY;  Surgeon: Reg Holm MD;  [facility]    ESOPHAGOSCOPY, GASTROSCOPY, DUODENOSCOPY (EGD), COMBINED N/A 10/07/2020    Procedure: ESOPHAGOGASTRODUODENOSCOPY, WITH BIOPSY;  Surgeon: Raul Sawyer DO;  Location: [facility] OR    H ABLATION FOCAL AFIB  03/05/2013    PVI    H ABLATION FOCAL AFIB  01/01/2018    HC BONE MARROW/STEM TRANSPLANT ALLOGENIC  05/08/2007    INSERT PORT VASCULAR ACCESS      IR GASTROSTOMY TUBE PERCUTANEOUS PLCMNT  10/25/2021    JOINT REPLACEMTN, KNEE RT/LT  03/28/2012    SHAWN    LAPAROSCOPY DIAGNOSTIC (GENERAL) N/A 10/29/2021    Procedure: LAPAROSCOPY, DIAGNOSTIC, TAKEDOWN OF MALPOSITIONED GASTROSTOMY TUBE, INSERTION OF GASTROSTOMY TUBE, SMALL BOWEL RESECTION X1, PRIMARY REPAIR OF SMALL BOWEL ENTEROTOMY, ABDOMINAL WASHOUT, TAP BLOCK;  Surgeon: Leif Finney DO;  Location: [facility] OR    PEG TUBE PLACEMENT  10/25/2021    VASECTOMY  1990        MEDICATIONS:  Current Outpatient Medications   Medication    amoxicillin (AMOXIL) 500 MG capsule    carbidopa-levodopa (SINEMET)  MG tablet    flecainide (TAMBOCOR) 50 MG tablet    loratadine (CLARITIN REDITABS) 10 MG ODT    metoprolol tartrate (LOPRESSOR) 25 MG tablet    NONFORMULARY    omeprazole (PRILOSEC) 20 MG DR capsule    PREVIDENT 5000 DRY MOUTH 1.1 % GEL topical gel    rosuvastatin (CRESTOR) 40 MG tablet     vitamin D3 (CHOLECALCIFEROL) 50 mcg (2000 units) tablet    warfarin ANTICOAGULANT (COUMADIN) 2.5 MG tablet     No current facility-administered medications for this visit.        ALLERGIES:  Allergies   Allergen Reactions    Seasonal Allergies         SOCIAL HISTORY:  Social History     Socioeconomic History    Marital status:      Spouse name: Angelica    Number of children: 2    Years of education: 21    Highest education level: None   Occupational History    Occupation:      Employer: [employer]     Employer: [employer]   Tobacco Use    Smoking status: Never    Smokeless tobacco: Never   Substance and Sexual Activity    Alcohol use: No    Drug use: No    Sexual activity: Not Currently     Partners: Female     Birth control/protection: None   Other Topics Concern    Parent/sibling w/ CABG, MI or angioplasty before 65F 55M? No   Social History Narrative     about 50 yrs        Wife has some health issues - back pain - second knee replaced    She had gastric bypass and a fib and bad mitral valve        Son in [place]    Daughter in [facility] with 10 yo son.         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        Born and raised in [facility]                FAMILY HISTORY:  Family History   Problem Relation Age of Onset    Hypertension Mother     Cerebrovascular Disease Mother     Breast Cancer Mother     Arrhythmia Brother     Prostate Cancer Brother     Arrhythmia Sister         supraventricular tachycardia    Thyroid Disease Sister     Other - See Comments Son         lives in [place]. no kids and not .    Obsessive Compulsive Disorder Son     Depression Son     Eating Disorder Son         eats when depressed    Obesity Son     Thyroid Cancer Daughter         had thyroid removed    Bipolar Disorder Daughter     Other - See Comments Daughter         lives in [place] - single with one son 9  yrs    Thyroid Disease Daughter     Other - See Comments  "Sister     Alzheimer Disease Father     Diabetes Paternal Aunt     Gastrointestinal Disease Maternal Grandmother         colitis     Parkinsonism Other         2 cousins with parkinson    C.A.D. No family hx of         PHYSICAL EXAM:  Vital Signs: /72 (BP Location: Left arm, Patient Position: Sitting, Cuff Size: Adult Regular)   Pulse 62   Ht 1.803 m (5' 11\")   Wt 76.2 kg (168 lb)   SpO2 99%   BMI 23.43 kg/m    GEN: The patient was examined the standing and supine positions.   ABD: the abodmen is soft and non-tender. The G-tube was replaced (please see procedure note) without complications or issues.    EXT: Extremities are warm and well perfused.    IMPRESSION:  Haresh Rock is a 72-year-old man with a G-tube is doing very well with his nutrition.  Other issues, he has had some improvement in his ability to swallow the liquid and take his pills during the daytime.  At this point, the patient will continue with this approach and he will return to clinic in the next 6 months for replacement of the Jerrell-Key tube to avoid perforation of the balloon and complications associated with that.      Answers for HPI/ROS submitted by the patient on 12/27/2022  General Symptoms: No  Skin Symptoms: No  HENT Symptoms: No  EYE SYMPTOMS: No  HEART SYMPTOMS: No  LUNG SYMPTOMS: No  INTESTINAL SYMPTOMS: No  URINARY SYMPTOMS: No  REPRODUCTIVE SYMPTOMS: No  SKELETAL SYMPTOMS: No  BLOOD SYMPTOMS: No  NERVOUS SYSTEM SYMPTOMS: No  MENTAL HEALTH SYMPTOMS: No      "

## 2022-12-28 NOTE — NURSING NOTE
"Chief Complaint   Patient presents with     RECHECK     Replace [place]-[employer] feeding tube       Vitals:    12/28/22 0907   BP: 126/72   BP Location: Left arm   Patient Position: Sitting   Cuff Size: Adult Regular   Pulse: 62   SpO2: 99%   Weight: 76.2 kg (168 lb)   Height: 1.803 m (5' 11\")       Body mass index is 23.43 kg/m .                          Devendra Zepeda, EMT    "

## 2022-12-30 ENCOUNTER — TRANSFERRED RECORDS (OUTPATIENT)
Dept: HEALTH INFORMATION MANAGEMENT | Facility: CLINIC | Age: 72
End: 2022-12-30

## 2022-12-30 ENCOUNTER — DOCUMENTATION ONLY (OUTPATIENT)
Dept: ANTICOAGULATION | Facility: CLINIC | Age: 72
End: 2022-12-30

## 2022-12-30 DIAGNOSIS — I48.20 CHRONIC ATRIAL FIBRILLATION (H): ICD-10-CM

## 2022-12-30 DIAGNOSIS — I48.0 PAROXYSMAL ATRIAL FIBRILLATION (H): ICD-10-CM

## 2022-12-30 DIAGNOSIS — Z86.711 PERSONAL HISTORY OF PE (PULMONARY EMBOLISM): ICD-10-CM

## 2022-12-30 DIAGNOSIS — D68.61 ANTIPHOSPHOLIPID ANTIBODY SYNDROME (H): ICD-10-CM

## 2022-12-30 DIAGNOSIS — Z79.01 LONG TERM CURRENT USE OF ANTICOAGULANT THERAPY: Primary | ICD-10-CM

## 2022-12-30 LAB — INR (EXTERNAL): 2.6 (ref 0.9–1.1)

## 2022-12-30 RX ORDER — WARFARIN SODIUM 2.5 MG/1
TABLET ORAL
Qty: 180 TABLET | Refills: 1 | Status: SHIPPED | OUTPATIENT
Start: 2022-12-30 | End: 2023-06-27

## 2022-12-30 NOTE — PROGRESS NOTES
ANTICOAGULATION  MANAGEMENT-Home Monitor Managed by Exception    Haresh EMILIE Rock 72 year old male is on warfarin with therapeutic INR result. (Goal INR 2.0-3.0)    Recent labs: (last 7 days)     12/30/22  0917   INR 2.6*         Previous INR was Therapeutic    Medication, diet, health changes since last INR:chart reviewed; none identified    Contacted within the last 12 weeks by phone on 12/16/22      MINNIE Hutson was NOT contacted regarding therapeutic result today per home monitoring policy manage by exception agreement.   Current warfarin dose is to be continued:     Summary  As of 12/30/2022    Full warfarin instructions:  5 mg every Fri; 3.75 mg all other days   Next INR check:  1/6/2023           ?   Macrina Preston RN  Anticoagulation Clinic  12/30/2022    _______________________________________________________________________     Anticoagulation Episode Summary     Current INR goal:  2.0-3.0   TTR:  91.0 % (1 y)   Target end date:  Indefinite   Send INR reminders to:  [facility]    Indications    Long-term (current) use of anticoagulants [Z79.01] [Z79.01]  Atrial fibrillation (H) [I48.91]  Antiphospholipid antibody syndrome (H) (Resolved) [D68.61]  Personal history of DVT (deep vein thrombosis) (Resolved) [Z86.718]  Atrial fibrillation  unspecified type (H) (Resolved) [I48.91]  Paroxysmal atrial fibrillation (H) [I48.0]  Chronic atrial fibrillation (H) [I48.20]  Personal history of PE (pulmonary embolism) [Z86.711]           Comments:  JESSICA rodas to manage by exception         Anticoagulation Care Providers     Provider Role Specialty Phone number    Anya Nowak MD Referring Family Medicine [phone]

## 2023-01-03 ENCOUNTER — OFFICE VISIT (OUTPATIENT)
Dept: UROLOGY | Facility: CLINIC | Age: 73
End: 2023-01-03
Payer: COMMERCIAL

## 2023-01-03 VITALS — OXYGEN SATURATION: 100 % | SYSTOLIC BLOOD PRESSURE: 137 MMHG | DIASTOLIC BLOOD PRESSURE: 70 MMHG | HEART RATE: 67 BPM

## 2023-01-03 DIAGNOSIS — R33.8 BENIGN PROSTATIC HYPERPLASIA WITH URINARY RETENTION: Primary | ICD-10-CM

## 2023-01-03 DIAGNOSIS — N40.1 BENIGN PROSTATIC HYPERPLASIA WITH URINARY RETENTION: Primary | ICD-10-CM

## 2023-01-03 DIAGNOSIS — N45.1 EPIDIDYMITIS: ICD-10-CM

## 2023-01-03 PROCEDURE — 51798 US URINE CAPACITY MEASURE: CPT | Performed by: UROLOGY

## 2023-01-03 PROCEDURE — 99213 OFFICE O/P EST LOW 20 MIN: CPT | Mod: 25 | Performed by: UROLOGY

## 2023-01-03 NOTE — PROGRESS NOTES
Chief Complaint   Patient presents with     JIHAN PHAN Pranav is a 72 year old male who presents today for follow up of   Chief Complaint   Patient presents with     RECHECK       Zhane [age]-year-old male with history of urinary retention status post TURP previously.  He has no major urinary problems.  Recently he was seen in the emergency room with left scrotal pain and was diagnosed with epididymitis.  Scrotal ultrasound showed evidence of that.  He was treated antibiotics.  Since then his symptoms have resolved.    Current Outpatient Medications   Medication Sig Dispense Refill     amoxicillin (AMOXIL) 500 MG capsule Take 4 capsules (2,000 mg) by mouth once Take 1 hour prior to dental procedure       carbidopa-levodopa (SINEMET)  MG tablet 1 tab 3/day at 8am, 2pm and 8pm 270 tablet 3     flecainide (TAMBOCOR) 50 MG tablet Take 1 tablet (50 mg) by mouth 2 times daily 180 tablet 1     loratadine (CLARITIN REDITABS) 10 MG ODT Take 10 mg by mouth daily       metoprolol tartrate (LOPRESSOR) 25 MG tablet 0.5 tablets (12.5 mg) by Oral or Feeding Tube route 2 times daily (crush tablet) 90 tablet 3     NONFORMULARY Abbott osmolyte feedings 2 cans 3/day       omeprazole (PRILOSEC) 20 MG DR capsule TAKE 1 CAPSULE BY MOUTH EVERY MORNING. 90 capsule 0     PREVIDENT 5000 DRY MOUTH 1.1 % GEL topical gel Apply to affected area daily   3     rosuvastatin (CRESTOR) 40 MG tablet Take 1 tablet (40 mg) by mouth At Bedtime 90 tablet 3     vitamin D3 (CHOLECALCIFEROL) 50 mcg (2000 units) tablet Take 1 tablet (50 mcg) by mouth daily       warfarin ANTICOAGULANT (COUMADIN) 2.5 MG tablet Take 5mg Friday & 3.75mg all the other days or as directed by [facility] 180 tablet 1     Allergies   Allergen Reactions     Seasonal Allergies       Past Medical History:   Diagnosis Date     Abnormal dopamine scan (DaTSCAN) 2020 11/04/2020    [phone] 11/3/2020   Narrative & Impression   Examination: Nuclear medicine  DATscan for Dopamine Receptor Localization.   Examination: NM Brain Image Tomographic (SPECT) DATscan   Date: 11/3/2020 3:21 PM   Indication: Parkinsonism   Comparison: None   Additional Information: none   Interfering Medications: None   Technique:   The patient initially received 1 ml of Lugol's solution orally prior     Antiphospholipid antibody syndrome (H)     Ravi's, noted negative per testing re-done in 2008 in hematology note 01/13/2009     Articulation disorder 09/23/2020     Atrial fibrillation (H) 04/2011     Benign prostatic hyperplasia with urinary retention      Cirrhosis (H)      CKD (chronic kidney disease) stage 2, GFR 60-89 ml/min      Diabetes mellitus type II     steroid induced     DJD (degenerative joint disease) of knee     SHAWN, worse on right     DVT (deep venous thrombosis) (H)      ED (erectile dysfunction)      Gallbladder polyp 05/2010     Baptm-Rvlopq-Qxsn Disease 2007    BMT     Hemochromatosis      Hodgkin's disease NOS     5 cycles of ABVD in 2011     HTN (hypertension)      Hyperlipidaemia LDL goal <100      Multiple thyroid nodules     benign      Myeloproliferative disorder (H)     atypical, [facility]     Parkinson disease (H) 09/24/2020     PE (pulmonary embolism) 11/2004     Polyneuropathy      Pressure ulcer      Primary pulmonary hypertension (H)      Severe protein-calorie malnutrition (H) 11/10/2021     Small bowel obstruction (H) 10/29/2021     Thrombocytopenia (H) 06/08/2021     Past Surgical History:   Procedure Laterality Date     ANESTHESIA CARDIOVERSION  04/21/2011    Procedure:ANESTHESIA CARDIOVERSION; Surgeon:GENERIC ANESTHESIA PROVIDER; Location:[facility]     ARTHROSCOPY KNEE RT/LT      LT     CATARACT IOL, RT/LT  2017     COLONOSCOPY  10/16/2012    Procedure: COLONOSCOPY;  Colonoscopy, screening;  Surgeon: Reg Feliciano MD;  Location: [facility]     COLONOSCOPY N/A 04/14/2021    Procedure: COLONOSCOPY;  Surgeon: Reg Holm MD;  Location: [facility]      ESOPHAGOSCOPY, GASTROSCOPY, DUODENOSCOPY (EGD), COMBINED N/A 10/07/2020    Procedure: ESOPHAGOGASTRODUODENOSCOPY, WITH BIOPSY;  Surgeon: Raul Sawyer DO;  Location: [facility] OR     H ABLATION FOCAL AFIB  03/05/2013    PVI     H ABLATION FOCAL AFIB  01/01/2018     HC BONE MARROW/STEM TRANSPLANT ALLOGENIC  05/08/2007     INSERT PORT VASCULAR ACCESS       IR GASTROSTOMY TUBE PERCUTANEOUS PLCMNT  10/25/2021     JOINT REPLACEMTN, KNEE RT/LT  03/28/2012    SHAWN     LAPAROSCOPY DIAGNOSTIC (GENERAL) N/A 10/29/2021    Procedure: LAPAROSCOPY, DIAGNOSTIC, TAKEDOWN OF MALPOSITIONED GASTROSTOMY TUBE, INSERTION OF GASTROSTOMY TUBE, SMALL BOWEL RESECTION X1, PRIMARY REPAIR OF SMALL BOWEL ENTEROTOMY, ABDOMINAL WASHOUT, TAP BLOCK;  Surgeon: Leif Finney DO;  Location: [facility]     PEG TUBE PLACEMENT  10/25/2021     VASECTOMY  1990     Family History   Problem Relation Age of Onset     Hypertension Mother      Cerebrovascular Disease Mother      Breast Cancer Mother      Arrhythmia Brother      Prostate Cancer Brother      Arrhythmia Sister         supraventricular tachycardia     Thyroid Disease Sister      Other - See Comments Son         lives in [place]. no kids and not .     Obsessive Compulsive Disorder Son      Depression Son      Eating Disorder Son         eats when depressed     Obesity Son      Thyroid Cancer Daughter         had thyroid removed     Bipolar Disorder Daughter      Other - See Comments Daughter         lives in [place] - single with one son 9  yrs     Thyroid Disease Daughter      Other - See Comments Sister      Alzheimer Disease Father      Diabetes Paternal Aunt      Gastrointestinal Disease Maternal Grandmother         colitis      Parkinsonism Other         2 cousins with parkinson     C.A.D. No family hx of      Social History     Socioeconomic History     Marital status:      Spouse name: Angelica     Number of children: 2     Years of education: 21     Highest  education level: None   Occupational History     Occupation:      Employer: [employer]     Employer: DISABLED   Tobacco Use     Smoking status: Never     Smokeless tobacco: Never   Substance and Sexual Activity     Alcohol use: No     Drug use: No     Sexual activity: Not Currently     Partners: Female     Birth control/protection: None   Other Topics Concern     Parent/sibling w/ CABG, MI or angioplasty before 65F 55M? No   Social History Narrative     about 50 yrs        Wife has some health issues - back pain - second knee replaced    She had gastric bypass and a fib and bad mitral valve        Son in [place]    Daughter in [facility] with 8 yo son.         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[employer]        Born and raised in [place]                REVIEW OF SYSTEMS  =================  C: NEGATIVE for fever, chills, change in weight  I: NEGATIVE for worrisome rashes, moles or lesions  E/M: NEGATIVE for ear, mouth and throat problems  R: NEGATIVE for significant cough or SHORTNESS OF BREATH  CV:  NEGATIVE for chest pain, palpitations or peripheral edema  GI: NEGATIVE for nausea, abdominal pain, heartburn, or change in bowel habits  NEURO: NEGATIVE numbness/weakness  : see HPI  PSYCH: NEGATIVE depression/anxiety  LYmph: no new enlarged lymph nodes  Ortho: no new trauma/movements    Physical Exam:  /70 (BP Location: Right arm, Patient Position: Chair, Cuff Size: Adult Regular)   Pulse 67   SpO2 100%    Patient is pleasant, in no acute distress, good general condition.  Lung: no evidence of respiratory distress    Abdomen: Soft, nondistended, non tender. No masses. No rebound or guarding.   Exam: Penis no discharge.  Testes no masses.  Epididymis feels normal.  Nontender.  No scrotal skin lesion.  Skin: Warm and dry.  No redness.  Psych: normal mood and affect  Neuro: alert and oriented  Musculaskeletal: moving all extremities    Assessment/Plan:   (N40.1,  R33.8)  Benign prostatic hyperplasia with urinary retention  (primary encounter diagnosis)  Comment: Bladder scan showed minimal residual urine today.  Plan: reassurance  (N45.1) Epididymitis  Comment: Treated  Plan: reassurance

## 2023-01-06 ENCOUNTER — TRANSFERRED RECORDS (OUTPATIENT)
Dept: HEALTH INFORMATION MANAGEMENT | Facility: CLINIC | Age: 73
End: 2023-01-06

## 2023-01-06 ENCOUNTER — DOCUMENTATION ONLY (OUTPATIENT)
Dept: ANTICOAGULATION | Facility: CLINIC | Age: 73
End: 2023-01-06

## 2023-01-06 DIAGNOSIS — I48.20 CHRONIC ATRIAL FIBRILLATION (H): ICD-10-CM

## 2023-01-06 DIAGNOSIS — Z86.711 PERSONAL HISTORY OF PE (PULMONARY EMBOLISM): ICD-10-CM

## 2023-01-06 DIAGNOSIS — Z79.01 LONG TERM CURRENT USE OF ANTICOAGULANT THERAPY: Primary | ICD-10-CM

## 2023-01-06 DIAGNOSIS — I48.0 PAROXYSMAL ATRIAL FIBRILLATION (H): ICD-10-CM

## 2023-01-06 LAB — INR (EXTERNAL): 2.8 (ref 0.9–1.1)

## 2023-01-06 NOTE — PROGRESS NOTES
ANTICOAGULATION  MANAGEMENT-Home Monitor Managed by Exception    Haresh EMILIE Rock 72 year old male is on warfarin with therapeutic INR result. (Goal INR 2.0-3.0)    Recent labs: (last 7 days)     01/06/23  0000   INR 2.8*         Previous INR was Therapeutic    Medication, diet, health changes since last INR:chart reviewed; none identified    Contacted within the last 12 weeks by phone on 12/16/2022      MINNIE Hutson was NOT contacted regarding therapeutic result today per home monitoring policy manage by exception agreement.   Current warfarin dose is to be continued:     Summary  As of 1/6/2023    Full warfarin instructions:  5 mg every Fri; 3.75 mg all other days   Next INR check:  1/13/2023           ?   Mayi Ayala RN  Anticoagulation Clinic  1/6/2023    _______________________________________________________________________     Anticoagulation Episode Summary     Current INR goal:  2.0-3.0   TTR:  91.0 % (1 y)   Target end date:  Indefinite   Send INR reminders to:  [facility]    Indications    Long-term (current) use of anticoagulants [Z79.01] [Z79.01]  Atrial fibrillation (H) [I48.91]  Antiphospholipid antibody syndrome (H) (Resolved) [D68.61]  Personal history of DVT (deep vein thrombosis) (Resolved) [Z86.718]  Atrial fibrillation  unspecified type (H) (Resolved) [I48.91]  Paroxysmal atrial fibrillation (H) [I48.0]  Chronic atrial fibrillation (H) [I48.20]  Personal history of PE (pulmonary embolism) [Z86.711]           Comments:  JESSICA rodas to manage by exception         Anticoagulation Care Providers     Provider Role Specialty Phone number    Anya Nowak MD Referring Family Medicine [phone]

## 2023-01-09 DIAGNOSIS — G20.A1 PARKINSON DISEASE (H): ICD-10-CM

## 2023-01-09 NOTE — TELEPHONE ENCOUNTER
[facility] Call Center    Phone Message    May a detailed message be left on voicemail: yes     Reason for Call: Medication Refill Request    Has the patient contacted the pharmacy for the refill? Yes   Name of medication being requested: carbidopa-levodopa (SINEMET)  MG tablet  Provider who prescribed the medication:   Pharmacy: [facility]   Date medication is needed:     Patient called, he is asking for care team to send a refill of his carbidopa-levodopa (SINEMET)  MG tablet [facility]. Per pt he has only 5 days left, please call patient to confirm    Action Taken: Message routed to:  [facility] neurology    Travel Screening: Not Applicable

## 2023-01-10 RX ORDER — CARBIDOPA AND LEVODOPA 25; 100 MG/1; MG/1
TABLET ORAL
Qty: 270 TABLET | Refills: 1 | Status: SHIPPED | OUTPATIENT
Start: 2023-01-10 | End: 2023-01-23

## 2023-01-13 ENCOUNTER — DOCUMENTATION ONLY (OUTPATIENT)
Dept: ANTICOAGULATION | Facility: CLINIC | Age: 73
End: 2023-01-13

## 2023-01-13 ENCOUNTER — TRANSFERRED RECORDS (OUTPATIENT)
Dept: HEALTH INFORMATION MANAGEMENT | Facility: CLINIC | Age: 73
End: 2023-01-13
Payer: COMMERCIAL

## 2023-01-13 DIAGNOSIS — I48.20 CHRONIC ATRIAL FIBRILLATION (H): ICD-10-CM

## 2023-01-13 DIAGNOSIS — Z86.711 PERSONAL HISTORY OF PE (PULMONARY EMBOLISM): ICD-10-CM

## 2023-01-13 DIAGNOSIS — Z79.01 LONG TERM CURRENT USE OF ANTICOAGULANT THERAPY: Primary | ICD-10-CM

## 2023-01-13 DIAGNOSIS — I48.0 PAROXYSMAL ATRIAL FIBRILLATION (H): ICD-10-CM

## 2023-01-13 LAB — INR (EXTERNAL): 3 (ref 0.9–1.1)

## 2023-01-13 NOTE — PROGRESS NOTES
ANTICOAGULATION  MANAGEMENT-Home Monitor Managed by Exception    Haresh EMILIE Rock 72 year old male is on warfarin with therapeutic INR result. (Goal INR 2.0-3.0)    Recent labs: (last 7 days)     01/13/23  0901   INR 3.0*         Previous INR was Therapeutic    Medication, diet, health changes since last INR:chart reviewed; none identified    Contacted within the last 12 weeks by phone on  12/16/2022           MINNIE Hutson was NOT contacted regarding therapeutic result today per home monitoring policy manage by exception agreement.   Current warfarin dose is to be continued:     Summary  As of 1/13/2023    Full warfarin instructions:  5 mg every Fri; 3.75 mg all other days   Next INR check:  1/20/2023           ?   Blanca Rodriguez RN  Anticoagulation Clinic  1/13/2023    _______________________________________________________________________     Anticoagulation Episode Summary     Current INR goal:  2.0-3.0   TTR:  91.0 % (1 y)   Target end date:  Indefinite   Send INR reminders to:  [facility]    Indications    Long-term (current) use of anticoagulants [Z79.01] [Z79.01]  Atrial fibrillation (H) [I48.91]  Antiphospholipid antibody syndrome (H) (Resolved) [D68.61]  Personal history of DVT (deep vein thrombosis) (Resolved) [Z86.718]  Atrial fibrillation  unspecified type (H) (Resolved) [I48.91]  Paroxysmal atrial fibrillation (H) [I48.0]  Chronic atrial fibrillation (H) [I48.20]  Personal history of PE (pulmonary embolism) [Z86.711]           Comments:  JESSICA rodas to manage by exception         Anticoagulation Care Providers     Provider Role Specialty Phone number    Anya Nowak MD Referring Family Medicine [phone]

## 2023-01-16 NOTE — PROGRESS NOTES
VIDEO VISIT    Date of Visit: January 23, 2023  Name: Haresh Rock  Date of Birth 1950  MRN: [id number]     [address] [place] [id number] [phone] (M)  [phone] (H)  [email or web address]     - sheyla Ramirez - son  Gemma - daughter  Sheyla Rock  [phone]     [email or web address],   [email or web address]  [email or web address]     [phone]     Sitting in a lazy boy     [phone]    Assessment:  (G20) Parkinson disease (H)  (primary encounter diagnosis)    Dopamine scan - (datscan)  11/3/2020  A presynaptic dopaminergic deficit is demonstrated bilaterally.    Review of diagnosis    parkinson  Avoidance of dopamine blockers yes  Motor complication review  -   Review of Impulse control disorders no  Review of surgical or medication options yes    Avoidance of dopamine blockers   Not ta josiane    Motor complication review   no    Review of Impulse control disorders   denies    Review of surgical or medication options   reviewed    Gait/Balance/Falls   No falls    Exercise/Therapy performed/offered   Doing [employer] snow    Cognitive/Driving   Driving - not problems  No cognitive problems - no changes       Mood   Daughter gemma with bipolar - 2 or 3 miles away   Son july with mood issues  2 or 3 miles away   He has some depression    Living with Sheyla - has ongoing back pain - getting a tooth extracted this afternoon;  Constipation, heart and bladder issues.   Constipation or diarrhea - has problems controlling her bowels    Hallucinations/delusions   No hallucinations    Sleep   Pseudoeph-doxylamine DM APAP nyquil - not using  Sleep pretty well  Nocturia 2/noc  Falls asleep during the day  Napping during the day  Falls asleep when watching news  Falls asleep during the afternoon  Up 3/noc for bathroom   Midnight goes to bed and up at 8am  Naps during day   Not talking in sleep or snoring.  No unusual behaviors  Wakes up before 3am, 5/6a and 7am    Bladder/Renal/Prostate/Gyn/Other  Renal function.    May have stage 2 disease.      Prostate - denies problems. He has has nocturia couple times per night.   Denies elevated psa     Prostate surgery; urinating better     Dutasteride avodart 0.5mg - off   Tamsulosin flomax 0.4mg - stopped  Had low blood pressure and fluids     July 5th ED visit and got catheter  Surgery yesterday laser and should get catheter out  Dr Jose G Hawley    GI/Constipation/GERD   Swallow study 9/22/2020  1. Multiple events of deep laryngeal penetration with thinner barium consistencies with intermittent aspiration.      Gallstones but has not gallbladder removed.  Had a gallbladder attack x 1 and is not on actigall     Bisacodyl dulcolax 5mg EC tablet - not using   Calcium carbonate tums 500mg ?  Nutritional supplement - 1 bottle per day  - not using   Pantoprazole protonix 20mg - not using  Polythyelene glycol miralax - not using   Prevident 5000 dry mouth - using  Senna-docusate senexon-S senokot-S  - no  Weight loss better with feeding tube  No constipation  Has some seepage  When urinates he has some stool     Had bloating and blockage with the feeding tube placement and had emergency surgery   10/29/2021 Scan showed problems     Feeding tube has helped him gain 20 lbs - he gets feedings 1 hour after medications so 9am, 3pm and 9pm     Takes parkinson medications by mouth for the first two doses and crush the last one of the day through the feeding tube  Schedule is 8am, 2pm and 8pm    With his fecal seepage discussed the use of a bidet or adapting his toilet with one - h e has a raised toilet seat so not clear what needs to be done. Consider seeing Occupational therapy.     Formula switched from abbott to nestle  161 or so lbs      ENDO/Lipid/DM/Bone density/Thyroid  Cholesterol  Rosuvastatin crestor 40mg   Vitamin D3 cholecalciferol 50 mcg 2000 units  Had diabetes in the past from prednisone  And this may have resolved  May have a thyroid nodule.  TSH was normal.   A1c was  5.5        Cardio/heart/Hyper or Hypotensive   Flecainide tambocor 50mg twice daily  Metoprolol succinate ER Toprol XL 25mg 24 hr tablet  Blood pressure has been okay   122/63  Low bp has cleared up  MRI of heart next week    Vision/Dry Eyes/Cataracts/Glaucoma/Macular   Wears glasses  Eye - left eye has been poor since age 2 due to a lazy eye - amblyopia  Right eye post cataract surgery had an artificial lens put in and does not wear glasses till the evening        Heme/Anticoagulation/Antiplatelet/Anemia/Other  Folic acid folvite 1mg - not taking   Anticoagulated  Warfarin     Hereditary hemochromatosis gene - gene that increases his risk and has not had problems with this. Had had phlebotomies x 2 in the past.      AYUSH Torres     History of pulmonary embolii - back in 2004. This was related to blood clotting problems.   May have had a DVT. Reportedly has had antiphospholipid antibody. He is on coumadin.      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 Had a transplant from Dr. 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  No problems since then.      Graft vs host     He has high sed rate and will be seeing ID next week   crp was 12.8  Sed rate was 90  8/3/2021  Working with Marcelo Jacques    ENT/Resp  Hodgkin's lymphoma in the past 2011 and had chemotherapy for this and followed with CT scan and reportedly this is gone.      Pulmonary hypertension in the past.   Fluticasone flonase 50 mcg/act nasal spray  Loratadine claritin 10mg    CT of lung next      Hearing. Feels he has wax in his left ear and partial problem with the right ear. It is variable problem. Has used ear wax removal.     Feeding tube has helped him gain 20 lbs - he gets feedings 1 hour after medications so 9am, 3pm and 9pm     Skin/Cancer/Seborrhea/other  No skin cancer    Musculoskeletal/Pain/Headache  bilateral knee replacements.    Other:  He reports a neuropathy and that his foot drop has  resolved. 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    Medications      8a   2p  3p 8p  9p   Acetaminophen tylenol 500mg no             Amoxicillin amoxil 500mg dentist             Calcium carbonate tums 500mg no             Carbidopa/levodopa Sinemet 25/100 1 po    1po   Crush 1     Dutasteride avodart 0.5mg no             Flecainide tambocor 50mg 1       1     Fluticasone flonase 50 mcg/act spray prn             Loratadine claritin 10mg  1             Metoprolol tartrate 25mg  1/2 po       1/2 crush     MVI centrum 1             Nonformulary feeding tube osmolyte   2 cans   2 cans   2 cans   Omeprazole prilosec 20mg 1             Prevident 5000 dry mouth using             Rosuvastatin crestor 40mg         1     Senna-docusate 8.6-50 no             Starch-maltodextrin thick-it no             Vitamin D3 cholecalciferol 50 mcg 2000 gel  1             Warfarin anticoagulant coumadin 2.5mg         schedule                  Plan:  No constipation - bowels are a bit loose  Getting feedings - using nestle product  He has reflux -  Has a pillow but not a pillow wedge  He has to brush his teeth several times daily  Avoiding water pik because of difficulty handling secretions   Teeth gets crusty - tartar    May want to talk with his pcp about the omeprazole - should he be taking a different product or/and higher dose  Famotidine (pepcid) or Pantoprazole (protonix)    Overall his parkinson is stable  No falls  [facility]illed his sinemet that is [facility]    Ongoing nocturia - had tried medication in the past that did not help  Not sure if he has been on mirabegron/myrbetriq, ubegron/gemtesa  Has seen Dr. Hawley jan 2023   Benign prostatic hyperplasia with urinary retention  (primary encounter diagnosis)  Comment: Bladder scan showed minimal residual urine today.  (N45.1) Epididymitis    Blood pressure has been stable  Anticoagulated   Using flecainide for his heart    Ongoing hematological care with  "Dr. JUNIOR    Return back           Medical Decision Making:  #  Chronic progressive medical conditions addressed  Parkinson and swallowing issues  Review and/or interpretation of unique test or documentation from a provider outside of neurology no   Independent historian provided additional details  no   Prescription drug management and review of potential side effects and/or monitoring for side effects  yes   Health impacted by social determinants of health  no    I have reviewed the note as documented above.  This accurately captures the substance of my conversation with the patient and total time spent preparing for visit, executing visit and completing visit on the day of the visit:  30 minutes.     Ángel Hill MD      ------------------------------------------------------------------------------------------------------------------------------------------------------------------------    Video-Visit Details    The patient has been notified of following:     \"After a review of the patient s situation, this visit was changed from an in-person visit to a video visit to reduce the risk of COVID-19 exposure.   The patient is being evaluated via a billable video visit.\"    \"This video visit will be conducted via a call between you and your physician/provider. We have found that certain health care needs can be provided without the need for an in-person physical exam.  This service lets us provide the care you need with a video conversation.  If a prescription is necessary we can send it directly to your pharmacy.  If lab work is needed we can place an order for that and you can then stop by our lab to have the test done at a later time.    If during the course of the call the physician/provider feels a video visit is not appropriate, you will not be charged for this service.\"     Patient has given verbal consent for Video visit? Yes    Patient would like the video invitation sent by:     Type of service:  Video " Visit    Video Start Time:     Video End Time (time video stopped):     Duration:  minutes - see above    Originating Location (pt. Location):     Distant Location (provider location):  [facility] [place]     Mode of Communication:  Video Conference via TinyTap (and if not possible then doximity)      Ángel Hill MD      --------------------------------------------------------------------------------------------------------------    Haresh Rock is a 72 year old male who is being evaluated via a billable video visit.      Charts reviewed  Consult from  Images reviewed        I have reviewed and updated the patient's Past Medical History, Social History, Family History and Medication List.    ALLERGIES  Seasonal allergies    Lasts visit details if there was a last visit:       14 Review of systems  are negative except for   Patient Active Problem List   Diagnosis     Hemochromatosis     Oykkg-rhgtic-zeok disease, chronic (H)     Advanced directives, counseling/discussion     Polyneuropathy     Status post total knee replacements     Status post bone marrow transplant (H)     Hyperlipidemia with target LDL less than 100     Atrial fibrillation (H)     Chronic rhinitis     Gallstones without obstruction of gallbladder     Long-term (current) use of anticoagulants [Z79.01]     Thyroid nodule     Type 2 diabetes mellitus with stage 2 chronic kidney disease, without long-term current use of insulin (H)     History of Hodgkin's lymphoma     History of irradiation, presenting hazards to health     Primary pulmonary hypertension (H)     Hereditary hemochromatosis (H)     Oropharyngeal dysphagia     Articulation disorder     Personal history of PE (pulmonary embolism)     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)     Abnormal dopamine scan (DaTSCAN) 2020     ACP (advance care planning)     Thrombocytopenia (H)     Paroxysmal atrial fibrillation (H)     Benign prostatic hyperplasia with  urinary retention     Constipation     Parkinson's disease (H)     Gastrostomy tube in place (H)     Chronic atrial fibrillation (H)        Allergies   Allergen Reactions     Seasonal Allergies      Past Surgical History:   Procedure Laterality Date     ANESTHESIA CARDIOVERSION  04/21/2011    Procedure:ANESTHESIA CARDIOVERSION; Surgeon:GENERIC ANESTHESIA PROVIDER; Location:[facility] OR     ARTHROSCOPY KNEE RT/LT      LT     CATARACT IOL, RT/LT  2017     COLONOSCOPY  10/16/2012    Procedure: COLONOSCOPY;  Colonoscopy, screening;  Surgeon: Reg Feliciano MD;  Location: [facility]     COLONOSCOPY N/A 04/14/2021    Procedure: COLONOSCOPY;  Surgeon: Reg Holm MD;  Location: [facility]     ESOPHAGOSCOPY, GASTROSCOPY, DUODENOSCOPY (EGD), COMBINED N/A 10/07/2020    Procedure: ESOPHAGOGASTRODUODENOSCOPY, WITH BIOPSY;  Surgeon: Raul Sawyer DO;  Location: [facility] OR     H ABLATION FOCAL AFIB  03/05/2013    PVI     H ABLATION FOCAL AFIB  01/01/2018     HC BONE MARROW/STEM TRANSPLANT ALLOGENIC  05/08/2007     INSERT PORT VASCULAR ACCESS       IR GASTROSTOMY TUBE PERCUTANEOUS PLCMNT  10/25/2021     JOINT REPLACEMTN, KNEE RT/LT  03/28/2012    SHAWN     LAPAROSCOPY DIAGNOSTIC (GENERAL) N/A 10/29/2021    Procedure: LAPAROSCOPY, DIAGNOSTIC, TAKEDOWN OF MALPOSITIONED GASTROSTOMY TUBE, INSERTION OF GASTROSTOMY TUBE, SMALL BOWEL RESECTION X1, PRIMARY REPAIR OF SMALL BOWEL ENTEROTOMY, ABDOMINAL WASHOUT, TAP BLOCK;  Surgeon: Leif Finney DO;  Location: [facility] OR     PEG TUBE PLACEMENT  10/25/2021     VASECTOMY  1990     Past Medical History:   Diagnosis Date     Abnormal dopamine scan (DaTSCAN) 2020 11/04/2020    [phone] 11/3/2020   Narrative & Impression   Examination: Nuclear medicine DATscan for Dopamine Receptor Localization.   Examination: NM Brain Image Tomographic (SPECT) DATscan   Date: 11/3/2020 3:21 PM   Indication: Parkinsonism   Comparison: None   Additional Information: none   Interfering Medications: None    Technique:   The patient initially received 1 ml of Lugol's solution orally prior     Antiphospholipid antibody syndrome (H)     Ravi's, noted negative per testing re-done in 2008 in hematology note 01/13/2009     Articulation disorder 09/23/2020     Atrial fibrillation (H) 04/2011     Benign prostatic hyperplasia with urinary retention      Cirrhosis (H)      CKD (chronic kidney disease) stage 2, GFR 60-89 ml/min      Diabetes mellitus type II     steroid induced     DJD (degenerative joint disease) of knee     SHAWN, worse on right     DVT (deep venous thrombosis) (H)      ED (erectile dysfunction)      Gallbladder polyp 05/2010     Mywsi-Nhmbux-Nhlr Disease 2007    BMT     Hemochromatosis      Hodgkin's disease NOS     5 cycles of ABVD in 2011     HTN (hypertension)      Hyperlipidaemia LDL goal <100      Multiple thyroid nodules     benign      Myeloproliferative disorder (H)     atypical, [facility]     Parkinson disease (H) 09/24/2020     PE (pulmonary embolism) 11/2004     Polyneuropathy      Pressure ulcer      Primary pulmonary hypertension (H)      Severe protein-calorie malnutrition (H) 11/10/2021     Small bowel obstruction (H) 10/29/2021     Thrombocytopenia (H) 06/08/2021     Social History     Socioeconomic History     Marital status:      Spouse name: Angelica     Number of children: 2     Years of education: 21     Highest education level: Not on file   Occupational History     Occupation:      Employer: [employer]     Employer: [employer]   Tobacco Use     Smoking status: Never     Smokeless tobacco: Never   Substance and Sexual Activity     Alcohol use: No     Drug use: No     Sexual activity: Not Currently     Partners: Female     Birth control/protection: None   Other Topics Concern     Parent/sibling w/ CABG, MI or angioplasty before 65F 55M? No   Social History Narrative     about 50 yrs        Wife has some health issues - back pain - second knee replaced    She had gastric  bypass and a fib and bad mitral valve        Son in [place]    Daughter in [facility] with 10 yo son.         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[employer]        Born and raised in [place]              Social Determinants of Health     Financial Resource Strain: Not on file   Food Insecurity: Not on file   Transportation Needs: Not on file   Physical Activity: Not on file   Stress: Not on file   Social Connections: Not on file   Intimate Partner Violence: Not on file   Housing Stability: Not on file     Family History   Problem Relation Age of Onset     Hypertension Mother      Cerebrovascular Disease Mother      Breast Cancer Mother      Arrhythmia Brother      Prostate Cancer Brother      Arrhythmia Sister         supraventricular tachycardia     Thyroid Disease Sister      Other - See Comments Son         lives in [place]. no kids and not .     Obsessive Compulsive Disorder Son      Depression Son      Eating Disorder Son         eats when depressed     Obesity Son      Thyroid Cancer Daughter         had thyroid removed     Bipolar Disorder Daughter      Other - See Comments Daughter         lives in [place] - single with one son 9  yrs     Thyroid Disease Daughter      Other - See Comments Sister      Alzheimer Disease Father      Diabetes Paternal Aunt      Gastrointestinal Disease Maternal Grandmother         colitis      Parkinsonism Other         2 cousins with parkinson     C.A.D. No family hx of      Current Outpatient Medications   Medication Sig Dispense Refill     amoxicillin (AMOXIL) 500 MG capsule Take 4 capsules (2,000 mg) by mouth once Take 1 hour prior to dental procedure       carbidopa-levodopa (SINEMET)  MG tablet 1 tab 3/day at 8am, 2pm and 8pm 270 tablet 1     flecainide (TAMBOCOR) 50 MG tablet Take 1 tablet (50 mg) by mouth 2 times daily 180 tablet 1     loratadine (CLARITIN REDITABS) 10 MG ODT Take 10 mg by mouth daily       metoprolol tartrate  (LOPRESSOR) 25 MG tablet 0.5 tablets (12.5 mg) by Oral or Feeding Tube route 2 times daily (crush tablet) 90 tablet 3     NONFORMULARY Abbott osmolyte feedings 2 cans 3/day       omeprazole (PRILOSEC) 20 MG DR capsule TAKE 1 CAPSULE BY MOUTH EVERY MORNING. 90 capsule 0     PREVIDENT 5000 DRY MOUTH 1.1 % GEL topical gel Apply to affected area daily   3     rosuvastatin (CRESTOR) 40 MG tablet Take 1 tablet (40 mg) by mouth At Bedtime 90 tablet 3     vitamin D3 (CHOLECALCIFEROL) 50 mcg (2000 units) tablet Take 1 tablet (50 mcg) by mouth daily       warfarin ANTICOAGULANT (COUMADIN) 2.5 MG tablet Take 5mg Friday & 3.75mg all the other days or as directed by [facility] 180 tablet 1

## 2023-01-20 ENCOUNTER — TRANSFERRED RECORDS (OUTPATIENT)
Dept: HEALTH INFORMATION MANAGEMENT | Facility: CLINIC | Age: 73
End: 2023-01-20
Payer: COMMERCIAL

## 2023-01-20 ENCOUNTER — ANTICOAGULATION THERAPY VISIT (OUTPATIENT)
Dept: ANTICOAGULATION | Facility: CLINIC | Age: 73
End: 2023-01-20
Payer: COMMERCIAL

## 2023-01-20 DIAGNOSIS — Z86.711 PERSONAL HISTORY OF PE (PULMONARY EMBOLISM): ICD-10-CM

## 2023-01-20 DIAGNOSIS — Z79.01 LONG TERM CURRENT USE OF ANTICOAGULANT THERAPY: Primary | ICD-10-CM

## 2023-01-20 DIAGNOSIS — I48.20 CHRONIC ATRIAL FIBRILLATION (H): ICD-10-CM

## 2023-01-20 DIAGNOSIS — I48.0 PAROXYSMAL ATRIAL FIBRILLATION (H): ICD-10-CM

## 2023-01-20 LAB — INR (EXTERNAL): 3.2 (ref 0.9–1.1)

## 2023-01-20 NOTE — PROGRESS NOTES
ANTICOAGULATION MANAGEMENT     Haresh Rock 72 year old male is on warfarin with supratherapeutic INR result. (Goal INR 2.0-3.0)    Recent labs: (last 7 days)     01/20/23  0000   INR 3.2*       ASSESSMENT       Source(s): Chart Review and Patient/Caregiver Call       Warfarin doses taken: Warfarin taken as instructed    Diet: using new nutritional formula for the past 3 days--will be restarting previous in 5 days    New illness, injury, or hospitalization: No    Medication/supplement changes: None noted    Signs or symptoms of bleeding or clotting: No    Previous INR: Therapeutic last 2(+) visits    Additional findings: INR has been trending up       PLAN     Recommended plan for ongoing change(s) affecting INR     Dosing Instructions: decrease your warfarin dose (4.5% change) with next INR in 1 week       Summary  As of 1/20/2023    Full warfarin instructions:  3.75 mg every day   Next INR check:  1/27/2023             Telephone call with Haresh who verbalizes understanding and agrees to plan and who agrees to plan and repeated back plan correctly    Patient to recheck with home meter    Education provided:     Please call back if any changes to your diet, medications or how you've been taking warfarin    Plan made per [facility] anticoagulation protocol    Belinda Lafleur, RN  Anticoagulation Clinic  1/20/2023    _______________________________________________________________________     Anticoagulation Episode Summary     Current INR goal:  2.0-3.0   TTR:  89.4 % (1 y)   Target end date:  Indefinite   Send INR reminders to:  ANTICOAG HOME MONITORING    Indications    Long-term (current) use of anticoagulants [Z79.01] [Z79.01]  Atrial fibrillation (H) [I48.91]  Antiphospholipid antibody syndrome (H) (Resolved) [D68.61]  Personal history of DVT (deep vein thrombosis) (Resolved) [Z86.718]  Atrial fibrillation  unspecified type (H) (Resolved) [I48.91]  Paroxysmal atrial fibrillation (H) [I48.0]  Chronic atrial  fibrillation (H) [I48.20]  Personal history of PE (pulmonary embolism) [Z86.711]           Comments:  JESSICA okay to manage by exception         Anticoagulation Care Providers     Provider Role Specialty Phone number    Anya Nowak MD Referring Family Medicine [phone]

## 2023-01-23 ENCOUNTER — VIRTUAL VISIT (OUTPATIENT)
Dept: NEUROLOGY | Facility: CLINIC | Age: 73
End: 2023-01-23
Payer: COMMERCIAL

## 2023-01-23 DIAGNOSIS — G20.A1 PARKINSON DISEASE (H): Primary | ICD-10-CM

## 2023-01-23 PROCEDURE — 99214 OFFICE O/P EST MOD 30 MIN: CPT | Mod: 95 | Performed by: PSYCHIATRY & NEUROLOGY

## 2023-01-23 RX ORDER — CARBIDOPA AND LEVODOPA 25; 100 MG/1; MG/1
TABLET ORAL
Qty: 270 TABLET | Refills: 3 | Status: SHIPPED | OUTPATIENT
Start: 2023-01-23 | End: 2023-07-10

## 2023-01-23 NOTE — LETTER
1/23/2023         RE: Haresh Rock  [address] [place] [address]        Dear Colleague,    Thank you for referring your patient, Haresh Rock, to the [facility] NEUROLOG[facility] CLINIC [place]. Please see a copy of my visit note below.        VIDEO VISIT    Date of Visit: January 23, 2023  Name: Haresh Rock  Date of Birth 1950  MRN: [id number]     [address] [place] [id number] [phone] (M)  [phone] (H)  [email or web address]     - sheyla Ramirez - son  Gemma - daughter  Sheyla Rock  [phone]     [email or web address]  [email or web address]     [phone]     Sitting in a lazy boy     [phone]    Assessment:  (G20) Parkinson disease (H)  (primary encounter diagnosis)    Dopamine scan - (datscan)  11/3/2020  A presynaptic dopaminergic deficit is demonstrated bilaterally.    Review of diagnosis    parkinson  Avoidance of dopamine blockers yes  Motor complication review  -   Review of Impulse control disorders no  Review of surgical or medication options yes    Avoidance of dopamine blockers   Not ta josiane    Motor complication review   no    Review of Impulse control disorders   denies    Review of surgical or medication options   reviewed    Gait/Balance/Falls   No falls    Exercise/Therapy performed/offered   Doing shoveling snow    Cognitive/Driving   Driving - not problems  No cognitive problems - no changes       Mood   Daughter gemma with bipolar - 2 or 3 miles away   Son july with mood issues  2 or 3 miles away   He has some depression    Living with Sheyla - has ongoing back pain - getting a tooth extracted this afternoon;  Constipation, heart and bladder issues.   Constipation or diarrhea - has problems controlling her bowels    Hallucinations/delusions   No hallucinations    Sleep   Pseudoeph-doxylamine DM APAP nyquil - not using  Sleep pretty well  Nocturia 2/noc  Falls asleep during the day  Napping during the day  Falls asleep when watching  news  Falls asleep during the afternoon  Up 3/noc for bathroom   Midnight goes to bed and up at 8am  Naps during day   Not talking in sleep or snoring.  No unusual behaviors  Wakes up before 3am, 5/6a and 7am    Bladder/Renal/Prostate/Gyn/Other  Renal function.   May have stage 2 disease.      Prostate - denies problems. He has has nocturia couple times per night.   Denies elevated psa     Prostate surgery; urinating better     Dutasteride avodart 0.5mg - off   Tamsulosin flomax 0.4mg - stopped  Had low blood pressure and fluids     July 5th ED visit and got catheter  Surgery yesterday laser and should get catheter out  Dr Jose G Hawley    GI/Constipation/GERD   Swallow study 9/22/2020  1. Multiple events of deep laryngeal penetration with thinner barium consistencies with intermittent aspiration.      Gallstones but has not gallbladder removed.  Had a gallbladder attack x 1 and is not on actigall     Bisacodyl dulcolax 5mg EC tablet - not using   Calcium carbonate tums 500mg ?  Nutritional supplement - 1 bottle per day  - not using   Pantoprazole protonix 20mg - not using  Polythyelene glycol miralax - not using   Prevident 5000 dry mouth - using  Senna-docusate senexon-S senokot-S  - no  Weight loss better with feeding tube  No constipation  Has some seepage  When urinates he has some stool     Had bloating and blockage with the feeding tube placement and had emergency surgery   10/29/2021 Scan showed problems     Feeding tube has helped him gain 20 lbs - he gets feedings 1 hour after medications so 9am, 3pm and 9pm     Takes parkinson medications by mouth for the first two doses and crush the last one of the day through the feeding tube  Schedule is 8am, 2pm and 8pm    With his fecal seepage discussed the use of a bidet or adapting his toilet with one - h e has a raised toilet seat so not clear what needs to be done. Consider seeing Occupational therapy.     Formula switched from abbott to nestle  161 or so  lbs      ENDO/Lipid/DM/Bone density/Thyroid  Cholesterol  Rosuvastatin crestor 40mg   Vitamin D3 cholecalciferol 50 mcg 2000 units  Had diabetes in the past from prednisone  And this may have resolved  May have a thyroid nodule.  TSH was normal.   A1c was 5.5        Cardio/heart/Hyper or Hypotensive   Flecainide tambocor 50mg twice daily  Metoprolol succinate ER Toprol XL 25mg 24 hr tablet  Blood pressure has been okay   122/63  Low bp has cleared up  MRI of heart next week    Vision/Dry Eyes/Cataracts/Glaucoma/Macular   Wears glasses  Eye - left eye has been poor since age 2 due to a lazy eye - amblyopia  Right eye post cataract surgery had an artificial lens put in and does not wear glasses till the evening        Heme/Anticoagulation/Antiplatelet/Anemia/Other  Folic acid folvite 1mg - not taking   Anticoagulated  Warfarin     Hereditary hemochromatosis gene - gene that increases his risk and has not had problems with this. Had had phlebotomies x 2 in the past.      AYUSH Torres     History of pulmonary embolii - back in 2004. This was related to blood clotting problems.   May have had a DVT. Reportedly has had antiphospholipid antibody. He is on coumadin.      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 Had a transplant from Dr. 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  No problems since then.      Graft vs host     He has high sed rate and will be seeing ID next week   crp was 12.8  Sed rate was 90  8/3/2021  Working with Marcelo Jacques    ENT/Resp  Hodgkin's lymphoma in the past 2011 and had chemotherapy for this and followed with CT scan and reportedly this is gone.      Pulmonary hypertension in the past.   Fluticasone flonase 50 mcg/act nasal spray  Loratadine claritin 10mg    CT of lung next      Hearing. Feels he has wax in his left ear and partial problem with the right ear. It is variable problem. Has used ear wax removal.     Feeding tube has helped him  gain 20 lbs - he gets feedings 1 hour after medications so 9am, 3pm and 9pm     Skin/Cancer/Seborrhea/other  No skin cancer    Musculoskeletal/Pain/Headache  bilateral knee replacements.    Other:  He reports a neuropathy and that his foot drop has resolved. 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    Medications      8a   2p  3p 8p  9p   Acetaminophen tylenol 500mg no             Amoxicillin amoxil 500mg dentist             Calcium carbonate tums 500mg no             Carbidopa/levodopa Sinemet 25/100 1 po    1po   Crush 1     Dutasteride avodart 0.5mg no             Flecainide tambocor 50mg 1       1     Fluticasone flonase 50 mcg/act spray prn             Loratadine claritin 10mg  1             Metoprolol tartrate 25mg  1/2 po       1/2 crush     MVI centrum 1             Nonformulary feeding tube osmolyte   2 cans   2 cans   2 cans   Omeprazole prilosec 20mg 1             Prevident 5000 dry mouth using             Rosuvastatin crestor 40mg         1     Senna-docusate 8.6-50 no             Starch-maltodextrin thick-it no             Vitamin D3 cholecalciferol 50 mcg 2000 gel  1             Warfarin anticoagulant coumadin 2.5mg         schedule                  Plan:  No constipation - bowels are a bit loose  Getting feedings - using nestle product  He has reflux -  Has a pillow but not a pillow wedge  He has to brush his teeth several times daily  Avoiding water pik because of difficulty handling secretions   Teeth gets crusty - tartar    May want to talk with his pcp about the omeprazole - should he be taking a different product or/and higher dose  Famotidine (pepcid) or Pantoprazole (protonix)    Overall his parkinson is stable  No falls  Refilled his sinemet that is from [facility]    Ongoing nocturia - had tried medication in the past that did not help  Not sure if he has been on mirabegron/myrbetriq, ubegron/gemtesa  Has seen Dr. Hawley jan 2023   Benign prostatic hyperplasia with  "urinary retention  (primary encounter diagnosis)  Comment: Bladder scan showed minimal residual urine today.  (N45.1) Epididymitis    Blood pressure has been stable  Anticoagulated   Using flecainide for his heart    Ongoing hematological care with Dr. JUNIOR    Return back           Medical Decision Making:  #  Chronic progressive medical conditions addressed  Parkinson and swallowing issues  Review and/or interpretation of unique test or documentation from a provider outside of neurology no   Independent historian provided additional details  no   Prescription drug management and review of potential side effects and/or monitoring for side effects  yes   Health impacted by social determinants of health  no    I have reviewed the note as documented above.  This accurately captures the substance of my conversation with the patient and total time spent preparing for visit, executing visit and completing visit on the day of the visit:  30 minutes.     Ángel Hill MD      ------------------------------------------------------------------------------------------------------------------------------------------------------------------------    Video-Visit Details    The patient has been notified of following:     \"After a review of the patient s situation, this visit was changed from an in-person visit to a video visit to reduce the risk of COVID-19 exposure.   The patient is being evaluated via a billable video visit.\"    \"This video visit will be conducted via a call between you and your physician/provider. We have found that certain health care needs can be provided without the need for an in-person physical exam.  This service lets us provide the care you need with a video conversation.  If a prescription is necessary we can send it directly to your pharmacy.  If lab work is needed we can place an order for that and you can then stop by our lab to have the test done at a later time.    If during the course of the call the " "physician/provider feels a video visit is not appropriate, you will not be charged for this service.\"     Patient has given verbal consent for Video visit? Yes    Patient would like the video invitation sent by:     Type of service:  Video Visit    Video Start Time:     Video End Time (time video stopped):     Duration:  minutes - see above    Originating Location (pt. Location):     Distant Location (provider location):  [facility] NEUROLOGY [facility] [place]     Mode of Communication:  Video Conference via [employer] (and if not possible then doximity)      Ángel Hill MD      --------------------------------------------------------------------------------------------------------------    Haresh Rock is a 72 year old male who is being evaluated via a billable video visit.      Charts reviewed  Consult from  Images reviewed        I have reviewed and updated the patient's Past Medical History, Social History, Family History and Medication List.    ALLERGIES  Seasonal allergies    Lasts visit details if there was a last visit:       14 Review of systems  are negative except for   Patient Active Problem List   Diagnosis     Hemochromatosis     Fmcxx-arittr-toiz disease, chronic (H)     Advanced directives, counseling/discussion     Polyneuropathy     Status post total knee replacements     Status post bone marrow transplant (H)     Hyperlipidemia with target LDL less than 100     Atrial fibrillation (H)     Chronic rhinitis     Gallstones without obstruction of gallbladder     Long-term (current) use of anticoagulants [Z79.01]     Thyroid nodule     Type 2 diabetes mellitus with stage 2 chronic kidney disease, without long-term current use of insulin (H)     History of Hodgkin's lymphoma     History of irradiation, presenting hazards to health     Primary pulmonary hypertension (H)     Hereditary hemochromatosis (H)     Oropharyngeal dysphagia     Articulation disorder     Personal history of PE " (pulmonary embolism)     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)     Abnormal dopamine scan (DaTSCAN) 2020     ACP (advance care planning)     Thrombocytopenia (H)     Paroxysmal atrial fibrillation (H)     Benign prostatic hyperplasia with urinary retention     Constipation     Parkinson's disease (H)     Gastrostomy tube in place (H)     Chronic atrial fibrillation (H)        Allergies   Allergen Reactions     Seasonal Allergies      Past Surgical History:   Procedure Laterality Date     ANESTHESIA CARDIOVERSION  04/21/2011    Procedure:ANESTHESIA CARDIOVERSION; Surgeon:GENERIC ANESTHESIA PROVIDER; Location:[facility] OR     ARTHROSCOPY KNEE RT/LT      LT     CATARACT IOL, RT/LT  2017     COLONOSCOPY  10/16/2012    Procedure: COLONOSCOPY;  Colonoscopy, screening;  Surgeon: Reg Feliciano MD;  Location: [facility] OR     COLONOSCOPY N/A 04/14/2021    Procedure: COLONOSCOPY;  Surgeon: Reg Holm MD;  Location: [facility] GI     ESOPHAGOSCOPY, GASTROSCOPY, DUODENOSCOPY (EGD), COMBINED N/A 10/07/2020    Procedure: ESOPHAGOGASTRODUODENOSCOPY, WITH BIOPSY;  Surgeon: Raul Sawyer DO;  Location: [facility] OR     H ABLATION FOCAL AFIB  03/05/2013    PVI     H ABLATION FOCAL AFIB  01/01/2018     HC BONE MARROW/STEM TRANSPLANT ALLOGENIC  05/08/2007     INSERT PORT VASCULAR ACCESS       IR GASTROSTOMY TUBE PERCUTANEOUS PLCMNT  10/25/2021     JOINT REPLACEMTN, KNEE RT/LT  03/28/2012    SHAWN     LAPAROSCOPY DIAGNOSTIC (GENERAL) N/A 10/29/2021    Procedure: LAPAROSCOPY, DIAGNOSTIC, TAKEDOWN OF MALPOSITIONED GASTROSTOMY TUBE, INSERTION OF GASTROSTOMY TUBE, SMALL BOWEL RESECTION X1, PRIMARY REPAIR OF SMALL BOWEL ENTEROTOMY, ABDOMINAL WASHOUT, TAP BLOCK;  Surgeon: Leif Finney DO;  Location: [facility] OR     PEG TUBE PLACEMENT  10/25/2021     VASECTOMY  1990     Past Medical History:   Diagnosis Date     Abnormal dopamine scan (DaTSCAN) 2020 11/04/2020    [phone] 11/3/2020   Narrative & Impression   Examination: Nuclear  medicine DATscan for Dopamine Receptor Localization.   Examination: NM Brain Image Tomographic (SPECT) DATscan   Date: 11/3/2020 3:21 PM   Indication: Parkinsonism   Comparison: None   Additional Information: none   Interfering Medications: None   Technique:   The patient initially received 1 ml of Lugol's solution orally prior     Antiphospholipid antibody syndrome (H)     Ravi's, noted negative per testing re-done in 2008 in hematology note 01/13/2009     Articulation disorder 09/23/2020     Atrial fibrillation (H) 04/2011     Benign prostatic hyperplasia with urinary retention      Cirrhosis (H)      CKD (chronic kidney disease) stage 2, GFR 60-89 ml/min      Diabetes mellitus type II     steroid induced     DJD (degenerative joint disease) of knee     SHAWN, worse on right     DVT (deep venous thrombosis) (H)      ED (erectile dysfunction)      Gallbladder polyp 05/2010     Fjbuq-Fqqvpb-Czpl Disease 2007    BMT     Hemochromatosis      Hodgkin's disease NOS     5 cycles of ABVD in 2011     HTN (hypertension)      Hyperlipidaemia LDL goal <100      Multiple thyroid nodules     benign      Myeloproliferative disorder (H)     atypical, [facility]     Parkinson disease (H) 09/24/2020     PE (pulmonary embolism) 11/2004     Polyneuropathy      Pressure ulcer      Primary pulmonary hypertension (H)      Severe protein-calorie malnutrition (H) 11/10/2021     Small bowel obstruction (H) 10/29/2021     Thrombocytopenia (H) 06/08/2021     Social History     Socioeconomic History     Marital status:      Spouse name: Angelica     Number of children: 2     Years of education: 21     Highest education level: Not on file   Occupational History     Occupation:      Employer: [employer]     Employer: DISABLED   Tobacco Use     Smoking status: Never     Smokeless tobacco: Never   Substance and Sexual Activity     Alcohol use: No     Drug use: No     Sexual activity: Not Currently     Partners: Female     Birth  control/protection: None   Other Topics Concern     Parent/sibling w/ CABG, MI or angioplasty before 65F 55M? No   Social History Narrative     about 50 yrs        Wife has some health issues - back pain - second knee replaced    She had gastric bypass and a fib and bad mitral valve        Son in [place]    Daughter in [facility] with 8 yo son.         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[employer]        Born and raised in [place] [facility]              Social Determinants of Health     Financial Resource Strain: Not on file   Food Insecurity: Not on file   Transportation Needs: Not on file   Physical Activity: Not on file   Stress: Not on file   Social Connections: Not on file   Intimate Partner Violence: Not on file   Housing Stability: Not on file     Family History   Problem Relation Age of Onset     Hypertension Mother      Cerebrovascular Disease Mother      Breast Cancer Mother      Arrhythmia Brother      Prostate Cancer Brother      Arrhythmia Sister         supraventricular tachycardia     Thyroid Disease Sister      Other - See Comments Son         lives in [place]. no kids and not .     Obsessive Compulsive Disorder Son      Depression Son      Eating Disorder Son         eats when depressed     Obesity Son      Thyroid Cancer Daughter         had thyroid removed     Bipolar Disorder Daughter      Other - See Comments Daughter         lives in [place] - single with one son 9  yrs     Thyroid Disease Daughter      Other - See Comments Sister      Alzheimer Disease Father      Diabetes Paternal Aunt      Gastrointestinal Disease Maternal Grandmother         colitis      Parkinsonism Other         2 cousins with parkinson     C.A.D. No family hx of      Current Outpatient Medications   Medication Sig Dispense Refill     amoxicillin (AMOXIL) 500 MG capsule Take 4 capsules (2,000 mg) by mouth once Take 1 hour prior to dental procedure       carbidopa-levodopa (SINEMET)   MG tablet 1 tab 3/day at 8am, 2pm and 8pm 270 tablet 1     flecainide (TAMBOCOR) 50 MG tablet Take 1 tablet (50 mg) by mouth 2 times daily 180 tablet 1     loratadine (CLARITIN REDITABS) 10 MG ODT Take 10 mg by mouth daily       metoprolol tartrate (LOPRESSOR) 25 MG tablet 0.5 tablets (12.5 mg) by Oral or Feeding Tube route 2 times daily (crush tablet) 90 tablet 3     NONFORMULARY Abbott osmolyte feedings 2 cans 3/day       omeprazole (PRILOSEC) 20 MG DR capsule TAKE 1 CAPSULE BY MOUTH EVERY MORNING. 90 capsule 0     PREVIDENT 5000 DRY MOUTH 1.1 % GEL topical gel Apply to affected area daily   3     rosuvastatin (CRESTOR) 40 MG tablet Take 1 tablet (40 mg) by mouth At Bedtime 90 tablet 3     vitamin D3 (CHOLECALCIFEROL) 50 mcg (2000 units) tablet Take 1 tablet (50 mcg) by mouth daily       warfarin ANTICOAGULANT (COUMADIN) 2.5 MG tablet Take 5mg Friday & 3.75mg all the other days or as directed by [facility] 180 tablet 1           Haresh is a 72 year old who is being evaluated via a billable video visit.      How would you like to obtain your AVS? MyChart  If the video visit is dropped, the invitation should be resent by: Send to e-mail at: [email or web address]  Will anyone else be joining your video visit? No        Video-Visit Details    Type of service:  Video Visit   Video Start Time:   Video End Time:    Originating Location (pt. Location):     Distant Location (provider location):    Platform used for Video Visit:   JUAN Pritchard, CHLOÉ ([facility])          Again, thank you for allowing me to participate in the care of your patient.        Sincerely,        Ángel Hill MD

## 2023-01-23 NOTE — PROGRESS NOTES
Haresh is a 72 year old who is being evaluated via a billable video visit.      How would you like to obtain your AVS? MyChart  If the video visit is dropped, the invitation should be resent by: Send to e-mail at: [email or web address]  Will anyone else be joining your video visit? No        Video-Visit Details    Type of service:  Video Visit   Video Start Time:   Video End Time:    Originating Location (pt. Location):     Distant Location (provider location):    Platform used for Video Visit:   JUAN Pritchard, CHLOÉ ([facility])

## 2023-01-23 NOTE — PATIENT INSTRUCTIONS
Medications      8a   2p  3p 8p  9p   Acetaminophen tylenol 500mg no             Amoxicillin amoxil 500mg dentist             Calcium carbonate tums 500mg no             Carbidopa/levodopa Sinemet 25/100 1 po    1po   Crush 1     Dutasteride avodart 0.5mg no             Flecainide tambocor 50mg 1       1     Fluticasone flonase 50 mcg/act spray prn             Loratadine claritin 10mg  1             Metoprolol tartrate 25mg  1/2 po       1/2 crush     MVI centrum 1             Nonformulary feeding tube osmolyte   2 cans   2 cans   2 cans   Omeprazole prilosec 20mg 1             Prevident 5000 dry mouth using             Rosuvastatin crestor 40mg         1     Senna-docusate 8.6-50 no             Starch-maltodextrin thick-it no             Vitamin D3 cholecalciferol 50 mcg 2000 gel  1             Warfarin anticoagulant coumadin 2.5mg         schedule                  Plan:  No constipation - bowels are a bit loose  Getting feedings - using nestle product  He has reflux -  Has a pillow but not a pillow wedge  He has to brush his teeth several times daily  Avoiding water pik because of difficulty handling secretions   Teeth gets crusty - tartar    May want to talk with his pcp about the omeprazole - should he be taking a different product or/and higher dose  Famotidine (pepcid) or Pantoprazole (protonix)    Overall his parkinson is stable  No falls  Refilled his sinemet that is from [facility]    Ongoing nocturia - had tried medication in the past that did not help  Not sure if he has been on mirabegron/myrbetriq, ubegron/gemtesa  Has seen Dr. Hawley jan 2023   Benign prostatic hyperplasia with urinary retention  (primary encounter diagnosis)  Comment: Bladder scan showed minimal residual urine today.  (N45.1) Epididymitis    Blood pressure has been stable  Anticoagulated   Using flecainide for his heart    Ongoing hematological care with Dr. JUNIOR    Return back

## 2023-01-27 ENCOUNTER — ANTICOAGULATION THERAPY VISIT (OUTPATIENT)
Dept: ANTICOAGULATION | Facility: CLINIC | Age: 73
End: 2023-01-27
Payer: COMMERCIAL

## 2023-01-27 ENCOUNTER — TRANSFERRED RECORDS (OUTPATIENT)
Dept: HEALTH INFORMATION MANAGEMENT | Facility: CLINIC | Age: 73
End: 2023-01-27
Payer: COMMERCIAL

## 2023-01-27 DIAGNOSIS — Z86.711 PERSONAL HISTORY OF PE (PULMONARY EMBOLISM): ICD-10-CM

## 2023-01-27 DIAGNOSIS — I48.20 CHRONIC ATRIAL FIBRILLATION (H): ICD-10-CM

## 2023-01-27 DIAGNOSIS — Z79.01 LONG TERM CURRENT USE OF ANTICOAGULANT THERAPY: Primary | ICD-10-CM

## 2023-01-27 DIAGNOSIS — I48.0 PAROXYSMAL ATRIAL FIBRILLATION (H): ICD-10-CM

## 2023-01-27 LAB — INR (EXTERNAL): 2.5 (ref 0.9–1.1)

## 2023-01-27 NOTE — PROGRESS NOTES
ANTICOAGULATION MANAGEMENT     Haresh Rock 72 year old male is on warfarin with therapeutic INR result. (Goal INR 2.0-3.0)    Recent labs: (last 7 days)     01/27/23  1122   INR 2.5*       ASSESSMENT       Source(s): Chart Review    Previous INR was Supratherapeutic    Medication, diet, health changes since last INR chart reviewed; none identified           PLAN     Recommended plan for no diet, medication or health factor changes affecting INR     Dosing Instructions: Continue your current warfarin dose with next INR in 1 week       Summary  As of 1/27/2023    Full warfarin instructions:  3.75 mg every day   Next INR check:  2/3/2023             Detailed voice message left for Haresh with dosing instructions and follow up date.     Patient to recheck with home meter    Education provided:     Please call back if any changes to your diet, medications or how you've been taking warfarin    Contact [phone]  with any changes, questions or concerns.     Plan made per [facility] anticoagulation protocol    Macrina Preston RN  Anticoagulation Clinic  1/27/2023    _______________________________________________________________________     Anticoagulation Episode Summary     Current INR goal:  2.0-3.0   TTR:  90.1 % (1 y)   Target end date:  Indefinite   Send INR reminders to:  ANTICOAG HOME MONITORING    Indications    Long-term (current) use of anticoagulants [Z79.01] [Z79.01]  Atrial fibrillation (H) [I48.91]  Antiphospholipid antibody syndrome (H) (Resolved) [D68.61]  Personal history of DVT (deep vein thrombosis) (Resolved) [Z86.718]  Atrial fibrillation  unspecified type (H) (Resolved) [I48.91]  Paroxysmal atrial fibrillation (H) [I48.0]  Chronic atrial fibrillation (H) [I48.20]  Personal history of PE (pulmonary embolism) [Z86.711]           Comments:  JESSICA rodas to manage by exception         Anticoagulation Care Providers     Provider Role Specialty Phone number    Anya Nowak MD Referring Family Medicine  [phone]

## 2023-02-02 ENCOUNTER — PATIENT OUTREACH (OUTPATIENT)
Dept: CARE COORDINATION | Facility: CLINIC | Age: 73
End: 2023-02-02
Payer: COMMERCIAL

## 2023-02-02 NOTE — PROGRESS NOTES
Clinic Care Coordination Contact  Care Team Conversations    The RN CC will put the date out 2 weeks for the 6 week chart review.   The CHW was notified of dates so that the patient can be contacted within the 2 week time period.              Uriel Zhou MSN, RN, PHN, CCM   Primary Care Clinical RN Care Coordinator  [facility]  2/2/2023   2:21 PM  [email or web address]  Office: [phone]

## 2023-02-03 ENCOUNTER — TRANSFERRED RECORDS (OUTPATIENT)
Dept: HEALTH INFORMATION MANAGEMENT | Facility: CLINIC | Age: 73
End: 2023-02-03
Payer: COMMERCIAL

## 2023-02-03 ENCOUNTER — ANTICOAGULATION THERAPY VISIT (OUTPATIENT)
Dept: ANTICOAGULATION | Facility: CLINIC | Age: 73
End: 2023-02-03
Payer: COMMERCIAL

## 2023-02-03 DIAGNOSIS — Z86.711 PERSONAL HISTORY OF PE (PULMONARY EMBOLISM): ICD-10-CM

## 2023-02-03 DIAGNOSIS — I48.0 PAROXYSMAL ATRIAL FIBRILLATION (H): ICD-10-CM

## 2023-02-03 DIAGNOSIS — I48.20 CHRONIC ATRIAL FIBRILLATION (H): ICD-10-CM

## 2023-02-03 DIAGNOSIS — Z79.01 LONG TERM CURRENT USE OF ANTICOAGULANT THERAPY: Primary | ICD-10-CM

## 2023-02-03 LAB — INR (EXTERNAL): 4 (ref 0.9–1.1)

## 2023-02-03 NOTE — PROGRESS NOTES
ANTICOAGULATION MANAGEMENT     Haresh Rock 72 year old male is on warfarin with supratherapeutic INR result. (Goal INR 2.0-3.0)    Recent labs: (last 7 days)     02/03/23  0842   INR 4.0*       ASSESSMENT       Source(s): Chart Review and Patient/Caregiver Call       Warfarin doses taken: Warfarin taken as instructed    Diet: Per patient he switched to a different formula brand that he used  via tube feeing about 2 weeks ago may be affecting INR    New illness, injury, or hospitalization: No    Medication/supplement changes: None noted    Signs or symptoms of bleeding or clotting: No    Previous INR: Therapeutic last visit; previously outside of goal range    Additional findings: None       PLAN     Recommended plan for ongoing change(s) affecting INR     Dosing Instructions: hold dose then decrease your warfarin dose (9.5% change) with next INR in 1 week       Summary  As of 2/3/2023    Full warfarin instructions:  2/3: Hold; Otherwise 2.5 mg every Tue, Fri; 3.75 mg all other days   Next INR check:  2/10/2023             Telephone call with Haresh who verbalizes understanding and agrees to plan    Patient to recheck with home meter    Education provided:     Goal range and lab monitoring: goal range and significance of current result, Importance of therapeutic range and Importance of following up at instructed interval    Symptom monitoring: monitoring for bleeding signs and symptoms    Plan made per [facility] anticoagulation protocol    Shanice Dhillon RN  Anticoagulation Clinic  2/3/2023    _______________________________________________________________________     Anticoagulation Episode Summary     Current INR goal:  2.0-3.0   TTR:  88.8 % (1 y)   Target end date:  Indefinite   Send INR reminders to:  [facility]    Indications    Long-term (current) use of anticoagulants [Z79.01] [Z79.01]  Atrial fibrillation (H) [I48.91]  Antiphospholipid antibody syndrome (H) (Resolved) [D68.61]  Personal history of  DVT (deep vein thrombosis) (Resolved) [Z86.718]  Atrial fibrillation  unspecified type (H) (Resolved) [I48.91]  Paroxysmal atrial fibrillation (H) [I48.0]  Chronic atrial fibrillation (H) [I48.20]  Personal history of PE (pulmonary embolism) [Z86.711]           Comments:  JESSICA rodas to manage by exception         Anticoagulation Care Providers     Provider Role Specialty Phone number    Anya Nowak MD Referring Family Medicine [phone]

## 2023-02-08 ENCOUNTER — PATIENT OUTREACH (OUTPATIENT)
Dept: CARE COORDINATION | Facility: CLINIC | Age: 73
End: 2023-02-08
Payer: COMMERCIAL

## 2023-02-08 NOTE — PROGRESS NOTES
Clinic Care Coordination Contact    Community Health Worker Follow Up    Care Gaps:     Health Maintenance Due   Topic Date Due     HF ACTION PLAN  Never done     ZOSTER IMMUNIZATION (1 of 2) 11/14/2016     EYE EXAM  01/01/2022     TSH W/FREE T4 REFLEX  08/20/2022     DTAP/TDAP/TD IMMUNIZATION (3 - Td or Tdap) 09/11/2022     CBC  01/04/2023       Patient will schedule follow up with PCP after his specialty appointments in April.     Care Plan:   Care Plan: General  Work on increasing strength and stamina for a year after reaching 100%     Problem: HP GENERAL PROBLEM     Goal: General  work on walking without the walker, and increasing my strength and stamina.  For at least 1 year after reaching 100%.     Start Date: 12/13/2021 Expected End Date: 6/13/2023    This Visit's Progress: 50% Recent Progress: 40%    Note:     Barriers: suffers from parkinson's  Strengths: engaged in care coordination  Patient expressed understanding of goal: yes  Action steps to achieve this goal:  1. I will practice in the house walking without the walker. No longer using the walker as of 3/15/22. Completed action step  2. I will go outside without the walker when I feel strong enough and have increased my strength and stamina.  Continue to work on for at least a year.  3. I will frequently walk around inside the house to increase my strength and stamina.  Continue to work on for at least a year.                    Care Plan: General - Working on balance and items in the path,     Problem: HP GENERAL PROBLEM     Goal: General Goal - work on balance and avoiding items in the path     Start Date: 10/28/2022 Expected End Date: 10/28/2023    This Visit's Progress: 20% Recent Progress: 10%    Note:     Barriers: parkinson's  Strengths: engaged in care coordination  Patient expressed understanding of goal: yes   Action steps to achieve this goal:  1. I will work on walking around items in my path without losing my balance.  2. I will work on  maintaining my balance when I walk through the house.  3. I will use my walker or cane as needed to maintain my balance.                          Intervention and Education during outreach: Patient states he has been doing well. He is getting around without his walker for the most part. Today he walked [facility] while his wife was at an appointment. He is trying to remain active as much as he can. He has noticed his balance has been a little worse lately and has his walker available if needed.     CHW Plan: CHW will continue to support patient with goals through routine scheduled outreach.     Next outreach due: 3/8/23

## 2023-02-10 ENCOUNTER — TRANSFERRED RECORDS (OUTPATIENT)
Dept: HEALTH INFORMATION MANAGEMENT | Facility: CLINIC | Age: 73
End: 2023-02-10
Payer: COMMERCIAL

## 2023-02-10 ENCOUNTER — ANTICOAGULATION THERAPY VISIT (OUTPATIENT)
Dept: ANTICOAGULATION | Facility: CLINIC | Age: 73
End: 2023-02-10
Payer: COMMERCIAL

## 2023-02-10 DIAGNOSIS — I48.0 PAROXYSMAL ATRIAL FIBRILLATION (H): ICD-10-CM

## 2023-02-10 DIAGNOSIS — I48.20 CHRONIC ATRIAL FIBRILLATION (H): ICD-10-CM

## 2023-02-10 DIAGNOSIS — Z86.711 PERSONAL HISTORY OF PE (PULMONARY EMBOLISM): ICD-10-CM

## 2023-02-10 DIAGNOSIS — Z79.01 LONG TERM CURRENT USE OF ANTICOAGULANT THERAPY: Primary | ICD-10-CM

## 2023-02-10 LAB — INR (EXTERNAL): 2.9 (ref 0.9–1.1)

## 2023-02-10 NOTE — PROGRESS NOTES
ANTICOAGULATION MANAGEMENT     Haresh Rock 72 year old male is on warfarin with therapeutic INR result. (Goal INR 2.0-3.0)    Recent labs: (last 7 days)     02/10/23  0910   INR 2.9*       ASSESSMENT       Source(s): Chart Review and Patient/Caregiver Call       Warfarin doses taken: Warfarin taken as instructed    Diet: No new diet changes identified    New illness, injury, or hospitalization: No    Medication/supplement changes: None noted    Signs or symptoms of bleeding or clotting: No    Previous INR: Supratherapeutic    Additional findings: None       PLAN     Recommended plan for no diet, medication or health factor changes affecting INR     Dosing Instructions: Continue your current warfarin dose with next INR in 1 week       Summary  As of 2/10/2023    Full warfarin instructions:  2.5 mg every Tue, Fri; 3.75 mg all other days   Next INR check:  2/17/2023             Telephone call with Haresh who verbalizes understanding and agrees to plan    Patient to recheck with home meter    Education provided:     Please call back if any changes to your diet, medications or how you've been taking warfarin    Resume manage by exception with home monitor. Continue to submit INR results to home monitor company.You will only be called when your result is out of range. Please call and notify [facility] if new medication started, dose missed, signs or symptoms of bleeding or clotting, or a surgery/procedure is scheduled.    Plan made per [facility] anticoagulation protocol    Shanice Hyde RN  Anticoagulation Clinic  2/10/2023    _______________________________________________________________________     Anticoagulation Episode Summary     Current INR goal:  2.0-3.0   TTR:  87.2 % (1 y)   Target end date:  Indefinite   Send INR reminders to:  [facility]    Indications    Long-term (current) use of anticoagulants [Z79.01] [Z79.01]  Atrial fibrillation (H) [I48.91]  Antiphospholipid antibody syndrome (H) (Resolved)  [D68.61]  Personal history of DVT (deep vein thrombosis) (Resolved) [Z86.718]  Atrial fibrillation  unspecified type (H) (Resolved) [I48.91]  Paroxysmal atrial fibrillation (H) [I48.0]  Chronic atrial fibrillation (H) [I48.20]  Personal history of PE (pulmonary embolism) [Z86.711]           Comments:  JESSICA rodas to manage by exception         Anticoagulation Care Providers     Provider Role Specialty Phone number    Anya Nowak MD Referring Family Medicine [phone]

## 2023-02-10 NOTE — PLAN OF CARE
"/74 (BP Location: Right arm)   Pulse 75   Temp 97.6  F (36.4  C) (Oral)   Resp 16   Ht 1.804 m (5' 11.02\")   Wt 68.5 kg (151 lb)   SpO2 97%   BMI 21.05 kg/m      Shift: 2300 - 0730  VS: Stable on RA, afebrile  Neuro: AOx4  Labs: Creatinine: 1.31; Procalcitonin: 1.96; Heparin Xa within therapeutic range this AM  Respiratory: Pt denies dyspnea, no cough noted. LS diminished bilaterally  Pain/Nausea/PRN: Pt denies need for pain or nausea medications  Diet: NPO; Continuous TF @ 15 mL/hr  LDA: R PIV x2 (TKO, Heparin gtt @ 9.5 mL/hr); G/J tube (G to gravity, J infusing TF @ 15 mL/hr);  GI/: No BM since surgery, pt states he is not passing flatus. Primofit in place (changed @ 0600)  Skin: No new findings this shift  Mobility: Assist x2  Plan: Staff is awaiting delivery of pulsate mattress. Family would like care conference this week to discuss pt care plan and quality of life management.    Will give report to oncoming nurse. Pt left in stable condition, care relinquished at this time.     " done

## 2023-02-16 ENCOUNTER — PATIENT OUTREACH (OUTPATIENT)
Dept: CARE COORDINATION | Facility: CLINIC | Age: 73
End: 2023-02-16
Payer: COMMERCIAL

## 2023-02-16 NOTE — PROGRESS NOTES
Clinic Care Coordination Contact  Care Coordination Clinician Chart Review    Situation: Patient chart reviewed by Care Coordinator.       Background: Care Coordination Program started: 11/5/2021. Initial assessment completed and patient-centered care plan(s) were developed with participation from patient. Lead CC handed patient off to CHW for continued outreaches.       Assessment: Per chart review, patient outreach completed by CC CHW on 2/8/23.  Patient is actively working to accomplish goal(s). Patient's goal(s) appropriate and relevant at this time. Patient is not due for updated Plan of Care.  Assessments will be completed annually or as needed/with change of patient status.      Care Plan: General  Work on increasing strength and stamina for a year after reaching 100%     Problem: HP GENERAL PROBLEM     Goal: General  work on walking without the walker, and increasing my strength and stamina.  For at least 1 year after reaching 100%.     Start Date: 12/13/2021 Expected End Date: 6/13/2023    This Visit's Progress: 50% Recent Progress: 40%    Note:     Barriers: suffers from parkinson's  Strengths: engaged in care coordination  Patient expressed understanding of goal: yes  Action steps to achieve this goal:  1. I will practice in the house walking without the walker. No longer using the walker as of 3/15/22. Completed action step  2. I will go outside without the walker when I feel strong enough and have increased my strength and stamina.  Continue to work on for at least a year.  3. I will frequently walk around inside the house to increase my strength and stamina.  Continue to work on for at least a year.                    Care Plan: General - Working on balance and items in the path,     Problem: HP GENERAL PROBLEM     Goal: General Goal - work on balance and avoiding items in the path     Start Date: 10/28/2022 Expected End Date: 10/28/2023    This Visit's Progress: 20% Recent Progress: 10%    Note:      Barriers: parkinson's  Strengths: engaged in care coordination  Patient expressed understanding of goal: yes   Action steps to achieve this goal:  1. I will work on walking around items in my path without losing my balance.  2. I will work on maintaining my balance when I walk through the house.  3. I will use my walker or cane as needed to maintain my balance.                             Plan/Recommendations: The patient will continue working with Care Coordination to achieve goal(s) as above. CHW will continue outreaches at minimum every 30 days and will involve Lead CC as needed or if patient is ready to move to Maintenance. Lead CC will continue to monitor CHW outreaches and patient's progress to goal(s) every 6 weeks.     Plan of Care updated and sent to patient: No.          Uriel Zhou MSN, RN, PHN, CCM   Primary Care Clinical RN Care Coordinator  [facility]  2/16/2023   8:01 AM  [email or web address]  Office: [phone]

## 2023-02-17 ENCOUNTER — DOCUMENTATION ONLY (OUTPATIENT)
Dept: ANTICOAGULATION | Facility: CLINIC | Age: 73
End: 2023-02-17
Payer: COMMERCIAL

## 2023-02-17 ENCOUNTER — TRANSFERRED RECORDS (OUTPATIENT)
Dept: HEALTH INFORMATION MANAGEMENT | Facility: CLINIC | Age: 73
End: 2023-02-17
Payer: COMMERCIAL

## 2023-02-17 LAB — INR (EXTERNAL): 2.7 (ref 0.9–1.1)

## 2023-02-17 NOTE — PROGRESS NOTES
Incoming fax from [employer] home monitor company    Date of result  2/17/23  09:14 AM    INR result  2.7      ANTICOAGULATION  MANAGEMENT-Home Monitor Managed by Exception    Haresh EMILIE Rock 72 year old male is on warfarin with therapeutic INR result. (Goal INR 2.0-3.0)    Recent labs: (last 7 days)     02/17/23  0914   INR 2.7*         Previous INR was Therapeutic    Medication, diet, health changes since last INR:chart reviewed; none identified    Contacted within the last 12 weeks by phone on  2/10/23      MINNIE Hutson was NOT contacted regarding therapeutic result today per home monitoring policy manage by exception agreement.   Current warfarin dose is to be continued:     Summary  As of 2/17/2023    Full warfarin instructions:  2.5 mg every Tue, Fri; 3.75 mg all other days   Next INR check:  2/24/2023           ?   Gabriela Sosa RN  Anticoagulation Clinic  2/17/2023    _______________________________________________________________________     Anticoagulation Episode Summary     Current INR goal:  2.0-3.0   TTR:  87.2 % (1 y)   Target end date:  Indefinite   Send INR reminders to:  [facility]    Indications    Long-term (current) use of anticoagulants [Z79.01] [Z79.01]  Atrial fibrillation (H) [I48.91]  Antiphospholipid antibody syndrome (H) (Resolved) [D68.61]  Personal history of DVT (deep vein thrombosis) (Resolved) [Z86.718]  Atrial fibrillation  unspecified type (H) (Resolved) [I48.91]  Paroxysmal atrial fibrillation (H) [I48.0]  Chronic atrial fibrillation (H) [I48.20]  Personal history of PE (pulmonary embolism) [Z86.711]           Comments:  JESSICA rodas to manage by exception         Anticoagulation Care Providers     Provider Role Specialty Phone number    Anya Nowak MD Referring Family Medicine [phone]

## 2023-02-20 ENCOUNTER — PATIENT OUTREACH (OUTPATIENT)
Dept: CARE COORDINATION | Facility: CLINIC | Age: 73
End: 2023-02-20
Payer: COMMERCIAL

## 2023-02-20 NOTE — PROGRESS NOTES
Clinic Care Coordination Contact  [facility]       Clinical Data: CHW Outreach    Outreach attempted x 1 Briefly Spoke with Patient (Haresh).  Patient expressed that he is unavailable to talk at the moment. Further requesting CHW to call at a later time. CHW acknowledged and provided call back information.    Plan: CHW will make another attempt to reach the patient via phone or MyChart.    CHW outreach in the next 2 weeks.      KARTHIKEYAN Vu  Clinic Care Coordination   [facility]   Phone: [phone]  [email or web address]

## 2023-02-24 ENCOUNTER — DOCUMENTATION ONLY (OUTPATIENT)
Dept: ANTICOAGULATION | Facility: CLINIC | Age: 73
End: 2023-02-24
Payer: COMMERCIAL

## 2023-02-24 ENCOUNTER — TRANSFERRED RECORDS (OUTPATIENT)
Dept: HEALTH INFORMATION MANAGEMENT | Facility: CLINIC | Age: 73
End: 2023-02-24
Payer: COMMERCIAL

## 2023-02-24 DIAGNOSIS — Z86.711 PERSONAL HISTORY OF PE (PULMONARY EMBOLISM): ICD-10-CM

## 2023-02-24 DIAGNOSIS — I48.0 PAROXYSMAL ATRIAL FIBRILLATION (H): ICD-10-CM

## 2023-02-24 DIAGNOSIS — I48.20 CHRONIC ATRIAL FIBRILLATION (H): ICD-10-CM

## 2023-02-24 DIAGNOSIS — Z79.01 LONG TERM CURRENT USE OF ANTICOAGULANT THERAPY: Primary | ICD-10-CM

## 2023-02-24 LAB — INR (EXTERNAL): 2.9 (ref 0.9–1.1)

## 2023-02-24 NOTE — PROGRESS NOTES
ANTICOAGULATION  MANAGEMENT-Home Monitor Managed by Exception    Haresh EMILIE Rock 72 year old male is on warfarin with therapeutic INR result. (Goal INR 2.0-3.0)    Recent labs: (last 7 days)     02/24/23  0835   INR 2.9*         Previous INR was Therapeutic    Medication, diet, health changes since last INR:chart reviewed; none identified    Contacted within the last 12 weeks by phone on 2/10/23      MINNIE Hutson was NOT contacted regarding therapeutic result today per home monitoring policy manage by exception agreement.   Current warfarin dose is to be continued:     Summary  As of 2/24/2023    Full warfarin instructions:  2.5 mg every Tue, Fri; 3.75 mg all other days   Next INR check:  3/3/2023           ?   Macrina Preston RN  Anticoagulation Clinic  2/24/2023    _______________________________________________________________________     Anticoagulation Episode Summary     Current INR goal:  2.0-3.0   TTR:  87.2 % (1 y)   Target end date:  Indefinite   Send INR reminders to:  [facility]    Indications    Long-term (current) use of anticoagulants [Z79.01] [Z79.01]  Atrial fibrillation (H) [I48.91]  Antiphospholipid antibody syndrome (H) (Resolved) [D68.61]  Personal history of DVT (deep vein thrombosis) (Resolved) [Z86.718]  Atrial fibrillation  unspecified type (H) (Resolved) [I48.91]  Paroxysmal atrial fibrillation (H) [I48.0]  Chronic atrial fibrillation (H) [I48.20]  Personal history of PE (pulmonary embolism) [Z86.711]           Comments:  JESSICA rodas to manage by exception         Anticoagulation Care Providers     Provider Role Specialty Phone number    Anya Nowak MD Referring Family Medicine [phone]

## 2023-03-03 ENCOUNTER — DOCUMENTATION ONLY (OUTPATIENT)
Dept: ANTICOAGULATION | Facility: CLINIC | Age: 73
End: 2023-03-03
Payer: COMMERCIAL

## 2023-03-03 ENCOUNTER — TRANSFERRED RECORDS (OUTPATIENT)
Dept: HEALTH INFORMATION MANAGEMENT | Facility: CLINIC | Age: 73
End: 2023-03-03
Payer: COMMERCIAL

## 2023-03-03 DIAGNOSIS — I48.20 CHRONIC ATRIAL FIBRILLATION (H): ICD-10-CM

## 2023-03-03 DIAGNOSIS — Z86.711 PERSONAL HISTORY OF PE (PULMONARY EMBOLISM): ICD-10-CM

## 2023-03-03 DIAGNOSIS — Z79.01 LONG TERM CURRENT USE OF ANTICOAGULANT THERAPY: Primary | ICD-10-CM

## 2023-03-03 DIAGNOSIS — I48.0 PAROXYSMAL ATRIAL FIBRILLATION (H): ICD-10-CM

## 2023-03-03 LAB — INR (EXTERNAL): 2.4 (ref 0.9–1.1)

## 2023-03-03 NOTE — PROGRESS NOTES
ANTICOAGULATION  MANAGEMENT-Home Monitor Managed by Exception    Haresh Rock 72 year old male is on warfarin with therapeutic INR result. (Goal INR 2.0-3.0)    Recent labs: (last 7 days)     03/03/23  0819   INR 2.4*         Previous INR was Therapeutic    Medication, diet, health changes since last INR:chart reviewed; none identified    Contacted within the last 12 weeks by phone on 2/10/23      MINNIE Hutson was NOT contacted regarding therapeutic result today per home monitoring policy manage by exception agreement.   Current warfarin dose is to be continued:     Summary  As of 3/3/2023    Full warfarin instructions:  2.5 mg every Tue, Fri; 3.75 mg all other days   Next INR check:  3/10/2023           ?   Allyn Doe RN  Anticoagulation Clinic  3/3/2023    _______________________________________________________________________     Anticoagulation Episode Summary     Current INR goal:  2.0-3.0   TTR:  87.2 % (1 y)   Target end date:  Indefinite   Send INR reminders to:  [facility]    Indications    Long-term (current) use of anticoagulants [Z79.01] [Z79.01]  Atrial fibrillation (H) [I48.91]  Antiphospholipid antibody syndrome (H) (Resolved) [D68.61]  Personal history of DVT (deep vein thrombosis) (Resolved) [Z86.718]  Atrial fibrillation  unspecified type (H) (Resolved) [I48.91]  Paroxysmal atrial fibrillation (H) [I48.0]  Chronic atrial fibrillation (H) [I48.20]  Personal history of PE (pulmonary embolism) [Z86.711]           Comments:  JESSICA rodas to manage by exception         Anticoagulation Care Providers     Provider Role Specialty Phone number    Anya Nowak MD Referring Family Medicine [phone]

## 2023-03-08 ENCOUNTER — PATIENT OUTREACH (OUTPATIENT)
Dept: CARE COORDINATION | Facility: CLINIC | Age: 73
End: 2023-03-08
Payer: COMMERCIAL

## 2023-03-08 NOTE — PROGRESS NOTES
[facility]/Voicemail       Clinical Data: Care Coordinator Outreach  Outreach attempted x 1.  Left message on patient's voicemail with call back information and requested return call.  Plan:   Care Coordinator will try to reach patient again in 3-5 business days.    Next outreach due: 3/15/23

## 2023-03-10 ENCOUNTER — TRANSFERRED RECORDS (OUTPATIENT)
Dept: HEALTH INFORMATION MANAGEMENT | Facility: CLINIC | Age: 73
End: 2023-03-10
Payer: COMMERCIAL

## 2023-03-10 ENCOUNTER — DOCUMENTATION ONLY (OUTPATIENT)
Dept: ANTICOAGULATION | Facility: CLINIC | Age: 73
End: 2023-03-10
Payer: COMMERCIAL

## 2023-03-10 DIAGNOSIS — I48.20 CHRONIC ATRIAL FIBRILLATION (H): ICD-10-CM

## 2023-03-10 DIAGNOSIS — Z86.711 PERSONAL HISTORY OF PE (PULMONARY EMBOLISM): ICD-10-CM

## 2023-03-10 DIAGNOSIS — Z79.01 LONG TERM CURRENT USE OF ANTICOAGULANT THERAPY: Primary | ICD-10-CM

## 2023-03-10 DIAGNOSIS — I48.0 PAROXYSMAL ATRIAL FIBRILLATION (H): ICD-10-CM

## 2023-03-10 LAB — INR (EXTERNAL): 2.7 (ref 0.9–1.1)

## 2023-03-10 NOTE — PROGRESS NOTES
ANTICOAGULATION  MANAGEMENT-Home Monitor Managed by Exception    Haresh EMILIE Rock 72 year old male is on warfarin with therapeutic INR result. (Goal INR 2.0-3.0)    Recent labs: (last 7 days)     03/10/23  0812   INR 2.7*         Previous INR was Therapeutic    Medication, diet, health changes since last INR:chart reviewed; none identified    Contacted within the last 12 weeks by phone on 2/10/23      MINNIE Hutson was NOT contacted regarding therapeutic result today per home monitoring policy manage by exception agreement.   Current warfarin dose is to be continued:     Summary  As of 3/10/2023    Full warfarin instructions:  2.5 mg every Tue, Fri; 3.75 mg all other days   Next INR check:  3/17/2023           ?   Blanca Rodriguez RN  Anticoagulation Clinic  3/10/2023    _______________________________________________________________________     Anticoagulation Episode Summary     Current INR goal:  2.0-3.0   TTR:  87.2 % (1 y)   Target end date:  Indefinite   Send INR reminders to:  [facility]    Indications    Long-term (current) use of anticoagulants [Z79.01] [Z79.01]  Atrial fibrillation (H) [I48.91]  Antiphospholipid antibody syndrome (H) (Resolved) [D68.61]  Personal history of DVT (deep vein thrombosis) (Resolved) [Z86.718]  Atrial fibrillation  unspecified type (H) (Resolved) [I48.91]  Paroxysmal atrial fibrillation (H) [I48.0]  Chronic atrial fibrillation (H) [I48.20]  Personal history of PE (pulmonary embolism) [Z86.711]           Comments:  JESSICA rodas to manage by exception         Anticoagulation Care Providers     Provider Role Specialty Phone number    Anya Nowak MD Referring Family Medicine [phone]

## 2023-03-14 ENCOUNTER — PATIENT OUTREACH (OUTPATIENT)
Dept: CARE COORDINATION | Facility: CLINIC | Age: 73
End: 2023-03-14
Payer: COMMERCIAL

## 2023-03-14 NOTE — PROGRESS NOTES
Clinic Care Coordination Contact    Community Health Worker Follow Up    Care Gaps:     Health Maintenance Due   Topic Date Due     HF ACTION PLAN  Never done     ZOSTER IMMUNIZATION (1 of 2) 11/14/2016     EYE EXAM  01/01/2022     TSH W/FREE T4 REFLEX  08/20/2022     DTAP/TDAP/TD IMMUNIZATION (3 - Td or Tdap) 09/11/2022     CBC  01/04/2023     BMP  04/04/2023       Reminded patient he is due for eye exam and to address care gaps at next PCP visit     Care Plan:   Care Plan: General  Work on increasing strength and stamina for a year after reaching 100%     Problem: HP GENERAL PROBLEM     Goal: General  work on walking without the walker, and increasing my strength and stamina.  For at least 1 year after reaching 100%.     Start Date: 12/13/2021 Expected End Date: 6/13/2023    This Visit's Progress: 60% Recent Progress: 50%    Note:     Barriers: suffers from parkinson's  Strengths: engaged in care coordination  Patient expressed understanding of goal: yes  Action steps to achieve this goal:  1. I will continue walking without the walker. No longer using the walker as of 3/15/22. Completed action step  2. I will go outside without the walker when I feel strong enough and have increased my strength and stamina.  Continue to work on for at least a year.  3. I will frequently walk around inside the house to increase my strength and stamina.  Continue to work on for at least a year.                    Care Plan: General - Working on balance and items in the path,     Problem: HP GENERAL PROBLEM     Goal: General Goal - work on balance and avoiding items in the path     Start Date: 10/28/2022 Expected End Date: 10/28/2023    This Visit's Progress: 40% Recent Progress: 20%    Note:     Barriers: parkinson's  Strengths: engaged in care coordination  Patient expressed understanding of goal: yes   Action steps to achieve this goal:  1. I will work on walking around items in my path without losing my balance.  2. I will work  on maintaining my balance when I walk through the house.  3. I will use my walker or cane as needed to maintain my balance.                          Intervention and Education during outreach: Patient has been active lately with all of the shoveling. He hasn't been to the mall to walk recently but is doing the stairs in his home. He has 4 flights he climbs multiple times a day.     Patient has not had to use his walker in months and states the his Apple watch has technology to tell him when his gait is getting unsteady. He hasn't had any recent alerts.     CHW Plan: CHW will continue to support patient with goals through routine scheduled outreach.     Next outreach due: 4/12/23

## 2023-03-17 ENCOUNTER — TRANSFERRED RECORDS (OUTPATIENT)
Dept: HEALTH INFORMATION MANAGEMENT | Facility: CLINIC | Age: 73
End: 2023-03-17
Payer: COMMERCIAL

## 2023-03-17 ENCOUNTER — ANTICOAGULATION THERAPY VISIT (OUTPATIENT)
Dept: ANTICOAGULATION | Facility: CLINIC | Age: 73
End: 2023-03-17
Payer: COMMERCIAL

## 2023-03-17 DIAGNOSIS — Z79.01 LONG TERM CURRENT USE OF ANTICOAGULANT THERAPY: Primary | ICD-10-CM

## 2023-03-17 DIAGNOSIS — I48.20 CHRONIC ATRIAL FIBRILLATION (H): ICD-10-CM

## 2023-03-17 DIAGNOSIS — I48.0 PAROXYSMAL ATRIAL FIBRILLATION (H): ICD-10-CM

## 2023-03-17 DIAGNOSIS — Z86.711 PERSONAL HISTORY OF PE (PULMONARY EMBOLISM): ICD-10-CM

## 2023-03-17 LAB — INR (EXTERNAL): 2.5 (ref 0.9–1.1)

## 2023-03-17 NOTE — PROGRESS NOTES
ANTICOAGULATION  MANAGEMENT-Home Monitor Managed by Exception    Haresh Rock 72 year old male is on warfarin with therapeutic INR result. (Goal INR 2.0-3.0)    Recent labs: (last 7 days)     03/17/23  0857   INR 2.5*         Previous INR was Therapeutic    Medication, diet, health changes since last INR:chart reviewed; none identified    Contacted within the last 12 weeks by phone on 2/10/23      MINNIE Hutson was NOT contacted regarding therapeutic result today per home monitoring policy manage by exception agreement.   Current warfarin dose is to be continued:     Summary  As of 3/17/2023    Full warfarin instructions:  2.5 mg every Tue, Fri; 3.75 mg all other days   Next INR check:  3/24/2023           ?   Allyn Doe RN  Anticoagulation Clinic  3/17/2023    _______________________________________________________________________     Anticoagulation Episode Summary     Current INR goal:  2.0-3.0   TTR:  87.2 % (1 y)   Target end date:  Indefinite   Send INR reminders to:  [facility]    Indications    Long-term (current) use of anticoagulants [Z79.01] [Z79.01]  Atrial fibrillation (H) [I48.91]  Antiphospholipid antibody syndrome (H) (Resolved) [D68.61]  Personal history of DVT (deep vein thrombosis) (Resolved) [Z86.718]  Atrial fibrillation  unspecified type (H) (Resolved) [I48.91]  Paroxysmal atrial fibrillation (H) [I48.0]  Chronic atrial fibrillation (H) [I48.20]  Personal history of PE (pulmonary embolism) [Z86.711]           Comments:  JESSICA rodas to manage by exception         Anticoagulation Care Providers     Provider Role Specialty Phone number    Anya Nowak MD Referring Family Medicine [phone]

## 2023-03-22 DIAGNOSIS — I48.0 PAROXYSMAL ATRIAL FIBRILLATION (H): ICD-10-CM

## 2023-03-22 DIAGNOSIS — I47.10 PAROXYSMAL SUPRAVENTRICULAR TACHYCARDIA (H): ICD-10-CM

## 2023-03-24 ENCOUNTER — DOCUMENTATION ONLY (OUTPATIENT)
Dept: ANTICOAGULATION | Facility: CLINIC | Age: 73
End: 2023-03-24
Payer: COMMERCIAL

## 2023-03-24 ENCOUNTER — TRANSFERRED RECORDS (OUTPATIENT)
Dept: HEALTH INFORMATION MANAGEMENT | Facility: CLINIC | Age: 73
End: 2023-03-24
Payer: COMMERCIAL

## 2023-03-24 DIAGNOSIS — Z79.01 LONG TERM CURRENT USE OF ANTICOAGULANT THERAPY: Primary | ICD-10-CM

## 2023-03-24 DIAGNOSIS — I48.0 PAROXYSMAL ATRIAL FIBRILLATION (H): ICD-10-CM

## 2023-03-24 DIAGNOSIS — I48.20 CHRONIC ATRIAL FIBRILLATION (H): ICD-10-CM

## 2023-03-24 DIAGNOSIS — Z86.711 PERSONAL HISTORY OF PE (PULMONARY EMBOLISM): ICD-10-CM

## 2023-03-24 LAB — INR (EXTERNAL): 2.7 (ref 0.9–1.1)

## 2023-03-24 NOTE — TELEPHONE ENCOUNTER
flecainide (TAMBOCOR) 50 MG  Last Written Prescription Date:  9/20/22  Last Fill Quantity: 180,   # refills: 1  Last Office Visit : 10/31/22  Future Office visit:  NONE  Routing refill request to provider for review/approval because:  Drug not on the refill protocol

## 2023-03-24 NOTE — PROGRESS NOTES
ANTICOAGULATION  MANAGEMENT-Home Monitor Managed by Exception    Ahresh EMILIE Rock 72 year old male is on warfarin with therapeutic INR result. (Goal INR 2.0-3.0)    Recent labs: (last 7 days)     03/24/23  0856   INR 2.7*         Previous INR was Therapeutic    Medication, diet, health changes since last INR:chart reviewed; none identified    Contacted within the last 12 weeks by phone on 2/10/23      MINNIE Hutson was NOT contacted regarding therapeutic result today per home monitoring policy manage by exception agreement.   Current warfarin dose is to be continued:     Summary  As of 3/24/2023    Full warfarin instructions:  2.5 mg every Tue, Fri; 3.75 mg all other days   Next INR check:  3/31/2023           ?   Nina Ventura RN  Anticoagulation Clinic  3/24/2023    _______________________________________________________________________     Anticoagulation Episode Summary     Current INR goal:  2.0-3.0   TTR:  87.2 % (1 y)   Target end date:  Indefinite   Send INR reminders to:  [facility]    Indications    Long-term (current) use of anticoagulants [Z79.01] [Z79.01]  Atrial fibrillation (H) [I48.91]  Antiphospholipid antibody syndrome (H) (Resolved) [D68.61]  Personal history of DVT (deep vein thrombosis) (Resolved) [Z86.718]  Atrial fibrillation  unspecified type (H) (Resolved) [I48.91]  Paroxysmal atrial fibrillation (H) [I48.0]  Chronic atrial fibrillation (H) [I48.20]  Personal history of PE (pulmonary embolism) [Z86.711]           Comments:  JESSICA rodas to manage by exception         Anticoagulation Care Providers     Provider Role Specialty Phone number    Anya Nowak MD Referring Family Medicine [phone]

## 2023-03-28 ENCOUNTER — PATIENT OUTREACH (OUTPATIENT)
Dept: CARE COORDINATION | Facility: CLINIC | Age: 73
End: 2023-03-28
Payer: COMMERCIAL

## 2023-03-28 NOTE — PROGRESS NOTES
Addended by: CELESTINA PINEDA on: 4/16/2021 10:19 AM     Modules accepted: Orders     Clinic Care Coordination Contact  Care Coordination Clinician Chart Review    Situation: Patient chart reviewed by Care Coordinator.       Background: Care Coordination Program started: 11/5/2021. Initial assessment completed and patient-centered care plan(s) were developed with participation from patient. Lead CC handed patient off to CHW for continued outreaches.       Assessment: Per chart review, patient outreach completed by CC CHW on 3/14/23.  Patient is actively working to accomplish goal(s). Patient's goal(s) appropriate and relevant at this time. Patient is due for updated Plan of Care.  Assessments will be completed annually or as needed/with change of patient status.      Care Plan: General  Work on increasing strength and stamina for a year after reaching 100%     Problem: HP GENERAL PROBLEM     Goal: General  work on walking without the walker, and increasing my strength and stamina.  For at least 1 year after reaching 100%.     Start Date: 12/13/2021 Expected End Date: 6/13/2024    Recent Progress: 60%    Note:     Barriers: suffers from parkinson's  Strengths: engaged in care coordination  Patient expressed understanding of goal: yes  Action steps to achieve this goal:  1. I will continue walking without the walker. No longer using the walker as of 3/15/22. Completed action step  2. I will go outside without the walker when I feel strong enough and have increased my strength and stamina.  Continue to work on for at least a year.  3. I will frequently walk around inside the house to increase my strength and stamina.  Continue to work on for at least a year.                    Care Plan: General - Working on balance and items in the path,     Problem: HP GENERAL PROBLEM     Goal: General Goal - work on balance and avoiding items in the path     Start Date: 10/28/2022 Expected End Date: 10/28/2023    This Visit's Progress: 40% Recent Progress: 20%    Note:     Barriers: parkinson's  Strengths: engaged in  care coordination  Patient expressed understanding of goal: yes   Action steps to achieve this goal:  1. I will work on walking around items in my path without losing my balance.  2. I will work on maintaining my balance when I walk through the house.  3. I will use my walker or cane as needed to maintain my balance.                             Plan/Recommendations: The patient will continue working with Care Coordination to achieve goal(s) as above. CHW will continue outreaches at minimum every 30 days and will involve Lead CC as needed or if patient is ready to move to Maintenance. Lead CC will continue to monitor CHW outreaches and patient's progress to goal(s) every 6 weeks.     Plan of Care updated and sent to patient: Yes, via [email or web address].          Uriel Zhou MSN, RN, PHN, CCM   Primary Care Clinical RN Care Coordinator  [facility]  3/28/2023   10:18 AM  [email or web address]  Office: [phone]

## 2023-03-28 NOTE — LETTER
[facility]  Patient Centered Plan of Care  About Me:        Patient Name:  Haresh Granados    YOB: 1950  Age:         72 year old   [employer] MRN:    [id number] Telephone Information:  Home Phone [phone]   Mobile [phone]       Address:  [address] [place] [address] Email address:  [email or web address]      Emergency Contact(s)    Name Relationship Lgl Grd Work Phone Home Phone Mobile Phone   1. JANICE GRANADOS Spouse No   [phone]   2. LAY GRANADOS Daughter No   [phone]   3. SOL GRANADOS Son No   [phone]           Primary language:  English     needed? No   [employer]:  [phone] op. 1  Other communication barriers:Glasses; Language barrier    Preferred Method of Communication:  Mail  Current living arrangement: I live in a private home with family    Mobility Status/ Medical Equipment: Independent        Health Maintenance  Health Maintenance Reviewed: Due/Overdue   Health Maintenance Due   Topic Date Due     HF ACTION PLAN  Never done     ZOSTER IMMUNIZATION (1 of 2) 11/14/2016     EYE EXAM  01/01/2022     TSH W/FREE T4 REFLEX  08/20/2022     DTAP/TDAP/TD IMMUNIZATION (2 - Td or Tdap) 09/11/2022     CBC  01/04/2023     BMP  04/04/2023           My Access Plan  Medical Emergency 911   Primary Clinic Line [facility] - [phone]   24 Hour Appointment Line [phone] or  [phone]) (toll-free)   24 Hour Nurse Line [phone] (toll-free)   Preferred Urgent Care No data recorded   Preferred Hospital [facility]  [phone]     Preferred Pharmacy [id number] [place] [facility] [address] [facility]     Behavioral Health Crisis Line The National Suicide Prevention Lifeline at 1-893.706.4095 or Text/Call 028             My Care Team Members  Patient Care Team       Relationship Specialty Notifications Start End    Anya Nowak,  MD PCP - General Family Practice  4/23/19     Phone: [phone] Fax: [phone] [address] [facility]. [place] [address]    Augusto Miller MD MD Hematology Admissions 9/30/11     Referred to Endocrinology    Phone: [phone] Fax: [phone]         [address] [facility] [address]    Jesusita Mejia PA-C Physician Assistant   3/21/12     Phone: [phone] Fax: [phone]         [address] [facility] [address] [facility] [address]    Pino Sharma MD MD Internal Medicine  4/11/16     Phone: [phone] Fax: [phone]         [address] [facility] [address] [facility] [address]    Fabi Jimenez MD MD INTERNAL MEDICINE - ENDOCRINOLOGY, DIABETES & METABOLISM  4/18/16     Phone: [phone] Fax: [phone]         [address] [facility] [address] [facility] [address]    Bekah Matt MD MD Cardiology  8/12/16     Phone: [phone] Fax: [phone]         [address] [place] [address] [address] [facility] [address]    Tina Mejia RN Specialty Care Coordinator Cardiology  3/6/19     Phone: [phone] Fax: [phone]         [address] [facility] [address]    Anya Nowak MD Assigned PCP   4/28/19     Phone: [phone] Fax: [phone] [address] [facility]. [place] [address]    Bekah Matt MD Assigned Heart and Vascular Provider   10/23/20     Phone: [phone] Fax: [phone]         [address] [facility] [address] [place] [address]    Ángel Hill MD Assigned Neuroscience Provider   11/15/20     Phone: [phone] Fax: [phone]         [address] [place] [address]    Rosalva Samuels MD MD Family Medicine  6/1/21     Phone: [phone] Fax: [phone]         [address] [place] [address]    Bekah Matt MD MD Clinical Cardiac Electrophysiology  9/10/21     Phone: [phone] Fax: [phone]         [address] [place] [address]    Marcelo Jacques MD Assigned Infectious Disease Provider   9/12/21      Phone: [phone] Fax: [phone]         [address] [facility] [address]    Uriel Van, RN Lead Care Coordinator Primary Care - CC Admissions 11/5/21     Phone: [phone] Fax: [phone]        Lizbeth Chen [facility] Pharmacist Pharmacist Ambulatory Care  11/12/21     Phone: [phone] [address] [facility] [address]    Vahid Edward MD Assigned Pulmonology Provider   12/12/21     Phone: [phone] Fax: [phone]         [address] [place] [address]    Leslie Clifford [facility] Pharmacist Pharmacist  12/13/21     Phone: [phone] Pager: [phone]         [address] [facility] [address]    Bonnie Walls RN Specialty Care Coordinator Hematology & Oncology  12/16/21     Phone: [phone] Pager: [phone]        Maye Edmondson [facility] Worker Primary Care - CC Admissions 4/18/22     Phone: [phone] Fax: [phone]        Reg Cantu MD MD Critical Care  6/9/22     Phone: [phone] Fax: [phone]         [address] [place] [address]    Lizbeth Chen [facility] Assigned MTM Pharmacist   9/28/22     Phone: [phone]          [address] [facility] [address]    Reg Cantu MD Assigned Surgical Provider   2/18/23     Phone: [phone] Fax: [phone]         [address] [place] [address]            My Care Plans  Self Management and Treatment Plan  Care Plan  Care Plan: General  Work on increasing strength and stamina for a year after reaching 100%     Problem: HP GENERAL PROBLEM     Goal: General  work on walking without the walker, and increasing my strength and stamina.  For at least 1 year after reaching 100%.     Start Date: 12/13/2021 Expected End Date: 6/13/2024    Recent Progress: 60%    Note:     Barriers: suffers from parkinson's  Strengths: engaged in care coordination  Patient expressed understanding of goal: yes  Action steps to achieve this goal:  1. I  will continue walking without the walker. No longer using the walker as of 3/15/22. Completed action step  2. I will go outside without the walker when I feel strong enough and have increased my strength and stamina.  Continue to work on for at least a year.  3. I will frequently walk around inside the house to increase my strength and stamina.  Continue to work on for at least a year.                    Care Plan: General - Working on balance and items in the path,     Problem: HP GENERAL PROBLEM     Goal: General Goal - work on balance and avoiding items in the path     Start Date: 10/28/2022 Expected End Date: 10/28/2023    This Visit's Progress: 40% Recent Progress: 20%    Note:     Barriers: parkinson's  Strengths: engaged in care coordination  Patient expressed understanding of goal: yes   Action steps to achieve this goal:  1. I will work on walking around items in my path without losing my balance.  2. I will work on maintaining my balance when I walk through the house.  3. I will use my walker or cane as needed to maintain my balance.                           Action Plans on File:                       Advance Care Plans/Directives Type:   Advanced Directive - On File      My Medical and Care Information  Problem List   Patient Active Problem List   Diagnosis     Hemochromatosis     Qqezt-dfawtv-ygzo disease, chronic (H)     Advanced directives, counseling/discussion     Polyneuropathy     Status post total knee replacements     Status post bone marrow transplant (H)     Hyperlipidemia with target LDL less than 100     Atrial fibrillation (H)     Chronic rhinitis     Gallstones without obstruction of gallbladder     Long-term (current) use of anticoagulants [Z79.01]     Thyroid nodule     Type 2 diabetes mellitus with stage 2 chronic kidney disease, without long-term current use of insulin (H)     History of Hodgkin's lymphoma     History of irradiation, presenting hazards to health     Primary pulmonary  hypertension (H)     Hereditary hemochromatosis (H)     Oropharyngeal dysphagia     Articulation disorder     Personal history of PE (pulmonary embolism)     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    Care Coordination Start Date: 11/5/2021   Frequency of Care Coordination: monthly     Form Last Updated: 03/28/2023

## 2023-03-29 RX ORDER — FLECAINIDE ACETATE 50 MG/1
TABLET ORAL
Qty: 180 TABLET | Refills: 2 | Status: SHIPPED | OUTPATIENT
Start: 2023-03-29 | End: 2024-01-10

## 2023-03-29 NOTE — TELEPHONE ENCOUNTER
Signed Prescriptions:                        Disp   Refills    flecainide (TAMBOCOR) 50 MG tablet         180 ta*2        Sig: TAKE 1 TABLET TWICE A DAY  Authorizing Provider: JOEL MATA  Ordering User: TIFFANY MCKEON    Rx filled per Dr. Mata.    Tiffany Mckeon, RN  Cardiology Care Coordinator  [facility]  [phone] option 1

## 2023-03-31 ENCOUNTER — TRANSFERRED RECORDS (OUTPATIENT)
Dept: HEALTH INFORMATION MANAGEMENT | Facility: CLINIC | Age: 73
End: 2023-03-31
Payer: COMMERCIAL

## 2023-03-31 ENCOUNTER — ANTICOAGULATION THERAPY VISIT (OUTPATIENT)
Dept: ANTICOAGULATION | Facility: CLINIC | Age: 73
End: 2023-03-31
Payer: COMMERCIAL

## 2023-03-31 DIAGNOSIS — I48.20 CHRONIC ATRIAL FIBRILLATION (H): ICD-10-CM

## 2023-03-31 DIAGNOSIS — Z79.01 LONG TERM CURRENT USE OF ANTICOAGULANT THERAPY: Primary | ICD-10-CM

## 2023-03-31 DIAGNOSIS — I48.0 PAROXYSMAL ATRIAL FIBRILLATION (H): ICD-10-CM

## 2023-03-31 DIAGNOSIS — Z86.711 PERSONAL HISTORY OF PE (PULMONARY EMBOLISM): ICD-10-CM

## 2023-03-31 LAB — INR (EXTERNAL): 2.7 (ref 0.9–1.1)

## 2023-03-31 NOTE — PROGRESS NOTES
ANTICOAGULATION MANAGEMENT     Haresh Rock 72 year old male is on warfarin with therapeutic INR result. (Goal INR 2.0-3.0)    Recent labs: (last 7 days)     03/31/23  1551   INR 2.7*       ASSESSMENT       Source(s): Chart Review and Patient/Caregiver Call       Warfarin doses taken: Warfarin taken as instructed    Diet: No new diet changes identified    New illness, injury, or hospitalization: No    Medication/supplement changes: None noted    Signs or symptoms of bleeding or clotting: No    Previous INR: Therapeutic last 2(+) visits    Additional findings: None         PLAN     Recommended plan for no diet, medication or health factor changes affecting INR     Dosing Instructions: Continue your current warfarin dose with next INR in 1 week       Summary  As of 3/31/2023    Full warfarin instructions:  2.5 mg every Tue, Fri; 3.75 mg all other days   Next INR check:  4/7/2023             Telephone call with Haresh who verbalizes understanding and agrees to plan and who agrees to plan and repeated back plan correctly    Patient to recheck with home meter    Education provided:     Please call back if any changes to your diet, medications or how you've been taking warfarin    Plan made per [facility] anticoagulation protocol    Belinda Lafleur, RN  Anticoagulation Clinic  3/31/2023    _______________________________________________________________________     Anticoagulation Episode Summary     Current INR goal:  2.0-3.0   TTR:  87.2 % (1 y)   Target end date:  Indefinite   Send INR reminders to:  [facility]    Indications    Long-term (current) use of anticoagulants [Z79.01] [Z79.01]  Atrial fibrillation (H) [I48.91]  Antiphospholipid antibody syndrome (H) (Resolved) [D68.61]  Personal history of DVT (deep vein thrombosis) (Resolved) [Z86.718]  Atrial fibrillation  unspecified type (H) (Resolved) [I48.91]  Paroxysmal atrial fibrillation (H) [I48.0]  Chronic atrial fibrillation (H) [I48.20]  Personal history  of PE (pulmonary embolism) [Z86.711]           Comments:  JESSICA okay to manage by exception         Anticoagulation Care Providers     Provider Role Specialty Phone number    Anya Nowak MD Referring Family Medicine [phone]

## 2023-04-02 DIAGNOSIS — G20.A1 PARKINSON'S DISEASE (H): ICD-10-CM

## 2023-04-02 DIAGNOSIS — Z85.71 HISTORY OF HODGKIN'S LYMPHOMA: Primary | ICD-10-CM

## 2023-04-02 DIAGNOSIS — E83.110 HEREDITARY HEMOCHROMATOSIS (H): ICD-10-CM

## 2023-04-02 DIAGNOSIS — Z79.01 LONG TERM CURRENT USE OF ANTICOAGULANT THERAPY: ICD-10-CM

## 2023-04-04 ENCOUNTER — ANTICOAGULATION THERAPY VISIT (OUTPATIENT)
Dept: ANTICOAGULATION | Facility: CLINIC | Age: 73
End: 2023-04-04

## 2023-04-04 ENCOUNTER — OFFICE VISIT (OUTPATIENT)
Dept: TRANSPLANT | Facility: CLINIC | Age: 73
End: 2023-04-04
Attending: INTERNAL MEDICINE
Payer: COMMERCIAL

## 2023-04-04 ENCOUNTER — APPOINTMENT (OUTPATIENT)
Dept: LAB | Facility: CLINIC | Age: 73
End: 2023-04-04
Attending: INTERNAL MEDICINE
Payer: COMMERCIAL

## 2023-04-04 VITALS
TEMPERATURE: 98 F | SYSTOLIC BLOOD PRESSURE: 123 MMHG | RESPIRATION RATE: 16 BRPM | HEART RATE: 59 BPM | DIASTOLIC BLOOD PRESSURE: 69 MMHG | BODY MASS INDEX: 23.99 KG/M2 | WEIGHT: 172 LBS | OXYGEN SATURATION: 98 %

## 2023-04-04 DIAGNOSIS — I48.0 PAROXYSMAL ATRIAL FIBRILLATION (H): ICD-10-CM

## 2023-04-04 DIAGNOSIS — Z79.01 LONG TERM CURRENT USE OF ANTICOAGULANT THERAPY: Primary | ICD-10-CM

## 2023-04-04 DIAGNOSIS — E83.110 HEREDITARY HEMOCHROMATOSIS (H): ICD-10-CM

## 2023-04-04 DIAGNOSIS — Z85.71 HISTORY OF HODGKIN'S LYMPHOMA: ICD-10-CM

## 2023-04-04 DIAGNOSIS — I48.20 CHRONIC ATRIAL FIBRILLATION (H): ICD-10-CM

## 2023-04-04 DIAGNOSIS — G20.A1 PARKINSON'S DISEASE (H): ICD-10-CM

## 2023-04-04 DIAGNOSIS — E55.9 VITAMIN D DEFICIENCY: ICD-10-CM

## 2023-04-04 DIAGNOSIS — Z79.01 LONG TERM CURRENT USE OF ANTICOAGULANT THERAPY: ICD-10-CM

## 2023-04-04 DIAGNOSIS — Z86.711 PERSONAL HISTORY OF PE (PULMONARY EMBOLISM): ICD-10-CM

## 2023-04-04 LAB
ALBUMIN SERPL BCG-MCNC: 3.9 G/DL (ref 3.5–5.2)
ALP SERPL-CCNC: 111 U/L (ref 40–129)
ALT SERPL W P-5'-P-CCNC: <5 U/L (ref 10–50)
ANION GAP SERPL CALCULATED.3IONS-SCNC: 8 MMOL/L (ref 7–15)
AST SERPL W P-5'-P-CCNC: 41 U/L (ref 10–50)
BASOPHILS # BLD AUTO: 0 10E3/UL (ref 0–0.2)
BASOPHILS NFR BLD AUTO: 0 %
BILIRUB SERPL-MCNC: 0.9 MG/DL
BUN SERPL-MCNC: 31 MG/DL (ref 8–23)
CALCIUM SERPL-MCNC: 9.2 MG/DL (ref 8.8–10.2)
CHLORIDE SERPL-SCNC: 98 MMOL/L (ref 98–107)
CREAT SERPL-MCNC: 0.98 MG/DL (ref 0.67–1.17)
CRP SERPL-MCNC: 4.01 MG/L
DEPRECATED HCO3 PLAS-SCNC: 28 MMOL/L (ref 22–29)
EOSINOPHIL # BLD AUTO: 0 10E3/UL (ref 0–0.7)
EOSINOPHIL NFR BLD AUTO: 1 %
ERYTHROCYTE [DISTWIDTH] IN BLOOD BY AUTOMATED COUNT: 12 % (ref 10–15)
FERRITIN SERPL-MCNC: 118 NG/ML (ref 31–409)
GFR SERPL CREATININE-BSD FRML MDRD: 81 ML/MIN/1.73M2
GLUCOSE SERPL-MCNC: 126 MG/DL (ref 70–99)
HCT VFR BLD AUTO: 44.2 % (ref 40–53)
HGB BLD-MCNC: 15.6 G/DL (ref 13.3–17.7)
IMM GRANULOCYTES # BLD: 0 10E3/UL
IMM GRANULOCYTES NFR BLD: 0 %
INR PPP: 3.06 (ref 0.85–1.15)
IRON BINDING CAPACITY (ROCHE): 249 UG/DL (ref 240–430)
IRON SATN MFR SERPL: 59 % (ref 15–46)
IRON SERPL-MCNC: 146 UG/DL (ref 61–157)
LDH SERPL L TO P-CCNC: 197 U/L (ref 0–250)
LYMPHOCYTES # BLD AUTO: 2.2 10E3/UL (ref 0.8–5.3)
LYMPHOCYTES NFR BLD AUTO: 28 %
MCH RBC QN AUTO: 34.2 PG (ref 26.5–33)
MCHC RBC AUTO-ENTMCNC: 35.3 G/DL (ref 31.5–36.5)
MCV RBC AUTO: 97 FL (ref 78–100)
MONOCYTES # BLD AUTO: 0.7 10E3/UL (ref 0–1.3)
MONOCYTES NFR BLD AUTO: 8 %
NEUTROPHILS # BLD AUTO: 5 10E3/UL (ref 1.6–8.3)
NEUTROPHILS NFR BLD AUTO: 63 %
NRBC # BLD AUTO: 0 10E3/UL
NRBC BLD AUTO-RTO: 0 /100
PLATELET # BLD AUTO: 80 10E3/UL (ref 150–450)
POTASSIUM SERPL-SCNC: 4.8 MMOL/L (ref 3.4–5.3)
PROT SERPL-MCNC: 7.2 G/DL (ref 6.4–8.3)
RBC # BLD AUTO: 4.56 10E6/UL (ref 4.4–5.9)
RETICS # AUTO: 0.08 10E6/UL (ref 0.03–0.1)
RETICS/RBC NFR AUTO: 1.8 % (ref 0.5–2)
SODIUM SERPL-SCNC: 134 MMOL/L (ref 136–145)
TOTAL PROTEIN SERUM FOR ELP: 7 G/DL (ref 6.4–8.3)
WBC # BLD AUTO: 8 10E3/UL (ref 4–11)

## 2023-04-04 PROCEDURE — 84165 PROTEIN E-PHORESIS SERUM: CPT | Mod: 26

## 2023-04-04 PROCEDURE — 84155 ASSAY OF PROTEIN SERUM: CPT | Mod: 91 | Performed by: INTERNAL MEDICINE

## 2023-04-04 PROCEDURE — 85610 PROTHROMBIN TIME: CPT | Performed by: INTERNAL MEDICINE

## 2023-04-04 PROCEDURE — 86140 C-REACTIVE PROTEIN: CPT | Performed by: INTERNAL MEDICINE

## 2023-04-04 PROCEDURE — G0463 HOSPITAL OUTPT CLINIC VISIT: HCPCS | Performed by: INTERNAL MEDICINE

## 2023-04-04 PROCEDURE — 99214 OFFICE O/P EST MOD 30 MIN: CPT | Performed by: INTERNAL MEDICINE

## 2023-04-04 PROCEDURE — 84165 PROTEIN E-PHORESIS SERUM: CPT | Mod: TC | Performed by: PATHOLOGY

## 2023-04-04 PROCEDURE — 80053 COMPREHEN METABOLIC PANEL: CPT | Performed by: INTERNAL MEDICINE

## 2023-04-04 PROCEDURE — 83615 LACTATE (LD) (LDH) ENZYME: CPT | Performed by: INTERNAL MEDICINE

## 2023-04-04 PROCEDURE — 83550 IRON BINDING TEST: CPT | Performed by: INTERNAL MEDICINE

## 2023-04-04 PROCEDURE — 85004 AUTOMATED DIFF WBC COUNT: CPT | Performed by: INTERNAL MEDICINE

## 2023-04-04 PROCEDURE — 99207 BLOOD MORPHOLOGY PATHOLOGIST REVIEW: CPT | Performed by: PATHOLOGY

## 2023-04-04 PROCEDURE — 85045 AUTOMATED RETICULOCYTE COUNT: CPT | Performed by: INTERNAL MEDICINE

## 2023-04-04 PROCEDURE — 36415 COLL VENOUS BLD VENIPUNCTURE: CPT | Performed by: INTERNAL MEDICINE

## 2023-04-04 PROCEDURE — 82728 ASSAY OF FERRITIN: CPT | Performed by: INTERNAL MEDICINE

## 2023-04-04 ASSESSMENT — PAIN SCALES - GENERAL: PAINLEVEL: NO PAIN (0)

## 2023-04-04 NOTE — NURSING NOTE
"Oncology Rooming Note    April 4, 2023 9:23 AM   Haresh Rock is a 72 year old male who presents for:    Chief Complaint   Patient presents with     Blood Draw     Labs drawn by RN via , vitals taken.     Oncology Clinic Visit     History of Hodgkin's lymphoma; Status post bone marrow transplant      Initial Vitals: /69   Pulse 59   Temp 98  F (36.7  C)   Resp 16   Wt 78 kg (172 lb)   SpO2 98%   BMI 23.99 kg/m   Estimated body mass index is 23.99 kg/m  as calculated from the following:    Height as of 12/28/22: 1.803 m (5' 11\").    Weight as of this encounter: 78 kg (172 lb). Body surface area is 1.98 meters squared.  No Pain (0) Comment: Data Unavailable   No LMP for male patient.  Allergies reviewed: Yes  Medications reviewed: Yes    Medications: Medication refills not needed today.  Pharmacy name entered into [facility]:    [id number] [place] - [address] [facility], [address] [facility] [place] - [place] - [address] [facility]    Clinical concerns: none.       Raul Luis"

## 2023-04-04 NOTE — PROGRESS NOTES
Haresh [facility] the [facility] for history of a JAK2 positive atypical myeloproliferative syndrome, status post nonmyeloablative allo-sib peripheral blood stem cell transplant in 2007, complicated by chronic nilis-zabrrx-fnuh disease, cirrhosis, hemochromatosis with C282Y homozygosity, pulmonary emboli and DVT as well as paroxysmal atrial fibrillation for which he is on warfarin, Hodgkin disease in 2011 s/p ABVD x 5 cycles, and a history of cardiac arrhythmias with an ablation. He was  diagnosed by Dr. Ángel Hill with Parkinson disease, with weight loss, dysphagia with aspiration, requiring G tube placement, orthostasis, urinary obstruction. Most recent CT and PET 2/26/21 were without evidence for recurrence of malignancy. Hypermetabolic activity at the anorectal junction with diffuse wall thickening was worked up by 3/17/21 flex sigmoidoscopy which showed a 4 mm polyp (removed, tubular adenoma), and erythematous mucosa that was biopsied and found superficial vascular congestion and melanosis coli without evidence for GVHD. Had full colonoscopy 4/14/21 without other findings. He had TURP with Dr. Hawley for urinary obstruction 8/9/21, as medical therapy was causing too much orthostasis.  Haresh was hospitalized in October and had a small bowel resection because of a G tube complication. G tube was replaced. He receives 2200 dee per day with tube feeding and every once in a while tries to eat but then aspirates. He can drink 1/2 cup Mountain due per day.     INTERVAL HISTORY  Patient reports presenting for a regular check in without acute complaints. Reports baseline dysphagia with solid food, tries to drink mountain dew for comfort. Otherwise caloric need met with tube feeds. Reports polyuria and nocturia. Also reports increased morning stiffness and pain in the lower back and hip area. Denies any new bleeding, trauma, bleeding with stool or urination.      PAST MEDICAL HISTORY:  As above See 10/2022  note     SOCIAL HISTORY:  See my note from 10/2022     FAMILY HISTORY:  See my note from  10/2022     REVIEW OF SYSTEMS:  A 12-point review of systems is done and is negative, except as in the HPI.      PHYSICAL EXAMINATION: BP, HR, temp and oxygen saturationWNL  General: Notable flat affect and low decibel level speech.   EYES:  Grossly normal to inspection, no discharge, erythema or icterus.   SKIN:   skin is clear.  No significant rash or abnormal pigmentation.   Cardiac: RRR s1 and S2 heard   Pulm: vesicular breath sounds bilaterally   NEUROLOGIC:  Cranial nerves seem to be intact.  Mentation and speech is appropriate for age. minimal resting tremor.  voice is stable.No cogwheeling on flexing  Minimal resting tremor  PSYCHIATRIC:  Mentation appears normal.  He does not seem anxious today.     ASSESSMENT:     1.  Myeloproliferative syndrome/MDS. STEVIE 2 positive  2.  Status post non-myeloablative allo-sib peripheral blood stem cell transplant 2007  3.  History of chronic uxosf-mwehjs-zald disease.   4.  History of Hodgkin's disease 2011 s/p ABVD x 5 cycles.   5.  History of cardiac arrhythmias, SVT and V tach.   6.  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   7.  History of cirrhosis.   8.  History of pulmonary emboli.   9.  History of elevated hemoglobin A1c.   10. Thyroid nodule.    11. Cholelithiasis.     12. Diverticulosis.   13. Anticoagulation with warfarin for history of DVT, PE, and paroxysmal atrial fibrillation  14. Status post bilateral knee replacement.     15. Aortic stenosis  16. 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  17. Seborrheic keratosis of the back.  18. Weight loss, dysphagia, anorexia related to Parkinson's  19. Parkinson's  20. 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 Chronic aspiration related to dysphagia,  22. Thrombocytopenia     ASSESSMENT:    Seems to be improved with weight gain from 136 in November to 172  today, with tube feeds.    No evidence of Hodgkin's  at this time.Platelet count has been dropping over last couple years Will review blood smear. B12 was OK recently.  Not sure if this Is due to flecainide. Will monitor bleeding with him on warfarin. Offered f/u with urology and hip x rays for polyuria and hip pain. Patient declined both at this time.      RTC 2 months  With labs     I spent a total of 30 minutes face to face with Haresh Rock during today's office visit. Over 50% of this time was spent counseling the patient and coordinating the care regarding Hodgkins and BMT and 10 minutes preparing to see the patient and  care coordination      Augusto Miller MD  [phone]

## 2023-04-04 NOTE — PROGRESS NOTES
ANTICOAGULATION MANAGEMENT     Haresh Rock 72 year old male is on warfarin with therapeutic INR result. (Goal INR 2.0-3.0)    Recent labs: (last 7 days)     04/04/23  0917   INR 3.06*       ASSESSMENT       Source(s): Chart Review and Patient/Caregiver Call       Warfarin doses taken: Warfarin taken as instructed    Diet: No new diet changes identified    New illness, injury, or hospitalization: No    Medication/supplement changes: None noted    Signs or symptoms of bleeding or clotting: No    Previous INR: Therapeutic last 2(+) visits    Additional findings: Rechecking sooner than protocol requires due to patient preference to stay on Friday testing.         PLAN     Recommended plan for no diet, medication or health factor changes affecting INR     Dosing Instructions: Continue your current warfarin dose with next INR in 3 days    Summary  As of 4/4/2023    Full warfarin instructions:  2.5 mg every Tue, Fri; 3.75 mg all other days   Next INR check:  4/7/2023             Telephone call with Haresh who agrees to plan and repeated back plan correctly    Patient to recheck with home meter    Education provided:     Please call back if any changes to your diet, medications or how you've been taking warfarin    Goal range and lab monitoring: goal range and significance of current result and Importance of following up at instructed interval    Contact [phone]  with any changes, questions or concerns.     Plan made per [facility] anticoagulation protocol    Gabriela Sosa RN  Anticoagulation Clinic  4/4/2023    _______________________________________________________________________     Anticoagulation Episode Summary     Current INR goal:  2.0-3.0   TTR:  87.0 % (1 y)   Target end date:  Indefinite   Send INR reminders to:  [facility]IT[facility]    Indications    Long-term (current) use of anticoagulants [Z79.01] [Z79.01]  Atrial fibrillation (H) [I48.91]  Antiphospholipid antibody syndrome (H) (Resolved)  [D68.61]  Personal history of DVT (deep vein thrombosis) (Resolved) [Z86.718]  Atrial fibrillation  unspecified type (H) (Resolved) [I48.91]  Paroxysmal atrial fibrillation (H) [I48.0]  Chronic atrial fibrillation (H) [I48.20]  Personal history of PE (pulmonary embolism) [Z86.711]           Comments:  JESSICA rodas to manage by exception         Anticoagulation Care Providers     Provider Role Specialty Phone number    Anya Nowak MD Referring Family Medicine [phone]

## 2023-04-04 NOTE — NURSING NOTE
Chief Complaint   Patient presents with     Blood Draw     Labs drawn by RN via , vitals taken.     Labs collected from venipuncture by RN. Vitals taken. Checked in for appointment(s).    Key Reyes RN

## 2023-04-04 NOTE — LETTER
4/4/2023         RE: Haresh Rock  [address] [address]        Dear Colleague,    Thank you for referring your patient, Haresh Rock, to the [facility] [place]. Please see a copy of my visit note below.    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 He was  diagnosed by Dr. Ángel Hill with Parkinson disease, with weight loss, dysphagia with aspiration, requiring G tube placement, orthostasis, urinary obstruction. Most recent CT and PET 2/26/21 were without evidence for recurrence of malignancy. Hypermetabolic activity at the anorectal junction with diffuse wall thickening was worked up by 3/17/21 flex sigmoidoscopy which showed a 4 mm polyp (removed, tubular adenoma), and erythematous mucosa that was biopsied and found superficial vascular congestion and melanosis coli without evidence for GVHD. Had full colonoscopy 4/14/21 without other findings. He had TURP with Dr. Hawley for urinary obstruction 8/9/21, as medical therapy was causing too much orthostasis.  Haresh was hospitalized in October and had a small bowel resection because of a G tube complication. G tube was replaced. He receives 2200 dee per day with tube feeding and every once in a while tries to eat but then aspirates. He can drink 1/2 cup Mountain due per day.     INTERVAL HISTORY  Patient reports presenting for a regular check in without acute complaints. Reports baseline dysphagia with solid food, tries to drink mountain dew for  comfort. Otherwise caloric need met with tube feeds. Reports polyuria and nocturia. Also reports increased morning stiffness and pain in the lower back and hip area. Denies any new bleeding, trauma, bleeding with stool or urination.      PAST MEDICAL HISTORY:  As above See 10/2022 note     SOCIAL HISTORY:  See my note from 10/2022     FAMILY HISTORY:  See my note from  10/2022     REVIEW OF SYSTEMS:  A 12-point review of systems is done and is negative, except as in the HPI.      PHYSICAL EXAMINATION: BP, HR, temp and oxygen saturationWNL  General: Notable flat affect and low decibel level speech.   EYES:  Grossly normal to inspection, no discharge, erythema or icterus.   SKIN:   skin is clear.  No significant rash or abnormal pigmentation.   Cardiac: RRR s1 and S2 heard   Pulm: vesicular breath sounds bilaterally   NEUROLOGIC:  Cranial nerves seem to be intact.  Mentation and speech is appropriate for age. minimal resting tremor.  voice is stable.No cogwheeling on flexing  Minimal resting tremor  PSYCHIATRIC:  Mentation appears normal.  He does not seem anxious today.     ASSESSMENT:     1.  Myeloproliferative syndrome/MDS. STEVIE 2 positive  2.  Status post non-myeloablative allo-sib peripheral blood stem cell transplant 2007  3.  History of chronic mfdmi-dujrkk-vpbb disease.   4.  History of Hodgkin's disease 2011 s/p ABVD x 5 cycles.   5.  History of cardiac arrhythmias, SVT and V tach.   6.  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   7.  History of cirrhosis.   8.  History of pulmonary emboli.   9.  History of elevated hemoglobin A1c.   10. Thyroid nodule.    11. Cholelithiasis.     12. Diverticulosis.   13. Anticoagulation with warfarin for history of DVT, PE, and paroxysmal atrial fibrillation  14. Status post bilateral knee replacement.     15. Aortic stenosis  16. 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  17. Seborrheic keratosis of the  back.  18. Weight loss, dysphagia, anorexia related to Parkinson's  19. Parkinson's  20. 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 Chronic aspiration related to dysphagia,  22. Thrombocytopenia     ASSESSMENT:    Seems to be improved with weight gain from 136 in November to 172 today, with tube feeds.    No evidence of Hodgkin's  at this time.Platelet count has been dropping over last couple years Will review blood smear. B12 was OK recently.  Not sure if this Is due to flecainide. Will monitor bleeding with him on warfarin. Offered f/u with urology and hip x rays for polyuria and hip pain. Patient declined both at this time.      RTC 2 months  With labs     I spent a total of 30 minutes face to face with Haresh Rock during today's office visit. Over 50% of this time was spent counseling the patient and coordinating the care regarding Hodgkins and BMT and 10 minutes preparing to see the patient and  care coordination      Augusto Miller MD  [phone]

## 2023-04-05 LAB
ALBUMIN SERPL ELPH-MCNC: 3.7 G/DL (ref 3.7–5.1)
ALPHA1 GLOB SERPL ELPH-MCNC: 0.3 G/DL (ref 0.2–0.4)
ALPHA2 GLOB SERPL ELPH-MCNC: 0.6 G/DL (ref 0.5–0.9)
B-GLOBULIN SERPL ELPH-MCNC: 0.9 G/DL (ref 0.6–1)
GAMMA GLOB SERPL ELPH-MCNC: 1.5 G/DL (ref 0.7–1.6)
M PROTEIN SERPL ELPH-MCNC: 0 G/DL
PATH REPORT.COMMENTS IMP SPEC: NORMAL
PATH REPORT.FINAL DX SPEC: NORMAL
PATH REPORT.MICROSCOPIC SPEC OTHER STN: NORMAL
PATH REPORT.MICROSCOPIC SPEC OTHER STN: NORMAL
PATH REPORT.RELEVANT HX SPEC: NORMAL
PROT PATTERN SERPL ELPH-IMP: NORMAL

## 2023-04-07 ENCOUNTER — ANTICOAGULATION THERAPY VISIT (OUTPATIENT)
Dept: ANTICOAGULATION | Facility: CLINIC | Age: 73
End: 2023-04-07
Payer: COMMERCIAL

## 2023-04-07 ENCOUNTER — TRANSFERRED RECORDS (OUTPATIENT)
Dept: HEALTH INFORMATION MANAGEMENT | Facility: CLINIC | Age: 73
End: 2023-04-07
Payer: COMMERCIAL

## 2023-04-07 DIAGNOSIS — I48.20 CHRONIC ATRIAL FIBRILLATION (H): ICD-10-CM

## 2023-04-07 DIAGNOSIS — I48.0 PAROXYSMAL ATRIAL FIBRILLATION (H): ICD-10-CM

## 2023-04-07 DIAGNOSIS — Z79.01 LONG TERM CURRENT USE OF ANTICOAGULANT THERAPY: Primary | ICD-10-CM

## 2023-04-07 DIAGNOSIS — Z86.711 PERSONAL HISTORY OF PE (PULMONARY EMBOLISM): ICD-10-CM

## 2023-04-07 LAB — INR (EXTERNAL): 2.5 (ref 0.9–1.1)

## 2023-04-07 NOTE — PROGRESS NOTES
ANTICOAGULATION MANAGEMENT     Haresh Rock 72 year old male is on warfarin with therapeutic INR result. (Goal INR 2.0-3.0)    Recent labs: (last 7 days)     04/07/23  1109   INR 2.5*       ASSESSMENT       Source(s): Chart Review and Patient/Caregiver Call       Warfarin doses taken: Warfarin taken as instructed    Diet: No new diet changes identified    New illness, injury, or hospitalization: No    Medication/supplement changes: None noted    Signs or symptoms of bleeding or clotting: No    Previous INR: Supratherapeutic    Additional findings: None         PLAN     Recommended plan for no diet, medication or health factor changes affecting INR     Dosing Instructions: Continue your current warfarin dose with next INR in 1 week       Summary  As of 4/7/2023    Full warfarin instructions:  2.5 mg every Tue, Fri; 3.75 mg all other days   Next INR check:  4/14/2023             Telephone call with Haresh who verbalizes understanding and agrees to plan    Patient to recheck with home meter    Education provided:     Please call back if any changes to your diet, medications or how you've been taking warfarin    Resume manage by exception with home monitor. Continue to submit INR results to home monitor company.You will only be called when your result is out of range. Please call and notify [facility] if new medication started, dose missed, signs or symptoms of bleeding or clotting, or a surgery/procedure is scheduled.    Contact [phone]  with any changes, questions or concerns.     Plan made per [facility] anticoagulation protocol    Shanice Hdye RN  Anticoagulation Clinic  4/7/2023    _______________________________________________________________________     Anticoagulation Episode Summary     Current INR goal:  2.0-3.0   TTR:  87.0 % (1 y)   Target end date:  Indefinite   Send INR reminders to:  [facility] HOME MONITORING    Indications    Long-term (current) use of anticoagulants [Z79.01] [Z79.01]  Atrial fibrillation (H)  [I48.91]  Antiphospholipid antibody syndrome (H) (Resolved) [D68.61]  Personal history of DVT (deep vein thrombosis) (Resolved) [Z86.718]  Atrial fibrillation  unspecified type (H) (Resolved) [I48.91]  Paroxysmal atrial fibrillation (H) [I48.0]  Chronic atrial fibrillation (H) [I48.20]  Personal history of PE (pulmonary embolism) [Z86.711]           Comments:  JESSICA rodas to manage by exception         Anticoagulation Care Providers     Provider Role Specialty Phone number    Anya Nowak MD Referring Family Medicine [phone]

## 2023-04-14 ENCOUNTER — DOCUMENTATION ONLY (OUTPATIENT)
Dept: ANTICOAGULATION | Facility: CLINIC | Age: 73
End: 2023-04-14
Payer: COMMERCIAL

## 2023-04-14 ENCOUNTER — TRANSFERRED RECORDS (OUTPATIENT)
Dept: HEALTH INFORMATION MANAGEMENT | Facility: CLINIC | Age: 73
End: 2023-04-14
Payer: COMMERCIAL

## 2023-04-14 DIAGNOSIS — Z79.01 LONG TERM CURRENT USE OF ANTICOAGULANT THERAPY: Primary | ICD-10-CM

## 2023-04-14 DIAGNOSIS — Z86.711 PERSONAL HISTORY OF PE (PULMONARY EMBOLISM): ICD-10-CM

## 2023-04-14 DIAGNOSIS — I48.0 PAROXYSMAL ATRIAL FIBRILLATION (H): ICD-10-CM

## 2023-04-14 DIAGNOSIS — I48.20 CHRONIC ATRIAL FIBRILLATION (H): ICD-10-CM

## 2023-04-14 LAB — INR (EXTERNAL): 3 (ref 0.9–1.1)

## 2023-04-17 ENCOUNTER — PATIENT OUTREACH (OUTPATIENT)
Dept: CARE COORDINATION | Facility: CLINIC | Age: 73
End: 2023-04-17
Payer: COMMERCIAL

## 2023-04-17 NOTE — PROGRESS NOTES
Clinic Care Coordination Contact  Community Health Worker Follow Up    Spoke to Patient (Haresh)    CHW Introduced intent of call regarding monthly follow up.     Patients reports that he is able to speak with CHW, further expressing that he is having difficulty with pronunciation of words due to something being in Patient's Mouth. Further indicating that he is able to talk but not for long. CHW acknowledged.     Patient expressed that he is still able to walk without walker further indicating that Patient's balance has been approving. Patient expressed no outstanding concerns or questions. CHW acknowledged.     CHW confirms Patient's upcoming appointment; 06/06/2023 8:30AM [facility] & 8:45AM  [facility] [place]. Patient acknowledged.       CHW encourages Patient to contact CHW/ CCC Team if additional support is needed. CHW provides Patient with CHW's contact information. Patient acknowledged.    Care Gaps:   Health Maintenance Due   Topic Date Due     HF ACTION PLAN  Never done     ZOSTER IMMUNIZATION (1 of 2) 11/14/2016     EYE EXAM  01/01/2022     TSH W/FREE T4 REFLEX  08/20/2022     DTAP/TDAP/TD IMMUNIZATION (2 - Td or Tdap) 09/11/2022     CBC  01/04/2023     Declined due to Patient expressed wanting to contact clinic scheduler on his own to schedule care gap appointment (ZOSTER IMMUNIZATION). Patient declined CHW support with clinic scheduler contact information.      Care Plan:   Care Plan: General  Work on increasing strength and stamina for a year after reaching 100%     Problem: HP GENERAL PROBLEM     Goal: General  work on walking without the walker, and increasing my strength and stamina.  For at least 1 year after reaching 100%.     Start Date: 12/13/2021 Expected End Date: 6/13/2024    This Visit's Progress: 70% Recent Progress: 60%    Note:     Barriers: suffers from parkinson's  Strengths: engaged in care coordination  Patient  expressed understanding of goal: yes  Action steps to achieve this goal:  1. I will continue walking without the walker. No longer using the walker as of 3/15/22. Completed action step  2. I will go outside without the walker when I feel strong enough and have increased my strength and stamina.  Continue to work on for at least a year.  3. I will frequently walk around inside the house to increase my strength and stamina.  Continue to work on for at least a year.                    Care Plan: General - Working on balance and items in the path,     Problem: HP GENERAL PROBLEM     Goal: General Goal - work on balance and avoiding items in the path     Start Date: 10/28/2022 Expected End Date: 10/28/2023    This Visit's Progress: 50% Recent Progress: 40%    Note:     Barriers: parkinson's  Strengths: engaged in care coordination  Patient expressed understanding of goal: yes   Action steps to achieve this goal:  1. I will work on walking around items in my path without losing my balance.  2. I will work on maintaining my balance when I walk through the house.  3. I will use my walker or cane as needed to maintain my balance.                          Intervention and Education during outreach: CHW encouraged Patient to contact CHW/ CCC Team If support is needed in scheduling care gap appointments (ZOSTER IMMUNIZATION) and if additional support or resources is needed.    CHW Plan: Next CHW Outreach in One Month    Abi Arias CHW  Clinic Care Coordination   [facility]   Phone: [phone]  [email or web address]

## 2023-04-21 ENCOUNTER — DOCUMENTATION ONLY (OUTPATIENT)
Dept: ANTICOAGULATION | Facility: CLINIC | Age: 73
End: 2023-04-21
Payer: COMMERCIAL

## 2023-04-21 ENCOUNTER — TRANSFERRED RECORDS (OUTPATIENT)
Dept: HEALTH INFORMATION MANAGEMENT | Facility: CLINIC | Age: 73
End: 2023-04-21
Payer: COMMERCIAL

## 2023-04-21 DIAGNOSIS — I48.0 PAROXYSMAL ATRIAL FIBRILLATION (H): ICD-10-CM

## 2023-04-21 DIAGNOSIS — Z79.01 LONG TERM CURRENT USE OF ANTICOAGULANT THERAPY: Primary | ICD-10-CM

## 2023-04-21 DIAGNOSIS — Z86.711 PERSONAL HISTORY OF PE (PULMONARY EMBOLISM): ICD-10-CM

## 2023-04-21 DIAGNOSIS — I48.20 CHRONIC ATRIAL FIBRILLATION (H): ICD-10-CM

## 2023-04-21 LAB — INR (EXTERNAL): 2.7 (ref 0.9–1.1)

## 2023-04-21 NOTE — PROGRESS NOTES
ANTICOAGULATION  MANAGEMENT-Home Monitor Managed by Exception    Hareshroger Rock 72 year old male is on warfarin with therapeutic INR result. (Goal INR 2.0-3.0)    Recent labs: (last 7 days)     04/21/23  0902   INR 2.7*         Previous INR was Therapeutic    Medication, diet, health changes since last INR:chart reviewed; none identified    Contacted within the last 12 weeks by phone on 04/07/2023           MINNIE Hutson was NOT contacted regarding therapeutic result today per home monitoring policy manage by exception agreement.   Current warfarin dose is to be continued:     Summary  As of 4/21/2023    Full warfarin instructions:  2.5 mg every Tue, Fri; 3.75 mg all other days   Next INR check:  4/28/2023           ?   Blanca Rodriguez RN  Anticoagulation Clinic  4/21/2023    _______________________________________________________________________     Anticoagulation Episode Summary     Current INR goal:  2.0-3.0   TTR:  86.9 % (1 y)   Target end date:  Indefinite   Send INR reminders to:  [facility]    Indications    Long-term (current) use of anticoagulants [Z79.01] [Z79.01]  Atrial fibrillation (H) [I48.91]  Antiphospholipid antibody syndrome (H) (Resolved) [D68.61]  Personal history of DVT (deep vein thrombosis) (Resolved) [Z86.718]  Atrial fibrillation  unspecified type (H) (Resolved) [I48.91]  Paroxysmal atrial fibrillation (H) [I48.0]  Chronic atrial fibrillation (H) [I48.20]  Personal history of PE (pulmonary embolism) [Z86.711]           Comments:  JESSICA rodas to manage by exception         Anticoagulation Care Providers     Provider Role Specialty Phone number    Anya Nowak MD Referring Family Medicine [phone]

## 2023-04-28 ENCOUNTER — DOCUMENTATION ONLY (OUTPATIENT)
Dept: ANTICOAGULATION | Facility: CLINIC | Age: 73
End: 2023-04-28
Payer: COMMERCIAL

## 2023-04-28 ENCOUNTER — TRANSFERRED RECORDS (OUTPATIENT)
Dept: HEALTH INFORMATION MANAGEMENT | Facility: CLINIC | Age: 73
End: 2023-04-28
Payer: COMMERCIAL

## 2023-04-28 DIAGNOSIS — Z79.01 LONG TERM CURRENT USE OF ANTICOAGULANT THERAPY: Primary | ICD-10-CM

## 2023-04-28 DIAGNOSIS — I48.20 CHRONIC ATRIAL FIBRILLATION (H): ICD-10-CM

## 2023-04-28 DIAGNOSIS — Z86.711 PERSONAL HISTORY OF PE (PULMONARY EMBOLISM): ICD-10-CM

## 2023-04-28 DIAGNOSIS — I48.0 PAROXYSMAL ATRIAL FIBRILLATION (H): ICD-10-CM

## 2023-04-28 LAB — INR (EXTERNAL): 2.7 (ref 0.9–1.1)

## 2023-04-28 NOTE — PROGRESS NOTES
ANTICOAGULATION  MANAGEMENT-Home Monitor Managed by Exception    Haresh Rock 72 year old male is on warfarin with therapeutic INR result. (Goal INR 2.0-3.0)    Recent labs: (last 7 days)     04/28/23  0952   INR 2.7*         Previous INR was Therapeutic    Medication, diet, health changes since last INR:chart reviewed; none identified    Contacted within the last 12 weeks by phone on 4/7/23      MINNIE Hutson was NOT contacted regarding therapeutic result today per home monitoring policy manage by exception agreement.   Current warfarin dose is to be continued:     Summary  As of 4/28/2023    Full warfarin instructions:  2.5 mg every Tue, Fri; 3.75 mg all other days   Next INR check:  5/5/2023           ?   Allyn Doe RN  Anticoagulation Clinic  4/28/2023    _______________________________________________________________________     Anticoagulation Episode Summary     Current INR goal:  2.0-3.0   TTR:  86.9 % (1 y)   Target end date:  Indefinite   Send INR reminders to:  [facility]    Indications    Long-term (current) use of anticoagulants [Z79.01] [Z79.01]  Atrial fibrillation (H) [I48.91]  Antiphospholipid antibody syndrome (H) (Resolved) [D68.61]  Personal history of DVT (deep vein thrombosis) (Resolved) [Z86.718]  Atrial fibrillation  unspecified type (H) (Resolved) [I48.91]  Paroxysmal atrial fibrillation (H) [I48.0]  Chronic atrial fibrillation (H) [I48.20]  Personal history of PE (pulmonary embolism) [Z86.711]           Comments:  JESSICA rodas to manage by exception         Anticoagulation Care Providers     Provider Role Specialty Phone number    Anya Nowak MD Referring Family Medicine [phone]

## 2023-05-05 ENCOUNTER — TRANSFERRED RECORDS (OUTPATIENT)
Dept: HEALTH INFORMATION MANAGEMENT | Facility: CLINIC | Age: 73
End: 2023-05-05
Payer: COMMERCIAL

## 2023-05-05 ENCOUNTER — DOCUMENTATION ONLY (OUTPATIENT)
Dept: ANTICOAGULATION | Facility: CLINIC | Age: 73
End: 2023-05-05
Payer: COMMERCIAL

## 2023-05-05 DIAGNOSIS — Z79.01 LONG TERM CURRENT USE OF ANTICOAGULANT THERAPY: Primary | ICD-10-CM

## 2023-05-05 DIAGNOSIS — Z86.711 PERSONAL HISTORY OF PE (PULMONARY EMBOLISM): ICD-10-CM

## 2023-05-05 DIAGNOSIS — I48.0 PAROXYSMAL ATRIAL FIBRILLATION (H): ICD-10-CM

## 2023-05-05 DIAGNOSIS — I48.20 CHRONIC ATRIAL FIBRILLATION (H): ICD-10-CM

## 2023-05-05 LAB — INR (EXTERNAL): 2.4 (ref 0.9–1.1)

## 2023-05-05 NOTE — PROGRESS NOTES
ANTICOAGULATION  MANAGEMENT-Home Monitor Managed by Exception    Haresh EMILIE Rock 72 year old male is on warfarin with therapeutic INR result. (Goal INR 2.0-3.0)    Recent labs: (last 7 days)     05/05/23  1332   INR 2.4*         Previous INR was Therapeutic    Medication, diet, health changes since last INR:chart reviewed; none identified    Contacted within the last 12 weeks by phone on 4/7/23      MINNIE Hutson was NOT contacted regarding therapeutic result today per home monitoring policy manage by exception agreement.   Current warfarin dose is to be continued:     Summary  As of 5/5/2023    Full warfarin instructions:  2.5 mg every Tue, Fri; 3.75 mg all other days   Next INR check:  5/12/2023           ?   Shanice Hyde RN  Anticoagulation Clinic  5/5/2023    _______________________________________________________________________     Anticoagulation Episode Summary     Current INR goal:  2.0-3.0   TTR:  86.8 % (1 y)   Target end date:  Indefinite   Send INR reminders to:  [facility]    Indications    Long-term (current) use of anticoagulants [Z79.01] [Z79.01]  Atrial fibrillation (H) [I48.91]  Antiphospholipid antibody syndrome (H) (Resolved) [D68.61]  Personal history of DVT (deep vein thrombosis) (Resolved) [Z86.718]  Atrial fibrillation  unspecified type (H) (Resolved) [I48.91]  Paroxysmal atrial fibrillation (H) [I48.0]  Chronic atrial fibrillation (H) [I48.20]  Personal history of PE (pulmonary embolism) [Z86.711]           Comments:  JESSICA rodas to manage by exception         Anticoagulation Care Providers     Provider Role Specialty Phone number    Anya Nowak MD Referring Family Medicine [phone]

## 2023-05-08 ENCOUNTER — PATIENT OUTREACH (OUTPATIENT)
Dept: CARE COORDINATION | Facility: CLINIC | Age: 73
End: 2023-05-08
Payer: COMMERCIAL

## 2023-05-08 NOTE — PROGRESS NOTES
Clinic Care Coordination Contact  Care Coordination Clinician Chart Review    Situation: Patient chart reviewed by Care Coordinator.       Background: Care Coordination Program started: 11/5/2021. Initial assessment completed and patient-centered care plan(s) were developed with participation from patient. Lead CC handed patient off to CHW for continued outreaches.       Assessment: Per chart review, patient outreach completed by CC CHW on 4/17/23.  Patient is actively working to accomplish goal(s). Patient's goal(s) appropriate and relevant at this time. Patient is not due for updated Plan of Care.  Assessments will be completed annually or as needed/with change of patient status.      Care Plan: General  Work on increasing strength and stamina for a year after reaching 100%     Problem: HP GENERAL PROBLEM     Goal: General  work on walking without the walker, and increasing my strength and stamina.  For at least 1 year after reaching 100%.     Start Date: 12/13/2021 Expected End Date: 6/13/2024    This Visit's Progress: 70% Recent Progress: 60%    Note:     Barriers: suffers from parkinson's  Strengths: engaged in care coordination  Patient expressed understanding of goal: yes  Action steps to achieve this goal:  1. I will continue walking without the walker. No longer using the walker as of 3/15/22. Completed action step  2. I will go outside without the walker when I feel strong enough and have increased my strength and stamina.  Continue to work on for at least a year.  3. I will frequently walk around inside the house to increase my strength and stamina.  Continue to work on for at least a year.                    Care Plan: General - Working on balance and items in the path,     Problem: HP GENERAL PROBLEM     Goal: General Goal - work on balance and avoiding items in the path     Start Date: 10/28/2022 Expected End Date: 10/28/2023    This Visit's Progress: 50% Recent Progress: 40%    Note:     Barriers:  parkinson's  Strengths: engaged in care coordination  Patient expressed understanding of goal: yes   Action steps to achieve this goal:  1. I will work on walking around items in my path without losing my balance.  2. I will work on maintaining my balance when I walk through the house.  3. I will use my walker or cane as needed to maintain my balance.                             Plan/Recommendations: The patient will continue working with Care Coordination to achieve goal(s) as above. CHW will continue outreaches at minimum every 30 days and will involve Lead CC as needed or if patient is ready to move to Maintenance. Lead CC will continue to monitor CHW outreaches and patient's progress to goal(s) every 6 weeks.     Plan of Care updated and sent to patient: Cary Zhou MSN, RN, PHN, CCM   Primary Care Clinical RN Care Coordinator  [facility]  5/8/2023   3:54 PM  [email or web address]  Office: [phone]

## 2023-05-09 ENCOUNTER — IMMUNIZATION (OUTPATIENT)
Dept: FAMILY MEDICINE | Facility: CLINIC | Age: 73
End: 2023-05-09
Payer: COMMERCIAL

## 2023-05-09 DIAGNOSIS — Z23 HIGH PRIORITY FOR 2019-NCOV VACCINE: Primary | ICD-10-CM

## 2023-05-09 PROCEDURE — 99207 PR NO CHARGE LOS: CPT

## 2023-05-09 PROCEDURE — 91313 COVID-19 BIVALENT 18+ (MODERNA): CPT

## 2023-05-09 PROCEDURE — 0134A COVID-19 BIVALENT 18+ (MODERNA): CPT

## 2023-05-12 ENCOUNTER — DOCUMENTATION ONLY (OUTPATIENT)
Dept: ANTICOAGULATION | Facility: CLINIC | Age: 73
End: 2023-05-12
Payer: COMMERCIAL

## 2023-05-12 ENCOUNTER — TRANSFERRED RECORDS (OUTPATIENT)
Dept: HEALTH INFORMATION MANAGEMENT | Facility: CLINIC | Age: 73
End: 2023-05-12
Payer: COMMERCIAL

## 2023-05-12 DIAGNOSIS — I48.20 CHRONIC ATRIAL FIBRILLATION (H): ICD-10-CM

## 2023-05-12 DIAGNOSIS — Z79.01 LONG TERM CURRENT USE OF ANTICOAGULANT THERAPY: Primary | ICD-10-CM

## 2023-05-12 DIAGNOSIS — I48.0 PAROXYSMAL ATRIAL FIBRILLATION (H): ICD-10-CM

## 2023-05-12 DIAGNOSIS — Z86.711 PERSONAL HISTORY OF PE (PULMONARY EMBOLISM): ICD-10-CM

## 2023-05-12 LAB — INR (EXTERNAL): 2.4 (ref 0.9–1.1)

## 2023-05-12 NOTE — PROGRESS NOTES
ANTICOAGULATION  MANAGEMENT-Home Monitor Managed by Exception    Hareshroger Rock 73 year old male is on warfarin with therapeutic INR result. (Goal INR 2.0-3.0)    Recent labs: (last 7 days)     05/12/23  1114   INR 2.4*         Previous INR was Therapeutic    Medication, diet, health changes since last INR:chart reviewed; none identified    Contacted within the last 12 weeks by phone on 4/7/23      MINNIE Hutson was NOT contacted regarding therapeutic result today per home monitoring policy manage by exception agreement.   Current warfarin dose is to be continued:     Summary  As of 5/12/2023    Full warfarin instructions:  2.5 mg every Tue, Fri; 3.75 mg all other days   Next INR check:  5/19/2023           ?   Allyn Doe RN  Anticoagulation Clinic  5/12/2023    _______________________________________________________________________     Anticoagulation Episode Summary     Current INR goal:  2.0-3.0   TTR:  86.8 % (1 y)   Target end date:  Indefinite   Send INR reminders to:  [facility]    Indications    Long-term (current) use of anticoagulants [Z79.01] [Z79.01]  Atrial fibrillation (H) [I48.91]  Antiphospholipid antibody syndrome (H) (Resolved) [D68.61]  Personal history of DVT (deep vein thrombosis) (Resolved) [Z86.718]  Atrial fibrillation  unspecified type (H) (Resolved) [I48.91]  Paroxysmal atrial fibrillation (H) [I48.0]  Chronic atrial fibrillation (H) [I48.20]  Personal history of PE (pulmonary embolism) [Z86.711]           Comments:  JESSICA rodas to manage by exception         Anticoagulation Care Providers     Provider Role Specialty Phone number    Anya Nowak MD Referring Family Medicine [phone]

## 2023-05-19 ENCOUNTER — DOCUMENTATION ONLY (OUTPATIENT)
Dept: ANTICOAGULATION | Facility: CLINIC | Age: 73
End: 2023-05-19
Payer: COMMERCIAL

## 2023-05-19 ENCOUNTER — TRANSFERRED RECORDS (OUTPATIENT)
Dept: HEALTH INFORMATION MANAGEMENT | Facility: CLINIC | Age: 73
End: 2023-05-19
Payer: COMMERCIAL

## 2023-05-19 DIAGNOSIS — I48.20 CHRONIC ATRIAL FIBRILLATION (H): ICD-10-CM

## 2023-05-19 DIAGNOSIS — I48.0 PAROXYSMAL ATRIAL FIBRILLATION (H): ICD-10-CM

## 2023-05-19 DIAGNOSIS — Z86.711 PERSONAL HISTORY OF PE (PULMONARY EMBOLISM): ICD-10-CM

## 2023-05-19 DIAGNOSIS — Z79.01 LONG TERM CURRENT USE OF ANTICOAGULANT THERAPY: Primary | ICD-10-CM

## 2023-05-19 LAB — INR (EXTERNAL): 2.6 (ref 2–3)

## 2023-05-19 NOTE — PROGRESS NOTES
ANTICOAGULATION  MANAGEMENT-Home Monitor Managed by Exception    Haresh EMILIE Rock 73 year old male is on warfarin with therapeutic INR result. (Goal INR 2.0-3.0)    Recent labs: (last 7 days)     05/19/23  1134   INR 2.6         Previous INR was Therapeutic    Medication, diet, health changes since last INR:chart reviewed; none identified    Contacted within the last 12 weeks by phone on 4/7/23      MINNIE Hutson was NOT contacted regarding therapeutic result today per home monitoring policy manage by exception agreement.   Current warfarin dose is to be continued:     Summary  As of 5/19/2023    Full warfarin instructions:  2.5 mg every Tue, Fri; 3.75 mg all other days   Next INR check:  5/26/2023           ?   Belinda Lafleur, RN  Anticoagulation Clinic  5/19/2023    _______________________________________________________________________     Anticoagulation Episode Summary     Current INR goal:  2.0-3.0   TTR:  86.8 % (1 y)   Target end date:  Indefinite   Send INR reminders to:  [facility]    Indications    Long-term (current) use of anticoagulants [Z79.01] [Z79.01]  Atrial fibrillation (H) [I48.91]  Antiphospholipid antibody syndrome (H) (Resolved) [D68.61]  Personal history of DVT (deep vein thrombosis) (Resolved) [Z86.718]  Atrial fibrillation  unspecified type (H) (Resolved) [I48.91]  Paroxysmal atrial fibrillation (H) [I48.0]  Chronic atrial fibrillation (H) [I48.20]  Personal history of PE (pulmonary embolism) [Z86.711]           Comments:  JESSICA rodas to manage by exception         Anticoagulation Care Providers     Provider Role Specialty Phone number    Anya Nowak MD Referring Family Medicine [phone]

## 2023-05-24 ENCOUNTER — PATIENT OUTREACH (OUTPATIENT)
Dept: CARE COORDINATION | Facility: CLINIC | Age: 73
End: 2023-05-24
Payer: COMMERCIAL

## 2023-05-24 DIAGNOSIS — D68.61 ANTIPHOSPHOLIPID ANTIBODY SYNDROME (H): Primary | ICD-10-CM

## 2023-05-24 DIAGNOSIS — I48.0 PAROXYSMAL ATRIAL FIBRILLATION (H): ICD-10-CM

## 2023-05-24 NOTE — PROGRESS NOTES
Clinic Care Coordination Contact    Community Health Worker Follow Up    Care Gaps:     Health Maintenance Due   Topic Date Due     HF ACTION PLAN  Never done     ZOSTER IMMUNIZATION (1 of 2) 11/14/2016     EYE EXAM  01/01/2022     TSH W/FREE T4 REFLEX  08/20/2022     DTAP/TDAP/TD IMMUNIZATION (2 - Td or Tdap) 09/11/2022     CBC  01/04/2023       Patient completed eye exam on 2/13/23 will discuss other care gaps at next PCP visit.     Care Plan:   Care Plan: General  Work on increasing strength and stamina for a year after reaching 100%     Problem: HP GENERAL PROBLEM     Goal: General  work on walking without the walker, and increasing my strength and stamina.  For at least 1 year after reaching 100%.     Start Date: 12/13/2021 Expected End Date: 6/13/2024    This Visit's Progress: 80% Recent Progress: 70%    Note:     Barriers: suffers from parkinson's  Strengths: engaged in care coordination  Patient expressed understanding of goal: yes  Action steps to achieve this goal:  1. I will continue walking without the walker. No longer using the walker as of 3/15/22. Completed action step  2. I will go outside without the walker when I feel strong enough and have increased my strength and stamina.  Continue to work on for at least a year.  3. I will frequently walk around inside the house to increase my strength and stamina.  Continue to work on for at least a year.                    Care Plan: General - Working on balance and items in the path,     Problem: HP GENERAL PROBLEM     Goal: General Goal - work on balance and avoiding items in the path     Start Date: 10/28/2022 Expected End Date: 10/28/2023    This Visit's Progress: 60% Recent Progress: 50%    Note:     Barriers: parkinson's  Strengths: engaged in care coordination  Patient expressed understanding of goal: yes   Action steps to achieve this goal:  1. I will work on walking around items in my path without losing my balance.  2. I will work on maintaining my  balance when I walk through the house.  3. I will use my walker or cane as needed to maintain my balance.                          Intervention and Education during outreach: Patient states he is doing well and is getting outside to do yard work. This has been keeping him active. He hasn't had to use his cane or walker.    CHW Plan: CHW will continue to support patient with goals through routine scheduled outreach.     Next outreach due: 6/22/23

## 2023-05-26 ENCOUNTER — TRANSFERRED RECORDS (OUTPATIENT)
Dept: HEALTH INFORMATION MANAGEMENT | Facility: CLINIC | Age: 73
End: 2023-05-26
Payer: COMMERCIAL

## 2023-05-26 ENCOUNTER — DOCUMENTATION ONLY (OUTPATIENT)
Dept: ANTICOAGULATION | Facility: CLINIC | Age: 73
End: 2023-05-26
Payer: COMMERCIAL

## 2023-05-26 DIAGNOSIS — Z86.711 PERSONAL HISTORY OF PE (PULMONARY EMBOLISM): ICD-10-CM

## 2023-05-26 DIAGNOSIS — Z79.01 LONG TERM CURRENT USE OF ANTICOAGULANT THERAPY: Primary | ICD-10-CM

## 2023-05-26 DIAGNOSIS — I48.0 PAROXYSMAL ATRIAL FIBRILLATION (H): ICD-10-CM

## 2023-05-26 DIAGNOSIS — I48.20 CHRONIC ATRIAL FIBRILLATION (H): ICD-10-CM

## 2023-05-26 LAB — INR (EXTERNAL): 2.3 (ref 0.9–1.1)

## 2023-05-26 NOTE — PROGRESS NOTES
ANTICOAGULATION  MANAGEMENT-Home Monitor Managed by Exception    Haresh EMILIE Rock 73 year old male is on warfarin with therapeutic INR result. (Goal INR 2.0-3.0)    Recent labs: (last 7 days)     05/26/23  1000   INR 2.3*         Previous INR was Therapeutic    Medication, diet, health changes since last INR:chart reviewed; none identified    Contacted within the last 12 weeks by phone on 4/7/23      MINNIE Hutson was NOT contacted regarding therapeutic result today per home monitoring policy manage by exception agreement.   Current warfarin dose is to be continued:     Summary  As of 5/26/2023    Full warfarin instructions:  2.5 mg every Tue, Fri; 3.75 mg all other days   Next INR check:  6/2/2023           ?   Allyn Doe RN  Anticoagulation Clinic  5/26/2023    _______________________________________________________________________     Anticoagulation Episode Summary     Current INR goal:  2.0-3.0   TTR:  86.8 % (1 y)   Target end date:  Indefinite   Send INR reminders to:  [facility]    Indications    Long-term (current) use of anticoagulants [Z79.01] [Z79.01]  Atrial fibrillation (H) [I48.91]  Antiphospholipid antibody syndrome (H) (Resolved) [D68.61]  Personal history of DVT (deep vein thrombosis) (Resolved) [Z86.718]  Atrial fibrillation  unspecified type (H) (Resolved) [I48.91]  Paroxysmal atrial fibrillation (H) [I48.0]  Chronic atrial fibrillation (H) [I48.20]  Personal history of PE (pulmonary embolism) [Z86.711]           Comments:  JESSICA rodas to manage by exception         Anticoagulation Care Providers     Provider Role Specialty Phone number    Anya Nowak MD Referring Family Medicine [phone]

## 2023-06-02 ENCOUNTER — TRANSFERRED RECORDS (OUTPATIENT)
Dept: HEALTH INFORMATION MANAGEMENT | Facility: CLINIC | Age: 73
End: 2023-06-02
Payer: COMMERCIAL

## 2023-06-02 ENCOUNTER — DOCUMENTATION ONLY (OUTPATIENT)
Dept: NURSING | Facility: CLINIC | Age: 73
End: 2023-06-02
Payer: COMMERCIAL

## 2023-06-02 DIAGNOSIS — Z79.01 LONG TERM CURRENT USE OF ANTICOAGULANT THERAPY: Primary | ICD-10-CM

## 2023-06-02 DIAGNOSIS — I48.0 PAROXYSMAL ATRIAL FIBRILLATION (H): ICD-10-CM

## 2023-06-02 DIAGNOSIS — E04.1 THYROID NODULE: ICD-10-CM

## 2023-06-02 DIAGNOSIS — E83.110 HEREDITARY HEMOCHROMATOSIS (H): ICD-10-CM

## 2023-06-02 DIAGNOSIS — Z86.711 PERSONAL HISTORY OF PE (PULMONARY EMBOLISM): ICD-10-CM

## 2023-06-02 DIAGNOSIS — G20.A1 PARKINSON'S DISEASE (H): Primary | ICD-10-CM

## 2023-06-02 DIAGNOSIS — Z79.01 LONG TERM CURRENT USE OF ANTICOAGULANT THERAPY: ICD-10-CM

## 2023-06-02 DIAGNOSIS — I48.20 CHRONIC ATRIAL FIBRILLATION (H): ICD-10-CM

## 2023-06-02 LAB — INR (EXTERNAL): 2.6 (ref 0.9–1.1)

## 2023-06-02 NOTE — PROGRESS NOTES
ANTICOAGULATION  MANAGEMENT-Home Monitor Managed by Exception    Haresh EMILIE Rock 73 year old male is on warfarin with therapeutic INR result. (Goal INR 2.0-3.0)    Recent labs: (last 7 days)     06/02/23  0854   INR 2.6*         Previous INR was Therapeutic    Medication, diet, health changes since last INR:chart reviewed; none identified    Contacted within the last 12 weeks by phone on 4/7/23      MINNIE Hutson was NOT contacted regarding therapeutic result today per home monitoring policy manage by exception agreement.   Current warfarin dose is to be continued:     Summary  As of 6/2/2023    Full warfarin instructions:  2.5 mg every Tue, Fri; 3.75 mg all other days   Next INR check:  6/9/2023           ?   Blanca Rodriguez RN  Anticoagulation Clinic  6/2/2023    _______________________________________________________________________     Anticoagulation Episode Summary     Current INR goal:  2.0-3.0   TTR:  86.9 % (1 y)   Target end date:  Indefinite   Send INR reminders to:  [facility]    Indications    Long-term (current) use of anticoagulants [Z79.01] [Z79.01]  Atrial fibrillation (H) [I48.91]  Antiphospholipid antibody syndrome (H) (Resolved) [D68.61]  Personal history of DVT (deep vein thrombosis) (Resolved) [Z86.718]  Atrial fibrillation  unspecified type (H) (Resolved) [I48.91]  Paroxysmal atrial fibrillation (H) [I48.0]  Chronic atrial fibrillation (H) [I48.20]  Personal history of PE (pulmonary embolism) [Z86.711]           Comments:  JESSICA rodas to manage by exception         Anticoagulation Care Providers     Provider Role Specialty Phone number    Anya Nowak MD Referring Family Medicine [phone]

## 2023-06-06 ENCOUNTER — ANTICOAGULATION THERAPY VISIT (OUTPATIENT)
Dept: ANTICOAGULATION | Facility: CLINIC | Age: 73
End: 2023-06-06

## 2023-06-06 ENCOUNTER — APPOINTMENT (OUTPATIENT)
Dept: LAB | Facility: CLINIC | Age: 73
End: 2023-06-06
Attending: INTERNAL MEDICINE
Payer: COMMERCIAL

## 2023-06-06 ENCOUNTER — ONCOLOGY VISIT (OUTPATIENT)
Dept: TRANSPLANT | Facility: CLINIC | Age: 73
End: 2023-06-06
Attending: INTERNAL MEDICINE
Payer: COMMERCIAL

## 2023-06-06 VITALS
SYSTOLIC BLOOD PRESSURE: 116 MMHG | TEMPERATURE: 98 F | OXYGEN SATURATION: 98 % | WEIGHT: 173.8 LBS | RESPIRATION RATE: 16 BRPM | DIASTOLIC BLOOD PRESSURE: 64 MMHG | BODY MASS INDEX: 24.24 KG/M2 | HEART RATE: 64 BPM

## 2023-06-06 DIAGNOSIS — I48.20 CHRONIC ATRIAL FIBRILLATION (H): ICD-10-CM

## 2023-06-06 DIAGNOSIS — I48.0 PAROXYSMAL ATRIAL FIBRILLATION (H): ICD-10-CM

## 2023-06-06 DIAGNOSIS — Z86.711 PERSONAL HISTORY OF PE (PULMONARY EMBOLISM): ICD-10-CM

## 2023-06-06 DIAGNOSIS — E83.110 HEREDITARY HEMOCHROMATOSIS (H): ICD-10-CM

## 2023-06-06 DIAGNOSIS — Z79.01 LONG TERM CURRENT USE OF ANTICOAGULANT THERAPY: ICD-10-CM

## 2023-06-06 DIAGNOSIS — G20.A1 PARKINSON'S DISEASE (H): ICD-10-CM

## 2023-06-06 DIAGNOSIS — Z79.01 LONG TERM CURRENT USE OF ANTICOAGULANT THERAPY: Primary | ICD-10-CM

## 2023-06-06 DIAGNOSIS — E04.1 THYROID NODULE: ICD-10-CM

## 2023-06-06 LAB
ALBUMIN SERPL BCG-MCNC: 3.8 G/DL (ref 3.5–5.2)
ALP SERPL-CCNC: 112 U/L (ref 40–129)
ALT SERPL W P-5'-P-CCNC: 7 U/L (ref 10–50)
ANION GAP SERPL CALCULATED.3IONS-SCNC: 10 MMOL/L (ref 7–15)
AST SERPL W P-5'-P-CCNC: 34 U/L (ref 10–50)
BASOPHILS # BLD AUTO: 0 10E3/UL (ref 0–0.2)
BASOPHILS NFR BLD AUTO: 0 %
BILIRUB SERPL-MCNC: 0.8 MG/DL
BUN SERPL-MCNC: 31.1 MG/DL (ref 8–23)
CALCIUM SERPL-MCNC: 9.1 MG/DL (ref 8.8–10.2)
CHLORIDE SERPL-SCNC: 100 MMOL/L (ref 98–107)
CREAT SERPL-MCNC: 0.94 MG/DL (ref 0.67–1.17)
CRP SERPL-MCNC: 3.88 MG/L
DEPRECATED HCO3 PLAS-SCNC: 24 MMOL/L (ref 22–29)
EOSINOPHIL # BLD AUTO: 0.1 10E3/UL (ref 0–0.7)
EOSINOPHIL NFR BLD AUTO: 1 %
ERYTHROCYTE [DISTWIDTH] IN BLOOD BY AUTOMATED COUNT: 12.2 % (ref 10–15)
FERRITIN SERPL-MCNC: 111 NG/ML (ref 31–409)
GFR SERPL CREATININE-BSD FRML MDRD: 85 ML/MIN/1.73M2
GLUCOSE SERPL-MCNC: 133 MG/DL (ref 70–99)
HCT VFR BLD AUTO: 44.9 % (ref 40–53)
HGB BLD-MCNC: 16.2 G/DL (ref 13.3–17.7)
IMM GRANULOCYTES # BLD: 0 10E3/UL
IMM GRANULOCYTES NFR BLD: 0 %
INR PPP: 2.82 (ref 0.85–1.15)
IRON BINDING CAPACITY (ROCHE): 234 UG/DL (ref 240–430)
IRON SATN MFR SERPL: 60 % (ref 15–46)
IRON SERPL-MCNC: 141 UG/DL (ref 61–157)
LYMPHOCYTES # BLD AUTO: 2.6 10E3/UL (ref 0.8–5.3)
LYMPHOCYTES NFR BLD AUTO: 32 %
MCH RBC QN AUTO: 34.5 PG (ref 26.5–33)
MCHC RBC AUTO-ENTMCNC: 36.1 G/DL (ref 31.5–36.5)
MCV RBC AUTO: 96 FL (ref 78–100)
MONOCYTES # BLD AUTO: 0.7 10E3/UL (ref 0–1.3)
MONOCYTES NFR BLD AUTO: 8 %
NEUTROPHILS # BLD AUTO: 4.7 10E3/UL (ref 1.6–8.3)
NEUTROPHILS NFR BLD AUTO: 59 %
NRBC # BLD AUTO: 0 10E3/UL
NRBC BLD AUTO-RTO: 0 /100
PLATELET # BLD AUTO: 85 10E3/UL (ref 150–450)
POTASSIUM SERPL-SCNC: 5 MMOL/L (ref 3.4–5.3)
PROT SERPL-MCNC: 7.4 G/DL (ref 6.4–8.3)
RBC # BLD AUTO: 4.69 10E6/UL (ref 4.4–5.9)
SODIUM SERPL-SCNC: 134 MMOL/L (ref 136–145)
TSH SERPL DL<=0.005 MIU/L-ACNC: 3.78 UIU/ML (ref 0.3–4.2)
WBC # BLD AUTO: 8.1 10E3/UL (ref 4–11)

## 2023-06-06 PROCEDURE — 85610 PROTHROMBIN TIME: CPT | Performed by: INTERNAL MEDICINE

## 2023-06-06 PROCEDURE — 85025 COMPLETE CBC W/AUTO DIFF WBC: CPT | Performed by: INTERNAL MEDICINE

## 2023-06-06 PROCEDURE — G0463 HOSPITAL OUTPT CLINIC VISIT: HCPCS | Performed by: INTERNAL MEDICINE

## 2023-06-06 PROCEDURE — 36415 COLL VENOUS BLD VENIPUNCTURE: CPT | Performed by: INTERNAL MEDICINE

## 2023-06-06 PROCEDURE — 99214 OFFICE O/P EST MOD 30 MIN: CPT | Performed by: INTERNAL MEDICINE

## 2023-06-06 PROCEDURE — 82728 ASSAY OF FERRITIN: CPT | Performed by: INTERNAL MEDICINE

## 2023-06-06 PROCEDURE — 86140 C-REACTIVE PROTEIN: CPT | Performed by: INTERNAL MEDICINE

## 2023-06-06 PROCEDURE — 83550 IRON BINDING TEST: CPT | Performed by: INTERNAL MEDICINE

## 2023-06-06 PROCEDURE — 84443 ASSAY THYROID STIM HORMONE: CPT | Performed by: INTERNAL MEDICINE

## 2023-06-06 PROCEDURE — 80053 COMPREHEN METABOLIC PANEL: CPT | Performed by: INTERNAL MEDICINE

## 2023-06-06 ASSESSMENT — PAIN SCALES - GENERAL: PAINLEVEL: NO PAIN (0)

## 2023-06-06 NOTE — NURSING NOTE
Chief Complaint   Patient presents with     Blood Draw     Labs drawn with  by rn.  VS taken.     Labs drawn with  by rn.  Pt tolerated well.  VS taken.  Pt checked in for next appt.    Alice Castillo RN

## 2023-06-06 NOTE — NURSING NOTE
"Oncology Rooming Note    June 6, 2023 9:09 AM   Haresh Rock is a 73 year old male who presents for:    Chief Complaint   Patient presents with     Blood Draw     Labs drawn with  by rn.  VS taken.     Oncology Clinic Visit     Hx of Hodgkin's lymphoma     Initial Vitals: /64 (BP Location: Right arm, Patient Position: Sitting, Cuff Size: Adult Regular)   Pulse 64   Temp 98  F (36.7  C) (Oral)   Resp 16   Wt 78.8 kg (173 lb 12.8 oz)   SpO2 98%   BMI 24.24 kg/m   Estimated body mass index is 24.24 kg/m  as calculated from the following:    Height as of 12/28/22: 1.803 m (5' 11\").    Weight as of this encounter: 78.8 kg (173 lb 12.8 oz). Body surface area is 1.99 meters squared.  No Pain (0) Comment: Data Unavailable   No LMP for male patient.  Allergies reviewed: Yes  Medications reviewed: Yes    Medications: Medication refills not needed today.  Pharmacy name entered into [employer] [place]:    [id number] - [place] - [address] [facility]  [facility] - [facility], [place] - [address] [place] [address] [facility] - [place] - [address] [facility]    Clinical concerns: none       Pily Hubbard CMA            "

## 2023-06-06 NOTE — PROGRESS NOTES
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 He was  diagnosed by Dr. Ángel Hill with Parkinson disease, with weight loss, dysphagia with aspiration, requiring G tube placement, orthostasis, urinary obstruction. Most recent CT and PET 2/26/21 were without evidence for recurrence of malignancy. Hypermetabolic activity at the anorectal junction with diffuse wall thickening was worked up by 3/17/21 flex sigmoidoscopy which showed a 4 mm polyp (removed, tubular adenoma), and erythematous mucosa that was biopsied and found superficial vascular congestion and melanosis coli without evidence for GVHD. Had full colonoscopy 4/14/21 without other findings. He had TURP with Dr. Hawley for urinary obstruction 8/9/21, as medical therapy was causing too much orthostasis.  Haresh was hospitalized in October and had a small bowel resection because of a G tube complication. G tube was replaced.     INTERVAL HISTORY  Haresh is feeling well and is here to follow up for concern over falling platelet numbers. He has dysphagia related to Parkinson's with dry foods but can eat small amount of non-dry foods (e.g. pudding, scrambled eggs) and has this past Easter and Jaky. He can still swallow his morning and midday pills but when he has to take many he crushes them and uses G tube instead. He continues to drink about 1 cup of soda each day to help with feelings of dryness and cough.       He continues to receive 2200 calories/day using his G tube. He has recently felt more full after meals,  and continues to not feel many hunger pains. He is scheduled to have a G tube replacement in Sep 2023.       He continues to experience polyuria and nocturia (3 or 4x/night). He has also had mild reflux at night. Haresh notices pain in his R hip when lying on it, and it is not bothersome when he is moving. He also has lower back pain when he awakes. His sleep has been good and he regularly takes 60-90 minute midday naps.       Haresh's Parkinson's disease appears to be mild. He has mild rest tremors and minimal cogwheeling in his upper extremity. These can interfere with his ability to write, but he was able to sign legibly on exam.  In terms of his lower platelets no new bruising or bleeding  He has not had a phlebotomy for a while for his iron overload  Last ferritin was 118 and iron sat 60%     PAST MEDICAL HISTORY:  See my note from 04/2023     SOCIAL HISTORY:  See my note from 04/2023     FAMILY HISTORY:  See my note from  04/2023     REVIEW OF SYSTEMS:  A 12-point review of systems is done and is negative, except as in the HPI.   General: No fever, chills, nausea, vomiting, or abnormal fatigue.   Cardiac: No chest pain, palpitations, or lower extremity edema.  Respiratory: Cough with dry mouth, shortness of breath upon exertion  Skin: No bruising, rash, or abnormal pigmentation.   GI: Dysphagia from Parkinson's. G tube for feeding. No constipation or bleeding.  : Polyuria and nocturia. No incontinence or bleeding.  MSK: Lower back pain after sleep. R hip pain when lying on it.    /64 (BP Location: Right arm, Patient Position: Sitting, Cuff Size: Adult Regular)   Pulse 64   Temp 98  F (36.7  C) (Oral)   Resp 16   Wt 78.8 kg (173 lb 12.8 oz)   SpO2 98%   BMI 24.24 kg/m    PHYSICAL EXAMINATION: BP, HR, temp and oxygen saturation WNL  General:  Alert and oriented.  EYES:  Grossly normal to inspection, no discharge, erythema or icterus.   SKIN:  Skin is clear without lesion. No significant rash or  abnormal pigmentation.   CARDIAC: Regular rate and rhythm.    RESP: Lung fields clear to auscultation bilaterally. No wheezes, crackles or rhonchi.    NEUROLOGIC:  Cranial nerves seem to be intact.  Mentation and speech is appropriate for age. Voice is low decibel and stable. Minimal resting tremor. Minimal cogwheeling in upper extremity.  PSYCHIATRIC:  Mentation appears normal. He does not seem anxious today.     DATA:   Latest Reference Range & Units 06/06/23 09:00   Iron 61 - 157 ug/dL 141   Iron Binding Capacity 240 - 430 ug/dL 234 (L)   Iron Sat Index 15 - 46 % 60 (H)   WBC 4.0 - 11.0 10e3/uL 8.1   Hemoglobin 13.3 - 17.7 g/dL 16.2   Hematocrit 40.0 - 53.0 % 44.9   Platelet Count 150 - 450 10e3/uL 85 (L)   RBC Count 4.40 - 5.90 10e6/uL 4.69   MCV 78 - 100 fL 96   MCH 26.5 - 33.0 pg 34.5 (H)   MCHC 31.5 - 36.5 g/dL 36.1   RDW 10.0 - 15.0 % 12.2   % Neutrophils % 59   % Lymphocytes % 32   % Monocytes % 8   % Eosinophils % 1   % Basophils % 0   Absolute Basophils 0.0 - 0.2 10e3/uL 0.0   Absolute Eosinophils 0.0 - 0.7 10e3/uL 0.1   Absolute Immature Granulocytes <=0.4 10e3/uL 0.0   Absolute Lymphocytes 0.8 - 5.3 10e3/uL 2.6   Absolute Monocytes 0.0 - 1.3 10e3/uL 0.7   % Immature Granulocytes % 0   Absolute Neutrophils 1.6 - 8.3 10e3/uL 4.7   Absolute NRBCs 10e3/uL 0.0   NRBCs per 100 WBC <1 /100 0   INR 0.85 - 1.15  2.82 (H)   (L): Data is abnormally low  (H): Data is abnormally high    ASSESSMENT:     1.  Myeloproliferative syndrome/MDS. STEVIE 2 positive  2.  Status post non-myeloablative allo-sib peripheral blood stem cell transplant 2007  3.  History of chronic hzzzv-bynawe-fuan disease.   4.  History of Hodgkin's disease 2011 s/p ABVD x 5 cycles.   5.  History of cardiac arrhythmias, SVT and V tach.   6.  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   7.  History of cirrhosis.   8.  History of pulmonary emboli.   9.  History of elevated hemoglobin A1c.   10. Thyroid nodule.     11. Cholelithiasis.     12. Diverticulosis.   13. Anticoagulation with warfarin for history of DVT, PE, and paroxysmal atrial fibrillation  14. Status post bilateral knee replacement.     15. Aortic stenosis  16. 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  17. Seborrheic keratosis of the back.  18. Weight loss, dysphagia, anorexia related to Parkinson's  19. Parkinson's  20. 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 Chronic aspiration related to dysphagia  22. Thrombocytopenia  23. Mild reflux     ASSESSMENT:    Haresh's platelet count has been stable since June 2022 (90 then, 85 today). He has maintained his weight since Apr 2023 (173lbs today). I wonder if he has portal hypertension and splenic sequestration due to previous cirrhosis.  Have not  done a doppler of portal vein for quite a Joselin should receive another COVID booster vaccine in the Fall pending future CDC recommendations.Will not do a phlebotomy at this time Continue warfarin, INR 2.82 today     I will see Haresh again in 4 months.     I spent a total of 30 minutes face to face with Haresh Rock during today's office visit. Over 50% of this time was spent counseling the patient and coordinating the care regarding Hodgkins and BMT and 10 minutes preparing to see the patient and  care coordination      Augusto Miller MD  [phone]

## 2023-06-06 NOTE — PROGRESS NOTES
ANTICOAGULATION MANAGEMENT     Haresh Rock 73 year old male is on warfarin with therapeutic INR result. (Goal INR 2.0-3.0)    Recent labs: (last 7 days)     06/06/23  0900   INR 2.82*       ASSESSMENT       Source(s): Chart Review and Patient/Caregiver Call       Warfarin doses taken: Warfarin taken as instructed    Diet: No new diet changes identified    Medication/supplement changes: None noted    New illness, injury, or hospitalization: No    Signs or symptoms of bleeding or clotting: No    Previous result: Therapeutic last 2(+) visits    Additional findings: Called pt today because INR was done in clinic.         PLAN     Recommended plan for no diet, medication or health factor changes affecting INR     Dosing Instructions: Continue your current warfarin dose with next INR in 3 days       Summary  As of 6/6/2023    Full warfarin instructions:  2.5 mg every Tue, Fri; 3.75 mg all other days   Next INR check:  6/9/2023             Telephone call with Haresh who verbalizes understanding and agrees to plan    Patient to recheck with home meter    Education provided:     Goal range and lab monitoring: goal range and significance of current result    Resume manage by exception with home monitor. Continue to submit INR results to home monitor company.You will only be called when your result is out of range. Please call and notify [facility] if new medication started, dose missed, signs or symptoms of bleeding or clotting, or a surgery/procedure is scheduled.    Plan made per [facility] anticoagulation protocol    Hari Saavedra RN  Anticoagulation Clinic  6/6/2023    _______________________________________________________________________     Anticoagulation Episode Summary     Current INR goal:  2.0-3.0   TTR:  86.9 % (1 y)   Target end date:  Indefinite   Send INR reminders to:  [facility]    Indications    Long-term (current) use of anticoagulants [Z79.01] [Z79.01]  Atrial fibrillation (H) [I48.91]  Antiphospholipid  antibody syndrome (H) (Resolved) [D68.61]  Personal history of DVT (deep vein thrombosis) (Resolved) [Z86.718]  Atrial fibrillation  unspecified type (H) (Resolved) [I48.91]  Paroxysmal atrial fibrillation (H) [I48.0]  Chronic atrial fibrillation (H) [I48.20]  Personal history of PE (pulmonary embolism) [Z86.711]           Comments:  JESSICA okay to manage by exception         Anticoagulation Care Providers     Provider Role Specialty Phone number    Anya Nowak MD Referring Family Medicine [phone]

## 2023-06-06 NOTE — LETTER
6/6/2023         RE: Haresh Rock  [address] [facility] [address]        Dear Colleague,    Thank you for referring your patient, Haresh Rock, to the [facility] [place]. Please see a copy of my visit note below.    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 He was  diagnosed by Dr. Ángel Hill with Parkinson disease, with weight loss, dysphagia with aspiration, requiring G tube placement, orthostasis, urinary obstruction. Most recent CT and PET 2/26/21 were without evidence for recurrence of malignancy. Hypermetabolic activity at the anorectal junction with diffuse wall thickening was worked up by 3/17/21 flex sigmoidoscopy which showed a 4 mm polyp (removed, tubular adenoma), and erythematous mucosa that was biopsied and found superficial vascular congestion and melanosis coli without evidence for GVHD. Had full colonoscopy 4/14/21 without other findings. He had TURP with Dr. Hawley for urinary obstruction 8/9/21, as medical therapy was causing too much orthostasis.  Haresh was hospitalized in October and had a small bowel resection because of a G tube complication. G tube was replaced.     INTERVAL HISTORY  Haresh is feeling well and is here to follow up for concern over falling platelet numbers. He has dysphagia related to Parkinson's with dry foods but can eat small amount of non-dry foods (e.g. pudding, scrambled eggs) and has this past Easter and Jaky. He can still swallow his morning and midday pills  but when he has to take many he crushes them and uses G tube instead. He continues to drink about 1 cup of soda each day to help with feelings of dryness and cough.       He continues to receive 2200 calories/day using his G tube. He has recently felt more full after meals, and continues to not feel many hunger pains. He is scheduled to have a G tube replacement in Sep 2023.       He continues to experience polyuria and nocturia (3 or 4x/night). He has also had mild reflux at night. Haresh notices pain in his R hip when lying on it, and it is not bothersome when he is moving. He also has lower back pain when he awakes. His sleep has been good and he regularly takes 60-90 minute midday naps.       Haresh's Parkinson's disease appears to be mild. He has mild rest tremors and minimal cogwheeling in his upper extremity. These can interfere with his ability to write, but he was able to sign legibly on exam.  In terms of his lower platelets no new bruising or bleeding  He has not had a phlebotomy for a while for his iron overload  Last ferritin was 118 and iron sat 60%     PAST MEDICAL HISTORY:  See my note from 04/2023     SOCIAL HISTORY:  See my note from 04/2023     FAMILY HISTORY:  See my note from  04/2023     REVIEW OF SYSTEMS:  A 12-point review of systems is done and is negative, except as in the HPI.   General: No fever, chills, nausea, vomiting, or abnormal fatigue.   Cardiac: No chest pain, palpitations, or lower extremity edema.  Respiratory: Cough with dry mouth, shortness of breath upon exertion  Skin: No bruising, rash, or abnormal pigmentation.   GI: Dysphagia from Parkinson's. G tube for feeding. No constipation or bleeding.  : Polyuria and nocturia. No incontinence or bleeding.  MSK: Lower back pain after sleep. R hip pain when lying on it.    /64 (BP Location: Right arm, Patient Position: Sitting, Cuff Size: Adult Regular)   Pulse 64   Temp 98  F (36.7  C) (Oral)   Resp 16   Wt 78.8 kg (173 lb  12.8 oz)   SpO2 98%   BMI 24.24 kg/m    PHYSICAL EXAMINATION: BP, HR, temp and oxygen saturation WNL  General:  Alert and oriented.  EYES:  Grossly normal to inspection, no discharge, erythema or icterus.   SKIN:  Skin is clear without lesion. No significant rash or abnormal pigmentation.   CARDIAC: Regular rate and rhythm.    RESP: Lung fields clear to auscultation bilaterally. No wheezes, crackles or rhonchi.    NEUROLOGIC:  Cranial nerves seem to be intact.  Mentation and speech is appropriate for age. Voice is low decibel and stable. Minimal resting tremor. Minimal cogwheeling in upper extremity.  PSYCHIATRIC:  Mentation appears normal. He does not seem anxious today.     DATA:   Latest Reference Range & Units 06/06/23 09:00   Iron 61 - 157 ug/dL 141   Iron Binding Capacity 240 - 430 ug/dL 234 (L)   Iron Sat Index 15 - 46 % 60 (H)   WBC 4.0 - 11.0 10e3/uL 8.1   Hemoglobin 13.3 - 17.7 g/dL 16.2   Hematocrit 40.0 - 53.0 % 44.9   Platelet Count 150 - 450 10e3/uL 85 (L)   RBC Count 4.40 - 5.90 10e6/uL 4.69   MCV 78 - 100 fL 96   MCH 26.5 - 33.0 pg 34.5 (H)   MCHC 31.5 - 36.5 g/dL 36.1   RDW 10.0 - 15.0 % 12.2   % Neutrophils % 59   % Lymphocytes % 32   % Monocytes % 8   % Eosinophils % 1   % Basophils % 0   Absolute Basophils 0.0 - 0.2 10e3/uL 0.0   Absolute Eosinophils 0.0 - 0.7 10e3/uL 0.1   Absolute Immature Granulocytes <=0.4 10e3/uL 0.0   Absolute Lymphocytes 0.8 - 5.3 10e3/uL 2.6   Absolute Monocytes 0.0 - 1.3 10e3/uL 0.7   % Immature Granulocytes % 0   Absolute Neutrophils 1.6 - 8.3 10e3/uL 4.7   Absolute NRBCs 10e3/uL 0.0   NRBCs per 100 WBC <1 /100 0   INR 0.85 - 1.15  2.82 (H)   (L): Data is abnormally low  (H): Data is abnormally high    ASSESSMENT:     1.  Myeloproliferative syndrome/MDS. STEVIE 2 positive  2.  Status post non-myeloablative allo-sib peripheral blood stem cell transplant 2007  3.  History of chronic taouj-ijhksd-ccmv disease.   4.  History of Hodgkin's disease 2011 s/p ABVD x 5 cycles.    5.  History of cardiac arrhythmias, SVT and V tach.   6.  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   7.  History of cirrhosis.   8.  History of pulmonary emboli.   9.  History of elevated hemoglobin A1c.   10. Thyroid nodule.    11. Cholelithiasis.     12. Diverticulosis.   13. Anticoagulation with warfarin for history of DVT, PE, and paroxysmal atrial fibrillation  14. Status post bilateral knee replacement.     15. Aortic stenosis  16. 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  17. Seborrheic keratosis of the back.  18. Weight loss, dysphagia, anorexia related to Parkinson's  19. Parkinson's  20. 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 Chronic aspiration related to dysphagia  22. Thrombocytopenia  23. Mild reflux     ASSESSMENT:    Haresh's platelet count has been stable since June 2022 (90 then, 85 today). He has maintained his weight since Apr 2023 (173lbs today). I wonder if he has portal hypertension and splenic sequestration due to previous cirrhosis.  Have not  done a doppler of portal vein for quite a Joselin should receive another COVID booster vaccine in the Fall pending future CDC recommendations.Will not do a phlebotomy at this time Continue warfarin, INR 2.82 today     I will see Haresh again in 4 months.     I spent a total of 30 minutes face to face with Haresh Rock during today's office visit. Over 50% of this time was spent counseling the patient and coordinating the care regarding Hodgkins and BMT and 10 minutes preparing to see the patient and  care coordination      Augusto Miller MD  [phone]

## 2023-06-09 ENCOUNTER — TRANSFERRED RECORDS (OUTPATIENT)
Dept: HEALTH INFORMATION MANAGEMENT | Facility: CLINIC | Age: 73
End: 2023-06-09
Payer: COMMERCIAL

## 2023-06-09 ENCOUNTER — DOCUMENTATION ONLY (OUTPATIENT)
Dept: ANTICOAGULATION | Facility: CLINIC | Age: 73
End: 2023-06-09
Payer: COMMERCIAL

## 2023-06-09 DIAGNOSIS — I48.0 PAROXYSMAL ATRIAL FIBRILLATION (H): ICD-10-CM

## 2023-06-09 DIAGNOSIS — Z86.711 PERSONAL HISTORY OF PE (PULMONARY EMBOLISM): ICD-10-CM

## 2023-06-09 DIAGNOSIS — I48.20 CHRONIC ATRIAL FIBRILLATION (H): ICD-10-CM

## 2023-06-09 DIAGNOSIS — Z79.01 LONG TERM CURRENT USE OF ANTICOAGULANT THERAPY: Primary | ICD-10-CM

## 2023-06-09 LAB — INR (EXTERNAL): 2.1 (ref 0.9–1.1)

## 2023-06-09 NOTE — PROGRESS NOTES
ANTICOAGULATION  MANAGEMENT-Home Monitor Managed by Exception    Hareshrgoer Rock 73 year old male is on warfarin with therapeutic INR result. (Goal INR 2.0-3.0)    Recent labs: (last 7 days)     06/09/23  0905   INR 2.1*         Previous INR was Therapeutic    Medication, diet, health changes since last INR:chart reviewed; none identified    Contacted within the last 12 weeks by phone on 6/6/23      MINNIE Hutson was NOT contacted regarding therapeutic result today per home monitoring policy manage by exception agreement.   Current warfarin dose is to be continued:     Summary  As of 6/9/2023    Full warfarin instructions:  2.5 mg every Tue, Fri; 3.75 mg all other days   Next INR check:  6/16/2023           ?   Allyn Doe RN  Anticoagulation Clinic  6/9/2023    _______________________________________________________________________     Anticoagulation Episode Summary     Current INR goal:  2.0-3.0   TTR:  86.8 % (1 y)   Target end date:  Indefinite   Send INR reminders to:  [employer] [facility]    Indications    Long-term (current) use of anticoagulants [Z79.01] [Z79.01]  Atrial fibrillation (H) [I48.91]  Antiphospholipid antibody syndrome (H) (Resolved) [D68.61]  Personal history of DVT (deep vein thrombosis) (Resolved) [Z86.718]  Atrial fibrillation  unspecified type (H) (Resolved) [I48.91]  Paroxysmal atrial fibrillation (H) [I48.0]  Chronic atrial fibrillation (H) [I48.20]  Personal history of PE (pulmonary embolism) [Z86.711]           Comments:  JESSICA rodas to manage by exception         Anticoagulation Care Providers     Provider Role Specialty Phone number    Anya Nowak MD Referring Family Medicine [phone]

## 2023-06-15 NOTE — PROGRESS NOTES
This is a recent snapshot of the patient's [facility] medical record.  For current drug dose and complete information and questions, call [phone] or In Basket pool, [facility] home infusion ([id number])  CSN Number:  [id number]

## 2023-06-16 ENCOUNTER — TRANSFERRED RECORDS (OUTPATIENT)
Dept: HEALTH INFORMATION MANAGEMENT | Facility: CLINIC | Age: 73
End: 2023-06-16
Payer: COMMERCIAL

## 2023-06-16 ENCOUNTER — DOCUMENTATION ONLY (OUTPATIENT)
Dept: ANTICOAGULATION | Facility: CLINIC | Age: 73
End: 2023-06-16
Payer: COMMERCIAL

## 2023-06-16 LAB — INR (EXTERNAL): 2.9 (ref 0.9–1.1)

## 2023-06-16 NOTE — PROGRESS NOTES
Incoming fax from [employer] home monitor [employer]    Date of result  6/16/23 9:04 AM    INR result 2.9      ANTICOAGULATION  MANAGEMENT-Home Monitor Managed by Exception    Haresh EMILIE Rock 73 year old male is on warfarin with therapeutic INR result. (Goal INR 2.0-3.0)    Recent labs: (last 7 days)     06/16/23  0904   INR 2.9*         Previous INR was Therapeutic    Medication, diet, health changes since last INR:chart reviewed; none identified    Contacted within the last 12 weeks by phone on  6/6/23      MINNIE Hutson was NOT contacted regarding therapeutic result today per home monitoring policy manage by exception agreement.   Current warfarin dose is to be continued:     Summary  As of 6/16/2023    Full warfarin instructions:  2.5 mg every Tue, Fri; 3.75 mg all other days   Next INR check:  6/23/2023           ?   Gabriela Sosa RN  Anticoagulation Clinic  6/16/2023    _______________________________________________________________________     Anticoagulation Episode Summary     Current INR goal:  2.0-3.0   TTR:  86.8 % (1 y)   Target end date:  Indefinite   Send INR reminders to:  [facility]    Indications    Long-term (current) use of anticoagulants [Z79.01] [Z79.01]  Atrial fibrillation (H) [I48.91]  Antiphospholipid antibody syndrome (H) (Resolved) [D68.61]  Personal history of DVT (deep vein thrombosis) (Resolved) [Z86.718]  Atrial fibrillation  unspecified type (H) (Resolved) [I48.91]  Paroxysmal atrial fibrillation (H) [I48.0]  Chronic atrial fibrillation (H) [I48.20]  Personal history of PE (pulmonary embolism) [Z86.711]           Comments:  JESSICA rodas to manage by exception         Anticoagulation Care Providers     Provider Role Specialty Phone number    Anya Nowak MD Referring Family Medicine [phone]

## 2023-06-21 ENCOUNTER — PATIENT OUTREACH (OUTPATIENT)
Dept: CARE COORDINATION | Facility: CLINIC | Age: 73
End: 2023-06-21
Payer: COMMERCIAL

## 2023-06-21 NOTE — PROGRESS NOTES
Clinic Care Coordination Contact  Care Coordination Clinician Chart Review    Situation: Patient chart reviewed by Care Coordinator.       Background: Care Coordination Program started: 11/5/2021. Initial assessment completed and patient-centered care plan(s) were developed with participation from patient. Lead CC handed patient off to CHW for continued outreaches.       Assessment: Per chart review, patient outreach completed by CC CHW on 5/24/23.  Patient is actively working to accomplish goal(s). Patient's goal(s) appropriate and relevant at this time. Patient is due for updated Plan of Care.  Assessments will be completed annually or as needed/with change of patient status.      Care Plan: General  Work on increasing strength and stamina for a year after reaching 100%     Problem: HP GENERAL PROBLEM     Goal: General  work on walking without the walker, and increasing my strength and stamina.  For at least 1 year after reaching 100%.     Start Date: 12/13/2021 Expected End Date: 6/13/2024    This Visit's Progress: 80% Recent Progress: 70%    Note:     Barriers: suffers from parkinson's  Strengths: engaged in care coordination  Patient expressed understanding of goal: yes  Action steps to achieve this goal:  1. I will continue walking without the walker. No longer using the walker as of 3/15/22. Completed action step  2. I will go outside without the walker when I feel strong enough and have increased my strength and stamina.  Continue to work on for at least a year.  3. I will frequently walk around inside the house to increase my strength and stamina.  Continue to work on for at least a year.                    Care Plan: General - Working on balance and items in the path,     Problem: HP GENERAL PROBLEM     Goal: General Goal - work on balance and avoiding items in the path     Start Date: 10/28/2022 Expected End Date: 10/28/2023    This Visit's Progress: 60% Recent Progress: 50%    Note:     Barriers:  parkinson's  Strengths: engaged in care coordination  Patient expressed understanding of goal: yes   Action steps to achieve this goal:  1. I will work on walking around items in my path without losing my balance.  2. I will work on maintaining my balance when I walk through the house.  3. I will use my walker or cane as needed to maintain my balance.                             Plan/Recommendations: The patient will continue working with Care Coordination to achieve goal(s) as above. CHW will continue outreaches at minimum every 30 days and will involve Lead CC as needed or if patient is ready to move to Maintenance. Lead CC will continue to monitor CHW outreaches and patient's progress to goal(s) every 6 weeks.     Plan of Care updated and sent to patient: Yes, via [facility].          Uriel Zhou MSN, RN, PHN, CCM   Primary Care Clinical RN Care Coordinator  [facility]  6/21/2023   4:13 PM  [email or web address]  Office: [phone]

## 2023-06-21 NOTE — LETTER
[facility]  Patient Centered Plan of Care  About Me:        Patient Name:  Haresh Granados    YOB: 1950  Age:         73 year old   Radha MRN:    [id number] Telephone Information:  Home Phone [phone]   Mobile [phone]       Address:  [address] [place] [address] Email address:  [email or web address]      Emergency Contact(s)    Name Relationship Lgl Grd Work Phone Home Phone Mobile Phone   1. JANICE GRANADOS Spouse No   [phone]   2. LAY GRANADOS Daughter No   [phone]   3. SOL GRANADOS Son No   [phone]           Primary language:  English     needed? No   [employer]:  [phone] op. 1  Other communication barriers:Glasses; Language barrier    Preferred Method of Communication:  Mail  Current living arrangement: I live in a private home with family    Mobility Status/ Medical Equipment: Independent        Health Maintenance  Health Maintenance Reviewed: Due/Overdue   Health Maintenance Due   Topic Date Due    HF ACTION PLAN  Never done    ZOSTER IMMUNIZATION (1 of 2) 11/14/2016    EYE EXAM  01/01/2022    DTAP/TDAP/TD IMMUNIZATION (2 - Td or Tdap) 09/11/2022           My Access Plan  Medical Emergency 911   Primary Clinic Line [facility] [phone]   24 Hour Appointment Line [phone] or  [phone] ([phone]) (toll-free)   24 Hour Nurse Line [phone] (toll-free)   Preferred Urgent Care No data recorded   Preferred Hospital [facility]  [phone]     Preferred Pharmacy [id number] [place] - [address] [facility]     Behavioral Health Crisis Line The National Suicide Prevention Lifeline at 1-780.356.8422 or Text/Call 618             My Care Team Members  Patient Care Team         Relationship Specialty Notifications Start End    Anya Nowak MD PCP - General Family Practice  4/23/19     Phone: [phone] Fax:  [phone] [address] [place] [address]. [facility] [address]    Augusto Miller MD MD Hematology Admissions 9/30/11     Referred to Endocrinology    Phone: [phone] Fax: [phone]         [address] [facility] [address]    Jesusita Mejia PA-C Physician Assistant   3/21/12     Phone: [phone] Fax: [phone]         [address] [facility] [address] [facility] [address]    Pino Sharma MD MD Internal Medicine  4/11/16     Phone: [phone] Fax: [phone]         [address] [facility] [address] [facility] [address] [facility] [address]    Fabi Jimenez MD MD INTERNAL MEDICINE - ENDOCRINOLOGY, DIABETES & METABOLISM  4/18/16     Phone: [phone] Fax: [phone]         [address] [facility] [address] [facility] [address]    Bekah Matt MD MD Cardiology  8/12/16     Phone: [phone] Fax: [phone]         [address] [facility] [address] [facility] [address]    Tina Mejia RN Specialty Care Coordinator Cardiology  3/6/19     Phone: [phone] Fax: [phone]         [address] [facility] [address]    Anya Nowak MD Assigned PCP   4/28/19     Phone: [phone] Fax: [phone]         [address] [facility] [address]    Bekah Matt MD Assigned Heart and Vascular Provider   10/23/20     Phone: [phone] Fax: [phone]         [address] [facility] [address] [facility] [address]    Ángel Hill MD Assigned Neuroscience Provider   11/15/20     Phone: [phone] Fax: [phone]         [address] [place] [address]    Rosalva Samuels MD MD Family Medicine  6/1/21     Phone: [phone] Fax: [phone] [address] [facility] [address]    Bekah Matt MD MD Clinical Cardiac Electrophysiology  9/10/21     Phone: [phone] Fax: [phone]         [address] [place] [address]    Marcelo Jacques MD Assigned Infectious Disease Provider   9/12/21     Phone: [phone] Fax: [phone]         [address] [facility]  [place] [address]    Uriel Van, RN Lead Care Coordinator Primary Care - CC Admissions 11/5/21     Phone: [phone] Fax: [phone]        Lizbeth Chen [facility] Pharmacist Pharmacist Ambulatory Care  11/12/21     Phone: [phone] [address] [facility] [address]    Vahid Edward MD Assigned Pulmonology Provider   12/12/21     Phone: [phone] Fax: [phone]         [address] [place] [address]    Leslie Clifford [facility] Pharmacist Pharmacist  12/13/21     Phone: [phone] Pager: [phone] [address] [facility] [address]    Bonnie Walls RN Specialty Care Coordinator Hematology & Oncology Admissions 12/16/21     Phone: [phone] Pager: [phone]        Maye Rain Community [facility] Worker Primary Care - CC Admissions 4/18/22     Phone: [phone] Fax: [phone]        Reg Cantu MD MD Critical Care  6/9/22     Phone: [phone] Fax: [phone]         [address] [address]    Reg Cantu MD Assigned Surgical Provider   2/18/23     Phone: [phone] Fax: [phone]         [address] [address]    Leslie Clifford [facility] Assigned [place] Pharmacist   5/13/23     Phone: [phone] Pager: [phone] [address] [facility] [address]              My Care Plans  Self Management and Treatment Plan  Care Plan  Care Plan: General  Work on increasing strength and stamina for a year after reaching 100%       Problem: HP GENERAL PROBLEM       Goal: General  work on walking without the walker, and increasing my strength and stamina.  For at least 1 year after reaching 100%.       Start Date: 12/13/2021 Expected End Date: 6/13/2024    This Visit's Progress: 80% Recent Progress: 70%    Note:     Barriers: suffers from parkinson's  Strengths: engaged in care coordination  Patient expressed understanding of goal: yes  Action steps to achieve this goal:  1. I  will continue walking without the walker. No longer using the walker as of 3/15/22. Completed action step  2. I will go outside without the walker when I feel strong enough and have increased my strength and stamina.  Continue to work on for at least a year.  3. I will frequently walk around inside the house to increase my strength and stamina.  Continue to work on for at least a year.                            Care Plan: General - Working on balance and items in the path,       Problem: HP GENERAL PROBLEM       Goal: General Goal - work on balance and avoiding items in the path       Start Date: 10/28/2022 Expected End Date: 10/28/2023    This Visit's Progress: 60% Recent Progress: 50%    Note:     Barriers: parkinson's  Strengths: engaged in care coordination  Patient expressed understanding of goal: yes   Action steps to achieve this goal:  1. I will work on walking around items in my path without losing my balance.  2. I will work on maintaining my balance when I walk through the house.  3. I will use my walker or cane as needed to maintain my balance.                                 Action Plans on File:                       Advance Care Plans/Directives Type:   Advanced Directive - On File      My Medical and Care Information  Problem List   Patient Active Problem List   Diagnosis    Hemochromatosis    Uuuhm-ryquxo-vhki disease, chronic (H)    Advanced directives, counseling/discussion    Polyneuropathy    Status post total knee replacements    Status post bone marrow transplant (H)    Hyperlipidemia with target LDL less than 100    Atrial fibrillation (H)    Chronic rhinitis    Gallstones without obstruction of gallbladder    Long-term (current) use of anticoagulants [Z79.01]    Thyroid nodule    Type 2 diabetes mellitus with stage 2 chronic kidney disease, without long-term current use of insulin (H)    History of Hodgkin's lymphoma    History of irradiation, presenting hazards to health    Primary  pulmonary hypertension (H)    Hereditary hemochromatosis (H)    Oropharyngeal dysphagia    Articulation disorder    Personal history of PE (pulmonary embolism)    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)    Abnormal dopamine scan ([facility]TSCAN) 2020    ACP (advance care planning)    Thrombocytopenia (H)    Paroxysmal atrial fibrillation (H)    Benign prostatic hyperplasia with urinary retention    Constipation    Parkinson's disease (H)    Gastrostomy tube in place (H)    Chronic atrial fibrillation (H)      Current Medications and Allergies:  See printed Medication Report.    Care Coordination Start Date: 11/5/2021   Frequency of Care Coordination: monthly     Form Last Updated: 06/21/2023

## 2023-06-23 ENCOUNTER — TRANSFERRED RECORDS (OUTPATIENT)
Dept: HEALTH INFORMATION MANAGEMENT | Facility: CLINIC | Age: 73
End: 2023-06-23
Payer: COMMERCIAL

## 2023-06-23 ENCOUNTER — DOCUMENTATION ONLY (OUTPATIENT)
Dept: ANTICOAGULATION | Facility: CLINIC | Age: 73
End: 2023-06-23
Payer: COMMERCIAL

## 2023-06-23 DIAGNOSIS — Z86.711 PERSONAL HISTORY OF PE (PULMONARY EMBOLISM): ICD-10-CM

## 2023-06-23 DIAGNOSIS — Z79.01 LONG TERM CURRENT USE OF ANTICOAGULANT THERAPY: Primary | ICD-10-CM

## 2023-06-23 DIAGNOSIS — I48.0 PAROXYSMAL ATRIAL FIBRILLATION (H): ICD-10-CM

## 2023-06-23 DIAGNOSIS — I48.20 CHRONIC ATRIAL FIBRILLATION (H): ICD-10-CM

## 2023-06-23 LAB — INR (EXTERNAL): 2.2 (ref 0.9–1.1)

## 2023-06-23 NOTE — PROGRESS NOTES
ANTICOAGULATION  MANAGEMENT-Home Monitor Managed by Exception    Hareshroger Rock 73 year old male is on warfarin with therapeutic INR result. (Goal INR 2.0-3.0)    Recent labs: (last 7 days)     06/23/23  1031   INR 2.2*         Previous INR was Therapeutic    Medication, diet, health changes since last INR:chart reviewed; none identified    Contacted within the last 12 weeks by phone on 6/6/23      MINNIE Hutson was NOT contacted regarding therapeutic result today per home monitoring policy manage by exception agreement.   Current warfarin dose is to be continued:     Summary  As of 6/23/2023    Full warfarin instructions:  2.5 mg every Tue, Fri; 3.75 mg all other days   Next INR check:  6/30/2023           ?   Allyn Doe RN  Anticoagulation Clinic  6/23/2023    _______________________________________________________________________     Anticoagulation Episode Summary     Current INR goal:  2.0-3.0   TTR:  86.7 % (1 y)   Target end date:  Indefinite   Send INR reminders to:  [facility]    Indications    Long-term (current) use of anticoagulants [Z79.01] [Z79.01]  Atrial fibrillation (H) [I48.91]  Antiphospholipid antibody syndrome (H) (Resolved) [D68.61]  Personal history of DVT (deep vein thrombosis) (Resolved) [Z86.718]  Atrial fibrillation  unspecified type (H) (Resolved) [I48.91]  Paroxysmal atrial fibrillation (H) [I48.0]  Chronic atrial fibrillation (H) [I48.20]  Personal history of PE (pulmonary embolism) [Z86.711]           Comments:  JESSICA rodas to manage by exception         Anticoagulation Care Providers     Provider Role Specialty Phone number    Anya Nowak MD Referring Family Medicine [phone]

## 2023-06-27 DIAGNOSIS — D68.61 ANTIPHOSPHOLIPID ANTIBODY SYNDROME (H): ICD-10-CM

## 2023-06-27 RX ORDER — WARFARIN SODIUM 2.5 MG/1
TABLET ORAL
Qty: 180 TABLET | Refills: 1 | Status: SHIPPED | OUTPATIENT
Start: 2023-06-27 | End: 2024-01-04

## 2023-06-27 NOTE — TELEPHONE ENCOUNTER
ANTICOAGULATION MANAGEMENT:  Medication Refill    Anticoagulation Summary  As of 6/23/2023    Warfarin maintenance plan:  2.5 mg (2.5 mg x 1) every Tue, Fri; 3.75 mg (2.5 mg x 1.5) all other days   Next INR check:  6/30/2023   Target end date:  Indefinite    Indications    Long-term (current) use of anticoagulants [Z79.01] [Z79.01]  Atrial fibrillation (H) [I48.91]  Antiphospholipid antibody syndrome (H) (Resolved) [D68.61]  Personal history of DVT (deep vein thrombosis) (Resolved) [Z86.718]  Atrial fibrillation  unspecified type (H) (Resolved) [I48.91]  Paroxysmal atrial fibrillation (H) [I48.0]  Chronic atrial fibrillation (H) [I48.20]  Personal history of PE (pulmonary embolism) [Z86.711]             Anticoagulation Care Providers     Provider Role Specialty Phone number    Anya Nowak MD Referring Family Medicine [phone]          Visit with referring provider/group within last year: Yes    [facility] referral signed last signed: 06/20/2022; within last year: Yes    Haresh meets all criteria for refill (current [facility] referral, office visit with referring provider/group in last year, lab monitoring up to date or not exceeding 2 weeks overdue). Rx instructions and quantity supplied updated to match patient's current dosing plan. Warfarin 90 day supply with 1 refill granted per [facility] protocol     Allyn Doe RN  Anticoagulation Clinic

## 2023-06-28 ENCOUNTER — PATIENT OUTREACH (OUTPATIENT)
Dept: CARE COORDINATION | Facility: CLINIC | Age: 73
End: 2023-06-28
Payer: COMMERCIAL

## 2023-06-28 NOTE — PROGRESS NOTES
Clinic Care Coordination Contact    Community Health Worker Follow Up    Care Gaps:     Health Maintenance Due   Topic Date Due     HF ACTION PLAN  Never done     ZOSTER IMMUNIZATION (1 of 2) 11/14/2016     EYE EXAM  01/01/2022     DTAP/TDAP/TD IMMUNIZATION (2 - Td or Tdap) 09/11/2022       Patient will discuss at next PCP visit.     Care Plan:   Care Plan: General  Work on increasing strength and stamina for a year after reaching 100%     Problem: HP GENERAL PROBLEM     Goal: General  work on walking without the walker, and increasing my strength and stamina.  For at least 1 year after reaching 100%.     Start Date: 12/13/2021 Expected End Date: 6/13/2024    This Visit's Progress: 90% Recent Progress: 80%    Note:     Barriers: suffers from parkinson's  Strengths: engaged in care coordination  Patient expressed understanding of goal: yes  Action steps to achieve this goal:  1. I will continue walking without the walker. No longer using the walker as of 3/15/22. Completed action step  2. I will go outside without the walker when I feel strong enough and have increased my strength and stamina.  Continue to work on for at least a year.  3. I will frequently walk around inside the house to increase my strength and stamina.  Continue to work on for at least a year.                    Care Plan: General - Working on balance and items in the path,     Problem: HP GENERAL PROBLEM     Goal: General Goal - work on balance and avoiding items in the path     Start Date: 10/28/2022 Expected End Date: 10/28/2023    This Visit's Progress: 70% Recent Progress: 60%    Note:     Barriers: parkinson's  Strengths: engaged in care coordination  Patient expressed understanding of goal: yes   Action steps to achieve this goal:  1. I will work on walking around items in my path without losing my balance.  2. I will work on maintaining my balance when I walk through the house.  3. I will use my walker or cane as needed to maintain my  balance.                          Intervention and Education during outreach: Patient is doing well. He did state that he has had difficulty with scheduling his G-tube replacement which is supposed to be done every 6 months. He can't get in until September but is currently on a wait list. He will schedule his next one when he has it done in September.     Patient hasn't been able to get out in the yard much lately due to the air quality. He is trying to remain active in his home.     CHW Plan: CHW will continue to support patient with goals through routine scheduled outreach.     Next outreach due: 7/26/23

## 2023-06-30 ENCOUNTER — DOCUMENTATION ONLY (OUTPATIENT)
Dept: ANTICOAGULATION | Facility: CLINIC | Age: 73
End: 2023-06-30
Payer: COMMERCIAL

## 2023-06-30 ENCOUNTER — TRANSFERRED RECORDS (OUTPATIENT)
Dept: HEALTH INFORMATION MANAGEMENT | Facility: CLINIC | Age: 73
End: 2023-06-30
Payer: COMMERCIAL

## 2023-06-30 DIAGNOSIS — Z79.01 LONG TERM CURRENT USE OF ANTICOAGULANT THERAPY: Primary | ICD-10-CM

## 2023-06-30 DIAGNOSIS — I48.20 CHRONIC ATRIAL FIBRILLATION (H): ICD-10-CM

## 2023-06-30 DIAGNOSIS — Z86.711 PERSONAL HISTORY OF PE (PULMONARY EMBOLISM): ICD-10-CM

## 2023-06-30 DIAGNOSIS — I48.0 PAROXYSMAL ATRIAL FIBRILLATION (H): ICD-10-CM

## 2023-06-30 LAB — INR HOME MONITORING: 2.2 RATIO (ref 2–3)

## 2023-06-30 NOTE — PROGRESS NOTES
ANTICOAGULATION  MANAGEMENT-Home Monitor Managed by Exception    Haresh EMILIE Rock 73 year old male is on warfarin with therapeutic INR result. (Goal INR 2.0-3.0)    Recent labs: (last 7 days)     06/30/23  1247   INR 2.2         Previous INR was Therapeutic    Medication, diet, health changes since last INR:chart reviewed; none identified    Contacted within the last 12 weeks by phone on 6/6/23      MINNIE Hutson was NOT contacted regarding therapeutic result today per home monitoring policy manage by exception agreement.   Current warfarin dose is to be continued:     Summary  As of 6/30/2023    Full warfarin instructions:  2.5 mg every Tue, Fri; 3.75 mg all other days   Next INR check:  7/7/2023           ?   Shanice Dhillon RN  Anticoagulation Clinic  6/30/2023    _______________________________________________________________________     Anticoagulation Episode Summary     Current INR goal:  2.0-3.0   TTR:  86.7 % (1 y)   Target end date:  Indefinite   Send INR reminders to:  [facility]    Indications    Long-term (current) use of anticoagulants [Z79.01] [Z79.01]  Atrial fibrillation (H) [I48.91]  Antiphospholipid antibody syndrome (H) (Resolved) [D68.61]  Personal history of DVT (deep vein thrombosis) (Resolved) [Z86.718]  Atrial fibrillation  unspecified type (H) (Resolved) [I48.91]  Paroxysmal atrial fibrillation (H) [I48.0]  Chronic atrial fibrillation (H) [I48.20]  Personal history of PE (pulmonary embolism) [Z86.711]           Comments:  JESSICA rodas to manage by exception         Anticoagulation Care Providers     Provider Role Specialty Phone number    Anya Nowak MD Referring Family Medicine [phone]

## 2023-07-03 ENCOUNTER — DOCUMENTATION ONLY (OUTPATIENT)
Dept: ANTICOAGULATION | Facility: CLINIC | Age: 73
End: 2023-07-03
Payer: COMMERCIAL

## 2023-07-03 DIAGNOSIS — I48.0 PAROXYSMAL ATRIAL FIBRILLATION (H): ICD-10-CM

## 2023-07-03 DIAGNOSIS — Z79.01 LONG TERM CURRENT USE OF ANTICOAGULANT THERAPY: Primary | ICD-10-CM

## 2023-07-03 DIAGNOSIS — I48.20 CHRONIC ATRIAL FIBRILLATION (H): ICD-10-CM

## 2023-07-03 DIAGNOSIS — Z86.711 PERSONAL HISTORY OF PE (PULMONARY EMBOLISM): ICD-10-CM

## 2023-07-03 NOTE — PROGRESS NOTES
ANTICOAGULATION CLINIC REFERRAL RENEWAL REQUEST       An annual renewal order is required for all patients referred to [facility].?  Please review and sign the pended referral order for Haresh Rock.       ANTICOAGULATION SUMMARY      Warfarin indication(s)   Atrial Fibrillation, DVT, PE and Antiphospholipid antibody syndrome     Mechanical heart valve present?  NO       Current goal range   INR: 2.0-3.0     Goal appropriate for indication? Goal INR 2-3, standard for indication(s) above     Time in Therapeutic Range (TTR)  (Goal > 60%) 87%       Office visit with referring provider's group within last year yes on 10/11/22       Shanice Hyde RN  [facility]

## 2023-07-07 ENCOUNTER — DOCUMENTATION ONLY (OUTPATIENT)
Dept: ANTICOAGULATION | Facility: CLINIC | Age: 73
End: 2023-07-07
Payer: COMMERCIAL

## 2023-07-07 DIAGNOSIS — I48.20 CHRONIC ATRIAL FIBRILLATION (H): ICD-10-CM

## 2023-07-07 DIAGNOSIS — I48.0 PAROXYSMAL ATRIAL FIBRILLATION (H): ICD-10-CM

## 2023-07-07 DIAGNOSIS — Z86.711 PERSONAL HISTORY OF PE (PULMONARY EMBOLISM): ICD-10-CM

## 2023-07-07 DIAGNOSIS — Z79.01 LONG TERM CURRENT USE OF ANTICOAGULANT THERAPY: Primary | ICD-10-CM

## 2023-07-07 LAB — INR HOME MONITORING: 2.3 RATIO (ref 2–3)

## 2023-07-07 NOTE — PROGRESS NOTES
ANTICOAGULATION  MANAGEMENT-Home Monitor Managed by Exception    Haresh EMILIE Rock 73 year old male is on warfarin with therapeutic INR result. (Goal INR 2.0-3.0)    Recent labs: (last 7 days)     07/07/23  1225   INR 2.3         Previous INR was Therapeutic    Medication, diet, health changes since last INR:chart reviewed; none identified    Contacted within the last 12 weeks by phone on 6/6/23      MINNIE Hutson was NOT contacted regarding therapeutic result today per home monitoring policy manage by exception agreement.   Current warfarin dose is to be continued:     Summary  As of 7/7/2023    Full warfarin instructions:  2.5 mg every Tue, Fri; 3.75 mg all other days   Next INR check:  7/14/2023           ?   Shanice Hyde RN  Anticoagulation Clinic  7/7/2023    _______________________________________________________________________     Anticoagulation Episode Summary     Current INR goal:  2.0-3.0   TTR:  86.7 % (1 y)   Target end date:  Indefinite   Send INR reminders to:  [facility]    Indications    Long-term (current) use of anticoagulants [Z79.01] [Z79.01]  Atrial fibrillation (H) [I48.91]  Antiphospholipid antibody syndrome (H) (Resolved) [D68.61]  Personal history of DVT (deep vein thrombosis) (Resolved) [Z86.718]  Atrial fibrillation  unspecified type (H) (Resolved) [I48.91]  Paroxysmal atrial fibrillation (H) [I48.0]  Chronic atrial fibrillation (H) [I48.20]  Personal history of PE (pulmonary embolism) [Z86.711]           Comments:  JESSICA rodas to manage by exception         Anticoagulation Care Providers     Provider Role Specialty Phone number    Anya Nowak MD Referring Family Medicine [phone]    Elizabeth Balderas MD Referring Internal Medicine [phone]

## 2023-07-10 DIAGNOSIS — G20.A1 PARKINSON DISEASE (H): ICD-10-CM

## 2023-07-10 RX ORDER — CARBIDOPA AND LEVODOPA 25; 100 MG/1; MG/1
TABLET ORAL
Qty: 270 TABLET | Refills: 3 | Status: SHIPPED | OUTPATIENT
Start: 2023-07-10 | End: 2023-10-09

## 2023-07-10 NOTE — TELEPHONE ENCOUNTER
Called and spoke with patient. Let him know that according to our system, [facility] should still have refills for him. He stated they told him that they have no more refills. He states that he does have 5 more days left of medication, but didn't think [facility] would be able to get him the medication in that time. He would like a couple weeks worth sent to the [facility] in [place] to hold him over until [facility] can get him his normal supply.

## 2023-07-10 NOTE — TELEPHONE ENCOUNTER
Writer spoke with [facility]. They report that carbidopa-levodopa prescription ordered on 1/23/23 was cancelled by the patient. Likely this was done in error. They will need new order placed and report that E-scribing would be more efficient than providing a verbal order. This was reordered again today. It can take up to 7-10 days for the request to be processed and delivered so writer called-in a 2 week supply to [facility] pharmacy. Writer unable to reach patient x2 so a detailed message was left informing him that 2 week supply ordered to his [facility] pharmacy and new order was sent to [facility]. He was given a call back number if any concerns.     My chart update was also sent.         Pily Busby, RNCC  Neurology/Neurosurgery

## 2023-07-10 NOTE — TELEPHONE ENCOUNTER
[facility] Call Center    Phone Message    May a detailed message be left on voicemail: yes     Reason for Call: Medication Refill Request    Has the patient contacted the pharmacy for the refill? Yes   Name of medication being requested: carbidopa-levodopa (SINEMET)  MG tablet  Provider who prescribed the medication: Ángel Hill.   Pharmacy: [facility] mail order  Date medication is needed: ASAP   Patient is out of medication      Action Taken: Message routed to:  [facility] ([facility]):  Neurology    Travel Screening: Not Applicable

## 2023-07-15 ENCOUNTER — TRANSFERRED RECORDS (OUTPATIENT)
Dept: HEALTH INFORMATION MANAGEMENT | Facility: CLINIC | Age: 73
End: 2023-07-15
Payer: COMMERCIAL

## 2023-07-15 LAB — INR HOME MONITORING: 2.6 RATIO (ref 2–3)

## 2023-07-17 ENCOUNTER — DOCUMENTATION ONLY (OUTPATIENT)
Dept: ANTICOAGULATION | Facility: CLINIC | Age: 73
End: 2023-07-17
Payer: COMMERCIAL

## 2023-07-17 ENCOUNTER — VIRTUAL VISIT (OUTPATIENT)
Dept: PHARMACY | Facility: CLINIC | Age: 73
End: 2023-07-17
Payer: COMMERCIAL

## 2023-07-17 DIAGNOSIS — I48.0 PAROXYSMAL ATRIAL FIBRILLATION (H): ICD-10-CM

## 2023-07-17 DIAGNOSIS — K21.00 GASTROESOPHAGEAL REFLUX DISEASE WITH ESOPHAGITIS WITHOUT HEMORRHAGE: ICD-10-CM

## 2023-07-17 DIAGNOSIS — I10 ESSENTIAL HYPERTENSION: ICD-10-CM

## 2023-07-17 DIAGNOSIS — Z79.01 LONG TERM CURRENT USE OF ANTICOAGULANT THERAPY: Primary | ICD-10-CM

## 2023-07-17 DIAGNOSIS — Z86.711 PERSONAL HISTORY OF PE (PULMONARY EMBOLISM): ICD-10-CM

## 2023-07-17 DIAGNOSIS — I48.20 CHRONIC ATRIAL FIBRILLATION (H): ICD-10-CM

## 2023-07-17 DIAGNOSIS — Z93.1 GASTROSTOMY TUBE IN PLACE (H): ICD-10-CM

## 2023-07-17 DIAGNOSIS — E43 SEVERE PROTEIN-CALORIE MALNUTRITION (H): ICD-10-CM

## 2023-07-17 DIAGNOSIS — R13.12 OROPHARYNGEAL DYSPHAGIA: ICD-10-CM

## 2023-07-17 DIAGNOSIS — E78.5 HYPERLIPIDEMIA WITH TARGET LDL LESS THAN 100: ICD-10-CM

## 2023-07-17 DIAGNOSIS — G20.A1 PARKINSON DISEASE (H): Primary | ICD-10-CM

## 2023-07-17 DIAGNOSIS — J31.0 CHRONIC RHINITIS: ICD-10-CM

## 2023-07-17 DIAGNOSIS — Z78.9 TAKES DIETARY SUPPLEMENTS: ICD-10-CM

## 2023-07-17 PROCEDURE — 99605 MTMS BY PHARM NP 15 MIN: CPT | Mod: VID | Performed by: PHARMACIST

## 2023-07-17 PROCEDURE — 99607 MTMS BY PHARM ADDL 15 MIN: CPT | Mod: VID | Performed by: PHARMACIST

## 2023-07-17 NOTE — PROGRESS NOTES
ANTICOAGULATION  MANAGEMENT-Home Monitor Managed by Exception    Haresh EMILIE Rock 73 year old male is on warfarin with therapeutic INR result. (Goal INR 2.0-3.0)    Recent labs: (last 7 days)     07/15/23  1017   INR 2.6         Previous INR was Therapeutic    Medication, diet, health changes since last INR:chart reviewed; none identified    Contacted within the last 12 weeks by phone on 6/6/23      MINNIE Hutson was NOT contacted regarding therapeutic result today per home monitoring policy manage by exception agreement.   Current warfarin dose is to be continued:     Summary  As of 7/17/2023    Full warfarin instructions:  2.5 mg every Tue, Fri; 3.75 mg all other days   Next INR check:  7/21/2023           ?   Allyn Doe RN  Anticoagulation Clinic  7/17/2023    _______________________________________________________________________     Anticoagulation Episode Summary     Current INR goal:  2.0-3.0   TTR:  86.6 % (1 y)   Target end date:  Indefinite   Send INR reminders to:  [facility]    Indications    Long-term (current) use of anticoagulants [Z79.01] [Z79.01]  Atrial fibrillation (H) [I48.91]  Antiphospholipid antibody syndrome (H) (Resolved) [D68.61]  Personal history of DVT (deep vein thrombosis) (Resolved) [Z86.718]  Atrial fibrillation  unspecified type (H) (Resolved) [I48.91]  Paroxysmal atrial fibrillation (H) [I48.0]  Chronic atrial fibrillation (H) [I48.20]  Personal history of PE (pulmonary embolism) [Z86.711]           Comments:  JESSICA rodas to manage by exception         Anticoagulation Care Providers     Provider Role Specialty Phone number    Anya Nowak MD Referring Family Medicine [phone]    Elizabeth Balderas MD Referring Internal Medicine [phone]

## 2023-07-17 NOTE — LETTER
July 20, 2023  Hareshroger Altamiranochloé  [address] [place] [address]    Dear Mr. [facility]     Thank you for talking with me on Jul 17, 2023 about your health and medications. As a follow-up to our conversation, I have included two documents:      1. Your Recommended To-Do List has steps you should take to get the best results from your medications.  2. Your Medication List will help you keep track of your medications and how to take them.    If you want to talk about these documents, please call Lizbeth Chen RPH at phone: [phone], Monday-Friday 8-4:30pm.    I look forward to working with you and your doctors to make sure your medications work well for you.    Sincerely,  Lizbeth Chen RPH  [facility] Pharmacist, [facility]

## 2023-07-17 NOTE — LETTER
_  Medication List        Prepared on: 07/20/2023     Bring your Medication List when you go to the doctor, hospital, or   emergency room. And, share it with your family or caregivers.     Note any changes to how you take your medications.  Cross out medications when you no longer use them.    Medication How I take it Why I use it Prescriber   amoxicillin (AMOXIL) 500 MG capsule Take 2,000 mg by mouth once Take 1 hour prior to dental procedure  Prevent infection Anya Nowak MD   carbidopa-levodopa (SINEMET)  MG tablet 1 tab 3/day at 8am, 2pm and 8pm Parkinson Disease (H) Ágnel Hill MD   flecainide (TAMBOCOR) 50 MG tablet TAKE 1 TABLET TWICE A DAY Paroxysmal Atrial Fibrillation (H); Paroxysmal Supraventricular Tachycardia (H) Bekah Matt MD   loratadine (CLARITIN REDITABS) 10 MG ODT Take 10 mg by mouth daily  General Health   Patient Reported   metoprolol tartrate (LOPRESSOR) 25 MG tablet 0.5 tablets (12.5 mg) by Oral or Feeding Tube route 2 times daily (crush tablet) Paroxysmal Atrial Fibrillation (H) Anya Nowak MD   NONFORMULARY Abbott osmolyte feedings 2 cans 3/day  General Health   Patient Reported   omeprazole (PRILOSEC) 20 MG DR capsule TAKE 1 CAPSULE BY MOUTH EVERY MORNING. 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); Status Post Bone Marrow Transplant (H); Hereditary hemochromatosis (H); Thyroid Nodule Patient Reported   rosuvastatin (CRESTOR) 40 MG tablet Take 1 tablet (40 mg) by mouth At Bedtime Acute systolic (congestive) heart failure (H) Nayana Hummel PA-C   warfarin ANTICOAGULANT (COUMADIN) 2.5 MG tablet TAKE 1 TABLET (2.5 MG) by mouth every Tue, Fri; and take ONE AND ONE-HALF TABLETS (3.75 MG) ALL OTHER DAYS OR AS DIRECTED BY ANTICOAGULATION CLINIC Antiphospholipid Antibody Syndrome (H)  Jose G Herrera MD         Add new medications, over-the-counter drugs, herbals, vitamins, or  minerals in the blank rows below.    Medication How I take it Why I use it Prescriber                                      Allergies:      seasonal allergies        Side effects I have had:               Other Information:              My notes and questions:

## 2023-07-17 NOTE — LETTER
"Recommended To-Do List      Prepared on: 07/20/2023       You can get the best results from your medications by completing the items on this \"To-Do List.\"      Bring your To-Do List when you go to your doctor. And, share it with your family or caregivers.    My To-Do List:  What we talked about: What I should do:    What my medicines are for, how to know if my medicines are working, made sure my medicines are safe for me and reviewed how to take my medicines.      Take my medicines every day                  "

## 2023-07-17 NOTE — PROGRESS NOTES
Medication Therapy Management (MTM) Encounter    ASSESSMENT:                            Medication Adherence/Access: No issues identified    Parkinson's Disease: Stable per patient.    Afib/Hypertension: Stable.     Hyperlipidemia: stable    GERD: stable     Allergic Rhinitis: stable     Vitamins/supplements: stable     PLAN:                            1. Continue current medications.    Follow-up: Return in about 1 year (around 7/17/2024) for Medication Therapy Management. And as needed with Leslie Clifford PharmD in neurology.    SUBJECTIVE/OBJECTIVE:                          Haresh Rock is a 73 year old male contacted via secure video for a follow-up visit from 1/3/22 with JOSEFINA Minor PharmD, neurology.       Reason for visit: med review.    Allergies/ADRs: Reviewed in chart  Past Medical History: Reviewed in chart  Tobacco: He reports that he has never smoked. He has never used smokeless tobacco.  Alcohol: none    Medication Adherence/Access: no issues reported  ? 8 am - swallow morning pills by mouth including flecanide (AM dose only), Sinemet (AM dose only), omeprazole, loratadine ODT and metoprolol tablets  ? 9-11 am feeding  ? 1-2 pm - swallow pill by mouth - Sinamet  ? 3-5 pm feeding  ? 7 pm evening pills - crushes warfarin, flecainide, rosuvastatin, metoprolol and Sinemet then mixes all together with 120 mL water in syringe and administers through g-tube. Also takes extra 2 extra 120 mL water by g-tube after taking pills.  ? 8-10 pm feeding    Parkinson's Disease:  Current medications include: Carbidopa-levodopa  mg 1 tablet 3 times/day. Seems to be working fine. Has follow-up with neurology this fall. Still has a little tremor in his right hand when he's sitting, but not too bothersome.  No dizziness. Has completed working with speech therapy.    Afib/Hypertension: Patient is currently taking warfarin for anticoagulation. Patient reports no current concerns of bruising or bleeding.  Patient does not have a history of GI bleed.   Patient is also taking flecainide 50 mg twice daily, metoprolol ER 12.5 mg twice daily Patient reports no current medication side effects.     BP Readings from Last 3 Encounters:   06/06/23 116/64   04/04/23 123/69   01/03/23 137/70     INR   Date Value Ref Range Status   07/16/2021 1.9 (A) 2 - 3 Final     INR Point of Care   Date Value Ref Range Status   11/19/2021 2.4 (A) 0.9 - 1.1 Final     INR HOME MONITORING   Date Value Ref Range Status   07/15/2023 2.6 2 - 3 RATIO Final     Comment:     mdINR - Inr Result From [employer] Mobile Application      Hyperlipidemia: Current therapy includes rosuvastatin 40mg daily.  Patient reports no significant myalgias or other side effects.  Recent Labs   Lab Test 10/11/22  1553 08/20/21  0752 04/18/16  1659 11/16/15  1155   CHOL 92 102   < > 126   HDL 39* 40   < > 31*   LDL 40 50   < > 71   TRIG 66 61   < > 119   CHOLHDLRATIO  --   --   --  4.1    < > = values in this interval not displayed.     GERD: Current medications include: Prilosec (omeprazole) 20 mg once daily. No issues reported today.     Allergic Rhinitis: Current medications include loratadine 10 mg ODT, no longer using Flonase, doesn't feel he needs. No issues with congestion. No issues reported today.    Vitamins/supplements:  no longer taking multivitamin or Vitamin D as this is in his g-tube formula.     Today's Vitals: There were no vitals taken for this visit.  ----------------      I spent 18 minutes with this patient today. All changes were made via collaborative practice agreement with Anya Nowak MD. A copy of the visit note was provided to the patient's provider(s).    A summary of these recommendations was sent via [employer].    Emi Chen, PharmD  Medication Therapy Management Pharmacist  [phone]    Telemedicine Visit Details  Type of service:  Video Conference via [employer]  Start Time: 3:35 pm  End Time: 3:53 PM     Medication Therapy Recommendations  No  medication therapy recommendations to display

## 2023-07-17 NOTE — PATIENT INSTRUCTIONS
"Recommendations from today's MTM visit:                                                               Follow-up: Return in about 1 year (around 7/17/2024) for Medication Therapy Management. And as needed with Leslie Clifford PharmD in neurology.    It was great speaking with you today.  I value your experience and would be very thankful for your time in providing feedback in our clinic survey. In the next few days, you may receive an email or text message from [employer] with a link to a survey related to your  clinical pharmacist.\"     To schedule another MTM appointment, please call the clinic directly or you may call the MTM scheduling line at [phone] or toll-free at [phone].     My Clinical Pharmacist's contact information:                                                      Please feel free to contact me with any questions or concerns you have.      Matheus WalkerD  Medication Therapy Management Pharmacist  [phone]     "

## 2023-07-17 NOTE — Clinical Note
JOSEFINA COELLO note, thanks!  Emi Chen, PharmD Medication Therapy Management Pharmacist [phone]

## 2023-07-21 ENCOUNTER — TRANSFERRED RECORDS (OUTPATIENT)
Dept: HEALTH INFORMATION MANAGEMENT | Facility: CLINIC | Age: 73
End: 2023-07-21
Payer: COMMERCIAL

## 2023-07-21 ENCOUNTER — DOCUMENTATION ONLY (OUTPATIENT)
Dept: ANTICOAGULATION | Facility: CLINIC | Age: 73
End: 2023-07-21
Payer: COMMERCIAL

## 2023-07-21 DIAGNOSIS — Z86.711 PERSONAL HISTORY OF PE (PULMONARY EMBOLISM): ICD-10-CM

## 2023-07-21 DIAGNOSIS — Z79.01 LONG TERM CURRENT USE OF ANTICOAGULANT THERAPY: Primary | ICD-10-CM

## 2023-07-21 DIAGNOSIS — I48.20 CHRONIC ATRIAL FIBRILLATION (H): ICD-10-CM

## 2023-07-21 DIAGNOSIS — I48.0 PAROXYSMAL ATRIAL FIBRILLATION (H): ICD-10-CM

## 2023-07-21 LAB — INR HOME MONITORING: 2.7 RATIO (ref 2–3)

## 2023-07-21 NOTE — PROGRESS NOTES
ANTICOAGULATION  MANAGEMENT-Home Monitor Managed by Exception    Haresh EMILIE Rock 73 year old male is on warfarin with therapeutic INR result. (Goal INR 2.0-3.0)    Recent labs: (last 7 days)     07/21/23  1005   INR 2.7         Previous INR was Therapeutic    Medication, diet, health changes since last INR:chart reviewed; none identified    Contacted within the last 12 weeks by phone on 6/6/23      MINNIE Hutson was NOT contacted regarding therapeutic result today per home monitoring policy manage by exception agreement.   Current warfarin dose is to be continued:     Summary  As of 7/21/2023    Full warfarin instructions:  2.5 mg every Tue, Fri; 3.75 mg all other days   Next INR check:  7/28/2023           ?   Allyn Doe RN  Anticoagulation Clinic  7/21/2023    _______________________________________________________________________     Anticoagulation Episode Summary     Current INR goal:  2.0-3.0   TTR:  86.7 % (1 y)   Target end date:  Indefinite   Send INR reminders to:  [facility]    Indications    Long-term (current) use of anticoagulants [Z79.01] [Z79.01]  Atrial fibrillation (H) [I48.91]  Antiphospholipid antibody syndrome (H) (Resolved) [D68.61]  Personal history of DVT (deep vein thrombosis) (Resolved) [Z86.718]  Atrial fibrillation  unspecified type (H) (Resolved) [I48.91]  Paroxysmal atrial fibrillation (H) [I48.0]  Chronic atrial fibrillation (H) [I48.20]  Personal history of PE (pulmonary embolism) [Z86.711]           Comments:  JESSICA rodas to manage by exception         Anticoagulation Care Providers     Provider Role Specialty Phone number    Anya Nowak MD Referring Family Medicine [phone]    Elizabeth Balderas MD Referring Internal Medicine [phone]            
No

## 2023-07-28 ENCOUNTER — DOCUMENTATION ONLY (OUTPATIENT)
Dept: ANTICOAGULATION | Facility: CLINIC | Age: 73
End: 2023-07-28
Payer: COMMERCIAL

## 2023-07-28 ENCOUNTER — TRANSFERRED RECORDS (OUTPATIENT)
Dept: HEALTH INFORMATION MANAGEMENT | Facility: CLINIC | Age: 73
End: 2023-07-28
Payer: COMMERCIAL

## 2023-07-28 DIAGNOSIS — I48.0 PAROXYSMAL ATRIAL FIBRILLATION (H): ICD-10-CM

## 2023-07-28 DIAGNOSIS — Z86.711 PERSONAL HISTORY OF PE (PULMONARY EMBOLISM): ICD-10-CM

## 2023-07-28 DIAGNOSIS — I48.20 CHRONIC ATRIAL FIBRILLATION (H): ICD-10-CM

## 2023-07-28 DIAGNOSIS — Z79.01 LONG TERM CURRENT USE OF ANTICOAGULANT THERAPY: Primary | ICD-10-CM

## 2023-07-28 LAB — INR HOME MONITORING: 2.6 RATIO (ref 2–3)

## 2023-07-28 NOTE — PROGRESS NOTES
ANTICOAGULATION  MANAGEMENT-Home Monitor Managed by Exception    Haresh EMILIE Rock 73 year old male is on warfarin with therapeutic INR result. (Goal INR 2.0-3.0)    Recent labs: (last 7 days)     07/28/23  0912   INR 2.6       Previous INR was Therapeutic  Medication, diet, health changes since last INR:chart reviewed; none identified  Contacted within the last 12 weeks by phone on 6/6/23      MINNIE Hutson was NOT contacted regarding therapeutic result today per home monitoring policy manage by exception agreement.   Current warfarin dose is to be continued:     Summary  As of 7/28/2023      Full warfarin instructions:  2.5 mg every Tue, Fri; 3.75 mg all other days   Next INR check:  8/4/2023             ?   Macrina Preston RN  Anticoagulation Clinic  7/28/2023    _______________________________________________________________________     Anticoagulation Episode Summary       Current INR goal:  2.0-3.0   TTR:  86.7 % (1 y)   Target end date:  Indefinite   Send INR reminders to:  [facility]    Indications    Long-term (current) use of anticoagulants [Z79.01] [Z79.01]  Atrial fibrillation (H) [I48.91]  Antiphospholipid antibody syndrome (H) (Resolved) [D68.61]  Personal history of DVT (deep vein thrombosis) (Resolved) [Z86.718]  Atrial fibrillation  unspecified type (H) (Resolved) [I48.91]  Paroxysmal atrial fibrillation (H) [I48.0]  Chronic atrial fibrillation (H) [I48.20]  Personal history of PE (pulmonary embolism) [Z86.711]             Comments:  JESSICA rodas to manage by exception             Anticoagulation Care Providers       Provider Role Specialty Phone number    Anya Nowak MD Referring Family Medicine [phone]    Elizabeth Balderas MD Referring Internal Medicine [phone]

## 2023-08-01 ENCOUNTER — PATIENT OUTREACH (OUTPATIENT)
Dept: CARE COORDINATION | Facility: CLINIC | Age: 73
End: 2023-08-01
Payer: COMMERCIAL

## 2023-08-01 NOTE — PROGRESS NOTES
Clinic Care Coordination Contact  Care Coordination Clinician Chart Review    Situation: Patient chart reviewed by Care Coordinator.       Background: Care Coordination Program started: 11/5/2021. Initial assessment completed and patient-centered care plan(s) were developed with participation from patient. Lead CC handed patient off to CHW for continued outreaches.       Assessment: Per chart review, patient outreach completed by CC CHW on 6/28/23.  Patient is actively working to accomplish goal(s). Patient's goal(s) appropriate and relevant at this time. Patient is not due for updated Plan of Care.  Assessments will be completed annually or as needed/with change of patient status.      Care Plan: General  Work on increasing strength and stamina for a year after reaching 100%       Problem: HP GENERAL PROBLEM       Goal: General  work on walking without the walker, and increasing my strength and stamina.  For at least 1 year after reaching 100%.       Start Date: 12/13/2021 Expected End Date: 6/13/2024    This Visit's Progress: 90% Recent Progress: 80%    Note:     Barriers: suffers from parkinson's  Strengths: engaged in care coordination  Patient expressed understanding of goal: yes  Action steps to achieve this goal:  1. I will continue walking without the walker. No longer using the walker as of 3/15/22. Completed action step  2. I will go outside without the walker when I feel strong enough and have increased my strength and stamina.  Continue to work on for at least a year.  3. I will frequently walk around inside the house to increase my strength and stamina.  Continue to work on for at least a year.                            Care Plan: General - Working on balance and items in the path,       Problem: HP GENERAL PROBLEM       Goal: General Goal - work on balance and avoiding items in the path       Start Date: 10/28/2022 Expected End Date: 10/28/2023    This Visit's Progress: 70% Recent Progress: 60%     Note:     Barriers: parkinson's  Strengths: engaged in care coordination  Patient expressed understanding of goal: yes   Action steps to achieve this goal:  1. I will work on walking around items in my path without losing my balance.  2. I will work on maintaining my balance when I walk through the house.  3. I will use my walker or cane as needed to maintain my balance.                                   Plan/Recommendations: The patient will continue working with Care Coordination to achieve goal(s) as above. CHW will continue outreaches at minimum every 30 days and will involve Lead CC as needed or if patient is ready to move to Maintenance. Lead CC will continue to monitor CHW outreaches and patient's progress to goal(s) every 6 weeks.     Plan of Care updated and sent to patient: Cary Zhou MSN, RN, PHN, CCM   Primary Care Clinical RN Care Coordinator  [facility]  8/1/2023   7:45 AM  [email or web address]  Office: [phone]

## 2023-08-03 ENCOUNTER — PATIENT OUTREACH (OUTPATIENT)
Dept: CARE COORDINATION | Facility: CLINIC | Age: 73
End: 2023-08-03
Payer: COMMERCIAL

## 2023-08-03 NOTE — PROGRESS NOTES
[facility]/Voicemail       Clinical Data: Care Coordinator Outreach  Outreach attempted x 1.  Left message on patient's voicemail with call back information and requested return call.  Plan:   Care Coordinator will try to reach patient again in 3-5 business days.    Next outreach due: 8/8/23

## 2023-08-04 ENCOUNTER — TRANSFERRED RECORDS (OUTPATIENT)
Dept: HEALTH INFORMATION MANAGEMENT | Facility: CLINIC | Age: 73
End: 2023-08-04

## 2023-08-04 ENCOUNTER — DOCUMENTATION ONLY (OUTPATIENT)
Dept: ANTICOAGULATION | Facility: CLINIC | Age: 73
End: 2023-08-04

## 2023-08-04 DIAGNOSIS — I48.0 PAROXYSMAL ATRIAL FIBRILLATION (H): ICD-10-CM

## 2023-08-04 DIAGNOSIS — I48.20 CHRONIC ATRIAL FIBRILLATION (H): ICD-10-CM

## 2023-08-04 DIAGNOSIS — Z86.711 PERSONAL HISTORY OF PE (PULMONARY EMBOLISM): ICD-10-CM

## 2023-08-04 DIAGNOSIS — Z79.01 LONG TERM CURRENT USE OF ANTICOAGULANT THERAPY: Primary | ICD-10-CM

## 2023-08-04 LAB
INR (EXTERNAL): 2.6 (ref 0.9–1.1)
INR HOME MONITORING: 2.6 RATIO (ref 2–3)

## 2023-08-04 NOTE — PROGRESS NOTES
ANTICOAGULATION  MANAGEMENT-Home Monitor Managed by Exception    Haresh EMILIE Rock 73 year old male is on warfarin with therapeutic INR result. (Goal INR 2.0-3.0)    Recent labs: (last 7 days)     08/04/23  1638   INR 2.6*       Previous INR was Therapeutic  Medication, diet, health changes since last INR:chart reviewed; none identified  Contacted within the last 12 weeks by phone on 6/6/23      MINNIE Hutson was NOT contacted regarding therapeutic result today per home monitoring policy manage by exception agreement.   Current warfarin dose is to be continued:     Summary  As of 8/4/2023      Full warfarin instructions:  2.5 mg every Tue, Fri; 3.75 mg all other days   Next INR check:  8/11/2023             ?   Macrina Preston RN  Anticoagulation Clinic  8/4/2023    _______________________________________________________________________     Anticoagulation Episode Summary       Current INR goal:  2.0-3.0   TTR:  86.7 % (1 y)   Target end date:  Indefinite   Send INR reminders to:  [facility]    Indications    Long-term (current) use of anticoagulants [Z79.01] [Z79.01]  Atrial fibrillation (H) [I48.91]  Antiphospholipid antibody syndrome (H) (Resolved) [D68.61]  Personal history of DVT (deep vein thrombosis) (Resolved) [Z86.718]  Atrial fibrillation  unspecified type (H) (Resolved) [I48.91]  Paroxysmal atrial fibrillation (H) [I48.0]  Chronic atrial fibrillation (H) [I48.20]  Personal history of PE (pulmonary embolism) [Z86.711]             Comments:  JESSICA rodas to manage by exception             Anticoagulation Care Providers       Provider Role Specialty Phone number    Anya Nowak MD Referring Family Medicine [phone]    Elizabeth Balderas MD Referring Internal Medicine [phone]

## 2023-08-07 ENCOUNTER — TRANSFERRED RECORDS (OUTPATIENT)
Dept: HEALTH INFORMATION MANAGEMENT | Facility: CLINIC | Age: 73
End: 2023-08-07
Payer: COMMERCIAL

## 2023-08-07 LAB — INR HOME MONITORING: 2.6 RATIO (ref 2–3)

## 2023-08-11 ENCOUNTER — PATIENT OUTREACH (OUTPATIENT)
Dept: CARE COORDINATION | Facility: CLINIC | Age: 73
End: 2023-08-11
Payer: COMMERCIAL

## 2023-08-14 NOTE — PROGRESS NOTES
Clinic Care Coordination Contact  Community Health Worker Follow Up    Care Gaps:     Health Maintenance Due   Topic Date Due    HF ACTION PLAN  Never done    HEPATITIS A IMMUNIZATION (1 of 2 - Risk 2-dose series) Never done    ZOSTER IMMUNIZATION (1 of 2) 11/14/2016    EYE EXAM  01/01/2022    DTAP/TDAP/TD IMMUNIZATION (2 - Td or Tdap) 09/11/2022    A1C  08/25/2023    CBC  09/06/2023       Patient will address at his next PCP visit.     Care Plan:   Care Plan: General  Work on increasing strength and stamina for a year after reaching 100%       Problem: HP GENERAL PROBLEM       Goal: General  work on walking without the walker, and increasing my strength and stamina.  For at least 1 year after reaching 100%.       Start Date: 12/13/2021 Expected End Date: 6/13/2024    This Visit's Progress: On track Recent Progress: 90%    Note:     Barriers: suffers from parkinson's  Strengths: engaged in care coordination  Patient expressed understanding of goal: yes  Action steps to achieve this goal:  1. I will continue walking without the walker. No longer using the walker as of 3/15/22. Completed action step  2. I will go outside without the walker when I feel strong enough and have increased my strength and stamina.  Continue to work on for at least a year.  3. I will frequently walk around inside the house to increase my strength and stamina.  Continue to work on for at least a year.                            Care Plan: General - Working on balance and items in the path,       Problem: HP GENERAL PROBLEM       Goal: General Goal - work on balance and avoiding items in the path       Start Date: 10/28/2022 Expected End Date: 10/28/2023    This Visit's Progress: 80% Recent Progress: 70%    Note:     Barriers: parkinson's  Strengths: engaged in care coordination  Patient expressed understanding of goal: yes   Action steps to achieve this goal:  1. I will work on walking around items in my path without losing my balance.  2. I  will work on maintaining my balance when I walk through the house.  3. I will use my walker or cane as needed to maintain my balance.                                Intervention and Education during outreach: Alexey continues to walk independently without his walker.     Patient has been dealing with an issue with his INR machine. He has been working with the company that he got it through and they are going to get it replaced for him. If he has any issues and needs CCC assistance he will contact the team.     CHW Plan: CHW will continue to support patient with goals through routine scheduled outreach.     Next outreach due:  9/12/23

## 2023-08-16 ENCOUNTER — TRANSFERRED RECORDS (OUTPATIENT)
Dept: HEALTH INFORMATION MANAGEMENT | Facility: CLINIC | Age: 73
End: 2023-08-16
Payer: COMMERCIAL

## 2023-08-16 ENCOUNTER — DOCUMENTATION ONLY (OUTPATIENT)
Dept: ANTICOAGULATION | Facility: CLINIC | Age: 73
End: 2023-08-16
Payer: COMMERCIAL

## 2023-08-16 DIAGNOSIS — I48.0 PAROXYSMAL ATRIAL FIBRILLATION (H): ICD-10-CM

## 2023-08-16 DIAGNOSIS — I48.20 CHRONIC ATRIAL FIBRILLATION (H): ICD-10-CM

## 2023-08-16 DIAGNOSIS — Z86.711 PERSONAL HISTORY OF PE (PULMONARY EMBOLISM): ICD-10-CM

## 2023-08-16 DIAGNOSIS — Z79.01 LONG TERM CURRENT USE OF ANTICOAGULANT THERAPY: Primary | ICD-10-CM

## 2023-08-16 LAB — INR HOME MONITORING: 2.9 RATIO (ref 2–3)

## 2023-08-16 NOTE — PROGRESS NOTES
ANTICOAGULATION  MANAGEMENT-Home Monitor Managed by Exception    Haresh EMILIE Rock 73 year old male is on warfarin with therapeutic INR result. (Goal INR 2.0-3.0)    Recent labs: (last 7 days)     08/16/23  1559   INR 2.9       Previous INR was Therapeutic  Medication, diet, health changes since last INR:chart reviewed; none identified  Contacted within the last 12 weeks by phone on 6/6/23      MINNIE Hutson was NOT contacted regarding therapeutic result today per home monitoring policy manage by exception agreement.   Current warfarin dose is to be continued:     Summary  As of 8/16/2023      Full warfarin instructions:  2.5 mg every Tue, Fri; 3.75 mg all other days   Next INR check:  8/23/2023             ?   Allyn Doe RN  Anticoagulation Clinic  8/16/2023    _______________________________________________________________________     Anticoagulation Episode Summary       Current INR goal:  2.0-3.0   TTR:  86.7 % (1 y)   Target end date:  Indefinite   Send INR reminders to:  [facility]    Indications    Long-term (current) use of anticoagulants [Z79.01] [Z79.01]  Atrial fibrillation (H) [I48.91]  Antiphospholipid antibody syndrome (H) (Resolved) [D68.61]  Personal history of DVT (deep vein thrombosis) (Resolved) [Z86.718]  Atrial fibrillation  unspecified type (H) (Resolved) [I48.91]  Paroxysmal atrial fibrillation (H) [I48.0]  Chronic atrial fibrillation (H) [I48.20]  Personal history of PE (pulmonary embolism) [Z86.711]             Comments:  JESSICA rodas to manage by exception             Anticoagulation Care Providers       Provider Role Specialty Phone number    Anya Nowak MD Referring Family Medicine [phone]    Elizabeth Balderas MD Referring Internal Medicine [phone]

## 2023-08-25 ENCOUNTER — DOCUMENTATION ONLY (OUTPATIENT)
Dept: ANTICOAGULATION | Facility: CLINIC | Age: 73
End: 2023-08-25
Payer: COMMERCIAL

## 2023-08-25 ENCOUNTER — TRANSFERRED RECORDS (OUTPATIENT)
Dept: HEALTH INFORMATION MANAGEMENT | Facility: CLINIC | Age: 73
End: 2023-08-25
Payer: COMMERCIAL

## 2023-08-25 DIAGNOSIS — I48.0 PAROXYSMAL ATRIAL FIBRILLATION (H): ICD-10-CM

## 2023-08-25 DIAGNOSIS — Z79.01 LONG TERM CURRENT USE OF ANTICOAGULANT THERAPY: Primary | ICD-10-CM

## 2023-08-25 DIAGNOSIS — Z86.711 PERSONAL HISTORY OF PE (PULMONARY EMBOLISM): ICD-10-CM

## 2023-08-25 DIAGNOSIS — I48.20 CHRONIC ATRIAL FIBRILLATION (H): ICD-10-CM

## 2023-08-25 LAB — INR HOME MONITORING: 2.8 RATIO (ref 2–3)

## 2023-08-25 NOTE — PROGRESS NOTES
ANTICOAGULATION  MANAGEMENT-Home Monitor Managed by Exception    Haresh EMILIE Rock 73 year old male is on warfarin with therapeutic INR result. (Goal INR 2.0-3.0)    Recent labs: (last 7 days)     08/25/23  1450   INR 2.8       Previous INR was Therapeutic  Medication, diet, health changes since last INR:chart reviewed; none identified  Contacted within the last 12 weeks by phone on 6/6/23       MINNIE Hutson was NOT contacted regarding therapeutic result today per home monitoring policy manage by exception agreement.   Current warfarin dose is to be continued:     Summary  As of 8/25/2023      Full warfarin instructions:  2.5 mg every Tue, Fri; 3.75 mg all other days   Next INR check:  9/8/2023             ?   Blanca Rodriguez RN  Anticoagulation Clinic  8/25/2023    _______________________________________________________________________     Anticoagulation Episode Summary       Current INR goal:  2.0-3.0   TTR:  86.9 % (1 y)   Target end date:  Indefinite   Send INR reminders to:  [facility]    Indications    Long-term (current) use of anticoagulants [Z79.01] [Z79.01]  Atrial fibrillation (H) [I48.91]  Antiphospholipid antibody syndrome (H) (Resolved) [D68.61]  Personal history of DVT (deep vein thrombosis) (Resolved) [Z86.718]  Atrial fibrillation  unspecified type (H) (Resolved) [I48.91]  Paroxysmal atrial fibrillation (H) [I48.0]  Chronic atrial fibrillation (H) [I48.20]  Personal history of PE (pulmonary embolism) [Z86.711]             Comments:  JESSICA rodas to manage by exception             Anticoagulation Care Providers       Provider Role Specialty Phone number    Anya Nowak MD Referring Family Medicine [phone]    Elizabeth Balderas MD Referring Internal Medicine [phone]

## 2023-08-26 ENCOUNTER — HEALTH MAINTENANCE LETTER (OUTPATIENT)
Age: 73
End: 2023-08-26

## 2023-09-01 ENCOUNTER — PATIENT OUTREACH (OUTPATIENT)
Dept: SURGERY | Facility: CLINIC | Age: 73
End: 2023-09-01
Payer: COMMERCIAL

## 2023-09-01 ENCOUNTER — DOCUMENTATION ONLY (OUTPATIENT)
Dept: ANTICOAGULATION | Facility: CLINIC | Age: 73
End: 2023-09-01
Payer: COMMERCIAL

## 2023-09-01 ENCOUNTER — TRANSFERRED RECORDS (OUTPATIENT)
Dept: HEALTH INFORMATION MANAGEMENT | Facility: CLINIC | Age: 73
End: 2023-09-01
Payer: COMMERCIAL

## 2023-09-01 DIAGNOSIS — I48.0 PAROXYSMAL ATRIAL FIBRILLATION (H): ICD-10-CM

## 2023-09-01 DIAGNOSIS — Z86.711 PERSONAL HISTORY OF PE (PULMONARY EMBOLISM): ICD-10-CM

## 2023-09-01 DIAGNOSIS — Z79.01 LONG TERM CURRENT USE OF ANTICOAGULANT THERAPY: Primary | ICD-10-CM

## 2023-09-01 DIAGNOSIS — I48.20 CHRONIC ATRIAL FIBRILLATION (H): ICD-10-CM

## 2023-09-01 LAB
INR (EXTERNAL): 2.6 (ref 0.9–1.1)
INR HOME MONITORING: 2.6 RATIO (ref 2–3)

## 2023-09-01 NOTE — PROGRESS NOTES
ANTICOAGULATION  MANAGEMENT-Home Monitor Managed by Exception    Haresh Rock 73 year old male is on warfarin with therapeutic INR result. (Goal INR 2.0-3.0)    Recent labs: (last 7 days)     09/01/23  0000   INR 2.6*       Previous INR was Therapeutic  Medication, diet, health changes since last INR:chart reviewed; none identified  Contacted within the last 12 weeks by phone on 6/6/23 - will need call with next INR      PLAN     Haresh was NOT contacted regarding therapeutic result today per home monitoring policy manage by exception agreement.   Current warfarin dose is to be continued:     Summary  As of 9/1/2023      Full warfarin instructions:  2.5 mg every Tue, Fri; 3.75 mg all other days   Next INR check:  9/8/2023             ?   Sofiya Alegria RN  Anticoagulation Clinic  9/1/2023    _______________________________________________________________________     Anticoagulation Episode Summary       Current INR goal:  2.0-3.0   TTR:  88.3 % (1 y)   Target end date:  Indefinite   Send INR reminders to:  [facility]    Indications    Long-term (current) use of anticoagulants [Z79.01] [Z79.01]  Atrial fibrillation (H) [I48.91]  Antiphospholipid antibody syndrome (H) (Resolved) [D68.61]  Personal history of DVT (deep vein thrombosis) (Resolved) [Z86.718]  Atrial fibrillation  unspecified type (H) (Resolved) [I48.91]  Paroxysmal atrial fibrillation (H) [I48.0]  Chronic atrial fibrillation (H) [I48.20]  Personal history of PE (pulmonary embolism) [Z86.711]             Comments:  JESSICA rodas to manage by exception             Anticoagulation Care Providers       Provider Role Specialty Phone number    Anya Nowak MD Referring Family Medicine [phone]    Elizabeth Balderas MD Referring Internal Medicine [phone]

## 2023-09-01 NOTE — PROGRESS NOTES
Patient Telephone Reminder Call    Date of call:  09/01/23  Phone numbers:  Cell number on file:    Telephone Information:   Mobile [phone]       Reached patient/confirmed appointment:  Yes  Appointment with:   Dr. Reg Cantu  Reason for visit:  g-tube replacement

## 2023-09-04 ASSESSMENT — ENCOUNTER SYMPTOMS
TROUBLE SWALLOWING: 1
HOARSE VOICE: 1
SINUS CONGESTION: 0
SINUS PAIN: 0

## 2023-09-05 ENCOUNTER — OFFICE VISIT (OUTPATIENT)
Dept: SURGERY | Facility: CLINIC | Age: 73
End: 2023-09-05
Payer: COMMERCIAL

## 2023-09-05 VITALS
BODY MASS INDEX: 24.16 KG/M2 | SYSTOLIC BLOOD PRESSURE: 116 MMHG | HEART RATE: 65 BPM | OXYGEN SATURATION: 99 % | WEIGHT: 172.6 LBS | HEIGHT: 71 IN | DIASTOLIC BLOOD PRESSURE: 66 MMHG

## 2023-09-05 DIAGNOSIS — Z43.1 ATTENTION TO G-TUBE (H): Primary | ICD-10-CM

## 2023-09-05 PROCEDURE — 99214 OFFICE O/P EST MOD 30 MIN: CPT | Mod: 25 | Performed by: SURGERY

## 2023-09-05 PROCEDURE — 43762 RPLC GTUBE NO REVJ TRC: CPT | Performed by: SURGERY

## 2023-09-05 ASSESSMENT — PAIN SCALES - GENERAL: PAINLEVEL: NO PAIN (0)

## 2023-09-05 NOTE — PROCEDURES
PROCEDURE:  Haresh Rock was comfortable in supine position.  The previous  20-Algerian 2 cm Jerrell-Key tube  was removed after emptying the balloon  and a 20-Algerian Jerrell-Key tube was advanced into the same location with some initial difficulty.  Gastric contents returned and the balloon was filled to 5 cm.  The patient tolerated the change out to the Jerrell-Key tube with a 2 cm length and a 20 cm Algerian diameter.  The placement was with some difficulty.  A small amount of granulation tissue was treated with silver nitrate sticks and the patient tolerated this as well.  The patient's abdomen is soft and nontender.  The patient is in no apparent distress.

## 2023-09-05 NOTE — LETTER
9/5/2023       RE: Haresh Rock  [address] [place] [address]       Dear Colleague,    Thank you for referring your patient, Haresh Rock, to the [facility] GENERAL SURGERY CLINIC [place] at [facility]. Please see a copy of my visit note below.    General Surgery Clinic:     The patient, Mr. Haresh Rock, is well known to the Surgery Clinic.  The patient presents for change out to a Jerrell-Key tube for his G-tube.  The patient has had multiple aspiration demonstrations using the swallowing study.  The patient is currently receiving his nutrition through a G-tube.  The G-tube had to be replaced by Surgery as there was an intervening loop of small intestine during the first placement of the G-tube that was done by Interventional Radiology.  At this point, the patient is doing very well with home feedings.  He is using a regular G-tube.  He would like to have available the Jerrell-Key, flat, low profile G-tube.  So today we are replacing his 20-Nepalese  Jerrell-Key tube  with a 20-Nepalese 2 cm Jerrell-Key tube.      PAST MEDICAL HISTORY:  Past Medical History:   Diagnosis Date    Abnormal dopamine scan (DaTSCAN) 2020 11/04/2020    [phone] 11/3/2020   Narrative & Impression   Examination: Nuclear medicine DATscan for Dopamine Receptor Localization.   Examination: NM Brain Image Tomographic (SPECT) DATscan   Date: 11/3/2020 3:21 PM   Indication: Parkinsonism   Comparison: None   Additional Information: none   Interfering Medications: None   Technique:   The patient initially received 1 ml of Lugol's solution orally prior    Antiphospholipid antibody syndrome (H)     Ravi's, noted negative per testing re-done in 2008 in hematology note 01/13/2009    Articulation disorder 09/23/2020    Atrial fibrillation (H) 04/2011    Benign prostatic hyperplasia with urinary retention     Cirrhosis (H)     CKD (chronic kidney disease) stage 2, GFR 60-89 ml/min     Diabetes mellitus  type II     steroid induced    DJD (degenerative joint disease) of knee     SHAWN, worse on right    DVT (deep venous thrombosis) (H)     ED (erectile dysfunction)     Gallbladder polyp 05/2010    Dfwxf-Npgmxv-Fkly Disease 2007    BMT    Hemochromatosis     Hodgkin's disease NOS     5 cycles of ABVD in 2011    HTN (hypertension)     Hyperlipidaemia LDL goal <100     Multiple thyroid nodules     benign     Myeloproliferative disorder (H)     atypical, [facility]    Parkinson disease (H) 09/24/2020    PE (pulmonary embolism) 11/2004    Polyneuropathy     Pressure ulcer     Primary pulmonary hypertension (H)     Severe protein-calorie malnutrition (H) 11/10/2021    Small bowel obstruction (H) 10/29/2021    Thrombocytopenia (H) 06/08/2021        PAST SURGICAL HISTORY:  Past Surgical History:   Procedure Laterality Date    ANESTHESIA CARDIOVERSION  04/21/2011    Procedure:ANESTHESIA CARDIOVERSION; Surgeon:GENERIC ANESTHESIA PROVIDER; Location:[facility] OR    ARTHROSCOPY KNEE RT/LT      LT    CATARACT IOL, RT/LT  2017    COLONOSCOPY  10/16/2012    Procedure: COLONOSCOPY;  Colonoscopy, screening;  Surgeon: Reg Feliciano MD;  Location: [facility] OR    COLONOSCOPY N/A 04/14/2021    Procedure: COLONOSCOPY;  Surgeon: Reg Holm MD;  Location: [facility] GI    ESOPHAGOSCOPY, GASTROSCOPY, DUODENOSCOPY (EGD), COMBINED N/A 10/07/2020    Procedure: ESOPHAGOGASTRODUODENOSCOPY, WITH BIOPSY;  Surgeon: Raul Sawyer DO;  [place] [facility] [place]    H ABLATION FOCAL AFIB  03/05/2013    PVI    H ABLATION FOCAL AFIB  01/01/2018    HC BONE MARROW/STEM TRANSPLANT ALLOGENIC  05/08/2007    INSERT PORT VASCULAR ACCESS      IR GASTROSTOMY TUBE PERCUTANEOUS PLCMNT  10/25/2021    JOINT REPLACEMTN, KNEE RT/LT  03/28/2012    SHAWN    LAPAROSCOPY DIAGNOSTIC (GENERAL) N/A 10/29/2021    Procedure: LAPAROSCOPY, DIAGNOSTIC, TAKEDOWN OF MALPOSITIONED GASTROSTOMY TUBE, INSERTION OF GASTROSTOMY TUBE, SMALL BOWEL RESECTION X1, PRIMARY REPAIR OF SMALL BOWEL ENTEROTOMY,  ABDOMINAL WASHOUT, TAP BLOCK;  Surgeon: Leif Finney DO;  Location: [facility] OR    PEG TUBE PLACEMENT  10/25/2021    VASECTOMY  1990        MEDICATIONS:  Current Outpatient Medications   Medication    amoxicillin (AMOXIL) 500 MG capsule    carbidopa-levodopa (SINEMET)  MG tablet    flecainide (TAMBOCOR) 50 MG tablet    loratadine (CLARITIN REDITABS) 10 MG ODT    metoprolol tartrate (LOPRESSOR) 25 MG tablet    NONFORMULARY    omeprazole (PRILOSEC) 20 MG DR capsule    PREVIDENT 5000 DRY MOUTH 1.1 % GEL topical gel    rosuvastatin (CRESTOR) 40 MG tablet    warfarin ANTICOAGULANT (COUMADIN) 2.5 MG tablet     No current facility-administered medications for this visit.        ALLERGIES:  Allergies   Allergen Reactions    Seasonal Allergies         SOCIAL HISTORY:  Social History     Socioeconomic History    Marital status:      Spouse name: Angelica    Number of children: 2    Years of education: 21    Highest education level: None   Occupational History    Occupation:      Employer: [employer]     Employer: [employer]   Tobacco Use    Smoking status: Never    Smokeless tobacco: Never   Substance and Sexual Activity    Alcohol use: No    Drug use: No    Sexual activity: Not Currently     Partners: Female     Birth control/protection: None   Other Topics Concern    Parent/sibling w/ CABG, MI or angioplasty before 65F 55M? No   Social History Narrative     about 48 yrs        Wife has some health issues - back pain - second knee replaced    She had gastric bypass and a fib and bad mitral valve        Son in [place]    Daughter in [facility] with 10 yo son.         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[employer]        Born and raised in [place]                FAMILY HISTORY:  Family History   Problem Relation Age of Onset    Hypertension Mother     Cerebrovascular Disease Mother     Breast Cancer Mother     Alzheimer Disease Father     Arrhythmia Sister          "supraventricular tachycardia    Thyroid Disease Sister     Pneumonia Sister         covid19    Other - See Comments Sister         lives in [place]    Arrhythmia Brother     Prostate Cancer Brother     Other - See Comments Brother         [place]    Cancer Brother         lymphome or bone cancer    Gastrointestinal Disease Maternal Grandmother         colitis     Thyroid Cancer Daughter         had thyroid removed    Bipolar Disorder Daughter     Other - See Comments Daughter         lives in [place] - single with one son 9  yrs    Thyroid Disease Daughter     Other - See Comments Son         lives in [place]. no kids and not .    Obsessive Compulsive Disorder Son     Depression Son     Eating Disorder Son         eats when depressed    Obesity Son     Diabetes Paternal Aunt     Parkinsonism Other         2 cousins with parkinson    C.A.D. No family hx of      PHYSICAL EXAM:  Vital Signs: /66 (BP Location: Left arm, Patient Position: Sitting, Cuff Size: Adult Regular)   Pulse 65   Ht 1.803 m (5' 11\")   Wt 78.3 kg (172 lb 9.6 oz)   SpO2 99%   BMI 24.07 kg/m    GEN: the patient appears in N.A. D. In the surgery clinic.  ABD: soft and non-tender, no masses, no hernias, the G-tube site has a small amount of granulation tissue; this was treated with siver nitrate sticks.  EXT: warm and well perfused.     PROCEDURE:  Haresh Rock was comfortable in supine position.  The previous  20-Kazakh 2 cm Jerrell-Key tube  was removed after emptying the balloon  and a 20-Kazakh Jerrell-Key tube was advanced into the same location with some initial difficulty.  Gastric contents returned and the balloon was filled to 5 cm.  The patient tolerated the change out to the Jerrell-Key tube with a 2 cm length and a 20 cm Kazakh diameter.  The placement was with some difficulty.  A small amount of granulation tissue was treated with silver nitrate sticks and the patient tolerated this as well.  The patient's abdomen is " soft and nontender.  The patient is in no apparent distress.     IMPRESSION:  Mr. Haresh Rock, a 72-year-old patient with Parkinson's disease, who had failed multiple swallowing studies, now has a Jerrell-Key tube.  The patient will care for the tube in the standard way.  At this point, these tubes should probably be replaced every 6 months.  The patient will call or return to arrange for a replacement.  The patient will also call or return should the Jerrell-Key tube develop issues.        >>>>>>>>>>>>>>>>>>>>>>>>>>>>>>>>>    Answers submitted by the patient for this visit:  Symptoms you have experienced in the last 30 days (Submitted on 9/4/2023)  General Symptoms: No  Skin Symptoms: No  HENT Symptoms: Yes  EYE SYMPTOMS: No  HEART SYMPTOMS: No  LUNG SYMPTOMS: No  INTESTINAL SYMPTOMS: No  URINARY SYMPTOMS: No  REPRODUCTIVE SYMPTOMS: No  SKELETAL SYMPTOMS: No  BLOOD SYMPTOMS: No  NERVOUS SYSTEM SYMPTOMS: No  MENTAL HEALTH SYMPTOMS: No  Please answer the questions below to tell us what conditions you are experiencing: (Submitted on 9/4/2023)  Ear pain: No  Ear discharge: No  Hearing loss: No  Tinnitus: No  Nosebleeds: No  Congestion: No  Sinus pain: No  Trouble swallowing: Yes   Voice hoarseness: Yes              Again, thank you for allowing me to participate in the care of your patient.      Sincerely,    Reg Cantu MD

## 2023-09-05 NOTE — NURSING NOTE
"Chief Complaint   Patient presents with    RECHECK     Replace G-tube       Vitals:    09/05/23 0853   BP: 116/66   BP Location: Left arm   Patient Position: Sitting   Cuff Size: Adult Regular   Pulse: 65   SpO2: 99%   Weight: 78.3 kg (172 lb 9.6 oz)   Height: 1.803 m (5' 11\")       Body mass index is 24.07 kg/m .                          Devendra Zepeda, EMT    "

## 2023-09-05 NOTE — PATIENT INSTRUCTIONS
You met with Dr. Reg Cantu.      Today's visit instructions:    Return to the Surgery Clinic in approximately 6 months for another g-tube exchange. We will call you in January to schedule this appointment for 2024.        If you have questions please contact Annabelle RN or Keya RN during regular clinic hours, Monday through Friday 7:30 AM - 4:00 PM, or you can contact us via [employer] at anytime.       If you have urgent needs after-hours, weekends, or holidays please call the hospital at [phone] and ask to speak with our on-call General Surgery Team.    Appointment [phone]  Nurse Advice (Annabelle or Keya): [phone]   Surgery Scheduler (Amber): [phone]  Fax: [phone]

## 2023-09-05 NOTE — PROGRESS NOTES
General Surgery Clinic:     The patient, Mr. Haresh Rock, is well known to the Surgery Clinic.  The patient presents for change out to a Jerrell-Key tube for his G-tube.  The patient has had multiple aspiration demonstrations using the swallowing study.  The patient is currently receiving his nutrition through a G-tube.  The G-tube had to be replaced by Surgery as there was an intervening loop of small intestine during the first placement of the G-tube that was done by Interventional Radiology.  At this point, the patient is doing very well with home feedings.  He is using a regular G-tube.  He would like to have available the Jerrell-Key, flat, low profile G-tube.  So today we are replacing his 20-Mauritanian  Jerrell-Key tube  with a 20-Mauritanian 2 cm Jerrell-Key tube.      PAST MEDICAL HISTORY:  Past Medical History:   Diagnosis Date    Abnormal dopamine scan (DaTSCAN) 2020 11/04/2020    [phone] 11/3/2020   Narrative & Impression   Examination: Nuclear medicine DATscan for Dopamine Receptor Localization.   Examination: NM Brain Image Tomographic (SPECT) DATscan   Date: 11/3/2020 3:21 PM   Indication: Parkinsonism   Comparison: None   Additional Information: none   Interfering Medications: None   Technique:   The patient initially received 1 ml of Lugol's solution orally prior    Antiphospholipid antibody syndrome (H)     Ravi's, noted negative per testing re-done in 2008 in hematology note 01/13/2009    Articulation disorder 09/23/2020    Atrial fibrillation (H) 04/2011    Benign prostatic hyperplasia with urinary retention     Cirrhosis (H)     CKD (chronic kidney disease) stage 2, GFR 60-89 ml/min     Diabetes mellitus type II     steroid induced    DJD (degenerative joint disease) of knee     SHAWN, worse on right    DVT (deep venous thrombosis) (H)     ED (erectile dysfunction)     Gallbladder polyp 05/2010    Qjedd-Allcnh-Pxlo Disease 2007    BMT    Hemochromatosis     Hodgkin's disease NOS     5 cycles of ABVD in 2011    HTN  (hypertension)     Hyperlipidaemia LDL goal <100     Multiple thyroid nodules     benign     Myeloproliferative disorder (H)     atypical, [facility]    Parkinson disease (H) 09/24/2020    PE (pulmonary embolism) 11/2004    Polyneuropathy     Pressure ulcer     Primary pulmonary hypertension (H)     Severe protein-calorie malnutrition (H) 11/10/2021    Small bowel obstruction (H) 10/29/2021    Thrombocytopenia (H) 06/08/2021        PAST SURGICAL HISTORY:  Past Surgical History:   Procedure Laterality Date    ANESTHESIA CARDIOVERSION  04/21/2011    Procedure:ANESTHESIA CARDIOVERSION; Surgeon:GENERIC ANESTHESIA PROVIDER; Location:[facility] OR    ARTHROSCOPY KNEE RT/LT      LT    CATARACT IOL, RT/LT  2017    COLONOSCOPY  10/16/2012    Procedure: COLONOSCOPY;  Colonoscopy, screening;  Surgeon: Reg Feliciano MD;  Location: [facility] OR    COLONOSCOPY N/A 04/14/2021    Procedure: COLONOSCOPY;  Surgeon: Reg Holm MD;  Location: [facility] GI    ESOPHAGOSCOPY, GASTROSCOPY, DUODENOSCOPY (EGD), COMBINED N/A 10/07/2020    Procedure: ESOPHAGOGASTRODUODENOSCOPY, WITH BIOPSY;  Surgeon: Raul Sawyer DO;  Location: [facility] OR    H ABLATION FOCAL AFIB  03/05/2013    PVI    H ABLATION FOCAL AFIB  01/01/2018    HC BONE MARROW/STEM TRANSPLANT ALLOGENIC  05/08/2007    INSERT PORT VASCULAR ACCESS      IR GASTROSTOMY TUBE PERCUTANEOUS PLCMNT  10/25/2021    JOINT REPLACEMTN, KNEE RT/LT  03/28/2012    SHAWN    LAPAROSCOPY DIAGNOSTIC (GENERAL) N/A 10/29/2021    Procedure: LAPAROSCOPY, DIAGNOSTIC, TAKEDOWN OF MALPOSITIONED GASTROSTOMY TUBE, INSERTION OF GASTROSTOMY TUBE, SMALL BOWEL RESECTION X1, PRIMARY REPAIR OF SMALL BOWEL ENTEROTOMY, ABDOMINAL WASHOUT, TAP BLOCK;  Surgeon: Leif Finney DO;  Location: [facility] OR    PEG TUBE PLACEMENT  10/25/2021    VASECTOMY  1990        MEDICATIONS:  Current Outpatient Medications   Medication    amoxicillin (AMOXIL) 500 MG capsule    carbidopa-levodopa (SINEMET)  MG tablet    flecainide  (TAMBOCOR) 50 MG tablet    loratadine (CLARITIN REDITABS) 10 MG ODT    metoprolol tartrate (LOPRESSOR) 25 MG tablet    NONFORMULARY    omeprazole (PRILOSEC) 20 MG DR capsule    PREVIDENT 5000 DRY MOUTH 1.1 % GEL topical gel    rosuvastatin (CRESTOR) 40 MG tablet    warfarin ANTICOAGULANT (COUMADIN) 2.5 MG tablet     No current facility-administered medications for this visit.        ALLERGIES:  Allergies   Allergen Reactions    Seasonal Allergies         SOCIAL HISTORY:  Social History     Socioeconomic History    Marital status:      Spouse name: Angelica    Number of children: 2    Years of education: 21    Highest education level: None   Occupational History    Occupation:      Employer: [employer]     Employer: [employer]   Tobacco Use    Smoking status: Never    Smokeless tobacco: Never   Substance and Sexual Activity    Alcohol use: No    Drug use: No    Sexual activity: Not Currently     Partners: Female     Birth control/protection: None   Other Topics Concern    Parent/sibling w/ CABG, MI or angioplasty before 65F 55M? No   Social History Narrative     about 50 yrs        Wife has some health issues - back pain - second knee replaced    She had gastric bypass and a fib and bad mitral valve        Son in [place]    Daughter in [facility] with 10 yo son.         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[employer]        Born and raised in [facility]                FAMILY HISTORY:  Family History   Problem Relation Age of Onset    Hypertension Mother     Cerebrovascular Disease Mother     Breast Cancer Mother     Alzheimer Disease Father     Arrhythmia Sister         supraventricular tachycardia    Thyroid Disease Sister     Pneumonia Sister         covid19    Other - See Comments Sister         lives in [place]    Arrhythmia Brother     Prostate Cancer Brother     Other - See Comments Brother         [place]    Cancer Brother         lymphome or bone cancer     "Gastrointestinal Disease Maternal Grandmother         colitis     Thyroid Cancer Daughter         had thyroid removed    Bipolar Disorder Daughter     Other - See Comments Daughter         lives in [place] - single with one son 9  yrs    Thyroid Disease Daughter     Other - See Comments Son         lives in [place]. no kids and not .    Obsessive Compulsive Disorder Son     Depression Son     Eating Disorder Son         eats when depressed    Obesity Son     Diabetes Paternal Aunt     Parkinsonism Other         2 cousins with parkinson    C.A.D. No family hx of      PHYSICAL EXAM:  Vital Signs: /66 (BP Location: Left arm, Patient Position: Sitting, Cuff Size: Adult Regular)   Pulse 65   Ht 1.803 m (5' 11\")   Wt 78.3 kg (172 lb 9.6 oz)   SpO2 99%   BMI 24.07 kg/m    GEN: the patient appears in N.A. D. In the surgery clinic.  ABD: soft and non-tender, no masses, no hernias, the G-tube site has a small amount of granulation tissue; this was treated with siver nitrate sticks.  EXT: warm and well perfused.     PROCEDURE:  Haresh Rock was comfortable in supine position.  The previous  20-English 2 cm Jerrell-Key tube  was removed after emptying the balloon  and a 20-English Jerrell-Key tube was advanced into the same location with some initial difficulty.  Gastric contents returned and the balloon was filled to 5 cm.  The patient tolerated the change out to the Jerrell-Key tube with a 2 cm length and a 20 cm English diameter.  The placement was with some difficulty.  A small amount of granulation tissue was treated with silver nitrate sticks and the patient tolerated this as well.  The patient's abdomen is soft and nontender.  The patient is in no apparent distress.     IMPRESSION:  Mr. Haresh Rock, a 72-year-old patient with Parkinson's disease, who had failed multiple swallowing studies, now has a Jerrell-Key tube.  The patient will care for the tube in the standard way.  At this point, these tubes should " probably be replaced every 6 months.  The patient will call or return to arrange for a replacement.  The patient will also call or return should the Jerrell-Key tube develop issues.        >>>>>>>>>>>>>>>>>>>>>>>>>>>>>>>>>    Answers submitted by the patient for this visit:  Symptoms you have experienced in the last 30 days (Submitted on 9/4/2023)  General Symptoms: No  Skin Symptoms: No  HENT Symptoms: Yes  EYE SYMPTOMS: No  HEART SYMPTOMS: No  LUNG SYMPTOMS: No  INTESTINAL SYMPTOMS: No  URINARY SYMPTOMS: No  REPRODUCTIVE SYMPTOMS: No  SKELETAL SYMPTOMS: No  BLOOD SYMPTOMS: No  NERVOUS SYSTEM SYMPTOMS: No  MENTAL HEALTH SYMPTOMS: No  Please answer the questions below to tell us what conditions you are experiencing: (Submitted on 9/4/2023)  Ear pain: No  Ear discharge: No  Hearing loss: No  Tinnitus: No  Nosebleeds: No  Congestion: No  Sinus pain: No  Trouble swallowing: Yes   Voice hoarseness: Yes

## 2023-09-08 ENCOUNTER — ANTICOAGULATION THERAPY VISIT (OUTPATIENT)
Dept: ANTICOAGULATION | Facility: CLINIC | Age: 73
End: 2023-09-08
Payer: COMMERCIAL

## 2023-09-08 ENCOUNTER — TRANSFERRED RECORDS (OUTPATIENT)
Dept: HEALTH INFORMATION MANAGEMENT | Facility: CLINIC | Age: 73
End: 2023-09-08
Payer: COMMERCIAL

## 2023-09-08 DIAGNOSIS — I48.20 CHRONIC ATRIAL FIBRILLATION (H): ICD-10-CM

## 2023-09-08 DIAGNOSIS — I48.0 PAROXYSMAL ATRIAL FIBRILLATION (H): ICD-10-CM

## 2023-09-08 DIAGNOSIS — Z86.711 PERSONAL HISTORY OF PE (PULMONARY EMBOLISM): ICD-10-CM

## 2023-09-08 DIAGNOSIS — Z79.01 LONG TERM CURRENT USE OF ANTICOAGULANT THERAPY: Primary | ICD-10-CM

## 2023-09-08 LAB — INR HOME MONITORING: 2.2 RATIO (ref 2–3)

## 2023-09-08 NOTE — PROGRESS NOTES
ANTICOAGULATION MANAGEMENT     Haresh Rock 73 year old male is on warfarin with therapeutic INR result. (Goal INR 2.0-3.0)    Recent labs: (last 7 days)     09/08/23  1229   INR 2.2       ASSESSMENT     Source(s): Chart Review and Patient/Caregiver Call     Warfarin doses taken: Warfarin taken as instructed  Diet: No new diet changes identified  Medication/supplement changes: None noted  New illness, injury, or hospitalization: No  Signs or symptoms of bleeding or clotting: No  Previous result: Therapeutic last 2(+) visits  Additional findings:  Quarterly patient outreach per home monitor protocol       PLAN     Recommended plan for no diet, medication or health factor changes affecting INR     Dosing Instructions: Continue your current warfarin dose with next INR in 1 week       Summary  As of 9/8/2023      Full warfarin instructions:  2.5 mg every Tue, Fri; 3.75 mg all other days   Next INR check:  9/15/2023               Telephone call with Haresh who verbalizes understanding and agrees to plan    Patient to recheck with home meter    Education provided:   Resume manage by exception with home monitor. Continue to submit INR results to home monitor company.You will only be called when your result is out of range. Please call and notify [facility] if new medication started, dose missed, signs or symptoms of bleeding or clotting, or a surgery/procedure is scheduled.  Contact [phone]  with any changes, questions or concerns.     Plan made per [facility] anticoagulation protocol    Shanice Dhillon RN  Anticoagulation Clinic  9/8/2023    _______________________________________________________________________     Anticoagulation Episode Summary       Current INR goal:  2.0-3.0   TTR:  88.3 % (1 y)   Target end date:  Indefinite   Send INR reminders to:  [facility] HOME MONITORING    Indications    Long-term (current) use of anticoagulants [Z79.01] [Z79.01]  Atrial fibrillation (H) [I48.91]  Antiphospholipid antibody syndrome (H)  (Resolved) [D68.61]  Personal history of DVT (deep vein thrombosis) (Resolved) [Z86.718]  Atrial fibrillation  unspecified type (H) (Resolved) [I48.91]  Paroxysmal atrial fibrillation (H) [I48.0]  Chronic atrial fibrillation (H) [I48.20]  Personal history of PE (pulmonary embolism) [Z86.711]             Comments:  JESSICA rodas to manage by exception             Anticoagulation Care Providers       Provider Role Specialty Phone number    Anya Nowak MD Referring Family Medicine [phone]    Elizabeth Balderas MD Referring Internal Medicine [phone]

## 2023-09-11 ENCOUNTER — OFFICE VISIT (OUTPATIENT)
Dept: PODIATRY | Facility: CLINIC | Age: 73
End: 2023-09-11
Payer: COMMERCIAL

## 2023-09-11 ENCOUNTER — PATIENT OUTREACH (OUTPATIENT)
Dept: CARE COORDINATION | Facility: CLINIC | Age: 73
End: 2023-09-11

## 2023-09-11 VITALS
BODY MASS INDEX: 24.08 KG/M2 | OXYGEN SATURATION: 100 % | HEIGHT: 71 IN | HEART RATE: 59 BPM | WEIGHT: 172 LBS | DIASTOLIC BLOOD PRESSURE: 68 MMHG | SYSTOLIC BLOOD PRESSURE: 118 MMHG

## 2023-09-11 DIAGNOSIS — M20.41 HAMMERTOE, BILATERAL: ICD-10-CM

## 2023-09-11 DIAGNOSIS — L97.521 NEUROPATHIC ULCER OF TOE, LEFT, LIMITED TO BREAKDOWN OF SKIN (H): Primary | ICD-10-CM

## 2023-09-11 DIAGNOSIS — M20.42 HAMMERTOE, BILATERAL: ICD-10-CM

## 2023-09-11 DIAGNOSIS — I73.9 PAD (PERIPHERAL ARTERY DISEASE) (H): ICD-10-CM

## 2023-09-11 PROCEDURE — 11042 DBRDMT SUBQ TIS 1ST 20SQCM/<: CPT | Performed by: PODIATRIST

## 2023-09-11 PROCEDURE — 99203 OFFICE O/P NEW LOW 30 MIN: CPT | Mod: 25 | Performed by: PODIATRIST

## 2023-09-11 ASSESSMENT — PAIN SCALES - GENERAL: PAINLEVEL: MILD PAIN (2)

## 2023-09-11 NOTE — PATIENT INSTRUCTIONS
Important lnstructions    WEIGHT BEARING STATUS: You are to remain NON WEIGHT BEARING on your left foot. NON WEIGHT BEARING MEANS NO PRESSURE ON YOUR FOOT OR HEEL AT ANY TIME FOR ANY REASON!    2. OFFLOADING DEVICE: Must use a A WHEELCHAIR at all times! (do not use affected foot to push wheelchair)    3. STABILIZATION DEVICE: Use a CAM BOOT . You will need to WEAR THIS ANYTIME YOU ARE UP AND OUT OF BED, IT IS OKAY TO REMOVE WHEN YOU ARE SLEEPING..     4. ELEVATE: Elevating your leg means laying with your head on a pillow and your foot ABOVE YOUR HEART.     5. DO NOT MOVE YOUR FOOT.  There is a risk of worsening the wound or incision. To give yourself a higher chance of healing, please DO NOT swing foot back and forth and wiggle foot/toes especially when inside a stabilization device. Limited movement is allowable with therapy as recommended by the doctor.     6. TAKE A PROTEIN SHAKE TWICE A DAY.  (For ex: Boost, Ensure, Glucerna)      Dressing Change lnstructions          - Dressing Application of  Endoform for left foot wound:    1. Endoform should be cut to the size of the wound.  It should touch the edges of the ulcer. It is okay if it overlap the edges a small amount.  Then, moisten the Endoform with saline.(If it is easier for you, you can moisten it before laying it in the wound)    2. Cover the wound with Endoform, followed by dry gauze, and secure with roll gauze if needed.     3. Endoform is naturally used by the body over time so you may not find it in the wound when you change your dressing.  If you do find some, gently cleanse the wound with saline. Do not remove the remaining Endoform, which may appear as an off-white to fletcher gel, just add Endoform on top.  Usually, more Endoform will need to be added every 5-7 days.     4. Change your top dressing every 1-2 days or as needed depending on drainage.    -Endoform is a collagen dressing created from ovine (sheep) fore-stomach tissue. The collagen  extracellular matrix transforms into a soft conforming sheet, which is naturally incorporated into the wound over time.  Collagen dressings are used to stimulate wound healing.   ,        It IS NOT ok to get your wound wet in the bath or shower    SEEK MEDICAL CARE IF:  You have an increase in swelling, pain, or redness around the wound.  You have an increase in the amount of pus coming from the wound.  There is a bad smell coming from the wound.  The wound appears to be worsening/enlarging  You have a fever greater than 101.5 F      It is ok to continue current wound care treatment/products for the next 2-3 days until new wound care supplies are ordered and arrive. If longer than this please contact our office at [phone].        We want to hear from you!   In the next few weeks, you should receive a call or email to complete a survey about your visit at [facility]. Please help us improve your appointment experience by letting us know how we did today. We strive to make your experience good and value any ways in which we could do better.      We value your input and suggestions.    Thank you for choosing the [facility]!

## 2023-09-11 NOTE — PROGRESS NOTES
FOOT AND ANKLE SURGERY/PODIATRY CONSULT NOTE        ASSESSMENT:   Neuropathic Ulceration 2nd digit left foot  [place]        TREATMENT:  -I discussed with the patient that he has a full-thickness ulceration along the plantar second agent left foot at the level of the PIPJ.  No signs of infection noted on exam today.    -We reviewed recommendations for nonweightbearing on the left foot, keep the left foot clean and dry, topical wound care dressings.  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    -I referred him to [facility] for a wheelchair.  Referred for cam boot.    -Referred for ABIs    -After discussion of risk factors nursing staff removed dressing, cleansed wound and consent obtained 2% Lidocaine HCL jelly was applied, under clean conditions, the second digit left foot ulceration(s) were debrided using .into the subcutaneous tissue.  Devitalized and nonviable tissue, along with any fibrin and slough, was removed to improve granulation tissue formation, stimulate wound healing, decrease overall bacteria load, disrupt biofilm formation and decrease edge senescence. Wound drainage was scant No. Total excisional debridement was 0.36 sq cm into the subcutaneous tissue with a depth of 0.36 cm.   Ulcers were improved afterwards and .  Measures were as noted on the flow sheet. Collagen with a gauze dresssing was applied. He will continue to apply Collagen with a gauze dresssing every 4 to 5 days.      Does the patient have mobility limitation significantly impairs his/her ability to participate in one or more mobility-related activities of daily living such as toileting, dressing, grooming, and bathing in customary locations in the home?  Yes     Can the patient's mobility limitation be sufficiently resolved by the use of an appropriately fitted cane, crutches or walker?  No   Patient cannot use a cane, crutches, or walker due to the need for non-weight bearing  status.     Does the patient's home provide adequate access between rooms, maneuvering space, and surfaces for the use of a manual wheelchair?  Yes     Is the patient or caregiver able to safely use/maneuver the wheelchair and expressed willingness to use the manual wheelchair in the home on a regular basis?  Yes    Can the patient's functional mobility deficit be sufficiently resolved by the use of a manual wheelchair and significantly improve the patient's ability to participate in mobility-related activities of daily living?  Yes     Does the patient have sufficient upper extremity function and other physical and mental capabilities needed to safely propel the manual wheel chair during a typical day?  Yes    Does the patient have a caregiver who is willing, available, and is able to provide assistance with the wheelchair.  Yes     If a standard jaquelin-wheelchair is ordered does the patient require a lower seat height because of short stature or to enable the patient to place his/her feet on the ground for propulsion?  No     If a lightweight wheelchair is ordered is the patient unable to self-propel in a standard weight wheelchair and would be able to self-propel in a lightweight chair?  Yes     Does your patient require height adjustable arms because they require an arm height that is different than that available using nonadjustable arms?  No     Does your patient spend more than 2 hours per day in the wheelchair?  Yes    Does your patient require a safety belt because they have weak upper body muscles, upper body instability, muscle spasticity which requires use of this item for positioning? No      Does the beneficiary self-propel and require anti-rollback devices because of ramps?  No     If the patient is in need of a bariatric wheelchair is their weight over 250 lbs?  No     Does the patient require elevating leg rests?   Yes   Elevating leg rests are needed due to a musculoskeletal condition or presence of  a cast or brace which prevents 90 degree flexion at the knee.    -He will follow-up in 3 weeks    Yuval Hampton DPM  [facility] [place]      HPI: Haresh Rock was seen today for an area of skin discoloration along the second toe left foot.  Patient states that he went for a pedicure recently and they recommended he be seen by foot specialist for discoloration on the plantar second edge on the left foot.  This was first discovered approximate 2 weeks ago.  He denies drainage.  Patient says he lives with his wife who he helps take care of.  Past medical history significant for peripheral neuropathy, Parkinson's.    Past Medical History:   Diagnosis Date    Abnormal dopamine scan (DaTSCAN) 2020 11/04/2020    [phone] 11/3/2020   Narrative & Impression   Examination: Nuclear medicine DATscan for Dopamine Receptor Localization.   Examination: NM Brain Image Tomographic (SPECT) DATscan   Date: 11/3/2020 3:21 PM   Indication: Parkinsonism   Comparison: None   Additional Information: none   Interfering Medications: None   Technique:   The patient initially received 1 ml of Lugol's solution orally prior    Antiphospholipid antibody syndrome (H)     Ravi's, noted negative per testing re-done in 2008 in hematology note 01/13/2009    Articulation disorder 09/23/2020    Atrial fibrillation (H) 04/2011    Benign prostatic hyperplasia with urinary retention     Cirrhosis (H)     CKD (chronic kidney disease) stage 2, GFR 60-89 ml/min     Diabetes mellitus type II     steroid induced    DJD (degenerative joint disease) of knee     SHAWN, worse on right    DVT (deep venous thrombosis) (H)     ED (erectile dysfunction)     Gallbladder polyp 05/2010    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    Hemochromatosis     Hodgkin's disease NOS     5 cycles of ABVD in 2011    HTN (hypertension)     Hyperlipidaemia LDL goal <100     Multiple thyroid nodules     benign     Myeloproliferative disorder (H)     atypical, [facility]     Parkinson disease (H) 09/24/2020    PE (pulmonary embolism) 11/2004    Polyneuropathy     Pressure ulcer     Primary pulmonary hypertension (H)     Severe protein-calorie malnutrition (H) 11/10/2021    Small bowel obstruction (H) 10/29/2021    Thrombocytopenia (H) 06/08/2021       Past Surgical History:   Procedure Laterality Date    ANESTHESIA CARDIOVERSION  04/21/2011    Procedure:ANESTHESIA CARDIOVERSION; Surgeon:GENERIC ANESTHESIA PROVIDER; Location:[facility] OR    ARTHROSCOPY KNEE RT/LT      LT    CATARACT IOL, RT/LT  2017    COLONOSCOPY  10/16/2012    Procedure: COLONOSCOPY;  Colonoscopy, screening;  Surgeon: Reg Feliciano MD;  Location: [facility] OR    COLONOSCOPY N/A 04/14/2021    Procedure: COLONOSCOPY;  Surgeon: Reg Holm MD;  Location: [facility] GI    ESOPHAGOSCOPY, GASTROSCOPY, DUODENOSCOPY (EGD), COMBINED N/A 10/07/2020    Procedure: ESOPHAGOGASTRODUODENOSCOPY, WITH BIOPSY;  Surgeon: Raul Sawyer DO;  Location: [facility] OR    H ABLATION FOCAL AFIB  03/05/2013    PVI    H ABLATION FOCAL AFIB  01/01/2018    HC BONE MARROW/STEM TRANSPLANT ALLOGENIC  05/08/2007    INSERT PORT VASCULAR ACCESS      IR GASTROSTOMY TUBE PERCUTANEOUS PLCMNT  10/25/2021    JOINT REPLACEMTN, KNEE RT/LT  03/28/2012    SHAWN    LAPAROSCOPY DIAGNOSTIC (GENERAL) N/A 10/29/2021    Procedure: LAPAROSCOPY, DIAGNOSTIC, TAKEDOWN OF MALPOSITIONED GASTROSTOMY TUBE, INSERTION OF GASTROSTOMY TUBE, SMALL BOWEL RESECTION X1, PRIMARY REPAIR OF SMALL BOWEL ENTEROTOMY, ABDOMINAL WASHOUT, TAP BLOCK;  Surgeon: Leif Finney DO;  Location: [facility] OR    PEG TUBE PLACEMENT  10/25/2021    VASECTOMY  1990        Allergies   Allergen Reactions    Seasonal Allergies          Current Outpatient Medications:     amoxicillin (AMOXIL) 500 MG capsule, Take 2,000 mg by mouth once Take 1 hour prior to dental procedure, Disp: , Rfl:     carbidopa-levodopa (SINEMET)  MG tablet, 1 tab 3/day at 8am, 2pm and 8pm, Disp: 270 tablet, Rfl: 3    flecainide  (TAMBOCOR) 50 MG tablet, TAKE 1 TABLET TWICE A DAY, Disp: 180 tablet, Rfl: 2    loratadine (CLARITIN REDITABS) 10 MG ODT, Take 10 mg by mouth daily, Disp: , Rfl:     metoprolol tartrate (LOPRESSOR) 25 MG tablet, 0.5 tablets (12.5 mg) by Oral or Feeding Tube route 2 times daily (crush tablet), Disp: 90 tablet, Rfl: 3    NONFORMULARY, Abbott osmolyte feedings 2 cans 3/day, Disp: , Rfl:     omeprazole (PRILOSEC) 20 MG DR capsule, TAKE 1 CAPSULE BY MOUTH EVERY MORNING., Disp: 90 capsule, Rfl: 0    PREVIDENT 5000 DRY MOUTH 1.1 % GEL topical gel, Apply to affected area daily as needed Or brushes with water, Disp: , Rfl: 3    rosuvastatin (CRESTOR) 40 MG tablet, Take 1 tablet (40 mg) by mouth At Bedtime, Disp: 90 tablet, Rfl: 3    warfarin ANTICOAGULANT (COUMADIN) 2.5 MG tablet, TAKE 1 TABLET (2.5 MG) by mouth every Tue, Fri; and take ONE AND ONE-HALF TABLETS (3.75 MG) ALL OTHER DAYS OR AS DIRECTED BY ANTICOAGULATION CLINIC, Disp: 180 tablet, Rfl: 1    Social History     Social History Narrative     about 48 yrs        Wife has some health issues - back pain - second knee replaced    She had gastric bypass and a fib and bad mitral valve        Son in [place]    Daughter in [facility] with 10 yo son.         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[employer]        Born and raised in [facility]                Family History   Problem Relation Age of Onset    Hypertension Mother     Cerebrovascular Disease Mother     Breast Cancer Mother     Alzheimer Disease Father     Arrhythmia Sister         supraventricular tachycardia    Thyroid Disease Sister     Pneumonia Sister         covid19    Other - See Comments Sister         lives in [place]    Arrhythmia Brother     Prostate Cancer Brother     Other - See Comments Brother         [place]    Cancer Brother         lymphome or bone cancer    Gastrointestinal Disease Maternal Grandmother         colitis     Thyroid Cancer Daughter         had thyroid  "removed    Bipolar Disorder Daughter     Other - See Comments Daughter         lives in [place] - single with one son 9  yrs    Thyroid Disease Daughter     Other - See Comments Son         lives in [place]. no kids and not .    Obsessive Compulsive Disorder Son     Depression Son     Eating Disorder Son         eats when depressed    Obesity Son     Diabetes Paternal Aunt     Parkinsonism Other         2 cousins with parkinson    C.A.D. No family hx of        Review of Systems - 10 point Review of Systems is negative except for ulceration second digit left foot which is noted in HPI.      OBJECTIVE:  Appearance: alert, well appearing, and in no distress.    /68   Pulse 59   Ht 1.803 m (5' 11\")   Wt 78 kg (172 lb)   SpO2 100%   BMI 23.99 kg/m      BMI= Body mass index is 23.99 kg/m .    General appearance: Patient is alert and fully cooperative with history & exam.  No sign of distress is noted during the visit.     Psychiatric: Affect is pleasant & appropriate.  Patient appears motivated to improve health.     Respiratory: Breathing is regular & unlabored while sitting.     HEENT: Hearing is intact to spoken word.  Speech is clear.  No gross evidence of visual impairment that would impact ambulation.    Vascular: Dorsalis pedis palpableBilateral.  Dermatologic:   Gastrostomy/Enterostomy Gastrostomy LUQ 1 18 fr (Active)       Incision/Surgical Site 10/30/21 Right;Upper Abdomen (Active)       Incision/Surgical Site 10/30/21 Left;Upper Abdomen (Active)       Incision/Surgical Site 10/30/21 Upper;Midline Abdomen (Active)       Incision/Surgical Site 10/30/21 Umbilicus (Active)   Ulceration plantar PIPJ 2nd digit left foot 0.6x0.6x0.3cm, granular base. No erythema bilateral.   Neurologic: Diminished to light touch Bilateral.  Musculoskeletal: Contracted digits noted Bilateral.      "

## 2023-09-11 NOTE — LETTER
2023             [facility]             Wound Dressing Rx and Order Form             Order Status:New Order             Verbal: Robyn Chow  [facility]   Account # [id number]  Fax: [phone]  Customer Service: [phone]          Patient Info:  Name: Haresh Rock  : 1950  Address:   [facility] [address] [facility] [address]  Phone: [phone]      Insurance Info:  PRIMARY INSURANCE: Payor: MEDICA / Plan: MEDICA ADVANTAGE SOLUTIONS / Product Type: HMO /   Primary Policy ID#: [id number]  SECONDARY INSURANCE:     Secondary Policy ID#:     Physician Info:   Name:  Yuval Hampton DPM   Dept Address/Phones:   [address] [address] [phone]  Dept: [phone]  Fax: [phone]        Wound info:  2nd digit left foot 0.6x0.6x0.3cm     Drainage: moderate  Thickness:  Full  Duration of Need: 30  Days Supply: 30  Start Date: 2023  Starter Kit: Ancillary Kit (saline, gloves, gauze)  Qualifying wound/Debridement: Yes     Dressing Type Brand Size Days Supply  15/30 Quantity  changes/week   Primary Endoform   2''x2'' 30 Weekly   Secondary Square gauze   4''x4'' 30 Daily    Sof form roll gauze   4''x75'' 30 Daily   Tape Papertape  1in 30 Daily         OK to forward to covered supplier.    Electronically Signed Physician: Yuval Hampton DPM Date: 2023

## 2023-09-12 ENCOUNTER — PATIENT OUTREACH (OUTPATIENT)
Dept: CARE COORDINATION | Facility: CLINIC | Age: 73
End: 2023-09-12
Payer: COMMERCIAL

## 2023-09-12 NOTE — PROGRESS NOTES
Clinic Care Coordination Contact  Care Coordination Clinician Chart Review    Situation: Patient chart reviewed by Care Coordinator.       Background: Care Coordination Program started: 11/5/2021. Initial assessment completed and patient-centered care plan(s) were developed with participation from patient. Lead CC handed patient off to CHW for continued outreaches.       Assessment: Per chart review, patient outreach completed by CC CHW on 8/11/23.  Patient is actively working to accomplish goal(s). Patient's goal(s) appropriate and relevant at this time. Patient is due for updated Plan of Care.  Assessments will be completed annually or as needed/with change of patient status.      Care Plan: General  Work on increasing strength and stamina for a year after reaching 100%       Problem: HP GENERAL PROBLEM       Goal: General  work on walking without the walker, and increasing my strength and stamina.  For at least 1 year after reaching 100%.       Start Date: 12/13/2021 Expected End Date: 6/13/2024    This Visit's Progress: On track Recent Progress: 90%    Note:     Barriers: suffers from parkinson's  Strengths: engaged in care coordination  Patient expressed understanding of goal: yes  Action steps to achieve this goal:  1. I will continue walking without the walker. No longer using the walker as of 3/15/22. Completed action step  2. I will go outside without the walker when I feel strong enough and have increased my strength and stamina.  Continue to work on for at least a year.  3. I will frequently walk around inside the house to increase my strength and stamina.  Continue to work on for at least a year.                            Care Plan: General - Working on balance and items in the path,       Problem: HP GENERAL PROBLEM       Goal: General Goal - work on balance and avoiding items in the path       Start Date: 10/28/2022 Expected End Date: 10/28/2023    This Visit's Progress: 80% Recent Progress: 70%     Note:     Barriers: parkinson's  Strengths: engaged in care coordination  Patient expressed understanding of goal: yes   Action steps to achieve this goal:  1. I will work on walking around items in my path without losing my balance.  2. I will work on maintaining my balance when I walk through the house.  3. I will use my walker or cane as needed to maintain my balance.                                   Plan/Recommendations: The patient will continue working with Care Coordination to achieve goal(s) as above. CHW will continue outreaches at minimum every 30 days and will involve Lead CC as needed or if patient is ready to move to Maintenance. Lead CC will continue to monitor CHW outreaches and patient's progress to goal(s) every 6 weeks.     Plan of Care updated and sent to patient: Yes, via iTwin.            Uriel Zhou MSN, RN, PHN, [facility]   Primary Care Clinical RN Care Coordinator  [facility]  9/12/2023   3:22 PM  [email or web address]  Office: [phone]

## 2023-09-12 NOTE — LETTER
[facility]  Patient Centered Plan of Care  About Me:        Patient Name:  Haresh Granados    YOB: 1950  Age:         73 year old   Radha MRN:    [id number] Telephone Information:  Home Phone [phone]   Mobile [phone]       Address:  [address] [place] [address] Email address:  [email or web address]      Emergency Contact(s)    Name Relationship Lgl Grd Work Phone Home Phone Mobile Phone   1. JANICE GRANADOS Spouse No   [phone]   2. LAY GRANADOS Daughter No   [phone]   3. SOL GRANADOS Son No   [phone]           Primary language:  English     needed? No   [employer]:  [phone] op. 1  Other communication barriers:Glasses; Language barrier    Preferred Method of Communication:  Mail  Current living arrangement: I live in a private home with family    Mobility Status/ Medical Equipment: Independent        Health Maintenance  Health Maintenance Reviewed: Due/Overdue   Health Maintenance Due   Topic Date Due    HF ACTION PLAN  Never done    HEPATITIS A IMMUNIZATION (1 of 2 - Risk 2-dose series) Never done    ZOSTER IMMUNIZATION (1 of 2) 11/14/2016    DTAP/TDAP/TD IMMUNIZATION (2 - Td or Tdap) 09/11/2022    COVID-19 Vaccine (7 - Moderna risk series) 07/04/2023    A1C  08/25/2023    INFLUENZA VACCINE (1) 09/01/2023    CBC  09/06/2023    MEDICARE ANNUAL WELLNESS VISIT  10/11/2023    LIPID  10/11/2023    MICROALBUMIN  10/11/2023    DIABETIC FOOT EXAM  10/11/2023           My Access Plan  Medical Emergency 911   Primary Clinic Line [facility] - [phone]   24 Hour Appointment Line [phone] or  [phone]) (toll-free)   24 Hour Nurse Line [phone] (toll-free)   Preferred Urgent Care No data recorded   Preferred [facility]  [phone]     Preferred Pharmacy [id number] - [place] - [address] Behavioral  Health Crisis Line The National Suicide Prevention Lifeline at 1-796.959.8503 or Text/Call 988             My Care Team Members  Patient Care Team         Relationship Specialty Notifications Start End    Anya Nowak MD PCP - General Family Practice  4/23/19     Phone: [phone] Fax: [phone] [address] [facility] [facility] [address]    Augusto Miller MD MD Hematology Admissions 9/30/11     Referred to Endocrinology    Phone: [phone] Fax: [phone]         [address] [facility] [address]    Jesusita Mejia PA-C Physician Assistant   3/21/12     Phone: [phone] Fax: [phone]         [address] [place] [facility] [address] [facility] [address]    Pino Sharma MD MD Internal Medicine  4/11/16     Phone: [phone] Fax: [phone]         [address] [place] [facility] [address] [facility] [address]    Fabi Jimenez MD MD INTERNAL MEDICINE - ENDOCRINOLOGY, DIABETES & METABOLISM  4/18/16     Phone: [phone] Fax: [phone]         [address] [place] [facility] [address] [facility] [address]    Bekah Matt MD MD Cardiology  8/12/16     Phone: [phone] Fax: [phone]         [address] [facility] [address] [facility] [address]    Tina Mejia RN Specialty Care Coordinator Cardiology  3/6/19     Phone: [phone] Fax: [phone]         [address] [place] [address]    Bekah Matt MD Assigned Heart and Vascular Provider   10/23/20     Phone: [phone] Fax: [phone]         [address] [facility] [address] [place] [address]    Ángel Hill MD Assigned Neuroscience Provider   11/15/20     Phone: [phone] Fax: [phone]         [address] [place] [address]    Rosalva Samuels MD MD Family Medicine  6/1/21     Phone: [phone] Fax: [phone] [address] [facility] [address]    Bekah Matt MD MD Clinical Cardiac Electrophysiology  9/10/21     Phone: [phone] Fax: [phone]         [address] [place] [address]  [address]    Marcelo Jacques MD Assigned Infectious Disease Provider   9/12/21     Phone: [phone] Fax: [phone]         [address] [facility] [address]    Uriel Van, RN Lead Care Coordinator Primary Care - CC Admissions 11/5/21     Phone: [phone] Fax: [phone]        Lizbeth Chen [facility] Pharmacist Pharmacist Ambulatory Care  11/12/21     Phone: [phone] [address] [facility] [address]    Leslie Clifford [facility] Pharmacist Pharmacist  12/13/21     Phone: [phone] Pager: [phone]         [address] [facility] [address]    Bonnie Walls RN Specialty Care Coordinator Hematology & Oncology Admissions 12/16/21     Phone: [phone] Pager: [phone]        Maye Rain Community Health Worker Primary Care - CC Admissions 4/18/22     Phone: [phone] Fax: [phone]        Reg Cantu MD MD Critical Care  6/9/22     Phone: [phone] Fax: [phone]         [address] [place] [address]    Reg Cantu MD Assigned Surgical Provider   2/18/23     Phone: [phone] Fax: [phone]         [address] [place] [address]    Augusto Miller MD Assigned Cancer Care Provider   7/1/23     Phone: [phone] Fax: [phone]         [address] [facility] [address]    Nayana Hummel PA-C Assigned PCP   7/8/23     Phone: [phone] Fax: [phone] [address] [facility] [address]    Lizbeth Chen [facility] Assigned MTM Pharmacist   7/22/23     Phone: [phone] [address] [facility] [address]              My Care Plans  Self Management and Treatment Plan  Care Plan  Care Plan: General  Work on increasing strength and stamina for a year after reaching 100%       Problem: HP GENERAL PROBLEM       Goal: General  work on walking without the walker, and increasing my strength and stamina.  For at least 1 year after reaching 100%.       Start Date: 12/13/2021  Expected End Date: 6/13/2024    This Visit's Progress: On track Recent Progress: 90%    Note:     Barriers: suffers from parkinson's  Strengths: engaged in care coordination  Patient expressed understanding of goal: yes  Action steps to achieve this goal:  1. I will continue walking without the walker. No longer using the walker as of 3/15/22. Completed action step  2. I will go outside without the walker when I feel strong enough and have increased my strength and stamina.  Continue to work on for at least a year.  3. I will frequently walk around inside the house to increase my strength and stamina.  Continue to work on for at least a year.                            Care Plan: General - Working on balance and items in the path,       Problem: HP GENERAL PROBLEM       Goal: General Goal - work on balance and avoiding items in the path       Start Date: 10/28/2022 Expected End Date: 10/28/2023    This Visit's Progress: 80% Recent Progress: 70%    Note:     Barriers: parkinson's  Strengths: engaged in care coordination  Patient expressed understanding of goal: yes   Action steps to achieve this goal:  1. I will work on walking around items in my path without losing my balance.  2. I will work on maintaining my balance when I walk through the house.  3. I will use my walker or cane as needed to maintain my balance.                                 Action Plans on File:                       Advance Care Plans/Directives Type:   Advanced Directive - On File      My Medical and Care Information  Problem List   Patient Active Problem List   Diagnosis    Hemochromatosis    Nlgkm-glbqrq-chgy disease, chronic (H)    Advanced directives, counseling/discussion    Polyneuropathy    Status post total knee replacements    Status post bone marrow transplant (H)    Hyperlipidemia with target LDL less than 100    Atrial fibrillation (H)    Chronic rhinitis    Gallstones without obstruction of gallbladder    Long-term (current) use  of anticoagulants [Z79.01]    Thyroid nodule    Type 2 diabetes mellitus with stage 2 chronic kidney disease, without long-term current use of insulin (H)    History of Hodgkin's lymphoma    History of irradiation, presenting hazards to health    Primary pulmonary hypertension (H)    Hereditary hemochromatosis (H)    Oropharyngeal dysphagia    Articulation disorder    Personal history of PE (pulmonary embolism)    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    Care Coordination Start Date: 11/5/2021   Frequency of Care Coordination: monthly     Form Last Updated: 09/12/2023

## 2023-09-15 ENCOUNTER — TRANSFERRED RECORDS (OUTPATIENT)
Dept: HEALTH INFORMATION MANAGEMENT | Facility: CLINIC | Age: 73
End: 2023-09-15
Payer: COMMERCIAL

## 2023-09-15 ENCOUNTER — DOCUMENTATION ONLY (OUTPATIENT)
Dept: ANTICOAGULATION | Facility: CLINIC | Age: 73
End: 2023-09-15
Payer: COMMERCIAL

## 2023-09-15 ENCOUNTER — PATIENT OUTREACH (OUTPATIENT)
Dept: CARE COORDINATION | Facility: CLINIC | Age: 73
End: 2023-09-15
Payer: COMMERCIAL

## 2023-09-15 DIAGNOSIS — Z86.711 PERSONAL HISTORY OF PE (PULMONARY EMBOLISM): ICD-10-CM

## 2023-09-15 DIAGNOSIS — I48.0 PAROXYSMAL ATRIAL FIBRILLATION (H): ICD-10-CM

## 2023-09-15 DIAGNOSIS — I48.20 CHRONIC ATRIAL FIBRILLATION (H): ICD-10-CM

## 2023-09-15 DIAGNOSIS — Z79.01 LONG TERM CURRENT USE OF ANTICOAGULANT THERAPY: Primary | ICD-10-CM

## 2023-09-15 LAB — INR HOME MONITORING: 2.8 RATIO (ref 2–3)

## 2023-09-15 NOTE — PROGRESS NOTES
Clinic Care Coordination Contact  Community Health Worker Follow Up    Care Gaps:     Health Maintenance Due   Topic Date Due    HF ACTION PLAN  Never done    HEPATITIS A IMMUNIZATION (1 of 2 - Risk 2-dose series) Never done    ZOSTER IMMUNIZATION (1 of 2) 11/14/2016    DTAP/TDAP/TD IMMUNIZATION (2 - Td or Tdap) 09/11/2022    COVID-19 Vaccine (7 - Moderna risk series) 07/04/2023    A1C  08/25/2023    INFLUENZA VACCINE (1) 09/01/2023    CBC  09/06/2023    MEDICARE ANNUAL WELLNESS VISIT  10/11/2023    LIPID  10/11/2023    MICROALBUMIN  10/11/2023    DIABETIC FOOT EXAM  10/11/2023       CHW reminded patient he is due to see PCP for AWV and follow up visit.    Care Plan:   Care Plan: General  Work on increasing strength and stamina for a year after reaching 100%       Problem: HP GENERAL PROBLEM       Goal: General  work on walking without the walker, and increasing my strength and stamina.  For at least 1 year after reaching 100%.       Start Date: 12/13/2021 Expected End Date: 6/13/2024    This Visit's Progress: On Hold Recent Progress: On track    Note:     Barriers: suffers from parkinson's  Strengths: engaged in care coordination  Patient expressed understanding of goal: yes  Action steps to achieve this goal:  1. I will continue walking without the walker. No longer using the walker as of 3/15/22. Completed action step  2. I will go outside without the walker when I feel strong enough and have increased my strength and stamina.  Continue to work on for at least a year.  3. I will frequently walk around inside the house to increase my strength and stamina.  Continue to work on for at least a year.                            Care Plan: General - Working on balance and items in the path,       Problem: HP GENERAL PROBLEM       Goal: General Goal - work on balance and avoiding items in the path       Start Date: 10/28/2022 Expected End Date: 10/28/2023    This Visit's Progress: On Hold Recent Progress: 80%    Note:      Barriers: parkinson's  Strengths: engaged in care coordination  Patient expressed understanding of goal: yes   Action steps to achieve this goal:  1. I will work on walking around items in my path without losing my balance.  2. I will work on maintaining my balance when I walk through the house.  3. I will use my walker or cane as needed to maintain my balance.                                Intervention and Education during outreach: Patient has an ulceration on one of his toes of his left foot and is currently non weightbearing on that foot. He states he was placed in a boot but with his Parkinsons it is very difficult to walk in it. He is limiting his walking and activity due to this therefore his goals were placed on hold.     CHW Plan: CHW will continue to support patient with goals through routine scheduled outreach.     Next outreach due: 10/13/23

## 2023-09-15 NOTE — PROGRESS NOTES
ANTICOAGULATION  MANAGEMENT-Home Monitor Managed by Exception    Haresh EMILIE Rock 73 year old male is on warfarin with therapeutic INR result. (Goal INR 2.0-3.0)    Recent labs: (last 7 days)     09/15/23  1743   INR 2.8       Previous INR was Therapeutic  Medication, diet, health changes since last INR:chart reviewed; none identified  Contacted within the last 12 weeks by phone on 9/8/23      MINNIE Hutson was NOT contacted regarding therapeutic result today per home monitoring policy manage by exception agreement.   Current warfarin dose is to be continued:     Summary  As of 9/15/2023      Full warfarin instructions:  2.5 mg every Tue, Fri; 3.75 mg all other days   Next INR check:  9/22/2023             ?   Shanice Dihllon RN  Anticoagulation Clinic  9/15/2023    _______________________________________________________________________     Anticoagulation Episode Summary       Current INR goal:  2.0-3.0   TTR:  88.3 % (1 y)   Target end date:  Indefinite   Send INR reminders to:  [facility]    Indications    Long-term (current) use of anticoagulants [Z79.01] [Z79.01]  Atrial fibrillation (H) [I48.91]  Antiphospholipid antibody syndrome (H) (Resolved) [D68.61]  Personal history of DVT (deep vein thrombosis) (Resolved) [Z86.718]  Atrial fibrillation  unspecified type (H) (Resolved) [I48.91]  Paroxysmal atrial fibrillation (H) [I48.0]  Chronic atrial fibrillation (H) [I48.20]  Personal history of PE (pulmonary embolism) [Z86.711]             Comments:  JESSICA rodas to manage by exception             Anticoagulation Care Providers       Provider Role Specialty Phone number    Anya Nowak MD Referring Family Medicine [phone]    Elizabeth Balderas MD Referring Internal Medicine [phone]

## 2023-09-21 NOTE — PROGRESS NOTES
Diagnosis/Summary/Recommendations:    PATIENT: Haresh Rock  73 year old male     : 1950    KEN: 2023    MRN: [id number]  [address] [place] [address]  [phone] (M)  [phone] (H)  [email or web address]     - sheyla Ramirez - son  Gemma - daughter  Sheyla Rock  [phone]     [email or web address]  [email or web address]     [phone]     Sitting in a lazy boy     [phone]     Assessment:  (G20) Parkinson disease (H)  (primary encounter diagnosis)     Dopamine scan - (datscan)  11/3/2020  A presynaptic dopaminergic deficit is demonstrated bilaterally.     Review of diagnosis    parkinson  Avoidance of dopamine blockers yes  Motor complication review  -   Review of Impulse control disorders no  Review of surgical or medication options yes     Avoidance of dopamine blockers   Not ta josiane     Motor complication review   no     Review of Impulse control disorders   denies     Review of surgical or medication options   reviewed     Gait/Balance/Falls   No falls     Exercise/Therapy performed/offered   Doing shoSmoveing snow     Cognitive/Driving   Driving - not problems  No cognitive problems - no changes        Mood   Daughter gemma with bipolar - 2 or 3 miles away   Son july with mood issues  2 or 3 miles away   He has some depression     Living with Sheyla - has ongoing back pain - getting a tooth extracted this afternoon;  Constipation, heart and bladder issues.   Constipation or diarrhea - has problems controlling her bowels     Hallucinations/delusions   No hallucinations     Sleep   Pseudoeph-doxylamine DM APAP nyquil - not using  Sleep pretty well  Nocturia 2/noc  Falls asleep during the day  Napping during the day  Falls asleep when watching news  Falls asleep during the afternoon  Up 3/noc for bathroom   Midnight goes to bed and up at 8am  Naps during day   Not talking in sleep or snoring.  No unusual behaviors  Wakes up before 3am, 5/6a and 7am      Bladder/Renal/Prostate/Gyn/Other  Renal function.   May have stage 2 disease.      Prostate - denies problems. He has has nocturia couple times per night.   Denies elevated psa     Prostate surgery; urinating better     Dutasteride avodart 0.5mg - off   Tamsulosin flomax 0.4mg - stopped  Had low blood pressure and fluids     July 5th ED visit and got catheter  Surgery yesterday laser and should get catheter out  Dr Jose G Hawley     GI/Constipation/GERD   Swallow study 9/22/2020  1. Multiple events of deep laryngeal penetration with thinner barium consistencies with intermittent aspiration.      Gallstones but has not gallbladder removed.  Had a gallbladder attack x 1 and is not on actigall     Bisacodyl dulcolax 5mg EC tablet - not using   Calcium carbonate tums 500mg ?  Nutritional supplement - 1 bottle per day  - not using   Pantoprazole protonix 20mg - not using  Polythyelene glycol miralax - not using   Prevident 5000 dry mouth - using  Senna-docusate senexon-S senokot-S  - no  Weight loss better with feeding tube  No constipation  Has some seepage  When urinates he has some stool     Had bloating and blockage with the feeding tube placement and had emergency surgery   10/29/2021 Scan showed problems     Feeding tube has helped him gain 20 lbs - he gets feedings 1 hour after medications so 9am, 3pm and 9pm     Takes parkinson medications by mouth for the first two doses and crush the last one of the day through the feeding tube  Schedule is 8am, 2pm and 8pm     With his fecal seepage discussed the use of a bidet or adapting his toilet with one - h e has a raised toilet seat so not clear what needs to be done. Consider seeing Occupational therapy.      Formula switched from abbott to nestle  161 or so lbs        ENDO/Lipid/DM/Bone density/Thyroid  Cholesterol  Rosuvastatin crestor 40mg   Vitamin D3 cholecalciferol 50 mcg 2000 units  Had diabetes in the past from prednisone  And this may have resolved  May have  a thyroid nodule.  TSH was normal.   A1c was 5.5         Cardio/heart/Hyper or Hypotensive   Flecainide tambocor 50mg twice daily  Metoprolol succinate ER Toprol XL 25mg 24 hr tablet  Blood pressure has been okay   122/63  Low bp has cleared up  MRI of heart next week     Vision/Dry Eyes/Cataracts/Glaucoma/Macular   Wears glasses  Eye - left eye has been poor since age 2 due to a lazy eye - amblyopia  Right eye post cataract surgery had an artificial lens put in and does not wear glasses till the evening         Heme/Anticoagulation/Antiplatelet/Anemia/Other  Folic acid folvite 1mg - not taking   Anticoagulated  Warfarin     Hereditary hemochromatosis gene - gene that increases his risk and has not had problems with this. Had had phlebotomies x 2 in the past.      AYUSH Torres     History of pulmonary embolii - back in 2004. This was related to blood clotting problems.   May have had a DVT. Reportedly has had antiphospholipid antibody. He is on coumadin.      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 Had a transplant from Dr. 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  No problems since then.      Graft vs host     He has high sed rate and will be seeing ID next week   crp was 12.8  Sed rate was 90  8/3/2021  Working with Marcelo Jacques     ENT/Resp  Hodgkin's lymphoma in the past 2011 and had chemotherapy for this and followed with CT scan and reportedly this is gone.      Pulmonary hypertension in the past.   Fluticasone flonase 50 mcg/act nasal spray  Loratadine claritin 10mg    CT of lung next      Hearing. Feels he has wax in his left ear and partial problem with the right ear. It is variable problem. Has used ear wax removal.      Feeding tube has helped him gain 20 lbs - he gets feedings 1 hour after medications so 9am, 3pm and 9pm     Skin/Cancer/Seborrhea/other  No skin cancer     Musculoskeletal/Pain/Headache  bilateral knee replacements.     Other:  He  reports a neuropathy and that his foot drop has resolved. 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    No constipation - bowels are a bit loose  Getting feedings - using nestle product  He has reflux -  Has a pillow but not a pillow wedge  He has to brush his teeth several times daily  Avoiding water pik because of difficulty handling secretions   Teeth gets crusty - tartar     May want to talk with his pcp about the omeprazole - should he be taking a different product or/and higher dose  Famotidine (pepcid) or Pantoprazole (protonix)     Overall his parkinson is stable  No falls  Refilled his sinemet that is [facility]     Ongoing nocturia - had tried medication in the past that did not help  Not sure if he has been on mirabegron/myrbetriq, ubegron/gemtesa  Has seen Dr. Hawley jan 2023   Benign prostatic hyperplasia with urinary retention  (primary encounter diagnosis)  Comment: Bladder scan showed minimal residual urine today.  (N45.1) Epididymitis     Blood pressure has been stable  Anticoagulated   Using flecainide for his heart     Ongoing hematological care with Dr. JUNIOR      Medications      8a   2p  3p 8p  9p   Acetaminophen tylenol 500mg no             Amoxicillin amoxil 500mg dentist             Calcium carbonate tums 500mg no             Carbidopa/levodopa Sinemet 25/100 1    1   Crush 1     Flecainide tambocor 50mg 1       1     Fluticasone flonase 50 mcg/act spray prn             Loratadine claritin 10mg  1             Metoprolol tartrate 25mg  1/2 po       1/2 crush     MVI centrum 1             Nonformulary feeding tube osmolyte   2 cans   2 cans   2 cans   Omeprazole prilosec 20mg 1             Prevident 5000 dry mouth using             Rosuvastatin crestor 40mg         1     Senna-docusate 8.6-50 no             Starch-maltodextrin thick-it no             Vit D3 cholecalciferol 50 mcg 2000  ?             Warfarin anticoagulant coumadin 2.5         schedule                    "      Plan:    Not exercising  No falls  He arrives today by himself  He had an ulcer left foot - was wearing a boot and was off balance and gave up on it and was healed   He has a neuropathy and had a stick that stuck there that he was unaware of - and a woman who does his toes identified it and sent him in to see his doctor    He has not noticed wearing off - taking his medication 3/day     Ordered big and loud t herapy =     Swallowing - he had some problems with tylenol caplet and coughed it up as he had problems swallowing it.   He got one tylenol down and was not able to get the other pill down  He has a lot of  \"reflux\" due to the osmolyte formula  He has some cramping with his bowels - some gas  Bowels are explosive and loose and every few days  He has some stool every day and is loose  Not well formed - more \"pelletized\"    He is urinating 4-5 times per night    He is up during the night  He has achiness of his hip - he gets up and this helps his hip pain.     He has a new cat that sleeps on his bed and wakes him up -   Feeds her before  Sleeps during the day and up at night   Cat was scared initially when they got the cat from shelter in [place]     Blood pressure has been stable  Anticoagulated   Using flecainide for his heart  Metoprolol      Angelica Rock - wife - bladder surgery and urinary issues - suprapubic catheter   James - son - working in [place] and has house in [facility]  - trying to get a local job.  Working on boost service.   Gemma - daughter - lives in  [place] and working with [employer] and immigration policy    Blood work today  Paul tomorrow virtual    Bekah Matt appointment cardiology 10/10    Covid Pizer 11/6/2023 - needs to be cancelled as got booster already     Return back 6 months.     Coding statement:   Medical Decision Making:  #  Chronic progressive medical conditions addressed  - see above --   Review and/or interpretation of unique test or " documentation from a provider outside of neurology no   Independent historian provided additional details  no I  Prescription drug management and review of potential side effects and/or monitoring for side effects  -- see above ---  Health impacted by social determinants of health  no    I have reviewed the note as documented above.  This accurately captures the substance of my conversation with the patient and total time spent preparing for visit, executing visit and completing visit on the day of the visit:  30 minutes.  The portion of this total time included face to face time 22 minutes    Ángel Hill MD     ______________________________________    Last visit date and details:             ______________________________________      Patient was asked about 14 Review of systems including changes in vision (dry eyes, double vision), hearing, heart, lungs, musculoskeletal, depression, anxiety, snoring, RBD, insomnia, urinary frequency, urinary urgency, constipation, swallowing problems, hematological, ID, allergies, skin problems: seborrhea, endocrinological: thyroid, diabetes, cholesterol; balance, weight changes, and other neurological problems and these were not significant at this time except for   Patient Active Problem List   Diagnosis    Hemochromatosis    Tutwj-uqrhxp-jqjz disease, chronic (H)    Advanced directives, counseling/discussion    Polyneuropathy    Status post total knee replacements    Status post bone marrow transplant (H)    Hyperlipidemia with target LDL less than 100    Atrial fibrillation (H)    Chronic rhinitis    Gallstones without obstruction of gallbladder    Long-term (current) use of anticoagulants [Z79.01]    Thyroid nodule    Type 2 diabetes mellitus with stage 2 chronic kidney disease, without long-term current use of insulin (H)    History of Hodgkin's lymphoma    History of irradiation, presenting hazards to health    Primary pulmonary hypertension (H)    Hereditary  hemochromatosis (H)    Oropharyngeal dysphagia    Articulation disorder    Personal history of PE (pulmonary embolism)    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)    Abnormal dopamine scan (DaTSCAN) 2020    ACP (advance care planning)    Thrombocytopenia (H)    Paroxysmal atrial fibrillation (H)    Benign prostatic hyperplasia with urinary retention    Constipation    Parkinson's disease (H)    Gastrostomy tube in place (H)    Chronic atrial fibrillation (H)    Neuropathic ulcer of toe, left, limited to breakdown of skin (H)    Hammertoe, bilateral          Allergies   Allergen Reactions    Seasonal Allergies      Past Surgical History:   Procedure Laterality Date    ANESTHESIA CARDIOVERSION  04/21/2011    Procedure:ANESTHESIA CARDIOVERSION; Surgeon:GENERIC ANESTHESIA PROVIDER; Location:[facility] OR    ARTHROSCOPY KNEE RT/LT      LT    CATARACT IOL, RT/LT  2017    COLONOSCOPY  10/16/2012    Procedure: COLONOSCOPY;  Colonoscopy, screening;  Surgeon: Reg Feliciano MD;  Location: [facility] OR    COLONOSCOPY N/A 04/14/2021    Procedure: COLONOSCOPY;  Surgeon: Reg Holm MD;  Location: [facility] GI    ESOPHAGOSCOPY, GASTROSCOPY, DUODENOSCOPY (EGD), COMBINED N/A 10/07/2020    Procedure: ESOPHAGOGASTRODUODENOSCOPY, WITH BIOPSY;  Surgeon: Raul Sawyer DO;  Location: [facility] OR    H ABLATION FOCAL AFIB  03/05/2013    PVI    H ABLATION FOCAL AFIB  01/01/2018    HC BONE MARROW/STEM TRANSPLANT ALLOGENIC  05/08/2007    INSERT PORT VASCULAR ACCESS      IR GASTROSTOMY TUBE PERCUTANEOUS PLCMNT  10/25/2021    JOINT REPLACEMTN, KNEE RT/LT  03/28/2012    SHAWN    LAPAROSCOPY DIAGNOSTIC (GENERAL) N/A 10/29/2021    Procedure: LAPAROSCOPY, DIAGNOSTIC, TAKEDOWN OF MALPOSITIONED GASTROSTOMY TUBE, INSERTION OF GASTROSTOMY TUBE, SMALL BOWEL RESECTION X1, PRIMARY REPAIR OF SMALL BOWEL ENTEROTOMY, ABDOMINAL WASHOUT, TAP BLOCK;  Surgeon: Leif Finney DO;  Location: [facility] OR    PEG TUBE PLACEMENT  10/25/2021    VASECTOMY  1990     Past  Medical History:   Diagnosis Date    Abnormal dopamine scan (DaTSCAN) 2020 11/04/2020    [phone] 11/3/2020   Narrative & Impression   Examination: Nuclear medicine DATscan for Dopamine Receptor Localization.   Examination: NM Brain Image Tomographic (SPECT) DATscan   Date: 11/3/2020 3:21 PM   Indication: Parkinsonism   Comparison: None   Additional Information: none   Interfering Medications: None   Technique:   The patient initially received 1 ml of Lugol's solution orally prior    Antiphospholipid antibody syndrome (H)     Ravi's, noted negative per testing re-done in 2008 in hematology note 01/13/2009    Articulation disorder 09/23/2020    Atrial fibrillation (H) 04/2011    Benign prostatic hyperplasia with urinary retention     Cirrhosis (H)     CKD (chronic kidney disease) stage 2, GFR 60-89 ml/min     Diabetes mellitus type II     steroid induced    DJD (degenerative joint disease) of knee     SHAWN, worse on right    DVT (deep venous thrombosis) (H)     ED (erectile dysfunction)     Gallbladder polyp 05/2010    Gimtz-Ltfxja-Pkhv Disease 2007    BMT    Hemochromatosis     Hodgkin's disease NOS     5 cycles of ABVD in 2011    HTN (hypertension)     Hyperlipidaemia LDL goal <100     Multiple thyroid nodules     benign     Myeloproliferative disorder (H)     atypical, [facility]    Parkinson disease (H) 09/24/2020    PE (pulmonary embolism) 11/2004    Polyneuropathy     Pressure ulcer     Primary pulmonary hypertension (H)     Severe protein-calorie malnutrition (H) 11/10/2021    Small bowel obstruction (H) 10/29/2021    Thrombocytopenia (H) 06/08/2021     Social History     Socioeconomic History    Marital status:      Spouse name: Angelica    Number of children: 2    Years of education: 21    Highest education level: Not on file   Occupational History    Occupation:      Employer: [employer]     Employer: DISABLED   Tobacco Use    Smoking status: Never    Smokeless tobacco: Never   Substance  and Sexual Activity    Alcohol use: No    Drug use: No    Sexual activity: Not Currently     Partners: Female     Birth control/protection: None   Other Topics Concern    Parent/sibling w/ CABG, MI or angioplasty before 65F 55M? No   Social History Narrative     about 50 yrs        Wife has some health issues - back pain - second knee replaced    She had gastric bypass and a fib and bad mitral valve        Son in [place]    Daughter in [facility] with 8 yo son.         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[employer]        Born and raised in [place]              Social Determinants of Health     Financial Resource Strain: Not on file   Food Insecurity: Not on file   Transportation Needs: Not on file   Physical Activity: Not on file   Stress: Not on file   Social Connections: Not on file   Interpersonal Safety: Not on file   Housing Stability: Not on file       Drug and lactation database from the [place] National Library of Medicine:  http://toxnet.nlm.nih.gov/cgi-bin/sis/htmlgen?LACT      B/P: Data Unavailable, T: Data Unavailable, P: Data Unavailable, R: Data Unavailable 0 lbs 0 oz  There were no vitals taken for this visit., There is no height or weight on file to calculate BMI.  Medications and Vitals not listed above were documented in the cart and reviewed by me.     Current Outpatient Medications   Medication Sig Dispense Refill    amoxicillin (AMOXIL) 500 MG capsule Take 2,000 mg by mouth once Take 1 hour prior to dental procedure      carbidopa-levodopa (SINEMET)  MG tablet 1 tab 3/day at 8am, 2pm and 8pm 270 tablet 3    flecainide (TAMBOCOR) 50 MG tablet TAKE 1 TABLET TWICE A  tablet 2    loratadine (CLARITIN REDITABS) 10 MG ODT Take 10 mg by mouth daily      metoprolol tartrate (LOPRESSOR) 25 MG tablet 0.5 tablets (12.5 mg) by Oral or Feeding Tube route 2 times daily (crush tablet) 90 tablet 3    NONFORMULARY Abbott osmolyte feedings 2 cans 3/day      omeprazole  (PRILOSEC) 20 MG DR capsule TAKE 1 CAPSULE BY MOUTH EVERY MORNING. 90 capsule 0    PREVIDENT 5000 DRY MOUTH 1.1 % GEL topical gel Apply to affected area daily as needed Or brushes with water  3    rosuvastatin (CRESTOR) 40 MG tablet Take 1 tablet (40 mg) by mouth At Bedtime 90 tablet 3    warfarin ANTICOAGULANT (COUMADIN) 2.5 MG tablet TAKE 1 TABLET (2.5 MG) by mouth every Tue, Fri; and take ONE AND ONE-HALF TABLETS (3.75 MG) ALL OTHER DAYS OR AS DIRECTED BY ANTICOAGULATION CLINIC 180 tablet 1         Ángel Hill MD

## 2023-09-22 ENCOUNTER — DOCUMENTATION ONLY (OUTPATIENT)
Dept: ANTICOAGULATION | Facility: CLINIC | Age: 73
End: 2023-09-22
Payer: COMMERCIAL

## 2023-09-22 DIAGNOSIS — I48.0 PAROXYSMAL ATRIAL FIBRILLATION (H): ICD-10-CM

## 2023-09-22 DIAGNOSIS — Z79.01 LONG TERM CURRENT USE OF ANTICOAGULANT THERAPY: Primary | ICD-10-CM

## 2023-09-22 DIAGNOSIS — I48.20 CHRONIC ATRIAL FIBRILLATION (H): ICD-10-CM

## 2023-09-22 DIAGNOSIS — Z86.711 PERSONAL HISTORY OF PE (PULMONARY EMBOLISM): ICD-10-CM

## 2023-09-22 LAB — INR HOME MONITORING: 3 RATIO (ref 2–3)

## 2023-09-22 NOTE — PROGRESS NOTES
ANTICOAGULATION  MANAGEMENT-Home Monitor Managed by Exception    Haresh EMILIE Rock 73 year old male is on warfarin with therapeutic INR result. (Goal INR 2.0-3.0)    Recent labs: (last 7 days)     09/22/23  1615   INR 3       Previous INR was Therapeutic  Medication, diet, health changes since last INR:chart reviewed; none identified  Contacted within the last 12 weeks by phone on 9/8/23      MINNIE Hutson was NOT contacted regarding therapeutic result today per home monitoring policy manage by exception agreement.   Current warfarin dose is to be continued:     Summary  As of 9/22/2023      Full warfarin instructions:  2.5 mg every Tue, Fri; 3.75 mg all other days   Next INR check:  9/29/2023             ?   Shanice Dhillon RN  Anticoagulation Clinic  9/22/2023    _______________________________________________________________________     Anticoagulation Episode Summary       Current INR goal:  2.0-3.0   TTR:  88.3 % (1 y)   Target end date:  Indefinite   Send INR reminders to:  [facility]    Indications    Long-term (current) use of anticoagulants [Z79.01] [Z79.01]  Atrial fibrillation (H) [I48.91]  Antiphospholipid antibody syndrome (H) (Resolved) [D68.61]  Personal history of DVT (deep vein thrombosis) (Resolved) [Z86.718]  Atrial fibrillation  unspecified type (H) (Resolved) [I48.91]  Paroxysmal atrial fibrillation (H) [I48.0]  Chronic atrial fibrillation (H) [I48.20]  Personal history of PE (pulmonary embolism) [Z86.711]             Comments:  JESSICA rodas to manage by exception             Anticoagulation Care Providers       Provider Role Specialty Phone number    Anya Nowak MD Referring Family Medicine [phone]    Elizabeth Balderas MD Referring Internal Medicine [phone]

## 2023-09-25 ENCOUNTER — PATIENT OUTREACH (OUTPATIENT)
Dept: CARE COORDINATION | Facility: CLINIC | Age: 73
End: 2023-09-25
Payer: COMMERCIAL

## 2023-09-28 NOTE — LETTER
9/11/2023         RE: Haresh Rock  [address] [place] [address]        Dear Colleague,    Thank you for referring your patient, Haresh Rock, to the [facility]. Please see a copy of my visit note below.    FOOT AND ANKLE SURGERY/PODIATRY CONSULT NOTE        ASSESSMENT:   Neuropathic Ulceration 2nd digit left foot  Hammertoe        TREATMENT:  -I discussed with the patient that he has a full-thickness ulceration along the plantar second agent left foot at the level of the PIPJ.  No signs of infection noted on exam today.    -We reviewed recommendations for nonweightbearing on the left foot, keep the left foot clean and dry, topical wound care dressings.  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    -I referred him to [facility] for a wheelchair.  Referred for cam boot.    -Referred for ABIs    -After discussion of risk factors nursing staff removed dressing, cleansed wound and consent obtained 2% Lidocaine HCL jelly was applied, under clean conditions, the second digit left foot ulceration(s) were debrided using .into the subcutaneous tissue.  Devitalized and nonviable tissue, along with any fibrin and slough, was removed to improve granulation tissue formation, stimulate wound healing, decrease overall bacteria load, disrupt biofilm formation and decrease edge senescence. Wound drainage was scant No. Total excisional debridement was 0.36 sq cm into the subcutaneous tissue with a depth of 0.36 cm.   Ulcers were improved afterwards and .  Measures were as noted on the flow sheet. Collagen with a gauze dresssing was applied. He will continue to apply Collagen with a gauze dresssing every 4 to 5 days.      Does the patient have mobility limitation significantly impairs his/her ability to participate in one or more mobility-related activities of daily living such as toileting, dressing, grooming, and bathing in customary  locations in the home?  Yes     Can the patient's mobility limitation be sufficiently resolved by the use of an appropriately fitted cane, crutches or walker?  No   Patient cannot use a cane, crutches, or walker due to the need for non-weight bearing status.     Does the patient's home provide adequate access between rooms, maneuvering space, and surfaces for the use of a manual wheelchair?  Yes     Is the patient or caregiver able to safely use/maneuver the wheelchair and expressed willingness to use the manual wheelchair in the home on a regular basis?  Yes    Can the patient's functional mobility deficit be sufficiently resolved by the use of a manual wheelchair and significantly improve the patient's ability to participate in mobility-related activities of daily living?  Yes     Does the patient have sufficient upper extremity function and other physical and mental capabilities needed to safely propel the manual wheel chair during a typical day?  Yes    Does the patient have a caregiver who is willing, available, and is able to provide assistance with the wheelchair.  Yes     If a standard jaquelin-wheelchair is ordered does the patient require a lower seat height because of short stature or to enable the patient to place his/her feet on the ground for propulsion?  No     If a lightweight wheelchair is ordered is the patient unable to self-propel in a standard weight wheelchair and would be able to self-propel in a lightweight chair?  Yes     Does your patient require height adjustable arms because they require an arm height that is different than that available using nonadjustable arms?  No     Does your patient spend more than 2 hours per day in the wheelchair?  Yes    Does your patient require a safety belt because they have weak upper body muscles, upper body instability, muscle spasticity which requires use of this item for positioning? No      Does the beneficiary self-propel and require anti-rollback devices  because of ramps?  No     If the patient is in need of a bariatric wheelchair is their weight over 250 lbs?  No     Does the patient require elevating leg rests?   Yes   Elevating leg rests are needed due to a musculoskeletal condition or presence of a cast or brace which prevents 90 degree flexion at the knee.    -He will follow-up in 3 weeks    Yuval Hampton DPM  [facility]      HPI: Haresh Rock was seen today for an area of skin discoloration along the second toe left foot.  Patient states that he went for a pedicure recently and they recommended he be seen by foot specialist for discoloration on the plantar second edge on the left foot.  This was first discovered approximate 2 weeks ago.  He denies drainage.  Patient says he lives with his wife who he helps take care of.  Past medical history significant for peripheral neuropathy, Parkinson's.    Past Medical History:   Diagnosis Date     Abnormal dopamine scan (DaTSCAN) 2020 11/04/2020    [phone] 11/3/2020   Narrative & Impression   Examination: Nuclear medicine DATscan for Dopamine Receptor Localization.   Examination: NM Brain Image Tomographic (SPECT) DATscan   Date: 11/3/2020 3:21 PM   Indication: Parkinsonism   Comparison: None   Additional Information: none   Interfering Medications: None   Technique:   The patient initially received 1 ml of Lugol's solution orally prior     Antiphospholipid antibody syndrome (H)     Ravi's, noted negative per testing re-done in 2008 in hematology note 01/13/2009     Articulation disorder 09/23/2020     Atrial fibrillation (H) 04/2011     Benign prostatic hyperplasia with urinary retention      Cirrhosis (H)      CKD (chronic kidney disease) stage 2, GFR 60-89 ml/min      Diabetes mellitus type II     steroid induced     DJD (degenerative joint disease) of knee     SHAWN, worse on right     DVT (deep venous thrombosis) (H)      ED (erectile dysfunction)      Gallbladder polyp 05/2010      Aikfx-Waogsy-Ezey Disease 2007    BMT     Hemochromatosis      Hodgkin's disease NOS     5 cycles of ABVD in 2011     HTN (hypertension)      Hyperlipidaemia LDL goal <100      Multiple thyroid nodules     benign      Myeloproliferative disorder (H)     atypical, [facility]     Parkinson disease (H) 09/24/2020     PE (pulmonary embolism) 11/2004     Polyneuropathy      Pressure ulcer      Primary pulmonary hypertension (H)      Severe protein-calorie malnutrition (H) 11/10/2021     Small bowel obstruction (H) 10/29/2021     Thrombocytopenia (H) 06/08/2021       Past Surgical History:   Procedure Laterality Date     ANESTHESIA CARDIOVERSION  04/21/2011    Procedure:ANESTHESIA CARDIOVERSION; Surgeon:GENERIC ANESTHESIA PROVIDER; [facility] OR     ARTHROSCOPY KNEE RT/LT      LT     CATARACT IOL, RT/LT  2017     COLONOSCOPY  10/16/2012    Procedure: COLONOSCOPY;  Colonoscopy, screening;  Surgeon: Reg Feliciano MD;  Location: [facility] OR     COLONOSCOPY N/A 04/14/2021    Procedure: COLONOSCOPY;  Surgeon: Reg Holm MD;  Location: [facility] GI     ESOPHAGOSCOPY, GASTROSCOPY, DUODENOSCOPY (EGD), COMBINED N/A 10/07/2020    Procedure: ESOPHAGOGASTRODUODENOSCOPY, WITH BIOPSY;  Surgeon: Raul Sawyer DO;  Location: [facility] OR     H ABLATION FOCAL AFIB  03/05/2013    PVI     H ABLATION FOCAL AFIB  01/01/2018     HC BONE MARROW/STEM TRANSPLANT ALLOGENIC  05/08/2007     INSERT PORT VASCULAR ACCESS       IR GASTROSTOMY TUBE PERCUTANEOUS PLCMNT  10/25/2021     JOINT REPLACEMTN, KNEE RT/LT  03/28/2012    SHAWN     LAPAROSCOPY DIAGNOSTIC (GENERAL) N/A 10/29/2021    Procedure: LAPAROSCOPY, DIAGNOSTIC, TAKEDOWN OF MALPOSITIONED GASTROSTOMY TUBE, INSERTION OF GASTROSTOMY TUBE, SMALL BOWEL RESECTION X1, PRIMARY REPAIR OF SMALL BOWEL ENTEROTOMY, ABDOMINAL WASHOUT, TAP BLOCK;  Surgeon: Leif Finney DO;  Location: [facility] OR     PEG TUBE PLACEMENT  10/25/2021     VASECTOMY  1990        Allergies   Allergen Reactions     Seasonal  Allergies          Current Outpatient Medications:      amoxicillin (AMOXIL) 500 MG capsule, Take 2,000 mg by mouth once Take 1 hour prior to dental procedure, Disp: , Rfl:      carbidopa-levodopa (SINEMET)  MG tablet, 1 tab 3/day at 8am, 2pm and 8pm, Disp: 270 tablet, Rfl: 3     flecainide (TAMBOCOR) 50 MG tablet, TAKE 1 TABLET TWICE A DAY, Disp: 180 tablet, Rfl: 2     loratadine (CLARITIN REDITABS) 10 MG ODT, Take 10 mg by mouth daily, Disp: , Rfl:      metoprolol tartrate (LOPRESSOR) 25 MG tablet, 0.5 tablets (12.5 mg) by Oral or Feeding Tube route 2 times daily (crush tablet), Disp: 90 tablet, Rfl: 3     NONFORMULARY, Abbott osmolyte feedings 2 cans 3/day, Disp: , Rfl:      omeprazole (PRILOSEC) 20 MG DR capsule, TAKE 1 CAPSULE BY MOUTH EVERY MORNING., Disp: 90 capsule, Rfl: 0     PREVIDENT 5000 DRY MOUTH 1.1 % GEL topical gel, Apply to affected area daily as needed Or brushes with water, Disp: , Rfl: 3     rosuvastatin (CRESTOR) 40 MG tablet, Take 1 tablet (40 mg) by mouth At Bedtime, Disp: 90 tablet, Rfl: 3     warfarin ANTICOAGULANT (COUMADIN) 2.5 MG tablet, TAKE 1 TABLET (2.5 MG) by mouth every Tue, Fri; and take ONE AND ONE-HALF TABLETS (3.75 MG) ALL OTHER DAYS OR AS DIRECTED BY ANTICOAGULATION CLINIC, Disp: 180 tablet, Rfl: 1    Social History     Social History Narrative     about 48 yrs        Wife has some health issues - back pain - second knee replaced    She had gastric bypass and a fib and bad mitral valve        Son in [place]    Daughter in [facility] with 10 yo son.         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[employer]        Born and raised in [facility]                Family History   Problem Relation Age of Onset     Hypertension Mother      Cerebrovascular Disease Mother      Breast Cancer Mother      Alzheimer Disease Father      Arrhythmia Sister         supraventricular tachycardia     Thyroid Disease Sister      Pneumonia Sister         covid19     Other - See  "Comments Sister         lives in [place]     Arrhythmia Brother      Prostate Cancer Brother      Other - See Comments Brother         [place]     Cancer Brother         lymphome or bone cancer     Gastrointestinal Disease Maternal Grandmother         colitis      Thyroid Cancer Daughter         had thyroid removed     Bipolar Disorder Daughter      Other - See Comments Daughter         lives in [place] - single with one son 9  yrs     Thyroid Disease Daughter      Other - See Comments Son         lives in [place]. no kids and not .     Obsessive Compulsive Disorder Son      Depression Son      Eating Disorder Son         eats when depressed     Obesity Son      Diabetes Paternal Aunt      Parkinsonism Other         2 cousins with parkinson     C.A.D. No family hx of        Review of Systems - 10 point Review of Systems is negative except for ulceration second digit left foot which is noted in HPI.      OBJECTIVE:  Appearance: alert, well appearing, and in no distress.    /68   Pulse 59   Ht 1.803 m (5' 11\")   Wt 78 kg (172 lb)   SpO2 100%   BMI 23.99 kg/m      BMI= Body mass index is 23.99 kg/m .    General appearance: Patient is alert and fully cooperative with history & exam.  No sign of distress is noted during the visit.     Psychiatric: Affect is pleasant & appropriate.  Patient appears motivated to improve health.     Respiratory: Breathing is regular & unlabored while sitting.     HEENT: Hearing is intact to spoken word.  Speech is clear.  No gross evidence of visual impairment that would impact ambulation.    Vascular: Dorsalis pedis palpableBilateral.  Dermatologic:   Gastrostomy/Enterostomy Gastrostomy LUQ 1 18 fr (Active)       Incision/Surgical Site 10/30/21 Right;Upper Abdomen (Active)       Incision/Surgical Site 10/30/21 Left;Upper Abdomen (Active)       Incision/Surgical Site 10/30/21 Upper;Midline Abdomen (Active)       Incision/Surgical Site 10/30/21 Umbilicus " (Active)   Ulceration plantar PIPJ 2nd digit left foot 0.6x0.6x0.3cm, granular base. No erythema bilateral.   Neurologic: Diminished to light touch Bilateral.  Musculoskeletal: Contracted digits noted Bilateral.        Again, thank you for allowing me to participate in the care of your patient.        Sincerely,        Yuval Hampton DPM   Temples.../Clavicles.../Shoulders.../Scapula...

## 2023-09-29 ENCOUNTER — TRANSFERRED RECORDS (OUTPATIENT)
Dept: HEALTH INFORMATION MANAGEMENT | Facility: CLINIC | Age: 73
End: 2023-09-29
Payer: COMMERCIAL

## 2023-09-29 ENCOUNTER — DOCUMENTATION ONLY (OUTPATIENT)
Dept: ANTICOAGULATION | Facility: CLINIC | Age: 73
End: 2023-09-29
Payer: COMMERCIAL

## 2023-09-29 DIAGNOSIS — Z86.711 PERSONAL HISTORY OF PE (PULMONARY EMBOLISM): ICD-10-CM

## 2023-09-29 DIAGNOSIS — I48.0 PAROXYSMAL ATRIAL FIBRILLATION (H): ICD-10-CM

## 2023-09-29 DIAGNOSIS — Z79.01 LONG TERM CURRENT USE OF ANTICOAGULANT THERAPY: Primary | ICD-10-CM

## 2023-09-29 DIAGNOSIS — I48.20 CHRONIC ATRIAL FIBRILLATION (H): ICD-10-CM

## 2023-09-29 LAB — INR HOME MONITORING: 2.7 RATIO (ref 2–3)

## 2023-09-29 NOTE — PROGRESS NOTES
ANTICOAGULATION  MANAGEMENT-Home Monitor Managed by Exception    Haresh EMILIE Rock 73 year old male is on warfarin with therapeutic INR result. (Goal INR 2.0-3.0)    Recent labs: (last 7 days)     09/29/23  1350   INR 2.7       Previous INR was Therapeutic  Medication, diet, health changes since last INR:chart reviewed; none identified  Contacted within the last 12 weeks by phone on 9/8/23      MINNIE Hutson was NOT contacted regarding therapeutic result today per home monitoring policy manage by exception agreement.   Current warfarin dose is to be continued:     Summary  As of 9/29/2023      Full warfarin instructions:  2.5 mg every Tue, Fri; 3.75 mg all other days   Next INR check:  10/6/2023             ?   Shanice Dhillon RN  Anticoagulation Clinic  9/29/2023    _______________________________________________________________________     Anticoagulation Episode Summary       Current INR goal:  2.0-3.0   TTR:  88.3 % (1 y)   Target end date:  Indefinite   Send INR reminders to:  [facility]    Indications    Long-term (current) use of anticoagulants [Z79.01] [Z79.01]  Atrial fibrillation (H) [I48.91]  Antiphospholipid antibody syndrome (H) (Resolved) [D68.61]  Personal history of DVT (deep vein thrombosis) (Resolved) [Z86.718]  Atrial fibrillation  unspecified type (H) (Resolved) [I48.91]  Paroxysmal atrial fibrillation (H) [I48.0]  Chronic atrial fibrillation (H) [I48.20]  Personal history of PE (pulmonary embolism) [Z86.711]             Comments:  JESSICA rodas to manage by exception             Anticoagulation Care Providers       Provider Role Specialty Phone number    Anya Nowak MD Referring Family Medicine [phone]    Elizabeth Balderas MD Referring Internal Medicine [phone]

## 2023-10-03 NOTE — PROGRESS NOTES
ANTICOAGULATION FOLLOW-UP CLINIC VISIT    Patient Name:  Haresh Rock  Date:  8/29/2017  Contact Type:  Face to Face    SUBJECTIVE:     Patient Findings     Positives No Problem Findings, Unexplained INR or factor level change           OBJECTIVE    INR Protime   Date Value Ref Range Status   08/29/2017 3.3 (A) 0.86 - 1.14 Final     Factor 2 Assay   Date Value Ref Range Status   05/01/2007 58 (L) 60 - 140 % Final     Comment:     (Note)  CALLED TO TAMARA(INTERV. RADIOLOGY)[id number],        ASSESSMENT / PLAN  No question data found.  Anticoagulation Summary as of 8/29/2017     INR goal 2.0-3.0   Today's INR 3.3!   Maintenance plan 5 mg (5 mg x 1) on Tue, Thu; 2.5 mg (5 mg x 0.5) all other days   Full instructions 5 mg on Tue, Thu; 2.5 mg all other days   Weekly total 22.5 mg   No change documented Laverne Ferguson RN   Plan last modified Belinda Francis RN (4/19/2016)   Next INR check 10/10/2017   Priority INR   Target end date Indefinite    Indications   Long-term (current) use of anticoagulants [Z79.01] [Z79.01]  Atrial fibrillation (H) [I48.91]         Anticoagulation Episode Summary     INR check location     Preferred lab     Send INR reminders to [facility]    Comments       Anticoagulation Care Providers     Provider Role Specialty Phone number    Anya Nowak MD [facility] [phone]            See the Encounter Report to view Anticoagulation Flowsheet and Dosing Calendar (Go to Encounters tab in chart review, and find the Anticoagulation Therapy Visit)    Dosage adjustment made based on physician directed care plan.    Laverne Ferguson, KOSTAS                [facility]alt[facility] Counseling: I discussed with the patient the risks of ixekizumab including but not limited to immunosuppression, serious infections, worsening of inflammatory bowel disease and drug reactions.  The patient understands that monitoring is required including a PPD at baseline and must alert us or the primary physician if symptoms of infection or other concerning signs are noted.

## 2023-10-04 ENCOUNTER — OFFICE VISIT (OUTPATIENT)
Dept: VASCULAR SURGERY | Facility: CLINIC | Age: 73
End: 2023-10-04
Attending: PODIATRIST
Payer: COMMERCIAL

## 2023-10-04 ENCOUNTER — ANCILLARY PROCEDURE (OUTPATIENT)
Dept: VASCULAR ULTRASOUND | Facility: CLINIC | Age: 73
End: 2023-10-04
Attending: PODIATRIST
Payer: COMMERCIAL

## 2023-10-04 VITALS
SYSTOLIC BLOOD PRESSURE: 113 MMHG | OXYGEN SATURATION: 99 % | DIASTOLIC BLOOD PRESSURE: 66 MMHG | TEMPERATURE: 98 F | HEART RATE: 64 BPM

## 2023-10-04 DIAGNOSIS — I73.9 PAD (PERIPHERAL ARTERY DISEASE) (H): ICD-10-CM

## 2023-10-04 DIAGNOSIS — L97.521 NEUROPATHIC ULCER OF TOE, LEFT, LIMITED TO BREAKDOWN OF SKIN (H): Primary | ICD-10-CM

## 2023-10-04 PROCEDURE — 99212 OFFICE O/P EST SF 10 MIN: CPT | Performed by: PODIATRIST

## 2023-10-04 PROCEDURE — 93925 LOWER EXTREMITY STUDY: CPT

## 2023-10-04 PROCEDURE — 93923 UPR/LXTR ART STDY 3+ LVLS: CPT

## 2023-10-04 PROCEDURE — G0463 HOSPITAL OUTPT CLINIC VISIT: HCPCS | Mod: 25 | Performed by: PODIATRIST

## 2023-10-04 ASSESSMENT — PAIN SCALES - GENERAL: PAINLEVEL: NO PAIN (1)

## 2023-10-04 NOTE — PROGRESS NOTES
FOOT AND ANKLE SURGERY/PODIATRY Progress Note      ASSESSMENT:   Neuropathic Ulceration 2nd digit left foot  Hammertoe      TREATMENT:  -The 2nd digit left foot ulceration has resolved.     -We discussed that due to his anatomy, there is a pressure point which will continue to build callus tissue. If the callus tissue becomes too thick, skin breakdown will likely occur.     -I recommend use of a pumice stone x2-3 per week to remove callus tissue. I have asked that he return for sharp debridement of the callus prn.     -He is discharged from my care at this time but encouraged to return as concerns develop.     Yuval Hampton DPM  [facility]      HPI: Haresh Rock was seen again today for an ulceration on the second digit left foot.  Patient is doing well today and has been applying endoform as directed.    Past Medical History:   Diagnosis Date    Abnormal dopamine scan (DaTSCAN) 2020 11/04/2020    [phone] 11/3/2020   Narrative & Impression   Examination: Nuclear medicine DATscan for Dopamine Receptor Localization.   Examination: NM Brain Image Tomographic (SPECT) DATscan   Date: 11/3/2020 3:21 PM   Indication: Parkinsonism   Comparison: None   Additional Information: none   Interfering Medications: None   Technique:   The patient initially received 1 ml of Lugol's solution orally prior    Antiphospholipid antibody syndrome (H24)     Ravi's, noted negative per testing re-done in 2008 in hematology note 01/13/2009    Articulation disorder 09/23/2020    Atrial fibrillation (H) 04/2011    Benign prostatic hyperplasia with urinary retention     Cirrhosis (H)     CKD (chronic kidney disease) stage 2, GFR 60-89 ml/min     Diabetes mellitus type II     steroid induced    DJD (degenerative joint disease) of knee     SHAWN, worse on right    DVT (deep venous thrombosis) (H)     ED (erectile dysfunction)     Gallbladder polyp 05/2010    Aekpl-Cvcrmp-Eiza Disease 2007    BMT    Hemochromatosis      Hodgkin's disease NOS     5 cycles of ABVD in 2011    HTN (hypertension)     Hyperlipidaemia LDL goal <100     Multiple thyroid nodules     benign     Myeloproliferative disorder (H)     atypical, [facility]    Parkinson disease 09/24/2020    PE (pulmonary embolism) 11/2004    Polyneuropathy     Pressure ulcer     Primary pulmonary hypertension (H)     Severe protein-calorie malnutrition (H24) 11/10/2021    Small bowel obstruction (H) 10/29/2021    Thrombocytopenia (H24) 06/08/2021       Past Surgical History:   Procedure Laterality Date    ANESTHESIA CARDIOVERSION  04/21/2011    Procedure:ANESTHESIA CARDIOVERSION; Surgeon:GENERIC ANESTHESIA PROVIDER; Location:[facility] OR    ARTHROSCOPY KNEE RT/LT      LT    CATARACT IOL, RT/LT  2017    COLONOSCOPY  10/16/2012    Procedure: COLONOSCOPY;  Colonoscopy, screening;  Surgeon: Reg Feliciano MD;  Location: [facility] OR    COLONOSCOPY N/A 04/14/2021    Procedure: COLONOSCOPY;  Surgeon: Reg Holm MD;  Location: [facility] GI    ESOPHAGOSCOPY, GASTROSCOPY, DUODENOSCOPY (EGD), COMBINED N/A 10/07/2020    Procedure: ESOPHAGOGASTRODUODENOSCOPY, WITH BIOPSY;  Surgeon: Raul Sawyer DO;  Location: [facility] OR    H ABLATION FOCAL AFIB  03/05/2013    PVI    H ABLATION FOCAL AFIB  01/01/2018    HC BONE MARROW/STEM TRANSPLANT ALLOGENIC  05/08/2007    INSERT PORT VASCULAR ACCESS      IR GASTROSTOMY TUBE PERCUTANEOUS PLCMNT  10/25/2021    JOINT REPLACEMTN, KNEE RT/LT  03/28/2012    SHAWN    LAPAROSCOPY DIAGNOSTIC (GENERAL) N/A 10/29/2021    Procedure: LAPAROSCOPY, DIAGNOSTIC, TAKEDOWN OF MALPOSITIONED GASTROSTOMY TUBE, INSERTION OF GASTROSTOMY TUBE, SMALL BOWEL RESECTION X1, PRIMARY REPAIR OF SMALL BOWEL ENTEROTOMY, ABDOMINAL WASHOUT, TAP BLOCK;  Surgeon: Leif Finney DO;  Location: [facility] OR    PEG TUBE PLACEMENT  10/25/2021    VASECTOMY  1990       Allergies   Allergen Reactions    Seasonal Allergies          Current Outpatient Medications:     amoxicillin (AMOXIL) 500 MG capsule,  Take 2,000 mg by mouth once Take 1 hour prior to dental procedure, Disp: , Rfl:     carbidopa-levodopa (SINEMET)  MG tablet, 1 tab 3/day at 8am, 2pm and 8pm, Disp: 270 tablet, Rfl: 3    flecainide (TAMBOCOR) 50 MG tablet, TAKE 1 TABLET TWICE A DAY, Disp: 180 tablet, Rfl: 2    loratadine (CLARITIN REDITABS) 10 MG ODT, Take 10 mg by mouth daily, Disp: , Rfl:     metoprolol tartrate (LOPRESSOR) 25 MG tablet, 0.5 tablets (12.5 mg) by Oral or Feeding Tube route 2 times daily (crush tablet), Disp: 90 tablet, Rfl: 3    NONFORMULARY, Abbott osmolyte feedings 2 cans 3/day, Disp: , Rfl:     omeprazole (PRILOSEC) 20 MG DR capsule, TAKE 1 CAPSULE BY MOUTH EVERY MORNING., Disp: 90 capsule, Rfl: 0    PREVIDENT 5000 DRY MOUTH 1.1 % GEL topical gel, Apply to affected area daily as needed Or brushes with water, Disp: , Rfl: 3    rosuvastatin (CRESTOR) 40 MG tablet, Take 1 tablet (40 mg) by mouth At Bedtime, Disp: 90 tablet, Rfl: 3    warfarin ANTICOAGULANT (COUMADIN) 2.5 MG tablet, TAKE 1 TABLET (2.5 MG) by mouth every Tue, Fri; and take ONE AND ONE-HALF TABLETS (3.75 MG) ALL OTHER DAYS OR AS DIRECTED BY ANTICOAGULATION CLINIC, Disp: 180 tablet, Rfl: 1    Review of Systems - 10 point Review of Systems is negative except for ulceration second digit left foot which is noted in HPI.      OBJECTIVE:  /66   Pulse 64   Temp 98  F (36.7  C)   SpO2 99%   General appearance: Patient is alert and fully cooperative with history & exam.  No sign of distress is noted during the visit.    Vascular: Dorsalis pedis palpableLeft.  Dermatologic:    Gastrostomy/Enterostomy Gastrostomy LUQ 1 18 fr (Active)       VASC Wound 2nd toe left foot (Active)   Post Size Length 0 10/04/23 0900   Post Size Width 0 10/04/23 0900   Post Size Depth 0 10/04/23 0900   Post Total Sq cm 0 10/04/23 0900       Incision/Surgical Site 10/30/21 Right;Upper Abdomen (Active)       Incision/Surgical Site 10/30/21 Left;Upper Abdomen (Active)        Incision/Surgical Site 10/30/21 Upper;Midline Abdomen (Active)       Incision/Surgical Site 10/30/21 Umbilicus (Active)     Neurologic: Diminished to light touch Left.  Musculoskeletal: Contracted digits noted Left.      Picture:

## 2023-10-04 NOTE — PATIENT INSTRUCTIONS
OK TO START WEARING REGULAR SHOES.     .Your doctor recommends you use a pumice stone to get rid of your callous. You can purchase a pumice stone at your local drug store.    How to use your Pumice Stone        1. Soak your calloused skin in warm water. The most common part of the body to exfoliate with a pumice stone is the feet. Heels tend to develop a layer of hard, calloused skin that can become cracked or scaled. Soak the calloused body part in warm water for about five minutes to soften the skin.    2. Wait until your dry skin has softened. The skin will be easier to remove if it's soft and supple. Feel your skin after several minutes of soaking. If it still feels tough, wait a few more minutes (giving the water a warm-up if necessary). If it's soft, your skin is ready for the pumice stone.     3. Wet the stone. Wetting the stone will help it slide more easily across your skin, rather than catching on it. Run the stone under warm water, or dip it in the water where you're soaking your skin, in order to thoroughly wet it.    4. Rub it gently over the calloused area. Use a circular motion to start sloughing away the dead skin with the pumice stone. If the skin is nice and soft, it should start coming right off. Keep going until you remove the dead skin and get to the fresh, supple skin underneath.   Don't press too hard. Light pressure is all that is needed; let the surface of the stone do the work.   If you're working on your feet, focus on the heels, the sides of your toes, and other areas where dry skin tends to build up.    5. Rinse and repeat. Rinse off the dead skin and take a look to see if you need to keep going. If you still see bits of dead skin, go over the area again with the pumice stone. Continue using the stone on the area until you're satisfied with the results.   Since the pumice stone will wear down slightly while you use it, you may need to turn it over to get a fresh surface you can use to  exfoliate your skin.   Rinse the pumice stone often to keep its surface clean and effective.    6. Dry and moisturize your skin. When you're finished, use a towel to pat your skin dry. Coat the area with an oil or cream to prevent it from drying out too quickly. Your formerly calloused skin should now be soft, supple and gleaming.   Coconut oil, almond oil, or body lotion are all fine to use to condition your skin after pumicing.   Repeat as often as needed to keep your skin in good shape.    CARING FOR YOUR PUMICE STONE:    1. Scrub it after use. Dead skin will build up in the pores of the stone as you use it, so you'll want to clean the stone after use. Use a scrub brush to scrub the stone while holding it under running water. Add a bit of soap to help clean the stone completely. This way your stone will be clean and ready to use next time you need it.     2. Allow it to completely dry out. Set the pumice stone in a dry place so that it doesn't stay damp in between uses. Some pumice stones come with a string attached that allows you to hang the stone to dry. If you let the stone stay wet, bacteria could grow in the pores, making it unsafe to use.     3. Boil it if necessary. Every once in a while, you'll want to give the stone a deep cleaning to make sure it isn't harboring bacteria. Bring a small pot of water to a full boil, drop in the stone, and boil it for five minutes. Use tongs to remove the stone from the water and allow it to dry completely before storing.   If you use the stone frequently, boil it every two weeks to ensure it stays clean.   If you'd like, you can add a capful of bleach to the water to be certain all the bacteria is killed.    4. Replace the stone when it wears down. Pumice is a soft stone that will eventually wear away after you've used it for awhile. When it gets too small to handle easily, or the surface becomes too smooth to be effective, purchase a new one. Pumice stones are  inexpensive and can be found at any store that sells beauty supplies.

## 2023-10-07 DIAGNOSIS — E55.9 VITAMIN D DEFICIENCY: ICD-10-CM

## 2023-10-07 DIAGNOSIS — G20.B2 PARKINSON'S DISEASE WITH DYSKINESIA AND FLUCTUATING MANIFESTATIONS (H): Primary | ICD-10-CM

## 2023-10-07 DIAGNOSIS — E83.110 HEREDITARY HEMOCHROMATOSIS (H): ICD-10-CM

## 2023-10-07 DIAGNOSIS — Z85.71 HISTORY OF HODGKIN'S LYMPHOMA: ICD-10-CM

## 2023-10-09 ENCOUNTER — TRANSFERRED RECORDS (OUTPATIENT)
Dept: HEALTH INFORMATION MANAGEMENT | Facility: CLINIC | Age: 73
End: 2023-10-09

## 2023-10-09 ENCOUNTER — OFFICE VISIT (OUTPATIENT)
Dept: NEUROLOGY | Facility: CLINIC | Age: 73
End: 2023-10-09
Payer: COMMERCIAL

## 2023-10-09 ENCOUNTER — PATIENT OUTREACH (OUTPATIENT)
Dept: ONCOLOGY | Facility: CLINIC | Age: 73
End: 2023-10-09

## 2023-10-09 ENCOUNTER — ANTICOAGULATION THERAPY VISIT (OUTPATIENT)
Dept: ANTICOAGULATION | Facility: CLINIC | Age: 73
End: 2023-10-09

## 2023-10-09 VITALS
WEIGHT: 172 LBS | DIASTOLIC BLOOD PRESSURE: 68 MMHG | SYSTOLIC BLOOD PRESSURE: 116 MMHG | HEIGHT: 71 IN | HEART RATE: 67 BPM | BODY MASS INDEX: 24.08 KG/M2

## 2023-10-09 DIAGNOSIS — I48.0 PAROXYSMAL ATRIAL FIBRILLATION (H): ICD-10-CM

## 2023-10-09 DIAGNOSIS — I48.20 CHRONIC ATRIAL FIBRILLATION (H): ICD-10-CM

## 2023-10-09 DIAGNOSIS — F80.0 ARTICULATION DISORDER: ICD-10-CM

## 2023-10-09 DIAGNOSIS — G20.A1 PARKINSON'S DISEASE, UNSPECIFIED WHETHER DYSKINESIA PRESENT, UNSPECIFIED WHETHER MANIFESTATIONS FLUCTUATE (H): Primary | ICD-10-CM

## 2023-10-09 DIAGNOSIS — Z86.711 PERSONAL HISTORY OF PE (PULMONARY EMBOLISM): ICD-10-CM

## 2023-10-09 DIAGNOSIS — I48.91 ATRIAL FIBRILLATION (H): ICD-10-CM

## 2023-10-09 DIAGNOSIS — R49.9 VOICE DISORDER: ICD-10-CM

## 2023-10-09 DIAGNOSIS — Z79.01 LONG TERM CURRENT USE OF ANTICOAGULANT THERAPY: Primary | ICD-10-CM

## 2023-10-09 LAB — INR HOME MONITORING: 3.9 RATIO (ref 2–3)

## 2023-10-09 PROCEDURE — 99214 OFFICE O/P EST MOD 30 MIN: CPT | Performed by: PSYCHIATRY & NEUROLOGY

## 2023-10-09 RX ORDER — CARBIDOPA AND LEVODOPA 25; 100 MG/1; MG/1
TABLET ORAL
Qty: 270 TABLET | Refills: 3 | Status: SHIPPED | OUTPATIENT
Start: 2023-10-09 | End: 2023-10-23

## 2023-10-09 NOTE — PROGRESS NOTES
[facility]: Cancer Care                                                                                          Pt left message on Friday, 10/6, when voice mail stated author out until Monday, 10/9, requesting changing his return appt with Dr. Geovani Miller on 10/10 to virtual and change lab appt from 10/10 at [facility] [place]9 at a more local lab.  Per conversation with pt today, 10/9, he would like to have labs drawn today at either [facility] labs in [place] or [place] (since he has appt in [place] today).  Told pt author sent message to Dr. Geovani Miller to verify ok to change his appt to virtual and would get back to him later on that.  Pt requested [employer] message about tomorrow's appt.  RN warm transferred pt to Karie, clinic coordinator, to assist pt to change his lab appt.  Per Dr. Geovani Miller, ok for pt to have virtual visit.  RN sent message to Clinic Coordinator team to update schedule and send [employer] message to pt to confirm appt change to virtual.    Signature:  Bonnie Walls, KOSTAS, OCN

## 2023-10-09 NOTE — PROGRESS NOTES
ANTICOAGULATION MANAGEMENT     Haresh Rock 73 year old male is on warfarin with therapeutic INR result. (Goal INR 2.0-3.0)    Recent labs: (last 7 days)     10/09/23  1136   INR 3.95*       ASSESSMENT     Source(s): Chart Review and Patient/Caregiver Call     Warfarin doses taken: Warfarin taken as instructed  Diet: No new diet changes identified  Medication/supplement changes: None noted  New illness, injury, or hospitalization: No  Signs or symptoms of bleeding or clotting: No  Previous result: Therapeutic last 2(+) visits  Additional findings: None       PLAN     Recommended plan for no diet, medication or health factor changes affecting INR     Dosing Instructions: partial hold then decrease your warfarin dose (5.3% change) with next INR in 4 days       Summary  As of 10/9/2023      Full warfarin instructions:  10/9: 2.5 mg; Otherwise 2.5 mg every Tue, Thu, Sat; 3.75 mg all other days   Next INR check:  10/13/2023               Telephone call with Haresh who verbalizes understanding and agrees to plan    Patient to recheck with home meter    Education provided:   None required    Plan made per [facility] anticoagulation protocol    Aleksandra Rutledge, RN  Anticoagulation Clinic  10/9/2023    _______________________________________________________________________     Anticoagulation Episode Summary       Current INR goal:  2.0-3.0   TTR:  86.2 % (1 y)   Target end date:  Indefinite   Send INR reminders to:  ANTICOAG HOME MONITORING    Indications    Long-term (current) use of anticoagulants [Z79.01] [Z79.01]  Atrial fibrillation (H) [I48.91]  Antiphospholipid antibody syndrome (H24) (Resolved) [D68.61]  Personal history of DVT (deep vein thrombosis) (Resolved) [Z86.718]  Atrial fibrillation  unspecified type (H) (Resolved) [I48.91]  Paroxysmal atrial fibrillation (H) [I48.0]  Chronic atrial fibrillation (H) [I48.20]  Personal history of PE (pulmonary embolism) [Z86.711]             Comments:  JESSICA rodas to manage by  exception             Anticoagulation Care Providers       Provider Role Specialty Phone number    Anya Nowak MD Referring Family Medicine [phone]    Elizabeth Balderas MD Referring Internal Medicine [phone]

## 2023-10-09 NOTE — LETTER
10/9/2023         RE: Haresh Rock  [address] [place] [address]        Dear Colleague,    Thank you for referring your patient, Haresh Rock, to the [facility] [place]. Please see a copy of my visit note below.        Diagnosis/Summary/Recommendations:    PATIENT: Haresh Rock  73 year old male     : 1950    KEN: 2023    MRN: [id number]  [address] [place] [address]  [phone] (M)  [phone] (H)  [email or web address]     - sheyla Ramirez - son  Gemma - daughter  Sheyla Rock  [phone]     [email or web address],   [email or web address]  [email or web address]     [phone]     Sitting in a lazy boy     [phone]     Assessment:  (G20) Parkinson disease (H)  (primary encounter diagnosis)     Dopamine scan - (datscan)  11/3/2020  A presynaptic dopaminergic deficit is demonstrated bilaterally.     Review of diagnosis    parkinson  Avoidance of dopamine blockers yes  Motor complication review  -   Review of Impulse control disorders no  Review of surgical or medication options yes     Avoidance of dopamine blockers   Not ta josiane     Motor complication review   no     Review of Impulse control disorders   denies     Review of surgical or medication options   reviewed     Gait/Balance/Falls   No falls     Exercise/Therapy performed/offered   Doing shoveling snow     Cognitive/Driving   Driving - not problems  No cognitive problems - no changes        Mood   Daughter gemma with bipolar - 2 or 3 miles away   Son july with mood issues  2 or 3 miles away   He has some depression     Living with Sheyla - has ongoing back pain - getting a tooth extracted this afternoon;  Constipation, heart and bladder issues.   Constipation or diarrhea - has problems controlling her bowels     Hallucinations/delusions   No hallucinations     Sleep   Pseudoeph-doxylamine DM APAP nyquil - not using  Sleep pretty well  Nocturia 2/noc  Falls asleep during the day  Napping during  the day  Falls asleep when watching news  Falls asleep during the afternoon  Up 3/noc for bathroom   Midnight goes to bed and up at 8am  Naps during day   Not talking in sleep or snoring.  No unusual behaviors  Wakes up before 3am, 5/6a and 7am     Bladder/Renal/Prostate/Gyn/Other  Renal function.   May have stage 2 disease.      Prostate - denies problems. He has has nocturia couple times per night.   Denies elevated psa     Prostate surgery; urinating better     Dutasteride avodart 0.5mg - off   Tamsulosin flomax 0.4mg - stopped  Had low blood pressure and fluids     July 5th ED visit and got catheter  Surgery yesterday laser and should get catheter out  Dr Jose G Hawley     GI/Constipation/GERD   Swallow study 9/22/2020  1. Multiple events of deep laryngeal penetration with thinner barium consistencies with intermittent aspiration.      Gallstones but has not gallbladder removed.  Had a gallbladder attack x 1 and is not on actigall     Bisacodyl dulcolax 5mg EC tablet - not using   Calcium carbonate tums 500mg ?  Nutritional supplement - 1 bottle per day  - not using   Pantoprazole protonix 20mg - not using  Polythyelene glycol miralax - not using   Prevident 5000 dry mouth - using  Senna-docusate senexon-S senokot-S  - no  Weight loss better with feeding tube  No constipation  Has some seepage  When urinates he has some stool     Had bloating and blockage with the feeding tube placement and had emergency surgery   10/29/2021 Scan showed problems     Feeding tube has helped him gain 20 lbs - he gets feedings 1 hour after medications so 9am, 3pm and 9pm     Takes parkinson medications by mouth for the first two doses and crush the last one of the day through the feeding tube  Schedule is 8am, 2pm and 8pm     With his fecal seepage discussed the use of a bidet or adapting his toilet with one - h e has a raised toilet seat so not clear what needs to be done. Consider seeing Occupational therapy.      Formula switched  from crain to nestle  161 or so lbs        ENDO/Lipid/DM/Bone density/Thyroid  Cholesterol  Rosuvastatin crestor 40mg   Vitamin D3 cholecalciferol 50 mcg 2000 units  Had diabetes in the past from prednisone  And this may have resolved  May have a thyroid nodule.  TSH was normal.   A1c was 5.5         Cardio/heart/Hyper or Hypotensive   Flecainide tambocor 50mg twice daily  Metoprolol succinate ER Toprol XL 25mg 24 hr tablet  Blood pressure has been okay   122/63  Low bp has cleared up  MRI of heart next week     Vision/Dry Eyes/Cataracts/Glaucoma/Macular   Wears glasses  Eye - left eye has been poor since age 2 due to a lazy eye - amblyopia  Right eye post cataract surgery had an artificial lens put in and does not wear glasses till the evening         Heme/Anticoagulation/Antiplatelet/Anemia/Other  Folic acid folvite 1mg - not taking   Anticoagulated  Warfarin     Hereditary hemochromatosis gene - gene that increases his risk and has not had problems with this. Had had phlebotomies x 2 in the past.      AYUSH Torres     History of pulmonary embolii - back in 2004. This was related to blood clotting problems.   May have had a DVT. Reportedly has had antiphospholipid antibody. He is on coumadin.      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 Had a transplant from Dr. 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  No problems since then.      Graft vs host     He has high sed rate and will be seeing ID next week   crp was 12.8  Sed rate was 90  8/3/2021  Working with Marcelo Jacques     ENT/Resp  Hodgkin's lymphoma in the past 2011 and had chemotherapy for this and followed with CT scan and reportedly this is gone.      Pulmonary hypertension in the past.   Fluticasone flonase 50 mcg/act nasal spray  Loratadine claritin 10mg    CT of lung next      Hearing. Feels he has wax in his left ear and partial problem with the right ear. It is variable problem. Has used ear wax  removal.      Feeding tube has helped him gain 20 lbs - he gets feedings 1 hour after medications so 9am, 3pm and 9pm     Skin/Cancer/Seborrhea/other  No skin cancer     Musculoskeletal/Pain/Headache  bilateral knee replacements.     Other:  He reports a neuropathy and that his foot drop has resolved. 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    No constipation - bowels are a bit loose  Getting feedings - using nestle product  He has reflux -  Has a pillow but not a pillow wedge  He has to brush his teeth several times daily  Avoiding water pik because of difficulty handling secretions   Teeth gets crusty - tartar     May want to talk with his pcp about the omeprazole - should he be taking a different product or/and higher dose  Famotidine (pepcid) or Pantoprazole (protonix)     Overall his parkinson is stable  No falls  Refilled his sinemet that is from [facility]     Ongoing nocturia - had tried medication in the past that did not help  Not sure if he has been on mirabegron/myrbetriq, ubegron/gemtesa  Has seen Dr. Hawley jan 2023   Benign prostatic hyperplasia with urinary retention  (primary encounter diagnosis)  Comment: Bladder scan showed minimal residual urine today.  (N45.1) Epididymitis     Blood pressure has been stable  Anticoagulated   Using flecainide for his heart     Ongoing hematological care with Dr. JUNIOR      Medications      8a   2p  3p 8p  9p   Acetaminophen tylenol 500mg no             Amoxicillin amoxil 500mg dentist             Calcium carbonate tums 500mg no             Carbidopa/levodopa Sinemet 25/100 1    1   Crush 1     Flecainide tambocor 50mg 1       1     Fluticasone flonase 50 mcg/act spray prn             Loratadine claritin 10mg  1             Metoprolol tartrate 25mg  1/2 po       1/2 crush     MVI centrum 1             Nonformulary feeding tube osmolyte   2 cans   2 cans   2 cans   Omeprazole prilosec 20mg 1             Prevident 5000 dry mouth using           "   Rosuvastatin crestor 40mg         1     Senna-docusate 8.6-50 no             Starch-maltodextrin thick-it no             Vit D3 cholecalciferol 50 mcg 2000  ?             Warfarin anticoagulant coumadin 2.5         schedule                         Plan:    Not exercising  No falls  He arrives today by himself  He had an ulcer left foot - was wearing a boot and was off balance and gave up on it and was healed   He has a neuropathy and had a stick that stuck there that he was unaware of - and a woman who does his toes identified it and sent him in to see his doctor    He has not noticed wearing off - taking his medication 3/day     Ordered big and loud t herapy =     Swallowing - he had some problems with tylenol caplet and coughed it up as he had problems swallowing it.   He got one tylenol down and was not able to get the other pill down  He has a lot of  \"reflux\" due to the osmolyte formula  He has some cramping with his bowels - some gas  Bowels are explosive and loose and every few days  He has some stool every day and is loose  Not well formed - more \"pelletized\"    He is urinating 4-5 times per night    He is up during the night  He has achiness of his hip - he gets up and this helps his hip pain.     He has a new cat that sleeps on his bed and wakes him up -   Feeds her before  Sleeps during the day and up at night   Cat was scared initially when they got the cat from shelter in [place]     Blood pressure has been stable  Anticoagulated   Using flecainide for his heart  Metoprolol      Angelica Rock - wife - bladder surgery and urinary issues - suprapubic catheter   James - son - working in [place] and has house in [facility]  - trying to get a local job.  Working on boost service.   Gemma - daughter - lives in  [place] and working with [employer] and immigration policy    Blood work today  [place] tomorrow virtual    Bekah Matt appointment cardiology 10/10    Covid Pizer 11/6/2023 - needs " to be cancelled as got booster already     Return back 6 months.     Coding statement:   Medical Decision Making:  #  Chronic progressive medical conditions addressed  - see above --   Review and/or interpretation of unique test or documentation from a provider outside of neurology no   Independent historian provided additional details  no I  Prescription drug management and review of potential side effects and/or monitoring for side effects  -- see above ---  Health impacted by social determinants of health  no    I have reviewed the note as documented above.  This accurately captures the substance of my conversation with the patient and total time spent preparing for visit, executing visit and completing visit on the day of the visit:  30 minutes.  The portion of this total time included face to face time 22 minutes    Ángel Hill MD     ______________________________________    Last visit date and details:             ______________________________________      Patient was asked about 14 Review of systems including changes in vision (dry eyes, double vision), hearing, heart, lungs, musculoskeletal, depression, anxiety, snoring, RBD, insomnia, urinary frequency, urinary urgency, constipation, swallowing problems, hematological, ID, allergies, skin problems: seborrhea, endocrinological: thyroid, diabetes, cholesterol; balance, weight changes, and other neurological problems and these were not significant at this time except for   Patient Active Problem List   Diagnosis     Hemochromatosis     Kuuqp-bogjqo-bbbu disease, chronic (H)     Advanced directives, counseling/discussion     Polyneuropathy     Status post total knee replacements     Status post bone marrow transplant (H)     Hyperlipidemia with target LDL less than 100     Atrial fibrillation (H)     Chronic rhinitis     Gallstones without obstruction of gallbladder     Long-term (current) use of anticoagulants [Z79.01]     Thyroid nodule     Type 2 diabetes  mellitus with stage 2 chronic kidney disease, without long-term current use of insulin (H)     History of Hodgkin's lymphoma     History of irradiation, presenting hazards to health     Primary pulmonary hypertension (H)     Hereditary hemochromatosis (H)     Oropharyngeal dysphagia     Articulation disorder     Personal history of PE (pulmonary embolism)     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)     Abnormal dopamine scan (DaTSCAN) 2020     ACP (advance care planning)     Thrombocytopenia (H)     Paroxysmal atrial fibrillation (H)     Benign prostatic hyperplasia with urinary retention     Constipation     Parkinson's disease (H)     Gastrostomy tube in place (H)     Chronic atrial fibrillation (H)     Neuropathic ulcer of toe, left, limited to breakdown of skin (H)     Hammertoe, bilateral          Allergies   Allergen Reactions     Seasonal Allergies      Past Surgical History:   Procedure Laterality Date     ANESTHESIA CARDIOVERSION  04/21/2011    Procedure:ANESTHESIA CARDIOVERSION; Surgeon:GENERIC ANESTHESIA PROVIDER; Location:[facility] OR     ARTHROSCOPY KNEE RT/LT      LT     CATARACT IOL, RT/LT  2017     COLONOSCOPY  10/16/2012    Procedure: COLONOSCOPY;  Colonoscopy, screening;  Surgeon: Reg Feliciano MD;  Location: [facility] OR     COLONOSCOPY N/A 04/14/2021    Procedure: COLONOSCOPY;  Surgeon: Reg Holm MD;  Location: [facility] GI     ESOPHAGOSCOPY, GASTROSCOPY, DUODENOSCOPY (EGD), COMBINED N/A 10/07/2020    Procedure: ESOPHAGOGASTRODUODENOSCOPY, WITH BIOPSY;  Surgeon: Raul Sawyer DO;  [facility]     H ABLATION FOCAL AFIB  03/05/2013    PVI     H ABLATION FOCAL AFIB  01/01/2018     HC BONE MARROW/STEM TRANSPLANT ALLOGENIC  05/08/2007     INSERT PORT VASCULAR ACCESS       IR GASTROSTOMY TUBE PERCUTANEOUS PLCMNT  10/25/2021     JOINT REPLACEMTN, KNEE RT/LT  03/28/2012    SHAWN     LAPAROSCOPY DIAGNOSTIC (GENERAL) N/A 10/29/2021    Procedure: LAPAROSCOPY, DIAGNOSTIC, TAKEDOWN OF MALPOSITIONED  GASTROSTOMY TUBE, INSERTION OF GASTROSTOMY TUBE, SMALL BOWEL RESECTION X1, PRIMARY REPAIR OF SMALL BOWEL ENTEROTOMY, ABDOMINAL WASHOUT, TAP BLOCK;  Surgeon: Leif Finney DO;  Location: [facility] OR     PEG TUBE PLACEMENT  10/25/2021     VASECTOMY  1990     Past Medical History:   Diagnosis Date     Abnormal dopamine scan (DaTSCAN) 2020 11/04/2020    [phone] 11/3/2020   Narrative & Impression   Examination: Nuclear medicine DATscan for Dopamine Receptor Localization.   Examination: NM Brain Image Tomographic (SPECT) DATscan   Date: 11/3/2020 3:21 PM   Indication: Parkinsonism   Comparison: None   Additional Information: none   Interfering Medications: None   Technique:   The patient initially received 1 ml of Lugol's solution orally prior     Antiphospholipid antibody syndrome (H)     Ravi's, noted negative per testing re-done in 2008 in hematology note 01/13/2009     Articulation disorder 09/23/2020     Atrial fibrillation (H) 04/2011     Benign prostatic hyperplasia with urinary retention      Cirrhosis (H)      CKD (chronic kidney disease) stage 2, GFR 60-89 ml/min      Diabetes mellitus type II     steroid induced     DJD (degenerative joint disease) of knee     SHAWN, worse on right     DVT (deep venous thrombosis) (H)      ED (erectile dysfunction)      Gallbladder polyp 05/2010     Rfzwj-Aaqhsz-Dzyl Disease 2007    BMT     Hemochromatosis      Hodgkin's disease NOS     5 cycles of ABVD in 2011     HTN (hypertension)      Hyperlipidaemia LDL goal <100      Multiple thyroid nodules     benign      Myeloproliferative disorder (H)     atypical, [facility]     Parkinson disease (H) 09/24/2020     PE (pulmonary embolism) 11/2004     Polyneuropathy      Pressure ulcer      Primary pulmonary hypertension (H)      Severe protein-calorie malnutrition (H) 11/10/2021     Small bowel obstruction (H) 10/29/2021     Thrombocytopenia (H) 06/08/2021     Social History     Socioeconomic History     Marital status:       Spouse name: Angelica     Number of children: 2     Years of education: 21     Highest education level: Not on file   Occupational History     Occupation:      Employer: [employer]     Employer: DISABLED   Tobacco Use     Smoking status: Never     Smokeless tobacco: Never   Substance and Sexual Activity     Alcohol use: No     Drug use: No     Sexual activity: Not Currently     Partners: Female     Birth control/protection: None   Other Topics Concern     Parent/sibling w/ CABG, MI or angioplasty before 65F 55M? No   Social History Narrative     about 50 yrs        Wife has some health issues - back pain - second knee replaced    She had gastric bypass and a fib and bad mitral valve        Son in [place]    Daughter in [facility] with 8 yo son.         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        Born and raised in [place]              Social Determinants of Health     Financial Resource Strain: Not on file   Food Insecurity: Not on file   Transportation Needs: Not on file   Physical Activity: Not on file   Stress: Not on file   Social Connections: Not on file   Interpersonal Safety: Not on file   Housing Stability: Not on file       Drug and lactation database from the [place] National Library of Medicine:  http://toxnet.nlm.nih.gov/cgi-bin/sis/htmlgen?LACT      B/P: Data Unavailable, T: Data Unavailable, P: Data Unavailable, R: Data Unavailable 0 lbs 0 oz  There were no vitals taken for this visit., There is no height or weight on file to calculate BMI.  Medications and Vitals not listed above were documented in the cart and reviewed by me.     Current Outpatient Medications   Medication Sig Dispense Refill     amoxicillin (AMOXIL) 500 MG capsule Take 2,000 mg by mouth once Take 1 hour prior to dental procedure       carbidopa-levodopa (SINEMET)  MG tablet 1 tab 3/day at 8am, 2pm and 8pm 270 tablet 3     flecainide (TAMBOCOR) 50 MG tablet TAKE 1  TABLET TWICE A  tablet 2     loratadine (CLARITIN REDITABS) 10 MG ODT Take 10 mg by mouth daily       metoprolol tartrate (LOPRESSOR) 25 MG tablet 0.5 tablets (12.5 mg) by Oral or Feeding Tube route 2 times daily (crush tablet) 90 tablet 3     NONFORMULARY Abbott osmolyte feedings 2 cans 3/day       omeprazole (PRILOSEC) 20 MG DR capsule TAKE 1 CAPSULE BY MOUTH EVERY MORNING. 90 capsule 0     PREVIDENT 5000 DRY MOUTH 1.1 % GEL topical gel Apply to affected area daily as needed Or brushes with water  3     rosuvastatin (CRESTOR) 40 MG tablet Take 1 tablet (40 mg) by mouth At Bedtime 90 tablet 3     warfarin ANTICOAGULANT (COUMADIN) 2.5 MG tablet TAKE 1 TABLET (2.5 MG) by mouth every Tue, Fri; and take ONE AND ONE-HALF TABLETS (3.75 MG) ALL OTHER DAYS OR AS DIRECTED BY ANTICOAGULATION CLINIC 180 tablet 1         Ángel Hill MD      Again, thank you for allowing me to participate in the care of your patient.        Sincerely,        Ángel Hill MD

## 2023-10-09 NOTE — NURSING NOTE
"Haresh Rock's goals for this visit include:   Chief Complaint   Patient presents with    Parkinson    RECHECK     Parkinson's disease, carbidopa had been accidentally cancelled Rx in as of  7/10/23, will need refill // 1 Year follow u           He requests these members of his care team be copied on today's visit information: yes    PCP: Anya Nowak    Referring Provider:  No referring provider defined for this encounter.    /68   Pulse 67   Ht 1.803 m (5' 11\")   Wt 78 kg (172 lb)   BMI 23.99 kg/m      Do you need any medication refills at today's visit? No    JUAN Pritchard, CHLOÉ ([facility])      "

## 2023-10-09 NOTE — PATIENT INSTRUCTIONS
"  Medications      8a   2p  3p 8p  9p   Acetaminophen tylenol 500mg no             Amoxicillin amoxil 500mg dentist             Calcium carbonate tums 500mg no             Carbidopa/levodopa Sinemet 25/100 1    1   Crush 1     Flecainide tambocor 50mg 1       1     Fluticasone flonase 50 mcg/act spray prn             Loratadine claritin 10mg  1             Metoprolol tartrate 25mg  1/2 po       1/2 crush     MVI centrum 1             Nonformulary feeding tube osmolyte   2 cans   2 cans   2 cans   Omeprazole prilosec 20mg 1             Prevident 5000 dry mouth using             Rosuvastatin crestor 40mg         1     Senna-docusate 8.6-50 no             Starch-maltodextrin thick-it no             Vit D3 cholecalciferol 50 mcg 2000  ?             Warfarin anticoagulant coumadin 2.5         schedule                         Plan:    Not exercising  No falls  He arrives today by himself  He had an ulcer left foot - was wearing a boot and was off balance and gave up on it and was healed   He has a neuropathy and had a stick that stuck there that he was unaware of - and a woman who does his toes identified it and sent him in to see his doctor    He has not noticed wearing off - taking his medication 3/day     Ordered big and loud t herapy =     Swallowing - he had some problems with tylenol caplet and coughed it up as he had problems swallowing it.   He got one tylenol down and was not able to get the other pill down  He has a lot of  \"reflux\" due to the osmolyte formula  He has some cramping with his bowels - some gas  Bowels are explosive and loose and every few days  He has some stool every day and is loose  Not well formed - more \"pelletized\"    He is urinating 4-5 times per night    He is up during the night  He has achiness of his hip - he gets up and this helps his hip pain.     He has a new cat that sleeps on his bed and wakes him up -   Feeds her before  Sleeps during the day and up at night   Cat was scared " initially when they got the cat from shelter in [place]     Blood pressure has been stable  Anticoagulated   Using flecainide for his heart  Metoprolol      Angelica Rock - wife - bladder surgery and urinary issues - suprapubic catheter   James - son - working in [place] and has house in [facility]  - trying to get a local job.  Working on boost service.   Gemma - daughter - lives in  [place] and working with [employer] and immigration policy    Blood work today  Paul tomorrow virtual    Bekah Matt appointment cardiology 10/10    Covid Pizer 11/6/2023 - needs to be cancelled as got booster already     Return back 6 months.

## 2023-10-10 ENCOUNTER — TELEPHONE (OUTPATIENT)
Dept: FAMILY MEDICINE | Facility: CLINIC | Age: 73
End: 2023-10-10

## 2023-10-10 ENCOUNTER — VIRTUAL VISIT (OUTPATIENT)
Dept: TRANSPLANT | Facility: CLINIC | Age: 73
End: 2023-10-10
Attending: INTERNAL MEDICINE
Payer: COMMERCIAL

## 2023-10-10 ENCOUNTER — PATIENT OUTREACH (OUTPATIENT)
Dept: ONCOLOGY | Facility: CLINIC | Age: 73
End: 2023-10-10

## 2023-10-10 VITALS
DIASTOLIC BLOOD PRESSURE: 68 MMHG | BODY MASS INDEX: 24.08 KG/M2 | WEIGHT: 172 LBS | SYSTOLIC BLOOD PRESSURE: 116 MMHG | HEIGHT: 71 IN

## 2023-10-10 DIAGNOSIS — Z79.01 LONG TERM CURRENT USE OF ANTICOAGULANT THERAPY: Primary | ICD-10-CM

## 2023-10-10 DIAGNOSIS — E83.110 HEREDITARY HEMOCHROMATOSIS (H): ICD-10-CM

## 2023-10-10 DIAGNOSIS — I48.0 PAROXYSMAL ATRIAL FIBRILLATION (H): ICD-10-CM

## 2023-10-10 DIAGNOSIS — Z85.71 HISTORY OF HODGKIN'S LYMPHOMA: ICD-10-CM

## 2023-10-10 DIAGNOSIS — D47.1 MYELOPROLIFERATIVE DISORDER (H): ICD-10-CM

## 2023-10-10 DIAGNOSIS — D89.811 GRAFT-VERSUS-HOST DISEASE, CHRONIC (H): ICD-10-CM

## 2023-10-10 PROCEDURE — 99213 OFFICE O/P EST LOW 20 MIN: CPT | Mod: VID | Performed by: INTERNAL MEDICINE

## 2023-10-10 ASSESSMENT — PAIN SCALES - GENERAL: PAINLEVEL: NO PAIN (0)

## 2023-10-10 NOTE — PROGRESS NOTES
[facility]: Cancer Care                                                                                          Per communication with Debi, lab staff, [facility], they can perform CBC/d/p on tube drawn for retic count.  Will need to wait for specimen to warm up and then will run.     Spoke with pt informing him, he does not need lab draw today as author able to have CBC/d/p added to labs drawn yesterday.  RN to have [place] lab appt at ~12n cancelled for pt.  Completed learning assessment with pt.    Signature:  Bonnie Walls, KOSTAS, OCN

## 2023-10-10 NOTE — PROGRESS NOTES
Virtual Visit Details    Type of service:  Video Visit   Video Start Time: 850  Video End Time: 915    Originating Location (pt. Location): Home    Distant Location (provider location):  On-site  Platform used for Video Visit: Good Hutson [facility] for history of a JAK2 positive atypical myeloproliferative syndrome, status post nonmyeloablative allo-sib peripheral blood stem cell transplant in 2007, complicated by chronic tzdna-xraivq-uhpp disease, cirrhosis, hemochromatosis with C282Y homozygosity, pulmonary emboli and DVT as well as paroxysmal atrial fibrillation for which he is on warfarin, Hodgkin disease in 2011 s/p ABVD x 5 cycles, and a history of cardiac arrhythmias with an ablation. He was  diagnosed by Dr. Ángel Hill with Parkinson disease, with weight loss, dysphagia with aspiration, requiring G tube placement, orthostasis, urinary obstruction. Most recent CT and PET 2/26/21 were without evidence for recurrence of malignancy. Hypermetabolic activity at the anorectal junction with diffuse wall thickening was worked up by 3/17/21 flex sigmoidoscopy which showed a 4 mm polyp (removed, tubular adenoma), and erythematous mucosa that was biopsied and found superficial vascular congestion and melanosis coli without evidence for GVHD. Had full colonoscopy 4/14/21 without other findings. He had TURP with Dr. Hawley for urinary obstruction 8/9/21, as medical therapy was causing too much orthostasis.  Haresh was hospitalized in October and had a small bowel resection because of a G tube complication. G tube was replaced.     INTERVAL HISTORY  Haresh is feeling well s. He has dysphagia related to Parkinson's )He can still swallow his morning and midday pills but when he has to take many he crushes them and uses G tube instead. He does not take any food by mouth He continues to drink about 1 cup of Mountain Dew soda each day to help with feelings of dryness and cough.       He  continues to receive 3200 calories/day using his G tube. He  had a G tube replacement on Sep 5 2023.       He continues to experience polyuria and nocturia (3 or 4x/night). He has also had mild reflux at night. Haresh notices pain in his R hip when lying on it, and it is not bothersome when he is moving.He has an ulcer on 1st toe left foot.It has healed after cellulose and cleaning. His sleep has been just fair waking every 2 hours and he regularly takes 60-90 minute midday naps.       Haresh's Parkinson's disease appears to be mild. He has mild rest tremors and minimal cogwheeling in his upper extremity. These can interfere with his ability to write, but he was able to sign legibly on exam.  In terms of his lower platelets no new bruising or bleeding  He has not had a phlebotomy for a while for his iron overload  Last ferritin was 201 and iron sat 35%  No fever or chills. Weight is stable~ 173. No cough  INR long at 3.95 on warfarin 7.5mg/d Last night took 2.5mg and this morning 2.5mg     PAST MEDICAL HISTORY:  See my note from 06/2023     SOCIAL HISTORY:  See my note from 06/2023     FAMILY HISTORY:  See my note from  06/2023     REVIEW OF SYSTEMS:  A 12-point review of systems is done and is negative, except as in the HPI.   General: No fever, chills, nausea, vomiting, or abnormal fatigue.   Cardiac: No chest pain, palpitations, or lower extremity edema.  Respiratory: Cough with dry mouth, shortness of breath upon exertion  Skin: No bruising, rash, or abnormal pigmentation.   GI: Dysphagia from Parkinson's. G tube for feeding. No constipation or bleeding.  : Polyuria and nocturia. No incontinence or bleeding.  MSK: Lower back pain after sleep. R hip pain when lying on it.      PHYSICAL EXAMINATION:  By the video, he is an alert gentleman, verbal, in no acute distress.     EYES:  Grossly normal to inspection, no discharge, erythema or icterus.   SKIN:  Visible skin is clear.  No significant rash or abnormal  pigmentation.   NEUROLOGIC:  Cranial nerves seem to be intact.  Mentation and speech is appropriate for age.   PSYCHIATRIC:  Mentation appears normal.  He does not seem anxious today.      Latest Reference Range & Units 10/09/23 11:36   Sodium 135 - 145 mmol/L 133 (L)   Potassium 3.4 - 5.3 mmol/L 4.6   Chloride 98 - 107 mmol/L 98   Carbon Dioxide (CO2) 22 - 29 mmol/L 25   Urea Nitrogen 8.0 - 23.0 mg/dL 33.9 (H)   Creatinine 0.67 - 1.17 mg/dL 1.03   GFR Estimate >60 mL/min/1.73m2 76   Calcium 8.8 - 10.2 mg/dL 8.8   Anion Gap 7 - 15 mmol/L 10   Albumin 3.5 - 5.2 g/dL 3.9   Protein Total 6.4 - 8.3 g/dL 7.5   Alkaline Phosphatase 40 - 129 U/L 113   ALT 0 - 70 U/L 7   AST 0 - 45 U/L 30   Bilirubin Total <=1.2 mg/dL 1.0   CRP Inflammation <5.00 mg/L 55.40 (H)   Ferritin 31 - 409 ng/mL 124   Glucose 70 - 99 mg/dL 203 (H)   Iron 61 - 157 ug/dL 86   Iron Binding Capacity 240 - 430 ug/dL 246   Iron Sat Index 15 - 46 % 35   Lactate Dehydrogenase 0 - 250 U/L 200   Vitamin B12 232 - 1,245 pg/mL 1,485 (H)   Vitamin D, Total (25-Hydroxy) 20 - 50 ng/mL 33   % Retic 0.5 - 2.0 % 2.0   Absolute Retic 0.025 - 0.095 10e6/uL 0.096 (H)   Sed Rate 0 - 20 mm/hr 54 (H)   INR 0.85 - 1.15  3.95 (H)   (L): Data is abnormally low  (H): Data is abnormally high   Latest Reference Range & Units 10/09/23 11:36   WBC 4.0 - 11.0 10e3/uL 8.7   Hemoglobin 13.3 - 17.7 g/dL 16.6   Hematocrit 40.0 - 53.0 % 47.0   Platelet Count 150 - 450 10e3/uL 93 (L)   RBC Count 4.40 - 5.90 10e6/uL 4.82   MCV 78 - 100 fL 98   MCH 26.5 - 33.0 pg 34.4 (H)   MCHC 31.5 - 36.5 g/dL 35.3   RDW 10.0 - 15.0 % 11.9   % Neutrophils % 63   % Lymphocytes % 25   % Monocytes % 10   % Eosinophils % 1   % Basophils % 1   Absolute Basophils 0.0 - 0.2 10e3/uL 0.0   Absolute Eosinophils 0.0 - 0.7 10e3/uL 0.0   Absolute Immature Granulocytes <=0.4 10e3/uL 0.0   Absolute Lymphocytes 0.8 - 5.3 10e3/uL 2.2   Absolute Monocytes 0.0 - 1.3 10e3/uL 0.9   % Immature Granulocytes % 0   Absolute  Neutrophils 1.6 - 8.3 10e3/uL 5.6   Absolute NRBCs 10e3/uL 0.0   NRBCs per 100 WBC <1 /100 0   % Retic 0.5 - 2.0 % 2.0   Absolute Retic 0.025 - 0.095 10e6/uL 0.096 (H)   Sed Rate 0 - 20 mm/hr 54 (H)   (L): Data is abnormally low  (H): Data is abnormally high  ASSESSMENT:     1.  Myeloproliferative syndrome/MDS. STEVIE 2 positive  2.  Status post non-myeloablative allo-sib peripheral blood stem cell transplant 2007  3.  History of chronic gqnmq-zsfsrk-hwat disease.   4.  History of Hodgkin's disease 2011 s/p ABVD x 5 cycles.   5.  History of cardiac arrhythmias, SVT and V tach.   6.  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   7.  History of cirrhosis.   8.  History of pulmonary emboli.   9.  History of elevated hemoglobin A1c.   10. Thyroid nodule.    11. Cholelithiasis.     12. Diverticulosis.   13. Anticoagulation with warfarin for history of DVT, PE, and paroxysmal atrial fibrillation  14. Status post bilateral knee replacement.     15. Aortic stenosis  16. 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  17. Seborrheic keratosis of the back.  18. Weight loss, dysphagia, anorexia related to Parkinson's  19. Parkinson's  20. 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 Chronic aspiration related to dysphagia  22. Thrombocytopenia  23. GERD  24. Elevated CRP     ASSESSMENT:    Haresh's platelet count  is stable at 93. Not sure why the CRP is so high. He has maintained his weight since Apr 2023 (173lbs today). I wonder if he has portal hypertension and splenic sequestration due to previous cirrhosis.  Have not  done a doppler of portal vein for quite a [place] did  receive COVID and flu vaccine last week.Will not do a phlebotomy at this time Continue warfarin, INR 3.95 and will adjust with Coumadin clinic today     I will see Haresh again in 4 months.     I spent a total of 25 minutes face to face with Haresh Rock during today's office visit. Over 50% of  this time was spent counseling the patient and coordinating the care regarding Hodgkins and BMT and 10 minutes preparing to see the patient and  care coordination      Augusto Miller MD  [phone]

## 2023-10-10 NOTE — NURSING NOTE
Is the patient currently in the state of [place]? YES    Visit mode:VIDEO    If the visit is dropped, the patient can be reconnected by: VIDEO VISIT: Send to e-mail at: [email or web address]    Will anyone else be joining the visit? NO    How would you like to obtain your AVS? MyChart    Are changes needed to the allergy or medication list? Pt stated no changes to allergies and Pt stated no med changes    Reason for visit: JIHAN Emery LPN

## 2023-10-10 NOTE — LETTER
10/10/2023         RE: Haresh Rock  [address] [place] [address]        Dear Colleague,    Thank you for referring your patient, Haresh Rock, to the [facility] [place]. Please see a copy of my visit note below.    Virtual Visit Details    Type of service:  Video Visit   Video Start Time: 850  Video End Time: 915    Originating Location (pt. Location): Home    Distant Location (provider location):  On-site  Platform used for Video Visit: Good Hutson returns [facility] for history of a JAK2 positive atypical myeloproliferative syndrome, status post nonmyeloablative allo-sib peripheral blood stem cell transplant in 2007, complicated by chronic ulfyy-cxuoam-pnsq disease, cirrhosis, hemochromatosis with C282Y homozygosity, pulmonary emboli and DVT as well as paroxysmal atrial fibrillation for which he is on warfarin, Hodgkin disease in 2011 s/p ABVD x 5 cycles, and a history of cardiac arrhythmias with an ablation. He was  diagnosed by Dr. Ángel Hill with Parkinson disease, with weight loss, dysphagia with aspiration, requiring G tube placement, orthostasis, urinary obstruction. Most recent CT and PET 2/26/21 were without evidence for recurrence of malignancy. Hypermetabolic activity at the anorectal junction with diffuse wall thickening was worked up by 3/17/21 flex sigmoidoscopy which showed a 4 mm polyp (removed, tubular adenoma), and erythematous mucosa that was biopsied and found superficial vascular congestion and melanosis coli without evidence for GVHD. Had full colonoscopy 4/14/21 without other findings. He had TURP with Dr. Hawley for urinary obstruction 8/9/21, as medical therapy was causing too much orthostasis.  Harseh was hospitalized in October and had a small bowel resection because of a G tube complication. G tube was replaced.     INTERVAL HISTORY  Haresh is feeling well s. He has dysphagia related to Parkinson's  )He can still swallow his morning and midday pills but when he has to take many he crushes them and uses G tube instead. He does not take any food by mouth He continues to drink about 1 cup of Mountain Dew soda each day to help with feelings of dryness and cough.       He continues to receive 3200 calories/day using his G tube. He  had a G tube replacement on Sep 5 2023.       He continues to experience polyuria and nocturia (3 or 4x/night). He has also had mild reflux at night. Haresh notices pain in his R hip when lying on it, and it is not bothersome when he is moving.He has an ulcer on 1st toe left foot.It has healed after cellulose and cleaning. His sleep has been just fair waking every 2 hours and he regularly takes 60-90 minute midday naps.       Haresh's Parkinson's disease appears to be mild. He has mild rest tremors and minimal cogwheeling in his upper extremity. These can interfere with his ability to write, but he was able to sign legibly on exam.  In terms of his lower platelets no new bruising or bleeding  He has not had a phlebotomy for a while for his iron overload  Last ferritin was 201 and iron sat 35%  No fever or chills. Weight is stable~ 173. No cough  INR long at 3.95 on warfarin 7.5mg/d Last night took 2.5mg and this morning 2.5mg     PAST MEDICAL HISTORY:  See my note from 06/2023     SOCIAL HISTORY:  See my note from 06/2023     FAMILY HISTORY:  See my note from  06/2023     REVIEW OF SYSTEMS:  A 12-point review of systems is done and is negative, except as in the HPI.   General: No fever, chills, nausea, vomiting, or abnormal fatigue.   Cardiac: No chest pain, palpitations, or lower extremity edema.  Respiratory: Cough with dry mouth, shortness of breath upon exertion  Skin: No bruising, rash, or abnormal pigmentation.   GI: Dysphagia from Parkinson's. G tube for feeding. No constipation or bleeding.  : Polyuria and nocturia. No incontinence or bleeding.  MSK: Lower back pain after sleep. R  hip pain when lying on it.      PHYSICAL EXAMINATION:  By the video, he is an alert gentleman, verbal, in no acute distress.     EYES:  Grossly normal to inspection, no discharge, erythema or icterus.   SKIN:  Visible skin is clear.  No significant rash or abnormal pigmentation.   NEUROLOGIC:  Cranial nerves seem to be intact.  Mentation and speech is appropriate for age.   PSYCHIATRIC:  Mentation appears normal.  He does not seem anxious today.      Latest Reference Range & Units 10/09/23 11:36   Sodium 135 - 145 mmol/L 133 (L)   Potassium 3.4 - 5.3 mmol/L 4.6   Chloride 98 - 107 mmol/L 98   Carbon Dioxide (CO2) 22 - 29 mmol/L 25   Urea Nitrogen 8.0 - 23.0 mg/dL 33.9 (H)   Creatinine 0.67 - 1.17 mg/dL 1.03   GFR Estimate >60 mL/min/1.73m2 76   Calcium 8.8 - 10.2 mg/dL 8.8   Anion Gap 7 - 15 mmol/L 10   Albumin 3.5 - 5.2 g/dL 3.9   Protein Total 6.4 - 8.3 g/dL 7.5   Alkaline Phosphatase 40 - 129 U/L 113   ALT 0 - 70 U/L 7   AST 0 - 45 U/L 30   Bilirubin Total <=1.2 mg/dL 1.0   CRP Inflammation <5.00 mg/L 55.40 (H)   Ferritin 31 - 409 ng/mL 124   Glucose 70 - 99 mg/dL 203 (H)   Iron 61 - 157 ug/dL 86   Iron Binding Capacity 240 - 430 ug/dL 246   Iron Sat Index 15 - 46 % 35   Lactate Dehydrogenase 0 - 250 U/L 200   Vitamin B12 232 - 1,245 pg/mL 1,485 (H)   Vitamin D, Total (25-Hydroxy) 20 - 50 ng/mL 33   % Retic 0.5 - 2.0 % 2.0   Absolute Retic 0.025 - 0.095 10e6/uL 0.096 (H)   Sed Rate 0 - 20 mm/hr 54 (H)   INR 0.85 - 1.15  3.95 (H)   (L): Data is abnormally low  (H): Data is abnormally high    ASSESSMENT:     1.  Myeloproliferative syndrome/MDS. STEVIE 2 positive  2.  Status post non-myeloablative allo-sib peripheral blood stem cell transplant 2007  3.  History of chronic yaflm-mkepxb-zzlu disease.   4.  History of Hodgkin's disease 2011 s/p ABVD x 5 cycles.   5.  History of cardiac arrhythmias, SVT and V tach.   6.  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   7.  History of  cirrhosis.   8.  History of pulmonary emboli.   9.  History of elevated hemoglobin A1c.   10. Thyroid nodule.    11. Cholelithiasis.     12. Diverticulosis.   13. Anticoagulation with warfarin for history of DVT, PE, and paroxysmal atrial fibrillation  14. Status post bilateral knee replacement.     15. Aortic stenosis  16. 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  17. Seborrheic keratosis of the back.  18. Weight loss, dysphagia, anorexia related to Parkinson's  19. Parkinson's  20. 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 Chronic aspiration related to dysphagia  22. Thrombocytopenia  23. GERD  24. Elevated CRP     ASSESSMENT:    Haresh's platelet count was not checked.I will have you go to [facility] today. Not sure why the CRP is so high. He has maintained his weight since Apr 2023 (173lbs today). I wonder if he has portal hypertension and splenic sequestration due to previous cirrhosis.  Have not  done a doppler of portal vein for quite a [place] did  receive COVID and flu vaccine last week.Will not do a phlebotomy at this time Continue warfarin, INR 3.95 and will adjust with Coumadin clinic today     I will see Haresh again in 4 months.     I spent a total of 25 minutes face to face with Haresh Rock during today's office visit. Over 50% of this time was spent counseling the patient and coordinating the care regarding Hodgkins and BMT and 10 minutes preparing to see the patient and  care coordination      Augusto Miller MD  [phone]

## 2023-10-10 NOTE — TELEPHONE ENCOUNTER
Received call from patient. He is calling to schedule a lab appointment. Soft transferred call to central scheduling to further assist.    PEDRO LUIS Silverio RN  [facility]

## 2023-10-11 RX ORDER — METOPROLOL TARTRATE 25 MG/1
TABLET, FILM COATED ORAL
Qty: 90 TABLET | Refills: 0 | Status: SHIPPED | OUTPATIENT
Start: 2023-10-11 | End: 2024-01-09

## 2023-10-13 ENCOUNTER — TRANSFERRED RECORDS (OUTPATIENT)
Dept: HEALTH INFORMATION MANAGEMENT | Facility: CLINIC | Age: 73
End: 2023-10-13
Payer: COMMERCIAL

## 2023-10-13 ENCOUNTER — ANTICOAGULATION THERAPY VISIT (OUTPATIENT)
Dept: ANTICOAGULATION | Facility: CLINIC | Age: 73
End: 2023-10-13
Payer: COMMERCIAL

## 2023-10-13 DIAGNOSIS — Z79.01 LONG TERM CURRENT USE OF ANTICOAGULANT THERAPY: Primary | ICD-10-CM

## 2023-10-13 DIAGNOSIS — I48.20 CHRONIC ATRIAL FIBRILLATION (H): ICD-10-CM

## 2023-10-13 DIAGNOSIS — Z86.711 PERSONAL HISTORY OF PE (PULMONARY EMBOLISM): ICD-10-CM

## 2023-10-13 DIAGNOSIS — I48.0 PAROXYSMAL ATRIAL FIBRILLATION (H): ICD-10-CM

## 2023-10-13 LAB — INR HOME MONITORING: 3 RATIO (ref 2–3)

## 2023-10-13 NOTE — PROGRESS NOTES
ANTICOAGULATION MANAGEMENT     Haresh Rock 73 year old male is on warfarin with therapeutic INR result. (Goal INR 2.0-3.0)    Recent labs: (last 7 days)     10/13/23  1754   INR 3       ASSESSMENT     Source(s): Chart Review and Patient/Caregiver Call     Warfarin doses taken: Warfarin taken as instructed  Diet: No new diet changes identified  Medication/supplement changes: None noted  New illness, injury, or hospitalization: No  Signs or symptoms of bleeding or clotting: No  Previous result: Supratherapeutic  Additional findings: None       PLAN     Recommended plan for no diet, medication or health factor changes affecting INR     Dosing Instructions: Continue your current warfarin dose with next INR in 1 week       Summary  As of 10/13/2023      Full warfarin instructions:  2.5 mg every Tue, Thu, Sat; 3.75 mg all other days   Next INR check:  10/20/2023               Telephone call with Haresh who verbalizes understanding and agrees to plan  Sent [employer] message with dosing and follow up instructions    Patient to recheck with home meter    Education provided:   Please call back if any changes to your diet, medications or how you've been taking warfarin  Resume manage by exception with home monitor. Continue to submit INR results to home monitor company.You will only be called when your result is out of range. Please call and notify [facility] if new medication started, dose missed, signs or symptoms of bleeding or clotting, or a surgery/procedure is scheduled.    Plan made per [facility] anticoagulation protocol    Mayi Ayala RN  Anticoagulation Clinic  10/13/2023    _______________________________________________________________________     Anticoagulation Episode Summary       Current INR goal:  2.0-3.0   TTR:  85.8% (1 y)   Target end date:  Indefinite   Send INR reminders to:  [facility] HOME MONITORING    Indications    Long-term (current) use of anticoagulants [Z79.01] [Z79.01]  Atrial fibrillation (H)  [I48.91]  Antiphospholipid antibody syndrome (H24) (Resolved) [D68.61]  Personal history of DVT (deep vein thrombosis) (Resolved) [Z86.718]  Atrial fibrillation  unspecified type (H) (Resolved) [I48.91]  Paroxysmal atrial fibrillation (H) [I48.0]  Chronic atrial fibrillation (H) [I48.20]  Personal history of PE (pulmonary embolism) [Z86.711]             Comments:  JESSICA rodas to manage by exception             Anticoagulation Care Providers       Provider Role Specialty Phone number    Anya Nowak MD Referring Family Medicine [phone]    Elizabeth Balderas MD Referring HOSPITALIST [phone]

## 2023-10-16 ENCOUNTER — TELEPHONE (OUTPATIENT)
Dept: NEUROLOGY | Facility: CLINIC | Age: 73
End: 2023-10-16
Payer: COMMERCIAL

## 2023-10-16 NOTE — TELEPHONE ENCOUNTER
Health Call Center    Phone Message    May a detailed message be left on voicemail: yes     Reason for Call: Medication Refill Request    Has the patient contacted the pharmacy for the refill? Yes   Name of medication being requested: carbidopa-levodopa (SINEMET)  MG tablet [[id number]] (Order [id number]   Provider who prescribed the medication: Dr. Hill  Pharmacy:   [facility] - [address]     Date medication is needed: asap   Pt called requesting the medication refill be sent to [employer].    Please call Pt back at [phone] to confirm.     Action Taken: Message routed to:  Other: [facility]    Travel Screening: Not Applicable

## 2023-10-19 ENCOUNTER — PATIENT OUTREACH (OUTPATIENT)
Dept: CARE COORDINATION | Facility: CLINIC | Age: 73
End: 2023-10-19
Payer: COMMERCIAL

## 2023-10-20 NOTE — PROGRESS NOTES
[facility]/Voicemail       Clinical Data: Care Coordinator Outreach  Outreach attempted x 1.  Left message on patient's voicemail with call back information and requested return call.  Plan:   Care Coordinator will try to reach patient again in 3-5 business days.    Next outreach due: 10/25/23

## 2023-10-21 LAB — INR HOME MONITORING: 2.7 RATIO (ref 2–3)

## 2023-10-23 ENCOUNTER — DOCUMENTATION ONLY (OUTPATIENT)
Dept: ANTICOAGULATION | Facility: CLINIC | Age: 73
End: 2023-10-23
Payer: COMMERCIAL

## 2023-10-23 ENCOUNTER — TELEPHONE (OUTPATIENT)
Dept: NEUROLOGY | Facility: CLINIC | Age: 73
End: 2023-10-23
Payer: COMMERCIAL

## 2023-10-23 DIAGNOSIS — I48.0 PAROXYSMAL ATRIAL FIBRILLATION (H): ICD-10-CM

## 2023-10-23 DIAGNOSIS — G20.A1 PARKINSON'S DISEASE, UNSPECIFIED WHETHER DYSKINESIA PRESENT, UNSPECIFIED WHETHER MANIFESTATIONS FLUCTUATE (H): ICD-10-CM

## 2023-10-23 DIAGNOSIS — Z79.01 LONG TERM CURRENT USE OF ANTICOAGULANT THERAPY: Primary | ICD-10-CM

## 2023-10-23 DIAGNOSIS — I48.20 CHRONIC ATRIAL FIBRILLATION (H): ICD-10-CM

## 2023-10-23 DIAGNOSIS — Z86.711 PERSONAL HISTORY OF PE (PULMONARY EMBOLISM): ICD-10-CM

## 2023-10-23 RX ORDER — CARBIDOPA AND LEVODOPA 25; 100 MG/1; MG/1
TABLET ORAL
Qty: 270 TABLET | Refills: 3 | Status: SHIPPED | OUTPATIENT
Start: 2023-10-23 | End: 2024-04-29

## 2023-10-23 NOTE — TELEPHONE ENCOUNTER
Pt reports that he requested for a refill of his carbidopa-levodopa via [facility] but received a message that prescription needs to be authorized by the provider. A new prescription was recently ordered on 10/9 but sent to  pharmacy in [place]. Pt confirmed that he has at least 1-2 weeks left of his current supply at home so new order placed to [facility] home delivery. Note sent to pharmacy to expedite shipment.       Pily Busby, RNCC  Neurology/Neurosurgery         .

## 2023-10-23 NOTE — TELEPHONE ENCOUNTER
Health Call Center    Phone Message    May a detailed message be left on voicemail: yes     Reason for Call: Medication Question or concern regarding medication   Prescription Clarification  Name of Medication: carbidopa-levodopa (SINEMET)  MG tablet [[id number]] (Order [id number])   Prescribing Provider:     Ángel Hill MD      Pharmacy:   [facility] - [address]       What on the order needs clarification? Pt says the pharmacy has an order from the 11th that would need to be approved.    Please call Haresh @ [phone] to discuss.    Action Taken: Message routed to:  [facility] Neurology    Travel Screening: Not Applicable

## 2023-10-23 NOTE — PROGRESS NOTES
ANTICOAGULATION  MANAGEMENT-Home Monitor Managed by Exception    Haresh EMILIE Rock 73 year old male is on warfarin with therapeutic INR result. (Goal INR 2.0-3.0)    Recent labs: (last 7 days)     10/21/23  1204   INR 2.7       Previous INR was Therapeutic  Medication, diet, health changes since last INR:chart reviewed; none identified  Contacted within the last 12 weeks by phone on 10/13/23      MINNIE Hutson was NOT contacted regarding therapeutic result today per home monitoring policy manage by exception agreement.   Current warfarin dose is to be continued:     Summary  As of 10/23/2023      Full warfarin instructions:  2.5 mg every Tue, Thu, Sat; 3.75 mg all other days   Next INR check:  10/27/2023             ?   Mayi Ayala RN  Anticoagulation Clinic  10/23/2023    _______________________________________________________________________     Anticoagulation Episode Summary       Current INR goal:  2.0-3.0   TTR:  86.8% (1 y)   Target end date:  Indefinite   Send INR reminders to:  [facility] MONITOR[facility]    Indications    Long-term (current) use of anticoagulants [Z79.01] [Z79.01]  Atrial fibrillation (H) [I48.91]  Antiphospholipid antibody syndrome (H24) (Resolved) [D68.61]  Personal history of DVT (deep vein thrombosis) (Resolved) [Z86.718]  Atrial fibrillation  unspecified type (H) (Resolved) [I48.91]  Paroxysmal atrial fibrillation (H) [I48.0]  Chronic atrial fibrillation (H) [I48.20]  Personal history of PE (pulmonary embolism) [Z86.711]             Comments:  JESSICA rodas to manage by exception             Anticoagulation Care Providers       Provider Role Specialty Phone number    Anya Nowak MD Referring Family Medicine [phone]    Elizabeth Balderas MD Referring HOSPITALIST [phone]

## 2023-10-23 NOTE — TELEPHONE ENCOUNTER
Writer left detailed message requesting patient call back to discuss. Prescription was reordered on 10/9/23 but sent to  pharmacy in [place] vs [facility]. To verify that pt still has a supply at home before reordering to [facility].       Pily Busby, RNCC  Neurology/Neurosurgery

## 2023-10-24 ENCOUNTER — PATIENT OUTREACH (OUTPATIENT)
Dept: CARE COORDINATION | Facility: CLINIC | Age: 73
End: 2023-10-24
Payer: COMMERCIAL

## 2023-10-24 NOTE — PROGRESS NOTES
Clinic Care Coordination Contact  [facility]/Voicemail    Clinical Data: Care Coordinator Outreach        Left message on patient's voicemail with call back information and requested return call.    Plan: Care Coordinator sent care coordination introduction letter on 11/5/21 via [employer]. Care Coordinator will try to reach patient again in 3-5 business days.    Uriel Zhou MSN, RN, PHN, CCM   Primary Care Clinical RN Care Coordinator  [facility]  10/24/2023   3:21 PM  [email or web address]  Office: [phone]

## 2023-10-25 NOTE — PROGRESS NOTES
Clinic Care Coordination Contact  [facility]/Voicemail    Clinical Data: Care Coordinator Outreach    Outreach Documentation Number of Outreach Attempt   10/25/2023  12:25 PM 2       Left message on patient's voicemail with call back information and requested return call.    Plan: Care Coordinator sent care coordination introduction letter on 11/5/21 via [employer]. Care Coordinator will try to reach patient again in 1-2 business days.    Uriel Zhou MSN, RN, PHN, [facility]   Primary Care Clinical RN Care Coordinator  [facility]  10/25/2023   12:26 PM  [email or web address]  Office: [phone]

## 2023-10-26 ENCOUNTER — PATIENT OUTREACH (OUTPATIENT)
Dept: GASTROENTEROLOGY | Facility: CLINIC | Age: 73
End: 2023-10-26
Payer: COMMERCIAL

## 2023-10-27 ENCOUNTER — PATIENT OUTREACH (OUTPATIENT)
Dept: CARE COORDINATION | Facility: CLINIC | Age: 73
End: 2023-10-27
Payer: COMMERCIAL

## 2023-10-27 ASSESSMENT — ACTIVITIES OF DAILY LIVING (ADL): DEPENDENT_IADLS:: INDEPENDENT

## 2023-10-27 NOTE — PROGRESS NOTES
Clinic Care Coordination Contact  Clinic Care Coordination Contact  OUTREACH    Referral Information:  Referral Source: IP Report    Primary Diagnosis: Neurological Disorders    Chief Complaint   Patient presents with    Clinic Care Coordination - Initial     Annual Assessment        Universal Utilization: The patient uses the [facility] system and the [facility].  Clinic Utilization  Difficulty keeping appointments:: No  Compliance Concerns: No  No-Show Concerns: No  No PCP office visit in Past Year: No  Utilization      No Show Count (past year)  1             ED Visits  0             Hospital Admissions  0                    Current as of: 10/26/2023  5:57 PM                Clinical Concerns:  Current Medical Concerns: The patient has Parkinson's which he is working on being able to not use an assistive device at all.  At this point in time he says he is pretty successful.  The RN CC reminded him that there are times when its icy outside and it would be a good idea to have the extra security so that he does not fall.  The patient stated understanding and that he will keep it in mind.  At this time there are no questions about medications, those were reviewed with the patient.  Currently the patient is working with the CHW on the monthly basis.  And the RN CC makes a chart review every 6 weeks.  Patient Active Problem List   Diagnosis    Hemochromatosis    Czvlo-uwtybw-qkaj disease, chronic (H)    Advanced directives, counseling/discussion    Polyneuropathy    Status post total knee replacements    Status post bone marrow transplant (H)    Hyperlipidemia with target LDL less than 100    Atrial fibrillation (H)    Chronic rhinitis    Gallstones without obstruction of gallbladder    Long-term (current) use of anticoagulants [Z79.01]    Thyroid nodule    Type 2 diabetes mellitus with stage 2 chronic kidney disease, without long-term current use of insulin (H)    History of Hodgkin's lymphoma    History of  irradiation, presenting hazards to health    Primary pulmonary hypertension (H)    Hereditary hemochromatosis (H24)    Oropharyngeal dysphagia    Articulation disorder    Personal history of PE (pulmonary embolism)    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  Education Provided to patient: Reviewed with the patient that with the colder and icy weather that he may need to take a cane or walker with him to make sure he does not fall.  Pain  Pain (GOAL):: No  Health Maintenance Reviewed: Due/Overdue   Health Maintenance Due   Topic Date Due    HF ACTION PLAN  Never done    HEPATITIS A IMMUNIZATION (1 of 2 - Risk 2-dose series) Never done    RSV VACCINE 60+ (1 - 1-dose 60+ series) Never done    ZOSTER IMMUNIZATION (1 of 2) 11/14/2016    DTAP/TDAP/TD IMMUNIZATION (2 - Td or Tdap) 09/11/2022    A1C  08/25/2023    INFLUENZA VACCINE (1) 09/01/2023    COVID-19 Vaccine (7 - 2023-24 season) 09/01/2023    LIPID  10/11/2023    MICROALBUMIN  10/11/2023    DIABETIC FOOT EXAM  10/11/2023    MEDICARE ANNUAL WELLNESS VISIT  10/11/2023       Clinical Pathway: None    Medication Management:  Medication review status: Medications reviewed and no changes reported per patient.        Patient is knowledgeable on medications and is adherent.  No financial concerns reported at this time.  Medication review was completed with the patient and there are no questions or concerns at this time.       Functional Status:  Dependent ADLs:: Independent  Dependent IADLs:: Independent  Bed or wheelchair confined::  No  Mobility Status: Independent  Fallen 2 or more times in the past year?: No  Any fall with injury in the past year?: No    Living Situation:  Current living arrangement:: I live in a private home with family  Type of residence:: Private home - stairs    Lifestyle & Psychosocial Needs:    Social Determinants of Health     Food Insecurity: Not on file   Depression: Not at risk (1/23/2023)    PHQ-2     PHQ-2 Score: 0   Housing Stability: Not on file   Tobacco Use: Low Risk  (10/10/2023)    Patient History     Smoking Tobacco Use: Never     Smokeless Tobacco Use: Never     Passive Exposure: Not on file   Financial Resource Strain: Not on file   Alcohol Use: Not on file   Transportation Needs: Not on file   Physical Activity: Not on file   Interpersonal Safety: Not on file   Stress: Not on file   Social Connections: Not on file     Diet:: Diabetic diet  Inadequate nutrition (GOAL):: Yes  Tube Feeding: Yes  Tube Feeding: G-tube  Inadequate activity/exercise (GOAL):: No  Significant changes in sleep pattern (GOAL): No  Transportation means:: Regular car     Yazidi or spiritual beliefs that impact treatment:: No  Mental health DX:: No  Mental health management concern (GOAL):: No  Chemical Dependency Status: No Current Concerns  Informal Support system:: Spouse, Family             Resources and Interventions:  Current Resources:      Community Resources: Home Infusion  Supplies Currently Used at Home: Enteral Nutrition & Supplies  Equipment Currently Used at Home: glucometer  Employment Status: retired         Advance Care Plan/Directive  Advanced Care Plans/Directives on file:: Yes  Type Advanced Care Plans/Directives: Advanced Directive - On File    Referrals Placed: None         Care Plan:  Care Plan: General  Work on increasing strength and stamina for a year after reaching 100%       Problem: HP GENERAL PROBLEM       Goal: General  work on walking without the walker, and increasing my strength and stamina.  For  at least 1 year after reaching 100%.       Start Date: 12/13/2021 Expected End Date: 6/13/2024    This Visit's Progress: 20% Recent Progress: On Hold    Note:     Barriers: suffers from parkinson's  Strengths: engaged in care coordination  Patient expressed understanding of goal: yes  Action steps to achieve this goal:  1. I will continue walking without the walker. No longer using the walker as of 3/15/22. Completed action step  2. I will go outside without the walker when I feel strong enough and have increased my strength and stamina.  Continue to work on for at least a year.  3. I will frequently walk around inside the house to increase my strength and stamina.  Continue to work on for at least a year.                            Care Plan: General - Working on balance and items in the path,       Problem: HP GENERAL PROBLEM       Goal: General Goal - work on balance and avoiding items in the path       Start Date: 10/28/2022 Expected End Date: 10/28/2024    This Visit's Progress: 10% Recent Progress: On Hold    Note:     Barriers: parkinson's  Strengths: engaged in care coordination  Patient expressed understanding of goal: yes   Action steps to achieve this goal:  1. I will work on walking around items in my path without losing my balance.  2. I will work on maintaining my balance when I walk through the house.  3. I will use my walker or cane as needed to maintain my balance.                                Patient/Caregiver understanding: The patient has a very good understanding of the disease process.  And he is working on his balance at this time.    Outreach Frequency: 6 weeks, more frequently as needed  Future Appointments                In 3 days Bekah Matt MD [facility] CLIN    In 3 months [facility], [facility]    In 3 months Augusto Miller MD [facility] Blood and Marrow Transplant Program [facility]  [facility]    In 6 months Ángel Hill MD [facility] [place], [place]            Plan: 1.  The patient will take all medications as prescribed by the providers.  2.  The patient will work on his balance as he walks through the house.  3.  The patient will use his walker or cane as needed for icy conditions to prevent falls.          Uriel Zhou MSN, RN, PHN, [facility]   Primary Care Clinical RN Care Coordinator  [facility]  10/27/2023   12:32 PM  [email or web address]  Office: [phone]

## 2023-10-30 ENCOUNTER — OFFICE VISIT (OUTPATIENT)
Dept: CARDIOLOGY | Facility: CLINIC | Age: 73
End: 2023-10-30
Attending: INTERNAL MEDICINE
Payer: COMMERCIAL

## 2023-10-30 VITALS
OXYGEN SATURATION: 98 % | WEIGHT: 176 LBS | HEART RATE: 78 BPM | SYSTOLIC BLOOD PRESSURE: 131 MMHG | BODY MASS INDEX: 24.55 KG/M2 | DIASTOLIC BLOOD PRESSURE: 71 MMHG

## 2023-10-30 DIAGNOSIS — I42.8 NONISCHEMIC CARDIOMYOPATHY (H): ICD-10-CM

## 2023-10-30 DIAGNOSIS — I48.0 PAROXYSMAL ATRIAL FIBRILLATION (H): Primary | ICD-10-CM

## 2023-10-30 PROCEDURE — 99213 OFFICE O/P EST LOW 20 MIN: CPT | Performed by: INTERNAL MEDICINE

## 2023-10-30 NOTE — PATIENT INSTRUCTIONS
Thank you for coming to the [facility]; please note the following instructions:    1. Dr. Bekah Matt recommends to follow up in 1 year with an echo prior.  The cardiology team will contact you to schedule when the time gets closer.          If you have any questions regarding your visit please contact your care team:     Cardiology  Telephone Number   Tiffany GUZMAN., RN  Vane BELL, RN  Mandi RAMIREZ, RMA  Annabelle NEFF, QASIM CACERES, Visit Facilitator  Nayana ZELAYA [facility] [phone] (option 1)   For scheduling appts:     [phone] (select option 1)       For the Device Clinic (Pacemakers and ICD's)  RN's :  Chelsey Carrera   During business hours: [phone]    *After business hours:  [phone] (select option 4)      Normal test result notifications will be released via [employer] or mailed within 7 business days.  All other test results, will be communicated via telephone once reviewed by your cardiologist.    If you need a medication refill please contact your pharmacy.  Please allow 3 business days for your refill to be completed.    As always, thank you for trusting us with your health care needs!

## 2023-10-30 NOTE — NURSING NOTE
"Chief Complaint   Patient presents with    RECHECK     Return general cardiology for annual follow-up       Initial /71 (BP Location: Right arm, Patient Position: Chair, Cuff Size: Adult Regular)   Pulse 78   Wt 79.8 kg (176 lb)   SpO2 98%   BMI 24.55 kg/m   Estimated body mass index is 24.55 kg/m  as calculated from the following:    Height as of 10/10/23: 1.803 m (5' 11\").    Weight as of this encounter: 79.8 kg (176 lb)..  BP completed using cuff size: regular    QASIM Araujo    "

## 2023-10-30 NOTE — PROGRESS NOTES
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    Patient's last visit with me was in October 2022.  Patient has been doing well from an atrial fibrillation standpoint.  There is no known recurrent atrial fibrillation episodes.  Patient did not report any symptoms of prolonged palpitations, exertional dyspnea, exertional angina, frequent lightheadedness, presyncope or syncope.       PAST MEDICAL HISTORY:  Past Medical History:   Diagnosis Date    Abnormal dopamine scan (DaTSCAN) 2020 11/04/2020    [phone] 11/3/2020   Narrative & Impression   Examination: Nuclear medicine DATscan for Dopamine Receptor Localization.   Examination: NM Brain Image Tomographic (SPECT) DATscan   Date: 11/3/2020 3:21 PM   Indication: Parkinsonism   Comparison: None   Additional Information: none   Interfering Medications: None   Technique:   The patient initially received 1 ml of Lugol's solution orally prior    Antiphospholipid antibody syndrome (H24)     Ravi's, noted negative per testing re-done in 2008 in hematology note 01/13/2009    Articulation disorder 09/23/2020    Atrial fibrillation (H) 04/2011    Benign prostatic hyperplasia with urinary retention     Cirrhosis (H)     CKD (chronic kidney disease) stage 2, GFR 60-89 ml/min     Diabetes mellitus type II     steroid induced    DJD (degenerative joint disease) of knee     SHAWN, worse on right    DVT (deep venous thrombosis) (H)     ED (erectile dysfunction)     Gallbladder polyp 05/2010    Wgpgx-Zelmmj-Whvv Disease 2007    BMT    Hemochromatosis     Hodgkin's disease NOS     5 cycles of ABVD in 2011    HTN (hypertension)     Hyperlipidaemia LDL goal <100     Multiple thyroid nodules     benign      Myeloproliferative disorder (H)     atypical, [facility]    Parkinson disease 09/24/2020    PE (pulmonary embolism) 11/2004    Polyneuropathy     Pressure ulcer     Primary pulmonary hypertension (H)     Severe protein-calorie malnutrition (H24) 11/10/2021    Small bowel obstruction (H) 10/29/2021    Thrombocytopenia (H24) 06/08/2021       CURRENT MEDICATIONS:  Current Outpatient Medications   Medication Sig Dispense Refill    amoxicillin (AMOXIL) 500 MG capsule Take 2,000 mg by mouth once Take 1 hour prior to dental procedure      carbidopa-levodopa (SINEMET)  MG tablet 1 tab 3/day at 8am, 2pm and 8pm 270 tablet 3    flecainide (TAMBOCOR) 50 MG tablet TAKE 1 TABLET TWICE A  tablet 2    loratadine (CLARITIN REDITABS) 10 MG ODT Take 10 mg by mouth daily      metoprolol tartrate (LOPRESSOR) 25 MG tablet TAKE ONE-HALF (1/2) TABLET (12.5 MG) ORALLY OR BY FEEDING TUBE TWICE A DAY (CRUSH TABLET) 90 tablet 0    NONFORMULARY Abbott osmolyte feedings 2 cans 3/day      omeprazole (PRILOSEC) 20 MG DR capsule TAKE 1 CAPSULE BY MOUTH EVERY MORNING. 90 capsule 0    PREVIDENT 5000 DRY MOUTH 1.1 % GEL topical gel Apply to affected area daily as needed Or brushes with water  3    rosuvastatin (CRESTOR) 40 MG tablet Take 1 tablet (40 mg) by mouth At Bedtime 90 tablet 3    warfarin ANTICOAGULANT (COUMADIN) 2.5 MG tablet TAKE 1 TABLET (2.5 MG) by mouth every Tue, Fri; and take ONE AND ONE-HALF TABLETS (3.75 MG) ALL OTHER DAYS OR AS DIRECTED BY ANTICOAGULATION CLINIC 180 tablet 1       PAST SURGICAL HISTORY:  Past Surgical History:   Procedure Laterality Date    ANESTHESIA CARDIOVERSION  04/21/2011    Procedure:ANESTHESIA CARDIOVERSION; Surgeon:GENERIC ANESTHESIA PROVIDER; Location:[facility] OR    ARTHROSCOPY KNEE RT/LT      LT    CATARACT IOL, RT/LT  2017    COLONOSCOPY  10/16/2012    Procedure: COLONOSCOPY;  Colonoscopy, screening;  Surgeon: Reg Feliciano MD;  Location: [facility] OR    COLONOSCOPY N/A 04/14/2021    Procedure:  COLONOSCOPY;  Surgeon: Reg Holm MD;  Location: [facility] GI    ESOPHAGOSCOPY, GASTROSCOPY, DUODENOSCOPY (EGD), COMBINED N/A 10/07/2020    Procedure: ESOPHAGOGASTRODUODENOSCOPY, WITH BIOPSY;  Surgeon: Raul Sawyer DO;  Location: [facility] OR    H ABLATION FOCAL AFIB  03/05/2013    PVI    H ABLATION FOCAL AFIB  01/01/2018    HC BONE MARROW/STEM TRANSPLANT ALLOGENIC  05/08/2007    INSERT PORT VASCULAR ACCESS      IR GASTROSTOMY TUBE PERCUTANEOUS PLCMNT  10/25/2021    JOINT REPLACEMTN, KNEE RT/LT  03/28/2012    SHAWN    LAPAROSCOPY DIAGNOSTIC (GENERAL) N/A 10/29/2021    Procedure: LAPAROSCOPY, DIAGNOSTIC, TAKEDOWN OF MALPOSITIONED GASTROSTOMY TUBE, INSERTION OF GASTROSTOMY TUBE, SMALL BOWEL RESECTION X1, PRIMARY REPAIR OF SMALL BOWEL ENTEROTOMY, ABDOMINAL WASHOUT, TAP BLOCK;  Surgeon: Leif Finney DO;  Location: [facility] OR    PEG TUBE PLACEMENT  10/25/2021    VASECTOMY  1990       ALLERGIES:     Allergies   Allergen Reactions    Seasonal Allergies        FAMILY HISTORY:  - Premature coronary artery disease  - Atrial fibrillation  - Sudden cardiac death     SOCIAL HISTORY:  Social History     Tobacco Use    Smoking status: Never    Smokeless tobacco: Never   Substance Use Topics    Alcohol use: No    Drug use: No       ROS:   Constitutional: No fever, chills, or sweats. Weight stable.   ENT: No visual disturbance, ear ache, epistaxis, sore throat.   Cardiovascular: As per HPI.   Respiratory: No cough, hemoptysis.    GI: No nausea, vomiting, hematemesis, melena, or hematochezia.   : No hematuria.   Integument: Negative.   Psychiatric: Negative.   Hematologic:  Easy bruising, no easy bleeding.  Neuro: Negative.   Endocrinology: No significant heat or cold intolerance   Musculoskeletal: No myalgia.    Exam:  /71 (BP Location: Right arm, Patient Position: Chair, Cuff Size: Adult Regular)   Pulse 78   Wt 79.8 kg (176 lb)   SpO2 98%   BMI 24.55 kg/m    GENERAL APPEARANCE: healthy, alert and no  distress  HEENT: no icterus, no xanthelasmas, normal pupil size and reaction, normal palate, mucosa moist, no central cyanosis  NECK: no adenopathy, no asymmetry, masses, or scars, thyroid normal to palpation and no bruits, JVP not elevated  RESPIRATORY: lungs clear to auscultation - no rales, rhonchi or wheezes, no use of accessory muscles, no retractions, respirations are unlabored, normal respiratory rate  CARDIOVASCULAR: regular rhythm, normal S1 with physiologic split S2, no S3 or S4 and no murmur, click or rub, precordium quiet with normal PMI.  ABDOMEN: soft, non tender, without hepatosplenomegaly, no masses palpable, bowel sounds normal, aorta not enlarged by palpation, no abdominal bruits  EXTREMITIES: peripheral pulses normal, no edema, no bruits  NEURO: alert and oriented to person/place/time, normal speech, gait and affect  VASC: Radial, femoral, dorsalis pedis and posterior tibialis pulses are normal in volumes and symmetric bilaterally. No bruits are heard.  SKIN: no ecchymoses, no rashes    Labs:  CBC RESULTS:   Lab Results   Component Value Date    WBC 8.7 10/09/2023    WBC 8.8 07/05/2021    RBC 4.82 10/09/2023    RBC 4.30 (L) 07/05/2021    HGB 16.6 10/09/2023    HGB 13.8 07/05/2021    HCT 47.0 10/09/2023    HCT 38.9 (L) 07/05/2021    MCV 98 10/09/2023    MCV 91 07/05/2021    MCH 34.4 (H) 10/09/2023    MCH 32.1 07/05/2021    MCHC 35.3 10/09/2023    MCHC 35.5 07/05/2021    RDW 11.9 10/09/2023    RDW 13.0 07/05/2021    PLT 93 (L) 10/09/2023     (L) 07/05/2021       BMP RESULTS:  Lab Results   Component Value Date     (L) 10/09/2023     07/05/2021    POTASSIUM 4.6 10/09/2023    POTASSIUM 4.5 04/05/2022    POTASSIUM 3.3 (L) 07/05/2021    CHLORIDE 98 10/09/2023    CHLORIDE 100 04/05/2022    CHLORIDE 103 07/05/2021    CO2 25 10/09/2023    CO2 32 04/05/2022    CO2 28 07/05/2021    ANIONGAP 10 10/09/2023    ANIONGAP 4 04/05/2022    ANIONGAP 4 07/05/2021     (H) 10/09/2023      (H) 04/05/2022    GLC 97 07/05/2021    BUN 33.9 (H) 10/09/2023    BUN 35 (H) 04/05/2022    BUN 25 07/05/2021    CR 1.03 10/09/2023    CR 1.15 07/05/2021    GFRESTIMATED 76 10/09/2023    GFRESTIMATED 63 07/05/2021    GFRESTBLACK 73 07/05/2021    GILBERT 8.8 10/09/2023    GILBERT 8.3 (L) 07/05/2021        INR RESULTS:  Lab Results   Component Value Date    INR 2.7 10/21/2023    INR 3 10/13/2023    INR 3.9 (H) 10/09/2023    INR 3.95 (H) 10/09/2023    INR 2.7 09/29/2023    INR 2.4 (A) 11/19/2021    INR 1.9 (A) 07/16/2021    INR 2.7 (A) 07/09/2021    INR 2.87 (H) 07/05/2021    INR 1.8 (A) 07/02/2021       Procedures:      Assessment and Plan:     Paroxysmal atrial fibrillation-well-controlled by flecainide and metoprolol     Nonischemic cardiomyopathy-resolved     It is encouraging that patient has not had recurrent atrial fibrillation episodes.  We will continue current medications and see patient again by video visit in 1 year.  Prior to the visit next year, patient will undergo a transthoracic echocardiogram to evaluate left ventricular ejection fraction and the mild aortic stenosis.     All questions and concerns were addressed and patient is happy with the plan.         CC  Patient Care Team:  Anya Nowak MD as PCP - General (Family Practice)  Augusto Miller MD as MD (Hematology)  Jesusita Mejia PA-C as Physician Assistant  Pino Sharma MD as MD (Internal Medicine)  Fabi Jimenez MD as MD (INTERNAL MEDICINE - ENDOCRINOLOGY, DIABETES & METABOLISM)  Bekah Matt MD as MD (Cardiology)  Tina Mejia, RN as Specialty Care Coordinator (Cardiology)  Bekah Matt MD as Assigned Heart and Vascular Provider  Ángel Hill MD as Assigned Neuroscience Provider  Rosalva Samuels MD as MD (Family Medicine)  Bekah Matt MD as MD (Clinical Cardiac Electrophysiology)  Uriel Van, RN as Lead Care Coordinator (Primary Care - CC)  Lizbeth Chen RPH as Pharmacist (Pharmacist  Ambulatory Care)  Leslie Clifford [facility] as Pharmacist (Pharmacist)  Bonnie Walls, RN as Specialty Care Coordinator (Hematology & Oncology)  Maye Rain as Community Health Worker (Primary Care - CC)  Reg Cantu MD as MD (Critical Care)  Reg Cantu MD as Assigned Surgical Provider  Augusto Miller MD as Assigned Cancer Care Provider  Naayna Hummel PA-C as Assigned PCP  Lizbeth Chen [facility] as Assigned MTM Pharmacist  JOEL MATA

## 2023-10-30 NOTE — LETTER
10/30/2023      RE: Haresh Rock  [address] [facility] [address]       Dear Colleague,    Thank you for the opportunity to participate in the care of your patient, Haresh oRck, at the [facility]. Please see a copy of my visit note below.    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    Patient's last visit with me was in October 2022.  Patient has been doing well from an atrial fibrillation standpoint.  There is no known recurrent atrial fibrillation episodes.  Patient did not report any symptoms of prolonged palpitations, exertional dyspnea, exertional angina, frequent lightheadedness, presyncope or syncope.       PAST MEDICAL HISTORY:  Past Medical History:   Diagnosis Date     Abnormal dopamine scan (DaTSCAN) 2020 11/04/2020    [phone] 11/3/2020   Narrative & Impression   Examination: Nuclear medicine DATscan for Dopamine Receptor Localization.   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/23/2020     Atrial fibrillation (H) 04/2011     Benign prostatic hyperplasia with urinary retention      Cirrhosis (H)      CKD (chronic kidney disease) stage 2, GFR 60-89 ml/min      Diabetes mellitus type II     steroid induced     DJD (degenerative joint  disease) of knee     SHAWN, worse on right     DVT (deep venous thrombosis) (H)      ED (erectile dysfunction)      Gallbladder polyp 05/2010     Lbujk-Rywija-Qdfs Disease 2007    BMT     Hemochromatosis      Hodgkin's disease NOS     5 cycles of ABVD in 2011     HTN (hypertension)      Hyperlipidaemia LDL goal <100      Multiple thyroid nodules     benign      Myeloproliferative disorder (H)     atypical, [facility]     Parkinson disease 09/24/2020     PE (pulmonary embolism) 11/2004     Polyneuropathy      Pressure ulcer      Primary pulmonary hypertension (H)      Severe protein-calorie malnutrition (H24) 11/10/2021     Small bowel obstruction (H) 10/29/2021     Thrombocytopenia (H24) 06/08/2021       CURRENT MEDICATIONS:  Current Outpatient Medications   Medication Sig Dispense Refill     amoxicillin (AMOXIL) 500 MG capsule Take 2,000 mg by mouth once Take 1 hour prior to dental procedure       carbidopa-levodopa (SINEMET)  MG tablet 1 tab 3/day at 8am, 2pm and 8pm 270 tablet 3     flecainide (TAMBOCOR) 50 MG tablet TAKE 1 TABLET TWICE A  tablet 2     loratadine (CLARITIN REDITABS) 10 MG ODT Take 10 mg by mouth daily       metoprolol tartrate (LOPRESSOR) 25 MG tablet TAKE ONE-HALF (1/2) TABLET (12.5 MG) ORALLY OR BY FEEDING TUBE TWICE A DAY (CRUSH TABLET) 90 tablet 0     NONFORMULARY Abbott osmolyte feedings 2 cans 3/day       omeprazole (PRILOSEC) 20 MG DR capsule TAKE 1 CAPSULE BY MOUTH EVERY MORNING. 90 capsule 0     PREVIDENT 5000 DRY MOUTH 1.1 % GEL topical gel Apply to affected area daily as needed Or brushes with water  3     rosuvastatin (CRESTOR) 40 MG tablet Take 1 tablet (40 mg) by mouth At Bedtime 90 tablet 3     warfarin ANTICOAGULANT (COUMADIN) 2.5 MG tablet TAKE 1 TABLET (2.5 MG) by mouth every Tue, Fri; and take ONE AND ONE-HALF TABLETS (3.75 MG) ALL OTHER DAYS OR AS DIRECTED BY ANTICOAGULATION CLINIC 180 tablet 1       PAST SURGICAL HISTORY:  Past Surgical History:   Procedure Laterality  Date     ANESTHESIA CARDIOVERSION  04/21/2011    Procedure:ANESTHESIA CARDIOVERSION; Surgeon:GENERIC ANESTHESIA PROVIDER; Location:[facility] OR     ARTHROSCOPY KNEE RT/LT      LT     CATARACT IOL, RT/LT  2017     COLONOSCOPY  10/16/2012    Procedure: COLONOSCOPY;  Colonoscopy, screening;  Surgeon: Reg Feliicano MD;  Location: [facility] OR     COLONOSCOPY N/A 04/14/2021    Procedure: COLONOSCOPY;  Surgeon: Reg Holm MD;  Location: [facility] GI     ESOPHAGOSCOPY, GASTROSCOPY, DUODENOSCOPY (EGD), COMBINED N/A 10/07/2020    Procedure: ESOPHAGOGASTRODUODENOSCOPY, WITH BIOPSY;  Surgeon: Raul Sawyer DO;  Location: [facility] OR     H ABLATION FOCAL AFIB  03/05/2013    PVI     H ABLATION FOCAL AFIB  01/01/2018     HC BONE MARROW/STEM TRANSPLANT ALLOGENIC  05/08/2007     INSERT PORT VASCULAR ACCESS       IR GASTROSTOMY TUBE PERCUTANEOUS PLCMNT  10/25/2021     JOINT REPLACEMTN, KNEE RT/LT  03/28/2012    SHAWN     LAPAROSCOPY DIAGNOSTIC (GENERAL) N/A 10/29/2021    Procedure: LAPAROSCOPY, DIAGNOSTIC, TAKEDOWN OF MALPOSITIONED GASTROSTOMY TUBE, INSERTION OF GASTROSTOMY TUBE, SMALL BOWEL RESECTION X1, PRIMARY REPAIR OF SMALL BOWEL ENTEROTOMY, ABDOMINAL WASHOUT, TAP BLOCK;  Surgeon: Leif Finney DO;  Location: [facility] OR     PEG TUBE PLACEMENT  10/25/2021     VASECTOMY  1990       ALLERGIES:     Allergies   Allergen Reactions     Seasonal Allergies        FAMILY HISTORY:  - Premature coronary artery disease  - Atrial fibrillation  - Sudden cardiac death     SOCIAL HISTORY:  Social History     Tobacco Use     Smoking status: Never     Smokeless tobacco: Never   Substance Use Topics     Alcohol use: No     Drug use: No       ROS:   Constitutional: No fever, chills, or sweats. Weight stable.   ENT: No visual disturbance, ear ache, epistaxis, sore throat.   Cardiovascular: As per HPI.   Respiratory: No cough, hemoptysis.    GI: No nausea, vomiting, hematemesis, melena, or hematochezia.   : No hematuria.   Integument: Negative.    Psychiatric: Negative.   Hematologic:  Easy bruising, no easy bleeding.  Neuro: Negative.   Endocrinology: No significant heat or cold intolerance   Musculoskeletal: No myalgia.    Exam:  /71 (BP Location: Right arm, Patient Position: Chair, Cuff Size: Adult Regular)   Pulse 78   Wt 79.8 kg (176 lb)   SpO2 98%   BMI 24.55 kg/m    GENERAL APPEARANCE: healthy, alert and no distress  HEENT: no icterus, no xanthelasmas, normal pupil size and reaction, normal palate, mucosa moist, no central cyanosis  NECK: no adenopathy, no asymmetry, masses, or scars, thyroid normal to palpation and no bruits, JVP not elevated  RESPIRATORY: lungs clear to auscultation - no rales, rhonchi or wheezes, no use of accessory muscles, no retractions, respirations are unlabored, normal respiratory rate  CARDIOVASCULAR: regular rhythm, normal S1 with physiologic split S2, no S3 or S4 and no murmur, click or rub, precordium quiet with normal PMI.  ABDOMEN: soft, non tender, without hepatosplenomegaly, no masses palpable, bowel sounds normal, aorta not enlarged by palpation, no abdominal bruits  EXTREMITIES: peripheral pulses normal, no edema, no bruits  NEURO: alert and oriented to person/place/time, normal speech, gait and affect  VASC: Radial, femoral, dorsalis pedis and posterior tibialis pulses are normal in volumes and symmetric bilaterally. No bruits are heard.  SKIN: no ecchymoses, no rashes    Labs:  CBC RESULTS:   Lab Results   Component Value Date    WBC 8.7 10/09/2023    WBC 8.8 07/05/2021    RBC 4.82 10/09/2023    RBC 4.30 (L) 07/05/2021    HGB 16.6 10/09/2023    HGB 13.8 07/05/2021    HCT 47.0 10/09/2023    HCT 38.9 (L) 07/05/2021    MCV 98 10/09/2023    MCV 91 07/05/2021    MCH 34.4 (H) 10/09/2023    MCH 32.1 07/05/2021    MCHC 35.3 10/09/2023    MCHC 35.5 07/05/2021    RDW 11.9 10/09/2023    RDW 13.0 07/05/2021    PLT 93 (L) 10/09/2023     (L) 07/05/2021       BMP RESULTS:  Lab Results   Component Value Date      (L) 10/09/2023     07/05/2021    POTASSIUM 4.6 10/09/2023    POTASSIUM 4.5 04/05/2022    POTASSIUM 3.3 (L) 07/05/2021    CHLORIDE 98 10/09/2023    CHLORIDE 100 04/05/2022    CHLORIDE 103 07/05/2021    CO2 25 10/09/2023    CO2 32 04/05/2022    CO2 28 07/05/2021    ANIONGAP 10 10/09/2023    ANIONGAP 4 04/05/2022    ANIONGAP 4 07/05/2021     (H) 10/09/2023     (H) 04/05/2022    GLC 97 07/05/2021    BUN 33.9 (H) 10/09/2023    BUN 35 (H) 04/05/2022    BUN 25 07/05/2021    CR 1.03 10/09/2023    CR 1.15 07/05/2021    GFRESTIMATED 76 10/09/2023    GFRESTIMATED 63 07/05/2021    GFRESTBLACK 73 07/05/2021    GILBERT 8.8 10/09/2023    GILBERT 8.3 (L) 07/05/2021        INR RESULTS:  Lab Results   Component Value Date    INR 2.7 10/21/2023    INR 3 10/13/2023    INR 3.9 (H) 10/09/2023    INR 3.95 (H) 10/09/2023    INR 2.7 09/29/2023    INR 2.4 (A) 11/19/2021    INR 1.9 (A) 07/16/2021    INR 2.7 (A) 07/09/2021    INR 2.87 (H) 07/05/2021    INR 1.8 (A) 07/02/2021       Procedures:      Assessment and Plan:     Paroxysmal atrial fibrillation-well-controlled by flecainide and metoprolol     Nonischemic cardiomyopathy-resolved     It is encouraging that patient has not had recurrent atrial fibrillation episodes.  We will continue current medications and see patient again by video visit in 1 year.  Prior to the visit next year, patient will undergo a transthoracic echocardiogram to evaluate left ventricular ejection fraction and the mild aortic stenosis.     All questions and concerns were addressed and patient is happy with the plan.         CC  Patient Care Team:  Anya Nowak MD as PCP - General (Family Practice)  Augusto Miller MD as MD (Hematology)  Jesusita Mejia PA-C as Physician Assistant  Pino Sharma MD as MD (Internal Medicine)  Fabi Jimenez MD as MD (INTERNAL MEDICINE - ENDOCRINOLOGY, DIABETES & METABOLISM)  Bekah Matt MD as MD (Cardiology)  Roberto  Tina, RN as Specialty Care Coordinator (Cardiology)  Joel Mata MD as Assigned Heart and Vascular Provider  Ángel Hill MD as Assigned Neuroscience Provider  Rosalva Samuels MD as MD (Family Medicine)  Joel Mata MD as MD (Clinical Cardiac Electrophysiology)  Urile Van RN as Lead Care Coordinator (Primary Care - CC)  Lizbeth Chen RPH as Pharmacist (Pharmacist Ambulatory Care)  Leslie Clifford RP as Pharmacist (Pharmacist)  Bonnie Walls RN as Specialty Care Coordinator (Hematology & Oncology)  Maye Rain as Community Health Worker (Primary Care - CC)  Reg Cantu MD as MD (Critical Care)  Reg Cantu MD as Assigned Surgical Provider  Augusto Miller MD as Assigned Cancer Care Provider  Nayana Hummel PA-C as Assigned PCP  Lizbeth Chen RPH as Assigned MTM Pharmacist  JOEL MATA        Please do not hesitate to contact me if you have any questions/concerns.     Sincerely,     Joel Mata MD

## 2023-11-03 LAB — INR (EXTERNAL): 2.4 (ref 0.9–1.1)

## 2023-11-07 ENCOUNTER — TELEPHONE (OUTPATIENT)
Dept: ANTICOAGULATION | Facility: CLINIC | Age: 73
End: 2023-11-07
Payer: COMMERCIAL

## 2023-11-07 DIAGNOSIS — I48.0 PAROXYSMAL ATRIAL FIBRILLATION (H): ICD-10-CM

## 2023-11-07 DIAGNOSIS — Z86.711 PERSONAL HISTORY OF PE (PULMONARY EMBOLISM): ICD-10-CM

## 2023-11-07 DIAGNOSIS — Z79.01 LONG TERM CURRENT USE OF ANTICOAGULANT THERAPY: Primary | ICD-10-CM

## 2023-11-07 DIAGNOSIS — I48.20 CHRONIC ATRIAL FIBRILLATION (H): ICD-10-CM

## 2023-11-07 NOTE — TELEPHONE ENCOUNTER
ANTICOAGULATION MANAGEMENT     Haresh Rock 73 year old male is on warfarin with therapeutic INR result. (Goal INR 2-3)    Recent labs: (last 7 days)     11/04/23  0800   INR 2.4*       ASSESSMENT     Source(s): Chart Review and Patient/Caregiver Call     Warfarin doses taken: Warfarin taken as instructed  Diet: No new diet changes identified  Medication/supplement changes: None noted  New illness, injury, or hospitalization: No  Signs or symptoms of bleeding or clotting: No  Previous result: Therapeutic last 2(+) visits  Additional findings: Pt was unable to report to [facility] this past week due to an issue with his account. He is in touch with [facility] trying to get it resolved.       PLAN     Recommended plan for no diet, medication or health factor changes affecting INR     Dosing Instructions: Continue your current warfarin dose with next INR in 3 days       Summary  As of 11/7/2023      Full warfarin instructions:  2.5 mg every Tue, Thu, Sat; 3.75 mg all other days   Next INR check:  11/10/2023               Telephone call with Haresh who verbalizes understanding and agrees to plan    Patient to recheck with home meter    Education provided:   Goal range and lab monitoring: goal range and significance of current result  Resume manage by exception with home monitor. Continue to submit INR results to home monitor company.You will only be called when your result is out of range. Please call and notify [facility] if new medication started, dose missed, signs or symptoms of bleeding or clotting, or a surgery/procedure is scheduled.    Plan made per [facility] anticoagulation protocol    Hari Saavedra RN  Anticoagulation Clinic  11/7/2023    _______________________________________________________________________     Anticoagulation Episode Summary       Current INR goal:  2.0-3.0   TTR:  86.8% (1 y)   Target end date:  Indefinite   Send INR reminders to:  [employer] [facility]    Indications    Long-term (current) use of  anticoagulants [Z79.01] [Z79.01]  Atrial fibrillation (H) [I48.91]  Antiphospholipid antibody syndrome (H24) (Resolved) [D68.61]  Personal history of DVT (deep vein thrombosis) (Resolved) [Z86.718]  Atrial fibrillation  unspecified type (H) (Resolved) [I48.91]  Paroxysmal atrial fibrillation (H) [I48.0]  Chronic atrial fibrillation (H) [I48.20]  Personal history of PE (pulmonary embolism) [Z86.711]             Comments:  JESSICA rodas to manage by exception             Anticoagulation Care Providers       Provider Role Specialty Phone number    Anya Nowak MD Referring Family Medicine [phone]    Elizabeth Balderas MD Referring HOSPITALIST [phone]

## 2023-11-10 LAB — INR (EXTERNAL): 2.4 (ref 0.9–1.1)

## 2023-11-13 ENCOUNTER — ANTICOAGULATION THERAPY VISIT (OUTPATIENT)
Dept: ANTICOAGULATION | Facility: CLINIC | Age: 73
End: 2023-11-13
Payer: COMMERCIAL

## 2023-11-13 DIAGNOSIS — I48.0 PAROXYSMAL ATRIAL FIBRILLATION (H): ICD-10-CM

## 2023-11-13 DIAGNOSIS — Z79.01 LONG TERM CURRENT USE OF ANTICOAGULANT THERAPY: Primary | ICD-10-CM

## 2023-11-13 DIAGNOSIS — I48.20 CHRONIC ATRIAL FIBRILLATION (H): ICD-10-CM

## 2023-11-13 DIAGNOSIS — Z86.711 PERSONAL HISTORY OF PE (PULMONARY EMBOLISM): ICD-10-CM

## 2023-11-13 NOTE — PROGRESS NOTES
Patient called INR in today from 11/10/23 stating he had trouble reporting this INR.        ANTICOAGULATION MANAGEMENT     Haresh Rock 73 year old male is on warfarin with therapeutic INR result. (Goal INR 2.0-3.0)    Recent labs: (last 7 days)     11/10/23  0000   INR 2.4*       ASSESSMENT     Source(s): Chart Review  Previous INR was Therapeutic last 2(+) visits  Medication, diet, health changes since last INR chart reviewed; none identified         PLAN     Recommended plan for no diet, medication or health factor changes affecting INR     Dosing Instructions: Continue your current warfarin dose with next INR in 4 days       Summary  As of 11/13/2023      Full warfarin instructions:  2.5 mg every Tue, Thu, Sat; 3.75 mg all other days   Next INR check:  11/17/2023               Detailed voice message left for Haresh with dosing instructions and follow up date.   Sent [employer] message with dosing and follow up instructions    Patient to recheck with home meter    Education provided:   Please call back if any changes to your diet, medications or how you've been taking warfarin  Resume manage by exception with home monitor. Continue to submit INR results to home monitor company.You will only be called when your result is out of range. Please call and notify [facility] if new medication started, dose missed, signs or symptoms of bleeding or clotting, or a surgery/procedure is scheduled.    Plan made per [facility] anticoagulation protocol    Mayi Ayala RN  Anticoagulation Clinic  11/13/2023    _______________________________________________________________________     Anticoagulation Episode Summary       Current INR goal:  2.0-3.0   TTR:  86.8% (1 y)   Target end date:  Indefinite   Send INR reminders to:  [employer]    Indications    Long-term (current) use of anticoagulants [Z79.01] [Z79.01]  Atrial fibrillation (H) [I48.91]  Antiphospholipid antibody syndrome (H24) (Resolved) [D68.61]  Personal history of  DVT (deep vein thrombosis) (Resolved) [Z86.718]  Atrial fibrillation  unspecified type (H) (Resolved) [I48.91]  Paroxysmal atrial fibrillation (H) [I48.0]  Chronic atrial fibrillation (H) [I48.20]  Personal history of PE (pulmonary embolism) [Z86.711]             Comments:  JESSICA rodas to manage by exception             Anticoagulation Care Providers       Provider Role Specialty Phone number    Anya Nowak MD Referring Family Medicine [phone]    Elizabeth Balderas MD Referring HOSPITALIST [phone]

## 2023-11-15 DIAGNOSIS — R49.9 VOICE DISORDER: ICD-10-CM

## 2023-11-15 DIAGNOSIS — G20.A1 PARKINSON'S DISEASE, UNSPECIFIED WHETHER DYSKINESIA PRESENT, UNSPECIFIED WHETHER MANIFESTATIONS FLUCTUATE (H): ICD-10-CM

## 2023-11-15 DIAGNOSIS — F80.0 ARTICULATION DISORDER: Primary | ICD-10-CM

## 2023-11-16 DIAGNOSIS — R49.9 VOICE DISORDER: ICD-10-CM

## 2023-11-16 DIAGNOSIS — G20.A1 PARKINSON'S DISEASE, UNSPECIFIED WHETHER DYSKINESIA PRESENT, UNSPECIFIED WHETHER MANIFESTATIONS FLUCTUATE (H): ICD-10-CM

## 2023-11-16 DIAGNOSIS — F80.0 ARTICULATION DISORDER: Primary | ICD-10-CM

## 2023-11-17 ENCOUNTER — ANTICOAGULATION THERAPY VISIT (OUTPATIENT)
Dept: ANTICOAGULATION | Facility: CLINIC | Age: 73
End: 2023-11-17
Payer: COMMERCIAL

## 2023-11-17 DIAGNOSIS — Z79.01 LONG TERM CURRENT USE OF ANTICOAGULANT THERAPY: Primary | ICD-10-CM

## 2023-11-17 DIAGNOSIS — I48.0 PAROXYSMAL ATRIAL FIBRILLATION (H): ICD-10-CM

## 2023-11-17 DIAGNOSIS — I48.20 CHRONIC ATRIAL FIBRILLATION (H): ICD-10-CM

## 2023-11-17 DIAGNOSIS — Z86.711 PERSONAL HISTORY OF PE (PULMONARY EMBOLISM): ICD-10-CM

## 2023-11-17 LAB — INR (EXTERNAL): 3.1 (ref 0.9–1.1)

## 2023-11-17 NOTE — PROGRESS NOTES
ANTICOAGULATION MANAGEMENT     Haresh Rock 73 year old male is on warfarin with supratherapeutic INR result. (Goal INR 2.0-3.0)    Recent labs: (last 7 days)     11/17/23  1457   INR 3.1*       ASSESSMENT     Source(s): Chart Review and Patient/Caregiver Call     Warfarin doses taken: Warfarin taken as instructed  Diet: No new diet changes identified  Medication/supplement changes: None noted  New illness, injury, or hospitalization: No  Signs or symptoms of bleeding or clotting: No  Previous result: Therapeutic last 2(+) visits.  Additional findings: Still working with [facility] to get account issues resolved.       PLAN     Recommended plan for no diet, medication or health factor changes affecting INR     Dosing Instructions: Continue your current warfarin dose with next INR in 1 week       Summary  As of 11/17/2023      Full warfarin instructions:  2.5 mg every Tue, Thu, Sat; 3.75 mg all other days   Next INR check:  11/24/2023               Telephone call with Haresh who verbalizes understanding and agrees to plan    Patient to recheck with home meter    Education provided:   Goal range and lab monitoring: goal range and significance of current result    Plan made per [facility] anticoagulation protocol    Hari Saavedra RN  Anticoagulation Clinic  11/17/2023    _______________________________________________________________________     Anticoagulation Episode Summary       Current INR goal:  2.0-3.0   TTR:  86.7% (1 y)   Target end date:  Indefinite   Send INR reminders to:  [facility]    Indications    Long-term (current) use of anticoagulants [Z79.01] [Z79.01]  Atrial fibrillation (H) [I48.91]  Antiphospholipid antibody syndrome (H24) (Resolved) [D68.61]  Personal history of DVT (deep vein thrombosis) (Resolved) [Z86.718]  Atrial fibrillation  unspecified type (H) (Resolved) [I48.91]  Paroxysmal atrial fibrillation (H) [I48.0]  Chronic atrial fibrillation (H) [I48.20]  Personal history of PE (pulmonary  Here for postpartum visit S/P PTD at 26 weeks after PPROM on August 17th. Is pumping / breast feeding. Voiding w/o issues and BM's back to normal.  Lochia still present but only discharge. Hx of prolonged postpartum bleeding after last delivery.  Taking h embolism) [Z86.051]             Comments:  JESSICA okay to manage by exception             Anticoagulation Care Providers       Provider Role Specialty Phone number    Anya Nowak MD Referring Family Medicine [phone]    Elizabeth Balderas MD Referring HOSPITALIST [phone]

## 2023-11-22 ENCOUNTER — TELEPHONE (OUTPATIENT)
Dept: FAMILY MEDICINE | Facility: CLINIC | Age: 73
End: 2023-11-22
Payer: COMMERCIAL

## 2023-11-22 DIAGNOSIS — K92.0 HEMATEMESIS WITH NAUSEA: ICD-10-CM

## 2023-11-24 NOTE — TELEPHONE ENCOUNTER
Please help pt schedule for annual visit so we can pooja karen refill.     Thanks,  KOSTAS Harry  [facility]

## 2023-11-24 NOTE — TELEPHONE ENCOUNTER
Patient scheduled.     Marcela Marks -    [facility]     Pt now has pending for next week to discuss also having some congestion

## 2023-11-28 ENCOUNTER — TELEPHONE (OUTPATIENT)
Dept: FAMILY MEDICINE | Facility: CLINIC | Age: 73
End: 2023-11-28
Payer: COMMERCIAL

## 2023-11-28 ENCOUNTER — PATIENT OUTREACH (OUTPATIENT)
Dept: CARE COORDINATION | Facility: CLINIC | Age: 73
End: 2023-11-28
Payer: COMMERCIAL

## 2023-11-28 PROBLEM — R94.02 ABNORMAL BRAIN SCAN: Status: RESOLVED | Noted: 2020-11-04 | Resolved: 2023-11-28

## 2023-11-28 PROBLEM — F80.0 ARTICULATION DISORDER: Status: RESOLVED | Noted: 2020-09-23 | Resolved: 2023-11-28

## 2023-11-28 PROBLEM — I50.30 HEART FAILURE WITH PRESERVED EJECTION FRACTION, NYHA CLASS I (H): Status: ACTIVE | Noted: 2023-11-28

## 2023-11-28 PROBLEM — I48.20 CHRONIC ATRIAL FIBRILLATION (H): Status: RESOLVED | Noted: 2022-06-20 | Resolved: 2023-11-28

## 2023-11-28 ASSESSMENT — ENCOUNTER SYMPTOMS
HEMATOCHEZIA: 0
ABDOMINAL PAIN: 0
CONSTIPATION: 0
FEVER: 0
NERVOUS/ANXIOUS: 0
SHORTNESS OF BREATH: 0
COUGH: 0
PARESTHESIAS: 0
HEADACHES: 0
MYALGIAS: 0
JOINT SWELLING: 0
HEARTBURN: 0
NAUSEA: 0
HEMATURIA: 0
DIARRHEA: 0
WEAKNESS: 0
DYSURIA: 0
SORE THROAT: 0
EYE PAIN: 0
ARTHRALGIAS: 0
FREQUENCY: 1
CHILLS: 0
DIZZINESS: 0
PALPITATIONS: 0

## 2023-11-28 ASSESSMENT — ACTIVITIES OF DAILY LIVING (ADL): CURRENT_FUNCTION: NO ASSISTANCE NEEDED

## 2023-11-28 NOTE — PROGRESS NOTES
[facility]/Voicemail    Clinical Data: Care Coordinator Outreach    Outreach Documentation Number of Outreach Attempt   10/25/2023  12:25 PM 2   11/28/2023   2:18 PM 1       Left message on patient's voicemail with call back information and requested return call.    Plan:   Care Coordinator will try to reach patient again in 3-5 business days.    Next outreach due: 12/4/23

## 2023-11-28 NOTE — TELEPHONE ENCOUNTER
General Call      Reason for Call:  INR    What are your questions or concerns:  patients INR on Sunday was 3.0    Date of last appointment with provider: 10-11-22    Could we send this information to you in [place] or would you prefer to receive a phone call?:   Patient would like to be contacted via [place]

## 2023-11-28 NOTE — TELEPHONE ENCOUNTER
No call back from patient- ACN left a message to call result to the home monitoring company and [facility] will call him once result is received.    Advised to continue current warfarin doses at this time.

## 2023-11-29 ENCOUNTER — ANTICOAGULATION THERAPY VISIT (OUTPATIENT)
Dept: ANTICOAGULATION | Facility: CLINIC | Age: 73
End: 2023-11-29

## 2023-11-29 ENCOUNTER — OFFICE VISIT (OUTPATIENT)
Dept: FAMILY MEDICINE | Facility: CLINIC | Age: 73
End: 2023-11-29
Payer: COMMERCIAL

## 2023-11-29 VITALS
TEMPERATURE: 97.5 F | RESPIRATION RATE: 19 BRPM | HEIGHT: 71 IN | BODY MASS INDEX: 24.78 KG/M2 | HEART RATE: 70 BPM | SYSTOLIC BLOOD PRESSURE: 106 MMHG | OXYGEN SATURATION: 96 % | WEIGHT: 177 LBS | DIASTOLIC BLOOD PRESSURE: 66 MMHG

## 2023-11-29 DIAGNOSIS — K74.60 CIRRHOSIS OF LIVER NOT DUE TO ALCOHOL (H): ICD-10-CM

## 2023-11-29 DIAGNOSIS — E11.22 TYPE 2 DIABETES MELLITUS WITH STAGE 2 CHRONIC KIDNEY DISEASE, WITHOUT LONG-TERM CURRENT USE OF INSULIN (H): ICD-10-CM

## 2023-11-29 DIAGNOSIS — Z00.00 ENCOUNTER FOR MEDICARE ANNUAL WELLNESS EXAM: Primary | ICD-10-CM

## 2023-11-29 DIAGNOSIS — Z23 NEED FOR SHINGLES VACCINE: ICD-10-CM

## 2023-11-29 DIAGNOSIS — Z94.81 BONE MARROW TRANSPLANT STATUS (H): ICD-10-CM

## 2023-11-29 DIAGNOSIS — Z79.01 LONG TERM CURRENT USE OF ANTICOAGULANT THERAPY: Primary | ICD-10-CM

## 2023-11-29 DIAGNOSIS — Z93.1 GASTROSTOMY TUBE IN PLACE (H): ICD-10-CM

## 2023-11-29 DIAGNOSIS — Z29.11 NEED FOR VACCINATION AGAINST RESPIRATORY SYNCYTIAL VIRUS: ICD-10-CM

## 2023-11-29 DIAGNOSIS — E83.110 HEREDITARY HEMOCHROMATOSIS (H): ICD-10-CM

## 2023-11-29 DIAGNOSIS — E78.5 HYPERLIPIDEMIA LDL GOAL <70: ICD-10-CM

## 2023-11-29 DIAGNOSIS — Z86.711 PERSONAL HISTORY OF PE (PULMONARY EMBOLISM): ICD-10-CM

## 2023-11-29 DIAGNOSIS — D89.811 GRAFT-VERSUS-HOST DISEASE, CHRONIC (H): ICD-10-CM

## 2023-11-29 DIAGNOSIS — N18.2 TYPE 2 DIABETES MELLITUS WITH STAGE 2 CHRONIC KIDNEY DISEASE, WITHOUT LONG-TERM CURRENT USE OF INSULIN (H): ICD-10-CM

## 2023-11-29 DIAGNOSIS — D69.6 THROMBOCYTOPENIA (H): ICD-10-CM

## 2023-11-29 DIAGNOSIS — G20.A1 PARKINSON'S DISEASE, UNSPECIFIED WHETHER DYSKINESIA PRESENT, UNSPECIFIED WHETHER MANIFESTATIONS FLUCTUATE (H): ICD-10-CM

## 2023-11-29 DIAGNOSIS — Z23 NEED FOR TDAP VACCINATION: ICD-10-CM

## 2023-11-29 DIAGNOSIS — I48.0 PAROXYSMAL ATRIAL FIBRILLATION (H): ICD-10-CM

## 2023-11-29 DIAGNOSIS — Z23 NEED FOR PROPHYLACTIC VACCINATION AGAINST HEPATITIS A: ICD-10-CM

## 2023-11-29 PROBLEM — I50.30 HEART FAILURE WITH PRESERVED EJECTION FRACTION, NYHA CLASS I (H): Status: RESOLVED | Noted: 2023-11-28 | Resolved: 2023-11-29

## 2023-11-29 PROBLEM — L97.521: Status: RESOLVED | Noted: 2023-09-11 | Resolved: 2023-11-29

## 2023-11-29 LAB
CREAT UR-MCNC: 133 MG/DL
INR (EXTERNAL): 3 (ref 0.9–1.01)
MICROALBUMIN UR-MCNC: 52.4 MG/L
MICROALBUMIN/CREAT UR: 39.4 MG/G CR (ref 0–17)

## 2023-11-29 PROCEDURE — G0439 PPPS, SUBSEQ VISIT: HCPCS | Performed by: FAMILY MEDICINE

## 2023-11-29 PROCEDURE — 82043 UR ALBUMIN QUANTITATIVE: CPT | Performed by: FAMILY MEDICINE

## 2023-11-29 PROCEDURE — 82570 ASSAY OF URINE CREATININE: CPT | Performed by: FAMILY MEDICINE

## 2023-11-29 RX ORDER — RESPIRATORY SYNCYTIAL VIRUS VACCINE 120MCG/0.5
0.5 KIT INTRAMUSCULAR ONCE
Qty: 1 EACH | Refills: 0 | Status: SHIPPED | OUTPATIENT
Start: 2023-11-29 | End: 2023-11-29

## 2023-11-29 ASSESSMENT — ENCOUNTER SYMPTOMS
SHORTNESS OF BREATH: 0
HEARTBURN: 0
SORE THROAT: 0
CHILLS: 0
EYE PAIN: 0
WEAKNESS: 0
PARESTHESIAS: 0
JOINT SWELLING: 0
FEVER: 0
HEADACHES: 0
NAUSEA: 0
HEMATOCHEZIA: 0
NERVOUS/ANXIOUS: 0
HEMATURIA: 0
CONSTIPATION: 0
DYSURIA: 0
MYALGIAS: 0
PALPITATIONS: 0
COUGH: 0
FREQUENCY: 1
DIARRHEA: 0
DIZZINESS: 0
ABDOMINAL PAIN: 0
ARTHRALGIAS: 0

## 2023-11-29 ASSESSMENT — ACTIVITIES OF DAILY LIVING (ADL): CURRENT_FUNCTION: NO ASSISTANCE NEEDED

## 2023-11-29 NOTE — PATIENT INSTRUCTIONS
"Patient Education   Personalized Prevention Plan  You are due for the preventive services outlined below.  Your care team is available to assist you in scheduling these services.  If you have already completed any of these items, please share that information with your care team to update in your medical record.  Health Maintenance Due   Topic Date Due     Heart Failure Action Plan  Never done     Hepatitis A Vaccine (1 of 2 - Risk 2-dose series) Never done     RSV VACCINE (Pregnancy & 60+) (1 - 1-dose 60+ series) Never done     Zoster (Shingles) Vaccine (1 of 2) 11/14/2016     Polio Vaccine (3 of 3 - Adult catch-up series) 03/19/2017     Diptheria Tetanus Pertussis (DTAP/TDAP/TD) Vaccine (2 - Td or Tdap) 09/11/2022     ANNUAL REVIEW OF HM ORDERS  06/08/2023     A1C Lab  08/25/2023     Cholesterol Lab  10/11/2023     Kidney Microalbumin Urine Test  10/11/2023     Diabetic Foot Exam  10/11/2023     COVID-19 Vaccine (8 - 2023-24 season) 11/28/2023     Learning About Being Physically Active  What is physical activity?     Being physically active means doing any kind of activity that gets your body moving.  The types of physical activity that can help you get fit and stay healthy include:  Aerobic or \"cardio\" activities. These make your heart beat faster and make you breathe harder, such as brisk walking, riding a bike, or running. They strengthen your heart and lungs and build up your endurance.  Strength training activities. These make your muscles work against, or \"resist,\" something. Examples include lifting weights or doing push-ups. These activities help tone and strengthen your muscles and bones.  Stretches. These let you move your joints and muscles through their full range of motion. Stretching helps you be more flexible.  Reaching a balance between these three types of physical activity is important because each one contributes to your overall fitness.  What are the benefits of being active?  Being active is " "one of the best things you can do for your health. It helps you to:  Feel stronger and have more energy to do all the things you like to do.  Focus better at school or work.  Feel, think, and sleep better.  Reach and stay at a healthy weight.  Lose fat and build lean muscle.  Lower your risk for serious health problems, including diabetes, heart attack, high blood pressure, and some cancers.  Keep your heart, lungs, bones, muscles, and joints strong and healthy.  How can you make being active part of your life?  Start slowly. Make it your long-term goal to get at least 30 minutes of exercise on most days of the week. Walking is a good choice. You also may want to do other activities, such as running, swimming, cycling, or playing tennis or team sports.  Pick activities that you like--ones that make your heart beat faster, your muscles stronger, and your muscles and joints more flexible. If you find more than one thing you like doing, do them all. You don't have to do the same thing every day.  Get your heart pumping every day. Any activity that makes your heart beat faster and keeps it at that rate for a while counts.  Here are some great ways to get your heart beating faster:  Go for a brisk walk, run, or hike.  Go for a swim or bike ride.  Take an online exercise class or dance.  Play a game of touch football, basketball, or soccer.  Play tennis, pickleball, or racquetball.  Climb stairs.  Even some household chores can be aerobic. Just do them at a faster pace. Raking or mowing the lawn, sweeping the garage, and vacuuming and cleaning your home all can help get your heart rate up.  Strengthen your muscles during the week. You don't have to lift heavy weights or grow big, bulky muscles to get stronger. Doing a few simple activities that make your muscles work against, or \"resist,\" something can help you get stronger. Aim for at least twice a week.  For example, you can:  Do push-ups or sit-ups, which use your own " "body weight as resistance.  Lift weights or dumbbells or use stretch bands at home or in a gym or community center.  Stretch your muscles often. Stretching will help you as you become more active. It can help you stay flexible and loosen tight muscles. It can also help improve your balance and posture and can be a great way to relax.  Be sure to stretch the muscles you'll be using when you work out. It's best to warm your muscles slightly before you stretch them. Walk or do some other light aerobic activity for a few minutes. Then start stretching.  When you stretch your muscles:  Do it slowly. Stretching is not about going fast or making sudden movements.  Don't push or bounce during a stretch.  Hold each stretch for at least 15 to 30 seconds, if you can. You should feel a stretch in the muscle, but not pain.  Breathe out as you do the stretch. Then breathe in as you hold the stretch. Don't hold your breath.  If you're worried about how more activity might affect your health, have a checkup before you start. Follow any special advice your doctor gives you for getting a smart start.  Where can you learn more?  Go to https://www.[employer].net/patiented  Enter W332 in the search box to learn more about \"Learning About Being Physically Active.\"  Current as of: June 6, 2023               Content Version: 13.8    0241-9417 [employer].   Care instructions adapted under license by your healthcare professional. If you have questions about a medical condition or this instruction, always ask your healthcare professional. [employer] disclaims any warranty or liability for your use of this information.      Learning About Dietary Guidelines  What are the Dietary Guidelines for Americans?     Dietary Guidelines for Americans provide tips for eating well and staying healthy. This helps reduce the risk for long-term (chronic) diseases.  These guidelines recommend that you:  Eat and drink the right " amount for you. The [place] government's food guide is called MyPlate. It can help you make your own well-balanced eating plan.  Try to balance your eating with your activity. This helps you stay at a healthy weight.  Drink alcohol in moderation, if at all.  Limit foods high in salt, saturated fat, trans fat, and added sugar.  These guidelines are from the [place] Department of Agriculture and the U.S[place] Department of Health and Human Services. They are updated every 5 years.  What is MyPlate?  MyPlate is the [place] government's food guide. It can help you make your own well-balanced eating plan. A balanced eating plan means that you eat enough, but not too much, and that your food gives you the nutrients you need to stay healthy.  MyPlate focuses on eating plenty of whole grains, fruits, and vegetables, and on limiting fat and sugar. It is available online at www.ChooseMyPlate.gov.  How can you get started?  If you're trying to eat healthier, you can slowly change your eating habits over time. You don't have to make big changes all at once. Start by adding one or two healthy foods to your meals each day.  Grains  Choose whole-grain breads and cereals and whole-wheat pasta and whole-grain crackers.  Vegetables  Eat a variety of vegetables every day. They have lots of nutrients and are part of a heart-healthy diet.  Fruits  Eat a variety of fruits every day. Fruits contain lots of nutrients. Choose fresh fruit instead of fruit juice.  Protein foods  Choose fish and lean poultry more often. Eat red meat and fried meats less often. Dried beans, tofu, and nuts are also good sources of protein.  Dairy  Choose low-fat or fat-free products from this food group. If you have problems digesting milk, try eating cheese or yogurt instead.  Fats and oils  Limit fats and oils if you're trying to cut calories. Choose healthy fats when you cook. These include canola oil and olive oil.  Where can you learn more?  Go to  "https://www.[employer].net/patiented  Enter D676 in the search box to learn more about \"Learning About Dietary Guidelines.\"  Current as of: February 28, 2023               Content Version: 13.8 2006-2023 [employer].   Care instructions adapted under license by your healthcare professional. If you have questions about a medical condition or this instruction, always ask your healthcare professional. [employer], "[employer] disclaims any warranty or liability for your use of this information.         "

## 2023-11-29 NOTE — PROGRESS NOTES
ANTICOAGULATION MANAGEMENT     Haresh Rock 73 year old male is on warfarin with therapeutic INR result. (Goal INR 2.0-3.0)    Recent labs: (last 7 days)     11/26/23  0000   INR 3.0*       ASSESSMENT     Source(s): Chart Review and Patient/Caregiver Call     Warfarin doses taken: Warfarin taken as instructed  Diet: No new diet changes identified  Medication/supplement changes: None noted  New illness, injury, or hospitalization: No  Signs or symptoms of bleeding or clotting: No  Previous result: Supratherapeutic  Additional findings:  per patient he is still having issues reporting INR result to home monitoring company.        PLAN     Recommended plan for no diet, medication or health factor changes affecting INR     Dosing Instructions: Continue your current warfarin dose with next INR in 6 days   - advised to call result to [facility] if still having issues with reporting result to [facility] INR    Summary  As of 11/29/2023      Full warfarin instructions:  2.5 mg every Tue, Thu, Sat; 3.75 mg all other days   Next INR check:  12/4/2023               Telephone call with Haresh who verbalizes understanding and agrees to plan and who agrees to plan and repeated back plan correctly    Patient to recheck with home meter    Education provided:   Contact [phone]  with any changes, questions or concerns.     Plan made per [facility] anticoagulation protocol    Shanice Dhillon RN  Anticoagulation Clinic  11/29/2023    _______________________________________________________________________     Anticoagulation Episode Summary       Current INR goal:  2.0-3.0   TTR:  86.5% (1 y)   Target end date:  Indefinite   Send INR reminders to:  [facility]    Indications    Long-term (current) use of anticoagulants [Z79.01] [Z79.01]  Atrial fibrillation (H) (Resolved) [I48.91]  Antiphospholipid antibody syndrome (H24) (Resolved) [D68.61]  Personal history of DVT (deep vein thrombosis) (Resolved) [Z86.718]  Atrial fibrillation  unspecified  type (H) (Resolved) [I48.91]  Paroxysmal atrial fibrillation (H) [I48.0]  Chronic atrial fibrillation (H) (Resolved) [I48.20]  Personal history of PE (pulmonary embolism) [Z86.711]             Comments:  JESSICA rodas to manage by exception             Anticoagulation Care Providers       Provider Role Specialty Phone number    Anya Nowak MD Referring Family Medicine [phone]    Elizabeth Balderas MD Referring HOSPITALIST [phone]

## 2023-11-29 NOTE — TELEPHONE ENCOUNTER
Please see 11/29/2023 Anticoagulation Therapy encounter for further information    Shnaice Dhillon RN [facility]

## 2023-11-29 NOTE — PROGRESS NOTES
"SUBJECTIVE:   Haresh is a 73 year old, presenting for the following:  Physical        11/29/2023     9:33 AM   Additional Questions   Roomed by Crystal   Accompanied by wife       Are you in the first 12 months of your Medicare coverage?  No    He is also followed by neurology for Parkinson's disease and is unfortunately dependent on G tube feedings due to dysphagia.     He has Hx of diet controlled diabetes, hypercholesterolemia, a fib, and hereditary hemochromatosis. He is s/p BMT for lymphoma, followed by hematology.     Healthy Habits:     In general, how would you rate your overall health?  Good    Frequency of exercise:  None    Do you usually eat at least 4 servings of fruit and vegetables a day, include whole grains    & fiber and avoid regularly eating high fat or \"junk\" foods?  No    Taking medications regularly:  Yes    Medication side effects:  None    Ability to successfully perform activities of daily living:  No assistance needed    Home Safety:  No safety concerns identified    Hearing Impairment:  No hearing concerns    In the past 6 months, have you been bothered by leaking of urine?  No    In general, how would you rate your overall mental or emotional health?  Good    Additional concerns today:  No      Today's PHQ-2 Score:       11/28/2023     5:20 PM   PHQ-2 ( 1999 [employer])   Q1: Little interest or pleasure in doing things 0   Q2: Feeling down, depressed or hopeless 0   PHQ-2 Score 0   Q1: Little interest or pleasure in doing things Not at all   Q2: Feeling down, depressed or hopeless Not at all   PHQ-2 Score 0           Have you ever done Advance Care Planning? (For example, a Health Directive, POLST, or a discussion with a medical provider or your loved ones about your wishes): Yes, advance care planning is on file.      Fall risk  Fallen 2 or more times in the past year?: No  Any fall with injury in the past year?: No    Cognitive Screening   1) Repeat 3 items (Leader, Season, Table)  yes  2) " Clock draw: NORMAL  3) 3 item recall: Recalls 3 objects  Results: 3 items recalled: COGNITIVE IMPAIRMENT LESS LIKELY    Mini-CogTM Copyright S Kris. Licensed by the author for use in [facility]; reprinted with permission ([email or web address]). All rights reserved.      Do you have sleep apnea, excessive snoring or daytime drowsiness? : no    Reviewed and updated as needed this visit by clinical staff   Tobacco  Allergies  Meds  Problems  Med Hx  Surg Hx  Fam Hx          Reviewed and updated as needed this visit by Provider   Tobacco  Allergies  Meds  Problems  Med Hx  Surg Hx  Fam Hx         Social History     Tobacco Use    Smoking status: Never    Smokeless tobacco: Never   Substance Use Topics    Alcohol use: No             11/28/2023     5:19 PM   Alcohol Use   Prescreen: >3 drinks/day or >7 drinks/week? Not Applicable          No data to display              Do you have a current opioid prescription? No  Do you use any other controlled substances or medications that are not prescribed by a provider? None     Current providers sharing in care for this patient include:    Patient Care Team:  Anya Nowak MD as PCP - General (Family Practice)  Augusto Miller MD as MD (Hematology)  Jesusita Mejia PA-C as Physician Assistant  Pino Sharma MD as MD (Internal Medicine)  Fabi Jimenez MD as MD (INTERNAL MEDICINE - ENDOCRINOLOGY, DIABETES & METABOLISM)  Bekah Matt MD as MD (Cardiology)  Tina Mejia, KOSTAS as Specialty Care Coordinator (Cardiology)  Bekah Matt MD as Assigned Heart and Vascular Provider  Ángel Hill MD as Assigned Neuroscience Provider  Rosalva Samuels MD as MD (Family Medicine)  Bekah Matt MD as MD (Clinical Cardiac Electrophysiology)  Uriel Van RN as Lead Care Coordinator (Primary Care - CC)  Lizbeth Chen RP as Pharmacist (Pharmacist Ambulatory Care)  Leslie Clifford [facility] as Pharmacist  (Pharmacist)  Bonnie Walls, RN as Specialty Care Coordinator (Hematology & Oncology)  Maye Rain CHW as Community Health Worker (Primary Care - CC)  Reg Cantu MD as MD (Critical Care)  Reg Cantu MD as Assigned Surgical Provider  Augusto Miller MD as Assigned Cancer Care Provider  Nayana Hummel PA-C as Assigned PCP  Lizbeth Chen RPH as Assigned MTM Pharmacist    The following health maintenance items are reviewed in Epic and correct as of today:  Health Maintenance   Topic Date Due    HEPATITIS A IMMUNIZATION (1 of 2 - Risk 2-dose series) Never done    RSV VACCINE (Pregnancy & 60+) (1 - 1-dose 60+ series) Never done    ZOSTER IMMUNIZATION (1 of 2) 11/14/2016    IPV IMMUNIZATION (3 of 3 - Adult catch-up series) 03/19/2017    DTAP/TDAP/TD IMMUNIZATION (2 - Td or Tdap) 09/11/2022    A1C  08/25/2023    LIPID  10/11/2023    MICROALBUMIN  10/11/2023    DIABETIC FOOT EXAM  10/11/2023    COVID-19 Vaccine (8 - 2023-24 season) 11/28/2023    HF ACTION PLAN  11/29/2033 (Originally 1950)    CBC  01/09/2024    EYE EXAM  02/13/2024    BMP  04/09/2024    ALT  10/09/2024    CMP  10/09/2024    MEDICARE ANNUAL WELLNESS VISIT  11/29/2024    ANNUAL REVIEW OF HM ORDERS  11/29/2024    FALL RISK ASSESSMENT  11/29/2024    ADVANCE CARE PLANNING  11/29/2028    TSH W/FREE T4 REFLEX  Completed    HEPATITIS C SCREENING  Completed    PHQ-2 (once per calendar year)  Completed    INFLUENZA VACCINE  Completed    Pneumococcal Vaccine: 65+ Years  Completed    URINALYSIS  Completed    HEPATITIS B IMMUNIZATION  Completed    AORTIC ANEURYSM SCREENING (SYSTEM ASSIGNED)  Completed    HPV IMMUNIZATION  Aged Out    MENINGITIS IMMUNIZATION  Aged Out    RSV MONOCLONAL ANTIBODY  Aged Out    COLORECTAL CANCER SCREENING  Discontinued     Patient Active Problem List   Diagnosis    Prmjm-jbqlpw-vupa disease, chronic (H)    Polyneuropathy    History of bilateral knee replacement    Bone marrow transplant status  (H)    Hyperlipidemia LDL goal <70    Chronic rhinitis    Gallstones without obstruction of gallbladder    Long-term (current) use of anticoagulants [Z79.01]    Type 2 diabetes mellitus with stage 2 chronic kidney disease, without long-term current use of insulin (H)    History of Hodgkin's lymphoma    History of irradiation, presenting hazards to health    Hereditary hemochromatosis (H24)    Oropharyngeal dysphagia    Personal history of PE (pulmonary embolism)    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)    Thrombocytopenia (H24)    Paroxysmal atrial fibrillation (H)    Benign prostatic hyperplasia with urinary retention    Constipation    Parkinson's disease    Gastrostomy tube in place (H)    Neuropathic ulcer of toe, left, limited to breakdown of skin (H)    Hammertoe, bilateral     Past Surgical History:   Procedure Laterality Date    ANESTHESIA CARDIOVERSION  04/21/2011    Procedure:ANESTHESIA CARDIOVERSION; Surgeon:GENERIC ANESTHESIA PROVIDER; Location:[facility] OR    ARTHROSCOPY KNEE RT/LT      LT    CATARACT IOL, RT/LT  2017    COLONOSCOPY  10/16/2012    Procedure: COLONOSCOPY;  Colonoscopy, screening;  Surgeon: Reg Feliciano MD;  Location: [facility] OR    COLONOSCOPY N/A 04/14/2021    Procedure: COLONOSCOPY;  Surgeon: Reg Holm MD;  Location: [facility] GI    ESOPHAGOSCOPY, GASTROSCOPY, DUODENOSCOPY (EGD), COMBINED N/A 10/07/2020    Procedure: ESOPHAGOGASTRODUODENOSCOPY, WITH BIOPSY;  Surgeon: Raul Sawyer DO;  [place] [facility] [place]    H ABLATION FOCAL AFIB  03/05/2013    PVI    H ABLATION FOCAL AFIB  01/01/2018    HC BONE MARROW/STEM TRANSPLANT ALLOGENIC  05/08/2007    INSERT PORT VASCULAR ACCESS      IR GASTROSTOMY TUBE PERCUTANEOUS PLCMNT  10/25/2021    JOINT REPLACEMTN, KNEE RT/LT  03/28/2012    SHAWN    LAPAROSCOPY DIAGNOSTIC (GENERAL) N/A 10/29/2021    Procedure: LAPAROSCOPY, DIAGNOSTIC, TAKEDOWN OF MALPOSITIONED GASTROSTOMY TUBE, INSERTION OF GASTROSTOMY TUBE, SMALL BOWEL RESECTION X1, PRIMARY REPAIR OF  SMALL BOWEL ENTEROTOMY, ABDOMINAL WASHOUT, TAP BLOCK;  Surgeon: Leif Finney DO;  Location: [facility] OR    PEG TUBE PLACEMENT  10/25/2021    VASECTOMY  1990       Social History     Tobacco Use    Smoking status: Never    Smokeless tobacco: Never   Substance Use Topics    Alcohol use: No     Family History   Problem Relation Age of Onset    Hypertension Mother     Cerebrovascular Disease Mother     Breast Cancer Mother     Alzheimer Disease Father     Arrhythmia Sister         supraventricular tachycardia    Thyroid Disease Sister     Pneumonia Sister         covid19    Other - See Comments Sister         lives in [place]    Arrhythmia Brother     Prostate Cancer Brother     Other - See Comments Brother         [place]    Cancer Brother         lymphome or bone cancer    Gastrointestinal Disease Maternal Grandmother         colitis     Thyroid Cancer Daughter         had thyroid removed    Bipolar Disorder Daughter     Other - See Comments Daughter         lives in [place] - single with one son 9  yrs    Thyroid Disease Daughter     Other - See Comments Son         lives in [place]. no kids and not .    Obsessive Compulsive Disorder Son     Depression Son     Eating Disorder Son         eats when depressed    Obesity Son     Diabetes Paternal Aunt     Parkinsonism Other         2 cousins with parkinson    C.A.D. No family hx of          Current Outpatient Medications   Medication Sig Dispense Refill    amoxicillin (AMOXIL) 500 MG capsule Take 2,000 mg by mouth once Take 1 hour prior to dental procedure      carbidopa-levodopa (SINEMET)  MG tablet 1 tab 3/day at 8am, 2pm and 8pm 270 tablet 3    flecainide (TAMBOCOR) 50 MG tablet TAKE 1 TABLET TWICE A  tablet 2    hepatitis A vaccine (VAQTA) 50 UNIT/ML injection Inject 1 mL (50 Units) into the muscle once for 1 dose 1 mL 0    loratadine (CLARITIN REDITABS) 10 MG ODT Take 10 mg by mouth daily      metoprolol tartrate  (LOPRESSOR) 25 MG tablet TAKE ONE-HALF (1/2) TABLET (12.5 MG) ORALLY OR BY FEEDING TUBE TWICE A DAY (CRUSH TABLET) 90 tablet 0    NONFORMULARY Abbott osmolyte feedings 2 cans 3/day      omeprazole (PRILOSEC) 20 MG DR capsule TAKE 1 CAPSULE EVERY MORNING 90 capsule 0    PREVIDENT 5000 DRY MOUTH 1.1 % GEL topical gel Apply to affected area daily as needed Or brushes with water  3    respiratory syncytial virus vaccine, bivalent (ABRYSVO) injection Inject 0.5 mLs into the muscle once for 1 dose 1 each 0    rosuvastatin (CRESTOR) 40 MG tablet Take 1 tablet (40 mg) by mouth At Bedtime 90 tablet 3    Tdap, tetanus-diptheria-acell pertussis, (BOOSTRIX) 5-2.5-18.5 LF-MCG/0.5 ABDULAZIZ injection Inject 0.5 mLs into the muscle once for 1 dose 0.5 mL 0    warfarin ANTICOAGULANT (COUMADIN) 2.5 MG tablet TAKE 1 TABLET (2.5 MG) by mouth every Tue, Fri; and take ONE AND ONE-HALF TABLETS (3.75 MG) ALL OTHER DAYS OR AS DIRECTED BY ANTICOAGULATION CLINIC 180 tablet 1    zoster vaccine recombinant adjuvanted (SHINGRIX) injection Inject 0.5 mLs into the muscle once for 1 dose Pharmacist administered 0.5 mL 0     Allergies   Allergen Reactions    Seasonal Allergies              Review of Systems   Constitutional:  Negative for chills and fever.   HENT:  Negative for congestion, ear pain, hearing loss and sore throat.    Eyes:  Negative for pain and visual disturbance.   Respiratory:  Negative for cough and shortness of breath.    Cardiovascular:  Negative for chest pain, palpitations and peripheral edema.   Gastrointestinal:  Negative for abdominal pain, constipation, diarrhea, heartburn, hematochezia and nausea.   Genitourinary:  Positive for frequency and impotence. Negative for dysuria, genital sores, hematuria, penile discharge and urgency.   Musculoskeletal:  Negative for arthralgias, joint swelling and myalgias.   Skin:  Negative for rash.   Neurological:  Negative for dizziness, weakness, headaches and paresthesias.  "  Psychiatric/Behavioral:  Negative for mood changes. The patient is not nervous/anxious.          OBJECTIVE:   /66   Pulse 70   Temp 97.5  F (36.4  C) (Oral)   Resp 19   Ht 1.803 m (5' 11\")   Wt 80.3 kg (177 lb)   SpO2 96%   BMI 24.69 kg/m   Estimated body mass index is 24.69 kg/m  as calculated from the following:    Height as of this encounter: 1.803 m (5' 11\").    Weight as of this encounter: 80.3 kg (177 lb).  Physical Exam  GENERAL: alert, no distress, frail, elderly, fatigued, and appears older than stated age  NECK: no adenopathy, no asymmetry, masses, or scars and thyroid normal to palpation  RESP: lungs clear to auscultation - no rales, rhonchi or wheezes  CV: regular rate and rhythm, normal S1 S2, no S3 or S4, no murmur, click or rub, no peripheral edema    MS: no gross musculoskeletal defects noted, no edema  PSYCH: mentation appears normal, affect normal/bright    Diagnostic Test Results:  Labs reviewed in Epic    ASSESSMENT / PLAN:   (Z00.00) Encounter for Medicare annual wellness exam  (primary encounter diagnosis)    (G20.A1) Parkinson's disease, unspecified whether dyskinesia present, unspecified whether manifestations fluctuate  (Z93.1) Gastrostomy tube in place (H)  Comment: stable   Plan: OFFICE/OUTPT VISIT,EST,LEVL IV        Continue plan per neurology     (E11.22,  N18.2) Type 2 diabetes mellitus with stage 2 chronic kidney disease, without long-term current use of insulin (H)  (E78.5) Hyperlipidemia LDL goal <70  Plan: OFFICE/OUTPT VISIT,EST,LEVL IV        Labs recommended     (I48.0) Paroxysmal atrial fibrillation (H)  Comment: rate controlled and anticoagulated   Plan: OFFICE/OUTPT VISIT,EST,LEVL IV          (E83.110) Hereditary hemochromatosis (H24)  (K74.60) 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 He has never used smokeless tobacco.      Appropriate preventive services were discussed with this patient, including applicable screening as appropriate for fall prevention, nutrition, physical activity, Tobacco-use cessation, weight loss and cognition.  Checklist reviewing preventive services available has been given to the patient.    Reviewed patients plan of care and provided an AVS. The Intermediate Care Plan ( asthma action plan, low back pain action plan, and migraine action plan) for Haresh meets the Care Plan requirement. This Care Plan has been established and reviewed with the Patient and wife .          Anya Nowak MD  [facility]    Identified Health Risks:  I have reviewed Opioid Use Disorder and Substance Use Disorder risk factors and made any needed referrals. He is at risk for lack of exercise and has been provided with information to increase physical activity for the benefit of his well-being.  The patient was counseled and encouraged to consider modifying their diet and eating habits. He was provided with information on recommended healthy diet options.

## 2023-11-30 NOTE — RESULT ENCOUNTER NOTE
A small amount of protein in your urine is consistent with mild kidney disease. Let's follow this yearly.     Anya Nowak MD

## 2023-12-05 ENCOUNTER — THERAPY VISIT (OUTPATIENT)
Dept: OCCUPATIONAL THERAPY | Facility: CLINIC | Age: 73
End: 2023-12-05
Attending: PSYCHIATRY & NEUROLOGY
Payer: COMMERCIAL

## 2023-12-05 DIAGNOSIS — G20.A1 PARKINSON'S DISEASE WITHOUT DYSKINESIA, UNSPECIFIED WHETHER MANIFESTATIONS FLUCTUATE (H): Primary | ICD-10-CM

## 2023-12-05 DIAGNOSIS — R27.8 IMPAIRED COORDINATION OF UPPER EXTREMITY: ICD-10-CM

## 2023-12-05 PROCEDURE — 97112 NEUROMUSCULAR REEDUCATION: CPT | Mod: 59 | Performed by: OCCUPATIONAL THERAPIST

## 2023-12-05 PROCEDURE — 97530 THERAPEUTIC ACTIVITIES: CPT | Mod: GO | Performed by: OCCUPATIONAL THERAPIST

## 2023-12-05 PROCEDURE — 97165 OT EVAL LOW COMPLEX 30 MIN: CPT | Mod: GO | Performed by: OCCUPATIONAL THERAPIST

## 2023-12-05 NOTE — PROGRESS NOTES
OCCUPATIONAL THERAPY EVALUATION  Type of Visit: Evaluation    See electronic medical record for Abuse and Falls Screening details.    Subjective     Patient referred to OP OT for evaluation of the BIG and LOUD and/or amplitude program.  Haresh was diagnosed with PD in fall of 2020, had outpatient OT in the summer of 2021.  He reports mild resting tremors, which is interfering with his ability to write, button, use the computer.    Patient takes Sinemet 3x/day (by mouth for the first two doses and crush the last one of the day through the feeding tube) and needs to sit for 1 hour for each tube feeding.  He does not exercise and reports changes with coordination, walking, posture, slowness and difficulty with getting on a shirt and donning LE clothes, difficulty with wiping after a bowel movement and changes with functional mobility/bed mobility.    Time of last PD med: 7:15 a.m. Time of next PD med: 2 p.m.. On/Off presentation/symptoms: Denies.    Motor symptoms: mild tremor, bradykinesia, hyokinesia rigidity, micrographia, decreased dexterity, dysarthria and dysphagia. Non-motor symptoms:  fatigue, cognitive changes, sleep disturbances, neuropathy.      Presenting condition or subjective complaint: Parkinsons  Date of onset: 11/15/23    Relevant medical history:     Past Medical History:   Diagnosis Date    Abnormal dopamine scan (DaTSCAN) 2020 11/04/2020    [phone] 11/3/2020   Narrative & Impression   Examination: Nuclear medicine DATscan for Dopamine Receptor Localization.   Examination: NM Brain Image Tomographic (SPECT) DATscan   Date: 11/3/2020 3:21 PM   Indication: Parkinsonism   Comparison: None   Additional Information: none   Interfering Medications: None   Technique:   The patient initially received 1 ml of Lugol's solution orally prior    Antiphospholipid antibody syndrome (H24)     Ravi's, noted negative per testing re-done in 2008 in hematology note 01/13/2009    Articulation disorder 09/23/2020     Atrial fibrillation (H) 04/2011    Benign prostatic hyperplasia with urinary retention     Cirrhosis (H)     CKD (chronic kidney disease) stage 2, GFR 60-89 ml/min     Diabetes mellitus type II     steroid induced    DJD (degenerative joint disease) of knee     SHAWN, worse on right    DVT (deep venous thrombosis) (H)     ED (erectile dysfunction)     Gallbladder polyp 05/2010    Jjfcp-Rwsxzo-Drmz Disease 2007    BMT    Heart failure with preserved ejection fraction, NYHA class I (H)     Hemochromatosis     Hodgkin's disease NOS     5 cycles of ABVD in 2011    HTN (hypertension)     Hyperlipidaemia LDL goal <100     Multiple thyroid nodules     benign     Myeloproliferative disorder (H)     atypical, [facility]    Parkinson disease 09/24/2020    PE (pulmonary embolism) 11/2004    Polyneuropathy     Pressure ulcer     Primary pulmonary hypertension (H)     Severe protein-calorie malnutrition (H24) 11/10/2021    Small bowel obstruction (H) 10/29/2021    Thrombocytopenia (H24) 06/08/2021    Thyroid nodule 05/17/2016       Dates & types of surgery: See history     Prior diagnostic imaging/testing results:       Prior therapy history for the same diagnosis, illness or injury: Yes 2021    Prior Level of Function  Transfers: Independent  Ambulation: Independent  ADL: Independent  IADL: Driving, Housekeeping, Medication management    Living Environment  Social support: With a significant other or spouse   Type of home: House; Multi-level 5 level house  Stairs to enter the home: No       Ramp: No   Stairs inside the home: Yes 4 Is there a railing: Yes (  Help at home: None  Equipment owned: Straight Cane; Walker with wheels; Crutches; Grab bars; Commode; Raised toilet seat; Bath bench     Employment: No    Hobbies/Interests: TV, computer, carpentry, built an addition to his house in 1994, working on his 1937 car (unable to now work under the car and on his hands/knees).  Patient has 2 kids, dtr lives very close.    Patient goals  "for therapy:  Improve coordination (buttoning and handwriting)    Pain assessment: Pain denied     Objective       Cognitive Status Examination  Orientation: Oriented to person, place and time   Level of Consciousness: Alert  Follows Commands and Answers Questions: 100% of the time, Follows single step instructions, dysarthria  Personal Safety and Judgement: Intact  Memory:  Did not have time to fully assess today.      VISUAL SKILLS  Visual Acuity: Wears glasses  Visual Field: Appears normal  Visual Attention: Appears normal  Reports left eye has been poor since age 2 due to a lazy eye - amblyopia  Right eye post cataract surgery had an artificial lens put in and does not wear glasses till the evening.    SENSATION:  UE sensation intact, neuropathy in feet.    POSTURE: Standing Posture: Rounded shoulders, Forward head  Sitting Posture: Rounded shoulders, Forward head  RANGE OF MOTION: UE AROM WFL, able to get to full AROM with cues, decreased perception of movement noted  STRENGTH:  Generally 4/5 for B shoulders, 5/5 for elbow flexion/extension.  COORDINATION: Tremors: Resting, very mild, R > L  Left Hand, Nine Hole Peg Test (sec): 27.7 \" (BNLs for age group)  Right Hand, Nine Hole Peg Test (sec): 33.2\" (BNLs for age group)  Timed Handwriting (or written assessment): Patient demonstrating normal grasp on pen, small amplitude of letters when both printing and writing, fatigues with increased writing.    BALANCE:     The Timed Up & Go Test (TUG) is designed to assess mobility risk of falls in adult populations. Pt was observed ambulating 10 feet, turning, and returning to chair without use of an assistive device. Scores above 12 seconds indicates risk for falls.  Pt displayed short stride length, narrow base of support, decreased arm swing, able to initiate turning with postural instability noted - no gross loss of balance. No observed gait festination or freezing. Results revealed a score of 9.3 seconds.    The " "Timed Up & Go: COGNITIVE: Patient has difficulty with dual motor and cognitive task component with changes in gait when ambulating 10 ft and counting backwards by 3's. Score: 13.68\" seconds. Score >14.7 sec indicates increased risk of falls. The increased time to complete this test with the addition of a cognitive and a motor task would indicate difficulty with dual tasking is evident in this patient.     FUNCTIONAL MOBILITY  Assistive Device(s): None  Ambulation: Owns 2 canes and a couple of walkers but does not use.  Feels unsteady when walking.  Reports always avoiding obstacles at home with his 2 cats.          BED MOBILITY:  Had troubles getting out of bed, getting in to bed is fine.  Reports that it is harder to roll in bed (moreso because of his cats)    TRANSFERS: Independent    BATHING:  Independent but reports increased difficulty with stepping over 6\" shower step.  Equipment:  Walk in shower with 6\" step up, does not have grab bars but sits on shower chair, textured floor.    UPPER BODY DRESSING:  Reports challenges with buttons, wears snap buttons    LOWER BODY DRESSING: Reports difficulty with getting on his L sock, some challenges with balance when donning pants.  Wears slip on shoes.      TOILETING:  Reports difficulty with reaching back to wipe after a bowel movement  Will stand up and bend forward to wipe with a wash rag.    Equipment: Raised toilet seat with arms, reports challenges with space to wipe.    GROOMING:  Sits on toilet to brush teeth and stands to finish, does not floss.   Equipment: Electric razor, Electric toothbrush    EATING/SELF FEEDING:  Patient has a PEG-tube for most feeding, will occasionally eat orally (a tablespoon at a time, mashed potatoes, stuffing) . Patient with swallowing disorder, history of apiration pneomina.      ACTIVITY TOLERANCE: Goes to bed around midnight and up before 8 a.m., listens to the radio before bed.  Up 3-4 times a night to use the bathroom, reports " "feeling very achy/stiff in bed so feels it is good to get up and move around.  Sleeps during the day, takes a nap at noon and then during the evening news.      INSTRUMENTAL ACTIVITIES OF DAILY LIVING (IADL):   Meal Planning/Prep: Pt/wife does not do much cooking, tube feedings 3x/day (needs to sit for 1 hour each time...\"it affects my social life.\")  Home/Financial Management: Patient vaccums and sweeps, wife does all the dishes/laundry but patient carries the laundry up/down stairs for her. They have a cleaning lady that will deep clean when needed.  Communication/Computer Use: Uses the cell phone and computer, reports challenges with typing/texting/writing.  Community Mobility: Patient drives daily, limits night driving due to vision.    Assessment & Plan   CLINICAL IMPRESSIONS  Medical Diagnosis: Parkinson's Disease    Treatment Diagnosis: Impaired coordination and functional mobility    Impression/Assessment: Pt is a 73 year old male presenting to Occupational Therapy for the BIG and LOUD/amplitude program.  The following significant findings have been identified: Impaired activity tolerance, Impaired balance, Impaired coordination, Impaired ROM, Impaired sensation, Impaired strength, and Impaired sensory feedback.  These identified deficits interfere with their ability to perform self care tasks, recreational activities, household chores, household mobility, and community mobility as compared to previous level of function.     Clinical Decision Making (Complexity):  Assessment of Occupational Performance: 3-5 Performance Deficits  Occupational Performance Limitations: bathing/showering, toileting, dressing, functional mobility, hygiene and grooming, driving and community mobility, health management and maintenance, home establishment and management, sleep, leisure activities, and social participation  Clinical Decision Making (Complexity): Low complexity    PLAN OF CARE  Treatment " Interventions:  Interventions: Community/Work Reintegration, Self-Care/Home Management, Therapeutic Activity, Therapeutic Exercise, Neuromuscular Re-education    Long Term Goals   OT Goal 1  Goal Identifier: 1-Coordination  Goal Description: Patient will use high amplitude strategies to improve FMC for buttoning, writing as demonstrated by completing the 9HPT in less than 25 seconds with both R and L hand.  Target Date: 02/27/24  OT Goal 2  Goal Identifier: 2-UE HEP  Goal Description: Patient will demonstrate independence with high amplitude/PWR! HEP to maximize ROM/function, reduce rigidety, and improve ease/independence with mobility/ADLs.  Target Date: 02/27/24  OT Goal 3  Goal Identifier: 3-ADL routine  Goal Description: Patient will use high amplitude ROM/effort to complete UE/LE dressing independently to decrease amount of time to complete morning routine.  Target Date: 02/27/24      Frequency of Treatment: 2x/week x 4 weeks, decreasing to 1x/week x 4 weeks  Duration of Treatment: 8 weeks     Recommended Referrals to Other Professionals:  Patient seeing SLP and PT this week  Education Assessment: Learner/Method: Patient;Listening;Demonstration  Education Comments: role of OT, BIG vs. PWR!     Risks and benefits of evaluation/treatment have been explained.   Patient/Family/caregiver agrees with Plan of Care.     Evaluation Time:    OT Eval, Low Complexity Minutes (48207): 35      Signing Clinician: Farrah Ricardo OT      [facility]                                                                                   OUTPATIENT OCCUPATIONAL THERAPY      PLAN OF TREATMENT FOR OUTPATIENT REHABILITATION   Patient's Last Name, First Name, WINTERJOSE  Haresh Rock  EMILIE YOB: 1950   Provider's Name   [facility]   Medical Record No.  [id number]     Onset Date: 11/15/23 Start of Care Date: 12/05/23     Medical Diagnosis:  Parkinson's Disease      OT  Treatment Diagnosis:  Impaired coordination and functional mobility Plan of Treatment  Frequency/Duration:2x/week x 4 weeks, decreasing to 1x/week x 4 weeks/8 weeks    Certification date from 12/05/23   To 02/27/24 (cert extended to 12 weeks for scheduling purposes)        See note for plan of treatment details and functional goals     Farrah Ricardo OT R/L                        I CERTIFY THE NEED FOR THESE SERVICES FURNISHED UNDER        THIS PLAN OF TREATMENT AND WHILE UNDER MY CARE     (Physician attestation of this document indicates review and certification of the therapy plan).              Referring Provider:  Ángel Hill    Initial Assessment  See Epic Evaluation- 12/05/23

## 2023-12-07 ENCOUNTER — THERAPY VISIT (OUTPATIENT)
Dept: SPEECH THERAPY | Facility: CLINIC | Age: 73
End: 2023-12-07
Attending: PSYCHIATRY & NEUROLOGY
Payer: COMMERCIAL

## 2023-12-07 ENCOUNTER — PATIENT OUTREACH (OUTPATIENT)
Dept: CARE COORDINATION | Facility: CLINIC | Age: 73
End: 2023-12-07

## 2023-12-07 DIAGNOSIS — F80.0 ARTICULATION DISORDER: ICD-10-CM

## 2023-12-07 DIAGNOSIS — R49.9 VOICE DISORDER: ICD-10-CM

## 2023-12-07 DIAGNOSIS — G20.A1 PARKINSON'S DISEASE (H): ICD-10-CM

## 2023-12-07 DIAGNOSIS — R49.8 HYPOPHONIA: Primary | ICD-10-CM

## 2023-12-07 PROCEDURE — 92524 BEHAVRAL QUALIT ANALYS VOICE: CPT | Mod: GN | Performed by: STUDENT IN AN ORGANIZED HEALTH CARE EDUCATION/TRAINING PROGRAM

## 2023-12-07 NOTE — PROGRESS NOTES
SPEECH LANGUAGE PATHOLOGY EVALUATION    See electronic medical record for Abuse and Falls Screening details.    Subjective      Presenting condition or subjective complaint:      74yo male with PD presenting with worsening voice and speech symptoms.  Pt reports that his voice is softer and his speech can become slurred.  People sometimes have difficulty hearing him, and he particularly struggles with talking to automated systems over the phone that use voice recognition software.  He has completed LSVT LOUD and EMST exercises in the past, but is not regularly practicing them.  He has a history of dysphagia with aspiration PNA and receives all nutrition via G-tube.  There are a few pills he still takes by mouth, and occasionally they will get stuck in his throat.  He also complains of drainage in the throat.  Date of onset: 11/16/23 (order date)    Relevant medical history:  See below  Dates & types of surgery:      Prior diagnostic imaging/testing results:    See Neurology reports in Epic   Prior therapy history for the same diagnosis, illness or injury:    ; pt has completed amplitude-based voice therapy and swallowing therapy with SLP in the past.    PAST MEDICAL HISTORY:   Past Medical History:   Diagnosis Date    Abnormal dopamine scan (DaTSCAN) 2020 11/04/2020    [phone] 11/3/2020   Narrative & Impression   Examination: Nuclear medicine DATscan for Dopamine Receptor Localization.   Examination: NM Brain Image Tomographic (SPECT) DATscan   Date: 11/3/2020 3:21 PM   Indication: Parkinsonism   Comparison: None   Additional Information: none   Interfering Medications: None   Technique:   The patient initially received 1 ml of Lugol's solution orally prior    Antiphospholipid antibody syndrome (H24)     Ravi's, noted negative per testing re-done in 2008 in hematology note 01/13/2009    Articulation disorder 09/23/2020    Atrial fibrillation (H) 04/2011    Benign prostatic hyperplasia with urinary retention      Cirrhosis (H)     CKD (chronic kidney disease) stage 2, GFR 60-89 ml/min     Diabetes mellitus type II     steroid induced    DJD (degenerative joint disease) of knee     SHAWN, worse on right    DVT (deep venous thrombosis) (H)     ED (erectile dysfunction)     Gallbladder polyp 05/2010    Qudei-Qywdzm-Bznp Disease 2007    BMT    Heart failure with preserved ejection fraction, NYHA class I (H)     Hemochromatosis     Hodgkin's disease NOS     5 cycles of ABVD in 2011    HTN (hypertension)     Hyperlipidaemia LDL goal <100     Multiple thyroid nodules     benign     Myeloproliferative disorder (H)     atypical, [facility]    Parkinson disease 09/24/2020    PE (pulmonary embolism) 11/2004    Polyneuropathy     Pressure ulcer     Primary pulmonary hypertension (H)     Severe protein-calorie malnutrition (H24) 11/10/2021    Small bowel obstruction (H) 10/29/2021    Thrombocytopenia (H24) 06/08/2021    Thyroid nodule 05/17/2016       PAST SURGICAL HISTORY:   Past Surgical History:   Procedure Laterality Date    ANESTHESIA CARDIOVERSION  04/21/2011    Procedure:ANESTHESIA CARDIOVERSION; Surgeon:GENERIC ANESTHESIA PROVIDER; Location:[facility] OR    ARTHROSCOPY KNEE RT/LT      LT    CATARACT IOL, RT/LT  2017    COLONOSCOPY  10/16/2012    Procedure: COLONOSCOPY;  Colonoscopy, screening;  Surgeon: Reg Feliciano MD;  Location: [facility] OR    COLONOSCOPY N/A 04/14/2021    Procedure: COLONOSCOPY;  Surgeon: Reg Holm MD;  Location: [facility] GI    ESOPHAGOSCOPY, GASTROSCOPY, DUODENOSCOPY (EGD), COMBINED N/A 10/07/2020    Procedure: ESOPHAGOGASTRODUODENOSCOPY, WITH BIOPSY;  Surgeon: Raul Sawyer DO;  Location: [facility] OR    H ABLATION FOCAL AFIB  03/05/2013    PVI    H ABLATION FOCAL AFIB  01/01/2018    HC BONE MARROW/STEM TRANSPLANT ALLOGENIC  05/08/2007    INSERT PORT VASCULAR ACCESS      IR GASTROSTOMY TUBE PERCUTANEOUS PLCMNT  10/25/2021    JOINT REPLACEMTN, KNEE RT/LT  03/28/2012    SHAWN    LAPAROSCOPY DIAGNOSTIC (GENERAL) N/A  10/29/2021    Procedure: LAPAROSCOPY, DIAGNOSTIC, TAKEDOWN OF MALPOSITIONED GASTROSTOMY TUBE, INSERTION OF GASTROSTOMY TUBE, SMALL BOWEL RESECTION X1, PRIMARY REPAIR OF SMALL BOWEL ENTEROTOMY, ABDOMINAL WASHOUT, TAP BLOCK;  Surgeon: Leif Finney DO;  Location: [facility] OR    PEG TUBE PLACEMENT  10/25/2021    VASECTOMY  1990       Living Environment  Social support:     Help at home:    Equipment owned:       Employment:      Hobbies/Interests:      Patient goals for therapy:  To be heard and understood by others in conversation    Pain assessment: Pain denied     Objective     PERSONAL RATING/VOICE USE RATING  Patient description of current voice quality: Pt reports that today is a typical day for his voice and speech symptoms.    ACOUSTIC MEASURES:  Equipment use for testing: Sound level meter at 30 cm distance from microphone to mouth    Maximum sustained phonation:     Prolonged  ah  at mid pitch (sec): 63dB SPL for an average of 8.8 seconds with consistent moderate breathiness, Given cues patient improves to 73dB SPL for an average of 12.7 seconds with less breathiness but some pitch instability     Vibratory Function of Vocal Folds Scale Norms: males 20 - 80 yrs: 15 - 25 secs   Average Fundamental Frequency: 155.56 Hz (centered around Eb3) with monotone intonation  Range in Hz: B2-E4       Reading: paragraph level reading loudness in dB SPL: 60 dB  Discourse: Conversation/Monologue level:     Discourse loudness in dB SPL 63 dB     Stimulability - loudness in dB SPL: 69 dB (range: 59-79 dB)    PERCEPTUAL EVALUATION  Voice quality:  Consistent moderate-severe asthenia and consistent mild-moderate breathiness.    Breath control: weak  Voice Use/Effort: hypophonic, pt reports needing to expend vocal effort to be heard by others  Neuromuscular Control: hypokinetic with run together laryngeal DDKs  Speech Intelligibility: Speech is largely intelligible except when too soft.  Labial/lingual AMRs are mildly  imprecise but otherwise regular.  Labial/lingual SMRs are mildly imprecise and irregular.    Assessment & Plan   CLINICAL IMPRESSIONS   Medical Diagnosis: Parkinson's disease, Articulation disorder, Voice disorder    Treatment Diagnosis: Hypophonia   Impression/Assessment: Pt is a 73 year old male with voice and speech complaints secondary to Parkinson's disease. The following significant findings have been identified:  moderate-severe hypophonia , characterized by reduced volume, breathiness, asthenia, weak breath control, mildly imprecise labial/lingual AMRs/SMRs, monotone intonation, and increased phonatory effort for speech. Identified deficits interfere with their ability to communicate within the home or community as compared to previous level of function.    PLAN OF CARE  Treatment Interventions: Voice using amplitude-based voice therapy techniques    Prognosis to achieve stated therapy goals is fair   Rehab potential is impacted by: comorbidities, current level of function    Long Term Goals   SLP Goal 1  Goal Identifier: Breath  Goal Description: Patient will increase vocal loudness to an average SPL of >70 dB at 30 cm during sustained /a/ phonation of at least 10 seconds given min cues in order to increase vocal respiratory support required for functional communication.  Target Date: 03/06/24  SLP Goal 2  Goal Identifier: Loudness  Goal Description: Patient will increase vocal loudness during narrative reading and semi-spontaneous conversation tasks to a minimum range of 68-73 dB at 30 cm given min cues in order to improve vocal loudness for daily communication tasks.  Target Date: 03/06/24      Frequency of Treatment: 2x/week  Duration of Treatment: 4 weeks with 2 monthly follow-ups     Recommended Referrals to Other Professionals:  N/A  Education Assessment:   Learner/Method: Patient;Listening;No Barriers to Learning  Education Comments: SLP provided education regarding evaluation findings and proposed  POC.  Pt verbalized understanding.    Risks and benefits of evaluation/treatment have been explained.   Patient/Family/caregiver agrees with Plan of Care.     Evaluation Time:    Voice Minutes (10929): 30    Signing Clinician: MADELYN Painting B.A. (melissa), M.A., CCC-SLP  Speech-Language Pathologist  [place] Certificate of Vocology  [facility]  [phone]          [facility]                                                                                   OUTPATIENT SPEECH LANGUAGE PATHOLOGY      PLAN OF TREATMENT FOR OUTPATIENT REHABILITATION   Patient's Last Name, First Name, Haresh Valentin YOB: 1950   Provider's Name   [facility]   Medical Record No.  [id number]     Onset Date: 11/16/23 (order date) Start of Care Date: 12/07/23     Medical Diagnosis:  Parkinson's disease, Articulation disorder, Voice disorder      SLP Treatment Diagnosis: Hypophonia  Plan of Treatment  Frequency/Duration: 2x/week  / 4 weeks with 2 monthly follow-ups     Certification date from 12/07/23   To 03/06/24          See note for plan of treatment details and functional goals     Pily Clemente, SLP                         I CERTIFY THE NEED FOR THESE SERVICES FURNISHED UNDER        THIS PLAN OF TREATMENT AND WHILE UNDER MY CARE     (Physician attestation of this document indicates review and certification of the therapy plan).              Referring Provider:  Ángel Hill    Initial Assessment  See Epic Evaluation- 12/07/23

## 2023-12-07 NOTE — PROGRESS NOTES
Clinic Care Coordination Contact  Community Health Worker Follow Up    Care Gaps:     Health Maintenance Due   Topic Date Due    RSV VACCINE (Pregnancy & 60+) (1 - 1-dose 60+ series) Never done    ZOSTER IMMUNIZATION (1 of 2) 11/14/2016    IPV IMMUNIZATION (3 of 3 - Adult catch-up series) 03/19/2017    DTAP/TDAP/TD IMMUNIZATION (2 - Td or Tdap) 09/11/2022    A1C  08/25/2023    LIPID  10/11/2023    DIABETIC FOOT EXAM  10/11/2023    COVID-19 Vaccine (8 - 2023-24 season) 11/28/2023       CHW reminded patient he is due for follow up with PCP    Care Plan:   Care Plan: General  Work on increasing strength and stamina for a year after reaching 100%       Problem: HP GENERAL PROBLEM       Goal: General  work on walking without the walker, and increasing my strength and stamina.  For at least 1 year after reaching 100%.       Start Date: 12/13/2021 Expected End Date: 6/13/2024    This Visit's Progress: 30% Recent Progress: 20%    Note:     Barriers: suffers from parkinson's  Strengths: engaged in care coordination  Patient expressed understanding of goal: yes  Action steps to achieve this goal:  1. I will continue walking without the walker. No longer using the walker as of 3/15/22. Completed action step  2. I will go outside without the walker when I feel strong enough and have increased my strength and stamina.  Continue to work on for at least a year.  3. I will frequently walk around inside the house to increase my strength and stamina.  Continue to work on for at least a year.                            Care Plan: General - Working on balance and items in the path,       Problem: HP GENERAL PROBLEM       Goal: General Goal - work on balance and avoiding items in the path       Start Date: 10/28/2022 Expected End Date: 10/28/2024    This Visit's Progress: 20% Recent Progress: 10%    Note:     Barriers: parkinson's  Strengths: engaged in care coordination  Patient expressed understanding of goal: yes   Action steps to  achieve this goal:  1. I will work on walking around items in my path without losing my balance.  2. I will work on maintaining my balance when I walk through the house.  3. I will use my walker or cane as needed to maintain my balance.                                Intervention and Education during outreach: Patient has started some new therapies that were ordered by his neurologist. He just started speech therapy today and he is also doing PT and OT. He has had no recent falls and is not using a cane or walker at this time. Today he plans on getting outside in his yard to clean up some leaves.     CHW Plan: CHW will continue to support patient with goals through routine scheduled outreach.     Next outreach due: 1/8/24

## 2023-12-08 ENCOUNTER — ANTICOAGULATION THERAPY VISIT (OUTPATIENT)
Dept: ANTICOAGULATION | Facility: CLINIC | Age: 73
End: 2023-12-08

## 2023-12-08 ENCOUNTER — THERAPY VISIT (OUTPATIENT)
Dept: PHYSICAL THERAPY | Facility: CLINIC | Age: 73
End: 2023-12-08
Payer: COMMERCIAL

## 2023-12-08 ENCOUNTER — TELEPHONE (OUTPATIENT)
Dept: ANTICOAGULATION | Facility: CLINIC | Age: 73
End: 2023-12-08

## 2023-12-08 DIAGNOSIS — R26.89 IMBALANCE: ICD-10-CM

## 2023-12-08 DIAGNOSIS — G20.A1 PARKINSON'S DISEASE WITHOUT DYSKINESIA, UNSPECIFIED WHETHER MANIFESTATIONS FLUCTUATE (H): Primary | ICD-10-CM

## 2023-12-08 DIAGNOSIS — R26.89 IMPAIRED GAIT AND MOBILITY: ICD-10-CM

## 2023-12-08 LAB — INR (EXTERNAL): 2.4 (ref 0.9–1.1)

## 2023-12-08 PROCEDURE — 97162 PT EVAL MOD COMPLEX 30 MIN: CPT | Mod: GP | Performed by: PHYSICAL THERAPIST

## 2023-12-08 PROCEDURE — 97110 THERAPEUTIC EXERCISES: CPT | Mod: GP | Performed by: PHYSICAL THERAPIST

## 2023-12-08 NOTE — PROGRESS NOTES
ANTICOAGULATION MANAGEMENT     Haresh Rock 73 year old male is on warfarin with therapeutic INR result. (Goal INR 2.0-3.0)    Recent labs: (last 7 days)     12/08/23  0000   INR 2.4*       ASSESSMENT     Source(s): Chart Review and Patient/Caregiver Call     Warfarin doses taken: Warfarin taken as instructed  Diet: No new diet changes identified  Medication/supplement changes: None noted  New illness, injury, or hospitalization: No  Signs or symptoms of bleeding or clotting: No  Previous result: Therapeutic last visit; previously outside of goal range  Additional findings: None       PLAN     Recommended plan for no diet, medication or health factor changes affecting INR     Dosing Instructions: Continue your current warfarin dose with next INR in 1 week       Summary  As of 12/8/2023      Full warfarin instructions:  2.5 mg every Tue, Thu, Sat; 3.75 mg all other days   Next INR check:  12/15/2023               Telephone call with Haresh who agrees to plan and repeated back plan correctly    Patient to recheck with home meter    Education provided:   Please call back if any changes to your diet, medications or how you've been taking warfarin  Goal range and lab monitoring: goal range and significance of current result    Plan made per [facility] anticoagulation protocol    Gabrieal Sosa RN  Anticoagulation Clinic  12/8/2023    _______________________________________________________________________     Anticoagulation Episode Summary       Current INR goal:  2.0-3.0   TTR:  87.5% (1 y)   Target end date:  Indefinite   Send INR reminders to:  [facility]    Indications    Long-term (current) use of anticoagulants [Z79.01] [Z79.01]  Atrial fibrillation (H) (Resolved) [I48.91]  Antiphospholipid antibody syndrome (H24) (Resolved) [D68.61]  Personal history of DVT (deep vein thrombosis) (Resolved) [Z86.718]  Atrial fibrillation  unspecified type (H) (Resolved) [I48.91]  Paroxysmal atrial fibrillation (H)  [I48.0]  Chronic atrial fibrillation (H) (Resolved) [I48.20]  Personal history of PE (pulmonary embolism) [Z86.711]             Comments:  JESSICA villafuerteay to manage by exception             Anticoagulation Care Providers       Provider Role Specialty Phone number    Anya Nowak MD Referring Family Medicine [phone]    Elizabeth Balderas MD Referring HOSPITALIST [phone]

## 2023-12-08 NOTE — PROGRESS NOTES
Mini-BESTest: Balance Evaluation Systems Test    8835-2892 Formerly [facility]. All rights reserved.    ANTICIPATORY BALANCE  1. SIT TO STAND  Instruction:  Cross your arms across your chest. Try not to use your hands unless you must. Do not let your legs lean  against the back of the chair when you stand. Please stand up now.   (2) Normal: Comes to stand without use of hands and stabilizes independently.  (1) Moderate: Comes to stand WITH use of hands on first attempt.  (0) Severe: Unable to stand up from chair without assistance, OR needs several attempts with use of hands.    2. RISE TO TOES  Instruction:  Place your feet shoulder width apart. Place your hands on your hips. Try to rise as high as you can onto your  toes. I will count out loud to 3 seconds. Try to hold this pose for at least 3 seconds. Look straight ahead. Rise now.   (2) Normal: Stable for 3 s with maximum height.  (1) Moderate: Heels up, but not full range (smaller than when holding hands), OR noticeable instability for 3 s.  (0) Severe: < 3 s.    3. STAND ON ONE LEG  Instruction:  Look straight ahead. Keep your hands on your hips. Lift your leg off of the ground behind you without touching or  resting your raised leg upon your other standing leg. Stay standing on one leg as long as you can. Look straight ahead. Lift  now.   Left: Time in Seconds Trial 1:_____Trial 2:_____  (2) Normal: 20 s.  (1) Moderate: < 20 s.  (0) Severe: Unable.    Right: Time in Seconds Trial 1:_____Trial 2:_____  (2) Normal: 20 s.  (1) Moderate: < 20 s.  (0) Severe: Unable    To score each side separately use the trial with the longest time.  To calculate the sub-score and total score use the side [left or right] with the lowest numerical score [i.e. the worse side].    REACTIVE POSTURAL CONTROL  4. COMPENSATORY STEPPING CORRECTION- FORWARD  Instruction:  Stand with your feet shoulder width apart, arms at your sides. Lean forward against my hands beyond  your  forward limits. When I let go, do whatever is necessary, including taking a step, to avoid a fall.   (2) Normal: Recovers independently with a single, large step (second realignment step is allowed).  (1) Moderate: More than one step used to recover equilibrium.  (0) Severe: No step, OR would fall if not caught, OR falls spontaneously.    5. COMPENSATORY STEPPING CORRECTION- BACKWARD  Instruction:  Stand with your feet shoulder width apart, arms at your sides. Lean backward against my hands beyond your  backward limits. When I let go, do whatever is necessary, including taking a step, to avoid a fall.   (2) Normal: Recovers independently with a single, large step.  (1) Moderate: More than one step used to recover equilibrium.  (0) Severe: No step, OR would fall if not caught, OR falls spontaneously.    6. COMPENSATORY STEPPING CORRECTION- LATERAL  Instruction:  Stand with your feet together, arms down at your sides. Lean into my hand beyond your sideways limit. When I  let go, do whatever is necessary, including taking a step, to avoid a fall.   Left  (2) Normal: Recovers independently with 1 step  (crossover or lateral OK).  (1) Moderate: Several steps to recover equilibrium.  (0) Severe: Falls, or cannot step.    Right  (2) Normal: Recovers independently with 1 step  (crossover or lateral OK).  (1) Moderate: Several steps to recover equilibrium.  (0) Severe: Falls, or cannot step.    To calculate the sub-score and total score use the side [left or right] with the lowest numerical score [i.e. the worse side].    SENSORY ORIENTATION  7. STANCE (FEET TOGETHER); EYES OPEN, FIRM SURFACE  Instruction:  Place your hands on your hips. Place your feet together until almost touching. Look straight ahead. Be as stable  and still as possible, until I say stop.   Time in seconds:________  (2) Normal: 30 s.  (1) Moderate: < 30 s.  (0) Severe: Unable.    8. STANCE (FEET TOGETHER); EYES CLOSED, FOAM SURFACE  Instruction:   Step onto the foam. Place your hands on your hips. Place your feet together until almost touching. Be as stable  and still as possible, until I say stop. I will start timing when you close your eyes.   Time in seconds:__6______  (2) Normal: 30 s.  (1) Moderate: < 30 s.  (0) Severe: Unable.    9. INCLINE- EYES CLOSED  Instruction:  Step onto the incline ramp. Please stand on the incline ramp with your toes toward the top. Place your feet  shoulder width apart and have your arms down at your sides. I will start timing when you close your eyes.   Time in seconds:__10______  (2) Normal: Stands independently 30 s and aligns with gravity.  (1) Moderate: Stands independently <30 s OR aligns with surface.  (0) Severe: Unable.    DYNAMIC GAIT  10. CHANGE IN GAIT SPEED  Instruction:  Begin walking at your normal speed, when I tell you  fast , walk as fast as you can. When I say  slow , walk very  slowly.   (2) Normal: Significantly changes walking speed without imbalance.  (1) Moderate: Unable to change walking speed or signs of imbalance.  (0) Severe: Unable to achieve significant change in walking speed AND signs of imbalance.    11. WALK WITH HEAD TURNS - HORIZONTAL  Instruction:  Begin walking at your normal speed, when I say  right , turn your head and look to the right. When I say  left   turn your head and look to the left. Try to keep yourself walking in a straight line.   (2) Normal: performs head turns with no change in gait speed and good balance.  (1) Moderate: performs head turns with reduction in gait speed.  (0) Severe: performs head turns with imbalance.    12. WALK WITH PIVOT TURNS  Instruction:  Begin walking at your normal speed. When I tell you to  turn and stop , turn as quickly as you can, face the  opposite direction, and stop. After the turn, your feet should be close together.   (2) Normal: Turns with feet close FAST (< 3 steps) with good balance.  (1) Moderate: Turns with feet close SLOW (>4 steps)  with good balance.  (0) Severe: Cannot turn with feet close at any speed without imbalance.    13. STEP OVER OBSTACLES  Instruction:  Begin walking at your normal speed. When you get to the box, step over it, not around it and keep walking.   (2) Normal: Able to step over box with minimal change of gait speed and with good balance.  (1) Moderate: Steps over box but touches box OR displays cautious behavior by slowing gait.  (0) Severe: Unable to step over box OR steps around box.    14. TIMED UP & GO WITH DUAL TASK [3 METER WALK]  Instruction TUG:  When I say  Go , stand up from chair, walk at your normal speed across the tape on the floor, turn around,  and come back to sit in the chair.   Instruction TUG with Dual Task:  Count backwards by threes starting at ___. When I say  Go , stand up from chair, walk at  your normal speed across the tape on the floor, turn around, and come back to sit in the chair. Continue counting backwards  the entire time.   TUG: ____11.2____seconds; Dual Task TUG: _____14.4___seconds  (2) Normal: No noticeable change in sitting, standing or walking while backward counting when compared to TUG without  Dual Task.  (1) Moderate: Dual Task affects either counting OR walking (>10%) when compared to the TUG without Dual Task.  (0) Severe: Stops counting while walking OR stops walking while counting.    When scoring item 14, if subject s gait speed slows more than 10% between the TUG without and with a Dual Task the score  should be decreased by a point.    REACTIVE POSTURAL CONTROL SUB SCORE: / 6  ANTICIPATORY SUB SCORE: / 6  SENSORY ORIENTATION SUB SCORE: / 6  DYNAMIC GAIT SUB SCORE: /10    TOTAL SCORE: ____18____/28      Mini-BESTest: The Mini-BESTest assesses reactive postural, anticipatory, sensory orientation, and dynamic gait balance.  Gait assistive device used: No AD     Patient Score: 18/28  Scores of <17.5/28 have identified people with chronic stroke that have a history of falling  according to Abdoul et al 2013.   Scores of <20/32 are predictive of increased falls risk for persons with Parkinsons Disease according to Vince et al 2012.    Minimally Detectable Change (MDC) for persons with Parkinsons Disease 5.52pts per Chris et al 2011.  Minimally Clinically Significant Difference (MCID) for persons with Balance Disorders: 4pts according to Aries pastor al 2013.

## 2023-12-08 NOTE — PROGRESS NOTES
PHYSICAL THERAPY EVALUATION  Type of Visit: Evaluation    See electronic medical record for Abuse and Falls Screening details.    Subjective       Presenting condition or subjective complaint: Parkinsons  Per OT note: Haresh was diagnosed with PD in fall of 2020. He reports mild resting tremors, which is interfering with his ability to write, button, use the computer. Patient takes Sinemet 3x/day (by mouth for the first two doses and crush the last one of the day through the feeding tube) and needs to sit for 1 hour for each tube feeding.  He does not exercise and reports changes with coordination, walking, posture, slowness and difficulty with getting on a shirt and donning LE clothes, difficulty with wiping after a bowel movement and changes with functional mobility/bed mobility. Motor symptoms: mild tremor, bradykinesia, hyokinesia rigidity, micrographia, decreased dexterity, dysarthria and dysphagia. Non-motor symptoms:  fatigue, cognitive changes, sleep disturbances, neuropathy.  Medication schedule: 8am, 2pm, 8pm   Reports today that his main mobility limitations include getting in/out of car and climbing stairs. He has not had any falls but a few close calls.     Date of onset: 11/15/23    Relevant medical history:     Past Medical History:   Diagnosis Date    Abnormal dopamine scan (DaTSCAN) 2020 11/04/2020    [phone] 11/3/2020   Narrative & Impression   Examination: Nuclear medicine DATscan for Dopamine Receptor Localization.   Examination: NM Brain Image Tomographic (SPECT) DATscan   Date: 11/3/2020 3:21 PM   Indication: Parkinsonism   Comparison: None   Additional Information: none   Interfering Medications: None   Technique:   The patient initially received 1 ml of Lugol's solution orally prior    Antiphospholipid antibody syndrome (H24)     Ravi's, noted negative per testing re-done in 2008 in hematology note 01/13/2009    Articulation disorder 09/23/2020    Atrial fibrillation (H) 04/2011    Benign  prostatic hyperplasia with urinary retention     Cirrhosis (H)     CKD (chronic kidney disease) stage 2, GFR 60-89 ml/min     Diabetes mellitus type II     steroid induced    DJD (degenerative joint disease) of knee     SHAWN, worse on right    DVT (deep venous thrombosis) (H)     ED (erectile dysfunction)     Gallbladder polyp 05/2010    Komhi-Gwkhzz-Nuzp Disease 2007    BMT    Heart failure with preserved ejection fraction, NYHA class I (H)     Hemochromatosis     Hodgkin's disease NOS     5 cycles of ABVD in 2011    HTN (hypertension)     Hyperlipidaemia LDL goal <100     Multiple thyroid nodules     benign     Myeloproliferative disorder (H)     atypical, [facility]    Parkinson disease 09/24/2020    PE (pulmonary embolism) 11/2004    Polyneuropathy     Pressure ulcer     Primary pulmonary hypertension (H)     Severe protein-calorie malnutrition (H24) 11/10/2021    Small bowel obstruction (H) 10/29/2021    Thrombocytopenia (H24) 06/08/2021    Thyroid nodule 05/17/2016     Dates & types of surgery: See history  Past Surgical History:   Procedure Laterality Date    ANESTHESIA CARDIOVERSION  04/21/2011    Procedure:ANESTHESIA CARDIOVERSION; Surgeon:GENERIC ANESTHESIA PROVIDER; Location:[facility] OR    ARTHROSCOPY KNEE RT/LT      LT    CATARACT IOL, RT/LT  2017    COLONOSCOPY  10/16/2012    Procedure: COLONOSCOPY;  Colonoscopy, screening;  Surgeon: Reg Feliciano MD;  Location: [facility] OR    COLONOSCOPY N/A 04/14/2021    Procedure: COLONOSCOPY;  Surgeon: Reg Holm MD;  Location: [facility] GI    ESOPHAGOSCOPY, GASTROSCOPY, DUODENOSCOPY (EGD), COMBINED N/A 10/07/2020    Procedure: ESOPHAGOGASTRODUODENOSCOPY, WITH BIOPSY;  Surgeon: Raul Sawyer DO;  Location: [facility] OR    H ABLATION FOCAL AFIB  03/05/2013    PVI    H ABLATION FOCAL AFIB  01/01/2018    HC BONE MARROW/STEM TRANSPLANT ALLOGENIC  05/08/2007    INSERT PORT VASCULAR ACCESS      IR GASTROSTOMY TUBE PERCUTANEOUS PLCMNT  10/25/2021    JOINT REPLACEMTN, KNEE RT/LT   03/28/2012    SHAWN    LAPAROSCOPY DIAGNOSTIC (GENERAL) N/A 10/29/2021    Procedure: LAPAROSCOPY, DIAGNOSTIC, TAKEDOWN OF MALPOSITIONED GASTROSTOMY TUBE, INSERTION OF GASTROSTOMY TUBE, SMALL BOWEL RESECTION X1, PRIMARY REPAIR OF SMALL BOWEL ENTEROTOMY, ABDOMINAL WASHOUT, TAP BLOCK;  Surgeon: Leif Finney DO;  Location: [facility] OR    PEG TUBE PLACEMENT  10/25/2021    VASECTOMY  1990     Prior diagnostic imaging/testing results:     see pmh  Prior therapy history for the same diagnosis, illness or injury: Yes 2021    Prior Level of Function  Transfers: Independent  Ambulation: Independent, no AD  ADL: Independent    Living Environment  Social support: With a significant other or spouse   Type of home: House; Multi-level   Stairs to enter the home: No       Ramp: No   Stairs inside the home: Yes 4 Is there a railing: Yes   Help at home: None  Equipment owned: Straight Cane; Walker with wheels; Crutches; Grab bars; Commode; Raised toilet seat; Bath bench     Employment: No    Hobbies/Interests: TV, computer, yard work    Patient goals for therapy:  improve strength, balance, and functional mobility     Pain assessment: Pain denied     Objective   Cognitive Status Examination  Orientation: Oriented to person, place and time   Level of Consciousness: Alert  Follows Commands and Answers Questions:  talks at low volume due to dysarthria  Personal Safety and Judgement: Intact    OBSERVATION  Bradykinesia and Hypokinesia (Combining slowness, hesitency, decreased arm swing, small amplitude and poverty of movement in general): Mild  Dyskinesia:No  Posture: rounded shoulders     STRENGTH: not formally assessed but demonstrates functional LE weakness as seen by 5xSTS.     BED MOBILITY:   Not formally assessed due to time but would benefit from assessment at future session. Reports he has trouble with rolling in bed (his feet get tangled in the sheets and his cat gets in the way)    TRANSFERS: WFL    GAIT:   Gait  Description:  short step length, slight forward posture, minimal arm swing      SPECIAL TESTS    360 degree turn (steps) 9 CW; 9 CCW      With no AD   Timed Up and Go (TUG) - sec 11.21   With no AD   Cognitive TUG - sec 14.38        Task: counting backwards by 3's starting at 100     5 Times Sit-to-Stand (5TSTS)  20.62 sec         Mini BESTest  18   (<22/28 indicates fall risk)  See full MiniBEST for details       10 Meter Walk Test (Comfortable)  0.94 m/s  # of steps:   Age/Gender Norm: M70 - 78 y/o: 1.38 +/- .23 meters/second   10 Meter Walk Test (Fast)  Not tested today  # of steps:   Age/Gender Norm: M70 - 78 y/o: 1.83 +/- .44 meters/second           SENSATION: B neuropathy  COORDINATION/MOTOR PLANNING:   Four Square Step Test--not tested today    Assessment & Plan   CLINICAL IMPRESSIONS  Medical Diagnosis: Parkinson's    Treatment Diagnosis: impaired balance and gait   Impression/Assessment: Patient is a 73 year old male with balance, impaired gait and mobility complaints.  The following significant findings have been identified: Decreased strength, Impaired balance, Impaired sensation, Impaired gait, Decreased activity tolerance, and Impaired posture. These impairments interfere with their ability to perform self care tasks, recreational activities, household chores, household mobility, and community mobility as compared to previous level of function.     Clinical Decision Making (Complexity):  Clinical Presentation: Evolving/Changing  Clinical Presentation Rationale: based on medical and personal factors listed in PT evaluation  Clinical Decision Making (Complexity): Moderate complexity    PLAN OF CARE  Treatment Interventions:  Interventions: Gait Training, Manual Therapy, Neuromuscular Re-education, Therapeutic Activity, Therapeutic Exercise    Long Term Goals     PT Goal 1  Goal Identifier: 5xSTS  Goal Description: PT will perform 5xSTS in 16 sec or less due to improvements in functional strength and  mobility  Goal Progress: eval: 20.62  Target Date: 03/07/24  PT Goal 2  Goal Identifier: gait speed  Goal Description: Pt will improve gait speed to >1.10 m/s  Rationale: to maximize safety and independence within the community  Goal Progress: eval: 0.94 m/s  Target Date: 03/06/24  PT Goal 3  Goal Identifier: miniBEST  Goal Description: Pt will score 22 or greater on miniBEST due to improvements in static and dynamic balance  Rationale: to maximize safety and independence with performance of ADLs and functional tasks  Goal Progress: eval: 18/22  Target Date: 03/06/24  PT Goal 4  Goal Identifier: HEP  Goal Description: Pt will demonstrate independence with HEP in order to improve self management of symptoms  Goal Progress: in progress  Target Date: 03/06/24      Frequency of Treatment: 2x/week  Duration of Treatment: 90 days    Recommended Referrals to Other Professionals: currently working with speech and OT  Education Assessment:   Learner/Method: Listening;Demonstration;Pictures/Video  Education Comments: HEP, POC    Risks and benefits of evaluation/treatment have been explained.   Patient/Family/caregiver agrees with Plan of Care.     Evaluation Time:     PT Eval, Moderate Complexity Minutes (20304): 35     Signing Clinician: Shivani Nieto, PT      [facility]                                                                                   OUTPATIENT PHYSICAL THERAPY      PLAN OF TREATMENT FOR OUTPATIENT REHABILITATION   Patient's Last Name, First Name, Haresh Valentin YOB: 1950   Provider's Name   [facility]   Medical Record No.  [id number]     Onset Date: 11/15/23  Start of Care Date: 12/08/23     Medical Diagnosis:  Parkinson's      PT Treatment Diagnosis:  impaired balance and gait Plan of Treatment  Frequency/Duration: 2x/week/ 90 days    Certification date from 12/08/23 to 03/06/24         See note for plan of  treatment details and functional goals     Shivani Nieto, PT                         I CERTIFY THE NEED FOR THESE SERVICES FURNISHED UNDER        THIS PLAN OF TREATMENT AND WHILE UNDER MY CARE     (Physician attestation of this document indicates review and certification of the therapy plan).              Referring Provider:  Ángel Hill    Initial Assessment  See Epic Evaluation- Start of Care Date: 12/08/23

## 2023-12-08 NOTE — TELEPHONE ENCOUNTER
12/8/23  Haresh transferred to [facility] main line.  LVM with INR report on home meter today 12/8/23 is 2.4.  Please call patient at [phone].    Matti Livingston RN

## 2023-12-12 ENCOUNTER — PATIENT OUTREACH (OUTPATIENT)
Dept: CARE COORDINATION | Facility: CLINIC | Age: 73
End: 2023-12-12
Payer: COMMERCIAL

## 2023-12-12 NOTE — PROGRESS NOTES
Clinic Care Coordination Contact  Care Coordination Clinician Chart Review    Situation: Patient chart reviewed by Care Coordinator.       Background: Care Coordination Program started: 11/5/2021. Initial assessment completed and patient-centered care plan(s) were developed with participation from patient. Lead CC handed patient off to CHW for continued outreaches.       Assessment: Per chart review, patient outreach completed by CC CHW on 12/7/23.  Patient is actively working to accomplish goal(s). Patient's goal(s) appropriate and relevant at this time. Patient is due for updated Plan of Care.  Assessments will be completed annually or as needed/with change of patient status.      Care Plan: General  Work on increasing strength and stamina for a year after reaching 100%       Problem: HP GENERAL PROBLEM       Goal: General  work on walking without the walker, and increasing my strength and stamina.  For at least 1 year after reaching 100%.       Start Date: 12/13/2021 Expected End Date: 6/13/2024    This Visit's Progress: 30% Recent Progress: 20%    Note:     Barriers: suffers from parkinson's  Strengths: engaged in care coordination  Patient expressed understanding of goal: yes  Action steps to achieve this goal:  1. I will continue walking without the walker. No longer using the walker as of 3/15/22. Completed action step  2. I will go outside without the walker when I feel strong enough and have increased my strength and stamina.  Continue to work on for at least a year.  3. I will frequently walk around inside the house to increase my strength and stamina.  Continue to work on for at least a year.                            Care Plan: General - Working on balance and items in the path,       Problem: HP GENERAL PROBLEM       Goal: General Goal - work on balance and avoiding items in the path       Start Date: 10/28/2022 Expected End Date: 10/28/2024    This Visit's Progress: 20% Recent Progress: 10%    Note:      Barriers: parkinson's  Strengths: engaged in care coordination  Patient expressed understanding of goal: yes   Action steps to achieve this goal:  1. I will work on walking around items in my path without losing my balance.  2. I will work on maintaining my balance when I walk through the house.  3. I will use my walker or cane as needed to maintain my balance.                                   Plan/Recommendations: The patient will continue working with Care Coordination to achieve goal(s) as above. CHW will continue outreaches at minimum every 30 days and will involve Lead CC as needed or if patient is ready to move to Maintenance. Lead CC will continue to monitor CHW outreaches and patient's progress to goal(s) every 6 weeks.     Plan of Care updated and sent to patient: Yes, via GILUPI.              Uriel Zhou MSN, RN, PHN, CCM   Primary Care Clinical RN Care Coordinator  [facility]  12/12/2023   2:57 PM  [email or web address]  Office: [phone]

## 2023-12-12 NOTE — LETTER
[facility]  Patient Centered Plan of Care  About Me:        Patient Name:  Haresh Granados    YOB: 1950  Age:         73 year old   Radha MRN:    [id number] Telephone Information:  Home Phone [phone]   Mobile [phone]       Address:  [address] [place] [address] Email address:  [email or web address]      Emergency Contact(s)    Name Relationship Lgl Grd Work Phone Home Phone Mobile Phone   1. JANICE GRANADOS Spouse No   [phone]   2. LAY GRANADOS Daughter No   [phone]   3. SOL GRANADOS Son No   [phone]           Primary language:  English     needed? No   [employer]:  [phone] op. 1  Other communication barriers:Glasses; Language barrier    Preferred Method of Communication:  Mail  Current living arrangement: I live in a private home with family    Mobility Status/ Medical Equipment: Independent        Health Maintenance  Health Maintenance Reviewed: Due/Overdue   Health Maintenance Due   Topic Date Due    RSV VACCINE (Pregnancy & 60+) (1 - 1-dose 60+ series) Never done    ZOSTER IMMUNIZATION (1 of 2) 11/14/2016    IPV IMMUNIZATION (3 of 3 - Adult catch-up series) 03/19/2017    DTAP/TDAP/TD IMMUNIZATION (2 - Td or Tdap) 09/11/2022    A1C  08/25/2023    LIPID  10/11/2023    DIABETIC FOOT EXAM  10/11/2023    COVID-19 Vaccine (8 - 2023-24 season) 11/28/2023    CBC  01/09/2024           My Access Plan  Medical Emergency 911   Primary Clinic Line [facility] - [phone]   24 Hour Appointment Line [phone] or  [phone] ([phone]) (toll-free)   24 Hour Nurse Line [phone] (toll-free)   Preferred Urgent Care No data recorded   Preferred Hospital [facility]  [phone]     Preferred Pharmacy [id number] [place] - [address] [facility]     Behavioral Health Crisis Line The National Suicide Prevention Lifeline at 1-309.184.9398 or  Text/Call 988           My Care Team Members  Patient Care Team         Relationship Specialty Notifications Start End    Anya Nowak MD PCP - General Family Practice  4/23/19     Phone: [phone] Fax: [phone] [address] [facility] [facility] [address]    Augusto Miller MD MD Hematology Admissions 9/30/11     Referred to Endocrinology    Phone: [phone] Fax: [phone]         [address] [facility] [address]    Jesusita Mejia PA-C Physician Assistant   3/21/12     Phone: [phone] Fax: [phone]         [address] [facility] [address] [facility] [address]    Pino Sharma MD MD Internal Medicine  4/11/16     Phone: [phone] Fax: [phone]         [address] [facility] [address] [facility] [address]    Fabi Jimenez MD MD INTERNAL MEDICINE - ENDOCRINOLOGY, DIABETES & METABOLISM  4/18/16     Phone: [phone] Fax: [phone]         [address] [facility] [address] [facility] [address]    Bekah Matt MD MD Cardiology  8/12/16     Phone: [phone] Fax: [phone]         [address] [facility] [address] [facility] [address]    Tina Mejia, RN Specialty Care Coordinator Cardiology  3/6/19     Phone: [phone] Fax: [phone]         [address] [place] [address]    Bekah Matt MD Assigned Heart and Vascular Provider   10/23/20     Phone: [phone] Fax: [phone]         [address] [facility] [address] [place] [address]    Ángel Hill MD Assigned Neuroscience Provider   11/15/20     Phone: [phone] Fax: [phone]         [address] [place] [address]    Rosalva Samuels MD MD Family Medicine  6/1/21     Phone: [phone] Fax: [phone] [address] [facility] [address]    Bekah Matt MD MD Clinical Cardiac Electrophysiology  9/10/21     Phone: [phone] Fax: [phone]         [address] [place] [address]    Uriel Van, RN Lead Care Coordinator Primary Care - CC Admissions  11/5/21     Phone: [phone] Fax: [phone]        Lizbeth Chen [facility] Pharmacist Pharmacist Ambulatory Care  11/12/21     Phone: [phone] [address] [facility] [address]    Leslie Clifford [facility] Pharmacist Pharmacist  12/13/21     Phone: [phone] Pager: [phone] Fax: [phone]        [address] [facility] [address]    Bonnie Walls RN Specialty Care Coordinator Hematology & Oncology Admissions 12/16/21     Phone: [phone] Pager: [phone]        Maye Rain CHW Community Health Worker Primary Care - CC Admissions 4/18/22     Phone: [phone] Fax: [phone]        Reg Cantu MD MD Critical Care  6/9/22     Phone: [phone] Fax: [phone]         [address] [place] [address]    Reg Cantu MD Assigned Surgical Provider   2/18/23     Phone: [phone] Fax: [phone]         [address] [place] [address]    Augusto Miller MD Assigned Cancer Care Provider   7/1/23     Phone: [phone] Fax: [phone]         [address] [facility] [address]    Nayana Hummel PA-C Assigned PCP   7/8/23     Phone: [phone] Fax: [phone] [address] [facility] [address]    Lizbeth Chen, [facility] Assigned MTM Pharmacist   7/22/23     Phone: [phone] [address] [facility] [address]                My Care Plans  Self Management and Treatment Plan    Care Plan  Care Plan: General  Work on increasing strength and stamina for a year after reaching 100%       Problem: HP GENERAL PROBLEM       Goal: General  work on walking without the walker, and increasing my strength and stamina.  For at least 1 year after reaching 100%.       Start Date: 12/13/2021 Expected End Date: 6/13/2024    This Visit's Progress: 30% Recent Progress: 20%    Note:     Barriers: suffers from parkinson's  Strengths: engaged in care coordination  Patient expressed understanding of goal:  yes  Action steps to achieve this goal:  1. I will continue walking without the walker. No longer using the walker as of 3/15/22. Completed action step  2. I will go outside without the walker when I feel strong enough and have increased my strength and stamina.  Continue to work on for at least a year.  3. I will frequently walk around inside the house to increase my strength and stamina.  Continue to work on for at least a year.                            Care Plan: General - Working on balance and items in the path,       Problem: HP GENERAL PROBLEM       Goal: General Goal - work on balance and avoiding items in the path       Start Date: 10/28/2022 Expected End Date: 10/28/2024    This Visit's Progress: 20% Recent Progress: 10%    Note:     Barriers: parkinson's  Strengths: engaged in care coordination  Patient expressed understanding of goal: yes   Action steps to achieve this goal:  1. I will work on walking around items in my path without losing my balance.  2. I will work on maintaining my balance when I walk through the house.  3. I will use my walker or cane as needed to maintain my balance.                                Action Plans on File:                       Advance Care Plans/Directives:   Advanced Care Plan/Directives on file:   Yes    Status of Document(s): No data recorded  Advanced Care Plan/Directives Type:   Advanced Directive - On File           My Medical and Care Information  Problem List   Patient Active Problem List   Diagnosis    Afkhi-ipztvt-qglo disease, chronic (H)    Polyneuropathy    History of bilateral knee replacement    Bone marrow transplant status (H)    Hyperlipidemia LDL goal <70    Chronic rhinitis    Gallstones without obstruction of gallbladder    Long-term (current) use of anticoagulants [Z79.01]    Type 2 diabetes mellitus with stage 2 chronic kidney disease, without long-term current use of insulin (H)    History of Hodgkin's lymphoma    History of irradiation,  presenting hazards to health    Hereditary hemochromatosis (H24)    Oropharyngeal dysphagia    Personal history of PE (pulmonary embolism)    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    Care Coordination Start Date: 11/5/2021   Frequency of Care Coordination: monthly, more frequently as needed     Form Last Updated: 12/12/2023

## 2023-12-14 ENCOUNTER — MEDICAL CORRESPONDENCE (OUTPATIENT)
Dept: HEALTH INFORMATION MANAGEMENT | Facility: CLINIC | Age: 73
End: 2023-12-14
Payer: COMMERCIAL

## 2023-12-15 ENCOUNTER — ANTICOAGULATION THERAPY VISIT (OUTPATIENT)
Dept: ANTICOAGULATION | Facility: CLINIC | Age: 73
End: 2023-12-15
Payer: COMMERCIAL

## 2023-12-15 DIAGNOSIS — Z86.711 PERSONAL HISTORY OF PE (PULMONARY EMBOLISM): ICD-10-CM

## 2023-12-15 DIAGNOSIS — I48.0 PAROXYSMAL ATRIAL FIBRILLATION (H): ICD-10-CM

## 2023-12-15 DIAGNOSIS — Z79.01 LONG TERM CURRENT USE OF ANTICOAGULANT THERAPY: Primary | ICD-10-CM

## 2023-12-15 LAB — INR (EXTERNAL): 2.7 (ref 0.9–1.1)

## 2023-12-15 NOTE — PROGRESS NOTES
"ANTICOAGULATION MANAGEMENT     Haresh Rock 73 year old male is on warfarin with therapeutic INR result. (Goal INR 2.0-3.0)    Recent labs: (last 7 days)     12/15/23  1143   INR 2.7*       ASSESSMENT     Source(s): Chart Review and Patient/Caregiver Call     Warfarin doses taken: Warfarin taken as instructed  Diet: No new diet changes identified  Medication/supplement changes: None noted  New illness, injury, or hospitalization: No  Signs or symptoms of bleeding or clotting: No  Previous result: Therapeutic last 2(+) visits  Additional findings:  Patient called the  group main line.  Writer updated calendar, and discussed home monitor with patient.  Haresh has been having issues with his reporting of his INR.  He enters his 6 digit pin ([id number]) and gets a hard stop saying he \"isn't logged in\".  Writer will follow up with home monitor with representative  Requested Haresh call the home monitoring [facility] at [phone], going forward.       PLAN     Recommended plan for no diet, medication or health factor changes affecting INR     Dosing Instructions: Continue your current warfarin dose with next INR in 1 week       Summary  As of 12/15/2023      Full warfarin instructions:  2.5 mg every Tue, Thu, Sat; 3.75 mg all other days   Next INR check:  12/22/2023               Telephone call with Haresh who verbalizes understanding and agrees to plan and who agrees to plan and repeated back plan correctly    Patient to recheck with home meter    Education provided:   Please call back if any changes to your diet, medications or how you've been taking warfarin  Contact [phone]  with any changes, questions or concerns.     Plan made per [facility] anticoagulation protocol    Matti Livingston, RN  Anticoagulation Clinic  12/15/2023    _______________________________________________________________________     Anticoagulation Episode Summary       Current INR goal:  2.0-3.0   TTR:  88.5% (1 y)   Target end date:  Indefinite   Send INR " reminders to:  ANTICOAG HOME MONITORING    Indications    Long-term (current) use of anticoagulants [Z79.01] [Z79.01]  Atrial fibrillation (H) (Resolved) [I48.91]  Antiphospholipid antibody syndrome (H24) (Resolved) [D68.61]  Personal history of DVT (deep vein thrombosis) (Resolved) [Z86.718]  Atrial fibrillation  unspecified type (H) (Resolved) [I48.91]  Paroxysmal atrial fibrillation (H) [I48.0]  Chronic atrial fibrillation (H) (Resolved) [I48.20]  Personal history of PE (pulmonary embolism) [Z86.711]             Comments:  JESSICA rodas to manage by exception             Anticoagulation Care Providers       Provider Role Specialty Phone number    Anya Nowak MD Referring Family Medicine [phone]    Elizabeth Balderas MD Referring HOSPITALIST [phone]

## 2023-12-22 ENCOUNTER — TELEPHONE (OUTPATIENT)
Dept: FAMILY MEDICINE | Facility: CLINIC | Age: 73
End: 2023-12-22
Payer: COMMERCIAL

## 2023-12-22 ENCOUNTER — ANTICOAGULATION THERAPY VISIT (OUTPATIENT)
Dept: ANTICOAGULATION | Facility: CLINIC | Age: 73
End: 2023-12-22
Payer: COMMERCIAL

## 2023-12-22 DIAGNOSIS — I48.0 PAROXYSMAL ATRIAL FIBRILLATION (H): ICD-10-CM

## 2023-12-22 DIAGNOSIS — Z79.01 LONG TERM CURRENT USE OF ANTICOAGULANT THERAPY: Primary | ICD-10-CM

## 2023-12-22 DIAGNOSIS — Z86.711 PERSONAL HISTORY OF PE (PULMONARY EMBOLISM): ICD-10-CM

## 2023-12-22 LAB — INR (EXTERNAL): 2.6 (ref 0.9–1.1)

## 2023-12-22 NOTE — TELEPHONE ENCOUNTER
Reason for call:  Other   Patient called regarding (reason for call):   ANTICOAG READING     Additional comments:   ANTICOAG READING of INR 2.6    Phone number to reach patient:  Cell number on file:    Telephone Information:   Mobile [phone]       Best Time:  any    Can we leave a detailed message on this number?  YES    Travel screening: Not Applicable

## 2023-12-22 NOTE — PROGRESS NOTES
ANTICOAGULATION MANAGEMENT     Haresh Rock 73 year old male is on warfarin with therapeutic INR result. (Goal INR 2.0-3.0)    Recent labs: (last 7 days)     12/22/23  1424   INR 2.6*       ASSESSMENT     Source(s): Chart Review and Patient/Caregiver Call     Warfarin doses taken: Warfarin taken as instructed  Diet: No new diet changes identified  Medication/supplement changes: None noted  New illness, injury, or hospitalization: No  Signs or symptoms of bleeding or clotting: No  Previous result: Therapeutic last 2(+) visits  Additional findings:  still unable to report to [facility]        PLAN     Recommended plan for no diet, medication or health factor changes affecting INR     Dosing Instructions: Continue your current warfarin dose with next INR in 1 week       Summary  As of 12/22/2023      Full warfarin instructions:  2.5 mg every Tue, Thu, Sat; 3.75 mg all other days   Next INR check:  12/29/2023               Telephone call with Haresh who verbalizes understanding and agrees to plan and who agrees to plan and repeated back plan correctly  - patient will continue to call in results until Account with [facility] is working again    Patient to recheck with home meter    Education provided:   Contact [phone]  with any changes, questions or concerns.     Plan made per [facility] anticoagulation protocol    Macrina Preston RN  Anticoagulation Clinic  12/22/2023    _______________________________________________________________________     Anticoagulation Episode Summary       Current INR goal:  2.0-3.0   TTR:  88.5% (1 y)   Target end date:  Indefinite   Send INR reminders to:  ANTICOAG HOME MONITORING    Indications    Long-term (current) use of anticoagulants [Z79.01] [Z79.01]  Atrial fibrillation (H) (Resolved) [I48.91]  Antiphospholipid antibody syndrome (H24) (Resolved) [D68.61]  Personal history of DVT (deep vein thrombosis) (Resolved) [Z86.718]  Atrial fibrillation  unspecified type (H) (Resolved) [I48.91]  Paroxysmal  atrial fibrillation (H) [I48.0]  Chronic atrial fibrillation (H) (Resolved) [I48.20]  Personal history of PE (pulmonary embolism) [Z86.711]             Comments:  JESSICA rodas to manage by exception             Anticoagulation Care Providers       Provider Role Specialty Phone number    Anya Nowak MD Referring Family Medicine [phone]    Elizabeth Balderas MD Referring HOSPITALIST [phone]

## 2023-12-29 ENCOUNTER — TELEPHONE (OUTPATIENT)
Dept: ANTICOAGULATION | Facility: CLINIC | Age: 73
End: 2023-12-29
Payer: COMMERCIAL

## 2023-12-29 DIAGNOSIS — Z79.01 LONG TERM CURRENT USE OF ANTICOAGULANT THERAPY: Primary | ICD-10-CM

## 2023-12-29 DIAGNOSIS — I48.0 PAROXYSMAL ATRIAL FIBRILLATION (H): ICD-10-CM

## 2023-12-29 DIAGNOSIS — Z86.711 PERSONAL HISTORY OF PE (PULMONARY EMBOLISM): ICD-10-CM

## 2023-12-29 LAB — INR (EXTERNAL): 3.1 (ref 0.8–1.1)

## 2023-12-29 NOTE — TELEPHONE ENCOUNTER
1200pm    12/29/23  Please call Haresh back.  He has results today on his home monitor.    Thank you.    Matti Livingston RN

## 2023-12-29 NOTE — TELEPHONE ENCOUNTER
ANTICOAGULATION MANAGEMENT     Haresh Rock 73 year old male is on warfarin with therapeutic INR result. (Goal INR [unfilled])    Recent labs: (last 7 days)     12/29/23  1213   INR 3.1*       ASSESSMENT     Source(s): Chart Review and Patient/Caregiver Call     Warfarin doses taken: Warfarin taken as instructed  Diet: No new diet changes identified  Medication/supplement changes: None noted  New illness, injury, or hospitalization: No  Signs or symptoms of bleeding or clotting: No  Previous result: Therapeutic last 2(+) visits  Additional findings: None       PLAN     Recommended plan for no diet, medication or health factor changes affecting INR     Dosing Instructions: Continue your current warfarin dose with next INR in 1 week       Summary  As of 12/29/2023      Full warfarin instructions:  2.5 mg every Tue, Thu, Sat; 3.75 mg all other days   Next INR check:                 Telephone call with Haresh who verbalizes understanding and agrees to plan    Patient to recheck with home meter    Education provided:   Goal range and lab monitoring: goal range and significance of current result    Plan made per [facility] anticoagulation protocol    Laverne Ferguson RN  Anticoagulation Clinic  12/29/2023    _______________________________________________________________________     Anticoagulation Episode Summary       Current INR goal:  2.0-3.0   TTR:  88.1% (1 y)   Target end date:  Indefinite   Send INR reminders to:  ANTICOAG HOME MONITORING    Indications    Long-term (current) use of anticoagulants [Z79.01] [Z79.01]  Atrial fibrillation (H) (Resolved) [I48.91]  Antiphospholipid antibody syndrome (H24) (Resolved) [D68.61]  Personal history of DVT (deep vein thrombosis) (Resolved) [Z86.718]  Atrial fibrillation  unspecified type (H) (Resolved) [I48.91]  Paroxysmal atrial fibrillation (H) [I48.0]  Chronic atrial fibrillation (H) (Resolved) [I48.20]  Personal history of PE (pulmonary embolism) [Z86.711]             Comments:   JESSICA rodas to manage by exception             Anticoagulation Care Providers       Provider Role Specialty Phone number    Anya Nowak MD Referring Family Medicine [phone]    Elizabeth Balderas MD Referring HOSPITALIST [phone]

## 2024-01-03 ENCOUNTER — THERAPY VISIT (OUTPATIENT)
Dept: PHYSICAL THERAPY | Facility: CLINIC | Age: 74
End: 2024-01-03
Payer: COMMERCIAL

## 2024-01-03 DIAGNOSIS — R26.89 IMBALANCE: ICD-10-CM

## 2024-01-03 DIAGNOSIS — I48.0 PAROXYSMAL ATRIAL FIBRILLATION (H): ICD-10-CM

## 2024-01-03 DIAGNOSIS — D68.61 ANTIPHOSPHOLIPID ANTIBODY SYNDROME (H): ICD-10-CM

## 2024-01-03 DIAGNOSIS — I47.10 PAROXYSMAL SUPRAVENTRICULAR TACHYCARDIA (H): ICD-10-CM

## 2024-01-03 DIAGNOSIS — G20.A1 PARKINSON'S DISEASE WITHOUT DYSKINESIA, UNSPECIFIED WHETHER MANIFESTATIONS FLUCTUATE (H): Primary | ICD-10-CM

## 2024-01-03 PROCEDURE — 97530 THERAPEUTIC ACTIVITIES: CPT | Mod: GP | Performed by: PHYSICAL THERAPIST

## 2024-01-03 PROCEDURE — 97110 THERAPEUTIC EXERCISES: CPT | Mod: GP | Performed by: PHYSICAL THERAPIST

## 2024-01-04 RX ORDER — WARFARIN SODIUM 2.5 MG/1
TABLET ORAL
Qty: 110 TABLET | Refills: 1 | Status: SHIPPED | OUTPATIENT
Start: 2024-01-04

## 2024-01-04 NOTE — TELEPHONE ENCOUNTER
ANTICOAGULATION MANAGEMENT:  Medication Refill    Anticoagulation Summary  As of 12/29/2023      Warfarin maintenance plan:  2.5 mg (2.5 mg x 1) every Tue, Thu, Sat; 3.75 mg (2.5 mg x 1.5) all other days   Next INR check:  1/5/2024   Target end date:  Indefinite    Indications    Long-term (current) use of anticoagulants [Z79.01] [Z79.01]  Atrial fibrillation (H) (Resolved) [I48.91]  Antiphospholipid antibody syndrome (H24) (Resolved) [D68.61]  Personal history of DVT (deep vein thrombosis) (Resolved) [Z86.718]  Atrial fibrillation  unspecified type (H) (Resolved) [I48.91]  Paroxysmal atrial fibrillation (H) [I48.0]  Chronic atrial fibrillation (H) (Resolved) [I48.20]  Personal history of PE (pulmonary embolism) [Z86.711]                 Anticoagulation Care Providers       Provider Role Specialty Phone number    Anya Nowak MD Referring Family Medicine [phone]    Elizabeth Balderas MD Referring HOSPITALIST [phone]            Refill Criteria    Visit with referring provider/group: Meets criteria: office visit within referring provider group in the last 1 year on 11/29/23    [facility] referral signed last signed: 07/03/2023; within last year: Yes    Lab monitoring not exceeding 2 weeks overdue: Yes    Haresh meets all criteria for refill. Rx instructions and quantity supplied updated to match patient's current dosing plan. Warfarin 90 day supply with 1 refill granted per [facility] protocol     Milvia Martinez RN  Anticoagulation Clinic

## 2024-01-05 ENCOUNTER — ANTICOAGULATION THERAPY VISIT (OUTPATIENT)
Dept: ANTICOAGULATION | Facility: CLINIC | Age: 74
End: 2024-01-05
Payer: COMMERCIAL

## 2024-01-05 ENCOUNTER — TELEPHONE (OUTPATIENT)
Dept: ANTICOAGULATION | Facility: CLINIC | Age: 74
End: 2024-01-05
Payer: COMMERCIAL

## 2024-01-05 DIAGNOSIS — Z86.711 PERSONAL HISTORY OF PE (PULMONARY EMBOLISM): ICD-10-CM

## 2024-01-05 DIAGNOSIS — Z79.01 LONG TERM CURRENT USE OF ANTICOAGULANT THERAPY: Primary | ICD-10-CM

## 2024-01-05 DIAGNOSIS — I48.0 PAROXYSMAL ATRIAL FIBRILLATION (H): ICD-10-CM

## 2024-01-05 LAB — INR (EXTERNAL): 1.5 (ref 0.9–1.1)

## 2024-01-05 NOTE — TELEPHONE ENCOUNTER
Called the patient and given [facility] number to call is [phone]    Please see 1/5/2024 Anticoagulation Therapy encounter for further information    Shanice Dhillon [facility]ticoagulation [facility]    [phone]

## 2024-01-05 NOTE — TELEPHONE ENCOUNTER
1/5/24    Haresh left voicemail on [facility] main line.  He has results, but his message was cut short.  Please call patient, and give him correct contact information.    Matti

## 2024-01-05 NOTE — PROGRESS NOTES
ANTICOAGULATION MANAGEMENT     Haresh Rock 73 year old male is on warfarin with subtherapeutic INR result. (Goal INR 2.0-3.0)    Recent labs: (last 7 days)     01/05/24  0000   INR 1.5*       ASSESSMENT     Source(s): Chart Review and Patient/Caregiver Call     Warfarin doses taken: Warfarin taken as instructed  Diet: No new diet changes identified  Medication/supplement changes: None noted  New illness, injury, or hospitalization: No  Signs or symptoms of bleeding or clotting: No  Previous result: Supratherapeutic  Additional findings:  Still having problems reporting INR result to [facility]- reported that he had to test twice today - stated that he used the same finger.       PLAN     Recommended plan for no diet, medication or health factor changes affecting INR     Dosing Instructions: booster dose then continue your current warfarin dose with next INR in 1 week       Summary  As of 1/5/2024      Full warfarin instructions:  1/5: 5 mg; Otherwise 2.5 mg every Tue, Thu, Sat; 3.75 mg all other days   Next INR check:  1/12/2024               Telephone call with Haresh who verbalizes understanding and agrees to plan    Patient to recheck with home meter    Education provided:   Contact [phone]  with any changes, questions or concerns.     Plan made per [facility] anticoagulation protocol    Shanice Dhillon RN  Anticoagulation Clinic  1/5/2024    _______________________________________________________________________     Anticoagulation Episode Summary       Current INR goal:  2.0-3.0   TTR:  87.5% (1 y)   Target end date:  Indefinite   Send INR reminders to:  ANTICOAG HOME MONITORING    Indications    Long-term (current) use of anticoagulants [Z79.01] [Z79.01]  Atrial fibrillation (H) (Resolved) [I48.91]  Antiphospholipid antibody syndrome (H24) (Resolved) [D68.61]  Personal history of DVT (deep vein thrombosis) (Resolved) [Z86.718]  Atrial fibrillation  unspecified type (H) (Resolved) [I48.91]  Paroxysmal atrial  fibrillation (H) [I48.0]  Chronic atrial fibrillation (H) (Resolved) [I48.20]  Personal history of PE (pulmonary embolism) [Z86.711]             Comments:  JESSICA rodas to manage by exception             Anticoagulation Care Providers       Provider Role Specialty Phone number    Anya Nowak MD Referring Family Medicine [phone]    Elizabeth Balderas MD Referring HOSPITALIST [phone]

## 2024-01-08 ENCOUNTER — THERAPY VISIT (OUTPATIENT)
Dept: SPEECH THERAPY | Facility: CLINIC | Age: 74
End: 2024-01-08
Payer: COMMERCIAL

## 2024-01-08 ENCOUNTER — THERAPY VISIT (OUTPATIENT)
Dept: PHYSICAL THERAPY | Facility: CLINIC | Age: 74
End: 2024-01-08
Payer: COMMERCIAL

## 2024-01-08 DIAGNOSIS — F80.0 ARTICULATION DISORDER: ICD-10-CM

## 2024-01-08 DIAGNOSIS — R49.9 VOICE DISORDER: ICD-10-CM

## 2024-01-08 DIAGNOSIS — G20.A1 PARKINSON'S DISEASE WITHOUT DYSKINESIA, UNSPECIFIED WHETHER MANIFESTATIONS FLUCTUATE (H): Primary | ICD-10-CM

## 2024-01-08 DIAGNOSIS — R49.8 HYPOPHONIA: Primary | ICD-10-CM

## 2024-01-08 DIAGNOSIS — R26.89 IMPAIRED GAIT AND MOBILITY: ICD-10-CM

## 2024-01-08 DIAGNOSIS — G20.A1 PARKINSON'S DISEASE WITHOUT DYSKINESIA OR FLUCTUATING MANIFESTATIONS (H): ICD-10-CM

## 2024-01-08 PROCEDURE — 97110 THERAPEUTIC EXERCISES: CPT | Mod: GP

## 2024-01-08 PROCEDURE — 92507 TX SP LANG VOICE COMM INDIV: CPT | Mod: GN

## 2024-01-08 PROCEDURE — 97116 GAIT TRAINING THERAPY: CPT | Mod: GP

## 2024-01-08 NOTE — TELEPHONE ENCOUNTER
FLECAINIDE ACETATE TABS 50MG       Last Written Prescription Date:  3-29-23  Last Fill Quantity: 180,   # refills: 2  Last Office Visit : 10-30-23  Future Office visit:  none    Routing refill request to provider for review/approval because:  Overdue lab: lipid panel  Med not on cards protocol

## 2024-01-09 ENCOUNTER — THERAPY VISIT (OUTPATIENT)
Dept: OCCUPATIONAL THERAPY | Facility: CLINIC | Age: 74
End: 2024-01-09
Payer: COMMERCIAL

## 2024-01-09 DIAGNOSIS — I48.0 PAROXYSMAL ATRIAL FIBRILLATION (H): ICD-10-CM

## 2024-01-09 DIAGNOSIS — R27.8 IMPAIRED COORDINATION OF UPPER EXTREMITY: ICD-10-CM

## 2024-01-09 DIAGNOSIS — G20.A1 PARKINSON'S DISEASE WITHOUT DYSKINESIA, UNSPECIFIED WHETHER MANIFESTATIONS FLUCTUATE (H): Primary | ICD-10-CM

## 2024-01-09 PROCEDURE — 97535 SELF CARE MNGMENT TRAINING: CPT | Mod: GO | Performed by: OCCUPATIONAL THERAPIST

## 2024-01-09 RX ORDER — METOPROLOL TARTRATE 25 MG/1
TABLET, FILM COATED ORAL
Qty: 90 TABLET | Refills: 1 | Status: SHIPPED | OUTPATIENT
Start: 2024-01-09 | End: 2024-07-08

## 2024-01-10 ENCOUNTER — PATIENT OUTREACH (OUTPATIENT)
Dept: SURGERY | Facility: CLINIC | Age: 74
End: 2024-01-10
Payer: COMMERCIAL

## 2024-01-10 RX ORDER — FLECAINIDE ACETATE 50 MG/1
TABLET ORAL
Qty: 180 TABLET | Refills: 2 | Status: SHIPPED | OUTPATIENT
Start: 2024-01-10 | End: 2024-09-06

## 2024-01-10 NOTE — PROGRESS NOTES
Patient needing fallon-key tube exchange in February or March per Dr. Cantu. Scheduled with Dr. Philippe on 2/13/24 at 1100 since Dr. Cantu is on medical leave. Patient ok with seeing a partner. All questions answered.

## 2024-01-10 NOTE — TELEPHONE ENCOUNTER
Signed Prescriptions:                        Disp   Refills    flecainide (TAMBOCOR) 50 MG tablet         180 ta*2        Sig: TAKE 1 TABLET TWICE A DAY  Authorizing Provider: JOEL MATA  Ordering User: PETRONA SIHNA

## 2024-01-11 ENCOUNTER — THERAPY VISIT (OUTPATIENT)
Dept: OCCUPATIONAL THERAPY | Facility: CLINIC | Age: 74
End: 2024-01-11
Payer: COMMERCIAL

## 2024-01-11 DIAGNOSIS — G20.A1 PARKINSON'S DISEASE WITHOUT DYSKINESIA, UNSPECIFIED WHETHER MANIFESTATIONS FLUCTUATE (H): Primary | ICD-10-CM

## 2024-01-11 DIAGNOSIS — R27.8 IMPAIRED COORDINATION OF UPPER EXTREMITY: ICD-10-CM

## 2024-01-11 PROCEDURE — 97112 NEUROMUSCULAR REEDUCATION: CPT | Mod: GO | Performed by: OCCUPATIONAL THERAPIST

## 2024-01-11 PROCEDURE — 97110 THERAPEUTIC EXERCISES: CPT | Mod: GO | Performed by: OCCUPATIONAL THERAPIST

## 2024-01-12 ENCOUNTER — THERAPY VISIT (OUTPATIENT)
Dept: PHYSICAL THERAPY | Facility: CLINIC | Age: 74
End: 2024-01-12
Payer: COMMERCIAL

## 2024-01-12 ENCOUNTER — PATIENT OUTREACH (OUTPATIENT)
Dept: CARE COORDINATION | Facility: CLINIC | Age: 74
End: 2024-01-12

## 2024-01-12 DIAGNOSIS — R26.89 IMBALANCE: ICD-10-CM

## 2024-01-12 DIAGNOSIS — R26.89 IMPAIRED GAIT AND MOBILITY: ICD-10-CM

## 2024-01-12 DIAGNOSIS — G20.A1 PARKINSON'S DISEASE WITHOUT DYSKINESIA, UNSPECIFIED WHETHER MANIFESTATIONS FLUCTUATE (H): Primary | ICD-10-CM

## 2024-01-12 PROCEDURE — 97112 NEUROMUSCULAR REEDUCATION: CPT | Mod: GP | Performed by: PHYSICAL THERAPIST

## 2024-01-12 PROCEDURE — 97530 THERAPEUTIC ACTIVITIES: CPT | Mod: GP | Performed by: PHYSICAL THERAPIST

## 2024-01-12 NOTE — PROGRESS NOTES
[facility]/Voicemail    Clinical Data: Care Coordinator Outreach    Outreach Documentation Number of Outreach Attempt   10/25/2023  12:25 PM 2   11/28/2023   2:18 PM 1   1/12/2024   3:50 PM 1       Left message on patient's voicemail with call back information and requested return call.    Plan:   Care Coordinator will try to reach patient again in 3-5 business days.    Next outreach due: 1/18/24

## 2024-01-15 ENCOUNTER — THERAPY VISIT (OUTPATIENT)
Dept: SPEECH THERAPY | Facility: CLINIC | Age: 74
End: 2024-01-15
Payer: COMMERCIAL

## 2024-01-15 ENCOUNTER — THERAPY VISIT (OUTPATIENT)
Dept: PHYSICAL THERAPY | Facility: CLINIC | Age: 74
End: 2024-01-15
Payer: COMMERCIAL

## 2024-01-15 DIAGNOSIS — F80.0 ARTICULATION DISORDER: ICD-10-CM

## 2024-01-15 DIAGNOSIS — G20.A1 PARKINSON'S DISEASE WITHOUT DYSKINESIA, UNSPECIFIED WHETHER MANIFESTATIONS FLUCTUATE (H): Primary | ICD-10-CM

## 2024-01-15 DIAGNOSIS — G20.A1 PARKINSON'S DISEASE WITHOUT DYSKINESIA OR FLUCTUATING MANIFESTATIONS (H): ICD-10-CM

## 2024-01-15 DIAGNOSIS — R49.8 HYPOPHONIA: Primary | ICD-10-CM

## 2024-01-15 DIAGNOSIS — R26.89 IMPAIRED GAIT AND MOBILITY: ICD-10-CM

## 2024-01-15 DIAGNOSIS — R49.9 VOICE DISORDER: ICD-10-CM

## 2024-01-15 PROCEDURE — 97112 NEUROMUSCULAR REEDUCATION: CPT | Mod: GP

## 2024-01-15 PROCEDURE — 97530 THERAPEUTIC ACTIVITIES: CPT | Mod: GP

## 2024-01-15 PROCEDURE — 92507 TX SP LANG VOICE COMM INDIV: CPT | Mod: GN

## 2024-01-15 PROCEDURE — 97110 THERAPEUTIC EXERCISES: CPT | Mod: GP

## 2024-01-16 ENCOUNTER — THERAPY VISIT (OUTPATIENT)
Dept: OCCUPATIONAL THERAPY | Facility: CLINIC | Age: 74
End: 2024-01-16
Payer: COMMERCIAL

## 2024-01-16 DIAGNOSIS — G20.A1 PARKINSON'S DISEASE WITHOUT DYSKINESIA, UNSPECIFIED WHETHER MANIFESTATIONS FLUCTUATE (H): Primary | ICD-10-CM

## 2024-01-16 DIAGNOSIS — R27.8 IMPAIRED COORDINATION OF UPPER EXTREMITY: ICD-10-CM

## 2024-01-16 PROCEDURE — 97110 THERAPEUTIC EXERCISES: CPT | Mod: 59 | Performed by: OCCUPATIONAL THERAPIST

## 2024-01-16 PROCEDURE — 97530 THERAPEUTIC ACTIVITIES: CPT | Mod: GO | Performed by: OCCUPATIONAL THERAPIST

## 2024-01-17 ENCOUNTER — LAB (OUTPATIENT)
Dept: LAB | Facility: CLINIC | Age: 74
End: 2024-01-17
Payer: COMMERCIAL

## 2024-01-17 ENCOUNTER — ANTICOAGULATION THERAPY VISIT (OUTPATIENT)
Dept: ANTICOAGULATION | Facility: CLINIC | Age: 74
End: 2024-01-17

## 2024-01-17 ENCOUNTER — DOCUMENTATION ONLY (OUTPATIENT)
Dept: ANTICOAGULATION | Facility: CLINIC | Age: 74
End: 2024-01-17

## 2024-01-17 DIAGNOSIS — I48.0 PAROXYSMAL ATRIAL FIBRILLATION (H): ICD-10-CM

## 2024-01-17 DIAGNOSIS — D68.61 ANTIPHOSPHOLIPID ANTIBODY SYNDROME (H): ICD-10-CM

## 2024-01-17 DIAGNOSIS — Z79.01 LONG TERM CURRENT USE OF ANTICOAGULANT THERAPY: Primary | ICD-10-CM

## 2024-01-17 DIAGNOSIS — E11.22 TYPE 2 DIABETES MELLITUS WITH STAGE 2 CHRONIC KIDNEY DISEASE, WITHOUT LONG-TERM CURRENT USE OF INSULIN (H): ICD-10-CM

## 2024-01-17 DIAGNOSIS — Z86.711 PERSONAL HISTORY OF PE (PULMONARY EMBOLISM): ICD-10-CM

## 2024-01-17 DIAGNOSIS — N18.2 TYPE 2 DIABETES MELLITUS WITH STAGE 2 CHRONIC KIDNEY DISEASE, WITHOUT LONG-TERM CURRENT USE OF INSULIN (H): ICD-10-CM

## 2024-01-17 LAB
HBA1C MFR BLD: 6.6 % (ref 0–5.6)
INR BLD: 3.3 (ref 0.9–1.1)

## 2024-01-17 PROCEDURE — 83036 HEMOGLOBIN GLYCOSYLATED A1C: CPT

## 2024-01-17 PROCEDURE — 85610 PROTHROMBIN TIME: CPT

## 2024-01-17 PROCEDURE — 80061 LIPID PANEL: CPT

## 2024-01-17 NOTE — CONFIDENTIAL NOTE
Anticoagulation Management    Updated home monitor orders needed due to Renewal requested by home monitor company    Current home monitor company: [employer]    [facility] referring provider: Anya Nowak MD    Referring provider's clinic fax number (except [employer]): [phone]    Request made by: Patient-per patient he was told by home monitor company they need an updated order with insurance information. He has been locked out of his account and cannot get testing supplies ordered.    Mayi Ayala RN

## 2024-01-17 NOTE — PROGRESS NOTES
ANTICOAGULATION MANAGEMENT     Haresh Rock 73 year old male is on warfarin with supratherapeutic INR result. (Goal INR 2.0-3.0)    Recent labs: (last 7 days)     01/17/24  1428   INR 3.3*       ASSESSMENT     Warfarin Lab Questionnaire    Warfarin Doses Last 7 Days      1/16/2024     9:26 PM   Dose in Tablet or Mg   TAB or MG? milligram (mg)     Pt Rptd Dose SUNDAY MONDAY TUESDAY WED THURS FRIDAY SATURDAY 1/16/2024   9:26 PM 3.75 3.75 2.5 3.75 2.5 3.75 2.5         1/16/2024   Warfarin Lab Questionnaire   Missed doses within past 14 days? No   Changes in diet or alcohol within past 14 days? No   Medication changes since last result? No   Injuries or illness since last result? No   New shortness of breath, severe headaches or sudden changes in vision since last result? No   Abnormal bleeding since last result? Yes   If yes, please explain: After dentist cleaning   Upcoming surgery, procedure? No   Best number to call with results? [phone]     Previous result: Subtherapeutic  Additional findings:  patient went to the dentist last Wednesday and had a one time dose of Amoxicillin.   Per patient his mouth was bleeding for about 6 hours after the appointment and he developed chills and a fever of 100.8 but it all resolved around 11p that night and patient has been doing well since.       PLAN     Recommended plan for temporary change(s) affecting INR     Dosing Instructions: partial hold then continue your current warfarin dose with next INR in 2 weeks       Summary  As of 1/17/2024      Full warfarin instructions:  1/17: 2.5 mg; Otherwise 2.5 mg every Tue, Thu, Sat; 3.75 mg all other days   Next INR check:                 Telephone call with Haresh who verbalizes understanding and agrees to plan  Sent [facility] message with dosing and follow up instructions    Lab visit scheduled-patient already has lab appointment for other labs to be checked.    Education provided:   Please call back if any changes to your diet,  medications or how you've been taking warfarin    Plan made per [facility] anticoagulation protocol    Mayi Ayala, RN  Anticoagulation Clinic  1/17/2024    _______________________________________________________________________     Anticoagulation Episode Summary       Current INR goal:  2.0-3.0   TTR:  86.9% (1 y)   Target end date:  Indefinite   Send INR reminders to:  ANTICOAG HOME [facility]IT[facility]    Indications    Long-term (current) use of anticoagulants [Z79.01] [Z79.01]  Atrial fibrillation (H) (Resolved) [I48.91]  Antiphospholipid antibody syndrome (H24) (Resolved) [D68.61]  Personal history of DVT (deep vein thrombosis) (Resolved) [Z86.718]  Atrial fibrillation  unspecified type (H) (Resolved) [I48.91]  Paroxysmal atrial fibrillation (H) [I48.0]  Chronic atrial fibrillation (H) (Resolved) [I48.20]  Personal history of PE (pulmonary embolism) [Z86.711]             Comments:  [employer] villafuerteay to manage by exception             Anticoagulation Care Providers       Provider Role Specialty Phone number    Anya Nowak MD Referring Family Medicine [phone]    Elizabeth Balderas MD Referring HOSPITALIST [phone]

## 2024-01-19 LAB
CHOLEST SERPL-MCNC: 95 MG/DL
FASTING STATUS PATIENT QL REPORTED: NO
HDLC SERPL-MCNC: 33 MG/DL
LDLC SERPL CALC-MCNC: 34 MG/DL
NONHDLC SERPL-MCNC: 62 MG/DL
TRIGL SERPL-MCNC: 141 MG/DL

## 2024-01-19 NOTE — RESULT ENCOUNTER NOTE
Haresh,    Your cholesterol is well controlled.    Your blood glucose is in the diabetes range. You have diet controlled diabetes. We can follow this test every 6 - 12 months as before.     Anya Nowak MD

## 2024-01-22 ENCOUNTER — THERAPY VISIT (OUTPATIENT)
Dept: SPEECH THERAPY | Facility: CLINIC | Age: 74
End: 2024-01-22
Payer: COMMERCIAL

## 2024-01-22 DIAGNOSIS — G20.A1 PARKINSON'S DISEASE WITHOUT DYSKINESIA OR FLUCTUATING MANIFESTATIONS (H): ICD-10-CM

## 2024-01-22 DIAGNOSIS — R49.8 HYPOPHONIA: Primary | ICD-10-CM

## 2024-01-22 DIAGNOSIS — R49.9 VOICE DISORDER: ICD-10-CM

## 2024-01-22 DIAGNOSIS — F80.0 ARTICULATION DISORDER: ICD-10-CM

## 2024-01-22 PROCEDURE — 92507 TX SP LANG VOICE COMM INDIV: CPT | Mod: GN

## 2024-01-23 ENCOUNTER — THERAPY VISIT (OUTPATIENT)
Dept: OCCUPATIONAL THERAPY | Facility: CLINIC | Age: 74
End: 2024-01-23
Payer: COMMERCIAL

## 2024-01-23 ENCOUNTER — PATIENT OUTREACH (OUTPATIENT)
Dept: CARE COORDINATION | Facility: CLINIC | Age: 74
End: 2024-01-23

## 2024-01-23 DIAGNOSIS — R27.8 IMPAIRED COORDINATION OF UPPER EXTREMITY: ICD-10-CM

## 2024-01-23 DIAGNOSIS — G20.A1 PARKINSON'S DISEASE WITHOUT DYSKINESIA, UNSPECIFIED WHETHER MANIFESTATIONS FLUCTUATE (H): Primary | ICD-10-CM

## 2024-01-23 PROCEDURE — 97530 THERAPEUTIC ACTIVITIES: CPT | Mod: GO | Performed by: OCCUPATIONAL THERAPIST

## 2024-01-23 NOTE — PROGRESS NOTES
[facility]/Voicemail    Clinical Data: Care Coordinator Outreach    Outreach Documentation Number of Outreach Attempt   10/25/2023  12:25 PM 2   11/28/2023   2:18 PM 1   1/12/2024   3:50 PM 1   1/23/2024   4:00 PM 2       Left message on patient's voicemail with call back information and requested return call.    Plan:   Care Coordinator will try to reach patient again in 10 business days.    Next outreach due: 2/6/24

## 2024-01-25 ENCOUNTER — PATIENT OUTREACH (OUTPATIENT)
Dept: ONCOLOGY | Facility: CLINIC | Age: 74
End: 2024-01-25
Payer: COMMERCIAL

## 2024-01-25 DIAGNOSIS — Z85.71 HISTORY OF HODGKIN'S LYMPHOMA: ICD-10-CM

## 2024-01-25 DIAGNOSIS — E83.110 HEREDITARY HEMOCHROMATOSIS (H): Primary | ICD-10-CM

## 2024-01-25 NOTE — PROGRESS NOTES
[facility]: Cancer Care                                                                                          Per conversation with Dr. Geovani Miller, RN called pt and asked if he would be willing to change his Wed, 2/7, virtual visit from 4:00 to 12:30.  Pt stated he would be willing and able to make that time change as long as appt will still be virtual.  RN sent message to clinic coordinators regarding change in appts needed and ok to send pt MyChart to confirm changes.    Signature:  Bonnie Walls RN, OCN

## 2024-01-29 ENCOUNTER — THERAPY VISIT (OUTPATIENT)
Dept: SPEECH THERAPY | Facility: CLINIC | Age: 74
End: 2024-01-29
Payer: COMMERCIAL

## 2024-01-29 ENCOUNTER — PATIENT OUTREACH (OUTPATIENT)
Dept: CARE COORDINATION | Facility: CLINIC | Age: 74
End: 2024-01-29

## 2024-01-29 ENCOUNTER — THERAPY VISIT (OUTPATIENT)
Dept: PHYSICAL THERAPY | Facility: CLINIC | Age: 74
End: 2024-01-29
Payer: COMMERCIAL

## 2024-01-29 DIAGNOSIS — R49.8 HYPOPHONIA: Primary | ICD-10-CM

## 2024-01-29 DIAGNOSIS — G20.A1 PARKINSON'S DISEASE WITHOUT DYSKINESIA, UNSPECIFIED WHETHER MANIFESTATIONS FLUCTUATE (H): Primary | ICD-10-CM

## 2024-01-29 DIAGNOSIS — R49.9 VOICE DISORDER: ICD-10-CM

## 2024-01-29 DIAGNOSIS — F80.0 ARTICULATION DISORDER: ICD-10-CM

## 2024-01-29 DIAGNOSIS — R26.89 IMPAIRED GAIT AND MOBILITY: ICD-10-CM

## 2024-01-29 DIAGNOSIS — G20.A1 PARKINSON'S DISEASE WITHOUT DYSKINESIA OR FLUCTUATING MANIFESTATIONS (H): ICD-10-CM

## 2024-01-29 LAB — INR HOME MONITORING: 3.2 RATIO (ref 2–3)

## 2024-01-29 PROCEDURE — 92507 TX SP LANG VOICE COMM INDIV: CPT | Mod: GN

## 2024-01-29 PROCEDURE — 97750 PHYSICAL PERFORMANCE TEST: CPT | Mod: GP

## 2024-01-29 PROCEDURE — 97110 THERAPEUTIC EXERCISES: CPT | Mod: GP

## 2024-01-29 NOTE — PROGRESS NOTES
Mini-BESTest: Balance Evaluation Systems Test    [id number] [facility] & [facility]. All rights reserved.    ANTICIPATORY BALANCE  1. SIT TO STAND  Instruction:  Cross your arms across your chest. Try not to use your hands unless you must. Do not let your legs lean  against the back of the chair when you stand. Please stand up now.   (2) Normal: Comes to stand without use of hands and stabilizes independently.  (1) Moderate: Comes to stand WITH use of hands on first attempt.  (0) Severe: Unable to stand up from chair without assistance, OR needs several attempts with use of hands.    2. RISE TO TOES  Instruction:  Place your feet shoulder width apart. Place your hands on your hips. Try to rise as high as you can onto your  toes. I will count out loud to 3 seconds. Try to hold this pose for at least 3 seconds. Look straight ahead. Rise now.   (2) Normal: Stable for 3 s with maximum height.  (1) Moderate: Heels up, but not full range (smaller than when holding hands), OR noticeable instability for 3 s.  (0) Severe: < 3 s.    3. STAND ON ONE LEG  Instruction:  Look straight ahead. Keep your hands on your hips. Lift your leg off of the ground behind you without touching or  resting your raised leg upon your other standing leg. Stay standing on one leg as long as you can. Look straight ahead. Lift  now.   Left: Time in Seconds Trial 1:_____Trial 2:_____  (2) Normal: 20 s.  (1) Moderate: < 20 s.  (0) Severe: Unable.    Right: Time in Seconds Trial 1:_____Trial 2:_____  (2) Normal: 20 s.  (1) Moderate: < 20 s.  (0) Severe: Unable    To score each side separately use the trial with the longest time.  To calculate the sub-score and total score use the side [left or right] with the lowest numerical score [i.e. the worse side].    REACTIVE POSTURAL CONTROL  4. COMPENSATORY STEPPING CORRECTION- FORWARD  Instruction:  Stand with your feet shoulder width apart, arms at your sides. Lean forward against my hands beyond  your  forward limits. When I let go, do whatever is necessary, including taking a step, to avoid a fall.   (2) Normal: Recovers independently with a single, large step (second realignment step is allowed).  (1) Moderate: More than one step used to recover equilibrium.  (0) Severe: No step, OR would fall if not caught, OR falls spontaneously.    5. COMPENSATORY STEPPING CORRECTION- BACKWARD  Instruction:  Stand with your feet shoulder width apart, arms at your sides. Lean backward against my hands beyond your  backward limits. When I let go, do whatever is necessary, including taking a step, to avoid a fall.   (2) Normal: Recovers independently with a single, large step.  (1) Moderate: More than one step used to recover equilibrium.  (0) Severe: No step, OR would fall if not caught, OR falls spontaneously.    6. COMPENSATORY STEPPING CORRECTION- LATERAL  Instruction:  Stand with your feet together, arms down at your sides. Lean into my hand beyond your sideways limit. When I  let go, do whatever is necessary, including taking a step, to avoid a fall.   Left  (2) Normal: Recovers independently with 1 step  (crossover or lateral OK).  (1) Moderate: Several steps to recover equilibrium.  (0) Severe: Falls, or cannot step.    Right  (2) Normal: Recovers independently with 1 step  (crossover or lateral OK).  (1) Moderate: Several steps to recover equilibrium.  (0) Severe: Falls, or cannot step.    To calculate the sub-score and total score use the side [left or right] with the lowest numerical score [i.e. the worse side].    SENSORY ORIENTATION  7. STANCE (FEET TOGETHER); EYES OPEN, FIRM SURFACE  Instruction:  Place your hands on your hips. Place your feet together until almost touching. Look straight ahead. Be as stable  and still as possible, until I say stop.   Time in seconds:___30_____  (2) Normal: 30 s.  (1) Moderate: < 30 s.  (0) Severe: Unable.    8. STANCE (FEET TOGETHER); EYES CLOSED, FOAM SURFACE  Instruction:   Step onto the foam. Place your hands on your hips. Place your feet together until almost touching. Be as stable  and still as possible, until I say stop. I will start timing when you close your eyes.   Time in seconds:___4_____  (2) Normal: 30 s.  (1) Moderate: < 30 s.  (0) Severe: Unable.    9. INCLINE- EYES CLOSED  Instruction:  Step onto the incline ramp. Please stand on the incline ramp with your toes toward the top. Place your feet  shoulder width apart and have your arms down at your sides. I will start timing when you close your eyes.   Time in seconds:__30______  (2) Normal: Stands independently 30 s and aligns with gravity.  (1) Moderate: Stands independently <30 s OR aligns with surface.  (0) Severe: Unable.    DYNAMIC GAIT  10. CHANGE IN GAIT SPEED  Instruction:  Begin walking at your normal speed, when I tell you  fast , walk as fast as you can. When I say  slow , walk very  slowly.   (2) Normal: Significantly changes walking speed without imbalance.  (1) Moderate: Unable to change walking speed or signs of imbalance.  (0) Severe: Unable to achieve significant change in walking speed AND signs of imbalance.    11. WALK WITH HEAD TURNS - HORIZONTAL  Instruction:  Begin walking at your normal speed, when I say  right , turn your head and look to the right. When I say  left   turn your head and look to the left. Try to keep yourself walking in a straight line.   (2) Normal: performs head turns with no change in gait speed and good balance.  (1) Moderate: performs head turns with reduction in gait speed.  (0) Severe: performs head turns with imbalance.    12. WALK WITH PIVOT TURNS  Instruction:  Begin walking at your normal speed. When I tell you to  turn and stop , turn as quickly as you can, face the  opposite direction, and stop. After the turn, your feet should be close together.   (2) Normal: Turns with feet close FAST (< 3 steps) with good balance.  (1) Moderate: Turns with feet close SLOW (>4 steps)  with good balance.  (0) Severe: Cannot turn with feet close at any speed without imbalance.    13. STEP OVER OBSTACLES  Instruction:  Begin walking at your normal speed. When you get to the box, step over it, not around it and keep walking.   (2) Normal: Able to step over box with minimal change of gait speed and with good balance.  (1) Moderate: Steps over box but touches box OR displays cautious behavior by slowing gait.  (0) Severe: Unable to step over box OR steps around box.    14. TIMED UP & GO WITH DUAL TASK [3 METER WALK]  Instruction TUG:  When I say  Go , stand up from chair, walk at your normal speed across the tape on the floor, turn around,  and come back to sit in the chair.   Instruction TUG with Dual Task:  Count backwards by threes starting at ___. When I say  Go , stand up from chair, walk at  your normal speed across the tape on the floor, turn around, and come back to sit in the chair. Continue counting backwards  the entire time.   TUG: __9.19____seconds; Dual Task TUG: __10.78______seconds  (2) Normal: No noticeable change in sitting, standing or walking while backward counting when compared to TUG without  Dual Task.  (1) Moderate: Dual Task affects either counting OR walking (>10%) when compared to the TUG without Dual Task.  (0) Severe: Stops counting while walking OR stops walking while counting.    When scoring item 14, if subject s gait speed slows more than 10% between the TUG without and with a Dual Task the score  should be decreased by a point.    REACTIVE POSTURAL CONTROL SUB SCORE: 3/ 6  ANTICIPATORY SUB SCORE: 3/ 6  SENSORY ORIENTATION SUB SCORE: 5/ 6  DYNAMIC GAIT SUB SCORE: 4/10    TOTAL SCORE: ____15____/28      Mini-BESTest: The Mini-BESTest assesses reactive postural, anticipatory, sensory orientation, and dynamic gait balance.  Gait assistive device used: none     Patient Score: 15/28  Scores of <17.5/28 have identified people with chronic stroke that have a history of  falling according to Abdoul et al 2013.   Scores of <20/32 are predictive of increased falls risk for persons with Parkinsons Disease according to Vince et al 2012.    Minimally Detectable Change (MDC) for persons with Parkinsons Disease 5.52pts per Chris et al 2011.  Minimally Clinically Significant Difference (MCID) for persons with Balance Disorders: 4pts according to Aries et al 2013.    Assessment (rationale for performing, application to patient s function & care plan): Patient displays decline compared to when evaluated. This may be due to patient reported increased fatigue this date impacting score. Recommend continue current POC to address ongoing balance deficits.   Minutes billed as physical performance test: 20

## 2024-01-29 NOTE — PROGRESS NOTES
01/29/24 0500   Appointment Info   Signing clinician's name / credentials Blanca Beltran, PT, DPT   Visits Used 6   Medical Diagnosis Parkinson's Disease, polyneuropathy   PT Tx Diagnosis impaired balance and gait   Progress Note/Certification   Start of Care Date 12/08/23   Onset of illness/injury or Date of Surgery 11/15/23   Therapy Frequency 2x/week   Predicted Duration 90 days   Certification date from 12/08/23   Certification date to 03/06/24   PT Goal 1   Goal Identifier 5xSTS   Goal Description PT will perform 5xSTS in 16 sec or less due to improvements in functional strength and mobility   Goal Progress eval: 20.62, improved to 12.37 seconds   Target Date 03/07/24   Date Met 01/29/24   PT Goal 2   Goal Identifier gait speed   Goal Description Pt will improve gait speed to >1.10 m/s   Rationale to maximize safety and independence within the community   Goal Progress eval: 0.94 m/s; 1/29: 1.02 m/s   Target Date 03/06/24   PT Goal 3   Goal Identifier miniBEST   Goal Description Pt will score 22 or greater on miniBEST due to improvements in static and dynamic balance   Rationale to maximize safety and independence with performance of ADLs and functional tasks   Goal Progress eval: 18/28, 129: declined to 15/28   Target Date 03/06/24   PT Goal 4   Goal Identifier HEP   Goal Description Pt will demonstrate independence with HEP in order to improve self management of symptoms   Goal Progress 1/29: working on exercises requrily   Target Date 03/06/24   Subjective Report   Subjective Report Reports to clinic with no more additional visits scheduled - reports he forgets to schedule them. He knows he needs more OT/SLP but not sure about needing more PT. Up to 8 minutes on TM. Missed a feeding last night - feels a little more tired as a result.   Therapeutic Procedure/Exercise   Therapeutic Procedures: strength, endurance, ROM, flexibillity minutes (20146) 20   Ther Proc 1 Aerobic Exericse   Ther Proc 1 -  Details Overground walking for warm up and balance challenges x5 minutes initially to measure gait speed and then focus on power walking x5 minutes. Review of aeroibic on TM with recommendations to slowly increase to at least 15 minutes. Check BP with patient reporting lightheadedness with BP at  103/58 mmHg. Re-assessed 5xSTS   Skilled Intervention LE strengthening, HEP review with cuing and instruction, vital monitoring   Patient Response/Progress met 5xSTS goal, overall decreased tolerance for activities this date   Ther Proc 2 HEP   Ther Proc 2 - Details Verbal review of HEP as patient with increased fatigue/lightheaded symptoms. Emphasis on balance exercises given deficits seen on examination.   Therapeutic Procedures Ther Proc 2   Eval/Assessments   Assessments Physical Performance Test/Measures   Physical Performance Test/measures   Physical Performance Test/Measurement, Minutes (90289) 20   Physical Performance Test/Measurement Details Re-assessed MiniBest   Skilled Intervention assessment, education on resuls   Patient Response/Progress noted decline in Minibest, additional time needed due to fatigue, discussion on POC this date with recommended additional visits given some progress seen but ongoing deficits and possible balance decline   Plan   Updates to plan of care recommended schedule 4-6 more visits, recommended 2x/week but patient preference to 1x/week   Plan for next session focus on balance - static and dynamic, obstacles, increase balance in HEP, rock and reach for weightshfifting, recovery stepping practice   Total Session Time   Timed Code Treatment Minutes 40   Total Treatment Time (sum of timed and untimed services) 40         PLAN  Continue therapy per current plan of care. Recommend patient schedule additional visits in line with current POC.    Beginning/End Dates of Progress Note Reporting Period:  12/8/2023  to 01/29/2024    Referring Provider:  Ángel Hill

## 2024-01-29 NOTE — PROGRESS NOTES
Clinic Care Coordination Contact  Care Coordination Clinician Chart Review    Situation: Patient chart reviewed by Care Coordinator.       Background: Care Coordination Program started: 11/5/2021. Initial assessment completed and patient-centered care plan(s) were developed with participation from patient. Lead CC handed patient off to CHW for continued outreaches.       Assessment: Per chart review, patient outreach completed by CC CHW on 11/23/24.  Patient is actively working to accomplish goal(s). Patient's goal(s) appropriate and relevant at this time. Patient is not due for updated Plan of Care.  Assessments will be completed annually or as needed/with change of patient status.      Care Plan: General  Work on increasing strength and stamina for a year after reaching 100%       Problem: HP GENERAL PROBLEM       Goal: General  work on walking without the walker, and increasing my strength and stamina.  For at least 1 year after reaching 100%.       Start Date: 12/13/2021 Expected End Date: 6/13/2024    This Visit's Progress: 30% Recent Progress: 20%    Note:     Barriers: suffers from parkinson's  Strengths: engaged in care coordination  Patient expressed understanding of goal: yes  Action steps to achieve this goal:  1. I will continue walking without the walker. No longer using the walker as of 3/15/22. Completed action step  2. I will go outside without the walker when I feel strong enough and have increased my strength and stamina.  Continue to work on for at least a year.  3. I will frequently walk around inside the house to increase my strength and stamina.  Continue to work on for at least a year.                            Care Plan: General - Working on balance and items in the path,       Problem: HP GENERAL PROBLEM       Goal: General Goal - work on balance and avoiding items in the path       Start Date: 10/28/2022 Expected End Date: 10/28/2024    This Visit's Progress: 20% Recent Progress: 10%     Note:     Barriers: parkinson's  Strengths: engaged in care coordination  Patient expressed understanding of goal: yes   Action steps to achieve this goal:  1. I will work on walking around items in my path without losing my balance.  2. I will work on maintaining my balance when I walk through the house.  3. I will use my walker or cane as needed to maintain my balance.                                   Plan/Recommendations: The patient will continue working with Care Coordination to achieve goal(s) as above. CHW will continue outreaches at minimum every 30 days and will involve Lead CC as needed or if patient is ready to move to Maintenance. Lead CC will continue to monitor CHW outreaches and patient's progress to goal(s) every 6 weeks.     Plan of Care updated and sent to patient: Sruthi.              Uriel Zhou MSN, RN, PHN, CCM   Primary Care Clinical RN Care Coordinator  [facility]  1/29/2024   3:56 PM  [email or web address]  Office: [phone]

## 2024-01-31 ENCOUNTER — LAB (OUTPATIENT)
Dept: LAB | Facility: CLINIC | Age: 74
End: 2024-01-31
Payer: COMMERCIAL

## 2024-01-31 ENCOUNTER — ANTICOAGULATION THERAPY VISIT (OUTPATIENT)
Dept: ANTICOAGULATION | Facility: CLINIC | Age: 74
End: 2024-01-31

## 2024-01-31 DIAGNOSIS — E11.22 TYPE 2 DIABETES MELLITUS WITH STAGE 2 CHRONIC KIDNEY DISEASE, WITHOUT LONG-TERM CURRENT USE OF INSULIN (H): Primary | ICD-10-CM

## 2024-01-31 DIAGNOSIS — N18.2 TYPE 2 DIABETES MELLITUS WITH STAGE 2 CHRONIC KIDNEY DISEASE, WITHOUT LONG-TERM CURRENT USE OF INSULIN (H): Primary | ICD-10-CM

## 2024-01-31 DIAGNOSIS — Z86.711 PERSONAL HISTORY OF PE (PULMONARY EMBOLISM): ICD-10-CM

## 2024-01-31 DIAGNOSIS — Z85.71 HISTORY OF HODGKIN'S LYMPHOMA: ICD-10-CM

## 2024-01-31 DIAGNOSIS — D68.61 ANTIPHOSPHOLIPID ANTIBODY SYNDROME (H): ICD-10-CM

## 2024-01-31 DIAGNOSIS — I48.0 PAROXYSMAL ATRIAL FIBRILLATION (H): ICD-10-CM

## 2024-01-31 DIAGNOSIS — Z79.01 LONG TERM CURRENT USE OF ANTICOAGULANT THERAPY: Primary | ICD-10-CM

## 2024-01-31 DIAGNOSIS — E83.110 HEREDITARY HEMOCHROMATOSIS (H): ICD-10-CM

## 2024-01-31 LAB
ALBUMIN SERPL BCG-MCNC: 3.9 G/DL (ref 3.5–5.2)
ALP SERPL-CCNC: 113 U/L (ref 40–150)
ALT SERPL W P-5'-P-CCNC: 18 U/L (ref 0–70)
ANION GAP SERPL CALCULATED.3IONS-SCNC: 8 MMOL/L (ref 7–15)
AST SERPL W P-5'-P-CCNC: 36 U/L (ref 0–45)
BASOPHILS # BLD AUTO: 0 10E3/UL (ref 0–0.2)
BASOPHILS NFR BLD AUTO: 0 %
BILIRUB SERPL-MCNC: 1 MG/DL
BUN SERPL-MCNC: 29.5 MG/DL (ref 8–23)
CALCIUM SERPL-MCNC: 9.2 MG/DL (ref 8.8–10.2)
CHLORIDE SERPL-SCNC: 98 MMOL/L (ref 98–107)
CREAT SERPL-MCNC: 1 MG/DL (ref 0.67–1.17)
CRP SERPL-MCNC: 3.95 MG/L
DEPRECATED HCO3 PLAS-SCNC: 26 MMOL/L (ref 22–29)
EGFRCR SERPLBLD CKD-EPI 2021: 78 ML/MIN/1.73M2
EOSINOPHIL # BLD AUTO: 0.1 10E3/UL (ref 0–0.7)
EOSINOPHIL NFR BLD AUTO: 1 %
ERYTHROCYTE [DISTWIDTH] IN BLOOD BY AUTOMATED COUNT: 12 % (ref 10–15)
FERRITIN SERPL-MCNC: 111 NG/ML (ref 31–409)
GLUCOSE SERPL-MCNC: 199 MG/DL (ref 70–99)
HCT VFR BLD AUTO: 46.8 % (ref 40–53)
HGB BLD-MCNC: 16.2 G/DL (ref 13.3–17.7)
IMM GRANULOCYTES # BLD: 0 10E3/UL
IMM GRANULOCYTES NFR BLD: 0 %
INR BLD: 3.2 (ref 0.9–1.1)
IRON BINDING CAPACITY (ROCHE): 247 UG/DL (ref 240–430)
IRON SATN MFR SERPL: 59 % (ref 15–46)
IRON SERPL-MCNC: 145 UG/DL (ref 61–157)
LDH SERPL L TO P-CCNC: 213 U/L (ref 0–250)
LYMPHOCYTES # BLD AUTO: 3 10E3/UL (ref 0.8–5.3)
LYMPHOCYTES NFR BLD AUTO: 34 %
MCH RBC QN AUTO: 34 PG (ref 26.5–33)
MCHC RBC AUTO-ENTMCNC: 34.6 G/DL (ref 31.5–36.5)
MCV RBC AUTO: 98 FL (ref 78–100)
MONOCYTES # BLD AUTO: 0.7 10E3/UL (ref 0–1.3)
MONOCYTES NFR BLD AUTO: 8 %
NEUTROPHILS # BLD AUTO: 5 10E3/UL (ref 1.6–8.3)
NEUTROPHILS NFR BLD AUTO: 57 %
NRBC # BLD AUTO: 0 10E3/UL
NRBC BLD AUTO-RTO: 0 /100
PLATELET # BLD AUTO: 82 10E3/UL (ref 150–450)
POTASSIUM SERPL-SCNC: 4.7 MMOL/L (ref 3.4–5.3)
PROT SERPL-MCNC: 7.5 G/DL (ref 6.4–8.3)
RBC # BLD AUTO: 4.76 10E6/UL (ref 4.4–5.9)
SODIUM SERPL-SCNC: 132 MMOL/L (ref 135–145)
WBC # BLD AUTO: 8.9 10E3/UL (ref 4–11)

## 2024-01-31 PROCEDURE — 83550 IRON BINDING TEST: CPT

## 2024-01-31 PROCEDURE — 85025 COMPLETE CBC W/AUTO DIFF WBC: CPT

## 2024-01-31 PROCEDURE — 83540 ASSAY OF IRON: CPT

## 2024-01-31 PROCEDURE — 82728 ASSAY OF FERRITIN: CPT

## 2024-01-31 PROCEDURE — 83615 LACTATE (LD) (LDH) ENZYME: CPT

## 2024-01-31 PROCEDURE — 86140 C-REACTIVE PROTEIN: CPT

## 2024-01-31 PROCEDURE — 84155 ASSAY OF PROTEIN SERUM: CPT

## 2024-01-31 PROCEDURE — 80053 COMPREHEN METABOLIC PANEL: CPT

## 2024-01-31 PROCEDURE — 36415 COLL VENOUS BLD VENIPUNCTURE: CPT

## 2024-01-31 PROCEDURE — 84165 PROTEIN E-PHORESIS SERUM: CPT | Performed by: PATHOLOGY

## 2024-01-31 PROCEDURE — 85610 PROTHROMBIN TIME: CPT

## 2024-01-31 NOTE — PROGRESS NOTES
ANTICOAGULATION MANAGEMENT     Haresh Rock 73 year old male is on warfarin with supratherapeutic INR result. (Goal INR 2.0-3.0)    Recent labs: (last 7 days)     01/31/24  1206   INR 3.2*       ASSESSMENT     Source(s): Chart Review and Patient/Caregiver Call     Warfarin doses taken: Warfarin taken as instructed  Diet: No new diet changes identified  Medication/supplement changes: None noted  New illness, injury, or hospitalization: No  Signs or symptoms of bleeding or clotting: No  Previous result: Supratherapeutic  Additional findings:  Pt states that he has been trying to get more exercises in during the day, plans to be ongoing.    Still waiting on receiving new home meter, will schedule next INR appointment in clinic.        PLAN     Recommended plan for ongoing change(s) affecting INR     Dosing Instructions: decrease your warfarin dose (5.6% change) with next INR in 2 weeks       Summary  As of 1/31/2024      Full warfarin instructions:  3.75 mg every Sun, Tue, Fri; 2.5 mg all other days   Next INR check:  2/14/2024               Telephone call with Haresh who verbalizes understanding and agrees to plan    Lab visit scheduled    Education provided:   Goal range and lab monitoring: goal range and significance of current result and Importance of therapeutic range    Plan made per [facility] anticoagulation protocol    Martín Rico RN  Anticoagulation Clinic  1/31/2024    _______________________________________________________________________     Anticoagulation Episode Summary       Current INR goal:  2.0-3.0   TTR:  84.9% (1 y)   Target end date:  Indefinite   Send INR reminders to:  [facility]    Indications    Long-term (current) use of anticoagulants [Z79.01] [Z79.01]  Atrial fibrillation (H) (Resolved) [I48.91]  Antiphospholipid antibody syndrome (H24) (Resolved) [D68.61]  Personal history of DVT (deep vein thrombosis) (Resolved) [Z86.718]  Atrial fibrillation  unspecified type (H) (Resolved)  [I48.91]  Paroxysmal atrial fibrillation (H) [I48.0]  Chronic atrial fibrillation (H) (Resolved) [I48.20]  Personal history of PE (pulmonary embolism) [Z86.711]             Comments:  JESSICA rodas to manage by exception             Anticoagulation Care Providers       Provider Role Specialty Phone number    Anya Nowak MD Referring Family Medicine [phone]    Elizabeth Balderas MD Referring HOSPITALIST [phone]

## 2024-02-01 LAB
ALBUMIN SERPL ELPH-MCNC: 3.6 G/DL (ref 3.7–5.1)
ALPHA1 GLOB SERPL ELPH-MCNC: 0.3 G/DL (ref 0.2–0.4)
ALPHA2 GLOB SERPL ELPH-MCNC: 0.6 G/DL (ref 0.5–0.9)
B-GLOBULIN SERPL ELPH-MCNC: 0.9 G/DL (ref 0.6–1)
GAMMA GLOB SERPL ELPH-MCNC: 1.6 G/DL (ref 0.7–1.6)
LOCATION OF TASK: ABNORMAL
M PROTEIN SERPL ELPH-MCNC: 0.2 G/DL
PROT PATTERN SERPL ELPH-IMP: ABNORMAL
TOTAL PROTEIN SERUM FOR ELP: 7.1 G/DL (ref 6.4–8.3)

## 2024-02-06 DIAGNOSIS — K92.0 HEMATEMESIS WITH NAUSEA: ICD-10-CM

## 2024-02-07 ENCOUNTER — VIRTUAL VISIT (OUTPATIENT)
Dept: TRANSPLANT | Facility: CLINIC | Age: 74
End: 2024-02-07
Attending: INTERNAL MEDICINE
Payer: COMMERCIAL

## 2024-02-07 VITALS — WEIGHT: 173 LBS | BODY MASS INDEX: 23.43 KG/M2 | HEIGHT: 72 IN

## 2024-02-07 DIAGNOSIS — Z85.71 HISTORY OF HODGKIN'S LYMPHOMA: ICD-10-CM

## 2024-02-07 DIAGNOSIS — Z94.81 BONE MARROW TRANSPLANT STATUS (H): ICD-10-CM

## 2024-02-07 DIAGNOSIS — K80.70 CALCULUS OF GALLBLADDER AND BILE DUCT WITHOUT CHOLECYSTITIS OR OBSTRUCTION: ICD-10-CM

## 2024-02-07 DIAGNOSIS — Z79.01 LONG TERM CURRENT USE OF ANTICOAGULANT THERAPY: Primary | ICD-10-CM

## 2024-02-07 DIAGNOSIS — D89.811 GRAFT-VERSUS-HOST DISEASE, CHRONIC (H): ICD-10-CM

## 2024-02-07 DIAGNOSIS — K74.69 OTHER CIRRHOSIS OF LIVER (H): ICD-10-CM

## 2024-02-07 PROCEDURE — 99213 OFFICE O/P EST LOW 20 MIN: CPT | Mod: 95 | Performed by: INTERNAL MEDICINE

## 2024-02-07 ASSESSMENT — PAIN SCALES - GENERAL: PAINLEVEL: NO PAIN (0)

## 2024-02-07 NOTE — LETTER
2/7/2024         RE: Haresh Rock  [address] [place] [address]        Dear Colleague,    Thank you for referring your patient, Haresh Rock, to the [facility] [place]. Please see a copy of my visit note below.    Virtual Visit Details    Type of service:  Video Visit   Video Start Time: 1240  Video End Time: 105    Originating Location (pt. Location): Home    Distant Location (provider location):  On-site  Platform used for Video Visit: Good Hutson returns to the [facility] for history of a JAK2 positive atypical myeloproliferative syndrome, status post nonmyeloablative allo-sib peripheral blood stem cell transplant in 2007, complicated by chronic dvpjr-vnqzom-afei disease, cirrhosis, hemochromatosis with C282Y homozygosity, pulmonary emboli and DVT as well as paroxysmal atrial fibrillation for which he is on warfarin, Hodgkin disease in 2011 s/p ABVD x 5 cycles, and a history of cardiac arrhythmias with an ablation. He was  diagnosed by Dr. Ángel Hill with Parkinson disease, with weight loss, dysphagia with aspiration, requiring G tube placement, orthostasis, urinary obstruction. Most recent CT and PET 2/26/21 were without evidence for recurrence of malignancy. Hypermetabolic activity at the anorectal junction with diffuse wall thickening was worked up by 3/17/21 flex sigmoidoscopy which showed a 4 mm polyp (removed, tubular adenoma), and erythematous mucosa that was biopsied and found superficial vascular congestion and melanosis coli without evidence for GVHD. Had full colonoscopy 4/14/21 without other findings. He had TURP with Dr. Hawley for urinary obstruction 8/9/21, as medical therapy was causing too much orthostasis.  Haresh was hospitalized in October and had a small bowel resection because of a G tube complication. G tube was replaced.     INTERVAL HISTORY  Haresh is feeling well. He has dysphagia related to Parkinson's He  can still swallow his morning and midday pills but when he has to take many he crushes them and uses G tube instead. He does not take any food by mouth He continues to drink about 1 cup of Mountain Dew soda each day to help with feelings of dryness and cough.       He continues to receive 3200 calories/day using his G tube. .       He continues to experience polyuria and nocturia ( 4x/night). He has also had mild reflux at night. .He healed an ulcer on 1st toe left foot.It has healed after cellulose and cleaning.     He had dental cleaning and had a fever and chills that evening despite taking amoxicillin before the procedure. This did not progress and fever subsided.  He has not had a phlebotomy for a while for his iron overload  Today ferritin 111 iron sat 59%  No fever or chills. Weight is stable~ 173. No cough       PAST MEDICAL HISTORY:  See my note from 10/2023     SOCIAL HISTORY:  See my note from 10/2023     FAMILY HISTORY:  See my note from  010/2023     REVIEW OF SYSTEMS:  A 12-point review of systems is done and is negative, except as in the HPI.      Latest Reference Range & Units 01/31/24 12:06   Sodium 135 - 145 mmol/L 132 (L)   Potassium 3.4 - 5.3 mmol/L 4.7   Chloride 98 - 107 mmol/L 98   Carbon Dioxide (CO2) 22 - 29 mmol/L 26   Urea Nitrogen 8.0 - 23.0 mg/dL 29.5 (H)   Creatinine 0.67 - 1.17 mg/dL 1.00   GFR Estimate >60 mL/min/1.73m2 78   Calcium 8.8 - 10.2 mg/dL 9.2   Anion Gap 7 - 15 mmol/L 8   Albumin 3.5 - 5.2 g/dL 3.9   Protein Total 6.4 - 8.3 g/dL 7.5   Alkaline Phosphatase 40 - 150 U/L 113   ALT 0 - 70 U/L 18   AST 0 - 45 U/L 36   Bilirubin Total <=1.2 mg/dL 1.0   CRP Inflammation <5.00 mg/L 3.95   Ferritin 31 - 409 ng/mL 111   Glucose 70 - 99 mg/dL 199 (H)   Iron 61 - 157 ug/dL 145   Iron Binding Capacity 240 - 430 ug/dL 247   Iron Sat Index 15 - 46 % 59 (H)   Lactate Dehydrogenase 0 - 250 U/L 213   WBC 4.0 - 11.0 10e3/uL 8.9   Hemoglobin 13.3 - 17.7 g/dL 16.2   Hematocrit 40.0 - 53.0 %  46.8   Platelet Count 150 - 450 10e3/uL 82 (L)   RBC Count 4.40 - 5.90 10e6/uL 4.76   MCV 78 - 100 fL 98   MCH 26.5 - 33.0 pg 34.0 (H)   MCHC 31.5 - 36.5 g/dL 34.6   RDW 10.0 - 15.0 % 12.0   % Neutrophils % 57   % Lymphocytes % 34   % Monocytes % 8   % Eosinophils % 1   % Basophils % 0   Absolute Basophils 0.0 - 0.2 10e3/uL 0.0   Absolute Eosinophils 0.0 - 0.7 10e3/uL 0.1   Absolute Immature Granulocytes <=0.4 10e3/uL 0.0   Absolute Lymphocytes 0.8 - 5.3 10e3/uL 3.0   Absolute Monocytes 0.0 - 1.3 10e3/uL 0.7   % Immature Granulocytes % 0   Absolute Neutrophils 1.6 - 8.3 10e3/uL 5.0   Absolute NRBCs 10e3/uL 0.0   NRBCs per 100 WBC <1 /100 0   INR Point of Care 0.9 - 1.1  3.2 (H)   Albumin Fraction 3.7 - 5.1 g/dL 3.6 (L)   Alpha 1 Fraction 0.2 - 0.4 g/dL 0.3   Alpha 2 Fraction 0.5 - 0.9 g/dL 0.6   Beta Fraction 0.6 - 1.0 g/dL 0.9   ELP Interpretation:  Small monoclonal protein (about 0.2 g/dL) seen in the gamma fraction, not previously characterized in our laboratory. Consider a serum and a urine immunofixation for confirmation and further characterization if clinically indicated and not previously performed elsewhere. Slight hypoalbuminemia. Pathologic significance requires clinical correlation. NEELAM Barahona M.D., Ph.D., Pathologist ().   Gamma Fraction 0.7 - 1.6 g/dL 1.6   Monoclonal Peak <=0.0 g/dL 0.2 (H)   Total Protein Serum for ELP 6.4 - 8.3 g/dL 7.1   (L): Data is abnormally low  (H): Data is abnormally high    PHYSICAL EXAMINATION:  By the video, he is an alert gentleman, verbal, in no acute distress.     EYES:  Grossly normal to inspection, no discharge, erythema or icterus.   SKIN:  Visible skin is clear.  No significant rash or abnormal pigmentation.   NEUROLOGIC:  Cranial nerves seem to be intact.  Mentation and speech is appropriate for age.   PSYCHIATRIC:  Mentation appears normal.  He does not seem anxious today.   ASSESSMENT:     1.  Myeloproliferative syndrome/MDS. STEVIE 2 positive  2.   Status post non-myeloablative allo-sib peripheral blood stem cell transplant 2007  3.  History of chronic ncqlu-tcjvrv-zwap disease.   4.  History of Hodgkin's disease 2011 s/p ABVD x 5 cycles.   5.  History of cardiac arrhythmias, SVT and V tach.   6.  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   7.  History of cirrhosis.   8.  History of pulmonary emboli.   9.  History of elevated hemoglobin A1c.   10. Thyroid nodule.    11. Cholelithiasis.     12. Diverticulosis.   13. Anticoagulation with warfarin for history of DVT, PE, and paroxysmal atrial fibrillation  14. Status post bilateral knee replacement.     15. Aortic stenosis  16. 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  17. Seborrheic keratosis of the back.  18. Weight loss, dysphagia, anorexia related to Parkinson's  19. Parkinson's  20. 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 Chronic aspiration related to dysphagia  22. Thrombocytopenia  23. GERD  24. MGUS     ASSESSMENT:    Haresh's platelet count  is decreased at 82k. . Will get US of abdomen with hx of cirrhosis  and look at spleen size.in 4 mo He has maintained his weight since Oct2023 (173lbs today). I wonder if he has portal hypertension and splenic sequestration due to previous cirrhosis.  Will not do a phlebotomy at this time Continue warfarin, and will adjust with Coumadin clinic   MGUS is new Will get IgG IgM IgA light chains and immunofixation next week         Labs Feb 14  US Abdomen 4 mo  I will see Haresh again in 4 months.     I spent a total of 25 minutes face to face with Haresh Rock during today's office visit. Over 50% of this time was spent counseling the patient and coordinating the care regarding Hodgkins and BMT and 10 minutes preparing to see the patient and  care coordination      Augusto Miller MD  [phone]

## 2024-02-07 NOTE — PROGRESS NOTES
Virtual Visit Details    Type of service:  Video Visit   Video Start Time: 1240  Video End Time: 105    Originating Location (pt. Location): Home    Distant Location (provider location):  On-site  Platform used for Video Visit: Good Hutson returns to the [facility] for history of a JAK2 positive atypical myeloproliferative syndrome, status post nonmyeloablative allo-sib peripheral blood stem cell transplant in 2007, complicated by chronic jelxc-algqen-gdzd disease, cirrhosis, hemochromatosis with C282Y homozygosity, pulmonary emboli and DVT as well as paroxysmal atrial fibrillation for which he is on warfarin, Hodgkin disease in 2011 s/p ABVD x 5 cycles, and a history of cardiac arrhythmias with an ablation. He was  diagnosed by Dr. Ángel Hill with Parkinson disease, with weight loss, dysphagia with aspiration, requiring G tube placement, orthostasis, urinary obstruction. Most recent CT and PET 2/26/21 were without evidence for recurrence of malignancy. Hypermetabolic activity at the anorectal junction with diffuse wall thickening was worked up by 3/17/21 flex sigmoidoscopy which showed a 4 mm polyp (removed, tubular adenoma), and erythematous mucosa that was biopsied and found superficial vascular congestion and melanosis coli without evidence for GVHD. Had full colonoscopy 4/14/21 without other findings. He had TURP with Dr. Hawley for urinary obstruction 8/9/21, as medical therapy was causing too much orthostasis.  Haresh was hospitalized in October and had a small bowel resection because of a G tube complication. G tube was replaced.     INTERVAL HISTORY  Haresh is feeling well. He has dysphagia related to Parkinson's He can still swallow his morning and midday pills but when he has to take many he crushes them and uses G tube instead. He does not take any food by mouth He continues to drink about 1 cup of Mountain Dew soda each day to help with feelings of dryness and cough.       He  continues to receive 3200 calories/day using his G tube. .       He continues to experience polyuria and nocturia ( 4x/night). He has also had mild reflux at night. .He healed an ulcer on 1st toe left foot.It has healed after cellulose and cleaning.     He had dental cleaning and had a fever and chills that evening despite taking amoxicillin before the procedure. This did not progress and fever subsided.  He has not had a phlebotomy for a while for his iron overload  Today ferritin 111 iron sat 59%  No fever or chills. Weight is stable~ 173. No cough       PAST MEDICAL HISTORY:  See my note from 10/2023     SOCIAL HISTORY:  See my note from 10/2023     FAMILY HISTORY:  See my note from  010/2023     REVIEW OF SYSTEMS:  A 12-point review of systems is done and is negative, except as in the HPI.      Latest Reference Range & Units 01/31/24 12:06   Sodium 135 - 145 mmol/L 132 (L)   Potassium 3.4 - 5.3 mmol/L 4.7   Chloride 98 - 107 mmol/L 98   Carbon Dioxide (CO2) 22 - 29 mmol/L 26   Urea Nitrogen 8.0 - 23.0 mg/dL 29.5 (H)   Creatinine 0.67 - 1.17 mg/dL 1.00   GFR Estimate >60 mL/min/1.73m2 78   Calcium 8.8 - 10.2 mg/dL 9.2   Anion Gap 7 - 15 mmol/L 8   Albumin 3.5 - 5.2 g/dL 3.9   Protein Total 6.4 - 8.3 g/dL 7.5   Alkaline Phosphatase 40 - 150 U/L 113   ALT 0 - 70 U/L 18   AST 0 - 45 U/L 36   Bilirubin Total <=1.2 mg/dL 1.0   CRP Inflammation <5.00 mg/L 3.95   Ferritin 31 - 409 ng/mL 111   Glucose 70 - 99 mg/dL 199 (H)   Iron 61 - 157 ug/dL 145   Iron Binding Capacity 240 - 430 ug/dL 247   Iron Sat Index 15 - 46 % 59 (H)   Lactate Dehydrogenase 0 - 250 U/L 213   WBC 4.0 - 11.0 10e3/uL 8.9   Hemoglobin 13.3 - 17.7 g/dL 16.2   Hematocrit 40.0 - 53.0 % 46.8   Platelet Count 150 - 450 10e3/uL 82 (L)   RBC Count 4.40 - 5.90 10e6/uL 4.76   MCV 78 - 100 fL 98   MCH 26.5 - 33.0 pg 34.0 (H)   MCHC 31.5 - 36.5 g/dL 34.6   RDW 10.0 - 15.0 % 12.0   % Neutrophils % 57   % Lymphocytes % 34   % Monocytes % 8   % Eosinophils % 1   %  Basophils % 0   Absolute Basophils 0.0 - 0.2 10e3/uL 0.0   Absolute Eosinophils 0.0 - 0.7 10e3/uL 0.1   Absolute Immature Granulocytes <=0.4 10e3/uL 0.0   Absolute Lymphocytes 0.8 - 5.3 10e3/uL 3.0   Absolute Monocytes 0.0 - 1.3 10e3/uL 0.7   % Immature Granulocytes % 0   Absolute Neutrophils 1.6 - 8.3 10e3/uL 5.0   Absolute NRBCs 10e3/uL 0.0   NRBCs per 100 WBC <1 /100 0   INR Point of Care 0.9 - 1.1  3.2 (H)   Albumin Fraction 3.7 - 5.1 g/dL 3.6 (L)   Alpha 1 Fraction 0.2 - 0.4 g/dL 0.3   Alpha 2 Fraction 0.5 - 0.9 g/dL 0.6   Beta Fraction 0.6 - 1.0 g/dL 0.9   ELP Interpretation:  Small monoclonal protein (about 0.2 g/dL) seen in the gamma fraction, not previously characterized in our laboratory. Consider a serum and a urine immunofixation for confirmation and further characterization if clinically indicated and not previously performed elsewhere. Slight hypoalbuminemia. Pathologic significance requires clinical correlation. NEELAM Barahona M.D., Ph.D., Pathologist ().   Gamma Fraction 0.7 - 1.6 g/dL 1.6   Monoclonal Peak <=0.0 g/dL 0.2 (H)   Total Protein Serum for ELP 6.4 - 8.3 g/dL 7.1   (L): Data is abnormally low  (H): Data is abnormally high    PHYSICAL EXAMINATION:  By the video, he is an alert gentleman, verbal, in no acute distress.     EYES:  Grossly normal to inspection, no discharge, erythema or icterus.   SKIN:  Visible skin is clear.  No significant rash or abnormal pigmentation.   NEUROLOGIC:  Cranial nerves seem to be intact.  Mentation and speech is appropriate for age.   PSYCHIATRIC:  Mentation appears normal.  He does not seem anxious today.   ASSESSMENT:     1.  Myeloproliferative syndrome/MDS. STEVIE 2 positive  2.  Status post non-myeloablative allo-sib peripheral blood stem cell transplant 2007  3.  History of chronic ejihv-uzmmky-wfbt disease.   4.  History of Hodgkin's disease 2011 s/p ABVD x 5 cycles.   5.  History of cardiac arrhythmias, SVT and V tach.   6.  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   7.  History of cirrhosis.   8.  History of pulmonary emboli.   9.  History of elevated hemoglobin A1c.   10. Thyroid nodule.    11. Cholelithiasis.     12. Diverticulosis.   13. Anticoagulation with warfarin for history of DVT, PE, and paroxysmal atrial fibrillation  14. Status post bilateral knee replacement.     15. Aortic stenosis  16. 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  17. Seborrheic keratosis of the back.  18. Weight loss, dysphagia, anorexia related to Parkinson's  19. Parkinson's  20. 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 Chronic aspiration related to dysphagia  22. Thrombocytopenia  23. GERD  24. MGUS     ASSESSMENT:    Haresh's platelet count  is decreased at 82k. . Will get US of abdomen with hx of cirrhosis  and look at spleen size.in 4 mo He has maintained his weight since Oct2023 (173lbs today). I wonder if he has portal hypertension and splenic sequestration due to previous cirrhosis.  Will not do a phlebotomy at this time Continue warfarin, and will adjust with Coumadin clinic   MGUS is new Will get IgG IgM IgA light chains and immunofixation next week         Labs Feb 14  US Abdomen 4 mo  I will see Haresh again in 4 months.     I spent a total of 25 minutes face to face with Haresh Rock during today's office visit. Over 50% of this time was spent counseling the patient and coordinating the care regarding Hodgkins and BMT and 10 minutes preparing to see the patient and  care coordination      Augusto Miller MD  [phone]

## 2024-02-07 NOTE — NURSING NOTE
Is the patient currently in the state of [place]? YES    Visit mode:VIDEO    If the visit is dropped, the patient can be reconnected by: VIDEO VISIT: Send to e-mail at: [email or web address]    Will anyone else be joining the visit? NO  (If patient encounters technical issues they should call [phone]    How would you like to obtain your AVS? MyChart    Are changes needed to the allergy or medication list? Pt declined med review    Reason for visit: JIHAN GEORGE

## 2024-02-09 ENCOUNTER — PATIENT OUTREACH (OUTPATIENT)
Dept: ONCOLOGY | Facility: CLINIC | Age: 74
End: 2024-02-09
Payer: COMMERCIAL

## 2024-02-09 ENCOUNTER — ANTICOAGULATION THERAPY VISIT (OUTPATIENT)
Dept: ANTICOAGULATION | Facility: CLINIC | Age: 74
End: 2024-02-09
Payer: COMMERCIAL

## 2024-02-09 ENCOUNTER — TRANSFERRED RECORDS (OUTPATIENT)
Dept: HEALTH INFORMATION MANAGEMENT | Facility: CLINIC | Age: 74
End: 2024-02-09
Payer: COMMERCIAL

## 2024-02-09 DIAGNOSIS — Z86.711 PERSONAL HISTORY OF PE (PULMONARY EMBOLISM): ICD-10-CM

## 2024-02-09 DIAGNOSIS — I48.0 PAROXYSMAL ATRIAL FIBRILLATION (H): ICD-10-CM

## 2024-02-09 DIAGNOSIS — Z79.01 LONG TERM CURRENT USE OF ANTICOAGULANT THERAPY: Primary | ICD-10-CM

## 2024-02-09 LAB — INR HOME MONITORING: 2.6 RATIO (ref 2–3)

## 2024-02-09 NOTE — PROGRESS NOTES
[facility]: Cancer Care                                                                                        Received message from a clinic coordinator on 2/8 in the afternoon, stating pt declining to schedule US.  Spoke with pt this afternoon.  He reported pt was only trying to schedule US in 4 months and not return visit with Dr. Geovani Miller.  Discussed he may have been speaking with a newer staff member who did not see return appt request with the US.  Pt agreed to warm transfer to [facility] clinic coordinator to work on scheduling US, labs and return visit with Dr. Geovani Miller in June.    Signature:  Bonnie Walls, RN, OCN

## 2024-02-09 NOTE — PROGRESS NOTES
ANTICOAGULATION MANAGEMENT     Haresh Rock 73 year old male is on warfarin with therapeutic INR result. (Goal INR 2.0-3.0)    Recent labs: (last 7 days)     02/09/24  1019   INR 2.6       ASSESSMENT     Source(s): Chart Review and Patient/Caregiver Call     Warfarin doses taken: Warfarin taken as instructed  Diet: No new diet changes identified  Medication/supplement changes: None noted  New illness, injury, or hospitalization: No  Signs or symptoms of bleeding or clotting: No  Previous result: Supratherapeutic  Additional findings: None       PLAN     Recommended plan for no diet, medication or health factor changes affecting INR     Dosing Instructions: Continue your current warfarin dose with next INR in 1 week       Summary  As of 2/9/2024      Full warfarin instructions:  3.75 mg every Sun, Tue, Fri; 2.5 mg all other days   Next INR check:  2/16/2024               Telephone call with Haresh who verbalizes understanding and agrees to plan    Patient to recheck with home meter    Education provided:   Please call back if any changes to your diet, medications or how you've been taking warfarin  Resume manage by exception with home monitor. Continue to submit INR results to home monitor company.You will only be called when your result is out of range. Please call and notify [facility] if new medication started, dose missed, signs or symptoms of bleeding or clotting, or a surgery/procedure is scheduled.  Contact [phone]  with any changes, questions or concerns.     Plan made per [facility] anticoagulation protocol    Akilah Sharp RN  Anticoagulation Clinic  2/9/2024    _______________________________________________________________________     Anticoagulation Episode Summary       Current INR goal:  2.0-3.0   TTR:  86.5% (1 y)   Target end date:  Indefinite   Send INR reminders to:  [facility] MONITOR[facility]    Indications    Long-term (current) use of anticoagulants [Z79.01] [Z79.01]  Atrial fibrillation (H) (Resolved)  [I48.91]  Antiphospholipid antibody syndrome (H24) (Resolved) [D68.61]  Personal history of DVT (deep vein thrombosis) (Resolved) [Z86.718]  Atrial fibrillation  unspecified type (H) (Resolved) [I48.91]  Paroxysmal atrial fibrillation (H) [I48.0]  Chronic atrial fibrillation (H) (Resolved) [I48.20]  Personal history of PE (pulmonary embolism) [Z86.711]             Comments:  JESSICA rodas to manage by exception             Anticoagulation Care Providers       Provider Role Specialty Phone number    Anya Nowak MD Referring Family Medicine [phone]    Elizabeth Balderas MD Referring HOSPITALIST [phone]

## 2024-02-13 ENCOUNTER — PATIENT OUTREACH (OUTPATIENT)
Dept: CARE COORDINATION | Facility: CLINIC | Age: 74
End: 2024-02-13

## 2024-02-13 ENCOUNTER — THERAPY VISIT (OUTPATIENT)
Dept: SPEECH THERAPY | Facility: CLINIC | Age: 74
End: 2024-02-13
Payer: COMMERCIAL

## 2024-02-13 DIAGNOSIS — R49.9 VOICE DISORDER: ICD-10-CM

## 2024-02-13 DIAGNOSIS — R49.8 HYPOPHONIA: Primary | ICD-10-CM

## 2024-02-13 DIAGNOSIS — G20.A1 PARKINSON'S DISEASE WITHOUT DYSKINESIA OR FLUCTUATING MANIFESTATIONS (H): ICD-10-CM

## 2024-02-13 DIAGNOSIS — F80.0 ARTICULATION DISORDER: ICD-10-CM

## 2024-02-13 PROCEDURE — 92507 TX SP LANG VOICE COMM INDIV: CPT | Mod: GN

## 2024-02-13 NOTE — PROGRESS NOTES
Clinic Care Coordination Contact  Community Health Worker Follow Up    Care Gaps:     Health Maintenance Due   Topic Date Due    RSV VACCINE (Pregnancy & 60+) (1 - 1-dose 60+ series) Never done    ZOSTER IMMUNIZATION (1 of 2) 11/14/2016    IPV IMMUNIZATION (3 of 3 - Adult catch-up series) 03/19/2017    DTAP/TDAP/TD IMMUNIZATION (2 - Td or Tdap) 09/11/2022    DIABETIC FOOT EXAM  10/11/2023    PHQ-2 (once per calendar year)  01/01/2024    EYE EXAM  02/13/2024       Patient had eye exam on 11/6. Will discuss other care gaps at next PCP visit.     Care Plan:   Care Plan: General  Work on increasing strength and stamina for a year after reaching 100%       Problem: HP GENERAL PROBLEM       Goal: General  work on walking without the walker, and increasing my strength and stamina.  For at least 1 year after reaching 100%.       Start Date: 12/13/2021 Expected End Date: 6/13/2024    This Visit's Progress: 40% Recent Progress: 30%    Note:     Barriers: suffers from parkinson's  Strengths: engaged in care coordination  Patient expressed understanding of goal: yes  Action steps to achieve this goal:  1. I will continue walking without the walker. No longer using the walker as of 3/15/22. Completed action step  2. I will go outside without the walker when I feel strong enough and have increased my strength and stamina.  Continue to work on for at least a year.  3. I will frequently walk around inside the house to increase my strength and stamina.  Continue to work on for at least a year.                            Care Plan: General - Working on balance and items in the path,       Problem: HP GENERAL PROBLEM       Goal: General Goal - work on balance and avoiding items in the path       Start Date: 10/28/2022 Expected End Date: 10/28/2024    This Visit's Progress: 30% Recent Progress: 20%    Note:     Barriers: parkinson's  Strengths: engaged in care coordination  Patient expressed understanding of goal: yes   Action steps to  achieve this goal:  1. I will work on walking around items in my path without losing my balance.  2. I will work on maintaining my balance when I walk through the house.  3. I will use my walker or cane as needed to maintain my balance.                                Intervention and Education during outreach: Patient states that he is doing well with his current therapies, OT, PT and Speech. He has to drive about 30 minutes to these appointments so he tries to schedule them together and does the exercises at home between visits.     He has had no recent falls.    CHW Plan: CHW will continue to support patient with goals through routine scheduled outreach.     Next outreach due: 3/12/24

## 2024-02-14 ENCOUNTER — LAB (OUTPATIENT)
Dept: LAB | Facility: CLINIC | Age: 74
End: 2024-02-14
Payer: COMMERCIAL

## 2024-02-14 ENCOUNTER — OFFICE VISIT (OUTPATIENT)
Dept: SURGERY | Facility: CLINIC | Age: 74
End: 2024-02-14
Payer: COMMERCIAL

## 2024-02-14 VITALS
HEIGHT: 71 IN | DIASTOLIC BLOOD PRESSURE: 66 MMHG | HEART RATE: 66 BPM | OXYGEN SATURATION: 98 % | SYSTOLIC BLOOD PRESSURE: 122 MMHG | WEIGHT: 179.4 LBS | BODY MASS INDEX: 25.11 KG/M2

## 2024-02-14 DIAGNOSIS — Z85.71 HISTORY OF HODGKIN'S LYMPHOMA: ICD-10-CM

## 2024-02-14 DIAGNOSIS — Z79.01 LONG TERM CURRENT USE OF ANTICOAGULANT THERAPY: ICD-10-CM

## 2024-02-14 DIAGNOSIS — Z43.1 ATTENTION TO G-TUBE (H): Primary | ICD-10-CM

## 2024-02-14 DIAGNOSIS — D89.811 GRAFT-VERSUS-HOST DISEASE, CHRONIC (H): ICD-10-CM

## 2024-02-14 PROCEDURE — 86334 IMMUNOFIX E-PHORESIS SERUM: CPT | Performed by: PATHOLOGY

## 2024-02-14 PROCEDURE — 36415 COLL VENOUS BLD VENIPUNCTURE: CPT | Performed by: PATHOLOGY

## 2024-02-14 PROCEDURE — 99000 SPECIMEN HANDLING OFFICE-LAB: CPT | Performed by: PATHOLOGY

## 2024-02-14 PROCEDURE — 83521 IG LIGHT CHAINS FREE EACH: CPT | Performed by: INTERNAL MEDICINE

## 2024-02-14 PROCEDURE — 82784 ASSAY IGA/IGD/IGG/IGM EACH: CPT | Performed by: INTERNAL MEDICINE

## 2024-02-14 PROCEDURE — 43762 RPLC GTUBE NO REVJ TRC: CPT | Performed by: SURGERY

## 2024-02-14 PROCEDURE — 86334 IMMUNOFIX E-PHORESIS SERUM: CPT | Mod: 26 | Performed by: PATHOLOGY

## 2024-02-14 ASSESSMENT — PAIN SCALES - GENERAL: PAINLEVEL: NO PAIN (0)

## 2024-02-14 NOTE — NURSING NOTE
"Chief Complaint   Patient presents with    RECHECK     Jerrell-key tube exchange       Vitals:    02/14/24 1049   BP: 122/66   BP Location: Left arm   Patient Position: Sitting   Cuff Size: Adult Regular   Pulse: 66   SpO2: 98%   Weight: 81.4 kg (179 lb 6.4 oz)   Height: 1.803 m (5' 11\")       Body mass index is 25.02 kg/m .                          Devendra Zepeda, EMT    "

## 2024-02-14 NOTE — PATIENT INSTRUCTIONS
You met with Dr. Tyler Philippe.      Today's visit instructions:    Return to the Surgery Clinic on an as needed basis.        If you have questions please contact Annabelle RN or Keya RN during regular clinic hours, Monday through Friday 7:30 AM - 4:00 PM, or you can contact us via [employer] at anytime.       If you have urgent needs after-hours, weekends, or holidays please call the hospital at [phone] and ask to speak with our on-call General Surgery Team.    Appointment [phone]  Nurse Advice (Annabelle or Keya): [phone]   Surgery Scheduler (Amber): [phone]  Fax: [phone]

## 2024-02-14 NOTE — LETTER
"2/14/2024       RE: Haresh Rock  [address] [facility] [address]     Dear Colleague,    Thank you for referring your patient, Haresh Rock, to the [facility] SURG[facility]. Please see a copy of my visit note below.    I saw Haresh Rock for exchange of his Gtube.    He reports some leakage thru the cap of the tube. He thinks it was last replaced 5 months ago. No other concerns.    PE:  Alert, pleasant  RRR  No resp distress  Abd is soft, Gtube in place. No erythema surrounding it    /66 (BP Location: Left arm, Patient Position: Sitting, Cuff Size: Adult Regular)   Pulse 66   Ht 1.803 m (5' 11\")   Wt 81.4 kg (179 lb 6.4 oz)   SpO2 98%   BMI 25.02 kg/m      I removed the water from the balloon of the existing tube. I removed it and replaced it with another 2cm 20F WILTON Gtube after lubricating it.  No issues. I instilled sterile water into the tube.      A/P:  Gtube replaced.    Total time spent on this encounter was 10 minutes              Again, thank you for allowing me to participate in the care of your patient.      Sincerely,    Tyler Philippe MD    "

## 2024-02-14 NOTE — PROGRESS NOTES
"I saw Haresh Rock for exchange of his Gtube.    He reports some leakage thru the cap of the tube. He thinks it was last replaced 5 months ago. No other concerns.    PE:  Alert, pleasant  RRR  No resp distress  Abd is soft, Gtube in place. No erythema surrounding it    /66 (BP Location: Left arm, Patient Position: Sitting, Cuff Size: Adult Regular)   Pulse 66   Ht 1.803 m (5' 11\")   Wt 81.4 kg (179 lb 6.4 oz)   SpO2 98%   BMI 25.02 kg/m      I removed the water from the balloon of the existing tube. I removed it and replaced it with another 2cm 20F WILTON Gtube after lubricating it.  No issues. I instilled sterile water into the tube.      A/P:  Gtube replaced.    Total time spent on this encounter was 10 minutes  "

## 2024-02-15 LAB
IGA SERPL-MCNC: 612 MG/DL (ref 84–499)
IGG SERPL-MCNC: 1499 MG/DL (ref 610–1616)
IGM SERPL-MCNC: 216 MG/DL (ref 35–242)
KAPPA LC FREE SER-MCNC: 8.28 MG/DL (ref 0.33–1.94)
KAPPA LC FREE/LAMBDA FREE SER NEPH: 2.29 {RATIO} (ref 0.26–1.65)
LAMBDA LC FREE SERPL-MCNC: 3.62 MG/DL (ref 0.57–2.63)

## 2024-02-16 ENCOUNTER — ANTICOAGULATION THERAPY VISIT (OUTPATIENT)
Dept: ANTICOAGULATION | Facility: CLINIC | Age: 74
End: 2024-02-16
Payer: COMMERCIAL

## 2024-02-16 DIAGNOSIS — Z79.01 LONG TERM CURRENT USE OF ANTICOAGULANT THERAPY: Primary | ICD-10-CM

## 2024-02-16 DIAGNOSIS — I48.0 PAROXYSMAL ATRIAL FIBRILLATION (H): ICD-10-CM

## 2024-02-16 DIAGNOSIS — Z86.711 PERSONAL HISTORY OF PE (PULMONARY EMBOLISM): ICD-10-CM

## 2024-02-16 LAB
INR HOME MONITORING: 1.2 RATIO (ref 2–3)
LOCATION OF TASK: NORMAL
PROT PATTERN SERPL IFE-IMP: NORMAL

## 2024-02-16 NOTE — PROGRESS NOTES
"ANTICOAGULATION MANAGEMENT     Haresh Rock 73 year old male is on warfarin with subtherapeutic INR result. (Goal INR 2.0-3.0)    Recent labs: (last 7 days)     02/16/24  1700   INR 1.2*       ASSESSMENT     Warfarin Lab Questionnaire    Warfarin Doses Last 7 Days      2/14/2024     9:15 AM   Dose in Tablet or Mg   TAB or MG? milligram (mg)     Pt Rptd Dose SUNDAY MONDAY TUESDAY WED THURS FRIDAY SATURDAY 2/14/2024   9:15 AM 3.75 2.5 3.75 2.5 2.5 3.75 2.5         2/14/2024   Warfarin Lab Questionnaire   Missed doses within past 14 days? No   Changes in diet or alcohol within past 14 days? No   Medication changes since last result? No   Injuries or illness since last result? No   New shortness of breath, severe headaches or sudden changes in vision since last result? No   Abnormal bleeding since last result? No   Upcoming surgery, procedure? No   Best number to call with results? [phone]     Previous result: Therapeutic last visit; previously outside of goal range  Additional findings: Patient reports he tried testing his INR x3 today. Patient reports during his first and third reading, his meter read \"clot not detected\". Per [employer] user manual, possible causes include insufficient sample size, air bubble, or not allow sample to completely fill transfer tube. Second reading was 1.2. patient feels that this reading is not accurate.       PLAN     Recommended plan for temporary change(s) affecting INR     Dosing Instructions: booster dose then continue your current warfarin dose with next INR in 3 days       Summary  As of 2/16/2024      Full warfarin instructions:  2/16: 5 mg; Otherwise 3.75 mg every Sun, Tue, Fri; 2.5 mg all other days   Next INR check:  2/19/2024               Telephone call with Haresh who agrees to plan and repeated back plan correctly    Lab visit scheduled    Education provided:   Please call back if any changes to your diet, medications or how you've been taking warfarin  Symptom " monitoring: monitoring for bleeding signs and symptoms, monitoring for clotting signs and symptoms, monitoring for stroke signs and symptoms, and when to seek medical attention/emergency care  Contact [phone]  with any changes, questions or concerns.     Plan made with [facility] Pharmacist Xiomara Hyde RN  Anticoagulation Clinic  2/16/2024    _______________________________________________________________________     Anticoagulation Episode Summary       Current INR goal:  2.0-3.0   TTR:  85.6% (1 y)   Target end date:  Indefinite   Send INR reminders to:  [facility]IT[facility]    Indications    Long-term (current) use of anticoagulants [Z79.01] [Z79.01]  Atrial fibrillation (H) (Resolved) [I48.91]  Antiphospholipid antibody syndrome (H24) (Resolved) [D68.61]  Personal history of DVT (deep vein thrombosis) (Resolved) [Z86.718]  Atrial fibrillation  unspecified type (H) (Resolved) [I48.91]  Paroxysmal atrial fibrillation (H) [I48.0]  Chronic atrial fibrillation (H) (Resolved) [I48.20]  Personal history of PE (pulmonary embolism) [Z86.711]             Comments:  JESSICA rodas to manage by exception             Anticoagulation Care Providers       Provider Role Specialty Phone number    Anya Nowak MD Referring Family Medicine [phone]    Elizabeth Balderas MD Referring HOSPITALIST [phone]

## 2024-02-19 ENCOUNTER — LAB (OUTPATIENT)
Dept: LAB | Facility: CLINIC | Age: 74
End: 2024-02-19
Payer: COMMERCIAL

## 2024-02-19 ENCOUNTER — ANTICOAGULATION THERAPY VISIT (OUTPATIENT)
Dept: ANTICOAGULATION | Facility: CLINIC | Age: 74
End: 2024-02-19

## 2024-02-19 DIAGNOSIS — I48.0 PAROXYSMAL ATRIAL FIBRILLATION (H): ICD-10-CM

## 2024-02-19 DIAGNOSIS — Z86.711 PERSONAL HISTORY OF PE (PULMONARY EMBOLISM): ICD-10-CM

## 2024-02-19 DIAGNOSIS — Z79.01 LONG TERM CURRENT USE OF ANTICOAGULANT THERAPY: Primary | ICD-10-CM

## 2024-02-19 DIAGNOSIS — D68.61 ANTIPHOSPHOLIPID ANTIBODY SYNDROME (H): ICD-10-CM

## 2024-02-19 LAB — INR BLD: 3.3 (ref 0.9–1.1)

## 2024-02-19 PROCEDURE — 85610 PROTHROMBIN TIME: CPT

## 2024-02-19 PROCEDURE — 36416 COLLJ CAPILLARY BLOOD SPEC: CPT

## 2024-02-19 NOTE — PROGRESS NOTES
ANTICOAGULATION MANAGEMENT     Haresh Rock 73 year old male is on warfarin with supratherapeutic INR result. (Goal INR 2.0-3.0)    Recent labs: (last 7 days)     02/19/24  1455   INR 3.3*       ASSESSMENT     Source(s): Chart Review and Patient/Caregiver Call     Warfarin doses taken: Warfarin taken as instructed  Diet: No new diet changes identified  Medication/supplement changes: None noted  New illness, injury, or hospitalization: No  Signs or symptoms of bleeding or clotting: No  Previous result: Subtherapeutic  Additional findings: Rapid rise today. Pt thinks that reading on Friday was inaccurate due to difficulties with getting his home meter to work. He took advised booster does of warfarin  on 2/16/24.        PLAN     Recommended plan for temporary change(s) affecting INR     Dosing Instructions: Continue your current warfarin dose with next INR in 4 days       Summary  As of 2/19/2024      Full warfarin instructions:  3.75 mg every Sun, Tue, Fri; 2.5 mg all other days   Next INR check:  2/23/2024               Telephone call with Haresh who verbalizes understanding and agrees to plan    Patient to recheck with home meter    Education provided:   Goal range and lab monitoring: goal range and significance of current result and Importance of therapeutic range    Plan made with [facility] Pharmacist Xiomara Rico, RN  Anticoagulation Clinic  2/19/2024    _______________________________________________________________________     Anticoagulation Episode Summary       Current INR goal:  2.0-3.0   TTR:  85.2% (1 y)   Target end date:  Indefinite   Send INR reminders to:  ANT[facility]AG HOME MONITOR[facility]    Indications    Long-term (current) use of anticoagulants [Z79.01] [Z79.01]  Atrial fibrillation (H) (Resolved) [I48.91]  Antiphospholipid antibody syndrome (H24) (Resolved) [D68.61]  Personal history of DVT (deep vein thrombosis) (Resolved) [Z86.718]  Atrial fibrillation  unspecified type (H) (Resolved)  [I48.91]  Paroxysmal atrial fibrillation (H) [I48.0]  Chronic atrial fibrillation (H) (Resolved) [I48.20]  Personal history of PE (pulmonary embolism) [Z86.711]             Comments:  JESSICA rodas to manage by exception             Anticoagulation Care Providers       Provider Role Specialty Phone number    Anya Nowak MD Referring Family Medicine [phone]    Elizabeth Balderas MD Referring HOSPITALIST [phone]

## 2024-02-20 DIAGNOSIS — I50.21 ACUTE SYSTOLIC (CONGESTIVE) HEART FAILURE (H): ICD-10-CM

## 2024-02-20 RX ORDER — ROSUVASTATIN CALCIUM 40 MG/1
40 TABLET, COATED ORAL AT BEDTIME
Qty: 90 TABLET | Refills: 3 | Status: SHIPPED | OUTPATIENT
Start: 2024-02-20

## 2024-02-22 ENCOUNTER — THERAPY VISIT (OUTPATIENT)
Dept: SPEECH THERAPY | Facility: CLINIC | Age: 74
End: 2024-02-22
Payer: COMMERCIAL

## 2024-02-22 DIAGNOSIS — R49.8 HYPOPHONIA: Primary | ICD-10-CM

## 2024-02-22 DIAGNOSIS — F80.0 ARTICULATION DISORDER: ICD-10-CM

## 2024-02-22 DIAGNOSIS — G20.A1 PARKINSON'S DISEASE WITHOUT DYSKINESIA OR FLUCTUATING MANIFESTATIONS (H): ICD-10-CM

## 2024-02-22 DIAGNOSIS — R49.9 VOICE DISORDER: ICD-10-CM

## 2024-02-22 PROCEDURE — 92507 TX SP LANG VOICE COMM INDIV: CPT | Mod: GN

## 2024-02-23 LAB — INR HOME MONITORING: 2.3 RATIO (ref 2–3)

## 2024-02-26 ENCOUNTER — ANTICOAGULATION THERAPY VISIT (OUTPATIENT)
Dept: ANTICOAGULATION | Facility: CLINIC | Age: 74
End: 2024-02-26
Payer: COMMERCIAL

## 2024-02-26 DIAGNOSIS — Z79.01 LONG TERM CURRENT USE OF ANTICOAGULANT THERAPY: Primary | ICD-10-CM

## 2024-02-26 DIAGNOSIS — Z86.711 PERSONAL HISTORY OF PE (PULMONARY EMBOLISM): ICD-10-CM

## 2024-02-26 DIAGNOSIS — I48.0 PAROXYSMAL ATRIAL FIBRILLATION (H): ICD-10-CM

## 2024-02-26 NOTE — PROGRESS NOTES
ANTICOAGULATION MANAGEMENT     Haresh Rock 73 year old male is on warfarin with therapeutic INR result. (Goal INR 2.0-3.0)    Recent labs: (last 7 days)     02/23/24 2037   INR 2.3       ASSESSMENT     Source(s): Chart Review and Patient/Caregiver Call     Warfarin doses taken: Warfarin taken as instructed  Diet: No new diet changes identified  Medication/supplement changes: None noted  New illness, injury, or hospitalization: No  Signs or symptoms of bleeding or clotting: No  Previous result: Supratherapeutic  Additional findings: None       PLAN     Recommended plan for no diet, medication or health factor changes affecting INR     Dosing Instructions: Continue your current warfarin dose with next INR in 1 week       Summary  As of 2/26/2024      Full warfarin instructions:  3.75 mg every Sun, Tue, Fri; 2.5 mg all other days   Next INR check:  3/1/2024               Telephone call with Haresh who verbalizes understanding and agrees to plan    Patient to recheck with home meter    Education provided:   Please call back if any changes to your diet, medications or how you've been taking warfarin  Contact [phone]  with any changes, questions or concerns.     Plan made per [facility] anticoagulation protocol    Mayi Ayala, RN  Anticoagulation Clinic  2/26/2024    _______________________________________________________________________     Anticoagulation Episode Summary       Current INR goal:  2.0-3.0   TTR:  84.7% (1 y)   Target end date:  Indefinite   Send INR reminders to:  ANT[facility] HOME MONITORING    Indications    Long-term (current) use of anticoagulants [Z79.01] [Z79.01]  Atrial fibrillation (H) (Resolved) [I48.91]  Antiphospholipid antibody syndrome (H24) (Resolved) [D68.61]  Personal history of DVT (deep vein thrombosis) (Resolved) [Z86.718]  Atrial fibrillation  unspecified type (H) (Resolved) [I48.91]  Paroxysmal atrial fibrillation (H) [I48.0]  Chronic atrial fibrillation (H) (Resolved)  [I48.20]  Personal history of PE (pulmonary embolism) [Z86.711]             Comments:  JESSICA okay to manage by exception             Anticoagulation Care Providers       Provider Role Specialty Phone number    Anya Nowak MD Referring Family Medicine [phone]    Elizabeth Balderas MD Referring HOSPITALIST [phone]

## 2024-02-27 ENCOUNTER — THERAPY VISIT (OUTPATIENT)
Dept: SPEECH THERAPY | Facility: CLINIC | Age: 74
End: 2024-02-27
Payer: COMMERCIAL

## 2024-02-27 DIAGNOSIS — R49.9 VOICE DISORDER: ICD-10-CM

## 2024-02-27 DIAGNOSIS — R49.8 HYPOPHONIA: Primary | ICD-10-CM

## 2024-02-27 DIAGNOSIS — G20.A1 PARKINSON'S DISEASE WITHOUT DYSKINESIA OR FLUCTUATING MANIFESTATIONS (H): ICD-10-CM

## 2024-02-27 DIAGNOSIS — F80.0 ARTICULATION DISORDER: ICD-10-CM

## 2024-02-27 PROCEDURE — 92507 TX SP LANG VOICE COMM INDIV: CPT | Mod: GN

## 2024-02-27 NOTE — PROGRESS NOTES
[facility]     OUTPATIENT SPEECH LANGUAGE PATHOLOGY RECERTIFICATION/PROGRESS NOTE.     [facility]                                                                                   OUTPATIENT SPEECH LANGUAGE PATHOLOGY    PLAN OF TREATMENT FOR OUTPATIENT REHABILITATION   Patient's Last Name, First Name, Haresh Valentin YOB: 1950   Provider's Name   [facility]   Medical Record No.  [id number]     Onset Date: 11/16/23 (order date) Start of Care Date: 12/07/23     Medical Diagnosis:  Parkinson's disease, Articulation disorder, Voice disorder      SLP Treatment Diagnosis: Hypophonia  Plan of Treatment  Frequency/Duration: 2x/week  / 60 days     Certification date from 03/06/24   To 05/05/24          Beginning/End Dates of Progress Note Reporting Period:  12/07/24  to 02/27/2024 (pn END DATE 03/06/24, NEXT DATE OF SESSION 03/07/24)      02/27/24 0500   Appointment Info   Treating Provider Janice Roger MS, CCC-SLP   Visits Used 8   Medical Diagnosis Parkinson's disease, Articulation disorder, Voice disorder   SLP Tx Diagnosis Hypophonia   Quick Adds Certification   Progress Note/Certification   Start Of Care Date 12/07/23   Onset Of Illness/injury Or Date Of Surgery 11/16/23  (order date)   Therapy Frequency 2x/week   Predicted Duration 60 days   Certification date from 03/06/24   Certification date to 05/05/24   Progress Note Due Date 05/05/24   Progress Note Completed Date 02/27/24   Subjective Report   Subjective Report Haresh arrived on time to intervention and attended IND. Brought in his EMST. Endorsed he doesn't always complete HEP. Endorsed, ' . . . maybe 3 times a week'   SLP Goals   SLP Goals 1;2   SLP Goal 1   Goal Identifier Breath   Goal Description Patient will increase vocal loudness to an average SPL of >70 dB at 30 cm during sustained /a/ phonation of at least 10 seconds given min cues in  order to increase vocal respiratory support required for functional communication.   Goal Progress GOAL PROGRESSING/GOAL NOT MET. Across the last 3 consecutive sessions targeting goal. EMST utilized before start of of LSVT amplitude based voice exercises - Pt demonstrated 5-10 repetitions of LSVT trained amplitude-based voice exercises given min to mod cues/models from SLP to maintain target volume and to take deep diaphragmatic breaths prior to each repetition.Pt completed the maximum sustained /a/ with an average of 70.5 dB and average of 7.51 seconds duration. Average: maximum fundamental frequency range tasks (high/low) with 5 repetitions each (10 in all) in the rage of 174-212.5 hz or f3-g3 while maintaining volume >70 dB. Goal remains functional. Cont as written.   Target Date 03/06/24   SLP Goal 2   Goal Identifier Loudness   Goal Description Patient will increase vocal loudness during narrative reading and semi-spontaneous conversation tasks to a minimum range of 68-73 dB at 30 cm given min cues in order to improve vocal loudness for daily communication tasks.   Goal Progress GOAL PROGRESSING/GOAL NOT MET. Across the last 3 consecutive sessions targeting goal. EMST utilized before start of of LSVT amplitude based voice exercises. On average, Pt read aloud 10 functional phrases/sentences x 3 with an average 72.8 dB given min-mod cues from SLP to maintain target volume. Paragraphs/extended reading with an average of 71.7 db.  Off the cuff questions  based on activities of what he is doing for the rest of day: 68.8 Min-mod cues throughout. Goal remains functional. Cont as written.   Target Date 03/06/24   Treatment Interventions (SLP)   Treatment Interventions Treatment Speech/Lang/Voice   Treatment Speech/Lang/Voice   Treatment of Speech, Language, Voice Communication&/or Auditory Processing (45160) 38 Minutes   Speech/Lang/Voice Speech/Lang/Voice 2   Speech/Lang/Voice 1 Breath   Speech/Lang/Voice 1 - Details  Breath: Pt demonstrated 5 repetitions of LSVT trained amplitude-based voice exercises given min to mod cues/models from SLP to maintain target volume and to take deep diaphragmatic breaths prior to each repetition.Pt completed the maximum sustained /a/ with an average of 71.8 dB and 8.1 seconds duration. Completed 15+ EMST given min cues or less   Speech/Lang/Voice 2 Loudness   Speech/Lang/Voice 2 - Details Pt read aloud 10 functional phrases/sentences x 3  with an average 74 dB given min-mod cues from SLP to maintain target volume.  Paragraphs/extended reading with an average of 73 db.   Off the cuff questions  based on activities of what he is doing for the rest of day: 71.2 Min-mod cues throughout   Skilled Intervention Provided written and verbal information on.;Educated patient on vocal health strategies.;Educated patient on risks.;Instructed/trained/cued larynx function tasks;Instructed/trained/cued monitored LSVT and hierarchy tasks;Demonstrated voice exercises;Modeled compensatory strategies;Provided feedback on performance of tasks;Facilitated respiratory, laryngeal, oral integration;Measured key voice production parameters  (calibration of EMST, and need for increase breath support)   Patient Response/Progress Reduced progress suspect d/t pt not wanting to be seen more than x1 a week, some carry-over at home, however limited. Possible increase in aspiration and/or dysphagia, pt declined swallow evaluation reflecting in progress. Skilled education re: need for HEP and consistency to aid in generalization of voice loudness/intelligibility. Haresh stated comprehension.   Education   Learner/Method Patient;Listening;No Barriers to Learning   Education Comments Session targets, performance, rationale behind therapy exercises, HEP upon discharge   Plan   Home program daily - sustained /ah/ 10 sets twice a day record, high/low /ah/, functional phrases, extended reading   Updates to plan of care x 2 more sessions    Plan for next session cont sustain /ah/, add high/low /ah/, off the cuff questions, extended reading   Comments   Comments cert to complete final x2 recommended/scheduled sessions   Total Session Time   Total Treatment Time (sum of timed and untimed services) 38       Progress Toward Goals:   Progress this reporting period: Haresh has made appreciable progress this reporting period, as described above. Haresh continues to present with hypophonia, which negatively impacts his ability to express and be understood. Continued skilled intervention is recommended to further assist Haresh for more effective and efficient communication.     Client Self (Subjective) Report for Progress Note Reporting Period: Haresh is an 73-year-old male being seen for outpatient speech-language intervention. Haresh is seen at a frequency of twice weekly. He attended 8 sessions this reporting period (recommendation was 16). Haresh presented with good participation during sessions. Somewhat limited completion of HEP per pt endorsement. Inconsistent completion of EMST and LSVT voice amplitude intervention could play role in progress despite pt stating comprehension on multiple occurrences of need for twice daily exercises.      Objective Measurements: Data collected during treatment sessions and summarized in each goal as listed above.     PLAN  Continue therapy per current plan of care.    Referring Provider:  Ángel Hill        Thank you for referring Haresh Rock to outpatient speech therapy at [facility].  Please call [phone] or email  Amber Roger MS, CCC-SLP at [email or web address] with any questions or concerns.        Amber Roger MS, CCC-SLP    Speech-Language Pathologist

## 2024-02-29 ENCOUNTER — THERAPY VISIT (OUTPATIENT)
Dept: OCCUPATIONAL THERAPY | Facility: CLINIC | Age: 74
End: 2024-02-29
Payer: COMMERCIAL

## 2024-02-29 DIAGNOSIS — R27.8 IMPAIRED COORDINATION OF UPPER EXTREMITY: ICD-10-CM

## 2024-02-29 DIAGNOSIS — G20.A1 PARKINSON'S DISEASE WITHOUT DYSKINESIA, UNSPECIFIED WHETHER MANIFESTATIONS FLUCTUATE (H): Primary | ICD-10-CM

## 2024-02-29 PROCEDURE — 97535 SELF CARE MNGMENT TRAINING: CPT | Mod: GO | Performed by: OCCUPATIONAL THERAPIST

## 2024-02-29 PROCEDURE — 97530 THERAPEUTIC ACTIVITIES: CPT | Mod: GO | Performed by: OCCUPATIONAL THERAPIST

## 2024-03-01 ENCOUNTER — DOCUMENTATION ONLY (OUTPATIENT)
Dept: ANTICOAGULATION | Facility: CLINIC | Age: 74
End: 2024-03-01
Payer: COMMERCIAL

## 2024-03-01 DIAGNOSIS — Z86.711 PERSONAL HISTORY OF PE (PULMONARY EMBOLISM): ICD-10-CM

## 2024-03-01 DIAGNOSIS — I48.0 PAROXYSMAL ATRIAL FIBRILLATION (H): ICD-10-CM

## 2024-03-01 DIAGNOSIS — Z79.01 LONG TERM CURRENT USE OF ANTICOAGULANT THERAPY: Primary | ICD-10-CM

## 2024-03-01 LAB — INR HOME MONITORING: 2.2 RATIO (ref 2–3)

## 2024-03-01 NOTE — PROGRESS NOTES
ANTICOAGULATION  MANAGEMENT-Home Monitor Managed by Exception    Haresh EMILIE Rock 73 year old male is on warfarin with therapeutic INR result. (Goal INR 2.0-3.0)    Recent labs: (last 7 days)     03/01/24  1232   INR 2.2       Previous INR was Therapeutic  Medication, diet, health changes since last INR:chart reviewed; none identified  Contacted within the last 12 weeks by phone on 2/23/24  Last [facility] referral date: 07/03/2023      MINNIE Hutson was NOT contacted regarding therapeutic result today per home monitoring policy manage by exception agreement.   Current warfarin dose is to be continued:     Summary  As of 3/1/2024      Full warfarin instructions:  3.75 mg every Sun, Tue, Fri; 2.5 mg all other days   Next INR check:  3/8/2024             ?   Mayi Ayala RN  Anticoagulation Clinic  3/1/2024    _______________________________________________________________________     Anticoagulation Episode Summary       Current INR goal:  2.0-3.0   TTR:  84.8% (1 y)   Target end date:  Indefinite   Send INR reminders to:  [facility]    Indications    Long-term (current) use of anticoagulants [Z79.01] [Z79.01]  Atrial fibrillation (H) (Resolved) [I48.91]  Antiphospholipid antibody syndrome (H24) (Resolved) [D68.61]  Personal history of DVT (deep vein thrombosis) (Resolved) [Z86.718]  Atrial fibrillation  unspecified type (H) (Resolved) [I48.91]  Paroxysmal atrial fibrillation (H) [I48.0]  Chronic atrial fibrillation (H) (Resolved) [I48.20]  Personal history of PE (pulmonary embolism) [Z86.711]             Comments:  JESSICA rodas to manage by exception             Anticoagulation Care Providers       Provider Role Specialty Phone number    Anya Nowak MD Referring Family Medicine [phone]    Elizabeth Balderas MD Referring HOSPITALIST [phone]

## 2024-03-06 ENCOUNTER — THERAPY VISIT (OUTPATIENT)
Dept: OCCUPATIONAL THERAPY | Facility: CLINIC | Age: 74
End: 2024-03-06
Payer: COMMERCIAL

## 2024-03-06 ENCOUNTER — THERAPY VISIT (OUTPATIENT)
Dept: PHYSICAL THERAPY | Facility: CLINIC | Age: 74
End: 2024-03-06
Payer: COMMERCIAL

## 2024-03-06 DIAGNOSIS — R26.89 IMPAIRED GAIT AND MOBILITY: ICD-10-CM

## 2024-03-06 DIAGNOSIS — G20.A1 PARKINSON'S DISEASE WITHOUT DYSKINESIA, UNSPECIFIED WHETHER MANIFESTATIONS FLUCTUATE (H): Primary | ICD-10-CM

## 2024-03-06 DIAGNOSIS — R27.8 IMPAIRED COORDINATION OF UPPER EXTREMITY: ICD-10-CM

## 2024-03-06 PROCEDURE — 97110 THERAPEUTIC EXERCISES: CPT | Mod: 59 | Performed by: OCCUPATIONAL THERAPIST

## 2024-03-06 PROCEDURE — 97530 THERAPEUTIC ACTIVITIES: CPT | Mod: GO | Performed by: OCCUPATIONAL THERAPIST

## 2024-03-06 PROCEDURE — 97110 THERAPEUTIC EXERCISES: CPT | Mod: GP

## 2024-03-06 PROCEDURE — 97112 NEUROMUSCULAR REEDUCATION: CPT | Mod: GP

## 2024-03-06 NOTE — PROGRESS NOTES
03/06/24 0500   Appointment Info   Signing clinician's name / credentials Blanca Beltran, PT, DPT   Visits Used 7   Medical Diagnosis Parkinson's Disease, polyneuropathy   PT Tx Diagnosis impaired balance and gait   Progress Note/Certification   Start of Care Date 12/08/23   Onset of illness/injury or Date of Surgery 11/15/23   Therapy Frequency 1x/week  (would benefit from greater frequency but patient declined)   Predicted Duration 90 days   Certification date from 03/07/24   Certification date to 06/05/24   Progress Note Completed Date 01/29/24   PT Goal 1   Goal Identifier MET: 5xSTS   Goal Description PT will perform 5xSTS in 16 sec or less due to improvements in functional strength and mobility   Goal Progress eval: 20.62, 1/29 improved to 12.37 seconds, 3/6 13.75 sec   Target Date 03/07/24   Date Met 01/29/24   PT Goal 2   Goal Identifier gait speed   Goal Description Pt will improve gait speed to >1.10 m/s   Rationale to maximize safety and independence within the community   Goal Progress eval: 0.94 m/s; 1/29: 1.02 m/s   Target Date 06/05/24   PT Goal 3   Goal Identifier miniBEST   Goal Description Pt will score 22 or greater on miniBEST due to improvements in static and dynamic balance   Rationale to maximize safety and independence with performance of ADLs and functional tasks   Goal Progress eval: 18/28, 129: declined to 15/28   Target Date 06/05/24   PT Goal 4   Goal Identifier HEP   Goal Description Pt will demonstrate independence with HEP in order to improve self management of symptoms   Goal Progress 1/29: working on exercises requrily   Target Date 06/05/24   Subjective Report   Subjective Report Feels like balance has become worse since last year in clinic. Up to 10 minutes a week ago. Has down the TM a couple times. Doing some exercises but feels like he has gone down a little last in clinic.   Treatment Interventions (PT)   Interventions Therapeutic Procedure/Exercise;Neuromuscular  "Re-education   Therapeutic Procedure/Exercise   Therapeutic Procedures: strength, endurance, ROM, flexibillity minutes (79263) 35   Therapeutic Procedures Ther Proc 2   Ther Proc 1 Aerobic Exericse   Ther Proc 1 - Details TM walking for warm up and neural priming with verbal and tacile cuing for posture and step length (difficullty maintaining both at same time) at 1.8 mph for 5 minutes, decreased to 1.5 mph, ended at 10 minutes. HR 80 following.   Skilled Intervention LE strengthening and endurance training, HEP review with cuing and instruction, vital monitoring   Patient Response/Progress slight decline in strength and cuing needed for good mechanics with HEP, able to progress UE strengthening   PTRx Ther Proc 1 Supine Lumbar Hip Roll   PTRx Ther Proc 1 - Details Reviewed with arms in  \"T\" position with minimal neck motion x6 rep   PTRx Ther Proc 2 Bridging #1   PTRx Ther Proc 2 - Details x10 rep some neck pain with this   PTRx Ther Proc 3 Sit to Stand   PTRx Ther Proc 3 - Details Completed 5xSTS with slight decline compared to last in clinic but remained significantly improved. Completed x10 rep with focus on power of movement   PTRx Ther Proc 4 Shoulder Theraband Low Row/Pulldown   PTRx Ther Proc 4 - Details Completed x20 rep from door handle height with GTB   PTRx Ther Proc 5 Education Sheet General   PTRx Ther Proc 5 - Details Review of TM training with goal of 4-5x/week at 10 minutes   PTRx Ther Proc 6 Wall Push Up Plus   PTRx Ther Proc 6 - Details Completed x10 rep progressed to counter height   PTRx Ther Proc 7 Standing Push Up at Countertop   PTRx Ther Proc 7 - Details x15 rep from [facility]   Neuromuscular Re-education   Neuromuscular re-ed of mvmt, balance, coord, kinesthetic sense, posture, proprioception minutes (11607) 10   Neuro Re-ed 1 PD: Forward Weight Shift with Legs and Arms   Neuro Re-ed 1 - Details Single UE support and staggered stance with ant/post weight shift with focus on posture and " amplitude of weightshift. Use of cuing, demo, and molding to assist with improvd amplitude of movement. Trial to add arm swing with worsening posture. Carryover to walking with arm swing and large step length x150 feet.   Skilled Intervention weightshiting and stepping amplitude training, cuing, guarding as needed   Patient Response/Progress noted improvment in step ampltidue in session, difficulty maintaining good axial extension with wt shifting   PTRx Neuro Re-ed 1 Sidestep   PTRx Neuro Re-ed 1 - Details Reviewed without band as patient was completing at home 2x10 feet/direction. Modified to balance exercise with focus on step amplitude with added hurdles (4) completed x5 bouts/direction. Removed hurdles and continued 3x10 feet/direction with significant improvement in step height.    Eval/Assessments   Assessments Physical Performance Test/Measures   Plan   Updates to plan of care recommended schedule 4-6 more visits, recommended 2x/week but patient preference to 1x/week   Plan for next session continue walking/aeobic/neural priming, rock and reach, posterior stepping, turns   Total Session Time   Timed Code Treatment Minutes 45   Total Treatment Time (sum of timed and untimed services) 45       [facility]                                                                                   OUTPATIENT PHYSICAL THERAPY    PLAN OF TREATMENT FOR OUTPATIENT REHABILITATION   Patient's Last Name, First Name, Haresh Valentin YOB: 1950   Provider's Name   [facility]   Medical Record No.  [id number]     Onset Date: 11/15/23  Start of Care Date: 12/08/23     Medical Diagnosis:  Parkinson's Disease, polyneuropathy      PT Treatment Diagnosis:  impaired balance and gait Plan of Treatment  Frequency/Duration: 1x/week (would benefit from greater frequency but patient declined)/ 90 days    Certification date from 03/07/24 to 06/05/24         See  note for plan of treatment details and functional goals     Blanca Beltran, PT                         I CERTIFY THE NEED FOR THESE SERVICES FURNISHED UNDER        THIS PLAN OF TREATMENT AND WHILE UNDER MY CARE     (Physician attestation of this document indicates review and certification of the therapy plan).              Referring Provider:  Ángel Hill    Initial Assessment  See Epic Evaluation- Start of Care Date: 12/08/23

## 2024-03-07 ENCOUNTER — THERAPY VISIT (OUTPATIENT)
Dept: SPEECH THERAPY | Facility: CLINIC | Age: 74
End: 2024-03-07
Payer: COMMERCIAL

## 2024-03-07 DIAGNOSIS — G20.A1 PARKINSON'S DISEASE WITHOUT DYSKINESIA OR FLUCTUATING MANIFESTATIONS (H): ICD-10-CM

## 2024-03-07 DIAGNOSIS — F80.0 ARTICULATION DISORDER: ICD-10-CM

## 2024-03-07 DIAGNOSIS — R49.8 HYPOPHONIA: Primary | ICD-10-CM

## 2024-03-07 DIAGNOSIS — R49.9 VOICE DISORDER: ICD-10-CM

## 2024-03-07 PROCEDURE — 92507 TX SP LANG VOICE COMM INDIV: CPT | Mod: GN

## 2024-03-08 ENCOUNTER — DOCUMENTATION ONLY (OUTPATIENT)
Dept: ANTICOAGULATION | Facility: CLINIC | Age: 74
End: 2024-03-08
Payer: COMMERCIAL

## 2024-03-08 ENCOUNTER — PATIENT OUTREACH (OUTPATIENT)
Dept: CARE COORDINATION | Facility: CLINIC | Age: 74
End: 2024-03-08
Payer: COMMERCIAL

## 2024-03-08 DIAGNOSIS — I48.0 PAROXYSMAL ATRIAL FIBRILLATION (H): ICD-10-CM

## 2024-03-08 DIAGNOSIS — Z79.01 LONG TERM CURRENT USE OF ANTICOAGULANT THERAPY: Primary | ICD-10-CM

## 2024-03-08 DIAGNOSIS — Z86.711 PERSONAL HISTORY OF PE (PULMONARY EMBOLISM): ICD-10-CM

## 2024-03-08 LAB — INR HOME MONITORING: 2.3 RATIO (ref 2–3)

## 2024-03-08 NOTE — LETTER
[facility]  Patient Centered Plan of Care  About Me:        Patient Name:  Haresh Granados    YOB: 1950  Age:         73 year old   Radha MRN:    [id number] Telephone Information:  Home Phone [phone]   Mobile [phone]       Address:  [address] [place] [address] Email address:  [email or web address]      Emergency Contact(s)    Name Relationship Lgl Grd Work Phone Home Phone Mobile Phone   1. JANICE GRANADOS Spouse No   [phone]   2. DARWIN,LAY Daughter No   [phone]   3. SOL GRANADOS Son No   [phone]           Primary language:  English     needed? No   [employer]:  [phone] op. 1  Other communication barriers:Glasses; Language barrier    Preferred Method of Communication:  Mail  Current living arrangement: I live in a private home with family    Mobility Status/ Medical Equipment: Independent        Health Maintenance  Health Maintenance Reviewed: Due/Overdue Health Maintenance reviewed -   Health Maintenance Due   Topic Date Due    RSV VACCINE (Pregnancy & 60+) (1 - 1-dose 60+ series) Never done    ZOSTER IMMUNIZATION (1 of 2) 11/14/2016    IPV IMMUNIZATION (3 of 3 - Adult catch-up series) 03/19/2017    DTAP/TDAP/TD IMMUNIZATION (2 - Td or Tdap) 09/11/2022    DIABETIC FOOT EXAM  10/11/2023    EYE EXAM  02/13/2024           My Access Plan  Medical Emergency 911   Primary Clinic Line [facility] - [phone]   24 Hour Appointment Line [phone] or  [phone]) (toll-free)   24 Hour Nurse Line [phone] (toll-free)   Preferred Urgent Care No data recorded   Preferred Hospital [facility]  [phone]     Preferred Pharmacy [id number] - [place] - [address] [facility]     Behavioral Health Crisis Line The National Suicide Prevention Lifeline at 1-639.732.4743 or Text/Call 608           My Care Team Members  Patient Care Team          Relationship Specialty Notifications Start End    Anya Nowak MD PCP - General Family Practice  4/23/19     Phone: [phone] Fax: [phone] [address] [facility] [place] [facility] [address]    Augusto Miller MD MD Hematology Admissions 9/30/11     Referred to Endocrinology    Phone: [phone] Fax: [phone]         [address] [facility] [address]    Jesusita Mejia PA-C Physician Assistant   3/21/12     Phone: [phone] Fax: [phone]         [address] [facility] [address] [facility] [address]    Pino Sharma MD MD Internal Medicine  4/11/16     Phone: [phone] Fax: [phone]         [address] [facility] [address] [facility] [address]    Fabi Jimenez MD MD INTERNAL MEDICINE - ENDOCRINOLOGY, DIABETES & METABOLISM  4/18/16     Phone: [phone] Fax: [phone]         [address] [facility] [address] [facility] [address]    Bekah Matt MD MD Cardiology  8/12/16     Phone: [phone] Fax: [phone] [address] [facility] [facility] [address]    Tina Mejia, RN Specialty Care Coordinator Cardiology  3/6/19     Phone: [phone] Fax: [phone]         [address] [facility] [address]    Bekah Matt MD Assigned Heart and Vascular Provider   10/23/20     Phone: [phone] Fax: [phone] [address] [facility]. [place] [facility] [address]    Ángel Hill MD Assigned Neuroscience Provider   11/15/20     Phone: [phone] Fax: [phone]         [address] [facility] [address]    Rosalva Samuels MD MD Family Medicine  6/1/21     Phone: [phone] Fax: [phone] [address] [facility] [address]    Bekah Matt MD MD Clinical Cardiac Electrophysiology  9/10/21     Phone: [phone] Fax: [phone]         [facility] [place] [address]    Uriel Van, RN Lead Care Coordinator Primary Care - CC Admissions 11/5/21     Phone: [phone] Fax: [phone]        Lizbeth Chen, [facility]  Pharmacist Pharmacist Ambulatory Care  11/12/21     Phone: [phone] [address] [place] [address]    Leslie Clifford, [facility] Pharmacist Pharmacist  12/13/21     Phone: [phone] Pager: [phone] Fax: [phone]        [address] [place] [address]    Bonnie Walls RN Specialty Care Coordinator Hematology & Oncology Admissions 12/16/21     Phone: [phone] Pager: [phone]        Maye Rain CHW Community Health Worker Primary Care - CC Admissions 4/18/22     Phone: [phone] Fax: [phone]        Reg Cantu MD MD Critical Care  6/9/22     Phone: [phone] Fax: [phone]         [address] [place] [address]    Augusto Miller MD Assigned Cancer Care Provider   7/1/23     Phone: [phone] Fax: [phone]         [address] [place] [address]    Lizbeth Chen, [facility] Assigned MTM Pharmacist   7/22/23     Phone: [phone] [address] [place] [address]    Anya Nowak MD Assigned PCP   12/9/23     Phone: [phone] Fax: [phone]         [address] [facility] [place] [address]    Yuval Hampton DPM Assigned Surgical Provider   1/25/24     Phone: [phone] Fax: [phone]         [address] [facility] [address] [facility] [address]                My Care Plans  Self Management and Treatment Plan    Care Plan  Care Plan: General  Work on increasing strength and stamina for a year after reaching 100%       Problem: HP GENERAL PROBLEM       Goal: General  work on walking without the walker, and increasing my strength and stamina.  For at least 1 year after reaching 100%.       Start Date: 12/13/2021 Expected End Date: 6/13/2024    This Visit's Progress: 40% Recent Progress: 30%    Note:     Barriers: suffers from parkinson's  Strengths: engaged in care coordination  Patient expressed understanding of goal: yes  Action steps to achieve this goal:  1. I will continue  walking without the walker. No longer using the walker as of 3/15/22. Completed action step  2. I will go outside without the walker when I feel strong enough and have increased my strength and stamina.  Continue to work on for at least a year.  3. I will frequently walk around inside the house to increase my strength and stamina.  Continue to work on for at least a year.                            Care Plan: General - Working on balance and items in the path,       Problem: HP GENERAL PROBLEM       Goal: General Goal - work on balance and avoiding items in the path       Start Date: 10/28/2022 Expected End Date: 10/28/2024    This Visit's Progress: 30% Recent Progress: 20%    Note:     Barriers: parkinson's  Strengths: engaged in care coordination  Patient expressed understanding of goal: yes   Action steps to achieve this goal:  1. I will work on walking around items in my path without losing my balance.  2. I will work on maintaining my balance when I walk through the house.  3. I will use my walker or cane as needed to maintain my balance.                                Action Plans on File:                       Advance Care Plans/Directives:   Advanced Care Plan/Directives on file:   Yes    Status of Document(s): No data recorded  Advanced Care Plan/Directives Type:   Advanced Directive - On File           My Medical and Care Information  Problem List   Patient Active Problem List   Diagnosis    Wzgjd-yggepd-aukd disease, chronic (H)    Polyneuropathy    History of bilateral knee replacement    Bone marrow transplant status (H)    Hyperlipidemia LDL goal <70    Chronic rhinitis    Gallstones without obstruction of gallbladder    Long-term (current) use of anticoagulants [Z79.01]    Type 2 diabetes mellitus with stage 2 chronic kidney disease, without long-term current use of insulin (H)    History of Hodgkin's lymphoma    History of irradiation, presenting hazards to health    Hereditary hemochromatosis (H24)     Oropharyngeal dysphagia    Personal history of PE (pulmonary embolism)    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    Care Coordination Start Date: 11/5/2021   Frequency of Care Coordination: monthly, more frequently as needed     Form Last Updated: 03/08/2024

## 2024-03-08 NOTE — PROGRESS NOTES
Clinic Care Coordination Contact  Care Coordination Clinician Chart Review    Situation: Patient chart reviewed by Care Coordinator.       Background: Care Coordination Program started: 11/5/2021. Initial assessment completed and patient-centered care plan(s) were developed with participation from patient. Lead CC handed patient off to CHW for continued outreaches.       Assessment: Per chart review, patient outreach completed by CC CHW on 2/13/24.  Patient is actively working to accomplish goal(s). Patient's goal(s) appropriate and relevant at this time. Patient is due for updated Plan of Care.  Assessments will be completed annually or as needed/with change of patient status.      Care Plan: General  Work on increasing strength and stamina for a year after reaching 100%       Problem: HP GENERAL PROBLEM       Goal: General  work on walking without the walker, and increasing my strength and stamina.  For at least 1 year after reaching 100%.       Start Date: 12/13/2021 Expected End Date: 6/13/2024    This Visit's Progress: 40% Recent Progress: 30%    Note:     Barriers: suffers from parkinson's  Strengths: engaged in care coordination  Patient expressed understanding of goal: yes  Action steps to achieve this goal:  1. I will continue walking without the walker. No longer using the walker as of 3/15/22. Completed action step  2. I will go outside without the walker when I feel strong enough and have increased my strength and stamina.  Continue to work on for at least a year.  3. I will frequently walk around inside the house to increase my strength and stamina.  Continue to work on for at least a year.                            Care Plan: General - Working on balance and items in the path,       Problem: HP GENERAL PROBLEM       Goal: General Goal - work on balance and avoiding items in the path       Start Date: 10/28/2022 Expected End Date: 10/28/2024    This Visit's Progress: 30% Recent Progress: 20%    Note:      Barriers: parkinson's  Strengths: engaged in care coordination  Patient expressed understanding of goal: yes   Action steps to achieve this goal:  1. I will work on walking around items in my path without losing my balance.  2. I will work on maintaining my balance when I walk through the house.  3. I will use my walker or cane as needed to maintain my balance.                                   Plan/Recommendations: The patient will continue working with Care Coordination to achieve goal(s) as above. CHW will continue outreaches at minimum every 30 days and will involve Lead CC as needed or if patient is ready to move to Maintenance. Lead CC will continue to monitor CHW outreaches and patient's progress to goal(s) every 6 weeks.     Plan of Care updated and sent to patient: Yes, via [employer].            Uriel Zhou MSN, RN, PHN, CCM   Primary Care Clinical RN Care Coordinator  [facility]  3/8/2024   2:37 PM  [email or web address]  Office: [phone]

## 2024-03-08 NOTE — PROGRESS NOTES
ANTICOAGULATION  MANAGEMENT-Home Monitor Managed by Exception    Haresh PHAN Pranav 73 year old male is on warfarin with therapeutic INR result. (Goal INR 2.0-3.0)    Recent labs: (last 7 days)     03/08/24  1620   INR 2.3       Previous INR was Therapeutic  Medication, diet, health changes since last INR:chart reviewed; none identified  Contacted within the last 12 weeks by phone on 2/23/24 (~ 12 weeks: 5/17/24)  Last [facility] referral date: 07/03/202)3      MINNIE Hutson was NOT contacted regarding therapeutic result today per home monitoring policy manage by exception agreement.   Current warfarin dose is to be continued:     Summary  As of 3/8/2024      Full warfarin instructions:  3.75 mg every Sun, Tue, Fri; 2.5 mg all other days   Next INR check:  3/15/2024             ?   Milvia Martinez RN  Anticoagulation Clinic  3/8/2024    _______________________________________________________________________     Anticoagulation Episode Summary       Current INR goal:  2.0-3.0   TTR:  84.8% (1 y)   Target end date:  Indefinite   Send INR reminders to:  [facility]    Indications    Long-term (current) use of anticoagulants [Z79.01] [Z79.01]  Atrial fibrillation (H) (Resolved) [I48.91]  Antiphospholipid antibody syndrome (H24) (Resolved) [D68.61]  Personal history of DVT (deep vein thrombosis) (Resolved) [Z86.718]  Atrial fibrillation  unspecified type (H) (Resolved) [I48.91]  Paroxysmal atrial fibrillation (H) [I48.0]  Chronic atrial fibrillation (H) (Resolved) [I48.20]  Personal history of PE (pulmonary embolism) [Z86.711]             Comments:  JESSICA rodas to manage by exception             Anticoagulation Care Providers       Provider Role Specialty Phone number    Anya Nowak MD Referring Family Medicine [phone]    Elizabeth Balderas MD Referring HOSPITALIST [phone]

## 2024-03-12 ENCOUNTER — THERAPY VISIT (OUTPATIENT)
Dept: OCCUPATIONAL THERAPY | Facility: CLINIC | Age: 74
End: 2024-03-12
Payer: COMMERCIAL

## 2024-03-12 ENCOUNTER — THERAPY VISIT (OUTPATIENT)
Dept: PHYSICAL THERAPY | Facility: CLINIC | Age: 74
End: 2024-03-12
Payer: COMMERCIAL

## 2024-03-12 VITALS — SYSTOLIC BLOOD PRESSURE: 98 MMHG | DIASTOLIC BLOOD PRESSURE: 74 MMHG

## 2024-03-12 DIAGNOSIS — G20.A1 PARKINSON'S DISEASE WITHOUT DYSKINESIA, UNSPECIFIED WHETHER MANIFESTATIONS FLUCTUATE (H): Primary | ICD-10-CM

## 2024-03-12 DIAGNOSIS — R27.8 IMPAIRED COORDINATION OF UPPER EXTREMITY: ICD-10-CM

## 2024-03-12 DIAGNOSIS — R26.89 IMPAIRED GAIT AND MOBILITY: ICD-10-CM

## 2024-03-12 PROCEDURE — 97112 NEUROMUSCULAR REEDUCATION: CPT | Mod: GP

## 2024-03-12 PROCEDURE — 97110 THERAPEUTIC EXERCISES: CPT | Mod: GP

## 2024-03-12 PROCEDURE — 97530 THERAPEUTIC ACTIVITIES: CPT | Mod: GO | Performed by: OCCUPATIONAL THERAPIST

## 2024-03-14 ENCOUNTER — THERAPY VISIT (OUTPATIENT)
Dept: SPEECH THERAPY | Facility: CLINIC | Age: 74
End: 2024-03-14
Payer: COMMERCIAL

## 2024-03-14 DIAGNOSIS — R49.9 VOICE DISORDER: ICD-10-CM

## 2024-03-14 DIAGNOSIS — F80.0 ARTICULATION DISORDER: ICD-10-CM

## 2024-03-14 DIAGNOSIS — R49.8 HYPOPHONIA: Primary | ICD-10-CM

## 2024-03-14 DIAGNOSIS — G20.A1 PARKINSON'S DISEASE WITHOUT DYSKINESIA OR FLUCTUATING MANIFESTATIONS (H): ICD-10-CM

## 2024-03-14 PROCEDURE — 92507 TX SP LANG VOICE COMM INDIV: CPT | Mod: GN

## 2024-03-14 NOTE — PROGRESS NOTES
[facility]        OUTPATIENT SPEECH LANGUAGE PATHOLOGY DISCHARGE NOTE     Beginning/End Dates of Progress Note Reporting Period:  02/27/24  to 03/14/2024 03/14/24 0500   Appointment Info   Treating Provider Janice Roger MS, CCC-SLP; TALAT Tucker - SLP student   Visits Used 10   Medical Diagnosis Parkinson's disease, Articulation disorder, Voice disorder   SLP Tx Diagnosis Hypophonia   Quick Adds Certification;Student Supervision   Progress Note/Certification   Start Of Care Date 12/07/23   Onset Of Illness/injury Or Date Of Surgery 11/16/23  (order date)   Therapy Frequency 2x/week   Predicted Duration 60 days   Certification date from 03/06/24   Certification date to 05/05/24   Progress Note Due Date 05/05/24   Progress Note Completed Date 02/27/24   Supervision   Student Supervision Therapy services provided with the co-signing licensed therapist guiding and directing the services, and providing the skilled judgement and assessment throughout the session   Subjective Report   Subjective Report Haresh arrived on time to intervention and attended IND. Brought in his EMST. Endorsed he completes HEP mostly every day.   SLP Goals   SLP Goals 1;2   SLP Goal 1   Goal Identifier Breath   Goal Description Patient will increase vocal loudness to an average SPL of >70 dB at 30 cm during sustained /a/ phonation of at least 10 seconds given min cues in order to increase vocal respiratory support required for functional communication.   Goal Progress Good, goal partially met. Across the last 3 consecutive sessions targeting goal. EMST utilized before start of of LSVT amplitude based voice exercises - Pt demonstrated 5-10 repetitions of LSVT trained amplitude-based voice exercises given min to mod cues/models from SLP to maintain target volume and to take deep diaphragmatic breaths prior to each repetition.Pt completed the maximum sustained /a/ with an average of 68.4 -70.5 dB  and average of 7.28-7.51 seconds duration. Pt completed the maximum fundamental frequency range tasks (high/low) with 5-10 repetitions in the rage of 174-212.5 hz or f3-g3 while maintaining volume >70 dB   Target Date 05/05/24   Date Met 03/14/24   SLP Goal 2   Goal Identifier Loudness   Goal Description Patient will increase vocal loudness during narrative reading and semi-spontaneous conversation tasks to a minimum range of 68-73 dB at 30 cm given min cues in order to improve vocal loudness for daily communication tasks.   Goal Progress Good, goal partially met. Across the last 3 consecutive sessions targeting goal. EMST utilized before start of of LSVT amplitude based voice exercises. On average, Pt read aloud 10 functional phrases/sentences x 3 with an average 73 dB given min-mod cues from SLP to maintain target volume. Paragraphs/extended reading with an average of 70.67 db.  Off the cuff questions  based on activities of what he is doing for the rest of day: 69.6-70 db.8 Min-mod cues throughout.   Target Date 05/05/24  Date Met 03/14/24      Treatment Interventions (SLP)   Treatment Interventions Treatment Speech/Lang/Voice   Treatment Speech/Lang/Voice   Treatment of Speech, Language, Voice Communication&/or Auditory Processing (83866) 41 Minutes   Speech/Lang/Voice Speech/Lang/Voice 2   Speech/Lang/Voice 1 Breath   Speech/Lang/Voice 1 - Details Breath: Pt demonstrated 5 repetitions of LSVT trained amplitude-based voice exercises given min to mod cues/models from SLP to maintain target volume and to take deep diaphragmatic breaths prior to each repetition.Pt completed the maximum sustained /a/ with an average of 68.4 dB and 5.14 seconds duration. Completed 10 EMST given min cues or less. Trained in low to high to low glides and 'may me my mo moo' to facilitate pitch change and breath support. Completed low to high to low glides: avg frequency of 223 Hz and avg volume of 73 dB. May-me-my-mo-moo: avg freq 157  Hz, avg vol: 69 Hz   Speech/Lang/Voice 2 Loudness   Speech/Lang/Voice 2 - Details Pt read aloud 10 functional phrases/sentences x 2  with an average 73.5 dB given min-mod cues from SLP to maintain target volume.  Paragraphs/extended reading with an average of 68 db.   Off the cuff questions : 67 Min-mod cues throughout   Skilled Intervention Provided written and verbal information on.;Educated patient on vocal health strategies.;Educated patient on risks.;Instructed/trained/cued larynx function tasks;Instructed/trained/cued monitored LSVT and hierarchy tasks;Demonstrated voice exercises;Modeled compensatory strategies;Provided feedback on performance of tasks;Facilitated respiratory, laryngeal, oral integration;Measured key voice production parameters   Patient Response/Progress Reduced progress suspect d/t pt not wanting to be seen more than x1 a week, some carry-over at home, however limited. Possible increase in aspiration and/or dysphagia, pt declined swallow evaluation reflecting in progress. Skilled education re: need for HEP and consistency to aid in generalization of voice loudness/intelligibility. Haresh stated comprehension.   Education   Learner/Method Patient;Listening;No Barriers to Learning   Education Comments Session targets, performance, rationale behind therapy exercises, HEP upon discharge   Plan   Home program daily - sustained /ah/ 10 sets twice a day record, high/low /ah/, functional phrases, extended reading   Plan for next session discharge by pt request. Pt endorsed feeling confident in continuing program ind.   Total Session Time   Total Treatment Time (sum of timed and untimed services) 41     Progress Toward Goals:   Progress this reporting period: Haresh has made modest progress this reporting period and since start of POC, as described above. Haresh continues to present with hypophonia, which negatively impacts his ability to express and be understood. Continued skilled intervention is  recommended to further assist Haresh for more effective and efficient communication.      Client Self (Subjective) Report for Progress Note Reporting Period: Haresh is a 73-year-old male being seen for outpatient speech-language intervention. Haresh is seen at a frequency of twice weekly. He attended 10 sessions since start of POC (2 since last PN). He did not want to be seen more than x1/weekly. Haresh presented with good participation during sessions. Limited completion of HE/carry-over of LSVT principle/voice amplitude exercises per pt endorsement. This last intervention session, SPEAKOUT principles were trialed to increase pt participation, however he declined option. Inconsistent completion of EMST and LSVT voice amplitude intervention, only wanting to attend x1 session per week, aspiration on reflux/swallow difficulties could play role in progress despite pt stating comprehension on multiple occurrences of need for twice daily exercises.       Objective Measurements: Data collected during treatment sessions and summarized in each goal as listed above    DISCHARGE  Reason for Discharge: No further expectation of progress.  Patient chooses to discontinue therapy.    Discharge Plan: Patient to continue home program.  Other services: OT/PT.    Referring Provider:  Ángel Hill     Thank you for referring Haresh Rock to outpatient speech therapy at [facility].  Please call [phone] or email  Amber Roger MS, CCC-SLP at [email or web address] with any questions or concerns.      Amber Roger MS, CCC-SLP    Speech-Language Pathologist

## 2024-03-15 LAB — INR HOME MONITORING: 2.3 RATIO (ref 2–3)

## 2024-03-18 ENCOUNTER — DOCUMENTATION ONLY (OUTPATIENT)
Dept: ANTICOAGULATION | Facility: CLINIC | Age: 74
End: 2024-03-18
Payer: COMMERCIAL

## 2024-03-18 DIAGNOSIS — I48.0 PAROXYSMAL ATRIAL FIBRILLATION (H): ICD-10-CM

## 2024-03-18 DIAGNOSIS — Z86.711 PERSONAL HISTORY OF PE (PULMONARY EMBOLISM): ICD-10-CM

## 2024-03-18 DIAGNOSIS — Z79.01 LONG TERM CURRENT USE OF ANTICOAGULANT THERAPY: Primary | ICD-10-CM

## 2024-03-18 NOTE — PROGRESS NOTES
ANTICOAGULATION  MANAGEMENT-Home Monitor Managed by Exception    Haresh PHAN Pranav 73 year old male is on warfarin with therapeutic INR result. (Goal INR 2.0-3.0)    Recent labs: (last 7 days)     03/15/24  2026   INR 2.3       Previous INR was Therapeutic  Medication, diet, health changes since last INR:chart reviewed; none identified  Contacted within the last 12 weeks by phone on 2/26/24 (~ 12 weeks: 5/17/24)   Last [facility] referral date: 07/03/2023      MINNIE     Haresh was NOT contacted regarding therapeutic result today per home monitoring policy manage by exception agreement.   Current warfarin dose is to be continued:     Summary  As of 3/18/2024      Full warfarin instructions:  3.75 mg every Sun, Tue, Fri; 2.5 mg all other days   Next INR check:  3/22/2024             ?   Akilah Sharp RN  Anticoagulation Clinic  3/18/2024    _______________________________________________________________________     Anticoagulation Episode Summary       Current INR goal:  2.0-3.0   TTR:  84.7% (1 y)   Target end date:  Indefinite   Send INR reminders to:  [facility]    Indications    Long-term (current) use of anticoagulants [Z79.01] [Z79.01]  Atrial fibrillation (H) (Resolved) [I48.91]  Antiphospholipid antibody syndrome (H24) (Resolved) [D68.61]  Personal history of DVT (deep vein thrombosis) (Resolved) [Z86.718]  Atrial fibrillation  unspecified type (H) (Resolved) [I48.91]  Paroxysmal atrial fibrillation (H) [I48.0]  Chronic atrial fibrillation (H) (Resolved) [I48.20]  Personal history of PE (pulmonary embolism) [Z86.711]             Comments:  JESSICA rodas to manage by exception             Anticoagulation Care Providers       Provider Role Specialty Phone number    Anya Nowak MD Referring Family Medicine [phone]    Elizabeth Balderas MD Referring HOSPITALIST [phone]

## 2024-03-19 ENCOUNTER — PATIENT OUTREACH (OUTPATIENT)
Dept: CARE COORDINATION | Facility: CLINIC | Age: 74
End: 2024-03-19
Payer: COMMERCIAL

## 2024-03-19 NOTE — PROGRESS NOTES
Clinic Care Coordination Contact  Community Health Worker Follow Up    Care Gaps:     Health Maintenance Due   Topic Date Due    RSV VACCINE (Pregnancy & 60+) (1 - 1-dose 60+ series) Never done    ZOSTER IMMUNIZATION (1 of 2) 11/14/2016    IPV IMMUNIZATION (3 of 3 - Adult catch-up series) 03/19/2017    DTAP/TDAP/TD IMMUNIZATION (2 - Td or Tdap) 09/11/2022    DIABETIC FOOT EXAM  10/11/2023    EYE EXAM  02/13/2024       Had last eye exam in Nov 2023, he will request records to be sent at his 6 month follow up. Will discuss immunizations at next PCP visit.     Care Plan:   Care Plan: General  Work on increasing strength and stamina for a year after reaching 100%       Problem: HP GENERAL PROBLEM       Goal: General  work on walking without the walker, and increasing my strength and stamina.  For at least 1 year after reaching 100%.       Start Date: 12/13/2021 Expected End Date: 6/13/2024    This Visit's Progress: 50% Recent Progress: 40%    Note:     Barriers: suffers from parkinson's  Strengths: engaged in care coordination  Patient expressed understanding of goal: yes  Action steps to achieve this goal:  1. I will continue walking without the walker. No longer using the walker as of 3/15/22. Completed action step  2. I will go outside without the walker when I feel strong enough and have increased my strength and stamina.  Continue to work on for at least a year.  3. I will frequently walk around inside the house to increase my strength and stamina.  Continue to work on for at least a year.                            Care Plan: General - Working on balance and items in the path,       Problem: HP GENERAL PROBLEM       Goal: General Goal - work on balance and avoiding items in the path       Start Date: 10/28/2022 Expected End Date: 10/28/2024    This Visit's Progress: 40% Recent Progress: 30%    Note:     Barriers: parkinson's  Strengths: engaged in care coordination  Patient expressed understanding of goal: yes    Action steps to achieve this goal:  1. I will work on walking around items in my path without losing my balance.  2. I will work on maintaining my balance when I walk through the house.  3. I will use my walker or cane as needed to maintain my balance.                                Intervention and Education during outreach: Patient has had no recent falls. He does have a walker at his bedside if he needs it when he goes to the bathroom in the middle of the night, other than that he does not use his walker. He is still attending PT every other week for 1 session as well as OT.   He has also started walking on his treadmill for 10 minutes 3-4 times a week to help with his balance and endurance.     Patient completed his speech therapy and felt that it was repetitive and he didn't feel that it was beneficial.     He enjoys OT and they have recently started having him play the piano. He does have a piano at home as well which he hasn't played in years. He also enjoys the skill/games that test his hand and eye coordination.      CHW Plan: CHW will continue to support patient with goals through routine scheduled outreach.     Next outreach due: 4/18/24

## 2024-03-21 ENCOUNTER — THERAPY VISIT (OUTPATIENT)
Dept: PHYSICAL THERAPY | Facility: CLINIC | Age: 74
End: 2024-03-21
Payer: COMMERCIAL

## 2024-03-21 DIAGNOSIS — G20.A1 PARKINSON'S DISEASE WITHOUT DYSKINESIA, UNSPECIFIED WHETHER MANIFESTATIONS FLUCTUATE (H): Primary | ICD-10-CM

## 2024-03-21 DIAGNOSIS — R26.89 IMPAIRED GAIT AND MOBILITY: ICD-10-CM

## 2024-03-21 PROCEDURE — 97112 NEUROMUSCULAR REEDUCATION: CPT | Mod: GP

## 2024-03-21 PROCEDURE — 97110 THERAPEUTIC EXERCISES: CPT | Mod: GP

## 2024-03-22 ENCOUNTER — DOCUMENTATION ONLY (OUTPATIENT)
Dept: ANTICOAGULATION | Facility: CLINIC | Age: 74
End: 2024-03-22
Payer: COMMERCIAL

## 2024-03-22 DIAGNOSIS — Z86.711 PERSONAL HISTORY OF PE (PULMONARY EMBOLISM): ICD-10-CM

## 2024-03-22 DIAGNOSIS — I48.0 PAROXYSMAL ATRIAL FIBRILLATION (H): ICD-10-CM

## 2024-03-22 DIAGNOSIS — Z79.01 LONG TERM CURRENT USE OF ANTICOAGULANT THERAPY: Primary | ICD-10-CM

## 2024-03-22 LAB — INR HOME MONITORING: 2.3 RATIO (ref 2–3)

## 2024-03-22 NOTE — PROGRESS NOTES
ANTICOAGULATION  MANAGEMENT-Home Monitor Managed by Exception    Haresh EMILIE Rock 73 year old male is on warfarin with therapeutic INR result. (Goal INR 2.0-3.0)    Recent labs: (last 7 days)     03/22/24  1225   INR 2.3       Previous INR was Therapeutic  Medication, diet, health changes since last INR:chart reviewed; none identified  Contacted within the last 12 weeks by phone on 2/26/24  Last [facility] referral date: 07/03/2023      MINNIE Hutson was NOT contacted regarding therapeutic result today per home monitoring policy manage by exception agreement.   Current warfarin dose is to be continued:     Summary  As of 3/22/2024      Full warfarin instructions:  3.75 mg every Sun, Tue, Fri; 2.5 mg all other days   Next INR check:  3/29/2024             ?   Mariela Berger RN  Anticoagulation Clinic  3/22/2024    _______________________________________________________________________     Anticoagulation Episode Summary       Current INR goal:  2.0-3.0   TTR:  84.8% (1 y)   Target end date:  Indefinite   Send INR reminders to:  [facility]    Indications    Long-term (current) use of anticoagulants [Z79.01] [Z79.01]  Atrial fibrillation (H) (Resolved) [I48.91]  Antiphospholipid antibody syndrome (H24) (Resolved) [D68.61]  Personal history of DVT (deep vein thrombosis) (Resolved) [Z86.718]  Atrial fibrillation  unspecified type (H) (Resolved) [I48.91]  Paroxysmal atrial fibrillation (H) [I48.0]  Chronic atrial fibrillation (H) (Resolved) [I48.20]  Personal history of PE (pulmonary embolism) [Z86.711]             Comments:  JESSICA rodas to manage by exception             Anticoagulation Care Providers       Provider Role Specialty Phone number    Anya Nowak MD Referring Family Medicine [phone]    Elizabeth Balderas MD Referring HOSPITALIST [phone]

## 2024-03-28 ENCOUNTER — THERAPY VISIT (OUTPATIENT)
Dept: PHYSICAL THERAPY | Facility: CLINIC | Age: 74
End: 2024-03-28
Payer: COMMERCIAL

## 2024-03-28 ENCOUNTER — THERAPY VISIT (OUTPATIENT)
Dept: OCCUPATIONAL THERAPY | Facility: CLINIC | Age: 74
End: 2024-03-28
Payer: COMMERCIAL

## 2024-03-28 DIAGNOSIS — G20.A1 PARKINSON'S DISEASE WITHOUT DYSKINESIA, UNSPECIFIED WHETHER MANIFESTATIONS FLUCTUATE (H): Primary | ICD-10-CM

## 2024-03-28 DIAGNOSIS — R27.8 IMPAIRED COORDINATION OF UPPER EXTREMITY: ICD-10-CM

## 2024-03-28 DIAGNOSIS — R26.89 IMPAIRED GAIT AND MOBILITY: ICD-10-CM

## 2024-03-28 PROCEDURE — 97110 THERAPEUTIC EXERCISES: CPT | Mod: GO | Performed by: OCCUPATIONAL THERAPIST

## 2024-03-28 PROCEDURE — 97110 THERAPEUTIC EXERCISES: CPT | Mod: GP

## 2024-03-28 PROCEDURE — 97112 NEUROMUSCULAR REEDUCATION: CPT | Mod: GP

## 2024-03-29 ENCOUNTER — DOCUMENTATION ONLY (OUTPATIENT)
Dept: ANTICOAGULATION | Facility: CLINIC | Age: 74
End: 2024-03-29
Payer: COMMERCIAL

## 2024-03-29 DIAGNOSIS — Z86.711 PERSONAL HISTORY OF PE (PULMONARY EMBOLISM): ICD-10-CM

## 2024-03-29 DIAGNOSIS — Z79.01 LONG TERM CURRENT USE OF ANTICOAGULANT THERAPY: Primary | ICD-10-CM

## 2024-03-29 DIAGNOSIS — I48.0 PAROXYSMAL ATRIAL FIBRILLATION (H): ICD-10-CM

## 2024-03-29 LAB — INR HOME MONITORING: 2.2 RATIO (ref 2–3)

## 2024-03-29 NOTE — PROGRESS NOTES
ANTICOAGULATION  MANAGEMENT-Home Monitor Managed by Exception    Haresh EMILIE Rock 73 year old male is on warfarin with therapeutic INR result. (Goal INR 2.0-3.0)    Recent labs: (last 7 days)     03/29/24  1231   INR 2.2       Previous INR was Therapeutic  Medication, diet, health changes since last INR:chart reviewed; none identified  Contacted within the last 12 weeks by phone on 2/26/24  Last [facility] referral date: 07/03/2023      MINNIE Hutson was NOT contacted regarding therapeutic result today per home monitoring policy manage by exception agreement.   Current warfarin dose is to be continued:     Summary  As of 3/29/2024      Full warfarin instructions:  3.75 mg every Sun, Tue, Fri; 2.5 mg all other days   Next INR check:  4/5/2024             ?   Allyn Doe RN  Anticoagulation Clinic  3/29/2024    _______________________________________________________________________     Anticoagulation Episode Summary       Current INR goal:  2.0-3.0   TTR:  84.8% (1 y)   Target end date:  Indefinite   Send INR reminders to:  [facility]    Indications    Long-term (current) use of anticoagulants [Z79.01] [Z79.01]  Atrial fibrillation (H) (Resolved) [I48.91]  Antiphospholipid antibody syndrome (H24) (Resolved) [D68.61]  Personal history of DVT (deep vein thrombosis) (Resolved) [Z86.718]  Atrial fibrillation  unspecified type (H) (Resolved) [I48.91]  Paroxysmal atrial fibrillation (H) [I48.0]  Chronic atrial fibrillation (H) (Resolved) [I48.20]  Personal history of PE (pulmonary embolism) [Z86.711]             Comments:  JESSICA rodas to manage by exception             Anticoagulation Care Providers       Provider Role Specialty Phone number    Anya Nowak MD Referring Family Medicine [phone]    Elizabeth Balderas MD Referring HOSPITALIST [phone]

## 2024-04-03 ENCOUNTER — THERAPY VISIT (OUTPATIENT)
Dept: OCCUPATIONAL THERAPY | Facility: CLINIC | Age: 74
End: 2024-04-03
Payer: COMMERCIAL

## 2024-04-03 ENCOUNTER — THERAPY VISIT (OUTPATIENT)
Dept: PHYSICAL THERAPY | Facility: CLINIC | Age: 74
End: 2024-04-03
Payer: COMMERCIAL

## 2024-04-03 DIAGNOSIS — R26.89 IMPAIRED GAIT AND MOBILITY: ICD-10-CM

## 2024-04-03 DIAGNOSIS — G20.A1 PARKINSON'S DISEASE WITHOUT DYSKINESIA, UNSPECIFIED WHETHER MANIFESTATIONS FLUCTUATE (H): Primary | ICD-10-CM

## 2024-04-03 DIAGNOSIS — R27.8 IMPAIRED COORDINATION OF UPPER EXTREMITY: ICD-10-CM

## 2024-04-03 PROCEDURE — 97535 SELF CARE MNGMENT TRAINING: CPT | Mod: GO | Performed by: OCCUPATIONAL THERAPIST

## 2024-04-03 PROCEDURE — 97110 THERAPEUTIC EXERCISES: CPT | Mod: GP

## 2024-04-03 NOTE — PROGRESS NOTES
04/03/24 0500   Appointment Info   Signing clinician's name / credentials Blanca Beltran, PT, DPT   Visits Used 11  (6/10 for PN)   Medical Diagnosis Parkinson's Disease, polyneuropathy   PT Tx Diagnosis impaired balance and gait   Progress Note/Certification   Start of Care Date 12/08/23   Onset of illness/injury or Date of Surgery 11/15/23   Therapy Frequency 1x/week  (would benefit from greater frequency but patient declined)   Predicted Duration 90 days   Certification date from 03/07/24   Certification date to 06/05/24   Progress Note Completed Date 01/29/24   PT Goal 1   Goal Identifier MET: 5xSTS   Goal Description PT will perform 5xSTS in 16 sec or less due to improvements in functional strength and mobility   Goal Progress eval: 20.62, 1/29 improved to 12.37 seconds, 3/6 13.75 sec   Target Date 03/07/24   Date Met 01/29/24   PT Goal 2   Goal Identifier gait speed   Goal Description Pt will improve gait speed to >1.10 m/s   Rationale to maximize safety and independence within the community   Goal Progress eval: 0.94 m/s; 1/29: 1.02 m/s, unable to be re-assessed at discharge due to patient reporting wanting last session today at end   Target Date 06/05/24   PT Goal 3   Goal Identifier miniBEST   Goal Description Pt will score 22 or greater on miniBEST due to improvements in static and dynamic balance   Rationale to maximize safety and independence with performance of ADLs and functional tasks   Goal Progress eval: 18/28, 1/29: declined to 15/28, 4/3 - unable to be re-assessed at discharge due to patient reporting wanting last session today at end of session   Target Date 06/05/24   PT Goal 4   Goal Identifier HEP   Goal Description Pt will demonstrate independence with HEP in order to improve self management of symptoms   Goal Progress 1/29: working on exercises requrily, 4/3: continues to work on exercises regularily, reports feeling good continuing with exercises at own   Target Date 06/05/24    Subjective Report   Subjective Report Doing more shoveling and some walking every other day. 10 minutes at 2.0 mph. Noticing R hip pain especially when rolling in bed R>L. Balance has felt good.   Treatment Interventions (PT)   Interventions Therapeutic Procedure/Exercise;Neuromuscular Re-education   Therapeutic Procedure/Exercise   Therapeutic Procedures: strength, endurance, ROM, flexibility minutes (70173) 42   Therapeutic Procedures Ther Proc 2   Ther Proc 1 Aerobic Exericse   Ther Proc 1 - Details Initial vitals with HR 65 BPM, /69 mmHg Walking overground x6 minutes no weights and intermittant pressing of speed, /70 mmHg HR 73 BPM following. OMNI 6/10. Ended session with sci fit for hip mobility and gentle aeroibic exercise at 1.5 resistance x6 minutes, OMNI 7/10 /71 mmHg following   Ther Proc 2 Hip strengthening/trunk mobility   Ther Proc 2 - Details Supine bridge x10 rep with cuign for maxmium ROM, Supine PWR step over cindi x15 rep/direction, trial of adding bridge to PWR step x5 rep/dierction - hard to maintain bridge throughout. Clamshell x12 rep/side - external cuing for amplitude of movement, more challengeing on R. [place] and arrow x10 rep/side with increased mobility with L rotation. R hip a little worse following. Trial of R hamstring passive stretch and gently long axis traction with slight improvement. /68 mmHg following mat exercises.   Skilled Intervention aeroibic training, strengtheing, cuing, demo, monitor vitals   Patient Response/Progress BP dropping with exercise despite supine/seated exercise - limits tolerance, hip bothered with some exercises but pain remained stable at end, overall fair tolerance   Plan   Plan for next session discharge   Total Session Time   Timed Code Treatment Minutes 42   Total Treatment Time (sum of timed and untimed services) 42       DISCHARGE  Reason for Discharge: Patient chooses to discontinue therapy prior to goals being assessed. Feels  he is ready to handle home exercise program. Patient continues to show impaired activity tolerance seeming to be related to BP with improper BP response to exercise but had progressed in ability to complete regular exercise at home. Patient has overall progressed over plan of care but limited by decreased number of sessions and gaps in POC.     Equipment Issued:     Discharge Plan: Patient to continue home program.    Referring Provider:  Ángel Hill

## 2024-04-03 NOTE — PROGRESS NOTES
Occupational Therapy Discharge Note     04/03/24 0500   Appointment Info   Treating Provider Farrah Ricardo, OTR/L   Total/Authorized Visits 10   Medical Diagnosis Parkinson's Disease   OT Tx Diagnosis Impaired coordination and functional mobility   Precautions/Limitations feeding tube   Quick Add  Certification   Progress Note/Certification   Start Of Care Date 12/05/23   Onset of Illness/Injury or Date of Surgery 11/15/23   Therapy Frequency 2x/week x 4 weeks, decreasing to 1x/week x 4 weeks   Predicted Duration 8 weeks   Certification date from 02/28/24   Certification date to 05/27/24  (cert extended to 12 weeks for scheduling purposes)   OT Goal 1   Goal Identifier 1-Coordination   Goal Description Patient will use high amplitude strategies to improve FMC for buttoning, writing as demonstrated by completing the 9HPT in less than 25 seconds with both R and L hand.   Goal Progress Goal not met but improvements made.  See objective measures.  He reports using hand flicks prior to FMC tasks.   Target Date 05/24/24   OT Goal 2   Goal Identifier 2-UE HEP   Goal Description Patient will demonstrate independence with high amplitude/PWR! HEP to maximize ROM/function, reduce rigidety, and improve ease/independence with mobility/ADLs.   Target Date 05/24/24   Date Met 04/03/24   OT Goal 3   Goal Identifier 3-ADL routine   Goal Description Patient will use high amplitude ROM/effort to complete UE/LE dressing independently to decrease amount of time to complete morning routine.   Target Date 05/24/24   Goal Progress Goal partially met with improved timed scores for both UE/LE dressing during sessions.  Patient reports that he is in no hurry to get dressed in the mornings so hasn't been thinking about using speed/PWR! with dressing.   Subjective Report   Subjective Report Patient reports that things have gotten easier with getting out of bed and getting dressed but that his hip is bothering him more (when sleeping/lying  "down).   Objective Measure 1   Objective Measure Timed Snapping of shirt (4 button to unbutton and button)   Details 35\"; After education and hand flicks 27\", 25\" and 24\"   Objective Measure 2   Objective Measure Timed LE dressing (socks/shoes off/on   Details 1'25\" (after exercise)   Objective Measure 3   Objective Measure Timed Coat   Details 23\" (no difficulties)   Objective Measure 4   Objective Measure Timed shorts   Details 1'45\", after education: 1'00\"   Objective Measure 5   Objective Measure Timed Writing ([place])   Details 10.8 sec (prior to education)   Objective Measure 6   Objective Measure Physical Performance Test   Details Score 18/24   Objective Measure 7   Objective Measure Dynavision Mode A   Details 59 (average is 52+)   Objective Measure 8   Objective Measure Dynavision Mode B   Details 29 and 52 (average is 42+)   Objective Measure 9   Objective Measure Dynavision Mode B Divided Attention   Details 29 9/10#, 30 6/10#s, 31 7/10#s (average 35+)   Objective Measure 10   Objective Measure BiVaba scancourse   Details 10/20 and 18/20 (18/20 WNLs)   Objective Measure 11   Objective Measure 9HPT   Details R:27.61\" (improved from 33.2\"); L: 25.08\" (improved from 27.7\")   Self Care/Home Management   Self-Care/Home Mgmt/ADL, Compensatory, Meal Prep Minutes (63390) 40 Minutes   Self Care 1 - Details Intervention focused on high amplitude ROM/effort to improve ease and independence with getting on/off the toilet.  Patient reports that he is noticing he gets \"stuck\".  Educated patient on using the rocking method to assist with rhythm to get up and he practiced x 5 with reports of improved ease.  Discussed bathroom set up with door/toilet set up and recommendations of use of grab bars by shower to improve safety with stepping over (reports currently holding on to a towel bar).  Educated patient on suction cup bars as well as sturdy grab bar installation.  Patient has a step up prior to " "stepping into his shower.  Practiced simulated shower transfer with recommendation of patient to side step and think big with stepping over.  He practiced x 5, reporting feeling more confident using a grab bar.  Issued resources on where to purchase as well as installation options. To improve coordination/ease with medication set up, patient educated on use of dycem to  pills and prevent spilling vs. picking out from palm of hands. Issued a piece for him to use at home.   Skilled Intervention skilled facilitation of using high amplitude ROM/effort to improve perfomrance and ease with LE dressing   Patient Response/Progress \"i am in no hurry to get dressed so it's hard to think about using big movements.\"   Plan   Plan for next session Discharge from therapy   Comments   Comments OT Discharge Note:  Patient seen for a total of 10 OP OT sessions for the above goals, facilitating high amplitude ROM/effort into dressing, transfers, and functional mobility.  He also completed pre-driving assessments and scoring WNLs for reaction time/visual scanning and slightly BNLS for divided attention, recommending to avoid driving during rush hour/freeways and stick to familiar areas.  Issued activities for him to continue to work on dual tasking/attention which he really enjoys.  No further skilled OT intervention needed at this time.  Recommend tune up in 9-12 months to assess safety and independence with ADL/IADLs.   Total Session Time   Timed Code Treatment Minutes 40   Total Treatment Time (sum of timed and untimed services) 40         DISCHARGE  Reason for Discharge: Patient has met all goals.    Equipment Issued: dycem, HEP    Discharge Plan: Patient to continue home program.    Referring Provider:  Ángel Hill    "

## 2024-04-05 ENCOUNTER — DOCUMENTATION ONLY (OUTPATIENT)
Dept: ANTICOAGULATION | Facility: CLINIC | Age: 74
End: 2024-04-05
Payer: COMMERCIAL

## 2024-04-05 DIAGNOSIS — I48.0 PAROXYSMAL ATRIAL FIBRILLATION (H): ICD-10-CM

## 2024-04-05 DIAGNOSIS — Z86.711 PERSONAL HISTORY OF PE (PULMONARY EMBOLISM): ICD-10-CM

## 2024-04-05 DIAGNOSIS — Z79.01 LONG TERM CURRENT USE OF ANTICOAGULANT THERAPY: Primary | ICD-10-CM

## 2024-04-05 LAB — INR HOME MONITORING: 2.4 RATIO (ref 2–3)

## 2024-04-05 NOTE — PROGRESS NOTES
ANTICOAGULATION  MANAGEMENT-Home Monitor Managed by Exception    Haresh EMILIE Rock 73 year old male is on warfarin with therapeutic INR result. (Goal INR 2.0-3.0)    Recent labs: (last 7 days)     04/05/24  1439   INR 2.4       Previous INR was Therapeutic  Medication, diet, health changes since last INR:chart reviewed; none identified  Contacted within the last 12 weeks by phone on  2/26/24   Last [facility] referral date: 07/03/2023      MINNIE Hutson was NOT contacted regarding therapeutic result today per home monitoring policy manage by exception agreement.   Current warfarin dose is to be continued:     Summary  As of 4/5/2024      Full warfarin instructions:  3.75 mg every Sun, Tue, Fri; 2.5 mg all other days   Next INR check:  4/12/2024             ?   Blanca Rodriguez RN  [facility]  4/5/2024    _______________________________________________________________________     Anticoagulation Episode Summary       Current INR goal:  2.0-3.0   TTR:  85.1% (1 y)   Target end date:  Indefinite   Send INR reminders to:  [facility]    Indications    Long-term (current) use of anticoagulants [Z79.01] [Z79.01]  Atrial fibrillation (H) (Resolved) [I48.91]  Antiphospholipid antibody syndrome (H24) (Resolved) [D68.61]  Personal history of DVT (deep vein thrombosis) (Resolved) [Z86.718]  Atrial fibrillation  unspecified type (H) (Resolved) [I48.91]  Paroxysmal atrial fibrillation (H) [I48.0]  Chronic atrial fibrillation (H) (Resolved) [I48.20]  Personal history of PE (pulmonary embolism) [Z86.711]             Comments:  JESSICA rodas to manage by exception             Anticoagulation Care Providers       Provider Role Specialty Phone number    Anya Nowak MD Referring Family Medicine [phone]    Elizabeth Balderas MD Referring HOSPITALIST [phone]

## 2024-04-08 NOTE — PROGRESS NOTES
Diagnosis/Summary/Recommendations:    PATIENT: Haresh Rock  73 year old male     : 1950    KEN: 2024    MRN: [id number]  [address] [place] [id number] [phone] (M)  [phone] (H)  [email or web address]     - sheyla Ramirez - son  Gemma - daughter  Sheyla Rock  [phone]     [email or web address]  [email or web address]     [phone]     Sitting in a lazy boy     [phone]     Assessment:  (G20) Parkinson disease (H)  (primary encounter diagnosis)     Dopamine scan - (datscan)  11/3/2020  A presynaptic dopaminergic deficit is demonstrated bilaterally.     Review of diagnosis    parkinson  Avoidance of dopamine blockers yes  Motor complication review  -   Review of Impulse control disorders no  Review of surgical or medication options yes     Avoidance of dopamine blockers   Not ta josiane     Motor complication review   no     Review of Impulse control disorders   denies     Review of surgical or medication options   reviewed     Gait/Balance/Falls   No falls     Exercise/Therapy performed/offered   Doing shoWEMSing snow     Cognitive/Driving   Driving - not problems  No cognitive problems - no changes        Mood   Daughter gemma with bipolar - 2 or 3 miles away   Son july with mood issues  2 or 3 miles away   He has some depression     Living with Sheyla - has ongoing back pain - getting a tooth extracted this afternoon;  Constipation, heart and bladder issues.   Constipation or diarrhea - has problems controlling her bowels     Hallucinations/delusions   No hallucinations     Sleep   Pseudoeph-doxylamine DM APAP nyquil - not using  Sleep pretty well  Nocturia 2/noc  Falls asleep during the day  Napping during the day  Falls asleep when watching news  Falls asleep during the afternoon  Up 3/noc for bathroom   Midnight goes to bed and up at 8am  Naps during day   Not talking in sleep or snoring.  No unusual behaviors  Wakes up before 3am, 5/6a and 7am      Bladder/Renal/Prostate/Gyn/Other  Renal function.   May have stage 2 disease.      Prostate - denies problems. He has has nocturia couple times per night.   Denies elevated psa     Prostate surgery; urinating better     Dutasteride avodart 0.5mg - off   Tamsulosin flomax 0.4mg - stopped  Had low blood pressure and fluids     July 5th ED visit and got catheter  Surgery yesterday laser and should get catheter out  Dr Jose G Hawley     GI/Constipation/GERD   Swallow study 9/22/2020  1. Multiple events of deep laryngeal penetration with thinner barium consistencies with intermittent aspiration.      Gallstones but has not gallbladder removed.  Had a gallbladder attack x 1 and is not on actigall     Bisacodyl dulcolax 5mg EC tablet - not using   Calcium carbonate tums 500mg ?  Nutritional supplement - 1 bottle per day  - not using   Pantoprazole protonix 20mg - not using  Polythyelene glycol miralax - not using   Prevident 5000 dry mouth - using  Senna-docusate senexon-S senokot-S  - no  Weight loss better with feeding tube  No constipation  Has some seepage  When urinates he has some stool     Had bloating and blockage with the feeding tube placement and had emergency surgery   10/29/2021 Scan showed problems     Feeding tube has helped him gain 20 lbs - he gets feedings 1 hour after medications so 9am, 3pm and 9pm     Takes parkinson medications by mouth for the first two doses and crush the last one of the day through the feeding tube  Schedule is 8am, 2pm and 8pm     With his fecal seepage discussed the use of a bidet or adapting his toilet with one - h e has a raised toilet seat so not clear what needs to be done. Consider seeing Occupational therapy.      Formula switched from abbott to nestle  161 or so lbs        ENDO/Lipid/DM/Bone density/Thyroid  Cholesterol  Rosuvastatin crestor 40mg   Vitamin D3 cholecalciferol 50 mcg 2000 units  Had diabetes in the past from prednisone  And this may have resolved  May have  a thyroid nodule.  TSH was normal.   A1c was 5.5         Cardio/heart/Hyper or Hypotensive   Flecainide tambocor 50mg twice daily  Metoprolol succinate ER Toprol XL 25mg 24 hr tablet  Blood pressure has been okay   122/63  Low bp has cleared up  MRI of heart next week     Vision/Dry Eyes/Cataracts/Glaucoma/Macular   Wears glasses  Eye - left eye has been poor since age 2 due to a lazy eye - amblyopia  Right eye post cataract surgery had an artificial lens put in and does not wear glasses till the evening         Heme/Anticoagulation/Antiplatelet/Anemia/Other  Folic acid folvite 1mg - not taking   Anticoagulated  Warfarin     Hereditary hemochromatosis gene - gene that increases his risk and has not had problems with this. Had had phlebotomies x 2 in the past.      AYUSH Torres     History of pulmonary embolii - back in 2004. This was related to blood clotting problems.   May have had a DVT. Reportedly has had antiphospholipid antibody. He is on coumadin.      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 Had a transplant from Dr. 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  No problems since then.      Graft vs host     He has high sed rate and will be seeing ID next week   crp was 12.8  Sed rate was 90  8/3/2021  Working with Marcelo Jacques     ENT/Resp  Hodgkin's lymphoma in the past 2011 and had chemotherapy for this and followed with CT scan and reportedly this is gone.      Pulmonary hypertension in the past.   Fluticasone flonase 50 mcg/act nasal spray  Loratadine claritin 10mg    CT of lung next      Hearing. Feels he has wax in his left ear and partial problem with the right ear. It is variable problem. Has used ear wax removal.      Feeding tube has helped him gain 20 lbs - he gets feedings 1 hour after medications so 9am, 3pm and 9pm     Skin/Cancer/Seborrhea/other  No skin cancer     Musculoskeletal/Pain/Headache  bilateral knee replacements.     Other:  He  "reports a neuropathy and that his foot drop has resolved. 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     No constipation - bowels are a bit loose  Getting feedings - using nestle product  He has reflux -  Has a pillow but not a pillow wedge  He has to brush his teeth several times daily  Avoiding water pik because of difficulty handling secretions   Teeth gets crusty - tartar     May want to talk with his pcp about the omeprazole - should he be taking a different product or/and higher dose  Famotidine (pepcid) or Pantoprazole (protonix)     Overall his parkinson is stable  No falls  Refilled his sinemet that is [facility]     Ongoing nocturia - had tried medication in the past that did not help  Not sure if he has been on mirabegron/myrbetriq, ubegron/gemtesa  Has seen Dr. Hawley jan 2023   Benign prostatic hyperplasia with urinary retention  (primary encounter diagnosis)  Comment: Bladder scan showed minimal residual urine today.  (N45.1) Epididymitis     Blood pressure has been stable  Anticoagulated   Using flecainide for his heart     Ongoing hematological care with Dr. JUNIOR    Not exercising  No falls  He arrives today by himself  He had an ulcer left foot - was wearing a boot and was off balance and gave up on it and was healed   He has a neuropathy and had a stick that stuck there that he was unaware of - and a woman who does his toes identified it and sent him in to see his doctor     He has not noticed wearing off - taking his medication 3/day      Ordered big and loud t herapy =      Swallowing - he had some problems with tylenol caplet and coughed it up as he had problems swallowing it.   He got one tylenol down and was not able to get the other pill down  He has a lot of  \"reflux\" due to the osmolyte formula  He has some cramping with his bowels - some gas  Bowels are explosive and loose and every few days  He has some stool every day and is loose  Not well formed - more " "\"pelletized\"     He is urinating 4-5 times per night     He is up during the night  He has achiness of his hip - he gets up and this helps his hip pain.      He has a new cat that sleeps on his bed and wakes him up -   Feeds her before  Sleeps during the day and up at night   Cat was scared initially when they got the cat from shelter in [place]      Blood pressure has been stable  Anticoagulated   Using flecainide for his heart  Metoprolol      Angelica Rock - wife - bladder surgery and urinary issues - suprapubic catheter     James - son - working in [place] and has house in [facility] [place] [facility]  - trying to get a local job.  Working on boost service.   Gemma - daughter - lives in  [place] and working with [employer] and immigration policy     Blood work today  Vercellannette tomorrow virtual     Bekah Matt appointment cardiology 10/10     Covid Pizer 11/6/2023 - needs to be cancelled as got booster already      Return back 6 months.         Medications      9a   2p  3p 730p  9p   Acetaminophen tylenol 500mg no             Amoxicillin amoxil 500mg dentist             Calcium carbonate tums 500mg no             Carbidopa/levodopa Sinemet 25/100 1    1   Crush 1     Flecainide tambocor 50mg 1       1     Fluticasone flonase 50 mcg/act spray off             Loratadine claritin 10mg  1             Metoprolol tartrate 25mg  1/2 po       1/2 crush     MVI centrum 1             Nonform feeding tube osmolyte   2 cans   2 cans   2 cans   Omeprazole prilosec 20mg 1             Prevident 5000 dry mouth rare             Rosuvastatin crestor 40mg         1     Senna-docusate 8.6-50 no             Starch-maltodextrin thick-it no             Vit D3 cholecalciferol 50 mcg 2000  ?             Warfarin anticoag coumadin 2.5         schedule                         Plan:    Completed PT, SLP and OT and found the OT the most helpful as he had done PT and SLP in the past and had a bit of repetition to it and plateaued from benefit. "     Taking his sinemet by mouth int he morning and swallows the 2nd dose in the afternoon and crushes his evening dose with other medications and puts in the tube.     He has more tremor and use to be just the right hand and now the left hand as well in the evening watching tv  Discussed protein and sinemet interaction and he is presently taking his medication usually 1 hour  before food.   His shaking is before his evening pills which he takes at 8pm. He has some double tapping on his phone and keyboard.     No falls but when he gets up in the middle of the night he is tipsy and he is careful.     He has urinary frequency. Seen urologist last year. Jose G Hawley  He has not been on medication for his bladder  He has not been on mirabegron/myrbetriq  He has not been on gemtesa/ubegron  As best I can determine.     Pharmacy ([facility]) consultation and medication management  Please call the scheduling number @ [phone] to set up an appointment with pharmacists Leslie Clifford or Pricila Saleem.     Flecainide tambocor 50mg  Metoprolol tartrate 25mg   Blood pressure was 105/61 and heart rate was 67 today  He has had some light headedness but his readings are not low from his phone on review today.     6/4/2024 abdominal ultrasound  6/11/2024 return visit to see Dr. Miller and blood work   Transplant 2007  Lymphoma in the past  He is anticoagulated     He has not had choking but has some reflux and is always taste things. He does not have much appetite.   He is using a formula 2 cans 3 times per day  osmolyte   Taking omeprazole in the morning.   He rarely uses the prevident as he has swallowing problems and is easier without anything on his brush  He is getting his teeth cleaned every 4 months  He is taking claritin daily     Bowels are pretty - firm to loose and not taking anything and has variable where some days he is loose and other days he is not having a bowel movement for a few days.     Sleep is stable. He  sleeps separately from his wife who has cpap and a suprapubic and he listens to [facility] radio. He listens to NPR, CBC and [employer]. ?hallucinations - tactile at night vs muscle twitching.     Right hip pain and sleeping on the right side - not having daytime pain  Right leg is weaker.    Angelica Rock - wife - bladder surgery and urinary issues - suprapubic catheter     James - son - working in [place] and has house in [place]  - trying to get a local job.  Working on boost service.   Gemma - daughter - lives in  [place] and working with [employer] and immigration policy    PLAN   Consider taking 1.5 tabs of sinemet at 2pm  Consider parcopa 25/100 1/2 tab as needed in the evening - this dissolves in your mouth. - pharmacist lizeth bocanegra says not available  Pharmacy input - Pharmacy ([facility]) consultation and medication management  Please call the scheduling number @ [phone] to set up an appointment with pharmacists Leslie Clifford or Pricila Saleem.   4. Return appointment  - last visit was 10/9/2023 and return in 6 months.   5. Bladder issues ?mirabegron      Coding statement:   Medical Decision Making:  #  Chronic progressive medical conditions addressed  - see above --   Review and/or interpretation of unique test or documentation from a provider outside of neurology no   Independent historian provided additional details  no I  Prescription drug management and review of potential side effects and/or monitoring for side effects  -- see above ---  Health impacted by social determinants of health  no    I have reviewed the note as documented above.  This accurately captures the substance of my conversation with the patient and total time spent preparing for visit, executing visit and completing visit on the day of the visit:  30 minutes.  The portion of this total time included face to face time 21 minutes    The longitudinal plan of care for Haresh Rock was addressed during this visit. Due to the added  complexity in care, I will continue to support Haresh Rock in the subsequent management of this condition(s) and with the ongoing continuity of care of this condition(s).      Ángel Hill MD     ______________________________________    Last visit date and details:             ______________________________________      Patient was asked about 14 Review of systems including changes in vision (dry eyes, double vision), hearing, heart, lungs, musculoskeletal, depression, anxiety, snoring, RBD, insomnia, urinary frequency, urinary urgency, constipation, swallowing problems, hematological, ID, allergies, skin problems: seborrhea, endocrinological: thyroid, diabetes, cholesterol; balance, weight changes, and other neurological problems and these were not significant at this time except for   Patient Active Problem List   Diagnosis    Tlzvf-lwkiwk-snja disease, chronic (H)    Polyneuropathy    History of bilateral knee replacement    Bone marrow transplant status (H)    Hyperlipidemia LDL goal <70    Chronic rhinitis    Gallstones without obstruction of gallbladder    Long-term (current) use of anticoagulants [Z79.01]    Type 2 diabetes mellitus with stage 2 chronic kidney disease, without long-term current use of insulin (H)    History of Hodgkin's lymphoma    History of irradiation, presenting hazards to health    Hereditary hemochromatosis (H24)    Oropharyngeal dysphagia    Personal history of PE (pulmonary embolism)    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)    Thrombocytopenia (H24)    Paroxysmal atrial fibrillation (H)    Benign prostatic hyperplasia with urinary retention    Constipation    Parkinson's disease (H)    Gastrostomy tube in place (H)    Hammertoe, bilateral          Allergies   Allergen Reactions    Seasonal Allergies      Past Surgical History:   Procedure Laterality Date    ANESTHESIA CARDIOVERSION  04/21/2011    Procedure:ANESTHESIA CARDIOVERSION; Surgeon:GENERIC ANESTHESIA PROVIDER;  Location:[facility] OR    ARTHROSCOPY KNEE RT/LT      LT    CATARACT IOL, RT/LT  2017    COLONOSCOPY  10/16/2012    Procedure: COLONOSCOPY;  Colonoscopy, screening;  Surgeon: Reg Feliciano MD;  Location: [facility] OR    COLONOSCOPY N/A 04/14/2021    Procedure: COLONOSCOPY;  Surgeon: Reg Holm MD;  Location: [facility] GI    ESOPHAGOSCOPY, GASTROSCOPY, DUODENOSCOPY (EGD), COMBINED N/A 10/07/2020    Procedure: ESOPHAGOGASTRODUODENOSCOPY, WITH BIOPSY;  Surgeon: Rual Sawyer DO;  Location: [facility] OR    H ABLATION FOCAL AFIB  03/05/2013    PVI    H ABLATION FOCAL AFIB  01/01/2018    HC BONE MARROW/STEM TRANSPLANT ALLOGENIC  05/08/2007    INSERT PORT VASCULAR ACCESS      IR GASTROSTOMY TUBE PERCUTANEOUS PLCMNT  10/25/2021    JOINT REPLACEMTN, KNEE RT/LT  03/28/2012    SHAWN    LAPAROSCOPY DIAGNOSTIC (GENERAL) N/A 10/29/2021    Procedure: LAPAROSCOPY, DIAGNOSTIC, TAKEDOWN OF MALPOSITIONED GASTROSTOMY TUBE, INSERTION OF GASTROSTOMY TUBE, SMALL BOWEL RESECTION X1, PRIMARY REPAIR OF SMALL BOWEL ENTEROTOMY, ABDOMINAL WASHOUT, TAP BLOCK;  Surgeon: Leif Finney DO;  Location: [facility] OR    PEG TUBE PLACEMENT  10/25/2021    VASECTOMY  1990     Past Medical History:   Diagnosis Date    Abnormal dopamine scan (DaTSCAN) 2020 11/04/2020    [phone] 11/3/2020   Narrative & Impression   Examination: Nuclear medicine DATscan for Dopamine Receptor Localization.   Examination: NM Brain Image Tomographic (SPECT) DATscan   Date: 11/3/2020 3:21 PM   Indication: Parkinsonism   Comparison: None   Additional Information: none   Interfering Medications: None   Technique:   The patient initially received 1 ml of Lugol's solution orally prior    Antiphospholipid antibody syndrome (H24)     Ravi's, noted negative per testing re-done in 2008 in hematology note 01/13/2009    Articulation disorder 09/23/2020    Atrial fibrillation (H) 04/2011    Benign prostatic hyperplasia with urinary retention     Cirrhosis (H)     CKD (chronic kidney  disease) stage 2, GFR 60-89 ml/min     Diabetes mellitus type II     steroid induced    DJD (degenerative joint disease) of knee     SHAWN, worse on right    DVT (deep venous thrombosis) (H)     ED (erectile dysfunction)     Gallbladder polyp 05/2010    Cmehq-Rrzplu-Zkom Disease 2007    BMT    Heart failure with preserved ejection fraction, NYHA class I (H)     Hemochromatosis     Hodgkin's disease NOS     5 cycles of ABVD in 2011    HTN (hypertension)     Hyperlipidaemia LDL goal <100     Multiple thyroid nodules     benign     Myeloproliferative disorder (H)     atypical, [facility]    Parkinson disease 09/24/2020    PE (pulmonary embolism) 11/2004    Polyneuropathy     Pressure ulcer     Primary pulmonary hypertension (H)     Severe protein-calorie malnutrition (H24) 11/10/2021    Small bowel obstruction (H) 10/29/2021    Thrombocytopenia (H24) 06/08/2021    Thyroid nodule 05/17/2016     Social History     Socioeconomic History    Marital status:      Spouse name: Angelica    Number of children: 2    Years of education: 21    Highest education level: Not on file   Occupational History    Occupation:      Employer: [employer]     Employer: [employer]   Tobacco Use    Smoking status: Never    Smokeless tobacco: Never   Substance and Sexual Activity    Alcohol use: No    Drug use: No    Sexual activity: Not Currently     Partners: Female   Other Topics Concern    Parent/sibling w/ CABG, MI or angioplasty before 65F 55M? No   Social History Narrative     about 50 yrs        Wife has some health issues - back pain - second knee replaced    She had gastric bypass and a fib and bad mitral valve        Son in [place]    Daughter in [facility] with 8 yo son.         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[employer]        Born and raised in [place]              Social Determinants of Health     Financial Resource Strain: Low Risk  (11/28/2023)    Financial Resource Strain     Within the  past 12 months, have you or your family members you live with been unable to get utilities (heat, electricity) when it was really needed?: No   Food Insecurity: Low Risk  (11/28/2023)    Food Insecurity     Within the past 12 months, did you worry that your food would run out before you got money to buy more?: No     Within the past 12 months, did the food you bought just not last and you didn t have money to get more?: No   Transportation Needs: Low Risk  (11/28/2023)    Transportation Needs     Within the past 12 months, has lack of transportation kept you from medical appointments, getting your medicines, non-medical meetings or appointments, work, or from getting things that you need?: No   Physical Activity: Not on file   Stress: Not on file   Social Connections: Not on file   Interpersonal Safety: Not on file   Housing Stability: Low Risk  (11/28/2023)    Housing Stability     Do you have housing? : Yes     Are you worried about losing your housing?: No       Drug and lactation database from the [place] National Library of Medicine:  http://toxnet.nlm.nih.gov/cgi-bin/sis/htmlgen?LACT      B/P: Data Unavailable, T: Data Unavailable, P: Data Unavailable, R: Data Unavailable 0 lbs 0 oz  There were no vitals taken for this visit., There is no height or weight on file to calculate BMI.  Medications and Vitals not listed above were documented in the cart and reviewed by me.     Current Outpatient Medications   Medication Sig Dispense Refill    amoxicillin (AMOXIL) 500 MG capsule Take 2,000 mg by mouth once Take 1 hour prior to dental procedure      carbidopa-levodopa (SINEMET)  MG tablet 1 tab 3/day at 8am, 2pm and 8pm 270 tablet 3    flecainide (TAMBOCOR) 50 MG tablet TAKE 1 TABLET TWICE A  tablet 2    loratadine (CLARITIN REDITABS) 10 MG ODT Take 10 mg by mouth daily      metoprolol tartrate (LOPRESSOR) 25 MG tablet TAKE ONE-HALF (1/2) TABLET (12.5 MG) ORALLY OR BY FEEDING TUBE TWICE A DAY  (CRUSH TABLET) 90 tablet 1    NONFORMULARY Abbott osmolyte feedings 2 cans 3/day      omeprazole (PRILOSEC) 20 MG DR capsule TAKE 1 CAPSULE EVERY MORNING 90 capsule 1    PREVIDENT 5000 DRY MOUTH 1.1 % GEL topical gel Apply to affected area daily as needed Or brushes with water  3    rosuvastatin (CRESTOR) 40 MG tablet TAKE 1 TABLET AT BEDTIME 90 tablet 3    warfarin ANTICOAGULANT (COUMADIN) 2.5 MG tablet Take 2.5 mg Tues, Thurs, Sat and 3.75 mg all other days, or as directed by the Coumadin Clinic 110 tablet 1         Ángel Hill MD

## 2024-04-12 ENCOUNTER — DOCUMENTATION ONLY (OUTPATIENT)
Dept: ANTICOAGULATION | Facility: CLINIC | Age: 74
End: 2024-04-12
Payer: COMMERCIAL

## 2024-04-12 DIAGNOSIS — Z79.01 LONG TERM CURRENT USE OF ANTICOAGULANT THERAPY: Primary | ICD-10-CM

## 2024-04-12 DIAGNOSIS — I48.0 PAROXYSMAL ATRIAL FIBRILLATION (H): ICD-10-CM

## 2024-04-12 DIAGNOSIS — Z86.711 PERSONAL HISTORY OF PE (PULMONARY EMBOLISM): ICD-10-CM

## 2024-04-12 LAB — INR HOME MONITORING: 2.1 RATIO (ref 2–3)

## 2024-04-12 NOTE — PROGRESS NOTES
ANTICOAGULATION  MANAGEMENT-Home Monitor Managed by Exception    Haresh EMILIE Rock 73 year old male is on warfarin with therapeutic INR result. (Goal INR 2.0-3.0)    Recent labs: (last 7 days)     04/12/24  1259   INR 2.1       Previous INR was Therapeutic  Medication, diet, health changes since last INR:chart reviewed; none identified  Contacted within the last 12 weeks by phone on 2/26/24  Last [facility] referral date: 07/03/2023      MINNIE Hutson was NOT contacted regarding therapeutic result today per home monitoring policy manage by exception agreement.   Current warfarin dose is to be continued:     Summary  As of 4/12/2024      Full warfarin instructions:  3.75 mg every Sun, Tue, Fri; 2.5 mg all other days   Next INR check:  4/19/2024             ?   Medina Salmeron RN  Anticoagulation Clinic  4/12/2024    _______________________________________________________________________     Anticoagulation Episode Summary       Current INR goal:  2.0-3.0   TTR:  85.1% (1 y)   Target end date:  Indefinite   Send INR reminders to:  [facility]    Indications    Long-term (current) use of anticoagulants [Z79.01] [Z79.01]  Atrial fibrillation (H) (Resolved) [I48.91]  Antiphospholipid antibody syndrome (H24) (Resolved) [D68.61]  Personal history of DVT (deep vein thrombosis) (Resolved) [Z86.718]  Atrial fibrillation  unspecified type (H) (Resolved) [I48.91]  Paroxysmal atrial fibrillation (H) [I48.0]  Chronic atrial fibrillation (H) (Resolved) [I48.20]  Personal history of PE (pulmonary embolism) [Z86.711]             Comments:  JESSICA rodas to manage by exception             Anticoagulation Care Providers       Provider Role Specialty Phone number    Anya Nowak MD Referring Family Medicine [phone]    Elizabeth Balderas MD Referring HOSPITALIST [phone]

## 2024-04-19 ENCOUNTER — DOCUMENTATION ONLY (OUTPATIENT)
Dept: ANTICOAGULATION | Facility: CLINIC | Age: 74
End: 2024-04-19
Payer: COMMERCIAL

## 2024-04-19 ENCOUNTER — PATIENT OUTREACH (OUTPATIENT)
Dept: CARE COORDINATION | Facility: CLINIC | Age: 74
End: 2024-04-19
Payer: COMMERCIAL

## 2024-04-19 DIAGNOSIS — I48.0 PAROXYSMAL ATRIAL FIBRILLATION (H): ICD-10-CM

## 2024-04-19 DIAGNOSIS — Z86.711 PERSONAL HISTORY OF PE (PULMONARY EMBOLISM): ICD-10-CM

## 2024-04-19 DIAGNOSIS — Z79.01 LONG TERM CURRENT USE OF ANTICOAGULANT THERAPY: Primary | ICD-10-CM

## 2024-04-19 LAB — INR HOME MONITORING: 2.3 RATIO (ref 2–3)

## 2024-04-19 NOTE — PROGRESS NOTES
Clinic Care Coordination Contact  Care Coordination Clinician Chart Review    Situation: Patient chart reviewed by Care Coordinator.       Background: Care Coordination Program started: 11/5/2021. Initial assessment completed and patient-centered care plan(s) were developed with participation from patient. Lead CC handed patient off to CHW for continued outreaches.       Assessment: Per chart review, patient outreach completed by CC CHW on 3/19/24.  Patient is actively working to accomplish goal(s). Patient's goal(s) appropriate and relevant at this time. Patient is not due for updated Plan of Care.  Assessments will be completed annually or as needed/with change of patient status.      Care Plan: General  Work on increasing strength and stamina for a year after reaching 100%       Problem: HP GENERAL PROBLEM       Goal: General  work on walking without the walker, and increasing my strength and stamina.  For at least 1 year after reaching 100%.       Start Date: 12/13/2021 Expected End Date: 6/13/2024    This Visit's Progress: 50% Recent Progress: 40%    Note:     Barriers: suffers from parkinson's  Strengths: engaged in care coordination  Patient expressed understanding of goal: yes  Action steps to achieve this goal:  1. I will continue walking without the walker. No longer using the walker as of 3/15/22. Completed action step  2. I will go outside without the walker when I feel strong enough and have increased my strength and stamina.  Continue to work on for at least a year.  3. I will frequently walk around inside the house to increase my strength and stamina.  Continue to work on for at least a year.                            Care Plan: General - Working on balance and items in the path,       Problem: HP GENERAL PROBLEM       Goal: General Goal - work on balance and avoiding items in the path       Start Date: 10/28/2022 Expected End Date: 10/28/2024    This Visit's Progress: 40% Recent Progress: 30%     Note:     Barriers: parkinson's  Strengths: engaged in care coordination  Patient expressed understanding of goal: yes   Action steps to achieve this goal:  1. I will work on walking around items in my path without losing my balance.  2. I will work on maintaining my balance when I walk through the house.  3. I will use my walker or cane as needed to maintain my balance.                                   Plan/Recommendations: The patient will continue working with Care Coordination to achieve goal(s) as above. CHW will continue outreaches at minimum every 30 days and will involve Lead CC as needed or if patient is ready to move to Maintenance. Lead CC will continue to monitor CHW outreaches and patient's progress to goal(s) every 6 weeks.     Plan of Care updated and sent to patient: Sruthi.            Uriel Zhou MSN, RN, PHN, CCM   Primary Care Clinical RN Care Coordinator  [facility]  4/19/2024   2:42 PM  [email or web address]  Office: [phone]

## 2024-04-19 NOTE — PROGRESS NOTES
ANTICOAGULATION  MANAGEMENT-Home Monitor Managed by Exception    Haresh EMILIE Rock 73 year old male is on warfarin with therapeutic INR result. (Goal INR 2.0-3.0)    Recent labs: (last 7 days)     04/19/24  1521   INR 2.3       Previous INR was Therapeutic  Medication, diet, health changes since last INR:chart reviewed; none identified  Contacted within the last 12 weeks by phone on 2/26/24  Last [facility] referral date: 07/03/2023      MINNIE Hutson was NOT contacted regarding therapeutic result today per home monitoring policy manage by exception agreement.   Current warfarin dose is to be continued:     Summary  As of 4/19/2024      Full warfarin instructions:  3.75 mg every Sun, Tue, Fri; 2.5 mg all other days   Next INR check:  4/26/2024             ?   Shanice Dhillon RN  Anticoagulation Clinic  4/19/2024    _______________________________________________________________________     Anticoagulation Episode Summary       Current INR goal:  2.0-3.0   TTR:  85.1% (1 y)   Target end date:  Indefinite   Send INR reminders to:  [facility]    Indications    Long-term (current) use of anticoagulants [Z79.01] [Z79.01]  Atrial fibrillation (H) (Resolved) [I48.91]  Antiphospholipid antibody syndrome (H24) (Resolved) [D68.61]  Personal history of DVT (deep vein thrombosis) (Resolved) [Z86.718]  Atrial fibrillation  unspecified type (H) (Resolved) [I48.91]  Paroxysmal atrial fibrillation (H) [I48.0]  Chronic atrial fibrillation (H) (Resolved) [I48.20]  Personal history of PE (pulmonary embolism) [Z86.711]             Comments:  JESSICA rodas to manage by exception             Anticoagulation Care Providers       Provider Role Specialty Phone number    Anya Nowak MD Referring Family Medicine [phone]    Elizabeth Balderas MD Referring HOSPITALIST [phone]

## 2024-04-24 ENCOUNTER — PATIENT OUTREACH (OUTPATIENT)
Dept: CARE COORDINATION | Facility: CLINIC | Age: 74
End: 2024-04-24
Payer: COMMERCIAL

## 2024-04-24 NOTE — PROGRESS NOTES
Clinic Care Coordination Contact  Community Health Worker Follow Up    Care Gaps:     Health Maintenance Due   Topic Date Due    ZOSTER IMMUNIZATION (1 of 2) 11/14/2016    IPV IMMUNIZATION (3 of 3 - Adult catch-up series) 03/19/2017    DTAP/TDAP/TD IMMUNIZATION (2 - Td or Tdap) 09/11/2022    DIABETIC FOOT EXAM  10/11/2023    COVID-19 Vaccine (8 - 2023-24 season) 11/28/2023    EYE EXAM  02/13/2024    CBC  04/30/2024       Patient will call to schedule eye exam and discuss other care gaps at next PCP visit.     Care Plan:   Care Plan: General  Work on increasing strength and stamina for a year after reaching 100%       Problem: HP GENERAL PROBLEM       Goal: General  work on walking without the walker, and increasing my strength and stamina.  For at least 1 year after reaching 100%.       Start Date: 12/13/2021 Expected End Date: 6/13/2024    This Visit's Progress: 100% Recent Progress: 50%    Note:     Barriers: suffers from parkinson's  Strengths: engaged in care coordination  Patient expressed understanding of goal: yes  Action steps to achieve this goal:  1. I will continue walking without the walker. No longer using the walker as of 3/15/22. Completed action step  2. I will go outside without the walker when I feel strong enough and have increased my strength and stamina.  Continue to work on for at least a year.  3. I will frequently walk around inside the house to increase my strength and stamina.  Continue to work on for at least a year.                            Care Plan: General - Working on balance and items in the path,       Problem: HP GENERAL PROBLEM       Goal: General Goal - work on balance and avoiding items in the path       Start Date: 10/28/2022 Expected End Date: 10/28/2024    This Visit's Progress: 100% Recent Progress: 40%    Note:     Barriers: parkinson's  Strengths: engaged in care coordination  Patient expressed understanding of goal: yes   Action steps to achieve this goal:  1. I will  work on walking around items in my path without losing my balance.  2. I will work on maintaining my balance when I walk through the house.  3. I will use my walker or cane as needed to maintain my balance.                                Intervention and Education during outreach: Patient states that he has completed PT, OT and speech therapies. He continues to do the exercises at home and is walking on the treadmill 3-4 times a week.     Patient feels like he is ready to go on maintenance.    CHW Plan: CHW will request chart review to move to maintenance.    Next outreach due:  6/24/24

## 2024-04-26 ENCOUNTER — DOCUMENTATION ONLY (OUTPATIENT)
Dept: ANTICOAGULATION | Facility: CLINIC | Age: 74
End: 2024-04-26
Payer: COMMERCIAL

## 2024-04-26 DIAGNOSIS — Z86.711 PERSONAL HISTORY OF PE (PULMONARY EMBOLISM): ICD-10-CM

## 2024-04-26 DIAGNOSIS — Z79.01 LONG TERM CURRENT USE OF ANTICOAGULANT THERAPY: Primary | ICD-10-CM

## 2024-04-26 DIAGNOSIS — I48.0 PAROXYSMAL ATRIAL FIBRILLATION (H): ICD-10-CM

## 2024-04-26 LAB — INR HOME MONITORING: 2.7 RATIO (ref 2–3)

## 2024-04-26 NOTE — PROGRESS NOTES
ANTICOAGULATION  MANAGEMENT-Home Monitor Managed by Exception    Haresh EMILIE Rock 73 year old male is on warfarin with therapeutic INR result. (Goal INR 2.0-3.0)    Recent labs: (last 7 days)     04/26/24  1234   INR 2.7       Previous INR was Therapeutic  Medication, diet, health changes since last INR:chart reviewed; none identified  Contacted within the last 12 weeks by phone on 2/26/24  Last [facility] referral date: 07/03/2023      MINNIE Hutson was NOT contacted regarding therapeutic result today per home monitoring policy manage by exception agreement.   Current warfarin dose is to be continued:     Summary  As of 4/26/2024      Full warfarin instructions:  3.75 mg every Sun, Tue, Fri; 2.5 mg all other days   Next INR check:  5/3/2024             ?   Shanice Dhillon RN  Anticoagulation Clinic  4/26/2024    _______________________________________________________________________     Anticoagulation Episode Summary       Current INR goal:  2.0-3.0   TTR:  85.1% (1 y)   Target end date:  Indefinite   Send INR reminders to:  [facility]    Indications    Long-term (current) use of anticoagulants [Z79.01] [Z79.01]  Atrial fibrillation (H) (Resolved) [I48.91]  Antiphospholipid antibody syndrome (H24) (Resolved) [D68.61]  Personal history of DVT (deep vein thrombosis) (Resolved) [Z86.718]  Atrial fibrillation  unspecified type (H) (Resolved) [I48.91]  Paroxysmal atrial fibrillation (H) [I48.0]  Chronic atrial fibrillation (H) (Resolved) [I48.20]  Personal history of PE (pulmonary embolism) [Z86.711]             Comments:  JESSICA rodas to manage by exception             Anticoagulation Care Providers       Provider Role Specialty Phone number    Anya Nowak MD Referring Family Medicine [phone]    Elizabeth Balderas MD Referring HOSPITALIST [phone]

## 2024-04-29 ENCOUNTER — OFFICE VISIT (OUTPATIENT)
Dept: NEUROLOGY | Facility: CLINIC | Age: 74
End: 2024-04-29
Payer: COMMERCIAL

## 2024-04-29 VITALS
BODY MASS INDEX: 25.06 KG/M2 | HEIGHT: 71 IN | SYSTOLIC BLOOD PRESSURE: 105 MMHG | WEIGHT: 179 LBS | HEART RATE: 67 BPM | DIASTOLIC BLOOD PRESSURE: 61 MMHG

## 2024-04-29 DIAGNOSIS — G20.A1 PARKINSON'S DISEASE, UNSPECIFIED WHETHER DYSKINESIA PRESENT, UNSPECIFIED WHETHER MANIFESTATIONS FLUCTUATE (H): Primary | ICD-10-CM

## 2024-04-29 PROCEDURE — G2211 COMPLEX E/M VISIT ADD ON: HCPCS | Performed by: PSYCHIATRY & NEUROLOGY

## 2024-04-29 PROCEDURE — 99214 OFFICE O/P EST MOD 30 MIN: CPT | Performed by: PSYCHIATRY & NEUROLOGY

## 2024-04-29 RX ORDER — CARBIDOPA AND LEVODOPA 25; 100 MG/1; MG/1
TABLET ORAL
Qty: 270 TABLET | Refills: 3 | Status: SHIPPED | OUTPATIENT
Start: 2024-04-29 | End: 2024-04-29

## 2024-04-29 RX ORDER — CARBIDOPA AND LEVODOPA 25; 100 MG/1; MG/1
TABLET ORAL
Qty: 360 TABLET | Refills: 3 | Status: SHIPPED | OUTPATIENT
Start: 2024-04-29 | End: 2024-07-24

## 2024-04-29 RX ORDER — CARBIDOPA AND LEVODOPA 25; 100 MG/1; MG/1
TABLET, ORALLY DISINTEGRATING ORAL
Qty: 30 TABLET | Refills: 11 | Status: SHIPPED | OUTPATIENT
Start: 2024-04-29 | End: 2024-05-29

## 2024-04-29 ASSESSMENT — PAIN SCALES - GENERAL: PAINLEVEL: MILD PAIN (2)

## 2024-04-29 NOTE — NURSING NOTE
"Haresh Rock's goals for this visit include:   Chief Complaint   Patient presents with    RECHECK     Parkinson's disease, unspecified whether dyskinesia present, unspecified whether  manifestations fluctuate // 6 month follow up           He requests these members of his care team be copied on today's visit information: yes    PCP: Anya Nowak    Referring Provider:  No referring provider defined for this encounter.    /61   Pulse 67   Ht 1.803 m (5' 11\")   Wt 81.2 kg (179 lb)   BMI 24.97 kg/m      Do you need any medication refills at today's visit? No  JUAN Pritchard, CMA ([facility])      "

## 2024-04-29 NOTE — LETTER
2024         RE: Haresh Rock  [address] [place] [address]        Dear Colleague,    Thank you for referring your patient, Haresh Rock, to the [facility] [place]. Please see a copy of my visit note below.        Diagnosis/Summary/Recommendations:    PATIENT: Haresh Rock  73 year old male     : 1950    KEN: 2024    MRN: [id number]  [address] [place] [address]  [phone] (M)  [phone] (H)  [email or web address]     - sheyla Ramirez - son  Gemma - daughter  Sheyla Rock  [phone]     [email or web address]  [email or web address]     [phone]     Sitting in a lazy boy     [phone]     Assessment:  (G20) Parkinson disease (H)  (primary encounter diagnosis)     Dopamine scan - (datscan)  11/3/2020  A presynaptic dopaminergic deficit is demonstrated bilaterally.     Review of diagnosis    parkinson  Avoidance of dopamine blockers yes  Motor complication review  -   Review of Impulse control disorders no  Review of surgical or medication options yes     Avoidance of dopamine blockers   Not ta josiane     Motor complication review   no     Review of Impulse control disorders   denies     Review of surgical or medication options   reviewed     Gait/Balance/Falls   No falls     Exercise/Therapy performed/offered   Doing shoveling snow     Cognitive/Driving   Driving - not problems  No cognitive problems - no changes        Mood   Daughter gemma with bipolar - 2 or 3 miles away   Son july with mood issues  2 or 3 miles away   He has some depression     Living with Sheyla - has ongoing back pain - getting a tooth extracted this afternoon;  Constipation, heart and bladder issues.   Constipation or diarrhea - has problems controlling her bowels     Hallucinations/delusions   No hallucinations     Sleep   Pseudoeph-doxylamine DM APAP nyquil - not using  Sleep pretty well  Nocturia 2/noc  Falls asleep during the day  Napping during  the day  Falls asleep when watching news  Falls asleep during the afternoon  Up 3/noc for bathroom   Midnight goes to bed and up at 8am  Naps during day   Not talking in sleep or snoring.  No unusual behaviors  Wakes up before 3am, 5/6a and 7am     Bladder/Renal/Prostate/Gyn/Other  Renal function.   May have stage 2 disease.      Prostate - denies problems. He has has nocturia couple times per night.   Denies elevated psa     Prostate surgery; urinating better     Dutasteride avodart 0.5mg - off   Tamsulosin flomax 0.4mg - stopped  Had low blood pressure and fluids     July 5th ED visit and got catheter  Surgery yesterday laser and should get catheter out  Dr Jose G Hawley     GI/Constipation/GERD   Swallow study 9/22/2020  1. Multiple events of deep laryngeal penetration with thinner barium consistencies with intermittent aspiration.      Gallstones but has not gallbladder removed.  Had a gallbladder attack x 1 and is not on actigall     Bisacodyl dulcolax 5mg EC tablet - not using   Calcium carbonate tums 500mg ?  Nutritional supplement - 1 bottle per day  - not using   Pantoprazole protonix 20mg - not using  Polythyelene glycol miralax - not using   Prevident 5000 dry mouth - using  Senna-docusate senexon-S senokot-S  - no  Weight loss better with feeding tube  No constipation  Has some seepage  When urinates he has some stool     Had bloating and blockage with the feeding tube placement and had emergency surgery   10/29/2021 Scan showed problems     Feeding tube has helped him gain 20 lbs - he gets feedings 1 hour after medications so 9am, 3pm and 9pm     Takes parkinson medications by mouth for the first two doses and crush the last one of the day through the feeding tube  Schedule is 8am, 2pm and 8pm     With his fecal seepage discussed the use of a bidet or adapting his toilet with one - h e has a raised toilet seat so not clear what needs to be done. Consider seeing Occupational therapy.      Formula switched  from crain to nestle  161 or so lbs        ENDO/Lipid/DM/Bone density/Thyroid  Cholesterol  Rosuvastatin crestor 40mg   Vitamin D3 cholecalciferol 50 mcg 2000 units  Had diabetes in the past from prednisone  And this may have resolved  May have a thyroid nodule.  TSH was normal.   A1c was 5.5         Cardio/heart/Hyper or Hypotensive   Flecainide tambocor 50mg twice daily  Metoprolol succinate ER Toprol XL 25mg 24 hr tablet  Blood pressure has been okay   122/63  Low bp has cleared up  MRI of heart next week     Vision/Dry Eyes/Cataracts/Glaucoma/Macular   Wears glasses  Eye - left eye has been poor since age 2 due to a lazy eye - amblyopia  Right eye post cataract surgery had an artificial lens put in and does not wear glasses till the evening         Heme/Anticoagulation/Antiplatelet/Anemia/Other  Folic acid folvite 1mg - not taking   Anticoagulated  Warfarin     Hereditary hemochromatosis gene - gene that increases his risk and has not had problems with this. Had had phlebotomies x 2 in the past.      AYUSH Torres     History of pulmonary embolii - back in 2004. This was related to blood clotting problems.   May have had a DVT. Reportedly has had antiphospholipid antibody. He is on coumadin.      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 Had a transplant from Dr. 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  No problems since then.      Graft vs host     He has high sed rate and will be seeing ID next week   crp was 12.8  Sed rate was 90  8/3/2021  Working with Marcelo Jacques     ENT/Resp  Hodgkin's lymphoma in the past 2011 and had chemotherapy for this and followed with CT scan and reportedly this is gone.      Pulmonary hypertension in the past.   Fluticasone flonase 50 mcg/act nasal spray  Loratadine claritin 10mg    CT of lung next      Hearing. Feels he has wax in his left ear and partial problem with the right ear. It is variable problem. Has used ear wax  removal.      Feeding tube has helped him gain 20 lbs - he gets feedings 1 hour after medications so 9am, 3pm and 9pm     Skin/Cancer/Seborrhea/other  No skin cancer     Musculoskeletal/Pain/Headache  bilateral knee replacements.     Other:  He reports a neuropathy and that his foot drop has resolved. 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     No constipation - bowels are a bit loose  Getting feedings - using nestle product  He has reflux -  Has a pillow but not a pillow wedge  He has to brush his teeth several times daily  Avoiding water pik because of difficulty handling secretions   Teeth gets crusty - tartar     May want to talk with his pcp about the omeprazole - should he be taking a different product or/and higher dose  Famotidine (pepcid) or Pantoprazole (protonix)     Overall his parkinson is stable  No falls  Refilled his sinemet that is from [facility]     Ongoing nocturia - had tried medication in the past that did not help  Not sure if he has been on mirabegron/myrbetriq, ubegron/gemtesa  Has seen Dr. Hawley jan 2023   Benign prostatic hyperplasia with urinary retention  (primary encounter diagnosis)  Comment: Bladder scan showed minimal residual urine today.  (N45.1) Epididymitis     Blood pressure has been stable  Anticoagulated   Using flecainide for his heart     Ongoing hematological care with Dr. JUNIOR    Not exercising  No falls  He arrives today by himself  He had an ulcer left foot - was wearing a boot and was off balance and gave up on it and was healed   He has a neuropathy and had a stick that stuck there that he was unaware of - and a woman who does his toes identified it and sent him in to see his doctor     He has not noticed wearing off - taking his medication 3/day      Ordered big and loud t herapy =      Swallowing - he had some problems with tylenol caplet and coughed it up as he had problems swallowing it.   He got one tylenol down and was not able to get the  "other pill down  He has a lot of  \"reflux\" due to the osmolyte formula  He has some cramping with his bowels - some gas  Bowels are explosive and loose and every few days  He has some stool every day and is loose  Not well formed - more \"pelletized\"     He is urinating 4-5 times per night     He is up during the night  He has achiness of his hip - he gets up and this helps his hip pain.      He has a new cat that sleeps on his bed and wakes him up -   Feeds her before  Sleeps during the day and up at night   Cat was scared initially when they got the cat from shelter in [place]      Blood pressure has been stable  Anticoagulated   Using flecainide for his heart  Metoprolol      Angelica Rock - wife - bladder surgery and urinary issues - suprapubic catheter     James - son - working in [place] and has house in [facility]  - trying to get a local job.  Working on [employer] service.   Gemma - daughter - lives in  [place] and working with [employer] and immigration policy     Blood work today  Verradha tomorrow virtual     Bekah Matt appointment cardiology 10/10     Covtra Shah 11/6/2023 - needs to be cancelled as got booster already      Return back 6 months.         Medications      9a   2p  3p 730p  9p   Acetaminophen tylenol 500mg no             Amoxicillin amoxil 500mg dentist             Calcium carbonate tums 500mg no             Carbidopa/levodopa Sinemet 25/100 1    1   Crush 1     Flecainide tambocor 50mg 1       1     Fluticasone flonase 50 mcg/act spray off             Loratadine claritin 10mg  1             Metoprolol tartrate 25mg  1/2 po       1/2 crush     MVI centrum 1             Nonform feeding tube osmolyte   2 cans   2 cans   2 cans   Omeprazole prilosec 20mg 1             Prevident 5000 dry mouth rare             Rosuvastatin crestor 40mg         1     Senna-docusate 8.6-50 no             Starch-maltodextrin thick-it no             Vit D3 cholecalciferol 50 mcg 2000  ?           "   Warfarin anticoag coumadin 2.5         schedule                         Plan:    Completed PT, SLP and OT and found the OT the most helpful as he had done PT and SLP in the past and had a bit of repetition to it and plateaued from benefit.     Taking his sinemet by mouth int he morning and swallows the 2nd dose in the afternoon and crushes his evening dose with other medications and puts in the tube.     He has more tremor and use to be just the right hand and now the left hand as well in the evening watching tv  Discussed protein and sinemet interaction and he is presently taking his medication usually 1 hour  before food.   His shaking is before his evening pills which he takes at 8pm. He has some double tapping on his phone and keyboard.     No falls but when he gets up in the middle of the night he is tipsy and he is careful.     He has urinary frequency. Seen urologist last year. Jose G Hawley  He has not been on medication for his bladder  He has not been on mirabegron/myrbetriq  He has not been on gemtesa/ubegron  As best I can determine.     Pharmacy ([facility]) consultation and medication management  Please call the scheduling number @ [phone] to set up an appointment with pharmacists Leslie Clifford or Pricila Saleem.     Flecainide tambocor 50mg  Metoprolol tartrate 25mg   Blood pressure was 105/61 and heart rate was 67 today  He has had some light headedness but his readings are not low from his phone on review today.     6/4/2024 abdominal ultrasound  6/11/2024 return visit to see Dr. Miller and blood work   Transplant 2007  Lymphoma in the past  He is anticoagulated     He has not had choking but has some reflux and is always taste things. He does not have much appetite.   He is using a formula 2 cans 3 times per day  osmolyte   Taking omeprazole in the morning.   He rarely uses the prevident as he has swallowing problems and is easier without anything on his brush  He is getting his teeth  cleaned every 4 months  He is taking claritin daily     Bowels are pretty - firm to loose and not taking anything and has variable where some days he is loose and other days he is not having a bowel movement for a few days.     Sleep is stable. He sleeps separately from his wife who has cpap and a suprapubic and he listens to [facility] radio. He listens to NPR, CBC and [employer]. ?hallucinations - tactile at night vs muscle twitching.     Right hip pain and sleeping on the right side - not having daytime pain  Right leg is weaker.    Angelica Rock - wife - bladder surgery and urinary issues - suprapubic catheter     James - son - working in [place] and has house in [facility] [place] [facility]  - trying to get a local job.  Working on boost service.   Gemma - daughter - lives in  [place] and working with [employer] and immigration policy    PLAN   Consider taking 1.5 tabs of sinemet at 2pm  Consider parcopa 25/100 1/2 tab as needed in the evening - this dissolves in your mouth. - pharmacist lizeth bocanegra says not available  Pharmacy input - Pharmacy ([facility]) consultation and medication management  Please call the scheduling number @ [phone] to set up an appointment with pharmacists Leslie Clifford or Pricila Saleem.   4. Return appointment  - last visit was 10/9/2023 and return in 6 months.   5. Bladder issues ?mirabegron      Coding statement:   Medical Decision Making:  #  Chronic progressive medical conditions addressed  - see above --   Review and/or interpretation of unique test or documentation from a provider outside of neurology no   Independent historian provided additional details  no I  Prescription drug management and review of potential side effects and/or monitoring for side effects  -- see above ---  Health impacted by social determinants of health  no    I have reviewed the note as documented above.  This accurately captures the substance of my conversation with the patient and total time spent preparing for  visit, executing visit and completing visit on the day of the visit:  30 minutes.  The portion of this total time included face to face time 21 minutes    The longitudinal plan of care for Haresh Rock was addressed during this visit. Due to the added complexity in care, I will continue to support Haresh Rock in the subsequent management of this condition(s) and with the ongoing continuity of care of this condition(s).      Ángel Hill MD     ______________________________________    Last visit date and details:             ______________________________________      Patient was asked about 14 Review of systems including changes in vision (dry eyes, double vision), hearing, heart, lungs, musculoskeletal, depression, anxiety, snoring, RBD, insomnia, urinary frequency, urinary urgency, constipation, swallowing problems, hematological, ID, allergies, skin problems: seborrhea, endocrinological: thyroid, diabetes, cholesterol; balance, weight changes, and other neurological problems and these were not significant at this time except for   Patient Active Problem List   Diagnosis     Yllbj-pfnune-isls disease, chronic (H)     Polyneuropathy     History of bilateral knee replacement     Bone marrow transplant status (H)     Hyperlipidemia LDL goal <70     Chronic rhinitis     Gallstones without obstruction of gallbladder     Long-term (current) use of anticoagulants [Z79.01]     Type 2 diabetes mellitus with stage 2 chronic kidney disease, without long-term current use of insulin (H)     History of Hodgkin's lymphoma     History of irradiation, presenting hazards to health     Hereditary hemochromatosis (H24)     Oropharyngeal dysphagia     Personal history of PE (pulmonary embolism)     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)     Thrombocytopenia (H24)     Paroxysmal atrial fibrillation (H)     Benign prostatic hyperplasia with urinary retention     Constipation     Parkinson's disease (H)     Gastrostomy tube in  place (H)     Hammertoe, bilateral          Allergies   Allergen Reactions     Seasonal Allergies      Past Surgical History:   Procedure Laterality Date     ANESTHESIA CARDIOVERSION  04/21/2011    Procedure:ANESTHESIA CARDIOVERSION; Surgeon:GENERIC ANESTHESIA PROVIDER; Location:[facility] OR     ARTHROSCOPY KNEE RT/LT      LT     CATARACT IOL, RT/LT  2017     COLONOSCOPY  10/16/2012    Procedure: COLONOSCOPY;  Colonoscopy, screening;  Surgeon: Reg Feliciano MD;  Location: [facility] OR     COLONOSCOPY N/A 04/14/2021    Procedure: COLONOSCOPY;  Surgeon: Reg Holm MD;  Location: [facility] GI     ESOPHAGOSCOPY, GASTROSCOPY, DUODENOSCOPY (EGD), COMBINED N/A 10/07/2020    Procedure: ESOPHAGOGASTRODUODENOSCOPY, WITH BIOPSY;  Surgeon: Raul Sawyer DO;  Location: [facility] OR     H ABLATION FOCAL AFIB  03/05/2013    PVI     H ABLATION FOCAL AFIB  01/01/2018     HC BONE MARROW/STEM TRANSPLANT ALLOGENIC  05/08/2007     INSERT PORT VASCULAR ACCESS       IR GASTROSTOMY TUBE PERCUTANEOUS PLCMNT  10/25/2021     JOINT REPLACEMTN, KNEE RT/LT  03/28/2012    SHAWN     LAPAROSCOPY DIAGNOSTIC (GENERAL) N/A 10/29/2021    Procedure: LAPAROSCOPY, DIAGNOSTIC, TAKEDOWN OF MALPOSITIONED GASTROSTOMY TUBE, INSERTION OF GASTROSTOMY TUBE, SMALL BOWEL RESECTION X1, PRIMARY REPAIR OF SMALL BOWEL ENTEROTOMY, ABDOMINAL WASHOUT, TAP BLOCK;  Surgeon: Leif Finney DO;  Location: [facility] OR     PEG TUBE PLACEMENT  10/25/2021     VASECTOMY  1990     Past Medical History:   Diagnosis Date     Abnormal dopamine scan (DaTSCAN) 2020 11/04/2020    [phone] 11/3/2020   Narrative & Impression   Examination: Nuclear medicine DATscan for Dopamine Receptor Localization.   Examination: NM Brain Image Tomographic (SPECT) DATscan   Date: 11/3/2020 3:21 PM   Indication: Parkinsonism   Comparison: None   Additional Information: none   Interfering Medications: None   Technique:   The patient initially received 1 ml of Lugol's solution orally prior      Antiphospholipid antibody syndrome (H24)     Ravi's, noted negative per testing re-done in 2008 in hematology note 01/13/2009     Articulation disorder 09/23/2020     Atrial fibrillation (H) 04/2011     Benign prostatic hyperplasia with urinary retention      Cirrhosis (H)      CKD (chronic kidney disease) stage 2, GFR 60-89 ml/min      Diabetes mellitus type II     steroid induced     DJD (degenerative joint disease) of knee     SHAWN, worse on right     DVT (deep venous thrombosis) (H)      ED (erectile dysfunction)      Gallbladder polyp 05/2010     Ysbly-Oswetb-Zphg Disease 2007    BMT     Heart failure with preserved ejection fraction, NYHA class I (H)      Hemochromatosis      Hodgkin's disease NOS     5 cycles of ABVD in 2011     HTN (hypertension)      Hyperlipidaemia LDL goal <100      Multiple thyroid nodules     benign      Myeloproliferative disorder (H)     atypical, [facility]     Parkinson disease 09/24/2020     PE (pulmonary embolism) 11/2004     Polyneuropathy      Pressure ulcer      Primary pulmonary hypertension (H)      Severe protein-calorie malnutrition (H24) 11/10/2021     Small bowel obstruction (H) 10/29/2021     Thrombocytopenia (H24) 06/08/2021     Thyroid nodule 05/17/2016     Social History     Socioeconomic History     Marital status:      Spouse name: Angelica     Number of children: 2     Years of education: 21     Highest education level: Not on file   Occupational History     Occupation:      Employer: [employer]     Employer: [employer]   Tobacco Use     Smoking status: Never     Smokeless tobacco: Never   Substance and Sexual Activity     Alcohol use: No     Drug use: No     Sexual activity: Not Currently     Partners: Female   Other Topics Concern     Parent/sibling w/ CABG, MI or angioplasty before 65F 55M? No   Social History Narrative     about 50 yrs        Wife has some health issues - back pain - second knee replaced    She had gastric bypass and a fib and  bad mitral valve        Son in [place]    Daughter in [facility] with 10 yo son.         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[employer]        Born [place] raised in [place]              Social Determinants of Health     Financial Resource Strain: Low Risk  (11/28/2023)    Financial Resource Strain      Within the past 12 months, have you or your family members you live with been unable to get utilities (heat, electricity) when it was really needed?: No   Food Insecurity: Low Risk  (11/28/2023)    Food Insecurity      Within the past 12 months, did you worry that your food would run out before you got money to buy more?: No      Within the past 12 months, did the food you bought just not last and you didn t have money to get more?: No   Transportation Needs: Low Risk  (11/28/2023)    Transportation Needs      Within the past 12 months, has lack of transportation kept you from medical appointments, getting your medicines, non-medical meetings or appointments, work, or from getting things that you need?: No   Physical Activity: Not on file   Stress: Not on file   Social Connections: Not on file   Interpersonal Safety: Not on file   Housing Stability: Low Risk  (11/28/2023)    Housing Stability      Do you have housing? : Yes      Are you worried about losing your housing?: No       Drug and lactation database from the [place] National Library of Medicine:  http://toxnet.nlm.nih.gov/cgi-bin/sis/htmlgen?LACT      B/P: Data Unavailable, T: Data Unavailable, P: Data Unavailable, R: Data Unavailable 0 lbs 0 oz  There were no vitals taken for this visit., There is no height or weight on file to calculate BMI.  Medications and Vitals not listed above were documented in the cart and reviewed by me.     Current Outpatient Medications   Medication Sig Dispense Refill     amoxicillin (AMOXIL) 500 MG capsule Take 2,000 mg by mouth once Take 1 hour prior to dental procedure       carbidopa-levodopa (SINEMET)   MG tablet 1 tab 3/day at 8am, 2pm and 8pm 270 tablet 3     flecainide (TAMBOCOR) 50 MG tablet TAKE 1 TABLET TWICE A  tablet 2     loratadine (CLARITIN REDITABS) 10 MG ODT Take 10 mg by mouth daily       metoprolol tartrate (LOPRESSOR) 25 MG tablet TAKE ONE-HALF (1/2) TABLET (12.5 MG) ORALLY OR BY FEEDING TUBE TWICE A DAY (CRUSH TABLET) 90 tablet 1     NONFORMULARY Abbott osmolyte feedings 2 cans 3/day       omeprazole (PRILOSEC) 20 MG DR capsule TAKE 1 CAPSULE EVERY MORNING 90 capsule 1     PREVIDENT 5000 DRY MOUTH 1.1 % GEL topical gel Apply to affected area daily as needed Or brushes with water  3     rosuvastatin (CRESTOR) 40 MG tablet TAKE 1 TABLET AT BEDTIME 90 tablet 3     warfarin ANTICOAGULANT (COUMADIN) 2.5 MG tablet Take 2.5 mg Tues, Thurs, Sat and 3.75 mg all other days, or as directed by the Coumadin Clinic 110 tablet 1         Ángel Hill MD      Again, thank you for allowing me to participate in the care of your patient.        Sincerely,        Ángel Hill MD

## 2024-04-29 NOTE — PATIENT INSTRUCTIONS
Medications      9a   2p  3p 730p  9p   Acetaminophen tylenol 500mg no             Amoxicillin amoxil 500mg dentist             Calcium carbonate tums 500mg no             Carbidopa/levodopa Sinemet 25/100 1    1   Crush 1     Flecainide tambocor 50mg 1       1     Fluticasone flonase 50 mcg/act spray off             Loratadine claritin 10mg  1             Metoprolol tartrate 25mg  1/2 po       1/2 crush     MVI centrum 1             Nonform feeding tube osmolyte   2 cans   2 cans   2 cans   Omeprazole prilosec 20mg 1             Prevident 5000 dry mouth rare             Rosuvastatin crestor 40mg         1     Senna-docusate 8.6-50 no             Starch-maltodextrin thick-it no             Vit D3 cholecalciferol 50 mcg 2000  ?             Warfarin anticoag coumadin 2.5         schedule                         Plan:    Completed PT, SLP and OT and found the OT the most helpful as he had done PT and SLP in the past and had a bit of repetition to it and plateaued from benefit.     Taking his sinemet by mouth int he morning and swallows the 2nd dose in the afternoon and crushes his evening dose with other medications and puts in the tube.     He has more tremor and use to be just the right hand and now the left hand as well in the evening watching tv  Discussed protein and sinemet interaction and he is presently taking his medication usually 1 hour  before food.   His shaking is before his evening pills which he takes at 8pm. He has some double tapping on his phone and keyboard.     No falls but when he gets up in the middle of the night he is tipsy and he is careful.     He has urinary frequency. Seen urologist last year. Jose G Hawley  He has not been on medication for his bladder  He has not been on mirabegron/myrbetriq  He has not been on gemtesa/ubegron  As best I can determine.     Pharmacy ([facility]) consultation and medication management  Please call the scheduling number @ [phone] to set up an appointment  with pharmacists Leslie Clifford or Pricila Saleem.     Flecainide tambocor 50mg  Metoprolol tartrate 25mg   Blood pressure was 105/61 and heart rate was 67 today  He has had some light headedness but his readings are not low from his phone on review today.     6/4/2024 abdominal ultrasound  6/11/2024 return visit to see Dr. Miller and blood work   Transplant 2007  Lymphoma in the past  He is anticoagulated     He has not had choking but has some reflux and is always taste things. He does not have much appetite.   He is using a formula 2 cans 3 times per day  osmolyte   Taking omeprazole in the morning.   He rarely uses the prevident as he has swallowing problems and is easier without anything on his brush  He is getting his teeth cleaned every 4 months  He is taking claritin daily     Bowels are pretty - firm to loose and not taking anything and has variable where some days he is loose and other days he is not having a bowel movement for a few days.     Sleep is stable. He sleeps separately from his wife who has cpap and a suprapubic and he listens to [facility] radio. He listens to NPR, [employer] and [employer]. ?hallucinations - tactile at night vs muscle twitching.     Right hip pain and sleeping on the right side - not having daytime pain  Right leg is weaker.    Angelica Rock - wife - bladder surgery and urinary issues - suprapubic catheter     James - son - working in [place] and has house in [facility] [place]  - trying to get a local job.  Working on [employer] service.   Gemma - daughter - lives in  [place] and working with [employer] and immigration policy    PLAN   Consider taking 1.5 tabs of sinemet at 2pm  Consider parcopa 25/100 1/2 tab as needed in the evening - this dissolves in your mouth.   Pharmacy input - Pharmacy ([facility]) consultation and medication management  Please call the scheduling number @ [phone] to set up an appointment with pharmacists Leslie Clifford or Pricila Saleem.   4. Return  appointment  - last visit was 10/9/2023 and return in 6 months.

## 2024-05-03 ENCOUNTER — DOCUMENTATION ONLY (OUTPATIENT)
Dept: ANTICOAGULATION | Facility: CLINIC | Age: 74
End: 2024-05-03
Payer: COMMERCIAL

## 2024-05-03 DIAGNOSIS — Z79.01 LONG TERM CURRENT USE OF ANTICOAGULANT THERAPY: Primary | ICD-10-CM

## 2024-05-03 DIAGNOSIS — Z86.711 PERSONAL HISTORY OF PE (PULMONARY EMBOLISM): ICD-10-CM

## 2024-05-03 DIAGNOSIS — I48.0 PAROXYSMAL ATRIAL FIBRILLATION (H): ICD-10-CM

## 2024-05-03 LAB — INR HOME MONITORING: 2.3 RATIO (ref 2–3)

## 2024-05-03 NOTE — PROGRESS NOTES
ANTICOAGULATION  MANAGEMENT-Home Monitor Managed by Exception    Haresh EMILIE Rock 73 year old male is on warfarin with therapeutic INR result. (Goal INR 2.0-3.0)    Recent labs: (last 7 days)     05/03/24  1521   INR 2.3       Previous INR was Therapeutic  Medication, diet, health changes since last INR:chart reviewed; none identified  Contacted within the last 12 weeks by phone on 2/26/24  Last [facility] referral date: 07/03/2023      MINNIE Hutson was NOT contacted regarding therapeutic result today per home monitoring policy manage by exception agreement.   Current warfarin dose is to be continued:     Summary  As of 5/3/2024      Full warfarin instructions:  3.75 mg every Sun, Tue, Fri; 2.5 mg all other days   Next INR check:  5/10/2024             ?   Medina Salmeron RN  Anticoagulation Clinic  5/3/2024    _______________________________________________________________________     Anticoagulation Episode Summary       Current INR goal:  2.0-3.0   TTR:  85.1% (1 y)   Target end date:  Indefinite   Send INR reminders to:  [facility]    Indications    Long-term (current) use of anticoagulants [Z79.01] [Z79.01]  Atrial fibrillation (H) (Resolved) [I48.91]  Antiphospholipid antibody syndrome (H24) (Resolved) [D68.61]  Personal history of DVT (deep vein thrombosis) (Resolved) [Z86.718]  Atrial fibrillation  unspecified type (H) (Resolved) [I48.91]  Paroxysmal atrial fibrillation (H) [I48.0]  Chronic atrial fibrillation (H) (Resolved) [I48.20]  Personal history of PE (pulmonary embolism) [Z86.711]             Comments:  JESSICA rodas to manage by exception             Anticoagulation Care Providers       Provider Role Specialty Phone number    Anya Nowak MD Referring Family Medicine [phone]    Elizabeth Balderas MD Referring HOSPITALIST [phone]

## 2024-05-10 ENCOUNTER — IMMUNIZATION (OUTPATIENT)
Dept: FAMILY MEDICINE | Facility: CLINIC | Age: 74
End: 2024-05-10
Payer: COMMERCIAL

## 2024-05-10 DIAGNOSIS — Z23 ENCOUNTER FOR IMMUNIZATION: Primary | ICD-10-CM

## 2024-05-10 DIAGNOSIS — Z23 NEED FOR SHINGLES VACCINE: ICD-10-CM

## 2024-05-10 DIAGNOSIS — Z23 NEED FOR TDAP VACCINATION: ICD-10-CM

## 2024-05-10 PROCEDURE — 99207 PR NO CHARGE NURSE ONLY: CPT

## 2024-05-10 PROCEDURE — 90480 ADMN SARSCOV2 VAC 1/ONLY CMP: CPT

## 2024-05-10 PROCEDURE — 91320 SARSCV2 VAC 30MCG TRS-SUC IM: CPT

## 2024-05-10 NOTE — PROGRESS NOTES
".Prior to immunization administration, verified patients identity using patient s name and date of birth. Please see Immunization Activity for additional information.     Screening Questionnaire for Adult Immunization    Are you sick today?   {:527082}   Do you have allergies to medications, food, a vaccine component or latex?   {:986921}   Have you ever had a serious reaction after receiving a vaccination?   {:126520}   Do you have a long-term health problem with heart, lung, kidney, or metabolic disease (e.g., diabetes), asthma, a blood disorder, no spleen, complement component deficiency, a cochlear implant, or a spinal fluid leak?  Are you on long-term aspirin therapy?   {:162019}   Do you have cancer, leukemia, HIV/AIDS, or any other immune system problem?   {:577243}   Do you have a parent, brother, or sister with an immune system problem?   {:793773}   In the past 3 months, have you taken medications that affect  your immune system, such as prednisone, other steroids, or anticancer drugs; drugs for the treatment of rheumatoid arthritis, Crohn s disease, or psoriasis; or have you had radiation treatments?   {:343656}   Have you had a seizure, or a brain or other nervous system problem?   {:785882}   During the past year, have you received a transfusion of blood or blood    products, or been given immune (gamma) globulin or antiviral drug?   {:504121}   For women: Are you pregnant or is there a chance you could become       pregnant during the next month?   {:270107}   Have you received any vaccinations in the past 4 weeks?   {:893008}     Immunization questionnaire { :877108::\"answers were all negative.\"}    I have reviewed the following standing orders: {Adult Vaccine Standing Order:132613}    Patient instructed to remain in clinic for 15 minutes afterwards, and to report any adverse reactions.     Screening performed by Belinda Claire MA on 5/10/2024 at 11:09 AM.    "

## 2024-05-11 LAB — INR HOME MONITORING: 2.5 RATIO (ref 2–3)

## 2024-05-13 ENCOUNTER — DOCUMENTATION ONLY (OUTPATIENT)
Dept: ANTICOAGULATION | Facility: CLINIC | Age: 74
End: 2024-05-13
Payer: COMMERCIAL

## 2024-05-13 DIAGNOSIS — Z79.01 LONG TERM CURRENT USE OF ANTICOAGULANT THERAPY: Primary | ICD-10-CM

## 2024-05-13 DIAGNOSIS — Z86.711 PERSONAL HISTORY OF PE (PULMONARY EMBOLISM): ICD-10-CM

## 2024-05-13 DIAGNOSIS — I48.0 PAROXYSMAL ATRIAL FIBRILLATION (H): ICD-10-CM

## 2024-05-13 NOTE — PROGRESS NOTES
ANTICOAGULATION  MANAGEMENT-Home Monitor Managed by Exception    Haresh Rock 74 year old male is on warfarin with therapeutic INR result. (Goal INR 2.0-3.0)    Recent labs: (last 7 days)     05/11/24  1228   INR 2.5       Previous INR was Therapeutic  Medication, diet, health changes since last INR:chart reviewed; none identified  Contacted within the last 12 weeks by phone on 2/26/24  Last [facility] referral date: 07/03/2023      MINNIE Hutson was NOT contacted regarding therapeutic result today per home monitoring policy manage by exception agreement.   Current warfarin dose is to be continued:     Summary  As of 5/13/2024      Full warfarin instructions:  3.75 mg every Sun, Tue, Fri; 2.5 mg all other days   Next INR check:  5/20/2024             ?   Allyn Doe RN  Anticoagulation Clinic  5/13/2024    _______________________________________________________________________     Anticoagulation Episode Summary       Current INR goal:  2.0-3.0   TTR:  85.0% (1 y)   Target end date:  Indefinite   Send INR reminders to:  [facility]    Indications    Long-term (current) use of anticoagulants [Z79.01] [Z79.01]  Atrial fibrillation (H) (Resolved) [I48.91]  Antiphospholipid antibody syndrome (H24) (Resolved) [D68.61]  Personal history of DVT (deep vein thrombosis) (Resolved) [Z86.718]  Atrial fibrillation  unspecified type (H) (Resolved) [I48.91]  Paroxysmal atrial fibrillation (H) [I48.0]  Chronic atrial fibrillation (H) (Resolved) [I48.20]  Personal history of PE (pulmonary embolism) [Z86.711]             Comments:  JESSICA rodas to manage by exception             Anticoagulation Care Providers       Provider Role Specialty Phone number    Anya Nowak MD Referring Family Medicine [phone]    Elizabeth Balderas MD Referring HOSPITALIST [phone]

## 2024-05-17 ENCOUNTER — DOCUMENTATION ONLY (OUTPATIENT)
Dept: ANTICOAGULATION | Facility: CLINIC | Age: 74
End: 2024-05-17
Payer: COMMERCIAL

## 2024-05-17 DIAGNOSIS — Z79.01 LONG TERM CURRENT USE OF ANTICOAGULANT THERAPY: Primary | ICD-10-CM

## 2024-05-17 DIAGNOSIS — Z86.711 PERSONAL HISTORY OF PE (PULMONARY EMBOLISM): ICD-10-CM

## 2024-05-17 DIAGNOSIS — I48.0 PAROXYSMAL ATRIAL FIBRILLATION (H): ICD-10-CM

## 2024-05-17 LAB — INR HOME MONITORING: 2.3 RATIO (ref 2–3)

## 2024-05-17 NOTE — PROGRESS NOTES
ANTICOAGULATION  MANAGEMENT-Home Monitor Managed by Exception    Haresh Rock 74 year old male is on warfarin with therapeutic INR result. (Goal INR 2.0-3.0)    Recent labs: (last 7 days)     05/17/24  1036   INR 2.3       Previous INR was Therapeutic  Medication, diet, health changes since last INR:chart reviewed; none identified  Contacted within the last 12 weeks by phone on 2/26/24   Last [facility] referral date: 07/03/2023  12 weeks outreach due at next check    PLAN     Haresh was NOT contacted regarding therapeutic result today per home monitoring policy manage by exception agreement.   Current warfarin dose is to be continued:     Summary  As of 5/17/2024      Full warfarin instructions:  3.75 mg every Sun, Tue, Fri; 2.5 mg all other days   Next INR check:  5/24/2024             ?   Akilah Sharp RN  Anticoagulation Clinic  5/17/2024    _______________________________________________________________________     Anticoagulation Episode Summary       Current INR goal:  2.0-3.0   TTR:  85.1% (1 y)   Target end date:  Indefinite   Send INR reminders to:  [facility]    Indications    Long-term (current) use of anticoagulants [Z79.01] [Z79.01]  Atrial fibrillation (H) (Resolved) [I48.91]  Antiphospholipid antibody syndrome (H24) (Resolved) [D68.61]  Personal history of DVT (deep vein thrombosis) (Resolved) [Z86.718]  Atrial fibrillation  unspecified type (H) (Resolved) [I48.91]  Paroxysmal atrial fibrillation (H) [I48.0]  Chronic atrial fibrillation (H) (Resolved) [I48.20]  Personal history of PE (pulmonary embolism) [Z86.711]             Comments:  JESSICA rodas to manage by exception             Anticoagulation Care Providers       Provider Role Specialty Phone number    Anya Nowak MD Referring Family Medicine [phone]    Elizabeth Balderas MD Referring HOSPITALIST [phone]

## 2024-05-24 ENCOUNTER — DOCUMENTATION ONLY (OUTPATIENT)
Dept: ANTICOAGULATION | Facility: CLINIC | Age: 74
End: 2024-05-24
Payer: COMMERCIAL

## 2024-05-24 ENCOUNTER — ANTICOAGULATION THERAPY VISIT (OUTPATIENT)
Dept: ANTICOAGULATION | Facility: CLINIC | Age: 74
End: 2024-05-24
Payer: COMMERCIAL

## 2024-05-24 DIAGNOSIS — I48.0 PAROXYSMAL ATRIAL FIBRILLATION (H): ICD-10-CM

## 2024-05-24 DIAGNOSIS — I48.0 PAROXYSMAL ATRIAL FIBRILLATION (H): Primary | ICD-10-CM

## 2024-05-24 DIAGNOSIS — Z86.711 PERSONAL HISTORY OF PE (PULMONARY EMBOLISM): ICD-10-CM

## 2024-05-24 DIAGNOSIS — Z79.01 LONG TERM CURRENT USE OF ANTICOAGULANT THERAPY: ICD-10-CM

## 2024-05-24 DIAGNOSIS — Z79.01 LONG TERM CURRENT USE OF ANTICOAGULANT THERAPY: Primary | ICD-10-CM

## 2024-05-24 LAB — INR HOME MONITORING: 2.2 RATIO (ref 2–3)

## 2024-05-24 NOTE — PROGRESS NOTES
ANTICOAGULATION MANAGEMENT     Haresh Rock 74 year old male is on warfarin with therapeutic INR result. (Goal INR 2.0-3.0)    Recent labs: (last 7 days)     05/24/24  1252   INR 2.2       ASSESSMENT     Source(s): Chart Review  Previous INR was Therapeutic last 2(+) visits  Medication, diet, health changes since last INR chart reviewed; none identified  Outreached to patient today as he is due for 12 week call, he is MBE with his home INR monitor         PLAN     Recommended plan for no diet, medication or health factor changes affecting INR     Dosing Instructions: Continue your current warfarin dose with next INR in 1 week       Summary  As of 5/24/2024      Full warfarin instructions:  3.75 mg every Sun, Tue, Fri; 2.5 mg all other days   Next INR check:  5/31/2024               Detailed voice message left for Haresh with dosing instructions and follow up date.   Sent [place] message with dosing and follow up instructions    Patient to recheck with home meter    Education provided:   Please call back if any changes to your diet, medications or how you've been taking warfarin  Importance of notifying anticoagulation clinic for: changes in medications; a sooner lab recheck maybe needed, diarrhea, nausea/vomiting, reduced intake, cold/flu, and/or infections; a sooner lab recheck maybe needed, and upcoming surgeries and procedures 2 weeks in advance  Resume manage by exception with home monitor. Continue to submit INR results to home monitor company.You will only be called when your result is out of range. Please call and notify [facility] if new medication started, dose missed, signs or symptoms of bleeding or clotting, or a surgery/procedure is scheduled. Due for next call no later than: 8/22/24.  Contact [phone]  with any changes, questions or concerns.     Plan made per [facility] anticoagulation protocol    Nina Ventura RN  Anticoagulation  Clinic  5/24/2024    _______________________________________________________________________     Anticoagulation Episode Summary       Current INR goal:  2.0-3.0   TTR:  85.1% (1 y)   Target end date:  Indefinite   Send INR reminders to:  [facility]    Indications    Long-term (current) use of anticoagulants [Z79.01] [Z79.01]  Atrial fibrillation (H) (Resolved) [I48.91]  Antiphospholipid antibody syndrome (H24) (Resolved) [D68.61]  Personal history of DVT (deep vein thrombosis) (Resolved) [Z86.718]  Atrial fibrillation  unspecified type (H) (Resolved) [I48.91]  Paroxysmal atrial fibrillation (H) [I48.0]  Chronic atrial fibrillation (H) (Resolved) [I48.20]  Personal history of PE (pulmonary embolism) [Z86.711]             Comments:  JESSICA rodas to manage by exception             Anticoagulation Care Providers       Provider Role Specialty Phone number    Anya Nowak MD Referring Family Medicine [phone]    Elizabeth Balderas MD Referring HOSPITALIST [phone]

## 2024-05-24 NOTE — PROGRESS NOTES
ANTICOAGULATION CLINIC REFERRAL RENEWAL REQUEST       An annual renewal order is required for all patients referred to [facility].?  Please review and sign the pended referral order for Haresh Rock.       ANTICOAGULATION SUMMARY      Warfarin indication(s)   Atrial Fibrillation, DVT, PE, and Antiphospholipid Antibodies    Mechanical heart valve present?  NO       Current goal range   INR: 2.0-3.0     Goal appropriate for indication? Goal INR 2-3, standard for indication(s) above     Time in Therapeutic Range (TTR)  (Goal > 60%) 85%       Office visit with referring provider's group within last year yes on 11/29/23       Nina Ventura RN  [facility]

## 2024-05-28 NOTE — PATIENT INSTRUCTIONS
----- Message from Danielle CARRILLO sent at 5/28/2024  8:23 AM EDT -----  Regarding: Care Gap Request  05/28/24 8:23 AM    Hello, our patient No patient name on file. has had Hepatitis C completed/performed. Please assist in updating the patient chart by pulling the document from Lab Tab within Chart Review. The date of service is 06/16/2023.     Thank you,  Danielle Diaz PG [facility]     05/28/24 8:23 AM    Hello, our patient No patient name on file. has had HIV completed/performed. Please assist in updating the patient chart by pulling the document from Lab Tab within Chart Review. The date of service is 06/16/2023.     Thank you,  Danielle Diaz PG [facility]   Assessment:    (G20) Parkinson disease (H)  (primary encounter diagnosis)  Dopamine scan - (datscan)  11/3/2020  A presynaptic dopaminergic deficit is demonstrated bilaterally.     Review of diagnosis  parkinson  Avoidance of dopamine blockers yes  Motor complication review  -   Review of Impulse control disorders no  Review of surgical or medication options yes    Review of diagnosis    parkinson    Avoidance of dopamine blockers   no    Motor complication review   No     Review of Impulse control disorders   denies    Review of surgical or medication options   no    Gait/Balance/Falls   No falls    Exercise/Therapy performed/offered   Walked around the block yesterday     Cognitive/Driving   Driving - not problems  No cognitive problems     Mood      Daughter with bipolar  Son with mood issues  He has some depression     Hallucinations/delusions   No hallucinations     Sleep  Pseudoeph-doxylamine DM APAP nyquil - not using  Sleep pretty well  Nocturia 2/noc  Falls asleep during the day  Napping during the day  Falls asleep when watching news  Falls asleep during the afternoon  Up 3/noc for bathroom   Midnight goes to bed and up at 8am  Naps during day   Not talking in sleep or snoring.  No unusual behaviors  Wakes up before 3am, 5/6a and 7am    Bladder  Renal function.   May have stage 2 disease.      Prostate - denies problems. He has has nocturia couple times per night.   Denies elevated psa     Prostate surgery; urinating better     Dutasteride avodart 0.5mg - off   Tamsulosin flomax 0.4mg - stopped  Had low blood pressure and fluids     July 5th ED visit and got catheter  Surgery yesterday laser and should get catheter out  Dr Jose G Hawley     GI/Constipation/GERD  Swallow study 9/22/2020  1. Multiple events of deep laryngeal penetration with thinner barium consistencies with intermittent aspiration.      Gallstones but has not gallbladder removed.  Had a gallbladder attack x 1 and is not on  actigall     Bisacodyl dulcolax 5mg EC tablet - not using   Calcium carbonate tums 500mg ?  Nutritional supplement - 1 bottle per day  - not using   Pantoprazole protonix 20mg - not using  Polythyelene glycol miralax - not using   Prevident 5000 dry mouth - using  Senna-docusate senexon-S senokot-S  - no  Weight loss better with feeding tube  No constipation  Has some seepage  When urinates he has some stool    Had bloating and blockage with the feeding tube placement and had emergency surgery   10/29/2021 Scan showed problems     ENDO  Cholesterol  Rosuvastatin crestor 40mg   Vitamin D3 cholecalciferol 50 mcg 2000 units  Had diabetes in the past from prednisone  And this may have resolved  May have a thyroid nodule.  TSH was normal.   A1c was 5.5      Cardio/heart  Flecainide tambocor 50mg twice daily  Metoprolol succinate ER Toprol XL 25mg 24 hr tablet  Blood pressure has been okay   122/63  Low bp has cleared up  MRI of heart next week     Vision  Wears glasses  Eye - left eye has been poor since age 2 due to a lazy eye - amblyopia  Right eye post cataract surgery had an artificial lens put in and does not wear glasses till the evening      Heme   Folic acid folvite 1mg - not taking   Anticoagulated  Warfarin     Hereditary hemochromatosis gene - gene that increases his risk and has not had problems with this. Had had phlebotomies x 2 in the past.      AYUSH Torres     History of pulmonary embolii - back in 2004. This was related to blood clotting problems.   May have had a DVT. Reportedly has had antiphospholipid antibody. He is on coumadin.      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 Had a transplant from Dr. 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  No problems since then.      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      Pulmonary hypertension in the past.   Fluticasone flonase 50 mcg/act nasal spray  Loratadine claritin 10mg    CT of lung next      msk - bilateral knee replacements.     Hearing. Feels he has wax in his left ear and partial problem with the right ear. It is variable problem. Has used ear wax removal.      He reports a neuropathy and that his foot drop has resolved. 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      Medications      8a  2p  3p 8p  9p   Acetaminophen tylenol 500mg no           Amoxicillin amoxil 500mg dentist           Calcium carbonate tums 500mg no           Carbidopa/levodopa Sinemet 25/100 1   1   1     Dutasteride avodart 0.5mg no           Flecainide tambocor 50mg 1      1    Fluticasone flonase 50 mcg/act spray prn           Loratadine claritin 10mg  1           Metoprolol succinate ER Toprol XL 25mg 24 hr         1    MVI centrum 1           Nonformulary feeding tube osmolyte  2 cans  2 cans  2 cans   Omeprazole prilosec 20mg 1        Prevident 5000 dry mouth using           Pseudoeph-doxylamine DM APAP nyquil no           Rosuvastatin crestor 40mg        1    Senna-docusate 8.6-50 no           Starch-maltodextrin thick-it no           Vitamin D3 cholecalciferol 50 mcg 2000 units gel  1           Warfarin anticoagulant coumadin 2.5mg       schedule                      Plan:    Feeding tube has helped him gain 20 lbs - he gets feedings 1 hour after medications so 9am, 3pm and 9pm    Takes parkinson medications by mouth for the first two doses and crush the last one of the day through the feeding tube  Schedule is 8am, 2pm and 8pm    With his fecal seepage discussed the use of a bidet or adapting his toilet with one - h e has a raised toilet seat so not clear what needs to be done. Consider seeing Occupational therapy.     Return back

## 2024-05-29 ENCOUNTER — VIRTUAL VISIT (OUTPATIENT)
Dept: NEUROLOGY | Facility: CLINIC | Age: 74
End: 2024-05-29
Attending: PSYCHIATRY & NEUROLOGY
Payer: COMMERCIAL

## 2024-05-29 DIAGNOSIS — E78.5 HYPERLIPIDEMIA LDL GOAL <70: ICD-10-CM

## 2024-05-29 DIAGNOSIS — G20.A1 PARKINSON'S DISEASE, UNSPECIFIED WHETHER DYSKINESIA PRESENT, UNSPECIFIED WHETHER MANIFESTATIONS FLUCTUATE (H): Primary | ICD-10-CM

## 2024-05-29 DIAGNOSIS — K21.9 GASTROESOPHAGEAL REFLUX DISEASE, UNSPECIFIED WHETHER ESOPHAGITIS PRESENT: ICD-10-CM

## 2024-05-29 DIAGNOSIS — I48.0 PAROXYSMAL ATRIAL FIBRILLATION (H): ICD-10-CM

## 2024-05-29 DIAGNOSIS — J31.0 CHRONIC RHINITIS: ICD-10-CM

## 2024-05-29 PROCEDURE — 99207 PR NO BILLABLE SERVICE THIS VISIT: CPT | Mod: 95 | Performed by: PHARMACIST

## 2024-05-29 NOTE — Clinical Note
5/29/2024       RE: Haresh Rock  [address] [place] [address]     Dear Colleague,    Thank you for referring your patient, Haresh Rock, to the [facility] [place] [facility]. Please see a copy of my visit note below.    Medication Therapy Management (MTM) Encounter    ASSESSMENT:                            Medication Adherence/Access: No issues identified    Parkinson's Disease:         Afib/Hypertension:        Hyperlipidemia:       GERD:        Allergic Rhinitis:        Vitamins/supplements:       PLAN:                            Famotidine (Pepcid) 20 mg at 7 pm for a few weeks   Increase carbidopa-levodopa to 1.5 tablets at 2 pm   If you forget a dose of carbidopa-levodopa, take it when you remember even if it is at the same time as your tube feeing     Follow-up: 7/23/24 at 11:30 am video visit    SUBJECTIVE/OBJECTIVE:                          Haresh Rock is a 74 year old male contacted via secure video for a follow-up visit.       Reason for visit: medication review.    Allergies/ADRs: Reviewed in chart  Past Medical History: Reviewed in chart  Tobacco: He reports that he has never smoked. He has never used smokeless tobacco.  Alcohol: not currently using    Medication Adherence/Access:   8-9 am- swallow morning pills by mouth- flecainide, carbidopa-levodopa, vitamin D, omeprazole, metoprolol   9-11 am- morning feeding for 45 minutes (at least 1 hour after morning medication)  12 pm- nap (at least 1 hour after feeding is completed) & take loratadine ODT (by mouth)  2 pm- carbidopa-levodopa crushed via G-tube  3-4 pm- afternoon feeding for 45 minutes  7-7:30 pm- evening pills crushed via G-tube- warfarin, flecainide, rosuvastatin, carbidopa-levodopa   9 pm- evening feeding for 45 minutes     Parkinson's Disease:    - carbidopa-levodopa  mg, 1 tablet 3 times daily (8-9 am, 2 pm, 7-7:30 pm)  Patient is taking  "the carbidopa-levodopa at least 1 hour before feedings. He asked about what to do if he forgets to take a dose.   Current concerns include: reflux/swallowing, aspiration risk, voice is quite (wife is hard of hearing), increased tremors especially in the late afternoon or evening. He can typically control the tremors if he focuses on it. The tremors impact his ability to type on the keyboard. He has not yet tried a higher dose of carbidopa-levodopa. He is not having trouble with mobility or stiffness. He does get fatigued during the day but is still able to do yard work. Swallowing pills in the morning is going well. He drinks some code red to help improve the taste in his mouth.   He uses an apple watch to track his sleep. He is typically in bed for 9-10 hours and sleeps for 4-5 hours, sometimes more. Typically will get up 3-4 times nightly to urinate and is cautious when he gets up to not fall. He sleeps in a separate bedroom than his wife as she has a CPAP. He falls asleep to the radio.      Afib/Hypertension:   - flecainide 50 mg twice daily  - metoprolol 12.5 mg twice daily  - warfarin 3.75 mg every Sun/Tues/Fri and 2.5 mg all other days   No concerns reported today. He hasn't had much dizziness lately. Blood pressure typically runs 120s/75-80. At times blood pressure gets low but he doesn't get dizzy.      Hyperlipidemia:   - rosuvastatin 40 mg daily  No concerns reported today     GERD:   - omeprazole 20 mg every morning  Patient states he is having reflux due to tube feedings. Nighttime is the worst for the reflux. He uses a \"beefy\" pillow\" but head of bed is flat when sleeping. He has dry mouth but also has drooling at night.      Allergic Rhinitis:   - loratadine 10 mg ODT, 1-2 times daily  Patient states that he dissolves this medication by mouth, typically taking it at noon at naptime and sometimes a second dose in the evening before bed. He gets some nasal drainage and this medication typically helps.  "      Vitamins/supplements:   - multivitamin daily  - vitamin D3 daily     Today's Vitals: There were no vitals taken for this visit.  ----------------    I spent 29 minutes with this patient today. All changes were made via collaborative practice agreement with Dr. Hill. A copy of the visit note was provided to the patient's provider(s).    A summary of these recommendations was sent via [facility].    Leslie Clifford, Pharm.D.  Medication Therapy Management Pharmacist  [facility]    Telemedicine Visit Details  Type of service:  Video Conference via [employer]  Start Time:  10:00 AM  End Time: 10:29 AM     Medication Therapy Recommendations  No medication therapy recommendations to display     Medication Therapy Management (MTM) Encounter    ASSESSMENT:                            Medication Adherence/Access: No issues identified    Parkinson's Disease:    Patient may benefit from increase in dose of carbidopa-levodopa at 2 pm as discussed with Dr. Hill to help treat afternoon breakthrough tremors. We discussed that if he forgets to take carbidopa-levodopa before his meal, it is okay for him to still take the carbidopa-levodopa when he remembers- that dose just may be a bit less effective given the interaction with protein in tube feeding      Afib/Hypertension:   Stable      Hyperlipidemia:   Stable     GERD:   Patient may benefit from adding a dose of a H2 blocker in the evening to help lessen his nighttime reflux symptoms. Omeprazole seems to work well during the day.      Allergic Rhinitis:   Stable     PLAN:                            Try adding Famotidine (Pepcid) 20 mg at 7 pm to help with reflux at night. This medication can be purchased over the counter. If it doesn't help after a few weeks, you can stop taking it.   Increase carbidopa-levodopa to 1.5 tablets at 2 pm to help with afternoon/evening tremors.   If you forget a dose of carbidopa-levodopa, take it when you remember even if it is at  the same time as your tube feeding. This won't cause any adverse effects but that dose of carbidopa-levodopa may be a bit less effective.     Follow-up: 7/23/24 at 11:30 am video visit    SUBJECTIVE/OBJECTIVE:                          Haresh Rock is a 74 year old male contacted via secure video for a follow-up visit.       Reason for visit: medication review.    Allergies/ADRs: Reviewed in chart  Past Medical History: Reviewed in chart  Tobacco: He reports that he has never smoked. He has never used smokeless tobacco.  Alcohol: not currently using    Medication Adherence/Access:   8-9 am- swallow morning pills by mouth- flecainide, carbidopa-levodopa, omeprazole, metoprolol   9-11 am- morning feeding for 45 minutes (at least 1 hour after morning medication)  12 pm- nap (at least 1 hour after feeding is completed) & loratadine ODT (by mouth)  2 pm- carbidopa-levodopa crushed via G-tube  3-4 pm- afternoon feeding for 45 minutes  7-7:30 pm- evening pills crushed via G-tube- warfarin, flecainide, rosuvastatin, carbidopa-levodopa   9 pm- evening feeding for 45 minutes     Parkinson's Disease:    - carbidopa-levodopa  mg, 1 tablet 3 times daily (8-9 am, 2 pm, 7-7:30 pm)  Patient is taking the carbidopa-levodopa at least 1 hour before feedings. He asked about what to do if he forgets to take a dose.   Current concerns include: reflux/swallowing, aspiration risk, voice is quite (wife is hard of hearing), increased tremors especially in the late afternoon or evening. He can typically control the tremors if he focuses on it. The tremors impact his ability to type on the keyboard. He has not yet tried a higher dose of carbidopa-levodopa. He is not having trouble with mobility or stiffness. He does get fatigued during the day but is still able to do yard work. Swallowing pills in the morning is going well. He drinks some code red to help improve the taste in his mouth.   He uses an apple watch to track his sleep. He is  "typically in bed for 9-10 hours and sleeps for 4-5 hours, sometimes more. Typically will get up 3-4 times nightly to urinate and is cautious when he gets up to not fall. He sleeps in a separate bedroom than his wife as she has a CPAP. He falls asleep to the radio.      Afib/Hypertension:   - flecainide 50 mg twice daily  - metoprolol 12.5 mg twice daily  - warfarin 3.75 mg every Sun/Tues/Fri and 2.5 mg all other days   No concerns reported today. He hasn't had much dizziness lately. Blood pressure typically runs 120s/75-80. At times blood pressure gets low but he doesn't get dizzy.      Hyperlipidemia:   - rosuvastatin 40 mg daily  No concerns reported today     GERD:   - omeprazole 20 mg every morning  Patient states he is having reflux due to tube feedings. Nighttime is the worst for the reflux. He uses a \"beefy\" pillow\" but head of bed is flat when sleeping. He has dry mouth but also has drooling at night.      Allergic Rhinitis:   - loratadine 10 mg ODT, 1-2 times daily  Patient states that he dissolves this medication by mouth, typically taking it at noon at naptime and sometimes a second dose in the evening before bed. He gets some nasal drainage and this medication typically helps.      Today's Vitals: There were no vitals taken for this visit.  ----------------    I spent 29 minutes with this patient today. All changes were made via collaborative practice agreement with Dr. Hill. A copy of the visit note was provided to the patient's provider(s).    A summary of these recommendations was sent via [email or web address].    Leslie Clifford, Pharm.D.  Medication Therapy Management Pharmacist  [facility]    Telemedicine Visit Details  Type of service:  Video Conference via [employer]  Start Time:  10:00 AM  End Time: 10:29 AM     Medication Therapy Recommendations  No medication therapy recommendations to display       Again, thank you for allowing me to participate in the care of your patient.  "     Sincerely,    Leslie Clifford, formerly [facility]

## 2024-05-29 NOTE — PATIENT INSTRUCTIONS
"Recommendations from today's MTM visit:                                                    MTM (medication therapy management) is a service provided by a clinical pharmacist designed to help you get the most of out of your medicines.      Try adding Famotidine (Pepcid) 20 mg at 7 pm to help with reflux at night. This medication can be purchased over the counter. If it doesn't help after a few weeks, you can stop taking it.   Increase carbidopa-levodopa to 1.5 tablets at 2 pm to help with afternoon/evening tremors.   If you forget a dose of carbidopa-levodopa, take it when you remember even if it is at the same time as your tube feeding. This won't cause any adverse effects but that dose of carbidopa-levodopa may be a bit less effective.     Follow-up: 7/23/24 at 11:30 am video visit    It was great speaking with you today.  I value your experience and would be very thankful for your time in providing feedback in our clinic survey. In the next few days, you may receive an email or text message from [employer] with a link to a survey related to your  clinical pharmacist.\"     To schedule another MTM appointment, please call the clinic directly or you may call the MTM scheduling line at [phone].    My Clinical Pharmacist's contact information:                                                      Please feel free to contact me with any questions or concerns you have.      Leslie Clifford, Pharm.D.  Medication Therapy Management Pharmacist  [facility]     "

## 2024-05-29 NOTE — PROGRESS NOTES
Medication Therapy Management (MTM) Encounter    ASSESSMENT:                            Medication Adherence/Access: No issues identified    Parkinson's Disease:    Patient may benefit from increase in dose of carbidopa-levodopa at 2 pm as discussed with Dr. Hill to help treat afternoon breakthrough tremors. We discussed that if he forgets to take carbidopa-levodopa before his meal, it is okay for him to still take the carbidopa-levodopa when he remembers- that dose just may be a bit less effective given the interaction with protein in tube feeding      Afib:   Stable      Hyperlipidemia:   Stable     GERD:   Patient may benefit from adding a dose of a H2 blocker in the evening to help lessen his nighttime reflux symptoms. Omeprazole seems to work well during the day.      Allergic Rhinitis:   Stable     PLAN:                            Try adding Famotidine (Pepcid) 20 mg at 7 pm to help with reflux at night. This medication can be purchased over the counter. If it doesn't help after a few weeks, you can stop taking it.   Increase carbidopa-levodopa to 1.5 tablets at 2 pm to help with afternoon/evening tremors.   If you forget a dose of carbidopa-levodopa, take it when you remember even if it is at the same time as your tube feeding. This won't cause any adverse effects but that dose of carbidopa-levodopa may be a bit less effective.     Follow-up: 7/23/24 at 11:30 am video visit    SUBJECTIVE/OBJECTIVE:                          Haresh Rock is a 74 year old male contacted via secure video for a follow-up visit.       Reason for visit: medication review.    Allergies/ADRs: Reviewed in chart  Past Medical History: Reviewed in chart  Tobacco: He reports that he has never smoked. He has never used smokeless tobacco.  Alcohol: not currently using    Medication Adherence/Access:   8-9 am- swallow morning pills by mouth- flecainide, carbidopa-levodopa, omeprazole, metoprolol   9-11 am- morning feeding for 45 minutes (at  least 1 hour after morning medication)  12 pm- nap (at least 1 hour after feeding is completed) & loratadine ODT (by mouth)  2 pm- carbidopa-levodopa crushed via G-tube  3-4 pm- afternoon feeding for 45 minutes  7-7:30 pm- evening pills crushed via G-tube- warfarin, flecainide, rosuvastatin, carbidopa-levodopa   9 pm- evening feeding for 45 minutes     Parkinson's Disease:    - carbidopa-levodopa  mg, 1 tablet 3 times daily (8-9 am, 2 pm, 7-7:30 pm)  Patient is taking the carbidopa-levodopa at least 1 hour before feedings. He asked about what to do if he forgets to take a dose.   Current concerns include: reflux/swallowing, aspiration risk, voice is quite (wife is hard of hearing), increased tremors especially in the late afternoon or evening. He can typically control the tremors if he focuses on it. The tremors impact his ability to type on the keyboard. He has not yet tried a higher dose of carbidopa-levodopa. He is not having trouble with mobility or stiffness. He does get fatigued during the day but is still able to do yard work. Swallowing pills in the morning is going well. He drinks some code red to help improve the taste in his mouth.   He uses an apple watch to track his sleep. He is typically in bed for 9-10 hours and sleeps for 4-5 hours, sometimes more. Typically will get up 3-4 times nightly to urinate and is cautious when he gets up to not fall. He sleeps in a separate bedroom than his wife as she has a CPAP. He falls asleep to the radio.      Afib:   - flecainide 50 mg twice daily  - metoprolol 12.5 mg twice daily  - warfarin 3.75 mg every Sun/Tues/Fri and 2.5 mg all other days   No concerns reported today. He hasn't had much dizziness lately. Blood pressure typically runs 120s/75-80. At times blood pressure gets low but he doesn't get dizzy.      Hyperlipidemia:   - rosuvastatin 40 mg daily  No concerns reported today     GERD:   - omeprazole 20 mg every morning  Patient states he is having  "reflux due to tube feedings. Nighttime is the worst for the reflux. He uses a \"beefy\" pillow\" but head of bed is flat when sleeping. He has dry mouth but also has drooling at night.      Allergic Rhinitis:   - loratadine 10 mg ODT, 1-2 times daily  Patient states that he dissolves this medication by mouth, typically taking it at noon at naptime and sometimes a second dose in the evening before bed. He gets some nasal drainage and this medication typically helps.      Today's Vitals: There were no vitals taken for this visit.  ----------------    I spent 29 minutes with this patient today. All changes were made via collaborative practice agreement with Dr. Hill. A copy of the visit note was provided to the patient's provider(s).    A summary of these recommendations was sent via [employer].    Leslie Clifford, Pharm.D.  Medication Therapy Management Pharmacist  [facility]    Telemedicine Visit Details  Type of service:  Video Conference via [employer]  Start Time:  10:00 AM  End Time: 10:29 AM     Medication Therapy Recommendations  Gastroesophageal reflux disease, unspecified whether esophagitis present    Current Medication: omeprazole (PRILOSEC) 20 MG DR capsule   Rationale: Synergistic therapy - Needs additional medication therapy - Indication   Recommendation: Start Medication - famotidine 20 MG tablet   Status: Accepted - no CPA Needed         Parkinson's disease (H)    Current Medication: carbidopa-levodopa (SINEMET)  MG tablet   Rationale: Dose too low - Dosage too low - Effectiveness   Recommendation: Increase Dose   Status: Accepted per CPA            "

## 2024-05-31 ENCOUNTER — DOCUMENTATION ONLY (OUTPATIENT)
Dept: ANTICOAGULATION | Facility: CLINIC | Age: 74
End: 2024-05-31
Payer: COMMERCIAL

## 2024-05-31 DIAGNOSIS — Z79.01 LONG TERM CURRENT USE OF ANTICOAGULANT THERAPY: Primary | ICD-10-CM

## 2024-05-31 DIAGNOSIS — Z86.711 PERSONAL HISTORY OF PE (PULMONARY EMBOLISM): ICD-10-CM

## 2024-05-31 DIAGNOSIS — I48.0 PAROXYSMAL ATRIAL FIBRILLATION (H): ICD-10-CM

## 2024-05-31 LAB — INR HOME MONITORING: 2.1 RATIO (ref 2–3)

## 2024-06-04 ENCOUNTER — PATIENT OUTREACH (OUTPATIENT)
Dept: ONCOLOGY | Facility: CLINIC | Age: 74
End: 2024-06-04

## 2024-06-04 ENCOUNTER — ANCILLARY PROCEDURE (OUTPATIENT)
Dept: ULTRASOUND IMAGING | Facility: CLINIC | Age: 74
End: 2024-06-04
Attending: INTERNAL MEDICINE
Payer: COMMERCIAL

## 2024-06-04 DIAGNOSIS — K80.70 CALCULUS OF GALLBLADDER AND BILE DUCT WITHOUT CHOLECYSTITIS OR OBSTRUCTION: ICD-10-CM

## 2024-06-04 DIAGNOSIS — D89.811 GRAFT-VERSUS-HOST DISEASE, CHRONIC (H): ICD-10-CM

## 2024-06-04 DIAGNOSIS — Z94.81 BONE MARROW TRANSPLANT STATUS (H): ICD-10-CM

## 2024-06-04 DIAGNOSIS — K74.69 OTHER CIRRHOSIS OF LIVER (H): ICD-10-CM

## 2024-06-04 DIAGNOSIS — Z79.01 LONG TERM CURRENT USE OF ANTICOAGULANT THERAPY: ICD-10-CM

## 2024-06-04 DIAGNOSIS — Z85.71 HISTORY OF HODGKIN'S LYMPHOMA: ICD-10-CM

## 2024-06-04 PROCEDURE — 93975 VASCULAR STUDY: CPT | Mod: GC | Performed by: STUDENT IN AN ORGANIZED HEALTH CARE EDUCATION/TRAINING PROGRAM

## 2024-06-04 NOTE — PROGRESS NOTES
[facility]: Cancer Care                                                                                          Per request of Dr. Geovani Miller, RN called pt and asked if could move from 6/11 at 10:30 to later in day.      Per Dr. Geovani Miller, ok to change to Wed, 6/12 at 4:30, with labs at ~4:00.  RN updated pt on plan for return on 6/12 as previously discussed.      RN sent information to clinic coordinator team to change appts and send pt [email or web address] message with new appt info.    Signature:  Bonnie Walls RN, OCN

## 2024-06-07 ENCOUNTER — DOCUMENTATION ONLY (OUTPATIENT)
Dept: ANTICOAGULATION | Facility: CLINIC | Age: 74
End: 2024-06-07
Payer: COMMERCIAL

## 2024-06-07 DIAGNOSIS — I48.0 PAROXYSMAL ATRIAL FIBRILLATION (H): ICD-10-CM

## 2024-06-07 DIAGNOSIS — E83.110 HEREDITARY HEMOCHROMATOSIS (H): Primary | ICD-10-CM

## 2024-06-07 DIAGNOSIS — Z79.01 LONG TERM CURRENT USE OF ANTICOAGULANT THERAPY: ICD-10-CM

## 2024-06-07 DIAGNOSIS — E04.1 THYROID NODULE: ICD-10-CM

## 2024-06-07 DIAGNOSIS — Z94.81 BONE MARROW TRANSPLANT STATUS (H): ICD-10-CM

## 2024-06-07 DIAGNOSIS — Z86.711 PERSONAL HISTORY OF PE (PULMONARY EMBOLISM): ICD-10-CM

## 2024-06-07 DIAGNOSIS — Z85.71 HISTORY OF HODGKIN'S LYMPHOMA: ICD-10-CM

## 2024-06-07 DIAGNOSIS — Z79.01 LONG TERM CURRENT USE OF ANTICOAGULANT THERAPY: Primary | ICD-10-CM

## 2024-06-07 DIAGNOSIS — G20.B2 PARKINSON'S DISEASE WITH DYSKINESIA AND FLUCTUATING MANIFESTATIONS (H): ICD-10-CM

## 2024-06-07 LAB — INR HOME MONITORING: 2.5 RATIO (ref 2–3)

## 2024-06-07 NOTE — PROGRESS NOTES
Clinic Note      Haresh returns to the [facility] for history of a JAK2 positive atypical myeloproliferative syndrome, status post nonmyeloablative allo-sib peripheral blood stem cell transplant in 2007, complicated by chronic mjtnc-eoqeyh-ayqf disease, cirrhosis, hemochromatosis with C282Y homozygosity, pulmonary emboli and DVT as well as paroxysmal atrial fibrillation for which he is on warfarin, Hodgkin disease in 2011 s/p ABVD x 5 cycles, and a history of cardiac arrhythmias with an ablation. He was  diagnosed by Dr. Ángel Hill with Parkinson disease, with weight loss, dysphagia with aspiration, requiring G tube placement, orthostasis, urinary obstruction. Most recent CT and PET 2/26/21 were without evidence for recurrence of malignancy. Hypermetabolic activity at the anorectal junction with diffuse wall thickening was worked up by 3/17/21 flex sigmoidoscopy which showed a 4 mm polyp (removed, tubular adenoma), and erythematous mucosa that was biopsied and found superficial vascular congestion and melanosis coli without evidence for GVHD. Had full colonoscopy 4/14/21 without other findings. He had TURP with Dr. Hawley for urinary obstruction 8/9/21, as medical therapy was causing too much orthostasis.     INTERVAL HISTORY  Haresh c/o two episodes of chills followed by flushing at night over the past month. He has dysphagia related to Parkinson's. He can still swallow his morning and midday pills but when he has to take many he crushes them and uses G tube instead. He does not take any food by mouth He continues to drink about 1 cup of Mountain Dew soda each day to help with feelings of dryness and cough.     He continues to experience polyuria and nocturia ( 4-6 x/night).   He has not had a phlebotomy for a while for his iron overload. Today ferritin is at 149 iron sat 53%.  No fever. Weight is stable~ 179. No cough.       PAST MEDICAL HISTORY:  See my note from 02/2024     SOCIAL HISTORY:  See my  note from 02/2024     FAMILY HISTORY:  See my note from  02/2024     REVIEW OF SYSTEMS:    Constitutional: c/o worsening fatigue, has occasional chills as mentioned above. No weight loss or fever  Eyes: no new vision problems  Ears: no new hearing issues  CVS: no chest pain or palpitations  Resp: has SOB at baseline, no new symptoms   GI: no abdominal pain, has occasional loose stools  MSK: Has chronic right hip and knee pain  Skin: no rashes  Neuro: no focal weakness or headaches    /59 (BP Location: Right arm, Patient Position: Sitting, Cuff Size: Adult Regular)   Pulse 72   Temp 98  F (36.7  C) (Oral)   Resp 16   Wt 81.2 kg (179 lb)   SpO2 99%   BMI 24.97 kg/m      PHYSICAL EXAMINATION:    General: appears fatigued  EYES:  Grossly normal to inspection, no discharge, erythema or icterus.   CVS: normal S1 and S2, grade 2/6 systolic murmur.   RESP: normal vesicular breath sounds  ABD: soft, non-tender. Mild hepatomegaly, no splenomegaly.  SKIN:  Skin is clear.  No significant rash or abnormal pigmentation.   NEUROLOGIC:  Cranial nerves  intact.  Mentation and speech is appropriate for age.Grossly normal muscle strength.   PSYCHIATRIC:  Mentation appears normal.  He does not seem anxious today.      Latest Reference Range & Units 06/12/24 15:56   CRP Inflammation <5.00 mg/L 11.60 (H)   Ferritin 31 - 409 ng/mL 149   Iron 61 - 157 ug/dL 122   Iron Binding Capacity 240 - 430 ug/dL 231 (L)   Iron Sat Index 15 - 46 % 53 (H)   TSH 0.30 - 4.20 uIU/mL 2.75   WBC 4.0 - 11.0 10e3/uL 9.2   Hemoglobin 13.3 - 17.7 g/dL 15.7   Hematocrit 40.0 - 53.0 % 43.7   Platelet Count 150 - 450 10e3/uL 90 (L)   RBC Count 4.40 - 5.90 10e6/uL 4.48   MCV 78 - 100 fL 98   MCH 26.5 - 33.0 pg 35.0 (H)   MCHC 31.5 - 36.5 g/dL 35.9   RDW 10.0 - 15.0 % 12.2   % Neutrophils % 68   % Lymphocytes % 24   % Monocytes % 7   % Eosinophils % 1   % Basophils % 0   Absolute Basophils 0.0 - 0.2 10e3/uL 0.0   Absolute Eosinophils 0.0 - 0.7 10e3/uL 0.1    Absolute Immature Granulocytes <=0.4 10e3/uL 0.0   Absolute Lymphocytes 0.8 - 5.3 10e3/uL 2.2   Absolute Monocytes 0.0 - 1.3 10e3/uL 0.7   % Immature Granulocytes % 0   Absolute Neutrophils 1.6 - 8.3 10e3/uL 6.3   Absolute NRBCs 10e3/uL 0.0   NRBCs per 100 WBC <1 /100 0   Sed Rate 0 - 20 mm/hr 71 (H)   INR 0.85 - 1.15  2.64 (H)   (H): Data is abnormally high  (L): Data is abnormally low   Latest Reference Range & Units 06/12/24 15:56   Albumin Fraction 3.7 - 5.1 g/dL 3.6 (L)   Alpha 1 Fraction 0.2 - 0.4 g/dL 0.3   Alpha 2 Fraction 0.5 - 0.9 g/dL 0.7   Beta Fraction 0.6 - 1.0 g/dL 0.9   ELP Interpretation:  Essentially normal electrophoretic pattern. No obvious monoclonal proteins seen. Pathologic significance requires clinical correlation. BOGDAN Nowak M.D., Ph.D., Pathologist.   Gamma Fraction 0.7 - 1.6 g/dL 1.6   Monoclonal Peak <=0.0 g/dL 0.0   Total Protein Serum for ELP 6.4 - 8.3 g/dL 7.1   (L): Data is abnormally low  ASSESSMENT:      Latest Reference Range & Units 06/12/24 15:56   Sodium 135 - 145 mmol/L 133 (L)   Potassium 3.4 - 5.3 mmol/L 5.2   Chloride 98 - 107 mmol/L 99   Carbon Dioxide (CO2) 22 - 29 mmol/L 23   Urea Nitrogen 8.0 - 23.0 mg/dL 33.9 (H)   Creatinine 0.67 - 1.17 mg/dL 1.06   GFR Estimate >60 mL/min/1.73m2 73   Calcium 8.8 - 10.2 mg/dL 8.8   Anion Gap 7 - 15 mmol/L 11   Albumin 3.5 - 5.2 g/dL 3.7   Protein Total 6.4 - 8.3 g/dL 7.3   Alkaline Phosphatase 40 - 150 U/L 109   ALT 0 - 70 U/L 13   AST 0 - 45 U/L 40   Bilirubin Total <=1.2 mg/dL 0.8   CRP Inflammation <5.00 mg/L 11.60 (H)   Ferritin 31 - 409 ng/mL 149   Glucose 70 - 99 mg/dL 219 (H)   Iron 61 - 157 ug/dL 122   Iron Binding Capacity 240 - 430 ug/dL 231 (L)   Iron Sat Index 15 - 46 % 53 (H)   TSH 0.30 - 4.20 uIU/mL 2.75   (L): Data is abnormally low  (H): Data is abnormally high  1.  Myeloproliferative syndrome/MDS. STEVIE 2 positive  2.  Status post non-myeloablative allo-sib peripheral blood stem cell transplant 2007  3.  History of  chronic wtamo-ztthqh-sbez disease.   4.  History of Hodgkin's disease 2011 s/p ABVD x 5 cycles.   5.  History of cardiac arrhythmias, SVT and V tach.   6.  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   7.  History of cirrhosis.   8.  History of pulmonary emboli.   9.  History of elevated hemoglobin A1c.   10. Thyroid nodule.    11. Cholelithiasis.     12. Diverticulosis.   13. Anticoagulation with warfarin for history of DVT, PE, and paroxysmal atrial fibrillation  14. Status post bilateral knee replacement.     15. Aortic stenosis  16. 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  17. Seborrheic keratosis of the back.  18. Weight loss, dysphagia, anorexia related to Parkinson's  19. Parkinson's  20. 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 Chronic aspiration related to dysphagia  22. Thrombocytopenia  23. GERD  24. Chills  25. Worsening urinary frequency     ASSESSMENT:    Haresh's platelet count  is stable at 90k. US of abdomen showed coarse liver echotexture, but no splenomegaly or ascites. He has maintained his weight since Oct 2023 (179lbs today).    His ferritin today is at 149. Will not do a phlebotomy at this time. Continue warfarin, and will adjust with Coumadin clinic   MGUS in past  with Elevated monoclonal peak at 0.2 and elevated kappa/lambda ratio at 2.29. Elevated IgA at 612 mg/dl. At this time no monoclonal peak! Will continue to monitor SPEP kappa/lambda light chains, cell counts and renal function.  Added a CMP to today's labs to monitor liver function, given h/o cirrhosis  We hypothesize his chills and night sweats might be related to dumping syndrome versus bladder distension versus hypoglycemic episodes. Will restart the flomax given the the worsening urinary urgency/frequency and recommend for the patient to follow-up with Urology. We warned about orthostasis and getting up slowly     We will see Haresh again in 6 months with  labs.     I spent a total of 30 minutes face to face with Haresh Rock during today's office visit. Over 50% of this time was spent counseling the patient and coordinating the care regarding Hodgkins and BMT and 10 minutes preparing to see the patient and  care coordination      Augusto Miller MD  [phone]

## 2024-06-07 NOTE — PROGRESS NOTES
ANTICOAGULATION  MANAGEMENT-Home Monitor Managed by Exception    Haresh EMILIE Rock 74 year old male is on warfarin with therapeutic INR result. (Goal INR 2.0-3.0)    Recent labs: (last 7 days)     06/07/24  1246   INR 2.5       Previous INR was Therapeutic  Medication, diet, health changes since last INR:chart reviewed; none identified  Contacted within the last 12 weeks by phone on 5/24/24  Last [facility] referral date: 05/24/2024      MINNIE Hutson was NOT contacted regarding therapeutic result today per home monitoring policy manage by exception agreement.   Current warfarin dose is to be continued:     Summary  As of 6/7/2024      Full warfarin instructions:  3.75 mg every Sun, Tue, Fri; 2.5 mg all other days   Next INR check:  6/14/2024             ?   Shanice Dhillon RN  Anticoagulation Clinic  6/7/2024    _______________________________________________________________________     Anticoagulation Episode Summary       Current INR goal:  2.0-3.0   TTR:  85.1% (1 y)   Target end date:  Indefinite   Send INR reminders to:  [facility]    Indications    Long-term (current) use of anticoagulants [Z79.01] [Z79.01]  Atrial fibrillation (H) (Resolved) [I48.91]  Antiphospholipid antibody syndrome (H24) (Resolved) [D68.61]  Personal history of DVT (deep vein thrombosis) (Resolved) [Z86.718]  Atrial fibrillation  unspecified type (H) (Resolved) [I48.91]  Paroxysmal atrial fibrillation (H) [I48.0]  Chronic atrial fibrillation (H) (Resolved) [I48.20]  Personal history of PE (pulmonary embolism) [Z86.711]             Comments:  JESSICA rodas to manage by exception             Anticoagulation Care Providers       Provider Role Specialty Phone number    Anya Nowak MD Referring Family Medicine [phone]    Elizabeth Balderas MD Referring HOSPITALIST [phone]

## 2024-06-12 ENCOUNTER — APPOINTMENT (OUTPATIENT)
Dept: LAB | Facility: CLINIC | Age: 74
End: 2024-06-12
Attending: INTERNAL MEDICINE
Payer: COMMERCIAL

## 2024-06-12 ENCOUNTER — DOCUMENTATION ONLY (OUTPATIENT)
Dept: ANTICOAGULATION | Facility: CLINIC | Age: 74
End: 2024-06-12

## 2024-06-12 ENCOUNTER — PATIENT OUTREACH (OUTPATIENT)
Dept: ONCOLOGY | Facility: CLINIC | Age: 74
End: 2024-06-12

## 2024-06-12 ENCOUNTER — ONCOLOGY VISIT (OUTPATIENT)
Dept: TRANSPLANT | Facility: CLINIC | Age: 74
End: 2024-06-12
Attending: INTERNAL MEDICINE
Payer: COMMERCIAL

## 2024-06-12 VITALS
RESPIRATION RATE: 16 BRPM | OXYGEN SATURATION: 99 % | BODY MASS INDEX: 24.97 KG/M2 | DIASTOLIC BLOOD PRESSURE: 59 MMHG | HEART RATE: 72 BPM | WEIGHT: 179 LBS | TEMPERATURE: 98 F | SYSTOLIC BLOOD PRESSURE: 121 MMHG

## 2024-06-12 DIAGNOSIS — K80.70 CALCULUS OF GALLBLADDER AND BILE DUCT WITHOUT CHOLECYSTITIS OR OBSTRUCTION: ICD-10-CM

## 2024-06-12 DIAGNOSIS — Z85.71 HISTORY OF HODGKIN'S LYMPHOMA: ICD-10-CM

## 2024-06-12 DIAGNOSIS — E83.110 HEREDITARY HEMOCHROMATOSIS (H): ICD-10-CM

## 2024-06-12 DIAGNOSIS — D89.811 GRAFT-VERSUS-HOST DISEASE, CHRONIC (H): ICD-10-CM

## 2024-06-12 DIAGNOSIS — I48.0 PAROXYSMAL ATRIAL FIBRILLATION (H): ICD-10-CM

## 2024-06-12 DIAGNOSIS — Z94.81 BONE MARROW TRANSPLANT STATUS (H): ICD-10-CM

## 2024-06-12 DIAGNOSIS — G20.B2 PARKINSON'S DISEASE WITH DYSKINESIA AND FLUCTUATING MANIFESTATIONS (H): ICD-10-CM

## 2024-06-12 DIAGNOSIS — Z79.01 LONG TERM CURRENT USE OF ANTICOAGULANT THERAPY: Primary | ICD-10-CM

## 2024-06-12 DIAGNOSIS — Z79.01 LONG TERM CURRENT USE OF ANTICOAGULANT THERAPY: ICD-10-CM

## 2024-06-12 DIAGNOSIS — E04.1 THYROID NODULE: ICD-10-CM

## 2024-06-12 DIAGNOSIS — K74.69 OTHER CIRRHOSIS OF LIVER (H): ICD-10-CM

## 2024-06-12 DIAGNOSIS — Z86.711 PERSONAL HISTORY OF PE (PULMONARY EMBOLISM): ICD-10-CM

## 2024-06-12 LAB
ALBUMIN SERPL BCG-MCNC: 3.7 G/DL (ref 3.5–5.2)
ALP SERPL-CCNC: 109 U/L (ref 40–150)
ALT SERPL W P-5'-P-CCNC: 13 U/L (ref 0–70)
ANION GAP SERPL CALCULATED.3IONS-SCNC: 11 MMOL/L (ref 7–15)
AST SERPL W P-5'-P-CCNC: 40 U/L (ref 0–45)
BASOPHILS # BLD AUTO: 0 10E3/UL (ref 0–0.2)
BASOPHILS NFR BLD AUTO: 0 %
BILIRUB SERPL-MCNC: 0.8 MG/DL
BUN SERPL-MCNC: 33.9 MG/DL (ref 8–23)
CALCIUM SERPL-MCNC: 8.8 MG/DL (ref 8.8–10.2)
CHLORIDE SERPL-SCNC: 99 MMOL/L (ref 98–107)
CREAT SERPL-MCNC: 1.06 MG/DL (ref 0.67–1.17)
CRP SERPL-MCNC: 11.6 MG/L
DEPRECATED HCO3 PLAS-SCNC: 23 MMOL/L (ref 22–29)
EGFRCR SERPLBLD CKD-EPI 2021: 73 ML/MIN/1.73M2
EOSINOPHIL # BLD AUTO: 0.1 10E3/UL (ref 0–0.7)
EOSINOPHIL NFR BLD AUTO: 1 %
ERYTHROCYTE [DISTWIDTH] IN BLOOD BY AUTOMATED COUNT: 12.2 % (ref 10–15)
ERYTHROCYTE [SEDIMENTATION RATE] IN BLOOD BY WESTERGREN METHOD: 71 MM/HR (ref 0–20)
FERRITIN SERPL-MCNC: 149 NG/ML (ref 31–409)
GLUCOSE SERPL-MCNC: 219 MG/DL (ref 70–99)
HCT VFR BLD AUTO: 43.7 % (ref 40–53)
HGB BLD-MCNC: 15.7 G/DL (ref 13.3–17.7)
IMM GRANULOCYTES # BLD: 0 10E3/UL
IMM GRANULOCYTES NFR BLD: 0 %
INR PPP: 2.64 (ref 0.85–1.15)
IRON BINDING CAPACITY (ROCHE): 231 UG/DL (ref 240–430)
IRON SATN MFR SERPL: 53 % (ref 15–46)
IRON SERPL-MCNC: 122 UG/DL (ref 61–157)
LYMPHOCYTES # BLD AUTO: 2.2 10E3/UL (ref 0.8–5.3)
LYMPHOCYTES NFR BLD AUTO: 24 %
MCH RBC QN AUTO: 35 PG (ref 26.5–33)
MCHC RBC AUTO-ENTMCNC: 35.9 G/DL (ref 31.5–36.5)
MCV RBC AUTO: 98 FL (ref 78–100)
MONOCYTES # BLD AUTO: 0.7 10E3/UL (ref 0–1.3)
MONOCYTES NFR BLD AUTO: 7 %
NEUTROPHILS # BLD AUTO: 6.3 10E3/UL (ref 1.6–8.3)
NEUTROPHILS NFR BLD AUTO: 68 %
NRBC # BLD AUTO: 0 10E3/UL
NRBC BLD AUTO-RTO: 0 /100
PLATELET # BLD AUTO: 90 10E3/UL (ref 150–450)
POTASSIUM SERPL-SCNC: 5.2 MMOL/L (ref 3.4–5.3)
PROT SERPL-MCNC: 7.3 G/DL (ref 6.4–8.3)
RBC # BLD AUTO: 4.48 10E6/UL (ref 4.4–5.9)
SODIUM SERPL-SCNC: 133 MMOL/L (ref 135–145)
TSH SERPL DL<=0.005 MIU/L-ACNC: 2.75 UIU/ML (ref 0.3–4.2)
WBC # BLD AUTO: 9.2 10E3/UL (ref 4–11)

## 2024-06-12 PROCEDURE — 84443 ASSAY THYROID STIM HORMONE: CPT | Performed by: INTERNAL MEDICINE

## 2024-06-12 PROCEDURE — 86140 C-REACTIVE PROTEIN: CPT | Performed by: INTERNAL MEDICINE

## 2024-06-12 PROCEDURE — 82728 ASSAY OF FERRITIN: CPT | Performed by: INTERNAL MEDICINE

## 2024-06-12 PROCEDURE — 36415 COLL VENOUS BLD VENIPUNCTURE: CPT | Performed by: INTERNAL MEDICINE

## 2024-06-12 PROCEDURE — G0463 HOSPITAL OUTPT CLINIC VISIT: HCPCS | Performed by: INTERNAL MEDICINE

## 2024-06-12 PROCEDURE — 85652 RBC SED RATE AUTOMATED: CPT | Performed by: INTERNAL MEDICINE

## 2024-06-12 PROCEDURE — 80053 COMPREHEN METABOLIC PANEL: CPT | Performed by: INTERNAL MEDICINE

## 2024-06-12 PROCEDURE — 84165 PROTEIN E-PHORESIS SERUM: CPT | Mod: 26 | Performed by: PATHOLOGY

## 2024-06-12 PROCEDURE — 85610 PROTHROMBIN TIME: CPT | Performed by: INTERNAL MEDICINE

## 2024-06-12 PROCEDURE — 84155 ASSAY OF PROTEIN SERUM: CPT | Mod: 91 | Performed by: INTERNAL MEDICINE

## 2024-06-12 PROCEDURE — 83550 IRON BINDING TEST: CPT | Performed by: INTERNAL MEDICINE

## 2024-06-12 PROCEDURE — 84165 PROTEIN E-PHORESIS SERUM: CPT | Mod: TC | Performed by: PATHOLOGY

## 2024-06-12 PROCEDURE — 85041 AUTOMATED RBC COUNT: CPT | Performed by: INTERNAL MEDICINE

## 2024-06-12 PROCEDURE — 99214 OFFICE O/P EST MOD 30 MIN: CPT | Performed by: INTERNAL MEDICINE

## 2024-06-12 PROCEDURE — 83540 ASSAY OF IRON: CPT | Performed by: INTERNAL MEDICINE

## 2024-06-12 RX ORDER — FAMOTIDINE 20 MG/1
20 TABLET, FILM COATED ORAL
COMMUNITY
End: 2024-07-23

## 2024-06-12 RX ORDER — TAMSULOSIN HYDROCHLORIDE 0.4 MG/1
0.4 CAPSULE ORAL DAILY
Qty: 30 CAPSULE | Refills: 3 | Status: SHIPPED | OUTPATIENT
Start: 2024-06-12

## 2024-06-12 ASSESSMENT — PAIN SCALES - GENERAL: PAINLEVEL: NO PAIN (0)

## 2024-06-12 NOTE — NURSING NOTE
"Oncology Rooming Note    June 12, 2024 4:31 PM   Haresh Rock is a 74 year old male who presents for:    Chief Complaint   Patient presents with    Blood Draw     Labs drawn with  by rn.  VS taken.    Oncology Clinic Visit     Hodgkin's Lymphoma     Initial Vitals: /59 (BP Location: Right arm, Patient Position: Sitting, Cuff Size: Adult Regular)   Pulse 72   Temp 98  F (36.7  C) (Oral)   Resp 16   Wt 81.2 kg (179 lb)   SpO2 99%   BMI 24.97 kg/m   Estimated body mass index is 24.97 kg/m  as calculated from the following:    Height as of 4/29/24: 1.803 m (5' 11\").    Weight as of this encounter: 81.2 kg (179 lb). Body surface area is 2.02 meters squared.  No Pain (0) Comment: Data Unavailable   No LMP for male patient.  Allergies reviewed: Yes  Medications reviewed: Yes    Medications: Medication refills not needed today.  Pharmacy name entered into [employer]:    [id number] - [place] - [address] [facility] [address] [facility] - [place] - [address] [facility]    Frailty Screening:   Is the patient here for a new oncology consult visit in cancer care? 2. No      Clinical concerns: None       Jacquie Ibarra LPN  6/12/2024              "

## 2024-06-12 NOTE — LETTER
6/12/2024      Haresh Rock  [address] [place] [address]      Dear Colleague,    Thank you for referring your patient, Haresh Rock, to the [facility] [place]. Please see a copy of my visit note below.    Clinic Note      Haresh returns to the [facility] for history of a JAK2 positive atypical myeloproliferative syndrome, status post nonmyeloablative allo-sib peripheral blood stem cell transplant in 2007, complicated by chronic suvlu-rpokhn-ncgq disease, cirrhosis, hemochromatosis with C282Y homozygosity, pulmonary emboli and DVT as well as paroxysmal atrial fibrillation for which he is on warfarin, Hodgkin disease in 2011 s/p ABVD x 5 cycles, and a history of cardiac arrhythmias with an ablation. He was  diagnosed by Dr. Ángel Hill with Parkinson disease, with weight loss, dysphagia with aspiration, requiring G tube placement, orthostasis, urinary obstruction. Most recent CT and PET 2/26/21 were without evidence for recurrence of malignancy. Hypermetabolic activity at the anorectal junction with diffuse wall thickening was worked up by 3/17/21 flex sigmoidoscopy which showed a 4 mm polyp (removed, tubular adenoma), and erythematous mucosa that was biopsied and found superficial vascular congestion and melanosis coli without evidence for GVHD. Had full colonoscopy 4/14/21 without other findings. He had TURP with Dr. Hawley for urinary obstruction 8/9/21, as medical therapy was causing too much orthostasis.     INTERVAL HISTORY  Haresh c/o two episodes of chills followed by flushing at night over the past month. He has dysphagia related to Parkinson's. He can still swallow his morning and midday pills but when he has to take many he crushes them and uses G tube instead. He does not take any food by mouth He continues to drink about 1 cup of Mountain Dew soda each day to help with feelings of dryness and cough.     He continues to experience  polyuria and nocturia ( 4-6 x/night).   He has not had a phlebotomy for a while for his iron overload. Today ferritin is at 149 iron sat 53%.  No fever. Weight is stable~ 179. No cough.       PAST MEDICAL HISTORY:  See my note from 02/2024     SOCIAL HISTORY:  See my note from 02/2024     FAMILY HISTORY:  See my note from  02/2024     REVIEW OF SYSTEMS:    Constitutional: c/o worsening fatigue, has occasional chills as mentioned above. No weight loss or fever  Eyes: no new vision problems  Ears: no new hearing issues  CVS: no chest pain or palpitations  Resp: has SOB at baseline, no new symptoms   GI: no abdominal pain, has occasional loose stools  MSK: Has chronic right hip and knee pain  Skin: no rashes  Neuro: no focal weakness or headaches    /59 (BP Location: Right arm, Patient Position: Sitting, Cuff Size: Adult Regular)   Pulse 72   Temp 98  F (36.7  C) (Oral)   Resp 16   Wt 81.2 kg (179 lb)   SpO2 99%   BMI 24.97 kg/m      PHYSICAL EXAMINATION:    General: appears fatigued  EYES:  Grossly normal to inspection, no discharge, erythema or icterus.   CVS: normal S1 and S2, grade 2/6 systolic murmur.   RESP: normal vesicular breath sounds  ABD: soft, non-tender. Mild hepatomegaly, no splenomegaly.  SKIN:  Skin is clear.  No significant rash or abnormal pigmentation.   NEUROLOGIC:  Cranial nerves  intact.  Mentation and speech is appropriate for age.Grossly normal muscle strength.   PSYCHIATRIC:  Mentation appears normal.  He does not seem anxious today.      Latest Reference Range & Units 06/12/24 15:56   CRP Inflammation <5.00 mg/L 11.60 (H)   Ferritin 31 - 409 ng/mL 149   Iron 61 - 157 ug/dL 122   Iron Binding Capacity 240 - 430 ug/dL 231 (L)   Iron Sat Index 15 - 46 % 53 (H)   TSH 0.30 - 4.20 uIU/mL 2.75   WBC 4.0 - 11.0 10e3/uL 9.2   Hemoglobin 13.3 - 17.7 g/dL 15.7   Hematocrit 40.0 - 53.0 % 43.7   Platelet Count 150 - 450 10e3/uL 90 (L)   RBC Count 4.40 - 5.90 10e6/uL 4.48   MCV 78 - 100 fL 98    MCH 26.5 - 33.0 pg 35.0 (H)   MCHC 31.5 - 36.5 g/dL 35.9   RDW 10.0 - 15.0 % 12.2   % Neutrophils % 68   % Lymphocytes % 24   % Monocytes % 7   % Eosinophils % 1   % Basophils % 0   Absolute Basophils 0.0 - 0.2 10e3/uL 0.0   Absolute Eosinophils 0.0 - 0.7 10e3/uL 0.1   Absolute Immature Granulocytes <=0.4 10e3/uL 0.0   Absolute Lymphocytes 0.8 - 5.3 10e3/uL 2.2   Absolute Monocytes 0.0 - 1.3 10e3/uL 0.7   % Immature Granulocytes % 0   Absolute Neutrophils 1.6 - 8.3 10e3/uL 6.3   Absolute NRBCs 10e3/uL 0.0   NRBCs per 100 WBC <1 /100 0   Sed Rate 0 - 20 mm/hr 71 (H)   INR 0.85 - 1.15  2.64 (H)   (H): Data is abnormally high  (L): Data is abnormally low    ASSESSMENT:     1.  Myeloproliferative syndrome/MDS. STEVIE 2 positive  2.  Status post non-myeloablative allo-sib peripheral blood stem cell transplant 2007  3.  History of chronic wtiks-ynhegv-zcjt disease.   4.  History of Hodgkin's disease 2011 s/p ABVD x 5 cycles.   5.  History of cardiac arrhythmias, SVT and V tach.   6.  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   7.  History of cirrhosis.   8.  History of pulmonary emboli.   9.  History of elevated hemoglobin A1c.   10. Thyroid nodule.    11. Cholelithiasis.     12. Diverticulosis.   13. Anticoagulation with warfarin for history of DVT, PE, and paroxysmal atrial fibrillation  14. Status post bilateral knee replacement.     15. Aortic stenosis  16. 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  17. Seborrheic keratosis of the back.  18. Weight loss, dysphagia, anorexia related to Parkinson's  19. Parkinson's  20. 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 Chronic aspiration related to dysphagia  22. Thrombocytopenia  23. GERD  24. MGUS  25. Chills  26. Worsening urinary frequency     ASSESSMENT:    Haresh's platelet count  is stable at 90k. US of abdomen showed coarse liver echotexture, but no splenomegaly or ascites. He has maintained his  weight since Oct 2023 (179lbs today).    His ferritin today is at 149. Will not do a phlebotomy at this time. Continue warfarin, and will adjust with Coumadin clinic   MGUS is new with Elevated monoclonal peak at 0.2 and elevated kappa/lambda ratio at 2.29. Elevated IgA at 612 mg/dl. Will continue to monitor cell counts and renal function.  Added a CMP to today's labs to monitor liver function, given h/o cirrhosis  We hypothesize his chills and night sweats might be related to dumping syndrome versus bladder distension versus hypoglycemic episodes. Will restart the flomax given the the worsening urinary urgency/frequency and recommend for the patient to follow-up with Urology. We warned about orthostasis and getting up slowly     We will see Haresh again in 6 months with labs.     I spent a total of 30 minutes face to face with Haresh Rock during today's office visit. Over 50% of this time was spent counseling the patient and coordinating the care regarding Hodgkins and BMT and 10 minutes preparing to see the patient and  care coordination      Augusto Miller MD  [phone]

## 2024-06-12 NOTE — PROGRESS NOTES
ANTICOAGULATION  MANAGEMENT-Home Monitor Managed by Exception    Haresh PHAN Pranav 74 year old male is on warfarin with therapeutic INR result. (Goal INR 2.0-3.0)    Recent labs: (last 7 days)     06/12/24  1556   INR 2.64*       Previous INR was Therapeutic  Medication, diet, health changes since last INR:chart reviewed; none identified  Contacted within the last 12 weeks by phone on 5/24/24  Last [facility] referral date: 05/24/2024  Today's venous INR was added onto existing labs. Plan for patient to check with home meter at the end of the week.       PLAN     Haresh was NOT contacted regarding therapeutic result today per home monitoring policy manage by exception agreement.   Current warfarin dose is to be continued:     Summary  As of 6/12/2024      Full warfarin instructions:  3.75 mg every Sun, Tue, Fri; 2.5 mg all other days   Next INR check:  6/14/2024             ?   Phyllis Tello RN  Anticoagulation Clinic  6/12/2024    _______________________________________________________________________     Anticoagulation Episode Summary       Current INR goal:  2.0-3.0   TTR:  85.1% (1 y)   Target end date:  Indefinite   Send INR reminders to:  [facility]    Indications    Long-term (current) use of anticoagulants [Z79.01] [Z79.01]  Atrial fibrillation (H) (Resolved) [I48.91]  Antiphospholipid antibody syndrome (H24) (Resolved) [D68.61]  Personal history of DVT (deep vein thrombosis) (Resolved) [Z86.718]  Atrial fibrillation  unspecified type (H) (Resolved) [I48.91]  Paroxysmal atrial fibrillation (H) [I48.0]  Chronic atrial fibrillation (H) (Resolved) [I48.20]  Personal history of PE (pulmonary embolism) [Z86.711]             Comments:  JESSICA rodas to manage by exception             Anticoagulation Care Providers       Provider Role Specialty Phone number    Anya Nowak MD Referring Family Medicine [phone]    Elizabeth Balderas MD Referring HOSPITALIST [phone]

## 2024-06-13 LAB
ALBUMIN SERPL ELPH-MCNC: 3.6 G/DL (ref 3.7–5.1)
ALPHA1 GLOB SERPL ELPH-MCNC: 0.3 G/DL (ref 0.2–0.4)
ALPHA2 GLOB SERPL ELPH-MCNC: 0.7 G/DL (ref 0.5–0.9)
B-GLOBULIN SERPL ELPH-MCNC: 0.9 G/DL (ref 0.6–1)
GAMMA GLOB SERPL ELPH-MCNC: 1.6 G/DL (ref 0.7–1.6)
M PROTEIN SERPL ELPH-MCNC: 0 G/DL
PROT PATTERN SERPL ELPH-IMP: ABNORMAL
TOTAL PROTEIN SERUM FOR ELP: 7.1 G/DL (ref 6.4–8.3)

## 2024-06-14 ENCOUNTER — DOCUMENTATION ONLY (OUTPATIENT)
Dept: ANTICOAGULATION | Facility: CLINIC | Age: 74
End: 2024-06-14
Payer: COMMERCIAL

## 2024-06-14 DIAGNOSIS — I48.0 PAROXYSMAL ATRIAL FIBRILLATION (H): ICD-10-CM

## 2024-06-14 DIAGNOSIS — Z86.711 PERSONAL HISTORY OF PE (PULMONARY EMBOLISM): ICD-10-CM

## 2024-06-14 DIAGNOSIS — Z79.01 LONG TERM CURRENT USE OF ANTICOAGULANT THERAPY: Primary | ICD-10-CM

## 2024-06-14 LAB — INR HOME MONITORING: 2.1 RATIO (ref 2–3)

## 2024-06-14 NOTE — PROGRESS NOTES
ANTICOAGULATION  MANAGEMENT-Home Monitor Managed by Exception    Haresh EMILIE Rock 74 year old male is on warfarin with therapeutic INR result. (Goal INR 2.0-3.0)    Recent labs: (last 7 days)     06/14/24  1216   INR 2.1       Previous INR was Therapeutic  Medication, diet, health changes since last INR:chart reviewed; none identified  Contacted within the last 12 weeks by phone on 5/24/24  Last [facility] referral date: 05/24/2024      MINNIE Hutson was NOT contacted regarding therapeutic result today per home monitoring policy manage by exception agreement.   Current warfarin dose is to be continued:     Summary  As of 6/14/2024      Full warfarin instructions:  3.75 mg every Sun, Tue, Fri; 2.5 mg all other days   Next INR check:  6/21/2024             ?   Akilah Sharp RN  Anticoagulation Clinic  6/14/2024    _______________________________________________________________________     Anticoagulation Episode Summary       Current INR goal:  2.0-3.0   TTR:  85.1% (1 y)   Target end date:  Indefinite   Send INR reminders to:  [facility]    Indications    Long-term (current) use of anticoagulants [Z79.01] [Z79.01]  Atrial fibrillation (H) (Resolved) [I48.91]  Antiphospholipid antibody syndrome (H24) (Resolved) [D68.61]  Personal history of DVT (deep vein thrombosis) (Resolved) [Z86.718]  Atrial fibrillation  unspecified type (H) (Resolved) [I48.91]  Paroxysmal atrial fibrillation (H) [I48.0]  Chronic atrial fibrillation (H) (Resolved) [I48.20]  Personal history of PE (pulmonary embolism) [Z86.711]             Comments:  JESSICA rodas to manage by exception             Anticoagulation Care Providers       Provider Role Specialty Phone number    Anya Nowak MD Referring Family Medicine [phone]    Elizabeth Balderas MD Referring HOSPITALIST [phone]

## 2024-06-21 ENCOUNTER — PATIENT OUTREACH (OUTPATIENT)
Dept: CARE COORDINATION | Facility: CLINIC | Age: 74
End: 2024-06-21
Payer: COMMERCIAL

## 2024-06-21 ENCOUNTER — DOCUMENTATION ONLY (OUTPATIENT)
Dept: ANTICOAGULATION | Facility: CLINIC | Age: 74
End: 2024-06-21
Payer: COMMERCIAL

## 2024-06-21 DIAGNOSIS — I48.0 PAROXYSMAL ATRIAL FIBRILLATION (H): ICD-10-CM

## 2024-06-21 DIAGNOSIS — Z86.711 PERSONAL HISTORY OF PE (PULMONARY EMBOLISM): ICD-10-CM

## 2024-06-21 DIAGNOSIS — Z79.01 LONG TERM CURRENT USE OF ANTICOAGULANT THERAPY: Primary | ICD-10-CM

## 2024-06-21 LAB — INR HOME MONITORING: 2.3 RATIO (ref 2–3)

## 2024-06-21 NOTE — PROGRESS NOTES
Clinic Care Coordination Contact    Assessment: Care Coordinator contacted patient for 2 month follow up.  Patient has continued to follow the plan of care and assessment is negative for any new needs or concerns.    Enrollment status: Graduated.      Plan: No further outreaches at this time.  Patient will continue to follow the plan of care.  If new needs arise a new Care Coordination referral may be placed.  FYI to PCP    Uriel Zhou MSN, RN, PHN, CCM   Primary Care Clinical RN Care Coordinator  [facility]  6/21/2024   3:45 PM  [email or web address]  Office: [phone]

## 2024-06-21 NOTE — PROGRESS NOTES
ANTICOAGULATION  MANAGEMENT-Home Monitor Managed by Exception    Haresh EMILIE Rock 74 year old male is on warfarin with therapeutic INR result. (Goal INR 2.0-3.0)    Recent labs: (last 7 days)     06/21/24  1249   INR 2.3       Previous INR was Therapeutic  Medication, diet, health changes since last INR:chart reviewed; none identified  Contacted within the last 12 weeks by phone on 5/24/24  Last [facility] referral date: 05/24/2024      MINNIE Hutson was NOT contacted regarding therapeutic result today per home monitoring policy manage by exception agreement.   Current warfarin dose is to be continued:     Summary  As of 6/21/2024      Full warfarin instructions:  3.75 mg every Sun, Tue, Fri; 2.5 mg all other days   Next INR check:  6/28/2024             ?   Shanice Hyde RN  Anticoagulation Clinic  6/21/2024    _______________________________________________________________________     Anticoagulation Episode Summary       Current INR goal:  2.0-3.0   TTR:  85.1% (1 y)   Target end date:  Indefinite   Send INR reminders to:  [facility]    Indications    Long-term (current) use of anticoagulants [Z79.01] [Z79.01]  Atrial fibrillation (H) (Resolved) [I48.91]  Antiphospholipid antibody syndrome (H24) (Resolved) [D68.61]  Personal history of DVT (deep vein thrombosis) (Resolved) [Z86.718]  Atrial fibrillation  unspecified type (H) (Resolved) [I48.91]  Paroxysmal atrial fibrillation (H) [I48.0]  Chronic atrial fibrillation (H) (Resolved) [I48.20]  Personal history of PE (pulmonary embolism) [Z86.711]             Comments:  JESSICA rodas to manage by exception             Anticoagulation Care Providers       Provider Role Specialty Phone number    Anya Nowak MD Referring Family Medicine [phone]    Elizabeth Balderas MD Referring HOSPITALIST [phone]

## 2024-06-28 ENCOUNTER — DOCUMENTATION ONLY (OUTPATIENT)
Dept: ANTICOAGULATION | Facility: CLINIC | Age: 74
End: 2024-06-28
Payer: COMMERCIAL

## 2024-06-28 DIAGNOSIS — I48.0 PAROXYSMAL ATRIAL FIBRILLATION (H): ICD-10-CM

## 2024-06-28 DIAGNOSIS — Z79.01 LONG TERM CURRENT USE OF ANTICOAGULANT THERAPY: Primary | ICD-10-CM

## 2024-06-28 DIAGNOSIS — Z86.711 PERSONAL HISTORY OF PE (PULMONARY EMBOLISM): ICD-10-CM

## 2024-06-28 LAB — INR HOME MONITORING: 2.2 RATIO (ref 2–3)

## 2024-06-28 NOTE — PROGRESS NOTES
ANTICOAGULATION  MANAGEMENT-Home Monitor Managed by Exception    Haresh PHAN Pranav 74 year old male is on warfarin with therapeutic INR result. (Goal INR 2.0-3.0)    Recent labs: (last 7 days)     06/28/24  1516   INR 2.2       Previous INR was Therapeutic  Medication, diet, health changes since last INR:chart reviewed; none identified  Contacted within the last 12 weeks by phone on 5/24/24 (~ 12 weeks 8/16)  Last [facility] referral date: 05/24/2024      MINNIE Hutson was NOT contacted regarding therapeutic result today per home monitoring policy manage by exception agreement.   Current warfarin dose is to be continued:     Summary  As of 6/28/2024      Full warfarin instructions:  3.75 mg every Sun, Tue, Fri; 2.5 mg all other days   Next INR check:  7/5/2024             ?   Milvia Martinez RN  Anticoagulation Clinic  6/28/2024    _______________________________________________________________________     Anticoagulation Episode Summary       Current INR goal:  2.0-3.0   TTR:  85.1% (1 y)   Target end date:  Indefinite   Send INR reminders to:  [facility]    Indications    Long-term (current) use of anticoagulants [Z79.01] [Z79.01]  Atrial fibrillation (H) (Resolved) [I48.91]  Antiphospholipid antibody syndrome (H24) (Resolved) [D68.61]  Personal history of DVT (deep vein thrombosis) (Resolved) [Z86.718]  Atrial fibrillation  unspecified type (H) (Resolved) [I48.91]  Paroxysmal atrial fibrillation (H) [I48.0]  Chronic atrial fibrillation (H) (Resolved) [I48.20]  Personal history of PE (pulmonary embolism) [Z86.711]             Comments:  JESSICA rodas to manage by exception             Anticoagulation Care Providers       Provider Role Specialty Phone number    Anya Nowak MD Referring Family Medicine [phone]    Elizabeth Balderas MD Referring HOSPITALIST [phone]

## 2024-07-05 ENCOUNTER — ANTICOAGULATION THERAPY VISIT (OUTPATIENT)
Dept: ANTICOAGULATION | Facility: CLINIC | Age: 74
End: 2024-07-05
Payer: COMMERCIAL

## 2024-07-05 DIAGNOSIS — I48.0 PAROXYSMAL ATRIAL FIBRILLATION (H): ICD-10-CM

## 2024-07-05 DIAGNOSIS — Z79.01 LONG TERM CURRENT USE OF ANTICOAGULANT THERAPY: Primary | ICD-10-CM

## 2024-07-05 DIAGNOSIS — Z86.711 PERSONAL HISTORY OF PE (PULMONARY EMBOLISM): ICD-10-CM

## 2024-07-05 LAB — INR HOME MONITORING: 1.9 RATIO (ref 2–3)

## 2024-07-05 NOTE — PROGRESS NOTES
ANTICOAGULATION MANAGEMENT     Haresh Rock 74 year old male is on warfarin with subtherapeutic INR result. (Goal INR 2.0-3.0)    Recent labs: (last 7 days)     07/05/24  1354   INR 1.9*       ASSESSMENT     Source(s): Chart Review and Patient/Caregiver Call     Warfarin doses taken: Warfarin taken as instructed  Diet: No new diet changes identified  Medication/supplement changes:  Vicks as needed use per patient he took 2 doses  coricidin as needed use No interaction anticipated  New illness, injury, or hospitalization: Yes: cold symptoms, stated he feels better   Signs or symptoms of bleeding or clotting: No  Previous result: Therapeutic last 2(+) visits  Additional findings: None       PLAN     Recommended plan for temporary change(s) affecting INR     Dosing Instructions: Continue your current warfarin dose with next INR in 1 week       Summary  As of 7/5/2024      Full warfarin instructions:  3.75 mg every Sun, Tue, Fri; 2.5 mg all other days   Next INR check:  7/12/2024               Telephone call with Haresh who verbalizes understanding and agrees to plan and who agrees to plan and repeated back plan correctly    Patient to recheck with home meter    Education provided: Contact [phone] with any changes, questions or concerns.     Plan made per [facility] anticoagulation protocol    Shanice Dhillon RN  Anticoagulation Clinic  7/5/2024    _______________________________________________________________________     Anticoagulation Episode Summary       Current INR goal:  2.0-3.0   TTR:  84.5% (1 y)   Target end date:  Indefinite   Send INR reminders to:  [facility]    Indications    Long-term (current) use of anticoagulants [Z79.01] [Z79.01]  Atrial fibrillation (H) (Resolved) [I48.91]  Antiphospholipid antibody syndrome (H24) (Resolved) [D68.61]  Personal history of DVT (deep vein thrombosis) (Resolved) [Z86.718]  Atrial fibrillation  unspecified type (H) (Resolved) [I48.91]  Paroxysmal atrial  fibrillation (H) [I48.0]  Chronic atrial fibrillation (H) (Resolved) [I48.20]  Personal history of PE (pulmonary embolism) [Z86.711]             Comments:  JESSICA rodas to manage by exception             Anticoagulation Care Providers       Provider Role Specialty Phone number    Anya Nowak MD Referring Family Medicine [phone]    Elizabeth Balderas MD Referring HOSPITALIST [phone]

## 2024-07-08 DIAGNOSIS — I48.0 PAROXYSMAL ATRIAL FIBRILLATION (H): ICD-10-CM

## 2024-07-08 RX ORDER — METOPROLOL TARTRATE 25 MG/1
TABLET, FILM COATED ORAL
Qty: 90 TABLET | Refills: 0 | Status: SHIPPED | OUTPATIENT
Start: 2024-07-08 | End: 2024-10-07

## 2024-07-12 ENCOUNTER — ANTICOAGULATION THERAPY VISIT (OUTPATIENT)
Dept: ANTICOAGULATION | Facility: CLINIC | Age: 74
End: 2024-07-12
Payer: COMMERCIAL

## 2024-07-12 DIAGNOSIS — Z86.711 PERSONAL HISTORY OF PE (PULMONARY EMBOLISM): ICD-10-CM

## 2024-07-12 DIAGNOSIS — Z79.01 LONG TERM CURRENT USE OF ANTICOAGULANT THERAPY: Primary | ICD-10-CM

## 2024-07-12 DIAGNOSIS — I48.0 PAROXYSMAL ATRIAL FIBRILLATION (H): ICD-10-CM

## 2024-07-12 LAB — INR HOME MONITORING: 2.3 RATIO (ref 2–3)

## 2024-07-12 NOTE — PROGRESS NOTES
ANTICOAGULATION MANAGEMENT     Haresh Rock 74 year old male is on warfarin with therapeutic INR result. (Goal INR 2.0-3.0)    Recent labs: (last 7 days)     07/12/24  1256   INR 2.3       ASSESSMENT     Source(s): Chart Review and Patient/Caregiver Call     Warfarin doses taken: Warfarin taken as instructed  Diet: No new diet changes identified  Medication/supplement changes: None noted  New illness, injury, or hospitalization: No  Signs or symptoms of bleeding or clotting: No  Previous result: Subtherapeutic  Additional findings: None       PLAN     Recommended plan for no diet, medication or health factor changes affecting INR     Dosing Instructions: Continue your current warfarin dose with next INR in 1 week       Summary  As of 7/12/2024      Full warfarin instructions:  3.75 mg every Sun, Tue, Fri; 2.5 mg all other days   Next INR check:  7/19/2024               Telephone call with Haresh who verbalizes understanding and agrees to plan    Patient to recheck with home meter    Education provided: None required    Plan made per [facility] anticoagulation protocol    Aleksandra Rutledge, RN  Anticoagulation Clinic  7/12/2024    _______________________________________________________________________     Anticoagulation Episode Summary       Current INR goal:  2.0-3.0   TTR:  84.0% (1 y)   Target end date:  Indefinite   Send INR reminders to:  [facility]    Indications    Long-term (current) use of anticoagulants [Z79.01] [Z79.01]  Atrial fibrillation (H) (Resolved) [I48.91]  Antiphospholipid antibody syndrome (H24) (Resolved) [D68.61]  Personal history of DVT (deep vein thrombosis) (Resolved) [Z86.718]  Atrial fibrillation  unspecified type (H) (Resolved) [I48.91]  Paroxysmal atrial fibrillation (H) [I48.0]  Chronic atrial fibrillation (H) (Resolved) [I48.20]  Personal history of PE (pulmonary embolism) [Z86.711]             Comments:  JESSICA villafuerteay to manage by exception             Anticoagulation Care  Providers       Provider Role Specialty Phone number    Anya Nowak MD Referring Family Medicine [phone]    Elizabeth Balderas MD Referring HOSPITALIST [phone]

## 2024-07-16 ENCOUNTER — MYC MEDICAL ADVICE (OUTPATIENT)
Dept: FAMILY MEDICINE | Facility: CLINIC | Age: 74
End: 2024-07-16
Payer: COMMERCIAL

## 2024-07-16 DIAGNOSIS — Z23 NEED FOR SHINGLES VACCINE: ICD-10-CM

## 2024-07-16 DIAGNOSIS — Z23 NEED FOR TDAP VACCINATION: ICD-10-CM

## 2024-07-16 DIAGNOSIS — Z23 NEED FOR PROPHYLACTIC VACCINATION AGAINST HEPATITIS A: ICD-10-CM

## 2024-07-22 ENCOUNTER — ANTICOAGULATION THERAPY VISIT (OUTPATIENT)
Dept: ANTICOAGULATION | Facility: CLINIC | Age: 74
End: 2024-07-22
Payer: COMMERCIAL

## 2024-07-22 DIAGNOSIS — Z79.01 LONG TERM CURRENT USE OF ANTICOAGULANT THERAPY: Primary | ICD-10-CM

## 2024-07-22 DIAGNOSIS — I48.0 PAROXYSMAL ATRIAL FIBRILLATION (H): ICD-10-CM

## 2024-07-22 DIAGNOSIS — Z86.711 PERSONAL HISTORY OF PE (PULMONARY EMBOLISM): ICD-10-CM

## 2024-07-22 LAB — INR HOME MONITORING: 2.6 RATIO (ref 2–3)

## 2024-07-22 NOTE — PROGRESS NOTES
ANTICOAGULATION MANAGEMENT     Haresh Rock 74 year old male is on warfarin with therapeutic INR result. (Goal INR 2.0-3.0)    Recent labs: (last 7 days)     07/22/24  1330   INR 2.6       ASSESSMENT     Source(s): Chart Review and Patient/Caregiver Call     Warfarin doses taken: Warfarin taken as instructed  Diet: No new diet changes identified  Medication/supplement changes: None noted  New illness, injury, or hospitalization: No  Signs or symptoms of bleeding or clotting: No  Previous result: Therapeutic last visit; previously outside of goal range  Additional findings: None       PLAN     Recommended plan for no diet, medication or health factor changes affecting INR     Dosing Instructions: Continue your current warfarin dose with next INR in 2 weeks       Summary  As of 7/22/2024      Full warfarin instructions:  3.75 mg every Sun, Tue, Fri; 2.5 mg all other days   Next INR check:  8/5/2024               Telephone call with Haresh who agrees to plan and repeated back plan correctly    Patient to recheck with home meter    Education provided: Please call back if any changes to your diet, medications or how you've been taking warfarin  Goal range and lab monitoring: goal range and significance of current result  Resume manage by exception with home monitor. Continue to submit INR results to home monitor company.You will only be called when your result is out of range. Please call and notify [facility] if new medication started, dose missed, signs or symptoms of bleeding or clotting, or a surgery/procedure is scheduled. Due for next call no later than: 10/20/24.    Plan made per [facility] anticoagulation protocol    Gabriela Sosa RN  Anticoagulation Clinic  7/22/2024    _______________________________________________________________________     Anticoagulation Episode Summary       Current INR goal:  2.0-3.0   TTR:  84.0% (1 y)   Target end date:  Indefinite   Send INR reminders to:  [facility]    Indications     Long-term (current) use of anticoagulants [Z79.01] [Z79.01]  Atrial fibrillation (H) (Resolved) [I48.91]  Antiphospholipid antibody syndrome (H24) (Resolved) [D68.61]  Personal history of DVT (deep vein thrombosis) (Resolved) [Z86.718]  Atrial fibrillation  unspecified type (H) (Resolved) [I48.91]  Paroxysmal atrial fibrillation (H) [I48.0]  Chronic atrial fibrillation (H) (Resolved) [I48.20]  Personal history of PE (pulmonary embolism) [Z86.711]             Comments:  JESSICA rodas to manage by exception             Anticoagulation Care Providers       Provider Role Specialty Phone number    Anya Nowak MD Referring Family Medicine [phone]    Elizabeth Balderas MD Referring HOSPITALIST [phone]

## 2024-07-23 ENCOUNTER — VIRTUAL VISIT (OUTPATIENT)
Dept: NEUROLOGY | Facility: CLINIC | Age: 74
End: 2024-07-23
Attending: PSYCHIATRY & NEUROLOGY
Payer: COMMERCIAL

## 2024-07-23 DIAGNOSIS — G20.A1 PARKINSON'S DISEASE, UNSPECIFIED WHETHER DYSKINESIA PRESENT, UNSPECIFIED WHETHER MANIFESTATIONS FLUCTUATE (H): Primary | ICD-10-CM

## 2024-07-23 DIAGNOSIS — Z93.1 GASTROSTOMY TUBE IN PLACE (H): ICD-10-CM

## 2024-07-23 DIAGNOSIS — N40.0 BENIGN PROSTATIC HYPERPLASIA, UNSPECIFIED WHETHER LOWER URINARY TRACT SYMPTOMS PRESENT: ICD-10-CM

## 2024-07-23 DIAGNOSIS — K21.9 GASTROESOPHAGEAL REFLUX DISEASE, UNSPECIFIED WHETHER ESOPHAGITIS PRESENT: ICD-10-CM

## 2024-07-23 DIAGNOSIS — Z71.85 VACCINE COUNSELING: ICD-10-CM

## 2024-07-23 PROCEDURE — 99207 PR NO BILLABLE SERVICE THIS VISIT: CPT | Mod: 95 | Performed by: PHARMACIST

## 2024-07-23 NOTE — Clinical Note
7/23/2024       RE: Haresh Rock  [address] [facility] [address]     Dear Colleague,    Thank you for referring your patient, Haresh Rock, to the [facility] [place] [facility]. Please see a copy of my visit note below.    Medication Therapy Management (MTM) Encounter    ASSESSMENT:                            Medication Adherence/Access: {adherencechoices:751973}    ***:   ***    PLAN:                            Flomax for 2 weeks then stop if not helping.  Hold off on Shingles - Leslie to talk with Dr. Nowak   You could get the Tdap and Hep A    Follow-up: 1/21/25 at 11:30 am video visit     SUBJECTIVE/OBJECTIVE:                          Haresh Rock is a 74 year old male seen for a follow-up visit.       Reason for visit: follow up on medications.    Allergies/ADRs: Reviewed in chart  Past Medical History: Reviewed in chart  Tobacco: He reports that he has never smoked. He has never used smokeless tobacco.  Alcohol: {ALCOHOL CONSUMPTION HX:711957}    Medication Adherence/Access:   8-9 am- swallow morning pills by mouth- flecainide, carbidopa-levodopa, omeprazole, metoprolol   9-11 am- morning feeding for 45 minutes (at least 1 hour after morning medication)  12 pm- nap (at least 1 hour after feeding is completed) & loratadine ODT (by mouth)  2 pm- carbidopa-levodopa crushed via G-tube  3-4 pm- afternoon feeding for 45 minutes  7-7:30 pm- evening pills crushed via G-tube- warfarin, flecainide, rosuvastatin, carbidopa-levodopa   9 pm- evening feeding for 45 minutes      Switching tubes every 5-6 months.      Parkinson's Disease:    - carbidopa-levodopa  mg, 1 tablet 3 times daily (8-9 am, 2 pm, 7-7:30 pm)  Increased the carbidopa-levodopa at 2 pm- has helped a little bit. Has pretty minimal tremor.     GERD:   - omeprazole 20 mg every morning  - famotidine 20 mg at 7 pm? - stopped. Still getting reflux. Not acid  reflux but more of the food reflux.     Try adding Famotidine (Pepcid) 20 mg at 7 pm to help with reflux at night. This medication can be purchased over the counter. If it doesn't help after a few weeks, you can stop taking it.   Increase carbidopa-levodopa to 1.5 tablets at 2 pm to help with afternoon/evening tremors.   If you forget a dose of carbidopa-levodopa, take it when you remember even if it is at the same time as your tube feeding. This won't cause any adverse effects but that dose of carbidopa-levodopa may be a bit less effective.     Flomax - getting up every 1-1.5 hours. 3-4 days.     Brush without toothpaste.   Seeing dentist 3 times/year.     Today's Vitals: There were no vitals taken for this visit.  ----------------      I spent *** minutes with this patient today. All changes were made via collaborative practice agreement with ***. A copy of the visit note was provided to the patient's provider(s).    A summary of these recommendations {GIVEN/NOT GIVEN:141272}.    Leslie Clifford, Pharm.D.  Medication Therapy Management Pharmacist  [facility]    Telemedicine Visit Details  Type of service:  Video Conference via [employer]  Start Time:  11:30 AM  End Time: 11:47 AM     Medication Therapy Recommendations  No medication therapy recommendations to display       Again, thank you for allowing me to participate in the care of your patient.      Sincerely,    Leslie Clifford [facility]

## 2024-07-23 NOTE — PROGRESS NOTES
Medication Therapy Management (MTM) Encounter    ASSESSMENT:                            Medication Adherence/Access: No issues identified    Parkinson's Disease:    Stable     GERD/dysphagia:   Stable     BPH:  Patient encouraged to try the tamsulosin for a trial of 2 weeks before stopping it. If this does not help, he should follow up with his provider.    Vaccines:  Reviewed patient's vaccinations and it appears he is due for Tdap and second dose of Hep A. He also only received one dose of Zoster vaccine previously and would benefit from getting the full Shingrix vaccine series as it has been shown to be more effective than Zostavax. I am not sure that the IPV (polio) vaccine (3rd dose) is necessary so I will discuss with his PCP/BMT team.     PLAN:                            I would recommend re-trying tamsulosin (Flomax) for 2 weeks then stop if not helping with urinary symptoms and follow up with your provider.  I reviewed your vaccinations:  I would recommend getting the Tdap (tetanus) and the first Shingrix (shingles) vaccine (2 parts, 2-6 months apart) now and these can both be done at the pharmacy.   You are also due for the 2nd part of the Hepatitis A vaccine, which may not be available at the pharmacy so you may need to get this done at your clinic.    I am verifying with Dr. Nowak and Dr. Miller on whether you need to receive the 3rd dose of IPV (polio).    Follow-up: 1/21/25 at 11:30 am video visit     SUBJECTIVE/OBJECTIVE:                          Haresh Rock is a 74 year old male seen for a follow-up visit.       Reason for visit: follow up on medications.    Allergies/ADRs: Reviewed in chart  Past Medical History: Reviewed in chart  Tobacco: He reports that he has never smoked. He has never used smokeless tobacco.  Alcohol: not currently using    Medication Adherence/Access:   8-9 am- swallow morning pills by mouth- flecainide, carbidopa-levodopa, omeprazole, metoprolol   9-11 am- morning  feeding for 45 minutes (at least 1 hour after morning medication)  12 pm- nap (at least 1 hour after feeding is completed) & loratadine ODT (by mouth)  2 pm- carbidopa-levodopa crushed via G-tube  3-4 pm- afternoon feeding for 45 minutes  7-7:30 pm- evening pills crushed via G-tube- warfarin, flecainide, rosuvastatin, carbidopa-levodopa   9 pm- evening feeding for 45 minutes     Parkinson's Disease:    - carbidopa-levodopa  mg, 1 tablet at 8-9 am, 1.5 tablets at 2 pm, 1 tablet at 7-7:30 pm  Increased dose of carbidopa-levodopa at 2 pm has helped a little bit with his tremor. Reports his tremor is pretty minimal at this time. Occasionally misses a dose or takes it late but doesn't notice much difference in his symptoms.      GERD/dysphagia:   - omeprazole 20 mg every morning  - - stopped.   Patient states he tried the famotidine but it didn't improve his reflux so he has stopped it. He reports that he is not getting acid reflux but gets some reflux of food. He is getting his tube switched this week. Typically tube is replaced every 5-6 months.   Patient states he has been brushing his teeth without toothpaste because the toothpaste adheres to his mouth. He is rinsing his mouth regularly. He is seeing his dentist 3 times/year and has not had any issues with cavities.     BPH:  -  - stopped  Patient states he tried the tamsulosin for 3-4 days and didn't feel it was helping so he stopped taking it. He will get up every 1-1.5 hours at night to urinate. Does not have trouble emptying his bladder.     Vaccines:  Patient noted several vaccinations due on his MyChart and is wondering which he should have completed     Today's Vitals: There were no vitals taken for this visit.  ----------------      I spent 17 minutes with this patient today. All changes were made via collaborative practice agreement with Dr. Hill. A copy of the visit note was provided to the patient's provider(s).    A summary of these recommendations  was sent via [employer].    Leslie Clifford, Pharm.D.  Medication Therapy Management Pharmacist  [facility]    Telemedicine Visit Details  Type of service:  Video Conference via [employer]  Start Time:  11:30 AM  End Time: 11:47 AM     Medication Therapy Recommendations  Benign prostatic hyperplasia, unspecified whether lower urinary tract symptoms present    Current Medication: tamsulosin (FLOMAX) 0.4 MG capsule   Rationale: Does not understand instructions - Adherence - Adherence   Recommendation: Provide Education   Status: Patient Agreed - Adherence/Education         Vaccine counseling    Rationale: Preventive therapy - Needs additional medication therapy - Indication   Recommendation: Start Medication - Tdap (tetanus-diphtheria-acell pertussis) 5-2-15.5 LF-MCG/0.5 injection   Status: Accepted - no CPA Needed

## 2024-07-24 ENCOUNTER — OFFICE VISIT (OUTPATIENT)
Dept: SURGERY | Facility: CLINIC | Age: 74
End: 2024-07-24
Payer: COMMERCIAL

## 2024-07-24 VITALS
WEIGHT: 178.8 LBS | OXYGEN SATURATION: 98 % | BODY MASS INDEX: 25.03 KG/M2 | SYSTOLIC BLOOD PRESSURE: 124 MMHG | DIASTOLIC BLOOD PRESSURE: 70 MMHG | HEIGHT: 71 IN | HEART RATE: 65 BPM

## 2024-07-24 DIAGNOSIS — Z43.1 ATTENTION TO G-TUBE (H): Primary | ICD-10-CM

## 2024-07-24 PROCEDURE — 99212 OFFICE O/P EST SF 10 MIN: CPT | Performed by: SURGERY

## 2024-07-24 RX ORDER — CARBIDOPA AND LEVODOPA 25; 100 MG/1; MG/1
TABLET ORAL
Qty: 360 TABLET | Refills: 3 | Status: SHIPPED | OUTPATIENT
Start: 2024-07-24

## 2024-07-24 ASSESSMENT — PAIN SCALES - GENERAL: PAINLEVEL: NO PAIN (0)

## 2024-07-24 NOTE — PATIENT INSTRUCTIONS
You met with Dr. Tyler Philippe.      Today's visit instructions:    Return to the Surgery Clinic on an as needed basis.        If you have questions please contact Annabelle RN or Keya RN during regular clinic hours, Monday through Friday 7:30 AM - 4:00 PM, or you can contact us via [employer] at anytime.       If you have urgent needs after-hours, weekends, or holidays please call the hospital at [phone] and ask to speak with our on-call General Surgery Team.    Appointment [phone]  Nurse Advice (Annabelle or Keya): [phone]   Surgery Scheduler (Amber): [phone]  Fax: [phone]

## 2024-07-24 NOTE — PROGRESS NOTES
"I saw Haresh Rock today for replacement of his 20F low profile Jerrell-gtube.    He has no current issues with the tube. He has noted some bleeding near it today.    No other concerns.    PE:  /70 (BP Location: Left arm, Patient Position: Sitting, Cuff Size: Adult Regular)   Pulse 65   Ht 1.803 m (5' 11\")   Wt 81.1 kg (178 lb 12.8 oz)   SpO2 98%   BMI 24.94 kg/m    Alert, interactive, pleasant  No resp distress  Gtube in place with a ridge of hypertrophic granulation tissue.  I applied silver nitrate to this.    I removed the water from the balloon.  Gtube replaced with identical 2cm 20F low profile JERRELL Gtube.  Balloon inflated with 4ml of water.    A/P  Gtube exchange without issue  Follow-up as needed    Total time 10 minutes (exam, Gtube exchange, documentation)  "

## 2024-07-24 NOTE — PATIENT INSTRUCTIONS
"Recommendations from today's MTM visit:                                                    I would recommend re-trying tamsulosin (Flomax) for 2 weeks then stop if not helping with urinary symptoms and follow up with your provider.  I reviewed your vaccinations:  I would recommend getting the Tdap (tetanus) and the first Shingrix (shingles) vaccine (2 parts, 2-6 months apart) now and these can both be done at the pharmacy.   You are also due for the 2nd part of the Hepatitis A vaccine, which may not be available at the pharmacy so you may need to get this done at your clinic.    I am verifying with Dr. Nowak and Dr. Miller on whether you need to receive the 3rd dose of IPV (polio).    Follow-up: 1/21/25 at 11:30 am video visit     It was great speaking with you today.  I value your experience and would be very thankful for your time in providing feedback in our clinic survey. In the next few days, you may receive an email or text message from [employer] with a link to a survey related to your  clinical pharmacist.\"     To schedule another MTM appointment, please call the clinic directly or you may call the MTM scheduling line at [phone].    My Clinical Pharmacist's contact information:                                                      Please feel free to contact me with any questions or concerns you have.      Leslie Clifford, Pharm.D.  Medication Therapy Management Pharmacist  [facility]     "

## 2024-07-24 NOTE — NURSING NOTE
"Chief Complaint   Patient presents with    RECHECK     Change WILTON, tube in cubby       Vitals:    07/24/24 1108   BP: 124/70   BP Location: Left arm   Patient Position: Sitting   Cuff Size: Adult Regular   Pulse: 65   SpO2: 98%   Weight: 81.1 kg (178 lb 12.8 oz)   Height: 1.803 m (5' 11\")       Body mass index is 24.94 kg/m .                          Devendra Zepeda, EMT    "

## 2024-07-29 ENCOUNTER — DOCUMENTATION ONLY (OUTPATIENT)
Dept: ANTICOAGULATION | Facility: CLINIC | Age: 74
End: 2024-07-29
Payer: COMMERCIAL

## 2024-07-29 DIAGNOSIS — Z79.01 LONG TERM CURRENT USE OF ANTICOAGULANT THERAPY: Primary | ICD-10-CM

## 2024-07-29 DIAGNOSIS — I48.0 PAROXYSMAL ATRIAL FIBRILLATION (H): ICD-10-CM

## 2024-07-29 DIAGNOSIS — Z86.711 PERSONAL HISTORY OF PE (PULMONARY EMBOLISM): ICD-10-CM

## 2024-07-29 LAB — INR HOME MONITORING: 2.3 RATIO (ref 2–3)

## 2024-07-29 NOTE — PROGRESS NOTES
ANTICOAGULATION  MANAGEMENT-Home Monitor Managed by Exception    Haresh EMILIE Rock 74 year old male is on warfarin with therapeutic INR result. (Goal INR 2.0-3.0)    Recent labs: (last 7 days)     07/29/24  1251   INR 2.3       Previous INR was Therapeutic  Medication, diet, health changes since last INR:chart reviewed; none identified  Contacted within the last 12 weeks by phone on 7/22/24   Due for next call no later than: 10/20/24.   Last [facility] referral date: 05/24/2024      MINNIE Hutson was NOT contacted regarding therapeutic result today per home monitoring policy manage by exception agreement.   Current warfarin dose is to be continued:     Summary  As of 7/29/2024      Full warfarin instructions:  3.75 mg every Sun, Tue, Fri; 2.5 mg all other days   Next INR check:  8/5/2024             ?   Allyn Doe RN  Anticoagulation Clinic  7/29/2024    _______________________________________________________________________     Anticoagulation Episode Summary       Current INR goal:  2.0-3.0   TTR:  84.0% (1 y)   Target end date:  Indefinite   Send INR reminders to:  [facility]    Indications    Long-term (current) use of anticoagulants [Z79.01] [Z79.01]  Atrial fibrillation (H) (Resolved) [I48.91]  Antiphospholipid antibody syndrome (H24) (Resolved) [D68.61]  Personal history of DVT (deep vein thrombosis) (Resolved) [Z86.718]  Atrial fibrillation  unspecified type (H) (Resolved) [I48.91]  Paroxysmal atrial fibrillation (H) [I48.0]  Chronic atrial fibrillation (H) (Resolved) [I48.20]  Personal history of PE (pulmonary embolism) [Z86.711]             Comments:  JESSICA rodas to manage by exception             Anticoagulation Care Providers       Provider Role Specialty Phone number    Anya Nowak MD Referring Family Medicine [phone]    Elizabeth Balderas MD Referring HOSPITALIST [phone]

## 2024-07-30 DIAGNOSIS — K92.0 HEMATEMESIS WITH NAUSEA: ICD-10-CM

## 2024-08-02 ENCOUNTER — DOCUMENTATION ONLY (OUTPATIENT)
Dept: ANTICOAGULATION | Facility: CLINIC | Age: 74
End: 2024-08-02
Payer: COMMERCIAL

## 2024-08-02 DIAGNOSIS — I48.0 PAROXYSMAL ATRIAL FIBRILLATION (H): ICD-10-CM

## 2024-08-02 DIAGNOSIS — Z79.01 LONG TERM CURRENT USE OF ANTICOAGULANT THERAPY: Primary | ICD-10-CM

## 2024-08-02 DIAGNOSIS — Z86.711 PERSONAL HISTORY OF PE (PULMONARY EMBOLISM): ICD-10-CM

## 2024-08-02 LAB — INR HOME MONITORING: 2.4 RATIO (ref 2–3)

## 2024-08-02 NOTE — PROGRESS NOTES
ANTICOAGULATION  MANAGEMENT-Home Monitor Managed by Exception    Haresh EMILIE Rock 74 year old male is on warfarin with therapeutic INR result. (Goal INR 2.0-3.0)    Recent labs: (last 7 days)     08/02/24  1241   INR 2.4       Previous INR was Therapeutic  Medication, diet, health changes since last INR:chart reviewed; none identified  Contacted within the last 12 weeks by phone on 7/22/24  Last [facility] referral date: 05/24/2024      MINNIE Hutson was NOT contacted regarding therapeutic result today per home monitoring policy manage by exception agreement.   Current warfarin dose is to be continued:     Summary  As of 8/2/2024      Full warfarin instructions:  3.75 mg every Sun, Tue, Fri; 2.5 mg all other days   Next INR check:  8/9/2024             ?   Shanice Hyde RN  Anticoagulation Clinic  8/2/2024    _______________________________________________________________________     Anticoagulation Episode Summary       Current INR goal:  2.0-3.0   TTR:  84.0% (1 y)   Target end date:  Indefinite   Send INR reminders to:  [facility]    Indications    Long-term (current) use of anticoagulants [Z79.01] [Z79.01]  Atrial fibrillation (H) (Resolved) [I48.91]  Antiphospholipid antibody syndrome (H24) (Resolved) [D68.61]  Personal history of DVT (deep vein thrombosis) (Resolved) [Z86.718]  Atrial fibrillation  unspecified type (H) (Resolved) [I48.91]  Paroxysmal atrial fibrillation (H) [I48.0]  Chronic atrial fibrillation (H) (Resolved) [I48.20]  Personal history of PE (pulmonary embolism) [Z86.711]             Comments:  JESSICA rodas to manage by exception             Anticoagulation Care Providers       Provider Role Specialty Phone number    Anya Nowak MD Referring Family Medicine [phone]    Elizabeth Balderas MD Referring HOSPITALIST [phone]

## 2024-08-05 NOTE — PROGRESS NOTES
Sports Medicine Clinic           ASSESSMENT and PLAN:     Haresh was seen today for pain.    Diagnoses and all orders for this visit:    Right hip pain  Acute right-sided low back pain without sciatica  Fall, initial encounter  Altered gait  Parkinson's disease, unspecified whether dyskinesia present, unspecified whether manifestations fluctuate (H)  Fall several days ago with pain of the right hip and right lower back. Xrays without fracture and with OA of the hips bilaterally. His symptoms are most consistent with lumbar back strain however he is generally sore and uncomfortable from his fall. We discussed symptom management with tylenol and heat as needed, but that the most important thing is to prevent falls which he is at high risk for with his Parkinson's and altered gait. This fall occurred because he tripped over his foot while going up the stairs. Working on his balance, coordination and overall strength is critical and he is open to going back to PT to work on these things.   -     Physical Therapy  Referral; Future    Return sooner if develops new or worsening symptoms.    Options for treatment and/or follow-up care were reviewed with the patient was actively involved in the decision making process. Patient verbalized understanding and was in agreement with the plan.      Lilibeth Murray MD, CAM  Primary Care Sports Medicine           SUBJECTIVE       Haresh Rock is a 74 year old male presenting to clinic today with a chief complaint of right hip pain, referred by self.    Onset: 3-4 day(s) ago. Patient describes injury as taking the stairs and took a fall   Location of Pain: right posterior hip   Rating of Pain at worst: 10/10  Rating of Pain Currently: 6/10  Worsened by: Sleeping on that side, standing, twisting, sitting to standing   Better with: NA   Treatments tried: no treatment tried to date  Associated symptoms: no distal numbness or tingling; denies swelling or warmth  Orthopedic  history: NO  Relevant surgical history: Yes- Bilateral knee replacement   Social history: social history: retired    Fell forward when he was going up the stairs. Landed on his hands and then twisted to his right, doesn't think he fell on his hip. didn't fall on his hip, but has been having pain in his posterior hip since. He has some pain there previously, but it has been worse since his fall. He has to get up 3-4 times to get up to go to the bathroom. He has pain when he goes to get up, not when he is lying on it.     PMH, Medications and Allergies were reviewed and updated as needed.    ROS:  As noted above otherwise negative.    Patient Active Problem List   Diagnosis    [id number] disease, chronic (H)    Polyneuropathy    History of bilateral knee replacement    Bone marrow transplant status (H)    Hyperlipidemia LDL goal <70    Chronic rhinitis    Gallstones without obstruction of gallbladder    Long-term (current) use of anticoagulants [Z79.01]    Type 2 diabetes mellitus with stage 2 chronic kidney disease, without long-term current use of insulin (H)    History of Hodgkin's lymphoma    History of irradiation, presenting hazards to health    Hereditary hemochromatosis (H24)    Oropharyngeal dysphagia    Personal history of PE (pulmonary embolism)    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 May take additional half tablet as needed. Max of 4 tablets/day. 360 tablet 3    flecainide  (TAMBOCOR) 50 MG tablet TAKE 1 TABLET TWICE A  tablet 2    loratadine (CLARITIN REDITABS) 10 MG ODT Take 10 mg by mouth daily      metoprolol tartrate (LOPRESSOR) 25 MG tablet TAKE ONE-HALF (1/2) TABLET (12.5 MG) ORALLY OR BY FEEDING TUBE TWICE A DAY (CRUSH TABLET) 90 tablet 0    NONFORMULARY Abbott osmolyte feedings 2 cans 3/day      omeprazole (PRILOSEC) 20 MG DR capsule TAKE 1 CAPSULE EVERY MORNING 90 capsule 3    PREVIDENT 5000 DRY MOUTH 1.1 % GEL topical gel Apply to affected area daily as needed Or brushes with water  3    rosuvastatin (CRESTOR) 40 MG tablet TAKE 1 TABLET AT BEDTIME 90 tablet 3    tamsulosin (FLOMAX) 0.4 MG capsule Take 1 capsule (0.4 mg) by mouth daily 30 capsule 3    warfarin ANTICOAGULANT (COUMADIN) 2.5 MG tablet Take 2.5 mg Tues, Thurs, Sat and 3.75 mg all other days, or as directed by the Coumadin Clinic 110 tablet 1            OBJECTIVE:       Vitals: There were no vitals filed for this visit.  BMI: There is no height or weight on file to calculate BMI.    Gen:  Well nourished and in no acute distress  HEENT: Extraocular movement intact  Neck: Supple  Pulm:  Breathing Comfortably. No increased respiratory effort.  Psych: Euthymic. Appropriately answers questions    MSK:   RIGHT HIP  Inspection:    No swelling, bruising, discoloration, or obvious deformity or asymmetry  Palpation:    Tender about the greater trochanteric region and right sided QL and paraspinals, no midline tenderness. Otherwise all Active Range of Motion:     Flexion limited by tightness, extension limited by tightness / IR limited by tightness / ER  limited by tightness  Strength:    Flexion 5-/5 / extension 5-/5 / adduction 4/5 / abduction 5-/5  Special Tests:    Negative: Logroll, SHARLA, anterior impingement (FADIR), slump test, SLR    Imaging was personally reviewed and interpreted by me.     Exam: Single frontal view of both hips and a frog-leg lateral view of  the right hip dated 8/6/2024.     COMPARISON: CT  dated 10/29/2021.     CLINICAL HISTORY: Right hip pain.     FINDINGS: Single frontal view of both hips and a frog-leg lateral view  of the right hip was obtained. Moderate osteoarthrosis of both hip  joints with joint space narrowing and osteophytic spurring at the  acetabulum of both hips. Fullness at the femoral head and neck  junction. Disc space narrowing at the lumbosacral junction. Sacroiliac  joints are patent. Enthesophyte at the left hip lesser trochanter.                                                                      IMPRESSION: At least moderate osteoarthrosis in both hip joints.

## 2024-08-06 ENCOUNTER — OFFICE VISIT (OUTPATIENT)
Dept: ORTHOPEDICS | Facility: CLINIC | Age: 74
End: 2024-08-06
Payer: COMMERCIAL

## 2024-08-06 ENCOUNTER — ANCILLARY PROCEDURE (OUTPATIENT)
Dept: GENERAL RADIOLOGY | Facility: CLINIC | Age: 74
End: 2024-08-06
Attending: STUDENT IN AN ORGANIZED HEALTH CARE EDUCATION/TRAINING PROGRAM
Payer: COMMERCIAL

## 2024-08-06 DIAGNOSIS — M54.50 ACUTE RIGHT-SIDED LOW BACK PAIN WITHOUT SCIATICA: ICD-10-CM

## 2024-08-06 DIAGNOSIS — R26.9 ALTERED GAIT: ICD-10-CM
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
## 2024-08-06 DIAGNOSIS — G20.A1 PARKINSON'S DISEASE, UNSPECIFIED WHETHER DYSKINESIA PRESENT, UNSPECIFIED WHETHER MANIFESTATIONS FLUCTUATE (H): ICD-10-CM

## 2024-08-06 DIAGNOSIS — M25.551 RIGHT HIP PAIN: Primary | ICD-10-CM

## 2024-08-06 PROCEDURE — 73502 X-RAY EXAM HIP UNI 2-3 VIEWS: CPT | Mod: RT | Performed by: RADIOLOGY

## 2024-08-06 PROCEDURE — 99203 OFFICE O/P NEW LOW 30 MIN: CPT | Performed by: STUDENT IN AN ORGANIZED HEALTH CARE EDUCATION/TRAINING PROGRAM

## 2024-08-06 NOTE — LETTER
8/6/2024      RE: Haresh Rock  [address]  [facility] [address]     Dear Colleague,    Thank you for referring your patient, Haresh Rock, to the [facility] [place]. Please see a copy of my visit note below.    Sports Medicine Clinic           ASSESSMENT and PLAN:     Haresh was seen today for pain.    Diagnoses and all orders for this visit:    Right hip pain  Acute right-sided low back pain without sciatica  Fall, initial encounter  Altered gait  Parkinson's disease, unspecified whether dyskinesia present, unspecified whether manifestations fluctuate (H)  Fall several days ago with pain of the right hip and right lower back. Xrays without fracture and with OA of the hips bilaterally. His symptoms are most consistent with lumbar back strain however he is generally sore and uncomfortable from his fall. We discussed symptom management with tylenol and heat as needed, but that the most important thing is to prevent falls which he is at high risk for with his Parkinson's and altered gait. This fall occurred because he tripped over his foot while going up the stairs. Working on his balance, coordination and overall strength is critical and he is open to going back to PT to work on these things.   -     Physical Therapy  Referral; Future    Return sooner if develops new or worsening symptoms.    Options for treatment and/or follow-up care were reviewed with the patient was actively involved in the decision making process. Patient verbalized understanding and was in agreement with the plan.      Lilibeth Murray MD, [facility]  Primary Care Sports Medicine           SUBJECTIVE       Haresh Rock is a 74 year old male presenting to clinic today with a chief complaint of right hip pain, referred by self.    Onset: 3-4 day(s) ago. Patient describes injury as taking the stairs and took a fall   Location of Pain: right posterior hip   Rating of Pain at worst: 10/10  Rating of Pain  Currently: 6/10  Worsened by: Sleeping on that side, standing, twisting, sitting to standing   Better with: NA   Treatments tried: no treatment tried to date  Associated symptoms: no distal numbness or tingling; denies swelling or warmth  Orthopedic history: NO  Relevant surgical history: Yes- Bilateral knee replacement   Social history: social history: retired    Fell forward when he was going up the stairs. Landed on his hands and then twisted to his right, doesn't think he fell on his hip. didn't fall on his hip, but has been having pain in his posterior hip since. He has some pain there previously, but it has been worse since his fall. He has to get up 3-4 times to get up to go to the bathroom. He has pain when he goes to get up, not when he is lying on it.     PMH, Medications and Allergies were reviewed and updated as needed.    ROS:  As noted above otherwise negative.    Patient Active Problem List   Diagnosis     Kffdg-gkxqjd-risk disease, chronic (H)     Polyneuropathy     History of bilateral knee replacement     Bone marrow transplant status (H)     Hyperlipidemia LDL goal <70     Chronic rhinitis     Gallstones without obstruction of gallbladder     Long-term (current) use of anticoagulants [Z79.01]     Type 2 diabetes mellitus with stage 2 chronic kidney disease, without long-term current use of insulin (H)     History of Hodgkin's lymphoma     History of irradiation, presenting hazards to health     Hereditary hemochromatosis (H24)     Oropharyngeal dysphagia     Personal history of PE (pulmonary embolism)     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 May take additional half tablet as needed. Max of 4 tablets/day. 360 tablet 3     flecainide (TAMBOCOR) 50 MG tablet TAKE 1 TABLET TWICE A  tablet 2     loratadine (CLARITIN REDITABS) 10 MG ODT Take 10 mg by mouth daily       metoprolol tartrate (LOPRESSOR) 25 MG tablet TAKE ONE-HALF (1/2) TABLET (12.5 MG) ORALLY OR BY FEEDING TUBE TWICE A DAY (CRUSH TABLET) 90 tablet 0     NONFORMULARY Abbott osmolyte feedings 2 cans 3/day       omeprazole (PRILOSEC) 20 MG DR capsule TAKE 1 CAPSULE EVERY MORNING 90 capsule 3     PREVIDENT 5000 DRY MOUTH 1.1 % GEL topical gel Apply to affected area daily as needed Or brushes with water  3     rosuvastatin (CRESTOR) 40 MG tablet TAKE 1 TABLET AT BEDTIME 90 tablet 3     tamsulosin (FLOMAX) 0.4 MG capsule Take 1 capsule (0.4 mg) by mouth daily 30 capsule 3     warfarin ANTICOAGULANT (COUMADIN) 2.5 MG tablet Take 2.5 mg Tues, Thurs, Sat and 3.75 mg all other days, or as directed by the Coumadin Clinic 110 tablet 1            OBJECTIVE:       Vitals: There were no vitals filed for this visit.  BMI: There is no height or weight on file to calculate BMI.    Gen:  Well nourished and in no acute distress  HEENT: Extraocular movement intact  Neck: Supple  Pulm:  Breathing Comfortably. No increased respiratory effort.  Psych: Euthymic. Appropriately answers questions    MSK:   RIGHT HIP  Inspection:    No swelling, bruising, discoloration, or obvious deformity or asymmetry  Palpation:    Tender about the greater trochanteric region and right sided QL and paraspinals, no midline tenderness. Otherwise all Active Range of Motion:     Flexion limited by tightness, extension limited by tightness / IR limited by tightness / ER  limited by tightness  Strength:    Flexion 5-/5 / extension 5-/5 / adduction 4/5 / abduction  5-/5  Special Tests:    Negative: Logroll, SHARLA, anterior impingement (FADIR), slump test, SLR    Imaging was personally reviewed and interpreted by me.     Exam: Single frontal view of both hips and a frog-leg lateral view of  the right hip dated 8/6/2024.     COMPARISON: CT dated 10/29/2021.     CLINICAL HISTORY: Right hip pain.     FINDINGS: Single frontal view of both hips and a frog-leg lateral view  of the right hip was obtained. Moderate osteoarthrosis of both hip  joints with joint space narrowing and osteophytic spurring at the  acetabulum of both hips. Fullness at the femoral head and neck  junction. Disc space narrowing at the lumbosacral junction. Sacroiliac  joints are patent. Enthesophyte at the left hip lesser trochanter.                                                                      IMPRESSION: At least moderate osteoarthrosis in both hip joints.      Again, thank you for allowing me to participate in the care of your patient.      Sincerely,    Lilibeth Murray MD

## 2024-08-09 ENCOUNTER — ANTICOAGULATION THERAPY VISIT (OUTPATIENT)
Dept: ANTICOAGULATION | Facility: CLINIC | Age: 74
End: 2024-08-09
Payer: COMMERCIAL

## 2024-08-09 DIAGNOSIS — I48.0 PAROXYSMAL ATRIAL FIBRILLATION (H): ICD-10-CM

## 2024-08-09 DIAGNOSIS — Z86.711 PERSONAL HISTORY OF PE (PULMONARY EMBOLISM): ICD-10-CM

## 2024-08-09 DIAGNOSIS — Z79.01 LONG TERM CURRENT USE OF ANTICOAGULANT THERAPY: Primary | ICD-10-CM

## 2024-08-09 LAB — INR HOME MONITORING: 2.6 RATIO (ref 2–3)

## 2024-08-09 NOTE — PROGRESS NOTES
ANTICOAGULATION MANAGEMENT     Haresh Rock 74 year old male is on warfarin with therapeutic INR result. (Goal INR 2.0-3.0)    Recent labs: (last 7 days)     08/09/24  1634   INR 2.6       ASSESSMENT     Source(s): Chart Review  Previous INR was Therapeutic last 2(+) visits  Medication, diet, health changes since last INR;per chart, patient was seen in office on 8/6/24 regarding hip pain that was a result of a fall. Per chart notes, patient had fallen 3-4 days prior. Notes do not mention patient hitting his head. Writer asked that patient call back with any concerns of unusual bruising, bleeding, or any additional falls.          PLAN     Recommended plan for no diet, medication or health factor changes affecting INR     Dosing Instructions: Continue your current warfarin dose with next INR in 1 week       Summary  As of 8/9/2024      Full warfarin instructions:  3.75 mg every Sun, Tue, Fri; 2.5 mg all other days   Next INR check:  8/16/2024               Detailed voice message left for Haresh with dosing instructions and follow up date.     Patient to recheck with home meter    Education provided: Please call back if any changes to your diet, medications or how you've been taking warfarin  Symptom monitoring: monitoring for bleeding signs and symptoms, monitoring for clotting signs and symptoms, monitoring for stroke signs and symptoms, when to seek medical attention/emergency care, and if you hit your head or have a bad fall seek emergency care  Contact [phone] with any changes, questions or concerns.     Plan made per [facility] anticoagulation protocol    Shanice Hyde RN  Anticoagulation Clinic  8/9/2024    _______________________________________________________________________     Anticoagulation Episode Summary       Current INR goal:  2.0-3.0   TTR:  84.0% (1 y)   Target end date:  Indefinite   Send INR reminders to:  [facility]    Indications    Long-term (current) use of anticoagulants [Z79.01]  [Z79.01]  Atrial fibrillation (H) (Resolved) [I48.91]  Antiphospholipid antibody syndrome (H24) (Resolved) [D68.61]  Personal history of DVT (deep vein thrombosis) (Resolved) [Z86.718]  Atrial fibrillation  unspecified type (H) (Resolved) [I48.91]  Paroxysmal atrial fibrillation (H) [I48.0]  Chronic atrial fibrillation (H) (Resolved) [I48.20]  Personal history of PE (pulmonary embolism) [Z86.711]             Comments:  JESSICA rodas to manage by exception             Anticoagulation Care Providers       Provider Role Specialty Phone number    Anya Nowak MD Referring Family Medicine [phone]    Elizabeth Balderas MD Referring HOSPITALIST [phone]

## 2024-08-16 ENCOUNTER — DOCUMENTATION ONLY (OUTPATIENT)
Dept: ANTICOAGULATION | Facility: CLINIC | Age: 74
End: 2024-08-16
Payer: COMMERCIAL

## 2024-08-16 DIAGNOSIS — Z86.711 PERSONAL HISTORY OF PE (PULMONARY EMBOLISM): ICD-10-CM

## 2024-08-16 DIAGNOSIS — Z79.01 LONG TERM CURRENT USE OF ANTICOAGULANT THERAPY: Primary | ICD-10-CM

## 2024-08-16 DIAGNOSIS — I48.0 PAROXYSMAL ATRIAL FIBRILLATION (H): ICD-10-CM

## 2024-08-16 LAB — INR HOME MONITORING: 2.3 RATIO (ref 2–3)

## 2024-08-16 NOTE — PROGRESS NOTES
ANTICOAGULATION  MANAGEMENT-Home Monitor Managed by Exception    Haresh EMILIE Rock 74 year old male is on warfarin with therapeutic INR result. (Goal INR 2.0-3.0)    Recent labs: (last 7 days)     08/16/24  1244   INR 2.3       Previous INR was Therapeutic  Medication, diet, health changes since last INR:chart reviewed; none identified  Contacted within the last 12 weeks by phone on 8/9/24  Last [facility] referral date: 05/24/2024      MINNIE Hutson was NOT contacted regarding therapeutic result today per home monitoring policy manage by exception agreement.   Current warfarin dose is to be continued:     Summary  As of 8/16/2024      Full warfarin instructions:  3.75 mg every Sun, Tue, Fri; 2.5 mg all other days   Next INR check:  8/23/2024             ?   Nieves Sheth RN  Anticoagulation Clinic  8/16/2024    _______________________________________________________________________     Anticoagulation Episode Summary       Current INR goal:  2.0-3.0   TTR:  84.0% (1 y)   Target end date:  Indefinite   Send INR reminders to:  [facility]    Indications    Long-term (current) use of anticoagulants [Z79.01] [Z79.01]  Atrial fibrillation (H) (Resolved) [I48.91]  Antiphospholipid antibody syndrome (H24) (Resolved) [D68.61]  Personal history of DVT (deep vein thrombosis) (Resolved) [Z86.718]  Atrial fibrillation  unspecified type (H) (Resolved) [I48.91]  Paroxysmal atrial fibrillation (H) [I48.0]  Chronic atrial fibrillation (H) (Resolved) [I48.20]  Personal history of PE (pulmonary embolism) [Z86.711]             Comments:  JESSICA rodas to manage by exception             Anticoagulation Care Providers       Provider Role Specialty Phone number    Anya Nowak MD Referring Family Medicine [phone]    Elizabeth Balderas MD Referring HOSPITALIST [phone]

## 2024-08-23 LAB — INR HOME MONITORING: 2.4 RATIO (ref 2–3)

## 2024-08-26 ENCOUNTER — DOCUMENTATION ONLY (OUTPATIENT)
Dept: ANTICOAGULATION | Facility: CLINIC | Age: 74
End: 2024-08-26
Payer: COMMERCIAL

## 2024-08-26 DIAGNOSIS — I48.0 PAROXYSMAL ATRIAL FIBRILLATION (H): ICD-10-CM

## 2024-08-26 DIAGNOSIS — Z79.01 LONG TERM CURRENT USE OF ANTICOAGULANT THERAPY: Primary | ICD-10-CM

## 2024-08-26 DIAGNOSIS — Z86.711 PERSONAL HISTORY OF PE (PULMONARY EMBOLISM): ICD-10-CM

## 2024-08-26 NOTE — PROGRESS NOTES
ANTICOAGULATION  MANAGEMENT-Home Monitor Managed by Exception    Haresh Rock 74 year old male is on warfarin with therapeutic INR result. (Goal INR 2.0-3.0)    Recent labs: (last 7 days)     08/23/24 2016   INR 2.4       Previous INR was Therapeutic  Medication, diet, health changes since last INR:chart reviewed; none identified  Contacted within the last 12 weeks by phone on 8/9/24  Last [facility] referral date: 05/24/2024      MINNIE Hutson was NOT contacted regarding therapeutic result today per home monitoring policy manage by exception agreement.   Current warfarin dose is to be continued:     Summary  As of 8/26/2024      Full warfarin instructions:  3.75 mg every Sun, Tue, Fri; 2.5 mg all other days   Next INR check:  8/30/2024             ?   Shanice Dhillon RN  Anticoagulation Clinic  8/26/2024    _______________________________________________________________________     Anticoagulation Episode Summary       Current INR goal:  2.0-3.0   TTR:  83.9% (1 y)   Target end date:  Indefinite   Send INR reminders to:  [facility]    Indications    Long-term (current) use of anticoagulants [Z79.01] [Z79.01]  Atrial fibrillation (H) (Resolved) [I48.91]  Antiphospholipid antibody syndrome (H24) (Resolved) [D68.61]  Personal history of DVT (deep vein thrombosis) (Resolved) [Z86.718]  Atrial fibrillation  unspecified type (H) (Resolved) [I48.91]  Paroxysmal atrial fibrillation (H) [I48.0]  Chronic atrial fibrillation (H) (Resolved) [I48.20]  Personal history of PE (pulmonary embolism) [Z86.711]             Comments:  JESSICA rodas to manage by exception             Anticoagulation Care Providers       Provider Role Specialty Phone number    Anya Nowak MD Referring Family Medicine [phone]    Elizabeth Balderas MD Referring HOSPITALIST [phone]

## 2024-08-30 ENCOUNTER — ANTICOAGULATION THERAPY VISIT (OUTPATIENT)
Dept: ANTICOAGULATION | Facility: CLINIC | Age: 74
End: 2024-08-30
Payer: COMMERCIAL

## 2024-08-30 DIAGNOSIS — Z86.711 PERSONAL HISTORY OF PE (PULMONARY EMBOLISM): ICD-10-CM

## 2024-08-30 DIAGNOSIS — Z79.01 LONG TERM CURRENT USE OF ANTICOAGULANT THERAPY: Primary | ICD-10-CM

## 2024-08-30 DIAGNOSIS — I48.0 PAROXYSMAL ATRIAL FIBRILLATION (H): ICD-10-CM

## 2024-08-30 LAB — INR HOME MONITORING: 1.9 RATIO (ref 2–3)

## 2024-08-30 NOTE — PROGRESS NOTES
ANTICOAGULATION MANAGEMENT     Haresh Rock 74 year old male is on warfarin with subtherapeutic INR result. (Goal INR 2.0-3.0)    Recent labs: (last 7 days)     08/30/24  1650   INR 1.9*       ASSESSMENT     Source(s): Chart Review and Patient/Caregiver Call     Warfarin doses taken: Warfarin taken as instructed  Diet: No new diet changes identified  Medication/supplement changes: None noted  New illness, injury, or hospitalization: No  Signs or symptoms of bleeding or clotting: No  Previous result: Therapeutic last 2(+) visits  Additional findings: None       PLAN     Recommended plan for no diet, medication or health factor changes affecting INR     Dosing Instructions: Continue your current warfarin dose with next INR in 1 week       Summary  As of 8/30/2024      Full warfarin instructions:  3.75 mg every Sun, Tue, Fri; 2.5 mg all other days   Next INR check:  9/6/2024               Telephone call with Haresh who verbalizes understanding and agrees to plan    Patient to recheck with home meter    Education provided: Please call back if any changes to your diet, medications or how you've been taking warfarin  Symptom monitoring: monitoring for bleeding signs and symptoms, monitoring for clotting signs and symptoms, and monitoring for stroke signs and symptoms    Plan made per [facility] anticoagulation protocol    Shanice Hyde RN  Anticoagulation Clinic  8/30/2024    _______________________________________________________________________     Anticoagulation Episode Summary       Current INR goal:  2.0-3.0   TTR:  83.6% (1 y)   Target end date:  Indefinite   Send INR reminders to:  [facility]    Indications    Long-term (current) use of anticoagulants [Z79.01] [Z79.01]  Atrial fibrillation (H) (Resolved) [I48.91]  Antiphospholipid antibody syndrome (H24) (Resolved) [D68.61]  Personal history of DVT (deep vein thrombosis) (Resolved) [Z86.718]  Atrial fibrillation  unspecified type (H) (Resolved)  [I48.91]  Paroxysmal atrial fibrillation (H) [I48.0]  Chronic atrial fibrillation (H) (Resolved) [I48.20]  Personal history of PE (pulmonary embolism) [Z86.711]             Comments:  JESSICA rodas to manage by exception             Anticoagulation Care Providers       Provider Role Specialty Phone number    Anya Nowak MD Referring Family Medicine [phone]    Elizabeth Balderas MD Referring HOSPITALIST [phone]

## 2024-09-05 ENCOUNTER — ENROLLMENT (OUTPATIENT)
Dept: HOME HEALTH SERVICES | Facility: HOME HEALTH | Age: 74
End: 2024-09-05
Payer: COMMERCIAL

## 2024-09-06 ENCOUNTER — MYC REFILL (OUTPATIENT)
Dept: CARDIOLOGY | Facility: CLINIC | Age: 74
End: 2024-09-06
Payer: COMMERCIAL

## 2024-09-06 ENCOUNTER — ANTICOAGULATION THERAPY VISIT (OUTPATIENT)
Dept: ANTICOAGULATION | Facility: CLINIC | Age: 74
End: 2024-09-06
Payer: COMMERCIAL

## 2024-09-06 DIAGNOSIS — I47.10 PAROXYSMAL SUPRAVENTRICULAR TACHYCARDIA (H): ICD-10-CM

## 2024-09-06 DIAGNOSIS — I48.0 PAROXYSMAL ATRIAL FIBRILLATION (H): ICD-10-CM

## 2024-09-06 DIAGNOSIS — Z79.01 LONG TERM CURRENT USE OF ANTICOAGULANT THERAPY: Primary | ICD-10-CM

## 2024-09-06 DIAGNOSIS — Z86.711 PERSONAL HISTORY OF PE (PULMONARY EMBOLISM): ICD-10-CM

## 2024-09-06 LAB — INR HOME MONITORING: 1.5 RATIO (ref 2–3)

## 2024-09-06 NOTE — PROGRESS NOTES
[employer] Message from Haresh:  All intakes have remained the same, but I have been having more frequent bowel movements, once or twice a day where normal was every couple of days. Also feel a little queasy and have more reflux resulting in drooling. Maybe a progression of Parkinson's.  I will take 3 tablets tonight.        Recommended plan for temporary change(s) affecting INR     Dosing Instructions: booster dose then continue your current warfarin dose (switching 3.75 mg days to Sun/Tue/Thur) with next INR in 1 week       Summary  As of 9/6/2024      Full warfarin instructions:  9/6: 7.5 mg; Otherwise 3.75 mg every Sun, Tue, Thu; 2.5 mg all other days   Next INR check:  9/13/2024               Sent [employer] message with dosing and follow up instructions    Patient to recheck with home meter    Education provided: Please call back if any changes to your diet, medications or how you've been taking warfarin  Goal range and lab monitoring: goal range and significance of current result and Importance of following up at instructed interval  Symptom monitoring: monitoring for bleeding signs and symptoms and monitoring for clotting signs and symptoms  Written instructions provided  Contact [phone] with any changes, questions or concerns.     Plan made per [facility] anticoagulation protocol    Mariela Berger RN  Anticoagulation Clinic  9/6/2024    _______________________________________________________________________     Anticoagulation Episode Summary       Current INR goal:  2.0-3.0   TTR:  81.7% (1 y)   Target end date:  Indefinite   Send INR reminders to:  [facility]    Indications    Long-term (current) use of anticoagulants [Z79.01] [Z79.01]  Atrial fibrillation (H) (Resolved) [I48.91]  Antiphospholipid antibody syndrome (H24) (Resolved) [D68.61]  Personal history of DVT (deep vein thrombosis) (Resolved) [Z86.718]  Atrial fibrillation  unspecified type (H) (Resolved) [I48.91]  Paroxysmal atrial fibrillation  (H) [I48.0]  Chronic atrial fibrillation (H) (Resolved) [I48.20]  Personal history of PE (pulmonary embolism) [Z86.711]             Comments:  JESSICA rodas to manage by exception             Anticoagulation Care Providers       Provider Role Specialty Phone number    Anya Nowak MD Referring Family Medicine [phone]    Elizabeth Balderas MD Referring HOSPITALIST [phone]

## 2024-09-06 NOTE — PROGRESS NOTES
ANTICOAGULATION MANAGEMENT     Haresh Rock 74 year old male is on warfarin with subtherapeutic INR result. (Goal INR 2.0-3.0)    Recent labs: (last 7 days)     09/06/24  1302   INR 1.5*       ASSESSMENT     Source(s): Chart Review  Previous INR was Subtherapeutic - 1.9  Medication, diet, health changes since last INR chart reviewed; none identified         PLAN     Unable to reach Haresh by phone today.    Left message to take a booster dose of warfarin,  7.5 mg tonight. Request call back for assessment.    Follow up required to confirm warfarin dose taken and assess for changes, discuss out of range result , and discuss dosing instructions and confirm understanding of instructions    Mariela Berger RN  Anticoagulation Clinic  9/6/2024

## 2024-09-10 NOTE — TELEPHONE ENCOUNTER
flecainide (TAMBOCOR) 50 MG tablet        Last Written Prescription Date:  1/10/24  Last Fill Quantity: 180,   # refills: 2  Last Office Visit : 10/30/23 [facility]  Future Office visit:  One year- not scheduled    Routing refill request to provider for review/approval because:   FLECAINIDE not on the [facility], [facility] or [facility] refill protocol

## 2024-09-11 RX ORDER — FLECAINIDE ACETATE 50 MG/1
50 TABLET ORAL 2 TIMES DAILY
Qty: 180 TABLET | Refills: 0 | Status: SHIPPED | OUTPATIENT
Start: 2024-09-11

## 2024-09-12 NOTE — TELEPHONE ENCOUNTER
Attempted to reach patient. Left a message for patient to call back to schedule next available Dr. Matt appointment with an Echocardiogram prior.    QASIM Araujo

## 2024-09-13 ENCOUNTER — ANTICOAGULATION THERAPY VISIT (OUTPATIENT)
Dept: ANTICOAGULATION | Facility: CLINIC | Age: 74
End: 2024-09-13
Payer: COMMERCIAL

## 2024-09-13 DIAGNOSIS — Z79.01 LONG TERM CURRENT USE OF ANTICOAGULANT THERAPY: Primary | ICD-10-CM

## 2024-09-13 DIAGNOSIS — I48.0 PAROXYSMAL ATRIAL FIBRILLATION (H): ICD-10-CM

## 2024-09-13 DIAGNOSIS — Z86.711 PERSONAL HISTORY OF PE (PULMONARY EMBOLISM): ICD-10-CM

## 2024-09-13 LAB — INR HOME MONITORING: 2.3 RATIO (ref 2–3)

## 2024-09-13 NOTE — PROGRESS NOTES
ANTICOAGULATION MANAGEMENT     Haresh Rock 74 year old male is on warfarin with therapeutic INR result. (Goal INR 2.0-3.0)    Recent labs: (last 7 days)     09/13/24  1225   INR 2.3       ASSESSMENT     Source(s): Chart Review  Previous INR was Subtherapeutic  Medication, diet, health changes since last INR chart reviewed; none identified, except booster dose advised last Friday 9/6/24       PLAN     Unable to reach Haresh today.    Left message to continue current 2.5 mg Fri/Sat & 3.75 mg Sun this weekend. Request call back for assessment.    Follow up required to confirm warfarin dose taken and assess for changes    Julia Tripp, RN  9/13/2024  Anticoagulation Clinic  [facility] for routing messages: [employer] [facility]  [facility] patient phone line: [phone]

## 2024-09-16 NOTE — PROGRESS NOTES
ANTICOAGULATION MANAGEMENT     Haresh Rock 74 year old male is on warfarin with therapeutic INR result. (Goal INR 2.0-3.0)    Recent labs: (last 7 days)     09/13/24  1225   INR 2.3       ASSESSMENT     Source(s): Chart Review and Patient/Caregiver Call     Warfarin doses taken: Warfarin taken as instructed  Diet: No new diet changes identified  Medication/supplement changes: None noted  New illness, injury, or hospitalization: No  Signs or symptoms of bleeding or clotting: No  Previous result: Subtherapeutic  Additional findings: None       PLAN     Recommended plan for no diet, medication or health factor changes affecting INR     Dosing Instructions: Continue your current warfarin dose with next INR in 1 week       Summary  As of 9/13/2024      Full warfarin instructions:  3.75 mg every Sun, Tue, Thu; 2.5 mg all other days   Next INR check:  9/20/2024               Telephone call with Haresh who agrees to plan and repeated back plan correctly    Patient to recheck with home meter    Education provided: Please call back if any changes to your diet, medications or how you've been taking warfarin  Goal range and lab monitoring: goal range and significance of current result  Resume manage by exception with home monitor. Continue to submit INR results to home monitor company.You will only be called when your result is out of range. Please call and notify [facility] if new medication started, dose missed, signs or symptoms of bleeding or clotting, or a surgery/procedure is scheduled. Due for next call no later than: 12/15/24.    Plan made per [facility] anticoagulation protocol    Gabriela Sosa RN  9/16/2024  Anticoagulation Clinic  [facility] for routing messages: [employer] [facility]  [facility] patient phone line: [phone]        _______________________________________________________________________     Anticoagulation Episode Summary       Current INR goal:  2.0-3.0   TTR:  80.4% (1 y)   Target end date:  Indefinite    Send INR reminders to:  [facility]IT[facility]    Indications    Long-term (current) use of anticoagulants [Z79.01] [Z79.01]  Atrial fibrillation (H) (Resolved) [I48.91]  Antiphospholipid antibody syndrome (H24) (Resolved) [D68.61]  Personal history of DVT (deep vein thrombosis) (Resolved) [Z86.718]  Atrial fibrillation  unspecified type (H) (Resolved) [I48.91]  Paroxysmal atrial fibrillation (H) [I48.0]  Chronic atrial fibrillation (H) (Resolved) [I48.20]  Personal history of PE (pulmonary embolism) [Z86.711]             Comments:  JESSICA rodas to manage by exception             Anticoagulation Care Providers       Provider Role Specialty Phone number    Anya Nowak MD Referring Family Medicine [phone]    Elizabeth Balderas MD Referring HOSPITALIST [phone]

## 2024-09-18 NOTE — PROGRESS NOTES
Diagnosis/Summary/Recommendations:    PATIENT: Haresh Rock  74 year old male     : 1950    KEN: Oct 14, 2024       MRN: [id number]  MRN: [id number]  [address] [place] [address]  [phone] (M)  [phone] (H)  [email or web address]     - sheyla Ramirez - son  Gemma - daughter  Sheyla Rock  [phone]     [email or web address],   [email or web address]  [email or web address]     [phone]     Sitting in a lazy boy     [phone]     Assessment:  (G20) Parkinson disease (H)  (primary encounter diagnosis)     Dopamine scan - (datscan)  11/3/2020  A presynaptic dopaminergic deficit is demonstrated bilaterally.     Review of diagnosis    parkinson  Avoidance of dopamine blockers yes  Motor complication review  -   Review of Impulse control disorders no  Review of surgical or medication options yes     Avoidance of dopamine blockers   Not ta josiane     Motor complication review   no     Review of Impulse control disorders   denies     Review of surgical or medication options   reviewed     Gait/Balance/Falls   No falls     Exercise/Therapy performed/offered   Doing shoveling snow     Cognitive/Driving   Driving - not problems  No cognitive problems - no changes        Mood   Daughter gemma with bipolar - 2 or 3 miles away   Son july with mood issues  2 or 3 miles away   He has some depression     Living with Sheyla - has ongoing back pain - getting a tooth extracted this afternoon;  Constipation, heart and bladder issues.   Constipation or diarrhea - has problems controlling her bowels     Hallucinations/delusions   No hallucinations     Sleep   Pseudoeph-doxylamine DM APAP nyquil - not using  Sleep pretty well  Nocturia 2/noc  Falls asleep during the day  Napping during the day  Falls asleep when watching news  Falls asleep during the afternoon  Up 3/noc for bathroom   Midnight goes to bed and up at 8am  Naps during day   Not talking in sleep or snoring.  No unusual behaviors  Wakes up before 3am, 5/6a and  7am     Bladder/Renal/Prostate/Gyn/Other  Renal function.   May have stage 2 disease.      Prostate - denies problems. He has has nocturia couple times per night.   Denies elevated psa     Prostate surgery; urinating better     Dutasteride avodart 0.5mg - off   Tamsulosin flomax 0.4mg - stopped  Had low blood pressure and fluids     July 5th ED visit and got catheter  Surgery yesterday laser and should get catheter out  Dr Jose G Hawley     GI/Constipation/GERD   Swallow study 9/22/2020  1. Multiple events of deep laryngeal penetration with thinner barium consistencies with intermittent aspiration.      Gallstones but has not gallbladder removed.  Had a gallbladder attack x 1 and is not on actigall     Bisacodyl dulcolax 5mg EC tablet - not using   Calcium carbonate tums 500mg ?  Nutritional supplement - 1 bottle per day  - not using   Pantoprazole protonix 20mg - not using  Polythyelene glycol miralax - not using   Prevident 5000 dry mouth - using  Senna-docusate senexon-S senokot-S  - no  Weight loss better with feeding tube  No constipation  Has some seepage  When urinates he has some stool     Had bloating and blockage with the feeding tube placement and had emergency surgery   10/29/2021 Scan showed problems     Feeding tube has helped him gain 20 lbs - he gets feedings 1 hour after medications so 9am, 3pm and 9pm     Takes parkinson medications by mouth for the first two doses and crush the last one of the day through the feeding tube  Schedule is 8am, 2pm and 8pm     With his fecal seepage discussed the use of a bidet or adapting his toilet with one - h e has a raised toilet seat so not clear what needs to be done. Consider seeing Occupational therapy.      Formula switched from abbott to nestle  161 or so lbs        ENDO/Lipid/DM/Bone density/Thyroid  Cholesterol  Rosuvastatin crestor 40mg   Vitamin D3 cholecalciferol 50 mcg 2000 units  Had diabetes in the past from prednisone  And this may have resolved  May  have a thyroid nodule.  TSH was normal.   A1c was 5.5         Cardio/heart/Hyper or Hypotensive   Flecainide tambocor 50mg twice daily  Metoprolol succinate ER Toprol XL 25mg 24 hr tablet  Blood pressure has been okay   122/63  Low bp has cleared up  MRI of heart next week     Vision/Dry Eyes/Cataracts/Glaucoma/Macular   Wears glasses  Eye - left eye has been poor since age 2 due to a lazy eye - amblyopia  Right eye post cataract surgery had an artificial lens put in and does not wear glasses till the evening         Heme/Anticoagulation/Antiplatelet/Anemia/Other  Folic acid folvite 1mg - not taking   Anticoagulated  Warfarin     Hereditary hemochromatosis gene - gene that increases his risk and has not had problems with this. Had had phlebotomies x 2 in the past.      AYUSH Torres     History of pulmonary embolii - back in 2004. This was related to blood clotting problems.   May have had a DVT. Reportedly has had antiphospholipid antibody. He is on coumadin.      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 Had a transplant from Dr. 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  No problems since then.      Graft vs host     He has high sed rate and will be seeing ID next week   crp was 12.8  Sed rate was 90  8/3/2021  Working with Marcelo Jacques     ENT/Resp  Hodgkin's lymphoma in the past 2011 and had chemotherapy for this and followed with CT scan and reportedly this is gone.      Pulmonary hypertension in the past.   Fluticasone flonase 50 mcg/act nasal spray  Loratadine claritin 10mg    CT of lung next      Hearing. Feels he has wax in his left ear and partial problem with the right ear. It is variable problem. Has used ear wax removal.      Feeding tube has helped him gain 20 lbs - he gets feedings 1 hour after medications so 9am, 3pm and 9pm     Skin/Cancer/Seborrhea/other  No skin cancer     Musculoskeletal/Pain/Headache  bilateral knee replacements.    "  Other:  He reports a neuropathy and that his foot drop has resolved. 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     No constipation - bowels are a bit loose  Getting feedings - using nestle product  He has reflux -  Has a pillow but not a pillow wedge  He has to brush his teeth several times daily  Avoiding water pik because of difficulty handling secretions   Teeth gets crusty - tartar     May want to talk with his pcp about the omeprazole - should he be taking a different product or/and higher dose  Famotidine (pepcid) or Pantoprazole (protonix)     Overall his parkinson is stable  No falls  Refilled his sinemet that is [facility]     Ongoing nocturia - had tried medication in the past that did not help  Not sure if he has been on mirabegron/myrbetriq, ubegron/gemtesa  Has seen Dr. Hawley jan 2023   Benign prostatic hyperplasia with urinary retention  (primary encounter diagnosis)  Comment: Bladder scan showed minimal residual urine today.  (N45.1) Epididymitis     Blood pressure has been stable  Anticoagulated   Using flecainide for his heart     Ongoing hematological care with Dr. JUNIOR     Not exercising  No falls  He arrives today by himself  He had an ulcer left foot - was wearing a boot and was off balance and gave up on it and was healed   He has a neuropathy and had a stick that stuck there that he was unaware of - and a woman who does his toes identified it and sent him in to see his doctor     He has not noticed wearing off - taking his medication 3/day      Ordered big and loud t herapy =      Swallowing - he had some problems with tylenol caplet and coughed it up as he had problems swallowing it.   He got one tylenol down and was not able to get the other pill down  He has a lot of  \"reflux\" due to the osmolyte formula  He has some cramping with his bowels - some gas  Bowels are explosive and loose and every few days  He has some stool every day and is loose  Not well formed " "- more \"pelletized\"     He is urinating 4-5 times per night     He is up during the night  He has achiness of his hip - he gets up and this helps his hip pain.      He has a new cat that sleeps on his bed and wakes him up -   Feeds her before  Sleeps during the day and up at night   Cat was scared initially when they got the cat from shelter in [place]      Blood pressure has been stable  Anticoagulated   Using flecainide for his heart  Metoprolol      Angelica Rock - wife - bladder surgery and urinary issues - suprapubic catheter     James - son - working in [place] and has house in [facility]  - trying to get a local job.  Working on boost service.   Gemma - daughter - lives in  [place] and working with [employer] and immigration policy     Blood work today  Paul tomorrow virtual     Bekah Matt appointment cardiology 10/10     Covid Pizer 11/6/2023 - needs to be cancelled as got booster already      Return back 6 months.     Completed PT, SLP and OT and found the OT the most helpful as he had done PT and SLP in the past and had a bit of repetition to it and plateaued from benefit.      Taking his sinemet by mouth int he morning and swallows the 2nd dose in the afternoon and crushes his evening dose with other medications and puts in the tube.      He has more tremor and use to be just the right hand and now the left hand as well in the evening watching tv  Discussed protein and sinemet interaction and he is presently taking his medication usually 1 hour  before food.   His shaking is before his evening pills which he takes at 8pm. He has some double tapping on his phone and keyboard.      No falls but when he gets up in the middle of the night he is tipsy and he is careful.      He has urinary frequency. Seen urologist last year. Jose G Hawley  He has not been on medication for his bladder  He has not been on mirabegron/myrbetriq  He has not been on gemtesa/ubegron  As best I can determine.    "   Pharmacy (MTM) consultation and medication management       Flecainide tambocor 50mg  Metoprolol tartrate 25mg   Blood pressure was 105/61 and heart rate was 67 today  He has had some light headedness but his readings are not low from his phone on review today.      6/4/2024 abdominal ultrasound  6/11/2024 return visit to see Dr. Miller and blood work   Transplant 2007  Lymphoma in the past  He is anticoagulated      He has not had choking but has some reflux and is always taste things. He does not have much appetite.   He is using a formula 2 cans 3 times per day  osmolyte   Taking omeprazole in the morning.   He rarely uses the prevident as he has swallowing problems and is easier without anything on his brush  He is getting his teeth cleaned every 4 months  He is taking claritin daily      Bowels are pretty - firm to loose and not taking anything and has variable where some days he is loose and other days he is not having a bowel movement for a few days.      Sleep is stable. He sleeps separately from his wife who has cpap and a suprapubic and he listens to [facility] radio. He listens to NPR, CBC and [employer]. ?hallucinations - tactile at night vs muscle twitching.      Right hip pain and sleeping on the right side - not having daytime pain  Right leg is weaker.     Angelica Rock - wife - bladder surgery and urinary issues - suprapubic catheter     James - son - working in [place] and has house in [facility]  - trying to get a local job.  Working on boost service.   Gemma - daughter - lives in  [place] and working with [employer] and immigration policy    Consider taking 1.5 tabs of sinemet at 2pm  Consider parcopa 25/100 1/2 tab as needed in the evening - this dissolves in your mouth. - pharmacist lizeth bocanegra says not available  Pharmacy input - Pharmacy (MTM) consultation and medication management  Please call the scheduling number @ [phone] to set up an appointment with pharmacists Leslie  Darrin or Pricila Saleem.   4. Return appointment  - last visit was 10/9/2023 and return in 6 months.   5. Bladder issues ?mirabegron            Medications      9a   2p  3p 730p  9p   Amoxicillin amoxil 500mg dentist             Carbidopa/levodopa Sinemet 25/100 1    1.5   1     Flecainide tambocor 50mg 1       1     Loratadine claritin 10mg  1             Metoprolol tartrate 25mg  1/2        1/2      MVI centrum 1             Nonform feeding tube osmolyte   2    2    2   Omeprazole prilosec 20mg 1             Prevident 5000 dry mouth rare             Rosuvastatin crestor 40mg         1     Senna-docusate 8.6-50 no             Starch-maltodextrin thick-it no             Vit D3 cholecalciferol 50 mcg 2000  ?             Warfarin anticoag coumadin 2.5         rx                            Plan:                  Coding statement:   Medical Decision Making:  #  Chronic progressive medical conditions addressed  - see above --   Review and/or interpretation of unique test or documentation from a provider outside of neurology ***   Independent historian provided additional details  *** I  Prescription drug management and review of potential side effects and/or monitoring for side effects  -- see above ---  Health impacted by social determinants of health  ***    I have reviewed the note as documented above.  This accurately captures the substance of my conversation with the patient and total time spent preparing for visit, executing visit and completing visit on the day of the visit:  *** minutes.  The portion of this total time included face to face time ***    The longitudinal plan of care for Haresh Rock was addressed during this visit. Due to the added complexity in care, I will continue to support Haresh Rock in the subsequent management of this condition(s) and with the ongoing continuity of care of this condition(s).      Ángel Hill MD     ______________________________________    Last visit date and  details:             ______________________________________      Patient was asked about 14 Review of systems including changes in vision (dry eyes, double vision), hearing, heart, lungs, musculoskeletal, depression, anxiety, snoring, RBD, insomnia, urinary frequency, urinary urgency, constipation, swallowing problems, hematological, ID, allergies, skin problems: seborrhea, endocrinological: thyroid, diabetes, cholesterol; balance, weight changes, and other neurological problems and these were not significant at this time except for   Patient Active Problem List   Diagnosis    Hjtry-beauzk-dglj disease, chronic (H)    Polyneuropathy    History of bilateral knee replacement    Bone marrow transplant status (H)    Hyperlipidemia LDL goal <70    Chronic rhinitis    Gallstones without obstruction of gallbladder    Long-term (current) use of anticoagulants [Z79.01]    Type 2 diabetes mellitus with stage 2 chronic kidney disease, without long-term current use of insulin (H)    History of Hodgkin's lymphoma    History of irradiation, presenting hazards to health    Hereditary hemochromatosis (H24)    Oropharyngeal dysphagia    Personal history of PE (pulmonary embolism)    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)    Thrombocytopenia (H24)    Paroxysmal atrial fibrillation (H)    Benign prostatic hyperplasia with urinary retention    Constipation    Parkinson's disease (H)    Gastrostomy tube in place (H)    Hammertoe, bilateral          Allergies   Allergen Reactions    Seasonal Allergies      Past Surgical History:   Procedure Laterality Date    ANESTHESIA CARDIOVERSION  04/21/2011    Procedure:ANESTHESIA CARDIOVERSION; Surgeon:GENERIC ANESTHESIA PROVIDER; Location:[facility] OR    ARTHROSCOPY KNEE RT/LT      LT    CATARACT IOL, RT/LT  2017    COLONOSCOPY  10/16/2012    Procedure: COLONOSCOPY;  Colonoscopy, screening;  Surgeon: Reg Feliciano MD;  Location:  OR    COLONOSCOPY N/A 04/14/2021    Procedure: COLONOSCOPY;   Surgeon: Reg Holm MD;  Location: [facility] GI    ESOPHAGOSCOPY, GASTROSCOPY, DUODENOSCOPY (EGD), COMBINED N/A 10/07/2020    Procedure: ESOPHAGOGASTRODUODENOSCOPY, WITH BIOPSY;  Surgeon: Raul Sawyer DO;  Location: [facility] OR    H ABLATION FOCAL AFIB  03/05/2013    PVI    H ABLATION FOCAL AFIB  01/01/2018    HC BONE MARROW/STEM TRANSPLANT ALLOGENIC  05/08/2007    INSERT PORT VASCULAR ACCESS      IR GASTROSTOMY TUBE PERCUTANEOUS PLCMNT  10/25/2021    JOINT REPLACEMTN, KNEE RT/LT  03/28/2012    SHAWN    LAPAROSCOPY DIAGNOSTIC (GENERAL) N/A 10/29/2021    Procedure: LAPAROSCOPY, DIAGNOSTIC, TAKEDOWN OF MALPOSITIONED GASTROSTOMY TUBE, INSERTION OF GASTROSTOMY TUBE, SMALL BOWEL RESECTION X1, PRIMARY REPAIR OF SMALL BOWEL ENTEROTOMY, ABDOMINAL WASHOUT, TAP BLOCK;  Surgeon: Leif Finney DO;  Location: [facility] OR    PEG TUBE PLACEMENT  10/25/2021    VASECTOMY  1990     Past Medical History:   Diagnosis Date    Abnormal dopamine scan (DaTSCAN) 2020 11/04/2020    [phone] 11/3/2020   Narrative & Impression   Examination: Nuclear medicine DATscan for Dopamine Receptor Localization.   Examination: NM Brain Image Tomographic (SPECT) DATscan   Date: 11/3/2020 3:21 PM   Indication: Parkinsonism   Comparison: None   Additional Information: none   Interfering Medications: None   Technique:   The patient initially received 1 ml of Lugol's solution orally prior    Antiphospholipid antibody syndrome (H24)     Ravi's, noted negative per testing re-done in 2008 in hematology note 01/13/2009    Articulation disorder 09/23/2020    Atrial fibrillation (H) 04/2011    Benign prostatic hyperplasia with urinary retention     Cirrhosis (H)     CKD (chronic kidney disease) stage 2, GFR 60-89 ml/min     Diabetes mellitus type II     steroid induced    DJD (degenerative joint disease) of knee     SHAWN, worse on right    DVT (deep venous thrombosis) (H)     ED (erectile dysfunction)     Gallbladder polyp 05/2010    Rpwga-Vcsbxr-Ouml  Disease 2007    BMT    Heart failure with preserved ejection fraction, NYHA class I (H)     Hemochromatosis     Hodgkin's disease NOS     5 cycles of ABVD in 2011    HTN (hypertension)     Hyperlipidaemia LDL goal <100     Multiple thyroid nodules     benign     Myeloproliferative disorder (H)     atypical, [facility]    Parkinson disease (H) 09/24/2020    PE (pulmonary embolism) 11/2004    Polyneuropathy     Pressure ulcer     Primary pulmonary hypertension (H)     Severe protein-calorie malnutrition (H24) 11/10/2021    Small bowel obstruction (H) 10/29/2021    Thrombocytopenia (H24) 06/08/2021    Thyroid nodule 05/17/2016     Social History     Socioeconomic History    Marital status:      Spouse name: Angelica    Number of children: 2    Years of education: 21    Highest education level: Not on file   Occupational History    Occupation:      Employer: [employer]     Employer: [employer]   Tobacco Use    Smoking status: Never    Smokeless tobacco: Never   Substance and Sexual Activity    Alcohol use: No    Drug use: No    Sexual activity: Not Currently     Partners: Female   Other Topics Concern    Parent/sibling w/ CABG, MI or angioplasty before 65F 55M? No   Social History Narrative     about 50 yrs        Wife has some health issues - back pain - second knee replaced    She had gastric bypass and a fib and bad mitral valve        Son in [place]    Daughter in [facility] with 8 yo son.         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[employer]        Born and raised in [place]              Social Determinants of Health     Financial Resource Strain: Low Risk  (11/28/2023)    Financial Resource Strain     Within the past 12 months, have you or your family members you live with been unable to get utilities (heat, electricity) when it was really needed?: No   Food Insecurity: Low Risk  (11/28/2023)    Food Insecurity     Within the past 12 months, did you worry that your food would  run out before you got money to buy more?: No     Within the past 12 months, did the food you bought just not last and you didn t have money to get more?: No   Transportation Needs: Low Risk  (11/28/2023)    Transportation Needs     Within the past 12 months, has lack of transportation kept you from medical appointments, getting your medicines, non-medical meetings or appointments, work, or from getting things that you need?: No   Physical Activity: Not on file   Stress: Not on file   Social Connections: Not on file   Interpersonal Safety: Not on file   Housing Stability: Low Risk  (11/28/2023)    Housing Stability     Do you have housing? : Yes     Are you worried about losing your housing?: No       Drug and lactation database from the [place] National Library of Medicine:  http://toxnet.nlm.nih.gov/cgi-bin/sis/htmlgen?LACT      B/P: Data Unavailable, T: Data Unavailable, P: Data Unavailable, R: Data Unavailable 0 lbs 0 oz  There were no vitals taken for this visit., There is no height or weight on file to calculate BMI.  Medications and Vitals not listed above were documented in the cart and reviewed by me.     Current Outpatient Medications   Medication Sig Dispense Refill    amoxicillin (AMOXIL) 500 MG capsule Take 2,000 mg by mouth once Take 1 hour prior to dental procedure      carbidopa-levodopa (SINEMET)  MG tablet Take 1 tablet daily at 8-9am, 1.5 tablets at 2pm and 1 tablet at 7-7:30 pm. May take additional half tablet as needed. Max of 4 tablets/day. 360 tablet 3    flecainide (TAMBOCOR) 50 MG tablet Take 1 tablet (50 mg) by mouth 2 times daily. 180 tablet 0    loratadine (CLARITIN REDITABS) 10 MG ODT Take 10 mg by mouth daily      metoprolol tartrate (LOPRESSOR) 25 MG tablet TAKE ONE-HALF (1/2) TABLET (12.5 MG) ORALLY OR BY FEEDING TUBE TWICE A DAY (CRUSH TABLET) 90 tablet 0    NONFORMULARY Abbott osmolyte feedings 2 cans 3/day      omeprazole (PRILOSEC) 20 MG DR capsule TAKE 1 CAPSULE EVERY  MORNING 90 capsule 3    PREVIDENT 5000 DRY MOUTH 1.1 % GEL topical gel Apply to affected area daily as needed Or brushes with water  3    rosuvastatin (CRESTOR) 40 MG tablet TAKE 1 TABLET AT BEDTIME 90 tablet 3    tamsulosin (FLOMAX) 0.4 MG capsule Take 1 capsule (0.4 mg) by mouth daily 30 capsule 3    warfarin ANTICOAGULANT (COUMADIN) 2.5 MG tablet Take 2.5 mg Tues, Thurs, Sat and 3.75 mg all other days, or as directed by the Coumad[facility] Clinic 110 tablet 1         Ángel Hill MD     initially received 1 ml of Lugol's solution orally prior    Antiphospholipid antibody syndrome (H24)     Ravi's, noted negative per testing re-done in 2008 in hematology note 01/13/2009    Articulation disorder 09/23/2020    Atrial fibrillation (H) 04/2011    Benign prostatic hyperplasia with urinary retention     Cirrhosis (H)     CKD (chronic kidney disease) stage 2, GFR 60-89 ml/min     Diabetes mellitus type II     steroid induced    DJD (degenerative joint disease) of knee     SHAWN, worse on right    DVT (deep venous thrombosis) (H)     ED (erectile dysfunction)     Gallbladder polyp 05/2010    Mnltq-Uwpzuv-Eqcf Disease 2007    BMT    Heart failure with preserved ejection fraction, NYHA class I (H)     Hemochromatosis     Hodgkin's disease NOS     5 cycles of ABVD in 2011    HTN (hypertension)     Hyperlipidaemia LDL goal <100     Multiple thyroid nodules     benign     Myeloproliferative disorder (H)     atypical, [facility]    Parkinson disease (H) 09/24/2020    PE (pulmonary embolism) 11/2004    Polyneuropathy     Pressure ulcer     Primary pulmonary hypertension (H)     Severe protein-calorie malnutrition (H24) 11/10/2021    Small bowel obstruction (H) 10/29/2021    Thrombocytopenia (H24) 06/08/2021    Thyroid nodule 05/17/2016     Social History     Socioeconomic History    Marital status:      Spouse name: Angelica    Number of children: 2    Years of education: 21    Highest education level: Not on file   Occupational History    Occupation:      Employer: [employer]     Employer: [employer]   Tobacco Use    Smoking status: Never    Smokeless tobacco: Never   Substance and Sexual Activity    Alcohol use: No    Drug use: No    Sexual activity: Not Currently     Partners: Female   Other Topics Concern    Parent/sibling w/ CABG, MI or angioplasty before 65F 55M? No   Social History Narrative     about 50 yrs        Wife has some health issues - back pain - second knee replaced    She had  gastric bypass and a fib and bad mitral valve        Son in [place]    Daughter in [facility] with 10 yo son.         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[facility]        Born and raised in [facility]              Social Determinants of Health     Financial Resource Strain: Low Risk  (11/28/2023)    Financial Resource Strain     Within the past 12 months, have you or your family members you live with been unable to get utilities (heat, electricity) when it was really needed?: No   Food Insecurity: Low Risk  (11/28/2023)    Food Insecurity     Within the past 12 months, did you worry that your food would run out before you got money to buy more?: No     Within the past 12 months, did the food you bought just not last and you didn t have money to get more?: No   Transportation Needs: Low Risk  (11/28/2023)    Transportation Needs     Within the past 12 months, has lack of transportation kept you from medical appointments, getting your medicines, non-medical meetings or appointments, work, or from getting things that you need?: No   Physical Activity: Not on file   Stress: Not on file   Social Connections: Not on file   Interpersonal Safety: Not on file   Housing Stability: Low Risk  (11/28/2023)    Housing Stability     Do you have housing? : Yes     Are you worried about losing your housing?: No       Drug and lactation database from the [place] [employer]:  http://toxnet.nlm.nih.gov/cgi-bin/sis/htmlgen?LACT      B/P: Data Unavailable, T: Data Unavailable, P: Data Unavailable, R: Data Unavailable 0 lbs 0 oz  There were no vitals taken for this visit., There is no height or weight on file to calculate BMI.  Medications and Vitals not listed above were documented in the cart and reviewed by me.     Current Outpatient Medications   Medication Sig Dispense Refill    amoxicillin (AMOXIL) 500 MG capsule Take 2,000 mg by mouth once Take 1 hour prior to dental procedure       carbidopa-levodopa (SINEMET)  MG tablet Take 1 tablet daily at 8-9am, 1.5 tablets at 2pm and 1 tablet at 7-7:30 pm. May take additional half tablet as needed. Max of 4 tablets/day. 360 tablet 3    flecainide (TAMBOCOR) 50 MG tablet Take 1 tablet (50 mg) by mouth 2 times daily. 180 tablet 0    loratadine (CLARITIN REDITABS) 10 MG ODT Take 10 mg by mouth daily      metoprolol tartrate (LOPRESSOR) 25 MG tablet TAKE ONE-HALF (1/2) TABLET (12.5 MG) ORALLY OR BY FEEDING TUBE TWICE A DAY (CRUSH TABLET) 90 tablet 0    NONFORMULARY Abbott osmolyte feedings 2 cans 3/day      omeprazole (PRILOSEC) 20 MG DR capsule TAKE 1 CAPSULE EVERY MORNING 90 capsule 3    PREVIDENT 5000 DRY MOUTH 1.1 % GEL topical gel Apply to affected area daily as needed Or brushes with water  3    rosuvastatin (CRESTOR) 40 MG tablet TAKE 1 TABLET AT BEDTIME 90 tablet 3    tamsulosin (FLOMAX) 0.4 MG capsule Take 1 capsule (0.4 mg) by mouth daily 30 capsule 3    warfarin ANTICOAGULANT (COUMADIN) 2.5 MG tablet Take 2.5 mg Tues, Thurs, Sat and 3.75 mg all other days, or as directed by the [facility] 110 tablet 1         Ángel Hill MD

## 2024-09-20 ENCOUNTER — ANTICOAGULATION THERAPY VISIT (OUTPATIENT)
Dept: ANTICOAGULATION | Facility: CLINIC | Age: 74
End: 2024-09-20
Payer: COMMERCIAL

## 2024-09-20 DIAGNOSIS — I48.0 PAROXYSMAL ATRIAL FIBRILLATION (H): ICD-10-CM

## 2024-09-20 DIAGNOSIS — Z86.711 PERSONAL HISTORY OF PE (PULMONARY EMBOLISM): ICD-10-CM

## 2024-09-20 DIAGNOSIS — Z79.01 LONG TERM CURRENT USE OF ANTICOAGULANT THERAPY: Primary | ICD-10-CM

## 2024-09-20 LAB — INR HOME MONITORING: 2.3 RATIO (ref 2–3)

## 2024-09-20 NOTE — PROGRESS NOTES
ANTICOAGULATION MANAGEMENT     Haresh Rock 74 year old male is on warfarin with therapeutic INR result. (Goal INR 2.0-3.0)    Recent labs: (last 7 days)     09/20/24  1308   INR 2.3       ASSESSMENT     Source(s): Chart Review  Previous INR was Therapeutic last visit; previously outside of goal range  Medication, diet, health changes since last INR chart reviewed; none identified         PLAN     Unable to reach Haresh today.    Left message to continue current dose 2.5 mg Fri/Sat & 3.75 mg Sun this weekend. Request call back for assessment.    Follow up required to confirm warfarin dose taken and assess for changes    Julia Tripp, RN  9/20/2024  Anticoagulation Clinic  [facility] for routing messages: [facility]  [facility] patient phone line: [phone]

## 2024-09-20 NOTE — PROGRESS NOTES
ANTICOAGULATION MANAGEMENT     Haresh Rock 74 year old male is on warfarin with therapeutic INR result. (Goal INR 2.0-3.0)    Recent labs: (last 7 days)     09/20/24  1308   INR 2.3       ASSESSMENT     Source(s): Chart Review and Patient/Caregiver Call     Warfarin doses taken: Warfarin taken as instructed  Diet: No new diet changes identified  Medication/supplement changes: None noted  New illness, injury, or hospitalization: No  Signs or symptoms of bleeding or clotting: No  Previous result: Therapeutic last visit; previously outside of goal range  Additional findings: None       PLAN     Recommended plan for no diet, medication or health factor changes affecting INR     Dosing Instructions: Continue your current warfarin dose with next INR in 1 week       Summary  As of 9/20/2024      Full warfarin instructions:  3.75 mg every Sun, Tue, Thu; 2.5 mg all other days   Next INR check:  9/27/2024               Telephone call with Haresh who verbalizes understanding and agrees to plan    Patient to recheck with home meter    Education provided: Please call back if any changes to your diet, medications or how you've been taking warfarin  Symptom monitoring: monitoring for bleeding signs and symptoms and monitoring for clotting signs and symptoms    Plan made per [facility] anticoagulation protocol    Julia Tripp RN  9/20/2024  Anticoagulation Clinic  [facility] for routing messages: p [facility]IT[facility]  [facility] patient phone line: [phone]        _______________________________________________________________________     Anticoagulation Episode Summary       Current INR goal:  2.0-3.0   TTR:  80.5% (1 y)   Target end date:  Indefinite   Send INR reminders to:  [facility]    Indications    Long-term (current) use of anticoagulants [Z79.01] [Z79.01]  Atrial fibrillation (H) (Resolved) [I48.91]  Antiphospholipid antibody syndrome (H24) (Resolved) [D68.61]  Personal history of DVT (deep vein  thrombosis) (Resolved) [Z86.718]  Atrial fibrillation  unspecified type (H) (Resolved) [I48.91]  Paroxysmal atrial fibrillation (H) [I48.0]  Chronic atrial fibrillation (H) (Resolved) [I48.20]  Personal history of PE (pulmonary embolism) [Z86.711]             Comments:  JESSICA rodas to manage by exception             Anticoagulation Care Providers       Provider Role Specialty Phone number    Anya Nowak MD Referring Family Medicine [phone]    Elizabeth Balderas MD Referring HOSPITALIST [phone]

## 2024-09-23 ENCOUNTER — TELEPHONE (OUTPATIENT)
Dept: FAMILY MEDICINE | Facility: CLINIC | Age: 74
End: 2024-09-23
Payer: COMMERCIAL

## 2024-09-23 NOTE — TELEPHONE ENCOUNTER
We have been unable to reach this patient for MTM follow-up after several attempts. We will stop reaching out to the patient at this time. Please let us know if we can assist in this patient's care in the future.     Emi Chen, PharmD  Medication Therapy Management Pharmacist  [phone]

## 2024-09-25 NOTE — PROGRESS NOTES
----- Message from Yudith Soto sent at 10/18/2018  3:04 PM CDT -----  Contact: [facility]  Placed new prescription fax form in  Luiza's in box for medication PROAIR INH 8.5 MCG   2023: MED[employer] ADVANTAGE PLAN FOLLOWS MEDICARE CRITERIA: NO COVERAGE FOR TPA, LINE CARE AND HYDRATION, OR ONPRO.    10/26/2021 PT MEETS MEDICARE CRITERIA FOR ENTERAL. ANTICIPATED LENGTH OF THERAPY MUST BE 90 DAYS OR GREATER AT THE TIME OF SERVICE.  OTHER THERAPY: RETURN TO INTAKE FOR COVERAGE DETERMINATION

## 2024-09-27 ENCOUNTER — ANTICOAGULATION THERAPY VISIT (OUTPATIENT)
Dept: ANTICOAGULATION | Facility: CLINIC | Age: 74
End: 2024-09-27
Payer: COMMERCIAL

## 2024-09-27 DIAGNOSIS — I48.0 PAROXYSMAL ATRIAL FIBRILLATION (H): ICD-10-CM

## 2024-09-27 DIAGNOSIS — Z79.01 LONG TERM CURRENT USE OF ANTICOAGULANT THERAPY: Primary | ICD-10-CM

## 2024-09-27 DIAGNOSIS — Z86.711 PERSONAL HISTORY OF PE (PULMONARY EMBOLISM): ICD-10-CM

## 2024-09-27 LAB — INR HOME MONITORING: 2.8 RATIO (ref 2–3)

## 2024-10-03 ENCOUNTER — TRANSFERRED RECORDS (OUTPATIENT)
Dept: HEALTH INFORMATION MANAGEMENT | Facility: CLINIC | Age: 74
End: 2024-10-03

## 2024-10-04 ENCOUNTER — DOCUMENTATION ONLY (OUTPATIENT)
Dept: ANTICOAGULATION | Facility: CLINIC | Age: 74
End: 2024-10-04
Payer: COMMERCIAL

## 2024-10-04 DIAGNOSIS — Z79.01 LONG TERM CURRENT USE OF ANTICOAGULANT THERAPY: Primary | ICD-10-CM

## 2024-10-04 DIAGNOSIS — I48.0 PAROXYSMAL ATRIAL FIBRILLATION (H): ICD-10-CM

## 2024-10-04 DIAGNOSIS — Z86.711 PERSONAL HISTORY OF PE (PULMONARY EMBOLISM): ICD-10-CM

## 2024-10-04 LAB — INR HOME MONITORING: 2.6 RATIO (ref 2–3)

## 2024-10-04 NOTE — PROGRESS NOTES
ANTICOAGULATION  MANAGEMENT-Home Monitor Managed by Exception    Haresh EMILIE Rock 74 year old male is on warfarin with therapeutic INR result. (Goal INR 2.0-3.0)    Recent labs: (last 7 days)     10/04/24  0807   INR 2.6       Previous INR was Therapeutic  Medication, diet, health changes since last INR:chart reviewed; none identified  Contacted within the last 12 weeks by phone on 9/27/24  Due for next call no later than: 12/26/24.   Last [facility] referral date: 05/24/2024      MINNIE Hutson was NOT contacted regarding therapeutic result today per home monitoring policy manage by exception agreement.   Current warfarin dose is to be continued:     Summary  As of 10/4/2024      Full warfarin instructions:  3.75 mg every Sun, Tue, Thu; 2.5 mg all other days   Next INR check:  10/11/2024             ?   Blanca Rodriguez RN  Anticoagulation Clinic  10/4/2024    _______________________________________________________________________     Anticoagulation Episode Summary       Current INR goal:  2.0-3.0   TTR:  81.4% (1 y)   Target end date:  Indefinite   Send INR reminders to:  [facility]    Indications    Long-term (current) use of anticoagulants [Z79.01] [Z79.01]  Atrial fibrillation (H) (Resolved) [I48.91]  Antiphospholipid antibody syndrome (H) (Resolved) [D68.61]  Personal history of DVT (deep vein thrombosis) (Resolved) [Z86.718]  Atrial fibrillation  unspecified type (H) (Resolved) [I48.91]  Paroxysmal atrial fibrillation (H) [I48.0]  Chronic atrial fibrillation (H) (Resolved) [I48.20]  Personal history of PE (pulmonary embolism) [Z86.711]             Comments:  JESSICA rodas to manage by exception             Anticoagulation Care Providers       Provider Role Specialty Phone number    Anya Nowak MD Referring Family Medicine [phone]    Elizabeth Balderas MD Referring HOSPITALIST [phone]

## 2024-10-07 DIAGNOSIS — I48.0 PAROXYSMAL ATRIAL FIBRILLATION (H): ICD-10-CM

## 2024-10-07 RX ORDER — METOPROLOL TARTRATE 25 MG/1
TABLET, FILM COATED ORAL
Qty: 90 TABLET | Refills: 0 | Status: SHIPPED | OUTPATIENT
Start: 2024-10-07

## 2024-10-14 ENCOUNTER — OFFICE VISIT (OUTPATIENT)
Dept: NEUROLOGY | Facility: CLINIC | Age: 74
End: 2024-10-14
Payer: COMMERCIAL

## 2024-10-14 VITALS
SYSTOLIC BLOOD PRESSURE: 110 MMHG | HEIGHT: 71 IN | HEART RATE: 71 BPM | BODY MASS INDEX: 24.92 KG/M2 | DIASTOLIC BLOOD PRESSURE: 68 MMHG | WEIGHT: 178 LBS

## 2024-10-14 DIAGNOSIS — K11.7 DROOLING: ICD-10-CM

## 2024-10-14 DIAGNOSIS — R39.9 LOWER URINARY TRACT SYMPTOMS (LUTS): ICD-10-CM

## 2024-10-14 DIAGNOSIS — R35.1 NOCTURIA: ICD-10-CM

## 2024-10-14 DIAGNOSIS — Z98.890 HISTORY OF TRANSURETHRAL RESECTION OF PROSTATE: ICD-10-CM

## 2024-10-14 DIAGNOSIS — Z90.79 HISTORY OF TRANSURETHRAL RESECTION OF PROSTATE: ICD-10-CM

## 2024-10-14 DIAGNOSIS — G20.A1 PARKINSON'S DISEASE, UNSPECIFIED WHETHER DYSKINESIA PRESENT, UNSPECIFIED WHETHER MANIFESTATIONS FLUCTUATE (H): Primary | ICD-10-CM

## 2024-10-14 PROCEDURE — 99214 OFFICE O/P EST MOD 30 MIN: CPT | Performed by: PSYCHIATRY & NEUROLOGY

## 2024-10-14 PROCEDURE — G2211 COMPLEX E/M VISIT ADD ON: HCPCS | Performed by: PSYCHIATRY & NEUROLOGY

## 2024-10-14 RX ORDER — IPRATROPIUM BROMIDE 42 UG/1
SPRAY, METERED NASAL
Qty: 15 ML | Refills: 11 | Status: SHIPPED | OUTPATIENT
Start: 2024-10-14

## 2024-10-14 RX ORDER — CARBIDOPA AND LEVODOPA 25; 100 MG/1; MG/1
TABLET ORAL
Qty: 405 TABLET | Refills: 3 | Status: SHIPPED | OUTPATIENT
Start: 2024-10-14

## 2024-10-14 NOTE — LETTER
10/14/2024      Haresh Rock  [address] [place] [address]      Dear Colleague,    Thank you for referring your patient, Haresh Rock, to the [facility] [place]. Please see a copy of my visit note below.      Diagnosis/Summary/Recommendations:    PATIENT: Haresh Rock  74 year old male     : 1950    KEN: Oct 14, 2024       MRN: [id number]  MRN: [id number]  [address] [place] [address]  [phone] (M)  [phone] (H)  [email or web address]     - sheyla Ramirez - son  Gemma - daughter  Sheyla Rock  [phone]     [email or web address],   [email or web address]  [email or web address]     [phone]     Sitting in a lazy boy     [phone]     Assessment:  (G20) Parkinson disease (H)  (primary encounter diagnosis)     Dopamine scan - (datscan)  11/3/2020  A presynaptic dopaminergic deficit is demonstrated bilaterally.     Review of diagnosis    parkinson  Avoidance of dopamine blockers yes  Motor complication review  -   Review of Impulse control disorders no  Review of surgical or medication options yes     Avoidance of dopamine blockers   Not ta josiane     Motor complication review   no     Review of Impulse control disorders   denies     Review of surgical or medication options   reviewed     Gait/Balance/Falls   No falls     Exercise/Therapy performed/offered   Doing shoveling snow     Cognitive/Driving   Driving - not problems  No cognitive problems - no changes        Mood   Daughter gemma with bipolar - 2 or 3 miles away   Son july with mood issues  2 or 3 miles away   He has some depression     Living with Sheyla - has ongoing back pain - getting a tooth extracted this afternoon;  Constipation, heart and bladder issues.   Constipation or diarrhea - has problems controlling her bowels     Hallucinations/delusions   No hallucinations     Sleep   Pseudoeph-doxylamine DM APAP nyquil - not using  Sleep pretty well  Nocturia 2/noc  Falls asleep during the day  Napping  during the day  Falls asleep when watching news  Falls asleep during the afternoon  Up 3/noc for bathroom   Midnight goes to bed and up at 8am  Naps during day   Not talking in sleep or snoring.  No unusual behaviors  Wakes up before 3am, 5/6a and 7am     Bladder/Renal/Prostate/Gyn/Other  Renal function.   May have stage 2 disease.      Prostate - denies problems. He has has nocturia couple times per night.   Denies elevated psa     Prostate surgery; urinating better     Dutasteride avodart 0.5mg - off   Tamsulosin flomax 0.4mg - stopped  Had low blood pressure and fluids     July 5th ED visit and got catheter  Surgery yesterday laser and should get catheter out  Dr Jose G Hawley     GI/Constipation/GERD   Swallow study 9/22/2020  1. Multiple events of deep laryngeal penetration with thinner barium consistencies with intermittent aspiration.      Gallstones but has not gallbladder removed.  Had a gallbladder attack x 1 and is not on actigall     Bisacodyl dulcolax 5mg EC tablet - not using   Calcium carbonate tums 500mg ?  Nutritional supplement - 1 bottle per day  - not using   Pantoprazole protonix 20mg - not using  Polythyelene glycol miralax - not using   Prevident 5000 dry mouth - using  Senna-docusate senexon-S senokot-S  - no  Weight loss better with feeding tube  No constipation  Has some seepage  When urinates he has some stool     Had bloating and blockage with the feeding tube placement and had emergency surgery   10/29/2021 Scan showed problems     Feeding tube has helped him gain 20 lbs - he gets feedings 1 hour after medications so 9am, 3pm and 9pm     Takes parkinson medications by mouth for the first two doses and crush the last one of the day through the feeding tube  Schedule is 8am, 2pm and 8pm     With his fecal seepage discussed the use of a bidet or adapting his toilet with one - h e has a raised toilet seat so not clear what needs to be done. Consider seeing Occupational therapy.      Formula  switched from abbott to [employer]  161 or so lbs        ENDO/Lipid/DM/Bone density/Thyroid  Cholesterol  Rosuvastatin crestor 40mg   Vitamin D3 cholecalciferol 50 mcg 2000 units  Had diabetes in the past from prednisone  And this may have resolved  May have a thyroid nodule.  TSH was normal.   A1c was 5.5         Cardio/heart/Hyper or Hypotensive   Flecainide tambocor 50mg twice daily  Metoprolol succinate ER Toprol XL 25mg 24 hr tablet  Blood pressure has been okay   122/63  Low bp has cleared up  MRI of heart next week     Vision/Dry Eyes/Cataracts/Glaucoma/Macular   Wears glasses  Eye - left eye has been poor since age 2 due to a lazy eye - amblyopia  Right eye post cataract surgery had an artificial lens put in and does not wear glasses till the evening         Heme/Anticoagulation/Antiplatelet/Anemia/Other  Folic acid folvite 1mg - not taking   Anticoagulated  Warfarin     Hereditary hemochromatosis gene - gene that increases his risk and has not had problems with this. Had had phlebotomies x 2 in the past.      AYUSH Torres     History of pulmonary embolii - back in 2004. This was related to blood clotting problems.   May have had a DVT. Reportedly has had antiphospholipid antibody. He is on coumadin.      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 Had a transplant from Dr. 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  No problems since then.      Graft vs host     He has high sed rate and will be seeing ID next week   crp was 12.8  Sed rate was 90  8/3/2021  Working with Marcelo Jacques     ENT/Resp  Hodgkin's lymphoma in the past 2011 and had chemotherapy for this and followed with CT scan and reportedly this is gone.      Pulmonary hypertension in the past.   Fluticasone flonase 50 mcg/act nasal spray  Loratadine claritin 10mg    CT of lung next      Hearing. Feels he has wax in his left ear and partial problem with the right ear. It is variable problem. Has used  ear wax removal.      Feeding tube has helped him gain 20 lbs - he gets feedings 1 hour after medications so 9am, 3pm and 9pm     Skin/Cancer/Seborrhea/other  No skin cancer     Musculoskeletal/Pain/Headache  bilateral knee replacements.     Other:  He reports a neuropathy and that his foot drop has resolved. 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     No constipation - bowels are a bit loose  Getting feedings - using nestle product  He has reflux -  Has a pillow but not a pillow wedge  He has to brush his teeth several times daily  Avoiding water pik because of difficulty handling secretions   Teeth gets crusty - tartar     May want to talk with his pcp about the omeprazole - should he be taking a different product or/and higher dose  Famotidine (pepcid) or Pantoprazole (protonix)     Overall his parkinson is stable  No falls  [facility]illed his sinemet that is [facility]     Ongoing nocturia - had tried medication in the past that did not help  Not sure if he has been on mirabegron/myrbetriq, ubegron/gemtesa  Has seen Dr. Hawley jan 2023   Benign prostatic hyperplasia with urinary retention  (primary encounter diagnosis)  Comment: Bladder scan showed minimal residual urine today.  (N45.1) Epididymitis     Blood pressure has been stable  Anticoagulated   Using flecainide for his heart     Ongoing hematological care with Dr. JUNIOR     Not exercising  No falls  He arrives today by himself  He had an ulcer left foot - was wearing a boot and was off balance and gave up on it and was healed   He has a neuropathy and had a stick that stuck there that he was unaware of - and a woman who does his toes identified it and sent him in to see his doctor     He has not noticed wearing off - taking his medication 3/day      Ordered big and loud t herapy =      Swallowing - he had some problems with tylenol caplet and coughed it up as he had problems swallowing it.   He got one tylenol down and was not able  "to get the other pill down  He has a lot of  \"reflux\" due to the osmolyte formula  He has some cramping with his bowels - some gas  Bowels are explosive and loose and every few days  He has some stool every day and is loose  Not well formed - more \"pelletized\"     He is urinating 4-5 times per night     He is up during the night  He has achiness of his hip - he gets up and this helps his hip pain.      He has a new cat that sleeps on his bed and wakes him up -   Feeds her before  Sleeps during the day and up at night   Cat was scared initially when they got the cat from shelter in [place]      Blood pressure has been stable  Anticoagulated   Using flecainide for his heart  Metoprolol      Angelica Rock - wife - bladder surgery and urinary issues - suprapubic catheter     James - son - working in [place] and has house in [facility]  - trying to get a local job.  Working on boost service.   Gemma - daughter - lives in  [place] and working with [employer] and immigration policy     Blood work today  Paul tomorrow virtual     Bekah Matt appointment cardiology 10/10     Covid Pizer 11/6/2023 - needs to be cancelled as got booster already      Return back 6 months.     Completed PT, SLP and OT and found the OT the most helpful as he had done PT and SLP in the past and had a bit of repetition to it and plateaued from benefit.      Taking his sinemet by mouth int he morning and swallows the 2nd dose in the afternoon and crushes his evening dose with other medications and puts in the tube.      He has more tremor and use to be just the right hand and now the left hand as well in the evening watching tv  Discussed protein and sinemet interaction and he is presently taking his medication usually 1 hour  before food.   His shaking is before his evening pills which he takes at 8pm. He has some double tapping on his phone and keyboard.      No falls but when he gets up in the middle of the night he is tipsy " and he is careful.      He has urinary frequency. Seen urologist last year. Jose G Hawley  He has not been on medication for his bladder  He has not been on mirabegron/myrbetriq  He has not been on gemtesa/ubegron  As best I can determine.      Pharmacy ([facility]) consultation and medication management    Flecainide tambocor 50mg  Metoprolol tartrate 25mg   Blood pressure was 105/61 and heart rate was 67 today  He has had some light headedness but his readings are not low from his phone on review today.      6/4/2024 abdominal ultrasound  6/11/2024 return visit to see Dr. Miller and blood work   Transplant 2007  Lymphoma in the past  He is anticoagulated      He has not had choking but has some reflux and is always taste things. He does not have much appetite.   He is using a formula 2 cans 3 times per day  osmolyte   Taking omeprazole in the morning.   He rarely uses the prevident as he has swallowing problems and is easier without anything on his brush  He is getting his teeth cleaned every 4 months  He is taking claritin daily      Bowels are pretty - firm to loose and not taking anything and has variable where some days he is loose and other days he is not having a bowel movement for a few days.      Sleep is stable. He sleeps separately from his wife who has cpap and a suprapubic and he listens to [facility] radio. He listens to NPR, CBC and BBC. ?hallucinations - tactile at night vs muscle twitching.      Right hip pain and sleeping on the right side - not having daytime pain  Right leg is weaker.     Angelica Rock - wife - bladder surgery and urinary issues - suprapubic catheter     James - son - working in [place] and has house in [facility]  - trying to get a local job.  Working on boost service.   Gemma - daughter - lives in  [place] and working with [employer] and immigration policy    Consider taking 1.5 tabs of sinemet at 2pm  Consider parcopa 25/100 1/2 tab as needed in the evening - this  dissolves in your mouth. - pharmacist lizeth bocanegra says not available  Pharmacy input - Pharmacy ([facility]) consultation and medication management  Please call the scheduling number @ [phone] to set up an appointment with pharmacists Leslie Clifford or Pricila Saleem.   4. Return appointment  - last visit was 10/9/2023 and return in 6 months.   5. Bladder issues ?mirabegron            Medications      9a   2p  3p 730p  9p   Amoxicillin amoxil 500mg dentist             Carbidopa/levodopa Sinemet 25/100 1    1.5   1     Flecainide tambocor 50mg 1       1     Loratadine claritin 10mg  1             Metoprolol tartrate 25mg  1/2        1/2      MVI centrum 1             Nonform feeding tube osmolyte   2    2    2   Omeprazole prilosec 20mg 1             Prevident 5000 dry mouth rare             Rosuvastatin crestor 40mg         1     Senna-docusate 8.6-50 no             Starch-maltodextrin thick-it no             Vit D3 cholecalciferol 50 mcg 2000  ?             Warfarin anticoag coumadin 2.5         rx                            Plan:    He has an apple watch - and monitoring his sleep  He is sleeping at night to a radio and not sure if impacting his apple watch  He sleeps during the day and not sleeping at night.     He ha more speech issues - but will try to do the exercises  He has drooling at night. He is willing to try ipatropium nasal spray once or twice daily for drooling.   Denies pneumonia     Urinates every 1 or 1.5 hours. This is an issue.   He had tried flomax w hich did not help.   He has not been on mirabron/myrbetriq (tier 3/5) or gemtesa/ubegron (not on formulary)  He has not seen urology recently  Had a TURP in the past  Major issue is the urinary frequency.     He may be having illusions - seeing something out of the corner of his left eye.   His left eye is bad. No change in his Rx.     Cholesterol   Rosuvastatin crestor 40mg  Recent Labs   Lab Test 01/17/24  1428 10/11/22  1553   CHOL 95 92   HDL  33* 39*   LDL 34 40   TRIG 141 66     Flecainide tambocor 50mg twice daily   Metoprolol tartrate 25mg 1/2 twice daily   Blood pressure was 110/68 and heart rate was 71  Has future appointments with cardiology including and Echocardiogram    He has some balance issues and not climbing ladder to change light bulbs.   He has had 3 falls in the past month. Holding onto laundry basket and holding on to rail on stairs and missed a step and fell 5 steps from the bottom and landed on basket and knees and elbows hit the concrete. He is anticoagulated.   #2 Had a fall when not holding onto the rail.  #3 He had a fall taking out recycling - fell on a pile of boxes and had problems getting up. He is trying to be safer. He carries his feeding back up in the living room and may need to do a separate trip or have his wife help him out. He watches tv while getting his feedings.     Carbidopa/levodopa Sinemet 25/100 he is on 1  - 1.5 - 1   He has noticed some problems in the afternoon around 2 pm   [facility]   He may take 1 to 1.5 in the morning and 1.5 to 2 at 2pm and 1 at night     He is using the osmolyte 3 times per day  Fv was calling today during the visit about his feeding supplies.   His weight has been stable. He is on 2 cans 3/day    Angelica Rock - wife - bladder surgery and urinary issues - suprapubic catheter changed every month and still has incontinence around her urethra and has a pain pump for back pain. She was able to make a wedding in [place] last week with son James.  Sung stayed home.       James - son - working in [place] and has house in [place]  - working from [employer] and had ended contract and may be working for a [place] company remotely.     Gemma - daughter - lives in  [place] and working with [employer] and immigration policy - she may work as [employer]. She has her mood issues.     Sees Hematology every  6 months - followed by Melissa for 17 or 19 years for his lymphoma   He is on coumadin/anticoagulated  Sed Rate   Date Value Ref Range Status   06/08/2021 106 (H) 0 - 20 mm/h Final     Erythrocyte Sedimentation Rate   Date Value Ref Range Status   06/12/2024 71 (H) 0 - 20 mm/hr Final     CRP Inflammation   Date Value Ref Range Status   06/12/2024 11.60 (H) <5.00 mg/L Final     Ferritin was 149 as of 6/12/2024  Ferritin   Date Value Ref Range Status   06/12/2024 149 31 - 409 ng/mL Final   02/15/2021 268 26 - 388 ng/mL Final     Iron   Date Value Ref Range Status   06/12/2024 122 61 - 157 ug/dL Final   02/15/2021 78 35 - 180 ug/dL Final     Iron Binding Cap   Date Value Ref Range Status   02/15/2021 227 (L) 240 - 430 ug/dL Final     Iron Binding Capacity   Date Value Ref Range Status   06/12/2024 231 (L) 240 - 430 ug/dL Final   04/05/2022 255 240 - 430 ug/dL Final       In summary  Small change in sinemet  Urology consultation for possible trial of mirabegron  Return in 6months  Ipatropium for drooling      Future Appointments 10/14/2024 - 4/12/2025        Date Visit Type Length Department Provider     12/10/2024 11:00 AM LAB PERIPHERAL 15 min [facility] LAB INTAKE [facility] DRAW    Location Instructions:     The [facility] ([facility]) is in a dense urban area with multiple transportation and parking options. You may wish to review options for  service and self-parking in more detail on the [facility] s website at [email or web address].   This appointment is in a hospital-based location.&nbsp; Before your visit, you may want to check with your insurance company for coverage and referral options, including cost differences between services provided in different clinic settings.&nbsp; For more information visit this link on the [employer] Website:&nbsp; [email or web address]              12/10/2024 11:30 AM RETURN CCSL 30 min [facility] BMT Augusto Miller MD    Location Instructions:     The  [facility] [employer] ([facility]) is in a dense urban area with multiple transportation and parking options. You may wish to review options for  service and self-parking in more detail on the [facility] website at [email or web address].   This appointment is in a hospital-based location.&nbsp; Before your visit, you may want to check with your insurance company for coverage and referral options, including cost differences between services provided in different clinic settings.&nbsp; For more information visit this link on the [employer] Website:&nbsp; [email or web address]              1/3/2025 11:00 AM ECHO COMPLETE 60 min [facility] CARDIAC SERVICES [email or web address]              1/6/2025  4:30 PM RETURN CARDIOLOGY 30 min [facility] HEART Bekah Matt MD    Location Instructions:     [facility] has 2 buildings on its campus. Please visit us at [address] [place] [address] and enter the building with the numbers [id number] on it.               1/21/2025 11:30 AM [facility] RETURN 30 min  [facility] NEURO Leslie Clifford, [facility]    Location Instructions:     The [facility] ([facility]) is in a dense urban area with multiple transportation and parking options. You may wish to review options for  service and self-parking in more detail on the [facility] website at [email or web address].   This appointment is in a hospital-based location.&nbsp; Before your visit, you may want to check with your insurance company for coverage and referral options, including cost differences between services provided in different clinic settings.&nbsp; For more information visit this link on the [employer] Website:&nbsp; [email or web address]                       Coding statement:   Medical Decision Making:  #  Chronic progressive medical conditions addressed  - see above --   Review and/or interpretation of unique test or documentation from a provider outside of neurology yes   Independent historian  provided additional details  no I  Prescription drug management and review of potential side effects and/or monitoring for side effects  -- see above ---  Health impacted by social determinants of health  no    I have reviewed the note as documented above.  This accurately captures the substance of my conversation with the patient and total time spent preparing for visit, executing visit and completing visit on the day of the visit:  30 minutes.  The portion of this total time included face to face time     The longitudinal plan of care for Haresh Rock was addressed during this visit. Due to the added complexity in care, I will continue to support Haresh Rock in the subsequent management of this condition(s) and with the ongoing continuity of care of this condition(s).      Ángel Hill MD     ______________________________________    Last visit date and details:             ______________________________________      Patient was asked about 14 Review of systems including changes in vision (dry eyes, double vision), hearing, heart, lungs, musculoskeletal, depression, anxiety, snoring, RBD, insomnia, urinary frequency, urinary urgency, constipation, swallowing problems, hematological, ID, allergies, skin problems: seborrhea, endocrinological: thyroid, diabetes, cholesterol; balance, weight changes, and other neurological problems and these were not significant at this time except for   Patient Active Problem List   Diagnosis     Fqttb-sljaxb-ibav disease, chronic (H)     Polyneuropathy     History of bilateral knee replacement     Bone marrow transplant status (H)     Hyperlipidemia LDL goal <70     Chronic rhinitis     Gallstones without obstruction of gallbladder     Long-term (current) use of anticoagulants [Z79.01]     Type 2 diabetes mellitus with stage 2 chronic kidney disease, without long-term current use of insulin (H)     History of Hodgkin's lymphoma     History of irradiation, presenting hazards to  health     Hereditary hemochromatosis (H24)     Oropharyngeal dysphagia     Personal history of PE (pulmonary embolism)     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)     Thrombocytopenia (H24)     Paroxysmal atrial fibrillation (H)     Benign prostatic hyperplasia with urinary retention     Constipation     Parkinson's disease (H)     Gastrostomy tube in place (H)     Hammertoe, bilateral          Allergies   Allergen Reactions     Seasonal Allergies      Past Surgical History:   Procedure Laterality Date     ANESTHESIA CARDIOVERSION  04/21/2011    Procedure:ANESTHESIA CARDIOVERSION; Surgeon:GENERIC ANESTHESIA PROVIDER; Location:[facility] OR     ARTHROSCOPY KNEE RT/LT      LT     CATARACT IOL, RT/LT  2017     COLONOSCOPY  10/16/2012    Procedure: COLONOSCOPY;  Colonoscopy, screening;  Surgeon: Reg Feliciano MD;  Location: [facility] OR     COLONOSCOPY N/A 04/14/2021    Procedure: COLONOSCOPY;  Surgeon: Reg Holm MD;  Location: [facility] GI     ESOPHAGOSCOPY, GASTROSCOPY, DUODENOSCOPY (EGD), COMBINED N/A 10/07/2020    Procedure: ESOPHAGOGASTRODUODENOSCOPY, WITH BIOPSY;  Surgeon: Raul Sawyer DO;  Location: [facility] OR     H ABLATION FOCAL AFIB  03/05/2013    PVI     H ABLATION FOCAL AFIB  01/01/2018     HC BONE MARROW/STEM TRANSPLANT ALLOGENIC  05/08/2007     INSERT PORT VASCULAR ACCESS       IR GASTROSTOMY TUBE PERCUTANEOUS PLCMNT  10/25/2021     JOINT REPLACEMTN, KNEE RT/LT  03/28/2012    SHAWN     LAPAROSCOPY DIAGNOSTIC (GENERAL) N/A 10/29/2021    Procedure: LAPAROSCOPY, DIAGNOSTIC, TAKEDOWN OF MALPOSITIONED GASTROSTOMY TUBE, INSERTION OF GASTROSTOMY TUBE, SMALL BOWEL RESECTION X1, PRIMARY REPAIR OF SMALL BOWEL ENTEROTOMY, ABDOMINAL WASHOUT, TAP BLOCK;  Surgeon: Leif Finney DO;  Location: [facility] OR     PEG TUBE PLACEMENT  10/25/2021     VASECTOMY  1990     Past Medical History:   Diagnosis Date     Abnormal dopamine scan (DaTSCAN) 2020 11/04/2020    [phone] 11/3/2020   Narrative & Impression    Examination: Nuclear medicine DATscan for Dopamine Receptor Localization.   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/23/2020     Atrial fibrillation (H) 04/2011     Benign prostatic hyperplasia with urinary retention      Cirrhosis (H)      CKD (chronic kidney disease) stage 2, GFR 60-89 ml/min      Diabetes mellitus type II     steroid induced     DJD (degenerative joint disease) of knee     SHAWN, worse on right     DVT (deep venous thrombosis) (H)      ED (erectile dysfunction)      Gallbladder polyp 05/2010     Ysrzk-Snxcla-Dmvv Disease 2007    BMT     Heart failure with preserved ejection fraction, NYHA class I (H)      Hemochromatosis      Hodgkin's disease NOS     5 cycles of ABVD in 2011     HTN (hypertension)      Hyperlipidaemia LDL goal <100      Multiple thyroid nodules     benign      Myeloproliferative disorder (H)     atypical, [facility]     Parkinson disease (H) 09/24/2020     PE (pulmonary embolism) 11/2004     Polyneuropathy      Pressure ulcer      Primary pulmonary hypertension (H)      Severe protein-calorie malnutrition (H24) 11/10/2021     Small bowel obstruction (H) 10/29/2021     Thrombocytopenia (H24) 06/08/2021     Thyroid nodule 05/17/2016     Social History     Socioeconomic History     Marital status:      Spouse name: Angelica     Number of children: 2     Years of education: 21     Highest education level: Not on file   Occupational History     Occupation:      Employer: [employer]     Employer: DISABLED   Tobacco Use     Smoking status: Never     Smokeless tobacco: Never   Substance  and Sexual Activity     Alcohol use: No     Drug use: No     Sexual activity: Not Currently     Partners: Female   Other Topics Concern     Parent/sibling w/ CABG, MI or angioplasty before 65F 55M? No   Social History Narrative     about 50 yrs        Wife has some health issues - back pain - second knee replaced    She had gastric bypass and a fib and bad mitral valve        Son in [place]    Daughter in [facility] with 8 yo son.         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[employer]        Born and raised in [place]              Social [facility]     Financial Resource Strain: Low Risk  (11/28/2023)    Financial Resource Strain      Within the past 12 months, have you or your family members you live with been unable to get utilities (heat, electricity) when it was really needed?: No   Food Insecurity: Low Risk  (11/28/2023)    Food Insecurity      Within the past 12 months, did you worry that your food would run out before you got money to buy more?: No      Within the past 12 months, did the food you bought just not last and you didn t have money to get more?: No   Transportation Needs: Low Risk  (11/28/2023)    Transportation Needs      Within the past 12 months, has lack of transportation kept you from medical appointments, getting your medicines, non-medical meetings or appointments, work, or from getting things that you need?: No   Physical Activity: Not on file   Stress: Not on file   Social Connections: Not on file   Interpersonal Safety: Not on file   Housing Stability: Low Risk  (11/28/2023)    Housing Stability      Do you have housing? : Yes      Are you worried about losing your housing?: No       Drug and lactation database from the [place] National Library of Medicine:  http://toxnet.nlm.nih.gov/cgi-bin/sis/htmlgen?LACT      B/P: Data Unavailable, T: Data Unavailable, P: Data Unavailable, R: Data Unavailable 0 lbs 0 oz  There were no vitals taken for this  visit., There is no height or weight on file to calculate BMI.  Medications and Vitals not listed above were documented in the cart and reviewed by me.     Current Outpatient Medications   Medication Sig Dispense Refill     amoxicillin (AMOXIL) 500 MG capsule Take 2,000 mg by mouth once Take 1 hour prior to dental procedure       carbidopa-levodopa (SINEMET)  MG tablet Take 1 tablet daily at 8-9am, 1.5 tablets at 2pm and 1 tablet at 7-7:30 pm. May take additional half tablet as needed. Max of 4 tablets/day. 360 tablet 3     flecainide (TAMBOCOR) 50 MG tablet Take 1 tablet (50 mg) by mouth 2 times daily. 180 tablet 0     loratadine (CLARITIN REDITABS) 10 MG ODT Take 10 mg by mouth daily       metoprolol tartrate (LOPRESSOR) 25 MG tablet TAKE ONE-HALF (1/2) TABLET (12.5 MG) ORALLY OR BY FEEDING TUBE TWICE A DAY (CRUSH TABLET) 90 tablet 0     NONFORMULARY Abbott osmolyte feedings 2 cans 3/day       omeprazole (PRILOSEC) 20 MG DR capsule TAKE 1 CAPSULE EVERY MORNING 90 capsule 3     PREVIDENT 5000 DRY MOUTH 1.1 % GEL topical gel Apply to affected area daily as needed Or brushes with water  3     rosuvastatin (CRESTOR) 40 MG tablet TAKE 1 TABLET AT BEDTIME 90 tablet 3     tamsulosin (FLOMAX) 0.4 MG capsule Take 1 capsule (0.4 mg) by mouth daily 30 capsule 3     warfarin ANTICOAGULANT (COUMADIN) 2.5 MG tablet Take 2.5 mg Tues, Thurs, Sat and 3.75 mg all other days, or as directed by the Coumadin Clinic 110 tablet 1         Ángel Hill MD      Again, thank you for allowing me to participate in the care of your patient.        Sincerely,        Ángel Hill MD

## 2024-10-14 NOTE — PATIENT INSTRUCTIONS
Medications      9a   2p  3p 730p  9p   Amoxicillin amoxil 500mg dentist             Carbidopa/levodopa Sinemet 25/100 1    1.5   1     Flecainide tambocor 50mg 1       1     Loratadine claritin 10mg  1             Metoprolol tartrate 25mg  1/2        1/2      MVI centrum 1             Nonform feeding tube osmolyte   2    2    2   Omeprazole prilosec 20mg 1             Prevident 5000 dry mouth rare             Rosuvastatin crestor 40mg         1     Senna-docusate 8.6-50 no             Starch-maltodextrin thick-it no             Vit D3 cholecalciferol 50 mcg 2000  ?             Warfarin anticoag coumadin 2.5         rx                            Plan:    He has an apple watch - and monitoring his sleep  He is sleeping at night to a radio and not sure if impacting his apple watch  He sleeps during the day and not sleeping at night.     He ha more speech issues - but will try to do the exercises  He has drooling at night. He is willing to try ipatropium nasal spray once or twice daily for drooling.   Denies pneumonia     Urinates every 1 or 1.5 hours. This is an issue.   He had tried flomax w hich did not help.   He has not been on mirabron/myrbetriq (tier 3/5) or gemtesa/ubegron (not on formulary)  He has not seen urology recently  Had a TURP in the past  Major issue is the urinary frequency.     Cholesterol   Rosuvastatin crestor 40mg  Recent Labs   Lab Test 01/17/24  1428 10/11/22  1553   CHOL 95 92   HDL 33* 39*   LDL 34 40   TRIG 141 66     Flecainide tambocor 50mg twice daily   Metoprolol tartrate 25mg 1/2 twice daily   Blood pressure was 110/68 and heart rate was 71  Has future appointments with cardiology including and Echocardiogram    He has some balance issues and not climbing ladder to change light bulbs.   He has had 3 falls in the past month. Holding onto laundry basket and holding on to rail on stairs and missed a step and fell 5 steps from the bottom and landed on basket and knees and elbows  hit the concrete. He is anticoagulated.   #2 Had a fall when not holding onto the rail.  #3 He had a fall taking out recycling - fell on a pile of boxes and had problems getting up. He is trying to be safer. He carries his feeding back up in the living room and may need to do a separate trip or have his wife help him out. He watches tv while getting his feedings.     Carbidopa/levodopa Sinemet 25/100 he is on 1  - 1.5 - 1   He has noticed some problems in the afternoon around 2 pm   [facility]   He may take 1 to 1.5 in the morning and 1.5 to 2 at 2pm and 1 at night     He is using the osmolyte 3 times per day  Fv was calling today during the visit about his feeding supplies.   His weight has been stable. He is on 2 cans 3/day    Angelica Pranav - wife - bladder surgery and urinary issues - suprapubic catheter changed every month and still has incontinence around her urethra and has a pain pump for back pain. She was able to make a wedding in [place] last week with son James.  Sung stayed home.       James - son - working in [place] and has house in [place]  - working from [employer] and had ended contract and may be working for a [place] company remotely.     Gemma - daughter - lives in  [place] and working with [employer] and immigration policy - she may work as ASL. She has her mood issues.     Sees Hematology every 6 months - followed by Melissa for 17 or 19 years for his lymphoma   He is on coumadin/anticoagulated  Sed Rate   Date Value Ref Range Status   06/08/2021 106 (H) 0 - 20 mm/h Final     Erythrocyte Sedimentation Rate   Date Value Ref Range Status   06/12/2024 71 (H) 0 - 20 mm/hr Final     CRP Inflammation   Date Value Ref Range Status   06/12/2024 11.60 (H) <5.00 mg/L Final     Ferritin was 149 as of 6/12/2024  Ferritin   Date Value Ref Range Status   06/12/2024 149 31 - 409 ng/mL Final   02/15/2021  268 26 - 388 ng/mL Final     Iron   Date Value Ref Range Status   06/12/2024 122 61 - 157 ug/dL Final   02/15/2021 78 35 - 180 ug/dL Final     Iron Binding Cap   Date Value Ref Range Status   02/15/2021 227 (L) 240 - 430 ug/dL Final     Iron Binding Capacity   Date Value Ref Range Status   06/12/2024 231 (L) 240 - 430 ug/dL Final   04/05/2022 255 240 - 430 ug/dL Final       In summary  Small change in sinemet  Urology consultation for possible trial of mirabegron  Return in 6months

## 2024-10-14 NOTE — NURSING NOTE
"Haresh Rokc's goals for this visit include:   Chief Complaint   Patient presents with    Parkinson     1 week ago fell down steps ( 5 stairs) and a few weeks earlier fell going up and hurt right hip       He requests these members of his care team be copied on today's visit information: yes    PCP: Anya Nowak    Referring Provider:  Referred Self, MD  No address on file    /68 (BP Location: Right arm, Patient Position: Sitting, Cuff Size: Adult Regular)   Pulse 71   Ht 1.803 m (5' 11\")   Wt 80.7 kg (178 lb)   BMI 24.83 kg/m      Do you need any medication refills at today's visit? No  JUAN Pritchard, CMA ([facility])      "

## 2024-10-18 ENCOUNTER — DOCUMENTATION ONLY (OUTPATIENT)
Dept: ANTICOAGULATION | Facility: CLINIC | Age: 74
End: 2024-10-18
Payer: COMMERCIAL

## 2024-10-18 ENCOUNTER — MYC MEDICAL ADVICE (OUTPATIENT)
Dept: ANTICOAGULATION | Facility: CLINIC | Age: 74
End: 2024-10-18
Payer: COMMERCIAL

## 2024-10-18 DIAGNOSIS — Z79.01 LONG TERM CURRENT USE OF ANTICOAGULANT THERAPY: Primary | ICD-10-CM

## 2024-10-18 DIAGNOSIS — I48.0 PAROXYSMAL ATRIAL FIBRILLATION (H): ICD-10-CM

## 2024-10-18 DIAGNOSIS — Z86.711 PERSONAL HISTORY OF PE (PULMONARY EMBOLISM): ICD-10-CM

## 2024-10-18 LAB — INR HOME MONITORING: 2 RATIO (ref 2–3)

## 2024-10-18 NOTE — PROGRESS NOTES
ANTICOAGULATION  MANAGEMENT-Home Monitor Managed by Exception    Haresh Rock 74 year old male is on warfarin with therapeutic INR result. (Goal INR 2.0-3.0)    Recent labs: (last 7 days)     10/18/24  1534   INR 2       Previous INR was Therapeutic  Medication, diet, health changes since last INR:chart reviewed; none identified  Contacted within the last 12 weeks by phone on 9/27/24  Last [facility] referral date: 05/24/2024      MINNIE Hutson was NOT contacted regarding therapeutic result today per home monitoring policy manage by exception agreement.   Current warfarin dose is to be continued:     Summary  As of 10/18/2024      Full warfarin instructions:  3.75 mg every Sun, Tue, Thu; 2.5 mg all other days   Next INR check:  10/25/2024             ?   Nina Ventura RN  Anticoagulation Clinic  10/18/2024    _______________________________________________________________________     Anticoagulation Episode Summary       Current INR goal:  2.0-3.0   TTR:  83.8% (1 y)   Target end date:  Indefinite   Send INR reminders to:  [facility]    Indications    Long-term (current) use of anticoagulants [Z79.01] [Z79.01]  Atrial fibrillation (H) (Resolved) [I48.91]  Antiphospholipid antibody syndrome (H) (Resolved) [D68.61]  Personal history of DVT (deep vein thrombosis) (Resolved) [Z86.718]  Atrial fibrillation  unspecified type (H) (Resolved) [I48.91]  Paroxysmal atrial fibrillation (H) [I48.0]  Chronic atrial fibrillation (H) (Resolved) [I48.20]  Personal history of PE (pulmonary embolism) [Z86.711]             Comments:  JESSICA rodas to manage by exception             Anticoagulation Care Providers       Provider Role Specialty Phone number    Anya Nowak MD Referring Family Medicine [phone]    Elizabeth Balderas MD Referring HOSPITALIST [phone]

## 2024-10-25 ENCOUNTER — DOCUMENTATION ONLY (OUTPATIENT)
Dept: ANTICOAGULATION | Facility: CLINIC | Age: 74
End: 2024-10-25
Payer: COMMERCIAL

## 2024-10-25 DIAGNOSIS — Z86.711 PERSONAL HISTORY OF PE (PULMONARY EMBOLISM): ICD-10-CM

## 2024-10-25 DIAGNOSIS — I48.0 PAROXYSMAL ATRIAL FIBRILLATION (H): ICD-10-CM

## 2024-10-25 DIAGNOSIS — Z79.01 LONG TERM CURRENT USE OF ANTICOAGULANT THERAPY: Primary | ICD-10-CM

## 2024-10-25 LAB — INR HOME MONITORING: 2.1 RATIO (ref 2–3)

## 2024-10-25 NOTE — PROGRESS NOTES
ANTICOAGULATION  MANAGEMENT-Home Monitor Managed by Exception    Haresh EMILIE Rock 74 year old male is on warfarin with therapeutic INR result. (Goal INR 2.0-3.0)    Recent labs: (last 7 days)     10/25/24  1653   INR 2.1       Previous INR was Therapeutic  Medication, diet, health changes since last INR:chart reviewed; none identified  Contacted within the last 12 weeks by phone on 9/27/24   Due for next call no later than: 12/26/24.   Last [facility] referral date: 05/24/2024      MINNIE Hutson was NOT contacted regarding therapeutic result today per home monitoring policy manage by exception agreement.   Current warfarin dose is to be continued:     Summary  As of 10/25/2024      Full warfarin instructions:  3.75 mg every Sun, Tue, Thu; 2.5 mg all other days   Next INR check:  11/1/2024             ?   Shanice Dhillon RN  Anticoagulation Clinic  10/25/2024    _______________________________________________________________________     Anticoagulation Episode Summary       Current INR goal:  2.0-3.0   TTR:  83.8% (1 y)   Target end date:  Indefinite   Send INR reminders to:  [facility]    Indications    Long-term (current) use of anticoagulants [Z79.01] [Z79.01]  Atrial fibrillation (H) (Resolved) [I48.91]  Antiphospholipid antibody syndrome (H) (Resolved) [D68.61]  Personal history of DVT (deep vein thrombosis) (Resolved) [Z86.718]  Atrial fibrillation  unspecified type (H) (Resolved) [I48.91]  Paroxysmal atrial fibrillation (H) [I48.0]  Chronic atrial fibrillation (H) (Resolved) [I48.20]  Personal history of PE (pulmonary embolism) [Z86.711]             Comments:  JESSICA rodas to manage by exception             Anticoagulation Care Providers       Provider Role Specialty Phone number    Anya Nowak MD Referring Family Medicine [phone]    Elizabeth Balderas MD Referring HOSPITALIST [phone]

## 2024-10-30 ENCOUNTER — PATIENT OUTREACH (OUTPATIENT)
Dept: CARE COORDINATION | Facility: CLINIC | Age: 74
End: 2024-10-30
Payer: COMMERCIAL

## 2024-11-01 ENCOUNTER — HOME INFUSION (OUTPATIENT)
Dept: HOME HEALTH SERVICES | Facility: HOME HEALTH | Age: 74
End: 2024-11-01
Payer: COMMERCIAL

## 2024-11-01 ENCOUNTER — DOCUMENTATION ONLY (OUTPATIENT)
Dept: ANTICOAGULATION | Facility: CLINIC | Age: 74
End: 2024-11-01
Payer: COMMERCIAL

## 2024-11-01 DIAGNOSIS — Z79.01 LONG TERM CURRENT USE OF ANTICOAGULANT THERAPY: Primary | ICD-10-CM

## 2024-11-01 DIAGNOSIS — G20.A1 PARKINSONS DISEASE (H): ICD-10-CM

## 2024-11-01 DIAGNOSIS — R63.4 ABNORMAL LOSS OF WEIGHT: ICD-10-CM

## 2024-11-01 DIAGNOSIS — I48.0 PAROXYSMAL ATRIAL FIBRILLATION (H): ICD-10-CM

## 2024-11-01 DIAGNOSIS — R13.10 DYSPHAGIA: Primary | ICD-10-CM

## 2024-11-01 DIAGNOSIS — Z86.711 PERSONAL HISTORY OF PE (PULMONARY EMBOLISM): ICD-10-CM

## 2024-11-01 LAB — INR HOME MONITORING: 2.2 RATIO (ref 2–3)

## 2024-11-01 RX ORDER — ELECTROLYTES/DEXTROSE
1422 SOLUTION, ORAL ORAL DAILY
Qty: 42660 ML | Refills: 1 | Status: DISPENSED | OUTPATIENT
Start: 2024-11-01 | End: 2024-12-18

## 2024-11-01 NOTE — PROGRESS NOTES
ANTICOAGULATION  MANAGEMENT-Home Monitor Managed by Exception    Haresh EMILIE Rock 74 year old male is on warfarin with therapeutic INR result. (Goal INR 2.0-3.0)    Recent labs: (last 7 days)     11/01/24  1509   INR 2.2       Previous INR was Therapeutic  Medication, diet, health changes since last INR:chart reviewed; none identified  Contacted within the last 12 weeks by phone on 9/27/24  Last [facility] referral date: 05/24/2024      MINNIE Hutson was NOT contacted regarding therapeutic result today per home monitoring policy manage by exception agreement.   Current warfarin dose is to be continued:     Summary  As of 11/1/2024      Full warfarin instructions:  3.75 mg every Sun, Tue, Thu; 2.5 mg all other days   Next INR check:  11/8/2024             ?   Mayi Ayala RN  Anticoagulation Clinic  11/1/2024    _______________________________________________________________________     Anticoagulation Episode Summary       Current INR goal:  2.0-3.0   TTR:  83.8% (1 y)   Target end date:  Indefinite   Send INR reminders to:  [facility]    Indications    Long-term (current) use of anticoagulants [Z79.01] [Z79.01]  Atrial fibrillation (H) (Resolved) [I48.91]  Antiphospholipid antibody syndrome (H) (Resolved) [D68.61]  Personal history of DVT (deep vein thrombosis) (Resolved) [Z86.718]  Atrial fibrillation  unspecified type (H) (Resolved) [I48.91]  Paroxysmal atrial fibrillation (H) [I48.0]  Chronic atrial fibrillation (H) (Resolved) [I48.20]  Personal history of PE (pulmonary embolism) [Z86.711]             Comments:  JESSICA rodas to manage by exception             Anticoagulation Care Providers       Provider Role Specialty Phone number    Anya Nowak MD Referring Family Medicine [phone]    Elizabeth Balderas MD Referring HOSPITALIST [phone]

## 2024-11-08 ENCOUNTER — DOCUMENTATION ONLY (OUTPATIENT)
Dept: ANTICOAGULATION | Facility: CLINIC | Age: 74
End: 2024-11-08
Payer: COMMERCIAL

## 2024-11-08 DIAGNOSIS — I48.0 PAROXYSMAL ATRIAL FIBRILLATION (H): ICD-10-CM

## 2024-11-08 DIAGNOSIS — Z86.711 PERSONAL HISTORY OF PE (PULMONARY EMBOLISM): ICD-10-CM

## 2024-11-08 DIAGNOSIS — Z79.01 LONG TERM CURRENT USE OF ANTICOAGULANT THERAPY: Primary | ICD-10-CM

## 2024-11-08 LAB — INR HOME MONITORING: 2 RATIO (ref 2–3)

## 2024-11-08 NOTE — PROGRESS NOTES
ANTICOAGULATION  MANAGEMENT-Home Monitor Managed by Exception    Haresh Rock 74 year old male is on warfarin with therapeutic INR result. (Goal INR 2.0-3.0)    Recent labs: (last 7 days)     11/08/24  1417   INR 2       Previous INR was Therapeutic  Medication, diet, health changes since last INR:chart reviewed; none identified  Contacted within the last 12 weeks by phone on 9/27/24  Last [facility] referral date: 05/24/2024      MINNIE Hutson was NOT contacted regarding therapeutic result today per home monitoring policy manage by exception agreement.   Current warfarin dose is to be continued:     Summary  As of 11/8/2024      Full warfarin instructions:  3.75 mg every Sun, Tue, Thu; 2.5 mg all other days   Next INR check:  11/15/2024             ?   Allyn Doe RN  Anticoagulation Clinic  11/8/2024    _______________________________________________________________________     Anticoagulation Episode Summary       Current INR goal:  2.0-3.0   TTR:  83.8% (1 y)   Target end date:  Indefinite   Send INR reminders to:  [facility]    Indications    Long-term (current) use of anticoagulants [Z79.01] [Z79.01]  Atrial fibrillation (H) (Resolved) [I48.91]  Antiphospholipid antibody syndrome (H) (Resolved) [D68.61]  Personal history of DVT (deep vein thrombosis) (Resolved) [Z86.718]  Atrial fibrillation  unspecified type (H) (Resolved) [I48.91]  Paroxysmal atrial fibrillation (H) [I48.0]  Chronic atrial fibrillation (H) (Resolved) [I48.20]  Personal history of PE (pulmonary embolism) [Z86.711]             Comments:  JESSICA rodas to manage by exception             Anticoagulation Care Providers       Provider Role Specialty Phone number    Anya Nowak MD Referring Family Medicine [phone]    Elizabeth Balderas MD Referring HOSPITALIST [phone]

## 2024-11-13 ENCOUNTER — HOME INFUSION BILLING (OUTPATIENT)
Dept: HOME HEALTH SERVICES | Facility: HOME HEALTH | Age: 74
End: 2024-11-13
Payer: COMMERCIAL

## 2024-11-13 PROCEDURE — B4152 EF CALORIE DENSE>/=1.5KCAL: HCPCS

## 2024-11-15 ENCOUNTER — DOCUMENTATION ONLY (OUTPATIENT)
Dept: ANTICOAGULATION | Facility: CLINIC | Age: 74
End: 2024-11-15
Payer: COMMERCIAL

## 2024-11-15 DIAGNOSIS — I48.0 PAROXYSMAL ATRIAL FIBRILLATION (H): ICD-10-CM

## 2024-11-15 DIAGNOSIS — Z86.711 PERSONAL HISTORY OF PE (PULMONARY EMBOLISM): ICD-10-CM

## 2024-11-15 DIAGNOSIS — Z79.01 LONG TERM CURRENT USE OF ANTICOAGULANT THERAPY: Primary | ICD-10-CM

## 2024-11-15 LAB — INR HOME MONITORING: 2 RATIO (ref 2–3)

## 2024-11-15 NOTE — PROGRESS NOTES
ANTICOAGULATION  MANAGEMENT-Home Monitor Managed by Exception    Haresh EMILIE Rock 74 year old male is on warfarin with therapeutic INR result. (Goal INR 2.0-3.0)    Recent labs: (last 7 days)     11/15/24  1517   INR 2       Previous INR was Therapeutic  Medication, diet, health changes since last INR:chart reviewed; none identified  Contacted within the last 12 weeks by phone on 9/27/24  Due for next call no later than: 12/26/24.   Last [facility] referral date: 05/24/2024      MINNIE Hutson was NOT contacted regarding therapeutic result today per home monitoring policy manage by exception agreement.   Current warfarin dose is to be continued:     Summary  As of 11/15/2024      Full warfarin instructions:  3.75 mg every Sun, Tue, Thu; 2.5 mg all other days   Next INR check:  11/22/2024             ?   Shanice Dhillon RN  Anticoagulation Clinic  11/15/2024    _______________________________________________________________________     Anticoagulation Episode Summary       Current INR goal:  2.0-3.0   TTR:  83.8% (1 y)   Target end date:  Indefinite   Send INR reminders to:  [facility]    Indications    Long-term (current) use of anticoagulants [Z79.01] [Z79.01]  Atrial fibrillation (H) (Resolved) [I48.91]  Antiphospholipid antibody syndrome (H) (Resolved) [D68.61]  Personal history of DVT (deep vein thrombosis) (Resolved) [Z86.718]  Atrial fibrillation  unspecified type (H) (Resolved) [I48.91]  Paroxysmal atrial fibrillation (H) [I48.0]  Chronic atrial fibrillation (H) (Resolved) [I48.20]  Personal history of PE (pulmonary embolism) [Z86.711]             Comments:  JESSICA rodas to manage by exception             Anticoagulation Care Providers       Provider Role Specialty Phone number    Anya Nowak MD Referring Family Medicine [phone]    Elizabeth Balderas MD Referring HOSPITALIST [phone]

## 2024-11-18 PROCEDURE — B4036 ENTERAL FEED SUP KIT GRAV BY: HCPCS

## 2024-11-19 PROCEDURE — B4036 ENTERAL FEED SUP KIT GRAV BY: HCPCS

## 2024-11-20 PROCEDURE — B4036 ENTERAL FEED SUP KIT GRAV BY: HCPCS

## 2024-11-21 PROCEDURE — B4036 ENTERAL FEED SUP KIT GRAV BY: HCPCS

## 2024-11-22 PROCEDURE — B4036 ENTERAL FEED SUP KIT GRAV BY: HCPCS

## 2024-11-23 PROCEDURE — B4036 ENTERAL FEED SUP KIT GRAV BY: HCPCS

## 2024-11-24 PROCEDURE — B4036 ENTERAL FEED SUP KIT GRAV BY: HCPCS

## 2024-11-25 PROCEDURE — B4036 ENTERAL FEED SUP KIT GRAV BY: HCPCS

## 2024-11-26 PROCEDURE — B4036 ENTERAL FEED SUP KIT GRAV BY: HCPCS

## 2024-11-27 PROCEDURE — B4036 ENTERAL FEED SUP KIT GRAV BY: HCPCS

## 2024-11-28 PROCEDURE — B4036 ENTERAL FEED SUP KIT GRAV BY: HCPCS

## 2024-11-29 PROCEDURE — B4036 ENTERAL FEED SUP KIT GRAV BY: HCPCS

## 2024-11-30 PROCEDURE — B4036 ENTERAL FEED SUP KIT GRAV BY: HCPCS

## 2024-12-01 PROCEDURE — B4036 ENTERAL FEED SUP KIT GRAV BY: HCPCS

## 2024-12-02 PROCEDURE — B4036 ENTERAL FEED SUP KIT GRAV BY: HCPCS

## 2024-12-03 ENCOUNTER — PATIENT OUTREACH (OUTPATIENT)
Dept: ONCOLOGY | Facility: CLINIC | Age: 74
End: 2024-12-03
Payer: COMMERCIAL

## 2024-12-03 DIAGNOSIS — G20.A1 PARKINSON'S DISEASE (H): ICD-10-CM

## 2024-12-03 DIAGNOSIS — E83.110 HEREDITARY HEMOCHROMATOSIS (H): Primary | ICD-10-CM

## 2024-12-03 DIAGNOSIS — Z85.71 HISTORY OF HODGKIN'S LYMPHOMA: ICD-10-CM

## 2024-12-03 DIAGNOSIS — E04.1 THYROID NODULE: ICD-10-CM

## 2024-12-03 PROCEDURE — B4036 ENTERAL FEED SUP KIT GRAV BY: HCPCS

## 2024-12-03 NOTE — PROGRESS NOTES
[facility]: Cancer Care                                                                                          CHART REVIEW:    Received message pt calling to request visit next week on 12/10 be virtual instead of in person.  Per discussion with Dr. Geovani Miller, ok for virtual visit and for pt to have labs on Friday, 12/6.  Per Dr. Geovani Miller, pt to have CBC/d/p, CMP, ESR, CRP & LDH.  RN to enter lab orders for Dr. Geovani Miller review and signature.  Clinic coordinator communicated information to pt and adjusted appts.    Signature:  Bonnie Walls RN, OCN

## 2024-12-03 NOTE — PROGRESS NOTES
[facility]: Cancer Care                                                                                          Late entry on 12/3/24:    Briefly saw pt in clinic and completed learning assessment.    Signature:  Bonnie Walls RN, OCN

## 2024-12-04 PROCEDURE — B4036 ENTERAL FEED SUP KIT GRAV BY: HCPCS

## 2024-12-05 PROCEDURE — B4036 ENTERAL FEED SUP KIT GRAV BY: HCPCS

## 2024-12-06 PROCEDURE — B4036 ENTERAL FEED SUP KIT GRAV BY: HCPCS

## 2024-12-07 PROCEDURE — B4036 ENTERAL FEED SUP KIT GRAV BY: HCPCS

## 2024-12-08 PROCEDURE — B4036 ENTERAL FEED SUP KIT GRAV BY: HCPCS

## 2024-12-08 NOTE — PROGRESS NOTES
Virtual Visit Details    Type of service:  Video Visit   Video Start Time: 1150  Video End Time:    Originating Location (pt. Location): home    Distant Location (provider location):  [facility] [place]  Platform used for Video Visit: [facility]    Clinic Note December 10,2024      Haresh returns to the [facility] for history of a JAK2 positive atypical myeloproliferative syndrome, status post nonmyeloablative allo-sib peripheral blood stem cell transplant in 2007, complicated by chronic ibleq-zugrzl-tfvx disease, cirrhosis, hemochromatosis with C282Y homozygosity, pulmonary emboli and DVT as well as paroxysmal atrial fibrillation for which he is on warfarin, Hodgkin disease in 2011 s/p ABVD x 5 cycles, and a history of cardiac arrhythmias with an ablation. He was  diagnosed by Dr. Ángel Hill with Parkinson disease, with weight loss, dysphagia with aspiration, requiring G tube placement, orthostasis, urinary obstruction. Most recent CT and PET 2/26/21 were without evidence for recurrence of malignancy. Hypermetabolic activity at the anorectal junction with diffuse wall thickening was worked up by 3/17/21 flex sigmoidoscopy which showed a 4 mm polyp (removed, tubular adenoma), and erythematous mucosa that was biopsied and found superficial vascular congestion and melanosis coli without evidence for GVHD. Had full colonoscopy 4/14/21 without other findings. He had TURP with Dr. Hawley for urinary obstruction 8/9/21, as medical therapy was causing too much orthostasis.     INTERVAL HISTORY  Haresh has had 2 falls recently due to Parkinsons. No fracture.No bruise.Xrays were done No significant head injury Continues on formula feedings. He continues to drink about 1 cup of Mountain Dew soda each day to help with feelings of dryness and cough.     He continues to experience polyuria and nocturia ( 4-6 x/night).   He has not had a phlebotomy for a while for his iron overload. No fever. Weight is stable~ 150 No cough.       PAST  MEDICAL HISTORY:  See my note from 06/2024     SOCIAL HISTORY:  See my note from 06/2024     FAMILY HISTORY:  See my note from  06/2024     REVIEW OF SYSTEMS:    Constitutional: c/o worsening fatigue, has occasional chills as mentioned above. No weight loss or fever  Eyes: no new vision problems  Ears: no new hearing issues  CVS: no chest pain or palpitations  Resp: has SOB at baseline, no new symptoms   GI: no abdominal pain, has occasional loose stools  MSK: Has chronic right hip and knee pain  Skin: no rashes  Neuro: no focal weakness or headaches    There were no vitals taken for this visit.    PHYSICAL EXAMINATION:  By the video, he is an alert gentleman, verbal, in no acute distress.     EYES:  Grossly normal to inspection, no discharge, erythema or icterus.   SKIN:  Visible skin is clear.  No significant rash or abnormal pigmentation.   NEUROLOGIC:  Cranial nerves seem to be intact.  Mentation and speech is appropriate for age.   PSYCHIATRIC:  Mentation appears normal.  He does not seem anxious today.       ASSESSSMENT  1.  Myeloproliferative syndrome/MDS. STEVIE 2 positive  2.  Status post non-myeloablative allo-sib peripheral blood stem cell transplant 2007  3.  History of chronic kngop-qbegpd-rjnu disease.   4.  History of Hodgkin's disease 2011 s/p ABVD x 5 cycles.   5.  History of cardiac arrhythmias, SVT and V tach.   6.  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   7.  History of cirrhosis.   8.  History of pulmonary emboli.   9.  History of elevated hemoglobin A1c.   10. Thyroid nodule.    11. Cholelithiasis.     12. Diverticulosis.   13. Anticoagulation with warfarin for history of DVT, PE, and paroxysmal atrial fibrillation  14. Status post bilateral knee replacement.     15. Aortic stenosis  16. 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  17. Seborrheic keratosis of the back.  18. Weight loss, dysphagia, anorexia related to  Parkinson's  19. Parkinson's  20. 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 Chronic aspiration related to dysphagia  22. Thrombocytopenia  23. GERD  24. Chills  25. Worsening urinary frequency     ASSESSMENT:    Haresh's platelet count  is stable at 88k. s. He has maintained his weight since Oct 2023 (180lbs today).    Continue warfarin, and will adjust with Coumadin clinic I worry about his falls related Parkinsons and to hip pain  Will see urology today for nocturia and polyuria The flomax I gave him for  worsening urinary urgency/frequency did not help I recommend for the patient to follow-up with Urology.    MGUS in past  with Elevated monoclonal peak at 0.2 and elevated kappa/lambda ratio at 2.29.  The last  time I checked in June no monoclonal peak! Will continue to monitor SPEP kappa/lambda light chains, cell counts a  .We will see Haresh again in 6 months with labs.     I spent a total of 30 minutes face to face with Haresh Rock during today's office visit. Over 50% of this time was spent counseling the patient and coordinating the care regarding Hodgkins and BMT and 10 minutes preparing to see the patient and  care coordination      Augusto Miller MD  [phone]     Latest Reference Range & Units 12/06/24 11:23   Sodium 135 - 145 mmol/L 133 (L)   Potassium 3.4 - 5.3 mmol/L 4.6   Chloride 98 - 107 mmol/L 97 (L)   Carbon Dioxide (CO2) 22 - 29 mmol/L 27   Urea Nitrogen 8.0 - 23.0 mg/dL 32.9 (H)   Creatinine 0.67 - 1.17 mg/dL 1.02   GFR Estimate >60 mL/min/1.73m2 76   Calcium 8.8 - 10.4 mg/dL 8.9   Anion Gap 7 - 15 mmol/L 9   Albumin 3.5 - 5.2 g/dL 3.7   Protein Total 6.4 - 8.3 g/dL 7.3   Alkaline Phosphatase 40 - 150 U/L 112   ALT 0 - 70 U/L 7   AST 0 - 45 U/L 29   Bilirubin Total <=1.2 mg/dL 0.7   CRP Inflammation <5.00 mg/L 3.45   Glucose 70 - 99 mg/dL 253 (H)   Lactate Dehydrogenase 0 - 250 U/L 188   WBC 4.0 - 11.0 10e3/uL 8.9   Hemoglobin 13.3 - 17.7 g/dL 16.1   Hematocrit 40.0 - 53.0 % 46.4    Platelet Count 150 - 450 10e3/uL 88 (L)   RBC Count 4.40 - 5.90 10e6/uL 4.68   MCV 78 - 100 fL 99   MCH 26.5 - 33.0 pg 34.4 (H)   MCHC 31.5 - 36.5 g/dL 34.7   RDW 10.0 - 15.0 % 11.9   % Neutrophils % 65   % Lymphocytes % 28   % Monocytes % 7   % Eosinophils % 1   % Basophils % 0   Absolute Basophils 0.0 - 0.2 10e3/uL 0.0   Absolute Eosinophils 0.0 - 0.7 10e3/uL 0.1   Absolute Immature Granulocytes <=0.4 10e3/uL 0.0   Absolute Lymphocytes 0.8 - 5.3 10e3/uL 2.5   Absolute Monocytes 0.0 - 1.3 10e3/uL 0.6   % Immature Granulocytes % 0   Absolute Neutrophils 1.6 - 8.3 10e3/uL 5.8   Absolute NRBCs 10e3/uL 0.0   NRBCs per 100 WBC <1 /100 0   Sed Rate 0 - 20 mm/hr 62 (H)   INR Point of Care 0.9 - 1.1  2.3 (H)   (L): Data is abnormally low  (H): Data is abnormally high

## 2024-12-09 PROCEDURE — B4036 ENTERAL FEED SUP KIT GRAV BY: HCPCS

## 2024-12-10 ENCOUNTER — OFFICE VISIT (OUTPATIENT)
Dept: UROLOGY | Facility: CLINIC | Age: 74
End: 2024-12-10
Payer: COMMERCIAL

## 2024-12-10 ENCOUNTER — VIRTUAL VISIT (OUTPATIENT)
Dept: TRANSPLANT | Facility: CLINIC | Age: 74
End: 2024-12-10
Attending: INTERNAL MEDICINE
Payer: COMMERCIAL

## 2024-12-10 VITALS
OXYGEN SATURATION: 97 % | WEIGHT: 181.2 LBS | BODY MASS INDEX: 25.27 KG/M2 | HEART RATE: 78 BPM | DIASTOLIC BLOOD PRESSURE: 68 MMHG | SYSTOLIC BLOOD PRESSURE: 109 MMHG

## 2024-12-10 DIAGNOSIS — R35.0 URINARY FREQUENCY: ICD-10-CM

## 2024-12-10 DIAGNOSIS — E04.1 THYROID NODULE: ICD-10-CM

## 2024-12-10 DIAGNOSIS — K74.69 OTHER CIRRHOSIS OF LIVER (H): ICD-10-CM

## 2024-12-10 DIAGNOSIS — Z79.01 LONG TERM CURRENT USE OF ANTICOAGULANT THERAPY: ICD-10-CM

## 2024-12-10 DIAGNOSIS — N40.1 BENIGN PROSTATIC HYPERPLASIA WITH URINARY RETENTION: Primary | ICD-10-CM

## 2024-12-10 DIAGNOSIS — D89.811 GRAFT-VERSUS-HOST DISEASE, CHRONIC (H): ICD-10-CM

## 2024-12-10 DIAGNOSIS — Z85.71 HISTORY OF HODGKIN'S LYMPHOMA: ICD-10-CM

## 2024-12-10 DIAGNOSIS — R33.8 BENIGN PROSTATIC HYPERPLASIA WITH URINARY RETENTION: Primary | ICD-10-CM

## 2024-12-10 DIAGNOSIS — G20.B2 PARKINSON'S DISEASE WITH DYSKINESIA AND FLUCTUATING MANIFESTATIONS (H): ICD-10-CM

## 2024-12-10 DIAGNOSIS — K80.70 CALCULUS OF GALLBLADDER AND BILE DUCT WITHOUT CHOLECYSTITIS OR OBSTRUCTION: ICD-10-CM

## 2024-12-10 DIAGNOSIS — Z94.81 BONE MARROW TRANSPLANT STATUS (H): ICD-10-CM

## 2024-12-10 DIAGNOSIS — E83.110 HEREDITARY HEMOCHROMATOSIS (H): ICD-10-CM

## 2024-12-10 PROCEDURE — 99213 OFFICE O/P EST LOW 20 MIN: CPT | Mod: 95 | Performed by: INTERNAL MEDICINE

## 2024-12-10 PROCEDURE — 51798 US URINE CAPACITY MEASURE: CPT | Performed by: UROLOGY

## 2024-12-10 PROCEDURE — B4036 ENTERAL FEED SUP KIT GRAV BY: HCPCS

## 2024-12-10 PROCEDURE — 99213 OFFICE O/P EST LOW 20 MIN: CPT | Mod: 25 | Performed by: UROLOGY

## 2024-12-10 PROCEDURE — 81003 URINALYSIS AUTO W/O SCOPE: CPT | Mod: QW | Performed by: UROLOGY

## 2024-12-10 RX ORDER — TROSPIUM CHLORIDE 20 MG/1
20 TABLET, FILM COATED ORAL
Qty: 60 TABLET | Refills: 1 | Status: SHIPPED | OUTPATIENT
Start: 2024-12-10

## 2024-12-10 NOTE — PROGRESS NOTES
1 SODA  Total 60-80 OZ water daily through stomach tube    Bladder scan performed 6ml detected in bladder. Mayi George, CMA

## 2024-12-10 NOTE — LETTER
12/10/2024      Haresh Rock  [address] [place] [address]      Dear Colleague,    Thank you for referring your patient, Haresh Rock, to the [facility] [place]. Please see a copy of my visit note below.    Virtual Visit Details    Type of service:  Video Visit   Video Start Time: 1150  Video End Time:    Originating Location (pt. Location): home    Distant Location (provider location):  [facility] [place]  Platform used for Video Visit: [facility]    Clinic Note December 10,2024      Haresh returns to the [facility] for history of a JAK2 positive atypical myeloproliferative syndrome, status post nonmyeloablative allo-sib peripheral blood stem cell transplant in 2007, complicated by chronic xglfz-edklxl-xmeb disease, cirrhosis, hemochromatosis with C282Y homozygosity, pulmonary emboli and DVT as well as paroxysmal atrial fibrillation for which he is on warfarin, Hodgkin disease in 2011 s/p ABVD x 5 cycles, and a history of cardiac arrhythmias with an ablation. He was  diagnosed by Dr. Ángel Hill with Parkinson disease, with weight loss, dysphagia with aspiration, requiring G tube placement, orthostasis, urinary obstruction. Most recent CT and PET 2/26/21 were without evidence for recurrence of malignancy. Hypermetabolic activity at the anorectal junction with diffuse wall thickening was worked up by 3/17/21 flex sigmoidoscopy which showed a 4 mm polyp (removed, tubular adenoma), and erythematous mucosa that was biopsied and found superficial vascular congestion and melanosis coli without evidence for GVHD. Had full colonoscopy 4/14/21 without other findings. He had TURP with Dr. Hawley for urinary obstruction 8/9/21, as medical therapy was causing too much orthostasis.     INTERVAL HISTORY  Haresh has had 2 falls recently due to Parkinsons. No fracture.No bruise.Xrays were done No significant head injury Continues on formula feedings. He continues to drink about 1  cup of Mountain Dew soda each day to help with feelings of dryness and cough.     He continues to experience polyuria and nocturia ( 4-6 x/night).   He has not had a phlebotomy for a while for his iron overload. No fever. Weight is stable~ 150 No cough.       PAST MEDICAL HISTORY:  See my note from 06/2024     SOCIAL HISTORY:  See my note from 06/2024     FAMILY HISTORY:  See my note from  06/2024     REVIEW OF SYSTEMS:    Constitutional: c/o worsening fatigue, has occasional chills as mentioned above. No weight loss or fever  Eyes: no new vision problems  Ears: no new hearing issues  CVS: no chest pain or palpitations  Resp: has SOB at baseline, no new symptoms   GI: no abdominal pain, has occasional loose stools  MSK: Has chronic right hip and knee pain  Skin: no rashes  Neuro: no focal weakness or headaches    There were no vitals taken for this visit.    PHYSICAL EXAMINATION:  By the video, he is an alert gentleman, verbal, in no acute distress.     EYES:  Grossly normal to inspection, no discharge, erythema or icterus.   SKIN:  Visible skin is clear.  No significant rash or abnormal pigmentation.   NEUROLOGIC:  Cranial nerves seem to be intact.  Mentation and speech is appropriate for age.   PSYCHIATRIC:  Mentation appears normal.  He does not seem anxious today.       ASSESSSMENT  1.  Myeloproliferative syndrome/MDS. STEVIE 2 positive  2.  Status post non-myeloablative allo-sib peripheral blood stem cell transplant 2007  3.  History of chronic qninf-tzbykb-wtrg disease.   4.  History of Hodgkin's disease 2011 s/p ABVD x 5 cycles.   5.  History of cardiac arrhythmias, SVT and V tach.   6.  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   7.  History of cirrhosis.   8.  History of pulmonary emboli.   9.  History of elevated hemoglobin A1c.   10. Thyroid nodule.    11. Cholelithiasis.     12. Diverticulosis.   13. Anticoagulation with warfarin for history of DVT, PE, and  paroxysmal atrial fibrillation  14. Status post bilateral knee replacement.     15. Aortic stenosis  16. 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  17. Seborrheic keratosis of the back.  18. Weight loss, dysphagia, anorexia related to Parkinson's  19. Parkinson's  20. 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 Chronic aspiration related to dysphagia  22. Thrombocytopenia  23. GERD  24. Chills  25. Worsening urinary frequency     ASSESSMENT:    Haresh's platelet count  is stable at 88k. s. He has maintained his weight since Oct 2023 (180lbs today).    Continue warfarin, and will adjust with Coumadin clinic I worry about his falls related Parkinsons and to hip pain  Will see urology today for nocturia and polyuria The flomax I gave him for  worsening urinary urgency/frequency did not help I recommend for the patient to follow-up with Urology.    MGUS in past  with Elevated monoclonal peak at 0.2 and elevated kappa/lambda ratio at 2.29.  The last  time I checked in June no monoclonal peak! Will continue to monitor SPEP kappa/lambda light chains, cell counts a  .We will see Haresh again in 6 months with labs.     I spent a total of 30 minutes face to face with Haresh Rock during today's office visit. Over 50% of this time was spent counseling the patient and coordinating the care regarding Hodgkins and BMT and 10 minutes preparing to see the patient and  care coordination      Augusto Miller MD  [phone]     Latest Reference Range & Units 12/06/24 11:23   Sodium 135 - 145 mmol/L 133 (L)   Potassium 3.4 - 5.3 mmol/L 4.6   Chloride 98 - 107 mmol/L 97 (L)   Carbon Dioxide (CO2) 22 - 29 mmol/L 27   Urea Nitrogen 8.0 - 23.0 mg/dL 32.9 (H)   Creatinine 0.67 - 1.17 mg/dL 1.02   GFR Estimate >60 mL/min/1.73m2 76   Calcium 8.8 - 10.4 mg/dL 8.9   Anion Gap 7 - 15 mmol/L 9   Albumin 3.5 - 5.2 g/dL 3.7   Protein Total 6.4 - 8.3 g/dL 7.3   Alkaline Phosphatase 40 - 150 U/L 112   ALT 0 - 70 U/L 7    AST 0 - 45 U/L 29   Bilirubin Total <=1.2 mg/dL 0.7   CRP Inflammation <5.00 mg/L 3.45   Glucose 70 - 99 mg/dL 253 (H)   Lactate Dehydrogenase 0 - 250 U/L 188   WBC 4.0 - 11.0 10e3/uL 8.9   Hemoglobin 13.3 - 17.7 g/dL 16.1   Hematocrit 40.0 - 53.0 % 46.4   Platelet Count 150 - 450 10e3/uL 88 (L)   RBC Count 4.40 - 5.90 10e6/uL 4.68   MCV 78 - 100 fL 99   MCH 26.5 - 33.0 pg 34.4 (H)   MCHC 31.5 - 36.5 g/dL 34.7   RDW 10.0 - 15.0 % 11.9   % Neutrophils % 65   % Lymphocytes % 28   % Monocytes % 7   % Eosinophils % 1   % Basophils % 0   Absolute Basophils 0.0 - 0.2 10e3/uL 0.0   Absolute Eosinophils 0.0 - 0.7 10e3/uL 0.1   Absolute Immature Granulocytes <=0.4 10e3/uL 0.0   Absolute Lymphocytes 0.8 - 5.3 10e3/uL 2.5   Absolute Monocytes 0.0 - 1.3 10e3/uL 0.6   % Immature Granulocytes % 0   Absolute Neutrophils 1.6 - 8.3 10e3/uL 5.8   Absolute NRBCs 10e3/uL 0.0   NRBCs per 100 WBC <1 /100 0   Sed Rate 0 - 20 mm/hr 62 (H)   INR Point of Care 0.9 - 1.1  2.3 (H)   (L): Data is abnormally low  (H): Data is abnormally high      Again, thank you for allowing me to participate in the care of your patient.        Sincerely,        Augusto Miller MD

## 2024-12-10 NOTE — PROGRESS NOTES
Chief Complaint   Patient presents with    Consult       Haresh PHAN Pranav is a 74 year old male who presents today for follow up of   Chief Complaint   Patient presents with    Consult    Patient is a 74 y.o. male with h/o urinary retention s/p TURP several years ago.  He c/o mainly urinary frequency and nocturia.  He feels like he empties his bladder well.  He has no dysuria or hematuria.  Flomax has not helped.    Current Outpatient Medications   Medication Sig Dispense Refill    amoxicillin (AMOXIL) 500 MG capsule Take 2,000 mg by mouth once Take 1 hour prior to dental procedure      carbidopa-levodopa (SINEMET)  MG tablet 1-1.5  tablet daily at 8-9am, 1.5-2 tablets at 2pm and 1 tablet at 7-7:30 pm = 4 to 4.5/day 405 tablet 3    flecainide (TAMBOCOR) 50 MG tablet Take 1 tablet (50 mg) by mouth 2 times daily. 180 tablet 0    ipratropium (ATROVENT) 0.06 % nasal spray 1-2 sprays in mouth once or twice daily for drooling 15 mL 11    loratadine (CLARITIN REDITABS) 10 MG ODT Take 10 mg by mouth daily      metoprolol tartrate (LOPRESSOR) 25 MG tablet TAKE ONE-HALF (1/2) TABLET (12.5 MG) ORALLY OR BY FEEDING TUBE TWICE A DAY (CRUSH TABLET) *Appointment required for further refills* 90 tablet 0    NONFORMULARY Abbott osmolyte feedings 2 cans 3/day      omeprazole (PRILOSEC) 20 MG DR capsule TAKE 1 CAPSULE EVERY MORNING 90 capsule 3    Osmolite 1.5 Ed 237 mL Place 1,422 mLs into G tube daily. Infuse via gravity bag  6 cartons/day  Water flush: 60ml before and after feedings +  120ml 4x/day 91627 mL 1    PREVIDENT 5000 DRY MOUTH 1.1 % GEL topical gel Apply to affected area daily as needed Or brushes with water  3    rosuvastatin (CRESTOR) 40 MG tablet TAKE 1 TABLET AT BEDTIME 90 tablet 3    trospium (SANCTURA) 20 MG tablet Take 1 tablet (20 mg) by mouth 2 times daily (before meals). 60 tablet 1    warfarin ANTICOAGULANT (COUMADIN) 2.5 MG tablet Take 2.5 mg Tues, Thurs, Sat and 3.75 mg all other days, or as directed by  the Coumadin Clinic 110 tablet 1     Allergies   Allergen Reactions    Seasonal Allergies       Past Medical History:   Diagnosis Date    Abnormal dopamine scan (DaTSCAN) 2020 11/04/2020    [phone] 11/3/2020   Narrative & Impression   Examination: Nuclear medicine DATscan for Dopamine Receptor Localization.   Examination: NM Brain Image Tomographic (SPECT) DATscan   Date: 11/3/2020 3:21 PM   Indication: Parkinsonism   Comparison: None   Additional Information: none   Interfering Medications: None   Technique:   The patient initially received 1 ml of Lugol's solution orally prior    Antiphospholipid antibody syndrome (H)     Ravi's, noted negative per testing re-done in 2008 in hematology note 01/13/2009    Articulation disorder 09/23/2020    Atrial fibrillation (H) 04/2011    Benign prostatic hyperplasia with urinary retention     Cirrhosis (H)     CKD (chronic kidney disease) stage 2, GFR 60-89 ml/min     Diabetes mellitus type II     steroid induced    DJD (degenerative joint disease) of knee     SHAWN, worse on right    DVT (deep venous thrombosis) (H)     ED (erectile dysfunction)     Gallbladder polyp 05/2010    Jpgqw-Xezjfv-Uypf Disease 2007    BMT    Heart failure with preserved ejection fraction, NYHA class I (H)     Hemochromatosis     Hodgkin's disease NOS     5 cycles of ABVD in 2011    HTN (hypertension)     Hyperlipidaemia LDL goal <100     Multiple thyroid nodules     benign     Myeloproliferative disorder (H)     atypical, [facility]    Parkinson disease (H) 09/24/2020    PE (pulmonary embolism) 11/2004    Polyneuropathy     Pressure ulcer     Primary pulmonary hypertension (H)     Severe protein-calorie malnutrition (H) 11/10/2021    Small bowel obstruction (H) 10/29/2021    Thrombocytopenia (H) 06/08/2021    Thyroid nodule 05/17/2016     Past Surgical History:   Procedure Laterality Date    ANESTHESIA CARDIOVERSION  04/21/2011    Procedure:ANESTHESIA CARDIOVERSION; Surgeon:GENERIC ANESTHESIA  PROVIDER; Location:[facility] OR    ARTHROSCOPY KNEE RT/LT      LT    CATARACT IOL, RT/LT  2017    COLONOSCOPY  10/16/2012    Procedure: COLONOSCOPY;  Colonoscopy, screening;  Surgeon: Reg Feliciano MD;  Location: [facility] OR    COLONOSCOPY N/A 04/14/2021    Procedure: COLONOSCOPY;  Surgeon: Reg Holm MD;  Location: [facility] GI    ESOPHAGOSCOPY, GASTROSCOPY, DUODENOSCOPY (EGD), COMBINED N/A 10/07/2020    Procedure: ESOPHAGOGASTRODUODENOSCOPY, WITH BIOPSY;  Surgeon: Raul Sawyer DO;  Location: [facility] OR    H ABLATION FOCAL AFIB  03/05/2013    PVI    H ABLATION FOCAL AFIB  01/01/2018    HC BONE MARROW/STEM TRANSPLANT ALLOGENIC  05/08/2007    INSERT PORT VASCULAR ACCESS      IR GASTROSTOMY TUBE PERCUTANEOUS PLCMNT  10/25/2021    JOINT REPLACEMTN, KNEE RT/LT  03/28/2012    SHAWN    LAPAROSCOPY DIAGNOSTIC (GENERAL) N/A 10/29/2021    Procedure: LAPAROSCOPY, DIAGNOSTIC, TAKEDOWN OF MALPOSITIONED GASTROSTOMY TUBE, INSERTION OF GASTROSTOMY TUBE, SMALL BOWEL RESECTION X1, PRIMARY REPAIR OF SMALL BOWEL ENTEROTOMY, ABDOMINAL WASHOUT, TAP BLOCK;  Surgeon: Leif Finney DO;  Location: [facility] OR    PEG TUBE PLACEMENT  10/25/2021    VASECTOMY  1990     Family History   Problem Relation Age of Onset    Hypertension Mother     Cerebrovascular Disease Mother     Breast Cancer Mother     Alzheimer Disease Father     Arrhythmia Sister         supraventricular tachycardia    Thyroid Disease Sister     Pneumonia Sister         covid19    Other - See Comments Sister         lives in [place]    Arrhythmia Brother     Prostate Cancer Brother     Other - See Comments Brother         [place]    Cancer Brother         lymphome or bone cancer    Gastrointestinal Disease Maternal Grandmother         colitis     Thyroid Cancer Daughter         had thyroid removed    Bipolar Disorder Daughter     Other - See Comments Daughter         lives in [place] - single with one son 9  yrs    Thyroid Disease Daughter     Other - See Comments  Son         lives in [place]. no kids and not .    Obsessive Compulsive Disorder Son     Depression Son     Eating Disorder Son         eats when depressed    Obesity Son     Diabetes Paternal Aunt     Parkinsonism Other         2 cousins with parkinson    C.A.D. No family hx of      Social History     Socioeconomic History    Marital status:      Spouse name: Angelica    Number of children: 2    Years of education: 21    Highest education level: None   Occupational History    Occupation:      Employer: [employer]     Employer: [employer]   Tobacco Use    Smoking status: Never    Smokeless tobacco: Never   Substance and Sexual Activity    Alcohol use: No    Drug use: No    Sexual activity: Not Currently     Partners: Female   Other Topics Concern    Parent/sibling w/ CABG, MI or angioplasty before 65F 55M? No   Social History Narrative     about 50 yrs        Wife has some health issues - back pain - second knee replaced    She had gastric bypass and a fib and bad mitral valve        Son in [place]    Daughter in hospitals with 8 yo son.         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[facility] [employer]        Born and raised in [place]              Social Drivers of Health     Financial Resource Strain: Low Risk  (11/28/2023)    Financial Resource Strain     Within the past 12 months, have you or your family members you live with been unable to get utilities (heat, electricity) when it was really needed?: No   Food Insecurity: Low Risk  (11/28/2023)    Food Insecurity     Within the past 12 months, did you worry that your food would run out before you got money to buy more?: No     Within the past 12 months, did the food you bought just not last and you didn t have money to get more?: No   Transportation Needs: Low Risk  (11/28/2023)    Transportation Needs     Within the past 12 months, has lack of transportation kept you from medical appointments, getting your  medicines, non-medical meetings or appointments, work, or from getting things that you need?: No   Housing Stability: Low Risk  (11/28/2023)    Housing Stability     Do you have housing? : Yes     Are you worried about losing your housing?: No       REVIEW OF SYSTEMS  =================  C: NEGATIVE for fever, chills, change in weight  I: NEGATIVE for worrisome rashes, moles or lesions  E/M: NEGATIVE for ear, mouth and throat problems  R: NEGATIVE for significant cough or SHORTNESS OF BREATH  CV:  NEGATIVE for chest pain, palpitations or peripheral edema  GI: NEGATIVE for nausea, abdominal pain, heartburn, or change in bowel habits  NEURO: NEGATIVE numbness/weakness  : see HPI  PSYCH: NEGATIVE depression/anxiety  LYmph: no new enlarged lymph nodes  Ortho: no new trauma/movements    Physical Exam:  Blood pressure 109/68, pulse 78, weight 82.2 kg (181 lb 3.2 oz), SpO2 97%.    GENERAL: healthy, alert and no distress  EYES: Eyes grossly normal to inspection, conjunctivae and sclerae normal  RESP: no audible wheeze, cough, or visible cyanosis.  No visible retractions or increased work of breathing.  Able to speak fully in complete sentences.  NEURO: Cranial nerves grossly intact, mentation intact and speech normal  PSYCH: mentation appears normal, affect normal/bright, judgement and insight intact, normal speech and appearance well-groomed    Assessment/Plan:   (N40.1,  R33.8) Benign prostatic hyperplasia with urinary retention  (primary encounter diagnosis)  Comment: PVR < 50 ml  Plan: s/p TURP - no bladder retention    (R35.0) Urinary frequency  Comment:    Plan: check UA.  Trial of trospium.  Patient to call for response in a couple of months.          Please note: Voice recognition software was used in this dictation.  It may therefore contain typographical errors.

## 2024-12-10 NOTE — NURSING NOTE
Current patient location: [address] [place] [id number]    Is the patient currently in the state of [place]? YES    Visit mode:VIDEO    If the visit is dropped, the patient can be reconnected by:VIDEO VISIT: Send to e-mail at: [email or web address]    Will anyone else be joining the visit? NO  (If patient encounters technical issues they should call [phone]    Are changes needed to the allergy or medication list? Pt stated no changes to allergies and Pt stated no med changes    Are refills needed on medications prescribed by this physician? NO    Rooming Documentation:  Questionnaire(s) completed    Reason for visit: RECHECK    Sarina GEORGE

## 2024-12-11 ENCOUNTER — TELEPHONE (OUTPATIENT)
Dept: UROLOGY | Facility: CLINIC | Age: 74
End: 2024-12-11

## 2024-12-11 ENCOUNTER — LAB (OUTPATIENT)
Dept: LAB | Facility: CLINIC | Age: 74
End: 2024-12-11
Payer: COMMERCIAL

## 2024-12-11 LAB
ALBUMIN UR-MCNC: NEGATIVE MG/DL
APPEARANCE UR: CLEAR
BILIRUB UR QL STRIP: NEGATIVE
COLOR UR AUTO: YELLOW
GLUCOSE UR STRIP-MCNC: NEGATIVE MG/DL
HGB UR QL STRIP: NEGATIVE
KETONES UR STRIP-MCNC: NEGATIVE MG/DL
LEUKOCYTE ESTERASE UR QL STRIP: NEGATIVE
NITRATE UR QL: NEGATIVE
PH UR STRIP: 7 [PH] (ref 5–7)
SP GR UR STRIP: 1.01 (ref 1–1.03)
UROBILINOGEN UR STRIP-ACNC: 0.2 E.U./DL

## 2024-12-11 PROCEDURE — B4036 ENTERAL FEED SUP KIT GRAV BY: HCPCS

## 2024-12-12 PROCEDURE — B4036 ENTERAL FEED SUP KIT GRAV BY: HCPCS

## 2024-12-12 NOTE — TELEPHONE ENCOUNTER
We fill the trospium as written to take with meals, it is typically recommended to take on an empty stomach.At counselling patient's spouse requested we clarify if you prefer this be taken with a meal.  Thank you  Araceli Nash [facility]  [address] [place] [address]  KEANU Raman [id number]  [phone]

## 2024-12-13 PROCEDURE — B4036 ENTERAL FEED SUP KIT GRAV BY: HCPCS

## 2024-12-14 PROCEDURE — B4036 ENTERAL FEED SUP KIT GRAV BY: HCPCS

## 2024-12-15 PROCEDURE — B4036 ENTERAL FEED SUP KIT GRAV BY: HCPCS

## 2024-12-16 PROCEDURE — B4036 ENTERAL FEED SUP KIT GRAV BY: HCPCS

## 2024-12-17 PROCEDURE — B4036 ENTERAL FEED SUP KIT GRAV BY: HCPCS

## 2024-12-24 ENCOUNTER — HOME INFUSION BILLING (OUTPATIENT)
Dept: HOME HEALTH SERVICES | Facility: HOME HEALTH | Age: 74
End: 2024-12-24
Payer: COMMERCIAL

## 2024-12-27 ENCOUNTER — ANTICOAGULATION THERAPY VISIT (OUTPATIENT)
Dept: ANTICOAGULATION | Facility: CLINIC | Age: 74
End: 2024-12-27
Payer: COMMERCIAL

## 2024-12-27 DIAGNOSIS — I48.0 PAROXYSMAL ATRIAL FIBRILLATION (H): ICD-10-CM

## 2024-12-27 DIAGNOSIS — Z79.01 LONG TERM CURRENT USE OF ANTICOAGULANT THERAPY: Primary | ICD-10-CM

## 2024-12-27 DIAGNOSIS — Z86.711 PERSONAL HISTORY OF PE (PULMONARY EMBOLISM): ICD-10-CM

## 2024-12-27 NOTE — PROGRESS NOTES
ANTICOAGULATION MANAGEMENT     Haresh Rock 74 year old male is on warfarin with subtherapeutic INR result. (Goal INR 2.0-3.0)    Recent labs: (last 7 days)     12/27/24  1303   INR 1.7*       ASSESSMENT     Source(s): Chart Review  Previous INR was Therapeutic last visit; previously outside of goal range  Medication, diet, health changes since last INR chart reviewed; none identified         PLAN     Unable to reach Haresh mayes 2  today.    Left message to continue same dose of 2.5 mg on 12/27 and 12/28, 3.75 mg on 12/29  this weekend. Request call back for assessment.    Follow up required to confirm warfarin dose taken and assess for changes    Akilah Sharp RN  12/27/2024  Anticoagulation Clinic  [facility] for routing messages: holger TRAN HOME MONITORING  [facility] patient phone line: [phone]

## 2024-12-30 ENCOUNTER — TELEPHONE (OUTPATIENT)
Dept: SURGERY | Facility: CLINIC | Age: 74
End: 2024-12-30
Payer: COMMERCIAL

## 2024-12-30 NOTE — TELEPHONE ENCOUNTER
Left Voicemail (1st Attempt) for the patient to call back and schedule the following:    Appointment type: return   Provider: Dr. Philippe   Return date: next available   Specialty phone number: [phone]  Additional appointment(s) needed:   Additonal Notes:

## 2024-12-30 NOTE — PROGRESS NOTES
ANTICOAGULATION MANAGEMENT     Haresh Rock 74 year old male is on warfarin with subtherapeutic INR result. (Goal INR 2.0-3.0)    Recent labs: (last 7 days)     12/27/24  1303   INR 1.7*       ASSESSMENT     Source(s): Chart Review  Previous INR was Therapeutic last visit; previously outside of goal range  Medication, diet, health changes since last INR chart reviewed; none identified         PLAN     Recommended plan for no diet, medication or health factor changes affecting INR     Dosing Instructions: Continue your current warfarin dose with next INR in 1 week       Summary  As of 12/27/2024      Full warfarin instructions:  3.75 mg every Sun, Tue, Thu; 2.5 mg all other days   Next INR check:  1/3/2025               Detailed voice message left for Haresh with dosing instructions and follow up date.   Sent [employer] message with dosing and follow up instructions    Patient to recheck with home meter    Education provided: Please call back if any changes to your diet, medications or how you've been taking warfarin  Contact [phone] with any changes, questions or concerns.     Plan made per [facility] anticoagulation protocol    Akilah Sharp RN  12/30/2024  Anticoagulation Clinic  [facility] for routing messages: p ANTICOAG HOME MONITORING  [facility] patient phone line: [phone]        _______________________________________________________________________     Anticoagulation Episode Summary       Current INR goal:  2.0-3.0   TTR:  82.1% (1 y)   Target end date:  Indefinite   Send INR reminders to:  ANTICOAG HOME MONITORING    Indications    Long-term (current) use of anticoagulants [Z79.01] [Z79.01]  Atrial fibrillation (H) (Resolved) [I48.91]  Antiphospholipid antibody syndrome (H) (Resolved) [D68.61]  Personal history of DVT (deep vein thrombosis) (Resolved) [Z86.718]  Atrial fibrillation  unspecified type (H) (Resolved) [I48.91]  Paroxysmal atrial fibrillation (H) [I48.0]  Chronic atrial fibrillation (H) (Resolved)  [I48.20]  Personal history of PE (pulmonary embolism) [Z86.711]             Comments:  JESSICA okay to manage by exception             Anticoagulation Care Providers       Provider Role Specialty Phone number    Anya Nowak MD Referring Family Medicine [phone]    Elizabeth Balderas MD Referring HOSPITALIST [phone]

## 2024-12-31 DIAGNOSIS — I47.10 PAROXYSMAL SUPRAVENTRICULAR TACHYCARDIA: ICD-10-CM

## 2024-12-31 DIAGNOSIS — D68.61 ANTIPHOSPHOLIPID ANTIBODY SYNDROME (H): ICD-10-CM

## 2024-12-31 DIAGNOSIS — I48.0 PAROXYSMAL ATRIAL FIBRILLATION (H): ICD-10-CM

## 2025-01-01 ENCOUNTER — EPISODE UPDATE (OUTPATIENT)
Dept: HOME HEALTH SERVICES | Facility: HOME HEALTH | Age: 75
End: 2025-01-01
Payer: COMMERCIAL

## 2025-01-01 NOTE — TELEPHONE ENCOUNTER
flecainide (TAMBOCOR) 50 MG tablet   Last Written Prescription Date:  9/11/2024  Last Fill Quantity: 180,   # refills: 0  Last Office Visit : 10/30/2023  [place]  Future Office visit:  1/6/2025  Routing flecainide (TAMBOCOR) 50 MG tablet refill request to provider for review/approval because: Medication not on the Cardiology refill protocol.

## 2025-01-02 ENCOUNTER — TELEPHONE (OUTPATIENT)
Dept: SURGERY | Facility: CLINIC | Age: 75
End: 2025-01-02
Payer: COMMERCIAL

## 2025-01-02 RX ORDER — WARFARIN SODIUM 2.5 MG/1
TABLET ORAL
Qty: 110 TABLET | Refills: 1 | Status: SHIPPED | OUTPATIENT
Start: 2025-01-02

## 2025-01-02 NOTE — TELEPHONE ENCOUNTER
Left Voicemail (2nd Attempt) for the patient to call back and schedule the following:    Appointment type: Ret Gen Surg  Provider:    Return date: Next Available   Specialty phone number: [phone]  Additional appointment(s) needed: n/a  Additonal Notes: replacement of G-tube

## 2025-01-02 NOTE — TELEPHONE ENCOUNTER
ANTICOAGULATION MANAGEMENT:  Medication Refill    Anticoagulation Summary  As of 12/27/2024      Warfarin maintenance plan:  3.75 mg (2.5 mg x 1.5) every Sun, Tue, Thu; 2.5 mg (2.5 mg x 1) all other days   Next INR check:  1/3/2025   Target end date:  Indefinite    Indications    Long-term (current) use of anticoagulants [Z79.01] [Z79.01]  Atrial fibrillation (H) (Resolved) [I48.91]  Antiphospholipid antibody syndrome (H) (Resolved) [D68.61]  Personal history of DVT (deep vein thrombosis) (Resolved) [Z86.718]  Atrial fibrillation  unspecified type (H) (Resolved) [I48.91]  Paroxysmal atrial fibrillation (H) [I48.0]  Chronic atrial fibrillation (H) (Resolved) [I48.20]  Personal history of PE (pulmonary embolism) [Z86.711]                 Anticoagulation Care Providers       Provider Role Specialty Phone number    Anya Nowak MD Referring Family Medicine [phone]    Elizabeth Balderas MD Referring HOSPITALIST [phone]            Refill Criteria    Visit with referring provider/group: Meets criteria: visit within referring provider group in the last 15 months on 11/29/23    [facility] referral last signed: 05/24/2024; within last year:  Yes    Lab monitoring not exceeding 2 weeks overdue: Yes    Haresh meets all criteria for refill. Rx instructions and quantity supplied updated to match patient's current dosing plan. Warfarin 90 day supply with 1 refill granted per [facility] protocol     Christa Alfonso RN  Anticoagulation Clinic

## 2025-01-03 ENCOUNTER — ANTICOAGULATION THERAPY VISIT (OUTPATIENT)
Dept: ANTICOAGULATION | Facility: CLINIC | Age: 75
End: 2025-01-03

## 2025-01-03 ENCOUNTER — ANCILLARY PROCEDURE (OUTPATIENT)
Dept: CARDIOLOGY | Facility: CLINIC | Age: 75
End: 2025-01-03
Attending: INTERNAL MEDICINE
Payer: COMMERCIAL

## 2025-01-03 DIAGNOSIS — Z79.01 LONG TERM CURRENT USE OF ANTICOAGULANT THERAPY: Primary | ICD-10-CM

## 2025-01-03 DIAGNOSIS — Z86.711 PERSONAL HISTORY OF PE (PULMONARY EMBOLISM): ICD-10-CM

## 2025-01-03 DIAGNOSIS — I48.0 PAROXYSMAL ATRIAL FIBRILLATION (H): ICD-10-CM

## 2025-01-03 DIAGNOSIS — I42.8 NONISCHEMIC CARDIOMYOPATHY (H): ICD-10-CM

## 2025-01-03 LAB — LVEF ECHO: NORMAL

## 2025-01-03 PROCEDURE — 93306 TTE W/DOPPLER COMPLETE: CPT | Performed by: INTERNAL MEDICINE

## 2025-01-03 NOTE — PROGRESS NOTES
ANTICOAGULATION MANAGEMENT     Haresh Rock 74 year old male is on warfarin with therapeutic INR result. (Goal INR 2.0-3.0)    Recent labs: (last 7 days)     01/03/25  1655   INR 2.7       ASSESSMENT     Source(s): Chart Review  Previous INR was Subtherapeutic  Medication, diet, health changes since last INR chart reviewed; none identified         PLAN     Unable to reach Haresh today.    Left message to 2.5 mg Fri/Sat and 3.75 mg Sun this weekend. Request call back for assessment.    Follow up required to confirm warfarin dose taken and assess for changes    Mayi Ayala, RN  1/3/2025  Anticoagulation Clinic  [facility] for routing messages: [facility]ger TRAN HOME MONITORING  [facility] patient phone line: [phone]

## 2025-01-05 DIAGNOSIS — I48.0 PAROXYSMAL ATRIAL FIBRILLATION (H): ICD-10-CM

## 2025-01-06 ENCOUNTER — OFFICE VISIT (OUTPATIENT)
Dept: CARDIOLOGY | Facility: CLINIC | Age: 75
End: 2025-01-06
Payer: COMMERCIAL

## 2025-01-06 DIAGNOSIS — I48.0 PAROXYSMAL ATRIAL FIBRILLATION (H): Primary | ICD-10-CM

## 2025-01-06 DIAGNOSIS — I35.0 NONRHEUMATIC AORTIC VALVE STENOSIS: ICD-10-CM

## 2025-01-06 RX ORDER — FLECAINIDE ACETATE 50 MG/1
50 TABLET ORAL 2 TIMES DAILY
Qty: 180 TABLET | Refills: 3 | Status: SHIPPED | OUTPATIENT
Start: 2025-01-06

## 2025-01-06 RX ORDER — METOPROLOL TARTRATE 25 MG/1
TABLET, FILM COATED ORAL
Qty: 30 TABLET | Refills: 0 | Status: SHIPPED | OUTPATIENT
Start: 2025-01-06

## 2025-01-06 NOTE — PATIENT INSTRUCTIONS
Thank you for coming to the [facility] @ [facility]; please note the following instructions:    1. Your prescription for flecainide was refilled.    2.  Follow-up in clinic in 1 year with Dr. Matt.  The cardiology team will contact you to schedule when the time gets closer        If you have any questions regarding your visit please contact your care team:     Cardiology  Telephone Number   Tiffany GUZMAN., RN  Vane BELL, RN  Donna VENTURA, RN  Mandi RAMIREZ, PRIMOA  Annabelle NEFF, QASIM CACERES, [place]  Greer BELL, FANNY Bryan., [phone] (option 1)   For scheduling appts:     [phone] (select option 1)       For the Device Clinic (Pacemakers and ICD's)  RN's :  Chelsey Carrera   During business hours: [phone]    *After business hours:  [phone] (select option 4)      Normal test result notifications will be released via [employer] or mailed within 7 business days.  All other test results, will be communicated via telephone once reviewed by your cardiologist.    If you need a medication refill please contact your pharmacy.  Please allow 3 business days for your refill to be completed.    As always, thank you for trusting us with your health care needs!

## 2025-01-06 NOTE — PROGRESS NOTES
ANTICOAGULATION MANAGEMENT     Haresh Rock 74 year old male is on warfarin with therapeutic INR result. (Goal INR 2.0-3.0)    Recent labs: (last 7 days)     01/03/25  1655   INR 2.7       ASSESSMENT     Source(s): Chart Review and Patient/Caregiver Call     Warfarin doses taken: Warfarin taken as instructed  Diet:  patient is taking 5 formulas now instead of 6.  Stopped taking osmolite  Medication/supplement changes: None noted  New illness, injury, or hospitalization: No  Signs or symptoms of bleeding or clotting: No  Previous result: Subtherapeutic  Additional findings: None       PLAN     Recommended plan for no diet, medication or health factor changes affecting INR     Dosing Instructions: Continue your current warfarin dose with next INR in 1 week       Summary  As of 1/3/2025      Full warfarin instructions:  3.75 mg every Sun, Tue, Thu; 2.5 mg all other days   Next INR check:  1/10/2025               Telephone call with Haresh who verbalizes understanding and agrees to plan    Patient to recheck with home meter    Education provided: Please call back if any changes to your diet, medications or how you've been taking warfarin  Resume manage by exception with home monitor. Continue to submit INR results to home monitor company.You will only be called when your result is out of range and at 90 day check in. Please call and notify [facility] if new medication started, dose missed, signs or symptoms of bleeding or clotting, or a surgery/procedure is scheduled. Due for next call no later than: 4/6/25.    Plan made per [facility] anticoagulation protocol    Mayi Ayala RN  1/6/2025  Anticoagulation Clinic  [facility] for routing messages: [employer] TRAN HOME MONITORING  [facility] patient phone line: [phone]        _______________________________________________________________________     Anticoagulation Episode Summary       Current INR goal:  2.0-3.0   TTR:  82.6% (1 y)   Target end date:  Indefinite   Send INR reminders  to:  ANTICOAG HOME MONITORING    Indications    Long-term (current) use of anticoagulants [Z79.01] [Z79.01]  Atrial fibrillation (H) (Resolved) [I48.91]  Antiphospholipid antibody syndrome (H) (Resolved) [D68.61]  Personal history of DVT (deep vein thrombosis) (Resolved) [Z86.718]  Atrial fibrillation  unspecified type (H) (Resolved) [I48.91]  Paroxysmal atrial fibrillation (H) [I48.0]  Chronic atrial fibrillation (H) (Resolved) [I48.20]  Personal history of PE (pulmonary embolism) [Z86.711]             Comments:  JESSICA rodas to manage by exception             Anticoagulation Care Providers       Provider Role Specialty Phone number    Anya Nowak MD Referring Family Medicine [phone]    Elizabeth Balderas MD Referring HOSPITALIST [phone]

## 2025-01-06 NOTE — LETTER
1/6/2025      RE: Haresh Rock  [address] [facility] [address]       Dear Colleague,    Thank you for the opportunity to participate in the care of your patient, Haresh Rock, at the [facility] HEART CLINIC [facility] at [facility]. Please see a copy of my visit note below.    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     Patient's last visit with me was in October 2023.  Patient has been doing well from an atrial fibrillation standpoint.  There is no known recurrent atrial fibrillation episodes.  Patient did not report any symptoms of prolonged palpitations, exertional dyspnea, exertional angina, frequent lightheadedness, presyncope or syncope.    A transthoracic echocardiogram in January 2025 showed ejection fraction of 55 to 60% and mild aortic stenosis.    PAST MEDICAL HISTORY:  Past Medical History:   Diagnosis Date     Abnormal dopamine scan (DaTSCAN) 2020 11/04/2020    [phone] 11/3/2020   Narrative & Impression   Examination: Nuclear medicine DATscan for Dopamine Receptor Localization.   Examination: NM Brain Image Tomographic (SPECT) DATscan   Date: 11/3/2020 3:21 PM   Indication: Parkinsonism   Comparison: None   Additional Information: none   Interfering Medications: None   Technique:   The patient initially received 1 ml of Lugol's solution orally prior     Antiphospholipid antibody syndrome (H)     Ravi's, noted negative per testing re-done in 2008 in hematology note 01/13/2009     Articulation disorder 09/23/2020     Atrial fibrillation (H) 04/2011     Benign prostatic hyperplasia with urinary retention      Cirrhosis (H)      CKD (chronic kidney  disease) stage 2, GFR 60-89 ml/min      Diabetes mellitus type II     steroid induced     DJD (degenerative joint disease) of knee     SHAWN, worse on right     DVT (deep venous thrombosis) (H)      ED (erectile dysfunction)      Gallbladder polyp 05/2010     Jdwyr-Mzvjto-Gdhh Disease 2007    BMT     Heart failure with preserved ejection fraction, NYHA class I (H)      Hemochromatosis      Hodgkin's disease NOS     5 cycles of ABVD in 2011     HTN (hypertension)      Hyperlipidaemia LDL goal <100      Multiple thyroid nodules     benign      Myeloproliferative disorder (H)     atypical, [facility] [place]     Parkinson disease (H) 09/24/2020     PE (pulmonary embolism) 11/2004     Polyneuropathy      Pressure ulcer      Primary pulmonary hypertension (H)      Severe protein-calorie malnutrition (H) 11/10/2021     Small bowel obstruction (H) 10/29/2021     Thrombocytopenia (H) 06/08/2021     Thyroid nodule 05/17/2016       CURRENT MEDICATIONS:  Current Outpatient Medications   Medication Sig Dispense Refill     amoxicillin (AMOXIL) 500 MG capsule Take 2,000 mg by mouth once Take 1 hour prior to dental procedure       carbidopa-levodopa (SINEMET)  MG tablet 1-1.5  tablet daily at 8-9am, 1.5-2 tablets at 2pm and 1 tablet at 7-7:30 pm = 4 to 4.5/day 405 tablet 3     flecainide (TAMBOCOR) 50 MG tablet Take 1 tablet (50 mg) by mouth 2 times daily. 180 tablet 3     ipratropium (ATROVENT) 0.06 % nasal spray 1-2 sprays in mouth once or twice daily for drooling 15 mL 11     loratadine (CLARITIN REDITABS) 10 MG ODT Take 10 mg by mouth daily       metoprolol tartrate (LOPRESSOR) 25 MG tablet TAKE ONE-HALF (1/2) TABLET BY FEEDING TUBE TWICE A DAY, CRUSH TABLET (APPOINTMENT REQUIRED FOR FURTHER REFILLS) 30 tablet 0     NONFORMULARY Abbott osmolyte feedings 2 cans 3/day       omeprazole (PRILOSEC) 20 MG DR capsule TAKE 1 CAPSULE EVERY MORNING 90 capsule 3     Osmolite 1.5 Ed 237 mL Place 1,422 mLs into G tube daily. Infuse via gravity  bag  6 cartons/day  Water flush: 60ml before and after feedings +  120ml 4x/day 00528 mL 11     PREVIDENT 5000 DRY MOUTH 1.1 % GEL topical gel Apply to affected area daily as needed Or brushes with water  3     rosuvastatin (CRESTOR) 40 MG tablet TAKE 1 TABLET AT BEDTIME 90 tablet 3     trospium (SANCTURA) 20 MG tablet Take 1 tablet (20 mg) by mouth 2 times daily (before meals). 60 tablet 1     warfarin ANTICOAGULANT (COUMADIN) 2.5 MG tablet Take 1.5 tablets (3.75 mg) [place], Thu; 1 tab (2.5 mg) all other days or as directed by coumadin clinic 110 tablet 1       PAST SURGICAL HISTORY:  Past Surgical History:   Procedure Laterality Date     ANESTHESIA CARDIOVERSION  04/21/2011    Procedure:ANESTHESIA CARDIOVERSION; Surgeon:GENERIC ANESTHESIA PROVIDER; Location:[facility] OR     ARTHROSCOPY KNEE RT/LT      LT     CATARACT IOL, RT/LT  2017     COLONOSCOPY  10/16/2012    Procedure: COLONOSCOPY;  Colonoscopy, screening;  Surgeon: Reg Feliciano MD;  Location: [facility] OR     COLONOSCOPY N/A 04/14/2021    Procedure: COLONOSCOPY;  Surgeon: Reg Holm MD;  Location: [facility] GI     ESOPHAGOSCOPY, GASTROSCOPY, DUODENOSCOPY (EGD), COMBINED N/A 10/07/2020    Procedure: ESOPHAGOGASTRODUODENOSCOPY, WITH BIOPSY;  Surgeon: Raul Sawyer DO;  Location: [facility] OR     H ABLATION FOCAL AFIB  03/05/2013    PVI     H ABLATION FOCAL AFIB  01/01/2018     HC BONE MARROW/STEM TRANSPLANT ALLOGENIC  05/08/2007     INSERT PORT VASCULAR ACCESS       IR GASTROSTOMY TUBE PERCUTANEOUS PLCMNT  10/25/2021     JOINT REPLACEMTN, KNEE RT/LT  03/28/2012    SHAWN     LAPAROSCOPY DIAGNOSTIC (GENERAL) N/A 10/29/2021    Procedure: LAPAROSCOPY, DIAGNOSTIC, TAKEDOWN OF MALPOSITIONED GASTROSTOMY TUBE, INSERTION OF GASTROSTOMY TUBE, SMALL BOWEL RESECTION X1, PRIMARY REPAIR OF SMALL BOWEL ENTEROTOMY, ABDOMINAL WASHOUT, TAP BLOCK;  Surgeon: Leif Finney DO;  Location: [facility] OR     PEG TUBE PLACEMENT  10/25/2021     VASECTOMY  1990       ALLERGIES:      Allergies   Allergen Reactions     Seasonal Allergies        FAMILY HISTORY:  - Premature coronary artery disease  - Atrial fibrillation  - Sudden cardiac death     SOCIAL HISTORY:  Social History     Tobacco Use     Smoking status: Never     Smokeless tobacco: Never   Substance Use Topics     Alcohol use: No     Drug use: No       ROS:   Constitutional: No fever, chills, or sweats. Weight stable.   ENT: No visual disturbance, ear ache, epistaxis, sore throat.   Cardiovascular: As per HPI.   Respiratory: No cough, hemoptysis.    GI: No nausea, vomiting, hematemesis, melena, or hematochezia.   : No hematuria.   Integument: Negative.   Psychiatric: Negative.   Hematologic:  Easy bruising, no easy bleeding.  Neuro: Negative.   Endocrinology: No significant heat or cold intolerance   Musculoskeletal: No myalgia.    Exam:  There were no vitals taken for this visit.  GENERAL APPEARANCE: healthy, alert and no distress  HEENT: no icterus, no xanthelasmas, normal pupil size and reaction, normal palate, mucosa moist, no central cyanosis  NECK: no adenopathy, no asymmetry, masses, or scars, thyroid normal to palpation and no bruits, JVP not elevated  RESPIRATORY: lungs clear to auscultation - no rales, rhonchi or wheezes, no use of accessory muscles, no retractions, respirations are unlabored, normal respiratory rate  CARDIOVASCULAR: regular rhythm, normal S1 with physiologic split S2, no S3 or S4 and no murmur, click or rub, precordium quiet with normal PMI.  ABDOMEN: soft, non tender, without hepatosplenomegaly, no masses palpable, bowel sounds normal, aorta not enlarged by palpation, no abdominal bruits  EXTREMITIES: peripheral pulses normal, no edema, no bruits  NEURO: alert and oriented to person/place/time, normal speech, gait and affect  VASC: Radial, femoral, dorsalis pedis and posterior tibialis pulses are normal in volumes and symmetric bilaterally. No bruits are heard.  SKIN: no ecchymoses, no  manju    Labs:  CBC RESULTS:   Lab Results   Component Value Date    WBC 8.9 12/06/2024    WBC 8.8 07/05/2021    RBC 4.68 12/06/2024    RBC 4.30 (L) 07/05/2021    HGB 16.1 12/06/2024    HGB 13.8 07/05/2021    HCT 46.4 12/06/2024    HCT 38.9 (L) 07/05/2021    MCV 99 12/06/2024    MCV 91 07/05/2021    MCH 34.4 (H) 12/06/2024    MCH 32.1 07/05/2021    MCHC 34.7 12/06/2024    MCHC 35.5 07/05/2021    RDW 11.9 12/06/2024    RDW 13.0 07/05/2021    PLT 88 (L) 12/06/2024     (L) 07/05/2021       BMP RESULTS:  Lab Results   Component Value Date     (L) 12/06/2024     07/05/2021    POTASSIUM 4.6 12/06/2024    POTASSIUM 4.5 04/05/2022    POTASSIUM 3.3 (L) 07/05/2021    CHLORIDE 97 (L) 12/06/2024    CHLORIDE 100 04/05/2022    CHLORIDE 103 07/05/2021    CO2 27 12/06/2024    CO2 32 04/05/2022    CO2 28 07/05/2021    ANIONGAP 9 12/06/2024    ANIONGAP 4 04/05/2022    ANIONGAP 4 07/05/2021     (H) 12/06/2024     (H) 04/05/2022    GLC 97 07/05/2021    BUN 32.9 (H) 12/06/2024    BUN 35 (H) 04/05/2022    BUN 25 07/05/2021    CR 1.02 12/06/2024    CR 1.15 07/05/2021    GFRESTIMATED 76 12/06/2024    GFRESTIMATED 63 07/05/2021    GFRESTBLACK 73 07/05/2021    GILBERT 8.9 12/06/2024    GILBERT 8.3 (L) 07/05/2021        INR RESULTS:  Lab Results   Component Value Date    INR 2.7 01/03/2025    INR 1.7 (L) 12/27/2024    INR 2.3 12/20/2024    INR 1.9 (L) 12/13/2024    INR 2.4 (A) 11/19/2021    INR 1.9 (A) 07/16/2021    INR 2.7 (A) 07/09/2021    INR 2.87 (H) 07/05/2021    INR 1.8 (A) 07/02/2021       Procedures:      Assessment and Plan:     Paroxysmal atrial fibrillation-well-controlled by flecainide and metoprolol    Mild aortic stenosis     Nonischemic cardiomyopathy-resolved     It is encouraging that patient has not had recurrent atrial fibrillation episodes.  We will continue current medications and see patient again by video visit in 1 year.      We will obtain a transthoracic echocardiogram to evaluate aortic  stenosis in 2 years.     All questions and concerns were addressed and patient is happy with the plan.    CC  Patient Care Team:  Anya Nowak MD as PCP - General (Family Practice)  Augusto Miller MD as MD (Hematology)  Jesusita Mejia PA-C as Physician Assistant  Pino Sharma MD as MD (Internal Medicine)  Fabi Jimenez MD as MD (INTERNAL MEDICINE - ENDOCRINOLOGY, DIABETES & METABOLISM)  Joel Mata MD as MD (Cardiology)  Tina Mejia, KOSTAS as Specialty Care Coordinator (Cardiology)  Joel Mata MD as Assigned Heart and Vascular Provider  Ángel Hill MD as Assigned Neuroscience Provider  Rosalva Samuels MD as MD (Family Medicine)  Joel Mata MD as MD (Clinical Cardiac Electrophysiology)  Leslie Clifford, [facility] as Pharmacist (Pharmacist)  Bonnie Walls RN as Specialty Care Coordinator (Hematology & Oncology)  Reg Cantu MD as MD (Critical Care)  Augusto Miller MD as Assigned Cancer Care Provider  Anya Nowak MD as Assigned PCP  Leslie Clifford, [facility] as Assigned MTM Pharmacist  Tyler Philippe MD as Assigned Surgical Provider  Jose G Hawley MD as MD (Urology)  Siemens, Molly M, RD as [facility] Registered Dietitian (Dietitian)  Anya Nowak MD as Home Infusion Following Provider (Family Medicine)  JOEL MATA        Please do not hesitate to contact me if you have any questions/concerns.     Sincerely,     Joel Mata MD

## 2025-01-07 NOTE — PROGRESS NOTES
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     Patient's last visit with me was in October 2023.  Patient has been doing well from an atrial fibrillation standpoint.  There is no known recurrent atrial fibrillation episodes.  Patient did not report any symptoms of prolonged palpitations, exertional dyspnea, exertional angina, frequent lightheadedness, presyncope or syncope.    A transthoracic echocardiogram in January 2025 showed ejection fraction of 55 to 60% and mild aortic stenosis.    PAST MEDICAL HISTORY:  Past Medical History:   Diagnosis Date    Abnormal dopamine scan (DaTSCAN) 2020 11/04/2020    [phone] 11/3/2020   Narrative & Impression   Examination: Nuclear medicine DATscan for Dopamine Receptor Localization.   Examination: NM Brain Image Tomographic (SPECT) DATscan   Date: 11/3/2020 3:21 PM   Indication: Parkinsonism   Comparison: None   Additional Information: none   Interfering Medications: None   Technique:   The patient initially received 1 ml of Lugol's solution orally prior    Antiphospholipid antibody syndrome (H)     Ravi's, noted negative per testing re-done in 2008 in hematology note 01/13/2009    Articulation disorder 09/23/2020    Atrial fibrillation (H) 04/2011    Benign prostatic hyperplasia with urinary retention     Cirrhosis (H)     CKD (chronic kidney disease) stage 2, GFR 60-89 ml/min     Diabetes mellitus type II     steroid induced    DJD (degenerative joint disease) of knee     SHAWN, worse on right    DVT (deep venous thrombosis) (H)     ED (erectile dysfunction)     Gallbladder polyp 05/2010    Dnjlw-Letool-Pxbw Disease 2007    BMT    Heart failure with preserved ejection fraction, NYHA class  I (H)     Hemochromatosis     Hodgkin's disease NOS     5 cycles of ABVD in 2011    HTN (hypertension)     Hyperlipidaemia LDL goal <100     Multiple thyroid nodules     benign     Myeloproliferative disorder (H)     atypical, [facility]    Parkinson disease (H) 09/24/2020    PE (pulmonary embolism) 11/2004    Polyneuropathy     Pressure ulcer     Primary pulmonary hypertension (H)     Severe protein-calorie malnutrition (H) 11/10/2021    Small bowel obstruction (H) 10/29/2021    Thrombocytopenia (H) 06/08/2021    Thyroid nodule 05/17/2016       CURRENT MEDICATIONS:  Current Outpatient Medications   Medication Sig Dispense Refill    amoxicillin (AMOXIL) 500 MG capsule Take 2,000 mg by mouth once Take 1 hour prior to dental procedure      carbidopa-levodopa (SINEMET)  MG tablet 1-1.5  tablet daily at 8-9am, 1.5-2 tablets at 2pm and 1 tablet at 7-7:30 pm = 4 to 4.5/day 405 tablet 3    flecainide (TAMBOCOR) 50 MG tablet Take 1 tablet (50 mg) by mouth 2 times daily. 180 tablet 3    ipratropium (ATROVENT) 0.06 % nasal spray 1-2 sprays in mouth once or twice daily for drooling 15 mL 11    loratadine (CLARITIN REDITABS) 10 MG ODT Take 10 mg by mouth daily      metoprolol tartrate (LOPRESSOR) 25 MG tablet TAKE ONE-HALF (1/2) TABLET BY FEEDING TUBE TWICE A DAY, CRUSH TABLET (APPOINTMENT REQUIRED FOR FURTHER REFILLS) 30 tablet 0    NONFORMULARY Abbott osmolyte feedings 2 cans 3/day      omeprazole (PRILOSEC) 20 MG DR capsule TAKE 1 CAPSULE EVERY MORNING 90 capsule 3    Osmolite 1.5 Ed 237 mL Place 1,422 mLs into G tube daily. Infuse via gravity bag  6 cartons/day  Water flush: 60ml before and after feedings +  120ml 4x/day 38988 mL 11    PREVIDENT 5000 DRY MOUTH 1.1 % GEL topical gel Apply to affected area daily as needed Or brushes with water  3    rosuvastatin (CRESTOR) 40 MG tablet TAKE 1 TABLET AT BEDTIME 90 tablet 3    trospium (SANCTURA) 20 MG tablet Take 1 tablet (20 mg) by mouth 2 times daily (before meals). 60  tablet 1    warfarin ANTICOAGULANT (COUMADIN) 2.5 MG tablet Take 1.5 tablets (3.75 mg) [place], Thu; 1 tab (2.5 mg) all other days or as directed by coumadin clinic 110 tablet 1       PAST SURGICAL HISTORY:  Past Surgical History:   Procedure Laterality Date    ANESTHESIA CARDIOVERSION  04/21/2011    Procedure:ANESTHESIA CARDIOVERSION; Surgeon:GENERIC ANESTHESIA PROVIDER; [facility]    ARTHROSCOPY KNEE RT/LT      LT    CATARACT IOL, RT/LT  2017    COLONOSCOPY  10/16/2012    Procedure: COLONOSCOPY;  Colonoscopy, screening;  Surgeon: Reg Feliciano MD;  [facility]    COLONOSCOPY N/A 04/14/2021    Procedure: COLONOSCOPY;  Surgeon: Reg Holm MD;  Location: [facility]    ESOPHAGOSCOPY, GASTROSCOPY, DUODENOSCOPY (EGD), COMBINED N/A 10/07/2020    Procedure: ESOPHAGOGASTRODUODENOSCOPY, WITH BIOPSY;  Surgeon: Raul Sawyer DO;  Location: [facility] OR    H ABLATION FOCAL AFIB  03/05/2013    PVI    H ABLATION FOCAL AFIB  01/01/2018    HC BONE MARROW/STEM TRANSPLANT ALLOGENIC  05/08/2007    INSERT PORT VASCULAR ACCESS      IR GASTROSTOMY TUBE PERCUTANEOUS PLCMNT  10/25/2021    JOINT REPLACEMTN, KNEE RT/LT  03/28/2012    SHAWN    LAPAROSCOPY DIAGNOSTIC (GENERAL) N/A 10/29/2021    Procedure: LAPAROSCOPY, DIAGNOSTIC, TAKEDOWN OF MALPOSITIONED GASTROSTOMY TUBE, INSERTION OF GASTROSTOMY TUBE, SMALL BOWEL RESECTION X1, PRIMARY REPAIR OF SMALL BOWEL ENTEROTOMY, ABDOMINAL WASHOUT, TAP BLOCK;  Surgeon: Leif Finney DO;  Location: [facility]    PEG TUBE PLACEMENT  10/25/2021    VASECTOMY  1990       ALLERGIES:     Allergies   Allergen Reactions    Seasonal Allergies        FAMILY HISTORY:  - Premature coronary artery disease  - Atrial fibrillation  - Sudden cardiac death     SOCIAL HISTORY:  Social History     Tobacco Use    Smoking status: Never    Smokeless tobacco: Never   Substance Use Topics    Alcohol use: No    Drug use: No       ROS:   Constitutional: No fever, chills, or sweats. Weight stable.   ENT:  No visual disturbance, ear ache, epistaxis, sore throat.   Cardiovascular: As per HPI.   Respiratory: No cough, hemoptysis.    GI: No nausea, vomiting, hematemesis, melena, or hematochezia.   : No hematuria.   Integument: Negative.   Psychiatric: Negative.   Hematologic:  Easy bruising, no easy bleeding.  Neuro: Negative.   Endocrinology: No significant heat or cold intolerance   Musculoskeletal: No myalgia.    Exam:  There were no vitals taken for this visit.  GENERAL APPEARANCE: healthy, alert and no distress  HEENT: no icterus, no xanthelasmas, normal pupil size and reaction, normal palate, mucosa moist, no central cyanosis  NECK: no adenopathy, no asymmetry, masses, or scars, thyroid normal to palpation and no bruits, JVP not elevated  RESPIRATORY: lungs clear to auscultation - no rales, rhonchi or wheezes, no use of accessory muscles, no retractions, respirations are unlabored, normal respiratory rate  CARDIOVASCULAR: regular rhythm, normal S1 with physiologic split S2, no S3 or S4 and no murmur, click or rub, precordium quiet with normal PMI.  ABDOMEN: soft, non tender, without hepatosplenomegaly, no masses palpable, bowel sounds normal, aorta not enlarged by palpation, no abdominal bruits  EXTREMITIES: peripheral pulses normal, no edema, no bruits  NEURO: alert and oriented to person/place/time, normal speech, gait and affect  VASC: Radial, femoral, dorsalis pedis and posterior tibialis pulses are normal in volumes and symmetric bilaterally. No bruits are heard.  SKIN: no ecchymoses, no rashes    Labs:  CBC RESULTS:   Lab Results   Component Value Date    WBC 8.9 12/06/2024    WBC 8.8 07/05/2021    RBC 4.68 12/06/2024    RBC 4.30 (L) 07/05/2021    HGB 16.1 12/06/2024    HGB 13.8 07/05/2021    HCT 46.4 12/06/2024    HCT 38.9 (L) 07/05/2021    MCV 99 12/06/2024    MCV 91 07/05/2021    MCH 34.4 (H) 12/06/2024    MCH 32.1 07/05/2021    MCHC 34.7 12/06/2024    MCHC 35.5 07/05/2021    RDW 11.9 12/06/2024    RDW  13.0 07/05/2021    PLT 88 (L) 12/06/2024     (L) 07/05/2021       BMP RESULTS:  Lab Results   Component Value Date     (L) 12/06/2024     07/05/2021    POTASSIUM 4.6 12/06/2024    POTASSIUM 4.5 04/05/2022    POTASSIUM 3.3 (L) 07/05/2021    CHLORIDE 97 (L) 12/06/2024    CHLORIDE 100 04/05/2022    CHLORIDE 103 07/05/2021    CO2 27 12/06/2024    CO2 32 04/05/2022    CO2 28 07/05/2021    ANIONGAP 9 12/06/2024    ANIONGAP 4 04/05/2022    ANIONGAP 4 07/05/2021     (H) 12/06/2024     (H) 04/05/2022    GLC 97 07/05/2021    BUN 32.9 (H) 12/06/2024    BUN 35 (H) 04/05/2022    BUN 25 07/05/2021    CR 1.02 12/06/2024    CR 1.15 07/05/2021    GFRESTIMATED 76 12/06/2024    GFRESTIMATED 63 07/05/2021    GFRESTBLACK 73 07/05/2021    GILBERT 8.9 12/06/2024    GILBERT 8.3 (L) 07/05/2021        INR RESULTS:  Lab Results   Component Value Date    INR 2.7 01/03/2025    INR 1.7 (L) 12/27/2024    INR 2.3 12/20/2024    INR 1.9 (L) 12/13/2024    INR 2.4 (A) 11/19/2021    INR 1.9 (A) 07/16/2021    INR 2.7 (A) 07/09/2021    INR 2.87 (H) 07/05/2021    INR 1.8 (A) 07/02/2021       Procedures:      Assessment and Plan:     Paroxysmal atrial fibrillation-well-controlled by flecainide and metoprolol    Mild aortic stenosis     Nonischemic cardiomyopathy-resolved     It is encouraging that patient has not had recurrent atrial fibrillation episodes.  We will continue current medications and see patient again by video visit in 1 year.      We will obtain a transthoracic echocardiogram to evaluate aortic stenosis in 2 years.     All questions and concerns were addressed and patient is happy with the plan.    CC  Patient Care Team:  Anya Nowak MD as PCP - General (Family Practice)  Augusto Miller MD as MD (Hematology)  Jesusita Mejia PA-C as Physician Assistant  Pino Sharma MD as MD (Internal Medicine)  Fabi Jimenez MD as MD (INTERNAL MEDICINE - ENDOCRINOLOGY, DIABETES &  METABOLISM)  Joel Mata MD as MD (Cardiology)  Tina Mejia, RN as Specialty Care Coordinator (Cardiology)  Joel Mata MD as Assigned Heart and Vascular Provider  Ángel Hill MD as Assigned Neuroscience Provider  Rosalva Samuels MD as MD (Family Medicine)  Joel Mata MD as MD (Clinical Cardiac Electrophysiology)  Leslie Clifford, [facility] as Pharmacist (Pharmacist)  Bonnie Walls RN as Specialty Care Coordinator (Hematology & Oncology)  Reg Cantu MD as MD (Critical Care)  Augusto Miller MD as Assigned Cancer Care Provider  Anya Nowak MD as Assigned PCP  Leslie Clifford, [facility] as Assigned MTM Pharmacist  Tyler Philippe MD as Assigned Surgical Provider  Jose G Hawley MD as MD (Urology)  Siemens, Molly M, RD as [facility] Registered Dietitian (Dietitian)  Anya Nowak MD as Home Infusion Following Provider (Family Medicine)  JOEL MATA

## 2025-01-10 SDOH — HEALTH STABILITY: PHYSICAL HEALTH: ON AVERAGE, HOW MANY MINUTES DO YOU ENGAGE IN EXERCISE AT THIS LEVEL?: 20 MIN

## 2025-01-10 SDOH — HEALTH STABILITY: PHYSICAL HEALTH: ON AVERAGE, HOW MANY DAYS PER WEEK DO YOU ENGAGE IN MODERATE TO STRENUOUS EXERCISE (LIKE A BRISK WALK)?: 1 DAY

## 2025-01-10 ASSESSMENT — SOCIAL DETERMINANTS OF HEALTH (SDOH): HOW OFTEN DO YOU GET TOGETHER WITH FRIENDS OR RELATIVES?: ONCE A WEEK

## 2025-01-13 ENCOUNTER — OFFICE VISIT (OUTPATIENT)
Dept: FAMILY MEDICINE | Facility: CLINIC | Age: 75
End: 2025-01-13
Payer: COMMERCIAL

## 2025-01-13 VITALS
HEART RATE: 69 BPM | TEMPERATURE: 97.6 F | BODY MASS INDEX: 25.74 KG/M2 | WEIGHT: 179.8 LBS | DIASTOLIC BLOOD PRESSURE: 67 MMHG | SYSTOLIC BLOOD PRESSURE: 105 MMHG | OXYGEN SATURATION: 97 % | HEIGHT: 70 IN | RESPIRATION RATE: 18 BRPM

## 2025-01-13 DIAGNOSIS — Z23 NEED FOR PROPHYLACTIC VACCINATION AGAINST HEPATITIS A: ICD-10-CM

## 2025-01-13 DIAGNOSIS — N18.2 TYPE 2 DIABETES MELLITUS WITH STAGE 2 CHRONIC KIDNEY DISEASE, WITHOUT LONG-TERM CURRENT USE OF INSULIN (H): ICD-10-CM

## 2025-01-13 DIAGNOSIS — K74.60 CIRRHOSIS OF LIVER NOT DUE TO ALCOHOL (H): ICD-10-CM

## 2025-01-13 DIAGNOSIS — E83.110 HEREDITARY HEMOCHROMATOSIS: ICD-10-CM

## 2025-01-13 DIAGNOSIS — Z93.1 GASTROSTOMY TUBE IN PLACE (H): ICD-10-CM

## 2025-01-13 DIAGNOSIS — D69.6 THROMBOCYTOPENIA: ICD-10-CM

## 2025-01-13 DIAGNOSIS — Z23 NEED FOR SHINGLES VACCINE: ICD-10-CM

## 2025-01-13 DIAGNOSIS — D89.811 GRAFT-VERSUS-HOST DISEASE, CHRONIC (H): ICD-10-CM

## 2025-01-13 DIAGNOSIS — Z85.71 HISTORY OF HODGKIN'S LYMPHOMA: ICD-10-CM

## 2025-01-13 DIAGNOSIS — E78.5 HYPERLIPIDEMIA LDL GOAL <70: ICD-10-CM

## 2025-01-13 DIAGNOSIS — I48.0 PAROXYSMAL ATRIAL FIBRILLATION (H): ICD-10-CM

## 2025-01-13 DIAGNOSIS — Z94.81 BONE MARROW TRANSPLANT STATUS (H): ICD-10-CM

## 2025-01-13 DIAGNOSIS — Z00.00 ENCOUNTER FOR MEDICARE ANNUAL WELLNESS EXAM: Primary | ICD-10-CM

## 2025-01-13 DIAGNOSIS — G20.B1 PARKINSON'S DISEASE WITH DYSKINESIA, UNSPECIFIED WHETHER MANIFESTATIONS FLUCTUATE (H): ICD-10-CM

## 2025-01-13 DIAGNOSIS — E11.22 TYPE 2 DIABETES MELLITUS WITH STAGE 2 CHRONIC KIDNEY DISEASE, WITHOUT LONG-TERM CURRENT USE OF INSULIN (H): ICD-10-CM

## 2025-01-13 PROBLEM — K59.00 CONSTIPATION: Status: RESOLVED | Noted: 2021-07-27 | Resolved: 2025-01-13

## 2025-01-13 LAB
EST. AVERAGE GLUCOSE BLD GHB EST-MCNC: 134 MG/DL
HBA1C MFR BLD: 6.3 % (ref 0–5.6)

## 2025-01-13 PROCEDURE — 36415 COLL VENOUS BLD VENIPUNCTURE: CPT | Performed by: FAMILY MEDICINE

## 2025-01-13 PROCEDURE — G0439 PPPS, SUBSEQ VISIT: HCPCS | Performed by: FAMILY MEDICINE

## 2025-01-13 PROCEDURE — 90480 ADMN SARSCOV2 VAC 1/ONLY CMP: CPT | Performed by: FAMILY MEDICINE

## 2025-01-13 PROCEDURE — 91320 SARSCV2 VAC 30MCG TRS-SUC IM: CPT | Performed by: FAMILY MEDICINE

## 2025-01-13 PROCEDURE — 80061 LIPID PANEL: CPT | Performed by: FAMILY MEDICINE

## 2025-01-13 PROCEDURE — 83036 HEMOGLOBIN GLYCOSYLATED A1C: CPT | Performed by: FAMILY MEDICINE

## 2025-01-13 PROCEDURE — G2211 COMPLEX E/M VISIT ADD ON: HCPCS | Performed by: FAMILY MEDICINE

## 2025-01-13 PROCEDURE — 99214 OFFICE O/P EST MOD 30 MIN: CPT | Mod: 25 | Performed by: FAMILY MEDICINE

## 2025-01-13 RX ORDER — METOPROLOL TARTRATE 25 MG/1
TABLET, FILM COATED ORAL
Qty: 90 TABLET | Refills: 4 | Status: SHIPPED | OUTPATIENT
Start: 2025-01-13

## 2025-01-13 RX ORDER — ROSUVASTATIN CALCIUM 40 MG/1
40 TABLET, COATED ORAL AT BEDTIME
Qty: 90 TABLET | Refills: 4 | Status: SHIPPED | OUTPATIENT
Start: 2025-01-13

## 2025-01-13 NOTE — PROGRESS NOTES
"Preventive Care Visit  [facility] DOUG Nowak MD, Family Medicine  Jan 13, 2025      Assessment & Plan     Encounter for Medicare annual wellness exam    Type 2 diabetes mellitus with stage 2 chronic kidney disease, without long-term current use of insulin (H)  Diet controlled; follow-up yearly   - HEMOGLOBIN A1C; Future  - Lipid panel reflex to direct LDL Non-fasting; Future  - rosuvastatin (CRESTOR) 40 MG tablet; Take 1 tablet (40 mg) by mouth at bedtime.  - OFFICE/OUTPT VISIT,EST,LEVL IV    Hyperlipidemia LDL goal <70  Well controlled with medications without side effects.   - rosuvastatin (CRESTOR) 40 MG tablet; Take 1 tablet (40 mg) by mouth at bedtime.  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 Due to the added complexity in care, I will continue to support Haresh in the subsequent management and with ongoing continuity of care.    BMI  Estimated body mass index is 25.51 kg/m  as calculated from the following:    Height as of this encounter: 1.788 m (5' 10.39\").    Weight as of this encounter: 81.6 kg (179 lb 12.8 oz).       Counseling  Appropriate preventive services were addressed with this patient via screening, questionnaire, or " discussion as appropriate for fall prevention, nutrition, physical activity, Tobacco-use cessation, social engagement, weight loss and cognition.  Checklist reviewing preventive services available has been given to the patient.  Reviewed patient's diet, addressing concerns and/or questions.   He is at risk for lack of exercise and has been provided with information to increase physical activity for the benefit of his well-being.   Discussed possible causes of fatigue. Information on urinary incontinence and treatment options given to patient.       Follow-up yearly     Arsh Hutson is a 74 year old, presenting for the following:  Physical        1/13/2025     1:31 PM   Additional Questions   Roomed by Deborah RAMIREZ CMA   Accompanied by Self         1/13/2025     1:31 PM   Patient Reported Additional Medications   Patient reports taking the following new medications None           HPI  Patient also presents to follow-up diabetes. Diabetic Review of Systems - Medication compliance:  DIET controlled, Diabetic diet compliance:  compliant all of the time, Home glucose monitoring:  blood glucose record WAS NOT brought in today, Diabetic ROS: no polyuria or polydipsia, no chest pain, dyspnea or TIA's, no unusual visual symptoms, weight has increased.  Hypercholesterolemia well controlled with current treatment plan without side effects.     He is followed by neurology for Parkinson's disease complicated by dysphagia and need for G tube.         Health Care Directive  Patient has a Health Care Directive on file  Advance care planning document is on file and is current.      1/10/2025   General Health   How would you rate your overall physical health? (!) FAIR   Feel stress (tense, anxious, or unable to sleep) Only a little   (!) STRESS CONCERN      1/10/2025   Nutrition   Diet: Other   If other, please elaborate: Osmolite formula through G-tube         1/10/2025   Exercise   Days per week of moderate/strenous exercise 1 day    Average minutes spent exercising at this level 20 min   (!) EXERCISE CONCERN      1/10/2025   Social Factors   Frequency of gathering with friends or relatives Once a week   Worry food won't last until get money to buy more No   Food not last or not have enough money for food? No   Do you have housing? (Housing is defined as stable permanent housing and does not include staying ouside in a car, in a tent, in an abandoned building, in an overnight shelter, or couch-surfing.) Yes   Are you worried about losing your housing? No   Lack of transportation? No   Unable to get utilities (heat,electricity)? No         1/13/2025   Fall Risk   Gait Speed Test (Document in seconds) 4.16   Gait Speed Test Interpretation Less than or equal to 5.00 seconds - PASS          1/10/2025   Activities of Daily Living- Home Safety   Needs help with the following daily activites None of the above   Safety concerns in the home None of the above         1/10/2025   Dental   Dentist two times every year? Yes         1/10/2025   Hearing Screening   Hearing concerns? None of the above         1/10/2025   Driving Risk Screening   Patient/family members have concerns about driving No         1/10/2025   General Alertness/Fatigue Screening   Have you been more tired than usual lately? (!) YES         1/10/2025   Urinary Incontinence Screening   Bothered by leaking urine in past 6 months Yes            Today's PHQ-2 Score:       1/13/2025     1:16 PM   PHQ-2 ( 1999 [employer])   Q1: Little interest or pleasure in doing things 0   Q2: Feeling down, depressed or hopeless 0   PHQ-2 Score 0    Q1: Little interest or pleasure in doing things Not at all   Q2: Feeling down, depressed or hopeless Not at all   PHQ-2 Score 0       Patient-reported           1/10/2025   Substance Use   Alcohol more than 3/day or more than 7/wk Not Applicable   Do you have a current opioid prescription? No   How severe/bad is pain from 1 to 10? 1/10   Do you use any other  substances recreationally? No     Social History     Tobacco Use    Smoking status: Never     Passive exposure: Never    Smokeless tobacco: Never   Vaping Use    Vaping status: Never Used   Substance Use Topics    Alcohol use: No    Drug use: No       ASCVD Risk   The ASCVD Risk score (Felisha FLANAGAN, et al., 2019) failed to calculate for the following reasons:    The valid total cholesterol range is 130 to 320 mg/dL            Reviewed and updated as needed this visit by Provider   Tobacco  Allergies  Meds  Problems  Med Hx  Surg Hx  Fam Hx     Sexual Activity          Patient Active Problem List   Diagnosis    Sepfh-sqmvom-zvvd disease, chronic (H)    Polyneuropathy    History of bilateral knee replacement    Bone marrow transplant status (H)    Hyperlipidemia LDL goal <70    Chronic rhinitis    Gallstones without obstruction of gallbladder    Long-term (current) use of anticoagulants [Z79.01]    Type 2 diabetes mellitus with stage 2 chronic kidney disease, without long-term current use of insulin (H)    History of Hodgkin's lymphoma    History of irradiation, presenting hazards to health    Hereditary hemochromatosis    Oropharyngeal dysphagia    Personal history of PE (pulmonary embolism)    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)    Thrombocytopenia    Paroxysmal atrial fibrillation (H)    Benign prostatic hyperplasia with urinary retention    Parkinson's disease (H)    Gastrostomy tube in place (H)    Hammertoe, bilateral     Past Surgical History:   Procedure Laterality Date    ANESTHESIA CARDIOVERSION  04/21/2011    Procedure:ANESTHESIA CARDIOVERSION; Surgeon:GENERIC ANESTHESIA PROVIDER; Location:[facility]    ARTHROSCOPY KNEE RT/LT      LT    CATARACT IOL, RT/LT  2017    COLONOSCOPY  10/16/2012    Procedure: COLONOSCOPY;  Colonoscopy, screening;  Surgeon: Reg Feliciano MD;  Location: [facility]    COLONOSCOPY N/A 04/14/2021    Procedure: COLONOSCOPY;  Surgeon: Reg Holm MD;  Location:  [facility]    ESOPHAGOSCOPY, GASTROSCOPY, DUODENOSCOPY (EGD), COMBINED N/A 10/07/2020    Procedure: ESOPHAGOGASTRODUODENOSCOPY, WITH BIOPSY;  Surgeon: Raul Sawyer DO;  Location: [facility] OR    H ABLATION FOCAL AFIB  03/05/2013    PVI    H ABLATION FOCAL AFIB  01/01/2018    HC BONE MARROW/STEM TRANSPLANT ALLOGENIC  05/08/2007    INSERT PORT VASCULAR ACCESS      IR GASTROSTOMY TUBE PERCUTANEOUS PLCMNT  10/25/2021    JOINT REPLACEMTN, KNEE RT/LT  03/28/2012    SHAWN    LAPAROSCOPY DIAGNOSTIC (GENERAL) N/A 10/29/2021    Procedure: LAPAROSCOPY, DIAGNOSTIC, TAKEDOWN OF MALPOSITIONED GASTROSTOMY TUBE, INSERTION OF GASTROSTOMY TUBE, SMALL BOWEL RESECTION X1, PRIMARY REPAIR OF SMALL BOWEL ENTEROTOMY, ABDOMINAL WASHOUT, TAP BLOCK;  Surgeon: Leif Finney DO;  Location: [facility] OR    PEG TUBE PLACEMENT  10/25/2021    VASECTOMY  1990       Social History     Tobacco Use    Smoking status: Never     Passive exposure: Never    Smokeless tobacco: Never   Substance Use Topics    Alcohol use: No     Family History   Problem Relation Age of Onset    Hypertension Mother     Cerebrovascular Disease Mother     Breast Cancer Mother     Alzheimer Disease Father     Arrhythmia Sister         supraventricular tachycardia    Thyroid Disease Sister     Pneumonia Sister         covid19    Other - See Comments Sister         lives in [place]    Arrhythmia Brother     Prostate Cancer Brother     Other - See Comments Brother         [place]    Cancer Brother         lymphome or bone cancer    Gastrointestinal Disease Maternal Grandmother         colitis     Thyroid Cancer Daughter         had thyroid removed    Bipolar Disorder Daughter     Other - See Comments Daughter         lives in [place] - single with one son 9  yrs    Thyroid Disease Daughter     Other - See Comments Son         lives in [place]. no kids and not .    Obsessive Compulsive Disorder Son     Depression Son     Eating Disorder Son         eats when  depressed    Obesity Son     Diabetes Paternal Aunt     Parkinsonism Other         2 cousins with parkinson    C.A.D. No family hx of          Current providers sharing in care for this patient include:  Patient Care Team:  Anya Nowak MD as PCP - General (Family Practice)  Augusto Miller MD as MD (Hematology)  Bekah Matt MD as MD (Cardiology)  Tina Mejia, RN as Specialty Care Coordinator (Cardiology)  Bekah Matt MD as Assigned Heart and Vascular Provider  Ángel Hill MD as Assigned Neuroscience Provider  Rosalva Samuels MD as MD (Family Medicine)  Bekah Matt MD as MD (Clinical Cardiac Electrophysiology)  Leslie Clifford, [facility] as Pharmacist (Pharmacist)  Bonnie Walls RN as Specialty Care Coordinator (Hematology & Oncology)  Augusto Miller MD as Assigned Cancer Care Provider  Anya Nowak MD as Assigned PCP  Leslie Clifford, [facility] as Assigned MTM Pharmacist  Tyler Philippe MD as Assigned Surgical Provider  Jose G Hawley MD as MD (Urology)  Siemens, Molly M, RD as [facility] Registered Dietitian (Dietitian)  Anya Nowak MD as Home Infusion Following Provider (Family Medicine)    The following health maintenance items are reviewed in Epic and correct as of today:  Health Maintenance   Topic Date Due    DIABETIC FOOT EXAM  10/11/2023    HEPATITIS A IMMUNIZATION (2 of 2 - Risk 2-dose series) 05/29/2024    COVID-19 Vaccine (10 - 2024-25 season) 11/12/2024    MICROALBUMIN  11/29/2024    ZOSTER IMMUNIZATION (2 of 2) 09/26/2024    A1C  01/17/2025    LIPID  01/17/2025    HF ACTION PLAN  11/29/2033 (Originally 1950)    CBC  03/06/2025    EYE EXAM  04/04/2025    BMP  06/06/2025    ALT  12/06/2025    CMP  12/06/2025    MEDICARE ANNUAL WELLNESS VISIT  01/13/2026    ANNUAL REVIEW OF HM ORDERS  01/13/2026    FALL RISK ASSESSMENT  01/13/2026    ADVANCE CARE PLANNING  01/13/2030    DTAP/TDAP/TD IMMUNIZATION (3 - Td or Tdap)  "08/01/2034    TSH W/FREE T4 REFLEX  Completed    HEPATITIS C SCREENING  Completed    PHQ-2 (once per calendar year)  Completed    INFLUENZA VACCINE  Completed    Pneumococcal Vaccine: 50+ Years  Completed    URINALYSIS  Completed    HEPATITIS B IMMUNIZATION  Completed    RSV VACCINE  Completed    HPV IMMUNIZATION  Aged Out    MENINGITIS IMMUNIZATION  Aged Out    RSV MONOCLONAL ANTIBODY  Aged Out    COLORECTAL CANCER SCREENING  Discontinued            Objective    Exam  /67 (BP Location: Right arm, Patient Position: Sitting, Cuff Size: Adult Regular)   Pulse 69   Temp 97.6  F (36.4  C) (Oral)   Resp 18   Ht 1.788 m (5' 10.39\")   Wt 81.6 kg (179 lb 12.8 oz)   SpO2 97%   BMI 25.51 kg/m     Estimated body mass index is 25.51 kg/m  as calculated from the following:    Height as of this encounter: 1.788 m (5' 10.39\").    Weight as of this encounter: 81.6 kg (179 lb 12.8 oz).    Physical Exam  GENERAL: alert and no distress  EYES: Eyes grossly normal to inspection, PERRL and conjunctivae and sclerae normal  NECK: no adenopathy, no asymmetry, masses, or scars  RESP: lungs clear to auscultation - no rales, rhonchi or wheezes  CV: regular rate and rhythm, normal S1 S2, no S3 or S4, no murmur, click or rub, no peripheral edema  MS: no deformity   NEURO: flat faces, soft speech, rigid gait   PSYCH: mentation appears normal and affect flat        1/13/2025   Mini Cog   Clock Draw Score 2 Normal   3 Item Recall 1 object recalled   Mini Cog Total Score 3              Signed Electronically by: Anya Nowak MD    "

## 2025-01-13 NOTE — PATIENT INSTRUCTIONS
Patient Education   Preventive Care Advice   This is general advice given by our system to help you stay healthy. However, your care team may have specific advice just for you. Please talk to your care team about your preventive care needs.  Nutrition  Eat 5 or more servings of fruits and vegetables each day.  Try wheat bread, brown rice and whole grain pasta (instead of white bread, rice, and pasta).  Get enough calcium and vitamin D. Check the label on foods and aim for 100% of the RDA (recommended daily allowance).  Lifestyle  Exercise at least 150 minutes each week  (30 minutes a day, 5 days a week).  Do muscle strengthening activities 2 days a week. These help control your weight and prevent disease.  No smoking.  Wear sunscreen to prevent skin cancer.  Have a dental exam and cleaning every 6 months.  Yearly exams  See your health care team every year to talk about:  Any changes in your health.  Any medicines your care team has prescribed.  Preventive care, family planning, and ways to prevent chronic diseases.  Shots (vaccines)   HPV shots (up to age 26), if you've never had them before.  Hepatitis B shots (up to age 59), if you've never had them before.  COVID-19 shot: Get this shot when it's due.  Flu shot: Get a flu shot every year.  Tetanus shot: Get a tetanus shot every 10 years.  Pneumococcal, hepatitis A, and RSV shots: Ask your care team if you need these based on your risk.  Shingles shot (for age 50 and up)  General health tests  Diabetes screening:  Starting at age 35, Get screened for diabetes at least every 3 years.  If you are younger than age 35, ask your care team if you should be screened for diabetes.  Cholesterol test: At age 39, start having a cholesterol test every 5 years, or more often if advised.  Bone density scan (DEXA): At age 50, ask your care team if you should have this scan for osteoporosis (brittle bones).  Hepatitis C: Get tested at least once in your life.  STIs (sexually  transmitted infections)  Before age 24: Ask your care team if you should be screened for STIs.  After age 24: Get screened for STIs if you're at risk. You are at risk for STIs (including HIV) if:  You are sexually active with more than one person.  You don't use condoms every time.  You or a partner was diagnosed with a sexually transmitted infection.  If you are at risk for HIV, ask about PrEP medicine to prevent HIV.  Get tested for HIV at least once in your life, whether you are at risk for HIV or not.  Cancer screening tests  Cervical cancer screening: If you have a cervix, begin getting regular cervical cancer screening tests starting at age 21.  Breast cancer scan (mammogram): If you've ever had breasts, begin having regular mammograms starting at age 40. This is a scan to check for breast cancer.  Colon cancer screening: It is important to start screening for colon cancer at age 45.  Have a colonoscopy test every 10 years (or more often if you're at risk) Or, ask your provider about stool tests like a FIT test every year or Cologuard test every 3 years.  To learn more about your testing options, visit:   .  For help making a decision, visit:   https://bit.ly/yg55487.  Prostate cancer screening test: If you have a prostate, ask your care team if a prostate cancer screening test (PSA) at age 55 is right for you.  Lung cancer screening: If you are a current or former smoker ages 50 to 80, ask your care team if ongoing lung cancer screenings are right for you.  For informational purposes only. Not to replace the advice of your health care provider. Copyright   2023 [facility]. All rights reserved. Clinically reviewed by the [facility] Transitions Program. [employer] 624920 - REV 01/24.  Preventing Falls: Care Instructions  Injuries and health problems such as trouble walking or poor eyesight can increase your risk of falling. So can some medicines. But there are things you can do to help  "prevent falls. You can exercise to get stronger. You can also arrange your home to make it safer.    Talk to your doctor about the medicines you take. Ask if any of them increase the risk of falls and whether they can be changed or stopped.   Try to exercise regularly. It can help improve your strength and balance. This can help lower your risk of falling.         Practice fall safety and prevention.   Wear low-heeled shoes that fit well and give your feet good support. Talk to your doctor if you have foot problems that make this hard.  Carry a cellphone or wear a medical alert device that you can use to call for help.  Use stepladders instead of chairs to reach high objects. Don't climb if you're at risk for falls. Ask for help, if needed.  Wear the correct eyeglasses, if you need them.        Make your home safer.   Remove rugs, cords, clutter, and furniture from walkways.  Keep your house well lit. Use night-lights in hallways and bathrooms.  Install and use sturdy handrails on stairways.  Wear nonskid footwear, even inside. Don't walk barefoot or in socks without shoes.        Be safe outside.   Use handrails, curb cuts, and ramps whenever possible.  Keep your hands free by using a shoulder bag or backpack.  Try to walk in well-lit areas. Watch out for uneven ground, changes in pavement, and debris.  Be careful in the winter. Walk on the grass or gravel when sidewalks are slippery. Use de-icer on steps and walkways. Add non-slip devices to shoes.    Put grab bars and nonskid mats in your shower or tub and near the toilet. Try to use a shower chair or bath bench when bathing.   Get into a tub or shower by putting in your weaker leg first. Get out with your strong side first. Have a phone or medical alert device in the bathroom with you.   Where can you learn more?  Go to https://www.[employer] Worldwidewise.net/patiented  Enter G117 in the search box to learn more about \"Preventing Falls: Care Instructions.\"  Current as of: " July 31, 2024  Content Version: 14.3    2024 [employer].   Care instructions adapted under license by your healthcare professional. If you have questions about a medical condition or this instruction, always ask your healthcare professional. [employer] disclaims any warranty or liability for your use of this information.    Learning About Sleeping Well  What does sleeping well mean?     Sleeping well means getting enough sleep to feel good and stay healthy. How much sleep is enough varies among people.  The number of hours you sleep and how you feel when you wake up are both important. If you do not feel refreshed, you probably need more sleep. Another sign of not getting enough sleep is feeling tired during the day.  Experts recommend that adults get at least 7 or more hours of sleep per day. Children and older adults need more sleep.  Why is getting enough sleep important?  Getting enough quality sleep is a basic part of good health. When your sleep suffers, your physical health, mood, and your thoughts can suffer too. You may find yourself feeling more grumpy or stressed. Not getting enough sleep also can lead to serious problems, including injury, accidents, anxiety, and depression.  What might cause poor sleeping?  Many things can cause sleep problems, including:  Changes to your sleep schedule.  Stress. Stress can be caused by fear about a single event, such as giving a speech. Or you may have ongoing stress, such as worry about work or school.  Depression, anxiety, and other mental or emotional conditions.  Changes in your sleep habits or surroundings. This includes changes that happen where you sleep, such as noise, light, or sleeping in a different bed. It also includes changes in your sleep pattern, such as having jet lag or working a late shift.  Health problems, such as pain, breathing problems, and restless legs syndrome.  Lack of regular exercise.  Using alcohol, nicotine, or  "caffeine before bed.  How can you help yourself?  Here are some tips that may help you sleep more soundly and wake up feeling more refreshed.  Your sleeping area   Use your bedroom only for sleeping and sex. A bit of light reading may help you fall asleep. But if it doesn't, do your reading elsewhere in the house. Try not to use your TV, computer, smartphone, or tablet while you are in bed.  Be sure your bed is big enough to stretch out comfortably, especially if you have a sleep partner.  Keep your bedroom quiet, dark, and cool. Use curtains, blinds, or a sleep mask to block out light. To block out noise, use earplugs, soothing music, or a \"white noise\" machine.  Your evening and bedtime routine   Create a relaxing bedtime routine. You might want to take a warm shower or bath, or listen to soothing music.  Go to bed at the same time every night. And get up at the same time every morning, even if you feel tired.  What to avoid   Limit caffeine (coffee, tea, caffeinated sodas) during the day, and don't have any for at least 6 hours before bedtime.  Avoid drinking alcohol before bedtime. Alcohol can cause you to wake up more often during the night.  Try not to smoke or use tobacco, especially in the evening. Nicotine can keep you awake.  Limit naps during the day, especially close to bedtime.  Avoid lying in bed awake for too long. If you can't fall asleep or if you wake up in the middle of the night and can't get back to sleep within about 20 minutes, get out of bed and go to another room until you feel sleepy.  Avoid taking medicine right before bed that may keep you awake or make you feel hyper or energized. Your doctor can tell you if your medicine may do this and if you can take it earlier in the day.  If you can't sleep   Imagine yourself in a peaceful, pleasant scene. Focus on the details and feelings of being in a place that is relaxing.  Get up and do a quiet or boring activity until you feel sleepy.  Avoid " "drinking any liquids before going to bed to help prevent waking up often to use the bathroom.  Where can you learn more?  Go to https://www.[employer].net/patiented  Enter J942 in the search box to learn more about \"Learning About Sleeping Well.\"  Current as of: July 31, 2024  Content Version: 14.3    2024 [employer].   Care instructions adapted under license by your healthcare professional. If you have questions about a medical condition or this instruction, always ask your healthcare professional. [employer] disclaims any warranty or liability for your use of this information.    Bladder Training: Care Instructions  Your Care Instructions     Bladder training is used to treat urge incontinence and stress incontinence. Urge incontinence means that the need to urinate comes on so fast that you can't get to a toilet in time. Stress incontinence means that you leak urine because of pressure on your bladder. For example, it may happen when you laugh, cough, or lift something heavy.  Bladder training can increase how long you can wait before you have to urinate. It can also help your bladder hold more urine. And it can give you better control over the urge to urinate.  It is important to remember that bladder training takes a few weeks to a few months to make a difference. You may not see results right away, but don't give up.  Follow-up care is a key part of your treatment and safety. Be sure to make and go to all appointments, and call your doctor if you are having problems. It's also a good idea to know your test results and keep a list of the medicines you take.  How can you care for yourself at home?  Work with your doctor to come up with a bladder training program that is right for you. You may use one or more of the following methods.  Delayed urination  In the beginning, try to keep from urinating for 5 minutes after you first feel the need to go.  While you wait, take deep, slow breaths " "to relax. Kegel exercises can also help you delay the need to go to the bathroom.  After some practice, when you can easily wait 5 minutes to urinate, try to wait 10 minutes before you urinate.  Slowly increase the waiting period until you are able to control when you have to urinate.  Scheduled urination  Empty your bladder when you first wake up in the morning.  Schedule times throughout the day when you will urinate.  Start by going to the bathroom every hour, even if you don't need to go.  Slowly increase the time between trips to the bathroom.  When you have found a schedule that works well for you, keep doing it.  If you wake up during the night and have to urinate, do it. Apply your schedule to waking hours only.  Kegel exercises  These tighten and strengthen pelvic muscles, which can help you control the flow of urine. (If doing these exercises causes pain, stop doing them and talk with your doctor.) To do Kegel exercises:  Squeeze your muscles as if you were trying not to pass gas. Or squeeze your muscles as if you were stopping the flow of urine. Your belly, legs, and buttocks shouldn't move.  Hold the squeeze for 3 seconds, then relax for 5 to 10 seconds.  Start with 3 seconds, then add 1 second each week until you are able to squeeze for 10 seconds.  Repeat the exercise 10 times a session. Do 3 to 8 sessions a day.  When should you call for help?  Watch closely for changes in your health, and be sure to contact your doctor if:    Your incontinence is getting worse.     You do not get better as expected.   Where can you learn more?  Go to https://www.healthMyGoodPoints.net/patiented  Enter V684 in the search box to learn more about \"Bladder Training: Care Instructions.\"  Current as of: April 30, 2024  Content Version: 14.3    2024 [employer] [facility] [employer].   Care instructions adapted under license by your healthcare professional. If you have questions about a medical condition or this instruction, always ask your " healthcare professional. [employer] [employer] [facility] disclaims any warranty or liability for your use of this information.        no

## 2025-01-14 LAB
CHOLEST SERPL-MCNC: 84 MG/DL
FASTING STATUS PATIENT QL REPORTED: NO
HDLC SERPL-MCNC: 34 MG/DL
LDLC SERPL CALC-MCNC: 28 MG/DL
NONHDLC SERPL-MCNC: 50 MG/DL
TRIGL SERPL-MCNC: 112 MG/DL

## 2025-01-14 NOTE — RESULT ENCOUNTER NOTE
Haresh,    You have diet controlled diabetes. Your cholesterol is well controlled.     Anya Nowak MD   80

## 2025-01-17 ENCOUNTER — HOME INFUSION BILLING (OUTPATIENT)
Dept: HOME HEALTH SERVICES | Facility: HOME HEALTH | Age: 75
End: 2025-01-17
Payer: COMMERCIAL

## 2025-01-17 PROCEDURE — B4152 EF CALORIE DENSE>/=1.5KCAL: HCPCS

## 2025-01-17 PROCEDURE — B4036 ENTERAL FEED SUP KIT GRAV BY: HCPCS

## 2025-01-18 PROCEDURE — B4036 ENTERAL FEED SUP KIT GRAV BY: HCPCS

## 2025-01-19 PROCEDURE — B4036 ENTERAL FEED SUP KIT GRAV BY: HCPCS

## 2025-01-20 PROCEDURE — B4036 ENTERAL FEED SUP KIT GRAV BY: HCPCS

## 2025-01-21 ENCOUNTER — TELEPHONE (OUTPATIENT)
Dept: PHARMACY | Facility: CLINIC | Age: 75
End: 2025-01-21
Payer: COMMERCIAL

## 2025-01-21 ENCOUNTER — VIRTUAL VISIT (OUTPATIENT)
Dept: PHARMACY | Facility: CLINIC | Age: 75
End: 2025-01-21
Attending: PSYCHIATRY & NEUROLOGY
Payer: COMMERCIAL

## 2025-01-21 DIAGNOSIS — R13.12 OROPHARYNGEAL DYSPHAGIA: ICD-10-CM

## 2025-01-21 DIAGNOSIS — K11.7 SIALORRHEA: ICD-10-CM

## 2025-01-21 DIAGNOSIS — E78.5 HYPERLIPIDEMIA WITH TARGET LDL LESS THAN 100: ICD-10-CM

## 2025-01-21 DIAGNOSIS — G47.00 INSOMNIA, UNSPECIFIED TYPE: ICD-10-CM

## 2025-01-21 DIAGNOSIS — G20.A1 PARKINSON'S DISEASE WITHOUT FLUCTUATING MANIFESTATIONS, UNSPECIFIED WHETHER DYSKINESIA PRESENT (H): Primary | ICD-10-CM

## 2025-01-21 DIAGNOSIS — K11.7 SIALORRHEA: Primary | ICD-10-CM

## 2025-01-21 DIAGNOSIS — I48.0 PAROXYSMAL ATRIAL FIBRILLATION (H): ICD-10-CM

## 2025-01-21 DIAGNOSIS — R35.0 FREQUENT URINATION: ICD-10-CM

## 2025-01-21 DIAGNOSIS — K21.00 GASTROESOPHAGEAL REFLUX DISEASE WITH ESOPHAGITIS WITHOUT HEMORRHAGE: ICD-10-CM

## 2025-01-21 PROCEDURE — B4036 ENTERAL FEED SUP KIT GRAV BY: HCPCS

## 2025-01-21 RX ORDER — ATROPINE SULFATE 10 MG/ML
SOLUTION/ DROPS OPHTHALMIC
Qty: 10 ML | Refills: 11 | Status: SHIPPED | OUTPATIENT
Start: 2025-01-21

## 2025-01-21 NOTE — PROGRESS NOTES
Medication Therapy Management (MTM) Encounter    ASSESSMENT:                            Medication Adherence/Access: No issues identified.    Parkinson's Disease:    Motor symptoms appear to be relatively stable; his primary concerns are swallowing issues (see below) and insomnia     Insomnia:   Patient may benefit from a trial of low dose melatonin to help shift his circadian cycles and sleep more at night/less during the day     GERD  /dysphagia /sialorrhea   Patient is having more issues with sialorrhea and ipratropium drops were not helpful so he may benefit from trying atropine drops and Dr. Hill reviewed the prescription and agrees with the plan      Hyperlipidemia   Stable      Afib:   Stable     Frequent urination:   Patient advised to follow up with urology to consider other treatments, if interested     PLAN:                            We will try atropine eye drops in your mouth to help with drooling/extra saliva. Please instill 1-2 drops in the cheek or under the tongue every 8 hours as needed. Decrease dose if experiencing dry mouth.   We are also going to try Melatonin to help with your sleep cycle so you can sleep more at night and less during the day. Start with melatonin 3 mg nightly, 1 hour before bedtime.   Follow up urologist if your urinary frequency/urgency is bothersome and you'd like to discuss other options.     Follow-up: 2/24/25 at 11:30 am video visit    SUBJECTIVE/OBJECTIVE:                          Haresh Rock is a 74 year old male seen for a follow-up visit.       Reason for visit: follow up on medications.    Allergies/ADRs: Reviewed in chart  Past Medical History: Reviewed in chart  Tobacco: He reports that he has never smoked. He has never been exposed to tobacco smoke. He has never used smokeless tobacco.  Alcohol: not currently using    Medication Adherence/Access: no issues reported.  8-9 am- swallow morning pills by mouth- flecainide, carbidopa-levodopa, omeprazole, metoprolol  " + syringes of water to add some liquid to it. Gets these ones down pretty easy. He coughs to not aspirate. He has to cough between pills.   9-11 am- morning feeding for 45 minutes (at least 1 hour after morning medication)  12 pm- nap (at least 1 hour after feeding is completed) & loratadine ODT (by mouth)  2 pm- carbidopa-levodopa crushed via G-tube  3-4 pm- afternoon feeding for 45 minutes  7-7:30 pm- evening pills crushed via G-tube- warfarin, flecainide, rosuvastatin, carbidopa-levodopa   9 pm- evening feeding    Parkinson's Disease:    - carbidopa-levodopa  mg, 1.5 tablets at 8-9 am, 2 tablets at 2 pm, 1 tablet at 7-7:30 pm  This is an increased dose of carbidopa-levodopa since he saw Dr. Sergio valadez. Patient reports that the higher dose of carbidopa-levodopa didn't make much difference in his symptoms. He gets more tremor in the afternoon if he is sitting around and watching TV.   Patient states that his biggest problems are his swallowing and voice changes.     Insomnia:   - no current medications  Patient states he is having more trouble with sleep over the last year. He never used to go to bed before midnight but now he can be up as late as 3-5 am unable to sleep. He ends up sleeping a lot during the day which he thinks impacts his sleep at night.   Not sleeping until 3-5 am.  Sleeps a lot during the day.   Sleep issues worsened over last year. Never went to bed before midnight.   Spacing pills and feeds 3 times/day. Not much freedom to go down things. Will shift times for appointments.      GERD  /dysphagia/ sialorrhea   - omeprazole 20 mg every morning  Patient reports that he gets quite a bit of \"drainge\" especially in the morning, depending on what position he is in. If he leans back in the [place] he coughs more. He tried ipratropium in his mouth to decresae saliva but states that after a week didn't notice any improvement.   His dietician Lorena reduced his formula from 6 to 5 cans/day as he was " gaining too much weight from the feeds. He gets 355 calories per carton. One of the feeds in the evening was reduced. He tends to get more reflux when lying down. Also tends to get more drooling when lying down.     Hyperlipidemia   - rosuvastatin 40 mg daily  Patient reports no significant myalgias or other side effects.  The ASCVD Risk score (Felisha FLANAGAN, et al., 2019) failed to calculate for the following reasons:    The valid total cholesterol range is 130 to 320 mg/dL     Afib:   - flecainide 50 mg twice daily  - metoprolol 12.5 mg twice daily  - warfarin 3.75 mg every Sun/Tues/Fri and 2.5 mg all other days   Patient reports his INRs have been very stable. No concerns reported today.      Frequent urination:   - no current medications  Patient previously tried Flomax which was not effective. Then he saw urology and tried trospium which also reportedly did not help. He is still getting up multiple times nightly to urinate.       Today's Vitals: There were no vitals taken for this visit.  ----------------      I spent 20 minutes with this patient today. All changes were made via collaborative practice agreement with Dr. Hill.     A summary of these recommendations was sent via TalkPlus.    Leslie Clifford, Pharm.D.  Medication Therapy Management Pharmacist  [facility]    Telemedicine Visit Details  The patient's medications can be safely assessed via a telemedicine encounter.  Type of service:  Video Conference via [employer]  Originating Location (pt. Location): Home    Distant Location (provider location):  Off-site  Start Time:  11:29 AM  End Time: 11:49 AM     Medication Therapy Recommendations  Insomnia, unspecified type   1 Rationale: Untreated condition - Needs additional medication therapy - Indication   Recommendation: Start Medication - melatonin 3 MG tablet   Status: Accepted - no CPA Needed   Identified Date: 1/21/2025 Completed Date: 1/21/2025         Sialorrhea   1 Rationale: Untreated  condition - Needs additional medication therapy - Indication   Recommendation: Start Medication - Atropine Sulfate 0.01 % Soln   Status: Accepted per Provider   Identified Date: 1/21/2025 Completed Date: 1/21/2025

## 2025-01-21 NOTE — PATIENT INSTRUCTIONS
"Recommendations from today's MTM visit:                                                      We will try atropine eye drops in your mouth to help with drooling/extra saliva. Please instill 1-2 drops in the cheek or under the tongue every 8 hours as needed. Decrease dose if experiencing dry mouth.   We are also going to try Melatonin to help with your sleep cycle so you can sleep more at night and less during the day. Start with melatonin 3 mg nightly, 1 hour before bedtime.   Follow up urologist if your urinary frequency/urgency is bothersome and you'd like to discuss other options.     Follow-up: 2/24/25 at 11:30 am video visit    It was great speaking with you today.  I value your experience and would be very thankful for your time in providing feedback in our clinic survey. In the next few days, you may receive an email or text message from [employer] with a link to a survey related to your  clinical pharmacist.\"     To schedule another MTM appointment, please call the clinic directly or you may call the MTM scheduling line at [phone].    My Clinical Pharmacist's contact information:                                                      Please feel free to contact me with any questions or concerns you have.      Leslie Clifford, Pharm.D.  Medication Therapy Management Pharmacist  [facility]     "

## 2025-01-21 NOTE — TELEPHONE ENCOUNTER
Ipratropium spray didn't work well for sialorrhea. He would like to try atropine drops. Order pended for Dr. Hill to review & sign.    Leslie Clifford, Pharm.D.  Medication Therapy Management Pharmacist  [facility]

## 2025-01-22 PROCEDURE — B4036 ENTERAL FEED SUP KIT GRAV BY: HCPCS

## 2025-01-23 PROCEDURE — B4036 ENTERAL FEED SUP KIT GRAV BY: HCPCS

## 2025-01-24 PROCEDURE — B4036 ENTERAL FEED SUP KIT GRAV BY: HCPCS

## 2025-01-25 PROCEDURE — B4036 ENTERAL FEED SUP KIT GRAV BY: HCPCS

## 2025-01-26 LAB — INR HOME MONITORING: 3 RATIO (ref 2–3)

## 2025-01-26 PROCEDURE — B4036 ENTERAL FEED SUP KIT GRAV BY: HCPCS

## 2025-01-27 ENCOUNTER — DOCUMENTATION ONLY (OUTPATIENT)
Dept: ANTICOAGULATION | Facility: CLINIC | Age: 75
End: 2025-01-27
Payer: COMMERCIAL

## 2025-01-27 DIAGNOSIS — Z86.711 PERSONAL HISTORY OF PE (PULMONARY EMBOLISM): ICD-10-CM

## 2025-01-27 DIAGNOSIS — Z79.01 LONG TERM CURRENT USE OF ANTICOAGULANT THERAPY: Primary | ICD-10-CM

## 2025-01-27 DIAGNOSIS — I48.0 PAROXYSMAL ATRIAL FIBRILLATION (H): ICD-10-CM

## 2025-01-27 PROCEDURE — B4036 ENTERAL FEED SUP KIT GRAV BY: HCPCS

## 2025-01-27 NOTE — PROGRESS NOTES
ANTICOAGULATION  MANAGEMENT-Home Monitor Managed by Exception    Haresh Rock 74 year old male is on warfarin with therapeutic INR result. (Goal INR 2.0-3.0)    Recent labs: (last 7 days)     01/26/25  1808   INR 3       Previous INR was Therapeutic  Medication, diet, health changes since last INR:Yes: had a fall on 1/24/25 and hit head, he was [facility] at [facility] ED and was checked out and cleared, but not anticipated to affect INR  Contacted within the last 12 weeks by phone on 1/3/25   Due for next call no later than: 4/6/25.   Last [facility] referral date: 05/24/2024      PLAN     Haresh was NOT contacted regarding therapeutic result today per home monitoring policy manage by exception agreement.   Current warfarin dose is to be continued:     Summary  As of 1/27/2025      Full warfarin instructions:  3.75 mg every Sun, Tue, Thu; 2.5 mg all other days   Next INR check:  --             ?   Blanca Rodriguez RN  Anticoagulation Clinic  1/27/2025    _______________________________________________________________________     Anticoagulation Episode Summary       Current INR goal:  2.0-3.0   TTR:  86.6% (1 y)   Target end date:  Indefinite   Send INR reminders to:  [facility] MONITORING    Indications    Long-term (current) use of anticoagulants [Z79.01] [Z79.01]  Atrial fibrillation (H) (Resolved) [I48.91]  Antiphospholipid antibody syndrome (Resolved) [D68.61]  Personal history of DVT (deep vein thrombosis) (Resolved) [Z86.718]  Atrial fibrillation  unspecified type (H) (Resolved) [I48.91]  Paroxysmal atrial fibrillation (H) [I48.0]  Chronic atrial fibrillation (H) (Resolved) [I48.20]  Personal history of PE (pulmonary embolism) [Z86.711]             Comments:  JESSICA rodas to manage by exception             Anticoagulation Care Providers       Provider Role Specialty Phone number    Anya Nowak MD Referring Family Medicine [phone]    Elizabeth Balderas MD Referring HOSPITALIST [phone]

## 2025-01-28 PROCEDURE — B4036 ENTERAL FEED SUP KIT GRAV BY: HCPCS

## 2025-01-29 PROCEDURE — B4036 ENTERAL FEED SUP KIT GRAV BY: HCPCS

## 2025-01-30 PROCEDURE — B4036 ENTERAL FEED SUP KIT GRAV BY: HCPCS

## 2025-01-31 PROCEDURE — B4036 ENTERAL FEED SUP KIT GRAV BY: HCPCS

## 2025-02-01 PROCEDURE — B4036 ENTERAL FEED SUP KIT GRAV BY: HCPCS

## 2025-02-02 PROCEDURE — B4036 ENTERAL FEED SUP KIT GRAV BY: HCPCS

## 2025-02-03 PROCEDURE — B4036 ENTERAL FEED SUP KIT GRAV BY: HCPCS

## 2025-02-04 PROCEDURE — B4036 ENTERAL FEED SUP KIT GRAV BY: HCPCS

## 2025-02-05 ENCOUNTER — ANCILLARY PROCEDURE (OUTPATIENT)
Dept: MRI IMAGING | Facility: CLINIC | Age: 75
End: 2025-02-05
Attending: NURSE PRACTITIONER
Payer: COMMERCIAL

## 2025-02-05 ENCOUNTER — OFFICE VISIT (OUTPATIENT)
Dept: PEDIATRICS | Facility: CLINIC | Age: 75
End: 2025-02-05
Payer: COMMERCIAL

## 2025-02-05 ENCOUNTER — OFFICE VISIT (OUTPATIENT)
Dept: FAMILY MEDICINE | Facility: CLINIC | Age: 75
End: 2025-02-05
Payer: COMMERCIAL

## 2025-02-05 VITALS
TEMPERATURE: 97.1 F | DIASTOLIC BLOOD PRESSURE: 70 MMHG | BODY MASS INDEX: 25.77 KG/M2 | RESPIRATION RATE: 22 BRPM | HEART RATE: 67 BPM | OXYGEN SATURATION: 97 % | WEIGHT: 180 LBS | SYSTOLIC BLOOD PRESSURE: 114 MMHG | HEIGHT: 70 IN

## 2025-02-05 VITALS
SYSTOLIC BLOOD PRESSURE: 109 MMHG | RESPIRATION RATE: 17 BRPM | DIASTOLIC BLOOD PRESSURE: 56 MMHG | HEART RATE: 65 BPM | OXYGEN SATURATION: 98 % | TEMPERATURE: 97.5 F

## 2025-02-05 DIAGNOSIS — Z48.02 VISIT FOR SUTURE REMOVAL: ICD-10-CM
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
## 2025-02-05 DIAGNOSIS — H57.02 ANISOCORIA: ICD-10-CM

## 2025-02-05 DIAGNOSIS — H57.00: Primary | ICD-10-CM

## 2025-02-05 DIAGNOSIS — M25.511 ACUTE PAIN OF RIGHT SHOULDER: ICD-10-CM

## 2025-02-05 PROCEDURE — 99215 OFFICE O/P EST HI 40 MIN: CPT | Performed by: NURSE PRACTITIONER

## 2025-02-05 PROCEDURE — 99207 PR FIRST ORDER ACUTE REFERRAL: CPT | Performed by: NURSE PRACTITIONER

## 2025-02-05 PROCEDURE — 99417 PROLNG OP E/M EACH 15 MIN: CPT | Performed by: NURSE PRACTITIONER

## 2025-02-05 PROCEDURE — B4036 ENTERAL FEED SUP KIT GRAV BY: HCPCS

## 2025-02-05 RX ORDER — GADOBUTROL 604.72 MG/ML
8 INJECTION INTRAVENOUS ONCE
Status: COMPLETED | OUTPATIENT
Start: 2025-02-05 | End: 2025-02-05

## 2025-02-05 RX ADMIN — GADOBUTROL 8 ML: 604.72 INJECTION INTRAVENOUS at 14:42

## 2025-02-05 ASSESSMENT — ENCOUNTER SYMPTOMS
CONFUSION: 0
HEADACHES: 0

## 2025-02-05 NOTE — PROGRESS NOTES
Assessment & Plan         1. Unequal reaction of pupils (Primary)  S/P ED visit 1/24/25 for fall where 3 sutures were placed. Reviewed ED note.    He denies new headaches or confusion.    He is Aox3, no asymmetrical weakness. Dysarthria and parkinsonian gait noted.    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 This appears new when compared to previous opth exams in his his chart. He denies acute vision changes.    Discussed with ADS, patient scheduled for today.        2. Visit for suture removal  Sutures removed without complication.  - REMOVAL OF SUTURES    3. Acute pain of right shoulder  X-ray negative. Refer to PT.  - Physical Therapy  Referral; Future        Referral to Acute and Diagnostic Services    [phone] ([place]) [address] [address]    Transition to Acute & Diagnostic Services Clinic has been discussed with patient, and he agrees with next level of care.   Patient understands that evaluation/treatment at [facility] typically takes significantly longer than in clinic/urgent care (>2 hours).  The [facility] Acute and Diagnostics Services Clinic has been contacted by provider/staff to confirm patient acceptance.         Special issues:  None                     The following provider has assessed this patient for intervention at [facility], and directed the patient for referral: DEJAH Wilde CNP                Follow-up after ADS, sooner if needed.    All questions/concerns addressed. Patient stated understanding/agreement to plan of care.        Note: Chart documentation was done in part with Dragon Voice Recognition software.  Although reviewed after completion, some word and grammatical errors may remain. Please contact author for any clarification or concerns.        Arsh Hutson is a 74 year old, presenting for the following health issues:  Suture Removal      2/5/2025     9:54 AM   Additional Questions   Roomed  by Zion     History of Present Illness       Reason for visit:  Remove 3 stitches from cheek, caused by a fall on Jan 24. Placed by [place] [facility].   He is taking medications regularly.     S/P ED visit 1/24/25 for fall where 3 sutures were placed.  Still having R shoulder pain/ decreased ROM. Pain only occurs with movement. No other acute concerns/symptoms at time of exam.          Review of Systems   Neurological:  Negative for headaches.   Psychiatric/Behavioral:  Negative for confusion.    Constitutional, HEENT, cardiovascular, pulmonary, gi and gu systems are negative, except as otherwise noted.    Past Medical History:   Diagnosis Date    Abnormal dopamine scan (DaTSCAN) 2020 11/04/2020    [phone] 11/3/2020   Narrative & Impression   Examination: Nuclear medicine DATscan for Dopamine Receptor Localization.   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    Atrial fibrillation (H) 04/2011    Benign prostatic hyperplasia with urinary retention     Cirrhosis (H)     CKD (chronic kidney disease) stage 2, GFR 60-89 ml/min     Diabetes mellitus type II     steroid induced    DJD (degenerative joint disease) of knee     SHAWN, worse on right    DVT (deep venous thrombosis) (H)     ED (erectile dysfunction)     Gallbladder polyp 05/2010    Zshtk-Vpuzue-Pxoz Disease 2007    BMT    Heart failure with preserved ejection fraction, NYHA class I (H)     Hemochromatosis     Hodgkin's disease NOS     5 cycles of ABVD in 2011    HTN (hypertension)     Hyperlipidaemia LDL goal <100     Multiple thyroid nodules     benign     Myeloproliferative  disorder (H)     atypical, [facility]    Parkinson disease (H) 09/24/2020    PE (pulmonary embolism) 11/2004    Polyneuropathy     Pressure ulcer     Primary pulmonary hypertension (H)     Severe protein-calorie malnutrition 11/10/2021    Small bowel obstruction (H) 10/29/2021    Thrombocytopenia 06/08/2021    Thyroid nodule 05/17/2016       Past Surgical History:   Procedure Laterality Date    ANESTHESIA CARDIOVERSION  04/21/2011    Procedure:ANESTHESIA CARDIOVERSION; Surgeon:GENERIC ANESTHESIA PROVIDER; Location:[facility] OR    ARTHROSCOPY KNEE RT/LT      LT    CATARACT IOL, RT/LT  2017    COLONOSCOPY  10/16/2012    Procedure: COLONOSCOPY;  Colonoscopy, screening;  Surgeon: Reg Feliciano MD;  Location: [facility] OR    COLONOSCOPY N/A 04/14/2021    Procedure: COLONOSCOPY;  Surgeon: Reg Holm MD;  Location: [facility] GI    ESOPHAGOSCOPY, GASTROSCOPY, DUODENOSCOPY (EGD), COMBINED N/A 10/07/2020    Procedure: ESOPHAGOGASTRODUODENOSCOPY, WITH BIOPSY;  Surgeon: Raul Sawyer DO;  Location: [facility] OR    H ABLATION FOCAL AFIB  03/05/2013    PVI    H ABLATION FOCAL AFIB  01/01/2018    HC BONE MARROW/STEM TRANSPLANT ALLOGENIC  05/08/2007    INSERT PORT VASCULAR ACCESS      IR GASTROSTOMY TUBE PERCUTANEOUS PLCMNT  10/25/2021    JOINT REPLACEMTN, KNEE RT/LT  03/28/2012    SHAWN    LAPAROSCOPY DIAGNOSTIC (GENERAL) N/A 10/29/2021    Procedure: LAPAROSCOPY, DIAGNOSTIC, TAKEDOWN OF MALPOSITIONED GASTROSTOMY TUBE, INSERTION OF GASTROSTOMY TUBE, SMALL BOWEL RESECTION X1, PRIMARY REPAIR OF SMALL BOWEL ENTEROTOMY, ABDOMINAL WASHOUT, TAP BLOCK;  Surgeon: Leif Finney DO;  Location: [facility] OR    PEG TUBE PLACEMENT  10/25/2021    VASECTOMY  1990       Family History   Problem Relation Age of Onset    Hypertension Mother     Cerebrovascular Disease Mother     Breast Cancer Mother     Alzheimer Disease Father     Arrhythmia Sister         supraventricular tachycardia    Thyroid Disease Sister     Pneumonia Sister         covid19     "Other - See Comments Sister         lives in [place]    Arrhythmia Brother     Prostate Cancer Brother     Other - See Comments Brother         [place]    Cancer Brother         lymphome or bone cancer    Gastrointestinal Disease Maternal Grandmother         colitis     Thyroid Cancer Daughter         had thyroid removed    Bipolar Disorder Daughter     Other - See Comments Daughter         lives in [place] - single with one son 9  yrs    Thyroid Disease Daughter     Other - See Comments Son         lives in [place]. no kids and not .    Obsessive Compulsive Disorder Son     Depression Son     Eating Disorder Son         eats when depressed    Obesity Son     Diabetes Paternal Aunt     Parkinsonism Other         2 cousins with parkinson    C.A.D. No family hx of        Social History     Tobacco Use    Smoking status: Never     Passive exposure: Never    Smokeless tobacco: Never   Substance Use Topics    Alcohol use: No               Objective    /70 (BP Location: Right arm, Patient Position: Sitting, Cuff Size: Adult Regular)   Pulse 67   Temp 97.1  F (36.2  C) (Temporal)   Resp 22   Ht 1.788 m (5' 10.39\")   Wt 81.6 kg (180 lb)   SpO2 97%   BMI 25.54 kg/m    Body mass index is 25.54 kg/m .  Physical Exam  Constitutional:       General: He is not in acute distress.     Appearance: He is not ill-appearing.   Eyes:      Extraocular Movements: Extraocular movements intact.      Right eye: Normal extraocular motion.      Left eye: Normal extraocular motion.      Pupils: Pupils are unequal.      Left eye: Pupil is not reactive.      Comments: L pupil not responsive to light.  R pupil with brisk response.  Anisocoria (L pupil >R)   Cardiovascular:      Rate and Rhythm: Normal rate and regular rhythm.      Heart sounds: Normal heart sounds. No murmur heard.  Pulmonary:      Effort: Pulmonary effort is normal. No respiratory distress.      Breath sounds: Normal breath sounds. No " wheezing or rales.   Musculoskeletal:      Right shoulder: No tenderness. Decreased range of motion.      Left shoulder: Decreased range of motion.      Cervical back: Neck supple.      Comments: R shoulder Pain with external rotation.R perry negative.   Lymphadenopathy:      Cervical: No cervical adenopathy.   Skin:         Neurological:      Mental Status: He is alert and oriented to person, place, and time.      GCS: GCS eye subscore is 4. GCS verbal subscore is 5. GCS motor subscore is 6.      Cranial Nerves: Dysarthria present.      Comments: Parkinsonian gait.  BUE/BLE strength equal.   Psychiatric:         Thought Content: Thought content normal.         Judgment: Judgment normal.        Suture removal:   Date sutures applied: 1/24/25         Where (setting) in which they applied:ER visit  Description:  Type: sutures  Location: R cheek  History:    Cause of laceration: Fall  Accompanying Signs & Symptoms: (staff: if yes-describe)  Redness: No  Warmth: No  Drainage: No  Still bleeding: No  Fevers: No  Last tetanus shot: last tetanus booster within 10 years            Procedure-Area prepped with povidone iodine. Using standard technique, 3 sutures from R cheek laceration removed without complication.        CT Head w/o Contrast  Order: [id number]  Impression    1.  No CT evidence for acute intracranial process.  2.  Brain atrophy and presumed chronic microvascular ischemic changes as above.  Narrative    For Patients: As a result of the 21st Century Cures Act, medical imaging exams and procedure reports are released immediately into your electronic medical record. You may view this report before your referring provider. If you have questions, please contact your health care provider.    EXAM: CT HEAD BRAIN WO  LOCATION: [facility]  DATE: 1/24/2025    INDICATION: Head trauma, moderate-severe. Injury. Pain.  COMPARISON: None.  TECHNIQUE: Routine CT Head without IV contrast. Multiplanar reformats. Dose  reduction techniques were used.    FINDINGS:  INTRACRANIAL CONTENTS: No intracranial hemorrhage, extraaxial collection, or mass effect.  No CT evidence of acute infarct. Mild presumed chronic small vessel ischemic changes. Mild to moderate generalized volume loss. No hydrocephalus. Skull base vascular calcifications.    VISUALIZED ORBITS/SINUSES/MASTOIDS: Prior right cataract surgery. Visualized portions of the orbits are otherwise unremarkable. No paranasal sinus mucosal disease. No middle ear or mastoid effusion.    BONES/SOFT TISSUES: No acute abnormality.      Impression    No acute fracture. Marked acromioclavicular and mild glenohumeral joint degenerative arthritis.  Narrative    For Patients: As a result of the 21st Century Cures Act, medical imaging exams and procedure reports are released immediately into your electronic medical record. You may view this report before your referring provider. If you have questions, please contact your health care provider.    EXAM: XR SHOULDER 3 VIEWS RIGHT  LOCATION: [facility]  DATE: 1/24/2025    INDICATION: Pain  COMPARISON: None.    Signed Electronically by: DEJAH Wilde CNP      I spent a total of 60 minutes on the day of the visit. Time spent by me includes doing chart review, history and exam, documentation and further activities per the note

## 2025-02-05 NOTE — PROGRESS NOTES
Acute and Diagnostic Services Clinic Visit    Assessment & Plan   Problem List Items Addressed This Visit    None  Visit Diagnoses       Fall, sequela    -  Primary    Relevant Orders    CT Head w/o Contrast    Anisocoria        Relevant Orders    MR Brain w/o & w Contrast         74-year-old male patient with chronic conditions including polyneuropathy, Parkinson's disease, chronic rhinitis, hereditary hemochromatosis, cirrhosis of the liver, gastrostomy tube in place, hyperlipidemia, type 2 diabetes, paroxysmal atrial fibs, chronic anticoagulation, BPH, thrombocytopenia, pulmonary transplant, history of PE,Patient was seen in the emergency department on 1/24/2025 after sustaining a closed head injury from a fall.  CT was done at that time as patient is anticoagulated and was negative.  Patient was seen in clinic earlier today for suture removal was noted to have unequal reaction of pupils noting he has anisocoria (L pupil >R) and with no left pupil response to light and this does appear new when compared to previous ophthalmic exams in patient's chart.  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  MRI is negative.  Patient is referred back to ophthalmology and neurology.  Patient and family member advised if symptoms persist worsen or new symptoms arise they are to seek emergency medical care all questions addressed he verbalized understanding agree with plan    > 70 minutes were spent doing chart review, history and exam, documentation and further activities per the note.          No follow-ups on file.      Arsh Hutson is a 74 year old, presenting for the following health issues:  Neurologic Problem        2/5/2025     9:54 AM   Additional Questions    Roomed by Zion KAY     Evaluation of Neurologic Symptoms  Onset/Duration: 2/5  Description:  Weakness/location: none  Numbness/tingling: pain in right shoulder hurts to raise it  Headaches: no  History of migraines: no  Other pain: no   Dizziness: YES- this morning but no longer  Visual changes: YES  Speech changes: no   Memory changes: no   Personality changes: YES- over the last few months has been having hallucinations.            Loss or change of consciousness: no   Progression of symptoms same  Accompanying signs and symptoms:  Fever: no  Stiff neck: no   Neck or upper back pain:  no  ENT or URI symptoms: no            Nausea or vomiting: no   History    Head or other trauma: YES- 1/24 fall with head injury            Prior evaluation: YES  Precipitating or Alleviating factors:           Things that improve symptoms:  none           Things that worsen symptoms: none  Therapies tried and outcome:         Review of Systems  Constitutional, HEENT, cardiovascular, pulmonary, GI, , musculoskeletal, neuro, skin, endocrine and psych systems are negative, except as otherwise noted.      Objective    /56 (BP Location: Right arm, Patient Position: Sitting, Cuff Size: Adult Regular)   Pulse 65   Temp 97.5  F (36.4  C) (Oral)   Resp 17   SpO2 98%   There is no height or weight on file to calculate BMI.  Physical Exam   GENERAL: alert and no distress  EYES: Eyes grossly normal to inspection, pupils are unequal and the left pupil is nonreactive conjunctivae and sclerae normal  RESP: Respirations are regular nonlabored  MS: no gross musculoskeletal defects noted, no edema  SKIN: no suspicious lesions or rashes  NEURO: Normal strength and tone, mentation intact and speech normal  PSYCH: mentation appears normal, affect normal/bright    Results for orders placed or performed in visit on 02/05/25   MR Brain w/o & w Contrast     Status: None    Narrative    EXAM: MR BRAIN W/O and W CONTRAST  [facility] [facility]  [place]: 2/5/2025    INDICATION:  Anisocoria  COMPARISON: None.  CONTRAST: 8 ml gadavist  TECHNIQUE: Routine multiplanar multisequence head MRI without and with intravenous contrast.    FINDINGS:  INTRACRANIAL CONTENTS: No acute or subacute infarct. No mass, acute hemorrhage, or extra-axial fluid collections. Normal brain parenchymal signal. Mild generalized cerebral atrophy. No hydrocephalus. Mild cerebellar atrophy. No pathologic contrast   enhancement.    SELLA: No abnormality accounting for technique.    OSSEOUS STRUCTURES/SOFT TISSUES: Normal marrow signal. The major intracranial vascular flow voids are maintained.     ORBITS: No abnormality accounting for technique.     SINUSES/MASTOIDS: No paranasal sinus mucosal disease. No middle ear or mastoid effusion.       Impression    IMPRESSION: Negative for acute intracranial hemorrhage, infarct, hydrocephalus or mass. No pathologic postcontrast enhancement.     No results found. However, due to the size of the patient record, not all encounters were searched. Please check Results Review for a complete set of results.        Signed Electronically by: Hilda Wright NP

## 2025-02-06 PROCEDURE — B4036 ENTERAL FEED SUP KIT GRAV BY: HCPCS

## 2025-02-07 PROCEDURE — B4036 ENTERAL FEED SUP KIT GRAV BY: HCPCS

## 2025-02-07 NOTE — PROGRESS NOTES
ANTICOAGULATION MANAGEMENT     Haresh Rock 74 year old male is on warfarin with therapeutic INR result. (Goal INR 2.0-3.0)    Recent labs: (last 7 days)     09/27/24  1644   INR 2.8       ASSESSMENT     Source(s): Chart Review and Patient/Caregiver Call     Warfarin doses taken: Warfarin taken as instructed  Diet: No new diet changes identified  Medication/supplement changes: None noted  New illness, injury, or hospitalization: No  Signs or symptoms of bleeding or clotting: No  Previous result: Therapeutic last 2(+) visits  Additional findings: Patient is okay to resume MBE per home monitor protocol if next INR is therapeutic         PLAN     Recommended plan for no diet, medication or health factor changes affecting INR     Dosing Instructions: Continue your current warfarin dose with next INR in 1 week       Summary  As of 9/27/2024      Full warfarin instructions:  3.75 mg every Sun, Tue, Thu; 2.5 mg all other days   Next INR check:  10/4/2024               Telephone call with Haresh who verbalizes understanding and agrees to plan    Patient to recheck with home meter    Education provided: Resume manage by exception with home monitor. Continue to submit INR results to home monitor company.You will only be called when your result is out of range. Please call and notify [facility] if new medication started, dose missed, signs or symptoms of bleeding or clotting, or a surgery/procedure is scheduled. Due for next call no later than: 12/26/24.  Contact [phone] with any changes, questions or concerns.     Plan made per [facility] anticoagulation protocol    Shanice Dhillon RN  9/27/2024  Anticoagulation Clinic  [facility] for routing messages: [employer] [employer] HOME MONITORING  [facility] patient phone line: [phone]        _______________________________________________________________________     Anticoagulation Episode Summary       Current INR goal:  2.0-3.0   TTR:  80.5% (1 y)   Target end date:  Indefinite   Send INR reminders to:   ANTICOAG HOME MONITORING    Indications    Long-term (current) use of anticoagulants [Z79.01] [Z79.01]  Atrial fibrillation (H) (Resolved) [I48.91]  Antiphospholipid antibody syndrome (H) (Resolved) [D68.61]  Personal history of DVT (deep vein thrombosis) (Resolved) [Z86.718]  Atrial fibrillation  unspecified type (H) (Resolved) [I48.91]  Paroxysmal atrial fibrillation (H) [I48.0]  Chronic atrial fibrillation (H) (Resolved) [I48.20]  Personal history of PE (pulmonary embolism) [Z86.711]             Comments:  JESSICA rodas to manage by exception             Anticoagulation Care Providers       Provider Role Specialty Phone number    Anya Nowak MD Referring Family Medicine [phone]    Elizabeth Balderas MD Referring HOSPITALIST [phone]             11

## 2025-02-08 PROCEDURE — B4036 ENTERAL FEED SUP KIT GRAV BY: HCPCS

## 2025-02-09 PROCEDURE — B4036 ENTERAL FEED SUP KIT GRAV BY: HCPCS

## 2025-02-10 ENCOUNTER — OFFICE VISIT (OUTPATIENT)
Dept: SURGERY | Facility: CLINIC | Age: 75
End: 2025-02-10
Payer: COMMERCIAL

## 2025-02-10 VITALS
HEART RATE: 68 BPM | WEIGHT: 178.7 LBS | DIASTOLIC BLOOD PRESSURE: 70 MMHG | HEIGHT: 70 IN | SYSTOLIC BLOOD PRESSURE: 122 MMHG | OXYGEN SATURATION: 100 % | BODY MASS INDEX: 25.58 KG/M2

## 2025-02-10 DIAGNOSIS — Z43.1 ATTENTION TO G-TUBE (H): Primary | ICD-10-CM

## 2025-02-10 PROCEDURE — B4036 ENTERAL FEED SUP KIT GRAV BY: HCPCS

## 2025-02-10 PROCEDURE — 99212 OFFICE O/P EST SF 10 MIN: CPT | Mod: 25 | Performed by: SURGERY

## 2025-02-10 PROCEDURE — 43762 RPLC GTUBE NO REVJ TRC: CPT | Performed by: SURGERY

## 2025-02-10 ASSESSMENT — PAIN SCALES - GENERAL: PAINLEVEL_OUTOF10: MILD PAIN (1)

## 2025-02-10 NOTE — LETTER
"2/10/2025       RE: Harehs Rock  [address] [place] [address]     Dear Colleague,    Thank you for referring your patient, Haresh Rock, to the [facility] GENERAL SURGERY CLINIC [facility]. Please see a copy of my visit note below.    I met with Haresh Rock today for Gtube exchange.  Last known exchange was in 2023.    He notes some leakage around the tubing but no other issues.    PE:  /70 (BP Location: Left arm, Patient Position: Sitting, Cuff Size: Adult Regular)   Pulse 68   Ht 1.778 m (5' 10\")   Wt 81.1 kg (178 lb 11.2 oz)   SpO2 100%   BMI 25.64 kg/m    Alert, pleasant  No resp distress  Abd is soft, nondistended  No erythema along Gtube site    I removed the existing low profile 20F tube after removing the water from the balloon.  Inserted a new 2cm 20F Jerrell-Key tube and instilled 4ml of water.  No complications.    He may follow-up in 6-12 months for repeat exchange as desired      Again, thank you for allowing me to participate in the care of your patient.      Sincerely,    Tyler Philippe MD    "

## 2025-02-10 NOTE — PROGRESS NOTES
"I met with Haresh Rock today for Gtube exchange.  Last known exchange was in 2023.    He notes some leakage around the tubing but no other issues.    PE:  /70 (BP Location: Left arm, Patient Position: Sitting, Cuff Size: Adult Regular)   Pulse 68   Ht 1.778 m (5' 10\")   Wt 81.1 kg (178 lb 11.2 oz)   SpO2 100%   BMI 25.64 kg/m    Alert, pleasant  No resp distress  Abd is soft, nondistended  No erythema along Gtube site    I removed the existing low profile 20F tube after removing the water from the balloon.  Inserted a new 2cm 20F Jerrell-Key tube and instilled 4ml of water.  No complications.    He may follow-up in 6-12 months for repeat exchange as desired  "

## 2025-02-10 NOTE — PATIENT INSTRUCTIONS
You met with Dr. Tyler Philippe.      Today's visit instructions:    Return to the Surgery Clinic on an as needed basis.        If you have questions please contact Annabelle RN or Keya RN during regular clinic hours, Monday through Friday 7:30 AM - 4:00 PM, or you can contact us via [facility] at anytime.       If you have urgent needs after-hours, weekends, or holidays please call the hospital at [phone] and ask to speak with our on-call General Surgery Team.    Appointment [phone]  Nurse Advice (Annabelle or Keya): [phone]   Surgery Scheduler (Amber): [phone]  Billing Customer Service:  [phone]  Fax: [phone]

## 2025-02-10 NOTE — NURSING NOTE
"Chief Complaint   Patient presents with    RECHECK     Replacement of G-tube       Vitals:    02/10/25 1144   BP: 122/70   BP Location: Left arm   Patient Position: Sitting   Cuff Size: Adult Regular   Pulse: 68   SpO2: 100%   Weight: 81.1 kg (178 lb 11.2 oz)   Height: 1.778 m (5' 10\")       Body mass index is 25.64 kg/m .                          Devendra Zepeda, EMT    "

## 2025-02-11 PROCEDURE — B4036 ENTERAL FEED SUP KIT GRAV BY: HCPCS

## 2025-02-12 PROCEDURE — B4036 ENTERAL FEED SUP KIT GRAV BY: HCPCS

## 2025-02-13 PROCEDURE — B4036 ENTERAL FEED SUP KIT GRAV BY: HCPCS

## 2025-02-14 PROCEDURE — B4036 ENTERAL FEED SUP KIT GRAV BY: HCPCS

## 2025-02-15 PROCEDURE — B4036 ENTERAL FEED SUP KIT GRAV BY: HCPCS

## 2025-02-16 PROCEDURE — B4036 ENTERAL FEED SUP KIT GRAV BY: HCPCS

## 2025-02-17 ENCOUNTER — HOME INFUSION BILLING (OUTPATIENT)
Dept: HOME HEALTH SERVICES | Facility: HOME HEALTH | Age: 75
End: 2025-02-17
Payer: COMMERCIAL

## 2025-02-17 ENCOUNTER — HOME INFUSION (OUTPATIENT)
Dept: HOME HEALTH SERVICES | Facility: HOME HEALTH | Age: 75
End: 2025-02-17
Payer: COMMERCIAL

## 2025-02-17 PROCEDURE — B4152 EF CALORIE DENSE>/=1.5KCAL: HCPCS

## 2025-02-17 PROCEDURE — B4036 ENTERAL FEED SUP KIT GRAV BY: HCPCS

## 2025-02-18 PROCEDURE — B4036 ENTERAL FEED SUP KIT GRAV BY: HCPCS

## 2025-02-19 PROCEDURE — B4036 ENTERAL FEED SUP KIT GRAV BY: HCPCS

## 2025-02-20 PROCEDURE — B4036 ENTERAL FEED SUP KIT GRAV BY: HCPCS

## 2025-02-21 PROCEDURE — B4036 ENTERAL FEED SUP KIT GRAV BY: HCPCS

## 2025-02-22 PROCEDURE — B4036 ENTERAL FEED SUP KIT GRAV BY: HCPCS

## 2025-02-23 PROCEDURE — B4036 ENTERAL FEED SUP KIT GRAV BY: HCPCS

## 2025-02-24 ENCOUNTER — VIRTUAL VISIT (OUTPATIENT)
Dept: PHARMACY | Facility: CLINIC | Age: 75
End: 2025-02-24
Attending: PSYCHIATRY & NEUROLOGY
Payer: COMMERCIAL

## 2025-02-24 DIAGNOSIS — K11.7 SIALORRHEA: ICD-10-CM

## 2025-02-24 DIAGNOSIS — G20.A1 PARKINSON'S DISEASE WITHOUT FLUCTUATING MANIFESTATIONS, UNSPECIFIED WHETHER DYSKINESIA PRESENT (H): Primary | ICD-10-CM

## 2025-02-24 DIAGNOSIS — R13.12 OROPHARYNGEAL DYSPHAGIA: ICD-10-CM

## 2025-02-24 DIAGNOSIS — G47.00 INSOMNIA, UNSPECIFIED TYPE: ICD-10-CM

## 2025-02-24 DIAGNOSIS — K21.00 GASTROESOPHAGEAL REFLUX DISEASE WITH ESOPHAGITIS WITHOUT HEMORRHAGE: ICD-10-CM

## 2025-02-24 PROCEDURE — B4036 ENTERAL FEED SUP KIT GRAV BY: HCPCS

## 2025-02-24 RX ORDER — IPRATROPIUM BROMIDE 42 UG/1
2 SPRAY, METERED NASAL 2 TIMES DAILY PRN
COMMUNITY

## 2025-02-24 NOTE — Clinical Note
Patient is requesting an order for hospital bed so he can elevate the head of his bed to help with GERD. Are you able to order this?

## 2025-02-24 NOTE — PROGRESS NOTES
Medication Therapy Management (MTM) Encounter    ASSESSMENT:                            Medication Adherence/Access: See below for considerations.    Parkinson's Disease:    Stable     Insomnia:   Melatonin has been helping with patient's sleep schedule. Continue to take melatonin nightly.      GERD/dysphagia/ sialorrhea   Patient states when trying to administer his atropine drops, he cannot tell how much he is administering and is administering too much. We discussed refrigerating his atropine drops so he can better feel how much is being administered at a time. He has now had some benefit with the ipratropium spray he has continued to administer intranasally.   Patient has still had some issues with his GERD and is able to still taste his feeds in the morning if he sleeps laying down. He would like to see if he would be able to get a hospital bed for him that has the ability to let him sleep elevated to help with this. We will reach out to his provider to see if this is possible.     PLAN:                            Consider storing atropine drops in the refrigerator to help with administration so you can feel them in your mouth   Continue using ipratropium spray nasally as needed   Continue melatonin to help with sleep    We will reach out to Dr. Hill about a possible hospital bed order     Follow-up: 5/27/25 at 11:30 AM video visit    SUBJECTIVE/OBJECTIVE:                          Haresh Rock is a 74 year old male seen for a follow-up visit.       Reason for visit: medication follow up.    Allergies/ADRs: Reviewed in chart  Past Medical History: Reviewed in chart  Tobacco: He reports that he has never smoked. He has never been exposed to tobacco smoke. He has never used smokeless tobacco.  Alcohol: not currently using    Medication Adherence/Access: Medication barriers: medication administration.   8-9 am- swallow morning pills by mouth- flecainide, carbidopa-levodopa, omeprazole, metoprolol  + syringes of  water to add some liquid to it. Gets these ones down pretty easily.  He coughs to not aspirate. He has to cough between pills.   9-11 am- morning feeding for 45 minutes (at least 1 hour after morning medication)  12 pm- nap (at least 1 hour after feeding is completed) & loratadine ODT (by mouth)  2 pm- carbidopa-levodopa crushed and swallowed by mouth with syringe   3-4 pm- afternoon feeding for 45 minutes  7-7:30 pm- evening pills crushed and swallowed by mouth using a syringe - warfarin, flecainide, rosuvastatin, metoprolol, carbidopa-levodopa, and melatonin    9 pm- evening feeding     8-9AM 9-11 AM 12 PM 2 PM 3-4 PM 7 PM 9PM   Sinemet 25/100 mg 1.5 tabs   2 tabs  1 tab    Flecainide 50 mg 1 tab     1 tab    Feeding    X  X  X   Warfarin       X    Rosuvastatin 40 mg       1 tab    Atropine drops As needed   Melatonin        X   Loratadine 10 mg   1 tab       Omeprazole 20 mg 1 cap         Metoprolol 25 mg 0.5 tab     0.5 tab      Parkinson's Disease:    - carbidopa-levodopa  mg, 1.5 tablets at 8-9 am, 2 tablets at 2 pm, 1 tablet at 7-7:30 pm  - Patient has recently been able take all his medications orally. He has had changes in the specific brand of syringes he has been receiving. He has noticed some brands seem to react to the Sinemet making it harder to administer. He otherwise denies any new side effects from this medication.      Insomnia:   - Melatonin (dose unknown)  Patient states that since his last appointment he started taking melatonin and his sleep has improved. He is unsure what the dose is but states the dose he is taking is working for him. He is still having issues with waking every 1-2 hours to urinate but is able to fall back asleep more easily. He is still napping in the afternoon with occasional naps after dinner. He has been falling asleep to the calming music on the radio which has also helped.      GERD/dysphagia/ sialorrhea   - omeprazole 20 mg every morning  - Atropine drops as  needed  - Ipratropium spray nasally as needed   - Patient reports his symptoms of GERD are the highest in the morning with him tasting formula when he wakes up from that day prior. The amount depends on what position he is in and how he slept. Patient noticed that symptoms are better when he is able to sit up and is interested in getting a hospital bed for home to help with positioning.   - Patient had tried ipratropium to spray in his mouth daily to help decrease saliva. Patient didn't find this helpful but he has recently been administering this via his nose and states this has helped decrease saliva.   - Last visit, he was started on atropine drops orally as needed. He has used these drops 2-3 times since last meeting with pharmacy. When using the drops, he states he has trouble seeing/feeling how many drops his is instilling. This has caused him to have severe dry mouth with each use.      Today's Vitals: There were no vitals taken for this visit.  ----------------      I spent 19 minutes with this patient today. All changes were made via collaborative practice agreement with Dr. Hill.     A summary of these recommendations was sent via [employer].    Agnieszka Mcdonough, PharmD  PGY-2 Behavioral Health Pharmacy Resident  [facility] ([facility])    Leslie Clifford, Pharm.D.  Medication Therapy Management Pharmacist  [facility]    Telemedicine Visit Details  The patient's medications can be safely assessed via a telemedicine encounter.  Type of service:  Video Conference via [employer]  Originating Location (pt. Location): Home    Distant Location (provider location):  Off-site  Start Time:  11:34 AM  End Time:  11:53  AM     Medication Therapy Recommendations  No medication therapy recommendations to display

## 2025-02-25 DIAGNOSIS — K21.00 GASTROESOPHAGEAL REFLUX DISEASE WITH ESOPHAGITIS WITHOUT HEMORRHAGE: ICD-10-CM

## 2025-02-25 DIAGNOSIS — G20.A1 PARKINSON'S DISEASE, UNSPECIFIED WHETHER DYSKINESIA PRESENT, UNSPECIFIED WHETHER MANIFESTATIONS FLUCTUATE (H): Primary | ICD-10-CM

## 2025-02-25 PROCEDURE — B4036 ENTERAL FEED SUP KIT GRAV BY: HCPCS

## 2025-02-25 NOTE — PATIENT INSTRUCTIONS
"Recommendations from today's MTM visit:                                                      Consider storing atropine drops in the refrigerator to help with administration so you can feel them in your mouth   Continue using ipratropium spray nasally as needed   Continue melatonin to help with sleep   We will reach out to Dr. Hill about a possible hospital bed order     Follow-up: 5/27/25 at 11:30 AM video visit    It was great speaking with you today.  I value your experience and would be very thankful for your time in providing feedback in our clinic survey. In the next few days, you may receive an email or text message from [employer] with a link to a survey related to your  clinical pharmacist.\"     To schedule another MTM appointment, please call the clinic directly or you may call the MTM scheduling line at [phone].    My Clinical Pharmacist's contact information:                                                      Please feel free to contact me with any questions or concerns you have.      Leslie Clifford, Pharm.D.  Medication Therapy Management Pharmacist  [facility]     "

## 2025-02-26 PROCEDURE — B4036 ENTERAL FEED SUP KIT GRAV BY: HCPCS

## 2025-02-27 PROCEDURE — B4036 ENTERAL FEED SUP KIT GRAV BY: HCPCS

## 2025-02-28 PROCEDURE — B4036 ENTERAL FEED SUP KIT GRAV BY: HCPCS

## 2025-03-01 PROCEDURE — B4036 ENTERAL FEED SUP KIT GRAV BY: HCPCS

## 2025-03-02 PROCEDURE — B4036 ENTERAL FEED SUP KIT GRAV BY: HCPCS

## 2025-03-03 PROCEDURE — B4036 ENTERAL FEED SUP KIT GRAV BY: HCPCS

## 2025-03-04 PROCEDURE — B4036 ENTERAL FEED SUP KIT GRAV BY: HCPCS

## 2025-03-05 PROCEDURE — B4036 ENTERAL FEED SUP KIT GRAV BY: HCPCS

## 2025-03-06 PROCEDURE — B4036 ENTERAL FEED SUP KIT GRAV BY: HCPCS

## 2025-03-07 PROCEDURE — B4036 ENTERAL FEED SUP KIT GRAV BY: HCPCS

## 2025-03-08 PROCEDURE — B4036 ENTERAL FEED SUP KIT GRAV BY: HCPCS

## 2025-03-09 PROCEDURE — B4036 ENTERAL FEED SUP KIT GRAV BY: HCPCS

## 2025-03-10 PROCEDURE — B4036 ENTERAL FEED SUP KIT GRAV BY: HCPCS

## 2025-03-11 PROCEDURE — B4036 ENTERAL FEED SUP KIT GRAV BY: HCPCS

## 2025-03-12 PROCEDURE — B4036 ENTERAL FEED SUP KIT GRAV BY: HCPCS

## 2025-03-13 PROCEDURE — B4036 ENTERAL FEED SUP KIT GRAV BY: HCPCS

## 2025-03-14 ENCOUNTER — HOME INFUSION (OUTPATIENT)
Dept: HOME HEALTH SERVICES | Facility: HOME HEALTH | Age: 75
End: 2025-03-14
Payer: COMMERCIAL

## 2025-03-14 PROCEDURE — B4036 ENTERAL FEED SUP KIT GRAV BY: HCPCS

## 2025-03-15 PROCEDURE — B4036 ENTERAL FEED SUP KIT GRAV BY: HCPCS

## 2025-03-16 PROCEDURE — B4036 ENTERAL FEED SUP KIT GRAV BY: HCPCS

## 2025-03-17 ENCOUNTER — HOME INFUSION BILLING (OUTPATIENT)
Dept: HOME HEALTH SERVICES | Facility: HOME HEALTH | Age: 75
End: 2025-03-17
Payer: COMMERCIAL

## 2025-03-17 PROCEDURE — B4036 ENTERAL FEED SUP KIT GRAV BY: HCPCS

## 2025-03-17 PROCEDURE — B4152 EF CALORIE DENSE>/=1.5KCAL: HCPCS

## 2025-03-18 PROCEDURE — A6402 STERILE GAUZE <= 16 SQ IN: HCPCS

## 2025-03-18 PROCEDURE — B4036 ENTERAL FEED SUP KIT GRAV BY: HCPCS

## 2025-03-19 PROCEDURE — B4036 ENTERAL FEED SUP KIT GRAV BY: HCPCS

## 2025-03-20 PROCEDURE — B4036 ENTERAL FEED SUP KIT GRAV BY: HCPCS

## 2025-03-21 PROCEDURE — B4036 ENTERAL FEED SUP KIT GRAV BY: HCPCS

## 2025-03-22 PROCEDURE — B4036 ENTERAL FEED SUP KIT GRAV BY: HCPCS

## 2025-03-23 PROCEDURE — B4036 ENTERAL FEED SUP KIT GRAV BY: HCPCS

## 2025-03-24 PROCEDURE — B4036 ENTERAL FEED SUP KIT GRAV BY: HCPCS

## 2025-03-25 PROCEDURE — B4036 ENTERAL FEED SUP KIT GRAV BY: HCPCS

## 2025-03-26 PROCEDURE — B4036 ENTERAL FEED SUP KIT GRAV BY: HCPCS

## 2025-03-27 PROCEDURE — B4036 ENTERAL FEED SUP KIT GRAV BY: HCPCS

## 2025-03-28 PROCEDURE — B4036 ENTERAL FEED SUP KIT GRAV BY: HCPCS

## 2025-03-29 LAB — INR HOME MONITORING: 2.5 RATIO (ref 2–3)

## 2025-03-29 PROCEDURE — B4036 ENTERAL FEED SUP KIT GRAV BY: HCPCS

## 2025-03-30 PROCEDURE — B4036 ENTERAL FEED SUP KIT GRAV BY: HCPCS

## 2025-03-31 ENCOUNTER — ANTICOAGULATION THERAPY VISIT (OUTPATIENT)
Dept: ANTICOAGULATION | Facility: CLINIC | Age: 75
End: 2025-03-31
Payer: COMMERCIAL

## 2025-03-31 DIAGNOSIS — Z79.01 LONG TERM CURRENT USE OF ANTICOAGULANT THERAPY: Primary | ICD-10-CM

## 2025-03-31 DIAGNOSIS — Z86.711 PERSONAL HISTORY OF PE (PULMONARY EMBOLISM): ICD-10-CM

## 2025-03-31 DIAGNOSIS — I48.0 PAROXYSMAL ATRIAL FIBRILLATION (H): ICD-10-CM

## 2025-03-31 PROCEDURE — B4036 ENTERAL FEED SUP KIT GRAV BY: HCPCS

## 2025-03-31 NOTE — PROGRESS NOTES
ANTICOAGULATION MANAGEMENT     Haresh Rock 74 year old male is on warfarin with therapeutic INR result. (Goal INR 2.0-3.0)    Recent labs: (last 7 days)     03/29/25  1558   INR 2.5       ASSESSMENT     Source(s): Chart Review and Patient/Caregiver Call     Warfarin doses taken: Warfarin taken as instructed  Diet: No new diet changes identified  Medication/supplement changes: None noted  New illness, injury, or hospitalization: No  Signs or symptoms of bleeding or clotting: No  Previous result: Therapeutic last visit; previously outside of goal range  Additional findings: None       PLAN     Recommended plan for no diet, medication or health factor changes affecting INR     Dosing Instructions: Continue your current warfarin dose with next INR in 1 week       Summary  As of 3/31/2025      Full warfarin instructions:  3.75 mg every Sun, Thu; 2.5 mg all other days   Next INR check:  4/4/2025               Telephone call with Haresh who verbalizes understanding and agrees to plan    Patient to recheck with home meter    Education provided: Contact [phone] with any changes, questions or concerns.     Plan made per [facility] anticoagulation protocol    Janice Head RN  3/31/2025  Anticoagulation Clinic  Cross[employer] for routing messages: p ANTICOAG HOME MONITORING  [facility] patient phone line: [phone]        _______________________________________________________________________     Anticoagulation Episode Summary       Current INR goal:  2.0-3.0   TTR:  86.3% (1 y)   Target end date:  Indefinite   Send INR reminders to:  ANTICOAG HOME MONITORING    Indications    Long-term (current) use of anticoagulants [Z79.01] [Z79.01]  Atrial fibrillation (H) (Resolved) [I48.91]  Antiphospholipid antibody syndrome (Resolved) [D68.61]  Personal history of DVT (deep vein thrombosis) (Resolved) [Z86.718]  Atrial fibrillation  unspecified type (H) (Resolved) [I48.91]  Paroxysmal atrial fibrillation (H) [I48.0]  Chronic atrial  fibrillation (H) (Resolved) [I48.20]  Personal history of PE (pulmonary embolism) [Z86.711]             Comments:  JESSICA okay to manage by exception             Anticoagulation Care Providers       Provider Role Specialty Phone number    Anya Nowak MD Referring Family Medicine [phone]    Elizabeth Balderas MD Referring HOSPITALIST [phone]

## 2025-04-01 PROCEDURE — B4036 ENTERAL FEED SUP KIT GRAV BY: HCPCS

## 2025-04-02 PROCEDURE — B4036 ENTERAL FEED SUP KIT GRAV BY: HCPCS

## 2025-04-03 PROCEDURE — B4036 ENTERAL FEED SUP KIT GRAV BY: HCPCS

## 2025-04-04 PROCEDURE — B4036 ENTERAL FEED SUP KIT GRAV BY: HCPCS

## 2025-04-05 PROCEDURE — B4036 ENTERAL FEED SUP KIT GRAV BY: HCPCS

## 2025-04-06 PROCEDURE — B4036 ENTERAL FEED SUP KIT GRAV BY: HCPCS

## 2025-04-07 PROCEDURE — B4036 ENTERAL FEED SUP KIT GRAV BY: HCPCS

## 2025-04-08 PROBLEM — K21.9 GERD (GASTROESOPHAGEAL REFLUX DISEASE): Status: ACTIVE | Noted: 2025-04-06

## 2025-04-08 PROCEDURE — B4036 ENTERAL FEED SUP KIT GRAV BY: HCPCS

## 2025-04-09 PROCEDURE — B4036 ENTERAL FEED SUP KIT GRAV BY: HCPCS

## 2025-04-10 PROCEDURE — B4036 ENTERAL FEED SUP KIT GRAV BY: HCPCS

## 2025-04-11 ENCOUNTER — DOCUMENTATION ONLY (OUTPATIENT)
Dept: ANTICOAGULATION | Facility: CLINIC | Age: 75
End: 2025-04-11
Payer: COMMERCIAL

## 2025-04-11 DIAGNOSIS — Z86.711 PERSONAL HISTORY OF PE (PULMONARY EMBOLISM): ICD-10-CM

## 2025-04-11 DIAGNOSIS — I48.0 PAROXYSMAL ATRIAL FIBRILLATION (H): Primary | ICD-10-CM

## 2025-04-11 DIAGNOSIS — Z79.01 LONG TERM CURRENT USE OF ANTICOAGULANT THERAPY: ICD-10-CM

## 2025-04-11 PROCEDURE — B4036 ENTERAL FEED SUP KIT GRAV BY: HCPCS

## 2025-04-11 NOTE — PROGRESS NOTES
ANTICOAGULATION CLINIC REFERRAL RENEWAL REQUEST       An annual renewal order is required for all patients referred to [facility].?  Please review and sign the pended referral order for Haresh Rock.       ANTICOAGULATION SUMMARY      Warfarin indication(s)   Atrial Fibrillation, PE, and Antiphospholipid Antibodies    Mechanical heart valve present?  NO       Current goal range   INR: 2.0-3.0     Goal appropriate for indication? Goal INR 2-3, standard for indication(s) above     Time in Therapeutic Range (TTR)  (Goal > 60%) 85.6%       Office visit with referring provider's group within last year yes on 2/5/25       Allyn Doe, RN  [facility]

## 2025-04-12 PROCEDURE — B4036 ENTERAL FEED SUP KIT GRAV BY: HCPCS

## 2025-04-13 PROCEDURE — B4036 ENTERAL FEED SUP KIT GRAV BY: HCPCS

## 2025-04-14 ENCOUNTER — OFFICE VISIT (OUTPATIENT)
Dept: FAMILY MEDICINE | Facility: CLINIC | Age: 75
End: 2025-04-14
Payer: COMMERCIAL

## 2025-04-14 VITALS
SYSTOLIC BLOOD PRESSURE: 95 MMHG | OXYGEN SATURATION: 97 % | DIASTOLIC BLOOD PRESSURE: 59 MMHG | WEIGHT: 173.6 LBS | BODY MASS INDEX: 24.85 KG/M2 | HEIGHT: 70 IN | TEMPERATURE: 98.4 F | RESPIRATION RATE: 18 BRPM | HEART RATE: 69 BPM

## 2025-04-14 DIAGNOSIS — Z85.71 HISTORY OF HODGKIN'S LYMPHOMA: ICD-10-CM

## 2025-04-14 DIAGNOSIS — J69.0 ASPIRATION PNEUMONIA OF LEFT LOWER LOBE, UNSPECIFIED ASPIRATION PNEUMONIA TYPE (H): Primary | ICD-10-CM

## 2025-04-14 DIAGNOSIS — I48.0 PAROXYSMAL ATRIAL FIBRILLATION (H): ICD-10-CM

## 2025-04-14 DIAGNOSIS — Z93.1 GASTROSTOMY TUBE IN PLACE (H): ICD-10-CM

## 2025-04-14 DIAGNOSIS — N18.2 TYPE 2 DIABETES MELLITUS WITH STAGE 2 CHRONIC KIDNEY DISEASE, WITHOUT LONG-TERM CURRENT USE OF INSULIN (H): ICD-10-CM

## 2025-04-14 DIAGNOSIS — Z94.81 BONE MARROW TRANSPLANT STATUS (H): ICD-10-CM

## 2025-04-14 DIAGNOSIS — K74.60 CIRRHOSIS OF LIVER NOT DUE TO ALCOHOL (H): ICD-10-CM

## 2025-04-14 DIAGNOSIS — G20.B1 PARKINSON'S DISEASE WITH DYSKINESIA, UNSPECIFIED WHETHER MANIFESTATIONS FLUCTUATE (H): ICD-10-CM

## 2025-04-14 DIAGNOSIS — D69.6 THROMBOCYTOPENIA: ICD-10-CM

## 2025-04-14 DIAGNOSIS — E11.22 TYPE 2 DIABETES MELLITUS WITH STAGE 2 CHRONIC KIDNEY DISEASE, WITHOUT LONG-TERM CURRENT USE OF INSULIN (H): ICD-10-CM

## 2025-04-14 DIAGNOSIS — Z23 NEED FOR SHINGLES VACCINE: ICD-10-CM

## 2025-04-14 DIAGNOSIS — Z23 NEED FOR PROPHYLACTIC VACCINATION AGAINST HEPATITIS A: ICD-10-CM

## 2025-04-14 DIAGNOSIS — E78.5 HYPERLIPIDEMIA LDL GOAL <70: ICD-10-CM

## 2025-04-14 DIAGNOSIS — D89.811 GRAFT-VERSUS-HOST DISEASE, CHRONIC (H): ICD-10-CM

## 2025-04-14 DIAGNOSIS — E83.110 HEREDITARY HEMOCHROMATOSIS: ICD-10-CM

## 2025-04-14 LAB
ERYTHROCYTE [DISTWIDTH] IN BLOOD BY AUTOMATED COUNT: 11.7 % (ref 10–15)
HCT VFR BLD AUTO: 44.6 % (ref 40–53)
HGB BLD-MCNC: 14.8 G/DL (ref 13.3–17.7)
MCH RBC QN AUTO: 33.5 PG (ref 26.5–33)
MCHC RBC AUTO-ENTMCNC: 33.2 G/DL (ref 31.5–36.5)
MCV RBC AUTO: 101 FL (ref 78–100)
PLATELET # BLD AUTO: 102 10E3/UL (ref 150–450)
RBC # BLD AUTO: 4.42 10E6/UL (ref 4.4–5.9)
WBC # BLD AUTO: 6.5 10E3/UL (ref 4–11)

## 2025-04-14 PROCEDURE — 99214 OFFICE O/P EST MOD 30 MIN: CPT | Mod: 25 | Performed by: FAMILY MEDICINE

## 2025-04-14 PROCEDURE — G2211 COMPLEX E/M VISIT ADD ON: HCPCS | Performed by: FAMILY MEDICINE

## 2025-04-14 PROCEDURE — 91320 SARSCV2 VAC 30MCG TRS-SUC IM: CPT | Performed by: FAMILY MEDICINE

## 2025-04-14 PROCEDURE — 36415 COLL VENOUS BLD VENIPUNCTURE: CPT | Performed by: FAMILY MEDICINE

## 2025-04-14 PROCEDURE — 85027 COMPLETE CBC AUTOMATED: CPT | Performed by: FAMILY MEDICINE

## 2025-04-14 PROCEDURE — 82043 UR ALBUMIN QUANTITATIVE: CPT | Performed by: FAMILY MEDICINE

## 2025-04-14 PROCEDURE — B4036 ENTERAL FEED SUP KIT GRAV BY: HCPCS

## 2025-04-14 PROCEDURE — 3074F SYST BP LT 130 MM HG: CPT | Performed by: FAMILY MEDICINE

## 2025-04-14 PROCEDURE — 90480 ADMN SARSCOV2 VAC 1/ONLY CMP: CPT | Performed by: FAMILY MEDICINE

## 2025-04-14 PROCEDURE — 3078F DIAST BP <80 MM HG: CPT | Performed by: FAMILY MEDICINE

## 2025-04-14 PROCEDURE — 1111F DSCHRG MED/CURRENT MED MERGE: CPT | Performed by: FAMILY MEDICINE

## 2025-04-14 PROCEDURE — 82570 ASSAY OF URINE CREATININE: CPT | Performed by: FAMILY MEDICINE

## 2025-04-14 RX ORDER — IPRATROPIUM BROMIDE 42 UG/1
SPRAY, METERED NASAL
Qty: 15 ML | Refills: 11 | Status: SHIPPED | OUTPATIENT
Start: 2025-04-14

## 2025-04-14 NOTE — PROGRESS NOTES
Assessment & Plan     Aspiration pneumonia of left lower lobe, unspecified aspiration pneumonia type (H)  Gastrostomy tube in place (H)  Parkinson's disease with dyskinesia, unspecified whether manifestations fluctuate (H)  Follow-up CXR recommended in 2 - 3 weeks. Follow-up with neurology as planned.   - ipratropium (ATROVENT) 0.06 % nasal spray; Administer one to 2 sprays in mouth once or twice daily    Paroxysmal atrial fibrillation (H)  Rate controlled and anticoagulated     Type 2 diabetes mellitus with stage 2 chronic kidney disease, without long-term current use of insulin (H)  Diet controlled   - Albumin Random Urine Quantitative with Creat Ratio; Future  - CBC with Platelets; Future    Hyperlipidemia LDL goal <70  Well controlled with medications without side effects.     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    Need for shingles vaccine  - zoster vaccine recombinant adjuvanted (SHINGRIX) injection; Inject 0.5 mLs into the muscle once for 1 dose. Pharmacist administered        MED REC REQUIRED   Post Medication Reconciliation Status: discharge medications reconciled, continue medications without change    Return in about 9 months (around 1/13/2026) for Wellness visit, diabetes (previsit labs up to one week prior).    Arsh Hutson is a 74 year old, presenting for the following health issues:  Hospital F/U ([place] 4/6/25-4/8/25)        4/14/2025     2:55 PM   Additional Questions   Roomed by Deborah RAMIREZ CMA   Accompanied by Wife         4/14/2025     2:55 PM   Patient Reported Additional Medications   Patient reports taking the  "following new medications None     HPI          Hospital Follow-up Visit:    Hospital/Nursing Home/IP Rehab Facility:  [facility]  Date of Admission: 4/6/25  Date of Discharge: 4/8/25  Reason(s) for Admission: Pneumonia  Was the patient in the ICU or did the patient experience delirium during hospitalization?  No  Do you have any other stressors you would like to discuss with your provider? No    Problems taking medications regularly:  None  Medication changes since discharge: None  Problems adhering to non-medication therapy:  Not taking any pills by mouth. Needs a change to omeprazole. Has a hard time taking the atropine and how many drops he is getting.    Summary of hospitalization:  CareEverywhere information obtained and reviewed  Diagnostic Tests/Treatments reviewed.  Follow up needed: none  Other Healthcare Providers Involved in Patient s Care:         Specialist appointment - hematology, neurology and MTM  Update since discharge: improved.         Plan of care communicated with patient and family                       Objective    BP 95/59 (BP Location: Left arm, Patient Position: Sitting, Cuff Size: Adult Regular)   Pulse 69   Temp 98.4  F (36.9  C) (Temporal)   Resp 18   Ht 1.785 m (5' 10.28\")   Wt 78.7 kg (173 lb 9.6 oz)   SpO2 97%   BMI 24.71 kg/m    Body mass index is 24.71 kg/m .  Physical Exam   GENERAL: alert and no distress  EYES: Eyes grossly normal to inspection, PERRL and conjunctivae and sclerae normal  NECK: no adenopathy, no asymmetry, masses, or scars  RESP: lungs clear to auscultation - no rales, rhonchi or wheezes  CV: regular rate and rhythm, normal S1 S2, no S3 or S4, no murmur, click or rub, no peripheral edema  MS: extremities normal- no gross deformities noted  NEURO: mentation intact; flat faces, soft speech, rigid gait   PSYCH: mentation appears normal, affect normal     Orders Only on 04/11/2025   Component Date Value Ref Range Status    INR HOME MONITORING 04/11/2025 1.5 (L)  2 " - 3 RATIO Final    RCS - IVR Result From Phone #: [phone]     No results found. However, due to the size of the patient record, not all encounters were searched. Please check Results Review for a complete set of results.        Signed Electronically by: Anya Nowak MD

## 2025-04-15 LAB
CREAT UR-MCNC: 131 MG/DL
MICROALBUMIN UR-MCNC: 86.8 MG/L
MICROALBUMIN/CREAT UR: 66.26 MG/G CR (ref 0–17)

## 2025-04-15 PROCEDURE — B4036 ENTERAL FEED SUP KIT GRAV BY: HCPCS

## 2025-04-15 NOTE — RESULT ENCOUNTER NOTE
David Hutson,    You have a small of protein in your urine, which in some cases represents early effects of blood glucose or high blood pressure on the kidneys.      Your platelet count is stable. No anemia.     Anya Nowak MD

## 2025-04-16 PROCEDURE — B4036 ENTERAL FEED SUP KIT GRAV BY: HCPCS

## 2025-04-16 NOTE — PROGRESS NOTES
Diagnosis/Summary/Recommendations:    PATIENT: Haresh Rock  74 year old male     : 1950    KEN: 2025       MRN: [id number]  [address] [place] [address]  [phone] (M)  [phone] (H)  [email or web address]     - sheyla Ramirez - son  Gemma - daughter  Sheyla Rock  [phone]     [email or web address]  [email or web address]     [phone]     Sitting in a lazy boy     [phone]     Assessment:  (G20) Parkinson disease (H)  (primary encounter diagnosis)     Dopamine scan - (datscan)  11/3/2020  A presynaptic dopaminergic deficit is demonstrated bilaterally.     Review of diagnosis    parkinson  Avoidance of dopamine blockers yes  Motor complication review  -   Review of Impulse control disorders no  Review of surgical or medication options yes     Avoidance of dopamine blockers   Not ta josiane     Motor complication review   no     Review of Impulse control disorders   denies     Review of surgical or medication options   reviewed     Gait/Balance/Falls   No falls     Exercise/Therapy performed/offered   Doing shoInsync Systemsing snow     Cognitive/Driving   Driving - not problems  No cognitive problems - no changes        Mood   Daughter gemma with bipolar - 2 or 3 miles away   Son july with mood issues  2 or 3 miles away   He has some depression     Living with Sheyla - has ongoing back pain - getting a tooth extracted this afternoon;  Constipation, heart and bladder issues.   Constipation or diarrhea - has problems controlling her bowels     Hallucinations/delusions   No hallucinations     Sleep   Pseudoeph-doxylamine DM APAP nyquil - not using  Sleep pretty well  Nocturia 2/noc  Falls asleep during the day  Napping during the day  Falls asleep when watching news  Falls asleep during the afternoon  Up 3/noc for bathroom   Midnight goes to bed and up at 8am  Naps during day   Not talking in sleep or snoring.  No unusual behaviors  Wakes up before 3am, 5/6a and 7am      Bladder/Renal/Prostate/Gyn/Other  Renal function.   May have stage 2 disease.      Prostate - denies problems. He has has nocturia couple times per night.   Denies elevated psa     Prostate surgery; urinating better     Dutasteride avodart 0.5mg - off   Tamsulosin flomax 0.4mg - stopped  Had low blood pressure and fluids     July 5th ED visit and got catheter  Surgery yesterday laser and should get catheter out  Dr Jose G Hawley     GI/Constipation/GERD   Swallow study 9/22/2020  1. Multiple events of deep laryngeal penetration with thinner barium consistencies with intermittent aspiration.      Gallstones but has not gallbladder removed.  Had a gallbladder attack x 1 and is not on actigall     Bisacodyl dulcolax 5mg EC tablet - not using   Calcium carbonate tums 500mg ?  Nutritional supplement - 1 bottle per day  - not using   Pantoprazole protonix 20mg - not using  Polythyelene glycol miralax - not using   Prevident 5000 dry mouth - using  Senna-docusate senexon-S senokot-S  - no  Weight loss better with feeding tube  No constipation  Has some seepage  When urinates he has some stool     Had bloating and blockage with the feeding tube placement and had emergency surgery   10/29/2021 Scan showed problems     Feeding tube has helped him gain 20 lbs - he gets feedings 1 hour after medications so 9am, 3pm and 9pm     Takes parkinson medications by mouth for the first two doses and crush the last one of the day through the feeding tube  Schedule is 8am, 2pm and 8pm     With his fecal seepage discussed the use of a bidet or adapting his toilet with one - h e has a raised toilet seat so not clear what needs to be done. Consider seeing Occupational therapy.      Formula switched from abbott to nestle  161 or so lbs        ENDO/Lipid/DM/Bone density/Thyroid  Cholesterol  Rosuvastatin crestor 40mg   Vitamin D3 cholecalciferol 50 mcg 2000 units  Had diabetes in the past from prednisone  And this may have resolved  May have  a thyroid nodule.  TSH was normal.   A1c was 5.5         Cardio/heart/Hyper or Hypotensive   Flecainide tambocor 50mg twice daily  Metoprolol succinate ER Toprol XL 25mg 24 hr tablet  Blood pressure has been okay   122/63  Low bp has cleared up  MRI of heart next week     Vision/Dry Eyes/Cataracts/Glaucoma/Macular   Wears glasses  Eye - left eye has been poor since age 2 due to a lazy eye - amblyopia  Right eye post cataract surgery had an artificial lens put in and does not wear glasses till the evening         Heme/Anticoagulation/Antiplatelet/Anemia/Other  Folic acid folvite 1mg - not taking   Anticoagulated  Warfarin     Hereditary hemochromatosis gene - gene that increases his risk and has not had problems with this. Had had phlebotomies x 2 in the past.      AYUSH Torres     History of pulmonary embolii - back in 2004. This was related to blood clotting problems.   May have had a DVT. Reportedly has had antiphospholipid antibody. He is on coumadin.      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 Had a transplant from Dr. 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  No problems since then.      Graft vs host     He has high sed rate and will be seeing ID next week   crp was 12.8  Sed rate was 90  8/3/2021  Working with Marcelo Jacques     ENT/Resp  Hodgkin's lymphoma in the past 2011 and had chemotherapy for this and followed with CT scan and reportedly this is gone.      Pulmonary hypertension in the past.   Fluticasone flonase 50 mcg/act nasal spray  Loratadine claritin 10mg    CT of lung next      Hearing. Feels he has wax in his left ear and partial problem with the right ear. It is variable problem. Has used ear wax removal.      Feeding tube has helped him gain 20 lbs - he gets feedings 1 hour after medications so 9am, 3pm and 9pm     Skin/Cancer/Seborrhea/other  No skin cancer     Musculoskeletal/Pain/Headache  bilateral knee replacements.     Other:  He  "reports a neuropathy and that his foot drop has resolved. 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     No constipation - bowels are a bit loose  Getting feedings - using nestle product  He has reflux -  Has a pillow but not a pillow wedge  He has to brush his teeth several times daily  Avoiding water pik because of difficulty handling secretions   Teeth gets crusty - tartar     May want to talk with his pcp about the omeprazole - should he be taking a different product or/and higher dose  Famotidine (pepcid) or Pantoprazole (protonix)     Overall his parkinson is stable  No falls  [facility] his [facility]em[facility]     Ongoing nocturia - had tried medication in the past that did not help  Not sure if he has been on mirabegron/myrbetriq, ubegron/gemtesa  Has seen Dr. Hawley jan 2023   Benign prostatic hyperplasia with urinary retention  (primary encounter diagnosis)  Comment: Bladder scan showed minimal residual urine today.  (N45.1) Epididymitis     Blood pressure has been stable  Anticoagulated   Using flecainide for his heart     Ongoing hematological care with Dr. JUNIOR     Not exercising  No falls  He arrives today by himself  He had an ulcer left foot - was wearing a boot and was off balance and gave up on it and was healed   He has a neuropathy and had a stick that stuck there that he was unaware of - and a woman who does his toes identified it and sent him in to see his doctor     He has not noticed wearing off - taking his medication 3/day      Ordered big and loud t herapy =      Swallowing - he had some problems with tylenol caplet and coughed it up as he had problems swallowing it.   He got one tylenol down and was not able to get the other pill down  He has a lot of  \"reflux\" due to the osmolyte formula  He has some cramping with his bowels - some gas  Bowels are explosive and loose and every few days  He has some stool every day and is loose  Not well formed - more " "\"pelletized\"     He is urinating 4-5 times per night     He is up during the night  He has achiness of his hip - he gets up and this helps his hip pain.      He has a new cat that sleeps on his bed and wakes him up -   Feeds her before  Sleeps during the day and up at night   Cat was scared initially when they got the cat from shelter in [place]      Blood pressure has been stable  Anticoagulated   Using flecainide for his heart  Metoprolol      Angelica Rock - wife - bladder surgery and urinary issues - suprapubic catheter     James - son - working in [place] and has house in [facility]  - trying to get a local job.  Working on boost service.   Gemma - daughter - lives in  [place] and working with [employer] and immigration policy     Blood work today  Paul tomorrow virtual     Bekah Matt appointment cardiology 10/10     Covid Pizer 11/6/2023 - needs to be cancelled as got booster already      Return back 6 months.      Completed PT, SLP and OT and found the OT the most helpful as he had done PT and SLP in the past and had a bit of repetition to it and plateaued from benefit.      Taking his sinemet by mouth int he morning and swallows the 2nd dose in the afternoon and crushes his evening dose with other medications and puts in the tube.      He has more tremor and use to be just the right hand and now the left hand as well in the evening watching tv  Discussed protein and sinemet interaction and he is presently taking his medication usually 1 hour  before food.   His shaking is before his evening pills which he takes at 8pm. He has some double tapping on his phone and keyboard.      No falls but when he gets up in the middle of the night he is tipsy and he is careful.      He has urinary frequency. Seen urologist last year. Jose G Hawley  He has not been on medication for his bladder  He has not been on mirabegron/myrbetriq  He has not been on gemtesa/ubegron  As best I can determine.    "   Pharmacy (MTM) consultation and medication management     Flecainide tambocor 50mg  Metoprolol tartrate 25mg   Blood pressure was 105/61 and heart rate was 67 today  He has had some light headedness but his readings are not low from his phone on review today.      6/4/2024 abdominal ultrasound  6/11/2024 return visit to see Dr. Miller and blood work   Transplant 2007  Lymphoma in the past  He is anticoagulated      He has not had choking but has some reflux and is always taste things. He does not have much appetite.   He is using a formula 2 cans 3 times per day  osmolyte   Taking omeprazole in the morning.   He rarely uses the prevident as he has swallowing problems and is easier without anything on his brush  He is getting his teeth cleaned every 4 months  He is taking claritin daily      Bowels are pretty - firm to loose and not taking anything and has variable where some days he is loose and other days he is not having a bowel movement for a few days.      Sleep is stable. He sleeps separately from his wife who has cpap and a suprapubic and he listens to [facility] radio. He listens to NPR, CBC and [employer]. ?hallucinations - tactile at night vs muscle twitching.      Right hip pain and sleeping on the right side - not having daytime pain  Right leg is weaker.     Angelica Rock - wife - bladder surgery and urinary issues - suprapubic catheter     James - son - working in [place] and has house in [facility]  - trying to get a local job.  Working on boost service.   Gemma - daughter - lives in  [place] and working with [employer] and immigration policy     Consider taking 1.5 tabs of sinemet at 2pm  Consider parcopa 25/100 1/2 tab as needed in the evening - this dissolves in your mouth. - pharmacist lizeth bocanegra says not available  Pharmacy input - Pharmacy (MTM) consultation and medication management  Please call the scheduling number @ [phone] to set up an appointment with pharmacists Leslie  Darrin or Pricila Saleem.   4. Return appointment  - last visit was 10/9/2023 and return in 6 months.   5. Bladder issues ?mirabegron      He has an apple watch - and monitoring his sleep  He is sleeping at night to a radio and not sure if impacting his apple watch  He sleeps during the day and not sleeping at night.      He ha more speech issues - but will try to do the exercises  He has drooling at night. He is willing to try ipatropium nasal spray once or twice daily for drooling.   Denies pneumonia      Urinates every 1 or 1.5 hours. This is an issue.   He had tried flomax w hich did not help.   He has not been on mirabron/myrbetriq (tier 3/5) or gemtesa/ubegron (not on formulary)  He has not seen urology recently  Had a TURP in the past  Major issue is the urinary frequency.      He may be having illusions - seeing something out of the corner of his left eye.   His left eye is bad. No change in his Rx.      Cholesterol   Rosuvastatin crestor 40mg       Recent Labs   Lab Test 01/17/24  1428 10/11/22  1553   CHOL 95 92   HDL 33* 39*   LDL 34 40   TRIG 141 66      Flecainide tambocor 50mg twice daily   Metoprolol tartrate 25mg 1/2 twice daily   Blood pressure was 110/68 and heart rate was 71  Has future appointments with cardiology including and Echocardiogram     He has some balance issues and not climbing ladder to change light bulbs.   He has had 3 falls in the past month. Holding onto laundry basket and holding on to rail on stairs and missed a step and fell 5 steps from the bottom and landed on basket and knees and elbows hit the concrete. He is anticoagulated.   #2 Had a fall when not holding onto the rail.  #3 He had a fall taking out recycling - fell on a pile of boxes and had problems getting up. He is trying to be safer. He carries his feeding back up in the living room and may need to do a separate trip or have his wife help him out. He watches tv while getting his feedings.      Carbidopa/levodopa  Sinemet 25/100 he is on 1  - 1.5 - 1   He has noticed some problems in the afternoon around 2 pm   [facility]   He may take 1 to 1.5 in the morning and 1.5 to 2 at 2pm and 1 at night      He is using the osmolyte 3 times per day  Fv was calling today during the visit about his feeding supplies.   His weight has been stable. He is on 2 cans 3/day     Angelica Rock - wife - bladder surgery and urinary issues - suprapubic catheter changed every month and still has incontinence around her urethra and has a pain pump for back pain. She was able to make a wedding in [place] last week with son James.  Sung stayed home.       James - son - working in [place] and has house in [place]  - working from [employer] and had ended contract and may be working for a [place] company remotely.      Gemma - daughter - lives in  [place] and working with [employer]/food share program nonprofit and immigration policy - she may work as ASL. She has her mood issues.      Sees Hematology every 6 months - followed by Melissa for 17 or 19 years for his lymphoma   He is on coumadin/anticoagulated        Sed Rate   Date Value Ref Range Status   06/08/2021 106 (H) 0 - 20 mm/h Final            Erythrocyte Sedimentation Rate   Date Value Ref Range Status   06/12/2024 71 (H) 0 - 20 mm/hr Final            CRP Inflammation   Date Value Ref Range Status   06/12/2024 11.60 (H) <5.00 mg/L Final      Ferritin was 149 as of 6/12/2024        Ferritin   Date Value Ref Range Status   06/12/2024 149 31 - 409 ng/mL Final   02/15/2021 268 26 - 388 ng/mL Final            Iron   Date Value Ref Range Status   06/12/2024 122 61 - 157 ug/dL Final   02/15/2021 78 35 - 180 ug/dL Final            Iron Binding Cap   Date Value Ref Range Status   02/15/2021 227 (L) 240 - 430 ug/dL Final            Iron Binding Capacity   Date Value Ref Range Status   06/12/2024 231 (L) 240 - 430 ug/dL Final    04/05/2022 255 240 - 430 ug/dL Final         - The patient has a medical condition which requires positioning of the body in ways not feasible with an ordinary bed, and the patient requires positioning of the body in ways not feasible with an ordinary bed in order to alleviate pain, and the patient requires the head of the bed to be elevated more than 30 degrees most of the time due to heart failure, chronic pulmonary disease, or problems with aspiration, OR the patient requires traction equipment, which only be attached to a hospital bed.      This latter issue is most relevant due to his tube feedings.      In summary  Small change in sinemet  Urology consultation for possible trial of mirabegron  Return in 6months  Ipatropium for drooling      Medications      9a   2p  3p 730p  9p   Amoxicillin amoxil 500mg dentist             Atropine 1% opthal mouth         Carbidopa/levodopa Sinemet 25/100 1.5   2   1     Flecainide tambocor 50mg 1       1     Ipratropium atrovent nasal & mouth         Loratadine claritin 10mg ODT 1             Melatonin liquid      4gtt   Metoprolol tartrate 25mg  1/2        1/2      MVI centrum off             Nonform feeding tube osmolyte   2    2    1   Omeprazole prilosec 20mg 1             Prevident 5000 dry mouth rare             Rosuvastatin crestor 40mg         1     Senna-docusate 8.6-50 no             Starch-maltodextrin thick-it no             Vit D3 cholecalciferol 50 2000  off             Warfarin anticoag coumadin 2.5         rx                           Plan:    Recovering from aspiration pneumonia.   Still on feedings 2-2-1    Angelica Rock - wife - bladder surgery and urinary issues -           James - son -  moved to [place] - has a house in [facility]     Gemma - daughter - lives in  [place] and working with [employer]/food share program nonprofit and immigration policy - she may work as ASL. Sees her a few times per week.     Drove down to his brothers   in a high profile camper van in the [facility].     He has fallen 4 times  No change in medications needed  He has not had tremors  He had a trip that lead to a fall.  Split his cheek open and went to the ED and had sutures of h is face.   This fall was over a month ago.     Interested in learning more about a lift chair - brother had one before he   Interested in learing more about a hospital bed.   OT and PT     He is better sleeping on his side than hs back in terms of drainage.     Last visit was 10/2024  Return in 6 months.     Shopped hospital bed and did not like them.   Looked at ?[facility]  He bought a wedge and did not work right  He has satin sheets and helps his mobility.   Satin pajamas  Slept terrible in the hospital bed with bed sore mattress.   Worse sleep he had in months.     Rx for hospital bed  Rx for lift chair.     To whom it may concern:    Haresh Rock,  : 1950 is a 74 year old, male with a diagnosis of Parkinson but not limited to this diagnosis. Due to their complex medical condition it is recommended that they receive a semielectric hospital bed. This will allow him/her to have frequent changes in body position to prevent skin breakdown and to help alleviate contractures, and to allow easier transfer from bed to wheelchair. He/She also requires the head of the bed to be elevated in order for her/him to stay in a healthy state by being able to handle his/her secretions and to prevent aspiration. The patient is able to work the controls himself/herself.          Coding statement:   Medical Decision Making:  #  Chronic progressive medical conditions addressed  - see above --   Review and/or interpretation of unique test or documentation from a provider outside of neurology yes   Independent historian provided additional details  no I  Prescription drug management and review of potential side effects and/or monitoring for side effects  -- see above ---  Health impacted by  social determinants of health  no    I have reviewed the note as documented above.  This accurately captures the substance of my conversation with the patient and total time spent preparing for visit, executing visit and completing visit on the day of the visit:  20 minutes.  The portion of this total time included face to face time     The longitudinal plan of care for Haresh Rock was addressed during this visit. Due to the added complexity in care, I will continue to support Haresh Rock in the subsequent management of this condition(s) and with the ongoing continuity of care of this condition(s).      Ángel Hill MD     ______________________________________    Last visit date and details:             ______________________________________      Patient was asked about 14 Review of systems including changes in vision (dry eyes, double vision), hearing, heart, lungs, musculoskeletal, depression, anxiety, snoring, RBD, insomnia, urinary frequency, urinary urgency, constipation, swallowing problems, hematological, ID, allergies, skin problems: seborrhea, endocrinological: thyroid, diabetes, cholesterol; balance, weight changes, and other neurological problems and these were not significant at this time except for   Patient Active Problem List   Diagnosis    Fklgl-xjivjh-jlti disease, chronic (H)    Polyneuropathy    History of bilateral knee replacement    Bone marrow transplant status (H)    Hyperlipidemia LDL goal <70    Chronic rhinitis    Gallstones without obstruction of gallbladder    Long-term (current) use of anticoagulants [Z79.01]    Type 2 diabetes mellitus with stage 2 chronic kidney disease, without long-term current use of insulin (H)    History of Hodgkin's lymphoma    History of irradiation, presenting hazards to health    Hereditary hemochromatosis    Oropharyngeal dysphagia    Personal history of PE (pulmonary embolism)    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 Blood products may be delayed. Draw patient 24 hours prior to transfusion. For [facility] testing, draw one red top and two purple top tubes for all Type and Screen orders.    Seasonal Allergies      Past Surgical History:   Procedure Laterality Date    ANESTHESIA CARDIOVERSION  04/21/2011    Procedure:ANESTHESIA CARDIOVERSION; Surgeon:GENERIC ANESTHESIA PROVIDER; Location:[facility] OR    ARTHROSCOPY KNEE RT/LT      LT    CATARACT IOL, RT/LT  2017    COLONOSCOPY  10/16/2012    Procedure: COLONOSCOPY;  Colonoscopy, screening;  Surgeon: eRg Feliciano MD;  Location: [facility] OR    COLONOSCOPY N/A 04/14/2021    Procedure: COLONOSCOPY;  Surgeon: Reg Holm MD;  Location: [facility] GI    ESOPHAGOSCOPY, GASTROSCOPY, DUODENOSCOPY (EGD), COMBINED N/A 10/07/2020    Procedure: ESOPHAGOGASTRODUODENOSCOPY, WITH BIOPSY;  Surgeon: Raul Sawyer DO;  Location: [facility] OR    H ABLATION FOCAL AFIB  03/05/2013    PVI    H ABLATION FOCAL AFIB  01/01/2018    HC BONE MARROW/STEM TRANSPLANT ALLOGENIC  05/08/2007    INSERT PORT VASCULAR ACCESS      IR GASTROSTOMY TUBE PERCUTANEOUS PLCMNT  10/25/2021    JOINT REPLACEMTN, KNEE RT/LT  03/28/2012    SHAWN    LAPAROSCOPY DIAGNOSTIC (GENERAL) N/A 10/29/2021    Procedure: LAPAROSCOPY, DIAGNOSTIC, TAKEDOWN OF MALPOSITIONED GASTROSTOMY TUBE, INSERTION OF GASTROSTOMY TUBE, SMALL BOWEL RESECTION X1, PRIMARY REPAIR OF SMALL BOWEL ENTEROTOMY, ABDOMINAL WASHOUT, TAP BLOCK;  Surgeon: Leif Finney DO;  Location: [facility] OR    PEG TUBE PLACEMENT  10/25/2021    VASECTOMY  1990     Past Medical History:   Diagnosis Date    Abnormal  dopamine scan (DaTSCAN) 2020 11/04/2020    [phone] 11/3/2020   Narrative & Impression   Examination: Nuclear medicine DATscan for Dopamine Receptor Localization.   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    Atrial fibrillation (H) 04/2011    Benign prostatic hyperplasia with urinary retention     Cirrhosis (H)     CKD (chronic kidney disease) stage 2, GFR 60-89 ml/min     Diabetes mellitus type II     steroid induced    DJD (degenerative joint disease) of knee     SHAWN, worse on right    DVT (deep venous thrombosis) (H)     ED (erectile dysfunction)     Gallbladder polyp 05/2010    Pbwxr-Ivlfbo-Cweb Disease 2007    BMT    Heart failure with preserved ejection fraction, NYHA class I (H)     Hemochromatosis     Hodgkin's disease NOS     5 cycles of ABVD in 2011    HTN (hypertension)     Hyperlipidaemia LDL goal <100     Multiple thyroid nodules     benign     Myeloproliferative disorder (H)     atypical, [facility]    Parkinson disease (H) 09/24/2020    PE (pulmonary embolism) 11/2004    Polyneuropathy     Pressure ulcer     Primary pulmonary hypertension (H)     Severe protein-calorie malnutrition 11/10/2021    Small bowel obstruction (H) 10/29/2021    Thrombocytopenia 06/08/2021    Thyroid nodule 05/17/2016     Social History     Socioeconomic History    Marital status:      Spouse name: Angelica    Number of children: 2    Years of education: 21    Highest education level: Not on file   Occupational History    Occupation:      Employer: [employer]     Employer: DISABLED   Tobacco Use    Smoking status: Never      Passive exposure: Never    Smokeless tobacco: Never   Vaping Use    Vaping status: Never Used   Substance and Sexual Activity    Alcohol use: No    Drug use: No    Sexual activity: Not Currently     Partners: Female   Other Topics Concern    Parent/sibling w/ CABG, MI or angioplasty before 65F 55M? No   Social History Narrative     about 48 yrs        Wife has some health issues - back pain - second knee replaced    She had gastric bypass and a fib and bad mitral valve        Son in [place]    Daughter in [facility] with 8 yo son.         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 [facility]        Born and raised [place] [facility]              Social Drivers of Health     Financial Resource Strain: Low Risk  (4/6/2025)    Received from [employer]    Financial Resource Strain     Difficulty of Paying Living Expenses: 3     Difficulty of Paying Living Expenses: Not on file   Food Insecurity: No Food Insecurity (4/6/2025)    Received from [employer]    Food Insecurity     Do you worry your food will run out before you are able to buy more?: 1   Transportation Needs: No Transportation Needs (4/6/2025)    Received from [employer]    Transportation Needs     Does lack of transportation keep you from medical appointments?: 1     Does lack of transportation keep you from work, meetings or getting things that you need?: 1   Physical Activity: Insufficiently Active (1/10/2025)    Exercise Vital Sign     Days of Exercise per Week: 1 day     Minutes of Exercise per Session: 20 min   Stress: No Stress Concern Present (1/10/2025)    [employer] [employer] - Occupational Stress Questionnaire     Feeling of Stress : Only a little   Social Connections: Socially Integrated (4/6/2025)    Received from [employer]    Social Connections     Do you often feel lonely or  isolated from those around you?: 0   Interpersonal Safety: Low Risk  (1/13/2025)    Interpersonal Safety     Do you feel physically and emotionally safe where you currently live?: Yes     Within the past 12 months, have you been hit, slapped, kicked or otherwise physically hurt by someone?: No     Within the past 12 months, have you been humiliated or emotionally abused in other ways by your partner or ex-partner?: No   Housing Stability: Low Risk  (4/6/2025)    Received from [facility]    Housing Stability     What is your housing situation today?: 1       Drug and lactation database from the [place] [employer]:  http://toxnet.nlm.nih.gov/cgi-bin/sis/htmlgen?LACT      B/P: Data Unavailable, T: Data Unavailable, P: Data Unavailable, R: Data Unavailable 0 lbs 0 oz  There were no vitals taken for this visit., There is no height or weight on file to calculate BMI.  Medications and Vitals not listed above were documented in the cart and reviewed by me.     Current Outpatient Medications   Medication Sig Dispense Refill    amoxicillin (AMOXIL) 500 MG capsule Take 2,000 mg by mouth once Take 1 hour prior to dental procedure      atropine 1 % ophthalmic solution Instill 1-2 drops in the cheek or under the tongue every 8 hours as needed. Decrease dose if experiencing dry mouth. 10 mL 11    carbidopa-levodopa (SINEMET)  MG tablet 1-1.5  tablet daily at 8-9am, 1.5-2 tablets at 2pm and 1 tablet at 7-7:30 pm = 4 to 4.5/day 405 tablet 3    flecainide (TAMBOCOR) 50 MG tablet Take 1 tablet (50 mg) by mouth 2 times daily. 180 tablet 3    ipratropium (ATROVENT) 0.06 % nasal spray Administer one to 2 sprays in mouth once or twice daily 15 mL 11    loratadine (CLARITIN REDITABS) 10 MG ODT Take 10 mg by mouth daily      MELATONIN PO Take 1 tablet by mouth at bedtime.      metoprolol tartrate (LOPRESSOR) 25 MG tablet TAKE ONE-HALF (1/2) TABLET BY FEEDING TUBE TWICE A DAY,  CRUSH TABLET 90 tablet 4    NONFORMULARY Abbott osmolyte feedings 5 cans 7/day      Osmolite 1.5 Ed 237 mL Place 1,422 mLs into G tube daily. Infuse via gravity bag  6 cartons/day  Water flush: 60ml before and after feedings +  120ml 4x/day 34806 mL 11    rosuvastatin (CRESTOR) 40 MG tablet Take 1 tablet (40 mg) by mouth at bedtime. 90 tablet 4    warfarin ANTICOAGULANT (COUMADIN) 2.5 MG tablet Take 1.5 tablets (3.75 mg) [facility] Thu; 1 tab (2.5 mg) all other days or as directed by coumadin clinic 110 tablet 1         Ángel Hill MD

## 2025-04-17 PROCEDURE — B4036 ENTERAL FEED SUP KIT GRAV BY: HCPCS

## 2025-04-18 ENCOUNTER — HOME INFUSION (OUTPATIENT)
Dept: HOME HEALTH SERVICES | Facility: HOME HEALTH | Age: 75
End: 2025-04-18
Payer: COMMERCIAL

## 2025-04-18 PROCEDURE — B4036 ENTERAL FEED SUP KIT GRAV BY: HCPCS

## 2025-04-19 PROCEDURE — B4036 ENTERAL FEED SUP KIT GRAV BY: HCPCS

## 2025-04-20 PROCEDURE — B4036 ENTERAL FEED SUP KIT GRAV BY: HCPCS

## 2025-04-21 ENCOUNTER — HOME INFUSION BILLING (OUTPATIENT)
Dept: HOME HEALTH SERVICES | Facility: HOME HEALTH | Age: 75
End: 2025-04-21
Payer: COMMERCIAL

## 2025-04-21 PROCEDURE — B4036 ENTERAL FEED SUP KIT GRAV BY: HCPCS

## 2025-04-21 PROCEDURE — B4152 EF CALORIE DENSE>/=1.5KCAL: HCPCS

## 2025-04-22 PROCEDURE — A6402 STERILE GAUZE <= 16 SQ IN: HCPCS

## 2025-04-22 PROCEDURE — B4036 ENTERAL FEED SUP KIT GRAV BY: HCPCS

## 2025-04-23 PROCEDURE — B4036 ENTERAL FEED SUP KIT GRAV BY: HCPCS

## 2025-04-24 PROCEDURE — B4036 ENTERAL FEED SUP KIT GRAV BY: HCPCS

## 2025-04-25 PROCEDURE — B4036 ENTERAL FEED SUP KIT GRAV BY: HCPCS

## 2025-04-26 PROCEDURE — B4036 ENTERAL FEED SUP KIT GRAV BY: HCPCS

## 2025-04-27 PROCEDURE — B4036 ENTERAL FEED SUP KIT GRAV BY: HCPCS

## 2025-04-28 ENCOUNTER — OFFICE VISIT (OUTPATIENT)
Dept: NEUROLOGY | Facility: CLINIC | Age: 75
End: 2025-04-28
Payer: COMMERCIAL

## 2025-04-28 VITALS
SYSTOLIC BLOOD PRESSURE: 93 MMHG | HEIGHT: 71 IN | BODY MASS INDEX: 22.82 KG/M2 | DIASTOLIC BLOOD PRESSURE: 55 MMHG | WEIGHT: 163 LBS

## 2025-04-28 DIAGNOSIS — G20.A1 PARKINSON'S DISEASE, UNSPECIFIED WHETHER DYSKINESIA PRESENT, UNSPECIFIED WHETHER MANIFESTATIONS FLUCTUATE (H): Primary | ICD-10-CM

## 2025-04-28 PROCEDURE — B4036 ENTERAL FEED SUP KIT GRAV BY: HCPCS

## 2025-04-28 PROCEDURE — 3074F SYST BP LT 130 MM HG: CPT | Performed by: PSYCHIATRY & NEUROLOGY

## 2025-04-28 PROCEDURE — 3078F DIAST BP <80 MM HG: CPT | Performed by: PSYCHIATRY & NEUROLOGY

## 2025-04-28 PROCEDURE — 99213 OFFICE O/P EST LOW 20 MIN: CPT | Performed by: PSYCHIATRY & NEUROLOGY

## 2025-04-28 RX ORDER — OMEPRAZOLE 20 MG/1
20 CAPSULE, DELAYED RELEASE ORAL DAILY
COMMUNITY

## 2025-04-28 RX ORDER — CARBIDOPA AND LEVODOPA 25; 100 MG/1; MG/1
TABLET ORAL
Qty: 450 TABLET | Refills: 3 | Status: SHIPPED | OUTPATIENT
Start: 2025-04-28

## 2025-04-28 RX ORDER — CARBIDOPA AND LEVODOPA 25; 100 MG/1; MG/1
TABLET ORAL
Qty: 450 TABLET | Refills: 3 | Status: SHIPPED | OUTPATIENT
Start: 2025-04-28 | End: 2025-04-28

## 2025-04-28 NOTE — NURSING NOTE
Haresh Rock's goals for this visit include:   Chief Complaint   Patient presents with    Parkinson      Parkinson's disease // 6 follow up/ er vissit fell and hit face  Fell 4 times this year       He requests these members of his care team be copied on today's visit information: yes    PCP: Anya Nowak    Referring Provider:  Referred Self, MD  No address on file    There were no vitals taken for this visit.    Do you need any medication refills at today's visit? No    JUAN Pritchard, CHLOÉ ([facility])

## 2025-04-28 NOTE — LETTER
2025      Haresh Rock  [address] [place] [address]      Dear Colleague,    Thank you for referring your patient, Haresh Rock, to the [facility] [place]. Please see a copy of my visit note below.    Diagnosis/Summary/Recommendations:    PATIENT: Haresh Rock  74 year old male     : 1950    KEN: 2025       MRN: [id number]  [address] [place] [address]  [phone] (M)  [phone] (H)  Jwg1@One Month     - sheyla Ramirez - son  Gemma - daughter  Sheyla Rock  [phone]     nam@One Month,   [email or web address]  Jwg1@One Month     [phone]     Sitting in a lazy boy     [phone]     Assessment:  (G20) Parkinson disease (H)  (primary encounter diagnosis)     Dopamine scan - (datscan)  11/3/2020  A presynaptic dopaminergic deficit is demonstrated bilaterally.     Review of diagnosis    parkinson  Avoidance of dopamine blockers yes  Motor complication review  -   Review of Impulse control disorders no  Review of surgical or medication options yes     Avoidance of dopamine blockers   Not ta josiane     Motor complication review   no     Review of Impulse control disorders   denies     Review of surgical or medication options   reviewed     Gait/Balance/Falls   No falls     Exercise/Therapy performed/offered   Doing shoveling snow     Cognitive/Driving   Driving - not problems  No cognitive problems - no changes        Mood   Daughter gemma with bipolar - 2 or 3 miles away   Son july with mood issues  2 or 3 miles away   He has some depression     Living with Sheyla - has ongoing back pain - getting a tooth extracted this afternoon;  Constipation, heart and bladder issues.   Constipation or diarrhea - has problems controlling her bowels     Hallucinations/delusions   No hallucinations     Sleep   Pseudoeph-doxylamine DM APAP nyquil - not using  Sleep pretty well  Nocturia 2/noc  Falls asleep during the day  Napping during the day  Falls  asleep when watching news  Falls asleep during the afternoon  Up 3/noc for bathroom   Midnight goes to bed and up at 8am  Naps during day   Not talking in sleep or snoring.  No unusual behaviors  Wakes up before 3am, 5/6a and 7am     Bladder/Renal/Prostate/Gyn/Other  Renal function.   May have stage 2 disease.      Prostate - denies problems. He has has nocturia couple times per night.   Denies elevated psa     Prostate surgery; urinating better     Dutasteride avodart 0.5mg - off   Tamsulosin flomax 0.4mg - stopped  Had low blood pressure and fluids     July 5th ED visit and got catheter  Surgery yesterday laser and should get catheter out  Dr Jose G Hawley     GI/Constipation/GERD   Swallow study 9/22/2020  1. Multiple events of deep laryngeal penetration with thinner barium consistencies with intermittent aspiration.      Gallstones but has not gallbladder removed.  Had a gallbladder attack x 1 and is not on actigall     Bisacodyl dulcolax 5mg EC tablet - not using   Calcium carbonate tums 500mg ?  Nutritional supplement - 1 bottle per day  - not using   Pantoprazole protonix 20mg - not using  Polythyelene glycol miralax - not using   Prevident 5000 dry mouth - using  Senna-docusate senexon-S senokot-S  - no  Weight loss better with feeding tube  No constipation  Has some seepage  When urinates he has some stool     Had bloating and blockage with the feeding tube placement and had emergency surgery   10/29/2021 Scan showed problems     Feeding tube has helped him gain 20 lbs - he gets feedings 1 hour after medications so 9am, 3pm and 9pm     Takes parkinson medications by mouth for the first two doses and crush the last one of the day through the feeding tube  Schedule is 8am, 2pm and 8pm     With his fecal seepage discussed the use of a bidet or adapting his toilet with one - h e has a raised toilet seat so not clear what needs to be done. Consider seeing Occupational therapy.      Formula switched from abbott to  nestle  161 or so lbs        ENDO/Lipid/DM/Bone density/Thyroid  Cholesterol  Rosuvastatin crestor 40mg   Vitamin D3 cholecalciferol 50 mcg 2000 units  Had diabetes in the past from prednisone  And this may have resolved  May have a thyroid nodule.  TSH was normal.   A1c was 5.5         Cardio/heart/Hyper or Hypotensive   Flecainide tambocor 50mg twice daily  Metoprolol succinate ER Toprol XL 25mg 24 hr tablet  Blood pressure has been okay   122/63  Low bp has cleared up  MRI of heart next week     Vision/Dry Eyes/Cataracts/Glaucoma/Macular   Wears glasses  Eye - left eye has been poor since age 2 due to a lazy eye - amblyopia  Right eye post cataract surgery had an artificial lens put in and does not wear glasses till the evening         Heme/Anticoagulation/Antiplatelet/Anemia/Other  Folic acid folvite 1mg - not taking   Anticoagulated  Warfarin     Hereditary hemochromatosis gene - gene that increases his risk and has not had problems with this. Had had phlebotomies x 2 in the past.      AYUSH Torres     History of pulmonary embolii - back in 2004. This was related to blood clotting problems.   May have had a DVT. Reportedly has had antiphospholipid antibody. He is on coumadin.      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 Had a transplant from Dr. 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  No problems since then.      Graft vs host     He has high sed rate and will be seeing ID next week   crp was 12.8  Sed rate was 90  8/3/2021  Working with Marcelo Jacques     ENT/Resp  Hodgkin's lymphoma in the past 2011 and had chemotherapy for this and followed with CT scan and reportedly this is gone.      Pulmonary hypertension in the past.   Fluticasone flonase 50 mcg/act nasal spray  Loratadine claritin 10mg    CT of lung next      Hearing. Feels he has wax in his left ear and partial problem with the right ear. It is variable problem. Has used ear wax removal.       Feeding tube has helped him gain 20 lbs - he gets feedings 1 hour after medications so 9am, 3pm and 9pm     Skin/Cancer/Seborrhea/other  No skin cancer     Musculoskeletal/Pain/Headache  bilateral knee replacements.     Other:  He reports a neuropathy and that his foot drop has resolved. 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     No constipation - bowels are a bit loose  Getting feedings - using nestle product  He has reflux -  Has a pillow but not a pillow wedge  He has to brush his teeth several times daily  Avoiding water pik because of difficulty handling secretions   Teeth gets crusty - tartar     May want to talk with his pcp about the omeprazole - should he be taking a different product or/and higher dose  Famotidine (pepcid) or Pantoprazole (protonix)     Overall his parkinson is stable  No falls  [facility]illed his sinemet that is [facility]     Ongoing nocturia - had tried medication in the past that did not help  Not sure if he has been on mirabegron/myrbetriq, ubegron/gemtesa  Has seen Dr. Hawley jan 2023   Benign prostatic hyperplasia with urinary retention  (primary encounter diagnosis)  Comment: Bladder scan showed minimal residual urine today.  (N45.1) Epididymitis     Blood pressure has been stable  Anticoagulated   Using flecainide for his heart     Ongoing hematological care with Dr. JUNIOR     Not exercising  No falls  He arrives today by himself  He had an ulcer left foot - was wearing a boot and was off balance and gave up on it and was healed   He has a neuropathy and had a stick that stuck there that he was unaware of - and a woman who does his toes identified it and sent him in to see his doctor     He has not noticed wearing off - taking his medication 3/day      Ordered big and loud t herapy =      Swallowing - he had some problems with tylenol caplet and coughed it up as he had problems swallowing it.   He got one tylenol down and was not able to get the other pill  "down  He has a lot of  \"reflux\" due to the osmolyte formula  He has some cramping with his bowels - some gas  Bowels are explosive and loose and every few days  He has some stool every day and is loose  Not well formed - more \"pelletized\"     He is urinating 4-5 times per night     He is up during the night  He has achiness of his hip - he gets up and this helps his hip pain.      He has a new cat that sleeps on his bed and wakes him up -   Feeds her before  Sleeps during the day and up at night   Cat was scared initially when they got the cat from shelter in [place]      Blood pressure has been stable  Anticoagulated   Using flecainide for his heart  Metoprolol      Angelica Rock - wife - bladder surgery and urinary issues - suprapubic catheter     James - son - working in [place] and has house in [facility]  - trying to get a local job.  Working on boost service.   Gemma - daughter - lives in  [place] and working with [employer] and immigration policy     Blood work today  Paul tomorrow virtual     Bekah Matt appointment cardiology 10/10     Covid Pizer 11/6/2023 - needs to be cancelled as got booster already      Return back 6 months.      Completed PT, SLP and OT and found the OT the most helpful as he had done PT and SLP in the past and had a bit of repetition to it and plateaued from benefit.      Taking his sinemet by mouth int he morning and swallows the 2nd dose in the afternoon and crushes his evening dose with other medications and puts in the tube.      He has more tremor and use to be just the right hand and now the left hand as well in the evening watching tv  Discussed protein and sinemet interaction and he is presently taking his medication usually 1 hour  before food.   His shaking is before his evening pills which he takes at 8pm. He has some double tapping on his phone and keyboard.      No falls but when he gets up in the middle of the night he is tipsy and he is careful.    "   He has urinary frequency. Seen urologist last year. Jose G Hawley  He has not been on medication for his bladder  He has not been on mirabegron/myrbetriq  He has not been on gemtesa/ubegron  As best I can determine.      Pharmacy ([facility]) consultation and medication management     Flecainide tambocor 50mg  Metoprolol tartrate 25mg   Blood pressure was 105/61 and heart rate was 67 today  He has had some light headedness but his readings are not low from his phone on review today.      6/4/2024 abdominal ultrasound  6/11/2024 return visit to see Dr. Miller and blood work   Transplant 2007  Lymphoma in the past  He is anticoagulated      He has not had choking but has some reflux and is always taste things. He does not have much appetite.   He is using a formula 2 cans 3 times per day  osmolyte   Taking omeprazole in the morning.   He rarely uses the prevident as he has swallowing problems and is easier without anything on his brush  He is getting his teeth cleaned every 4 months  He is taking claritin daily      Bowels are pretty - firm to loose and not taking anything and has variable where some days he is loose and other days he is not having a bowel movement for a few days.      Sleep is stable. He sleeps separately from his wife who has cpap and a suprapubic and he listens to [facility] radio. He listens to NPR, CBC and BBC. ?hallucinations - tactile at night vs muscle twitching.      Right hip pain and sleeping on the right side - not having daytime pain  Right leg is weaker.     Angelica Rock - wife - bladder surgery and urinary issues - suprapubic catheter     James - son - working in [place] and has house in [facility]  - trying to get a local job.  Working on boost service.   Gemma - daughter - lives in  [place] and working with [employer] and immigration policy     Consider taking 1.5 tabs of sinemet at 2pm  Consider parcopa 25/100 1/2 tab as needed in the evening - this dissolves in your mouth. -  pharmacist lizeth bocanegra says not available  Pharmacy input - Pharmacy ([facility]) consultation and medication management  Please call the scheduling number @ [phone] to set up an appointment with pharmacists Leslie Clifford or Pricila Saleem.   4. Return appointment  - last visit was 10/9/2023 and return in 6 months.   5. Bladder issues ?mirabegron      He has an apple watch - and monitoring his sleep  He is sleeping at night to a radio and not sure if impacting his apple watch  He sleeps during the day and not sleeping at night.      He ha more speech issues - but will try to do the exercises  He has drooling at night. He is willing to try ipatropium nasal spray once or twice daily for drooling.   Denies pneumonia      Urinates every 1 or 1.5 hours. This is an issue.   He had tried flomax w hich did not help.   He has not been on mirabron/myrbetriq (tier 3/5) or gemtesa/ubegron (not on formulary)  He has not seen urology recently  Had a TURP in the past  Major issue is the urinary frequency.      He may be having illusions - seeing something out of the corner of his left eye.   His left eye is bad. No change in his Rx.      Cholesterol   Rosuvastatin crestor 40mg       Recent Labs   Lab Test 01/17/24  1428 10/11/22  1553   CHOL 95 92   HDL 33* 39*   LDL 34 40   TRIG 141 66      Flecainide tambocor 50mg twice daily   Metoprolol tartrate 25mg 1/2 twice daily   Blood pressure was 110/68 and heart rate was 71  Has future appointments with cardiology including and Echocardiogram     He has some balance issues and not climbing ladder to change light bulbs.   He has had 3 falls in the past month. Holding onto laundry basket and holding on to rail on stairs and missed a step and fell 5 steps from the bottom and landed on basket and knees and elbows hit the concrete. He is anticoagulated.   #2 Had a fall when not holding onto the rail.  #3 He had a fall taking out recycling - fell on a pile of boxes and had problems getting  up. He is trying to be safer. He carries his feeding back up in the living room and may need to do a separate trip or have his wife help him out. He watches tv while getting his feedings.      Carbidopa/levodopa Sinemet 25/100 he is on 1  - 1.5 - 1   He has noticed some problems in the afternoon around 2 pm   [facility]   He may take 1 to 1.5 in the morning and 1.5 to 2 at 2pm and 1 at night      He is using the osmolyte 3 times per day  Fv was calling today during the visit about his feeding supplies.   His weight has been stable. He is on 2 cans 3/day     Angelica Rock - wife - bladder surgery and urinary issues - suprapubic catheter changed every month and still has incontinence around her urethra and has a pain pump for back pain. She was able to make a wedding in [place] last week with son James.  Sung stayed home.       James - son - working in [place] and has house in [place]  - working from [employer] and had ended contract and may be working for a [place] company remotely.      Gemma - daughter - lives in  [place] and working with [employer]/food share program nonprofit and immigration policy - she may work as ASL. She has her mood issues.      Sees Hematology every 6 months - followed by Melissa for 17 or 19 years for his lymphoma   He is on coumadin/anticoagulated        Sed Rate   Date Value Ref Range Status   06/08/2021 106 (H) 0 - 20 mm/h Final            Erythrocyte Sedimentation Rate   Date Value Ref Range Status   06/12/2024 71 (H) 0 - 20 mm/hr Final            CRP Inflammation   Date Value Ref Range Status   06/12/2024 11.60 (H) <5.00 mg/L Final      Ferritin was 149 as of 6/12/2024        Ferritin   Date Value Ref Range Status   06/12/2024 149 31 - 409 ng/mL Final   02/15/2021 268 26 - 388 ng/mL Final            Iron   Date Value Ref Range Status   06/12/2024 122 61 - 157 ug/dL Final   02/15/2021 78 35 - 180 ug/dL Final             Iron Binding Cap   Date Value Ref Range Status   02/15/2021 227 (L) 240 - 430 ug/dL Final            Iron Binding Capacity   Date Value Ref Range Status   06/12/2024 231 (L) 240 - 430 ug/dL Final   04/05/2022 255 240 - 430 ug/dL Final         - The patient has a medical condition which requires positioning of the body in ways not feasible with an ordinary bed, and the patient requires positioning of the body in ways not feasible with an ordinary bed in order to alleviate pain, and the patient requires the head of the bed to be elevated more than 30 degrees most of the time due to heart failure, chronic pulmonary disease, or problems with aspiration, OR the patient requires traction equipment, which only be attached to a hospital bed.      This latter issue is most relevant due to his tube feedings.      In summary  Small change in sinemet  Urology consultation for possible trial of mirabegron  Return in 6months  Ipatropium for drooling      Medications      9a   2p  3p 730p  9p   Amoxicillin amoxil 500mg dentist             Atropine 1% opthal mouth         Carbidopa/levodopa Sinemet 25/100 1.5   2   1     Flecainide tambocor 50mg 1       1     Ipratropium atrovent nasal & mouth         Loratadine claritin 10mg ODT 1             Melatonin liquid      4gtt   Metoprolol tartrate 25mg  1/2        1/2      MVI centrum off             Nonform feeding tube osmolyte   2    2    1   Omeprazole prilosec 20mg 1             Prevident 5000 dry mouth rare             Rosuvastatin crestor 40mg         1     Senna-docusate 8.6-50 no             Starch-maltodextrin thick-it no             Vit D3 cholecalciferol 50 2000  off             Warfarin anticoag coumadin 2.5         rx                           Plan:    Recovering from aspiration pneumonia.   Still on feedings 2-2-1    Angelica Rock - wife - bladder surgery and urinary issues -           James - son -  moved to [place] - has a house in [facility]  - daughter - lives in  [place] and working with [employer]/food share program nonprofit and immigration policy - she may work as ASL. Sees her a few times per week.     Drove down to his brothers  in a high profile camper van in the [facility].     He has fallen 4 times  No change in medications needed  He has not had tremors  He had a trip that lead to a fall.  Split his cheek open and went to the ED and had sutures of h is face.   This fall was over a month ago.     Interested in learning more about a lift chair - brother had one before he   Interested in learing more about a hospital bed.   OT and PT     He is better sleeping on his side than hs back in terms of drainage.     Last visit was 10/2024  Return in 6 months.     Shopped hospital bed and did not like them.   Looked at ?[facility]  He bought a wedge and did not work right  He has satin sheets and helps his mobility.   Satijennifer qureshi  Slept terrible in the hospital bed with bed sore mattress.   Worse sleep he had in months.     Rx for hospital bed  Rx for lift chair.     To whom it may concern:    Haresh Rock,  : 1950 is a 74 year old, male with a diagnosis of Parkinson but not limited to this diagnosis. Due to their complex medical condition it is recommended that they receive a semielectric hospital bed. This will allow him/her to have frequent changes in body position to prevent skin breakdown and to help alleviate contractures, and to allow easier transfer from bed to wheelchair. He/She also requires the head of the bed to be elevated in order for her/him to stay in a healthy state by being able to handle his/her secretions and to prevent aspiration. The patient is able to work the controls himself/herself.          Coding statement:   Medical Decision Making:  #  Chronic progressive medical conditions addressed  - see above --   Review and/or interpretation of unique test or documentation from a provider outside of  neurology yes   Independent historian provided additional details  no I  Prescription drug management and review of potential side effects and/or monitoring for side effects  -- see above ---  Health impacted by social determinants of health  no    I have reviewed the note as documented above.  This accurately captures the substance of my conversation with the patient and total time spent preparing for visit, executing visit and completing visit on the day of the visit:  20 minutes.  The portion of this total time included face to face time     The longitudinal plan of care for Haresh Rock was addressed during this visit. Due to the added complexity in care, I will continue to support Haresh Rock in the subsequent management of this condition(s) and with the ongoing continuity of care of this condition(s).      Ángel Hill MD     ______________________________________    Last visit date and details:     ______________________________________    Patient was asked about 14 Review of systems including changes in vision (dry eyes, double vision), hearing, heart, lungs, musculoskeletal, depression, anxiety, snoring, RBD, insomnia, urinary frequency, urinary urgency, constipation, swallowing problems, hematological, ID, allergies, skin problems: seborrhea, endocrinological: thyroid, diabetes, cholesterol; balance, weight changes, and other neurological problems and these were not significant at this time except for   Patient Active Problem List   Diagnosis    Xkict-[id number] disease, chronic (H)    Polyneuropathy    History of bilateral knee replacement    Bone marrow transplant status (H)    Hyperlipidemia LDL goal <70    Chronic rhinitis    Gallstones without obstruction of gallbladder    Long-term (current) use of anticoagulants [Z79.01]    Type 2 diabetes mellitus with stage 2 chronic kidney disease, without long-term current use of insulin (H)    History of Hodgkin's lymphoma    History of irradiation,  presenting hazards to health    Hereditary hemochromatosis    Oropharyngeal dysphagia    Personal history of PE (pulmonary embolism)    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 Blood products may be delayed. Draw patient 24 hours prior to transfusion. For [facility] testing, draw one red top and two purple top tubes for all Type and Screen orders.    Seasonal Allergies      Past Surgical History:   Procedure Laterality Date    ANESTHESIA CARDIOVERSION  04/21/2011    Procedure:ANESTHESIA CARDIOVERSION; Surgeon:GENERIC ANESTHESIA PROVIDER; Location:[facility] OR    ARTHROSCOPY KNEE RT/LT      LT    CATARACT IOL, RT/LT  2017    COLONOSCOPY  10/16/2012    Procedure: COLONOSCOPY;  Colonoscopy, screening;  Surgeon: Reg Feliciano MD;  Location: [facility] OR    COLONOSCOPY N/A 04/14/2021    Procedure: COLONOSCOPY;  Surgeon: Reg Holm MD;  Location: [facility] GI    ESOPHAGOSCOPY, GASTROSCOPY, DUODENOSCOPY (EGD), COMBINED N/A 10/07/2020    Procedure: ESOPHAGOGASTRODUODENOSCOPY, WITH BIOPSY;  Surgeon: Raul Sawyer DO;  Location: [facility] OR    H ABLATION FOCAL AFIB  03/05/2013    PVI    H ABLATION FOCAL AFIB  01/01/2018    HC BONE MARROW/STEM TRANSPLANT ALLOGENIC  05/08/2007    INSERT PORT VASCULAR ACCESS      IR GASTROSTOMY TUBE PERCUTANEOUS PLCMNT  10/25/2021    JOINT REPLACEMTN, KNEE RT/LT  03/28/2012    SHAWN    LAPAROSCOPY DIAGNOSTIC (GENERAL) N/A 10/29/2021    Procedure: LAPAROSCOPY, DIAGNOSTIC, TAKEDOWN OF MALPOSITIONED GASTROSTOMY TUBE, INSERTION OF GASTROSTOMY TUBE, SMALL BOWEL RESECTION X1, PRIMARY REPAIR OF SMALL BOWEL ENTEROTOMY,  ABDOMINAL WASHOUT, TAP BLOCK;  Surgeon: Leif Finney DO;  Location: [facility] OR    PEG TUBE PLACEMENT  10/25/2021    VASECTOMY  1990     Past Medical History:   Diagnosis Date    Abnormal dopamine scan (DaTSCAN) 2020 11/04/2020    [phone] 11/3/2020   Narrative & Impression   Examination: Nuclear medicine DATscan for Dopamine Receptor Localization.   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    Atrial fibrillation (H) 04/2011    Benign prostatic hyperplasia with urinary retention     Cirrhosis (H)     CKD (chronic kidney disease) stage 2, GFR 60-89 ml/min     Diabetes mellitus type II     steroid induced    DJD (degenerative joint disease) of knee     SHAWN, worse on right    DVT (deep venous thrombosis) (H)     ED (erectile dysfunction)     Gallbladder polyp 05/2010    Qpwhh-Qgnqrh-Vvrj Disease 2007    BMT    Heart failure with preserved ejection fraction, NYHA class I (H)     Hemochromatosis     Hodgkin's disease NOS     5 cycles of ABVD in 2011    HTN (hypertension)     Hyperlipidaemia LDL goal <100     Multiple thyroid nodules     benign     Myeloproliferative disorder (H)     atypical, [facility]    Parkinson disease (H) 09/24/2020    PE (pulmonary embolism) 11/2004    Polyneuropathy     Pressure ulcer     Primary pulmonary hypertension (H)     Severe protein-calorie malnutrition 11/10/2021    Small bowel obstruction (H) 10/29/2021    Thrombocytopenia 06/08/2021    Thyroid nodule 05/17/2016     Social History     Socioeconomic History    Marital status:      Spouse name: Angelica    Number of children: 2     Years of education: 21    Highest education level: Not on file   Occupational History    Occupation:      Employer: [employer]     Employer: [employer]   Tobacco Use    Smoking status: Never     Passive exposure: Never    Smokeless tobacco: Never   Vaping Use    Vaping status: Never Used   Substance and Sexual Activity    Alcohol use: No    Drug use: No    Sexual activity: Not Currently     Partners: Female   Other Topics Concern    Parent/sibling w/ CABG, MI or angioplasty before 65F 55M? No   Social History Narrative     about 48 yrs        Wife has some health issues - back pain - second knee replaced    She had gastric bypass and a fib and bad mitral valve        Son in [place]    Daughter in [facility] with 10 yo son.         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[employer]        Born and raised in [place]              Social Drivers of Health     Financial Resource Strain: Low Risk  (4/6/2025)    Received from [email or web address]    Financial Resource Strain     Difficulty of Paying Living Expenses: 3     Difficulty of Paying Living Expenses: Not on file   Food Insecurity: No Food Insecurity (4/6/2025)    Received from [email or web address]    Food Insecurity     Do you worry your food will run out before you are able to buy more?: 1   Transportation Needs: No Transportation Needs (4/6/2025)    Received from [email or web address]    Transportation Needs     Does lack of transportation keep you from medical appointments?: 1     Does lack of transportation keep you from work, meetings or getting things that you need?: 1   Physical Activity: Insufficiently Active (1/10/2025)    Exercise Vital Sign     Days of Exercise per Week: 1 day     Minutes of Exercise per Session: 20 min   Stress: No Stress Concern Present (1/10/2025)    [employer] [place] [employer] - Occupational Stress Questionnaire      Feeling of Stress : Only a little   Social Connections: Socially Integrated (4/6/2025)    Received from [facility]    Social Connections     Do you often feel lonely or isolated from those around you?: 0   Interpersonal Safety: Low Risk  (1/13/2025)    Interpersonal Safety     Do you feel physically and emotionally safe where you currently live?: Yes     Within the past 12 months, have you been hit, slapped, kicked or otherwise physically hurt by someone?: No     Within the past 12 months, have you been humiliated or emotionally abused in other ways by your partner or ex-partner?: No   Housing Stability: Low Risk  (4/6/2025)    Received from [facility]    Housing Stability     What is your housing situation today?: 1     Drug and lactation database from the [place] National Library of Medicine:  http://toxnet.nlm.nih.gov/cgi-bin/sis/htmlgen?LACT      B/P: Data Unavailable, T: Data Unavailable, P: Data Unavailable, R: Data Unavailable 0 lbs 0 oz  There were no vitals taken for this visit., There is no height or weight on file to calculate BMI.  Medications and Vitals not listed above were documented in the cart and reviewed by me.     Current Outpatient Medications   Medication Sig Dispense Refill    amoxicillin (AMOXIL) 500 MG capsule Take 2,000 mg by mouth once Take 1 hour prior to dental procedure      atropine 1 % ophthalmic solution Instill 1-2 drops in the cheek or under the tongue every 8 hours as needed. Decrease dose if experiencing dry mouth. 10 mL 11    carbidopa-levodopa (SINEMET)  MG tablet 1-1.5  tablet daily at 8-9am, 1.5-2 tablets at 2pm and 1 tablet at 7-7:30 pm = 4 to 4.5/day 405 tablet 3    flecainide (TAMBOCOR) 50 MG tablet Take 1 tablet (50 mg) by mouth 2 times daily. 180 tablet 3    ipratropium (ATROVENT) 0.06 % nasal spray Administer one to 2 sprays in mouth once or twice daily 15 mL 11    loratadine (CLARITIN  REDITABS) 10 MG ODT Take 10 mg by mouth daily      MELATONIN PO Take 1 tablet by mouth at bedtime.      metoprolol tartrate (LOPRESSOR) 25 MG tablet TAKE ONE-HALF (1/2) TABLET BY FEEDING TUBE TWICE A DAY, CRUSH TABLET 90 tablet 4    NONFORMULARY Abbott osmolyte feedings 5 cans 7/day      Osmolite 1.5 Ed 237 mL Place 1,422 mLs into G tube daily. Infuse via gravity bag  6 cartons/day  Water flush: 60ml before and after feedings +  120ml 4x/day 61932 mL 11    rosuvastatin (CRESTOR) 40 MG tablet Take 1 tablet (40 mg) by mouth at bedtime. 90 tablet 4    warfarin ANTICOAGULANT (COUMADIN) 2.5 MG tablet Take 1.5 tablets (3.75 mg) [facility] Thu; 1 tab (2.5 mg) all other days or as directed by coumadin clinic 110 tablet 1       Ángel Hill MD      Again, thank you for allowing me to participate in the care of your patient.        Sincerely,      Ángel Hill MD  Electronically signed

## 2025-04-29 ENCOUNTER — TELEPHONE (OUTPATIENT)
Dept: FAMILY MEDICINE | Facility: CLINIC | Age: 75
End: 2025-04-29
Payer: COMMERCIAL

## 2025-04-29 ENCOUNTER — MEDICAL CORRESPONDENCE (OUTPATIENT)
Dept: HEALTH INFORMATION MANAGEMENT | Facility: CLINIC | Age: 75
End: 2025-04-29
Payer: COMMERCIAL

## 2025-04-29 PROCEDURE — B4036 ENTERAL FEED SUP KIT GRAV BY: HCPCS

## 2025-04-29 NOTE — TELEPHONE ENCOUNTER
Forms/Letter Request    Type of form/letter: Home Health Certification    Do we have the form/letter: Yes:     Who is the form from? Home care    Where did/will the form come from? form was faxed in    How would you like the form/letter returned: Fax : [phone]    Form given to provider.    Zayra Crews   [employer]

## 2025-04-30 PROCEDURE — B4036 ENTERAL FEED SUP KIT GRAV BY: HCPCS

## 2025-05-01 PROCEDURE — B4036 ENTERAL FEED SUP KIT GRAV BY: HCPCS

## 2025-05-02 PROCEDURE — B4036 ENTERAL FEED SUP KIT GRAV BY: HCPCS

## 2025-05-03 PROCEDURE — B4036 ENTERAL FEED SUP KIT GRAV BY: HCPCS

## 2025-05-04 PROCEDURE — B4036 ENTERAL FEED SUP KIT GRAV BY: HCPCS

## 2025-05-05 PROCEDURE — B4036 ENTERAL FEED SUP KIT GRAV BY: HCPCS

## 2025-05-06 ENCOUNTER — ANCILLARY PROCEDURE (OUTPATIENT)
Dept: GENERAL RADIOLOGY | Facility: CLINIC | Age: 75
End: 2025-05-06
Attending: FAMILY MEDICINE
Payer: COMMERCIAL

## 2025-05-06 DIAGNOSIS — J69.0 ASPIRATION PNEUMONIA OF LEFT LOWER LOBE, UNSPECIFIED ASPIRATION PNEUMONIA TYPE (H): ICD-10-CM

## 2025-05-06 PROCEDURE — B4036 ENTERAL FEED SUP KIT GRAV BY: HCPCS

## 2025-05-06 PROCEDURE — 71046 X-RAY EXAM CHEST 2 VIEWS: CPT | Mod: TC | Performed by: RADIOLOGY

## 2025-05-07 ENCOUNTER — RESULTS FOLLOW-UP (OUTPATIENT)
Dept: FAMILY MEDICINE | Facility: CLINIC | Age: 75
End: 2025-05-07

## 2025-05-07 PROCEDURE — B4036 ENTERAL FEED SUP KIT GRAV BY: HCPCS

## 2025-05-08 PROCEDURE — B4036 ENTERAL FEED SUP KIT GRAV BY: HCPCS

## 2025-05-09 PROCEDURE — B4036 ENTERAL FEED SUP KIT GRAV BY: HCPCS

## 2025-05-10 PROCEDURE — B4036 ENTERAL FEED SUP KIT GRAV BY: HCPCS

## 2025-05-11 PROCEDURE — B4036 ENTERAL FEED SUP KIT GRAV BY: HCPCS

## 2025-05-12 PROCEDURE — B4036 ENTERAL FEED SUP KIT GRAV BY: HCPCS

## 2025-05-13 PROCEDURE — B4036 ENTERAL FEED SUP KIT GRAV BY: HCPCS

## 2025-05-14 PROCEDURE — B4036 ENTERAL FEED SUP KIT GRAV BY: HCPCS

## 2025-05-15 PROCEDURE — B4036 ENTERAL FEED SUP KIT GRAV BY: HCPCS

## 2025-05-16 PROCEDURE — B4036 ENTERAL FEED SUP KIT GRAV BY: HCPCS

## 2025-05-17 PROCEDURE — B4036 ENTERAL FEED SUP KIT GRAV BY: HCPCS

## 2025-05-18 PROCEDURE — B4036 ENTERAL FEED SUP KIT GRAV BY: HCPCS

## 2025-05-19 PROCEDURE — B4036 ENTERAL FEED SUP KIT GRAV BY: HCPCS

## 2025-05-20 ENCOUNTER — HOME INFUSION BILLING (OUTPATIENT)
Dept: HOME HEALTH SERVICES | Facility: HOME HEALTH | Age: 75
End: 2025-05-20
Payer: COMMERCIAL

## 2025-05-20 ENCOUNTER — HOME INFUSION (OUTPATIENT)
Dept: HOME HEALTH SERVICES | Facility: HOME HEALTH | Age: 75
End: 2025-05-20
Payer: COMMERCIAL

## 2025-05-20 PROCEDURE — B4152 EF CALORIE DENSE>/=1.5KCAL: HCPCS

## 2025-05-20 PROCEDURE — B4036 ENTERAL FEED SUP KIT GRAV BY: HCPCS

## 2025-05-21 PROCEDURE — B4036 ENTERAL FEED SUP KIT GRAV BY: HCPCS

## 2025-05-22 PROCEDURE — B4036 ENTERAL FEED SUP KIT GRAV BY: HCPCS

## 2025-05-23 PROCEDURE — B4036 ENTERAL FEED SUP KIT GRAV BY: HCPCS

## 2025-05-24 PROCEDURE — B4036 ENTERAL FEED SUP KIT GRAV BY: HCPCS

## 2025-05-25 PROCEDURE — B4036 ENTERAL FEED SUP KIT GRAV BY: HCPCS

## 2025-05-26 PROCEDURE — B4036 ENTERAL FEED SUP KIT GRAV BY: HCPCS

## 2025-05-27 PROCEDURE — B4036 ENTERAL FEED SUP KIT GRAV BY: HCPCS

## 2025-05-27 PROCEDURE — A6402 STERILE GAUZE <= 16 SQ IN: HCPCS

## 2025-05-28 PROCEDURE — B4036 ENTERAL FEED SUP KIT GRAV BY: HCPCS

## 2025-05-29 PROCEDURE — B4036 ENTERAL FEED SUP KIT GRAV BY: HCPCS

## 2025-05-30 PROCEDURE — B4036 ENTERAL FEED SUP KIT GRAV BY: HCPCS

## 2025-05-31 PROCEDURE — B4036 ENTERAL FEED SUP KIT GRAV BY: HCPCS

## 2025-06-01 PROCEDURE — B4036 ENTERAL FEED SUP KIT GRAV BY: HCPCS

## 2025-06-02 ENCOUNTER — PATIENT OUTREACH (OUTPATIENT)
Dept: CARE COORDINATION | Facility: CLINIC | Age: 75
End: 2025-06-02
Payer: COMMERCIAL

## 2025-06-02 PROCEDURE — B4036 ENTERAL FEED SUP KIT GRAV BY: HCPCS

## 2025-06-03 PROCEDURE — B4036 ENTERAL FEED SUP KIT GRAV BY: HCPCS

## 2025-06-04 ENCOUNTER — PATIENT OUTREACH (OUTPATIENT)
Dept: CARE COORDINATION | Facility: CLINIC | Age: 75
End: 2025-06-04
Payer: COMMERCIAL

## 2025-06-04 PROCEDURE — B4036 ENTERAL FEED SUP KIT GRAV BY: HCPCS

## 2025-06-05 DIAGNOSIS — E83.110 HEREDITARY HEMOCHROMATOSIS: Primary | ICD-10-CM

## 2025-06-05 DIAGNOSIS — Z85.71 HISTORY OF HODGKIN'S LYMPHOMA: ICD-10-CM

## 2025-06-05 PROCEDURE — B4036 ENTERAL FEED SUP KIT GRAV BY: HCPCS

## 2025-06-05 NOTE — PROGRESS NOTES
Clinic Note Katharine 10,2025      Haresh returns to the [facility] for history of a JAK2 positive atypical myeloproliferative syndrome, status post nonmyeloablative allo-sib peripheral blood stem cell transplant in 2007, complicated by chronic puvuo-zrixou-kora disease, cirrhosis, hemochromatosis with C282Y homozygosity, pulmonary emboli and DVT as well as paroxysmal atrial fibrillation for which he is on warfarin, Hodgkin disease in 2011 s/p ABVD x 5 cycles, and a history of cardiac arrhythmias with an ablation. He was  diagnosed by Dr. Ángel Hill with Parkinson disease, with weight loss, dysphagia with aspiration, requiring G tube placement, orthostasis, urinary obstruction. Most recent CT and PET 2/26/21 were without evidence for recurrence of malignancy. Hypermetabolic activity at the anorectal junction with diffuse wall thickening was worked up by 3/17/21 flex sigmoidoscopy which showed a 4 mm polyp (removed, tubular adenoma), and erythematous mucosa that was biopsied and found superficial vascular congestion and melanosis coli without evidence for GVHD. Had full colonoscopy 4/14/21 without other findings. He had TURP with Dr. Hawley for urinary obstruction 8/9/21, as medical therapy was causing too much orthostasis.     INTERVAL HISTORY  Haresh had aspiration pneumonia April 9 admitted to [place] treated with Unasyn.He recovered relatively quickly with home antibiotics. He has had 4 falls  due to Parkinsons.He injured right maxilla but no fracture.were done No significant head injury but worried about concussion Carbidopa 3x per day for Parkinonson Continues on formula feedings. He continues to drink about 1 cup of Mountain Dew soda each day to help with feelings of dryness and cough.     He continues to experience polyuria and nocturia ( 4-6 x/night).   He has not had a phlebotomy for a while for his iron overload. No fever. Weight is increased~ 170 He has cough when fluids stick in throat        PAST MEDICAL HISTORY:  See my note from 12/2024     SOCIAL HISTORY:  See my note from 12/2024     FAMILY HISTORY:  See my note from  [id number]     REVIEW OF SYSTEMS:    Constitutional: c/o worsening fatigue, has occasional chills as mentioned above. No weight loss or fever  Eyes: no new vision problems  Ears: no new hearing issues  CVS: no chest pain or palpitations  Resp: has SOB at baseline, no new symptoms   GI: no abdominal pain, has occasional loose stools  MSK: Has chronic right hip and knee pain  Skin: no rashes  Neuro: no focal weakness or headaches    PHYSICAL EXAMINATION:   GENERAL:  Patient is alert in no acute distress.  VITAL SIGNS: /68 (BP Location: Right arm, Patient Position: Sitting, Cuff Size: Adult Regular)   Pulse 61   Temp 97.8  F (36.6  C) (Oral)   Resp 18   Wt 77.6 kg (171 lb)   SpO2 97%   BMI 24.19 kg/m       HEENT:  Normocephalic.  EOM okay, no scleral icterus.  Mouth without lesion.   RESPIRATORY:  Clear to percussion and auscultation.   CARDIAC:  Normal sinus rhythm.  No S3, S4 or murmur.   ABDOMEN:  Feeding tube in place    EXTREMITIES: no edema.   NEUROLOGIC:  minimal tremor of hands no cogwheeling of arms writing poor  MS No arthritis  SKIN No lesions No bruising  PSYCH Mood appropriate             ASSESSSMENT  1.  Myeloproliferative syndrome/MDS. STEVIE 2 positive  2.  Status post non-myeloablative allo-sib peripheral blood stem cell transplant 2007  3.  History of chronic yhhox-pcuwcn-hmow disease.   4.  History of Hodgkin's disease 2011 s/p ABVD x 5 cycles.   5.  History of cardiac arrhythmias, SVT and V tach.   6.  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   7.  History of cirrhosis.   8.  History of pulmonary emboli.   9.  History of elevated hemoglobin A1c.   10. Thyroid nodule.    11. Cholelithiasis.     12. Diverticulosis.   13. Anticoagulation with warfarin for history of DVT, PE, and paroxysmal atrial fibrillation  14. Status  post bilateral knee replacement.     15. Aortic stenosis  16. 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  17. Seborrheic keratosis of the back.  18. Weight loss, dysphagia, anorexia related to Parkinson's  19. Parkinson's  20. BPH with obstruction, s/p TURP 21  21. Chronic aspiration related to dysphagia  22. Thrombocytopenia  23. GERD  24. Chills  25. Worsening urinary frequency  26. Aspiration pneumonia       ASSESSMENT:    Haresh's platelet count  is stable at 82k. s. He has maintained his weight  He needs to be careful about swallowing liquids with recent aspiration  Sorry to hear that his brother who was the donor of bone marrow  2 months of a blood cancer.  Continue warfarin, and will adjust with Coumadin clinic I worry about his falls related Parkinsons.He uses a walker in his home.    MGUS in past  with but no monoclonal peak 2024   .We will see Haresh again in 6 months with labs.       I spent 30 minutes on the date of the encounter doing chart review, history  and exam, documentation and further coordination as noted above The longitudinal plan of care for the diagnosis(es)/condition(s) as documented were addressed during this visit. Due to the added complexity in care, I will continue to support Haresh in the subsequent management and with ongoing continuity of care.     Augusto Miller MD    Latest Reference Range & Units 06/10/25 09:47   Sodium 135 - 145 mmol/L 137   Potassium 3.4 - 5.3 mmol/L 4.6   Chloride 98 - 107 mmol/L 101   Carbon Dioxide (CO2) 22 - 29 mmol/L 26   Urea Nitrogen 8.0 - 23.0 mg/dL 28.9 (H)   Creatinine 0.67 - 1.17 mg/dL 0.93   GFR Estimate >60 mL/min/1.73m2 85   Calcium 8.8 - 10.4 mg/dL 9.0   Anion Gap 7 - 15 mmol/L 10   Albumin 3.5 - 5.2 g/dL 3.8   Protein Total 6.4 - 8.3 g/dL 7.4   Alkaline Phosphatase 40 - 150 U/L 130   ALT 0 - 70 U/L 5   AST 0 - 45 U/L 52 (H)   Bilirubin Total <=1.2 mg/dL 0.9   CRP Inflammation <5.00 mg/L 4.92   Glucose  70 - 99 mg/dL 108 (H)   Iron 61 - 157 ug/dL 137   Iron Binding Capacity 240 - 430 ug/dL 217 (L)   Iron Sat Index 15 - 46 % 63 (H)   Lactate Dehydrogenase 0 - 250 U/L 229   WBC 4.0 - 11.0 10e3/uL 8.1   Hemoglobin 13.3 - 17.7 g/dL 15.2   Hematocrit 40.0 - 53.0 % 42.6   Platelet Count 150 - 450 10e3/uL 82 (L)   RBC Count 4.40 - 5.90 10e6/uL 4.35 (L)   MCV 78 - 100 fL 98   MCH 26.5 - 33.0 pg 34.9 (H)   MCHC 31.5 - 36.5 g/dL 35.7   RDW 10.0 - 15.0 % 12.1   % Neutrophils % 60   % Lymphocytes % 30   % Monocytes % 9   % Eosinophils % 1   % Basophils % 0   % Immature Granulocytes % 0   NRBC/W <1 /100 0   Absolute Neutrophil 1.6 - 8.3 10e3/uL 4.8   Absolute Lymphocytes 0.8 - 5.3 10e3/uL 2.4   Absolute Monocytes 0.0 - 1.3 10e3/uL 0.7   Absolute Eosinophils 0.0 - 0.7 10e3/uL 0.1   Absolute Basophils 0.0 - 0.2 10e3/uL 0.0   Absolute Immature Granulocytes <=0.4 10e3/uL 0.0   Absolute NRBCs 10e3/uL 0.0   % Retic 0.5 - 2.0 % 1.8   Absolute Retic 0.025 - 0.095 10e6/uL 0.080   (H): Data is abnormally high  (L): Data is abnormally low

## 2025-06-06 PROCEDURE — B4036 ENTERAL FEED SUP KIT GRAV BY: HCPCS

## 2025-06-07 PROCEDURE — B4036 ENTERAL FEED SUP KIT GRAV BY: HCPCS

## 2025-06-08 PROCEDURE — B4036 ENTERAL FEED SUP KIT GRAV BY: HCPCS

## 2025-06-09 PROCEDURE — B4036 ENTERAL FEED SUP KIT GRAV BY: HCPCS

## 2025-06-10 ENCOUNTER — PATIENT OUTREACH (OUTPATIENT)
Dept: ONCOLOGY | Facility: CLINIC | Age: 75
End: 2025-06-10

## 2025-06-10 ENCOUNTER — ONCOLOGY VISIT (OUTPATIENT)
Dept: TRANSPLANT | Facility: CLINIC | Age: 75
End: 2025-06-10
Attending: INTERNAL MEDICINE
Payer: COMMERCIAL

## 2025-06-10 VITALS
SYSTOLIC BLOOD PRESSURE: 108 MMHG | DIASTOLIC BLOOD PRESSURE: 68 MMHG | OXYGEN SATURATION: 97 % | BODY MASS INDEX: 24.19 KG/M2 | TEMPERATURE: 97.8 F | RESPIRATION RATE: 18 BRPM | HEART RATE: 61 BPM | WEIGHT: 171 LBS

## 2025-06-10 DIAGNOSIS — Z94.81 BONE MARROW TRANSPLANT STATUS (H): ICD-10-CM

## 2025-06-10 DIAGNOSIS — E83.110 HEREDITARY HEMOCHROMATOSIS: ICD-10-CM

## 2025-06-10 DIAGNOSIS — Z85.71 HISTORY OF HODGKIN'S LYMPHOMA: ICD-10-CM

## 2025-06-10 DIAGNOSIS — E04.1 THYROID NODULE: ICD-10-CM

## 2025-06-10 DIAGNOSIS — G20.B2 PARKINSON'S DISEASE WITH DYSKINESIA AND FLUCTUATING MANIFESTATIONS (H): ICD-10-CM

## 2025-06-10 DIAGNOSIS — Z79.01 LONG TERM CURRENT USE OF ANTICOAGULANT THERAPY: ICD-10-CM

## 2025-06-10 DIAGNOSIS — K80.70 CALCULUS OF GALLBLADDER AND BILE DUCT WITHOUT CHOLECYSTITIS OR OBSTRUCTION: ICD-10-CM

## 2025-06-10 DIAGNOSIS — D89.811 GRAFT-VERSUS-HOST DISEASE, CHRONIC (H): ICD-10-CM

## 2025-06-10 DIAGNOSIS — K74.69 OTHER CIRRHOSIS OF LIVER (H): ICD-10-CM

## 2025-06-10 LAB
ALBUMIN SERPL BCG-MCNC: 3.8 G/DL (ref 3.5–5.2)
ALP SERPL-CCNC: 130 U/L (ref 40–150)
ALT SERPL W P-5'-P-CCNC: 5 U/L (ref 0–70)
ANION GAP SERPL CALCULATED.3IONS-SCNC: 10 MMOL/L (ref 7–15)
AST SERPL W P-5'-P-CCNC: 52 U/L (ref 0–45)
BASOPHILS # BLD AUTO: 0 10E3/UL (ref 0–0.2)
BASOPHILS NFR BLD AUTO: 0 %
BILIRUB SERPL-MCNC: 0.9 MG/DL
BUN SERPL-MCNC: 28.9 MG/DL (ref 8–23)
CALCIUM SERPL-MCNC: 9 MG/DL (ref 8.8–10.4)
CHLORIDE SERPL-SCNC: 101 MMOL/L (ref 98–107)
CREAT SERPL-MCNC: 0.93 MG/DL (ref 0.67–1.17)
CRP SERPL-MCNC: 4.92 MG/L
EGFRCR SERPLBLD CKD-EPI 2021: 85 ML/MIN/1.73M2
EOSINOPHIL # BLD AUTO: 0.1 10E3/UL (ref 0–0.7)
EOSINOPHIL NFR BLD AUTO: 1 %
ERYTHROCYTE [DISTWIDTH] IN BLOOD BY AUTOMATED COUNT: 12.1 % (ref 10–15)
FERRITIN SERPL-MCNC: 196 NG/ML (ref 31–409)
GLUCOSE SERPL-MCNC: 108 MG/DL (ref 70–99)
HCO3 SERPL-SCNC: 26 MMOL/L (ref 22–29)
HCT VFR BLD AUTO: 42.6 % (ref 40–53)
HGB BLD-MCNC: 15.2 G/DL (ref 13.3–17.7)
IGA SERPL-MCNC: 600 MG/DL (ref 84–499)
IGG SERPL-MCNC: 1409 MG/DL (ref 610–1616)
IGM SERPL-MCNC: 223 MG/DL (ref 35–242)
IMM GRANULOCYTES # BLD: 0 10E3/UL
IMM GRANULOCYTES NFR BLD: 0 %
IRON BINDING CAPACITY (ROCHE): 217 UG/DL (ref 240–430)
IRON SATN MFR SERPL: 63 % (ref 15–46)
IRON SERPL-MCNC: 137 UG/DL (ref 61–157)
KAPPA LC FREE SER-MCNC: 7.02 MG/DL (ref 0.33–1.94)
KAPPA LC FREE/LAMBDA FREE SER NEPH: 2.02 {RATIO} (ref 0.26–1.65)
LAMBDA LC FREE SERPL-MCNC: 3.48 MG/DL (ref 0.57–2.63)
LDH SERPL L TO P-CCNC: 229 U/L (ref 0–250)
LYMPHOCYTES # BLD AUTO: 2.4 10E3/UL (ref 0.8–5.3)
LYMPHOCYTES NFR BLD AUTO: 30 %
MCH RBC QN AUTO: 34.9 PG (ref 26.5–33)
MCHC RBC AUTO-ENTMCNC: 35.7 G/DL (ref 31.5–36.5)
MCV RBC AUTO: 98 FL (ref 78–100)
MONOCYTES # BLD AUTO: 0.7 10E3/UL (ref 0–1.3)
MONOCYTES NFR BLD AUTO: 9 %
NEUTROPHILS # BLD AUTO: 4.8 10E3/UL (ref 1.6–8.3)
NEUTROPHILS NFR BLD AUTO: 60 %
NRBC # BLD AUTO: 0 10E3/UL
NRBC BLD AUTO-RTO: 0 /100
PLATELET # BLD AUTO: 82 10E3/UL (ref 150–450)
POTASSIUM SERPL-SCNC: 4.6 MMOL/L (ref 3.4–5.3)
PROT SERPL-MCNC: 7.4 G/DL (ref 6.4–8.3)
RBC # BLD AUTO: 4.35 10E6/UL (ref 4.4–5.9)
RETICS # AUTO: 0.08 10E6/UL (ref 0.03–0.1)
RETICS/RBC NFR AUTO: 1.8 % (ref 0.5–2)
SODIUM SERPL-SCNC: 137 MMOL/L (ref 135–145)
TOTAL PROTEIN SERUM FOR ELP: 7.2 G/DL (ref 6.4–8.3)
WBC # BLD AUTO: 8.1 10E3/UL (ref 4–11)

## 2025-06-10 PROCEDURE — 82784 ASSAY IGA/IGD/IGG/IGM EACH: CPT | Performed by: INTERNAL MEDICINE

## 2025-06-10 PROCEDURE — 84155 ASSAY OF PROTEIN SERUM: CPT | Performed by: INTERNAL MEDICINE

## 2025-06-10 PROCEDURE — 36415 COLL VENOUS BLD VENIPUNCTURE: CPT | Performed by: INTERNAL MEDICINE

## 2025-06-10 PROCEDURE — 84165 PROTEIN E-PHORESIS SERUM: CPT | Mod: 26 | Performed by: STUDENT IN AN ORGANIZED HEALTH CARE EDUCATION/TRAINING PROGRAM

## 2025-06-10 PROCEDURE — B4036 ENTERAL FEED SUP KIT GRAV BY: HCPCS

## 2025-06-10 PROCEDURE — 83521 IG LIGHT CHAINS FREE EACH: CPT | Performed by: INTERNAL MEDICINE

## 2025-06-10 PROCEDURE — 86140 C-REACTIVE PROTEIN: CPT | Performed by: INTERNAL MEDICINE

## 2025-06-10 PROCEDURE — 83615 LACTATE (LD) (LDH) ENZYME: CPT | Performed by: INTERNAL MEDICINE

## 2025-06-10 PROCEDURE — 85025 COMPLETE CBC W/AUTO DIFF WBC: CPT | Performed by: INTERNAL MEDICINE

## 2025-06-10 PROCEDURE — 83540 ASSAY OF IRON: CPT | Performed by: INTERNAL MEDICINE

## 2025-06-10 PROCEDURE — 84165 PROTEIN E-PHORESIS SERUM: CPT | Mod: TC | Performed by: STUDENT IN AN ORGANIZED HEALTH CARE EDUCATION/TRAINING PROGRAM

## 2025-06-10 PROCEDURE — 99214 OFFICE O/P EST MOD 30 MIN: CPT | Performed by: INTERNAL MEDICINE

## 2025-06-10 PROCEDURE — 85045 AUTOMATED RETICULOCYTE COUNT: CPT | Performed by: INTERNAL MEDICINE

## 2025-06-10 PROCEDURE — 82728 ASSAY OF FERRITIN: CPT | Performed by: INTERNAL MEDICINE

## 2025-06-10 PROCEDURE — G0463 HOSPITAL OUTPT CLINIC VISIT: HCPCS | Performed by: INTERNAL MEDICINE

## 2025-06-10 PROCEDURE — 84450 TRANSFERASE (AST) (SGOT): CPT | Performed by: INTERNAL MEDICINE

## 2025-06-10 PROCEDURE — G2211 COMPLEX E/M VISIT ADD ON: HCPCS | Performed by: INTERNAL MEDICINE

## 2025-06-10 ASSESSMENT — PAIN SCALES - GENERAL: PAINLEVEL_OUTOF10: NO PAIN (0)

## 2025-06-10 NOTE — NURSING NOTE
Chief Complaint   Patient presents with    Blood Draw     Labs drawn via  by RN in lab, vitals taken.      Labs collected from venipuncture by RN. Vitals taken. Checked in for appointment(s).    Niharika Wallace RN

## 2025-06-10 NOTE — LETTER
6/10/2025      Haresh Rock  [address] [place] [address]      Dear Colleague,    Thank you for referring your patient, Haresh Rock, to the [facility] [place]. Please see a copy of my visit note below.        Clinic Note Katharine 10,2025      Haresh returns to the [facility] for history of a JAK2 positive atypical myeloproliferative syndrome, status post nonmyeloablative allo-sib peripheral blood stem cell transplant in 2007, complicated by chronic idqbn-wbpvzu-sleo disease, cirrhosis, hemochromatosis with C282Y homozygosity, pulmonary emboli and DVT as well as paroxysmal atrial fibrillation for which he is on warfarin, Hodgkin disease in 2011 s/p ABVD x 5 cycles, and a history of cardiac arrhythmias with an ablation. He was  diagnosed by Dr. Ángel Hill with Parkinson disease, with weight loss, dysphagia with aspiration, requiring G tube placement, orthostasis, urinary obstruction. Most recent CT and PET 2/26/21 were without evidence for recurrence of malignancy. Hypermetabolic activity at the anorectal junction with diffuse wall thickening was worked up by 3/17/21 flex sigmoidoscopy which showed a 4 mm polyp (removed, tubular adenoma), and erythematous mucosa that was biopsied and found superficial vascular congestion and melanosis coli without evidence for GVHD. Had full colonoscopy 4/14/21 without other findings. He had TURP with Dr. Hawley for urinary obstruction 8/9/21, as medical therapy was causing too much orthostasis.     INTERVAL HISTORY  Haresh had aspiration pneumonia April 9 admitted to [place] treated with Unasyn.He recovered relatively quickly with home antibiotics. He has had 4 falls  due to Parkinsons.He injured right maxilla but no fracture.were done No significant head injury but worried about concussion Carbidopa 3x per day for Parkinonson Continues on formula feedings. He continues to drink about 1 cup of Mountain Dew soda each  day to help with feelings of dryness and cough.     He continues to experience polyuria and nocturia ( 4-6 x/night).   He has not had a phlebotomy for a while for his iron overload. No fever. Weight is increased~ 170 He has cough when fluids stick in throat       PAST MEDICAL HISTORY:  See my note from 12/2024     SOCIAL HISTORY:  See my note from 12/2024     FAMILY HISTORY:  See my note from  [id number]     REVIEW OF SYSTEMS:    Constitutional: c/o worsening fatigue, has occasional chills as mentioned above. No weight loss or fever  Eyes: no new vision problems  Ears: no new hearing issues  CVS: no chest pain or palpitations  Resp: has SOB at baseline, no new symptoms   GI: no abdominal pain, has occasional loose stools  MSK: Has chronic right hip and knee pain  Skin: no rashes  Neuro: no focal weakness or headaches    PHYSICAL EXAMINATION:   GENERAL:  Patient is alert in no acute distress.  VITAL SIGNS: /68 (BP Location: Right arm, Patient Position: Sitting, Cuff Size: Adult Regular)   Pulse 61   Temp 97.8  F (36.6  C) (Oral)   Resp 18   Wt 77.6 kg (171 lb)   SpO2 97%   BMI 24.19 kg/m       HEENT:  Normocephalic.  EOM okay, no scleral icterus.  Mouth without lesion.   RESPIRATORY:  Clear to percussion and auscultation.   CARDIAC:  Normal sinus rhythm.  No S3, S4 or murmur.   ABDOMEN:  Feeding tube in place    EXTREMITIES: no edema.   NEUROLOGIC:  minimal tremor of hands no cogwheeling of arms writing poor  MS No arthritis  SKIN No lesions No bruising  PSYCH Mood appropriate             ASSESSSMENT  1.  Myeloproliferative syndrome/MDS. STEVIE 2 positive  2.  Status post non-myeloablative allo-sib peripheral blood stem cell transplant 2007  3.  History of chronic ikaxl-ojtzcp-vsou disease.   4.  History of Hodgkin's disease 2011 s/p ABVD x 5 cycles.   5.  History of cardiac arrhythmias, SVT and V tach.   6.  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   7.  History of  cirrhosis.   8.  History of pulmonary emboli.   9.  History of elevated hemoglobin A1c.   10. Thyroid nodule.    11. Cholelithiasis.     12. Diverticulosis.   13. Anticoagulation with warfarin for history of DVT, PE, and paroxysmal atrial fibrillation  14. Status post bilateral knee replacement.     15. Aortic stenosis  16. 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  17. Seborrheic keratosis of the back.  18. Weight loss, dysphagia, anorexia related to Parkinson's  19. Parkinson's  20. BPH with obstruction, s/p TURP 21  21. Chronic aspiration related to dysphagia  22. Thrombocytopenia  23. GERD  24. Chills  25. Worsening urinary frequency  26. Aspiration pneumonia       ASSESSMENT:    Haresh's platelet count  is stable at 82k. s. He has maintained his weight  He needs to be careful about swallowing liquids with recent aspiration  Sorry to hear that his brother who was the donor of bone marrow  2 months of a blood cancer.  Continue warfarin, and will adjust with Coumadin clinic I worry about his falls related Parkinsons.He uses a walker in his home.    MGUS in past  with but no monoclonal peak 2024   .We will see Haresh again in 6 months with labs.       I spent 30 minutes on the date of the encounter doing chart review, history  and exam, documentation and further coordination as noted above The longitudinal plan of care for the diagnosis(es)/condition(s) as documented were addressed during this visit. Due to the added complexity in care, I will continue to support Haresh in the subsequent management and with ongoing continuity of care.     Augusto Miller MD    Latest Reference Range & Units 06/10/25 09:47   Sodium 135 - 145 mmol/L 137   Potassium 3.4 - 5.3 mmol/L 4.6   Chloride 98 - 107 mmol/L 101   Carbon Dioxide (CO2) 22 - 29 mmol/L 26   Urea Nitrogen 8.0 - 23.0 mg/dL 28.9 (H)   Creatinine 0.67 - 1.17 mg/dL 0.93   GFR Estimate >60 mL/min/1.73m2 85   Calcium 8.8 - 10.4  mg/dL 9.0   Anion Gap 7 - 15 mmol/L 10   Albumin 3.5 - 5.2 g/dL 3.8   Protein Total 6.4 - 8.3 g/dL 7.4   Alkaline Phosphatase 40 - 150 U/L 130   ALT 0 - 70 U/L 5   AST 0 - 45 U/L 52 (H)   Bilirubin Total <=1.2 mg/dL 0.9   CRP Inflammation <5.00 mg/L 4.92   Glucose 70 - 99 mg/dL 108 (H)   Iron 61 - 157 ug/dL 137   Iron Binding Capacity 240 - 430 ug/dL 217 (L)   Iron Sat Index 15 - 46 % 63 (H)   Lactate Dehydrogenase 0 - 250 U/L 229   WBC 4.0 - 11.0 10e3/uL 8.1   Hemoglobin 13.3 - 17.7 g/dL 15.2   Hematocrit 40.0 - 53.0 % 42.6   Platelet Count 150 - 450 10e3/uL 82 (L)   RBC Count 4.40 - 5.90 10e6/uL 4.35 (L)   MCV 78 - 100 fL 98   MCH 26.5 - 33.0 pg 34.9 (H)   MCHC 31.5 - 36.5 g/dL 35.7   RDW 10.0 - 15.0 % 12.1   % Neutrophils % 60   % Lymphocytes % 30   % Monocytes % 9   % Eosinophils % 1   % Basophils % 0   % Immature Granulocytes % 0   NRBC/W <1 /100 0   Absolute Neutrophil 1.6 - 8.3 10e3/uL 4.8   Absolute Lymphocytes 0.8 - 5.3 10e3/uL 2.4   Absolute Monocytes 0.0 - 1.3 10e3/uL 0.7   Absolute Eosinophils 0.0 - 0.7 10e3/uL 0.1   Absolute Basophils 0.0 - 0.2 10e3/uL 0.0   Absolute Immature Granulocytes <=0.4 10e3/uL 0.0   Absolute NRBCs 10e3/uL 0.0   % Retic 0.5 - 2.0 % 1.8   Absolute Retic 0.025 - 0.095 10e6/uL 0.080   (H): Data is abnormally high  (L): Data is abnormally low    Again, thank you for allowing me to participate in the care of your patient.        Sincerely,        Augusto Miller MD    Electronically signed

## 2025-06-10 NOTE — NURSING NOTE
"Oncology Rooming Note    Katharine 10, 2025 10:09 AM   Haresh Rock is a 75 year old male who presents for:    Chief Complaint   Patient presents with    Blood Draw     Labs drawn via  by RN in lab, vitals taken.     Oncology Clinic Visit     Hereditary Hemochromatosis  Hx of Hodgkin's Lymphoma     Initial Vitals: /68 (BP Location: Right arm, Patient Position: Sitting, Cuff Size: Adult Regular)   Pulse 61   Temp 97.8  F (36.6  C) (Oral)   Resp 18   Wt 77.6 kg (171 lb)   SpO2 97%   BMI 24.19 kg/m   Estimated body mass index is 24.19 kg/m  as calculated from the following:    Height as of 4/28/25: 1.791 m (5' 10.5\").    Weight as of this encounter: 77.6 kg (171 lb). Body surface area is 1.96 meters squared.  No Pain (0) Comment: Data Unavailable   No LMP for male patient.  Allergies reviewed: Yes  Medications reviewed: Yes    Medications: Medication refills not needed today.  Pharmacy name entered into EPIC:    [facility] - [place], [place] - [address] [place] [address] PHARM[address]    Frailty Screening:   Is the patient here for a new oncology consult visit in cancer care? 2. No    PHQ9:  Did this patient require a PHQ9?: No      Clinical concerns: None       Jacquie Ibarra LPN  6/10/2025              "

## 2025-06-10 NOTE — PROGRESS NOTES
[facility]: Cancer Care                                                                                          Met with pt today in clinic, prior to his visit with Dr. Geovani Miller.  Pt by self in clinic.  Completed learning assessment with pt.    Pt also completed new consent to communicate because current consent from last year did not include his daughter, Gemma, and son, James.  RN assisted with completing form as pt stated his writing is less clear related to his Parkinson's.  Completed form sent to [facility] for entry into chart.    Signature:  Bonnie Walls RN, ON

## 2025-06-11 PROCEDURE — B4036 ENTERAL FEED SUP KIT GRAV BY: HCPCS

## 2025-06-12 DIAGNOSIS — D68.61 ANTIPHOSPHOLIPID ANTIBODY SYNDROME: ICD-10-CM

## 2025-06-12 LAB
ALBUMIN SERPL ELPH-MCNC: 3.7 G/DL (ref 3.7–5.1)
ALPHA1 GLOB SERPL ELPH-MCNC: 0.3 G/DL (ref 0.2–0.4)
ALPHA2 GLOB SERPL ELPH-MCNC: 0.6 G/DL (ref 0.5–0.9)
B-GLOBULIN SERPL ELPH-MCNC: 0.9 G/DL (ref 0.6–1)
GAMMA GLOB SERPL ELPH-MCNC: 1.7 G/DL (ref 0.7–1.6)
M PROTEIN SERPL ELPH-MCNC: 0 G/DL
PROT PATTERN SERPL ELPH-IMP: ABNORMAL

## 2025-06-12 PROCEDURE — B4036 ENTERAL FEED SUP KIT GRAV BY: HCPCS

## 2025-06-13 ENCOUNTER — RESULTS FOLLOW-UP (OUTPATIENT)
Dept: NURSING | Facility: CLINIC | Age: 75
End: 2025-06-13

## 2025-06-13 PROCEDURE — B4036 ENTERAL FEED SUP KIT GRAV BY: HCPCS

## 2025-06-14 PROCEDURE — B4036 ENTERAL FEED SUP KIT GRAV BY: HCPCS

## 2025-06-15 PROCEDURE — B4036 ENTERAL FEED SUP KIT GRAV BY: HCPCS

## 2025-06-16 PROCEDURE — B4036 ENTERAL FEED SUP KIT GRAV BY: HCPCS

## 2025-06-16 RX ORDER — WARFARIN SODIUM 2.5 MG/1
TABLET ORAL
Qty: 110 TABLET | Refills: 1 | Status: SHIPPED | OUTPATIENT
Start: 2025-06-16

## 2025-06-16 NOTE — TELEPHONE ENCOUNTER
ANTICOAGULATION MANAGEMENT:  Medication Refill    Anticoagulation Summary  As of 6/13/2025      Warfarin maintenance plan:  3.75 mg (2.5 mg x 1.5) every Sun, Thu; 2.5 mg (2.5 mg x 1) all other days   Next INR check:  6/20/2025   Target end date:  Indefinite    Indications    Long-term (current) use of anticoagulants [Z79.01] [Z79.01]  Atrial fibrillation (H) (Resolved) [I48.91]  Antiphospholipid antibody syndrome (Resolved) [D68.61]  Personal history of DVT (deep vein thrombosis) (Resolved) [Z86.718]  Atrial fibrillation  unspecified type (H) (Resolved) [I48.91]  Paroxysmal atrial fibrillation (H) [I48.0]  Chronic atrial fibrillation (H) (Resolved) [I48.20]  Personal history of PE (pulmonary embolism) [Z86.711]                 Anticoagulation Care Providers       Provider Role Specialty Phone number    Anya Nowak MD Referring Family Medicine [phone]    Elizabeth Balderas MD Referring HOSPITALIST [phone]            Refill Criteria    Visit with referring provider/group: Meets criteria: visit within referring provider group in the last 15 months on 4/14/25    [facility] referral last signed: 04/14/2025; within last year:  Yes    Lab monitoring is up to date (not exceeding 2 weeks overdue): Yes    Haresh meets all criteria for refill. Rx instructions and quantity supplied updated to match patient's current dosing plan. Warfarin 90 day supply with 1 refill granted per [facility] protocol     Zonia Baer RN  Anticoagulation Clinic

## 2025-06-17 PROCEDURE — B4036 ENTERAL FEED SUP KIT GRAV BY: HCPCS

## 2025-06-18 PROCEDURE — B4036 ENTERAL FEED SUP KIT GRAV BY: HCPCS

## 2025-06-19 ENCOUNTER — HOME INFUSION BILLING (OUTPATIENT)
Dept: HOME HEALTH SERVICES | Facility: HOME HEALTH | Age: 75
End: 2025-06-19
Payer: COMMERCIAL

## 2025-06-19 PROCEDURE — B4036 ENTERAL FEED SUP KIT GRAV BY: HCPCS

## 2025-06-20 ENCOUNTER — RESULTS FOLLOW-UP (OUTPATIENT)
Dept: ANTICOAGULATION | Facility: CLINIC | Age: 75
End: 2025-06-20

## 2025-06-20 PROCEDURE — B4036 ENTERAL FEED SUP KIT GRAV BY: HCPCS

## 2025-06-21 PROCEDURE — B4036 ENTERAL FEED SUP KIT GRAV BY: HCPCS

## 2025-06-22 PROCEDURE — B4036 ENTERAL FEED SUP KIT GRAV BY: HCPCS

## 2025-06-23 PROCEDURE — B4036 ENTERAL FEED SUP KIT GRAV BY: HCPCS

## 2025-06-23 NOTE — PROGRESS NOTES
Patient arrives for trial of void.  250 sterile water instilled into bladder through existing roberson catheter. Balloon deflated and catheter removed without problem. Patient unable to urinate provider informed.   Michelle Reyes RN      Scheduled date of colonoscopy (as of today):  7/24/25  Physician performing colonoscopy: elias  Location of colonoscopy: [facility]  Bowel prep reviewed with patient: KIRK/YOAV  Instructions reviewed with patient by: blayne  Clearances: na    Screening  MyChart

## 2025-06-24 ENCOUNTER — TELEPHONE (OUTPATIENT)
Dept: NEUROLOGY | Facility: CLINIC | Age: 75
End: 2025-06-24
Payer: COMMERCIAL

## 2025-06-24 NOTE — TELEPHONE ENCOUNTER
I returned call to [facility] and she stated that she needs His last office note Physically signed. Dr. Hill is not back in clinic until 07/14/25 [facility] is informed. Office note placed in Dr. Hill folder waiting for signature and will be faxed back to [facility] at [phone].

## 2025-06-24 NOTE — TELEPHONE ENCOUNTER
[facility] Call Center    Phone Message    May a detailed message be left on voicemail: yes     Reason for Call: Form or Letter   Type or form/letter needing completion: request for ly chair recliner   Provider: Ángel Hill MD   Date form needed: 06/24/2025  Once completed: Fax form to: [phone]    Amy with [facility] medical  says the forms which included 31 pages were difficult to understand and read. Amy can be reached at      Action Taken: Message routed to:  Other: [facility]    Travel Screening: Not Applicable

## 2025-06-30 ENCOUNTER — RESULTS FOLLOW-UP (OUTPATIENT)
Dept: ANTICOAGULATION | Facility: CLINIC | Age: 75
End: 2025-06-30
Payer: COMMERCIAL

## 2025-06-30 ENCOUNTER — ANTICOAGULATION THERAPY VISIT (OUTPATIENT)
Dept: ANTICOAGULATION | Facility: CLINIC | Age: 75
End: 2025-06-30
Payer: COMMERCIAL

## 2025-06-30 DIAGNOSIS — I48.0 PAROXYSMAL ATRIAL FIBRILLATION (H): ICD-10-CM

## 2025-06-30 DIAGNOSIS — Z79.01 LONG TERM CURRENT USE OF ANTICOAGULANT THERAPY: Primary | ICD-10-CM

## 2025-06-30 DIAGNOSIS — Z86.711 PERSONAL HISTORY OF PE (PULMONARY EMBOLISM): ICD-10-CM

## 2025-06-30 LAB — INR HOME MONITORING: 3.1 RATIO (ref 2–3)

## 2025-06-30 NOTE — PROGRESS NOTES
ANTICOAGULATION MANAGEMENT     Haresh Rock 75 year old male is on warfarin with supratherapeutic INR result. (Goal INR 2.0-3.0)    Recent labs: (last 7 days)     06/30/25  1150   INR 3.1*       ASSESSMENT     Source(s): Chart Review and Patient/Caregiver Call     Warfarin doses taken: Warfarin taken as instructed  Diet: No new diet changes identified  Medication/supplement changes: None noted  New illness, injury, or hospitalization: No  Signs or symptoms of bleeding or clotting: No  Previous result: Therapeutic last 2(+) visits  Additional findings: Patient prefers to keep his dosing the same. Advised recheck 7/11 however patient requesting to check sooner       PLAN     Recommended plan for no diet, medication or health factor changes affecting INR     Dosing Instructions: Continue your current warfarin dose with next INR in 1 week       Summary  As of 6/30/2025      Full warfarin instructions:  3.75 mg every Sun, Thu; 2.5 mg all other days   Next INR check:  7/7/2025               Telephone call with Haresh who verbalizes understanding and agrees to plan    Patient to recheck with home meter    Education provided: Contact [phone] with any changes, questions or concerns.     Plan made per [facility] anticoagulation protocol    Janice Head RN  6/30/2025  Anticoagulation Clinic  [facility] for routing messages: p ANTICOAG HOME MONITORING  [facility] patient phone line: [phone]        _______________________________________________________________________     Anticoagulation Episode Summary       Current INR goal:  2.0-3.0   TTR:  78.2% (1 y)   Target end date:  Indefinite   Send INR reminders to:  ANTICOAG HOME MONITORING    Indications    Long-term (current) use of anticoagulants [Z79.01] [Z79.01]  Atrial fibrillation (H) (Resolved) [I48.91]  Antiphospholipid antibody syndrome (Resolved) [D68.61]  Personal history of DVT (deep vein thrombosis) (Resolved) [Z86.718]  Atrial fibrillation  unspecified type (H)  (Resolved) [I48.91]  Paroxysmal atrial fibrillation (H) [I48.0]  Chronic atrial fibrillation (H) (Resolved) [I48.20]  Personal history of PE (pulmonary embolism) [Z86.711]             Comments:  JESSICA villafuerteay to manage by exception             Anticoagulation Care Providers       Provider Role Specialty Phone number    Anya Nowak MD Referring Family Medicine [phone]    Elizabeth Balderas MD Referring HOSPITALIST [phone]

## 2025-07-07 ENCOUNTER — ANTICOAGULATION THERAPY VISIT (OUTPATIENT)
Dept: ANTICOAGULATION | Facility: CLINIC | Age: 75
End: 2025-07-07
Payer: COMMERCIAL

## 2025-07-07 DIAGNOSIS — Z86.711 PERSONAL HISTORY OF PE (PULMONARY EMBOLISM): ICD-10-CM

## 2025-07-07 DIAGNOSIS — Z79.01 LONG TERM CURRENT USE OF ANTICOAGULANT THERAPY: Primary | ICD-10-CM

## 2025-07-07 DIAGNOSIS — I48.0 PAROXYSMAL ATRIAL FIBRILLATION (H): ICD-10-CM

## 2025-07-07 NOTE — PROGRESS NOTES
ANTICOAGULATION MANAGEMENT     Haresh Rock 75 year old male is on warfarin with therapeutic INR result. (Goal INR 2.0-3.0)    Recent labs: (last 7 days)     07/04/25  1202   INR 2.8       ASSESSMENT     Source(s): Chart Review and Patient/Caregiver Call     Warfarin doses taken: Warfarin taken as instructed  Diet: No new diet changes identified  Medication/supplement changes: None noted  New illness, injury, or hospitalization: No  Signs or symptoms of bleeding or clotting: No  Previous result: Supratherapeutic  Additional findings: None       PLAN     Recommended plan for no diet, medication or health factor changes affecting INR     Dosing Instructions: Continue your current warfarin dose with next INR in 1 week       Summary  As of 7/7/2025      Full warfarin instructions:  3.75 mg every Sun, Thu; 2.5 mg all other days   Next INR check:  7/11/2025               Sent [facility] message with dosing and follow up instructions    Patient to recheck with home meter    Education provided: Please call back if any changes to your diet, medications or how you've been taking warfarin    Plan made per [facility] anticoagulation protocol    Allyn Doe RN  7/7/2025  Anticoagulation Clinic  [facility] for routing messages: p ANTICOAG HOME MONITORING  [facility] patient phone line: [phone]        _______________________________________________________________________     Anticoagulation Episode Summary       Current INR goal:  2.0-3.0   TTR:  78.7% (1 y)   Target end date:  Indefinite   Send INR reminders to:  ANTICOAG HOME MONITORING    Indications    Long-term (current) use of anticoagulants [Z79.01] [Z79.01]  Atrial fibrillation (H) (Resolved) [I48.91]  Antiphospholipid antibody syndrome (Resolved) [D68.61]  Personal history of DVT (deep vein thrombosis) (Resolved) [Z86.718]  Atrial fibrillation  unspecified type (H) (Resolved) [I48.91]  Paroxysmal atrial fibrillation (H) [I48.0]  Chronic atrial fibrillation (H) (Resolved)  [I48.20]  Personal history of PE (pulmonary embolism) [Z86.711]             Comments:  JESSICA okay to manage by exception             Anticoagulation Care Providers       Provider Role Specialty Phone number    Anya Nowak MD Referring Family Medicine [phone]    Elizabeth Balderas MD Referring HOSPITALIST [phone]

## 2025-07-13 DIAGNOSIS — K92.0 HEMATEMESIS WITH NAUSEA: ICD-10-CM

## 2025-07-14 RX ORDER — OMEPRAZOLE 20 MG/1
20 CAPSULE, DELAYED RELEASE ORAL EVERY MORNING
Qty: 90 CAPSULE | Refills: 3 | Status: SHIPPED | OUTPATIENT
Start: 2025-07-14

## 2025-07-16 ENCOUNTER — HOME INFUSION (OUTPATIENT)
Dept: HOME HEALTH SERVICES | Facility: HOME HEALTH | Age: 75
End: 2025-07-16
Payer: COMMERCIAL

## 2025-07-19 ENCOUNTER — MYC REFILL (OUTPATIENT)
Dept: NEUROLOGY | Facility: CLINIC | Age: 75
End: 2025-07-19
Payer: COMMERCIAL

## 2025-07-19 DIAGNOSIS — G20.A1 PARKINSON'S DISEASE, UNSPECIFIED WHETHER DYSKINESIA PRESENT, UNSPECIFIED WHETHER MANIFESTATIONS FLUCTUATE (H): ICD-10-CM

## 2025-07-21 ENCOUNTER — HOME INFUSION BILLING (OUTPATIENT)
Dept: HOME HEALTH SERVICES | Facility: HOME HEALTH | Age: 75
End: 2025-07-21
Payer: COMMERCIAL

## 2025-07-21 PROCEDURE — B4152 EF CALORIE DENSE>/=1.5KCAL: HCPCS

## 2025-07-21 RX ORDER — CARBIDOPA AND LEVODOPA 25; 100 MG/1; MG/1
TABLET ORAL
Qty: 450 TABLET | Refills: 3 | Status: SHIPPED | OUTPATIENT
Start: 2025-07-21

## 2025-07-21 NOTE — TELEPHONE ENCOUNTER
Pending Prescriptions:                       Disp   Refills    carbidopa-levodopa (SINEMET)  MG ta*450 ta*3            Si.5 crushed at 7/830a, 2 tabs crushed @ 2pm, 1           crushed @ 7p  = 4.5-5/day        Requested Pharmacy: [facility]    Pt's last office visit: 25  Next scheduled office visit: 10/27/25      Per the RN/LPN medication refill protocol, writer is unable to refill this request.

## 2025-07-22 ENCOUNTER — TELEPHONE (OUTPATIENT)
Dept: NEUROLOGY | Facility: CLINIC | Age: 75
End: 2025-07-22
Payer: COMMERCIAL

## 2025-07-22 NOTE — TELEPHONE ENCOUNTER
Received a clarification request from [facility] regarding carbidopa levodopa prescription. They are requesting clarification on timing if it is at 7am and 830am or 730am to 8am. Second, they would like to know if it is 4.5 or 5 tabs.

## 2025-07-22 NOTE — TELEPHONE ENCOUNTER
Called and clarified with [facility] per Dr. Hill: The timing was variable due to the variable timing he was taking the dose  The amount is more to 5 than 4.5 as the pills crumble when cut. No further concerns at this time.

## 2025-07-24 PROCEDURE — B4036 ENTERAL FEED SUP KIT GRAV BY: HCPCS

## 2025-07-25 PROCEDURE — B4036 ENTERAL FEED SUP KIT GRAV BY: HCPCS

## 2025-07-26 PROCEDURE — B4036 ENTERAL FEED SUP KIT GRAV BY: HCPCS

## 2025-07-27 PROCEDURE — B4036 ENTERAL FEED SUP KIT GRAV BY: HCPCS

## 2025-07-28 PROCEDURE — B4036 ENTERAL FEED SUP KIT GRAV BY: HCPCS

## 2025-07-29 PROCEDURE — B4036 ENTERAL FEED SUP KIT GRAV BY: HCPCS

## 2025-07-30 PROCEDURE — B4036 ENTERAL FEED SUP KIT GRAV BY: HCPCS

## 2025-07-31 PROCEDURE — B4036 ENTERAL FEED SUP KIT GRAV BY: HCPCS

## 2025-08-01 PROCEDURE — B4036 ENTERAL FEED SUP KIT GRAV BY: HCPCS

## 2025-08-02 PROCEDURE — B4036 ENTERAL FEED SUP KIT GRAV BY: HCPCS

## 2025-08-03 PROCEDURE — B4036 ENTERAL FEED SUP KIT GRAV BY: HCPCS

## 2025-08-04 PROCEDURE — B4036 ENTERAL FEED SUP KIT GRAV BY: HCPCS

## 2025-08-05 PROCEDURE — B4036 ENTERAL FEED SUP KIT GRAV BY: HCPCS

## 2025-08-06 PROCEDURE — B4036 ENTERAL FEED SUP KIT GRAV BY: HCPCS

## 2025-08-07 PROCEDURE — B4036 ENTERAL FEED SUP KIT GRAV BY: HCPCS

## 2025-08-08 PROCEDURE — B4036 ENTERAL FEED SUP KIT GRAV BY: HCPCS

## 2025-08-09 PROCEDURE — B4036 ENTERAL FEED SUP KIT GRAV BY: HCPCS

## 2025-08-10 PROCEDURE — B4036 ENTERAL FEED SUP KIT GRAV BY: HCPCS

## 2025-08-11 ENCOUNTER — OFFICE VISIT (OUTPATIENT)
Dept: SURGERY | Facility: CLINIC | Age: 75
End: 2025-08-11
Payer: COMMERCIAL

## 2025-08-11 VITALS
BODY MASS INDEX: 23.76 KG/M2 | DIASTOLIC BLOOD PRESSURE: 60 MMHG | HEART RATE: 69 BPM | HEIGHT: 71 IN | SYSTOLIC BLOOD PRESSURE: 112 MMHG | OXYGEN SATURATION: 97 % | WEIGHT: 169.7 LBS | TEMPERATURE: 97.9 F

## 2025-08-11 DIAGNOSIS — Z43.1 ATTENTION TO G-TUBE (H): Primary | ICD-10-CM

## 2025-08-11 PROCEDURE — 3078F DIAST BP <80 MM HG: CPT | Performed by: SURGERY

## 2025-08-11 PROCEDURE — 1126F AMNT PAIN NOTED NONE PRSNT: CPT | Performed by: SURGERY

## 2025-08-11 PROCEDURE — 43762 RPLC GTUBE NO REVJ TRC: CPT | Performed by: SURGERY

## 2025-08-11 PROCEDURE — 3074F SYST BP LT 130 MM HG: CPT | Performed by: SURGERY

## 2025-08-11 PROCEDURE — B4036 ENTERAL FEED SUP KIT GRAV BY: HCPCS

## 2025-08-11 ASSESSMENT — PAIN SCALES - GENERAL: PAINLEVEL_OUTOF10: NO PAIN (0)

## 2025-08-12 PROCEDURE — B4036 ENTERAL FEED SUP KIT GRAV BY: HCPCS

## 2025-08-13 PROCEDURE — B4036 ENTERAL FEED SUP KIT GRAV BY: HCPCS

## 2025-08-14 PROCEDURE — B4036 ENTERAL FEED SUP KIT GRAV BY: HCPCS

## 2025-08-15 PROCEDURE — B4036 ENTERAL FEED SUP KIT GRAV BY: HCPCS

## 2025-08-16 PROCEDURE — B4036 ENTERAL FEED SUP KIT GRAV BY: HCPCS

## 2025-08-17 PROCEDURE — B4036 ENTERAL FEED SUP KIT GRAV BY: HCPCS

## 2025-08-18 ENCOUNTER — HOME INFUSION (OUTPATIENT)
Dept: HOME HEALTH SERVICES | Facility: HOME HEALTH | Age: 75
End: 2025-08-18
Payer: COMMERCIAL

## 2025-08-18 ENCOUNTER — HOME INFUSION BILLING (OUTPATIENT)
Dept: HOME HEALTH SERVICES | Facility: HOME HEALTH | Age: 75
End: 2025-08-18
Payer: COMMERCIAL

## 2025-08-18 PROCEDURE — B4036 ENTERAL FEED SUP KIT GRAV BY: HCPCS

## 2025-08-18 PROCEDURE — B4152 EF CALORIE DENSE>/=1.5KCAL: HCPCS

## 2025-08-19 PROCEDURE — B4036 ENTERAL FEED SUP KIT GRAV BY: HCPCS

## 2025-08-20 ENCOUNTER — VIRTUAL VISIT (OUTPATIENT)
Dept: PHARMACY | Facility: CLINIC | Age: 75
End: 2025-08-20
Attending: PSYCHIATRY & NEUROLOGY
Payer: COMMERCIAL

## 2025-08-20 DIAGNOSIS — K59.00 CONSTIPATION, UNSPECIFIED CONSTIPATION TYPE: ICD-10-CM

## 2025-08-20 DIAGNOSIS — R09.89 PHLEGM IN THROAT: ICD-10-CM

## 2025-08-20 DIAGNOSIS — K11.7 SIALORRHEA: ICD-10-CM

## 2025-08-20 DIAGNOSIS — R13.12 OROPHARYNGEAL DYSPHAGIA: ICD-10-CM

## 2025-08-20 DIAGNOSIS — G20.A1 PARKINSON'S DISEASE WITHOUT FLUCTUATING MANIFESTATIONS, UNSPECIFIED WHETHER DYSKINESIA PRESENT (H): Primary | ICD-10-CM

## 2025-08-20 PROCEDURE — B4036 ENTERAL FEED SUP KIT GRAV BY: HCPCS

## 2025-08-20 RX ORDER — POLYETHYLENE GLYCOL 3350 17 G/17G
1 POWDER, FOR SOLUTION ORAL DAILY PRN
COMMUNITY

## 2025-08-21 PROCEDURE — B4036 ENTERAL FEED SUP KIT GRAV BY: HCPCS

## 2025-08-22 PROCEDURE — B4036 ENTERAL FEED SUP KIT GRAV BY: HCPCS

## 2025-08-23 PROCEDURE — B4036 ENTERAL FEED SUP KIT GRAV BY: HCPCS

## 2025-08-24 PROCEDURE — B4036 ENTERAL FEED SUP KIT GRAV BY: HCPCS

## 2025-08-25 PROCEDURE — B4036 ENTERAL FEED SUP KIT GRAV BY: HCPCS

## 2025-08-26 PROCEDURE — B4036 ENTERAL FEED SUP KIT GRAV BY: HCPCS

## 2025-08-27 PROCEDURE — B4036 ENTERAL FEED SUP KIT GRAV BY: HCPCS

## 2025-08-28 PROCEDURE — B4036 ENTERAL FEED SUP KIT GRAV BY: HCPCS

## 2025-08-29 PROCEDURE — B4036 ENTERAL FEED SUP KIT GRAV BY: HCPCS

## 2025-08-30 PROCEDURE — B4036 ENTERAL FEED SUP KIT GRAV BY: HCPCS

## 2025-08-31 PROCEDURE — B4036 ENTERAL FEED SUP KIT GRAV BY: HCPCS

## 2025-09-01 PROCEDURE — B4036 ENTERAL FEED SUP KIT GRAV BY: HCPCS

## 2025-09-02 PROCEDURE — B4036 ENTERAL FEED SUP KIT GRAV BY: HCPCS

## 2025-09-03 PROCEDURE — B4036 ENTERAL FEED SUP KIT GRAV BY: HCPCS

## 2025-09-04 PROCEDURE — B4036 ENTERAL FEED SUP KIT GRAV BY: HCPCS

## 2025-09-05 PROCEDURE — B4036 ENTERAL FEED SUP KIT GRAV BY: HCPCS

## 2025-09-06 PROCEDURE — B4036 ENTERAL FEED SUP KIT GRAV BY: HCPCS

## 2025-09-07 PROCEDURE — B4036 ENTERAL FEED SUP KIT GRAV BY: HCPCS

## 2025-09-08 PROCEDURE — B4036 ENTERAL FEED SUP KIT GRAV BY: HCPCS

## 2025-09-09 PROCEDURE — B4036 ENTERAL FEED SUP KIT GRAV BY: HCPCS

## 2025-09-10 PROCEDURE — B4036 ENTERAL FEED SUP KIT GRAV BY: HCPCS

## 2025-09-11 PROCEDURE — B4036 ENTERAL FEED SUP KIT GRAV BY: HCPCS

## 2025-09-12 PROCEDURE — B4036 ENTERAL FEED SUP KIT GRAV BY: HCPCS

## 2025-09-13 PROCEDURE — B4036 ENTERAL FEED SUP KIT GRAV BY: HCPCS

## 2025-09-14 PROCEDURE — B4036 ENTERAL FEED SUP KIT GRAV BY: HCPCS

## 2025-09-15 PROCEDURE — B4036 ENTERAL FEED SUP KIT GRAV BY: HCPCS

## 2025-09-16 PROCEDURE — B4036 ENTERAL FEED SUP KIT GRAV BY: HCPCS

## 2025-09-17 PROCEDURE — B4036 ENTERAL FEED SUP KIT GRAV BY: HCPCS

## 2025-09-18 PROCEDURE — B4036 ENTERAL FEED SUP KIT GRAV BY: HCPCS

## 2025-09-19 PROCEDURE — B4036 ENTERAL FEED SUP KIT GRAV BY: HCPCS

## 2025-09-20 PROCEDURE — B4036 ENTERAL FEED SUP KIT GRAV BY: HCPCS

## 2025-09-21 PROCEDURE — B4036 ENTERAL FEED SUP KIT GRAV BY: HCPCS

## (undated) DEVICE — LINEN TOWEL PACK X5 5464

## (undated) DEVICE — SU SILK 3-0 SH CR 8X18" C013D

## (undated) DEVICE — DECANTER VIAL 2006S

## (undated) DEVICE — TUBING SUCTION 10'X3/16" N510

## (undated) DEVICE — ESU LIGASURE MARYLAND LAPAROSCOPIC SLR/DVDR 5MMX37CM LF1937

## (undated) DEVICE — ENDO TROCAR SLEEVE KII ADV FIXATION 05X100MM CFS02

## (undated) DEVICE — ESU PENCIL W/COATED BLADE E2450H

## (undated) DEVICE — SURGICEL HEMOSTAT 4X8" 1952

## (undated) DEVICE — SU PDS II 1 CTX 36" Z371T

## (undated) DEVICE — GOWN IMPERVIOUS 2XL BLUE

## (undated) DEVICE — SUCTION MANIFOLD NEPTUNE 2 SYS 4 PORT 0702-020-000

## (undated) DEVICE — COVER CAMERA IN-LIGHT DISP LT-C02

## (undated) DEVICE — CATH BALLOON MERIT ESOPH FIVE-STAGE 17X21MMX180CM EX18

## (undated) DEVICE — SUCTION CATH AIRLIFE TRI-FLO W/CONTROL PORT 14FR  T60C

## (undated) DEVICE — ENDO TROCAR FIRST ENTRY KII FIOS ADV FIX 05X100MM CFF03

## (undated) DEVICE — SOL NACL 0.9% INJ 1000ML BAG 2B1324X

## (undated) DEVICE — ESU GROUND PAD ADULT W/CORD E7507

## (undated) DEVICE — LINEN TOWEL PACK X6 WHITE 5487

## (undated) DEVICE — SU VICRYL 4-0 PS-2 18" UND J496H

## (undated) DEVICE — ENDO FORCEP BX CAPTURA PRO SPIKE G50696

## (undated) DEVICE — LIGHT HANDLE X1 31140133

## (undated) DEVICE — GLOVE EXAM NITRILE LG PF LATEX FREE 5064

## (undated) DEVICE — Device

## (undated) DEVICE — PREP CHLORAPREP 26ML TINTED ORANGE  260815

## (undated) DEVICE — ENDO BITE BLOCK ADULT OMNI-BLOC

## (undated) DEVICE — GLOVE PROTEXIS MICRO 7.0  2D73PM70

## (undated) DEVICE — SYR 30ML SLIP TIP W/O NDL 302833

## (undated) DEVICE — SUCTION MANIFOLD NEPTUNE 2 SYS 1 PORT 702-025-000

## (undated) DEVICE — WIPES FOLEY CARE SURESTEP PROVON DFC100

## (undated) DEVICE — GLOVE PROTEXIS BLUE W/NEU-THERA 7.5  2D73EB75

## (undated) DEVICE — TUBING SUCTION 12"X1/4" N612

## (undated) DEVICE — ANTIFOG SOLUTION W/FOAM PAD 31142527

## (undated) DEVICE — SUCTION TIP YANKAUER W/O VENT K86

## (undated) DEVICE — STPL LINEAR CUT 100MM TLC10

## (undated) DEVICE — SOL WATER IRRIG 1000ML BOTTLE 2F7114

## (undated) DEVICE — STPL RELOAD LINEAR CUT 100X3.8MM TCR10

## (undated) DEVICE — SUCTION TIP POOLE K770

## (undated) DEVICE — ENDO POUCH UNIV RETRIEVAL SYSTEM INZII 10MM CD001

## (undated) DEVICE — KIT ENDO TURNOVER/PROCEDURE CARRY-ON 101822

## (undated) RX ORDER — ROCURONIUM BROMIDE 50 MG/5 ML
SYRINGE (ML) INTRAVENOUS
Status: DISPENSED
Start: 2021-10-29

## (undated) RX ORDER — LIDOCAINE HYDROCHLORIDE 20 MG/ML
JELLY TOPICAL
Status: DISPENSED
Start: 2021-10-25

## (undated) RX ORDER — FENTANYL CITRATE 50 UG/ML
INJECTION, SOLUTION INTRAMUSCULAR; INTRAVENOUS
Status: DISPENSED
Start: 2021-10-29

## (undated) RX ORDER — ESMOLOL HYDROCHLORIDE 10 MG/ML
INJECTION INTRAVENOUS
Status: DISPENSED
Start: 2021-10-30

## (undated) RX ORDER — ONDANSETRON 2 MG/ML
INJECTION INTRAMUSCULAR; INTRAVENOUS
Status: DISPENSED
Start: 2018-01-04

## (undated) RX ORDER — FENTANYL CITRATE 50 UG/ML
INJECTION, SOLUTION INTRAMUSCULAR; INTRAVENOUS
Status: DISPENSED
Start: 2021-03-17

## (undated) RX ORDER — SODIUM CHLORIDE 9 MG/ML
INJECTION, SOLUTION INTRAVENOUS
Status: DISPENSED
Start: 2021-10-25

## (undated) RX ORDER — REGADENOSON 0.08 MG/ML
INJECTION, SOLUTION INTRAVENOUS
Status: DISPENSED
Start: 2021-10-13

## (undated) RX ORDER — DEXAMETHASONE SODIUM PHOSPHATE 4 MG/ML
INJECTION, SOLUTION INTRA-ARTICULAR; INTRALESIONAL; INTRAMUSCULAR; INTRAVENOUS; SOFT TISSUE
Status: DISPENSED
Start: 2021-10-29

## (undated) RX ORDER — SODIUM CHLORIDE, SODIUM LACTATE, POTASSIUM CHLORIDE, CALCIUM CHLORIDE 600; 310; 30; 20 MG/100ML; MG/100ML; MG/100ML; MG/100ML
INJECTION, SOLUTION INTRAVENOUS
Status: DISPENSED
Start: 2021-10-29

## (undated) RX ORDER — PROTAMINE SULFATE 10 MG/ML
INJECTION, SOLUTION INTRAVENOUS
Status: DISPENSED
Start: 2018-01-04

## (undated) RX ORDER — FENTANYL CITRATE 50 UG/ML
INJECTION, SOLUTION INTRAMUSCULAR; INTRAVENOUS
Status: DISPENSED
Start: 2021-10-25

## (undated) RX ORDER — PROPOFOL 10 MG/ML
INJECTION, EMULSION INTRAVENOUS
Status: DISPENSED
Start: 2021-10-29

## (undated) RX ORDER — ELECTROLYTES/DEXTROSE
SOLUTION, ORAL ORAL
Status: DISPENSED

## (undated) RX ORDER — FENTANYL CITRATE 50 UG/ML
INJECTION, SOLUTION INTRAMUSCULAR; INTRAVENOUS
Status: DISPENSED
Start: 2021-10-30

## (undated) RX ORDER — EPHEDRINE SULFATE 50 MG/ML
INJECTION, SOLUTION INTRAMUSCULAR; INTRAVENOUS; SUBCUTANEOUS
Status: DISPENSED
Start: 2021-10-29

## (undated) RX ORDER — FENTANYL CITRATE 50 UG/ML
INJECTION, SOLUTION INTRAMUSCULAR; INTRAVENOUS
Status: DISPENSED
Start: 2018-01-04

## (undated) RX ORDER — HEPARIN SODIUM 1000 [USP'U]/ML
INJECTION, SOLUTION INTRAVENOUS; SUBCUTANEOUS
Status: DISPENSED
Start: 2018-01-04

## (undated) RX ORDER — FENTANYL CITRATE 50 UG/ML
INJECTION, SOLUTION INTRAMUSCULAR; INTRAVENOUS
Status: DISPENSED
Start: 2021-04-14

## (undated) RX ORDER — PROPOFOL 10 MG/ML
INJECTION, EMULSION INTRAVENOUS
Status: DISPENSED
Start: 2018-01-04

## (undated) RX ORDER — AMINOPHYLLINE 25 MG/ML
INJECTION, SOLUTION INTRAVENOUS
Status: DISPENSED
Start: 2021-10-13

## (undated) RX ORDER — FENTANYL CITRATE 50 UG/ML
INJECTION, SOLUTION INTRAMUSCULAR; INTRAVENOUS
Status: DISPENSED
Start: 2018-01-03

## (undated) RX ORDER — BUPIVACAINE HYDROCHLORIDE AND EPINEPHRINE 5; 5 MG/ML; UG/ML
INJECTION, SOLUTION PERINEURAL
Status: DISPENSED
Start: 2021-10-29

## (undated) RX ORDER — CEFAZOLIN SODIUM 1 G/3ML
INJECTION, POWDER, FOR SOLUTION INTRAMUSCULAR; INTRAVENOUS
Status: DISPENSED
Start: 2018-01-04

## (undated) RX ORDER — EPHEDRINE SULFATE 50 MG/ML
INJECTION, SOLUTION INTRAMUSCULAR; INTRAVENOUS; SUBCUTANEOUS
Status: DISPENSED
Start: 2018-01-04

## (undated) RX ORDER — LIDOCAINE HYDROCHLORIDE 20 MG/ML
INJECTION, SOLUTION EPIDURAL; INFILTRATION; INTRACAUDAL; PERINEURAL
Status: DISPENSED
Start: 2021-10-29

## (undated) RX ORDER — FUROSEMIDE 10 MG/ML
INJECTION INTRAMUSCULAR; INTRAVENOUS
Status: DISPENSED
Start: 2018-01-04

## (undated) RX ORDER — ONDANSETRON 2 MG/ML
INJECTION INTRAMUSCULAR; INTRAVENOUS
Status: DISPENSED
Start: 2021-10-29

## (undated) RX ORDER — FENTANYL CITRATE-0.9 % NACL/PF 10 MCG/ML
PLASTIC BAG, INJECTION (ML) INTRAVENOUS
Status: DISPENSED
Start: 2021-10-29